# Patient Record
Sex: FEMALE | Race: BLACK OR AFRICAN AMERICAN | NOT HISPANIC OR LATINO | Employment: OTHER | URBAN - METROPOLITAN AREA
[De-identification: names, ages, dates, MRNs, and addresses within clinical notes are randomized per-mention and may not be internally consistent; named-entity substitution may affect disease eponyms.]

---

## 2020-11-27 ENCOUNTER — HOSPITAL ENCOUNTER (EMERGENCY)
Facility: HOSPITAL | Age: 60
Discharge: HOME | End: 2020-11-27
Attending: EMERGENCY MEDICINE
Payer: COMMERCIAL

## 2020-11-27 ENCOUNTER — APPOINTMENT (EMERGENCY)
Dept: RADIOLOGY | Facility: HOSPITAL | Age: 60
End: 2020-11-27
Attending: EMERGENCY MEDICINE
Payer: COMMERCIAL

## 2020-11-27 VITALS
BODY MASS INDEX: 22.5 KG/M2 | SYSTOLIC BLOOD PRESSURE: 143 MMHG | HEART RATE: 89 BPM | TEMPERATURE: 98.1 F | OXYGEN SATURATION: 98 % | RESPIRATION RATE: 18 BRPM | HEIGHT: 66 IN | DIASTOLIC BLOOD PRESSURE: 65 MMHG | WEIGHT: 140 LBS

## 2020-11-27 DIAGNOSIS — A04.72 CLOSTRIDIUM DIFFICILE COLITIS: ICD-10-CM

## 2020-11-27 DIAGNOSIS — R19.7 DIARRHEA, UNSPECIFIED TYPE: Primary | ICD-10-CM

## 2020-11-27 DIAGNOSIS — R10.9 ABDOMINAL PAIN, UNSPECIFIED ABDOMINAL LOCATION: ICD-10-CM

## 2020-11-27 LAB
ALBUMIN SERPL-MCNC: 3.3 G/DL (ref 3.4–5)
ALP SERPL-CCNC: 85 IU/L (ref 35–126)
ALT SERPL-CCNC: 8 IU/L (ref 11–54)
ANION GAP SERPL CALC-SCNC: 10 MEQ/L (ref 3–15)
AST SERPL-CCNC: 15 IU/L (ref 15–41)
BACTERIA #/AREA URNS HPF: ABNORMAL /HPF
BASOPHILS # BLD: 0.07 K/UL (ref 0.01–0.1)
BASOPHILS NFR BLD: 0.7 %
BILIRUB SERPL-MCNC: 0.5 MG/DL (ref 0.3–1.2)
BILIRUB UR QL STRIP.AUTO: NEGATIVE MG/DL
BUN SERPL-MCNC: 10 MG/DL (ref 8–20)
C DIFF STL QL: POSITIVE
CALCIUM SERPL-MCNC: 9.6 MG/DL (ref 8.9–10.3)
CHLORIDE SERPL-SCNC: 106 MEQ/L (ref 98–109)
CLARITY UR REFRACT.AUTO: CLEAR
CO2 SERPL-SCNC: 24 MEQ/L (ref 22–32)
COLOR UR AUTO: YELLOW
CREAT SERPL-MCNC: 1.1 MG/DL (ref 0.6–1.1)
DIFFERENTIAL METHOD BLD: ABNORMAL
EOSINOPHIL # BLD: 0.12 K/UL (ref 0.04–0.36)
EOSINOPHIL NFR BLD: 1.2 %
ERYTHROCYTE [DISTWIDTH] IN BLOOD BY AUTOMATED COUNT: 15 % (ref 11.7–14.4)
GFR SERPL CREATININE-BSD FRML MDRD: >60 ML/MIN/1.73M*2
GLUCOSE SERPL-MCNC: 85 MG/DL (ref 70–99)
GLUCOSE UR STRIP.AUTO-MCNC: NEGATIVE MG/DL
HCT VFR BLDCO AUTO: 39 % (ref 35–45)
HGB BLD-MCNC: 12.1 G/DL (ref 11.8–15.7)
HGB UR QL STRIP.AUTO: NEGATIVE
HYALINE CASTS #/AREA URNS LPF: ABNORMAL /LPF
IMM GRANULOCYTES # BLD AUTO: 0.03 K/UL (ref 0–0.08)
IMM GRANULOCYTES NFR BLD AUTO: 0.3 %
KETONES UR STRIP.AUTO-MCNC: 1 MG/DL
LACTATE SERPL-SCNC: 1.3 MMOL/L (ref 0.4–2)
LEUKOCYTE ESTERASE UR QL STRIP.AUTO: 1
LIPASE SERPL-CCNC: 28 U/L (ref 20–51)
LYMPHOCYTES # BLD: 2.02 K/UL (ref 1.2–3.5)
LYMPHOCYTES NFR BLD: 20.4 %
MCH RBC QN AUTO: 27.9 PG (ref 28–33.2)
MCHC RBC AUTO-ENTMCNC: 31 G/DL (ref 32.2–35.5)
MCV RBC AUTO: 90.1 FL (ref 83–98)
MONOCYTES # BLD: 0.65 K/UL (ref 0.28–0.8)
MONOCYTES NFR BLD: 6.6 %
NEUTROPHILS # BLD: 7.02 K/UL (ref 1.7–7)
NEUTS SEG NFR BLD: 70.8 %
NITRITE UR QL STRIP.AUTO: NEGATIVE
NRBC BLD-RTO: 0 %
PDW BLD AUTO: 11.8 FL (ref 9.4–12.3)
PH UR STRIP.AUTO: 6 [PH]
PLATELET # BLD AUTO: 282 K/UL (ref 150–369)
POTASSIUM SERPL-SCNC: 3.5 MEQ/L (ref 3.6–5.1)
PROT SERPL-MCNC: 8.3 G/DL (ref 6–8.2)
PROT UR QL STRIP.AUTO: 2
RBC # BLD AUTO: 4.33 M/UL (ref 3.93–5.22)
RBC #/AREA URNS HPF: ABNORMAL /HPF
SODIUM SERPL-SCNC: 140 MEQ/L (ref 136–144)
SP GR UR REFRACT.AUTO: 1.01
SQUAMOUS #/AREA URNS HPF: 1 /HPF
UROBILINOGEN UR STRIP-ACNC: 0.2 EU/DL
WBC # BLD AUTO: 9.91 K/UL (ref 3.8–10.5)
WBC #/AREA URNS HPF: ABNORMAL /HPF

## 2020-11-27 PROCEDURE — 96360 HYDRATION IV INFUSION INIT: CPT

## 2020-11-27 PROCEDURE — 63700000 HC SELF-ADMINISTRABLE DRUG

## 2020-11-27 PROCEDURE — 3E0337Z INTRODUCTION OF ELECTROLYTIC AND WATER BALANCE SUBSTANCE INTO PERIPHERAL VEIN, PERCUTANEOUS APPROACH: ICD-10-PCS | Performed by: EMERGENCY MEDICINE

## 2020-11-27 PROCEDURE — 87324 CLOSTRIDIUM AG IA: CPT | Performed by: PHYSICIAN ASSISTANT

## 2020-11-27 PROCEDURE — 25800000 HC PHARMACY IV SOLUTIONS: Performed by: PHYSICIAN ASSISTANT

## 2020-11-27 PROCEDURE — 83605 ASSAY OF LACTIC ACID: CPT | Performed by: PHYSICIAN ASSISTANT

## 2020-11-27 PROCEDURE — 81003 URINALYSIS AUTO W/O SCOPE: CPT | Performed by: EMERGENCY MEDICINE

## 2020-11-27 PROCEDURE — 87046 STOOL CULTR AEROBIC BACT EA: CPT | Performed by: PHYSICIAN ASSISTANT

## 2020-11-27 PROCEDURE — 85025 COMPLETE CBC W/AUTO DIFF WBC: CPT | Performed by: EMERGENCY MEDICINE

## 2020-11-27 PROCEDURE — 36415 COLL VENOUS BLD VENIPUNCTURE: CPT | Performed by: EMERGENCY MEDICINE

## 2020-11-27 PROCEDURE — G1004 CDSM NDSC: HCPCS

## 2020-11-27 PROCEDURE — 87086 URINE CULTURE/COLONY COUNT: CPT | Performed by: EMERGENCY MEDICINE

## 2020-11-27 PROCEDURE — 80053 COMPREHEN METABOLIC PANEL: CPT | Performed by: EMERGENCY MEDICINE

## 2020-11-27 PROCEDURE — 99284 EMERGENCY DEPT VISIT MOD MDM: CPT | Mod: 25

## 2020-11-27 PROCEDURE — 83690 ASSAY OF LIPASE: CPT | Performed by: PHYSICIAN ASSISTANT

## 2020-11-27 RX ORDER — ACETAMINOPHEN 325 MG/1
TABLET ORAL
Status: COMPLETED
Start: 2020-11-27 | End: 2020-11-27

## 2020-11-27 RX ORDER — CHOLECALCIFEROL (VITAMIN D3) 25 MCG
1000 TABLET ORAL DAILY
COMMUNITY
End: 2024-11-30 | Stop reason: HOSPADM

## 2020-11-27 RX ORDER — PANTOPRAZOLE SODIUM 40 MG/1
40 TABLET, DELAYED RELEASE ORAL DAILY
Status: ON HOLD | COMMUNITY
Start: 2020-07-06 | End: 2023-04-14

## 2020-11-27 RX ORDER — AMLODIPINE BESYLATE 5 MG/1
10 TABLET ORAL DAILY
Status: ON HOLD | COMMUNITY
Start: 2020-09-22 | End: 2023-04-14 | Stop reason: SDUPTHER

## 2020-11-27 RX ORDER — ACETAMINOPHEN 325 MG/1
650 TABLET ORAL ONCE
Status: COMPLETED | OUTPATIENT
Start: 2020-11-27 | End: 2020-11-27

## 2020-11-27 RX ORDER — BIOTIN 1 MG
1000 TABLET ORAL DAILY
COMMUNITY
End: 2024-11-30 | Stop reason: HOSPADM

## 2020-11-27 RX ORDER — CHOLECALCIFEROL (VITAMIN D3) 25 MCG
1000 TABLET,CHEWABLE ORAL DAILY
COMMUNITY
Start: 2020-02-14 | End: 2024-11-30 | Stop reason: HOSPADM

## 2020-11-27 RX ORDER — DOCUSATE SODIUM 100 MG/1
100 CAPSULE, LIQUID FILLED ORAL DAILY PRN
COMMUNITY
End: 2024-11-30 | Stop reason: HOSPADM

## 2020-11-27 RX ORDER — MORPHINE SULFATE 2 MG/ML
4 INJECTION, SOLUTION INTRAMUSCULAR; INTRAVENOUS ONCE
Status: DISCONTINUED | OUTPATIENT
Start: 2020-11-27 | End: 2020-11-27 | Stop reason: HOSPADM

## 2020-11-27 RX ORDER — METRONIDAZOLE 500 MG/1
500 TABLET ORAL 2 TIMES DAILY
Qty: 14 TABLET | Refills: 0 | Status: SHIPPED | OUTPATIENT
Start: 2020-11-27 | End: 2020-12-04

## 2020-11-27 RX ORDER — LEVOTHYROXINE SODIUM 100 UG/1
100 TABLET ORAL DAILY
COMMUNITY
Start: 2020-07-06

## 2020-11-27 RX ADMIN — ACETAMINOPHEN 650 MG: 325 TABLET ORAL at 15:10

## 2020-11-27 RX ADMIN — SODIUM CHLORIDE 1000 ML: 900 INJECTION, SOLUTION INTRAVENOUS at 12:30

## 2020-11-27 SDOH — HEALTH STABILITY: MENTAL HEALTH: HOW OFTEN DO YOU HAVE A DRINK CONTAINING ALCOHOL?: NEVER

## 2020-11-27 ASSESSMENT — ENCOUNTER SYMPTOMS
SPEECH DIFFICULTY: 0
FEVER: 0
SEIZURES: 0
DIAPHORESIS: 0
COLOR CHANGE: 0
COUGH: 0
ACTIVITY CHANGE: 0
FACIAL SWELLING: 0
NAUSEA: 0
HEADACHES: 0
DIFFICULTY URINATING: 0
DIARRHEA: 1
SORE THROAT: 0
VOMITING: 0
ABDOMINAL PAIN: 1
NECK PAIN: 0
WHEEZING: 0
AGITATION: 0
HEMATURIA: 0
BACK PAIN: 0
WEAKNESS: 0
SHORTNESS OF BREATH: 0

## 2020-11-27 NOTE — ED PROVIDER NOTES
HPI     Chief Complaint   Patient presents with   • Diarrhea       This is a 60-year-old  woman with a history of hypertension and scleroderma and C. difficile colitis presenting to the emergency department with 4 days of gradually worsening left lower quadrant abdominal pain and copious amounts of watery diarrhea.  Symptoms were gradual in onset and have been progressively worsening.  She describes the pain is a intermittent cramp that waxes and wanes in intensity without any obvious modifying factors.  She has been comfortably tolerating oral food with a normal appetite.  She denies any fevers or chills.  She has not seen any blood in her diarrhea.  She has no other complaints or concerns today.  She denies any recent travel or recent antibiotic use is, but notes that the symptoms seem to be similar to when she was diagnosed with C. difficile in the past.           Patient History     Past Medical History:   Diagnosis Date   • Hypertension    • Scleroderma (CMS/HCC)        Past Surgical History:   Procedure Laterality Date   • HERNIA REPAIR         History reviewed. No pertinent family history.    Social History     Tobacco Use   • Smoking status: Former Smoker   • Smokeless tobacco: Never Used   Substance Use Topics   • Alcohol use: Never     Frequency: Never   • Drug use: Never       Systems Reviewed from Nursing Triage:          Review of Systems     Review of Systems   Constitutional: Negative for activity change, diaphoresis and fever.   HENT: Negative for facial swelling and sore throat.    Eyes: Negative for visual disturbance.   Respiratory: Negative for cough, shortness of breath and wheezing.    Cardiovascular: Negative for chest pain and leg swelling.   Gastrointestinal: Positive for abdominal pain and diarrhea. Negative for nausea and vomiting.   Genitourinary: Negative for difficulty urinating and hematuria.   Musculoskeletal: Negative for back pain and neck pain.   Skin: Negative for color change and  pallor.   Neurological: Negative for seizures, syncope, speech difficulty, weakness and headaches.   Psychiatric/Behavioral: Negative for agitation and behavioral problems.   All other systems reviewed and are negative.       Physical Exam     ED Vitals    Date/Time Temp Pulse Resp BP SpO2 [facility]   11/27/20 1600 -- 89 18 143/65 98 % AM   11/27/20 1500 -- 90 22 146/67 100 % AM   11/27/20 1345 -- 93 17 152/65 98 % AM   11/27/20 1245 -- 88 17 142/72 98 % AM   11/27/20 1105 36.7 °C (98.1 °F) 113 20 194/84 100 % JLR          Pulse Ox %: 98 % (11/27/20 1630)  Pulse Ox Interpretation: Normal (11/27/20 1630)  Heart Rate: 80 (11/27/20 1630)  Rhythm Strip Interpretation: Normal Sinus Rhythm (11/27/20 1630)                                       Physical Exam  Vitals signs and nursing note reviewed.   Constitutional:       Appearance: She is well-developed.   HENT:      Head: Normocephalic and atraumatic.   Eyes:      Conjunctiva/sclera: Conjunctivae normal.   Neck:      Musculoskeletal: Normal range of motion.   Cardiovascular:      Rate and Rhythm: Normal rate and regular rhythm.   Pulmonary:      Effort: Pulmonary effort is normal.      Breath sounds: Normal breath sounds.   Abdominal:      General: There is no distension.      Palpations: Abdomen is soft. There is no mass.      Tenderness: There is abdominal tenderness. There is guarding.      Hernia: No hernia is present.   Musculoskeletal: Normal range of motion.         General: No tenderness or deformity.   Skin:     General: Skin is warm and dry.   Neurological:      General: No focal deficit present.      Mental Status: She is alert. Mental status is at baseline.   Psychiatric:         Behavior: Behavior normal.              Procedures    Results     Procedure Component Value Units Date/Time    Clostridium difficile tox screen Stool [[id number]]  (Abnormal) Collected: 11/27/20 1615    Specimen: Stool Updated: 11/27/20 1838     C difficile Toxin Screen Positive    Stool  culture Stool [[id number]] Collected: 11/27/20 1615    Specimen: Stool Updated: 11/27/20 1615    Urinalysis with Reflex Culture [[id number]]  (Abnormal) Collected: 11/27/20 1444    Specimen: Urine, Clean Catch Updated: 11/27/20 1549    Narrative:      The following orders were created for panel order Urinalysis with Reflex Culture.  Procedure                               Abnormality         Status                     ---------                               -----------         ------                     UA Reflex to Culture (Ma...[[id number]]  Abnormal            Final result               UA Microscopic[[id number]]               Abnormal            Final result                 Please view results for these tests on the individual orders.    UA Microscopic [[id number]]  (Abnormal) Collected: 11/27/20 1444    Specimen: Urine, Clean Catch Updated: 11/27/20 1549     RBC, Urine 0 TO 4 /HPF      WBC, Urine 10 TO 20 /HPF      Squamous Epithelial +1 /hpf      Hyaline Casts 0 TO 2 /lpf      Bacteria, Urine None Seen /HPF     UA Reflex to Culture (Macroscopic) [[id number]]  (Abnormal) Collected: 11/27/20 1444    Specimen: Urine, Clean Catch Updated: 11/27/20 1525     Color, Urine Yellow     Clarity, Urine Clear     Specific Gravity, Urine 1.012     pH, Urine 6.0     Leukocyte Esterase +1     Nitrite, Urine Negative     Protein, Urine +2     Glucose, Urine Negative mg/dL      Ketones, Urine +1 mg/dL      Comment: Free sulfhydryl drugs such as Mesna, Capoten, and Acetylcysteine (Mucomyst) may cause false positive ketonuria.        Urobilinogen, Urine 0.2 EU/dL      Bilirubin, Urine Negative mg/dL      Blood, Urine Negative     Comment: The sensitivity of the occult blood test is equivalent to approximately 4 intact RBC/HPF.       Lactate, w/ reflex repeat if > 2.0 [[id number]]  (Normal) Collected: 11/27/20 1443    Specimen: Blood, Venous Updated: 11/27/20 1445     Lactate 1.3 mmol/L     Comprehensive metabolic panel [[id number]]   (Abnormal) Collected: 11/27/20 1344    Specimen: Blood, Venous Updated: 11/27/20 1418     Sodium 140 mEQ/L      Potassium 3.5 mEQ/L      Comment: Results obtained on plasma. Plasma Potassium values may be up to 0.4 mEQ/L less than serum values. The differences may be greater for patients with high platelet or white cell counts.        Chloride 106 mEQ/L      CO2 24 mEQ/L      BUN 10 mg/dL      Creatinine 1.1 mg/dL      Glucose 85 mg/dL      Calcium 9.6 mg/dL      AST (SGOT) 15 IU/L      ALT (SGPT) 8 IU/L      Alkaline Phosphatase 85 IU/L      Total Protein 8.3 g/dL      Comment: Test performed on plasma which typically contains approximately 0.4 g/dL more protein than serum.        Albumin 3.3 g/dL      Bilirubin, Total 0.5 mg/dL      eGFR >60.0 mL/min/1.73m*2      Anion Gap 10 mEQ/L     Lipase [[id number]]  (Normal) Collected: 11/27/20 1344    Specimen: Blood, Venous Updated: 11/27/20 1418     Lipase 28 U/L     CBC and differential [[id number]]  (Abnormal) Collected: 11/27/20 1344    Specimen: Blood, Venous Updated: 11/27/20 1356     WBC 9.91 K/uL      RBC 4.33 M/uL      Hemoglobin 12.1 g/dL      Hematocrit 39.0 %      MCV 90.1 fL      MCH 27.9 pg      MCHC 31.0 g/dL      RDW 15.0 %      Platelets 282 K/uL      MPV 11.8 fL      Differential Type Auto     nRBC 0.0 %      Immature Granulocytes 0.3 %      Neutrophils 70.8 %      Lymphocytes 20.4 %      Monocytes 6.6 %      Eosinophils 1.2 %      Basophils 0.7 %      Immature Granulocytes, Absolute 0.03 K/uL      Neutrophils, Absolute 7.02 K/uL      Lymphocytes, Absolute 2.02 K/uL      Monocytes, Absolute 0.65 K/uL      Eosinophils, Absolute 0.12 K/uL      Basophils, Absolute 0.07 K/uL           Results     Procedure Component Value Units Date/Time    CT ABDOMEN PELVIS WITHOUT IV CONTRAST [[id number]] Collected: 11/27/20 1423     Updated: 11/27/20 1440    Narrative:      CLINICAL HISTORY: Left lower quadrant pain.  Diverticulosis.  Diarrhea.  History  of C. Difficile  colitis.    COMMENT: CT of abdomen and pelvis without contrast.  Reformatted images in  sagittal and coronal images.  3 mm sections.    CT DOSE:  One or more dose reduction techniques (e.g. automated exposure  control, adjustment of the mA and/or kV according to patient size, use of  iterative reconstruction technique) utilized for this examination.    The  image shows a nonspecific bowel gas pattern.  The lung volumes are  good.  There is cardiomegaly.  The spleen is not enlarged.    The lung bases show chronic appearing interstitial changes.  The heart is  unremarkable.  There is a small hiatal hernia.  The stomach is moderate in size.  Duodenum is unremarkable.  Small bowel loops are normal in position and  caliber.    The pancreas is unremarkable.  The gallbladder is unremarkable.  The liver is  hyperdense compared to the spleen.  This suggests hemosiderosis.  The spleen is  homogeneous.  There is a calcification of the upper pole right kidney.  There is  no hydronephrosis.  There is a hyperdense lesion off the lower pole left kidney.  It measures 4.6 x 2.7 x 2.9 cm.    There is moderate calcification of the abdominal aorta.  There are no enlarged  retroperitoneal nodes.  There are nonspecific mesenteric nodes.  Urinary bladder  wall is mildly thickened.  This suggests chronic inflammation.  There are  scattered groin lymph nodes.    There is no significant free fluid in the pelvis.  Uterus is small.  It is  midline.  Adnexal regions are unremarkable.  The colon shows a prominent wall.  This may be due to previous inflammation.  There is no obstruction.  There is no  inflammation around the cecum.  There is colonic diverticulosis.      Impression:      IMPRESSION: Colonic diverticulosis.  Prominent wall of the colon may be due to  previous infectious /inflammatory process.  Oral contrast would provide  additional information.    Hyperdense lesion lower pole left kidney.  It measures up to 4.6 cm.   Recommend  ultrasound.    Wall thickening of urinary bladder suggests chronic inflammation.            No orders to display               ED Course & MDM     MDM         ED Course as of Nov 27 1936 Fri Nov 27, 2020   1315 PERIPHERAL IV PLACEMENT :   Using dynamic US guidance,  I placed a 18ga IV into left forearm on first attempt with no complications.  using standard aseptic technique. I drew labs from this line before securing with tegaderm.       [DB]   1838 Micro lab called with positive C.diff results.    [BF]   1838 C difficile Toxin Screen(!): Positive [BF]      ED Course User Index  [BF] Vira Myers PA C  [DB] Luis Martinez PA C         Clinical Impressions as of Nov 27 1936   Diarrhea, unspecified type   Abdominal pain, unspecified abdominal location   Clostridium difficile colitis        Luis Martinez PA C  11/27/20 1936

## 2020-11-27 NOTE — DISCHARGE INSTRUCTIONS
Call the ER and ask for DJ or any of the other Pas at 6pm tonight for the results of your C-diff test.  If the test is positive, we will call you in antibiotics to treat it. If the test is negative, we will call you in anti-diarrhea medications to help with your symptoms.  Return to the ER with any new or worsening problems.

## 2020-11-28 LAB
BACTERIA UR CULT: NORMAL
BACTERIA UR CULT: NORMAL

## 2020-11-28 NOTE — ED ATTESTATION NOTE
The patient was evaluated and managed by the physician assistant / nurse practitioner.       Brady Miller MD  11/28/20 1014

## 2020-11-30 LAB — BACTERIA STL CULT: NORMAL

## 2022-06-18 ENCOUNTER — APPOINTMENT (EMERGENCY)
Dept: RADIOLOGY | Facility: HOSPITAL | Age: 62
End: 2022-06-18
Attending: STUDENT IN AN ORGANIZED HEALTH CARE EDUCATION/TRAINING PROGRAM
Payer: COMMERCIAL

## 2022-06-18 ENCOUNTER — HOSPITAL ENCOUNTER (EMERGENCY)
Facility: HOSPITAL | Age: 62
Discharge: HOME | End: 2022-06-18
Attending: STUDENT IN AN ORGANIZED HEALTH CARE EDUCATION/TRAINING PROGRAM
Payer: COMMERCIAL

## 2022-06-18 VITALS
OXYGEN SATURATION: 96 % | RESPIRATION RATE: 18 BRPM | WEIGHT: 125 LBS | HEIGHT: 66 IN | BODY MASS INDEX: 20.09 KG/M2 | SYSTOLIC BLOOD PRESSURE: 164 MMHG | TEMPERATURE: 98.6 F | HEART RATE: 97 BPM | DIASTOLIC BLOOD PRESSURE: 75 MMHG

## 2022-06-18 DIAGNOSIS — L98.499 SKIN ULCER, UNSPECIFIED ULCER STAGE (CMS/HCC): Primary | ICD-10-CM

## 2022-06-18 PROCEDURE — 99283 EMERGENCY DEPT VISIT LOW MDM: CPT | Mod: 25

## 2022-06-18 PROCEDURE — 63700000 HC SELF-ADMINISTRABLE DRUG: Performed by: STUDENT IN AN ORGANIZED HEALTH CARE EDUCATION/TRAINING PROGRAM

## 2022-06-18 PROCEDURE — 73610 X-RAY EXAM OF ANKLE: CPT | Mod: RT

## 2022-06-18 RX ORDER — MUPIROCIN 20 MG/G
1 OINTMENT TOPICAL 3 TIMES DAILY
Qty: 22 G | Refills: 0 | Status: SHIPPED | OUTPATIENT
Start: 2022-06-18 | End: 2022-06-25

## 2022-06-18 RX ORDER — MUPIROCIN 20 MG/G
1 OINTMENT TOPICAL ONCE
Status: COMPLETED | OUTPATIENT
Start: 2022-06-18 | End: 2022-06-18

## 2022-06-18 RX ORDER — MUPIROCIN 20 MG/G
1 OINTMENT TOPICAL 3 TIMES DAILY
Status: DISCONTINUED | OUTPATIENT
Start: 2022-06-18 | End: 2022-06-18

## 2022-06-18 RX ORDER — HEATING PAD
1 EACH MISCELLANEOUS DAILY
Qty: 210 ML | Refills: 0 | Status: ON HOLD | OUTPATIENT
Start: 2022-06-18 | End: 2023-04-14 | Stop reason: ALTCHOICE

## 2022-06-18 RX ADMIN — MUPIROCIN 1 APPLICATION.: 20 OINTMENT TOPICAL at 22:40

## 2022-06-18 RX ADMIN — COLLAGENASE SANTYL 1 APPLICATION.: 250 OINTMENT TOPICAL at 22:40

## 2022-06-19 ASSESSMENT — ENCOUNTER SYMPTOMS
FLANK PAIN: 0
ABDOMINAL PAIN: 0
SORE THROAT: 0
COLOR CHANGE: 0
DIARRHEA: 0
CONFUSION: 0
BACK PAIN: 0
VOMITING: 0
HEADACHES: 0
WOUND: 1
SHORTNESS OF BREATH: 0
FEVER: 0
EYE PAIN: 0

## 2022-06-19 NOTE — ED PROVIDER NOTES
Emergency Medicine Note  HPI   HISTORY OF PRESENT ILLNESS     Patient is a 60 yo female with a history of HTN, SLE, and scleroderma here for ulcers to bilateral ankles and hands. Due to her underlying conditions she frequently has ulcerations on multiple extremities however came to ED tonight when R medial ankle ulcer started weeping fluid. No fevers and is otherwise feeling well. Reports she is soaking her ulcers in peroxide and dial soap daily.            Patient History   PAST HISTORY     Reviewed from Nursing Triage:         Past Medical History:   Diagnosis Date   • Hypertension    • Lupus (CMS/HCC)    • Scleroderma (CMS/HCC)        Past Surgical History:   Procedure Laterality Date   • HERNIA REPAIR         History reviewed. No pertinent family history.    Social History     Tobacco Use   • Smoking status: Former Smoker   • Smokeless tobacco: Never Used   Substance Use Topics   • Alcohol use: Never   • Drug use: Never         Review of Systems   REVIEW OF SYSTEMS     Review of Systems   Constitutional: Negative for fever.   HENT: Negative for sore throat.    Eyes: Negative for pain.   Respiratory: Negative for shortness of breath.    Cardiovascular: Negative for chest pain.   Gastrointestinal: Negative for abdominal pain, diarrhea and vomiting.   Genitourinary: Negative for flank pain.   Musculoskeletal: Negative for back pain.   Skin: Positive for wound. Negative for color change.   Neurological: Negative for headaches.   Psychiatric/Behavioral: Negative for confusion.         VITALS     ED Vitals    Date/Time Temp Pulse Resp BP SpO2 [facility]   06/18/22 2246 -- -- 18 164/75 96 % MD   06/18/22 2200 -- 97 -- -- 96 % MD   06/18/22 2145 -- 92 20 -- 97 % MD   06/18/22 2057 -- 98 18 176/86 99 % MD   06/18/22 1948 37 °C (98.6 °F) 132 16 162/89 99 % ESR                       Physical Exam   PHYSICAL EXAM     Physical Exam  Vitals and nursing note reviewed.   Constitutional:       General: She is not in acute distress.      Appearance: She is well-developed. She is not toxic-appearing.   HENT:      Head: Atraumatic.   Eyes:      Conjunctiva/sclera: Conjunctivae normal.   Pulmonary:      Effort: Pulmonary effort is normal.   Abdominal:      General: Abdomen is flat.   Musculoskeletal:         General: No deformity.      Cervical back: Normal range of motion.   Skin:     General: Skin is warm and dry.      Comments: Ulceration to medial R ankle 4cm with serosanguinous weeping and honey crusting. Ulcer to lateral L ankle 2cm with small amount of weeping as well. Small ulcers to bilateral toes and dorsal hands that are dry and superficial. No erythema or warmth whatsoever   Neurological:      General: No focal deficit present.      Mental Status: She is alert.   Psychiatric:         Behavior: Behavior normal.           PROCEDURES     Procedures     DATA     Results     None          Imaging Results          X-RAY ANKLE RIGHT 3+ VIEWS (Final result)  Result time 06/19/22 09:33:13   Procedure changed from X-RAY ANKLE RIGHT 2 VIEWS     Final result                 Impression:    IMPRESSION:  No fracture is seen in the right ankle.               Narrative:    CLINICAL HISTORY:    Open wound    COMMENT:    Four views of the right ankle show no evidence of fracture or  dislocation.  No degenerative or destruction changes are seen.  The ankle  mortise appears intact.  The visualized bones are osteopenic.  There is a small  plantar calcaneal spur.                    ED Interpretation    No acute fracture or dislocation.                                No orders to display       Scoring tools                                 ED Course & MDM   MDM / ED COURSE / CLINICAL IMPRESSIONS / DISPO     MDM  Number of Diagnoses or Management Options  Skin ulcer, unspecified ulcer stage (CMS/[facility])  Diagnosis management comments: Patient is a 62 yo female with a history of HTN, SLE, scleroderma here for skin ulcers. Superficial skin ulcerations are without signs  of cellulitis. XR of largest ulcer without obvious signs of osteomyelitis however honey crusting concerning for impetigo - therefore rx for mupirocin given in addition to santyl. Educated patient at length regarding wound care and to avoid hydrogen peroxide use. Dc'ed with plan to follow up with wound care.       Amount and/or Complexity of Data Reviewed  Independent visualization of images, tracings, or specimens: yes        ED Course as of 06/19/22 1949   Sat Jun 18, 2022   2223 Heart Rate(!): 132  Per patient, this was documented at triage after she walked in from the parking lot and was winded. Since being in the room in the ED she has had no tachycardia and no systemic symptoms. [KK]      ED Course User Index  [KK] Fiona Montoya MD         Clinical Impressions as of 06/19/22 1949   Skin ulcer, unspecified ulcer stage (CMS/HCC)     Discharge         Fiona Montoya MD  06/19/22 2004

## 2022-06-27 ENCOUNTER — APPOINTMENT (EMERGENCY)
Dept: RADIOLOGY | Facility: HOSPITAL | Age: 62
End: 2022-06-27
Payer: COMMERCIAL

## 2022-06-27 ENCOUNTER — HOSPITAL ENCOUNTER (EMERGENCY)
Facility: HOSPITAL | Age: 62
Discharge: HOME | End: 2022-06-27
Attending: EMERGENCY MEDICINE
Payer: COMMERCIAL

## 2022-06-27 VITALS
RESPIRATION RATE: 18 BRPM | TEMPERATURE: 99 F | HEIGHT: 66 IN | DIASTOLIC BLOOD PRESSURE: 69 MMHG | SYSTOLIC BLOOD PRESSURE: 145 MMHG | WEIGHT: 127 LBS | OXYGEN SATURATION: 96 % | BODY MASS INDEX: 20.41 KG/M2 | HEART RATE: 76 BPM

## 2022-06-27 DIAGNOSIS — R07.9 CHEST PAIN, UNSPECIFIED TYPE: Primary | ICD-10-CM

## 2022-06-27 DIAGNOSIS — L98.499 SKIN ULCER, UNSPECIFIED ULCER STAGE (CMS/HCC): ICD-10-CM

## 2022-06-27 DIAGNOSIS — M79.673 PAIN OF FOOT, UNSPECIFIED LATERALITY: ICD-10-CM

## 2022-06-27 LAB
ALBUMIN SERPL-MCNC: 2.8 G/DL (ref 3.4–5)
ALP SERPL-CCNC: 71 IU/L (ref 35–126)
ALT SERPL-CCNC: 13 IU/L (ref 11–54)
ANION GAP SERPL CALC-SCNC: 8 MEQ/L (ref 3–15)
AST SERPL-CCNC: 30 IU/L (ref 15–41)
ATRIAL RATE: 92
BASOPHILS # BLD: 0.08 K/UL (ref 0.01–0.1)
BASOPHILS NFR BLD: 1 %
BILIRUB SERPL-MCNC: 0.7 MG/DL (ref 0.3–1.2)
BNP SERPL-MCNC: 111 PG/ML
BUN SERPL-MCNC: 14 MG/DL (ref 8–20)
CALCIUM SERPL-MCNC: 9.1 MG/DL (ref 8.9–10.3)
CHLORIDE SERPL-SCNC: 107 MEQ/L (ref 98–109)
CO2 SERPL-SCNC: 23 MEQ/L (ref 22–32)
CREAT SERPL-MCNC: 1 MG/DL (ref 0.6–1.1)
D DIMER PPP IA.FEU-MCNC: 0.56 UG/ML FEU (ref 0–0.5)
DIFFERENTIAL METHOD BLD: ABNORMAL
EOSINOPHIL # BLD: 0.24 K/UL (ref 0.04–0.36)
EOSINOPHIL NFR BLD: 3 %
ERYTHROCYTE [DISTWIDTH] IN BLOOD BY AUTOMATED COUNT: 15.6 % (ref 11.7–14.4)
GFR SERPL CREATININE-BSD FRML MDRD: >60 ML/MIN/1.73M*2
GLUCOSE SERPL-MCNC: 86 MG/DL (ref 70–99)
HCT VFR BLDCO AUTO: 35.8 % (ref 35–45)
HGB BLD-MCNC: 11.2 G/DL (ref 11.8–15.7)
IMM GRANULOCYTES # BLD AUTO: 0.03 K/UL (ref 0–0.08)
IMM GRANULOCYTES NFR BLD AUTO: 0.4 %
LYMPHOCYTES # BLD: 2.06 K/UL (ref 1.2–3.5)
LYMPHOCYTES NFR BLD: 25.8 %
MCH RBC QN AUTO: 27.7 PG (ref 28–33.2)
MCHC RBC AUTO-ENTMCNC: 31.3 G/DL (ref 32.2–35.5)
MCV RBC AUTO: 88.4 FL (ref 83–98)
MONOCYTES # BLD: 0.6 K/UL (ref 0.28–0.8)
MONOCYTES NFR BLD: 7.5 %
NEUTROPHILS # BLD: 4.98 K/UL (ref 1.7–7)
NEUTS SEG NFR BLD: 62.3 %
NRBC BLD-RTO: 0 %
P AXIS: 65
PDW BLD AUTO: 10.9 FL (ref 9.4–12.3)
PLATELET # BLD AUTO: 243 K/UL (ref 150–369)
POTASSIUM SERPL-SCNC: 4.8 MEQ/L (ref 3.6–5.1)
PR INTERVAL: 130
PROT SERPL-MCNC: 8.3 G/DL (ref 6–8.2)
QRS DURATION: 80
QT INTERVAL: 358
QTC CALCULATION(BAZETT): 442
R AXIS: 53
RBC # BLD AUTO: 4.05 M/UL (ref 3.93–5.22)
SODIUM SERPL-SCNC: 138 MEQ/L (ref 136–144)
T WAVE AXIS: 92
TROPONIN I SERPL HS-MCNC: 12 PG/ML
TROPONIN I SERPL HS-MCNC: 16 PG/ML
VENTRICULAR RATE: 92
WBC # BLD AUTO: 7.99 K/UL (ref 3.8–10.5)

## 2022-06-27 PROCEDURE — 83880 ASSAY OF NATRIURETIC PEPTIDE: CPT | Performed by: EMERGENCY MEDICINE

## 2022-06-27 PROCEDURE — 84484 ASSAY OF TROPONIN QUANT: CPT

## 2022-06-27 PROCEDURE — 99283 EMERGENCY DEPT VISIT LOW MDM: CPT | Mod: 25

## 2022-06-27 PROCEDURE — 85025 COMPLETE CBC W/AUTO DIFF WBC: CPT

## 2022-06-27 PROCEDURE — 93010 ELECTROCARDIOGRAM REPORT: CPT | Performed by: INTERNAL MEDICINE

## 2022-06-27 PROCEDURE — 36415 COLL VENOUS BLD VENIPUNCTURE: CPT

## 2022-06-27 PROCEDURE — 80053 COMPREHEN METABOLIC PANEL: CPT | Performed by: EMERGENCY MEDICINE

## 2022-06-27 PROCEDURE — 80053 COMPREHEN METABOLIC PANEL: CPT

## 2022-06-27 PROCEDURE — 73620 X-RAY EXAM OF FOOT: CPT | Mod: 50

## 2022-06-27 PROCEDURE — 63700000 HC SELF-ADMINISTRABLE DRUG: Performed by: PHYSICIAN ASSISTANT

## 2022-06-27 PROCEDURE — 93005 ELECTROCARDIOGRAM TRACING: CPT | Performed by: EMERGENCY MEDICINE

## 2022-06-27 PROCEDURE — 71045 X-RAY EXAM CHEST 1 VIEW: CPT

## 2022-06-27 PROCEDURE — 84484 ASSAY OF TROPONIN QUANT: CPT | Mod: 91 | Performed by: EMERGENCY MEDICINE

## 2022-06-27 PROCEDURE — 84484 ASSAY OF TROPONIN QUANT: CPT | Performed by: EMERGENCY MEDICINE

## 2022-06-27 PROCEDURE — 85025 COMPLETE CBC W/AUTO DIFF WBC: CPT | Performed by: EMERGENCY MEDICINE

## 2022-06-27 PROCEDURE — 85379 FIBRIN DEGRADATION QUANT: CPT | Performed by: EMERGENCY MEDICINE

## 2022-06-27 PROCEDURE — 93005 ELECTROCARDIOGRAM TRACING: CPT

## 2022-06-27 RX ORDER — ACETAMINOPHEN AND CODEINE PHOSPHATE 300; 30 MG/1; MG/1
1-2 TABLET ORAL EVERY 6 HOURS PRN
Qty: 12 TABLET | Refills: 0 | Status: SHIPPED | OUTPATIENT
Start: 2022-06-27 | End: 2022-07-02

## 2022-06-27 RX ORDER — ACETAMINOPHEN 325 MG/1
975 TABLET ORAL ONCE
Status: COMPLETED | OUTPATIENT
Start: 2022-06-27 | End: 2022-06-27

## 2022-06-27 RX ADMIN — ACETAMINOPHEN 975 MG: 325 TABLET, FILM COATED ORAL at 17:01

## 2022-06-27 ASSESSMENT — ENCOUNTER SYMPTOMS
FEVER: 0
COUGH: 0
MYALGIAS: 0
COLOR CHANGE: 0
CHILLS: 0
ABDOMINAL PAIN: 0
NAUSEA: 0
HEADACHES: 0
WOUND: 1
BACK PAIN: 0
PALPITATIONS: 0
LIGHT-HEADEDNESS: 0
VOMITING: 0
DIZZINESS: 0
WEAKNESS: 0
SHORTNESS OF BREATH: 0

## 2022-06-27 NOTE — ED PROVIDER NOTES
Emergency Medicine Note  HPI   HISTORY OF PRESENT ILLNESS     HPI   Patient is a 60 yo female with a history of HTN, SLE, ILD, and scleroderma who presents to ED with chest pain since last night.  She describes it as a tightness on the left side of her chest that is worse when walking especially going up stairs.  She also says it hurts when she presses on the area.  In addition,  she feels more short of breath than normal in general. She also has painful ulcers on both feet and ankles with new ulcers having appeared since she was last evaluated in the ED on 6/18. The pain is causing her difficulty walking.  At that time she was discharged home on Keflex and instructed to follow-up with wound care for which she has an appointment on 7/11.  Patient is also reporting feeling painful 'knots' and ulcers on her scalp but states they are not itchy.  She denies any fever/chills, nausea or vomiting, abdominal pain, cough, syncope, lightheadedness.          Patient History   PAST HISTORY     Reviewed from Nursing Triage:         Past Medical History:   Diagnosis Date   • Hypertension    • Lupus (CMS/HCC)    • Scleroderma (CMS/HCC)        Past Surgical History:   Procedure Laterality Date   • HERNIA REPAIR         No family history on file.    Social History     Tobacco Use   • Smoking status: Former Smoker   • Smokeless tobacco: Never Used   Substance Use Topics   • Alcohol use: Never   • Drug use: Never         Review of Systems   REVIEW OF SYSTEMS     Review of Systems   Constitutional: Negative for chills and fever.   Respiratory: Negative for cough and shortness of breath.    Cardiovascular: Positive for chest pain. Negative for palpitations and leg swelling.   Gastrointestinal: Negative for abdominal pain, nausea and vomiting.   Musculoskeletal: Positive for gait problem. Negative for back pain and myalgias.   Skin: Positive for wound. Negative for color change and rash.   Neurological: Negative for dizziness, syncope,  weakness, light-headedness and headaches.   All other systems reviewed and are negative.        VITALS     ED Vitals    Date/Time Temp Pulse Resp BP SpO2 [facility]   06/27/22 1900 -- 76 18 145/69 96 % DA   06/27/22 1800 -- 81 20 146/74 96 % DA   06/27/22 1700 -- 81 20 160/77 99 % DA   06/27/22 1601 -- 81 -- -- -- DA   06/27/22 1558 -- 83 20 179/80 100 % DA   06/27/22 1413 37.2 °C (99 °F) 93 18 158/72 100 % CRISTINA                       Physical Exam   PHYSICAL EXAM     Physical Exam  Vitals and nursing note reviewed.   Constitutional:       General: She is not in acute distress.     Appearance: She is well-developed. She is not toxic-appearing or diaphoretic.      Comments: Pt is chronically ill appearing   HENT:      Head: Normocephalic.      Comments: Left parietal scalp there is an ulcerated lesion without erythema, edema or discharge.   Eyes:      Conjunctiva/sclera: Conjunctivae normal.   Neck:      Vascular: No JVD.   Cardiovascular:      Rate and Rhythm: Normal rate and regular rhythm.      Heart sounds: Normal heart sounds.   Pulmonary:      Effort: Pulmonary effort is normal. No tachypnea, accessory muscle usage or respiratory distress.      Breath sounds: Normal breath sounds. No decreased breath sounds, wheezing, rhonchi or rales.   Chest:      Chest wall: Tenderness present.      Comments: Left sided chest wall point tenderness with palpation  Abdominal:      Palpations: Abdomen is soft.      Tenderness: There is no abdominal tenderness.   Musculoskeletal:      Cervical back: Normal range of motion and neck supple.      Right lower leg: No tenderness. No edema.      Left lower leg: No tenderness. No edema.   Skin:     General: Skin is warm and dry.      Comments: Multiple superficial ulcers on both ankles/feet and inter-web spaces, honey crusted leakage present. Multiple deformities on digits UE/LE B/L (chronic)/   Neurological:      Mental Status: She is alert and oriented to person, place, and time.    Psychiatric:         Behavior: Behavior normal.      Comments: dysphoric           PROCEDURES     Procedures     DATA     Results     Procedure Component Value Units Date/Time    [place] Draw Panel [[id number]] Collected: 06/27/22 1557    Specimen: Blood, Venous Updated: 06/28/22 0000    Narrative:      The following orders were created for panel order [place] Draw Panel.  Procedure                               Abnormality         Status                     ---------                               -----------         ------                     RAINBOW LT BLUE[[id number]]                                  Final result                 Please view results for these tests on the individual orders.    RAINBOW LT BLUE [[id number]] Collected: 06/27/22 1557    Specimen: Blood, Venous Updated: 06/28/22 0000    B-type natriuretic peptide [[id number]]  (Abnormal) Collected: 06/27/22 1557    Specimen: Blood, Venous Updated: 06/27/22 1825      pg/mL     D-dimer, quantitative [[id number]]  (Abnormal) Collected: 06/27/22 1557    Specimen: Blood, Venous Updated: 06/27/22 1819     D-Dimer, Quant 0.56 ug/mL FEU      Comment: Suggested Age Related Reference Range: 0.00 - 0.61  The D-Dimer assay can be used as an aid in the diagnosis of DVT or PE. The test can not be used by itself to exclude DVT or PE. When used as a diagnostic aid, the cutoff value is the same as the reference range: <0.5 ug/ml FEU.       HS Troponin I (with 2 hour reflex) [[id number]]  (Normal) Collected: 06/27/22 1557    Specimen: Blood, Venous Updated: 06/27/22 1640     High Sens Troponin I 12 pg/mL     Comprehensive metabolic panel [[id number]]  (Abnormal) Collected: 06/27/22 1557    Specimen: Blood, Venous Updated: 06/27/22 1636     Sodium 138 mEQ/L      Potassium 4.8 mEQ/L      Comment: Results obtained on plasma. Plasma Potassium values may be up to 0.4 mEQ/L less than serum values. The differences may be greater for patients with high platelet or white cell  counts.  MODERATE HEMOLYSIS, RESULT MAY BE INCREASED.        Chloride 107 mEQ/L      CO2 23 mEQ/L      BUN 14 mg/dL      Creatinine 1.0 mg/dL      Glucose 86 mg/dL      Calcium 9.1 mg/dL      AST (SGOT) 30 IU/L      Comment: MODERATE HEMOLYSIS, RESULT MAY BE INCREASED.        ALT (SGPT) 13 IU/L      Comment: MODERATE HEMOLYSIS, RESULT MAY BE INCREASED.        Alkaline Phosphatase 71 IU/L      Total Protein 8.3 g/dL      Comment: Test performed on plasma which typically contains approximately 0.4 g/dL more protein than serum.        Albumin 2.8 g/dL      Bilirubin, Total 0.7 mg/dL      Comment: MODERATE HEMOLYSIS, RESULT MAY BE INCREASED.        eGFR >60.0 mL/min/1.73m*2      Anion Gap 8 mEQ/L     CBC and differential [[id number]]  (Abnormal) Collected: 06/27/22 155    Specimen: Blood, Venous Updated: 06/27/22 1607     WBC 7.99 K/uL      RBC 4.05 M/uL      Hemoglobin 11.2 g/dL      Hematocrit 35.8 %      MCV 88.4 fL      MCH 27.7 pg      MCHC 31.3 g/dL      RDW 15.6 %      Platelets 243 K/uL      MPV 10.9 fL      Differential Type Auto     nRBC 0.0 %      Immature Granulocytes 0.4 %      Neutrophils 62.3 %      Lymphocytes 25.8 %      Monocytes 7.5 %      Eosinophils 3.0 %      Basophils 1.0 %      Immature Granulocytes, Absolute 0.03 K/uL      Neutrophils, Absolute 4.98 K/uL      Lymphocytes, Absolute 2.06 K/uL      Monocytes, Absolute 0.60 K/uL      Eosinophils, Absolute 0.24 K/uL      Basophils, Absolute 0.08 K/uL           Imaging Results          X-RAY FOOT BILATERAL 2 VW (Final result)  Result time 06/27/22 17:38:38    Final result                 Impression:    IMPRESSION:  bones are demineralized    degenerative changes seen about the first MTP joints bilaterally.    No periosteal reaction or cortical destruction is noted. No large soft tissue  defects are apparent.                 Narrative:      CLINICAL HISTORY: B/L ulcers  COMMENT:  2 view examination of the right foot and 2 view examination of the left  foot  shows no fracture or dislocation of bone structures. Bones are demineralized.  Degenerative changes seen about the first MTP joints bilaterally. No periosteal  reaction or cortical destruction is noted. No large soft tissue defects are  apparent. Vascular calcification present. Mild hindfoot degenerative changes are  noted bilaterally right greater than left                               X-RAY CHEST 1 VIEW (Final result)  Result time 06/27/22 16:52:03    Final result                 Impression:    IMPRESSION:  Cardiomegaly and diffuse interstitial prominence.             Narrative:      CLINICAL HISTORY: Chest pain, shortness of breath.    COMMENT:  A portable upright AP view of the chest was performed.    Comparison: 7/8/2009    Cardiac monitoring leads obscure portions of the underlying lungs. There are  diffusely prominent interstitial markings. No definite focal consolidation or  large pleural effusion is appreciated. The cardiac silhouette is enlarged.  Aortic atherosclerosis is present. Degenerative changes are noted in the  regional osseous structures.                                ECG 12 lead   Final Result          Scoring tools                                 ED Course & MDM   MDM / ED COURSE / CLINICAL IMPRESSIONS / DISPO     MDM  Patient is a 60 yo female with a history of HTN, SLE, ILD, scleroderma who presents to ED with L sided chest pain worse with exertion and worsening SOB, also c/o pain from ulcers on both feet (wound care appt in place and on keflex), pain causing trouble ambulating. Also has new lesion on left scalp. She is not currently in acute distress but appears chronically ill. She has low grade temp but otherwise stable VS. Considered ACS, PE, pna, pulm edema, wound infection, acute exac of chronic illnesses.   Cardiac work-up in process. EKG nsr without ischemia. Rpt trop in process to r/o ACS.   X-rays of feet to r/o OM. Since she cannot tolerate NSAIDs, given tylenol for foot  pain, pending reassessment.       ED Course as of 06/29/22 0820   Mon Jun 27, 2022   1655 CXR: 'Cardiomegaly and diffuse interstitial prominence' - pt has known ILD, not hypoxic, not in resp distress [JL]   1656 Pt has appt with wound care on 7/11 and currently on keflex [JL]   1712 Pending repeat troponin, B/L foot x-rays and reassessment for gait and pain control. Pending ED attending cece, signed out to nightshift.  [JL]   1800 Sign out with repeat trop pending.   Dr. Prasad evaluated patient personally and added on a ddimer.   Will reassess. [MO]   1827 Ddimer reviewed given age adjusted and low suspicion no indication for CT chest at this time. Second trop pending. [MO]   1834 Repeat trop only 16 with delta of 4. Okay for discharge home with follow up to pcp, rheum and cards [MO]   1908 Plain films show no evidence of osteo. Chronic issue - recommending follow up with wound care and rheum. Will dress wounds prior to dc. Instructed to complete abx course as directed.     Recommending follow up with cards as well.     Case d.w Dr. Prasad, he evaluated patient personally and agrees with plan and dispo [MO]   1918 Patient asking for pain control. Tylenol not helping as much with foot ulcer. Discussed taking T#3 and she would like to try it. We discussed risks and concerns. She expressed understanding . [MO]      ED Course User Index  [JL] Kina Keating PA C  [MO] Mona Suazo PA C         Clinical Impressions as of 06/29/22 0820   Chest pain, unspecified type   Pain of foot, unspecified laterality   Skin ulcer, unspecified ulcer stage (CMS/HCC)              Kina Keating PA C  06/27/22 1743       Kina Keating PA C  06/29/22 0820

## 2022-06-27 NOTE — ED ATTESTATION NOTE
I have personally seen and examined the patient.  I reviewed the note above and agree with the findings and plan of care, with an addendum documented below.    The patient complains of left-sided chest discomfort.  It is worse with palpation and with inspiration.  She had a negative age-adjusted D-dimer here.  Her chest x-ray was unchanged from baseline.  She also reported some ulcers related to her scleroderma.  She was advised to follow-up with her primary doctor and cardiology and return to the ER if worse in any way before then.    Naun (Ed) MD Osmar Freeman Henry (Ed) MD Ramirez  06/27/22 1185

## 2022-06-27 NOTE — DISCHARGE INSTRUCTIONS
Please follow up as directed to obtain your results and review your plan of care. CALL your primary doctor's office today to schedule a follow up appointment within the next 48 hours.     REVIEW AND DISCUSS ALL OF YOUR MEDICATIONS WITH YOUR PRIMARY CARE TEAM WITHIN THE NEXT 2 DAYS, even ones that you may have been on for a long time.    Radiology review of your studies (XRAYs, CT Scans, Ultrasounds, MRI scans) may still be pending. Preliminary results may be available today but final written reports may not be available until tomorrow. Important findings may be included in the final radiology review.     Please CALL the Emergency Department at [phone] to obtain the final results within the next 24 hours. Review the final results of all of your tests with your primary care doctor within the next 2 days.     Cultures and uncommon labs may take 2 days or more before results are available. Please ask about all pending tests until the final results are known. You may not be notified of important test results unless you ask for these when you call or when you follow up.    Please call or visit your primary doctor or return to this Emergency Department (or the closest Emergency Department) if you have new concerns, if you are not getting better, or if  you feel that you are getting worse.    Thank you and good luck in your recovery!

## 2022-07-06 ENCOUNTER — TELEPHONE (OUTPATIENT)
Dept: SCHEDULING | Facility: CLINIC | Age: 62
End: 2022-07-06
Payer: COMMERCIAL

## 2022-07-06 NOTE — TELEPHONE ENCOUNTER
Called and Spoke with Patient // No sooner appointments avil at the [place] office at this time // Patient add to the providers wait list for any cancellations.

## 2022-07-06 NOTE — TELEPHONE ENCOUNTER
New Pt Hospital Follow Up 8/12/2022 would like sooner if possible    Name of patient: Arielle GUAJARDO Isidro    Relationship to patient: self    New or Established: new     Reason for visit: chest pain ,sob    Previous Cardiologist: Mir Green    PCP: bridget Simmons    Insurance name and ID: [employer] [id number]    Doctor requested: Robert Nguyễn    Name of hospital pt was admitted:[facility]    Timeframe instructed to follow-up within: 1 week     Best contact number: [phone]

## 2022-07-11 ENCOUNTER — OFFICE VISIT (OUTPATIENT)
Dept: WOUND CARE | Facility: HOSPITAL | Age: 62
End: 2022-07-11
Attending: SURGERY
Payer: COMMERCIAL

## 2022-07-11 VITALS — HEART RATE: 78 BPM | RESPIRATION RATE: 20 BRPM | TEMPERATURE: 98.8 F

## 2022-07-11 DIAGNOSIS — S91.301A OPEN WOUND OF RIGHT FOOT, INITIAL ENCOUNTER: Primary | ICD-10-CM

## 2022-07-11 DIAGNOSIS — E88.09 HYPOALBUMINEMIA: ICD-10-CM

## 2022-07-11 DIAGNOSIS — D84.9 IMMUNOCOMPROMISED STATE (CMS/HCC): ICD-10-CM

## 2022-07-11 PROCEDURE — G0463 HOSPITAL OUTPT CLINIC VISIT: HCPCS | Performed by: SURGERY

## 2022-07-11 PROCEDURE — 99204 OFFICE O/P NEW MOD 45 MIN: CPT | Performed by: SURGERY

## 2022-07-11 PROCEDURE — G0463 HOSPITAL OUTPT CLINIC VISIT: HCPCS

## 2022-07-11 RX ORDER — TACROLIMUS 1 MG/G
OINTMENT TOPICAL 2 TIMES DAILY
Qty: 100 G | Refills: 2 | Status: SHIPPED | OUTPATIENT
Start: 2022-07-11 | End: 2022-08-08

## 2022-07-11 ASSESSMENT — ENCOUNTER SYMPTOMS
ABDOMINAL DISTENTION: 0
FLANK PAIN: 0
FREQUENCY: 0
FEVER: 0
HEMATURIA: 0
JOINT SWELLING: 0
WOUND: 1
NUMBNESS: 0
COUGH: 0
COLOR CHANGE: 0
FATIGUE: 1
EYE DISCHARGE: 0
APPETITE CHANGE: 0
BRUISES/BLEEDS EASILY: 0
RHINORRHEA: 0
EYE REDNESS: 0
CONFUSION: 0
NERVOUS/ANXIOUS: 0
POLYDIPSIA: 0
SHORTNESS OF BREATH: 0
WEAKNESS: 0

## 2022-07-11 NOTE — PATIENT INSTRUCTIONS
Wound Healing Center Instructions    MEDICATIONS     Medication Note  Continue present medications as prescribed by the Wound Healing Center or other physicians you see. To avoid any problems keep the Wound Healing Center informed each visit of any medications changes that occur.     WOUND CARE     Clean Wound with: Soap and Water and Showering   Treatment 1   Location: OPEN WOUNDS  Dressing: Apply tacrolimus ointment to any open areas ans cover DRAINING sites with a dry dressing.  Dressing Care Frequency:  2 times a day   Care Provider: Self      PLEASE SEE DR. TRACY  AT [place] SEE FORM        Basic Principles      Wash your hands thoroughly with soap and water and after each dressing change. If someone other than the patient changes the dressing, it’s best to wear disposable gloves.   Do not get the wound or dressing wet.   To shower: remove the dressing, shower with soap and water (including washing the wound - do not use a washcloth), air dry, then redress the wound.  Do not take a tub bath  Keep all your dressings in a clean, covered container at home to avoid dust and contamination.  Discard used dressings in a plastic bag or covered trash container.  Check your wound and the surrounding skin at each dressing change for redness, warmth, swelling, increased pain, foul odor, fever, pus or abnormal drainage or discharge.  Notify Wound Healing Center if any of these changes occur - [phone].        Nutrition  Eat a well, balanced diet with adequate protein to support wound healing.   Take a multivitamin every day. Adequate nutrition supports healing and new tissue growth.  All diabetic patients should strive to keep blood sugars within a normal, practical range.   Elevated blood sugars can delay your wound healing.        ACTIVITY     Normal activity then rest and elevation  May shower  May walk    MOBILITY     independent

## 2022-07-11 NOTE — ASSESSMENT & PLAN NOTE
Patient has scleroderma and lupus.  She is not currently on any immunosuppressive medications    Patient known to have an autoimmune condition being treated with an immune system compromising medication, and it is recognized these factors will have a deleterious effect on wound healing speed and potential.  Additionally, with any open wound patient might be at increased risk for soft tissue or systemic infection.

## 2022-07-11 NOTE — ASSESSMENT & PLAN NOTE
Serum albumin level 2.8 on 6/7/2022    PLAN:  The patient, and/or any family member in attendance, is reminded that adequate and sufficient nutrition is essential to support good wound healing and the immune system.  In addition to a proper and balanced diet, I suggest the patient consume a minimum of two cans of Ensure or Boost daily.  In those patients with hepatic or renal dysfunction, they are directed to follow the advise of their gastroenterologist or nephrologist.

## 2022-07-11 NOTE — ASSESSMENT & PLAN NOTE
Rx: Tacrolimus ointment 0.1%, 100 g, apply to ulcerations 2-3 times daily- 2 refills    Plan:  Patient suggested to apply tacrolimus ointment to ulcerations 2-3 times daily.  Leave open to air.    She asked for a referral to a new rheumatologist.  I suggested she make an appointment at [facility] division

## 2022-07-11 NOTE — PROGRESS NOTES
Patient ID: Arielle Felix                            : 1960  MRN: [id number]                                            Visit Date: 2022  Encounter Provider: ZENOBIA Mensah  Referring Provider: No ref. provider found    Subjective      HPI  Arielle GUAJARDO is a 61 y.o. old female with a chief compliant of Skin Ulcer (Right medial ankle)   presenting today for evaluation and management of chronic wound of the lower extremity.  The patient was seen in [facility] ED on 2022 for evaluation of an ulceration of his right medial ankle.  The patient has a history of systemic lupus erythematosus and scleroderma and frequently has ulcers on his ankles and hands.  His respiratory rate at the time was 132.  An x-ray was performed at the site and there was no evidence of underlying osteomyelitis.  Patient was placed on mupirocin in addition to Santyl collagenase.  He has been cleaning the wound with hydrogen peroxide.  Patient then presented back to the emergency room on 2022 with chest pain he was evaluated by cardiology and subsequently discharged home with instructions for follow-up.    The following have been reviewed and updated as appropriate in this visit:   Tobacco  Allergies  Meds  Problems  Med Hx  Surg Hx  Fam Hx         Past Medical History:  has a past medical history of Hypertension, Lupus (CMS/HCC), and Scleroderma (CMS/HCC).  Past Surgical History:  has a past surgical history that includes Hernia repair.  Social History:   Social History     Tobacco Use   • Smoking status: Former Smoker   • Smokeless tobacco: Never Used   Substance Use Topics   • Alcohol use: Never   • Drug use: Never     Family History: family history is not on file.  Medications:   Current Outpatient Medications:   •  amLODIPine (NORVASC) 5 mg tablet, Take 10 mg by mouth daily., Disp: , Rfl:   •  biotin 1 mg tablet, Take 1,000 mcg by mouth., Disp: , Rfl:   •  cholecalciferol, vitamin D3, 1,000 unit (25  mcg) tablet, Take 1,000 Units by mouth daily., Disp: , Rfl:   •  collagenase (SantyL) ointment, Apply 1 application topically daily. Apply to your skin ulcers daily after cleansing with mild soap and water., Disp: 90 g, Rfl: 0  •  cyanocobalamin, vitamin B-12, 1,000 mcg capsule, Take 1,000 mcg by mouth daily., Disp: , Rfl:   •  docusate sodium (COLACE) 100 mg capsule, Take 100 mg by mouth daily., Disp: , Rfl:   •  levothyroxine (SYNTHROID) 100 mcg tablet, Take 100 mcg by mouth daily., Disp: , Rfl:   •  pantoprazole (PROTONIX) 40 mg EC tablet, Take 40 mg by mouth daily., Disp: , Rfl:   •  sodium chloride (SALINE WOUND WASH) 0.9 % solution, 1 application daily. Use to cleanse your wounds daily, Disp: 210 mL, Rfl: 0  •  tacrolimus (PROTOPIC) 0.1 % ointment, Apply topically 2 (two) times a day., Disp: 100 g, Rfl: 2    Allergies: is allergic to aspirin, ibuprofen, iodine, iodine and iodide containing products, penicillins, sulfa (sulfonamide antibiotics), clindamycin, hydrochlorothiazide, oxycodone, sulfamethoxazole-trimethoprim, and latex.     Review of Systems   Constitutional: Positive for fatigue. Negative for appetite change and fever.   HENT: Negative for congestion and rhinorrhea.    Eyes: Negative for discharge and redness.   Respiratory: Negative for cough and shortness of breath.    Cardiovascular: Negative for leg swelling.   Gastrointestinal: Negative for abdominal distention.   Endocrine: Negative for cold intolerance, heat intolerance and polydipsia.   Genitourinary: Negative for flank pain, frequency and hematuria.   Musculoskeletal: Negative for gait problem and joint swelling.   Skin: Positive for wound. Negative for color change.   Allergic/Immunologic: Negative for immunocompromised state.   Neurological: Negative for weakness and numbness.   Hematological: Does not bruise/bleed easily.   Psychiatric/Behavioral: Negative for confusion. The patient is not nervous/anxious.      Glucose   Date Value Ref  Range Status   06/27/2022 86 70 - 99 mg/dL Final     Hemoglobin   Date Value Ref Range Status   06/27/2022 11.2 (L) 11.8 - 15.7 g/dL Final     Albumin   Date Value Ref Range Status   06/27/2022 2.8 (L) 3.4 - 5.0 g/dL Final     Creatinine   Date Value Ref Range Status   06/27/2022 1.0 0.6 - 1.1 mg/dL Final   ]    Objective   Visit Vitals  Pulse 78   Temp 37.1 °C (98.8 °F)   Resp 20       Physical Exam  Vitals and nursing note reviewed.   Constitutional:       General: She is not in acute distress.     Appearance: Normal appearance.   HENT:      Head: Normocephalic and atraumatic.   Eyes:      Conjunctiva/sclera: Conjunctivae normal.      Pupils: Pupils are equal, round, and reactive to light.   Neck:      Trachea: Trachea normal.   Cardiovascular:      Rate and Rhythm: Regular rhythm.   Pulmonary:      Effort: Pulmonary effort is normal. No respiratory distress.      Breath sounds: No wheezing.   Abdominal:      Palpations: Abdomen is soft.      Tenderness: There is no guarding.   Musculoskeletal:         General: Swelling present. No deformity.      Cervical back: Normal range of motion. No edema or erythema.        Feet:    Skin:     General: Skin is warm.      Capillary Refill: Capillary refill takes less than 2 seconds.   Neurological:      Mental Status: She is alert and oriented to person, place, and time.      Motor: Weakness present.   Psychiatric:         Attention and Perception: She is attentive.         Mood and Affect: Affect is not inappropriate.         Speech: Speech normal.         Behavior: Behavior normal. Behavior is cooperative.       Scattered ulcerations medial right ankle      Assessment/Plan    Diagnosis Plan   1. Open wound of right foot, initial encounter     2. Immunocompromised state (CMS/HCC)     3. Hypoalbuminemia       Problem List Items Addressed This Visit     Immunocompromised state (CMS/HCC)     Patient has scleroderma and lupus.  She is not currently on any immunosuppressive  medications    Patient known to have an autoimmune condition being treated with an immune system compromising medication, and it is recognized these factors will have a deleterious effect on wound healing speed and potential.  Additionally, with any open wound patient might be at increased risk for soft tissue or systemic infection.           Hypoalbuminemia     Serum albumin level 2.8 on 6/7/2022    PLAN:  The patient, and/or any family member in attendance, is reminded that adequate and sufficient nutrition is essential to support good wound healing and the immune system.  In addition to a proper and balanced diet, I suggest the patient consume a minimum of two cans of Ensure or Boost daily.  In those patients with hepatic or renal dysfunction, they are directed to follow the advise of their gastroenterologist or nephrologist.           Open wound of right foot - Primary     Rx: Tacrolimus ointment 0.1%, 100 g, apply to ulcerations 2-3 times daily- 2 refills    Plan:  Patient suggested to apply tacrolimus ointment to ulcerations 2-3 times daily.  Leave open to air.    She asked for a referral to a new rheumatologist.  I suggested she make an appointment at [facility] division             This document was generated using speech recognition software. Please excuse any typographical errors.    Return in about 4 weeks (around 8/8/2022).     ZENOBIA Mensah MD

## 2022-07-18 RX ORDER — BALSAM PERU/CASTOR OIL
OINTMENT (GRAM) TOPICAL 2 TIMES DAILY
Qty: 56.7 G | Refills: 1 | Status: SHIPPED | OUTPATIENT
Start: 2022-07-18 | End: 2022-08-08

## 2022-07-18 NOTE — TELEPHONE ENCOUNTER
Insurance company denied  Tacrolimus    Rx: Venelex ointment, 60 g, apply to sites twice daily, 1 refill

## 2022-07-20 ENCOUNTER — TELEPHONE (OUTPATIENT)
Dept: WOUND CARE | Facility: HOSPITAL | Age: 62
End: 2022-07-20
Payer: COMMERCIAL

## 2022-07-20 ENCOUNTER — DOCUMENTATION (OUTPATIENT)
Dept: WOUND CARE | Facility: HOSPITAL | Age: 62
End: 2022-07-20
Payer: COMMERCIAL

## 2022-07-20 RX ORDER — GENTAMICIN SULFATE 1 MG/G
OINTMENT TOPICAL DAILY
Qty: 60 G | Refills: 1 | Status: SHIPPED | OUTPATIENT
Start: 2022-07-20 | End: 2022-08-08 | Stop reason: SDUPTHER

## 2022-07-20 NOTE — TELEPHONE ENCOUNTER
Patient called to ask what to use on wounds.  Patient aware that both Tacrolimus and Venelx were denied by her insurance.  Per Dr. Mensah, continue using Santyl and Mupiricon as before.  Pt requested new prescriptions to her [facility]'s pharmacy, [facility] Dr. Mensah aware.

## 2022-07-20 NOTE — TELEPHONE ENCOUNTER
Prescriptions for Santyl collagenase and gentamicin sent to patient's preferred [facility] pharmacy

## 2022-07-20 NOTE — PROGRESS NOTES
Patient's insurance denied feeling tacrolimus and Venelex.  Prior to coming to the [facility] she was applying Santyl collagenase compounded with mupirocin.  I have used tacrolimus and Venelex in previous cases where wounds are related to autoimmune condition, with good success.  It is unfortunate that the insurance company is putting up a fight.    I really have no other specialty medications to suggest to the patient.  Unfortunately, in this particular case insurance has pretty much dictated care.  I would suggest she return to using Santyl collagenase compounded with mupirocin with the expectation that over time some success will be achieved.

## 2022-08-02 NOTE — PROGRESS NOTES
Cardiology  Office Consult Note         Reason for visit:   Chief Complaint   Patient presents with   • Follow-up       HPI     Arielle Felix is a 61 y.o. female who presents to the office for cardiovascular evaluation. PMH: HTN, severe Raynaud's disease, scleroderma, pulmonary HTN, and interstitial lung disease.     She was previously followed by Dr. Jos Valdez at [facility] for cardiology. Stress test 8/6/2013 was negative for ischemia. Echo 5/30/2017 and 3/13/2020. Most recent Echo 4/21/2022 which showed LVEF: 55-60%, mild aortic valve thickening, pulmonary artery systolic pressure: 52 mmHg, and trivial pericardial effusion present. No changes from previous echo on 3/13/2020. LHC and RHC (separate occasions) over the last 5-10 years which showed normal hemodynamics and normal coronaries.  Last OV 9/3/2020.  RHC 9/2/2020.    She followed up with Dr. Judd Flesch at [facility] for pulmonary. PFTs 3/18/2020. Last OV 3/23/22. CT chest and echo ordered. She was to follow up with Dr. Giraldo of Rheumatology. Last OV with Dr. Giraldo 5/18/22.    On 6/27/2022, she was in [facility] ER for chest pain. EKG: NSR without ischemia. HST: 12, 16. D-Dimer: 0.56. CXR showed cardiomegaly and diffuse interstitial prominence. She was discharged to home and advise to follow up with cardiology.     Today, her daughter has accompanied her to the visit. Patient reports chest pain with taking a deep breath. She will have shortness of breath at rest and also with exertion. She notes B/L pedal and ankle edema. She has dizziness and lightheadedness related to her vertigo.She reports 2 nosebleeds recently.  Also, she reports coughing frequently and when she coughs she brings up large amounts of blood, and this happens at least once a week.  BP initially elevated and BP recheck: 138/84. She is currently seeing wound care. She is tolerating her medications without any adverse reactions.     Past Medical History:   Diagnosis Date   • De Quervain's  tenosynovitis    • Essential (primary) hypertension    • Follicular carcinoma of thyroid (CMS/HCC)    • Gastro-esophageal reflux    • Hand ulceration (CMS/HCC)    • Hyperlipidemia, unspecified    • Interstitial lung disease (CMS/HCC)    • Leg ulcer (CMS/HCC)    • Lung nodule    • Lupus (CMS/HCC)    • Osteomyelitis of hand (CMS/HCC)    • Osteoporosis    • Pulmonary hypertension (CMS/HCC)    • Raynaud's disease     Severe   • Reflux esophagitis    • Scleroderma (CMS/HCC)        Past Surgical History:   Procedure Laterality Date   • CARDIAC CATHETERIZATION     • HERNIA REPAIR          Social History     Tobacco Use   Smoking Status Former Smoker   • Quit date: 9/15/2005   • Years since quittin.9   Smokeless Tobacco Never Used   Tobacco Comment    Would smoke 1/2 of 1/2 PPD, quit in      Social History     Tobacco Use   • Smoking status: Former Smoker     Quit date: 9/15/2005     Years since quittin.9   • Smokeless tobacco: Never Used   • Tobacco comment: Would smoke 1/2 of 1/2 PPD, quit in    Substance Use Topics   • Alcohol use: Never   • Drug use: Never       Family History   Problem Relation Age of Onset   • Heart disease Biological Brother         stent       Allergies:  Aspirin, Ibuprofen, Iodine, Iodine and iodide containing products, Penicillins, Sulfa (sulfonamide antibiotics), Clindamycin, Hydrochlorothiazide, Oxycodone, Sulfamethoxazole-trimethoprim, and Latex    Current Outpatient Medications   Medication Sig Dispense Refill   • amLODIPine (NORVASC) 5 mg tablet Take 10 mg by mouth daily. Two 5 mg tablets in the AM and 2 tablet (5 mg) at night PRN.     • biotin 1 mg tablet Take 1,000 mcg by mouth.     • cholecalciferol, vitamin D3, 1,000 unit (25 mcg) tablet Take 1,000 Units by mouth daily.     • clobetasoL (TEMOVATE) 0.05 % ointment Apply topically 2 (two) times a day for 15 days. 45 g 3   • cyanocobalamin, vitamin B-12, 1,000 mcg capsule Take 1,000 mcg by mouth daily.     • docusate sodium  (COLACE) 100 mg capsule Take 100 mg by mouth daily.     • gentamicin (GARAMYCIN) 0.1 % ointment Apply topically daily. 60 g 1   • levothyroxine (SYNTHROID) 100 mcg tablet Take 100 mcg by mouth daily.     • pantoprazole (PROTONIX) 40 mg EC tablet Take 40 mg by mouth daily.     • sodium chloride (SALINE WOUND WASH) 0.9 % solution 1 application daily. Use to cleanse your wounds daily 210 mL 0     No current facility-administered medications for this visit.       Review of Systems   HENT: Positive for nosebleeds.    Cardiovascular: Positive for chest pain, dyspnea on exertion, irregular heartbeat, leg swelling and palpitations. Negative for near-syncope and syncope.        B/L pedal and ankle edema.    Respiratory: Positive for shortness of breath.    Neurological: Positive for dizziness, light-headedness and vertigo.       Objective     Vitals:    08/12/22 1122   BP: 138/84   Pulse: 95   SpO2: 98%       Wt Readings from Last 1 Encounters:   08/12/22 57.2 kg (126 lb)       Body surface area is 1.63 meters squared.    Body mass index is 20.34 kg/m².    Physical Exam  Constitutional:       Appearance: Normal appearance.   Neck:      Vascular: No carotid bruit or JVD.   Cardiovascular:      Rate and Rhythm: Normal rate and regular rhythm.      Pulses: Normal pulses.      Heart sounds: Murmur (1/6 CANDIS) heard.   Pulmonary:      Effort: Pulmonary effort is normal. No respiratory distress.      Breath sounds: Normal breath sounds.   Musculoskeletal:         General: No swelling.      Comments: Bilateral tip of the fingers erosion   Skin:     Comments: Gauze wrapped around ankle.   Thick facial and limb skin.   Neurological:      General: No focal deficit present.      Mental Status: She is alert and oriented to person, place, and time.           ECG   Sinus  Rhythm   Voltage criteria for LVH     -Old anterior infarct.    -  Nonspecific T-abnormality.     ABNORMAL     Hematology  Lab Results   Component Value Date    WBC 7.99  06/27/2022    HGB 11.2 (L) 06/27/2022    HCT 35.8 06/27/2022     06/27/2022       Chemistries  Lab Results   Component Value Date     06/27/2022    K 4.8 06/27/2022     06/27/2022    CREATININE 1.0 06/27/2022    BUN 14 06/27/2022    CO2 23 06/27/2022    GLUCOSE 86 06/27/2022    CALCIUM 9.1 06/27/2022    ALT 13 06/27/2022    AST 30 06/27/2022       Cholesterol  No results found for: CHOL, TRIG, HDL, LDLCALC, NONHDLCALC    Endocrine  No results found for: TSH, FREET4, HGBA1C    Assessment/Plan     Pulmonary hypertension (CMS/[facility])  Patient has been seen by pulmonary hypertension specialist at [facility], office visit from 9/2/2020 reviewed by me.  At that time a 6-minute walk test was consistent with NYHA class IV.  It appears that her condition progressed since then.  Now she has shortness of breath with walking shorter distances, associated also with chest pressure, and she reports at least once a week coughing up large amounts of blood.  I discussed with patient about taking her to the emergency department today but she declined.  I advised her to discuss with her pulmonologist about her coughing up blood.  She tells me that she did not follow-up with the pulmonary hypertension clinic in a while, I discussed with her the importance of following up with the pulmonary hypertension specialist at [facility] and the poor prognosis in patients with worsening pulmonary hypertension.  Patient asked to be referred to [facility] for her pulmonary hypertension.  We will get an echocardiogram, but she would likely need a right heart catheterization at some point.    Shortness of breath  She had a cardiac catheterization done at [facility] that showed patent coronary arteries.  ER visit from June of this year rule out an acute coronary syndrome.  I discussed with patient availability of cardiac work-up with stress test but we decided not to do it at this time and focus on her pulmonary hypertension.  We could also consider a CT  coronary angiogram but she reports significant allergy to IV dye in the past, she tells me she stopped breathing because of it.  We will get an echocardiogram.        Orders Placed This Encounter   Procedures   • ECG 12 lead     Scheduling Instructions:      PLEASE USE THIS ORDER FOR ECG'S PERFORMED IN PHYSICIAN OFFICES     Order Specific Question:   Release to patient     Answer:   Immediate   • Transthoracic echo (TTE) complete     With next visit     Standing Status:   Future     Standing Expiration Date:   8/12/2024     Order Specific Question:   Is Definity and/or agitated saline (bubbles) indicated for the study?     Answer:   No     Order Specific Question:   Release to patient     Answer:   Immediate              This note was completed in part utilizing a voice recognition software. Grammatical errors, random word insertion, spelling mistakes, and incomplete sentences may be an occasional consequence of the system secondary to software limitations, ambient noise and hardware issues.   Please read the chart carefully and recognize, using context, where substitutions have occurred. If you have any questions or concerns about the context, text or information contained within the body of this dictation, please contact myself, the provider, for further clarification.     I, Bautista Ribeiro, am scribing for, and in the presence of, Rboert Nguyễn MD.     IRobert MD, personally performed the services described in this documentation as scribed by Bautista Ribeiro in my presence, and it is both accurate and complete.     Robert Nguyễn MD  8/12/2022

## 2022-08-08 ENCOUNTER — OFFICE VISIT (OUTPATIENT)
Dept: WOUND CARE | Facility: HOSPITAL | Age: 62
End: 2022-08-08
Attending: SURGERY
Payer: COMMERCIAL

## 2022-08-08 VITALS — RESPIRATION RATE: 18 BRPM | TEMPERATURE: 97.5 F | HEART RATE: 102 BPM

## 2022-08-08 DIAGNOSIS — E88.09 HYPOALBUMINEMIA: ICD-10-CM

## 2022-08-08 DIAGNOSIS — D84.9 IMMUNOCOMPROMISED STATE (CMS/HCC): ICD-10-CM

## 2022-08-08 DIAGNOSIS — S91.301D OPEN WOUND OF RIGHT FOOT, SUBSEQUENT ENCOUNTER: Primary | ICD-10-CM

## 2022-08-08 PROCEDURE — G0463 HOSPITAL OUTPT CLINIC VISIT: HCPCS

## 2022-08-08 PROCEDURE — G0463 HOSPITAL OUTPT CLINIC VISIT: HCPCS | Performed by: SURGERY

## 2022-08-08 PROCEDURE — 99214 OFFICE O/P EST MOD 30 MIN: CPT | Performed by: SURGERY

## 2022-08-08 RX ORDER — CLOBETASOL PROPIONATE 0.5 MG/G
OINTMENT TOPICAL 2 TIMES DAILY
Qty: 45 G | Refills: 3 | Status: ON HOLD | OUTPATIENT
Start: 2022-08-08 | End: 2023-04-14 | Stop reason: ALTCHOICE

## 2022-08-08 RX ORDER — GENTAMICIN SULFATE 1 MG/G
OINTMENT TOPICAL DAILY
Qty: 60 G | Refills: 1 | Status: ON HOLD | OUTPATIENT
Start: 2022-08-08 | End: 2023-04-14 | Stop reason: ALTCHOICE

## 2022-08-08 ASSESSMENT — ENCOUNTER SYMPTOMS
EYE DISCHARGE: 0
COUGH: 0
JOINT SWELLING: 0
ABDOMINAL DISTENTION: 0
EYE REDNESS: 0
WOUND: 1
FATIGUE: 1
BRUISES/BLEEDS EASILY: 0
NERVOUS/ANXIOUS: 0
SHORTNESS OF BREATH: 0
WEAKNESS: 0
FEVER: 0
POLYDIPSIA: 0
CONFUSION: 0
NUMBNESS: 0
FREQUENCY: 0
COLOR CHANGE: 0
FLANK PAIN: 0
RHINORRHEA: 0
APPETITE CHANGE: 0
HEMATURIA: 0

## 2022-08-08 NOTE — PATIENT INSTRUCTIONS
Wound Healing Center Instructions    MEDICATIONS     Medication Note  Continue present medications as prescribed by the Wound Healing Center or other physicians you see. To avoid any problems keep the W[facility] Healing Center informed each visit of any medications changes that occur.     WOUND CARE     Clean Wound with: Soap and Water and Showering   Treatment 1   Location: OPEN WOUNDS  Dressing: Mix clobetasol with gentamicin, apply dry dressing   Dressing Care Frequency:  2 times a day   Care Provider: Self      PLEASE SEE DR. TRACY  AT [facility] SEE [facility]M        Basic Principles      Wash your hands thoroughly with soap and water and after each dressing change. If someone other than the patient changes the dressing, it’s best to wear disposable gloves.   Do not get the wound or dressing wet.   To shower: remove the dressing, shower with soap and water (including washing the wound - do not use a washcloth), air dry, then redress the wound.  Do not take a tub bath  Keep all your dressings in a clean, covered container at home to avoid dust and contamination.  Discard used dressings in a plastic bag or covered trash container.  Check your wound and the surrounding skin at each dressing change for redness, warmth, swelling, increased pain, foul odor, fever, pus or abnormal drainage or discharge.  Notify Wound Healing Center if any of these changes occur - [phone].        Nutrition  Eat a well, balanced diet with adequate protein to support wound healing.   Take a multivitamin every day. Adequate nutrition supports healing and new tissue growth.  All diabetic patients should strive to keep blood sugars within a normal, practical range.   Elevated blood sugars can delay your wound healing.        ACTIVITY     Normal activity then rest and elevation  May shower  May walk    MOBILITY     independent

## 2022-08-08 NOTE — ASSESSMENT & PLAN NOTE
Plan:  Rx: Clobetasol 0.05% ointment, 45 g, apply to wounds twice daily, 3 refills    Rx: Gentamicin 0.1% ointment, 60 g, apply to wounds twice daily, 3 refills    Patient will start compounding clobetasol with gentamicin ointment and apply to wounds twice daily.

## 2022-08-08 NOTE — PROGRESS NOTES
Patient ID: Arielle Felix                            : 1960  MRN: [id number]                                            Visit Date: 2022  Encounter Provider: ZENOBIA Mensah  Referring Provider: No ref. provider found    Subjective      HPI  Arielle GUAJARDO is a 61 y.o. old female with a chief compliant of Chronic Non-healing Wound (Autoimmune wounds at the hands and feet)   presenting today for evaluation and management of chronic wound of the lower extremity.  Unfortunately, patient's insurance company denied coverage for tacrolimus or Venelex.  She has autoimmune wounds of the feet and hands secondary to lupus and scleroderma.    The following have been reviewed and updated as appropriate in this visit:   Tobacco  Allergies  Meds  Problems  Med Hx  Surg Hx  Fam Hx         Past Medical History:  has a past medical history of Hypertension, Lupus (CMS/HCC), and Scleroderma (CMS/HCC).  Past Surgical History:  has a past surgical history that includes Hernia repair.  Social History:   Social History     Tobacco Use   • Smoking status: Former Smoker   • Smokeless tobacco: Never Used   Substance Use Topics   • Alcohol use: Never   • Drug use: Never     Family History: family history is not on file.  Medications:   Current Outpatient Medications:   •  clobetasoL (TEMOVATE) 0.05 % ointment, Apply topically 2 (two) times a day for 15 days., Disp: 45 g, Rfl: 3  •  gentamicin (GARAMYCIN) 0.1 % ointment, Apply topically daily., Disp: 60 g, Rfl: 1  •  amLODIPine (NORVASC) 5 mg tablet, Take 10 mg by mouth daily., Disp: , Rfl:   •  biotin 1 mg tablet, Take 1,000 mcg by mouth., Disp: , Rfl:   •  cholecalciferol, vitamin D3, 1,000 unit (25 mcg) tablet, Take 1,000 Units by mouth daily., Disp: , Rfl:   •  cyanocobalamin, vitamin B-12, 1,000 mcg capsule, Take 1,000 mcg by mouth daily., Disp: , Rfl:   •  docusate sodium (COLACE) 100 mg capsule, Take 100 mg by mouth daily., Disp: , Rfl:   •  levothyroxine  (SYNTHROID) 100 mcg tablet, Take 100 mcg by mouth daily., Disp: , Rfl:   •  pantoprazole (PROTONIX) 40 mg EC tablet, Take 40 mg by mouth daily., Disp: , Rfl:   •  sodium chloride (SALINE WOUND WASH) 0.9 % solution, 1 application daily. Use to cleanse your wounds daily, Disp: 210 mL, Rfl: 0    Allergies: is allergic to aspirin, ibuprofen, iodine, iodine and iodide containing products, penicillins, sulfa (sulfonamide antibiotics), clindamycin, hydrochlorothiazide, oxycodone, sulfamethoxazole-trimethoprim, and latex.     Review of Systems   Constitutional: Positive for fatigue. Negative for appetite change and fever.   HENT: Negative for congestion and rhinorrhea.    Eyes: Negative for discharge and redness.   Respiratory: Negative for cough and shortness of breath.    Cardiovascular: Negative for leg swelling.   Gastrointestinal: Negative for abdominal distention.   Endocrine: Negative for cold intolerance, heat intolerance and polydipsia.   Genitourinary: Negative for flank pain, frequency and hematuria.   Musculoskeletal: Negative for gait problem and joint swelling.   Skin: Positive for wound. Negative for color change.   Allergic/Immunologic: Negative for immunocompromised state.   Neurological: Negative for weakness and numbness.   Hematological: Does not bruise/bleed easily.   Psychiatric/Behavioral: Negative for confusion. The patient is not nervous/anxious.      Glucose   Date Value Ref Range Status   06/27/2022 86 70 - 99 mg/dL Final     Hemoglobin   Date Value Ref Range Status   06/27/2022 11.2 (L) 11.8 - 15.7 g/dL Final     Albumin   Date Value Ref Range Status   06/27/2022 2.8 (L) 3.4 - 5.0 g/dL Final     Creatinine   Date Value Ref Range Status   06/27/2022 1.0 0.6 - 1.1 mg/dL Final   ]    Objective   Visit Vitals  Pulse (!) 102   Temp 36.4 °C (97.5 °F)   Resp 18       Physical Exam  Vitals and nursing note reviewed.   Constitutional:       General: She is not in acute distress.     Appearance: Normal  appearance.   HENT:      Head: Normocephalic and atraumatic.   Eyes:      Conjunctiva/sclera: Conjunctivae normal.      Pupils: Pupils are equal, round, and reactive to light.   Neck:      Trachea: Trachea normal.   Cardiovascular:      Rate and Rhythm: Regular rhythm.   Pulmonary:      Effort: Pulmonary effort is normal. No respiratory distress.      Breath sounds: No wheezing.   Abdominal:      Palpations: Abdomen is soft.      Tenderness: There is no guarding.   Musculoskeletal:         General: No deformity.      Cervical back: Normal range of motion. No edema or erythema.        Feet:    Skin:     General: Skin is warm.      Capillary Refill: Capillary refill takes less than 2 seconds.   Neurological:      Mental Status: She is alert and oriented to person, place, and time. Mental status is at baseline.   Psychiatric:         Attention and Perception: She is attentive.         Mood and Affect: Affect is not inappropriate.         Speech: Speech normal.         Behavior: Behavior normal. Behavior is cooperative.       Scattered ulcerations medial right ankle      Assessment/Plan    Diagnosis Plan   1. Open wound of right foot, subsequent encounter     2. Hypoalbuminemia     3. Immunocompromised state (CMS/HCC)       Problem List Items Addressed This Visit     Immunocompromised state (CMS/HCC)     Patient has scleroderma and lupus.  She is not currently on any immunosuppressive medications    Patient known to have an autoimmune condition being treated with an immune system compromising medication, and it is recognized these factors will have a deleterious effect on wound healing speed and potential.  Additionally, with any open wound patient might be at increased risk for soft tissue or systemic infection.           Hypoalbuminemia     Serum albumin level 2.8 on 6/7/2022    PLAN:  The patient, and/or any family member in attendance, is reminded that adequate and sufficient nutrition is essential to support good  wound healing and the immune system.  In addition to a proper and balanced diet, I suggest the patient consume a minimum of two cans of Ensure or Boost daily.  In those patients with hepatic or renal dysfunction, they are directed to follow the advise of their gastroenterologist or nephrologist.           Open wound of right foot - Primary     Plan:  Rx: Clobetasol 0.05% ointment, 45 g, apply to wounds twice daily, 3 refills    Rx: Gentamicin 0.1% ointment, 60 g, apply to wounds twice daily, 3 refills    Patient will start compounding clobetasol with gentamicin ointment and apply to wounds twice daily.             This document was generated using speech recognition software. Please excuse any typographical errors.    Return to wound center in 2 to 3 weeks     ZENOBIA Mensah MD

## 2022-08-09 PROBLEM — I10 ESSENTIAL (PRIMARY) HYPERTENSION: Status: ACTIVE | Noted: 2022-08-09

## 2022-08-09 PROBLEM — I27.20 PULMONARY HYPERTENSION (CMS/HCC): Status: ACTIVE | Noted: 2022-08-09

## 2022-08-09 PROBLEM — I73.00 RAYNAUD'S DISEASE: Status: ACTIVE | Noted: 2022-08-09

## 2022-08-09 PROBLEM — E78.5 HYPERLIPIDEMIA, UNSPECIFIED: Status: ACTIVE | Noted: 2022-08-09

## 2022-08-12 ENCOUNTER — OFFICE VISIT (OUTPATIENT)
Dept: CARDIOLOGY | Facility: CLINIC | Age: 62
End: 2022-08-12
Payer: COMMERCIAL

## 2022-08-12 ENCOUNTER — TELEPHONE (OUTPATIENT)
Dept: CARDIOLOGY | Facility: CLINIC | Age: 62
End: 2022-08-12

## 2022-08-12 VITALS
HEART RATE: 95 BPM | HEIGHT: 66 IN | SYSTOLIC BLOOD PRESSURE: 138 MMHG | WEIGHT: 126 LBS | OXYGEN SATURATION: 98 % | BODY MASS INDEX: 20.25 KG/M2 | DIASTOLIC BLOOD PRESSURE: 84 MMHG

## 2022-08-12 DIAGNOSIS — E78.49 OTHER HYPERLIPIDEMIA: Primary | ICD-10-CM

## 2022-08-12 DIAGNOSIS — R06.02 SHORTNESS OF BREATH: ICD-10-CM

## 2022-08-12 DIAGNOSIS — I27.20 PULMONARY HYPERTENSION (CMS/HCC): ICD-10-CM

## 2022-08-12 DIAGNOSIS — I10 ESSENTIAL (PRIMARY) HYPERTENSION: ICD-10-CM

## 2022-08-12 PROCEDURE — 99203 OFFICE O/P NEW LOW 30 MIN: CPT | Performed by: INTERNAL MEDICINE

## 2022-08-12 PROCEDURE — 3008F BODY MASS INDEX DOCD: CPT | Performed by: INTERNAL MEDICINE

## 2022-08-12 PROCEDURE — 93000 ELECTROCARDIOGRAM COMPLETE: CPT | Performed by: INTERNAL MEDICINE

## 2022-08-12 ASSESSMENT — ENCOUNTER SYMPTOMS
NEAR-SYNCOPE: 0
DIZZINESS: 1
PALPITATIONS: 1
VERTIGO: 1
DYSPNEA ON EXERTION: 1
SYNCOPE: 0
SHORTNESS OF BREATH: 1
LIGHT-HEADEDNESS: 1
IRREGULAR HEARTBEAT: 1

## 2022-08-12 NOTE — ASSESSMENT & PLAN NOTE
She had a cardiac catheterization done at [facility] that showed patent coronary arteries.  ER visit from June of this year rule out an acute coronary syndrome.  I discussed with patient availability of cardiac work-up with stress test but we decided not to do it at this time and focus on her pulmonary hypertension.  We could also consider a CT coronary angiogram but she reports significant allergy to IV dye in the past, she tells me she stopped breathing because of it.  We will get an echocardiogram.

## 2022-08-12 NOTE — ASSESSMENT & PLAN NOTE
Patient has been seen by pulmonary hypertension specialist at [facility], office visit from 9/2/2020 reviewed by me.  At that time a 6-minute walk test was consistent with NYHA class IV.  It appears that her condition progressed since then.  Now she has shortness of breath with walking shorter distances, associated also with chest pressure, and she reports at least once a week coughing up large amounts of blood.  I discussed with patient about taking her to the emergency department today but she declined.  I advised her to discuss with her pulmonologist about her coughing up blood.  She tells me that she did not follow-up with the pulmonary hypertension clinic in a while, I discussed with her the importance of following up with the pulmonary hypertension specialist at [facility] and the poor prognosis in patients with worsening pulmonary hypertension.  Patient asked to be referred to [facility] for her pulmonary hypertension.  We will get an echocardiogram, but she would likely need a right heart catheterization at some point.

## 2022-08-22 ENCOUNTER — HOSPITAL ENCOUNTER (OUTPATIENT)
Dept: CARDIOLOGY | Facility: CLINIC | Age: 62
Discharge: HOME | End: 2022-08-22
Attending: INTERNAL MEDICINE
Payer: COMMERCIAL

## 2022-08-22 VITALS
DIASTOLIC BLOOD PRESSURE: 84 MMHG | SYSTOLIC BLOOD PRESSURE: 138 MMHG | HEIGHT: 66 IN | BODY MASS INDEX: 20.25 KG/M2 | WEIGHT: 126 LBS

## 2022-08-22 DIAGNOSIS — I27.20 PULMONARY HYPERTENSION (CMS/HCC): ICD-10-CM

## 2022-08-22 LAB
AORTIC ROOT ANNULUS: 2.8 CM
AORTIC VALVE MEAN VELOCITY: 1.23 M/S
AORTIC VALVE VELOCITY TIME INTEGRAL: 34.2 CM
ASCENDING AORTA: 3.29 CM
AV MEAN GRADIENT: 7 MMHG
AV PEAK GRADIENT: 14 MMHG
AV PEAK VELOCITY-S: 1.88 M/S
AV REG PEAK VEL: 4.58 M/S
AV REGURGITATION PRESSURE HALF TIME: 518 MS
AV VALVE AREA: 1.69 CM2
BSA FOR ECHO PROCEDURE: 1.63 M2
CUSP SEPARATION: 1.2 CM
DOP CALC LVOT STROKE VOLUME: 60.48 ML
E WAVE DECELERATION TIME: 211 MS
E/A RATIO: 0.8
E/E' RATIO: 16.6
E/LAT E' RATIO: 10.6
EDV (BP): 57.7 CM3
EF (A4C): 56.3 %
EF A2C: 55.3 %
EJECTION FRACTION: 56 %
EST RIGHT VENT SYSTOLIC PRESSURE BY TRICUSPID REGURGITATION JET: 53 MMHG
ESV (BP): 25.4 CM3
FRACTIONAL SHORTENING: 30.7 %
INTERVENTRICULAR SEPTUM: 1.1 CM
LA ESV (BP): 55.1 CM3
LA ESV INDEX (A2C): 33.62 CM3/M2
LA ESV INDEX (BP): 33.8 CM3/M2
LA/AORTA RATIO: 1.2
LAAS-AP2: 19 CM2
LAAS-AP4: 19.2 CM2
LAD 2D: 3.36 CM
LALD A4C: 5.23 CM
LALD A4C: 6.14 CM
LAV-S: 54.8 CM3
LEFT ATRIUM VOLUME INDEX: 28.96 CM3/M2
LEFT ATRIUM VOLUME: 47.2 CM3
LEFT INTERNAL DIMENSION IN SYSTOLE: 2.87 CM (ref 2.33–3.52)
LEFT VENTRICLE DIASTOLIC VOLUME INDEX: 38.71 CM3/M2
LEFT VENTRICLE DIASTOLIC VOLUME: 63.1 CM3
LEFT VENTRICLE SYSTOLIC VOLUME INDEX: 16.87 CM3/M2
LEFT VENTRICLE SYSTOLIC VOLUME: 27.5 CM3
LEFT VENTRICULAR INTERNAL DIMENSION IN DIASTOLE: 4.14 CM (ref 3.92–5.44)
LEFT VENTRICULAR POSTERIOR WALL IN END DIASTOLE: 0.91 CM (ref 0.51–0.95)
LV DIASTOLIC VOLUME: 52.6 CM3
LV ESV (APICAL 2 CHAMBER): 23.5 CM3
LVAD-AP2: 22.5 CM2
LVAD-AP4: 24.5 CM2
LVAS-AP2: 14.1 CM2
LVAS-AP4: 15.2 CM2
LVEDVI(A2C): 32.27 CM3/M2
LVEDVI(BP): 35.4 CM3/M2
LVESVI(A2C): 14.42 CM3/M2
LVESVI(BP): 15.58 CM3/M2
LVLD-AP2: 8.12 CM
LVLD-AP4: 8.08 CM
LVLS-AP2: 7.2 CM
LVLS-AP4: 7.25 CM
LVOT 2D: 1.88 CM
LVOT A: 2.78 CM2
LVOT MG: 3 MMHG
LVOT MV: 0.71 M/S
LVOT PEAK VELOCITY: 1.14 M/S
LVOT VTI: 21.8 CM
MV E'TISSUE VEL-LAT: 0.08 M/S
MV E'TISSUE VEL-MED: 0.05 M/S
MV PEAK A VEL: 1.03 M/S
MV PEAK E VEL: 0.8 M/S
MV VALVE AREA P 1/2 METHOD: 3.55 CM2
POSTERIOR WALL: 1.1 CM
RVOT VMAX: 0.59 M/S
SEPTAL TISSUE DOPPLER FREE WALL LATE DIA VELOCITY (APICAL 4 CHAMBER VIEW): 0.12 M/S
TR MAX PG: 50 MMHG
TRICUSPID VALVE PEAK REGURGITATION VELOCITY: 3.54 M/S
Z-SCORE OF LEFT VENTRICULAR DIMENSION IN END DIASTOLE: -1.09
Z-SCORE OF LEFT VENTRICULAR DIMENSION IN END SYSTOLE: 0.02
Z-SCORE OF LEFT VENTRICULAR POSTERIOR WALL IN END DIASTOLE: 1.42

## 2022-08-22 PROCEDURE — 93306 TTE W/DOPPLER COMPLETE: CPT | Performed by: INTERNAL MEDICINE

## 2022-08-22 NOTE — RESULT ENCOUNTER NOTE
Can you please let the patient know that I reviewed her echocardiogram and she has elevated pulmonary pressures as expected, but otherwise no major abnormalities.

## 2022-08-30 ENCOUNTER — OFFICE VISIT (OUTPATIENT)
Dept: WOUND CARE | Facility: HOSPITAL | Age: 62
End: 2022-08-30
Attending: SURGERY
Payer: COMMERCIAL

## 2022-08-30 VITALS — HEART RATE: 82 BPM | TEMPERATURE: 98.6 F | RESPIRATION RATE: 18 BRPM

## 2022-08-30 DIAGNOSIS — E88.09 HYPOALBUMINEMIA: ICD-10-CM

## 2022-08-30 DIAGNOSIS — S91.301D OPEN WOUND OF RIGHT FOOT, SUBSEQUENT ENCOUNTER: Primary | ICD-10-CM

## 2022-08-30 DIAGNOSIS — D84.9 IMMUNOCOMPROMISED STATE (CMS/HCC): ICD-10-CM

## 2022-08-30 PROCEDURE — G0463 HOSPITAL OUTPT CLINIC VISIT: HCPCS | Performed by: SURGERY

## 2022-08-30 PROCEDURE — A6212 FOAM DRG <=16 SQ IN W/BORDER: HCPCS

## 2022-08-30 PROCEDURE — G0463 HOSPITAL OUTPT CLINIC VISIT: HCPCS

## 2022-08-30 PROCEDURE — 99213 OFFICE O/P EST LOW 20 MIN: CPT | Performed by: SURGERY

## 2022-08-30 ASSESSMENT — ENCOUNTER SYMPTOMS
EYE DISCHARGE: 0
ABDOMINAL DISTENTION: 0
JOINT SWELLING: 0
POLYDIPSIA: 0
FATIGUE: 1
NUMBNESS: 0
BRUISES/BLEEDS EASILY: 0
FLANK PAIN: 0
NERVOUS/ANXIOUS: 0
SHORTNESS OF BREATH: 0
HEMATURIA: 0
FREQUENCY: 0
APPETITE CHANGE: 0
WEAKNESS: 0
CONFUSION: 0
COUGH: 0
RHINORRHEA: 0
EYE REDNESS: 0
FEVER: 0
COLOR CHANGE: 0
WOUND: 1

## 2022-08-30 NOTE — PATIENT INSTRUCTIONS
W[facility] Healing Center Instructions    MEDICATIONS     Medication Note  Continue present medications as prescribed by the Wound Healing Center or other physicians you see. To avoid any problems keep the Wound Healing Center informed each visit of any medications changes that occur.     WOUND CARE     Clean Wound with: Soap and Water and Showering   Treatment 1   Location: OPEN WOUNDS  Dressing: Mix clobetasol with gentamicin, apply dry dressing   Dressing Care Frequency:  2 times a day   Care Provider: Self      PLEASE SEE DR. TRACY  AT [facility]M        Basic Principles      Wash your hands thoroughly with soap and water and after each dressing change. If someone other than the patient changes the dressing, it’s best to wear disposable gloves.   Do not get the wound or dressing wet.   To shower: remove the dressing, shower with soap and water (including washing the wound - do not use a washcloth), air dry, then redress the wound.  Do not take a tub bath  Keep all your dressings in a clean, covered container at home to avoid dust and contamination.  Discard used dressings in a plastic bag or covered trash container.  Check your wound and the surrounding skin at each dressing change for redness, warmth, swelling, increased pain, foul odor, fever, pus or abnormal drainage or discharge.  Notify Wound Healing Center if any of these changes occur - [phone].        Nutrition  Eat a well, balanced diet with adequate protein to support wound healing.   Take a multivitamin every day. Adequate nutrition supports healing and new tissue growth.  All diabetic patients should strive to keep blood sugars within a normal, practical range.   Elevated blood sugars can delay your wound healing.        ACTIVITY     Normal activity then rest and elevation  May shower  May walk    MOBILITY     independent

## 2022-08-30 NOTE — PROGRESS NOTES
Patient ID: Arielle Felix                            : 1960  MRN: [id number]                                            Visit Date: 2022  Encounter Provider: ZENOBIA Mensah  Referring Provider: No ref. provider found    Subjective      HPI  Arielle GUAJARDO is a 61 y.o. old female with a chief compliant of Chronic Non-healing Wound (Multiple wounds feet and hands, secondary to lupus)   presenting today for evaluation and management of chronic wound of the lower extremity.  Patient has been mixing equal portions of clobetasol ointment with gentamicin ointment as a substitute for Xenaderm or tacrolimus.  Wounds are extremely painful.    The following have been reviewed and updated as appropriate in this visit:   Tobacco  Allergies  Meds  Problems  Med Hx  Surg Hx  Fam Hx         Past Medical History:  has a past medical history of De Quervain's tenosynovitis, Essential (primary) hypertension, Follicular carcinoma of thyroid (CMS/HCC), Gastro-esophageal reflux, Hand ulceration (CMS/HCC), Hyperlipidemia, unspecified, Interstitial lung disease (CMS/HCC), Leg ulcer (CMS/HCC), Lung nodule, Lupus (CMS/HCC), Osteomyelitis of hand (CMS/HCC), Osteoporosis, Pulmonary hypertension (CMS/HCC), Raynaud's disease, Reflux esophagitis, and Scleroderma (CMS/HCC).  Past Surgical History:  has a past surgical history that includes Hernia repair and Cardiac catheterization.  Social History:   Social History     Tobacco Use   • Smoking status: Former Smoker     Quit date: 9/15/2005     Years since quittin.9   • Smokeless tobacco: Never Used   • Tobacco comment: Would smoke 1/2 of 1/2 PPD, quit in    Substance Use Topics   • Alcohol use: Never   • Drug use: Never     Family History: family history includes Heart disease in her biological brother.  Medications:   Current Outpatient Medications:   •  amLODIPine (NORVASC) 5 mg tablet, Take 10 mg by mouth daily. Two 5 mg tablets in the AM and 2 tablet (5  mg) at night PRN., Disp: , Rfl:   •  biotin 1 mg tablet, Take 1,000 mcg by mouth., Disp: , Rfl:   •  cholecalciferol, vitamin D3, 1,000 unit (25 mcg) tablet, Take 1,000 Units by mouth daily., Disp: , Rfl:   •  clobetasoL (TEMOVATE) 0.05 % ointment, Apply topically 2 (two) times a day for 15 days., Disp: 45 g, Rfl: 3  •  cyanocobalamin, vitamin B-12, 1,000 mcg capsule, Take 1,000 mcg by mouth daily., Disp: , Rfl:   •  docusate sodium (COLACE) 100 mg capsule, Take 100 mg by mouth daily., Disp: , Rfl:   •  gentamicin (GARAMYCIN) 0.1 % ointment, Apply topically daily., Disp: 60 g, Rfl: 1  •  levothyroxine (SYNTHROID) 100 mcg tablet, Take 100 mcg by mouth daily., Disp: , Rfl:   •  pantoprazole (PROTONIX) 40 mg EC tablet, Take 40 mg by mouth daily., Disp: , Rfl:   •  sodium chloride (SALINE WOUND WASH) 0.9 % solution, 1 application daily. Use to cleanse your wounds daily, Disp: 210 mL, Rfl: 0    Allergies: is allergic to aspirin, ibuprofen, iodine, iodine and iodide containing products, penicillins, sulfa (sulfonamide antibiotics), clindamycin, hydrochlorothiazide, oxycodone, sulfamethoxazole-trimethoprim, and latex.     Review of Systems   Constitutional: Positive for fatigue. Negative for appetite change and fever.   HENT: Negative for congestion and rhinorrhea.    Eyes: Negative for discharge and redness.   Respiratory: Negative for cough and shortness of breath.    Cardiovascular: Negative for leg swelling.   Gastrointestinal: Negative for abdominal distention.   Endocrine: Negative for cold intolerance, heat intolerance and polydipsia.   Genitourinary: Negative for flank pain, frequency and hematuria.   Musculoskeletal: Negative for gait problem and joint swelling.   Skin: Positive for wound. Negative for color change.   Allergic/Immunologic: Negative for immunocompromised state.   Neurological: Negative for weakness and numbness.   Hematological: Does not bruise/bleed easily.   Psychiatric/Behavioral: Negative for  confusion. The patient is not nervous/anxious.      Glucose   Date Value Ref Range Status   06/27/2022 86 70 - 99 mg/dL Final     Hemoglobin   Date Value Ref Range Status   06/27/2022 11.2 (L) 11.8 - 15.7 g/dL Final     Albumin   Date Value Ref Range Status   06/27/2022 2.8 (L) 3.4 - 5.0 g/dL Final     Creatinine   Date Value Ref Range Status   06/27/2022 1.0 0.6 - 1.1 mg/dL Final   ]    Objective   Visit Vitals  Pulse 82   Temp 37 °C (98.6 °F)   Resp 18       Physical Exam  Vitals and nursing note reviewed.   Constitutional:       General: She is not in acute distress.     Appearance: Normal appearance.   HENT:      Head: Normocephalic and atraumatic.   Eyes:      Conjunctiva/sclera: Conjunctivae normal.      Pupils: Pupils are equal, round, and reactive to light.   Neck:      Trachea: Trachea normal.   Cardiovascular:      Rate and Rhythm: Regular rhythm.   Pulmonary:      Effort: Pulmonary effort is normal. No respiratory distress.      Breath sounds: No wheezing.   Abdominal:      Palpations: Abdomen is soft.      Tenderness: There is no guarding.   Musculoskeletal:         General: Swelling present. No deformity.      Cervical back: Normal range of motion. No edema or erythema.      Right lower leg: Edema present.      Left lower leg: Edema present.        Legs:    Skin:     General: Skin is warm.      Capillary Refill: Capillary refill takes less than 2 seconds.   Neurological:      Mental Status: She is alert and oriented to person, place, and time.      Motor: Weakness present.      Gait: Gait abnormal.   Psychiatric:         Attention and Perception: She is attentive.         Mood and Affect: Affect is not inappropriate.         Speech: Speech normal.         Behavior: Behavior normal. Behavior is cooperative.       Scattered ulcerations medial right ankle      Assessment/Plan    Diagnosis Plan   1. Open wound of right foot, subsequent encounter     2. Hypoalbuminemia     3. Immunocompromised state  (CMS/[facility])       Problem List Items Addressed This Visit     Immunocompromised state (CMS/[facility])     Patient has scleroderma and lupus.  She is not currently on any immunosuppressive medications    Patient known to have an autoimmune condition being treated with an immune system compromising medication, and it is recognized these factors will have a deleterious effect on wound healing speed and potential.  Additionally, with any open wound patient might be at increased risk for soft tissue or systemic infection.           Hypoalbuminemia     Serum albumin level 2.8 on 6/7/2022    PLAN:  The patient, and/or any family member in attendance, is reminded that adequate and sufficient nutrition is essential to support good wound healing and the immune system.  In addition to a proper and balanced diet, I suggest the patient consume a minimum of two cans of Ensure or Boost daily.  In those patients with hepatic or renal dysfunction, they are directed to follow the advise of their gastroenterologist or nephrologist.           Open wound of right foot - Primary     Plan:  Patient will continue compounding equal portions of clobetasol with gentamicin ointment and apply to symptomatic areas of her hands and feet.  Application can be up to 3 times daily.    I gave the patient some samples of Venelex to try.  Suggest applying twice daily and try to keep dressings off if possible.  If Venelex seems to be helpful we can see if the drug rep in the area can get more samples.  If the Venelex is of no benefit, then she can go back to applying clobetasol and gentamicin ointment simultaneously.             This document was generated using speech recognition software. Please excuse any typographical errors.    Return to wound center in 3-4 weeks     ZENOBIA Mensah MD

## 2022-08-30 NOTE — ASSESSMENT & PLAN NOTE
Plan:  Patient will continue compounding equal portions of clobetasol with gentamicin ointment and apply to symptomatic areas of her hands and feet.  Application can be up to 3 times daily.    I gave the patient some samples of Venelex to try.  Suggest applying twice daily and try to keep dressings off if possible.  If Venelex seems to be helpful we can see if the drug rep in the area can get more samples.  If the Venelex is of no benefit, then she can go back to applying clobetasol and gentamicin ointment simultaneously.

## 2022-10-05 NOTE — PROGRESS NOTES
I had the pleasure of meeting and evaluating Arielle Felix (: 1960) in the [facility] Heart Failure and Pulmonary Hypertension clinic on 10/7/2022. She presents today with her daughter, Stephanie, who also provided history.     Ms. Felix is a 61 y.o. female with a past medical history of Pulmonary HTN, HTN, Raynaud's Disease, Cutaneous Systemic Scleroderma (dx ), ILD. She is a patient of Dr. Nguyễn. She was previously followed by Dr. Jos Valdez at [facility]. She had stress test 2013 was negative for ischemia. Echocardiogram from 2022 showed LVEF: 55-60%, mild aortic valve thickening, pulmonary artery systolic pressure: 52 mmHg and trivial pericardial effusion. LHC and RHC (separate occasions) over the last 5-10 years which showed normal hemodynamics and normal coronaries. She had a RHC 2020 showing top-normal right sided filling pressures with mild pulmonary hypertension, top-normal PVR and normal pulmonary capillary wedge pressure. The cardiac index was normal, seen below.     She followed up with Dr. Judd Flesch at [facility] Pulmonology. PFTs 3/18/2020. Last OV 3/23/22. CT chest and echo ordered. She was to follow up with Dr. Giraldo of Rheumatology. (Last telemedicine visit with Dr. Giraldo 22.)    On 2022, she was in [facility] for chest pain. EKG: NSR without ischemic changes. hsTrop: 12->16, d-dimer: 0.56, CXR showed cardiomegaly and diffuse interstitial prominence. She was discharged home and advised to follow up with cardiology.     Dr. Nguyễn ordered echocardiogram, which was completed on 2022 and showed estimated RVSP = 50-55 mmHg, normal-sized LV with normal LV systolic function. Estimated EF 55- 60%. Wall motion grossly normal, mild concentric left ventricular hypertrophy, grade I LV diastolic dysfunction, probably normal RV size and function.    She was referred here for further evaluation and management. She reports that she feels very short of breath  "with minimal activity most of the time, which started about a year ago and has progressively gotten worse. She reports that she has some good days and some bad days. One year ago, she was able to tolerate activities without limitations. She describes PND and bendopnea. She reports that she experiences ankle swelling, worsening over the past year and usually worse towards the end of the night. She does also reports some swelling in her abdomen. She complains of daily palpations. She repots she has been losing weight (as much as 60 lbs over the past year) but states that her appetite is \"good\".     She reports history of Vertigo and takes Meclizine as needed.     She denies any history of VTE     Rheumatology: En[facility] (\[facility]")  Pulmonology: Flesch    I reviewed available past medical records and have summarized here as appropriate.     Past Medical / Surgical History:  Pulmonary HTN, HTN, Raynaud's Disease, Cutaneous Systemic Scleroderma (dx 1998), ILD, Follicular Carcinoma of Thyroid, Tenosynovitis, de Quervain, h/o Hernia Repair, h/o Appendicitis, h/o Digit Amputation, H/o Total Thyroidectomy (Dr. Pacheco at \[facility]"; 4/2013)    Family & Social History: She is , she has two children. She works as an .     Tobacco: former 2005  EtOH: never   Illicits: never     Her brother has CAD with stent  1st cousin with SLE  Both parents had \"heart problems\"    Allergies:   Allergies   Allergen Reactions    Aspirin Shortness of breath     Other reaction(s): Unknown  \"stop breathing\"      Ibuprofen Shortness of breath     Stop breathing    Iodine Shortness of breath     Other reaction(s): Unknown    Iodine And Iodide Containing Products Shortness of breath     ? rash    Penicillins Shortness of breath     Other reaction(s): Unknown    Sulfa (Sulfonamide Antibiotics) Angioedema    Clindamycin     Hydrochlorothiazide      Other reaction(s): Abdominal Pain   Slow heart rate    Oxycodone      Other reaction(s): " Vomiting    Sulfamethoxazole-Trimethoprim      Other reaction(s): Vomiting, Weakness     Latex Rash       Medications:   Current Outpatient Medications   Medication Sig Dispense Refill    amLODIPine (NORVASC) 5 mg tablet Take 10 mg by mouth daily. Two 5 mg tablets in the AM and 2 tablet (5 mg) at night PRN.      balsam peru-castor oiL (VENELEX) ointment Apply topically 2 (two) times a day. 60 g 5    biotin 1 mg tablet Take 1,000 mcg by mouth.      cholecalciferol, vitamin D3, 1,000 unit (25 mcg) tablet Take 1,000 Units by mouth daily.      cyanocobalamin, vitamin B-12, 1,000 mcg capsule Take 1,000 mcg by mouth daily.      docusate sodium (COLACE) 100 mg capsule Take 100 mg by mouth daily.      gentamicin (GARAMYCIN) 0.1 % ointment Apply topically daily. 60 g 1    levothyroxine (SYNTHROID) 100 mcg tablet Take 100 mcg by mouth daily.      meclizine HCl (MECLIZINE ORAL) Take by mouth.      pantoprazole (PROTONIX) 40 mg EC tablet Take 40 mg by mouth daily.      sodium chloride (SALINE WOUND WASH) 0.9 % solution 1 application daily. Use to cleanse your wounds daily 210 mL 0    clobetasoL (TEMOVATE) 0.05 % ointment Apply topically 2 (two) times a day for 15 days. 45 g 3     No current facility-administered medications for this visit.       Review of Systems:   All other systems reviewed and are negative except as per HPI.    Physical Exam:     Wt Readings from Last 10 Encounters:   10/07/22 56.2 kg (124 lb)   08/22/22 57.2 kg (126 lb)   08/12/22 57.2 kg (126 lb)   06/27/22 57.6 kg (127 lb)   06/18/22 56.7 kg (125 lb)   11/27/20 63.5 kg (140 lb)       BP Readings from Last 5 Encounters:   10/07/22 140/81   08/22/22 138/84   08/12/22 138/84   06/27/22 (!) 145/69   06/18/22 (!) 164/75       Vitals:    10/07/22 1133   BP: 140/81   Pulse: (!) 107       Body mass index is 20.01 kg/m².  Body surface area is 1.62 meters squared.    Constitutional: NAD, AAOx3  HENT: Normocephalic, atraumatic head, sclerae anicteric,  no cervical lymphadenopathy, trachea midline  Cardiovascular: RRR, no murmurs, rubs or gallops, JVP: 9-10 mmHg   cm H2O, no carotid bruits.  Respiratory: CTA bilateral lung fields, no wheezes, rales or rhonchi  GI: soft, non-tender/non-distended  Musculoskeletal / Extremities: no peripheral edema, +2 pulses bilateral radial, DP/PT arteries  Skin: no rashes  Neuro / Psych: no focal deficits, CNII-XII grossly intact, appropriate, cooperative    Diagnostic Data:     Component      Latest Ref Rng & Units 6/27/2022 6/27/2022           3:57 PM  5:53 PM   High Sens Troponin I      <15 pg/mL 12 16 (H)      Component      Latest Ref Rng & Units 6/27/2022   BNP      <=100 pg/mL 111 (H)      Component      Latest Ref Rng & Units 6/27/2022   WBC      3.80 - 10.50 K/uL 7.99   RBC      3.93 - 5.22 M/uL 4.05   Hemoglobin      11.8 - 15.7 g/dL 11.2 (L)   Hematocrit      35.0 - 45.0 % 35.8   MCV      83.0 - 98.0 fL 88.4   MCH      28.0 - 33.2 pg 27.7 (L)   MCHC      32.2 - 35.5 g/dL 31.3 (L)   RDW      11.7 - 14.4 % 15.6 (H)   Platelets      150 - 369 K/uL 243      Component      Latest Ref Rng & Units 6/27/2022   Sodium      136 - 144 mEQ/L 138   Potassium      3.6 - 5.1 mEQ/L 4.8   Chloride      98 - 109 mEQ/L 107   CO2      22 - 32 mEQ/L 23   BUN      8 - 20 mg/dL 14   Creatinine      0.6 - 1.1 mg/dL 1.0   Glucose      70 - 99 mg/dL 86   Calcium      8.9 - 10.3 mg/dL 9.1   AST (SGOT)      15 - 41 IU/L 30   ALT (SGPT)      11 - 54 IU/L 13   Alkaline Phosphatase      35 - 126 IU/L 71   Total Protein      6.0 - 8.2 g/dL 8.3 (H)   Albumin      3.4 - 5.0 g/dL 2.8 (L)   Bilirubin, Total      0.3 - 1.2 mg/dL 0.7   eGFR      >=60.0 mL/min/1.73m*2 >60.0   Anion Gap      3 - 15 mEQ/L 8                                          ECG (10/7/2022, personally reviewed): Sinus Tachycardia (HR: 107 bpm), ANA, old anterior infarct pattern, non-specific T-wave abnormality     TTE (8/22/2022, personally reviewed):  · Normal-sized LV. Normal LV  systolic function. Estimated EF 55- 60%. Wall motion appears grossly normal. Mild concentric left ventricular hypertrophy. Grade I LV diastolic dysfunction.  · Pulmonic valve not well visualized. Grossly normal pulmonic valve structure. Trace pulmonic valve regurgitation. No pulmonic valve stenosis.  · Aortic valve not well visualized. Aortic valve not well seen but likely trileaflet. Focal aortic valve calcification present. Sclerotic aortic valve leaflets. Mild aortic valve regurgitation. No aortic valve stenosis.  · RV not well visualized. Normal-sized RV. Normal RV systolic function.  · Normal-sized RA.  · There is a small loculated pericardial effusion anterior to the right atrium. No cardiac tamponade.  · Sclerotic mitral valve. Mitral valve calcification of the anterior leaflet present.  · Trace mitral valve regurgitation.  · No significant mitral valve stenosis.  · Normal-sized IVC. IVC demonstrates normal respiratory collapse.  · Tricuspid valve structure is grossly normal. Mild to moderate tricuspid valve regurgitation.  · Estimated RVSP = 50-55 mmHg.  · Mildly dilated LA.  · No previous echocardiogram available for comparison.     TTE (4/21/2022, outside study):  This result has an attachment that is not available.     A complete transthoracic echocardiogram (including 2D, color flow   Doppler, spectral Doppler and M-mode imaging) was performed using the   standard protocol. The study quality was adequate.     The left ventricle is normal in size. There is moderately increased   wall thickness consistent with moderate concentric hypertrophy.   Endocardium is incompletely visualized, but no regional wall motion   abnormalities are noted in the visualized segments. Normal left   ventricular ejection fraction. The left ventricular ejection fraction by   visual estimate is 55% to 60%.     The right ventricle is normal in size. There is normal function of the   right ventricle.     The left atrium is  normal in size. Left atrial volume is 58 mL.     The right atrial volume measures 52 mL. The right atrial volume index   measures 34 mL/m2.     There is trace mitral regurgitation. There is no mitral stenosis.     The aortic valve is trileaflet. There is mild aortic valve thickening.   There is no aortic stenosis. There is trace aortic regurgitation.     There is mild to moderate tricuspid regurgitation. Pulmonary artery   systolic pressure measures 52 mmHg. The pulmonary artery systolic pressure   is moderately elevated.     The aorta measures 3.2 cm at the sinus of Valsalva. The proximal   segment of the ascending aorta is mildly enlarged. The proximal segment of   the ascending aorta measures 3.4 cm. The proximal segment of the ascending   aorta measures 2.2 cm/m2, indexed for body surface area.     Trivial pericardial effusion present. There is no echocardiographic   evidence of cardiac tamponade.     The inferior vena cava is normal in size (diameter <21 mm) and   decreases >50% in size with inspiration, suggesting a normal right atrial   pressure of 3 mmHg (range 0-5 mm Hg).     Compared to the prior study of 3/13/2020, there are no significant   changes.        PFT (3/23/2022):      PFT (7/14/2021):      CT Chest (5/2/2021, outside study):  CLINICAL INFORMATION: Systemic sclerosis. Interstitial lung disease. Groundglass nodule     PROCEDURE: Unenhanced CT of the chest was performed using thin section reconstructions. Images were obtained on inspiration and expiration.     COMPARISON: June and August 2020     FINDINGS:     LUNGS, PLEURA:     Groundglass opacities with reticulation with subpleural sparing in the lower lobes, without honeycombing or architectural distortion, unchanged.     Right upper lobe groundglass nodule, image 114 of series 3, 8 x 11 mm, previously 6 mm.     Expiratory images are of diagnostic quality and do not demonstrate evidence of clinically significant air trapping.      CARDIOVASCULAR, MEDIASTINUM, THYROID: Coronary calcifications. Trace pericardial effusion.     LYMPH NODES: Subcentimeter mediastinal lymph nodes, unchanged. Unchanged axillary lymph nodes.     SKELETON, CHEST WALL: No destructive bone lesion     UPPER ABDOMEN: Patulous esophagus. 3 mm right renal stone.       RHC (9/02/2020):  RA   8 mmHg   RV   40/5 mmHg   PA (mean)  44/16 (25) mmHg   PCWP   12 mmHg   CO   4.65 lpm (Thermodilution)   CI   2.65 lpm/m-squared   SVI    33 ml/beat/m-squared (lower limit normal 29)   PVR   2.8 mmHg/l/min (Wood units)   SVR   2064 dyne-sec-cm-5         PFT (3/18/2020):      TTE (3/13/2020, outside study):  · The study quality was good.   · The left ventricle is normal in size. Top normal left ventricular wall   thickness. There are no segmental wall motion abnormalities. The left   ventricular ejection fraction is 55-60% by visual estimate and 3D   echocardiography. Normal left ventricular ejection fraction.   · There is grade I (mild) LV diastolic dysfunction.   · The right ventricle is normal in size. There is normal function of the   right ventricle.   · The right atrium is mildly dilated.   · There is mild mitral valve anterior leaflet thickening. There is mild   mitral valve leaflet calcification. There is flattening of the mitral   leaflets without raphael prolapse. There is trace mitral regurgitation.   · The aortic valve is trileaflet. There is mild thickening of the aortic   valve. There is mild calcification of the aortic valve. There is no aortic   /stenosis. There is trace aortic regurgitation.   · There is mild tricuspid valve leaflet thickening. There is mild to   moderate tricuspid regurgitation. The pulmonary artery systolic pressure   is moderately elevated. Pulmonary artery systolic pressure measures 50   mmHg. There is mid-systolic notching of the RVOT VTI consistent with   elevated pulmonary vascular resistance.   · The inferior vena cava is normal in size  (diameter <21 mm) and decreases   >50% in size with inspiration, suggesting a normal right atrial pressure   of 3 mmHg (range 0-5 mm Hg).   · Trivial loculated pericardial effusion present adjacent to the right   ventricle and adjacent to the right atrium.          Assessment / Plan:     1. Pulmonary Hypertension (WHO Group I, NYHA/WHO FC II/III): Occurring in the background of Systemic Scleroderma with ILD. August 2022 TTE showed normal RV size and function, but progressive exertional decline, worsened DLCO and resting tachycardia suggest there may be progression of her pulmonary vascular disease and limitation of cardiac output. I would like to repeat a right heart catheterization to have an updated characterization of her PH as well as learn candidacy for pulmonary vasodilators. I have discussed with her the possibility of starting pulmonary vasodilators, if appropriate, after RHC is completed.     2. HTN: Mildly elevated. She is continued on Amlodipine.     3. Systemic Scleroderma / Raynaud's Disease: Per Rheumatology (Dr. Giraldo).      4. ILD: Per Pulmonology (Dr. Flesch) and Rheumatology (Dr. Giraldo).      Thank you for allowing me to participate in the care of your patient. I would like to see her in the office in 3 months.     Please do not hesitate to contact me with any questions or issues.     Sincerely,     Timothy Arvizu, DO  Cardiology / Heart Failure / Pulmonary Hypertension    [email or web address]    [phone]    [facility]  [facility]  [address]   [place] [address]      Counseling and coordination of care consisted of more than 50% of this complex medical evaluation; numerous diagnostic and management options were considered and testing results were reviewed and discussed with the patient as best as able, during this encounter; the patient has multiple medical problems and is on numerous medications; the patient has a high risk of complications due to their cardiac  "disease, and all these resulted in the assigned \"complex medical decision-making\" characterization and coding of this patient encounter.  I spent 62 minutes on this date of service performing the following activities: obtaining history, performing examination, entering orders, documenting, preparing for visit, obtaining / reviewing records, providing counseling and education and independently reviewing study/studies.  "

## 2022-10-07 ENCOUNTER — TELEPHONE (OUTPATIENT)
Dept: CARDIOLOGY | Facility: CLINIC | Age: 62
End: 2022-10-07

## 2022-10-07 ENCOUNTER — OFFICE VISIT (OUTPATIENT)
Dept: WOUND CARE | Facility: HOSPITAL | Age: 62
End: 2022-10-07
Attending: SURGERY
Payer: COMMERCIAL

## 2022-10-07 ENCOUNTER — OFFICE VISIT (OUTPATIENT)
Dept: CARDIOLOGY | Facility: CLINIC | Age: 62
End: 2022-10-07
Payer: COMMERCIAL

## 2022-10-07 VITALS
DIASTOLIC BLOOD PRESSURE: 81 MMHG | SYSTOLIC BLOOD PRESSURE: 140 MMHG | HEART RATE: 107 BPM | HEIGHT: 66 IN | WEIGHT: 124 LBS | BODY MASS INDEX: 19.93 KG/M2

## 2022-10-07 VITALS — RESPIRATION RATE: 18 BRPM | TEMPERATURE: 98.6 F | HEART RATE: 68 BPM

## 2022-10-07 DIAGNOSIS — E78.49 OTHER HYPERLIPIDEMIA: Primary | ICD-10-CM

## 2022-10-07 DIAGNOSIS — S91.301D OPEN WOUND OF RIGHT FOOT, SUBSEQUENT ENCOUNTER: Primary | ICD-10-CM

## 2022-10-07 DIAGNOSIS — I27.20 PULMONARY HYPERTENSION (CMS/HCC): ICD-10-CM

## 2022-10-07 DIAGNOSIS — D84.9 IMMUNOCOMPROMISED STATE (CMS/HCC): ICD-10-CM

## 2022-10-07 DIAGNOSIS — I73.00 RAYNAUD'S DISEASE WITHOUT GANGRENE: ICD-10-CM

## 2022-10-07 DIAGNOSIS — E88.09 HYPOALBUMINEMIA: ICD-10-CM

## 2022-10-07 PROCEDURE — 3008F BODY MASS INDEX DOCD: CPT | Performed by: INTERNAL MEDICINE

## 2022-10-07 PROCEDURE — 93000 ELECTROCARDIOGRAM COMPLETE: CPT | Performed by: INTERNAL MEDICINE

## 2022-10-07 PROCEDURE — G0463 HOSPITAL OUTPT CLINIC VISIT: HCPCS

## 2022-10-07 PROCEDURE — 99215 OFFICE O/P EST HI 40 MIN: CPT | Performed by: INTERNAL MEDICINE

## 2022-10-07 PROCEDURE — 99213 OFFICE O/P EST LOW 20 MIN: CPT | Performed by: SURGERY

## 2022-10-07 RX ORDER — BALSAM PERU/CASTOR OIL
OINTMENT (GRAM) TOPICAL 2 TIMES DAILY
Qty: 60 G | Refills: 5 | Status: ON HOLD | OUTPATIENT
Start: 2022-10-07 | End: 2023-04-14 | Stop reason: ALTCHOICE

## 2022-10-07 RX ORDER — MECLIZINE HYDROCHLORIDE 25 MG/1
25 TABLET ORAL DAILY PRN
COMMUNITY

## 2022-10-07 ASSESSMENT — ENCOUNTER SYMPTOMS
FEVER: 0
APPETITE CHANGE: 0
WEAKNESS: 0
FLANK PAIN: 0
FATIGUE: 1
NUMBNESS: 0
NERVOUS/ANXIOUS: 0
CONFUSION: 0
EYE REDNESS: 0
EYE DISCHARGE: 0
POLYDIPSIA: 0
SHORTNESS OF BREATH: 0
WOUND: 1
ABDOMINAL DISTENTION: 0
JOINT SWELLING: 0
HEMATURIA: 0
COUGH: 0
RHINORRHEA: 0
FREQUENCY: 0
COLOR CHANGE: 0
BRUISES/BLEEDS EASILY: 0

## 2022-10-07 NOTE — PATIENT INSTRUCTIONS
Plan for Right heart catheterization - obtain labs (ordered) prior to procedure.   Follow up in 3 months  Call with any questions or issues: [phone]

## 2022-10-07 NOTE — PROGRESS NOTES
Patient ID: Arielle Felix                            : 1960  MRN: [id number]                                            Visit Date: 10/7/2022  Encounter Provider: ZENOBIA Mensah  Referring Provider: No ref. provider found    Subjective      HPI  Arielle GUAJARDO is a 61 y.o. old female with a chief compliant of No chief complaint on file.   presenting today for evaluation and management of chronic wound of the lower extremity.  Patient has been mixing equal portions of clobetasol ointment with gentamicin ointment as a substitute for Xenaderm or tacrolimus.  Wounds are extremely painful.    The following have been reviewed and updated as appropriate in this visit:   Tobacco  Allergies  Meds  Problems  Med Hx  Surg Hx  Fam Hx         Past Medical History:  has a past medical history of De Quervain's tenosynovitis, Essential (primary) hypertension, Follicular carcinoma of thyroid (CMS/HCC), Gastro-esophageal reflux, Hand ulceration (CMS/HCC), Hyperlipidemia, unspecified, Interstitial lung disease (CMS/HCC), Leg ulcer (CMS/HCC), Lung nodule, Lupus (CMS/HCC), Osteomyelitis of hand (CMS/HCC), Osteoporosis, Pulmonary hypertension (CMS/HCC), Raynaud's disease, Reflux esophagitis, and Scleroderma (CMS/HCC).  Past Surgical History:  has a past surgical history that includes Hernia repair and Cardiac catheterization.  Social History:   Social History     Tobacco Use    Smoking status: Former Smoker     Quit date: 9/15/2005     Years since quittin.0    Smokeless tobacco: Never Used    Tobacco comment: Would smoke 1/2 of 1/2 PPD, quit in    Substance Use Topics    Alcohol use: Never    Drug use: Never     Family History: family history includes Heart disease in her biological brother.  Medications:   Current Outpatient Medications:     balsam peru-castor oiL (VENELEX) ointment, Apply topically 2 (two) times a day., Disp: 60 g, Rfl: 5    amLODIPine (NORVASC) 5 mg tablet, Take 10 mg by mouth  daily. Two 5 mg tablets in the AM and 2 tablet (5 mg) at night PRN., Disp: , Rfl:     biotin 1 mg tablet, Take 1,000 mcg by mouth., Disp: , Rfl:     cholecalciferol, vitamin D3, 1,000 unit (25 mcg) tablet, Take 1,000 Units by mouth daily., Disp: , Rfl:     clobetasoL (TEMOVATE) 0.05 % ointment, Apply topically 2 (two) times a day for 15 days., Disp: 45 g, Rfl: 3    cyanocobalamin, vitamin B-12, 1,000 mcg capsule, Take 1,000 mcg by mouth daily., Disp: , Rfl:     docusate sodium (COLACE) 100 mg capsule, Take 100 mg by mouth daily., Disp: , Rfl:     gentamicin (GARAMYCIN) 0.1 % ointment, Apply topically daily., Disp: 60 g, Rfl: 1    levothyroxine (SYNTHROID) 100 mcg tablet, Take 100 mcg by mouth daily., Disp: , Rfl:     pantoprazole (PROTONIX) 40 mg EC tablet, Take 40 mg by mouth daily., Disp: , Rfl:     sodium chloride (SALINE WOUND WASH) 0.9 % solution, 1 application daily. Use to cleanse your wounds daily, Disp: 210 mL, Rfl: 0    Allergies: is allergic to aspirin, ibuprofen, iodine, iodine and iodide containing products, penicillins, sulfa (sulfonamide antibiotics), clindamycin, hydrochlorothiazide, oxycodone, sulfamethoxazole-trimethoprim, and latex.     Review of Systems   Constitutional: Positive for fatigue. Negative for appetite change and fever.   HENT: Negative for congestion and rhinorrhea.    Eyes: Negative for discharge and redness.   Respiratory: Negative for cough and shortness of breath.    Cardiovascular: Negative for leg swelling.   Gastrointestinal: Negative for abdominal distention.   Endocrine: Negative for cold intolerance, heat intolerance and polydipsia.   Genitourinary: Negative for flank pain, frequency and hematuria.   Musculoskeletal: Negative for gait problem and joint swelling.   Skin: Positive for wound. Negative for color change.   Allergic/Immunologic: Negative for immunocompromised state.   Neurological: Negative for weakness and numbness.   Hematological: Does not  bruise/bleed easily.   Psychiatric/Behavioral: Negative for confusion. The patient is not nervous/anxious.      Glucose   Date Value Ref Range Status   06/27/2022 86 70 - 99 mg/dL Final     Hemoglobin   Date Value Ref Range Status   06/27/2022 11.2 (L) 11.8 - 15.7 g/dL Final     Albumin   Date Value Ref Range Status   06/27/2022 2.8 (L) 3.4 - 5.0 g/dL Final     Creatinine   Date Value Ref Range Status   06/27/2022 1.0 0.6 - 1.1 mg/dL Final   ]    Objective   Visit Vitals  Pulse 68   Temp 37 °C (98.6 °F)   Resp 18       Physical Exam  Vitals and nursing note reviewed.   Constitutional:       General: She is not in acute distress.     Appearance: Normal appearance.   HENT:      Head: Normocephalic and atraumatic.   Eyes:      Conjunctiva/sclera: Conjunctivae normal.      Pupils: Pupils are equal, round, and reactive to light.   Neck:      Trachea: Trachea normal.   Cardiovascular:      Rate and Rhythm: Regular rhythm.   Pulmonary:      Effort: Pulmonary effort is normal. No respiratory distress.      Breath sounds: No wheezing.   Abdominal:      Palpations: Abdomen is soft.      Tenderness: There is no guarding.   Musculoskeletal:         General: No deformity.      Cervical back: Normal range of motion. No edema or erythema.        Legs:    Skin:     General: Skin is warm.      Capillary Refill: Capillary refill takes less than 2 seconds.   Neurological:      Mental Status: She is alert and oriented to person, place, and time. Mental status is at baseline.   Psychiatric:         Attention and Perception: She is attentive.         Mood and Affect: Affect is not inappropriate.         Speech: Speech normal.         Behavior: Behavior normal. Behavior is cooperative.       Scattered ulcerations medial right ankle                             medial left ankle      Assessment/Plan    Diagnosis Plan   1. Open wound of right foot, subsequent encounter     2. Hypoalbuminemia     3. Immunocompromised state (CMS/HCC)        Problem List Items Addressed This Visit     Immunocompromised state (CMS/HCC)     Patient has scleroderma and lupus.  She is not currently on any immunosuppressive medications    Patient known to have an autoimmune condition being treated with an immune system compromising medication, and it is recognized these factors will have a deleterious effect on wound healing speed and potential.  Additionally, with any open wound patient might be at increased risk for soft tissue or systemic infection.           Hypoalbuminemia     Serum albumin level 2.8 on 6/7/2022    PLAN:  The patient, and/or any family member in attendance, is reminded that adequate and sufficient nutrition is essential to support good wound healing and the immune system.  In addition to a proper and balanced diet, I suggest the patient consume a minimum of two cans of Ensure or Boost daily.  In those patients with hepatic or renal dysfunction, they are directed to follow the advise of their gastroenterologist or nephrologist.           Open wound of right foot - Primary     Plan:  Patient will continue compounding equal portions of clobetasol with gentamicin ointment and apply to symptomatic areas of her hands and feet.  Application can be up to 3 times daily.    I gave the patient some samples of Venelex to try.  Suggest applying twice daily and try to keep dressings off if possible.  If Venelex seems to be helpful we can see if the drug rep in the area can get more samples.  If the Venelex is of no benefit, then she can go back to applying clobetasol and gentamicin ointment simultaneously.             This document was generated using speech recognition software. Please excuse any typographical errors.    Return to wound center as needed     ZENOBIA Mensah MD

## 2022-11-14 ENCOUNTER — TELEPHONE (OUTPATIENT)
Dept: CARDIOLOGY | Facility: HOSPITAL | Age: 62
End: 2022-11-14
Payer: COMMERCIAL

## 2022-11-14 DIAGNOSIS — Z01.810 PRE-PROCEDURAL CARDIOVASCULAR EXAMINATION: Primary | ICD-10-CM

## 2022-11-25 ENCOUNTER — APPOINTMENT (OUTPATIENT)
Dept: LAB | Facility: HOSPITAL | Age: 62
End: 2022-11-25
Attending: PHYSICIAN ASSISTANT
Payer: COMMERCIAL

## 2022-11-25 DIAGNOSIS — I27.20 PULMONARY HYPERTENSION (CMS/HCC): ICD-10-CM

## 2022-11-25 DIAGNOSIS — Z01.810 PRE-PROCEDURAL CARDIOVASCULAR EXAMINATION: ICD-10-CM

## 2022-11-25 LAB
ANION GAP SERPL CALC-SCNC: 11 MEQ/L (ref 3–15)
BUN SERPL-MCNC: 17 MG/DL (ref 8–20)
CALCIUM SERPL-MCNC: 9.7 MG/DL (ref 8.9–10.3)
CHLORIDE SERPL-SCNC: 107 MEQ/L (ref 98–109)
CO2 SERPL-SCNC: 21 MEQ/L (ref 22–32)
CREAT SERPL-MCNC: 1 MG/DL (ref 0.6–1.1)
ERYTHROCYTE [DISTWIDTH] IN BLOOD BY AUTOMATED COUNT: 15.8 % (ref 11.7–14.4)
GFR SERPL CREATININE-BSD FRML MDRD: >60 ML/MIN/1.73M*2
GLUCOSE SERPL-MCNC: 92 MG/DL (ref 70–99)
HCT VFR BLDCO AUTO: 35.9 % (ref 35–45)
HGB BLD-MCNC: 11.2 G/DL (ref 11.8–15.7)
MCH RBC QN AUTO: 27.4 PG (ref 28–33.2)
MCHC RBC AUTO-ENTMCNC: 31.2 G/DL (ref 32.2–35.5)
MCV RBC AUTO: 87.8 FL (ref 83–98)
PDW BLD AUTO: 11.4 FL (ref 9.4–12.3)
PLATELET # BLD AUTO: 252 K/UL (ref 150–369)
POTASSIUM SERPL-SCNC: 4.3 MEQ/L (ref 3.6–5.1)
RBC # BLD AUTO: 4.09 M/UL (ref 3.93–5.22)
SARS-COV-2 RNA RESP QL NAA+PROBE: NEGATIVE
SODIUM SERPL-SCNC: 139 MEQ/L (ref 136–144)
WBC # BLD AUTO: 8.56 K/UL (ref 3.8–10.5)

## 2022-11-25 PROCEDURE — 85027 COMPLETE CBC AUTOMATED: CPT

## 2022-11-25 PROCEDURE — U0003 INFECTIOUS AGENT DETECTION BY NUCLEIC ACID (DNA OR RNA); SEVERE ACUTE RESPIRATORY SYNDROME CORONAVIRUS 2 (SARS-COV-2) (CORONAVIRUS DISEASE [COVID-19]), AMPLIFIED PROBE TECHNIQUE, MAKING USE OF HIGH THROUGHPUT TECHNOLOGIES AS DESCRIBED BY CMS-2020-01-R: HCPCS

## 2022-11-25 PROCEDURE — 80048 BASIC METABOLIC PNL TOTAL CA: CPT

## 2022-11-25 PROCEDURE — 36415 COLL VENOUS BLD VENIPUNCTURE: CPT

## 2022-11-25 PROCEDURE — C9803 HOPD COVID-19 SPEC COLLECT: HCPCS

## 2022-11-28 ENCOUNTER — TELEPHONE (OUTPATIENT)
Dept: CARDIOLOGY | Facility: CLINIC | Age: 62
End: 2022-11-28
Payer: COMMERCIAL

## 2022-11-28 ENCOUNTER — HOSPITAL ENCOUNTER (OUTPATIENT)
Facility: HOSPITAL | Age: 62
Setting detail: HOSPITAL OUTPATIENT SURGERY
Discharge: HOME | End: 2022-11-28
Attending: INTERNAL MEDICINE | Admitting: INTERNAL MEDICINE
Payer: COMMERCIAL

## 2022-11-28 VITALS
DIASTOLIC BLOOD PRESSURE: 70 MMHG | HEIGHT: 66 IN | SYSTOLIC BLOOD PRESSURE: 149 MMHG | RESPIRATION RATE: 23 BRPM | HEART RATE: 96 BPM | BODY MASS INDEX: 19.61 KG/M2 | WEIGHT: 122 LBS | OXYGEN SATURATION: 96 %

## 2022-11-28 DIAGNOSIS — I27.20 PULMONARY HYPERTENSION (CMS/HCC): ICD-10-CM

## 2022-11-28 PROBLEM — J84.9 INTERSTITIAL LUNG DISEASE (CMS/HCC): Status: ACTIVE | Noted: 2022-11-28

## 2022-11-28 LAB
ATRIAL RATE: 102
P AXIS: 51
POCT OXYHGB: 68 % (ref 93–98)
POCT TEST: ABNORMAL
PR INTERVAL: 168
QRS DURATION: 78
QT INTERVAL: 332
QTC CALCULATION(BAZETT): 432
R AXIS: 31
T WAVE AXIS: 91
VENTRICULAR RATE: 102

## 2022-11-28 PROCEDURE — 93010 ELECTROCARDIOGRAM REPORT: CPT | Performed by: INTERNAL MEDICINE

## 2022-11-28 PROCEDURE — 4A023N6 MEASUREMENT OF CARDIAC SAMPLING AND PRESSURE, RIGHT HEART, PERCUTANEOUS APPROACH: ICD-10-PCS | Performed by: INTERNAL MEDICINE

## 2022-11-28 PROCEDURE — 93451 RIGHT HEART CATH: CPT | Performed by: INTERNAL MEDICINE

## 2022-11-28 PROCEDURE — 93005 ELECTROCARDIOGRAM TRACING: CPT | Performed by: INTERNAL MEDICINE

## 2022-11-28 PROCEDURE — C1769 GUIDE WIRE: HCPCS | Performed by: INTERNAL MEDICINE

## 2022-11-28 PROCEDURE — 93451 RIGHT HEART CATH: CPT | Mod: 26 | Performed by: INTERNAL MEDICINE

## 2022-11-28 PROCEDURE — 71000011 HC PACU PHASE 1 EA ADDL MIN: Performed by: INTERNAL MEDICINE

## 2022-11-28 PROCEDURE — 71000001 HC PACU PHASE 1 INITIAL 30MIN: Performed by: INTERNAL MEDICINE

## 2022-11-28 PROCEDURE — C1894 INTRO/SHEATH, NON-LASER: HCPCS | Performed by: INTERNAL MEDICINE

## 2022-11-28 PROCEDURE — 25000000 HC PHARMACY GENERAL: Performed by: INTERNAL MEDICINE

## 2022-11-28 PROCEDURE — C1751 CATH, INF, PER/CENT/MIDLINE: HCPCS | Performed by: INTERNAL MEDICINE

## 2022-11-28 RX ORDER — SILDENAFIL CITRATE 20 MG/1
20 TABLET ORAL 3 TIMES DAILY
Qty: 90 TABLET | Refills: 0 | Status: SHIPPED | OUTPATIENT
Start: 2022-11-28 | End: 2022-12-22 | Stop reason: SDUPTHER

## 2022-11-28 RX ORDER — LIDOCAINE HYDROCHLORIDE 10 MG/ML
INJECTION, SOLUTION INFILTRATION; PERINEURAL
Status: DISCONTINUED | OUTPATIENT
Start: 2022-11-28 | End: 2022-11-28 | Stop reason: HOSPADM

## 2022-11-28 NOTE — H&P
Cardiology History and Physical     Admitting Diagnosis   Pulmonary hypertension (CMS/HCC) [I27.20]   HPI    Arielle Felix is a 62 y.o. female with PMH of Pulmonary HTN, HTN, Raynaud's Disease, Cutaneous Systemic Scleroderma (dx 1998), ILDwho presents to [facility] for cardiac catheterization.   On 6/27/2022, she was in [facility] ER for chest pain. EKG: NSR without ischemic changes. hsTrop: 12->16, d-dimer: 0.56, CXR showed cardiomegaly and diffuse interstitial prominence. She was discharged home and advised to follow up with cardiology. Pt follows with Dr. Nguyễn who ordered echocardiogram, which was completed on 8/22/2022 and showed estimated RVSP = 50-55 mmHg.     Patient admits to shortness of breath with minimal exertion and sometimes at rest. Walking across her bedroom to her bathroom causes her to be short of breath. She continues to have episodes of chest pain associated with exertion. She gets edema in her legs.       Medical History   Medical History:   Past Medical History:   Diagnosis Date   • De Quervain's tenosynovitis    • Essential (primary) hypertension    • Follicular carcinoma of thyroid (CMS/HCC)    • Gastro-esophageal reflux    • Hand ulceration (CMS/HCC)    • Hyperlipidemia, unspecified    • Interstitial lung disease (CMS/HCC)    • Leg ulcer (CMS/HCC)    • Lung nodule    • Lupus (CMS/HCC)    • Osteomyelitis of hand (CMS/HCC)    • Osteoporosis    • Pulmonary hypertension (CMS/HCC)    • Raynaud's disease     Severe   • Reflux esophagitis    • Scleroderma (CMS/HCC)        Surgical History:   Past Surgical History:   Procedure Laterality Date   • CARDIAC CATHETERIZATION     • HERNIA REPAIR         Allergies: Aspirin, Ibuprofen, Iodine, Iodine and iodide containing products, Penicillins, Sulfa (sulfonamide antibiotics), Clindamycin, Hydrochlorothiazide, Oxycodone, Sulfamethoxazole-trimethoprim, and Latex    No current facility-administered medications for this encounter.       Social History:    Social History     Socioeconomic History   • Marital status:    Tobacco Use   • Smoking status: Former     Types: Cigarettes     Quit date: 9/15/2005     Years since quittin.2   • Smokeless tobacco: Never   • Tobacco comments:     Would smoke 1/2 of 1/2 PPD, quit in    Substance and Sexual Activity   • Alcohol use: Never   • Drug use: Never     Social Determinants of Health     Food Insecurity: No Food Insecurity   • Worried About Running Out of Food in the Last Year: Never true   • Ran Out of Food in the Last Year: Never true       Family History:   Family History   Problem Relation Age of Onset   • Heart disease Biological Brother         stent         Review of Systems   All other systems reviewed and negative except as noted in the HPI.   Objective    Vital Signs for the last 24 hours           Physical Exam   There were no vitals taken for this visit.    General Appearance:  Alert, no distress   Head:  Normocephalic, without obvious abnormality, atraumatic   Eyes:  Conjunctiva/corneas clear, EOM's intact   Endocrine: No thyroid enlargement    Lungs:   Clear to auscultation bilaterally, respirations unlabored, no rales, no wheezing   Heart:  Regular rhythm, S1 and S2 normal, no murmur, rub or gallop   Abdomen:   Soft, non-tender, no masses, no organomegaly   Vascular: Pulses 2+ and symmetric all extremities, no carotid bruit or jugular vein distention   Musculoskeletal:  Skin: No injury or deformity  Skin color, texture, turgor normal, no rashes or lesions, no cyanosis or edema   Extremities: Extremities normal, atraumatic   Behavior/Emotional: Appropriate, cooperative         Labs   Lab Results   Component Value Date    WBC 8.56 2022    HGB 11.2 (L) 2022    HCT 35.9 2022     2022    ALT 13 2022    AST 30 2022     2022    K 4.3 2022     2022    CREATININE 1.0 2022    BUN 17 2022    CO2 21 (L) 2022      Troponin I Results       06/27/22 06/27/22     1753 1557    HS Troponin I 16 12            Imaging   I have independently reviewed the pertinent imaging from the last 24 hrs.     Cardiac Imaging    TRANSTHORACIC ECHO (TTE) COMPLETE 08/22/2022    Interpretation Summary  · Normal-sized LV. Normal LV systolic function. Estimated EF 55- 60%. Wall motion appears grossly normal. Mild concentric left ventricular hypertrophy. Grade I LV diastolic dysfunction.  · Pulmonic valve not well visualized. Grossly normal pulmonic valve structure. Trace pulmonic valve regurgitation. No pulmonic valve stenosis.  · Aortic valve not well visualized. Aortic valve not well seen but likely trileaflet. Focal aortic valve calcification present. Sclerotic aortic valve leaflets. Mild aortic valve regurgitation. No aortic valve stenosis.  · RV not well visualized. Normal-sized RV. Normal RV systolic function.  · Normal-sized RA.  · There is a small loculated pericardial effusion anterior to the right atrium. No cardiac tamponade.  · Sclerotic mitral valve. Mitral valve calcification of the anterior leaflet present.  · Trace mitral valve regurgitation.  · No significant mitral valve stenosis.  · Normal-sized IVC. IVC demonstrates normal respiratory collapse.  · Tricuspid valve structure is grossly normal. Mild to moderate tricuspid valve regurgitation.  · Estimated RVSP = 50-55 mmHg.  · Mildly dilated LA.  · No previous echocardiogram available for comparison.         ECG     sinus rhythm   Telemetry   sinus rhythm      Assessment/Plan      Pulmonary hypertension (CMS/HCC)  1. Pulmonary Hypertension (WHO Group I, NYHA/WHO FC II/III)  Occurring in the background of Systemic Scleroderma with ILD.   August 2022 TTE showed normal RV size and function, but progressive exertional decline, worsened DLCO and resting tachycardia  Concern for progression of her pulmonary vascular disease and limitation of cardiac output.   Plan to repeat a right heart  catheterization today for an updated characterization of her PH as well as learn candidacy for pulmonary vasodilators.     Essential (primary) hypertension  She is continued on Amlodipine.    Raynaud's disease  Hx Systemic Scleroderma / Raynaud's disease per Rheumatology (Dr. Giraldo)    Interstitial lung disease (CMS/HCC)  Per Pulmonology (Dr. Flesch) and Rheumatology (Dr. Giraldo).       KARYNA Warren  11/28/2022  11:48 AM

## 2022-11-28 NOTE — ASSESSMENT & PLAN NOTE
1. Pulmonary Hypertension (WHO Group I, NYHA/WHO FC II/III)  Occurring in the background of Systemic Scleroderma with ILD.   August 2022 TTE showed normal RV size and function, but progressive exertional decline, worsened DLCO and resting tachycardia  Concern for progression of her pulmonary vascular disease and limitation of cardiac output.   Plan to repeat a right heart catheterization today for an updated characterization of her PH as well as learn candidacy for pulmonary vasodilators.

## 2022-11-28 NOTE — Clinical Note
Patient placed on procedure table in supine position with right arm extended. Positioning devices: arm board under arms.

## 2022-11-28 NOTE — TELEPHONE ENCOUNTER
Wilman Moon -     Can you please see if any prior authorization or cost assistance is needed for Sildenafil 20 mg TID? RHC from today. Group I PH.     Thanks,.   Timothy

## 2022-11-28 NOTE — NURSING NOTE
Patient c/o chest tightness .  Vital signs remains stable.  Dr. Arvizu at bed side to assess.  EKG performed.  EKG given to Dr. Arvizu, no further action taken at present time. Will continue to assess and monitor patient.

## 2022-11-28 NOTE — NURSING NOTE
Patient denies chest pain or SOB.  Vital signs stable.  Patient okay for discharge per Dr. Farley and Lakeisha TRONCOSO.

## 2022-11-28 NOTE — Clinical Note
Closure device placed for the right brachial vein. Closure pressure manually applied. Hemostasis achieved.

## 2022-11-28 NOTE — DISCHARGE INSTRUCTIONS
Post cardiac catheterization instructions:  On day of discharge, limit activities.  Remove dressing next day. Keep site clean and dry.  May shower next day of procedure. Do not submerge catherization site in pool or tub baths for 5 days  Call if  swelling, bleeding, fever or drainage from catheterization site        Medications: Take medications as directed.   New medication - Sildenafil 20 mg three times a day.     Follow up with Dr. Arvizu, call  with questions or concerns.

## 2022-11-28 NOTE — NURSING NOTE
Patient c/o chest pressure rates a 7 from 0-10 scale.  VSS.  Made KARYNA Patino aware. At bedside to assess patient and will inform Dr. Arvizu.

## 2022-11-28 NOTE — POST-PROCEDURE NOTE
Same Day Discharge        Discharge instructions given with the emphasis on importance of follow-up visit with physician, evaluation of access site until healed and recognition of signs and symptoms of ACS.         Patient verbalized understanding of all d/c instructions.  Denies chest pain or SOB at present time. VSS.  Patient's daughter to escort patient home via private vehicle.

## 2022-11-28 NOTE — PRE-PROCEDURE NOTE
Cardiac Cath Lab Pre-procedure Note    - Patient was seen and examined at bedside.  - The patient's chart and all data was reviewed.  - The procedure, treatment alternatives, risks and benefits were explained with specific risks discussed.  - Patient was consented for cardiac cath procedure and possible PCI.        Patient is at risk for stroke due to extensive atherosclerotic vascular disease; acute myocardial infarction; vascular complications due to the presence of severe peripheral arterial disease in the iliac/femoral vessels increasing the risk of hematoma, retroperitoneal bleeding, pseudo-aneurysms and arteriovenous fistula formation and bleeding.                         Planned for right heart catheterization.

## 2022-11-28 NOTE — Clinical Note
The right brachial was clipped, marked and prepped with ChloraPrep. The patient was draped in a sterile fashion after allowing for the recommended dry time.

## 2022-11-28 NOTE — POST-PROCEDURE NOTE
Cardiovascular Post Procedure Note:    Arielle Felix  11/28/2022    Pre-op Diagnosis     * Pulmonary hypertension (CMS/HCC) [I27.20]   Post-op Diagnosis     * Pulmonary hypertension (CMS/HCC) [I27.20]     Procedure(s):  RIGHT HEART CATH   Surgeon(s):  Timothy Arvizu DO Sturzoiu, Tudor, MD    Findings:  · Normal right sided filling pressures  · Mildly elevated left sided filling pressures  · Precapillary pulmonary hypertension with mean PA 36 mmHg     Anesthesia:  Local    Staff:   Circulator: Jos Ramírez RN; Rosy Fung RN  Documenter: Nikolay Farmer RN     Estimated Blood Loss:   10 cc    Specimens:   No specimens collected during this procedure.     Implants:   Nothing was implanted during the procedure     Complications:  None    Date: 11/28/2022 Time: 2:29 PM

## 2022-11-29 NOTE — TELEPHONE ENCOUNTER
Sildenafil Prior authorization started-- sent to plan along with supporting documentation.    aseId:[id number];Status:Approved;Review Type:Prior Auth;Coverage Start Date:10/30/2022;Coverage End Date:11/29/2023;

## 2022-12-22 ENCOUNTER — TELEPHONE (OUTPATIENT)
Dept: SCHEDULING | Facility: CLINIC | Age: 62
End: 2022-12-22
Payer: COMMERCIAL

## 2022-12-22 DIAGNOSIS — I27.20 PULMONARY HYPERTENSION (CMS/HCC): Primary | ICD-10-CM

## 2022-12-22 RX ORDER — SILDENAFIL CITRATE 20 MG/1
20 TABLET ORAL 3 TIMES DAILY
Qty: 90 TABLET | Refills: 11 | Status: SHIPPED | OUTPATIENT
Start: 2022-12-22 | End: 2023-04-06 | Stop reason: SDUPTHER

## 2022-12-22 NOTE — TELEPHONE ENCOUNTER
"I called and s/w the pt. She reports ever since starting the Sildenafil 20 mg TID after her RHC at the end of November she has had diarrhea. She reports it is yellow/orange in color and foul smelling. She denies any recent abx use. She reports she feels well otherwise but only has formed stools on occasion now and mostly has liquid or very soft stools, to the point she has to protect her clothing as it \"just comes out\" sometimes. She denies any increase in dizziness (she has vertigo).     BLAS/SM: Pls advise. SETH Hopper: Pls assist the pt to schedule a f/u visit w/ Dr. Arvizu. TY  "

## 2022-12-22 NOTE — TELEPHONE ENCOUNTER
SETH Hopper.     I discussed w/ Dr. Arvizu and he would like me to send the expected SE of pulmonary HTN meds to the pt. I called and s/w the pt and she reports she doesn't have an email so I read her the below information of things she can try. She wrote them down. I also notified Dr. Arvizu reports she can try Immodium for bad episodes. She reports she did try the Immodium one time and it helped for that day but then the diarrhea occurred again later.       I also requested she get blood work done next week per Dr. Arvizu. BMP and Mg added to ML labs.

## 2022-12-22 NOTE — TELEPHONE ENCOUNTER
Medicine Refill Request  Pt called requests a refill of sildenafiL, pulm.hypertension, (REVATIO) 20 mg 90#, pt has enough for another week         Last Office: 10/7/2022   Last Consult Visit: Visit date not found  Last Telemedicine Visit: Visit date not found    Next Appointment: Visit date not found      Current Outpatient Medications:     amLODIPine (NORVASC) 5 mg tablet, Take 10 mg by mouth daily. Two 5 mg tablets in the AM and 2 tablet (5 mg) at night PRN., Disp: , Rfl:     balsam peru-castor oiL (VENELEX) ointment, Apply topically 2 (two) times a day., Disp: 60 g, Rfl: 5    biotin 1 mg tablet, Take 1,000 mcg by mouth., Disp: , Rfl:     cholecalciferol, vitamin D3, 1,000 unit (25 mcg) tablet, Take 1,000 Units by mouth daily., Disp: , Rfl:     clobetasoL (TEMOVATE) 0.05 % ointment, Apply topically 2 (two) times a day for 15 days., Disp: 45 g, Rfl: 3    cyanocobalamin, vitamin B-12, 1,000 mcg capsule, Take 1,000 mcg by mouth daily., Disp: , Rfl:     docusate sodium (COLACE) 100 mg capsule, Take 100 mg by mouth daily., Disp: , Rfl:     gentamicin (GARAMYCIN) 0.1 % ointment, Apply topically daily., Disp: 60 g, Rfl: 1    levothyroxine (SYNTHROID) 100 mcg tablet, Take 100 mcg by mouth daily., Disp: , Rfl:     meclizine HCl (MECLIZINE ORAL), Take by mouth., Disp: , Rfl:     pantoprazole (PROTONIX) 40 mg EC tablet, Take 40 mg by mouth daily., Disp: , Rfl:     sildenafiL, pulm.hypertension, (REVATIO) 20 mg tablet, Take 1 tablet (20 mg total) by mouth 3 (three) times a day., Disp: 90 tablet, Rfl: 0    sodium chloride (SALINE WOUND WASH) 0.9 % solution, 1 application daily. Use to cleanse your wounds daily, Disp: 210 mL, Rfl: 0      BP Readings from Last 3 Encounters:   11/28/22 (!) 149/70   10/07/22 140/81   08/22/22 138/84       Recent Lab results:  No results found for: CHOL, No results found for: HDL, No results found for: LDLCALC, No results found for: TRIG     Lab Results   Component Value Date     GLUCOSE 92 11/25/2022   , No results found for: HGBA1C      Lab Results   Component Value Date    CREATININE 1.0 11/25/2022       No results found for: TSH

## 2022-12-22 NOTE — TELEPHONE ENCOUNTER
Pt called states she is having diarrhea with taking sildenafiL pt requests a call, pt unsure if this normal. Pt is still taking the medication.     Pt requests a call back to schedule an OV.     Pt can be reached at [phone]

## 2022-12-29 ENCOUNTER — TELEPHONE (OUTPATIENT)
Dept: CARDIOLOGY | Facility: CLINIC | Age: 62
End: 2022-12-29
Payer: COMMERCIAL

## 2022-12-29 NOTE — TELEPHONE ENCOUNTER
Lab Order Slip Needed     Name of caller: Arielle      Relationship to patient: self       Name of patient: Braydencarlitos GUAJARDO Isidro    Name of physician: Timothy Arvizu,     Type of test: Labs for Magnesium and BMP    Indicate whether patient would like order slip mailed to them or faxed/electronically sent to facility: Faxed to [facility]     Name and fax number of facility: [facility] ([facility]) fax:[phone] Phn:[phone]    Best contact number: [phone]    Patient would like to get her labs done at this location. Please call when faxed.

## 2023-01-18 ENCOUNTER — OFFICE VISIT (OUTPATIENT)
Dept: OBSTETRICS AND GYNECOLOGY | Facility: CLINIC | Age: 63
End: 2023-01-18
Payer: COMMERCIAL

## 2023-01-18 VITALS
BODY MASS INDEX: 19.29 KG/M2 | WEIGHT: 120 LBS | SYSTOLIC BLOOD PRESSURE: 140 MMHG | DIASTOLIC BLOOD PRESSURE: 70 MMHG | HEIGHT: 66 IN

## 2023-01-18 DIAGNOSIS — Z12.4 PAP SMEAR FOR CERVICAL CANCER SCREENING: Primary | ICD-10-CM

## 2023-01-18 DIAGNOSIS — Z12.31 ENCOUNTER FOR SCREENING MAMMOGRAM FOR MALIGNANT NEOPLASM OF BREAST: ICD-10-CM

## 2023-01-18 DIAGNOSIS — Z11.3 SCREENING EXAMINATION FOR SEXUALLY TRANSMITTED DISEASE: ICD-10-CM

## 2023-01-18 DIAGNOSIS — Z01.419 ENCOUNTER FOR GYNECOLOGICAL EXAMINATION WITHOUT ABNORMAL FINDING: ICD-10-CM

## 2023-01-18 PROCEDURE — G0101 CA SCREEN;PELVIC/BREAST EXAM: HCPCS | Performed by: OBSTETRICS & GYNECOLOGY

## 2023-01-18 PROCEDURE — 3008F BODY MASS INDEX DOCD: CPT | Performed by: OBSTETRICS & GYNECOLOGY

## 2023-01-18 NOTE — PROGRESS NOTES
Visit Date: 01/18/2023  Arielle Felix is 62 y.o. female presenting today for Annual GYN Exam      HPI:  No LMP recorded (lmp unknown). Patient is postmenopausal.  Menstrual Cycle: Patient's last menstrual period was approximately 20 years ago. She is not experiencing any vaginal bleeding, spotting, or pink discharge.  Sexually Activity: Patient not sexually active.  Bowel and Bladder function: She feels like she can't hold her bowels. She sometimes feel like she has to pass gas and she has incontinence of orange or clear fluid. She is also having some urinary urgency/incotinence.   Pap Smears: does not have a history of abnormal pap smears, Last pap was unknown.   Patient is experiencing hot flashes.  Patient is experiencing night sweats.  Patient is not experiencing vaginal dryness.  Patient  does not report any breast issues.  Patient's Last Mammogram was in 5/21 and was  Bi-rads 1  Patient does have a family history of breast or gyn cancers.  Patient  does desire screening for STIs today.   Patient does not have a history of Domestic Violence/Verbal Abuse/Sexual Assault. She does feel safe in her current environment.     Past Medical History:  has a past medical history of De Quervain's tenosynovitis, Essential (primary) hypertension, Follicular carcinoma of thyroid (CMS/HCC), Gastro-esophageal reflux, Hand ulceration (CMS/HCC), Hyperlipidemia, unspecified, Interstitial lung disease (CMS/HCC), Leg ulcer (CMS/HCC), Lung nodule, Lupus (CMS/HCC), Osteomyelitis of hand (CMS/HCC), Osteoporosis, Pulmonary hypertension (CMS/HCC), Raynaud's disease, Reflux esophagitis, Scleroderma (CMS/HCC), and Screening mammogram for breast cancer (05/04/2021).    She has no past medical history of Abnormal Pap smear of cervix.    Past Surgical History:  has a past surgical history that includes Hernia repair and Cardiac catheterization.    Medications:   Current Outpatient Medications:   •  amLODIPine (NORVASC) 5 mg tablet,  Take 10 mg by mouth daily. Two 5 mg tablets in the AM and 2 tablet (5 mg) at night PRN., Disp: , Rfl:   •  balsam peru-castor oiL (VENELEX) ointment, Apply topically 2 (two) times a day., Disp: 60 g, Rfl: 5  •  biotin 1 mg tablet, Take 1,000 mcg by mouth., Disp: , Rfl:   •  cholecalciferol, vitamin D3, 1,000 unit (25 mcg) tablet, Take 1,000 Units by mouth daily., Disp: , Rfl:   •  cyanocobalamin, vitamin B-12, 1,000 mcg capsule, Take 1,000 mcg by mouth daily., Disp: , Rfl:   •  docusate sodium (COLACE) 100 mg capsule, Take 100 mg by mouth daily., Disp: , Rfl:   •  gentamicin (GARAMYCIN) 0.1 % ointment, Apply topically daily., Disp: 60 g, Rfl: 1  •  levothyroxine (SYNTHROID) 100 mcg tablet, Take 100 mcg by mouth daily., Disp: , Rfl:   •  meclizine HCl (MECLIZINE ORAL), Take by mouth., Disp: , Rfl:   •  pantoprazole (PROTONIX) 40 mg EC tablet, Take 40 mg by mouth daily., Disp: , Rfl:   •  sildenafiL, pulm.hypertension, (REVATIO) 20 mg tablet, Take 1 tablet (20 mg total) by mouth 3 (three) times a day., Disp: 90 tablet, Rfl: 11  •  sodium chloride (SALINE WOUND WASH) 0.9 % solution, 1 application daily. Use to cleanse your wounds daily, Disp: 210 mL, Rfl: 0  •  clobetasoL (TEMOVATE) 0.05 % ointment, Apply topically 2 (two) times a day for 15 days., Disp: 45 g, Rfl: 3      Allergies: is allergic to aspirin, ibuprofen, iodine, iodine and iodide containing products, penicillins, sulfa (sulfonamide antibiotics), clindamycin, hydrochlorothiazide, oxycodone, sulfamethoxazole-trimethoprim, and latex.     Family History: family history includes Breast cancer in her niece; Heart disease in her biological brother.    Social History:   Social History     Tobacco Use   • Smoking status: Former     Types: Cigarettes     Quit date: 9/15/2005     Years since quittin.3   • Smokeless tobacco: Never   • Tobacco comments:     Would smoke 1/2 of 1/2 PPD, quit in    Vaping Use   • Vaping Use: Never used   Substance Use Topics   •  "Alcohol use: Never   • Drug use: Never       Review of Systems  Constitutional:   No hot flashes.   No night sweats.   No unexpected weight changes.  HENT:   No oral ulcers.   No headaches related to menstrual cycle.   Breasts:   No changes to skin of the breast or nipple.   No nipple discharge.   No breast lumps/cysts.   No breast pain.   Patient has not noticed any changes to breasts.   Respiratory:   No shortness of breath.  Cardiovascular:   No chest pain  Gastrointestinal:   No changes in bowel habits.  Genitourinary:   No pain with urination.   No urgency with urination.   No increased frequency of urination.   No urinary incontinence.   No vaginal dryness.  No vaginal discharge.   No painful intercourse.   No genital sores.   No irregular menstrual cycles.   No heavy menstrual cycles.   No painful menstrual periods.   No bleeding between menstrual cycles.   No bleeding after intercourse.  Yes absence of menstrual periods.  Integument:   No changes to any existing genital skin lesions or moles.   No new vaginal skin lesions or moles.   No genital rashes.   Endocrine:   No increased hair growth   Psychiatric:   No anxiety.   No depression.   No suicidal ideation.  Patient does not have concerns for her safety.   Heme-Lymph:   No easy bruising.   No unexplained lumps.           Visit Vitals  /70 (BP Location: Right upper arm, Patient Position: Sitting)   Ht 1.676 m (5' 6\")   Wt 54.4 kg (120 lb)   LMP  (LMP Unknown)   BMI 19.37 kg/m²       OBGyn Exam  General Appearance: Alert, cooperative, no acute distress  Head: Normocephalic, without obvious abnormality  Breast: Right breast normal without mass, skin or nipple changes or axillary nodes. Left breast normal without mass, skin or nipple changes or axillary nodes.  Abdomen: Soft, nontender, nondistended,no masses, no organomegaly  Pelvic exam: VULVA: normal appearing vulva with no masses, tenderness or lesions. VAGINA: normal appearing vagina with normal " color and discharge, no lesions. CERVIX: normal appearing cervix without discharge or lesions. UTERUS: uterus is normal size, shape, consistency and non-tender. ADNEXA: normal adnexa in size, nontender and no masses.  Extremities: no edema    Office Labs/Tests:       Assessment and Plan:  62 y.o. yo presents for an annual exam.   1. Pap  with HPV collected today.  2. STI Testing: GC/CT collected. HIV/SA/Hep B lab slip provided.  3. Mammogram slip  provided today.  4. Dexa slip  not indicated today.   6. Return to office 1 year or prn  7. Patient to call for results in 7-10 days.     Terese Fitzgerald MD

## 2023-01-19 LAB
HIV 1+2 AB+HIV1 P24 AG SERPL QL IA: NON REACTIVE
TREPONEMA PALLIDUM IGG+IGM AB [PRESENCE] IN SERUM OR PLASMA BY IMMUNOASSAY: NON REACTIVE

## 2023-01-20 LAB
C TRACH RRNA SPEC QL NAA+PROBE: NEGATIVE
N GONORRHOEA RRNA SPEC QL NAA+PROBE: NEGATIVE
T VAGINALIS RRNA SPEC QL NAA+PROBE: NEGATIVE

## 2023-01-24 LAB
CYTOLOGIST CVX/VAG CYTO: NORMAL
CYTOLOGY CVX/VAG DOC CYTO: NORMAL
CYTOLOGY CVX/VAG DOC THIN PREP: NORMAL
DX ICD CODE: NORMAL
LAB CORP .: NORMAL
LAB CORP NOTE:: NORMAL
OTHER STN SPEC: NORMAL
STAT OF ADQ CVX/VAG CYTO-IMP: NORMAL

## 2023-04-04 NOTE — PROGRESS NOTES
I had the pleasure of seeing Arielle Felix (: 1960) in the [facility] Heart Failure and Pulmonary Hypertension clinic on 2023.     Ms. Felix is a 62 y.o. female with a past medical history of Pulmonary HTN, HTN, Raynaud's Disease, Cutaneous Systemic Scleroderma (dx ), ILD, PE (3/2023). She is a patient of Dr. Nguyễn. She was previously followed by Dr. Jos Valdez at \[facility]\"". She had stress test 2013 was negative for ischemia. Echocardiogram from 2022 showed LVEF: 55-60%, mild aortic valve thickening, pulmonary artery systolic pressure: 52 mmHg and trivial pericardial effusion. LHC and RHC (separate occasions) over the last 5-10 years which showed normal hemodynamics and normal coronaries. She had a RHC 2020 showing top-normal right sided filling pressures with mild pulmonary hypertension, top-normal PVR and normal pulmonary capillary wedge pressure. The cardiac index was normal, seen below.     She has previously followed up with Dr. Judd Flesch at \[facility]\"" Pulmonology. PFTs 3/18/2020. Last OV 3/23/22. CT chest and echo ordered. She reports that she will be initiating care with Dr. Pratibha Abbott. She was to follow up with Dr. Giraldo of Rheumatology.     On 2022, she was in [place] ER for chest pain. EKG: NSR without ischemic changes. hsTrop: 12->16, d-dimer: 0.56, CXR showed cardiomegaly and diffuse interstitial prominence. She was discharged home and advised to follow up with cardiology.     Dr. Nguyễn ordered echocardiogram, which was completed on 2022 and showed estimated RVSP = 50-55 mmHg, normal-sized LV with normal LV systolic function. Estimated EF 55- 60%. Wall motion grossly normal, mild concentric left ventricular hypertrophy, grade I LV diastolic dysfunction, probably normal RV size and function.    At her initial visit on 10/11/2022, she reported that she felt very short of breath with minimal activity most of the time. She reports that this  "started about 1 year prior and had progressively gotten worse. She described PND and bendopnea. She reported that she experienced ankle swelling, worsened over the past year and usually worse towards the end of the night. She also reported some swelling in her abdomen. She reported daily palpations.    At her initial visit I recommended a RHC which was done 11/28/2022 and showed: Normal right sided filling pressures  Mildly elevated left sided filling pressures. Precapillary pulmonary hypertension with mean PA 36 mmHg    She reports that she was hospitalized on 3/19/2023 at [facility] for PE diagnosed on V/Q scan. She reports that she had presented with left arm pain and heaviness. She states that she was started on Apixaban. She reports on-going fatigue, shortness of breath and chest pain, which are about the same as her baseline prior to her hospitalization. She notes that she coughs up blood with clots every, and this has been going on for about a year. She reports that the amount is \"up and down\". She states that it is largely unchanged since she started on Apixaban. She does note that last Friday was particularly bad. She reports on-going Vertigo, which improves with Meclizine. She denies light-headedness, syncope or near syncope. She reports that she fell and hit her head about 4-5 months ago (before she was on blood thinner). She reports a constant pressure at her left sternum. She reports that it is a pressure (\"not burning or sharp\"). She denies any radiation or change with exertion. She cannot identify any aggravating or relieving factors, but states that the sensation is constant. She reports intermittent shortness of breath with rest. She reports needing to take a break after 4-5 steps of exertion. She describes occasional swelling of her feet but not ankles or legs. She denies abdominal distension. She reports sleeping on 2-3 pillows at night. She reports that she can wake up short of " "breath and coughing at times.     She reports medication adherence. She reports that she has not been weighing herself at home.     Rheumatology: Enzo ([facility])  Pulmonology: Flesch -> Pratibha Abbott ([facility] - upcoming appointment)     Past Medical / Surgical History:  Pulmonary HTN, HTN, Raynaud's Disease, Cutaneous Systemic Scleroderma (dx 1998), ILD, PE (3/2023), Follicular Carcinoma of Thyroid, Tenosynovitis, de Quervain, h/o Hernia Repair, h/o Appendicitis, h/o Digit Amputation, H/o Total Thyroidectomy (Dr. Pacheco at [facility]; 4/2013)    Family & Social History: She is , she has two children. She works as an .     Tobacco: former 2005  EtOH: never   Illicits: never     Her brother has CAD with stent  1st cousin with SLE  Both parents had \"heart problems\"    Allergies:   Allergies   Allergen Reactions   • Aspirin Shortness of breath     Other reaction(s): Unknown  \"stop breathing\"     • Ibuprofen Shortness of breath     Stop breathing   • Iodine Shortness of breath     Other reaction(s): Unknown   • Iodine And Iodide Containing Products Shortness of breath     ? rash   • Penicillins Shortness of breath     Other reaction(s): Unknown   • Sulfa (Sulfonamide Antibiotics) Angioedema   • Clindamycin    • Hydrochlorothiazide      Other reaction(s): Abdominal Pain   Slow heart rate   • Oxycodone      Other reaction(s): Vomiting   • Sulfamethoxazole-Trimethoprim      Other reaction(s): Vomiting, Weakness    • Latex Rash       Medications:   Current Outpatient Medications   Medication Sig Dispense Refill   • amLODIPine (NORVASC) 5 mg tablet Take 10 mg by mouth daily. Two 5 mg tablets in the AM and 2 tablet (5 mg) at night PRN.     • balsam peru-castor oiL (VENELEX) ointment Apply topically 2 (two) times a day. 60 g 5   • biotin 1 mg tablet Take 1,000 mcg by mouth.     • cholecalciferol, vitamin D3, 1,000 unit (25 mcg) tablet Take 1,000 Units by mouth daily.     • cyanocobalamin, vitamin B-12, 1,000 " mcg capsule Take 1,000 mcg by mouth daily.     • docusate sodium (COLACE) 100 mg capsule Take 100 mg by mouth daily.     • gentamicin (GARAMYCIN) 0.1 % ointment Apply topically daily. 60 g 1   • levothyroxine (SYNTHROID) 100 mcg tablet Take 100 mcg by mouth daily.     • meclizine HCl (MECLIZINE ORAL) Take by mouth.     • sildenafiL, pulm.hypertension, (REVATIO) 20 mg tablet Take 1 tablet (20 mg total) by mouth 3 (three) times a day. 90 tablet 3   • sodium chloride (SALINE WOUND WASH) 0.9 % solution 1 application daily. Use to cleanse your wounds daily 210 mL 0   • clobetasoL (TEMOVATE) 0.05 % ointment Apply topically 2 (two) times a day for 15 days. 45 g 3   • pantoprazole (PROTONIX) 40 mg EC tablet Take 40 mg by mouth daily.       No current facility-administered medications for this visit.       Review of Systems:   All other systems reviewed and are negative except as per HPI.    Physical Exam:     Wt Readings from Last 10 Encounters:   04/06/23 52.6 kg (116 lb)   01/18/23 54.4 kg (120 lb)   11/28/22 55.3 kg (122 lb)   10/07/22 56.2 kg (124 lb)   08/22/22 57.2 kg (126 lb)   08/12/22 57.2 kg (126 lb)   06/27/22 57.6 kg (127 lb)   06/18/22 56.7 kg (125 lb)   11/27/20 63.5 kg (140 lb)       BP Readings from Last 5 Encounters:   04/06/23 130/74   01/18/23 140/70   11/28/22 (!) 149/70   10/07/22 140/81   08/22/22 138/84       Vitals:    04/06/23 1046   BP: 130/74   Pulse: (!) 117       Body mass index is 18.72 kg/m².  Body surface area is 1.57 meters squared.    Constitutional: NAD, AAOx3  HENT: Normocephalic, atraumatic head, sclerae anicteric, no cervical lymphadenopathy, trachea midline  Cardiovascular: RRR, no murmurs, rubs or gallops, JVP: 8 mmHg   cm H2O, no carotid bruits.  Respiratory: CTA bilateral lung fields, no wheezes, rales or rhonchi  GI: soft, non-tender/non-distended  Musculoskeletal / Extremities: no peripheral edema, +2 pulses bilateral radial, DP/PT arteries, skin tightening on face and  fingertips  Skin: no rashes  Neuro / Psych: no focal deficits, CNII-XII grossly intact, appropriate, cooperative    Diagnostic Data:     Component      Latest Ref Rng & Units 6/27/2022 6/27/2022           3:57 PM  5:53 PM   High Sens Troponin I      <15 pg/mL 12 16 (H)      Component      Latest Ref Rng & Units 6/27/2022   BNP      <=100 pg/mL 111 (H)      Component      Latest Ref Rng & Units 6/27/2022   WBC      3.80 - 10.50 K/uL 7.99   RBC      3.93 - 5.22 M/uL 4.05   Hemoglobin      11.8 - 15.7 g/dL 11.2 (L)   Hematocrit      35.0 - 45.0 % 35.8   MCV      83.0 - 98.0 fL 88.4   MCH      28.0 - 33.2 pg 27.7 (L)   MCHC      32.2 - 35.5 g/dL 31.3 (L)   RDW      11.7 - 14.4 % 15.6 (H)   Platelets      150 - 369 K/uL 243      Component      Latest Ref Rng & Units 6/27/2022   Sodium      136 - 144 mEQ/L 138   Potassium      3.6 - 5.1 mEQ/L 4.8   Chloride      98 - 109 mEQ/L 107   CO2      22 - 32 mEQ/L 23   BUN      8 - 20 mg/dL 14   Creatinine      0.6 - 1.1 mg/dL 1.0   Glucose      70 - 99 mg/dL 86   Calcium      8.9 - 10.3 mg/dL 9.1   AST (SGOT)      15 - 41 IU/L 30   ALT (SGPT)      11 - 54 IU/L 13   Alkaline Phosphatase      35 - 126 IU/L 71   Total Protein      6.0 - 8.2 g/dL 8.3 (H)   Albumin      3.4 - 5.0 g/dL 2.8 (L)   Bilirubin, Total      0.3 - 1.2 mg/dL 0.7   eGFR      >=60.0 mL/min/1.73m*2 >60.0   Anion Gap      3 - 15 mEQ/L 8                                          ECG (4/6/2023, personally reviewed): Sinus Tachycardia (HR: 107 bpm), ANA, old anterior infarct pattern, non-specific T-wave abnormality    TTE (11/28/2022, personally reviewed):   • Normal right- and mildly elevated left-sided filling pressures (RA: 3 mmHg, PCWP: 13 mmHg)  • Elevated pulmonary artery pressures (60/22(35 mmHg)) in the setting of PCWP: 13 mmHg.   • Normal cardiac output and index (CO: 5.1 L/min, CI: 3.2 L/min/m2)  • PVR: 4.3 Wood units. SVR: 1840 dynes/sec/cm5      TTE (8/22/2022, personally reviewed):  · Normal-sized LV.  Normal LV systolic function. Estimated EF 55- 60%. Wall motion appears grossly normal. Mild concentric left ventricular hypertrophy. Grade I LV diastolic dysfunction.  · Pulmonic valve not well visualized. Grossly normal pulmonic valve structure. Trace pulmonic valve regurgitation. No pulmonic valve stenosis.  · Aortic valve not well visualized. Aortic valve not well seen but likely trileaflet. Focal aortic valve calcification present. Sclerotic aortic valve leaflets. Mild aortic valve regurgitation. No aortic valve stenosis.  · RV not well visualized. Normal-sized RV. Normal RV systolic function.  · Normal-sized RA.  · There is a small loculated pericardial effusion anterior to the right atrium. No cardiac tamponade.  · Sclerotic mitral valve. Mitral valve calcification of the anterior leaflet present.  · Trace mitral valve regurgitation.  · No significant mitral valve stenosis.  · Normal-sized IVC. IVC demonstrates normal respiratory collapse.  · Tricuspid valve structure is grossly normal. Mild to moderate tricuspid valve regurgitation.  · Estimated RVSP = 50-55 mmHg.  · Mildly dilated LA.  · No previous echocardiogram available for comparison.     TTE (4/21/2022, outside study):  This result has an attachment that is not available.   •  A complete transthoracic echocardiogram (including 2D, color flow   Doppler, spectral Doppler and M-mode imaging) was performed using the   standard protocol. The study quality was adequate.   •  The left ventricle is normal in size. There is moderately increased   wall thickness consistent with moderate concentric hypertrophy.   Endocardium is incompletely visualized, but no regional wall motion   abnormalities are noted in the visualized segments. Normal left   ventricular ejection fraction. The left ventricular ejection fraction by   visual estimate is 55% to 60%.   •  The right ventricle is normal in size. There is normal function of the   right ventricle.   •  The left  atrium is normal in size. Left atrial volume is 58 mL.   •  The right atrial volume measures 52 mL. The right atrial volume index   measures 34 mL/m2.   •  There is trace mitral regurgitation. There is no mitral stenosis.   •  The aortic valve is trileaflet. There is mild aortic valve thickening.   There is no aortic stenosis. There is trace aortic regurgitation.   •  There is mild to moderate tricuspid regurgitation. Pulmonary artery   systolic pressure measures 52 mmHg. The pulmonary artery systolic pressure   is moderately elevated.   •  The aorta measures 3.2 cm at the sinus of Valsalva. The proximal   segment of the ascending aorta is mildly enlarged. The proximal segment of   the ascending aorta measures 3.4 cm. The proximal segment of the ascending   aorta measures 2.2 cm/m2, indexed for body surface area.   •  Trivial pericardial effusion present. There is no echocardiographic   evidence of cardiac tamponade.   •  The inferior vena cava is normal in size (diameter <21 mm) and   decreases >50% in size with inspiration, suggesting a normal right atrial   pressure of 3 mmHg (range 0-5 mm Hg).   •  Compared to the prior study of 3/13/2020, there are no significant   changes.        PFT (3/23/2022):      PFT (7/14/2021):      CT Chest (5/2/2021, outside study):  CLINICAL INFORMATION: Systemic sclerosis. Interstitial lung disease. Groundglass nodule     PROCEDURE: Unenhanced CT of the chest was performed using thin section reconstructions. Images were obtained on inspiration and expiration.     COMPARISON: June and August 2020     FINDINGS:     LUNGS, PLEURA:     Groundglass opacities with reticulation with subpleural sparing in the lower lobes, without honeycombing or architectural distortion, unchanged.     Right upper lobe groundglass nodule, image 114 of series 3, 8 x 11 mm, previously 6 mm.     Expiratory images are of diagnostic quality and do not demonstrate evidence of clinically significant air trapping.      CARDIOVASCULAR, MEDIASTINUM, THYROID: Coronary calcifications. Trace pericardial effusion.     LYMPH NODES: Subcentimeter mediastinal lymph nodes, unchanged. Unchanged axillary lymph nodes.     SKELETON, CHEST WALL: No destructive bone lesion     UPPER ABDOMEN: Patulous esophagus. 3 mm right renal stone.       RHC (9/02/2020):  RA   8 mmHg   RV   40/5 mmHg   PA (mean)  44/16 (25) mmHg   PCWP   12 mmHg   CO   4.65 lpm (Thermodilution)   CI   2.65 lpm/m-squared   SVI    33 ml/beat/m-squared (lower limit normal 29)   PVR   2.8 mmHg/l/min (Wood units)   SVR   2064 dyne-sec-cm-5         PFT (3/18/2020):      TTE (3/13/2020, outside study):  · The study quality was good.   · The left ventricle is normal in size. Top normal left ventricular wall   thickness. There are no segmental wall motion abnormalities. The left   ventricular ejection fraction is 55-60% by visual estimate and 3D   echocardiography. Normal left ventricular ejection fraction.   · There is grade I (mild) LV diastolic dysfunction.   · The right ventricle is normal in size. There is normal function of the   right ventricle.   · The right atrium is mildly dilated.   · There is mild mitral valve anterior leaflet thickening. There is mild   mitral valve leaflet calcification. There is flattening of the mitral   leaflets without raphael prolapse. There is trace mitral regurgitation.   · The aortic valve is trileaflet. There is mild thickening of the aortic   valve. There is mild calcification of the aortic valve. There is no aortic   /stenosis. There is trace aortic regurgitation.   · There is mild tricuspid valve leaflet thickening. There is mild to   moderate tricuspid regurgitation. The pulmonary artery systolic pressure   is moderately elevated. Pulmonary artery systolic pressure measures 50   mmHg. There is mid-systolic notching of the RVOT VTI consistent with   elevated pulmonary vascular resistance.   · The inferior vena cava is normal in size  (diameter <21 mm) and decreases   >50% in size with inspiration, suggesting a normal right atrial pressure   of 3 mmHg (range 0-5 mm Hg).   · Trivial loculated pericardial effusion present adjacent to the right   ventricle and adjacent to the right atrium.          Assessment / Plan:     1. Pulmonary Hypertension (WHO Group I, NYHA/WHO FC II/III): Occurring in the background of Systemic Scleroderma with ILD. August 2022 TTE showed normal RV size and function, but progressive exertional decline, worsened DLCO and resting tachycardia suggest there may be progression of her pulmonary vascular disease and limitation of cardiac output. This was proven with 11/2022 RHC showing mean PA 35 mmHg (with PCWP 13 mmHg) and PVR 4.3 Wood units.     I asked her to initiate Sildenafil 20 mg TID at that time, which she has tolerated well. I would like her to start Macitentan 10 mg daily for combination therapy. We have started prior authorization and cost assistance for this today.     I have asked her to get V/Q scan checked in September 2023 and repeat echocardiogram in 3-4 months. She will get 6-minute walk test at her next office visit.      2. HTN: Borderline controlled. She is continued on Amlodipine.     3. Systemic Scleroderma / Raynaud's Disease: Per Rheumatology (Dr. Giraldo).      4. ILD: Per Pulmonology (Dr. Flesch) and Rheumatology (Dr. Giraldo).    5. PE: Diagnosed at outside hospital by V/Q scan. I do not have these images available for review. She is currently on Apixaban but reports intermittent nosebleeds and worsened coughing up blood. I will continue Apixaban at this time, but will have low threshold for CTA Chest if she is showing that she is not tolerating anticoagulation.       Thank you for allowing me to participate in the care of your patient. I would like to see her in the office in 3 months.     Please do not hesitate to contact me with any questions or issues.     Sincerely,     Timothy Arvizu, DO  Cardiology /  "Heart Failure / Pulmonary Hypertension    [email or web address]    [phone]    [facility]  [facility]  [address] [place] [address]      Counseling and coordination of care consisted of more than 50% of this complex medical evaluation; numerous diagnostic and management options were considered and testing results were reviewed and discussed with the patient as best as able, during this encounter; the patient has multiple medical problems and is on numerous medications; the patient has a high risk of complications due to their cardiac disease, and all these resulted in the assigned \"complex medical decision-making\" characterization and coding of this patient encounter.  I spent 40 minutes on this date of service performing the following activities: obtaining history, performing examination, entering orders, documenting, preparing for visit, obtaining / reviewing records, providing counseling and education and independently reviewing study/studies.  "

## 2023-04-06 ENCOUNTER — OFFICE VISIT (OUTPATIENT)
Dept: CARDIOLOGY | Facility: CLINIC | Age: 63
End: 2023-04-06
Payer: COMMERCIAL

## 2023-04-06 ENCOUNTER — TELEPHONE (OUTPATIENT)
Dept: CARDIOLOGY | Facility: CLINIC | Age: 63
End: 2023-04-06

## 2023-04-06 VITALS
SYSTOLIC BLOOD PRESSURE: 130 MMHG | HEART RATE: 117 BPM | HEIGHT: 66 IN | BODY MASS INDEX: 18.64 KG/M2 | DIASTOLIC BLOOD PRESSURE: 74 MMHG | WEIGHT: 116 LBS

## 2023-04-06 DIAGNOSIS — J84.9 INTERSTITIAL LUNG DISEASE (CMS/HCC): ICD-10-CM

## 2023-04-06 DIAGNOSIS — E78.49 OTHER HYPERLIPIDEMIA: Primary | ICD-10-CM

## 2023-04-06 DIAGNOSIS — I10 ESSENTIAL (PRIMARY) HYPERTENSION: ICD-10-CM

## 2023-04-06 DIAGNOSIS — I73.00 RAYNAUD'S DISEASE WITHOUT GANGRENE: ICD-10-CM

## 2023-04-06 DIAGNOSIS — I51.9 RIGHT VENTRICULAR DYSFUNCTION: ICD-10-CM

## 2023-04-06 DIAGNOSIS — M34.9 SCLERODERMA (CMS/HCC): ICD-10-CM

## 2023-04-06 DIAGNOSIS — Z86.711 HISTORY OF PULMONARY EMBOLISM: ICD-10-CM

## 2023-04-06 DIAGNOSIS — I27.20 PULMONARY HYPERTENSION (CMS/HCC): ICD-10-CM

## 2023-04-06 PROCEDURE — 3008F BODY MASS INDEX DOCD: CPT | Performed by: INTERNAL MEDICINE

## 2023-04-06 PROCEDURE — 3075F SYST BP GE 130 - 139MM HG: CPT | Performed by: INTERNAL MEDICINE

## 2023-04-06 PROCEDURE — 3078F DIAST BP <80 MM HG: CPT | Performed by: INTERNAL MEDICINE

## 2023-04-06 PROCEDURE — 93000 ELECTROCARDIOGRAM COMPLETE: CPT | Performed by: INTERNAL MEDICINE

## 2023-04-06 PROCEDURE — 99215 OFFICE O/P EST HI 40 MIN: CPT | Performed by: INTERNAL MEDICINE

## 2023-04-06 RX ORDER — SILDENAFIL CITRATE 20 MG/1
20 TABLET ORAL 3 TIMES DAILY
Qty: 90 TABLET | Refills: 3 | Status: SHIPPED | OUTPATIENT
Start: 2023-04-06 | End: 2024-01-04

## 2023-04-06 NOTE — TELEPHONE ENCOUNTER
[employer] Prior authorization started-- sent to plan along with supporting documentation.    CaseId:[id number];Status:Approved;Review Type:Prior Auth;Coverage Start Date:03/07/2023;Coverage End Date:04/05/2026;    Enrollment paperwork prepared and mailed to PT will submit when returned

## 2023-04-06 NOTE — TELEPHONE ENCOUNTER
Please pre cert pt for echo @[facility] 6.21.23 NPI [id number]     Also lung perfusion test scheduled 9.12.23 @[facility] NPI [id number] Ty

## 2023-04-06 NOTE — TELEPHONE ENCOUNTER
Wilman Moon -     Can you please start prior authorization / cost assistance for Macitentan 10 mg daily?    ThanksTimothy

## 2023-04-06 NOTE — LETTER
2023     Sara Simmons MD  [address]  [facility] [address]    Patient: Arielle Felix  YOB: 1960  Date of Visit: 2023      Dear Dr. Simmons:    Thank you for referring Arielle Felix to me for evaluation. Below are my notes for this consultation.    If you have questions, please do not hesitate to call me. I look forward to following your patient along with you.         Sincerely,        Timothy Arvizu,         CC: MD Robert Tao MD Clark, John, DO  2023  7:32 PM  Signed  I had the pleasure of seeing Arielle Felix (: 1960) in the [facility] Heart Failure and Pulmonary Hypertension clinic on 2023.     Ms. Felix is a 62 y.o. female with a past medical history of Pulmonary HTN, HTN, Raynaud's Disease, Cutaneous Systemic Scleroderma (dx ), ILD, PE (3/2023). She is a patient of Dr. Nguyễn. She was previously followed by Dr. Jos Valdez at [facility]. She had stress test 2013 was negative for ischemia. Echocardiogram from 2022 showed LVEF: 55-60%, mild aortic valve thickening, pulmonary artery systolic pressure: 52 mmHg and trivial pericardial effusion. LHC and RHC (separate occasions) over the last 5-10 years which showed normal hemodynamics and normal coronaries. She had a RHC 2020 showing top-normal right sided filling pressures with mild pulmonary hypertension, top-normal PVR and normal pulmonary capillary wedge pressure. The cardiac index was normal, seen below.     She has previously followed up with Dr. Judd Flesch at [facility] Pulmonology. PFTs 3/18/2020. Last OV 3/23/22. CT chest and echo ordered. She reports that she will be initiating care with Dr. Pratibha Abbott. She was to follow up with Dr. Giraldo of Rheumatology.     On 2022, she was in [facility] [place] chest pain. EKG: NSR without ischemic changes. hsTrop: 12->16, d-dimer: 0.56, CXR showed cardiomegaly and diffuse interstitial  "prominence. She was discharged home and advised to follow up with cardiology.     Dr. Nguyễn ordered echocardiogram, which was completed on 8/22/2022 and showed estimated RVSP = 50-55 mmHg, normal-sized LV with normal LV systolic function. Estimated EF 55- 60%. Wall motion grossly normal, mild concentric left ventricular hypertrophy, grade I LV diastolic dysfunction, probably normal RV size and function.    At her initial visit on 10/11/2022, she reported that she felt very short of breath with minimal activity most of the time. She reports that this started about 1 year prior and had progressively gotten worse. She described PND and bendopnea. She reported that she experienced ankle swelling, worsened over the past year and usually worse towards the end of the night. She also reported some swelling in her abdomen. She reported daily palpations.    At her initial visit I recommended a RHC which was done 11/28/2022 and showed: Normal right sided filling pressures  Mildly elevated left sided filling pressures. Precapillary pulmonary hypertension with mean PA 36 mmHg    She reports that she was hospitalized on 3/19/2023 at [facility] for PE diagnosed on V/Q scan. She reports that she had presented with left arm pain and heaviness. She states that she was started on Apixaban. She reports on-going fatigue, shortness of breath and chest pain, which are about the same as her baseline prior to her hospitalization. She notes that she coughs up blood with clots every, and this has been going on for about a year. She reports that the amount is \"up and down\". She states that it is largely unchanged since she started on Apixaban. She does note that last Friday was particularly bad. She reports on-going Vertigo, which improves with Meclizine. She denies light-headedness, syncope or near syncope. She reports that she fell and hit her head about 4-5 months ago (before she was on blood thinner). She reports a " "constant pressure at her left sternum. She reports that it is a pressure (\"not burning or sharp\"). She denies any radiation or change with exertion. She cannot identify any aggravating or relieving factors, but states that the sensation is constant. She reports intermittent shortness of breath with rest. She reports needing to take a break after 4-5 steps of exertion. She describes occasional swelling of her feet but not ankles or legs. She denies abdominal distension. She reports sleeping on 2-3 pillows at night. She reports that she can wake up short of breath and coughing at times.     She reports medication adherence. She reports that she has not been weighing herself at home.     Rheumatology: Enzo ([facility])  Pulmonology: Flesch -> Pratibha Abbott ([facility] - upcoming appointment)     Past Medical / Surgical History:  Pulmonary HTN, HTN, Raynaud's Disease, Cutaneous Systemic Scleroderma (dx 1998), ILD, PE (3/2023), Follicular Carcinoma of Thyroid, Tenosynovitis, de Quervain, h/o Hernia Repair, h/o Appendicitis, h/o Digit Amputation, H/o Total Thyroidectomy (Dr. Pacheco at [facility]; 4/2013)    Family & Social History: She is , she has two children. She works as an .     Tobacco: former 2005  EtOH: never   Illicits: never     Her brother has CAD with stent  1st cousin with SLE  Both parents had \"heart problems\"    Allergies:   Allergies   Allergen Reactions   • Aspirin Shortness of breath     Other reaction(s): Unknown  \"stop breathing\"     • Ibuprofen Shortness of breath     Stop breathing   • Iodine Shortness of breath     Other reaction(s): Unknown   • Iodine And Iodide Containing Products Shortness of breath     ? rash   • Penicillins Shortness of breath     Other reaction(s): Unknown   • Sulfa (Sulfonamide Antibiotics) Angioedema   • Clindamycin    • Hydrochlorothiazide      Other reaction(s): Abdominal Pain   Slow heart rate   • Oxycodone      Other reaction(s): Vomiting   • " Sulfamethoxazole-Trimethoprim      Other reaction(s): Vomiting, Weakness    • Latex Rash       Medications:   Current Outpatient Medications   Medication Sig Dispense Refill   • amLODIPine (NORVASC) 5 mg tablet Take 10 mg by mouth daily. Two 5 mg tablets in the AM and 2 tablet (5 mg) at night PRN.     • balsam peru-castor oiL (VENELEX) ointment Apply topically 2 (two) times a day. 60 g 5   • biotin 1 mg tablet Take 1,000 mcg by mouth.     • cholecalciferol, vitamin D3, 1,000 unit (25 mcg) tablet Take 1,000 Units by mouth daily.     • cyanocobalamin, vitamin B-12, 1,000 mcg capsule Take 1,000 mcg by mouth daily.     • docusate sodium (COLACE) 100 mg capsule Take 100 mg by mouth daily.     • gentamicin (GARAMYCIN) 0.1 % ointment Apply topically daily. 60 g 1   • levothyroxine (SYNTHROID) 100 mcg tablet Take 100 mcg by mouth daily.     • meclizine HCl (MECLIZINE ORAL) Take by mouth.     • sildenafiL, pulm.hypertension, (REVATIO) 20 mg tablet Take 1 tablet (20 mg total) by mouth 3 (three) times a day. 90 tablet 3   • sodium chloride (SALINE WOUND WASH) 0.9 % solution 1 application daily. Use to cleanse your wounds daily 210 mL 0   • clobetasoL (TEMOVATE) 0.05 % ointment Apply topically 2 (two) times a day for 15 days. 45 g 3   • pantoprazole (PROTONIX) 40 mg EC tablet Take 40 mg by mouth daily.       No current facility-administered medications for this visit.       Review of Systems:   All other systems reviewed and are negative except as per HPI.    Physical Exam:     Wt Readings from Last 10 Encounters:   04/06/23 52.6 kg (116 lb)   01/18/23 54.4 kg (120 lb)   11/28/22 55.3 kg (122 lb)   10/07/22 56.2 kg (124 lb)   08/22/22 57.2 kg (126 lb)   08/12/22 57.2 kg (126 lb)   06/27/22 57.6 kg (127 lb)   06/18/22 56.7 kg (125 lb)   11/27/20 63.5 kg (140 lb)       BP Readings from Last 5 Encounters:   04/06/23 130/74   01/18/23 140/70   11/28/22 (!) 149/70   10/07/22 140/81   08/22/22 138/84       Vitals:    04/06/23 1046    BP: 130/74   Pulse: (!) 117       Body mass index is 18.72 kg/m².  Body surface area is 1.57 meters squared.    Constitutional: NAD, AAOx3  HENT: Normocephalic, atraumatic head, sclerae anicteric, no cervical lymphadenopathy, trachea midline  Cardiovascular: RRR, no murmurs, rubs or gallops, JVP: 8 mmHg   cm H2O, no carotid bruits.  Respiratory: CTA bilateral lung fields, no wheezes, rales or rhonchi  GI: soft, non-tender/non-distended  Musculoskeletal / Extremities: no peripheral edema, +2 pulses bilateral radial, DP/PT arteries, skin tightening on face and fingertips  Skin: no rashes  Neuro / Psych: no focal deficits, CNII-XII grossly intact, appropriate, cooperative    Diagnostic Data:     Component      Latest Ref Rng & Units 6/27/2022 6/27/2022           3:57 PM  5:53 PM   High Sens Troponin I      <15 pg/mL 12 16 (H)      Component      Latest Ref Rng & Units 6/27/2022   BNP      <=100 pg/mL 111 (H)      Component      Latest Ref Rng & Units 6/27/2022   WBC      3.80 - 10.50 K/uL 7.99   RBC      3.93 - 5.22 M/uL 4.05   Hemoglobin      11.8 - 15.7 g/dL 11.2 (L)   Hematocrit      35.0 - 45.0 % 35.8   MCV      83.0 - 98.0 fL 88.4   MCH      28.0 - 33.2 pg 27.7 (L)   MCHC      32.2 - 35.5 g/dL 31.3 (L)   RDW      11.7 - 14.4 % 15.6 (H)   Platelets      150 - 369 K/uL 243      Component      Latest Ref Rng & Units 6/27/2022   Sodium      136 - 144 mEQ/L 138   Potassium      3.6 - 5.1 mEQ/L 4.8   Chloride      98 - 109 mEQ/L 107   CO2      22 - 32 mEQ/L 23   BUN      8 - 20 mg/dL 14   Creatinine      0.6 - 1.1 mg/dL 1.0   Glucose      70 - 99 mg/dL 86   Calcium      8.9 - 10.3 mg/dL 9.1   AST (SGOT)      15 - 41 IU/L 30   ALT (SGPT)      11 - 54 IU/L 13   Alkaline Phosphatase      35 - 126 IU/L 71   Total Protein      6.0 - 8.2 g/dL 8.3 (H)   Albumin      3.4 - 5.0 g/dL 2.8 (L)   Bilirubin, Total      0.3 - 1.2 mg/dL 0.7   eGFR      >=60.0 mL/min/1.73m*2 >60.0   Anion Gap      3 - 15 mEQ/L 8                                           ECG (4/6/2023, personally reviewed): Sinus Tachycardia (HR: 107 bpm), ANA, old anterior infarct pattern, non-specific T-wave abnormality    TTE (11/28/2022, personally reviewed):   • Normal right- and mildly elevated left-sided filling pressures (RA: 3 mmHg, PCWP: 13 mmHg)  • Elevated pulmonary artery pressures (60/22(35 mmHg)) in the setting of PCWP: 13 mmHg.   • Normal cardiac output and index (CO: 5.1 L/min, CI: 3.2 L/min/m2)  • PVR: 4.3 Wood units. SVR: 1840 dynes/sec/cm5      TTE (8/22/2022, personally reviewed):  · Normal-sized LV. Normal LV systolic function. Estimated EF 55- 60%. Wall motion appears grossly normal. Mild concentric left ventricular hypertrophy. Grade I LV diastolic dysfunction.  · Pulmonic valve not well visualized. Grossly normal pulmonic valve structure. Trace pulmonic valve regurgitation. No pulmonic valve stenosis.  · Aortic valve not well visualized. Aortic valve not well seen but likely trileaflet. Focal aortic valve calcification present. Sclerotic aortic valve leaflets. Mild aortic valve regurgitation. No aortic valve stenosis.  · RV not well visualized. Normal-sized RV. Normal RV systolic function.  · Normal-sized RA.  · There is a small loculated pericardial effusion anterior to the right atrium. No cardiac tamponade.  · Sclerotic mitral valve. Mitral valve calcification of the anterior leaflet present.  · Trace mitral valve regurgitation.  · No significant mitral valve stenosis.  · Normal-sized IVC. IVC demonstrates normal respiratory collapse.  · Tricuspid valve structure is grossly normal. Mild to moderate tricuspid valve regurgitation.  · Estimated RVSP = 50-55 mmHg.  · Mildly dilated LA.  · No previous echocardiogram available for comparison.     TTE (4/21/2022, outside study):  This result has an attachment that is not available.   •  A complete transthoracic echocardiogram (including 2D, color flow   Doppler, spectral Doppler and M-mode imaging) was  performed using the   standard protocol. The study quality was adequate.   •  The left ventricle is normal in size. There is moderately increased   wall thickness consistent with moderate concentric hypertrophy.   Endocardium is incompletely visualized, but no regional wall motion   abnormalities are noted in the visualized segments. Normal left   ventricular ejection fraction. The left ventricular ejection fraction by   visual estimate is 55% to 60%.   •  The right ventricle is normal in size. There is normal function of the   right ventricle.   •  The left atrium is normal in size. Left atrial volume is 58 mL.   •  The right atrial volume measures 52 mL. The right atrial volume index   measures 34 mL/m2.   •  There is trace mitral regurgitation. There is no mitral stenosis.   •  The aortic valve is trileaflet. There is mild aortic valve thickening.   There is no aortic stenosis. There is trace aortic regurgitation.   •  There is mild to moderate tricuspid regurgitation. Pulmonary artery   systolic pressure measures 52 mmHg. The pulmonary artery systolic pressure   is moderately elevated.   •  The aorta measures 3.2 cm at the sinus of Valsalva. The proximal   segment of the ascending aorta is mildly enlarged. The proximal segment of   the ascending aorta measures 3.4 cm. The proximal segment of the ascending   aorta measures 2.2 cm/m2, indexed for body surface area.   •  Trivial pericardial effusion present. There is no echocardiographic   evidence of cardiac tamponade.   •  The inferior vena cava is normal in size (diameter <21 mm) and   decreases >50% in size with inspiration, suggesting a normal right atrial   pressure of 3 mmHg (range 0-5 mm Hg).   •  Compared to the prior study of 3/13/2020, there are no significant   changes.        PFT (3/23/2022):      PFT (7/14/2021):      CT Chest (5/2/2021, outside study):  CLINICAL INFORMATION: Systemic sclerosis. Interstitial lung disease. Groundglass nodule      PROCEDURE: Unenhanced CT of the chest was performed using thin section reconstructions. Images were obtained on inspiration and expiration.     COMPARISON: June and August 2020     FINDINGS:     LUNGS, PLEURA:     Groundglass opacities with reticulation with subpleural sparing in the lower lobes, without honeycombing or architectural distortion, unchanged.     Right upper lobe groundglass nodule, image 114 of series 3, 8 x 11 mm, previously 6 mm.     Expiratory images are of diagnostic quality and do not demonstrate evidence of clinically significant air trapping.     CARDIOVASCULAR, MEDIASTINUM, THYROID: Coronary calcifications. Trace pericardial effusion.     LYMPH NODES: Subcentimeter mediastinal lymph nodes, unchanged. Unchanged axillary lymph nodes.     SKELETON, CHEST WALL: No destructive bone lesion     UPPER ABDOMEN: Patulous esophagus. 3 mm right renal stone.       RHC (9/02/2020):  RA   8 mmHg   RV   40/5 mmHg   PA (mean)  44/16 (25) mmHg   PCWP   12 mmHg   CO   4.65 lpm (Thermodilution)   CI   2.65 lpm/m-squared   SVI    33 ml/beat/m-squared (lower limit normal 29)   PVR   2.8 mmHg/l/min (Wood units)   SVR   2064 dyne-sec-cm-5         PFT (3/18/2020):      TTE (3/13/2020, outside study):  · The study quality was good.   · The left ventricle is normal in size. Top normal left ventricular wall   thickness. There are no segmental wall motion abnormalities. The left   ventricular ejection fraction is 55-60% by visual estimate and 3D   echocardiography. Normal left ventricular ejection fraction.   · There is grade I (mild) LV diastolic dysfunction.   · The right ventricle is normal in size. There is normal function of the   right ventricle.   · The right atrium is mildly dilated.   · There is mild mitral valve anterior leaflet thickening. There is mild   mitral valve leaflet calcification. There is flattening of the mitral   leaflets without raphael prolapse. There is trace mitral regurgitation.   · The  aortic valve is trileaflet. There is mild thickening of the aortic   valve. There is mild calcification of the aortic valve. There is no aortic   /stenosis. There is trace aortic regurgitation.   · There is mild tricuspid valve leaflet thickening. There is mild to   moderate tricuspid regurgitation. The pulmonary artery systolic pressure   is moderately elevated. Pulmonary artery systolic pressure measures 50   mmHg. There is mid-systolic notching of the RVOT VTI consistent with   elevated pulmonary vascular resistance.   · The inferior vena cava is normal in size (diameter <21 mm) and decreases   >50% in size with inspiration, suggesting a normal right atrial pressure   of 3 mmHg (range 0-5 mm Hg).   · Trivial loculated pericardial effusion present adjacent to the right   ventricle and adjacent to the right atrium.          Assessment / Plan:     1. Pulmonary Hypertension (WHO Group I, NYHA/WHO FC II/III): Occurring in the background of Systemic Scleroderma with ILD. August 2022 TTE showed normal RV size and function, but progressive exertional decline, worsened DLCO and resting tachycardia suggest there may be progression of her pulmonary vascular disease and limitation of cardiac output. This was proven with 11/2022 RHC showing mean PA 35 mmHg (with PCWP 13 mmHg) and PVR 4.3 Wood units.     I asked her to initiate Sildenafil 20 mg TID at that time, which she has tolerated well. I would like her to start Macitentan 10 mg daily for combination therapy. We have started prior authorization and cost assistance for this today.     I have asked her to get V/Q scan checked in September 2023 and repeat echocardiogram in 3-4 months. She will get 6-minute walk test at her next office visit.      2. HTN: Borderline controlled. She is continued on Amlodipine.     3. Systemic Scleroderma / Raynaud's Disease: Per Rheumatology (Dr. Giraldo).      4. ILD: Per Pulmonology (Dr. Flesch) and Rheumatology (Dr. Giraldo).    5. PE: Diagnosed  "at outside hospital by V/Q scan. I do not have these images available for review. She is currently on Apixaban but reports intermittent nosebleeds and worsened coughing up blood. I will continue Apixaban at this time, but will have low threshold for CTA Chest if she is showing that she is not tolerating anticoagulation.       Thank you for allowing me to participate in the care of your patient. I would like to see her in the office in 3 months.     Please do not hesitate to contact me with any questions or issues.     Sincerely,     Timothy Arvizu, DO  Cardiology / Heart Failure / Pulmonary Hypertension    [email or web address]    [phone]    [facility]  [facility]  [address]   [place] [id number]      Counseling and coordination of care consisted of more than 50% of this complex medical evaluation; numerous diagnostic and management options were considered and testing results were reviewed and discussed with the patient as best as able, during this encounter; the patient has multiple medical problems and is on numerous medications; the patient has a high risk of complications due to their cardiac disease, and all these resulted in the assigned \"complex medical decision-making\" characterization and coding of this patient encounter.  I spent 40 minutes on this date of service performing the following activities: obtaining history, performing examination, entering orders, documenting, preparing for visit, obtaining / reviewing records, providing counseling and education and independently reviewing study/studies.    "

## 2023-04-06 NOTE — PATIENT INSTRUCTIONS
We are starting approval process for new medication, Macitentan (Opsumit).   Schedule V/Q scan for 6 months (mid-September 2023)  Schedule Echocardiogram for 3-4 months (before coming back to the office)  Follow up in the office with 6-minute walk test in 3-4 months.     Call with any questions or issues: [phone]

## 2023-04-13 ENCOUNTER — APPOINTMENT (EMERGENCY)
Dept: RADIOLOGY | Facility: HOSPITAL | Age: 63
DRG: 196 | End: 2023-04-13
Payer: COMMERCIAL

## 2023-04-13 ENCOUNTER — HOSPITAL ENCOUNTER (OUTPATIENT)
Facility: HOSPITAL | Age: 63
Setting detail: OBSERVATION
Discharge: HOME | DRG: 196 | End: 2023-04-19
Attending: EMERGENCY MEDICINE | Admitting: HOSPITALIST
Payer: COMMERCIAL

## 2023-04-13 ENCOUNTER — APPOINTMENT (EMERGENCY)
Dept: RADIOLOGY | Facility: HOSPITAL | Age: 63
DRG: 196 | End: 2023-04-13
Attending: EMERGENCY MEDICINE
Payer: COMMERCIAL

## 2023-04-13 DIAGNOSIS — M34.9 SCLERODERMA (CMS/HCC): ICD-10-CM

## 2023-04-13 DIAGNOSIS — J84.10 PULMONARY FIBROSIS (CMS/HCC): ICD-10-CM

## 2023-04-13 DIAGNOSIS — I51.7 CARDIOMEGALY: ICD-10-CM

## 2023-04-13 DIAGNOSIS — I31.39 PERICARDIAL EFFUSION: Primary | ICD-10-CM

## 2023-04-13 DIAGNOSIS — R06.00 DYSPNEA, UNSPECIFIED TYPE: ICD-10-CM

## 2023-04-13 DIAGNOSIS — Z86.711 HISTORY OF PULMONARY EMBOLISM: ICD-10-CM

## 2023-04-13 LAB
ALBUMIN SERPL-MCNC: 3 G/DL (ref 3.4–5)
ALP SERPL-CCNC: 78 IU/L (ref 35–126)
ALT SERPL-CCNC: 9 IU/L (ref 11–54)
ANION GAP SERPL CALC-SCNC: 8 MEQ/L (ref 3–15)
AST SERPL-CCNC: 16 IU/L (ref 15–41)
ATRIAL RATE: 136
BACTERIA URNS QL MICRO: NORMAL /HPF
BASE EXCESS BLDV CALC-SCNC: 1 MEQ/L
BASOPHILS # BLD: 0.06 K/UL (ref 0.01–0.1)
BASOPHILS NFR BLD: 0.8 %
BILIRUB SERPL-MCNC: 0.2 MG/DL (ref 0.3–1.2)
BILIRUB UR QL STRIP.AUTO: NEGATIVE MG/DL
BNP SERPL-MCNC: 105 PG/ML
BUN SERPL-MCNC: 15 MG/DL (ref 8–20)
CALCIUM SERPL-MCNC: 9.4 MG/DL (ref 8.9–10.3)
CHLORIDE SERPL-SCNC: 107 MEQ/L (ref 98–109)
CLARITY UR REFRACT.AUTO: CLEAR
CO2 BLDV-SCNC: 27.3 MEQ/L (ref 22–32)
CO2 SERPL-SCNC: 25 MEQ/L (ref 22–32)
COLOR UR AUTO: COLORLESS
CREAT SERPL-MCNC: 1 MG/DL (ref 0.6–1.1)
DIFFERENTIAL METHOD BLD: ABNORMAL
EOSINOPHIL # BLD: 0.18 K/UL (ref 0.04–0.36)
EOSINOPHIL NFR BLD: 2.5 %
ERYTHROCYTE [DISTWIDTH] IN BLOOD BY AUTOMATED COUNT: 16.3 % (ref 11.7–14.4)
FIO2 ON VENT: ABNORMAL %
FLUAV RNA SPEC QL NAA+PROBE: NEGATIVE
FLUBV RNA SPEC QL NAA+PROBE: NEGATIVE
GFR SERPL CREATININE-BSD FRML MDRD: >60 ML/MIN/1.73M*2
GLUCOSE SERPL-MCNC: 96 MG/DL (ref 70–99)
GLUCOSE UR STRIP.AUTO-MCNC: NEGATIVE MG/DL
HCO3 BLDV-SCNC: 25.4 MEQ/L (ref 21–28)
HCT VFR BLDCO AUTO: 34.3 % (ref 35–45)
HGB BLD-MCNC: 10.6 G/DL (ref 11.8–15.7)
HGB UR QL STRIP.AUTO: NEGATIVE
HYALINE CASTS #/AREA URNS LPF: NORMAL /LPF
IMM GRANULOCYTES # BLD AUTO: 0.02 K/UL (ref 0–0.08)
IMM GRANULOCYTES NFR BLD AUTO: 0.3 %
INHALED O2 CONCENTRATION: ABNORMAL %
KETONES UR STRIP.AUTO-MCNC: NEGATIVE MG/DL
LACTATE SERPL-SCNC: 1 MMOL/L (ref 0.4–2)
LEUKOCYTE ESTERASE UR QL STRIP.AUTO: NEGATIVE
LYMPHOCYTES # BLD: 1.51 K/UL (ref 1.2–3.5)
LYMPHOCYTES NFR BLD: 21.2 %
MCH RBC QN AUTO: 27.1 PG (ref 28–33.2)
MCHC RBC AUTO-ENTMCNC: 30.9 G/DL (ref 32.2–35.5)
MCV RBC AUTO: 87.7 FL (ref 83–98)
MONOCYTES # BLD: 0.33 K/UL (ref 0.28–0.8)
MONOCYTES NFR BLD: 4.6 %
NEUTROPHILS # BLD: 5.02 K/UL (ref 1.7–7)
NEUTS SEG NFR BLD: 70.6 %
NITRITE UR QL STRIP.AUTO: NEGATIVE
NRBC BLD-RTO: 0 %
P AXIS: 71
PCO2 BLDV: 42 MM HG (ref 41–51)
PDW BLD AUTO: 11.6 FL (ref 9.4–12.3)
PH BLDV: 7.4 [PH] (ref 7.32–7.42)
PH UR STRIP.AUTO: 7 [PH]
PLATELET # BLD AUTO: 267 K/UL (ref 150–369)
PO2 BLDV: 53 MM HG (ref 25–40)
POTASSIUM SERPL-SCNC: 4.6 MEQ/L (ref 3.6–5.1)
PR INTERVAL: 138
PROT SERPL-MCNC: 8.3 G/DL (ref 6–8.2)
PROT UR QL STRIP.AUTO: 2
QRS DURATION: 70
QT INTERVAL: 292
QTC CALCULATION(BAZETT): 439
R AXIS: -9
RBC # BLD AUTO: 3.91 M/UL (ref 3.93–5.22)
RBC #/AREA URNS HPF: NORMAL /HPF
RSV RNA SPEC QL NAA+PROBE: NEGATIVE
SARS-COV-2 RNA RESP QL NAA+PROBE: NEGATIVE
SODIUM SERPL-SCNC: 140 MEQ/L (ref 136–144)
SP GR UR REFRACT.AUTO: 1.01
SQUAMOUS URNS QL MICRO: NORMAL /HPF
T WAVE AXIS: 87
TROPONIN I SERPL HS-MCNC: 16 PG/ML
TROPONIN I SERPL HS-MCNC: 23 PG/ML
UROBILINOGEN UR STRIP-ACNC: 0.2 EU/DL
VENTRICULAR RATE: 136
WBC # BLD AUTO: 7.12 K/UL (ref 3.8–10.5)
WBC #/AREA URNS HPF: NORMAL /HPF

## 2023-04-13 PROCEDURE — 84443 ASSAY THYROID STIM HORMONE: CPT | Performed by: STUDENT IN AN ORGANIZED HEALTH CARE EDUCATION/TRAINING PROGRAM

## 2023-04-13 PROCEDURE — 93005 ELECTROCARDIOGRAM TRACING: CPT

## 2023-04-13 PROCEDURE — 71045 X-RAY EXAM CHEST 1 VIEW: CPT

## 2023-04-13 PROCEDURE — 85025 COMPLETE CBC W/AUTO DIFF WBC: CPT | Performed by: EMERGENCY MEDICINE

## 2023-04-13 PROCEDURE — G1004 CDSM NDSC: HCPCS

## 2023-04-13 PROCEDURE — 83036 HEMOGLOBIN GLYCOSYLATED A1C: CPT | Performed by: STUDENT IN AN ORGANIZED HEALTH CARE EDUCATION/TRAINING PROGRAM

## 2023-04-13 PROCEDURE — 25000000 HC PHARMACY GENERAL: Performed by: EMERGENCY MEDICINE

## 2023-04-13 PROCEDURE — 84484 ASSAY OF TROPONIN QUANT: CPT | Mod: 91 | Performed by: EMERGENCY MEDICINE

## 2023-04-13 PROCEDURE — 81001 URINALYSIS AUTO W/SCOPE: CPT

## 2023-04-13 PROCEDURE — 93010 ELECTROCARDIOGRAM REPORT: CPT | Performed by: INTERNAL MEDICINE

## 2023-04-13 PROCEDURE — 71275 CT ANGIOGRAPHY CHEST: CPT | Mod: ME

## 2023-04-13 PROCEDURE — 12000000 HC ROOM AND CARE MED/SURG

## 2023-04-13 PROCEDURE — 80053 COMPREHEN METABOLIC PANEL: CPT | Performed by: EMERGENCY MEDICINE

## 2023-04-13 PROCEDURE — 83880 ASSAY OF NATRIURETIC PEPTIDE: CPT

## 2023-04-13 PROCEDURE — 93005 ELECTROCARDIOGRAM TRACING: CPT | Mod: 59 | Performed by: HOSPITALIST

## 2023-04-13 PROCEDURE — 84484 ASSAY OF TROPONIN QUANT: CPT | Performed by: EMERGENCY MEDICINE

## 2023-04-13 PROCEDURE — 87637 SARSCOV2&INF A&B&RSV AMP PRB: CPT

## 2023-04-13 PROCEDURE — 63600000 HC DRUGS/DETAIL CODE: Performed by: EMERGENCY MEDICINE

## 2023-04-13 PROCEDURE — 99285 EMERGENCY DEPT VISIT HI MDM: CPT | Mod: 25

## 2023-04-13 PROCEDURE — 96374 THER/PROPH/DIAG INJ IV PUSH: CPT | Mod: 59

## 2023-04-13 PROCEDURE — 93005 ELECTROCARDIOGRAM TRACING: CPT | Performed by: EMERGENCY MEDICINE

## 2023-04-13 PROCEDURE — 96375 TX/PRO/DX INJ NEW DRUG ADDON: CPT | Mod: 59

## 2023-04-13 PROCEDURE — 36415 COLL VENOUS BLD VENIPUNCTURE: CPT

## 2023-04-13 PROCEDURE — 63600105 HC IODINE BASED CONTRAST: Performed by: EMERGENCY MEDICINE

## 2023-04-13 PROCEDURE — 83605 ASSAY OF LACTIC ACID: CPT

## 2023-04-13 RX ORDER — FAMOTIDINE 10 MG/ML
20 INJECTION INTRAVENOUS ONCE
Status: COMPLETED | OUTPATIENT
Start: 2023-04-13 | End: 2023-04-13

## 2023-04-13 RX ORDER — FUROSEMIDE 10 MG/ML
20 INJECTION INTRAMUSCULAR; INTRAVENOUS ONCE
Status: COMPLETED | OUTPATIENT
Start: 2023-04-13 | End: 2023-04-14

## 2023-04-13 RX ORDER — DIPHENHYDRAMINE HCL 50 MG/ML
25 VIAL (ML) INJECTION ONCE
Status: COMPLETED | OUTPATIENT
Start: 2023-04-13 | End: 2023-04-13

## 2023-04-13 RX ADMIN — DIPHENHYDRAMINE HYDROCHLORIDE 25 MG: 50 INJECTION INTRAMUSCULAR; INTRAVENOUS at 21:31

## 2023-04-13 RX ADMIN — METHYLPREDNISOLONE SODIUM SUCCINATE 125 MG: 125 INJECTION, POWDER, FOR SOLUTION INTRAMUSCULAR; INTRAVENOUS at 21:32

## 2023-04-13 RX ADMIN — IOHEXOL 80 ML: 350 INJECTION, SOLUTION INTRAVENOUS at 22:47

## 2023-04-13 RX ADMIN — FAMOTIDINE 20 MG: 10 INJECTION, SOLUTION INTRAVENOUS at 21:32

## 2023-04-13 ASSESSMENT — ENCOUNTER SYMPTOMS
NAUSEA: 0
FEVER: 0
BACK PAIN: 0
SPEECH DIFFICULTY: 0
WEAKNESS: 0
SHORTNESS OF BREATH: 1
COUGH: 1
VOMITING: 0
NECK PAIN: 0
ABDOMINAL PAIN: 0
DIFFICULTY URINATING: 0
HEADACHES: 0
ACTIVITY CHANGE: 0
AGITATION: 0
DIARRHEA: 0
SEIZURES: 0
COLOR CHANGE: 0

## 2023-04-13 NOTE — ED ATTESTATION NOTE
"I have personally seen and examined Arielle Felix.  I was involved in the care and medical decision making for this patient.    I reviewed and agree with physician assistant / nurse practitioners assessment and plan of care; any exceptions are documented below.      My focused history, examination, assessment and plan of care of Arielle Felix is as follows:    Brief History:  HPI    Pt is a 61 yo female with hx of HTN, ILD, scleroderma and PE diagnosed 3/24, and pulm htn who states she still feels short of breath since her hospitalization at the outside hospital.  She has been coughing and today she coughed up \"more blood\" than usual.    Focused Physical Exam:  Physical Exam    Chronically ill-appearing  No JVD  tachycardic  Diminished breath sounds  No edema  Clear speech    ECG  Sinus tachycardia 136 bpm  nml intervals  No STEMI    Assessment / Plan / MDM:  MDM    Patient presents with shortness of breath.  She has a history of ILD, pulmonary hypertension, and recently diagnosed PE.  She was tachycardic but no respiratory distress.  Differentials broad including pneumonia, PE, heart failure, ILD exacerbation and others.  CTA was done and there is no pulmonary embolism.  She does have evidence of heart failure and/or pericardial effusion.  Steroids had been ordered.  Will give Lasix and admit.      I was physically present for the key/critical portions of the following procedures:  Procedures           Maninder Warren MD  04/13/23 [id number]    "

## 2023-04-13 NOTE — ED PROVIDER NOTES
Emergency Medicine Note  HPI   HISTORY OF PRESENT ILLNESS     62-year-old female history of interstitial lung disease, pulmonary hypertension, scleroderma, RV dysfunction, hypertension hyperlipidemia, PE on Eliquis, presents emergency department for continued shortness of breath, chest pressure, and hemoptysis.  On 3/19/2023 she was seen at [facility] where she had a VQ scan, and she was diagnosed with PE and placed on Eliquis.  She has not felt improved since then.            Patient History   PAST HISTORY     Reviewed from Nursing Triage:  Meds       Past Medical History:   Diagnosis Date    De Quervain's tenosynovitis     Essential (primary) hypertension     Follicular carcinoma of thyroid (CMS/HCC)     Gastro-esophageal reflux     Hand ulceration (CMS/HCC)     Hyperlipidemia, unspecified     Interstitial lung disease (CMS/HCC)     Leg ulcer (CMS/HCC)     Lung nodule     Lupus (CMS/HCC)     Osteomyelitis of hand (CMS/HCC)     Osteoporosis     Pulmonary hypertension (CMS/HCC)     Raynaud's disease     Severe    Reflux esophagitis     Scleroderma (CMS/HCC)     Screening mammogram for breast cancer 2021    BI-RADS category: 1 - Negative (care everywhere @ [facility])       Past Surgical History:   Procedure Laterality Date    CARDIAC CATHETERIZATION      COLONOSCOPY      HERNIA REPAIR         Family History   Problem Relation Age of Onset    Heart disease Biological Brother         stent    Breast cancer Niece     Cervical cancer Neg Hx     Uterine cancer Neg Hx     Colon cancer Neg Hx     Ovarian cancer Neg Hx        Social History     Tobacco Use    Smoking status: Former     Types: Cigarettes     Quit date: 9/15/2005     Years since quittin.5    Smokeless tobacco: Never    Tobacco comments:     Would smoke 1/2 of 1/2 PPD, quit in    Vaping Use    Vaping status: Never Used   Substance Use Topics    Alcohol use: Never    Drug use: Never         Review of Systems   REVIEW OF  SYSTEMS     Review of Systems   Constitutional: Negative for activity change and fever.   Respiratory: Positive for cough and shortness of breath.    Cardiovascular: Positive for chest pain. Negative for leg swelling.   Gastrointestinal: Negative for abdominal pain, diarrhea, nausea and vomiting.   Genitourinary: Negative for difficulty urinating.   Musculoskeletal: Negative for back pain and neck pain.   Skin: Negative for color change.   Neurological: Negative for seizures, syncope, speech difficulty, weakness and headaches.   Psychiatric/Behavioral: Negative for agitation and behavioral problems.         VITALS     ED Vitals    Date/Time Temp Pulse Resp BP SpO2 [facility]   04/14/23 0400 36.4 °C (97.5 °F) 94 19 149/68 94 % CK   04/14/23 0200 -- 90 19 140/71 96 % DML   04/14/23 0007 -- 94 -- 140/75 96 % BC   04/13/23 2140 -- 100 20 146/69 99 % CBC   04/13/23 1804 36.5 °C (97.7 °F) 140 24 156/80 100 % FERMIN        Pulse Ox %: 100 % (04/13/23 1928)  Pulse Ox Interpretation: Normal (04/13/23 1928)  Heart Rate: 140 (04/13/23 1928)  Rhythm Strip Interpretation: Sinus Tachycardia (04/13/23 1928)     Physical Exam   PHYSICAL EXAM     Physical Exam  Vitals and nursing note reviewed.   Constitutional:       Appearance: She is well-developed.   HENT:      Head: Normocephalic and atraumatic.   Eyes:      Conjunctiva/sclera: Conjunctivae normal.   Cardiovascular:      Rate and Rhythm: Normal rate and regular rhythm.      Heart sounds: Normal heart sounds.   Pulmonary:      Effort: Pulmonary effort is normal.      Breath sounds: Examination of the right-upper field reveals decreased breath sounds. Examination of the left-upper field reveals decreased breath sounds. Examination of the right-middle field reveals decreased breath sounds. Examination of the left-middle field reveals decreased breath sounds. Examination of the right-lower field reveals decreased breath sounds. Examination of the left-lower field reveals decreased breath  sounds. Decreased breath sounds present.   Chest:      Chest wall: No crepitus. There is no dullness to percussion.   Abdominal:      General: There is no distension.      Palpations: Abdomen is soft. There is no mass.      Tenderness: There is no abdominal tenderness.   Musculoskeletal:         General: No tenderness or deformity. Normal range of motion.      Cervical back: Normal range of motion.   Skin:     General: Skin is warm and dry.   Neurological:      General: No focal deficit present.      Mental Status: She is alert. Mental status is at baseline.   Psychiatric:         Behavior: Behavior normal.           PROCEDURES     Procedures     DATA     Results     Procedure Component Value Units Date/Time    [place] Draw Panel [[id number]] Collected: 04/13/23 1949    Specimen: Blood, Venous Updated: 04/14/23 0401    Narrative:      The following orders were created for panel order [place] Draw Panel.  Procedure                               Abnormality         Status                     ---------                               -----------         ------                     RAINBOW PINK[[id number]]                                     Final result               [place] Blood Culture[[id number]]                            Final result               [place] Blood Culture[[id number]]                            Final result                 Please view results for these tests on the individual orders.    [place] Blood Culture [[id number]] Collected: 04/13/23 1949    Specimen: Blood, Venous Updated: 04/14/23 0401    [place] Blood Culture [[id number]] Collected: 04/13/23 1949    Specimen: Blood, Venous Updated: 04/14/23 0401    [place] Draw Panel [[id number]] Collected: 04/13/23 1949    Specimen: Blood, Venous Updated: 04/14/23 0401    Narrative:      The following orders were created for panel order [place] Draw Panel.  Procedure                               Abnormality         Status                     ---------                                -----------         ------                     RAINBOW LT BLUE[[id number]]                                  Final result                 Please view results for these tests on the individual orders.    RAINBOW LT BLUE [[id number]] Collected: 04/13/23 1949    Specimen: Blood, Venous Updated: 04/14/23 0401    RAINBOW PINK [[id number]] Collected: 04/13/23 1949    Specimen: Blood, Venous Updated: 04/14/23 0401    SARS-CoV-2 (COVID-19), PCR Nasopharynx [[id number]]  (Normal) Collected: 04/13/23 2142    Specimen: Nasopharyngeal Swab from Nasopharynx Updated: 04/13/23 2304    Narrative:      The following orders were created for panel order SARS-CoV-2 (COVID-19), PCR Nasopharynx.  Procedure                               Abnormality         Status                     ---------                               -----------         ------                     SARS-COV-2 (COVID-19)/ F...[[id number]]  Normal              Final result                 Please view results for these tests on the individual orders.    SARS-COV-2 (COVID-19)/ FLU A/B, AND RSV, PCR Nasopharynx [[id number]]  (Normal) Collected: 04/13/23 2142    Specimen: Nasopharyngeal Swab from Nasopharynx Updated: 04/13/23 2304     SARS-CoV-2 (COVID-19) Negative     Influenza A Negative     Influenza B Negative     Respiratory Syncytial Virus Negative    Narrative:      Testing performed using real-time PCR for detection of COVID-19. EUA approved validation studies performed on site.     UA with reflex culture [[id number]]  (Abnormal) Collected: 04/13/23 2142    Specimen: Urine, Clean Catch Updated: 04/13/23 2222    Narrative:      The following orders were created for panel order UA with reflex culture.  Procedure                               Abnormality         Status                     ---------                               -----------         ------                     UA Reflex to Culture (Ma...[[id number]]  Abnormal            Final result               UA  Microscopic[[id number]]               Normal              Final result                 Please view results for these tests on the individual orders.    UA Microscopic [[id number]]  (Normal) Collected: 04/13/23 2142    Specimen: Urine, Clean Catch Updated: 04/13/23 2222     RBC, Urine 0 TO 4 /HPF      WBC, Urine 0 TO 3 /HPF      Squamous Epithelial None Seen /hpf      Hyaline Cast None Seen /lpf      Bacteria, Urine None Seen /HPF     UA Reflex to Culture (Macroscopic) [[id number]]  (Abnormal) Collected: 04/13/23 2142    Specimen: Urine, Clean Catch Updated: 04/13/23 2212     Color, Urine Colorless     Clarity, Urine Clear     Specific Gravity, Urine 1.013     pH, Urine 7.0     Leukocyte Esterase Negative     Comment: Results can be falsely negative due to high specific gravity, some antibiotics, glucose >3 g/dl, or WBC other than neutrophils.        Nitrite, Urine Negative     Protein, Urine +2     Glucose, Urine Negative mg/dL      Ketones, Urine Negative mg/dL      Urobilinogen, Urine 0.2 EU/dL      Bilirubin, Urine Negative mg/dL      Blood, Urine Negative     Comment: The sensitivity of the occult blood test is equivalent to approximately 4 intact RBC/HPF.       HS Troponin I (with 2 hour reflex) [[id number]]  (Abnormal) Collected: 04/13/23 1949    Specimen: Blood, Venous Updated: 04/13/23 2045     High Sens Troponin I 16.0 pg/mL     Comprehensive metabolic panel [[id number]]  (Abnormal) Collected: 04/13/23 1949    Specimen: Blood, Venous Updated: 04/13/23 2037     Sodium 140 mEQ/L      Potassium 4.6 mEQ/L      Comment: Results obtained on plasma. Plasma Potassium values may be up to 0.4 mEQ/L less than serum values. The differences may be greater for patients with high platelet or white cell counts.        Chloride 107 mEQ/L      CO2 25 mEQ/L      BUN 15 mg/dL      Creatinine 1.0 mg/dL      Glucose 96 mg/dL      Calcium 9.4 mg/dL      AST (SGOT) 16 IU/L      ALT (SGPT) 9 IU/L      Alkaline Phosphatase 78 IU/L       Total Protein 8.3 g/dL      Comment: Test performed on plasma which typically contains approximately 0.4 g/dL more protein than serum.        Albumin 3.0 g/dL      Bilirubin, Total 0.2 mg/dL      eGFR >60.0 mL/min/1.73m*2      Anion Gap 8 mEQ/L     B-type natriuretic peptide [[id number]]  (Abnormal) Collected: 04/13/23 1949    Specimen: Blood, Venous Updated: 04/13/23 2035      pg/mL     CBC and differential [[id number]]  (Abnormal) Collected: 04/13/23 1949    Specimen: Blood, Venous Updated: 04/13/23 2014     WBC 7.12 K/uL      RBC 3.91 M/uL      Hemoglobin 10.6 g/dL      Hematocrit 34.3 %      MCV 87.7 fL      MCH 27.1 pg      MCHC 30.9 g/dL      RDW 16.3 %      Platelets 267 K/uL      MPV 11.6 fL      Differential Type Auto     nRBC 0.0 %      Immature Granulocytes 0.3 %      Neutrophils 70.6 %      Lymphocytes 21.2 %      Monocytes 4.6 %      Eosinophils 2.5 %      Basophils 0.8 %      Immature Granulocytes, Absolute 0.02 K/uL      Neutrophils, Absolute 5.02 K/uL      Lymphocytes, Absolute 1.51 K/uL      Monocytes, Absolute 0.33 K/uL      Eosinophils, Absolute 0.18 K/uL      Basophils, Absolute 0.06 K/uL     VBG with Lactate [[id number]]  (Abnormal) Collected: 04/13/23 1949    Specimen: Blood, Venous Updated: 04/13/23 2010     pH, Venous 7.40     pCO2, Venous 42 mm Hg      pO2, Venous 53 mm Hg      HCO3, Venous 25.4 mEQ/L      Base Excess, Venous 1.0 mEQ/L      TCO2, Venous 27.3 mEQ/L      Lactate 1.0 mmol/L      Source Of Oxygen nc     FIO2 2l nc          Imaging Results          CT ANGIOGRAPHY CHEST PULMONARY EMBOLISM WITH IV CONTRAST (Final result)  Result time 04/14/23 08:15:30    Final result                 Impression:    IMPRESSION:  1. No CT evidence of pulmonary embolism.  2. Moderate cardiomegaly.  3. A small to moderate pericardial effusion is new from 2020.  4. Centrilobular emphysematous changes and bronchiectatic changes are present  throughout the lungs.  5. There are patchy parenchymal  infiltrates within the dependent portion of  bilateral lower lobes which spares the periphery. Findings raise suspicion for  superimposed pneumonia or inflammatory process. There is additional patchy  parenchymal infiltrate within the right upper lobe.  6. A 9 mm groundglass opacity is present within the right upper lobe best  visualized on axial image 48 of series 3. Follow-up as per Fleischner Society  guidelines is advised.  7. Right axillary and mediastinal adenopathy as described above is nonspecific  and may be related to the patient's known sarcoidosis. Other causes of  lymphadenopathy such as malignancy is not excluded.  8. A new  8.2 mm solid nodule is present within the medial aspect of the left  lower lobe. There are additional small nodules throughout both lungs. The  possibility of malignancy or metastatic disease is not excluded. If the patient  has previous chest CTs from outside institutions, direct comparison would be  useful to assess for stability versus interval change.    Fleischner Society 2017 Guidelines for Management of Incidentally Detected  Pulmonary Nodules in Adults. (Solid Nodules)    Nodule size >8 mm (single)  Low-Risk Patient - Consider CT, PET/CT or tissue sampling at 3 months  High-Risk Patient - Consider CT, PET/CT or tissue sampling at 3 months    Nodule size >8 mm (multiple)  Low-Risk Patient - CT at 3-6 months then consider CT at 18-24 months  High-Risk Patient - CT at 3-6 months, then at 18-24 months               Narrative:    CLINICAL HISTORY:Shortness of breath, chest pressure and hemoptysis. The patient  reports she was diagnosed on March 19, 2023 at [facility] with a  pulmonary embolism and was placed on the lymph node place.. History of  scleroderma.    COMMENT:Helical CT angiography of the chest was performed on April 13, 2023  during the administration of 80 cc of Omnipaque 350 contrast intravenously. Thin  section axial images were acquired during the  pulmonary arterial phase contrast  bolus administration. Postprocessing was performed and maximum intensity  projection reformations were created and reviewed. There are no previous chest  CTs for comparison. Comparison is made to an abdominal CT from November 27, 2020.    CT DOSE:  One or more dose reduction techniques (e.g. automated exposure  control, adjustment of the mA and/or kV according to patient size, use of  iterative reconstruction technique) utilized for this examination.    No filling defects are demonstrated throughout the pulmonary arterial tree and  the study is negative for pulmonary embolism. There is moderate cardiomegaly. A  small to moderate pericardial effusion is new from 2020. The pulmonary arteries  are enlarged consistent with underlying pulmonary hypertension. The thoracic  aorta is patent, normal in caliber and without dissection.    There are centrilobular emphysematous changes present throughout the lungs.  There are mild bronchiectatic changes noted bilaterally probably within the  lower lobes. Patchy parenchymal consolidation within bilateral lower lobes which  spares the periphery is suspicious for underlying pneumonia and/or inflammatory  process. Underlying pneumonia is not entirely excluded. There is additional  patchy parenchymal density within the right upper lobe which is nonspecific and  may be infectious or inflammatory in nature. A 9 mm groundglass opacity is  present within the right upper lobe on image 48 of series 3. This is entirely  nonspecific. Follow-up as per Fleischner Society guidelines is advised. Patchy  parenchymal density within the periphery of the right upper lobe on axial images  28-31. This probably reflects scarring. Follow-up imaging to be obtained to  assess for stability.    There is a nonspecific 8.2 mm solid nodule within the subpleural region of the  medial aspect of the left lower lobe best visualized on axial image 111 of  series 3. This was is  not present on previous abdominal CT performed November 27, 2020. There are additional small (less than 5 mm) nodules scattered  throughout the lungs which are nonspecific as well. The possibility of  malignancy is not excluded. Follow-up as per Fleischner Society guidelines is  advised.    The visualized lower neck is unremarkable. There are prominent right axillary  lymph nodes measuring up to 19 mm. There are additional prominent prevascular,  pretracheal and bilateral hilar lymph nodes which are nonspecific. These may be  related to the patient's known scleroderma. Malignant adenopathy is not  excluded.    The esophagus is distended and fluid-filled which is likely related to the  patient's scleroderma. A distal esophageal stricture or lesion is not excluded  on the basis exam.    Scans through the upper abdomen demonstrate the visualized upper abdominal  organs to be unremarkable.    The visualized skeleton is unremarkable.    A concordant preliminary report was rendered by Dr. Ramos on April 13, 2023  at 11:16 PM.    Actionable Finding: Follow up should be considered.                               X-RAY CHEST 1 VIEW (Final result)  Result time 04/13/23 20:43:18   Procedure changed from X-RAY CHEST 2 VIEWS     Final result                 Impression:    IMPRESSION:  1. Mild pulmonary vascular congestion suspected.  2. Cardiomegaly.  3. Stable diffuse interstitial prominence, likely chronic.             Narrative:    CLINICAL HISTORY: Shortness of breath.    COMMENT: Portable AP erect chest radiograph was obtained. Comparison is made  with prior studies including most recent chest radiograph dated 6/27/2022 and a  CT of the abdomen and pelvis dated 11/27/2020.    Cardiac silhouette is enlarged and unchanged. Hilar and mediastinal contours are  stable. Mild pulmonary vascular congestion is suspected when compared with to  the most recent prior study. There is unchanged diffuse interstitial  prominence  particularly at the lung bases which has been described previously and is  probably chronic. No evidence of pneumothorax or appreciable pleural effusion.  Regional osseous structures are grossly intact.                                ECG 12 lead   Final Result      ECG 12 lead   Final Result          Scoring tools                                  ED Course & MDM   MDM / ED COURSE / CLINICAL IMPRESSION / DISPO     Medical Decision Making  Patient arrives short of breath and tachycardic.  She had been placed on Eliquis after being found to have a PE viewed VQ scan about a month ago.  Patient was ordered a CT scan with prep did not have apparent PE.  She was however found to have pericardial effusion worsening pulmonary fibrosis and cardiomegaly.  She was admitted for further evaluation of her shortness of breath    Cardiomegaly: chronic illness or injury  Pericardial effusion: acute illness or injury  Pulmonary fibrosis (CMS/HCC): acute illness or injury  Amount and/or Complexity of Data Reviewed  Labs: ordered.  Radiology: ordered.  ECG/medicine tests: ordered.      Risk  Prescription drug management.  Decision regarding hospitalization.          ED Course as of 04/15/23 0149   u Apr 13, 2023   2155 Heart Rate: 100 [TYLER]   2330   History: PE SUSPECTED    Preliminary Impression:    No evidence of pulmonary embolism up to the proximal segmental branches.    Diffuse coarse interstitial reticulation with patchy areas of groundglass opacity, sparing the subpleural region, suggestive of acute exacerbation of interstitial pulmonary fibrosis, airway inflammation versus underlying pulmonary edema.    Significant cardiomegaly with moderate pericardial effusion. Enlarged pulmonary artery, suggestive of pulmonary hypertension.    Multiple bilateral pulmonary nodules. Short-term three-month follow-up with CT chest recommended.    Multiple prominent mediastinal and bilateral perihilar lymph nodes. [TYLER]   2339 [facility] [place]   [TYLER]      ED Course User Index  [TYLER] Maximino Contreras CRNP     Clinical Impression      Pericardial effusion   Pulmonary fibrosis (CMS/HCC)   Cardiomegaly     _________________     ED Disposition   Admit / Observation                   Maximino Contreras CRNP  04/15/23 0149

## 2023-04-14 ENCOUNTER — APPOINTMENT (INPATIENT)
Dept: RADIOLOGY | Facility: HOSPITAL | Age: 63
DRG: 196 | End: 2023-04-14
Payer: COMMERCIAL

## 2023-04-14 ENCOUNTER — APPOINTMENT (INPATIENT)
Dept: CARDIOLOGY | Facility: HOSPITAL | Age: 63
DRG: 196 | End: 2023-04-14
Attending: STUDENT IN AN ORGANIZED HEALTH CARE EDUCATION/TRAINING PROGRAM
Payer: COMMERCIAL

## 2023-04-14 PROBLEM — R06.02 SHORTNESS OF BREATH: Status: RESOLVED | Noted: 2022-08-12 | Resolved: 2023-04-14

## 2023-04-14 PROBLEM — R06.00 DYSPNEA: Status: ACTIVE | Noted: 2023-04-14

## 2023-04-14 PROBLEM — I50.33 ACUTE ON CHRONIC HEART FAILURE WITH PRESERVED EJECTION FRACTION (CMS/HCC): Status: ACTIVE | Noted: 2023-04-14

## 2023-04-14 PROBLEM — E88.09 HYPOALBUMINEMIA: Status: RESOLVED | Noted: 2022-07-11 | Resolved: 2023-04-14

## 2023-04-14 PROBLEM — Z86.711 HISTORY OF PULMONARY EMBOLISM: Status: ACTIVE | Noted: 2023-04-14

## 2023-04-14 PROBLEM — R04.2 HEMOPTYSIS: Status: ACTIVE | Noted: 2023-04-14

## 2023-04-14 PROBLEM — R63.4 WEIGHT LOSS: Status: ACTIVE | Noted: 2023-04-14

## 2023-04-14 PROBLEM — D64.9 ANEMIA: Status: ACTIVE | Noted: 2023-04-14

## 2023-04-14 PROBLEM — Z51.5 PALLIATIVE CARE BY SPECIALIST: Status: ACTIVE | Noted: 2023-04-14

## 2023-04-14 PROBLEM — R53.81 DEBILITY: Status: ACTIVE | Noted: 2023-04-14

## 2023-04-14 PROBLEM — R42 VERTIGO: Status: ACTIVE | Noted: 2023-04-14

## 2023-04-14 LAB
ANION GAP SERPL CALC-SCNC: 11 MEQ/L (ref 3–15)
ANION GAP SERPL CALC-SCNC: 9 MEQ/L (ref 3–15)
AORTIC ROOT ANNULUS - M-MODE: 3.2 CM
AORTIC ROOT ANNULUS - M-MODE: 3.2 CM
AORTIC VALVE MEAN VELOCITY: 1.15 M/S
AORTIC VALVE VELOCITY TIME INTEGRAL: 36.2 CM
ASCENDING AORTA: 3.6 CM
ATRIAL RATE: 135
AV MEAN GRADIENT: 6 MMHG
AV PEAK GRADIENT: 13 MMHG
AV PEAK VELOCITY-S: 1.77 M/S
AV VALVE AREA INDEX: 1.03
AV VALVE AREA: 1.46 CM2
AV VELOCITY RATIO: 0.5
AVA (VTI): 1.58 CM2
BSA FOR ECHO PROCEDURE: 1.53 M2
BUN SERPL-MCNC: 15 MG/DL (ref 8–20)
BUN SERPL-MCNC: 21 MG/DL (ref 8–20)
CALCIUM SERPL-MCNC: 9.5 MG/DL (ref 8.9–10.3)
CALCIUM SERPL-MCNC: 9.7 MG/DL (ref 8.9–10.3)
CHLORIDE SERPL-SCNC: 105 MEQ/L (ref 98–109)
CHLORIDE SERPL-SCNC: 106 MEQ/L (ref 98–109)
CHOLEST SERPL-MCNC: 194 MG/DL
CO2 SERPL-SCNC: 23 MEQ/L (ref 22–32)
CO2 SERPL-SCNC: 24 MEQ/L (ref 22–32)
CREAT SERPL-MCNC: 1.1 MG/DL (ref 0.6–1.1)
CREAT SERPL-MCNC: 1.1 MG/DL (ref 0.6–1.1)
CRP SERPL-MCNC: 8.09 MG/L
CUSP SEPARATION: 1.6 CM
DOP CALC LVOT STROKE VOLUME: 57.15 CM3
E WAVE DECELERATION TIME: 204 MS
E/A RATIO: 0.8
E/E' RATIO: 15.4
E/LAT E' RATIO: 14.5
EDV (BP): 57.3 CM3
EF (A4C): 56.5 %
EF A2C: 55.3 %
EJECTION FRACTION: 54.8 %
ERYTHROCYTE [SEDIMENTATION RATE] IN BLOOD BY WESTERGREN METHOD: >119 MM/HR
EST. AVERAGE GLUCOSE BLD GHB EST-MCNC: 80 MG/DL
ESV (BP): 25.9 CM3
FERRITIN SERPL-MCNC: 75 NG/ML (ref 11–250)
FOLATE SERPL-MCNC: 15.4 NG/ML
FRACTIONAL SHORTENING: 28.28 %
GFR SERPL CREATININE-BSD FRML MDRD: >60 ML/MIN/1.73M*2
GFR SERPL CREATININE-BSD FRML MDRD: >60 ML/MIN/1.73M*2
GLUCOSE SERPL-MCNC: 131 MG/DL (ref 70–99)
GLUCOSE SERPL-MCNC: 146 MG/DL (ref 70–99)
HBA1C MFR BLD HPLC: 4.4 %
HDLC SERPL-MCNC: 49 MG/DL
HDLC SERPL: 4 {RATIO}
INTERVENTRICULAR SEPTUM: 1.3 CM
IRON SATN MFR SERPL: 12 % (ref 15–45)
IRON SERPL-MCNC: 29 UG/DL (ref 35–150)
LA ESV (BP): 85.2 CM3
LA ESV INDEX (A2C): 54.9 CM3/M2
LA ESV INDEX (BP): 55.69 CM3/M2
LAAS-AP2: 25.5 CM2
LAAS-AP4: 26.2 CM2
LAD 2D: 2.8 CM
LALD A4C: 6.31 CM
LALD A4C: 6.46 CM
LAV-S: 84 CM3
LDLC SERPL CALC-MCNC: 136 MG/DL
LEFT ATRIUM VOLUME INDEX: 55.29 CM3/M2
LEFT ATRIUM VOLUME: 84.6 CM3
LEFT INTERNAL DIMENSION IN SYSTOLE: 2.79 CM (ref 2.26–3.43)
LEFT VENTRICLE DIASTOLIC VOLUME INDEX: 42.03 CM3/M2
LEFT VENTRICLE DIASTOLIC VOLUME: 64.3 CM3
LEFT VENTRICLE SYSTOLIC VOLUME INDEX: 18.3 CM3/M2
LEFT VENTRICLE SYSTOLIC VOLUME: 28 CM3
LEFT VENTRICULAR INTERNAL DIMENSION IN DIASTOLE: 3.89 CM (ref 3.81–5.29)
LEFT VENTRICULAR POSTERIOR WALL IN END DIASTOLE: 1.29 CM (ref 0.49–0.91)
LV DIASTOLIC VOLUME: 50.7 CM3
LV ESV (APICAL 2 CHAMBER): 22.6 CM3
LVAD-AP2: 22.1 CM2
LVAD-AP4: 24.6 CM2
LVAS-AP2: 13.1 CM2
LVAS-AP4: 14.5 CM2
LVCI: 1.7 L/MIN/M2
LVCO: 2.7 L/MIN
LVEDVI(A2C): 33.14 CM3/M2
LVEDVI(BP): 37.45 CM3/M2
LVESVI(A2C): 14.77 CM3/M2
LVESVI(BP): 16.93 CM3/M2
LVLD-AP2: 8.24 CM
LVLD-AP4: 8.09 CM
LVLS-AP2: 7.2 CM
LVLS-AP4: 6.63 CM
LVOT 2D: 2 CM
LVOT A: 3.14 CM2
LVOT MG: 2 MMHG
LVOT MV: 0.7 M/S
LVOT PEAK VELOCITY: 1.05 M/S
LVOT PG: 4 MMHG
LVOT STROKE VOLUME INDEX: 37.35 ML/M2
LVOT VTI: 18.2 CM
MAGNESIUM SERPL-MCNC: 1.9 MG/DL (ref 1.8–2.5)
MAGNESIUM SERPL-MCNC: 2 MG/DL (ref 1.8–2.5)
MLH CV ECHO AVA INDEX VELOCITY RATIO: 1
MV E'TISSUE VEL-LAT: 0.06 M/S
MV E'TISSUE VEL-MED: 0.06 M/S
MV PEAK A VEL: 1.01 M/S
MV PEAK E VEL: 0.85 M/S
NONHDLC SERPL-MCNC: 145 MG/DL
P AXIS: 65
POSTERIOR WALL: 1.29 CM
POTASSIUM SERPL-SCNC: 4.2 MEQ/L (ref 3.6–5.1)
POTASSIUM SERPL-SCNC: 5 MEQ/L (ref 3.6–5.1)
PR INTERVAL: 142
PV PEAK GRADIENT: 4 MMHG
PV PV: 1.02 M/S
QRS DURATION: 72
QT INTERVAL: 300
QTC CALCULATION(BAZETT): 450
R AXIS: -4
RVOT VMAX: 0.73 M/S
RVOT VTI: 12.1 CM
SODIUM SERPL-SCNC: 139 MEQ/L (ref 136–144)
SODIUM SERPL-SCNC: 139 MEQ/L (ref 136–144)
T WAVE AXIS: 94
TAPSE: 2.3 CM
TIBC SERPL-MCNC: 245 UG/DL (ref 270–460)
TR MAX PG: 30.69 MMHG
TRICUSPID VALVE PEAK REGURGITATION VELOCITY: 2.77 M/S
TRIGL SERPL-MCNC: 44 MG/DL (ref 30–149)
TSH SERPL DL<=0.05 MIU/L-ACNC: 4.71 MIU/L (ref 0.34–5.6)
UIBC SERPL-MCNC: 216 UG/DL (ref 180–360)
VENTRICULAR RATE: 135
VIT B12 SERPL-MCNC: 362 PG/ML (ref 180–914)
Z-SCORE OF LEFT VENTRICULAR DIMENSION IN END DIASTOLE: -1.43
Z-SCORE OF LEFT VENTRICULAR DIMENSION IN END SYSTOLE: 0.02
Z-SCORE OF LEFT VENTRICULAR POSTERIOR WALL IN END DIASTOLE: 3.45

## 2023-04-14 PROCEDURE — 83550 IRON BINDING TEST: CPT | Performed by: STUDENT IN AN ORGANIZED HEALTH CARE EDUCATION/TRAINING PROGRAM

## 2023-04-14 PROCEDURE — 85652 RBC SED RATE AUTOMATED: CPT

## 2023-04-14 PROCEDURE — 93306 TTE W/DOPPLER COMPLETE: CPT

## 2023-04-14 PROCEDURE — G0378 HOSPITAL OBSERVATION PER HR: HCPCS

## 2023-04-14 PROCEDURE — 99497 ADVNCD CARE PLAN 30 MIN: CPT | Mod: 25 | Performed by: NURSE PRACTITIONER

## 2023-04-14 PROCEDURE — 82746 ASSAY OF FOLIC ACID SERUM: CPT | Performed by: STUDENT IN AN ORGANIZED HEALTH CARE EDUCATION/TRAINING PROGRAM

## 2023-04-14 PROCEDURE — 93010 ELECTROCARDIOGRAM REPORT: CPT | Performed by: INTERNAL MEDICINE

## 2023-04-14 PROCEDURE — 63700000 HC SELF-ADMINISTRABLE DRUG

## 2023-04-14 PROCEDURE — 82607 VITAMIN B-12: CPT | Performed by: STUDENT IN AN ORGANIZED HEALTH CARE EDUCATION/TRAINING PROGRAM

## 2023-04-14 PROCEDURE — 83540 ASSAY OF IRON: CPT | Performed by: STUDENT IN AN ORGANIZED HEALTH CARE EDUCATION/TRAINING PROGRAM

## 2023-04-14 PROCEDURE — 21400000 HC ROOM AND CARE CCU/INTERMEDIATE

## 2023-04-14 PROCEDURE — 63600000 HC DRUGS/DETAIL CODE: Mod: JZ | Performed by: STUDENT IN AN ORGANIZED HEALTH CARE EDUCATION/TRAINING PROGRAM

## 2023-04-14 PROCEDURE — 83735 ASSAY OF MAGNESIUM: CPT | Performed by: STUDENT IN AN ORGANIZED HEALTH CARE EDUCATION/TRAINING PROGRAM

## 2023-04-14 PROCEDURE — 99223 1ST HOSP IP/OBS HIGH 75: CPT | Performed by: INTERNAL MEDICINE

## 2023-04-14 PROCEDURE — 99223 1ST HOSP IP/OBS HIGH 75: CPT | Mod: 25 | Performed by: NURSE PRACTITIONER

## 2023-04-14 PROCEDURE — 99223 1ST HOSP IP/OBS HIGH 75: CPT | Performed by: HOSPITALIST

## 2023-04-14 PROCEDURE — 80048 BASIC METABOLIC PNL TOTAL CA: CPT | Performed by: STUDENT IN AN ORGANIZED HEALTH CARE EDUCATION/TRAINING PROGRAM

## 2023-04-14 PROCEDURE — 97162 PT EVAL MOD COMPLEX 30 MIN: CPT | Mod: GP

## 2023-04-14 PROCEDURE — 200200 PR NO CHARGE: Performed by: HOSPITALIST

## 2023-04-14 PROCEDURE — 63600000 HC DRUGS/DETAIL CODE

## 2023-04-14 PROCEDURE — 93970 EXTREMITY STUDY: CPT

## 2023-04-14 PROCEDURE — 80061 LIPID PANEL: CPT | Performed by: STUDENT IN AN ORGANIZED HEALTH CARE EDUCATION/TRAINING PROGRAM

## 2023-04-14 PROCEDURE — 82728 ASSAY OF FERRITIN: CPT | Performed by: STUDENT IN AN ORGANIZED HEALTH CARE EDUCATION/TRAINING PROGRAM

## 2023-04-14 PROCEDURE — 36415 COLL VENOUS BLD VENIPUNCTURE: CPT | Performed by: STUDENT IN AN ORGANIZED HEALTH CARE EDUCATION/TRAINING PROGRAM

## 2023-04-14 PROCEDURE — 86140 C-REACTIVE PROTEIN: CPT

## 2023-04-14 PROCEDURE — 63700000 HC SELF-ADMINISTRABLE DRUG: Performed by: STUDENT IN AN ORGANIZED HEALTH CARE EDUCATION/TRAINING PROGRAM

## 2023-04-14 PROCEDURE — 93306 TTE W/DOPPLER COMPLETE: CPT | Mod: 26 | Performed by: INTERNAL MEDICINE

## 2023-04-14 RX ORDER — ACETAMINOPHEN 325 MG/1
650 TABLET ORAL EVERY 6 HOURS PRN
Status: DISCONTINUED | OUTPATIENT
Start: 2023-04-14 | End: 2023-04-19 | Stop reason: HOSPADM

## 2023-04-14 RX ORDER — IBUPROFEN 200 MG
16-32 TABLET ORAL AS NEEDED
Status: DISCONTINUED | OUTPATIENT
Start: 2023-04-14 | End: 2023-04-19 | Stop reason: HOSPADM

## 2023-04-14 RX ORDER — SILDENAFIL CITRATE 20 MG/1
20 TABLET ORAL 3 TIMES DAILY
Status: DISCONTINUED | OUTPATIENT
Start: 2023-04-14 | End: 2023-04-19 | Stop reason: HOSPADM

## 2023-04-14 RX ORDER — AMLODIPINE BESYLATE 5 MG/1
5-10 TABLET ORAL NIGHTLY PRN
COMMUNITY
End: 2024-04-16 | Stop reason: ALTCHOICE

## 2023-04-14 RX ORDER — DEXTROSE 40 %
15-30 GEL (GRAM) ORAL AS NEEDED
Status: DISCONTINUED | OUTPATIENT
Start: 2023-04-14 | End: 2023-04-19 | Stop reason: HOSPADM

## 2023-04-14 RX ORDER — PANTOPRAZOLE SODIUM 40 MG/1
40 TABLET, DELAYED RELEASE ORAL DAILY
Status: DISCONTINUED | OUTPATIENT
Start: 2023-04-14 | End: 2023-04-19 | Stop reason: HOSPADM

## 2023-04-14 RX ORDER — DICLOFENAC SODIUM 10 MG/G
GEL TOPICAL 2 TIMES DAILY PRN
COMMUNITY
End: 2023-04-19 | Stop reason: HOSPADM

## 2023-04-14 RX ORDER — FUROSEMIDE 10 MG/ML
20 INJECTION INTRAMUSCULAR; INTRAVENOUS DAILY
Status: DISCONTINUED | OUTPATIENT
Start: 2023-04-14 | End: 2023-04-19 | Stop reason: HOSPADM

## 2023-04-14 RX ORDER — LANOLIN ALCOHOL/MO/W.PET/CERES
1000 CREAM (GRAM) TOPICAL DAILY
Status: DISCONTINUED | OUTPATIENT
Start: 2023-04-14 | End: 2023-04-19 | Stop reason: HOSPADM

## 2023-04-14 RX ORDER — AMLODIPINE BESYLATE 5 MG/1
10 TABLET ORAL EVERY MORNING
COMMUNITY
End: 2024-11-30 | Stop reason: HOSPADM

## 2023-04-14 RX ORDER — MUPIROCIN 20 MG/G
1 OINTMENT TOPICAL DAILY
COMMUNITY

## 2023-04-14 RX ORDER — LEVOTHYROXINE SODIUM 100 UG/1
100 TABLET ORAL DAILY
Status: DISCONTINUED | OUTPATIENT
Start: 2023-04-14 | End: 2023-04-15

## 2023-04-14 RX ORDER — MECLIZINE HCL 12.5 MG 12.5 MG/1
12.5 TABLET ORAL 3 TIMES DAILY PRN
Status: DISCONTINUED | OUTPATIENT
Start: 2023-04-14 | End: 2023-04-19 | Stop reason: HOSPADM

## 2023-04-14 RX ORDER — DEXTROSE 50 % IN WATER (D50W) INTRAVENOUS SYRINGE
25 AS NEEDED
Status: DISCONTINUED | OUTPATIENT
Start: 2023-04-14 | End: 2023-04-19 | Stop reason: HOSPADM

## 2023-04-14 RX ORDER — ENOXAPARIN SODIUM 100 MG/ML
40 INJECTION SUBCUTANEOUS
Status: DISCONTINUED | OUTPATIENT
Start: 2023-04-14 | End: 2023-04-19 | Stop reason: HOSPADM

## 2023-04-14 RX ORDER — AMLODIPINE BESYLATE 10 MG/1
10 TABLET ORAL DAILY
Status: DISCONTINUED | OUTPATIENT
Start: 2023-04-14 | End: 2023-04-19 | Stop reason: HOSPADM

## 2023-04-14 RX ADMIN — CYANOCOBALAMIN TAB 1000 MCG 1000 MCG: 1000 TAB at 08:37

## 2023-04-14 RX ADMIN — LEVOTHYROXINE SODIUM 100 MCG: 0.1 TABLET ORAL at 08:37

## 2023-04-14 RX ADMIN — SILDENAFIL 20 MG: 20 TABLET, FILM COATED ORAL at 15:09

## 2023-04-14 RX ADMIN — ACETAMINOPHEN 650 MG: 325 TABLET ORAL at 17:23

## 2023-04-14 RX ADMIN — SILDENAFIL 20 MG: 20 TABLET, FILM COATED ORAL at 19:52

## 2023-04-14 RX ADMIN — SILDENAFIL 20 MG: 20 TABLET, FILM COATED ORAL at 08:37

## 2023-04-14 RX ADMIN — MECLIZINE 12.5 MG: 12.5 TABLET ORAL at 10:15

## 2023-04-14 RX ADMIN — ENOXAPARIN SODIUM 40 MG: 40 INJECTION SUBCUTANEOUS at 16:44

## 2023-04-14 RX ADMIN — PANTOPRAZOLE SODIUM 40 MG: 40 TABLET, DELAYED RELEASE ORAL at 08:37

## 2023-04-14 RX ADMIN — AMLODIPINE BESYLATE 10 MG: 10 TABLET ORAL at 08:37

## 2023-04-14 RX ADMIN — FUROSEMIDE 20 MG: 10 INJECTION, SOLUTION INTRAMUSCULAR; INTRAVENOUS at 00:49

## 2023-04-14 RX ADMIN — FUROSEMIDE 20 MG: 10 INJECTION, SOLUTION INTRAMUSCULAR; INTRAVENOUS at 08:37

## 2023-04-14 ASSESSMENT — COGNITIVE AND FUNCTIONAL STATUS - GENERAL
CLIMB 3 TO 5 STEPS WITH RAILING: 3 - A LITTLE
STANDING UP FROM CHAIR USING ARMS: 3 - A LITTLE
MOVING TO AND FROM BED TO CHAIR: 3 - A LITTLE
WALKING IN HOSPITAL ROOM: 3 - A LITTLE
AFFECT: WFL
MOVING TO AND FROM BED TO CHAIR: 3 - A LITTLE
STANDING UP FROM CHAIR USING ARMS: 3 - A LITTLE
CLIMB 3 TO 5 STEPS WITH RAILING: 3 - A LITTLE
WALKING IN HOSPITAL ROOM: 3 - A LITTLE

## 2023-04-14 ASSESSMENT — ENCOUNTER SYMPTOMS
BLOOD IN STOOL: 0
ANAL BLEEDING: 0
UNEXPECTED WEIGHT CHANGE: 1
CONSTIPATION: 0
NEUROLOGICAL NEGATIVE: 1
PSYCHIATRIC NEGATIVE: 1
DIARRHEA: 0
EYES NEGATIVE: 1
ENDOCRINE NEGATIVE: 1
SHORTNESS OF BREATH: 1
MUSCULOSKELETAL NEGATIVE: 1
HEMATOLOGIC/LYMPHATIC NEGATIVE: 1
APPETITE CHANGE: 1
ABDOMINAL DISTENTION: 0
PALPITATIONS: 0
FATIGUE: 1
COUGH: 1
ABDOMINAL PAIN: 1
CHEST TIGHTNESS: 1

## 2023-04-14 NOTE — CONSULTS
"Palliative Care Consult      Patient Name: Arielle GUAJARDO Isidro                                                                                        Patient MRN: [id number]  Date / Time Note Created: 4/14/2023 12:05 PM  Attending Physician: Shamir Leary MD  Referring Physician: No ref. provider found  Primary Care Physician: Sara Simmons MD   This is Hospital Day: 2    Conversation/Goals of Care:  Goals are restorative. I counseled patient regarding code status and the benefits and burdens of CPR and mechanical ventilation in the setting of current clinical condition and co-morbidities.  Discussed code status options and limitations of resuscitation, including likelihood of significant debility if successful. She would want full code but she would not want to be kept alive long term or on a vent long term. After a few weeks she would want to be \"let go\". She would never want to be in a nursing home long term. She also tells me she has declined medications for anxiety in the past. She is not interested in any psych meds and or pain medications.She has strong support through her family including 2 daughters, grandchildren and her godmother.     Assessment/Plan  Palliative care by specialist  Assessment & Plan  - Patient would like her 2 daughters to make her medical decisions if she is unable.   - She has never done a LW/AD  - Code status: Full Code  - Goals are restorative. Counseled patient on progressive nature of disease and implications of intubation/full code status. She would want resuscitation, at this time, however she would not want to be kept alive on a vent or in a tremendously debilitated state long term. She would not want to be in a NH long term.  - Patient with a strong mary in God.  consult placed  - Offered support with [facility] program. Patient has had home care through [place] and would like to continue with them.     Debility  Assessment & Plan  - Debility likely " multifactorial in the setting of multiple chronic med conditions including scleroderma and CHF  - Resides at home alone. Baseline functional status is independent with ADLs.  - Frailty in the context of muscle atrophy with functional decline, hypoalbuminemia and cachexia (BMI 17.79)  - Recently exacerbated by multiple recent admissions   - Pt remains high risk for further deterioration and functional decline.     Plan:  - Coninue supportive and restorative directed medical therapies per primary team  - PT/OT/SLT as appropriate  - Pt and family would benefit from ongoing information surrounding clinical status and prognostic awareness as available  - See also palliative care  - Pal care will continue to follow while inpatient for support and assistance with complex medical decision making around goals of care      Shortness of breath  Assessment & Plan  Patient with shortness of breath likely multifactorial etiology related to slight volume overload in the setting of ILD and deconditioning/debility. She reports she is short of breath with exertion at baseline but it becamse worse over the last few days. Recent admission for PE. She has associated cough. She reports it is worse with exertion and improved while at rest.    - Pulm following with possible plan to start cellcept for immunosuppression  - Patient is not interested in opiods for symptom management for shortness of breath.        Reason for Consult:  Assistance with clarification of goals of care    HPI       Source: the patient    Arielle Felix is an 62 y.o. female Cutaneous Systemic Scleroderma, Pulmonary HTN (RVSP 50-55 on TTE 8/22), HFpEF (G1DD), HTN, Raynaud's Disease, , ILD, PE (3/2023), vertigo (on meclizine), protein calorie malnutrition who presents with chest pressure, hemoptysis and SOB    Imaging of the chest shows mild pulmonary vascular congestion suspected, cardiomegaly and stable diffuse interstitial prominence, likely  chronic.    Palliative care is consulted for goals of care.     Patient denies pain, denies N/V, no rash, no itch.       Chart Review:  The following portions of the patients history were reviewed and updated as appropriate:    Palliative Assessment    FAST Score: Not Relevant  Current Palliative Performance Status: 50%  Baseline Palliative Performance Status: 50%   Estimated Palliative Prognosis: Variable  Capacity for Medical Decision Making: intact    Past Medical History  Past Medical History:   Diagnosis Date    De Quervain's tenosynovitis     Essential (primary) hypertension     Follicular carcinoma of thyroid (CMS/HCC)     Gastro-esophageal reflux     Hand ulceration (CMS/HCC)     Hyperlipidemia, unspecified     Interstitial lung disease (CMS/HCC)     Leg ulcer (CMS/HCC)     Lung nodule     Lupus (CMS/HCC)     Osteomyelitis of hand (CMS/HCC)     Osteoporosis     Pulmonary hypertension (CMS/HCC)     Raynaud's disease     Severe    Reflux esophagitis     Scleroderma (CMS/HCC)     Screening mammogram for breast cancer 05/04/2021    BI-RADS category: 1 - Negative (care everywhere @ [facility])       Past Surgical History  Past Surgical History:   Procedure Laterality Date    CARDIAC CATHETERIZATION      COLONOSCOPY      HERNIA REPAIR         Episcopal:  Voodoo  Zoroastrian / Spiritual:   No spiritual concerns identified    Review of Systems:  All other systems reviewed and negative except as noted in the HPI.    [facility]  [facility] (Outside records) query reviewed with no concerns.    Current Medications:    amLODIPine, 10 mg, oral, Daily    cyanocobalamin, 1,000 mcg, oral, Daily    glucose, 16-32 g of dextrose, oral, PRN **OR** dextrose, 15-30 g of dextrose, oral, PRN **OR** glucagon, 1 mg, intramuscular, PRN **OR** dextrose 50 % in water (D50), 25 mL, intravenous, PRN    enoxaparin, 40 mg, subcutaneous, Daily (6p)    furosemide, 20 mg, intravenous, Daily    levothyroxine,  "100 mcg, oral, Daily    meclizine, 12.5 mg, oral, 3x daily PRN    pantoprazole, 40 mg, oral, Daily    sildenafiL (pulm.hypertension), 20 mg, oral, TID    Allergies:  Allergies   Allergen Reactions    Aspirin Shortness of breath     Other reaction(s): Unknown  \"stop breathing\"      Ibuprofen Shortness of breath     Stop breathing    Iodine Shortness of breath     Other reaction(s): Unknown    Iodine And Iodide Containing Products Shortness of breath     ? rash    Penicillins Shortness of breath     Other reaction(s): Unknown    Sulfa (Sulfonamide Antibiotics) Angioedema    Clindamycin     Hydrochlorothiazide      Other reaction(s): Abdominal Pain   Slow heart rate    Oxycodone      Other reaction(s): Vomiting    Sulfamethoxazole-Trimethoprim      Other reaction(s): Vomiting, Weakness     Latex Rash       Objective    Physical Exam:   General: alert, no acute distress  HENT: Dry mucous membranes  Eyes: Anicteric sclera  Heart: S1,S2 RRR  Respiratory: Breathing unlabored  GI: No distension  Musculoskeletal: No deformities, no edema  Neuro: AAOx3  Skin: skin tightening on face and b/l fingers    Vitals:  Vitals:    04/14/23 0800   BP: (!) 152/70   Pulse: 84   Resp:    Temp: 36.4 °C (97.6 °F)   SpO2: 96%       Laboratory Studies:  CBC Results       04/13/23 11/25/22 06/27/22 1949 1211 1557    WBC 7.12 8.56 7.99    RBC 3.91 4.09 4.05    HGB 10.6 11.2 11.2    HCT 34.3 35.9 35.8    MCV 87.7 87.8 88.4    MCH 27.1 27.4 27.7    MCHC 30.9 31.2 31.3     252 243        CMP Results       04/14/23 04/13/23 11/25/22     0544 1949 1211     140 139    K 5.0 4.6 4.3    Cl 106 107 107    CO2 24 25 21    Glucose 131 96 92    BUN 15 15 17    Creatinine 1.1 1.0 1.0    Calcium 9.7 9.4 9.7    Anion Gap 9 8 11    AST -- 16 --    ALT -- 9 --    Albumin -- 3.0 --    EGFR >60.0 >60.0 >60.0         Comment for K at 1949 on 04/13/23: Results obtained on plasma. Plasma Potassium values may be up to 0.4 mEQ/L less than " serum values. The differences may be greater for patients with high platelet or white cell counts.        Troponin I Results       04/13/23 04/13/23 06/27/22     2141 1949 1753    HS Troponin I 23.0 16.0 16        ABG Results       04/13/23 1949    Source Of Oxygen nc          I personally reviewed labs. Abnormal labs include:  Albumin 3.0    PO2 53    Imaging and Other Studies:     CT ANGIOGRAPHY CHEST PULMONARY EMBOLISM WITH IV CONTRAST    Result Date: 4/14/2023  IMPRESSION: 1. No CT evidence of pulmonary embolism. 2. Moderate cardiomegaly. 3. A small to moderate pericardial effusion is new from 2020. 4. Centrilobular emphysematous changes and bronchiectatic changes are present throughout the lungs. 5. There are patchy parenchymal infiltrates within the dependent portion of bilateral lower lobes which spares the periphery. Findings raise suspicion for superimposed pneumonia or inflammatory process. There is additional patchy parenchymal infiltrate within the right upper lobe. 6. A 9 mm groundglass opacity is present within the right upper lobe best visualized on axial image 48 of series 3. Follow-up as per Fleischner Society guidelines is advised. 7. Right axillary and mediastinal adenopathy as described above is nonspecific and may be related to the patient's known sarcoidosis. Other causes of lymphadenopathy such as malignancy is not excluded. 8. A new  8.2 mm solid nodule is present within the medial aspect of the left lower lobe. There are additional small nodules throughout both lungs. The possibility of malignancy or metastatic disease is not excluded. If the patient has previous chest CTs from outside institutions, direct comparison would be useful to assess for stability versus interval change. Fleischner Society 2017 Guidelines for Management of Incidentally Detected Pulmonary Nodules in Adults. (Solid Nodules) Nodule size >8 mm (single) Low-Risk Patient - Consider CT, PET/CT or tissue sampling  at 3 months High-Risk Patient - Consider CT, PET/CT or tissue sampling at 3 months Nodule size >8 mm (multiple) Low-Risk Patient - CT at 3-6 months then consider CT at 18-24 months High-Risk Patient - CT at 3-6 months, then at 18-24 months     X-RAY CHEST 1 VIEW    Result Date: 4/13/2023  IMPRESSION: 1. Mild pulmonary vascular congestion suspected. 2. Cardiomegaly. 3. Stable diffuse interstitial prominence, likely chronic.                                                                   Cardiac Imaging    TRANSTHORACIC ECHO (TTE) COMPLETE 08/22/2022    Interpretation Summary  · Normal-sized LV. Normal LV systolic function. Estimated EF 55- 60%. Wall motion appears grossly normal. Mild concentric left ventricular hypertrophy. Grade I LV diastolic dysfunction.  · Pulmonic valve not well visualized. Grossly normal pulmonic valve structure. Trace pulmonic valve regurgitation. No pulmonic valve stenosis.  · Aortic valve not well visualized. Aortic valve not well seen but likely trileaflet. Focal aortic valve calcification present. Sclerotic aortic valve leaflets. Mild aortic valve regurgitation. No aortic valve stenosis.  · RV not well visualized. Normal-sized RV. Normal RV systolic function.  · Normal-sized RA.  · There is a small loculated pericardial effusion anterior to the right atrium. No cardiac tamponade.  · Sclerotic mitral valve. Mitral valve calcification of the anterior leaflet present.  · Trace mitral valve regurgitation.  · No significant mitral valve stenosis.  · Normal-sized IVC. IVC demonstrates normal respiratory collapse.  · Tricuspid valve structure is grossly normal. Mild to moderate tricuspid valve regurgitation.  · Estimated RVSP = 50-55 mmHg.  · Mildly dilated LA.  · No previous echocardiogram available for comparison.       I have personally visualized patient's chest xray dated 4/13/23, no PPM or AICD visualized.     I have independently reviewed the pertinent cardiac studies to the time of  note and agree with reported results    Thank you for the opportunity to participate in this patient's hospital plan of care.     Mireya Hurtado, KARYNA  Office [phone]

## 2023-04-14 NOTE — ASSESSMENT & PLAN NOTE
Hx Systemic Scleroderma / Raynaud's disease per Rheumatology (Dr. Giraldo).  Autoamputation of multiple fingers.     -continue CCB, outpatient follow up

## 2023-04-14 NOTE — ASSESSMENT & PLAN NOTE
- Debility likely multifactorial in the setting of multiple chronic med conditions including scleroderma and CHF  - Resides at home alone. Baseline functional status is independent with ADLs.  - Frailty in the context of muscle atrophy with functional decline, hypoalbuminemia and cachexia (BMI 17.79)  - Recently exacerbated by multiple recent admissions   - Pt remains high risk for further deterioration and functional decline.     Plan:  - Coninue supportive and restorative directed medical therapies per primary team  - PT/OT/SLT as appropriate  - Pt and family would benefit from ongoing information surrounding clinical status and prognostic awareness as available  - See also palliative care  - Pal care will continue to follow while inpatient for support and assistance with complex medical decision making around goals of care

## 2023-04-14 NOTE — ASSESSMENT & PLAN NOTE
Pulmonary Hypertension (WHO Group I)  Occurring in the background of Systemic Scleroderma with ILD.   August 2022 TTE showed normal RV size and function, but progressive exertional decline, worsened DLCO and resting tachycardia  Concern for progression of her pulmonary vascular disease and limitation of cardiac output.   Follows with Dr Arvizu.    - Continue Sildenafil 20 mg TID  - Macitentan prior approval in progress  - She is euvolemic on exam and by echocardiography. Would hold further diuresis  - Dr. Arvizu following
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
## 2023-04-14 NOTE — CONSULTS
"Nutrition Assessment    Recommendations  1) Regular diet, FR per team   2) Boost Plus TID   3) Monitor BMP with Mag/ Phos     Pt meets 2+ criteria for severe malnutrition in the context of chronic illness as evidenced weight loss and and physical exam (moderate muscle loss, mild fat loss)        Clinical Course: Patient is a 62 y.o. female who was admitted on 4/13/2023 with a diagnosis of Cardiomegaly [I51.7]  Pulmonary fibrosis (CMS/HCC) [J84.10]  Pericardial effusion [I31.39].     Past Medical History:   Diagnosis Date    De Quervain's tenosynovitis     Essential (primary) hypertension     Follicular carcinoma of thyroid (CMS/HCC)     Gastro-esophageal reflux     Hand ulceration (CMS/HCC)     Hyperlipidemia, unspecified     Interstitial lung disease (CMS/HCC)     Leg ulcer (CMS/HCC)     Lung nodule     Lupus (CMS/HCC)     Osteomyelitis of hand (CMS/HCC)     Osteoporosis     Pulmonary hypertension (CMS/HCC)     Raynaud's disease     Severe    Reflux esophagitis     Scleroderma (CMS/HCC)     Screening mammogram for breast cancer 05/04/2021    BI-RADS category: 1 - Negative (care everywhere @ [facility])     Past Surgical History:   Procedure Laterality Date    CARDIAC CATHETERIZATION      COLONOSCOPY      HERNIA REPAIR         Reason for Assessment  Reason For Assessment: identified at risk by screening criteria, physician consult     [facility] Nutrition Screen Tool  Has patient lost weight without trying?: 3-->Yes, 24-33 lbs  Has patient been eating poorly due to decreased appetite?: 1-->Yes  [facility] Nutrition Screen Score: 4     Nutrition/Diet History  Intake (%): 100%     Physical Findings  Skin: intact     [id number] Physical Appearance  Skin: intact     Nutrition Order  Nutrition Order: meets nutritional requirements  Nutrition Order Comments: Regular diet     Anthropometrics  Height: 167.6 cm (5' 6\")           Current Weight  Weight Method: Standing scale  Weight: 50 kg (110 lb 3.2 oz)     Ideal Body Weight " (IBW)  Ideal Body Weight (IBW) (kg): 59.58  % Ideal Body Weight: 83.9            Body Mass Index (BMI)  BMI (Calculated): 17.8  Nutritional Status/Malnutrition: Chronic illness - Severe Malnutrition                       Labs/Procedures/Meds  Lab Results Reviewed: reviewed, pertinent     [id number] Lab Results  Lab Results Reviewed: reviewed, pertinent   BMP Results       04/14/23 04/13/23 11/25/22     0544 1949 1211     140 139    K 5.0 4.6 4.3    Cl 106 107 107    CO2 24 25 21    Glucose 131 96 92    BUN 15 15 17    Creatinine 1.1 1.0 1.0    Calcium 9.7 9.4 9.7    Anion Gap 9 8 11    EGFR >60.0 >60.0 >60.0         Comment for K at 1949 on 04/13/23: Results obtained on plasma. Plasma Potassium values may be up to 0.4 mEQ/L less than serum values. The differences may be greater for patients with high platelet or white cell counts.               Medications  Pertinent Medications Reviewed: reviewed, pertinent   Scheduled Meds:   amLODIPine  10 mg oral Daily    cyanocobalamin  1,000 mcg oral Daily    enoxaparin  40 mg subcutaneous Daily (6p)    furosemide  20 mg intravenous Daily    levothyroxine  100 mcg oral Daily    pantoprazole  40 mg oral Daily    sildenafiL (pulm.hypertension)  20 mg oral TID     Continuous Infusions:  PRN Meds:.  glucose **OR** dextrose **OR** glucagon **OR** dextrose 50 % in water (D50)    meclizine      Estimated/Assessed Needs  Additional Documentation: Calorie Requirements (Group), Protein Requirements (Group)     Calorie Requirements  Estimated kCal Needs: Actual Body Weight  Estimated Calorie Need Method: kcal/kg  Calorie/kg Recommended: 30-35  Calorie Recommendations: 8913-6409 kcAL                   Protein Requirements  Recommended Dosing Weight (Estimated Protein Needs): Actual Body Weight  Est Protein Requirement Amount (gms/kg):  (1.5-2)  Protein Recommendations: 75-100g           Fluid requirements: FR per team     Dosing weight: CBW 50 kg             Nutritional  Status/Malnutrition: Chronic illness - Severe Malnutrition  - 10% of weight loss in 6 months   -intake decreased < 50% of meals for months   -severe muscle loss, mild fat loss via NFPE (temporal lobe, quad, shoulder).       Clinical comments: Visited patient for consult/ MST. Patient has lost a significant amount of weight. She was 160lb a few years ago. Per EMR, she has lost 10% of her body weight in the last 6 months. Drinks 3 Ensure / day at home. Likes strawberry and chocolate, ordered. Liberalized diet to assist patient in meeting needs. Takes Vitamin D and B12 at home. No n/v/c, some diarrhea. NKFA.     Goals: intake > 75% of meals, 1-2 ons  Monitor: intake, weight, labs    Recommendations: See above     PES  Statement: PES Statement  Nutrition Diagnosis: Unintended Weight Loss  Related To:: Decreased ability to consume sufficient energy, Increased energy requirements  As Evidenced By:: 10% weight loss, decreased intake                                   Date: 04/14/23  Signature: BECCA Polanco

## 2023-04-14 NOTE — PROGRESS NOTES
Spoke with patient and confirmed with medication list from [facility] and/or patient's own pharmacy to complete the medication reconciliation.     Prior to admission medication list:    Medications Prior to Admission:     amLODIPine (NORVASC) 5 mg tablet, Take 10 mg by mouth every morning.    amLODIPine (NORVASC) 5 mg tablet, Take 5-10 mg by mouth nightly as needed (for high BP or Raynauds phenomenon of the fingers).    diclofenac sodium (VOLTAREN) 1 % topical gel, Apply topically 2 (two) times a day as needed for pain.    mupirocin (BACTROBAN) 2 % ointment, Apply 1 application. topically daily. Behind ears    biotin 1 mg tablet, Take 1,000 mcg by mouth daily.    cholecalciferol, vitamin D3, 1,000 unit (25 mcg) tablet, Take 1,000 Units by mouth daily.    cyanocobalamin, vitamin B-12, 1,000 mcg capsule, Take 1,000 mcg by mouth daily.    docusate sodium (COLACE) 100 mg capsule, Take 100 mg by mouth daily.    levothyroxine (SYNTHROID) 100 mcg tablet, Take 100 mcg by mouth daily.    meclizine (ANTIVERT) 25 mg tablet, Take 25 mg by mouth daily as needed.    sildenafiL, pulm.hypertension, (REVATIO) 20 mg tablet, Take 1 tablet (20 mg total) by mouth 3 (three) times a day.    Comments about home medications:  -Patient states she was taking Eliquis prior to admission, but it was discontinued upon admission.  -Patient states she stopped taking pantoprazole.    Compliance:   -Recent fills on all medications, questionable compliance based on patient interview.

## 2023-04-14 NOTE — PROGRESS NOTES
Patient: Arielle GUAJARDO Isidro  Location:  [address] [address]  MRN:  [id number]  Today's date:  4/14/2023    Arielle is a 62 y.o. female admitted on 4/13/2023 with Cardiomegaly [I51.7]  Pulmonary fibrosis (CMS/HCC) [J84.10]  Pericardial effusion [I31.39]. Principal problem is Dyspnea.    Past Medical History  Arielle has a past medical history of De Quervain's tenosynovitis, Essential (primary) hypertension, Follicular carcinoma of thyroid (CMS/HCC), Gastro-esophageal reflux, Hand ulceration (CMS/HCC), Hyperlipidemia, unspecified, Interstitial lung disease (CMS/HCC), Leg ulcer (CMS/HCC), Lung nodule, Lupus (CMS/HCC), Osteomyelitis of hand (CMS/HCC), Osteoporosis, Pulmonary hypertension (CMS/HCC), Raynaud's disease, Reflux esophagitis, Scleroderma (CMS/HCC), and Screening mammogram for breast cancer (05/04/2021).    History of Present Illness   Adm with chest pain --> demand ischemia; r/o PE; + pleural effusion and cardiomyopathy      PT Vitals    Date/Time Pulse HR Source SpO2 Pt Activity O2 Therapy BP BP Location BP Method Pt Position [facility]   04/14/23 1132 81 Monitor 97 % At rest None (Room air) 144/65 Right upper arm Automatic Lying [facility]   04/14/23 1140 105 Monitor 95 % Other (Comment) with bed mobility None (Room air) -- -- -- -- [facility]      PT Pain    Date/Time Pain Type Location Rating: Rest Rating: Activity Interventions [facility]   04/14/23 1132 Pain Assessment chest 10 10 position adjusted [facility]          Prior Living Environment    Flowsheet Row Most Recent Value   Living Environment Comment lives alone in 2SH (has HHA),  4STE with rail,  stair glide to bed and tub shower on 2nd floor        Prior Level of Function    Flowsheet Row Most Recent Value   Ambulation independent   Transferring assistive person   Toileting assistive equipment   Bathing assistive equipment and person   Dressing assistive person   Driving/Transportation friends/family   Prior Level of Function Comment pt amb without AD,  has  HHA 18 hrs/day 7 days/week --> occ assists with transfers, assist with dressing and bathing, prepares food   Assistive Device/Animal Currently Used at Home stair glide, raised toilet, grab bar, shower chair           PT Evaluation and Treatment - 04/14/23 1131        PT Time Calculation    Start Time 1131     Stop Time 1143     Time Calculation (min) 12 min        General Information    Document Type Initial Evaluation     Mode of Treatment physical therapy     Position at Start of Session supine;in bed     Status at Start of Session agreeable to therapy;no issues or concerns identified by nurse prior to session        Precautions/Limitations/Impairments    Existing Precautions/Restrictions fall        Prior Level of Function    Driving/Transportation friends/family        Cognition/Psychosocial    Affect/Mental Status WFL     Orientation Status oriented x 4     Follows Commands WFL        Sensory    Hearing Status WFL        Vision Assessment/Intervention    Vision Assessment corrective lenses for reading        Sensory Assessment    Sensory Assessment --   pt reports bilat feet neuropathy       Range of Motion (ROM)    Range of Motion ROM is WFL;bilateral lower extremities        Strength Comprehensive (MMT)    Comment BLE strength grossly 4/5 throughout        Bed Mobility    Bed Mobility Activities supine to sit;sit to supine     [place] supervision     Assistive Device bed rails     Comment pt amenable to sitting up in bed - able to complete on own; HR up to 105 with minimal bed mobility; pt politely declining OOB as pt wishes to get washed up - RN made aware        Mobility Belt    Mobility Belt Used for All Out of Bed Activity no     Reason Mobility Belt Not Used --   pt not amenable to OOB at time of PT eval       Bed to Chair Transfer    [place], Bed to Chair not tested        Sit to Stand Transfer    [place], Sit to Stand Transfer not tested        Stand to Sit Transfer    [place],  Stand to Sit Transfer not tested        Gait Training    [place], Gait not tested        Safety Issues    Impairments Affecting Function endurance/activity tolerance   HR up with min activity       Balance    Static Sitting Balance WFL        AM-PAC - Mobility (Current Function)    Turning from your back to your side while in a flat bed without using bedrails? 4 - None     Moving from lying on your back to sitting on the side of a flat bed without using bedrails? 3 - A Little     Moving to and from a bed to a chair? 3 - A Little     Standing up from a chair using your arms? 3 - A Little     To walk in a hospital room? 3 - A Little     Climbing 3-5 steps with a railing? 3 - A Little     AM-PAC Mobility Score 19        Session Outcome    Daily Outcome Statement pt at supervision level for bed mobility; pt politely declining OOB at time of PT eval; HR elevated with minimal bed mobility. pt will benefit from PT while in-house to maximize function, endurance and safety. pt reports she is trying to increase HHA from 18 to 24 hrs/day. Rec home with HHA and home PT upon d/c.     Position at End of Session supine;in bed     Status at End of Session bed alarm on;all needs met;call light in reach;personal items in reach     Nursing Notified change in vital signs;patient's performance;patient's position;patient's response to therapy/activity        Plan    Rehab Potential good, to achieve stated therapy goals     Therapy Frequency 3-5 times/wk     Planned Therapy Interventions balance training;bed mobility training;gait training;patient/family education;stair training;strengthening;transfer training               PT Discharge Recommendations    Flowsheet Row Most Recent Value   PT Recommended Discharge Disposition home with assistance, home with home health at 04/14/2023 1131   Anticipated Equipment Needs at Discharge (PT) none at 04/14/2023 1131   Patient/Family Therapy Goals Statement to return to PLOF at 04/14/2023  1131                  Education Documentation  Joint Mobility/Strength, taught by Xiao Huber, PT at 4/14/2023  2:42 PM.  Learner: Patient  Readiness: Acceptance  Method: Explanation  Response: Verbalizes Understanding  Comment: PT POC, safety with mobility, activity progression, use of call bell for assist          PT Goals    Flowsheet Row Most Recent Value   Bed Mobility Goal 1    Activity/Assistive Device rolling to left, rolling to right, sit to supine, supine to sit at 04/14/2023 1131   [place] independent at 04/14/2023 1131   Time Frame by discharge at 04/14/2023 1131   Progress/Outcome goal ongoing at 04/14/2023 1131   Transfer Goal 1    Activity/Assistive Device sit-to-stand/stand-to-sit, bed-to-chair/chair-to-bed at 04/14/2023 1131   [place] supervision required at 04/14/2023 1131   Time Frame by discharge at 04/14/2023 1131   Progress/Outcome goal ongoing at 04/14/2023 1131   Gait Training Goal 1    Activity/Assistive Device gait (walking locomotion) at 04/14/2023 1131   [place] supervision required at 04/14/2023 1131   Distance 100 at 04/14/2023 1131   Time Frame by discharge at 04/14/2023 1131   Progress/Outcome goal ongoing at 04/14/2023 1131   Stairs Goal 1    Activity/Assistive Device ascending stairs, descending stairs, using handrail, left at 04/14/2023 1131   [place] supervision required at 04/14/2023 1131   Number of Stairs 4 at 04/14/2023 1131   Time Frame by discharge at 04/14/2023 1131   Progress/Outcome goal ongoing at 04/14/2023 1131

## 2023-04-14 NOTE — PROGRESS NOTES
[facility] based on current clinical information.  HC to f/u Monday for dc needs.  No hc referral initiated at this time. If patient dc'd over the w/e pls call [facility] to initiate referral.  Angela Hamm RN [facility] [id number]

## 2023-04-14 NOTE — CONSULTS
Pulmonary Consult Note     Indication for Consultation:    H/o Scleroderma and ILD  Recent PE  Here with worsening hemoptysis and SOB    Requesting Physician:    Dr. Long    Primary Care Physician:  Sara Simmons MD     HISTORY OF PRESENT ILLNESS        This is a 62 year old female with pmhx of  Follicular carcinoma of the thyroid s/p thyroidectomy, PE (3/2023, diagnosed by VQ scan w/ right apical wedge shaped perfusion defect), cutaneous systemic scleroderma, pulmonary hypertension (on sildenafil, Group 1 vs 3), HFpEF, ILD and protein calorie malnutrition (BMI 17.79) who presented late 4/13/2023 with chest pressure, hemoptysis and shortness of breath.     She complained of constant pressure described as someone sitting on her chest, feeling like 10/10 in intensity and not associated with exertion. It was worse when she was deep breathing or laying on her right side. She has shortness of breath at rest, and she is at most able to walk about 20 feet. She has had hemoptysis for years but has been having nose bleeds since her last hospitalization in May during which she was started on eliquis. The quantity of hemoptysis has also increased and she reported expectorating blood clots 3-4 times a day.     When I talked to her she confirmed the story above. She stated she has been frustrated with her pulmonologist so she is waiting to see a new one, this is because he was unable to offer her anything. She states steroids do nothing for her and that she was on them for a year at one point. She also states she does not recall discussing going on an immunosuppressant like cellcept. Her shortness of breath is no different since admission. She continues to have hemoptysis. She did appear comfortable in bed while we were talking.     She is seen at [facility]. She has systemic scleroderma with associated ILD diagnosed in 1999. She was last seen in 3/23/2022. She has been reluctant to start immunosuppressive  medication and from records review it seems she has never been on any agents.     Last PFTs date 3/23/2021  FVC 1.63L (56%), FEV1 1.35L (58%), FEV1/FVC 83%, DLCO 20%    RHC 11/28/2022  RA 3, PCWP 13, PA 60/22/35, CI 3.2, PVR 4.3    Ex-smoker, quit in 2005, approximately 5 PY    Afebrile, HR 1410>85, -140, SpO2 % on RA  Na 139, K 5.0, , Bicarb 24, BUN 15, Cr 1.1  WBC 7.12, Hgb 10.6, Plts 267    hsTrop 16->23  VBG 7.40/42/53  CTA- no large PE, diffuse bilateral hazy ground glass throughout the lung which is more dense in the bilateral lower lobes. NSIP pattern with central consolidation and subpleural sparing. No tracheal debris or bronchial plugging apparent on imaging.     Venous dopplers ([id number])-  Bilateral LE without DVTs    PAST MEDICAL AND SURGICAL HISTORY        Past Medical History:   Diagnosis Date    De Quervain's tenosynovitis     Essential (primary) hypertension     Follicular carcinoma of thyroid (CMS/HCC)     Gastro-esophageal reflux     Hand ulceration (CMS/HCC)     Hyperlipidemia, unspecified     Interstitial lung disease (CMS/HCC)     Leg ulcer (CMS/HCC)     Lung nodule     Lupus (CMS/HCC)     Osteomyelitis of hand (CMS/HCC)     Osteoporosis     Pulmonary hypertension (CMS/HCC)     Raynaud's disease     Severe    Reflux esophagitis     Scleroderma (CMS/HCC)     Screening mammogram for breast cancer 05/04/2021    BI-RADS category: 1 - Negative (care everywhere @ [place])     Past Surgical History:   Procedure Laterality Date    CARDIAC CATHETERIZATION      COLONOSCOPY      HERNIA REPAIR          MEDICATIONS        Home Medications:    amLODIPine, Take 10 mg by mouth daily. Two 5 mg tablets in the AM and 2 tablet (5 mg) at night PRN.    balsam peru-castor oiL, Apply topically 2 (two) times a day.    biotin, Take 1,000 mcg by mouth.    cholecalciferol (vitamin D3), Take 1,000 Units by mouth daily.    clobetasoL, Apply topically 2 (two) times a day for 15  days.    cyanocobalamin (vitamin B-12), Take 1,000 mcg by mouth daily.    docusate sodium, Take 100 mg by mouth daily.    gentamicin, Apply topically daily.    levothyroxine, Take 100 mcg by mouth daily.    meclizine HCl (MECLIZINE ORAL), Take by mouth.    pantoprazole, Take 40 mg by mouth daily.    sildenafiL (pulm.hypertension), Take 1 tablet (20 mg total) by mouth 3 (three) times a day.    SALINE WOUND WASH, 1 application daily. Use to cleanse your wounds daily    Current Medications:   amLODIPine  10 mg oral Daily    cyanocobalamin  1,000 mcg oral Daily    enoxaparin  40 mg subcutaneous Daily (6p)    furosemide  20 mg intravenous Daily    levothyroxine  100 mcg oral Daily    pantoprazole  40 mg oral Daily    sildenafiL (pulm.hypertension)  20 mg oral TID         ALLERGIES        Aspirin, Ibuprofen, Iodine, Iodine and iodide containing products, Penicillins, Sulfa (sulfonamide antibiotics), Clindamycin, Hydrochlorothiazide, Oxycodone, Sulfamethoxazole-trimethoprim, and Latex    FAMILY HISTORY        Family History   Problem Relation Age of Onset    Heart disease Biological Brother         stent    Breast cancer Niece     Cervical cancer Neg Hx     Uterine cancer Neg Hx     Colon cancer Neg Hx     Ovarian cancer Neg Hx        SOCIAL HISTORY        Social History     Socioeconomic History    Marital status:    Tobacco Use    Smoking status: Former     Types: Cigarettes     Quit date: 9/15/2005     Years since quittin.5    Smokeless tobacco: Never    Tobacco comments:     Would smoke 1/2 of 1/2 PPD, quit in    Vaping Use    Vaping status: Never Used   Substance and Sexual Activity    Alcohol use: Never    Drug use: Never    Sexual activity: Not Currently     Birth control/protection: Post-menopausal     Social Determinants of Health     Food Insecurity: No Food Insecurity (2023)    Hunger Vital Sign     Worried About Running Out of Food in the Last Year: Never true  "    Ran Out of Food in the Last Year: Never true       REVIEW OF SYSTEMS        A Full 10 point review of systems was obtained and is negative then otherwise stated in the HPI    PHYSICAL EXAMINATION      Vital Signs:   Visit Vitals  BP (!) 149/68 (BP Location: Left upper arm, Patient Position: Lying)   Pulse 85   Temp 36.4 °C (97.5 °F) (Oral)   Resp 19   Ht 1.676 m (5' 6\")   Wt 50 kg (110 lb 3.2 oz)   LMP  (LMP Unknown)   SpO2 94%   BMI 17.79 kg/m²       I/O's:    Intake/Output Summary (Last 24 hours) at 4/14/2023 0734  Last data filed at 4/14/2023 0400  Gross per 24 hour   Intake 118 ml   Output 200 ml   Net -82 ml       General: The patient is in no acute distress.  Resting comfortably in bed. Vitiligo across forehead,   HEENT: Mucous membranes are moist.  Sclera are anicteric.  Neck: Supple.  No cervical lymphadenopathy  Cardiovascular: S1 and S2 are present.  There are no murmurs.  Lungs: soft bibasilar crackles, no respiratory distress  Abdomen: Soft, nontender and nondistended  Extremities: Digits are atrophied, hardened, loss of flexibility at DIP and PIP on almost all the fingers, skin very hardened, nail beds are atrophied.   Neuro: Awake and alert.  Spontaneously moving all extremities    Diagnostic Data      Labs:    I have personally reviewed all pertinent patient laboratory results. Labs of note discussed below:    ABG Results       04/13/23 1949    Source Of Oxygen nc        BMP Results       04/13/23 11/25/22 06/27/22 1949 1211 1557     139 138    K 4.6 4.3 4.8    Cl 107 107 107    CO2 25 21 23    Glucose 96 92 86    BUN 15 17 14    Creatinine 1.0 1.0 1.0    Calcium 9.4 9.7 9.1    Anion Gap 8 11 8    EGFR >60.0 >60.0 >60.0         Comment for K at 1949 on 04/13/23: Results obtained on plasma. Plasma Potassium values may be up to 0.4 mEQ/L less than serum values. The differences may be greater for patients with high platelet or white cell counts.    Comment for K at 1557 on 06/27/22: " Results obtained on plasma. Plasma Potassium values may be up to 0.4 mEQ/L less than serum values. The differences may be greater for patients with high platelet or white cell counts.  MODERATE HEMOLYSIS, RESULT MAY BE INCREASED.        CBC Results       04/13/23 11/25/22 06/27/22     1949 1211 1557    WBC 7.12 8.56 7.99    RBC 3.91 4.09 4.05    HGB 10.6 11.2 11.2    HCT 34.3 35.9 35.8    MCV 87.7 87.8 88.4    MCH 27.1 27.4 27.7    MCHC 30.9 31.2 31.3     252 243            Micro:   Microbiology Results     Procedure Component Value Units Date/Time    SARS-CoV-2 (COVID-19), PCR Nasopharynx [[id number]]  (Normal) Collected: 04/13/23 2142    Specimen: Nasopharyngeal Swab from Nasopharynx Updated: 04/13/23 2304    Narrative:      The following orders were created for panel order SARS-CoV-2 (COVID-19), PCR Nasopharynx.  Procedure                               Abnormality         Status                     ---------                               -----------         ------                     SARS-COV-2 (COVID-19)/ F...[[id number]]  Normal              Final result                 Please view results for these tests on the individual orders.    SARS-COV-2 (COVID-19)/ FLU A/B, AND RSV, PCR Nasopharynx [[id number]]  (Normal) Collected: 04/13/23 2142    Specimen: Nasopharyngeal Swab from Nasopharynx Updated: 04/13/23 2304     SARS-CoV-2 (COVID-19) Negative     Influenza A Negative     Influenza B Negative     Respiratory Syncytial Virus Negative    Narrative:      Testing performed using real-time PCR for detection of COVID-19. EUA approved validation studies performed on site.             Imaging:    I have reviewed all pertinent imaging which is discussed below:    See hpi        ASSESSMENT AND RECOMMENDATIONS           This is a 62 year old female with pmhx of  Follicular carcinoma of the thyroid s/p thyroidectomy, PE (3/2023, diagnosed by VQ scan w/ right apical wedge shaped perfusion defect), cutaneous systemic  scleroderma, pulmonary hypertension (on sildenafil, Group 1 vs 3), HFpEF, ILD and protein calorie malnutrition (BMI 17.79) who presented late 4/13/2023 with chest pressure, hemoptysis and shortness of breath.     1. Scleroderma associated ILD  - NSIP pattern on CT, not on chronic immunosuppression.   - w/ associated moderately severe restrictive lung disease on last PFts  2. Hemoptysis  - chronic but more increased in severity with initiation of AC after PE in 03/2023  3. Recent PE  - subsegmental, without evidence of right heart strain, negative bilateral LE dopplers  4. Pulmonary Hypertension  - in setting of Ssc-ILD, non group 2. On Sildenafil    Recommendations:    - agree with prophylactic dose anticoagulation for now. Would repeat bilateral lower extremity dopplers. Would not do an IVC filter presently.   - Monitoring off abx presently  - She may benefit from initiation of immunosuppression while in patient for scleroderma ILD which may be the main disease process at play here. She is against starting on steroids. Her outpatient pulmonologist seems to have strongly recommended cellcept and the patient seems to be open to this but I would be reluctant to start it inpatient since we do not expect to see it to take effect while she is here and she will be followed by someone else.   - Please check ESR/CRP  - diuresis per primary team and cardiology- does not appear to be the driving factor behind decompensation  - chest pain evaluation by primary team and cardiology    Thank you for the consultation. We will continue to follow along closely with you.    Please page [phone] with any questions/concerns.       Marci Goldberg MD  Pulmonary/Critical Care Medicine Fellow  PGY-5  4/14/2023  7:34 AM

## 2023-04-14 NOTE — ACP (ADVANCE CARE PLANNING)
"Palliative Care Advance Care Planning Note      Patient Name: Arielle GUAJARDO Isidro                                                                                        Patient MRN: [id number]     Discussion Date: 04/14/23   Discussion Participants: Patient   Start time: 1115  End time: 1145    Today participants wished to discuss Advance Care Planning. The following summarizes our discussion.    During our visit today, we discussed:     Code Status  Full Code      I counseled patient regarding code status and the benefits and burdens of CPR and mechanical ventilation in the setting of current clinical condition and co-morbidities.  Discussed code status options and limitations of resuscitation, including likelihood of significant debility if successful. She would want full code but she would not want to be kept alive long term or on a vent long term. After a few weeks she would want to be \"let go\". She would never want to be in a nursing home long term      Health Care Agent/Surrogate Decision Maker   Patient's 2 daughters: Tiffanie and Rubens      Medical Status/Prognosis  Variable Dependent on Goals of Care       Goals of Care    Restorative    I spoke with patient regarding her goals of care. Clinical condition reviewed. Patient has general insight into her clinical status. She expressed some frustration that her medical team often attributes her problems to her Lupus/scerloderma, which she perceives as due to uncertainty of the etiology. Counseled patient about the progressive nature of her condition and that there are multiple causes for her medical problems as her case is complex. She was very appreciative of the counseling given to her by Dr. Arvizu from Cardiology. She remains independent and her goals are restorative, including full code.   She sees it as being in Gods hands. She does tell me she would not want a prolonged course on a ventilator or  in the ICU. She would want to be \"let go\". She shared " that a very dear friend recently  and was reflective on her friendship with him and cites her mary as helping her through. She also prides herself as providing a matriarch role in her family for her children and grandchildren.         Patient Capacity  Patient has capacity to make their own medical decisions.    Plan  Patient has had home care through [facility] and said she's hoping to continue.      Attestation Statement:  I have spent a total of 30 minutes in face-to-face discussion of patient advance care planning with the patient and/or surrogate decision makers.  No active management of the patients problem(s) was undertaken during the time period reported      KARYNA Preston  2023 11:51 AM

## 2023-04-14 NOTE — ASSESSMENT & PLAN NOTE
hospitalized on 3/19/2023 at [facility] for shortness of breath and was diagnosed with a PE on VQ scan showing Right apical wedge shaped perfusion defect. She had negative upper and lower extremity US.  No evidence of PE on CTA.     - hold apixban  - pulm consulted, aj recs

## 2023-04-14 NOTE — ASSESSMENT & PLAN NOTE
Reports up to 30 pounds weight loss over the past 6 months.  Severe Protein-Calorie Malnutrition    -Consult to nutrition  -Need age-appropriate cancer screening with discharge

## 2023-04-14 NOTE — ASSESSMENT & PLAN NOTE
Hemoglobin of 10.6 from baseline of 11-12.  In the setting of worsening baseline hemoptysis.  MCV of 88.  Iron and iron sat low (29 and 12 respectively). B12 normal     - Start iron supp  -Hold apixaban

## 2023-04-14 NOTE — PLAN OF CARE
Problem: Adult Inpatient Plan of Care  Goal: Plan of Care Review  Outcome: Progressing    RD recs in note

## 2023-04-14 NOTE — PROGRESS NOTES
Internal Medicine  Daily Progress Note       SUBJECTIVE   This is a 62 y.o. year-old female admitted on 4/13/2023 with Cardiomegaly [I51.7]  Pulmonary fibrosis (CMS/HCC) [J84.10]  Pericardial effusion [I31.39].    Interval History: Patient continued to have chest pressure overnight, no shortness of breath or new concerns. Patient seen and examined at bedside this morning. She states that she continues to have chest pressure (unchanged in nature from chronic chest pressure). Denies any further hemoptysis.     OBJECTIVE   Vital signs in last 24 hours:  Temp:  [36.4 °C (97.5 °F)-36.5 °C (97.7 °F)] 36.4 °C (97.6 °F)  Heart Rate:  [] 84  Resp:  [19-24] 19  BP: (140-156)/(68-80) 152/70  SpO2:  [94 %-100 %] 96 %  Oxygen Therapy: None (Room air)    Weight & I/Os:  Weights (last 5 days)     Date/Time Weight    04/14/23 0447 50 kg (110 lb 3.2 oz)    04/14/23 0300 50 kg (110 lb 3.2 oz)    04/13/23 18:04:29 51.3 kg (113 lb)          Intake/Output Summary (Last 24 hours) at 4/14/2023 0659  Last data filed at 4/14/2023 0400  24 Hour Net Input/Output from 7AM Yesterday   Intake 118 ml   Output 200 ml   Net -82 ml        PHYSICAL EXAMINATION     Physical Exam  Vitals reviewed.   Constitutional:       Appearance: Normal appearance. She is ill-appearing (chronically).  HENT:      Head: Normocephalic and atraumatic.   Neck:      Vascular: JVD present.   Cardiovascular:      Rate and Rhythm: Normal rate and regular rhythm.      Pulses: Normal pulses.      Heart sounds: Normal heart sounds.      Comments: Trace b/l LE edema, up to ankles   Pulmonary:      Effort: Pulmonary effort is normal.      Breath sounds: Rales present.   Abdominal:      General: Abdomen is flat. Bowel sounds are normal. There is no distension.      Palpations: Abdomen is soft.      Tenderness: There is no guarding or rebound.   Musculoskeletal:      Comments: Multiple amputated fingers    Skin:     General: Skin is warm.      Comments: Vitiligo noted on  forehead    Neurological:      General: No focal deficit present.      Mental Status: She is alert and oriented to person, place, and time. Mental status is at baseline.      Cranial Nerves: No cranial nerve deficit.      Sensory: No sensory deficit.      Motor: No weakness.   Psychiatric:         Mood and Affect: Mood normal.         Behavior: Behavior normal.         Thought Content: Thought content normal.      LINES, CATHETERS, DRAINS, AIRWAYS, & WOUNDS   Lines, drains, airways, & wounds:  Peripheral IV (Adult) 04/13/23 Anterior;Left;Proximal Forearm (Active)   Number of days: 1        LABS, IMAGING, & TELE   Labs:  I have reviewed the patient's labs to the time of note. No new clinical concern.    Results from last 7 days   Lab Units 04/13/23  1949   WBC K/uL 7.12   HEMOGLOBIN g/dL 10.6*   HEMATOCRIT % 34.3*   PLATELETS K/uL 267       Results from last 7 days   Lab Units 04/14/23  0544 04/13/23  1949   SODIUM mEQ/L 139 140   POTASSIUM mEQ/L 5.0 4.6   CHLORIDE mEQ/L 106 107   CO2 mEQ/L 24 25   BUN mg/dL 15 15   CREATININE mg/dL 1.1 1.0   CALCIUM mg/dL 9.7 9.4   ALBUMIN g/dL  --  3.0*   BILIRUBIN TOTAL mg/dL  --  0.2*   ALK PHOS IU/L  --  78   ALT IU/L  --  9*   AST IU/L  --  16   GLUCOSE mg/dL 131* 96     Imaging personally reviewed (does not include unread studies):  CT ANGIOGRAPHY CHEST PULMONARY EMBOLISM WITH IV CONTRAST    Result Date: 4/14/2023  CLINICAL HISTORY:Shortness of breath, chest pressure and hemoptysis. The patient reports she was diagnosed on March 19, 2023 at [facility] with a pulmonary embolism and was placed on the lymph node place.. History of scleroderma. COMMENT:Helical CT angiography of the chest was performed on April 13, 2023 during the administration of 80 cc of Omnipaque 350 contrast intravenously. Thin section axial images were acquired during the pulmonary arterial phase contrast bolus administration. Postprocessing was performed and maximum intensity projection  reformations were created and reviewed. There are no previous chest CTs for comparison. Comparison is made to an abdominal CT from November 27, 2020. CT DOSE:  One or more dose reduction techniques (e.g. automated exposure control, adjustment of the mA and/or kV according to patient size, use of iterative reconstruction technique) utilized for this examination. No filling defects are demonstrated throughout the pulmonary arterial tree and the study is negative for pulmonary embolism. There is moderate cardiomegaly. A small to moderate pericardial effusion is new from 2020. The pulmonary arteries are enlarged consistent with underlying pulmonary hypertension. The thoracic aorta is patent, normal in caliber and without dissection. There are centrilobular emphysematous changes present throughout the lungs. There are mild bronchiectatic changes noted bilaterally probably within the lower lobes. Patchy parenchymal consolidation within bilateral lower lobes which spares the periphery is suspicious for underlying pneumonia and/or inflammatory process. Underlying pneumonia is not entirely excluded. There is additional patchy parenchymal density within the right upper lobe which is nonspecific and may be infectious or inflammatory in nature. A 9 mm groundglass opacity is present within the right upper lobe on image 48 of series 3. This is entirely nonspecific. Follow-up as per Fleischner Society guidelines is advised. Patchy parenchymal density within the periphery of the right upper lobe on axial images 28-31. This probably reflects scarring. Follow-up imaging to be obtained to assess for stability. There is a nonspecific 8.2 mm solid nodule within the subpleural region of the medial aspect of the left lower lobe best visualized on axial image 111 of series 3. This was is not present on previous abdominal CT performed November 27, 2020. There are additional small (less than 5 mm) nodules scattered throughout the lungs which  are nonspecific as well. The possibility of malignancy is not excluded. Follow-up as per Fleischner Society guidelines is advised. The visualized lower neck is unremarkable. There are prominent right axillary lymph nodes measuring up to 19 mm. There are additional prominent prevascular, pretracheal and bilateral hilar lymph nodes which are nonspecific. These may be related to the patient's known scleroderma. Malignant adenopathy is not excluded. The esophagus is distended and fluid-filled which is likely related to the patient's scleroderma. A distal esophageal stricture or lesion is not excluded on the basis exam. Scans through the upper abdomen demonstrate the visualized upper abdominal organs to be unremarkable. The visualized skeleton is unremarkable. A concordant preliminary report was rendered by Dr. Ramos on April 13, 2023 at 11:16 PM. Actionable Finding: Follow up should be considered.     IMPRESSION: 1. No CT evidence of pulmonary embolism. 2. Moderate cardiomegaly. 3. A small to moderate pericardial effusion is new from 2020. 4. Centrilobular emphysematous changes and bronchiectatic changes are present throughout the lungs. 5. There are patchy parenchymal infiltrates within the dependent portion of bilateral lower lobes which spares the periphery. Findings raise suspicion for superimposed pneumonia or inflammatory process. There is additional patchy parenchymal infiltrate within the right upper lobe. 6. A 9 mm groundglass opacity is present within the right upper lobe best visualized on axial image 48 of series 3. Follow-up as per Fleischner Society guidelines is advised. 7. Right axillary and mediastinal adenopathy as described above is nonspecific and may be related to the patient's known sarcoidosis. Other causes of lymphadenopathy such as malignancy is not excluded. 8. A new  8.2 mm solid nodule is present within the medial aspect of the left lower lobe. There are additional small nodules throughout  both lungs. The possibility of malignancy or metastatic disease is not excluded. If the patient has previous chest CTs from outside institutions, direct comparison would be useful to assess for stability versus interval change. Fleischner Society 2017 Guidelines for Management of Incidentally Detected Pulmonary Nodules in Adults. (Solid Nodules) Nodule size >8 mm (single) Low-Risk Patient - Consider CT, PET/CT or tissue sampling at 3 months High-Risk Patient - Consider CT, PET/CT or tissue sampling at 3 months Nodule size >8 mm (multiple) Low-Risk Patient - CT at 3-6 months then consider CT at 18-24 months High-Risk Patient - CT at 3-6 months, then at 18-24 months     X-RAY CHEST 1 VIEW    Result Date: 4/13/2023  CLINICAL HISTORY: Shortness of breath. COMMENT: Portable AP erect chest radiograph was obtained. Comparison is made with prior studies including most recent chest radiograph dated 6/27/2022 and a CT of the abdomen and pelvis dated 11/27/2020. Cardiac silhouette is enlarged and unchanged. Hilar and mediastinal contours are stable. Mild pulmonary vascular congestion is suspected when compared with to the most recent prior study. There is unchanged diffuse interstitial prominence particularly at the lung bases which has been described previously and is probably chronic. No evidence of pneumothorax or appreciable pleural effusion. Regional osseous structures are grossly intact.     IMPRESSION: 1. Mild pulmonary vascular congestion suspected. 2. Cardiomegaly. 3. Stable diffuse interstitial prominence, likely chronic.    Telemetry/EKG:  I have independently reviewed the telemetry. No events for the last 24 hours.     ASSESSMENT & PLAN   Hemoptysis  Assessment & Plan  Pt with history of hemoptysis at baseline but appears to be somewhat increased now on AC for PE    -hold apixaban, might not need to restart given VQ scan iso of ILD  -Trend Hgb and transfuse as needed  -pulm consulted, aj recs    *  Dyspnea  Assessment & Plan  Has history of advanced scleroderma complicated by ILD and pulmonary hypertension.  Has a chronic baseline nonproductive cough, chronic hemoptysis and shortness of breath.  Presenting with acute on chronic hemoptysis, worsening shortness of breath and chest pain.  No evidence of ACS.  Recent hospitalization at [facility] for similar symptoms, VQ scan was positive for PE (in the setting of her lung disease), she was sent home on apixaban.  Had no improvement in symptoms.  CTA at admission with no evidence of PE, moderate pericardial effusion, groundglass opacities mainly in the bases.  Resting tremor slightly worse.    Likely multifactorial etiology related to slight volume overload in the setting of chronic lung disease and CVA deconditioning/debility.  -Continue diuresis with IV furosemide 20 mg daily  -CHF order set  -Recheck TTE, evaluate pericardial effusion and pulmonary hypertension, cards consulted, appreciate recs  -Hold apixaban given worsening hemoptysis and nosebleeds, questionable PE on the previous VQ scan done at [facility], pulmonary consulted, appreciate recs, will discuss the need of apixaban moving forward  -Received on 25 of IV Solu-Medrol for contrast prep in the ED, hold off further steroids for now  -PT/OT consulted, palliative team consulted    Anemia  Assessment & Plan  Hemoglobin of 10.6 from baseline of 11-12.  In the setting of worsening baseline hemoptysis.  MCV of 88.  Iron and iron sat low (29 and 12 respectively). B12 normal     - Start iron supp  -Hold apixaban      Acute on chronic heart failure with preserved ejection fraction (CMS/HCC)  Assessment & Plan  Continue diuresis.  CHF order set.  Consult cardiology.    Weight loss  Assessment & Plan  Reports up to 30 pounds weight loss over the past 6 months.    -Consult to nutrition  -Need age-appropriate cancer screening with discharge    Debility  Assessment & Plan  Pt/ot consulted.     Vertigo  Assessment &  Plan  Continue meclizine as needed     History of pulmonary embolism  Assessment & Plan  hospitalized on 3/19/2023 at [facility] for shortness of breath and was diagnosed with a PE on VQ scan showing Right apical wedge shaped perfusion defect. She had negative upper and lower extremity US.  No evidence of PE on CTA.     - hold apixban  - pulm consulted, aj recs    Pericardial effusion  Assessment & Plan  New compared to prior imaging  Unclear etiology    -Assess further with Echocardiogram    Pulmonary hypertension (CMS/HCC)  Assessment & Plan  Pulmonary Hypertension (WHO Group I)  Occurring in the background of Systemic Scleroderma with ILD.   August 2022 TTE showed normal RV size and function, but progressive exertional decline, worsened DLCO and resting tachycardia  Concern for progression of her pulmonary vascular disease and limitation of cardiac output.   Follows with Dr Arvizu.    -Continue with sildenafil  -cards consulted, consider cardiac cath       Raynaud's disease  Assessment & Plan  Hx Systemic Scleroderma / Raynaud's disease per Rheumatology (Dr. Giraldo).  Autoamputation of multiple fingers.     -continue CCB, outpatient follow up    Essential (primary) hypertension  Assessment & Plan  Continue Amlodipine.       VTE Assessment: Padua    VTE Prophylaxis: Current anticoagulants:  enoxaparin (LOVENOX) syringe 40 mg, subcutaneous, Daily (6p)      Code Status: Full Code  Estimated discharge date: 4/16/2023     ATTENDING DOCUMENTATION  ALSO SEE ATTENDING ATTESTATION SECTION OF NOTE

## 2023-04-14 NOTE — PLAN OF CARE
Problem: Adult Inpatient Plan of Care  Goal: Plan of Care Review  Outcome: Progressing  Flowsheets (Taken 4/14/2023 1609)  Progress: no change  Plan of Care Reviewed With: patient  Outcome Summary: Pt admitted to  for chest pain. CTA negative for PE and revealed pleural effusions and cardiomyopathy. All VSS. Pt remains on RA satting 95%. Pt rates chest pain 10/10, no sob reported. Hourly rounding maintained  Goal: Patient-Specific Goal (Individualized)  Outcome: Progressing

## 2023-04-14 NOTE — ASSESSMENT & PLAN NOTE
Pt with history of hemoptysis at baseline but appears to be somewhat increased now on AC for PE    -hold apixaban, no need to restart given VQ scan iso of ILD  -Trend Hgb and transfuse as needed  -pulm following

## 2023-04-14 NOTE — HOSPITAL COURSE
Arielle is a 62 y.o. female admitted on 4/13/2023 with Cardiomegaly [I51.7]  Pulmonary fibrosis (CMS/HCC) [J84.10]  Pericardial effusion [I31.39]. Principal problem is Dyspnea.    Past Medical History  Arielle has a past medical history of De Quervain's tenosynovitis, Essential (primary) hypertension, Follicular carcinoma of thyroid (CMS/HCC), Gastro-esophageal reflux, Hand ulceration (CMS/HCC), Hyperlipidemia, unspecified, Interstitial lung disease (CMS/HCC), Leg ulcer (CMS/HCC), Lung nodule, Lupus (CMS/HCC), Osteomyelitis of hand (CMS/HCC), Osteoporosis, Pulmonary hypertension (CMS/HCC), Raynaud's disease, Reflux esophagitis, Scleroderma (CMS/HCC), and Screening mammogram for breast cancer (05/04/2021).    History of Present Illness   Adm with chest pain --> demand ischemia, most likely related to scleroderma associated ILD, PAH, hemoptysis; + pleural effusion and cardiomyopathy  -US neg DVT, CT chest without PE.

## 2023-04-14 NOTE — PLAN OF CARE
Care Coordination Admission Assessment Note  Date: 4/14/2023   Time: 3:21 PM    Patient Name: Arielle Felix  Medical Record Number: [id number]  YOB: 1960  Room/BED: 0348D    General Information  Patient-Specific Goals (Include Timeframe)     PCP: Sara Simmons MD   Insurance Coverage: [employer]  Readmission Within the last 30 days       Advance Directives (for Healthcare)?  Does patient have advance directive?: No  Patient does not have Advance Directive: Patient/Family declines further information  Does patient have current OOH DNR form?: No  Patient does not have current OOH DNR form: Patient/Family declines further information  Does patient have current POLST?: No  Patient does not have current POLST: Patient/Family declines further information  Does patient have mental health advance directive?: No  Patient does not have Mental Health Advance Directive: Patient/Family declines further information        Information       Living Arrangements  Arrived From: home  Current Living Arrangements: home  People in Home: alone  Home Accessibility: stairs to enter home (Group), stairs within home (Group)  Living Arrangement Comments: pt lives alone in 2 story home, 4 LARRY, stairglide inside, bed/bathroom upstairs.  Able to Return to Prior Arrangements: yes    Housing Stability and Financial Resources (SDOH)  In the last 12 months, was there a time when you were not able to pay the mortgage or rent on time?: No  In the last 12 months, was there a time when you did not have a steady place to sleep or slept in a shelter (including now)?: No    Current Outpatient/Agency/Support Group       Type of Current Home Care Services  home health aide    Assistive Device/Animal Currently Used at Home  none    Functional Status Comments       IADL Comments       Employment/ Status       Concerns to be Addressed  discharge planning, care coordination/care conferences    Current Discharge  Risk       Anticipated Changes Related to Illness  none    Transportation Concerns (SDOH)  Transportation Anticipated: family or friend will provide  In the past 12 months, has lack of transportation kept you from medical appointments or from getting medications?: No  In the past 12 months, has lack of transportation kept you from meetings, work, or from getting things needed for daily living?: No    Concerns Comments  s/w dtr to complete A, pt has aide 18 hour a day through [employer], requested 24/7. vac +, has scale, agreeable with [facility], pt/ot pending    Anticipated Clinical Needs       Anticipated Discharge Plan   Anticipated Discharge Disposition: home with home health  Type of Home Care Services: home health aide, nursing, home PT, home OT  Patient/Family Anticipates Transition to: home with help/services  Met with patient. Provided education and contact information for Care Coordination services.: yes  Screening complete: yes

## 2023-04-14 NOTE — CONSULTS
Heart Failure / Pulmonary Hypertension Cardiology Consult Note    Ms. Felix is a 62 y.o. female with a past medical history of Pulmonary HTN, HTN, Raynaud's Disease, Cutaneous Systemic Scleroderma (dx 1998), ILD, PE (3/2023). She is a patient of Dr. Nguyễn. She was previously followed by Dr. Jos Valdez at [facility]. She had stress test 8/06/2013 was negative for ischemia. Echocardiogram from 4/21/2022 showed LVEF: 55-60%, mild aortic valve thickening, pulmonary artery systolic pressure: 52 mmHg and trivial pericardial effusion. LHC and RHC (separate occasions) over the last 5-10 years which showed normal hemodynamics and normal coronaries. She had a RHC 9/02/2020 showing top-normal right sided filling pressures with mild pulmonary hypertension, top-normal PVR and normal pulmonary capillary wedge pressure. The cardiac index was normal, seen below.     She has previously followed up with Dr. Judd Flesch at [facility]. PFTs 3/18/2020. Last OV 3/23/22. CT chest and echo ordered. She reports that she will be initiating care with Dr. Pratibha Abbott. She was to follow up with Dr. Giraldo of Rheumatology.     On 6/27/2022, she was in [facility] ER for chest pain. EKG: NSR without ischemic changes. hsTrop: 12->16, d-dimer: 0.56, CXR showed cardiomegaly and diffuse interstitial prominence. She was discharged home and advised to follow up with cardiology.     Dr. Nguyễn ordered echocardiogram, which was completed on 8/22/2022 and showed estimated RVSP = 50-55 mmHg, normal-sized LV with normal LV systolic function. Estimated EF 55- 60%. Wall motion grossly normal, mild concentric left ventricular hypertrophy, grade I LV diastolic dysfunction, probably normal RV size and function.    At her initial visit on 10/11/2022, she reported that she felt very short of breath with minimal activity most of the time. She reported that this started about 1 year prior and had progressively gotten worse. She described PND and bendopnea.  "She reported that she experienced ankle swelling, worsened over the prior year and usually worse towards the end of the night. She also reported some swelling in her abdomen. She reported daily palpations. At her initial visit I recommended a RHC which was done 11/28/2022 and showed: Normal right sided filling pressures. Mildly elevated left sided filling pressures. Precapillary pulmonary hypertension with mean PA 36 mmHg. PVR: 4.3 Wood units. She was initiated on Sildenafil 20 mg TID.     At her office visit earlier in April, she reported that she was hospitalized on 3/19/2023 at [facility] for PE diagnosed on V/Q scan. She reported that she had presented with left arm pain and heaviness. She stated that she was started on Apixaban. She described on-going fatigue, shortness of breath and chest pain, which were about the same as her baseline prior to her hospitalization. She noted that she coughed up blood with clots every day. She stated that this has been going on for about a year, but she had worsened episodes since starting on Apixaban. She reports on-going Vertigo, which improved with Meclizine. She denied light-headedness, syncope or near syncope. She reportd that she fell and hit her head about 4-5 months ago (before she was on blood thinner). She reported a constant pressure at her left sternum. She reports that was a pressure (\"not burning or sharp\"). She denied any radiation or change with exertion. She could not identify any aggravating or relieving factors, but stated that the sensation was constant. She reported intermittent shortness of breath with rest. She reported needing to take a break after 4-5 steps of exertion. She described occasional swelling of her feet but not ankles or legs. She denied abdominal distension. She reported sleeping on 2-3 pillows at night. She reported that she could wake up short of breath with cough at times.     She now presents back with worsened chest " "pains and worsened nose bleeds / coughing up blood. She reports that both are worsened from her recent office visit. She denies changes in breathing. She denies light-headedness.     ---  Rheumatology: Enzo ([facility])  Pulmonology: Flesch -> Pratibha Abbott ([facility] - upcoming appointment)     Past Medical / Surgical History:  Pulmonary HTN, HTN, Raynaud's Disease, Cutaneous Systemic Scleroderma (dx 1998), ILD, PE (3/2023), Follicular Carcinoma of Thyroid, Tenosynovitis, de Quervain, h/o Hernia Repair, h/o Appendicitis, h/o Digit Amputation, H/o Total Thyroidectomy (Dr. Pacheco at [facility]; 4/2013)    Family & Social History: She is , she has two children. She works as an .     Tobacco: former 2005  EtOH: never   Illicits: never     Her brother has CAD with stent  1st cousin with SLE  Both parents had \"heart problems\"    Allergies:   Allergies   Allergen Reactions    Aspirin Shortness of breath     Other reaction(s): Unknown  \"stop breathing\"      Ibuprofen Shortness of breath     Stop breathing    Iodine Shortness of breath     Other reaction(s): Unknown    Iodine And Iodide Containing Products Shortness of breath     ? rash    Penicillins Shortness of breath     Other reaction(s): Unknown    Sulfa (Sulfonamide Antibiotics) Angioedema    Clindamycin     Hydrochlorothiazide      Other reaction(s): Abdominal Pain   Slow heart rate    Oxycodone      Other reaction(s): Vomiting    Sulfamethoxazole-Trimethoprim      Other reaction(s): Vomiting, Weakness     Latex Rash       Medications:   Current Facility-Administered Medications   Medication Dose Route Frequency Provider Last Rate Last Admin    acetaminophen (TYLENOL) tablet 650 mg  650 mg oral q6h PRN Gonzalo Wise MD   650 mg at 04/14/23 1723    amLODIPine (NORVASC) tablet 10 mg  10 mg oral Daily Rayray Martinez MD   10 mg at 04/14/23 0837    cyanocobalamin (VITAMIN B12) tablet 1,000 mcg  1,000 mcg oral Daily Rayray Martinez MD   " 1,000 mcg at 04/14/23 0837    glucose chewable tablet 16-32 g of dextrose  16-32 g of dextrose oral PRN Rayray Martinez MD        Or    dextrose 40 % oral gel 15-30 g of dextrose  15-30 g of dextrose oral PRN Rayray Martinez MD        Or    glucagon (GLUCAGEN) injection 1 mg  1 mg intramuscular PRN Rayray Martinez MD        Or    dextrose 50 % in water (D50) injection 12.5 g  25 mL intravenous PRN Rayray Martinez MD        enoxaparin (LOVENOX) syringe 40 mg  40 mg subcutaneous Daily (6p) Rayray Martinez MD   40 mg at 04/14/23 1644    furosemide (LASIX) injection 20 mg  20 mg intravenous Daily Rayray Martinez MD   20 mg at 04/14/23 0837    levothyroxine (SYNTHROID) tablet 100 mcg  100 mcg oral Daily Rayray Martinez MD   100 mcg at 04/14/23 0837    meclizine (ANTIVERT) tablet 12.5 mg  12.5 mg oral 3x daily PRN Rayray Martinez MD   12.5 mg at 04/14/23 1015    pantoprazole (PROTONIX) tablet,delayed release (DR/EC) 40 mg  40 mg oral Daily Rayray Martinez MD   40 mg at 04/14/23 0837    sildenafiL (pulm.hypertension) (REVATIO) tablet 20 mg  20 mg oral TID Rayray Martinez MD   20 mg at 04/14/23 1509       Review of Systems:   All other systems reviewed and are negative except as per HPI.    Physical Exam:     Wt Readings from Last 10 Encounters:   04/14/23 50 kg (110 lb 3.7 oz)   04/06/23 52.6 kg (116 lb)   01/18/23 54.4 kg (120 lb)   11/28/22 55.3 kg (122 lb)   10/07/22 56.2 kg (124 lb)   08/22/22 57.2 kg (126 lb)   08/12/22 57.2 kg (126 lb)   06/27/22 57.6 kg (127 lb)   06/18/22 56.7 kg (125 lb)   11/27/20 63.5 kg (140 lb)       BP Readings from Last 5 Encounters:   04/14/23 (!) 149/70   04/06/23 130/74   01/18/23 140/70   11/28/22 (!) 149/70   10/07/22 140/81       Vitals:    04/14/23 1600   BP: (!) 149/70   Pulse: 81   Resp: 16   Temp:    SpO2: 98%       Body mass index is 17.79 kg/m².  Body surface area is 1.53 meters squared.    Constitutional: NAD, AAOx3  HENT: Normocephalic, atraumatic  head, sclerae anicteric, no cervical lymphadenopathy, trachea midline  Cardiovascular: RRR, no murmurs, rubs or gallops, JVP: 7 mmHg   cm H2O, no carotid bruits.  Respiratory: CTA bilateral lung fields, no wheezes, rales or rhonchi  GI: soft, non-tender/non-distended  Musculoskeletal / Extremities: no peripheral edema, +2 pulses bilateral radial, DP/PT arteries, skin tightening on face and fingertips, marked tenderness to palpation at left lower costochondral junction  Skin: no rashes  Neuro / Psych: no focal deficits, CNII-XII grossly intact, appropriate, cooperative    Diagnostic Data:     Results from last 7 days   Lab Units 04/14/23  0544 04/13/23 1949   SODIUM mEQ/L 139 140   POTASSIUM mEQ/L 5.0 4.6   CHLORIDE mEQ/L 106 107   CO2 mEQ/L 24 25   BUN mg/dL 15 15   CREATININE mg/dL 1.1 1.0   CALCIUM mg/dL 9.7 9.4   ALBUMIN g/dL  --  3.0*   BILIRUBIN TOTAL mg/dL  --  0.2*   ALK PHOS IU/L  --  78   ALT IU/L  --  9*   AST IU/L  --  16   GLUCOSE mg/dL 131* 96   MAGNESIUM mg/dL 2.0  --      Results from last 7 days   Lab Units 04/13/23 1949   WBC K/uL 7.12   HEMOGLOBIN g/dL 10.6*   HEMATOCRIT % 34.3*   MCV fL 87.7   PLATELETS K/uL 267     Component      Latest Ref Rn 4/14/2023   Triglycerides      30 - 149 mg/dL 44    Cholesterol      <=200 mg/dL 194    HDL      >=55 mg/dL 49 (L)    LDL Calculated      <=100 mg/dL 136 (H)    Non-HDL, Calculated      mg/dL 145       Component      Latest Ref Rn 4/13/2023 4/14/2023   Iron      35 - 150 ug/dL  29 (L)    TIBC      270 - 460 ug/dL  245 (L)    UIBC      180 - 360 ug/dL  216    Iron Saturation      15 - 45 %  12 (L)    Hemoglobin A1C      <5.7 % 4.4     Estimated Ave Glucose      mg/dL 80     High Sens Troponin I      <15.0 pg/mL 23.0 (H)     High Sens Troponin I       16.0 (H)     TSH      0.34 - 5.60 mIU/L 4.71     Ferritin      11 - 250 ng/mL  75    Vitamin B-12      180 - 914 pg/mL  362    Folate      >=5.8 ng/mL  15.4      Component      Latest Ref Rng 6/27/2022  4/13/2023   BNP      <=100 pg/mL 111 (H)  105 (H)      Component      Latest Ref Rng & Units 6/27/2022 6/27/2022           3:57 PM  5:53 PM   High Sens Troponin I      <15 pg/mL 12 16 (H)      Component      Latest Ref Rng & Units 6/27/2022   BNP      <=100 pg/mL 111 (H)      Component      Latest Ref Rng & Units 6/27/2022   WBC      3.80 - 10.50 K/uL 7.99   RBC      3.93 - 5.22 M/uL 4.05   Hemoglobin      11.8 - 15.7 g/dL 11.2 (L)   Hematocrit      35.0 - 45.0 % 35.8   MCV      83.0 - 98.0 fL 88.4   MCH      28.0 - 33.2 pg 27.7 (L)   MCHC      32.2 - 35.5 g/dL 31.3 (L)   RDW      11.7 - 14.4 % 15.6 (H)   Platelets      150 - 369 K/uL 243                                        ECG (4/13/2023, personally reviewed):       TTE (4/14/2023, personally reviewed):    The left ventricular cavity size is normal with mild left ventricular hypertrophy and preserved systolic function. Estimated EF 65%. No regional wall motion abnormalities. Grade I diastolic dysfunction.      The aortic valve is tricuspid. Aortic valve sclerosis. Trace aortic regurgitation.       The visualized portions of the aortic root and ascending aorta are normal in size.      The mitral valve is mildly thickened. Mild mitral annular calcification. Trace mitral regurgitation.        The left atrium is severely dilated.       The right ventricle is normal in size with preserved systolic function      The pulmonic valve is not well seen.      The tricuspid valve is normal in appearance. mild tricuspid regurgitation with an estimated RVSP of 34 mmHg.        Right atrium is normal in size.      The IVC is small and collapses > 50% with inspiration consistent with normal right atrial pressures.      Small pericardial effusion, mostly posterior.        Compared to previous echocardiogram from 8/22/2022, there is no significant change       U/S Venous LE B/L (4/14/2023):   No evidence of DVT in either lower extremity.     CTA Chest (4/13/2023,  personally reviewed):   IMPRESSION:  1. No CT evidence of pulmonary embolism.  2. Moderate cardiomegaly.  3. A small to moderate pericardial effusion is new from 2020.  4. Centrilobular emphysematous changes and bronchiectatic changes are present  throughout the lungs.  5. There are patchy parenchymal infiltrates within the dependent portion of  bilateral lower lobes which spares the periphery. Findings raise suspicion for  superimposed pneumonia or inflammatory process. There is additional patchy  parenchymal infiltrate within the right upper lobe.  6. A 9 mm groundglass opacity is present within the right upper lobe best  visualized on axial image 48 of series 3. Follow-up as per Fleischner Society  guidelines is advised.  7. Right axillary and mediastinal adenopathy as described above is nonspecific  and may be related to the patient's known sarcoidosis. Other causes of  lymphadenopathy such as malignancy is not excluded.  8. A new  8.2 mm solid nodule is present within the medial aspect of the left  lower lobe. There are additional small nodules throughout both lungs. The  possibility of malignancy or metastatic disease is not excluded. If the patient  has previous chest CTs from outside institutions, direct comparison would be  useful to assess for stability versus interval change.     TTE (11/28/2022, personally reviewed):    Normal right- and mildly elevated left-sided filling pressures (RA: 3 mmHg, PCWP: 13 mmHg)   Elevated pulmonary artery pressures (60/22(35 mmHg)) in the setting of PCWP: 13 mmHg.    Normal cardiac output and index (CO: 5.1 L/min, CI: 3.2 L/min/m2)   PVR: 4.3 Wood units. SVR: 1840 dynes/sec/cm5      TTE (8/22/2022, personally reviewed):  · Normal-sized LV. Normal LV systolic function. Estimated EF 55- 60%. Wall motion appears grossly normal. Mild concentric left ventricular hypertrophy. Grade I LV diastolic dysfunction.  · Pulmonic valve not well visualized. Grossly normal pulmonic  valve structure. Trace pulmonic valve regurgitation. No pulmonic valve stenosis.  · Aortic valve not well visualized. Aortic valve not well seen but likely trileaflet. Focal aortic valve calcification present. Sclerotic aortic valve leaflets. Mild aortic valve regurgitation. No aortic valve stenosis.  · RV not well visualized. Normal-sized RV. Normal RV systolic function.  · Normal-sized RA.  · There is a small loculated pericardial effusion anterior to the right atrium. No cardiac tamponade.  · Sclerotic mitral valve. Mitral valve calcification of the anterior leaflet present.  · Trace mitral valve regurgitation.  · No significant mitral valve stenosis.  · Normal-sized IVC. IVC demonstrates normal respiratory collapse.  · Tricuspid valve structure is grossly normal. Mild to moderate tricuspid valve regurgitation.  · Estimated RVSP = 50-55 mmHg.  · Mildly dilated LA.  · No previous echocardiogram available for comparison.     TTE (4/21/2022, outside study):  This result has an attachment that is not available.     A complete transthoracic echocardiogram (including 2D, color flow   Doppler, spectral Doppler and M-mode imaging) was performed using the   standard protocol. The study quality was adequate.     The left ventricle is normal in size. There is moderately increased   wall thickness consistent with moderate concentric hypertrophy.   Endocardium is incompletely visualized, but no regional wall motion   abnormalities are noted in the visualized segments. Normal left   ventricular ejection fraction. The left ventricular ejection fraction by   visual estimate is 55% to 60%.     The right ventricle is normal in size. There is normal function of the   right ventricle.     The left atrium is normal in size. Left atrial volume is 58 mL.     The right atrial volume measures 52 mL. The right atrial volume index   measures 34 mL/m2.     There is trace mitral regurgitation. There is no mitral stenosis.     The  aortic valve is trileaflet. There is mild aortic valve thickening.   There is no aortic stenosis. There is trace aortic regurgitation.     There is mild to moderate tricuspid regurgitation. Pulmonary artery   systolic pressure measures 52 mmHg. The pulmonary artery systolic pressure   is moderately elevated.     The aorta measures 3.2 cm at the sinus of Valsalva. The proximal   segment of the ascending aorta is mildly enlarged. The proximal segment of   the ascending aorta measures 3.4 cm. The proximal segment of the ascending   aorta measures 2.2 cm/m2, indexed for body surface area.     Trivial pericardial effusion present. There is no echocardiographic   evidence of cardiac tamponade.     The inferior vena cava is normal in size (diameter <21 mm) and   decreases >50% in size with inspiration, suggesting a normal right atrial   pressure of 3 mmHg (range 0-5 mm Hg).     Compared to the prior study of 3/13/2020, there are no significant   changes.        PFT (3/23/2022):      PFT (7/14/2021):      CT Chest (5/2/2021, outside study):  CLINICAL INFORMATION: Systemic sclerosis. Interstitial lung disease. Groundglass nodule     PROCEDURE: Unenhanced CT of the chest was performed using thin section reconstructions. Images were obtained on inspiration and expiration.     COMPARISON: June and August 2020     FINDINGS:     LUNGS, PLEURA:     Groundglass opacities with reticulation with subpleural sparing in the lower lobes, without honeycombing or architectural distortion, unchanged.     Right upper lobe groundglass nodule, image 114 of series 3, 8 x 11 mm, previously 6 mm.     Expiratory images are of diagnostic quality and do not demonstrate evidence of clinically significant air trapping.     CARDIOVASCULAR, MEDIASTINUM, THYROID: Coronary calcifications. Trace pericardial effusion.     LYMPH NODES: Subcentimeter mediastinal lymph nodes, unchanged. Unchanged axillary lymph nodes.     SKELETON, CHEST WALL: No  destructive bone lesion     UPPER ABDOMEN: Patulous esophagus. 3 mm right renal stone.       RHC (9/02/2020):  RA   8 mmHg   RV   40/5 mmHg   PA (mean)  44/16 (25) mmHg   PCWP   12 mmHg   CO   4.65 lpm (Thermodilution)   CI   2.65 lpm/m-squared   SVI    33 ml/beat/m-squared (lower limit normal 29)   PVR   2.8 mmHg/l/min (Wood units)   SVR   2064 dyne-sec-cm-5         PFT (3/18/2020):      TTE (3/13/2020, outside study):  · The study quality was good.   · The left ventricle is normal in size. Top normal left ventricular wall   thickness. There are no segmental wall motion abnormalities. The left   ventricular ejection fraction is 55-60% by visual estimate and 3D   echocardiography. Normal left ventricular ejection fraction.   · There is grade I (mild) LV diastolic dysfunction.   · The right ventricle is normal in size. There is normal function of the   right ventricle.   · The right atrium is mildly dilated.   · There is mild mitral valve anterior leaflet thickening. There is mild   mitral valve leaflet calcification. There is flattening of the mitral   leaflets without raphael prolapse. There is trace mitral regurgitation.   · The aortic valve is trileaflet. There is mild thickening of the aortic   valve. There is mild calcification of the aortic valve. There is no aortic   /stenosis. There is trace aortic regurgitation.   · There is mild tricuspid valve leaflet thickening. There is mild to   moderate tricuspid regurgitation. The pulmonary artery systolic pressure   is moderately elevated. Pulmonary artery systolic pressure measures 50   mmHg. There is mid-systolic notching of the RVOT VTI consistent with   elevated pulmonary vascular resistance.   · The inferior vena cava is normal in size (diameter <21 mm) and decreases   >50% in size with inspiration, suggesting a normal right atrial pressure   of 3 mmHg (range 0-5 mm Hg).   · Trivial loculated pericardial effusion present adjacent to the right   ventricle and  adjacent to the right atrium.          Assessment / Plan:     1. Hemoptysis   - With negative CTA Chest and negative U/S B/L LE, it would be reasonable to stop therapeutic anticoagulation  - Possible bronchoscopy per Pulmonology    2. Atypical Chest Pain  - Not ACS  - This appears to be musculoskeletal by history and exquisite tenderness on palpation at left lower costochondral junction    3. Pulmonary Hypertension (WHO Group I, NYHA/WHO FC II/III):   - No evidence for decompensated heart failure  - Occurring in the background of Systemic Scleroderma with ILD. August 2022 TTE showed normal RV size and function, but progressive exertional decline, worsened DLCO and resting tachycardia suggest there may be progression of her pulmonary vascular disease and limitation of cardiac output. This was proven with 11/2022 RHC showing mean PA 35 mmHg (with PCWP 13 mmHg) and PVR 4.3 Wood units.   - Continue Sildenafil 20 mg TID  - Macitentan prior approval in progress  - She is euvolemic on exam and by echocardiography. Would hold further diuresis    4. HTN:   - Elevated.  - Continue Amlodipine.     5. Systemic Scleroderma / Raynaud's Disease:   - Per outpatient Rheumatology (Dr. Giraldo).      6. ILD:   - Per outpatient Pulmonology (Dr. Abbott - due to establish care) and Rheumatology (Dr. Giraldo).      Timothy Arvizu,

## 2023-04-14 NOTE — ASSESSMENT & PLAN NOTE
- Patient would like her 2 daughters to make her medical decisions if she is unable.   - She has never done a LW/AD  - Code status: Full Code  - Goals are restorative. Counseled patient on progressive nature of disease and implications of intubation/full code status. She would want resuscitation, at this time, however she would not want to be kept alive on a vent or in a tremendously debilitated state long term. She would not want to be in a NH long term.  - Patient with a strong mary in [place].  consult placed  - Offered support with [facility] program. Patient has had home care through [facility] and would like to continue with them.

## 2023-04-14 NOTE — ASSESSMENT & PLAN NOTE
Has history of advanced scleroderma complicated by ILD and pulmonary hypertension.  Has a chronic baseline nonproductive cough, chronic hemoptysis and shortness of breath.  Presenting with acute on chronic hemoptysis, worsening shortness of breath and chest pain.  No evidence of ACS.  Recent hospitalization at [facility] for similar symptoms, VQ scan was positive for PE (in the setting of her lung disease), she was sent home on apixaban.  Had no improvement in symptoms.  CTA at admission with no evidence of PE, moderate pericardial effusion, groundglass opacities mainly in the bases.  Resting tremor slightly worse.    Likely multifactorial etiology related to slight volume overload in the setting of chronic lung disease (pHTN) and CVA deconditioning/debility.    -Holding diuresis given patient does not examine volume overloaded and TTE unchanged from prior   -Hold apixaban given worsening hemoptysis and nosebleeds  - PT/OT consulted- Rec home with home health and home PT  - palliative team consulted-- pt opting for restorative care at this time

## 2023-04-14 NOTE — PLAN OF CARE
Problem: Adult Inpatient Plan of Care  Goal: Plan of Care Review  Outcome: Progressing  Flowsheets (Taken 4/14/2023 [id number])  Plan of Care Reviewed With: patient  Outcome Summary:   PT cece completed   pt supervision with bed mobility   pt declining OOB   pt reports she is trying to increase her HHA from 18 to 24 hrs/day   rec home with assist and home PT upon d/c

## 2023-04-14 NOTE — H&P
"     Internal Medicine  History & Physical        CHIEF COMPLAINT   Chest pressure, hemoptysis and SOB     HISTORY OF PRESENT ILLNESS      This is a 62 y.o. female with a past medical history of Cutaneous Systemic Scleroderma, Pulmonary HTN (RVSP 50-55 on TTE 8/22), HFpEF (G1DD), HTN, Raynaud's Disease, , ILD, PE (3/2023), vertigo (on meclizine), protein calorie malnutrition who presents with chest pressure, hemoptysis and SOB.    Patient has a long history of scleroderma that was initially diagnosed in 1998 and has been progressing over the years. 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 She is not on oxygen at home. She was diagnosed with pulmonary HTN and follows with Dr Arvizu, maintained on sildenafil and currently in the process of initiating macitentan.     To note, patient was hospitalized on 3/19/2023 at [facility] for shortness of breath and was diagnosed with a PE on VQ scan showing Right apical wedge shaped perfusion defect. She had negative upper and lower extremity US. She was started on apixaban. She was later seen by her cardiologist on 4/6 when she reported ongoing dyspnea, chest pain and fatigue that were similar to her baseline from her prior hospitalization.    She is presenting today with ongoing constant chest pressure, described as \"someone sitting on her chest\", 10/10 in intensity, no radiation, nonexertional, worse with deep breathing and lying on the right side. She also endorses intermittent shortness of breath at rest and on exertion (can walk about 20 ft).  Does describe occasional swelling of her feet, sleeps on 2-3 pillows at night, endorses PND. She has a history of hemoptysis dating back for years. After her discharge from [facility], she has been experiencing intermittent nose bleeds, up to twice daily and worsening baseline hemoptysis, blood clots, up " to 3-4 times a day. She denies any sore throat, fever, chills, or sick contacts. No palpitations.     Patient reports weight loss of almost 30 lb over the past 6 months. She reports that her appetite varies but does experience early satiety at times. She has recently started nutrition supplements with ensure 3x daily.    She is a light ex-smoker, aj 5 py, stopped in . Denies any alcohol or drug.      Code status: Full code    ED Course:  ED Vitals:   Vitals:    23 0200   BP: 140/71   Pulse: 90   Resp: 19   Temp:    SpO2: 96%     Pertinent labs: Na 140, Cr 1, CO2 25, Cr 1, WBC 7, Hgb 10.6, plt 267 COVID neg., trop 16 > 23.      UA: Clear  VB.4/42  EKG: sinus tachycardia  CXR: mild pulmonary vascular congestion, diffuse interstitial prominence  CT C: pending     S/p furosemide 20 mg, 125 mg IV solumedrol in ED    PAST MEDICAL AND SURGICAL HISTORY      PMHx:  Past Medical History:   Diagnosis Date    De Quervain's tenosynovitis     Essential (primary) hypertension     Follicular carcinoma of thyroid (CMS/HCC)     Gastro-esophageal reflux     Hand ulceration (CMS/HCC)     Hyperlipidemia, unspecified     Interstitial lung disease (CMS/HCC)     Leg ulcer (CMS/HCC)     Lung nodule     Lupus (CMS/HCC)     Osteomyelitis of hand (CMS/HCC)     Osteoporosis     Pulmonary hypertension (CMS/HCC)     Raynaud's disease     Severe    Reflux esophagitis     Scleroderma (CMS/HCC)     Screening mammogram for breast cancer 2021    BI-RADS category: 1 - Negative (care everywhere @ [place])       PSHx:  Past Surgical History:   Procedure Laterality Date    CARDIAC CATHETERIZATION      COLONOSCOPY      HERNIA REPAIR         PCP:   Sara Simmons MD    MEDICATIONS      Prior to Admission medications    Medication Sig Start Date End Date Taking? Authorizing Provider   amLODIPine (NORVASC) 5 mg tablet Take 10 mg by mouth daily. Two 5 mg tablets in the AM and 2 tablet (5 mg) at night PRN. 20    Provider, Historical, MD   balsam peru-castor oiL (VENELEX) ointment Apply topically 2 (two) times a day. 10/7/22   MAGGIE Mensah MD   biotin 1 mg tablet Take 1,000 mcg by mouth.    Romel Hidalgo MD   cholecalciferol, vitamin D3, 1,000 unit (25 mcg) tablet Take 1,000 Units by mouth daily.    Romel Hidalgo MD   clobetasoL (TEMOVATE) 0.05 % ointment Apply topically 2 (two) times a day for 15 days. 8/8/22 8/23/22  MAGGIE Mensah MD   cyanocobalamin, vitamin B-12, 1,000 mcg capsule Take 1,000 mcg by mouth daily. 2/14/20   Romel Hidalgo MD   docusate sodium (COLACE) 100 mg capsule Take 100 mg by mouth daily.    Romel Hidalgo MD   gentamicin (GARAMYCIN) 0.1 % ointment Apply topically daily. 8/8/22   MAGGIE Mensah MD   levothyroxine (SYNTHROID) 100 mcg tablet Take 100 mcg by mouth daily. 7/6/20   Romel Hidalgo MD   meclizine HCl (MECLIZINE ORAL) Take by mouth.    Romel Hidalgo MD   pantoprazole (PROTONIX) 40 mg EC tablet Take 40 mg by mouth daily. 7/6/20   Romel Hidalgo MD   sildenafiL, pulm.hypertension, (REVATIO) 20 mg tablet Take 1 tablet (20 mg total) by mouth 3 (three) times a day. 4/6/23   Timothy Arvizu,    sodium chloride (SALINE WOUND WASH) 0.9 % solution 1 application daily. Use to cleanse your wounds daily 6/18/22   Fiona Montoya MD       Home medications were personally reviewed.    ALLERGIES      Aspirin, Ibuprofen, Iodine, Iodine and iodide containing products, Penicillins, Sulfa (sulfonamide antibiotics), Clindamycin, Hydrochlorothiazide, Oxycodone, Sulfamethoxazole-trimethoprim, and Latex    FAMILY HISTORY      Family History   Problem Relation Age of Onset    Heart disease Biological Brother         stent    Breast cancer Niece     Cervical cancer Neg Hx     Uterine cancer Neg Hx     Colon cancer Neg Hx     Ovarian cancer Neg Hx        SOCIAL HISTORY      Social History     Socioeconomic History    Marital status:     Tobacco Use    Smoking status: Former     Types: Cigarettes     Quit date: 9/15/2005     Years since quittin.5    Smokeless tobacco: Never    Tobacco comments:     Would smoke 1/2 of 1/2 PPD, quit in    Vaping Use    Vaping status: Never Used   Substance and Sexual Activity    Alcohol use: Never    Drug use: Never    Sexual activity: Not Currently     Birth control/protection: Post-menopausal     Social Determinants of Health     Food Insecurity: No Food Insecurity (2023)    Hunger Vital Sign     Worried About Running Out of Food in the Last Year: Never true     Ran Out of Food in the Last Year: Never true       REVIEW OF SYSTEMS      Review of Systems   Constitutional: Positive for appetite change, fatigue and unexpected weight change.   HENT: Positive for nosebleeds.    Eyes: Negative.    Respiratory: Positive for cough, chest tightness and shortness of breath.    Cardiovascular: Positive for chest pain and leg swelling. Negative for palpitations.   Gastrointestinal: Positive for abdominal pain. Negative for abdominal distention, anal bleeding, blood in stool, constipation and diarrhea.   Endocrine: Negative.    Genitourinary: Negative.    Musculoskeletal: Negative.    Neurological: Negative.    Hematological: Negative.    Psychiatric/Behavioral: Negative.    All other systems reviewed and are negative.      PHYSICAL EXAMINATION      Temp:  [36.5 °C (97.7 °F)] 36.5 °C (97.7 °F)  Heart Rate:  [] 90  Resp:  [19-24] 19  BP: (140-156)/(69-80) 140/71  Body mass index is 18.24 kg/m².    Physical Exam  Vitals reviewed.   Constitutional:       Appearance: Normal appearance. She is ill-appearing.   HENT:      Head: Normocephalic and atraumatic.   Neck:      Vascular: JVD present.   Cardiovascular:      Rate and Rhythm: Normal rate and regular rhythm.      Pulses: Normal pulses.      Heart sounds: Normal heart sounds.      Comments: Trace b/l LE edema, up to ankles   Pulmonary:      Effort:  Pulmonary effort is normal.      Breath sounds: Rales present.   Abdominal:      General: Abdomen is flat. Bowel sounds are normal. There is no distension.      Palpations: Abdomen is soft.      Tenderness: There is no guarding or rebound.   Musculoskeletal:      Comments: Multiple amputated fingers    Skin:     General: Skin is warm.      Comments: Vitiligo noted on forehead    Neurological:      General: No focal deficit present.      Mental Status: She is alert and oriented to person, place, and time. Mental status is at baseline.      Cranial Nerves: No cranial nerve deficit.      Sensory: No sensory deficit.      Motor: No weakness.   Psychiatric:         Mood and Affect: Mood normal.         Behavior: Behavior normal.         Thought Content: Thought content normal.       LABS / IMAGING / EKG        Labs:  Results from last 7 days   Lab Units 04/13/23 1949   WBC K/uL 7.12   HEMOGLOBIN g/dL 10.6*   HEMATOCRIT % 34.3*   PLATELETS K/uL 267     Results from last 7 days   Lab Units 04/13/23 1949   SODIUM mEQ/L 140   POTASSIUM mEQ/L 4.6   CHLORIDE mEQ/L 107   CO2 mEQ/L 25   BUN mg/dL 15   CREATININE mg/dL 1.0   CALCIUM mg/dL 9.4   ALBUMIN g/dL 3.0*   BILIRUBIN TOTAL mg/dL 0.2*   ALK PHOS IU/L 78   ALT IU/L 9*   AST IU/L 16   GLUCOSE mg/dL 96     Microbiology Data personally reviewed:  Microbiology Results     Procedure Component Value Units Date/Time    SARS-CoV-2 (COVID-19), PCR Nasopharynx [[id number]]  (Normal) Collected: 04/13/23 2142    Specimen: Nasopharyngeal Swab from Nasopharynx Updated: 04/13/23 2304    Narrative:      The following orders were created for panel order SARS-CoV-2 (COVID-19), PCR Nasopharynx.  Procedure                               Abnormality         Status                     ---------                               -----------         ------                     SARS-COV-2 (COVID-19)/ F...[[id number]]  Normal              Final result                 Please view results for these tests on  the individual orders.    SARS-COV-2 (COVID-19)/ FLU A/B, AND RSV, PCR Nasopharynx [[id number]]  (Normal) Collected: 04/13/23 2142    Specimen: Nasopharyngeal Swab from Nasopharynx Updated: 04/13/23 2304     SARS-CoV-2 (COVID-19) Negative     Influenza A Negative     Influenza B Negative     Respiratory Syncytial Virus Negative    Narrative:      Testing performed using real-time PCR for detection of COVID-19. EUA approved validation studies performed on site.           Imaging personally reviewed(does not include unread studies):  X-RAY CHEST 1 VIEW    Result Date: 4/13/2023  IMPRESSION: 1. Mild pulmonary vascular congestion suspected. 2. Cardiomegaly. 3. Stable diffuse interstitial prominence, likely chronic.      ECG/Telemetry  I have independently reviewed the ECG. No significant findings.    ASSESSMENT AND PLAN           * Dyspnea  Assessment & Plan  Has history of advanced scleroderma complicated by ILD and pulmonary hypertension.  Has a chronic baseline nonproductive cough, chronic hemoptysis and shortness of breath.  Presenting with acute on chronic hemoptysis, worsening shortness of breath and chest pain.  No evidence of ACS.  Recent hospitalization at [facility] for similar symptoms, VQ scan was positive for PE (in the setting of her lung disease), she was sent home on apixaban.  Had no improvement in symptoms.  CTA at admission with no evidence of PE, moderate pericardial effusion, groundglass opacities mainly in the bases.  Resting tremor slightly worse.    Likely multifactorial etiology related to slight volume overload in the setting of chronic lung disease and CVA deconditioning/debility.  -Continue diuresis with IV furosemide 20 mg daily  -CHF order set  -Recheck TTE, evaluate pericardial effusion and pulmonary hypertension, cards consulted, appreciate recs  -Hold apixaban given worsening hemoptysis and nosebleeds, questionable PE on the previous VQ scan done at [facility], pulmonary consulted, appreciate recs,  will discuss the need of apixaban moving forward  -Received on 25 of IV Solu-Medrol for contrast prep in the ED, hold off further steroids for now  -PT/OT consulted, palliative team consulted    History of pulmonary embolism  Assessment & Plan  hospitalized on 3/19/2023 at [facility] for shortness of breath and was diagnosed with a PE on VQ scan showing Right apical wedge shaped perfusion defect. She had negative upper and lower extremity US.  No evidence of PE on CTA.     - hold apixban  - pulm consulted, aj recs    Pulmonary hypertension (CMS/HCC)  Assessment & Plan  Pulmonary Hypertension (WHO Group I)  Occurring in the background of Systemic Scleroderma with ILD.   August 2022 TTE showed normal RV size and function, but progressive exertional decline, worsened DLCO and resting tachycardia  Concern for progression of her pulmonary vascular disease and limitation of cardiac output.   Follows with Dr Arvizu.    -Continue with sildenafil  -cards consulted, consider cardiac cath       Anemia  Assessment & Plan  Hemoglobin of 10.6 from baseline of 11-12.  In the setting of worsening baseline hemoptysis.  MCV of 88.    -Hold apixaban  -Check iron panel, B12 and folate    Acute on chronic heart failure with preserved ejection fraction (CMS/HCC)  Assessment & Plan  Continue diuresis.  CHF order set.  Consult cardiology.    Weight loss  Assessment & Plan  Reports up to 30 pounds weight loss over the past 6 months.    -Consult to nutrition  -Need age-appropriate cancer screening with discharge    Debility  Assessment & Plan  Pt/ot consulted.     Vertigo  Assessment & Plan  Continue meclizine as needed     Hemoptysis  Assessment & Plan  Pt with history of hemoptysis at baseline but appears to be somewhat increased now on AC for PE    -hold apixaban, might not need to restart given VQ scan iso of ILD  -Trend Hgb and transfuse as needed  -pulm consulted, aj recs    Pericardial effusion  Assessment & Plan  New compared  to prior imaging  Unclear etiology    -Assess further with Echocardiogram    Raynaud's disease  Assessment & Plan  Hx Systemic Scleroderma / Raynaud's disease per Rheumatology (Dr. Giraldo).  Autoamputation of multiple fingers.     -continue CCB, outpatient follow up    Essential (primary) hypertension  Assessment & Plan  Continue Amlodipine.       VTE Assessment: Padua    VTE Prophylaxis: Current anticoagulants:  enoxaparin (LOVENOX) syringe 40 mg, subcutaneous, Daily (6p)      Palliative Care Screen:    Code Status: Full Code  Estimated discharge date: 4/16/2023     ATTENDING DOCUMENTATION  ALSO SEE ATTENDING ATTESTATION SECTION OF NOTE

## 2023-04-14 NOTE — ASSESSMENT & PLAN NOTE
Patient with shortness of breath likely multifactorial etiology related to slight volume overload in the setting of ILD and deconditioning/debility. She reports she is short of breath with exertion at baseline but it becamse worse over the last few days. Recent admission for PE. She has associated cough. She reports it is worse with exertion and improved while at rest.    - Pulm following with possible plan to start cellcept for immunosuppression  - Patient is not interested in opiods for symptom management for shortness of breath.

## 2023-04-15 LAB
ANION GAP SERPL CALC-SCNC: 12 MEQ/L (ref 3–15)
ANION GAP SERPL CALC-SCNC: 8 MEQ/L (ref 3–15)
BUN SERPL-MCNC: 25 MG/DL (ref 8–20)
BUN SERPL-MCNC: 29 MG/DL (ref 8–20)
CALCIUM SERPL-MCNC: 9.1 MG/DL (ref 8.9–10.3)
CALCIUM SERPL-MCNC: 9.5 MG/DL (ref 8.9–10.3)
CHLORIDE SERPL-SCNC: 104 MEQ/L (ref 98–109)
CHLORIDE SERPL-SCNC: 105 MEQ/L (ref 98–109)
CO2 SERPL-SCNC: 22 MEQ/L (ref 22–32)
CO2 SERPL-SCNC: 26 MEQ/L (ref 22–32)
CREAT SERPL-MCNC: 1 MG/DL (ref 0.6–1.1)
CREAT SERPL-MCNC: 1.1 MG/DL (ref 0.6–1.1)
ERYTHROCYTE [DISTWIDTH] IN BLOOD BY AUTOMATED COUNT: 16.2 % (ref 11.7–14.4)
GFR SERPL CREATININE-BSD FRML MDRD: >60 ML/MIN/1.73M*2
GFR SERPL CREATININE-BSD FRML MDRD: >60 ML/MIN/1.73M*2
GLUCOSE SERPL-MCNC: 79 MG/DL (ref 70–99)
GLUCOSE SERPL-MCNC: 85 MG/DL (ref 70–99)
HCT VFR BLDCO AUTO: 34.4 % (ref 35–45)
HGB BLD-MCNC: 10.6 G/DL (ref 11.8–15.7)
MAGNESIUM SERPL-MCNC: 2 MG/DL (ref 1.8–2.5)
MAGNESIUM SERPL-MCNC: 2.1 MG/DL (ref 1.8–2.5)
MCH RBC QN AUTO: 27.3 PG (ref 28–33.2)
MCHC RBC AUTO-ENTMCNC: 30.8 G/DL (ref 32.2–35.5)
MCV RBC AUTO: 88.7 FL (ref 83–98)
PDW BLD AUTO: 11.7 FL (ref 9.4–12.3)
PLATELET # BLD AUTO: 230 K/UL (ref 150–369)
POTASSIUM SERPL-SCNC: 4 MEQ/L (ref 3.6–5.1)
POTASSIUM SERPL-SCNC: 4.4 MEQ/L (ref 3.6–5.1)
RBC # BLD AUTO: 3.88 M/UL (ref 3.93–5.22)
SODIUM SERPL-SCNC: 138 MEQ/L (ref 136–144)
SODIUM SERPL-SCNC: 139 MEQ/L (ref 136–144)
WBC # BLD AUTO: 10.12 K/UL (ref 3.8–10.5)

## 2023-04-15 PROCEDURE — 63700000 HC SELF-ADMINISTRABLE DRUG

## 2023-04-15 PROCEDURE — 80048 BASIC METABOLIC PNL TOTAL CA: CPT | Performed by: STUDENT IN AN ORGANIZED HEALTH CARE EDUCATION/TRAINING PROGRAM

## 2023-04-15 PROCEDURE — 21400000 HC ROOM AND CARE CCU/INTERMEDIATE

## 2023-04-15 PROCEDURE — 83735 ASSAY OF MAGNESIUM: CPT | Mod: 91 | Performed by: STUDENT IN AN ORGANIZED HEALTH CARE EDUCATION/TRAINING PROGRAM

## 2023-04-15 PROCEDURE — 63600000 HC DRUGS/DETAIL CODE: Mod: JZ | Performed by: STUDENT IN AN ORGANIZED HEALTH CARE EDUCATION/TRAINING PROGRAM

## 2023-04-15 PROCEDURE — 63700000 HC SELF-ADMINISTRABLE DRUG: Performed by: STUDENT IN AN ORGANIZED HEALTH CARE EDUCATION/TRAINING PROGRAM

## 2023-04-15 PROCEDURE — 85027 COMPLETE CBC AUTOMATED: CPT | Performed by: STUDENT IN AN ORGANIZED HEALTH CARE EDUCATION/TRAINING PROGRAM

## 2023-04-15 PROCEDURE — 99233 SBSQ HOSP IP/OBS HIGH 50: CPT | Performed by: HOSPITALIST

## 2023-04-15 PROCEDURE — 36415 COLL VENOUS BLD VENIPUNCTURE: CPT | Performed by: STUDENT IN AN ORGANIZED HEALTH CARE EDUCATION/TRAINING PROGRAM

## 2023-04-15 PROCEDURE — G0378 HOSPITAL OBSERVATION PER HR: HCPCS

## 2023-04-15 PROCEDURE — 93005 ELECTROCARDIOGRAM TRACING: CPT | Performed by: HOSPITALIST

## 2023-04-15 RX ORDER — LEVOTHYROXINE SODIUM 100 UG/1
100 TABLET ORAL DAILY
Status: DISCONTINUED | OUTPATIENT
Start: 2023-04-15 | End: 2023-04-19 | Stop reason: HOSPADM

## 2023-04-15 RX ORDER — LIDOCAINE 560 MG/1
1 PATCH PERCUTANEOUS; TOPICAL; TRANSDERMAL DAILY PRN
Status: DISCONTINUED | OUTPATIENT
Start: 2023-04-15 | End: 2023-04-19 | Stop reason: HOSPADM

## 2023-04-15 RX ADMIN — ACETAMINOPHEN 650 MG: 325 TABLET ORAL at 04:29

## 2023-04-15 RX ADMIN — SILDENAFIL 20 MG: 20 TABLET, FILM COATED ORAL at 13:23

## 2023-04-15 RX ADMIN — LEVOTHYROXINE SODIUM 100 MCG: 0.1 TABLET ORAL at 07:59

## 2023-04-15 RX ADMIN — ENOXAPARIN SODIUM 40 MG: 40 INJECTION SUBCUTANEOUS at 17:09

## 2023-04-15 RX ADMIN — AMLODIPINE BESYLATE 10 MG: 10 TABLET ORAL at 08:00

## 2023-04-15 RX ADMIN — SILDENAFIL 20 MG: 20 TABLET, FILM COATED ORAL at 22:40

## 2023-04-15 RX ADMIN — CYANOCOBALAMIN TAB 1000 MCG 1000 MCG: 1000 TAB at 08:00

## 2023-04-15 RX ADMIN — LIDOCAINE 1 PATCH: 4 PATCH TOPICAL at 06:45

## 2023-04-15 RX ADMIN — MECLIZINE 12.5 MG: 12.5 TABLET ORAL at 09:11

## 2023-04-15 RX ADMIN — SILDENAFIL 20 MG: 20 TABLET, FILM COATED ORAL at 08:00

## 2023-04-15 RX ADMIN — ACETAMINOPHEN 650 MG: 325 TABLET ORAL at 18:28

## 2023-04-15 ASSESSMENT — COGNITIVE AND FUNCTIONAL STATUS - GENERAL
CLIMB 3 TO 5 STEPS WITH RAILING: 3 - A LITTLE
MOVING TO AND FROM BED TO CHAIR: 3 - A LITTLE
WALKING IN HOSPITAL ROOM: 3 - A LITTLE
STANDING UP FROM CHAIR USING ARMS: 3 - A LITTLE

## 2023-04-15 NOTE — PROGRESS NOTES
Was messaged by nursing that patient was having chest pain.    At bedside, patient is resting comfortably when I entered the room.  Patient states that the chest pain is intense and 9/10 in severity.  She states that this pain is similar to the chest pain she has been having since admission.  EKG without acute changes.    Per chart review patient was evaluated by cardiology earlier on 4/14.  Chest pain thought to be musculoskeletal in nature.    Patient's exam is consistent with musculoskeletal chest pain, as she is extremely tender to palpation of the left chest.  As I was palpating, she pushed my hand away, stating that makes the pain way worse.  Will administer Tylenol and order lidocaine patch.  We will continue to monitor.    All Moore MD

## 2023-04-15 NOTE — NURSING NOTE
"Important note for attending physician:    STOPBang completed as per COPD or HF Pathway  (Snoring, Tiredness, Observed red flags, BP Medication, BMI, Age, Neck Circumference, Gender)     The STOPBang is a \"patient-completed\" questionnaire that can quantify the risk of Obstructive Sleep Apnea (VIDA)    STOP-Bang Total Score: 3    0-2 []  Low Risk 3-4 [x]   Medium Risk 5-8  []  High Risk     If your patient scores >4 (medium to high risk for VIDA) please highly recommend a discussion with their primary care provider regarding an outpatient sleep study at one of our [facility].     On discharge you can facilitate outpatient follow-up by:    o In either the discharge instructions section or the AVS enter \"SLEEP STUDY\" into the smartext box and click enter, select the smarttext \"[facility] IP RECOMMEND AMB SLEEP STUDY\" which will place documentation into the discharge instructions/AVS recommending the sleep study to the patient, then complete the discharge process.      Or    o Place After Visit Procedure order \"Polysomnogram (sleep study)\" and select preferred site: [facility], [facility], [facility] or      A [facility] representative will reach out to patient and primary care provider to arrange follow-up.    Thank you.  Respiratory Care Department  "

## 2023-04-15 NOTE — NURSING NOTE
Pt had episode of emesis. She believes it is from the lidocaine patch on her chest. Patch removed. Pt c/o dizziness, PRN antivert given per MAR. Provider updated.

## 2023-04-15 NOTE — PLAN OF CARE
Problem: Adult Inpatient Plan of Care  Goal: Plan of Care Review  Outcome: Progressing  Flowsheets (Taken 4/15/2023 1408)  Progress: no change  Plan of Care Reviewed With: patient  Outcome Summary: PT AAOx4, denies pain. C/o dizziness, PRN meds given per MAR. VSS on RA. See jennifer for full nursing assessment. FSP maintained. Purewick in place. Call bell and all needs within reach.

## 2023-04-15 NOTE — ASSESSMENT & PLAN NOTE
Not currently on any therapy   Cellcept has been suggested and refused in the past.    -Needs outpatient follow up

## 2023-04-15 NOTE — PROGRESS NOTES
Internal Medicine  Daily Progress Note       SUBJECTIVE   This is a 62 y.o. year-old female admitted on 4/13/2023 with Cardiomegaly [I51.7]  Pulmonary fibrosis (CMS/HCC) [J84.10]  Pericardial effusion [I31.39].    Interval History: Overnight patient had chest pain that has been consistent with her musculoskeletal chest pain she has been having chronically, reproducible on exam and improved with Tylenol and lidocaine patch.  Patient seen and examined at bedside this morning.  She states that the lidocaine patch significantly helped her chest discomfort.  States that she has had no further episodes of hemoptysis.  Denies any fevers chills or shortness of breath.  Offers no complaints at this time.      OBJECTIVE   Vital signs in last 24 hours:  Temp:  [36.3 °C (97.3 °F)-36.4 °C (97.6 °F)] 36.4 °C (97.6 °F)  Heart Rate:  [] 82  Resp:  [16-17] 17  BP: (117-157)/(58-89) 134/73  SpO2:  [93 %-100 %] 100 %  Oxygen Therapy: None (Room air)    Weight & I/Os:  Weights (last 5 days)     Date/Time Weight    04/15/23 0600 51.8 kg (114 lb 3.2 oz)    04/14/23 1523 50 kg (110 lb 3.7 oz)    04/14/23 0447 50 kg (110 lb 3.2 oz)    04/14/23 0300 50 kg (110 lb 3.2 oz)    04/13/23 18:04:29 51.3 kg (113 lb)          Intake/Output Summary (Last 24 hours) at 4/15/2023 0659  Last data filed at 4/15/2023 0600  24 Hour Net Input/Output from 7AM Yesterday   Intake 1120 ml   Output 1300 ml   Net -180 ml        PHYSICAL EXAMINATION   Physical Exam  Vitals and nursing note reviewed.   Constitutional:       General: She is not in acute distress.     Appearance: She is not toxic-appearing.      Comments: sarcopenic   HENT:      Head: Normocephalic and atraumatic.      Nose: No congestion or rhinorrhea.      Mouth/Throat:      Mouth: Mucous membranes are moist.      Pharynx: Oropharynx is clear. No oropharyngeal exudate.   Eyes:      General: No scleral icterus.     Conjunctiva/sclera: Conjunctivae normal.   Cardiovascular:      Rate and  Rhythm: Normal rate and regular rhythm.      Pulses: Normal pulses.      Heart sounds: Normal heart sounds. No murmur heard.  Pulmonary:      Effort: Pulmonary effort is normal. No respiratory distress.      Breath sounds: Normal breath sounds. No wheezing.   Abdominal:      General: Abdomen is flat. Bowel sounds are normal. There is no distension.      Palpations: Abdomen is soft.      Tenderness: There is no abdominal tenderness.   Musculoskeletal:         General: Deformity present.      Right lower leg: No edema.      Left lower leg: No edema.      Comments: Multiple amputated fingers   Skin:     General: Skin is warm and dry.      Capillary Refill: Capillary refill takes less than 2 seconds.      Findings: No lesion or rash.      Comments: vitiligo   Neurological:      General: No focal deficit present.      Mental Status: She is alert and oriented to person, place, and time.   Psychiatric:         Mood and Affect: Mood normal.          LINES, CATHETERS, DRAINS, AIRWAYS, & WOUNDS   Lines, drains, airways, & wounds:  Peripheral IV (Adult) 04/13/23 Anterior;Left;Proximal Forearm (Active)   Number of days: 2        LABS, IMAGING, & TELE   Labs:  I have reviewed the patient's labs to the time of note. No new clinical concern.    Results from last 7 days   Lab Units 04/15/23  0834 04/13/23  1949   WBC K/uL 10.12 7.12   HEMOGLOBIN g/dL 10.6* 10.6*   HEMATOCRIT % 34.4* 34.3*   PLATELETS K/uL 230 267       Results from last 7 days   Lab Units 04/15/23  0834 04/14/23  2028 04/14/23  0544 04/13/23  1949   SODIUM mEQ/L 138 139 139 140   POTASSIUM mEQ/L 4.4 4.2 5.0 4.6   CHLORIDE mEQ/L 104 105 106 107   CO2 mEQ/L 22 23 24 25   BUN mg/dL 25* 21* 15 15   CREATININE mg/dL 1.1 1.1 1.1 1.0   CALCIUM mg/dL 9.5 9.5 9.7 9.4   ALBUMIN g/dL  --   --   --  3.0*   BILIRUBIN TOTAL mg/dL  --   --   --  0.2*   ALK PHOS IU/L  --   --   --  78   ALT IU/L  --   --   --  9*   AST IU/L  --   --   --  16   GLUCOSE mg/dL 79 146* 131* 96      Imaging personally reviewed (does not include unread studies):  Ultrasound venous leg bilateral    Result Date: 4/14/2023  CLINICAL HISTORY: Swelling. R06.00: Dyspnea, unspecified COMPARISON: none TECHNIQUE: Gray scale, color Doppler and spectral Doppler ultrasound of the deep veins both lower extremities with dynamic compression. FINDINGS: There is normal response to compression within the bilateral common femoral, superficial femoral, and popliteal veins. Normal color-flow and venous waveforms of the deep veins. Included calf veins patent with normal compression.     IMPRESSION: No evidence of DVT in either lower extremity.    Transthoracic Echo (TTE) Complete    Result Date: 4/14/2023  The left ventricular cavity size is normal with mild left ventricular hypertrophy and preserved systolic function. Estimated EF 65%. No regional wall motion abnormalities. Grade I diastolic dysfunction. The aortic valve is tricuspid. Aortic valve sclerosis. Trace aortic regurgitation. The visualized portions of the aortic root and ascending aorta are normal in size. The mitral valve is mildly thickened. Mild mitral annular calcification. Trace mitral regurgitation.  The left atrium is severely dilated. The right ventricle is normal in size with preserved systolic function The pulmonic valve is not well seen. The tricuspid valve is normal in appearance. mild tricuspid regurgitation with an estimated RVSP of 34 mmHg.  Right atrium is normal in size. The IVC is small and collapses > 50% with inspiration consistent with normal right atrial pressures. Small pericardial effusion, mostly posterior.  Compared to previous echocardiogram from 8/22/2022, there is no significant change    Telemetry/EKG:  I have independently reviewed the telemetry. No events for the last 24 hours.     ASSESSMENT & PLAN   Hemoptysis  Assessment & Plan  Pt with history of hemoptysis at baseline but appears to be somewhat increased now on AC for PE    -hold  apixaban, might not need to restart given VQ scan iso of ILD  -Trend Hgb and transfuse as needed  -pulm consulted, aj recs    * Dyspnea  Assessment & Plan  Has history of advanced scleroderma complicated by ILD and pulmonary hypertension.  Has a chronic baseline nonproductive cough, chronic hemoptysis and shortness of breath.  Presenting with acute on chronic hemoptysis, worsening shortness of breath and chest pain.  No evidence of ACS.  Recent hospitalization at [facility] for similar symptoms, VQ scan was positive for PE (in the setting of her lung disease), she was sent home on apixaban.  Had no improvement in symptoms.  CTA at admission with no evidence of PE, moderate pericardial effusion, groundglass opacities mainly in the bases.  Resting tremor slightly worse.    Likely multifactorial etiology related to slight volume overload in the setting of chronic lung disease and CVA deconditioning/debility.    -Holding diuresis given patient does not examine volume overloaded and TTE unchanged from prior   -Hold apixaban given worsening hemoptysis and nosebleeds, questionable PE on the previous VQ scan done at [facility], pulmonary consulted, appreciate recs, will discuss the need of apixaban moving forward  -Received on 25 of IV Solu-Medrol for contrast prep in the ED, hold off further steroids for now  -PT/OT consulted  - palliative team consulted    Anemia  Assessment & Plan  Hemoglobin of 10.6 from baseline of 11-12.  In the setting of worsening baseline hemoptysis.  MCV of 88.  Iron and iron sat low (29 and 12 respectively). B12 normal     - Start iron supp  -Hold apixaban      Acute on chronic heart failure with preserved ejection fraction (CMS/HCC)  Assessment & Plan  Not overloaded on exam or by Echo    - Hold further diuresis    Weight loss  Assessment & Plan  Reports up to 30 pounds weight loss over the past 6 months.    -Consult to nutrition  -Need age-appropriate cancer screening with  discharge    Debility  Assessment & Plan  Pt/ot consulted.     Vertigo  Assessment & Plan  Continue meclizine as needed     History of pulmonary embolism  Assessment & Plan  hospitalized on 3/19/2023 at [facility] for shortness of breath and was diagnosed with a PE on VQ scan showing Right apical wedge shaped perfusion defect. She had negative upper and lower extremity US.  No evidence of PE on CTA.     - hold apixban  - pulm consulted, aj recs    Pericardial effusion  Assessment & Plan  New compared to prior imaging  Unclear etiology    -Assess further with Echocardiogram    Scleroderma (CMS/HCC)  Assessment & Plan    -Consider immunotherapy (Cellcept has been suggested and refused in the past)    Pulmonary hypertension (CMS/HCC)  Assessment & Plan  Pulmonary Hypertension (WHO Group I)  Occurring in the background of Systemic Scleroderma with ILD.   August 2022 TTE showed normal RV size and function, but progressive exertional decline, worsened DLCO and resting tachycardia  Concern for progression of her pulmonary vascular disease and limitation of cardiac output.   Follows with Dr Arvizu.    - Continue Sildenafil 20 mg TID  - Macitentan prior approval in progress  - She is euvolemic on exam and by echocardiography. Would hold further diuresis  - Dr. Arvizu following      Raynaud's disease  Assessment & Plan  Hx Systemic Scleroderma / Raynaud's disease per Rheumatology (Dr. Giraldo).  Autoamputation of multiple fingers.     -continue CCB, outpatient follow up    Essential (primary) hypertension  Assessment & Plan  Continue Amlodipine.       VTE Assessment: Padua    VTE Prophylaxis: Current anticoagulants:  enoxaparin (LOVENOX) syringe 40 mg, subcutaneous, Daily (6p)      Code Status: Full Code  Estimated discharge date: 4/17/2023     ATTENDING DOCUMENTATION  ALSO SEE ATTENDING ATTESTATION SECTION OF NOTE

## 2023-04-16 LAB
ANION GAP SERPL CALC-SCNC: 8 MEQ/L (ref 3–15)
ATRIAL RATE: 82
BUN SERPL-MCNC: 27 MG/DL (ref 8–20)
CALCIUM SERPL-MCNC: 9.1 MG/DL (ref 8.9–10.3)
CHLORIDE SERPL-SCNC: 105 MEQ/L (ref 98–109)
CO2 SERPL-SCNC: 25 MEQ/L (ref 22–32)
CREAT SERPL-MCNC: 0.9 MG/DL (ref 0.6–1.1)
ERYTHROCYTE [DISTWIDTH] IN BLOOD BY AUTOMATED COUNT: 16.2 % (ref 11.7–14.4)
GFR SERPL CREATININE-BSD FRML MDRD: >60 ML/MIN/1.73M*2
GLUCOSE SERPL-MCNC: 84 MG/DL (ref 70–99)
HCT VFR BLDCO AUTO: 33.4 % (ref 35–45)
HGB BLD-MCNC: 10 G/DL (ref 11.8–15.7)
MAGNESIUM SERPL-MCNC: 1.9 MG/DL (ref 1.8–2.5)
MCH RBC QN AUTO: 26.9 PG (ref 28–33.2)
MCHC RBC AUTO-ENTMCNC: 29.9 G/DL (ref 32.2–35.5)
MCV RBC AUTO: 89.8 FL (ref 83–98)
P AXIS: 65
PDW BLD AUTO: 11.7 FL (ref 9.4–12.3)
PLATELET # BLD AUTO: 262 K/UL (ref 150–369)
POTASSIUM SERPL-SCNC: 4.1 MEQ/L (ref 3.6–5.1)
PR INTERVAL: 138
QRS DURATION: 92
QT INTERVAL: 416
QTC CALCULATION(BAZETT): 486
R AXIS: 40
RBC # BLD AUTO: 3.72 M/UL (ref 3.93–5.22)
SODIUM SERPL-SCNC: 138 MEQ/L (ref 136–144)
T WAVE AXIS: 83
VENTRICULAR RATE: 82
WBC # BLD AUTO: 9.39 K/UL (ref 3.8–10.5)

## 2023-04-16 PROCEDURE — 63700000 HC SELF-ADMINISTRABLE DRUG

## 2023-04-16 PROCEDURE — 93010 ELECTROCARDIOGRAM REPORT: CPT | Performed by: INTERNAL MEDICINE

## 2023-04-16 PROCEDURE — 99233 SBSQ HOSP IP/OBS HIGH 50: CPT | Performed by: HOSPITALIST

## 2023-04-16 PROCEDURE — 25000000 HC PHARMACY GENERAL

## 2023-04-16 PROCEDURE — 21400000 HC ROOM AND CARE CCU/INTERMEDIATE

## 2023-04-16 PROCEDURE — 85027 COMPLETE CBC AUTOMATED: CPT | Performed by: STUDENT IN AN ORGANIZED HEALTH CARE EDUCATION/TRAINING PROGRAM

## 2023-04-16 PROCEDURE — 83735 ASSAY OF MAGNESIUM: CPT

## 2023-04-16 PROCEDURE — 63700000 HC SELF-ADMINISTRABLE DRUG: Performed by: STUDENT IN AN ORGANIZED HEALTH CARE EDUCATION/TRAINING PROGRAM

## 2023-04-16 PROCEDURE — 63600000 HC DRUGS/DETAIL CODE: Mod: JZ | Performed by: STUDENT IN AN ORGANIZED HEALTH CARE EDUCATION/TRAINING PROGRAM

## 2023-04-16 PROCEDURE — 93005 ELECTROCARDIOGRAM TRACING: CPT

## 2023-04-16 PROCEDURE — 36415 COLL VENOUS BLD VENIPUNCTURE: CPT | Performed by: STUDENT IN AN ORGANIZED HEALTH CARE EDUCATION/TRAINING PROGRAM

## 2023-04-16 PROCEDURE — 80048 BASIC METABOLIC PNL TOTAL CA: CPT

## 2023-04-16 PROCEDURE — G0378 HOSPITAL OBSERVATION PER HR: HCPCS

## 2023-04-16 RX ORDER — IBUPROFEN/PSEUDOEPHEDRINE HCL 200MG-30MG
3 TABLET ORAL NIGHTLY
Status: DISCONTINUED | OUTPATIENT
Start: 2023-04-16 | End: 2023-04-19 | Stop reason: HOSPADM

## 2023-04-16 RX ORDER — LIDOCAINE HYDROCHLORIDE 20 MG/ML
2 SOLUTION OROPHARYNGEAL 3 TIMES DAILY PRN
Status: DISCONTINUED | OUTPATIENT
Start: 2023-04-16 | End: 2023-04-19 | Stop reason: HOSPADM

## 2023-04-16 RX ADMIN — AMLODIPINE BESYLATE 10 MG: 10 TABLET ORAL at 08:03

## 2023-04-16 RX ADMIN — CYANOCOBALAMIN TAB 1000 MCG 1000 MCG: 1000 TAB at 08:04

## 2023-04-16 RX ADMIN — LIDOCAINE HYDROCHLORIDE 2 ML: 20 SOLUTION ORAL; TOPICAL at 15:13

## 2023-04-16 RX ADMIN — SILDENAFIL 20 MG: 20 TABLET, FILM COATED ORAL at 20:53

## 2023-04-16 RX ADMIN — LEVOTHYROXINE SODIUM 100 MCG: 0.1 TABLET ORAL at 08:04

## 2023-04-16 RX ADMIN — ENOXAPARIN SODIUM 40 MG: 40 INJECTION SUBCUTANEOUS at 17:01

## 2023-04-16 RX ADMIN — SILDENAFIL 20 MG: 20 TABLET, FILM COATED ORAL at 08:03

## 2023-04-16 RX ADMIN — Medication 3 MG: at 23:04

## 2023-04-16 RX ADMIN — SILDENAFIL 20 MG: 20 TABLET, FILM COATED ORAL at 13:00

## 2023-04-16 RX ADMIN — MECLIZINE 12.5 MG: 12.5 TABLET ORAL at 12:56

## 2023-04-16 ASSESSMENT — COGNITIVE AND FUNCTIONAL STATUS - GENERAL
WALKING IN HOSPITAL ROOM: 3 - A LITTLE
MOVING TO AND FROM BED TO CHAIR: 3 - A LITTLE
CLIMB 3 TO 5 STEPS WITH RAILING: 2 - A LOT
STANDING UP FROM CHAIR USING ARMS: 3 - A LITTLE

## 2023-04-16 NOTE — PROGRESS NOTES
Internal Medicine  Daily Progress Note       SUBJECTIVE   This is a 62 y.o. year-old female admitted on 4/13/2023 with Cardiomegaly [I51.7]  Pulmonary fibrosis (CMS/HCC) [J84.10]  Pericardial effusion [I31.39].    Interval History: No acute events overnight.  Patient seen and examined at bedside this morning.  States that she continues to have chest discomfort although no worse than prior days.  Denies any further episodes of hemoptysis since admission.  Denies any shortness of breath.  Offers no other complaints at this time.     OBJECTIVE   Vital signs in last 24 hours:  Temp:  [36.3 °C (97.4 °F)-36.6 °C (97.9 °F)] 36.6 °C (97.8 °F)  Heart Rate:  [] 95  Resp:  [16-20] 20  BP: (121-153)/(59-66) 121/59  SpO2:  [94 %-96 %] 95 %  Oxygen Therapy: None (Room air)    Weight & I/Os:  Weights (last 5 days)     Date/Time Weight    04/16/23 0800 51.8 kg (114 lb 2.1 oz)    04/15/23 0600 51.8 kg (114 lb 3.2 oz)    04/14/23 1523 50 kg (110 lb 3.7 oz)    04/14/23 0447 50 kg (110 lb 3.2 oz)    04/14/23 0300 50 kg (110 lb 3.2 oz)    04/13/23 18:04:29 51.3 kg (113 lb)          Intake/Output Summary (Last 24 hours) at 4/16/2023 0659  Last data filed at 4/16/2023 0600  24 Hour Net Input/Output from 7AM Yesterday   Intake 300 ml   Output 700 ml   Net -400 ml        PHYSICAL EXAMINATION   Physical Exam  Vitals and nursing note reviewed.   Constitutional:       General: She is not in acute distress.     Appearance: She is normal weight. She is not ill-appearing, toxic-appearing or diaphoretic.   HENT:      Nose: No congestion or rhinorrhea.      Mouth/Throat:      Mouth: Mucous membranes are moist.      Pharynx: Oropharynx is clear. No oropharyngeal exudate.   Eyes:      General: No scleral icterus.     Conjunctiva/sclera: Conjunctivae normal.   Cardiovascular:      Rate and Rhythm: Normal rate and regular rhythm.      Pulses: Normal pulses.      Heart sounds: Normal heart sounds. No murmur heard.  Pulmonary:      Effort:  Pulmonary effort is normal. No respiratory distress.      Breath sounds: Normal breath sounds. No wheezing or rales.   Abdominal:      General: Abdomen is flat. Bowel sounds are normal. There is no distension.      Palpations: Abdomen is soft.   Musculoskeletal:         General: Deformity (multiple finger amputations) present.      Right lower leg: No edema.      Left lower leg: No edema.   Skin:     General: Skin is warm and dry.      Capillary Refill: Capillary refill takes less than 2 seconds.      Findings: No lesion or rash.   Neurological:      General: No focal deficit present.      Mental Status: She is alert and oriented to person, place, and time.   Psychiatric:         Mood and Affect: Mood normal.          LINES, CATHETERS, DRAINS, AIRWAYS, & WOUNDS   Lines, drains, airways, & wounds:  Peripheral IV (Adult) 04/13/23 Anterior;Left;Proximal Forearm (Active)   Number of days: 3        LABS, IMAGING, & TELE   Labs:  I have reviewed the patient's labs to the time of note. No new clinical concern.    Results from last 7 days   Lab Units 04/16/23  0427 04/15/23  0834 04/13/23  1949   WBC K/uL 9.39 10.12 7.12   HEMOGLOBIN g/dL 10.0* 10.6* 10.6*   HEMATOCRIT % 33.4* 34.4* 34.3*   PLATELETS K/uL 262 230 267       Results from last 7 days   Lab Units 04/15/23  2155 04/15/23  0834 04/14/23 2028 04/14/23  0544 04/13/23 1949   SODIUM mEQ/L 139 138 139   < > 140   POTASSIUM mEQ/L 4.0 4.4 4.2   < > 4.6   CHLORIDE mEQ/L 105 104 105   < > 107   CO2 mEQ/L 26 22 23   < > 25   BUN mg/dL 29* 25* 21*   < > 15   CREATININE mg/dL 1.0 1.1 1.1   < > 1.0   CALCIUM mg/dL 9.1 9.5 9.5   < > 9.4   ALBUMIN g/dL  --   --   --   --  3.0*   BILIRUBIN TOTAL mg/dL  --   --   --   --  0.2*   ALK PHOS IU/L  --   --   --   --  78   ALT IU/L  --   --   --   --  9*   AST IU/L  --   --   --   --  16   GLUCOSE mg/dL 85 79 146*   < > 96    < > = values in this interval not displayed.     Imaging personally reviewed (does not include unread  studies):  No results found.  Telemetry/EKG:  I have independently reviewed the telemetry. No events for the last 24 hours.     ASSESSMENT & PLAN   Pulmonary hypertension (CMS/[facility])  Assessment & Plan  Pulmonary Hypertension (WHO Group I)  Occurring in the background of Systemic Scleroderma with ILD.   August 2022 TTE showed normal RV size and function, but progressive exertional decline, worsened DLCO and resting tachycardia  Concern for progression of her pulmonary vascular disease and limitation of cardiac output.   Follows with Dr Arvizu.    - Continue Sildenafil 20 mg TID  - Macitentan prior approval in progress  - She is euvolemic on exam and by echocardiography. Would hold further diuresis  - Dr. Arvizu following      Hemoptysis  Assessment & Plan  Pt with history of hemoptysis at baseline but appears to be somewhat increased now on AC for PE    -hold apixaban, no need to restart given VQ scan iso of ILD  -Trend Hgb and transfuse as needed  -pulm following     Scleroderma (CMS/[facility])  Assessment & Plan  Not currently on any therapy   Cellcept has been suggested and refused in the past  I had extensive conversation with patient regarding the progressive nature of her scleroderma which is likely the driving factor in her pulmonary hypertension and resultant symptoms.  I expressed that without taking medication to slow the progression of her scleroderma her symptoms are likely to worsen.  I explained to her that her pulmonary hypertension medications may improve her symptoms somewhat however likely will worsen over time in the setting of her underlying disease.  We discussed risks and benefits of immunotherapy, patient declines immunotherapy at this point time citing increased risk of infection being in an reasonable risk.    * Dyspnea  Assessment & Plan  Has history of advanced scleroderma complicated by ILD and pulmonary hypertension.  Has a chronic baseline nonproductive cough, chronic hemoptysis and shortness of  breath.  Presenting with acute on chronic hemoptysis, worsening shortness of breath and chest pain.  No evidence of ACS.  Recent hospitalization at [facility] for similar symptoms, VQ scan was positive for PE (in the setting of her lung disease), she was sent home on apixaban.  Had no improvement in symptoms.  CTA at admission with no evidence of PE, moderate pericardial effusion, groundglass opacities mainly in the bases.  Resting tremor slightly worse.    Likely multifactorial etiology related to slight volume overload in the setting of chronic lung disease (pHTN) and CVA deconditioning/debility.    -Holding diuresis given patient does not examine volume overloaded and TTE unchanged from prior   -Hold apixaban given worsening hemoptysis and nosebleeds  - PT/OT consulted- Rec home with home health and home PT  - palliative team consulted-- pt opting for restorative care at this time     Anemia  Assessment & Plan  Hemoglobin of 10.6 from baseline of 11-12.  In the setting of worsening baseline hemoptysis.  MCV of 88.  Iron and iron sat low (29 and 12 respectively). B12 normal     - Start iron supp  -Hold apixaban      Acute on chronic heart failure with preserved ejection fraction (CMS/HCC)  Assessment & Plan  Not overloaded on exam or by Echo    - Hold further diuresis    Weight loss  Assessment & Plan  Reports up to 30 pounds weight loss over the past 6 months.    -Consult to nutrition  -Need age-appropriate cancer screening with discharge    Debility  Assessment & Plan  Pt/ot consulted- Rec home with home health care and home PT      Vertigo  Assessment & Plan  Continue meclizine as needed     History of pulmonary embolism  Assessment & Plan  hospitalized on 3/19/2023 at [facility] for shortness of breath and was diagnosed with a PE on VQ scan showing Right apical wedge shaped perfusion defect. She had negative upper and lower extremity US.  No evidence of PE on CTA.     - hold apixban  - pulm consulted, aj  recs    Pericardial effusion  Assessment & Plan  New compared to prior imaging  Unclear etiology    -Assess further with Echocardiogram    Raynaud's disease  Assessment & Plan  Hx Systemic Scleroderma / Raynaud's disease per Rheumatology (Dr. Giraldo).  Autoamputation of multiple fingers.     -continue CCB, outpatient follow up    Essential (primary) hypertension  Assessment & Plan  Continue Amlodipine.       VTE Assessment: Padua    VTE Prophylaxis: Current anticoagulants:  enoxaparin (LOVENOX) syringe 40 mg, subcutaneous, Daily (6p)      Code Status: Full Code  Estimated discharge date: 4/17/2023     ATTENDING DOCUMENTATION  ALSO SEE ATTENDING ATTESTATION SECTION OF NOTE

## 2023-04-16 NOTE — PLAN OF CARE
Problem: Fall Injury Risk  Goal: Absence of Fall and Fall-Related Injury  Outcome: Progressing     Problem: Skin Injury Risk Increased  Goal: Skin Health and Integrity  Outcome: Progressing     Problem: Mobility Impairment  Goal: Optimal Mobility  Outcome: Progressing     Problem: Heart Failure Comorbidity  Goal: Maintenance of Heart Failure Symptom Control  Outcome: Progressing     Problem: Pain Chronic (Persistent) (Comorbidity Management)  Goal: Acceptable Pain Control and Functional Ability  Outcome: Progressing

## 2023-04-16 NOTE — PLAN OF CARE
Problem: Adult Inpatient Plan of Care  Goal: Plan of Care Review  Outcome: Progressing  Flowsheets (Taken 4/16/2023 4090)  Progress: improving  Plan of Care Reviewed With:   patient   friend  Outcome Summary: VSS. NSR to Sinus Tachy on monitor. Room air. Complaints of generalized pain, received standing/PRN medication w/ some relief. Pt. also has K-pad under B/L LE. AAOx3. Assistx1 OOB. Weak DP pulses. No edema. Pt. skin taunt due to patient condition. Calm and pleasant. Bed alarm set. Callbell w/in reach.  Goal: Patient-Specific Goal (Individualized)  Outcome: Progressing  Goal: Absence of Hospital-Acquired Illness or Injury  Outcome: Progressing  Goal: Optimal Comfort and Wellbeing  Outcome: Progressing  Goal: Readiness for Transition of Care  Outcome: Progressing     Problem: Fall Injury Risk  Goal: Absence of Fall and Fall-Related Injury  Outcome: Progressing     Problem: Skin Injury Risk Increased  Goal: Skin Health and Integrity  Outcome: Progressing     Problem: Mobility Impairment  Goal: Optimal Mobility  Outcome: Progressing     Problem: Heart Failure Comorbidity  Goal: Maintenance of Heart Failure Symptom Control  Outcome: Progressing     Problem: Pain Chronic (Persistent) (Comorbidity Management)  Goal: Acceptable Pain Control and Functional Ability  Outcome: Progressing

## 2023-04-17 ENCOUNTER — APPOINTMENT (INPATIENT)
Dept: RADIOLOGY | Facility: HOSPITAL | Age: 63
DRG: 196 | End: 2023-04-17
Attending: STUDENT IN AN ORGANIZED HEALTH CARE EDUCATION/TRAINING PROGRAM
Payer: COMMERCIAL

## 2023-04-17 ENCOUNTER — APPOINTMENT (INPATIENT)
Dept: RADIOLOGY | Facility: HOSPITAL | Age: 63
DRG: 196 | End: 2023-04-17
Payer: COMMERCIAL

## 2023-04-17 LAB
ANION GAP SERPL CALC-SCNC: 8 MEQ/L (ref 3–15)
ATRIAL RATE: 86
BUN SERPL-MCNC: 25 MG/DL (ref 8–20)
CALCIUM SERPL-MCNC: 9.1 MG/DL (ref 8.9–10.3)
CHLORIDE SERPL-SCNC: 107 MEQ/L (ref 98–109)
CO2 SERPL-SCNC: 25 MEQ/L (ref 22–32)
CREAT SERPL-MCNC: 1 MG/DL (ref 0.6–1.1)
ERYTHROCYTE [DISTWIDTH] IN BLOOD BY AUTOMATED COUNT: 16.1 % (ref 11.7–14.4)
GFR SERPL CREATININE-BSD FRML MDRD: >60 ML/MIN/1.73M*2
GLUCOSE SERPL-MCNC: 78 MG/DL (ref 70–99)
HCT VFR BLDCO AUTO: 32.5 % (ref 35–45)
HGB BLD-MCNC: 9.7 G/DL (ref 11.8–15.7)
MAGNESIUM SERPL-MCNC: 2 MG/DL (ref 1.8–2.5)
MCH RBC QN AUTO: 26.9 PG (ref 28–33.2)
MCHC RBC AUTO-ENTMCNC: 29.8 G/DL (ref 32.2–35.5)
MCV RBC AUTO: 90.3 FL (ref 83–98)
P AXIS: 54
PDW BLD AUTO: 12.2 FL (ref 9.4–12.3)
PLATELET # BLD AUTO: 264 K/UL (ref 150–369)
POTASSIUM SERPL-SCNC: 4.5 MEQ/L (ref 3.6–5.1)
PR INTERVAL: 136
QRS DURATION: 100
QT INTERVAL: 392
QTC CALCULATION(BAZETT): 469
R AXIS: 26
RBC # BLD AUTO: 3.6 M/UL (ref 3.93–5.22)
SODIUM SERPL-SCNC: 140 MEQ/L (ref 136–144)
T WAVE AXIS: 80
VENTRICULAR RATE: 86
WBC # BLD AUTO: 8.42 K/UL (ref 3.8–10.5)

## 2023-04-17 PROCEDURE — 63700000 HC SELF-ADMINISTRABLE DRUG: Performed by: STUDENT IN AN ORGANIZED HEALTH CARE EDUCATION/TRAINING PROGRAM

## 2023-04-17 PROCEDURE — 71045 X-RAY EXAM CHEST 1 VIEW: CPT | Mod: 59

## 2023-04-17 PROCEDURE — 85027 COMPLETE CBC AUTOMATED: CPT | Performed by: STUDENT IN AN ORGANIZED HEALTH CARE EDUCATION/TRAINING PROGRAM

## 2023-04-17 PROCEDURE — G0378 HOSPITAL OBSERVATION PER HR: HCPCS

## 2023-04-17 PROCEDURE — 97530 THERAPEUTIC ACTIVITIES: CPT | Mod: GP,CQ

## 2023-04-17 PROCEDURE — 99233 SBSQ HOSP IP/OBS HIGH 50: CPT | Performed by: HOSPITALIST

## 2023-04-17 PROCEDURE — 93010 ELECTROCARDIOGRAM REPORT: CPT | Performed by: INTERNAL MEDICINE

## 2023-04-17 PROCEDURE — 93971 EXTREMITY STUDY: CPT | Mod: LT

## 2023-04-17 PROCEDURE — 80048 BASIC METABOLIC PNL TOTAL CA: CPT

## 2023-04-17 PROCEDURE — 63700000 HC SELF-ADMINISTRABLE DRUG

## 2023-04-17 PROCEDURE — 21400000 HC ROOM AND CARE CCU/INTERMEDIATE

## 2023-04-17 PROCEDURE — 97166 OT EVAL MOD COMPLEX 45 MIN: CPT | Mod: GO

## 2023-04-17 PROCEDURE — 99232 SBSQ HOSP IP/OBS MODERATE 35: CPT | Performed by: INTERNAL MEDICINE

## 2023-04-17 PROCEDURE — 36415 COLL VENOUS BLD VENIPUNCTURE: CPT | Performed by: STUDENT IN AN ORGANIZED HEALTH CARE EDUCATION/TRAINING PROGRAM

## 2023-04-17 PROCEDURE — 25000000 HC PHARMACY GENERAL

## 2023-04-17 PROCEDURE — 71046 X-RAY EXAM CHEST 2 VIEWS: CPT

## 2023-04-17 PROCEDURE — 83735 ASSAY OF MAGNESIUM: CPT

## 2023-04-17 PROCEDURE — 63600000 HC DRUGS/DETAIL CODE: Mod: JZ | Performed by: STUDENT IN AN ORGANIZED HEALTH CARE EDUCATION/TRAINING PROGRAM

## 2023-04-17 PROCEDURE — 63600000 HC DRUGS/DETAIL CODE: Mod: JZ

## 2023-04-17 RX ORDER — ONDANSETRON 4 MG/1
4 TABLET, ORALLY DISINTEGRATING ORAL ONCE
Status: ACTIVE | OUTPATIENT
Start: 2023-04-18 | End: 2023-04-18

## 2023-04-17 RX ORDER — HYDROCORTISONE 25 MG/G
CREAM TOPICAL 3 TIMES DAILY PRN
Status: DISCONTINUED | OUTPATIENT
Start: 2023-04-17 | End: 2023-04-19 | Stop reason: HOSPADM

## 2023-04-17 RX ORDER — DIPHENHYDRAMINE HCL 25 MG
25 CAPSULE ORAL ONCE
Status: DISPENSED | OUTPATIENT
Start: 2023-04-17 | End: 2023-04-17

## 2023-04-17 RX ORDER — FUROSEMIDE 10 MG/ML
20 INJECTION INTRAMUSCULAR; INTRAVENOUS ONCE
Status: COMPLETED | OUTPATIENT
Start: 2023-04-17 | End: 2023-04-17

## 2023-04-17 RX ADMIN — FUROSEMIDE 20 MG: 10 INJECTION, SOLUTION INTRAMUSCULAR; INTRAVENOUS at 16:51

## 2023-04-17 RX ADMIN — SILDENAFIL 20 MG: 20 TABLET, FILM COATED ORAL at 23:16

## 2023-04-17 RX ADMIN — ACETAMINOPHEN 650 MG: 325 TABLET ORAL at 08:20

## 2023-04-17 RX ADMIN — ACETAMINOPHEN 650 MG: 325 TABLET ORAL at 02:57

## 2023-04-17 RX ADMIN — LEVOTHYROXINE SODIUM 100 MCG: 0.1 TABLET ORAL at 06:28

## 2023-04-17 RX ADMIN — MECLIZINE 12.5 MG: 12.5 TABLET ORAL at 12:53

## 2023-04-17 RX ADMIN — SILDENAFIL 20 MG: 20 TABLET, FILM COATED ORAL at 13:03

## 2023-04-17 RX ADMIN — LIDOCAINE HYDROCHLORIDE 2 ML: 20 SOLUTION ORAL; TOPICAL at 17:03

## 2023-04-17 RX ADMIN — Medication 3 MG: at 23:16

## 2023-04-17 RX ADMIN — CYANOCOBALAMIN TAB 1000 MCG 1000 MCG: 1000 TAB at 08:20

## 2023-04-17 RX ADMIN — SILDENAFIL 20 MG: 20 TABLET, FILM COATED ORAL at 08:20

## 2023-04-17 RX ADMIN — ENOXAPARIN SODIUM 40 MG: 40 INJECTION SUBCUTANEOUS at 17:03

## 2023-04-17 RX ADMIN — AMLODIPINE BESYLATE 10 MG: 10 TABLET ORAL at 08:20

## 2023-04-17 ASSESSMENT — COGNITIVE AND FUNCTIONAL STATUS - GENERAL
STANDING UP FROM CHAIR USING ARMS: 3 - A LITTLE
CLIMB 3 TO 5 STEPS WITH RAILING: 2 - A LOT
WALKING IN HOSPITAL ROOM: 3 - A LITTLE
MOVING TO AND FROM BED TO CHAIR: 3 - A LITTLE
HELP NEEDED FOR BATHING: 2 - A LOT
STANDING UP FROM CHAIR USING ARMS: 3 - A LITTLE
TOILETING: 2 - A LOT
AFFECT: WFL;SAD/DEPRESSED AFFECT
MOVING TO AND FROM BED TO CHAIR: 3 - A LITTLE
WALKING IN HOSPITAL ROOM: 3 - A LITTLE
CLIMB 3 TO 5 STEPS WITH RAILING: 2 - A LOT
AFFECT: WFL;SAD/DEPRESSED AFFECT
EATING MEALS: 2 - A LOT
HELP NEEDED FOR PERSONAL GROOMING: 2 - A LOT
DRESSING REGULAR LOWER BODY CLOTHING: 2 - A LOT
DRESSING REGULAR UPPER BODY CLOTHING: 2 - A LOT

## 2023-04-17 NOTE — PROGRESS NOTES
[facility]: Chart reviewed. Patient is current with [facility] per [facility], last seen 4/11/23.  EZEKIEL/Garrick Sarmiento notified. No further intervention by [facility].  Angela Hamm RN [facility] [id number]

## 2023-04-17 NOTE — PROGRESS NOTES
Notified by nursing that patient was reporting chest pain. Patient originally presented with chest pain that has been attributed to scleroderma associated ILD and PAH. Saw and assessed patient at bedside. Patient reports that her chest pain is the chest discomfort that she has had throughout her stay however is is no worse than prior days. She also notes some shortness of breath. Patient's symptoms are likely the same symptoms she has been experiencing daily from her scleroderma. CXR and EKG ordered for completeness.

## 2023-04-17 NOTE — PROGRESS NOTES
Patient: Arielle Felix  Location: [place] [address]  MRN:  [id number]  Today's date:  4/17/2023    Attempted to see patient for therapy. Unable due to patient at test or procedure (Pt off unit at US. Unable to see for OT eval at this time.).

## 2023-04-17 NOTE — PROGRESS NOTES
Internal Medicine  Daily Progress Note       SUBJECTIVE   This is a 62 y.o. year-old female admitted on 4/13/2023 with Cardiomegaly [I51.7]  Pulmonary fibrosis (CMS/HCC) [J84.10]  Pericardial effusion [I31.39].    Interval History: Seen and examined at bedside. No acute events overnight. Ongoing dyspnea at rest. Hemoptysis has resolved. ROS otherwise is negative.      OBJECTIVE   Vital signs in last 24 hours:  Temp:  [36.3 °C (97.4 °F)-36.8 °C (98.2 °F)] 36.6 °C (97.9 °F)  Heart Rate:  [] 92  Resp:  [20] 20  BP: (117-131)/(58-69) 117/60  SpO2:  [94 %-100 %] 100 %  Oxygen Therapy: None (Room air)    Weight & I/Os:  Weights (last 5 days)     Date/Time Weight    04/17/23 0635 51.7 kg (113 lb 14.4 oz)    04/16/23 0800 51.8 kg (114 lb 2.1 oz)    04/15/23 0600 51.8 kg (114 lb 3.2 oz)    04/14/23 1523 50 kg (110 lb 3.7 oz)    04/14/23 0447 50 kg (110 lb 3.2 oz)    04/14/23 0300 50 kg (110 lb 3.2 oz)    04/13/23 18:04:29 51.3 kg (113 lb)          Intake/Output Summary (Last 24 hours) at 4/17/2023 0659  Last data filed at 4/16/2023 1600  24 Hour Net Input/Output from 7AM Yesterday   Intake 670 ml   Output 400 ml   Net 270 ml        PHYSICAL EXAMINATION   Physical Exam  Vitals reviewed.   Constitutional:       Appearance: Normal appearance.   HENT:      Head: Normocephalic and atraumatic.   Cardiovascular:      Rate and Rhythm: Normal rate and regular rhythm.      Pulses: Normal pulses.      Heart sounds: Normal heart sounds.   Pulmonary:      Effort: Pulmonary effort is normal.      Breath sounds: Normal breath sounds.   Abdominal:      General: Abdomen is flat. Bowel sounds are normal. There is no distension.      Palpations: Abdomen is soft.      Tenderness: There is no guarding or rebound.   Musculoskeletal:      Right lower leg: No edema.      Left lower leg: No edema.      Comments: Multiple amputated fingers   Skin:     General: Skin is warm.      Comments:  Vitiligo noted on forehead     Neurological:       General: No focal deficit present.      Mental Status: She is alert and oriented to person, place, and time. Mental status is at baseline.      Cranial Nerves: No cranial nerve deficit.      Sensory: No sensory deficit.      Motor: No weakness.   Psychiatric:         Mood and Affect: Mood normal.         Behavior: Behavior normal.         Thought Content: Thought content normal.         Judgment: Judgment normal.          LINES, CATHETERS, DRAINS, AIRWAYS, & WOUNDS   Lines, drains, airways, & wounds:  Peripheral IV (Adult) 04/13/23 Anterior;Left;Proximal Forearm (Active)   Number of days: 4       External Urinary Catheter (Active)   Number of days: 2        LABS, IMAGING, & TELE   Labs:    Results from last 7 days   Lab Units 04/17/23  0539 04/16/23  0427 04/15/23  0834   WBC K/uL 8.42 9.39 10.12   HEMOGLOBIN g/dL 9.7* 10.0* 10.6*   HEMATOCRIT % 32.5* 33.4* 34.4*   PLATELETS K/uL 264 262 230       Results from last 7 days   Lab Units 04/17/23  0539 04/16/23  1201 04/15/23  2155 04/14/23  0544 04/13/23  1949   SODIUM mEQ/L 140 138 139   < > 140   POTASSIUM mEQ/L 4.5 4.1 4.0   < > 4.6   CHLORIDE mEQ/L 107 105 105   < > 107   CO2 mEQ/L 25 25 26   < > 25   BUN mg/dL 25* 27* 29*   < > 15   CREATININE mg/dL 1.0 0.9 1.0   < > 1.0   CALCIUM mg/dL 9.1 9.1 9.1   < > 9.4   ALBUMIN g/dL  --   --   --   --  3.0*   BILIRUBIN TOTAL mg/dL  --   --   --   --  0.2*   ALK PHOS IU/L  --   --   --   --  78   ALT IU/L  --   --   --   --  9*   AST IU/L  --   --   --   --  16   GLUCOSE mg/dL 78 84 85   < > 96    < > = values in this interval not displayed.     Imaging personally reviewed (does not include unread studies):  No results found.  Telemetry/EKG:  I have independently reviewed the telemetry. Significant findings include sinus tachycardia.     ASSESSMENT & PLAN   * Dyspnea  Assessment & Plan  Has history of advanced scleroderma complicated by ILD and pulmonary hypertension.  Has a chronic baseline nonproductive cough, chronic  hemoptysis and shortness of breath.  Presenting with acute on chronic hemoptysis, worsening shortness of breath and chest pain.  No evidence of ACS.  Recent hospitalization at [facility] for similar symptoms, VQ scan was positive for PE (in the setting of her lung disease), she was sent home on apixaban.  Had no improvement in symptoms.  CTA at admission with no evidence of PE, moderate pericardial effusion, groundglass opacities mainly in the bases.  Resting tremor slightly worse.    Likely multifactorial etiology related to slight volume overload in the setting of chronic lung disease (pHTN) and CVA deconditioning/debility.    -Holding diuresis given patient does not examine volume overloaded and TTE unchanged from prior   -Hold apixaban given worsening hemoptysis and nosebleeds  - PT/OT consulted- Rec home with home health and home PT  - palliative team consulted-- pt opting for restorative care at this time     History of pulmonary embolism  Assessment & Plan  hospitalized on 3/19/2023 at [facility] [place] for shortness of breath and was diagnosed with a PE on VQ scan showing Right apical wedge shaped perfusion defect. She had negative upper and lower extremity US.  No evidence of PE on CTA.     - hold apixban  - pulm consulted, aj recs    Pulmonary hypertension (CMS/HCC)  Assessment & Plan  Pulmonary Hypertension (WHO Group I)  Occurring in the background of Systemic Scleroderma with ILD.   August 2022 TTE showed normal RV size and function, but progressive exertional decline, worsened DLCO and resting tachycardia  Concern for progression of her pulmonary vascular disease and limitation of cardiac output.   Follows with Dr Arvizu.    - Continue Sildenafil 20 mg TID  - Macitentan prior approval in progress  - She is euvolemic on exam and by echocardiography. Would hold further diuresis  - Dr. Arvizu following      Anemia  Assessment & Plan  Hemoglobin of 10.6 from baseline of 11-12.  In the setting of worsening  baseline hemoptysis.  MCV of 88.  Iron and iron sat low (29 and 12 respectively). B12 normal     - Start iron supp  -Hold apixaban      Acute on chronic heart failure with preserved ejection fraction (CMS/HCC)  Assessment & Plan  Not overloaded on exam or by Echo    - Hold further diuresis    Weight loss  Assessment & Plan  Reports up to 30 pounds weight loss over the past 6 months.    -Consult to nutrition  -Need age-appropriate cancer screening with discharge    Debility  Assessment & Plan  Pt/ot consulted- Rec home with home health care and home PT      Vertigo  Assessment & Plan  Continue meclizine as needed     Hemoptysis  Assessment & Plan  Pt with history of hemoptysis at baseline but appears to be somewhat increased now on AC for PE    -hold apixaban, no need to restart given VQ scan iso of ILD  -Trend Hgb and transfuse as needed  -pulm following     Pericardial effusion  Assessment & Plan  New compared to prior imaging  Unclear etiology    -Assess further with Echocardiogram    Scleroderma (CMS/HCC)  Assessment & Plan  Not currently on any therapy   Cellcept has been suggested and refused in the past.    -Needs outpatient follow up    Raynaud's disease  Assessment & Plan  Hx Systemic Scleroderma / Raynaud's disease per Rheumatology (Dr. Giraldo).  Autoamputation of multiple fingers.     -continue CCB, outpatient follow up    Essential (primary) hypertension  Assessment & Plan  Continue Amlodipine.       VTE Assessment: Pad[place]    VTE Prophylaxis: Current anticoagulants:  enoxaparin (LOVENOX) syringe 40 mg, subcutaneous, Daily (6p)      Code Status: Full Code  Estimated discharge date: 4/17/2023     ATTENDING DOCUMENTATION  ALSO SEE ATTENDING ATTESTATION SECTION OF NOTE

## 2023-04-17 NOTE — PROGRESS NOTES
Occupational Therapy -  Initial Evaluation     Patient: Arielle GUAJARDO Isidro  Location: [address] [address]  MRN: [id number]  Today's date: 4/17/2023    HISTORY OF PRESENT ILLNESS     Arielle is a 62 y.o. female admitted on 4/13/2023 with Cardiomegaly [I51.7]  Pulmonary fibrosis (CMS/HCC) [J84.10]  Pericardial effusion [I31.39]. Principal problem is Dyspnea.    Past Medical History  Arielle has a past medical history of De Quervain's tenosynovitis, Essential (primary) hypertension, Follicular carcinoma of thyroid (CMS/HCC), Gastro-esophageal reflux, Hand ulceration (CMS/HCC), Hyperlipidemia, unspecified, Interstitial lung disease (CMS/HCC), Leg ulcer (CMS/HCC), Lung nodule, Lupus (CMS/HCC), Osteomyelitis of hand (CMS/HCC), Osteoporosis, Pulmonary hypertension (CMS/HCC), Raynaud's disease, Reflux esophagitis, Scleroderma (CMS/HCC), and Screening mammogram for breast cancer (05/04/2021).    History of Present Illness   Adm with chest pain --> demand ischemia, most likely related to scleroderma associated ILD, PAH, hemoptysis; + pleural effusion and cardiomyopathy  -US neg DVT, CT chest without PE.     PRIOR LEVEL OF FUNCTION AND LIVING ENVIRONMENT     Prior Level of Function    Flowsheet Row Most Recent Value   Ambulation independent   Transferring assistive person   Toileting assistive equipment   Bathing assistive equipment and person   Dressing assistive person   Driving/Transportation friends/family   Prior Level of Function Comment pt amb without AD,  has HHA 18 hrs/day 7 days/week --> occ assists with transfers, assist with dressing and bathing, prepares food   Assistive Device/Animal Currently Used at Home none        Prior Living Environment    Flowsheet Row Most Recent Value   People in Home alone   Current Living Arrangements home   Home Accessibility stairs to enter home (Group), stairs within home (Group)   Living Environment Comment lives alone in 2SH (has HHA),  4STE with rail,   stair glide to bed and tub shower on 2nd floor          VITALS AND PAIN     OT Vitals    Date/Time Pulse HR Source SpO2 Pt Activity O2 Therapy BP MAP BP Location BP Method Pt Position [facility]   04/17/23 1458 98 Monitor 97 % At rest None (Room air) 125/59 85 mmHg Right upper arm Automatic Lying CF      OT Pain    Date/Time Pain Type Acceptable Pain Level Side/Orientation Rating: Rest Rating: Activity Interventions [facility]   04/17/23 1458 Pain Assessment 0 generalized 8 8 position adjusted CF        Objective   OBJECTIVE     Start time:  1455  End time:  1510  Session Length: 15 min  Mode of Treatment: co-treatment, occupational therapy    General Observations  Patient received supine, in bed. She was agreeable to therapy, no issues or concerns identified by nurse prior to session. RN cleared. PTA present for cotx.    Precautions: fall       Limitations/Impairments:  (FMC deficits d/t auto amputation of 10/10 digits)      OT Eval and Treat - 04/17/23 1455        Cognition    Orientation Status oriented x 4     Affect/Mental Status WFL;sad/depressed affect     Follows Commands WFL     Cognitive Function WFL     Comment, Cognition Cog appears to be grossly at baseline. Pt emotional while sitting EOB, sad/depressed affect. Encouragement and active listening provided.        Vision Assessment/Intervention    Vision Assessment WFL;corrective lenses for reading        Hearing Assessment    Hearing Status WFL        Sensory Assessment    Sensory Assessment sensation intact except     Sensation Impaired Location(s) left UE;right UE   +reports BLE neuropathy    Sensory Subjective Reports burning   in b/l hands, all digits       Upper Extremity Assessment    UE Assessment WFL except;Left Hand;Right Hand        Left Hand    Hand, Left (ROM) Auto amputation of all digits. 4th and 5th digits with flexion contracture. ROM <1/2 full range.        Right Hand    Hand, Right (ROM) Auto amputation of all digits. ROM <1/2 full range        Motor  Skills    Coordination fine motor deficit;bilateral;severe impairment        Bed Mobility    Bed Mobility Activities left;supine to sit;sit to supine     [place] --   supine>sit, CLS. sit>supine, Francois.    Safety/Cues increased time to complete     Assistive Device bed rails        Mobility Belt    Mobility Belt Used for All Out of Bed Activity no     Reason Mobility Belt Not Used other (see comments)     Reason Mobility Belt Not Used deferred        Sit/Stand Transfer    Surface edge of bed     [place] minimum assist (75% or more patient effort);1 person assist     Safety/Cues increased time to complete     Assistive Device none     Transfer Comments Unable to use AD d/t hand/digit deficits        Functional Mobility    Distance few steps at bedside   3ft towards HOB only. Pt declining further mobility. MinA1 to perform with HHA. Encouraged Ax1 at home via HHA to maintain safety.    Functional Mobility [place] minimum assist (75% or more patient effort);1 person assist     Safety/Cues increased time to complete     Assistive Device none        Toileting    [place] dependent (less than 25% patient effort)     Position sitting up in bed     Comment +purewick        Self-Feeding    Tasks liquids to mouth     [place] minimum assist (75% or more patient effort)     Safety/Cues increased time to complete     Position edge of bed sitting     Setup Assistance prepare tray/open items   needs assist to open items/manage prep of items, d/t FMC deficits.    Comment Able to hold cup using b/l hands grossly (no functional grasp) to bring to mouth.        Balance    Static Sitting Balance WFL;sitting, edge of bed     Dynamic Sitting Balance WFL;sitting, edge of bed     Sit to Stand Dynamic Balance mild impairment     Static Standing Balance mild impairment;supported        Safety Issues    Impairments Affecting Function balance;coordination;endurance/activity tolerance;grasp;range of motion  (ROM);sensation/sensory awareness;strength;shortness of breath                               Education Documentation  Safety, taught by Zohra Mccarthy OT at 4/17/2023  3:26 PM.  Learner: Patient  Readiness: Acceptance  Method: Explanation  Response: Verbalizes Understanding  Comment: OT POC, OTs role, ADLs, safety at home, HHA for all txfers and mobility        Session Outcome  Patient in bed at end of session, bed alarm on, all needs met, call light in reach, personal items in reach. Nursing notified about patient's performance, patient's position, and patient's response to therapy/activity.    AM-PAC - ADL (Current Function)     Putting on/taking off regular lower body clothing 2 - A Lot   Bathing 2 - A Lot   Toileting 2 - A Lot   Putting on/taking off regular upper body clothing 2 - A Lot   Help for taking care of personal grooming 2 - A Lot   Eating meals 2 - A Lot   AM-PAC ADL Score 12      ASSESSMENT AND PLAN     Progress Summary  OT eval completed, Christyl is limited by baseline FMC deficits and dec'd strength/endurance/balance. FMC limit bimanual skills and ADL performance. Francois for self-feeding task, TD for toileting task with use of purewick. Able to perform bed mobility at Francois, able to perform STS and few feet of mobility at Francois as well. 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 Pt has HHA at home who assists with all ADLs and IADLs. Cont OT in acute setting, rec home with cont'd assist and home therapy.    Patient/Family Therapy Goal Statement: To go home    OT Plan    Flowsheet Row Most Recent Value   Rehab Potential good, to achieve stated therapy goals at 04/17/2023 1455   Therapy Frequency 3-5 times/wk at 04/17/2023 1455   Planned Therapy Interventions activity tolerance training, BADL retraining, functional balance retraining, occupation/activity based interventions, ROM/therapeutic exercise, strengthening exercise, transfer/mobility retraining at 04/17/2023 1455          OT  Discharge Recommendations    Flowsheet Row Most Recent Value   OT Recommended Discharge Disposition home with assistance, home with home health at 04/17/2023 1455   Anticipated Equipment Needs At Discharge (OT) other (see comments)  [owns all needed DME] at 04/17/2023 1455               OT Goals    Flowsheet Row Most Recent Value   Bed Mobility Goal 1    Activity/Assistive Device bed mobility activities, all at 04/17/2023 1455   [place] supervision required at 04/17/2023 1455   Time Frame short-term goal (STG) at 04/17/2023 1455   Progress/Outcome goal ongoing at 04/17/2023 1455   Transfer Goal 1    Activity/Assistive Device sit-to-stand/stand-to-sit, bed-to-chair/chair-to-bed at 04/17/2023 1455   [place] supervision required at 04/17/2023 1455   Time Frame short-term goal (STG) at 04/17/2023 1455   Progress/Outcome goal ongoing at 04/17/2023 1455   Toileting Goal 1    Activity/Assistive Device toileting skills, all at 04/17/2023 1455   [place] minimum assist (75% or more patient effort) at 04/17/2023 1455   Time Frame short-term goal (STG) at 04/17/2023 1455   Progress/Outcome goal ongoing at 04/17/2023 1455   Self-Feeding Goal 1    Activity/Adaptive Equipment self-feeding skills, all at 04/17/2023 1455   [place] minimum assist (75% or more patient effort) at 04/17/2023 1455   Time Frame short-term goal (STG) at 04/17/2023 1455   Progress/Outcome goal ongoing at 04/17/2023 1455

## 2023-04-17 NOTE — PROGRESS NOTES
Patient: Arielle GUAJARDO Isidro  Location:  [facility] [address]  MRN:  [id number]  Today's date:  4/17/2023    Arielle is a 62 y.o. female admitted on 4/13/2023 with Cardiomegaly [I51.7]  Pulmonary fibrosis (CMS/HCC) [J84.10]  Pericardial effusion [I31.39]. Principal problem is Dyspnea.    Past Medical History  Arielle has a past medical history of De Quervain's tenosynovitis, Essential (primary) hypertension, Follicular carcinoma of thyroid (CMS/HCC), Gastro-esophageal reflux, Hand ulceration (CMS/HCC), Hyperlipidemia, unspecified, Interstitial lung disease (CMS/HCC), Leg ulcer (CMS/HCC), Lung nodule, Lupus (CMS/HCC), Osteomyelitis of hand (CMS/HCC), Osteoporosis, Pulmonary hypertension (CMS/HCC), Raynaud's disease, Reflux esophagitis, Scleroderma (CMS/HCC), and Screening mammogram for breast cancer (05/04/2021).    History of Present Illness   Adm with chest pain --> demand ischemia, most likely related to scleroderma associated ILD, PAH, hemoptysis; + pleural effusion and cardiomyopathy  -US neg DVT, CT chest without PE.       PT Vitals    Date/Time Pulse HR Source SpO2 Pt Activity O2 Therapy BP MAP BP Location BP Method Pt Position [facility]   04/17/23 1458 98 Monitor 97 % At rest None (Room air) 125/59 85 mmHg Right upper arm Automatic Lying CF      PT Pain    Date/Time Pain Type Acceptable Pain Level Side/Orientation Rating: Rest Rating: Activity Interventions [facility]   04/17/23 1458 Pain Assessment 0 generalized 8 8 position adjusted CF          Prior Living Environment    Flowsheet Row Most Recent Value   People in Home alone   Current Living Arrangements home   Home Accessibility stairs to enter home (Group), stairs within home (Group)   Living Environment Comment lives alone in 2SH (has HHA),  4STE with rail,  stair glide to bed and tub shower on 2nd floor        Prior Level of Function    Flowsheet Row Most Recent Value   Ambulation independent   Transferring assistive person   Toileting  assistive equipment   Bathing assistive equipment and person   Dressing assistive person   Driving/Transportation friends/family   Prior Level of Function Comment pt amb without AD,  has HHA 18 hrs/day 7 days/week --> occ assists with transfers, assist with dressing and bathing, prepares food   Assistive Device/Animal Currently Used at Home none           PT Evaluation and Treatment - 04/17/23 1456        PT Time Calculation    Start Time 1456     Stop Time 1510     Time Calculation (min) 14 min        General Information    Document Type Daily Treatment/Progress Note     Mode of Treatment physical therapy;co-treatment   Patient seen with skilled PT/OT overlap to maximize patient's participation.    Position at Start of Session supine;in bed     Status at Start of Session agreeable to therapy;no issues or concerns identified by nurse prior to session     General Observations of Patient RN cleared for therapy. Received in supine; +purwick. Gave verbal consent for treatment. Reports 8/10 generalized pain.        Precautions/Limitations/Impairments    Existing Precautions/Restrictions fall        Cognition/Psychosocial    Affect/Mental Status WFL;sad/depressed affect     Orientation Status oriented x 4     Follows Commands WFL     Cognitive Function WFL     Comment, Cognition Patient is pleasant and cooperative for therapy. Initially agreeable for ambulation, however becomes overwhelmed with PLOF questions and requests to return to supine.        Bed Mobility    Bed Mobility Activities supine to sit;sit to supine     [place] minimum assist (75% or more patient effort)     Assistive Device head of bed elevated     Comment OOB to L; reports dizziness/vertigo upon sitting. Patient able to exit bed with CSV. Requires Kari for BLE to return to supine.        Mobility Belt    Mobility Belt Used for All Out of Bed Activity no     Reason Mobility Belt Not Used patient refused     Reason Mobility Belt Not Used Patient  refused belt and ambulation; side by side assist provided.        Transfers    Comment Kari for transfers with HHA.        Bed to Chair Transfer    Comment Patient declined transfer.        Sit to Stand Transfer    [place], Sit to Stand Transfer minimum assist (75% or more patient effort)     Assistive Device other (see comments)   HHA    Comment From EOB x1; patient requires Kari for upward forced production and initial balance.        Stand to Sit Transfer    [place], Stand to Sit Transfer minimum assist (75% or more patient effort)     Assistive Device other (see comments)   HHA    Comment To EOB x1. Fair (+) eccentric control.        Gait Training    [place], Gait minimum assist (75% or more patient effort)     Safety/Cues minimal;verbal cues;sequencing;technique     Assistive Device other (see comments)   HHA    Distance in Feet 2 feet     Pattern step-to     Comment (Gait/Stairs) Patient able to side step to L toward HOB with Kari. Deferred further ambulation.        Safety Issues    Impairments Affecting Function balance;endurance/activity tolerance        Balance    Static Sitting Balance WFL;sitting, edge of bed     Dynamic Sitting Balance mild impairment;sitting, edge of bed     Sit to Stand Dynamic Balance mild impairment;supported     Static Standing Balance mild impairment;supported     Dynamic Standing Balance mild impairment;supported     Comment, Balance Patient benefits from assist with standing and side stepping at EOB. No overt LOB during session.        AM-PAC - Mobility (Current Function)    Turning from your back to your side while in a flat bed without using bedrails? 3 - A Little     Moving from lying on your back to sitting on the side of a flat bed without using bedrails? 3 - A Little     Moving to and from a bed to a chair? 3 - A Little     Standing up from a chair using your arms? 3 - A Little     To walk in a hospital room? 3 - A Little     Climbing 3-5 steps with a  railing? 2 - A Lot     AM-PAC Mobility Score 17        Session Outcome    Daily Outcome Statement [facility] 17/24: Patient requires Kari for bed mobility, transfers, and side stepping at EOB. Limited by mild balance impairments. Can benefit from continued skilled therapy intervention to address deficits and maximize functional independence.     Position at End of Session supine;in bed     Status at End of Session all needs met;call light in reach;personal items in reach     Nursing Notified patient's performance;patient's position;patient's response to therapy/activity        Plan    Rehab Potential good, to achieve stated therapy goals     Therapy Frequency 3-5 times/wk     Planned Therapy Interventions balance training;bed mobility training;gait training;home exercise program;patient/family education;postural re-education;stair training;strengthening;transfer training               PT Discharge Recommendations    Flowsheet Row Most Recent Value   PT Recommended Discharge Disposition home with assistance, home with home health at 04/17/2023 1456   Anticipated Equipment Needs at Discharge (PT) none at 04/17/2023 1456   Patient/Family Therapy Goals Statement to return to PLOF at 04/14/2023 1131                       PT Goals    Flowsheet Row Most Recent Value   Bed Mobility Goal 1    Activity/Assistive Device rolling to left, rolling to right, sit to supine, supine to sit at 04/14/2023 1131   [place] independent at 04/14/2023 1131   Time Frame by discharge at 04/14/2023 1131   Progress/Outcome goal ongoing at 04/14/2023 1131   Transfer Goal 1    Activity/Assistive Device sit-to-stand/stand-to-sit, bed-to-chair/chair-to-bed at 04/14/2023 1131   [place] supervision required at 04/14/2023 1131   Time Frame by discharge at 04/14/2023 1131   Progress/Outcome goal ongoing at 04/14/2023 1131   Gait Training Goal 1    Activity/Assistive Device gait (walking locomotion) at 04/14/2023 1131   [place] supervision  required at 04/14/2023 1131   Distance 100 at 04/14/2023 1131   Time Frame by discharge at 04/14/2023 1131   Progress/Outcome goal ongoing at 04/14/2023 1131   Stairs Goal 1    Activity/Assistive Device ascending stairs, descending stairs, using handrail, left at 04/14/2023 1131   [place] supervision required at 04/14/2023 1131   Number of Stairs 4 at 04/14/2023 1131   Time Frame by discharge at 04/14/2023 1131   Progress/Outcome goal ongoing at 04/14/2023 1131

## 2023-04-17 NOTE — PLAN OF CARE
Problem: Adult Inpatient Plan of Care  Goal: Plan of Care Review  Outcome: Progressing  Flowsheets (Taken 4/17/2023 1526)  Progress: improving  Plan of Care Reviewed With: patient  Outcome Summary: OT mertal completed, Arielle is limited by baseline FMC deficits and dec'd strength/endurance/balance. FMC limit bimanual skills and ADL performance. [place] for self-feeding task, TD for toileting task with use of purewick. Able to perform bed mobility at Franc[place], able to perform STS and few feet of mobility at [place] as well. 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 Pt has HHA at home who assists with all ADLs and IADLs. Cont OT in acute setting, rec home with cont'd assist and home therapy.

## 2023-04-17 NOTE — NURSING NOTE
Pt initially c/o chest pain.  MD notified.  EKG ordered and done. Chest Xray also ordered.  Warm compressor applied to left AC following to c/o of pain in the area.  MD currently at bedside due to pt c/o persistent chest pain and entire left sided pain. Tylenol administered.  Will continue to monitor.

## 2023-04-17 NOTE — PROGRESS NOTES
Heart Failure / Pulmonary Hypertension Cardiology Consult Note    Interval History: examined laying in bed, reports she's more short of breath this morning compared to this weekend. Reports still having constant lightheadedness.     Weight 113 lbs       Consult background from 04/14  Ms. Felix is a 62 y.o. female with a past medical history of Pulmonary HTN, HTN, Raynaud's Disease, Cutaneous Systemic Scleroderma (dx 1998), ILD, PE (3/2023). She is a patient of Dr. Nguyễn. She was previously followed by Dr. Jos Valdez at [facility]. She had stress test 8/06/2013 was negative for ischemia. Echocardiogram from 4/21/2022 showed LVEF: 55-60%, mild aortic valve thickening, pulmonary artery systolic pressure: 52 mmHg and trivial pericardial effusion. LHC and RHC (separate occasions) over the last 5-10 years which showed normal hemodynamics and normal coronaries. She had a RHC 9/02/2020 showing top-normal right sided filling pressures with mild pulmonary hypertension, top-normal PVR and normal pulmonary capillary wedge pressure. The cardiac index was normal, seen below.     She has previously followed up with Dr. Judd Flesch at [facility] Pulmonology. PFTs 3/18/2020. Last OV 3/23/22. CT chest and echo ordered. She reports that she will be initiating care with Dr. Pratibha Abbott. She was to follow up with Dr. Giraldo of Rheumatology.     On 6/27/2022, she was in [facility] [place] chest pain. EKG: NSR without ischemic changes. hsTrop: 12->16, d-dimer: 0.56, CXR showed cardiomegaly and diffuse interstitial prominence. She was discharged home and advised to follow up with cardiology.     Dr. Nguyễn ordered echocardiogram, which was completed on 8/22/2022 and showed estimated RVSP = 50-55 mmHg, normal-sized LV with normal LV systolic function. Estimated EF 55- 60%. Wall motion grossly normal, mild concentric left ventricular hypertrophy, grade I LV diastolic dysfunction, probably normal RV size and function.    At her initial visit on  "10/11/2022, she reported that she felt very short of breath with minimal activity most of the time. She reported that this started about 1 year prior and had progressively gotten worse. She described PND and bendopnea. She reported that she experienced ankle swelling, worsened over the prior year and usually worse towards the end of the night. She also reported some swelling in her abdomen. She reported daily palpations. At her initial visit I recommended a RHC which was done 11/28/2022 and showed: Normal right sided filling pressures. Mildly elevated left sided filling pressures. Precapillary pulmonary hypertension with mean PA 36 mmHg. PVR: 4.3 Wood units. She was initiated on Sildenafil 20 mg TID.     At her office visit earlier in April, she reported that she was hospitalized on 3/19/2023 at [facility] for PE diagnosed on V/Q scan. She reported that she had presented with left arm pain and heaviness. She stated that she was started on Apixaban. She described on-going fatigue, shortness of breath and chest pain, which were about the same as her baseline prior to her hospitalization. She noted that she coughed up blood with clots every day. She stated that this has been going on for about a year, but she had worsened episodes since starting on Apixaban. She reports on-going Vertigo, which improved with Meclizine. She denied light-headedness, syncope or near syncope. She reportd that she fell and hit her head about 4-5 months ago (before she was on blood thinner). She reported a constant pressure at her left sternum. She reports that was a pressure (\"not burning or sharp\"). She denied any radiation or change with exertion. She could not identify any aggravating or relieving factors, but stated that the sensation was constant. She reported intermittent shortness of breath with rest. She reported needing to take a break after 4-5 steps of exertion. She described occasional swelling of her feet but not " "ankles or legs. She denied abdominal distension. She reported sleeping on 2-3 pillows at night. She reported that she could wake up short of breath with cough at times.     She now presents back with worsened chest pains and worsened nose bleeds / coughing up blood. She reports that both are worsened from her recent office visit. She denies changes in breathing. She denies light-headedness.              ---  Rheumatology: Enzo ([facility])  Pulmonology: Flesch -> Pratibha Abbott ([facility] - upcoming appointment)     Past Medical / Surgical History:  Pulmonary HTN, HTN, Raynaud's Disease, Cutaneous Systemic Scleroderma (dx 1998), ILD, PE (3/2023), Follicular Carcinoma of Thyroid, Tenosynovitis, de Quervain, h/o Hernia Repair, h/o Appendicitis, h/o Digit Amputation, H/o Total Thyroidectomy (Dr. Pacheco at [facility]; 4/2013)    Family & Social History: She is , she has two children. She works as an .     Tobacco: former 2005  EtOH: never   Illicits: never     Her brother has CAD with stent  1st cousin with SLE  Both parents had \"heart problems\"    Allergies:   Allergies   Allergen Reactions    Aspirin Shortness of breath     Other reaction(s): Unknown  \"stop breathing\"      Ibuprofen Shortness of breath     Stop breathing    Iodine Shortness of breath     Other reaction(s): Unknown    Iodine And Iodide Containing Products Shortness of breath     ? rash    Penicillins Shortness of breath     Other reaction(s): Unknown    Sulfa (Sulfonamide Antibiotics) Angioedema    Clindamycin     Hydrochlorothiazide      Other reaction(s): Abdominal Pain   Slow heart rate    Oxycodone      Other reaction(s): Vomiting    Sulfamethoxazole-Trimethoprim      Other reaction(s): Vomiting, Weakness     Latex Rash       Medications:   Current Facility-Administered Medications   Medication Dose Route Frequency Provider Last Rate Last Admin    acetaminophen (TYLENOL) tablet 650 mg  650 mg oral q6h PRN Gonzalo Wise MD   " 650 mg at 04/17/23 0820    amLODIPine (NORVASC) tablet 10 mg  10 mg oral Daily Rayray Martinez MD   10 mg at 04/17/23 0820    cyanocobalamin (VITAMIN B12) tablet 1,000 mcg  1,000 mcg oral Daily Rayray Martinez MD   1,000 mcg at 04/17/23 0820    glucose chewable tablet 16-32 g of dextrose  16-32 g of dextrose oral PRN Rayray Martinez MD        Or    dextrose 40 % oral gel 15-30 g of dextrose  15-30 g of dextrose oral PRN Rayray Martinez MD        Or    glucagon (GLUCAGEN) injection 1 mg  1 mg intramuscular PRN Rayray Martinez MD        Or    dextrose 50 % in water (D50) injection 12.5 g  25 mL intravenous PRN Rayray Martinez MD        diphenhydrAMINE (BENADRYL) capsule 25 mg  25 mg oral Once Charlene Almonte MD        enoxaparin (LOVENOX) syringe 40 mg  40 mg subcutaneous Daily (6p) Rayray Martinez MD   40 mg at 04/16/23 1701    [Provider Managed Hold] furosemide (LASIX) injection 20 mg  20 mg intravenous Daily Rayray Martinez MD   20 mg at 04/14/23 0837    hydrocortisone 2.5 % cream   Topical 3x daily PRN Charlene Almonte MD        levothyroxine (SYNTHROID) tablet 100 mcg  100 mcg oral Daily All Moore MD   100 mcg at 04/17/23 0628    lidocaine (ASPERCREME) 4 % topical patch 1 patch  1 patch Topical Daily PRN All Moore MD   1 patch at 04/15/23 0645    lidocaine HCL (XYLOCAINE) 2 % viscous mucosal solution 2 mL  2 mL Mouth/Throat 3x daily PRN La Nena Long MD   2 mL at 04/16/23 1513    meclizine (ANTIVERT) tablet 12.5 mg  12.5 mg oral 3x daily PRN Rayray Martinez MD   12.5 mg at 04/16/23 1256    melatonin ODT 3 mg  3 mg oral Nightly Charlene Almonte MD   3 mg at 04/16/23 2304    pantoprazole (PROTONIX) tablet,delayed release (DR/EC) 40 mg  40 mg oral Daily Rayray Martinez MD   40 mg at 04/14/23 0837    sildenafiL (pulm.hypertension) (REVATIO) tablet 20 mg  20 mg oral TID Rayray Martinez MD   20 mg at 04/17/23 0820       Review of Systems:   All  other systems reviewed and are negative except as per HPI.    Physical Exam:     Wt Readings from Last 10 Encounters:   04/17/23 51.7 kg (113 lb 14.4 oz)   04/06/23 52.6 kg (116 lb)   01/18/23 54.4 kg (120 lb)   11/28/22 55.3 kg (122 lb)   10/07/22 56.2 kg (124 lb)   08/22/22 57.2 kg (126 lb)   08/12/22 57.2 kg (126 lb)   06/27/22 57.6 kg (127 lb)   06/18/22 56.7 kg (125 lb)   11/27/20 63.5 kg (140 lb)       BP Readings from Last 5 Encounters:   04/17/23 131/61   04/06/23 130/74   01/18/23 140/70   11/28/22 (!) 149/70   10/07/22 140/81       Vitals:    04/17/23 0710   BP:    Pulse: 66   Resp:    Temp:    SpO2:        Body mass index is 18.38 kg/m².  Body surface area is 1.55 meters squared.    Constitutional: NAD, AAOx3  HENT: Normocephalic, atraumatic head, sclerae anicteric, no cervical lymphadenopathy, trachea midline  Cardiovascular: RRR, no murmurs, rubs or gallops, JVP: 11 mmHg   cm H2O, no carotid bruits.  Respiratory: CTA bilateral lung fields, no wheezes, rales or rhonchi  GI: soft, non-tender/non-distended  Musculoskeletal / Extremities: trace peripheral edema, +2 pulses bilateral radial, DP/PT arteries, skin tightening on face and fingertips, marked tenderness to palpation at left lower costochondral junction  Skin: no rashes  Neuro / Psych: no focal deficits, CNII-XII grossly intact, appropriate, cooperative    Diagnostic Data:     Results from last 7 days   Lab Units 04/17/23  0539 04/16/23  1201 04/15/23  2155 04/14/23  0544 04/13/23  1949   SODIUM mEQ/L 140 138 139   < > 140   POTASSIUM mEQ/L 4.5 4.1 4.0   < > 4.6   CHLORIDE mEQ/L 107 105 105   < > 107   CO2 mEQ/L 25 25 26   < > 25   BUN mg/dL 25* 27* 29*   < > 15   CREATININE mg/dL 1.0 0.9 1.0   < > 1.0   CALCIUM mg/dL 9.1 9.1 9.1   < > 9.4   ALBUMIN g/dL  --   --   --   --  3.0*   BILIRUBIN TOTAL mg/dL  --   --   --   --  0.2*   ALK PHOS IU/L  --   --   --   --  78   ALT IU/L  --   --   --   --  9*   AST IU/L  --   --   --   --  16   GLUCOSE mg/dL  78 84 85   < > 96   MAGNESIUM mg/dL 2.0 1.9 2.0   < >  --     < > = values in this interval not displayed.     Results from last 7 days   Lab Units 04/17/23  0539 04/16/23  0427 04/15/23  0834   WBC K/uL 8.42 9.39 10.12   HEMOGLOBIN g/dL 9.7* 10.0* 10.6*   HEMATOCRIT % 32.5* 33.4* 34.4*   MCV fL 90.3 89.8 88.7   PLATELETS K/uL 264 262 230     Component      Latest Ref Rng 4/14/2023   Triglycerides      30 - 149 mg/dL 44    Cholesterol      <=200 mg/dL 194    HDL      >=55 mg/dL 49 (L)    LDL Calculated      <=100 mg/dL 136 (H)    Non-HDL, Calculated      mg/dL 145       Component      Latest Ref Rng 4/13/2023 4/14/2023   Iron      35 - 150 ug/dL  29 (L)    TIBC      270 - 460 ug/dL  245 (L)    UIBC      180 - 360 ug/dL  216    Iron Saturation      15 - 45 %  12 (L)    Hemoglobin A1C      <5.7 % 4.4     Estimated Ave Glucose      mg/dL 80     High Sens Troponin I      <15.0 pg/mL 23.0 (H)     High Sens Troponin I       16.0 (H)     TSH      0.34 - 5.60 mIU/L 4.71     Ferritin      11 - 250 ng/mL  75    Vitamin B-12      180 - 914 pg/mL  362    Folate      >=5.8 ng/mL  15.4      Component      Latest Ref Rng 6/27/2022 4/13/2023   BNP      <=100 pg/mL 111 (H)  105 (H)      Component      Latest Ref Rng & Units 6/27/2022 6/27/2022           3:57 PM  5:53 PM   High Sens Troponin I      <15 pg/mL 12 16 (H)      Component      Latest Ref Rng & Units 6/27/2022   BNP      <=100 pg/mL 111 (H)      Component      Latest Ref Rng & Units 6/27/2022   WBC      3.80 - 10.50 K/uL 7.99   RBC      3.93 - 5.22 M/uL 4.05   Hemoglobin      11.8 - 15.7 g/dL 11.2 (L)   Hematocrit      35.0 - 45.0 % 35.8   MCV      83.0 - 98.0 fL 88.4   MCH      28.0 - 33.2 pg 27.7 (L)   MCHC      32.2 - 35.5 g/dL 31.3 (L)   RDW      11.7 - 14.4 % 15.6 (H)   Platelets      150 - 369 K/uL 243                                        ECG (4/13/2023, personally reviewed):       TTE (4/14/2023, personally reviewed):    The left ventricular cavity size is normal  with mild left ventricular hypertrophy and preserved systolic function. Estimated EF 65%. No regional wall motion abnormalities. Grade I diastolic dysfunction.      The aortic valve is tricuspid. Aortic valve sclerosis. Trace aortic regurgitation.       The visualized portions of the aortic root and ascending aorta are normal in size.      The mitral valve is mildly thickened. Mild mitral annular calcification. Trace mitral regurgitation.        The left atrium is severely dilated.       The right ventricle is normal in size with preserved systolic function      The pulmonic valve is not well seen.      The tricuspid valve is normal in appearance. mild tricuspid regurgitation with an estimated RVSP of 34 mmHg.        Right atrium is normal in size.      The IVC is small and collapses > 50% with inspiration consistent with normal right atrial pressures.      Small pericardial effusion, mostly posterior.        Compared to previous echocardiogram from 8/22/2022, there is no significant change       U/S Venous LE B/L (4/14/2023):   No evidence of DVT in either lower extremity.     CTA Chest (4/13/2023, personally reviewed):   IMPRESSION:  1. No CT evidence of pulmonary embolism.  2. Moderate cardiomegaly.  3. A small to moderate pericardial effusion is new from 2020.  4. Centrilobular emphysematous changes and bronchiectatic changes are present  throughout the lungs.  5. There are patchy parenchymal infiltrates within the dependent portion of  bilateral lower lobes which spares the periphery. Findings raise suspicion for  superimposed pneumonia or inflammatory process. There is additional patchy  parenchymal infiltrate within the right upper lobe.  6. A 9 mm groundglass opacity is present within the right upper lobe best  visualized on axial image 48 of series 3. Follow-up as per Fleischner Society  guidelines is advised.  7. Right axillary and mediastinal adenopathy as described above is nonspecific  and  may be related to the patient's known sarcoidosis. Other causes of  lymphadenopathy such as malignancy is not excluded.  8. A new  8.2 mm solid nodule is present within the medial aspect of the left  lower lobe. There are additional small nodules throughout both lungs. The  possibility of malignancy or metastatic disease is not excluded. If the patient  has previous chest CTs from outside institutions, direct comparison would be  useful to assess for stability versus interval change.     TTE (11/28/2022, personally reviewed):    Normal right- and mildly elevated left-sided filling pressures (RA: 3 mmHg, PCWP: 13 mmHg)   Elevated pulmonary artery pressures (60/22(35 mmHg)) in the setting of PCWP: 13 mmHg.    Normal cardiac output and index (CO: 5.1 L/min, CI: 3.2 L/min/m2)   PVR: 4.3 Wood units. SVR: 1840 dynes/sec/cm5      TTE (8/22/2022, personally reviewed):  · Normal-sized LV. Normal LV systolic function. Estimated EF 55- 60%. Wall motion appears grossly normal. Mild concentric left ventricular hypertrophy. Grade I LV diastolic dysfunction.  · Pulmonic valve not well visualized. Grossly normal pulmonic valve structure. Trace pulmonic valve regurgitation. No pulmonic valve stenosis.  · Aortic valve not well visualized. Aortic valve not well seen but likely trileaflet. Focal aortic valve calcification present. Sclerotic aortic valve leaflets. Mild aortic valve regurgitation. No aortic valve stenosis.  · RV not well visualized. Normal-sized RV. Normal RV systolic function.  · Normal-sized RA.  · There is a small loculated pericardial effusion anterior to the right atrium. No cardiac tamponade.  · Sclerotic mitral valve. Mitral valve calcification of the anterior leaflet present.  · Trace mitral valve regurgitation.  · No significant mitral valve stenosis.  · Normal-sized IVC. IVC demonstrates normal respiratory collapse.  · Tricuspid valve structure is grossly normal. Mild to moderate tricuspid valve  regurgitation.  · Estimated RVSP = 50-55 mmHg.  · Mildly dilated LA.  · No previous echocardiogram available for comparison.     TTE (4/21/2022, outside study):  This result has an attachment that is not available.     A complete transthoracic echocardiogram (including 2D, color flow   Doppler, spectral Doppler and M-mode imaging) was performed using the   standard protocol. The study quality was adequate.     The left ventricle is normal in size. There is moderately increased   wall thickness consistent with moderate concentric hypertrophy.   Endocardium is incompletely visualized, but no regional wall motion   abnormalities are noted in the visualized segments. Normal left   ventricular ejection fraction. The left ventricular ejection fraction by   visual estimate is 55% to 60%.     The right ventricle is normal in size. There is normal function of the   right ventricle.     The left atrium is normal in size. Left atrial volume is 58 mL.     The right atrial volume measures 52 mL. The right atrial volume index   measures 34 mL/m2.     There is trace mitral regurgitation. There is no mitral stenosis.     The aortic valve is trileaflet. There is mild aortic valve thickening.   There is no aortic stenosis. There is trace aortic regurgitation.     There is mild to moderate tricuspid regurgitation. Pulmonary artery   systolic pressure measures 52 mmHg. The pulmonary artery systolic pressure   is moderately elevated.     The aorta measures 3.2 cm at the sinus of Valsalva. The proximal   segment of the ascending aorta is mildly enlarged. The proximal segment of   the ascending aorta measures 3.4 cm. The proximal segment of the ascending   aorta measures 2.2 cm/m2, indexed for body surface area.     Trivial pericardial effusion present. There is no echocardiographic   evidence of cardiac tamponade.     The inferior vena cava is normal in size (diameter <21 mm) and   decreases >50% in size with inspiration,  suggesting a normal right atrial   pressure of 3 mmHg (range 0-5 mm Hg).     Compared to the prior study of 3/13/2020, there are no significant   changes.        PFT (3/23/2022):      PFT (7/14/2021):      CT Chest (5/2/2021, outside study):  CLINICAL INFORMATION: Systemic sclerosis. Interstitial lung disease. Groundglass nodule     PROCEDURE: Unenhanced CT of the chest was performed using thin section reconstructions. Images were obtained on inspiration and expiration.     COMPARISON: June and August 2020     FINDINGS:     LUNGS, PLEURA:     Groundglass opacities with reticulation with subpleural sparing in the lower lobes, without honeycombing or architectural distortion, unchanged.     Right upper lobe groundglass nodule, image 114 of series 3, 8 x 11 mm, previously 6 mm.     Expiratory images are of diagnostic quality and do not demonstrate evidence of clinically significant air trapping.     CARDIOVASCULAR, MEDIASTINUM, THYROID: Coronary calcifications. Trace pericardial effusion.     LYMPH NODES: Subcentimeter mediastinal lymph nodes, unchanged. Unchanged axillary lymph nodes.     SKELETON, CHEST WALL: No destructive bone lesion     UPPER ABDOMEN: Patulous esophagus. 3 mm right renal stone.       RHC (9/02/2020):  RA   8 mmHg   RV   40/5 mmHg   PA (mean)  44/16 (25) mmHg   PCWP   12 mmHg   CO   4.65 lpm (Thermodilution)   CI   2.65 lpm/m-squared   SVI    33 ml/beat/m-squared (lower limit normal 29)   PVR   2.8 mmHg/l/min (Wood units)   SVR   2064 dyne-sec-cm-5         PFT (3/18/2020):      TTE (3/13/2020, outside study):  · The study quality was good.   · The left ventricle is normal in size. Top normal left ventricular wall   thickness. There are no segmental wall motion abnormalities. The left   ventricular ejection fraction is 55-60% by visual estimate and 3D   echocardiography. Normal left ventricular ejection fraction.   · There is grade I (mild) LV diastolic dysfunction.   · The right ventricle is  normal in size. There is normal function of the   right ventricle.   · The right atrium is mildly dilated.   · There is mild mitral valve anterior leaflet thickening. There is mild   mitral valve leaflet calcification. There is flattening of the mitral   leaflets without raphael prolapse. There is trace mitral regurgitation.   · The aortic valve is trileaflet. There is mild thickening of the aortic   valve. There is mild calcification of the aortic valve. There is no aortic   /stenosis. There is trace aortic regurgitation.   · There is mild tricuspid valve leaflet thickening. There is mild to   moderate tricuspid regurgitation. The pulmonary artery systolic pressure   is moderately elevated. Pulmonary artery systolic pressure measures 50   mmHg. There is mid-systolic notching of the RVOT VTI consistent with   elevated pulmonary vascular resistance.   · The inferior vena cava is normal in size (diameter <21 mm) and decreases   >50% in size with inspiration, suggesting a normal right atrial pressure   of 3 mmHg (range 0-5 mm Hg).   · Trivial loculated pericardial effusion present adjacent to the right   ventricle and adjacent to the right atrium.          Assessment / Plan:     1. Hemoptysis   - With negative CTA Chest and negative U/S B/L LE, it would be reasonable to stop therapeutic anticoagulation  - Possible bronchoscopy per Pulmonology    2. Atypical Chest Pain  - Not ACS  - This appears to be musculoskeletal by history and exquisite tenderness on palpation at left lower costochondral junction    3. Pulmonary Hypertension (WHO Group I, NYHA/WHO FC II/III):   -Standing weight increased since admission from 110 ->114 lbs, came down one lb with IV diuresis on Friday. mildly volume overloaded today, reports increased dyspnea today. Chest xray today showed mild vascular and interstitial prominence. Would give one dose of Furosemide 20 mg IV today.   - Occurring in the background of Systemic Scleroderma with ILD. August  2022 TTE showed normal RV size and function, but progressive exertional decline, worsened DLCO and resting tachycardia suggest there may be progression of her pulmonary vascular disease and limitation of cardiac output. This was proven with 11/2022 RHC showing mean PA 35 mmHg (with PCWP 13 mmHg) and PVR 4.3 Wood units.   - Continue Sildenafil 20 mg TID  - Macitentan prior approval in progress    4. HTN:   - Elevated.  - Continue Amlodipine.     5. Systemic Scleroderma / Raynaud's Disease:   - Per outpatient Rheumatology (Dr. Giraldo).      6. ILD:   - Per outpatient Pulmonology (Dr. Abbott - due to establish care) and Rheumatology (Dr. Giraldo).      RAS Marcos C

## 2023-04-17 NOTE — PROGRESS NOTES
"     Pulmonary  Progress Note       SUBJECTIVE     NAEON.    Afebrile. Saturating at 96% on RA with no respiratory distress. She told me she \"I do not feel good\" but cannot elaborate on why.     OBJECTIVE     Vital Signs:     Temp (24hrs), Av.6 °C (97.8 °F), Min:36.3 °C (97.4 °F), Max:36.8 °C (98.2 °F)      Visit Vitals  /61 (BP Location: Right upper arm, Patient Position: Lying)   Pulse 66   Temp 36.6 °C (97.9 °F) (Oral)   Resp 20   Ht 1.676 m (5' 6\")   Wt 51.7 kg (113 lb 14.4 oz)   LMP  (LMP Unknown)   SpO2 96%   BMI 18.38 kg/m²     Vitals:    23 0300 23 0301 23 0635 23 0710   BP: 131/61      BP Location: Right upper arm      Patient Position: Lying      Pulse: 82 78  66   Resp: 20      Temp: 36.6 °C (97.9 °F)      TempSrc: Oral      SpO2: 96%      Weight:   51.7 kg (113 lb 14.4 oz)    Height:             I/O's:    Intake/Output Summary (Last 24 hours) at 2023 0944  Last data filed at 2023 0900  Gross per 24 hour   Intake 720 ml   Output 300 ml   Net 420 ml         Medications:     amLODIPine  10 mg oral Daily    cyanocobalamin  1,000 mcg oral Daily    diphenhydrAMINE  25 mg oral Once    enoxaparin  40 mg subcutaneous Daily (6p)    [Provider Managed Hold] furosemide  20 mg intravenous Daily    levothyroxine  100 mcg oral Daily    melatonin  3 mg oral Nightly    pantoprazole  40 mg oral Daily    sildenafiL (pulm.hypertension)  20 mg oral TID              Physical Exam     -Constitutional: alert, elderly female, chronically ill appearing, non-diaphoretic, no apparent distress  -HENT: Normocephalic, atraumatic without tenderness. Vitiligo on her face. Significant skin tightening, around mouth as well   -Eyes: conjunctiva clear, anicteric sclera   -Neck: supple, trachea midline, no JVP  -Cardiovascular: normal rate, regular rhythm, no murmurs; no rubs/gallops  -Pulmonary/Chest: very poor inspiratory effort, no obvious crackles and/or wheezing  -Abdomen: soft, " non-distended, non-tender to palpation, +BS; no rebound or guarding   -Extremities: multiple shortened digits, skin tightening, no lower extremity edema  -Skin: dry and warm   -Neurological: awake and alert     Diagnostic Data     Labs:  I have personally reviewed all pertinent patient laboratory results. Labs of note discussed below:    ABG Results       04/13/23 1949    Source Of Oxygen nc        CMP Results       04/17/23 04/16/23 04/15/23     0539 1201 2155     138 139    K 4.5 4.1 4.0    Cl 107 105 105    CO2 25 25 26    Glucose 78 84 85    BUN 25 27 29    Creatinine 1.0 0.9 1.0    Calcium 9.1 9.1 9.1    Anion Gap 8 8 8    EGFR >60.0 >60.0 >60.0        CBC Results       04/17/23 04/16/23 04/15/23     0539 0427 0834    WBC 8.42 9.39 10.12    RBC 3.60 3.72 3.88    HGB 9.7 10.0 10.6    HCT 32.5 33.4 34.4    MCV 90.3 89.8 88.7    MCH 26.9 26.9 27.3    MCHC 29.8 29.9 30.8     262 230        Microbiology Results     Procedure Component Value Units Date/Time    SARS-CoV-2 (COVID-19), PCR Nasopharynx [[id number]]  (Normal) Collected: 04/13/23 2142    Specimen: Nasopharyngeal Swab from Nasopharynx Updated: 04/13/23 2304    Narrative:      The following orders were created for panel order SARS-CoV-2 (COVID-19), PCR Nasopharynx.  Procedure                               Abnormality         Status                     ---------                               -----------         ------                     SARS-COV-2 (COVID-19)/ F...[[id number]]  Normal              Final result                 Please view results for these tests on the individual orders.    SARS-COV-2 (COVID-19)/ FLU A/B, AND RSV, PCR Nasopharynx [[id number]]  (Normal) Collected: 04/13/23 2142    Specimen: Nasopharyngeal Swab from Nasopharynx Updated: 04/13/23 2304     SARS-CoV-2 (COVID-19) Negative     Influenza A Negative     Influenza B Negative     Respiratory Syncytial Virus Negative    Narrative:      Testing performed using real-time PCR  for detection of COVID-19. EUA approved validation studies performed on site.             Imaging:    I have reviewed all pertinent imaging which is discussed below.     ASSESSMENT AND PLAN     Arielle Felix is a 62 y.o. female with a past medical history of Follicular carcinoma of the thyroid s/p thyroidectomy, PE (3/2023, diagnosed by VQ scan w/ right apical wedge shaped perfusion defect), cutaneous systemic scleroderma, pulmonary hypertension (on sildenafil, Group 1 vs 3), HFpEF, ILD and protein calorie malnutrition (BMI 17.79) who presented on 4/13/2023 with chest pressure, hemoptysis and shortness of breath.     Impression:  1. Scleroderma associated ILD  - NSIP pattern on CT, not on chronic immunosuppression.   - w/ associated moderately severe restrictive lung disease on last PFts - Spirometry in 2021 showed FEV1 of 1.63 and 20% decreased DLCO  - She had been recommended to start MMF but she had concerns about this and was never started on treatment.  - CTA this admission with no large PE, diffuse bilateral hazy ground glass throughout  but mostly lower lobe predominant the lung which is more dense in the bilateral lower lobes. NSIP pattern with central consolidation and subpleural sparing. No tracheal debris or bronchial plugging apparent on imaging. She does have centrilobular emphysema findings as well. Venous dopplers (3/104233)  - Bilateral LE without DVTs    2. Hemoptysis  - chronic but more increased in severity with initiation of AC after PE in 03/2023 based on V/Q Scan. Likely 2/2 bronchiectasis.     3. Recent PE  - 3/20/2023 VQ scan: Right apical wedge shaped perfusion defect. Additionally, there is heterogenous perfusion in the right mid and lower lungs, likely a combination of smaller emboli and matched  defects due to known lung disease.  - without evidence of right heart strain, negative bilateral LE dopplers  - Repeat TTE without evidence of RHS - no significant change from  "8/22/22    4. Pulmonary Hypertension  - in setting of Ssc-ILD, non group 2. On Sildenafil  - RHC 11/28/2022 - RA 3, PCWP 13, PA 60/22/35, CI 3.2, PVR 4.3    5. History of Tobacco use  - Ex-smoker, quit in 2005, approximately 5 ppday    Recommendations:  - Cardiology consulted, appreciate recommendations  - Continue sildenafil 20 mg TID, pending approval for Macitentan   - Would continue with DVT prophylaxis for now, however would hold off on full dose of AC given her hemoptysis (fortunatly no further hemoptysis while off AC)  - CTA here with no evidence of PE (despite VQ scan in March stating \"right apical wedge shapred perfusion defect\" - favor not treating with full dose AC given hemoptysis  - Agree with monitoring off abx presently  - Patient with progressive exertional decline and worsening DLCO - she will need discussion about starting immune suppressive medications at this point with her pulmonologist, as well as possible anti-fibrotic agent if there is evidence of progressive fibrotic disease   - Rest per primary team    Patient interesting in establishing care in our office, but feels [place] is too far. She will think about it. Number provided to patient - [phone]         Haley Knight MD  Pulmonary & Critical Care Medicine Fellow  PGY-4  Pager: [phone]  4/17/2023       "

## 2023-04-17 NOTE — PROGRESS NOTES
Patient: Arielle Felix  Location: [place] [address]  MRN:  [id number]  Today's date:  4/17/2023    Attempted to see patient for therapy. Unable due to patient at test or procedure (Patient at Ultrasound. PT will continue to follow as appropriate.).

## 2023-04-17 NOTE — PLAN OF CARE
Care Coordination Discharge Plan Summary   Date: 4/17/2023   Time: 1:50 PM    Patient Name: Arielle Felix  Medical Record Number: [id number]  YOB: 1960  Room/BED: 0348D    General Information  Patient-Specific Goals (Include Timeframe)     PCP: Sara Simmons MD   Insurance Coverage: [employer]  Readmission Within the last 30 days       Advance Directives (for Healthcare)?  Does patient have advance directive?: No  Patient does not have Advance Directive: Patient/Family declines further information  Does patient have current OOH DNR form?: No  Patient does not have current OOH DNR form: Patient/Family declines further information  Does patient have current POLST?: No  Patient does not have current POLST: Patient/Family declines further information  Does patient have mental health advance directive?: No  Patient does not have Mental Health Advance Directive: Patient/Family declines further information      Living Arrangements  Arrived From: home  Current Living Arrangements: home  People in Home: alone  Home Accessibility: stairs to enter home (Group), stairs within home (Group)  Living Arrangement Comments: pt lives alone in 2 story home, 4 LARRY, stairglide inside, bed/bathroom upstairs.  Able to Return to Prior Arrangements: yes    Housing Stability and Financial Resources (SDOH)  In the last 12 months, was there a time when you were not able to pay the mortgage or rent on time?: No  In the last 12 months, was there a time when you did not have a steady place to sleep or slept in a shelter (including now)?: No      Type of Current Home Care Services  home health aide    Assistive Device/Animal Currently Used at Home  none      Concerns to be Addressed  discharge planning, care coordination/care conferences    Current Discharge Risk  chronically ill    Anticipated Changes Related to Illness  none    Transportation Concerns (SDOH)  Transportation Anticipated: family or friend will provide  In the  past 12 months, has lack of transportation kept you from medical appointments or from getting medications?: No  In the past 12 months, has lack of transportation kept you from meetings, work, or from getting things needed for daily living?: No    Concerns Comments  Per DPR, ENRIQUE today vs tomorrow, pending repeat x-ray. Family will  at discharge. PT/OT rec'd home with . Patient currently open with [facility], referraal placed. CC will continue to follow for transition of care needs until discharge is complete.          Anticipated Discharge Plan   Anticipated Discharge Disposition: home with home health  Type of Home Care Services: home health aide, nursing, home PT, home OT  Patient/Family Anticipates Transition to: home with help/services  Patient/Family Anticipated Services at Transition: home health care  Met with patient. Provided education and contact information for Care Coordination services.: yes  Screening complete: yes    Discharge Assessment  Current Discharge Risk: chronically ill  Concerns to be Addressed: discharge planning, care coordination/care conferences  Anticipated Changes Related to Illness: none    Patient Choice  Offered/Gave Vendor List: yes  Patient's Choice of Community Agency(s): [facility]    Patient and/or patient guardian/advocate was made aware of their right to choose a provider. A list of eligible providers was presented and reviewed with the patient and/or patient guardian/advocate in written and/or verbal form. The list delineates providers in the patients desired geographic area who are participating in the Medicare program and/or providers contracted with the patients primary insurance. The Medicare list and quality ratings were obtained from the Medicare.gov [medicare.gov] website.    Discharge Transportation   Does the patient need discharge transport arranged?: No (family)        Discharge Barriers   Barriers to Discharge: Test pending    Continued Care and Services -  Admitted Since 4/13/2023    Coordination has not been started for this encounter.

## 2023-04-17 NOTE — PLAN OF CARE
Problem: Adult Inpatient Plan of Care  Goal: Plan of Care Review  Outcome: Progressing  Flowsheets (Taken 4/17/2023 0358)  Progress: improving  Plan of Care Reviewed With: patient  Outcome Summary:   VSS. NSR on monitor. Room air. Complaints of generalized pain, refused PRN medication   K-pad under B/L LE. AAOx4. Assistx1 OOB, but does endorse feeling dizzy when up. PRN medication for vertigo administered w/ some relief. Weak DP pulses. No edema, okay UO. Calm and pleasant. Bed alarm set. Callbell w/in reach.  Goal: Patient-Specific Goal (Individualized)  Outcome: Progressing  Goal: Absence of Hospital-Acquired Illness or Injury  Outcome: Progressing  Goal: Optimal Comfort and Wellbeing  Outcome: Progressing  Goal: Readiness for Transition of Care  Outcome: Progressing     Problem: Fall Injury Risk  Goal: Absence of Fall and Fall-Related Injury  Outcome: Progressing     Problem: Skin Injury Risk Increased  Goal: Skin Health and Integrity  Outcome: Progressing     Problem: Mobility Impairment  Goal: Optimal Mobility  Outcome: Progressing     Problem: Heart Failure Comorbidity  Goal: Maintenance of Heart Failure Symptom Control  Outcome: Progressing     Problem: Pain Chronic (Persistent) (Comorbidity Management)  Goal: Acceptable Pain Control and Functional Ability  Outcome: Progressing     Problem: Self-Care Deficit  Goal: Improved Ability to Complete Activities of Daily Living  Outcome: Progressing

## 2023-04-18 LAB
ANION GAP SERPL CALC-SCNC: 8 MEQ/L (ref 3–15)
BUN SERPL-MCNC: 26 MG/DL (ref 8–20)
CALCIUM SERPL-MCNC: 9.1 MG/DL (ref 8.9–10.3)
CHLORIDE SERPL-SCNC: 108 MEQ/L (ref 98–109)
CO2 SERPL-SCNC: 23 MEQ/L (ref 22–32)
CREAT SERPL-MCNC: 1.1 MG/DL (ref 0.6–1.1)
ERYTHROCYTE [DISTWIDTH] IN BLOOD BY AUTOMATED COUNT: 16.3 % (ref 11.7–14.4)
GFR SERPL CREATININE-BSD FRML MDRD: >60 ML/MIN/1.73M*2
GLUCOSE SERPL-MCNC: 82 MG/DL (ref 70–99)
HCT VFR BLDCO AUTO: 33 % (ref 35–45)
HGB BLD-MCNC: 10.1 G/DL (ref 11.8–15.7)
MAGNESIUM SERPL-MCNC: 2 MG/DL (ref 1.8–2.5)
MCH RBC QN AUTO: 27.7 PG (ref 28–33.2)
MCHC RBC AUTO-ENTMCNC: 30.6 G/DL (ref 32.2–35.5)
MCV RBC AUTO: 90.4 FL (ref 83–98)
PDW BLD AUTO: 11.8 FL (ref 9.4–12.3)
PLATELET # BLD AUTO: 256 K/UL (ref 150–369)
POTASSIUM SERPL-SCNC: 4.4 MEQ/L (ref 3.6–5.1)
RBC # BLD AUTO: 3.65 M/UL (ref 3.93–5.22)
SODIUM SERPL-SCNC: 139 MEQ/L (ref 136–144)
WBC # BLD AUTO: 6.95 K/UL (ref 3.8–10.5)

## 2023-04-18 PROCEDURE — 36415 COLL VENOUS BLD VENIPUNCTURE: CPT | Performed by: STUDENT IN AN ORGANIZED HEALTH CARE EDUCATION/TRAINING PROGRAM

## 2023-04-18 PROCEDURE — 63700000 HC SELF-ADMINISTRABLE DRUG

## 2023-04-18 PROCEDURE — 63700000 HC SELF-ADMINISTRABLE DRUG: Performed by: STUDENT IN AN ORGANIZED HEALTH CARE EDUCATION/TRAINING PROGRAM

## 2023-04-18 PROCEDURE — 63600000 HC DRUGS/DETAIL CODE: Mod: JZ | Performed by: STUDENT IN AN ORGANIZED HEALTH CARE EDUCATION/TRAINING PROGRAM

## 2023-04-18 PROCEDURE — 21400000 HC ROOM AND CARE CCU/INTERMEDIATE

## 2023-04-18 PROCEDURE — 83735 ASSAY OF MAGNESIUM: CPT

## 2023-04-18 PROCEDURE — G0378 HOSPITAL OBSERVATION PER HR: HCPCS

## 2023-04-18 PROCEDURE — 80048 BASIC METABOLIC PNL TOTAL CA: CPT

## 2023-04-18 PROCEDURE — 99233 SBSQ HOSP IP/OBS HIGH 50: CPT | Performed by: HOSPITALIST

## 2023-04-18 PROCEDURE — 85027 COMPLETE CBC AUTOMATED: CPT | Performed by: STUDENT IN AN ORGANIZED HEALTH CARE EDUCATION/TRAINING PROGRAM

## 2023-04-18 RX ORDER — DICLOFENAC SODIUM 10 MG/G
GEL TOPICAL 4 TIMES DAILY
Status: DISCONTINUED | OUTPATIENT
Start: 2023-04-18 | End: 2023-04-18

## 2023-04-18 RX ADMIN — ACETAMINOPHEN 650 MG: 325 TABLET ORAL at 00:31

## 2023-04-18 RX ADMIN — LEVOTHYROXINE SODIUM 100 MCG: 0.1 TABLET ORAL at 06:52

## 2023-04-18 RX ADMIN — LIDOCAINE 1 PATCH: 4 PATCH TOPICAL at 09:37

## 2023-04-18 RX ADMIN — Medication 3 MG: at 21:47

## 2023-04-18 RX ADMIN — ENOXAPARIN SODIUM 40 MG: 40 INJECTION SUBCUTANEOUS at 18:16

## 2023-04-18 RX ADMIN — AMLODIPINE BESYLATE 10 MG: 10 TABLET ORAL at 09:37

## 2023-04-18 RX ADMIN — ACETAMINOPHEN 650 MG: 325 TABLET ORAL at 09:36

## 2023-04-18 RX ADMIN — SILDENAFIL 20 MG: 20 TABLET, FILM COATED ORAL at 21:47

## 2023-04-18 RX ADMIN — ACETAMINOPHEN 650 MG: 325 TABLET ORAL at 21:48

## 2023-04-18 RX ADMIN — SILDENAFIL 20 MG: 20 TABLET, FILM COATED ORAL at 09:37

## 2023-04-18 RX ADMIN — SILDENAFIL 20 MG: 20 TABLET, FILM COATED ORAL at 15:00

## 2023-04-18 RX ADMIN — CYANOCOBALAMIN TAB 1000 MCG 1000 MCG: 1000 TAB at 09:36

## 2023-04-18 ASSESSMENT — COGNITIVE AND FUNCTIONAL STATUS - GENERAL
CLIMB 3 TO 5 STEPS WITH RAILING: 2 - A LOT
MOVING TO AND FROM BED TO CHAIR: 3 - A LITTLE
STANDING UP FROM CHAIR USING ARMS: 3 - A LITTLE
WALKING IN HOSPITAL ROOM: 3 - A LITTLE

## 2023-04-18 NOTE — PROGRESS NOTES
Pt said she was not feeling well today, so I asked if I could read to her briefly, and have a prayer. She was grateful for both.    SC will continue to follow.    Rosy Cardozo  Spiritual Care [phone]

## 2023-04-18 NOTE — PROGRESS NOTES
"     Pulmonary  Progress Note       SUBJECTIVE     NAEON. Patient eager to go home but feels her breathing has not improved.      OBJECTIVE     Vital Signs:     Temp (24hrs), Av.7 °C (98.1 °F), Min:36.6 °C (97.8 °F), Max:37 °C (98.6 °F)      Visit Vitals  BP (!) 125/58 (BP Location: Right upper arm, Patient Position: Lying)   Pulse 79   Temp 36.7 °C (98.1 °F) (Oral)   Resp 18   Ht 1.676 m (5' 6\")   Wt 52 kg (114 lb 9.6 oz)   LMP  (LMP Unknown)   SpO2 100%   BMI 18.50 kg/m²     Vitals:    23 2300 23 0300 23 0607 23 0700   BP: 131/63 121/63  (!) 125/58   BP Location: Right upper arm Right upper arm  Right upper arm   Patient Position: Lying Lying  Lying   Pulse: 99 77  79   Resp: 18 16  18   Temp: 37 °C (98.6 °F) 36.7 °C (98.1 °F)  36.7 °C (98.1 °F)   TempSrc: Oral Oral  Oral   SpO2: 98% 100%  100%   Weight:   52 kg (114 lb 9.6 oz)    Height:             I/O's:    Intake/Output Summary (Last 24 hours) at 2023 0924  Last data filed at 2023 0800  Gross per 24 hour   Intake 880 ml   Output 1400 ml   Net -520 ml         Medications:     amLODIPine  10 mg oral Daily    cyanocobalamin  1,000 mcg oral Daily    enoxaparin  40 mg subcutaneous Daily (6p)    [Provider Managed Hold] furosemide  20 mg intravenous Daily    levothyroxine  100 mcg oral Daily    melatonin  3 mg oral Nightly    ondansetron ODT  4 mg oral Once    pantoprazole  40 mg oral Daily    sildenafiL (pulm.hypertension)  20 mg oral TID              Physical Exam     -Constitutional: alert, elderly female, chronically ill appearing, non-diaphoretic, no apparent distress  -HENT: Normocephalic, atraumatic without tenderness. Vitiligo on her face. Significant skin tightening, around mouth as well   -Eyes: conjunctiva clear, anicteric sclera   -Neck: supple, trachea midline, no JVP  -Cardiovascular: normal rate, regular rhythm, no murmurs; no rubs/gallops  -Pulmonary/Chest: very poor inspiratory effort, no obvious " crackles and/or wheezing  -Abdomen: soft, non-distended, non-tender to palpation, +BS; no rebound or guarding   -Extremities: multiple shortened digits, skin tightening, no lower extremity edema  -Skin: dry and warm   -Neurological: awake and alert     Diagnostic Data     Labs:  I have personally reviewed all pertinent patient laboratory results. Labs of note discussed below:    ABG Results       04/13/23 1949    Source Of Oxygen nc        CMP Results       04/17/23 04/16/23 04/15/23     0539 1201 2155     138 139    K 4.5 4.1 4.0    Cl 107 105 105    CO2 25 25 26    Glucose 78 84 85    BUN 25 27 29    Creatinine 1.0 0.9 1.0    Calcium 9.1 9.1 9.1    Anion Gap 8 8 8    EGFR >60.0 >60.0 >60.0        CBC Results       04/17/23 04/16/23 04/15/23     0539 0427 0834    WBC 8.42 9.39 10.12    RBC 3.60 3.72 3.88    HGB 9.7 10.0 10.6    HCT 32.5 33.4 34.4    MCV 90.3 89.8 88.7    MCH 26.9 26.9 27.3    MCHC 29.8 29.9 30.8     262 230        Microbiology Results     Procedure Component Value Units Date/Time    SARS-CoV-2 (COVID-19), PCR Nasopharynx [[id number]]  (Normal) Collected: 04/13/23 2142    Specimen: Nasopharyngeal Swab from Nasopharynx Updated: 04/13/23 2304    Narrative:      The following orders were created for panel order SARS-CoV-2 (COVID-19), PCR Nasopharynx.  Procedure                               Abnormality         Status                     ---------                               -----------         ------                     SARS-COV-2 (COVID-19)/ F...[[id number]]  Normal              Final result                 Please view results for these tests on the individual orders.    SARS-COV-2 (COVID-19)/ FLU A/B, AND RSV, PCR Nasopharynx [[id number]]  (Normal) Collected: 04/13/23 2142    Specimen: Nasopharyngeal Swab from Nasopharynx Updated: 04/13/23 2304     SARS-CoV-2 (COVID-19) Negative     Influenza A Negative     Influenza B Negative     Respiratory Syncytial Virus Negative    Narrative:       Testing performed using real-time PCR for detection of COVID-19. EUA approved validation studies performed on site.             Imaging:    I have reviewed all pertinent imaging which is discussed below.    X-RAY CHEST 2 VIEWS    Result Date: 4/17/2023  CLINICAL HISTORY: Rule out pthx COMPARISON: Comparison is made to an exam earlier on the same day. COMMENT: TECHNIQUE: PA and lateral views of the chest were performed. LINES/TUBES/HARDWARE: Monitoring leads/wires overlie the patient. LUNGS AND PLEURA: See impression CARDIOMEDIASTINUM: Unchanged cardiomegaly and aortic atherosclerosis. SKELETON/OTHER: No acute osseous abnormality.     IMPRESSION: 1.  Linear interface in the right lung seen previously is no longer identified suggesting that it was likely a skinfold artifact. No definite pneumothorax otherwise seen. 2.  Cardiomegaly with mild vascular and interstitial prominence on a background of emphysema and underlying interstitial lung disease.    Ultrasound venous arm left    Result Date: 4/17/2023  CLINICAL HISTORY:  There is a reported history of pulmonary embolism with left upper extremity edema, evaluate for deep venous thrombosis. COMMENT: Ultrasound examination of the left upper extremity was performed.  There are no prior films available for comparison.  Grayscale, color and spectral Doppler scans demonstrate a normal grayscale appearance with color flow identified in the left internal jugular, subclavian, axillary, brachial, cephalic and basilic veins.  There are no findings to suggest acute deep venous thrombosis.     IMPRESSION: No evidence of left upper extremity deep venous thrombosis.    X-RAY CHEST 1 VIEW    Result Date: 4/17/2023  CLINICAL HISTORY: chest pain and SOB COMPARISON: 4/13/2023 COMMENT: TECHNIQUE: Single frontal view of the chest was performed. LINES/TUBES/HARDWARE: Monitoring leads/wires overlie the patient. LUNGS AND PLEURA: See impression CARDIOMEDIASTINUM: The cardiomediastinal  silhouette is stable SKELETON/OTHER: Degenerative changes of the spine and shoulders.     IMPRESSION: 1.  Cardiomegaly with mild vascular and interstitial prominence similar to prior, and the setting of known scleroderma associated ILD. 2.  Linear interface of the right apex may be related to a skinfold artifact but suggest follow-up to confirm absence of pneumothorax. Findings called to the patient's nurse at 11:14 am 4/17/23.      ASSESSMENT AND PLAN     Arielle Felix is a 62 y.o. female with a past medical history of Follicular carcinoma of the thyroid s/p thyroidectomy, PE (3/2023, diagnosed by VQ scan w/ right apical wedge shaped perfusion defect), cutaneous systemic scleroderma, pulmonary hypertension (on sildenafil, Group 1 vs 3), HFpEF, ILD and protein calorie malnutrition (BMI 17.79) who presented on 4/13/2023 with chest pressure, hemoptysis and shortness of breath.     Impression:  1. Scleroderma associated ILD  - NSIP pattern on CT, not on chronic immunosuppression.   - w/ associated moderately severe restrictive lung disease on last PFts - Spirometry in 2021 showed FEV1 of 1.63 and 20% decreased DLCO  - She had been recommended to start MMF but she had concerns about this and was never started on treatment.  - CTA this admission with no large PE, diffuse bilateral hazy ground glass throughout  but mostly lower lobe predominant the lung which is more dense in the bilateral lower lobes. NSIP pattern with central consolidation and subpleural sparing. No tracheal debris or bronchial plugging apparent on imaging. She does have centrilobular emphysema findings as well. Venous dopplers (3/930582)  - Bilateral LE without DVTs    2. Hemoptysis  - chronic but more increased in severity with initiation of AC after PE in 03/2023 based on V/Q Scan. Likely 2/2 bronchiectasis.     3. Recent PE  - 3/20/2023 VQ scan: Right apical wedge shaped perfusion defect. Additionally, there is heterogenous perfusion in the  "right mid and lower lungs, likely a combination of smaller emboli and matched  defects due to known lung disease.  - without evidence of right heart strain, negative bilateral LE dopplers  - Repeat TTE without evidence of RHS - no significant change from 8/22/22    4. Pulmonary Hypertension  - in setting of Ssc-ILD, non group 2. On Sildenafil  - RHC 11/28/2022 - RA 3, PCWP 13, PA 60/22/35, CI 3.2, PVR 4.3    5. History of Tobacco use  - Ex-smoker, quit in 2005, approximately 5 ppday    Recommendations:  - Cardiology consulted, appreciate recommendations  - Continue sildenafil 20 mg TID, pending approval for Macitentan   - Would continue with DVT prophylaxis for now, however would hold off on full dose of AC given her hemoptysis (fortunatly no further hemoptysis while off AC)  - CTA here with no evidence of PE (despite VQ scan in March stating \"right apical wedge shapred perfusion defect\" - favor not treating with full dose AC given hemoptysis  - Agree with monitoring off abx presently  - Patient with progressive exertional decline and worsening DLCO - she will need discussion about starting immune suppressive medications at this point with her pulmonologist, as well as possible anti-fibrotic agent if there is evidence of progressive fibrotic disease   - Rest per primary team  - Offered patient to f/u with our office but she refused, stating she has an appointment at [facility] with a new pulmonologist         Haley Knight MD  Pulmonary & Critical Care Medicine Fellow  PGY-4  Pager: [phone]  4/18/2023       "

## 2023-04-18 NOTE — PROGRESS NOTES
Internal Medicine  Daily Progress Note       SUBJECTIVE   This is a 62 y.o. year-old female admitted on 4/13/2023 with Cardiomegaly [I51.7]  Pulmonary fibrosis (CMS/HCC) [J84.10]  Pericardial effusion [I31.39].    Interval History: Seen and examined at bedside, no acute events overnight.  Continues to complain of intermittent dyspnea and left-sided chest pain.  Hemoptysis has resolved.  Otherwise ROS is negative.     OBJECTIVE   Vital signs in last 24 hours:  Temp:  [36.6 °C (97.8 °F)-37 °C (98.6 °F)] 36.7 °C (98.1 °F)  Heart Rate:  [] 79  Resp:  [16-18] 18  BP: (107-132)/(58-63) 125/58  SpO2:  [95 %-100 %] 100 %  Oxygen Therapy: None (Room air)    Weight & I/Os:  Weights (last 5 days)     Date/Time Weight    04/18/23 0607 52 kg (114 lb 9.6 oz)    04/17/23 0635 51.7 kg (113 lb 14.4 oz)    04/16/23 0800 51.8 kg (114 lb 2.1 oz)    04/15/23 0600 51.8 kg (114 lb 3.2 oz)    04/14/23 1523 50 kg (110 lb 3.7 oz)    04/14/23 0447 50 kg (110 lb 3.2 oz)    04/14/23 0300 50 kg (110 lb 3.2 oz)    04/13/23 18:04:29 51.3 kg (113 lb)          Intake/Output Summary (Last 24 hours) at 4/18/2023 0659  Last data filed at 4/18/2023 0600  24 Hour Net Input/Output from 7AM Yesterday   Intake 770 ml   Output 1500 ml   Net -730 ml        PHYSICAL EXAMINATION   Physical Exam  Vitals reviewed.   Constitutional:       Appearance: Normal appearance. She is ill-appearing.   HENT:      Head: Normocephalic and atraumatic.   Cardiovascular:      Rate and Rhythm: Normal rate and regular rhythm.      Pulses: Normal pulses.   Pulmonary:      Effort: Pulmonary effort is normal.      Breath sounds: Normal breath sounds.   Chest:      Chest wall: Tenderness present.   Abdominal:      General: Abdomen is flat. Bowel sounds are normal.      Palpations: Abdomen is soft.   Musculoskeletal:      Comments: Multiple amputated fingers    Skin:     General: Skin is warm.   Neurological:      General: No focal deficit present.      Mental Status: She is  alert.   Psychiatric:         Mood and Affect: Mood normal.          LINES, CATHETERS, DRAINS, AIRWAYS, & WOUNDS   Lines, drains, airways, & wounds:  Peripheral IV (Adult) 04/13/23 Anterior;Left;Proximal Forearm (Active)   Number of days: 5       External Urinary Catheter (Active)   Number of days: 3        LABS, IMAGING, & TELE   Labs:    Results from last 7 days   Lab Units 04/17/23  0539 04/16/23  0427 04/15/23  0834   WBC K/uL 8.42 9.39 10.12   HEMOGLOBIN g/dL 9.7* 10.0* 10.6*   HEMATOCRIT % 32.5* 33.4* 34.4*   PLATELETS K/uL 264 262 230       Results from last 7 days   Lab Units 04/17/23  0539 04/16/23  1201 04/15/23  2155 04/14/23  0544 04/13/23  1949   SODIUM mEQ/L 140 138 139   < > 140   POTASSIUM mEQ/L 4.5 4.1 4.0   < > 4.6   CHLORIDE mEQ/L 107 105 105   < > 107   CO2 mEQ/L 25 25 26   < > 25   BUN mg/dL 25* 27* 29*   < > 15   CREATININE mg/dL 1.0 0.9 1.0   < > 1.0   CALCIUM mg/dL 9.1 9.1 9.1   < > 9.4   ALBUMIN g/dL  --   --   --   --  3.0*   BILIRUBIN TOTAL mg/dL  --   --   --   --  0.2*   ALK PHOS IU/L  --   --   --   --  78   ALT IU/L  --   --   --   --  9*   AST IU/L  --   --   --   --  16   GLUCOSE mg/dL 78 84 85   < > 96    < > = values in this interval not displayed.     Imaging personally reviewed (does not include unread studies):  X-RAY CHEST 2 VIEWS    Result Date: 4/17/2023  CLINICAL HISTORY: Rule out pthx COMPARISON: Comparison is made to an exam earlier on the same day. COMMENT: TECHNIQUE: PA and lateral views of the chest were performed. LINES/TUBES/HARDWARE: Monitoring leads/wires overlie the patient. LUNGS AND PLEURA: See impression CARDIOMEDIASTINUM: Unchanged cardiomegaly and aortic atherosclerosis. SKELETON/OTHER: No acute osseous abnormality.     IMPRESSION: 1.  Linear interface in the right lung seen previously is no longer identified suggesting that it was likely a skinfold artifact. No definite pneumothorax otherwise seen. 2.  Cardiomegaly with mild vascular and interstitial  prominence on a background of emphysema and underlying interstitial lung disease.    Ultrasound venous arm left    Result Date: 4/17/2023  CLINICAL HISTORY:  There is a reported history of pulmonary embolism with left upper extremity edema, evaluate for deep venous thrombosis. COMMENT: Ultrasound examination of the left upper extremity was performed.  There are no prior films available for comparison.  Grayscale, color and spectral Doppler scans demonstrate a normal grayscale appearance with color flow identified in the left internal jugular, subclavian, axillary, brachial, cephalic and basilic veins.  There are no findings to suggest acute deep venous thrombosis.     IMPRESSION: No evidence of left upper extremity deep venous thrombosis.    X-RAY CHEST 1 VIEW    Result Date: 4/17/2023  CLINICAL HISTORY: chest pain and SOB COMPARISON: 4/13/2023 COMMENT: TECHNIQUE: Single frontal view of the chest was performed. LINES/TUBES/HARDWARE: Monitoring leads/wires overlie the patient. LUNGS AND PLEURA: See impression CARDIOMEDIASTINUM: The cardiomediastinal silhouette is stable SKELETON/OTHER: Degenerative changes of the spine and shoulders.     IMPRESSION: 1.  Cardiomegaly with mild vascular and interstitial prominence similar to prior, and the setting of known scleroderma associated ILD. 2.  Linear interface of the right apex may be related to a skinfold artifact but suggest follow-up to confirm absence of pneumothorax. Findings called to the patient's nurse at 11:14 am 4/17/23.     Telemetry/EKG:  I have independently reviewed the telemetry. No events for the last 24 hours.     ASSESSMENT & PLAN   * Dyspnea  Assessment & Plan  Has history of advanced scleroderma complicated by ILD and pulmonary hypertension.  Has a chronic baseline nonproductive cough, chronic hemoptysis and shortness of breath.  Presenting with acute on chronic hemoptysis, worsening shortness of breath and chest pain.  No evidence of ACS.  Recent  hospitalization at [facility] for similar symptoms, VQ scan was positive for PE (in the setting of her lung disease), she was sent home on apixaban.  Had no improvement in symptoms.  CTA at admission with no evidence of PE, moderate pericardial effusion, groundglass opacities mainly in the bases.  Resting tremor slightly worse.    Likely multifactorial etiology related to slight volume overload in the setting of chronic lung disease (pHTN) and CVA deconditioning/debility.    -Holding diuresis given patient does not examine volume overloaded and TTE unchanged from prior   -Hold apixaban given worsening hemoptysis and nosebleeds  - PT/OT consulted- Rec home with home health and home PT  - palliative team consulted-- pt opting for restorative care at this time     History of pulmonary embolism  Assessment & Plan  hospitalized on 3/19/2023 at [facility] for shortness of breath and was diagnosed with a PE on VQ scan showing Right apical wedge shaped perfusion defect. She had negative upper and lower extremity US.  No evidence of PE on CTA.     - hold apixban  - pulm consulted, aj recs    Pulmonary hypertension (CMS/HCC)  Assessment & Plan  Pulmonary Hypertension (WHO Group I)  Occurring in the background of Systemic Scleroderma with ILD.   August 2022 TTE showed normal RV size and function, but progressive exertional decline, worsened DLCO and resting tachycardia  Concern for progression of her pulmonary vascular disease and limitation of cardiac output.   Follows with Dr Arvizu.    - Continue Sildenafil 20 mg TID  - Macitentan prior approval in progress  - She is euvolemic on exam and by echocardiography. Would hold further diuresis  - Dr. Arvizu following      Anemia  Assessment & Plan  Hemoglobin of 10.6 from baseline of 11-12.  In the setting of worsening baseline hemoptysis.  MCV of 88.  Iron and iron sat low (29 and 12 respectively). B12 normal     - Start iron supp  -Hold apixaban      Acute on chronic heart  failure with preserved ejection fraction (CMS/HCC)  Assessment & Plan  Not overloaded on exam or by Echo  Chronic diastolic CHF    - Hold further diuresis    Weight loss  Assessment & Plan  Reports up to 30 pounds weight loss over the past 6 months.  Severe Protein-Calorie Malnutrition    -Consult to nutrition  -Need age-appropriate cancer screening with discharge    Debility  Assessment & Plan  Pt/ot consulted- Rec home with home health care and home PT      Vertigo  Assessment & Plan  Continue meclizine as needed     Hemoptysis  Assessment & Plan  Pt with history of hemoptysis at baseline but appears to be somewhat increased now on AC for PE    -hold apixaban, no need to restart given VQ scan iso of ILD  -Trend Hgb and transfuse as needed  -pulm following     Pericardial effusion  Assessment & Plan  New compared to prior imaging  Unclear etiology    -Assess further with Echocardiogram    Scleroderma (CMS/HCC)  Assessment & Plan  Not currently on any therapy   Cellcept has been suggested and refused in the past.    -Needs outpatient follow up    Raynaud's disease  Assessment & Plan  Hx Systemic Scleroderma / Raynaud's disease per Rheumatology (Dr. Giraldo).  Autoamputation of multiple fingers.     -continue CCB, outpatient follow up    Essential (primary) hypertension  Assessment & Plan  Continue Amlodipine.         VTE Assessment: Padua    VTE Prophylaxis: Current anticoagulants:  enoxaparin (LOVENOX) syringe 40 mg, subcutaneous, Daily (6p)      Code Status: Full Code  Estimated discharge date: 4/17/2023     ATTENDING DOCUMENTATION  ALSO SEE ATTENDING ATTESTATION SECTION OF NOTE

## 2023-04-18 NOTE — PLAN OF CARE
Problem: Fall Injury Risk  Goal: Absence of Fall and Fall-Related Injury  Outcome: Progressing     Problem: Skin Injury Risk Increased  Goal: Skin Health and Integrity  Outcome: Progressing     Problem: Heart Failure Comorbidity  Goal: Maintenance of Heart Failure Symptom Control  Outcome: Progressing     Problem: Pain Chronic (Persistent) (Comorbidity Management)  Goal: Acceptable Pain Control and Functional Ability  Outcome: Progressing

## 2023-04-19 VITALS
DIASTOLIC BLOOD PRESSURE: 57 MMHG | SYSTOLIC BLOOD PRESSURE: 130 MMHG | BODY MASS INDEX: 18.3 KG/M2 | HEIGHT: 66 IN | RESPIRATION RATE: 17 BRPM | HEART RATE: 82 BPM | OXYGEN SATURATION: 97 % | WEIGHT: 113.9 LBS | TEMPERATURE: 98.2 F

## 2023-04-19 PROCEDURE — 63700000 HC SELF-ADMINISTRABLE DRUG: Performed by: STUDENT IN AN ORGANIZED HEALTH CARE EDUCATION/TRAINING PROGRAM

## 2023-04-19 PROCEDURE — 99238 HOSP IP/OBS DSCHRG MGMT 30/<: CPT | Performed by: HOSPITALIST

## 2023-04-19 PROCEDURE — G0378 HOSPITAL OBSERVATION PER HR: HCPCS

## 2023-04-19 PROCEDURE — 63700000 HC SELF-ADMINISTRABLE DRUG

## 2023-04-19 RX ORDER — LIDOCAINE 560 MG/1
1 PATCH PERCUTANEOUS; TOPICAL; TRANSDERMAL DAILY PRN
Qty: 7 PATCH | Refills: 0 | Status: SHIPPED | OUTPATIENT
Start: 2023-04-19 | End: 2023-04-19 | Stop reason: HOSPADM

## 2023-04-19 RX ADMIN — SILDENAFIL 20 MG: 20 TABLET, FILM COATED ORAL at 10:51

## 2023-04-19 RX ADMIN — SILDENAFIL 20 MG: 20 TABLET, FILM COATED ORAL at 15:31

## 2023-04-19 RX ADMIN — LIDOCAINE 1 PATCH: 4 PATCH TOPICAL at 11:01

## 2023-04-19 RX ADMIN — LEVOTHYROXINE SODIUM 100 MCG: 0.1 TABLET ORAL at 05:55

## 2023-04-19 RX ADMIN — AMLODIPINE BESYLATE 10 MG: 10 TABLET ORAL at 10:51

## 2023-04-19 RX ADMIN — CYANOCOBALAMIN TAB 1000 MCG 1000 MCG: 1000 TAB at 10:51

## 2023-04-19 RX ADMIN — ACETAMINOPHEN 650 MG: 325 TABLET ORAL at 05:59

## 2023-04-19 NOTE — PROGRESS NOTES
"Brief Nutrition Note    Recommendations     -Continue current diet Regular diet + FR per clinical team-and encourage PO intake >75% of each meal, as tolerated.   -Continue Boost Plus TID for additional nourishment.   -antiemetics as needed to optimize PO intake.   -Monitor BMO with Mg/Po4.     Clinical Course: Patient is a 62 y.o. female who was admitted on 4/13/2023 with a diagnosis of Cardiomegaly [I51.7]  Pulmonary fibrosis (CMS/HCC) [J84.10]  Pericardial effusion [I31.39].     Past Medical History:   Diagnosis Date    De Quervain's tenosynovitis     Essential (primary) hypertension     Follicular carcinoma of thyroid (CMS/HCC)     Gastro-esophageal reflux     Hand ulceration (CMS/HCC)     Hyperlipidemia, unspecified     Interstitial lung disease (CMS/HCC)     Leg ulcer (CMS/HCC)     Lung nodule     Lupus (CMS/HCC)     Osteomyelitis of hand (CMS/HCC)     Osteoporosis     Pulmonary hypertension (CMS/HCC)     Raynaud's disease     Severe    Reflux esophagitis     Scleroderma (CMS/HCC)     Screening mammogram for breast cancer 05/04/2021    BI-RADS category: 1 - Negative (care everywhere @ [place])     Past Surgical History:   Procedure Laterality Date    CARDIAC CATHETERIZATION      COLONOSCOPY      HERNIA REPAIR         Reason for Assessment  Reason For Assessment: identified at risk by screening criteria, physician consult     [facility] Nutrition Screen Tool  Has patient lost weight without trying?: 3-->Yes, 24-33 lbs  Has patient been eating poorly due to decreased appetite?: 1-->Yes  MST Nutrition Screen Score: 4     Nutrition/Diet History  Intake (%): 75%    Physical Findings  Last Bowel Movement: 04/16/23  Skin: intact     [id number] Physical Appearance  Last Bowel Movement: 04/16/23  Skin: intact     Nutrition Order  Nutrition Order: meets nutritional requirements  Nutrition Order Comments: regular + 2000mL FR     Anthropometrics  Height: 167.6 cm (5' 6\")           Current Weight  Weight Method: " Standing scale  Weight: 51.7 kg (113 lb 14.4 oz)     Ideal Body Weight (IBW)  Ideal Body Weight (IBW) (kg): 59.58  % Ideal Body Weight: 83.93     Body Mass Index (BMI)  BMI (Calculated): 18.4  Nutritional Status/Malnutrition: Chronic illness - Severe Malnutrition     Labs/Procedures/Meds  Lab Results Reviewed: reviewed, pertinent   Lab Results   Component Value Date    GLUCOSE 82 04/18/2023    CALCIUM 9.1 04/18/2023     04/18/2023    K 4.4 04/18/2023    CO2 23 04/18/2023     04/18/2023    BUN 26 (H) 04/18/2023    CREATININE 1.1 04/18/2023     Medications  Pertinent Medications Reviewed: reviewed, pertinent    amLODIPine  10 mg oral Daily    cyanocobalamin  1,000 mcg oral Daily    enoxaparin  40 mg subcutaneous Daily (6p)    [Provider Managed Hold] furosemide  20 mg intravenous Daily    levothyroxine  100 mcg oral Daily    melatonin  3 mg oral Nightly    pantoprazole  40 mg oral Daily    sildenafiL (pulm.hypertension)  20 mg oral TID       Estimated/Assessed Needs  Additional Documentation: Calorie Requirements (Group), Protein Requirements (Group)     Skin: free from pressure injuries  No edema noted    Clinical comments:  Pt seen for follow up.   In bed a time of visit-appeared upset about discharge planning. Pt reports fair appetite and PO intake. Snacks at bedside but pt states that she is not much of a sweets person. Receiving ONS boost TID-has been drinking these. Pt has ensure delivered to home but reports liking boost flavor more. Offered suggestion to add ensure to smoothies to help conceal taste. Pt is amenable to trying this. Per nursing documentation, pt with 75% PO intake. -optimal.   Pt does endorse some nausea; unsure if she has received any antiemetics.     No additional questions/concerns regarding nutrition at this time.     Will continue to monitor and reassess per protocol unless otherwise consulted.     Goals:PO intake >75% of nutrient needs.  Monitor:Diet order, appetite, PO  intake, labs, I/Os, skin integrity, wt status, GI function.     Recommendations: See above     Date: 04/19/23  Signature: BECCA Matthews

## 2023-04-19 NOTE — DISCHARGE INSTRUCTIONS
Dear Mrs Felix,    You came to the Emergency Department because you were having shortness of breath, chest pain and blood in your sputum. We admitted you to the hospital and we gave you diuretic medication with an IV.      We stopped your eliquis and your hemoptysis resolved. You were evaluated by our pulmonary team, your shortness of breath is likely related to progression of your scleroderma. We gave you IV diuretics. Your oxygen level was okay throughout your hospitalization. We strongly encourage you to follow-up with pulmonary and rheumatology outpatient as you need to discuss possible treatment options for scleroderma including mycophenolate (CellCept) etc. Scleroderma is a chronic disease and most treatments options only limited to progression. You will also need to follow-up with the rheumatologist for further evaluation and management.    Also seen by your cardiologist during your hospitalization. We continued your home sildenafil. You need to follow-up with Dr. Arvizu as an outpatient.    Concerning your chest pain, it is likely related to a muscle sprain.  We tried a lidocaine patch however this did have some side effects.  We do encourage you to use warm and cold compresses and ice packs as needed.     Please bring this discharge paperwork to all of your follow up appointments. If you experience recurrence of your symptoms, please do not hesitate to call your primary care doctor or present to the emergency department for evaluation. It was a pleasure taking care of you at [facility]! We wish you the best of health.     TO DO:  [ ] Please follow up with your PCP.  [ ] Please follow up with your rheumatologist, pulmonologist and cardiologist.  [ ] Please discuss starting for scleroderma with your rheumatologist.  [ ] Please stop taking Eliquis.  [ ] If you experience worsening shortness of breath, severe chest pain, dizziness, loss of consciousness, more blood in your sputum, or any new  concerning symptoms: Please seek immediate medical attention.

## 2023-04-19 NOTE — PLAN OF CARE
Care Coordination Discharge Plan Summary   Date: 4/19/2023   Time: 1:21 PM    Patient Name: Arielle Felix  Medical Record Number: [id number]  YOB: 1960  Room/BED: 0348D    General Information  Patient-Specific Goals (Include Timeframe)     PCP: Sara Simmons MD   Insurance Coverage: [employer]  Readmission Within the last 30 days       Advance Directives (for Healthcare)?  Does patient have advance directive?: No  Patient does not have Advance Directive: Patient/Family declines further information  Does patient have current OOH DNR form?: No  Patient does not have current OOH DNR form: Patient/Family declines further information  Does patient have current POLST?: No  Patient does not have current POLST: Patient/Family declines further information  Does patient have mental health advance directive?: No  Patient does not have Mental Health Advance Directive: Patient/Family declines further information     Living Arrangements  Arrived From: home  Current Living Arrangements: home  People in Home: alone  Home Accessibility: stairs to enter home (Group), stairs within home (Group)  Living Arrangement Comments: pt lives alone in 2 story home, 4 LARRY, stairglide inside, bed/bathroom upstairs.  Able to Return to Prior Arrangements: yes    Housing Stability and Financial Resources (SDOH)  In the last 12 months, was there a time when you were not able to pay the mortgage or rent on time?: No  In the last 12 months, was there a time when you did not have a steady place to sleep or slept in a shelter (including now)?: No    Type of Current Home Care Services  home health aide    Assistive Device/Animal Currently Used at Home  none    Concerns to be Addressed  discharge planning, care coordination/care conferences    Current Discharge Risk  chronically ill    Anticipated Changes Related to Illness  none    Transportation Concerns (SDOH)  Transportation Anticipated: family or friend will provide  In the past  12 months, has lack of transportation kept you from medical appointments or from getting medications?: No  In the past 12 months, has lack of transportation kept you from meetings, work, or from getting things needed for daily living?: No    Concerns Comments  Per rounds patient stable for discharge today. Family to provide transportation home. Update sent via care port to [facility] regarding discharge.    Anticipated Discharge Plan   Anticipated Discharge Disposition: home with home health  Type of Home Care Services: home health aide, nursing, home PT, home OT  Patient/Family Anticipates Transition to: home with help/services  Patient/Family Anticipated Services at Transition: home health care  Met with patient. Provided education and contact information for Care Coordination services.: yes  Screening complete: yes    Discharge Assessment  Current Discharge Risk: chronically ill  Concerns to be Addressed: discharge planning, care coordination/care conferences  Anticipated Changes Related to Illness: none    Concerns Comments: Per rounds patient stable for discharge today. Family to provide transportation home. Update sent via care port to [facility] regarding discharge.    Patient Choice  Offered/Gave Vendor List: yes  Patient's Choice of Community Agency(s): [facility]    Patient and/or patient guardian/advocate was made aware of their right to choose a provider. A list of eligible providers was presented and reviewed with the patient and/or patient guardian/advocate in written and/or verbal form. The list delineates providers in the patients desired geographic area who are participating in the Medicare program and/or providers contracted with the patients primary insurance. The Medicare list and quality ratings were obtained from the Medicare.gov [medicare.gov] website.    Discharge Transportation   Does the patient need discharge transport arranged?: No        Discharge Barriers   Barriers to Discharge: None

## 2023-04-19 NOTE — DISCHARGE SUMMARY
Internal Medicine  Inpatient Discharge Summary        BRIEF OVERVIEW   Admitting Provider: Teri Castañeda MD  Attending Provider: Shamir Leary MD Attending phys phone: [phone]    PCP: Sara Simmons [phone]    Admission Date: 4/13/2023  Discharge Date: 4/19/2023     DISCHARGE DIAGNOSES      Primary Discharge Diagnosis  Dyspnea    Secondary Discharge Diagnoses  Active Hospital Problems    Diagnosis Date Noted    Dyspnea 04/14/2023     Priority: High    Interstitial lung disease (CMS/HCC) 11/28/2022     Priority: High    History of pulmonary embolism 04/14/2023     Priority: Medium    Pulmonary hypertension (CMS/HCC) 08/09/2022     Priority: Medium    Hemoptysis 04/14/2023    Vertigo 04/14/2023    Debility 04/14/2023    Weight loss 04/14/2023    Acute on chronic heart failure with preserved ejection fraction (CMS/HCC) 04/14/2023    Anemia 04/14/2023    Palliative care by specialist 04/14/2023    Pericardial effusion 04/13/2023    Scleroderma (CMS/HCC) 04/06/2023    Shortness of breath 08/12/2022    Essential (primary) hypertension 08/09/2022    Raynaud's disease 08/09/2022      Resolved Hospital Problems   No resolved problems to display.       Active Problem List on Day of Discharge  Patient Active Problem List   Diagnosis    Immunocompromised state (CMS/HCC)    Open wound of right foot    Essential (primary) hypertension    Hyperlipidemia, unspecified    Raynaud's disease    Pulmonary hypertension (CMS/HCC)    Shortness of breath    Interstitial lung disease (CMS/HCC)    Scleroderma (CMS/HCC)    Right ventricular dysfunction    Pericardial effusion    Hemoptysis    History of pulmonary embolism    Vertigo    Debility    Dyspnea    Weight loss    Acute on chronic heart failure with preserved ejection fraction (CMS/HCC)    Anemia    Palliative care by specialist     SUMMARY OF HOSPITALIZATION      Presenting Problem/History of Present Illness  62 y.o. female  with a past medical history of Pulmonary HTN, HFpEF, HTN, Raynaud's Disease, Cutaneous Systemic Scleroderma (dx 1998), ILD, recently diagnosed PE (3/2023, on VQ scan at [facility]), protein calorie malnutrition,chronic vertigo on meclizine, who presents with chest pressure, shortness of breath, worsening hemoptysis since starting apixaban, and palpitations on exertion x 1 month.  Seen at [facility] last month and had unmatched perfusion defect RUL on VQ scan there and started on apixaban; has not felt better since then and has had worsening of her epistaxis and hemoptysis. Below is a summary of problems addressed during this hospitalization.    Hospital Course  #Dyspnea  #Hemoptysis  #Chest pain  #Scleroderma associated ILD, PAH  #RV dysfunction  Presenting with acute on chronic dyspnea, exertional and nonexertional.  She had good oxygen saturation and did not need oxygen supplementation throughout her hospitalization.  TTE with EF 65%, G1 DD, RVSP of 34.  She did examine slightly overloaded on admission and received gentle IV diuresis.  Evaluated by cardiology and pulmonary inpatient.  Her symptoms was felt currently related to progression of her scleroderma with associated ILD and PAH.  She had a recent hospitalization at [facility] for similar symptoms.  Had an elevated D-dimer, VQ scan was done showing Right apical wedge shaped perfusion defect.  Ultrasound lower extremities were negative at [facility] and the ER.  CT PE on admission was negative for PE.  Given her ongoing hemoptysis that is worse from baseline, Eliquis was discontinued.  Hemoptysis resolved.  She had chronic left-sided chest pain, reproducible on examination, chest CT scan with no evidence of rib fractures, troponin negative, no regional wall abnormality on TTE, EKG not ischemic.  Likely musculoskeletal chest pain and possible costochondritis.  -Needs follow-up for further evaluation and treatment of her scleroderma with her rheumatologist and pulmonologist  and plan  -Discontinue Eliquis  -Continue sildenafil 20 mg 3 times daily, follow-up with cardiology  -Consider initiation of CellCept or antifibrotic    #Hemoptysis suspect likely due to chronic bronchiectasis//demonstrated ILD, resolved with discontinuing anticoagulation as above.  -stop Eliquis, follow-up with pulmonary    #Systemic scleroderma with NSIP pattern on CT scan  Moderately severe sickle lung disease, follows with pulmonary and rheumatology at [place].  Outpatient follow-up, consider antifibrotic's    #Hypertension,  Continue with amlodipine    #Chronic vertigo  Continue with as needed meclizine    #Debility  PT/OT evaluated, palliative evaluated.    Exam on Day of Discharge  Physical Exam  Vitals reviewed.   Constitutional:       Appearance: Normal appearance. She is ill-appearing.   HENT:      Head: Normocephalic and atraumatic.   Cardiovascular:      Rate and Rhythm: Normal rate and regular rhythm.      Pulses: Normal pulses.   Pulmonary:      Effort: Pulmonary effort is normal.      Breath sounds: Normal breath sounds.   Chest:      Chest wall: Tenderness present.   Abdominal:      General: Abdomen is flat. Bowel sounds are normal.      Palpations: Abdomen is soft.   Musculoskeletal:      Comments: Multiple amputated fingers    Skin:     General: Skin is warm.   Neurological:      General: No focal deficit present.      Mental Status: She is alert.   Psychiatric:         Mood and Affect: Mood normal.    Consults During Admission  IP CONSULT TO CARDIOLOGY  IP CONSULT TO PAIN/PALLIATIVE CARE  IP CONSULT TO NUTRITION SERVICES  IP CONSULT TO PULMONOLOGY/SLEEP MEDICINE  IP CONSULT TO SPIRITUAL HEALTH AND EDUCATION    DISCHARGE MEDICATIONS   New, changed, or stopped medications from this admission:       Medication List      ASK your doctor about these medications    * amLODIPine 5 mg tablet  Commonly known as: NORVASC  Take 10 mg by mouth every morning.  Dose: 10 mg  Ask about: Which instructions should I  use?     * amLODIPine 5 mg tablet  Commonly known as: NORVASC  Take 5-10 mg by mouth nightly as needed (for high BP or Raynauds phenomenon of the fingers).  Dose: 5-10 mg  Ask about: Which instructions should I use?     biotin 1 mg tablet  Take 1,000 mcg by mouth daily.  Dose: 1,000 mcg     cholecalciferol (vitamin D3) 1,000 unit (25 mcg) tablet  Take 1,000 Units by mouth daily.  Dose: 1,000 Units     cyanocobalamin (vitamin B-12) 1,000 mcg capsule  Take 1,000 mcg by mouth daily.  Dose: 1,000 mcg     diclofenac sodium 1 % topical gel  Commonly known as: VOLTAREN  Apply topically 2 (two) times a day as needed for pain.     docusate sodium 100 mg capsule  Commonly known as: COLACE  Take 100 mg by mouth daily.  Dose: 100 mg     levothyroxine 100 mcg tablet  Commonly known as: SYNTHROID  Take 100 mcg by mouth daily.  Dose: 100 mcg     meclizine 25 mg tablet  Commonly known as: ANTIVERT  Take 25 mg by mouth daily as needed.  Dose: 25 mg     mupirocin 2 % ointment  Commonly known as: BACTROBAN  Apply 1 application. topically daily. Behind ears  Dose: 1 application.     sildenafiL (pulm.hypertension) 20 mg tablet  Commonly known as: REVATIO  Take 1 tablet (20 mg total) by mouth 3 (three) times a day.  Dose: 20 mg         * This list has 2 medication(s) that are the same as other medications prescribed for you. Read the directions carefully, and ask your doctor or other care provider to review them with you.                Non-Medication orders at discharge:   Instructions for after discharge     Call provider for:  difficulty breathing, headache or visual disturbances      Call provider for:  extreme fatigue      Call provider for:  persistent dizziness or light-headedness      Call provider for:  persistent nausea or vomiting      Call provider for:  rash      Call provider for:  severe uncontrolled pain      Call provider for:  temperature >100.4      Discharge diet      Diet Type / Texture:  Regular  Cardiac (Low  Sodium, Low Fat)       Fluid restriction dietary / 24h: 1500 mL Fluid    Follow-up with Provider:      Phani Giraldo MD   [phone]    RHEUMATOLOGY [facility]  [address] [place] [address], [facility] [address]       Follow-up with Provider:      Timothy Arvizu DO   [phone]    [address]  [facility], [address]  [facility] [address]       Follow-up with primary physician (PCP)      Follow-up with provider      Please follow up with pulmonary    Flesch, Judd D, MD   [phone]    [address] [address]       Post-Discharge Activity: Normal activity as tolerated.      Normal activity as tolerated.                    PROCEDURES / LABS / IMAGING      Operative Procedures  None    Other Procedures  none    Pertinent Labs  Results from last 7 days   Lab Units 04/18/23  1159 04/17/23  0539 04/16/23  0427   WBC K/uL 6.95 8.42 9.39   HEMOGLOBIN g/dL 10.1* 9.7* 10.0*   HEMATOCRIT % 33.0* 32.5* 33.4*   PLATELETS K/uL 256 264 262     Results from last 7 days   Lab Units 04/18/23  1159 04/17/23  0539 04/16/23  1201 04/14/23  0544 04/13/23  1949   SODIUM mEQ/L 139 140 138   < > 140   POTASSIUM mEQ/L 4.4 4.5 4.1   < > 4.6   CHLORIDE mEQ/L 108 107 105   < > 107   CO2 mEQ/L 23 25 25   < > 25   BUN mg/dL 26* 25* 27*   < > 15   CREATININE mg/dL 1.1 1.0 0.9   < > 1.0   CALCIUM mg/dL 9.1 9.1 9.1   < > 9.4   ALBUMIN g/dL  --   --   --   --  3.0*   BILIRUBIN TOTAL mg/dL  --   --   --   --  0.2*   ALK PHOS IU/L  --   --   --   --  78   ALT IU/L  --   --   --   --  9*   AST IU/L  --   --   --   --  16   GLUCOSE mg/dL 82 78 84   < > 96    < > = values in this interval not displayed.     Pertinent Imaging  X-RAY CHEST 2 VIEWS    Result Date: 4/17/2023  IMPRESSION: 1.  Linear interface in the right lung seen previously is no longer identified suggesting that it was likely a skinfold artifact. No definite pneumothorax otherwise seen. 2.  Cardiomegaly with mild vascular and interstitial  prominence on a background of emphysema and underlying interstitial lung disease.    Ultrasound venous arm left    Result Date: 4/17/2023  IMPRESSION: No evidence of left upper extremity deep venous thrombosis.    X-RAY CHEST 1 VIEW    Result Date: 4/17/2023  IMPRESSION: 1.  Cardiomegaly with mild vascular and interstitial prominence similar to prior, and the setting of known scleroderma associated ILD. 2.  Linear interface of the right apex may be related to a skinfold artifact but suggest follow-up to confirm absence of pneumothorax. Findings called to the patient's nurse at 11:14 am 4/17/23.     Ultrasound venous leg bilateral    Result Date: 4/14/2023  IMPRESSION: No evidence of DVT in either lower extremity.    CT ANGIOGRAPHY CHEST PULMONARY EMBOLISM WITH IV CONTRAST    Result Date: 4/14/2023  IMPRESSION: 1. No CT evidence of pulmonary embolism. 2. Moderate cardiomegaly. 3. A small to moderate pericardial effusion is new from 2020. 4. Centrilobular emphysematous changes and bronchiectatic changes are present throughout the lungs. 5. There are patchy parenchymal infiltrates within the dependent portion of bilateral lower lobes which spares the periphery. Findings raise suspicion for superimposed pneumonia or inflammatory process. There is additional patchy parenchymal infiltrate within the right upper lobe. 6. A 9 mm groundglass opacity is present within the right upper lobe best visualized on axial image 48 of series 3. Follow-up as per Fleischner Society guidelines is advised. 7. Right axillary and mediastinal adenopathy as described above is nonspecific and may be related to the patient's known sarcoidosis. Other causes of lymphadenopathy such as malignancy is not excluded. 8. A new  8.2 mm solid nodule is present within the medial aspect of the left lower lobe. There are additional small nodules throughout both lungs. The possibility of malignancy or metastatic disease is not excluded. If the patient has  previous chest CTs from outside institutions, direct comparison would be useful to assess for stability versus interval change. Fleischner Society 2017 Guidelines for Management of Incidentally Detected Pulmonary Nodules in Adults. (Solid Nodules) Nodule size >8 mm (single) Low-Risk Patient - Consider CT, PET/CT or tissue sampling at 3 months High-Risk Patient - Consider CT, PET/CT or tissue sampling at 3 months Nodule size >8 mm (multiple) Low-Risk Patient - CT at 3-6 months then consider CT at 18-24 months High-Risk Patient - CT at 3-6 months, then at 18-24 months     X-RAY CHEST 1 VIEW    Result Date: 4/13/2023  IMPRESSION: 1. Mild pulmonary vascular congestion suspected. 2. Cardiomegaly. 3. Stable diffuse interstitial prominence, likely chronic.      OUTPATIENT  FOLLOW-UP / REFERRALS / PENDING TESTS      Outpatient Follow-Up Appointments            In 2 months [facility]    In 3 months [facility] Cardiology at [facility]    In 3 months Timothy Arvizu DO [facility] Cardiology at [facility]    In 4 months [place] [address] GENERAL [facility] - Radiology - Nuclear Medicine          Referrals  No orders of the defined types were placed in this encounter.    Important Issues to Address in Follow-Up  -follow up with pulmonary at [place]  -follow up with rheumatology   -consider starting cell-cept   -Stop eliquis   -Follow up with Cardiology    DISCHARGE DISPOSITION      Disposition: Home Health Care - Other    Code Status At Discharge: Full Code    Physician Order for Life-Sustaining Treatment Document Status      No documents found                 ATTENDING DOCUMENTATION  ALSO SEE ATTENDING ATTESTATION SECTION OF NOTE

## 2023-04-19 NOTE — PROGRESS NOTES
Medicine Attending:     I personally evaluated and examined the patient. I reviewed the chart, laboratory data and radiological examinations, and determined the diagnosis and plan. I discussed the case with the Resident/KARYNA/ENRIQUE/sub-I medical student and agree with the findings and plan as documented in the note except for my comments below or within the additional notes today.     Patient reports her ongoing symptoms persist with no change but says she would like to go home today.  Says she will follow-up with her outpatient physicians including pulmonologist and rheumatologist at [place].  Says she has an appointment with her [place] pulmonologist next week.    Afeb/vss  Exam General: AAOX3, frail appearing, chronically ill.  Hypopigmentation of face noted.   HEENT: dry mm, AT/NC EOMI/PERRLA  Lungs grossly ctab  CV RR, tachy   Abd soft,. BS x 4  Extrem trace edema  Skin no rash/ecchymosis   no reed  Neuro no focal deficits, CN intact  Psych normal affect  MSK no injury  Labs reviewed     Plan:  # Chest pain, dyspnea - most likely related to scleroderma associated ILD, PAH, hemoptysis.   - Pulm consulted.  Cont sildenafil. OP f/u to discuss other therapies.   - Follows with Dr. Arvizu, waiting on prior auth for Macitentan.  TTE with EF 65%, grade 1 diastolic, RVSP 34, small IVC.  Received some diuresis here.  No further inpatient cardiac management required per Dr. Arvizu.  - satting well on RA at rest and with ambulation   - cxr 4/17 noted possible R apical ptx, repeat cxr showed no ptx     # Hemoptysis - suspected 2/2 bronchiectasis, exacerbated by full AC. resolved, hg stable. Holding anticoagulation now.  If further changes, consider bronch.       # PE - US neg DVT, CT chest without PE.    - Stopped anticoagulation, cardiology and pulm agrees.       # Systemic scleroderma - outpt rheum Dr. Giraldo f/u to discuss cellcept, had declined in past.  Seen by palliative care, wishes to pursue continued treatment.      # ILD  from sarcoidosis - NSIP pattern on CT.  Moderately severe restrictive lung disease.  follows with Dr. Abbott, newly establishing care, has appointment next week per patient.     # HTN - on norvasc, BP stable  # Debility - PT/OT eval, palliative care.  Full code.    # Vertigo - meclizine prn ordered.   #Very minimal troponin elevation on admission felt to be acute myocardial injury- from CHF/pulm htn     Discharge home today with close outpatient follow-up.    Shamir Leary MD  Pager #[phone]     NOTE:   Some or all of the note above was created with the use of dictation software, please note this dictation software can have abnormalities in its ability to transcribe. Please contact me directly for anything that seems abnormal, for clarification.

## 2023-04-19 NOTE — PLAN OF CARE
Problem: Fall Injury Risk  Goal: Absence of Fall and Fall-Related Injury  Outcome: Progressing     Problem: Heart Failure Comorbidity  Goal: Maintenance of Heart Failure Symptom Control  Outcome: Progressing     Problem: Pain Chronic (Persistent) (Comorbidity Management)  Goal: Acceptable Pain Control and Functional Ability  Outcome: Progressing

## 2023-04-20 NOTE — UM PHYSICIAN REVIEW NOTE
Patient Name: Arielle Felix      MRN: [id number]    A phone call will be placed to payor to request Peer to Peer phone appeal.    Kina RIOS Rui, DO  4/20/2023 4/14 - [birth year]61 yo pulm htn, hfpef, scleroderma, PE in march p/w chest pain, hemoptysis since started apixaban, sob. Multiple symptoms, epistaxis, weigh tgain.   Exam: crackles, +JVD, cxr: vasc congestion cta without PE, diffuse GGO, sugesting of acute exacerbation of interstitial pulm fibrosis  Iv lasix x 2. pulm HTN consult.   Pulm: hold doac with hemoptysis, dvt proph only. Check TTE - appar euvolemic.   Us neg.  4/16 - still w/ chest pain.   4/17 - more sob as from before, lightheaded, iv lasix given.   4/18 - d/c

## 2023-05-25 NOTE — TELEPHONE ENCOUNTER
PALMA reports [facility] reportedly wishes a medication list before shipping OPSUMIMICHELLE    [facility]

## 2023-06-09 ENCOUNTER — APPOINTMENT (RX ONLY)
Dept: URBAN - METROPOLITAN AREA CLINIC 28 | Facility: CLINIC | Age: 63
Setting detail: DERMATOLOGY
End: 2023-06-09

## 2023-06-09 DIAGNOSIS — I73.00 RAYNAUD'S SYNDROME WITHOUT GANGRENE: ICD-10-CM

## 2023-06-09 DIAGNOSIS — M34.83 SYSTEMIC SCLEROSIS WITH POLYNEUROPATHY: ICD-10-CM | Status: INADEQUATELY CONTROLLED

## 2023-06-09 PROCEDURE — ? COUNSELING

## 2023-06-09 PROCEDURE — ? PRESCRIPTION

## 2023-06-09 PROCEDURE — ? PHOTO-DOCUMENTATION

## 2023-06-09 PROCEDURE — 99204 OFFICE O/P NEW MOD 45 MIN: CPT

## 2023-06-09 PROCEDURE — ? PRESCRIPTION MEDICATION MANAGEMENT

## 2023-06-09 RX ORDER — PENTOXIFYLLINE 400 MG/1
1 TABLET, EXTENDED RELEASE ORAL TID
Qty: 90 | Refills: 3 | Status: ERX | COMMUNITY
Start: 2023-06-09

## 2023-06-09 RX ORDER — FLUOCINOLONE ACETONIDE 0.11 MG/ML
1 OIL TOPICAL
Qty: 118.28 | Refills: 3 | Status: ERX | COMMUNITY
Start: 2023-06-09

## 2023-06-09 RX ORDER — TRIAMCINOLONE ACETONIDE 1 MG/G
1 OINTMENT TOPICAL
Qty: 454 | Refills: 1 | Status: ERX | COMMUNITY
Start: 2023-06-09

## 2023-06-09 RX ADMIN — PENTOXIFYLLINE 1: 400 TABLET, EXTENDED RELEASE ORAL at 00:00

## 2023-06-09 RX ADMIN — FLUOCINOLONE ACETONIDE 1: 0.11 OIL TOPICAL at 00:00

## 2023-06-09 RX ADMIN — TRIAMCINOLONE ACETONIDE 1: 1 OINTMENT TOPICAL at 00:00

## 2023-06-09 ASSESSMENT — LOCATION DETAILED DESCRIPTION DERM
LOCATION DETAILED: LEFT PROXIMAL DORSAL MIDDLE FINGER
LOCATION DETAILED: RIGHT DORSAL FOOT
LOCATION DETAILED: RIGHT MIDDLE FINGERNAIL
LOCATION DETAILED: RIGHT MEDIAL FRONTAL SCALP
LOCATION DETAILED: RIGHT PROXIMAL DORSAL FOREARM
LOCATION DETAILED: LEFT DISTAL DORSAL FOREARM
LOCATION DETAILED: SUPERIOR MID FOREHEAD
LOCATION DETAILED: LEFT DORSAL FOOT

## 2023-06-09 ASSESSMENT — LOCATION SIMPLE DESCRIPTION DERM
LOCATION SIMPLE: RIGHT SCALP
LOCATION SIMPLE: RIGHT FOREARM
LOCATION SIMPLE: RIGHT MIDDLE FINGER
LOCATION SIMPLE: LEFT FOREARM
LOCATION SIMPLE: LEFT MIDDLE FINGER
LOCATION SIMPLE: RIGHT FOOT
LOCATION SIMPLE: SUPERIOR FOREHEAD
LOCATION SIMPLE: LEFT FOOT

## 2023-06-09 ASSESSMENT — LOCATION ZONE DERM
LOCATION ZONE: FINGER
LOCATION ZONE: ARM
LOCATION ZONE: SCALP
LOCATION ZONE: FEET
LOCATION ZONE: FACE

## 2023-06-09 NOTE — PROCEDURE: MIPS QUALITY
Quality 130: Documentation Of Current Medications In The Medical Record: Current Medications Documented
Detail Level: Detailed
Quality 431: Preventive Care And Screening: Unhealthy Alcohol Use - Screening: Patient not screened for unhealthy alcohol use using a systematic screening method
Quality 226: Preventive Care And Screening: Tobacco Use: Screening And Cessation Intervention: Tobacco Screening not Performed

## 2023-06-09 NOTE — PROCEDURE: PRESCRIPTION MEDICATION MANAGEMENT
Initiate Treatment: pentoxifylline  mg tablet,extended release: Take one pill PO TID
Detail Level: Zone
Plan: Will add pentoxifylline in addition to amlodipine - though to check with Dr. Perez knott.
Render In Strict Bullet Format?: No
Initiate Treatment: Derma-Smoothe/FS Scalp Oil 0.01 %: Apply a few scattered drops to the scalp QHS\\ntriamcinolone acetonide 0.1 % topical ointment: Apply a thin layer to the AA QDAY, be careful applying to feet and hands
Plan: All skin changes are attributable to scleroderma including salt and pepper patches.  Treat itch with topical steroids (fluocinolone oil to scalp, triamcinolone ointment to skin)\\n\\nPt declines medication for itch - would like to try mirtazapine, though pt adamant she doesn't want anything sedating.\\n\\nApparently pulm has concern for lung cancer - pt should continue f/u with PCP/pulm.

## 2023-06-21 ENCOUNTER — HOSPITAL ENCOUNTER (OUTPATIENT)
Dept: CARDIOLOGY | Facility: HOSPITAL | Age: 63
Discharge: HOME | End: 2023-06-21
Attending: INTERNAL MEDICINE
Payer: COMMERCIAL

## 2023-06-21 VITALS
HEART RATE: 98 BPM | WEIGHT: 113 LBS | SYSTOLIC BLOOD PRESSURE: 132 MMHG | BODY MASS INDEX: 18.16 KG/M2 | HEIGHT: 66 IN | DIASTOLIC BLOOD PRESSURE: 85 MMHG

## 2023-06-21 DIAGNOSIS — I27.20 PULMONARY HYPERTENSION (CMS/HCC): ICD-10-CM

## 2023-06-21 PROCEDURE — 93306 TTE W/DOPPLER COMPLETE: CPT

## 2023-06-21 PROCEDURE — 93306 TTE W/DOPPLER COMPLETE: CPT | Mod: 26 | Performed by: INTERNAL MEDICINE

## 2023-06-21 NOTE — TELEPHONE ENCOUNTER
Maria Antonia Bowen called states they received an email from [facility] regarding pt's medication list. Maria Antonia states [facility] states they have not received pt's list. Maria Antonia requests that list be faxed over    Fax     Maria Antonia can be reached at [phone]

## 2023-06-23 LAB
AORTIC ROOT ANNULUS - M-MODE: 3 CM
AORTIC VALVE MEAN VELOCITY: 1.2 M/S
AORTIC VALVE VELOCITY TIME INTEGRAL: 27.5 CM
ASCENDING AORTA: 3.5 CM
AV MEAN GRADIENT: 7 MMHG
AV PEAK GRADIENT: 13 MMHG
AV PEAK VELOCITY-S: 1.82 M/S
AV VALVE AREA INDEX: 1.52
AV VALVE AREA: 1.78 CM2
AV VELOCITY RATIO: 0.68
AVA (VTI): 2.34 CM2
BSA FOR ECHO PROCEDURE: 1.54 M2
CUSP SEPARATION: 1.7 CM
DOP CALC LVOT STROKE VOLUME: 64.39 CM3
E WAVE DECELERATION TIME: 190 MS
E/A RATIO: 0.7
E/E' RATIO: 13.3
E/LAT E' RATIO: 10.8
EDV (BP): 53.2 CM3
EF (A4C): 60.1 %
EF A2C: 61 %
EJECTION FRACTION: 59.8 %
ESV (BP): 21.4 CM3
FRACTIONAL SHORTENING: 34.11 %
HEART RATE: 98 BPM
INTERVENTRICULAR SEPTUM: 1.05 CM
LA ESV (BP): 49.7 CM3
LA ESV INDEX (A2C): 23.51 CM3/M2
LA ESV INDEX (BP): 32.27 CM3/M2
LAAS-AP2: 15.6 CM2
LAAS-AP4: 21.3 CM2
LAD 2D: 3.7 CM
LALD A4C: 5.53 CM
LALD A4C: 6.39 CM
LAV-S: 36.2 CM3
LEFT ATRIUM VOLUME INDEX: 38.51 CM3/M2
LEFT ATRIUM VOLUME: 59.3 CM3
LEFT INTERNAL DIMENSION IN SYSTOLE: 2.55 CM (ref 2.28–3.44)
LEFT VENTRICLE DIASTOLIC VOLUME INDEX: 34.61 CM3/M2
LEFT VENTRICLE DIASTOLIC VOLUME: 53.3 CM3
LEFT VENTRICLE SYSTOLIC VOLUME INDEX: 13.83 CM3/M2
LEFT VENTRICLE SYSTOLIC VOLUME: 21.3 CM3
LEFT VENTRICULAR INTERNAL DIMENSION IN DIASTOLE: 3.87 CM (ref 3.83–5.32)
LEFT VENTRICULAR POSTERIOR WALL IN END DIASTOLE: 1.19 CM (ref 0.49–0.92)
LV DIASTOLIC VOLUME: 52.6 CM3
LV ESV (APICAL 2 CHAMBER): 20.5 CM3
LVAD-AP2: 21.3 CM2
LVAD-AP4: 22.4 CM2
LVAS-AP2: 12.5 CM2
LVAS-AP4: 12.8 CM2
LVEDVI(A2C): 34.16 CM3/M2
LVEDVI(BP): 34.55 CM3/M2
LVESVI(A2C): 13.31 CM3/M2
LVESVI(BP): 13.9 CM3/M2
LVLD-AP2: 7.65 CM
LVLD-AP4: 7.85 CM
LVLS-AP2: 6.55 CM
LVLS-AP4: 6.89 CM
LVOT 2D: 2.1 CM
LVOT A: 3.46 CM2
LVOT MG: 3 MMHG
LVOT MV: 0.79 M/S
LVOT PEAK VELOCITY: 1.19 M/S
LVOT PG: 6 MMHG
LVOT STROKE VOLUME INDEX: 41.81 ML/M2
LVOT VTI: 18.6 CM
MLH CV ECHO AVA INDEX VELOCITY RATIO: 1.2
MV E'TISSUE VEL-LAT: 0.06 M/S
MV E'TISSUE VEL-MED: 0.05 M/S
MV PEAK A VEL: 0.93 M/S
MV PEAK E VEL: 0.69 M/S
POSTERIOR WALL: 1.19 CM
PULM VEIN S/D RATIO: 1.3
PV PEAK D VEL: 0.28 M/S
PV PEAK S VEL: 0.36 M/S
RVOT VMAX: 0.89 M/S
RVOT VTI: 14.3 CM
SEPTAL TISSUE DOPPLER FREE WALL LATE DIA VELOCITY (APICAL 4 CHAMBER VIEW): 0.08 M/S
TAPSE: 1.42 CM
TR MAX PG: 50.69 MMHG
TRICUSPID VALVE PEAK REGURGITATION VELOCITY: 3.56 M/S
Z-SCORE OF LEFT VENTRICULAR DIMENSION IN END DIASTOLE: -1.53
Z-SCORE OF LEFT VENTRICULAR DIMENSION IN END SYSTOLE: -0.74
Z-SCORE OF LEFT VENTRICULAR POSTERIOR WALL IN END DIASTOLE: 2.98

## 2023-07-20 NOTE — PROGRESS NOTES
I had the pleasure of seeing Arielle Felix (: 1960) in the [facility] Heart Failure and Pulmonary Hypertension clinic on 2023. She presents today with her care giver, Sara.     Ms. Felix is a 62 y.o. female with a past medical history of Pulmonary HTN, HTN, Raynaud's Disease, Cutaneous Systemic Scleroderma (dx ), ILD, PE (3/2023). She is a patient of Dr. Nguyễn. She was previously followed by Dr. Jos Valdez at [facility]. She had stress test 2013 was negative for ischemia. Echocardiogram from 2022 showed LVEF: 55-60%, mild aortic valve thickening, pulmonary artery systolic pressure: 52 mmHg and trivial pericardial effusion. LHC and RHC (separate occasions) over the last 5-10 years which showed normal hemodynamics and normal coronaries. She had a RHC 2020 showing top-normal right sided filling pressures with mild pulmonary hypertension, top-normal PVR and normal pulmonary capillary wedge pressure. The cardiac index was normal, seen below.     On 2022, [place] [facility] [place]. EKG: NSR without ischemic changes. hsTrop: 12->16, d-dimer: 0.56, CXR showed cardiomegaly and diffuse interstitial prominence. She was discharged home and advised to follow up with cardiology.     Dr. Nguyễn ordered echocardiogram, which was completed on 2022 and showed estimated RVSP = 50-55 mmHg, normal-sized LV with normal LV systolic function. Estimated EF 55- 60%. Wall motion grossly normal, mild concentric left ventricular hypertrophy, grade I LV diastolic dysfunction, probably normal RV size and function.    At her initial visit on 10/11/2022, she reported that she felt very short of breath with minimal activity most of the time. She reports that this started about 1 year prior and had progressively gotten worse. She described PND and bendopnea. She reported that she experienced ankle swelling, worsened over the past year and usually worse towards the end of the  "night. She also reported some swelling in her abdomen. She reported daily palpations.    At her initial visit I recommended a RHC which was done 11/28/2022 and showed: Normal right sided filling pressures  Mildly elevated left sided filling pressures. Precapillary pulmonary hypertension with mean PA 36 mmHg    She was hospitalized on 3/19/2023 at [facility] for PE diagnosed on V/Q scan. She reports that she had presented with left arm pain and heaviness. She states that she was started on Apixaban.     One week after her last office visit (4/6/2023), she presented to [facility] with chest pain, epistaxis and hemoptysis. Echocardiogram showed: The left ventricular cavity size is normal with mild left ventricular hypertrophy and preserved systolic function. Estimated EF 65%. Chest CTA showed no evidence of PE; Apixaban was discontinued.     She had a repeat echocardiogram 6/2023 which showed: Normal left ventricular cavity size and mild concentric left ventricular hypertrophy. Normal systolic function. EF: 60%. Normal right ventricular structure and function. Mild tricuspid valve regurgitation with estimated RVSP: 55 mmHg. Compared to previous study from 4/14/2023, estimated right ventricular systolic pressure was higher.    I had initiated the process for her to obtain Macitentan at her last visit. She reports she is not sure if she received/started the Macitentan. She states at the time we initiated it, her Pulmonologist also started her on a new medication (Mycophenolate), and she had significant GI intolerance with this. It was trialed at various doses but ultimately stopped.     Overall she reports on-going fatigue and shortness of breath that is unchanged from last visit. She reports today was \"not a good day\". She states that she was rushing and, as a result, felt more short of breath.     She reports light-headedness and dizziness, for which she takes Meclizine. She denies syncope or near syncope. She " "denies any recent chest pain/pressure. She reports palpitations. She states she is limited with ADLs. She reports on-going headaches. She describes occasional swelling of her ankles. She reports sleeping on 2-3 pillows at night. She reports that she can wake up short of breath and coughing at times.     She states that her O2 saturations go into the [facility] system daily. She reports they have been around 93-96% and one recently dropped to 88% while she was at rest but went up to 93% shortly after.     She states her weight is between 110s-112 lbs.     ---    Rheumatology: Enzo ([facility])  Pulmonology: [facility] -> Pratibha Abbott ([facility], (f) [phone])     Past Medical / Surgical History:  Pulmonary HTN, HTN, Raynaud's Disease, Cutaneous Systemic Scleroderma (dx 1998), ILD, PE (3/2023), Follicular Carcinoma of Thyroid, Tenosynovitis, de Quervain, h/o Hernia Repair, h/o Appendicitis, h/o Digit Amputation, H/o Total Thyroidectomy (Dr. Pacheco at [facility]; 4/2013)    Family & Social History: She is , she has two children. She works as an .     Tobacco: former 2005  EtOH: never   Illicits: never     Her brother has CAD with stent  1st cousin with SLE  Both parents had \"heart problems\"    Allergies:   Allergies   Allergen Reactions   • Aspirin Shortness of breath     Other reaction(s): Unknown  \"stop breathing\"     • Ibuprofen Shortness of breath     Stop breathing   • Iodine Shortness of breath     Other reaction(s): Unknown   • Iodine And Iodide Containing Products Shortness of breath     ? rash   • Penicillins Shortness of breath     Other reaction(s): Unknown   • Sulfa (Sulfonamide Antibiotics) Angioedema   • Clindamycin    • Hydrochlorothiazide      Other reaction(s): Abdominal Pain   Slow heart rate   • Oxycodone      Other reaction(s): Vomiting   • Sulfamethoxazole-Trimethoprim      Other reaction(s): Vomiting, Weakness    • Latex Rash       Medications:   Current Outpatient Medications   Medication " Sig Dispense Refill   • amLODIPine (NORVASC) 5 mg tablet Take 10 mg by mouth every morning.     • amLODIPine (NORVASC) 5 mg tablet Take 5-10 mg by mouth nightly as needed (for high BP or Raynauds phenomenon of the fingers).     • biotin 1 mg tablet Take 1,000 mcg by mouth daily.     • cholecalciferol, vitamin D3, 1,000 unit (25 mcg) tablet Take 1,000 Units by mouth daily.     • cyanocobalamin, vitamin B-12, 1,000 mcg capsule Take 1,000 mcg by mouth daily.     • docusate sodium (COLACE) 100 mg capsule Take 100 mg by mouth daily.     • meclizine (ANTIVERT) 25 mg tablet Take 25 mg by mouth daily as needed.     • sildenafiL, pulm.hypertension, (REVATIO) 20 mg tablet Take 1 tablet (20 mg total) by mouth 3 (three) times a day. 90 tablet 3   • levothyroxine (SYNTHROID) 100 mcg tablet Take 100 mcg by mouth daily.     • mupirocin (BACTROBAN) 2 % ointment Apply 1 application. topically daily. Behind ears       No current facility-administered medications for this visit.       Review of Systems:   All other systems reviewed and are negative except as per HPI.    Physical Exam:     Wt Readings from Last 10 Encounters:   07/25/23 50.8 kg (112 lb)   07/25/23 50.8 kg (112 lb)   06/21/23 51.3 kg (113 lb)   04/19/23 51.7 kg (113 lb 14.4 oz)   04/06/23 52.6 kg (116 lb)   01/18/23 54.4 kg (120 lb)   11/28/22 55.3 kg (122 lb)   10/07/22 56.2 kg (124 lb)   08/22/22 57.2 kg (126 lb)   08/12/22 57.2 kg (126 lb)       BP Readings from Last 5 Encounters:   07/25/23 130/80   07/25/23 134/80   06/21/23 132/85   04/19/23 (!) 130/57   04/06/23 130/74       Vitals:    07/25/23 1027   BP: 130/80   Pulse: (!) 111   SpO2: (!) 91%       Body mass index is 18.08 kg/m².  Body surface area is 1.54 meters squared.    Constitutional: NAD, AAOx3  HENT: Normocephalic, atraumatic head, sclerae anicteric, no cervical lymphadenopathy, trachea midline  Cardiovascular: RRR, no murmurs, rubs or gallops, JVP: 8 cm H2O   cm H2O, no carotid bruits.  Respiratory:  CTA bilateral lung fields, no wheezes, rales or rhonchi  GI: soft, non-tender/non-distended  Musculoskeletal / Extremities: no peripheral edema, +2 pulses bilateral radial, DP/PT arteries, skin tightening on face and fingertips  Skin: no rashes  Neuro / Psych: no focal deficits, CNII-XII grossly intact, appropriate, cooperative    Diagnostic Data:     Component      Latest Ref [facility] 4/13/2023   Hemoglobin A1C      <5.7 % 4.4      Component      Latest Ref [facility] 4/13/2023 4/15/2023 4/16/2023 4/17/2023 4/18/2023   WBC      3.80 - 10.50 K/uL 7.12  10.12  9.39  8.42  6.95    Hemoglobin      11.8 - 15.7 g/dL 10.6 (L)  10.6 (L)  10.0 (L)  9.7 (L)  10.1 (L)    Hematocrit      35.0 - 45.0 % 34.3 (L)  34.4 (L)  33.4 (L)  32.5 (L)  33.0 (L)    MCV      83.0 - 98.0 fL 87.7  88.7  89.8  90.3  90.4    Platelets      150 - 369 K/uL 267  230  262  264  256       Component      Latest Ref [facility] 4/16/2023 4/17/2023   Sodium      136 - 144 mEQ/L 138  140    Potassium      3.6 - 5.1 mEQ/L 4.1  4.5    Chloride      98 - 109 mEQ/L 105  107    CO2      22 - 32 mEQ/L 25  25    BUN      8 - 20 mg/dL 27 (H)  25 (H)    Creatinine      0.6 - 1.1 mg/dL 0.9  1.0    Glucose      70 - 99 mg/dL 84  78    Calcium      8.9 - 10.3 mg/dL 9.1  9.1    eGFR      >=60.0 mL/min/1.73m*2 >60.0  >60.0    Anion Gap      3 - 15 mEQ/L 8  8       Component      Latest Ref [facility] 4/14/2023   Triglycerides      30 - 149 mg/dL 44    Cholesterol      <=200 mg/dL 194    HDL      >=55 mg/dL 49 (L)    LDL Calculated      <=100 mg/dL 136 (H)    Non-HDL, Calculated      mg/dL 145    RISK      <=5.0  4.0       Component      Latest Ref [facility] 4/13/2023   High Sens Troponin I      <15.0 pg/mL 23.0 (H)       Component      Latest Ref Rng 4/14/2023   Ferritin      11 - 250 ng/mL 75      Component      Latest Ref Rng 4/13/2023   TSH      0.34 - 5.60 mIU/L 4.71      Component      Latest Ref Rng 4/14/2023   Iron      35 - 150 ug/dL 29 (L)    TIBC      270 - 460 ug/dL 245 (L)     UIBC      180 - 360 ug/dL 216    Iron Saturation      15 - 45 % 12 (L)        Component      Latest Ref Rng 6/27/2022 4/13/2023   BNP      <=100 pg/mL 111 (H)  105 (H)                                     ---    ECG (7/25/2023, personally reviewed): Sinus Tachycardia (HR: 111 bpm), old anterior infarct pattern, non-specific ST/T-wave changes    ---    6MWT (7/25/2023, on Sildenafil 20 mg TID):        TTE (6/21/2023, personally reviewed):  1. Normal left ventricular cavity size and mild concentric left ventricular hypertrophy. Normal systolic function. EF: 60%. No regional wall motion abnormalities. Grade I diastolic dysfunction.   2. Aortic valve cusps not well visualized. Trace aortic regurgitation. Aortic valve and root sclerosis.  3. Thickened mitral valve leaflets. Mild mitral annular calcification. Trace mitral regurgitation. Mildly dilated left atrial size.   4. Normal right ventricular structure and function. Mild tricuspid valve regurgitation with estimated RVSP: 55 mmHg (assuming right atrial pressure of 3 mmHg). Normal right atrial size. Pulmonic valve not well visualized. Trace pulmonic valve regurgitation. Pulsed wave Doppler of the RVOT systolic profile show decreased acceleration times ( msec) and late systolic notching consistent with elevated PVR.   5. IVC size is normal with > 50% inspiratory collapse consistent with normal right atrial pressure. Small pericardial effusion without echocardiographic evidence of tamponade.  6. Compared to previous study from 4/14/2023, estimated right ventricular systolic pressure is higher.        TTE (4/14/2023, personally reviewed):   • The left ventricular cavity size is normal with mild left ventricular hypertrophy and preserved systolic function. Estimated EF 65%. No regional wall motion abnormalities. Grade I diastolic dysfunction.     • The aortic valve is tricuspid. Aortic valve sclerosis. Trace aortic regurgitation.      • The visualized portions of  the aortic root and ascending aorta are normal in size.     • The mitral valve is mildly thickened. Mild mitral annular calcification. Trace mitral regurgitation.       • The left atrium is severely dilated.      • The right ventricle is normal in size with preserved systolic function     • The pulmonic valve is not well seen.     • The tricuspid valve is normal in appearance. mild tricuspid regurgitation with an estimated RVSP of 34 mmHg.       • Right atrium is normal in size.     • The IVC is small and collapses > 50% with inspiration consistent with normal right atrial pressures.     • Small pericardial effusion, mostly posterior.       • Compared to previous echocardiogram from 8/22/2022, there is no significant change        TTE (11/28/2022, personally reviewed):   • Normal right- and mildly elevated left-sided filling pressures (RA: 3 mmHg, PCWP: 13 mmHg)  • Elevated pulmonary artery pressures (60/22(35 mmHg)) in the setting of PCWP: 13 mmHg.   • Normal cardiac output and index (CO: 5.1 L/min, CI: 3.2 L/min/m2)  • PVR: 4.3 Wood units. SVR: 1840 dynes/sec/cm5      TTE (8/22/2022, personally reviewed):  · Normal-sized LV. Normal LV systolic function. Estimated EF 55- 60%. Wall motion appears grossly normal. Mild concentric left ventricular hypertrophy. Grade I LV diastolic dysfunction.  · Pulmonic valve not well visualized. Grossly normal pulmonic valve structure. Trace pulmonic valve regurgitation. No pulmonic valve stenosis.  · Aortic valve not well visualized. Aortic valve not well seen but likely trileaflet. Focal aortic valve calcification present. Sclerotic aortic valve leaflets. Mild aortic valve regurgitation. No aortic valve stenosis.  · RV not well visualized. Normal-sized RV. Normal RV systolic function.  · Normal-sized RA.  · There is a small loculated pericardial effusion anterior to the right atrium. No cardiac tamponade.  · Sclerotic mitral valve. Mitral valve calcification of the anterior  leaflet present.  · Trace mitral valve regurgitation.  · No significant mitral valve stenosis.  · Normal-sized IVC. IVC demonstrates normal respiratory collapse.  · Tricuspid valve structure is grossly normal. Mild to moderate tricuspid valve regurgitation.  · Estimated RVSP = 50-55 mmHg.  · Mildly dilated LA.  · No previous echocardiogram available for comparison.     TTE (4/21/2022, outside study):  This result has an attachment that is not available.   •  A complete transthoracic echocardiogram (including 2D, color flow   Doppler, spectral Doppler and M-mode imaging) was performed using the   standard protocol. The study quality was adequate.   •  The left ventricle is normal in size. There is moderately increased   wall thickness consistent with moderate concentric hypertrophy.   Endocardium is incompletely visualized, but no regional wall motion   abnormalities are noted in the visualized segments. Normal left   ventricular ejection fraction. The left ventricular ejection fraction by   visual estimate is 55% to 60%.   •  The right ventricle is normal in size. There is normal function of the   right ventricle.   •  The left atrium is normal in size. Left atrial volume is 58 mL.   •  The right atrial volume measures 52 mL. The right atrial volume index   measures 34 mL/m2.   •  There is trace mitral regurgitation. There is no mitral stenosis.   •  The aortic valve is trileaflet. There is mild aortic valve thickening.   There is no aortic stenosis. There is trace aortic regurgitation.   •  There is mild to moderate tricuspid regurgitation. Pulmonary artery   systolic pressure measures 52 mmHg. The pulmonary artery systolic pressure   is moderately elevated.   •  The aorta measures 3.2 cm at the sinus of Valsalva. The proximal   segment of the ascending aorta is mildly enlarged. The proximal segment of   the ascending aorta measures 3.4 cm. The proximal segment of the ascending   aorta measures 2.2 cm/m2, indexed  for body surface area.   •  Trivial pericardial effusion present. There is no echocardiographic   evidence of cardiac tamponade.   •  The inferior vena cava is normal in size (diameter <21 mm) and   decreases >50% in size with inspiration, suggesting a normal right atrial   pressure of 3 mmHg (range 0-5 mm Hg).   •  Compared to the prior study of 3/13/2020, there are no significant   changes.        PFT (3/23/2022):      PFT (7/14/2021):      CT Chest (5/2/2021, outside study):  CLINICAL INFORMATION: Systemic sclerosis. Interstitial lung disease. Groundglass nodule     PROCEDURE: Unenhanced CT of the chest was performed using thin section reconstructions. Images were obtained on inspiration and expiration.     COMPARISON: June and August 2020     FINDINGS:     LUNGS, PLEURA:     Groundglass opacities with reticulation with subpleural sparing in the lower lobes, without honeycombing or architectural distortion, unchanged.     Right upper lobe groundglass nodule, image 114 of series 3, 8 x 11 mm, previously 6 mm.     Expiratory images are of diagnostic quality and do not demonstrate evidence of clinically significant air trapping.     CARDIOVASCULAR, MEDIASTINUM, THYROID: Coronary calcifications. Trace pericardial effusion.     LYMPH NODES: Subcentimeter mediastinal lymph nodes, unchanged. Unchanged axillary lymph nodes.     SKELETON, CHEST WALL: No destructive bone lesion     UPPER ABDOMEN: Patulous esophagus. 3 mm right renal stone.       RHC (9/02/2020):  RA   8 mmHg   RV   40/5 mmHg   PA (mean)  44/16 (25) mmHg   PCWP   12 mmHg   CO   4.65 lpm (Thermodilution)   CI   2.65 lpm/m-squared   SVI    33 ml/beat/m-squared (lower limit normal 29)   PVR   2.8 mmHg/l/min (Wood units)   SVR   2064 dyne-sec-cm-5         PFT (3/18/2020):      TTE (3/13/2020, outside study):  · The study quality was good.   · The left ventricle is normal in size. Top normal left ventricular wall   thickness. There are no segmental wall motion  abnormalities. The left   ventricular ejection fraction is 55-60% by visual estimate and 3D   echocardiography. Normal left ventricular ejection fraction.   · There is grade I (mild) LV diastolic dysfunction.   · The right ventricle is normal in size. There is normal function of the   right ventricle.   · The right atrium is mildly dilated.   · There is mild mitral valve anterior leaflet thickening. There is mild   mitral valve leaflet calcification. There is flattening of the mitral   leaflets without raphael prolapse. There is trace mitral regurgitation.   · The aortic valve is trileaflet. There is mild thickening of the aortic   valve. There is mild calcification of the aortic valve. There is no aortic   /stenosis. There is trace aortic regurgitation.   · There is mild tricuspid valve leaflet thickening. There is mild to   moderate tricuspid regurgitation. The pulmonary artery systolic pressure   is moderately elevated. Pulmonary artery systolic pressure measures 50   mmHg. There is mid-systolic notching of the RVOT VTI consistent with   elevated pulmonary vascular resistance.   · The inferior vena cava is normal in size (diameter <21 mm) and decreases   >50% in size with inspiration, suggesting a normal right atrial pressure   of 3 mmHg (range 0-5 mm Hg).   · Trivial loculated pericardial effusion present adjacent to the right   ventricle and adjacent to the right atrium.          Assessment / Plan:     1. Pulmonary Hypertension (WHO Group I, NYHA/WHO FC III-IV): Occurring in the background of Systemic Scleroderma with ILD. August 2022 TTE showed normal RV size and function, but progressive exertional decline, worsened DLCO and resting tachycardia suggest there may be progression of her pulmonary vascular disease and limitation of cardiac output. This was proven with 11/2022 RHC showing mean PA 35 mmHg (with PCWP 13 mmHg) and PVR 4.3 Wood units. June 2023 TTE showed increase in RVSP.     She is euvolemic on exam  "today. She will continue Sildenafil 20 mg TID, which she has tolerated well. At her last office visit, I initiated the approval process for Macitentan 10 mg daily for combination therapy. She is unsure if she received or started this as Pulmonology was also making medication changes. I have asked her to check if she has this medication at home and to let us know if she does not. If she does have it, I have asked her to initiate this and call if there is any decrease in O2 saturation or appearance of leg swelling.     6-minute walk test today showed significant functional impairment.     2. HTN: Borderline controlled. She is continued on Amlodipine.     3. Systemic Scleroderma / Raynaud's Disease: Per Rheumatology (Dr. Giraldo).      4. ILD: Per Pulmonology (Dr. Pratibha Abbott) and Rheumatology (Dr. Giraldo). She has not tolerated trials of Mycophenolate.    5. PE: Diagnosed at outside hospital by V/Q scan. CTA Chest did not show evidence for PE. Apixaban has been discontinued.     Thank you for allowing me to participate in the care of your patient. I would like to see her in the office in 3 months.     Please do not hesitate to contact me with any questions or issues.     Sincerely,     Timothy Arvizu, DO  Cardiology / Heart Failure / Pulmonary Hypertension    [email or web address]    [phone]    [facility]  [facility]  [address]   [place] [id number]      Counseling and coordination of care consisted of more than 50% of this complex medical evaluation; numerous diagnostic and management options were considered and testing results were reviewed and discussed with the patient as best as able, during this encounter; the patient has multiple medical problems and is on numerous medications; the patient has a high risk of complications due to their cardiac disease, and all these resulted in the assigned \"complex medical decision-making\" characterization and coding of this patient encounter. "  I spent 42 minutes on this date of service performing the following activities: obtaining history, performing examination, entering orders, documenting, preparing for visit, obtaining / reviewing records, providing counseling and education and independently reviewing study/studies.

## 2023-07-25 ENCOUNTER — OFFICE VISIT (OUTPATIENT)
Dept: CARDIOLOGY | Facility: CLINIC | Age: 63
End: 2023-07-25
Attending: INTERNAL MEDICINE
Payer: COMMERCIAL

## 2023-07-25 ENCOUNTER — OFFICE VISIT (OUTPATIENT)
Dept: CARDIOLOGY | Facility: CLINIC | Age: 63
End: 2023-07-25
Payer: COMMERCIAL

## 2023-07-25 VITALS
BODY MASS INDEX: 18 KG/M2 | OXYGEN SATURATION: 91 % | HEIGHT: 66 IN | HEART RATE: 111 BPM | SYSTOLIC BLOOD PRESSURE: 130 MMHG | WEIGHT: 112 LBS | DIASTOLIC BLOOD PRESSURE: 80 MMHG

## 2023-07-25 VITALS
SYSTOLIC BLOOD PRESSURE: 134 MMHG | DIASTOLIC BLOOD PRESSURE: 80 MMHG | WEIGHT: 112 LBS | OXYGEN SATURATION: 96 % | BODY MASS INDEX: 18 KG/M2 | HEART RATE: 107 BPM | HEIGHT: 66 IN

## 2023-07-25 DIAGNOSIS — E78.49 OTHER HYPERLIPIDEMIA: Primary | ICD-10-CM

## 2023-07-25 DIAGNOSIS — I10 ESSENTIAL (PRIMARY) HYPERTENSION: ICD-10-CM

## 2023-07-25 DIAGNOSIS — I51.9 RIGHT VENTRICULAR DYSFUNCTION: ICD-10-CM

## 2023-07-25 DIAGNOSIS — J84.9 INTERSTITIAL LUNG DISEASE (CMS/HCC): ICD-10-CM

## 2023-07-25 DIAGNOSIS — I27.20 PULMONARY HYPERTENSION (CMS/HCC): ICD-10-CM

## 2023-07-25 DIAGNOSIS — I73.01 RAYNAUD'S DISEASE WITH GANGRENE (CMS/HCC): ICD-10-CM

## 2023-07-25 DIAGNOSIS — M34.9 SCLERODERMA (CMS/HCC): ICD-10-CM

## 2023-07-25 PROCEDURE — 99215 OFFICE O/P EST HI 40 MIN: CPT | Performed by: INTERNAL MEDICINE

## 2023-07-25 PROCEDURE — 3075F SYST BP GE 130 - 139MM HG: CPT | Performed by: INTERNAL MEDICINE

## 2023-07-25 PROCEDURE — 93000 ELECTROCARDIOGRAM COMPLETE: CPT | Performed by: INTERNAL MEDICINE

## 2023-07-25 PROCEDURE — 3008F BODY MASS INDEX DOCD: CPT | Performed by: INTERNAL MEDICINE

## 2023-07-25 PROCEDURE — 99999 PR OFFICE/OUTPT VISIT,PROCEDURE ONLY: CPT | Performed by: INTERNAL MEDICINE

## 2023-07-25 PROCEDURE — 3079F DIAST BP 80-89 MM HG: CPT | Performed by: INTERNAL MEDICINE

## 2023-07-25 NOTE — PATIENT INSTRUCTIONS
-Check at home if you have the medication Macitentan (Opsumit) 10 mg daily, call us.   -Call if you have increased swelling, increased shortness of breath and quick weight gain 2 lbs overnight or 5 lbs in one week.   -Limit dietary sodium to 2000 mg daily and fluid intake to 48-64 ounces.   -Call with any questions or issues: [phone]  -Follow up in 3 months

## 2023-07-25 NOTE — LETTER
2023     Sara Simmons MD  [address] [facility] [address] [place] [id number]    Patient: Arielle Felix  YOB: 1960  Date of Visit: 2023      Dear Dr. Simmons:    Thank you for referring Arielle Felix to me for evaluation. Below are my notes for this consultation.    If you have questions, please do not hesitate to call me. I look forward to following your patient along with you.         Sincerely,        Timothy Arvizu,         CC: MD Dr. Pratibha Tao John, DO  2023  1:38 PM  Addendum  I had the pleasure of seeing Arielle Felix (: 1960) in the [facility] Heart Failure and Pulmonary Hypertension clinic on 2023. She presents today with her care giver, Sara.     Ms. Felix is a 62 y.o. female with a past medical history of Pulmonary HTN, HTN, Raynaud's Disease, Cutaneous Systemic Scleroderma (dx ), ILD, PE (3/2023). She is a patient of Dr. Nguyễn. She was previously followed by Dr. Jos Valdez at [facility]. She had stress test 2013 was negative for ischemia. Echocardiogram from 2022 showed LVEF: 55-60%, mild aortic valve thickening, pulmonary artery systolic pressure: 52 mmHg and trivial pericardial effusion. LHC and RHC (separate occasions) over the last 5-10 years which showed normal hemodynamics and normal coronaries. She had a RHC 2020 showing top-normal right sided filling pressures with mild pulmonary hypertension, top-normal PVR and normal pulmonary capillary wedge pressure. The cardiac index was normal, seen below.     On 2022, [place] [facility] [place]. EKG: NSR without ischemic changes. hsTrop: 12->16, d-dimer: 0.56, CXR showed cardiomegaly and diffuse interstitial prominence. She was discharged home and advised to follow up with cardiology.     Dr. Nguyễn ordered echocardiogram, which was completed on 2022 and showed estimated RVSP = 50-55 mmHg, normal-sized LV  with normal LV systolic function. Estimated EF 55- 60%. Wall motion grossly normal, mild concentric left ventricular hypertrophy, grade I LV diastolic dysfunction, probably normal RV size and function.    At her initial visit on 10/11/2022, she reported that she felt very short of breath with minimal activity most of the time. She reports that this started about 1 year prior and had progressively gotten worse. She described PND and bendopnea. She reported that she experienced ankle swelling, worsened over the past year and usually worse towards the end of the night. She also reported some swelling in her abdomen. She reported daily palpations.    At her initial visit I recommended a RHC which was done 11/28/2022 and showed: Normal right sided filling pressures  Mildly elevated left sided filling pressures. Precapillary pulmonary hypertension with mean PA 36 mmHg    She was hospitalized on 3/19/2023 at [facility] for PE diagnosed on V/Q scan. She reports that she had presented with left arm pain and heaviness. She states that she was started on Apixaban.     One week after her last office visit (4/6/2023), [place] [facility] [place], epistaxis and hemoptysis. Echocardiogram showed: The left ventricular cavity size is normal with mild left ventricular hypertrophy and preserved systolic function. Estimated EF 65%. Chest CTA showed no evidence of PE; Apixaban was discontinued.     She had a repeat echocardiogram 6/2023 which showed: Normal left ventricular cavity size and mild concentric left ventricular hypertrophy. Normal systolic function. EF: 60%. Normal right ventricular structure and function. Mild tricuspid valve regurgitation with estimated RVSP: 55 mmHg. Compared to previous study from 4/14/2023, estimated right ventricular systolic pressure was higher.    I had initiated the process for her to obtain Macitentan at her last visit. She reports she is not sure if she received/started the  "Macitentan. She states at the time we initiated it, her Pulmonologist also started her on a new medication (Mycophenolate), and she had significant GI intolerance with this. It was trialed at various doses but ultimately stopped.     Overall she reports on-going fatigue and shortness of breath that is unchanged from last visit. She reports today was \"not a good day\". She states that she was rushing and, as a result, felt more short of breath.     She reports light-headedness and dizziness, for which she takes Meclizine. She denies syncope or near syncope. She denies any recent chest pain/pressure. She reports palpitations. She states she is limited with ADLs. She reports on-going headaches. She describes occasional swelling of her ankles. She reports sleeping on 2-3 pillows at night. She reports that she can wake up short of breath and coughing at times.     She states that her O2 saturations go into the [facility] system daily. She reports they have been around 93-96% and one recently dropped to 88% while she was at rest but went up to 93% shortly after.     She states her weight is between 110s-112 lbs.     ---    Rheumatology: Enzo ([facility])  Pulmonology: [facility] -> Pratibha Abbott ([facility], (f) [phone])     Past Medical / Surgical History:  Pulmonary HTN, HTN, Raynaud's Disease, Cutaneous Systemic Scleroderma (dx 1998), ILD, PE (3/2023), Follicular Carcinoma of Thyroid, Tenosynovitis, de Quervain, h/o Hernia Repair, h/o Appendicitis, h/o Digit Amputation, H/o Total Thyroidectomy (Dr. Pacheco at [facility]; 4/2013)    Family & Social History: She is , she has two children. She works as an .     Tobacco: former 2005  EtOH: never   Illicits: never     Her brother has CAD with stent  1st cousin with SLE  Both parents had \"heart problems\"    Allergies:   Allergies   Allergen Reactions   • Aspirin Shortness of breath     Other reaction(s): Unknown  \"stop breathing\"     • Ibuprofen Shortness of breath     " Stop breathing   • Iodine Shortness of breath     Other reaction(s): Unknown   • Iodine And Iodide Containing Products Shortness of breath     ? rash   • Penicillins Shortness of breath     Other reaction(s): Unknown   • Sulfa (Sulfonamide Antibiotics) Angioedema   • Clindamycin    • Hydrochlorothiazide      Other reaction(s): Abdominal Pain   Slow heart rate   • Oxycodone      Other reaction(s): Vomiting   • Sulfamethoxazole-Trimethoprim      Other reaction(s): Vomiting, Weakness    • Latex Rash       Medications:   Current Outpatient Medications   Medication Sig Dispense Refill   • amLODIPine (NORVASC) 5 mg tablet Take 10 mg by mouth every morning.     • amLODIPine (NORVASC) 5 mg tablet Take 5-10 mg by mouth nightly as needed (for high BP or Raynauds phenomenon of the fingers).     • biotin 1 mg tablet Take 1,000 mcg by mouth daily.     • cholecalciferol, vitamin D3, 1,000 unit (25 mcg) tablet Take 1,000 Units by mouth daily.     • cyanocobalamin, vitamin B-12, 1,000 mcg capsule Take 1,000 mcg by mouth daily.     • docusate sodium (COLACE) 100 mg capsule Take 100 mg by mouth daily.     • meclizine (ANTIVERT) 25 mg tablet Take 25 mg by mouth daily as needed.     • sildenafiL, pulm.hypertension, (REVATIO) 20 mg tablet Take 1 tablet (20 mg total) by mouth 3 (three) times a day. 90 tablet 3   • levothyroxine (SYNTHROID) 100 mcg tablet Take 100 mcg by mouth daily.     • mupirocin (BACTROBAN) 2 % ointment Apply 1 application. topically daily. Behind ears       No current facility-administered medications for this visit.       Review of Systems:   All other systems reviewed and are negative except as per HPI.    Physical Exam:     Wt Readings from Last 10 Encounters:   07/25/23 50.8 kg (112 lb)   07/25/23 50.8 kg (112 lb)   06/21/23 51.3 kg (113 lb)   04/19/23 51.7 kg (113 lb 14.4 oz)   04/06/23 52.6 kg (116 lb)   01/18/23 54.4 kg (120 lb)   11/28/22 55.3 kg (122 lb)   10/07/22 56.2 kg (124 lb)   08/22/22 57.2 kg (126  lb)   08/12/22 57.2 kg (126 lb)       BP Readings from Last 5 Encounters:   07/25/23 130/80   07/25/23 134/80   06/21/23 132/85   04/19/23 (!) 130/57   04/06/23 130/74       Vitals:    07/25/23 1027   BP: 130/80   Pulse: (!) 111   SpO2: (!) 91%       Body mass index is 18.08 kg/m².  Body surface area is 1.54 meters squared.    Constitutional: NAD, AAOx3  HENT: Normocephalic, atraumatic head, sclerae anicteric, no cervical lymphadenopathy, trachea midline  Cardiovascular: RRR, no murmurs, rubs or gallops, JVP: 8 cm H2O   cm H2O, no carotid bruits.  Respiratory: CTA bilateral lung fields, no wheezes, rales or rhonchi  GI: soft, non-tender/non-distended  Musculoskeletal / Extremities: no peripheral edema, +2 pulses bilateral radial, DP/PT arteries, skin tightening on face and fingertips  Skin: no rashes  Neuro / Psych: no focal deficits, CNII-XII grossly intact, appropriate, cooperative    Diagnostic Data:     Component      Latest Ref Rng 4/13/2023   Hemoglobin A1C      <5.7 % 4.4      Component      Latest Ref Rng 4/13/2023 4/15/2023 4/16/2023 4/17/2023 4/18/2023   WBC      3.80 - 10.50 K/uL 7.12  10.12  9.39  8.42  6.95    Hemoglobin      11.8 - 15.7 g/dL 10.6 (L)  10.6 (L)  10.0 (L)  9.7 (L)  10.1 (L)    Hematocrit      35.0 - 45.0 % 34.3 (L)  34.4 (L)  33.4 (L)  32.5 (L)  33.0 (L)    MCV      83.0 - 98.0 fL 87.7  88.7  89.8  90.3  90.4    Platelets      150 - 369 K/uL 267  230  262  264  256       Component      Latest Ref Rng 4/16/2023 4/17/2023   Sodium      136 - 144 mEQ/L 138  140    Potassium      3.6 - 5.1 mEQ/L 4.1  4.5    Chloride      98 - 109 mEQ/L 105  107    CO2      22 - 32 mEQ/L 25  25    BUN      8 - 20 mg/dL 27 (H)  25 (H)    Creatinine      0.6 - 1.1 mg/dL 0.9  1.0    Glucose      70 - 99 mg/dL 84  78    Calcium      8.9 - 10.3 mg/dL 9.1  9.1    eGFR      >=60.0 mL/min/1.73m*2 >60.0  >60.0    Anion Gap      3 - 15 mEQ/L 8  8       Component      Latest Ref Rng 4/14/2023   Triglycerides      30 -  149 mg/dL 44    Cholesterol      <=200 mg/dL 194    HDL      >=55 mg/dL 49 (L)    LDL Calculated      <=100 mg/dL 136 (H)    Non-HDL, Calculated      mg/dL 145    RISK      <=5.0  4.0       Component      Latest Ref Rng 4/13/2023   High Sens Troponin I      <15.0 pg/mL 23.0 (H)       Component      Latest Ref Rng 4/14/2023   Ferritin      11 - 250 ng/mL 75      Component      Latest Ref Rng 4/13/2023   TSH      0.34 - 5.60 mIU/L 4.71      Component      Latest Ref Rng 4/14/2023   Iron      35 - 150 ug/dL 29 (L)    TIBC      270 - 460 ug/dL 245 (L)    UIBC      180 - 360 ug/dL 216    Iron Saturation      15 - 45 % 12 (L)        Component      Latest Ref Rng 6/27/2022 4/13/2023   BNP      <=100 pg/mL 111 (H)  105 (H)                                     ---    ECG (7/25/2023, personally reviewed): Sinus Tachycardia (HR: 111 bpm), old anterior infarct pattern, non-specific ST/T-wave changes    ---    6MWT (7/25/2023, on Sildenafil 20 mg TID):        TTE (6/21/2023, personally reviewed):  1. Normal left ventricular cavity size and mild concentric left ventricular hypertrophy. Normal systolic function. EF: 60%. No regional wall motion abnormalities. Grade I diastolic dysfunction.   2. Aortic valve cusps not well visualized. Trace aortic regurgitation. Aortic valve and root sclerosis.  3. Thickened mitral valve leaflets. Mild mitral annular calcification. Trace mitral regurgitation. Mildly dilated left atrial size.   4. Normal right ventricular structure and function. Mild tricuspid valve regurgitation with estimated RVSP: 55 mmHg (assuming right atrial pressure of 3 mmHg). Normal right atrial size. Pulmonic valve not well visualized. Trace pulmonic valve regurgitation. Pulsed wave Doppler of the RVOT systolic profile show decreased acceleration times ( msec) and late systolic notching consistent with elevated PVR.   5. IVC size is normal with > 50% inspiratory collapse consistent with normal right atrial pressure.  Small pericardial effusion without echocardiographic evidence of tamponade.  6. Compared to previous study from 4/14/2023, estimated right ventricular systolic pressure is higher.        TTE (4/14/2023, personally reviewed):   • The left ventricular cavity size is normal with mild left ventricular hypertrophy and preserved systolic function. Estimated EF 65%. No regional wall motion abnormalities. Grade I diastolic dysfunction.     • The aortic valve is tricuspid. Aortic valve sclerosis. Trace aortic regurgitation.      • The visualized portions of the aortic root and ascending aorta are normal in size.     • The mitral valve is mildly thickened. Mild mitral annular calcification. Trace mitral regurgitation.       • The left atrium is severely dilated.      • The right ventricle is normal in size with preserved systolic function     • The pulmonic valve is not well seen.     • The tricuspid valve is normal in appearance. mild tricuspid regurgitation with an estimated RVSP of 34 mmHg.       • Right atrium is normal in size.     • The IVC is small and collapses > 50% with inspiration consistent with normal right atrial pressures.     • Small pericardial effusion, mostly posterior.       • Compared to previous echocardiogram from 8/22/2022, there is no significant change        TTE (11/28/2022, personally reviewed):   • Normal right- and mildly elevated left-sided filling pressures (RA: 3 mmHg, PCWP: 13 mmHg)  • Elevated pulmonary artery pressures (60/22(35 mmHg)) in the setting of PCWP: 13 mmHg.   • Normal cardiac output and index (CO: 5.1 L/min, CI: 3.2 L/min/m2)  • PVR: 4.3 Wood units. SVR: 1840 dynes/sec/cm5      TTE (8/22/2022, personally reviewed):  · Normal-sized LV. Normal LV systolic function. Estimated EF 55- 60%. Wall motion appears grossly normal. Mild concentric left ventricular hypertrophy. Grade I LV diastolic dysfunction.  · Pulmonic valve not well visualized. Grossly normal pulmonic valve structure.  Trace pulmonic valve regurgitation. No pulmonic valve stenosis.  · Aortic valve not well visualized. Aortic valve not well seen but likely trileaflet. Focal aortic valve calcification present. Sclerotic aortic valve leaflets. Mild aortic valve regurgitation. No aortic valve stenosis.  · RV not well visualized. Normal-sized RV. Normal RV systolic function.  · Normal-sized RA.  · There is a small loculated pericardial effusion anterior to the right atrium. No cardiac tamponade.  · Sclerotic mitral valve. Mitral valve calcification of the anterior leaflet present.  · Trace mitral valve regurgitation.  · No significant mitral valve stenosis.  · Normal-sized IVC. IVC demonstrates normal respiratory collapse.  · Tricuspid valve structure is grossly normal. Mild to moderate tricuspid valve regurgitation.  · Estimated RVSP = 50-55 mmHg.  · Mildly dilated LA.  · No previous echocardiogram available for comparison.     TTE (4/21/2022, outside study):  This result has an attachment that is not available.   •  A complete transthoracic echocardiogram (including 2D, color flow   Doppler, spectral Doppler and M-mode imaging) was performed using the   standard protocol. The study quality was adequate.   •  The left ventricle is normal in size. There is moderately increased   wall thickness consistent with moderate concentric hypertrophy.   Endocardium is incompletely visualized, but no regional wall motion   abnormalities are noted in the visualized segments. Normal left   ventricular ejection fraction. The left ventricular ejection fraction by   visual estimate is 55% to 60%.   •  The right ventricle is normal in size. There is normal function of the   right ventricle.   •  The left atrium is normal in size. Left atrial volume is 58 mL.   •  The right atrial volume measures 52 mL. The right atrial volume index   measures 34 mL/m2.   •  There is trace mitral regurgitation. There is no mitral stenosis.   •  The aortic valve is  trileaflet. There is mild aortic valve thickening.   There is no aortic stenosis. There is trace aortic regurgitation.   •  There is mild to moderate tricuspid regurgitation. Pulmonary artery   systolic pressure measures 52 mmHg. The pulmonary artery systolic pressure   is moderately elevated.   •  The aorta measures 3.2 cm at the sinus of Valsalva. The proximal   segment of the ascending aorta is mildly enlarged. The proximal segment of   the ascending aorta measures 3.4 cm. The proximal segment of the ascending   aorta measures 2.2 cm/m2, indexed for body surface area.   •  Trivial pericardial effusion present. There is no echocardiographic   evidence of cardiac tamponade.   •  The inferior vena cava is normal in size (diameter <21 mm) and   decreases >50% in size with inspiration, suggesting a normal right atrial   pressure of 3 mmHg (range 0-5 mm Hg).   •  Compared to the prior study of 3/13/2020, there are no significant   changes.        PFT (3/23/2022):      PFT (7/14/2021):      CT Chest (5/2/2021, outside study):  CLINICAL INFORMATION: Systemic sclerosis. Interstitial lung disease. Groundglass nodule     PROCEDURE: Unenhanced CT of the chest was performed using thin section reconstructions. Images were obtained on inspiration and expiration.     COMPARISON: June and August 2020     FINDINGS:     LUNGS, PLEURA:     Groundglass opacities with reticulation with subpleural sparing in the lower lobes, without honeycombing or architectural distortion, unchanged.     Right upper lobe groundglass nodule, image 114 of series 3, 8 x 11 mm, previously 6 mm.     Expiratory images are of diagnostic quality and do not demonstrate evidence of clinically significant air trapping.     CARDIOVASCULAR, MEDIASTINUM, THYROID: Coronary calcifications. Trace pericardial effusion.     LYMPH NODES: Subcentimeter mediastinal lymph nodes, unchanged. Unchanged axillary lymph nodes.     SKELETON, CHEST WALL: No destructive bone lesion      UPPER ABDOMEN: Patulous esophagus. 3 mm right renal stone.       RHC (9/02/2020):  RA   8 mmHg   RV   40/5 mmHg   PA (mean)  44/16 (25) mmHg   PCWP   12 mmHg   CO   4.65 lpm (Thermodilution)   CI   2.65 lpm/m-squared   SVI    33 ml/beat/m-squared (lower limit normal 29)   PVR   2.8 mmHg/l/min (Wood units)   SVR   2064 dyne-sec-cm-5         PFT (3/18/2020):      TTE (3/13/2020, outside study):  · The study quality was good.   · The left ventricle is normal in size. Top normal left ventricular wall   thickness. There are no segmental wall motion abnormalities. The left   ventricular ejection fraction is 55-60% by visual estimate and 3D   echocardiography. Normal left ventricular ejection fraction.   · There is grade I (mild) LV diastolic dysfunction.   · The right ventricle is normal in size. There is normal function of the   right ventricle.   · The right atrium is mildly dilated.   · There is mild mitral valve anterior leaflet thickening. There is mild   mitral valve leaflet calcification. There is flattening of the mitral   leaflets without raphael prolapse. There is trace mitral regurgitation.   · The aortic valve is trileaflet. There is mild thickening of the aortic   valve. There is mild calcification of the aortic valve. There is no aortic   /stenosis. There is trace aortic regurgitation.   · There is mild tricuspid valve leaflet thickening. There is mild to   moderate tricuspid regurgitation. The pulmonary artery systolic pressure   is moderately elevated. Pulmonary artery systolic pressure measures 50   mmHg. There is mid-systolic notching of the RVOT VTI consistent with   elevated pulmonary vascular resistance.   · The inferior vena cava is normal in size (diameter <21 mm) and decreases   >50% in size with inspiration, suggesting a normal right atrial pressure   of 3 mmHg (range 0-5 mm Hg).   · Trivial loculated pericardial effusion present adjacent to the right   ventricle and adjacent to the right  atrium.          Assessment / Plan:     1. Pulmonary Hypertension (WHO Group I, NYHA/WHO FC III-IV): Occurring in the background of Systemic Scleroderma with ILD. August 2022 TTE showed normal RV size and function, but progressive exertional decline, worsened DLCO and resting tachycardia suggest there may be progression of her pulmonary vascular disease and limitation of cardiac output. This was proven with 11/2022 RHC showing mean PA 35 mmHg (with PCWP 13 mmHg) and PVR 4.3 Wood units. June 2023 TTE showed increase in RVSP.     She is euvolemic on exam today. She will continue Sildenafil 20 mg TID, which she has tolerated well. At her last office visit, I initiated the approval process for Macitentan 10 mg daily for combination therapy. She is unsure if she received or started this as Pulmonology was also making medication changes. I have asked her to check if she has this medication at home and to let us know if she does not. If she does have it, I have asked her to initiate this and call if there is any decrease in O2 saturation or appearance of leg swelling.     6-minute walk test today showed significant functional impairment.     2. HTN: Borderline controlled. She is continued on Amlodipine.     3. Systemic Scleroderma / Raynaud's Disease: Per Rheumatology (Dr. Giraldo).      4. ILD: Per Pulmonology (Dr. Pratibha Abbott) and Rheumatology (Dr. Giraldo). She has not tolerated trials of Mycophenolate.    5. PE: Diagnosed at outside hospital by V/Q scan. CTA Chest did not show evidence for PE. Apixaban has been discontinued.     Thank you for allowing me to participate in the care of your patient. I would like to see her in the office in 3 months.     Please do not hesitate to contact me with any questions or issues.     Sincerely,     Timothy Arvizu, DO  Cardiology / Heart Failure / Pulmonary Hypertension    [email or web address]    [phone]    [facility]  [facility]  [address]  "  [place] [address]      Counseling and coordination of care consisted of more than 50% of this complex medical evaluation; numerous diagnostic and management options were considered and testing results were reviewed and discussed with the patient as best as able, during this encounter; the patient has multiple medical problems and is on numerous medications; the patient has a high risk of complications due to their cardiac disease, and all these resulted in the assigned \"complex medical decision-making\" characterization and coding of this patient encounter.  I spent 42 minutes on this date of service performing the following activities: obtaining history, performing examination, entering orders, documenting, preparing for visit, obtaining / reviewing records, providing counseling and education and independently reviewing study/studies.    "

## 2023-07-25 NOTE — PROGRESS NOTES
6 minute walk    Please see flowsheet    Patient was unable to finish. Had one minute left. Completed one full lap and had to stop for one minute and sit. When the patient resumed, she had to stop completely  senior care through the second lap due to becoming dizzy/weak/sob.

## 2023-07-26 ENCOUNTER — TELEPHONE (OUTPATIENT)
Dept: SCHEDULING | Facility: CLINIC | Age: 63
End: 2023-07-26
Payer: COMMERCIAL

## 2023-07-26 NOTE — TELEPHONE ENCOUNTER
Patient called stated she spoke with Dr Arvizu yesterday and was told to go home and call back to confirm she has medication Opsumit at home patient stated she found medication and will start medication on Sun. And take medication 1x daily    Patient can be reach at:[phone]

## 2023-08-11 NOTE — TELEPHONE ENCOUNTER
Patient called would like call back states Opsumit is going well she has been feeling fine no new swelling from medication now needs a script sent to [facility] #[id number]     Patient states she would like to speak to someone about and available discounts for Opsumit    Patient can be reached: [phone]

## 2023-08-16 NOTE — TELEPHONE ENCOUNTER
Get Go to send to her overnight versus her pick it up, she will run out before the mailman hits her door

## 2023-08-16 NOTE — TELEPHONE ENCOUNTER
Pt requesting status update in regards to opsumit, pt wants to know if she should continue taking    Pt states she has one week left    Pt [phone]

## 2023-08-16 NOTE — TELEPHONE ENCOUNTER
Pt called back to inform Red that a script for macitentan (OPSUMIT) 10 mg tablet needs to be sent to [facility]    Pt asked for a call back when this is completed     Pt can be reached at [phone]    [facility] can be reached at [phone]

## 2023-08-16 NOTE — TELEPHONE ENCOUNTER
Called and advised pt of forms send for [employer] assistance forms.    She will fill out and send.     Marleni WALDROP reports that she was starting voucher end of July, should send to [facility] new prescription.    They sent it through the hub but [facility] discontinued.    She recommended we send again to [facility] I escribed.    Will advise if I hear anything.

## 2023-08-16 NOTE — TELEPHONE ENCOUNTER
PALMA called sulma PT to call hub at [phone]    She reports she just talked to them 2 minutes prior to my call

## 2023-08-16 NOTE — TELEPHONE ENCOUNTER
This is the wrong number for hub it is [phone]    [facility] reports that they are sending the patientt the forms and they will call in two days to see if she got any foundation assistance from the foundations they also gave her.    [facility] has a prescription as well so [facility] is working on this.

## 2023-08-23 NOTE — TELEPHONE ENCOUNTER
"Called reba they are calling me back.  1240 hrs 8/23/2023    \"Nimisha R\" reba called back  I provided Pulmonary Hypertension ICD 10 code I27.20    They report PT will also need this information and requested I provide it for her.    I called PT   and provided the diagnosis and code.  "

## 2023-08-23 NOTE — TELEPHONE ENCOUNTER
I received a call from Ms. Felix.    She is upset that she can't finalize things with Opsumit prescription/assistance.    She spoke with someone at [facility] and was told our office needs to reach out to them at [phone] to supply additional information-ICD 10 code(s) and clinical indication for use.    She only has enough medication through Saturday.    Routing as high priority since time sensitive.    Pt asking for a return call with an update at [phone]-she's worried she's going to run out of medication.

## 2023-08-24 NOTE — TELEPHONE ENCOUNTER
Medicine Refill Request [facility]    Name of Patient: Arielle Felix    Caller's name/Relationship: Arielle Felix      Callback number: [phone]      Medication Name, Dosage, Supply: macitentan (OPSUMIT) 10 mg tablet tablet         Quantity left: None    Pharmacy[facility]    Last Office Visit: 07/25/23  Last Consult Visit: Visit date not found  Last Telemedicine Visit: Visit date not found    Next Appointment: Visit date not found      Current Outpatient Medications:   •  amLODIPine (NORVASC) 5 mg tablet, Take 10 mg by mouth every morning., Disp: , Rfl:   •  amLODIPine (NORVASC) 5 mg tablet, Take 5-10 mg by mouth nightly as needed (for high BP or Raynauds phenomenon of the fingers)., Disp: , Rfl:   •  biotin 1 mg tablet, Take 1,000 mcg by mouth daily., Disp: , Rfl:   •  cholecalciferol, vitamin D3, 1,000 unit (25 mcg) tablet, Take 1,000 Units by mouth daily., Disp: , Rfl:   •  cyanocobalamin, vitamin B-12, 1,000 mcg capsule, Take 1,000 mcg by mouth daily., Disp: , Rfl:   •  docusate sodium (COLACE) 100 mg capsule, Take 100 mg by mouth daily., Disp: , Rfl:   •  levothyroxine (SYNTHROID) 100 mcg tablet, Take 100 mcg by mouth daily., Disp: , Rfl:   •  macitentan (OPSUMIT) 10 mg tablet tablet, Take 1 tablet (10 mg total) by mouth daily., Disp: 30 tablet, Rfl: 11  •  meclizine (ANTIVERT) 25 mg tablet, Take 25 mg by mouth daily as needed., Disp: , Rfl:   •  mupirocin (BACTROBAN) 2 % ointment, Apply 1 application. topically daily. Behind ears, Disp: , Rfl:   •  sildenafiL, pulm.hypertension, (REVATIO) 20 mg tablet, Take 1 tablet (20 mg total) by mouth 3 (three) times a day., Disp: 90 tablet, Rfl: 3      BP Readings from Last 3 Encounters:   07/25/23 130/80   07/25/23 134/80   06/21/23 132/85       Recent Lab results:  Lab Results   Component Value Date    CHOL 194 04/14/2023   ,   Lab Results   Component Value Date    HDL 49 (L) 04/14/2023   ,   Lab Results   Component Value Date    LDLCALC 136 (H) 04/14/2023    ,   Lab Results   Component Value Date    TRIG 44 04/14/2023        Lab Results   Component Value Date    GLUCOSE 82 04/18/2023   ,   Lab Results   Component Value Date    HGBA1C 4.4 04/13/2023         Lab Results   Component Value Date    CREATININE 1.1 04/18/2023       Lab Results   Component Value Date    TSH 4.71 04/13/2023

## 2023-08-28 ENCOUNTER — TELEPHONE (OUTPATIENT)
Dept: SCHEDULING | Facility: CLINIC | Age: 63
End: 2023-08-28
Payer: COMMERCIAL

## 2023-08-28 NOTE — TELEPHONE ENCOUNTER
No answer Marleni Snyder    Called [facility] and advised she is still on it  They will follow up with the patient

## 2023-08-28 NOTE — TELEPHONE ENCOUNTER
Called Ms. Isidro and informed her we can bridge her with some until she gets it. Unfortunately she does not have a ride.     She's confused who to call, she called [facility] and they told her to call us for the Rx.

## 2023-08-28 NOTE — TELEPHONE ENCOUNTER
See other encounter.  [facility] and is working with them.

## 2023-08-28 NOTE — TELEPHONE ENCOUNTER
Maria Antonia - [facility] called and wants to verify if the patient still uses macitentan (OPSUMIT) 10 mg tablet tablet     Maria Antonia stated she will call back on Wednesday and check back for an answer if she doesn't receive a call back prior     Maria Antonia can be reached at [phone] ext. [phone]

## 2023-08-28 NOTE — TELEPHONE ENCOUNTER
Red can you call Marleni Snyder I want to say she might even be out of this medication so they need to overnight ship

## 2023-08-29 ENCOUNTER — TELEPHONE (OUTPATIENT)
Dept: CARDIOLOGY | Facility: CLINIC | Age: 63
End: 2023-08-29
Payer: COMMERCIAL

## 2023-08-29 NOTE — TELEPHONE ENCOUNTER
Approved via [email or web address].     I sent to both [facility] and [facility]    I will email Elias from [facility], thanks

## 2023-08-31 NOTE — TELEPHONE ENCOUNTER
Patient looking to speak to someone regarding this issue with Medication.    Patient can be reached at [phone]

## 2023-08-31 NOTE — TELEPHONE ENCOUNTER
[facility] was supposed to ship today, now another hold up. I emailed higher ups on [facility] to get an answer ASAP, last dose Saturday. Working on this....

## 2023-09-12 ENCOUNTER — TELEPHONE (OUTPATIENT)
Dept: CARDIOLOGY | Facility: CLINIC | Age: 63
End: 2023-09-12
Payer: COMMERCIAL

## 2023-09-12 ENCOUNTER — HOSPITAL ENCOUNTER (OUTPATIENT)
Dept: RADIOLOGY | Facility: HOSPITAL | Age: 63
Setting detail: NUCLEAR MEDICINE
Discharge: HOME | End: 2023-09-12
Attending: INTERNAL MEDICINE
Payer: COMMERCIAL

## 2023-09-12 ENCOUNTER — HOSPITAL ENCOUNTER (OUTPATIENT)
Dept: RADIOLOGY | Facility: HOSPITAL | Age: 63
Discharge: HOME | End: 2023-09-12
Attending: INTERNAL MEDICINE
Payer: COMMERCIAL

## 2023-09-12 DIAGNOSIS — Z01.812 PRE-PROCEDURE LAB EXAM: Primary | ICD-10-CM

## 2023-09-12 DIAGNOSIS — I27.20 PULMONARY HYPERTENSION (CMS/HCC): ICD-10-CM

## 2023-09-12 DIAGNOSIS — I27.20 PULMONARY HYPERTENSION (CMS/HCC): Primary | ICD-10-CM

## 2023-09-12 PROCEDURE — G1004 CDSM NDSC: HCPCS

## 2023-09-12 PROCEDURE — A9540 TC99M MAA: HCPCS | Performed by: INTERNAL MEDICINE

## 2023-09-12 PROCEDURE — A9567 TECHNETIUM TC-99M AEROSOL: HCPCS | Performed by: INTERNAL MEDICINE

## 2023-09-12 PROCEDURE — 71046 X-RAY EXAM CHEST 2 VIEWS: CPT

## 2023-09-12 RX ADMIN — KIT FOR THE PREPARATION OF TECHNETIUM TC 99M ALBUMIN AGGREGATED 5.5 MILLICURIE: 2.5 INJECTION, POWDER, FOR SOLUTION INTRAVENOUS at 10:30

## 2023-09-12 RX ADMIN — KIT FOR THE PREPARATION OF TECHNETIUM TC 99M PENTETATE 43.8 MILLICURIE: 20 INJECTION, POWDER, LYOPHILIZED, FOR SOLUTION INTRAVENOUS; RESPIRATORY (INHALATION) at 10:08

## 2023-09-12 NOTE — TELEPHONE ENCOUNTER
Called Ms. Felix but unfortunately no answer, I did leave VM. I explained that VQ scan was abnormal and showed area of mismatch, possiby old, unresolved blood clot. I noted that we want to do further testing but would want to talk to her about it via phone.   I left my call back number, as well as asked her to call main number to speak with Bridget or Camryn.

## 2023-09-12 NOTE — TELEPHONE ENCOUNTER
Returned her call. Informed her about results of VQ scan and we would like to order CTA chest but will need BMP about a week before.     Will order both tests. She was agreeable and thankful.     Ruchi can you please call her and help assist with scheduling it?    Thank you!

## 2023-09-13 NOTE — TELEPHONE ENCOUNTER
I spoke with Arielle to inform her I needed to get a PA before she can schedule her test. I will obtain the auth for [place], and then call her with the information so she can schedule. I also mailed out the lab slip and reminded her that she is to get that done a week before her test. She confirmed she understood and looks forward to my call.

## 2023-09-19 ENCOUNTER — TELEPHONE (OUTPATIENT)
Dept: SCHEDULING | Facility: CLINIC | Age: 63
End: 2023-09-19
Payer: COMMERCIAL

## 2023-09-19 NOTE — TELEPHONE ENCOUNTER
Dr. Arvizu advised me that he s/w the PCP Resident and was advised that the CTA Chest was denied by insurance. He reports our office will work on getting it approved.

## 2023-09-19 NOTE — TELEPHONE ENCOUNTER
Doctor to Doctor     Name of caller: Dr Asuncion Bruce     Is this an emergency: no but needs addressing today    Doctors full name (be sure to get correct spelling): Dr Asuncion Bruce     Doctors call back number: [phone]    Name of patient: Arielle [facility]UA[facility]ARDO [facility]    Patients : 1960    Nature of the call: recent VQ    Was doctor paged: no

## 2023-09-19 NOTE — TELEPHONE ENCOUNTER
Hi   Please let us know if theres any further updates after resubmitted.     Thank you for your help.

## 2023-09-19 NOTE — TELEPHONE ENCOUNTER
Thank you. Spoke to Ruchi earlier, she had sent for PA the day it was ordered but did not hear back. Will investigate further if it was denied prior to PA or what.

## 2023-09-22 NOTE — TELEPHONE ENCOUNTER
Unfortunately for this test we do not have any control over the booking process. The only thing I can do is call Arielle and see if they offered her any sooner appts, but she took this one.

## 2023-09-22 NOTE — TELEPHONE ENCOUNTER
I left a vm with the information for her CTA. When I went to put the notes in it looks like it had already been scheduled for 10/4.

## 2023-09-22 NOTE — TELEPHONE ENCOUNTER
Came in this morning and I have the auth information. I will call Ms Felix at a more appropriate time to relay the information.

## 2023-09-28 ENCOUNTER — TELEPHONE (OUTPATIENT)
Dept: SCHEDULING | Facility: CLINIC | Age: 63
End: 2023-09-28
Payer: COMMERCIAL

## 2023-09-28 LAB
BUN SERPL-MCNC: 18 MG/DL (ref 8–27)
BUN/CREAT SERPL: 19 (ref 12–28)
CALCIUM SERPL-MCNC: 9.5 MG/DL (ref 8.7–10.3)
CHLORIDE SERPL-SCNC: 107 MMOL/L (ref 96–106)
CO2 SERPL-SCNC: 20 MMOL/L (ref 20–29)
CREAT SERPL-MCNC: 0.96 MG/DL (ref 0.57–1)
EGFRCR SERPLBLD CKD-EPI 2021: 67 ML/MIN/1.73
GLUCOSE SERPL-MCNC: 83 MG/DL (ref 70–99)
POTASSIUM SERPL-SCNC: 4.1 MMOL/L (ref 3.5–5.2)
SODIUM SERPL-SCNC: 140 MMOL/L (ref 134–144)

## 2023-09-28 RX ORDER — PREDNISONE 50 MG/1
50 TABLET ORAL EVERY 6 HOURS
Qty: 3 TABLET | Refills: 0 | Status: SHIPPED | OUTPATIENT
Start: 2023-09-28 | End: 2024-03-16 | Stop reason: HOSPADM

## 2023-09-28 RX ORDER — DIPHENHYDRAMINE HCL 50 MG
50 CAPSULE ORAL ONCE
Qty: 1 CAPSULE | Refills: 0 | Status: CANCELLED | OUTPATIENT
Start: 2023-09-28 | End: 2023-09-28

## 2023-09-28 RX ORDER — PREDNISONE 50 MG/1
50 TABLET ORAL EVERY 6 HOURS
Qty: 3 TABLET | Refills: 0 | Status: CANCELLED | OUTPATIENT
Start: 2023-09-28 | End: 2023-09-29

## 2023-09-28 RX ORDER — DIPHENHYDRAMINE HCL 50 MG
50 CAPSULE ORAL ONCE
Qty: 1 CAPSULE | Refills: 0 | Status: SHIPPED | OUTPATIENT
Start: 2023-09-28 | End: 2023-09-28

## 2023-09-28 NOTE — TELEPHONE ENCOUNTER
Bridget - I just ordered the contrast prep for Ms. Felix, can you please let her know? There are also instructions about how to take them/when to start them. Thank you!

## 2023-09-28 NOTE — TELEPHONE ENCOUNTER
I called Ms. Felxi and informed her we ordered prednisone and benadryl for her to take for her IV contrast allergy.  I told her she is going to take 50 mg every 6 hours for 3 doses before the test.  I told her the last dose should be 1 hour before the test with the benadryl 50 mg.  She verbally understood.

## 2023-09-28 NOTE — TELEPHONE ENCOUNTER
Aefili From [facility] called regarding     Pt scheduled to have CTwwo. Pt is allergic to contrast die. Please prescribe   Prednisone 50mg   Ph:[phone]

## 2023-10-04 ENCOUNTER — HOSPITAL ENCOUNTER (OUTPATIENT)
Dept: RADIOLOGY | Facility: HOSPITAL | Age: 63
Discharge: HOME | End: 2023-10-04
Attending: PHYSICIAN ASSISTANT
Payer: COMMERCIAL

## 2023-10-04 DIAGNOSIS — I27.20 PULMONARY HYPERTENSION (CMS/HCC): ICD-10-CM

## 2023-10-04 PROCEDURE — 71275 CT ANGIOGRAPHY CHEST: CPT | Mod: MG

## 2023-10-04 PROCEDURE — 63600105 HC IODINE BASED CONTRAST: Mod: JZ | Performed by: PHYSICIAN ASSISTANT

## 2023-10-04 RX ADMIN — IOHEXOL 100 ML: 350 INJECTION, SOLUTION INTRAVENOUS at 13:28

## 2023-10-06 NOTE — TELEPHONE ENCOUNTER
Nolberto pt - pt calling to discuss results of CT Angiogram done 10/4    Pt continues with on and off nosebleed which causes her to spit blood in sputum which is happening again today.  Instructed if continues with nosebleeds she should be follow up with ENT.     No BP today.  HA continues uses ice packs as needed.    Pt states that a medication was sent to pharmacy that she was not aware of but does not know the name of the medication.  She said she was told it was a transplant medication and she did not take it home.    She did pick of prednisone and benadryl prior to CT.    Pt can be reached [phone]

## 2023-10-06 NOTE — TELEPHONE ENCOUNTER
Called and left  for Ms. Felix, infromed her that her CTA was negative for PE.   I also told her we didn't send any medications form out office, but if she knows the name of the medication, she can give us a call back and let us know what it is.     Left my call back number.

## 2023-10-16 ENCOUNTER — TELEPHONE (OUTPATIENT)
Dept: SCHEDULING | Facility: CLINIC | Age: 63
End: 2023-10-16
Payer: COMMERCIAL

## 2023-10-16 NOTE — TELEPHONE ENCOUNTER
Patient calling in to reschedule appt that she has on 10/23.    Patient was offered next available which was 4/2/24 @ 3pm.  Patient declined.  She wants a sooner appt around 11am    Patient can be reached at [phone]

## 2023-11-24 ENCOUNTER — TELEPHONE (OUTPATIENT)
Dept: CARDIOLOGY | Facility: CLINIC | Age: 63
End: 2023-11-24
Payer: COMMERCIAL

## 2023-11-24 NOTE — TELEPHONE ENCOUNTER
Sildenafil renewal Prior authorization started-- sent to plan along with supporting documentation.    Outcome  Approved today  CaseId:[id number];Status:Approved;Review Type:Prior Auth;Coverage Start Date:10/25/2023;Coverage End Date:11/23/2024;  Authorization Expiration Date: 11/22/2024

## 2024-01-04 RX ORDER — SILDENAFIL CITRATE 20 MG/1
20 TABLET ORAL 3 TIMES DAILY
Qty: 270 TABLET | Refills: 3 | Status: SHIPPED | OUTPATIENT
Start: 2024-01-04 | End: 2025-03-01

## 2024-01-04 NOTE — TELEPHONE ENCOUNTER
[facility].     Last OV w/ Dr. Arvizu was on 7/25/23.   Upcoming appt 2/1/24.     Refill sent after chart review.

## 2024-01-31 NOTE — PROGRESS NOTES
I had the pleasure of seeing Arielle Felix (: 1960) in the [facility] Heart Failure and Pulmonary Hypertension clinic on 2024. She presents today with her care giver, Sara.     Ms. Felix is a 63 y.o. female with a past medical history of Pulmonary HTN, HTN, Raynaud's Disease, Cutaneous Systemic Scleroderma (dx ), ILD, PE (3/2023). She is a patient of Dr. Nguyễn. She was previously followed by Dr. Jos Valdez at [facility]. Echocardiogram from 2022 showed LVEF: 55-60%, mild aortic valve thickening, pulmonary artery systolic pressure: 52 mmHg and trivial pericardial effusion. LHC and RHC (separate occasions) over the last 5-10 years which showed normal hemodynamics and normal coronaries. She had a RHC 2020 showing top-normal right sided filling pressures with mild pulmonary hypertension, top-normal PVR and normal pulmonary capillary wedge pressure. The cardiac index was normal, seen below.     On 2022, she was in [facility] [place] chest pain. EKG: NSR without ischemic changes. hsTrop: 12->16, d-dimer: 0.56, CXR showed cardiomegaly and diffuse interstitial prominence.     Dr. Nguyễn ordered echocardiogram, which was completed on 2022 and showed estimated RVSP = 50-55 mmHg, normal-sized LV with normal LV systolic function. Estimated EF 55- 60%. Wall motion grossly normal, mild concentric left ventricular hypertrophy, grade I LV diastolic dysfunction, probably normal RV size and function.    At her initial visit on 10/11/2022, she reported that she felt very short of breath with minimal activity most of the time. She reports that this started about 1 year prior and had progressively gotten worse. She described PND and bendopnea. She reported that she experienced ankle swelling, worsened over the past year and usually worse towards the end of the night. She also reported some swelling in her abdomen. She reported daily palpations. I recommended a RHC which was done  11/28/2022 and showed: Normal right sided filling pressures. Mildly elevated left sided filling pressures. Precapillary pulmonary hypertension with mean PA 36 mmHg.    She was hospitalized on 3/19/2023 at [facility] for PE diagnosed on V/Q scan. She reports that she had presented with left arm pain and heaviness. She states that she was started on Apixaban. One week after her last office visit (4/6/2023), she presented to [facility] with chest pain, epistaxis and hemoptysis. Echocardiogram showed: left ventricular cavity size normal with mild left ventricular hypertrophy and preserved systolic function. Estimated EF 65%. Chest CTA showed no evidence of PE; Apixaban was discontinued.     She had a repeat echocardiogram (6/2023) which showed: Normal left ventricular cavity size and mild concentric left ventricular hypertrophy. Normal systolic function. EF: 60%. Normal right ventricular structure and function. Mild tricuspid valve regurgitation with estimated RVSP: 55 mmHg. Compared to previous study from 4/14/2023, estimated right ventricular systolic pressure were higher.    At her last office visit on 7/25/2023, she reported that she was not sure if she received/started the Macitentan. She stated at the time we initiated it, her Pulmonologist also started her on a new medication (Mycophenolate), and she had significant GI intolerance with this. It was trialed at various doses but ultimately stopped. At that visit, I asked her to check if she had Macitentan and if she did to start it and monitor her SpO2.    She underwent V/Q scan in September 2023, which showed intermediate-to-high probability of pulmonary embolic disease, as a result she completed CTA Chest PE which showed: no evidence for filling defect or vessel cutoff to suggest pulmonary embolism. Enlargement of the pulmonary arteries compatible with pulmonary arterial hypertension was seen.    She reports she started Macitentan around August or  "September 2023. She states that she has not been checking her SpO2. She reports she did not notice any difference since starting it and sometimes thinks she may feel a little worse. She reports she has been feeling more shortness of breath at times with ADLs. She reports orthopnea and palpitations at night. She states that she is unsure if she has leg swelling, but reports her toes are cold and \"burning\". She reports unchanged chest pain, dizziness and lightheadedness.     She reports has been having heart burn.     She reports she is no longer seeing Dr. Flesch and would like a new referral to a new Pulmonologist.   ---    Rheumatology: Enzo ([facility])  Pulmonology: Flesch -> Pratibha Abbott ([facility], (f) [phone])     Past Medical / Surgical History:  Pulmonary HTN, HTN, Raynaud's Disease, Cutaneous Systemic Scleroderma (dx 1998), ILD, PE (3/2023), Follicular Carcinoma of Thyroid, Tenosynovitis, de Quervain, h/o Hernia Repair, h/o Appendicitis, h/o Digit Amputation, H/o Total Thyroidectomy (Dr. Pacheco at [facility]; 4/2013)    Family & Social History: She is , she has two children. She works as an .     Tobacco: former 2005  EtOH: never   Illicits: never     Her brother has CAD with stent  1st cousin with SLE  Both parents had \"heart problems\"    Allergies:   Allergies   Allergen Reactions   • Aspirin Shortness of breath     Other reaction(s): Unknown  \"stop breathing\"     • Ibuprofen Shortness of breath     Stop breathing   • Iodine Shortness of breath     Other reaction(s): Unknown   • Iodine And Iodide Containing Products Shortness of breath     ? rash   • Penicillins Shortness of breath     Other reaction(s): Unknown   • Sulfa (Sulfonamide Antibiotics) Angioedema   • Clindamycin    • Hydrochlorothiazide      Other reaction(s): Abdominal Pain   Slow heart rate   • Oxycodone      Other reaction(s): Vomiting   • Sulfamethoxazole-Trimethoprim      Other reaction(s): Vomiting, Weakness    • Latex " Rash       Medications:   Current Outpatient Medications   Medication Sig Dispense Refill   • amLODIPine (NORVASC) 5 mg tablet Take 10 mg by mouth every morning.     • amLODIPine (NORVASC) 5 mg tablet Take 5-10 mg by mouth nightly as needed (for high BP or Raynauds phenomenon of the fingers).     • biotin 1 mg tablet Take 1,000 mcg by mouth daily.     • cholecalciferol, vitamin D3, 1,000 unit (25 mcg) tablet Take 1,000 Units by mouth daily.     • cyanocobalamin, vitamin B-12, 1,000 mcg capsule Take 1,000 mcg by mouth daily.     • docusate sodium (COLACE) 100 mg capsule Take 100 mg by mouth daily.     • levothyroxine (SYNTHROID) 100 mcg tablet Take 100 mcg by mouth daily.     • macitentan (OPSUMIT) 10 mg tablet tablet Take 1 tablet (10 mg total) by mouth daily. 30 tablet 11   • meclizine (ANTIVERT) 25 mg tablet Take 25 mg by mouth daily as needed.     • mupirocin (BACTROBAN) 2 % ointment Apply 1 application. topically daily. Behind ears     • sildenafiL, pulm.hypertension, (REVATIO) 20 mg tablet Take 1 tablet (20 mg total) by mouth 3 (three) times a day. 270 tablet 3   • predniSONE (DELTASONE) 50 mg tablet Take 1 tablet (50 mg total) by mouth every 6 (six) hours for 3 doses. Take 1 tab 13 hrs, 1 tab 7 hrs, and 1 tab 1 hr before exam. 3 tablet 0     No current facility-administered medications for this visit.       Review of Systems:   All other systems reviewed and are negative except as per HPI.    Physical Exam:     Wt Readings from Last 10 Encounters:   02/01/24 47.6 kg (105 lb)   07/25/23 50.8 kg (112 lb)   07/25/23 50.8 kg (112 lb)   06/21/23 51.3 kg (113 lb)   04/19/23 51.7 kg (113 lb 14.4 oz)   04/06/23 52.6 kg (116 lb)   01/18/23 54.4 kg (120 lb)   11/28/22 55.3 kg (122 lb)   10/07/22 56.2 kg (124 lb)   08/22/22 57.2 kg (126 lb)       BP Readings from Last 5 Encounters:   02/01/24 120/70   07/25/23 130/80   07/25/23 134/80   06/21/23 132/85   04/19/23 (!) 130/57       Vitals:    02/01/24 1354   BP: 120/70    Pulse: (!) 104       Body mass index is 16.95 kg/m².  Body surface area is 1.49 meters squared.    Constitutional: NAD, AAOx3  HENT: Normocephalic, atraumatic head, sclerae anicteric, no cervical lymphadenopathy, trachea midline  Cardiovascular: RRR, no murmurs, rubs or gallops, JVP: 8 cm H2O   cm H2O, no carotid bruits.  Respiratory: CTA bilateral lung fields, no wheezes, rales or rhonchi  GI: soft, non-tender/non-distended  Musculoskeletal / Extremities: no peripheral edema, +2 pulses bilateral radial, DP/PT arteries, skin tightening on face and fingertips  Skin: no rashes  Neuro / Psych: no focal deficits, CNII-XII grossly intact, appropriate, cooperative    Diagnostic Data:     Component      Latest Ref Rn 4/18/2023 9/27/2023   Sodium      134 - 144 mmol/L 139  140    Potassium, Bld      3.5 - 5.2 mmol/L 4.4  4.1    Chloride      96 - 106 mmol/L 108  107 (H)    CO2      20 - 29 mmol/L 23  20    BUN      8 - 27 mg/dL 26 (H)  18    Creatinine      0.57 - 1.00 mg/dL 1.1  0.96    Glucose      70 - 99 mg/dL 82  83    Calcium      8.7 - 10.3 mg/dL 9.1  9.5    eGFR      >59 mL/min/1.73 >60.0  67      Component      Latest Ref [facility] 4/13/2023   Hemoglobin A1C      <5.7 % 4.4      Component      Latest Ref Rn 4/13/2023 4/18/2023   WBC      3.80 - 10.50 K/uL 7.12  6.95    Hemoglobin      11.8 - 15.7 g/dL 10.6 (L)  10.1 (L)    Hematocrit      35.0 - 45.0 % 34.3 (L)  33.0 (L)    MCV      83.0 - 98.0 fL 87.7  90.4    Platelets      150 - 369 K/uL 267  256        Component      Latest Ref Rn 4/14/2023   Triglycerides      30 - 149 mg/dL 44    Cholesterol      <=200 mg/dL 194    HDL      >=55 mg/dL 49 (L)    LDL Calculated      <=100 mg/dL 136 (H)    Non-HDL, Calculated      mg/dL 145    RISK      <=5.0  4.0       Component      Latest Ref Rn 4/13/2023   High Sens Troponin I      <15.0 pg/mL 23.0 (H)       Component      Latest Ref Rng 4/14/2023   Ferritin      11 - 250 ng/mL 75      Component      Latest Ref Rng 4/13/2023    TSH      0.34 - 5.60 mIU/L 4.71      Component      Latest Ref Rng 4/14/2023   Iron      35 - 150 ug/dL 29 (L)    TIBC      270 - 460 ug/dL 245 (L)    UIBC      180 - 360 ug/dL 216    Iron Saturation      15 - 45 % 12 (L)        Component      Latest Ref Rng 6/27/2022 4/13/2023   BNP      <=100 pg/mL 111 (H)  105 (H)                                     ---    ECG (2/1/2024, personally reviewed): Sinus Tachycardia (HR: 104 bpm), ANA, poor R-wave progression, non-specific T-wave abnormality, low voltage  ---    CTA Chest PE (10/4/2023):  IMPRESSION:  1. No evidence for filling defect or vessel cutoff to suggest pulmonary  embolism.  Enlargement of the pulmonary arteries compatible with pulmonary  arterial hypertension.  2. Changes throughout the lungs as described above which can be seen with  systemic sclerosis/scleroderma.  Underlying pneumonia the lung bases cannot be  entirely excluded in the proper clinical setting.  3.  Additional stable findings as described above.        VQ (9/12/2023):  IMPRESSION:  Intermediate to high probability of pulmonary embolic disease.     6MWT (7/25/2023, on Sildenafil 20 mg TID):        TTE (6/21/2023, personally reviewed):  1. Normal left ventricular cavity size and mild concentric left ventricular hypertrophy. Normal systolic function. EF: 60%. No regional wall motion abnormalities. Grade I diastolic dysfunction.   2. Aortic valve cusps not well visualized. Trace aortic regurgitation. Aortic valve and root sclerosis.  3. Thickened mitral valve leaflets. Mild mitral annular calcification. Trace mitral regurgitation. Mildly dilated left atrial size.   4. Normal right ventricular structure and function. Mild tricuspid valve regurgitation with estimated RVSP: 55 mmHg (assuming right atrial pressure of 3 mmHg). Normal right atrial size. Pulmonic valve not well visualized. Trace pulmonic valve regurgitation. Pulsed wave Doppler of the RVOT systolic profile show decreased acceleration  times ( msec) and late systolic notching consistent with elevated PVR.   5. IVC size is normal with > 50% inspiratory collapse consistent with normal right atrial pressure. Small pericardial effusion without echocardiographic evidence of tamponade.  6. Compared to previous study from 4/14/2023, estimated right ventricular systolic pressure is higher.        TTE (4/14/2023, personally reviewed):   • The left ventricular cavity size is normal with mild left ventricular hypertrophy and preserved systolic function. Estimated EF 65%. No regional wall motion abnormalities. Grade I diastolic dysfunction.     • The aortic valve is tricuspid. Aortic valve sclerosis. Trace aortic regurgitation.      • The visualized portions of the aortic root and ascending aorta are normal in size.     • The mitral valve is mildly thickened. Mild mitral annular calcification. Trace mitral regurgitation.       • The left atrium is severely dilated.      • The right ventricle is normal in size with preserved systolic function     • The pulmonic valve is not well seen.     • The tricuspid valve is normal in appearance. mild tricuspid regurgitation with an estimated RVSP of 34 mmHg.       • Right atrium is normal in size.     • The IVC is small and collapses > 50% with inspiration consistent with normal right atrial pressures.     • Small pericardial effusion, mostly posterior.       • Compared to previous echocardiogram from 8/22/2022, there is no significant change        TTE (11/28/2022, personally reviewed):   • Normal right- and mildly elevated left-sided filling pressures (RA: 3 mmHg, PCWP: 13 mmHg)  • Elevated pulmonary artery pressures (60/22(35 mmHg)) in the setting of PCWP: 13 mmHg.   • Normal cardiac output and index (CO: 5.1 L/min, CI: 3.2 L/min/m2)  • PVR: 4.3 Wood units. SVR: 1840 dynes/sec/cm5      TTE (8/22/2022, personally reviewed):  · Normal-sized LV. Normal LV systolic function. Estimated EF 55- 60%. Wall motion appears  grossly normal. Mild concentric left ventricular hypertrophy. Grade I LV diastolic dysfunction.  · Pulmonic valve not well visualized. Grossly normal pulmonic valve structure. Trace pulmonic valve regurgitation. No pulmonic valve stenosis.  · Aortic valve not well visualized. Aortic valve not well seen but likely trileaflet. Focal aortic valve calcification present. Sclerotic aortic valve leaflets. Mild aortic valve regurgitation. No aortic valve stenosis.  · RV not well visualized. Normal-sized RV. Normal RV systolic function.  · Normal-sized RA.  · There is a small loculated pericardial effusion anterior to the right atrium. No cardiac tamponade.  · Sclerotic mitral valve. Mitral valve calcification of the anterior leaflet present.  · Trace mitral valve regurgitation.  · No significant mitral valve stenosis.  · Normal-sized IVC. IVC demonstrates normal respiratory collapse.  · Tricuspid valve structure is grossly normal. Mild to moderate tricuspid valve regurgitation.  · Estimated RVSP = 50-55 mmHg.  · Mildly dilated LA.  · No previous echocardiogram available for comparison.     TTE (4/21/2022, outside study):  This result has an attachment that is not available.   •  A complete transthoracic echocardiogram (including 2D, color flow   Doppler, spectral Doppler and M-mode imaging) was performed using the   standard protocol. The study quality was adequate.   •  The left ventricle is normal in size. There is moderately increased   wall thickness consistent with moderate concentric hypertrophy.   Endocardium is incompletely visualized, but no regional wall motion   abnormalities are noted in the visualized segments. Normal left   ventricular ejection fraction. The left ventricular ejection fraction by   visual estimate is 55% to 60%.   •  The right ventricle is normal in size. There is normal function of the   right ventricle.   •  The left atrium is normal in size. Left atrial volume is 58 mL.   •  The right atrial  volume measures 52 mL. The right atrial volume index   measures 34 mL/m2.   •  There is trace mitral regurgitation. There is no mitral stenosis.   •  The aortic valve is trileaflet. There is mild aortic valve thickening.   There is no aortic stenosis. There is trace aortic regurgitation.   •  There is mild to moderate tricuspid regurgitation. Pulmonary artery   systolic pressure measures 52 mmHg. The pulmonary artery systolic pressure   is moderately elevated.   •  The aorta measures 3.2 cm at the sinus of Valsalva. The proximal   segment of the ascending aorta is mildly enlarged. The proximal segment of   the ascending aorta measures 3.4 cm. The proximal segment of the ascending   aorta measures 2.2 cm/m2, indexed for body surface area.   •  Trivial pericardial effusion present. There is no echocardiographic   evidence of cardiac tamponade.   •  The inferior vena cava is normal in size (diameter <21 mm) and   decreases >50% in size with inspiration, suggesting a normal right atrial   pressure of 3 mmHg (range 0-5 mm Hg).   •  Compared to the prior study of 3/13/2020, there are no significant   changes.        PFT (3/23/2022):      PFT (7/14/2021):      CT Chest (5/2/2021, outside study):  CLINICAL INFORMATION: Systemic sclerosis. Interstitial lung disease. Groundglass nodule     PROCEDURE: Unenhanced CT of the chest was performed using thin section reconstructions. Images were obtained on inspiration and expiration.     COMPARISON: June and August 2020     FINDINGS:     LUNGS, PLEURA:     Groundglass opacities with reticulation with subpleural sparing in the lower lobes, without honeycombing or architectural distortion, unchanged.     Right upper lobe groundglass nodule, image 114 of series 3, 8 x 11 mm, previously 6 mm.     Expiratory images are of diagnostic quality and do not demonstrate evidence of clinically significant air trapping.     CARDIOVASCULAR, MEDIASTINUM, THYROID: Coronary calcifications. Trace  pericardial effusion.     LYMPH NODES: Subcentimeter mediastinal lymph nodes, unchanged. Unchanged axillary lymph nodes.     SKELETON, CHEST WALL: No destructive bone lesion     UPPER ABDOMEN: Patulous esophagus. 3 mm right renal stone.       RHC (9/02/2020):  RA   8 mmHg   RV   40/5 mmHg   PA (mean)  44/16 (25) mmHg   PCWP   12 mmHg   CO   4.65 lpm (Thermodilution)   CI   2.65 lpm/m-squared   SVI    33 ml/beat/m-squared (lower limit normal 29)   PVR   2.8 mmHg/l/min (Wood units)   SVR   2064 dyne-sec-cm-5         PFT (3/18/2020):      TTE (3/13/2020, outside study):  · The study quality was good.   · The left ventricle is normal in size. Top normal left ventricular wall   thickness. There are no segmental wall motion abnormalities. The left   ventricular ejection fraction is 55-60% by visual estimate and 3D   echocardiography. Normal left ventricular ejection fraction.   · There is grade I (mild) LV diastolic dysfunction.   · The right ventricle is normal in size. There is normal function of the   right ventricle.   · The right atrium is mildly dilated.   · There is mild mitral valve anterior leaflet thickening. There is mild   mitral valve leaflet calcification. There is flattening of the mitral   leaflets without raphael prolapse. There is trace mitral regurgitation.   · The aortic valve is trileaflet. There is mild thickening of the aortic   valve. There is mild calcification of the aortic valve. There is no aortic   /stenosis. There is trace aortic regurgitation.   · There is mild tricuspid valve leaflet thickening. There is mild to   moderate tricuspid regurgitation. The pulmonary artery systolic pressure   is moderately elevated. Pulmonary artery systolic pressure measures 50   mmHg. There is mid-systolic notching of the RVOT VTI consistent with   elevated pulmonary vascular resistance.   · The inferior vena cava is normal in size (diameter <21 mm) and decreases   >50% in size with inspiration, suggesting a  normal right atrial pressure   of 3 mmHg (range 0-5 mm Hg).   · Trivial loculated pericardial effusion present adjacent to the right   ventricle and adjacent to the right atrium.          Assessment / Plan:     1. Pulmonary Hypertension (WHO Group I, NYHA/WHO FC III): Occurring in the background of Systemic Scleroderma with ILD. August 2022 TTE showed normal RV size and function, but progressive exertional decline, worsened DLCO and resting tachycardia suggest there may be progression of her pulmonary vascular disease and limitation of cardiac output. This was proven with 11/2022 RHC showing mean PA 35 mmHg (with PCWP 13 mmHg) and PVR 4.3 Wood units. June 2023 TTE showed increase in RVSP.     She is euvolemic on exam today. She will continue Sildenafil 20 mg TID, and Macitentan 10 mg daily for combination therapy.     I have asked her to wear a ZIO patch due to palpitations which are occurring daily. I have asked her to repeat an echocardiogram and   6-minute walk test with her next visit.    2. HTN: Controlled. She is continued on Amlodipine.     3. Systemic Scleroderma / Raynaud's Disease: Per Rheumatology (Dr. Giraldo).      4. ILD: Per Pulmonology and Rheumatology (Dr. Giraldo). She has not tolerated trials of Mycophenolate. She has asked for Pulmonology recommendation, and I have given her a referral to Dr. Wasserman.     5. PE: She has had elevated probability on V/Q scans, but CTA Chest has consistently not shown evidence for acute or chronic thromboembolism. Apixaban has been discontinued.     Thank you for allowing me to participate in the care of your patient. I would like to see her in the office in 3 months with new Echocardiogram and 6MWT. I have asked her to labs in 1-2 months.     Please do not hesitate to contact me with any questions or issues.     Sincerely,     Timothy Arvizu, DO  Cardiology / Heart Failure / Pulmonary Hypertension    [email or web address]    [phone]    [facility]  [facility]  "[address]   [place] [id number]      Counseling and coordination of care consisted of more than 50% of this complex medical evaluation; numerous diagnostic and management options were considered and testing results were reviewed and discussed with the patient as best as able, during this encounter; the patient has multiple medical problems and is on numerous medications; the patient has a high risk of complications due to their cardiac disease, and all these resulted in the assigned \"complex medical decision-making\" characterization and coding of this patient encounter.  I spent 40 minutes on this date of service performing the following activities: obtaining history, performing examination, entering orders, documenting, preparing for visit, obtaining / reviewing records, providing counseling and education and independently reviewing study/studies.  "

## 2024-02-01 ENCOUNTER — OFFICE VISIT (OUTPATIENT)
Dept: CARDIOLOGY | Facility: CLINIC | Age: 64
End: 2024-02-01
Payer: COMMERCIAL

## 2024-02-01 VITALS
DIASTOLIC BLOOD PRESSURE: 70 MMHG | WEIGHT: 105 LBS | SYSTOLIC BLOOD PRESSURE: 120 MMHG | HEIGHT: 66 IN | BODY MASS INDEX: 16.88 KG/M2 | HEART RATE: 104 BPM

## 2024-02-01 DIAGNOSIS — I51.9 RIGHT VENTRICULAR DYSFUNCTION: ICD-10-CM

## 2024-02-01 DIAGNOSIS — R00.2 PALPITATIONS: ICD-10-CM

## 2024-02-01 DIAGNOSIS — J84.9 INTERSTITIAL LUNG DISEASE (CMS/HCC): ICD-10-CM

## 2024-02-01 DIAGNOSIS — I27.20 PULMONARY HYPERTENSION (CMS/HCC): Primary | ICD-10-CM

## 2024-02-01 DIAGNOSIS — I10 ESSENTIAL (PRIMARY) HYPERTENSION: ICD-10-CM

## 2024-02-01 DIAGNOSIS — I73.01 RAYNAUD'S DISEASE WITH GANGRENE (CMS/HCC): ICD-10-CM

## 2024-02-01 PROCEDURE — 3078F DIAST BP <80 MM HG: CPT | Performed by: INTERNAL MEDICINE

## 2024-02-01 PROCEDURE — 99215 OFFICE O/P EST HI 40 MIN: CPT | Mod: 25 | Performed by: INTERNAL MEDICINE

## 2024-02-01 PROCEDURE — 3074F SYST BP LT 130 MM HG: CPT | Performed by: INTERNAL MEDICINE

## 2024-02-01 PROCEDURE — 3008F BODY MASS INDEX DOCD: CPT | Performed by: INTERNAL MEDICINE

## 2024-02-01 PROCEDURE — 93000 ELECTROCARDIOGRAM COMPLETE: CPT | Mod: XU | Performed by: INTERNAL MEDICINE

## 2024-02-01 NOTE — LETTER
2024     Sara Simmons MD  [address] [facility] [address] [place] [id number]    Patient: Arielle Felix  YOB: 1960  Date of Visit: 2024      Dear Dr. Simmons:    Thank you for referring Arielle Felix to me for evaluation. Below are my notes for this consultation.    If you have questions, please do not hesitate to call me. I look forward to following your patient along with you.         Sincerely,        Timothy Arvizu,         CC: MD Kai Tao MD Clark, John, DO  2024 11:53 AM  Signed  I had the pleasure of seeing Arielle Felix (: 1960) in the [facility] Heart Failure and Pulmonary Hypertension clinic on 2024. She presents today with her care giver, Sara.     Ms. Felix is a 63 y.o. female with a past medical history of Pulmonary HTN, HTN, Raynaud's Disease, Cutaneous Systemic Scleroderma (dx ), ILD, PE (3/2023). She is a patient of Dr. Nguyễn. She was previously followed by Dr. Jos Valdez at [facility]. Echocardiogram from 2022 showed LVEF: 55-60%, mild aortic valve thickening, pulmonary artery systolic pressure: 52 mmHg and trivial pericardial effusion. LHC and RHC (separate occasions) over the last 5-10 years which showed normal hemodynamics and normal coronaries. She had a RHC 2020 showing top-normal right sided filling pressures with mild pulmonary hypertension, top-normal PVR and normal pulmonary capillary wedge pressure. The cardiac index was normal, seen below.     On 2022, she was in [facility] ER for chest pain. EKG: NSR without ischemic changes. hsTrop: 12->16, d-dimer: 0.56, CXR showed cardiomegaly and diffuse interstitial prominence.     Dr. Nguyễn ordered echocardiogram, which was completed on 2022 and showed estimated RVSP = 50-55 mmHg, normal-sized LV with normal LV systolic function. Estimated EF 55- 60%. Wall motion grossly normal, mild concentric left ventricular  hypertrophy, grade I LV diastolic dysfunction, probably normal RV size and function.    At her initial visit on 10/11/2022, she reported that she felt very short of breath with minimal activity most of the time. She reports that this started about 1 year prior and had progressively gotten worse. She described PND and bendopnea. She reported that she experienced ankle swelling, worsened over the past year and usually worse towards the end of the night. She also reported some swelling in her abdomen. She reported daily palpations. I recommended a RHC which was done 11/28/2022 and showed: Normal right sided filling pressures. Mildly elevated left sided filling pressures. Precapillary pulmonary hypertension with mean PA 36 mmHg.    She was hospitalized on 3/19/2023 at [facility] for PE diagnosed on V/Q scan. She reports that she had presented with left arm pain and heaviness. She states that she was started on Apixaban. One week after her last office visit (4/6/2023), [place] [facility] [place] pain, epistaxis and hemoptysis. Echocardiogram showed: left ventricular cavity size normal with mild left ventricular hypertrophy and preserved systolic function. Estimated EF 65%. Chest CTA showed no evidence of PE; Apixaban was discontinued.     She had a repeat echocardiogram (6/2023) which showed: Normal left ventricular cavity size and mild concentric left ventricular hypertrophy. Normal systolic function. EF: 60%. Normal right ventricular structure and function. Mild tricuspid valve regurgitation with estimated RVSP: 55 mmHg. Compared to previous study from 4/14/2023, estimated right ventricular systolic pressure were higher.    At her last office visit on 7/25/2023, she reported that she was not sure if she received/started the Macitentan. She stated at the time we initiated it, her Pulmonologist also started her on a new medication (Mycophenolate), and she had significant GI intolerance with this. It  "was trialed at various doses but ultimately stopped. At that visit, I asked her to check if she had Macitentan and if she did to start it and monitor her SpO2.    She underwent V/Q scan in September 2023, which showed intermediate-to-high probability of pulmonary embolic disease, as a result she completed CTA Chest PE which showed: no evidence for filling defect or vessel cutoff to suggest pulmonary embolism. Enlargement of the pulmonary arteries compatible with pulmonary arterial hypertension was seen.    She reports she started Macitentan around August or September 2023. She states that she has not been checking her SpO2. She reports she did not notice any difference since starting it and sometimes thinks she may feel a little worse. She reports she has been feeling more shortness of breath at times with ADLs. She reports orthopnea and palpitations at night. She states that she is unsure if she has leg swelling, but reports her toes are cold and \"burning\". She reports unchanged chest pain, dizziness and lightheadedness.     She reports has been having heart burn.     She reports she is no longer seeing Dr. Flesch and would like a new referral to a new Pulmonologist.   ---    Rheumatology: Enzo ([facility])  Pulmonology: Flesch -> Pratibha Abbott ([facility], (f) [phone])     Past Medical / Surgical History:  Pulmonary HTN, HTN, Raynaud's Disease, Cutaneous Systemic Scleroderma (dx 1998), ILD, PE (3/2023), Follicular Carcinoma of Thyroid, Tenosynovitis, de Quervain, h/o Hernia Repair, h/o Appendicitis, h/o Digit Amputation, H/o Total Thyroidectomy (Dr. Pacheco at [facility]; 4/2013)    Family & Social History: She is , she has two children. She works as an .     Tobacco: former 2005  EtOH: never   Illicits: never     Her brother has CAD with stent  1st cousin with SLE  Both parents had \"heart problems\"    Allergies:   Allergies   Allergen Reactions   • Aspirin Shortness of breath     Other " "reaction(s): Unknown  \"stop breathing\"     • Ibuprofen Shortness of breath     Stop breathing   • Iodine Shortness of breath     Other reaction(s): Unknown   • Iodine And Iodide Containing Products Shortness of breath     ? rash   • Penicillins Shortness of breath     Other reaction(s): Unknown   • Sulfa (Sulfonamide Antibiotics) Angioedema   • Clindamycin    • Hydrochlorothiazide      Other reaction(s): Abdominal Pain   Slow heart rate   • Oxycodone      Other reaction(s): Vomiting   • Sulfamethoxazole-Trimethoprim      Other reaction(s): Vomiting, Weakness    • Latex Rash       Medications:   Current Outpatient Medications   Medication Sig Dispense Refill   • amLODIPine (NORVASC) 5 mg tablet Take 10 mg by mouth every morning.     • amLODIPine (NORVASC) 5 mg tablet Take 5-10 mg by mouth nightly as needed (for high BP or Raynauds phenomenon of the fingers).     • biotin 1 mg tablet Take 1,000 mcg by mouth daily.     • cholecalciferol, vitamin D3, 1,000 unit (25 mcg) tablet Take 1,000 Units by mouth daily.     • cyanocobalamin, vitamin B-12, 1,000 mcg capsule Take 1,000 mcg by mouth daily.     • docusate sodium (COLACE) 100 mg capsule Take 100 mg by mouth daily.     • levothyroxine (SYNTHROID) 100 mcg tablet Take 100 mcg by mouth daily.     • macitentan (OPSUMIT) 10 mg tablet tablet Take 1 tablet (10 mg total) by mouth daily. 30 tablet 11   • meclizine (ANTIVERT) 25 mg tablet Take 25 mg by mouth daily as needed.     • mupirocin (BACTROBAN) 2 % ointment Apply 1 application. topically daily. Behind ears     • sildenafiL, pulm.hypertension, (REVATIO) 20 mg tablet Take 1 tablet (20 mg total) by mouth 3 (three) times a day. 270 tablet 3   • predniSONE (DELTASONE) 50 mg tablet Take 1 tablet (50 mg total) by mouth every 6 (six) hours for 3 doses. Take 1 tab 13 hrs, 1 tab 7 hrs, and 1 tab 1 hr before exam. 3 tablet 0     No current facility-administered medications for this visit.       Review of Systems:   All other systems " reviewed and are negative except as per HPI.    Physical Exam:     Wt Readings from Last 10 Encounters:   02/01/24 47.6 kg (105 lb)   07/25/23 50.8 kg (112 lb)   07/25/23 50.8 kg (112 lb)   06/21/23 51.3 kg (113 lb)   04/19/23 51.7 kg (113 lb 14.4 oz)   04/06/23 52.6 kg (116 lb)   01/18/23 54.4 kg (120 lb)   11/28/22 55.3 kg (122 lb)   10/07/22 56.2 kg (124 lb)   08/22/22 57.2 kg (126 lb)       BP Readings from Last 5 Encounters:   02/01/24 120/70   07/25/23 130/80   07/25/23 134/80   06/21/23 132/85   04/19/23 (!) 130/57       Vitals:    02/01/24 1354   BP: 120/70   Pulse: (!) 104       Body mass index is 16.95 kg/m².  Body surface area is 1.49 meters squared.    Constitutional: NAD, AAOx3  HENT: Normocephalic, atraumatic head, sclerae anicteric, no cervical lymphadenopathy, trachea midline  Cardiovascular: RRR, no murmurs, rubs or gallops, JVP: 8 cm H2O   cm H2O, no carotid bruits.  Respiratory: CTA bilateral lung fields, no wheezes, rales or rhonchi  GI: soft, non-tender/non-distended  Musculoskeletal / Extremities: no peripheral edema, +2 pulses bilateral radial, DP/PT arteries, skin tightening on face and fingertips  Skin: no rashes  Neuro / Psych: no focal deficits, CNII-XII grossly intact, appropriate, cooperative    Diagnostic Data:     Component      Latest Ref Rng 4/18/2023 9/27/2023   Sodium      134 - 144 mmol/L 139  140    Potassium, Bld      3.5 - 5.2 mmol/L 4.4  4.1    Chloride      96 - 106 mmol/L 108  107 (H)    CO2      20 - 29 mmol/L 23  20    BUN      8 - 27 mg/dL 26 (H)  18    Creatinine      0.57 - 1.00 mg/dL 1.1  0.96    Glucose      70 - 99 mg/dL 82  83    Calcium      8.7 - 10.3 mg/dL 9.1  9.5    eGFR      >59 mL/min/1.73 >60.0  67      Component      Latest Ref Rng 4/13/2023   Hemoglobin A1C      <5.7 % 4.4      Component      Latest Ref Rng 4/13/2023 4/18/2023   WBC      3.80 - 10.50 K/uL 7.12  6.95    Hemoglobin      11.8 - 15.7 g/dL 10.6 (L)  10.1 (L)    Hematocrit      35.0 - 45.0 %  34.3 (L)  33.0 (L)    MCV      83.0 - 98.0 fL 87.7  90.4    Platelets      150 - 369 K/uL 267  256        Component      Latest Ref Rn 4/14/2023   Triglycerides      30 - 149 mg/dL 44    Cholesterol      <=200 mg/dL 194    HDL      >=55 mg/dL 49 (L)    LDL Calculated      <=100 mg/dL 136 (H)    Non-HDL, Calculated      mg/dL 145    RISK      <=5.0  4.0       Component      Latest Ref Rn 4/13/2023   High Sens Troponin I      <15.0 pg/mL 23.0 (H)       Component      Latest Ref Rn 4/14/2023   Ferritin      11 - 250 ng/mL 75      Component      Latest Ref Rn 4/13/2023   TSH      0.34 - 5.60 mIU/L 4.71      Component      Latest Ref [facility] 4/14/2023   Iron      35 - 150 ug/dL 29 (L)    TIBC      270 - 460 ug/dL 245 (L)    UIBC      180 - 360 ug/dL 216    Iron Saturation      15 - 45 % 12 (L)        Component      Latest Ref Rn 6/27/2022 4/13/2023   BNP      <=100 pg/mL 111 (H)  105 (H)                                     ---    ECG (2/1/2024, personally reviewed): Sinus Tachycardia (HR: 104 bpm), ANA, poor R-wave progression, non-specific T-wave abnormality, low voltage  ---    CTA Chest PE (10/4/2023):  IMPRESSION:  1. No evidence for filling defect or vessel cutoff to suggest pulmonary  embolism.  Enlargement of the pulmonary arteries compatible with pulmonary  arterial hypertension.  2. Changes throughout the lungs as described above which can be seen with  systemic sclerosis/scleroderma.  Underlying pneumonia the lung bases cannot be  entirely excluded in the proper clinical setting.  3.  Additional stable findings as described above.        VQ (9/12/2023):  IMPRESSION:  Intermediate to high probability of pulmonary embolic disease.     6MWT (7/25/2023, on Sildenafil 20 mg TID):        TTE (6/21/2023, personally reviewed):  1. Normal left ventricular cavity size and mild concentric left ventricular hypertrophy. Normal systolic function. EF: 60%. No regional wall motion abnormalities. Grade I diastolic dysfunction.    2. Aortic valve cusps not well visualized. Trace aortic regurgitation. Aortic valve and root sclerosis.  3. Thickened mitral valve leaflets. Mild mitral annular calcification. Trace mitral regurgitation. Mildly dilated left atrial size.   4. Normal right ventricular structure and function. Mild tricuspid valve regurgitation with estimated RVSP: 55 mmHg (assuming right atrial pressure of 3 mmHg). Normal right atrial size. Pulmonic valve not well visualized. Trace pulmonic valve regurgitation. Pulsed wave Doppler of the RVOT systolic profile show decreased acceleration times ( msec) and late systolic notching consistent with elevated PVR.   5. IVC size is normal with > 50% inspiratory collapse consistent with normal right atrial pressure. Small pericardial effusion without echocardiographic evidence of tamponade.  6. Compared to previous study from 4/14/2023, estimated right ventricular systolic pressure is higher.        TTE (4/14/2023, personally reviewed):   • The left ventricular cavity size is normal with mild left ventricular hypertrophy and preserved systolic function. Estimated EF 65%. No regional wall motion abnormalities. Grade I diastolic dysfunction.     • The aortic valve is tricuspid. Aortic valve sclerosis. Trace aortic regurgitation.      • The visualized portions of the aortic root and ascending aorta are normal in size.     • The mitral valve is mildly thickened. Mild mitral annular calcification. Trace mitral regurgitation.       • The left atrium is severely dilated.      • The right ventricle is normal in size with preserved systolic function     • The pulmonic valve is not well seen.     • The tricuspid valve is normal in appearance. mild tricuspid regurgitation with an estimated RVSP of 34 mmHg.       • Right atrium is normal in size.     • The IVC is small and collapses > 50% with inspiration consistent with normal right atrial pressures.     • Small pericardial effusion, mostly  posterior.       • Compared to previous echocardiogram from 8/22/2022, there is no significant change        TTE (11/28/2022, personally reviewed):   • Normal right- and mildly elevated left-sided filling pressures (RA: 3 mmHg, PCWP: 13 mmHg)  • Elevated pulmonary artery pressures (60/22(35 mmHg)) in the setting of PCWP: 13 mmHg.   • Normal cardiac output and index (CO: 5.1 L/min, CI: 3.2 L/min/m2)  • PVR: 4.3 Wood units. SVR: 1840 dynes/sec/cm5      TTE (8/22/2022, personally reviewed):  · Normal-sized LV. Normal LV systolic function. Estimated EF 55- 60%. Wall motion appears grossly normal. Mild concentric left ventricular hypertrophy. Grade I LV diastolic dysfunction.  · Pulmonic valve not well visualized. Grossly normal pulmonic valve structure. Trace pulmonic valve regurgitation. No pulmonic valve stenosis.  · Aortic valve not well visualized. Aortic valve not well seen but likely trileaflet. Focal aortic valve calcification present. Sclerotic aortic valve leaflets. Mild aortic valve regurgitation. No aortic valve stenosis.  · RV not well visualized. Normal-sized RV. Normal RV systolic function.  · Normal-sized RA.  · There is a small loculated pericardial effusion anterior to the right atrium. No cardiac tamponade.  · Sclerotic mitral valve. Mitral valve calcification of the anterior leaflet present.  · Trace mitral valve regurgitation.  · No significant mitral valve stenosis.  · Normal-sized IVC. IVC demonstrates normal respiratory collapse.  · Tricuspid valve structure is grossly normal. Mild to moderate tricuspid valve regurgitation.  · Estimated RVSP = 50-55 mmHg.  · Mildly dilated LA.  · No previous echocardiogram available for comparison.     TTE (4/21/2022, outside study):  This result has an attachment that is not available.   •  A complete transthoracic echocardiogram (including 2D, color flow   Doppler, spectral Doppler and M-mode imaging) was performed using the   standard protocol. The study  quality was adequate.   •  The left ventricle is normal in size. There is moderately increased   wall thickness consistent with moderate concentric hypertrophy.   Endocardium is incompletely visualized, but no regional wall motion   abnormalities are noted in the visualized segments. Normal left   ventricular ejection fraction. The left ventricular ejection fraction by   visual estimate is 55% to 60%.   •  The right ventricle is normal in size. There is normal function of the   right ventricle.   •  The left atrium is normal in size. Left atrial volume is 58 mL.   •  The right atrial volume measures 52 mL. The right atrial volume index   measures 34 mL/m2.   •  There is trace mitral regurgitation. There is no mitral stenosis.   •  The aortic valve is trileaflet. There is mild aortic valve thickening.   There is no aortic stenosis. There is trace aortic regurgitation.   •  There is mild to moderate tricuspid regurgitation. Pulmonary artery   systolic pressure measures 52 mmHg. The pulmonary artery systolic pressure   is moderately elevated.   •  The aorta measures 3.2 cm at the sinus of Valsalva. The proximal   segment of the ascending aorta is mildly enlarged. The proximal segment of   the ascending aorta measures 3.4 cm. The proximal segment of the ascending   aorta measures 2.2 cm/m2, indexed for body surface area.   •  Trivial pericardial effusion present. There is no echocardiographic   evidence of cardiac tamponade.   •  The inferior vena cava is normal in size (diameter <21 mm) and   decreases >50% in size with inspiration, suggesting a normal right atrial   pressure of 3 mmHg (range 0-5 mm Hg).   •  Compared to the prior study of 3/13/2020, there are no significant   changes.        PFT (3/23/2022):      PFT (7/14/2021):      CT Chest (5/2/2021, outside study):  CLINICAL INFORMATION: Systemic sclerosis. Interstitial lung disease. Groundglass nodule     PROCEDURE: Unenhanced CT of the chest was performed using  thin section reconstructions. Images were obtained on inspiration and expiration.     COMPARISON: June and August 2020     FINDINGS:     LUNGS, PLEURA:     Groundglass opacities with reticulation with subpleural sparing in the lower lobes, without honeycombing or architectural distortion, unchanged.     Right upper lobe groundglass nodule, image 114 of series 3, 8 x 11 mm, previously 6 mm.     Expiratory images are of diagnostic quality and do not demonstrate evidence of clinically significant air trapping.     CARDIOVASCULAR, MEDIASTINUM, THYROID: Coronary calcifications. Trace pericardial effusion.     LYMPH NODES: Subcentimeter mediastinal lymph nodes, unchanged. Unchanged axillary lymph nodes.     SKELETON, CHEST WALL: No destructive bone lesion     UPPER ABDOMEN: Patulous esophagus. 3 mm right renal stone.       RHC (9/02/2020):  RA   8 mmHg   RV   40/5 mmHg   PA (mean)  44/16 (25) mmHg   PCWP   12 mmHg   CO   4.65 lpm (Thermodilution)   CI   2.65 lpm/m-squared   SVI    33 ml/beat/m-squared (lower limit normal 29)   PVR   2.8 mmHg/l/min (Wood units)   SVR   2064 dyne-sec-cm-5         PFT (3/18/2020):      TTE (3/13/2020, outside study):  · The study quality was good.   · The left ventricle is normal in size. Top normal left ventricular wall   thickness. There are no segmental wall motion abnormalities. The left   ventricular ejection fraction is 55-60% by visual estimate and 3D   echocardiography. Normal left ventricular ejection fraction.   · There is grade I (mild) LV diastolic dysfunction.   · The right ventricle is normal in size. There is normal function of the   right ventricle.   · The right atrium is mildly dilated.   · There is mild mitral valve anterior leaflet thickening. There is mild   mitral valve leaflet calcification. There is flattening of the mitral   leaflets without raphael prolapse. There is trace mitral regurgitation.   · The aortic valve is trileaflet. There is mild thickening of the aortic    valve. There is mild calcification of the aortic valve. There is no aortic   /stenosis. There is trace aortic regurgitation.   · There is mild tricuspid valve leaflet thickening. There is mild to   moderate tricuspid regurgitation. The pulmonary artery systolic pressure   is moderately elevated. Pulmonary artery systolic pressure measures 50   mmHg. There is mid-systolic notching of the RVOT VTI consistent with   elevated pulmonary vascular resistance.   · The inferior vena cava is normal in size (diameter <21 mm) and decreases   >50% in size with inspiration, suggesting a normal right atrial pressure   of 3 mmHg (range 0-5 mm Hg).   · Trivial loculated pericardial effusion present adjacent to the right   ventricle and adjacent to the right atrium.          Assessment / Plan:     1. Pulmonary Hypertension (WHO Group I, NYHA/WHO FC III): Occurring in the background of Systemic Scleroderma with ILD. August 2022 TTE showed normal RV size and function, but progressive exertional decline, worsened DLCO and resting tachycardia suggest there may be progression of her pulmonary vascular disease and limitation of cardiac output. This was proven with 11/2022 RHC showing mean PA 35 mmHg (with PCWP 13 mmHg) and PVR 4.3 Wood units. June 2023 TTE showed increase in RVSP.     She is euvolemic on exam today. She will continue Sildenafil 20 mg TID, and Macitentan 10 mg daily for combination therapy.     I have asked her to wear a ZIO patch due to palpitations which are occurring daily. I have asked her to repeat an echocardiogram and   6-minute walk test with her next visit.    2. HTN: Controlled. She is continued on Amlodipine.     3. Systemic Scleroderma / Raynaud's Disease: Per Rheumatology (Dr. Giraldo).      4. ILD: Per Pulmonology and Rheumatology (Dr. Giraldo). She has not tolerated trials of Mycophenolate. She has asked for Pulmonology recommendation, and I have given her a referral to Dr. Wasserman.     5. PE: She has had  "elevated probability on V/Q scans, but CTA Chest has consistently not shown evidence for acute or chronic thromboembolism. Apixaban has been discontinued.     Thank you for allowing me to participate in the care of your patient. I would like to see her in the office in 3 months with new Echocardiogram and 6MWT. I have asked her to labs in 1-2 months.     Please do not hesitate to contact me with any questions or issues.     Sincerely,     Timothy Arvizu, DO  Cardiology / Heart Failure / Pulmonary Hypertension    [email or web address]    [phone]    [facility]  [facility]  [address]   [place] [id number]      Counseling and coordination of care consisted of more than 50% of this complex medical evaluation; numerous diagnostic and management options were considered and testing results were reviewed and discussed with the patient as best as able, during this encounter; the patient has multiple medical problems and is on numerous medications; the patient has a high risk of complications due to their cardiac disease, and all these resulted in the assigned \"complex medical decision-making\" characterization and coding of this patient encounter.  I spent 40 minutes on this date of service performing the following activities: obtaining history, performing examination, entering orders, documenting, preparing for visit, obtaining / reviewing records, providing counseling and education and independently reviewing study/studies.    "

## 2024-02-01 NOTE — PATIENT INSTRUCTIONS
-Wear a 8 day heart monitor   -Continue current medications  -Referral to Dr. Wasserman (pulmonary)  -Continue current medications  -Limit dietary sodium to 2000 mg daily and fluid intake to 48-64 ounces.   -Get echocardiogram and 6 minute walk test at your next visit.   -Get blood work in the next month or two  -Follow up in 3 months  -Call with any questions or issues: [phone]

## 2024-02-19 ENCOUNTER — TELEPHONE (OUTPATIENT)
Dept: CARDIOLOGY | Facility: CLINIC | Age: 64
End: 2024-02-19
Payer: COMMERCIAL

## 2024-02-19 ENCOUNTER — TELEPHONE (OUTPATIENT)
Dept: SCHEDULING | Facility: CLINIC | Age: 64
End: 2024-02-19
Payer: COMMERCIAL

## 2024-02-19 NOTE — TELEPHONE ENCOUNTER
Test Results     Name of caller: Arielle Felix      Relationship to patient: self     Name of patient: Arielle Felix    Name of physician: Timothy Arvizu DO    Type of test: Holter monitor     Best contact number: [phone]

## 2024-02-21 ENCOUNTER — TELEPHONE (OUTPATIENT)
Dept: CARDIOLOGY | Facility: CLINIC | Age: 64
End: 2024-02-21
Payer: COMMERCIAL

## 2024-02-21 NOTE — TELEPHONE ENCOUNTER
Called and spoke to Arielle, informed her that her ZIO patch results were normal. Average HR in the 90s, she did have some extra beats from top chamber of the heart but this is not an abnormal finding. Nothing to do at this time.    She was thankful for the call.

## 2024-02-27 ENCOUNTER — TRANSCRIBE ORDERS (OUTPATIENT)
Dept: LAB | Facility: HOSPITAL | Age: 64
End: 2024-02-27

## 2024-02-27 ENCOUNTER — APPOINTMENT (OUTPATIENT)
Dept: LAB | Facility: HOSPITAL | Age: 64
End: 2024-02-27
Attending: PHYSICIAN ASSISTANT
Payer: COMMERCIAL

## 2024-02-27 DIAGNOSIS — J84.9 INTERSTITIAL PULMONARY DISEASE, UNSPECIFIED: ICD-10-CM

## 2024-02-27 DIAGNOSIS — J84.9 INTERSTITIAL PULMONARY DISEASE, UNSPECIFIED: Primary | ICD-10-CM

## 2024-02-27 DIAGNOSIS — I27.20 PULMONARY HYPERTENSION (CMS/HCC): ICD-10-CM

## 2024-02-27 LAB
ALBUMIN SERPL-MCNC: 3.9 G/DL (ref 3.5–5.7)
ALP SERPL-CCNC: 69 IU/L (ref 34–125)
ALT SERPL-CCNC: 6 IU/L (ref 7–52)
ANION GAP SERPL CALC-SCNC: 12 MEQ/L (ref 3–15)
AST SERPL-CCNC: 15 IU/L (ref 13–39)
BILIRUB SERPL-MCNC: 0.3 MG/DL (ref 0.3–1.2)
BNP SERPL-MCNC: 82 PG/ML
BUN SERPL-MCNC: 17 MG/DL (ref 7–25)
CALCIUM SERPL-MCNC: 9.9 MG/DL (ref 8.6–10.3)
CHLORIDE SERPL-SCNC: 106 MEQ/L (ref 98–107)
CO2 SERPL-SCNC: 21 MEQ/L (ref 21–31)
CREAT SERPL-MCNC: 1 MG/DL (ref 0.6–1.2)
EGFRCR SERPLBLD CKD-EPI 2021: >60 ML/MIN/1.73M*2
ERYTHROCYTE [DISTWIDTH] IN BLOOD BY AUTOMATED COUNT: 18.6 % (ref 11.7–14.4)
GLUCOSE SERPL-MCNC: 85 MG/DL (ref 70–99)
HCT VFR BLD AUTO: 35.1 % (ref 35–45)
HGB BLD-MCNC: 10.9 G/DL (ref 11.8–15.7)
MAGNESIUM SERPL-MCNC: 2.1 MG/DL (ref 1.8–2.5)
MCH RBC QN AUTO: 26.4 PG (ref 28–33.2)
MCHC RBC AUTO-ENTMCNC: 31.1 G/DL (ref 32.2–35.5)
MCV RBC AUTO: 85 FL (ref 83–98)
PDW BLD AUTO: 10.5 FL (ref 9.4–12.3)
PLATELET # BLD AUTO: 266 K/UL (ref 150–369)
POTASSIUM SERPL-SCNC: 4 MEQ/L (ref 3.5–5.1)
PROT SERPL-MCNC: 9.2 G/DL (ref 6–8.2)
RBC # BLD AUTO: 4.13 M/UL (ref 3.93–5.22)
SODIUM SERPL-SCNC: 139 MEQ/L (ref 136–145)
WBC # BLD AUTO: 8.16 K/UL (ref 3.8–10.5)

## 2024-02-27 PROCEDURE — 83880 ASSAY OF NATRIURETIC PEPTIDE: CPT

## 2024-02-27 PROCEDURE — 85027 COMPLETE CBC AUTOMATED: CPT

## 2024-02-27 PROCEDURE — 86480 TB TEST CELL IMMUN MEASURE: CPT

## 2024-02-27 PROCEDURE — 80053 COMPREHEN METABOLIC PANEL: CPT

## 2024-02-27 PROCEDURE — 83735 ASSAY OF MAGNESIUM: CPT

## 2024-02-27 PROCEDURE — 36415 COLL VENOUS BLD VENIPUNCTURE: CPT

## 2024-02-27 NOTE — PROGRESS NOTES
Trelegy added to med list (see result note). Actemra added to med list as well, no dose. Pt reported.

## 2024-02-28 LAB
M TB IFN-G BLD-IMP: NEGATIVE
MITOGEN-NIL: 4.79 IU/ML
NIL: 0.02 IU/ML
TB AG-NIL: 0 IU/ML
TB2 AG - NIL: 0 IU/ML

## 2024-03-03 ENCOUNTER — HOSPITAL ENCOUNTER (INPATIENT)
Facility: HOSPITAL | Age: 64
LOS: 13 days | Discharge: HOME HEALTH | DRG: 315 | End: 2024-03-16
Attending: EMERGENCY MEDICINE | Admitting: INTERNAL MEDICINE
Payer: COMMERCIAL

## 2024-03-03 ENCOUNTER — APPOINTMENT (EMERGENCY)
Dept: RADIOLOGY | Facility: HOSPITAL | Age: 64
DRG: 315 | End: 2024-03-03
Attending: EMERGENCY MEDICINE
Payer: COMMERCIAL

## 2024-03-03 DIAGNOSIS — I31.39 PERICARDIAL EFFUSION: Primary | ICD-10-CM

## 2024-03-03 DIAGNOSIS — R09.02 HYPOXIA: ICD-10-CM

## 2024-03-03 DIAGNOSIS — J84.9 INTERSTITIAL LUNG DISEASE (CMS/HCC): ICD-10-CM

## 2024-03-03 LAB
ALBUMIN SERPL-MCNC: 3.2 G/DL (ref 3.5–5.7)
ALBUMIN SERPL-MCNC: 3.8 G/DL (ref 3.5–5.7)
ALP SERPL-CCNC: 57 IU/L (ref 34–125)
ALP SERPL-CCNC: 71 IU/L (ref 34–125)
ALT SERPL-CCNC: 5 IU/L (ref 7–52)
ALT SERPL-CCNC: 7 IU/L (ref 7–52)
ANION GAP SERPL CALC-SCNC: 8 MEQ/L (ref 3–15)
ANION GAP SERPL CALC-SCNC: 9 MEQ/L (ref 3–15)
AST SERPL-CCNC: 11 IU/L (ref 13–39)
AST SERPL-CCNC: 15 IU/L (ref 13–39)
BACTERIA URNS QL MICRO: ABNORMAL /HPF
BASOPHILS # BLD: 0.05 K/UL (ref 0.01–0.1)
BASOPHILS NFR BLD: 0.7 %
BILIRUB SERPL-MCNC: 0.2 MG/DL (ref 0.3–1.2)
BILIRUB SERPL-MCNC: 0.3 MG/DL (ref 0.3–1.2)
BILIRUB UR QL STRIP.AUTO: NEGATIVE MG/DL
BNP SERPL-MCNC: 119 PG/ML
BUN SERPL-MCNC: 18 MG/DL (ref 7–25)
BUN SERPL-MCNC: 19 MG/DL (ref 7–25)
CALCIUM SERPL-MCNC: 9 MG/DL (ref 8.6–10.3)
CALCIUM SERPL-MCNC: 9.8 MG/DL (ref 8.6–10.3)
CHLORIDE SERPL-SCNC: 104 MEQ/L (ref 98–107)
CHLORIDE SERPL-SCNC: 104 MEQ/L (ref 98–107)
CLARITY UR REFRACT.AUTO: CLEAR
CO2 SERPL-SCNC: 23 MEQ/L (ref 21–31)
CO2 SERPL-SCNC: 24 MEQ/L (ref 21–31)
COLOR UR AUTO: COLORLESS
CREAT SERPL-MCNC: 0.9 MG/DL (ref 0.6–1.2)
CREAT SERPL-MCNC: 1 MG/DL (ref 0.6–1.2)
D DIMER PPP IA.FEU-MCNC: 0.68 UG/ML FEU (ref 0–0.5)
DIFFERENTIAL METHOD BLD: ABNORMAL
EGFRCR SERPLBLD CKD-EPI 2021: >60 ML/MIN/1.73M*2
EGFRCR SERPLBLD CKD-EPI 2021: >60 ML/MIN/1.73M*2
EOSINOPHIL # BLD: 0.13 K/UL (ref 0.04–0.36)
EOSINOPHIL NFR BLD: 1.7 %
ERYTHROCYTE [DISTWIDTH] IN BLOOD BY AUTOMATED COUNT: 18.8 % (ref 11.7–14.4)
ERYTHROCYTE [DISTWIDTH] IN BLOOD BY AUTOMATED COUNT: 18.8 % (ref 11.7–14.4)
FLUAV RNA SPEC QL NAA+PROBE: NEGATIVE
FLUBV RNA SPEC QL NAA+PROBE: NEGATIVE
GLUCOSE BLD-MCNC: 57 MG/DL (ref 70–99)
GLUCOSE BLD-MCNC: 62 MG/DL (ref 70–99)
GLUCOSE SERPL-MCNC: 219 MG/DL (ref 70–99)
GLUCOSE SERPL-MCNC: 94 MG/DL (ref 70–99)
GLUCOSE UR STRIP.AUTO-MCNC: NEGATIVE MG/DL
HCT VFR BLD AUTO: 29.4 % (ref 35–45)
HCT VFR BLD AUTO: 36.3 % (ref 35–45)
HGB BLD-MCNC: 11.1 G/DL (ref 11.8–15.7)
HGB BLD-MCNC: 9 G/DL (ref 11.8–15.7)
HGB UR QL STRIP.AUTO: NEGATIVE
HYALINE CASTS #/AREA URNS LPF: ABNORMAL /LPF
IMM GRANULOCYTES # BLD AUTO: 0.02 K/UL (ref 0–0.08)
IMM GRANULOCYTES NFR BLD AUTO: 0.3 %
KETONES UR STRIP.AUTO-MCNC: NEGATIVE MG/DL
LACTATE SERPL-SCNC: 1.1 MMOL/L (ref 0.4–2)
LEUKOCYTE ESTERASE UR QL STRIP.AUTO: ABNORMAL
LYMPHOCYTES # BLD: 1.41 K/UL (ref 1.2–3.5)
LYMPHOCYTES NFR BLD: 18.7 %
MCH RBC QN AUTO: 25.9 PG (ref 28–33.2)
MCH RBC QN AUTO: 26 PG (ref 28–33.2)
MCHC RBC AUTO-ENTMCNC: 30.6 G/DL (ref 32.2–35.5)
MCHC RBC AUTO-ENTMCNC: 30.6 G/DL (ref 32.2–35.5)
MCV RBC AUTO: 84.8 FL (ref 83–98)
MCV RBC AUTO: 85 FL (ref 83–98)
MONOCYTES # BLD: 0.37 K/UL (ref 0.28–0.8)
MONOCYTES NFR BLD: 4.9 %
NEUTROPHILS # BLD: 5.56 K/UL (ref 1.7–7)
NEUTS SEG NFR BLD: 73.7 %
NITRITE UR QL STRIP.AUTO: NEGATIVE
NRBC BLD-RTO: 0 %
PDW BLD AUTO: 10.5 FL (ref 9.4–12.3)
PDW BLD AUTO: 11 FL (ref 9.4–12.3)
PH UR STRIP.AUTO: 7.5 [PH]
PLATELET # BLD AUTO: 217 K/UL (ref 150–369)
PLATELET # BLD AUTO: 246 K/UL (ref 150–369)
POCT TEST: ABNORMAL
POCT TEST: ABNORMAL
POTASSIUM SERPL-SCNC: 4.3 MEQ/L (ref 3.5–5.1)
POTASSIUM SERPL-SCNC: 4.4 MEQ/L (ref 3.5–5.1)
PROT SERPL-MCNC: 8.1 G/DL (ref 6–8.2)
PROT SERPL-MCNC: 9.3 G/DL (ref 6–8.2)
PROT UR QL STRIP.AUTO: 1
RBC # BLD AUTO: 3.46 M/UL (ref 3.93–5.22)
RBC # BLD AUTO: 4.28 M/UL (ref 3.93–5.22)
RBC #/AREA URNS HPF: ABNORMAL /HPF
RSV RNA SPEC QL NAA+PROBE: NEGATIVE
SARS-COV-2 RNA RESP QL NAA+PROBE: NEGATIVE
SODIUM SERPL-SCNC: 135 MEQ/L (ref 136–145)
SODIUM SERPL-SCNC: 137 MEQ/L (ref 136–145)
SP GR UR REFRACT.AUTO: >1.035
SQUAMOUS URNS QL MICRO: ABNORMAL /HPF
TROPONIN I SERPL HS-MCNC: 12.2 PG/ML
TROPONIN I SERPL HS-MCNC: 12.6 PG/ML
UROBILINOGEN UR STRIP-ACNC: 0.2 EU/DL
WBC # BLD AUTO: 7.54 K/UL (ref 3.8–10.5)
WBC # BLD AUTO: 8.06 K/UL (ref 3.8–10.5)
WBC #/AREA URNS HPF: ABNORMAL /HPF

## 2024-03-03 PROCEDURE — 85025 COMPLETE CBC W/AUTO DIFF WBC: CPT | Performed by: EMERGENCY MEDICINE

## 2024-03-03 PROCEDURE — 84484 ASSAY OF TROPONIN QUANT: CPT | Performed by: EMERGENCY MEDICINE

## 2024-03-03 PROCEDURE — 63600105 HC IODINE BASED CONTRAST: Performed by: EMERGENCY MEDICINE

## 2024-03-03 PROCEDURE — 80053 COMPREHEN METABOLIC PANEL: CPT | Performed by: EMERGENCY MEDICINE

## 2024-03-03 PROCEDURE — 71046 X-RAY EXAM CHEST 2 VIEWS: CPT

## 2024-03-03 PROCEDURE — 73630 X-RAY EXAM OF FOOT: CPT | Mod: 50

## 2024-03-03 PROCEDURE — 84484 ASSAY OF TROPONIN QUANT: CPT | Mod: 91 | Performed by: EMERGENCY MEDICINE

## 2024-03-03 PROCEDURE — 83880 ASSAY OF NATRIURETIC PEPTIDE: CPT

## 2024-03-03 PROCEDURE — 87186 SC STD MICRODIL/AGAR DIL: CPT | Performed by: EMERGENCY MEDICINE

## 2024-03-03 PROCEDURE — G1004 CDSM NDSC: HCPCS

## 2024-03-03 PROCEDURE — 36415 COLL VENOUS BLD VENIPUNCTURE: CPT | Performed by: EMERGENCY MEDICINE

## 2024-03-03 PROCEDURE — 96375 TX/PRO/DX INJ NEW DRUG ADDON: CPT | Mod: 59

## 2024-03-03 PROCEDURE — 96374 THER/PROPH/DIAG INJ IV PUSH: CPT | Mod: 25

## 2024-03-03 PROCEDURE — 93005 ELECTROCARDIOGRAM TRACING: CPT | Performed by: EMERGENCY MEDICINE

## 2024-03-03 PROCEDURE — 81003 URINALYSIS AUTO W/O SCOPE: CPT | Performed by: EMERGENCY MEDICINE

## 2024-03-03 PROCEDURE — 99285 EMERGENCY DEPT VISIT HI MDM: CPT | Mod: 25

## 2024-03-03 PROCEDURE — 99223 1ST HOSP IP/OBS HIGH 75: CPT | Performed by: INTERNAL MEDICINE

## 2024-03-03 PROCEDURE — 63600000 HC DRUGS/DETAIL CODE: Mod: JZ | Performed by: EMERGENCY MEDICINE

## 2024-03-03 PROCEDURE — 83605 ASSAY OF LACTIC ACID: CPT | Performed by: EMERGENCY MEDICINE

## 2024-03-03 PROCEDURE — 80053 COMPREHEN METABOLIC PANEL: CPT

## 2024-03-03 PROCEDURE — 85027 COMPLETE CBC AUTOMATED: CPT

## 2024-03-03 PROCEDURE — 87637 SARSCOV2&INF A&B&RSV AMP PRB: CPT | Performed by: EMERGENCY MEDICINE

## 2024-03-03 PROCEDURE — 85379 FIBRIN DEGRADATION QUANT: CPT | Performed by: EMERGENCY MEDICINE

## 2024-03-03 PROCEDURE — 12000000 HC ROOM AND CARE MED/SURG

## 2024-03-03 PROCEDURE — 87086 URINE CULTURE/COLONY COUNT: CPT | Performed by: EMERGENCY MEDICINE

## 2024-03-03 PROCEDURE — 87040 BLOOD CULTURE FOR BACTERIA: CPT

## 2024-03-03 RX ORDER — AMLODIPINE BESYLATE 10 MG/1
10 TABLET ORAL EVERY MORNING
Status: DISCONTINUED | OUTPATIENT
Start: 2024-03-04 | End: 2024-03-04

## 2024-03-03 RX ORDER — DEXTROSE 40 %
15-30 GEL (GRAM) ORAL AS NEEDED
Status: DISCONTINUED | OUTPATIENT
Start: 2024-03-03 | End: 2024-03-16 | Stop reason: HOSPADM

## 2024-03-03 RX ORDER — IBUPROFEN 200 MG
16-32 TABLET ORAL AS NEEDED
Status: DISCONTINUED | OUTPATIENT
Start: 2024-03-03 | End: 2024-03-16 | Stop reason: HOSPADM

## 2024-03-03 RX ORDER — ENOXAPARIN SODIUM 100 MG/ML
40 INJECTION SUBCUTANEOUS
Status: DISCONTINUED | OUTPATIENT
Start: 2024-03-04 | End: 2024-03-03

## 2024-03-03 RX ORDER — IOPAMIDOL 755 MG/ML
70 INJECTION, SOLUTION INTRAVASCULAR
Status: COMPLETED | OUTPATIENT
Start: 2024-03-03 | End: 2024-03-03

## 2024-03-03 RX ORDER — LEVOTHYROXINE SODIUM 100 UG/1
100 TABLET ORAL DAILY
Status: DISCONTINUED | OUTPATIENT
Start: 2024-03-04 | End: 2024-03-16 | Stop reason: HOSPADM

## 2024-03-03 RX ORDER — DEXTROSE 50 % IN WATER (D50W) INTRAVENOUS SYRINGE
25 AS NEEDED
Status: DISCONTINUED | OUTPATIENT
Start: 2024-03-03 | End: 2024-03-16 | Stop reason: HOSPADM

## 2024-03-03 RX ORDER — MUPIROCIN 20 MG/G
1 OINTMENT TOPICAL DAILY
Status: DISCONTINUED | OUTPATIENT
Start: 2024-03-04 | End: 2024-03-16 | Stop reason: HOSPADM

## 2024-03-03 RX ORDER — AMLODIPINE BESYLATE 5 MG/1
5-10 TABLET ORAL NIGHTLY PRN
Status: DISCONTINUED | OUTPATIENT
Start: 2024-03-03 | End: 2024-03-16 | Stop reason: HOSPADM

## 2024-03-03 RX ORDER — DIPHENHYDRAMINE HCL 50 MG/ML
50 VIAL (ML) INJECTION ONCE
Status: COMPLETED | OUTPATIENT
Start: 2024-03-03 | End: 2024-03-03

## 2024-03-03 RX ORDER — SILDENAFIL CITRATE 20 MG/1
20 TABLET ORAL 3 TIMES DAILY
Status: DISCONTINUED | OUTPATIENT
Start: 2024-03-03 | End: 2024-03-16 | Stop reason: HOSPADM

## 2024-03-03 RX ADMIN — METHYLPREDNISOLONE SODIUM SUCCINATE 40 MG: 40 INJECTION, POWDER, FOR SOLUTION INTRAMUSCULAR; INTRAVENOUS at 17:52

## 2024-03-03 RX ADMIN — DIPHENHYDRAMINE HYDROCHLORIDE 50 MG: 50 INJECTION INTRAMUSCULAR; INTRAVENOUS at 17:49

## 2024-03-03 RX ADMIN — IOPAMIDOL 70 ML: 755 INJECTION, SOLUTION INTRAVENOUS at 19:24

## 2024-03-04 ENCOUNTER — APPOINTMENT (INPATIENT)
Dept: CARDIOLOGY | Facility: HOSPITAL | Age: 64
DRG: 315 | End: 2024-03-04
Payer: COMMERCIAL

## 2024-03-04 PROBLEM — E03.9 HYPOTHYROID: Status: ACTIVE | Noted: 2024-03-04

## 2024-03-04 LAB
ALBUMIN SERPL-MCNC: 3.2 G/DL (ref 3.5–5.7)
ALP SERPL-CCNC: 56 IU/L (ref 34–125)
ALT SERPL-CCNC: 6 IU/L (ref 7–52)
ANION GAP SERPL CALC-SCNC: 6 MEQ/L (ref 3–15)
AORTIC ROOT ANNULUS - M-MODE: 2.9 CM
AORTIC ROOT ANNULUS: 3.1 CM
AORTIC VALVE MEAN VELOCITY: 1.63 M/S
AORTIC VALVE VELOCITY TIME INTEGRAL: 50.5 CM
ASCENDING AORTA: 3.4 CM
AST SERPL-CCNC: 20 IU/L (ref 13–39)
ATRIAL RATE: 129
AV MEAN GRADIENT: 12 MMHG
AV PEAK GRADIENT: 21 MMHG
AV PEAK VELOCITY-S: 2.28 M/S
AV VALVE AREA INDEX: 0.89
AV VALVE AREA: 1.09 CM2
AV VELOCITY RATIO: 0.42
AVA (VTI): 1.33 CM2
BILIRUB SERPL-MCNC: 0.3 MG/DL (ref 0.3–1.2)
BSA FOR ECHO PROCEDURE: 1.49 M2
BUN SERPL-MCNC: 22 MG/DL (ref 7–25)
CALCIUM SERPL-MCNC: 8.7 MG/DL (ref 8.6–10.3)
CHLORIDE SERPL-SCNC: 107 MEQ/L (ref 98–107)
CO2 SERPL-SCNC: 22 MEQ/L (ref 21–31)
CREAT SERPL-MCNC: 0.8 MG/DL (ref 0.6–1.2)
CRP SERPL-MCNC: 8.9 MG/L
CUSP SEPARATION: 1.4 CM
DOP CALC LVOT STROKE VOLUME: 66.88 CM3
E WAVE DECELERATION TIME: 262 MS
E/A RATIO: 1.1
E/E' RATIO: 21.2
E/LAT E' RATIO: 16
EDV (BP): 63.9 CM3
EF (A4C): 51.1 %
EF A2C: 58.6 %
EGFRCR SERPLBLD CKD-EPI 2021: >60 ML/MIN/1.73M*2
EJECTION FRACTION: 55.6 %
ERYTHROCYTE [DISTWIDTH] IN BLOOD BY AUTOMATED COUNT: 18.9 % (ref 11.7–14.4)
ERYTHROCYTE [SEDIMENTATION RATE] IN BLOOD BY WESTERGREN METHOD: 119 MM/HR
ESV (BP): 28.4 CM3
FRACTIONAL SHORTENING: 37.84 %
GLUCOSE SERPL-MCNC: 118 MG/DL (ref 70–99)
HCT VFR BLD AUTO: 27.6 % (ref 35–45)
HEART RATE: 70 BPM
HGB BLD-MCNC: 8.5 G/DL (ref 11.8–15.7)
INTERVENTRICULAR SEPTUM: 1.26 CM
LA ESV (BP): 59.8 CM3
LA ESV INDEX (A2C): 40.34 CM3/M2
LA ESV INDEX (BP): 40.13 CM3/M2
LA/AORTA RATIO: 1.03
LAAS-AP2: 20.7 CM2
LAAS-AP4: 21.2 CM2
LAD 2D: 3.2 CM
LALD A4C: 5.81 CM
LALD A4C: 6.41 CM
LAV-S: 60.1 CM3
LEFT ATRIUM VOLUME INDEX: 37.92 CM3/M2
LEFT ATRIUM VOLUME: 56.5 CM3
LEFT INTERNAL DIMENSION IN SYSTOLE: 2.71 CM (ref 2.25–3.4)
LEFT VENTRICLE DIASTOLIC VOLUME INDEX: 41.41 CM3/M2
LEFT VENTRICLE DIASTOLIC VOLUME: 61.7 CM3
LEFT VENTRICLE SYSTOLIC VOLUME INDEX: 20.27 CM3/M2
LEFT VENTRICLE SYSTOLIC VOLUME: 30.2 CM3
LEFT VENTRICULAR INTERNAL DIMENSION IN DIASTOLE: 4.36 CM (ref 3.78–5.25)
LEFT VENTRICULAR POSTERIOR WALL IN END DIASTOLE: 1.15 CM (ref 0.48–0.9)
LV DIASTOLIC VOLUME: 62.6 CM3
LV ESV (APICAL 2 CHAMBER): 25.9 CM3
LVAD-AP2: 24.7 CM2
LVAD-AP4: 23.9 CM2
LVAS-AP2: 14.2 CM2
LVAS-AP4: 15.1 CM2
LVEDVI(A2C): 42.01 CM3/M2
LVEDVI(BP): 42.89 CM3/M2
LVESVI(A2C): 17.38 CM3/M2
LVESVI(BP): 19.06 CM3/M2
LVLD-AP2: 8.11 CM
LVLD-AP4: 7.63 CM
LVLS-AP2: 6.77 CM
LVLS-AP4: 6.5 CM
LVOT 2D: 2 CM
LVOT A: 3.14 CM2
LVOT MG: 3 MMHG
LVOT MV: 0.73 M/S
LVOT PEAK VELOCITY: 1.01 M/S
LVOT PG: 4 MMHG
LVOT STROKE VOLUME INDEX: 44.89 ML/M2
LVOT VTI: 21.3 CM
MCH RBC QN AUTO: 26.2 PG (ref 28–33.2)
MCHC RBC AUTO-ENTMCNC: 30.8 G/DL (ref 32.2–35.5)
MCV RBC AUTO: 84.9 FL (ref 83–98)
MLH CV ECHO AVA INDEX VELOCITY RATIO: 0.7
MRSA DNA SPEC QL NAA+PROBE: NEGATIVE
MV E'TISSUE VEL-LAT: 0.07 M/S
MV E'TISSUE VEL-MED: 0.05 M/S
MV PEAK A VEL: 0.99 M/S
MV PEAK E VEL: 1.08 M/S
MV STENOSIS PRESSURE HALF TIME: 77 MS
MV VALVE AREA P 1/2 METHOD: 2.86 CM2
P AXIS: 68
PDW BLD AUTO: 11.4 FL (ref 9.4–12.3)
PLATELET # BLD AUTO: 231 K/UL (ref 150–369)
POSTERIOR WALL: 1.15 CM
POTASSIUM SERPL-SCNC: 5.4 MEQ/L (ref 3.5–5.1)
PR INTERVAL: 140
PROT SERPL-MCNC: 7.7 G/DL (ref 6–8.2)
QRS DURATION: 78
QT INTERVAL: 316
QTC CALCULATION(BAZETT): 462
R AXIS: 46
RBC # BLD AUTO: 3.25 M/UL (ref 3.93–5.22)
RVOT VMAX: 0.67 M/S
RVOT VTI: 13.2 CM
SODIUM SERPL-SCNC: 135 MEQ/L (ref 136–145)
T WAVE AXIS: 83
TAPSE: 2.43 CM
TR MAX PG: 30.69 MMHG
TRICUSPID VALVE PEAK REGURGITATION VELOCITY: 2.77 M/S
VENTRICULAR RATE: 129
WBC # BLD AUTO: 4.26 K/UL (ref 3.8–10.5)
Z-SCORE OF LEFT VENTRICULAR DIMENSION IN END DIASTOLE: -0.21
Z-SCORE OF LEFT VENTRICULAR DIMENSION IN END SYSTOLE: -0.16
Z-SCORE OF LEFT VENTRICULAR POSTERIOR WALL IN END DIASTOLE: 2.92

## 2024-03-04 PROCEDURE — 87496 CYTOMEG DNA AMP PROBE: CPT

## 2024-03-04 PROCEDURE — 80053 COMPREHEN METABOLIC PANEL: CPT

## 2024-03-04 PROCEDURE — 93306 TTE W/DOPPLER COMPLETE: CPT | Mod: 26 | Performed by: INTERNAL MEDICINE

## 2024-03-04 PROCEDURE — 63700000 HC SELF-ADMINISTRABLE DRUG: Performed by: INTERNAL MEDICINE

## 2024-03-04 PROCEDURE — 36415 COLL VENOUS BLD VENIPUNCTURE: CPT

## 2024-03-04 PROCEDURE — 94640 AIRWAY INHALATION TREATMENT: CPT

## 2024-03-04 PROCEDURE — 200200 PR NO CHARGE: Performed by: INTERNAL MEDICINE

## 2024-03-04 PROCEDURE — 25000000 HC PHARMACY GENERAL: Performed by: STUDENT IN AN ORGANIZED HEALTH CARE EDUCATION/TRAINING PROGRAM

## 2024-03-04 PROCEDURE — 25000000 HC PHARMACY GENERAL

## 2024-03-04 PROCEDURE — 20000000 HC ROOM AND CARE ICU

## 2024-03-04 PROCEDURE — 93356 MYOCRD STRAIN IMG SPCKL TRCK: CPT

## 2024-03-04 PROCEDURE — 87498 ENTEROVIRUS PROBE&REVRS TRNS: CPT

## 2024-03-04 PROCEDURE — 85652 RBC SED RATE AUTOMATED: CPT | Performed by: STUDENT IN AN ORGANIZED HEALTH CARE EDUCATION/TRAINING PROGRAM

## 2024-03-04 PROCEDURE — 63700000 HC SELF-ADMINISTRABLE DRUG: Performed by: STUDENT IN AN ORGANIZED HEALTH CARE EDUCATION/TRAINING PROGRAM

## 2024-03-04 PROCEDURE — 86140 C-REACTIVE PROTEIN: CPT | Performed by: STUDENT IN AN ORGANIZED HEALTH CARE EDUCATION/TRAINING PROGRAM

## 2024-03-04 PROCEDURE — 63700000 HC SELF-ADMINISTRABLE DRUG

## 2024-03-04 PROCEDURE — 93010 ELECTROCARDIOGRAM REPORT: CPT | Performed by: INTERNAL MEDICINE

## 2024-03-04 PROCEDURE — 85027 COMPLETE CBC AUTOMATED: CPT

## 2024-03-04 PROCEDURE — 99291 CRITICAL CARE FIRST HOUR: CPT | Performed by: INTERNAL MEDICINE

## 2024-03-04 PROCEDURE — 87641 MR-STAPH DNA AMP PROBE: CPT

## 2024-03-04 RX ORDER — HYDROMORPHONE HYDROCHLORIDE 1 MG/ML
0.25 INJECTION, SOLUTION INTRAMUSCULAR; INTRAVENOUS; SUBCUTANEOUS EVERY 6 HOURS PRN
Status: DISCONTINUED | OUTPATIENT
Start: 2024-03-04 | End: 2024-03-04

## 2024-03-04 RX ORDER — COLCHICINE 0.6 MG/1
0.6 TABLET ORAL 2 TIMES DAILY
Status: DISCONTINUED | OUTPATIENT
Start: 2024-03-04 | End: 2024-03-08

## 2024-03-04 RX ORDER — LIDOCAINE 560 MG/1
1 PATCH PERCUTANEOUS; TOPICAL; TRANSDERMAL DAILY
Status: DISCONTINUED | OUTPATIENT
Start: 2024-03-05 | End: 2024-03-16 | Stop reason: HOSPADM

## 2024-03-04 RX ORDER — AMLODIPINE BESYLATE 10 MG/1
10 TABLET ORAL EVERY MORNING
Status: DISCONTINUED | OUTPATIENT
Start: 2024-03-04 | End: 2024-03-16 | Stop reason: HOSPADM

## 2024-03-04 RX ORDER — TRAMADOL HYDROCHLORIDE 50 MG/1
12.5 TABLET ORAL ONCE
Status: COMPLETED | OUTPATIENT
Start: 2024-03-04 | End: 2024-03-04

## 2024-03-04 RX ORDER — ACETAMINOPHEN 325 MG/1
650 TABLET ORAL EVERY 4 HOURS
Status: DISCONTINUED | OUTPATIENT
Start: 2024-03-04 | End: 2024-03-04

## 2024-03-04 RX ORDER — MECLIZINE HCL 25MG 25 MG/1
25 TABLET, CHEWABLE ORAL 3 TIMES DAILY PRN
Status: DISCONTINUED | OUTPATIENT
Start: 2024-03-04 | End: 2024-03-16 | Stop reason: HOSPADM

## 2024-03-04 RX ORDER — ACETAMINOPHEN 325 MG/1
650 TABLET ORAL EVERY 6 HOURS PRN
Status: DISCONTINUED | OUTPATIENT
Start: 2024-03-04 | End: 2024-03-09

## 2024-03-04 RX ADMIN — LEVOTHYROXINE SODIUM 100 MCG: 0.1 TABLET ORAL at 05:56

## 2024-03-04 RX ADMIN — SODIUM ZIRCONIUM CYCLOSILICATE 10 G: 10 POWDER, FOR SUSPENSION ORAL at 08:56

## 2024-03-04 RX ADMIN — ACETAMINOPHEN 650 MG: 325 TABLET ORAL at 18:34

## 2024-03-04 RX ADMIN — MECLIZINE HYDROCHLORIDE 25 MG: 25 TABLET, CHEWABLE ORAL at 13:24

## 2024-03-04 RX ADMIN — TRAMADOL HYDROCHLORIDE 12.5 MG: 50 TABLET ORAL at 22:51

## 2024-03-04 RX ADMIN — COLCHICINE 0.6 MG: 0.6 TABLET ORAL at 09:02

## 2024-03-04 RX ADMIN — MOMETASONE FUROATE AND FORMOTEROL FUMARATE DIHYDRATE 2 PUFF: 100; 5 AEROSOL RESPIRATORY (INHALATION) at 20:54

## 2024-03-04 RX ADMIN — MUPIROCIN 1 APPLICATION: 20 OINTMENT TOPICAL at 08:56

## 2024-03-04 RX ADMIN — SILDENAFIL 20 MG: 20 TABLET, FILM COATED ORAL at 20:00

## 2024-03-04 RX ADMIN — AMLODIPINE BESYLATE 10 MG: 10 TABLET ORAL at 14:04

## 2024-03-04 RX ADMIN — TIOTROPIUM BROMIDE INHALATION SPRAY 2 PUFF: 3.12 SPRAY, METERED RESPIRATORY (INHALATION) at 07:33

## 2024-03-04 RX ADMIN — SILDENAFIL 20 MG: 20 TABLET, FILM COATED ORAL at 13:24

## 2024-03-04 RX ADMIN — ACETAMINOPHEN 650 MG: 325 TABLET ORAL at 13:23

## 2024-03-04 RX ADMIN — SILDENAFIL 20 MG: 20 TABLET, FILM COATED ORAL at 00:44

## 2024-03-04 RX ADMIN — COLCHICINE 0.6 MG: 0.6 TABLET ORAL at 20:00

## 2024-03-04 RX ADMIN — ACETAMINOPHEN 650 MG: 325 TABLET ORAL at 03:27

## 2024-03-04 RX ADMIN — SILDENAFIL 20 MG: 20 TABLET, FILM COATED ORAL at 08:56

## 2024-03-04 RX ADMIN — ACETAMINOPHEN 650 MG: 325 TABLET ORAL at 08:56

## 2024-03-04 RX ADMIN — MOMETASONE FUROATE AND FORMOTEROL FUMARATE DIHYDRATE 2 PUFF: 100; 5 AEROSOL RESPIRATORY (INHALATION) at 07:31

## 2024-03-04 ASSESSMENT — COGNITIVE AND FUNCTIONAL STATUS - GENERAL
WALKING IN HOSPITAL ROOM: 2 - A LOT
STANDING UP FROM CHAIR USING ARMS: 2 - A LOT
DO YOU HAVE SERIOUS DIFFICULTY WALKING OR CLIMBING STAIRS: NO
MOVING TO AND FROM BED TO CHAIR: 2 - A LOT
CLIMB 3 TO 5 STEPS WITH RAILING: 2 - A LOT

## 2024-03-04 NOTE — PROGRESS NOTES
On 03/04/24 at 3:27 PM I personally called the patient's emergency contact:    Extended Emergency Contact Information  Primary Emergency Contact: mela frenchenedeliarashawn  Mobile Phone: [phone]  Relation: Daughter  Secondary Emergency Contact: Wagner Khan  Mobile Phone: [phone]  Relation: Daughter    Updates were given regarding the patient's status, and all questions were answered.     Patient's family understands that we will be updating them daily, but they are always welcome to call the hospital or the patient's room for more information.    Eldon Issa,

## 2024-03-04 NOTE — H&P
Internal Medicine  History & Physical        CHIEF COMPLAINT   Chest pain      HISTORY OF PRESENT ILLNESS      This is a 63 y.o. female with a past medical history of scleroderma, GERD, PE not on therapy, epistaxis and hemoptysis, pulmonary htn on mecitentan and sildenafil who presents with worsening SHOB and chest pain for the past 24hrs. The patient states she's been having worsening shob and CP for the last year but it acutely worsened in the last 24 hours. She states its pressure like, over her sternum, without radiation into her arms, and she does endorse periodic palpitations and night sweats with nause, as well as continued epistaxis and hemoptysis however these are not acutely associated with her pain today. She denies any time course, states the pain is worse with exertion, and denies any fever, chills, vomiting, diarrhea, ufd.     Per her recent notes, she has a PMH of scleroderma, ILD and PE with associated Pulmonary HTN, HTN, Raynaud's Disease, Cutaneous Systemic Scleroderma (dx 1998), ILD, PE (3/2023).      Echocardiogram from 4/21/2022: LVEF: 55-60%, mild aortic valve thickening, pulmonary artery systolic pressure: 52 mmHg and trivial pericardial effusion.     RHC 9/02/2020 showing top-normal right sided filling pressures with mild pulmonary hypertension, top-normal PVR and normal pulmonary capillary wedge pressure.      On 6/27/2022, [place] [facility] [place]. EKG: NSR without ischemic changes. hsTrop: 12->16, d-dimer: 0.56, CXR showed cardiomegaly and diffuse interstitial prominence.      echo, 8/22/2022: estimated RVSP = 50-55 mmHg, normal-sized LV with normal LV systolic function. Estimated EF 55- 60%. Wall motion grossly normal, mild concentric left ventricular hypertrophy, grade I LV diastolic dysfunction, normal RV size and function.     At her initial visit with [facility] on 10/11/2022, she reported shortness of breath with minimal activity most of the time,  starting 1 year prior w/  progressive worsening, PND and bendopnea, assoc ankle swelling, worsened over the past year and usually worse towards the end of the night, and swelling in her abdomen, daily palpations.    RHC done 11/28/2022: Normal right sided filling pressures. Mildly elevated left sided filling pressures. Precapillary pulmonary hypertension with mean PA 36 mmHg.     Hospitalized on 3/19/2023 at [facility] for PE diagnosed on V/Q scan, started on Apixaban. One week after her last office visit (4/6/2023), she presented to [facility] with chest pain, epistaxis and hemoptysis. Echocardiogram showed: left ventricular cavity size normal with mild left ventricular hypertrophy and preserved systolic function. Estimated EF 65%. Chest CTA showed no evidence of PE; Apixaban was discontinued.      Echocardiogram (6/2023): Normal left ventricular cavity size and mild concentric left ventricular hypertrophy. Normal systolic function. EF: 60%. Normal right ventricular structure and function. Mild tricuspid valve regurgitation with estimated RVSP: 55 mmHg. Compared to previous study from 4/14/2023, estimated right ventricular systolic pressure were higher.     At her last office visit on 7/25/2023, her Pulmonologist also started her on a new medication (Mycophenolate), and she had significant GI intolerance with this. It was trialed at various doses but ultimately stopped. At that visit, Dr. Arvizu asked her to check if she had Macitentan and if she did to start it and monitor her SpO2.     She underwent V/Q scan in September 2023, which showed intermediate-to-high probability of pulmonary embolic disease, as a result she completed CTA Chest PE which showed: no evidence for filling defect or vessel cutoff to suggest pulmonary embolism. Enlargement of the pulmonary arteries compatible with pulmonary arterial hypertension was seen.     she started Macitentan around August or September 2023. She states that she has not been checking her  "SpO2. She reports she did not notice any difference since starting it and sometimes thinks she may feel a little worse. She reports she has been feeling more shortness of breath at times with ADLs. She reports orthopnea and palpitations at night. She states that she is unsure if she has leg swelling, but reports her toes are cold and \"burning\". She reports unchanged chest pain, dizziness and lightheadedness.     Surghx; hernia repair, cardiac cath, colonoscopy     Fmhx; breast cancer in niece, heart disease unspecified in brother     sochx;  EtoH; no   Tobacco: no   Drugs: no     Meds: med rec in     All: asa, ibuprofen, iodine, pcn, sulfa, clindamycin, hctz, oxycodone, bactrim, latex     Code: Full code     In the ED:   VS; temperature 97.9, heart rate 132, respirations 24, blood pressure 138/67 satting 90% on room air with improvement to 96% on 1 L nasal cannula  Labs: Sodium 135, glucose 219, AST 11, ALT 5, albumin 3.2, bilirubin 0.2, lactate 1.1, troponin 12.6, white count 8.06, hemoglobin 9, platelets 217, D-dimer 0.68, UA trace leukocyte esterase, 1+ protein, 4-10 WBCs, rare squamous epithelial cells  Imaging:  Chest x-ray: Stable cardiomegaly with diffuse chronic interstitial processes revisualized in the lungs lower lung field prominence no definite superimposed infiltrate identified  X-ray foot bilateral: Pending official read  CTA PE with IV contrast: No evidence of PE, moderate to large pericardial effusion, lower lobe lung field predominant interstitial thickening with relative subpleural sparing consistent with chronic ILD unchanged from prior study, continued bilateral axillary mediastinal and bilateral hilar lymphadenopathy not definitely changed from prior, emphysema  Theraputics: CTA PE, 40 of Methylpred, 50 of Benadryl    PAST MEDICAL AND SURGICAL HISTORY      PMHx:  Past Medical History:   Diagnosis Date   • De Quervain's tenosynovitis    • Essential (primary) hypertension    • Follicular " carcinoma of thyroid (CMS/HCC)    • Gastro-esophageal reflux    • Hand ulceration (CMS/HCC)    • Hyperlipidemia, unspecified    • Interstitial lung disease (CMS/HCC)    • Leg ulcer (CMS/HCC)    • Lung nodule    • Lupus (CMS/HCC)    • Osteomyelitis of hand (CMS/HCC)    • Osteoporosis    • Pulmonary hypertension (CMS/HCC)    • Raynaud's disease     Severe   • Reflux esophagitis    • Scleroderma (CMS/HCC)    • Screening mammogram for breast cancer 05/04/2021    BI-RADS category: 1 - Negative (care everywhere @ [place])       PSHx:  Past Surgical History:   Procedure Laterality Date   • CARDIAC CATHETERIZATION     • COLONOSCOPY     • HERNIA REPAIR         PCP:   Sara Simmons MD    MEDICATIONS      Prior to Admission medications    Medication Sig Start Date End Date Taking? Authorizing Provider   amLODIPine (NORVASC) 5 mg tablet Take 10 mg by mouth every morning.    ProviderKera MD   amLODIPine (NORVASC) 5 mg tablet Take 5-10 mg by mouth nightly as needed (for high BP or Raynauds phenomenon of the fingers).    ProviderKera MD   biotin 1 mg tablet Take 1,000 mcg by mouth daily.    Romel Hidalgo MD   cholecalciferol, vitamin D3, 1,000 unit (25 mcg) tablet Take 1,000 Units by mouth daily.    Romel Hidalgo MD   cyanocobalamin, vitamin B-12, 1,000 mcg capsule Take 1,000 mcg by mouth daily. 2/14/20   Romel Hidalgo MD   docusate sodium (COLACE) 100 mg capsule Take 100 mg by mouth daily.    Romel Hidalgo MD   fluticasone-umeclidinium-vilanterol (TRELEGY ELLIPTA) 100-62.5-25 mcg blister with device powder for inhalation Inhale 1 puff daily.    Kera Hidalgo MD   levothyroxine (SYNTHROID) 100 mcg tablet Take 100 mcg by mouth daily. 7/6/20   Romel Hidalgo MD   macitentan (OPSUMIT) 10 mg tablet tablet Take 1 tablet (10 mg total) by mouth daily. 8/29/23   Radha Lal CRNP   meclizine (ANTIVERT) 25 mg tablet Take 25 mg by mouth daily as needed.     Provider, MD Romel   mupirocin (BACTROBAN) 2 % ointment Apply 1 application. topically daily. Behind ears    ProviderKera MD   predniSONE (DELTASONE) 50 mg tablet Take 1 tablet (50 mg total) by mouth every 6 (six) hours for 3 doses. Take 1 tab 13 hrs, 1 tab 7 hrs, and 1 tab 1 hr before exam. 23  Urmila Rasmussen PA C   sildenafiL, pulm.hypertension, (REVATIO) 20 mg tablet Take 1 tablet (20 mg total) by mouth 3 (three) times a day. 24   Timothy Arvizu,    tocilizumab (ACTEMRA IV) Infuse into a venous catheter.    Provider, Kera Urena MD       Home medications were personally reviewed.    ALLERGIES      Aspirin, Ibuprofen, Iodine, Iodine and iodide containing products, Penicillins, Sulfa (sulfonamide antibiotics), Clindamycin, Hydrochlorothiazide, Oxycodone, Sulfamethoxazole-trimethoprim, and Latex    FAMILY HISTORY      Family History   Problem Relation Age of Onset   • Heart disease Biological Brother         stent   • Breast cancer Niece    • Cervical cancer Neg Hx    • Uterine cancer Neg Hx    • Colon cancer Neg Hx    • Ovarian cancer Neg Hx        SOCIAL HISTORY      Social History     Socioeconomic History   • Marital status:    Tobacco Use   • Smoking status: Former     Types: Cigarettes     Quit date: 9/15/2005     Years since quittin.4   • Smokeless tobacco: Never   • Tobacco comments:     Would smoke 1/2 of 1/2 PPD, quit in    Vaping Use   • Vaping Use: Never used   Substance and Sexual Activity   • Alcohol use: Never   • Drug use: Never   • Sexual activity: Not Currently     Birth control/protection: Post-menopausal     Social Determinants of Health     Food Insecurity: No Food Insecurity (3/3/2024)    Hunger Vital Sign    • Worried About Running Out of Food in the Last Year: Never true    • Ran Out of Food in the Last Year: Never true   Transportation Needs: No Transportation Needs (2023)    PRAPARE - Transportation    • Lack of Transportation  (Medical): No    • Lack of Transportation (Non-Medical): No   Housing Stability: Unknown (4/14/2023)    Housing Stability Vital Sign    • Unable to Pay for Housing in the Last Year: No    • Unstable Housing in the Last Year: No       REVIEW OF SYSTEMS      Review of Systems  See above   PHYSICAL EXAMINATION      Temp:  [36.6 °C (97.9 °F)] 36.6 °C (97.9 °F)  Heart Rate:  [] 85  Resp:  [20-31] 31  BP: (135-163)/(60-74) 147/68  Body mass index is 17.08 kg/m².    Physical Exam  Vitals and nursing note reviewed.   Constitutional:       General: She is not in acute distress.     Appearance: She is ill-appearing. She is not toxic-appearing or diaphoretic.   HENT:      Head: Normocephalic and atraumatic.   Eyes:      General: No scleral icterus.        Right eye: No discharge.         Left eye: No discharge.      Extraocular Movements: Extraocular movements intact.      Conjunctiva/sclera: Conjunctivae normal.   Cardiovascular:      Rate and Rhythm: Normal rate and regular rhythm.      Heart sounds: Normal heart sounds. No murmur heard.     No friction rub. No gallop.   Pulmonary:      Effort: No respiratory distress.      Breath sounds: No stridor. No wheezing, rhonchi or rales.      Comments: Dry crackles in bilateral bases   Chest:      Chest wall: No tenderness.   Abdominal:      General: Bowel sounds are normal. There is no distension.      Palpations: Abdomen is soft. There is no mass.      Tenderness: There is no abdominal tenderness. There is no guarding or rebound.      Hernia: No hernia is present.   Musculoskeletal:         General: Deformity present.      Right lower leg: No edema.      Left lower leg: No edema.   Skin:     General: Skin is warm and dry.      Findings: Lesion present.      Comments: Multiple places of vitiligo noted    Tight, shiny skin on hands with associated nodules and amputations     Chronic LE nonhealing ulcers    Neurological:      General: No focal deficit present.      Mental  Status: She is alert and oriented to person, place, and time. Mental status is at baseline.         LABS / IMAGING / EKG        Labs:  I have reviewed the patient's pertinent labs.  Significant abnormals are listed above .    Microbiology Data personally reviewed:  Microbiology Results     Procedure Component Value Units Date/Time    SARS-COV-2 (COVID-19)/ FLU A/B, AND RSV, PCR Nasopharynx [[id number]]  (Normal) Collected: 03/03/24 1515    Specimen: Nasopharyngeal Swab from Nasopharynx Updated: 03/03/24 1708     SARS-CoV-2 (COVID-19) Negative     Influenza A Negative     Influenza B Negative     Respiratory Syncytial Virus Negative    Narrative:      Testing performed using real-time PCR for detection of COVID-19. EUA approved validation studies performed on site.           Imaging personally reviewed(does not include unread studies):  CT ANGIOGRAPHY CHEST PULMONARY EMBOLISM WITH IV CONTRAST    Result Date: 3/3/2024  IMPRESSION: 1.  No evidence of acute pulmonary embolism to the level of the segmental branches. 2.  Moderate to large pericardial effusion measuring higher than simple fluid density. 3.  Lower lobe lung field predominant interstitial thickening with some relative subpleural sparing, consistent with a chronic interstitial lung disease, unchanged from the prior study. 4.  Continued bilateral axillary, mediastinal, and bilateral hilar lymphadenopathy, not definitely changed from the prior study, possibly related to the patient's sarcoid. 5.  Emphysema.     X-RAY CHEST 2 VIEWS    Result Date: 3/3/2024  IMPRESSION: 1.  Stable cardiomegaly. 2.  Diffuse chronic interstitial process revisualized in the lungs with a lower lung field predominance, not definitely changed. No definite superimposed infiltrate identified.       ECG/Telemetry  I have independently reviewed the ECG. Significant findings include sinus tach .    ASSESSMENT AND PLAN           Pulmonary hypertension (CMS/HCC)  Assessment & Plan  Patient has  a pMH of pulmonary htn, scleroderma, CTEPh and precapillary pHTN   Sees Dr. Arvizu   Multiple echos and RHC and LHC   Endorses continued worsening SHOB since 10/2022 with BOWSER, palpitations, bendopnea   Most recent echo 6/2023 showed EF 60%, mild TR, RVSP 55, increased RV systolic pressures compared to April   Had a PE in march 2023, was on elequis however had hemoptysis and it was discontinued   VQ in sept 2023 showed mod-high prob of PE disease and CTA PE showed no acute filling defect but enlargmeent of PA cw PHTN    recently started mecitentan aug/sept 2023 wo much subjective change     Ddx: multifactorial phtn scleroderma ILD vs. CTEPH vs. Pericardial effusion vs. Combo thereof     Impression: 64yo lady with PMH of raynauds, phtn, cutaneous scleroderma, ILD, CTEPh pw worsening SHOB x 24 hours with new pericardial effusion seen on CT. Prior echos have shown increasing RVSP and worsening phtn, for which she is on sildenafil, mecitentan.     - continue sildenafil   - continue mecitentan   - FU cards cs   - Fu TTE   - closely monitor BP in ICU   - SCDs for DVT given hemoptysis            * Pericardial effusion  Assessment & Plan  Patient has a PMH of phtn, scleroderma, CTEPH   pw increasing chest pressure x 1 day, without radiation into arms  Endorses continued GERD sxs however no new NVD   Admits to some diaphoresis at night and difficulty breathing worsening x 1d   No other assoc sxs   CT shows pericardial effusion   US pending   BP has been stable 130s sbp     Trop 12.9   TB panel negative 2/27     Ddx: autoimmune vs. Myocardial injury vs. Bacterial infection vs. Viral vs. Malignant     Impression: 64yo lady with multifactorial phtn and scerloderma pw worsening shob and cp has new pericardial effusion on imaging. She denies any other sxs including, fever, sweats, chills, vomiting, diarrhea, however endorses continued SHOB, BOWSER, hemoptysis and epistaxis, GERD sxs, night sweats, weight loss     - admit to ICU  for close monitoring  - CMV qual   - holding BP meds for now; slowly restart as able   - TB panel 2/27 negative   - coxsackie FU qual   - TTE   - CBC daily  - CMP daily   - scds for dvt ppx given hemooptysis   - plan for pericardial effusion if needed wit cytology, viral, afb, cultures, and autoimmune eval   - FU blood cultures   - FU cards   - VS frequently   - NPO for now; give diet when able     History of pulmonary embolism  Assessment & Plan  Patient has a PMH of phtn and PE tx w/ apixiban however cb epistaxis and hemoptysis   Stopped elequis in June 2023   pw worsening shob and new pericardial effusion   Continues to endorse hemoptysis and epistaxis worsening over last few days     - scds for now for dvt ppx   - FU tte     Interstitial lung disease (CMS/HCC)  Assessment & Plan  Patient has a PMH of ILD, CTEPH and phtn on sildenafil, mecetentan, and scleroderma   Has been discussing Tocilizumab with rheumatology but hasn't started it yet   Continue pain meds as able     Impression: confirmed ILD and precap phtn 2/2 scleroderma, pw worsening SHOB, cp, and new pericardial effusion     - continue home meds   - currently holding amlodipine iso new pericardial effusion   - FU TTE   - restart as able   - continue phtn meds as able   - asked family to bring in mecitentan for patient; otherwise may evaluate cards office for samples       Open wound of right foot  Assessment & Plan  Patient has multiple wounds noted on her feet bilaterally   Wound images from admission in the media tab   Is on CCB at home   States wounds have difficulty healing  Also now with poor nutritional status iso scleroderma and ILD     Ddx: nonhealing ulcers 2/2 autoimmune disease vs. 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 Has chronic nonhealing ulcers to her feet, most likely secondary to her  scleroderma and poor nutritional status     - holding CCB for now iso pericardial effusion   - will slowly restart as able   - wound ostomy consult   - consider pain management   - nutrition consult     Hypothyroid  Assessment & Plan  Patient has a PMH of hypothyroidism 2/2 thyroidectomy for follicular carcinoma of the thyroid       - continue home thyroid medications     Acute on chronic heart failure with preserved ejection fraction (CMS/HCC)  Assessment & Plan  Patient has a PMH of HFPEF with EF 65%   Endorsing worsening HSOB, denies any ANGEL LUIS, UFD or medication complaince difficulty   CT shows pericardial effusion without pleural effusions or pulmonary edema   Exam negative for ANGEL LUIS, crackles     Impression: 64 yo lady with PMH of scleroderma and PE, phtn on mecitentan and sildenafil pw worseing BOWSER and shob with new pericardial effusion on ct imaging, negative for pulmonary edema or other s/sxs of CHF exacerbation. Unlikely this is a CHF exacerbation.     - continue to monitor   - continue home medications        VTE Assessment: [place]    VTE Prophylaxis: Current anticoagulants:    •None      Palliative Care Screen:    Code Status: Full Code  Estimated discharge date: 3/7/2024       ATTENDING DOCUMENTATION  ALSO SEE ATTENDING ATTESTATION SECTION OF NOTE

## 2024-03-04 NOTE — PROGRESS NOTES
"     Internal Medicine  ICU/CCU Daily Progress Note        SUBJECTIVE   This is a 63 y.o. year-old female admitted on 3/3/2024 with Pericardial effusion [I31.39]  Interstitial lung disease (CMS/HCC) [J84.9]  Hypoxia [R09.02].    63 y.o female PMHx of Scleraderma, ILD, hx of PE 3/2023 and pulmonary htn on Macitentan and sildenafil  Presented with worsening SOB and chest pain.     RHC 1/2022:  Top-normal right sided filling pressure with mild pulmonary HTN, top- normal PVR and normal PCWP.     ED: Tachycardiac (132), blood pressure 138/67, Satt'ing 90% on ra, improving to 96% on 1L. D.Dimer 0.68. Urine analysis: trace leukocyte esteras, 1+proteins. CTA PE with IV contrast showed moderate to large pericardial effusionno evidence of PE.     Interval History: No acute events overnight. Pt reports continued sensation of  pressure \"elephant sitting on my chest\" with SOB, which is the same as when she was admitted. Pt able to form full sentences without increased work of breathing.  Pt also reports lightheadedness, nausea.      OBJECTIVE   Vital signs in last 24 hours:  Temp:  [36.3 °C (97.3 °F)-37.2 °C (98.9 °F)] 37.2 °C (98.9 °F)  Heart Rate:  [] 94  Resp:  [19-37] 21  BP: (124-163)/(58-74) 142/73  SpO2:  [90 %-100 %] 96 %  Oxygen Therapy: None (Room air)  O2 Delivery Method: Nasal cannula  O2 Flow Rate (L/min):  [1 L/min-3 L/min] 1 L/min  MAP: 84      Weight & I/Os:  Weights (last 5 days)     Date/Time Weight Drug Calculation Weight (Dosing Weight)    03/04/24 0733 47.6 kg (105 lb) --    03/04/24 0000 48 kg (105 lb 13.1 oz) 48 kg (105 lb 13.1 oz)    03/03/24 1506 45.4 kg (100 lb) --        No intake or output data in the 24 hours ending 03/04/24 0659        Respiratory:           Telemetry/EKG:  I have independently reviewed the telemetry. No events for the last 24 hours.     PHYSICAL EXAMINATION   Physical Exam     LINES, CATHETERS, DRAINS, AIRWAYS, & WOUNDS   Lines, drains, airways, & wounds:  Peripheral IV " "(Adult) 03/03/24 Left Antecubital (Active)   Number of days: 1        LABS & IMAGING   Labs:  I have reviewed the patient's labs to the time of note. No new clinical concern.    Results from last 7 days   Lab Units 03/04/24  0543 03/03/24 2245 03/03/24  1625   WBC K/uL 4.26 8.06 7.54   HEMOGLOBIN g/dL 8.5* 9.0* 11.1*   HEMATOCRIT % 27.6* 29.4* 36.3   PLATELETS K/uL 231 217 246       Results from last 7 days   Lab Units 03/04/24  0556 03/03/24 2245 03/03/24  1625   SODIUM mEQ/L 135* 135* 137   POTASSIUM mEQ/L 5.4* 4.3 4.4   CHLORIDE mEQ/L 107 104 104   CO2 mEQ/L 22 23 24   BUN mg/dL 22 18 19   CREATININE mg/dL 0.8 1.0 0.9   CALCIUM mg/dL 8.7 9.0 9.8   ALBUMIN g/dL 3.2* 3.2* 3.8   BILIRUBIN TOTAL mg/dL 0.3 0.2* 0.3   ALK PHOS IU/L 56 57 71   ALT IU/L 6* 5* 7   AST IU/L 20 11* 15   GLUCOSE mg/dL 118* 219* 94     Lab Results   Component Value Date    MG 2.1 02/27/2024    CA 9.5 09/27/2023     No results found for: \"PT\", \"INR\", \"PTT\"    Microbiology Results (last 3 days)     Procedure Component Value - Date/Time    MRSA Screen, Nares Only Nose [[id number]]  (Normal) Collected: 03/04/24 0027    Lab Status: Final result Specimen: Nasal Swab from Nose Updated: 03/04/24 0216     MRSA DNA, Nares Negative     Comment: No methicillin resistant Staphylococcus aureus (MRSA) detected.       SARS-COV-2 (COVID-19)/ FLU A/B, AND RSV, PCR Nasopharynx [[id number]]  (Normal) Collected: 03/03/24 1515    Lab Status: Final result Specimen: Nasopharyngeal Swab from Nasopharynx Updated: 03/03/24 1708     SARS-CoV-2 (COVID-19) Negative     Influenza A Negative     Influenza B Negative     Respiratory Syncytial Virus Negative    Narrative:      Testing performed using real-time PCR for detection of COVID-19. EUA approved validation studies performed on site.           Imaging personally reviewed (does not include unread studies):  X-RAY FOOT BILATERAL 3+VW    Result Date: 3/4/2024  CLINICAL HISTORY: Foot swelling and pain. COMMENT: 6 views of " the left foot were obtained. Comparison with a similar study from 6/27/2022. There is no fracture, dislocation or osseous destruction. No abnormal soft tissue calcifications are seen. There is normal alignment.     IMPRESSION: No osseous destruction to suggest osteomyelitis.     CT ANGIOGRAPHY CHEST PULMONARY EMBOLISM WITH IV CONTRAST    Result Date: 3/3/2024  CLINICAL HISTORY: Pulmonary embolism (PE) suspected, positive D-dimer Chest pain and shortness of breath, history of sarcoidosis TECHNIQUE:   Enhanced CT Angiogram examination of the pulmonary arteries/chest was performed from the lung apices through the adrenal glands including thin slices according to acute pulmonary embolism protocol. 3D MIP and/or volume rendered image reconstruction performed and reviewed. ADMINISTERED CONTRAST AND DOSE: 70mL of iopamidoL (ISOVUE-370) 370 mg iodine /mL (76 %) injection 70 mL No reported contrast reaction. CT DOSE:  One or more dose reduction techniques (e.g. automated exposure control, adjustment of the mA and/or kV according to patient size, use of iterative reconstruction technique) utilized for this examination. COMPARISON:  10/4/2023, 4/13/2023 COMMENT: VESSELS: There is no evidence of acute pulmonary embolism to the level of the segmental branches. The main pulmonary artery is normal in caliber. The thoracic aorta is normal in caliber, with atherosclerotic calcification. AIRWAYS: The central airways are patent. LUNGS AND PLEURA: There is paraseptal and centrilobular emphysematous changes, upper lobe predominant. There is lower lobe lung field predominant interstitial thickening with some relative subpleural sparing, consistent with a chronic interstitial lung disease, unchanged from the prior study. No definite superimposed acute process is identified. No pleural effusion. There is no pneumothorax. LOWER NECK: Visualized thyroid is unremarkable. ESOPHAGUS: Fluid filled. HEART AND MEDIASTINUM: There is stable  cardiomegaly. Coronary artery calcification is suggested. There is a moderate to large pericardial effusion measuring higher than simple fluid density. LYMPH NODES: Again noted is bilateral axillary, mediastinal, and bilateral hilar lymphadenopathy, not definitely changed from the prior study, possibly related to the patient's sarcoid. UPPER ABDOMEN: Limited visualization is within normal limits. CHEST WALL: Within normal limits. BONES: Mild degenerative changes.     IMPRESSION: 1.  No evidence of acute pulmonary embolism to the level of the segmental branches. 2.  Moderate to large pericardial effusion measuring higher than simple fluid density. 3.  Lower lobe lung field predominant interstitial thickening with some relative subpleural sparing, consistent with a chronic interstitial lung disease, unchanged from the prior study. 4.  Continued bilateral axillary, mediastinal, and bilateral hilar lymphadenopathy, not definitely changed from the prior study, possibly related to the patient's sarcoid. 5.  Emphysema.     X-RAY CHEST 2 VIEWS    Result Date: 3/3/2024  CLINICAL HISTORY:      Chest Pain/Arrhythmia COMMENT:  Frontal and lateral views of the chest are compared to 9/12/2023. Diffuse chronic interstitial process revisualized in the lungs with a lower lung field predominance, not definitely changed. No definite superimposed infiltrate identified. There is no pleural effusion. There is no pneumothorax. The cardiomediastinal silhouette appears stable , noting aortic calcification and cardiomegaly.    No acute osseous abnormality is seen.     IMPRESSION: 1.  Stable cardiomegaly. 2.  Diffuse chronic interstitial process revisualized in the lungs with a lower lung field predominance, not definitely changed. No definite superimposed infiltrate identified.      MEDS/DRIPS   Scheduled Meds:  • amLODIPine  10 mg oral q AM   • colchicine  0.6 mg oral BID   • levothyroxine  100 mcg oral Daily   • mometasone-formoterol  2  puff inhalation BID (8a, 8p)    And   • tiotropium bromide  2 puff inhalation Daily (8a)   • mupirocin  1 Application Topical Daily   • macitentan  10 mg oral Daily   • sildenafiL (pulm.hypertension)  20 mg oral TID     Continuous Infusions:  PRN Meds:.•  acetaminophen  •  [Provider Managed Hold] amLODIPine  •  glucose **OR** dextrose **OR** glucagon **OR** dextrose 50 % in water (D50)  •  meclizine     ASSESSMENT & PLAN   Interstitial lung disease (CMS/HCC)  Assessment & Plan  Patient has a PMH of ILD, CTEPH and phtn on sildenafil, mecetentan, and scleroderma   Has been discussing Tocilizumab with rheumatology but hasn't started it yet   Echo: EF 60%, No regional wall abnormalities, Grade II diastolic dysfunction  Continue pain meds as able     Impression: confirmed ILD and precap phtn 2/2 scleroderma, pw worsening SHOB, cp, and new pericardial effusion     - continue home meds   - restarting PTA amlodipine   - continue phtn meds as able   - asked family to bring in mecitentan for patient; otherwise may evaluate cards office for samples       Hypothyroid  Assessment & Plan  Patient has a PMH of hypothyroidism 2/2 thyroidectomy for follicular carcinoma of the thyroid       - continue home thyroid medications     Acute on chronic heart failure with preserved ejection fraction (CMS/HCC)  Assessment & Plan  Patient has a PMH of HFPEF with EF 65%   Endorsing worsening HSOB, denies any ANGEL LUIS, UFD or medication complaince difficulty   CT shows pericardial effusion without pleural effusions or pulmonary edema   Exam negative for ANGEL LUIS, crackles     Impression: 64 yo lady with PMH of scleroderma and PE, phtn on mecitentan and sildenafil pw worseing BOWSER and shob with new pericardial effusion on ct imaging, negative for pulmonary edema or other s/sxs of CHF exacerbation. Unlikely this is a CHF exacerbation.     - continue to monitor   - continue home medications     History of pulmonary embolism  Assessment & Plan  Patient has a  PMH of phtn and PE tx w/ apixiban however cb epistaxis and hemoptysis   Stopped elequis in June 2023   pw worsening shob and new pericardial effusion   Continues to endorse hemoptysis and epistaxis worsening over last few days     - scds for now for dvt ppx   - FU tte     Pulmonary hypertension (CMS/HCC)  Assessment & Plan  Patient has a pMH of pulmonary htn, scleroderma, CTEPh and precapillary pHTN   Sees Dr. Arvizu   Multiple echos and RHC and LHC   Endorses continued worsening SHOB since 10/2022 with BOWSER, palpitations, bendopnea   3/4: Echo: EF 60%, No regional wall abnormalities, Grade II diastolic dysfunction  Most recent echo 6/2023 showed EF 60%, mild TR, RVSP 55, increased RV systolic pressures compared to April   Had a PE in march 2023, was on elequis however had hemoptysis and it was discontinued   VQ in sept 2023 showed mod-high prob of PE disease and CTA PE showed no acute filling defect but enlargmeent of PA cw PHTN    recently started mecitentan aug/sept 2023 wo much subjective change     Ddx: multifactorial phtn scleroderma ILD vs. CTEPH vs. Pericardial effusion vs. Combo thereof     Impression: 62yo lady with PMH of raynauds, phtn, cutaneous scleroderma, ILD, CTEPh pw worsening SHOB x 24 hours with new pericardial effusion seen on CT. Prior echos have shown increasing RVSP and worsening phtn, for which she is on sildenafil, mecitentan.     - continue sildenafil   - continue mecitentan   - FU cards cs   - closely monitor BP in ICU   - SCDs for DVT given hemoptysis            Open wound of right foot  Assessment & Plan  Patient has multiple wounds noted on her feet bilaterally   Wound images from admission in the media tab   Is on CCB at home   States wounds have difficulty healing  Also now with poor nutritional status iso scleroderma and ILD     Ddx: nonhealing ulcers 2/2 autoimmune disease vs. Poor nutritional status     Impression: 62yo lady with scleroderma causing phtn, ild, chronic extremity  nonhealing ulcers sp mutliple amputations, raynauds pw shob and new pericardial effusion. Has chronic nonhealing ulcers to her feet, most likely secondary to her scleroderma and poor nutritional status     - holding CCB for now iso pericardial effusion   - will slowly restart as able   - wound ostomy consult   - consider pain management   - nutrition consult     * Pericardial effusion  Assessment & Plan  Ddx: autoimmune vs. Myocardial injury vs. Bacterial infection vs. Viral vs. Malignant   Patient has a PMH of phtn, scleroderma, CTEPH   pw increasing chest pressure x 1 day, without radiation into arms  Endorses continued GERD sxs however no new NVD  S/p Methylprednisolone x1 in ED   CT shows pericardial effusion   Echo: EF 60%, No regional wall abnormalities, Grade II diastolic dysfunction  ; Trop 12.9   YGH112 and CRP 8.9   MRSA Nares: Negative  TB panel negative 2/27       - Start Colchicine 0.6 mg BID  - consider low dose prednisone 0.2 to 0.5 mg daily as per Pharmacy (as indomethacin alternative, pt reports allerges to ASA and Ibuprofen)   - Cardiac Cath, if patient can tolerate  - CMV qual   - holding BP meds for now; slowly restart as able   - TB panel 2/27 negative   - coxsackie FU qual   - CBC daily  - CMP daily   - scds for dvt ppx given hemooptysis   - FU blood cultures   - FU cards   - VS frequently          VTE Assessment: Padua    VTE Prophylaxis: Current anticoagulants:    •None      Code Status: Full Code  Estimated discharge date: 3/8/2024       ICU CHECKLIST   Lines:   Peripheral IV (Adult) 03/03/24 Left Antecubital (Active)   Number of days: 1     Byrd: No     Sedation Vacation: No sedation    Physical Therapy: No     Nutrition: Regular diet    DVT Prophylaxis: SCD in place    GI Prophylaxis: No        ATTENDING DOCUMENTATION  ALSO SEE ATTENDING ATTESTATION SECTION OF NOTE

## 2024-03-04 NOTE — ASSESSMENT & PLAN NOTE
Patient has a PMH of phtn and PE tx w/ apixiban however cb epistaxis and hemoptysis   Stopped elequis in June 2023   pw worsening shob and new pericardial effusion   Continues to endorse hemoptysis and epistaxis worsening over last few days   Echo EF 60%, no regional wall motion abnormalities, grade II diastolic dysfunction    - scds for now for dvt ppx

## 2024-03-04 NOTE — CONSULTS
ALLERGY, ASTHMA, &  IMMUNOLOGY  Consult        REASON FOR CONSULTATION   Drug allergy     HISTORY OF PRESENT ILLNESS      Arielle Felix is a 63 y.o. female with hypertension, acid reflux, scleroderma, interstitial lung disease, pulmonary hypertension, history of pulmonary embolism, latex allergy, contrast allergy, and drug allergy who was admitted for pericardial effusion.  Allergy consulted for drug allergy.    She initially presented on March 3 with a 1 day history of acute onset shortness of breath and chest pain with associated pressure over her sternum and palpitations.  On presentation to the emergency room she was noted to be tachycardic and tachypneic.  CT scan showed a pericardial effusion.  She was admitted to the cardiothoracic ICU for further evaluation.  In the ICU she has been started on colchicine 0.6 mg twice daily.  Allergy has been consulted to evaluate for the possibility of using indomethacin given her history of NSAID allergies.  The patient is somewhat limited historian about her drug allergy history but she does note that the NSAID reaction seem to occur when she was in the hospital and she recalls being advised never to take any of these medications again.  She is not able to recall the history surrounding her other drug allergies, her contrast dye allergy, or her latex allergy.    PAST MEDICAL AND SURGICAL HISTORY      Past Medical History:   Diagnosis Date   • De Quervain's tenosynovitis    • Essential (primary) hypertension    • Follicular carcinoma of thyroid (CMS/HCC)    • Gastro-esophageal reflux    • Hand ulceration (CMS/HCC)    • Hyperlipidemia, unspecified    • Interstitial lung disease (CMS/HCC)    • Leg ulcer (CMS/HCC)    • Lung nodule    • Lupus (CMS/HCC)    • Osteomyelitis of hand (CMS/HCC)    • Osteoporosis    • Pulmonary hypertension (CMS/HCC)    • Raynaud's disease     Severe   • Reflux esophagitis    • Scleroderma (CMS/HCC)    • Screening mammogram for breast cancer  05/04/2021    BI-RADS category: 1 - Negative (care everywhere @ [place])       Past Surgical History:   Procedure Laterality Date   • CARDIAC CATHETERIZATION     • COLONOSCOPY     • HERNIA REPAIR         MEDICATIONS      Prior to Admission medications    Medication Sig Start Date End Date Taking? Authorizing Provider   amLODIPine (NORVASC) 5 mg tablet Take 10 mg by mouth every morning.    Kera Hidalgo MD   amLODIPine (NORVASC) 5 mg tablet Take 5-10 mg by mouth nightly as needed (for high BP or Raynauds phenomenon of the fingers).    Kera Hidalgo MD   biotin 1 mg tablet Take 1,000 mcg by mouth daily.    Romel Hidalgo MD   cholecalciferol, vitamin D3, 1,000 unit (25 mcg) tablet Take 1,000 Units by mouth daily.    Romel Hidalgo MD   cyanocobalamin, vitamin B-12, 1,000 mcg capsule Take 1,000 mcg by mouth daily. 2/14/20   Romel Hidalgo MD   docusate sodium (COLACE) 100 mg capsule Take 100 mg by mouth daily.    Romel Hidalgo MD   fluticasone-umeclidinium-vilanterol (TRELEGY ELLIPTA) 100-62.5-25 mcg blister with device powder for inhalation Inhale 1 puff daily.    Kera Hidalgo MD   levothyroxine (SYNTHROID) 100 mcg tablet Take 100 mcg by mouth daily. 7/6/20   Romel Hidalgo MD   macitentan (OPSUMIT) 10 mg tablet tablet Take 1 tablet (10 mg total) by mouth daily. 8/29/23   Radha Lal CRNP   meclizine (ANTIVERT) 25 mg tablet Take 25 mg by mouth daily as needed.    Romel Hidalgo MD   mupirocin (BACTROBAN) 2 % ointment Apply 1 application. topically daily. Behind ears    Kera Hidalgo MD   predniSONE (DELTASONE) 50 mg tablet Take 1 tablet (50 mg total) by mouth every 6 (six) hours for 3 doses. Take 1 tab 13 hrs, 1 tab 7 hrs, and 1 tab 1 hr before exam. 9/28/23 9/29/23  Urmila Rasmussen PA C   sildenafiL, pulm.hypertension, (REVATIO) 20 mg tablet Take 1 tablet (20 mg total) by mouth 3 (three) times a day. 1/4/24   Timothy Arvizu DO    tocilizumab (ACTEMRA IV) Infuse into a venous catheter.    Provider, Kera Urena MD       ALLERGIES      Aspirin, Ibuprofen, Iodine, Iodine and iodide containing products, Penicillins, Sulfa (sulfonamide antibiotics), Clindamycin, Hydrochlorothiazide, Oxycodone, Sulfamethoxazole-trimethoprim, and Latex    FAMILY HISTORY      Family History   Problem Relation Age of Onset   • Heart disease Biological Brother         stent   • Breast cancer Niece    • Cervical cancer Neg Hx    • Uterine cancer Neg Hx    • Colon cancer Neg Hx    • Ovarian cancer Neg Hx        SOCIAL HISTORY      Social History     Socioeconomic History   • Marital status:    Tobacco Use   • Smoking status: Former     Types: Cigarettes     Quit date: 9/15/2005     Years since quittin.4   • Smokeless tobacco: Never   • Tobacco comments:     Would smoke 1/2 of 1/2 PPD, quit in    Vaping Use   • Vaping Use: Never used   Substance and Sexual Activity   • Alcohol use: Never   • Drug use: Never   • Sexual activity: Not Currently     Birth control/protection: Post-menopausal     Social Determinants of Health     Food Insecurity: No Food Insecurity (3/3/2024)    Hunger Vital Sign    • Worried About Running Out of Food in the Last Year: Never true    • Ran Out of Food in the Last Year: Never true   Transportation Needs: No Transportation Needs (3/4/2024)    PRAPARE - Transportation    • Lack of Transportation (Medical): No    • Lack of Transportation (Non-Medical): No   Housing Stability: Low Risk  (3/4/2024)    Housing Stability Vital Sign    • Unable to Pay for Housing in the Last Year: No    • Number of Places Lived in the Last Year: 1    • Unstable Housing in the Last Year: No       REVIEW OF SYSTEMS      All other systems reviewed and negative except as noted in HPI    PHYSICAL EXAMINATION      Temp:  [36.3 °C (97.3 °F)-37.7 °C (99.9 °F)] 37.7 °C (99.9 °F)  Heart Rate:  [69-98] 93  Resp:  [19-37] 29  BP: (124-147)/(58-73) 124/60  Body  mass index is 16.95 kg/m².    GENERAL: Awake, Alert, No acute distress, Frail.  HENT: Normocephalic, Atraumatic, Normal hearing.  EYES: PERRL, Conjunctiva normal.  CARDIAC: S1, S2, Regular Rhythm.  PULMONARY: Clear to auscultation bilaterally, No wheezing.  GASTROINTESTINAL: Soft, No tenderness, Normal bowel sounds.  LYMPHADENOPATHY: No lymphadenopathy.  MUSCULOSKELETAL: No tenderness, No deformity.  INTEGUMENTARY: Warm, Intact, Vitiligo.  NEUROLOGIC: Awake, Alert, No gross deficit.  PSYCHIATRIC: Cooperative, Appropriate mood and affect.    LABS / IMAGING / EKG      Results from last 7 days   Lab Units 03/04/24  0556   SODIUM mEQ/L 135*   POTASSIUM mEQ/L 5.4*   CHLORIDE mEQ/L 107   CO2 mEQ/L 22   BUN mg/dL 22   CREATININE mg/dL 0.8   CALCIUM mg/dL 8.7   ALBUMIN g/dL 3.2*   BILIRUBIN TOTAL mg/dL 0.3   ALK PHOS IU/L 56   ALT IU/L 6*   AST IU/L 20   GLUCOSE mg/dL 118*       X-RAY FOOT BILATERAL 3+VW    Result Date: 3/4/2024  IMPRESSION: No osseous destruction to suggest osteomyelitis.     CT ANGIOGRAPHY CHEST PULMONARY EMBOLISM WITH IV CONTRAST    Result Date: 3/3/2024  IMPRESSION: 1.  No evidence of acute pulmonary embolism to the level of the segmental branches. 2.  Moderate to large pericardial effusion measuring higher than simple fluid density. 3.  Lower lobe lung field predominant interstitial thickening with some relative subpleural sparing, consistent with a chronic interstitial lung disease, unchanged from the prior study. 4.  Continued bilateral axillary, mediastinal, and bilateral hilar lymphadenopathy, not definitely changed from the prior study, possibly related to the patient's sarcoid. 5.  Emphysema.     X-RAY CHEST 2 VIEWS    Result Date: 3/3/2024  IMPRESSION: 1.  Stable cardiomegaly. 2.  Diffuse chronic interstitial process revisualized in the lungs with a lower lung field predominance, not definitely changed. No definite superimposed infiltrate identified.       ASSESSMENT AND PLAN           Arielle  A Isidro is a 63 y.o. female with hypertension, acid reflux, scleroderma, interstitial lung disease, pulmonary hypertension, history of pulmonary embolism, latex allergy, contrast allergy, and drug allergy who was admitted for pericardial effusion.  Allergy consulted for drug allergy.    Pericardial effusion  She presented with a 1 day history of shortness of breath and chest pain and was found to have a pericardial effusion on her CT scan.  She was admitted to the cardiothoracic ICU.  Allergy is being consulted about the possibility of using indomethacin.  Unfortunately, given the patient has had reactions to multiple nonsteroidal anti-inflammatory drugs the risk of cross-reactivity would be high and it is likely not safe to directly administer indomethacin.  If this medication were to be required may have to consider attempting a desensitization however there is limited literature about such a protocol.  Using alternative Olvera 2 inhibitor such as celecoxib may not work either given that the patient has an allergy to sulfonamide medications.  As a result, would recommend trying to treat the patient with colchicine and corticosteroids and if these do not work adequately the benefits may outweigh the risks of attempting a desensitization to indomethacin at that time.  Will continue to be available to the primary team should this be required in the future.    Latex allergy  The use of latex should be minimized given that causes a rash.    Contrast allergy  She should receive a prep prior to any contrast administration.    Drug allergy  She does not recall the circumstances around the labeling of these agents which need to be avoided:  Aspirin  Ibuprofen  Penicillin  Sulfonamide antibiotics  Clindamycin  Hydrochlorothiazide  Oxycodone          Thank you for allowing us to partake in the care of this patient, please do not hesitate to call with any questions.    Dago Lloyd MD  Pager #[phone]  [facility]  Dr. Daniel Damon (#[id number]) & Dr. Dago Lloyd (#[phone])

## 2024-03-04 NOTE — PROGRESS NOTES
"Arielle GUAJARDO [facility]   [id number]    Your doctor has referred you for a CT examination that requires the injection of an iodinated contrast material into your bloodstream. This iodinated contrast material, sometimes referred to as \"x-ray dye\" allows for better interpretation of the x-ray films or CT images and results in a more accurate interpretation of the examination.     Without the use of iodinated contrast (x-ray dye), the examination may be less informative and result in a suboptimal examination, and possibly a delay in diagnosis and, if needed, treatment.     The iodinated contrast material is given through a small needle or catheter placed into a vein, usually on the inside of the elbow, on the back of hand, or in a vein in the foot or lower leg.    The most common, though still rare, potential reaction to an intravenous contrast injection is an allergic-like reaction. Most allergic-like reactions are mild, though a small subset of people can have more severe reactions. Mild reactions include mild / scattered hives, itching, scratchy throat, sneezing and nasal congestion. More severe reactions include facial swelling, severe difficulty breathing, wheezing and anaphylactic shock. Those with a prior history allergic-like reaction to the same class of contrast media (such as CT contrast or MRI contrast) are at the highest risk for an allergic reaction.     If you believe you had an allergic reaction to contrast in the past, please let our staff know. We can determine if this increases your risk for a future reaction and provide steps to decrease the risk. This may delay your examination, but it decreases the risk of having a new and possibly more severe reaction to the contrast injection.    People with a history of prior allergic reactions to other substances (such as unrelated medications and food) and patients with a history of asthma have slightly increased risk for an allergic reaction from contrast " material when compared with the general population, but do not require to be pretreated prior to a contrast injection.    You should notify the physician, nurse or technologist if you have ever had any of these conditions or if you have any questions.

## 2024-03-04 NOTE — ASSESSMENT & PLAN NOTE
Ddx: autoimmune vs. Myocardial injury vs. Bacterial infection vs. Viral vs. Malignant   Patient has a PMH of phtn, scleroderma, CTEPH   pw increasing chest pressure x 1 day, without radiation into arms  Endorses continued GERD sxs however no new NVD  S/p Methylprednisolone x1 in ED   CT shows pericardial effusion   Echo: EF 60%, No regional wall abnormalities, Grade II diastolic dysfunction  ; Trop 12.9   [id number] and CRP 8.9   MRSA Nares: Negative  TB panel negative 2/27   BCx: NGTD      - started colchicine but dropping dose due to diarrhea to colchicine 0.3.   - on low dose prednisone 20mg daily (as indomethacin alternative, pt reports allerges to ASA and Ibuprofen)   - coronary CTA showed moderate 2 vessel CAD. Recommend plavix and statin but patient refusing medications.   - TB panel 2/27 negative   - CBC daily  - CMP daily   - scds for dvt ppx given hemooptysis   - Allergist recommendations appreciated  - VS frequently

## 2024-03-04 NOTE — PLAN OF CARE
Care Coordination Admission Assessment Note    General Information:  Readmission Within the last 30 days: no previous admission in last 30 days  Does patient have a : No  Patient-Specific Goals (include timeframe): D/C plan home; to provide emotional support to pt/family    Living Arrangements:  Arrived From: home  Current Living Arrangements: home  People in Home: alone  Home Accessibility: stairs within home (Group), stairs to enter home (Group)  Living Arrangement Comments: pt lives alone/2 story home    Housing Stability and Utility Access (SDOH):  In the last 12 months, was there a time when you were not able to pay the mortgage or rent on time?: No  In the last 12 months, how many places have you lived?: 1  In the last 12 months, was there a time when you did not have a steady place to sleep or slept in a shelter (including now)?: No  In the past 12 months has the electric, gas, oil, or water company threatened to shut off services in your home?: No    Functional Status Prior to Admission:   Assistive Device/Animal Currently Used at Home: stair glide  Functional Status Comments: Pt is I PTA  IADL Comments: pt is I PTA     Supports and Services:  Current Outpatient/Agency/Support Group: none  Type of Current Home Care Services: home health aide (24/7 HHAs from [facility] [phone])  History of home care episode or rehab stay: pt is known to [facility] in the past    Discharge Needs Assessment:   Concerns to be Addressed: discharge planning  Current Discharge Risk: physical impairment, lives alone, chronically ill  Anticipated Changes Related to Illness: inability to care for self    Patient/Family Anticipated Discharge Plan:  Patient/Family Anticipates Transition To: home, home with help/services  Patient/Family Anticipated Services at Transition: home health care    Connection to Community  Not applicable    Patient Choice:   Offered/Gave Vendor List: no  Patient's Choice  of Community Agency(s): TBD     Anticipated Discharge Plan:  Met with patient. Provided education and contact information for Care Coordination services.: yes  Anticipated Discharge Disposition: home with assistance, home with home health  Type of Home Care Services: nursing, home PT, home OT    Transportation Needs (SDOH):  Transportation Concerns: none  Transportation Anticipated: family or friend will provide  Is Out of Hospital DNR needed at discharge?: no    In the past 12 months, has lack of transportation kept you from medical appointments or from getting medications?: No  In the past 12 months, has lack of transportation kept you from meetings, work, or from getting things needed for daily living?: No    Concerns - comments: Tentative D/C plan home. Discussed D/C plan with pt. Pt does not have an AD or POA.

## 2024-03-04 NOTE — ASSESSMENT & PLAN NOTE
Patient has a PMH of hypothyroidism 2/2 thyroidectomy for follicular carcinoma of the thyroid     Note H/o Total Thyroidectomy (Dr. Pacheco at [facility]; 4/2013)  - continue home thyroid medications Synthroid 100mcg qd

## 2024-03-04 NOTE — ASSESSMENT & PLAN NOTE
Patient has a pMH of pulmonary htn, scleroderma, CTEPh and precapillary pHTN   Sees Dr. Arvizu   Multiple echos and RHC and LHC   Endorses continued worsening SHOB since 10/2022 with BOWSER, palpitations, bendopnea   3/4: Echo: EF 60%, No regional wall abnormalities, Grade II diastolic dysfunction  Most recent echo 6/2023 showed EF 60%, mild TR, RVSP 55, increased RV systolic pressures compared to April   Had a PE in march 2023, was on elequis however had hemoptysis and it was discontinued   VQ in sept 2023 showed mod-high prob of PE disease and CTA PE showed no acute filling defect but enlargmeent of PA cw PHTN    recently started mecitentan aug/sept 2023 wo much subjective change     Ddx: multifactorial phtn scleroderma ILD vs. CTEPH vs. Pericardial effusion vs. Combo thereof     Impression: 62yo lady with PMH of raynauds, phtn, cutaneous scleroderma, ILD, CTEPh pw worsening SHOB x 24 hours with new pericardial effusion seen on CT. Prior echos have shown increasing RVSP and worsening phtn, for which she is on sildenafil, mecitentan.     - continue sildenafil   - continue mecitentan   - cards on board   - closely monitor BP in ICU   - SCDs for DVT given hemoptysis

## 2024-03-04 NOTE — ASSESSMENT & PLAN NOTE
Patient has a PMH of HFPEF with EF 65%   Endorsing worsening HSOB, denies any ANGEL LUIS, UFD or medication complaince difficulty   CT shows pericardial effusion without pleural effusions or pulmonary edema   Exam negative for ANGEL LUIS, crackles     Impression: 64 yo lady with PMH of scleroderma and PE, phtn on mecitentan and sildenafil pw worseing BOWSER and shob with new pericardial effusion on ct imaging, negative for pulmonary edema or other s/sxs of CHF exacerbation. Unlikely this is a CHF exacerbation.     - continue to monitor   - continue home medications

## 2024-03-04 NOTE — ASSESSMENT & PLAN NOTE
Patient has multiple wounds noted on her feet bilaterally   Wound images from admission in the media tab   Is on CCB at home   States wounds have difficulty healing  Also now with poor nutritional status iso scleroderma and ILD     Ddx: nonhealing ulcers 2/2 autoimmune disease vs. 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 Has chronic nonhealing ulcers to her feet, most likely secondary to her scleroderma and poor nutritional status     - cont CCB  - wound ostomy on board   - consider pain management   - nutrition consult

## 2024-03-04 NOTE — PLAN OF CARE
RD will add oral supplement  Problem: Adult Inpatient Plan of Care  Goal: Plan of Care Review  Outcome: Progressing

## 2024-03-04 NOTE — ASSESSMENT & PLAN NOTE
Patient has a PMH of ILD, CTEPH and phtn on sildenafil, mecetentan, and scleroderma   Has been discussing Tocilizumab with rheumatology but hasn't started it yet   Echo: EF 60%, No regional wall abnormalities, Grade II diastolic dysfunction  Continue pain meds as able     Impression: confirmed ILD and precap phtn 2/2 scleroderma, pw worsening SHOB, cp, and new pericardial effusion     - continue home meds   - cont PTA amlodipine   - continue phtn meds as able   - macitanetan and sildenafil

## 2024-03-04 NOTE — CONSULTS
Wound Ostomy Continence Note    Subjective    HPI Patient is a 63 y.o. female who was admitted on 3/3/2024 with a diagnosis of Pericardial effusion [I31.39]  Interstitial lung disease (CMS/HCC) [J84.9]  Hypoxia [R09.02].    Problem list Principal Problem:    Pericardial effusion  Active Problems:    Interstitial lung disease (CMS/HCC)    Open wound of right foot    Pulmonary hypertension (CMS/HCC)    History of pulmonary embolism    Acute on chronic heart failure with preserved ejection fraction (CMS/HCC)    Hypothyroid     PMH/PSH Past Medical History:   Diagnosis Date   • De Quervain's tenosynovitis    • Essential (primary) hypertension    • Follicular carcinoma of thyroid (CMS/HCC)    • Gastro-esophageal reflux    • Hand ulceration (CMS/HCC)    • Hyperlipidemia, unspecified    • Interstitial lung disease (CMS/HCC)    • Leg ulcer (CMS/HCC)    • Lung nodule    • Lupus (CMS/HCC)    • Osteomyelitis of hand (CMS/HCC)    • Osteoporosis    • Pulmonary hypertension (CMS/HCC)    • Raynaud's disease     Severe   • Reflux esophagitis    • Scleroderma (CMS/HCC)    • Screening mammogram for breast cancer 05/04/2021    BI-RADS category: 1 - Negative (care everywhere @ [place])     Past Surgical History:   Procedure Laterality Date   • CARDIAC CATHETERIZATION     • COLONOSCOPY     • HERNIA REPAIR        Assessment and Recommendation Consult received for chronic foot wounds.  Patient with dry black/brown/yellow necrotic tissue to the right (1cm x 1cm)  and left (1cm x 1cm)  medial ankles and left medial great toe (2cm x 1cm). Ulcers are most likely vascular or neuropathic.   Per patient, she has Raynauds and poor circulation.  She has been going to [facility] the past 3 weeks, but is not specific on her treatment. The ulcers are tender to touch.    Recommend: Apply no sting skin barrier daily. Apply waffle boots. Follow up with [facility] upon discharge as was instructed.    No further topical recommendations.  Signing  off.             Date: 03/04/24  Signature: Pablito Nino RN

## 2024-03-04 NOTE — PLAN OF CARE
Plan of Care Review  Plan of Care Reviewed With: patient  Progress: no change  Outcome Evaluation: Admitted to ICU overnight for observation. Patient AAOx4. Vital signs stable throughout shift. Patient complaining of 10/10 chest pain that was not relieved by Tylenol. Patient repositioned with some relief. Labs pending and cardiology consulted for pericardial effusion.

## 2024-03-04 NOTE — CONSULTS
"Nutrition Assessment    Recommendations  1. Regular diet  - 2gK restriction prn    2. Boost Plus all meals    3. Daily MVI with minerals, folic acid, thiamine           Clinical Course: Patient is a 63 y.o. female who was admitted on 3/3/2024 with a diagnosis of Pericardial effusion [I31.39]  Interstitial lung disease (CMS/HCC) [J84.9]  Hypoxia [R09.02].     Past Medical History:   Diagnosis Date   • De Quervain's tenosynovitis    • Essential (primary) hypertension    • Follicular carcinoma of thyroid (CMS/HCC)    • Gastro-esophageal reflux    • Hand ulceration (CMS/HCC)    • Hyperlipidemia, unspecified    • Interstitial lung disease (CMS/HCC)    • Leg ulcer (CMS/HCC)    • Lung nodule    • Lupus (CMS/HCC)    • Osteomyelitis of hand (CMS/HCC)    • Osteoporosis    • Pulmonary hypertension (CMS/HCC)    • Raynaud's disease     Severe   • Reflux esophagitis    • Scleroderma (CMS/HCC)    • Screening mammogram for breast cancer 05/04/2021    BI-RADS category: 1 - Negative (care everywhere @ [place])     Past Surgical History:   Procedure Laterality Date   • CARDIAC CATHETERIZATION     • COLONOSCOPY     • HERNIA REPAIR               [facility] Nutrition Screen Tool  Has patient lost weight without trying?: 2-->Yes, 14-23 lbs  Has patient been eating poorly due to decreased appetite?: 1-->Yes  MST Nutrition Screen Score: 3           Physical Findings  Last Bowel Movement: 03/04/24  Skin: other (see comments) (open R foot wound)     [id number] Physical Appearance  Last Bowel Movement: 03/04/24  Skin: other (see comments) (open R foot wound)     Nutrition Order  Nutrition Order: does not meet nutritional requirements  Nutrition Order Comments: NPO     Anthropometrics  Height: 167.6 cm (5' 6\")           Current Weight  Weight Method: Bed scale  Weight: 48 kg (105 lb 13.1 oz)     Ideal Body Weight (IBW)  Ideal Body Weight (IBW) (kg): 59.58  % Ideal Body Weight: 80.57            Body Mass Index (BMI)  BMI (Calculated): 17.1 "       Labs/Procedures/Meds  Lab Results Reviewed: reviewed, pertinent   BMP Results       03/04/24 03/03/24 03/03/24     0556 2245 1625     135 137    K 5.4 4.3 4.4    Cl 107 104 104    CO2 22 23 24    Glucose 118 219 94    BUN 22 18 19    Creatinine 0.8 1.0 0.9    Calcium 8.7 9.0 9.8    Anion Gap 6 8 9    EGFR >60.0 >60.0 >60.0         Comment for K at 2245 on 03/03/24: Results obtained on plasma. Plasma Potassium values may be up to 0.4 mEQ/L less than serum values. The differences may be greater for patients with high platelet or white cell counts.    Comment for K at 1625 on 03/03/24: Results obtained on plasma. Plasma Potassium values may be up to 0.4 mEQ/L less than serum values. The differences may be greater for patients with high platelet or white cell counts.    Comment for EGFR at 0556 on 03/04/24: Calculation based on the Chronic Kidney Disease Epidemiology Collaboration (CKD-EPI) equation refit without adjustment for race.    Comment for EGFR at 2245 on 03/03/24: Calculation based on the Chronic Kidney Disease Epidemiology Collaboration (CKD-EPI) equation refit without adjustment for race.    Comment for EGFR at 1625 on 03/03/24: Calculation based on the Chronic Kidney Disease Epidemiology Collaboration (CKD-EPI) equation refit without adjustment for race.          Medications  Pertinent Medications Reviewed: reviewed, pertinent   • acetaminophen  650 mg oral q4h SPENCER   • [Provider Managed Hold] amLODIPine  10 mg oral q AM   • levothyroxine  100 mcg oral Daily   • macitentan  10 mg oral Daily   • mometasone-formoterol  2 puff inhalation BID (8a, 8p)    And   • tiotropium bromide  2 puff inhalation Daily (8a)   • mupirocin  1 Application Topical Daily   • sildenafiL (pulm.hypertension)  20 mg oral TID   • sodium zirconium cyclosilicate  10 g oral Once       Estimated/Assessed Needs  Additional Documentation: Calorie Requirements (Group), Protein Requirements (Group)     Calorie  Requirements  Estimated kCal Needs: Actual Body Weight (48kg)  Estimated Calorie Need Method: kcal/kg  Calorie/kg Recommended: 30-35  Calorie Recommendations: [id number]           Protein Requirements  Recommended Dosing Weight (Estimated Protein Needs): Actual Body Weight  Est Protein Requirement Amount (gms/kg): 1.5-->1.5 gm protein  Protein Recommendations: 72         Fluid requirements 25ml/kg ABW 1200ml    Dosing weight 48kg    NFPE: moderate - severe interosseous and pectoralis wasting    Clinical comments: CICU and MST c/s. Reports eats 3x/day, takes Ensure Plus at home but smaller meals d/t reflux. Has 24h aides to assist with shopping and meal prep. Per EMR, weight loss of 7#/6% since July, not significant for timeframe but has had ongoing weight loss since 2022 (reports she was 160# last year however EMR does not reflect this). +hungry, asking for Ensure or Boost Plus while here. Some difficulty chewing/swallowing but sounds like mainly with meds. Noted K elevated, monitor.     Likely malnutrition but unable to classify at this time.     When feasible, advance diet to regular with Boost Plus all meals.     Goals: meet >75% needs via oral intake  Monitor: po intake    Recommendations: See above     PES  Statement: PES Statement  Nutrition Diagnosis: Inadequate Protein-Energy Intake  Related To:: Decreased ability to consume sufficient energy  As Evidenced By:: NPO  Nutritional Needs Met?: No                                   Date: 03/04/24  Signature: BECCA Kate

## 2024-03-05 LAB
ALBUMIN SERPL-MCNC: 3.1 G/DL (ref 3.5–5.7)
ALP SERPL-CCNC: 52 IU/L (ref 34–125)
ALT SERPL-CCNC: 5 IU/L (ref 7–52)
ANION GAP SERPL CALC-SCNC: 7 MEQ/L (ref 3–15)
AST SERPL-CCNC: 14 IU/L (ref 13–39)
BILIRUB SERPL-MCNC: 0.3 MG/DL (ref 0.3–1.2)
BUN SERPL-MCNC: 27 MG/DL (ref 7–25)
CALCIUM SERPL-MCNC: 8.5 MG/DL (ref 8.6–10.3)
CHLORIDE SERPL-SCNC: 107 MEQ/L (ref 98–107)
CO2 SERPL-SCNC: 23 MEQ/L (ref 21–31)
CREAT SERPL-MCNC: 0.9 MG/DL (ref 0.6–1.2)
EGFRCR SERPLBLD CKD-EPI 2021: >60 ML/MIN/1.73M*2
ERYTHROCYTE [DISTWIDTH] IN BLOOD BY AUTOMATED COUNT: 18.8 % (ref 11.7–14.4)
GLUCOSE SERPL-MCNC: 76 MG/DL (ref 70–99)
HCT VFR BLD AUTO: 28.2 % (ref 35–45)
HGB BLD-MCNC: 8.4 G/DL (ref 11.8–15.7)
MCH RBC QN AUTO: 25.8 PG (ref 28–33.2)
MCHC RBC AUTO-ENTMCNC: 29.8 G/DL (ref 32.2–35.5)
MCV RBC AUTO: 86.8 FL (ref 83–98)
PDW BLD AUTO: 11.1 FL (ref 9.4–12.3)
PLATELET # BLD AUTO: 225 K/UL (ref 150–369)
POTASSIUM SERPL-SCNC: 4.4 MEQ/L (ref 3.5–5.1)
PROT SERPL-MCNC: 7.2 G/DL (ref 6–8.2)
RBC # BLD AUTO: 3.25 M/UL (ref 3.93–5.22)
SODIUM SERPL-SCNC: 137 MEQ/L (ref 136–145)
WBC # BLD AUTO: 7.58 K/UL (ref 3.8–10.5)

## 2024-03-05 PROCEDURE — 80053 COMPREHEN METABOLIC PANEL: CPT

## 2024-03-05 PROCEDURE — 36415 COLL VENOUS BLD VENIPUNCTURE: CPT

## 2024-03-05 PROCEDURE — 63700000 HC SELF-ADMINISTRABLE DRUG

## 2024-03-05 PROCEDURE — 63700000 HC SELF-ADMINISTRABLE DRUG: Performed by: INTERNAL MEDICINE

## 2024-03-05 PROCEDURE — 63700000 HC SELF-ADMINISTRABLE DRUG: Performed by: STUDENT IN AN ORGANIZED HEALTH CARE EDUCATION/TRAINING PROGRAM

## 2024-03-05 PROCEDURE — 85027 COMPLETE CBC AUTOMATED: CPT

## 2024-03-05 PROCEDURE — 94640 AIRWAY INHALATION TREATMENT: CPT

## 2024-03-05 PROCEDURE — 20000000 HC ROOM AND CARE ICU

## 2024-03-05 PROCEDURE — 63600000 HC DRUGS/DETAIL CODE: Mod: JZ

## 2024-03-05 PROCEDURE — 99233 SBSQ HOSP IP/OBS HIGH 50: CPT | Performed by: INTERNAL MEDICINE

## 2024-03-05 RX ORDER — ONDANSETRON 4 MG/1
4 TABLET, ORALLY DISINTEGRATING ORAL EVERY 8 HOURS PRN
Status: DISCONTINUED | OUTPATIENT
Start: 2024-03-05 | End: 2024-03-16 | Stop reason: HOSPADM

## 2024-03-05 RX ORDER — PNV NO.95/FERROUS FUM/FOLIC AC 28MG-0.8MG
100 TABLET ORAL DAILY
Status: DISCONTINUED | OUTPATIENT
Start: 2024-03-05 | End: 2024-03-16 | Stop reason: HOSPADM

## 2024-03-05 RX ORDER — CHOLECALCIFEROL (VITAMIN D3) 25 MCG
1000 TABLET ORAL DAILY
Status: DISCONTINUED | OUTPATIENT
Start: 2024-03-05 | End: 2024-03-16 | Stop reason: HOSPADM

## 2024-03-05 RX ORDER — PREDNISONE 20 MG/1
20 TABLET ORAL DAILY
Status: DISCONTINUED | OUTPATIENT
Start: 2024-03-05 | End: 2024-03-16 | Stop reason: HOSPADM

## 2024-03-05 RX ORDER — ONDANSETRON HYDROCHLORIDE 2 MG/ML
4 INJECTION, SOLUTION INTRAVENOUS EVERY 8 HOURS PRN
Status: DISCONTINUED | OUTPATIENT
Start: 2024-03-05 | End: 2024-03-16 | Stop reason: HOSPADM

## 2024-03-05 RX ADMIN — MOMETASONE FUROATE AND FORMOTEROL FUMARATE DIHYDRATE 2 PUFF: 100; 5 AEROSOL RESPIRATORY (INHALATION) at 07:46

## 2024-03-05 RX ADMIN — ACETAMINOPHEN 650 MG: 325 TABLET ORAL at 16:23

## 2024-03-05 RX ADMIN — Medication 1000 UNITS: at 18:12

## 2024-03-05 RX ADMIN — COLCHICINE 0.6 MG: 0.6 TABLET ORAL at 08:14

## 2024-03-05 RX ADMIN — MECLIZINE HYDROCHLORIDE 25 MG: 25 TABLET, CHEWABLE ORAL at 18:17

## 2024-03-05 RX ADMIN — MOMETASONE FUROATE AND FORMOTEROL FUMARATE DIHYDRATE 2 PUFF: 100; 5 AEROSOL RESPIRATORY (INHALATION) at 20:37

## 2024-03-05 RX ADMIN — COLCHICINE 0.6 MG: 0.6 TABLET ORAL at 20:49

## 2024-03-05 RX ADMIN — TIOTROPIUM BROMIDE INHALATION SPRAY 2 PUFF: 3.12 SPRAY, METERED RESPIRATORY (INHALATION) at 07:46

## 2024-03-05 RX ADMIN — PREDNISONE 20 MG: 20 TABLET ORAL at 10:01

## 2024-03-05 RX ADMIN — ACETAMINOPHEN 650 MG: 325 TABLET ORAL at 10:02

## 2024-03-05 RX ADMIN — ACETAMINOPHEN 650 MG: 325 TABLET ORAL at 22:03

## 2024-03-05 RX ADMIN — Medication 100 MCG: at 18:12

## 2024-03-05 RX ADMIN — ONDANSETRON 4 MG: 2 INJECTION INTRAMUSCULAR; INTRAVENOUS at 18:12

## 2024-03-05 RX ADMIN — AMLODIPINE BESYLATE 10 MG: 10 TABLET ORAL at 08:14

## 2024-03-05 RX ADMIN — LIDOCAINE 1 PATCH: 4 PATCH TOPICAL at 08:14

## 2024-03-05 RX ADMIN — SILDENAFIL 20 MG: 20 TABLET, FILM COATED ORAL at 20:49

## 2024-03-05 RX ADMIN — SILDENAFIL 20 MG: 20 TABLET, FILM COATED ORAL at 13:16

## 2024-03-05 RX ADMIN — ACETAMINOPHEN 650 MG: 325 TABLET ORAL at 02:33

## 2024-03-05 RX ADMIN — LEVOTHYROXINE SODIUM 100 MCG: 0.1 TABLET ORAL at 06:06

## 2024-03-05 RX ADMIN — MUPIROCIN 1 APPLICATION: 20 OINTMENT TOPICAL at 08:14

## 2024-03-05 RX ADMIN — MECLIZINE HYDROCHLORIDE 25 MG: 25 TABLET, CHEWABLE ORAL at 08:14

## 2024-03-05 RX ADMIN — SILDENAFIL 20 MG: 20 TABLET, FILM COATED ORAL at 08:14

## 2024-03-05 ASSESSMENT — COGNITIVE AND FUNCTIONAL STATUS - GENERAL
WALKING IN HOSPITAL ROOM: 2 - A LOT
STANDING UP FROM CHAIR USING ARMS: 2 - A LOT
WALKING IN HOSPITAL ROOM: 2 - A LOT
WALKING IN HOSPITAL ROOM: 2 - A LOT
MOVING TO AND FROM BED TO CHAIR: 2 - A LOT
CLIMB 3 TO 5 STEPS WITH RAILING: 2 - A LOT
STANDING UP FROM CHAIR USING ARMS: 2 - A LOT
MOVING TO AND FROM BED TO CHAIR: 2 - A LOT
CLIMB 3 TO 5 STEPS WITH RAILING: 2 - A LOT
CLIMB 3 TO 5 STEPS WITH RAILING: 2 - A LOT
MOVING TO AND FROM BED TO CHAIR: 2 - A LOT
STANDING UP FROM CHAIR USING ARMS: 2 - A LOT

## 2024-03-05 NOTE — PROGRESS NOTES
Internal Medicine  Transfer To/From Critical Care       SUMMARY OF HOSPITAL COURSE   This is a 63 y.o. year-old female who was admitted on 3/3/2024 with Pericardial effusion [I31.39]  Interstitial lung disease (CMS/HCC) [J84.9]  Hypoxia [R09.02] and is currently being treated for Pericardial effusion.   This is a 63 y.o. female with a past medical history of scleroderma, GERD, PE not on therapy, epistaxis and hemoptysis, pulmonary htn on mecitentan and sildenafil who presents with worsening SHOB and chest pain for the past 24hrs. The patient states she's been having worsening shob and CP for the last year but it acutely worsened in the last 24 hours. She states its pressure like, over her sternum, without radiation into her arms, and she does endorse periodic palpitations and night sweats with nause, as well as continued epistaxis and hemoptysis however these are not acutely associated with her pain.  Patient presented with increasing chest pressure x 1 day, without radiation into arms Endorses continued GERD sxs however no new NVD.BP has been stable 130s sbp   Labs: ; Trop 12.9 ; TB panel negative 2/27 D-dimer 0.68  Blood cultures no growth to date.   Patient required allergy premedications. CTA PE with IV contrast shows moderate pericardial effusion. Patient placed on 2L Oxygen  3/4 Echo showed normal sized Left ventricles. EF: 60%. No regional wall motion abnormalities. Grade II diastolic dysfunction.       # This is a ICU to Floors Transfer note#       # Chest pain   #Left shoulder pain   Patient presented with increasing chest pressure x 1 day, without radiation into arms Endorses continued GERD sxs however no new NVD.Vital signs were WNL and remained hemodynamically stable.  EKG and HS troponins unremarkable. Patient reported associated headache, lightheadedness, and  new left shoulder pain during hospital stay. Coronary CTA order for further evaluation to rule out coronary artery involvement.   Multimodal pain modalities including tylenol 650mg three time a day as needed, lidocaine patch tramadol 2.5mg x 2, hot packs used throughout stay to address symptoms.     [ ] Planned Coronary CTA for 3/7/2024 at 9 am ,   [ ] Will require 21 hrs steroid/benadryl prep prior to  CCTA due to contrast dye allergies       #Pericardial effusion   #Interstitial Lung disease  #Pulmonary HTN    Patient presented with increasing chest pressure x 1 day, without radiation into arms Endorses continued GERD sxs however no new NVD.BP has been stable 130s sbp   Labs: ; Trop 12.9 ; TB panel negative 2/27  Blood cultures no growth to date.   CT shows moderate pericardial effusion   3/4 Echo showed normal sized Left ventricles. EF: 60%. No regional wall motion abnormalities. Grade II diastolic dysfunction.   TB panel negative   Cardiology consulted.Allergist consulted and recommended against using indomethacin given history of shortness of breath with asiprin and ibuprofen.    [ ] continue colchicine 0.6mg BID started 3/4  [ ] continue Prednisone  20 mg started 3/5 (a total two week course) Will taper with Dr. Arvizu, cardiology, as an outpatient.   [ ] Pericardial effusion work up : Coxsackie , CMV qual    #Acute on chronic Heart failure with preserved EF  3/4 Echo showed normal sized Left ventricles. EF: 60%. No regional wall motion    [ ] continue home medications    #Hx of Pulmonary embolism    continue sildenafil   continue mecitentan   closely monitor BP in ICU   SCDs for DVT given hemoptysis    #Hypothyroid    continue PTA synthroid     #Foot Ulcers/ Open wound of Right foot  Known history of bilateral LE ulcers and raynauds. Foot ulcers present on arrival.  Medial right and Left medial ankles and left medial great toe and tender to touch. Plain film of bilateral feet show no evidence of acute infection. Wound care consulted. Waffle boots and no sting skin barrier applied daily.          CODE STATUS      Their Code Status  is Full Code      CURRENT MEDICATIONS     Current Facility-Administered Medications   Medication Dose Route Frequency   • acetaminophen  650 mg oral q6h PRN   • amLODIPine  10 mg oral q AM   • [Provider Managed Hold] amLODIPine  5-10 mg oral Nightly PRN   • cholecalciferol (vitamin D3)  1,000 Units oral Daily   • colchicine  0.6 mg oral BID   • cyanocobalamin  100 mcg oral Daily   • glucose  16-32 g of dextrose oral PRN    Or   • dextrose  15-30 g of dextrose oral PRN    Or   • glucagon  1 mg intramuscular PRN    Or   • dextrose 50 % in water (D50)  25 mL intravenous PRN   • levothyroxine  100 mcg oral Daily   • lidocaine  1 patch Topical Daily   • meclizine  25 mg oral 3x daily PRN   • mometasone-formoterol  2 puff inhalation BID (8a, 8p)    And   • tiotropium bromide  2 puff inhalation Daily (8a)   • mupirocin  1 Application Topical Daily   • ondansetron ODT  4 mg oral q8h PRN    Or   • ondansetron  4 mg intravenous q8h PRN   • predniSONE  20 mg oral Daily   • macitentan  10 mg oral Daily   • sildenafiL (pulm.hypertension)  20 mg oral TID       All inpatient medications were personally reviewed.     PERTINENT PHYSICAL EXAMINATION      Vital signs in last 24 hours:  Temp:  [36.8 °C (98.2 °F)-37.5 °C (99.5 °F)] 36.8 °C (98.3 °F)  Heart Rate:  [] 81  Resp:  [18-32] 25  BP: (112-144)/(55-72) 122/61  Temp (24hrs), Av.1 °C (98.7 °F), Min:36.8 °C (98.2 °F), Max:37.5 °C (99.5 °F)      Intake/Output Summary (Last 24 hours) at 3/6/2024 1305  Last data filed at 3/6/2024 0600  Gross per 24 hour   Intake 230 ml   Output 100 ml   Net 130 ml       Physical Exam  Vitals and nursing note reviewed.   Constitutional:       General: She is not in acute distress.     Appearance: She is not ill-appearing.   HENT:      Head: Normocephalic.      Right Ear: External ear normal.      Left Ear: External ear normal.   Eyes:      General: No scleral icterus.        Right eye: No discharge.         Left eye: No discharge.    Cardiovascular:      Rate and Rhythm: Normal rate and regular rhythm.      Heart sounds: No murmur heard.     No gallop.   Pulmonary:      Effort: Pulmonary effort is normal. No respiratory distress.      Breath sounds: Normal breath sounds. No wheezing or rales.   Abdominal:      Palpations: Abdomen is soft.   Musculoskeletal:         General: Normal range of motion.      Right lower leg: No edema.      Left lower leg: No edema.   Skin:     General: Skin is warm and dry.      Coloration: Skin is not jaundiced.   Neurological:      Mental Status: She is alert and oriented to person, place, and time.   Psychiatric:         Mood and Affect: Mood normal.          SIGNIFICANT LABS / IMAGING      Labs       Results from last 7 days   Lab Units 03/06/24  0616 03/05/24  0403 03/04/24  0543   WBC K/uL 8.38 7.58 4.26   HEMOGLOBIN g/dL 8.8* 8.4* 8.5*   HEMATOCRIT % 29.1* 28.2* 27.6*   PLATELETS K/uL 247 225 231     Results from last 7 days   Lab Units 03/06/24  0616 03/05/24  0403 03/04/24  0556   SODIUM mEQ/L 138 137 135*   POTASSIUM mEQ/L 4.5 4.4 5.4*   CHLORIDE mEQ/L 107 107 107   CO2 mEQ/L 24 23 22   BUN mg/dL 24 27* 22   CREATININE mg/dL 0.9 0.9 0.8   CALCIUM mg/dL 8.8 8.5* 8.7   ALBUMIN g/dL 3.2* 3.1* 3.2*   BILIRUBIN TOTAL mg/dL 0.3 0.3 0.3   ALK PHOS IU/L 52 52 56   ALT IU/L 7 5* 6*   AST IU/L 15 14 20   GLUCOSE mg/dL 73 76 118*           Imaging  Echocardiogram  • Normal-sized left ventricle. Mild left ventricular hypertrophy. Preserved systolic function. LVEF 60%. No regional wall motion abnormalities. Grade II diastolic dysfunction.  Normal global longitudinal strain (-20%).  • The aortic valve is not well seen.  Cannot determine the number of cusps.  Sclerotic leaflets.  No aortic stenosis.  Trace aortic insufficiency.  • Normal sized aortic root.  The visualized portion of the ascending aorta is normal in size.  • The mitral valve leaflets are thickened. Mild mitral annular calcification. Trace mitral  regurgitation.   • Mildly dilated left atrium.  • Normal-sized right ventricle. Normal right ventricular systolic function.  • Thickened tricuspid valve. There is mild tricuspid regurgitation with estimated RVSP of 46 mmHg.   • Pulmonic valve is not well visualized. Trace pulmonic regurgitation.   • Mildly dilated right atrium.  • IVC is enlarged with <50% respiratory variation consistent with elevated right atrial filling pressure (at least 15 mmHg).   • Moderate, circumferential, circumferential pericardial effusion.  The largest pocket is located inferolaterally.  Elevated right atrial pressure.  No other clear echocardiographic features of increased pericardial pressure.  Correlate clinically.   • Compared to previous TTE from 6/21/2023, pericardial effusion now moderate in size.  Right atrial pressure now elevated.         ECG/Telemetry    LAST ASSESSMENT AND PLAN      Interstitial lung disease (CMS/HCC)  Assessment & Plan  Patient has a PMH of ILD, CTEPH and phtn on sildenafil, mecetentan, and scleroderma   Has been discussing Tocilizumab with rheumatology but hasn't started it yet   Echo: EF 60%, No regional wall abnormalities, Grade II diastolic dysfunction  Continue pain meds as able     Impression: confirmed ILD and precap phtn 2/2 scleroderma, pw worsening SHOB, cp, and new pericardial effusion     - continue home meds   - cont PTA amlodipine   - continue phtn meds as able   - asked family to bring in mecitentan for patient; otherwise may evaluate cards office for samples       Hypothyroid  Assessment & Plan  Patient has a PMH of hypothyroidism 2/2 thyroidectomy for follicular carcinoma of the thyroid       - continue home thyroid medications     Acute on chronic heart failure with preserved ejection fraction (CMS/HCC)  Assessment & Plan  Patient has a PMH of HFPEF with EF 65%   Endorsing worsening HSOB, denies any ANGEL LUIS, UFD or medication complaince difficulty   CT shows pericardial effusion without  pleural effusions or pulmonary edema   Exam negative for ANGEL LUIS, crackles     Impression: 62 yo lady with PMH of scleroderma and PE, phtn on mecitentan and sildenafil pw worseing BOWSER and shob with new pericardial effusion on ct imaging, negative for pulmonary edema or other s/sxs of CHF exacerbation. Unlikely this is a CHF exacerbation.     - continue to monitor   - continue home medications     History of pulmonary embolism  Assessment & Plan  Patient has a PMH of phtn and PE tx w/ apixiban however cb epistaxis and hemoptysis   Stopped elequis in June 2023   pw worsening shob and new pericardial effusion   Continues to endorse hemoptysis and epistaxis worsening over last few days   Echo EF 60%, no regional wall motion abnormalities, grade II diastolic dysfunction    - scds for now for dvt ppx       Pulmonary hypertension (CMS/HCC)  Assessment & Plan  Patient has a pMH of pulmonary htn, scleroderma, CTEPh and precapillary pHTN   Sees Dr. Arvizu   Multiple echos and RHC and LHC   Endorses continued worsening SHOB since 10/2022 with BOWSER, palpitations, bendopnea   3/4: Echo: EF 60%, No regional wall abnormalities, Grade II diastolic dysfunction  Most recent echo 6/2023 showed EF 60%, mild TR, RVSP 55, increased RV systolic pressures compared to April   Had a PE in march 2023, was on elequis however had hemoptysis and it was discontinued   VQ in sept 2023 showed mod-high prob of PE disease and CTA PE showed no acute filling defect but enlargmeent of PA cw PHTN    recently started mecitentan aug/sept 2023 wo much subjective change     Ddx: multifactorial phtn scleroderma ILD vs. CTEPH vs. Pericardial effusion vs. Combo thereof     Impression: 64yo lady with PMH of raynauds, phtn, cutaneous scleroderma, ILD, CTEPh pw worsening SHOB x 24 hours with new pericardial effusion seen on CT. Prior echos have shown increasing RVSP and worsening phtn, for which she is on sildenafil, mecitentan.     - continue sildenafil   - continue  mecitentan   - FU cards cs   - closely monitor BP in ICU   - SCDs for DVT given hemoptysis            Open wound of right foot  Assessment & Plan  Patient has multiple wounds noted on her feet bilaterally   Wound images from admission in the media tab   Is on CCB at home   States wounds have difficulty healing  Also now with poor nutritional status iso scleroderma and ILD     Ddx: nonhealing ulcers 2/2 autoimmune disease vs. 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 Has chronic nonhealing ulcers to her feet, most likely secondary to her scleroderma and poor nutritional status     - cont CCB  - wound ostomy consult   - consider pain management   - nutrition consult     * Pericardial effusion  Assessment & Plan  Ddx: autoimmune vs. Myocardial injury vs. Bacterial infection vs. Viral vs. Malignant   Patient has a PMH of phtn, scleroderma, CTEPH   pw increasing chest pressure x 1 day, without radiation into arms  Endorses continued GERD sxs however no new NVD  S/p Methylprednisolone x1 in ED   CT shows pericardial effusion   Echo: EF 60%, No regional wall abnormalities, Grade II diastolic dysfunction  ; Trop 12.9   [id number] and CRP 8.9   MRSA Nares: Negative  TB panel negative 2/27   BCx: NGTD      - cont Colchicine 0.6 mg BID  - start low dose prednisone 20mg daily (as indomethacin alternative, pt reports allerges to ASA and Ibuprofen)   - Cardiac Cath, if patient can tolerate  - CMV qual   - TB panel 2/27 negative   - coxsackie FU qual   - CBC daily  - CMP daily   - scds for dvt ppx given hemooptysis   - Allergist recommendations appreciated  - VS frequently          PCP/KEY FAMILY/EMERGENCY CONTACTS      PCP:  Sara Simmons MD    Emergency Contact:  Extended Emergency Contact Information  Primary Emergency Contact: mauricio french  Mobile Phone:  [phone]  Relation: Daughter  Secondary Emergency Contact: Wagner Khan  Mobile Phone: [phone]  Relation: Daughter               VTE Assessment: [place]    Estimated discharge date: 3/8/2024     ATTENDING DOCUMENTATION  ALSO SEE ATTENDING ATTESTATION SECTION OF NOTE

## 2024-03-05 NOTE — PROGRESS NOTES
"     Internal Medicine  ICU/CCU Daily Progress Note        SUBJECTIVE   This is a 63 y.o. year-old female admitted on 3/3/2024 with Pericardial effusion [I31.39]  Interstitial lung disease (CMS/HCC) [J84.9]  Hypoxia [R09.02].    63 y.o female PMHx of Scleraderma, ILD, hx of PE 3/2023 and pulmonary htn on Macitentan and sildenafil  Presented with worsening SOB and chest pain.     RHC 1/2022:  Top-normal right sided filling pressure with mild pulmonary HTN, top- normal PVR and normal PCWP.     ED: Tachycardiac (132), blood pressure 138/67, Satt'ing 90% on ra, improving to 96% on 1L. D.Dimer 0.68. Urine analysis: trace leukocyte esteras, 1+proteins. CTA PE with IV contrast showed moderate to large pericardial effusionno evidence of PE.     Interval History: No acute events overnight. Pt has continued c/o chest pressure and SOB without change in intensity. Pt reports difficulty overnight and feeling uncomfortable. Pt reports feeling \" indents in her head\" Pt reports having nose bleeds, however pt has a history.   Associated lightheadedness.    OBJECTIVE   Vital signs in last 24 hours:  Temp:  [36.7 °C (98.1 °F)-37.7 °C (99.9 °F)] 37.2 °C (99 °F)  Heart Rate:  [] 85  Resp:  [17-35] 26  BP: (109-148)/(53-73) 128/61  SpO2:  [93 %-100 %] 100 %  Oxygen Therapy: None (Room air)  O2 Delivery Method: Nasal cannula  O2 Flow Rate (L/min):  [1 L/min] 1 L/min  MAP: 84      Weight & I/Os:  Weights (last 5 days)     Date/Time Weight Drug Calculation Weight (Dosing Weight)    03/05/24 0615 45.9 kg (101 lb 3.1 oz) --    03/04/24 0733 47.6 kg (105 lb) --    03/04/24 0000 48 kg (105 lb 13.1 oz) 48 kg (105 lb 13.1 oz)    03/03/24 1506 45.4 kg (100 lb) --          Intake/Output Summary (Last 24 hours) at 3/5/2024 0659  Last data filed at 3/5/2024 0530  24 Hour Net Input/Output from 7AM Yesterday   Intake 440 ml   Output 850 ml   Net -410 ml           Respiratory:           Telemetry/EKG:  I have independently reviewed the telemetry. " No events for the last 24 hours.     PHYSICAL EXAMINATION   Physical Exam  Vitals and nursing note reviewed.   Constitutional:       General: She is not in acute distress.     Appearance: She is not ill-appearing.   HENT:      Head: Normocephalic.      Right Ear: External ear normal.      Left Ear: External ear normal.   Eyes:      General: No scleral icterus.        Right eye: No discharge.         Left eye: No discharge.   Cardiovascular:      Rate and Rhythm: Normal rate and regular rhythm.      Heart sounds: No murmur heard.     No gallop.   Pulmonary:      Effort: Pulmonary effort is normal. No respiratory distress.      Breath sounds: Normal breath sounds. No wheezing or rales.   Abdominal:      Palpations: Abdomen is soft.   Musculoskeletal:      Right lower leg: No edema.      Left lower leg: No edema.   Skin:     General: Skin is warm and dry.      Coloration: Skin is not jaundiced.      Comments: TTP along (L) scalp and subocciptial area.    Neurological:      Mental Status: She is alert and oriented to person, place, and time.   Psychiatric:         Mood and Affect: Mood normal.          LINES, CATHETERS, DRAINS, AIRWAYS, & WOUNDS   Lines, drains, airways, & wounds:  Peripheral IV (Adult) 03/03/24 Left Antecubital (Active)   Number of days: 1        LABS & IMAGING   Labs:  I have reviewed the patient's labs to the time of note. No new clinical concern.    Results from last 7 days   Lab Units 03/05/24  0403 03/04/24  0543 03/03/24  2245   WBC K/uL 7.58 4.26 8.06   HEMOGLOBIN g/dL 8.4* 8.5* 9.0*   HEMATOCRIT % 28.2* 27.6* 29.4*   PLATELETS K/uL 225 231 217       Results from last 7 days   Lab Units 03/05/24  0403 03/04/24  0556 03/03/24  2245   SODIUM mEQ/L 137 135* 135*   POTASSIUM mEQ/L 4.4 5.4* 4.3   CHLORIDE mEQ/L 107 107 104   CO2 mEQ/L 23 22 23   BUN mg/dL 27* 22 18   CREATININE mg/dL 0.9 0.8 1.0   CALCIUM mg/dL 8.5* 8.7 9.0   ALBUMIN g/dL 3.1* 3.2* 3.2*   BILIRUBIN TOTAL mg/dL 0.3 0.3 0.2*   ALK PHOS  "IU/L 52 56 57   ALT IU/L 5* 6* 5*   AST IU/L 14 20 11*   GLUCOSE mg/dL 76 118* 219*     Lab Results   Component Value Date    MG 2.1 02/27/2024    CA 9.5 09/27/2023     No results found for: \"PT\", \"INR\", \"PTT\"    Microbiology Results (last 3 days)     Procedure Component Value - Date/Time    MRSA Screen, Nares Only Nose [[id number]]  (Normal) Collected: 03/04/24 0027    Lab Status: Final result Specimen: Nasal Swab from Nose Updated: 03/04/24 0216     MRSA DNA, Nares Negative     Comment: No methicillin resistant Staphylococcus aureus (MRSA) detected.       SARS-COV-2 (COVID-19)/ FLU A/B, AND RSV, PCR Nasopharynx [[id number]]  (Normal) Collected: 03/03/24 1515    Lab Status: Final result Specimen: Nasopharyngeal Swab from Nasopharynx Updated: 03/03/24 1708     SARS-CoV-2 (COVID-19) Negative     Influenza A Negative     Influenza B Negative     Respiratory Syncytial Virus Negative    Narrative:      Testing performed using real-time PCR for detection of COVID-19. EUA approved validation studies performed on site.           Imaging personally reviewed (does not include unread studies):  Transthoracic Echo (TTE) Complete    Result Date: 3/4/2024  Normal-sized left ventricle. Mild left ventricular hypertrophy. Preserved systolic function. LVEF 60%. No regional wall motion abnormalities. Grade II diastolic dysfunction.  Normal global longitudinal strain (-20%). The aortic valve is not well seen.  Cannot determine the number of cusps.  Sclerotic leaflets.  No aortic stenosis.  Trace aortic insufficiency. Normal sized aortic root.  The visualized portion of the ascending aorta is normal in size. The mitral valve leaflets are thickened. Mild mitral annular calcification. Trace mitral regurgitation. Mildly dilated left atrium. Normal-sized right ventricle. Normal right ventricular systolic function. Thickened tricuspid valve. There is mild tricuspid regurgitation with estimated RVSP of 46 mmHg. Pulmonic valve is not well " visualized. Trace pulmonic regurgitation. Mildly dilated right atrium. IVC is enlarged with <50% respiratory variation consistent with elevated right atrial filling pressure (at least 15 mmHg). Moderate, circumferential, circumferential pericardial effusion.  The largest pocket is located inferolaterally.  Elevated right atrial pressure.  No other clear echocardiographic features of increased pericardial pressure.  Correlate clinically. Compared to previous TTE from 6/21/2023, pericardial effusion now moderate in size.  Right atrial pressure now elevated.      MEDS/DRIPS   Scheduled Meds:  • amLODIPine  10 mg oral q AM   • colchicine  0.6 mg oral BID   • levothyroxine  100 mcg oral Daily   • lidocaine  1 patch Topical Daily   • mometasone-formoterol  2 puff inhalation BID (8a, 8p)    And   • tiotropium bromide  2 puff inhalation Daily (8a)   • mupirocin  1 Application Topical Daily   • predniSONE  20 mg oral Daily   • macitentan  10 mg oral Daily   • sildenafiL (pulm.hypertension)  20 mg oral TID     Continuous Infusions:  PRN Meds:.•  acetaminophen  •  [Provider Managed Hold] amLODIPine  •  glucose **OR** dextrose **OR** glucagon **OR** dextrose 50 % in water (D50)  •  meclizine     ASSESSMENT & PLAN   Interstitial lung disease (CMS/HCC)  Assessment & Plan  Patient has a PMH of ILD, CTEPH and phtn on sildenafil, mecetentan, and scleroderma   Has been discussing Tocilizumab with rheumatology but hasn't started it yet   Echo: EF 60%, No regional wall abnormalities, Grade II diastolic dysfunction  Continue pain meds as able     Impression: confirmed ILD and precap phtn 2/2 scleroderma, pw worsening SHOB, cp, and new pericardial effusion     - continue home meds   - cont PTA amlodipine   - continue phtn meds as able   - asked family to bring in mecitentan for patient; otherwise may evaluate cards office for samples       Hypothyroid  Assessment & Plan  Patient has a PMH of hypothyroidism 2/2 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       - continue home thyroid medications     Acute on chronic heart failure with preserved ejection fraction (CMS/HCC)  Assessment & Plan  Patient has a PMH of HFPEF with EF 65%   Endorsing worsening HSOB, denies any ANGEL LUIS, UFD or medication complaince difficulty   CT shows pericardial effusion without pleural effusions or pulmonary edema   Exam negative for ANGEL LUIS, crackles     Impression: 64 yo lady with PMH of scleroderma and PE, phtn on mecitentan and sildenafil pw worseing BOWSER and shob with new pericardial effusion on ct imaging, negative for pulmonary edema or other s/sxs of CHF exacerbation. Unlikely this is a CHF exacerbation.     - continue to monitor   - continue home medications     History of pulmonary embolism  Assessment & Plan  Patient has a PMH of phtn and PE tx w/ apixiban however cb epistaxis and hemoptysis   Stopped elequis in June 2023   pw worsening shob and new pericardial effusion   Continues to endorse hemoptysis and epistaxis worsening over last few days   Echo EF 60%, no regional wall motion abnormalities, grade II diastolic dysfunction    - scds for now for dvt ppx       Pulmonary hypertension (CMS/[facility])  Assessment & Plan  Patient has a pMH of pulmonary htn, scleroderma, CTEPh and precapillary pHTN   Sees Dr. Arvizu   Multiple echos and RHC and LHC   Endorses continued worsening SHOB since 10/2022 with BOWSER, palpitations, bendopnea   3/4: Echo: EF 60%, No regional wall abnormalities, Grade II diastolic dysfunction  Most recent echo 6/2023 showed EF 60%, mild TR, RVSP 55, increased RV systolic pressures compared to April   Had a PE in march 2023, was on elequis however had hemoptysis and it was discontinued   VQ in sept 2023 showed mod-high prob of PE disease and CTA PE showed no acute filling defect but enlargmeent of PA cw PHTN    recently started mecitentan aug/sept 2023 wo much subjective change     Ddx: multifactorial phtn scleroderma ILD vs. CTEPH vs.  Pericardial effusion vs. Combo thereof     Impression: 62yo lady with PMH of raynauds, phtn, cutaneous scleroderma, ILD, CTEPh pw worsening SHOB x 24 hours with new pericardial effusion seen on CT. Prior echos have shown increasing RVSP and worsening phtn, for which she is on sildenafil, mecitentan.     - continue sildenafil   - continue mecitentan   - FU cards cs   - closely monitor BP in ICU   - SCDs for DVT given hemoptysis            Open wound of right foot  Assessment & Plan  Patient has multiple wounds noted on her feet bilaterally   Wound images from admission in the media tab   Is on CCB at home   States wounds have difficulty healing  Also now with poor nutritional status iso scleroderma and ILD     Ddx: nonhealing ulcers 2/2 autoimmune disease vs. 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 Has chronic nonhealing ulcers to her feet, most likely secondary to her scleroderma and poor nutritional status     - cont CCB  - wound ostomy consult   - consider pain management   - nutrition consult     * Pericardial effusion  Assessment & Plan  Ddx: autoimmune vs. Myocardial injury vs. Bacterial infection vs. Viral vs. Malignant   Patient has a PMH of phtn, scleroderma, CTEPH   pw increasing chest pressure x 1 day, without radiation into arms  Endorses continued GERD sxs however no new NVD  S/p Methylprednisolone x1 in ED   CT shows pericardial effusion   Echo: EF 60%, No regional wall abnormalities, Grade II diastolic dysfunction  ; Trop 12.9   [id number] and CRP 8.9   MRSA Nares: Negative  TB panel negative 2/27   BCx: NGTD      - cont Colchicine 0.6 mg BID  - start low dose prednisone 20mg daily (as indomethacin alternative, pt reports allerges to ASA and Ibuprofen)   - Cardiac Cath, if patient can tolerate  - CMV qual   - TB panel 2/27 negative   - coxsackie FU  qual   - CBC daily  - CMP daily   - scds for dvt ppx given hemooptysis   - Allergist recommendations appreciated  - VS frequently          VTE Assessment: Padua    VTE Prophylaxis: Current anticoagulants:    •None      Code Status: Full Code  Estimated discharge date: 3/8/2024       ICU CHECKLIST   Lines:   Peripheral IV (Adult) 03/03/24 Left Antecubital (Active)   Number of days: 1     Byrd: No     Sedation Vacation: No sedation    Physical Therapy: No     Nutrition: Regular diet    DVT Prophylaxis: SCD in place    GI Prophylaxis: No        ATTENDING DOCUMENTATION  ALSO SEE ATTENDING ATTESTATION SECTION OF NOTE

## 2024-03-06 LAB
ALBUMIN SERPL-MCNC: 3.2 G/DL (ref 3.5–5.7)
ALP SERPL-CCNC: 52 IU/L (ref 34–125)
ALT SERPL-CCNC: 7 IU/L (ref 7–52)
ANION GAP SERPL CALC-SCNC: 7 MEQ/L (ref 3–15)
AST SERPL-CCNC: 15 IU/L (ref 13–39)
BILIRUB SERPL-MCNC: 0.3 MG/DL (ref 0.3–1.2)
BUN SERPL-MCNC: 24 MG/DL (ref 7–25)
CALCIUM SERPL-MCNC: 8.8 MG/DL (ref 8.6–10.3)
CHLORIDE SERPL-SCNC: 107 MEQ/L (ref 98–107)
CO2 SERPL-SCNC: 24 MEQ/L (ref 21–31)
CREAT SERPL-MCNC: 0.9 MG/DL (ref 0.6–1.2)
EGFRCR SERPLBLD CKD-EPI 2021: >60 ML/MIN/1.73M*2
ERYTHROCYTE [DISTWIDTH] IN BLOOD BY AUTOMATED COUNT: 18.7 % (ref 11.7–14.4)
EV RNA SPEC QL NAA+PROBE: NOT DETECTED
GLUCOSE BLD-MCNC: 114 MG/DL (ref 70–99)
GLUCOSE BLD-MCNC: 121 MG/DL (ref 70–99)
GLUCOSE SERPL-MCNC: 73 MG/DL (ref 70–99)
HCT VFR BLD AUTO: 29.1 % (ref 35–45)
HGB BLD-MCNC: 8.8 G/DL (ref 11.8–15.7)
MCH RBC QN AUTO: 26 PG (ref 28–33.2)
MCHC RBC AUTO-ENTMCNC: 30.2 G/DL (ref 32.2–35.5)
MCV RBC AUTO: 85.8 FL (ref 83–98)
PDW BLD AUTO: 11.4 FL (ref 9.4–12.3)
PLATELET # BLD AUTO: 247 K/UL (ref 150–369)
POCT TEST: ABNORMAL
POCT TEST: ABNORMAL
POTASSIUM SERPL-SCNC: 4.5 MEQ/L (ref 3.5–5.1)
PROT SERPL-MCNC: 7.6 G/DL (ref 6–8.2)
RBC # BLD AUTO: 3.39 M/UL (ref 3.93–5.22)
SODIUM SERPL-SCNC: 138 MEQ/L (ref 136–145)
SPECIMEN SOURCE: NORMAL
WBC # BLD AUTO: 8.38 K/UL (ref 3.8–10.5)

## 2024-03-06 PROCEDURE — 63700000 HC SELF-ADMINISTRABLE DRUG

## 2024-03-06 PROCEDURE — 99233 SBSQ HOSP IP/OBS HIGH 50: CPT | Performed by: HOSPITALIST

## 2024-03-06 PROCEDURE — 63700000 HC SELF-ADMINISTRABLE DRUG: Performed by: STUDENT IN AN ORGANIZED HEALTH CARE EDUCATION/TRAINING PROGRAM

## 2024-03-06 PROCEDURE — 99233 SBSQ HOSP IP/OBS HIGH 50: CPT | Performed by: INTERNAL MEDICINE

## 2024-03-06 PROCEDURE — 21400000 HC ROOM AND CARE CCU/INTERMEDIATE

## 2024-03-06 PROCEDURE — 63600000 HC DRUGS/DETAIL CODE: Performed by: STUDENT IN AN ORGANIZED HEALTH CARE EDUCATION/TRAINING PROGRAM

## 2024-03-06 PROCEDURE — 63600000 HC DRUGS/DETAIL CODE: Mod: JZ

## 2024-03-06 PROCEDURE — 97166 OT EVAL MOD COMPLEX 45 MIN: CPT | Mod: GO

## 2024-03-06 PROCEDURE — 97530 THERAPEUTIC ACTIVITIES: CPT | Mod: GP

## 2024-03-06 PROCEDURE — 25000000 HC PHARMACY GENERAL

## 2024-03-06 PROCEDURE — 36415 COLL VENOUS BLD VENIPUNCTURE: CPT

## 2024-03-06 PROCEDURE — 97530 THERAPEUTIC ACTIVITIES: CPT | Mod: GO

## 2024-03-06 PROCEDURE — 85027 COMPLETE CBC AUTOMATED: CPT

## 2024-03-06 PROCEDURE — 97162 PT EVAL MOD COMPLEX 30 MIN: CPT | Mod: GP

## 2024-03-06 PROCEDURE — 80053 COMPREHEN METABOLIC PANEL: CPT

## 2024-03-06 PROCEDURE — 94640 AIRWAY INHALATION TREATMENT: CPT

## 2024-03-06 PROCEDURE — 63700000 HC SELF-ADMINISTRABLE DRUG: Performed by: PHYSICAL THERAPIST

## 2024-03-06 RX ORDER — INSULIN LISPRO 100 [IU]/ML
3-5 INJECTION, SOLUTION INTRAVENOUS; SUBCUTANEOUS
Status: DISCONTINUED | OUTPATIENT
Start: 2024-03-06 | End: 2024-03-11

## 2024-03-06 RX ORDER — TRAMADOL HYDROCHLORIDE 50 MG/1
12.5 TABLET ORAL ONCE
Status: COMPLETED | OUTPATIENT
Start: 2024-03-06 | End: 2024-03-06

## 2024-03-06 RX ORDER — METOPROLOL TARTRATE 50 MG/1
50 TABLET ORAL ONCE
Status: DISCONTINUED | OUTPATIENT
Start: 2024-03-06 | End: 2024-03-06

## 2024-03-06 RX ORDER — DIPHENHYDRAMINE HCL 50 MG
50 CAPSULE ORAL ONCE
Status: COMPLETED | OUTPATIENT
Start: 2024-03-07 | End: 2024-03-07

## 2024-03-06 RX ORDER — TRAMADOL HYDROCHLORIDE 50 MG/1
50 TABLET ORAL ONCE
Status: DISCONTINUED | OUTPATIENT
Start: 2024-03-06 | End: 2024-03-06

## 2024-03-06 RX ORDER — METHYLPREDNISOLONE 16 MG/1
32 TABLET ORAL
Status: DISCONTINUED | OUTPATIENT
Start: 2024-03-06 | End: 2024-03-06

## 2024-03-06 RX ORDER — PREDNISONE 50 MG/1
50 TABLET ORAL
Status: DISCONTINUED | OUTPATIENT
Start: 2024-03-06 | End: 2024-03-07

## 2024-03-06 RX ORDER — DIPHENHYDRAMINE HCL 25 MG
50 CAPSULE ORAL ONCE
Status: DISCONTINUED | OUTPATIENT
Start: 2024-03-06 | End: 2024-03-06

## 2024-03-06 RX ADMIN — MOMETASONE FUROATE AND FORMOTEROL FUMARATE DIHYDRATE 2 PUFF: 100; 5 AEROSOL RESPIRATORY (INHALATION) at 08:07

## 2024-03-06 RX ADMIN — COLCHICINE 0.6 MG: 0.6 TABLET ORAL at 20:13

## 2024-03-06 RX ADMIN — TRAMADOL HYDROCHLORIDE 12.5 MG: 50 TABLET ORAL at 12:42

## 2024-03-06 RX ADMIN — COLCHICINE 0.6 MG: 0.6 TABLET ORAL at 08:49

## 2024-03-06 RX ADMIN — LEVOTHYROXINE SODIUM 100 MCG: 0.1 TABLET ORAL at 06:09

## 2024-03-06 RX ADMIN — ACETAMINOPHEN 650 MG: 325 TABLET ORAL at 08:46

## 2024-03-06 RX ADMIN — Medication 100 MCG: at 08:46

## 2024-03-06 RX ADMIN — PREDNISONE 50 MG: 50 TABLET ORAL at 16:00

## 2024-03-06 RX ADMIN — SILDENAFIL 20 MG: 20 TABLET, FILM COATED ORAL at 15:43

## 2024-03-06 RX ADMIN — PREDNISONE 50 MG: 50 TABLET ORAL at 22:28

## 2024-03-06 RX ADMIN — Medication 1000 UNITS: at 08:45

## 2024-03-06 RX ADMIN — PREDNISONE 20 MG: 20 TABLET ORAL at 08:45

## 2024-03-06 RX ADMIN — AMLODIPINE BESYLATE 10 MG: 10 TABLET ORAL at 08:45

## 2024-03-06 RX ADMIN — SILDENAFIL 20 MG: 20 TABLET, FILM COATED ORAL at 08:46

## 2024-03-06 RX ADMIN — ACETAMINOPHEN 650 MG: 325 TABLET ORAL at 22:28

## 2024-03-06 RX ADMIN — SILDENAFIL 20 MG: 20 TABLET, FILM COATED ORAL at 20:12

## 2024-03-06 RX ADMIN — ONDANSETRON 4 MG: 2 INJECTION INTRAMUSCULAR; INTRAVENOUS at 12:35

## 2024-03-06 RX ADMIN — MUPIROCIN 1 APPLICATION: 20 OINTMENT TOPICAL at 08:47

## 2024-03-06 RX ADMIN — LIDOCAINE 1 PATCH: 4 PATCH TOPICAL at 09:08

## 2024-03-06 RX ADMIN — TIOTROPIUM BROMIDE INHALATION SPRAY 2 PUFF: 3.12 SPRAY, METERED RESPIRATORY (INHALATION) at 08:07

## 2024-03-06 RX ADMIN — MOMETASONE FUROATE AND FORMOTEROL FUMARATE DIHYDRATE 2 PUFF: 100; 5 AEROSOL RESPIRATORY (INHALATION) at 22:28

## 2024-03-06 RX ADMIN — TRAMADOL HYDROCHLORIDE 12.5 MG: 50 TABLET ORAL at 02:21

## 2024-03-06 ASSESSMENT — COGNITIVE AND FUNCTIONAL STATUS - GENERAL
HELP NEEDED FOR PERSONAL GROOMING: 3 - A LITTLE
HELP NEEDED FOR BATHING: 2 - A LOT
WALKING IN HOSPITAL ROOM: 3 - A LITTLE
DRESSING REGULAR LOWER BODY CLOTHING: 1 - TOTAL
CLIMB 3 TO 5 STEPS WITH RAILING: 1 - TOTAL
MOVING TO AND FROM BED TO CHAIR: 2 - A LOT
CLIMB 3 TO 5 STEPS WITH RAILING: 1 - TOTAL
WALKING IN HOSPITAL ROOM: 2 - A LOT
EATING MEALS: 3 - A LITTLE
STANDING UP FROM CHAIR USING ARMS: 2 - A LOT
AFFECT: WFL;ANXIOUS
CLIMB 3 TO 5 STEPS WITH RAILING: 2 - A LOT
AFFECT: WFL;ANXIOUS
WALKING IN HOSPITAL ROOM: 2 - A LOT
MOVING TO AND FROM BED TO CHAIR: 2 - A LOT
TOILETING: 1 - TOTAL
STANDING UP FROM CHAIR USING ARMS: 2 - A LOT
STANDING UP FROM CHAIR USING ARMS: 3 - A LITTLE
MOVING TO AND FROM BED TO CHAIR: 3 - A LITTLE
DRESSING REGULAR UPPER BODY CLOTHING: 2 - A LOT

## 2024-03-06 NOTE — PLAN OF CARE
Care Coordination Discharge Plan Note     Discharge Needs Assessment  Concerns to be Addressed: discharge planning  Current Discharge Risk: physical impairment, chronically ill    Anticipated Discharge Plan  Anticipated Discharge Disposition: home with assistance, home with home health  Type of Home Care Services: nursing, home PT, home OT    Patient Choice  Offered/Gave Vendor List: yes  Patient's Choice of Community Agency(s): TBD    Patient and/or patient guardian/advocate was made aware of their right to choose a provider. A list of eligible providers was presented and reviewed with the patient and/or patient guardian/advocate in written and/or verbal form. The list delineates providers in the patient’s desired geographic area who are participating in the Medicare program and/or providers contracted with the patient’s primary insurance. The Medicare list and quality ratings were obtained from the Medicare.gov [medicare.gov] website.    ---------------------------------------------------------------------------------------------------------------------    Interdisciplinary Discharge Plan Review:  Participants:nursing, physical therapy, , occupational therapy, physician, dietitian/nutrition services, patient, social work/services    Concerns Comments: Tentative D/C plan home with home care services. Offered pt a choice of home care providers. Pt wants to consider home care services. RN Care Coordinator will follow pt for D/C planning and support. Will monitor for home care, DME, home infusion, transportation, community resources, pharmacy pricing, and assist with transitions of care. D/C planning barriers: N/A.    Discharge Plan:   Disposition/Destination: Home  /      Discharge Transportation:  Is Out of Hospital DNR needed at Discharge: no  Does patient need discharge transport? No

## 2024-03-06 NOTE — PLAN OF CARE
Problem: Adult Inpatient Plan of Care  Goal: Plan of Care Review  Outcome: Progressing  Flowsheets (Taken 3/6/2024 9210)  Progress: improving  Outcome Evaluation: PT eval complete. Rec home w/ 24/7 assist and home PT  Plan of Care Reviewed With: patient  Goal: Patient-Specific Goal (Individualized)  Outcome: Progressing     Problem: Mobility Impairment  Goal: Optimal Mobility  Outcome: Progressing

## 2024-03-06 NOTE — HOSPITAL COURSE
Pta antihypertensves Amlodipine 10 + 5-10prn   .  metop tart, ?hCTZ      Arielle Felix is a 63 year old female with a PMH of cutaneous systemic scleroderma (dx 1998), pulmonary hypertension (WHO Group 1, on mecitentan and sildenafil), ILD, Raynaud's disease, PE (3/2023, not on AC due to epistaxis and hemoptysis), GERD and HTN who presented with worsening shortness of breath and chest pain for 24 hours on 3/3/24 and was admitted for treatment of pericardial effusion, interstitial lung disease, and hypoxia.     ED Summary:   Vitals: T 97.9, , RR 24, /67, SpO2 90% on RA with improvement to 96% on 1 L nasal cannula  Labs: Na 135, , AST 11, ALT 5, albumin 3.2, Tbili 0.2, lactate 1.1, troponin 12.6, WBC 8.06, Hgb 9,Plt 217, D-dimer 0.68,   UA: trace leukocyte esterase, 1+ protein, 4-10 WBCs, rare squamous epithelial cells  Imaging:  CXR: Stable cardiomegaly with diffuse chronic interstitial processes revisualized in the lungs lower lung field prominence no definite superimposed infiltrate identified  X-ray foot bilateral:  No osseous destruction to suggest osteomyelitis  CTA PE: No evidence of PE, moderate to large pericardial effusion, lower lobe lung field predominant interstitial thickening with relative subpleural sparing consistent with chronic ILD unchanged from prior study, continued bilateral axillary mediastinal and bilateral hilar lymphadenopathy not definitely changed from prior, emphysema  Interventions: 40 mg of Methylpred, 50 mg IVBenadryl  Patient was admitted to the ICU under the cardiology service.     #Moderate to large pericardial effusion   #Pericarditis  Patient presented with increasing sub-sternal chest pressure of 1 days duration. Pain was initially non-radiating. EKG and troponin not concerning for ACS. Endorsed GERD symptoms however no new nausea/vomiting/diarrhea. CT PE negative for PE but did show moderate to large pericardial effusion. TTE on 3/4 showed moderate  circumferential pericardial effusion, mild LVH, EF 60%, grade II diastolic dysfunction, and no regional wall motion abnormalities or evidence of tamponade. BP remained stable ~130's systolic. TB panel negative. Blood cultures no growth to date. Given extensive list of allergies, allergist was consulted and recommended against using indomethacin to treat her pericarditis given history of shortness of breath with asiprin and ibuprofen. She was started on colchicine 0.6 mg BID on 3/4, and when she did not improve sympomatically prednisone 20 mg daily was added on 3/5. Patient reported new left shoulder pain during hospital stay. Multimodal pain regimen including tylenol, lidocaine patch, tramadol, hot packs used throughout stay. Colchicine was decreased to 0.6 mg once daily due to diarrhea.    - Continue Prednisone 20 mg for a total two week course (ending on 3/19/24). Will taper with Dr. Arvizu as an outpatient.      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 Also noted continued bilateral axillary, mediastinal, and bilateral hilar LAD unchaned from prior study and possibly related to the patient's sarcoid. Patient was continued on home sildenafil and  Macitentin. She was continued on home amlodipine for Raynaud's.    #CAD  Coronary CTA showed 2 vessel moderate CAD. With a coronary calcium score of 313 at the 96th percentile. She was begun on 20 mg atorvastatin and 75 mg plavix since she is allergic to aspirin.      #Acute on chronic diastolic heart failure   TTE on 3/4 showed LVH, LVEF 60%, grade II diastolic dysfunction, and no regional wall motion abnormalities.     #History of Pulmonary embolism  Not on AC due to epistaxis and hemoptysis. SCDs for DVT ppx given hemoptysis.     #Hypothyroidism  Continued home  synthroid. TSH was 0.28, free T4 1.07. No changes to dosing was made.     #Foot Ulcers/ Open wound of Right foot  Known history of bilateral LE ulcers and raynauds. Foot ulcers present on arrival.  Medial right and Left medial ankles and left medial great toe and tender to touch. Plain film of bilateral feet show no evidence of acute infection. Wound care consulted. Waffle boots and no sting skin barrier applied daily.      #Asymptomatic bacteruria  Urine culture grew proteus mirabilis. Patient without urinary symptoms.    Important issues to address in follow-up  - PCP for antihypertensive regimen  - Cardiology for CAD and anticoagulation  - Rheumatology for scleroderma  - Wound care for foot ulcers  - Pulmonology for pulmonary hypertension    Exam on day of discharge  ***    Discharge instructions  Dear Ms. Felix,    You came to [facility] on 3/3/24 with shortness of breath and chest pain.     You were found to have pericarditis, which is inflammation of the tissue surrounding your heart. You were treated with colchicine and prednisone. You will continue taking colchicine 0.3 mg once per day. You will continue taking prednisone 20 mg daily until 3/19/24. Your cardiologist, Dr. Arvizu, will instruct you on how to taper off of the prednisone.     You were also found to have coronary artery disease. To treat this, we started you on atorvastatin, a medication that helps your cholesterol and prevents cardiovascular disease and stroke. We also wanted to start you on clopidogrel, a medication that prevents blood clots. Since you have had bleeding with blood thinners in the past, you wanted to discuss this medication further with your cardiologist, Dr. Arvizu.     Please bring this discharge paperwork to all of your follow up appointments. If you experience recurrence of your symptoms, please do not hesitate to call your primary care doctor or present to the emergency department for evaluation. It was a  pleasure taking care of you at [facility]! We wish you the best of health.     Medications:  Please START taking atorvastatin 20 mg daily.  Please START taking colchicine 0.6 mg daily.  Please START taking prednisone 20 mg daily until 3/19/24. Your cardiologist, Dr. Arvizu, will instruct you how to taper the medication.     Follow-up:  Please follow-up with your Primary Care Provider, Dr. Sara Simmons. Make an appointment by calling [phone].    Please follow-up with your cardiologist, Dr. Arvizu. Make an appointment by calling [phone].    Please follow-up with your pulmonologist, Dr. Kai Wasserman. Make an appointment by calling [phone].    Please follow-up with [facility]. Make an appointment by calling [phone].

## 2024-03-06 NOTE — NURSING NOTE
Pt admitted to unit w/o incident.   Pt belongings include clothing cell and . Rx placed in pt's bin post pharm verification. Pt states that he has advanced directive and that it is on file. Confirmed pt wants to be full code.   Oriented to room and safety procedure.   Ordered dinner.

## 2024-03-06 NOTE — PROGRESS NOTES
Occupational Therapy -  Initial Evaluation     Patient: Arielle GUAJARDO Isidro  Location: [facility] 4007  MRN: [id number]  Today's date: 3/6/2024    HISTORY OF PRESENT ILLNESS     Arielle is a 63 y.o. female admitted on 3/3/2024 with Pericardial effusion [I31.39]  Interstitial lung disease (CMS/HCC) [J84.9]  Hypoxia [R09.02]. Principal problem is Pericardial effusion.    Past Medical History  Arielle has a past medical history of De Quervain's tenosynovitis, Essential (primary) hypertension, Follicular carcinoma of thyroid (CMS/HCC), Gastro-esophageal reflux, Hand ulceration (CMS/HCC), Hyperlipidemia, unspecified, Interstitial lung disease (CMS/HCC), Leg ulcer (CMS/HCC), Lung nodule, Lupus (CMS/HCC), Osteomyelitis of hand (CMS/HCC), Osteoporosis, Pulmonary hypertension (CMS/HCC), Raynaud's disease, Reflux esophagitis, Scleroderma (CMS/HCC), and Screening mammogram for breast cancer (05/04/2021).    History of Present Illness  Patient presented with SOB and CP, found to have pericardial effusion on imaging. Of note, patient has chronic b/l foot wounds and digital amputations. S/p TTE 3/4.     XR foot 3/4: negative for OM      PRIOR LEVEL OF FUNCTION AND LIVING ENVIRONMENT     Prior Level of Function    Flowsheet Row Most Recent Value   Dominant Hand right   Ambulation independent   Transferring independent   Toileting independent   Bathing assistive equipment and person   Dressing assistive person   Eating independent   IADLs assistive person   Driving/Transportation friends/family   Prior Level of Function Comment Caregivers through an agency 24/7 days a week, assist with all BADLs and IADLs   Assistive Device Currently Used at Home stair glide, shower chair        Prior Living Environment    Flowsheet Row Most Recent Value   People in Home alone   Current Living Arrangements home   Home Accessibility stairs within home (Group), stairs to enter home (Group)   Living  Environment Comment 2SH with 4 LARRY + rail, stair glide to bed/bath (tub shower). No DC.          VITALS AND PAIN     OT Vitals    Date/Time Pulse HR Source Resp SpO2 Pt Activity O2 Therapy BP MAP BP Location BP Method Pt Position [facility]   03/06/24 1000 -- -- 28 -- -- -- -- -- -- -- -- AD   03/06/24 1000 78 -- -- 94 % At rest None (Room air) 129/60 87 mmHg Right upper arm Automatic Lying DW   03/06/24 1014 88 Monitor -- 94 % At rest None (Room air) 144/66 -- Right upper arm Automatic Sitting DW           Objective   OBJECTIVE     Start time:  0956  End time:  1023  Session Length: 27 min  Mode of Treatment: occupational therapy, co-treatment    General Observations  Patient received upright, in bed. She was agreeable to therapy, no issues or concerns identified by nurse prior to session.      Precautions: fall       Limitations/Impairments: sensory (hypersensitive to touch, hx Raynaud's with prior digital amputations)      OT Eval and Treat - 03/06/24 0956        Cognition    Orientation Status oriented x 4     Affect/Mental Status WFL;anxious     Behavioral Issues difficulty managing stress     Follows Commands WFL     Cognitive Function WFL     Comment, Cognition Pleasant and cooperative. Anxious in relation to upright mobility 2/2 pain and hypersensitivity of skin. Aware of medical and functional status. Expresses needs appropriately.        Vision Assessment/Intervention    Vision Assessment WFL        Hearing Assessment    Hearing Status WFL        Sensory Assessment    Sensation Impaired Location(s) left UE;right UE;left LE;right LE   hx Raynaud's. Hypersensitive to feet and hands       Upper Extremity Assessment    General Observations L shoulder AROM limited by pain. R shoulder AROM WFL. B hands w/ digital amputations on all digits and skin hardening 2/2 scleroderma which severely restricts ROM        Lower Extremity Assessment    General Observations AROM grossly WFL. Able to weight bear. MMT deferred 2/2 pain  in B feet (chronic wounds)        Motor Skills    Coordination fine motor deficit;bilateral;upper extremity;severe impairment;opposition     Muscle Tone left;upper extremity(s);spasticity   in biceps       Bed Mobility    Bed Mobility Activities right;supine to sit     [place] minimum assist (75% or more patient effort)     Safety/Cues verbal cues;technique;sequencing     Assistive Device bed rails;draw sheet;head of bed elevated     Comment OOB to R side with increased time to achieve upright position. Denied dizziness at EOB        Sit/Stand Transfer    Surface edge of bed     [place] minimum assist (75% or more patient effort)     Safety/Cues verbal cues;technique     Assistive Device none     Transfer Comments patient stood from EOB with forearm support (2/2 pain in feet). Patient deferred standing from chair again 2/2 pain. Patient would benefit from RW with forearm attachments (2/2 inability to grasp walker with hands)        Surface-to-Surface Transfers    Transfer Location bed to chair     Transfer Technique stand step     [place] minimum assist (75% or more patient effort)     Safety/Cues verbal cues;hand placement;technique     Assistive Device none     Transfer Comments patient took 4-5 steps during transfer with Francois and forearm support. Patient limited by pain in B feet        Toilet Transfer    Transfer Technique --   patient declined C trial when offered       Functional Mobility    Distance few steps at bedside     Functional Mobility [place] minimum assist (75% or more patient effort)     Safety/Cues verbal cues;hand placement;technique     Assistive Device none     Functional Mobility Comments few steps at bedside. Benefits from forearm support.        Lower Body Dressing    Tasks don;socks     [place] dependent (less than 25% patient effort)     Safety/Cues verbal cues;sequencing     Position supine     Adaptive Equipment none        Grooming    Tasks washes, rinses and  dries face;washes, rinses and dries hands     [place] supervision     Safety/Cues increased time to complete     Position supported sitting     Setup Assistance obtain supplies;open containers     Adaptive Equipment none     Comment patient used warm wipes to wash face and hands in chair        Toileting    Tasks --   dependent via purewick, declined use of commode when offered       Balance    Static Sitting Balance WFL;sitting, edge of bed     Dynamic Sitting Balance WFL;sitting, edge of bed     Sit to Stand Dynamic Balance mild impairment;supported;standing     Static Standing Balance mild impairment;supported;standing     Dynamic Standing Balance mild impairment;supported;standing     Comment, Balance Francois to stand and take steps at bedside        Impairments/Safety Issues    Impairments Affecting Function balance;coordination;endurance/activity tolerance;pain;sensation/sensory awareness     Functional Endurance Limited by pain in feet. Tolerated bed-->chair only                                Education Documentation  Self-Care, taught by Tegan Doan OT at 3/6/2024 12:56 PM.  Learner: Patient  Readiness: Acceptance  Method: Explanation  Response: Verbalizes Understanding, Needs Reinforcement  Comment: Role of OT, OT POC, ADL recommendations, positioning recommendations, d/c planning        Session Outcome  Patient upright, in chair, leg(s) elevated at end of session, chair alarm on, all needs met, call light in reach, personal items in reach. Nursing notified about patient's performance, patient's position, and patient's response to therapy/activity.    AM-PAC™ - ADL (Current Function)     Putting on/taking off regular lower body clothing 1 - Total   Bathing 2 - A Lot   Toileting 1 - Total   Putting on/taking off regular upper body clothing 2 - A Lot   Help for taking care of personal grooming 3 - A Little   Eating meals 3 - A Little   AM-PAC™ ADL Score 12      ASSESSMENT AND PLAN     Progress  Summary  OT eval complete. Patient required Francois to transfer OOB to chair. Patient required totalA for LBD and [place] for grooming tasks while sitting up. Patient limited by severe pain in B feet, pain in L shoulder, impaired FMC/strength in B hands and generalized deconditioning. Anticipate d/c home with caregiver assist and home OT once pain improves.    Patient/Family Therapy Goal Statement: decrease pain    OT Plan    Flowsheet Row Most Recent Value   Rehab Potential good, to achieve stated therapy goals at 03/06/2024 0956   Therapy Frequency 4 times/wk at 03/06/2024 0956   Planned Therapy Interventions activity tolerance training, adaptive equipment training, BADL retraining, functional balance retraining, occupation/activity based interventions, patient/caregiver education/training, strengthening exercise, transfer/mobility retraining at 03/06/2024 0956          OT Discharge Recommendations    Flowsheet Row Most Recent Value   OT Recommended Discharge Disposition home with assistance, home with home health at 03/06/2024 0956   Anticipated Equipment Needs if Discharged Home (OT) walker, front-wheeled  [with platform attachments] at 03/06/2024 0956               OT Goals    Flowsheet Row Most Recent Value   Transfer Goal 1    Activity/Assistive Device sit-to-stand/stand-to-sit, toilet, stand pivot at 03/06/2024 0956   [place] supervision required at 03/06/2024 0956   Time Frame by discharge at 03/06/2024 0956   Dressing Goal 1    Activity/Adaptive Equipment dressing skills, all at 03/06/2024 0956   [place] supervision required at 03/06/2024 0956   Time Frame by discharge at 03/06/2024 0956   Toileting Goal 1    Activity/Assistive Device toileting skills, all at 03/06/2024 0956   [place] supervision required at 03/06/2024 0956   Time Frame by discharge at 03/06/2024 0956   Grooming Goal 1    Activity/Assistive Device grooming skills, all at 03/06/2024 0956   [place] set-up required at 03/06/2024  0956   Time Frame by discharge at 03/06/2024 0956

## 2024-03-06 NOTE — PROGRESS NOTES
Physical Therapy -  Initial Evaluation     Patient: Arielle GUAJARDO Isidro  Location: [facility] 4007  MRN: [id number]  Today's date: 3/6/2024    HISTORY OF PRESENT ILLNESS     Arilele is a 63 y.o. female admitted on 3/3/2024 with Pericardial effusion [I31.39]  Interstitial lung disease (CMS/HCC) [J84.9]  Hypoxia [R09.02]. Principal problem is Pericardial effusion.    Past Medical History  Arielle has a past medical history of De Quervain's tenosynovitis, Essential (primary) hypertension, Follicular carcinoma of thyroid (CMS/HCC), Gastro-esophageal reflux, Hand ulceration (CMS/HCC), Hyperlipidemia, unspecified, Interstitial lung disease (CMS/HCC), Leg ulcer (CMS/HCC), Lung nodule, Lupus (CMS/HCC), Osteomyelitis of hand (CMS/HCC), Osteoporosis, Pulmonary hypertension (CMS/HCC), Raynaud's disease, Reflux esophagitis, Scleroderma (CMS/HCC), and Screening mammogram for breast cancer (05/04/2021).    History of Present Illness  Patient presented with SOB and CP, found to have pericardial effusion on imaging. Of note, patient has chronic b/l foot wounds and digital amputations. S/p TTE 3/4.     XR foot 3/4: negative for OM      PRIOR LEVEL OF FUNCTION AND LIVING ENVIRONMENT     Prior Level of Function    Flowsheet Row Most Recent Value   Dominant Hand right   Ambulation independent   Transferring independent   Toileting independent   Bathing assistive equipment and person   Dressing assistive person   Eating independent   IADLs assistive person   Driving/Transportation friends/family   Prior Level of Function Comment Caregivers through an agency 24/7 days a week, assist with all BADLs and IADLs   Assistive Device Currently Used at Home stair glide, shower chair        Prior Living Environment    Flowsheet Row Most Recent Value   People in Home alone   Current Living Arrangements home   Home Accessibility stairs within home (Group), stairs to enter home (Group)   Living  Environment Comment 2SH with 4 LARRY + rail, stair glide to bed/bath (tub shower). No IL.        VITALS AND PAIN     PT Vitals    Date/Time Pulse HR Source Resp SpO2 Pt Activity O2 Therapy BP MAP BP Location BP Method Pt Position [facility]   03/06/24 1000 -- -- 28 -- -- -- -- -- -- -- -- AD   03/06/24 1000 78 -- -- 94 % At rest None (Room air) 129/60 87 mmHg Right upper arm Automatic Lying DW   03/06/24 1014 88 Monitor -- 94 % At rest None (Room air) 144/66 -- Right upper arm Automatic Sitting DW           Objective   OBJECTIVE     Start time:  0958  End time:  1025  Session Length: 27 min  Mode of Treatment: co-treatment, physical therapy    General Observations  Patient received upright, in bed. She was agreeable to therapy, no issues or concerns identified by nurse prior to session.      Precautions: fall       Limitations/Impairments: sensory   Services  Do You Speak a Language Other Than English at Home?: no      PT Eval and Treat - 03/06/24 1000        Cognition    Orientation Status oriented x 4     Affect/Mental Status WFL;anxious     Behavioral Issues difficulty managing stress     Follows Commands WFL     Cognitive Function WFL     Comment, Cognition Pleasant and cooperative. Anxious with mobiliy 2/2 pain and sensitivity of skin. Follows commands appropriately and makes needs known        Vision Assessment/Intervention    Vision Assessment WFL        Hearing Assessment    Hearing Status WFL        Sensory Assessment    Sensory Assessment sensation intact except     Sensation Impaired Location(s) left LE;right LE     Sensory Subjective Reports hypersensitivity        Lower Extremity Assessment    LE Assessment ROM and Strength WFL     General Observations MMT deferred by pt 2/2 hypersensitivty in feet. AROM WFL. Functional strength WFL        Bed Mobility    Bed Mobility Activities right;supine to sit     [place] minimum assist (75% or more patient effort);1 person assist     Safety/Cues increased  time to complete;minimal;hand placement;technique     Assistive Device bed rails;draw sheet;head of bed elevated     Comment OOB to R. Assist for trunk to upright, good LE initiation.        Mobility Belt    Mobility Belt Used for All Out of Bed Activity no     Reason Mobility Belt Not Used other (see comments)     Reason Mobility Belt Not Used Minimal OOB        Sit/Stand Transfer    Surface edge of bed     [place] minimum assist (75% or more patient effort);1 person assist     Safety/Cues increased time to complete;minimal;verbal cues;sequencing;technique;initiation     Assistive Device other (see comments)   HHA    Transfer Comments From EOB. Mildly unsteady related to pain in feet w/ WB. Slight posterior lean and trunk sway.        Surface-to-Surface Transfers    Transfer Location bed to chair     Transfer Technique stand step     [place] minimum assist (75% or more patient effort);1 person assist     Safety/Cues increased time to complete;minimal;verbal cues;initiation;sequencing     Assistive Device other (see comments)   hha    Transfer Comments 4-5 steps to chair. Mildly unsteady w/ support from therapist. cues for direction and sequencing. Limited diistance 2/2 pain in feet        Gait Training    [place], Gait not tested     Comment (Gait/Stairs) limited by pain in feet        Balance    Static Sitting Balance WFL;sitting, edge of bed     Dynamic Sitting Balance WFL;sitting, edge of bed     Sit to Stand Dynamic Balance mild impairment;supported     Static Standing Balance mild impairment;supported     Dynamic Standing Balance mild impairment;supported        Lower Extremity (Therapeutic Exercise)    Exercise Position/Type seated;AROM (active range of motion)     General Exercise bilateral;ankle pumps;LAQ (long arc quad);marching while seated     Range of Motion Exercises bilateral     Comment educated to perform while seated OOB. Verbalizes understanding        Impairments/Safety Issues     Impairments Affecting Function balance;coordination;endurance/activity tolerance;pain;sensation/sensory awareness     Functional Endurance Limited by pain in feet. VSS                                Education Documentation  Joint Mobility/Strength, taught by Andre Yancey PT at 3/6/2024  1:56 PM.  Learner: Patient  Readiness: Acceptance  Method: Explanation  Response: Verbalizes Understanding  Comment: progress mobility as tolerable, POC    Home Safety, taught by Andre Yancey PT at 3/6/2024  1:56 PM.  Learner: Patient  Readiness: Acceptance  Method: Explanation  Response: Verbalizes Understanding  Comment: progress mobility as tolerable, POC    Assistive/Adaptive Devices, taught by Andre Yancey PT at 3/6/2024  1:56 PM.  Learner: Patient  Readiness: Acceptance  Method: Explanation  Response: Verbalizes Understanding  Comment: progress mobility as tolerable, POC        Session Outcome  Patient reclined, in chair at end of session, chair alarm on, all needs met, call light in reach, personal items in reach. Nursing notified about patient's performance, patient's position, and patient's response to therapy/activity.    AM-PAC™ - Mobility (Current Function)     Turning form your back to your side while in flat bed without using bedrails 3 - A Little   Moving from lying on your back to sitting on the side of a flat bed without using bedrails 3 - A Little   Moving to and from a bed to a chair 3 - A Little   Standing up from a chair using your arms 3 - A Little   To walk in a hospital room 3 - A Little   Climbing 3-5 steps with a railing 2 - A Lot   AM-PAC™ Mobility Score 17      ASSESSMENT AND PLAN     Progress Summary  Min A x 1 for bed mobility and transfers. Limited by pain in feet especially when WB. Reports hypersensitivty/pain even at rest. Mildly unsteady with movement but demo's good strength. Pt reports having 24 HHA that can assist as needed. Rec home w/ assist and home PT once medically  stable for d/c. Will follow as needed    Patient/Family Therapy Goals Statement: To move better    PT Plan    Flowsheet Row Most Recent Value   Rehab Potential good, to achieve stated therapy goals at 03/06/2024 1000   Therapy Frequency 4 times/wk at 03/06/2024 1000   Planned Therapy Interventions balance training, bed mobility training, gait training, home exercise program, strengthening, stretching, transfer training at 03/06/2024 1000          PT Discharge Recommendations    Flowsheet Row Most Recent Value   PT Recommended Discharge Disposition home with home health, home with assistance at 03/06/2024 1000   Anticipated Equipment Needs if Discharged Home (PT) other (see comments), none  [bilateral platform RW?] at 03/06/2024 1000               PT Goals    Flowsheet Row Most Recent Value   Bed Mobility Goal 1    Activity/Assistive Device bed mobility activities, all at 03/06/2024 1000   [place] supervision required at 03/06/2024 1000   Time Frame by discharge at 03/06/2024 1000   Progress/Outcome goal ongoing at 03/06/2024 1000   Transfer Goal 1    Activity/Assistive Device sit-to-stand/stand-to-sit, bed-to-chair/chair-to-bed at 03/06/2024 1000   [place] supervision required at 03/06/2024 1000   Time Frame by discharge at 03/06/2024 1000   Progress/Outcome goal ongoing at 03/06/2024 1000   Gait Training Goal 1    Activity/Assistive Device gait (walking locomotion), decrease asymmetrical patterns, decrease fall risk, diminish gait deviation, improve balance and speed, increase endurance/gait distance, increase energy conservation at 03/06/2024 1000   [place] supervision required at 03/06/2024 1000   Distance 100 at 03/06/2024 1000   Time Frame by discharge at 03/06/2024 1000   Progress/Outcome goal ongoing at 03/06/2024 1000

## 2024-03-06 NOTE — HOSPITAL COURSE
Arielle is a 63 y.o. female admitted on 3/3/2024 with Pericardial effusion [I31.39]  Interstitial lung disease (CMS/HCC) [J84.9]  Hypoxia [R09.02]. Principal problem is Pericardial effusion.    Past Medical History  Arielle has a past medical history of De Quervain's tenosynovitis, Essential (primary) hypertension, Follicular carcinoma of thyroid (CMS/HCC), Gastro-esophageal reflux, Hand ulceration (CMS/HCC), Hyperlipidemia, unspecified, Interstitial lung disease (CMS/HCC), Leg ulcer (CMS/HCC), Lung nodule, Lupus (CMS/HCC), Osteomyelitis of hand (CMS/HCC), Osteoporosis, Pulmonary hypertension (CMS/HCC), Raynaud's disease, Reflux esophagitis, Scleroderma (CMS/HCC), and Screening mammogram for breast cancer (05/04/2021).    History of Present Illness  Patient presented with SOB and CP, found to have pericardial effusion on imaging. Of note, patient has chronic b/l foot wounds and digital amputations. S/p TTE 3/4.     XR foot 3/4: negative for OM

## 2024-03-06 NOTE — PLAN OF CARE
Problem: Adult Inpatient Plan of Care  Goal: Plan of Care Review  Outcome: Progressing  Goal: Patient-Specific Goal (Individualized)  Outcome: Progressing  Goal: Absence of Hospital-Acquired Illness or Injury  Outcome: Progressing  Goal: Optimal Comfort and Wellbeing  Outcome: Progressing  Goal: Readiness for Transition of Care  Outcome: Progressing     Problem: Infection  Goal: Absence of Infection Signs and Symptoms  Outcome: Progressing     Problem: Skin Injury Risk Increased  Goal: Skin Health and Integrity  Outcome: Progressing     Problem: Fall Injury Risk  Goal: Absence of Fall and Fall-Related Injury  Outcome: Progressing     Problem: Mobility Impairment  Goal: Optimal Mobility  Outcome: Progressing

## 2024-03-06 NOTE — PROGRESS NOTES
Internal Medicine  Transfer To/From Critical Care       SUMMARY OF HOSPITAL COURSE   This is a 63 y.o. year-old female who was admitted on 3/3/2024 with Pericardial effusion [I31.39]  Interstitial lung disease (CMS/HCC) [J84.9]  Hypoxia [R09.02] and is currently being treated for Pericardial effusion.      **THIS IS AN ICU TO FLOORS ACCEPTANCE NOTE**    This is a 63 y.o. female with a past medical history of cutaneous systemic scleroderma (dx 1998), pulmonary hypertension (WHO Group 1, on mecitentan and sildenafil), ILD, Raynaud's disease, PE (3/2023, not on AC due to epistaxis and hemoptysis), GERD and HTN who presents with worsening SOB and chest pain for 24hrs. The patient stated she's been having worsening SOB and CP for the last year but it acutely worsened in the last 24 hours. She states its pressure like, over her sternum, without radiation into her arms, and she does endorse periodic palpitations and night sweats with nausea, as well as continued epistaxis and hemoptysis however these are not associated with her pain. She stated the pain is worse with exertion, and denies any fever, chills, vomiting, or diarrhea    In the ED:  Vitals: T 97.9, , RR 24, /67, SpO2 90% on RA with improvement to 96% on 1 L nasal cannula  Labs: Na 135, , AST 11, ALT 5, albumin 3.2, Tbili 0.2, lactate 1.1, troponin 12.6, WBC 8.06, Hgb 9,Plt 217, D-dimer 0.68,   UA: trace leukocyte esterase, 1+ protein, 4-10 WBCs, rare squamous epithelial cells  Imaging:  CXR: Stable cardiomegaly with diffuse chronic interstitial processes revisualized in the lungs lower lung field prominence no definite superimposed infiltrate identified  X-ray foot bilateral:  No osseous destruction to suggest osteomyelitis  CTA PE: No evidence of PE, moderate to large pericardial effusion, lower lobe lung field predominant interstitial thickening with relative subpleural sparing consistent with chronic ILD unchanged from prior study,  continued bilateral axillary mediastinal and bilateral hilar lymphadenopathy not definitely changed from prior, emphysema  Interventions: 40 mg of Methylpred, 50 mg IVBenadryl  Patient was admitted to the ICU under the cardiology service.     #Moderate to large pericardial effusion   #Pericarditis  Patient presented with increasing sub-sternal chest pressure of 1 days duration. Pain was initially non-radiating. EKG and troponin not concerning for ACS. Endorsed GERD symptoms however no new nausea/vomiting/diarrhea. CT PE negative for PE but did show moderate to large pericardial effusion. TTE on 3/4 showed moderate circumferential pericardial effusion, mild LVH, EF 60%, grade II diastolic dysfunction, and no regional wall motion abnormalities or evidence of tamponade. BP remained stable ~130's systolic. TB panel negative. Blood cultures no growth to date. Given extensive list of allergies, allergist was consulted and recommended against using indomethacin to treat her pericarditis given history of shortness of breath with asiprin and ibuprofen. She was started on colchicine 0.6 mg BID on 3/4, and when she did not improve sympomatically prednisone 20 mg daily was added on 3/5. Patient reported new left shoulder pain during hospital stay. Multimodal pain regimen including tylenol, lidocaine patch, tramadol, hot packs used throughout stay.    - Continue colchicine 0.6 mg BID   - Continue Prednisone 20 mg for a total two week course. Will taper with Dr. Arvizu as an outpatient.   - F/u remaining pericardial effusion work up: Coxsackie, CMV qual  - Planned for coronary CTA on 3/6, will require steroid/benadryl prep due to contrast dye allergy  - Continue multimodal   - Cards to follow on floors     #Scleroderma w/ Raynaud's disease  #Interstitial lung disease secondary to sarcoidosis  #Pulmonary HTN  CT PE showed LLL predominant interstitial thickening with relative subpleural sparing, consistent with chronic ILD and  unchanged from prior study. Also noted continued bilateral axillary, mediastinal, and bilateral hilar LAD unchaned from prior study and possibly related to the patient's sarcoid. Patient was continued on home sildenafil and  Macitentin. She was continued on home amlodipine for Raynaud's.    - Continue pHTN medications   - Continue amlodipine     #Acute on chronic diastolic heart failure   TTE on 3/4 showed LVH, LVEF 60%, grade II diastolic dysfunction, and no regional wall motion abnormalities.     - Continue home medications     #History of Pulmonary embolism  Not on AC due to epistaxis and hemoptysis. SCDs for DVT ppx given hemoptysis.     #Hypothyroidism  Continued home synthroid      #Foot Ulcers/ Open wound of Right foot  Known history of bilateral LE ulcers and raynauds. Foot ulcers present on arrival.  Medial right and Left medial ankles and left medial great toe and tender to touch. Plain film of bilateral feet show no evidence of acute infection. Wound care consulted. Waffle boots and no sting skin barrier applied daily.     -Continue wound care, monitor for signs of infection    #Asymptomatic bacteruria  Urine culture grew proteus mirabilis. Patient without urinary symptoms.     -Monitor off abx        CODE STATUS      Their Code Status is Full Code      CURRENT MEDICATIONS     Current Facility-Administered Medications   Medication Dose Route Frequency   • acetaminophen  650 mg oral q6h PRN   • amLODIPine  10 mg oral q AM   • [Provider Managed Hold] amLODIPine  5-10 mg oral Nightly PRN   • cholecalciferol (vitamin D3)  1,000 Units oral Daily   • colchicine  0.6 mg oral BID   • cyanocobalamin  100 mcg oral Daily   • glucose  16-32 g of dextrose oral PRN    Or   • dextrose  15-30 g of dextrose oral PRN    Or   • glucagon  1 mg intramuscular PRN    Or   • dextrose 50 % in water (D50)  25 mL intravenous PRN   • levothyroxine  100 mcg oral Daily   • lidocaine  1 patch Topical Daily   • meclizine  25 mg oral 3x  "daily PRN   • mometasone-formoterol  2 puff inhalation BID (8a, 8p)    And   • tiotropium bromide  2 puff inhalation Daily (8a)   • mupirocin  1 Application Topical Daily   • ondansetron ODT  4 mg oral q8h PRN    Or   • ondansetron  4 mg intravenous q8h PRN   • predniSONE  20 mg oral Daily   • macitentan  10 mg oral Daily   • sildenafiL (pulm.hypertension)  20 mg oral TID       All inpatient medications were personally reviewed.     PERTINENT PHYSICAL EXAMINATION        PHYSICAL EXAM  Vitals: Visit Vitals  BP (!) 117/56   Pulse 78   Temp 36.8 °C (98.3 °F)   Resp (!) 24   Ht 1.676 m (5' 6\")   Wt 52.2 kg (115 lb 1.3 oz)   LMP  (LMP Unknown)   SpO2 96%   BMI 18.57 kg/m²      General: No acute distress, chronically ill-appearing   HENT: Normocephalic, moist mucous membranes, no lymphadenopathy   Eyes: Conjunctiva clear, no scleral icterus   Cardiac: Rate regular, rhythm regular, normal heart sounds, no JVD   Pulmonary: No respiratory distress, clear to auscultation bilaterally   Abdomen/: Soft, no distension, mild generalized TTP, normal bowel sounds    Skin:  Warm, dry. Bilateral foot wounds    Extremities: No peripheral edema. 2+ radial and DP pulses.   MSK: Normal bulk and tone   Neuro: Alert, oriented x3, at baseline, no focal deficits   Psych: Normal mood, normal affect, cooperative        SIGNIFICANT LABS / IMAGING      Labs  I have reviewed the patient's labs to the time of note. No new clinical concern.    Imaging  Reviewed    ECG/Telemetry  I have independently reviewed the telemetry. No events for the last 24 hours.    LAST ASSESSMENT AND PLAN      Interstitial lung disease (CMS/HCC)  Assessment & Plan  Patient has a PMH of ILD, CTEPH and phtn on sildenafil, mecetentan, and scleroderma   Has been discussing Tocilizumab with rheumatology but hasn't started it yet   Echo: EF 60%, No regional wall abnormalities, Grade II diastolic dysfunction  Continue pain meds as able     Impression: confirmed ILD and precap " phtn 2/2 scleroderma, pw worsening SHOB, cp, and new pericardial effusion     - continue home meds   - cont PTA amlodipine   - continue phtn meds as able   - asked family to bring in mecitentan for patient; otherwise may evaluate cards office for samples       Pulmonary hypertension (CMS/HCC)  Assessment & Plan  Patient has a pMH of pulmonary htn, scleroderma, CTEPh and precapillary pHTN   Sees Dr. Arvizu   Multiple echos and RHC and LHC   Endorses continued worsening SHOB since 10/2022 with BOWSER, palpitations, bendopnea   3/4: Echo: EF 60%, No regional wall abnormalities, Grade II diastolic dysfunction  Most recent echo 6/2023 showed EF 60%, mild TR, RVSP 55, increased RV systolic pressures compared to April   Had a PE in march 2023, was on elequis however had hemoptysis and it was discontinued   VQ in sept 2023 showed mod-high prob of PE disease and CTA PE showed no acute filling defect but enlargmeent of PA cw PHTN    recently started mecitentan aug/sept 2023 wo much subjective change     Ddx: multifactorial phtn scleroderma ILD vs. CTEPH vs. Pericardial effusion vs. Combo thereof     Impression: 62yo lady with PMH of raynauds, phtn, cutaneous scleroderma, ILD, CTEPh pw worsening SHOB x 24 hours with new pericardial effusion seen on CT. Prior echos have shown increasing RVSP and worsening phtn, for which she is on sildenafil, mecitentan.     - continue sildenafil   - continue mecitentan   - FU cards cs   - closely monitor BP in ICU   - SCDs for DVT given hemoptysis            Hypothyroid  Assessment & Plan  Patient has a PMH of hypothyroidism 2/2 thyroidectomy for follicular carcinoma of the thyroid       - continue home thyroid medications     Acute on chronic heart failure with preserved ejection fraction (CMS/HCC)  Assessment & Plan  Patient has a PMH of HFPEF with EF 65%   Endorsing worsening HSOB, denies any ANGEL LUIS, UFD or medication complaince difficulty   CT shows pericardial effusion without pleural effusions  or pulmonary edema   Exam negative for ANGEL LUIS, crackles     Impression: 64 yo lady with PMH of scleroderma and PE, phtn on mecitentan and sildenafil pw worseing BOWSER and shob with new pericardial effusion on ct imaging, negative for pulmonary edema or other s/sxs of CHF exacerbation. Unlikely this is a CHF exacerbation.     - continue to monitor   - continue home medications     History of pulmonary embolism  Assessment & Plan  Patient has a PMH of phtn and PE tx w/ apixiban however cb epistaxis and hemoptysis   Stopped elequis in June 2023   pw worsening shob and new pericardial effusion   Continues to endorse hemoptysis and epistaxis worsening over last few days   Echo EF 60%, no regional wall motion abnormalities, grade II diastolic dysfunction    - scds for now for dvt ppx       Open wound of right foot  Assessment & Plan  Patient has multiple wounds noted on her feet bilaterally   Wound images from admission in the media tab   Is on CCB at home   States wounds have difficulty healing  Also now with poor nutritional status iso scleroderma and ILD     Ddx: nonhealing ulcers 2/2 autoimmune disease vs. 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 Has chronic nonhealing ulcers to her feet, most likely secondary to her scleroderma and poor nutritional status     - cont CCB  - wound ostomy consult   - consider pain management   - nutrition consult     * Pericardial effusion  Assessment & Plan  Ddx: autoimmune vs. Myocardial injury vs. Bacterial infection vs. Viral vs. Malignant   Patient has a PMH of phtn, scleroderma, CTEPH   pw increasing chest pressure x 1 day, without radiation into arms  Endorses continued GERD sxs however no new NVD  S/p Methylprednisolone x1 in ED   CT shows pericardial effusion   Echo: EF 60%, No regional wall abnormalities, Grade II diastolic  dysfunction  ; Trop 12.9   [id number] and CRP 8.9   MRSA Nares: Negative  TB panel negative 2/27   BCx: NGTD      - cont Colchicine 0.6 mg BID  - start low dose prednisone 20mg daily (as indomethacin alternative, pt reports allerges to ASA and Ibuprofen)   - Cardiac Cath, if patient can tolerate  - CMV qual   - TB panel 2/27 negative   - coxsackie FU qual   - CBC daily  - CMP daily   - scds for dvt ppx given hemooptysis   - Allergist recommendations appreciated  - VS frequently            PCP/KEY FAMILY/EMERGENCY CONTACTS      PCP:  Sara Simmons MD    Emergency Contact:  Extended Emergency Contact Information  Primary Emergency Contact: mela frenchveronica  Mobile Phone: [phone]  Relation: Daughter  Secondary Emergency Contact: Wagner Khan  Mobile Phone: [phone]  Relation: Daughter       IMMEDIATE NEEDS FOR FIRST DAY POST-TRANSFER      See above       VTE Assessment: Padua    Estimated discharge date: 3/8/2024     ATTENDING DOCUMENTATION  ALSO SEE ATTENDING ATTESTATION SECTION OF NOTE

## 2024-03-07 ENCOUNTER — APPOINTMENT (INPATIENT)
Dept: RADIOLOGY | Facility: HOSPITAL | Age: 64
DRG: 315 | End: 2024-03-07
Attending: INTERNAL MEDICINE
Payer: COMMERCIAL

## 2024-03-07 LAB
ALBUMIN SERPL-MCNC: 3.3 G/DL (ref 3.5–5.7)
ALP SERPL-CCNC: 55 IU/L (ref 34–125)
ALT SERPL-CCNC: 7 IU/L (ref 7–52)
ANION GAP SERPL CALC-SCNC: 10 MEQ/L (ref 3–15)
AST SERPL-CCNC: 13 IU/L (ref 13–39)
BACTERIA UR CULT: ABNORMAL
BILIRUB SERPL-MCNC: 0.2 MG/DL (ref 0.3–1.2)
BUN SERPL-MCNC: 25 MG/DL (ref 7–25)
CALCIUM SERPL-MCNC: 8.9 MG/DL (ref 8.6–10.3)
CHLORIDE SERPL-SCNC: 107 MEQ/L (ref 98–107)
CMV DNA SPEC QL NAA+PROBE: NOT DETECTED
CO2 SERPL-SCNC: 20 MEQ/L (ref 21–31)
CREAT SERPL-MCNC: 0.9 MG/DL (ref 0.6–1.2)
EGFRCR SERPLBLD CKD-EPI 2021: >60 ML/MIN/1.73M*2
ERYTHROCYTE [DISTWIDTH] IN BLOOD BY AUTOMATED COUNT: 18.6 % (ref 11.7–14.4)
GLUCOSE BLD-MCNC: 110 MG/DL (ref 70–99)
GLUCOSE BLD-MCNC: 123 MG/DL (ref 70–99)
GLUCOSE BLD-MCNC: 129 MG/DL (ref 70–99)
GLUCOSE BLD-MCNC: 148 MG/DL (ref 70–99)
GLUCOSE SERPL-MCNC: 105 MG/DL (ref 70–99)
HCT VFR BLD AUTO: 29.9 % (ref 35–45)
HGB BLD-MCNC: 9 G/DL (ref 11.8–15.7)
MCH RBC QN AUTO: 26.1 PG (ref 28–33.2)
MCHC RBC AUTO-ENTMCNC: 30.1 G/DL (ref 32.2–35.5)
MCV RBC AUTO: 86.7 FL (ref 83–98)
PDW BLD AUTO: 11.5 FL (ref 9.4–12.3)
PLATELET # BLD AUTO: 277 K/UL (ref 150–369)
POCT TEST: ABNORMAL
POTASSIUM SERPL-SCNC: 5 MEQ/L (ref 3.5–5.1)
PROT SERPL-MCNC: 7.7 G/DL (ref 6–8.2)
RBC # BLD AUTO: 3.45 M/UL (ref 3.93–5.22)
SODIUM SERPL-SCNC: 137 MEQ/L (ref 136–145)
SPECIMEN SOURCE: NORMAL
WBC # BLD AUTO: 8.14 K/UL (ref 3.8–10.5)

## 2024-03-07 PROCEDURE — 84439 ASSAY OF FREE THYROXINE: CPT | Performed by: STUDENT IN AN ORGANIZED HEALTH CARE EDUCATION/TRAINING PROGRAM

## 2024-03-07 PROCEDURE — 63700000 HC SELF-ADMINISTRABLE DRUG

## 2024-03-07 PROCEDURE — 80053 COMPREHEN METABOLIC PANEL: CPT

## 2024-03-07 PROCEDURE — 25000000 HC PHARMACY GENERAL

## 2024-03-07 PROCEDURE — 63700000 HC SELF-ADMINISTRABLE DRUG: Performed by: INTERNAL MEDICINE

## 2024-03-07 PROCEDURE — 75574 CT ANGIO HRT W/3D IMAGE: CPT

## 2024-03-07 PROCEDURE — 21400000 HC ROOM AND CARE CCU/INTERMEDIATE

## 2024-03-07 PROCEDURE — 99233 SBSQ HOSP IP/OBS HIGH 50: CPT | Performed by: HOSPITALIST

## 2024-03-07 PROCEDURE — 84443 ASSAY THYROID STIM HORMONE: CPT | Performed by: STUDENT IN AN ORGANIZED HEALTH CARE EDUCATION/TRAINING PROGRAM

## 2024-03-07 PROCEDURE — 85027 COMPLETE CBC AUTOMATED: CPT

## 2024-03-07 PROCEDURE — 80061 LIPID PANEL: CPT

## 2024-03-07 PROCEDURE — 63700000 HC SELF-ADMINISTRABLE DRUG: Performed by: STUDENT IN AN ORGANIZED HEALTH CARE EDUCATION/TRAINING PROGRAM

## 2024-03-07 PROCEDURE — 99232 SBSQ HOSP IP/OBS MODERATE 35: CPT | Performed by: INTERNAL MEDICINE

## 2024-03-07 PROCEDURE — 63600105 HC IODINE BASED CONTRAST: Mod: JZ | Performed by: INTERNAL MEDICINE

## 2024-03-07 PROCEDURE — 36415 COLL VENOUS BLD VENIPUNCTURE: CPT

## 2024-03-07 PROCEDURE — 25000000 HC PHARMACY GENERAL: Performed by: INTERNAL MEDICINE

## 2024-03-07 PROCEDURE — 97530 THERAPEUTIC ACTIVITIES: CPT | Mod: GP,CQ

## 2024-03-07 RX ORDER — IOPAMIDOL 755 MG/ML
100 INJECTION, SOLUTION INTRAVASCULAR
Status: COMPLETED | OUTPATIENT
Start: 2024-03-07 | End: 2024-03-07

## 2024-03-07 RX ORDER — IVABRADINE 7.5 MG/1
15 TABLET, FILM COATED ORAL ONCE
Status: COMPLETED | OUTPATIENT
Start: 2024-03-07 | End: 2024-03-07

## 2024-03-07 RX ORDER — METOPROLOL TARTRATE 1 MG/ML
5 INJECTION, SOLUTION INTRAVENOUS AS NEEDED
Status: DISCONTINUED | OUTPATIENT
Start: 2024-03-07 | End: 2024-03-16 | Stop reason: HOSPADM

## 2024-03-07 RX ORDER — METOPROLOL TARTRATE 50 MG/1
50 TABLET ORAL EVERY 30 MIN PRN
Status: DISCONTINUED | OUTPATIENT
Start: 2024-03-07 | End: 2024-03-16 | Stop reason: HOSPADM

## 2024-03-07 RX ORDER — NITROGLYCERIN 0.4 MG/1
0.4 TABLET SUBLINGUAL ONCE
Status: COMPLETED | OUTPATIENT
Start: 2024-03-07 | End: 2024-03-07

## 2024-03-07 RX ORDER — METOPROLOL TARTRATE 50 MG/1
50 TABLET ORAL ONCE
Status: COMPLETED | OUTPATIENT
Start: 2024-03-07 | End: 2024-03-07

## 2024-03-07 RX ADMIN — AMLODIPINE BESYLATE 10 MG: 10 TABLET ORAL at 08:17

## 2024-03-07 RX ADMIN — METOPROLOL TARTRATE 50 MG: 50 TABLET, FILM COATED ORAL at 08:37

## 2024-03-07 RX ADMIN — SILDENAFIL 20 MG: 20 TABLET, FILM COATED ORAL at 16:26

## 2024-03-07 RX ADMIN — MOMETASONE FUROATE AND FORMOTEROL FUMARATE DIHYDRATE 2 PUFF: 100; 5 AEROSOL RESPIRATORY (INHALATION) at 08:20

## 2024-03-07 RX ADMIN — SILDENAFIL 20 MG: 20 TABLET, FILM COATED ORAL at 21:22

## 2024-03-07 RX ADMIN — LEVOTHYROXINE SODIUM 100 MCG: 0.1 TABLET ORAL at 06:33

## 2024-03-07 RX ADMIN — DIPHENHYDRAMINE HYDROCHLORIDE 50 MG: 50 CAPSULE ORAL at 11:09

## 2024-03-07 RX ADMIN — METOPROLOL TARTRATE 50 MG: 50 TABLET, FILM COATED ORAL at 10:00

## 2024-03-07 RX ADMIN — METOPROLOL TARTRATE 5 MG: 1 INJECTION, SOLUTION INTRAVENOUS at 12:15

## 2024-03-07 RX ADMIN — TIOTROPIUM BROMIDE INHALATION SPRAY 2 PUFF: 3.12 SPRAY, METERED RESPIRATORY (INHALATION) at 16:28

## 2024-03-07 RX ADMIN — Medication 1000 UNITS: at 08:18

## 2024-03-07 RX ADMIN — NITROGLYCERIN 0.4 MG: 0.4 TABLET SUBLINGUAL at 12:17

## 2024-03-07 RX ADMIN — IOPAMIDOL 100 ML: 755 INJECTION, SOLUTION INTRAVENOUS at 12:32

## 2024-03-07 RX ADMIN — MUPIROCIN 1 APPLICATION: 20 OINTMENT TOPICAL at 16:28

## 2024-03-07 RX ADMIN — IVABRADINE 15 MG: 7.5 TABLET, FILM COATED ORAL at 08:35

## 2024-03-07 RX ADMIN — COLCHICINE 0.6 MG: 0.6 TABLET ORAL at 21:22

## 2024-03-07 RX ADMIN — Medication 100 MCG: at 08:18

## 2024-03-07 RX ADMIN — MOMETASONE FUROATE AND FORMOTEROL FUMARATE DIHYDRATE 2 PUFF: 100; 5 AEROSOL RESPIRATORY (INHALATION) at 21:22

## 2024-03-07 RX ADMIN — COLCHICINE 0.6 MG: 0.6 TABLET ORAL at 08:19

## 2024-03-07 RX ADMIN — LIDOCAINE 1 PATCH: 4 PATCH TOPICAL at 08:21

## 2024-03-07 RX ADMIN — PREDNISONE 20 MG: 20 TABLET ORAL at 08:17

## 2024-03-07 ASSESSMENT — COGNITIVE AND FUNCTIONAL STATUS - GENERAL
AFFECT: ANXIOUS
CLIMB 3 TO 5 STEPS WITH RAILING: 2 - A LOT
MOVING TO AND FROM BED TO CHAIR: 2 - A LOT
STANDING UP FROM CHAIR USING ARMS: 2 - A LOT
MOVING TO AND FROM BED TO CHAIR: 2 - A LOT
CLIMB 3 TO 5 STEPS WITH RAILING: 2 - A LOT
WALKING IN HOSPITAL ROOM: 2 - A LOT
STANDING UP FROM CHAIR USING ARMS: 2 - A LOT
WALKING IN HOSPITAL ROOM: 2 - A LOT

## 2024-03-07 NOTE — STUDENT
Medical Student Note: For educational purposes only.  Do not use for coding or billing.     Medical Student Daily Progress Note    Subjective     Interval History: NAEON. Complaining of left shoulder pain.      Objective     Vital signs in last 24 hours:  Temp:  [36.3 °C (97.4 °F)-36.9 °C (98.4 °F)] 36.6 °C (97.9 °F)  Heart Rate:  [67-80] 72  Resp:  [16-24] 16  BP: (117-168)/(56-79) 168/79      Intake/Output Summary (Last 24 hours) at 3/7/2024 1342  Last data filed at 3/7/2024 1253  Gross per 24 hour   Intake 235 ml   Output --   Net 235 ml     Intake/Output this shift:  I/O this shift:  In: 230 [P.O.:220; I.V.:10]  Out: -     Physical Exam:  General appearance: alert, appears stated age, cachectic and cooperative  Head: normocephalic, without obvious abnormality, atraumatic  Lungs: CTAB  Heart: regular rate and rhythm, S1 normal, prominent S2  Abdomen: soft, non-tender; bowel sounds normal; no masses, no organomegaly  Extremities: both hands with thickened skin and amputated digits  Skin: thickened and tightened skin    Imaging  Coronary CTA today    VTE Assessment: TBD      Assessment/Plan     Expected Discharge Date:   3/8/2024    #Dispo: pending coronary CTA read. PT rec'd home with assistance    #Pericarditis with pericardial effusion  - continue colchicine 0.6 mg BID and prednisone 20 mg daily. Allergic to NSAIDs  - plan for prednisone taper outpatient with cardiology  - Coronary CTA read     #Scleroderma  #Raynauds phenomenon  #Pulmonary HTN  #Interstitial lung disease  - continue sildenafil, macitentan, amlodipine    #HFpEF  - continue amlodipine as above    #Hypothyroidism  - continue home levothyroxine    #Foot ulcers  - Wound care consult    #BMI 16.4  - nutrition consult    #Hx PE not on anticoagulation  #DVT ppx  - SCDs

## 2024-03-07 NOTE — PROGRESS NOTES
Internal Medicine  Daily Progress Note       SUBJECTIVE   This is a 63 y.o. year-old female admitted on 3/3/2024 with Pericardial effusion [I31.39]  Interstitial lung disease (CMS/HCC) [J84.9]  Hypoxia [R09.02].    Interval History: Patient reports pain all over most notable in there left shoulder, stomach and left arm. Complaining that her heating pad is not working. Not complaining of CP, SOB.      OBJECTIVE   Vital signs in last 24 hours:  Temp:  [36.3 °C (97.4 °F)-36.9 °C (98.4 °F)] 36.3 °C (97.4 °F)  Heart Rate:  [67-87] 72  Resp:  [16-31] 16  BP: (113-163)/(55-79) 163/71  SpO2:  [92 %-99 %] 98 %  Oxygen Therapy: Supplemental oxygen  O2 Delivery Method: Nasal cannula  O2 Flow Rate (L/min):  [2 L/min] 2 L/min    Weight & I/Os:  Weights (last 5 days)     Date/Time Weight Drug Calculation Weight (Dosing Weight)    03/07/24 0700 46.1 kg (101 lb 11.2 oz) --    03/06/24 0611 52.2 kg (115 lb 1.3 oz) --    03/05/24 0615 45.9 kg (101 lb 3.1 oz) --    03/04/24 0733 47.6 kg (105 lb) --    03/04/24 0000 48 kg (105 lb 13.1 oz) 48 kg (105 lb 13.1 oz)    03/03/24 1506 45.4 kg (100 lb) --          Intake/Output Summary (Last 24 hours) at 3/7/2024 0659  Last data filed at 3/6/2024 2000  24 Hour Net Input/Output from 7AM Yesterday   Intake 5 ml   Output 300 ml   Net -295 ml        PHYSICAL EXAMINATION   Physical Exam     LINES, CATHETERS, DRAINS, AIRWAYS, & WOUNDS   Lines, drains, airways, & wounds:  Peripheral IV (Adult) 03/03/24 Left Antecubital (Active)   Number of days: 4       Wound Other (comment) Anterior;Left Toe (Great) (Active)   Number of days: 3       Wound Pressure Injury Anterior;Left Ankle (Active)   Number of days: 3       Wound Pressure Injury Anterior;Right Ankle (Active)   Number of days: 3       Wound Other (comment) Anterior;Right Toe (2nd) (Active)   Number of days: 3        LABS, IMAGING, & TELE   Labs:  I have reviewed the patient's labs to the time of note. No new clinical concern.    Results from  last 7 days   Lab Units 03/07/24  0525 03/06/24  0616 03/05/24  0403   WBC K/uL 8.14 8.38 7.58   HEMOGLOBIN g/dL 9.0* 8.8* 8.4*   HEMATOCRIT % 29.9* 29.1* 28.2*   PLATELETS K/uL 277 247 225       Results from last 7 days   Lab Units 03/07/24  0525 03/06/24  0616 03/05/24  0403   SODIUM mEQ/L 137 138 137   POTASSIUM mEQ/L 5.0 4.5 4.4   CHLORIDE mEQ/L 107 107 107   CO2 mEQ/L 20* 24 23   BUN mg/dL 25 24 27*   CREATININE mg/dL 0.9 0.9 0.9   CALCIUM mg/dL 8.9 8.8 8.5*   ALBUMIN g/dL 3.3* 3.2* 3.1*   BILIRUBIN TOTAL mg/dL 0.2* 0.3 0.3   ALK PHOS IU/L 55 52 52   ALT IU/L 7 7 5*   AST IU/L 13 15 14   GLUCOSE mg/dL 105* 73 76     Imaging personally reviewed (does not include unread studies):  No results found.  Telemetry/EKG:  I have independently reviewed the telemetry. No events for the last 24 hours.     ASSESSMENT & PLAN   * Pericardial effusion  Assessment & Plan  Ddx: autoimmune vs. Myocardial injury vs. Bacterial infection vs. Viral vs. Malignant   Patient has a PMH of phtn, scleroderma, CTEPH   pw increasing chest pressure x 1 day, without radiation into arms  Endorses continued GERD sxs however no new NVD  S/p Methylprednisolone x1 in ED   CT shows pericardial effusion   Echo: EF 60%, No regional wall abnormalities, Grade II diastolic dysfunction  ; Trop 12.9   [id number] and CRP 8.9   MRSA Nares: Negative  TB panel negative 2/27   BCx: NGTD      - cont Colchicine 0.6 mg BID  - start low dose prednisone 20mg daily (as indomethacin alternative, pt reports allerges to ASA and Ibuprofen)   - Coronary CTA pending ability to control Pt HR.   - Cardiac Cath, if patient can tolerate  - CMV qual   - TB panel 2/27 negative   - coxsackie FU qual   - CBC daily  - CMP daily   - scds for dvt ppx given hemooptysis   - Allergist recommendations appreciated  - VS frequently     Hypothyroid  Assessment & Plan  Patient has a PMH of hypothyroidism 2/2 thyroidectomy for follicular carcinoma of the thyroid       - continue home  thyroid medications     Acute on chronic heart failure with preserved ejection fraction (CMS/[facility])  Assessment & Plan  Patient has a PMH of HFPEF with EF 65%   Endorsing worsening HSOB, denies any ANGEL LUIS, UFD or medication complaince difficulty   CT shows pericardial effusion without pleural effusions or pulmonary edema   Exam negative for ANGEL LUIS, crackles     Impression: 62 yo lady with PMH of scleroderma and PE, phtn on mecitentan and sildenafil pw worseing BOWSER and shob with new pericardial effusion on ct imaging, negative for pulmonary edema or other s/sxs of CHF exacerbation. Unlikely this is a CHF exacerbation.     - continue to monitor   - continue home medications     History of pulmonary embolism  Assessment & Plan  Patient has a PMH of phtn and PE tx w/ apixiban however cb epistaxis and hemoptysis   Stopped elequis in June 2023   pw worsening shob and new pericardial effusion   Continues to endorse hemoptysis and epistaxis worsening over last few days   Echo EF 60%, no regional wall motion abnormalities, grade II diastolic dysfunction    - scds for now for dvt ppx       Interstitial lung disease (CMS/[facility])  Assessment & Plan  Patient has a PMH of ILD, CTEPH and phtn on sildenafil, mecetentan, and scleroderma   Has been discussing Tocilizumab with rheumatology but hasn't started it yet   Echo: EF 60%, No regional wall abnormalities, Grade II diastolic dysfunction  Continue pain meds as able     Impression: confirmed ILD and precap phtn 2/2 scleroderma, pw worsening SHOB, cp, and new pericardial effusion     - continue home meds   - cont PTA amlodipine   - continue phtn meds as able   - asked family to bring in mecitentan for patient; otherwise may evaluate cards office for samples       Pulmonary hypertension (CMS/[facility])  Assessment & Plan  Patient has a pMH of pulmonary htn, scleroderma, CTEPh and precapillary pHTN   Sees Dr. Arvizu   Multiple echos and RHC and LHC   Endorses continued worsening SHOB since 10/2022  with BOWSER, palpitations, bendopnea   3/4: Echo: EF 60%, No regional wall abnormalities, Grade II diastolic dysfunction  Most recent echo 6/2023 showed EF 60%, mild TR, RVSP 55, increased RV systolic pressures compared to April   Had a PE in march 2023, was on elequis however had hemoptysis and it was discontinued   VQ in sept 2023 showed mod-high prob of PE disease and CTA PE showed no acute filling defect but enlargmeent of PA cw PHTN    recently started mecitentan aug/sept 2023 wo much subjective change     Ddx: multifactorial phtn scleroderma ILD vs. CTEPH vs. Pericardial effusion vs. Combo thereof     Impression: 64yo lady with PMH of raynauds, phtn, cutaneous scleroderma, ILD, CTEPh pw worsening SHOB x 24 hours with new pericardial effusion seen on CT. Prior echos have shown increasing RVSP and worsening phtn, for which she is on sildenafil, mecitentan.     - continue sildenafil today  - continue mecitentan   - FU cards cs   - closely monitor BP  - SCDs for DVT given hemoptysis           Open wound of right foot  Assessment & Plan  Patient has multiple wounds noted on her feet bilaterally   Wound images from admission in the media tab   Is on CCB at home   States wounds have difficulty healing  Also now with poor nutritional status iso scleroderma and ILD     Ddx: nonhealing ulcers 2/2 autoimmune disease vs. 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 Has chronic nonhealing ulcers to her feet, most likely secondary to her scleroderma and poor nutritional status     - cont CCB  - wound ostomy consult   - consider pain management   - nutrition consult                VTE Assessment: Padua    VTE Prophylaxis: Current anticoagulants:    •None      Code Status: Full Code  Estimated discharge date: 3/8/2024       ATTENDING DOCUMENTATION  ALSO SEE ATTENDING ATTESTATION  SECTION OF NOTE

## 2024-03-07 NOTE — PROGRESS NOTES
Heart Failure / Pulmonary Hypertension Progress Note    Interval History: Seen and examined sitting up in bed. She reports on-going chest, left shoulder, head, stomach and b/l foot pain. At rest, she denies shortness of breath or light-headedness.     Consult Background:   Ms. Felix is a 63 y.o. female with a past medical history of Pulmonary HTN, HTN, Raynaud's Disease, Cutaneous Systemic Scleroderma (dx 1998), ILD, PE (3/2023). She is a patient of Dr. Nguyễn. She was previously followed by Dr. Jos Valdez at [facility]". Echocardiogram from 4/21/2022 showed LVEF: 55-60%, mild aortic valve thickening, pulmonary artery systolic pressure: 52 mmHg and trivial pericardial effusion. LHC and RHC (separate occasions) over the last 5-10 years which showed normal hemodynamics and normal coronaries. She had a RHC 9/02/2020 showing top-normal right sided filling pressures with mild pulmonary hypertension, top-normal PVR and normal pulmonary capillary wedge pressure. The cardiac index was normal, seen below.     On 6/27/2022, she [place] in [facility] [place]. EKG: NSR without ischemic changes. hsTrop: 12->16, d-dimer: 0.56, CXR showed cardiomegaly and diffuse interstitial prominence.     Dr. Nguyễn ordered echocardiogram, which was completed on 8/22/2022 and showed estimated RVSP = 50-55 mmHg, normal-sized LV with normal LV systolic function. Estimated EF 55- 60%. Wall motion grossly normal, mild concentric left ventricular hypertrophy, grade I LV diastolic dysfunction, probably normal RV size and function.    At her initial visit on 10/11/2022, she reported that she felt very short of breath with minimal activity most of the time. She reports that this started about 1 year prior and had progressively gotten worse. She described PND and bendopnea. She reported that she experienced ankle swelling, worsened over the past year and usually worse towards the end of the night. She also reported some swelling in her abdomen. She  reported daily palpations. I recommended a RHC which was done 11/28/2022 and showed: Normal right sided filling pressures. Mildly elevated left sided filling pressures. Precapillary pulmonary hypertension with mean PA 36 mmHg.    She was hospitalized on 3/19/2023 at [facility] for PE diagnosed on V/Q scan. She reports that she had presented with left arm pain and heaviness. She states that she was started on Apixaban. One week after her last office visit (4/6/2023), [place] [facility] [place], epistaxis and hemoptysis. Echocardiogram showed: left ventricular cavity size normal with mild left ventricular hypertrophy and preserved systolic function. Estimated EF 65%. Chest CTA showed no evidence of PE; Apixaban was discontinued.     She had a repeat echocardiogram (6/2023) which showed: Normal left ventricular cavity size and mild concentric left ventricular hypertrophy. Normal systolic function. EF: 60%. Normal right ventricular structure and function. Mild tricuspid valve regurgitation with estimated RVSP: 55 mmHg. Compared to previous study from 4/14/2023, estimated right ventricular systolic pressure were higher.    At her last office visit on 7/25/2023, she reported that she was not sure if she received/started the Macitentan. She stated at the time we initiated it, her Pulmonologist also started her on a new medication (Mycophenolate), and she had significant GI intolerance with this. It was trialed at various doses but ultimately stopped. At that visit, I asked her to check if she had Macitentan and if she did to start it and monitor her SpO2.    She underwent V/Q scan in September 2023, which showed intermediate-to-high probability of pulmonary embolic disease, as a result she completed CTA Chest PE which showed: no evidence for filling defect or vessel cutoff to suggest pulmonary embolism. Enlargement of the pulmonary arteries compatible with pulmonary arterial hypertension was  "seen.    She started Macitentan around August or September 2023.    She was last seen in the office in February 2024.     [place] [facility] [place] chest pressure, palpitations, nausea. She was noted to have hypoxia and tachypnea at presentation. Given moderate to large pericardial effusion, she was monitored in CCU and transferred out on 3/6. hsTroponin was not elevated. She remained hemodynamically stable while in CCU. She has been started on Colchicine and steroids without relief of multi-system pain. She is planned for CCTA this AM. Hypoxia and tachypnea have resolved.     ---    Rheumatology: [facility] (\[facility]")     Past Medical / Surgical History:  Pulmonary HTN, HTN, Raynaud's Disease, Cutaneous Systemic Scleroderma (dx 1998), ILD, PE (3/2023), Follicular Carcinoma of Thyroid, Tenosynovitis, de Quervain, h/o Hernia Repair, h/o Appendicitis, h/o Digit Amputation, H/o Total Thyroidectomy (Dr. Pacheco at \[facility]"; 4/2013)    Family & Social History: She is , she has two children. She works as an .     Tobacco: former 2005  EtOH: never   Illicits: never     Her brother has CAD with stent  1st cousin with SLE  Both parents had \"heart problems\"    Allergies:   Allergies   Allergen Reactions   • Aspirin Shortness of breath     Other reaction(s): Unknown  \"stop breathing\"     • Ibuprofen Shortness of breath     Stop breathing   • Iodine Shortness of breath     Other reaction(s): Unknown   • Iodine And Iodide Containing Products Shortness of breath     ? rash   • Penicillins Shortness of breath     Other reaction(s): Unknown   • Sulfa (Sulfonamide Antibiotics) Angioedema   • Clindamycin    • Hydrochlorothiazide      Other reaction(s): Abdominal Pain   Slow heart rate   • Oxycodone      Other reaction(s): Vomiting   • Sulfamethoxazole-Trimethoprim      Other reaction(s): Vomiting, Weakness    • Latex Rash       Medications:   Current Facility-Administered Medications   Medication Dose Route Frequency Provider " Last Rate Last Admin   • acetaminophen (TYLENOL) tablet 650 mg  650 mg oral q6h PRN Patricia Haley DO   650 mg at 03/06/24 2228   • amLODIPine (NORVASC) tablet 10 mg  10 mg oral q AM Patricia Haley DO   10 mg at 03/07/24 0817   • [Provider Managed Hold] amLODIPine (NORVASC) tablet 5-10 mg  5-10 mg oral Nightly PRN Dee Arellano DO       • cholecalciferol (vitamin D3) tablet 1,000 Units  1,000 Units oral Daily Dee Arellano DO   1,000 Units at 03/07/24 0818   • colchicine (COLCRYS) tablet 0.6 mg  0.6 mg oral BID Patricia Haley DO   0.6 mg at 03/07/24 0819   • cyanocobalamin (VITAMIN B12) tablet 100 mcg  100 mcg oral Daily Dee Arellano DO   100 mcg at 03/07/24 0818   • glucose chewable tablet 16-32 g of dextrose  16-32 g of dextrose oral PRN Dee Arellano, DO        Or   • dextrose 40 % oral gel 15-30 g of dextrose  15-30 g of dextrose oral PRN Dee Arellano DO        Or   • glucagon (GLUCAGEN) injection 1 mg  1 mg intramuscular PRN Dee Arellano DO        Or   • dextrose 50 % in water (D50) injection 12.5 g  25 mL intravenous PRN Dee Arellano DO       • diphenhydrAMINE (BENADRYL) capsule 50 mg  50 mg oral Once Wolf Bernard MD       • insulin lispro U-100 (HumaLOG) pen 3-5 Units  3-5 Units subcutaneous With meals & nightly Wolf Bernard MD       • levothyroxine (SYNTHROID) tablet 100 mcg  100 mcg oral Daily Dee Arellano DO   100 mcg at 03/07/24 0633   • lidocaine (ASPERCREME) 4 % topical patch 1 patch  1 patch Topical Daily Sherita Panchal MD   1 patch at 03/07/24 0821   • meclizine (ANTIVERT) chewable tablet 25 mg  25 mg oral 3x daily PRN Wagner Chen MD   25 mg at 03/05/24 1817   • metoprolol (LOPRESSOR) injection 5 mg  5 mg intravenous PRN Timothy Arvizu DO       • metoprolol tartrate (LOPRESSOR) tablet 50 mg  50 mg oral q30 min PRN  Timothy Arvizu, DO       • mometasone-formoterol (DULERA 100) 100-5 mcg/actuation inhaler 2 puff  2 puff inhalation BID (8a, 8p) Dee Arellano DO   2 puff at 03/07/24 0820    And   • tiotropium bromide (SPIRIVA RESPIMAT) 2.5 mcg/actuation inhaler 2 puff  2 puff inhalation Daily (8a) Dee Arellano DO   2 puff at 03/06/24 0807   • mupirocin (BACTROBAN) 2 % ointment 1 Application  1 Application Topical Daily Dee Arellano DO   1 Application at 03/06/24 0847   • nitroglycerin (NITROSTAT) SL tablet 0.4 mg  0.4 mg sublingual Once Timothy Arvizu DO       • ondansetron ODT (ZOFRAN-ODT) disintegrating tablet 4 mg  4 mg oral q8h PRN Dee Arellano DO        Or   • ondansetron (ZOFRAN) injection 4 mg  4 mg intravenous q8h PRN Dee Arellano DO   4 mg at 03/06/24 1235   • predniSONE (DELTASONE) tablet 20 mg  20 mg oral Daily Patricia Haley DO   20 mg at 03/07/24 0817   • predniSONE (DELTASONE) tablet 50 mg  50 mg oral q6h INT Wolf Bernard MD   50 mg at 03/06/24 2228   • Pt Own- Macitentan (OPSUMIT) tablet 10mg  10 mg oral Daily Dee Arellano DO   10 mg at 03/07/24 0822   • [Provider Managed Hold] sildenafiL (pulm.hypertension) (REVATIO) tablet 20 mg  20 mg oral TID Dee Arellano DO   20 mg at 03/06/24 2012       Review of Systems:   All other systems reviewed and are negative except as per HPI.    Physical Exam:     Wt Readings from Last 10 Encounters:   03/07/24 46.1 kg (101 lb 11.2 oz)   02/01/24 47.6 kg (105 lb)   07/25/23 50.8 kg (112 lb)   07/25/23 50.8 kg (112 lb)   06/21/23 51.3 kg (113 lb)   04/19/23 51.7 kg (113 lb 14.4 oz)   04/06/23 52.6 kg (116 lb)   01/18/23 54.4 kg (120 lb)   11/28/22 55.3 kg (122 lb)   10/07/22 56.2 kg (124 lb)       BP Readings from Last 5 Encounters:   03/07/24 (!) 144/67   02/01/24 120/70   07/25/23 130/80   07/25/23 134/80   06/21/23 132/85       Vitals:    03/07/24 0818   BP:    Pulse:  80   Resp: 18   Temp: 36.3 °C (97.4 °F)   SpO2: 98%       Body mass index is 16.41 kg/m².  Body surface area is 1.47 meters squared.    Constitutional: NAD, AAOx3  HENT: Normocephalic, atraumatic head, sclerae anicteric, no cervical lymphadenopathy, trachea midline  Cardiovascular: RRR, no murmurs, rubs or gallops, JVP: 8 cm H2O   cm H2O, no carotid bruits.  Respiratory: CTA bilateral lung fields, no wheezes, rales or rhonchi  GI: soft, non-tender/non-distended  Musculoskeletal / Extremities: no peripheral edema, +2 pulses bilateral radial, DP/PT arteries, skin tightening on face and fingertips  Skin: no rashes  Neuro / Psych: no focal deficits, CNII-XII grossly intact, appropriate, cooperative    Diagnostic Data:     Results from last 7 days   Lab Units 03/07/24  0525 03/06/24  0616 03/05/24  0403   SODIUM mEQ/L 137 138 137   POTASSIUM mEQ/L 5.0 4.5 4.4   CHLORIDE mEQ/L 107 107 107   CO2 mEQ/L 20* 24 23   BUN mg/dL 25 24 27*   CREATININE mg/dL 0.9 0.9 0.9   CALCIUM mg/dL 8.9 8.8 8.5*   ALBUMIN g/dL 3.3* 3.2* 3.1*   BILIRUBIN TOTAL mg/dL 0.2* 0.3 0.3   ALK PHOS IU/L 55 52 52   ALT IU/L 7 7 5*   AST IU/L 13 15 14   GLUCOSE mg/dL 105* 73 76     Results from last 7 days   Lab Units 03/07/24  0525 03/06/24  0616 03/05/24  0403   WBC K/uL 8.14 8.38 7.58   HEMOGLOBIN g/dL 9.0* 8.8* 8.4*   HEMATOCRIT % 29.9* 29.1* 28.2*   MCV fL 86.7 85.8 86.8   PLATELETS K/uL 277 247 225     Component      Latest Ref Rng 4/13/2023 2/27/2024 3/3/2024   BNP      <=100 pg/mL 105 (H)  82  119 (H)       Component      Latest Ref Rng 3/3/2024   High Sens Troponin I      <15.0 pg/mL 12.6    High Sens Troponin I       12.2      Component      Latest Ref Rng 3/4/2024   CRP      <7.00 mg/L 8.90 (H)       Component      Latest Ref Rng 3/4/2024   Sed Rate      0 - 30 mm/hr 119 (H)       Component      Latest Ref Rng 3/3/2024   D-Dimer, Quant      0.00 - 0.50 ug/mL FEU 0.68 (H)       Component      Latest Ref Rng 4/18/2023 9/27/2023   Sodium      134  - 144 mmol/L 139  140    Potassium, Bld      3.5 - 5.2 mmol/L 4.4  4.1    Chloride      96 - 106 mmol/L 108  107 (H)    CO2      20 - 29 mmol/L 23  20    BUN      8 - 27 mg/dL 26 (H)  18    Creatinine      0.57 - 1.00 mg/dL 1.1  0.96    Glucose      70 - 99 mg/dL 82  83    Calcium      8.7 - 10.3 mg/dL 9.1  9.5    eGFR      >59 mL/min/1.73 >60.0  67      Component      Latest [facility] 4/13/2023   Hemoglobin A1C      <5.7 % 4.4      Component      Latest [facility] 4/13/2023 4/18/2023   WBC      3.80 - 10.50 K/uL 7.12  6.95    Hemoglobin      11.8 - 15.7 g/dL 10.6 (L)  10.1 (L)    Hematocrit      35.0 - 45.0 % 34.3 (L)  33.0 (L)    MCV      83.0 - 98.0 fL 87.7  90.4    Platelets      150 - 369 K/uL 267  256        Component      Latest [facility] 4/14/2023   Triglycerides      30 - 149 mg/dL 44    Cholesterol      <=200 mg/dL 194    HDL      >=55 mg/dL 49 (L)    LDL Calculated      <=100 mg/dL 136 (H)    Non-HDL, Calculated      mg/dL 145    RISK      <=5.0  4.0       Component      Latest [facility] 4/13/2023   High Sens Troponin I      <15.0 pg/mL 23.0 (H)       Component      Latest [facility] 4/14/2023   Ferritin      11 - 250 ng/mL 75      Component      Latest [facility] 4/13/2023   TSH      0.34 - 5.60 mIU/L 4.71      Component      Latest [facility] 4/14/2023   Iron      35 - 150 ug/dL 29 (L)    TIBC      270 - 460 ug/dL 245 (L)    UIBC      180 - 360 ug/dL 216    Iron Saturation      15 - 45 % 12 (L)        Component      Latest [facility] 6/27/2022 4/13/2023   BNP      <=100 pg/mL 111 (H)  105 (H)                                     ---    ECG (3/3/2024, personally reviewed):   Sinus tachycardia   Nonspecific ST and T wave abnormality   HR: 129 bpm     ---    TTE (3/4/2024, personally reviewed):   • Normal-sized left ventricle. Mild left ventricular hypertrophy. Preserved systolic function. LVEF 60%. No regional wall motion abnormalities. Grade II diastolic dysfunction.  Normal global longitudinal strain (-20%).  • The aortic  valve is not well seen.  Cannot determine the number of cusps.  Sclerotic leaflets.  No aortic stenosis.  Trace aortic insufficiency.  • Normal sized aortic root.  The visualized portion of the ascending aorta is normal in size.  • The mitral valve leaflets are thickened. Mild mitral annular calcification. Trace mitral regurgitation.   • Mildly dilated left atrium.  • Normal-sized right ventricle. Normal right ventricular systolic function.  • Thickened tricuspid valve. There is mild tricuspid regurgitation with estimated RVSP of 46 mmHg.   • Pulmonic valve is not well visualized. Trace pulmonic regurgitation.   • Mildly dilated right atrium.  • IVC is enlarged with <50% respiratory variation consistent with elevated right atrial filling pressure (at least 15 mmHg).   • Moderate, circumferential, circumferential pericardial effusion.  The largest pocket is located inferolaterally.  Elevated right atrial pressure.  No other clear echocardiographic features of increased pericardial pressure.  Correlate clinically.   • Compared to previous TTE from 6/21/2023, pericardial effusion now moderate in size.  Right atrial pressure now elevated.       CTA Chest (3/3/2024):   IMPRESSION:  1.  No evidence of acute pulmonary embolism to the level of the segmental  branches.  2.  Moderate to large pericardial effusion measuring higher than simple fluid  density.  3.  Lower lobe lung field predominant interstitial thickening with some relative  subpleural sparing, consistent with a chronic interstitial lung disease,  unchanged from the prior study.  4.  Continued bilateral axillary, mediastinal, and bilateral hilar  lymphadenopathy, not definitely changed from the prior study, possibly related  to the patient's sarcoid.  5.  Emphysema.     CTA Chest PE (10/4/2023):  IMPRESSION:  1. No evidence for filling defect or vessel cutoff to suggest pulmonary  embolism.  Enlargement of the pulmonary arteries compatible with  pulmonary  arterial hypertension.  2. Changes throughout the lungs as described above which can be seen with  systemic sclerosis/scleroderma.  Underlying pneumonia the lung bases cannot be  entirely excluded in the proper clinical setting.  3.  Additional stable findings as described above.        VQ (9/12/2023):  IMPRESSION:  Intermediate to high probability of pulmonary embolic disease.     6MWT (7/25/2023, on Sildenafil 20 mg TID):        TTE (6/21/2023, personally reviewed):  1. Normal left ventricular cavity size and mild concentric left ventricular hypertrophy. Normal systolic function. EF: 60%. No regional wall motion abnormalities. Grade I diastolic dysfunction.   2. Aortic valve cusps not well visualized. Trace aortic regurgitation. Aortic valve and root sclerosis.  3. Thickened mitral valve leaflets. Mild mitral annular calcification. Trace mitral regurgitation. Mildly dilated left atrial size.   4. Normal right ventricular structure and function. Mild tricuspid valve regurgitation with estimated RVSP: 55 mmHg (assuming right atrial pressure of 3 mmHg). Normal right atrial size. Pulmonic valve not well visualized. Trace pulmonic valve regurgitation. Pulsed wave Doppler of the RVOT systolic profile show decreased acceleration times ( msec) and late systolic notching consistent with elevated PVR.   5. IVC size is normal with > 50% inspiratory collapse consistent with normal right atrial pressure. Small pericardial effusion without echocardiographic evidence of tamponade.  6. Compared to previous study from 4/14/2023, estimated right ventricular systolic pressure is higher.        TTE (4/14/2023, personally reviewed):   • The left ventricular cavity size is normal with mild left ventricular hypertrophy and preserved systolic function. Estimated EF 65%. No regional wall motion abnormalities. Grade I diastolic dysfunction.     • The aortic valve is tricuspid. Aortic valve sclerosis. Trace aortic  regurgitation.      • The visualized portions of the aortic root and ascending aorta are normal in size.     • The mitral valve is mildly thickened. Mild mitral annular calcification. Trace mitral regurgitation.       • The left atrium is severely dilated.      • The right ventricle is normal in size with preserved systolic function     • The pulmonic valve is not well seen.     • The tricuspid valve is normal in appearance. mild tricuspid regurgitation with an estimated RVSP of 34 mmHg.       • Right atrium is normal in size.     • The IVC is small and collapses > 50% with inspiration consistent with normal right atrial pressures.     • Small pericardial effusion, mostly posterior.       • Compared to previous echocardiogram from 8/22/2022, there is no significant change        TTE (11/28/2022, personally reviewed):   • Normal right- and mildly elevated left-sided filling pressures (RA: 3 mmHg, PCWP: 13 mmHg)  • Elevated pulmonary artery pressures (60/22(35 mmHg)) in the setting of PCWP: 13 mmHg.   • Normal cardiac output and index (CO: 5.1 L/min, CI: 3.2 L/min/m2)  • PVR: 4.3 Wood units. SVR: 1840 dynes/sec/cm5      TTE (8/22/2022, personally reviewed):  · Normal-sized LV. Normal LV systolic function. Estimated EF 55- 60%. Wall motion appears grossly normal. Mild concentric left ventricular hypertrophy. Grade I LV diastolic dysfunction.  · Pulmonic valve not well visualized. Grossly normal pulmonic valve structure. Trace pulmonic valve regurgitation. No pulmonic valve stenosis.  · Aortic valve not well visualized. Aortic valve not well seen but likely trileaflet. Focal aortic valve calcification present. Sclerotic aortic valve leaflets. Mild aortic valve regurgitation. No aortic valve stenosis.  · RV not well visualized. Normal-sized RV. Normal RV systolic function.  · Normal-sized RA.  · There is a small loculated pericardial effusion anterior to the right atrium. No cardiac tamponade.  · Sclerotic mitral valve.  Mitral valve calcification of the anterior leaflet present.  · Trace mitral valve regurgitation.  · No significant mitral valve stenosis.  · Normal-sized IVC. IVC demonstrates normal respiratory collapse.  · Tricuspid valve structure is grossly normal. Mild to moderate tricuspid valve regurgitation.  · Estimated RVSP = 50-55 mmHg.  · Mildly dilated LA.  · No previous echocardiogram available for comparison.     TTE (4/21/2022, outside study):  This result has an attachment that is not available.   •  A complete transthoracic echocardiogram (including 2D, color flow   Doppler, spectral Doppler and M-mode imaging) was performed using the   standard protocol. The study quality was adequate.   •  The left ventricle is normal in size. There is moderately increased   wall thickness consistent with moderate concentric hypertrophy.   Endocardium is incompletely visualized, but no regional wall motion   abnormalities are noted in the visualized segments. Normal left   ventricular ejection fraction. The left ventricular ejection fraction by   visual estimate is 55% to 60%.   •  The right ventricle is normal in size. There is normal function of the   right ventricle.   •  The left atrium is normal in size. Left atrial volume is 58 mL.   •  The right atrial volume measures 52 mL. The right atrial volume index   measures 34 mL/m2.   •  There is trace mitral regurgitation. There is no mitral stenosis.   •  The aortic valve is trileaflet. There is mild aortic valve thickening.   There is no aortic stenosis. There is trace aortic regurgitation.   •  There is mild to moderate tricuspid regurgitation. Pulmonary artery   systolic pressure measures 52 mmHg. The pulmonary artery systolic pressure   is moderately elevated.   •  The aorta measures 3.2 cm at the sinus of Valsalva. The proximal   segment of the ascending aorta is mildly enlarged. The proximal segment of   the ascending aorta measures 3.4 cm. The proximal segment of the  ascending   aorta measures 2.2 cm/m2, indexed for body surface area.   •  Trivial pericardial effusion present. There is no echocardiographic   evidence of cardiac tamponade.   •  The inferior vena cava is normal in size (diameter <21 mm) and   decreases >50% in size with inspiration, suggesting a normal right atrial   pressure of 3 mmHg (range 0-5 mm Hg).   •  Compared to the prior study of 3/13/2020, there are no significant   changes.        PFT (3/23/2022):      PFT (7/14/2021):      CT Chest (5/2/2021, outside study):  CLINICAL INFORMATION: Systemic sclerosis. Interstitial lung disease. Groundglass nodule     PROCEDURE: Unenhanced CT of the chest was performed using thin section reconstructions. Images were obtained on inspiration and expiration.     COMPARISON: June and August 2020     FINDINGS:     LUNGS, PLEURA:     Groundglass opacities with reticulation with subpleural sparing in the lower lobes, without honeycombing or architectural distortion, unchanged.     Right upper lobe groundglass nodule, image 114 of series 3, 8 x 11 mm, previously 6 mm.     Expiratory images are of diagnostic quality and do not demonstrate evidence of clinically significant air trapping.     CARDIOVASCULAR, MEDIASTINUM, THYROID: Coronary calcifications. Trace pericardial effusion.     LYMPH NODES: Subcentimeter mediastinal lymph nodes, unchanged. Unchanged axillary lymph nodes.     SKELETON, CHEST WALL: No destructive bone lesion     UPPER ABDOMEN: Patulous esophagus. 3 mm right renal stone.       RHC (9/02/2020):  RA   8 mmHg   RV   40/5 mmHg   PA (mean)  44/16 (25) mmHg   PCWP   12 mmHg   CO   4.65 lpm (Thermodilution)   CI   2.65 lpm/m-squared   SVI    33 ml/beat/m-squared (lower limit normal 29)   PVR   2.8 mmHg/l/min (Wood units)   SVR   2064 dyne-sec-cm-5         PFT (3/18/2020):      TTE (3/13/2020, outside study):  · The study quality was good.   · The left ventricle is normal in size. Top normal left ventricular wall    thickness. There are no segmental wall motion abnormalities. The left   ventricular ejection fraction is 55-60% by visual estimate and 3D   echocardiography. Normal left ventricular ejection fraction.   · There is grade I (mild) LV diastolic dysfunction.   · The right ventricle is normal in size. There is normal function of the   right ventricle.   · The right atrium is mildly dilated.   · There is mild mitral valve anterior leaflet thickening. There is mild   mitral valve leaflet calcification. There is flattening of the mitral   leaflets without raphael prolapse. There is trace mitral regurgitation.   · The aortic valve is trileaflet. There is mild thickening of the aortic   valve. There is mild calcification of the aortic valve. There is no aortic   /stenosis. There is trace aortic regurgitation.   · There is mild tricuspid valve leaflet thickening. There is mild to   moderate tricuspid regurgitation. The pulmonary artery systolic pressure   is moderately elevated. Pulmonary artery systolic pressure measures 50   mmHg. There is mid-systolic notching of the RVOT VTI consistent with   elevated pulmonary vascular resistance.   · The inferior vena cava is normal in size (diameter <21 mm) and decreases   >50% in size with inspiration, suggesting a normal right atrial pressure   of 3 mmHg (range 0-5 mm Hg).   · Trivial loculated pericardial effusion present adjacent to the right   ventricle and adjacent to the right atrium.          Assessment / Plan:     1. Musculoskeletal Pain Including Chest Pain  - She reports chest, left shoulder, head, stomach and foot pain.   - Suspect this is auto-immune disease related inflammation; although, there has not been demonstrable improvement with Colchicine or steroids to date. Agree with steroid course.   - Consideration for Rheumatology consultation. As an outpatient, revisiting immune system suppression through biologics (or non-biologics) is reasonable.   - CCTA reasonable but  likely low yield    2. Pericardial Effusion  - Moderate in size without clinical tamponade  - Will continue to monitor    3. Pulmonary Hypertension (WHO Group I, NYHA/WHO FC III):   - Occurring in the background of Systemic Scleroderma with ILD. August 2022 TTE showed normal RV size and function, but progressive exertional decline, worsened DLCO and resting tachycardia suggest there may be progression of her pulmonary vascular disease and limitation of cardiac output. This was proven with 11/2022 RHC showing mean PA 35 mmHg (with PCWP 13 mmHg) and PVR 4.3 Wood units. June 2023 TTE showed increase in RVSP. New TTE with RVSP decreased to 40s (even with higher estimated RAP).  - Continue uninterrupted Sildenafil and Macitentan. As discussed with team, Sildenafil doses should be re-timed today so that she gets all three.     4. HTN:   - Borderline elevated.   - Continued Amlodipine.     5. Systemic Scleroderma / Raynaud's Disease:   - Per Rheumatology (Outpatient: Dr. Giraldo).      6. ILD:   - Per Pulmonology and Rheumatology (Dr. Giraldo) outpatient.   - She has not tolerated trials of Mycophenolate.     7. PE:   - She has had elevated D-dimer and elevated probability on V/Q scans, but CTA Chest has consistently not shown evidence for acute or chronic thromboembolism. Apixaban has been discontinued.         Timothy Arvizu,

## 2024-03-07 NOTE — PROGRESS NOTES
Brief Nutrition Note    Recommendations     1. Regular diet  - change supplements to Ensure Plus Vanilla BID, Boost Plus vanilla once daily    2. Add MVI daily for dermal defense       Clinical Course: Patient is a 63 y.o. female who was admitted on 3/3/2024 with a diagnosis of Pericardial effusion [I31.39]  Interstitial lung disease (CMS/HCC) [J84.9]  Hypoxia [R09.02].     Past Medical History:   Diagnosis Date   • De Quervain's tenosynovitis    • Essential (primary) hypertension    • Follicular carcinoma of thyroid (CMS/HCC)    • Gastro-esophageal reflux    • Hand ulceration (CMS/HCC)    • Hyperlipidemia, unspecified    • Interstitial lung disease (CMS/HCC)    • Leg ulcer (CMS/HCC)    • Lung nodule    • Lupus (CMS/HCC)    • Osteomyelitis of hand (CMS/HCC)    • Osteoporosis    • Pulmonary hypertension (CMS/HCC)    • Raynaud's disease     Severe   • Reflux esophagitis    • Scleroderma (CMS/HCC)    • Screening mammogram for breast cancer 05/04/2021    BI-RADS category: 1 - Negative (care everywhere @ [place])     Past Surgical History:   Procedure Laterality Date   • CARDIAC CATHETERIZATION     • COLONOSCOPY     • HERNIA REPAIR              [facility] Nutrition Screen Tool  Has patient lost weight without trying?: 2-->Yes, 14-23 lbs  Has patient been eating poorly due to decreased appetite?: 1-->Yes  MST Nutrition Screen Score: 3     Nutrition/Diet History  Intake (%): 25%    Physical Findings  Overall Physical Appearance: loss of muscle mass, loss of subcutaneous fat, generalized wasting  Last Bowel Movement: 03/07/24  Skin: pressure injury (L toe-U, L ankle stage 2, R ankle stage 2)     RETS18 Physical Appearance  Overall Physical Appearance: loss of muscle mass, loss of subcutaneous fat, generalized wasting  Last Bowel Movement: 03/07/24  Skin: pressure injury (L toe-U, L ankle stage 2, R ankle stage 2)     Nutrition Order  Nutrition Order: meets nutritional requirements  Nutrition Order Comments: Regular with Ensure Plus  "vanilla TID     Anthropometrics  Height: 167.6 cm (5' 6\")           Current Weight  Weight Method: Bed scale (1 blanket and 1 pillow left on bed with weight)  Weight: 46.1 kg (101 lb 11.2 oz)     Ideal Body Weight (IBW)  Ideal Body Weight (IBW) (kg): 59.58  % Ideal Body Weight: 79.94            Body Mass Index (BMI)  BMI (Calculated): 16.4     Labs/Procedures/Meds  Lab Results Reviewed: reviewed, pertinent   BMP Results       03/07/24 03/06/24 03/05/24     0525 0616 0403     138 137    K 5.0 4.5 4.4    Cl 107 107 107    CO2 20 24 23    Glucose 105 73 76    BUN 25 24 27    Creatinine 0.9 0.9 0.9    Calcium 8.9 8.8 8.5    Anion Gap 10 7 7    EGFR >60.0 >60.0 >60.0         Comment for EGFR at 0525 on 03/07/24: Calculation based on the Chronic Kidney Disease Epidemiology Collaboration (CKD-EPI) equation refit without adjustment for race.    Comment for EGFR at 0616 on 03/06/24: Calculation based on the Chronic Kidney Disease Epidemiology Collaboration (CKD-EPI) equation refit without adjustment for race.    Comment for EGFR at 0403 on 03/05/24: Calculation based on the Chronic Kidney Disease Epidemiology Collaboration (CKD-EPI) equation refit without adjustment for race.            Medications  Pertinent Medications Reviewed: reviewed, pertinent   • amLODIPine  10 mg oral q AM   • cholecalciferol (vitamin D3)  1,000 Units oral Daily   • colchicine  0.6 mg oral BID   • cyanocobalamin  100 mcg oral Daily   • diphenhydrAMINE  50 mg oral Once   • insulin lispro U-100  3-5 Units subcutaneous With meals & nightly   • levothyroxine  100 mcg oral Daily   • lidocaine  1 patch Topical Daily   • mometasone-formoterol  2 puff inhalation BID (8a, 8p)    And   • tiotropium bromide  2 puff inhalation Daily (8a)   • mupirocin  1 Application Topical Daily   • nitroglycerin  0.4 mg sublingual Once   • predniSONE  20 mg oral Daily   • macitentan  10 mg oral Daily   • [Provider Managed Hold] sildenafiL (pulm.hypertension)  20 mg oral " TID     Estimated/Assessed Needs  Additional Documentation: Calorie Requirements (Group), Protein Requirements (Group)     Dosing weight 48kg    Skin: L toe -U, L ankle stage 2, R ankle stage 2    Clinical comments: Nutrition follow up and consult received for malnutrition. Full RD assessment 3/4.  Pt reports fair appetite. She prefers Ensure Plus over Boost Plus. PIs noted. Add MVI daily. Borderline hyperkalemia. Unlikely she is eating >2g K+ in her diet. Diet is appropriate. Encourage po intake.     Goals: Will eat >75% of meals  Monitor:  Appetite, weight, skin, labs    Recommendations: See above       Date: 03/07/24  Signature: Meena Catalan RD

## 2024-03-07 NOTE — PROGRESS NOTES
Staff present during Radiology Nurse encounter:    Nurse: SU Martin  Technologist: UMU Berry  Cardiologist: Dr. Lombardi  Ordering Physician: Dr. Chen  Clinical Indications: Chest pain/anginal equiv, intermediate CAD risk, not treadmill candidate      Cardiac CTA Worksheet    Chest pain (symptomatic patients):  Intermediate pre-test probability of Coronary Artery Disease    Ivabradine (CORLANOR) 15mg, metoprolol tartrate (LOPRESSOR) 5mg, metoprolol tartrate (LOPRESSOR) 100mg and nitroglycerin (NITROSTAT) .4mg     IV Access: #20 [place] Left AC  Contrast: Isovue-370 100 ML's    Scan: Retrospective  HR Final Run: 68/67/58/61/60/65/68

## 2024-03-07 NOTE — PLAN OF CARE
Problem: Adult Inpatient Plan of Care  Goal: Plan of Care Review  Outcome: Progressing   Regular diet and will adjust ONS for pt preference

## 2024-03-07 NOTE — PROGRESS NOTES
Physical Therapy -  Daily Treatment/Progress Note     Patient: Arielle GUAJARDO Isidro  Location: [place] [address]  MRN: [id number]  Today's date: 3/7/2024    HISTORY OF PRESENT ILLNESS     Arielle is a 63 y.o. female admitted on 3/3/2024 with Pericardial effusion [I31.39]  Interstitial lung disease (CMS/HCC) [J84.9]  Hypoxia [R09.02]. Principal problem is Pericardial effusion.    Past Medical History  Arielle has a past medical history of De Quervain's tenosynovitis, Essential (primary) hypertension, Follicular carcinoma of thyroid (CMS/HCC), Gastro-esophageal reflux, Hand ulceration (CMS/HCC), Hyperlipidemia, unspecified, Interstitial lung disease (CMS/HCC), Leg ulcer (CMS/HCC), Lung nodule, Lupus (CMS/HCC), Osteomyelitis of hand (CMS/HCC), Osteoporosis, Pulmonary hypertension (CMS/HCC), Raynaud's disease, Reflux esophagitis, Scleroderma (CMS/HCC), and Screening mammogram for breast cancer (05/04/2021).    History of Present Illness  Patient presented with SOB and CP, found to have pericardial effusion on imaging. Of note, patient has chronic b/l foot wounds and digital amputations. S/p TTE 3/4.     XR foot 3/4: negative for OM      PRIOR LEVEL OF FUNCTION AND LIVING ENVIRONMENT     Prior Level of Function    Flowsheet Row Most Recent Value   Dominant Hand right   Ambulation independent   Transferring independent   Toileting independent   Bathing assistive equipment and person   Dressing assistive person   Eating independent   IADLs assistive person   Driving/Transportation friends/family   Prior Level of Function Comment Caregivers through an agency 24/7 days a week, assist with all BADLs and IADLs   Assistive Device Currently Used at Home stair glide, shower chair        Prior Living Environment    Flowsheet Row Most Recent Value   People in Home alone   Current Living Arrangements home   Home Accessibility stairs within home (Group), stairs to enter home (Group)   Living Environment  Comment 2SH with 4 LARRY + rail, stair glide to bed/bath (tub shower). No [place].        VITALS AND PAIN        Objective   OBJECTIVE     Start time:  1506  End time:  1517  Session Length: 11 min  Mode of Treatment: physical therapy    General Observations  Patient received supine. She was agreeable to therapy, no issues or concerns identified by nurse prior to session. at first agreeable to therapy then declining OOB due to B foot pain    Precautions: fall       Limitations/Impairments: sensory      PT Eval and Treat - 03/07/24 1616        Cognition    Orientation Status oriented x 4     Affect/Mental Status anxious        Bed Mobility    Comment declined OOB due to foot pain . therapist brought platform walker into room since unable to  regular walker at first agreeable then stated that she could not and would not try today but stated she liked the walker        Lower Extremity (Therapeutic Exercise)    Exercise Position/Type supine     General Exercise bilateral;ankle pumps;knee flexion;quad sets;SLR (straight leg raise);hip aBduction;hip aDduction;heel slides     Reps and Sets x5     Comment reports she will continue to do in bed        Impairments/Safety Issues    Impairments Affecting Function balance;coordination;endurance/activity tolerance;pain;sensation/sensory awareness     Functional Endurance limited by feet                                   Session Outcome  Patient reclined, in chair at end of session, bed alarm on.      AM-PAC™ - Mobility (Current Function)     Turning form your back to your side while in flat bed without using bedrails 3 - A Little   Moving from lying on your back to sitting on the side of a flat bed without using bedrails 3 - A Little   Moving to and from a bed to a chair 2 - A Lot   Standing up from a chair using your arms 2 - A Lot   To walk in a hospital room 2 - A Lot   Climbing 3-5 steps with a railing 2 - A Lot   AM-PAC™ Mobility Score 14      ASSESSMENT AND PLAN     Progress  Summary  pt declining EOB or OOB activity today stating I just cant. therapist suggested platform walker to de weight feet which pt like idea of but still declined OOb. ther ex only. rec home with 24 hr assist    Patient/Family Therapy Goals Statement: To move better    PT Plan    Flowsheet Row Most Recent Value   Rehab Potential good, to achieve stated therapy goals at 03/07/2024 1616   Therapy Frequency 4 times/wk at 03/07/2024 1616   Planned Therapy Interventions balance training, bed mobility training, gait training, home exercise program, strengthening, stretching, transfer training at 03/07/2024 1616          PT Discharge Recommendations    Flowsheet Row Most Recent Value   PT Recommended Discharge Disposition home with home health, home with assistance at 03/07/2024 1616   Anticipated Equipment Needs if Discharged Home (PT) other (see comments)  [platform walker] at 03/07/2024 1616               PT Goals    Flowsheet Row Most Recent Value   Bed Mobility Goal 1    Activity/Assistive Device bed mobility activities, all at 03/06/2024 1000   [place] supervision required at 03/06/2024 1000   Time Frame by discharge at 03/06/2024 1000   Progress/Outcome goal ongoing at 03/06/2024 1000   Transfer Goal 1    Activity/Assistive Device sit-to-stand/stand-to-sit, bed-to-chair/chair-to-bed at 03/06/2024 1000   [place] supervision required at 03/06/2024 1000   Time Frame by discharge at 03/06/2024 1000   Progress/Outcome goal ongoing at 03/06/2024 1000   Gait Training Goal 1    Activity/Assistive Device gait (walking locomotion), decrease asymmetrical patterns, decrease fall risk, diminish gait deviation, improve balance and speed, increase endurance/gait distance, increase energy conservation at 03/06/2024 1000   Mercer supervision required at 03/06/2024 1000   Distance 100 at 03/06/2024 1000   Time Frame by discharge at 03/06/2024 1000   Progress/Outcome goal ongoing at 03/06/2024 1000

## 2024-03-07 NOTE — PLAN OF CARE
Care Coordination Discharge Plan Note     Discharge Needs Assessment  Concerns to be Addressed: discharge planning  Current Discharge Risk: chronically ill, physical impairment    Anticipated Discharge Plan  Anticipated Discharge Disposition: home with assistance, home with home health  Type of Home Care Services: nursing, home PT, home OT        Patient Choice  Offered/Gave Vendor List: yes  Patient's Choice of Community Agency(s): [facility]    Patient and/or patient guardian/advocate was made aware of their right to choose a provider. A list of eligible providers was presented and reviewed with the patient and/or patient guardian/advocate in written and/or verbal form. The list delineates providers in the patient’s desired geographic area who are participating in the Medicare program and/or providers contracted with the patient’s primary insurance. The Medicare list and quality ratings were obtained from the Medicare.gov [medicare.gov] website.    ---------------------------------------------------------------------------------------------------------------------    Interdisciplinary Discharge Plan Review:  Participants:nursing, physical therapy, , occupational therapy, physician, dietitian/nutrition services, patient, social work/services    Concerns Comments: Per DPR, the patient is not medically stable for discharge. Patient still requiring IV diuresis, will need to transition po. Brijesh , patient is agreeable to [facility]. Referral placed.  ENRIQUE Friday vs Saturday. CC will continue to follow for transition of care needs until discharge is complete.    Discharge Plan:   Disposition/Destination: Home Health Care - Other / Home  Discharge Facility:    Community Resources:      Discharge Transportation:  Is Out of Hospital DNR needed at Discharge: no  Does patient need discharge transport? No

## 2024-03-07 NOTE — PLAN OF CARE
Plan of Care Review  Plan of Care Reviewed With: patient  Progress: improving  Outcome Evaluation: Pt AAOx4, VSS t/o shift, assist x1 in bed. C/o generalized pain, PRN tylenol administered and heating pad applied as ordered. Pt refuses stronger pain meds. FSP maintained, bed alarm on, call bell and all other needs are within reach.

## 2024-03-08 LAB
ALBUMIN SERPL-MCNC: 3.2 G/DL (ref 3.5–5.7)
ALP SERPL-CCNC: 52 IU/L (ref 34–125)
ALT SERPL-CCNC: 10 IU/L (ref 7–52)
ANION GAP SERPL CALC-SCNC: 7 MEQ/L (ref 3–15)
AST SERPL-CCNC: 15 IU/L (ref 13–39)
BACTERIA BLD CULT: NORMAL
BACTERIA BLD CULT: NORMAL
BILIRUB SERPL-MCNC: 0.2 MG/DL (ref 0.3–1.2)
BUN SERPL-MCNC: 30 MG/DL (ref 7–25)
CALCIUM SERPL-MCNC: 9 MG/DL (ref 8.6–10.3)
CHLORIDE SERPL-SCNC: 108 MEQ/L (ref 98–107)
CHOLEST SERPL-MCNC: 148 MG/DL
CO2 SERPL-SCNC: 23 MEQ/L (ref 21–31)
CREAT SERPL-MCNC: 0.9 MG/DL (ref 0.6–1.2)
EGFRCR SERPLBLD CKD-EPI 2021: >60 ML/MIN/1.73M*2
ERYTHROCYTE [DISTWIDTH] IN BLOOD BY AUTOMATED COUNT: 19 % (ref 11.7–14.4)
GLUCOSE BLD-MCNC: 106 MG/DL (ref 70–99)
GLUCOSE BLD-MCNC: 147 MG/DL (ref 70–99)
GLUCOSE SERPL-MCNC: 90 MG/DL (ref 70–99)
HCT VFR BLD AUTO: 32.2 % (ref 35–45)
HDLC SERPL-MCNC: 49 MG/DL
HDLC SERPL: 3 {RATIO}
HGB BLD-MCNC: 9.9 G/DL (ref 11.8–15.7)
LDLC SERPL CALC-MCNC: 85 MG/DL
MCH RBC QN AUTO: 26.3 PG (ref 28–33.2)
MCHC RBC AUTO-ENTMCNC: 30.7 G/DL (ref 32.2–35.5)
MCV RBC AUTO: 85.6 FL (ref 83–98)
NONHDLC SERPL-MCNC: 99 MG/DL
PDW BLD AUTO: 10.7 FL (ref 9.4–12.3)
PLATELET # BLD AUTO: 314 K/UL (ref 150–369)
POCT TEST: ABNORMAL
POCT TEST: ABNORMAL
POTASSIUM SERPL-SCNC: 4.1 MEQ/L (ref 3.5–5.1)
PROT SERPL-MCNC: 7.7 G/DL (ref 6–8.2)
RBC # BLD AUTO: 3.76 M/UL (ref 3.93–5.22)
SODIUM SERPL-SCNC: 138 MEQ/L (ref 136–145)
T4 FREE SERPL-MCNC: 1.07 NG/DL (ref 0.58–1.64)
T4 FREE SERPL-MCNC: 1.23 NG/DL (ref 0.58–1.64)
TRIGL SERPL-MCNC: 70 MG/DL
TSH SERPL DL<=0.05 MIU/L-ACNC: 0.28 MIU/L (ref 0.34–5.6)
WBC # BLD AUTO: 11.52 K/UL (ref 3.8–10.5)

## 2024-03-08 PROCEDURE — 63700000 HC SELF-ADMINISTRABLE DRUG: Performed by: STUDENT IN AN ORGANIZED HEALTH CARE EDUCATION/TRAINING PROGRAM

## 2024-03-08 PROCEDURE — 36415 COLL VENOUS BLD VENIPUNCTURE: CPT | Performed by: STUDENT IN AN ORGANIZED HEALTH CARE EDUCATION/TRAINING PROGRAM

## 2024-03-08 PROCEDURE — 97535 SELF CARE MNGMENT TRAINING: CPT | Mod: GO

## 2024-03-08 PROCEDURE — 80053 COMPREHEN METABOLIC PANEL: CPT

## 2024-03-08 PROCEDURE — 97530 THERAPEUTIC ACTIVITIES: CPT | Mod: GP,CQ

## 2024-03-08 PROCEDURE — 85027 COMPLETE CBC AUTOMATED: CPT

## 2024-03-08 PROCEDURE — 63700000 HC SELF-ADMINISTRABLE DRUG

## 2024-03-08 PROCEDURE — 21400000 HC ROOM AND CARE CCU/INTERMEDIATE

## 2024-03-08 PROCEDURE — 99233 SBSQ HOSP IP/OBS HIGH 50: CPT | Performed by: HOSPITALIST

## 2024-03-08 PROCEDURE — 99232 SBSQ HOSP IP/OBS MODERATE 35: CPT | Performed by: INTERNAL MEDICINE

## 2024-03-08 PROCEDURE — 84439 ASSAY OF FREE THYROXINE: CPT | Performed by: STUDENT IN AN ORGANIZED HEALTH CARE EDUCATION/TRAINING PROGRAM

## 2024-03-08 RX ORDER — COLCHICINE 0.6 MG/1
0.6 TABLET ORAL 2 TIMES DAILY
Qty: 60 TABLET | Refills: 0 | Status: CANCELLED | OUTPATIENT
Start: 2024-03-08 | End: 2024-04-07

## 2024-03-08 RX ORDER — COLCHICINE 0.6 MG/1
0.6 TABLET ORAL DAILY
Status: DISCONTINUED | OUTPATIENT
Start: 2024-03-09 | End: 2024-03-09

## 2024-03-08 RX ORDER — LOPERAMIDE HYDROCHLORIDE 2 MG/1
2 CAPSULE ORAL ONCE
Status: COMPLETED | OUTPATIENT
Start: 2024-03-08 | End: 2024-03-08

## 2024-03-08 RX ORDER — ATORVASTATIN CALCIUM 20 MG/1
20 TABLET, FILM COATED ORAL
Status: DISCONTINUED | OUTPATIENT
Start: 2024-03-08 | End: 2024-03-16 | Stop reason: HOSPADM

## 2024-03-08 RX ORDER — COLCHICINE 0.6 MG/1
0.6 TABLET ORAL DAILY
Qty: 30 TABLET | Refills: 0 | Status: CANCELLED | OUTPATIENT
Start: 2024-03-09 | End: 2024-04-08

## 2024-03-08 RX ORDER — LOPERAMIDE HYDROCHLORIDE 2 MG/1
2 CAPSULE ORAL 3 TIMES DAILY PRN
Status: DISCONTINUED | OUTPATIENT
Start: 2024-03-08 | End: 2024-03-16 | Stop reason: HOSPADM

## 2024-03-08 RX ORDER — CLOPIDOGREL BISULFATE 75 MG/1
75 TABLET ORAL DAILY
Status: DISCONTINUED | OUTPATIENT
Start: 2024-03-08 | End: 2024-03-16 | Stop reason: HOSPADM

## 2024-03-08 RX ADMIN — SILDENAFIL 20 MG: 20 TABLET, FILM COATED ORAL at 13:36

## 2024-03-08 RX ADMIN — SILDENAFIL 20 MG: 20 TABLET, FILM COATED ORAL at 20:52

## 2024-03-08 RX ADMIN — LIDOCAINE 1 PATCH: 4 PATCH TOPICAL at 08:36

## 2024-03-08 RX ADMIN — LOPERAMIDE HYDROCHLORIDE 2 MG: 2 CAPSULE ORAL at 20:51

## 2024-03-08 RX ADMIN — COLCHICINE 0.6 MG: 0.6 TABLET ORAL at 08:36

## 2024-03-08 RX ADMIN — MOMETASONE FUROATE AND FORMOTEROL FUMARATE DIHYDRATE 2 PUFF: 100; 5 AEROSOL RESPIRATORY (INHALATION) at 08:36

## 2024-03-08 RX ADMIN — Medication 100 MCG: at 08:36

## 2024-03-08 RX ADMIN — TIOTROPIUM BROMIDE INHALATION SPRAY 2 PUFF: 3.12 SPRAY, METERED RESPIRATORY (INHALATION) at 08:36

## 2024-03-08 RX ADMIN — ACETAMINOPHEN 650 MG: 325 TABLET ORAL at 22:44

## 2024-03-08 RX ADMIN — MUPIROCIN 1 APPLICATION: 20 OINTMENT TOPICAL at 08:35

## 2024-03-08 RX ADMIN — SILDENAFIL 20 MG: 20 TABLET, FILM COATED ORAL at 08:36

## 2024-03-08 RX ADMIN — LEVOTHYROXINE SODIUM 100 MCG: 0.1 TABLET ORAL at 05:31

## 2024-03-08 RX ADMIN — AMLODIPINE BESYLATE 10 MG: 10 TABLET ORAL at 08:36

## 2024-03-08 RX ADMIN — MOMETASONE FUROATE AND FORMOTEROL FUMARATE DIHYDRATE 2 PUFF: 100; 5 AEROSOL RESPIRATORY (INHALATION) at 20:52

## 2024-03-08 RX ADMIN — PREDNISONE 20 MG: 20 TABLET ORAL at 08:36

## 2024-03-08 RX ADMIN — LOPERAMIDE HYDROCHLORIDE 2 MG: 2 CAPSULE ORAL at 00:44

## 2024-03-08 RX ADMIN — Medication 1000 UNITS: at 08:36

## 2024-03-08 ASSESSMENT — COGNITIVE AND FUNCTIONAL STATUS - GENERAL
HELP NEEDED FOR PERSONAL GROOMING: 3 - A LITTLE
WALKING IN HOSPITAL ROOM: 1 - TOTAL
STANDING UP FROM CHAIR USING ARMS: 2 - A LOT
STANDING UP FROM CHAIR USING ARMS: 2 - A LOT
AFFECT: WFL
EATING MEALS: 3 - A LITTLE
MOVING TO AND FROM BED TO CHAIR: 2 - A LOT
TOILETING: 1 - TOTAL
DRESSING REGULAR LOWER BODY CLOTHING: 1 - TOTAL
AFFECT: WFL
MOVING TO AND FROM BED TO CHAIR: 2 - A LOT
DRESSING REGULAR UPPER BODY CLOTHING: 3 - A LITTLE
HELP NEEDED FOR BATHING: 2 - A LOT
CLIMB 3 TO 5 STEPS WITH RAILING: 2 - A LOT
CLIMB 3 TO 5 STEPS WITH RAILING: 1 - TOTAL
WALKING IN HOSPITAL ROOM: 2 - A LOT

## 2024-03-08 NOTE — PROGRESS NOTES
Heart Failure / Pulmonary Hypertension Progress Note    Interval History: Seen and examined sitting up in bed. She reports loose stools overnight. She reports on-going chest, head, stomach and b/l foot pain. She states that left shoulder pain may be a bit better. At rest, she denies shortness of breath or light-headedness.     Consult Background:   Ms. Felix is a 63 y.o. female with a past medical history of Pulmonary HTN, HTN, Raynaud's Disease, Cutaneous Systemic Scleroderma (dx 1998), ILD, PE (3/2023). She is a patient of Dr. Nguyễn. She was previously followed by Dr. Jos Valdez at [facility]. Echocardiogram from 4/21/2022 showed LVEF: 55-60%, mild aortic valve thickening, pulmonary artery systolic pressure: 52 mmHg and trivial pericardial effusion. LHC and RHC (separate occasions) over the last 5-10 years which showed normal hemodynamics and normal coronaries. She had a RHC 9/02/2020 showing top-normal right sided filling pressures with mild pulmonary hypertension, top-normal PVR and normal pulmonary capillary wedge pressure. The cardiac index was normal, seen below.     [place] 6[place]27[place]2022, [place] [facility] [place]. EKG: NSR without ischemic changes. hsTrop: 12->16, d-dimer: 0.56, CXR showed cardiomegaly and diffuse interstitial prominence.     Dr. Nguyễn ordered echocardiogram, which was completed on 8/22/2022 and showed estimated RVSP = 50-55 mmHg, normal-sized LV with normal LV systolic function. Estimated EF 55- 60%. Wall motion grossly normal, mild concentric left ventricular hypertrophy, grade I LV diastolic dysfunction, probably normal RV size and function.    At her initial visit on 10/11/2022, she reported that she felt very short of breath with minimal activity most of the time. She reports that this started about 1 year prior and had progressively gotten worse. She described PND and bendopnea. She reported that she experienced ankle swelling, worsened over the past year and usually worse  towards the end of the night. She also reported some swelling in her abdomen. She reported daily palpations. I recommended a RHC which was done 11/28/2022 and showed: Normal right sided filling pressures. Mildly elevated left sided filling pressures. Precapillary pulmonary hypertension with mean PA 36 mmHg.    She was hospitalized on 3/19/2023 at [facility] for PE diagnosed on V/Q scan. She reports that she had presented with left arm pain and heaviness. She states that she was started on Apixaban. One week after her last office visit (4/6/2023), she presented to [facility] [place] with chest pain, epistaxis and hemoptysis. Echocardiogram showed: left ventricular cavity size normal with mild left ventricular hypertrophy and preserved systolic function. Estimated EF 65%. Chest CTA showed no evidence of PE; Apixaban was discontinued.     She had a repeat echocardiogram (6/2023) which showed: Normal left ventricular cavity size and mild concentric left ventricular hypertrophy. Normal systolic function. EF: 60%. Normal right ventricular structure and function. Mild tricuspid valve regurgitation with estimated RVSP: 55 mmHg. Compared to previous study from 4/14/2023, estimated right ventricular systolic pressure were higher.    At her last office visit on 7/25/2023, she reported that she was not sure if she received/started the Macitentan. She stated at the time we initiated it, her Pulmonologist also started her on a new medication (Mycophenolate), and she had significant GI intolerance with this. It was trialed at various doses but ultimately stopped. At that visit, I asked her to check if she had Macitentan and if she did to start it and monitor her SpO2.    She underwent V/Q scan in September 2023, which showed intermediate-to-high probability of pulmonary embolic disease, as a result she completed CTA Chest PE which showed: no evidence for filling defect or vessel cutoff to suggest pulmonary embolism. Enlargement of  "the pulmonary arteries compatible with pulmonary arterial hypertension was seen.    She started Macitentan around August or September 2023.    She was last seen in the office in February 2024.     She presented to [facility] [place] [place] on 3/3 with chest pressure, palpitations, nausea. She was noted to have hypoxia and tachypnea at presentation. Given moderate to large pericardial effusion, she was monitored in CCU and transferred out on 3/6. hsTroponin was not elevated. She remained hemodynamically stable while in CCU. She has been started on Colchicine and steroids without relief of multi-system pain. She is planned for CCTA this AM. Hypoxia and tachypnea have resolved.     ---    Rheumatology: Enzo ([facility])     Past Medical / Surgical History:  Pulmonary HTN, HTN, Raynaud's Disease, Cutaneous Systemic Scleroderma (dx 1998), ILD, PE (3/2023), Follicular Carcinoma of Thyroid, Tenosynovitis, de Quervain, h/o Hernia Repair, h/o Appendicitis, h/o Digit Amputation, H/o Total Thyroidectomy (Dr. Pacheco at [facility]; 4/2013)    Family & Social History: She is , she has two children. She works as an .     Tobacco: former 2005  EtOH: never   Illicits: never     Her brother has CAD with stent  1st cousin with SLE  Both parents had \"heart problems\"    Allergies:   Allergies   Allergen Reactions   • Aspirin Shortness of breath     Other reaction(s): Unknown  \"stop breathing\"     • Ibuprofen Shortness of breath     Stop breathing   • Iodine Shortness of breath     Other reaction(s): Unknown   • Iodine And Iodide Containing Products Shortness of breath     ? rash   • Penicillins Shortness of breath     Other reaction(s): Unknown   • Sulfa (Sulfonamide Antibiotics) Angioedema   • Clindamycin    • Hydrochlorothiazide      Other reaction(s): Abdominal Pain   Slow heart rate   • Oxycodone      Other reaction(s): Vomiting   • Sulfamethoxazole-Trimethoprim      Other reaction(s): Vomiting, Weakness    • Latex Rash       Medications:   Current " Facility-Administered Medications   Medication Dose Route Frequency Provider Last Rate Last Admin   • acetaminophen (TYLENOL) tablet 650 mg  650 mg oral q6h PRN Patricia Haley DO   650 mg at 03/06/24 2228   • amLODIPine (NORVASC) tablet 10 mg  10 mg oral q AM Patricia Haley DO   10 mg at 03/08/24 0836   • [Provider Managed Hold] amLODIPine (NORVASC) tablet 5-10 mg  5-10 mg oral Nightly PRN Dee Arellano DO       • cholecalciferol (vitamin D3) tablet 1,000 Units  1,000 Units oral Daily Dee Arellano DO   1,000 Units at 03/08/24 0836   • [START ON 3/9/2024] colchicine (COLCRYS) tablet 0.6 mg  0.6 mg oral Daily Wolf Bernard MD       • cyanocobalamin (VITAMIN B12) tablet 100 mcg  100 mcg oral Daily Dee Arellano DO   100 mcg at 03/08/24 0836   • glucose chewable tablet 16-32 g of dextrose  16-32 g of dextrose oral PRN Dee Arellano DO        Or   • dextrose 40 % oral gel 15-30 g of dextrose  15-30 g of dextrose oral PRN Dee Arellano DO        Or   • glucagon (GLUCAGEN) injection 1 mg  1 mg intramuscular PRN Dee Arellano DO        Or   • dextrose 50 % in water (D50) injection 12.5 g  25 mL intravenous PRN Dee Arellano DO       • insulin lispro U-100 (HumaLOG) pen 3-5 Units  3-5 Units subcutaneous With meals & nightly Wolf Bernard MD       • levothyroxine (SYNTHROID) tablet 100 mcg  100 mcg oral Daily Dee Arellano DO   100 mcg at 03/08/24 0531   • lidocaine (ASPERCREME) 4 % topical patch 1 patch  1 patch Topical Daily Sherita Panchal MD   1 patch at 03/08/24 0836   • loperamide (IMODIUM) capsule 2 mg  2 mg oral 3x daily PRN Wolf Bernard MD       • meclizine (ANTIVERT) chewable tablet 25 mg  25 mg oral 3x daily PRN Wagner Chen MD   25 mg at 03/05/24 1817   • metoprolol (LOPRESSOR) injection 5 mg  5 mg intravenous PRN Timothy Arvizu,     5 mg at 03/07/24 1215   • metoprolol tartrate (LOPRESSOR) tablet 50 mg  50 mg oral q30 min PRN Timothy Arvizu DO   50 mg at 03/07/24 1000   • mometasone-formoterol (DULERA 100) 100-5 mcg/actuation inhaler 2 puff  2 puff inhalation BID (8a, 8p) Dee Arellano, DO   2 puff at 03/08/24 0836    And   • tiotropium bromide (SPIRIVA RESPIMAT) 2.5 mcg/actuation inhaler 2 puff  2 puff inhalation Daily (8a) Dee Arellano, DO   2 puff at 03/08/24 0836   • mupirocin (BACTROBAN) 2 % ointment 1 Application  1 Application Topical Daily Dee Arellano DO   1 Application at 03/08/24 0835   • ondansetron ODT (ZOFRAN-ODT) disintegrating tablet 4 mg  4 mg oral q8h PRN Dee Arellano DO        Or   • ondansetron (ZOFRAN) injection 4 mg  4 mg intravenous q8h PRN Dee Arellano DO   4 mg at 03/06/24 1235   • predniSONE (DELTASONE) tablet 20 mg  20 mg oral Daily Patricia Haley,    20 mg at 03/08/24 0836   • Pt Own- Macitentan (OPSUMIT) tablet 10mg  10 mg oral Daily Dee Arellano, DO   10 mg at 03/08/24 0836   • sildenafiL (pulm.hypertension) (REVATIO) tablet 20 mg  20 mg oral TID Octavio Giordano MD   20 mg at 03/08/24 0836       Review of Systems:   All other systems reviewed and are negative except as per HPI.    Physical Exam:     Wt Readings from Last 10 Encounters:   03/08/24 45.4 kg (100 lb 1.6 oz)   02/01/24 47.6 kg (105 lb)   07/25/23 50.8 kg (112 lb)   07/25/23 50.8 kg (112 lb)   06/21/23 51.3 kg (113 lb)   04/19/23 51.7 kg (113 lb 14.4 oz)   04/06/23 52.6 kg (116 lb)   01/18/23 54.4 kg (120 lb)   11/28/22 55.3 kg (122 lb)   10/07/22 56.2 kg (124 lb)       BP Readings from Last 5 Encounters:   03/08/24 (!) 150/69   02/01/24 120/70   07/25/23 130/80   07/25/23 134/80   06/21/23 132/85       Vitals:    03/08/24 0830   BP:    Pulse: (!) 56   Resp:    Temp:    SpO2:        Body mass index is 16.16 kg/m².  Body surface area is 1.45 meters  squared.    Constitutional: NAD, AAOx3  HENT: Normocephalic, atraumatic head, sclerae anicteric, no cervical lymphadenopathy, trachea midline  Cardiovascular: RRR, no murmurs, rubs or gallops, JVP: 7-8 cm H2O   cm H2O, no carotid bruits.  Respiratory: CTA bilateral lung fields, no wheezes, rales or rhonchi  GI: soft, non-tender/non-distended  Musculoskeletal / Extremities: no peripheral edema, +2 pulses bilateral radial, DP/PT arteries, skin tightening on face and fingertips  Skin: no rashes  Neuro / Psych: no focal deficits, CNII-XII grossly intact, appropriate, cooperative    Diagnostic Data:     Results from last 7 days   Lab Units 03/07/24  0525 03/06/24  0616 03/05/24  0403   SODIUM mEQ/L 137 138 137   POTASSIUM mEQ/L 5.0 4.5 4.4   CHLORIDE mEQ/L 107 107 107   CO2 mEQ/L 20* 24 23   BUN mg/dL 25 24 27*   CREATININE mg/dL 0.9 0.9 0.9   CALCIUM mg/dL 8.9 8.8 8.5*   ALBUMIN g/dL 3.3* 3.2* 3.1*   BILIRUBIN TOTAL mg/dL 0.2* 0.3 0.3   ALK PHOS IU/L 55 52 52   ALT IU/L 7 7 5*   AST IU/L 13 15 14   GLUCOSE mg/dL 105* 73 76     Results from last 7 days   Lab Units 03/07/24  0525 03/06/24  0616 03/05/24  0403   WBC K/uL 8.14 8.38 7.58   HEMOGLOBIN g/dL 9.0* 8.8* 8.4*   HEMATOCRIT % 29.9* 29.1* 28.2*   MCV fL 86.7 85.8 86.8   PLATELETS K/uL 277 247 225     Component      Latest Ref Rng 4/13/2023 2/27/2024 3/3/2024   BNP      <=100 pg/mL 105 (H)  82  119 (H)       Component      Latest Ref Rng 3/3/2024   High Sens Troponin I      <15.0 pg/mL 12.6    High Sens Troponin I       12.2      Component      Latest Ref Rng 3/4/2024   CRP      <7.00 mg/L 8.90 (H)       Component      Latest Ref Rng 3/4/2024   Sed Rate      0 - 30 mm/hr 119 (H)       Component      Latest Ref Rng 3/3/2024   D-Dimer, Quant      0.00 - 0.50 ug/mL FEU 0.68 (H)       Component      Latest Ref Rng 4/18/2023 9/27/2023   Sodium      134 - 144 mmol/L 139  140    Potassium, Bld      3.5 - 5.2 mmol/L 4.4  4.1    Chloride      96 - 106 mmol/L 108  107 (H)     CO2      20 - 29 mmol/L 23  20    BUN      8 - 27 mg/dL 26 (H)  18    Creatinine      0.57 - 1.00 mg/dL 1.1  0.96    Glucose      70 - 99 mg/dL 82  83    Calcium      8.7 - 10.3 mg/dL 9.1  9.5    eGFR      >59 mL/min/1.73 >60.0  67      Component      Latest [facility] 4/13/2023   Hemoglobin A1C      <5.7 % 4.4      Component      Latest [facility] 4/13/2023 4/18/2023   WBC      3.80 - 10.50 K/uL 7.12  6.95    Hemoglobin      11.8 - 15.7 g/dL 10.6 (L)  10.1 (L)    Hematocrit      35.0 - 45.0 % 34.3 (L)  33.0 (L)    MCV      83.0 - 98.0 fL 87.7  90.4    Platelets      150 - 369 K/uL 267  256        Component      Latest [facility] 4/14/2023   Triglycerides      30 - 149 mg/dL 44    Cholesterol      <=200 mg/dL 194    HDL      >=55 mg/dL 49 (L)    LDL Calculated      <=100 mg/dL 136 (H)    Non-HDL, Calculated      mg/dL 145    RISK      <=5.0  4.0       Component      Latest [facility] 4/13/2023   High Sens Troponin I      <15.0 pg/mL 23.0 (H)       Component      Latest [facility] 4/14/2023   Ferritin      11 - 250 ng/mL 75      Component      Latest [facility] 4/13/2023   TSH      0.34 - 5.60 mIU/L 4.71      Component      Latest [facility] 4/14/2023   Iron      35 - 150 ug/dL 29 (L)    TIBC      270 - 460 ug/dL 245 (L)    UIBC      180 - 360 ug/dL 216    Iron Saturation      15 - 45 % 12 (L)        Component      Latest [facility] 6/27/2022 4/13/2023   BNP      <=100 pg/mL 111 (H)  105 (H)                                     ---    ECG (3/3/2024, personally reviewed):   Sinus tachycardia   Nonspecific ST and T wave abnormality   HR: 129 bpm     ---    TTE (3/4/2024, personally reviewed):   • Normal-sized left ventricle. Mild left ventricular hypertrophy. Preserved systolic function. LVEF 60%. No regional wall motion abnormalities. Grade II diastolic dysfunction.  Normal global longitudinal strain (-20%).  • The aortic valve is not well seen.  Cannot determine the number of cusps.  Sclerotic leaflets.  No aortic stenosis.  Trace aortic  insufficiency.  • Normal sized aortic root.  The visualized portion of the ascending aorta is normal in size.  • The mitral valve leaflets are thickened. Mild mitral annular calcification. Trace mitral regurgitation.   • Mildly dilated left atrium.  • Normal-sized right ventricle. Normal right ventricular systolic function.  • Thickened tricuspid valve. There is mild tricuspid regurgitation with estimated RVSP of 46 mmHg.   • Pulmonic valve is not well visualized. Trace pulmonic regurgitation.   • Mildly dilated right atrium.  • IVC is enlarged with <50% respiratory variation consistent with elevated right atrial filling pressure (at least 15 mmHg).   • Moderate, circumferential, circumferential pericardial effusion.  The largest pocket is located inferolaterally.  Elevated right atrial pressure.  No other clear echocardiographic features of increased pericardial pressure.  Correlate clinically.   • Compared to previous TTE from 6/21/2023, pericardial effusion now moderate in size.  Right atrial pressure now elevated.       CTA Chest (3/3/2024):   IMPRESSION:  1.  No evidence of acute pulmonary embolism to the level of the segmental  branches.  2.  Moderate to large pericardial effusion measuring higher than simple fluid  density.  3.  Lower lobe lung field predominant interstitial thickening with some relative  subpleural sparing, consistent with a chronic interstitial lung disease,  unchanged from the prior study.  4.  Continued bilateral axillary, mediastinal, and bilateral hilar  lymphadenopathy, not definitely changed from the prior study, possibly related  to the patient's sarcoid.  5.  Emphysema.     CTA Chest PE (10/4/2023):  IMPRESSION:  1. No evidence for filling defect or vessel cutoff to suggest pulmonary  embolism.  Enlargement of the pulmonary arteries compatible with pulmonary  arterial hypertension.  2. Changes throughout the lungs as described above which can be seen with  systemic  sclerosis/scleroderma.  Underlying pneumonia the lung bases cannot be  entirely excluded in the proper clinical setting.  3.  Additional stable findings as described above.        VQ (9/12/2023):  IMPRESSION:  Intermediate to high probability of pulmonary embolic disease.     6MWT (7/25/2023, on Sildenafil 20 mg TID):        TTE (6/21/2023, personally reviewed):  1. Normal left ventricular cavity size and mild concentric left ventricular hypertrophy. Normal systolic function. EF: 60%. No regional wall motion abnormalities. Grade I diastolic dysfunction.   2. Aortic valve cusps not well visualized. Trace aortic regurgitation. Aortic valve and root sclerosis.  3. Thickened mitral valve leaflets. Mild mitral annular calcification. Trace mitral regurgitation. Mildly dilated left atrial size.   4. Normal right ventricular structure and function. Mild tricuspid valve regurgitation with estimated RVSP: 55 mmHg (assuming right atrial pressure of 3 mmHg). Normal right atrial size. Pulmonic valve not well visualized. Trace pulmonic valve regurgitation. Pulsed wave Doppler of the RVOT systolic profile show decreased acceleration times ( msec) and late systolic notching consistent with elevated PVR.   5. IVC size is normal with > 50% inspiratory collapse consistent with normal right atrial pressure. Small pericardial effusion without echocardiographic evidence of tamponade.  6. Compared to previous study from 4/14/2023, estimated right ventricular systolic pressure is higher.        TTE (4/14/2023, personally reviewed):   • The left ventricular cavity size is normal with mild left ventricular hypertrophy and preserved systolic function. Estimated EF 65%. No regional wall motion abnormalities. Grade I diastolic dysfunction.     • The aortic valve is tricuspid. Aortic valve sclerosis. Trace aortic regurgitation.      • The visualized portions of the aortic root and ascending aorta are normal in size.     • The mitral valve  is mildly thickened. Mild mitral annular calcification. Trace mitral regurgitation.       • The left atrium is severely dilated.      • The right ventricle is normal in size with preserved systolic function     • The pulmonic valve is not well seen.     • The tricuspid valve is normal in appearance. mild tricuspid regurgitation with an estimated RVSP of 34 mmHg.       • Right atrium is normal in size.     • The IVC is small and collapses > 50% with inspiration consistent with normal right atrial pressures.     • Small pericardial effusion, mostly posterior.       • Compared to previous echocardiogram from 8/22/2022, there is no significant change        TTE (11/28/2022, personally reviewed):   • Normal right- and mildly elevated left-sided filling pressures (RA: 3 mmHg, PCWP: 13 mmHg)  • Elevated pulmonary artery pressures (60/22(35 mmHg)) in the setting of PCWP: 13 mmHg.   • Normal cardiac output and index (CO: 5.1 L/min, CI: 3.2 L/min/m2)  • PVR: 4.3 Wood units. SVR: 1840 dynes/sec/cm5      TTE (8/22/2022, personally reviewed):  · Normal-sized LV. Normal LV systolic function. Estimated EF 55- 60%. Wall motion appears grossly normal. Mild concentric left ventricular hypertrophy. Grade I LV diastolic dysfunction.  · Pulmonic valve not well visualized. Grossly normal pulmonic valve structure. Trace pulmonic valve regurgitation. No pulmonic valve stenosis.  · Aortic valve not well visualized. Aortic valve not well seen but likely trileaflet. Focal aortic valve calcification present. Sclerotic aortic valve leaflets. Mild aortic valve regurgitation. No aortic valve stenosis.  · RV not well visualized. Normal-sized RV. Normal RV systolic function.  · Normal-sized RA.  · There is a small loculated pericardial effusion anterior to the right atrium. No cardiac tamponade.  · Sclerotic mitral valve. Mitral valve calcification of the anterior leaflet present.  · Trace mitral valve regurgitation.  · No significant mitral valve  stenosis.  · Normal-sized IVC. IVC demonstrates normal respiratory collapse.  · Tricuspid valve structure is grossly normal. Mild to moderate tricuspid valve regurgitation.  · Estimated RVSP = 50-55 mmHg.  · Mildly dilated LA.  · No previous echocardiogram available for comparison.     TTE (4/21/2022, outside study):  This result has an attachment that is not available.   •  A complete transthoracic echocardiogram (including 2D, color flow   Doppler, spectral Doppler and M-mode imaging) was performed using the   standard protocol. The study quality was adequate.   •  The left ventricle is normal in size. There is moderately increased   wall thickness consistent with moderate concentric hypertrophy.   Endocardium is incompletely visualized, but no regional wall motion   abnormalities are noted in the visualized segments. Normal left   ventricular ejection fraction. The left ventricular ejection fraction by   visual estimate is 55% to 60%.   •  The right ventricle is normal in size. There is normal function of the   right ventricle.   •  The left atrium is normal in size. Left atrial volume is 58 mL.   •  The right atrial volume measures 52 mL. The right atrial volume index   measures 34 mL/m2.   •  There is trace mitral regurgitation. There is no mitral stenosis.   •  The aortic valve is trileaflet. There is mild aortic valve thickening.   There is no aortic stenosis. There is trace aortic regurgitation.   •  There is mild to moderate tricuspid regurgitation. Pulmonary artery   systolic pressure measures 52 mmHg. The pulmonary artery systolic pressure   is moderately elevated.   •  The aorta measures 3.2 cm at the sinus of Valsalva. The proximal   segment of the ascending aorta is mildly enlarged. The proximal segment of   the ascending aorta measures 3.4 cm. The proximal segment of the ascending   aorta measures 2.2 cm/m2, indexed for body surface area.   •  Trivial pericardial effusion present. There is no  echocardiographic   evidence of cardiac tamponade.   •  The inferior vena cava is normal in size (diameter <21 mm) and   decreases >50% in size with inspiration, suggesting a normal right atrial   pressure of 3 mmHg (range 0-5 mm Hg).   •  Compared to the prior study of 3/13/2020, there are no significant   changes.        PFT (3/23/2022):      PFT (7/14/2021):      CT Chest (5/2/2021, outside study):  CLINICAL INFORMATION: Systemic sclerosis. Interstitial lung disease. Groundglass nodule     PROCEDURE: Unenhanced CT of the chest was performed using thin section reconstructions. Images were obtained on inspiration and expiration.     COMPARISON: June and August 2020     FINDINGS:     LUNGS, PLEURA:     Groundglass opacities with reticulation with subpleural sparing in the lower lobes, without honeycombing or architectural distortion, unchanged.     Right upper lobe groundglass nodule, image 114 of series 3, 8 x 11 mm, previously 6 mm.     Expiratory images are of diagnostic quality and do not demonstrate evidence of clinically significant air trapping.     CARDIOVASCULAR, MEDIASTINUM, THYROID: Coronary calcifications. Trace pericardial effusion.     LYMPH NODES: Subcentimeter mediastinal lymph nodes, unchanged. Unchanged axillary lymph nodes.     SKELETON, CHEST WALL: No destructive bone lesion     UPPER ABDOMEN: Patulous esophagus. 3 mm right renal stone.       RHC (9/02/2020):  RA   8 mmHg   RV   40/5 mmHg   PA (mean)  44/16 (25) mmHg   PCWP   12 mmHg   CO   4.65 lpm (Thermodilution)   CI   2.65 lpm/m-squared   SVI    33 ml/beat/m-squared (lower limit normal 29)   PVR   2.8 mmHg/l/min (Wood units)   SVR   2064 dyne-sec-cm-5         PFT (3/18/2020):      CCTA (3/7/2024, personally reviewed):      LEFT MAIN: Patent.     LAD: Proximal: Calcified plaque with 30 to 50% stenosis.  Mid: Mixed plaque with  50 to 70% stenosis, CT FFR 0.89.  Distal: Patent.  D1: Patent  D2: Patent, CT FFR 0.88     LCX: The circumflex and 1  marginal branch are patent with normal CT FFR.     RCA: Proximal: Calcified plaque with minimal stenosis.  Otherwise the RCA, PDA,  and PLB are patent with normal CT FFR.     CORONARY CALCIUM COMPOSITE AGATSTON SCORE: 313  LM: LAD: 312    LCX: RCA: 1     This places the patient in the HICKS 96th risk percentile for age and gender  matched individuals.     VENTRICULAR FUNCTION AND WALL MOTION     LV EJECTION FRACTION: 66%  LV WALL MOTION: Normal    SUMMARY:  1. Two-vessel coronary artery disease as above that is moderate in severity.  CT  FFR is normal..  2. There is mitral annular calcification and aortic sclerosis.  The right atrium  is enlarged.  There is left ventricular hypertrophy.  There is a moderate to  large circumferential pericardial effusion.  3. Normal left ventricular wall motion and systolic function.  4. Coronary calcium score 313, 96th percentile         TTE (3/13/2020, outside study):  · The study quality was good.   · The left ventricle is normal in size. Top normal left ventricular wall   thickness. There are no segmental wall motion abnormalities. The left   ventricular ejection fraction is 55-60% by visual estimate and 3D   echocardiography. Normal left ventricular ejection fraction.   · There is grade I (mild) LV diastolic dysfunction.   · The right ventricle is normal in size. There is normal function of the   right ventricle.   · The right atrium is mildly dilated.   · There is mild mitral valve anterior leaflet thickening. There is mild   mitral valve leaflet calcification. There is flattening of the mitral   leaflets without raphael prolapse. There is trace mitral regurgitation.   · The aortic valve is trileaflet. There is mild thickening of the aortic   valve. There is mild calcification of the aortic valve. There is no aortic   /stenosis. There is trace aortic regurgitation.   · There is mild tricuspid valve leaflet thickening. There is mild to   moderate tricuspid regurgitation. The  pulmonary artery systolic pressure   is moderately elevated. Pulmonary artery systolic pressure measures 50   mmHg. There is mid-systolic notching of the RVOT VTI consistent with   elevated pulmonary vascular resistance.   · The inferior vena cava is normal in size (diameter <21 mm) and decreases   >50% in size with inspiration, suggesting a normal right atrial pressure   of 3 mmHg (range 0-5 mm Hg).   · Trivial loculated pericardial effusion present adjacent to the right   ventricle and adjacent to the right atrium.          Assessment / Plan:     1. Musculoskeletal Pain Including Chest Pain  - She reports chest, left shoulder, head, stomach and foot pain.   - Suspect this is auto-immune disease related inflammation; although, there has not been demonstrable improvement with Colchicine or steroids to date. Agree with steroid course.   - Consideration for Rheumatology consultation. As an outpatient, revisiting immune system suppression through biologics (or non-biologics) is reasonable.   - CCTA reasonable but likely low yield  - Reasonable to lower Colchicine dose to daily. If loose stools persist, can decrease dose to 0.3 mg daily    2. Pericardial Effusion  - Moderate in size without clinical tamponade  - Will continue to monitor  - Will recheck TTE at one month if no interim clinical change  - Reasonable to lower Colchicine dose to daily. If loose stools persist, can decrease dose to 0.3 mg daily    3. Pulmonary Hypertension (WHO Group I, NYHA/WHO FC III):   - Occurring in the background of Systemic Scleroderma with ILD. August 2022 TTE showed normal RV size and function, but progressive exertional decline, worsened DLCO and resting tachycardia suggest there may be progression of her pulmonary vascular disease and limitation of cardiac output. This was proven with 11/2022 RHC showing mean PA 35 mmHg (with PCWP 13 mmHg) and PVR 4.3 Wood units. June 2023 TTE showed increase in RVSP. New TTE with RVSP decreased to  40s (even with higher estimated RAP).  - Continue uninterrupted Sildenafil and Macitentan. As discussed with team, Sildenafil doses should be re-timed today so that she gets all three.     4. HTN:   - Borderline elevated.   - Continued Amlodipine.   - Would start HCTZ 25 mg daily - this will help with BP and will also contribute some Potassium wasting as she normally runs high normal    5. Systemic Scleroderma / Raynaud's Disease:   - Per Rheumatology (Outpatient: Dr. Giraldo).      6. ILD:   - Per Pulmonology and Rheumatology (Dr. Giraldo) outpatient.   - She has not tolerated trials of Mycophenolate.     7. PE:   - She has had elevated D-dimer and elevated probability on V/Q scans, but CTA Chest has consistently not shown evidence for acute or chronic thromboembolism. Apixaban has been discontinued.     8. CAD  - LAD and RCA non-obstructive disease on 3/7 CCTA  - LDL goal < 70  - Would start Aspirin 81 mg daily  - Would start Rosuvastatin 10 mg daily or Atorvastatin 20 mg daily    Timothy Arvizu, DO

## 2024-03-08 NOTE — DISCHARGE SUMMARY
Internal Medicine  Inpatient Discharge Summary        BRIEF OVERVIEW   Admitting Provider: Zachariah Hoff MD  Attending Provider: Shamir Leary MD Attending phys phone: [phone]    PCP: Sara Simmons [phone]    Admission Date: 3/3/2024  Discharge Date: 3/11/2024     DISCHARGE DIAGNOSES      Primary Discharge Diagnosis  Pericardial effusion    Secondary Discharge Diagnoses  Active Hospital Problems    Diagnosis Date Noted   • Interstitial lung disease (CMS/HCC) 11/28/2022     Priority: High   • Hypothyroid 03/04/2024   • History of pulmonary embolism 04/14/2023   • Acute on chronic heart failure with preserved ejection fraction (CMS/HCC) 04/14/2023   • Pericardial effusion 04/13/2023   • Pulmonary hypertension (CMS/HCC) 08/09/2022   • Open wound of right foot 07/11/2022      Resolved Hospital Problems   No resolved problems to display.       Active Problem List on Day of Discharge  Patient Active Problem List   Diagnosis   • Immunocompromised state (CMS/HCC)   • Open wound of right foot   • Essential (primary) hypertension   • Hyperlipidemia, unspecified   • Raynaud's disease   • Pulmonary hypertension (CMS/HCC)   • Shortness of breath   • Interstitial lung disease (CMS/HCC)   • Scleroderma (CMS/HCC)   • Right ventricular dysfunction   • Pericardial effusion   • Hemoptysis   • History of pulmonary embolism   • Vertigo   • Debility   • Dyspnea   • Weight loss   • Acute on chronic heart failure with preserved ejection fraction (CMS/HCC)   • Anemia   • Palliative care by specialist   • Hypothyroid     SUMMARY OF HOSPITALIZATION      Presenting Problem/History of Present Illness  This is a 63 y.o. year-old female admitted on 3/3/2024 with Pericardial effusion [I31.39]  Interstitial lung disease (CMS/HCC) [J84.9]  Hypoxia [R09.02].    This is a 63 y.o. female with a past medical history of cutaneous systemic scleroderma (dx 1998), pulmonary hypertension (WHO Group 1, on mecitentan and sildenafil),  ILD, Raynaud's disease, PE (3/2023, not on AC due to epistaxis and hemoptysis), GERD and HTN who presents with worsening SOB and chest pain for 24hrs. The patient stated she's been having worsening SOB and CP for the last year but it acutely worsened in the last 24 hours. She states its pressure like, over her sternum, without radiation into her arms, and she does endorse periodic palpitations and night sweats with nausea, as well as continued epistaxis and hemoptysis however these are not associated with her pain. She stated the pain is worse with exertion, and denies any fever, chills, vomiting, or diarrhea     In the ED:  Vitals: T 97.9, , RR 24, /67, SpO2 90% on RA with improvement to 96% on 1 L nasal cannula  Labs: Na 135, , AST 11, ALT 5, albumin 3.2, Tbili 0.2, lactate 1.1, troponin 12.6, WBC 8.06, Hgb 9,Plt 217, D-dimer 0.68,   UA: trace leukocyte esterase, 1+ protein, 4-10 WBCs, rare squamous epithelial cells  Imaging:  CXR: Stable cardiomegaly with diffuse chronic interstitial processes revisualized in the lungs lower lung field prominence no definite superimposed infiltrate identified  X-ray foot bilateral:  No osseous destruction to suggest osteomyelitis  CTA PE: No evidence of PE, moderate to large pericardial effusion, lower lobe lung field predominant interstitial thickening with relative subpleural sparing consistent with chronic ILD unchanged from prior study, continued bilateral axillary mediastinal and bilateral hilar lymphadenopathy not definitely changed from prior, emphysema  Interventions: 40 mg of Methylpred, 50 mg IV Benadryl  Patient was admitted to the ICU under the cardiology service for observation and was then transferred to regular floors.     Hospital course:  #Moderate to large pericardial effusion   #Pericarditis  Patient presented with increasing sub-sternal chest pressure of 1 days duration. Pain was initially non-radiating. EKG and troponin not concerning for  ACS. Endorsed GERD symptoms however no new nausea/vomiting/diarrhea. CT PE negative for PE but did show moderate to large pericardial effusion. TTE on 3/4 showed moderate circumferential pericardial effusion, mild LVH, EF 60%, grade II diastolic dysfunction, and no regional wall motion abnormalities or evidence of tamponade. BP remained stable ~130's systolic. TB panel negative. Blood cultures no growth to date. Given extensive list of allergies, allergist was consulted and recommended against using indomethacin to treat her pericarditis given history of shortness of breath with asiprin and ibuprofen. She was started on colchicine 0.6 mg BID on 3/4, and when she did not improve sympomatically prednisone 20 mg daily was added on 3/5. Patient reported new left shoulder pain during hospital stay. Multimodal pain regimen including tylenol, lidocaine patch, tramadol, hot packs used throughout stay. Patient on 3/7, developed multiple episodes of diarrhea and colchicine dose decreased to 0.3 mg. Coronary CTA done as part of the ischemic workup showed moderate 2 vessel CAD and patient recommended to begin atorvastatin 20 mg daily and plavix 75 mg daily, however, patient refused those medications.      - Continue colchicine 0.3 mg, will need prolonged course of at least 3 months  - Continue Prednisone 20 mg for a total two week course EOT 3/19. Will need follow up with Dr. Arvizu as outpatient for follow up.      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 Also noted continued bilateral axillary, mediastinal, and bilateral hilar LAD unchaned from prior study and possibly related to the patient's sarcoid. Patient was continued on home sildenafil and  Macitentin. She was continued on home amlodipine for Raynaud's. She will  be discharged on her home pHTN medications: macitentan and sildenafil      #Acute on chronic diastolic heart failure   TTE on 3/4 showed LVH, LVEF 60%, grade II diastolic dysfunction, and no regional wall motion abnormalities.      #History of Pulmonary embolism  Not on AC due to epistaxis and hemoptysis. SCDs for DVT ppx given hemoptysis.     #Hypothyroidism  Continued home synthroid      #Foot Ulcers/ Open wound of Right foot  Known history of bilateral LE ulcers and raynauds. Foot ulcers present on arrival.  Medial right and Left medial ankles and left medial great toe and tender to touch. Plain film of bilateral feet show no evidence of acute infection. Wound care consulted. Waffle boots and no sting skin barrier applied daily. No signs of infection during the admission.      #Asymptomatic bacteruria  Urine culture grew proteus mirabilis. Patient without urinary symptoms. She was monitored off antibiotics.     Exam on Day of Discharge  Physical Exam  HENT:      Head:      Comments: JVD   Cardiovascular:      Rate and Rhythm: Normal rate and regular rhythm.      Pulses: Normal pulses.      Heart sounds: Murmur heard.   Pulmonary:      Effort: Pulmonary effort is normal.      Breath sounds: Normal breath sounds.   Abdominal:      Palpations: Abdomen is soft.   Musculoskeletal:      Right lower leg: No edema.      Left lower leg: No edema.   Neurological:      Mental Status: She is alert. Mental status is at baseline.         Consults During Admission  IP CONSULT TO NUTRITION SERVICES  IP CONSULT TO WOUND OSTOMY CONTINENCE  IP CONSULT TO ALLERGY  IP CONSULT TO NUTRITION SERVICES    DISCHARGE MEDICATIONS   New, changed, or stopped medications from this admission:       Medication List      START taking these medications    colchicine 0.6 mg tablet  Commonly known as: COLCRYS  Take 0.5 tablets (0.3 mg total) by mouth daily.  Dose: 0.3 mg     loperamide 2 mg capsule  Commonly known as: IMODIUM  Take 1 capsule (2 mg  total) by mouth 3 (three) times a day as needed for diarrhea for up to 10 days.  Dose: 2 mg     pantoprazole 20 mg EC tablet  Commonly known as: PROTONIX  Take 1 tablet (20 mg total) by mouth daily.  Dose: 20 mg        CHANGE how you take these medications    predniSONE 20 mg tablet  Commonly known as: DELTASONE  Start taking on: March 12, 2024  Take 1 tablet (20 mg total) by mouth daily for 8 days.  Dose: 20 mg  What changed:   · medication strength  · how much to take  · when to take this  · additional instructions        CONTINUE taking these medications    * amLODIPine 5 mg tablet  Commonly known as: NORVASC  Take 10 mg by mouth every morning.  Dose: 10 mg     * amLODIPine 5 mg tablet  Commonly known as: NORVASC  Take 5-10 mg by mouth nightly as needed (for high BP or Raynauds phenomenon of the fingers).  Dose: 5-10 mg     biotin 1 mg tablet  Take 1,000 mcg by mouth daily.  Dose: 1,000 mcg     cholecalciferol (vitamin D3) 1,000 unit (25 mcg) tablet  Take 1,000 Units by mouth daily.  Dose: 1,000 Units     cyanocobalamin (vitamin B-12) 1,000 mcg capsule  Take 1,000 mcg by mouth daily.  Dose: 1,000 mcg     docusate sodium 100 mg capsule  Commonly known as: COLACE  Take 100 mg by mouth daily.  Dose: 100 mg     fluticasone-umeclidinium-vilanterol 100-62.5-25 mcg blister with device powder for inhalation  Commonly known as: TRELEGY ELLIPTA  Inhale 1 puff daily.  Dose: 1 puff     levothyroxine 100 mcg tablet  Commonly known as: SYNTHROID  Take 100 mcg by mouth daily.  Dose: 100 mcg     macitentan 10 mg tablet tablet  Commonly known as: OPSUMIT  Take 1 tablet (10 mg total) by mouth daily.  Dose: 10 mg     meclizine 25 mg tablet  Commonly known as: ANTIVERT  Take 25 mg by mouth daily as needed.  Dose: 25 mg     mupirocin 2 % ointment  Commonly known as: BACTROBAN  Apply 1 application. topically daily. Behind ears  Dose: 1 application.     sildenafiL (pulm.hypertension) 20 mg tablet  Commonly known as: REVATIO  Take 1  tablet (20 mg total) by mouth 3 (three) times a day.  Dose: 20 mg         * This list has 2 medication(s) that are the same as other medications prescribed for you. Read the directions carefully, and ask your doctor or other care provider to review them with you.            STOP taking these medications    ACTEMRA IV            Non-Medication orders at discharge:   Instructions for after discharge     Call provider for:  difficulty breathing, headache or visual disturbances      Call provider for:  extreme fatigue      Call provider for:  persistent dizziness or light-headedness      Call provider for:  persistent nausea or vomiting      Call provider for:  rash      Call provider for:  redness, tenderness, or signs of infection (pain, swelling, redness, odor or green/yellow discharge around incision site)      Call provider for:  severe uncontrolled pain      Call provider for:  temperature >100.4      Discharge diet      Diet Type / Texture: Regular    Follow-up with Provider:      Timothy Arvizu DO   [phone]    [address]  [facility]  [place] [address]       Follow-up with Provider:      Phani Giraldo MD   [phone]    RHEUMATOLOGY [facility]  [address] [place] [address], [facility] [address]       Follow-up with Provider:      Floridalma Trevizo DO   [phone]    [address]  [address]  [place] [address]       Follow-up with Provider:      Kai Wasserman MD   [phone]    [address]  [place]  [place] [address]       Follow-up with primary physician (PCP)      Post-Discharge Activity: Normal activity as tolerated.      Normal activity as tolerated.                    PROCEDURES / LABS / IMAGING      Operative Procedures  n/a    Other Procedures  n/a        Pertinent Imaging  X-RAY HAND LEFT 2 VIEWS    Result Date: 3/10/2024  IMPRESSION: No fracture, limited.    CTA CORONARY ARTERY WITH IV CONTRAST    Result Date: 3/8/2024  IMPRESSION: 1. See  above cardiologist summary. 2. Noncardiac portions of the examination were significant for bilateral emphysematous changes and bronchiectasis. Enhancing focus in the spleen likely representing a hemangioma. Significant pericardial effusion.. CT chest interpretation by Marco Antonio Butler M.D. on 3/7/2024.     X-RAY FOOT BILATERAL 3+VW    Result Date: 3/4/2024  IMPRESSION: No osseous destruction to suggest osteomyelitis.     CT ANGIOGRAPHY CHEST PULMONARY EMBOLISM WITH IV CONTRAST    Result Date: 3/3/2024  IMPRESSION: 1.  No evidence of acute pulmonary embolism to the level of the segmental branches. 2.  Moderate to large pericardial effusion measuring higher than simple fluid density. 3.  Lower lobe lung field predominant interstitial thickening with some relative subpleural sparing, consistent with a chronic interstitial lung disease, unchanged from the prior study. 4.  Continued bilateral axillary, mediastinal, and bilateral hilar lymphadenopathy, not definitely changed from the prior study, possibly related to the patient's sarcoid. 5.  Emphysema.     X-RAY CHEST 2 VIEWS    Result Date: 3/3/2024  IMPRESSION: 1.  Stable cardiomegaly. 2.  Diffuse chronic interstitial process revisualized in the lungs with a lower lung field predominance, not definitely changed. No definite superimposed infiltrate identified.       OUTPATIENT  FOLLOW-UP / REFERRALS / PENDING TESTS      Outpatient Follow-Up Appointments            In 2 weeks Dee Frank MD [facility] Medicine    In 3 months [facility]    In 3 months [facility]    In 3 months Timothy Arvizu DO [facility]          Referrals  No orders of the defined types were placed in this encounter.      Test Results Pending at Discharge  Unresulted Labs (From admission, onward)      Start     Ordered    03/08/24 0840  T4, free  Once        Question:  Release to patient  Answer:  Immediate    03/08/24 0839    03/04/24 0600  Comprehensive metabolic panel  Daily      Question:  Release to patient  Answer:  Immediate   Order ID Start Status   [id number] 03/08/24 0600 Needs to be Collected    03/09/24 0600 Scheduled    03/10/24 0600 Scheduled    03/11/24 0600 Scheduled    03/12/24 0600 Scheduled    03/13/24 0600 Scheduled       03/04/24 0542    03/03/24 2240  CBC  Daily      Question:  Release to patient  Answer:  Immediate   Order ID Start Status   [id number] 03/08/24 0600 Needs to be Collected    03/09/24 0600 Scheduled    03/10/24 0600 Scheduled    03/11/24 0600 Scheduled    03/12/24 0600 Scheduled       03/03/24 2239                Important Issues to Address in Follow-Up  Follow up with PCP in 1-2 weeks   Follow up with Dr. Arvizu in 1 week   Follow up with Rheumatology in the outpatient setting     DISCHARGE DISPOSITION      Disposition: Home     Code Status At Discharge: Full Code    Physician Order for Life-Sustaining Treatment Document Status      No documents found                 ATTENDING DOCUMENTATION  ALSO SEE ATTENDING ATTESTATION SECTION OF NOTE     Medicine Attending:     I personally evaluated and examined the patient. I reviewed the chart, laboratory data and radiological examinations, and determined the diagnosis and plan. I discussed the case with the Resident/KARYNA/ENRIQUE/sub-I medical student and agree with the findings and plan as documented in the note except for my comments below or within the additional notes today.     Patient reports had LH yesterday she thinks related to mirtazipine, so she was kept overnight. She is asymptomatic today and feels ready to go home. She will need close f/u with Dr Arvizu.   Afeb/vss  Exam No Acute Distress/nontoxic appearance, but chronically ill appearing, Very thin appearing, comfortable,  + JVD, RRR, e/o digital amputations, +vitiligo, skin  tightening appreciated  Labs cr 0.9, bg 71, wbc 10.6, hg 10.6     Plan:   Plan, in brief:   # Left Shoulder pain, improved  # Pericarditis, Pericardial effusion, multiple ddx, see below  -appreciate cardiology input, Colchicine (dose reduced in diarrhea) + Prednisone   -Quantiferon neg, coxsackie to f/u, CMV Negative, additional infectious workup as further described herein.  -CTA coronaries with nonoperative medical mgmt needed, statin and plavix recommended in aspirin allergy however patient refusing these.   - continue prednisone (20mg daily x2 wks total) and colchicine (lower dose per cardiology x12 weeks), needs close f/u with cardiology and repeat TTE.       # Diarrhea overnight   -Reduced dose of colchicine to minimum  due to possible side effect, stool bulking agents  - improved now     # ILD   # pHTN grp 1 iso Scleroderma  -imaging reviewed including ILD findings  -Cont PTA meds; Macitentan (started summer '23) brought from home, continue 10mg qd and sildenafil  -failed mycophenolate in the past seemingly. ILD ? 2/2 sarcoid per MAR     # Multiple Allergies  Appreciate allergy input, will not administer NSAIDs.      # HFpEF without suspicion of acute exacerbation  -Continue chronic regimen, outpatient f/u with Dr Arvizu     # Malnourished, Body mass index is 16.16 kg/m².  -nutrition consult appreciated, failed trial of Mirtazapine       Other-  # CAD - LAD and RCA non-obstructive disease on 3/7 CCTA, LDL goal < 70, recommended to START statin and plavix (no ASA due to allergy), however patient refusing despite education about importance. F/u cardiology to continue to discuss.      # Raynauds - cont PTA Amlodipine, was also on Prn amlodipine at home  # Hx PE -off eliquis since Jun '23, had epistaxis, hemoptysis  # Bilateral foot wounds, POA-Wound/ostomy consult   # Asymptomatic Bacteruria - no treatment indicated.   # Hypothyroid on home synthroid.   # DVT ppx -SCDs     DISPO - anticipate discharge  today     Shamir Leary MD  Pager #[phone]     NOTE:   Some or all of the note above was created with the use of dictation software, please note this dictation software can have abnormalities in its ability to transcribe. Please contact me directly for anything that seems abnormal, for clarification.

## 2024-03-08 NOTE — PROGRESS NOTES
Internal Medicine  Daily Progress Note       SUBJECTIVE   This is a 63 y.o. year-old female admitted on 3/3/2024 with Pericardial effusion [I31.39]  Interstitial lung disease (CMS/HCC) [J84.9]  Hypoxia [R09.02].    Interval History: Patient reports pain all over most notable in there left shoulder, stomach and left arm. Complaining that her heating pad is not working. Not complaining of CP, SOB. Reports About 6 episodes of loose soft stools last night and overnight.      OBJECTIVE   Vital signs in last 24 hours:  Temp:  [36.6 °C (97.8 °F)-36.7 °C (98.1 °F)] 36.7 °C (98.1 °F)  Heart Rate:  [58-76] 58  Resp:  [16-18] 18  BP: (143-168)/(67-79) 150/69  SpO2:  [92 %-100 %] 94 %  Oxygen Therapy: None (Room air)  O2 Delivery Method: Nasal cannula  O2 Flow Rate (L/min):  [2 L/min] 2 L/min    Weight & I/Os:  Weights (last 5 days)     Date/Time Weight Drug Calculation Weight (Dosing Weight)    03/08/24 0533 45.4 kg (100 lb 1.6 oz) --    03/07/24 0700 46.1 kg (101 lb 11.2 oz) --    03/06/24 0611 52.2 kg (115 lb 1.3 oz) --    03/05/24 0615 45.9 kg (101 lb 3.1 oz) --    03/04/24 0733 47.6 kg (105 lb) --    03/04/24 0000 48 kg (105 lb 13.1 oz) 48 kg (105 lb 13.1 oz)    03/03/24 1506 45.4 kg (100 lb) --          Intake/Output Summary (Last 24 hours) at 3/8/2024 0659  Last data filed at 3/7/2024 1253  24 Hour Net Input/Output from 7AM Yesterday   Intake 230 ml   Output --   Net 230 ml        PHYSICAL EXAMINATION   Physical Exam     LINES, CATHETERS, DRAINS, AIRWAYS, & WOUNDS   Lines, drains, airways, & wounds:  Peripheral IV (Adult) 03/03/24 Left Antecubital (Active)   Number of days: 4       Wound Other (comment) Anterior;Left Toe (Great) (Active)   Number of days: 3       Wound Pressure Injury Anterior;Left Ankle (Active)   Number of days: 3       Wound Pressure Injury Anterior;Right Ankle (Active)   Number of days: 3       Wound Other (comment) Anterior;Right Toe (2nd) (Active)   Number of days: 3        LABS, IMAGING, & TELE    Labs:  I have reviewed the patient's labs to the time of note. No new clinical concern.    Results from last 7 days   Lab Units 03/07/24  0525 03/06/24  0616 03/05/24  0403   WBC K/uL 8.14 8.38 7.58   HEMOGLOBIN g/dL 9.0* 8.8* 8.4*   HEMATOCRIT % 29.9* 29.1* 28.2*   PLATELETS K/uL 277 247 225       Results from last 7 days   Lab Units 03/07/24  0525 03/06/24  0616 03/05/24  0403   SODIUM mEQ/L 137 138 137   POTASSIUM mEQ/L 5.0 4.5 4.4   CHLORIDE mEQ/L 107 107 107   CO2 mEQ/L 20* 24 23   BUN mg/dL 25 24 27*   CREATININE mg/dL 0.9 0.9 0.9   CALCIUM mg/dL 8.9 8.8 8.5*   ALBUMIN g/dL 3.3* 3.2* 3.1*   BILIRUBIN TOTAL mg/dL 0.2* 0.3 0.3   ALK PHOS IU/L 55 52 52   ALT IU/L 7 7 5*   AST IU/L 13 15 14   GLUCOSE mg/dL 105* 73 76     Imaging personally reviewed (does not include unread studies):  No results found.  Telemetry/EKG:  I have independently reviewed the telemetry. No events for the last 24 hours.     ASSESSMENT & PLAN   * Pericardial effusion  Assessment & Plan  Ddx: autoimmune vs. Myocardial injury vs. Bacterial infection vs. Viral vs. Malignant   Patient has a PMH of phtn, scleroderma, CTEPH   pw increasing chest pressure x 1 day, without radiation into arms  Endorses continued GERD sxs however no new NVD  S/p Methylprednisolone x1 in ED   CT shows pericardial effusion   Echo: EF 60%, No regional wall abnormalities, Grade II diastolic dysfunction  ; Trop 12.9   [id number] and CRP 8.9   MRSA Nares: Negative  TB panel negative 2/27   BCx: NGTD      - cont Colchicine 0.6 mg BID - considering holding/ changing to daily from BID iso of new loose stools.   - start low dose prednisone 20mg daily (as indomethacin alternative, pt reports allerges to ASA and Ibuprofen)   - Coronary CTA with Ca score of 313 - medical management per cardiology.    - CMV qual   - TB panel 2/27 negative   - coxsackie FU qual   - CBC daily  - CMP daily   - scds for dvt ppx given hemooptysis   - Allergist recommendations appreciated  - VS  frequently     Subclinical hyperthyroidism  Assessment & Plan  Patient has a PMH of hypothyroidism 2/2 thyroidectomy for follicular carcinoma of the thyroid, on synthroid      - continue home thyroid medications   -  TSH 0.28, T4 wnl  - will monitor    Acute on chronic heart failure with preserved ejection fraction (CMS/HCC)  Assessment & Plan  Patient has a PMH of HFPEF with EF 65% , G2 diastolic dysfxn  Endorsing worsening SOB, denies any ANGEL LUIS, UFD or medication complaince difficulty   CT shows pericardial effusion without pleural effusions or pulmonary edema   Exam negative for ANGEL LUIS, crackles     Impression: 62 yo lady with PMH of scleroderma and PE, phtn on mecitentan and sildenafil pw worseing BOWSER and shob with new pericardial effusion on ct imaging, negative for pulmonary edema or other s/sxs of CHF exacerbation. Unlikely this is a CHF exacerbation.     - continue to monitor   - continue home medications     History of pulmonary embolism  Assessment & Plan  Patient has a PMH of phtn and PE tx w/ apixiban however cb epistaxis and hemoptysis   Stopped elequis in June 2023   pw worsening shob and new pericardial effusion   Continues to endorse hemoptysis and epistaxis worsening over last few days   Echo EF 60%, no regional wall motion abnormalities, grade II diastolic dysfunction    - scds for now for dvt ppx       Interstitial lung disease (CMS/[facility])  Assessment & Plan  Patient has a PMH of ILD, CTEPH and phtn on sildenafil, mecetentan, and scleroderma   Has been discussing Tocilizumab with rheumatology but hasn't started it yet   Echo: EF 60%, No regional wall abnormalities, Grade II diastolic dysfunction  Continue pain meds as able     Impression: confirmed ILD and precap phtn 2/2 scleroderma, pw worsening SHOB, cp, and new pericardial effusion     - continue home meds   - cont PTA amlodipine   - continue phtn meds as able   - asked family to bring in mecitentan for patient; otherwise may evaluate cards office  for samples       Pulmonary hypertension (CMS/HCC)  Assessment & Plan  Patient has a pMH of pulmonary htn, scleroderma, CTEPh and precapillary pHTN   Sees Dr. Arvizu   Multiple echos and RHC and C   Endorses continued worsening SHOB since 10/2022 with BOWSER, palpitations, bendopnea   3/4: Echo: EF 60%, No regional wall abnormalities, Grade II diastolic dysfunction  Most recent echo 6/2023 showed EF 60%, mild TR, RVSP 55, increased RV systolic pressures compared to April   Had a PE in march 2023, was on elequis however had hemoptysis and it was discontinued   VQ in sept 2023 showed mod-high prob of PE disease and CTA PE showed no acute filling defect but enlargmeent of PA cw PHTN    recently started mecitentan aug/sept 2023 wo much subjective change     Ddx: multifactorial phtn scleroderma ILD vs. CTEPH vs. Pericardial effusion vs. Combo thereof     Impression: 62yo lady with PMH of raynauds, phtn, cutaneous scleroderma, ILD, CTEPh pw worsening SHOB x 24 hours with new pericardial effusion seen on CT. Prior echos have shown increasing RVSP and worsening phtn, for which she is on sildenafil, mecitentan.     - continue sildenafil today  - continue mecitentan   - FU cards cs   - closely monitor BP  - SCDs for DVT given hemoptysis           Open wound of right foot  Assessment & Plan  Patient has multiple wounds noted on her feet bilaterally   Wound images from admission in the media tab   Is on CCB at home   States wounds have difficulty healing  Also now with poor nutritional status iso scleroderma and ILD     Ddx: nonhealing ulcers 2/2 autoimmune disease vs. 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 Has chronic nonhealing ulcers to her feet, most likely secondary to her scleroderma and poor nutritional status     - cont CCB  - wound ostomy consult   - consider pain  management   - nutrition consult                VTE Assessment: Padua    VTE Prophylaxis: Current anticoagulants:    •None    SCDs    Code Status: Full Code  Estimated discharge date: 3/9/2024       ATTENDING DOCUMENTATION  ALSO SEE ATTENDING ATTESTATION SECTION OF NOTE

## 2024-03-08 NOTE — PROGRESS NOTES
Transitions of Care Heart Failure GDMT Review  Pharmacy Note      SUMMARY   [place] [facility] [place]icardial effusion. Patient has a history of CHF with an EF of 65%.     Upon chart review, patient is on the following Goal Directed Medication Therapy (GDMT):     SGLT2i: N/A; indicated    To optimize patient's medication therapy, please consider adding the following medication(s):  - SGLT2-inhibitor pending clinical stability, cardiology recommendations, and if there are no contraindications    In regards to new medication(s), please reach out to CM/SW for price checking/affordability for patient.        MEDICATIONS     Medication List    Current Facility-Administered Medications:     acetaminophen (TYLENOL) tablet 650 mg, 650 mg, oral, q6h PRN, Patricia Haley DO, 650 mg at 03/06/24 2228    amLODIPine (NORVASC) tablet 10 mg, 10 mg, oral, q AM, Patricia Haley DO, 10 mg at 03/08/24 0836    [Provider Managed Hold] amLODIPine (NORVASC) tablet 5-10 mg, 5-10 mg, oral, Nightly PRN, Dee Arellano DO    cholecalciferol (vitamin D3) tablet 1,000 Units, 1,000 Units, oral, Daily, Dee Arellano DO, 1,000 Units at 03/08/24 0836    [START ON 3/9/2024] colchicine (COLCRYS) tablet 0.6 mg, 0.6 mg, oral, Daily, Wolf Bernard MD    cyanocobalamin (VITAMIN B12) tablet 100 mcg, 100 mcg, oral, Daily, Dee Arellano DO, 100 mcg at 03/08/24 0836    glucose chewable tablet 16-32 g of dextrose, 16-32 g of dextrose, oral, PRN **OR** dextrose 40 % oral gel 15-30 g of dextrose, 15-30 g of dextrose, oral, PRN **OR** glucagon (GLUCAGEN) injection 1 mg, 1 mg, intramuscular, PRN **OR** dextrose 50 % in water (D50) injection 12.5 g, 25 mL, intravenous, PRN, Dee Arellano, DO    insulin lispro U-100 (HumaLOG) pen 3-5 Units, 3-5 Units, subcutaneous, With meals & nightly, Wolf Bernard MD    levothyroxine (SYNTHROID) tablet 100 mcg, 100 mcg, oral,  Daily, Dee Arellano DO, 100 mcg at 03/08/24 0531    lidocaine (ASPERCREME) 4 % topical patch 1 patch, 1 patch, Topical, Daily, Sherita Panchal MD, 1 patch at 03/08/24 0836    loperamide (IMODIUM) capsule 2 mg, 2 mg, oral, 3x daily PRN, Wolf Bernard MD    meclizine (ANTIVERT) chewable tablet 25 mg, 25 mg, oral, 3x daily PRN, Wagner Chen MD, 25 mg at 03/05/24 1817    metoprolol (LOPRESSOR) injection 5 mg, 5 mg, intravenous, PRN, Timothy Arvizu DO, 5 mg at 03/07/24 1215    metoprolol tartrate (LOPRESSOR) tablet 50 mg, 50 mg, oral, q30 min PRN, Timothy Arvizu DO, 50 mg at 03/07/24 1000    mometasone-formoterol (DULERA 100) 100-5 mcg/actuation inhaler 2 puff, 2 puff, inhalation, BID (8a, 8p), 2 puff at 03/08/24 0836 **AND** tiotropium bromide (SPIRIVA RESPIMAT) 2.5 mcg/actuation inhaler 2 puff, 2 puff, inhalation, Daily (8a), Dee Arellano DO, 2 puff at 03/08/24 0836    mupirocin (BACTROBAN) 2 % ointment 1 Application, 1 Application, Topical, Daily, Dee Arellano DO, 1 Application at 03/08/24 0835    ondansetron ODT (ZOFRAN-ODT) disintegrating tablet 4 mg, 4 mg, oral, q8h PRN **OR** ondansetron (ZOFRAN) injection 4 mg, 4 mg, intravenous, q8h PRN, Dee Arellano DO, 4 mg at 03/06/24 1235    predniSONE (DELTASONE) tablet 20 mg, 20 mg, oral, Daily, Patricia Haley DO, 20 mg at 03/08/24 0836    Pt Own- Macitentan (OPSUMIT) tablet 10mg, 10 mg, oral, Daily, Dee Arellano DO, 10 mg at 03/08/24 0836    sildenafiL (pulm.hypertension) (REVATIO) tablet 20 mg, 20 mg, oral, TID, Octavio Giordano MD, 20 mg at 03/08/24 0836        Evita Hunt, PharmD, Transitions of Care Clinical Pharmacist    [place]: [phone]  Office: [phone]  (Time Spent: 10 minutes)  3/8/2024

## 2024-03-08 NOTE — STUDENT
Spoke to patient's daughters at bedside and gave a clinical update on the patient. I answered all of their questions and they were appreciative of the conversation.     Panchito Bains, MS3

## 2024-03-08 NOTE — DISCHARGE INSTRUCTIONS
Instructions:   You came to [facility] on 3/3/24 with shortness of breath and chest pain.     You were found to have pericarditis, which is inflammation of the tissue surrounding your heart. You were treated with colchicine and prednisone. You will continue taking colchicine 0.3 mg once per day. You will continue taking prednisone 20 mg daily until 3/19/24, then decrease the dose to 10 mg daily for 2 weeks until 4/2/2024,  then decrease dose to 5 mg daily for 2 weeks until 4/16. You will then follow up with Dr. Arvizu in the office on 4/18. You will also need an echocardiogram in one month in order to follow up on the fluid around your heart.     You were also found to have coronary artery disease. To treat this, we recommend you start a medication called atorvastatin. We also recommend you start a blood thinner medication clopidogrel. You refused these medications during admission. Please follow up promptly with your Cardiologist Dr Arvizu after discharge to further discuss these medications. You are at increased risk of having a heart attack in the future without these medications.    Please bring this discharge paperwork to all of your follow up appointments. If you experience recurrence of your symptoms, please do not hesitate to call your primary care doctor or present to the emergency department for evaluation. It was a pleasure taking care of you at [facility]! We wish you the best of health.     Medications:  Please START taking colchicine 0.3 mg daily.  Please START taking prednisone 20 mg daily until 3/19/24, then decrease the dose to 10 mg daily for 2 weeks until 4/2/2024,  then decrease dose to 5 mg daily for 2 weeks until 4/16     Please contact your cardiologist in case of any recurrence or worsening of your symptoms     Follow-up:  Please follow-up with your new Primary Care Provider, Dr. Dee Frank on 3/25/2024.     Please follow-up with your cardiologist, Dr. Arvizu on  4/18.     Please follow-up with your pulmonologist, Dr. Kai Wasserman. Make an appointment by calling [phone].    Please follow-up with [facility]. Make an appointment by calling [phone].

## 2024-03-08 NOTE — PROGRESS NOTES
Occupational Therapy -  Daily Treatment/Progress Note     Patient: Arielle GUAJARDO Isidro  Location: [place] [address]  MRN: [id number]  Today's date: 3/8/2024    HISTORY OF PRESENT ILLNESS     Arielle is a 63 y.o. female admitted on 3/3/2024 with Pericardial effusion [I31.39]  Interstitial lung disease (CMS/HCC) [J84.9]  Hypoxia [R09.02]. Principal problem is Pericardial effusion.    Past Medical History  Arielle has a past medical history of De Quervain's tenosynovitis, Essential (primary) hypertension, Follicular carcinoma of thyroid (CMS/HCC), Gastro-esophageal reflux, Hand ulceration (CMS/HCC), Hyperlipidemia, unspecified, Interstitial lung disease (CMS/HCC), Leg ulcer (CMS/HCC), Lung nodule, Lupus (CMS/HCC), Osteomyelitis of hand (CMS/HCC), Osteoporosis, Pulmonary hypertension (CMS/HCC), Raynaud's disease, Reflux esophagitis, Scleroderma (CMS/HCC), and Screening mammogram for breast cancer (05/04/2021).    History of Present Illness  Patient presented with SOB and CP, found to have pericardial effusion on imaging. Of note, patient has chronic b/l foot wounds and digital amputations. S/p TTE 3/4.     XR foot 3/4: negative for OM      PRIOR LEVEL OF FUNCTION AND LIVING ENVIRONMENT     Prior Level of Function    Flowsheet Row Most Recent Value   Dominant Hand right   Ambulation independent   Transferring independent   Toileting independent   Bathing assistive equipment and person   Dressing assistive person   Eating independent   IADLs assistive person   Driving/Transportation friends/family   Prior Level of Function Comment Caregivers through an agency 24/7 days a week, assist with all BADLs and IADLs   Assistive Device Currently Used at Home stair glide, shower chair        Prior Living Environment    Flowsheet Row Most Recent Value   People in Home alone   Current Living Arrangements home   Home Accessibility stairs within home (Group), stairs to enter home (Group)   Living Environment  Comment 2SH with 4 LARRY + rail, stair glide to bed/bath (tub shower). No KS.          VITALS AND PAIN        Objective   OBJECTIVE     Start time:  1102  End time:  1123  Session Length: 21 min  Mode of Treatment: co-treatment, occupational therapy (skilled A x 2)    General Observations  Patient received supine, in bed. She was agreeable to therapy, no issues or concerns identified by nurse prior to session.      Precautions: fall       Limitations/Impairments: sensory      OT Eval and Treat - 03/08/24 1102        Cognition    Orientation Status oriented x 4     Affect/Mental Status WFL     Follows Commands WFL     Cognitive Function WFL     Comment, Cognition Good insight, able to make needs known        Bed Mobility    Bed Mobility Activities rolling;right;left;scooting/bridging     [place] supervision     Assistive Device bed rails     Comment Pt able to roll and bridge to A with removal of bed pain, hygiene and repositioning in bed        Sit/Stand Transfer    Transfer Comments Pt deferred this date due to frequent diarrhea        Upper Body Dressing    Tasks don;doff;hospital gown     [place] minimum assist (75% or more patient effort)     Position long sitting     Adaptive Equipment none        Toileting    Tasks perform bladder hygiene     [place] dependent (less than 25% patient effort)     Position supine     Adaptive Equipment none        Self-Feeding    Comment Pt issued red foam to place onto  utensils to improve  during feeding ( RN educated on use of foam  and to A pt at meal time to place utensils onto foam)        Balance    Static Sitting Balance WFL     Dynamic Sitting Balance WFL     Sit to Stand Dynamic Balance not tested     Static Standing Balance not tested     Dynamic Standing Balance not tested     Comment, Balance Pt able to scoot up in bed,  and able to sit in long sitting with S, Pt performed dynamic activities in bed while in long sitting.  Pt deferred sitting EOB  or OOB today due to frequent diarrhea.                                Education Documentation  Self-Care, taught by Jie Swartz OT at 3/8/2024  2:23 PM.  Learner: Patient  Readiness: Eager  Method: Explanation  Response: Verbalizes Understanding  Comment: Role of OT, use of call bell, use of built up foam for feeding        Session Outcome  Patient supine, in bed at end of session, bed alarm on, all needs met, call light in reach, personal items in reach. Nursing notified about patient's performance and patient's position.    AM-PAC™ - ADL (Current Function)     Putting on/taking off regular lower body clothing 1 - Total   Bathing 2 - A Lot   Toileting 1 - Total   Putting on/taking off regular upper body clothing 3 - A Little   Help for taking care of personal grooming 3 - A Little   Eating meals 3 - A Little   AM-PAC™ ADL Score 13      ASSESSMENT AND PLAN     Progress Summary  [facility].  Pt deferred OOB or EOB activities this date due to frequent diarrhea.  Pt on bed pain upon arrival, assisted pt off of bed pan.  Pt S to roll, bridge and scoot toward HOB.  Dep toileting, Min A UB dressing.  Pt issued red foam built up  to place onto utensils during feeding.  RN educated on use and how to A pt to place foam onto utensils.  Pt is limited by pain, decreased balance, endurance and strength.  OT will continue to follow.    Patient/Family Therapy Goal Statement: decrease pain    OT Plan    Flowsheet Row Most Recent Value   Rehab Potential good, to achieve stated therapy goals at 03/08/2024 1102   Therapy Frequency 4 times/wk at 03/08/2024 1102   Planned Therapy Interventions activity tolerance training, adaptive equipment training, BADL retraining, functional balance retraining, occupation/activity based interventions, patient/caregiver education/training, strengthening exercise, transfer/mobility retraining at 03/08/2024 1102          OT Discharge Recommendations    Flowsheet Row Most Recent Value   OT  Recommended Discharge Disposition home with home health, home, home with assistance at 03/08/2024 1102   Anticipated Equipment Needs if Discharged Home (OT) other (see comments)  [platform walker] at 03/08/2024 1102               OT Goals    Flowsheet Row Most Recent Value   Transfer Goal 1    Activity/Assistive Device sit-to-stand/stand-to-sit, toilet, stand pivot at 03/06/2024 0956   [place] supervision required at 03/06/2024 0956   Time Frame by discharge at 03/06/2024 0956   Dressing Goal 1    Activity/Adaptive Equipment dressing skills, all at 03/06/2024 0956   [place] supervision required at 03/06/2024 0956   Time Frame by discharge at 03/06/2024 0956   Toileting Goal 1    Activity/Assistive Device toileting skills, all at 03/06/2024 0956   [place] supervision required at 03/06/2024 0956   Time Frame by discharge at 03/06/2024 0956   Grooming Goal 1    Activity/Assistive Device grooming skills, all at 03/06/2024 0956   [place] set-up required at 03/06/2024 0956   Time Frame by discharge at 03/06/2024 0956

## 2024-03-08 NOTE — NURSING NOTE
Patient with 6 episodes of loose soft creamy stool since beginning of shift 1900, MD made aware, new order 1x dose of  imodium.

## 2024-03-08 NOTE — STUDENT
Medical Student Note: For educational purposes only.  Do not use for coding or billing.     Medical Student Daily Progress Note    Subjective     Interval History: 10+ episodes of loose stool. Asking to see rheumatology and to get ensures.       Objective     Vital signs in last 24 hours:  Temp:  [36.6 °C (97.8 °F)-36.7 °C (98.1 °F)] 36.7 °C (98 °F)  Heart Rate:  [56-76] 63  Resp:  [18] 18  BP: (136-157)/(62-74) 136/62      Intake/Output Summary (Last 24 hours) at 3/8/2024 1239  Last data filed at 3/8/2024 1000  Gross per 24 hour   Intake 490 ml   Output --   Net 490 ml     Intake/Output this shift:  I/O this shift:  In: 480 [P.O.:480]  Out: -     Physical Exam:  General appearance: alert, appears stated age, cachectic and cooperative  Head: normocephalic, without obvious abnormality, atraumatic  Lungs: CTAB  Heart: regular rate and rhythm, S1 normal, prominent S2  Abdomen: soft, non-tender; bowel sounds normal; no masses, no organomegaly  Extremities: both hands with thickened skin and amputated digits. L trapezius 4/5, L shoulder adduction and abduction 4/5 strength  Skin: thickened and tightened skin, areas of hypopigmentation consistent with vitiligo    Imaging  Coronary CTA 3/7/24  1. Two-vessel coronary artery disease as above that is moderate in severity.  CT  FFR is normal..  2. There is mitral annular calcification and aortic sclerosis.  The right atrium  is enlarged.  There is left ventricular hypertrophy.  There is a moderate to  large circumferential pericardial effusion.  3. Normal left ventricular wall motion and systolic function.  4. Coronary calcium score 313, 96th percentile    VTE Assessment: TBD      Assessment/Plan     Expected Discharge Date:   3/9/2024    #Dispo: tomorrow. PT rec'd home with assistance    #Pericarditis with pericardial effusion  - continue prednisone 20 mg daily. Allergic to NSAIDs  - decreased colchicine to 0.6 mg once daily  - plan for prednisone taper outpatient with  cardiology    #Diarrhea  - decrease colchicine as above  - TSH 0.28, free T4 1.07. Overreplacement of thyroid hormone likely not a factor in diarrhea given normal free T4.  - add psyllium to bulk stools and decrease diarrhea    #CAD  - Coronary CTA: 2 vessel CAD, calcium score 96%ile  - ASCVD risk 10.4% (total cholesterol 48, HDL 49)  - begin medium intensity statin with atorvastatin 20 mg daily  - begin anti-platelet therapy with plavix 75 mg daily (allergic to aspirin)    #Scleroderma  #Raynauds phenomenon  #Pulmonary HTN  #Interstitial lung disease  - continue sildenafil, macitentan, amlodipine    #HFpEF  - continue amlodipine as above    #Hypothyroidism  #Subclinical hyperthyroidism 2/2 overreplacement  - continue home levothyroxine  - TSH 0.28  - free T4 1.07  - monitor TSH outpatient, no changes to regimen for now    #HTN  - cardiology: add HCTZ for HTN and K-wasting properties  - holding off on HCTZ for now given allergy to HCTZ  - continue amlodipine as above    #Foot ulcers  - Wound care: Apply no sting skin barrier daily. Apply waffle boots. Follow up with [facility] upon discharge as was instructed.    #BMI 16.4  - nutrition: Regular diet, ensure BID, boost once a day, MVI    #Hx PE not on anticoagulation  #DVT ppx  - SCDs

## 2024-03-08 NOTE — PROGRESS NOTES
Physical Therapy -  Daily Treatment/Progress Note     Patient: Arielle GUAJARDO Isidro  Location: [place] [address]  MRN: [id number]  Today's date: 3/8/2024    HISTORY OF PRESENT ILLNESS     Arielle is a 63 y.o. female admitted on 3/3/2024 with Pericardial effusion [I31.39]  Interstitial lung disease (CMS/HCC) [J84.9]  Hypoxia [R09.02]. Principal problem is Pericardial effusion.    Past Medical History  Arielle has a past medical history of De Quervain's tenosynovitis, Essential (primary) hypertension, Follicular carcinoma of thyroid (CMS/HCC), Gastro-esophageal reflux, Hand ulceration (CMS/HCC), Hyperlipidemia, unspecified, Interstitial lung disease (CMS/HCC), Leg ulcer (CMS/HCC), Lung nodule, Lupus (CMS/HCC), Osteomyelitis of hand (CMS/HCC), Osteoporosis, Pulmonary hypertension (CMS/HCC), Raynaud's disease, Reflux esophagitis, Scleroderma (CMS/HCC), and Screening mammogram for breast cancer (05/04/2021).    History of Present Illness  Patient presented with SOB and CP, found to have pericardial effusion on imaging. Of note, patient has chronic b/l foot wounds and digital amputations. S/p TTE 3/4.     XR foot 3/4: negative for OM      PRIOR LEVEL OF FUNCTION AND LIVING ENVIRONMENT     Prior Level of Function    Flowsheet Row Most Recent Value   Dominant Hand right   Ambulation independent   Transferring independent   Toileting independent   Bathing assistive equipment and person   Dressing assistive person   Eating independent   IADLs assistive person   Driving/Transportation friends/family   Prior Level of Function Comment Caregivers through an agency 24/7 days a week, assist with all BADLs and IADLs   Assistive Device Currently Used at Home stair glide, shower chair        Prior Living Environment    Flowsheet Row Most Recent Value   People in Home alone   Current Living Arrangements home   Home Accessibility stairs within home (Group), stairs to enter home (Group)   Living Environment  Comment 2SH with 4 LARRY + rail, stair glide to bed/bath (tub shower). No LA.        VITALS AND PAIN     PT Pain    Date/Time Pain Type Side/Orientation Location Rating: Rest Interventions [facility]   03/08/24 1101 Pain Assessment bilateral foot feet, arms chest 6 pillow support provided;position adjusted TER           Objective   OBJECTIVE     Start time:  1101  End time:  1121  Session Length: 20 min  Mode of Treatment: co-treatment, physical therapy (pt activity tolerance/pain tolerance)    General Observations  Patient received supine, in bed. She was agreeable to therapy, no issues or concerns identified by nurse prior to session. On bed pain upon arrival. Declining all OOB activity. Reports has been doing her Dong.    Precautions: fall       Limitations/Impairments: sensory      PT Eval and Treat - 03/08/24 1101        Cognition    Orientation Status oriented x 4     Affect/Mental Status WFL        Bed Mobility    Bed Mobility Activities right;rolling;scooting/bridging     [place] supervision     Safety/Cues increased time to complete;technique     Assistive Device bed rails     Comment Rolling for incontience clean up. Able to scoot and bridge to top of bed with trendelenburg. Education on positioning due to foot wounds.        Mobility Belt    Mobility Belt Used for All Out of Bed Activity no     Reason Mobility Belt Not Used other (see comments)     Reason Mobility Belt Not Used No OOB activity        Balance    Static Sitting Balance not tested     Dynamic Sitting Balance not tested     Sit to Stand Dynamic Balance not tested     Static Standing Balance not tested     Dynamic Standing Balance not tested     Comment, Balance Pt refusing sitting at EOB or OOB activity        Lower Extremity (Therapeutic Exercise)    Comment Declined Dong reports has been performing supine bed Dong on her own        Impairments/Safety Issues    Impairments Affecting Function balance;coordination;endurance/activity  tolerance;pain;sensation/sensory awareness     Functional Endurance Limited by pain and fear of pain                                Education Documentation  Joint Mobility/Strength, taught by Alyssa Freed PTA at 3/8/2024  2:00 PM.  Learner: Patient  Readiness: Acceptance  Method: Explanation  Response: Verbalizes Understanding  Comment: Role of PT, goals, concerns about returning home, possible equipment needs.    Assistive/Adaptive Devices, taught by Alyssa Freed PTA at 3/8/2024  2:00 PM.  Learner: Patient  Readiness: Acceptance  Method: Explanation  Response: Verbalizes Understanding  Comment: Role of PT, goals, concerns about returning home, possible equipment needs.        Session Outcome  Patient supine, in bed at end of session, bed alarm on, all needs met, call light in reach, personal items in reach. Nursing notified about patient's performance, patient's position, and patient's response to therapy/activity.    AM-PAC™ - Mobility (Current Function)     Turning form your back to your side while in flat bed without using bedrails 3 - A Little   Moving from lying on your back to sitting on the side of a flat bed without using bedrails 3 - A Little   Moving to and from a bed to a chair 2 - A Lot   Standing up from a chair using your arms 2 - A Lot   To walk in a hospital room 1 - Total   Climbing 3-5 steps with a railing 1 - Total   AM-PAC™ Mobility Score 12      ASSESSMENT AND PLAN     Progress Summary  Limited session due again declining OOB activity due to foot pain. Pt reports she has been completing supine Dong to maintain her strength. Only participation with bed mobility which pt was able to perform at a supervision level. Pt may need increased support or adaptive equipment if continues to declined OOB activity. Pt reports she has been dragging her feet at home and addressed there is a high concern for worsening of her B/L foot wounds. She may be indicated for wheelchair level activity  and transfer board if and when she is agreeable. Will continue to follow for recommendations.    Patient/Family Therapy Goals Statement: To move better    PT Plan    Flowsheet Row Most Recent Value   Rehab Potential fair, will monitor progress closely at 03/08/2024 1101   Therapy Frequency 4 times/wk at 03/08/2024 1101   Planned Therapy Interventions balance training, bed mobility training, gait training, home exercise program, strengthening, stretching, transfer training at 03/08/2024 1101          PT Discharge Recommendations    Flowsheet Row Most Recent Value   PT Recommended Discharge Disposition home with assistance, home with home health at 03/08/2024 1101   Anticipated Equipment Needs if Discharged Home (PT) other (see comments), transfer board, wheelchair, wheelchair cushion  [platform RW] at 03/08/2024 1101               PT Goals    Flowsheet Row Most Recent Value   Bed Mobility Goal 1    Activity/Assistive Device bed mobility activities, all at 03/06/2024 1000   [place] supervision required at 03/06/2024 1000   Time Frame by discharge at 03/06/2024 1000   Progress/Outcome goal ongoing at 03/06/2024 1000   Transfer Goal 1    Activity/Assistive Device sit-to-stand/stand-to-sit, bed-to-chair/chair-to-bed at 03/06/2024 1000   [place] supervision required at 03/06/2024 1000   Time Frame by discharge at 03/06/2024 1000   Progress/Outcome goal ongoing at 03/06/2024 1000   Gait Training Goal 1    Activity/Assistive Device gait (walking locomotion), decrease asymmetrical patterns, decrease fall risk, diminish gait deviation, improve balance and speed, increase endurance/gait distance, increase energy conservation at 03/06/2024 1000   [place] supervision required at 03/06/2024 1000   Distance 100 at 03/06/2024 1000   Time Frame by discharge at 03/06/2024 1000   Progress/Outcome goal ongoing at 03/06/2024 1000

## 2024-03-09 ENCOUNTER — APPOINTMENT (INPATIENT)
Dept: RADIOLOGY | Facility: HOSPITAL | Age: 64
DRG: 315 | End: 2024-03-09
Payer: COMMERCIAL

## 2024-03-09 LAB
ALBUMIN SERPL-MCNC: 3.4 G/DL (ref 3.5–5.7)
ALP SERPL-CCNC: 57 IU/L (ref 34–125)
ALT SERPL-CCNC: 11 IU/L (ref 7–52)
ANION GAP SERPL CALC-SCNC: 10 MEQ/L (ref 3–15)
AST SERPL-CCNC: 16 IU/L (ref 13–39)
BILIRUB SERPL-MCNC: 0.3 MG/DL (ref 0.3–1.2)
BUN SERPL-MCNC: 27 MG/DL (ref 7–25)
C DIFF TOX GENS STL QL NAA+PROBE: NEGATIVE
CALCIUM SERPL-MCNC: 8.9 MG/DL (ref 8.6–10.3)
CHLORIDE SERPL-SCNC: 105 MEQ/L (ref 98–107)
CO2 SERPL-SCNC: 22 MEQ/L (ref 21–31)
CREAT SERPL-MCNC: 0.9 MG/DL (ref 0.6–1.2)
EGFRCR SERPLBLD CKD-EPI 2021: >60 ML/MIN/1.73M*2
ERYTHROCYTE [DISTWIDTH] IN BLOOD BY AUTOMATED COUNT: 18.8 % (ref 11.7–14.4)
GLUCOSE BLD-MCNC: 122 MG/DL (ref 70–99)
GLUCOSE BLD-MCNC: 95 MG/DL (ref 70–99)
GLUCOSE SERPL-MCNC: 58 MG/DL (ref 70–99)
HCT VFR BLD AUTO: 33.7 % (ref 35–45)
HGB BLD-MCNC: 10.2 G/DL (ref 11.8–15.7)
LABORATORY COMMENT REPORT: NORMAL
MCH RBC QN AUTO: 26 PG (ref 28–33.2)
MCHC RBC AUTO-ENTMCNC: 30.3 G/DL (ref 32.2–35.5)
MCV RBC AUTO: 86 FL (ref 83–98)
PDW BLD AUTO: 11.7 FL (ref 9.4–12.3)
PLATELET # BLD AUTO: 308 K/UL (ref 150–369)
POCT TEST: ABNORMAL
POCT TEST: NORMAL
POTASSIUM SERPL-SCNC: 3.7 MEQ/L (ref 3.5–5.1)
PROT SERPL-MCNC: 7.8 G/DL (ref 6–8.2)
RBC # BLD AUTO: 3.92 M/UL (ref 3.93–5.22)
SODIUM SERPL-SCNC: 137 MEQ/L (ref 136–145)
WBC # BLD AUTO: 13.17 K/UL (ref 3.8–10.5)

## 2024-03-09 PROCEDURE — 63700000 HC SELF-ADMINISTRABLE DRUG

## 2024-03-09 PROCEDURE — 21400000 HC ROOM AND CARE CCU/INTERMEDIATE

## 2024-03-09 PROCEDURE — 99233 SBSQ HOSP IP/OBS HIGH 50: CPT | Performed by: HOSPITALIST

## 2024-03-09 PROCEDURE — 63700000 HC SELF-ADMINISTRABLE DRUG: Performed by: STUDENT IN AN ORGANIZED HEALTH CARE EDUCATION/TRAINING PROGRAM

## 2024-03-09 PROCEDURE — 85027 COMPLETE CBC AUTOMATED: CPT

## 2024-03-09 PROCEDURE — 73120 X-RAY EXAM OF HAND: CPT | Mod: LT

## 2024-03-09 PROCEDURE — 87493 C DIFF AMPLIFIED PROBE: CPT

## 2024-03-09 PROCEDURE — 80053 COMPREHEN METABOLIC PANEL: CPT

## 2024-03-09 PROCEDURE — 36415 COLL VENOUS BLD VENIPUNCTURE: CPT

## 2024-03-09 RX ORDER — ACETAMINOPHEN 325 MG/1
650 TABLET ORAL EVERY 4 HOURS PRN
Status: DISCONTINUED | OUTPATIENT
Start: 2024-03-09 | End: 2024-03-12

## 2024-03-09 RX ORDER — ATORVASTATIN CALCIUM 20 MG/1
20 TABLET, FILM COATED ORAL
Qty: 30 TABLET | Refills: 0 | Status: CANCELLED | OUTPATIENT
Start: 2024-03-09 | End: 2024-04-08

## 2024-03-09 RX ORDER — CLOPIDOGREL BISULFATE 75 MG/1
75 TABLET ORAL DAILY
Qty: 30 TABLET | Refills: 0 | Status: CANCELLED | OUTPATIENT
Start: 2024-03-09 | End: 2024-04-08

## 2024-03-09 RX ORDER — TRAMADOL HYDROCHLORIDE 50 MG/1
25 TABLET ORAL ONCE
Status: COMPLETED | OUTPATIENT
Start: 2024-03-09 | End: 2024-03-09

## 2024-03-09 RX ORDER — COLCHICINE 0.6 MG/1
0.3 TABLET ORAL DAILY
Status: DISCONTINUED | OUTPATIENT
Start: 2024-03-09 | End: 2024-03-16 | Stop reason: HOSPADM

## 2024-03-09 RX ADMIN — MOMETASONE FUROATE AND FORMOTEROL FUMARATE DIHYDRATE 2 PUFF: 100; 5 AEROSOL RESPIRATORY (INHALATION) at 20:02

## 2024-03-09 RX ADMIN — TRAMADOL HYDROCHLORIDE 25 MG: 50 TABLET ORAL at 00:29

## 2024-03-09 RX ADMIN — MUPIROCIN 1 APPLICATION: 20 OINTMENT TOPICAL at 10:35

## 2024-03-09 RX ADMIN — COLCHICINE 0.3 MG: 0.6 TABLET ORAL at 10:31

## 2024-03-09 RX ADMIN — SILDENAFIL 20 MG: 20 TABLET, FILM COATED ORAL at 10:30

## 2024-03-09 RX ADMIN — PREDNISONE 20 MG: 20 TABLET ORAL at 10:31

## 2024-03-09 RX ADMIN — MOMETASONE FUROATE AND FORMOTEROL FUMARATE DIHYDRATE 2 PUFF: 100; 5 AEROSOL RESPIRATORY (INHALATION) at 10:34

## 2024-03-09 RX ADMIN — LEVOTHYROXINE SODIUM 100 MCG: 0.1 TABLET ORAL at 06:08

## 2024-03-09 RX ADMIN — AMLODIPINE BESYLATE 10 MG: 10 TABLET ORAL at 10:29

## 2024-03-09 RX ADMIN — ACETAMINOPHEN 650 MG: 325 TABLET ORAL at 20:01

## 2024-03-09 RX ADMIN — LIDOCAINE 1 PATCH: 4 PATCH TOPICAL at 10:30

## 2024-03-09 RX ADMIN — SILDENAFIL 20 MG: 20 TABLET, FILM COATED ORAL at 14:11

## 2024-03-09 RX ADMIN — TRAMADOL HYDROCHLORIDE 25 MG: 50 TABLET, COATED ORAL at 04:12

## 2024-03-09 RX ADMIN — SILDENAFIL 20 MG: 20 TABLET, FILM COATED ORAL at 20:01

## 2024-03-09 RX ADMIN — Medication 100 MCG: at 10:31

## 2024-03-09 RX ADMIN — ACETAMINOPHEN 650 MG: 325 TABLET ORAL at 04:11

## 2024-03-09 RX ADMIN — TIOTROPIUM BROMIDE INHALATION SPRAY 2 PUFF: 3.12 SPRAY, METERED RESPIRATORY (INHALATION) at 10:34

## 2024-03-09 RX ADMIN — Medication 1000 UNITS: at 10:30

## 2024-03-09 RX ADMIN — ACETAMINOPHEN 650 MG: 325 TABLET ORAL at 10:28

## 2024-03-09 ASSESSMENT — COGNITIVE AND FUNCTIONAL STATUS - GENERAL
MOVING TO AND FROM BED TO CHAIR: 2 - A LOT
WALKING IN HOSPITAL ROOM: 2 - A LOT
CLIMB 3 TO 5 STEPS WITH RAILING: 2 - A LOT
STANDING UP FROM CHAIR USING ARMS: 2 - A LOT
MOVING TO AND FROM BED TO CHAIR: 2 - A LOT
WALKING IN HOSPITAL ROOM: 2 - A LOT
STANDING UP FROM CHAIR USING ARMS: 2 - A LOT
CLIMB 3 TO 5 STEPS WITH RAILING: 2 - A LOT

## 2024-03-09 NOTE — NURSING NOTE
VSS t/o shift. Wound care performed. Multiple loose BM t/o shift. No acute events t/o shift, fsp maintained, call bell within reach

## 2024-03-09 NOTE — NURSING NOTE
"Important note for attending physician:    STOPBang completed as per COPD or HF Pathway  (Snoring, Tiredness, Observed red flags, BP Medication, BMI, Age, Neck Circumference, Gender)     The STOPBang is a \"patient-completed\" questionnaire that can quantify the risk of Obstructive Sleep Apnea (VIDA)    STOP-Bang Total Score: 3    0-2 []  Low Risk 3-4 [x]   Medium Risk 5-8  []  High Risk     If your patient scores >4 (medium to high risk for VIDA) please highly recommend a discussion with their primary care provider regarding an outpatient sleep study at one of our [facility].    • On discharge you can facilitate outpatient follow-up by:    o In either the discharge instructions section or the AVS enter \"SLEEP STUDY\" into the smartext box and click enter, select the smarttext \"[facility] IP RECOMMEND AMB SLEEP STUDY\" which will place documentation into the discharge instructions/AVS recommending the sleep study to the patient, then complete the discharge process.      Or    o Place After Visit Procedure order \"Polysomnogram (sleep study)\" and select preferred site: BM/NSQ, [facility],  or     • A [facility] representative will reach out to patient and primary care provider to arrange follow-up.    Thank you.  Respiratory Care Department  "

## 2024-03-09 NOTE — PROGRESS NOTES
Internal Medicine  Daily Progress Note       SUBJECTIVE   This is a 63 y.o. year-old female admitted on 3/3/2024 with Pericardial effusion [I31.39]  Interstitial lung disease (CMS/HCC) [J84.9]  Hypoxia [R09.02].    Interval History:   Patient overnight reports that she had around 8 episodes of watery diarrhea. She also reports that she is having left wrist pain. She is tolerating p/o intake.      OBJECTIVE   Vital signs in last 24 hours:  Temp:  [36.6 °C (97.8 °F)-37.2 °C (99 °F)] 37.2 °C (99 °F)  Heart Rate:  [63-75] 63  Resp:  [14-18] 18  BP: (125-157)/(60-69) 136/65  SpO2:  [93 %-98 %] 97 %  Oxygen Therapy: None (Room air)    Weight & I/Os:  Weights (last 5 days)     Date/Time Weight Drug Calculation Weight (Dosing Weight)    03/09/24 0608 42 kg (92 lb 9.5 oz) --    03/08/24 0533 45.4 kg (100 lb 1.6 oz) --    03/07/24 0700 46.1 kg (101 lb 11.2 oz) --    03/06/24 0611 52.2 kg (115 lb 1.3 oz) --    03/05/24 0615 45.9 kg (101 lb 3.1 oz) --    03/04/24 0733 47.6 kg (105 lb) --    03/04/24 0000 48 kg (105 lb 13.1 oz) 48 kg (105 lb 13.1 oz)          Intake/Output Summary (Last 24 hours) at 3/9/2024 0659  Last data filed at 3/8/2024 1000  24 Hour Net Input/Output from 7AM Yesterday   Intake 480 ml   Output --   Net 480 ml        PHYSICAL EXAMINATION   Physical Exam  Neck:      Comments: JVD  Cardiovascular:      Rate and Rhythm: Normal rate and regular rhythm.      Pulses: Normal pulses.      Heart sounds: Normal heart sounds.   Pulmonary:      Effort: Pulmonary effort is normal.      Breath sounds: Normal breath sounds.   Abdominal:      Palpations: Abdomen is soft.   Musculoskeletal:      Right lower leg: No edema.      Left lower leg: No edema.   Neurological:      Mental Status: She is alert. Mental status is at baseline.          LINES, CATHETERS, DRAINS, AIRWAYS, & WOUNDS   Lines, drains, airways, & wounds:  Peripheral IV (Adult) 03/03/24 Left Antecubital (Active)   Number of days: 6       Wound Other (comment)  Anterior;Left Toe (Great) (Active)   Number of days: 5       Wound Pressure Injury Anterior;Left Ankle (Active)   Number of days: 5       Wound Pressure Injury Anterior;Right Ankle (Active)   Number of days: 5       Wound Other (comment) Anterior;Right Toe (2nd) (Active)   Number of days: 5        LABS, IMAGING, & TELE   Labs:  I have reviewed the patient's pertinent labs. Pertinent labs are within normal limits.    Results from last 7 days   Lab Units 03/09/24  0406 03/08/24  1056 03/07/24  0525   WBC K/uL 13.17* 11.52* 8.14   HEMOGLOBIN g/dL 10.2* 9.9* 9.0*   HEMATOCRIT % 33.7* 32.2* 29.9*   PLATELETS K/uL 308 314 277       Results from last 7 days   Lab Units 03/09/24  0406 03/08/24  1056 03/07/24  0525   SODIUM mEQ/L 137 138 137   POTASSIUM mEQ/L 3.7 4.1 5.0   CHLORIDE mEQ/L 105 108* 107   CO2 mEQ/L 22 23 20*   BUN mg/dL 27* 30* 25   CREATININE mg/dL 0.9 0.9 0.9   CALCIUM mg/dL 8.9 9.0 8.9   ALBUMIN g/dL 3.4* 3.2* 3.3*   BILIRUBIN TOTAL mg/dL 0.3 0.2* 0.2*   ALK PHOS IU/L 57 52 55   ALT IU/L 11 10 7   AST IU/L 16 15 13   GLUCOSE mg/dL 58* 90 105*     Imaging personally reviewed (does not include unread studies):  No results found.     ASSESSMENT & PLAN   Interstitial lung disease (CMS/HCC)  Assessment & Plan  Patient has a PMH of ILD, CTEPH and phtn on sildenafil, mecetentan, and scleroderma   Has been discussing Tocilizumab with rheumatology but hasn't started it yet   Echo: EF 60%, No regional wall abnormalities, Grade II diastolic dysfunction  Continue pain meds as able     Impression: confirmed ILD and precap phtn 2/2 scleroderma, pw worsening SHOB, cp, and new pericardial effusion     - continue home meds   - cont PTA amlodipine   - continue phtn meds as able   - macitanetan and sildenafil       * Pericardial effusion  Assessment & Plan  Ddx: autoimmune vs. Myocardial injury vs. Bacterial infection vs. Viral vs. Malignant   Patient has a PMH of phtn, scleroderma, CTEPH   pw increasing chest pressure x 1  day, without radiation into arms  Endorses continued GERD sxs however no new NVD  S/p Methylprednisolone x1 in ED   CT shows pericardial effusion   Echo: EF 60%, No regional wall abnormalities, Grade II diastolic dysfunction  ; Trop 12.9   NND676 and CRP 8.9   MRSA Nares: Negative  TB panel negative 2/27   BCx: NGTD      - stated colchicine but dropping dose due to diarrhea to colchicine 0.3. cdiff pending  - start low dose prednisone 20mg daily (as indomethacin alternative, pt reports allerges to ASA and Ibuprofen)   - coronary CTA showed moderate 2 vessel CAD. Recommend plavix and statin but patient refusing medications.   - CMV qual   - TB panel 2/27 negative   - coxsackie FU qual   - CBC daily  - CMP daily   - scds for dvt ppx given hemooptysis   - Allergist recommendations appreciated  - VS frequently       Hypothyroid  Assessment & Plan  Patient has a PMH of hypothyroidism 2/2 thyroidectomy for follicular carcinoma of the thyroid     Note H/o Total Thyroidectomy (Dr. Pacheco at [facility]; 4/2013)  - continue home thyroid medications Synthroid 100mcg qd    Acute on chronic heart failure with preserved ejection fraction (CMS/HCC)  Assessment & Plan  Patient has a PMH of HFPEF with EF 65%   Endorsing worsening HSOB, denies any ANGEL LUIS, UFD or medication complaince difficulty   CT shows pericardial effusion without pleural effusions or pulmonary edema   Exam negative for ANGEL LUIS, crackles     Impression: 62 yo lady with PMH of scleroderma and PE, phtn on mecitentan and sildenafil pw worseing BOWSER and shob with new pericardial effusion on ct imaging, negative for pulmonary edema or other s/sxs of CHF exacerbation. Unlikely this is a CHF exacerbation.     - continue to monitor   - continue home medications     History of pulmonary embolism  Assessment & Plan  Patient has a PMH of phtn and PE tx w/ apixiban however cb epistaxis and hemoptysis   Stopped elequis in June 2023   pw worsening shob and new pericardial effusion    Continues to endorse hemoptysis and epistaxis worsening over last few days   Echo EF 60%, no regional wall motion abnormalities, grade II diastolic dysfunction    - scds for now for dvt ppx       Pulmonary hypertension (CMS/HCC)  Assessment & Plan  Patient has a pMH of pulmonary htn, scleroderma, CTEPh and precapillary pHTN   Sees Dr. Arvizu   Multiple echos and RHC and LHC   Endorses continued worsening SHOB since 10/2022 with BOWSER, palpitations, bendopnea   3/4: Echo: EF 60%, No regional wall abnormalities, Grade II diastolic dysfunction  Most recent echo 6/2023 showed EF 60%, mild TR, RVSP 55, increased RV systolic pressures compared to April   Had a PE in march 2023, was on elequis however had hemoptysis and it was discontinued   VQ in sept 2023 showed mod-high prob of PE disease and CTA PE showed no acute filling defect but enlargmeent of PA cw PHTN    recently started mecitentan aug/sept 2023 wo much subjective change     Ddx: multifactorial phtn scleroderma ILD vs. CTEPH vs. Pericardial effusion vs. Combo thereof     Impression: 62yo lady with PMH of raynauds, phtn, cutaneous scleroderma, ILD, CTEPh pw worsening SHOB x 24 hours with new pericardial effusion seen on CT. Prior echos have shown increasing RVSP and worsening phtn, for which she is on sildenafil, mecitentan.     - continue sildenafil   - continue mecitentan   - cards on board   - closely monitor BP in ICU   - SCDs for DVT given hemoptysis            Open wound of right foot  Assessment & Plan  Patient has multiple wounds noted on her feet bilaterally   Wound images from admission in the media tab   Is on CCB at home   States wounds have difficulty healing  Also now with poor nutritional status iso scleroderma and ILD     Ddx: nonhealing ulcers 2/2 autoimmune disease vs. Poor nutritional status     Impression: 62yo lady with scleroderma causing phtn, ild, chronic extremity nonhealing ulcers sp mutliple amputations, raynauds pw shob and new  pericardial effusion. Has chronic nonhealing ulcers to her feet, most likely secondary to her scleroderma and poor nutritional status     - cont CCB  - wound ostomy on board   - consider pain management   - nutrition consult        VTE Assessment: [place]    VTE Prophylaxis: Current anticoagulants:    •None      Code Status: Full Code  Estimated discharge date: 3/9/2024         ATTENDING DOCUMENTATION  ALSO SEE ATTENDING ATTESTATION SECTION OF NOTE

## 2024-03-09 NOTE — PROGRESS NOTES
Signed out as weekend task - per dr Ortez at [facility], pt is not medically cleared for discharge this weekend. ENRIQUE TBD pend progress.

## 2024-03-10 LAB
ALBUMIN SERPL-MCNC: 3 G/DL (ref 3.5–5.7)
ALP SERPL-CCNC: 55 IU/L (ref 34–125)
ALT SERPL-CCNC: 9 IU/L (ref 7–52)
ANION GAP SERPL CALC-SCNC: 8 MEQ/L (ref 3–15)
AST SERPL-CCNC: 11 IU/L (ref 13–39)
BILIRUB SERPL-MCNC: 0.2 MG/DL (ref 0.3–1.2)
BUN SERPL-MCNC: 21 MG/DL (ref 7–25)
CALCIUM SERPL-MCNC: 8.6 MG/DL (ref 8.6–10.3)
CHLORIDE SERPL-SCNC: 106 MEQ/L (ref 98–107)
CO2 SERPL-SCNC: 22 MEQ/L (ref 21–31)
CREAT SERPL-MCNC: 0.8 MG/DL (ref 0.6–1.2)
EGFRCR SERPLBLD CKD-EPI 2021: >60 ML/MIN/1.73M*2
ERYTHROCYTE [DISTWIDTH] IN BLOOD BY AUTOMATED COUNT: 18.4 % (ref 11.7–14.4)
GLUCOSE BLD-MCNC: 101 MG/DL (ref 70–99)
GLUCOSE BLD-MCNC: 154 MG/DL (ref 70–99)
GLUCOSE SERPL-MCNC: 74 MG/DL (ref 70–99)
HCT VFR BLD AUTO: 30.9 % (ref 35–45)
HGB BLD-MCNC: 9.7 G/DL (ref 11.8–15.7)
MCH RBC QN AUTO: 26.5 PG (ref 28–33.2)
MCHC RBC AUTO-ENTMCNC: 31.4 G/DL (ref 32.2–35.5)
MCV RBC AUTO: 84.4 FL (ref 83–98)
PDW BLD AUTO: 11.1 FL (ref 9.4–12.3)
PLATELET # BLD AUTO: 285 K/UL (ref 150–369)
POCT TEST: ABNORMAL
POCT TEST: ABNORMAL
POTASSIUM SERPL-SCNC: 3.7 MEQ/L (ref 3.5–5.1)
PROT SERPL-MCNC: 6.9 G/DL (ref 6–8.2)
RBC # BLD AUTO: 3.66 M/UL (ref 3.93–5.22)
SODIUM SERPL-SCNC: 136 MEQ/L (ref 136–145)
WBC # BLD AUTO: 11.15 K/UL (ref 3.8–10.5)

## 2024-03-10 PROCEDURE — 21400000 HC ROOM AND CARE CCU/INTERMEDIATE

## 2024-03-10 PROCEDURE — 63700000 HC SELF-ADMINISTRABLE DRUG

## 2024-03-10 PROCEDURE — 99233 SBSQ HOSP IP/OBS HIGH 50: CPT | Performed by: HOSPITALIST

## 2024-03-10 PROCEDURE — 63700000 HC SELF-ADMINISTRABLE DRUG: Performed by: STUDENT IN AN ORGANIZED HEALTH CARE EDUCATION/TRAINING PROGRAM

## 2024-03-10 PROCEDURE — 36415 COLL VENOUS BLD VENIPUNCTURE: CPT

## 2024-03-10 PROCEDURE — 80053 COMPREHEN METABOLIC PANEL: CPT

## 2024-03-10 PROCEDURE — 85027 COMPLETE CBC AUTOMATED: CPT

## 2024-03-10 RX ORDER — MIRTAZAPINE 7.5 MG/1
7.5 TABLET, FILM COATED ORAL NIGHTLY
Status: DISCONTINUED | OUTPATIENT
Start: 2024-03-10 | End: 2024-03-10

## 2024-03-10 RX ORDER — PREDNISONE 20 MG/1
20 TABLET ORAL DAILY
Qty: 8 TABLET | Refills: 0 | Status: SHIPPED | OUTPATIENT
Start: 2024-03-11 | End: 2024-03-11

## 2024-03-10 RX ORDER — MIRTAZAPINE 7.5 MG/1
7.5 TABLET, FILM COATED ORAL NIGHTLY
Status: DISCONTINUED | OUTPATIENT
Start: 2024-03-10 | End: 2024-03-16 | Stop reason: HOSPADM

## 2024-03-10 RX ORDER — COLCHICINE 0.6 MG/1
0.3 TABLET ORAL DAILY
Qty: 45 TABLET | Refills: 0 | Status: SHIPPED | OUTPATIENT
Start: 2024-03-10 | End: 2024-06-20

## 2024-03-10 RX ORDER — MIRTAZAPINE 7.5 MG/1
7.5 TABLET, FILM COATED ORAL NIGHTLY
Qty: 30 TABLET | Refills: 0 | Status: CANCELLED | OUTPATIENT
Start: 2024-03-10 | End: 2024-04-09

## 2024-03-10 RX ORDER — POTASSIUM CHLORIDE 750 MG/1
40 TABLET, EXTENDED RELEASE ORAL ONCE
Status: COMPLETED | OUTPATIENT
Start: 2024-03-10 | End: 2024-03-10

## 2024-03-10 RX ORDER — LOPERAMIDE HYDROCHLORIDE 2 MG/1
2 CAPSULE ORAL 3 TIMES DAILY PRN
Qty: 30 CAPSULE | Refills: 0 | Status: SHIPPED | OUTPATIENT
Start: 2024-03-10 | End: 2024-03-16 | Stop reason: HOSPADM

## 2024-03-10 RX ADMIN — COLCHICINE 0.3 MG: 0.6 TABLET ORAL at 09:57

## 2024-03-10 RX ADMIN — AMLODIPINE BESYLATE 10 MG: 10 TABLET ORAL at 09:57

## 2024-03-10 RX ADMIN — MUPIROCIN 1 APPLICATION: 20 OINTMENT TOPICAL at 10:00

## 2024-03-10 RX ADMIN — LIDOCAINE 1 PATCH: 4 PATCH TOPICAL at 09:54

## 2024-03-10 RX ADMIN — Medication 1000 UNITS: at 09:56

## 2024-03-10 RX ADMIN — MIRTAZAPINE 7.5 MG: 15 TABLET, FILM COATED ORAL at 15:15

## 2024-03-10 RX ADMIN — ACETAMINOPHEN 650 MG: 325 TABLET ORAL at 00:47

## 2024-03-10 RX ADMIN — ACETAMINOPHEN 650 MG: 325 TABLET ORAL at 09:55

## 2024-03-10 RX ADMIN — SILDENAFIL 20 MG: 20 TABLET, FILM COATED ORAL at 09:58

## 2024-03-10 RX ADMIN — Medication 100 MCG: at 09:56

## 2024-03-10 RX ADMIN — TIOTROPIUM BROMIDE INHALATION SPRAY 2 PUFF: 3.12 SPRAY, METERED RESPIRATORY (INHALATION) at 10:00

## 2024-03-10 RX ADMIN — MOMETASONE FUROATE AND FORMOTEROL FUMARATE DIHYDRATE 2 PUFF: 100; 5 AEROSOL RESPIRATORY (INHALATION) at 10:00

## 2024-03-10 RX ADMIN — LEVOTHYROXINE SODIUM 100 MCG: 0.1 TABLET ORAL at 05:40

## 2024-03-10 RX ADMIN — SILDENAFIL 20 MG: 20 TABLET, FILM COATED ORAL at 21:01

## 2024-03-10 RX ADMIN — POTASSIUM CHLORIDE 40 MEQ: 750 TABLET, EXTENDED RELEASE ORAL at 14:05

## 2024-03-10 RX ADMIN — ATORVASTATIN CALCIUM 20 MG: 20 TABLET, FILM COATED ORAL at 18:05

## 2024-03-10 RX ADMIN — MOMETASONE FUROATE AND FORMOTEROL FUMARATE DIHYDRATE 2 PUFF: 100; 5 AEROSOL RESPIRATORY (INHALATION) at 21:02

## 2024-03-10 RX ADMIN — SILDENAFIL 20 MG: 20 TABLET, FILM COATED ORAL at 14:05

## 2024-03-10 RX ADMIN — PREDNISONE 20 MG: 20 TABLET ORAL at 09:58

## 2024-03-10 ASSESSMENT — COGNITIVE AND FUNCTIONAL STATUS - GENERAL
CLIMB 3 TO 5 STEPS WITH RAILING: 3 - A LITTLE
STANDING UP FROM CHAIR USING ARMS: 3 - A LITTLE
CLIMB 3 TO 5 STEPS WITH RAILING: 3 - A LITTLE
STANDING UP FROM CHAIR USING ARMS: 3 - A LITTLE
WALKING IN HOSPITAL ROOM: 3 - A LITTLE
MOVING TO AND FROM BED TO CHAIR: 3 - A LITTLE
WALKING IN HOSPITAL ROOM: 3 - A LITTLE
MOVING TO AND FROM BED TO CHAIR: 3 - A LITTLE

## 2024-03-10 NOTE — PROGRESS NOTES
Internal Medicine  Daily Progress Note       SUBJECTIVE   This is a 63 y.o. year-old female admitted on 3/3/2024 with Pericardial effusion [I31.39]  Interstitial lung disease (CMS/HCC) [J84.9]  Hypoxia [R09.02].    Interval History: No acute events overnight.  Saw the patient at bedside this morning.  Endorses continued loose stools that are mildly improved from yesterday.  C. difficile testing was negative.  Persistent mid epigastric pain stable from yesterday.  No ongoing chest pain or palpitations.  Some discomfort in her left wrist feels like range of motion is reduced, x-ray pending.     OBJECTIVE   Vital signs in last 24 hours:  Temp:  [36.3 °C (97.3 °F)-36.6 °C (97.8 °F)] 36.4 °C (97.5 °F)  Heart Rate:  [60-80] 64  Resp:  [17-18] 18  BP: (114-147)/(57-71) 143/66  SpO2:  [95 %-98 %] 96 %  Oxygen Therapy: None (Room air)    Weight & I/Os:  Weights (last 5 days)     Date/Time Weight    03/10/24 0555 44.8 kg (98 lb 12.8 oz)    03/09/24 0608 42 kg (92 lb 9.5 oz)    03/08/24 0533 45.4 kg (100 lb 1.6 oz)    03/07/24 0700 46.1 kg (101 lb 11.2 oz)    03/06/24 0611 52.2 kg (115 lb 1.3 oz)    03/05/24 0615 45.9 kg (101 lb 3.1 oz)          Intake/Output Summary (Last 24 hours) at 3/10/2024 0659  Last data filed at 3/9/2024 2127  24 Hour Net Input/Output from 7AM Yesterday   Intake 130 ml   Output --   Net 130 ml        PHYSICAL EXAMINATION   Physical Exam     Neck:      Comments: JVD  Cardiovascular:      Rate and Rhythm: Normal rate and regular rhythm.      Pulses: Normal pulses.      Heart sounds: Normal heart sounds.   Pulmonary:      Effort: Pulmonary effort is normal.      Breath sounds: Normal breath sounds.   Abdominal:      Palpations: Abdomen is soft.   Musculoskeletal:      Right lower leg: No edema.      Left lower leg: No edema.   Neurological:      Mental Status: She is alert. Mental status is at baseline.          LINES, CATHETERS, DRAINS, AIRWAYS, & WOUNDS   Lines, drains, airways, & wounds:  Peripheral  IV (Adult) 03/03/24 Left Antecubital (Active)   Number of days: 7       Wound Other (comment) Anterior;Left Toe (Great) (Active)   Number of days: 6       Wound Pressure Injury Anterior;Left Ankle (Active)   Number of days: 6       Wound Pressure Injury Anterior;Right Ankle (Active)   Number of days: 6       Wound Other (comment) Anterior;Right Toe (2nd) (Active)   Number of days: 6        LABS, IMAGING, & TELE   Labs:  No new labs.    Results from last 7 days   Lab Units 03/10/24  0541 03/09/24  0406 03/08/24  1056   WBC K/uL 11.15* 13.17* 11.52*   HEMOGLOBIN g/dL 9.7* 10.2* 9.9*   HEMATOCRIT % 30.9* 33.7* 32.2*   PLATELETS K/uL 285 308 314       Results from last 7 days   Lab Units 03/10/24  0541 03/09/24  0406 03/08/24  1056   SODIUM mEQ/L 136 137 138   POTASSIUM mEQ/L 3.7 3.7 4.1   CHLORIDE mEQ/L 106 105 108*   CO2 mEQ/L 22 22 23   BUN mg/dL 21 27* 30*   CREATININE mg/dL 0.8 0.9 0.9   CALCIUM mg/dL 8.6 8.9 9.0   ALBUMIN g/dL 3.0* 3.4* 3.2*   BILIRUBIN TOTAL mg/dL 0.2* 0.3 0.2*   ALK PHOS IU/L 55 57 52   ALT IU/L 9 11 10   AST IU/L 11* 16 15   GLUCOSE mg/dL 74 58* 90     Imaging personally reviewed (does not include unread studies):  No results found.  Telemetry/EKG:  I have independently reviewed the telemetry. No events for the last 24 hours.     ASSESSMENT & PLAN   Hypothyroid  Assessment & Plan  Patient has a PMH of hypothyroidism 2/2 thyroidectomy for follicular carcinoma of the thyroid     Note H/o Total Thyroidectomy (Dr. Pacheco at [facility]; 4/2013)  - continue home thyroid medications Synthroid 100mcg qd    Acute on chronic heart failure with preserved ejection fraction (CMS/HCC)  Assessment & Plan  Patient has a PMH of HFPEF with EF 65%   Endorsing worsening HSOB, denies any ANGEL LUIS, UFD or medication complaince difficulty   CT shows pericardial effusion without pleural effusions or pulmonary edema   Exam negative for ANGEL LUIS, crackles     Impression: 62 yo lady with PMH of scleroderma and PE, phtn on mecitentan and  sildenafil pw worseing BOWSER and shob with new pericardial effusion on ct imaging, negative for pulmonary edema or other s/sxs of CHF exacerbation. Unlikely this is a CHF exacerbation.     - continue to monitor   - continue home medications     History of pulmonary embolism  Assessment & Plan  Patient has a PMH of phtn and PE tx w/ apixiban however cb epistaxis and hemoptysis   Stopped elequis in June 2023   pw worsening shob and new pericardial effusion   Continues to endorse hemoptysis and epistaxis worsening over last few days   Echo EF 60%, no regional wall motion abnormalities, grade II diastolic dysfunction    - scds for now for dvt ppx       Interstitial lung disease (CMS/HCC)  Assessment & Plan  Patient has a PMH of ILD, CTEPH and phtn on sildenafil, mecetentan, and scleroderma   Has been discussing Tocilizumab with rheumatology but hasn't started it yet   Echo: EF 60%, No regional wall abnormalities, Grade II diastolic dysfunction  Continue pain meds as able     Impression: confirmed ILD and precap phtn 2/2 scleroderma, pw worsening SHOB, cp, and new pericardial effusion     - continue home meds   - cont PTA amlodipine   - continue phtn meds as able   - macitanetan and sildenafil       Pulmonary hypertension (CMS/HCC)  Assessment & Plan  Patient has a pMH of pulmonary htn, scleroderma, CTEPh and precapillary pHTN   Sees Dr. Arvizu   Multiple echos and RHC and LHC   Endorses continued worsening SHOB since 10/2022 with BOWSER, palpitations, bendopnea   3/4: Echo: EF 60%, No regional wall abnormalities, Grade II diastolic dysfunction  Most recent echo 6/2023 showed EF 60%, mild TR, RVSP 55, increased RV systolic pressures compared to April   Had a PE in march 2023, was on elequis however had hemoptysis and it was discontinued   VQ in sept 2023 showed mod-high prob of PE disease and CTA PE showed no acute filling defect but enlargmeent of PA cw PHTN    recently started mecitentan aug/sept 2023 wo much subjective  change     Ddx: multifactorial phtn scleroderma ILD vs. CTEPH vs. Pericardial effusion vs. Combo thereof     Impression: 62yo lady with PMH of raynauds, phtn, cutaneous scleroderma, ILD, CTEPh pw worsening SHOB x 24 hours with new pericardial effusion seen on CT. Prior echos have shown increasing RVSP and worsening phtn, for which she is on sildenafil, mecitentan.     - continue sildenafil   - continue mecitentan   - cards on board   - closely monitor BP in ICU   - SCDs for DVT given hemoptysis            Open wound of right foot  Assessment & Plan  Patient has multiple wounds noted on her feet bilaterally   Wound images from admission in the media tab   Is on CCB at home   States wounds have difficulty healing  Also now with poor nutritional status iso scleroderma and ILD     Ddx: nonhealing ulcers 2/2 autoimmune disease vs. 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 Has chronic nonhealing ulcers to her feet, most likely secondary to her scleroderma and poor nutritional status     - cont CCB  - wound ostomy on board   - consider pain management   - nutrition consult     * Pericardial effusion  Assessment & Plan  Ddx: autoimmune vs. Myocardial injury vs. Bacterial infection vs. Viral vs. Malignant   Patient has a PMH of phtn, scleroderma, CTEPH   pw increasing chest pressure x 1 day, without radiation into arms  Endorses continued GERD sxs however no new NVD  S/p Methylprednisolone x1 in ED   CT shows pericardial effusion   Echo: EF 60%, No regional wall abnormalities, Grade II diastolic dysfunction  ; Trop 12.9   [id number] and CRP 8.9   MRSA Nares: Negative  TB panel negative 2/27   BCx: NGTD      - stated colchicine but dropping dose due to diarrhea to colchicine 0.3. cdiff pending  - start low dose prednisone 20mg daily (as indomethacin alternative, pt reports  allerges to ASA and Ibuprofen)   - coronary CTA showed moderate 2 vessel CAD. Recommend plavix and statin but patient refusing medications.   - CMV qual   - TB panel 2/27 negative   - coxsackie FU qual   - CBC daily  - CMP daily   - scds for dvt ppx given hemooptysis   - Allergist recommendations appreciated  - VS frequently          VTE Assessment: Padua    VTE Prophylaxis: Current anticoagulants:    •None      Code Status: Full Code  Estimated discharge date:      ATTENDING DOCUMENTATION  ALSO SEE ATTENDING ATTESTATION SECTION OF NOTE

## 2024-03-10 NOTE — PROGRESS NOTES
Spoke on the phone with patient's daughter Tiffanie. Provided medical updates and answered all questions. Patient is stable for discharge home this afternoon.     Wolf Bernard  PGY3 Internal Medicine  Pager [phone]

## 2024-03-10 NOTE — PROGRESS NOTES
Per dr Ortez at [facility], pt is medically cleared for discharge to home today with C - pt is current with [facility] and accepted via ecin- I updated [facility] in [place] [facility] [place].po plan discussed with pt's daughter Tiffanie [phone] - agreeable, no questions or concern. I informed ot PT's rec for DME including heelchair +cushion and transfer board -Tiffanie is agreeable to have them ordered and delivered to home; order placed via parachute - dr ortez aware pend signature. Address and contact # confirmed with Tiffanie. Dr Bernard requested for ambulance transport. I informed Tiffanie and she stated she will be in to transport pt. Pt has 24/7 HHA arrange by family; Tiffanie will stay and assist pt today and she will arrange HHA tomorrow. Tiffanie appreciated my update but has question about home meds - dr Cabral and dr Ortez aware to call her for medical questions.  No additional dc needs at this time.    addedum DDC summary faxed to [facility] at

## 2024-03-10 NOTE — PLAN OF CARE
Plan of Care Review  Plan of Care Reviewed With: patient  Progress: improving  Outcome Evaluation: Patient verbalized knowledge of discharge planning.  Problem: Adult Inpatient Plan of Care  Goal: Plan of Care Review  Outcome: Met  Flowsheets (Taken 3/10/2024 1521)  Progress: improving  Outcome Evaluation: Patient verbalized knowledge of discharge planning.  Plan of Care Reviewed With: patient

## 2024-03-11 ENCOUNTER — APPOINTMENT (INPATIENT)
Dept: RADIOLOGY | Facility: HOSPITAL | Age: 64
DRG: 315 | End: 2024-03-11
Payer: COMMERCIAL

## 2024-03-11 ENCOUNTER — TELEPHONE (OUTPATIENT)
Dept: CARDIOLOGY | Facility: CLINIC | Age: 64
End: 2024-03-11
Payer: COMMERCIAL

## 2024-03-11 LAB
ALBUMIN SERPL-MCNC: 3.3 G/DL (ref 3.5–5.7)
ALBUMIN SERPL-MCNC: 3.5 G/DL (ref 3.5–5.7)
ALP SERPL-CCNC: 55 IU/L (ref 34–125)
ALP SERPL-CCNC: 58 IU/L (ref 34–125)
ALT SERPL-CCNC: 10 IU/L (ref 7–52)
ALT SERPL-CCNC: 17 IU/L (ref 7–52)
ANION GAP SERPL CALC-SCNC: 8 MEQ/L (ref 3–15)
ANION GAP SERPL CALC-SCNC: 9 MEQ/L (ref 3–15)
APTT PPP: 26 SEC (ref 23–35)
AST SERPL-CCNC: 12 IU/L (ref 13–39)
AST SERPL-CCNC: 25 IU/L (ref 13–39)
BASOPHILS # BLD: 0.02 K/UL (ref 0.01–0.1)
BASOPHILS NFR BLD: 0.1 %
BILIRUB SERPL-MCNC: 0.3 MG/DL (ref 0.3–1.2)
BILIRUB SERPL-MCNC: 0.3 MG/DL (ref 0.3–1.2)
BUN SERPL-MCNC: 24 MG/DL (ref 7–25)
BUN SERPL-MCNC: 24 MG/DL (ref 7–25)
CALCIUM SERPL-MCNC: 9 MG/DL (ref 8.6–10.3)
CALCIUM SERPL-MCNC: 9.1 MG/DL (ref 8.6–10.3)
CHLORIDE SERPL-SCNC: 105 MEQ/L (ref 98–107)
CHLORIDE SERPL-SCNC: 109 MEQ/L (ref 98–107)
CO2 SERPL-SCNC: 21 MEQ/L (ref 21–31)
CO2 SERPL-SCNC: 23 MEQ/L (ref 21–31)
CREAT SERPL-MCNC: 0.9 MG/DL (ref 0.6–1.2)
CREAT SERPL-MCNC: 0.9 MG/DL (ref 0.6–1.2)
DIFFERENTIAL METHOD BLD: ABNORMAL
EGFRCR SERPLBLD CKD-EPI 2021: >60 ML/MIN/1.73M*2
EGFRCR SERPLBLD CKD-EPI 2021: >60 ML/MIN/1.73M*2
EOSINOPHIL # BLD: 0.03 K/UL (ref 0.04–0.36)
EOSINOPHIL NFR BLD: 0.2 %
ERYTHROCYTE [DISTWIDTH] IN BLOOD BY AUTOMATED COUNT: 18.8 % (ref 11.7–14.4)
ERYTHROCYTE [DISTWIDTH] IN BLOOD BY AUTOMATED COUNT: 19.4 % (ref 11.7–14.4)
GLUCOSE BLD-MCNC: 103 MG/DL (ref 70–99)
GLUCOSE BLD-MCNC: 118 MG/DL (ref 70–99)
GLUCOSE BLD-MCNC: 71 MG/DL (ref 70–99)
GLUCOSE SERPL-MCNC: 128 MG/DL (ref 70–99)
GLUCOSE SERPL-MCNC: 67 MG/DL (ref 70–99)
HCT VFR BLD AUTO: 35.4 % (ref 35–45)
HCT VFR BLD AUTO: 35.4 % (ref 35–45)
HGB BLD-MCNC: 10.6 G/DL (ref 11.8–15.7)
HGB BLD-MCNC: 10.9 G/DL (ref 11.8–15.7)
IMM GRANULOCYTES # BLD AUTO: 0.13 K/UL (ref 0–0.08)
IMM GRANULOCYTES NFR BLD AUTO: 0.8 %
INR PPP: 1
LYMPHOCYTES # BLD: 0.96 K/UL (ref 1.2–3.5)
LYMPHOCYTES NFR BLD: 6.1 %
MCH RBC QN AUTO: 26.1 PG (ref 28–33.2)
MCH RBC QN AUTO: 26.4 PG (ref 28–33.2)
MCHC RBC AUTO-ENTMCNC: 29.9 G/DL (ref 32.2–35.5)
MCHC RBC AUTO-ENTMCNC: 30.8 G/DL (ref 32.2–35.5)
MCV RBC AUTO: 85.7 FL (ref 83–98)
MCV RBC AUTO: 87.2 FL (ref 83–98)
MONOCYTES # BLD: 0.21 K/UL (ref 0.28–0.8)
MONOCYTES NFR BLD: 1.3 %
NEUTROPHILS # BLD: 14.29 K/UL (ref 1.7–7)
NEUTS SEG NFR BLD: 91.5 %
NRBC BLD-RTO: 0 %
PDW BLD AUTO: 11.4 FL (ref 9.4–12.3)
PDW BLD AUTO: 12.1 FL (ref 9.4–12.3)
PLATELET # BLD AUTO: 311 K/UL (ref 150–369)
PLATELET # BLD AUTO: 327 K/UL (ref 150–369)
POCT TEST: ABNORMAL
POCT TEST: ABNORMAL
POCT TEST: NORMAL
POTASSIUM SERPL-SCNC: 4.5 MEQ/L (ref 3.5–5.1)
POTASSIUM SERPL-SCNC: 5.4 MEQ/L (ref 3.5–5.1)
PROT SERPL-MCNC: 7.4 G/DL (ref 6–8.2)
PROT SERPL-MCNC: 8 G/DL (ref 6–8.2)
PROTHROMBIN TIME: 13.3 SEC (ref 12.2–14.5)
RBC # BLD AUTO: 4.06 M/UL (ref 3.93–5.22)
RBC # BLD AUTO: 4.13 M/UL (ref 3.93–5.22)
SODIUM SERPL-SCNC: 136 MEQ/L (ref 136–145)
SODIUM SERPL-SCNC: 139 MEQ/L (ref 136–145)
WBC # BLD AUTO: 10.69 K/UL (ref 3.8–10.5)
WBC # BLD AUTO: 15.64 K/UL (ref 3.8–10.5)

## 2024-03-11 PROCEDURE — 80053 COMPREHEN METABOLIC PANEL: CPT

## 2024-03-11 PROCEDURE — 25000000 HC PHARMACY GENERAL

## 2024-03-11 PROCEDURE — 63700000 HC SELF-ADMINISTRABLE DRUG

## 2024-03-11 PROCEDURE — 63700000 HC SELF-ADMINISTRABLE DRUG: Performed by: STUDENT IN AN ORGANIZED HEALTH CARE EDUCATION/TRAINING PROGRAM

## 2024-03-11 PROCEDURE — 36415 COLL VENOUS BLD VENIPUNCTURE: CPT

## 2024-03-11 PROCEDURE — 85610 PROTHROMBIN TIME: CPT

## 2024-03-11 PROCEDURE — 71250 CT THORAX DX C-: CPT

## 2024-03-11 PROCEDURE — 99233 SBSQ HOSP IP/OBS HIGH 50: CPT | Performed by: HOSPITALIST

## 2024-03-11 PROCEDURE — 21400000 HC ROOM AND CARE CCU/INTERMEDIATE

## 2024-03-11 PROCEDURE — 63600000 HC DRUGS/DETAIL CODE: Mod: JZ

## 2024-03-11 PROCEDURE — 99232 SBSQ HOSP IP/OBS MODERATE 35: CPT | Performed by: INTERNAL MEDICINE

## 2024-03-11 PROCEDURE — 85730 THROMBOPLASTIN TIME PARTIAL: CPT

## 2024-03-11 PROCEDURE — 74176 CT ABD & PELVIS W/O CONTRAST: CPT

## 2024-03-11 PROCEDURE — 71045 X-RAY EXAM CHEST 1 VIEW: CPT

## 2024-03-11 PROCEDURE — 85027 COMPLETE CBC AUTOMATED: CPT

## 2024-03-11 PROCEDURE — 85025 COMPLETE CBC W/AUTO DIFF WBC: CPT

## 2024-03-11 RX ORDER — PANTOPRAZOLE SODIUM 40 MG/10ML
40 INJECTION, POWDER, LYOPHILIZED, FOR SOLUTION INTRAVENOUS 2 TIMES DAILY
Status: DISCONTINUED | OUTPATIENT
Start: 2024-03-11 | End: 2024-03-13

## 2024-03-11 RX ORDER — PANTOPRAZOLE SODIUM 20 MG/1
20 TABLET, DELAYED RELEASE ORAL DAILY
Qty: 30 TABLET | Refills: 0 | Status: SHIPPED | OUTPATIENT
Start: 2024-03-11 | End: 2024-04-16 | Stop reason: ALTCHOICE

## 2024-03-11 RX ORDER — PREDNISONE 20 MG/1
20 TABLET ORAL DAILY
Qty: 8 TABLET | Refills: 0 | Status: SHIPPED | OUTPATIENT
Start: 2024-03-12 | End: 2024-03-16

## 2024-03-11 RX ADMIN — COLCHICINE 0.3 MG: 0.6 TABLET ORAL at 09:42

## 2024-03-11 RX ADMIN — MOMETASONE FUROATE AND FORMOTEROL FUMARATE DIHYDRATE 2 PUFF: 100; 5 AEROSOL RESPIRATORY (INHALATION) at 20:15

## 2024-03-11 RX ADMIN — MOMETASONE FUROATE AND FORMOTEROL FUMARATE DIHYDRATE 2 PUFF: 100; 5 AEROSOL RESPIRATORY (INHALATION) at 09:43

## 2024-03-11 RX ADMIN — LEVOTHYROXINE SODIUM 100 MCG: 0.1 TABLET ORAL at 05:39

## 2024-03-11 RX ADMIN — SILDENAFIL 20 MG: 20 TABLET, FILM COATED ORAL at 09:42

## 2024-03-11 RX ADMIN — MUPIROCIN 1 APPLICATION: 20 OINTMENT TOPICAL at 09:49

## 2024-03-11 RX ADMIN — AMLODIPINE BESYLATE 10 MG: 10 TABLET ORAL at 09:41

## 2024-03-11 RX ADMIN — Medication 100 MCG: at 09:41

## 2024-03-11 RX ADMIN — PANTOPRAZOLE SODIUM 40 MG: 40 INJECTION, POWDER, FOR SOLUTION INTRAVENOUS at 21:54

## 2024-03-11 RX ADMIN — ONDANSETRON 4 MG: 4 TABLET, ORALLY DISINTEGRATING ORAL at 16:21

## 2024-03-11 RX ADMIN — Medication 1000 UNITS: at 09:42

## 2024-03-11 RX ADMIN — PREDNISONE 20 MG: 20 TABLET ORAL at 09:42

## 2024-03-11 RX ADMIN — SILDENAFIL 20 MG: 20 TABLET, FILM COATED ORAL at 20:07

## 2024-03-11 RX ADMIN — SILDENAFIL 20 MG: 20 TABLET, FILM COATED ORAL at 13:23

## 2024-03-11 RX ADMIN — TIOTROPIUM BROMIDE INHALATION SPRAY 2 PUFF: 3.12 SPRAY, METERED RESPIRATORY (INHALATION) at 09:43

## 2024-03-11 RX ADMIN — ONDANSETRON 4 MG: 2 INJECTION INTRAMUSCULAR; INTRAVENOUS at 17:37

## 2024-03-11 RX ADMIN — ATORVASTATIN CALCIUM 20 MG: 20 TABLET, FILM COATED ORAL at 17:39

## 2024-03-11 RX ADMIN — MIRTAZAPINE 7.5 MG: 15 TABLET, FILM COATED ORAL at 21:54

## 2024-03-11 ASSESSMENT — COGNITIVE AND FUNCTIONAL STATUS - GENERAL
STANDING UP FROM CHAIR USING ARMS: 3 - A LITTLE
CLIMB 3 TO 5 STEPS WITH RAILING: 3 - A LITTLE
WALKING IN HOSPITAL ROOM: 3 - A LITTLE
MOVING TO AND FROM BED TO CHAIR: 3 - A LITTLE

## 2024-03-11 NOTE — PROGRESS NOTES
Internal Medicine  Daily Progress Note       SUBJECTIVE   This is a 63 y.o. year-old female admitted on 3/3/2024 with Pericardial effusion [I31.39]  Interstitial lung disease (CMS/HCC) [J84.9]  Hypoxia [R09.02].    Interval History:   Patient was comfortable overnight. She reports her diarrhea has improved and her pains have improved.   At around 6 pm on 3/11 patient developed nausea and vomiting along with abdominal pain. CT abdomen showed obstruction vs ileus and therefore patient was not discharged and remained in the hospital      OBJECTIVE   Vital signs in last 24 hours:  Temp:  [36.7 °C (98 °F)-36.8 °C (98.3 °F)] 36.7 °C (98 °F)  Heart Rate:  [] 70  Resp:  [17-18] 18  BP: (132-155)/(61-72) 134/61  SpO2:  [92 %-96 %] 94 %  Oxygen Therapy: None (Room air)    Weight & I/Os:  Weights (last 5 days)     Date/Time Weight    03/12/24 0611 46.4 kg (102 lb 4.8 oz)    03/11/24 0539 46.5 kg (102 lb 8.2 oz)    03/10/24 0555 44.8 kg (98 lb 12.8 oz)    03/09/24 0608 42 kg (92 lb 9.5 oz)    03/08/24 0533 45.4 kg (100 lb 1.6 oz)    03/07/24 0700 46.1 kg (101 lb 11.2 oz)          Intake/Output Summary (Last 24 hours) at 3/11/2024 0659  Last data filed at 3/10/2024 1700  24 Hour Net Input/Output from 7AM Yesterday   Intake 180 ml   Output 200 ml   Net -20 ml        PHYSICAL EXAMINATION   Physical Exam  Neck:      Comments: JVD  Cardiovascular:      Rate and Rhythm: Normal rate and regular rhythm.      Pulses: Normal pulses.      Heart sounds: Normal heart sounds.   Pulmonary:      Effort: Pulmonary effort is normal.      Breath sounds: Normal breath sounds.   Abdominal:      Comments: Abdominal tenderness    Musculoskeletal:      Right lower leg: No edema.      Left lower leg: No edema.   Neurological:      Mental Status: She is alert. Mental status is at baseline.          LINES, CATHETERS, DRAINS, AIRWAYS, & WOUNDS   Lines, drains, airways, & wounds:  Wound Other (comment) Anterior;Left Toe (Great) (Active)   Number  of days: 7       Wound Pressure Injury Anterior;Left Ankle (Active)   Number of days: 7       Wound Pressure Injury Anterior;Right Ankle (Active)   Number of days: 7       Wound Other (comment) Anterior;Right Toe (2nd) (Active)   Number of days: 7        LABS, IMAGING, & TELE   Labs:  No new labs.    Results from last 7 days   Lab Units 03/10/24  0541 03/09/24  0406 03/08/24  1056   WBC K/uL 11.15* 13.17* 11.52*   HEMOGLOBIN g/dL 9.7* 10.2* 9.9*   HEMATOCRIT % 30.9* 33.7* 32.2*   PLATELETS K/uL 285 308 314       Results from last 7 days   Lab Units 03/10/24  0541 03/09/24  0406 03/08/24  1056   SODIUM mEQ/L 136 137 138   POTASSIUM mEQ/L 3.7 3.7 4.1   CHLORIDE mEQ/L 106 105 108*   CO2 mEQ/L 22 22 23   BUN mg/dL 21 27* 30*   CREATININE mg/dL 0.8 0.9 0.9   CALCIUM mg/dL 8.6 8.9 9.0   ALBUMIN g/dL 3.0* 3.4* 3.2*   BILIRUBIN TOTAL mg/dL 0.2* 0.3 0.2*   ALK PHOS IU/L 55 57 52   ALT IU/L 9 11 10   AST IU/L 11* 16 15   GLUCOSE mg/dL 74 58* 90     Imaging personally reviewed (does not include unread studies):  No results found.       ASSESSMENT & PLAN   Nausea and vomiting  Assessment & Plan  Patient developed nausea and dark vomiting on 3/11 afternoon   CT abdomen showed dilated loops of bowel that could represent SBO vs ileus   Surgery team consulted and will assess     Interstitial lung disease (CMS/HCC)  Assessment & Plan  Patient has a PMH of ILD, CTEPH and phtn on sildenafil, mecetentan, and scleroderma   Has been discussing Tocilizumab with rheumatology but hasn't started it yet   Echo: EF 60%, No regional wall abnormalities, Grade II diastolic dysfunction  Continue pain meds as able     Impression: confirmed ILD and precap phtn 2/2 scleroderma, pw worsening SHOB, cp, and new pericardial effusion     - continue home meds   - cont PTA amlodipine   - continue phtn meds as able   - macitanetan and sildenafil       Pulmonary hypertension (CMS/HCC)  Assessment & Plan  Patient has a pMH of pulmonary htn, scleroderma, CTEPh  and precapillary pHTN   Sees Dr. Arvizu   Multiple echos and RHC and LHC   Endorses continued worsening SHOB since 10/2022 with BOWSER, palpitations, bendopnea   3/4: Echo: EF 60%, No regional wall abnormalities, Grade II diastolic dysfunction  Most recent echo 6/2023 showed EF 60%, mild TR, RVSP 55, increased RV systolic pressures compared to April   Had a PE in march 2023, was on elequis however had hemoptysis and it was discontinued   VQ in sept 2023 showed mod-high prob of PE disease and CTA PE showed no acute filling defect but enlargmeent of PA cw PHTN    recently started mecitentan aug/sept 2023 wo much subjective change     Ddx: multifactorial phtn scleroderma ILD vs. CTEPH vs. Pericardial effusion vs. Combo thereof     Impression: 64yo lady with PMH of raynauds, phtn, cutaneous scleroderma, ILD, CTEPh pw worsening SHOB x 24 hours with new pericardial effusion seen on CT. Prior echos have shown increasing RVSP and worsening phtn, for which she is on sildenafil, mecitentan.     - continue sildenafil   - continue mecitentan   - cards on board   - closely monitor BP in ICU   - SCDs for DVT given hemoptysis            * Pericardial effusion  Assessment & Plan  Ddx: autoimmune vs. Myocardial injury vs. Bacterial infection vs. Viral vs. Malignant   Patient has a PMH of phtn, scleroderma, CTEPH   pw increasing chest pressure x 1 day, without radiation into arms  Endorses continued GERD sxs however no new NVD  S/p Methylprednisolone x1 in ED   CT shows pericardial effusion   Echo: EF 60%, No regional wall abnormalities, Grade II diastolic dysfunction  ; Trop 12.9   [id number] and CRP 8.9   MRSA Nares: Negative  TB panel negative 2/27   BCx: NGTD      - stated colchicine but dropping dose due to diarrhea to colchicine 0.3. cdiff pending  - start low dose prednisone 20mg daily (as indomethacin alternative, pt reports allerges to ASA and Ibuprofen)   - coronary CTA showed moderate 2 vessel CAD. Recommend plavix and  statin but patient refusing medications.   - CMV qual   - TB panel 2/27 negative   - coxsackie FU qual   - CBC daily  - CMP daily   - scds for dvt ppx given hemooptysis   - Allergist recommendations appreciated  - VS frequently       Hypothyroid  Assessment & Plan  Patient has a PMH of hypothyroidism 2/2 thyroidectomy for follicular carcinoma of the thyroid     Note H/o [facility]roidectomy ([facility]; 4/2013)  - continue home thyroid medications Synthroid 100mcg qd    Acute on chronic heart failure with preserved ejection fraction (CMS/HCC)  Assessment & Plan  Patient has a PMH of HFPEF with EF 65%   Endorsing worsening HSOB, denies any ANGEL LUIS, UFD or medication complaince difficulty   CT shows pericardial effusion without pleural effusions or pulmonary edema   Exam negative for ANGEL LUIS, crackles     Impression: 62 yo lady with PMH of scleroderma and PE, phtn on mecitentan and sildenafil pw worseing BOWSER and shob with new pericardial effusion on ct imaging, negative for pulmonary edema or other s/sxs of CHF exacerbation. Unlikely this is a CHF exacerbation.     - continue to monitor   - continue home medications     History of pulmonary embolism  Assessment & Plan  Patient has a PMH of phtn and PE tx w/ apixiban however cb epistaxis and hemoptysis   Stopped elequis in June 2023   pw worsening shob and new pericardial effusion   Continues to endorse hemoptysis and epistaxis worsening over last few days   Echo EF 60%, no regional wall motion abnormalities, grade II diastolic dysfunction    - scds for now for dvt ppx       Open wound of right foot  Assessment & Plan  Patient has multiple wounds noted on her feet bilaterally   Wound images from admission in the media tab   Is on CCB at home   States wounds have difficulty healing  Also now with poor nutritional status iso scleroderma and ILD     Ddx: nonhealing ulcers 2/2 autoimmune disease vs. Poor nutritional status     Impression: 62yo lady with scleroderma causing  phtn, ild, chronic extremity nonhealing ulcers sp mutliple amputations, raynauds pw shob and new pericardial effusion. Has chronic nonhealing ulcers to her feet, most likely secondary to her scleroderma and poor nutritional status     - cont CCB  - wound ostomy on board   - consider pain management   - nutrition consult        VTE Assessment: Padua    VTE Prophylaxis: Current anticoagulants:    •None      Code Status: Full Code  Estimated discharge date: 3/10/2024         ATTENDING DOCUMENTATION  ALSO SEE ATTENDING ATTESTATION SECTION OF NOTE            '

## 2024-03-11 NOTE — TELEPHONE ENCOUNTER
Hi -     Can you please schedule hospital follow-up appointment? Discharge planned for today.     Thanks

## 2024-03-11 NOTE — PROGRESS NOTES
Heart Failure / Pulmonary Hypertension Progress Note    Interval History: Seen and examined sitting up in bed. She reports resolved diarrhea but now constipated. She reports on-going chest pain, palpitations, lightheadedness, dyspnea head and stomach pains.     Consult Background:   Ms. Felix is a 63 y.o. female with a past medical history of Pulmonary HTN, HTN, Raynaud's Disease, Cutaneous Systemic Scleroderma (dx 1998), ILD, PE (3/2023). She is a patient of Dr. Nguyễn. She was previously followed by Dr. Jos Valdez at [facility]. Echocardiogram from 4/21/2022 showed LVEF: 55-60%, mild aortic valve thickening, pulmonary artery systolic pressure: 52 mmHg and trivial pericardial effusion. LHC and RHC (separate occasions) over the last 5-10 years which showed normal hemodynamics and normal coronaries. She had a RHC 9/02/2020 showing top-normal right sided filling pressures with mild pulmonary hypertension, top-normal PVR and normal pulmonary capillary wedge pressure. The cardiac index was normal, seen below.     On 6/27/2022, [place] [facility] [place] EKG: NSR without ischemic changes. hsTrop: 12->16, d-dimer: 0.56, CXR showed cardiomegaly and diffuse interstitial prominence.     Dr. Nguyễn ordered echocardiogram, which was completed on 8/22/2022 and showed estimated RVSP = 50-55 mmHg, normal-sized LV with normal LV systolic function. Estimated EF 55- 60%. Wall motion grossly normal, mild concentric left ventricular hypertrophy, grade I LV diastolic dysfunction, probably normal RV size and function.    At her initial visit on 10/11/2022, she reported that she felt very short of breath with minimal activity most of the time. She reports that this started about 1 year prior and had progressively gotten worse. She described PND and bendopnea. She reported that she experienced ankle swelling, worsened over the past year and usually worse towards the end of the night. She also reported some swelling in her  abdomen. She reported daily palpations. I recommended a RHC which was done 11/28/2022 and showed: Normal right sided filling pressures. Mildly elevated left sided filling pressures. Precapillary pulmonary hypertension with mean PA 36 mmHg.    She was hospitalized on 3/19/2023 at [facility] for PE diagnosed on V/Q scan. She reports that she had presented with left arm pain and heaviness. She states that she was started on Apixaban. One week after her last office visit (4/6/2023), she presented to [facility] with chest pain, epistaxis and hemoptysis. Echocardiogram showed: left ventricular cavity size normal with mild left ventricular hypertrophy and preserved systolic function. Estimated EF 65%. Chest CTA showed no evidence of PE; Apixaban was discontinued.     She had a repeat echocardiogram (6/2023) which showed: Normal left ventricular cavity size and mild concentric left ventricular hypertrophy. Normal systolic function. EF: 60%. Normal right ventricular structure and function. Mild tricuspid valve regurgitation with estimated RVSP: 55 mmHg. Compared to previous study from 4/14/2023, estimated right ventricular systolic pressure were higher.    At her last office visit on 7/25/2023, she reported that she was not sure if she received/started the Macitentan. She stated at the time we initiated it, her Pulmonologist also started her on a new medication (Mycophenolate), and she had significant GI intolerance with this. It was trialed at various doses but ultimately stopped. At that visit, I asked her to check if she had Macitentan and if she did to start it and monitor her SpO2.    She underwent V/Q scan in September 2023, which showed intermediate-to-high probability of pulmonary embolic disease, as a result she completed CTA Chest PE which showed: no evidence for filling defect or vessel cutoff to suggest pulmonary embolism. Enlargement of the pulmonary arteries compatible with pulmonary arterial  "hypertension was seen.    She started Macitentan around August or September 2023.    She was last seen in the office in February 2024.     [place] [facility] [place] with chest pressure, palpitations, nausea. She was noted to have hypoxia and tachypnea at presentation. Given moderate to large pericardial effusion, she was monitored in CCU and transferred out on 3/6. hsTroponin was not elevated. She remained hemodynamically stable while in CCU. She has been started on Colchicine and steroids without relief of multi-system pain. She is planned for CCTA this AM. Hypoxia and tachypnea have resolved.     ---    Rheumatology: [facility] ([facility])     Past Medical / Surgical History:  Pulmonary HTN, HTN, Raynaud's Disease, Cutaneous Systemic Scleroderma (dx 1998), ILD, PE (3/2023), Follicular Carcinoma of Thyroid, Tenosynovitis, de Quervain, h/o Hernia Repair, h/o Appendicitis, h/o Digit Amputation, H/o Total Thyroidectomy (Dr. Pacheco at [facility]; 4/2013)    Family & Social History: She is , she has two children. She works as an .     Tobacco: former 2005  EtOH: never   Illicits: never     Her brother has CAD with stent  1st cousin with SLE  Both parents had \"heart problems\"    Allergies:   Allergies   Allergen Reactions   • Aspirin Shortness of breath     Other reaction(s): Unknown  \"stop breathing\"     • Ibuprofen Shortness of breath     Stop breathing   • Iodine Shortness of breath     Other reaction(s): Unknown   • Iodine And Iodide Containing Products Shortness of breath     ? rash   • Penicillins Shortness of breath     Other reaction(s): Unknown   • Sulfa (Sulfonamide Antibiotics) Angioedema   • Clindamycin    • Hydrochlorothiazide      Other reaction(s): Abdominal Pain   Slow heart rate   • Oxycodone      Other reaction(s): Vomiting   • Sulfamethoxazole-Trimethoprim      Other reaction(s): Vomiting, Weakness    • Latex Rash       Medications:   Current Facility-Administered Medications   Medication Dose Route " Frequency Provider Last Rate Last Admin   • acetaminophen (TYLENOL) tablet 650 mg  650 mg oral q4h PRN Brady Kim MD   650 mg at 03/10/24 0955   • amLODIPine (NORVASC) tablet 10 mg  10 mg oral q AM Patricia Haley DO   10 mg at 03/10/24 0957   • [Provider Managed Hold] amLODIPine (NORVASC) tablet 5-10 mg  5-10 mg oral Nightly PRN Dee Arellano DO       • atorvastatin (LIPITOR) tablet 20 mg  20 mg oral Daily (6p) Wolf Bernard MD   20 mg at 03/10/24 1805   • cholecalciferol (vitamin D3) tablet 1,000 Units  1,000 Units oral Daily Dee Arellano DO   1,000 Units at 03/10/24 0956   • clopidogreL (PLAVIX) tablet 75 mg  75 mg oral Daily Wolf Bernard MD       • colchicine (COLCRYS) tablet 0.3 mg  0.3 mg oral Daily Octavio Giordano MD   0.3 mg at 03/10/24 0957   • cyanocobalamin (VITAMIN B12) tablet 100 mcg  100 mcg oral Daily Dee Arellano DO   100 mcg at 03/10/24 0956   • glucose chewable tablet 16-32 g of dextrose  16-32 g of dextrose oral PRN Dee Arellano DO        Or   • dextrose 40 % oral gel 15-30 g of dextrose  15-30 g of dextrose oral PRN Dee Arellano DO        Or   • glucagon (GLUCAGEN) injection 1 mg  1 mg intramuscular PRN Dee Arellano DO        Or   • dextrose 50 % in water (D50) injection 12.5 g  25 mL intravenous PRN Dee Arellano DO       • insulin lispro U-100 (HumaLOG) pen 3-5 Units  3-5 Units subcutaneous With meals & nightly Wolf Bernard MD       • levothyroxine (SYNTHROID) tablet 100 mcg  100 mcg oral Daily Dee Arellano DO   100 mcg at 03/11/24 0539   • lidocaine (ASPERCREME) 4 % topical patch 1 patch  1 patch Topical Daily Sherita Panchal MD   1 patch at 03/10/24 0954   • loperamide (IMODIUM) capsule 2 mg  2 mg oral 3x daily PRN Wolf Bernard MD   2 mg at 03/08/24 2051   • meclizine (ANTIVERT) chewable tablet 25 mg   25 mg oral 3x daily PRN Wagner Chen MD   25 mg at 03/05/24 1817   • metoprolol (LOPRESSOR) injection 5 mg  5 mg intravenous PRN Timothy Arvizu DO   5 mg at 03/07/24 1215   • metoprolol tartrate (LOPRESSOR) tablet 50 mg  50 mg oral q30 min PRN Timothy Arvizu DO   50 mg at 03/07/24 1000   • mirtazapine (REMERON) tablet 7.5 mg  7.5 mg oral Nightly Wolf Bernard MD   7.5 mg at 03/10/24 1515   • mometasone-formoterol (DULERA 100) 100-5 mcg/actuation inhaler 2 puff  2 puff inhalation BID (8a, 8p) Dee Arellano DO   2 puff at 03/10/24 2102    And   • tiotropium bromide (SPIRIVA RESPIMAT) 2.5 mcg/actuation inhaler 2 puff  2 puff inhalation Daily (8a) Dee Arellano DO   2 puff at 03/10/24 1000   • mupirocin (BACTROBAN) 2 % ointment 1 Application  1 Application Topical Daily Dee Arellano DO   1 Application at 03/10/24 1000   • ondansetron ODT (ZOFRAN-ODT) disintegrating tablet 4 mg  4 mg oral q8h PRN Dee Arellano DO        Or   • ondansetron (ZOFRAN) injection 4 mg  4 mg intravenous q8h PRN Dee Arellano DO   4 mg at 03/06/24 1235   • predniSONE (DELTASONE) tablet 20 mg  20 mg oral Daily Patricia Haley DO   20 mg at 03/10/24 0958   • Pt Own- Macitentan (OPSUMIT) tablet 10mg  10 mg oral Daily Dee Arellano DO   10 mg at 03/10/24 0959   • sildenafiL (pulm.hypertension) (REVATIO) tablet 20 mg  20 mg oral TID Octavio Giordano MD   20 mg at 03/10/24 2101       Review of Systems:   All other systems reviewed and are negative except as per HPI.    Physical Exam:     Wt Readings from Last 10 Encounters:   03/11/24 46.5 kg (102 lb 8.2 oz)   02/01/24 47.6 kg (105 lb)   07/25/23 50.8 kg (112 lb)   07/25/23 50.8 kg (112 lb)   06/21/23 51.3 kg (113 lb)   04/19/23 51.7 kg (113 lb 14.4 oz)   04/06/23 52.6 kg (116 lb)   01/18/23 54.4 kg (120 lb)   11/28/22 55.3 kg (122 lb)   10/07/22 56.2 kg (124 lb)       BP Readings from Last 5  Encounters:   03/11/24 (!) 154/66   02/01/24 120/70   07/25/23 130/80   07/25/23 134/80   06/21/23 132/85       Vitals:    03/11/24 0803   BP: (!) 154/66   Pulse: 71   Resp: 18   Temp: 36.3 °C (97.3 °F)   SpO2: 99%       Body mass index is 16.55 kg/m².  Body surface area is 1.47 meters squared.    Constitutional: NAD, AAOx3  HENT: Normocephalic, atraumatic head, sclerae anicteric, no cervical lymphadenopathy, trachea midline  Cardiovascular: RRR, no murmurs, rubs or gallops, JVP: 7-8 cm H2O   cm H2O, no carotid bruits.  Respiratory: CTA bilateral lung fields, no wheezes, rales or rhonchi  GI: soft, non-tender/non-distended  Musculoskeletal / Extremities: no peripheral edema, +2 pulses bilateral radial, DP/PT arteries, skin tightening on face and fingertips  Skin: no rashes  Neuro / Psych: no focal deficits, CNII-XII grossly intact, appropriate, cooperative    Diagnostic Data:     Results from last 7 days   Lab Units 03/11/24  0628 03/10/24  0541 03/09/24  0406   SODIUM mEQ/L 139 136 137   POTASSIUM mEQ/L 4.5 3.7 3.7   CHLORIDE mEQ/L 109* 106 105   CO2 mEQ/L 21 22 22   BUN mg/dL 24 21 27*   CREATININE mg/dL 0.9 0.8 0.9   CALCIUM mg/dL 9.0 8.6 8.9   ALBUMIN g/dL 3.3* 3.0* 3.4*   BILIRUBIN TOTAL mg/dL 0.3 0.2* 0.3   ALK PHOS IU/L 55 55 57   ALT IU/L 10 9 11   AST IU/L 12* 11* 16   GLUCOSE mg/dL 67* 74 58*     Results from last 7 days   Lab Units 03/11/24  0629 03/10/24  0541 03/09/24  0406   WBC K/uL 10.69* 11.15* 13.17*   HEMOGLOBIN g/dL 10.6* 9.7* 10.2*   HEMATOCRIT % 35.4 30.9* 33.7*   MCV fL 87.2 84.4 86.0   PLATELETS K/uL 311 285 308     Component      Latest Ref Rng 4/13/2023 2/27/2024 3/3/2024   BNP      <=100 pg/mL 105 (H)  82  119 (H)       Component      Latest Ref Rng 3/3/2024   High Sens Troponin I      <15.0 pg/mL 12.6    High Sens Troponin I       12.2      Component      Latest Ref Rng 3/4/2024   CRP      <7.00 mg/L 8.90 (H)       Component      Latest Ref Rng 3/4/2024   Sed Rate      0 - 30 mm/hr 119  (H)       Component      Latest Ref [facility] 3/3/2024   D-Dimer, Quant      0.00 - 0.50 ug/mL FEU 0.68 (H)       Component      Latest Ref [facility] 4/18/2023 9/27/2023   Sodium      134 - 144 mmol/L 139  140    Potassium, Bld      3.5 - 5.2 mmol/L 4.4  4.1    Chloride      96 - 106 mmol/L 108  107 (H)    CO2      20 - 29 mmol/L 23  20    BUN      8 - 27 mg/dL 26 (H)  18    Creatinine      0.57 - 1.00 mg/dL 1.1  0.96    Glucose      70 - 99 mg/dL 82  83    Calcium      8.7 - 10.3 mg/dL 9.1  9.5    eGFR      >59 mL/min/1.73 >60.0  67      Component      Latest [facility] 4/13/2023   Hemoglobin A1C      <5.7 % 4.4      Component      Latest [facility] 4/13/2023 4/18/2023   WBC      3.80 - 10.50 K/uL 7.12  6.95    Hemoglobin      11.8 - 15.7 g/dL 10.6 (L)  10.1 (L)    Hematocrit      35.0 - 45.0 % 34.3 (L)  33.0 (L)    MCV      83.0 - 98.0 fL 87.7  90.4    Platelets      150 - 369 K/uL 267  256        Component      Latest [facility] 4/14/2023   Triglycerides      30 - 149 mg/dL 44    Cholesterol      <=200 mg/dL 194    HDL      >=55 mg/dL 49 (L)    LDL Calculated      <=100 mg/dL 136 (H)    Non-HDL, Calculated      mg/dL 145    RISK      <=5.0  4.0       Component      Latest [facility] 4/13/2023   High Sens Troponin I      <15.0 pg/mL 23.0 (H)       Component      Latest Ref [facility] 4/14/2023   Ferritin      11 - 250 ng/mL 75      Component      Latest Ref [facility] 4/13/2023   TSH      0.34 - 5.60 mIU/L 4.71      Component      Latest [facility] 4/14/2023   Iron      35 - 150 ug/dL 29 (L)    TIBC      270 - 460 ug/dL 245 (L)    UIBC      180 - 360 ug/dL 216    Iron Saturation      15 - 45 % 12 (L)        Component      Latest Ref [facility] 6/27/2022 4/13/2023   BNP      <=100 pg/mL 111 (H)  105 (H)                                     ---    ECG (3/3/2024, personally reviewed):   Sinus tachycardia   Nonspecific ST and T wave abnormality   HR: 129 bpm     ---    TTE (3/4/2024, personally reviewed):   • Normal-sized left ventricle. Mild left ventricular  hypertrophy. Preserved systolic function. LVEF 60%. No regional wall motion abnormalities. Grade II diastolic dysfunction.  Normal global longitudinal strain (-20%).  • The aortic valve is not well seen.  Cannot determine the number of cusps.  Sclerotic leaflets.  No aortic stenosis.  Trace aortic insufficiency.  • Normal sized aortic root.  The visualized portion of the ascending aorta is normal in size.  • The mitral valve leaflets are thickened. Mild mitral annular calcification. Trace mitral regurgitation.   • Mildly dilated left atrium.  • Normal-sized right ventricle. Normal right ventricular systolic function.  • Thickened tricuspid valve. There is mild tricuspid regurgitation with estimated RVSP of 46 mmHg.   • Pulmonic valve is not well visualized. Trace pulmonic regurgitation.   • Mildly dilated right atrium.  • IVC is enlarged with <50% respiratory variation consistent with elevated right atrial filling pressure (at least 15 mmHg).   • Moderate, circumferential, circumferential pericardial effusion.  The largest pocket is located inferolaterally.  Elevated right atrial pressure.  No other clear echocardiographic features of increased pericardial pressure.  Correlate clinically.   • Compared to previous TTE from 6/21/2023, pericardial effusion now moderate in size.  Right atrial pressure now elevated.       CTA Chest (3/3/2024):   IMPRESSION:  1.  No evidence of acute pulmonary embolism to the level of the segmental  branches.  2.  Moderate to large pericardial effusion measuring higher than simple fluid  density.  3.  Lower lobe lung field predominant interstitial thickening with some relative  subpleural sparing, consistent with a chronic interstitial lung disease,  unchanged from the prior study.  4.  Continued bilateral axillary, mediastinal, and bilateral hilar  lymphadenopathy, not definitely changed from the prior study, possibly related  to the patient's sarcoid.  5.  Emphysema.     CTA Chest PE  (10/4/2023):  IMPRESSION:  1. No evidence for filling defect or vessel cutoff to suggest pulmonary  embolism.  Enlargement of the pulmonary arteries compatible with pulmonary  arterial hypertension.  2. Changes throughout the lungs as described above which can be seen with  systemic sclerosis/scleroderma.  Underlying pneumonia the lung bases cannot be  entirely excluded in the proper clinical setting.  3.  Additional stable findings as described above.        VQ (9/12/2023):  IMPRESSION:  Intermediate to high probability of pulmonary embolic disease.     6MWT (7/25/2023, on Sildenafil 20 mg TID):        TTE (6/21/2023, personally reviewed):  1. Normal left ventricular cavity size and mild concentric left ventricular hypertrophy. Normal systolic function. EF: 60%. No regional wall motion abnormalities. Grade I diastolic dysfunction.   2. Aortic valve cusps not well visualized. Trace aortic regurgitation. Aortic valve and root sclerosis.  3. Thickened mitral valve leaflets. Mild mitral annular calcification. Trace mitral regurgitation. Mildly dilated left atrial size.   4. Normal right ventricular structure and function. Mild tricuspid valve regurgitation with estimated RVSP: 55 mmHg (assuming right atrial pressure of 3 mmHg). Normal right atrial size. Pulmonic valve not well visualized. Trace pulmonic valve regurgitation. Pulsed wave Doppler of the RVOT systolic profile show decreased acceleration times ( msec) and late systolic notching consistent with elevated PVR.   5. IVC size is normal with > 50% inspiratory collapse consistent with normal right atrial pressure. Small pericardial effusion without echocardiographic evidence of tamponade.  6. Compared to previous study from 4/14/2023, estimated right ventricular systolic pressure is higher.        TTE (4/14/2023, personally reviewed):   • The left ventricular cavity size is normal with mild left ventricular hypertrophy and preserved systolic function.  Estimated EF 65%. No regional wall motion abnormalities. Grade I diastolic dysfunction.     • The aortic valve is tricuspid. Aortic valve sclerosis. Trace aortic regurgitation.      • The visualized portions of the aortic root and ascending aorta are normal in size.     • The mitral valve is mildly thickened. Mild mitral annular calcification. Trace mitral regurgitation.       • The left atrium is severely dilated.      • The right ventricle is normal in size with preserved systolic function     • The pulmonic valve is not well seen.     • The tricuspid valve is normal in appearance. mild tricuspid regurgitation with an estimated RVSP of 34 mmHg.       • Right atrium is normal in size.     • The IVC is small and collapses > 50% with inspiration consistent with normal right atrial pressures.     • Small pericardial effusion, mostly posterior.       • Compared to previous echocardiogram from 8/22/2022, there is no significant change        TTE (11/28/2022, personally reviewed):   • Normal right- and mildly elevated left-sided filling pressures (RA: 3 mmHg, PCWP: 13 mmHg)  • Elevated pulmonary artery pressures (60/22(35 mmHg)) in the setting of PCWP: 13 mmHg.   • Normal cardiac output and index (CO: 5.1 L/min, CI: 3.2 L/min/m2)  • PVR: 4.3 Wood units. SVR: 1840 dynes/sec/cm5      TTE (8/22/2022, personally reviewed):  · Normal-sized LV. Normal LV systolic function. Estimated EF 55- 60%. Wall motion appears grossly normal. Mild concentric left ventricular hypertrophy. Grade I LV diastolic dysfunction.  · Pulmonic valve not well visualized. Grossly normal pulmonic valve structure. Trace pulmonic valve regurgitation. No pulmonic valve stenosis.  · Aortic valve not well visualized. Aortic valve not well seen but likely trileaflet. Focal aortic valve calcification present. Sclerotic aortic valve leaflets. Mild aortic valve regurgitation. No aortic valve stenosis.  · RV not well visualized. Normal-sized RV. Normal RV  systolic function.  · Normal-sized RA.  · There is a small loculated pericardial effusion anterior to the right atrium. No cardiac tamponade.  · Sclerotic mitral valve. Mitral valve calcification of the anterior leaflet present.  · Trace mitral valve regurgitation.  · No significant mitral valve stenosis.  · Normal-sized IVC. IVC demonstrates normal respiratory collapse.  · Tricuspid valve structure is grossly normal. Mild to moderate tricuspid valve regurgitation.  · Estimated RVSP = 50-55 mmHg.  · Mildly dilated LA.  · No previous echocardiogram available for comparison.     TTE (4/21/2022, outside study):  This result has an attachment that is not available.   •  A complete transthoracic echocardiogram (including 2D, color flow   Doppler, spectral Doppler and M-mode imaging) was performed using the   standard protocol. The study quality was adequate.   •  The left ventricle is normal in size. There is moderately increased   wall thickness consistent with moderate concentric hypertrophy.   Endocardium is incompletely visualized, but no regional wall motion   abnormalities are noted in the visualized segments. Normal left   ventricular ejection fraction. The left ventricular ejection fraction by   visual estimate is 55% to 60%.   •  The right ventricle is normal in size. There is normal function of the   right ventricle.   •  The left atrium is normal in size. Left atrial volume is 58 mL.   •  The right atrial volume measures 52 mL. The right atrial volume index   measures 34 mL/m2.   •  There is trace mitral regurgitation. There is no mitral stenosis.   •  The aortic valve is trileaflet. There is mild aortic valve thickening.   There is no aortic stenosis. There is trace aortic regurgitation.   •  There is mild to moderate tricuspid regurgitation. Pulmonary artery   systolic pressure measures 52 mmHg. The pulmonary artery systolic pressure   is moderately elevated.   •  The aorta measures 3.2 cm at the sinus of  Valsalva. The proximal   segment of the ascending aorta is mildly enlarged. The proximal segment of   the ascending aorta measures 3.4 cm. The proximal segment of the ascending   aorta measures 2.2 cm/m2, indexed for body surface area.   •  Trivial pericardial effusion present. There is no echocardiographic   evidence of cardiac tamponade.   •  The inferior vena cava is normal in size (diameter <21 mm) and   decreases >50% in size with inspiration, suggesting a normal right atrial   pressure of 3 mmHg (range 0-5 mm Hg).   •  Compared to the prior study of 3/13/2020, there are no significant   changes.        PFT (3/23/2022):      PFT (7/14/2021):      CT Chest (5/2/2021, outside study):  CLINICAL INFORMATION: Systemic sclerosis. Interstitial lung disease. Groundglass nodule     PROCEDURE: Unenhanced CT of the chest was performed using thin section reconstructions. Images were obtained on inspiration and expiration.     COMPARISON: June and August 2020     FINDINGS:     LUNGS, PLEURA:     Groundglass opacities with reticulation with subpleural sparing in the lower lobes, without honeycombing or architectural distortion, unchanged.     Right upper lobe groundglass nodule, image 114 of series 3, 8 x 11 mm, previously 6 mm.     Expiratory images are of diagnostic quality and do not demonstrate evidence of clinically significant air trapping.     CARDIOVASCULAR, MEDIASTINUM, THYROID: Coronary calcifications. Trace pericardial effusion.     LYMPH NODES: Subcentimeter mediastinal lymph nodes, unchanged. Unchanged axillary lymph nodes.     SKELETON, CHEST WALL: No destructive bone lesion     UPPER ABDOMEN: Patulous esophagus. 3 mm right renal stone.       RHC (9/02/2020):  RA   8 mmHg   RV   40/5 mmHg   PA (mean)  44/16 (25) mmHg   PCWP   12 mmHg   CO   4.65 lpm (Thermodilution)   CI   2.65 lpm/m-squared   SVI    33 ml/beat/m-squared (lower limit normal 29)   PVR   2.8 mmHg/l/min (Wood units)   SVR   2064 dyne-sec-cm-5          PFT (3/18/2020):      CCTA (3/7/2024, personally reviewed):      LEFT MAIN: Patent.     LAD: Proximal: Calcified plaque with 30 to 50% stenosis.  Mid: Mixed plaque with  50 to 70% stenosis, CT FFR 0.89.  Distal: Patent.  D1: Patent  D2: Patent, CT FFR 0.88     LCX: The circumflex and 1 marginal branch are patent with normal CT FFR.     RCA: Proximal: Calcified plaque with minimal stenosis.  Otherwise the RCA, PDA,  and PLB are patent with normal CT FFR.     CORONARY CALCIUM COMPOSITE AGATSTON SCORE: 313  LM: LAD: 312    LCX: RCA: 1     This places the patient in the HICKS 96th risk percentile for age and gender  matched individuals.     VENTRICULAR FUNCTION AND WALL MOTION     LV EJECTION FRACTION: 66%  LV WALL MOTION: Normal    SUMMARY:  1. Two-vessel coronary artery disease as above that is moderate in severity.  CT  FFR is normal..  2. There is mitral annular calcification and aortic sclerosis.  The right atrium  is enlarged.  There is left ventricular hypertrophy.  There is a moderate to  large circumferential pericardial effusion.  3. Normal left ventricular wall motion and systolic function.  4. Coronary calcium score 313, 96th percentile         TTE (3/13/2020, outside study):  · The study quality was good.   · The left ventricle is normal in size. Top normal left ventricular wall   thickness. There are no segmental wall motion abnormalities. The left   ventricular ejection fraction is 55-60% by visual estimate and 3D   echocardiography. Normal left ventricular ejection fraction.   · There is grade I (mild) LV diastolic dysfunction.   · The right ventricle is normal in size. There is normal function of the   right ventricle.   · The right atrium is mildly dilated.   · There is mild mitral valve anterior leaflet thickening. There is mild   mitral valve leaflet calcification. There is flattening of the mitral   leaflets without raphael prolapse. There is trace mitral regurgitation.   · The aortic valve is  trileaflet. There is mild thickening of the aortic   valve. There is mild calcification of the aortic valve. There is no aortic   /stenosis. There is trace aortic regurgitation.   · There is mild tricuspid valve leaflet thickening. There is mild to   moderate tricuspid regurgitation. The pulmonary artery systolic pressure   is moderately elevated. Pulmonary artery systolic pressure measures 50   mmHg. There is mid-systolic notching of the RVOT VTI consistent with   elevated pulmonary vascular resistance.   · The inferior vena cava is normal in size (diameter <21 mm) and decreases   >50% in size with inspiration, suggesting a normal right atrial pressure   of 3 mmHg (range 0-5 mm Hg).   · Trivial loculated pericardial effusion present adjacent to the right   ventricle and adjacent to the right atrium.          Assessment / Plan:     1. Musculoskeletal Pain Including Chest Pain  - She reports chest, left shoulder, head, stomach and foot pain.   - Suspect this is auto-immune disease related inflammation; although, there has not been demonstrable improvement with Colchicine or steroids to date. Agree with steroid course.   - Consideration for Rheumatology consultation. As an outpatient, revisiting immune system suppression through biologics (or non-biologics) is reasonable.   - CCTA revealed moderate non-obstructive CAD, calcium score of 313  -Reports resolved diarrhea with lower Colchicine 0.3 mg daily.     2. Pericardial Effusion  - Moderate in size without clinical tamponade  - Will recheck TTE at one month if no interim clinical change - hemodynamically stable  - Continue Colchicine 0.3 mg daily    3. Pulmonary Hypertension (WHO Group I, NYHA/WHO FC III):   - Occurring in the background of Systemic Scleroderma with ILD. August 2022 TTE showed normal RV size and function, but progressive exertional decline, worsened DLCO and resting tachycardia suggest there may be progression of her pulmonary vascular disease and  limitation of cardiac output. This was proven with 11/2022 RHC showing mean PA 35 mmHg (with PCWP 13 mmHg) and PVR 4.3 Wood units. June 2023 TTE showed increase in RVSP. New TTE with RVSP decreased to 40s (even with higher estimated RAP).  - Continue uninterrupted Sildenafil and Macitentan. As discussed with team, Sildenafil doses should be re-timed today so that she gets all three.     4. HTN:   - Borderline elevated.   - Continued Amlodipine.   - Would start HCTZ 25 mg daily - this will help with BP and will also contribute some Potassium wasting as she normally runs high normal    5. Systemic Scleroderma / Raynaud's Disease:   - Per Rheumatology (Outpatient: Dr. Giraldo).      6. ILD:   - Per Pulmonology and Rheumatology (Dr. Giraldo) outpatient.   - She has not tolerated trials of Mycophenolate.     7. PE:   - She has had elevated D-dimer and elevated probability on V/Q scans, but CTA Chest has consistently not shown evidence for acute or chronic thromboembolism. Apixaban has been discontinued.     8. CAD  - LAD and RCA non-obstructive disease on 3/7 CCTA  - LDL goal < 70  -Continue Clopidogrel, no ASA due to allergy.   - Continue Atorvastatin 20 mg daily      RAS Marcos

## 2024-03-11 NOTE — NURSING NOTE
Pt. was ready for discharge; instructions had been given, but right before pt. Was going to leave she started vomiting dark brown liquid x 2. Notified MD. MD came to see pt; discharge cancelled. STAT labs ordered; IV team placed new IV and william labs. Pt. placed back on tele monitor. IV Zofran given. Portable CXR ordered and done at bedside. Pt.made NPO. VSS.

## 2024-03-11 NOTE — PLAN OF CARE
Care Coordination Discharge Plan Summary    Admission Assessment Summary    General Information  Readmission Within the last 30 days: no previous admission in last 30 days  Does patient have a : No  Patient-Specific Goals (include timeframe): D/C plan home; to provide emotional support to pt/family    Living Arrangements  Arrived From: home  Current Living Arrangements: home  People in Home: alone  Home Accessibility: stairs within home (Group), stairs to enter home (Group)  Living Arrangement Comments: pt lives alone/2 story home    Social Determinants of Health - Screenings  Housing Stability  In the last 12 months, was there a time when you were not able to pay the mortgage or rent on time?: No  In the last 12 months, how many places have you lived?: 1  In the last 12 months, was there a time when you did not have a steady place to sleep or slept in a shelter (including now)?: No  Utility Access  In the past 12 months has the electric, gas, oil, or water company threatened to shut off services in your home?: No  Transportation Needs  In the past 12 months, has lack of transportation kept you from medical appointments or from getting medications?: No  In the past 12 months, has lack of transportation kept you from meetings, work, or from getting things needed for daily living?: No    Functional Status Prior to Admission  Assistive Device/Animal Currently Used at Home: stair glide, shower chair  Functional Status Comments: Pt is I PTA  IADL Comments: pt is I PTA    Discharge Needs Assessment    Concerns to be Addressed: discharge planning  Current Discharge Risk: chronically ill, physical impairment  Anticipated Changes Related to Illness: none    Discharge Plan Summary    Patient Choice  Offered/Gave Vendor List: yes  Patient's Choice of Community Agency(s): [facility]   Patient and/or patient guardian/advocate was made aware of their right to choose a provider. A list of eligible providers was  presented and reviewed with the patient and/or patient guardian/advocate in written and/or verbal form. The list delineates providers in the patient’s desired geographic area who are participating in the Medicare program and/or providers contracted with the patient’s primary insurance. The Medicare list and quality ratings were obtained from the Medicare.gov [medicare.gov] website.    Concerns / Comments: Per DPR, patient is planned for discharge today, home with [facility], the Liaison was updated. Patient has transportation arranged, daughter will provide. CC RN explained to patient that since she just had a WC ordered this admission that she would not be elligible for a walker until 5 years was completed. Patient is agreeable to the dispo plan. CC will continue to follow for transition of care needs until discharge is complete.    Discharge Plan:  Disposition/Destination: Home Health Care - Other / Home       Connection to Community  Not applicable    Community Resources:      Discharge Transportation:  Is Out of Hospital DNR needed at Discharge: no  Does patient need discharge transport? No

## 2024-03-12 PROBLEM — R11.2 NAUSEA AND VOMITING: Status: ACTIVE | Noted: 2024-03-12

## 2024-03-12 LAB
ALBUMIN SERPL-MCNC: 3.1 G/DL (ref 3.5–5.7)
ALP SERPL-CCNC: 50 IU/L (ref 34–125)
ALT SERPL-CCNC: 16 IU/L (ref 7–52)
ANION GAP SERPL CALC-SCNC: 8 MEQ/L (ref 3–15)
AST SERPL-CCNC: 15 IU/L (ref 13–39)
BILIRUB SERPL-MCNC: 0.3 MG/DL (ref 0.3–1.2)
BUN SERPL-MCNC: 23 MG/DL (ref 7–25)
CALCIUM SERPL-MCNC: 8.9 MG/DL (ref 8.6–10.3)
CHLORIDE SERPL-SCNC: 107 MEQ/L (ref 98–107)
CO2 SERPL-SCNC: 22 MEQ/L (ref 21–31)
CREAT SERPL-MCNC: 0.9 MG/DL (ref 0.6–1.2)
EGFRCR SERPLBLD CKD-EPI 2021: >60 ML/MIN/1.73M*2
ERYTHROCYTE [DISTWIDTH] IN BLOOD BY AUTOMATED COUNT: 19.3 % (ref 11.7–14.4)
GLUCOSE SERPL-MCNC: 77 MG/DL (ref 70–99)
HCT VFR BLD AUTO: 31.8 % (ref 35–45)
HGB BLD-MCNC: 9.8 G/DL (ref 11.8–15.7)
LACTATE SERPL-SCNC: 0.9 MMOL/L (ref 0.4–2)
MCH RBC QN AUTO: 26.7 PG (ref 28–33.2)
MCHC RBC AUTO-ENTMCNC: 30.8 G/DL (ref 32.2–35.5)
MCV RBC AUTO: 86.6 FL (ref 83–98)
PDW BLD AUTO: 11.1 FL (ref 9.4–12.3)
PLATELET # BLD AUTO: 287 K/UL (ref 150–369)
POTASSIUM SERPL-SCNC: 4.7 MEQ/L (ref 3.5–5.1)
PROT SERPL-MCNC: 7 G/DL (ref 6–8.2)
RBC # BLD AUTO: 3.67 M/UL (ref 3.93–5.22)
SODIUM SERPL-SCNC: 137 MEQ/L (ref 136–145)
WBC # BLD AUTO: 12.67 K/UL (ref 3.8–10.5)

## 2024-03-12 PROCEDURE — 63700000 HC SELF-ADMINISTRABLE DRUG

## 2024-03-12 PROCEDURE — 63700000 HC SELF-ADMINISTRABLE DRUG: Performed by: STUDENT IN AN ORGANIZED HEALTH CARE EDUCATION/TRAINING PROGRAM

## 2024-03-12 PROCEDURE — 99223 1ST HOSP IP/OBS HIGH 75: CPT | Performed by: SURGERY

## 2024-03-12 PROCEDURE — 83605 ASSAY OF LACTIC ACID: CPT | Performed by: STUDENT IN AN ORGANIZED HEALTH CARE EDUCATION/TRAINING PROGRAM

## 2024-03-12 PROCEDURE — 25000000 HC PHARMACY GENERAL

## 2024-03-12 PROCEDURE — 85027 COMPLETE CBC AUTOMATED: CPT

## 2024-03-12 PROCEDURE — 36415 COLL VENOUS BLD VENIPUNCTURE: CPT

## 2024-03-12 PROCEDURE — 99233 SBSQ HOSP IP/OBS HIGH 50: CPT | Performed by: HOSPITALIST

## 2024-03-12 PROCEDURE — 63600000 HC DRUGS/DETAIL CODE: Mod: JZ

## 2024-03-12 PROCEDURE — 99232 SBSQ HOSP IP/OBS MODERATE 35: CPT | Performed by: INTERNAL MEDICINE

## 2024-03-12 PROCEDURE — 80053 COMPREHEN METABOLIC PANEL: CPT

## 2024-03-12 PROCEDURE — 63600000 HC DRUGS/DETAIL CODE: Mod: JZ | Performed by: STUDENT IN AN ORGANIZED HEALTH CARE EDUCATION/TRAINING PROGRAM

## 2024-03-12 PROCEDURE — 21400000 HC ROOM AND CARE CCU/INTERMEDIATE

## 2024-03-12 RX ORDER — BISACODYL 10 MG/1
10 SUPPOSITORY RECTAL DAILY
Status: DISCONTINUED | OUTPATIENT
Start: 2024-03-12 | End: 2024-03-16 | Stop reason: HOSPADM

## 2024-03-12 RX ORDER — PREDNISONE 50 MG/1
50 TABLET ORAL
Status: COMPLETED | OUTPATIENT
Start: 2024-03-12 | End: 2024-03-12

## 2024-03-12 RX ORDER — ACETAMINOPHEN 325 MG/1
975 TABLET ORAL EVERY 6 HOURS PRN
Status: DISCONTINUED | OUTPATIENT
Start: 2024-03-12 | End: 2024-03-16 | Stop reason: HOSPADM

## 2024-03-12 RX ORDER — DIPHENHYDRAMINE HCL 25 MG
50 CAPSULE ORAL ONCE
Status: COMPLETED | OUTPATIENT
Start: 2024-03-12 | End: 2024-03-12

## 2024-03-12 RX ORDER — SODIUM CHLORIDE, SODIUM LACTATE, POTASSIUM CHLORIDE, CALCIUM CHLORIDE 600; 310; 30; 20 MG/100ML; MG/100ML; MG/100ML; MG/100ML
INJECTION, SOLUTION INTRAVENOUS CONTINUOUS
Status: ACTIVE | OUTPATIENT
Start: 2024-03-12 | End: 2024-03-13

## 2024-03-12 RX ORDER — SENNOSIDES 8.6 MG/1
2 TABLET ORAL NIGHTLY
Status: DISCONTINUED | OUTPATIENT
Start: 2024-03-12 | End: 2024-03-16 | Stop reason: HOSPADM

## 2024-03-12 RX ADMIN — MOMETASONE FUROATE AND FORMOTEROL FUMARATE DIHYDRATE 2 PUFF: 100; 5 AEROSOL RESPIRATORY (INHALATION) at 20:23

## 2024-03-12 RX ADMIN — SILDENAFIL 20 MG: 20 TABLET, FILM COATED ORAL at 20:22

## 2024-03-12 RX ADMIN — ACETAMINOPHEN 650 MG: 325 TABLET ORAL at 00:39

## 2024-03-12 RX ADMIN — AMLODIPINE BESYLATE 10 MG: 10 TABLET ORAL at 08:36

## 2024-03-12 RX ADMIN — MOMETASONE FUROATE AND FORMOTEROL FUMARATE DIHYDRATE 2 PUFF: 100; 5 AEROSOL RESPIRATORY (INHALATION) at 08:38

## 2024-03-12 RX ADMIN — PANTOPRAZOLE SODIUM 40 MG: 40 INJECTION, POWDER, FOR SOLUTION INTRAVENOUS at 20:22

## 2024-03-12 RX ADMIN — LEVOTHYROXINE SODIUM 100 MCG: 0.1 TABLET ORAL at 06:12

## 2024-03-12 RX ADMIN — TIOTROPIUM BROMIDE INHALATION SPRAY 2 PUFF: 3.12 SPRAY, METERED RESPIRATORY (INHALATION) at 08:39

## 2024-03-12 RX ADMIN — SILDENAFIL 20 MG: 20 TABLET, FILM COATED ORAL at 08:34

## 2024-03-12 RX ADMIN — MUPIROCIN 1 APPLICATION: 20 OINTMENT TOPICAL at 08:39

## 2024-03-12 RX ADMIN — PREDNISONE 50 MG: 50 TABLET ORAL at 17:15

## 2024-03-12 RX ADMIN — MIRTAZAPINE 7.5 MG: 15 TABLET, FILM COATED ORAL at 20:23

## 2024-03-12 RX ADMIN — BISACODYL 10 MG: 10 SUPPOSITORY RECTAL at 04:21

## 2024-03-12 RX ADMIN — PREDNISONE 50 MG: 50 TABLET ORAL at 23:10

## 2024-03-12 RX ADMIN — DIPHENHYDRAMINE HYDROCHLORIDE 50 MG: 25 CAPSULE ORAL at 23:10

## 2024-03-12 RX ADMIN — PREDNISONE 50 MG: 50 TABLET ORAL at 11:05

## 2024-03-12 RX ADMIN — ACETAMINOPHEN 975 MG: 325 TABLET ORAL at 20:22

## 2024-03-12 RX ADMIN — PANTOPRAZOLE SODIUM 40 MG: 40 INJECTION, POWDER, FOR SOLUTION INTRAVENOUS at 08:42

## 2024-03-12 RX ADMIN — ONDANSETRON 4 MG: 2 INJECTION INTRAMUSCULAR; INTRAVENOUS at 01:43

## 2024-03-12 RX ADMIN — Medication 1000 UNITS: at 08:35

## 2024-03-12 RX ADMIN — Medication 100 MCG: at 08:34

## 2024-03-12 RX ADMIN — ACETAMINOPHEN 975 MG: 325 TABLET ORAL at 08:34

## 2024-03-12 RX ADMIN — ATORVASTATIN CALCIUM 20 MG: 20 TABLET, FILM COATED ORAL at 17:15

## 2024-03-12 RX ADMIN — SODIUM CHLORIDE, POTASSIUM CHLORIDE, SODIUM LACTATE AND CALCIUM CHLORIDE: 600; 310; 30; 20 INJECTION, SOLUTION INTRAVENOUS at 15:04

## 2024-03-12 RX ADMIN — COLCHICINE 0.3 MG: 0.6 TABLET ORAL at 08:34

## 2024-03-12 RX ADMIN — SENNOSIDES 2 TABLET: 8.6 TABLET, FILM COATED ORAL at 04:20

## 2024-03-12 RX ADMIN — SILDENAFIL 20 MG: 20 TABLET, FILM COATED ORAL at 15:04

## 2024-03-12 ASSESSMENT — COGNITIVE AND FUNCTIONAL STATUS - GENERAL
WALKING IN HOSPITAL ROOM: 3 - A LITTLE
CLIMB 3 TO 5 STEPS WITH RAILING: 3 - A LITTLE
STANDING UP FROM CHAIR USING ARMS: 2 - A LOT
MOVING TO AND FROM BED TO CHAIR: 3 - A LITTLE
STANDING UP FROM CHAIR USING ARMS: 3 - A LITTLE
MOVING TO AND FROM BED TO CHAIR: 3 - A LITTLE
CLIMB 3 TO 5 STEPS WITH RAILING: 2 - A LOT
WALKING IN HOSPITAL ROOM: 2 - A LOT

## 2024-03-12 NOTE — CONSULTS
General Surgery Consult  ACS [id number]    Subjective     Arielle Felix is a 63 y.o. female who was admitted for Pericardial effusion [I31.39]  Interstitial lung disease (CMS/HCC) [J84.9]  Hypoxia [R09.02].     Patient is a 63-year-old female with past medical history of scleroderma, ILD, GERD, PE (not on therapy 2/2 epistaxis and hemoptysis), pulmonary HTN on mecitentan/sildenafil, ventral hernia repair, appendectomy, tubal ligation who presented to the hospital on 3/3 with worsening SOB, chest pain, pressure over her sternum and palpitations. Patient was admitted to cardiothoracic ICU for further evaluation of moderate to large pericardial effusion.    At that time, VS; temperature 97.9, heart rate 132, respirations 24, blood pressure 138/67 satting 90% on room air with improvement to 96% on 1 L nasal cannula  Labs: Sodium 135, glucose 219, AST 11, ALT 5, albumin 3.2, bilirubin 0.2, lactate 1.1, troponin 12.6, white count 8.06, hemoglobin 9, platelets 217, D-dimer 0.68, UA trace leukocyte esterase, 1+ protein, 4-10 WBCs, rare squamous epithelial cells.     Patient was transferred out of the ICU and experienced diarrhea on 3/8 attributed to colchicine dosing. She was given PRN imodium and colchicine dose was decreased. C. difficile negative. Patient's diarrhea began to improve and suddenly stopped on 3/11. She was deemed stable for discharge, however in the afternoon she developed nausea and reportedly vomited dark brown/black material seven times. Repeat lab work showed increased leukocytosis and patient had worsening abdominal pain, so a CT abdomen pelvis was performed and demonstrated mildly dilated loops of small bowel, without definite discrete transition point. Possible early obstruction versus ileus. Thus general surgery was consulted.     Patient seen and examined at bedside. States that she began to have diarrhea earlier in the week after she was started on colchicine. Reports that two days ago she had  "36 episodes of diarrhea. Last dose of imodium was on 3/8. Patient states that she has had abdominal pain since admission that has not improved. Describes it as a \"pulling\" sensation near where she had \"surgical clamps\" placed. She last ate yesterday for lunch and is concerned about losing weight. Patient had a small bowel movement and flatus early this morning after dulcolax suppository administration. Currently she denies nausea or vomiting, stating that the zofran is helping    Afebrile, VSS, WBC 12.67 (15.65), Hgb 9.8 (10.9). CMP and lactate pending.      Medical History:   Past Medical History:   Diagnosis Date   • De Quervain's tenosynovitis    • Essential (primary) hypertension    • Follicular carcinoma of thyroid (CMS/HCC)    • Gastro-esophageal reflux    • Hand ulceration (CMS/HCC)    • Hyperlipidemia, unspecified    • Interstitial lung disease (CMS/HCC)    • Leg ulcer (CMS/HCC)    • Lung nodule    • Lupus (CMS/HCC)    • Osteomyelitis of hand (CMS/HCC)    • Osteoporosis    • Pulmonary hypertension (CMS/HCC)    • Raynaud's disease     Severe   • Reflux esophagitis    • Scleroderma (CMS/HCC)    • Screening mammogram for breast cancer 05/04/2021    BI-RADS category: 1 - Negative (care everywhere @ [place])       Surgical History:   Past Surgical History:   Procedure Laterality Date   • CARDIAC CATHETERIZATION     • COLONOSCOPY     • HERNIA REPAIR         Social History:   Social History     Social History Narrative   • Not on file       Family History:   Family History   Problem Relation Age of Onset   • Heart disease Biological Brother         stent   • Breast cancer Niece    • Cervical cancer Neg Hx    • Uterine cancer Neg Hx    • Colon cancer Neg Hx    • Ovarian cancer Neg Hx        Allergies: Aspirin, Ibuprofen, Iodine, Iodine and iodide containing products, Penicillins, Sulfa (sulfonamide antibiotics), Clindamycin, Hydrochlorothiazide, Oxycodone, Sulfamethoxazole-trimethoprim, and Latex    Home " Medications:  •  amLODIPine, Take 10 mg by mouth every morning.  •  amLODIPine, Take 5-10 mg by mouth nightly as needed (for high BP or Raynauds phenomenon of the fingers).  •  biotin, Take 1,000 mcg by mouth daily.  •  cholecalciferol (vitamin D3), Take 1,000 Units by mouth daily.  •  cyanocobalamin (vitamin B-12), Take 1,000 mcg by mouth daily.  •  docusate sodium, Take 100 mg by mouth daily.  •  fluticasone-umeclidinium-vilanterol, Inhale 1 puff daily.  •  levothyroxine, Take 100 mcg by mouth daily.  •  macitentan, Take 1 tablet (10 mg total) by mouth daily.  •  meclizine, Take 25 mg by mouth daily as needed.  •  mupirocin, Apply 1 application. topically daily. Behind ears  •  predniSONE, Take 1 tablet (50 mg total) by mouth every 6 (six) hours for 3 doses. Take 1 tab 13 hrs, 1 tab 7 hrs, and 1 tab 1 hr before exam.  •  sildenafiL (pulm.hypertension), Take 1 tablet (20 mg total) by mouth 3 (three) times a day.  •  tocilizumab (ACTEMRA IV), Infuse into a venous catheter.    Current Medications:  •  acetaminophen, 975 mg, oral, q6h PRN  •  amLODIPine, 10 mg, oral, q AM  •  [Provider Managed Hold] amLODIPine, 5-10 mg, oral, Nightly PRN  •  atorvastatin, 20 mg, oral, Daily (6p)  •  bisacodyL, 10 mg, rectal, Daily  •  cholecalciferol (vitamin D3), 1,000 Units, oral, Daily  •  clopidogreL, 75 mg, oral, Daily  •  colchicine, 0.3 mg, oral, Daily  •  cyanocobalamin, 100 mcg, oral, Daily  •  glucose, 16-32 g of dextrose, oral, PRN **OR** dextrose, 15-30 g of dextrose, oral, PRN **OR** glucagon, 1 mg, intramuscular, PRN **OR** dextrose 50 % in water (D50), 25 mL, intravenous, PRN  •  levothyroxine, 100 mcg, oral, Daily  •  lidocaine, 1 patch, Topical, Daily  •  loperamide, 2 mg, oral, 3x daily PRN  •  meclizine, 25 mg, oral, 3x daily PRN  •  metoprolol, 5 mg, intravenous, PRN  •  metoprolol tartrate, 50 mg, oral, q30 min PRN  •  mirtazapine, 7.5 mg, oral, Nightly  •  mometasone-formoterol, 2 puff, inhalation, BID (8a, 8p)  "**AND** tiotropium bromide, 2 puff, inhalation, Daily (8a)  •  mupirocin, 1 Application, Topical, Daily  •  ondansetron ODT, 4 mg, oral, q8h PRN **OR** ondansetron, 4 mg, intravenous, q8h PRN  •  pantoprazole, 40 mg, intravenous, BID  •  [Provider Managed Hold] predniSONE, 20 mg, oral, Daily  •  macitentan, 10 mg, oral, Daily  •  senna, 2 tablet, oral, Nightly  •  sildenafiL (pulm.hypertension), 20 mg, oral, TID  •  colchicine  •  loperamide  •  pantoprazole  •  predniSONE    Review of Systems  Review of Systems noted in HPI    Objective     Physicial Exam  Visit Vitals  BP (!) 149/70 (BP Location: Right upper arm, Patient Position: Lying)   Pulse 72   Temp 36.7 °C (98 °F) (Oral)   Resp 18   Ht 1.676 m (5' 6\")   Wt 46.4 kg (102 lb 4.8 oz)   LMP  (LMP Unknown)   SpO2 92%   BMI 16.51 kg/m²       Physical Exam  Constitutional:       Appearance: She is ill-appearing.   HENT:      Head: Normocephalic and atraumatic.   Cardiovascular:      Rate and Rhythm: Normal rate and regular rhythm.   Pulmonary:      Effort: Pulmonary effort is normal.   Abdominal:      General: Abdomen is flat.      Palpations: Abdomen is soft.      Tenderness: There is abdominal tenderness. There is no guarding or rebound.      Comments: Soft, minimal distention, diffusely tender to palpation (worse in lower quadrants)   Neurological:      General: No focal deficit present.      Mental Status: She is alert.       Labs  CBC Results       03/12/24 03/11/24 03/11/24     0723 1730 0629    WBC 12.67 15.64 10.69    RBC 3.67 4.13 4.06    HGB 9.8 10.9 10.6    HCT 31.8 35.4 35.4    MCV 86.6 85.7 87.2    MCH 26.7 26.4 26.1    MCHC 30.8 30.8 29.9     327 311        CMP Results       03/12/24 03/11/24 03/11/24     0723 1730 0628     136 139    K 4.7 5.4 4.5    Cl 107 105 109    CO2 22 23 21    Glucose 77 128 67    BUN 23 24 24    Creatinine 0.9 0.9 0.9    Calcium 8.9 9.1 9.0    Anion Gap 8 8 9    AST 15 25 12    ALT 16 17 10    Albumin 3.1 3.5 3.3 "    EGFR >60.0 >60.0 >60.0         Comment for EGFR at 0723 on 03/12/24: Calculation based on the Chronic Kidney Disease Epidemiology Collaboration (CKD-EPI) equation refit without adjustment for race.    Comment for EGFR at 1730 on 03/11/24: Calculation based on the Chronic Kidney Disease Epidemiology Collaboration (CKD-EPI) equation refit without adjustment for race.    Comment for EGFR at 0628 on 03/11/24: Calculation based on the Chronic Kidney Disease Epidemiology Collaboration (CKD-EPI) equation refit without adjustment for race.            Imaging  I have reviewed the Imaging from the last 24 hrs.  CT ABDOMEN PELVIS WITHOUT IV CONTRAST    Result Date: 3/11/2024  CLINICAL HISTORY: leukocytosis with new vomiting TECHNIQUE: CT chest: Helical acquisition with axial reconstructions without intravenous contrast. Coronal and sagittal reformats created. CT abdomen and pelvis: Helical acquisition without intravenous or oral contrast. Coronal and sagittal reconstructions also performed. CT DOSE:  One or more dose reduction techniques (e.g. automated exposure control, adjustment of the mA and/or kV according to patient size, use of iterative reconstruction technique) utilized for this examination COMPARISON: Chest radiograph earlier same date CT chest 3/3/2024 CT abdomen and pelvis 11/27/2020 FINDINGS: Evaluation compromised without intravenous contrast. CHEST: -Lungs And Large Airways:Emphysema.  Patchy opacities lower lungs, slightly improved. -Miriam/Mediastinum/Axilla: Mildly enlarged nodes, slightly improved compared to prior. -Pleura: No pleural effusion or nodule. No pneumothorax. -Vascular: No aortic aneurysm or any significant vascular finding. -Chest Wall And Lower Neck: Unremarkable. -Bones: No acute findings. -Heart: Enlarged. Moderate to large pericardial effusion, similar to prior. ABDOMEN: -Liver: No mass or any significant abnormality. -Gallbladder: Cholelithiasis. -Bile Ducts: No significant biliary ductal  dilatation. -Spleen:  No splenomegaly or focal lesion. -Pancreas: No mass, ductal dilatation, or inflammatory changes. -Kidneys: No solid mass or hydronephrosis, or any definite calculi. -Adrenals:  No nodules. -Lymph Nodes: No adenopathy. -Vascular: No aortic aneurysm or any significant vascular abnormalities. -Abdominal Wall: No hernia, fluid collection, or any significant findings. -Bones: No acute findings BOWEL/MESENTERY: Mildly dilated loops of small bowel, measures up to approximately 3 cm.  No discrete transition point appreciated. Large bowel normal in caliber. Appendix normal. No ascites.  No collection.  No free air. PELVIS: -Bladder: Unremarkable -Reproductive Organs:No gross mass -Lymph Nodes: No mass or lymphadenopathy. -Miscellaneous: No free fluid     IMPRESSION: Patchy opacities in the lower lungs, partially improved compared to prior CT 3/3/2024.  Recommend continued follow-up. Moderate to large pericardial effusion, similar to prior. Mildly dilated loops of small bowel, without definite discrete transition point. Possible early obstruction versus ileus. If symptoms persist, recommend repeat CT abdomen and pelvis with intravenous and oral contrast.     CT CHEST WITHOUT IV CONTRAST    Result Date: 3/11/2024  CLINICAL HISTORY: leukocytosis with new vomiting TECHNIQUE: CT chest: Helical acquisition with axial reconstructions without intravenous contrast. Coronal and sagittal reformats created. CT abdomen and pelvis: Helical acquisition without intravenous or oral contrast. Coronal and sagittal reconstructions also performed. CT DOSE:  One or more dose reduction techniques (e.g. automated exposure control, adjustment of the mA and/or kV according to patient size, use of iterative reconstruction technique) utilized for this examination COMPARISON: Chest radiograph earlier same date CT chest 3/3/2024 CT abdomen and pelvis 11/27/2020 FINDINGS: Evaluation compromised without intravenous contrast. CHEST:  -Lungs And Large Airways:Emphysema.  Patchy opacities lower lungs, slightly improved. -Miriam/Mediastinum/Axilla: Mildly enlarged nodes, slightly improved compared to prior. -Pleura: No pleural effusion or nodule. No pneumothorax. -Vascular: No aortic aneurysm or any significant vascular finding. -Chest Wall And Lower Neck: Unremarkable. -Bones: No acute findings. -Heart: Enlarged. Moderate to large pericardial effusion, similar to prior. ABDOMEN: -Liver: No mass or any significant abnormality. -Gallbladder: Cholelithiasis. -Bile Ducts: No significant biliary ductal dilatation. -Spleen:  No splenomegaly or focal lesion. -Pancreas: No mass, ductal dilatation, or inflammatory changes. -Kidneys: No solid mass or hydronephrosis, or any definite calculi. -Adrenals:  No nodules. -Lymph Nodes: No adenopathy. -Vascular: No aortic aneurysm or any significant vascular abnormalities. -Abdominal Wall: No hernia, fluid collection, or any significant findings. -Bones: No acute findings BOWEL/MESENTERY: Mildly dilated loops of small bowel, measures up to approximately 3 cm.  No discrete transition point appreciated. Large bowel normal in caliber. Appendix normal. No ascites.  No collection.  No free air. PELVIS: -Bladder: Unremarkable -Reproductive Organs:No gross mass -Lymph Nodes: No mass or lymphadenopathy. -Miscellaneous: No free fluid     IMPRESSION: Patchy opacities in the lower lungs, partially improved compared to prior CT 3/3/2024.  Recommend continued follow-up. Moderate to large pericardial effusion, similar to prior. Mildly dilated loops of small bowel, without definite discrete transition point. Possible early obstruction versus ileus. If symptoms persist, recommend repeat CT abdomen and pelvis with intravenous and oral contrast.     X-RAY CHEST 1 VIEW    Result Date: 3/11/2024  CLINICAL HISTORY: rule out esophageal perf     IMPRESSION: Esophageal perforation not excluded on a radiograph examination. CT chest with  luminal contrast, esophagram protocol, can be considered. COMMENT:Frontal portable semierect view of the chest performed. Cardiomegaly. Atheromatous and atherosclerotic changes of the imaged portions of the thoracic aorta. Lungs are clear. Comparison made with March 3, 2024 examination. No evidence of a pneumomediastinum within the limitations of this semierect examination.        Assessment   Patient is a 63-year-old female with past medical history of scleroderma, ILD, GERD, PE (not on therapy 2/2 epistaxis and hemoptysis), pulmonary HTN on mecitentan/sildenafil, ventral hernia repair, appendectomy, tubal ligation who presented to the hospital on 3/3 with worsening SOB, chest pain, pressure over her sternum and palpitations. Patient was admitted to cardiothoracic ICU for further evaluation of moderate to large pericardial effusion. Patient experienced diarrhea on 3/8 attributed to colchicine dosing. She was given PRN imodium and colchicine dose was decreased. C. difficile negative. On 3/11 she developed nausea and vomiting, reportedly vomiting dark brown/black material seven times. Repeat lab work showed increased leukocytosis and patient had worsening abdominal pain. CT abdomen pelvis demonstrated mildly dilated loops of small bowel, without definite discrete transition point. Possible early obstruction versus ileus. General surgery was consulted.         Plan     - Recommend CTA A/P to assess bowel perfusion, will need 13hr steroid prep first   - No acute surgical intervention at this time   - Continue NPO with supportive IVF, AROBF  - Recommend discontinuing PRN imodium and holding all bowel regimen agents (dulcolax & senna)  - F/u lactate   - NGT if emesis persists   - Patient was previously on a regular diet from 3/4 until 3/11, no current role for TPN     Joana Bermudez MD  General Surgery PGY-1   [facility]     Please page the ACS Service [phone] with any questions or concerns.

## 2024-03-12 NOTE — PROGRESS NOTES
Heart Failure / Pulmonary Hypertension Progress Note    Interval History: Seen and examined sitting up in bed. She reports worsened abdominal pain with continued chest and b/l foot pain. She denies light-headedness or shortness of breath. She expresses concerns about nutrition while NPO.     Consult Background:   Ms. Felix is a 63 y.o. female with a past medical history of Pulmonary HTN, HTN, Raynaud's Disease, Cutaneous Systemic Scleroderma (dx 1998), ILD, PE (3/2023). She is a patient of Dr. Nguyễn. She was previously followed by Dr. Jos Valdez at [facility]. Echocardiogram from 4/21/2022 showed LVEF: 55-60%, mild aortic valve thickening, pulmonary artery systolic pressure: 52 mmHg and trivial pericardial effusion. LHC and RHC (separate occasions) over the last 5-10 years which showed normal hemodynamics and normal coronaries. She had a RHC 9/02/2020 showing top-normal right sided filling pressures with mild pulmonary hypertension, top-normal PVR and normal pulmonary capillary wedge pressure. The cardiac index was normal, seen below.     On 6/27/2022, she was in [facility] [place] chest pain. EKG: NSR without ischemic changes. hsTrop: 12->16, d-dimer: 0.56, CXR showed cardiomegaly and diffuse interstitial prominence.     Dr. Nguyễn ordered echocardiogram, which was completed on 8/22/2022 and showed estimated RVSP = 50-55 mmHg, normal-sized LV with normal LV systolic function. Estimated EF 55- 60%. Wall motion grossly normal, mild concentric left ventricular hypertrophy, grade I LV diastolic dysfunction, probably normal RV size and function.    At her initial visit on 10/11/2022, she reported that she felt very short of breath with minimal activity most of the time. She reports that this started about 1 year prior and had progressively gotten worse. She described PND and bendopnea. She reported that she experienced ankle swelling, worsened over the past year and usually worse towards the end of the night. She also  reported some swelling in her abdomen. She reported daily palpations. I recommended a RHC which was done 11/28/2022 and showed: Normal right sided filling pressures. Mildly elevated left sided filling pressures. Precapillary pulmonary hypertension with mean PA 36 mmHg.    She was hospitalized on 3/19/2023 at [facility] for PE diagnosed on V/Q scan. She reports that she had presented with left arm pain and heaviness. She states that she was started on Apixaban. One week after her last office visit (4/6/2023), she presented to [facility] with chest pain, epistaxis and hemoptysis. Echocardiogram showed: left ventricular cavity size normal with mild left ventricular hypertrophy and preserved systolic function. Estimated EF 65%. Chest CTA showed no evidence of PE; Apixaban was discontinued.     She had a repeat echocardiogram (6/2023) which showed: Normal left ventricular cavity size and mild concentric left ventricular hypertrophy. Normal systolic function. EF: 60%. Normal right ventricular structure and function. Mild tricuspid valve regurgitation with estimated RVSP: 55 mmHg. Compared to previous study from 4/14/2023, estimated right ventricular systolic pressure were higher.    At her last office visit on 7/25/2023, she reported that she was not sure if she received/started the Macitentan. She stated at the time we initiated it, her Pulmonologist also started her on a new medication (Mycophenolate), and she had significant GI intolerance with this. It was trialed at various doses but ultimately stopped. At that visit, I asked her to check if she had Macitentan and if she did to start it and monitor her SpO2.    She underwent V/Q scan in September 2023, which showed intermediate-to-high probability of pulmonary embolic disease, as a result she completed CTA Chest PE which showed: no evidence for filling defect or vessel cutoff to suggest pulmonary embolism. Enlargement of the pulmonary arteries compatible with  "pulmonary arterial hypertension was seen.    She started Macitentan around August or September 2023.    She was last seen in the office in February 2024.     [place] [facility] [place] with chest pressure, palpitations, nausea. She was noted to have hypoxia and tachypnea at presentation. Given moderate to large pericardial effusion, she was monitored in CCU and transferred out on 3/6. hsTroponin was not elevated. She remained hemodynamically stable while in CCU. She has been started on Colchicine and steroids without relief of multi-system pain. She is planned for CCTA this AM. Hypoxia and tachypnea have resolved.     ---    Rheumatology: [facility] ([facility])     Past Medical / Surgical History:  Pulmonary HTN, HTN, Raynaud's Disease, Cutaneous Systemic Scleroderma (dx 1998), ILD, PE (3/2023), Follicular Carcinoma of Thyroid, Tenosynovitis, de Quervain, h/o Hernia Repair, h/o Appendicitis, h/o Digit Amputation, H/o Total Thyroidectomy (Dr. Pacheco at [facility]; 4/2013)    Family & Social History: She is , she has two children. She works as an .     Tobacco: former 2005  EtOH: never   Illicits: never     Her brother has CAD with stent  1st cousin with SLE  Both parents had \"heart problems\"    Allergies:   Allergies   Allergen Reactions   • Aspirin Shortness of breath     Other reaction(s): Unknown  \"stop breathing\"     • Ibuprofen Shortness of breath     Stop breathing   • Iodine Shortness of breath     Other reaction(s): Unknown   • Iodine And Iodide Containing Products Shortness of breath     ? rash   • Penicillins Shortness of breath     Other reaction(s): Unknown   • Sulfa (Sulfonamide Antibiotics) Angioedema   • Clindamycin    • Hydrochlorothiazide      Other reaction(s): Abdominal Pain   Slow heart rate   • Oxycodone      Other reaction(s): Vomiting   • Sulfamethoxazole-Trimethoprim      Other reaction(s): Vomiting, Weakness    • Latex Rash       Medications:   Current Facility-Administered Medications "   Medication Dose Route Frequency Provider Last Rate Last Admin   • acetaminophen (TYLENOL) tablet 975 mg  975 mg oral q6h PRN Brady Kim MD   975 mg at 03/12/24 0834   • amLODIPine (NORVASC) tablet 10 mg  10 mg oral q AM Patricia Haley DO   10 mg at 03/12/24 0836   • [Provider Managed Hold] amLODIPine (NORVASC) tablet 5-10 mg  5-10 mg oral Nightly PRN Dee Arellano DO       • atorvastatin (LIPITOR) tablet 20 mg  20 mg oral Daily (6p) Wolf Bernard MD   20 mg at 03/11/24 1739   • bisacodyL (DULCOLAX) 10 mg suppository 10 mg  10 mg rectal Daily Brady Kim MD   10 mg at 03/12/24 0421   • cholecalciferol (vitamin D3) tablet 1,000 Units  1,000 Units oral Daily Dee Arellano DO   1,000 Units at 03/12/24 0835   • clopidogreL (PLAVIX) tablet 75 mg  75 mg oral Daily Wolf Bernard MD       • colchicine (COLCRYS) tablet 0.3 mg  0.3 mg oral Daily Octavio Giordano MD   0.3 mg at 03/12/24 0834   • cyanocobalamin (VITAMIN B12) tablet 100 mcg  100 mcg oral Daily Dee Arellano DO   100 mcg at 03/12/24 0834   • glucose chewable tablet 16-32 g of dextrose  16-32 g of dextrose oral PRN Dee Arellano DO        Or   • dextrose 40 % oral gel 15-30 g of dextrose  15-30 g of dextrose oral PRN Dee Arellano DO        Or   • glucagon (GLUCAGEN) injection 1 mg  1 mg intramuscular PRN Dee Arellano DO        Or   • dextrose 50 % in water (D50) injection 12.5 g  25 mL intravenous PRN Dee Arellano DO       • levothyroxine (SYNTHROID) tablet 100 mcg  100 mcg oral Daily Dee Arellano DO   100 mcg at 03/12/24 0612   • lidocaine (ASPERCREME) 4 % topical patch 1 patch  1 patch Topical Daily Sherita Panchal MD   1 patch at 03/10/24 0954   • [Provider Managed Hold] loperamide (IMODIUM) capsule 2 mg  2 mg oral 3x daily PRN Wolf Bernard MD   2 mg at 03/08/24 2051   •  meclizine (ANTIVERT) chewable tablet 25 mg  25 mg oral 3x daily PRN Wganer Chen MD   25 mg at 03/05/24 1817   • metoprolol (LOPRESSOR) injection 5 mg  5 mg intravenous PRN Timothy Arvizu DO   5 mg at 03/07/24 1215   • metoprolol tartrate (LOPRESSOR) tablet 50 mg  50 mg oral q30 min PRN Timothy Arvizu DO   50 mg at 03/07/24 1000   • mirtazapine (REMERON) tablet 7.5 mg  7.5 mg oral Nightly Wolf Bernard MD   7.5 mg at 03/11/24 2154   • mometasone-formoterol (DULERA 100) 100-5 mcg/actuation inhaler 2 puff  2 puff inhalation BID (8a, 8p) Dee Arellano DO   2 puff at 03/12/24 0838    And   • tiotropium bromide (SPIRIVA RESPIMAT) 2.5 mcg/actuation inhaler 2 puff  2 puff inhalation Daily (8a) Dee Arellano DO   2 puff at 03/12/24 0839   • mupirocin (BACTROBAN) 2 % ointment 1 Application  1 Application Topical Daily Dee Arellano DO   1 Application at 03/12/24 0839   • ondansetron ODT (ZOFRAN-ODT) disintegrating tablet 4 mg  4 mg oral q8h PRN Dee Arellano DO   4 mg at 03/11/24 1621    Or   • ondansetron (ZOFRAN) injection 4 mg  4 mg intravenous q8h PRN Dee Arellano DO   4 mg at 03/12/24 0143   • pantoprazole (PROTONIX) injection 40 mg  40 mg intravenous BID Brady Kim MD   40 mg at 03/12/24 0842   • [Provider Managed Hold] predniSONE (DELTASONE) tablet 20 mg  20 mg oral Daily Patricia Haley, DO   20 mg at 03/11/24 0942   • Pt Own- Macitentan (OPSUMIT) tablet 10mg  10 mg oral Daily Dee Arellano DO   10 mg at 03/12/24 0838   • senna (SENOKOT) tablet 2 tablet  2 tablet oral Nightly Brady Kim MD   2 tablet at 03/12/24 0420   • sildenafiL (pulm.hypertension) (REVATIO) tablet 20 mg  20 mg oral TID Octavio Giordano MD   20 mg at 03/12/24 0834       Review of Systems:   All other systems reviewed and are negative except as per HPI.    Physical Exam:     Wt Readings from Last 10 Encounters:   03/12/24 46.4  kg (102 lb 4.8 oz)   02/01/24 47.6 kg (105 lb)   07/25/23 50.8 kg (112 lb)   07/25/23 50.8 kg (112 lb)   06/21/23 51.3 kg (113 lb)   04/19/23 51.7 kg (113 lb 14.4 oz)   04/06/23 52.6 kg (116 lb)   01/18/23 54.4 kg (120 lb)   11/28/22 55.3 kg (122 lb)   10/07/22 56.2 kg (124 lb)       BP Readings from Last 5 Encounters:   03/12/24 (!) 148/69   02/01/24 120/70   07/25/23 130/80   07/25/23 134/80   06/21/23 132/85       Vitals:    03/12/24 0812   BP: (!) 148/69   Pulse: 71   Resp: 18   Temp: 36.7 °C (98.1 °F)   SpO2: 96%       Body mass index is 16.51 kg/m².  Body surface area is 1.47 meters squared.    Constitutional: NAD, AAOx3  HENT: Normocephalic, atraumatic head, sclerae anicteric, no cervical lymphadenopathy, trachea midline  Cardiovascular: RRR, no murmurs, rubs or gallops, JVP: 7 cm H2O   cm H2O, no carotid bruits.  Respiratory: CTA bilateral lung fields, no wheezes, rales or rhonchi  GI: soft, non-tender/non-distended  Musculoskeletal / Extremities: no peripheral edema, +2 pulses bilateral radial, DP/PT arteries, skin tightening on face and fingertips  Skin: no rashes  Neuro / Psych: no focal deficits, CNII-XII grossly intact, appropriate, cooperative    Diagnostic Data:     Results from last 7 days   Lab Units 03/12/24  0723 03/11/24  1730 03/11/24  0628   SODIUM mEQ/L 137 136 139   POTASSIUM mEQ/L 4.7 5.4* 4.5   CHLORIDE mEQ/L 107 105 109*   CO2 mEQ/L 22 23 21   BUN mg/dL 23 24 24   CREATININE mg/dL 0.9 0.9 0.9   CALCIUM mg/dL 8.9 9.1 9.0   ALBUMIN g/dL 3.1* 3.5 3.3*   BILIRUBIN TOTAL mg/dL 0.3 0.3 0.3   ALK PHOS IU/L 50 58 55   ALT IU/L 16 17 10   AST IU/L 15 25 12*   GLUCOSE mg/dL 77 128* 67*     Results from last 7 days   Lab Units 03/12/24  0723 03/11/24  1730 03/11/24  0629   WBC K/uL 12.67* 15.64* 10.69*   HEMOGLOBIN g/dL 9.8* 10.9* 10.6*   HEMATOCRIT % 31.8* 35.4 35.4   MCV fL 86.6 85.7 87.2   PLATELETS K/uL 287 327 311     Component      Latest Ref Rng 4/13/2023 2/27/2024 3/3/2024   BNP      <=100  pg/mL 105 (H)  82  119 (H)       Component      Latest [facility] [place] 3/3/2024   High Sens Troponin I      <15.0 pg/mL 12.6    High Sens Troponin I       12.2      Component      Latest [facility] [place] 3/4/2024   CRP      <7.00 mg/L 8.90 (H)       Component      Latest [facility] [place] 3/4/2024   Sed Rate      0 - 30 mm/hr 119 (H)       Component      Latest [facility] [place] 3/3/2024   D-Dimer, Quant      0.00 - 0.50 ug/mL FEU 0.68 (H)       Component      Latest [facility] [place] 4/18/2023 9/27/2023   Sodium      134 - 144 mmol/L 139  140    Potassium, Bld      3.5 - 5.2 mmol/L 4.4  4.1    Chloride      96 - 106 mmol/L 108  107 (H)    CO2      20 - 29 mmol/L 23  20    BUN      8 - 27 mg/dL 26 (H)  18    Creatinine      0.57 - 1.00 mg/dL 1.1  0.96    Glucose      70 - 99 mg/dL 82  83    Calcium      8.7 - 10.3 mg/dL 9.1  9.5    eGFR      >59 mL/min/1.73 >60.0  67      Component      Latest [facility] [place] 4/13/2023   Hemoglobin A1C      <5.7 % 4.4      Component      Latest [facility] [place] 4/13/2023 4/18/2023   WBC      3.80 - 10.50 K/uL 7.12  6.95    Hemoglobin      11.8 - 15.7 g/dL 10.6 (L)  10.1 (L)    Hematocrit      35.0 - 45.0 % 34.3 (L)  33.0 (L)    MCV      83.0 - 98.0 fL 87.7  90.4    Platelets      150 - 369 K/uL 267  256        Component      Latest [facility] [place] 4/14/2023   Triglycerides      30 - 149 mg/dL 44    Cholesterol      <=200 mg/dL 194    HDL      >=55 mg/dL 49 (L)    LDL Calculated      <=100 mg/dL 136 (H)    Non-HDL, Calculated      mg/dL 145    RISK      <=5.0  4.0       Component      Latest [facility] [place] 4/13/2023   High Sens Troponin I      <15.0 pg/mL 23.0 (H)       Component      Latest [facility] [place] 4/14/2023   Ferritin      11 - 250 ng/mL 75      Component      Latest [facility] [place] 4/13/2023   TSH      0.34 - 5.60 mIU/L 4.71      Component      Latest Ref Rng 4/14/2023   Iron      35 - 150 ug/dL 29 (L)    TIBC      270 - 460 ug/dL 245 (L)    UIBC      180 - 360 ug/dL 216    Iron Saturation      15 - 45 % 12 (L)        Component      Latest Ref Rng 6/27/2022  4/13/2023   BNP      <=100 pg/mL 111 (H)  105 (H)                                     ---    ECG (3/3/2024, personally reviewed):   Sinus tachycardia   Nonspecific ST and T wave abnormality   HR: 129 bpm     ---    TTE (3/4/2024, personally reviewed):   • Normal-sized left ventricle. Mild left ventricular hypertrophy. Preserved systolic function. LVEF 60%. No regional wall motion abnormalities. Grade II diastolic dysfunction.  Normal global longitudinal strain (-20%).  • The aortic valve is not well seen.  Cannot determine the number of cusps.  Sclerotic leaflets.  No aortic stenosis.  Trace aortic insufficiency.  • Normal sized aortic root.  The visualized portion of the ascending aorta is normal in size.  • The mitral valve leaflets are thickened. Mild mitral annular calcification. Trace mitral regurgitation.   • Mildly dilated left atrium.  • Normal-sized right ventricle. Normal right ventricular systolic function.  • Thickened tricuspid valve. There is mild tricuspid regurgitation with estimated RVSP of 46 mmHg.   • Pulmonic valve is not well visualized. Trace pulmonic regurgitation.   • Mildly dilated right atrium.  • IVC is enlarged with <50% respiratory variation consistent with elevated right atrial filling pressure (at least 15 mmHg).   • Moderate, circumferential, circumferential pericardial effusion.  The largest pocket is located inferolaterally.  Elevated right atrial pressure.  No other clear echocardiographic features of increased pericardial pressure.  Correlate clinically.   • Compared to previous TTE from 6/21/2023, pericardial effusion now moderate in size.  Right atrial pressure now elevated.       CTA Chest (3/3/2024):   IMPRESSION:  1.  No evidence of acute pulmonary embolism to the level of the segmental  branches.  2.  Moderate to large pericardial effusion measuring higher than simple fluid  density.  3.  Lower lobe lung field predominant interstitial thickening with some  relative  subpleural sparing, consistent with a chronic interstitial lung disease,  unchanged from the prior study.  4.  Continued bilateral axillary, mediastinal, and bilateral hilar  lymphadenopathy, not definitely changed from the prior study, possibly related  to the patient's sarcoid.  5.  Emphysema.     CTA Chest PE (10/4/2023):  IMPRESSION:  1. No evidence for filling defect or vessel cutoff to suggest pulmonary  embolism.  Enlargement of the pulmonary arteries compatible with pulmonary  arterial hypertension.  2. Changes throughout the lungs as described above which can be seen with  systemic sclerosis/scleroderma.  Underlying pneumonia the lung bases cannot be  entirely excluded in the proper clinical setting.  3.  Additional stable findings as described above.        VQ (9/12/2023):  IMPRESSION:  Intermediate to high probability of pulmonary embolic disease.     6MWT (7/25/2023, on Sildenafil 20 mg TID):        TTE (6/21/2023, personally reviewed):  1. Normal left ventricular cavity size and mild concentric left ventricular hypertrophy. Normal systolic function. EF: 60%. No regional wall motion abnormalities. Grade I diastolic dysfunction.   2. Aortic valve cusps not well visualized. Trace aortic regurgitation. Aortic valve and root sclerosis.  3. Thickened mitral valve leaflets. Mild mitral annular calcification. Trace mitral regurgitation. Mildly dilated left atrial size.   4. Normal right ventricular structure and function. Mild tricuspid valve regurgitation with estimated RVSP: 55 mmHg (assuming right atrial pressure of 3 mmHg). Normal right atrial size. Pulmonic valve not well visualized. Trace pulmonic valve regurgitation. Pulsed wave Doppler of the RVOT systolic profile show decreased acceleration times ( msec) and late systolic notching consistent with elevated PVR.   5. IVC size is normal with > 50% inspiratory collapse consistent with normal right atrial pressure. Small pericardial effusion  without echocardiographic evidence of tamponade.  6. Compared to previous study from 4/14/2023, estimated right ventricular systolic pressure is higher.        TTE (4/14/2023, personally reviewed):   • The left ventricular cavity size is normal with mild left ventricular hypertrophy and preserved systolic function. Estimated EF 65%. No regional wall motion abnormalities. Grade I diastolic dysfunction.     • The aortic valve is tricuspid. Aortic valve sclerosis. Trace aortic regurgitation.      • The visualized portions of the aortic root and ascending aorta are normal in size.     • The mitral valve is mildly thickened. Mild mitral annular calcification. Trace mitral regurgitation.       • The left atrium is severely dilated.      • The right ventricle is normal in size with preserved systolic function     • The pulmonic valve is not well seen.     • The tricuspid valve is normal in appearance. mild tricuspid regurgitation with an estimated RVSP of 34 mmHg.       • Right atrium is normal in size.     • The IVC is small and collapses > 50% with inspiration consistent with normal right atrial pressures.     • Small pericardial effusion, mostly posterior.       • Compared to previous echocardiogram from 8/22/2022, there is no significant change        TTE (11/28/2022, personally reviewed):   • Normal right- and mildly elevated left-sided filling pressures (RA: 3 mmHg, PCWP: 13 mmHg)  • Elevated pulmonary artery pressures (60/22(35 mmHg)) in the setting of PCWP: 13 mmHg.   • Normal cardiac output and index (CO: 5.1 L/min, CI: 3.2 L/min/m2)  • PVR: 4.3 Wood units. SVR: 1840 dynes/sec/cm5      TTE (8/22/2022, personally reviewed):  · Normal-sized LV. Normal LV systolic function. Estimated EF 55- 60%. Wall motion appears grossly normal. Mild concentric left ventricular hypertrophy. Grade I LV diastolic dysfunction.  · Pulmonic valve not well visualized. Grossly normal pulmonic valve structure. Trace pulmonic valve  regurgitation. No pulmonic valve stenosis.  · Aortic valve not well visualized. Aortic valve not well seen but likely trileaflet. Focal aortic valve calcification present. Sclerotic aortic valve leaflets. Mild aortic valve regurgitation. No aortic valve stenosis.  · RV not well visualized. Normal-sized RV. Normal RV systolic function.  · Normal-sized RA.  · There is a small loculated pericardial effusion anterior to the right atrium. No cardiac tamponade.  · Sclerotic mitral valve. Mitral valve calcification of the anterior leaflet present.  · Trace mitral valve regurgitation.  · No significant mitral valve stenosis.  · Normal-sized IVC. IVC demonstrates normal respiratory collapse.  · Tricuspid valve structure is grossly normal. Mild to moderate tricuspid valve regurgitation.  · Estimated RVSP = 50-55 mmHg.  · Mildly dilated LA.  · No previous echocardiogram available for comparison.     TTE (4/21/2022, outside study):  This result has an attachment that is not available.   •  A complete transthoracic echocardiogram (including 2D, color flow   Doppler, spectral Doppler and M-mode imaging) was performed using the   standard protocol. The study quality was adequate.   •  The left ventricle is normal in size. There is moderately increased   wall thickness consistent with moderate concentric hypertrophy.   Endocardium is incompletely visualized, but no regional wall motion   abnormalities are noted in the visualized segments. Normal left   ventricular ejection fraction. The left ventricular ejection fraction by   visual estimate is 55% to 60%.   •  The right ventricle is normal in size. There is normal function of the   right ventricle.   •  The left atrium is normal in size. Left atrial volume is 58 mL.   •  The right atrial volume measures 52 mL. The right atrial volume index   measures 34 mL/m2.   •  There is trace mitral regurgitation. There is no mitral stenosis.   •  The aortic valve is trileaflet. There is mild  aortic valve thickening.   There is no aortic stenosis. There is trace aortic regurgitation.   •  There is mild to moderate tricuspid regurgitation. Pulmonary artery   systolic pressure measures 52 mmHg. The pulmonary artery systolic pressure   is moderately elevated.   •  The aorta measures 3.2 cm at the sinus of Valsalva. The proximal   segment of the ascending aorta is mildly enlarged. The proximal segment of   the ascending aorta measures 3.4 cm. The proximal segment of the ascending   aorta measures 2.2 cm/m2, indexed for body surface area.   •  Trivial pericardial effusion present. There is no echocardiographic   evidence of cardiac tamponade.   •  The inferior vena cava is normal in size (diameter <21 mm) and   decreases >50% in size with inspiration, suggesting a normal right atrial   pressure of 3 mmHg (range 0-5 mm Hg).   •  Compared to the prior study of 3/13/2020, there are no significant   changes.        PFT (3/23/2022):      PFT (7/14/2021):      CT Chest (5/2/2021, outside study):  CLINICAL INFORMATION: Systemic sclerosis. Interstitial lung disease. Groundglass nodule     PROCEDURE: Unenhanced CT of the chest was performed using thin section reconstructions. Images were obtained on inspiration and expiration.     COMPARISON: June and August 2020     FINDINGS:     LUNGS, PLEURA:     Groundglass opacities with reticulation with subpleural sparing in the lower lobes, without honeycombing or architectural distortion, unchanged.     Right upper lobe groundglass nodule, image 114 of series 3, 8 x 11 mm, previously 6 mm.     Expiratory images are of diagnostic quality and do not demonstrate evidence of clinically significant air trapping.     CARDIOVASCULAR, MEDIASTINUM, THYROID: Coronary calcifications. Trace pericardial effusion.     LYMPH NODES: Subcentimeter mediastinal lymph nodes, unchanged. Unchanged axillary lymph nodes.     SKELETON, CHEST WALL: No destructive bone lesion     UPPER ABDOMEN:  Patulous esophagus. 3 mm right renal stone.       RHC (9/02/2020):  RA   8 mmHg   RV   40/5 mmHg   PA (mean)  44/16 (25) mmHg   PCWP   12 mmHg   CO   4.65 lpm (Thermodilution)   CI   2.65 lpm/m-squared   SVI    33 ml/beat/m-squared (lower limit normal 29)   PVR   2.8 mmHg/l/min (Wood units)   SVR   2064 dyne-sec-cm-5         PFT (3/18/2020):      CCTA (3/7/2024, personally reviewed):      LEFT MAIN: Patent.     LAD: Proximal: Calcified plaque with 30 to 50% stenosis.  Mid: Mixed plaque with  50 to 70% stenosis, CT FFR 0.89.  Distal: Patent.  D1: Patent  D2: Patent, CT FFR 0.88     LCX: The circumflex and 1 marginal branch are patent with normal CT FFR.     RCA: Proximal: Calcified plaque with minimal stenosis.  Otherwise the RCA, PDA,  and PLB are patent with normal CT FFR.     CORONARY CALCIUM COMPOSITE AGATSTON SCORE: 313  LM: LAD: 312    LCX: RCA: 1     This places the patient in the HICKS 96th risk percentile for age and gender  matched individuals.     VENTRICULAR FUNCTION AND WALL MOTION     LV EJECTION FRACTION: 66%  LV WALL MOTION: Normal    SUMMARY:  1. Two-vessel coronary artery disease as above that is moderate in severity.  CT  FFR is normal..  2. There is mitral annular calcification and aortic sclerosis.  The right atrium  is enlarged.  There is left ventricular hypertrophy.  There is a moderate to  large circumferential pericardial effusion.  3. Normal left ventricular wall motion and systolic function.  4. Coronary calcium score 313, 96th percentile         TTE (3/13/2020, outside study):  · The study quality was good.   · The left ventricle is normal in size. Top normal left ventricular wall   thickness. There are no segmental wall motion abnormalities. The left   ventricular ejection fraction is 55-60% by visual estimate and 3D   echocardiography. Normal left ventricular ejection fraction.   · There is grade I (mild) LV diastolic dysfunction.   · The right ventricle is normal in size. There is  normal function of the   right ventricle.   · The right atrium is mildly dilated.   · There is mild mitral valve anterior leaflet thickening. There is mild   mitral valve leaflet calcification. There is flattening of the mitral   leaflets without raphael prolapse. There is trace mitral regurgitation.   · The aortic valve is trileaflet. There is mild thickening of the aortic   valve. There is mild calcification of the aortic valve. There is no aortic   /stenosis. There is trace aortic regurgitation.   · There is mild tricuspid valve leaflet thickening. There is mild to   moderate tricuspid regurgitation. The pulmonary artery systolic pressure   is moderately elevated. Pulmonary artery systolic pressure measures 50   mmHg. There is mid-systolic notching of the RVOT VTI consistent with   elevated pulmonary vascular resistance.   · The inferior vena cava is normal in size (diameter <21 mm) and decreases   >50% in size with inspiration, suggesting a normal right atrial pressure   of 3 mmHg (range 0-5 mm Hg).   · Trivial loculated pericardial effusion present adjacent to the right   ventricle and adjacent to the right atrium.          Assessment / Plan:     1. Musculoskeletal Pain Including Chest Pain  - Suspect this is auto-immune disease related inflammation; although, there has not been demonstrable improvement with Colchicine or steroids to date. Agree with steroid course.   - Consideration for Rheumatology consultation. As an outpatient, revisiting immune system suppression through biologics (or non-biologics) is reasonable.   - CCTA revealed moderate non-obstructive CAD, calcium score of 313  -Reports resolved diarrhea with lower Colchicine 0.3 mg daily.     2. Pericardial Effusion  - Moderate in size without clinical tamponade  - Will recheck TTE at one month if no interim clinical change - hemodynamically stable  - Continue Colchicine 0.3 mg daily    3. Pulmonary Hypertension (WHO Group I, NYHA/WHO FC III):   -  Occurring in the background of Systemic Scleroderma with ILD. August 2022 TTE showed normal RV size and function, but progressive exertional decline, worsened DLCO and resting tachycardia suggest there may be progression of her pulmonary vascular disease and limitation of cardiac output. This was proven with 11/2022 RHC showing mean PA 35 mmHg (with PCWP 13 mmHg) and PVR 4.3 Wood units. June 2023 TTE showed increase in RVSP. New TTE with RVSP decreased to 40s (even with higher estimated RAP).  - Continue uninterrupted Sildenafil and Macitentan.     4. HTN:   - Borderline elevated.   - Continued Amlodipine.   - Would start HCTZ 25 mg daily - this will help with BP and will also contribute some Potassium wasting as she normally runs high normal    5. Systemic Scleroderma / Raynaud's Disease:   - Per Rheumatology (Outpatient: Dr. Giraldo).      6. ILD:   - Per Pulmonology and Rheumatology (Dr. Giraldo) outpatient.   - She has not tolerated trials of Mycophenolate.     7. PE:   - She has had elevated D-dimer and elevated probability on V/Q scans, but CTA Chest has consistently not shown evidence for acute or chronic thromboembolism. Apixaban has been discontinued.     8. CAD  - LAD and RCA non-obstructive disease on 3/7 CCTA  - LDL goal < 70  -Continue Clopidogrel, no ASA due to allergy.   - Continue Atorvastatin 20 mg daily      Timothy Arvizu DO

## 2024-03-12 NOTE — ASSESSMENT & PLAN NOTE
Patient developed nausea and dark vomiting on 3/11 afternoon   CT abdomen showed dilated loops of bowel that could represent SBO vs ileus   Surgery team consulted and recommended CT abdomen with IV contrast  Ct abdomen possible obstruction    SBF negative   Resumed feeding and tolerating   Bowel movements some diarrhea but improving

## 2024-03-12 NOTE — PROGRESS NOTES
Patient: Arielle GUAJARDO Isidro  Location: [place] [address]  MRN:  [id number]  Today's date:  3/12/2024    Attempted to see patient for therapy. Unable due to patient refused. Due to nausea and vomiting. Concerning as pt lives alone but does have CG

## 2024-03-12 NOTE — NURSING NOTE
Pt. has not had had any nausea or vomiting since IV Zofran given at 1739. Abdomen slightly distended with positive bowel sounds. Tender to palpation.CT scan of abdomen and pelvis ordered and completed.

## 2024-03-12 NOTE — DISCHARGE SUMMARY
Internal Medicine  Inpatient Discharge Summary        BRIEF OVERVIEW   Admitting Provider: Zachariah Hoff MD  Attending Provider: Shamir Leary MD Attending phys phone: [phone]    PCP: Sara Simmons [phone]    Admission Date: 3/3/2024  Discharge Date: 3/16/2024     DISCHARGE DIAGNOSES      Primary Discharge Diagnosis  Pericardial effusion    Secondary Discharge Diagnoses  Active Hospital Problems    Diagnosis Date Noted   • Interstitial lung disease (CMS/HCC) 11/28/2022     Priority: High   • Nausea and vomiting 03/12/2024   • Hypothyroid 03/04/2024   • History of pulmonary embolism 04/14/2023   • Acute on chronic heart failure with preserved ejection fraction (CMS/HCC) 04/14/2023   • Pericardial effusion 04/13/2023   • Pulmonary hypertension (CMS/HCC) 08/09/2022   • Open wound of right foot 07/11/2022      Resolved Hospital Problems   No resolved problems to display.       Active Problem List on Day of Discharge  Patient Active Problem List   Diagnosis   • Immunocompromised state (CMS/HCC)   • Open wound of right foot   • Essential (primary) hypertension   • Hyperlipidemia, unspecified   • Raynaud's disease   • Pulmonary hypertension (CMS/HCC)   • Shortness of breath   • Interstitial lung disease (CMS/HCC)   • Scleroderma (CMS/HCC)   • Right ventricular dysfunction   • Pericardial effusion   • Hemoptysis   • History of pulmonary embolism   • Vertigo   • Debility   • Dyspnea   • Weight loss   • Acute on chronic heart failure with preserved ejection fraction (CMS/HCC)   • Anemia   • Palliative care by specialist   • Hypothyroid   • Nausea and vomiting     SUMMARY OF HOSPITALIZATION      Presenting Problem/History of Present Illness  This is a 63 y.o. year-old female admitted on 3/3/2024 with Pericardial effusion [I31.39]  Interstitial lung disease (CMS/HCC) [J84.9]  Hypoxia [R09.02].    This is a 63 y.o. female with a past medical history of cutaneous systemic scleroderma (dx 1998), pulmonary  hypertension (WHO Group 1, on mecitentan and sildenafil), ILD, Raynaud's disease, PE (3/2023, not on AC due to epistaxis and hemoptysis), GERD and HTN who presents with worsening SOB and chest pain for 24hrs. The patient stated she's been having worsening SOB and CP for the last year but it acutely worsened in the last 24 hours. She states its pressure like, over her sternum, without radiation into her arms, and she does endorse periodic palpitations and night sweats with nausea, as well as continued epistaxis and hemoptysis however these are not associated with her pain. She stated the pain is worse with exertion, and denies any fever, chills, vomiting, or diarrhea     In the ED:  Vitals: T 97.9, , RR 24, BP 138/67, SpO2 90% on RA with improvement to 96% on 1 L nasal cannula  Labs: Na 135, BG 219, AST 11, ALT 5, albumin 3.2, Tbili 0.2, lactate 1.1, troponin 12.6, WBC 8.06, Hgb 9,Plt 217, D-dimer 0.68,   UA: trace leukocyte esterase, 1+ protein, 4-10 WBCs, rare squamous epithelial cells  Imaging:  CXR: Stable cardiomegaly with diffuse chronic interstitial processes revisualized in the lungs lower lung field prominence no definite superimposed infiltrate identified  X-ray foot bilateral:  No osseous destruction to suggest osteomyelitis  CTA PE: No evidence of PE, moderate to large pericardial effusion, lower lobe lung field predominant interstitial thickening with relative subpleural sparing consistent with chronic ILD unchanged from prior study, continued bilateral axillary mediastinal and bilateral hilar lymphadenopathy not definitely changed from prior, emphysema  Interventions: 40 mg of Methylpred, 50 mg IV Benadryl  Patient was admitted to the ICU under the cardiology service for observation and was then transferred to regular floors.      Hospital course:  #Moderate to large pericardial effusion   #Pericarditis  Patient presented with increasing sub-sternal chest pressure of 1 days duration. Pain was  initially non-radiating. EKG and troponin not concerning for ACS. Endorsed GERD symptoms however no new nausea/vomiting/diarrhea. CT PE negative for PE but did show moderate to large pericardial effusion. TTE on 3/4 showed moderate circumferential pericardial effusion, mild LVH, EF 60%, grade II diastolic dysfunction, and no regional wall motion abnormalities or evidence of tamponade. BP remained stable ~130's systolic. TB panel negative. Blood cultures no growth to date. Given extensive list of allergies, allergist was consulted and recommended against using indomethacin to treat her pericarditis given history of shortness of breath with asiprin and ibuprofen. She was started on colchicine 0.6 mg BID on 3/4, and when she did not improve sympomatically prednisone 20 mg daily was added on 3/5. Patient reported new left shoulder pain during hospital stay. Multimodal pain regimen including tylenol, lidocaine patch, tramadol, hot packs used throughout stay. Patient on 3/7, developed multiple episodes of diarrhea and colchicine dose decreased to 0.3 mg. Coronary CTA done as part of the ischemic workup showed moderate 2 vessel CAD and patient recommended to begin atorvastatin 20 mg daily and plavix 75 mg daily, however, patient refused those medications.       - Continue colchicine 0.3 mg, will need prolonged course of at least 3 months  - Continue Prednisone 20 mg for a total two week course until 3/19, then decrease the dose to 10 mg daily for 2 weeks until 4/2/2024,  then decrease dose to 5 mg daily for 2 weeks until 4/16.  Will need follow up with Dr. Arvizu as outpatient for follow up in one month along with a repeat TTE in one month.      #Scleroderma w/ Raynaud's disease  #Interstitial lung disease secondary to sarcoidosis  #Pulmonary HTN  CT PE showed LLL predominant interstitial thickening with relative subpleural sparing, consistent with chronic ILD and unchanged from prior study. Also noted continued bilateral  axillary, mediastinal, and bilateral hilar LAD unchaned from prior study and possibly related to the patient's sarcoid. Patient was continued on home sildenafil and  Macitentin. She was continued on home amlodipine for Raynaud's. She will be discharged on her home pHTN medications: macitentan and sildenafil      #Acute on chronic diastolic heart failure   TTE on 3/4 showed LVH, LVEF 60%, grade II diastolic dysfunction, and no regional wall motion abnormalities.      #History of Pulmonary embolism  Not on AC due to epistaxis and hemoptysis. SCDs for DVT ppx given hemoptysis.     #Hypothyroidism  Continued home synthroid      #Foot Ulcers/ Open wound of Right foot  Known history of bilateral LE ulcers and raynauds. Foot ulcers present on arrival. Medial right and Left medial ankles and left medial great toe and tender to touch. Plain film of bilateral feet show no evidence of acute infection. Wound care consulted. Waffle boots and no sting skin barrier applied daily. No signs of infection during the admission. Patient will need to follow up with her outpatient podiatrist.      #Asymptomatic bacteruria  Urine culture grew proteus mirabilis. Patient without urinary symptoms. She was monitored off antibiotics.     #Nausea and Vomiting  Patient developed nausea and dark vomiting on 3/11 afternoon, a CT abdomen showed dilated loops of bowel that could represent SBO vs ileus, the Surgery team consulted and recommended CT abdomen with IV contrast which demonstrated possible obstruction. Patient was then made NPO and given IV fluids. Surgery then recommended a fluoroscopic small bowel follow through, which demonstrated contrast in the colon after 6 hours which ruled out obstruction. She was then started on a soft diet which she tolerated well. She had 1 episode of vomiting and diarrhea. She was advanced to a regular diet with low fiber and low residue which she tolerated well. She reports a few episodes of diarrhea and  reports her abdominal pain has improved.     Exam on Day of Discharge  Physical Exam  Neck:      Comments: JVD   Cardiovascular:      Rate and Rhythm: Normal rate and regular rhythm.      Pulses: Normal pulses.      Heart sounds: Normal heart sounds.   Pulmonary:      Effort: Pulmonary effort is normal.      Breath sounds: Normal breath sounds.   Abdominal:      Comments: Tender abdomen    Musculoskeletal:      Right lower leg: No edema.      Left lower leg: No edema.   Neurological:      Mental Status: She is alert. Mental status is at baseline.         Consults During Admission  IP CONSULT TO NUTRITION SERVICES  IP CONSULT TO WOUND OSTOMY CONTINENCE  IP CONSULT TO ALLERGY  IP CONSULT TO NUTRITION SERVICES  IP CONSULT TO GENERAL SURGERY    DISCHARGE MEDICATIONS   New, changed, or stopped medications from this admission:       Medication List      START taking these medications    colchicine 0.6 mg tablet  Commonly known as: COLCRYS  Take 0.5 tablets (0.3 mg total) by mouth daily.  Dose: 0.3 mg     loperamide 2 mg capsule  Commonly known as: IMODIUM  Take 1 capsule (2 mg total) by mouth 3 (three) times a day as needed for diarrhea for up to 10 days.  Dose: 2 mg     pantoprazole 20 mg EC tablet  Commonly known as: PROTONIX  Take 1 tablet (20 mg total) by mouth daily.  Dose: 20 mg        CHANGE how you take these medications    predniSONE 20 mg tablet  Commonly known as: DELTASONE  Take 1 tablet (20 mg total) by mouth daily for 8 days.  Dose: 20 mg  What changed:   · medication strength  · how much to take  · when to take this  · additional instructions        CONTINUE taking these medications    * amLODIPine 5 mg tablet  Commonly known as: NORVASC  Take 10 mg by mouth every morning.  Dose: 10 mg     * amLODIPine 5 mg tablet  Commonly known as: NORVASC  Take 5-10 mg by mouth nightly as needed (for high BP or Raynauds phenomenon of the fingers).  Dose: 5-10 mg     biotin 1 mg tablet  Take 1,000 mcg by mouth  daily.  Dose: 1,000 mcg     cholecalciferol (vitamin D3) 1,000 unit (25 mcg) tablet  Take 1,000 Units by mouth daily.  Dose: 1,000 Units     cyanocobalamin (vitamin B-12) 1,000 mcg capsule  Take 1,000 mcg by mouth daily.  Dose: 1,000 mcg     docusate sodium 100 mg capsule  Commonly known as: COLACE  Take 100 mg by mouth daily.  Dose: 100 mg     fluticasone-umeclidinium-vilanterol 100-62.5-25 mcg blister with device powder for inhalation  Commonly known as: TRELEGY ELLIPTA  Inhale 1 puff daily.  Dose: 1 puff     levothyroxine 100 mcg tablet  Commonly known as: SYNTHROID  Take 100 mcg by mouth daily.  Dose: 100 mcg     macitentan 10 mg tablet tablet  Commonly known as: OPSUMIT  Take 1 tablet (10 mg total) by mouth daily.  Dose: 10 mg     meclizine 25 mg tablet  Commonly known as: ANTIVERT  Take 25 mg by mouth daily as needed.  Dose: 25 mg     mupirocin 2 % ointment  Commonly known as: BACTROBAN  Apply 1 application. topically daily. Behind ears  Dose: 1 application.     sildenafiL (pulm.hypertension) 20 mg tablet  Commonly known as: REVATIO  Take 1 tablet (20 mg total) by mouth 3 (three) times a day.  Dose: 20 mg         * This list has 2 medication(s) that are the same as other medications prescribed for you. Read the directions carefully, and ask your doctor or other care provider to review them with you.            STOP taking these medications    ACTEMRA IV            Non-Medication orders at discharge:   Instructions for after discharge     Call provider for:  difficulty breathing, headache or visual disturbances      Call provider for:  extreme fatigue      Call provider for:  persistent dizziness or light-headedness      Call provider for:  persistent nausea or vomiting      Call provider for:  rash      Call provider for:  redness, tenderness, or signs of infection (pain, swelling, redness, odor or green/yellow discharge around incision site)      Call provider for:  severe uncontrolled pain      Call provider  for:  temperature >100.4      Discharge diet      Diet Type / Texture: Regular    Follow-up with Provider:      Timothy Arvizu,    [phone]    [address]  [facility] [place]  [place] [address]       Follow-up with Provider:      Phani Giraldo MD   [phone]    RHEUMATOLOGY [facility]  [facility] [address], [place]  [place] [address]       Follow-up with Provider:      Floridalma Trevizo DO   [phone]    [address]  [address]  [place] [address]       Follow-up with Provider:      Kai Wasserman MD   [phone]    [address]  [place]  [place] [address]       Follow-up with primary physician (PCP)      Post-Discharge Activity: Normal activity as tolerated.      Normal activity as tolerated.                    PROCEDURES / LABS / IMAGING          Pertinent Imaging  CT ABDOMEN PELVIS WITHOUT IV CONTRAST    Result Date: 3/11/2024  IMPRESSION: Patchy opacities in the lower lungs, partially improved compared to prior CT 3/3/2024.  Recommend continued follow-up. Moderate to large pericardial effusion, similar to prior. Mildly dilated loops of small bowel, without definite discrete transition point. Possible early obstruction versus ileus. If symptoms persist, recommend repeat CT abdomen and pelvis with intravenous and oral contrast.     CT CHEST WITHOUT IV CONTRAST    Result Date: 3/11/2024  IMPRESSION: Patchy opacities in the lower lungs, partially improved compared to prior CT 3/3/2024.  Recommend continued follow-up. Moderate to large pericardial effusion, similar to prior. Mildly dilated loops of small bowel, without definite discrete transition point. Possible early obstruction versus ileus. If symptoms persist, recommend repeat CT abdomen and pelvis with intravenous and oral contrast.     X-RAY CHEST 1 VIEW    Result Date: 3/11/2024  IMPRESSION: Esophageal perforation not excluded on a radiograph examination. CT chest with luminal contrast, esophagram  protocol, can be considered. COMMENT:Frontal portable semierect view of the chest performed. Cardiomegaly. Atheromatous and atherosclerotic changes of the imaged portions of the thoracic aorta. Lungs are clear. Comparison made with March 3, 2024 examination. No evidence of a pneumomediastinum within the limitations of this semierect examination.    X-RAY HAND LEFT 2 VIEWS    Result Date: 3/10/2024  IMPRESSION: No fracture, limited.    CTA CORONARY ARTERY WITH IV CONTRAST    Result Date: 3/8/2024  IMPRESSION: 1. See above cardiologist summary. 2. Noncardiac portions of the examination were significant for bilateral emphysematous changes and bronchiectasis. Enhancing focus in the spleen likely representing a hemangioma. Significant pericardial effusion.. CT chest interpretation by Marco Antonio Butler M.D. on 3/7/2024.     X-RAY FOOT BILATERAL 3+VW    Result Date: 3/4/2024  IMPRESSION: No osseous destruction to suggest osteomyelitis.     CT ANGIOGRAPHY CHEST PULMONARY EMBOLISM WITH IV CONTRAST    Result Date: 3/3/2024  IMPRESSION: 1.  No evidence of acute pulmonary embolism to the level of the segmental branches. 2.  Moderate to large pericardial effusion measuring higher than simple fluid density. 3.  Lower lobe lung field predominant interstitial thickening with some relative subpleural sparing, consistent with a chronic interstitial lung disease, unchanged from the prior study. 4.  Continued bilateral axillary, mediastinal, and bilateral hilar lymphadenopathy, not definitely changed from the prior study, possibly related to the patient's sarcoid. 5.  Emphysema.     X-RAY CHEST 2 VIEWS    Result Date: 3/3/2024  IMPRESSION: 1.  Stable cardiomegaly. 2.  Diffuse chronic interstitial process revisualized in the lungs with a lower lung field predominance, not definitely changed. No definite superimposed infiltrate identified.       OUTPATIENT  FOLLOW-UP / REFERRALS / PENDING TESTS      Outpatient Follow-Up Appointments             In 1 week Dee Frank MD [facility] Internal Medicine    In 3 months [facility] Card[facility]    In 3 months [facility]    In 3 months Timothy Arvizu DO [facility] at [facility]          Referrals  No orders of the defined types were placed in this encounter.      Test Results Pending at Discharge  Follow up with new PCP Dr. Frank on 3/25/24    Follow up with with Dr Arvizu on 4/18 and TTE in one month   Follow up with rheumatology in one month   Follow up with pulmonology     DISCHARGE DISPOSITION      Disposition: Home Health Care - Other    Code Status At Discharge: Full Code    Physician Order for Life-Sustaining Treatment Document Status      No documents found                 ATTENDING DOCUMENTATION  ALSO SEE ATTENDING ATTESTATION SECTION OF NOTE   I saw and evaluated the patient.  I discussed the case with the Resident and agree with the findings and plan as documented in the note except for my comments below or within the additional notes today.     Admitted for pericarditis likely 2/2 autoimmune disease, started on prednisone and colchicine, course c/b diarrhea from colchicine and then possible SBO, subsequent SBFT negative for obstruction, resumed on diet which she is tolerating, colchicine dose reduced with improvement in diarrhea, able to maintain adequate hydration PO, stable for dc home on slow prednisone taper, colchicine, and close f/u cardiology, rheumatology.     Luis Gomez MD  3/16/2024 2:10 PM

## 2024-03-12 NOTE — PLAN OF CARE
Care Coordination Discharge Plan Note     Discharge Needs Assessment  Concerns to be Addressed: discharge planning  Current Discharge Risk: chronically ill, physical impairment    Anticipated Discharge Plan  Anticipated Discharge Disposition: home with home health  Type of Home Care Services: nursing, home PT, home OT        Patient Choice  Offered/Gave Vendor List: yes  Patient's Choice of Community Agency(s): [facility]     Patient and/or patient guardian/advocate was made aware of their right to choose a provider. A list of eligible providers was presented and reviewed with the patient and/or patient guardian/advocate in written and/or verbal form. The list delineates providers in the patient’s desired geographic area who are participating in the Medicare program and/or providers contracted with the patient’s primary insurance. The Medicare list and quality ratings were obtained from the Medicare.gov [medicare.gov] website.    ---------------------------------------------------------------------------------------------------------------------    Interdisciplinary Discharge Plan Review:  Participants:nursing, physical therapy, , occupational therapy, physician, dietitian/nutrition services, patient, social work/services    Concerns Comments: Per DPR, patient was ready for discharge yesterday, but then had nausea and vomitting and was kept overnight. Patient was sent to CT of the abd, radiologist requesting contrast. Patient will have have  a dye prep prior. Patient is agreeable to the dispo plan. CC will continue to follow for transition of care needs until discharge is complete.    Discharge Plan:   Disposition/Destination: Home Health Care - Other / Home  Discharge Facility:    Community Resources:      Discharge Transportation:  Is Out of Hospital DNR needed at Discharge: no  Does patient need discharge transport? No

## 2024-03-12 NOTE — NURSING NOTE
Pt VSS. Pt AAOx4. Pt c/o abdominal pain. PO tylenol given. PO Senokot and suppository given. x2 small Bowel movements overnight. Iv zofran given once for nausea. FSP in place. Call bell in reach and bed alarm activated. Surgery consulted.

## 2024-03-12 NOTE — PROGRESS NOTES
"Nutrition Note    Recommendations:      -ADAT -no therapeutic diet and encourage PO intake >75% of each meal, as tolerated.   -As able. Please order Ensure Plus 1/day, Boost Chocolate 1/day, Boost Vanilla 1/day  -MVI with minerals.     Clinical Course: Patient is a 63 y.o. female who was admitted on 3/3/2024 with a diagnosis of Pericardial effusion [I31.39]  Interstitial lung disease (CMS/HCC) [J84.9]  Hypoxia [R09.02].     Past Medical History:   Diagnosis Date   • De Quervain's tenosynovitis    • Essential (primary) hypertension    • Follicular carcinoma of thyroid (CMS/HCC)    • Gastro-esophageal reflux    • Hand ulceration (CMS/HCC)    • Hyperlipidemia, unspecified    • Interstitial lung disease (CMS/HCC)    • Leg ulcer (CMS/HCC)    • Lung nodule    • Lupus (CMS/HCC)    • Osteomyelitis of hand (CMS/HCC)    • Osteoporosis    • Pulmonary hypertension (CMS/HCC)    • Raynaud's disease     Severe   • Reflux esophagitis    • Scleroderma (CMS/HCC)    • Screening mammogram for breast cancer 05/04/2021    BI-RADS category: 1 - Negative (care everywhere @ [facility])     Past Surgical History:   Procedure Laterality Date   • CARDIAC CATHETERIZATION     • COLONOSCOPY     • HERNIA REPAIR       MST Nutrition Screen Tool  Has patient lost weight without trying?: 2-->Yes, 14-23 lbs  Has patient been eating poorly due to decreased appetite?: 1-->Yes  MST Nutrition Screen Score: 3    Nutrition/Diet History  Intake (%): 75%    RETS18 Physical Appearance  Overall Physical Appearance: loss of muscle mass, loss of subcutaneous fat, generalized wasting  Last Bowel Movement: 03/12/24  Skin: pressure injury (L toe-U, L ankle stage 2, R ankle stage 2)    Nutrition Order  Nutrition Order: does not meet nutritional requirements  Nutrition Order Comments: NPO    Anthropometrics  Height: 167.6 cm (5' 6\")    Current Weight  Weight Method: Bed scale  Weight: 46.4 kg (102 lb 4.8 oz)    Ideal Body Weight (IBW)  Ideal Body Weight (IBW) (kg): 59.58  % " Ideal Body Weight: 79.94    Body Mass Index (BMI)  BMI (Calculated): 16.5    Labs/Procedures/Meds  Lab Results Reviewed: reviewed, pertinent  Lab Results   Component Value Date    GLUCOSE 77 03/12/2024    CALCIUM 8.9 03/12/2024     03/12/2024    K 4.7 03/12/2024    CO2 22 03/12/2024     03/12/2024    BUN 23 03/12/2024    CREATININE 0.9 03/12/2024       Medications  Pertinent Medications Reviewed: reviewed, pertinent  • amLODIPine  10 mg oral q AM   • atorvastatin  20 mg oral Daily (6p)   • [Provider Managed Hold] bisacodyL  10 mg rectal Daily   • cholecalciferol (vitamin D3)  1,000 Units oral Daily   • clopidogreL  75 mg oral Daily   • colchicine  0.3 mg oral Daily   • cyanocobalamin  100 mcg oral Daily   • levothyroxine  100 mcg oral Daily   • lidocaine  1 patch Topical Daily   • mirtazapine  7.5 mg oral Nightly   • mometasone-formoterol  2 puff inhalation BID (8a, 8p)    And   • tiotropium bromide  2 puff inhalation Daily (8a)   • mupirocin  1 Application Topical Daily   • pantoprazole  40 mg intravenous BID   • [Provider Managed Hold] predniSONE  20 mg oral Daily   • macitentan  10 mg oral Daily   • [Provider Managed Hold] senna  2 tablet oral Nightly   • sildenafiL (pulm.hypertension)  20 mg oral TID       Estimated/Assessed Needs  Additional Documentation: Calorie Requirements (Group), Protein Requirements (Group)    Calorie Requirements  Estimated kCal Needs: Actual Body Weight (48kg)  Estimated Calorie Need Method: kcal/kg  Calorie/kg Recommended: 30-35  Calorie Recommendations: 6990-2789    [place]-St. Jeor Equation  RMR ([place]-St. Jeor Equation): 1035.78    Protein Requirements  Recommended Dosing Weight (Estimated Protein Needs): Actual Body Weight  Est Protein Requirement Amount (gms/kg): 1.5-->1.5 gm protein  Protein Recommendations: 72    Fluid Requirements  Jacy-Segar Method (over 20 kg): 2428.06    Clinical Comments:   Pt seen for nutrition follow up. Was eating well with 75% PO  intake on regular diet + ONS. Pt noted to have been ready for d/c and then began vomiting dark brown liquid. Now NPO and c/f possible obstruction vs ileus -surgery consulted.   Pt reports she is hungry and concerned about NPO status 2/2 not wanting to lose more weight. Prefers ONS TID as diet order is placed. Pt has no additional questions/concerns regarding nutrition at this time.   Will continue to monitor and reassess per protocol unless otherwise consulted.     PES  Statement: PES Statement  Nutrition Diagnosis: Inadequate Protein-Energy Intake  Related To:: Decreased ability to consume sufficient energy  As Evidenced By:: NPO  Nutritional Needs Met?: No, Stays the same    Goal: PO intake >75% of nutrient needs.  Monitor: sternocleidomastoid muscle      Date: 03/12/24  Signature: BECCA Matthews

## 2024-03-12 NOTE — PROGRESS NOTES
Internal Medicine  Daily Progress Note       SUBJECTIVE   This is a 63 y.o. year-old female admitted on 3/3/2024 with Pericardial effusion [I31.39]  Interstitial lung disease (CMS/HCC) [J84.9]  Hypoxia [R09.02].    Interval History:   Patient yesterday had an episode of nausea followed by several episodes of dark vomiting. She then reported abdominal pain throughout the night. She had one small bowel movement overnight. CT abdomen showed possible obstruction vs Ileus and surgery will assess patient this morning.      OBJECTIVE   Vital signs in last 24 hours:  Temp:  [36.7 °C (98 °F)-36.8 °C (98.3 °F)] 36.7 °C (98.1 °F)  Heart Rate:  [] 71  Resp:  [17-18] 18  BP: (132-155)/(66-72) 148/69  SpO2:  [92 %-96 %] 96 %  Oxygen Therapy: None (Room air)    Weight & I/Os:  Weights (last 5 days)     Date/Time Weight    03/12/24 0611 46.4 kg (102 lb 4.8 oz)    03/11/24 0539 46.5 kg (102 lb 8.2 oz)    03/10/24 0555 44.8 kg (98 lb 12.8 oz)    03/09/24 0608 42 kg (92 lb 9.5 oz)    03/08/24 0533 45.4 kg (100 lb 1.6 oz)    03/07/24 0700 46.1 kg (101 lb 11.2 oz)          Intake/Output Summary (Last 24 hours) at 3/12/2024 0659  Last data filed at 3/12/2024 0430  24 Hour Net Input/Output from 7AM Yesterday   Intake 0 ml   Output 525 ml   Net -525 ml        PHYSICAL EXAMINATION   Physical Exam  Neck:      Comments: JVD   Cardiovascular:      Rate and Rhythm: Normal rate and regular rhythm.      Pulses: Normal pulses.      Heart sounds: Normal heart sounds.   Pulmonary:      Effort: Pulmonary effort is normal.      Breath sounds: Normal breath sounds.   Abdominal:      Comments: Distended and diffusely tender abdomen    Musculoskeletal:      Right lower leg: No edema.      Left lower leg: No edema.   Neurological:      Mental Status: She is alert and oriented to person, place, and time. Mental status is at baseline.          LINES, CATHETERS, DRAINS, AIRWAYS, & WOUNDS   Lines, drains, airways, & wounds:  Peripheral IV (Adult)  03/11/24 Left;Posterior Forearm (Active)   Number of days: 1       Wound Other (comment) Anterior;Left Toe (Great) (Active)   Number of days: 8       Wound Pressure Injury Anterior;Left Ankle (Active)   Number of days: 8       Wound Pressure Injury Anterior;Right Ankle (Active)   Number of days: 8       Wound Other (comment) Anterior;Right Toe (2nd) (Active)   Number of days: 8        LABS, IMAGING, & TELE   Labs:  I have reviewed the patient's pertinent labs. Pertinent labs are within normal limits.    Results from last 7 days   Lab Units 03/12/24  0723 03/11/24  1730 03/11/24  0629   WBC K/uL 12.67* 15.64* 10.69*   HEMOGLOBIN g/dL 9.8* 10.9* 10.6*   HEMATOCRIT % 31.8* 35.4 35.4   PLATELETS K/uL 287 327 311       Results from last 7 days   Lab Units 03/11/24  1730 03/11/24  0628 03/10/24  0541   SODIUM mEQ/L 136 139 136   POTASSIUM mEQ/L 5.4* 4.5 3.7   CHLORIDE mEQ/L 105 109* 106   CO2 mEQ/L 23 21 22   BUN mg/dL 24 24 21   CREATININE mg/dL 0.9 0.9 0.8   CALCIUM mg/dL 9.1 9.0 8.6   ALBUMIN g/dL 3.5 3.3* 3.0*   BILIRUBIN TOTAL mg/dL 0.3 0.3 0.2*   ALK PHOS IU/L 58 55 55   ALT IU/L 17 10 9   AST IU/L 25 12* 11*   GLUCOSE mg/dL 128* 67* 74     Imaging personally reviewed (does not include unread studies):  CT ABDOMEN PELVIS WITHOUT IV CONTRAST    Result Date: 3/11/2024  CLINICAL HISTORY: leukocytosis with new vomiting TECHNIQUE: CT chest: Helical acquisition with axial reconstructions without intravenous contrast. Coronal and sagittal reformats created. CT abdomen and pelvis: Helical acquisition without intravenous or oral contrast. Coronal and sagittal reconstructions also performed. CT DOSE:  One or more dose reduction techniques (e.g. automated exposure control, adjustment of the mA and/or kV according to patient size, use of iterative reconstruction technique) utilized for this examination COMPARISON: Chest radiograph earlier same date CT chest 3/3/2024 CT abdomen and pelvis 11/27/2020 FINDINGS: Evaluation  compromised without intravenous contrast. CHEST: -Lungs And Large Airways:Emphysema.  Patchy opacities lower lungs, slightly improved. -Miriam/Mediastinum/Axilla: Mildly enlarged nodes, slightly improved compared to prior. -Pleura: No pleural effusion or nodule. No pneumothorax. -Vascular: No aortic aneurysm or any significant vascular finding. -Chest Wall And Lower Neck: Unremarkable. -Bones: No acute findings. -Heart: Enlarged. Moderate to large pericardial effusion, similar to prior. ABDOMEN: -Liver: No mass or any significant abnormality. -Gallbladder: Cholelithiasis. -Bile Ducts: No significant biliary ductal dilatation. -Spleen:  No splenomegaly or focal lesion. -Pancreas: No mass, ductal dilatation, or inflammatory changes. -Kidneys: No solid mass or hydronephrosis, or any definite calculi. -Adrenals:  No nodules. -Lymph Nodes: No adenopathy. -Vascular: No aortic aneurysm or any significant vascular abnormalities. -Abdominal Wall: No hernia, fluid collection, or any significant findings. -Bones: No acute findings BOWEL/MESENTERY: Mildly dilated loops of small bowel, measures up to approximately 3 cm.  No discrete transition point appreciated. Large bowel normal in caliber. Appendix normal. No ascites.  No collection.  No free air. PELVIS: -Bladder: Unremarkable -Reproductive Organs:No gross mass -Lymph Nodes: No mass or lymphadenopathy. -Miscellaneous: No free fluid     IMPRESSION: Patchy opacities in the lower lungs, partially improved compared to prior CT 3/3/2024.  Recommend continued follow-up. Moderate to large pericardial effusion, similar to prior. Mildly dilated loops of small bowel, without definite discrete transition point. Possible early obstruction versus ileus. If symptoms persist, recommend repeat CT abdomen and pelvis with intravenous and oral contrast.     CT CHEST WITHOUT IV CONTRAST    Result Date: 3/11/2024  CLINICAL HISTORY: leukocytosis with new vomiting TECHNIQUE: CT chest: Helical  acquisition with axial reconstructions without intravenous contrast. Coronal and sagittal reformats created. CT abdomen and pelvis: Helical acquisition without intravenous or oral contrast. Coronal and sagittal reconstructions also performed. CT DOSE:  One or more dose reduction techniques (e.g. automated exposure control, adjustment of the mA and/or kV according to patient size, use of iterative reconstruction technique) utilized for this examination COMPARISON: Chest radiograph earlier same date CT chest 3/3/2024 CT abdomen and pelvis 11/27/2020 FINDINGS: Evaluation compromised without intravenous contrast. CHEST: -Lungs And Large Airways:Emphysema.  Patchy opacities lower lungs, slightly improved. -Miriam/Mediastinum/Axilla: Mildly enlarged nodes, slightly improved compared to prior. -Pleura: No pleural effusion or nodule. No pneumothorax. -Vascular: No aortic aneurysm or any significant vascular finding. -Chest Wall And Lower Neck: Unremarkable. -Bones: No acute findings. -Heart: Enlarged. Moderate to large pericardial effusion, similar to prior. ABDOMEN: -Liver: No mass or any significant abnormality. -Gallbladder: Cholelithiasis. -Bile Ducts: No significant biliary ductal dilatation. -Spleen:  No splenomegaly or focal lesion. -Pancreas: No mass, ductal dilatation, or inflammatory changes. -Kidneys: No solid mass or hydronephrosis, or any definite calculi. -Adrenals:  No nodules. -Lymph Nodes: No adenopathy. -Vascular: No aortic aneurysm or any significant vascular abnormalities. -Abdominal Wall: No hernia, fluid collection, or any significant findings. -Bones: No acute findings BOWEL/MESENTERY: Mildly dilated loops of small bowel, measures up to approximately 3 cm.  No discrete transition point appreciated. Large bowel normal in caliber. Appendix normal. No ascites.  No collection.  No free air. PELVIS: -Bladder: Unremarkable -Reproductive Organs:No gross mass -Lymph Nodes: No mass or lymphadenopathy.  -Miscellaneous: No free fluid     IMPRESSION: Patchy opacities in the lower lungs, partially improved compared to prior CT 3/3/2024.  Recommend continued follow-up. Moderate to large pericardial effusion, similar to prior. Mildly dilated loops of small bowel, without definite discrete transition point. Possible early obstruction versus ileus. If symptoms persist, recommend repeat CT abdomen and pelvis with intravenous and oral contrast.     X-RAY CHEST 1 VIEW    Result Date: 3/11/2024  CLINICAL HISTORY: rule out esophageal perf     IMPRESSION: Esophageal perforation not excluded on a radiograph examination. CT chest with luminal contrast, esophagram protocol, can be considered. COMMENT:Frontal portable semierect view of the chest performed. Cardiomegaly. Atheromatous and atherosclerotic changes of the imaged portions of the thoracic aorta. Lungs are clear. Comparison made with March 3, 2024 examination. No evidence of a pneumomediastinum within the limitations of this semierect examination.     ASSESSMENT & PLAN   Nausea and vomiting  Assessment & Plan  Patient developed nausea and dark vomiting on 3/11 afternoon   CT abdomen showed dilated loops of bowel that could represent SBO vs ileus   Surgery team consulted and will assess     Interstitial lung disease (CMS/HCC)  Assessment & Plan  Patient has a PMH of ILD, CTEPH and phtn on sildenafil, mecetentan, and scleroderma   Has been discussing Tocilizumab with rheumatology but hasn't started it yet   Echo: EF 60%, No regional wall abnormalities, Grade II diastolic dysfunction  Continue pain meds as able     Impression: confirmed ILD and precap phtn 2/2 scleroderma, pw worsening SHOB, cp, and new pericardial effusion     - continue home meds   - cont PTA amlodipine   - continue phtn meds as able   - macitanetan and sildenafil       Pulmonary hypertension (CMS/HCC)  Assessment & Plan  Patient has a pMH of pulmonary htn, scleroderma, CTEPh and precapillary pHTN   Sees  Dr. Arvizu   Multiple echos and RHC and LHC   Endorses continued worsening SHOB since 10/2022 with BOWSER, palpitations, bendopnea   3/4: Echo: EF 60%, No regional wall abnormalities, Grade II diastolic dysfunction  Most recent echo 6/2023 showed EF 60%, mild TR, RVSP 55, increased RV systolic pressures compared to April   Had a PE in march 2023, was on elequis however had hemoptysis and it was discontinued   VQ in sept 2023 showed mod-high prob of PE disease and CTA PE showed no acute filling defect but enlargmeent of PA cw PHTN    recently started mecitentan aug/sept 2023 wo much subjective change     Ddx: multifactorial phtn scleroderma ILD vs. CTEPH vs. Pericardial effusion vs. Combo thereof     Impression: 62yo lady with PMH of raynauds, phtn, cutaneous scleroderma, ILD, CTEPh pw worsening SHOB x 24 hours with new pericardial effusion seen on CT. Prior echos have shown increasing RVSP and worsening phtn, for which she is on sildenafil, mecitentan.     - continue sildenafil   - continue mecitentan   - cards on board   - closely monitor BP in ICU   - SCDs for DVT given hemoptysis            * Pericardial effusion  Assessment & Plan  Ddx: autoimmune vs. Myocardial injury vs. Bacterial infection vs. Viral vs. Malignant   Patient has a PMH of phtn, scleroderma, CTEPH   pw increasing chest pressure x 1 day, without radiation into arms  Endorses continued GERD sxs however no new NVD  S/p Methylprednisolone x1 in ED   CT shows pericardial effusion   Echo: EF 60%, No regional wall abnormalities, Grade II diastolic dysfunction  ; Trop 12.9   [id number] and CRP 8.9   MRSA Nares: Negative  TB panel negative 2/27   BCx: NGTD      - stated colchicine but dropping dose due to diarrhea to colchicine 0.3. cdiff pending  - start low dose prednisone 20mg daily (as indomethacin alternative, pt reports allerges to ASA and Ibuprofen)   - coronary CTA showed moderate 2 vessel CAD. Recommend plavix and statin but patient refusing  medications.   - CMV qual   - TB panel 2/27 negative   - coxsackie FU qual   - CBC daily  - CMP daily   - scds for dvt ppx given hemooptysis   - Allergist recommendations appreciated  - VS frequently       Hypothyroid  Assessment & Plan  Patient has a PMH of hypothyroidism 2/2 thyroidectomy for follicular carcinoma of the thyroid     Note H/o Total Thyroidectomy (Dr. Pacheco at [facility]; 4/2013)  - continue home thyroid medications Synthroid 100mcg qd    Acute on chronic heart failure with preserved ejection fraction (CMS/HCC)  Assessment & Plan  Patient has a PMH of HFPEF with EF 65%   Endorsing worsening HSOB, denies any ANGEL LUIS, UFD or medication complaince difficulty   CT shows pericardial effusion without pleural effusions or pulmonary edema   Exam negative for ANGEL LUIS, crackles     Impression: 64 yo lady with PMH of scleroderma and PE, phtn on mecitentan and sildenafil pw worseing BOWSER and shob with new pericardial effusion on ct imaging, negative for pulmonary edema or other s/sxs of CHF exacerbation. Unlikely this is a CHF exacerbation.     - continue to monitor   - continue home medications     History of pulmonary embolism  Assessment & Plan  Patient has a PMH of phtn and PE tx w/ apixiban however cb epistaxis and hemoptysis   Stopped elequis in June 2023   pw worsening shob and new pericardial effusion   Continues to endorse hemoptysis and epistaxis worsening over last few days   Echo EF 60%, no regional wall motion abnormalities, grade II diastolic dysfunction    - scds for now for dvt ppx       Open wound of right foot  Assessment & Plan  Patient has multiple wounds noted on her feet bilaterally   Wound images from admission in the media tab   Is on CCB at home   States wounds have difficulty healing  Also now with poor nutritional status iso scleroderma and ILD     Ddx: nonhealing ulcers 2/2 autoimmune disease vs. Poor nutritional status     Impression: 64yo lady with scleroderma causing phtn, ild, chronic extremity  nonhealing ulcers sp mutliple amputations, raynauds pw shob and new pericardial effusion. Has chronic nonhealing ulcers to her feet, most likely secondary to her scleroderma and poor nutritional status     - cont CCB  - wound ostomy on board   - consider pain management   - nutrition consult        VTE Assessment: Padua    VTE Prophylaxis: Current anticoagulants:    •None      Code Status: Full Code  Estimated discharge date: 3/11/2024         ATTENDING DOCUMENTATION  ALSO SEE ATTENDING ATTESTATION SECTION OF NOTE

## 2024-03-13 ENCOUNTER — APPOINTMENT (INPATIENT)
Dept: RADIOLOGY | Facility: HOSPITAL | Age: 64
DRG: 315 | End: 2024-03-13
Attending: STUDENT IN AN ORGANIZED HEALTH CARE EDUCATION/TRAINING PROGRAM
Payer: COMMERCIAL

## 2024-03-13 LAB
ALBUMIN SERPL-MCNC: 3 G/DL (ref 3.5–5.7)
ALP SERPL-CCNC: 49 IU/L (ref 34–125)
ALT SERPL-CCNC: 15 IU/L (ref 7–52)
ANION GAP SERPL CALC-SCNC: 9 MEQ/L (ref 3–15)
AST SERPL-CCNC: 12 IU/L (ref 13–39)
BILIRUB SERPL-MCNC: 0.3 MG/DL (ref 0.3–1.2)
BUN SERPL-MCNC: 24 MG/DL (ref 7–25)
CALCIUM SERPL-MCNC: 9 MG/DL (ref 8.6–10.3)
CHLORIDE SERPL-SCNC: 107 MEQ/L (ref 98–107)
CO2 SERPL-SCNC: 22 MEQ/L (ref 21–31)
CREAT SERPL-MCNC: 0.9 MG/DL (ref 0.6–1.2)
EGFRCR SERPLBLD CKD-EPI 2021: >60 ML/MIN/1.73M*2
ERYTHROCYTE [DISTWIDTH] IN BLOOD BY AUTOMATED COUNT: 19.5 % (ref 11.7–14.4)
GLUCOSE SERPL-MCNC: 95 MG/DL (ref 70–99)
HCT VFR BLD AUTO: 29.6 % (ref 35–45)
HGB BLD-MCNC: 9.1 G/DL (ref 11.8–15.7)
MCH RBC QN AUTO: 26.5 PG (ref 28–33.2)
MCHC RBC AUTO-ENTMCNC: 30.7 G/DL (ref 32.2–35.5)
MCV RBC AUTO: 86 FL (ref 83–98)
PDW BLD AUTO: 10.9 FL (ref 9.4–12.3)
PLATELET # BLD AUTO: 290 K/UL (ref 150–369)
POTASSIUM SERPL-SCNC: 4.6 MEQ/L (ref 3.5–5.1)
PROT SERPL-MCNC: 6.5 G/DL (ref 6–8.2)
RBC # BLD AUTO: 3.44 M/UL (ref 3.93–5.22)
SODIUM SERPL-SCNC: 138 MEQ/L (ref 136–145)
WBC # BLD AUTO: 9.3 K/UL (ref 3.8–10.5)

## 2024-03-13 PROCEDURE — 99232 SBSQ HOSP IP/OBS MODERATE 35: CPT | Performed by: INTERNAL MEDICINE

## 2024-03-13 PROCEDURE — 63600105 HC IODINE BASED CONTRAST: Mod: JW | Performed by: STUDENT IN AN ORGANIZED HEALTH CARE EDUCATION/TRAINING PROGRAM

## 2024-03-13 PROCEDURE — 63700000 HC SELF-ADMINISTRABLE DRUG

## 2024-03-13 PROCEDURE — 25000000 HC PHARMACY GENERAL

## 2024-03-13 PROCEDURE — 21400000 HC ROOM AND CARE CCU/INTERMEDIATE

## 2024-03-13 PROCEDURE — 36415 COLL VENOUS BLD VENIPUNCTURE: CPT

## 2024-03-13 PROCEDURE — 99231 SBSQ HOSP IP/OBS SF/LOW 25: CPT | Performed by: SURGERY

## 2024-03-13 PROCEDURE — 63700000 HC SELF-ADMINISTRABLE DRUG: Performed by: STUDENT IN AN ORGANIZED HEALTH CARE EDUCATION/TRAINING PROGRAM

## 2024-03-13 PROCEDURE — 85027 COMPLETE CBC AUTOMATED: CPT

## 2024-03-13 PROCEDURE — 63600000 HC DRUGS/DETAIL CODE: Mod: JZ

## 2024-03-13 PROCEDURE — 99233 SBSQ HOSP IP/OBS HIGH 50: CPT | Performed by: HOSPITALIST

## 2024-03-13 PROCEDURE — 80053 COMPREHEN METABOLIC PANEL: CPT

## 2024-03-13 PROCEDURE — 74174 CTA ABD&PLVS W/CONTRAST: CPT

## 2024-03-13 RX ORDER — PANTOPRAZOLE SODIUM 40 MG/10ML
40 INJECTION, POWDER, LYOPHILIZED, FOR SOLUTION INTRAVENOUS EVERY 24 HOURS
Status: DISCONTINUED | OUTPATIENT
Start: 2024-03-14 | End: 2024-03-16 | Stop reason: HOSPADM

## 2024-03-13 RX ORDER — PREDNISONE 50 MG/1
50 TABLET ORAL
Status: COMPLETED | OUTPATIENT
Start: 2024-03-13 | End: 2024-03-14

## 2024-03-13 RX ORDER — IOPAMIDOL 755 MG/ML
80 INJECTION, SOLUTION INTRAVASCULAR
Status: COMPLETED | OUTPATIENT
Start: 2024-03-13 | End: 2024-03-13

## 2024-03-13 RX ORDER — DIPHENHYDRAMINE HCL 25 MG
50 CAPSULE ORAL ONCE
Status: COMPLETED | OUTPATIENT
Start: 2024-03-14 | End: 2024-03-14

## 2024-03-13 RX ORDER — SODIUM CHLORIDE, SODIUM LACTATE, POTASSIUM CHLORIDE, CALCIUM CHLORIDE 600; 310; 30; 20 MG/100ML; MG/100ML; MG/100ML; MG/100ML
INJECTION, SOLUTION INTRAVENOUS CONTINUOUS
Status: ACTIVE | OUTPATIENT
Start: 2024-03-13 | End: 2024-03-14

## 2024-03-13 RX ADMIN — ATORVASTATIN CALCIUM 20 MG: 20 TABLET, FILM COATED ORAL at 17:27

## 2024-03-13 RX ADMIN — SILDENAFIL 20 MG: 20 TABLET, FILM COATED ORAL at 08:07

## 2024-03-13 RX ADMIN — IOPAMIDOL 80 ML: 755 INJECTION, SOLUTION INTRAVENOUS at 00:40

## 2024-03-13 RX ADMIN — SODIUM CHLORIDE, POTASSIUM CHLORIDE, SODIUM LACTATE AND CALCIUM CHLORIDE: 600; 310; 30; 20 INJECTION, SOLUTION INTRAVENOUS at 14:50

## 2024-03-13 RX ADMIN — COLCHICINE 0.3 MG: 0.6 TABLET ORAL at 08:06

## 2024-03-13 RX ADMIN — LEVOTHYROXINE SODIUM 100 MCG: 0.1 TABLET ORAL at 05:45

## 2024-03-13 RX ADMIN — AMLODIPINE BESYLATE 10 MG: 10 TABLET ORAL at 08:07

## 2024-03-13 RX ADMIN — Medication 1000 UNITS: at 08:06

## 2024-03-13 RX ADMIN — SILDENAFIL 20 MG: 20 TABLET, FILM COATED ORAL at 20:02

## 2024-03-13 RX ADMIN — MOMETASONE FUROATE AND FORMOTEROL FUMARATE DIHYDRATE 2 PUFF: 100; 5 AEROSOL RESPIRATORY (INHALATION) at 20:02

## 2024-03-13 RX ADMIN — Medication 100 MCG: at 08:06

## 2024-03-13 RX ADMIN — MUPIROCIN 1 APPLICATION: 20 OINTMENT TOPICAL at 08:13

## 2024-03-13 RX ADMIN — PANTOPRAZOLE SODIUM 40 MG: 40 INJECTION, POWDER, FOR SOLUTION INTRAVENOUS at 08:06

## 2024-03-13 RX ADMIN — SILDENAFIL 20 MG: 20 TABLET, FILM COATED ORAL at 14:50

## 2024-03-13 RX ADMIN — MIRTAZAPINE 7.5 MG: 15 TABLET, FILM COATED ORAL at 20:02

## 2024-03-13 RX ADMIN — ACETAMINOPHEN 975 MG: 325 TABLET ORAL at 08:06

## 2024-03-13 RX ADMIN — PREDNISONE 50 MG: 50 TABLET ORAL at 18:46

## 2024-03-13 RX ADMIN — MOMETASONE FUROATE AND FORMOTEROL FUMARATE DIHYDRATE 2 PUFF: 100; 5 AEROSOL RESPIRATORY (INHALATION) at 08:11

## 2024-03-13 ASSESSMENT — COGNITIVE AND FUNCTIONAL STATUS - GENERAL
WALKING IN HOSPITAL ROOM: 1 - TOTAL
MOVING TO AND FROM BED TO CHAIR: 2 - A LOT
CLIMB 3 TO 5 STEPS WITH RAILING: 1 - TOTAL
STANDING UP FROM CHAIR USING ARMS: 2 - A LOT

## 2024-03-13 NOTE — PROGRESS NOTES
"[facility]   [phone]    Your doctor has referred you for a CT examination that requires the injection of an iodinated contrast material into your bloodstream. This iodinated contrast material, sometimes referred to as \"x-ray dye\" allows for better interpretation of the x-ray films or CT images and results in a more accurate interpretation of the examination.     Without the use of iodinated contrast (x-ray dye), the examination may be less informative and result in a suboptimal examination, and possibly a delay in diagnosis and, if needed, treatment.     The iodinated contrast material is given through a small needle or catheter placed into a vein, usually on the inside of the elbow, on the back of hand, or in a vein in the foot or lower leg.    The most common, though still rare, potential reaction to an intravenous contrast injection is an allergic-like reaction. Most allergic-like reactions are mild, though a small subset of people can have more severe reactions. Mild reactions include mild / scattered hives, itching, scratchy throat, sneezing and nasal congestion. More severe reactions include facial swelling, severe difficulty breathing, wheezing and anaphylactic shock. Those with a prior history allergic-like reaction to the same class of contrast media (such as CT contrast or MRI contrast) are at the highest risk for an allergic reaction.     If you believe you had an allergic reaction to contrast in the past, please let our staff know. We can determine if this increases your risk for a future reaction and provide steps to decrease the risk. This may delay your examination, but it decreases the risk of having a new and possibly more severe reaction to the contrast injection.    People with a history of prior allergic reactions to other substances (such as unrelated medications and food) and patients with a history of asthma have slightly increased risk for an allergic reaction from contrast " material when compared with the general population, but do not require to be pretreated prior to a contrast injection.    You should notify the physician, nurse or technologist if you have ever had any of these conditions or if you have any questions.

## 2024-03-13 NOTE — PROGRESS NOTES
General Surgery Daily Progress Note  ACS [id number].    Subjective   NAEO. Was seen resting comfortably in bed.  Endorsing mild abdominal pain, without nausea, vomiting.  States she is hungry and would like to eat food to avoid losing more weight.  CTA of the abdomen pelvis was performed yesterday.      Objective     Vital signs in last 24 hours:  Temp:  [36.7 °C (98 °F)-37.1 °C (98.7 °F)] 36.8 °C (98.3 °F)  Heart Rate:  [58-71] 61  Resp:  [18] 18  BP: (113-148)/(57-70) 129/62      Intake/Output Summary (Last 24 hours) at 3/13/2024 0724  Last data filed at 3/12/2024 2107  Gross per 24 hour   Intake 10 ml   Output 600 ml   Net -590 ml     Intake/Output this shift:  No intake/output data recorded.    Physical Exam  General: awake and alert, well appearing, no acute distress  HEENT: normocephalic, atraumatic, EOM intact, conjunctiva clear, sclera nonicteric  CV: normal rate, normotensive  Pulm: normal work of breathing, no acute distress  Abd: Soft, minimal distention, diffusely tender to palpation, worse in lower quadrants  Skin: incisions CDI, no surrounding erythema  Extr: no obvious abnormality, moving extremities spontaneously  Neuro: Grossly normal              VTE Assessment: I have reassessed and the patient's VTE risk and treatment plan is appropriate.    Labs:  BMP Results       03/12/24 03/11/24 03/11/24     0723 1730 0628     136 139    K 4.7 5.4 4.5    Cl 107 105 109    CO2 22 23 21    Glucose 77 128 67    BUN 23 24 24    Creatinine 0.9 0.9 0.9    Calcium 8.9 9.1 9.0    Anion Gap 8 8 9    EGFR >60.0 >60.0 >60.0         Comment for EGFR at 0723 on 03/12/24: Calculation based on the Chronic Kidney Disease Epidemiology Collaboration (CKD-EPI) equation refit without adjustment for race.    Comment for EGFR at 1730 on 03/11/24: Calculation based on the Chronic Kidney Disease Epidemiology Collaboration (CKD-EPI) equation refit without adjustment for race.    Comment for EGFR at 0628 on 03/11/24: Calculation  based on the Chronic Kidney Disease Epidemiology Collaboration (CKD-EPI) equation refit without adjustment for race.         CBC Results       03/12/24 03/11/24 03/11/24     0723 1730 0629    WBC 12.67 15.64 10.69    RBC 3.67 4.13 4.06    HGB 9.8 10.9 10.6    HCT 31.8 35.4 35.4    MCV 86.6 85.7 87.2    MCH 26.7 26.4 26.1    MCHC 30.8 30.8 29.9     327 311          Imaging  I have reviewed the Imaging from the last 24 hrs.      Assessment/Plan   Patient is a 63-year-old female with past medical history of scleroderma, ILD, GERD, PE (not on therapy 2/2 epistaxis and hemoptysis), pulmonary HTN on mecitentan/sildenafil, ventral hernia repair, appendectomy, tubal ligation who presented to the hospital on 3/3 with worsening SOB, chest pain, pressure over her sternum and palpitations. Patient was admitted for moderate to large pericardial effusion.CT abdomen pelvis demonstrated mildly dilated loops of small bowel, without definite discrete transition point. Possible early obstruction versus ileus.     - No acute surgical intervention indicated at this time  - CTA abdomen pelvis performed yesterday pending official read  - To remain NPO pending official read  - Multi-modal pain control  - IV-fluids  - f/u morning labs  - IS/OOB  -Rest of care per primary team      Nano Sidhu DO   PGY-1 Resident   General Surgery Service  Please page *ACS [id number] with any questions or concerns.

## 2024-03-13 NOTE — PROGRESS NOTES
Heart Failure / Pulmonary Hypertension Progress Note    Interval History: Seen and examined sitting up in bed. She reports continued abdominal pain today, which may be slightly improved. She reports passing gas once yesterday and once today. She denies light-headedness or shortness of breath. She again expresses concerns about nutrition while NPO.     Consult Background:   Ms. Felix is a 63 y.o. female with a past medical history of Pulmonary HTN, HTN, Raynaud's Disease, Cutaneous Systemic Scleroderma (dx 1998), ILD, PE (3/2023). She is a patient of Dr. Nguyễn. She was previously followed by Dr. Jos Valdez at [facility]. Echocardiogram from 4/21/2022 showed LVEF: 55-60%, mild aortic valve thickening, pulmonary artery systolic pressure: 52 mmHg and trivial pericardial effusion. LHC and RHC (separate occasions) over the last 5-10 years which showed normal hemodynamics and normal coronaries. She had a RHC 9/02/2020 showing top-normal right sided filling pressures with mild pulmonary hypertension, top-normal PVR and normal pulmonary capillary wedge pressure. The cardiac index was normal, seen below.     On 6/27/2022, she was in [facility] ER for chest pain. EKG: NSR without ischemic changes. hsTrop: 12->16, d-dimer: 0.56, CXR showed cardiomegaly and diffuse interstitial prominence.     Dr. Nguyễn ordered echocardiogram, which was completed on 8/22/2022 and showed estimated RVSP = 50-55 mmHg, normal-sized LV with normal LV systolic function. Estimated EF 55- 60%. Wall motion grossly normal, mild concentric left ventricular hypertrophy, grade I LV diastolic dysfunction, probably normal RV size and function.    At her initial visit on 10/11/2022, she reported that she felt very short of breath with minimal activity most of the time. She reports that this started about 1 year prior and had progressively gotten worse. She described PND and bendopnea. She reported that she experienced ankle swelling, worsened over the past year  and usually worse towards the end of the night. She also reported some swelling in her abdomen. She reported daily palpations. I recommended a RHC which was done 11/28/2022 and showed: Normal right sided filling pressures. Mildly elevated left sided filling pressures. Precapillary pulmonary hypertension with mean PA 36 mmHg.    She was hospitalized on 3/19/2023 at [facility] for PE diagnosed on V/Q scan. She reports that she had presented with left arm pain and heaviness. She states that she was started on Apixaban. One week after her last office visit (4/6/2023), she presented to [facility] with chest pain, epistaxis and hemoptysis. Echocardiogram showed: left ventricular cavity size normal with mild left ventricular hypertrophy and preserved systolic function. Estimated EF 65%. Chest CTA showed no evidence of PE; Apixaban was discontinued.     She had a repeat echocardiogram (6/2023) which showed: Normal left ventricular cavity size and mild concentric left ventricular hypertrophy. Normal systolic function. EF: 60%. Normal right ventricular structure and function. Mild tricuspid valve regurgitation with estimated RVSP: 55 mmHg. Compared to previous study from 4/14/2023, estimated right ventricular systolic pressure were higher.    At her last office visit on 7/25/2023, she reported that she was not sure if she received/started the Macitentan. She stated at the time we initiated it, her Pulmonologist also started her on a new medication (Mycophenolate), and she had significant GI intolerance with this. It was trialed at various doses but ultimately stopped. At that visit, I asked her to check if she had Macitentan and if she did to start it and monitor her SpO2.    She underwent V/Q scan in September 2023, which showed intermediate-to-high probability of pulmonary embolic disease, as a result she completed CTA Chest PE which showed: no evidence for filling defect or vessel cutoff to suggest pulmonary  "embolism. Enlargement of the pulmonary arteries compatible with pulmonary arterial hypertension was seen.    She started Macitentan around August or September 2023.    She was last seen in the office in February 2024.     [place] [facility] [place] with chest pressure, palpitations, nausea. She was noted to have hypoxia and tachypnea at presentation. Given moderate to large pericardial effusion, she was monitored in CCU and transferred out on 3/6. hsTroponin was not elevated. She remained hemodynamically stable while in CCU. She has been started on Colchicine and steroids without relief of multi-system pain. She is planned for CCTA this AM. Hypoxia and tachypnea have resolved.     ---    Rheumatology: [facility] ([facility])     Past Medical / Surgical History:  Pulmonary HTN, HTN, Raynaud's Disease, Cutaneous Systemic Scleroderma (dx 1998), ILD, PE (3/2023), Follicular Carcinoma of Thyroid, Tenosynovitis, de Quervain, h/o Hernia Repair, h/o Appendicitis, h/o Digit Amputation, H/o Total Thyroidectomy (Dr. Pacheco at [facility]; 4/2013)    Family & Social History: She is , she has two children. She works as an .     Tobacco: former 2005  EtOH: never   Illicits: never     Her brother has CAD with stent  1st cousin with SLE  Both parents had \"heart problems\"    Allergies:   Allergies   Allergen Reactions   • Aspirin Shortness of breath     Other reaction(s): Unknown  \"stop breathing\"     • Ibuprofen Shortness of breath     Stop breathing   • Iodine Shortness of breath     Other reaction(s): Unknown   • Iodine And Iodide Containing Products Shortness of breath     ? rash   • Penicillins Shortness of breath     Other reaction(s): Unknown   • Sulfa (Sulfonamide Antibiotics) Angioedema   • Clindamycin    • Hydrochlorothiazide      Other reaction(s): Abdominal Pain   Slow heart rate   • Oxycodone      Other reaction(s): Vomiting   • Sulfamethoxazole-Trimethoprim      Other reaction(s): Vomiting, Weakness    • Latex Rash "       Medications:   Current Facility-Administered Medications   Medication Dose Route Frequency Provider Last Rate Last Admin   • acetaminophen (TYLENOL) tablet 975 mg  975 mg oral q6h PRN Brady Kim MD   975 mg at 03/13/24 0806   • amLODIPine (NORVASC) tablet 10 mg  10 mg oral q AM Patricia Haley DO   10 mg at 03/13/24 0807   • [Provider Managed Hold] amLODIPine (NORVASC) tablet 5-10 mg  5-10 mg oral Nightly PRN Dee Arellano DO       • atorvastatin (LIPITOR) tablet 20 mg  20 mg oral Daily (6p) Wolf Bernard MD   20 mg at 03/13/24 1727   • [Provider Managed Hold] bisacodyL (DULCOLAX) 10 mg suppository 10 mg  10 mg rectal Daily Brady Kim MD   10 mg at 03/12/24 0421   • cholecalciferol (vitamin D3) tablet 1,000 Units  1,000 Units oral Daily Dee Arellano DO   1,000 Units at 03/13/24 0806   • clopidogreL (PLAVIX) tablet 75 mg  75 mg oral Daily Wolf Bernard MD       • colchicine (COLCRYS) tablet 0.3 mg  0.3 mg oral Daily Octavio Giordano MD   0.3 mg at 03/13/24 0806   • cyanocobalamin (VITAMIN B12) tablet 100 mcg  100 mcg oral Daily Dee Arellano DO   100 mcg at 03/13/24 0806   • glucose chewable tablet 16-32 g of dextrose  16-32 g of dextrose oral PRN Dee Arellano DO        Or   • dextrose 40 % oral gel 15-30 g of dextrose  15-30 g of dextrose oral PRN Dee Arellano DO        Or   • glucagon (GLUCAGEN) injection 1 mg  1 mg intramuscular PRN Dee Arellano DO        Or   • dextrose 50 % in water (D50) injection 12.5 g  25 mL intravenous PRN Dee Arellano DO       • lactated ringer's infusion   intravenous Continuous Octavio Giordano MD 40 mL/hr at 03/13/24 1450 New Bag at 03/13/24 1450   • levothyroxine (SYNTHROID) tablet 100 mcg  100 mcg oral Daily Dee Arellano, DO   100 mcg at 03/13/24 0545   • lidocaine (ASPERCREME) 4 % topical patch 1 patch  1 patch Topical  Daily Sherita Panchal MD   1 patch at 03/10/24 0954   • [Provider Managed Hold] loperamide (IMODIUM) capsule 2 mg  2 mg oral 3x daily PRN Wolf Bernard MD   2 mg at 03/08/24 2051   • meclizine (ANTIVERT) chewable tablet 25 mg  25 mg oral 3x daily PRN Wagner Chen MD   25 mg at 03/05/24 1817   • metoprolol (LOPRESSOR) injection 5 mg  5 mg intravenous PRN Timothy Arvizu DO   5 mg at 03/07/24 1215   • metoprolol tartrate (LOPRESSOR) tablet 50 mg  50 mg oral q30 min PRN Timothy Arvizu DO   50 mg at 03/07/24 1000   • mirtazapine (REMERON) tablet 7.5 mg  7.5 mg oral Nightly Wolf Bernard MD   7.5 mg at 03/12/24 2023   • mometasone-formoterol (DULERA 100) 100-5 mcg/actuation inhaler 2 puff  2 puff inhalation BID (8a, 8p) Dee Arellano DO   2 puff at 03/13/24 0811    And   • tiotropium bromide (SPIRIVA RESPIMAT) 2.5 mcg/actuation inhaler 2 puff  2 puff inhalation Daily (8a) Dee Arellano DO   2 puff at 03/12/24 0839   • mupirocin (BACTROBAN) 2 % ointment 1 Application  1 Application Topical Daily Dee Arellano DO   1 Application at 03/13/24 0813   • ondansetron ODT (ZOFRAN-ODT) disintegrating tablet 4 mg  4 mg oral q8h PRN Dee Arellano DO   4 mg at 03/11/24 1621    Or   • ondansetron (ZOFRAN) injection 4 mg  4 mg intravenous q8h PRN Dee Arellano DO   4 mg at 03/12/24 0143   • [START ON 3/14/2024] pantoprazole (PROTONIX) injection 40 mg  40 mg intravenous q24h Wolf Bernard MD       • predniSONE (DELTASONE) tablet 20 mg  20 mg oral Daily Wolf Bernard MD   20 mg at 03/11/24 0942   • Pt Own- Macitentan (OPSUMIT) tablet 10mg  10 mg oral Daily Dee Arellano DO   10 mg at 03/13/24 0807   • [Provider Managed Hold] senna (SENOKOT) tablet 2 tablet  2 tablet oral Nightly Brady Kim MD   2 tablet at 03/12/24 0420   • sildenafiL (pulm.hypertension) (REVATIO) tablet 20 mg  20 mg  oral TID Octavio Giordano MD   20 mg at 03/13/24 1450       Review of Systems:   All other systems reviewed and are negative except as per HPI.    Physical Exam:     Wt Readings from Last 10 Encounters:   03/13/24 43.5 kg (96 lb)   02/01/24 47.6 kg (105 lb)   07/25/23 50.8 kg (112 lb)   07/25/23 50.8 kg (112 lb)   06/21/23 51.3 kg (113 lb)   04/19/23 51.7 kg (113 lb 14.4 oz)   04/06/23 52.6 kg (116 lb)   01/18/23 54.4 kg (120 lb)   11/28/22 55.3 kg (122 lb)   10/07/22 56.2 kg (124 lb)       BP Readings from Last 5 Encounters:   03/13/24 (!) 122/59   02/01/24 120/70   07/25/23 130/80   07/25/23 134/80   06/21/23 132/85       Vitals:    03/13/24 1457   BP: (!) 122/59   Pulse: 63   Resp: 18   Temp: 36.6 °C (97.8 °F)   SpO2: 96%       Body mass index is 15.49 kg/m².  Body surface area is 1.42 meters squared.    Constitutional: NAD, AAOx3  HENT: Normocephalic, atraumatic head, sclerae anicteric, no cervical lymphadenopathy, trachea midline  Cardiovascular: RRR, no murmurs, rubs or gallops, JVP: 7 cm H2O   cm H2O, no carotid bruits.  Respiratory: CTA bilateral lung fields, no wheezes, rales or rhonchi  GI: soft, non-tender/non-distended  Musculoskeletal / Extremities: no peripheral edema, +2 pulses bilateral radial, DP/PT arteries, skin tightening on face and fingertips  Skin: no rashes  Neuro / Psych: no focal deficits, CNII-XII grossly intact, appropriate, cooperative    Diagnostic Data:     Results from last 7 days   Lab Units 03/13/24  0846 03/12/24  0723 03/11/24  1730   SODIUM mEQ/L 138 137 136   POTASSIUM mEQ/L 4.6 4.7 5.4*   CHLORIDE mEQ/L 107 107 105   CO2 mEQ/L 22 22 23   BUN mg/dL 24 23 24   CREATININE mg/dL 0.9 0.9 0.9   CALCIUM mg/dL 9.0 8.9 9.1   ALBUMIN g/dL 3.0* 3.1* 3.5   BILIRUBIN TOTAL mg/dL 0.3 0.3 0.3   ALK PHOS IU/L 49 50 58   ALT IU/L 15 16 17   AST IU/L 12* 15 25   GLUCOSE mg/dL 95 77 128*     Results from last 7 days   Lab Units 03/13/24  0846 03/12/24  0723 03/11/24  1730   WBC K/uL 9.30 12.67*  15.64*   HEMOGLOBIN g/dL 9.1* 9.8* 10.9*   HEMATOCRIT % 29.6* 31.8* 35.4   MCV fL 86.0 86.6 85.7   PLATELETS K/uL 290 287 327     Component      Latest Ref [facility] 4/13/2023 3/3/2024 3/12/2024   Lactate      0.4 - 2.0 mmol/L 1.0  1.1  0.9      Component      Latest Ref [facility] 4/13/2023 2/27/2024 3/3/2024   BNP      <=100 pg/mL 105 (H)  82  119 (H)       Component      Latest Ref [facility] 3/3/2024   High Sens Troponin I      <15.0 pg/mL 12.6    High Sens Troponin I       12.2      Component      Latest [facility] 3/4/2024   CRP      <7.00 mg/L 8.90 (H)       Component      Latest [facility] 3/4/2024   Sed Rate      0 - 30 mm/hr 119 (H)       Component      Latest [facility] 3/3/2024   D-Dimer, Quant      0.00 - 0.50 ug/mL FEU 0.68 (H)       Component      Latest [facility] 4/18/2023 9/27/2023   Sodium      134 - 144 mmol/L 139  140    Potassium, Bld      3.5 - 5.2 mmol/L 4.4  4.1    Chloride      96 - 106 mmol/L 108  107 (H)    CO2      20 - 29 mmol/L 23  20    BUN      8 - 27 mg/dL 26 (H)  18    Creatinine      0.57 - 1.00 mg/dL 1.1  0.96    Glucose      70 - 99 mg/dL 82  83    Calcium      8.7 - 10.3 mg/dL 9.1  9.5    eGFR      >59 mL/min/1.73 >60.0  67      Component      Latest Ref [facility] 4/13/2023   Hemoglobin A1C      <5.7 % 4.4      Component      Latest Ref [facility] 4/13/2023 4/18/2023   WBC      3.80 - 10.50 K/uL 7.12  6.95    Hemoglobin      11.8 - 15.7 g/dL 10.6 (L)  10.1 (L)    Hematocrit      35.0 - 45.0 % 34.3 (L)  33.0 (L)    MCV      83.0 - 98.0 fL 87.7  90.4    Platelets      150 - 369 K/uL 267  256        Component      Latest Ref Rng 4/14/2023   Triglycerides      30 - 149 mg/dL 44    Cholesterol      <=200 mg/dL 194    HDL      >=55 mg/dL 49 (L)    LDL Calculated      <=100 mg/dL 136 (H)    Non-HDL, Calculated      mg/dL 145    RISK      <=5.0  4.0       Component      Latest Ref Rng 4/13/2023   High Sens Troponin I      <15.0 pg/mL 23.0 (H)       Component      Latest Ref Rng 4/14/2023   Ferritin      11 - 250 ng/mL 75       Component      Latest Ref Rng 4/13/2023   TSH      0.34 - 5.60 mIU/L 4.71      Component      Latest Ref Rng 4/14/2023   Iron      35 - 150 ug/dL 29 (L)    TIBC      270 - 460 ug/dL 245 (L)    UIBC      180 - 360 ug/dL 216    Iron Saturation      15 - 45 % 12 (L)        Component      Latest Ref Rng 6/27/2022 4/13/2023   BNP      <=100 pg/mL 111 (H)  105 (H)                                     ---    ECG (3/3/2024, personally reviewed):   Sinus tachycardia   Nonspecific ST and T wave abnormality   HR: 129 bpm     ---    CTA Abd / Pelvis (3/13/2024, personally reviewed):   There is dilatation of the of proximal/mid small bowel with relatively abrupt  transition left hemiabdomen, likely obstruction. Questionable lesion at the  transition point. This could be better evaluated with CT or MRI enterography.     Aorta and major branches patent. Superior mesenteric artery patent without  significant stenosis.  Suspect moderate stenosis of the origins of the of renal arteries bilaterally.     Large pericardial effusion, no change.     Patchy opacities lower lungs, similar to prior.     TTE (3/4/2024, personally reviewed):   • Normal-sized left ventricle. Mild left ventricular hypertrophy. Preserved systolic function. LVEF 60%. No regional wall motion abnormalities. Grade II diastolic dysfunction.  Normal global longitudinal strain (-20%).  • The aortic valve is not well seen.  Cannot determine the number of cusps.  Sclerotic leaflets.  No aortic stenosis.  Trace aortic insufficiency.  • Normal sized aortic root.  The visualized portion of the ascending aorta is normal in size.  • The mitral valve leaflets are thickened. Mild mitral annular calcification. Trace mitral regurgitation.   • Mildly dilated left atrium.  • Normal-sized right ventricle. Normal right ventricular systolic function.  • Thickened tricuspid valve. There is mild tricuspid regurgitation with estimated RVSP of 46 mmHg.   • Pulmonic valve is not well  visualized. Trace pulmonic regurgitation.   • Mildly dilated right atrium.  • IVC is enlarged with <50% respiratory variation consistent with elevated right atrial filling pressure (at least 15 mmHg).   • Moderate, circumferential, circumferential pericardial effusion.  The largest pocket is located inferolaterally.  Elevated right atrial pressure.  No other clear echocardiographic features of increased pericardial pressure.  Correlate clinically.   • Compared to previous TTE from 6/21/2023, pericardial effusion now moderate in size.  Right atrial pressure now elevated.       CTA Chest (3/3/2024):   IMPRESSION:  1.  No evidence of acute pulmonary embolism to the level of the segmental  branches.  2.  Moderate to large pericardial effusion measuring higher than simple fluid  density.  3.  Lower lobe lung field predominant interstitial thickening with some relative  subpleural sparing, consistent with a chronic interstitial lung disease,  unchanged from the prior study.  4.  Continued bilateral axillary, mediastinal, and bilateral hilar  lymphadenopathy, not definitely changed from the prior study, possibly related  to the patient's sarcoid.  5.  Emphysema.     CTA Chest PE (10/4/2023):  IMPRESSION:  1. No evidence for filling defect or vessel cutoff to suggest pulmonary  embolism.  Enlargement of the pulmonary arteries compatible with pulmonary  arterial hypertension.  2. Changes throughout the lungs as described above which can be seen with  systemic sclerosis/scleroderma.  Underlying pneumonia the lung bases cannot be  entirely excluded in the proper clinical setting.  3.  Additional stable findings as described above.        VQ (9/12/2023):  IMPRESSION:  Intermediate to high probability of pulmonary embolic disease.     6MWT (7/25/2023, on Sildenafil 20 mg TID):        TTE (6/21/2023, personally reviewed):  1. Normal left ventricular cavity size and mild concentric left ventricular hypertrophy. Normal systolic  function. EF: 60%. No regional wall motion abnormalities. Grade I diastolic dysfunction.   2. Aortic valve cusps not well visualized. Trace aortic regurgitation. Aortic valve and root sclerosis.  3. Thickened mitral valve leaflets. Mild mitral annular calcification. Trace mitral regurgitation. Mildly dilated left atrial size.   4. Normal right ventricular structure and function. Mild tricuspid valve regurgitation with estimated RVSP: 55 mmHg (assuming right atrial pressure of 3 mmHg). Normal right atrial size. Pulmonic valve not well visualized. Trace pulmonic valve regurgitation. Pulsed wave Doppler of the RVOT systolic profile show decreased acceleration times ( msec) and late systolic notching consistent with elevated PVR.   5. IVC size is normal with > 50% inspiratory collapse consistent with normal right atrial pressure. Small pericardial effusion without echocardiographic evidence of tamponade.  6. Compared to previous study from 4/14/2023, estimated right ventricular systolic pressure is higher.        TTE (4/14/2023, personally reviewed):   • The left ventricular cavity size is normal with mild left ventricular hypertrophy and preserved systolic function. Estimated EF 65%. No regional wall motion abnormalities. Grade I diastolic dysfunction.     • The aortic valve is tricuspid. Aortic valve sclerosis. Trace aortic regurgitation.      • The visualized portions of the aortic root and ascending aorta are normal in size.     • The mitral valve is mildly thickened. Mild mitral annular calcification. Trace mitral regurgitation.       • The left atrium is severely dilated.      • The right ventricle is normal in size with preserved systolic function     • The pulmonic valve is not well seen.     • The tricuspid valve is normal in appearance. mild tricuspid regurgitation with an estimated RVSP of 34 mmHg.       • Right atrium is normal in size.     • The IVC is small and collapses > 50% with inspiration  consistent with normal right atrial pressures.     • Small pericardial effusion, mostly posterior.       • Compared to previous echocardiogram from 8/22/2022, there is no significant change        TTE (11/28/2022, personally reviewed):   • Normal right- and mildly elevated left-sided filling pressures (RA: 3 mmHg, PCWP: 13 mmHg)  • Elevated pulmonary artery pressures (60/22(35 mmHg)) in the setting of PCWP: 13 mmHg.   • Normal cardiac output and index (CO: 5.1 L/min, CI: 3.2 L/min/m2)  • PVR: 4.3 Wood units. SVR: 1840 dynes/sec/cm5      TTE (8/22/2022, personally reviewed):  · Normal-sized LV. Normal LV systolic function. Estimated EF 55- 60%. Wall motion appears grossly normal. Mild concentric left ventricular hypertrophy. Grade I LV diastolic dysfunction.  · Pulmonic valve not well visualized. Grossly normal pulmonic valve structure. Trace pulmonic valve regurgitation. No pulmonic valve stenosis.  · Aortic valve not well visualized. Aortic valve not well seen but likely trileaflet. Focal aortic valve calcification present. Sclerotic aortic valve leaflets. Mild aortic valve regurgitation. No aortic valve stenosis.  · RV not well visualized. Normal-sized RV. Normal RV systolic function.  · Normal-sized RA.  · There is a small loculated pericardial effusion anterior to the right atrium. No cardiac tamponade.  · Sclerotic mitral valve. Mitral valve calcification of the anterior leaflet present.  · Trace mitral valve regurgitation.  · No significant mitral valve stenosis.  · Normal-sized IVC. IVC demonstrates normal respiratory collapse.  · Tricuspid valve structure is grossly normal. Mild to moderate tricuspid valve regurgitation.  · Estimated RVSP = 50-55 mmHg.  · Mildly dilated LA.  · No previous echocardiogram available for comparison.     TTE (4/21/2022, outside study):  This result has an attachment that is not available.   •  A complete transthoracic echocardiogram (including 2D, color flow   Doppler, spectral  Doppler and M-mode imaging) was performed using the   standard protocol. The study quality was adequate.   •  The left ventricle is normal in size. There is moderately increased   wall thickness consistent with moderate concentric hypertrophy.   Endocardium is incompletely visualized, but no regional wall motion   abnormalities are noted in the visualized segments. Normal left   ventricular ejection fraction. The left ventricular ejection fraction by   visual estimate is 55% to 60%.   •  The right ventricle is normal in size. There is normal function of the   right ventricle.   •  The left atrium is normal in size. Left atrial volume is 58 mL.   •  The right atrial volume measures 52 mL. The right atrial volume index   measures 34 mL/m2.   •  There is trace mitral regurgitation. There is no mitral stenosis.   •  The aortic valve is trileaflet. There is mild aortic valve thickening.   There is no aortic stenosis. There is trace aortic regurgitation.   •  There is mild to moderate tricuspid regurgitation. Pulmonary artery   systolic pressure measures 52 mmHg. The pulmonary artery systolic pressure   is moderately elevated.   •  The aorta measures 3.2 cm at the sinus of Valsalva. The proximal   segment of the ascending aorta is mildly enlarged. The proximal segment of   the ascending aorta measures 3.4 cm. The proximal segment of the ascending   aorta measures 2.2 cm/m2, indexed for body surface area.   •  Trivial pericardial effusion present. There is no echocardiographic   evidence of cardiac tamponade.   •  The inferior vena cava is normal in size (diameter <21 mm) and   decreases >50% in size with inspiration, suggesting a normal right atrial   pressure of 3 mmHg (range 0-5 mm Hg).   •  Compared to the prior study of 3/13/2020, there are no significant   changes.        PFT (3/23/2022):      PFT (7/14/2021):      CT Chest (5/2/2021, outside study):  CLINICAL INFORMATION: Systemic sclerosis. Interstitial lung  disease. Groundglass nodule     PROCEDURE: Unenhanced CT of the chest was performed using thin section reconstructions. Images were obtained on inspiration and expiration.     COMPARISON: June and August 2020     FINDINGS:     LUNGS, PLEURA:     Groundglass opacities with reticulation with subpleural sparing in the lower lobes, without honeycombing or architectural distortion, unchanged.     Right upper lobe groundglass nodule, image 114 of series 3, 8 x 11 mm, previously 6 mm.     Expiratory images are of diagnostic quality and do not demonstrate evidence of clinically significant air trapping.     CARDIOVASCULAR, MEDIASTINUM, THYROID: Coronary calcifications. Trace pericardial effusion.     LYMPH NODES: Subcentimeter mediastinal lymph nodes, unchanged. Unchanged axillary lymph nodes.     SKELETON, CHEST WALL: No destructive bone lesion     UPPER ABDOMEN: Patulous esophagus. 3 mm right renal stone.       RHC (9/02/2020):  RA   8 mmHg   RV   40/5 mmHg   PA (mean)  44/16 (25) mmHg   PCWP   12 mmHg   CO   4.65 lpm (Thermodilution)   CI   2.65 lpm/m-squared   SVI    33 ml/beat/m-squared (lower limit normal 29)   PVR   2.8 mmHg/l/min (Wood units)   SVR   2064 dyne-sec-cm-5         PFT (3/18/2020):      CCTA (3/7/2024, personally reviewed):      LEFT MAIN: Patent.     LAD: Proximal: Calcified plaque with 30 to 50% stenosis.  Mid: Mixed plaque with  50 to 70% stenosis, CT FFR 0.89.  Distal: Patent.  D1: Patent  D2: Patent, CT FFR 0.88     LCX: The circumflex and 1 marginal branch are patent with normal CT FFR.     RCA: Proximal: Calcified plaque with minimal stenosis.  Otherwise the RCA, PDA,  and PLB are patent with normal CT FFR.     CORONARY CALCIUM COMPOSITE AGATSTON SCORE: 313  LM: LAD: 312    LCX: RCA: 1     This places the patient in the HICKS 96th risk percentile for age and gender  matched individuals.     VENTRICULAR FUNCTION AND WALL MOTION     LV EJECTION FRACTION: 66%  LV WALL MOTION: Normal    SUMMARY:  1.  Two-vessel coronary artery disease as above that is moderate in severity.  CT  FFR is normal..  2. There is mitral annular calcification and aortic sclerosis.  The right atrium  is enlarged.  There is left ventricular hypertrophy.  There is a moderate to  large circumferential pericardial effusion.  3. Normal left ventricular wall motion and systolic function.  4. Coronary calcium score 313, 96th percentile         TTE (3/13/2020, outside study):  · The study quality was good.   · The left ventricle is normal in size. Top normal left ventricular wall   thickness. There are no segmental wall motion abnormalities. The left   ventricular ejection fraction is 55-60% by visual estimate and 3D   echocardiography. Normal left ventricular ejection fraction.   · There is grade I (mild) LV diastolic dysfunction.   · The right ventricle is normal in size. There is normal function of the   right ventricle.   · The right atrium is mildly dilated.   · There is mild mitral valve anterior leaflet thickening. There is mild   mitral valve leaflet calcification. There is flattening of the mitral   leaflets without raphael prolapse. There is trace mitral regurgitation.   · The aortic valve is trileaflet. There is mild thickening of the aortic   valve. There is mild calcification of the aortic valve. There is no aortic   /stenosis. There is trace aortic regurgitation.   · There is mild tricuspid valve leaflet thickening. There is mild to   moderate tricuspid regurgitation. The pulmonary artery systolic pressure   is moderately elevated. Pulmonary artery systolic pressure measures 50   mmHg. There is mid-systolic notching of the RVOT VTI consistent with   elevated pulmonary vascular resistance.   · The inferior vena cava is normal in size (diameter <21 mm) and decreases   >50% in size with inspiration, suggesting a normal right atrial pressure   of 3 mmHg (range 0-5 mm Hg).   · Trivial loculated pericardial effusion present adjacent to the  right   ventricle and adjacent to the right atrium.          Assessment / Plan:     1. Musculoskeletal Pain Including Chest Pain  - Suspect this is auto-immune disease related inflammation; although, there has not been demonstrable improvement with Colchicine or steroids to date. Agree with steroid course.   - Consideration for Rheumatology consultation. As an outpatient, revisiting immune system suppression through biologics (or non-biologics) is reasonable.   - CCTA revealed moderate non-obstructive CAD, calcium score of 313  -Reports resolved diarrhea with lower Colchicine 0.3 mg daily.     2. Pericardial Effusion  - Moderate in size without clinical tamponade  - Will recheck TTE at one month if no interim clinical change - hemodynamically stable  - Continue Colchicine 0.3 mg daily    3. Pulmonary Hypertension (WHO Group I, NYHA/WHO FC III):   - Occurring in the background of Systemic Scleroderma with ILD. August 2022 TTE showed normal RV size and function, but progressive exertional decline, worsened DLCO and resting tachycardia suggest there may be progression of her pulmonary vascular disease and limitation of cardiac output. This was proven with 11/2022 RHC showing mean PA 35 mmHg (with PCWP 13 mmHg) and PVR 4.3 Wood units. June 2023 TTE showed increase in RVSP. New TTE with RVSP decreased to 40s (even with higher estimated RAP).  - Continue uninterrupted Sildenafil and Macitentan.     4. HTN:   - Improved elevated.   - Continued Amlodipine.     5. Systemic Scleroderma / Raynaud's Disease:   - Per Rheumatology (Outpatient: Dr. Giraldo).      6. ILD:   - Per Pulmonology and Rheumatology (Dr. Giraldo) outpatient.   - She has not tolerated trials of Mycophenolate.     7. PE:   - She has had elevated D-dimer and elevated probability on V/Q scans, but CTA Chest has consistently not shown evidence for acute or chronic thromboembolism. Apixaban has been discontinued.     8. CAD  - LAD and RCA non-obstructive disease on  3/7 CCTA  - LDL goal < 70  - Continue Clopidogrel, no ASA due to allergy.   - Continue Atorvastatin 20 mg daily      Timothy Arvizu DO

## 2024-03-13 NOTE — PROGRESS NOTES
Internal Medicine  Daily Progress Note       SUBJECTIVE   This is a 63 y.o. year-old female admitted on 3/3/2024 with Pericardial effusion [I31.39]  Interstitial lung disease (CMS/HCC) [J84.9]  Hypoxia [R09.02].    Interval History:   Patient was NPO overnight. She had a repeat Ct abdomen pending final read. She reports abdominal pain is still there. She denies any nausea or vomiting. She reports that she has an appetite and would like to start a diet.      OBJECTIVE   Vital signs in last 24 hours:  Temp:  [36.7 °C (98 °F)-37.1 °C (98.7 °F)] 36.8 °C (98.3 °F)  Heart Rate:  [58-71] 61  Resp:  [18] 18  BP: (113-148)/(57-70) 129/62  SpO2:  [92 %-96 %] 92 %  Oxygen Therapy: None (Room air)    Weight & I/Os:  Weights (last 5 days)     Date/Time Weight    03/13/24 0555 43.5 kg (96 lb)    03/12/24 0611 46.4 kg (102 lb 4.8 oz)    03/11/24 0539 46.5 kg (102 lb 8.2 oz)    03/10/24 0555 44.8 kg (98 lb 12.8 oz)    03/09/24 0608 42 kg (92 lb 9.5 oz)    03/08/24 0533 45.4 kg (100 lb 1.6 oz)          Intake/Output Summary (Last 24 hours) at 3/13/2024 0659  Last data filed at 3/12/2024 2107  24 Hour Net Input/Output from 7AM Yesterday   Intake 10 ml   Output 600 ml   Net -590 ml        PHYSICAL EXAMINATION   Physical Exam  Neck:      Comments: JVD  Cardiovascular:      Rate and Rhythm: Normal rate and regular rhythm.      Pulses: Normal pulses.      Heart sounds: Normal heart sounds.   Pulmonary:      Effort: Pulmonary effort is normal.      Breath sounds: Normal breath sounds.   Abdominal:      Comments: Diffusely tender abdomen    Musculoskeletal:      Right lower leg: No edema.      Left lower leg: No edema.   Neurological:      Mental Status: She is alert. Mental status is at baseline.          LINES, CATHETERS, DRAINS, AIRWAYS, & WOUNDS   Lines, drains, airways, & wounds:  Peripheral IV (Adult) 03/11/24 Left;Posterior Forearm (Active)   Number of days: 2       Peripheral IV (Adult) 03/12/24 Anterior;Proximal;Right Forearm  (Active)   Number of days: 1       Wound Other (comment) Anterior;Left Toe (Great) (Active)   Number of days: 9       Wound Pressure Injury Anterior;Left Ankle (Active)   Number of days: 9       Wound Pressure Injury Anterior;Right Ankle (Active)   Number of days: 9       Wound Other (comment) Anterior;Right Toe (2nd) (Active)   Number of days: 9        LABS, IMAGING, & TELE   Labs:  No new labs.    Results from last 7 days   Lab Units 03/12/24  0723 03/11/24  1730 03/11/24  0629   WBC K/uL 12.67* 15.64* 10.69*   HEMOGLOBIN g/dL 9.8* 10.9* 10.6*   HEMATOCRIT % 31.8* 35.4 35.4   PLATELETS K/uL 287 327 311       Results from last 7 days   Lab Units 03/12/24  0723 03/11/24  1730 03/11/24  0628   SODIUM mEQ/L 137 136 139   POTASSIUM mEQ/L 4.7 5.4* 4.5   CHLORIDE mEQ/L 107 105 109*   CO2 mEQ/L 22 23 21   BUN mg/dL 23 24 24   CREATININE mg/dL 0.9 0.9 0.9   CALCIUM mg/dL 8.9 9.1 9.0   ALBUMIN g/dL 3.1* 3.5 3.3*   BILIRUBIN TOTAL mg/dL 0.3 0.3 0.3   ALK PHOS IU/L 50 58 55   ALT IU/L 16 17 10   AST IU/L 15 25 12*   GLUCOSE mg/dL 77 128* 67*     Imaging personally reviewed (does not include unread studies):  No results found.       ASSESSMENT & PLAN   Nausea and vomiting  Assessment & Plan  Patient developed nausea and dark vomiting on 3/11 afternoon   CT abdomen showed dilated loops of bowel that could represent SBO vs ileus   Surgery team consulted and recommended CT abdomen with IV contrast  Ct abdomen pending final read   Patient NPO and given IV fluids     Interstitial lung disease (CMS/HCC)  Assessment & Plan  Patient has a PMH of ILD, CTEPH and phtn on sildenafil, mecetentan, and scleroderma   Has been discussing Tocilizumab with rheumatology but hasn't started it yet   Echo: EF 60%, No regional wall abnormalities, Grade II diastolic dysfunction  Continue pain meds as able     Impression: confirmed ILD and precap phtn 2/2 scleroderma, pw worsening SHOB, cp, and new pericardial effusion     - continue home meds   - cont  PTA amlodipine   - continue phtn meds as able   - macitanetan and sildenafil       Pulmonary hypertension (CMS/HCC)  Assessment & Plan  Patient has a pMH of pulmonary htn, scleroderma, CTEPh and precapillary pHTN   Sees Dr. Arvizu   Multiple echos and RHC and LHC   Endorses continued worsening SHOB since 10/2022 with BOWSER, palpitations, bendopnea   3/4: Echo: EF 60%, No regional wall abnormalities, Grade II diastolic dysfunction  Most recent echo 6/2023 showed EF 60%, mild TR, RVSP 55, increased RV systolic pressures compared to April   Had a PE in march 2023, was on elequis however had hemoptysis and it was discontinued   VQ in sept 2023 showed mod-high prob of PE disease and CTA PE showed no acute filling defect but enlargmeent of PA cw PHTN    recently started mecitentan aug/sept 2023 wo much subjective change     Ddx: multifactorial phtn scleroderma ILD vs. CTEPH vs. Pericardial effusion vs. Combo thereof     Impression: 62yo lady with PMH of raynauds, phtn, cutaneous scleroderma, ILD, CTEPh pw worsening SHOB x 24 hours with new pericardial effusion seen on CT. Prior echos have shown increasing RVSP and worsening phtn, for which she is on sildenafil, mecitentan.     - continue sildenafil   - continue mecitentan   - cards on board   - closely monitor BP in ICU   - SCDs for DVT given hemoptysis            * Pericardial effusion  Assessment & Plan  Ddx: autoimmune vs. Myocardial injury vs. Bacterial infection vs. Viral vs. Malignant   Patient has a PMH of phtn, scleroderma, CTEPH   pw increasing chest pressure x 1 day, without radiation into arms  Endorses continued GERD sxs however no new NVD  S/p Methylprednisolone x1 in ED   CT shows pericardial effusion   Echo: EF 60%, No regional wall abnormalities, Grade II diastolic dysfunction  ; Trop 12.9   [id number] and CRP 8.9   MRSA Nares: Negative  TB panel negative 2/27   BCx: NGTD      - started colchicine but dropping dose due to diarrhea to colchicine 0.3.   - on  low dose prednisone 20mg daily (as indomethacin alternative, pt reports allerges to ASA and Ibuprofen)   - coronary CTA showed moderate 2 vessel CAD. Recommend plavix and statin but patient refusing medications.   - TB panel 2/27 negative   - CBC daily  - CMP daily   - scds for dvt ppx given hemooptysis   - Allergist recommendations appreciated  - VS frequently       Hypothyroid  Assessment & Plan  Patient has a PMH of hypothyroidism 2/2 thyroidectomy for follicular carcinoma of the thyroid     Note H/o Total Thyroidectomy (Dr. Pacheco at [facility]; 4/2013)  - continue home thyroid medications Synthroid 100mcg qd    Acute on chronic heart failure with preserved ejection fraction (CMS/HCC)  Assessment & Plan  Patient has a PMH of HFPEF with EF 65%   Endorsing worsening HSOB, denies any ANGEL LUIS, UFD or medication complaince difficulty   CT shows pericardial effusion without pleural effusions or pulmonary edema   Exam negative for ANGEL LUIS, crackles     Impression: 64 yo lady with PMH of scleroderma and PE, phtn on mecitentan and sildenafil pw worseing BOWSER and shob with new pericardial effusion on ct imaging, negative for pulmonary edema or other s/sxs of CHF exacerbation. Unlikely this is a CHF exacerbation.     - continue to monitor   - continue home medications     History of pulmonary embolism  Assessment & Plan  Patient has a PMH of phtn and PE tx w/ apixiban however cb epistaxis and hemoptysis   Stopped elequis in June 2023   pw worsening shob and new pericardial effusion   Continues to endorse hemoptysis and epistaxis worsening over last few days   Echo EF 60%, no regional wall motion abnormalities, grade II diastolic dysfunction    - scds for now for dvt ppx       Open wound of right foot  Assessment & Plan  Patient has multiple wounds noted on her feet bilaterally   Wound images from admission in the media tab   Is on CCB at home   States wounds have difficulty healing  Also now with poor nutritional status iso scleroderma  and ILD     Ddx: nonhealing ulcers 2/2 autoimmune disease vs. 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 Has chronic nonhealing ulcers to her feet, most likely secondary to her scleroderma and poor nutritional status     - cont CCB  - wound ostomy on board   - consider pain management   - nutrition consult        VTE Assessment: Padua    VTE Prophylaxis: Current anticoagulants:    •None      Code Status: Full Code  Estimated discharge date: 3/14/2024       ATTENDING DOCUMENTATION  ALSO SEE ATTENDING ATTESTATION SECTION OF NOTE

## 2024-03-14 ENCOUNTER — APPOINTMENT (INPATIENT)
Dept: RADIOLOGY | Facility: HOSPITAL | Age: 64
DRG: 315 | End: 2024-03-14
Payer: COMMERCIAL

## 2024-03-14 LAB
ALBUMIN SERPL-MCNC: 3.1 G/DL (ref 3.5–5.7)
ALP SERPL-CCNC: 49 IU/L (ref 34–125)
ALT SERPL-CCNC: 14 IU/L (ref 7–52)
ANION GAP SERPL CALC-SCNC: 8 MEQ/L (ref 3–15)
AST SERPL-CCNC: 12 IU/L (ref 13–39)
BILIRUB SERPL-MCNC: 0.3 MG/DL (ref 0.3–1.2)
BUN SERPL-MCNC: 23 MG/DL (ref 7–25)
CALCIUM SERPL-MCNC: 9 MG/DL (ref 8.6–10.3)
CHLORIDE SERPL-SCNC: 106 MEQ/L (ref 98–107)
CO2 SERPL-SCNC: 24 MEQ/L (ref 21–31)
CREAT SERPL-MCNC: 0.9 MG/DL (ref 0.6–1.2)
EGFRCR SERPLBLD CKD-EPI 2021: >60 ML/MIN/1.73M*2
ERYTHROCYTE [DISTWIDTH] IN BLOOD BY AUTOMATED COUNT: 19.9 % (ref 11.7–14.4)
GLUCOSE BLD-MCNC: 130 MG/DL (ref 70–99)
GLUCOSE BLD-MCNC: 97 MG/DL (ref 70–99)
GLUCOSE SERPL-MCNC: 81 MG/DL (ref 70–99)
HCT VFR BLD AUTO: 30.7 % (ref 35–45)
HGB BLD-MCNC: 9.3 G/DL (ref 11.8–15.7)
MCH RBC QN AUTO: 26.6 PG (ref 28–33.2)
MCHC RBC AUTO-ENTMCNC: 30.3 G/DL (ref 32.2–35.5)
MCV RBC AUTO: 87.7 FL (ref 83–98)
PDW BLD AUTO: 11.8 FL (ref 9.4–12.3)
PLATELET # BLD AUTO: 287 K/UL (ref 150–369)
POCT TEST: ABNORMAL
POCT TEST: NORMAL
POTASSIUM SERPL-SCNC: 4.5 MEQ/L (ref 3.5–5.1)
PROT SERPL-MCNC: 6.5 G/DL (ref 6–8.2)
RBC # BLD AUTO: 3.5 M/UL (ref 3.93–5.22)
SODIUM SERPL-SCNC: 138 MEQ/L (ref 136–145)
WBC # BLD AUTO: 12.71 K/UL (ref 3.8–10.5)

## 2024-03-14 PROCEDURE — 63700000 HC SELF-ADMINISTRABLE DRUG: Performed by: STUDENT IN AN ORGANIZED HEALTH CARE EDUCATION/TRAINING PROGRAM

## 2024-03-14 PROCEDURE — 63600000 HC DRUGS/DETAIL CODE: Mod: JZ

## 2024-03-14 PROCEDURE — 63700000 HC SELF-ADMINISTRABLE DRUG

## 2024-03-14 PROCEDURE — 63600105 HC IODINE BASED CONTRAST

## 2024-03-14 PROCEDURE — 80053 COMPREHEN METABOLIC PANEL: CPT

## 2024-03-14 PROCEDURE — 36415 COLL VENOUS BLD VENIPUNCTURE: CPT

## 2024-03-14 PROCEDURE — 99232 SBSQ HOSP IP/OBS MODERATE 35: CPT | Performed by: INTERNAL MEDICINE

## 2024-03-14 PROCEDURE — 25000000 HC PHARMACY GENERAL

## 2024-03-14 PROCEDURE — 21400000 HC ROOM AND CARE CCU/INTERMEDIATE

## 2024-03-14 PROCEDURE — 99233 SBSQ HOSP IP/OBS HIGH 50: CPT | Performed by: HOSPITALIST

## 2024-03-14 PROCEDURE — 74250 X-RAY XM SM INT 1CNTRST STD: CPT

## 2024-03-14 PROCEDURE — 85027 COMPLETE CBC AUTOMATED: CPT

## 2024-03-14 PROCEDURE — 99231 SBSQ HOSP IP/OBS SF/LOW 25: CPT | Performed by: SURGERY

## 2024-03-14 PROCEDURE — 25000000 HC PHARMACY GENERAL: Performed by: STUDENT IN AN ORGANIZED HEALTH CARE EDUCATION/TRAINING PROGRAM

## 2024-03-14 RX ORDER — SODIUM CHLORIDE, SODIUM LACTATE, POTASSIUM CHLORIDE, CALCIUM CHLORIDE 600; 310; 30; 20 MG/100ML; MG/100ML; MG/100ML; MG/100ML
INJECTION, SOLUTION INTRAVENOUS CONTINUOUS
Status: ACTIVE | OUTPATIENT
Start: 2024-03-14 | End: 2024-03-15

## 2024-03-14 RX ADMIN — MUPIROCIN 1 APPLICATION: 20 OINTMENT TOPICAL at 14:42

## 2024-03-14 RX ADMIN — COLCHICINE 0.3 MG: 0.6 TABLET ORAL at 14:41

## 2024-03-14 RX ADMIN — SILDENAFIL 20 MG: 20 TABLET, FILM COATED ORAL at 20:05

## 2024-03-14 RX ADMIN — ATORVASTATIN CALCIUM 20 MG: 20 TABLET, FILM COATED ORAL at 20:05

## 2024-03-14 RX ADMIN — LEVOTHYROXINE SODIUM 100 MCG: 0.1 TABLET ORAL at 06:09

## 2024-03-14 RX ADMIN — PANTOPRAZOLE SODIUM 40 MG: 40 INJECTION, POWDER, FOR SOLUTION INTRAVENOUS at 14:41

## 2024-03-14 RX ADMIN — DIPHENHYDRAMINE HYDROCHLORIDE 50 MG: 25 CAPSULE ORAL at 06:26

## 2024-03-14 RX ADMIN — ACETAMINOPHEN 975 MG: 325 TABLET ORAL at 20:09

## 2024-03-14 RX ADMIN — PREDNISONE 50 MG: 50 TABLET ORAL at 00:23

## 2024-03-14 RX ADMIN — SILDENAFIL 20 MG: 20 TABLET, FILM COATED ORAL at 14:41

## 2024-03-14 RX ADMIN — MIRTAZAPINE 7.5 MG: 15 TABLET, FILM COATED ORAL at 20:05

## 2024-03-14 RX ADMIN — MOMETASONE FUROATE AND FORMOTEROL FUMARATE DIHYDRATE 2 PUFF: 100; 5 AEROSOL RESPIRATORY (INHALATION) at 20:06

## 2024-03-14 RX ADMIN — TIOTROPIUM BROMIDE INHALATION SPRAY 2 PUFF: 3.12 SPRAY, METERED RESPIRATORY (INHALATION) at 14:43

## 2024-03-14 RX ADMIN — IOHEXOL 210 ML: 350 INJECTION, SOLUTION INTRAVENOUS at 10:15

## 2024-03-14 RX ADMIN — PREDNISONE 50 MG: 50 TABLET ORAL at 06:26

## 2024-03-14 RX ADMIN — AMLODIPINE BESYLATE 10 MG: 10 TABLET ORAL at 14:40

## 2024-03-14 RX ADMIN — Medication 1000 UNITS: at 14:41

## 2024-03-14 RX ADMIN — MOMETASONE FUROATE AND FORMOTEROL FUMARATE DIHYDRATE 2 PUFF: 100; 5 AEROSOL RESPIRATORY (INHALATION) at 14:42

## 2024-03-14 RX ADMIN — SODIUM CHLORIDE, POTASSIUM CHLORIDE, SODIUM LACTATE AND CALCIUM CHLORIDE: 600; 310; 30; 20 INJECTION, SOLUTION INTRAVENOUS at 17:47

## 2024-03-14 RX ADMIN — PREDNISONE 20 MG: 20 TABLET ORAL at 08:42

## 2024-03-14 RX ADMIN — Medication 100 MCG: at 14:41

## 2024-03-14 ASSESSMENT — COGNITIVE AND FUNCTIONAL STATUS - GENERAL
WALKING IN HOSPITAL ROOM: 1 - TOTAL
CLIMB 3 TO 5 STEPS WITH RAILING: 1 - TOTAL
STANDING UP FROM CHAIR USING ARMS: 2 - A LOT
MOVING TO AND FROM BED TO CHAIR: 2 - A LOT

## 2024-03-14 NOTE — PLAN OF CARE
Care Coordination Discharge Plan Note     Discharge Needs Assessment  Concerns to be Addressed: discharge planning, care coordination/care conferences  Current Discharge Risk: chronically ill, physical impairment    Anticipated Discharge Plan  Anticipated Discharge Disposition: home with home health  Type of Home Care Services: nursing, home PT, home OT        Patient Choice  Offered/Gave Vendor List: yes  Patient's Choice of Community Agency(s): [facility]    Patient and/or patient guardian/advocate was made aware of their right to choose a provider. A list of eligible providers was presented and reviewed with the patient and/or patient guardian/advocate in written and/or verbal form. The list delineates providers in the patient’s desired geographic area who are participating in the Medicare program and/or providers contracted with the patient’s primary insurance. The Medicare list and quality ratings were obtained from the Medicare.gov [medicare.gov] website.    ---------------------------------------------------------------------------------------------------------------------    Interdisciplinary Discharge Plan Review:  Participants:nursing, physical therapy, , occupational therapy, physician, dietitian/nutrition services, patient, social work/services    Concerns Comments: Per rounds, pt is likely stable for dc tomorrow home with [facility]. Agency updated. [id number]    Discharge Plan:   Disposition/Destination: Home Health Care - Other / Home  Discharge Facility:    Community Resources:      Discharge Transportation:  Is Out of Hospital DNR needed at Discharge: no  Does patient need discharge transport? No

## 2024-03-14 NOTE — PROGRESS NOTES
Patient: Arielle GUAJARDO Isidro  Location: [place] [address]  MRN:  [id number]  Today's date:  3/14/2024    Attempted to see patient for therapy. Unable due to patient at test or procedure (off floor at OT time of attempt. Pt for small bowel follow through today. F/u with OT Intervention as appropriate.).

## 2024-03-14 NOTE — PROGRESS NOTES
Heart Failure / Pulmonary Hypertension Progress Note    Interval History: Seen and examined lying in bed. She reports continued abdominal pain today, which is improved from yesterday. She reports continued passing of gas without BM yet. She denies light-headedness or shortness of breath.     Consult Background:   Ms. Felix is a 63 y.o. female with a past medical history of Pulmonary HTN, HTN, Raynaud's Disease, Cutaneous Systemic Scleroderma (dx 1998), ILD, PE (3/2023). She is a patient of Dr. Nguyễn. She was previously followed by Dr. Jos Valdez at [facility]. Echocardiogram from 4/21/2022 showed LVEF: 55-60%, mild aortic valve thickening, pulmonary artery systolic pressure: 52 mmHg and trivial pericardial effusion. LHC and RHC (separate occasions) over the last 5-10 years which showed normal hemodynamics and normal coronaries. She had a RHC 9/02/2020 showing top-normal right sided filling pressures with mild pulmonary hypertension, top-normal PVR and normal pulmonary capillary wedge pressure. The cardiac index was normal, seen below.     On 6/27/2022, she was in [facility] ER for chest pain. EKG: NSR without ischemic changes. hsTrop: 12->16, d-dimer: 0.56, CXR showed cardiomegaly and diffuse interstitial prominence.     Dr. Nguyễn ordered echocardiogram, which was completed on 8/22/2022 and showed estimated RVSP = 50-55 mmHg, normal-sized LV with normal LV systolic function. Estimated EF 55- 60%. Wall motion grossly normal, mild concentric left ventricular hypertrophy, grade I LV diastolic dysfunction, probably normal RV size and function.    At her initial visit on 10/11/2022, she reported that she felt very short of breath with minimal activity most of the time. She reports that this started about 1 year prior and had progressively gotten worse. She described PND and bendopnea. She reported that she experienced ankle swelling, worsened over the past year and usually worse towards the end of the night. She also  reported some swelling in her abdomen. She reported daily palpations. I recommended a RHC which was done 11/28/2022 and showed: Normal right sided filling pressures. Mildly elevated left sided filling pressures. Precapillary pulmonary hypertension with mean PA 36 mmHg.    She was hospitalized on 3/19/2023 at [facility] for PE diagnosed on V/Q scan. She reports that she had presented with left arm pain and heaviness. She states that she was started on Apixaban. One week after her last office visit (4/6/2023), she presented to [facility] [place] chest pain, epistaxis and hemoptysis. Echocardiogram showed: left ventricular cavity size normal with mild left ventricular hypertrophy and preserved systolic function. Estimated EF 65%. Chest CTA showed no evidence of PE; Apixaban was discontinued.     She had a repeat echocardiogram (6/2023) which showed: Normal left ventricular cavity size and mild concentric left ventricular hypertrophy. Normal systolic function. EF: 60%. Normal right ventricular structure and function. Mild tricuspid valve regurgitation with estimated RVSP: 55 mmHg. Compared to previous study from 4/14/2023, estimated right ventricular systolic pressure were higher.    At her last office visit on 7/25/2023, she reported that she was not sure if she received/started the Macitentan. She stated at the time we initiated it, her Pulmonologist also started her on a new medication (Mycophenolate), and she had significant GI intolerance with this. It was trialed at various doses but ultimately stopped. At that visit, I asked her to check if she had Macitentan and if she did to start it and monitor her SpO2.    She underwent V/Q scan in September 2023, which showed intermediate-to-high probability of pulmonary embolic disease, as a result she completed CTA Chest PE which showed: no evidence for filling defect or vessel cutoff to suggest pulmonary embolism. Enlargement of the pulmonary arteries compatible with  "pulmonary arterial hypertension was seen.    She started Macitentan around August or September 2023.    She was last seen in the office in February 2024.     She presented to [facility] on 3/3 with chest pressure, palpitations, nausea. She was noted to have hypoxia and tachypnea at presentation. Given moderate to large pericardial effusion, she was monitored in CCU and transferred out on 3/6. hsTroponin was not elevated. She remained hemodynamically stable while in CCU. She has been started on Colchicine and steroids without relief of multi-system pain. She is planned for CCTA this AM. Hypoxia and tachypnea have resolved.     ---    Rheumatology: [facility] ([facility])     Past Medical / Surgical History:  Pulmonary HTN, HTN, Raynaud's Disease, Cutaneous Systemic Scleroderma (dx 1998), ILD, PE (3/2023), Follicular Carcinoma of Thyroid, Tenosynovitis, de Quervain, h/o Hernia Repair, h/o Appendicitis, h/o Digit Amputation, H/o Total Thyroidectomy (Dr. Pacheco at [facility]; 4/2013)    Family & Social History: She is , she has two children. She works as an .     Tobacco: former 2005  EtOH: never   Illicits: never     Her brother has CAD with stent  1st cousin with SLE  Both parents had \"heart problems\"    Allergies:   Allergies   Allergen Reactions   • Aspirin Shortness of breath     Other reaction(s): Unknown  \"stop breathing\"     • Ibuprofen Shortness of breath     Stop breathing   • Iodine Shortness of breath     Other reaction(s): Unknown   • Iodine And Iodide Containing Products Shortness of breath     ? rash   • Penicillins Shortness of breath     Other reaction(s): Unknown   • Sulfa (Sulfonamide Antibiotics) Angioedema   • Clindamycin    • Hydrochlorothiazide      Other reaction(s): Abdominal Pain   Slow heart rate   • Oxycodone      Other reaction(s): Vomiting   • Sulfamethoxazole-Trimethoprim      Other reaction(s): Vomiting, Weakness    • Latex Rash       Medications:   Current Facility-Administered Medications "   Medication Dose Route Frequency Provider Last Rate Last Admin   • acetaminophen (TYLENOL) tablet 975 mg  975 mg oral q6h PRN Brady Kim MD   975 mg at 03/13/24 0806   • amLODIPine (NORVASC) tablet 10 mg  10 mg oral q AM Patricia Haley DO   10 mg at 03/13/24 0807   • [Provider Managed Hold] amLODIPine (NORVASC) tablet 5-10 mg  5-10 mg oral Nightly PRN Dee Arellano DO       • atorvastatin (LIPITOR) tablet 20 mg  20 mg oral Daily (6p) Wolf Bernard MD   20 mg at 03/13/24 1727   • [Provider Managed Hold] bisacodyL (DULCOLAX) 10 mg suppository 10 mg  10 mg rectal Daily Brady Kim MD   10 mg at 03/12/24 0421   • cholecalciferol (vitamin D3) tablet 1,000 Units  1,000 Units oral Daily Dee Arellano DO   1,000 Units at 03/13/24 0806   • clopidogreL (PLAVIX) tablet 75 mg  75 mg oral Daily Wolf Bernard MD       • colchicine (COLCRYS) tablet 0.3 mg  0.3 mg oral Daily Octavio Giordano MD   0.3 mg at 03/13/24 0806   • cyanocobalamin (VITAMIN B12) tablet 100 mcg  100 mcg oral Daily Dee Arellano DO   100 mcg at 03/13/24 0806   • glucose chewable tablet 16-32 g of dextrose  16-32 g of dextrose oral PRN Dee Arellano DO        Or   • dextrose 40 % oral gel 15-30 g of dextrose  15-30 g of dextrose oral PRN Dee Arellano DO        Or   • glucagon (GLUCAGEN) injection 1 mg  1 mg intramuscular PRN Dee Arellano DO        Or   • dextrose 50 % in water (D50) injection 12.5 g  25 mL intravenous PRN Dee Arellano DO       • levothyroxine (SYNTHROID) tablet 100 mcg  100 mcg oral Daily Dee Arellano DO   100 mcg at 03/14/24 0609   • lidocaine (ASPERCREME) 4 % topical patch 1 patch  1 patch Topical Daily Sherita Panchal MD   1 patch at 03/10/24 0954   • [Provider Managed Hold] loperamide (IMODIUM) capsule 2 mg  2 mg oral 3x daily PRN Wolf Bernard MD   2  mg at 03/08/24 2051   • meclizine (ANTIVERT) chewable tablet 25 mg  25 mg oral 3x daily PRN Wagner Chen MD   25 mg at 03/05/24 1817   • metoprolol (LOPRESSOR) injection 5 mg  5 mg intravenous PRN Timothy Arvizu DO   5 mg at 03/07/24 1215   • metoprolol tartrate (LOPRESSOR) tablet 50 mg  50 mg oral q30 min PRN Timothy Arvizu DO   50 mg at 03/07/24 1000   • mirtazapine (REMERON) tablet 7.5 mg  7.5 mg oral Nightly Wolf Bernard MD   7.5 mg at 03/13/24 2002   • mometasone-formoterol (DULERA 100) 100-5 mcg/actuation inhaler 2 puff  2 puff inhalation BID (8a, 8p) Dee Arellano, DO   2 puff at 03/13/24 2002    And   • tiotropium bromide (SPIRIVA RESPIMAT) 2.5 mcg/actuation inhaler 2 puff  2 puff inhalation Daily (8a) Dee Arellano DO   2 puff at 03/12/24 0839   • mupirocin (BACTROBAN) 2 % ointment 1 Application  1 Application Topical Daily Dee Arellano DO   1 Application at 03/13/24 0813   • ondansetron ODT (ZOFRAN-ODT) disintegrating tablet 4 mg  4 mg oral q8h PRN Dee Arellano, DO   4 mg at 03/11/24 1621    Or   • ondansetron (ZOFRAN) injection 4 mg  4 mg intravenous q8h PRN Dee Arellano, DO   4 mg at 03/12/24 0143   • pantoprazole (PROTONIX) injection 40 mg  40 mg intravenous q24h Wolf Bernard MD       • predniSONE (DELTASONE) tablet 20 mg  20 mg oral Daily Wolf Bernard MD   20 mg at 03/14/24 0842   • Pt Own- Macitentan (OPSUMIT) tablet 10mg  10 mg oral Daily Dee Arellano, DO   10 mg at 03/13/24 0807   • [Provider Managed Hold] senna (SENOKOT) tablet 2 tablet  2 tablet oral Nightly Brady Kim MD   2 tablet at 03/12/24 0420   • sildenafiL (pulm.hypertension) (REVATIO) tablet 20 mg  20 mg oral TID Octavio Giordano MD   20 mg at 03/13/24 2002       Review of Systems:   All other systems reviewed and are negative except as per HPI.    Physical Exam:     Wt Readings from Last 10 Encounters:    03/14/24 43 kg (94 lb 12.8 oz)   02/01/24 47.6 kg (105 lb)   07/25/23 50.8 kg (112 lb)   07/25/23 50.8 kg (112 lb)   06/21/23 51.3 kg (113 lb)   04/19/23 51.7 kg (113 lb 14.4 oz)   04/06/23 52.6 kg (116 lb)   01/18/23 54.4 kg (120 lb)   11/28/22 55.3 kg (122 lb)   10/07/22 56.2 kg (124 lb)       BP Readings from Last 5 Encounters:   03/14/24 (!) 141/65   02/01/24 120/70   07/25/23 130/80   07/25/23 134/80   06/21/23 132/85       Vitals:    03/14/24 0743   BP: (!) 141/65   Pulse: (!) 58   Resp:    Temp: 36.8 °C (98.3 °F)   SpO2: 96%       Body mass index is 15.3 kg/m².  Body surface area is 1.42 meters squared.    Constitutional: NAD, AAOx3  HENT: Normocephalic, atraumatic head, sclerae anicteric, no cervical lymphadenopathy, trachea midline  Cardiovascular: RRR, no murmurs, rubs or gallops, JVP: 7-8 cm H2O   cm H2O, no carotid bruits.  Respiratory: CTA bilateral lung fields, no wheezes, rales or rhonchi  GI: soft, non-tender/non-distended  Musculoskeletal / Extremities: no peripheral edema, +2 pulses bilateral radial, DP/PT arteries, skin tightening on face and fingertips  Skin: no rashes  Neuro / Psych: no focal deficits, CNII-XII grossly intact, appropriate, cooperative    Diagnostic Data:     Results from last 7 days   Lab Units 03/14/24  0410 03/13/24  0846 03/12/24  0723   SODIUM mEQ/L 138 138 137   POTASSIUM mEQ/L 4.5 4.6 4.7   CHLORIDE mEQ/L 106 107 107   CO2 mEQ/L 24 22 22   BUN mg/dL 23 24 23   CREATININE mg/dL 0.9 0.9 0.9   CALCIUM mg/dL 9.0 9.0 8.9   ALBUMIN g/dL 3.1* 3.0* 3.1*   BILIRUBIN TOTAL mg/dL 0.3 0.3 0.3   ALK PHOS IU/L 49 49 50   ALT IU/L 14 15 16   AST IU/L 12* 12* 15   GLUCOSE mg/dL 81 95 77     Results from last 7 days   Lab Units 03/14/24  0410 03/13/24  0846 03/12/24  0723   WBC K/uL 12.71* 9.30 12.67*   HEMOGLOBIN g/dL 9.3* 9.1* 9.8*   HEMATOCRIT % 30.7* 29.6* 31.8*   MCV fL 87.7 86.0 86.6   PLATELETS K/uL 287 290 287     Component      Latest Ref Rng 4/13/2023 3/3/2024 3/12/2024    Lactate      0.4 - 2.0 mmol/L 1.0  1.1  0.9      Component      Latest [facility] 4/13/2023 2/27/2024 3/3/2024   BNP      <=100 pg/mL 105 (H)  82  119 (H)       Component      Latest [facility] 3/3/2024   High Sens Troponin I      <15.0 pg/mL 12.6    High Sens Troponin I       12.2      Component      Latest [facility] 3/4/2024   CRP      <7.00 mg/L 8.90 (H)       Component      Latest [facility] 3/4/2024   Sed Rate      0 - 30 mm/hr 119 (H)       Component      Latest [facility] 3/3/2024   D-Dimer, Quant      0.00 - 0.50 ug/mL FEU 0.68 (H)       Component      Latest [facility] 4/18/2023 9/27/2023   Sodium      134 - 144 mmol/L 139  140    Potassium, Bld      3.5 - 5.2 mmol/L 4.4  4.1    Chloride      96 - 106 mmol/L 108  107 (H)    CO2      20 - 29 mmol/L 23  20    BUN      8 - 27 mg/dL 26 (H)  18    Creatinine      0.57 - 1.00 mg/dL 1.1  0.96    Glucose      70 - 99 mg/dL 82  83    Calcium      8.7 - 10.3 mg/dL 9.1  9.5    eGFR      >59 mL/min/1.73 >60.0  67      Component      Latest [facility] 4/13/2023   Hemoglobin A1C      <5.7 % 4.4      Component      Latest [facility] 4/13/2023 4/18/2023   WBC      3.80 - 10.50 K/uL 7.12  6.95    Hemoglobin      11.8 - 15.7 g/dL 10.6 (L)  10.1 (L)    Hematocrit      35.0 - 45.0 % 34.3 (L)  33.0 (L)    MCV      83.0 - 98.0 fL 87.7  90.4    Platelets      150 - 369 K/uL 267  256        Component      Latest [facility] 4/14/2023   Triglycerides      30 - 149 mg/dL 44    Cholesterol      <=200 mg/dL 194    HDL      >=55 mg/dL 49 (L)    LDL Calculated      <=100 mg/dL 136 (H)    Non-HDL, Calculated      mg/dL 145    RISK      <=5.0  4.0       Component      Latest [facility] 4/13/2023   High Sens Troponin I      <15.0 pg/mL 23.0 (H)       Component      Latest Ref Rng 4/14/2023   Ferritin      11 - 250 ng/mL 75      Component      Latest Ref Rng 4/13/2023   TSH      0.34 - 5.60 mIU/L 4.71      Component      Latest Ref Rng 4/14/2023   Iron      35 - 150 ug/dL 29 (L)    TIBC      270 - 460 ug/dL 245 (L)     UIBC      180 - 360 ug/dL 216    Iron Saturation      15 - 45 % 12 (L)        Component      Latest Ref Rng 6/27/2022 4/13/2023   BNP      <=100 pg/mL 111 (H)  105 (H)                                     ---    ECG (3/3/2024, personally reviewed):   Sinus tachycardia   Nonspecific ST and T wave abnormality   HR: 129 bpm     ---    CTA Abd / Pelvis (3/13/2024, personally reviewed):   There is dilatation of the of proximal/mid small bowel with relatively abrupt  transition left hemiabdomen, likely obstruction. Questionable lesion at the  transition point. This could be better evaluated with CT or MRI enterography.     Aorta and major branches patent. Superior mesenteric artery patent without  significant stenosis.  Suspect moderate stenosis of the origins of the of renal arteries bilaterally.     Large pericardial effusion, no change.     Patchy opacities lower lungs, similar to prior.     TTE (3/4/2024, personally reviewed):   • Normal-sized left ventricle. Mild left ventricular hypertrophy. Preserved systolic function. LVEF 60%. No regional wall motion abnormalities. Grade II diastolic dysfunction.  Normal global longitudinal strain (-20%).  • The aortic valve is not well seen.  Cannot determine the number of cusps.  Sclerotic leaflets.  No aortic stenosis.  Trace aortic insufficiency.  • Normal sized aortic root.  The visualized portion of the ascending aorta is normal in size.  • The mitral valve leaflets are thickened. Mild mitral annular calcification. Trace mitral regurgitation.   • Mildly dilated left atrium.  • Normal-sized right ventricle. Normal right ventricular systolic function.  • Thickened tricuspid valve. There is mild tricuspid regurgitation with estimated RVSP of 46 mmHg.   • Pulmonic valve is not well visualized. Trace pulmonic regurgitation.   • Mildly dilated right atrium.  • IVC is enlarged with <50% respiratory variation consistent with elevated right atrial filling pressure (at least 15  mmHg).   • Moderate, circumferential, circumferential pericardial effusion.  The largest pocket is located inferolaterally.  Elevated right atrial pressure.  No other clear echocardiographic features of increased pericardial pressure.  Correlate clinically.   • Compared to previous TTE from 6/21/2023, pericardial effusion now moderate in size.  Right atrial pressure now elevated.       CTA Chest (3/3/2024):   IMPRESSION:  1.  No evidence of acute pulmonary embolism to the level of the segmental  branches.  2.  Moderate to large pericardial effusion measuring higher than simple fluid  density.  3.  Lower lobe lung field predominant interstitial thickening with some relative  subpleural sparing, consistent with a chronic interstitial lung disease,  unchanged from the prior study.  4.  Continued bilateral axillary, mediastinal, and bilateral hilar  lymphadenopathy, not definitely changed from the prior study, possibly related  to the patient's sarcoid.  5.  Emphysema.     CTA Chest PE (10/4/2023):  IMPRESSION:  1. No evidence for filling defect or vessel cutoff to suggest pulmonary  embolism.  Enlargement of the pulmonary arteries compatible with pulmonary  arterial hypertension.  2. Changes throughout the lungs as described above which can be seen with  systemic sclerosis/scleroderma.  Underlying pneumonia the lung bases cannot be  entirely excluded in the proper clinical setting.  3.  Additional stable findings as described above.        VQ (9/12/2023):  IMPRESSION:  Intermediate to high probability of pulmonary embolic disease.     6MWT (7/25/2023, on Sildenafil 20 mg TID):        TTE (6/21/2023, personally reviewed):  1. Normal left ventricular cavity size and mild concentric left ventricular hypertrophy. Normal systolic function. EF: 60%. No regional wall motion abnormalities. Grade I diastolic dysfunction.   2. Aortic valve cusps not well visualized. Trace aortic regurgitation. Aortic valve and root  sclerosis.  3. Thickened mitral valve leaflets. Mild mitral annular calcification. Trace mitral regurgitation. Mildly dilated left atrial size.   4. Normal right ventricular structure and function. Mild tricuspid valve regurgitation with estimated RVSP: 55 mmHg (assuming right atrial pressure of 3 mmHg). Normal right atrial size. Pulmonic valve not well visualized. Trace pulmonic valve regurgitation. Pulsed wave Doppler of the RVOT systolic profile show decreased acceleration times ( msec) and late systolic notching consistent with elevated PVR.   5. IVC size is normal with > 50% inspiratory collapse consistent with normal right atrial pressure. Small pericardial effusion without echocardiographic evidence of tamponade.  6. Compared to previous study from 4/14/2023, estimated right ventricular systolic pressure is higher.        TTE (4/14/2023, personally reviewed):   • The left ventricular cavity size is normal with mild left ventricular hypertrophy and preserved systolic function. Estimated EF 65%. No regional wall motion abnormalities. Grade I diastolic dysfunction.     • The aortic valve is tricuspid. Aortic valve sclerosis. Trace aortic regurgitation.      • The visualized portions of the aortic root and ascending aorta are normal in size.     • The mitral valve is mildly thickened. Mild mitral annular calcification. Trace mitral regurgitation.       • The left atrium is severely dilated.      • The right ventricle is normal in size with preserved systolic function     • The pulmonic valve is not well seen.     • The tricuspid valve is normal in appearance. mild tricuspid regurgitation with an estimated RVSP of 34 mmHg.       • Right atrium is normal in size.     • The IVC is small and collapses > 50% with inspiration consistent with normal right atrial pressures.     • Small pericardial effusion, mostly posterior.       • Compared to previous echocardiogram from 8/22/2022, there is no significant  change        TTE (11/28/2022, personally reviewed):   • Normal right- and mildly elevated left-sided filling pressures (RA: 3 mmHg, PCWP: 13 mmHg)  • Elevated pulmonary artery pressures (60/22(35 mmHg)) in the setting of PCWP: 13 mmHg.   • Normal cardiac output and index (CO: 5.1 L/min, CI: 3.2 L/min/m2)  • PVR: 4.3 Wood units. SVR: 1840 dynes/sec/cm5      TTE (8/22/2022, personally reviewed):  · Normal-sized LV. Normal LV systolic function. Estimated EF 55- 60%. Wall motion appears grossly normal. Mild concentric left ventricular hypertrophy. Grade I LV diastolic dysfunction.  · Pulmonic valve not well visualized. Grossly normal pulmonic valve structure. Trace pulmonic valve regurgitation. No pulmonic valve stenosis.  · Aortic valve not well visualized. Aortic valve not well seen but likely trileaflet. Focal aortic valve calcification present. Sclerotic aortic valve leaflets. Mild aortic valve regurgitation. No aortic valve stenosis.  · RV not well visualized. Normal-sized RV. Normal RV systolic function.  · Normal-sized RA.  · There is a small loculated pericardial effusion anterior to the right atrium. No cardiac tamponade.  · Sclerotic mitral valve. Mitral valve calcification of the anterior leaflet present.  · Trace mitral valve regurgitation.  · No significant mitral valve stenosis.  · Normal-sized IVC. IVC demonstrates normal respiratory collapse.  · Tricuspid valve structure is grossly normal. Mild to moderate tricuspid valve regurgitation.  · Estimated RVSP = 50-55 mmHg.  · Mildly dilated LA.  · No previous echocardiogram available for comparison.     TTE (4/21/2022, outside study):  This result has an attachment that is not available.   •  A complete transthoracic echocardiogram (including 2D, color flow   Doppler, spectral Doppler and M-mode imaging) was performed using the   standard protocol. The study quality was adequate.   •  The left ventricle is normal in size. There is moderately increased   wall  thickness consistent with moderate concentric hypertrophy.   Endocardium is incompletely visualized, but no regional wall motion   abnormalities are noted in the visualized segments. Normal left   ventricular ejection fraction. The left ventricular ejection fraction by   visual estimate is 55% to 60%.   •  The right ventricle is normal in size. There is normal function of the   right ventricle.   •  The left atrium is normal in size. Left atrial volume is 58 mL.   •  The right atrial volume measures 52 mL. The right atrial volume index   measures 34 mL/m2.   •  There is trace mitral regurgitation. There is no mitral stenosis.   •  The aortic valve is trileaflet. There is mild aortic valve thickening.   There is no aortic stenosis. There is trace aortic regurgitation.   •  There is mild to moderate tricuspid regurgitation. Pulmonary artery   systolic pressure measures 52 mmHg. The pulmonary artery systolic pressure   is moderately elevated.   •  The aorta measures 3.2 cm at the sinus of Valsalva. The proximal   segment of the ascending aorta is mildly enlarged. The proximal segment of   the ascending aorta measures 3.4 cm. The proximal segment of the ascending   aorta measures 2.2 cm/m2, indexed for body surface area.   •  Trivial pericardial effusion present. There is no echocardiographic   evidence of cardiac tamponade.   •  The inferior vena cava is normal in size (diameter <21 mm) and   decreases >50% in size with inspiration, suggesting a normal right atrial   pressure of 3 mmHg (range 0-5 mm Hg).   •  Compared to the prior study of 3/13/2020, there are no significant   changes.        PFT (3/23/2022):      PFT (7/14/2021):      CT Chest (5/2/2021, outside study):  CLINICAL INFORMATION: Systemic sclerosis. Interstitial lung disease. Groundglass nodule     PROCEDURE: Unenhanced CT of the chest was performed using thin section reconstructions. Images were obtained on inspiration and expiration.     COMPARISON:  June and August 2020     FINDINGS:     LUNGS, PLEURA:     Groundglass opacities with reticulation with subpleural sparing in the lower lobes, without honeycombing or architectural distortion, unchanged.     Right upper lobe groundglass nodule, image 114 of series 3, 8 x 11 mm, previously 6 mm.     Expiratory images are of diagnostic quality and do not demonstrate evidence of clinically significant air trapping.     CARDIOVASCULAR, MEDIASTINUM, THYROID: Coronary calcifications. Trace pericardial effusion.     LYMPH NODES: Subcentimeter mediastinal lymph nodes, unchanged. Unchanged axillary lymph nodes.     SKELETON, CHEST WALL: No destructive bone lesion     UPPER ABDOMEN: Patulous esophagus. 3 mm right renal stone.       RHC (9/02/2020):  RA   8 mmHg   RV   40/5 mmHg   PA (mean)  44/16 (25) mmHg   PCWP   12 mmHg   CO   4.65 lpm (Thermodilution)   CI   2.65 lpm/m-squared   SVI    33 ml/beat/m-squared (lower limit normal 29)   PVR   2.8 mmHg/l/min (Wood units)   SVR   2064 dyne-sec-cm-5         PFT (3/18/2020):      CCTA (3/7/2024, personally reviewed):      LEFT MAIN: Patent.     LAD: Proximal: Calcified plaque with 30 to 50% stenosis.  Mid: Mixed plaque with  50 to 70% stenosis, CT FFR 0.89.  Distal: Patent.  D1: Patent  D2: Patent, CT FFR 0.88     LCX: The circumflex and 1 marginal branch are patent with normal CT FFR.     RCA: Proximal: Calcified plaque with minimal stenosis.  Otherwise the RCA, PDA,  and PLB are patent with normal CT FFR.     CORONARY CALCIUM COMPOSITE AGATSTON SCORE: 313  LM: LAD: 312    LCX: RCA: 1     This places the patient in the HICKS 96th risk percentile for age and gender  matched individuals.     VENTRICULAR FUNCTION AND WALL MOTION     LV EJECTION FRACTION: 66%  LV WALL MOTION: Normal    SUMMARY:  1. Two-vessel coronary artery disease as above that is moderate in severity.  CT  FFR is normal..  2. There is mitral annular calcification and aortic sclerosis.  The right atrium  is enlarged.   There is left ventricular hypertrophy.  There is a moderate to  large circumferential pericardial effusion.  3. Normal left ventricular wall motion and systolic function.  4. Coronary calcium score 313, 96th percentile         TTE (3/13/2020, outside study):  · The study quality was good.   · The left ventricle is normal in size. Top normal left ventricular wall   thickness. There are no segmental wall motion abnormalities. The left   ventricular ejection fraction is 55-60% by visual estimate and 3D   echocardiography. Normal left ventricular ejection fraction.   · There is grade I (mild) LV diastolic dysfunction.   · The right ventricle is normal in size. There is normal function of the   right ventricle.   · The right atrium is mildly dilated.   · There is mild mitral valve anterior leaflet thickening. There is mild   mitral valve leaflet calcification. There is flattening of the mitral   leaflets without raphael prolapse. There is trace mitral regurgitation.   · The aortic valve is trileaflet. There is mild thickening of the aortic   valve. There is mild calcification of the aortic valve. There is no aortic   /stenosis. There is trace aortic regurgitation.   · There is mild tricuspid valve leaflet thickening. There is mild to   moderate tricuspid regurgitation. The pulmonary artery systolic pressure   is moderately elevated. Pulmonary artery systolic pressure measures 50   mmHg. There is mid-systolic notching of the RVOT VTI consistent with   elevated pulmonary vascular resistance.   · The inferior vena cava is normal in size (diameter <21 mm) and decreases   >50% in size with inspiration, suggesting a normal right atrial pressure   of 3 mmHg (range 0-5 mm Hg).   · Trivial loculated pericardial effusion present adjacent to the right   ventricle and adjacent to the right atrium.          Assessment / Plan:     1. Musculoskeletal Pain Including Chest Pain  - Suspect this is auto-immune disease related inflammation;  although, there has not been demonstrable improvement with Colchicine or steroids to date. Agree with steroid course.   - Consideration for Rheumatology consultation. As an outpatient, revisiting immune system suppression through biologics (or non-biologics) is reasonable.   - CCTA revealed moderate non-obstructive CAD, calcium score of 313  - Reports resolved diarrhea with lower Colchicine 0.3 mg daily.     2. Pericardial Effusion  - Moderate in size without clinical tamponade  - Will recheck TTE at one month if no interim clinical change - hemodynamically stable  - Continue Colchicine 0.3 mg daily    3. Pulmonary Hypertension (WHO Group I, NYHA/WHO FC III):   - Occurring in the background of Systemic Scleroderma with ILD. August 2022 TTE showed normal RV size and function, but progressive exertional decline, worsened DLCO and resting tachycardia suggest there may be progression of her pulmonary vascular disease and limitation of cardiac output. This was proven with 11/2022 RHC showing mean PA 35 mmHg (with PCWP 13 mmHg) and PVR 4.3 Wood units. June 2023 TTE showed increase in RVSP. New TTE with RVSP decreased to 40s (even with higher estimated RAP).  - Continue uninterrupted Sildenafil and Macitentan.     4. HTN:   - Mostly controlled  - Continued Amlodipine.     5. Systemic Scleroderma / Raynaud's Disease:   - Per Rheumatology (Outpatient: Dr. Giraldo).      6. ILD:   - Per Pulmonology and Rheumatology (Dr. Giraldo) outpatient.   - She has not tolerated trials of Mycophenolate.     7. PE:   - She has had elevated D-dimer and elevated probability on V/Q scans, but CTA Chest has consistently not shown evidence for acute or chronic thromboembolism. Apixaban has been discontinued.     8. CAD  - LAD and RCA non-obstructive disease on 3/7 CCTA  - LDL goal < 70  - Continue Clopidogrel, no ASA due to allergy.   - Continue Atorvastatin 20 mg daily      Timothy Arvizu DO

## 2024-03-14 NOTE — PROGRESS NOTES
Internal Medicine  Daily Progress Note       SUBJECTIVE   This is a 63 y.o. year-old female admitted on 3/3/2024 with Pericardial effusion [I31.39]  Interstitial lung disease (CMS/HCC) [J84.9]  Hypoxia [R09.02].    Interval History:   Patient was hemodynamically stable overnight. She is still NPO and she received her prep prior to her small bowel follow through today. She is passing flatus but no bowel movements. No nausea or vomiting.      OBJECTIVE   Vital signs in last 24 hours:  Temp:  [36.5 °C (97.7 °F)-36.9 °C (98.5 °F)] 36.8 °C (98.3 °F)  Heart Rate:  [54-68] 58  Resp:  [18] 18  BP: (122-141)/(59-65) 141/65  SpO2:  [94 %-99 %] 96 %  Oxygen Therapy: None (Room air)    Weight & I/Os:  Weights (last 5 days)     Date/Time Weight    03/14/24 0630 43 kg (94 lb 12.8 oz)    03/13/24 0555 43.5 kg (96 lb)    03/12/24 0611 46.4 kg (102 lb 4.8 oz)    03/11/24 0539 46.5 kg (102 lb 8.2 oz)    03/10/24 0555 44.8 kg (98 lb 12.8 oz)    03/09/24 0608 42 kg (92 lb 9.5 oz)          Intake/Output Summary (Last 24 hours) at 3/14/2024 0659  Last data filed at 3/13/2024 1003  24 Hour Net Input/Output from 7AM Yesterday   Intake 10 ml   Output --   Net 10 ml        PHYSICAL EXAMINATION   Physical Exam  Cardiovascular:      Rate and Rhythm: Normal rate and regular rhythm.      Pulses: Normal pulses.      Heart sounds: Normal heart sounds.   Pulmonary:      Effort: Pulmonary effort is normal.      Breath sounds: Normal breath sounds.   Abdominal:      Comments: Diffusely tender abdomen    Musculoskeletal:      Right lower leg: No edema.      Left lower leg: No edema.   Neurological:      Mental Status: She is alert. Mental status is at baseline.          LINES, CATHETERS, DRAINS, AIRWAYS, & WOUNDS   Lines, drains, airways, & wounds:  Peripheral IV (Adult) 03/11/24 Left;Posterior Forearm (Active)   Number of days: 3       Peripheral IV (Adult) 03/12/24 Anterior;Proximal;Right Forearm (Active)   Number of days: 2       Wound Other  (comment) Anterior;Left Toe (Great) (Active)   Number of days: 10       Wound Pressure Injury Anterior;Left Ankle (Active)   Number of days: 10       Wound Pressure Injury Anterior;Right Ankle (Active)   Number of days: 10       Wound Other (comment) Anterior;Right Toe (2nd) (Active)   Number of days: 10        LABS, IMAGING, & TELE   Labs:  I have reviewed the patient's pertinent labs. Pertinent labs are within normal limits.    Results from last 7 days   Lab Units 03/14/24  0410 03/13/24  0846 03/12/24  0723   WBC K/uL 12.71* 9.30 12.67*   HEMOGLOBIN g/dL 9.3* 9.1* 9.8*   HEMATOCRIT % 30.7* 29.6* 31.8*   PLATELETS K/uL 287 290 287       Results from last 7 days   Lab Units 03/13/24  0846 03/12/24  0723 03/11/24  1730   SODIUM mEQ/L 138 137 136   POTASSIUM mEQ/L 4.6 4.7 5.4*   CHLORIDE mEQ/L 107 107 105   CO2 mEQ/L 22 22 23   BUN mg/dL 24 23 24   CREATININE mg/dL 0.9 0.9 0.9   CALCIUM mg/dL 9.0 8.9 9.1   ALBUMIN g/dL 3.0* 3.1* 3.5   BILIRUBIN TOTAL mg/dL 0.3 0.3 0.3   ALK PHOS IU/L 49 50 58   ALT IU/L 15 16 17   AST IU/L 12* 15 25   GLUCOSE mg/dL 95 77 128*     Imaging personally reviewed (does not include unread studies):  CT ANGIOGRAPHY ABDOMEN PELVIS WITH AND WITHOUT IV CONTRAST    Addendum Date: 3/13/2024    COMMUNICATION: Findings discussed with Dr. Cunningham  by Dr. Rossi 3/13/2024 11:44 AM.    Result Date: 3/13/2024  CLINICAL HISTORY: Mesenteric ischemia. Pain. TECHNIQUE: CTA abdomen and pelvis: Helical acquisition in the arterial phase after administration of intravenous contrast. 80mL of iopamidoL ([id number]) 370 mg iodine /mL (76 %) injection 80 mL Delayed CT of the abdomen also performed. No oral contrast. Coronal and sagittal reconstructions also performed. MIP reforms also created separate workstation. MIP reconstructions with 2-D and/ or 3-D reformatted imaging was performed to better evaluate the vasculature CT DOSE:  One or more dose reduction techniques (e.g. automated exposure control, adjustment  of the mA and/or kV according to patient size, use of iterative reconstruction technique) utilized for this examination COMPARISON: CT abdomen and pelvis 3/11/2024 FINDINGS: ABDOMEN: - Lower chest: Large pericardial effusion, no change. Patchy opacities lower lungs, not significantly changed. -Liver: No mass or any significant abnormality. -Gallbladder: No calcified gallstones. -Bile Ducts: No significant biliary ductal dilatation. -Spleen:  No splenomegaly or focal lesion. -Pancreas: No mass, ductal dilatation, or inflammatory changes. -Kidneys: No solid mass or hydronephrosis, or any definite calculi. -Adrenals:  No nodules. -Lymph Nodes: No adenopathy. -Vascular: Atherosclerotic changes. Aorta normal in caliber. Common and external iliac arteries patent without significant stenosis. The celiac and superior mesenteric arteries are patent without significant stenosis. Inferior mesenteric artery patent. Renal arteries patent. Likely moderate stenosis of the origins of the renal arteries bilaterally. Jag enhancement of the kidneys. -Abdominal Wall: No hernia, fluid collection, or any significant findings. -Bones: No acute findings BOWEL/MESENTERY: Moderately dilated small bowel left hemiabdomen, measuring up to approximately 3.5 cm. Relatively abrupt transition left hemiabdomen (image 60 series 1). Large bowel normal in caliber. No ascites.  No collection.  No free air. PELVIS: -Bladder: Unremarkable -Reproductive Organs:No gross mass. -Lymph Nodes: within normal limits. -Miscellaneous: Small volume free fluid.     IMPRESSION: There is dilatation of the of proximal/mid small bowel with relatively abrupt transition left hemiabdomen, likely obstruction. Questionable lesion at the transition point. This could be better evaluated with CT or MRI enterography. Aorta and major branches patent. Superior mesenteric artery patent without significant stenosis. Suspect moderate stenosis of the origins of the of renal  arteries bilaterally. Large pericardial effusion, no change. Patchy opacities lower lungs, similar to prior. Note to emergency department Actionable Finding: Follow up should be considered.      ASSESSMENT & PLAN   Nausea and vomiting  Assessment & Plan  Patient developed nausea and dark vomiting on 3/11 afternoon   CT abdomen showed dilated loops of bowel that could represent SBO vs ileus   Surgery team consulted and recommended CT abdomen with IV contrast  Ct abdomen possible obstruction    Patient NPO and given IV fluids   Planned for small bowel follow through today     Interstitial lung disease (CMS/HCC)  Assessment & Plan  Patient has a PMH of ILD, CTEPH and phtn on sildenafil, mecetentan, and scleroderma   Has been discussing Tocilizumab with rheumatology but hasn't started it yet   Echo: EF 60%, No regional wall abnormalities, Grade II diastolic dysfunction  Continue pain meds as able     Impression: confirmed ILD and precap phtn 2/2 scleroderma, pw worsening SHOB, cp, and new pericardial effusion     - continue home meds   - cont PTA amlodipine   - continue phtn meds as able   - macitanetan and sildenafil       Pulmonary hypertension (CMS/HCC)  Assessment & Plan  Patient has a pMH of pulmonary htn, scleroderma, CTEPh and precapillary pHTN   Sees Dr. Arvizu   Multiple echos and RHC and LHC   Endorses continued worsening SHOB since 10/2022 with BOWSER, palpitations, bendopnea   3/4: Echo: EF 60%, No regional wall abnormalities, Grade II diastolic dysfunction  Most recent echo 6/2023 showed EF 60%, mild TR, RVSP 55, increased RV systolic pressures compared to April   Had a PE in march 2023, was on elequis however had hemoptysis and it was discontinued   VQ in sept 2023 showed mod-high prob of PE disease and CTA PE showed no acute filling defect but enlargmeent of PA cw PHTN    recently started mecitentan aug/sept 2023 wo much subjective change     Ddx: multifactorial phtn scleroderma ILD vs. CTEPH vs. Pericardial  effusion vs. Combo thereof     Impression: 62yo lady with PMH of raynauds, phtn, cutaneous scleroderma, ILD, CTEPh pw worsening SHOB x 24 hours with new pericardial effusion seen on CT. Prior echos have shown increasing RVSP and worsening phtn, for which she is on sildenafil, mecitentan.     - continue sildenafil   - continue mecitentan   - cards on board   - closely monitor BP in ICU   - SCDs for DVT given hemoptysis            * Pericardial effusion  Assessment & Plan  Ddx: autoimmune vs. Myocardial injury vs. Bacterial infection vs. Viral vs. Malignant   Patient has a PMH of phtn, scleroderma, CTEPH   pw increasing chest pressure x 1 day, without radiation into arms  Endorses continued GERD sxs however no new NVD  S/p Methylprednisolone x1 in ED   CT shows pericardial effusion   Echo: EF 60%, No regional wall abnormalities, Grade II diastolic dysfunction  ; Trop 12.9   [id number] and CRP 8.9   MRSA Nares: Negative  TB panel negative 2/27   BCx: NGTD      - started colchicine but dropping dose due to diarrhea to colchicine 0.3.   - on low dose prednisone 20mg daily (as indomethacin alternative, pt reports allerges to ASA and Ibuprofen)   - coronary CTA showed moderate 2 vessel CAD. Recommend plavix and statin but patient refusing medications.   - TB panel 2/27 negative   - CBC daily  - CMP daily   - scds for dvt ppx given hemooptysis   - Allergist recommendations appreciated  - VS frequently       Hypothyroid  Assessment & Plan  Patient has a PMH of hypothyroidism 2/2 thyroidectomy for follicular carcinoma of the thyroid     Note H/o Total Thyroidectomy (Dr. Pacheco at [facility]; 4/2013)  - continue home thyroid medications Synthroid 100mcg qd    Acute on chronic heart failure with preserved ejection fraction (CMS/HCC)  Assessment & Plan  Patient has a PMH of HFPEF with EF 65%   Endorsing worsening HSOB, denies any ANGEL LUIS, UFD or medication complaince difficulty   CT shows pericardial effusion without pleural effusions  or pulmonary edema   Exam negative for ANGEL LUIS, crackles     Impression: 62 yo lady with PMH of scleroderma and PE, phtn on mecitentan and sildenafil pw worseing BOWSER and shob with new pericardial effusion on ct imaging, negative for pulmonary edema or other s/sxs of CHF exacerbation. Unlikely this is a CHF exacerbation.     - continue to monitor   - continue home medications     History of pulmonary embolism  Assessment & Plan  Patient has a PMH of phtn and PE tx w/ apixiban however cb epistaxis and hemoptysis   Stopped elequis in June 2023   pw worsening shob and new pericardial effusion   Continues to endorse hemoptysis and epistaxis worsening over last few days   Echo EF 60%, no regional wall motion abnormalities, grade II diastolic dysfunction    - scds for now for dvt ppx       Open wound of right foot  Assessment & Plan  Patient has multiple wounds noted on her feet bilaterally   Wound images from admission in the media tab   Is on CCB at home   States wounds have difficulty healing  Also now with poor nutritional status iso scleroderma and ILD     Ddx: nonhealing ulcers 2/2 autoimmune disease vs. 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 Has chronic nonhealing ulcers to her feet, most likely secondary to her scleroderma and poor nutritional status     - cont CCB  - wound ostomy on board   - consider pain management   - nutrition consult        VTE Assessment: Padua    VTE Prophylaxis: Current anticoagulants:    •None      Code Status: Full Code  Estimated discharge date: 3/14/2024         ATTENDING DOCUMENTATION  ALSO SEE ATTENDING ATTESTATION SECTION OF NOTE

## 2024-03-14 NOTE — PROGRESS NOTES
Patient: Arielle Hammondstower  Location: [place] [address]  MRN:  [id number]  Today's date:  3/14/2024    Attempted to see patient for therapy. Unable due to patient at test or procedure (pt at radiology, will follow up as able for PT).

## 2024-03-14 NOTE — PROGRESS NOTES
General Surgery Daily Progress Note  ACS [id number].    Subjective   NAEO. Was seen resting comfortably in bed.  Endorsing mild abdominal pain, without nausea, vomiting.  Passing flatus.  Received steroid prep for small bowel follow-through overnight.  Denies fever, chills.      Objective     Vital signs in last 24 hours:  Temp:  [36.5 °C (97.7 °F)-36.9 °C (98.5 °F)] 36.9 °C (98.5 °F)  Heart Rate:  [54-68] 61  Resp:  [18] 18  BP: (122-143)/(59-64) 134/64      Intake/Output Summary (Last 24 hours) at 3/14/2024 0620  Last data filed at 3/13/2024 2130  Gross per 24 hour   Intake 10 ml   Output 500 ml   Net -490 ml     Intake/Output this shift:  I/O this shift:  In: -   Out: 500 [Urine:500]    Physical Exam  General: awake and alert, well appearing, no acute distress  HEENT: normocephalic, atraumatic, EOM intact, conjunctiva clear, sclera nonicteric  CV: normal rate, normotensive  Pulm: normal work of breathing, no acute distress  Abd: Soft, minimal distention, diffusely tender to palpation, worse in lower quadrants  Skin: incisions CDI, no surrounding erythema  Extr: no obvious abnormality, moving extremities spontaneously  Neuro: Grossly normal              VTE Assessment: I have reassessed and the patient's VTE risk and treatment plan is appropriate.    Labs:  BMP Results       03/12/24 03/11/24 03/11/24     0723 1730 0628     136 139    K 4.7 5.4 4.5    Cl 107 105 109    CO2 22 23 21    Glucose 77 128 67    BUN 23 24 24    Creatinine 0.9 0.9 0.9    Calcium 8.9 9.1 9.0    Anion Gap 8 8 9    EGFR >60.0 >60.0 >60.0         Comment for EGFR at 0723 on 03/12/24: Calculation based on the Chronic Kidney Disease Epidemiology Collaboration (CKD-EPI) equation refit without adjustment for race.    Comment for EGFR at 1730 on 03/11/24: Calculation based on the Chronic Kidney Disease Epidemiology Collaboration (CKD-EPI) equation refit without adjustment for race.    Comment for EGFR at 0628 on 03/11/24: Calculation based on the  Chronic Kidney Disease Epidemiology Collaboration (CKD-EPI) equation refit without adjustment for race.         CBC Results       03/12/24 03/11/24 03/11/24     0723 1730 0629    WBC 12.67 15.64 10.69    RBC 3.67 4.13 4.06    HGB 9.8 10.9 10.6    HCT 31.8 35.4 35.4    MCV 86.6 85.7 87.2    MCH 26.7 26.4 26.1    MCHC 30.8 30.8 29.9     327 311          Imaging  I have reviewed the Imaging from the last 24 hrs.      Assessment/Plan   Patient is a 63-year-old female with past medical history of scleroderma, ILD, GERD, PE (not on therapy 2/2 epistaxis and hemoptysis), pulmonary HTN on mecitentan/sildenafil, ventral hernia repair, appendectomy, tubal ligation who presented to the hospital on 3/3 with worsening SOB, chest pain, pressure over her sternum and palpitations. Patient was admitted for moderate to large pericardial effusion.CT abdomen pelvis demonstrated mildly dilated loops of small bowel, without definite discrete transition point. Possible early obstruction versus ileus.     - No acute surgical intervention indicated at this time  - CTA abdomen pelvis performed yesterday; SBFT this morning  - To remain NPO   - Multi-modal pain control  - IV-fluids  - f/u morning labs  - IS/OOB  -Rest of care per primary team      Nano Sidhu DO   PGY-1 Resident   General Surgery Service  Please page *ACS [phone] with any questions or concerns.

## 2024-03-15 ENCOUNTER — TELEPHONE (OUTPATIENT)
Dept: CARDIOLOGY | Facility: CLINIC | Age: 64
End: 2024-03-15
Payer: COMMERCIAL

## 2024-03-15 DIAGNOSIS — I31.39 PERICARDIAL EFFUSION: Primary | ICD-10-CM

## 2024-03-15 LAB
ALBUMIN SERPL-MCNC: 2.9 G/DL (ref 3.5–5.7)
ALP SERPL-CCNC: 44 IU/L (ref 34–125)
ALT SERPL-CCNC: 12 IU/L (ref 7–52)
ANION GAP SERPL CALC-SCNC: 8 MEQ/L (ref 3–15)
AST SERPL-CCNC: 10 IU/L (ref 13–39)
BILIRUB SERPL-MCNC: 0.3 MG/DL (ref 0.3–1.2)
BUN SERPL-MCNC: 29 MG/DL (ref 7–25)
CALCIUM SERPL-MCNC: 8.6 MG/DL (ref 8.6–10.3)
CHLORIDE SERPL-SCNC: 104 MEQ/L (ref 98–107)
CO2 SERPL-SCNC: 25 MEQ/L (ref 21–31)
CREAT SERPL-MCNC: 1 MG/DL (ref 0.6–1.2)
EGFRCR SERPLBLD CKD-EPI 2021: >60 ML/MIN/1.73M*2
ERYTHROCYTE [DISTWIDTH] IN BLOOD BY AUTOMATED COUNT: 20.2 % (ref 11.7–14.4)
GLUCOSE BLD-MCNC: 149 MG/DL (ref 70–99)
GLUCOSE BLD-MCNC: 178 MG/DL (ref 70–99)
GLUCOSE BLD-MCNC: 208 MG/DL (ref 70–99)
GLUCOSE BLD-MCNC: 93 MG/DL (ref 70–99)
GLUCOSE SERPL-MCNC: 73 MG/DL (ref 70–99)
HCT VFR BLD AUTO: 30.8 % (ref 35–45)
HGB BLD-MCNC: 9.5 G/DL (ref 11.8–15.7)
MCH RBC QN AUTO: 27 PG (ref 28–33.2)
MCHC RBC AUTO-ENTMCNC: 30.8 G/DL (ref 32.2–35.5)
MCV RBC AUTO: 87.5 FL (ref 83–98)
PDW BLD AUTO: 12.4 FL (ref 9.4–12.3)
PLATELET # BLD AUTO: 256 K/UL (ref 150–369)
POCT TEST: ABNORMAL
POCT TEST: NORMAL
POTASSIUM SERPL-SCNC: 4.1 MEQ/L (ref 3.5–5.1)
PROT SERPL-MCNC: 6.1 G/DL (ref 6–8.2)
RBC # BLD AUTO: 3.52 M/UL (ref 3.93–5.22)
SODIUM SERPL-SCNC: 137 MEQ/L (ref 136–145)
WBC # BLD AUTO: 14.2 K/UL (ref 3.8–10.5)

## 2024-03-15 PROCEDURE — 63700000 HC SELF-ADMINISTRABLE DRUG

## 2024-03-15 PROCEDURE — 85027 COMPLETE CBC AUTOMATED: CPT

## 2024-03-15 PROCEDURE — 63700000 HC SELF-ADMINISTRABLE DRUG: Performed by: STUDENT IN AN ORGANIZED HEALTH CARE EDUCATION/TRAINING PROGRAM

## 2024-03-15 PROCEDURE — 21400000 HC ROOM AND CARE CCU/INTERMEDIATE

## 2024-03-15 PROCEDURE — 99233 SBSQ HOSP IP/OBS HIGH 50: CPT | Performed by: HOSPITALIST

## 2024-03-15 PROCEDURE — 99231 SBSQ HOSP IP/OBS SF/LOW 25: CPT | Performed by: SURGERY

## 2024-03-15 PROCEDURE — 63600000 HC DRUGS/DETAIL CODE: Mod: JZ

## 2024-03-15 PROCEDURE — 99232 SBSQ HOSP IP/OBS MODERATE 35: CPT | Performed by: INTERNAL MEDICINE

## 2024-03-15 PROCEDURE — 80053 COMPREHEN METABOLIC PANEL: CPT

## 2024-03-15 PROCEDURE — 36415 COLL VENOUS BLD VENIPUNCTURE: CPT

## 2024-03-15 PROCEDURE — 25000000 HC PHARMACY GENERAL: Performed by: STUDENT IN AN ORGANIZED HEALTH CARE EDUCATION/TRAINING PROGRAM

## 2024-03-15 RX ADMIN — AMLODIPINE BESYLATE 10 MG: 10 TABLET ORAL at 09:38

## 2024-03-15 RX ADMIN — PANTOPRAZOLE SODIUM 40 MG: 40 INJECTION, POWDER, FOR SOLUTION INTRAVENOUS at 09:39

## 2024-03-15 RX ADMIN — MOMETASONE FUROATE AND FORMOTEROL FUMARATE DIHYDRATE 2 PUFF: 100; 5 AEROSOL RESPIRATORY (INHALATION) at 20:43

## 2024-03-15 RX ADMIN — MOMETASONE FUROATE AND FORMOTEROL FUMARATE DIHYDRATE 2 PUFF: 100; 5 AEROSOL RESPIRATORY (INHALATION) at 09:36

## 2024-03-15 RX ADMIN — MIRTAZAPINE 7.5 MG: 15 TABLET, FILM COATED ORAL at 20:43

## 2024-03-15 RX ADMIN — SILDENAFIL 20 MG: 20 TABLET, FILM COATED ORAL at 09:37

## 2024-03-15 RX ADMIN — Medication 1000 UNITS: at 09:37

## 2024-03-15 RX ADMIN — SILDENAFIL 20 MG: 20 TABLET, FILM COATED ORAL at 20:43

## 2024-03-15 RX ADMIN — ONDANSETRON 4 MG: 2 INJECTION INTRAMUSCULAR; INTRAVENOUS at 00:59

## 2024-03-15 RX ADMIN — MUPIROCIN 1 APPLICATION: 20 OINTMENT TOPICAL at 09:40

## 2024-03-15 RX ADMIN — ACETAMINOPHEN 975 MG: 325 TABLET ORAL at 20:45

## 2024-03-15 RX ADMIN — PREDNISONE 20 MG: 20 TABLET ORAL at 09:38

## 2024-03-15 RX ADMIN — Medication 100 MCG: at 09:38

## 2024-03-15 RX ADMIN — SILDENAFIL 20 MG: 20 TABLET, FILM COATED ORAL at 13:40

## 2024-03-15 RX ADMIN — ATORVASTATIN CALCIUM 20 MG: 20 TABLET, FILM COATED ORAL at 17:14

## 2024-03-15 RX ADMIN — LEVOTHYROXINE SODIUM 100 MCG: 0.1 TABLET ORAL at 05:07

## 2024-03-15 RX ADMIN — TIOTROPIUM BROMIDE INHALATION SPRAY 2 PUFF: 3.12 SPRAY, METERED RESPIRATORY (INHALATION) at 09:36

## 2024-03-15 RX ADMIN — COLCHICINE 0.3 MG: 0.6 TABLET ORAL at 09:38

## 2024-03-15 ASSESSMENT — COGNITIVE AND FUNCTIONAL STATUS - GENERAL
CLIMB 3 TO 5 STEPS WITH RAILING: 1 - TOTAL
STANDING UP FROM CHAIR USING ARMS: 2 - A LOT
WALKING IN HOSPITAL ROOM: 1 - TOTAL
MOVING TO AND FROM BED TO CHAIR: 2 - A LOT

## 2024-03-15 NOTE — PROGRESS NOTES
Internal Medicine  Daily Progress Note       SUBJECTIVE   This is a 63 y.o. year-old female admitted on 3/3/2024 with Pericardial effusion [I31.39]  Interstitial lung disease (CMS/HCC) [J84.9]  Hypoxia [R09.02].    Interval History:   Patient's SBF was negative for any obstruction yesterday. She had around 4-5 episodes of diarrhea. She also reports an episode of vomiting.      OBJECTIVE   Vital signs in last 24 hours:  Temp:  [36.3 °C (97.4 °F)-37.1 °C (98.7 °F)] 36.7 °C (98.1 °F)  Heart Rate:  [58-93] 67  Resp:  [17-19] 18  BP: (116-158)/(57-75) 138/65  SpO2:  [95 %-100 %] 99 %  Oxygen Therapy: None (Room air)    Weight & I/Os:  Weights (last 5 days)     Date/Time Weight    03/15/24 0315 44.8 kg (98 lb 12.3 oz)    03/14/24 0630 43 kg (94 lb 12.8 oz)    03/13/24 0555 43.5 kg (96 lb)    03/12/24 0611 46.4 kg (102 lb 4.8 oz)    03/11/24 0539 46.5 kg (102 lb 8.2 oz)    03/10/24 0555 44.8 kg (98 lb 12.8 oz)        No intake or output data in the 24 hours ending 03/15/24 0659     PHYSICAL EXAMINATION   Physical Exam  Cardiovascular:      Rate and Rhythm: Normal rate and regular rhythm.      Pulses: Normal pulses.      Heart sounds: Normal heart sounds.   Pulmonary:      Effort: Pulmonary effort is normal.      Breath sounds: Normal breath sounds.   Abdominal:      Comments: Diffusely tender abdomen    Musculoskeletal:      Right lower leg: No edema.      Left lower leg: No edema.   Neurological:      Mental Status: She is alert. Mental status is at baseline.          LINES, CATHETERS, DRAINS, AIRWAYS, & WOUNDS   Lines, drains, airways, & wounds:  Peripheral IV (Adult) 03/11/24 Left;Posterior Forearm (Active)   Number of days: 4       Peripheral IV (Adult) 03/12/24 Anterior;Proximal;Right Forearm (Active)   Number of days: 3       Wound Other (comment) Anterior;Left Toe (Great) (Active)   Number of days: 11       Wound Pressure Injury Anterior;Left Ankle (Active)   Number of days: 11       Wound Pressure Injury  Anterior;Right Ankle (Active)   Number of days: 11       Wound Other (comment) Anterior;Right Toe (2nd) (Active)   Number of days: 11        LABS, IMAGING, & TELE   Labs:  I have reviewed the patient's pertinent labs. Pertinent labs are within normal limits.    Results from last 7 days   Lab Units 03/15/24  0514 03/14/24  0410 03/13/24  0846   WBC K/uL 14.20* 12.71* 9.30   HEMOGLOBIN g/dL 9.5* 9.3* 9.1*   HEMATOCRIT % 30.8* 30.7* 29.6*   PLATELETS K/uL 256 287 290       Results from last 7 days   Lab Units 03/15/24  0514 03/14/24  0410 03/13/24  0846   SODIUM mEQ/L 137 138 138   POTASSIUM mEQ/L 4.1 4.5 4.6   CHLORIDE mEQ/L 104 106 107   CO2 mEQ/L 25 24 22   BUN mg/dL 29* 23 24   CREATININE mg/dL 1.0 0.9 0.9   CALCIUM mg/dL 8.6 9.0 9.0   ALBUMIN g/dL 2.9* 3.1* 3.0*   BILIRUBIN TOTAL mg/dL 0.3 0.3 0.3   ALK PHOS IU/L 44 49 49   ALT IU/L 12 14 15   AST IU/L 10* 12* 12*   GLUCOSE mg/dL 73 81 95     Imaging personally reviewed (does not include unread studies):  FLUOROSCOPY SMALL BOWEL STUDY    Result Date: 3/14/2024  CLINICAL HISTORY: assess for SBO, needs to follow steroid pre-treatment protocol, will drink gastrograffin COMMENT: Therapeutic small bowel study performed and compared with 3/13/2024. The patient drank water-soluble Omnipaque contrast material. Contrast material is seen within colon at 6 hours, excluding high-grade small bowel obstruction.     IMPRESSION: Please see comment.     ASSESSMENT & PLAN   Nausea and vomiting  Assessment & Plan  Patient developed nausea and dark vomiting on 3/11 afternoon   CT abdomen showed dilated loops of bowel that could represent SBO vs ileus   Surgery team consulted and recommended CT abdomen with IV contrast  Ct abdomen possible obstruction    Patient NPO and given IV fluids   SBF negative   Will consider feeding       Interstitial lung disease (CMS/HCC)  Assessment & Plan  Patient has a PMH of ILD, CTEPH and phtn on sildenafil, mecetentan, and scleroderma   Has been  discussing Tocilizumab with rheumatology but hasn't started it yet   Echo: EF 60%, No regional wall abnormalities, Grade II diastolic dysfunction  Continue pain meds as able     Impression: confirmed ILD and precap phtn 2/2 scleroderma, pw worsening SHOB, cp, and new pericardial effusion     - continue home meds   - cont PTA amlodipine   - continue phtn meds as able   - macitanetan and sildenafil       Pulmonary hypertension (CMS/HCC)  Assessment & Plan  Patient has a pMH of pulmonary htn, scleroderma, CTEPh and precapillary pHTN   Sees Dr. Arvizu   Multiple echos and RHC and LHC   Endorses continued worsening SHOB since 10/2022 with BOWSER, palpitations, bendopnea   3/4: Echo: EF 60%, No regional wall abnormalities, Grade II diastolic dysfunction  Most recent echo 6/2023 showed EF 60%, mild TR, RVSP 55, increased RV systolic pressures compared to April   Had a PE in march 2023, was on elequis however had hemoptysis and it was discontinued   VQ in sept 2023 showed mod-high prob of PE disease and CTA PE showed no acute filling defect but enlargmeent of PA cw PHTN    recently started mecitentan aug/sept 2023 wo much subjective change     Ddx: multifactorial phtn scleroderma ILD vs. CTEPH vs. Pericardial effusion vs. Combo thereof     Impression: 62yo lady with PMH of raynauds, phtn, cutaneous scleroderma, ILD, CTEPh pw worsening SHOB x 24 hours with new pericardial effusion seen on CT. Prior echos have shown increasing RVSP and worsening phtn, for which she is on sildenafil, mecitentan.     - continue sildenafil   - continue mecitentan   - cards on board   - closely monitor BP in ICU   - SCDs for DVT given hemoptysis            * Pericardial effusion  Assessment & Plan  Ddx: autoimmune vs. Myocardial injury vs. Bacterial infection vs. Viral vs. Malignant   Patient has a PMH of phtn, scleroderma, CTEPH   pw increasing chest pressure x 1 day, without radiation into arms  Endorses continued GERD sxs however no new NVD  S/p  Methylprednisolone x1 in ED   CT shows pericardial effusion   Echo: EF 60%, No regional wall abnormalities, Grade II diastolic dysfunction  ; Trop 12.9   OIL355 and CRP 8.9   MRSA Nares: Negative  TB panel negative 2/27   BCx: NGTD      - started colchicine but dropping dose due to diarrhea to colchicine 0.3.   - on low dose prednisone 20mg daily (as indomethacin alternative, pt reports allerges to ASA and Ibuprofen)   - coronary CTA showed moderate 2 vessel CAD. Recommend plavix and statin but patient refusing medications.   - TB panel 2/27 negative   - CBC daily  - CMP daily   - scds for dvt ppx given hemooptysis   - Allergist recommendations appreciated  - VS frequently       Hypothyroid  Assessment & Plan  Patient has a PMH of hypothyroidism 2/2 thyroidectomy for follicular carcinoma of the thyroid     Note H/o Total Thyroidectomy (Dr. Pacheco at [facility]; 4/2013)  - continue home thyroid medications Synthroid 100mcg qd    Acute on chronic heart failure with preserved ejection fraction (CMS/HCC)  Assessment & Plan  Patient has a PMH of HFPEF with EF 65%   Endorsing worsening HSOB, denies any ANGEL LUIS, UFD or medication complaince difficulty   CT shows pericardial effusion without pleural effusions or pulmonary edema   Exam negative for ANGEL LUIS, crackles     Impression: 62 yo lady with PMH of scleroderma and PE, phtn on mecitentan and sildenafil pw worseing BOWSER and shob with new pericardial effusion on ct imaging, negative for pulmonary edema or other s/sxs of CHF exacerbation. Unlikely this is a CHF exacerbation.     - continue to monitor   - continue home medications     History of pulmonary embolism  Assessment & Plan  Patient has a PMH of phtn and PE tx w/ apixiban however cb epistaxis and hemoptysis   Stopped elequis in June 2023   pw worsening shob and new pericardial effusion   Continues to endorse hemoptysis and epistaxis worsening over last few days   Echo EF 60%, no regional wall motion abnormalities, grade II  diastolic dysfunction    - scds for now for dvt ppx       Open wound of right foot  Assessment & Plan  Patient has multiple wounds noted on her feet bilaterally   Wound images from admission in the media tab   Is on CCB at home   States wounds have difficulty healing  Also now with poor nutritional status iso scleroderma and ILD     Ddx: nonhealing ulcers 2/2 autoimmune disease vs. 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 Has chronic nonhealing ulcers to her feet, most likely secondary to her scleroderma and poor nutritional status     - cont CCB  - wound ostomy on board   - consider pain management   - nutrition consult        VTE Assessment: Padua    VTE Prophylaxis: Current anticoagulants:    •None      Code Status: Full Code  Estimated discharge date: 3/15/2024         ATTENDING DOCUMENTATION  ALSO SEE ATTENDING ATTESTATION SECTION OF NOTE

## 2024-03-15 NOTE — PROGRESS NOTES
Patient: Arielle GUAJARDO Isidro  Location: [place] [address]  MRN:  [id number]  Today's date:  3/15/2024    Attempted to see patient for therapy. Unable due to patient refused (Pt refusing all bed mobility or EOB activity due to just having a BM on the bed pan.  Pt reported feeling week and unable to participate at this.  [employer] education and benefits on participating, she cont to refuse.).

## 2024-03-15 NOTE — TELEPHONE ENCOUNTER
Patient had an echo, 6min walk, and ov all scheduled for June. Should all 3 be r/s to this earlier time?

## 2024-03-15 NOTE — TELEPHONE ENCOUNTER
Hi -     Can you please schedule TTE in 1 month (ordered) and hospital follow-up appointment after TTE? Discharge planned for the next 24-72 hours.     ThanksTimothy

## 2024-03-15 NOTE — PROGRESS NOTES
"Brief Nutrition Note    Recommendations     -Low fiber/residue, SHAHEEN -and encourage PO intake >75% of each meal, as tolerated.   -Trial Boost Plus BID for additional nourishment.     Clinical Course: Patient is a 63 y.o. female who was admitted on 3/3/2024 with a diagnosis of Pericardial effusion [I31.39]  Interstitial lung disease (CMS/HCC) [J84.9]  Hypoxia [R09.02].     Past Medical History:   Diagnosis Date   • De Quervain's tenosynovitis    • Essential (primary) hypertension    • Follicular carcinoma of thyroid (CMS/HCC)    • Gastro-esophageal reflux    • Hand ulceration (CMS/HCC)    • Hyperlipidemia, unspecified    • Interstitial lung disease (CMS/HCC)    • Leg ulcer (CMS/HCC)    • Lung nodule    • Lupus (CMS/HCC)    • Osteomyelitis of hand (CMS/HCC)    • Osteoporosis    • Pulmonary hypertension (CMS/HCC)    • Raynaud's disease     Severe   • Reflux esophagitis    • Scleroderma (CMS/HCC)    • Screening mammogram for breast cancer 05/04/2021    BI-RADS category: 1 - Negative (care everywhere @ [place])     Past Surgical History:   Procedure Laterality Date   • CARDIAC CATHETERIZATION     • COLONOSCOPY     • HERNIA REPAIR         Reason for Assessment  Reason For Assessment: per organizational policy    MST Nutrition Screen Tool  Has patient lost weight without trying?: 2-->Yes, 14-23 lbs  Has patient been eating poorly due to decreased appetite?: 1-->Yes  MST Nutrition Screen Score: 3     Nutrition/Diet History  Intake (%): 75%    RETS18 Physical Appearance  Overall Physical Appearance: loss of muscle mass, loss of subcutaneous fat, generalized wasting  Last Bowel Movement: 03/15/24  Skin: pressure injury (L toe-U, L ankle stage 2, R ankle stage 2)     Nutrition Order  Nutrition Order: meets nutritional requirements  Nutrition Order Comments: low fiber/residue; cardiac     Anthropometrics  Height: 167.6 cm (5' 6\")     Current Weight  Weight Method: Bed scale  Weight: 44.8 kg (98 lb 12.3 oz)     Ideal Body Weight " (IBW)  Ideal Body Weight (IBW) (kg): 59.58  % Ideal Body Weight: 79.94     Body Mass Index (BMI)  BMI (Calculated): 15.9     Labs/Procedures/Meds  Lab Results Reviewed: reviewed, pertinent   Lab Results   Component Value Date    GLUCOSE 73 03/15/2024    CALCIUM 8.6 03/15/2024     03/15/2024    K 4.1 03/15/2024    CO2 25 03/15/2024     03/15/2024    BUN 29 (H) 03/15/2024    CREATININE 1.0 03/15/2024       Medications  Pertinent Medications Reviewed: reviewed, pertinent   • amLODIPine  10 mg oral q AM   • atorvastatin  20 mg oral Daily (6p)   • [Provider Managed Hold] bisacodyL  10 mg rectal Daily   • cholecalciferol (vitamin D3)  1,000 Units oral Daily   • clopidogreL  75 mg oral Daily   • colchicine  0.3 mg oral Daily   • cyanocobalamin  100 mcg oral Daily   • levothyroxine  100 mcg oral Daily   • lidocaine  1 patch Topical Daily   • mirtazapine  7.5 mg oral Nightly   • mometasone-formoterol  2 puff inhalation BID (8a, 8p)    And   • tiotropium bromide  2 puff inhalation Daily (8a)   • mupirocin  1 Application Topical Daily   • pantoprazole  40 mg intravenous q24h   • predniSONE  20 mg oral Daily   • macitentan  10 mg oral Daily   • [Provider Managed Hold] senna  2 tablet oral Nightly   • sildenafiL (pulm.hypertension)  20 mg oral TID       Estimated/Assessed Needs  Additional Documentation: Calorie Requirements (Group), Protein Requirements (Group)     Skin: multiple impairments    Clinical comments:  Pt seen for nutrition follow up. Advanced diet and then pt had experiences nausea, vomiting, diarrhea. SBFT conducted which excluded obstruction. Was planned to return NPO/CLD. Now on low fiber/residue. ONS ordered for additional nourishment.   Note that pt on CLD/NPO for several days during admission. Pt does show wt loss per wts taken via bedscale-will monitor trends as bedscale weights may add constraints regarding consistency/accuracy.   Will continue to monitor and reassess per protocol unless  otherwise consulted.     Goals:PO intake >75% of nutrient needs.  Monitor:Diet order, appetite, PO intake, labs, I/Os, skin integrity, wt status, GI function.     Recommendations: See above       Date: 03/15/24  Signature: BECCA Matthews

## 2024-03-15 NOTE — PROGRESS NOTES
Heart Failure / Pulmonary Hypertension Progress Note    Interval History: Seen and examined sitting up in bed eating breakfast. She reports vomiting and diarrhea last night. She continues to deny chest pain/pressure, shortness of breath or light-headedness.     Consult Background:   Ms. Felix is a 63 y.o. female with a past medical history of Pulmonary HTN, HTN, Raynaud's Disease, Cutaneous Systemic Scleroderma (dx 1998), ILD, PE (3/2023). She is a patient of Dr. Nguyễn. She was previously followed by Dr. Jos Valdez at [facility]. Echocardiogram from 4/21/2022 showed LVEF: 55-60%, mild aortic valve thickening, pulmonary artery systolic pressure: 52 mmHg and trivial pericardial effusion. LHC and RHC (separate occasions) over the last 5-10 years which showed normal hemodynamics and normal coronaries. She had a RHC 9/02/2020 showing top-normal right sided filling pressures with mild pulmonary hypertension, top-normal PVR and normal pulmonary capillary wedge pressure. The cardiac index was normal, seen below.     On 6/27/2022, she was in [facility] ER for chest pain. EKG: NSR without ischemic changes. hsTrop: 12->16, d-dimer: 0.56, CXR showed cardiomegaly and diffuse interstitial prominence.     Dr. Nguyễn ordered echocardiogram, which was completed on 8/22/2022 and showed estimated RVSP = 50-55 mmHg, normal-sized LV with normal LV systolic function. Estimated EF 55- 60%. Wall motion grossly normal, mild concentric left ventricular hypertrophy, grade I LV diastolic dysfunction, probably normal RV size and function.    At her initial visit on 10/11/2022, she reported that she felt very short of breath with minimal activity most of the time. She reports that this started about 1 year prior and had progressively gotten worse. She described PND and bendopnea. She reported that she experienced ankle swelling, worsened over the past year and usually worse towards the end of the night. She also reported some swelling in her  abdomen. She reported daily palpations. I recommended a RHC which was done 11/28/2022 and showed: Normal right sided filling pressures. Mildly elevated left sided filling pressures. Precapillary pulmonary hypertension with mean PA 36 mmHg.    She was hospitalized on 3/19/2023 at [facility] for PE diagnosed on V/Q scan. She reports that she had presented with left arm pain and heaviness. She states that she was started on Apixaban. One week after her last office visit (4/6/2023), she presented to [facility] with chest pain, epistaxis and hemoptysis. Echocardiogram showed: left ventricular cavity size normal with mild left ventricular hypertrophy and preserved systolic function. Estimated EF 65%. Chest CTA showed no evidence of PE; Apixaban was discontinued.     She had a repeat echocardiogram (6/2023) which showed: Normal left ventricular cavity size and mild concentric left ventricular hypertrophy. Normal systolic function. EF: 60%. Normal right ventricular structure and function. Mild tricuspid valve regurgitation with estimated RVSP: 55 mmHg. Compared to previous study from 4/14/2023, estimated right ventricular systolic pressure were higher.    At her last office visit on 7/25/2023, she reported that she was not sure if she received/started the Macitentan. She stated at the time we initiated it, her Pulmonologist also started her on a new medication (Mycophenolate), and she had significant GI intolerance with this. It was trialed at various doses but ultimately stopped. At that visit, I asked her to check if she had Macitentan and if she did to start it and monitor her SpO2.    She underwent V/Q scan in September 2023, which showed intermediate-to-high probability of pulmonary embolic disease, as a result she completed CTA Chest PE which showed: no evidence for filling defect or vessel cutoff to suggest pulmonary embolism. Enlargement of the pulmonary arteries compatible with pulmonary arterial  "hypertension was seen.    She started Macitentan around August or September 2023.    She was last seen in the office in February 2024.     She presented to [facility] on 3/3 with chest pressure, palpitations, nausea. She was noted to have hypoxia and tachypnea at presentation. Given moderate to large pericardial effusion, she was monitored in CCU and transferred out on 3/6. hsTroponin was not elevated. She remained hemodynamically stable while in CCU. She has been started on Colchicine and steroids without relief of multi-system pain. She is planned for CCTA this AM. Hypoxia and tachypnea have resolved.     ---    Rheumatology: Enzo ([facility])     Past Medical / Surgical History:  Pulmonary HTN, HTN, Raynaud's Disease, Cutaneous Systemic Scleroderma (dx 1998), ILD, PE (3/2023), Follicular Carcinoma of Thyroid, Tenosynovitis, de Quervain, h/o Hernia Repair, h/o Appendicitis, h/o Digit Amputation, H/o Total Thyroidectomy (Dr. Pacheco at [facility]; 4/2013)    Family & Social History: She is , she has two children. She works as an .     Tobacco: former 2005  EtOH: never   Illicits: never     Her brother has CAD with stent  1st cousin with SLE  Both parents had \"heart problems\"    Allergies:   Allergies   Allergen Reactions   • Aspirin Shortness of breath     Other reaction(s): Unknown  \"stop breathing\"     • Ibuprofen Shortness of breath     Stop breathing   • Iodine Shortness of breath     Other reaction(s): Unknown   • Iodine And Iodide Containing Products Shortness of breath     ? rash   • Penicillins Shortness of breath     Other reaction(s): Unknown   • Sulfa (Sulfonamide Antibiotics) Angioedema   • Clindamycin    • Hydrochlorothiazide      Other reaction(s): Abdominal Pain   Slow heart rate   • Oxycodone      Other reaction(s): Vomiting   • Sulfamethoxazole-Trimethoprim      Other reaction(s): Vomiting, Weakness    • Latex Rash       Medications:   Current Facility-Administered Medications   Medication Dose Route " Frequency Provider Last Rate Last Admin   • acetaminophen (TYLENOL) tablet 975 mg  975 mg oral q6h PRN Brady Kim MD   975 mg at 03/14/24 2009   • amLODIPine (NORVASC) tablet 10 mg  10 mg oral q AM Patricia Haley DO   10 mg at 03/14/24 1440   • [Provider Managed Hold] amLODIPine (NORVASC) tablet 5-10 mg  5-10 mg oral Nightly PRN Dee Arellano DO       • atorvastatin (LIPITOR) tablet 20 mg  20 mg oral Daily (6p) Wolf Bernard MD   20 mg at 03/14/24 2005   • [Provider Managed Hold] bisacodyL (DULCOLAX) 10 mg suppository 10 mg  10 mg rectal Daily Brady Kim MD   10 mg at 03/12/24 0421   • cholecalciferol (vitamin D3) tablet 1,000 Units  1,000 Units oral Daily Dee Arellano DO   1,000 Units at 03/14/24 1441   • clopidogreL (PLAVIX) tablet 75 mg  75 mg oral Daily Wolf Bernard MD       • colchicine (COLCRYS) tablet 0.3 mg  0.3 mg oral Daily Octavio Giordano MD   0.3 mg at 03/14/24 1441   • cyanocobalamin (VITAMIN B12) tablet 100 mcg  100 mcg oral Daily Dee Arellano DO   100 mcg at 03/14/24 1441   • glucose chewable tablet 16-32 g of dextrose  16-32 g of dextrose oral PRN Dee Arellano DO        Or   • dextrose 40 % oral gel 15-30 g of dextrose  15-30 g of dextrose oral PRN Dee Arellano DO        Or   • glucagon (GLUCAGEN) injection 1 mg  1 mg intramuscular PRN Dee Arellano DO        Or   • dextrose 50 % in water (D50) injection 12.5 g  25 mL intravenous PRN Dee Arellano DO       • lactated ringer's infusion   intravenous Continuous Octavio Giordano MD 40 mL/hr at 03/14/24 1747 New Bag at 03/14/24 1747   • levothyroxine (SYNTHROID) tablet 100 mcg  100 mcg oral Daily Dee Arellano DO   100 mcg at 03/15/24 0507   • lidocaine (ASPERCREME) 4 % topical patch 1 patch  1 patch Topical Daily Sherita Panchal MD   1 patch at 03/10/24 0953   • [Provider  Managed Hold] loperamide (IMODIUM) capsule 2 mg  2 mg oral 3x daily PRN Wolf Bernard MD   2 mg at 03/08/24 2051   • meclizine (ANTIVERT) chewable tablet 25 mg  25 mg oral 3x daily PRN Wagner Chen MD   25 mg at 03/05/24 1817   • metoprolol (LOPRESSOR) injection 5 mg  5 mg intravenous PRN Timothy Arvizu DO   5 mg at 03/07/24 1215   • metoprolol tartrate (LOPRESSOR) tablet 50 mg  50 mg oral q30 min PRN Timothy Arvizu DO   50 mg at 03/07/24 1000   • mirtazapine (REMERON) tablet 7.5 mg  7.5 mg oral Nightly Wolf Bernard MD   7.5 mg at 03/14/24 2005   • mometasone-formoterol (DULERA 100) 100-5 mcg/actuation inhaler 2 puff  2 puff inhalation BID (8a, 8p) Dee Arellnao DO   2 puff at 03/14/24 2006    And   • tiotropium bromide (SPIRIVA RESPIMAT) 2.5 mcg/actuation inhaler 2 puff  2 puff inhalation Daily (8a) Dee Arellano DO   2 puff at 03/14/24 1443   • mupirocin (BACTROBAN) 2 % ointment 1 Application  1 Application Topical Daily Dee Arellano DO   1 Application at 03/14/24 1442   • ondansetron ODT (ZOFRAN-ODT) disintegrating tablet 4 mg  4 mg oral q8h PRN Dee Arellano DO   4 mg at 03/11/24 1621    Or   • ondansetron (ZOFRAN) injection 4 mg  4 mg intravenous q8h PRN Dee Arellano DO   4 mg at 03/15/24 0059   • pantoprazole (PROTONIX) injection 40 mg  40 mg intravenous q24h Wolf Bernard MD   40 mg at 03/14/24 1441   • predniSONE (DELTASONE) tablet 20 mg  20 mg oral Daily Wolf Bernard MD   20 mg at 03/14/24 0842   • Pt Own- Macitentan (OPSUMIT) tablet 10mg  10 mg oral Daily Dee Arellano DO   10 mg at 03/14/24 1440   • [Provider Managed Hold] senna (SENOKOT) tablet 2 tablet  2 tablet oral Nightly Bardy Kim MD   2 tablet at 03/12/24 0420   • sildenafiL (pulm.hypertension) (REVATIO) tablet 20 mg  20 mg oral TID Octavio Giordano MD   20 mg at 03/14/24 2005       Review of Systems:   All  other systems reviewed and are negative except as per HPI.    Physical Exam:     Wt Readings from Last 10 Encounters:   03/15/24 44.8 kg (98 lb 12.3 oz)   02/01/24 47.6 kg (105 lb)   07/25/23 50.8 kg (112 lb)   07/25/23 50.8 kg (112 lb)   06/21/23 51.3 kg (113 lb)   04/19/23 51.7 kg (113 lb 14.4 oz)   04/06/23 52.6 kg (116 lb)   01/18/23 54.4 kg (120 lb)   11/28/22 55.3 kg (122 lb)   10/07/22 56.2 kg (124 lb)       BP Readings from Last 5 Encounters:   03/15/24 118/63   02/01/24 120/70   07/25/23 130/80   07/25/23 134/80   06/21/23 132/85       Vitals:    03/15/24 0315   BP: 118/63   Pulse: 61   Resp: 17   Temp: 37.1 °C (98.7 °F)   SpO2:        Body mass index is 15.94 kg/m².  Body surface area is 1.44 meters squared.    Constitutional: NAD, AAOx3  HENT: Normocephalic, atraumatic head, sclerae anicteric, no cervical lymphadenopathy, trachea midline  Cardiovascular: RRR, no murmurs, rubs or gallops, JVP: 7 cm H2O   cm H2O, no carotid bruits.  Respiratory: CTA bilateral lung fields, no wheezes, rales or rhonchi  GI: soft, non-tender/non-distended  Musculoskeletal / Extremities: no peripheral edema, +2 pulses bilateral radial, DP/PT arteries, skin tightening on face and fingertips  Skin: no rashes  Neuro / Psych: no focal deficits, CNII-XII grossly intact, appropriate, cooperative    Diagnostic Data:     Results from last 7 days   Lab Units 03/15/24  0514 03/14/24  0410 03/13/24  0846   SODIUM mEQ/L 137 138 138   POTASSIUM mEQ/L 4.1 4.5 4.6   CHLORIDE mEQ/L 104 106 107   CO2 mEQ/L 25 24 22   BUN mg/dL 29* 23 24   CREATININE mg/dL 1.0 0.9 0.9   CALCIUM mg/dL 8.6 9.0 9.0   ALBUMIN g/dL 2.9* 3.1* 3.0*   BILIRUBIN TOTAL mg/dL 0.3 0.3 0.3   ALK PHOS IU/L 44 49 49   ALT IU/L 12 14 15   AST IU/L 10* 12* 12*   GLUCOSE mg/dL 73 81 95     Results from last 7 days   Lab Units 03/15/24  0514 03/14/24  0410 03/13/24  0846   WBC K/uL 14.20* 12.71* 9.30   HEMOGLOBIN g/dL 9.5* 9.3* 9.1*   HEMATOCRIT % 30.8* 30.7* 29.6*   MCV fL 87.5  87.7 86.0   PLATELETS K/uL 256 287 290     Component      Latest Ref [facility] 4/13/2023 3/3/2024 3/12/2024   Lactate      0.4 - 2.0 mmol/L 1.0  1.1  0.9      Component      Latest [facility] 4/13/2023 2/27/2024 3/3/2024   BNP      <=100 pg/mL 105 (H)  82  119 (H)       Component      Latest [facility] 3/3/2024   High Sens Troponin I      <15.0 pg/mL 12.6    High Sens Troponin I       12.2      Component      Latest [facility] 3/4/2024   CRP      <7.00 mg/L 8.90 (H)       Component      Latest [facility] 3/4/2024   Sed Rate      0 - 30 mm/hr 119 (H)       Component      Latest [facility] 3/3/2024   D-Dimer, Quant      0.00 - 0.50 ug/mL FEU 0.68 (H)       Component      Latest [facility] 4/18/2023 9/27/2023   Sodium      134 - 144 mmol/L 139  140    Potassium, Bld      3.5 - 5.2 mmol/L 4.4  4.1    Chloride      96 - 106 mmol/L 108  107 (H)    CO2      20 - 29 mmol/L 23  20    BUN      8 - 27 mg/dL 26 (H)  18    Creatinine      0.57 - 1.00 mg/dL 1.1  0.96    Glucose      70 - 99 mg/dL 82  83    Calcium      8.7 - 10.3 mg/dL 9.1  9.5    eGFR      >59 mL/min/1.73 >60.0  67      Component      Latest [facility] 4/13/2023   Hemoglobin A1C      <5.7 % 4.4      Component      Latest [facility] 4/13/2023 4/18/2023   WBC      3.80 - 10.50 K/uL 7.12  6.95    Hemoglobin      11.8 - 15.7 g/dL 10.6 (L)  10.1 (L)    Hematocrit      35.0 - 45.0 % 34.3 (L)  33.0 (L)    MCV      83.0 - 98.0 fL 87.7  90.4    Platelets      150 - 369 K/uL 267  256        Component      Latest [facility] 4/14/2023   Triglycerides      30 - 149 mg/dL 44    Cholesterol      <=200 mg/dL 194    HDL      >=55 mg/dL 49 (L)    LDL Calculated      <=100 mg/dL 136 (H)    Non-HDL, Calculated      mg/dL 145    RISK      <=5.0  4.0       Component      Latest Ref Rn 4/13/2023   High Sens Troponin I      <15.0 pg/mL 23.0 (H)       Component      Latest Ref Rng 4/14/2023   Ferritin      11 - 250 ng/mL 75      Component      Latest Ref Rn 4/13/2023   TSH      0.34 - 5.60 mIU/L 4.71       Component      Latest Ref Rng 4/14/2023   Iron      35 - 150 ug/dL 29 (L)    TIBC      270 - 460 ug/dL 245 (L)    UIBC      180 - 360 ug/dL 216    Iron Saturation      15 - 45 % 12 (L)        Component      Latest Ref Rng 6/27/2022 4/13/2023   BNP      <=100 pg/mL 111 (H)  105 (H)                                     ---    ECG (3/3/2024, personally reviewed):   Sinus tachycardia   Nonspecific ST and T wave abnormality   HR: 129 bpm     ---    CTA Abd / Pelvis (3/13/2024, personally reviewed):   There is dilatation of the of proximal/mid small bowel with relatively abrupt  transition left hemiabdomen, likely obstruction. Questionable lesion at the  transition point. This could be better evaluated with CT or MRI enterography.     Aorta and major branches patent. Superior mesenteric artery patent without  significant stenosis.  Suspect moderate stenosis of the origins of the of renal arteries bilaterally.     Large pericardial effusion, no change.     Patchy opacities lower lungs, similar to prior.     TTE (3/4/2024, personally reviewed):   • Normal-sized left ventricle. Mild left ventricular hypertrophy. Preserved systolic function. LVEF 60%. No regional wall motion abnormalities. Grade II diastolic dysfunction.  Normal global longitudinal strain (-20%).  • The aortic valve is not well seen.  Cannot determine the number of cusps.  Sclerotic leaflets.  No aortic stenosis.  Trace aortic insufficiency.  • Normal sized aortic root.  The visualized portion of the ascending aorta is normal in size.  • The mitral valve leaflets are thickened. Mild mitral annular calcification. Trace mitral regurgitation.   • Mildly dilated left atrium.  • Normal-sized right ventricle. Normal right ventricular systolic function.  • Thickened tricuspid valve. There is mild tricuspid regurgitation with estimated RVSP of 46 mmHg.   • Pulmonic valve is not well visualized. Trace pulmonic regurgitation.   • Mildly dilated right atrium.  • IVC  is enlarged with <50% respiratory variation consistent with elevated right atrial filling pressure (at least 15 mmHg).   • Moderate, circumferential, circumferential pericardial effusion.  The largest pocket is located inferolaterally.  Elevated right atrial pressure.  No other clear echocardiographic features of increased pericardial pressure.  Correlate clinically.   • Compared to previous TTE from 6/21/2023, pericardial effusion now moderate in size.  Right atrial pressure now elevated.       CTA Chest (3/3/2024):   IMPRESSION:  1.  No evidence of acute pulmonary embolism to the level of the segmental  branches.  2.  Moderate to large pericardial effusion measuring higher than simple fluid  density.  3.  Lower lobe lung field predominant interstitial thickening with some relative  subpleural sparing, consistent with a chronic interstitial lung disease,  unchanged from the prior study.  4.  Continued bilateral axillary, mediastinal, and bilateral hilar  lymphadenopathy, not definitely changed from the prior study, possibly related  to the patient's sarcoid.  5.  Emphysema.     CTA Chest PE (10/4/2023):  IMPRESSION:  1. No evidence for filling defect or vessel cutoff to suggest pulmonary  embolism.  Enlargement of the pulmonary arteries compatible with pulmonary  arterial hypertension.  2. Changes throughout the lungs as described above which can be seen with  systemic sclerosis/scleroderma.  Underlying pneumonia the lung bases cannot be  entirely excluded in the proper clinical setting.  3.  Additional stable findings as described above.        VQ (9/12/2023):  IMPRESSION:  Intermediate to high probability of pulmonary embolic disease.     6MWT (7/25/2023, on Sildenafil 20 mg TID):        TTE (6/21/2023, personally reviewed):  1. Normal left ventricular cavity size and mild concentric left ventricular hypertrophy. Normal systolic function. EF: 60%. No regional wall motion abnormalities. Grade I diastolic  dysfunction.   2. Aortic valve cusps not well visualized. Trace aortic regurgitation. Aortic valve and root sclerosis.  3. Thickened mitral valve leaflets. Mild mitral annular calcification. Trace mitral regurgitation. Mildly dilated left atrial size.   4. Normal right ventricular structure and function. Mild tricuspid valve regurgitation with estimated RVSP: 55 mmHg (assuming right atrial pressure of 3 mmHg). Normal right atrial size. Pulmonic valve not well visualized. Trace pulmonic valve regurgitation. Pulsed wave Doppler of the RVOT systolic profile show decreased acceleration times ( msec) and late systolic notching consistent with elevated PVR.   5. IVC size is normal with > 50% inspiratory collapse consistent with normal right atrial pressure. Small pericardial effusion without echocardiographic evidence of tamponade.  6. Compared to previous study from 4/14/2023, estimated right ventricular systolic pressure is higher.        TTE (4/14/2023, personally reviewed):   • The left ventricular cavity size is normal with mild left ventricular hypertrophy and preserved systolic function. Estimated EF 65%. No regional wall motion abnormalities. Grade I diastolic dysfunction.     • The aortic valve is tricuspid. Aortic valve sclerosis. Trace aortic regurgitation.      • The visualized portions of the aortic root and ascending aorta are normal in size.     • The mitral valve is mildly thickened. Mild mitral annular calcification. Trace mitral regurgitation.       • The left atrium is severely dilated.      • The right ventricle is normal in size with preserved systolic function     • The pulmonic valve is not well seen.     • The tricuspid valve is normal in appearance. mild tricuspid regurgitation with an estimated RVSP of 34 mmHg.       • Right atrium is normal in size.     • The IVC is small and collapses > 50% with inspiration consistent with normal right atrial pressures.     • Small pericardial effusion,  mostly posterior.       • Compared to previous echocardiogram from 8/22/2022, there is no significant change        TTE (11/28/2022, personally reviewed):   • Normal right- and mildly elevated left-sided filling pressures (RA: 3 mmHg, PCWP: 13 mmHg)  • Elevated pulmonary artery pressures (60/22(35 mmHg)) in the setting of PCWP: 13 mmHg.   • Normal cardiac output and index (CO: 5.1 L/min, CI: 3.2 L/min/m2)  • PVR: 4.3 Wood units. SVR: 1840 dynes/sec/cm5      TTE (8/22/2022, personally reviewed):  · Normal-sized LV. Normal LV systolic function. Estimated EF 55- 60%. Wall motion appears grossly normal. Mild concentric left ventricular hypertrophy. Grade I LV diastolic dysfunction.  · Pulmonic valve not well visualized. Grossly normal pulmonic valve structure. Trace pulmonic valve regurgitation. No pulmonic valve stenosis.  · Aortic valve not well visualized. Aortic valve not well seen but likely trileaflet. Focal aortic valve calcification present. Sclerotic aortic valve leaflets. Mild aortic valve regurgitation. No aortic valve stenosis.  · RV not well visualized. Normal-sized RV. Normal RV systolic function.  · Normal-sized RA.  · There is a small loculated pericardial effusion anterior to the right atrium. No cardiac tamponade.  · Sclerotic mitral valve. Mitral valve calcification of the anterior leaflet present.  · Trace mitral valve regurgitation.  · No significant mitral valve stenosis.  · Normal-sized IVC. IVC demonstrates normal respiratory collapse.  · Tricuspid valve structure is grossly normal. Mild to moderate tricuspid valve regurgitation.  · Estimated RVSP = 50-55 mmHg.  · Mildly dilated LA.  · No previous echocardiogram available for comparison.     TTE (4/21/2022, outside study):  This result has an attachment that is not available.   •  A complete transthoracic echocardiogram (including 2D, color flow   Doppler, spectral Doppler and M-mode imaging) was performed using the   standard protocol. The study  quality was adequate.   •  The left ventricle is normal in size. There is moderately increased   wall thickness consistent with moderate concentric hypertrophy.   Endocardium is incompletely visualized, but no regional wall motion   abnormalities are noted in the visualized segments. Normal left   ventricular ejection fraction. The left ventricular ejection fraction by   visual estimate is 55% to 60%.   •  The right ventricle is normal in size. There is normal function of the   right ventricle.   •  The left atrium is normal in size. Left atrial volume is 58 mL.   •  The right atrial volume measures 52 mL. The right atrial volume index   measures 34 mL/m2.   •  There is trace mitral regurgitation. There is no mitral stenosis.   •  The aortic valve is trileaflet. There is mild aortic valve thickening.   There is no aortic stenosis. There is trace aortic regurgitation.   •  There is mild to moderate tricuspid regurgitation. Pulmonary artery   systolic pressure measures 52 mmHg. The pulmonary artery systolic pressure   is moderately elevated.   •  The aorta measures 3.2 cm at the sinus of Valsalva. The proximal   segment of the ascending aorta is mildly enlarged. The proximal segment of   the ascending aorta measures 3.4 cm. The proximal segment of the ascending   aorta measures 2.2 cm/m2, indexed for body surface area.   •  Trivial pericardial effusion present. There is no echocardiographic   evidence of cardiac tamponade.   •  The inferior vena cava is normal in size (diameter <21 mm) and   decreases >50% in size with inspiration, suggesting a normal right atrial   pressure of 3 mmHg (range 0-5 mm Hg).   •  Compared to the prior study of 3/13/2020, there are no significant   changes.        PFT (3/23/2022):      PFT (7/14/2021):      CT Chest (5/2/2021, outside study):  CLINICAL INFORMATION: Systemic sclerosis. Interstitial lung disease. Groundglass nodule     PROCEDURE: Unenhanced CT of the chest was performed using  thin section reconstructions. Images were obtained on inspiration and expiration.     COMPARISON: June and August 2020     FINDINGS:     LUNGS, PLEURA:     Groundglass opacities with reticulation with subpleural sparing in the lower lobes, without honeycombing or architectural distortion, unchanged.     Right upper lobe groundglass nodule, image 114 of series 3, 8 x 11 mm, previously 6 mm.     Expiratory images are of diagnostic quality and do not demonstrate evidence of clinically significant air trapping.     CARDIOVASCULAR, MEDIASTINUM, THYROID: Coronary calcifications. Trace pericardial effusion.     LYMPH NODES: Subcentimeter mediastinal lymph nodes, unchanged. Unchanged axillary lymph nodes.     SKELETON, CHEST WALL: No destructive bone lesion     UPPER ABDOMEN: Patulous esophagus. 3 mm right renal stone.       RHC (9/02/2020):  RA   8 mmHg   RV   40/5 mmHg   PA (mean)  44/16 (25) mmHg   PCWP   12 mmHg   CO   4.65 lpm (Thermodilution)   CI   2.65 lpm/m-squared   SVI    33 ml/beat/m-squared (lower limit normal 29)   PVR   2.8 mmHg/l/min (Wood units)   SVR   2064 dyne-sec-cm-5         PFT (3/18/2020):      CCTA (3/7/2024, personally reviewed):      LEFT MAIN: Patent.     LAD: Proximal: Calcified plaque with 30 to 50% stenosis.  Mid: Mixed plaque with  50 to 70% stenosis, CT FFR 0.89.  Distal: Patent.  D1: Patent  D2: Patent, CT FFR 0.88     LCX: The circumflex and 1 marginal branch are patent with normal CT FFR.     RCA: Proximal: Calcified plaque with minimal stenosis.  Otherwise the RCA, PDA,  and PLB are patent with normal CT FFR.     CORONARY CALCIUM COMPOSITE AGATSTON SCORE: 313  LM: LAD: 312    LCX: RCA: 1     This places the patient in the HICKS 96th risk percentile for age and gender  matched individuals.     VENTRICULAR FUNCTION AND WALL MOTION     LV EJECTION FRACTION: 66%  LV WALL MOTION: Normal    SUMMARY:  1. Two-vessel coronary artery disease as above that is moderate in severity.  CT  FFR is  normal..  2. There is mitral annular calcification and aortic sclerosis.  The right atrium  is enlarged.  There is left ventricular hypertrophy.  There is a moderate to  large circumferential pericardial effusion.  3. Normal left ventricular wall motion and systolic function.  4. Coronary calcium score 313, 96th percentile     TTE (3/13/2020, outside study):  · The study quality was good.   · The left ventricle is normal in size. Top normal left ventricular wall   thickness. There are no segmental wall motion abnormalities. The left   ventricular ejection fraction is 55-60% by visual estimate and 3D   echocardiography. Normal left ventricular ejection fraction.   · There is grade I (mild) LV diastolic dysfunction.   · The right ventricle is normal in size. There is normal function of the   right ventricle.   · The right atrium is mildly dilated.   · There is mild mitral valve anterior leaflet thickening. There is mild   mitral valve leaflet calcification. There is flattening of the mitral   leaflets without raphael prolapse. There is trace mitral regurgitation.   · The aortic valve is trileaflet. There is mild thickening of the aortic   valve. There is mild calcification of the aortic valve. There is no aortic   /stenosis. There is trace aortic regurgitation.   · There is mild tricuspid valve leaflet thickening. There is mild to   moderate tricuspid regurgitation. The pulmonary artery systolic pressure   is moderately elevated. Pulmonary artery systolic pressure measures 50   mmHg. There is mid-systolic notching of the RVOT VTI consistent with   elevated pulmonary vascular resistance.   · The inferior vena cava is normal in size (diameter <21 mm) and decreases   >50% in size with inspiration, suggesting a normal right atrial pressure   of 3 mmHg (range 0-5 mm Hg).   · Trivial loculated pericardial effusion present adjacent to the right   ventricle and adjacent to the right atrium.          Assessment / Plan:     1.  Musculoskeletal Pain Including Chest Pain  - Suspect this is auto-immune disease related inflammation; although, there has not been demonstrable improvement with Colchicine or steroids to date. Agree with steroid course.   - Consideration for Rheumatology consultation. As an outpatient, revisiting immune system suppression through biologics (or non-biologics) is reasonable.   - CCTA revealed moderate non-obstructive CAD, calcium score of 313  - Reports resolved diarrhea with lower Colchicine 0.3 mg daily.     2. Pericardial Effusion  - Moderate in size without clinical tamponade  - Will recheck TTE at one month if no interim clinical change - hemodynamically stable  - Continue Colchicine 0.3 mg daily    3. Pulmonary Hypertension (WHO Group I, NYHA/WHO FC III):   - Occurring in the background of Systemic Scleroderma with ILD. August 2022 TTE showed normal RV size and function, but progressive exertional decline, worsened DLCO and resting tachycardia suggest there may be progression of her pulmonary vascular disease and limitation of cardiac output. This was proven with 11/2022 RHC showing mean PA 35 mmHg (with PCWP 13 mmHg) and PVR 4.3 Wood units. June 2023 TTE showed increase in RVSP. New TTE with RVSP decreased to 40s (even with higher estimated RAP).  - Continue uninterrupted Sildenafil and Macitentan.     4. HTN:   - Mostly controlled  - Continued Amlodipine.   - Would start HCTZ as second agent, if needed    5. Systemic Scleroderma / Raynaud's Disease:   - Per Rheumatology (Outpatient: Dr. Giraldo).      6. ILD:   - Per Pulmonology and Rheumatology (Dr. Giraldo) outpatient.   - She has not tolerated trials of Mycophenolate.     7. PE:   - She has had elevated D-dimer and elevated probability on V/Q scans, but CTA Chest has consistently not shown evidence for acute or chronic thromboembolism. Apixaban has been discontinued.     8. CAD  - LAD and RCA non-obstructive disease on 3/7 CCTA  - LDL goal < 70  - Continue  Clopidogrel, no ASA due to allergy.   - Continue Atorvastatin 20 mg daily      Timothy Arvizu,

## 2024-03-15 NOTE — PLAN OF CARE
Care Coordination Discharge Plan Note     Discharge Needs Assessment  Concerns to be Addressed: discharge planning, care coordination/care conferences  Current Discharge Risk: chronically ill, physical impairment    Anticipated Discharge Plan  Anticipated Discharge Disposition: home with home health  Type of Home Care Services: nursing, home PT, home OT        Patient Choice  Offered/Gave Vendor List: yes  Patient's Choice of Community Agency(s): [facility]    Patient and/or patient guardian/advocate was made aware of their right to choose a provider. A list of eligible providers was presented and reviewed with the patient and/or patient guardian/advocate in written and/or verbal form. The list delineates providers in the patient’s desired geographic area who are participating in the Medicare program and/or providers contracted with the patient’s primary insurance. The Medicare list and quality ratings were obtained from the Medicare.gov [medicare.gov] website.    ---------------------------------------------------------------------------------------------------------------------    Interdisciplinary Discharge Plan Review:  Participants:, patient, social work/services, pharmacy, physician, nursing    Concerns Comments: per discharge planning rounds ENRIQUE is tomorrow to home with [facility], agency updated. Spoke with patient and she is in agreement with dispo plan, she will have a ride available, she will be in touch with her home health aides. Will continue to follow until DC complete.    Discharge Plan:   Disposition/Destination: Home Health Care - Other / Home  Discharge Facility:    Community Resources:      Discharge Transportation:  Is Out of Hospital DNR needed at Discharge: no  Does patient need discharge transport? No

## 2024-03-15 NOTE — PROGRESS NOTES
Acute Care Surgery Service (Pager: [phone])     Progress Note    Subjective   64 yo F a/f large pericardial effusion, c/f possible ileus vs obstruction seen on CT scan     Interval History: Patient seen resting comfortably in bed. States she had 3x episodes of vomiting and 5x episodes of diarrhea overnight after eating soft foods. Mild abdominal pain. Not presently nauseous. SBFT yesterday showed contrast to the colon in 6 hours, excluding high grade obstruction.      Objective     Vitals:    03/15/24 0315   BP: 118/63   Pulse: 61   Resp: 17   Temp: 37.1 °C (98.7 °F)   SpO2:        No intake or output data in the 24 hours ending 03/15/24 0706    No intake/output data recorded.     Physical Exam  General: awake and alert, well appearing, no acute distress  HEENT: normocephalic, atraumatic, EOM intact, conjunctiva clear, sclera nonicteric  CV: normal rate, normotensive  Pulm: normal work of breathing, no acute distress  Abd: Soft, minimal distention, diffusely mildly tender to palpation, worse in lower quadrants  Skin: incisions CDI, no surrounding erythema  Extr: no obvious abnormality, moving extremities spontaneously  Neuro: Grossly normal    Labs:  Results from last 7 days   Lab Units 03/14/24  0410 03/13/24  0846 03/12/24  0723   WBC K/uL 12.71* 9.30 12.67*   HEMOGLOBIN g/dL 9.3* 9.1* 9.8*   HEMATOCRIT % 30.7* 29.6* 31.8*   PLATELETS K/uL 287 290 287     Results from last 7 days   Lab Units 03/14/24  0410 03/13/24  0846 03/12/24  0723   SODIUM mEQ/L 138 138 137   POTASSIUM mEQ/L 4.5 4.6 4.7   CHLORIDE mEQ/L 106 107 107   CO2 mEQ/L 24 22 22   BUN mg/dL 23 24 23   CREATININE mg/dL 0.9 0.9 0.9   CALCIUM mg/dL 9.0 9.0 8.9   ALBUMIN g/dL 3.1* 3.0* 3.1*   BILIRUBIN TOTAL mg/dL 0.3 0.3 0.3   ALK PHOS IU/L 49 49 50   ALT IU/L 14 15 16   AST IU/L 12* 12* 15   GLUCOSE mg/dL 81 95 77     Recent Labs   Lab 03/11/24  1731   INR 1.0   PTT 26           Imaging:  I have reviewed the Imaging from the last 24 hrs.  FLUOROSCOPY  SMALL BOWEL STUDY    Result Date: 3/14/2024  CLINICAL HISTORY: assess for SBO, needs to follow steroid pre-treatment protocol, will drink gastrograffin COMMENT: Therapeutic small bowel study performed and compared with 3/13/2024. The patient drank water-soluble Omnipaque contrast material. Contrast material is seen within colon at 6 hours, excluding high-grade small bowel obstruction.     IMPRESSION: Please see comment.      Assessment:  62 yo F a/f large pericardial effusion, c/f possible ileus vs obstruction seen on CT scan     Plan:  - No acute surgical intervention indicated at this time  - SBFT showing transit of contrast through the colon   - Advance diet as tolerated, back down to clears/NPO for vomiting. Rec low residue/low fiber   - Surgery will sign off at this time. Please page [phone] with any further concerns     Joana Bermudez MD  General Surgery Resident PGY-1  [facility]  [facility]  Please page [phone] with any questions or concerns.

## 2024-03-15 NOTE — TELEPHONE ENCOUNTER
Please keep the 6MWT and June appointment. Reschedule TTE and HFU as above.     ThanksTimothy 2022 17:39

## 2024-03-15 NOTE — PROGRESS NOTES
Toma visited with patient. Offered supportive presence and explained spiritual care services. Will remain available as needed or desired. -Charted by Rev. Pavan Childs [phone]

## 2024-03-16 VITALS
WEIGHT: 98.33 LBS | OXYGEN SATURATION: 95 % | DIASTOLIC BLOOD PRESSURE: 6 MMHG | HEIGHT: 66 IN | TEMPERATURE: 99.2 F | HEART RATE: 86 BPM | RESPIRATION RATE: 18 BRPM | BODY MASS INDEX: 15.8 KG/M2 | SYSTOLIC BLOOD PRESSURE: 136 MMHG

## 2024-03-16 LAB
ALBUMIN SERPL-MCNC: 3 G/DL (ref 3.5–5.7)
ALP SERPL-CCNC: 45 IU/L (ref 34–125)
ALT SERPL-CCNC: 10 IU/L (ref 7–52)
ANION GAP SERPL CALC-SCNC: 7 MEQ/L (ref 3–15)
AST SERPL-CCNC: 9 IU/L (ref 13–39)
BILIRUB SERPL-MCNC: 0.3 MG/DL (ref 0.3–1.2)
BUN SERPL-MCNC: 25 MG/DL (ref 7–25)
CALCIUM SERPL-MCNC: 8.5 MG/DL (ref 8.6–10.3)
CHLORIDE SERPL-SCNC: 105 MEQ/L (ref 98–107)
CO2 SERPL-SCNC: 24 MEQ/L (ref 21–31)
CREAT SERPL-MCNC: 0.8 MG/DL (ref 0.6–1.2)
EGFRCR SERPLBLD CKD-EPI 2021: >60 ML/MIN/1.73M*2
ERYTHROCYTE [DISTWIDTH] IN BLOOD BY AUTOMATED COUNT: 19.6 % (ref 11.7–14.4)
GLUCOSE BLD-MCNC: 99 MG/DL (ref 70–99)
GLUCOSE SERPL-MCNC: 79 MG/DL (ref 70–99)
HCT VFR BLD AUTO: 32.8 % (ref 35–45)
HGB BLD-MCNC: 10.1 G/DL (ref 11.8–15.7)
MCH RBC QN AUTO: 26.9 PG (ref 28–33.2)
MCHC RBC AUTO-ENTMCNC: 30.8 G/DL (ref 32.2–35.5)
MCV RBC AUTO: 87.5 FL (ref 83–98)
PDW BLD AUTO: 11 FL (ref 9.4–12.3)
PLATELET # BLD AUTO: 241 K/UL (ref 150–369)
POCT TEST: NORMAL
POTASSIUM SERPL-SCNC: 4.2 MEQ/L (ref 3.5–5.1)
PROT SERPL-MCNC: 6.2 G/DL (ref 6–8.2)
RBC # BLD AUTO: 3.75 M/UL (ref 3.93–5.22)
SODIUM SERPL-SCNC: 136 MEQ/L (ref 136–145)
WBC # BLD AUTO: 13.91 K/UL (ref 3.8–10.5)

## 2024-03-16 PROCEDURE — 63700000 HC SELF-ADMINISTRABLE DRUG: Performed by: STUDENT IN AN ORGANIZED HEALTH CARE EDUCATION/TRAINING PROGRAM

## 2024-03-16 PROCEDURE — 36415 COLL VENOUS BLD VENIPUNCTURE: CPT

## 2024-03-16 PROCEDURE — 63700000 HC SELF-ADMINISTRABLE DRUG

## 2024-03-16 PROCEDURE — 25000000 HC PHARMACY GENERAL: Performed by: STUDENT IN AN ORGANIZED HEALTH CARE EDUCATION/TRAINING PROGRAM

## 2024-03-16 PROCEDURE — 80053 COMPREHEN METABOLIC PANEL: CPT

## 2024-03-16 PROCEDURE — 85027 COMPLETE CBC AUTOMATED: CPT

## 2024-03-16 PROCEDURE — 99238 HOSP IP/OBS DSCHRG MGMT 30/<: CPT | Performed by: INTERNAL MEDICINE

## 2024-03-16 RX ORDER — GUAIFENESIN 600 MG/1
600 TABLET, EXTENDED RELEASE ORAL 2 TIMES DAILY PRN
Status: DISCONTINUED | OUTPATIENT
Start: 2024-03-16 | End: 2024-03-16 | Stop reason: HOSPADM

## 2024-03-16 RX ORDER — PREDNISONE 20 MG/1
20 TABLET ORAL DAILY
Qty: 3 TABLET | Refills: 0 | Status: SHIPPED | OUTPATIENT
Start: 2024-03-16 | End: 2024-03-16 | Stop reason: HOSPADM

## 2024-03-16 RX ORDER — PREDNISONE 5 MG/1
TABLET ORAL
Qty: 58 TABLET | Refills: 0 | Status: SHIPPED | OUTPATIENT
Start: 2024-03-16 | End: 2024-04-16 | Stop reason: ALTCHOICE

## 2024-03-16 RX ADMIN — SILDENAFIL 20 MG: 20 TABLET, FILM COATED ORAL at 09:19

## 2024-03-16 RX ADMIN — AMLODIPINE BESYLATE 10 MG: 10 TABLET ORAL at 09:18

## 2024-03-16 RX ADMIN — MUPIROCIN 1 APPLICATION: 20 OINTMENT TOPICAL at 11:38

## 2024-03-16 RX ADMIN — GUAIFENESIN 600 MG: 600 TABLET ORAL at 04:58

## 2024-03-16 RX ADMIN — LEVOTHYROXINE SODIUM 100 MCG: 0.1 TABLET ORAL at 04:58

## 2024-03-16 RX ADMIN — Medication 1000 UNITS: at 09:18

## 2024-03-16 RX ADMIN — PANTOPRAZOLE SODIUM 40 MG: 40 INJECTION, POWDER, FOR SOLUTION INTRAVENOUS at 09:19

## 2024-03-16 RX ADMIN — SILDENAFIL 20 MG: 20 TABLET, FILM COATED ORAL at 13:42

## 2024-03-16 RX ADMIN — PREDNISONE 20 MG: 20 TABLET ORAL at 09:18

## 2024-03-16 RX ADMIN — Medication 100 MCG: at 09:18

## 2024-03-16 RX ADMIN — COLCHICINE 0.3 MG: 0.6 TABLET ORAL at 09:17

## 2024-03-16 ASSESSMENT — COGNITIVE AND FUNCTIONAL STATUS - GENERAL
CLIMB 3 TO 5 STEPS WITH RAILING: 1 - TOTAL
STANDING UP FROM CHAIR USING ARMS: 2 - A LOT
MOVING TO AND FROM BED TO CHAIR: 2 - A LOT
WALKING IN HOSPITAL ROOM: 1 - TOTAL

## 2024-03-16 NOTE — NURSING NOTE
Pt stable for discharge, all belongings returned, IV access removed, Discharge summary reviewed with Pt all questions answered

## 2024-03-16 NOTE — PROGRESS NOTES
Internal Medicine  Daily Progress Note       SUBJECTIVE   This is a 63 y.o. year-old female admitted on 3/3/2024 with Pericardial effusion [I31.39]  Interstitial lung disease (CMS/HCC) [J84.9]  Hypoxia [R09.02].    Interval History:   Patient hemodynamically stable overnight. She reports her pain is still there but her nausea improved. She is still     OBJECTIVE   Vital signs in last 24 hours:  Temp:  [36.6 °C (97.9 °F)-36.8 °C (98.2 °F)] 36.6 °C (97.9 °F)  Heart Rate:  [60-77] 60  Resp:  [18] 18  BP: (124-138)/(58-63) 126/58  SpO2:  [96 %-100 %] 100 %  Oxygen Therapy: None (Room air)    Weight & I/Os:  Weights (last 5 days)     Date/Time Weight    03/16/24 0500 44.6 kg (98 lb 5.2 oz)    03/15/24 0315 44.8 kg (98 lb 12.3 oz)    03/14/24 0630 43 kg (94 lb 12.8 oz)    03/13/24 0555 43.5 kg (96 lb)    03/12/24 0611 46.4 kg (102 lb 4.8 oz)    03/11/24 0539 46.5 kg (102 lb 8.2 oz)          Intake/Output Summary (Last 24 hours) at 3/16/2024 0659  Last data filed at 3/16/2024 0230  24 Hour Net Input/Output from 7AM Yesterday   Intake 360 ml   Output 400 ml   Net -40 ml        PHYSICAL EXAMINATION   Physical Exam  Neck:      Comments: JVD  Cardiovascular:      Rate and Rhythm: Normal rate and regular rhythm.      Pulses: Normal pulses.      Heart sounds: Normal heart sounds.   Pulmonary:      Effort: Pulmonary effort is normal.      Breath sounds: Normal breath sounds.   Abdominal:      Comments: Diffusely tender abdomen    Musculoskeletal:      Right lower leg: No edema.      Left lower leg: No edema.   Neurological:      Mental Status: She is alert. Mental status is at baseline.          LINES, CATHETERS, DRAINS, AIRWAYS, & WOUNDS   Lines, drains, airways, & wounds:  Peripheral IV (Adult) 03/11/24 Left;Posterior Forearm (Active)   Number of days: 5       Wound Other (comment) Anterior;Left Toe (Great) (Active)   Number of days: 12       Wound Pressure Injury Anterior;Left Ankle (Active)   Number of days: 12       Wound  Pressure Injury Anterior;Right Ankle (Active)   Number of days: 12       Wound Other (comment) Anterior;Right Toe (2nd) (Active)   Number of days: 12        LABS, IMAGING, & TELE   Labs:  I have reviewed the patient's pertinent labs. Pertinent labs are within normal limits.    Results from last 7 days   Lab Units 03/16/24  0708 03/15/24  0514 03/14/24  0410   WBC K/uL 13.91* 14.20* 12.71*   HEMOGLOBIN g/dL 10.1* 9.5* 9.3*   HEMATOCRIT % 32.8* 30.8* 30.7*   PLATELETS K/uL 241 256 287       Results from last 7 days   Lab Units 03/16/24  0708 03/15/24  0514 03/14/24  0410   SODIUM mEQ/L 136 137 138   POTASSIUM mEQ/L 4.2 4.1 4.5   CHLORIDE mEQ/L 105 104 106   CO2 mEQ/L 24 25 24   BUN mg/dL 25 29* 23   CREATININE mg/dL 0.8 1.0 0.9   CALCIUM mg/dL 8.5* 8.6 9.0   ALBUMIN g/dL 3.0* 2.9* 3.1*   BILIRUBIN TOTAL mg/dL 0.3 0.3 0.3   ALK PHOS IU/L 45 44 49   ALT IU/L 10 12 14   AST IU/L 9* 10* 12*   GLUCOSE mg/dL 79 73 81     Imaging personally reviewed (does not include unread studies):  No results found.   ASSESSMENT & PLAN   Nausea and vomiting  Assessment & Plan  Patient developed nausea and dark vomiting on 3/11 afternoon   CT abdomen showed dilated loops of bowel that could represent SBO vs ileus   Surgery team consulted and recommended CT abdomen with IV contrast  Ct abdomen possible obstruction    SBF negative   Resumed feeding and tolerating   Bowel movements some diarrhea but improving       Interstitial lung disease (CMS/HCC)  Assessment & Plan  Patient has a PMH of ILD, CTEPH and phtn on sildenafil, mecetentan, and scleroderma   Has been discussing Tocilizumab with rheumatology but hasn't started it yet   Echo: EF 60%, No regional wall abnormalities, Grade II diastolic dysfunction  Continue pain meds as able     Impression: confirmed ILD and precap phtn 2/2 scleroderma, pw worsening SHOB, cp, and new pericardial effusion     - continue home meds   - cont PTA amlodipine   - continue phtn meds as able   - zane  and sildenafil       Pulmonary hypertension (CMS/HCC)  Assessment & Plan  Patient has a pMH of pulmonary htn, scleroderma, CTEPh and precapillary pHTN   Sees Dr. Arvizu   Multiple echos and RHC and LHC   Endorses continued worsening SHOB since 10/2022 with BOWSER, palpitations, bendopnea   3/4: Echo: EF 60%, No regional wall abnormalities, Grade II diastolic dysfunction  Most recent echo 6/2023 showed EF 60%, mild TR, RVSP 55, increased RV systolic pressures compared to April   Had a PE in march 2023, was on elequis however had hemoptysis and it was discontinued   VQ in sept 2023 showed mod-high prob of PE disease and CTA PE showed no acute filling defect but enlargmeent of PA cw PHTN    recently started mecitentan aug/sept 2023 wo much subjective change     Ddx: multifactorial phtn scleroderma ILD vs. CTEPH vs. Pericardial effusion vs. Combo thereof     Impression: 62yo lady with PMH of raynauds, phtn, cutaneous scleroderma, ILD, CTEPh pw worsening SHOB x 24 hours with new pericardial effusion seen on CT. Prior echos have shown increasing RVSP and worsening phtn, for which she is on sildenafil, mecitentan.     - continue sildenafil   - continue mecitentan   - cards on board   - closely monitor BP in ICU   - SCDs for DVT given hemoptysis            * Pericardial effusion  Assessment & Plan  Ddx: autoimmune vs. Myocardial injury vs. Bacterial infection vs. Viral vs. Malignant   Patient has a PMH of phtn, scleroderma, CTEPH   pw increasing chest pressure x 1 day, without radiation into arms  Endorses continued GERD sxs however no new NVD  S/p Methylprednisolone x1 in ED   CT shows pericardial effusion   Echo: EF 60%, No regional wall abnormalities, Grade II diastolic dysfunction  ; Trop 12.9   [id number] and CRP 8.9   MRSA Nares: Negative  TB panel negative 2/27   BCx: NGTD      - started colchicine but dropping dose due to diarrhea to colchicine 0.3.   - on low dose prednisone 20mg daily (as indomethacin alternative,  pt reports allerges to ASA and Ibuprofen)   - coronary CTA showed moderate 2 vessel CAD. Recommend plavix and statin but patient refusing medications.   - TB panel 2/27 negative   - CBC daily  - CMP daily   - scds for dvt ppx given hemooptysis   - Allergist recommendations appreciated  - VS frequently       Hypothyroid  Assessment & Plan  Patient has a PMH of hypothyroidism 2/2 thyroidectomy for follicular carcinoma of the thyroid     Note H/o Total Thyroidectomy (Dr. Pacheco at [facility]; 4/2013)  - continue home thyroid medications Synthroid 100mcg qd    Acute on chronic heart failure with preserved ejection fraction (CMS/HCC)  Assessment & Plan  Patient has a PMH of HFPEF with EF 65%   Endorsing worsening HSOB, denies any ANGEL LUIS, UFD or medication complaince difficulty   CT shows pericardial effusion without pleural effusions or pulmonary edema   Exam negative for ANGEL LUIS, crackles     Impression: 62 yo lady with PMH of scleroderma and PE, phtn on mecitentan and sildenafil pw worseing BOWSER and shob with new pericardial effusion on ct imaging, negative for pulmonary edema or other s/sxs of CHF exacerbation. Unlikely this is a CHF exacerbation.     - continue to monitor   - continue home medications     History of pulmonary embolism  Assessment & Plan  Patient has a PMH of phtn and PE tx w/ apixiban however cb epistaxis and hemoptysis   Stopped elequis in June 2023   pw worsening shob and new pericardial effusion   Continues to endorse hemoptysis and epistaxis worsening over last few days   Echo EF 60%, no regional wall motion abnormalities, grade II diastolic dysfunction    - scds for now for dvt ppx       Open wound of right foot  Assessment & Plan  Patient has multiple wounds noted on her feet bilaterally   Wound images from admission in the media tab   Is on CCB at home   States wounds have difficulty healing  Also now with poor nutritional status iso scleroderma and ILD     Ddx: nonhealing ulcers 2/2 autoimmune disease vs.  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 Has chronic nonhealing ulcers to her feet, most likely secondary to her scleroderma and poor nutritional status     - cont CCB  - wound ostomy on board   - consider pain management   - nutrition consult        VTE Assessment: Padua    VTE Prophylaxis: Current anticoagulants:    •None      Code Status: Full Code  Estimated discharge date: 3/16/2024         ATTENDING DOCUMENTATION  ALSO SEE ATTENDING ATTESTATION SECTION OF NOTE

## 2024-03-16 NOTE — PROGRESS NOTES
Per dr Gomez at [facility], pt is medically cleared for discharge to home with [facility]. [facility] accepted pt and aware of ENRIQUE in [facility]. I called and spoke with pt's daughter Tiffanie [phone]  And discussed dispo plan - agreeable, no questions or concerns. Tiffanie confirmed family will arrange transport and [facility] is avail and arranged. No additional dc needs at this time,

## 2024-04-08 DIAGNOSIS — M34.9 SCLERODERMA (CMS/HCC): Primary | ICD-10-CM

## 2024-04-08 DIAGNOSIS — Z11.59 ENCOUNTER FOR SCREENING FOR OTHER VIRAL DISEASES: ICD-10-CM

## 2024-04-09 ENCOUNTER — TRANSCRIBE ORDERS (OUTPATIENT)
Dept: RADIOLOGY | Facility: HOSPITAL | Age: 64
End: 2024-04-09

## 2024-04-09 ENCOUNTER — HOSPITAL ENCOUNTER (OUTPATIENT)
Dept: RADIOLOGY | Facility: HOSPITAL | Age: 64
Discharge: HOME | End: 2024-04-09
Attending: STUDENT IN AN ORGANIZED HEALTH CARE EDUCATION/TRAINING PROGRAM
Payer: COMMERCIAL

## 2024-04-09 ENCOUNTER — HOSPITAL ENCOUNTER (OUTPATIENT)
Dept: RADIOLOGY | Facility: HOSPITAL | Age: 64
Discharge: HOME | End: 2024-04-09
Attending: INTERNAL MEDICINE
Payer: COMMERCIAL

## 2024-04-09 ENCOUNTER — APPOINTMENT (OUTPATIENT)
Dept: LAB | Facility: HOSPITAL | Age: 64
End: 2024-04-09
Attending: INTERNAL MEDICINE
Payer: COMMERCIAL

## 2024-04-09 DIAGNOSIS — S93.402A SPRAIN OF LEFT ANKLE, UNSPECIFIED LIGAMENT, INITIAL ENCOUNTER: Primary | ICD-10-CM

## 2024-04-09 DIAGNOSIS — R06.3 PERIODIC BREATHING: Primary | ICD-10-CM

## 2024-04-09 DIAGNOSIS — S93.402A SPRAIN OF LEFT ANKLE, UNSPECIFIED LIGAMENT, INITIAL ENCOUNTER: ICD-10-CM

## 2024-04-09 DIAGNOSIS — M34.9 SCLERODERMA (CMS/HCC): ICD-10-CM

## 2024-04-09 DIAGNOSIS — R06.3 PERIODIC BREATHING: ICD-10-CM

## 2024-04-09 DIAGNOSIS — Z11.59 ENCOUNTER FOR SCREENING FOR OTHER VIRAL DISEASES: ICD-10-CM

## 2024-04-09 LAB
ALBUMIN SERPL-MCNC: 4.1 G/DL (ref 3.5–5.7)
ALP SERPL-CCNC: 67 IU/L (ref 34–125)
ALT SERPL-CCNC: 12 IU/L (ref 7–52)
ANION GAP SERPL CALC-SCNC: 11 MEQ/L (ref 3–15)
AST SERPL-CCNC: 14 IU/L (ref 13–39)
BACTERIA URNS QL MICRO: ABNORMAL /HPF
BASOPHILS # BLD: 0.06 K/UL (ref 0.01–0.1)
BASOPHILS NFR BLD: 0.5 %
BILIRUB SERPL-MCNC: 0.4 MG/DL (ref 0.3–1.2)
BILIRUB UR QL STRIP.AUTO: NEGATIVE MG/DL
BUN SERPL-MCNC: 19 MG/DL (ref 7–25)
CALCIUM SERPL-MCNC: 9.9 MG/DL (ref 8.6–10.3)
CHLORIDE SERPL-SCNC: 108 MEQ/L (ref 98–107)
CLARITY UR REFRACT.AUTO: CLEAR
CO2 SERPL-SCNC: 22 MEQ/L (ref 21–31)
COLOR UR AUTO: YELLOW
CREAT SERPL-MCNC: 0.8 MG/DL (ref 0.6–1.2)
CREAT UR-MCNC: 59.4 MG/DL
DIFFERENTIAL METHOD BLD: ABNORMAL
EGFRCR SERPLBLD CKD-EPI 2021: >60 ML/MIN/1.73M*2
EOSINOPHIL # BLD: 0.02 K/UL (ref 0.04–0.36)
EOSINOPHIL NFR BLD: 0.2 %
ERYTHROCYTE [DISTWIDTH] IN BLOOD BY AUTOMATED COUNT: 20 % (ref 11.7–14.4)
GLUCOSE SERPL-MCNC: 82 MG/DL (ref 70–99)
GLUCOSE UR STRIP.AUTO-MCNC: NEGATIVE MG/DL
HBV CORE AB SER QL: NONREACTIVE
HBV SURFACE AB SER QL: NONREACTIVE
HBV SURFACE AG SER QL: NONREACTIVE
HCT VFR BLD AUTO: 39.7 % (ref 35–45)
HCV AB SER QL: NONREACTIVE
HGB BLD-MCNC: 12.1 G/DL (ref 11.8–15.7)
HGB UR QL STRIP.AUTO: NEGATIVE
HYALINE CASTS #/AREA URNS LPF: ABNORMAL /LPF
IMM GRANULOCYTES # BLD AUTO: 0.05 K/UL (ref 0–0.08)
IMM GRANULOCYTES NFR BLD AUTO: 0.4 %
KETONES UR STRIP.AUTO-MCNC: NEGATIVE MG/DL
LEUKOCYTE ESTERASE UR QL STRIP.AUTO: NEGATIVE
LYMPHOCYTES # BLD: 1.44 K/UL (ref 1.2–3.5)
LYMPHOCYTES NFR BLD: 12.7 %
MCH RBC QN AUTO: 28.2 PG (ref 28–33.2)
MCHC RBC AUTO-ENTMCNC: 30.5 G/DL (ref 32.2–35.5)
MCV RBC AUTO: 92.5 FL (ref 83–98)
MONOCYTES # BLD: 0.28 K/UL (ref 0.28–0.8)
MONOCYTES NFR BLD: 2.5 %
MUCOUS THREADS URNS QL MICRO: ABNORMAL /LPF
NEUTROPHILS # BLD: 9.47 K/UL (ref 1.7–7)
NEUTS SEG NFR BLD: 83.7 %
NITRITE UR QL STRIP.AUTO: NEGATIVE
NRBC BLD-RTO: 0 %
PDW BLD AUTO: 11.7 FL (ref 9.4–12.3)
PH UR STRIP.AUTO: 6 [PH]
PLATELET # BLD AUTO: 301 K/UL (ref 150–369)
POTASSIUM SERPL-SCNC: 4.4 MEQ/L (ref 3.5–5.1)
PROT SERPL-MCNC: 8.3 G/DL (ref 6–8.2)
PROT UR QL STRIP.AUTO: 2
PROT UR-MCNC: 149 MG/DL
PROT/CREAT UR: 2.51 MG/G CREAT
RBC # BLD AUTO: 4.29 M/UL (ref 3.93–5.22)
RBC #/AREA URNS HPF: ABNORMAL /HPF
SODIUM SERPL-SCNC: 141 MEQ/L (ref 136–145)
SP GR UR REFRACT.AUTO: 1.02
SQUAMOUS URNS QL MICRO: ABNORMAL /HPF
UROBILINOGEN UR STRIP-ACNC: 0.2 EU/DL
WBC # BLD AUTO: 11.32 K/UL (ref 3.8–10.5)
WBC #/AREA URNS HPF: ABNORMAL /HPF

## 2024-04-09 PROCEDURE — 87340 HEPATITIS B SURFACE AG IA: CPT

## 2024-04-09 PROCEDURE — 86038 ANTINUCLEAR ANTIBODIES: CPT

## 2024-04-09 PROCEDURE — 85025 COMPLETE CBC W/AUTO DIFF WBC: CPT

## 2024-04-09 PROCEDURE — 30000001 MISCELLANEOUS LAB TEST (NOT TO BE USED FOR COVID19)

## 2024-04-09 PROCEDURE — 85612 VIPER VENOM PROTHROMBIN TIME: CPT

## 2024-04-09 PROCEDURE — 86160 COMPLEMENT ANTIGEN: CPT

## 2024-04-09 PROCEDURE — 86235 NUCLEAR ANTIGEN ANTIBODY: CPT

## 2024-04-09 PROCEDURE — 80053 COMPREHEN METABOLIC PANEL: CPT

## 2024-04-09 PROCEDURE — 86146 BETA-2 GLYCOPROTEIN ANTIBODY: CPT

## 2024-04-09 PROCEDURE — 30000001 HC MISCELLANEOUS TEST

## 2024-04-09 PROCEDURE — 86704 HEP B CORE ANTIBODY TOTAL: CPT

## 2024-04-09 PROCEDURE — 86225 DNA ANTIBODY NATIVE: CPT

## 2024-04-09 PROCEDURE — 73630 X-RAY EXAM OF FOOT: CPT | Mod: LT

## 2024-04-09 PROCEDURE — 86706 HEP B SURFACE ANTIBODY: CPT

## 2024-04-09 PROCEDURE — 86147 CARDIOLIPIN ANTIBODY EA IG: CPT

## 2024-04-09 PROCEDURE — 86803 HEPATITIS C AB TEST: CPT

## 2024-04-09 PROCEDURE — 81003 URINALYSIS AUTO W/O SCOPE: CPT

## 2024-04-09 PROCEDURE — 36415 COLL VENOUS BLD VENIPUNCTURE: CPT

## 2024-04-09 PROCEDURE — 82570 ASSAY OF URINE CREATININE: CPT

## 2024-04-09 PROCEDURE — 71046 X-RAY EXAM CHEST 2 VIEWS: CPT

## 2024-04-10 ENCOUNTER — HOSPITAL ENCOUNTER (OUTPATIENT)
Dept: CARDIOLOGY | Facility: HOSPITAL | Age: 64
Discharge: HOME | End: 2024-04-10
Attending: PHYSICIAN ASSISTANT
Payer: COMMERCIAL

## 2024-04-10 VITALS
WEIGHT: 98 LBS | HEIGHT: 66 IN | SYSTOLIC BLOOD PRESSURE: 128 MMHG | DIASTOLIC BLOOD PRESSURE: 72 MMHG | BODY MASS INDEX: 15.75 KG/M2

## 2024-04-10 DIAGNOSIS — I27.20 PULMONARY HYPERTENSION (CMS/HCC): ICD-10-CM

## 2024-04-10 LAB
AORTIC VALVE MEAN VELOCITY: 1.56 M/S
AORTIC VALVE VELOCITY TIME INTEGRAL: 39.4 CM
ASCENDING AORTA: 3.4 CM
AV MEAN GRADIENT: 11 MMHG
AV PEAK GRADIENT: 22 MMHG
AV PEAK VELOCITY-S: 2.35 M/S
AV VALVE AREA INDEX: 1.49
AV VALVE AREA: 1.5 CM2
AV VELOCITY RATIO: 0.52
AVA (VTI): 2.15 CM2
BSA FOR ECHO PROCEDURE: 1.44 M2
C3 SERPL-MCNC: 142 MG/DL (ref 78–175)
C4 SERPL-MCNC: 44 MG/DL (ref 12.9–39.2)
DOP CALC LVOT STROKE VOLUME: 84.71 CM3
EDV (BP): 58.5 CM3
EF (A4C): 59.1 %
EF A2C: 60.2 %
EJECTION FRACTION: 60.2 %
ESV (BP): 23.3 CM3
FRACTIONAL SHORTENING: 35.07 %
INTERVENTRICULAR SEPTUM: 1.27 CM
LA ESV (BP): 43.6 CM3
LA ESV INDEX (A2C): 26.67 CM3/M2
LA ESV INDEX (BP): 30.28 CM3/M2
LA PPP-IMP: NORMAL
LAAS-AP2: 15.3 CM2
LAAS-AP4: 17.8 CM2
LAD 2D: 3.2 CM
LALD A4C: 4.95 CM
LALD A4C: 6.09 CM
LAV-S: 38.4 CM3
LEFT ATRIUM VOLUME INDEX: 28.47 CM3/M2
LEFT ATRIUM VOLUME: 41 CM3
LEFT INTERNAL DIMENSION IN SYSTOLE: 2.61 CM (ref 2.23–3.37)
LEFT VENTRICLE DIASTOLIC VOLUME INDEX: 44.17 CM3/M2
LEFT VENTRICLE DIASTOLIC VOLUME: 63.6 CM3
LEFT VENTRICLE SYSTOLIC VOLUME INDEX: 18.06 CM3/M2
LEFT VENTRICLE SYSTOLIC VOLUME: 26 CM3
LEFT VENTRICULAR INTERNAL DIMENSION IN DIASTOLE: 4.02 CM (ref 3.75–5.2)
LEFT VENTRICULAR POSTERIOR WALL IN END DIASTOLE: 1.27 CM (ref 0.48–0.88)
LV DIASTOLIC VOLUME: 52.2 CM3
LV ESV (APICAL 2 CHAMBER): 20.8 CM3
LVAD-AP2: 23.1 CM2
LVAD-AP4: 25.1 CM2
LVAS-AP2: 13.3 CM2
LVAS-AP4: 14.5 CM2
LVEDVI(A2C): 36.25 CM3/M2
LVEDVI(BP): 40.63 CM3/M2
LVESVI(A2C): 14.44 CM3/M2
LVESVI(BP): 16.18 CM3/M2
LVLD-AP2: 8.59 CM
LVLD-AP4: 8.28 CM
LVLS-AP2: 7.27 CM
LVLS-AP4: 7.2 CM
LVOT 2D: 2.3 CM
LVOT A: 4.15 CM2
LVOT MG: 3 MMHG
LVOT MV: 0.76 M/S
LVOT PEAK VELOCITY: 1.08 M/S
LVOT PG: 5 MMHG
LVOT STROKE VOLUME INDEX: 58.83 ML/M2
LVOT VTI: 20.4 CM
MLH CV ECHO AVA INDEX VELOCITY RATIO: 1
MV E'TISSUE VEL-LAT: 0.06 M/S
MV E'TISSUE VEL-MED: 0.05 M/S
POSTERIOR WALL: 1.27 CM
PROTHROMBIN TIME: 12.9 SEC (ref 12.2–14.5)
RVOT VMAX: 0.76 M/S
RVOT VTI: 12.7 CM
SCREEN APTT: 38 SEC (ref 31–44)
SCREEN DRVVT: 32 SEC (ref 31–44)
SEPTAL TISSUE DOPPLER FREE WALL LATE DIA VELOCITY (APICAL 4 CHAMBER VIEW): 0.11 M/S
TAPSE: 1.67 CM
TR MAX PG: 33.41 MMHG
TRICUSPID VALVE PEAK REGURGITATION VELOCITY: 2.89 M/S
Z-SCORE OF LEFT VENTRICULAR DIMENSION IN END DIASTOLE: -0.93
Z-SCORE OF LEFT VENTRICULAR DIMENSION IN END SYSTOLE: -0.38
Z-SCORE OF LEFT VENTRICULAR POSTERIOR WALL IN END DIASTOLE: 3.51

## 2024-04-10 PROCEDURE — 93306 TTE W/DOPPLER COMPLETE: CPT | Mod: 26 | Performed by: INTERNAL MEDICINE

## 2024-04-10 PROCEDURE — 93306 TTE W/DOPPLER COMPLETE: CPT

## 2024-04-11 LAB
B2 GLYCOPROT1 IGA SERPL IA-ACNC: <9.4 SAU
B2 GLYCOPROT1 IGG SERPL IA-ACNC: <9.4 SGU
B2 GLYCOPROT1 IGM SERPL IA-ACNC: <9.4 SMU
CARDIOLIPIN IGA SER IA-ACNC: <9.4 APL U/ML
CARDIOLIPIN IGG SER IA-ACNC: <9.4 GPL U/ML
CARDIOLIPIN IGM SER IA-ACNC: <9.4 MPL U/ML

## 2024-04-13 RX ORDER — PANTOPRAZOLE SODIUM 20 MG/1
20 TABLET, DELAYED RELEASE ORAL DAILY
Qty: 90 TABLET | Status: CANCELLED | OUTPATIENT
Start: 2024-04-13

## 2024-04-15 LAB — LABORATORY REPORT: NORMAL

## 2024-04-16 ENCOUNTER — APPOINTMENT (EMERGENCY)
Dept: RADIOLOGY | Facility: HOSPITAL | Age: 64
DRG: 315 | End: 2024-04-16
Attending: EMERGENCY MEDICINE
Payer: COMMERCIAL

## 2024-04-16 ENCOUNTER — HOSPITAL ENCOUNTER (OUTPATIENT)
Facility: HOSPITAL | Age: 64
Setting detail: OBSERVATION
Discharge: HOME | DRG: 315 | End: 2024-04-19
Attending: EMERGENCY MEDICINE | Admitting: STUDENT IN AN ORGANIZED HEALTH CARE EDUCATION/TRAINING PROGRAM
Payer: COMMERCIAL

## 2024-04-16 DIAGNOSIS — R07.9 ACUTE CHEST PAIN: Primary | ICD-10-CM

## 2024-04-16 DIAGNOSIS — I31.39 PERICARDIAL EFFUSION: ICD-10-CM

## 2024-04-16 DIAGNOSIS — I31.9 PERICARDITIS, UNSPECIFIED CHRONICITY, UNSPECIFIED TYPE: ICD-10-CM

## 2024-04-16 DIAGNOSIS — M34.9 SCLERODERMA (CMS/HCC): ICD-10-CM

## 2024-04-16 DIAGNOSIS — R91.8 LUNG MASS: ICD-10-CM

## 2024-04-16 PROBLEM — K21.9 GERD (GASTROESOPHAGEAL REFLUX DISEASE): Status: ACTIVE | Noted: 2024-04-16

## 2024-04-16 LAB
ALBUMIN SERPL-MCNC: 4 G/DL (ref 3.5–5.7)
ALP SERPL-CCNC: 68 IU/L (ref 34–125)
ALT SERPL-CCNC: 8 IU/L (ref 7–52)
ANION GAP SERPL CALC-SCNC: 8 MEQ/L (ref 3–15)
AST SERPL-CCNC: 16 IU/L (ref 13–39)
ATRIAL RATE: 138
BASOPHILS # BLD: 0.04 K/UL (ref 0.01–0.1)
BASOPHILS NFR BLD: 0.4 %
BILIRUB SERPL-MCNC: 0.3 MG/DL (ref 0.3–1.2)
BNP SERPL-MCNC: 165 PG/ML
BUN SERPL-MCNC: 23 MG/DL (ref 7–25)
CALCIUM SERPL-MCNC: 9.8 MG/DL (ref 8.6–10.3)
CHLORIDE SERPL-SCNC: 110 MEQ/L (ref 98–107)
CO2 SERPL-SCNC: 24 MEQ/L (ref 21–31)
CREAT SERPL-MCNC: 0.8 MG/DL (ref 0.6–1.2)
CRP SERPL-MCNC: 3.5 MG/L
DIFFERENTIAL METHOD BLD: ABNORMAL
EGFRCR SERPLBLD CKD-EPI 2021: >60 ML/MIN/1.73M*2
EOSINOPHIL # BLD: 0.03 K/UL (ref 0.04–0.36)
EOSINOPHIL NFR BLD: 0.3 %
ERYTHROCYTE [DISTWIDTH] IN BLOOD BY AUTOMATED COUNT: 18.8 % (ref 11.7–14.4)
ERYTHROCYTE [SEDIMENTATION RATE] IN BLOOD BY WESTERGREN METHOD: 101 MM/HR
GLUCOSE SERPL-MCNC: 86 MG/DL (ref 70–99)
HCT VFR BLD AUTO: 37.8 % (ref 35–45)
HGB BLD-MCNC: 11.5 G/DL (ref 11.8–15.7)
IMM GRANULOCYTES # BLD AUTO: 0.03 K/UL (ref 0–0.08)
IMM GRANULOCYTES NFR BLD AUTO: 0.3 %
LYMPHOCYTES # BLD: 1.29 K/UL (ref 1.2–3.5)
LYMPHOCYTES NFR BLD: 12.1 %
MCH RBC QN AUTO: 28.5 PG (ref 28–33.2)
MCHC RBC AUTO-ENTMCNC: 30.4 G/DL (ref 32.2–35.5)
MCV RBC AUTO: 93.6 FL (ref 83–98)
MONOCYTES # BLD: 0.33 K/UL (ref 0.28–0.8)
MONOCYTES NFR BLD: 3.1 %
NEUTROPHILS # BLD: 8.97 K/UL (ref 1.7–7)
NEUTS SEG NFR BLD: 83.8 %
NRBC BLD-RTO: 0 %
P AXIS: 56
PDW BLD AUTO: 12.1 FL (ref 9.4–12.3)
PLATELET # BLD AUTO: 270 K/UL (ref 150–369)
POTASSIUM SERPL-SCNC: 4.3 MEQ/L (ref 3.5–5.1)
PR INTERVAL: 128
PROT SERPL-MCNC: 8.4 G/DL (ref 6–8.2)
QRS DURATION: 70
QT INTERVAL: 290
QTC CALCULATION(BAZETT): 439
R AXIS: 31
RBC # BLD AUTO: 4.04 M/UL (ref 3.93–5.22)
SODIUM SERPL-SCNC: 142 MEQ/L (ref 136–145)
T WAVE AXIS: 113
TROPONIN I SERPL HS-MCNC: 28.8 PG/ML
TROPONIN I SERPL HS-MCNC: 30.5 PG/ML
VENTRICULAR RATE: 138
WBC # BLD AUTO: 10.69 K/UL (ref 3.8–10.5)

## 2024-04-16 PROCEDURE — 63700000 HC SELF-ADMINISTRABLE DRUG: Performed by: STUDENT IN AN ORGANIZED HEALTH CARE EDUCATION/TRAINING PROGRAM

## 2024-04-16 PROCEDURE — 80053 COMPREHEN METABOLIC PANEL: CPT | Performed by: EMERGENCY MEDICINE

## 2024-04-16 PROCEDURE — 99285 EMERGENCY DEPT VISIT HI MDM: CPT | Mod: 25

## 2024-04-16 PROCEDURE — 71045 X-RAY EXAM CHEST 1 VIEW: CPT

## 2024-04-16 PROCEDURE — 84484 ASSAY OF TROPONIN QUANT: CPT | Performed by: EMERGENCY MEDICINE

## 2024-04-16 PROCEDURE — G0378 HOSPITAL OBSERVATION PER HR: HCPCS

## 2024-04-16 PROCEDURE — 85025 COMPLETE CBC W/AUTO DIFF WBC: CPT | Performed by: EMERGENCY MEDICINE

## 2024-04-16 PROCEDURE — 93005 ELECTROCARDIOGRAM TRACING: CPT | Performed by: EMERGENCY MEDICINE

## 2024-04-16 PROCEDURE — 36415 COLL VENOUS BLD VENIPUNCTURE: CPT | Performed by: EMERGENCY MEDICINE

## 2024-04-16 PROCEDURE — 93010 ELECTROCARDIOGRAM REPORT: CPT | Performed by: INTERNAL MEDICINE

## 2024-04-16 PROCEDURE — 84484 ASSAY OF TROPONIN QUANT: CPT | Mod: 91 | Performed by: EMERGENCY MEDICINE

## 2024-04-16 PROCEDURE — 63600000 HC DRUGS/DETAIL CODE: Mod: JZ | Performed by: STUDENT IN AN ORGANIZED HEALTH CARE EDUCATION/TRAINING PROGRAM

## 2024-04-16 PROCEDURE — 63600105 HC IODINE BASED CONTRAST: Mod: JZ | Performed by: EMERGENCY MEDICINE

## 2024-04-16 PROCEDURE — 93005 ELECTROCARDIOGRAM TRACING: CPT | Performed by: NURSE PRACTITIONER

## 2024-04-16 PROCEDURE — 71275 CT ANGIOGRAPHY CHEST: CPT

## 2024-04-16 PROCEDURE — 83880 ASSAY OF NATRIURETIC PEPTIDE: CPT | Performed by: EMERGENCY MEDICINE

## 2024-04-16 PROCEDURE — 86140 C-REACTIVE PROTEIN: CPT | Performed by: EMERGENCY MEDICINE

## 2024-04-16 PROCEDURE — 99223 1ST HOSP IP/OBS HIGH 75: CPT | Performed by: STUDENT IN AN ORGANIZED HEALTH CARE EDUCATION/TRAINING PROGRAM

## 2024-04-16 PROCEDURE — 21400000 HC ROOM AND CARE CCU/INTERMEDIATE

## 2024-04-16 PROCEDURE — 93005 ELECTROCARDIOGRAM TRACING: CPT

## 2024-04-16 PROCEDURE — 85652 RBC SED RATE AUTOMATED: CPT | Performed by: EMERGENCY MEDICINE

## 2024-04-16 RX ORDER — IOPAMIDOL 755 MG/ML
100 INJECTION, SOLUTION INTRAVASCULAR
Status: COMPLETED | OUTPATIENT
Start: 2024-04-16 | End: 2024-04-16

## 2024-04-16 RX ORDER — BENZONATATE 100 MG/1
100 CAPSULE ORAL 3 TIMES DAILY PRN
COMMUNITY
End: 2024-04-19 | Stop reason: HOSPADM

## 2024-04-16 RX ORDER — SILDENAFIL CITRATE 20 MG/1
20 TABLET ORAL 3 TIMES DAILY
Status: DISCONTINUED | OUTPATIENT
Start: 2024-04-16 | End: 2024-04-19 | Stop reason: HOSPADM

## 2024-04-16 RX ORDER — DEXTROSE 40 %
15-30 GEL (GRAM) ORAL AS NEEDED
Status: DISCONTINUED | OUTPATIENT
Start: 2024-04-16 | End: 2024-04-19 | Stop reason: HOSPADM

## 2024-04-16 RX ORDER — ACETAMINOPHEN 325 MG/1
650 TABLET ORAL EVERY 6 HOURS PRN
Status: DISCONTINUED | OUTPATIENT
Start: 2024-04-16 | End: 2024-04-19 | Stop reason: HOSPADM

## 2024-04-16 RX ORDER — IBUPROFEN 200 MG
16-32 TABLET ORAL AS NEEDED
Status: DISCONTINUED | OUTPATIENT
Start: 2024-04-16 | End: 2024-04-19 | Stop reason: HOSPADM

## 2024-04-16 RX ORDER — DEXTROSE 50 % IN WATER (D50W) INTRAVENOUS SYRINGE
25 AS NEEDED
Status: DISCONTINUED | OUTPATIENT
Start: 2024-04-16 | End: 2024-04-19 | Stop reason: HOSPADM

## 2024-04-16 RX ORDER — MECLIZINE HYDROCHLORIDE 25 MG/1
25 TABLET ORAL DAILY PRN
Status: DISCONTINUED | OUTPATIENT
Start: 2024-04-17 | End: 2024-04-19 | Stop reason: HOSPADM

## 2024-04-16 RX ORDER — AMLODIPINE BESYLATE 10 MG/1
10 TABLET ORAL EVERY MORNING
Status: DISCONTINUED | OUTPATIENT
Start: 2024-04-17 | End: 2024-04-19 | Stop reason: HOSPADM

## 2024-04-16 RX ORDER — NYSTATIN 100000 [USP'U]/ML
5 SUSPENSION ORAL 4 TIMES DAILY
COMMUNITY
End: 2024-04-19 | Stop reason: HOSPADM

## 2024-04-16 RX ORDER — FUROSEMIDE 10 MG/ML
20 INJECTION INTRAMUSCULAR; INTRAVENOUS ONCE
Status: COMPLETED | OUTPATIENT
Start: 2024-04-16 | End: 2024-04-16

## 2024-04-16 RX ORDER — LEVOTHYROXINE SODIUM 100 UG/1
100 TABLET ORAL
Status: DISCONTINUED | OUTPATIENT
Start: 2024-04-17 | End: 2024-04-19 | Stop reason: HOSPADM

## 2024-04-16 RX ORDER — TOCILIZUMAB 20 MG/ML
8 INJECTION, SOLUTION, CONCENTRATE INTRAVENOUS
COMMUNITY

## 2024-04-16 RX ORDER — DOCUSATE SODIUM 100 MG/1
100 CAPSULE, LIQUID FILLED ORAL DAILY PRN
Status: DISCONTINUED | OUTPATIENT
Start: 2024-04-16 | End: 2024-04-19 | Stop reason: HOSPADM

## 2024-04-16 RX ORDER — COLCHICINE 0.6 MG/1
0.3 TABLET ORAL DAILY
Status: DISCONTINUED | OUTPATIENT
Start: 2024-04-16 | End: 2024-04-19 | Stop reason: HOSPADM

## 2024-04-16 RX ADMIN — ACETAMINOPHEN 650 MG: 325 TABLET ORAL at 23:08

## 2024-04-16 RX ADMIN — SILDENAFIL 20 MG: 20 TABLET, FILM COATED ORAL at 19:31

## 2024-04-16 RX ADMIN — IOPAMIDOL 100 ML: 755 INJECTION, SOLUTION INTRAVENOUS at 17:18

## 2024-04-16 RX ADMIN — FUROSEMIDE 20 MG: 10 INJECTION, SOLUTION INTRAMUSCULAR; INTRAVENOUS at 21:48

## 2024-04-16 ASSESSMENT — ENCOUNTER SYMPTOMS
TROUBLE SWALLOWING: 0
NAUSEA: 0
WOUND: 0
SHORTNESS OF BREATH: 0
FEVER: 0
WEAKNESS: 0
VOMITING: 0

## 2024-04-16 ASSESSMENT — COGNITIVE AND FUNCTIONAL STATUS - GENERAL
WALKING IN HOSPITAL ROOM: 3 - A LITTLE
DRESSING REGULAR UPPER BODY CLOTHING: 3 - A LITTLE
MOVING TO AND FROM BED TO CHAIR: 4 - NONE
DRESSING REGULAR LOWER BODY CLOTHING: 3 - A LITTLE
HELP NEEDED FOR PERSONAL GROOMING: 3 - A LITTLE
EATING MEALS: 4 - NONE
STANDING UP FROM CHAIR USING ARMS: 3 - A LITTLE
TOILETING: 3 - A LITTLE
CLIMB 3 TO 5 STEPS WITH RAILING: 3 - A LITTLE
HELP NEEDED FOR BATHING: 3 - A LITTLE

## 2024-04-16 NOTE — ED PROVIDER NOTES
Emergency Medicine Note  HPI   HISTORY OF PRESENT ILLNESS     Pt is a 63 y.o. female with a past medical history of cutaneous systemic scleroderma (dx 1998), pulmonary hypertension (WHO Group 1, on mecitentan and sildenafil), ILD, Raynaud's disease, PE (3/2023, not on AC due to epistaxis and hemoptysis), GERD and HTN who was admitted in early to middle March for chest pain.  PE workup was negative and ultimately treated for pericarditis and pericardial effusion.  She reports finishing a course of prednisone and colchicine.  States 3 to 4 days ago her symptoms returned with steady substernal chest pain and shortness of breath.  No fever or cough.  No exacerbating factors.      History provided by:  Patient and caregiver  Chest Pain  Pain location:  Substernal area  Associated symptoms: no dysphagia, no fever, no nausea, no shortness of breath, no vomiting and no weakness          Patient History   PAST HISTORY     Reviewed from Nursing Triage:       Past Medical History:   Diagnosis Date    De Quervain's tenosynovitis     Essential (primary) hypertension     Follicular carcinoma of thyroid (CMS/HCC)     Gastro-esophageal reflux     Hand ulceration (CMS/HCC)     Hyperlipidemia, unspecified     Interstitial lung disease (CMS/HCC)     Leg ulcer (CMS/HCC)     Lung nodule     Lupus (CMS/HCC)     Osteomyelitis of hand (CMS/HCC)     Osteoporosis     Pulmonary hypertension (CMS/HCC)     Raynaud's disease     Severe    Reflux esophagitis     Scleroderma (CMS/HCC)     Screening mammogram for breast cancer 05/04/2021    BI-RADS category: 1 - Negative (care everywhere @ [place])       Past Surgical History:   Procedure Laterality Date    CARDIAC CATHETERIZATION      COLONOSCOPY      HERNIA REPAIR         Family History   Problem Relation Age of Onset    Heart disease Biological Brother         stent    Breast cancer Niece     Cervical cancer Neg Hx     Uterine cancer Neg Hx     Colon cancer Neg Hx     Ovarian cancer Neg Hx         Social History     Tobacco Use    Smoking status: Former     Types: Cigarettes     Quit date: 9/15/2005     Years since quittin.5    Smokeless tobacco: Never    Tobacco comments:     Would smoke 1/2 of 1/2 PPD, quit in    Vaping Use    Vaping Use: Never used   Substance Use Topics    Alcohol use: Never    Drug use: Never         Review of Systems   REVIEW OF SYSTEMS     Review of Systems   Constitutional:  Negative for fever.   HENT:  Negative for trouble swallowing.    Respiratory:  Negative for shortness of breath.    Cardiovascular:  Positive for chest pain. Negative for leg swelling.   Gastrointestinal:  Negative for nausea and vomiting.   Skin:  Negative for wound.   Neurological:  Negative for weakness.   Psychiatric/Behavioral:  Negative for suicidal ideas.          VITALS     ED Vitals      Date/Time Temp Pulse Resp BP SpO2 [facility]   24 1346 36.6 °C (97.8 °F) 149 25 162/91 96 % JCT                         Physical Exam   PHYSICAL EXAM     Physical Exam  Constitutional:       Comments: Chronically ill-appearing   HENT:      Head: Normocephalic.      Mouth/Throat:      Pharynx: Oropharynx is clear.   Cardiovascular:      Rate and Rhythm: Tachycardia present.      Pulses: Normal pulses.   Pulmonary:      Effort: No respiratory distress.   Abdominal:      Tenderness: There is no abdominal tenderness.   Musculoskeletal:      Cervical back: Rigidity present.      Right lower leg: No edema.      Left lower leg: No edema.   Skin:     General: Skin is warm and dry.   Neurological:      General: No focal deficit present.      Mental Status: She is alert and oriented to person, place, and time. Mental status is at baseline.   Psychiatric:         Behavior: Behavior normal.           PROCEDURES     Procedures     DATA     Results       None            Imaging Results              X-RAY CHEST 1 VIEW (In process)                     ECG 12 lead          Scoring tools                                  ED  Course & MDM   MDM / ED COURSE / CLINICAL IMPRESSION / DISPO     Medical Decision Making  Patient presents with chest pain shortness of breath.  She is history of ILD, pericarditis, pulmonary hypertension and pericarditis.  She is tachycardic but no respiratory distress.  Differential is broad includes recurrent pericarditis, PE, pneumonia, heart failure, exacerbation of ILD, and others  She is at the end of her steroid taper but still states she is taking the colchicine.  Discussed with cardiology.  Will check CTA.    Problems Addressed:  Acute chest pain: acute illness or injury  Pericardial effusion: acute illness or injury    Amount and/or Complexity of Data Reviewed  Independent Historian: caregiver  External Data Reviewed: notes.     Details: Discharge summary March 16  Outpatient chest x-ray and echocardiogram revealing small to moderate pericardial effusion from 4/9  Labs: ordered. Decision-making details documented in ED Course.  Radiology: ordered and independent interpretation performed. Decision-making details documented in ED Course.  ECG/medicine tests: ordered and independent interpretation performed. Decision-making details documented in ED Course.  Discussion of management or test interpretation with external provider(s): Cardiology  hospitalst    Risk  Prescription drug management.  Decision regarding hospitalization.        ED Course as of 04/16/24 1739 Tue Apr 16, 2024   1429 ECG  Sinus tachycardia 138 bpm  Normal intervals  Nonspecific ST changes  No STEMI  Possible electrical aleterans   [DP]   1645 Sed Rate(!): 101  Elevated sed rate [DP]   1650 X-RAY CHEST 1 VIEW  Cardiomegaly [DP]   1659 Dw Dr Arvizu [DP]   1738 DW hospitalist [DP]      ED Course User Index  [DP] Maninder Warren MD     Clinical Impression      None                 Maninder Warren MD  04/16/24 1741

## 2024-04-16 NOTE — ASSESSMENT & PLAN NOTE
-63-year-old female with above-mentioned medical problems who presented initially for evaluation of worsening chest pain, likely in the setting of large pericardial effusion  -Telemetry without arrhythmias, HST 28->30, CRP WNL,    -Per chart review, had coronary CT last month (03/2024) which revealed two-vessel coronary artery disease as above that is moderate in severity. ECHO earlier this month revealing EF: 60%. No regional wall motion abnormalities .   -Cardiology evaluated, believe etiology of her pain is inflammatory in nature 2/2 to her auto-immune disease, recommending resuming steroids   -Currently patient is refusing plavix/statin, will discuss with patient further   -Started prednisone 20mg qD on 4/17 with plans for slow taper  -Rheumatology consulted, plan for dose of IV tocilizumab today, appreciate assistance with this case

## 2024-04-16 NOTE — PROGRESS NOTES
"Arielle GUAJARDO [facility]   [id number]    Your doctor has referred you for a CT examination that requires the injection of an iodinated contrast material into your bloodstream. This iodinated contrast material, sometimes referred to as \"x-ray dye\" allows for better interpretation of the x-ray films or CT images and results in a more accurate interpretation of the examination.     Without the use of iodinated contrast (x-ray dye), the examination may be less informative and result in a suboptimal examination, and possibly a delay in diagnosis and, if needed, treatment.     The iodinated contrast material is given through a small needle or catheter placed into a vein, usually on the inside of the elbow, on the back of hand, or in a vein in the foot or lower leg.    The most common, though still rare, potential reaction to an intravenous contrast injection is an allergic-like reaction. Most allergic-like reactions are mild, though a small subset of people can have more severe reactions. Mild reactions include mild / scattered hives, itching, scratchy throat, sneezing and nasal congestion. More severe reactions include facial swelling, severe difficulty breathing, wheezing and anaphylactic shock. Those with a prior history allergic-like reaction to the same class of contrast media (such as CT contrast or MRI contrast) are at the highest risk for an allergic reaction.     If you believe you had an allergic reaction to contrast in the past, please let our staff know. We can determine if this increases your risk for a future reaction and provide steps to decrease the risk. This may delay your examination, but it decreases the risk of having a new and possibly more severe reaction to the contrast injection.    People with a history of prior allergic reactions to other substances (such as unrelated medications and food) and patients with a history of asthma have slightly increased risk for an allergic reaction from contrast " material when compared with the general population, but do not require to be pretreated prior to a contrast injection.    You should notify the physician, nurse or technologist if you have ever had any of these conditions or if you have any questions.

## 2024-04-16 NOTE — PROGRESS NOTES
Spoke with patient and confirmed with medication list from [facility] and/or patient's own pharmacy to complete the medication reconciliation.     Prior to admission medication list:    Current Outpatient Medications:     amLODIPine (NORVASC) 5 mg tablet, Take 10 mg by mouth every morning.    benzonatate (TESSALON) 100 mg capsule, Take 100 mg by mouth 3 (three) times a day as needed for cough.    biotin 1 mg tablet, Take 1,000 mcg by mouth daily.    cholecalciferol, vitamin D3, 1,000 unit (25 mcg) tablet, Take 1,000 Units by mouth daily.    colchicine (COLCRYS) 0.6 mg tablet, Take 0.5 tablets (0.3 mg total) by mouth daily.    cyanocobalamin, vitamin B-12, 1,000 mcg capsule, Take 1,000 mcg by mouth daily.    docusate sodium (COLACE) 100 mg capsule, Take 100 mg by mouth daily as needed for constipation.    levothyroxine (SYNTHROID) 100 mcg tablet, Take 100 mcg by mouth daily.    macitentan (OPSUMIT) 10 mg tablet tablet, Take 1 tablet (10 mg total) by mouth daily.    meclizine (ANTIVERT) 25 mg tablet, Take 25 mg by mouth daily as needed.    mupirocin (BACTROBAN) 2 % ointment, Apply 1 application. topically daily. Behind ears    nystatin (MYCOSTATIN) 100,000 unit/mL suspension, Swish and swallow 5 mL 4 (four) times a day.    sildenafiL, pulm.hypertension, (REVATIO) 20 mg tablet, Take 1 tablet (20 mg total) by mouth 3 (three) times a day.    tocilizumab (ACTEMRA) 200 mg/10 mL (20 mg/mL) solution, Infuse 8 mg/kg into a venous catheter every 28 (twentyeight) days.    Comments about home medications:  -Patient is taking Synthroid BRAND ONLY.  -Patient is not taking pantoprazole.  -Patient finished course of prednisone 5mg, took last dose this morning.  -Patient states she is due to start Actemra 200mg/10ml on 4/29/24, and every 4 weeks after.    Compliance:   -amlodipine 5mg last filled 12/21/23 30 day supply.  -Recent fills on all other medications.

## 2024-04-16 NOTE — ASSESSMENT & PLAN NOTE
-Chronic history of Raynaud's phenomenon/scleroderma  -Continue tocilizumab on discharge  -Rheumatology consulted as noted above

## 2024-04-16 NOTE — ASSESSMENT & PLAN NOTE
-CT scan showed lung mass 16 mm in size in the left lower lobe, she does have history of tobacco abuse in the past  -Pulmonology consulted, will need outpatient PET CT, will order at discharge

## 2024-04-16 NOTE — H&P
Hospital Medicine Service -  History & Physical        CHIEF COMPLAINT   Chest pain/pericardial effusion  Lung mass    Chronic problem  Pulm hypertension  Interstitial lung disease  Raynaud's phenomena/scleroderma  Hypertension  Hypothyroidism     HISTORY OF PRESENT ILLNESS      Arielle Felix is a 63 female with past medical history of hypertension, hypothyroidism, pulmonary hypertension, scleroderma, interstitial lung disease, Raynaud's phenomena who presented initially for evaluation of worsening chest pain    She was recently admitted and discharged in March 2024 after she was found to have pericardial effusion, was sent on a course of steroids/colchicine, she reports being compliant with the medication    During patient encounter she stated, that she has been having off-and-on pain, but today was really worse to the point that she was feeling lightheaded, and could feel her heart racing, so decided to come to the ER for further evaluation, currently reporting some dyspnea on exertion, otherwise symptoms better controlled, review of system negative    On presentation to the ER she was noted to be hypertensive, tachycardic  Lab/imaging finding/EKG personally reviewed by me  Labs showed mild elevated troponin, elevated BNP, leukocytosis  CT angio of the chest showed no evidence of PE, did show a large pericardial effusion, emphysema, pulmonary hypertension, 16 mm left lobe lung mass  Chest x-ray showed cardiomegaly/pericardial effusion, vascular prominence  EKG showed sinus tachycardia, normal axis, nonspecific ST changes    PAST MEDICAL AND SURGICAL HISTORY      Past Medical History:   Diagnosis Date    De Quervain's tenosynovitis     Essential (primary) hypertension     Follicular carcinoma of thyroid (CMS/HCC)     Gastro-esophageal reflux     Hand ulceration (CMS/HCC)     Hyperlipidemia, unspecified     Interstitial lung disease (CMS/HCC)     Leg ulcer (CMS/HCC)     Lung nodule     Lupus (CMS/HCC)      Osteomyelitis of hand (CMS/[facility])     Osteoporosis     Pulmonary hypertension (CMS/[facility])     Raynaud's disease     Severe    Reflux esophagitis     Scleroderma (CMS/[facility])     Screening mammogram for breast cancer 05/04/2021    BI-RADS category: 1 - Negative (care everywhere @ [place])       Past Surgical History:   Procedure Laterality Date    CARDIAC CATHETERIZATION      COLONOSCOPY      HERNIA REPAIR         PCP: Sara Simmons MD    MEDICATIONS      Prior to Admission medications    Medication Sig Start Date End Date Taking? Authorizing Provider   amLODIPine (NORVASC) 5 mg tablet Take 10 mg by mouth every morning.   Yes Kera Hidalgo MD   benzonatate (TESSALON) 100 mg capsule Take 100 mg by mouth 3 (three) times a day as needed for cough.   Yes Kera Hidalgo MD   biotin 1 mg tablet Take 1,000 mcg by mouth daily.   Yes Romel Hidalgo MD   cholecalciferol, vitamin D3, 1,000 unit (25 mcg) tablet Take 1,000 Units by mouth daily.   Yes Romel Hidalgo MD   colchicine (COLCRYS) 0.6 mg tablet Take 0.5 tablets (0.3 mg total) by mouth daily. 3/10/24 6/8/24 Yes Corinne Ortez DO   cyanocobalamin, vitamin B-12, 1,000 mcg capsule Take 1,000 mcg by mouth daily. 2/14/20  Yes Romel Hidalgo MD   docusate sodium (COLACE) 100 mg capsule Take 100 mg by mouth daily as needed for constipation.   Yes Romel Hidalgo MD   levothyroxine (SYNTHROID) 100 mcg tablet Take 100 mcg by mouth daily. 7/6/20  Yes Romel Hidalgo MD   macitentan (OPSUMIT) 10 mg tablet tablet Take 1 tablet (10 mg total) by mouth daily. 8/29/23  Yes Radha Lal CRNP   meclizine (ANTIVERT) 25 mg tablet Take 25 mg by mouth daily as needed.   Yes Romel Hidalgo MD   mupirocin (BACTROBAN) 2 % ointment Apply 1 application. topically daily. Behind ears   Yes Kera Hidalgo MD   nystatin (MYCOSTATIN) 100,000 unit/mL suspension Swish and swallow 5 mL 4 (four) times a day.   Yes Kera Hidalgo  MD Romel   sildenafiL, pulm.hypertension, (REVATIO) 20 mg tablet Take 1 tablet (20 mg total) by mouth 3 (three) times a day. 24  Yes Timothy Arvizu,    tocilizumab (ACTEMRA) 200 mg/10 mL (20 mg/mL) solution Infuse 8 mg/kg into a venous catheter every 28 (twentyeight) days.    Provider, Kera Urena MD   amLODIPine (NORVASC) 5 mg tablet Take 5-10 mg by mouth nightly as needed (for high BP or Raynauds phenomenon of the fingers).  24  ProviderKera MD   fluticasone-umeclidinium-vilanterol (TRELEGY ELLIPTA) 100-62.5-25 mcg blister with device powder for inhalation Inhale 1 puff daily.  24  ProviderKera MD   pantoprazole (PROTONIX) 20 mg EC tablet Take 1 tablet (20 mg total) by mouth daily. 3/11/24 4/16/24  Octavio Giordano MD   predniSONE (DELTASONE) 5 mg tablet Take 4 tablets (20 mg total) by mouth daily for 4 days, THEN 2 tablets (10 mg total) daily for 14 days, THEN 1 tablet (5 mg total) daily for 14 days. 3/16/24 4/16/24  Octavio Giordano MD       ALLERGIES      Aspirin, Ibuprofen, Iodine, Iodine and iodide containing products, Penicillins, Sulfa (sulfonamide antibiotics), Clindamycin, Hydrochlorothiazide, Oxycodone, Sulfamethoxazole-trimethoprim, and Latex    FAMILY HISTORY      Family History   Problem Relation Age of Onset    Heart disease Biological Brother         stent    Breast cancer Niece     Cervical cancer Neg Hx     Uterine cancer Neg Hx     Colon cancer Neg Hx     Ovarian cancer Neg Hx        SOCIAL HISTORY      Social History     Socioeconomic History    Marital status:    Tobacco Use    Smoking status: Former     Types: Cigarettes     Quit date: 9/15/2005     Years since quittin.5    Smokeless tobacco: Never    Tobacco comments:     Would smoke 1/2 of 1/2 PPD, quit in    Vaping Use    Vaping Use: Never used   Substance and Sexual Activity    Alcohol use: Never    Drug use: Never    Sexual activity: Not Currently     Birth control/protection:  Post-menopausal     Social Determinants of Health     Food Insecurity: No Food Insecurity (4/16/2024)    Hunger Vital Sign     Worried About Running Out of Food in the Last Year: Never true     Ran Out of Food in the Last Year: Never true   Transportation Needs: No Transportation Needs (3/4/2024)    PRAPARE - Transportation     Lack of Transportation (Medical): No     Lack of Transportation (Non-Medical): No   Housing Stability: Low Risk  (3/4/2024)    Housing Stability Vital Sign     Unable to Pay for Housing in the Last Year: No     Number of Places Lived in the Last Year: 1     Unstable Housing in the Last Year: No       REVIEW OF SYSTEMS      All other systems reviewed and negative except as noted in HPI    PHYSICAL EXAMINATION      Temp:  [36.6 °C (97.8 °F)] 36.6 °C (97.8 °F)  Heart Rate:  [] 96  Resp:  [20-25] 20  BP: (162-182)/(76-91) 165/77  Body mass index is 16.95 kg/m².    Physical Exam  General: no apparent distress, appears stated age  HNT: moist mucous membranes, normocephalic  Eyes: sclera anicteric, EOMI  Thorax: clear to auscultation bilaterally, equal expansion  Cardiovascular: no jugular venous distension, no murmurs, pulse regular rate and rhythm  Abdomen: soft, non-tender, non-distended, bowel sounds noted  Extremities: no cyanosis, no pedal edema bilaterally  Neurologic: alert/awake/oriented, grossly no abnormalities    LABS / IMAGING / EKG        See HPI    ASSESSMENT AND PLAN           * Acute chest pain  Assessment & Plan  63-year-old female with above-mentioned medical problems who presented initially for evaluation of worsening chest pain, likely in the setting of large pericardial effusion, ER reach out to cardiology, recommended monitoring for now, will reevaluate in a.m., patient was counseled in detail to reach out to me regardingwarning signs including worsening chest pain/shortness of breath, so cardiology can be informed right away, will closely monitor hemodynamics, will do a  trial of Lasix dose, pain control, n.p.o. after midnight    Lung mass  Assessment & Plan  CT scan showed lung mass 16 mm in size in the left lower lobe, she does have history of tobacco abuse in the past, will consult pulmonology for further recommendations, will eventually likely require bronchoscopy    Hypothyroid  Assessment & Plan  Chronic history of hypothyroidism, continue levothyroxine    Scleroderma (CMS/HCC)  Assessment & Plan  Chronic history of Raynaud's phenomenon/scleroderma, continue tocilizumab on discharge    Interstitial lung disease (CMS/HCC)  Assessment & Plan  As above    Pulmonary hypertension (CMS/HCC)  Assessment & Plan  Chronic history of pulmonary hypertension, resume sildenafil/macitentan, would consult pulm given lung mass    Essential (primary) hypertension  Assessment & Plan  Chronic history of hypertension, resume home medication, adjust as needed         VTE Assessment: Padua    VTE Prophylaxis: SCDs    Code Status: Full Code    Estimated Discharge Date: 4/19/2024  Disposition Planning: Cardio eval in am     Capo Estevez MD  4/16/2024

## 2024-04-17 LAB
ANION GAP SERPL CALC-SCNC: 12 MEQ/L (ref 3–15)
ATRIAL RATE: 89
BASOPHILS # BLD: 0.05 K/UL (ref 0.01–0.1)
BASOPHILS NFR BLD: 0.6 %
BUN SERPL-MCNC: 24 MG/DL (ref 7–25)
CALCIUM SERPL-MCNC: 9.3 MG/DL (ref 8.6–10.3)
CHLORIDE SERPL-SCNC: 109 MEQ/L (ref 98–107)
CO2 SERPL-SCNC: 20 MEQ/L (ref 21–31)
CREAT SERPL-MCNC: 1 MG/DL (ref 0.6–1.2)
DIFFERENTIAL METHOD BLD: ABNORMAL
EGFRCR SERPLBLD CKD-EPI 2021: >60 ML/MIN/1.73M*2
EOSINOPHIL # BLD: 0.13 K/UL (ref 0.04–0.36)
EOSINOPHIL NFR BLD: 1.5 %
ERYTHROCYTE [DISTWIDTH] IN BLOOD BY AUTOMATED COUNT: 18.9 % (ref 11.7–14.4)
FERRITIN SERPL-MCNC: 9 NG/ML (ref 11–250)
FOLATE SERPL-MCNC: 15.6 NG/ML
GLUCOSE SERPL-MCNC: 72 MG/DL (ref 70–99)
HCT VFR BLD AUTO: 34.3 % (ref 35–45)
HGB BLD-MCNC: 10.3 G/DL (ref 11.8–15.7)
IMM GRANULOCYTES # BLD AUTO: 0.03 K/UL (ref 0–0.08)
IMM GRANULOCYTES NFR BLD AUTO: 0.3 %
IRON SATN MFR SERPL: 10 % (ref 15–45)
IRON SERPL-MCNC: 34 UG/DL (ref 35–150)
LYMPHOCYTES # BLD: 2.14 K/UL (ref 1.2–3.5)
LYMPHOCYTES NFR BLD: 24.5 %
MAGNESIUM SERPL-MCNC: 1.8 MG/DL (ref 1.8–2.5)
MCH RBC QN AUTO: 27.6 PG (ref 28–33.2)
MCHC RBC AUTO-ENTMCNC: 30 G/DL (ref 32.2–35.5)
MCV RBC AUTO: 92 FL (ref 83–98)
MONOCYTES # BLD: 0.61 K/UL (ref 0.28–0.8)
MONOCYTES NFR BLD: 7 %
NEUTROPHILS # BLD: 5.77 K/UL (ref 1.7–7)
NEUTS SEG NFR BLD: 66.1 %
NRBC BLD-RTO: 0 %
P AXIS: 71
PDW BLD AUTO: 12.3 FL (ref 9.4–12.3)
PHOSPHATE SERPL-MCNC: 3.8 MG/DL (ref 2.4–4.7)
PLATELET # BLD AUTO: 235 K/UL (ref 150–369)
POTASSIUM SERPL-SCNC: 4.1 MEQ/L (ref 3.5–5.1)
PR INTERVAL: 130
QRS DURATION: 74
QT INTERVAL: 374
QTC CALCULATION(BAZETT): 455
R AXIS: 63
RBC # BLD AUTO: 3.73 M/UL (ref 3.93–5.22)
SODIUM SERPL-SCNC: 141 MEQ/L (ref 136–145)
T WAVE AXIS: 95
TIBC SERPL-MCNC: 326 UG/DL (ref 270–460)
UIBC SERPL-MCNC: 292 UG/DL (ref 180–360)
VENTRICULAR RATE: 89
VIT B12 SERPL-MCNC: 829 PG/ML (ref 180–914)
WBC # BLD AUTO: 8.73 K/UL (ref 3.8–10.5)

## 2024-04-17 PROCEDURE — 93010 ELECTROCARDIOGRAM REPORT: CPT | Performed by: INTERNAL MEDICINE

## 2024-04-17 PROCEDURE — 84100 ASSAY OF PHOSPHORUS: CPT | Performed by: STUDENT IN AN ORGANIZED HEALTH CARE EDUCATION/TRAINING PROGRAM

## 2024-04-17 PROCEDURE — 63700000 HC SELF-ADMINISTRABLE DRUG: Performed by: INTERNAL MEDICINE

## 2024-04-17 PROCEDURE — 83735 ASSAY OF MAGNESIUM: CPT | Performed by: STUDENT IN AN ORGANIZED HEALTH CARE EDUCATION/TRAINING PROGRAM

## 2024-04-17 PROCEDURE — 63600000 HC DRUGS/DETAIL CODE: Mod: JZ | Performed by: HOSPITALIST

## 2024-04-17 PROCEDURE — 97535 SELF CARE MNGMENT TRAINING: CPT | Mod: GO

## 2024-04-17 PROCEDURE — 83540 ASSAY OF IRON: CPT | Performed by: INTERNAL MEDICINE

## 2024-04-17 PROCEDURE — 21400000 HC ROOM AND CARE CCU/INTERMEDIATE

## 2024-04-17 PROCEDURE — 63700000 HC SELF-ADMINISTRABLE DRUG: Performed by: STUDENT IN AN ORGANIZED HEALTH CARE EDUCATION/TRAINING PROGRAM

## 2024-04-17 PROCEDURE — G0378 HOSPITAL OBSERVATION PER HR: HCPCS

## 2024-04-17 PROCEDURE — 82728 ASSAY OF FERRITIN: CPT | Performed by: INTERNAL MEDICINE

## 2024-04-17 PROCEDURE — 82746 ASSAY OF FOLIC ACID SERUM: CPT | Performed by: INTERNAL MEDICINE

## 2024-04-17 PROCEDURE — 97166 OT EVAL MOD COMPLEX 45 MIN: CPT | Mod: GO

## 2024-04-17 PROCEDURE — 80048 BASIC METABOLIC PNL TOTAL CA: CPT | Performed by: STUDENT IN AN ORGANIZED HEALTH CARE EDUCATION/TRAINING PROGRAM

## 2024-04-17 PROCEDURE — 83550 IRON BINDING TEST: CPT | Performed by: INTERNAL MEDICINE

## 2024-04-17 PROCEDURE — 99233 SBSQ HOSP IP/OBS HIGH 50: CPT | Performed by: INTERNAL MEDICINE

## 2024-04-17 PROCEDURE — 99223 1ST HOSP IP/OBS HIGH 75: CPT | Performed by: INTERNAL MEDICINE

## 2024-04-17 PROCEDURE — 85025 COMPLETE CBC W/AUTO DIFF WBC: CPT | Performed by: STUDENT IN AN ORGANIZED HEALTH CARE EDUCATION/TRAINING PROGRAM

## 2024-04-17 PROCEDURE — 36415 COLL VENOUS BLD VENIPUNCTURE: CPT | Performed by: STUDENT IN AN ORGANIZED HEALTH CARE EDUCATION/TRAINING PROGRAM

## 2024-04-17 PROCEDURE — 82607 VITAMIN B-12: CPT | Performed by: INTERNAL MEDICINE

## 2024-04-17 RX ORDER — PANTOPRAZOLE SODIUM 40 MG/1
40 TABLET, DELAYED RELEASE ORAL DAILY
Status: DISCONTINUED | OUTPATIENT
Start: 2024-04-17 | End: 2024-04-19 | Stop reason: HOSPADM

## 2024-04-17 RX ORDER — ONDANSETRON 4 MG/1
4 TABLET, ORALLY DISINTEGRATING ORAL EVERY 8 HOURS PRN
Status: DISCONTINUED | OUTPATIENT
Start: 2024-04-17 | End: 2024-04-19 | Stop reason: HOSPADM

## 2024-04-17 RX ORDER — MORPHINE SULFATE 2 MG/ML
2 INJECTION, SOLUTION INTRAMUSCULAR; INTRAVENOUS ONCE
Status: COMPLETED | OUTPATIENT
Start: 2024-04-17 | End: 2024-04-17

## 2024-04-17 RX ORDER — ONDANSETRON HYDROCHLORIDE 2 MG/ML
4 INJECTION, SOLUTION INTRAVENOUS EVERY 8 HOURS PRN
Status: DISCONTINUED | OUTPATIENT
Start: 2024-04-17 | End: 2024-04-19 | Stop reason: HOSPADM

## 2024-04-17 RX ORDER — PREDNISONE 20 MG/1
20 TABLET ORAL DAILY
Status: DISCONTINUED | OUTPATIENT
Start: 2024-04-17 | End: 2024-04-19 | Stop reason: HOSPADM

## 2024-04-17 RX ADMIN — LEVOTHYROXINE SODIUM 100 MCG: 0.1 TABLET ORAL at 05:56

## 2024-04-17 RX ADMIN — MORPHINE SULFATE 2 MG: 2 INJECTION, SOLUTION INTRAMUSCULAR; INTRAVENOUS at 00:52

## 2024-04-17 RX ADMIN — PANTOPRAZOLE SODIUM 40 MG: 40 TABLET, DELAYED RELEASE ORAL at 14:25

## 2024-04-17 RX ADMIN — SILDENAFIL 20 MG: 20 TABLET, FILM COATED ORAL at 08:02

## 2024-04-17 RX ADMIN — SILDENAFIL 20 MG: 20 TABLET, FILM COATED ORAL at 20:19

## 2024-04-17 RX ADMIN — COLCHICINE 0.3 MG: 0.6 TABLET ORAL at 08:02

## 2024-04-17 RX ADMIN — PREDNISONE 20 MG: 20 TABLET ORAL at 14:25

## 2024-04-17 RX ADMIN — SILDENAFIL 20 MG: 20 TABLET, FILM COATED ORAL at 14:25

## 2024-04-17 RX ADMIN — ACETAMINOPHEN 650 MG: 325 TABLET ORAL at 05:56

## 2024-04-17 RX ADMIN — AMLODIPINE BESYLATE 10 MG: 10 TABLET ORAL at 08:02

## 2024-04-17 RX ADMIN — ACETAMINOPHEN 650 MG: 325 TABLET ORAL at 20:23

## 2024-04-17 ASSESSMENT — COGNITIVE AND FUNCTIONAL STATUS - GENERAL
MOVING TO AND FROM BED TO CHAIR: 3 - A LITTLE
AFFECT: WFL
DRESSING REGULAR LOWER BODY CLOTHING: 2 - A LOT
WALKING IN HOSPITAL ROOM: 3 - A LITTLE
HELP NEEDED FOR PERSONAL GROOMING: 3 - A LITTLE
CLIMB 3 TO 5 STEPS WITH RAILING: 2 - A LOT
EATING MEALS: 4 - NONE
DRESSING REGULAR UPPER BODY CLOTHING: 3 - A LITTLE
HELP NEEDED FOR BATHING: 2 - A LOT
TOILETING: 3 - A LITTLE
STANDING UP FROM CHAIR USING ARMS: 3 - A LITTLE

## 2024-04-17 NOTE — PLAN OF CARE
Care Coordination Admission Assessment Note    General Information:  Readmission Within the last 30 days: no previous admission in last 30 days  Does patient have a :    Patient-Specific Goals (include timeframe): Return home    Living Arrangements:  Arrived From: home  Current Living Arrangements: home  People in Home: alone  Home Accessibility: stairs to enter home (Group), stairs within home (Group)  Living Arrangement Comments: Pt resides alone in a 2  with 5 LARRY.    Functional Status Prior to Admission:   Assistive Device/Animal Currently Used at Home: walker, standard, stair glide  Functional Status Comments: Pt states to require assistance at home for ADLs. Pt has home health aides for assistance. Pt states she ambulates slowly with a walker.  IADL Comments:       Supports and Services:  Current Outpatient/Agency/Support Group: homecare agency (24/7 home health aides through [facility])  Type of Current Home Care Services: home health aide  History of home care episode or rehab stay: Pt reports use of [facility] in the past. Denies SNF history.    Discharge Needs Assessment:   Concerns to be Addressed: discharge planning, care coordination/care conferences  Current Discharge Risk: lives alone  Anticipated Changes Related to Illness: none    Patient/Family Anticipated Discharge Plan:  Patient/Family Anticipates Transition To: home with help/services  Patient/Family Anticipated Services at Transition: home health care    Connection to Community  Patient declined offered resources.    Patient Choice:   Offered/Gave Vendor List: no  Patient's Choice of Community Agency(s):         Anticipated Discharge Plan:  Met with patient. Provided education and contact information for Care Coordination services.: yes  Anticipated Discharge Disposition: home with assistance, home with home health  Type of Home Care Services: home health aide, home PT, home OT, nursing    Transportation Needs  (SDOH):  Transportation Concerns:    Transportation Anticipated: family or friend will provide  Is Out of Hospital DNR needed at discharge?: no    Concerns - comments: None at this time

## 2024-04-17 NOTE — CONSULTS
"Nutrition Assessment    Recommendations  1: Recommend regular diet. No pork per pt preference  2: Ensure Plus TID (vary)  3: Daily MVI + minerals per wound care protocol   4: Please obtain updated to confirm CBW -- standing scale preferred          Clinical Course: Patient is a 63 y.o. female who was admitted on 4/16/2024 with a diagnosis of Pericardial effusion [I31.39]  Acute chest pain [R07.9]  Pericarditis, unspecified chronicity, unspecified type [I31.9].     Past Medical History:   Diagnosis Date    De Quervain's tenosynovitis     Essential (primary) hypertension     Follicular carcinoma of thyroid (CMS/HCC)     Gastro-esophageal reflux     Hand ulceration (CMS/HCC)     Hyperlipidemia, unspecified     Interstitial lung disease (CMS/HCC)     Leg ulcer (CMS/HCC)     Lung nodule     Lupus (CMS/HCC)     Osteomyelitis of hand (CMS/HCC)     Osteoporosis     Pulmonary hypertension (CMS/HCC)     Raynaud's disease     Severe    Reflux esophagitis     Scleroderma (CMS/HCC)     Screening mammogram for breast cancer 05/04/2021    BI-RADS category: 1 - Negative (care everywhere @ [place])     Past Surgical History:   Procedure Laterality Date    CARDIAC CATHETERIZATION      COLONOSCOPY      HERNIA REPAIR         Reason for Assessment  Reason For Assessment: identified at risk by screening criteria     [facility] Nutrition Screen Tool  Has patient lost weight without trying?: 3-->Yes, 24-33 lbs  Has patient been eating poorly due to decreased appetite?: 0-->No  [facility] Nutrition Screen Score: 3     Nutrition/Diet History  Food Allergies: no known food allergies     Physical Findings  Last Bowel Movement: 04/16/24  Skin: pressure injury (L ankle stage II, L toe unstageable, R ankle PI stage II, R toe wound)       Nutrition Order  Nutrition Order: meets nutritional requirements  Nutrition Order Comments: Regular  Current TF/TPN Regimen: No     Anthropometrics  Height: 167.6 cm (5' 6\")           Current Weight  Weight Method: Bed " scale  Weight: 53.5 kg (118 lb)     Ideal Body Weight (IBW)  Ideal Body Weight (IBW) (kg): 59.58  % Ideal Body Weight: 82.23            Body Mass Index (BMI)  BMI (Calculated): 19.1                       Labs/Procedures/Meds  Lab Results Reviewed: reviewed, pertinent     Lab Results   Component Value Date    GLUCOSE 72 04/17/2024    CALCIUM 9.3 04/17/2024     04/17/2024    K 4.1 04/17/2024    CO2 20 (L) 04/17/2024     (H) 04/17/2024    BUN 24 04/17/2024    CREATININE 1.0 04/17/2024             Medications  Pertinent Medications Reviewed: reviewed, pertinent   Scheduled Meds:   amLODIPine  10 mg oral q AM    colchicine  0.3 mg oral Daily    levothyroxine  100 mcg oral Daily (6:30a)    macitentan  10 mg oral Daily    sildenafiL (pulm.hypertension)  20 mg oral TID       Estimated/Assessed Needs  Additional Documentation: Calorie Requirements (Group), Protein Requirements (Group)     Calorie Requirements  Estimated kCal Needs: Actual Body Weight  Estimated Calorie Need Method: kcal/kg  Calorie/kg Recommended: 30-35  Calorie Recommendations: 9846-2400 kcal                   Protein Requirements  Recommended Dosing Weight (Estimated Protein Needs): Actual Body Weight  Est Protein Requirement Amount (gms/kg): 1.5-->1.5 gm protein  Protein Recommendations: 80 g                         Fluid requirements: 1600 mL (30 ml/kg)     Dosing weight: 118# (CBW, bed scale)                                                            NFPE: appears frail, thin. Exam limited- resting in bed under covers.     Clinical comments:    Nutrition risk screening referral acknowledged, pt seen for MST score 3. Known to [facility] from prior admissions, last seen 3/15. Pt reports good appetite PTA. Typically consumes 3 meals/day, + Ensure Plus TID in an effort to gain weight. Has aides at home that assist w/ meal preparation. NPO at time of visit- endorsing hunger. Seen by MD and diet advanced shortly after RD visit. Weights reviewed,  difficult to assess trends. Weights generally trending down since 2022, recent weights fluctuating ?accuracy. Pt measured 98# (4/10/24), stated weight 108# (4/16/24), CBW measured 118# via bed scale. Need updated measurement to confirm. Suspected malnutrition, however insufficient data to determine at this time. Denies N/V at present. Will continue to monitor.     Goals: PO intake >70% meals through f/u  Monitor: PO intake, diet order, lab values, weight trends, skin integrity, plan of care     Recommendations: See above     PES  Statement: PES Statement  Nutrition Diagnosis: Inadequate Energy Intake  Related To:: Decreased ability to consume sufficient energy  As Evidenced By:: weight loss  Nutritional Needs Met?: No                                   Date: 04/17/24  Signature: BECCA Quinn

## 2024-04-17 NOTE — NURSING NOTE
Pt /co chest discomfort 10/10. Notified Dr. Estevez made aware. EKG ordered and PRN tylenol to administer. Pt's VSS except for elevated BP. Will continue to monitor.

## 2024-04-17 NOTE — PROGRESS NOTES
Occupational Therapy -  Initial Evaluation     Patient: Arielle GUAJARDO Isidro  [facility] [place] [address] [facility]  MRN: [id number]  Today's date: 4/17/2024    HISTORY OF PRESENT ILLNESS     Arielle is a 63 y.o. female admitted on 4/16/2024 with Pericardial effusion [I31.39]  Acute chest pain [R07.9]  Pericarditis, unspecified chronicity, unspecified type [I31.9]. Principal problem is Acute chest pain.    Past Medical History  Arielle has a past medical history of De Quervain's tenosynovitis, Essential (primary) hypertension, Follicular carcinoma of thyroid (CMS/HCC), Gastro-esophageal reflux, Hand ulceration (CMS/HCC), Hyperlipidemia, unspecified, Interstitial lung disease (CMS/HCC), Leg ulcer (CMS/HCC), Lung nodule, Lupus (CMS/HCC), Osteomyelitis of hand (CMS/HCC), Osteoporosis, Pulmonary hypertension (CMS/HCC), Raynaud's disease, Reflux esophagitis, Scleroderma (CMS/HCC), and Screening mammogram for breast cancer (05/04/2021).    History of Present Illness  Pt presented initially for evaluation of worsening chest pain, likely in the setting of large pericardial effusion, CTA Chest upon admission revealing stable cardiomegaly and large pericardial effusion    PRIOR LEVEL OF FUNCTION AND LIVING ENVIRONMENT     Prior Level of Function      Flowsheet Row Most Recent Value   Dominant Hand right   Ambulation independent   Transferring independent   Toileting independent   Bathing assistive equipment and person   Dressing assistive person   Eating independent   IADLs assistive person   Driving/Transportation friends/family   Prior Level of Function Comment [facility] 24/7, receives assist for all BADLs and IADLs   Assistive Device Currently Used at Home shower chair, stair glide, walker, front-wheeled          Prior Living Environment      Flowsheet Row Most Recent Value   People in Home alone   Current Living Arrangements home   Living Environment Comment pt lives alone in [facility] with 4STE, 24/7 caregiver             VITALS AND PAIN     OT Vitals      Date/Time Pulse SpO2 Pt Activity O2 Therapy O2 Del Method O2 Flow Rate BP BP Location BP Method [facility]   04/17/24 0955 105 97 % At rest Supplemental oxygen Nasal cannula 2 L/min 130/61 Right upper arm Automatic KB          OT Pain      Date/Time Pain Type Location Rating: Rest Rating: Activity Interventions [facility]   04/17/24 0955 Pain Assessment chest 8 10 care clustered KB             Objective   OBJECTIVE     Start time:  0951  End time:  1018  Session Length: 27 min  Mode of Treatment: individual therapy, occupational therapy    General Observations  Patient received in bed, supine. She was agreeable to therapy, no issues or concerns identified by nurse prior to session. no acute distress    Precautions: fall              OT Eval and Treat - 04/17/24 0951          Cognition    Orientation Status oriented x 3     Affect/Mental Status WFL     Follows Commands WFL     Cognitive Function executive function deficit     Executive Function Deficit minimal deficit;insight/awareness of deficits;planning/decision-making     Comment, Cognition pt pleasant and cooperative, receptive to cues and education provided, benefits from min cues for insight into deficits        Upper Extremity Assessment    UE Assessment ROM and Strength WFL except     General Observations R UE>LUE        Upper Extremity Range of Motion    Shoulder, Left (ROM) 100     Hand, Left (ROM) impaired with digit amputations and contractures     Shoulder, Right (ROM) 120     Hand, Right (ROM) impaired with digit amputations and contractures        Motor Skills    Coordination bilateral;fine motor deficit;severe impairment;opposition     Muscle Tone upper extremity(s);WNL        Bed Mobility    Bed Mobility Activities scooting/bridging     [place] minimum assist (75% or more patient effort);1 person assist     Safety/Cues increased time to complete;hand placement;minimal;verbal cues     Assistive Device bed  rails;draw sheet     Comment pt deferred OOB in setting of pain this session        Grooming    Tasks washes, rinses and dries face;oral care (brushing teeth, cleaning dentures)     [place] supervision;set up;1 person assist     Safety/Cues increased time to complete;verbal cues;minimal;compensatory techniques     Position long sitting;sitting up in bed     Setup Assistance adaptive equipment set-up;open containers;obtain supplies     Comment assist due to impaired fine motor coordination, pt has established compensatory strategies for impaired ROM        Balance    Static Sitting Balance WFL;long sitting     Dynamic Sitting Balance WFL        Impairments/Safety Issues    Impairments Affecting Function balance;endurance/activity tolerance;shortness of breath;range of motion (ROM);coordination;pain     Cognitive Impairments, Safety/Performance insight into deficits/self-awareness     Functional Endurance pt decreased from PLOF     Safety Issues Affecting Function insight into deficits/self-awareness     Comment, Safety Issues/Impairments min cues, pt receptive        Upper Extremity (Therapeutic Exercise)    Exercise Position/Type seated;AROM (active range of motion)     General Exercise bicep curl;wrist curls flexion;bilateral;scapular stabilization     Range of Motion Exercises shoulder flexion/extension                                    Education Documentation  Self-Care, taught by Evita Jade OT at 4/17/2024  3:55 PM.  Learner: Patient  Readiness: Acceptance  Method: Explanation  Response: Verbalizes Understanding  Comment: pt educated on role of OT, dc planning, activity recommendations, safety recs, pt receptive    Activity, taught by Evita Jade OT at 4/17/2024  3:55 PM.  Learner: Patient  Readiness: Acceptance  Method: Explanation  Response: Verbalizes Understanding  Comment: pt educated on role of OT, dc planning, activity recommendations, safety recs, pt receptive    Safety, taught by  Evita Jade OT at 4/17/2024  3:55 PM.  Learner: Patient  Readiness: Acceptance  Method: Explanation  Response: Verbalizes Understanding  Comment: pt educated on role of OT, dc planning, activity recommendations, safety recs, pt receptive    Medication Management, taught by Evita Jade OT at 4/17/2024  3:55 PM.  Learner: Patient  Readiness: Acceptance  Method: Explanation  Response: Verbalizes Understanding  Comment: pt educated on role of OT, dc planning, activity recommendations, safety recs, pt receptive    Home Safety, taught by Evita Jade OT at 4/17/2024  3:55 PM.  Learner: Patient  Readiness: Acceptance  Method: Explanation  Response: Verbalizes Understanding  Comment: pt educated on role of OT, dc planning, activity recommendations, safety recs, pt receptive        Session Outcome  Patient supine, in bed at end of session, call light in reach, personal items in reach, all needs met. Nursing notified about change in vital signs, patient's performance, patient's position, and patient's response to therapy/activity.    AM-PAC™ - ADL (Current Function)     Putting on/taking off regular lower body clothing 2 - A Lot   Bathing 2 - A Lot   Toileting 3 - A Little   Putting on/taking off regular upper body clothing 3 - A Little   Help for taking care of personal grooming 3 - A Little   Eating meals 4 - None   AM-PAC™ ADL Score 17      ASSESSMENT AND PLAN     Progress Summary  OT evaluation complete, pt presents with functional deficits which impact ADL performance including pain, and decreased activity tolerance, balance, ROM, strength, and fine motor coordination. Pt required Min A for bed mobility with cues for technique. Pt with reports of significant pain, deferring OOB this session. Pt completed grooming ADLs while seated upright in bed with supervision/setup with assist for opening containers due to pt impaired FMC. pt pleasant and cooperative, benefits from min cues for insight into  "deficits. pt would benefit from continued OT services to maximize safety and funcitonal independence. Recommend discharge to home with resumption of 24/7 assist and homecare OT services when medically appropriate.    Patient/Family Therapy Goal Statement: \"go home\"    OT Plan      Flowsheet Row Most Recent Value   Rehab Potential good, to achieve stated therapy goals at 04/17/2024 0951   Therapy Frequency 4 times/wk at 04/17/2024 0951   Planned Therapy Interventions activity tolerance training, occupation/activity based interventions, IADL retraining, patient/caregiver education/training, strengthening exercise, adaptive equipment training, BADL retraining, functional balance retraining, ROM/therapeutic exercise at 04/17/2024 0951            OT Discharge Recommendations      Flowsheet Row Most Recent Value   OT Recommended Discharge Disposition home with assistance, home with home health at 04/17/2024 0951   Anticipated Equipment Needs if Discharged Home (OT) other (see comments)  [TBD] at 04/17/2024 0951                 OT Goals      Flowsheet Row Most Recent Value   Transfer Goal 1    Activity/Assistive Device toilet at 04/17/2024 0951   [place] minimum assist (75% or more patient effort) at 04/17/2024 0951   Time Frame short-term goal (STG) at 04/17/2024 0951   Bathing Goal 1    Activity/Assistive Device bathing skills, all at 04/17/2024 0951   [place] minimum assist (75% or more patient effort) at 04/17/2024 0951   Time Frame short-term goal (STG) at 04/17/2024 0951   Dressing Goal 1    Activity/Adaptive Equipment upper body dressing at 04/17/2024 0951   [place] modified independence at 04/17/2024 0951   Time Frame short-term goal (STG) at 04/17/2024 0951   Dressing Goal 2    Activity/Adaptive Equipment lower body dressing at 04/17/2024 0951   [place] minimum assist (75% or more patient effort) at 04/17/2024 0951   Time Frame short-term goal (STG) at 04/17/2024 0951   Toileting Goal 1  "   Activity/Assistive Device toileting skills, all at 04/17/2024 0951   [place] minimum assist (75% or more patient effort) at 04/17/2024 0951   Time Frame short-term goal (STG) at 04/17/2024 0951

## 2024-04-17 NOTE — NURSING NOTE
Pt chest discomfort unchange after given tylenol. Notified  Dr. Castañeda ordered Morphine 2mg IV X1. Will continue to monitor.

## 2024-04-17 NOTE — NURSING NOTE
Pt reported that chest discomfort has not subsided despite received opioid and unable to take deep breath w/o discomfort. Dr. Castañeda made aware, recommended to encourage pt to sit-up in bed and administer PRN medication.

## 2024-04-17 NOTE — PLAN OF CARE
Problem: Self-Care Deficit  Goal: Improved Ability to Complete Activities of Daily Living  Intervention: Promote Activity and Functional [place]  Flowsheets (Taken 4/17/2024 0169)  Activity Assistance Provided: assistance, 1 person  Adaptive Equipment Use: use encouraged  Self-Care Promotion: BADL personal objects within reach  Cognitive Support Measures: physical activity promoted   OT evaluation complete, recommend discharge home with resumption of 24/7 assist and homecare services when medically appropriate

## 2024-04-17 NOTE — PROGRESS NOTES
Hospital Medicine Service -  Daily Progress Note       SUBJECTIVE   Interval History: Patient was seen and examined at the bedside. Was resting comfortably in bed. Endorses constant L sided chest pressure unchanged from admission. Denied any other complaints.      OBJECTIVE      Vital signs in last 24 hours:  Temp:  [36.3 °C (97.4 °F)-36.7 °C (98 °F)] 36.3 °C (97.4 °F)  Heart Rate:  [] 101  Resp:  [16-25] 18  BP: (111-189)/(57-91) 111/57    Intake/Output Summary (Last 24 hours) at 4/17/2024 1335  Last data filed at 4/17/2024 0230  Gross per 24 hour   Intake --   Output 400 ml   Net -400 ml       PHYSICAL EXAMINATION      Physical Exam  General: Chronically Ill Appearing, NAD, Conversant, AAOx3  HEENT: PERRL, EOMI, MMM  Cardiovascular: Decreased heart sounds, S1/S2, No murmur appreciated  Respiratory: CTA B/L  Abdomen: Soft and non-tender, normoactive BS's  Extremities: No LE edema noted  Skin: Chronic scarring noted on face/extremities/hands  Psych: Appropriate mood and affect      LINES, CATHETERS, DRAINS, AIRWAYS, AND WOUNDS   Lines, Drains, and Airways:  Wounds (agree with documentation and present on admission):  Peripheral IV (Adult) 04/16/24 Anterior;Right Forearm (Active)   Number of days: 1       Wound Other (comment) Anterior;Left Toe (Great) (Active)   Number of days: 44       Wound Pressure Injury Anterior;Left Ankle (Active)   Number of days: 44       Wound Pressure Injury Anterior;Right Ankle (Active)   Number of days: 44       Wound Other (comment) Anterior;Right Toe (2nd) (Active)   Number of days: 44         Comments:      LABS / IMAGING / TELE      Labs  Reviewed, Hgb 10.3       Imaging  I have independently reviewed the pertinent imaging from the last 24 hrs.    Telemetry  NSR     ASSESSMENT AND PLAN      * Acute chest pain  Assessment & Plan  -63-year-old female with above-mentioned medical problems who presented initially for evaluation of worsening chest pain, likely in the setting of  large pericardial effusion  -Telemetry without arrhythmias, HST 28->30, CRP WNL,    -Per chart review, had coronary CT last month (03/2024) which revealed two-vessel coronary artery disease as above that is moderate in severity. ECHO earlier this month revealing EF: 60%. No regional wall motion abnormalities .   -Cardiology evaluated, believe etiology of her pain is inflammatory in nature 2/2 to her auto-immune disease, recommending resuming steroids   -Currently patient is refusing plavix/statin, will discuss with patient further   -Have restarted prednisone 20mg qD today and have further consulted Rheumatology, appreciate assistance with this case     Lung mass  Assessment & Plan  -CT scan showed lung mass 16 mm in size in the left lower lobe, she does have history of tobacco abuse in the past  -Have consulted pulmonology for further recommendations, will eventually likely require bronchoscopy    Pericardial effusion  Assessment & Plan  -Known history  -CTA Chest upon admission revealing stable cardiomegaly and large pericardial effusion   -Cardiology consulted and following, recommending repeat ECHO in 1 month, continuing colchicine, and resuming steroids  -Have started prednisone 20mg qD today, further added PPI for GI ppx     Hypothyroid  Assessment & Plan  Chronic history of hypothyroidism, continue levothyroxine    Scleroderma (CMS/HCC)  Assessment & Plan  -Chronic history of Raynaud's phenomenon/scleroderma  -Continue tocilizumab on discharge  -Rheumatology consulted as noted above     Interstitial lung disease (CMS/HCC)  Assessment & Plan  As above    Pulmonary hypertension (CMS/HCC)  Assessment & Plan  -Chronic history of pulmonary hypertension  -Continuing home sildenafil/macitentan    Essential (primary) hypertension  Assessment & Plan  -Chronic history of hypertension  -Normotensive currently  -Continuing home amlodipine 10mg qD          VTE Assessment: [place]    VTE Prophylaxis:  SCD's    Code  Status: Full Code      Estimated Discharge Date: 4/19/2024   Disposition Planning: Pending Pulm/Rheumatology Evaluation     Gerard Olsen DO  4/17/2024

## 2024-04-17 NOTE — ASSESSMENT & PLAN NOTE
-Known history  -CTA Chest upon admission revealing stable cardiomegaly and large pericardial effusion   -Cardiology consulted and following, recommending repeat ECHO in 1 month, continuing colchicine, and resuming steroids  -Have started prednisone 20mg qD as above, further added PPI for GI ppx

## 2024-04-17 NOTE — PLAN OF CARE
Plan of Care Review  Plan of Care Reviewed With: patient  Progress: improving  Outcome Evaluation: Pt arrived from Ed to unit with c/o chest discomfot 10/10, pain decreased after receiving IV morphone.

## 2024-04-17 NOTE — CONSULTS
Cardiology Pulmonary Hypertension  Consult Note          History of Present Illness:    Ms. Felix is a 63 y.o. female with a past medical history of Pulmonary HTN, HTN, Raynaud's Disease, Cutaneous Systemic Scleroderma (dx 1998), ILD, PE (3/2023). She is a patient of Dr. Nguyễn. She was previously followed by Dr. Jos Valdez [facility]. Echocardiogram from 4/21/2022 showed LVEF: 55-60%, mild aortic valve thickening, pulmonary artery systolic pressure: 52 mmHg and trivial pericardial effusion. LHC and RHC (separate occasions) over the last 5-10 years which showed normal hemodynamics and normal coronaries. She had a RHC 9/02/2020 showing top-normal right sided filling pressures with mild pulmonary hypertension, top-normal PVR and normal pulmonary capillary wedge pressure. The cardiac index was normal, seen below.     On 6/27/2022, she was in [facility] [place] [facility] [place] chest pain. EKG: NSR without ischemic changes. hsTrop: 12->16, d-dimer: 0.56, CXR showed cardiomegaly and diffuse interstitial prominence.     Dr. Nguyễn ordered echocardiogram, which was completed on 8/22/2022 and showed estimated RVSP = 50-55 mmHg, normal-sized LV with normal LV systolic function. Estimated EF 55- 60%. Wall motion grossly normal, mild concentric left ventricular hypertrophy, grade I LV diastolic dysfunction, probably normal RV size and function.    At her initial visit on 10/11/2022, she reported that she felt very short of breath with minimal activity most of the time. She reports that this started about 1 year prior and had progressively gotten worse. She described PND and bendopnea. She reported that she experienced ankle swelling, worsened over the past year and usually worse towards the end of the night. She also reported some swelling in her abdomen. She reported daily palpations. I recommended a RHC which was done 11/28/2022 and showed: Normal right sided filling pressures. Mildly elevated left sided filling pressures. Precapillary  pulmonary hypertension with mean PA 36 mmHg.    She was hospitalized on 3/19/2023 at [facility] for PE diagnosed on V/Q scan. She reports that she had presented with left arm pain and heaviness. She states that she was started on Apixaban. One week after her last office visit (4/6/2023), she [place] [facility] [place], epistaxis and hemoptysis. Echocardiogram showed: left ventricular cavity size normal with mild left ventricular hypertrophy and preserved systolic function. Estimated EF 65%. Chest CTA showed no evidence of PE; Apixaban was discontinued.     She had a repeat echocardiogram (6/2023) which showed: Normal left ventricular cavity size and mild concentric left ventricular hypertrophy. Normal systolic function. EF: 60%. Normal right ventricular structure and function. Mild tricuspid valve regurgitation with estimated RVSP: 55 mmHg. Compared to previous study from 4/14/2023, estimated right ventricular systolic pressure were higher.    At her last office visit on 7/25/2023, she reported that she was not sure if she received/started the Macitentan. She stated at the time we initiated it, her Pulmonologist also started her on a new medication (Mycophenolate), and she had significant GI intolerance with this. It was trialed at various doses but ultimately stopped. At that visit, I asked her to check if she had Macitentan and if she did to start it and monitor her SpO2.    She underwent V/Q scan in September 2023, which showed intermediate-to-high probability of pulmonary embolic disease, as a result she completed CTA Chest PE which showed: no evidence for filling defect or vessel cutoff to suggest pulmonary embolism. Enlargement of the pulmonary arteries compatible with pulmonary arterial hypertension was seen.    She started Macitentan around August or September 2023.    She [place] [facility] [place] with chest pressure, palpitations, nausea. She was noted to have hypoxia and tachypnea  at presentation. Found to have moderate to large pericardial effusion without tamponade.   She was started on colchicine 0.6 mg BID on 3/4, and when she did not improve sympomatically prednisone 20 mg daily was added on 3/5. on 3/7, developed multiple episodes of diarrhea and colchicine dose decreased to 0.3 mg. Coronary CTA done as part of the ischemic workup showed moderate 2 vessel CAD and patient recommended to begin atorvastatin 20 mg daily and clopidogrel 75 mg daily but she refused those two medications.     She was discharged on Colchicine 0.3 mg for at least 3 months and Prednisone 20 mg for a total two week course until 3/19, then decrease the dose to 10 mg daily for 2 weeks until 4/2/2024,  then decrease dose to 5 mg daily for 2 weeks until 4/16.     She reports she tapered off prednosione as instructed but has continued to take Colchicine as prescribed.   She completed an echocardiogram 04/10 which revealed Small-to-moderate sized, circumferential pericardial effusion. No echocardiographic evidence for cardiac tamponade.   Compared to previous study from 3/4/2024, no significant change. Pericardial effusion size is similar, estimated right atrial pressure lower.     [place] [facility] [place] increased pleuritic chest pain. On presentation to the ER she was noted to be hypertensive and tachycardic. Labs showed mild elevated troponin, elevated BNP, leukocytosis CT angio of the chest showed no evidence of PE, did show a large pericardial effusion, emphysema, pulmonary hypertension, 16 mm left lobe lung mass.  EKG showed sinus tachycardia, normal axis, nonspecific ST changes or MI changes.     She reports she will be starting a new injectable medication prescribed by her Rheumatologist on 04/29.     ---    Rheumatology: [facility] ([facility])      Past Medical / Surgical History:  Pulmonary HTN, HTN, Raynaud's Disease, Cutaneous Systemic Scleroderma (dx 1998), ILD, PE (3/2023), Follicular Carcinoma of Thyroid,  "Tenosynovitis, de Quervain, h/o Hernia Repair, h/o Appendicitis, h/o Digit Amputation, H/o Total Thyroidectomy (Dr. Pacheco at [facility]; 4/2013)    Family & Social History: She is , she has two children. She works as an .     Tobacco: former 2005  EtOH: never   Illicits: never     Her brother has CAD with stent  1st cousin with SLE  Both parents had \"heart problems\"    Allergies:   Allergies   Allergen Reactions   • Aspirin Shortness of breath     Other reaction(s): Unknown  \"stop breathing\"     • Ibuprofen Shortness of breath     Stop breathing   • Iodine Shortness of breath     Other reaction(s): Unknown   • Iodine And Iodide Containing Products Shortness of breath     ? rash   • Penicillins Shortness of breath     Other reaction(s): Unknown   • Sulfa (Sulfonamide Antibiotics) Angioedema   • Clindamycin    • Hydrochlorothiazide      Other reaction(s): Abdominal Pain   Slow heart rate   • Oxycodone      Other reaction(s): Vomiting   • Sulfamethoxazole-Trimethoprim      Other reaction(s): Vomiting, Weakness    • Latex Rash       Medications:   Current Facility-Administered Medications   Medication Dose Route Frequency Provider Last Rate Last Admin   • acetaminophen (TYLENOL) tablet 650 mg  650 mg oral q6h PRN Capo Estevez MD   650 mg at 04/17/24 0556   • amLODIPine (NORVASC) tablet 10 mg  10 mg oral q AM Capo Estevez MD   10 mg at 04/17/24 0802   • colchicine (COLCRYS) tablet 0.3 mg  0.3 mg oral Daily Capo Estevez MD   0.3 mg at 04/17/24 0802   • glucose chewable tablet 16-32 g of dextrose  16-32 g of dextrose oral PRN Capo Estevez MD        Or   • dextrose 40 % oral gel 15-30 g of dextrose  15-30 g of dextrose oral PRN Capo Estevez MD        Or   • glucagon (GLUCAGEN) injection 1 mg  1 mg intramuscular PRN Capo Estevez MD        Or   • dextrose 50 % in water (D50) injection 12.5 g  25 mL intravenous PRN Capo Estevez MD       • docusate sodium (COLACE) capsule 100 mg  100 mg oral Daily PRN Capo Estevez MD     "   • levothyroxine (SYNTHROID) tablet 100 mcg  100 mcg oral Daily (6:30a) Capo Estevez MD   100 mcg at 04/17/24 0556   • macitentan (OPSUMIT) tablet 10 mg  10 mg oral Daily Teri Castañeda MD   10 mg at 04/17/24 0802   • meclizine (ANTIVERT) tablet 25 mg  25 mg oral Daily PRN Capo Estevez MD       • ondansetron ODT (ZOFRAN-ODT) disintegrating tablet 4 mg  4 mg oral q8h PRN Teri Castañeda MD        Or   • ondansetron (ZOFRAN) injection 4 mg  4 mg intravenous q8h PRN Teri Castañeda MD       • sildenafiL (pulm.hypertension) (REVATIO) tablet 20 mg  20 mg oral TID Capo Estevez MD   20 mg at 04/17/24 0802       Review of Systems:   All other systems reviewed and are negative except as per HPI.    Physical Exam:     Wt Readings from Last 10 Encounters:   04/17/24 53.5 kg (118 lb)   04/10/24 44.5 kg (98 lb)   03/16/24 44.6 kg (98 lb 5.2 oz)   02/01/24 47.6 kg (105 lb)   07/25/23 50.8 kg (112 lb)   07/25/23 50.8 kg (112 lb)   06/21/23 51.3 kg (113 lb)   04/19/23 51.7 kg (113 lb 14.4 oz)   04/06/23 52.6 kg (116 lb)   01/18/23 54.4 kg (120 lb)       BP Readings from Last 5 Encounters:   04/17/24 129/64   04/10/24 128/72   03/16/24 (!) 136/6   02/01/24 120/70   07/25/23 130/80       Vitals:    04/17/24 0808   BP: 129/64   Pulse: 74   Resp: 18   Temp: 36.6 °C (97.8 °F)   SpO2: 95%       Body mass index is 19.05 kg/m².  Body surface area is 1.58 meters squared.    Constitutional: NAD, AAOx3  HENT: Normocephalic, atraumatic head, sclerae anicteric, no cervical lymphadenopathy, trachea midline  Cardiovascular: RRR, no murmurs, rubs or gallops, JVP: 7 cm H2O   cm H2O, no carotid bruits.  Respiratory: CTA bilateral lung fields, no wheezes, rales or rhonchi  GI: soft, non-tender/non-distended  Musculoskeletal / Extremities: no peripheral edema, +2 pulses bilateral radial, DP/PT arteries, skin tightening on face and fingertips  Skin: no rashes  Neuro / Psych: no focal deficits, CNII-XII grossly intact, appropriate, cooperative    Diagnostic  Data:     Results from last 7 days   Lab Units 04/17/24  0352 04/16/24  1527   SODIUM mEQ/L 141 142   POTASSIUM mEQ/L 4.1 4.3   CHLORIDE mEQ/L 109* 110*   CO2 mEQ/L 20* 24   BUN mg/dL 24 23   CREATININE mg/dL 1.0 0.8   CALCIUM mg/dL 9.3 9.8   ALBUMIN g/dL  --  4.0   BILIRUBIN TOTAL mg/dL  --  0.3   ALK PHOS IU/L  --  68   ALT IU/L  --  8   AST IU/L  --  16   GLUCOSE mg/dL 72 86   MAGNESIUM mg/dL 1.8  --      Results from last 7 days   Lab Units 04/17/24  0352 04/16/24  1527   WBC K/uL 8.73 10.69*   HEMOGLOBIN g/dL 10.3* 11.5*   HEMATOCRIT % 34.3* 37.8   MCV fL 92.0 93.6   PLATELETS K/uL 235 270     Component      Latest [facility] 4/13/2023 3/3/2024 3/12/2024   Lactate      0.4 - 2.0 mmol/L 1.0  1.1  0.9      Component      Latest [facility] 4/13/2023 2/27/2024 3/3/2024   BNP      <=100 pg/mL 105 (H)  82  119 (H)       Component      Latest [facility] 3/3/2024   High Sens Troponin I      <15.0 pg/mL 12.6    High Sens Troponin I       12.2      Component      Latest [facility] 3/4/2024   CRP      <7.00 mg/L 8.90 (H)       Component      Latest [facility] 3/4/2024   Sed Rate      0 - 30 mm/hr 119 (H)       Component      Latest [facility] 3/3/2024   D-Dimer, Quant      0.00 - 0.50 ug/mL FEU 0.68 (H)       Component      Latest Ref [facility] 4/18/2023 9/27/2023   Sodium      134 - 144 mmol/L 139  140    Potassium, Bld      3.5 - 5.2 mmol/L 4.4  4.1    Chloride      96 - 106 mmol/L 108  107 (H)    CO2      20 - 29 mmol/L 23  20    BUN      8 - 27 mg/dL 26 (H)  18    Creatinine      0.57 - 1.00 mg/dL 1.1  0.96    Glucose      70 - 99 mg/dL 82  83    Calcium      8.7 - 10.3 mg/dL 9.1  9.5    eGFR      >59 mL/min/1.73 >60.0  67      Component      Latest Ref Rng 4/13/2023   Hemoglobin A1C      <5.7 % 4.4      Component      Latest Ref Rng 4/13/2023 4/18/2023   WBC      3.80 - 10.50 K/uL 7.12  6.95    Hemoglobin      11.8 - 15.7 g/dL 10.6 (L)  10.1 (L)    Hematocrit      35.0 - 45.0 % 34.3 (L)  33.0 (L)    MCV      83.0 - 98.0 fL 87.7  90.4     Platelets      150 - 369 K/uL 267  256        Component      Latest Ref Rng 4/14/2023   Triglycerides      30 - 149 mg/dL 44    Cholesterol      <=200 mg/dL 194    HDL      >=55 mg/dL 49 (L)    LDL Calculated      <=100 mg/dL 136 (H)    Non-HDL, Calculated      mg/dL 145    RISK      <=5.0  4.0       Component      Latest Ref Rng 4/13/2023   High Sens Troponin I      <15.0 pg/mL 23.0 (H)       Component      Latest Ref Rng 4/14/2023   Ferritin      11 - 250 ng/mL 75      Component      Latest Ref Rng 4/13/2023   TSH      0.34 - 5.60 mIU/L 4.71      Component      Latest Ref Rng 4/14/2023   Iron      35 - 150 ug/dL 29 (L)    TIBC      270 - 460 ug/dL 245 (L)    UIBC      180 - 360 ug/dL 216    Iron Saturation      15 - 45 % 12 (L)        Component      Latest Ref Rng 6/27/2022 4/13/2023   BNP      <=100 pg/mL 111 (H)  105 (H)                                     ---    ECG (4/16/2024, personally reviewed):   Sinus tachycardia   Nonspecific ST and T wave abnormality   HR: 129 bpm     ---    TTE (04/10/2024):  Interpretation Summary    1. Normal left ventricular cavity size with mild concentric left ventricular hypertrophy. Normal systolic function. EF: 60%. No regional wall motion abnormalities. Cannot determine diastolic function. Global longitudinal strain not reported due to technical limitations of study.   2. Aortic valve has three cusps. Trace aortic regurgitation. Aortic valve and root sclerosis.  3. Thickened mitral valve leaflets. Trace mitral valve regurgitation. Normal left atrial size.   4. Normal right ventricular structure and function. Trace tricuspid valve regurgitation with estimated RVSP: 51 mmHg (assuming right atrial pressure of 3 mmHg). Normal right atrial size. Pulmonic valve structurally normal. Trace pulmonic valve regurgitation. Pulsed wave Doppler of the RVOT systolic profile shows decreased acceleration times and geometry consistent with mildly elevated PVR.  5. IVC size is normal with >  50% inspiratory collapse consistent with normal right atrial pressure. Small-to-moderate sized, circumferential pericardial effusion. No echocardiographic evidence for cardiac tamponade.   6. Compared to previous study from 3/4/2024, no significant change. Pericardial effusion size is similar, estimated right atrial pressure lower.         CTA Abd / Pelvis (3/13/2024, personally reviewed):   There is dilatation of the of proximal/mid small bowel with relatively abrupt  transition left hemiabdomen, likely obstruction. Questionable lesion at the  transition point. This could be better evaluated with CT or MRI enterography.     Aorta and major branches patent. Superior mesenteric artery patent without  significant stenosis.  Suspect moderate stenosis of the origins of the of renal arteries bilaterally.     Large pericardial effusion, no change.     Patchy opacities lower lungs, similar to prior.     TTE (3/4/2024, personally reviewed):   • Normal-sized left ventricle. Mild left ventricular hypertrophy. Preserved systolic function. LVEF 60%. No regional wall motion abnormalities. Grade II diastolic dysfunction.  Normal global longitudinal strain (-20%).  • The aortic valve is not well seen.  Cannot determine the number of cusps.  Sclerotic leaflets.  No aortic stenosis.  Trace aortic insufficiency.  • Normal sized aortic root.  The visualized portion of the ascending aorta is normal in size.  • The mitral valve leaflets are thickened. Mild mitral annular calcification. Trace mitral regurgitation.   • Mildly dilated left atrium.  • Normal-sized right ventricle. Normal right ventricular systolic function.  • Thickened tricuspid valve. There is mild tricuspid regurgitation with estimated RVSP of 46 mmHg.   • Pulmonic valve is not well visualized. Trace pulmonic regurgitation.   • Mildly dilated right atrium.  • IVC is enlarged with <50% respiratory variation consistent with elevated right atrial filling pressure (at least  15 mmHg).   • Moderate, circumferential, circumferential pericardial effusion.  The largest pocket is located inferolaterally.  Elevated right atrial pressure.  No other clear echocardiographic features of increased pericardial pressure.  Correlate clinically.   • Compared to previous TTE from 6/21/2023, pericardial effusion now moderate in size.  Right atrial pressure now elevated.       CTA Chest (3/3/2024):   IMPRESSION:  1.  No evidence of acute pulmonary embolism to the level of the segmental  branches.  2.  Moderate to large pericardial effusion measuring higher than simple fluid  density.  3.  Lower lobe lung field predominant interstitial thickening with some relative  subpleural sparing, consistent with a chronic interstitial lung disease,  unchanged from the prior study.  4.  Continued bilateral axillary, mediastinal, and bilateral hilar  lymphadenopathy, not definitely changed from the prior study, possibly related  to the patient's sarcoid.  5.  Emphysema.     CTA Chest PE (10/4/2023):  IMPRESSION:  1. No evidence for filling defect or vessel cutoff to suggest pulmonary  embolism.  Enlargement of the pulmonary arteries compatible with pulmonary  arterial hypertension.  2. Changes throughout the lungs as described above which can be seen with  systemic sclerosis/scleroderma.  Underlying pneumonia the lung bases cannot be  entirely excluded in the proper clinical setting.  3.  Additional stable findings as described above.        VQ (9/12/2023):  IMPRESSION:  Intermediate to high probability of pulmonary embolic disease.     6MWT (7/25/2023, on Sildenafil 20 mg TID):        TTE (6/21/2023, personally reviewed):  1. Normal left ventricular cavity size and mild concentric left ventricular hypertrophy. Normal systolic function. EF: 60%. No regional wall motion abnormalities. Grade I diastolic dysfunction.   2. Aortic valve cusps not well visualized. Trace aortic regurgitation. Aortic valve and root  sclerosis.  3. Thickened mitral valve leaflets. Mild mitral annular calcification. Trace mitral regurgitation. Mildly dilated left atrial size.   4. Normal right ventricular structure and function. Mild tricuspid valve regurgitation with estimated RVSP: 55 mmHg (assuming right atrial pressure of 3 mmHg). Normal right atrial size. Pulmonic valve not well visualized. Trace pulmonic valve regurgitation. Pulsed wave Doppler of the RVOT systolic profile show decreased acceleration times ( msec) and late systolic notching consistent with elevated PVR.   5. IVC size is normal with > 50% inspiratory collapse consistent with normal right atrial pressure. Small pericardial effusion without echocardiographic evidence of tamponade.  6. Compared to previous study from 4/14/2023, estimated right ventricular systolic pressure is higher.        TTE (4/14/2023, personally reviewed):   • The left ventricular cavity size is normal with mild left ventricular hypertrophy and preserved systolic function. Estimated EF 65%. No regional wall motion abnormalities. Grade I diastolic dysfunction.     • The aortic valve is tricuspid. Aortic valve sclerosis. Trace aortic regurgitation.      • The visualized portions of the aortic root and ascending aorta are normal in size.     • The mitral valve is mildly thickened. Mild mitral annular calcification. Trace mitral regurgitation.       • The left atrium is severely dilated.      • The right ventricle is normal in size with preserved systolic function     • The pulmonic valve is not well seen.     • The tricuspid valve is normal in appearance. mild tricuspid regurgitation with an estimated RVSP of 34 mmHg.       • Right atrium is normal in size.     • The IVC is small and collapses > 50% with inspiration consistent with normal right atrial pressures.     • Small pericardial effusion, mostly posterior.       • Compared to previous echocardiogram from 8/22/2022, there is no significant  change        TTE (11/28/2022, personally reviewed):   • Normal right- and mildly elevated left-sided filling pressures (RA: 3 mmHg, PCWP: 13 mmHg)  • Elevated pulmonary artery pressures (60/22(35 mmHg)) in the setting of PCWP: 13 mmHg.   • Normal cardiac output and index (CO: 5.1 L/min, CI: 3.2 L/min/m2)  • PVR: 4.3 Wood units. SVR: 1840 dynes/sec/cm5      TTE (8/22/2022, personally reviewed):  · Normal-sized LV. Normal LV systolic function. Estimated EF 55- 60%. Wall motion appears grossly normal. Mild concentric left ventricular hypertrophy. Grade I LV diastolic dysfunction.  · Pulmonic valve not well visualized. Grossly normal pulmonic valve structure. Trace pulmonic valve regurgitation. No pulmonic valve stenosis.  · Aortic valve not well visualized. Aortic valve not well seen but likely trileaflet. Focal aortic valve calcification present. Sclerotic aortic valve leaflets. Mild aortic valve regurgitation. No aortic valve stenosis.  · RV not well visualized. Normal-sized RV. Normal RV systolic function.  · Normal-sized RA.  · There is a small loculated pericardial effusion anterior to the right atrium. No cardiac tamponade.  · Sclerotic mitral valve. Mitral valve calcification of the anterior leaflet present.  · Trace mitral valve regurgitation.  · No significant mitral valve stenosis.  · Normal-sized IVC. IVC demonstrates normal respiratory collapse.  · Tricuspid valve structure is grossly normal. Mild to moderate tricuspid valve regurgitation.  · Estimated RVSP = 50-55 mmHg.  · Mildly dilated LA.  · No previous echocardiogram available for comparison.     TTE (4/21/2022, outside study):  This result has an attachment that is not available.   •  A complete transthoracic echocardiogram (including 2D, color flow   Doppler, spectral Doppler and M-mode imaging) was performed using the   standard protocol. The study quality was adequate.   •  The left ventricle is normal in size. There is moderately increased   wall  thickness consistent with moderate concentric hypertrophy.   Endocardium is incompletely visualized, but no regional wall motion   abnormalities are noted in the visualized segments. Normal left   ventricular ejection fraction. The left ventricular ejection fraction by   visual estimate is 55% to 60%.   •  The right ventricle is normal in size. There is normal function of the   right ventricle.   •  The left atrium is normal in size. Left atrial volume is 58 mL.   •  The right atrial volume measures 52 mL. The right atrial volume index   measures 34 mL/m2.   •  There is trace mitral regurgitation. There is no mitral stenosis.   •  The aortic valve is trileaflet. There is mild aortic valve thickening.   There is no aortic stenosis. There is trace aortic regurgitation.   •  There is mild to moderate tricuspid regurgitation. Pulmonary artery   systolic pressure measures 52 mmHg. The pulmonary artery systolic pressure   is moderately elevated.   •  The aorta measures 3.2 cm at the sinus of Valsalva. The proximal   segment of the ascending aorta is mildly enlarged. The proximal segment of   the ascending aorta measures 3.4 cm. The proximal segment of the ascending   aorta measures 2.2 cm/m2, indexed for body surface area.   •  Trivial pericardial effusion present. There is no echocardiographic   evidence of cardiac tamponade.   •  The inferior vena cava is normal in size (diameter <21 mm) and   decreases >50% in size with inspiration, suggesting a normal right atrial   pressure of 3 mmHg (range 0-5 mm Hg).   •  Compared to the prior study of 3/13/2020, there are no significant   changes.        PFT (3/23/2022):      PFT (7/14/2021):      CT Chest (5/2/2021, outside study):  CLINICAL INFORMATION: Systemic sclerosis. Interstitial lung disease. Groundglass nodule     PROCEDURE: Unenhanced CT of the chest was performed using thin section reconstructions. Images were obtained on inspiration and expiration.     COMPARISON:  June and August 2020     FINDINGS:     LUNGS, PLEURA:     Groundglass opacities with reticulation with subpleural sparing in the lower lobes, without honeycombing or architectural distortion, unchanged.     Right upper lobe groundglass nodule, image 114 of series 3, 8 x 11 mm, previously 6 mm.     Expiratory images are of diagnostic quality and do not demonstrate evidence of clinically significant air trapping.     CARDIOVASCULAR, MEDIASTINUM, THYROID: Coronary calcifications. Trace pericardial effusion.     LYMPH NODES: Subcentimeter mediastinal lymph nodes, unchanged. Unchanged axillary lymph nodes.     SKELETON, CHEST WALL: No destructive bone lesion     UPPER ABDOMEN: Patulous esophagus. 3 mm right renal stone.       RHC (9/02/2020):  RA   8 mmHg   RV   40/5 mmHg   PA (mean)  44/16 (25) mmHg   PCWP   12 mmHg   CO   4.65 lpm (Thermodilution)   CI   2.65 lpm/m-squared   SVI    33 ml/beat/m-squared (lower limit normal 29)   PVR   2.8 mmHg/l/min (Wood units)   SVR   2064 dyne-sec-cm-5         PFT (3/18/2020):      CCTA (3/7/2024, personally reviewed):      LEFT MAIN: Patent.     LAD: Proximal: Calcified plaque with 30 to 50% stenosis.  Mid: Mixed plaque with  50 to 70% stenosis, CT FFR 0.89.  Distal: Patent.  D1: Patent  D2: Patent, CT FFR 0.88     LCX: The circumflex and 1 marginal branch are patent with normal CT FFR.     RCA: Proximal: Calcified plaque with minimal stenosis.  Otherwise the RCA, PDA,  and PLB are patent with normal CT FFR.     CORONARY CALCIUM COMPOSITE AGATSTON SCORE: 313  LM: LAD: 312    LCX: RCA: 1     This places the patient in the HICKS 96th risk percentile for age and gender  matched individuals.     VENTRICULAR FUNCTION AND WALL MOTION     LV EJECTION FRACTION: 66%  LV WALL MOTION: Normal    SUMMARY:  1. Two-vessel coronary artery disease as above that is moderate in severity.  CT  FFR is normal..  2. There is mitral annular calcification and aortic sclerosis.  The right atrium  is enlarged.   There is left ventricular hypertrophy.  There is a moderate to  large circumferential pericardial effusion.  3. Normal left ventricular wall motion and systolic function.  4. Coronary calcium score 313, 96th percentile     TTE (3/13/2020, outside study):  · The study quality was good.   · The left ventricle is normal in size. Top normal left ventricular wall   thickness. There are no segmental wall motion abnormalities. The left   ventricular ejection fraction is 55-60% by visual estimate and 3D   echocardiography. Normal left ventricular ejection fraction.   · There is grade I (mild) LV diastolic dysfunction.   · The right ventricle is normal in size. There is normal function of the   right ventricle.   · The right atrium is mildly dilated.   · There is mild mitral valve anterior leaflet thickening. There is mild   mitral valve leaflet calcification. There is flattening of the mitral   leaflets without raphael prolapse. There is trace mitral regurgitation.   · The aortic valve is trileaflet. There is mild thickening of the aortic   valve. There is mild calcification of the aortic valve. There is no aortic   /stenosis. There is trace aortic regurgitation.   · There is mild tricuspid valve leaflet thickening. There is mild to   moderate tricuspid regurgitation. The pulmonary artery systolic pressure   is moderately elevated. Pulmonary artery systolic pressure measures 50   mmHg. There is mid-systolic notching of the RVOT VTI consistent with   elevated pulmonary vascular resistance.   · The inferior vena cava is normal in size (diameter <21 mm) and decreases   >50% in size with inspiration, suggesting a normal right atrial pressure   of 3 mmHg (range 0-5 mm Hg).   · Trivial loculated pericardial effusion present adjacent to the right   ventricle and adjacent to the right atrium.          Assessment / Plan:     1. Musculoskeletal Pain Including Chest Pain   -Echocardiogram 04/10 revealed Small-to-moderate sized,  circumferential pericardial effusion  - Suspect this is auto-immune disease related inflammation; Recently admitted (03/2024) and treated for pericardial effusion treated with Colchicine and steroid taper.   -She completed taper last week, she continued on Colchicine. Suspect recurrent due to fast steroid taper  - CCTA revealed moderate non-obstructive CAD, calcium score of 313. Refused statin and clopidogrel.     2. Pericardial Effusion  - Small to Moderate in size on echo 04/10 without clinical tamponade  - Will recheck TTE at one month if no interim clinical change   - Continue Colchicine 0.3 mg daily. Could not tolerate 0.6 mg.   -Would restart steroid with plan for very slow taper    3. Pulmonary Hypertension (WHO Group I, NYHA/WHO FC III):   - Occurring in the background of Systemic Scleroderma with ILD. August 2022 TTE showed normal RV size and function, but progressive exertional decline, worsened DLCO and resting tachycardia suggest there may be progression of her pulmonary vascular disease and limitation of cardiac output. This was proven with 11/2022 RHC showing mean PA 35 mmHg (with PCWP 13 mmHg) and PVR 4.3 Wood units. June 2023 TTE showed increase in RVSP. New TTE with RVSP decreased to 40s (even with higher estimated RAP).  - Continue uninterrupted Sildenafil and Macitentan.     4. HTN:   - Mostly controlled  - Continued Amlodipine.   - Would start HCTZ as second agent, if needed    5. Systemic Scleroderma / Raynaud's Disease:   - Per Rheumatology (Outpatient: Dr. Giraldo).      6. ILD:   - Per Pulmonology and Rheumatology (Dr. Giraldo) outpatient.   - She has not tolerated trials of Mycophenolate.     7. PE:   - She has had elevated D-dimer and elevated probability on V/Q scans, but CTA Chest has consistently not shown evidence for acute or chronic thromboembolism. Apixaban has been discontinued.     8. CAD  - LAD and RCA non-obstructive disease on 3/7 CCTA  - LDL goal < 70  -She is allergic to ASA  -At  last admission she refused Clopidogrel and Atorvastatin. Would attempt to re-start this admission.     9. Lung nodule - CTA chest revealed 16 mm left lower lobe masslike density which may represent atelectasis, lung cancer or lymphoma  Pulmonary on consult       RAS Marcos C

## 2024-04-17 NOTE — CONSULTS
Pulmonary Consult Note     Indication for Consultation:  LLL nodule     Consulting Physician:  Dr Wasserman     Primary Care Physician:  Sara Simmons MD     HISTORY OF PRESENT ILLNESS        62 y.o. 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          Of note, patient is known to our service, she has a history of group 1 PH occurring in the background of Systemic Scleroderma with ILD and a history of restrictive lung disease and ILD and used to follow at [facility]. She failed MMF and myfortic secondary to GI distress. She is adamant not to try that again. She had a LLL pulmonary nodule that had an SUV of 4.2 that was thought to be inflammatory back in july of 2023. She was hospitalized in march 2024  for acute dyspnea and was found to have pericarditis and subsequent pericardial effusion. She was started on colcichin and prednione taper which she finished a week ago. She was getting evaluated for initiation of tocilizumab.    She presented to [facility] 04/16 with increased pleuritic chest pain. On presentation to the ER she was noted to be hypertensive and tachycardic. Labs showed mild elevated troponin, elevated BNP, leukocytosis CT angio of the chest showed no evidence of PE, did show a large pericardial effusion, emphysema, pulmonary hypertension, 16 mm left lobe lung mass. EKG showed sinus tachycardia, normal axis, nonspecific ST changes or MI changes.     PAST MEDICAL AND SURGICAL HISTORY        Past Medical History:   Diagnosis Date    De Quervain's tenosynovitis     Essential (primary) hypertension     Follicular carcinoma of thyroid (CMS/HCC)     Gastro-esophageal reflux     Hand ulceration (CMS/HCC)     Hyperlipidemia, unspecified      Interstitial lung disease (CMS/HCC)     Leg ulcer (CMS/HCC)     Lung nodule     Lupus (CMS/HCC)     Osteomyelitis of hand (CMS/HCC)     Osteoporosis     Pulmonary hypertension (CMS/HCC)     Raynaud's disease     Severe    Reflux esophagitis     Scleroderma (CMS/HCC)     Screening mammogram for breast cancer 05/04/2021    BI-RADS category: 1 - Negative (care everywhere @ [place])     Past Surgical History:   Procedure Laterality Date    CARDIAC CATHETERIZATION      COLONOSCOPY      HERNIA REPAIR       MEDICATIONS        Home Medications:    amLODIPine, Take 10 mg by mouth every morning.    benzonatate, Take 100 mg by mouth 3 (three) times a day as needed for cough.    biotin, Take 1,000 mcg by mouth daily.    cholecalciferol (vitamin D3), Take 1,000 Units by mouth daily.    colchicine, Take 0.5 tablets (0.3 mg total) by mouth daily.    cyanocobalamin (vitamin B-12), Take 1,000 mcg by mouth daily.    docusate sodium, Take 100 mg by mouth daily as needed for constipation.    levothyroxine, Take 100 mcg by mouth daily.    macitentan, Take 1 tablet (10 mg total) by mouth daily.    meclizine, Take 25 mg by mouth daily as needed.    mupirocin, Apply 1 application. topically daily. Behind ears    nystatin, Swish and swallow 5 mL 4 (four) times a day.    sildenafiL (pulm.hypertension), Take 1 tablet (20 mg total) by mouth 3 (three) times a day.    ACTEMRA, Infuse 8 mg/kg into a venous catheter every 28 (twentyeight) days.    Current Medications:   amLODIPine  10 mg oral q AM    colchicine  0.3 mg oral Daily    levothyroxine  100 mcg oral Daily (6:30a)    macitentan  10 mg oral Daily    pantoprazole  40 mg oral Daily    predniSONE  20 mg oral Daily    sildenafiL (pulm.hypertension)  20 mg oral TID         ALLERGIES        Aspirin, Ibuprofen, Iodine, Iodine and iodide containing products, Penicillins, Sulfa (sulfonamide antibiotics), Clindamycin, Hydrochlorothiazide, Oxycodone, Sulfamethoxazole-trimethoprim, and  Latex    FAMILY HISTORY        Family History   Problem Relation Age of Onset    Heart disease Biological Brother         stent    Breast cancer Niece     Cervical cancer Neg Hx     Uterine cancer Neg Hx     Colon cancer Neg Hx     Ovarian cancer Neg Hx      SOCIAL HISTORY        Social Determinants of Health     Tobacco Use: Medium Risk (2/1/2024)    Patient History     Smoking Tobacco Use: Former     Smokeless Tobacco Use: Never     Passive Exposure: Not on file   Alcohol Use: Not At Risk (4/16/2024)    AUDIT-C     Frequency of Alcohol Consumption: Never     Average Number of Drinks: Patient does not drink     Frequency of Binge Drinking: Never   Financial Resource Strain: Not on file   Food Insecurity: No Food Insecurity (4/16/2024)    Hunger Vital Sign     Worried About Running Out of Food in the Last Year: Never true     Ran Out of Food in the Last Year: Never true   Transportation Needs: No Transportation Needs (3/4/2024)    PRAPARE - Transportation     Lack of Transportation (Medical): No     Lack of Transportation (Non-Medical): No   Physical Activity: Not on file   Stress: Not on file   Social Connections: Not on file   Intimate Partner Violence: Not on file   Depression: Not at risk (4/16/2024)    Depression     Last PHQ-9: Not on file     Last PHQ-2: Flowsheet Data: 0   Housing Stability: Low Risk  (3/4/2024)    Housing Stability Vital Sign     Unable to Pay for Housing in the Last Year: No     Number of Places Lived in the Last Year: 1     Unstable Housing in the Last Year: No   Utilities: Not At Risk (3/4/2024)    [facility] Utilities     Threatened with loss of utilities: No   Interpersonal Safety: Not At Risk (4/16/2024)    Interpersonal Safety     SDOH IPS: Feels Safe at Home or Work/School: Not on file     SDOH IPS: Feels Threatened by Someone: Not on file     SDOH IPS: Does Anyone Try to Keep You From Having contact with Others or Doing Things Outside Your Home?: Not on file     SDOH IPS: Physical Signs  "of Abuse Present: Not on file     REVIEW OF SYSTEMS        A Full 10 point review of systems was obtained and is negative then otherwise stated in the HPI    PHYSICAL EXAMINATION      Vital Signs:   Visit Vitals  BP (!) 131/58 (BP Location: Right upper arm, Patient Position: Lying)   Pulse (!) 101   Temp 36.5 °C (97.7 °F) (Oral)   Resp 18   Ht 1.676 m (5' 6\")   Wt 53.5 kg (118 lb)   LMP  (LMP Unknown)   SpO2 100%   Breastfeeding No   BMI 19.05 kg/m²       I/O's:    Intake/Output Summary (Last 24 hours) at 4/17/2024 1648  Last data filed at 4/17/2024 0230  Gross per 24 hour   Intake --   Output 400 ml   Net -400 ml       General: The patient is in no acute distress.  Resting comfortably in bed.  HEENT: Mucous membranes are moist.  Sclera are anicteric.  Neck: Supple.  No cervical lymphadenopathy  Cardiovascular: S1 and S2 are present.  There are no murmurs.  Lungs: Decreased air entry at the bases   Abdomen: Soft, nontender and nondistended  Extremities: Cool and dry without edema  Neuro: Awake and alert.  Spontaneously moving all extremities    Diagnostic Data      Labs:    I have personally reviewed all pertinent patient laboratory results. Labs of note discussed below:    ABG Results         04/13/23     1949    Source Of Oxygen nc          BMP Results         04/17/24 04/16/24 04/09/24     0352 1527 1414     142 141    K 4.1 4.3 4.4    Cl 109 110 108    CO2 20 24 22    Glucose 72 86 82    BUN 24 23 19    Creatinine 1.0 0.8 0.8    Calcium 9.3 9.8 9.9    Anion Gap 12 8 11    EGFR >60.0 >60.0 >60.0           Comment for K at 1527 on 04/16/24: Results obtained on plasma. Plasma Potassium values may be up to 0.4 mEQ/L less than serum values. The differences may be greater for patients with high platelet or white cell counts.    Comment for EGFR at 0352 on 04/17/24: Calculation based on the Chronic Kidney Disease Epidemiology Collaboration (CKD-EPI) equation refit without adjustment for race.    Comment for EGFR " at 1527 on 04/16/24: Calculation based on the Chronic Kidney Disease Epidemiology Collaboration (CKD-EPI) equation refit without adjustment for race.    Comment for EGFR at 1414 on 04/09/24: Calculation based on the Chronic Kidney Disease Epidemiology Collaboration (CKD-EPI) equation refit without adjustment for race.          CBC Results         04/17/24 04/16/24 04/09/24     0352 1527 1414    WBC 8.73 10.69 11.32    RBC 3.73 4.04 4.29    HGB 10.3 11.5 12.1    HCT 34.3 37.8 39.7    MCV 92.0 93.6 92.5    MCH 27.6 28.5 28.2    MCHC 30.0 30.4 30.5     270 301           Comment for PLT at 1527 on 04/16/24: SPECIMEN QUALITY CHECKEDPLT CLUMPING SUSPECTED. PLT COUNT MAY BE SLIGHTLY HIGHER THAN REPORTED. IF CLINICALLY WARRANTED, SUGGEST COLLECTING SODIUM CITRATE TUBE (BLUE TOP) IN ADDITION TO EDTA TUBE FOR FOLLOW UP CBC TESTING.          Microbiology Results       ** No results found for the last 720 hours. **            Imaging:    I have reviewed all pertinent imaging which is discussed below:  1. No evidence of pulmonary embolism.   2. Stable cardiomegaly and large pericardial effusion. Cardiology consultation   suggested.   3. Emphysema with bilateral lower lobe groundglass opacity and bronchiectasis in   an NSIP pattern, which can be seen with scleroderma.  4. Stable 16 mm left lower lobe masslike density which may represent   atelectasis, lung cancer or lymphoma. When clinically appropriate PET/CT   suggested.   5. Stable prominence of the main pulmonary artery which can be seen with   pulmonary hypertension     ASSESSMENT AND RECOMMENDATIONS         62 y.o. 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          Cutaneous systemic  scleroderma with subsequent ILD and Group 1 pulmonary hypertension  Recent pericarditis with concern for recurrence  Left lower lobe nodule, stable in size however enlarged over the past year.  Previous PET scan with an SUV of 4.1.    -Needs an outpatient repeat PET CT scan for evaluation of left lower lobe nodule  -Reasonable to restart prednisone colchicine, would consider repeating a TTE, will defer to cardiology  -ILD and other lung findings remain stable from prior  -Check ambulatory pulse ox    Please page [phone] with any questions/concerns        Rayray Martinez MD  PGY-4  Pulmonary/Critical Care Fellow  4/17/2024

## 2024-04-17 NOTE — HOSPITAL COURSE
Arielle is a 63 y.o. female admitted on 4/16/2024 with Pericardial effusion [I31.39]  Acute chest pain [R07.9]  Pericarditis, unspecified chronicity, unspecified type [I31.9]. Principal problem is Acute chest pain.    Past Medical History  Arielle has a past medical history of De Quervain's tenosynovitis, Essential (primary) hypertension, Follicular carcinoma of thyroid (CMS/HCC), Gastro-esophageal reflux, Hand ulceration (CMS/HCC), Hyperlipidemia, unspecified, Interstitial lung disease (CMS/HCC), Leg ulcer (CMS/HCC), Lung nodule, Lupus (CMS/HCC), Osteomyelitis of hand (CMS/HCC), Osteoporosis, Pulmonary hypertension (CMS/HCC), Raynaud's disease, Reflux esophagitis, Scleroderma (CMS/HCC), and Screening mammogram for breast cancer (05/04/2021).    History of Present Illness  Pt presented initially for evaluation of worsening chest pain, likely in the setting of large pericardial effusion, CTA Chest upon admission revealing stable cardiomegaly and large pericardial effusion

## 2024-04-17 NOTE — PLAN OF CARE
Seen for [facility]ST, see RD note for recs  Problem: Adult Inpatient Plan of Care  Goal: Plan of Care Review  Outcome: Progressing

## 2024-04-18 PROBLEM — I25.10 CAD (CORONARY ARTERY DISEASE): Status: ACTIVE | Noted: 2024-04-18

## 2024-04-18 LAB
ANION GAP SERPL CALC-SCNC: 9 MEQ/L (ref 3–15)
BUN SERPL-MCNC: 29 MG/DL (ref 7–25)
CALCIUM SERPL-MCNC: 9.3 MG/DL (ref 8.6–10.3)
CHLORIDE SERPL-SCNC: 110 MEQ/L (ref 98–107)
CO2 SERPL-SCNC: 22 MEQ/L (ref 21–31)
CREAT SERPL-MCNC: 0.9 MG/DL (ref 0.6–1.2)
EGFRCR SERPLBLD CKD-EPI 2021: >60 ML/MIN/1.73M*2
ERYTHROCYTE [DISTWIDTH] IN BLOOD BY AUTOMATED COUNT: 18.4 % (ref 11.7–14.4)
GLUCOSE SERPL-MCNC: 96 MG/DL (ref 70–99)
HCT VFR BLD AUTO: 32.8 % (ref 35–45)
HGB BLD-MCNC: 9.8 G/DL (ref 11.8–15.7)
MCH RBC QN AUTO: 27.7 PG (ref 28–33.2)
MCHC RBC AUTO-ENTMCNC: 29.9 G/DL (ref 32.2–35.5)
MCV RBC AUTO: 92.7 FL (ref 83–98)
PDW BLD AUTO: 11.7 FL (ref 9.4–12.3)
PLATELET # BLD AUTO: 254 K/UL (ref 150–369)
POTASSIUM SERPL-SCNC: 4.6 MEQ/L (ref 3.5–5.1)
RBC # BLD AUTO: 3.54 M/UL (ref 3.93–5.22)
SODIUM SERPL-SCNC: 141 MEQ/L (ref 136–145)
WBC # BLD AUTO: 8.4 K/UL (ref 3.8–10.5)

## 2024-04-18 PROCEDURE — 85027 COMPLETE CBC AUTOMATED: CPT | Performed by: INTERNAL MEDICINE

## 2024-04-18 PROCEDURE — 97535 SELF CARE MNGMENT TRAINING: CPT | Mod: GO

## 2024-04-18 PROCEDURE — 36415 COLL VENOUS BLD VENIPUNCTURE: CPT | Performed by: INTERNAL MEDICINE

## 2024-04-18 PROCEDURE — 21400000 HC ROOM AND CARE CCU/INTERMEDIATE

## 2024-04-18 PROCEDURE — 97162 PT EVAL MOD COMPLEX 30 MIN: CPT | Mod: GP

## 2024-04-18 PROCEDURE — G0378 HOSPITAL OBSERVATION PER HR: HCPCS

## 2024-04-18 PROCEDURE — 99232 SBSQ HOSP IP/OBS MODERATE 35: CPT | Performed by: INTERNAL MEDICINE

## 2024-04-18 PROCEDURE — 97530 THERAPEUTIC ACTIVITIES: CPT | Mod: GO

## 2024-04-18 PROCEDURE — 63700000 HC SELF-ADMINISTRABLE DRUG: Performed by: STUDENT IN AN ORGANIZED HEALTH CARE EDUCATION/TRAINING PROGRAM

## 2024-04-18 PROCEDURE — 99233 SBSQ HOSP IP/OBS HIGH 50: CPT | Performed by: INTERNAL MEDICINE

## 2024-04-18 PROCEDURE — 63700000 HC SELF-ADMINISTRABLE DRUG: Performed by: INTERNAL MEDICINE

## 2024-04-18 PROCEDURE — 80048 BASIC METABOLIC PNL TOTAL CA: CPT | Performed by: INTERNAL MEDICINE

## 2024-04-18 RX ORDER — CHOLECALCIFEROL (VITAMIN D3) 25 MCG
1000 TABLET ORAL DAILY
Status: DISCONTINUED | OUTPATIENT
Start: 2024-04-18 | End: 2024-04-19 | Stop reason: HOSPADM

## 2024-04-18 RX ORDER — ATORVASTATIN CALCIUM 40 MG/1
40 TABLET, FILM COATED ORAL
Status: DISCONTINUED | OUTPATIENT
Start: 2024-04-18 | End: 2024-04-19 | Stop reason: HOSPADM

## 2024-04-18 RX ORDER — LANOLIN ALCOHOL/MO/W.PET/CERES
1000 CREAM (GRAM) TOPICAL DAILY
Status: DISCONTINUED | OUTPATIENT
Start: 2024-04-18 | End: 2024-04-19 | Stop reason: HOSPADM

## 2024-04-18 RX ORDER — ASCORBIC ACID 500 MG
250 TABLET ORAL DAILY
Status: DISCONTINUED | OUTPATIENT
Start: 2024-04-18 | End: 2024-04-19 | Stop reason: HOSPADM

## 2024-04-18 RX ADMIN — OXYCODONE HYDROCHLORIDE AND ACETAMINOPHEN 250 MG: 500 TABLET ORAL at 16:09

## 2024-04-18 RX ADMIN — SILDENAFIL 20 MG: 20 TABLET, FILM COATED ORAL at 13:30

## 2024-04-18 RX ADMIN — PANTOPRAZOLE SODIUM 40 MG: 40 TABLET, DELAYED RELEASE ORAL at 09:15

## 2024-04-18 RX ADMIN — PREDNISONE 20 MG: 20 TABLET ORAL at 09:15

## 2024-04-18 RX ADMIN — COLCHICINE 0.3 MG: 0.6 TABLET ORAL at 09:15

## 2024-04-18 RX ADMIN — ACETAMINOPHEN 650 MG: 325 TABLET ORAL at 13:30

## 2024-04-18 RX ADMIN — LEVOTHYROXINE SODIUM 100 MCG: 0.1 TABLET ORAL at 06:16

## 2024-04-18 RX ADMIN — ACETAMINOPHEN 650 MG: 325 TABLET ORAL at 03:58

## 2024-04-18 RX ADMIN — Medication 1000 UNITS: at 16:09

## 2024-04-18 RX ADMIN — SILDENAFIL 20 MG: 20 TABLET, FILM COATED ORAL at 09:15

## 2024-04-18 RX ADMIN — CYANOCOBALAMIN TAB 1000 MCG 1000 MCG: 1000 TAB at 16:09

## 2024-04-18 RX ADMIN — SILDENAFIL 20 MG: 20 TABLET, FILM COATED ORAL at 19:52

## 2024-04-18 RX ADMIN — ACETAMINOPHEN 650 MG: 325 TABLET ORAL at 19:55

## 2024-04-18 RX ADMIN — AMLODIPINE BESYLATE 10 MG: 10 TABLET ORAL at 09:15

## 2024-04-18 ASSESSMENT — COGNITIVE AND FUNCTIONAL STATUS - GENERAL
HELP NEEDED FOR BATHING: 2 - A LOT
EATING MEALS: 4 - NONE
MOVING TO AND FROM BED TO CHAIR: 3 - A LITTLE
AFFECT: WNL
WALKING IN HOSPITAL ROOM: 3 - A LITTLE
TOILETING: 3 - A LITTLE
MOVING TO AND FROM BED TO CHAIR: 3 - A LITTLE
CLIMB 3 TO 5 STEPS WITH RAILING: 3 - A LITTLE
AFFECT: WFL
DRESSING REGULAR UPPER BODY CLOTHING: 3 - A LITTLE
HELP NEEDED FOR PERSONAL GROOMING: 3 - A LITTLE
STANDING UP FROM CHAIR USING ARMS: 3 - A LITTLE
STANDING UP FROM CHAIR USING ARMS: 3 - A LITTLE
DRESSING REGULAR LOWER BODY CLOTHING: 2 - A LOT
WALKING IN HOSPITAL ROOM: 3 - A LITTLE
CLIMB 3 TO 5 STEPS WITH RAILING: 2 - A LOT

## 2024-04-18 ASSESSMENT — ENCOUNTER SYMPTOMS
FATIGUE: 1
FEVER: 0
PSYCHIATRIC NEGATIVE: 1
NECK PAIN: 1
GASTROINTESTINAL NEGATIVE: 1
SHORTNESS OF BREATH: 1
CHILLS: 0
NEUROLOGICAL NEGATIVE: 1
ARTHRALGIAS: 1

## 2024-04-18 NOTE — PROGRESS NOTES
"   Pulmonary Progress Note       SUBJECTIVE   Patient seen and examined at bedside. No acute events overnight.     Feels a little better today. Ongoing left sided chest pressure, worse with deep inspiration.      OBJECTIVE        Vital Signs:   Visit Vitals  /61 (Patient Position: Lying)   Pulse 84   Temp 36.8 °C (98.2 °F)   Resp 18   Ht 1.676 m (5' 6\")   Wt 53.5 kg (118 lb)   LMP  (LMP Unknown)   SpO2 100%   Breastfeeding No   BMI 19.05 kg/m²       I/O's:  No intake or output data in the 24 hours ending 04/18/24 1318    Medications:    Reviewed. Pertinent medications as below.     amLODIPine  10 mg oral q AM    atorvastatin  40 mg oral Daily (6p)    colchicine  0.3 mg oral Daily    levothyroxine  100 mcg oral Daily (6:30a)    macitentan  10 mg oral Daily    NON FORMULARY MEDICATION REQUEST  4 mg/kg contin. intravenous infusion Once    pantoprazole  40 mg oral Daily    predniSONE  20 mg oral Daily    sildenafiL (pulm.hypertension)  20 mg oral TID    tocilizumab (ACTEMRA) 200 mg in sodium chloride 0.9 % 100 mL IVPB  200 mg intravenous Once           PHYSICAL EXAMINATION        General: The patient is in no acute distress.  Resting comfortably in bed.  HEENT: Mucous membranes are moist.  Sclera are anicteric.  Neck: Supple.  No cervical lymphadenopathy  Cardiovascular: S1 and S2 are present.  There are no murmurs.  Lungs: Decreased air entry at the bases   Abdomen: Soft, nontender and nondistended  Extremities: Cool and dry without edema  Neuro: Awake and alert.  Spontaneously moving all extremities       Diagnostic Data      Labs:    I have personally reviewed all pertinent patient laboratory results. Labs of note discussed below:    ABG Results         04/13/23     1949    Source Of Oxygen nc          CMP Results         04/18/24 04/17/24 04/16/24     0420 0352 1527     141 142    K 4.6 4.1 4.3    Cl 110 109 110    CO2 22 20 24    Glucose 96 72 86    BUN 29 24 23    Creatinine 0.9 1.0 0.8    Calcium 9.3 " 9.3 9.8    Anion Gap 9 12 8    AST -- -- 16    ALT -- -- 8    Albumin -- -- 4.0    EGFR >60.0 >60.0 >60.0           Comment for K at 1527 on 04/16/24: Results obtained on plasma. Plasma Potassium values may be up to 0.4 mEQ/L less than serum values. The differences may be greater for patients with high platelet or white cell counts.    Comment for EGFR at 0420 on 04/18/24: Calculation based on the Chronic Kidney Disease Epidemiology Collaboration (CKD-EPI) equation refit without adjustment for race.    Comment for EGFR at 0352 on 04/17/24: Calculation based on the Chronic Kidney Disease Epidemiology Collaboration (CKD-EPI) equation refit without adjustment for race.    Comment for EGFR at 1527 on 04/16/24: Calculation based on the Chronic Kidney Disease Epidemiology Collaboration (CKD-EPI) equation refit without adjustment for race.          CBC Results         04/18/24 04/17/24 04/16/24     0420 0352 1527    WBC 8.40 8.73 10.69    RBC 3.54 3.73 4.04    HGB 9.8 10.3 11.5    HCT 32.8 34.3 37.8    MCV 92.7 92.0 93.6    MCH 27.7 27.6 28.5    MCHC 29.9 30.0 30.4     235 270           Comment for PLT at 1527 on 04/16/24: SPECIMEN QUALITY CHECKEDPLT CLUMPING SUSPECTED. PLT COUNT MAY BE SLIGHTLY HIGHER THAN REPORTED. IF CLINICALLY WARRANTED, SUGGEST COLLECTING SODIUM CITRATE TUBE (BLUE TOP) IN ADDITION TO EDTA TUBE FOR FOLLOW UP CBC TESTING.          Microbiology Results       ** No results found for the last 720 hours. **          Imaging:    I have reviewed all pertinent imaging.      ASSESSMENT AND PLAN      62 y.o. 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           Cutaneous systemic scleroderma with subsequent ILD and Group 1  pulmonary hypertension  Recent pericarditis with concern for recurrence  Left lower lobe nodule, stable in size however enlarged over the past year.  Previous PET scan with an SUV of 4.1.     -c/w colchicine and prednisone   -planned to start Tocilizumab inpatient per rheumatology, would likely benefit from a limited TTE prior to start, will defer to cardiology    -Needs an outpatient repeat PET CT scan for evaluation of left lower lobe nodule  -ILD and other lung findings remain stable from prior  -Check ambulatory pulse ox     Please page [phone] with any questions/concerns        Rayray Martinez MD  PGY-4  Pulmonary/Critical Care Fellow  4/18/2024

## 2024-04-18 NOTE — NURSING NOTE
Patient complained of pleuritic pain as well as neck pain. PRN tylenol given and heating pad provided. Started on actemra; infusion administered by oncology nurse. Stand pivot to bedside commode. Refused Lipitor this evening.

## 2024-04-18 NOTE — PROGRESS NOTES
Occupational Therapy -  Daily Treatment/Progress Note     Patient: Arielle GUAJARDO Isidro  [facility] [place] [address] [facility]  MRN: [id number]  Today's date: 4/18/2024    HISTORY OF PRESENT ILLNESS     Arielle is a 63 y.o. female admitted on 4/16/2024 with Pericardial effusion [I31.39]  Acute chest pain [R07.9]  Pericarditis, unspecified chronicity, unspecified type [I31.9]. Principal problem is Acute chest pain.    Past Medical History  Arielle has a past medical history of De Quervain's tenosynovitis, Essential (primary) hypertension, Follicular carcinoma of thyroid (CMS/HCC), Gastro-esophageal reflux, Hand ulceration (CMS/HCC), Hyperlipidemia, unspecified, Interstitial lung disease (CMS/HCC), Leg ulcer (CMS/HCC), Lung nodule, Lupus (CMS/HCC), Osteomyelitis of hand (CMS/HCC), Osteoporosis, Pulmonary hypertension (CMS/HCC), Raynaud's disease, Reflux esophagitis, Scleroderma (CMS/HCC), and Screening mammogram for breast cancer (05/04/2021).    History of Present Illness  Pt presented initially for evaluation of worsening chest pain, likely in the setting of large pericardial effusion, CTA Chest upon admission revealing stable cardiomegaly and large pericardial effusion    PRIOR LEVEL OF FUNCTION AND LIVING ENVIRONMENT     Prior Level of Function      Flowsheet Row Most Recent Value   Dominant Hand right   Ambulation assistive equipment   Transferring assistive equipment   Toileting independent   Bathing assistive equipment and person   Dressing assistive person   Eating independent   IADLs assistive person   Driving/Transportation friends/family   Prior Level of Function Comment A 24/7, receives assist for all BADLs and IADLs   Assistive Device Currently Used at Home walker, front-wheeled, stair glide          Prior Living Environment      Flowsheet Row Most Recent Value   People in Home alone   Current Living Arrangements home   Home Accessibility stairs to enter home (Group), stairs within  home (Group)   Living Environment Comment pt lives alone in 2STH with 4STE, 24/7 caregiver            VITALS AND PAIN     OT Vitals      Date/Time Pulse SpO2 Pt Activity BP BP Method Pt Position [facility]   04/18/24 1200 85 100 % At rest 132/69 Automatic Sitting KB          OT Pain      Date/Time Pain Type Side/Orientation Location Rating: Rest Rating: Activity Interventions [facility]   04/18/24 1200 Pain Assessment left shoulder 4 - moderate pain 6 - moderate-severe pain care clustered KB             Objective   OBJECTIVE     Start time:  1145  End time:  1235  Session Length: 50 min  Mode of Treatment: individual therapy, occupational therapy    General Observations  Patient received upright, in bed. She was agreeable to therapy, no issues or concerns identified by nurse prior to session. no acute distress    Precautions: fall              OT Eval and Treat - 04/18/24 1145          Cognition    Orientation Status oriented x 3     Affect/Mental Status WFL     Follows Commands WFL     Cognitive Function WFL     Comment, Cognition pt pleasant and cooperative, recpetive to cues and education provided throughout session, pt with improved insight noted this session        Bed Mobility    Bed Mobility Activities supine to sit;sit to supine     [place] supervision     Safety/Cues increased time to complete     Assistive Device bed rails;head of bed elevated        Mobility Belt    Mobility Belt Used for All Out of Bed Activity no     Reason Mobility Belt Not Used other (see comments)     Reason Mobility Belt Not Used skin integirty        Sit/Stand Transfer    Surface edge of bed     [place] 1 person assist;minimum assist (75% or more patient effort)     Safety/Cues verbal cues;increased time to complete;tactile cues     Assistive Device walker, front-wheeled        Toilet Transfer    Transfer Technique sit/stand     [place] close supervision     Safety/Cues increased time to complete;verbal cues;tactile cues;hand  placement     Assistive Device grab bars/safety frame;walker, front-wheeled        Functional Mobility    Distance in room/bathroom     Functional Mobility [place] supervision;1 person assist     Safety/Cues minimal;tactile cues;proper use of assistive device;verbal cues;increased time to complete     Assistive Device walker, front-wheeled     Functional Mobility Comments fair ability to grasp and control RW        Bathing    Tasks chest;left upper leg;right upper leg;left lower leg, including foot;right lower leg, including foot;abdomen;front perineal area;buttocks;right arm;left arm     [place] moderate assist (50-74% patient effort);1 person assist     Safety/Cues increased time to complete;compensatory techniques;energy conservation;tactile cues;verbal cues     Position unsupported sitting     Setup Assistance adaptive equipment setup;adjust water temperature/flow;open containers;obtain supplies     Adaptive Equipment none     Comment pt seated on toilet for bathing, motivated to complete as much indepedently as possible, benefits from cues for energy conservation        Upper Body Dressing    Tasks don;hospital gown     [place] minimum assist (75% or more patient effort);1 person assist     Safety/Cues increased time to complete     Position unsupported sitting     Adaptive Equipment none        Lower Body Dressing    Tasks don;underwear;doff     [place] minimum assist (75% or more patient effort)     Safety/Cues increased time to complete;minimal;compensatory techniques;verbal cues     Position unsupported sitting     Adaptive Equipment none     Comment pt requried asist for sock donning, but able to doff and don underwear at setup with increased time        Grooming    Tasks washes, rinses and dries face;oral care (brushing teeth, cleaning dentures)     [place] set up     Position unsupported sitting     Setup Assistance adaptive equipment set-up;obtain supplies;open containers      Adaptive Equipment none        Toileting    Tasks adjust/manage clothing;perform bladder hygiene;perform bowel hygiene     [place] supervision;1 person assist     Safety/Cues energy conservation     Position unsupported sitting;unsupported standing     Setup Assistance adaptive equipment setup;obtain supplies     Adaptive Equipment grab bar/safety frame        Balance    Static Sitting Balance WFL     Dynamic Sitting Balance WFL     Sit to Stand Dynamic Balance WFL     Static Standing Balance WFL     Dynamic Standing Balance mild impairment;supported        Impairments/Safety Issues    Impairments Affecting Function balance;endurance/activity tolerance;shortness of breath;range of motion (ROM);coordination;pain     Cognitive Impairments, Safety/Performance insight into deficits/self-awareness     Functional Endurance decreased from PLOF, pt ambulating throughout home and doctors appointments     Safety Issues Affecting Function insight into deficits/self-awareness     Comment, Safety Issues/Impairments evolving insights, improved from previous session                                       Session Outcome  Patient supine, in bed, upright at end of session, all needs met, call light in reach, personal items in reach. Nursing notified about change in vital signs, patient's performance, patient's position, and patient's response to therapy/activity.    AM-PAC™ - ADL (Current Function)     Putting on/taking off regular lower body clothing 2 - A Lot   Bathing 2 - A Lot   Toileting 3 - A Little   Putting on/taking off regular upper body clothing 3 - A Little   Help for taking care of personal grooming 3 - A Little   Eating meals 4 - None   AM-PAC™ ADL Score 17      ASSESSMENT AND PLAN     Progress Summary  OT treatment session completed. Pt presents with functional deficits which impact ADL performance including pain, and decreased activity tolerance, balance, strenght, and coordination. Pt completed all functional  "mobility and transfers at supervision to Min A with RW and increased time. Pt completed various bathing and dressing ADLs while seated on toilet, unsupported, with good balance noted. Pt required setup to Mod A for bathing and dressing ADLs with assist for opening all containers in setting of impaired FMC. Pt demonstrates improved insight into deficits and current functional status. pt would beneift from continued OT services to maximize safety and functional independence. Recommend discharge to home with resumption of 24/7 assist and homecare OT services when medically appropriate.    Patient/Family Therapy Goal Statement: \"go home\"    OT Plan      Flowsheet Row Most Recent Value   Rehab Potential good, to achieve stated therapy goals at 04/18/2024 1145   Therapy Frequency 4 times/wk at 04/18/2024 1145   Planned Therapy Interventions activity tolerance training, occupation/activity based interventions, IADL retraining, patient/caregiver education/training, strengthening exercise, adaptive equipment training, BADL retraining, functional balance retraining, ROM/therapeutic exercise at 04/18/2024 1145            OT Discharge Recommendations      Flowsheet Row Most Recent Value   OT Recommended Discharge Disposition home with assistance, home with home health at 04/18/2024 1145   Anticipated Equipment Needs if Discharged Home (OT) other (see comments) at 04/18/2024 1145                 OT Goals      Flowsheet Row Most Recent Value   Transfer Goal 1    Activity/Assistive Device toilet at 04/17/2024 0951   [place] minimum assist (75% or more patient effort) at 04/17/2024 0951   Time Frame short-term goal (STG) at 04/17/2024 0951   Bathing Goal 1    Activity/Assistive Device bathing skills, all at 04/17/2024 0951   [place] minimum assist (75% or more patient effort) at 04/17/2024 0951   Time Frame short-term goal (STG) at 04/17/2024 0951   Dressing Goal 1    Activity/Adaptive Equipment upper body dressing at " 04/17/2024 0951   [place] modified independence at 04/17/2024 0951   Time Frame short-term goal (STG) at 04/17/2024 0951   Dressing Goal 2    Activity/Adaptive Equipment lower body dressing at 04/17/2024 0951   [place] minimum assist (75% or more patient effort) at 04/17/2024 0951   Time Frame short-term goal (STG) at 04/17/2024 0951   Toileting Goal 1    Activity/Assistive Device toileting skills, all at 04/17/2024 0951   [place] minimum assist (75% or more patient effort) at 04/17/2024 0951   Time Frame short-term goal (STG) at 04/17/2024 0951           No

## 2024-04-18 NOTE — PROGRESS NOTES
Physical Therapy -  Initial Evaluation     Patient: Arielle GUAJARDO Isidro  [facility] [place] [address] [facility]  MRN: [id number]  Today's date: 4/18/2024    HISTORY OF PRESENT ILLNESS     Arielle is a 63 y.o. female admitted on 4/16/2024 with Pericardial effusion [I31.39]  Acute chest pain [R07.9]  Pericarditis, unspecified chronicity, unspecified type [I31.9]. Principal problem is Acute chest pain.    Past Medical History  Arielle has a past medical history of De Quervain's tenosynovitis, Essential (primary) hypertension, Follicular carcinoma of thyroid (CMS/HCC), Gastro-esophageal reflux, Hand ulceration (CMS/HCC), Hyperlipidemia, unspecified, Interstitial lung disease (CMS/HCC), Leg ulcer (CMS/HCC), Lung nodule, Lupus (CMS/HCC), Osteomyelitis of hand (CMS/HCC), Osteoporosis, Pulmonary hypertension (CMS/HCC), Raynaud's disease, Reflux esophagitis, Scleroderma (CMS/HCC), and Screening mammogram for breast cancer (05/04/2021).    History of Present Illness  Pt presented initially for evaluation of worsening chest pain, likely in the setting of large pericardial effusion, CTA Chest upon admission revealing stable cardiomegaly and large pericardial effusion    PRIOR LEVEL OF FUNCTION AND LIVING ENVIRONMENT     Prior Level of Function      Flowsheet Row Most Recent Value   Dominant Hand right   Ambulation assistive equipment   Transferring assistive equipment   Toileting independent   Bathing assistive equipment and person   Dressing assistive person   Eating independent   IADLs assistive person   Driving/Transportation friends/family   Prior Level of Function Comment [facility] 24/7, receives assist for all BADLs and IADLs   Assistive Device Currently Used at Home walker, front-wheeled, stair glide          Prior Living Environment      Flowsheet Row Most Recent Value   People in Home alone   Current Living Arrangements home   Home Accessibility stairs to enter home (Group), stairs within home (Group)    Living Environment Comment pt lives alone in 2STH with 4STE, 24/7 caregiver          VITALS AND PAIN     PT Vitals      Date/Time Pulse SpO2 Pt Activity O2 Therapy BP Who   04/18/24 1010 70 99 % At rest None (Room air) 141/86 CECY          PT Pain      Date/Time Pain Type Rating: Rest Rating: Activity [facility]   04/18/24 1010 Pain Assessment 0 0 CECY             Objective   OBJECTIVE     Start time:  1009  End time:  1025  Session Length: 16 min  Mode of Treatment: individual therapy, physical therapy    General Observations  Patient received supine. She was agreeable to therapy, no issues or concerns identified by nurse prior to session.      Precautions:                PT Eval and Treat - 04/18/24 1009          Cognition    Orientation Status oriented x 3     Affect/Mental Status WNL        Vision Assessment/Intervention    Vision Assessment WNL        Hearing Assessment    Hearing Status WNL        Sensory Assessment    Sensory Assessment sensation intact, lower extremities        Lower Extremity Assessment    LE Assessment ROM and Strength WFL        Bed Mobility    Bed Mobility Activities supine to sit;sit to supine     [place] supervision     Safety/Cues increased time to complete;hand placement     Assistive Device bed rails;head of bed elevated        Mobility Belt    Mobility Belt Used for All Out of Bed Activity no        Sit/Stand Transfer    Surface edge of bed     [place] minimum assist (75% or more patient effort);1 person assist     Safety/Cues verbal cues;hand placement     Assistive Device walker, front-wheeled        Gait Training    [place], Gait minimum assist (75% or more patient effort);1 person assist     Safety/Cues increased time to complete     Assistive Device walker, front-wheeled     Distance in Feet 15 feet     Pattern step-to     Deviations/Abnormal Patterns ashly decreased;gait speed decreased        Balance    Static Sitting Balance WFL     Dynamic Sitting Balance WFL      Sit to Stand Dynamic Balance WFL     Static Standing Balance mild impairment     Dynamic Standing Balance mild impairment        Impairments/Safety Issues    Impairments Affecting Function balance;endurance/activity tolerance;shortness of breath;range of motion (ROM);coordination;pain     Cognitive Impairments, Safety/Performance insight into deficits/self-awareness                                    Education Documentation  Call Light Use, taught by Scar Nguyen PT at 4/18/2024  2:23 PM.  Learner: Patient  Readiness: Eager  Method: Explanation  Response: Verbalizes Understanding  Comment: instructed pt to use the RN remote for A.        Session Outcome  Patient supine at end of session, bed alarm on, all needs met, call light in reach, personal items in reach. Nursing notified about patient's performance, patient's position, and patient's response to therapy/activity.    AM-PAC™ - Mobility (Current Function)     Turning form your back to your side while in flat bed without using bedrails 3 - A Little   Moving from lying on your back to sitting on the side of a flat bed without using bedrails 3 - A Little   Moving to and from a bed to a chair 3 - A Little   Standing up from a chair using your arms 3 - A Little   To walk in a hospital room 3 - A Little   Climbing 3-5 steps with a railing 3 - A Little   AM-PAC™ Mobility Score 18      ASSESSMENT AND PLAN     Progress Summary  Pt managing supine <> sit without PT A, but requires A for sit to stand and ambulation. Ambulated a short distance without any episodes of LOB or unsteadiness. Pt has 24/7 care and appears safe to return home.    Patient/Family Therapy Goals Statement: return home    PT Plan      Flowsheet Row Most Recent Value   Rehab Potential good, to achieve stated therapy goals at 04/18/2024 1009   Therapy Frequency 4 times/wk at 04/18/2024 1009   Planned Therapy Interventions balance training, gait training, patient/family education, transfer training,  stair training at 04/18/2024 1009            PT Discharge Recommendations      Flowsheet Row Most Recent Value   PT Recommended Discharge Disposition home with home health at 04/18/2024 1009   Anticipated Equipment Needs if Discharged Home (PT) none at 04/18/2024 1009                 PT Goals      Flowsheet Row Most Recent Value   Transfer Goal 1    Activity/Assistive Device all transfers at 04/18/2024 1009   [place] supervision required at 04/18/2024 1009   Time Frame 3-5 days at 04/18/2024 1009   Progress/Outcome goal ongoing at 04/18/2024 1009   Gait Training Goal 1    Activity/Assistive Device gait (walking locomotion) at 04/18/2024 1009   [place] supervision required at 04/18/2024 1009   Distance 50 at 04/18/2024 1009   Time Frame 3-5 days at 04/18/2024 1009   Progress/Outcome goal ongoing at 04/18/2024 1009   Stairs Goal 1    Activity/Assistive Device stairs, all skills at 04/18/2024 1009   [place] supervision required at 04/18/2024 1009   Number of Stairs 4 at 04/18/2024 1009   Time Frame 3-5 days at 04/18/2024 1009   Progress/Outcome goal ongoing at 04/18/2024 1009

## 2024-04-18 NOTE — PLAN OF CARE
Care Coordination Discharge Plan Note     Discharge Needs Assessment  Concerns to be Addressed: discharge planning, care coordination/care conferences  Current Discharge Risk: lives alone    Anticipated Discharge Plan  Anticipated Discharge Disposition: home with assistance, home with home health  Type of Home Care Services: home health aide, home PT, home OT, nursing      Concerns Comments: Per rounds possible dc tomorrow. Rheum following, patient to start IV actemra infusion. Patient with home health rec from PT and is open with [facility], return referral will be sent via [employer]. Patient requesting rollatorEZEKIEL advised it may not be covered by insurance but order submitted via [facility]. Monitoring O2 needs.    Discharge Plan:   Disposition/Destination: Home Health Care - Other / Home  Discharge Facility:    Community Resources:      Discharge Transportation:  Is Out of Hospital DNR needed at Discharge: no  Does patient need discharge transport?

## 2024-04-18 NOTE — ASSESSMENT & PLAN NOTE
-Has known history  -Cardiology consulted above, recommending initiation of plavix/statin (patient is allergic to ASA)  -I discussed extensively with patient, at this time she declines starting plavix as she is concerned about bleeding. Understands risk of MI/CVA and PVD. Is agreeable to starting statin, have started atorvastatin 40mg qD

## 2024-04-18 NOTE — CONSULTS
Rheumatology Consult Note    Subjective     Arielle Felix is a 63 y.o. female who was admitted for Pericardial effusion [I31.39]  Acute chest pain [R07.9]  Pericarditis, unspecified chronicity, unspecified type [I31.9].      63 year old female with with hx of cutaneous systemic scleroderma (dx 1998), pulmonary hypertension (WHO Group 1, on mecitentan and sildenafil), ILD, Raynaud's disease, PE (3/2023, not on AC due to epistaxis and hemoptysis), GERD and HTN hospitalized for pleuritic chest pain. Rheumatology consulted for scleroderma management.    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   - She was on MMF and myfortic in the past - developed severe GI intolerance. She was discussing use of Actemra but was not on it yet.   - She is currently on amlodipine 10mg in the morning and 10mg in the evening if she needs it. Also on sildenafil.     Patient admitted for pleuritic chest pain - labs with elevated troponin, leukocytosis and elevated BNP. Imaging with large pericardial effusion. She was scheduled for infusion of IV actemra for 4/29.    Past Medical History:   Diagnosis Date   • De Quervain's tenosynovitis    • Essential (primary) hypertension    • Follicular carcinoma of thyroid (CMS/HCC)    • Gastro-esophageal reflux    • Hand ulceration (CMS/HCC)    • Hyperlipidemia, unspecified    • Interstitial lung disease (CMS/HCC)    • Leg ulcer (CMS/HCC)    • Lung nodule    • Lupus (CMS/HCC)    • Osteomyelitis of hand (CMS/HCC)    • Osteoporosis    • Pulmonary hypertension (CMS/HCC)    • Raynaud's disease     Severe   • Reflux esophagitis    • Scleroderma (CMS/HCC)    • Screening mammogram for breast cancer 05/04/2021    BI-RADS category: 1 - Negative (care everywhere @ [facility])       Past Surgical History:   Procedure Laterality Date   • CARDIAC  CATHETERIZATION     • COLONOSCOPY     • HERNIA REPAIR         Aspirin, Ibuprofen, Iodine, Iodine and iodide containing products, Penicillins, Sulfa (sulfonamide antibiotics), Clindamycin, Hydrochlorothiazide, Oxycodone, Sulfamethoxazole-trimethoprim, and Latex    Current Facility-Administered Medications   Medication Dose Route Frequency Provider Last Rate Last Admin   • acetaminophen (TYLENOL) tablet 650 mg  650 mg oral q6h PRN Capo Estevez MD   650 mg at 04/18/24 0358   • amLODIPine (NORVASC) tablet 10 mg  10 mg oral q AM Capo Estevez MD   10 mg at 04/17/24 0802   • colchicine (COLCRYS) tablet 0.3 mg  0.3 mg oral Daily Capo Estevez MD   0.3 mg at 04/17/24 0802   • glucose chewable tablet 16-32 g of dextrose  16-32 g of dextrose oral PRN Capo Estevez MD        Or   • dextrose 40 % oral gel 15-30 g of dextrose  15-30 g of dextrose oral PRN Capo Estevez MD        Or   • glucagon (GLUCAGEN) injection 1 mg  1 mg intramuscular PRN Capo Estevez MD        Or   • dextrose 50 % in water (D50) injection 12.5 g  25 mL intravenous PRN Capo Estevez MD       • docusate sodium (COLACE) capsule 100 mg  100 mg oral Daily PRN Capo Estevez MD       • levothyroxine (SYNTHROID) tablet 100 mcg  100 mcg oral Daily (6:30a) Capo Estevez MD   100 mcg at 04/18/24 0616   • macitentan (OPSUMIT) tablet 10 mg  10 mg oral Daily Teri Castañeda MD   10 mg at 04/17/24 0802   • meclizine (ANTIVERT) tablet 25 mg  25 mg oral Daily PRN Capo Estevez MD       • ondansetron ODT (ZOFRAN-ODT) disintegrating tablet 4 mg  4 mg oral q8h PRN Teri Castañeda MD        Or   • ondansetron (ZOFRAN) injection 4 mg  4 mg intravenous q8h PRN Teri Castañeda MD       • pantoprazole (PROTONIX) tablet,delayed release (DR/EC) 40 mg  40 mg oral Daily Gerard Quach DO   40 mg at 04/17/24 1425   • predniSONE (DELTASONE) tablet 20 mg  20 mg oral Daily Gerard Quach DO   20 mg at 04/17/24 1425   • sildenafiL (pulm.hypertension) (REVATIO) tablet 20 mg  20 mg oral TID Capo Estevez MD   20  mg at 24 2019       Social History     Socioeconomic History   • Marital status:    Tobacco Use   • Smoking status: Former     Types: Cigarettes     Quit date: 9/15/2005     Years since quittin.6   • Smokeless tobacco: Never   • Tobacco comments:     Would smoke 1/2 of 1/2 PPD, quit in    Vaping Use   • Vaping Use: Never used   Substance and Sexual Activity   • Alcohol use: Never   • Drug use: Never   • Sexual activity: Not Currently     Birth control/protection: Post-menopausal     Social Determinants of Health     Food Insecurity: No Food Insecurity (2024)    Hunger Vital Sign    • Worried About Running Out of Food in the Last Year: Never true    • Ran Out of Food in the Last Year: Never true   Transportation Needs: No Transportation Needs (3/4/2024)    PRAPARE - Transportation    • Lack of Transportation (Medical): No    • Lack of Transportation (Non-Medical): No   Housing Stability: Low Risk  (3/4/2024)    Housing Stability Vital Sign    • Unable to Pay for Housing in the Last Year: No    • Number of Places Lived in the Last Year: 1    • Unstable Housing in the Last Year: No       Family History   Problem Relation Age of Onset   • Heart disease Biological Brother         stent   • Breast cancer Niece    • Cervical cancer Neg Hx    • Uterine cancer Neg Hx    • Colon cancer Neg Hx    • Ovarian cancer Neg Hx        Review of Systems   Constitutional: Positive for fatigue. Negative for chills and fever.   Respiratory: Positive for shortness of breath.    Cardiovascular: Positive for chest pain.   Gastrointestinal: Negative.    Genitourinary: Negative.    Musculoskeletal: Positive for arthralgias and neck pain.   Neurological: Negative.    Psychiatric/Behavioral: Negative.        Vital signs in last 24 hours:  Temp:  [36.3 °C (97.4 °F)-36.7 °C (98 °F)] 36.7 °C (98 °F)  Heart Rate:  [] 70  Resp:  [18] 18  BP: (111-146)/(57-73) 141/68    Objective     Physical Exam  Physical  Exam  HENT:      Nose: Nose normal.      Mouth/Throat:      Mouth: Mucous membranes are dry.   Cardiovascular:      Rate and Rhythm: Tachycardia present.   Pulmonary:      Effort: Pulmonary effort is normal.   Abdominal:      Palpations: Abdomen is soft.   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   Skin:     Findings: Lesion and rash present.   Neurological:      Mental Status: She is alert. Mental status is at baseline.       Labs  I have reviewed the patient's pertinent labs.  CBC Results       04/18/24 04/17/24 04/16/24     0420 0352 1527    WBC 8.40 8.73 10.69    RBC 3.54 3.73 4.04    HGB 9.8 10.3 11.5    HCT 32.8 34.3 37.8    MCV 92.7 92.0 93.6    MCH 27.7 27.6 28.5    MCHC 29.9 30.0 30.4     235 270         Comment for PLT at 1527 on 04/16/24: SPECIMEN QUALITY CHECKEDPLT CLUMPING SUSPECTED. PLT COUNT MAY BE SLIGHTLY HIGHER THAN REPORTED. IF CLINICALLY WARRANTED, SUGGEST COLLECTING SODIUM CITRATE TUBE (BLUE TOP) IN ADDITION TO EDTA TUBE FOR FOLLOW UP CBC TESTING.        BMP Results       04/18/24 04/17/24 04/16/24     0420 0352 1527     141 142    K 4.6 4.1 4.3    Cl 110 109 110    CO2 22 20 24    Glucose 96 72 86    BUN 29 24 23    Creatinine 0.9 1.0 0.8    Calcium 9.3 9.3 9.8    Anion Gap 9 12 8    EGFR >60.0 >60.0 >60.0         Comment for K at 1527 on 04/16/24: Results obtained on plasma. Plasma Potassium values may be up to 0.4 mEQ/L less than serum values. The differences may be greater for patients with high platelet or white cell counts.    Comment for EGFR at 0420 on 04/18/24: Calculation based on the Chronic Kidney Disease Epidemiology Collaboration (CKD-EPI) equation refit without adjustment for race.    Comment for EGFR at 0352 on 04/17/24: Calculation based on the Chronic Kidney Disease Epidemiology Collaboration (CKD-EPI) equation refit without adjustment for race.    Comment for EGFR at 1527 on  04/16/24: Calculation based on the Chronic Kidney Disease Epidemiology Collaboration (CKD-EPI) equation refit without adjustment for race.          A/P:  Patient with long-standing history of diffuse systemic sclerosis complicated by pulmonary hypertension and interstitial lung disease.  She unfortunately has progressive skin involvement with thickening leading to contractures of the digits as well as acroosteolysis and partial amputation due to her Raynaud's phenomenon.  I discussed with the patient that given the systemic involvement of the heart and lungs as well as progressive skin disease, would favor use of immunosuppression.  Patient was previously tried on both CellCept and Myfortic, both stopped due to significant GI side effects. Was planning to start IV actemra as outpatient and will plan to get first infusion here in hospital.     - Start IV actemra - will plan for first infusion here in the hospital   - prednisone and colchicine per cardiology   - agree with need for outpatient PET CT - patient believes she had PET CT several months ago at [facility],   4/18/2024

## 2024-04-18 NOTE — PROGRESS NOTES
Hospital Medicine Service -  Daily Progress Note       SUBJECTIVE   Interval History: Patient was seen and examined at the bedside. Doing well this morning. Endorses continued chest pressure that is unchanged. Understands current plan of care.      OBJECTIVE      Vital signs in last 24 hours:  Temp:  [36.4 °C (97.5 °F)-36.8 °C (98.2 °F)] 36.8 °C (98.2 °F)  Heart Rate:  [] 85  Resp:  [18] 18  BP: (121-146)/(58-86) 132/69  No intake or output data in the 24 hours ending 04/18/24 1502    PHYSICAL EXAMINATION      Physical Exam  General: Chronically Ill Appearing, NAD, Conversant, AAOx3  HEENT: PERRL, EOMI, MMM  Cardiovascular: Decreased heart sounds, S1/S2, No murmur appreciated  Respiratory: CTA B/L  Abdomen: Soft and non-tender, normoactive BS's  Extremities: No LE edema noted  Skin: Chronic scarring noted on face/extremities/hands  Psych: Appropriate mood and affect    LINES, CATHETERS, DRAINS, AIRWAYS, AND WOUNDS   Lines, Drains, and Airways:  Wounds (agree with documentation and present on admission):  Peripheral IV (Adult) 04/16/24 Anterior;Right Forearm (Active)   Number of days: 2       Wound Other (comment) Anterior;Left Toe (Great) (Active)   Number of days: 45       Wound Pressure Injury Anterior;Left Ankle (Active)   Number of days: 45       Wound Pressure Injury Anterior;Right Ankle (Active)   Number of days: 45       Wound Other (comment) Anterior;Right Toe (2nd) (Active)   Number of days: 45         Comments:      LABS / IMAGING / TELE      Labs  Reviewed, Hgb 9.8, Cr 0.9    Imaging  I have independently reviewed the pertinent imaging from the last 24 hrs.    Telemetry  NSR    ASSESSMENT AND PLAN      * Acute chest pain  Assessment & Plan  -63-year-old female with above-mentioned medical problems who presented initially for evaluation of worsening chest pain, likely in the setting of large pericardial effusion  -Telemetry without arrhythmias, HST 28->30, CRP WNL,    -Per chart review,  had coronary CT last month (03/2024) which revealed two-vessel coronary artery disease as above that is moderate in severity. ECHO earlier this month revealing EF: 60%. No regional wall motion abnormalities .   -Cardiology evaluated, believe etiology of her pain is inflammatory in nature 2/2 to her auto-immune disease, recommending resuming steroids   -Currently patient is refusing plavix/statin, will discuss with patient further   -Started prednisone 20mg qD on 4/17 with plans for slow taper  -Rheumatology consulted, plan for dose of IV tocilizumab today, appreciate assistance with this case     Lung mass  Assessment & Plan  -CT scan showed lung mass 16 mm in size in the left lower lobe, she does have history of tobacco abuse in the past  -Pulmonology consulted, will need outpatient PET CT, will order at discharge     Pericardial effusion  Assessment & Plan  -Known history  -CTA Chest upon admission revealing stable cardiomegaly and large pericardial effusion   -Cardiology consulted and following, recommending repeat ECHO in 1 month, continuing colchicine, and resuming steroids  -Have started prednisone 20mg qD as above, further added PPI for GI ppx     CAD (coronary artery disease)  Assessment & Plan  -Has known history  -Cardiology consulted above, recommending initiation of plavix/statin (patient is allergic to ASA)  -I discussed extensively with patient, at this time she declines starting plavix as she is concerned about bleeding. Understands risk of MI/CVA and PVD. Is agreeable to starting statin, have started atorvastatin 40mg qD     Hypothyroid  Assessment & Plan  Chronic history of hypothyroidism, continue levothyroxine    Scleroderma (CMS/HCC)  Assessment & Plan  -Chronic history of Raynaud's phenomenon/scleroderma  -Continue tocilizumab on discharge  -Rheumatology consulted as noted above     Interstitial lung disease (CMS/HCC)  Assessment & Plan  As above    Pulmonary hypertension (CMS/HCC)  Assessment &  Plan  -Chronic history of pulmonary hypertension  -Continuing home sildenafil/macitentan    Essential (primary) hypertension  Assessment & Plan  -Chronic history of hypertension  -Normotensive currently  -Continuing home amlodipine 10mg qD          VTE Assessment: [place]    VTE Prophylaxis:  SCD's    Code Status: Full Code      Estimated Discharge Date: 4/19/2024   Disposition Planning: Likely discharge to home tomorrow 4/19     Gerard Olsen,   4/18/2024

## 2024-04-18 NOTE — PLAN OF CARE
Problem: Adult Inpatient Plan of Care  Goal: Plan of Care Review  Outcome: Progressing  Flowsheets (Taken 4/18/2024 1422)  Progress: improving  Outcome Evaluation: mobility limited by decreased strength, endurance, and balance.  Plan of Care Reviewed With: patient     Problem: Adult Inpatient Plan of Care  Goal: Patient-Specific Goal (Individualized)  Outcome: Progressing  Flowsheets (Taken 4/18/2024 1422)  Patient/Family-Specific Goals (Include Timeframe): return home     Problem: Mobility Impairment  Goal: Optimal Mobility [place] and Safety  Intervention: Support Mobility [place] and Safety  Flowsheets (Taken 4/18/2024 1422)  Mobility Safety Promotion: physical assistance provided

## 2024-04-19 VITALS
WEIGHT: 118 LBS | HEART RATE: 99 BPM | TEMPERATURE: 97.9 F | OXYGEN SATURATION: 100 % | DIASTOLIC BLOOD PRESSURE: 71 MMHG | RESPIRATION RATE: 18 BRPM | BODY MASS INDEX: 18.96 KG/M2 | HEIGHT: 66 IN | SYSTOLIC BLOOD PRESSURE: 156 MMHG

## 2024-04-19 PROCEDURE — 99233 SBSQ HOSP IP/OBS HIGH 50: CPT | Performed by: INTERNAL MEDICINE

## 2024-04-19 PROCEDURE — 63700000 HC SELF-ADMINISTRABLE DRUG: Performed by: STUDENT IN AN ORGANIZED HEALTH CARE EDUCATION/TRAINING PROGRAM

## 2024-04-19 PROCEDURE — 63700000 HC SELF-ADMINISTRABLE DRUG: Performed by: INTERNAL MEDICINE

## 2024-04-19 PROCEDURE — G0378 HOSPITAL OBSERVATION PER HR: HCPCS

## 2024-04-19 RX ORDER — PREDNISONE 5 MG/1
TABLET ORAL
Qty: 126 TABLET | Refills: 0 | Status: SHIPPED | OUTPATIENT
Start: 2024-04-19 | End: 2024-04-19

## 2024-04-19 RX ORDER — PREDNISONE 5 MG/1
TABLET ORAL
Qty: 126 TABLET | Refills: 0 | Status: SHIPPED | OUTPATIENT
Start: 2024-04-19 | End: 2024-05-24 | Stop reason: HOSPADM

## 2024-04-19 RX ORDER — PANTOPRAZOLE SODIUM 40 MG/1
40 TABLET, DELAYED RELEASE ORAL DAILY
Qty: 30 TABLET | Refills: 1 | Status: SHIPPED | OUTPATIENT
Start: 2024-04-20 | End: 2024-07-19 | Stop reason: SDUPTHER

## 2024-04-19 RX ORDER — ATORVASTATIN CALCIUM 40 MG/1
40 TABLET, FILM COATED ORAL
Qty: 30 TABLET | Refills: 2 | Status: SHIPPED | OUTPATIENT
Start: 2024-04-19 | End: 2024-06-20 | Stop reason: SDUPTHER

## 2024-04-19 RX ADMIN — ACETAMINOPHEN 650 MG: 325 TABLET ORAL at 17:09

## 2024-04-19 RX ADMIN — ATORVASTATIN CALCIUM 40 MG: 40 TABLET, FILM COATED ORAL at 17:09

## 2024-04-19 RX ADMIN — COLCHICINE 0.3 MG: 0.6 TABLET ORAL at 09:32

## 2024-04-19 RX ADMIN — SILDENAFIL 20 MG: 20 TABLET, FILM COATED ORAL at 09:33

## 2024-04-19 RX ADMIN — SILDENAFIL 20 MG: 20 TABLET, FILM COATED ORAL at 13:31

## 2024-04-19 RX ADMIN — Medication 1000 UNITS: at 09:33

## 2024-04-19 RX ADMIN — ACETAMINOPHEN 650 MG: 325 TABLET ORAL at 03:46

## 2024-04-19 RX ADMIN — PREDNISONE 20 MG: 20 TABLET ORAL at 09:32

## 2024-04-19 RX ADMIN — ACETAMINOPHEN 650 MG: 325 TABLET ORAL at 09:38

## 2024-04-19 RX ADMIN — AMLODIPINE BESYLATE 10 MG: 10 TABLET ORAL at 09:33

## 2024-04-19 RX ADMIN — CYANOCOBALAMIN TAB 1000 MCG 1000 MCG: 1000 TAB at 09:32

## 2024-04-19 RX ADMIN — LEVOTHYROXINE SODIUM 100 MCG: 0.1 TABLET ORAL at 05:35

## 2024-04-19 RX ADMIN — PANTOPRAZOLE SODIUM 40 MG: 40 TABLET, DELAYED RELEASE ORAL at 09:32

## 2024-04-19 RX ADMIN — OXYCODONE HYDROCHLORIDE AND ACETAMINOPHEN 250 MG: 500 TABLET ORAL at 09:32

## 2024-04-19 ASSESSMENT — COGNITIVE AND FUNCTIONAL STATUS - GENERAL
STANDING UP FROM CHAIR USING ARMS: 3 - A LITTLE
WALKING IN HOSPITAL ROOM: 3 - A LITTLE
MOVING TO AND FROM BED TO CHAIR: 3 - A LITTLE
CLIMB 3 TO 5 STEPS WITH RAILING: 3 - A LITTLE

## 2024-04-19 NOTE — PLAN OF CARE
Problem: Adult Inpatient Plan of Care  Goal: Plan of Care Review  4/19/2024 1245 by Augustina Casiano RN  Outcome: Progressing  Flowsheets (Taken 4/19/2024 1245)  Progress: improving  Plan of Care Reviewed With: patient  4/19/2024 1243 by Augustina Casiano RN  Outcome: Progressing  Flowsheets (Taken 4/19/2024 1243)  Progress: improving  Outcome Evaluation: No significant events. Pt very fatigued w activity. Poor performance on 2 minute walking test.  Plan of Care Reviewed With: patient  4/19/2024 1243 by Augustina Casiano RN  Outcome: Progressing  Goal: Patient-Specific Goal (Individualized)  4/19/2024 1245 by Augustina Casiano RN  Outcome: Progressing  4/19/2024 1243 by Augustina Casiano RN  Outcome: Progressing  4/19/2024 1243 by Augustina Casiano RN  Outcome: Progressing  Goal: Absence of Hospital-Acquired Illness or Injury  4/19/2024 1245 by Augustina Casiano RN  Outcome: Progressing  4/19/2024 1243 by Augustina Casiano RN  Outcome: Progressing  4/19/2024 1243 by Augustina Casiano RN  Outcome: Progressing  Goal: Optimal Comfort and Wellbeing  4/19/2024 1245 by Augustina Casiano RN  Outcome: Progressing  4/19/2024 1243 by Augustina Casiano RN  Outcome: Progressing  4/19/2024 1243 by Augustina Casiano RN  Outcome: Progressing  Goal: Readiness for Transition of Care  4/19/2024 1245 by Augustina Casiano RN  Outcome: Progressing  4/19/2024 1243 by Augustina Casiano RN  Outcome: Progressing  4/19/2024 1243 by Augustina Casiano RN  Outcome: Progressing     Problem: Skin Injury Risk Increased  Goal: Skin Health and Integrity  4/19/2024 1245 by Augustina Casiano RN  Outcome: Progressing  4/19/2024 1243 by Augustina Casiano RN  Outcome: Progressing  4/19/2024 1243 by Augustina Casiano RN  Outcome: Progressing     Problem: Fall Injury Risk  Goal: Absence of Fall and Fall-Related Injury  4/19/2024 1245 by Augustina Casiano RN  Outcome: Progressing  4/19/2024 1243 by Augustina Casiano RN  Outcome: Progressing  4/19/2024 1243 by Augustina Casiano, RN  Outcome: Progressing     Problem: Chest Pain  Goal: Resolution of Chest Pain Symptoms  4/19/2024  1245 by Kreps, Augustina, RN  Outcome: Progressing  4/19/2024 1243 by Augustina Casiano RN  Outcome: Progressing  4/19/2024 1243 by Augustina Casiano RN  Outcome: Progressing     Problem: Self-Care Deficit  Goal: Improved Ability to Complete Activities of Daily Living  4/19/2024 1245 by Augustina Casiano RN  Outcome: Progressing  4/19/2024 1243 by Augustina Casiano RN  Outcome: Progressing  4/19/2024 1243 by Augustina Casiano RN  Outcome: Progressing     Problem: Mobility Impairment  Goal: Optimal Mobility [place] and Safety  4/19/2024 1245 by Augustina Casiano RN  Outcome: Progressing  4/19/2024 1243 by Augustina Casiano RN  Outcome: Progressing  4/19/2024 1243 by Augustina Casiano RN  Outcome: Progressing

## 2024-04-19 NOTE — DISCHARGE SUMMARY
Hospital Medicine Service -  Inpatient Discharge Summary        BRIEF OVERVIEW   Patient: Arielle Felix (1960)    Admitting Provider: Capo Estevez MD  Attending Provider: Gerard Quach DO Attending phys phone: [phone]    PCP: Sara Simmons [phone]    Admission Date: 4/16/2024  Discharge Date: 4/19/2024     DISCHARGE DIAGNOSES      Primary Discharge Diagnosis  Acute chest pain    Secondary Discharge Diagnoses  Active Hospital Problems    Diagnosis Date Noted    Acute chest pain 04/16/2024     Priority: High    Lung mass 04/16/2024     Priority: Medium    Pericardial effusion 04/13/2023     Priority: Medium    CAD (coronary artery disease) 04/18/2024    GERD (gastroesophageal reflux disease) 04/16/2024    Hypothyroid 03/04/2024    Scleroderma (CMS/Formerly [facility]) 04/06/2023    Interstitial lung disease (CMS/Formerly [facility]) 11/28/2022    Essential (primary) hypertension 08/09/2022    Pulmonary hypertension (CMS/Formerly [facility]) 08/09/2022      Resolved Hospital Problems   No resolved problems to display.       Problem List on Day of Discharge  * Acute chest pain  Assessment & Plan  -63-year-old female with above-mentioned medical problems who presented initially for evaluation of worsening chest pain, likely in the setting of large pericardial effusion  -Telemetry without arrhythmias, HST 28->30, CRP WNL,    -Per chart review, had coronary CT last month (03/2024) which revealed two-vessel coronary artery disease as above that is moderate in severity. ECHO earlier this month revealing EF: 60%. No regional wall motion abnormalities .   -Cardiology evaluated, believe etiology of her pain is inflammatory in nature 2/2 to her auto-immune disease, recommending resuming steroids   -Patient is refusing plavix, was amenable to starting statin   -Per Cards recs, discharged on prednisone 20mg qD for 2 weeks, followed by 15mg for 2 weeks. Will keep on prednisone 10mg qD until she follows up with Rheumatology as outpatient.    -Rheumatology consulted, patient received dose of IV tocilizumab on 4/18/2024    Lung mass  Assessment & Plan  -CT scan showed lung mass 16 mm in size in the left lower lobe, she does have history of tobacco abuse in the past  -Pulmonology consulted, will need outpatient PET CT, ordered at discharge     Pericardial effusion  Assessment & Plan  -Known history  -CTA Chest upon admission revealing stable cardiomegaly and large pericardial effusion   -Cardiology consulted and following, recommending repeat ECHO in 1 month, continuing colchicine, and resuming steroids  -Have started prednisone 20mg qD as above, further added PPI for GI ppx     CAD (coronary artery disease)  Assessment & Plan  -Has known history  -Cardiology consulted above, recommending initiation of plavix/statin (patient is allergic to ASA)  -I discussed extensively with patient, at this time she declines starting plavix as she is concerned about bleeding. Understands risk of MI/CVA and PVD. Is agreeable to starting statin, have started atorvastatin 40mg qD     Hypothyroid  Assessment & Plan  Chronic history of hypothyroidism, continue levothyroxine    Scleroderma (CMS/HCC)  Assessment & Plan  -Chronic history of Raynaud's phenomenon/scleroderma  -Continue tocilizumab on discharge  -Rheumatology consulted as noted above     Interstitial lung disease (CMS/HCC)  Assessment & Plan  As above    Pulmonary hypertension (CMS/HCC)  Assessment & Plan  -Chronic history of pulmonary hypertension  -Continuing home sildenafil/macitentan    Essential (primary) hypertension  Assessment & Plan  -Chronic history of hypertension  -Normotensive currently  -Continuing home amlodipine 10mg qD     Hypoxia  Assessment & Plan  -Requiring between 1-2L NC at times  -Ambulatory pulse ox of 88%, qualified for home oxygen  -Patient was set up with home oxygen at time of discharge       SUMMARY OF HOSPITALIZATION      Presenting Problem/History of Present Illness  Arielle GUAJARDO  Isidro is a 63 female with past medical history of hypertension, hypothyroidism, pulmonary hypertension, scleroderma, interstitial lung disease, Raynaud's phenomena who presented initially for evaluation of worsening chest pain     She was recently admitted and discharged in March 2024 after she was found to have pericardial effusion, was sent on a course of steroids/colchicine, she reports being compliant with the medication     During patient encounter she stated, that she has been having off-and-on pain, but today was really worse to the point that she was feeling lightheaded, and could feel her heart racing, so decided to come to the ER for further evaluation, currently reporting some dyspnea on exertion, otherwise symptoms better controlled, review of system negative     On presentation to the ER she was noted to be hypertensive, tachycardic  Lab/imaging finding/EKG personally reviewed by me  Labs showed mild elevated troponin, elevated BNP, leukocytosis  CT angio of the chest showed no evidence of PE, did show a large pericardial effusion, emphysema, pulmonary hypertension, 16 mm left lobe lung mass  Chest x-ray showed cardiomegaly/pericardial effusion, vascular prominence  EKG showed sinus tachycardia, normal axis, nonspecific ST changes    Hospital Course    Patient was admitted to [facility] from 4/16/2024 to 4/19/2024.     Please see above for hospital course by problem list.     Exam on Day of Discharge  Physical Exam  General: Chronically Ill Appearing, NAD, Conversant, AAOx3  HEENT: PERRL, EOMI, MMM  Cardiovascular: Decreased heart sounds, S1/S2, No murmur appreciated  Respiratory: CTA B/L  Abdomen: Soft and non-tender, normoactive BS's  Extremities: No LE edema noted  Skin: Chronic scarring noted on face/extremities/hands  Psych: Appropriate mood and affect       Consults During Admission  IP CONSULT TO CARDIOLOGY  IP CONSULT TO PULMONOLOGY/SLEEP MEDICINE  IP CONSULT TO  RHEUMATOLOGY    DISCHARGE MEDICATIONS               Medication List        START taking these medications      atorvastatin 40 mg tablet  Commonly known as: LIPITOR  Take 1 tablet (40 mg total) by mouth daily.  Dose: 40 mg     pantoprazole 40 mg EC tablet  Commonly known as: PROTONIX  Start taking on: April 20, 2024  Take 1 tablet (40 mg total) by mouth daily Indications: stress ulcer prevention.  Dose: 40 mg     predniSONE 5 mg tablet  Commonly known as: DELTASONE  Take 4 tabs (20 mg) by mouth for 2 weeks, then 3 tabs (15 mg) by mouth for 2 weeks, then 2 tabs (10mg) thereafter. Continue 10mg daily until you follow up with Rheumatology.            CONTINUE taking these medications      ACTEMRA 200 mg/10 mL (20 mg/mL) solution  Infuse 8 mg/kg into a venous catheter every 28 (twentyeight) days.  Dose: 8 mg/kg  Generic drug: tocilizumab     amLODIPine 5 mg tablet  Commonly known as: NORVASC  Take 10 mg by mouth every morning.  Dose: 10 mg     biotin 1 mg tablet  Take 1,000 mcg by mouth daily.  Dose: 1,000 mcg     cholecalciferol (vitamin D3) 1,000 unit (25 mcg) tablet  Take 1,000 Units by mouth daily.  Dose: 1,000 Units     colchicine 0.6 mg tablet  Commonly known as: COLCRYS  Take 0.5 tablets (0.3 mg total) by mouth daily.  Dose: 0.3 mg     cyanocobalamin (vitamin B-12) 1,000 mcg capsule  Take 1,000 mcg by mouth daily.  Dose: 1,000 mcg     docusate sodium 100 mg capsule  Commonly known as: COLACE  Take 100 mg by mouth daily as needed for constipation.  Dose: 100 mg     levothyroxine 100 mcg tablet  Commonly known as: SYNTHROID  Take 100 mcg by mouth daily.  Dose: 100 mcg     macitentan 10 mg tablet tablet  Commonly known as: OPSUMIT  Take 1 tablet (10 mg total) by mouth daily.  Dose: 10 mg     meclizine 25 mg tablet  Commonly known as: ANTIVERT  Take 25 mg by mouth daily as needed.  Dose: 25 mg     mupirocin 2 % ointment  Commonly known as: BACTROBAN  Apply 1 application. topically daily. Behind ears  Dose: 1  application.     sildenafiL (pulm.hypertension) 20 mg tablet  Commonly known as: REVATIO  Take 1 tablet (20 mg total) by mouth 3 (three) times a day.  Dose: 20 mg            STOP taking these medications      benzonatate 100 mg capsule  Commonly known as: TESSALON     nystatin 100,000 unit/mL suspension  Commonly known as: MYCOSTATIN                      PROCEDURES / LABS / IMAGING      Operative Procedures  None    Pertinent Imaging  CT ANGIOGRAPHY CHEST PULMONARY EMBOLISM WITH IV CONTRAST    Result Date: 4/16/2024  IMPRESSION: 1. No evidence of pulmonary embolism. 2. Stable cardiomegaly and large pericardial effusion. Cardiology consultation suggested. 3. Emphysema with bilateral lower lobe groundglass opacity and bronchiectasis in an NSIP pattern, which can be seen with scleroderma. Pulmonary consultation suggested. 4. Stable 16 mm left lower lobe masslike density which may represent atelectasis, lung cancer or lymphoma. When clinically appropriate PET/CT suggested. 5. Stable prominence of the main pulmonary artery which can be seen with pulmonary hypertension COMMENT: Technique: CT angiography of the chest was performed from the thoracic inlet through the upper abdomen using contiguous 1.25 mm transaxial sections utilizing a pulmonary embolism protocol. Images were acquired utilizing 100 cc Isovue-370 . 3D MIP and/or volume rendered image reconstruction performed and reviewed. CT DOSE:  One or more dose reduction techniques (e.g. automated exposure control, adjustment of the mA and/or kV according to patient size, use of iterative reconstruction technique) utilized for this examination. Comparisons studies: CT chest dated March 11, 2024. Lung parenchyma: Stable bilateral emphysema and lower lobe increased interstitial markings and bronchiectasis. Stable 16 mm left lower lobe masslike opacity Mediastinum: Stable subcentimeter lymph nodes.. Miriam: Stable mild adenopathy.. Heart and pericardium: Stable cardiomegaly  with large pericardial effusion measuring up to 2.1 cm. Pulmonary artery: Stable mild prominence of the main pulmonary artery up to 3.1 cm. No obvious pulmonary embolus. Aorta: Normal Chest wall and pleura: Normal. Axilla: Normal. Liver: Visualized portion is within normal limits. Spleen: Normal. Adrenals: Normal. Bones: Mild thoracolumbar degenerative change. Thyroid: Normal    X-RAY CHEST 1 VIEW    Result Date: 4/16/2024  IMPRESSION: 1. Enlarged cardiac silhouette corresponding to cardiomegaly and pericardial effusion noted on recent CT scan. Cardiac silhouette appears similar to slightly increased since the prior chest x-ray from one week ago. 2. Diffuse interstitial/vascular prominence suggesting interstitial disease and/or edema, similar to the recent prior chest x-ray.    X-RAY CHEST 2 VIEWS    Result Date: 4/9/2024  IMPRESSION: See above.     X-RAY FOOT LEFT 3+ VIEWS    Result Date: 4/9/2024  IMPRESSION: See above.      OUTPATIENT  FOLLOW-UP / REFERRALS / PENDING TESTS        Outpatient Follow-Up Appointments            In 2 months [facility] General Cardiology at [facility]    In 2 months Timothy Arvizu DO [facility] General Cardiology at [facility]            Referrals  Orders Placed This Encounter   Procedures    Ambulatory referral to Pulmonology     Standing Status:   Future     Standing Expiration Date:   10/19/2024     Referral Priority:   Routine     Referral Type:   Consultation     Referral Reason:   Follow up and Treat     Referred to Provider:   Kai Wasserman MD     Requested Specialty:   Pulmonary Disease     Number of Visits Requested:   10    Ambulatory referral to Rheumatology     Standing Status:   Future     Standing Expiration Date:   10/19/2024     Referral Priority:   Routine     Referral Type:   Consultation     Referral Reason:   Evaluate and Treat     Referred to Provider:   Gerson Sanders MD     Requested  Specialty:   Rheumatology     Number of Visits Requested:   10       DISCHARGE DISPOSITION AND DESTINATION      Disposition: Home   Destination: Home                            Code Status At Discharge: Full Code    Physician Order for Life-Sustaining Treatment Document Status        No documents found                     Dr Gerard Olsen

## 2024-04-19 NOTE — PROGRESS NOTES
"   Pulmonary Progress Note       SUBJECTIVE   Patient seen and examined at bedside. No acute events overnight.     Received tocilizumab dose yesterday.  Chest pain improving overall.   Remains on 2L O2.      OBJECTIVE        Vital Signs:   Visit Vitals  /65 (Patient Position: Lying)   Pulse 96   Temp 36.7 °C (98 °F)   Resp 18   Ht 1.676 m (5' 6\")   Wt 53.5 kg (118 lb)   LMP  (LMP Unknown)   SpO2 96%   Breastfeeding No   BMI 19.05 kg/m²       I/O's:  No intake or output data in the 24 hours ending 04/19/24 1335    Medications:    Reviewed. Pertinent medications as below.     amLODIPine  10 mg oral q AM    ascorbic acid  250 mg oral Daily    atorvastatin  40 mg oral Daily (6p)    cholecalciferol (vitamin D3)  1,000 Units oral Daily    colchicine  0.3 mg oral Daily    cyanocobalamin  1,000 mcg oral Daily    levothyroxine  100 mcg oral Daily (6:30a)    macitentan  10 mg oral Daily    pantoprazole  40 mg oral Daily    predniSONE  20 mg oral Daily    sildenafiL (pulm.hypertension)  20 mg oral TID           PHYSICAL EXAMINATION        General: The patient is in no acute distress.  Resting comfortably in bed.  HEENT: Mucous membranes are moist.  Sclera are anicteric.  Neck: Supple.  No cervical lymphadenopathy  Cardiovascular: S1 and S2 are present.  There are no murmurs.  Lungs: Decreased air entry at the bases   Abdomen: Soft, nontender and nondistended  Extremities: Cool and dry without edema  Neuro: Awake and alert.  Spontaneously moving all extremities       Diagnostic Data      Labs:    I have personally reviewed all pertinent patient laboratory results. Labs of note discussed below:    ABG Results         04/13/23     1949    Source Of Oxygen nc          CMP Results         04/18/24 04/17/24 04/16/24     0420 0352 1527     141 142    K 4.6 4.1 4.3    Cl 110 109 110    CO2 22 20 24    Glucose 96 72 86    BUN 29 24 23    Creatinine 0.9 1.0 0.8    Calcium 9.3 9.3 9.8    Anion Gap 9 12 8    AST -- -- 16    " ALT -- -- 8    Albumin -- -- 4.0    EGFR >60.0 >60.0 >60.0           Comment for K at 1527 on 04/16/24: Results obtained on plasma. Plasma Potassium values may be up to 0.4 mEQ/L less than serum values. The differences may be greater for patients with high platelet or white cell counts.    Comment for EGFR at 0420 on 04/18/24: Calculation based on the Chronic Kidney Disease Epidemiology Collaboration (CKD-EPI) equation refit without adjustment for race.    Comment for EGFR at 0352 on 04/17/24: Calculation based on the Chronic Kidney Disease Epidemiology Collaboration (CKD-EPI) equation refit without adjustment for race.    Comment for EGFR at 1527 on 04/16/24: Calculation based on the Chronic Kidney Disease Epidemiology Collaboration (CKD-EPI) equation refit without adjustment for race.          CBC Results         04/18/24 04/17/24 04/16/24     0420 0352 1527    WBC 8.40 8.73 10.69    RBC 3.54 3.73 4.04    HGB 9.8 10.3 11.5    HCT 32.8 34.3 37.8    MCV 92.7 92.0 93.6    MCH 27.7 27.6 28.5    MCHC 29.9 30.0 30.4     235 270           Comment for PLT at 1527 on 04/16/24: SPECIMEN QUALITY CHECKEDPLT CLUMPING SUSPECTED. PLT COUNT MAY BE SLIGHTLY HIGHER THAN REPORTED. IF CLINICALLY WARRANTED, SUGGEST COLLECTING SODIUM CITRATE TUBE (BLUE TOP) IN ADDITION TO EDTA TUBE FOR FOLLOW UP CBC TESTING.          Microbiology Results       ** No results found for the last 720 hours. **          Imaging:    I have reviewed all pertinent imaging.      ASSESSMENT AND PLAN      62 y.o. 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           Cutaneous systemic scleroderma with subsequent ILD and Group 1 pulmonary hypertension  Recent pericarditis with  concern for recurrence  Left lower lobe nodule, stable in size however enlarged over the past year.  Previous PET scan with an SUV of 4.1.     -c/w colchicine and prednisone   -s/p tocilizumab on 4/18/2024   -Needs an outpatient repeat PET CT scan for evaluation of left lower lobe nodule  -outpatient follow up with Dr Wasserman   -ILD and other lung findings remain stable from prior  -Check ambulatory pulse ox, wean o2     Please page [phone] with any questions/concerns        Rayray Martinez MD  PGY-4  Pulmonary/Critical Care Fellow  4/19/2024

## 2024-04-19 NOTE — UM PHYSICIAN REVIEW NOTE
Patient Name: Arielle Felix      MRN: [id number]    A phone call will be placed to payor to request Peer to Peer phone appeal.      4/16-19 4/16- 62 yo F with HTN, Hypothyroidism, Pulm htn , cutaneous systemic scleroderma with subsequent ILD and Grp 1 Pulm HTN , with recent admission for pericardial effusion (3/24) dc on steroids and colchicine admitted for chest pain  - Cards consult    4/17 - PN = CP unchanged    cards- suspect pericarditis/serositis- start prednisone 20 mg daily x 2 wks with gradual taper. Cont colchicine   Pulm- consider tocilizumab if pericardial effusion recurs  - rheum consult placed    4/18 - Rheum consulted - suspect scleroderma with systemic involvement of heart and lung as well as skin --give tocilizumb in house. (1 infusion)     4/19- dc home     Taryn Lee DO  4/19/2024

## 2024-04-19 NOTE — PLAN OF CARE
Care Coordination Discharge Plan Summary    Admission Assessment Summary    General Information  Readmission Within the last 30 days: no previous admission in last 30 days  Does patient have a :    Patient-Specific Goals (include timeframe): Return home    Living Arrangements  Arrived From: home  Current Living Arrangements: home  People in Home: alone  Home Accessibility: stairs to enter home (Group), stairs within home (Group)  Living Arrangement Comments: Pt resides alone in a 2  with 5 LARRY.    Social Determinants of Health - Screenings  Housing Stability     Utility Access     Transportation Needs       Functional Status Prior to Admission  Assistive Device/Animal Currently Used at Home: walker, front-wheeled, stair glide  Functional Status Comments: Pt states to require assistance at home for ADLs. Pt has home health aides for assistance. Pt states she ambulates slowly with a walker.  IADL Comments:      Discharge Needs Assessment    Concerns to be Addressed: discharge planning, care coordination/care conferences  Current Discharge Risk: lives alone  Anticipated Changes Related to Illness: none    Discharge Plan Summary    Patient Choice  Patient is currently a patient with [facility]. Referral made through [employer] with response that she has been accepted for continued home care services.       Concerns / Comments: Per rounds possible dc tomorrow. Rheum following, patient to start IV actemra infusion. Patient with home health rec from PT and is open with [facility], return referral will be sent via [employer]. Patient requesting EZEKIEL priest advised it may not be covered by insurance but order submitted via [facility]. Monitoring O2 needs.  4/19/24-Per care team rounds, patient to be evaluated for oxygen levels determine if he will need home oxygen.   Patient qualifies for home oxygen with pulse ox of 94% room air at rest and 88% pulse ox when ambulating. With oxygen, pulse  ox is 96%. Referral made through [employer]. Spoke with daughter Tiffanie regarding patient's home oxygen need and who will provide home oxygen tanks and concentrator.    Discharge Plan:  Disposition/Destination: Home Health Care - Other / Home   Home with oxygen through [facility] [facility]    Connection to Community  Patient declined offered resources. Patient currently a patient with [facility]. Will obtain oxygen through [facility] [facility].  Community Resources:      Discharge Transportation:  Is Out of Hospital DNR needed at Discharge: no  Does patient need discharge transport?  No Daughter will transport   Spoke with patient's daughter, Tiffanie. She understands that [facility] will contact her to schedule delivery of concentrator. She will transport patient home portable oxygen.

## 2024-04-19 NOTE — DISCHARGE INSTRUCTIONS
To Ms. Arielle Felix,    You were admitted to [facility] from 4/16/2024 to 4/19/2024. During this admission you underwent an extensive work up for your chief complaint of chest pain. You were evaluated by our Cardiology, Pulmonology, and Rheumatology specialists. It is believed that the cause of your chest pain is due to inflammation from your autoimmune disease. During this admission you were started on prednisone 20mg daily. You will need to continue to take prednisone after discharge as prescribed below. After a 4 week taper you will continue to take prednisone 10mg daily until you follow up with Rheumatology. Furthermore, you received your first dose of tocilizumab while you were hospitalized. Lung imaging further revealed a potential lung mass. You will need an outpatient PET CT Scan (which has been ordered) and will need to follow up with Pulmonology after discharge. Given your history of coronary artery disease you should be taking Plavix and Atorvastatin to prevent heart attacks in the future. You have declined plavix. You have been prescribed atorvastatin to take daily. You will need to follow up with Dr. Arvizu after discharge. It was a pleasure taking care of you!

## 2024-04-19 NOTE — CONSULTS
Cardiology Pulmonary Hypertension  Progress Note        Interval History: Seen and examined lying in bed. She reports persistent, unchanged chest pain/pressure, which does not have exertional/rest component. She denies shortness of breath. She denies light-headedness or dizziness.     Consult Background from 4/17:   Ms. Felix is a 63 y.o. female with a past medical history of Pulmonary HTN, HTN, Raynaud's Disease, Cutaneous Systemic Scleroderma (dx 1998), ILD, PE (3/2023). She is a patient of Dr. Nguyễn. She was previously followed by Dr. Jos Valdez at [facility]. Echocardiogram from 4/21/2022 showed LVEF: 55-60%, mild aortic valve thickening, pulmonary artery systolic pressure: 52 mmHg and trivial pericardial effusion. LHC and RHC (separate occasions) over the last 5-10 years which showed normal hemodynamics and normal coronaries. She had a RHC 9/02/2020 showing top-normal right sided filling pressures with mild pulmonary hypertension, top-normal PVR and normal pulmonary capillary wedge pressure. The cardiac index was normal, seen below.     On 6/27/2022, she was in [facility] ER for chest pain. EKG: NSR without ischemic changes. hsTrop: 12->16, d-dimer: 0.56, CXR showed cardiomegaly and diffuse interstitial prominence.     Dr. Nguyễn ordered echocardiogram, which was completed on 8/22/2022 and showed estimated RVSP = 50-55 mmHg, normal-sized LV with normal LV systolic function. Estimated EF 55- 60%. Wall motion grossly normal, mild concentric left ventricular hypertrophy, grade I LV diastolic dysfunction, probably normal RV size and function.    At her initial visit on 10/11/2022, she reported that she felt very short of breath with minimal activity most of the time. She reports that this started about 1 year prior and had progressively gotten worse. She described PND and bendopnea. She reported that she experienced ankle swelling, worsened over the past year and usually worse towards the end of the night. She also  reported some swelling in her abdomen. She reported daily palpations. I recommended a RHC which was done 11/28/2022 and showed: Normal right sided filling pressures. Mildly elevated left sided filling pressures. Precapillary pulmonary hypertension with mean PA 36 mmHg.    She was hospitalized on 3/19/2023 at [facility] for PE diagnosed on V/Q scan. She reports that she had presented with left arm pain and heaviness. She states that she was started on Apixaban. One week after her last office visit (4/6/2023), she presented to [facility] with chest pain, epistaxis and hemoptysis. Echocardiogram showed: left ventricular cavity size normal with mild left ventricular hypertrophy and preserved systolic function. Estimated EF 65%. Chest CTA showed no evidence of PE; Apixaban was discontinued.     She had a repeat echocardiogram (6/2023) which showed: Normal left ventricular cavity size and mild concentric left ventricular hypertrophy. Normal systolic function. EF: 60%. Normal right ventricular structure and function. Mild tricuspid valve regurgitation with estimated RVSP: 55 mmHg. Compared to previous study from 4/14/2023, estimated right ventricular systolic pressure were higher.    At her last office visit on 7/25/2023, she reported that she was not sure if she received/started the Macitentan. She stated at the time we initiated it, her Pulmonologist also started her on a new medication (Mycophenolate), and she had significant GI intolerance with this. It was trialed at various doses but ultimately stopped. At that visit, I asked her to check if she had Macitentan and if she did to start it and monitor her SpO2.    She underwent V/Q scan in September 2023, which showed intermediate-to-high probability of pulmonary embolic disease, as a result she completed CTA Chest PE which showed: no evidence for filling defect or vessel cutoff to suggest pulmonary embolism. Enlargement of the pulmonary arteries compatible with  pulmonary arterial hypertension was seen.    She started Macitentan around August or September 2023.    [place] [facility] [place]2024 with chest pressure, palpitations, nausea. She was noted to have hypoxia and tachypnea at presentation. Found to have moderate to large pericardial effusion without tamponade.   She was started on colchicine 0.6 mg BID on 3/4, and when she did not improve sympomatically prednisone 20 mg daily was added on 3/5. on 3/7, developed multiple episodes of diarrhea and colchicine dose decreased to 0.3 mg. Coronary CTA done as part of the ischemic workup showed moderate 2 vessel CAD and patient recommended to begin atorvastatin 20 mg daily and clopidogrel 75 mg daily but she refused those two medications.     She was discharged on Colchicine 0.3 mg for at least 3 months and Prednisone 20 mg for a total two week course until 3/19, then decrease the dose to 10 mg daily for 2 weeks until 4/2/2024,  then decrease dose to 5 mg daily for 2 weeks until 4/16.     She reports she tapered off prednosione as instructed but has continued to take Colchicine as prescribed.   She completed an echocardiogram 04/10 which revealed Small-to-moderate sized, circumferential pericardial effusion. No echocardiographic evidence for cardiac tamponade.   Compared to previous study from 3/4/2024, no significant change. Pericardial effusion size is similar, estimated right atrial pressure lower.     She [place] [facility] [place] with increased pleuritic chest pain. On presentation to the ER she was noted to be hypertensive and tachycardic. Labs showed mild elevated troponin, elevated BNP, leukocytosis CT angio of the chest showed no evidence of PE, did show a large pericardial effusion, emphysema, pulmonary hypertension, 16 mm left lobe lung mass.  EKG showed sinus tachycardia, normal axis, nonspecific ST changes or W[place] changes.     She reports she will be starting a new injectable medication prescribed by her  "Rheumatologist on 04/29.      Past Medical / Surgical History:  Pulmonary HTN, HTN, Raynaud's Disease, Cutaneous Systemic Scleroderma (dx 1998), ILD, PE (3/2023), Follicular Carcinoma of Thyroid, Tenosynovitis, de Quervain, h/o Hernia Repair, h/o Appendicitis, h/o Digit Amputation, H/o Total Thyroidectomy (Dr. Pacheco at [facility]; 4/2013)    Family & Social History: She is , she has two children. She works as an .     Tobacco: former 2005  EtOH: never   Illicits: never     Her brother has CAD with stent  1st cousin with SLE  Both parents had \"heart problems\"    Allergies:   Allergies   Allergen Reactions    Aspirin Shortness of breath     Other reaction(s): Unknown  \"stop breathing\"      Ibuprofen Shortness of breath     Stop breathing    Iodine Shortness of breath     Other reaction(s): Unknown    Iodine And Iodide Containing Products Shortness of breath     ? rash    Penicillins Shortness of breath     Other reaction(s): Unknown    Sulfa (Sulfonamide Antibiotics) Angioedema    Clindamycin     Hydrochlorothiazide      Other reaction(s): Abdominal Pain   Slow heart rate    Oxycodone      Other reaction(s): Vomiting    Sulfamethoxazole-Trimethoprim      Other reaction(s): Vomiting, Weakness     Latex Rash       Medications:   Current Facility-Administered Medications   Medication Dose Route Frequency Provider Last Rate Last Admin    acetaminophen (TYLENOL) tablet 650 mg  650 mg oral q6h PRN Capo Estevez MD   650 mg at 04/18/24 1955    amLODIPine (NORVASC) tablet 10 mg  10 mg oral q AM Capo Estevez MD   10 mg at 04/18/24 0915    ascorbic acid (VITAMIN C) tablet 250 mg  250 mg oral Daily Gerard Quach DO   250 mg at 04/18/24 1609    atorvastatin (LIPITOR) tablet 40 mg  40 mg oral Daily (6p) Gerard Quach DO        cholecalciferol (vitamin D3) tablet 1,000 Units  1,000 Units oral Daily Gerard Quach DO   1,000 Units at 04/18/24 1609    colchicine (COLCRYS) tablet 0.3 mg  0.3 mg oral Daily Capo Estevez MD  "  0.3 mg at 04/18/24 0915    cyanocobalamin (VITAMIN B12) tablet 1,000 mcg  1,000 mcg oral Daily Gerard Quach, DO   1,000 mcg at 04/18/24 1609    glucose chewable tablet 16-32 g of dextrose  16-32 g of dextrose oral PRN Capo Estevez MD        Or    dextrose 40 % oral gel 15-30 g of dextrose  15-30 g of dextrose oral PRN Capo Estevez MD        Or    glucagon (GLUCAGEN) injection 1 mg  1 mg intramuscular PRN Capo Estevez MD        Or    dextrose 50 % in water (D50) injection 12.5 g  25 mL intravenous PRN Capo Estevez MD        docusate sodium (COLACE) capsule 100 mg  100 mg oral Daily PRN Capo Estevez MD        levothyroxine (SYNTHROID) tablet 100 mcg  100 mcg oral Daily (6:30a) Capo Estevez MD   100 mcg at 04/18/24 0616    macitentan (OPSUMIT) tablet 10 mg  10 mg oral Daily Teri Castañeda MD   10 mg at 04/18/24 0915    meclizine (ANTIVERT) tablet 25 mg  25 mg oral Daily PRN Capo Estevez MD        ondansetron ODT (ZOFRAN-ODT) disintegrating tablet 4 mg  4 mg oral q8h PRN Teri Castañeda MD        Or    ondansetron (ZOFRAN) injection 4 mg  4 mg intravenous q8h PRN Teri Castañeda MD        pantoprazole (PROTONIX) tablet,delayed release (DR/EC) 40 mg  40 mg oral Daily Gerard Quach DO   40 mg at 04/18/24 0915    predniSONE (DELTASONE) tablet 20 mg  20 mg oral Daily Gerard Quach DO   20 mg at 04/18/24 0915    sildenafiL (pulm.hypertension) (REVATIO) tablet 20 mg  20 mg oral TID Capo Estevez MD   20 mg at 04/18/24 1952       Review of Systems:   All other systems reviewed and are negative except as per HPI.    Physical Exam:     Wt Readings from Last 10 Encounters:   04/17/24 53.5 kg (118 lb)   04/10/24 44.5 kg (98 lb)   03/16/24 44.6 kg (98 lb 5.2 oz)   02/01/24 47.6 kg (105 lb)   07/25/23 50.8 kg (112 lb)   07/25/23 50.8 kg (112 lb)   06/21/23 51.3 kg (113 lb)   04/19/23 51.7 kg (113 lb 14.4 oz)   04/06/23 52.6 kg (116 lb)   01/18/23 54.4 kg (120 lb)       BP Readings from Last 5 Encounters:   04/18/24 136/60   04/10/24  128/72   03/16/24 (!) 136/6   02/01/24 120/70   07/25/23 130/80       Vitals:    04/18/24 2000   BP:    Pulse: 74   Resp:    Temp:    SpO2:        Body mass index is 19.05 kg/m².  Body surface area is 1.58 meters squared.    Constitutional: NAD, AAOx3  HENT: Normocephalic, atraumatic head, sclerae anicteric, no cervical lymphadenopathy, trachea midline  Cardiovascular: RRR, no murmurs, rubs or gallops, JVP: 7 cm H2O   cm H2O, no carotid bruits.  Respiratory: CTA bilateral lung fields, no wheezes, rales or rhonchi  GI: soft, non-tender/non-distended  Musculoskeletal / Extremities: no peripheral edema, +2 pulses bilateral radial, DP/PT arteries, skin tightening on face and fingertips  Skin: no rashes  Neuro / Psych: no focal deficits, CNII-XII grossly intact, appropriate, cooperative    Diagnostic Data:     Results from last 7 days   Lab Units 04/18/24 0420 04/17/24  0352 04/16/24  1527   SODIUM mEQ/L 141 141 142   POTASSIUM mEQ/L 4.6 4.1 4.3   CHLORIDE mEQ/L 110* 109* 110*   CO2 mEQ/L 22 20* 24   BUN mg/dL 29* 24 23   CREATININE mg/dL 0.9 1.0 0.8   CALCIUM mg/dL 9.3 9.3 9.8   ALBUMIN g/dL  --   --  4.0   BILIRUBIN TOTAL mg/dL  --   --  0.3   ALK PHOS IU/L  --   --  68   ALT IU/L  --   --  8   AST IU/L  --   --  16   GLUCOSE mg/dL 96 72 86   MAGNESIUM mg/dL  --  1.8  --      Results from last 7 days   Lab Units 04/18/24  0420 04/17/24  0352 04/16/24  1527   WBC K/uL 8.40 8.73 10.69*   HEMOGLOBIN g/dL 9.8* 10.3* 11.5*   HEMATOCRIT % 32.8* 34.3* 37.8   MCV fL 92.7 92.0 93.6   PLATELETS K/uL 254 235 270     Component      Latest Ref Rng 4/13/2023 3/3/2024 3/12/2024   Lactate      0.4 - 2.0 mmol/L 1.0  1.1  0.9      Component      Latest Ref Rng 4/13/2023 2/27/2024 3/3/2024   BNP      <=100 pg/mL 105 (H)  82  119 (H)       Component      Latest Ref Rng 3/3/2024   High Sens Troponin I      <15.0 pg/mL 12.6    High Sens Troponin I       12.2      Component      Latest Ref Rng 3/4/2024   CRP      <7.00 mg/L 8.90 (H)        Component      Latest Ref Rn 3/4/2024   Sed Rate      0 - 30 mm/hr 119 (H)       Component      Latest Ref Rn 3/3/2024   D-Dimer, Quant      0.00 - 0.50 ug/mL FEU 0.68 (H)       Component      Latest Ref Rn 4/13/2023   Hemoglobin A1C      <5.7 % 4.4        Component      Latest Ref [facility] 4/14/2023   Triglycerides      30 - 149 mg/dL 44    Cholesterol      <=200 mg/dL 194    HDL      >=55 mg/dL 49 (L)    LDL Calculated      <=100 mg/dL 136 (H)    Non-HDL, Calculated      mg/dL 145    RISK      <=5.0  4.0       Component      Latest Ref [facility] 4/13/2023   High Sens Troponin I      <15.0 pg/mL 23.0 (H)       Component      Latest Ref [facility] 4/14/2023   Ferritin      11 - 250 ng/mL 75      Component      Latest Ref [facility] 4/13/2023   TSH      0.34 - 5.60 mIU/L 4.71      Component      Latest Ref [facility] 4/14/2023   Iron      35 - 150 ug/dL 29 (L)    TIBC      270 - 460 ug/dL 245 (L)    UIBC      180 - 360 ug/dL 216    Iron Saturation      15 - 45 % 12 (L)        Component      Latest Ref [facility] 6/27/2022 4/13/2023   BNP      <=100 pg/mL 111 (H)  105 (H)                                     ---    ECG (4/16/2024, personally reviewed):   Sinus tachycardia   Nonspecific ST and T wave abnormality   HR: 129 bpm     ---    TTE (4/10/2024, personally reviewed):  1. Normal left ventricular cavity size with mild concentric left ventricular hypertrophy. Normal systolic function. EF: 60%. No regional wall motion abnormalities. Cannot determine diastolic function. Global longitudinal strain not reported due to technical limitations of study.   2. Aortic valve has three cusps. Trace aortic regurgitation. Aortic valve and root sclerosis.  3. Thickened mitral valve leaflets. Trace mitral valve regurgitation. Normal left atrial size.   4. Normal right ventricular structure and function. Trace tricuspid valve regurgitation with estimated RVSP: 51 mmHg (assuming right atrial pressure of 3 mmHg). Normal right atrial size. Pulmonic valve structurally  normal. Trace pulmonic valve regurgitation. Pulsed wave Doppler of the RVOT systolic profile shows decreased acceleration times and geometry consistent with mildly elevated PVR.  5. IVC size is normal with > 50% inspiratory collapse consistent with normal right atrial pressure. Small-to-moderate sized, circumferential pericardial effusion. No echocardiographic evidence for cardiac tamponade.   6. Compared to previous study from 3/4/2024, no significant change. Pericardial effusion size is similar, estimated right atrial pressure lower.         CTA Abd / Pelvis (3/13/2024, personally reviewed):   There is dilatation of the of proximal/mid small bowel with relatively abrupt  transition left hemiabdomen, likely obstruction. Questionable lesion at the  transition point. This could be better evaluated with CT or MRI enterography.     Aorta and major branches patent. Superior mesenteric artery patent without  significant stenosis.  Suspect moderate stenosis of the origins of the of renal arteries bilaterally.     Large pericardial effusion, no change.     Patchy opacities lower lungs, similar to prior.     TTE (3/4/2024, personally reviewed):   Normal-sized left ventricle. Mild left ventricular hypertrophy. Preserved systolic function. LVEF 60%. No regional wall motion abnormalities. Grade II diastolic dysfunction.  Normal global longitudinal strain (-20%).  The aortic valve is not well seen.  Cannot determine the number of cusps.  Sclerotic leaflets.  No aortic stenosis.  Trace aortic insufficiency.  Normal sized aortic root.  The visualized portion of the ascending aorta is normal in size.  The mitral valve leaflets are thickened. Mild mitral annular calcification. Trace mitral regurgitation.   Mildly dilated left atrium.  Normal-sized right ventricle. Normal right ventricular systolic function.  Thickened tricuspid valve. There is mild tricuspid regurgitation with estimated RVSP of 46 mmHg.   Pulmonic valve is not well  visualized. Trace pulmonic regurgitation.   Mildly dilated right atrium.  IVC is enlarged with <50% respiratory variation consistent with elevated right atrial filling pressure (at least 15 mmHg).   Moderate, circumferential, circumferential pericardial effusion.  The largest pocket is located inferolaterally.  Elevated right atrial pressure.  No other clear echocardiographic features of increased pericardial pressure.  Correlate clinically.   Compared to previous TTE from 6/21/2023, pericardial effusion now moderate in size.  Right atrial pressure now elevated.       CTA Chest (3/3/2024):   IMPRESSION:  1.  No evidence of acute pulmonary embolism to the level of the segmental  branches.  2.  Moderate to large pericardial effusion measuring higher than simple fluid  density.  3.  Lower lobe lung field predominant interstitial thickening with some relative  subpleural sparing, consistent with a chronic interstitial lung disease,  unchanged from the prior study.  4.  Continued bilateral axillary, mediastinal, and bilateral hilar  lymphadenopathy, not definitely changed from the prior study, possibly related  to the patient's sarcoid.  5.  Emphysema.     CTA Chest PE (10/4/2023):  IMPRESSION:  1. No evidence for filling defect or vessel cutoff to suggest pulmonary  embolism.  Enlargement of the pulmonary arteries compatible with pulmonary  arterial hypertension.  2. Changes throughout the lungs as described above which can be seen with  systemic sclerosis/scleroderma.  Underlying pneumonia the lung bases cannot be  entirely excluded in the proper clinical setting.  3.  Additional stable findings as described above.        VQ (9/12/2023):  IMPRESSION:  Intermediate to high probability of pulmonary embolic disease.     6MWT (7/25/2023, on Sildenafil 20 mg TID):        TTE (6/21/2023, personally reviewed):  1. Normal left ventricular cavity size and mild concentric left ventricular hypertrophy. Normal systolic function.  EF: 60%. No regional wall motion abnormalities. Grade I diastolic dysfunction.   2. Aortic valve cusps not well visualized. Trace aortic regurgitation. Aortic valve and root sclerosis.  3. Thickened mitral valve leaflets. Mild mitral annular calcification. Trace mitral regurgitation. Mildly dilated left atrial size.   4. Normal right ventricular structure and function. Mild tricuspid valve regurgitation with estimated RVSP: 55 mmHg (assuming right atrial pressure of 3 mmHg). Normal right atrial size. Pulmonic valve not well visualized. Trace pulmonic valve regurgitation. Pulsed wave Doppler of the RVOT systolic profile show decreased acceleration times ( msec) and late systolic notching consistent with elevated PVR.   5. IVC size is normal with > 50% inspiratory collapse consistent with normal right atrial pressure. Small pericardial effusion without echocardiographic evidence of tamponade.  6. Compared to previous study from 4/14/2023, estimated right ventricular systolic pressure is higher.        TTE (4/14/2023, personally reviewed):   The left ventricular cavity size is normal with mild left ventricular hypertrophy and preserved systolic function. Estimated EF 65%. No regional wall motion abnormalities. Grade I diastolic dysfunction.     The aortic valve is tricuspid. Aortic valve sclerosis. Trace aortic regurgitation.      The visualized portions of the aortic root and ascending aorta are normal in size.     The mitral valve is mildly thickened. Mild mitral annular calcification. Trace mitral regurgitation.       The left atrium is severely dilated.      The right ventricle is normal in size with preserved systolic function     The pulmonic valve is not well seen.     The tricuspid valve is normal in appearance. mild tricuspid regurgitation with an estimated RVSP of 34 mmHg.       Right atrium is normal in size.     The IVC is small and collapses > 50% with inspiration consistent with normal right atrial  pressures.     Small pericardial effusion, mostly posterior.       Compared to previous echocardiogram from 8/22/2022, there is no significant change        TTE (11/28/2022, personally reviewed):   Normal right- and mildly elevated left-sided filling pressures (RA: 3 mmHg, PCWP: 13 mmHg)  Elevated pulmonary artery pressures (60/22(35 mmHg)) in the setting of PCWP: 13 mmHg.   Normal cardiac output and index (CO: 5.1 L/min, CI: 3.2 L/min/m2)  PVR: 4.3 Wood units. SVR: 1840 dynes/sec/cm5      TTE (8/22/2022, personally reviewed):  Normal-sized LV. Normal LV systolic function. Estimated EF 55- 60%. Wall motion appears grossly normal. Mild concentric left ventricular hypertrophy. Grade I LV diastolic dysfunction.  Pulmonic valve not well visualized. Grossly normal pulmonic valve structure. Trace pulmonic valve regurgitation. No pulmonic valve stenosis.  Aortic valve not well visualized. Aortic valve not well seen but likely trileaflet. Focal aortic valve calcification present. Sclerotic aortic valve leaflets. Mild aortic valve regurgitation. No aortic valve stenosis.  RV not well visualized. Normal-sized RV. Normal RV systolic function.  Normal-sized RA.  There is a small loculated pericardial effusion anterior to the right atrium. No cardiac tamponade.  Sclerotic mitral valve. Mitral valve calcification of the anterior leaflet present.  Trace mitral valve regurgitation.  No significant mitral valve stenosis.  Normal-sized IVC. IVC demonstrates normal respiratory collapse.  Tricuspid valve structure is grossly normal. Mild to moderate tricuspid valve regurgitation.  Estimated RVSP = 50-55 mmHg.  Mildly dilated LA.  No previous echocardiogram available for comparison.     TTE (4/21/2022, outside study):  This result has an attachment that is not available.     A complete transthoracic echocardiogram (including 2D, color flow   Doppler, spectral Doppler and M-mode imaging) was performed using the   standard protocol. The  study quality was adequate.     The left ventricle is normal in size. There is moderately increased   wall thickness consistent with moderate concentric hypertrophy.   Endocardium is incompletely visualized, but no regional wall motion   abnormalities are noted in the visualized segments. Normal left   ventricular ejection fraction. The left ventricular ejection fraction by   visual estimate is 55% to 60%.     The right ventricle is normal in size. There is normal function of the   right ventricle.     The left atrium is normal in size. Left atrial volume is 58 mL.     The right atrial volume measures 52 mL. The right atrial volume index   measures 34 mL/m2.     There is trace mitral regurgitation. There is no mitral stenosis.     The aortic valve is trileaflet. There is mild aortic valve thickening.   There is no aortic stenosis. There is trace aortic regurgitation.     There is mild to moderate tricuspid regurgitation. Pulmonary artery   systolic pressure measures 52 mmHg. The pulmonary artery systolic pressure   is moderately elevated.     The aorta measures 3.2 cm at the sinus of Valsalva. The proximal   segment of the ascending aorta is mildly enlarged. The proximal segment of   the ascending aorta measures 3.4 cm. The proximal segment of the ascending   aorta measures 2.2 cm/m2, indexed for body surface area.     Trivial pericardial effusion present. There is no echocardiographic   evidence of cardiac tamponade.     The inferior vena cava is normal in size (diameter <21 mm) and   decreases >50% in size with inspiration, suggesting a normal right atrial   pressure of 3 mmHg (range 0-5 mm Hg).     Compared to the prior study of 3/13/2020, there are no significant   changes.        PFT (3/23/2022):      PFT (7/14/2021):      CT Chest (5/2/2021, outside study):  CLINICAL INFORMATION: Systemic sclerosis. Interstitial lung disease. Groundglass nodule     PROCEDURE: Unenhanced CT of the chest was performed using thin  section reconstructions. Images were obtained on inspiration and expiration.     COMPARISON: June and August 2020     FINDINGS:     LUNGS, PLEURA:     Groundglass opacities with reticulation with subpleural sparing in the lower lobes, without honeycombing or architectural distortion, unchanged.     Right upper lobe groundglass nodule, image 114 of series 3, 8 x 11 mm, previously 6 mm.     Expiratory images are of diagnostic quality and do not demonstrate evidence of clinically significant air trapping.     CARDIOVASCULAR, MEDIASTINUM, THYROID: Coronary calcifications. Trace pericardial effusion.     LYMPH NODES: Subcentimeter mediastinal lymph nodes, unchanged. Unchanged axillary lymph nodes.     SKELETON, CHEST WALL: No destructive bone lesion     UPPER ABDOMEN: Patulous esophagus. 3 mm right renal stone.       RHC (9/02/2020):  RA   8 mmHg   RV   40/5 mmHg   PA (mean)  44/16 (25) mmHg   PCWP   12 mmHg   CO   4.65 lpm (Thermodilution)   CI   2.65 lpm/m-squared   SVI    33 ml/beat/m-squared (lower limit normal 29)   PVR   2.8 mmHg/l/min (Wood units)   SVR   2064 dyne-sec-cm-5         PFT (3/18/2020):      CCTA (3/7/2024, personally reviewed):      LEFT MAIN: Patent.     LAD: Proximal: Calcified plaque with 30 to 50% stenosis.  Mid: Mixed plaque with  50 to 70% stenosis, CT FFR 0.89.  Distal: Patent.  D1: Patent  D2: Patent, CT FFR 0.88     LCX: The circumflex and 1 marginal branch are patent with normal CT FFR.     RCA: Proximal: Calcified plaque with minimal stenosis.  Otherwise the RCA, PDA,  and PLB are patent with normal CT FFR.     CORONARY CALCIUM COMPOSITE AGATSTON SCORE: 313  LM: LAD: 312    LCX: RCA: 1     This places the patient in the HICKS 96th risk percentile for age and gender  matched individuals.     VENTRICULAR FUNCTION AND WALL MOTION     LV EJECTION FRACTION: 66%  LV WALL MOTION: Normal    SUMMARY:  1. Two-vessel coronary artery disease as above that is moderate in severity.  CT  FFR is  normal..  2. There is mitral annular calcification and aortic sclerosis.  The right atrium  is enlarged.  There is left ventricular hypertrophy.  There is a moderate to  large circumferential pericardial effusion.  3. Normal left ventricular wall motion and systolic function.  4. Coronary calcium score 313, 96th percentile     TTE (3/13/2020, outside study):  · The study quality was good.   · The left ventricle is normal in size. Top normal left ventricular wall   thickness. There are no segmental wall motion abnormalities. The left   ventricular ejection fraction is 55-60% by visual estimate and 3D   echocardiography. Normal left ventricular ejection fraction.   · There is grade I (mild) LV diastolic dysfunction.   · The right ventricle is normal in size. There is normal function of the   right ventricle.   · The right atrium is mildly dilated.   · There is mild mitral valve anterior leaflet thickening. There is mild   mitral valve leaflet calcification. There is flattening of the mitral   leaflets without raphael prolapse. There is trace mitral regurgitation.   · The aortic valve is trileaflet. There is mild thickening of the aortic   valve. There is mild calcification of the aortic valve. There is no aortic   /stenosis. There is trace aortic regurgitation.   · There is mild tricuspid valve leaflet thickening. There is mild to   moderate tricuspid regurgitation. The pulmonary artery systolic pressure   is moderately elevated. Pulmonary artery systolic pressure measures 50   mmHg. There is mid-systolic notching of the RVOT VTI consistent with   elevated pulmonary vascular resistance.   · The inferior vena cava is normal in size (diameter <21 mm) and decreases   >50% in size with inspiration, suggesting a normal right atrial pressure   of 3 mmHg (range 0-5 mm Hg).   · Trivial loculated pericardial effusion present adjacent to the right   ventricle and adjacent to the right atrium.          Assessment / Plan:     1.  Musculoskeletal Pain Including Chest Pain   -Echocardiogram 4/10 showed overall small-to-moderate sized, circumferential pericardial effusion similar to 3/4/2024 study  - Suspect this is auto-immune disease related inflammation; Recently admitted (3/2024) and treated for pericardial effusion treated with Colchicine and steroid taper.   -Continue Colchicine 0.3 mg daily and Prednisone with slow taper (20 mg x2 weeks, then decrease to 15 mg x2 weeks, 10 mg x2 weeks, then decrease by 1 mg every 2 weeks)  - CCTA revealed moderate non-obstructive CAD, calcium score of 313.     2. Pericardial Effusion  - Stable small-to-moderate in size on echo from 4/10 without clinical tamponade  - Will recheck TTE at one month if no interim clinical change   -Continue Colchicine 0.3 mg daily and Prednisone with slow taper (20 mg x2 weeks, then decrease to 15 mg x2 weeks, 10 mg x2 weeks, then decrease by 1 mg every 2 weeks)    3. Pulmonary Hypertension (WHO Group I, NYHA/WHO FC III):   - Occurring with the background of Systemic Scleroderma with ILD. August 2022 TTE showed normal RV size and function, but progressive exertional decline, worsened DLCO and resting tachycardia suggest there may be progression of her pulmonary vascular disease and limitation of cardiac output. This was proven with 11/2022 RHC showing mean PA 35 mmHg (with PCWP 13 mmHg) and PVR 4.3 Wood units. June 2023 TTE showed increase in RVSP. New TTE with RVSP decreased to 40s (even with higher estimated RAP).  - Continue uninterrupted Sildenafil and Macitentan.     4. HTN:   - Mostly controlled  - Continued Amlodipine.   - Would start HCTZ as second agent, if needed    5. Systemic Scleroderma / Raynaud's Disease:   - Per Rheumatology (Outpatient: Dr. Trevizo).   - She has not tolerated trials of Mycophenolate.   - She received first dose of Tocilizumab today     6. ILD:   - Per Pulmonology and Rheumatology (Dr. Trevizo) outpatient.   - She has not tolerated trials of  Mycophenolate.   - She received first dose of Tocilizumab today    7. PE:   - She has had elevated D-dimer and elevated probability on V/Q scans, but CTA Chest has consistently not shown evidence for acute or chronic thromboembolism. Apixaban has been discontinued.     8. CAD  - LAD and RCA non-obstructive disease on 3/7 CCTA  - LDL goal < 70  -She is allergic to ASA  -At last admission she refused Clopidogrel and Atorvastatin. I spoke to her at length about these agents and indication today.     9. Lung nodule - CTA chest revealed 16 mm left lower lobe masslike density which may represent atelectasis, lung cancer or lymphoma  Pulmonology consulted      Timothy Arvizu, DO

## 2024-04-20 ENCOUNTER — TELEPHONE (OUTPATIENT)
Dept: CARDIOLOGY | Facility: CLINIC | Age: 64
End: 2024-04-20
Payer: COMMERCIAL

## 2024-04-20 NOTE — TELEPHONE ENCOUNTER
Hi -     Can you please schedule hospital follow-up appointment? Discharged 4/19.     Thanks,     Timothy

## 2024-05-21 ENCOUNTER — APPOINTMENT (EMERGENCY)
Dept: RADIOLOGY | Facility: HOSPITAL | Age: 64
DRG: 291 | End: 2024-05-21
Attending: EMERGENCY MEDICINE
Payer: COMMERCIAL

## 2024-05-21 ENCOUNTER — HOSPITAL ENCOUNTER (INPATIENT)
Facility: HOSPITAL | Age: 64
LOS: 2 days | Discharge: HOME HEALTH | DRG: 291 | End: 2024-05-24
Attending: EMERGENCY MEDICINE | Admitting: STUDENT IN AN ORGANIZED HEALTH CARE EDUCATION/TRAINING PROGRAM
Payer: COMMERCIAL

## 2024-05-21 DIAGNOSIS — R00.2 PALPITATIONS: ICD-10-CM

## 2024-05-21 DIAGNOSIS — R53.1 WEAKNESS: ICD-10-CM

## 2024-05-21 DIAGNOSIS — R04.2 HEMOPTYSIS: ICD-10-CM

## 2024-05-21 DIAGNOSIS — I31.39 PERICARDIAL EFFUSION: ICD-10-CM

## 2024-05-21 DIAGNOSIS — I47.10 SVT (SUPRAVENTRICULAR TACHYCARDIA) (CMS/HCC): ICD-10-CM

## 2024-05-21 DIAGNOSIS — R07.9 CHEST PAIN, UNSPECIFIED TYPE: Primary | ICD-10-CM

## 2024-05-21 DIAGNOSIS — M34.9 SCLERODERMA (CMS/HCC): Primary | ICD-10-CM

## 2024-05-21 DIAGNOSIS — D50.9 IRON DEFICIENCY ANEMIA, UNSPECIFIED IRON DEFICIENCY ANEMIA TYPE: ICD-10-CM

## 2024-05-21 LAB
ANION GAP SERPL CALC-SCNC: 10 MEQ/L (ref 3–15)
ATRIAL RATE: 125
BACTERIA URNS QL MICRO: ABNORMAL /HPF
BASOPHILS # BLD: 0.01 K/UL (ref 0.01–0.1)
BASOPHILS NFR BLD: 0.1 %
BILIRUB UR QL STRIP.AUTO: NEGATIVE MG/DL
BUN SERPL-MCNC: 20 MG/DL (ref 7–25)
CALCIUM SERPL-MCNC: 9.5 MG/DL (ref 8.6–10.3)
CHLORIDE SERPL-SCNC: 105 MEQ/L (ref 98–107)
CLARITY UR REFRACT.AUTO: CLEAR
CO2 SERPL-SCNC: 23 MEQ/L (ref 21–31)
COLOR UR AUTO: YELLOW
CREAT SERPL-MCNC: 1 MG/DL (ref 0.6–1.2)
DIFFERENTIAL METHOD BLD: ABNORMAL
EGFRCR SERPLBLD CKD-EPI 2021: >60 ML/MIN/1.73M*2
EOSINOPHIL # BLD: 0.02 K/UL (ref 0.04–0.36)
EOSINOPHIL NFR BLD: 0.2 %
ERYTHROCYTE [DISTWIDTH] IN BLOOD BY AUTOMATED COUNT: 16.6 % (ref 11.7–14.4)
GLUCOSE SERPL-MCNC: 135 MG/DL (ref 70–99)
GLUCOSE UR STRIP.AUTO-MCNC: NEGATIVE MG/DL
HCT VFR BLD AUTO: 40.2 % (ref 35–45)
HGB BLD-MCNC: 12 G/DL (ref 11.8–15.7)
HGB UR QL STRIP.AUTO: NEGATIVE
HYALINE CASTS #/AREA URNS LPF: ABNORMAL /LPF
IMM GRANULOCYTES # BLD AUTO: 0.05 K/UL (ref 0–0.08)
IMM GRANULOCYTES NFR BLD AUTO: 0.4 %
KETONES UR STRIP.AUTO-MCNC: NEGATIVE MG/DL
LEUKOCYTE ESTERASE UR QL STRIP.AUTO: ABNORMAL
LYMPHOCYTES # BLD: 0.58 K/UL (ref 1.2–3.5)
LYMPHOCYTES NFR BLD: 4.9 %
MAGNESIUM SERPL-MCNC: 1.7 MG/DL (ref 1.8–2.5)
MCH RBC QN AUTO: 27.1 PG (ref 28–33.2)
MCHC RBC AUTO-ENTMCNC: 29.9 G/DL (ref 32.2–35.5)
MCV RBC AUTO: 90.7 FL (ref 83–98)
MONOCYTES # BLD: 0.18 K/UL (ref 0.28–0.8)
MONOCYTES NFR BLD: 1.5 %
MUCOUS THREADS URNS QL MICRO: ABNORMAL /LPF
NEUTROPHILS # BLD: 10.92 K/UL (ref 1.7–7)
NEUTS SEG NFR BLD: 92.9 %
NITRITE UR QL STRIP.AUTO: NEGATIVE
NRBC BLD-RTO: 0 %
P AXIS: 70
PDW BLD AUTO: 12 FL (ref 9.4–12.3)
PH UR STRIP.AUTO: 6.5 [PH]
PLATELET # BLD AUTO: 289 K/UL (ref 150–369)
POTASSIUM SERPL-SCNC: 5.3 MEQ/L (ref 3.5–5.1)
PR INTERVAL: 120
PROT UR QL STRIP.AUTO: 2
QRS DURATION: 74
QT INTERVAL: 320
QTC CALCULATION(BAZETT): 461
R AXIS: 33
RBC # BLD AUTO: 4.43 M/UL (ref 3.93–5.22)
RBC #/AREA URNS HPF: ABNORMAL /HPF
SODIUM SERPL-SCNC: 138 MEQ/L (ref 136–145)
SP GR UR REFRACT.AUTO: 1.02
SQUAMOUS URNS QL MICRO: ABNORMAL /HPF
T WAVE AXIS: 116
TROPONIN I SERPL HS-MCNC: 24.1 PG/ML
TROPONIN I SERPL HS-MCNC: 32.2 PG/ML
UROBILINOGEN UR STRIP-ACNC: 0.2 EU/DL
VENTRICULAR RATE: 125
WBC # BLD AUTO: 11.76 K/UL (ref 3.8–10.5)
WBC #/AREA URNS HPF: ABNORMAL /HPF

## 2024-05-21 PROCEDURE — 36415 COLL VENOUS BLD VENIPUNCTURE: CPT

## 2024-05-21 PROCEDURE — 63700000 HC SELF-ADMINISTRABLE DRUG: Performed by: EMERGENCY MEDICINE

## 2024-05-21 PROCEDURE — 71275 CT ANGIOGRAPHY CHEST: CPT

## 2024-05-21 PROCEDURE — 84484 ASSAY OF TROPONIN QUANT: CPT | Performed by: EMERGENCY MEDICINE

## 2024-05-21 PROCEDURE — 81001 URINALYSIS AUTO W/SCOPE: CPT | Performed by: EMERGENCY MEDICINE

## 2024-05-21 PROCEDURE — 99285 EMERGENCY DEPT VISIT HI MDM: CPT | Mod: 25

## 2024-05-21 PROCEDURE — 80048 BASIC METABOLIC PNL TOTAL CA: CPT | Performed by: EMERGENCY MEDICINE

## 2024-05-21 PROCEDURE — 85025 COMPLETE CBC W/AUTO DIFF WBC: CPT | Performed by: EMERGENCY MEDICINE

## 2024-05-21 PROCEDURE — 96375 TX/PRO/DX INJ NEW DRUG ADDON: CPT

## 2024-05-21 PROCEDURE — 93005 ELECTROCARDIOGRAM TRACING: CPT

## 2024-05-21 PROCEDURE — 93010 ELECTROCARDIOGRAM REPORT: CPT | Performed by: INTERNAL MEDICINE

## 2024-05-21 PROCEDURE — 85025 COMPLETE CBC W/AUTO DIFF WBC: CPT

## 2024-05-21 PROCEDURE — 96374 THER/PROPH/DIAG INJ IV PUSH: CPT

## 2024-05-21 PROCEDURE — 84484 ASSAY OF TROPONIN QUANT: CPT | Mod: 91 | Performed by: EMERGENCY MEDICINE

## 2024-05-21 PROCEDURE — 87086 URINE CULTURE/COLONY COUNT: CPT | Performed by: EMERGENCY MEDICINE

## 2024-05-21 PROCEDURE — 71045 X-RAY EXAM CHEST 1 VIEW: CPT

## 2024-05-21 PROCEDURE — 63600000 HC DRUGS/DETAIL CODE: Mod: JZ | Performed by: EMERGENCY MEDICINE

## 2024-05-21 PROCEDURE — 83735 ASSAY OF MAGNESIUM: CPT | Performed by: EMERGENCY MEDICINE

## 2024-05-21 PROCEDURE — 63600105 HC IODINE BASED CONTRAST: Performed by: EMERGENCY MEDICINE

## 2024-05-21 PROCEDURE — 93005 ELECTROCARDIOGRAM TRACING: CPT | Performed by: EMERGENCY MEDICINE

## 2024-05-21 RX ORDER — SILDENAFIL CITRATE 20 MG/1
20 TABLET ORAL ONCE
Status: COMPLETED | OUTPATIENT
Start: 2024-05-21 | End: 2024-05-21

## 2024-05-21 RX ORDER — IOPAMIDOL 755 MG/ML
80 INJECTION, SOLUTION INTRAVASCULAR
Status: COMPLETED | OUTPATIENT
Start: 2024-05-21 | End: 2024-05-21

## 2024-05-21 RX ORDER — LIDOCAINE 560 MG/1
1 PATCH PERCUTANEOUS; TOPICAL; TRANSDERMAL ONCE
Status: COMPLETED | OUTPATIENT
Start: 2024-05-21 | End: 2024-05-22

## 2024-05-21 RX ORDER — DIPHENHYDRAMINE HCL 50 MG/ML
25 VIAL (ML) INJECTION ONCE
Status: COMPLETED | OUTPATIENT
Start: 2024-05-21 | End: 2024-05-21

## 2024-05-21 RX ORDER — ACETAMINOPHEN 325 MG/1
975 TABLET ORAL ONCE
Status: COMPLETED | OUTPATIENT
Start: 2024-05-21 | End: 2024-05-21

## 2024-05-21 RX ADMIN — DIPHENHYDRAMINE HYDROCHLORIDE 25 MG: 50 INJECTION INTRAMUSCULAR; INTRAVENOUS at 20:46

## 2024-05-21 RX ADMIN — SILDENAFIL 20 MG: 20 TABLET, FILM COATED ORAL at 21:22

## 2024-05-21 RX ADMIN — LIDOCAINE 4% 1 PATCH: 40 PATCH TOPICAL at 19:53

## 2024-05-21 RX ADMIN — METHYLPREDNISOLONE SODIUM SUCCINATE 125 MG: 125 INJECTION, POWDER, FOR SOLUTION INTRAMUSCULAR; INTRAVENOUS at 20:47

## 2024-05-21 RX ADMIN — IOPAMIDOL 80 ML: 755 INJECTION, SOLUTION INTRAVENOUS at 22:22

## 2024-05-21 RX ADMIN — ACETAMINOPHEN 975 MG: 325 TABLET ORAL at 19:54

## 2024-05-21 NOTE — ED PROVIDER NOTES
Emergency Medicine Note  HPI   HISTORY OF PRESENT ILLNESS     Dictation errors and typographical errors may be present in the document below.  Yueal in 30  RN hx/EHR reviewed  Hx obtained from PT    Patient sent to the ED by her rheumatologist due to fast heart rate and hypertension.  Patient notes that she was at a routine rheumatology appointment and then she felt her heart was racing fast and she felt very lightheaded.  She notes similar episodes in the past when she was hospitalized last 2 admissions.  She is also been having left-sided chest pain, localized to a small area in the left lower anterior chest.  Pain is worse with coughing and with breathing in.  She has had a cough for many weeks to months.  She reports 3 hemoptysis (largely clear sputum with a streak of blood) yesterday and reports has been having intermittent hemoptysis for many weeks, at times much more significant than what was yesterday.  No fever.    Past medical history is extensive including ILD, scleroderma/lupus with chronic prednisone use.  Pericardial effusion among other pathology          Patient History   PAST HISTORY     Reviewed from Nursing Triage:       Past Medical History:   Diagnosis Date   • De Quervain's tenosynovitis    • Essential (primary) hypertension    • Follicular carcinoma of thyroid (CMS/HCC)    • Gastro-esophageal reflux    • Hand ulceration (CMS/HCC)    • Hyperlipidemia, unspecified    • Interstitial lung disease (CMS/HCC)    • Leg ulcer (CMS/HCC)    • Lung nodule    • Lupus (CMS/HCC)    • Osteomyelitis of hand (CMS/HCC)    • Osteoporosis    • Pulmonary hypertension (CMS/HCC)    • Raynaud's disease     Severe   • Reflux esophagitis    • Scleroderma (CMS/HCC)    • Screening mammogram for breast cancer 05/04/2021    BI-RADS category: 1 - Negative (care everywhere @ [place])       Past Surgical History:   Procedure Laterality Date   • CARDIAC CATHETERIZATION     • COLONOSCOPY     • HERNIA REPAIR         Family History    Problem Relation Age of Onset   • Heart disease Biological Brother         stent   • Breast cancer Niece    • Cervical cancer Neg Hx    • Uterine cancer Neg Hx    • Colon cancer Neg Hx    • Ovarian cancer Neg Hx        Social History     Tobacco Use   • Smoking status: Former     Types: Cigarettes     Quit date: 9/15/2005     Years since quittin.6   • Smokeless tobacco: Never   • Tobacco comments:     Would smoke 1/2 of 1/2 PPD, quit in    Vaping Use   • Vaping Use: Never used   Substance Use Topics   • Alcohol use: Never   • Drug use: Never         Review of Systems   REVIEW OF SYSTEMS     Review of Systems      VITALS     ED Vitals      Date/Time Temp Pulse Resp BP SpO2 [facility]   24 1803 -- 105 20 162/74 93 % DNG   24 1700 -- 101 25 145/68 95 % DNG   24 1600 -- 94 29 163/71 93 % DNG   24 1500 -- 109 22 149/70 98 % DNG   24 1348 -- 113 21 162/75 99 % DNG   24 1308 36.6 °C (97.9 °F) 135 22 211/94 97 % CBC                         Physical Exam   PHYSICAL EXAM     Physical Exam  Vitals and nursing note reviewed.   Constitutional:       Appearance: She is well-developed.   HENT:      Head: Normocephalic.      Right Ear: External ear normal.      Left Ear: External ear normal.   Eyes:      General: No scleral icterus.     Conjunctiva/sclera: Conjunctivae normal.   Neck:      Trachea: No tracheal deviation.   Cardiovascular:      Rate and Rhythm: Normal rate and regular rhythm.      Comments: 2+radial  b/l  Pulmonary:      Effort: Pulmonary effort is normal. No respiratory distress.      Breath sounds: Normal breath sounds.   Abdominal:      Palpations: Abdomen is soft. There is no mass.      Tenderness: There is no abdominal tenderness. There is no guarding or rebound.   Musculoskeletal:         General: Tenderness (Focal left lower chest wall tenderness was reproduces complaint of chest pain) present. Normal range of motion.      Cervical back: Neck supple.      Comments: No  LE assymetry/cords/tenderness   Skin:     General: Skin is warm and dry.   Neurological:      Mental Status: She is alert.      Comments: Moves all extremities  Communicates clearly           PROCEDURES     Critical Care    Performed by: Brady Miller MD  Authorized by: Brady Miller MD    Critical care provider statement:     Critical care time (minutes):  20    Critical care time was exclusive of:  Separately billable procedures and treating other patients    Critical care was necessary to treat or prevent imminent or life-threatening deterioration of the following conditions: Initial concern for pericardial tamponade.    Critical care was time spent personally by me on the following activities:  Ordering and performing treatments and interventions, ordering and review of laboratory studies, ordering and review of radiographic studies, pulse oximetry, re-evaluation of patient's condition, review of old charts, obtaining history from patient or surrogate, examination of patient, evaluation of patient's response to treatment and development of treatment plan with patient or surrogate       DATA     Results       Procedure Component Value Units Date/Time    UA w/ reflex culture (ED Only) [[id number]]  (Abnormal) Collected: 05/21/24 1506    Specimen: Urine, Clean Catch Updated: 05/21/24 1528    Narrative:      The following orders were created for panel order UA w/ reflex culture (ED Only).  Procedure                               Abnormality         Status                     ---------                               -----------         ------                     UA Reflex to Culture (Ma...[[id number]]  Abnormal            Final result               UA Microscopic[[id number]]               Abnormal            Final result                 Please view results for these tests on the individual orders.    UA Microscopic [[id number]]  (Abnormal) Collected: 05/21/24 1506    Specimen: Urine, Clean Catch Updated:  05/21/24 1528     RBC, Urine 0 TO 4 /HPF      WBC, Urine 4 TO 10 /HPF      Squamous Epithelial Rare /hpf      Hyaline Cast None Seen /lpf      Bacteria, Urine None Seen /HPF      Mucus Rare /LPF     UA Reflex to Culture (Macroscopic) [[id number]]  (Abnormal) Collected: 05/21/24 1506    Specimen: Urine, Clean Catch Updated: 05/21/24 1519     Color, Urine Yellow     Clarity, Urine Clear     Specific Gravity, Urine 1.016     pH, Urine 6.5     Leukocyte Esterase Trace     Nitrite, Urine Negative     Protein, Urine +2     Glucose, Urine Negative mg/dL      Ketones, Urine Negative mg/dL      Urobilinogen, Urine 0.2 EU/dL      Bilirubin, Urine Negative mg/dL      Blood, Urine Negative     Comment: The sensitivity of the occult blood test is equivalent to approximately 4 intact RBC/HPF.       [place] Draw Panel [[id number]] Collected: 05/21/24 1335    Specimen: Blood, Venous Updated: 05/21/24 1501    Narrative:      The following orders were created for panel order [place] Draw Panel.  Procedure                               Abnormality         Status                     ---------                               -----------         ------                     RAINBOW LAVENDER[[id number]]                                 Final result               RAINBOW LT BLUE[[id number]]                                  In process                 RAINBOW GOLD[[id number]]                                     In process                   Please view results for these tests on the individual orders.    RAINBOW LAVENDER [[id number]] Collected: 05/21/24 1335    Specimen: Blood, Venous Updated: 05/21/24 1501    Basic metabolic panel [[id number]]  (Abnormal) Collected: 05/21/24 1335    Specimen: Blood, Venous Updated: 05/21/24 1451     Sodium 138 mEQ/L      Comment: Moderate hemolysis, results may be affected.         Potassium 5.3 mEQ/L      Comment: Results obtained on plasma. Plasma Potassium values may be up to 0.4 mEQ/L less than serum values. The  differences may be greater for patients with high platelet or white cell counts.  Moderate hemolysis, results may be affected.         Chloride 105 mEQ/L      CO2 23 mEQ/L      BUN 20 mg/dL      Creatinine 1.0 mg/dL      Glucose 135 mg/dL      Calcium 9.5 mg/dL      eGFR >60.0 mL/min/1.73m*2      Comment: Calculation based on the Chronic Kidney Disease Epidemiology Collaboration (CKD-EPI) equation refit without adjustment for race.        Anion Gap 10 mEQ/L     HS Troponin (with 2 hour reflex) [[id number]]  (Abnormal) Collected: 05/21/24 1335    Specimen: Blood, Venous Updated: 05/21/24 1442     High Sens Troponin I 32.2 pg/mL     CBC and differential [[id number]]  (Abnormal) Collected: 05/21/24 1335    Specimen: Blood, Venous Updated: 05/21/24 1350     WBC 11.76 K/uL      RBC 4.43 M/uL      Hemoglobin 12.0 g/dL      Hematocrit 40.2 %      MCV 90.7 fL      MCH 27.1 pg      MCHC 29.9 g/dL      RDW 16.6 %      Platelets 289 K/uL      MPV 12.0 fL      Differential Type Auto     nRBC 0.0 %      Immature Granulocytes 0.4 %      Neutrophils 92.9 %      Lymphocytes 4.9 %      Monocytes 1.5 %      Eosinophils 0.2 %      Basophils 0.1 %      Immature Granulocytes, Absolute 0.05 K/uL      Neutrophils, Absolute 10.92 K/uL      Lymphocytes, Absolute 0.58 K/uL      Monocytes, Absolute 0.18 K/uL      Eosinophils, Absolute 0.02 K/uL      Basophils, Absolute 0.01 K/uL     RAINBOW GOLD [[id number]] Collected: 05/21/24 1335    Specimen: Blood, Venous Updated: 05/21/24 1345    MING DIOP [[id number]] Collected: 05/21/24 1335    Specimen: Blood, Venous Updated: 05/21/24 1344            Imaging Results              X-RAY CHEST 1 VIEW (In process)                     ECG 12 lead      ECG 12 lead    (Results Pending)       Scoring tools                                  ED Course & MDM   MDM / ED COURSE / CLINICAL IMPRESSION / DISPO     Medical Decision Making  Reviewed telemetry shortly after patient arrived revealed a narrow complex  regular tachycardia, I suspect SVT.  Patient reports she felt lightheaded during this episode.    Suspect that she had a similar episode at her rheumatologist office.  Labs noted.  Will check CT chest rule out PE given hemoptysis, tachycardia, etc   regarding chest pain, it is mostly muscle skeletal features but rule out PE as etiology.  Pericarditis a consideration as well. Doubt acs;      Doubt pericardial tamponade;    Problems Addressed:  Chest pain, unspecified type: acute illness or injury  Hemoptysis: acute illness or injury  Palpitations: acute illness or injury with systemic symptoms  Pericardial effusion: chronic illness or injury  Weakness: acute illness or injury with systemic symptoms    Amount and/or Complexity of Data Reviewed  External Data Reviewed: notes.     Details: 04/2024 DC SUMMARY  Acute chest pain     Secondary Discharge Diagnoses  Active Hospital Problems    Diagnosis Date Noted  · Acute chest pain 04/16/2024      Priority: High  · Lung mass 04/16/2024      Priority: Medium  · Pericardial effusion 04/13/2023      Priority: Medium  · CAD (coronary artery disease) 04/18/2024  · GERD (gastroesophageal reflux disease) 04/16/2024  · Hypothyroid 03/04/2024  · Scleroderma (CMS/HCC) 04/06/2023  · Interstitial lung disease (CMS/HCC) 11/28/2022  · Essential (primary) hypertension 08/09/2022  · Pulmonary hypertension (CMS/HCC) 08/09/      Labs: ordered.  Radiology: ordered and independent interpretation performed.  ECG/medicine tests: ordered.    Risk  OTC drugs.  Prescription drug management.        ED Course as of 05/22/24 0014   Tue May 21, 2024   2014 Focused Echo performed by me using phased array transducer  Indication: eval for pericardial teamponade  Views/windows: subcostal, psla/pssa, a4c  Findings:  Visualization was adequate to evaluate the following  EF- nl  Pericardial effusion: circumferential effusion; max thickness appx. 6mm and seen I region posterior to LV  No RV diastolic  collapse;  RV:LV end-diastolic diameter ratio- <1  IVC max 10 w/ appx. 75%  inspiratory collapse visualized  Impression-circumferential pericardial effusion.  Lack of plethoric IVC make the diagnosis of pericardial tamponade highly unlikely.  There is no RV diastolic collapse [JF]   2030 Review of tele from 16:35 - narrow complex regular tachycardia, suspect svt [JF]   2148 S/o to Dr Barclay w/ plan:  -f/u CTA chest to r/o PE; f/u Mg 2+ level; determine disposition [JF]   2329 While walking nurse reports patient heart rate went up to 130 bpm and desated down to 80% sat. [MS]   2331 CT scan with no PE.  There are findings of pulmonary hypertension, lots of reticular markings at the base which radiologist feels is more c/w fibrosis.  Will discuss withe [facility] for admit. [MS]   Wed May 22, 2024   0014 Discussed with medicine attending and admitting resident. [MS]      ED Course User Index  [JF] Brady Miller MD  [MS] Arturo Bazzi MD     Clinical Impression      None                 Brady Miller MD  05/21/24 2215       Brady Miller MD  05/26/24 1707

## 2024-05-22 PROBLEM — J96.01 ACUTE HYPOXIC RESPIRATORY FAILURE (CMS/HCC): Status: ACTIVE | Noted: 2024-05-22

## 2024-05-22 PROBLEM — S81.809A MULTIPLE OPEN WOUNDS OF LOWER EXTREMITY: Status: ACTIVE | Noted: 2024-05-22

## 2024-05-22 PROBLEM — R07.9 CHEST PAIN, UNSPECIFIED TYPE: Status: ACTIVE | Noted: 2024-05-22

## 2024-05-22 LAB
ANION GAP SERPL CALC-SCNC: 12 MEQ/L (ref 3–15)
ATRIAL RATE: 93
BACTERIA UR CULT: ABNORMAL
BACTERIA UR CULT: ABNORMAL
BASOPHILS # BLD: 0 K/UL (ref 0.01–0.1)
BASOPHILS NFR BLD: 0 %
BNP SERPL-MCNC: 352 PG/ML
BUN SERPL-MCNC: 22 MG/DL (ref 7–25)
CALCIUM SERPL-MCNC: 8.8 MG/DL (ref 8.6–10.3)
CHLORIDE SERPL-SCNC: 107 MEQ/L (ref 98–107)
CO2 SERPL-SCNC: 21 MEQ/L (ref 21–31)
CREAT SERPL-MCNC: 1 MG/DL (ref 0.6–1.2)
DIFFERENTIAL METHOD BLD: ABNORMAL
EGFRCR SERPLBLD CKD-EPI 2021: >60 ML/MIN/1.73M*2
EOSINOPHIL # BLD: 0 K/UL (ref 0.04–0.36)
EOSINOPHIL NFR BLD: 0 %
ERYTHROCYTE [DISTWIDTH] IN BLOOD BY AUTOMATED COUNT: 16.4 % (ref 11.7–14.4)
GLUCOSE SERPL-MCNC: 129 MG/DL (ref 70–99)
GRAM STN SPEC: NORMAL
HCT VFR BLD AUTO: 32.8 % (ref 35–45)
HGB BLD-MCNC: 10 G/DL (ref 11.8–15.7)
IMM GRANULOCYTES # BLD AUTO: 0.03 K/UL (ref 0–0.08)
IMM GRANULOCYTES NFR BLD AUTO: 0.4 %
LYMPHOCYTES # BLD: 0.42 K/UL (ref 1.2–3.5)
LYMPHOCYTES NFR BLD: 5.6 %
MAGNESIUM SERPL-MCNC: 1.8 MG/DL (ref 1.8–2.5)
MCH RBC QN AUTO: 27.1 PG (ref 28–33.2)
MCHC RBC AUTO-ENTMCNC: 30.5 G/DL (ref 32.2–35.5)
MCV RBC AUTO: 88.9 FL (ref 83–98)
MONOCYTES # BLD: 0.03 K/UL (ref 0.28–0.8)
MONOCYTES NFR BLD: 0.4 %
MRSA DNA SPEC QL NAA+PROBE: NEGATIVE
NEUTROPHILS # BLD: 7.02 K/UL (ref 1.7–7)
NEUTS SEG NFR BLD: 93.6 %
NRBC BLD-RTO: 0 %
P AXIS: 46
PDW BLD AUTO: 12 FL (ref 9.4–12.3)
PLATELET # BLD AUTO: 253 K/UL (ref 150–369)
POTASSIUM SERPL-SCNC: 4.6 MEQ/L (ref 3.5–5.1)
PR INTERVAL: 138
QRS DURATION: 74
QT INTERVAL: 358
QTC CALCULATION(BAZETT): 445
R AXIS: 14
RBC # BLD AUTO: 3.69 M/UL (ref 3.93–5.22)
SODIUM SERPL-SCNC: 140 MEQ/L (ref 136–145)
T WAVE AXIS: 110
TSH SERPL DL<=0.05 MIU/L-ACNC: 1.03 MIU/L (ref 0.34–5.6)
VENTRICULAR RATE: 93
VIT B12 SERPL-MCNC: 611 PG/ML (ref 180–914)
WBC # BLD AUTO: 7.5 K/UL (ref 3.8–10.5)

## 2024-05-22 PROCEDURE — 93010 ELECTROCARDIOGRAM REPORT: CPT | Performed by: INTERNAL MEDICINE

## 2024-05-22 PROCEDURE — 82607 VITAMIN B-12: CPT

## 2024-05-22 PROCEDURE — 25000000 HC PHARMACY GENERAL

## 2024-05-22 PROCEDURE — 36415 COLL VENOUS BLD VENIPUNCTURE: CPT

## 2024-05-22 PROCEDURE — 83880 ASSAY OF NATRIURETIC PEPTIDE: CPT

## 2024-05-22 PROCEDURE — 99223 1ST HOSP IP/OBS HIGH 75: CPT | Performed by: INTERNAL MEDICINE

## 2024-05-22 PROCEDURE — 63700000 HC SELF-ADMINISTRABLE DRUG: Performed by: STUDENT IN AN ORGANIZED HEALTH CARE EDUCATION/TRAINING PROGRAM

## 2024-05-22 PROCEDURE — 97166 OT EVAL MOD COMPLEX 45 MIN: CPT | Mod: GO

## 2024-05-22 PROCEDURE — 99999 PR OFFICE/OUTPT VISIT,PROCEDURE ONLY: CPT | Performed by: HOSPITALIST

## 2024-05-22 PROCEDURE — 1123F ACP DISCUSS/DSCN MKR DOCD: CPT | Performed by: STUDENT IN AN ORGANIZED HEALTH CARE EDUCATION/TRAINING PROGRAM

## 2024-05-22 PROCEDURE — 25800000 HC PHARMACY IV SOLUTIONS

## 2024-05-22 PROCEDURE — 63700000 HC SELF-ADMINISTRABLE DRUG

## 2024-05-22 PROCEDURE — 80048 BASIC METABOLIC PNL TOTAL CA: CPT

## 2024-05-22 PROCEDURE — 87641 MR-STAPH DNA AMP PROBE: CPT

## 2024-05-22 PROCEDURE — 21400000 HC ROOM AND CARE CCU/INTERMEDIATE

## 2024-05-22 PROCEDURE — 87040 BLOOD CULTURE FOR BACTERIA: CPT

## 2024-05-22 PROCEDURE — 99223 1ST HOSP IP/OBS HIGH 75: CPT | Performed by: STUDENT IN AN ORGANIZED HEALTH CARE EDUCATION/TRAINING PROGRAM

## 2024-05-22 PROCEDURE — 85025 COMPLETE CBC W/AUTO DIFF WBC: CPT

## 2024-05-22 PROCEDURE — 83735 ASSAY OF MAGNESIUM: CPT

## 2024-05-22 PROCEDURE — 87205 SMEAR GRAM STAIN: CPT

## 2024-05-22 PROCEDURE — 84443 ASSAY THYROID STIM HORMONE: CPT

## 2024-05-22 PROCEDURE — 63600000 HC DRUGS/DETAIL CODE: Mod: JZ

## 2024-05-22 RX ORDER — PREDNISONE 20 MG/1
40 TABLET ORAL DAILY
Status: DISCONTINUED | OUTPATIENT
Start: 2024-05-23 | End: 2024-05-22

## 2024-05-22 RX ORDER — SILDENAFIL CITRATE 20 MG/1
20 TABLET ORAL 3 TIMES DAILY
Status: DISCONTINUED | OUTPATIENT
Start: 2024-05-22 | End: 2024-05-24 | Stop reason: HOSPADM

## 2024-05-22 RX ORDER — AMLODIPINE BESYLATE 10 MG/1
10 TABLET ORAL EVERY MORNING
Status: DISCONTINUED | OUTPATIENT
Start: 2024-05-22 | End: 2024-05-24 | Stop reason: HOSPADM

## 2024-05-22 RX ORDER — COLCHICINE 0.6 MG/1
0.3 TABLET ORAL DAILY
Status: DISCONTINUED | OUTPATIENT
Start: 2024-05-22 | End: 2024-05-24 | Stop reason: HOSPADM

## 2024-05-22 RX ORDER — ACETAMINOPHEN 325 MG/1
650 TABLET ORAL EVERY 6 HOURS PRN
Status: DISCONTINUED | OUTPATIENT
Start: 2024-05-22 | End: 2024-05-24 | Stop reason: HOSPADM

## 2024-05-22 RX ORDER — DEXTROSE 40 %
15-30 GEL (GRAM) ORAL AS NEEDED
Status: DISCONTINUED | OUTPATIENT
Start: 2024-05-22 | End: 2024-05-24 | Stop reason: HOSPADM

## 2024-05-22 RX ORDER — PANTOPRAZOLE SODIUM 40 MG/1
40 TABLET, DELAYED RELEASE ORAL DAILY
Status: DISCONTINUED | OUTPATIENT
Start: 2024-05-22 | End: 2024-05-24 | Stop reason: HOSPADM

## 2024-05-22 RX ORDER — CHOLECALCIFEROL (VITAMIN D3) 25 MCG
1000 TABLET ORAL DAILY
Status: DISCONTINUED | OUTPATIENT
Start: 2024-05-22 | End: 2024-05-24 | Stop reason: HOSPADM

## 2024-05-22 RX ORDER — DOCUSATE SODIUM 100 MG/1
100 CAPSULE, LIQUID FILLED ORAL DAILY PRN
Status: DISCONTINUED | OUTPATIENT
Start: 2024-05-22 | End: 2024-05-24 | Stop reason: HOSPADM

## 2024-05-22 RX ORDER — LANOLIN ALCOHOL/MO/W.PET/CERES
1000 CREAM (GRAM) TOPICAL DAILY
Status: DISCONTINUED | OUTPATIENT
Start: 2024-05-22 | End: 2024-05-24 | Stop reason: HOSPADM

## 2024-05-22 RX ORDER — IBUPROFEN 200 MG
16-32 TABLET ORAL AS NEEDED
Status: DISCONTINUED | OUTPATIENT
Start: 2024-05-22 | End: 2024-05-24 | Stop reason: HOSPADM

## 2024-05-22 RX ORDER — FUROSEMIDE 10 MG/ML
20 INJECTION INTRAMUSCULAR; INTRAVENOUS ONCE
Status: COMPLETED | OUTPATIENT
Start: 2024-05-22 | End: 2024-05-22

## 2024-05-22 RX ORDER — DEXTROSE 50 % IN WATER (D50W) INTRAVENOUS SYRINGE
25 AS NEEDED
Status: DISCONTINUED | OUTPATIENT
Start: 2024-05-22 | End: 2024-05-24 | Stop reason: HOSPADM

## 2024-05-22 RX ORDER — BENZONATATE 100 MG/1
200 CAPSULE ORAL 3 TIMES DAILY PRN
Status: DISCONTINUED | OUTPATIENT
Start: 2024-05-22 | End: 2024-05-24 | Stop reason: HOSPADM

## 2024-05-22 RX ORDER — IPRATROPIUM BROMIDE AND ALBUTEROL SULFATE 2.5; .5 MG/3ML; MG/3ML
3 SOLUTION RESPIRATORY (INHALATION) EVERY 6 HOURS
Status: DISCONTINUED | OUTPATIENT
Start: 2024-05-22 | End: 2024-05-24 | Stop reason: HOSPADM

## 2024-05-22 RX ORDER — LEVOTHYROXINE SODIUM 100 UG/1
100 TABLET ORAL
Status: DISCONTINUED | OUTPATIENT
Start: 2024-05-22 | End: 2024-05-24 | Stop reason: HOSPADM

## 2024-05-22 RX ORDER — MECLIZINE HCL 12.5 MG 12.5 MG/1
25 TABLET ORAL DAILY PRN
Status: DISCONTINUED | OUTPATIENT
Start: 2024-05-22 | End: 2024-05-24 | Stop reason: HOSPADM

## 2024-05-22 RX ORDER — ENOXAPARIN SODIUM 100 MG/ML
40 INJECTION SUBCUTANEOUS
Status: DISCONTINUED | OUTPATIENT
Start: 2024-05-22 | End: 2024-05-24 | Stop reason: HOSPADM

## 2024-05-22 RX ORDER — ATORVASTATIN CALCIUM 40 MG/1
40 TABLET, FILM COATED ORAL
Status: DISCONTINUED | OUTPATIENT
Start: 2024-05-22 | End: 2024-05-24 | Stop reason: HOSPADM

## 2024-05-22 RX ADMIN — AMLODIPINE BESYLATE 10 MG: 10 TABLET ORAL at 08:44

## 2024-05-22 RX ADMIN — SILDENAFIL 20 MG: 20 TABLET, FILM COATED ORAL at 14:53

## 2024-05-22 RX ADMIN — COLCHICINE 0.3 MG: 0.6 TABLET ORAL at 08:44

## 2024-05-22 RX ADMIN — LEVOTHYROXINE SODIUM 100 MCG: 0.1 TABLET ORAL at 05:05

## 2024-05-22 RX ADMIN — PANTOPRAZOLE SODIUM 40 MG: 40 TABLET, DELAYED RELEASE ORAL at 08:44

## 2024-05-22 RX ADMIN — Medication 1000 UNITS: at 08:44

## 2024-05-22 RX ADMIN — FUROSEMIDE 20 MG: 10 INJECTION, SOLUTION INTRAMUSCULAR; INTRAVENOUS at 20:05

## 2024-05-22 RX ADMIN — ENOXAPARIN SODIUM 40 MG: 40 INJECTION SUBCUTANEOUS at 17:57

## 2024-05-22 RX ADMIN — ATORVASTATIN CALCIUM 40 MG: 40 TABLET, FILM COATED ORAL at 17:57

## 2024-05-22 RX ADMIN — SILDENAFIL 20 MG: 20 TABLET, FILM COATED ORAL at 08:44

## 2024-05-22 RX ADMIN — BENZONATATE 200 MG: 100 CAPSULE ORAL at 12:15

## 2024-05-22 RX ADMIN — ACETAMINOPHEN 650 MG: 325 TABLET ORAL at 15:40

## 2024-05-22 RX ADMIN — CYANOCOBALAMIN TAB 1000 MCG 1000 MCG: 1000 TAB at 08:44

## 2024-05-22 RX ADMIN — VANCOMYCIN HYDROCHLORIDE 1000 MG: 1 INJECTION, POWDER, LYOPHILIZED, FOR SOLUTION INTRAVENOUS at 06:46

## 2024-05-22 RX ADMIN — IPRATROPIUM BROMIDE AND ALBUTEROL SULFATE 3 ML: .5; 3 SOLUTION RESPIRATORY (INHALATION) at 02:13

## 2024-05-22 RX ADMIN — MAGNESIUM SULFATE HEPTAHYDRATE 2 G: 40 INJECTION, SOLUTION INTRAVENOUS at 08:43

## 2024-05-22 RX ADMIN — SILDENAFIL 20 MG: 20 TABLET, FILM COATED ORAL at 20:05

## 2024-05-22 RX ADMIN — CEFEPIME 2 G: 2 INJECTION, POWDER, FOR SOLUTION INTRAVENOUS at 06:06

## 2024-05-22 RX ADMIN — CEFEPIME 2 G: 2 INJECTION, POWDER, FOR SOLUTION INTRAVENOUS at 17:56

## 2024-05-22 RX ADMIN — METHYLPREDNISOLONE SODIUM SUCCINATE 40 MG: 40 INJECTION, POWDER, FOR SOLUTION INTRAMUSCULAR; INTRAVENOUS at 05:05

## 2024-05-22 ASSESSMENT — COGNITIVE AND FUNCTIONAL STATUS - GENERAL
AFFECT: WNL
EATING MEALS: 4 - NONE
MOVING TO AND FROM BED TO CHAIR: 4 - NONE
TOILETING: 3 - A LITTLE
WALKING IN HOSPITAL ROOM: 3 - A LITTLE
STANDING UP FROM CHAIR USING ARMS: 3 - A LITTLE
MOVING TO AND FROM BED TO CHAIR: 4 - NONE
DRESSING REGULAR LOWER BODY CLOTHING: 4 - NONE
STANDING UP FROM CHAIR USING ARMS: 4 - NONE
CLIMB 3 TO 5 STEPS WITH RAILING: 3 - A LITTLE
WALKING IN HOSPITAL ROOM: 3 - A LITTLE
DRESSING REGULAR UPPER BODY CLOTHING: 3 - A LITTLE
MOVING TO AND FROM BED TO CHAIR: 4 - NONE
HELP NEEDED FOR BATHING: 3 - A LITTLE
CLIMB 3 TO 5 STEPS WITH RAILING: 3 - A LITTLE
CLIMB 3 TO 5 STEPS WITH RAILING: 3 - A LITTLE
STANDING UP FROM CHAIR USING ARMS: 3 - A LITTLE
HELP NEEDED FOR PERSONAL GROOMING: 4 - NONE
WALKING IN HOSPITAL ROOM: 3 - A LITTLE

## 2024-05-22 ASSESSMENT — ENCOUNTER SYMPTOMS
FATIGUE: 1
DIZZINESS: 1
SHORTNESS OF BREATH: 1
ACTIVITY CHANGE: 1
PALPITATIONS: 0

## 2024-05-22 NOTE — PROGRESS NOTES
"Vancomycin Dosing by Pharmacy Consult Initiated    Arielle Felix is a 63 y.o. female who has been consulted for vancomycin dosing for pneumonia prescribed by Sheryl Li MD.    Reviewed relevant clinical data including weight, renal function, previous vancomycin doses, and vancomycin levels:  Creatinine   Date/Time Value Ref Range Status   05/21/2024 1335 1.0 0.6 - 1.2 mg/dL Final     No results found for: \"VANCORANDOM\", \"VANCOTROUGH\", \"VANCOPEAK\"      Vancomycin Administrations (last 96 hours)       None            Assessment/Plan  The patient is ordered vancomycin dosing by pharmacy.    The dose will be based on:         Wt Readings from Last 1 Encounters:   05/22/24 52.2 kg (115 lb 1.6 oz)         Estimated Creatinine Clearance: 47.5 mL/min by (C-G formula)      Will initiate a loading dose 1000 mg IV x1 and maintenance dose of 500 mg IV every 12 hours.    Target a trough of 15-20 ug/mL per vancomycin dosing per pharmacy order.  Pharmacy will continue to follow the patient's vancomycin dosing daily.      Please call vancomycin levels to the pharmacy.  La Nena Bhakta, PharmD    "

## 2024-05-22 NOTE — CONSULTS
"Brief Nutrition Note    Recommendations     Continue current diet  -and encourage PO intake >75% of each meal, as tolerated.   Ensure Plus TID for additional nourishment.     Clinical Course: Patient is a 63 y.o. female who was admitted on 5/21/2024 with a diagnosis of Palpitations [R00.2]  SVT (supraventricular tachycardia) (CMS/HCC) [I47.10]  Pericardial effusion [I31.39]  Weakness [R53.1]  Hemoptysis [R04.2]  Chest pain, unspecified type [R07.9].     Past Medical History:   Diagnosis Date    De Quervain's tenosynovitis     Essential (primary) hypertension     Follicular carcinoma of thyroid (CMS/HCC)     Gastro-esophageal reflux     Hand ulceration (CMS/HCC)     Hyperlipidemia, unspecified     Interstitial lung disease (CMS/HCC)     Leg ulcer (CMS/HCC)     Lung nodule     Lupus (CMS/HCC)     Osteomyelitis of hand (CMS/HCC)     Osteoporosis     Pulmonary hypertension (CMS/HCC)     Raynaud's disease     Severe    Reflux esophagitis     Scleroderma (CMS/HCC)     Screening mammogram for breast cancer 05/04/2021    BI-RADS category: 1 - Negative (care everywhere @ [facility])     Past Surgical History:   Procedure Laterality Date    CARDIAC CATHETERIZATION      COLONOSCOPY      HERNIA REPAIR         Reason for Assessment  Reason For Assessment: physician consult    MST Nutrition Screen Tool  Has patient lost weight without trying?: 0-->No  Has patient been eating poorly due to decreased appetite?: 0-->No  MST Nutrition Screen Score: 0     [id number] Physical Appearance  Last Bowel Movement: 05/22/24     Nutrition Order  Nutrition Order: meets nutritional requirements  Nutrition Order Comments: regular     Anthropometrics  Height: 167.6 cm (5' 6\")     Current Weight  Weight Method: Standing scale  Weight: 52.2 kg (115 lb 1.6 oz)     Ideal Body Weight (IBW)  Ideal Body Weight (IBW) (kg): 59.58  % Ideal Body Weight: 88.32     Body Mass Index (BMI)  BMI (Calculated): 18.6     Labs/Procedures/Meds  Lab Results Reviewed: reviewed, " pertinent   Lab Results   Component Value Date    GLUCOSE 129 (H) 05/22/2024    CALCIUM 8.8 05/22/2024     05/22/2024    K 4.6 05/22/2024    CO2 21 05/22/2024     05/22/2024    BUN 22 05/22/2024    CREATININE 1.0 05/22/2024       Medications  Pertinent Medications Reviewed: reviewed, pertinent    amLODIPine  10 mg oral q AM    atorvastatin  40 mg oral Daily (6p)    cefepime  2 g intravenous q12h INT    cholecalciferol (vitamin D3)  1,000 Units oral Daily    colchicine  0.3 mg oral Daily    cyanocobalamin  1,000 mcg oral Daily    enoxaparin  40 mg subcutaneous Daily (6p)    ipratropium-albuteroL  3 mL nebulization q6h SPENCER    levothyroxine  100 mcg oral Daily (6:30a)    macitentan  10 mg oral Daily    magnesium sulfate  2 g intravenous Once    methylPREDNISolone sodium succinate  40 mg intravenous q6h INT    pantoprazole  40 mg oral Daily    sildenafiL (pulm.hypertension)  20 mg oral TID    vancomycin  500 mg intravenous q12h INT       Estimated/Assessed Needs  Additional Documentation: Calorie Requirements (Group), Protein Requirements (Group)     Skin:intact    Clinical comments:  Pt seen for c/s.   Known to nutrition from previous admissions. Pt with CAD, chronic ulcers, ILD, lung mass, HTN, debility,   115# (5/22/24)   98# (3/15/24)   118# (4/17/24)   113# (4/19/23)   Pt now on steroids which she reports has helped her appetite and subsequent wt gain.   Pt receiving care from other providers during multiple visit attempts. Defer hx for follow up as IP status allows. Will order ONS TID for additional nourishment as pt has been amenable in the past.   Will continue to monitor and reassess per protocol unless otherwise consulted.     Goals:PO intake >75% of nutrient needs.  Monitor:Diet order, appetite, PO intake, labs, I/Os, skin integrity, wt status, GI function.     Recommendations: See above     Date: 05/22/24  Signature: BECCA Matthews

## 2024-05-22 NOTE — ASSESSMENT & PLAN NOTE
Ddx: autoimmune vs. Malignant versus other  Patient does have a history of scleroderma.  Pericardial effusion was seen on prior admissions.    With tachycardia and worsening shortness of breath.  POCUS performed by the emergency staff showed no evidence of tamponade physiology   S/p Methylprednisolone 125 mg in ED   CT shows mild pericardial effusion. When compared to previous CT PA dated April, pericaldial effusion looks slightly smaller on the study done in this visit.   TTE (5/23) shows small pericardial effusion with no evidence of hemodynamic compromise with normal respiratory variation and no chamber collapse. Tiny IVC with >50% respiratory variation consistent with low-normal right sided filling pressure.   Lab Results   Component Value Date     (H) 05/22/2024    HSTROPONINI 24.1 (H) 05/21/2024    HSTROPONINI 32.2 (H) 05/21/2024     QuantiFERON dated 2/28/2024 was negative    Plan:  -Continue colchicine at 0.3 mg once daily  -PRN furosemide 20 mg

## 2024-05-22 NOTE — ASSESSMENT & PLAN NOTE
Patient has multiple ulcers in her lower most notably on the second toe of right foot as well as the hallux of her left foot  There is also an ulcer on the medial aspect of her right ankle  None of these have any surrounding erythema, oozing or tenderness to palpation      Plan:  -Wound care  -f/u podiatry consult

## 2024-05-22 NOTE — HOSPITAL COURSE
Arielle is a 63 y.o. female admitted on 5/21/2024 with Palpitations [R00.2]  SVT (supraventricular tachycardia) (CMS/HCC) [I47.10]  Pericardial effusion [I31.39]  Weakness [R53.1]  Hemoptysis [R04.2]  Chest pain, unspecified type [R07.9]. Principal problem is Chest pain, unspecified type.    Past Medical History  Arielle has a past medical history of De Quervain's tenosynovitis, Essential (primary) hypertension, Follicular carcinoma of thyroid (CMS/HCC), Gastro-esophageal reflux, Hand ulceration (CMS/HCC), Hyperlipidemia, unspecified, Interstitial lung disease (CMS/HCC), Leg ulcer (CMS/HCC), Lung nodule, Lupus (CMS/HCC), Osteomyelitis of hand (CMS/HCC), Osteoporosis, Pulmonary hypertension (CMS/HCC), Raynaud's disease, Reflux esophagitis, Scleroderma (CMS/HCC), and Screening mammogram for breast cancer (05/04/2021).    History of Present Illness   DX admitted w/ tachycardia/ SOB/ fatigue/ cough/ dizziness/ chest pain> acute hypoxic respiratory failure    Chest CT with no PE    Recent dc April 2024 on supplemental 02

## 2024-05-22 NOTE — ASSESSMENT & PLAN NOTE
CT scan showed lung mass 16 mm in size in the left lower lobe in her admission in April.  This was also seen again in her CT pulmonary angiogram and this admission.    Patient does have a history of tobacco use disorder    Plan:  -Patient would benefit from outpatient PET/CT for further evaluation.  Patient is aware of same and will make arrangements for this upon her discharge

## 2024-05-22 NOTE — ASSESSMENT & PLAN NOTE
Known history of scleroderma with associated interstitial lung disease.  Known to Dr. Trevizo (rheumatology)  On home regimen tocilizumab with next dose scheduled for Thursday    Plan:  -Continue tocilizumab  -f/u CRP/ESR

## 2024-05-22 NOTE — CONSULTS
"Infectious Disease Consult Note    Patient Name: Arielle Felix  MR#: [id number]  : 1960  Admission Date: 2024  Consult Date: 24 1:49 PM   Consultant: Dusty Davey MD    Reason for Consult: worsening shortness of breath and tachycardia. Likely multifactorial including possible PNA. Also considering PJP in differential  Referring Provider: Kush Yanes MD      History of Present Illness     Arielle Felix is a 63 y.o. female past medical history of cutaneous systemic scleroderma (dx ), pulmonary hypertension, ILD, Raynaud's disease, PE (3/2023, did not receive AC due to epistaxis and hemoptysis), GERD and HTN with multiple recent admissions for progressive shortness of breath in setting of pericarditis c/b moderate to large pericardial effusion [s/p x 1 dose tocilizumab on , currently on prednisone taper] who was referred from OP rheumatology clinic for evaluation of tachycardia progressive shortness of breath.  ID consulted for \"worsening shortness of breath and tachycardia. Likely multifactorial including possible PNA. Also considering PJP in differential\".    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 Has been on long-term colchicine and prednisone [received three 2-week courses of prednisone 20 mg p.o. daily since early 2024, currently on maintenance dose at 10 mg p.o. daily].  During her last admission from  to , she received her first dose of tocilizumab on  per rheumatology recommendations.  Patient was discharged on  to continue long-term colchicine and prednisone taper.  No evidence of lobar consolidation on chest imaging during recurrent admissions.  On , she was seen for outpatient follow-up with rheumatology for consideration of ongoing tocilizumab " therapy, however, she was noted to be tachycardic with worsening shortness of breath for which she was referred to the ED for ongoing management.    On arrival to the ED, she was noted to be afebrile, hypertensive, tachycardic and hypoxemic requiring oxygen at 2 L/min via nasal cannula to maintain saturations over 95%.  Labs notable for WBC 11.76, hemoglobin 12.0, creatinine 1.0, BUN 20, potassium 5.3, sodium 138.  Chest x-ray:  1. Stable enlargement of the cardiac silhouette, in keeping with cardiomegaly   and pericardial effusion noted on recent CT scan.   2. Diffuse hazy airspace opacity and interstitial prominence, suspicious for   pulmonary edema. Other interstitial and airspace diseases would also have to be   considered in the appropriate clinical situation.   3. Questioned vague 1.3 cm left upper lobe nodular opacity.     CTPE:  1.  No evidence of acute pulmonary embolism.   2.  Multiple new perifissural left lower lobe nodules, suspicious for a   neoplastic process. PET/CT and or tissue sampling is recommended.   3.  Chronic interstitial lung disease in an NSIP pattern, with overall slightly   progressed appearance since March 2023. Pulmonary consultation is recommended.   4.  Stable cardiomegaly and large pericardial effusion.   5.  Mediastinal and supraclavicular adenopathy, similar to prior study, also   indeterminate, which could be further evaluated at time of PET/CT.     She received methylprednisone 40 mg IV and was started on empiric vancomycin and cefepime.    At the time of evaluation, patient remains afebrile, normotensive but tachycardic.  Reports progressive shortness of breath that never quite improved following her last admission, however, pleuritic chest pain has improved.  Has noted intermittent chills and night sweats at home, however, this has been ongoing for months without recent worsening. Denied associated fever, worsening cough from baseline or purulent sputum.  Has a longstanding  "history of intermittent hemoptysis which has not recently worsened.  No associated abdominal pain, nausea, vomiting, diarrhea, dysuria.  No known recent sick contacts or recent travel.    Outside records were reviewed.    Epidemiology:    Allergies:   Allergies   Allergen Reactions    Aspirin Shortness of breath     Other reaction(s): Unknown  \"stop breathing\"      Ibuprofen Shortness of breath     Stop breathing    Iodine Shortness of breath     Other reaction(s): Unknown    Iodine And Iodide Containing Products Shortness of breath     ? rash    Penicillins Shortness of breath     Other reaction(s): Unknown    Sulfa (Sulfonamide Antibiotics) Angioedema    Clindamycin     Hydrochlorothiazide      Other reaction(s): Abdominal Pain   Slow heart rate    Oxycodone      Other reaction(s): Vomiting    Sulfamethoxazole-Trimethoprim      Other reaction(s): Vomiting, Weakness     Latex Rash       Medical History:  has a past medical history of De Quervain's tenosynovitis, Essential (primary) hypertension, Follicular carcinoma of thyroid (CMS/HCC), Gastro-esophageal reflux, Hand ulceration (CMS/HCC), Hyperlipidemia, unspecified, Interstitial lung disease (CMS/HCC), Leg ulcer (CMS/HCC), Lung nodule, Lupus (CMS/HCC), Osteomyelitis of hand (CMS/HCC), Osteoporosis, Pulmonary hypertension (CMS/HCC), Raynaud's disease, Reflux esophagitis, Scleroderma (CMS/HCC), and Screening mammogram for breast cancer (2021).    Surgical History:   Past Surgical History:   Procedure Laterality Date    CARDIAC CATHETERIZATION      COLONOSCOPY      HERNIA REPAIR           Social History:   Social History     Tobacco Use    Smoking status: Former     Types: Cigarettes     Quit date: 9/15/2005     Years since quittin.6    Smokeless tobacco: Never    Tobacco comments:     Would smoke 1/2 of 1/2 PPD, quit in    Vaping Use    Vaping Use: Never used   Substance Use Topics    Alcohol use: Never    Drug use: Never            Family History: " "family history includes Breast cancer in her niece; Heart disease in her biological brother.    Review of Systems  Review of systems unremarkable except as noted in HPI    Medications:       Current Facility-Administered Medications   Medication Dose Route Frequency    amLODIPine  10 mg oral q AM    atorvastatin  40 mg oral Daily (6p)    benzonatate  200 mg oral 3x daily PRN    cefepime  2 g intravenous q12h INT    cholecalciferol (vitamin D3)  1,000 Units oral Daily    colchicine  0.3 mg oral Daily    cyanocobalamin  1,000 mcg oral Daily    glucose  16-32 g of dextrose oral PRN    Or    dextrose  15-30 g of dextrose oral PRN    Or    glucagon  1 mg intramuscular PRN    Or    dextrose 50 % in water (D50)  25 mL intravenous PRN    docusate sodium  100 mg oral Daily PRN    enoxaparin  40 mg subcutaneous Daily (6p)    ipratropium-albuteroL  3 mL nebulization q6h SPENCER    levothyroxine  100 mcg oral Daily (6:30a)    macitentan  10 mg oral Daily    meclizine  25 mg oral Daily PRN    [START ON 2024] methylPREDNISolone sodium succinate  50 mg intravenous Daily    pantoprazole  40 mg oral Daily    sildenafiL (pulm.hypertension)  20 mg oral TID           Objective     Vital Signs:    Visit Vitals  BP (!) 151/69 (BP Location: Left upper arm, Patient Position: Sitting)   Pulse (!) 118   Temp 36.4 °C (97.6 °F) (Oral)   Resp 18   Ht 1.676 m (5' 6\")   Wt 52.2 kg (115 lb 1.6 oz)   LMP  (LMP Unknown)   SpO2 95%   BMI 18.58 kg/m²     Temp (72hrs), Av.6 °C (97.9 °F), Min:36.4 °C (97.6 °F), Max:36.8 °C (98.2 °F)      Physical exam:   General: Chronically ill-appearing, cachectic.    HEENT: NC/AT/ anicteric sclera, pharynx clear.  Skin thickening  Neck: supple  Cardiovascular: regular S1/S2    Respiratory: Mildly dyspneic when speaking full sentences.  Globally decreased chest expansion, diffuse crackles   GI/Abdomen: soft, NT/ND,  no peritoneal signs  Extremities: no  edema.  Bilateral hands with multiple finger " autoamputations, significant sclerodactyly and joint deformity.  Multiple, small, dry superficial ulcerations involving toes and fingertips noted  MSK/JOINTS:  No swelling, erythema, or pain  Neurology and psych: no focal deficits, cooperative with exam  Skin: no rashes, lines clean dry intact        Lines, Drains, Airways, Wounds:  Peripheral IV (Adult) 05/21/24 Anterior;Left Hand (Active)   Number of days: 1       Labs:  CBC Results         05/22/24 05/21/24 04/18/24     0440 1335 0420    WBC 7.50 11.76 8.40    RBC 3.69 4.43 3.54    HGB 10.0 12.0 9.8    HCT 32.8 40.2 32.8    MCV 88.9 90.7 92.7    MCH 27.1 27.1 27.7    MCHC 30.5 29.9 29.9     289 254            CMP Results         05/22/24 05/21/24 04/18/24     0440 1335 0420     138 141    K 4.6 5.3 4.6    Cl 107 105 110    CO2 21 23 22    Glucose 129 135 96    BUN 22 20 29    Creatinine 1.0 1.0 0.9    Calcium 8.8 9.5 9.3    Anion Gap 12 10 9    EGFR >60.0 >60.0 >60.0           Comment for NA at 1335 on 05/21/24: Moderate hemolysis, results may be affected.     Comment for K at 1335 on 05/21/24: Results obtained on plasma. Plasma Potassium values may be up to 0.4 mEQ/L less than serum values. The differences may be greater for patients with high platelet or white cell counts.  Moderate hemolysis, results may be affected.     Comment for EGFR at 0440 on 05/22/24: Calculation based on the Chronic Kidney Disease Epidemiology Collaboration (CKD-EPI) equation refit without adjustment for race.    Comment for EGFR at 1335 on 05/21/24: Calculation based on the Chronic Kidney Disease Epidemiology Collaboration (CKD-EPI) equation refit without adjustment for race.    Comment for EGFR at 0420 on 04/18/24: Calculation based on the Chronic Kidney Disease Epidemiology Collaboration (CKD-EPI) equation refit without adjustment for race.            Microbiology Results       Procedure Component Value Units Date/Time    MRSA Screen, Nares Only Nose [[id number]]   (Normal) Collected: 05/22/24 0510    Specimen: Nasal Swab from Nose Updated: 05/22/24 0909     MRSA DNA, Nares Negative    Urine culture Urine, Clean Catch [[id number]]  (Abnormal) Collected: 05/21/24 1506    Specimen: Urine, Clean Catch Updated: 05/22/24 1303     Urine Culture **Positive Culture**      1,000-4,000 cfu/mL Gram Negative Rods    Narrative:      Please call Microbiology if clinical considerations warrant further testing. Isolate will be held for 7 days.          Microbiology Results (last 3 days)       Procedure Component Value - Date/Time    MRSA Screen, Nares Only Nose [[id number]]  (Normal) Collected: 05/22/24 0510    Lab Status: Final result Specimen: Nasal Swab from Nose Updated: 05/22/24 0909     MRSA DNA, Nares Negative     Comment: No methicillin resistant Staphylococcus aureus (MRSA) detected.       Urine culture Urine, Clean Catch [[id number]]  (Abnormal) Collected: 05/21/24 1506    Lab Status: Final result Specimen: Urine, Clean Catch Updated: 05/22/24 1303     Urine Culture **Positive Culture**      1,000-4,000 cfu/mL Gram Negative Rods    Narrative:      Please call Microbiology if clinical considerations warrant further testing. Isolate will be held for 7 days.               Imaging:  CT ANGIOGRAPHY CHEST PULMONARY EMBOLISM WITH IV CONTRAST [[id number]] Collected: 05/22/24 0805   Impression:     IMPRESSION:  1.  No evidence of acute pulmonary embolism.  2.  Multiple new perifissural left lower lobe nodules, suspicious for a  neoplastic process. PET/CT and or tissue sampling is recommended.  3.  Chronic interstitial lung disease in an NSIP pattern, with overall slightly  progressed appearance since March 2023. Pulmonary consultation is recommended.  4.  Stable cardiomegaly and large pericardial effusion.  5.  Mediastinal and supraclavicular adenopathy, similar to prior study, also  indeterminate, which could be further evaluated at time of PET/CT.    A preliminary report these findings  "provided by Agatha Ramos DO, May 21,  2024 10:45 PM    In accordance with [place] Act 112,  the patient will receive a letter notifying them  to follow up with their physician.   X-RAY CHEST 1 VIEW [[id number]] Collected: 05/21/24 2132   Impression:     IMPRESSION:  1. Stable enlargement of the cardiac silhouette, in keeping with cardiomegaly  and pericardial effusion noted on recent CT scan.  2. Diffuse hazy airspace opacity and interstitial prominence, suspicious for  pulmonary edema. Other interstitial and airspace diseases would also have to be  considered in the appropriate clinical situation.  3. Questioned vague 1.3 cm left upper lobe nodular opacity.         Assessment   Arielle Felix is a 63 y.o. female past medical history of cutaneous systemic scleroderma (dx 1998), pulmonary hypertension, ILD, Raynaud's disease, PE (3/2023, did not receive AC due to epistaxis and hemoptysis), GERD and HTN with multiple recent admissions for progressive shortness of breath in setting of pericarditis c/b moderate to large pericardial effusion [s/p x 1 dose tocilizumab on 4/18, currently on prednisone taper] who was referred from OP rheumatology clinic for evaluation of tachycardia progressive shortness of breath.  ID consulted for \"worsening shortness of breath and tachycardia. Likely multifactorial including possible PNA. Also considering PJP in differential\".    Progressive shortness of breath-suspect her presentation is in keeping with progression of underlying pulmonary hypertension and ILD.  Given no associated fever, purulent sputum and no noted consolidation on imaging, low suspicion for bacterial pneumonia.  Patient has received multiple short courses of prednisone 20 mg p.o. daily, however, would expect risk of PJP or other opportunistic infections to increase with prolonged use of high-dose steroids, typically the equivalent of over 20 mg prednisone daily for over 1 month.              Plan   Recommend " discontinuing antibiotics and close monitoring  Will follow blood and sputum cultures.  Please send sputum for PJP PCR, fungal staining and fungal culture  Recommend RPP  Appreciate pulmonology and rheumatology input  ID will follow    Final recommendations pending attending attestation

## 2024-05-22 NOTE — PROGRESS NOTES
Met w/pt for sc support in response to a consult. Alert, and a wake in bed. Shared about the challenges of her illness and hospitalization. Mentioned feeling a little overwhelmed she's been hospitalized twice the last two month but continue to hold on hope. Pt is well supported by family and depend on prayer as her coping mechanism.  introduced self, role and services.  offered empathetic reflection, compassionate presence, active listening,and prayer as requested. Pt was able to ventilate her thoughts/feeligs to this . Sc will continue to follow for spiritual, and emotional support.- Rev.Dr.Peter Luna, Spiritual Care Specialist. [phone]

## 2024-05-22 NOTE — PROGRESS NOTES
"   Occupational Therapy -  Initial Evaluation     Patient: Arielle GUAJARDO Isidro  Location: [facility] [address]  MRN: [id number]  Today's date: 5/22/2024  THERAPIST: Liz Summers OTR/L/  Hanh Summers OTR/L    HISTORY OF PRESENT ILLNESS     Arielle is a 63 y.o. female admitted on 5/21/2024 with Palpitations [R00.2]  SVT (supraventricular tachycardia) (CMS/HCC) [I47.10]  Pericardial effusion [I31.39]  Weakness [R53.1]  Hemoptysis [R04.2]  Chest pain, unspecified type [R07.9]. Principal problem is Chest pain, unspecified type.    Past Medical History  Arielle has a past medical history of De Quervain's tenosynovitis, Essential (primary) hypertension, Follicular carcinoma of thyroid (CMS/HCC), Gastro-esophageal reflux, Hand ulceration (CMS/HCC), Hyperlipidemia, unspecified, Interstitial lung disease (CMS/HCC), Leg ulcer (CMS/HCC), Lung nodule, Lupus (CMS/HCC), Osteomyelitis of hand (CMS/HCC), Osteoporosis, Pulmonary hypertension (CMS/HCC), Raynaud's disease, Reflux esophagitis, Scleroderma (CMS/HCC), and Screening mammogram for breast cancer (05/04/2021).    History of Present Illness   DX admitted w/ tachycardia/ SOB/ fatigue/ cough/ dizziness/ chest pain> acute hypoxic respiratory failure  PRIOR LEVEL OF FUNCTION AND LIVING ENVIRONMENT     Prior Level of Function      Flowsheet Row Most Recent Value   Dominant Hand right   Ambulation assistive equipment   Transferring assistive equipment   Toileting independent   Bathing assistive equipment and person   Dressing independent   Eating independent   IADLs assistive person   Driving/Transportation friends/family   Assistive Device Currently Used at Home PTA, reports A as needed for ADls/ A all IADLs, amb w/ no AD w/ + occ furniture support vs use of RW ( which she sttes doesnt work for her because it is too tall\") Pt states she needs a rollator   Assistive Device/Animal Currently Used at Home walker, standard, walker, front-wheeled, scale, oxygen "          Prior Living Environment      Flowsheet Row Most Recent Value   People in Home other (see comments)   Current Living Arrangements home   Home Accessibility stairs to enter home (Group), stairs within home (Group)   Living Environment Comment 2SH, 5 LARRY/ R or L rail avail, has BSC avail 1st flr but prefers using stairglide to get access to 2nd flr bed/ bath, tub combo equipped w/ grab bar and shower chr,  endorses has been A w/ spongebathing          Occupational Profile      Flowsheet Row Most Recent Value   Successful Occupations retired: accounting          VITALS AND PAIN     OT Vitals      Date/Time Pulse HR Source Resp SpO2 Pt Activity O2 Therapy O2 Del Method O2 Flow Rate BP BP Location BP Method Pt Position [facility]   05/22/24 1129 115 Monitor -- 99 % At rest Supplemental oxygen Nasal cannula 2 L/min 151/69 Left upper arm Automatic Sitting [facility]   05/22/24 1139 115 Apical 18 96 % At rest Supplemental oxygen Nasal cannula 2 L/min 151/69 Left upper arm Automatic Sitting [facility]   05/22/24 1145 128 Monitor -- 85 % Walking None (Room air) -- -- -- -- -- -- [facility]   05/22/24 1152 118 Monitor -- 95 % At rest Supplemental oxygen Nasal cannula 2 L/min -- no read/ cuff too tight and pt removed it -- -- -- GDS          OT Pain      Date/Time Rating: Rest Rating: Activity Interventions [facility]   05/22/24 1129 0 - no pain 0 - no pain care clustered GDS             Objective   OBJECTIVE     Start time:  1128  End time:  1152  Session Length: 24 min  Mode of Treatment: occupational therapy, individual therapy    General Observations  Patient received upright, in chair. She was agreeable to therapy, no issues or concerns identified by nurse prior to session.      Precautions: fall, oxygen therapy device and L/min        Services  Do You Speak a Language Other Than English at Home?: no      OT Eval and Treat - 05/22/24 1206          Cognition    Orientation Status oriented x 4     Affect/Mental Status WNL     Follows  "Commands WNL     Cognitive Function WNL     Comment, Cognition awake, alert, oriented, follows commands, able to indicate her needs/ actively participated in session        Vision Assessment/Intervention    Vision Assessment corrective lenses for reading        Hearing Assessment    Hearing Status WNL        Sensory Assessment    Sensory Assessment sensation intact, upper extremities   grossly intact to LT       Upper Extremity Assessment    UE Assessment ROM and Strength WFL except     General Observations demos full AROM B sh/elb; B hands have schlerotic digit changes w/ varied malformations at MP/ PIPs> 4th digit B hands w/ trigger contractures, index on L w/ PIP/ DIP amp; + alvarez and web wasting noted . Functionally, demonstrates use of elb/ forearms for push off armrests of low chr surface/ uses heel of hands and use of  modified lateral pinch or Bilateral heels of hands for grasp tasks        Upper Extremity Strength    Shoulder, Left (Strength) at least 4-     Elbow, Left (Strength) at least 4-     Shoulder, Right (Strength) at least 4-     Elbow, Right (Strength) at least 4-        Bed Mobility    Comment received pt OOB in chr        Mobility Belt    Mobility Belt Used for All Out of Bed Activity yes        Sit/Stand Transfer    Surface chair with arm rests     [place] minimum assist (75% or more patient effort);1 person assist     Safety/Cues increased time to complete;verbal cues;tactile cues;preparatory posture;technique     Assistive Device none     Transfer Comments transitions to stand w/ use of B forearms for push off/ effortful/ low surface> min to complete. In static stance, appears stable. Amb <> BR w/ CS/ no AD/ + occ reach for brief external support. Reports amb at home w/o AD because her \"RW is too high\". Discussed that the height can be adjusted pt adamently states \"it doesnt work for her and that she needs a rollator because she gets out of breath\". Relayed info to PT. + desat on RA/ " recovered well w/ 2L replaced        Toilet Transfer    Transfer Technique sit/stand     [place] close supervision     Safety/Cues verbal cues;preparatory posture;proper use of assistive device;technique     Assistive Device grab bars/safety frame     Comment pt utilized R grab bar to lower onto toilet w/ CS/cues; arose w/ CS/cues w/ heavy reliance on grab bar. pt reports she doesnt have this difficulty at home because her toilet is handicapped height        Functional Mobility    Distance few steps at bedside;in room/bathroom     Functional Mobility [place] close supervision     Assistive Device none     Functional Mobility Comments amb <> BR w/ CS/ no AD/ no LOB; + intermittent brief reach for external support; + light desat/ returned to 2L        Bathing    Comment not observed/ A via nursing        Upper Body Dressing    Comment A to adjust tie of gown        Lower Body Dressing    Tasks doff;don;socks     [place] supervision     Safety/Cues increased time to complete;verbal cues;compensatory techniques     Position supported sitting     Comment doffed/ donned socks seated using legs cross tech; performed w/ SUP/ light effort due to digit        Grooming    Tasks washes, rinses and dries face;washes, rinses and dries hands     [place] modified independence;set up        Toileting    Tasks adjust/manage clothing;perform bladder hygiene     [place] close supervision;modified independence     Comment CS for CM/ mod ind hyg/seated        Self-Feeding    [place] modified independence        Balance    Static Sitting Balance WNL;supported;sitting in chair     Dynamic Sitting Balance WNL;supported;sitting in chair     Sit to Stand Dynamic Balance mild impairment     Static Standing Balance mild impairment;supported;standing     Dynamic Standing Balance mild impairment;supported;standing     Comment, Balance stable seated in chr; standing static stable; amb w/ CS <> BR/ no AD; + reach for  brief intermittent extenrnal suppport        Impairments/Safety Issues    Impairments Affecting Function endurance/activity tolerance;strength;shortness of breath     Functional Endurance demos desat on RA/ decreased act lalitha/ end; at light decline from baseline                                    Education Documentation  Treatment Plan, taught by Liz Summers OT at 5/22/2024  1:19 PM.  Learner: Patient  Readiness: Acceptance  Method: Explanation, Demonstration  Response: Verbalizes Understanding, Demonstrated Understanding, Needs Reinforcement  Comment: educ role of therapy safe transfer/ ADL tech        Session Outcome  Patient upright, in chair at end of session, chair alarm on, all needs met, call light in reach, personal items in reach. Nursing notified about patient's performance, patient's position, and patient's response to therapy/activity.    AM-PAC™ - ADL (Current Function)     Putting on/taking off regular lower body clothing 4 - None   Bathing 3 - A Little   Toileting 3 - A Little   Putting on/taking off regular upper body clothing 3 - A Little   Help for taking care of personal grooming 4 - None   Eating meals 4 - None   AM-PAC™ ADL Score 21      ASSESSMENT AND PLAN     Progress Summary  OT ev completed/ transitions to stand w/ forearm push off armrests/ + effort/ min A x1; amb w/ CS/ no AD:/ + occ brief external support. ADL SUP/ mod ind> min. At light decline from baseline. REC home w/ A/ HC PT    Patient/Family Therapy Goal Statement: get a rollator    OT Plan      Flowsheet Row Most Recent Value   Rehab Potential good, to achieve stated therapy goals at 05/22/2024 1206   Therapy Frequency 4 times/wk at 05/22/2024 1206   Planned Therapy Interventions activity tolerance training, occupation/activity based interventions, patient/caregiver education/training, strengthening exercise, functional balance retraining, BADL retraining, transfer/mobility retraining at 05/22/2024 1206            OT Discharge  Recommendations      Flowsheet Row Most Recent Value   OT Recommended Discharge Disposition home with assistance, home with home health at 05/22/2024 1206   Anticipated Equipment Needs if Discharged Home (OT) none at 05/22/2024 1206                 OT Goals      Flowsheet Row Most Recent Value   Bed Mobility Goal 1    Activity/Assistive Device bed mobility activities, all at 05/22/2024 1206   [place] modified independence at 05/22/2024 1206   Time Frame by discharge at 05/22/2024 1206   Transfer Goal 1    Activity/Assistive Device all transfers at 05/22/2024 1206   [place] modified independence at 05/22/2024 1206   Time Frame by discharge at 05/22/2024 1206   Bathing Goal 1    Activity/Assistive Device bathing skills, all at 05/22/2024 1206   [place] minimum assist (75% or more patient effort) at 05/22/2024 1206   Time Frame by discharge at 05/22/2024 1206   Dressing Goal 1    Activity/Adaptive Equipment upper body dressing at 05/22/2024 1206   [place] supervision required, modified independence, set-up required at 05/22/2024 1206   Time Frame by discharge at 05/22/2024 1206   Dressing Goal 2    Activity/Adaptive Equipment lower body dressing at 05/22/2024 1206   [place] modified independence at 05/22/2024 1206   Time Frame by discharge at 05/22/2024 1206   Toileting Goal 1    Activity/Assistive Device toileting skills, all at 05/22/2024 1206   [place] modified independence at 05/22/2024 1206   Time Frame by discharge at 05/22/2024 1206

## 2024-05-22 NOTE — PROGRESS NOTES
Vancomycin Dosing by Pharmacy Consult Discontinued    IV Vancomycin Dosing by Pharmacy Protocol was discontinued.  Pharmacy will sign off and no longer dose vancomycin for this patient.    Nando Renteria, PharmD

## 2024-05-22 NOTE — PLAN OF CARE
Plan of Care Review  Plan of Care Reviewed With: patient  Progress: no change  Outcome Evaluation: Pt AAOx4, calm and cooperative w/ care. VSS. Meds given as prescribed. Call bell within reach, pt sleeping in between care. Bed alarm on. pt standby asst to bathroom.

## 2024-05-22 NOTE — PLAN OF CARE
Problem: Adult Inpatient Plan of Care  Goal: Plan of Care Review  Flowsheets (Taken 5/22/2024 6192)  Progress: no change   See RD note for recs

## 2024-05-22 NOTE — DISCHARGE INSTRUCTIONS
Dear  MsGia Isidro,     On 5/22/24 you were admitted to [facility] for after having worsening shortness of breath and a fast heart rate during your appointment with Dr. Trevizo. Our workup included imaging of your lungs, ultrasound of your heart (echocardiogram), blood cultures, sputum culture, and evaluation by specialists including cardiology, pulmonology, rheumatology, and infectious disease. Your infectious work up was negative and you do not need antibiotics.     The echocardiogram of your heart showed that you still have a small amount of fluid around the heart called a pericardial effusion, however this was smaller than previously seen. The CT scan of your chest showed fluid overload in your lung and a possible flare of your interstitial  lung disease. We treated you with IV Lasix (water pill) and steroids and your symptoms improved. We discussed continuing to use Lasix as needed for symptoms of weight gain or shortness of breath, please see instructions below. We have continued you on oral steroids (Prednisone), please see instructions below. You have follow up with Dr. Wasserman on 6/4 who will decide on your need for steroids at that time.     Additionally, we were able to administer the Tocilizumab infusion during your hospital course ordered by Dr. Trevizo.     The CT of your chest showed multiple lung nodules, with an increase in size in one of the modules from previous imaging. You will need to have a PET scan ordered by Dr. Wasserman for further evaluation.     You were evaluated by podiatry for your foot wounds. We have provided a referral to the wound care center with Dr. Mensah for follow up.     Review the 'After Visit Summary' for the most up to date list of your medications.    You were discharged on 05/24/24.    Medications    Take Prednisone 40 mg daily for two more days (last day 5/26). Then take Prednisone 30 mg daily for three days. Then take Prednisone 20 mg daily for three  days. Then take Prednisone 10 mg daily for 3 days (last day 6/4). You will have follow up with Dr. Wasserman to discuss further need for steroids at that time.     Take Lasix 20 mg as needed for rapid weight gain or shortness of breath. Weight yourself every morning. If you gain 3lbs in one day or 5 lbs in one week, this is a sign of fluid retention and you should take one tablet of Lasix and call Dr. Arvizu's office for further instructions.     Things that require your attention for follow up:     -  your medications from your pharmacy  - Take all your medications every day, even if you start to feel better  - Schedule an appointment with PCP as soon as possible (1-2 weeks) so they can review all laboratory, radiology and other testing.  - Follow up with Dr. Wasserman  - Follow up with Dr. Arvizu  - Follow up with Dr. Trevizo  - Follow up with Dr. Mensah at the wound care center at [facility]      Please review all discharge instructions as discussed. Should your symptoms worsen or not improve, call your doctor or return to emergency room immediately.

## 2024-05-22 NOTE — CONSULTS
"Palliative Care Consult      This is Hospital Day: 2    Assessment/Plan  Palliative care by specialist  Assessment & Plan  Ms. Felix is a 63 year old woman with PMH scleroderma with ILD, pulmonary HTN who presented with tachycardia and dyspnea.    -code status: remains full code  -goals: life prolonging, she wants to continue all treatments to try to live longer and get better, but feels strongly about not wanting to be kept alive if she is to be bedbound and unable to be at least somewhat functional. I discussed with her my concern that if she ended up intubated this would be the situation she would be in, she will consider it but at this time wants to \"at least try\" all life prolonging measures.  -she does not have ACP - she requested I bring living will paperwork for her  -2 adult children would share decision making in the absence of HCPOA documentation  -has significant home support but would benefit from palliative bridge and/or palliative NP support - did not discuss with patient today   -will continue to follow     Debility  Assessment & Plan  -at baseline lives at home with family and/or aides 24 hours a day   -here with tachycardia and dyspnea   -high risk for further decline in the setting of frailty and medical conditions     Shortness of breath  Assessment & Plan  -in the setting if ILD and pulmonary HTN 2/2 scleroderma.   -she has tried morphine in the past which helps her breathe but makes her nauseous. She does not want to take any opioid medications unless things were really dire - I let her know we could have them available as needed but she did not need to take them unless she really needed them. Can order SL morphine 3 mg q4hr PRN dyspnea   -she requests Tessalon pearls to help with cough          Reason for Consult:  Assistance with clarification of goals of care    HPI      Source: chart review and the patient    Arielle Felix is an 63 y.o. female PMH scleroderma with associated ILD " and pulmonary HTN who presented with tachycardia and dyspnea.     On my visit, she is awake and alert. She is tachypneic, endorses shortness of breath and cough. See above for GOC conversations.       Chart Review:  The following portions of the patient’s history were reviewed and updated as appropriate:    Past Medical History  Past Medical History:   Diagnosis Date    De Quervain's tenosynovitis     Essential (primary) hypertension     Follicular carcinoma of thyroid (CMS/HCC)     Gastro-esophageal reflux     Hand ulceration (CMS/HCC)     Hyperlipidemia, unspecified     Interstitial lung disease (CMS/HCC)     Leg ulcer (CMS/HCC)     Lung nodule     Lupus (CMS/HCC)     Osteomyelitis of hand (CMS/HCC)     Osteoporosis     Pulmonary hypertension (CMS/HCC)     Raynaud's disease     Severe    Reflux esophagitis     Scleroderma (CMS/HCC)     Screening mammogram for breast cancer 05/04/2021    BI-RADS category: 1 - Negative (care everywhere @ [facility])       Past Surgical History  Past Surgical History:   Procedure Laterality Date    CARDIAC CATHETERIZATION      COLONOSCOPY      HERNIA REPAIR         Yarsanism:  Oriental orthodox  Nondenominational / Spiritual:    will ask spiritual care to follow    Review of Systems:  All other systems reviewed and negative except as noted in the HPI.    [place], [place]  [employer] (Outside records) query reviewed with no concerns.    Current Medications:    amLODIPine, 10 mg, oral, q AM    atorvastatin, 40 mg, oral, Daily (6p)    benzonatate, 200 mg, oral, 3x daily PRN    cefepime, 2 g, intravenous, q12h INT **AND** Inpatient consult to Infectious Diseases, , , Once    cholecalciferol (vitamin D3), 1,000 Units, oral, Daily    colchicine, 0.3 mg, oral, Daily    cyanocobalamin, 1,000 mcg, oral, Daily    glucose, 16-32 g of dextrose, oral, PRN **OR** dextrose, 15-30 g of dextrose, oral, PRN **OR** glucagon, 1 mg, intramuscular, PRN **OR** dextrose 50 % in water (D50), 25 mL, intravenous, PRN     "docusate sodium, 100 mg, oral, Daily PRN    enoxaparin, 40 mg, subcutaneous, Daily (6p)    ipratropium-albuteroL, 3 mL, nebulization, q6h SPENCER    levothyroxine, 100 mcg, oral, Daily (6:30a)    macitentan, 10 mg, oral, Daily    meclizine, 25 mg, oral, Daily PRN    pantoprazole, 40 mg, oral, Daily    [START ON 2024] predniSONE, 40 mg, oral, Daily    sildenafiL (pulm.hypertension), 20 mg, oral, TID    vancomycin, 500 mg, intravenous, q12h INT **AND** [] IV Vancomycin Therapy by Pharmacy Protocol, , , Once    Allergies:  Allergies   Allergen Reactions    Aspirin Shortness of breath     Other reaction(s): Unknown  \"stop breathing\"      Ibuprofen Shortness of breath     Stop breathing    Iodine Shortness of breath     Other reaction(s): Unknown    Iodine And Iodide Containing Products Shortness of breath     ? rash    Penicillins Shortness of breath     Other reaction(s): Unknown    Sulfa (Sulfonamide Antibiotics) Angioedema    Clindamycin     Hydrochlorothiazide      Other reaction(s): Abdominal Pain   Slow heart rate    Oxycodone      Other reaction(s): Vomiting    Sulfamethoxazole-Trimethoprim      Other reaction(s): Vomiting, Weakness     Latex Rash         Objective    Physical Exam:   Physical Exam  Vitals reviewed.   Constitutional:       General: She is not in acute distress.     Appearance: She is ill-appearing.   HENT:      Head: Normocephalic.      Mouth/Throat:      Mouth: Mucous membranes are dry.   Eyes:      General: No scleral icterus.  Cardiovascular:      Rate and Rhythm: Tachycardia present.   Pulmonary:      Comments: Tachypneic with increased respiratory effort   Musculoskeletal:      Comments: Multiple finger amputations at various levels; wounds as pictured in media tab    Skin:     Comments: Skin tight and consistent with scleroderma    Neurological:      General: No focal deficit present.      Mental Status: She is alert and oriented to person, place, and time.   Psychiatric:         " Mood and Affect: Mood normal.         Behavior: Behavior normal.         Vitals:  Vitals:    05/22/24 1148   BP:    Pulse:    Resp:    Temp: 36.4 °C (97.6 °F)   SpO2:        Laboratory Studies:  CBC Results         05/22/24 05/21/24 04/18/24     0440 1335 0420    WBC 7.50 11.76 8.40    RBC 3.69 4.43 3.54    HGB 10.0 12.0 9.8    HCT 32.8 40.2 32.8    MCV 88.9 90.7 92.7    MCH 27.1 27.1 27.7    MCHC 30.5 29.9 29.9     289 254          CMP Results         05/22/24 05/21/24 04/18/24     0440 1335 0420     138 141    K 4.6 5.3 4.6    Cl 107 105 110    CO2 21 23 22    Glucose 129 135 96    BUN 22 20 29    Creatinine 1.0 1.0 0.9    Calcium 8.8 9.5 9.3    Anion Gap 12 10 9    EGFR >60.0 >60.0 >60.0           Comment for NA at 1335 on 05/21/24: Moderate hemolysis, results may be affected.     Comment for K at 1335 on 05/21/24: Results obtained on plasma. Plasma Potassium values may be up to 0.4 mEQ/L less than serum values. The differences may be greater for patients with high platelet or white cell counts.  Moderate hemolysis, results may be affected.     Comment for EGFR at 0440 on 05/22/24: Calculation based on the Chronic Kidney Disease Epidemiology Collaboration (CKD-EPI) equation refit without adjustment for race.    Comment for EGFR at 1335 on 05/21/24: Calculation based on the Chronic Kidney Disease Epidemiology Collaboration (CKD-EPI) equation refit without adjustment for race.    Comment for EGFR at 0420 on 04/18/24: Calculation based on the Chronic Kidney Disease Epidemiology Collaboration (CKD-EPI) equation refit without adjustment for race.          Troponin I Results         05/21/24 05/21/24 04/16/24     1506 1335 1802    HS Troponin I 24.1 32.2 30.5          ABG Results         04/13/23     1949    Source Of Oxygen nc            I have reviewed the patient's pertinent labs to the time of note.  Significant abnormals are anemia, elevated BNP.    Imaging and Other Studies:     CT ANGIOGRAPHY CHEST  PULMONARY EMBOLISM WITH IV CONTRAST    Result Date: 5/22/2024  IMPRESSION: 1.  No evidence of acute pulmonary embolism. 2.  Multiple new perifissural left lower lobe nodules, suspicious for a neoplastic process. PET/CT and or tissue sampling is recommended. 3.  Chronic interstitial lung disease in an NSIP pattern, with overall slightly progressed appearance since March 2023. Pulmonary consultation is recommended. 4.  Stable cardiomegaly and large pericardial effusion. 5.  Mediastinal and supraclavicular adenopathy, similar to prior study, also indeterminate, which could be further evaluated at time of PET/CT. A preliminary report these findings provided by Agatha Ramos DO, May 21, 2024 10:45 PM In accordance with [place] Act 112,  the patient will receive a letter notifying them to follow up with their physician.    X-RAY CHEST 1 VIEW    Result Date: 5/21/2024  IMPRESSION: 1. Stable enlargement of the cardiac silhouette, in keeping with cardiomegaly and pericardial effusion noted on recent CT scan. 2. Diffuse hazy airspace opacity and interstitial prominence, suspicious for pulmonary edema. Other interstitial and airspace diseases would also have to be considered in the appropriate clinical situation. 3. Questioned vague 1.3 cm left upper lobe nodular opacity.                                                                  Cardiac Imaging    TRANSTHORACIC ECHO (TTE) COMPLETE 04/10/2024    Interpretation Summary  1. Normal left ventricular cavity size with mild concentric left ventricular hypertrophy. Normal systolic function. EF: 60%. No regional wall motion abnormalities. Cannot determine diastolic function. Global longitudinal strain not reported due to technical limitations of study.  2. Aortic valve has three cusps. Trace aortic regurgitation. Aortic valve and root sclerosis.  3. Thickened mitral valve leaflets. Trace mitral valve regurgitation. Normal left atrial size.  4. Normal right ventricular structure  and function. Trace tricuspid valve regurgitation with estimated RVSP: 51 mmHg (assuming right atrial pressure of 3 mmHg). Normal right atrial size. Pulmonic valve structurally normal. Trace pulmonic valve regurgitation. Pulsed wave Doppler of the RVOT systolic profile shows decreased acceleration times and geometry consistent with mildly elevated PVR.  5. IVC size is normal with > 50% inspiratory collapse consistent with normal right atrial pressure. Small-to-moderate sized, circumferential pericardial effusion. No echocardiographic evidence for cardiac tamponade.  6. Compared to previous study from 3/4/2024, no significant change. Pericardial effusion size is similar, estimated right atrial pressure lower.       I have independently reviewed the pertinent imaging to the time of note and agree with reported results. Significant findings include: chronic ILD and multiple nodules c/f malignancy. No PE     I have independently reviewed the pertinent cardiac studies to the time of note and agree with reported results. Significant findings include: Qtc 445         Becca Reyes MD  Office [phone]

## 2024-05-22 NOTE — ASSESSMENT & PLAN NOTE
Patient is very cachectic on clinical examination  Has 24 hours/day supportive care at home.  Is able to walk following with difficulty.  Uses assisting device for ambulation.  Wt Readings from Last 5 Encounters:   05/21/24 52.6 kg (116 lb)   04/17/24 53.5 kg (118 lb)   04/10/24 44.5 kg (98 lb)   03/16/24 44.6 kg (98 lb 5.2 oz)   02/01/24 47.6 kg (105 lb)     Weight was down to approximately 90 pounds a few months ago however was recently started on steroids and stated that her appetite has since increased.  Currently weighs 116 pounds however I believe that this may also be attributed at least in part to some    Impression: Likely severe debility secondary to chronic underlying disease affecting the patient's breathing and functional status overall.  Possibly underlying malignancy in the context of the patient's history of tobacco use disorder as well as lung nodule seen on CT scan.    Plan:  -Dietitian  -PT/OT   -f/u outpatient PET/CT to further characterize lung nodule  -palliative c/s

## 2024-05-22 NOTE — ASSESSMENT & PLAN NOTE
Presented with progressively worsening shortness of breath over the last 3 days.  Patient was recently discharged from [facility] on home oxygen.  Currently on 2 L in the emergency department and is saturating appropriately.  Known history of pericardial effusion last seen on transthoracic echo dated 4/10/2024.  POCUS in the emergency department showed evidence of pericardial effusion with no tamponade physiology  Patient also has a history of interstitial lung disease in the context of scleroderma  CT pulm angiogram shows no evidence of pulm embolism however there is evidence of bilateral changes concerning for volume overload  Patient did have rigors but no fevers.  Also has a cough that is productive of some sputum that is on occasion blood-tinged.    impression: Shortness of breath is most likely multifactorial including interstitial lung disease flare, pericardial effusion (which looks smaller on the study dated 5/21/2024), possibly some pulmonary edema secondary to volume overload in the context of pulmonary hypertension/hypertensive emergency as her BP was raised at 211/94 upon arrival.  Her cough may also be suggestive of a viral/bacterial etiology however this is less likely per the clinical picture and imaging. Patient is also at risk for PJP in light of immunocompromised state zoila with recent steroid course. Dyspnea related to tocilizumab is rare and unlikely in this case as the last dose was several weeks ago.     Plan:  -Continue home regimen macitentan and sildenafil  -IV methylprednisolone 40 mg daily (decrease by 10 mg every 3 days until down to 10 mg daily)  -PRN furosemide 20 mg  -Discontinued antibiotics  -f/u sputum for PJP PCR, fungal staining/culture, RPP  -determine if candidate for PJP prophylaxis, per ID

## 2024-05-22 NOTE — ASSESSMENT & PLAN NOTE
Patient has a history of interstitial lung disease in the context of scleroderma  Recently started on tocilizumab with Dr. Trevizo her rheumatologist  Patient stated that her next dose will be due on Thursday, 5/23/2024    Impression: It is unclear if the patient is currently experiencing shortness of breath secondary to a flare of her interstitial lung disease.  Shortness of breath is likely multifactorial including interstitial lung disease, pericardial effusion, and pulmonary edema (volume overload)    Plan:  -Continue tocilizumab   -IV methylprednisolone 40 mg daily (decrease by 10 mg every 3 days until down to 10 mg daily)  -outpatient follow up with Dr. Martinez and Dr. Wasserman

## 2024-05-22 NOTE — ASSESSMENT & PLAN NOTE
Patient has evidence of coronary artery calcification on CT pulm angiogram  Patient was suggested to start on Plavix however was not agreeable to same  Otherwise, patient is on statin therapy    Plan:  -Continue home regimen atorvastatin 40 mg once daily

## 2024-05-22 NOTE — PROGRESS NOTES
Internal Medicine  Daily Progress Note       SUBJECTIVE   This is a 63 y.o. year-old female admitted on 2024 with Palpitations [R00.2]  SVT (supraventricular tachycardia) (CMS/HCC) [I47.10]  Pericardial effusion [I31.39]  Weakness [R53.1]  Hemoptysis [R04.2]  Chest pain, unspecified type [R07.9].    Interval History: No acute overnight events. Patient was seen and examined by team this morning. She states that her symptoms have improved. She no longer feels short of breath and she is coughing less frequently. She endorsed having a mild headache in the occipital area of her head. She denies fever/chills, chest pain, abdominal pain, nausea and vomiting.     OBJECTIVE      Vital signs in last 24 hours:  Temp:  [36.4 °C (97.6 °F)-36.8 °C (98.2 °F)] 36.7 °C (98 °F)  Heart Rate:  [] 105  Resp:  [15-32] 18  BP: (132-172)/(60-92) 132/63  Temp (24hrs), Av.6 °C (97.9 °F), Min:36.4 °C (97.6 °F), Max:36.8 °C (98.2 °F)    Intake/Output       Intake Night 24 - 24 06 Day 24 - 24 1459 Evening 24 1500 - 24 Output Night 24 - 24 0659 Day 24 - 24 145 Evening 24 - 24    IV Piggyback 100 50 --                    Total 100 50 -- Total -- -- --   Last 3 shifts --  Intake: 150       Output: 0       Net: 150         PHYSICAL EXAMINATION      Physical Exam  Constitutional:       General: She is not in acute distress.     Appearance: She is ill-appearing. She is not toxic-appearing.   HENT:      Head: Atraumatic.   Eyes:      General: No scleral icterus.     Conjunctiva/sclera: Conjunctivae normal.   Cardiovascular:      Rate and Rhythm: Normal rate and regular rhythm.      Pulses: Normal pulses.      Heart sounds: Normal heart sounds.   Pulmonary:      Effort: Pulmonary effort is normal.      Breath sounds: Rales present.      Comments: Bilateral inspiratory rales in mid-lung zones  Abdominal:      General: Abdomen  is flat. Bowel sounds are normal. There is no distension.      Palpations: Abdomen is soft.      Tenderness: There is no abdominal tenderness. There is no guarding.   Musculoskeletal:      Cervical back: Normal range of motion and neck supple.      Right lower leg: No edema.      Left lower leg: No edema.   Skin:     General: Skin is warm and dry.      Capillary Refill: Capillary refill takes less than 2 seconds.      Comments: Diffuse skin tightening on both hands with autoamputation of multiple distal phalanxes  Ulcerations most notable in the right second toe and the left hallux (please see media)     Neurological:      Mental Status: She is alert and oriented to person, place, and time.          LINES, CATHETERS, DRAINS, AIRWAYS, AND WOUNDS   Lines, Drains, Airways, Wounds:  Peripheral IV (Adult) 05/21/24 Anterior;Left Hand (Active)   Number of days: 1       Comments:      LABS / IMAGING / TELE      Labs  I have reviewed the patient's labs to the time of note. No new clinical concern.      Results from last 7 days   Lab Units 05/22/24  0440 05/21/24  1335   SODIUM mEQ/L 140 138   POTASSIUM mEQ/L 4.6 5.3*   CHLORIDE mEQ/L 107 105   CO2 mEQ/L 21 23   BUN mg/dL 22 20   CREATININE mg/dL 1.0 1.0   CALCIUM mg/dL 8.8 9.5   GLUCOSE mg/dL 129* 135*      Results from last 7 days   Lab Units 05/22/24  0440 05/21/24  1335   WBC K/uL 7.50 11.76*   HEMOGLOBIN g/dL 10.0* 12.0   HEMATOCRIT % 32.8* 40.2   PLATELETS K/uL 253 289      Imaging personally reviewed(does not include unread studies):  CT ANGIOGRAPHY CHEST PULMONARY EMBOLISM WITH IV CONTRAST    Result Date: 5/22/2024  IMPRESSION: 1.  No evidence of acute pulmonary embolism. 2.  Multiple new perifissural left lower lobe nodules, suspicious for a neoplastic process. PET/CT and or tissue sampling is recommended. 3.  Chronic interstitial lung disease in an NSIP pattern, with overall slightly progressed appearance since March 2023. Pulmonary consultation is recommended. 4.   Stable cardiomegaly and large pericardial effusion. 5.  Mediastinal and supraclavicular adenopathy, similar to prior study, also indeterminate, which could be further evaluated at time of PET/CT. A preliminary report these findings provided by Agatha Ramos DO, May 21, 2024 10:45 PM In accordance with [place] Act 112,  the patient will receive a letter notifying them to follow up with their physician.    X-RAY CHEST 1 VIEW    Result Date: 5/21/2024  IMPRESSION: 1. Stable enlargement of the cardiac silhouette, in keeping with cardiomegaly and pericardial effusion noted on recent CT scan. 2. Diffuse hazy airspace opacity and interstitial prominence, suspicious for pulmonary edema. Other interstitial and airspace diseases would also have to be considered in the appropriate clinical situation. 3. Questioned vague 1.3 cm left upper lobe nodular opacity.     ECG/Telemetry  I have independently reviewed the telemetry. No events for the last 24 hours.    ASSESSMENT AND PLAN      Interstitial lung disease (CMS/[facility], located within [facility])  Assessment & Plan  Patient has a history of interstitial lung disease in the context of scleroderma  Recently started on tocilizumab with Dr. Trevizo her rheumatologist  Patient stated that her next dose will be due on Thursday, 5/23/2024    Impression: It is unclear if the patient is currently experiencing shortness of breath secondary to a flare of her interstitial lung disease.  Shortness of breath is likely multifactorial including interstitial lung disease, pericardial effusion, and pulmonary edema (volume overload)    Plan:  -Can continue tocilizumab   -Continue macitentan and sildenafil  -tapered off prednisone, on IV methylprednisolone 50 mg daily   -Will also consult pulmonology as the patient is known to Dr. Pratibha Abbott at [facility]    Acute hypoxic respiratory failure (CMS/[facility], located within [facility])  Assessment & Plan  Presented with progressively worsening shortness of breath over the last 3 days.  Patient was recently  discharged from [facility] on home oxygen.  Currently on 2 L in the emergency department and is saturating appropriately.  Known history of pericardial effusion last seen on transthoracic echo dated 4/10/2024.  POCUS in the emergency department showed evidence of pericardial effusion with no tamponade physiology  Patient also has a history of interstitial lung disease in the context of scleroderma  CT pulm angiogram shows no evidence of pulm embolism however there is evidence of bilateral changes concerning for volume overload  Patient did have rigors but no fevers.  Also has a cough that is productive of some sputum that is on occasion blood-tinged.    impression: Shortness of breath is most likely multifactorial including interstitial lung disease flare, pericardial effusion (which looks smaller on the study dated 5/21/2024), possibly some pulmonary edema secondary to volume overload in the context of pulmonary hypertension/hypertensive emergency as her BP was raised at 211/94 upon arrival.  Her cough may also be suggestive of a viral/bacterial etiology however this is less likely per the clinical picture and imaging. Patient is also at risk for PJP in light of immunocompromised state zoila with recent steroid course. Dyspnea related to tocilizumab is rare and unlikely in this case as the last dose was several weeks ago.     Plan:  -Continue home regimen macitentan and sildenafil  -tapered off prednisone, on IV methylprednisolone 50 mg daily   -f/u repeat TTE, consider one time dose IV diuretic per cardiology   -Discontinued vancomycin and cefepime, per ID  -f/u sputum for PJP PCR, fungal staining/culture, RPP    Multiple open wounds of lower extremity  Assessment & Plan  Patient has multiple ulcers in her lower most notably on the second toe of right foot as well as the hallux of her left foot  There is also an ulcer on the medial aspect of her right ankle  None of these have any surrounding erythema, oozing or  tenderness to palpation      Plan:  -Wound care    CAD (coronary artery disease)  Assessment & Plan  Patient has evidence of coronary artery calcification on CT pulm angiogram  Patient was suggested to start on Plavix however was not agreeable to same  Otherwise, patient is on statin therapy    Plan:  -Continue home regimen atorvastatin 40 mg once daily    Lung mass  Assessment & Plan  CT scan showed lung mass 16 mm in size in the left lower lobe in her admission in April.  This was also seen again in her CT pulmonary angiogram and this admission.    Patient does have a history of tobacco use disorder    Plan:  -Patient would benefit from outpatient PET/CT for further evaluation.  Patient is aware of same and will make arrangements for this upon her discharge    Debility  Assessment & Plan  Patient is very cachectic on clinical examination  Has 24 hours/day supportive care at home.  Is able to walk following with difficulty.  Uses assisting device for ambulation.  Wt Readings from Last 5 Encounters:   05/21/24 52.6 kg (116 lb)   04/17/24 53.5 kg (118 lb)   04/10/24 44.5 kg (98 lb)   03/16/24 44.6 kg (98 lb 5.2 oz)   02/01/24 47.6 kg (105 lb)     Weight was down to approximately 90 pounds a few months ago however was recently started on steroids and stated that her appetite has since increased.  Currently weighs 116 pounds however I believe that this may also be attributed at least in part to some    Impression: Likely severe debility secondary to chronic underlying disease affecting the patient's breathing and functional status overall.  Possibly underlying malignancy in the context of the patient's history of tobacco use disorder as well as lung nodule seen on CT scan.    Plan:  -Will consult palliative care  -Dietitian  -PT/OT   -f/u outpatient PET/CT to further characterize lung nodule    Pericardial effusion  Assessment & Plan  Ddx: autoimmune vs. Malignant versus other  Patient does have a history of  scleroderma.  Pericardial effusion was seen on prior admissions.    With tachycardia and worsening shortness of breath.  POCUS performed by the emergency staff showed no evidence of tamponade physiology   S/p Methylprednisolone 125 mg in ED   CT shows mild pericardial effusion. When compared to previous CT PA dated April, pericaldial effusion looks slightly smaller on the study done in this visit.   Lab Results   Component Value Date     (H) 05/22/2024    HSTROPONINI 24.1 (H) 05/21/2024    HSTROPONINI 32.2 (H) 05/21/2024     QuantiFERON dated 2/28/2024 was negative    Plan:  -Continue colchicine at 0.3 mg once daily  -tapered off prednisone, on IV methylprednisolone 50 mg daily   -f/u repeat TTE, consider one time dose IV diuretic per cardiology    Scleroderma (CMS/[facility])  Assessment & Plan  Known history of scleroderma with associated interstitial lung disease.  Known to Dr. Trevizo (rheumatology)  On home regimen tocilizumab with next dose scheduled for Thursday    Plan:  -Continue tocilizumab  -f/u CRP/ESR    Pulmonary hypertension (CMS/[facility])  Assessment & Plan  Patient with history of WHO group 1 pulm hypertension  On home regimen macitentan and sildenafil  Known to Dr. Timothy Arvizu from last admission at [facility]  Last echo dated 4/10/2024 showed LVEF 60%.  Estimated RVSP 51 mm Hg    Physical examination consistent with minimal amounts of volume overload with abdominal distention.  Lab Results   Component Value Date     (H) 05/22/2024     Plan:  -Continue home regimen sildenafil and macitentan  -Will hold off on IV lasix for now in light of pericardial effusion  -appreciate pulmonology and cardiology rec's         VTE Assessment: Padua    VTE Prophylaxis: Current anticoagulants:  enoxaparin (LOVENOX) syringe 40 mg, subcutaneous, Daily (6p)      Code Status: Full Code  Estimated discharge date: 5/24/2024     ATTENDING DOCUMENTATION  ALSO SEE ATTENDING ATTESTATION SECTION OF NOTE

## 2024-05-22 NOTE — ASSESSMENT & PLAN NOTE
Patient with history of WHO group 1 pulm hypertension  On home regimen macitentan and sildenafil  Known to Dr. Timothy Arvizu from last admission at [facility]  Last echo dated 4/10/2024 showed LVEF 60%.  Estimated RVSP 51 mm Hg    Physical examination consistent with minimal amounts of volume overload with abdominal distention.  Lab Results   Component Value Date     (H) 05/22/2024     Plan:  -Continue home regimen sildenafil and macitentan  -appreciate pulmonology and cardiology rec's

## 2024-05-22 NOTE — PLAN OF CARE
Problem: Adult Inpatient Plan of Care  Goal: Patient-Specific Goal (Individualized)  Outcome: Progressing  Flowsheets (Taken 5/22/2024 1318)  Patient/Family-Specific Goals (Include Timeframe): would like a rollator     Problem: Adult Inpatient Plan of Care  Goal: Plan of Care Review  Flowsheets (Taken 5/22/2024 1318)  Outcome Evaluation: OT ev completed/ REC home w/ A/ HC PT  Plan of Care Reviewed With: patient

## 2024-05-22 NOTE — ASSESSMENT & PLAN NOTE
Patient has multiple wounds noted on her feet bilaterally   Wound images from admission in the media tab   Is on CCB at home   States wounds have difficulty healing  Also now with poor nutritional status iso scleroderma and ILD     Ddx: nonhealing ulcers 2/2 autoimmune disease vs. 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 Has chronic nonhealing ulcers to her feet, most likely secondary to her scleroderma and poor nutritional status     - cont CCB  - wound ostomy on board   - consider pain management   - nutrition consult

## 2024-05-22 NOTE — ASSESSMENT & PLAN NOTE
-in the setting if ILD and pulmonary HTN 2/2 scleroderma.   -she has tried morphine in the past which helps her breathe but makes her nauseous. She does not want to take any opioid medications unless things were really dire - I let her know we could have them available as needed but she did not need to take them unless she really needed them. Can order SL morphine 3 mg q4hr PRN dyspnea   -she requests Tessalon pearls to help with cough

## 2024-05-22 NOTE — CONSULTS
Cardiology Pulmonary Hypertension  Consult Note        History of Present Illness:    Ms. Felix is a 63 y.o. female with a past medical history of Pulmonary HTN, HTN, Raynaud's Disease, Cutaneous Systemic Scleroderma (dx 1998), ILD, PE (3/2023). She is a patient of Dr. Nguyễn. She was previously followed by Dr. Jos Valdez at [facility]. Echocardiogram from 4/21/2022 showed LVEF: 55-60%, mild aortic valve thickening, pulmonary artery systolic pressure: 52 mmHg and trivial pericardial effusion. LHC and RHC (separate occasions) over the last 5-10 years which showed normal hemodynamics and normal coronaries. She had a RHC 9/02/2020 showing top-normal right sided filling pressures with mild pulmonary hypertension, top-normal PVR and normal pulmonary capillary wedge pressure. The cardiac index was normal, seen below.     On 6/27/2022, she was in [facility] [place] ER for chest pain. EKG: NSR without ischemic changes. hsTrop: 12->16, d-dimer: 0.56, CXR showed cardiomegaly and diffuse interstitial prominence.     Dr. Nguyễn ordered echocardiogram, which was completed on 8/22/2022 and showed estimated RVSP = 50-55 mmHg, normal-sized LV with normal LV systolic function. Estimated EF 55- 60%. Wall motion grossly normal, mild concentric left ventricular hypertrophy, grade I LV diastolic dysfunction, probably normal RV size and function.    At her initial visit on 10/11/2022, she reported that she felt very short of breath with minimal activity most of the time. She reports that this started about 1 year prior and had progressively gotten worse. She described PND and bendopnea. She reported that she experienced ankle swelling, worsened over the past year and usually worse towards the end of the night. She also reported some swelling in her abdomen. She reported daily palpations. I recommended a RHC which was done 11/28/2022 and showed: Normal right sided filling pressures. Mildly elevated left sided filling pressures. Precapillary  pulmonary hypertension with mean PA 36 mmHg.    She was hospitalized on 3/19/2023 at [facility] for PE diagnosed on V/Q scan. She reports that she had presented with left arm pain and heaviness. She states that she was started on Apixaban. One week after her last office visit (4/6/2023), she presented [place] [facility] [place] pain, epistaxis and hemoptysis. Echocardiogram showed: left ventricular cavity size normal with mild left ventricular hypertrophy and preserved systolic function. Estimated EF 65%. Chest CTA showed no evidence of PE; Apixaban was discontinued.     She had a repeat echocardiogram (6/2023) which showed: Normal left ventricular cavity size and mild concentric left ventricular hypertrophy. Normal systolic function. EF: 60%. Normal right ventricular structure and function. Mild tricuspid valve regurgitation with estimated RVSP: 55 mmHg. Compared to previous study from 4/14/2023, estimated right ventricular systolic pressure were higher.    At her last office visit on 7/25/2023, she reported that she was not sure if she received/started the Macitentan. She stated at the time we initiated it, her Pulmonologist also started her on a new medication (Mycophenolate), and she had significant GI intolerance with this. It was trialed at various doses but ultimately stopped. At that visit, I asked her to check if she had Macitentan and if she did to start it and monitor her SpO2.    She underwent V/Q scan in September 2023, which showed intermediate-to-high probability of pulmonary embolic disease, as a result she completed CTA Chest PE which showed: no evidence for filling defect or vessel cutoff to suggest pulmonary embolism. Enlargement of the pulmonary arteries compatible with pulmonary arterial hypertension was seen.    She started Macitentan around August or September 2023.    She presented to [facility] [place] 3/3/2024 with chest pressure, palpitations, nausea. She was noted to have hypoxia and tachypnea  at presentation. Found to have moderate to large pericardial effusion without tamponade.   She was started on colchicine 0.6 mg BID on 3/4, and when she did not improve sympomatically prednisone 20 mg daily was added on 3/5. on 3/7, developed multiple episodes of diarrhea and colchicine dose decreased to 0.3 mg. Coronary CTA done as part of the ischemic workup showed moderate 2 vessel CAD and patient recommended to begin atorvastatin 20 mg daily and clopidogrel 75 mg daily but she refused those two medications.     She was discharged on Colchicine 0.3 mg for at least 3 months and Prednisone 20 mg for a total two week course until 3/19, then decrease the dose to 10 mg daily for 2 weeks until 4/2/2024,  then decrease dose to 5 mg daily for 2 weeks until 4/16.     She reportedly tapered off prednosione as instructed but had continued to take Colchicine as prescribed.   She completed an echocardiogram 04/10 which revealed Small-to-moderate sized, circumferential pericardial effusion. No echocardiographic evidence for cardiac tamponade.   Compared to previous study from 3/4/2024, no significant change. Pericardial effusion size is similar, estimated right atrial pressure lower.     [place] [facility] [place] increased pleuritic chest pain. On presentation to the ER she was noted to be hypertensive and tachycardic. Labs showed mild elevated troponin, elevated BNP, leukocytosis CT angio of the chest showed no evidence of PE, did show a large pericardial effusion, emphysema, pulmonary hypertension, 16 mm left lobe lung mass. EKG showed sinus tachycardia, normal axis, nonspecific ST changes or NH changes. She was again started on Prednisone taper. She refused starting Clopidogrel.   She was discharged on 2L of O2.     She reports she started new injectable medication Tocilizumab prescribed by her Rheumatologist on 04/29.     [place] [facility] [place] at the request of her rheumatologist, during her visit there  "she was noted to be tachycardic and with some dyspnea, chest pain but improved from last admission.   On arrival satting appropriately, EKG sinus tachycardia  bpm. BNP >300, hsTrop 32 ->24.   POCUS in the emergency department showed evidence of pericardial effusion with no tamponade physiology. CT pulm angiogram shows no evidence of pulm embolism however there is evidence of bilateral changes concerning for volume overload.   She received Methylpredinsolone 125 mg in ED. Of note she was on Prednisone 10 mg daily at home.     Upon interview, she reported compliance with medication, however has been having a lot of palpitations, in the last few weeks, intermittent throughout the day but daily. Admits to some dyspnea but not worse than usual, but more shallow today, improved chest pain overall.   She reports improved appetite. Denies any PND or orthopnea, denies any LE swelling, admits to some abdominal swelling.       Past Medical / Surgical History:  Pulmonary HTN, HTN, Raynaud's Disease, Cutaneous Systemic Scleroderma (dx 1998), ILD, PE (3/2023), Follicular Carcinoma of Thyroid, Tenosynovitis, de Quervain, h/o Hernia Repair, h/o Appendicitis, h/o Digit Amputation, H/o Total Thyroidectomy (Dr. Pacheco at [facility]; 4/2013)    Family & Social History: She is , she has two children. She works as an .     Tobacco: former 2005  EtOH: never   Illicits: never     Her brother has CAD with stent  1st cousin with SLE  Both parents had \"heart problems\"    Allergies:   Allergies   Allergen Reactions   • Aspirin Shortness of breath     Other reaction(s): Unknown  \"stop breathing\"     • Ibuprofen Shortness of breath     Stop breathing   • Iodine Shortness of breath     Other reaction(s): Unknown   • Iodine And Iodide Containing Products Shortness of breath     ? rash   • Penicillins Shortness of breath     Other reaction(s): Unknown   • Sulfa (Sulfonamide Antibiotics) Angioedema   • Clindamycin    • " Hydrochlorothiazide      Other reaction(s): Abdominal Pain   Slow heart rate   • Oxycodone      Other reaction(s): Vomiting   • Sulfamethoxazole-Trimethoprim      Other reaction(s): Vomiting, Weakness    • Latex Rash       Medications:   Current Facility-Administered Medications   Medication Dose Route Frequency Provider Last Rate Last Admin   • amLODIPine (NORVASC) tablet 10 mg  10 mg oral q AM Sheryl Li MD   10 mg at 05/22/24 0844   • atorvastatin (LIPITOR) tablet 40 mg  40 mg oral Daily (6p) Sheryl Li MD       • ceFEPIme (MAXIPIME) 2 g in 100 mL NSS vial in bag  2 g intravenous q12h INT Sheryl Li MD   Stopped at 05/22/24 0639   • cholecalciferol (vitamin D3) tablet 1,000 Units  1,000 Units oral Daily Sheryl Li MD   1,000 Units at 05/22/24 0844   • colchicine (COLCRYS) tablet 0.3 mg  0.3 mg oral Daily Sheryl Li MD   0.3 mg at 05/22/24 0844   • cyanocobalamin (VITAMIN B12) tablet 1,000 mcg  1,000 mcg oral Daily Sheryl Li MD   1,000 mcg at 05/22/24 0844   • glucose chewable tablet 16-32 g of dextrose  16-32 g of dextrose oral PRN Sheryl Li MD        Or   • dextrose 40 % oral gel 15-30 g of dextrose  15-30 g of dextrose oral PRN Sheryl Li MD        Or   • glucagon (GLUCAGEN) injection 1 mg  1 mg intramuscular PRN Sheryl Li MD        Or   • dextrose 50 % in water (D50) injection 12.5 g  25 mL intravenous PRN Sheryl Li MD       • docusate sodium (COLACE) capsule 100 mg  100 mg oral Daily PRN Sheryl Li MD       • enoxaparin (LOVENOX) syringe 40 mg  40 mg subcutaneous Daily (6p) Sheryl Li MD       • ipratropium-albuteroL (DUO-NEB) 0.5-2.5 mg/3 mL nebulizer solution 3 mL  3 mL nebulization q6h SPENCER Sheryl Li MD   3 mL at 05/22/24 0213   • levothyroxine (SYNTHROID) tablet 100 mcg  100 mcg oral Daily (6:30a) Sheryl Li MD   100 mcg at 05/22/24 0505   • macitentan (OPSUMIT) tablet 10 mg  10 mg oral Daily Sheryl Li MD       • magnesium sulfate IVPB 2g in 50 mL  NSS/D5W/SWFI  2 g intravenous Once Sheryl Li MD 25 mL/hr at 05/22/24 0843 2 g at 05/22/24 0843   • meclizine (ANTIVERT) tablet 25 mg  25 mg oral Daily PRN Sheryl Li MD       • methylPREDNISolone sod suc(PF) (SOLU-Medrol) injection 40 mg  40 mg intravenous q6h INT Sheryl Li MD   40 mg at 05/22/24 0505   • pantoprazole (PROTONIX) tablet,delayed release (DR/EC) 40 mg  40 mg oral Daily Sheryl Li MD   40 mg at 05/22/24 0844   • sildenafiL (pulm.hypertension) (REVATIO) tablet 20 mg  20 mg oral TID Sheryl Li MD   20 mg at 05/22/24 0844   • vancomycin 500 mg/100 mL IVPB in NSS  500 mg intravenous q12h INT Sheryl Li MD           Review of Systems:   All other systems reviewed and are negative except as per HPI.    Physical Exam:     Wt Readings from Last 10 Encounters:   05/22/24 52.2 kg (115 lb 1.6 oz)   04/17/24 53.5 kg (118 lb)   04/10/24 44.5 kg (98 lb)   03/16/24 44.6 kg (98 lb 5.2 oz)   02/01/24 47.6 kg (105 lb)   07/25/23 50.8 kg (112 lb)   07/25/23 50.8 kg (112 lb)   06/21/23 51.3 kg (113 lb)   04/19/23 51.7 kg (113 lb 14.4 oz)   04/06/23 52.6 kg (116 lb)       BP Readings from Last 5 Encounters:   05/22/24 (!) 162/74   04/19/24 (!) 156/71   04/10/24 128/72   03/16/24 (!) 136/6   02/01/24 120/70       Vitals:    05/22/24 0715   BP: (!) 162/74   Pulse: 100   Resp: 16   Temp: 36.8 °C (98.2 °F)   SpO2: 96%       Body mass index is 18.58 kg/m².  Body surface area is 1.56 meters squared.    Constitutional: NAD, AAOx3  HENT: Normocephalic, atraumatic head, sclerae anicteric, no cervical lymphadenopathy, trachea midline  Cardiovascular: regular rhythm, fast rate, no murmurs, rubs or gallops, JVP: 11 cm H2O   cm H2O, no carotid bruits.  Respiratory: CTA bilateral lung fields, no wheezes, rales or rhonchi  GI: soft, non-tender/non-distended  Musculoskeletal / Extremities: no peripheral edema, +2 pulses bilateral radial, DP/PT arteries, skin tightening on face and fingertips  Skin: no rashes  Neuro /  Psych: no focal deficits, CNII-XII grossly intact, appropriate, cooperative    Diagnostic Data:     Results from last 7 days   Lab Units 05/22/24  0440 05/21/24  1506 05/21/24  1335   SODIUM mEQ/L 140  --  138   POTASSIUM mEQ/L 4.6  --  5.3*   CHLORIDE mEQ/L 107  --  105   CO2 mEQ/L 21  --  23   BUN mg/dL 22  --  20   CREATININE mg/dL 1.0  --  1.0   CALCIUM mg/dL 8.8  --  9.5   GLUCOSE mg/dL 129*  --  135*   MAGNESIUM mg/dL 1.8 1.7*  --      Results from last 7 days   Lab Units 05/22/24  0440 05/21/24  1335   WBC K/uL 7.50 11.76*   HEMOGLOBIN g/dL 10.0* 12.0   HEMATOCRIT % 32.8* 40.2   MCV fL 88.9 90.7   PLATELETS K/uL 253 289     Component      Latest [facility] 4/13/2023 3/3/2024 3/12/2024   Lactate      0.4 - 2.0 mmol/L 1.0  1.1  0.9      Component      Latest [facility] 4/13/2023 2/27/2024 3/3/2024   BNP      <=100 pg/mL 105 (H)  82  119 (H)       Component      Latest [facility] 3/3/2024   High Sens Troponin I      <15.0 pg/mL 12.6    High Sens Troponin I       12.2      Component      Latest [facility] 3/4/2024   CRP      <7.00 mg/L 8.90 (H)       Component      Latest [facility] 3/4/2024   Sed Rate      0 - 30 mm/hr 119 (H)       Component      Latest [facility] 3/3/2024   D-Dimer, Quant      0.00 - 0.50 ug/mL FEU 0.68 (H)       Component      Latest [facility] 4/13/2023   Hemoglobin A1C      <5.7 % 4.4        Component      Latest [facility] 4/14/2023   Triglycerides      30 - 149 mg/dL 44    Cholesterol      <=200 mg/dL 194    HDL      >=55 mg/dL 49 (L)    LDL Calculated      <=100 mg/dL 136 (H)    Non-HDL, Calculated      mg/dL 145    RISK      <=5.0  4.0       Component      Latest [facility] 4/13/2023   High Sens Troponin I      <15.0 pg/mL 23.0 (H)       Component      Latest Ref Rng 4/14/2023   Ferritin      11 - 250 ng/mL 75      Component      Latest Ref Rng 4/13/2023   TSH      0.34 - 5.60 mIU/L 4.71      Component      Latest Ref Rng 4/14/2023   Iron      35 - 150 ug/dL 29 (L)    TIBC      270 - 460 ug/dL 245 (L)    UIBC       180 - 360 ug/dL 216    Iron Saturation      15 - 45 % 12 (L)        Component      Latest Ref Rng 6/27/2022 4/13/2023   BNP      <=100 pg/mL 111 (H)  105 (H)                                     ---    ECG (5/21/2024, personally reviewed):   Sinus tachycardia   Nonspecific ST and T wave abnormality   HR: 129 bpm     ---    TTE (4/10/2024, personally reviewed):  1. Normal left ventricular cavity size with mild concentric left ventricular hypertrophy. Normal systolic function. EF: 60%. No regional wall motion abnormalities. Cannot determine diastolic function. Global longitudinal strain not reported due to technical limitations of study.   2. Aortic valve has three cusps. Trace aortic regurgitation. Aortic valve and root sclerosis.  3. Thickened mitral valve leaflets. Trace mitral valve regurgitation. Normal left atrial size.   4. Normal right ventricular structure and function. Trace tricuspid valve regurgitation with estimated RVSP: 51 mmHg (assuming right atrial pressure of 3 mmHg). Normal right atrial size. Pulmonic valve structurally normal. Trace pulmonic valve regurgitation. Pulsed wave Doppler of the RVOT systolic profile shows decreased acceleration times and geometry consistent with mildly elevated PVR.  5. IVC size is normal with > 50% inspiratory collapse consistent with normal right atrial pressure. Small-to-moderate sized, circumferential pericardial effusion. No echocardiographic evidence for cardiac tamponade.   6. Compared to previous study from 3/4/2024, no significant change. Pericardial effusion size is similar, estimated right atrial pressure lower.         CTA Abd / Pelvis (3/13/2024, personally reviewed):   There is dilatation of the of proximal/mid small bowel with relatively abrupt  transition left hemiabdomen, likely obstruction. Questionable lesion at the  transition point. This could be better evaluated with CT or MRI enterography.     Aorta and major branches patent. Superior mesenteric  artery patent without  significant stenosis.  Suspect moderate stenosis of the origins of the of renal arteries bilaterally.     Large pericardial effusion, no change.     Patchy opacities lower lungs, similar to prior.     TTE (3/4/2024, personally reviewed):   ? Normal-sized left ventricle. Mild left ventricular hypertrophy. Preserved systolic function. LVEF 60%. No regional wall motion abnormalities. Grade II diastolic dysfunction.  Normal global longitudinal strain (-20%).  ? The aortic valve is not well seen.  Cannot determine the number of cusps.  Sclerotic leaflets.  No aortic stenosis.  Trace aortic insufficiency.  ? Normal sized aortic root.  The visualized portion of the ascending aorta is normal in size.  ? The mitral valve leaflets are thickened. Mild mitral annular calcification. Trace mitral regurgitation.   ? Mildly dilated left atrium.  ? Normal-sized right ventricle. Normal right ventricular systolic function.  ? Thickened tricuspid valve. There is mild tricuspid regurgitation with estimated RVSP of 46 mmHg.   ? Pulmonic valve is not well visualized. Trace pulmonic regurgitation.   ? Mildly dilated right atrium.  ? IVC is enlarged with <50% respiratory variation consistent with elevated right atrial filling pressure (at least 15 mmHg).   ? Moderate, circumferential, circumferential pericardial effusion.  The largest pocket is located inferolaterally.  Elevated right atrial pressure.  No other clear echocardiographic features of increased pericardial pressure.  Correlate clinically.   ? Compared to previous TTE from 6/21/2023, pericardial effusion now moderate in size.  Right atrial pressure now elevated.       CTA Chest (3/3/2024):   IMPRESSION:  1.  No evidence of acute pulmonary embolism to the level of the segmental  branches.  2.  Moderate to large pericardial effusion measuring higher than simple fluid  density.  3.  Lower lobe lung field predominant interstitial thickening with some  relative  subpleural sparing, consistent with a chronic interstitial lung disease,  unchanged from the prior study.  4.  Continued bilateral axillary, mediastinal, and bilateral hilar  lymphadenopathy, not definitely changed from the prior study, possibly related  to the patient's sarcoid.  5.  Emphysema.     CTA Chest PE (10/4/2023):  IMPRESSION:  1. No evidence for filling defect or vessel cutoff to suggest pulmonary  embolism.  Enlargement of the pulmonary arteries compatible with pulmonary  arterial hypertension.  2. Changes throughout the lungs as described above which can be seen with  systemic sclerosis/scleroderma.  Underlying pneumonia the lung bases cannot be  entirely excluded in the proper clinical setting.  3.  Additional stable findings as described above.        VQ (9/12/2023):  IMPRESSION:  Intermediate to high probability of pulmonary embolic disease.     6MWT (7/25/2023, on Sildenafil 20 mg TID):        TTE (6/21/2023, personally reviewed):  1. Normal left ventricular cavity size and mild concentric left ventricular hypertrophy. Normal systolic function. EF: 60%. No regional wall motion abnormalities. Grade I diastolic dysfunction.   2. Aortic valve cusps not well visualized. Trace aortic regurgitation. Aortic valve and root sclerosis.  3. Thickened mitral valve leaflets. Mild mitral annular calcification. Trace mitral regurgitation. Mildly dilated left atrial size.   4. Normal right ventricular structure and function. Mild tricuspid valve regurgitation with estimated RVSP: 55 mmHg (assuming right atrial pressure of 3 mmHg). Normal right atrial size. Pulmonic valve not well visualized. Trace pulmonic valve regurgitation. Pulsed wave Doppler of the RVOT systolic profile show decreased acceleration times ( msec) and late systolic notching consistent with elevated PVR.   5. IVC size is normal with > 50% inspiratory collapse consistent with normal right atrial pressure. Small pericardial effusion  without echocardiographic evidence of tamponade.  6. Compared to previous study from 4/14/2023, estimated right ventricular systolic pressure is higher.        TTE (4/14/2023, personally reviewed):   ? The left ventricular cavity size is normal with mild left ventricular hypertrophy and preserved systolic function. Estimated EF 65%. No regional wall motion abnormalities. Grade I diastolic dysfunction.     ? The aortic valve is tricuspid. Aortic valve sclerosis. Trace aortic regurgitation.      ? The visualized portions of the aortic root and ascending aorta are normal in size.     ? The mitral valve is mildly thickened. Mild mitral annular calcification. Trace mitral regurgitation.       ? The left atrium is severely dilated.      ? The right ventricle is normal in size with preserved systolic function     ? The pulmonic valve is not well seen.     ? The tricuspid valve is normal in appearance. mild tricuspid regurgitation with an estimated RVSP of 34 mmHg.       ? Right atrium is normal in size.     ? The IVC is small and collapses > 50% with inspiration consistent with normal right atrial pressures.     ? Small pericardial effusion, mostly posterior.       ? Compared to previous echocardiogram from 8/22/2022, there is no significant change        TTE (11/28/2022, personally reviewed):   ? Normal right- and mildly elevated left-sided filling pressures (RA: 3 mmHg, PCWP: 13 mmHg)  ? Elevated pulmonary artery pressures (60/22(35 mmHg)) in the setting of PCWP: 13 mmHg.   ? Normal cardiac output and index (CO: 5.1 L/min, CI: 3.2 L/min/m2)  ? PVR: 4.3 Wood units. SVR: 1840 dynes/sec/cm5      TTE (8/22/2022, personally reviewed):  · Normal-sized LV. Normal LV systolic function. Estimated EF 55- 60%. Wall motion appears grossly normal. Mild concentric left ventricular hypertrophy. Grade I LV diastolic dysfunction.  · Pulmonic valve not well visualized. Grossly normal pulmonic valve structure. Trace pulmonic valve  regurgitation. No pulmonic valve stenosis.  · Aortic valve not well visualized. Aortic valve not well seen but likely trileaflet. Focal aortic valve calcification present. Sclerotic aortic valve leaflets. Mild aortic valve regurgitation. No aortic valve stenosis.  · RV not well visualized. Normal-sized RV. Normal RV systolic function.  · Normal-sized RA.  · There is a small loculated pericardial effusion anterior to the right atrium. No cardiac tamponade.  · Sclerotic mitral valve. Mitral valve calcification of the anterior leaflet present.  · Trace mitral valve regurgitation.  · No significant mitral valve stenosis.  · Normal-sized IVC. IVC demonstrates normal respiratory collapse.  · Tricuspid valve structure is grossly normal. Mild to moderate tricuspid valve regurgitation.  · Estimated RVSP = 50-55 mmHg.  · Mildly dilated LA.  · No previous echocardiogram available for comparison.     TTE (4/21/2022, outside study):  This result has an attachment that is not available.   •  A complete transthoracic echocardiogram (including 2D, color flow   Doppler, spectral Doppler and M-mode imaging) was performed using the   standard protocol. The study quality was adequate.   •  The left ventricle is normal in size. There is moderately increased   wall thickness consistent with moderate concentric hypertrophy.   Endocardium is incompletely visualized, but no regional wall motion   abnormalities are noted in the visualized segments. Normal left   ventricular ejection fraction. The left ventricular ejection fraction by   visual estimate is 55% to 60%.   •  The right ventricle is normal in size. There is normal function of the   right ventricle.   •  The left atrium is normal in size. Left atrial volume is 58 mL.   •  The right atrial volume measures 52 mL. The right atrial volume index   measures 34 mL/m2.   •  There is trace mitral regurgitation. There is no mitral stenosis.   •  The aortic valve is trileaflet. There is mild  aortic valve thickening.   There is no aortic stenosis. There is trace aortic regurgitation.   •  There is mild to moderate tricuspid regurgitation. Pulmonary artery   systolic pressure measures 52 mmHg. The pulmonary artery systolic pressure   is moderately elevated.   •  The aorta measures 3.2 cm at the sinus of Valsalva. The proximal   segment of the ascending aorta is mildly enlarged. The proximal segment of   the ascending aorta measures 3.4 cm. The proximal segment of the ascending   aorta measures 2.2 cm/m2, indexed for body surface area.   •  Trivial pericardial effusion present. There is no echocardiographic   evidence of cardiac tamponade.   •  The inferior vena cava is normal in size (diameter <21 mm) and   decreases >50% in size with inspiration, suggesting a normal right atrial   pressure of 3 mmHg (range 0-5 mm Hg).   •  Compared to the prior study of 3/13/2020, there are no significant   changes.        PFT (3/23/2022):      PFT (7/14/2021):      CT Chest (5/2/2021, outside study):  CLINICAL INFORMATION: Systemic sclerosis. Interstitial lung disease. Groundglass nodule     PROCEDURE: Unenhanced CT of the chest was performed using thin section reconstructions. Images were obtained on inspiration and expiration.     COMPARISON: June and August 2020     FINDINGS:     LUNGS, PLEURA:     Groundglass opacities with reticulation with subpleural sparing in the lower lobes, without honeycombing or architectural distortion, unchanged.     Right upper lobe groundglass nodule, image 114 of series 3, 8 x 11 mm, previously 6 mm.     Expiratory images are of diagnostic quality and do not demonstrate evidence of clinically significant air trapping.     CARDIOVASCULAR, MEDIASTINUM, THYROID: Coronary calcifications. Trace pericardial effusion.     LYMPH NODES: Subcentimeter mediastinal lymph nodes, unchanged. Unchanged axillary lymph nodes.     SKELETON, CHEST WALL: No destructive bone lesion     UPPER ABDOMEN:  Patulous esophagus. 3 mm right renal stone.       RHC (9/02/2020):  RA   8 mmHg   RV   40/5 mmHg   PA (mean)  44/16 (25) mmHg   PCWP   12 mmHg   CO   4.65 lpm (Thermodilution)   CI   2.65 lpm/m-squared   SVI    33 ml/beat/m-squared (lower limit normal 29)   PVR   2.8 mmHg/l/min (Wood units)   SVR   2064 dyne-sec-cm-5         PFT (3/18/2020):      CCTA (3/7/2024, personally reviewed):      LEFT MAIN: Patent.     LAD: Proximal: Calcified plaque with 30 to 50% stenosis.  Mid: Mixed plaque with  50 to 70% stenosis, CT FFR 0.89.  Distal: Patent.  D1: Patent  D2: Patent, CT FFR 0.88     LCX: The circumflex and 1 marginal branch are patent with normal CT FFR.     RCA: Proximal: Calcified plaque with minimal stenosis.  Otherwise the RCA, PDA,  and PLB are patent with normal CT FFR.     CORONARY CALCIUM COMPOSITE AGATSTON SCORE: 313  LM: LAD: 312    LCX: RCA: 1     This places the patient in the HICKS 96th risk percentile for age and gender  matched individuals.     VENTRICULAR FUNCTION AND WALL MOTION     LV EJECTION FRACTION: 66%  LV WALL MOTION: Normal    SUMMARY:  1. Two-vessel coronary artery disease as above that is moderate in severity.  CT  FFR is normal..  2. There is mitral annular calcification and aortic sclerosis.  The right atrium  is enlarged.  There is left ventricular hypertrophy.  There is a moderate to  large circumferential pericardial effusion.  3. Normal left ventricular wall motion and systolic function.  4. Coronary calcium score 313, 96th percentile     TTE (3/13/2020, outside study):  · The study quality was good.   · The left ventricle is normal in size. Top normal left ventricular wall   thickness. There are no segmental wall motion abnormalities. The left   ventricular ejection fraction is 55-60% by visual estimate and 3D   echocardiography. Normal left ventricular ejection fraction.   · There is grade I (mild) LV diastolic dysfunction.   · The right ventricle is normal in size. There is normal  function of the   right ventricle.   · The right atrium is mildly dilated.   · There is mild mitral valve anterior leaflet thickening. There is mild   mitral valve leaflet calcification. There is flattening of the mitral   leaflets without raphael prolapse. There is trace mitral regurgitation.   · The aortic valve is trileaflet. There is mild thickening of the aortic   valve. There is mild calcification of the aortic valve. There is no aortic   /stenosis. There is trace aortic regurgitation.   · There is mild tricuspid valve leaflet thickening. There is mild to   moderate tricuspid regurgitation. The pulmonary artery systolic pressure   is moderately elevated. Pulmonary artery systolic pressure measures 50   mmHg. There is mid-systolic notching of the RVOT VTI consistent with   elevated pulmonary vascular resistance.   · The inferior vena cava is normal in size (diameter <21 mm) and decreases   >50% in size with inspiration, suggesting a normal right atrial pressure   of 3 mmHg (range 0-5 mm Hg).   · Trivial loculated pericardial effusion present adjacent to the right   ventricle and adjacent to the right atrium.          Assessment / Plan:     1.Tachycardia - EKG sinus tachycardia HR 120s on admission. PE ruled out on CTA chest.     -POCUS revealed evidence of pericardial effusion with no tamponade physiology  -Get complete TTE  -Get TSH/t4 and Thiamine B1 level    2. Musculoskeletal Pain Including Chest Pain   -Echocardiogram 4/10 showed overall small-to-moderate sized, circumferential pericardial effusion similar to 3/4/2024 study.  -Repeat echocardiogram  - Suspect this is auto-immune disease related inflammation; Recently admitted (3/2024 and 4/2024) and treated for pericardial effusion treated with Colchicine and steroid taper.   -Continue Colchicine 0.3 mg. She completed prednisone taper x 2. Currently on Prednisone 10 mg daily at home. Received IV Methylprednisolone here.   - CCTA revealed moderate  non-obstructive CAD, calcium score of 313.     3. Pericardial Effusion  - Stable small-to-moderate in size on echo from 4/10 without clinical tamponade.   - Will recheck TTE this admission  -Continue Colchicine 0.3 mg daily. As above.     4. Pulmonary Hypertension (WHO Group I, NYHA/WHO FC III):   - Occurring with the background of Systemic Scleroderma with ILD. August 2022 TTE showed normal RV size and function, but progressive exertional decline, worsened DLCO and resting tachycardia suggest there may be progression of her pulmonary vascular disease and limitation of cardiac output. This was proven with 11/2022 RHC showing mean PA 35 mmHg (with PCWP 13 mmHg) and PVR 4.3 Wood units.  TTE from 04/2024 Normal right ventricular structure and function. Trace tricuspid valve regurgitation with estimated RVSP: 51 mmHg       ---    -She is very mildly intervascularly overlaoded, however she is satting well. BNP highest we've seen for her in a while.   -Get echocardiogram to further evaluate IVC and help with decision for one time dose if IV diuretic.   - Continue uninterrupted Sildenafil and Macitentan.     4. HTN:   - Elevated on arrival  - Continued Amlodipine.   - Would start HCTZ as second agent, if needed    5. Systemic Scleroderma / Raynaud's Disease:   - Per Rheumatology (Outpatient: Dr. Trevizo).   - She has not tolerated trials of Mycophenolate.   - She received first dose of Tocilizumab today     6. ILD:   - Per Pulmonology and Rheumatology (Dr. Trevizo) outpatient.   - She has not tolerated trials of Mycophenolate.   - She was started onTocilizumab.     7. CAD  - LAD and RCA non-obstructive disease on 3/7 CCTA  - LDL goal < 70  -She is allergic to ASA  -At last admission she refused Clopidogrel  -she is continued on Atorvastatin     9. Lung nodule - CTA chest revealed 16 mm left lower lobe masslike density which may represent atelectasis, lung cancer or lymphoma  She saw pulmonary last admission and recommended  outpatient PET CT        RAS Marcos

## 2024-05-22 NOTE — CONSULTS
Wound Ostomy Continence Note    Subjective    HPI Patient is a 63 y.o. female who was admitted on 5/21/2024 with a diagnosis of Palpitations [R00.2]  SVT (supraventricular tachycardia) (CMS/HCC) [I47.10]  Pericardial effusion [I31.39]  Weakness [R53.1]  Hemoptysis [R04.2]  Chest pain, unspecified type [R07.9].    Problem list Principal Problem:    Chest pain, unspecified type  Active Problems:    Interstitial lung disease (CMS/HCC)    Immunocompromised state (CMS/HCC)    Raynaud's disease    Pulmonary hypertension (CMS/HCC)    Shortness of breath    Scleroderma (CMS/HCC)    Pericardial effusion    Debility    Palliative care by specialist    Lung mass    CAD (coronary artery disease)    Multiple open wounds of lower extremity    Acute hypoxic respiratory failure (CMS/HCC)     PMH/PSH Past Medical History:   Diagnosis Date    De Quervain's tenosynovitis     Essential (primary) hypertension     Follicular carcinoma of thyroid (CMS/HCC)     Gastro-esophageal reflux     Hand ulceration (CMS/HCC)     Hyperlipidemia, unspecified     Interstitial lung disease (CMS/HCC)     Leg ulcer (CMS/HCC)     Lung nodule     Lupus (CMS/HCC)     Osteomyelitis of hand (CMS/HCC)     Osteoporosis     Pulmonary hypertension (CMS/HCC)     Raynaud's disease     Severe    Reflux esophagitis     Scleroderma (CMS/HCC)     Screening mammogram for breast cancer 05/04/2021    BI-RADS category: 1 - Negative (care everywhere @ [facility])     Past Surgical History:   Procedure Laterality Date    CARDIAC CATHETERIZATION      COLONOSCOPY      HERNIA REPAIR        Assessment and Recommendation Consult received for ulcers of bilateral lower extremities.  Patient with dry scab formation noted to multiple toes and right and left ankle. No drainage.  Can apply no sting skin barrier daily.     No further topical recommendations.  Signing off.             Date: 05/22/24  Signature: Pablito Nino RN

## 2024-05-22 NOTE — H&P
Internal Medicine  History & Physical        CHIEF COMPLAINT   63-year-old female advised to present to the emergency department by her primary rheumatologist for tachycardia, shortness of breath and feeling generally unwell.     HISTORY OF PRESENT ILLNESS      This is a 63 y.o. female with a past medical history of Pulm hypertension, Raynaud's disease, scleroderma diagnosed in 1998, interstitial lung disease, lung nodule, pulmonary embolism 2023, hypothyroidism, coronary artery disease, pericardial effusion, chronic lower extremity ulcers, hypertension, and hyperlipidemia who presented to the emergency department per advice from her rheumatologist with symptoms of shortness of breath, fatigue, and evidence of tachycardia during office visit.    She was recently discharged on 4/19/2024 where she was admitted for acute chest pain.  At that point she underwent a CT pulm angiogram which showed no evidence of pulm embolism however there was evidence of stable cardiomegaly and large pericardial effusion.  It was suspected that her symptoms were related to her underlying autoimmune disease.  She was advised to continue her steroids and was also recommended to start Plavix however was not amenable to this.  She was also advised to get repeat transthoracic echocardiogram in 1 month time.    In today's visit, the patient was seeing Dr. Trevizo (primary rheumatologist) at her office where she was found to be tachycardic, quite fatigued and short of breath after which she was advised to present to the emergency department for further assessment and management.  The patient herself reports that she has been feeling quite poor for last 3 days.  She reports persistence of her chest pain however to a lesser extent than when she came in last month.  Her shortness of breath is at baseline, however she reports a cough that is productive of some sputum that is occasionally blood-tinged.  Additionally, patient reports some right  during however no fevers.  She was discharged on oxygen and has been using it mostly at night.  She does report some decreased exercise tolerance and worsening dyspnea on exertion particularly noticed when she is walking back from the washroom.  Finally, the patient also reports some dizziness which is been ongoing for several weeks.    Otherwise, the patient denies any palpitations, nausea or vomiting, fevers or changes to her bowel or urinary habits.      Upon arrival to ED:  Vitals:  Temperature   36.6 °C (97.9 °F)  BP  (!) 211/94  HR  (!) 135  RR  (!) 22  SpO2  97 % on nasal cannula 2 L    LABS:  BMP    Lab Results   Component Value Date     05/21/2024    K 5.3 (H) 05/21/2024    BUN 20 05/21/2024    CREATININE 1.0 05/21/2024    CO2 23 05/21/2024    ANIONGAP 10 05/21/2024     CBC    Lab Results   Component Value Date    WBC 11.76 (H) 05/21/2024    HGB 12.0 05/21/2024    HCT 40.2 05/21/2024    MCV 90.7 05/21/2024     05/21/2024     Cardiac   Lab Results   Component Value Date    HSTROPONINI 24.1 (H) 05/21/2024    HSTROPONINI 32.2 (H) 05/21/2024     (H) 05/22/2024       Lab Results   Component Value Date    WBCU 4 TO 10 (A) 05/21/2024    RBCU 0 TO 4 05/21/2024    CLARITYU Clear 05/21/2024    GLUCOSEU Negative 05/21/2024    KETONESU Negative 05/21/2024    LEUKOCYTESU Trace (A) 05/21/2024    NITRITEU Negative 05/21/2024    BLOODU Negative 05/21/2024       IMAGING:   X-RAY CHEST 1 VIEW  IMPRESSION:  1. Stable enlargement of the cardiac silhouette, in keeping with cardiomegaly  and pericardial effusion noted on recent CT scan.  2. Diffuse hazy airspace opacity and interstitial prominence, suspicious for  pulmonary edema. Other interstitial and airspace diseases would also have to be  considered in the appropriate clinical situation.  3. Questioned vague 1.3 cm left upper lobe nodular opacity.     CT pulm angiogram ([facility] read)  CT pulm angiogram showed enlarged main pulmonary artery suggestive  of pulmonary hypertension.  Otherwise no filling defects suggestive of pulm embolism.  There was mild interlobular septal thickening suggestive of pulmonary edema/congestion, moderate emphysema and airway inflammation.  There was also bilateral lower lobe predominant subpleural interstitial reticulation suggestive of pulmonary fibrosis.    Left lower lobe nodular densities each measuring 1.6 cm with few other smaller new dominant nodules with PET/CT recommended.  There is evidence of cardiomegaly, mild pericardial effusion and severe coronary artery calcification.  Multiple prominent mediastinal lymph nodes    ECG:  ECG shows evidence of sinus tachycardia at 125 bpm.  Otherwise no acute ischemic changes.  QTc 461 ms    They were treated with IV methylprednisolone 125 mg IV, Benadryl 25 mg IV, Tylenol 975 mg and sildenafil 20 mg in the ED and subsequently admitted into our care on the general medicine floors.    The patient has confirmed that they would like to be FULL CODE.  DVT prophylaxis: enoxaparin.      PAST MEDICAL AND SURGICAL HISTORY      PMHx:  Past Medical History:   Diagnosis Date    De Quervain's tenosynovitis     Essential (primary) hypertension     Follicular carcinoma of thyroid (CMS/HCC)     Gastro-esophageal reflux     Hand ulceration (CMS/HCC)     Hyperlipidemia, unspecified     Interstitial lung disease (CMS/HCC)     Leg ulcer (CMS/HCC)     Lung nodule     Lupus (CMS/HCC)     Osteomyelitis of hand (CMS/HCC)     Osteoporosis     Pulmonary hypertension (CMS/HCC)     Raynaud's disease     Severe    Reflux esophagitis     Scleroderma (CMS/HCC)     Screening mammogram for breast cancer 05/04/2021    BI-RADS category: 1 - Negative (care everywhere @ [facility])       PSHx:  Past Surgical History:   Procedure Laterality Date    CARDIAC CATHETERIZATION      COLONOSCOPY      HERNIA REPAIR         PCP:   Sara Simmons MD    MEDICATIONS      Prior to Admission medications    Medication Sig Start Date End Date  Taking? Authorizing Provider   amLODIPine (NORVASC) 5 mg tablet Take 10 mg by mouth every morning.    ProviderKera MD   atorvastatin (LIPITOR) 40 mg tablet Take 1 tablet (40 mg total) by mouth daily. 4/19/24 7/18/24  Gerard Quach DO   biotin 1 mg tablet Take 1,000 mcg by mouth daily.    Romel Hidalgo MD   cholecalciferol, vitamin D3, 1,000 unit (25 mcg) tablet Take 1,000 Units by mouth daily.    Romel Hidalgo MD   colchicine (COLCRYS) 0.6 mg tablet Take 0.5 tablets (0.3 mg total) by mouth daily. 3/10/24 6/8/24  Corinne Ortez DO   cyanocobalamin, vitamin B-12, 1,000 mcg capsule Take 1,000 mcg by mouth daily. 2/14/20   Romel Hidalgo MD   docusate sodium (COLACE) 100 mg capsule Take 100 mg by mouth daily as needed for constipation.    Romel Hidalgo MD   levothyroxine (SYNTHROID) 100 mcg tablet Take 100 mcg by mouth daily. 7/6/20   Romel Hidalgo MD   macitentan (OPSUMIT) 10 mg tablet tablet Take 1 tablet (10 mg total) by mouth daily. 8/29/23   Radha Lal CRNP   meclizine (ANTIVERT) 25 mg tablet Take 25 mg by mouth daily as needed.    Romel Hidalgo MD   mupirocin (BACTROBAN) 2 % ointment Apply 1 application. topically daily. Behind ears    Kera Hidalgo MD   pantoprazole (PROTONIX) 40 mg EC tablet Take 1 tablet (40 mg total) by mouth daily Indications: stress ulcer prevention. 4/20/24 6/19/24  Gerard Quach DO   predniSONE (DELTASONE) 5 mg tablet Take 4 tabs (20 mg) by mouth for 2 weeks, then 3 tabs (15 mg) by mouth for 2 weeks, then 2 tabs (10mg) thereafter. Continue 10mg daily until you follow up with Rheumatology. 4/19/24 5/1/24  Gerard Quach DO   sildenafiL, pulm.hypertension, (REVATIO) 20 mg tablet Take 1 tablet (20 mg total) by mouth 3 (three) times a day. 1/4/24   Timothy Arvizu DO   tocilizumab (ACTEMRA) 200 mg/10 mL (20 mg/mL) solution Infuse 8 mg/kg into a venous catheter every 28 (twentyeight) days.    Provider, [facility]  Historical, MD       Home medications were personally reviewed.    ALLERGIES      Aspirin, Ibuprofen, Iodine, Iodine and iodide containing products, Penicillins, Sulfa (sulfonamide antibiotics), Clindamycin, Hydrochlorothiazide, Oxycodone, Sulfamethoxazole-trimethoprim, and Latex    FAMILY HISTORY      Family History   Problem Relation Age of Onset    Heart disease Biological Brother         stent    Breast cancer Niece     Cervical cancer Neg Hx     Uterine cancer Neg Hx     Colon cancer Neg Hx     Ovarian cancer Neg Hx        SOCIAL HISTORY      Social History     Socioeconomic History    Marital status:      Spouse name: None    Number of children: None    Years of education: None    Highest education level: None   Tobacco Use    Smoking status: Former     Types: Cigarettes     Quit date: 9/15/2005     Years since quittin.6    Smokeless tobacco: Never    Tobacco comments:     Would smoke 1/2 of 1/2 PPD, quit in    Vaping Use    Vaping Use: Never used   Substance and Sexual Activity    Alcohol use: Never    Drug use: Never    Sexual activity: Not Currently     Birth control/protection: Post-menopausal     Social Determinants of Health     Food Insecurity: No Food Insecurity (2024)    Hunger Vital Sign     Worried About Running Out of Food in the Last Year: Never true     Ran Out of Food in the Last Year: Never true   Transportation Needs: No Transportation Needs (3/4/2024)    PRAPARE - Transportation     Lack of Transportation (Medical): No     Lack of Transportation (Non-Medical): No   Housing Stability: Low Risk  (3/4/2024)    Housing Stability Vital Sign     Unable to Pay for Housing in the Last Year: No     Number of Places Lived in the Last Year: 1     Unstable Housing in the Last Year: No       REVIEW OF SYSTEMS      Review of Systems   Constitutional:  Positive for activity change and fatigue.   Respiratory:  Positive for shortness of breath.    Cardiovascular:  Positive for chest  pain. Negative for palpitations and leg swelling.   Neurological:  Positive for dizziness. Negative for syncope.       PHYSICAL EXAMINATION      Temp:  [36.5 °C (97.7 °F)-36.8 °C (98.2 °F)] 36.8 °C (98.2 °F)  Heart Rate:  [] 107  Resp:  [15-32] 15  BP: (132-211)/(60-94) 136/60  Body mass index is 18.58 kg/m².    Physical Exam  Constitutional:       Appearance: She is ill-appearing.      Comments: Very cachectic   HENT:      Mouth/Throat:      Mouth: Mucous membranes are moist.   Eyes:      Pupils: Pupils are equal, round, and reactive to light.   Cardiovascular:      Rate and Rhythm: Normal rate and regular rhythm.      Pulses: Normal pulses.      Heart sounds: Normal heart sounds.   Pulmonary:      Effort: Respiratory distress (In mild respiratory distress) present.      Breath sounds: Rales (Rales on inspiration  up to the midlung zones) present.   Abdominal:      General: Abdomen is flat. Bowel sounds are normal.      Palpations: Abdomen is soft.   Skin:     General: Skin is warm.      Capillary Refill: Capillary refill takes less than 2 seconds.      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      Mental Status: She is alert and oriented to person, place, and time. Mental status is at baseline.   Psychiatric:         Mood and Affect: Mood normal.       LABS / IMAGING / EKG        Labs:  Results from last 7 days   Lab Units 05/21/24  1335   SODIUM mEQ/L 138   POTASSIUM mEQ/L 5.3*   CHLORIDE mEQ/L 105   CO2 mEQ/L 23   BUN mg/dL 20   CREATININE mg/dL 1.0   CALCIUM mg/dL 9.5   GLUCOSE mg/dL 135*     Results from last 7 days   Lab Units 05/21/24  1335   WBC K/uL 11.76*   HEMOGLOBIN g/dL 12.0   HEMATOCRIT % 40.2    PLATELETS K/uL 289     Microbiology Data personally reviewed:  Microbiology Results       ** No results found for the last 720 hours. **            Imaging personally reviewed(does not include unread studies):  X-RAY CHEST 1 VIEW    Result Date: 5/21/2024  IMPRESSION: 1. Stable enlargement of the cardiac silhouette, in keeping with cardiomegaly and pericardial effusion noted on recent CT scan. 2. Diffuse hazy airspace opacity and interstitial prominence, suspicious for pulmonary edema. Other interstitial and airspace diseases would also have to be considered in the appropriate clinical situation. 3. Questioned vague 1.3 cm left upper lobe nodular opacity.     ECG/Telemetry  ECG shows evidence of sinus tachycardia at 125 bpm.  Otherwise no acute ischemic changes.  QTc 461 ms    ASSESSMENT AND PLAN           Acute hypoxic respiratory failure (CMS/HCC)  Assessment & Plan  Presented with progressively worsening shortness of breath over the last 3 days.  Patient was recently discharged from [facility] on home oxygen.  Currently on 2 L in the emergency department and is saturating appropriately.  Known history of pericardial effusion last seen on transthoracic echo dated 4/10/2024.  POCUS in the emergency department showed evidence of pericardial effusion with no tamponade physiology  Patient also has a history of interstitial lung disease in the context of scleroderma  CT pulm angiogram shows no evidence of pulm embolism however there is evidence of bilateral changes concerning for volume overload  Patient did have rigors but no fevers.  Also has a cough that is productive of some sputum that is on occasion blood-tinged.    impression: Shortness of breath is most likely multifactorial including interstitial lung disease flare, pericardial effusion (which looks smaller on the study dated 5/21/2024), possibly some pulmonary edema secondary to volume overload in the context of pulmonary hypertension/hypertensive emergency  as her BP was raised at 211/94 upon arrival.  Her cough may also be suggestive of a viral/bacterial etiology however this is less likely per the clinical picture and imaging. Patient is also at risk for PJP in light of immunocompromised state zoila with recent steroid course. Dyspnea related to tocilizumab is rare and unlikely in this case as the last dose was several weeks ago.     Plan:  -Continue home regimen macitentan (requested from pharmacy as its non-formulary) and sildenafil  -Will treat with steroids for now as this may be at least in part due to a flare of her interstitial lung disease  -Would benefit from repeat transthoracic echocardiogram  -Will give antibiotics as its difficult to rule out pneumonia. Will cover broadly with vancomycin and cefepime (recent admission). Will also consider PJP coverage. ID consulted  -Will consult cardiology for advice with regards to pericardial effusion  -Will consult rheumatology as the patient is on tocilizumab and is due for that on Thursday  -Will consult pulmonology for advice with regards to interstitial lung disease    Pericardial effusion  Assessment & Plan  Ddx: autoimmune vs. Malignant versus other  Patient does have a history of scleroderma.  Pericardial effusion was seen on prior admissions.    With tachycardia and worsening shortness of breath.  POCUS performed by the emergency staff showed no evidence of tamponade physiology   S/p Methylprednisolone 125 mg in ED   CT shows mild pericardial effusion. When compared to previous CT PA dated April, pericaldial effusion looks slightly smaller on the study done in this visit.   Lab Results   Component Value Date     (H) 05/22/2024    HSTROPONINI 24.1 (H) 05/21/2024    HSTROPONINI 32.2 (H) 05/21/2024     QuantiFERON dated 2/28/2024 was negative    Plan:  -Continue colchicine at 0.3 mg once daily  -Patient currently tapered off prednisone  -Will consult cardiology for further advice.  Would benefit from repeat  transthoracic echocardiogram as the patient is 1 month past her last admission.  This was recommended and cardiology is note from last admission.    Interstitial lung disease (CMS/HCC)  Assessment & Plan  Patient has a history of interstitial lung disease in the context of scleroderma  Recently started on tocilizumab with Dr. Trevizo her rheumatologist  Patient stated that her next dose will be due on Thursday, 5/23/2024    Impression: It is unclear if the patient is currently experiencing shortness of breath secondary to a flare of her interstitial lung disease.  Shortness of breath is likely multifactorial including interstitial lung disease, pericardial effusion, and pulmonary edema (volume overload)    Plan:  -Can continue tocilizumab.  Will consult rheumatology  -Will also consult pulmonology as the patient is known to Dr. Pratibha Abbott at [place]  -Continue macitentan and sildenafil  -Would continue steroids for now as it is difficult to discern if her shortness of breath is at least in part due to interstitial lung disease.    Pulmonary hypertension (CMS/HCC)  Assessment & Plan  Patient with history of WHO group 1 pulm hypertension  On home regimen macitentan and sildenafil  Known to Dr. Timothy Arvizu from last admission at [facility]  Last echo dated 4/10/2024 showed LVEF 60%.  Estimated RVSP 51 mm Hg    Physical examination consistent with minimal amounts of volume overload with abdominal distention.  Lab Results   Component Value Date     (H) 05/22/2024     Plan:  -Will consult cardiology  -Will also consult pulmonology  -Continue home regimen sildenafil and macitentan  -Will hold off on IV lasix for now in light of pericardial effusion    Debility  Assessment & Plan  Patient is very cachectic on clinical examination  Has 24 hours/day supportive care at home.  Is able to walk following with difficulty.  Uses assisting device for ambulation.  Wt Readings from Last 5 Encounters:   05/21/24 52.6 kg (116  lb)   04/17/24 53.5 kg (118 lb)   04/10/24 44.5 kg (98 lb)   03/16/24 44.6 kg (98 lb 5.2 oz)   02/01/24 47.6 kg (105 lb)     Weight was down to approximately 90 pounds a few months ago however was recently started on steroids and stated that her appetite has since increased.  Currently weighs 116 pounds however I believe that this may also be attributed at least in part to some    Impression: Likely severe debility secondary to chronic underlying disease affecting the patient's breathing and functional status overall.  Possibly underlying malignancy in the context of the patient's history of tobacco use disorder as well as lung nodule seen on CT scan.    Plan:  -Will consult palliative care  -Dietitian  -PT/OT   -patient will undergo PET/CT in the outpatient setting to further characterize lung logical    Multiple open wounds of lower extremity  Assessment & Plan  Patient has multiple ulcers in her lower most notably on the second toe of right foot as well as the hallux of her left foot  There is also an ulcer on the medial aspect of her right ankle  None of these have any surrounding erythema, oozing or tenderness to palpation      Plan:  -Wound care    CAD (coronary artery disease)  Assessment & Plan  Patient has evidence of coronary artery calcification on CT pulm angiogram  Patient was suggested to start on Plavix however was not agreeable to same  Otherwise, patient is on statin therapy    Plan:  -Continue home regimen atorvastatin 40 mg once daily    Lung mass  Assessment & Plan  CT scan showed lung mass 16 mm in size in the left lower lobe in her admission in April.  This was also seen again in her CT pulmonary angiogram and this admission.    Patient does have a history of tobacco use disorder    Plan:  -Patient would benefit from outpatient PET/CT for further evaluation.  Patient is aware of same and will make arrangements for this upon her discharge    Scleroderma (CMS/[facility])  Assessment & Plan  Known  history of scleroderma with associated interstitial lung disease.  Known to Dr. Trevizo (rheumatology)  On home regimen tocilizumab with next dose scheduled for Thursday    Plan:  -Continue tocilizumab  -Will consult rheumatology         VTE Assessment: Padua    VTE Prophylaxis: Current anticoagulants:  enoxaparin (LOVENOX) syringe 40 mg, subcutaneous, Daily (6p)      Palliative Care Screen:    Code Status: Full Code  Estimated discharge date: 5/26/2024  Discussed advanced care planning. Arielle has an ACP and Arielle's surrogate decision maker is Tiffanie Ko her Daughter.     ATTENDING DOCUMENTATION  ALSO SEE ATTENDING ATTESTATION SECTION OF NOTE

## 2024-05-22 NOTE — CONSULTS
Consult Note    Subjective     Arielle Felix is a 63 y.o. female with PMHx of scleroderma c/b pulm htn, Raynaud's, ILD, and PE.  Her course has been very complicated but most recently, she was started on tocilizumab (one month ago) and was seen for follow up in rheumatology clinic yesterday when she reported not feeling well and as found to be tachycardic.  She was sent to the ER, where intially she was diagnosed with SVT thought to be due a pneumonia and on ER ultrasound, had a significant pericardial effusion.  CT angio did not show PE but new LLL perifissural lesions.      Medical History:   Past Medical History:   Diagnosis Date    De Quervain's tenosynovitis     Essential (primary) hypertension     Follicular carcinoma of thyroid (CMS/HCC)     Gastro-esophageal reflux     Hand ulceration (CMS/HCC)     Hyperlipidemia, unspecified     Interstitial lung disease (CMS/HCC)     Leg ulcer (CMS/HCC)     Lung nodule     Lupus (CMS/HCC)     Osteomyelitis of hand (CMS/HCC)     Osteoporosis     Pulmonary hypertension (CMS/HCC)     Raynaud's disease     Severe    Reflux esophagitis     Scleroderma (CMS/HCC)     Screening mammogram for breast cancer 05/04/2021    BI-RADS category: 1 - Negative (care everywhere @ [facility])       Surgical History:   Past Surgical History:   Procedure Laterality Date    CARDIAC CATHETERIZATION      COLONOSCOPY      HERNIA REPAIR         Allergies: Aspirin, Ibuprofen, Iodine, Iodine and iodide containing products, Penicillins, Sulfa (sulfonamide antibiotics), Clindamycin, Hydrochlorothiazide, Oxycodone, Sulfamethoxazole-trimethoprim, and Latex    Current Inpatient Medications   Medication Dose Route Frequency Provider Last Rate Last Admin    amLODIPine (NORVASC) tablet 10 mg  10 mg oral q AM Sheryl Li MD   10 mg at 05/22/24 0844    atorvastatin (LIPITOR) tablet 40 mg  40 mg oral Daily (6p) Sheryl Li MD        benzonatate (TESSALON) capsule 200 mg  200 mg oral 3x daily PRN Tu  Bhargavi BAR MD   200 mg at 05/22/24 1215    ceFEPIme (MAXIPIME) 2 g in 100 mL NSS vial in bag  2 g intravenous q12h INT Sheryl Li MD   Stopped at 05/22/24 0639    cholecalciferol (vitamin D3) tablet 1,000 Units  1,000 Units oral Daily Sheryl Li MD   1,000 Units at 05/22/24 0844    colchicine (COLCRYS) tablet 0.3 mg  0.3 mg oral Daily Sheryl Li MD   0.3 mg at 05/22/24 0844    cyanocobalamin (VITAMIN B12) tablet 1,000 mcg  1,000 mcg oral Daily Sheryl Li MD   1,000 mcg at 05/22/24 0844    glucose chewable tablet 16-32 g of dextrose  16-32 g of dextrose oral PRN Sheryl Li MD        Or    dextrose 40 % oral gel 15-30 g of dextrose  15-30 g of dextrose oral PRN Sheryl Li MD        Or    glucagon (GLUCAGEN) injection 1 mg  1 mg intramuscular PRN Sheryl Li MD        Or    dextrose 50 % in water (D50) injection 12.5 g  25 mL intravenous PRN Sheryl Li MD        docusate sodium (COLACE) capsule 100 mg  100 mg oral Daily PRN Sheryl Li MD        enoxaparin (LOVENOX) syringe 40 mg  40 mg subcutaneous Daily (6p) Sheryl Li MD        ipratropium-albuteroL (DUO-NEB) 0.5-2.5 mg/3 mL nebulizer solution 3 mL  3 mL nebulization q6h SPENCER Sheryl Li MD   3 mL at 05/22/24 0213    levothyroxine (SYNTHROID) tablet 100 mcg  100 mcg oral Daily (6:30a) Sheryl Li MD   100 mcg at 05/22/24 0505    macitentan (OPSUMIT) tablet 10 mg  10 mg oral Daily Timothy Arvizu DO   10 mg at 05/22/24 1202    meclizine (ANTIVERT) tablet 25 mg  25 mg oral Daily PRN Sheryl Li MD        pantoprazole (PROTONIX) tablet,delayed release (DR/EC) 40 mg  40 mg oral Daily Sheryl Li MD   40 mg at 05/22/24 0844    [START ON 5/23/2024] predniSONE (DELTASONE) tablet 40 mg  40 mg oral Daily Clarisa Lopez,         sildenafiL (pulm.hypertension) (REVATIO) tablet 20 mg  20 mg oral TID Sheryl Li MD   20 mg at 05/22/24 0844    vancomycin 500 mg/100 mL IVPB in NSS  500 mg intravenous q12h INT Sheryl Li MD             Social History:   Social History     Socioeconomic History    Marital status:      Spouse name: None    Number of children: None    Years of education: None    Highest education level: None   Tobacco Use    Smoking status: Former     Types: Cigarettes     Quit date: 9/15/2005     Years since quittin.6    Smokeless tobacco: Never    Tobacco comments:     Would smoke 1/2 of 1/2 PPD, quit in    Vaping Use    Vaping Use: Never used   Substance and Sexual Activity    Alcohol use: Never    Drug use: Never    Sexual activity: Not Currently     Birth control/protection: Post-menopausal     Social Determinants of Health     Food Insecurity: No Food Insecurity (2024)    Hunger Vital Sign     Worried About Running Out of Food in the Last Year: Never true     Ran Out of Food in the Last Year: Never true   Transportation Needs: No Transportation Needs (3/4/2024)    PRAPARE - Transportation     Lack of Transportation (Medical): No     Lack of Transportation (Non-Medical): No   Housing Stability: Low Risk  (3/4/2024)    Housing Stability Vital Sign     Unable to Pay for Housing in the Last Year: No     Number of Places Lived in the Last Year: 1     Unstable Housing in the Last Year: No       Family History:   Family History   Problem Relation Age of Onset    Heart disease Biological Brother         stent    Breast cancer Niece     Cervical cancer Neg Hx     Uterine cancer Neg Hx     Colon cancer Neg Hx     Ovarian cancer Neg Hx        Vital signs in last 24 hours:  Temp:  [36.4 °C (97.6 °F)-36.8 °C (98.2 °F)] 36.4 °C (97.6 °F)  Heart Rate:  [] 115  Resp:  [15-32] 18  BP: (132-211)/(60-94) 151/69    Objective     Physical Exam  General: nad, seems mildy dyspneic  Msk:  multiple autoamputations, significant sclerodactyly    Labs  Results from last 7 days   Lab Units 24  0440   SODIUM mEQ/L 140   POTASSIUM mEQ/L 4.6   CHLORIDE mEQ/L 107   CO2 mEQ/L 21   BUN mg/dL 22   CREATININE mg/dL 1.0    CALCIUM mg/dL 8.8   GLUCOSE mg/dL 129*     Results from last 7 days   Lab Units 05/22/24  0440   WBC K/uL 7.50   HEMOGLOBIN g/dL 10.0*   HEMATOCRIT % 32.8*   PLATELETS K/uL 253       Imaging  CLINICAL HISTORY: Hemoptysis, shortness of breath, tachycardia. History of  scleroderma.     COMPARISON: Chest CT 4/16/2024     TECHNIQUE: CT angiography of the chest was performed prior to and following the  administration of IV contrast.  KV and/or mA were adjusted according to the  patient's size and body part for CT dose reduction. 3D MIP and/or volume  rendered image reconstruction performed and reviewed.     ADMINISTERED CONTRAST AND DOSE: 80mL of iopamidoL (ISOVUE-370) 370 mg iodine /mL  (76 %) injection 80 mL     COMMENT:     Lungs/Airways/Pleura:  There is diffuse interstitial abnormality involving all lung fields, most  severely in the bilateral lower lobes with traction bronchiectasis, interstitial  fibrosis, and subpleural sparing, compatible with chronic interstitial lung  disease in an NSIP pattern which can be seen with scleroderma. The overall  appearance is slightly progressed compared to prior CTs dating back to April 2023.     Central airways are patent.     There is a left perihilar masslike opacity measuring 18 mm (series 4 image 148)  and more inferiorly measuring 11 mm (series 4 image 160) which are stable dating  back to April 2023. There are multiple new left lower lobe perifissural  pulmonary nodules, for example as follows on series 4:  6 mm superior left lower lobe (image 89)  14 mm mid left lower lobe (image 112)  4 mm lower left lower lobe (image 162)  These findings are suspicious for a neoplastic process. Tissue sampling and/or  PET/CT is recommended previously are again recommended.     Scattered paraseptal and centrilobular emphysematous changes.     Biapical pleural-parenchymal scarring.     Lower Neck: within normal limits.  Axilla: No enlarged nodes.  Mediastinum and Miriam: There are  multiple abnormal mediastinal lymph nodes, for  example in the AP window measuring 12 x 8 mm (series 1 image 113, unchanged  compared to prior study from 4/16/2024. There is an enlarged left  supraclavicular lymph node which measures 11 x 7 mm (series 1 image 85) also  unchanged.  Heart and Pericardium: Cardiomegaly. Large pericardial effusion, unchanged.  Aorta:  Normal caliber.  Pulmonary arteries:  There is no pulmonary embolism. Prominence of the main  pulmonary artery which measures up to 27 mm, which may reflect pulmonary  arterial hypertension.  Esophagus: Normal caliber.  Upper abdomen:  There again is a enlarged prominent left renal vein superiorly  (series 1 images 268-302), partially imaged.  Chest Wall: Within normal limits.  Bones:  Within normal limits.     --  IMPRESSION:  1.  No evidence of acute pulmonary embolism.  2.  Multiple new perifissural left lower lobe nodules, suspicious for a  neoplastic process. PET/CT and or tissue sampling is recommended.  3.  Chronic interstitial lung disease in an NSIP pattern, with overall slightly  progressed appearance since March 2023. Pulmonary consultation is recommended.  4.  Stable cardiomegaly and large pericardial effusion.  5.  Mediastinal and supraclavicular adenopathy, similar to prior study, also  indeterminate, which could be further evaluated at time of PET/CT.      Assessment   63 y.o. female being consulted for scleroderma in setting of hospitalization for new onset tachycardia.    Patient needs pulmonary consult for new perifissural left lower lobe lesions.  Would hold on actemra until suspicion for neoplastic and infectious processes are low.  Dr. Trevizo will continue to follow.

## 2024-05-22 NOTE — CONSULTS
Pulmonary Consult Note     Indication for Consultation:  Dyspnea    Consulting Physician:  Dr. Ewing    Primary Care Physician:  Sara Simmons MD    Pulmonologist:  Dr. Wasserman     HISTORY OF PRESENT ILLNESS        62 y.o. female, well-known to our service, with a past medical history of Cutaneous Systemic Scleroderma (dx 1998) c/b ILD and Group 1 Pulmonary HTN, HFpEF, HTN, Raynaud's Disease, protein calorie malnutrition, hx OF PE , recent episode of pericarditis and pericardial effusion, chronic vertigo who presents who presents from her rheumatology office with SOB and tachycardia.    Of note, patient is known to our service, she has a history of group 1 PH occurring in the background of Systemic Scleroderma with ILD and a history of restrictive lung disease and ILD and used to follow at [facility]. She failed MMF and myfortic secondary to GI distress. She is adamant not to try that again. She had a LLL pulmonary nodule that had an SUV of 4.2 that was thought to be inflammatory back in july of 2023. She was hospitalized in march 2024  for acute dyspnea and was found to have pericarditis and subsequent pericardial effusion. She was started on colcichin and prednione taper.     She was again hospitalized on 4/16/2024 to 4/19/2024 after presenting with chest pain, felt to be inflammatory in the setting of headaches and pericarditis from the [facility].  She was evaluated by rheumatology and received first dose of IV tocilizumab on 4/18/2024.  She was also started on prednisone taper (20 mg daily, taper down by 5 mg every 2 weeks -stop tapering by 10 mg.    She presented to the ED following referral from rheumatology office when she presented for 15 follow-up post initiation of tocilizumab.  She was found to be tachycardic, dyspneic and quite fatigued.  Patient understands need for few days history of increasing fatigue and dyspnea.  She reports ongoing chest pain since her last hospitalization she has a chronic  cough, occasionally productive of blood-tinged sputum with chronic exertional dyspnea that is at baseline.  Denies any fever, chills or night sweats.    In the ED, she was hypertensive (211/94) and tachycardic to the 130s satting 95% on room air however desatted to 80 with ambulation, placed on 2 L nasal cannula with improvement in SpO2.  BMP  unremarkable, CBC with WBC 11.7, hemoglobin 12, platelets 289. .    PAST MEDICAL AND SURGICAL HISTORY        Past Medical History:   Diagnosis Date    De Quervain's tenosynovitis     Essential (primary) hypertension     Follicular carcinoma of thyroid (CMS/HCC)     Gastro-esophageal reflux     Hand ulceration (CMS/HCC)     Hyperlipidemia, unspecified     Interstitial lung disease (CMS/HCC)     Leg ulcer (CMS/HCC)     Lung nodule     Lupus (CMS/HCC)     Osteomyelitis of hand (CMS/HCC)     Osteoporosis     Pulmonary hypertension (CMS/HCC)     Raynaud's disease     Severe    Reflux esophagitis     Scleroderma (CMS/HCC)     Screening mammogram for breast cancer 05/04/2021    BI-RADS category: 1 - Negative (care everywhere @ [place])     Past Surgical History:   Procedure Laterality Date    CARDIAC CATHETERIZATION      COLONOSCOPY      HERNIA REPAIR       MEDICATIONS        Home Medications:    amLODIPine, Take 10 mg by mouth every morning.    atorvastatin, Take 1 tablet (40 mg total) by mouth daily.    biotin, Take 1,000 mcg by mouth daily.    cholecalciferol (vitamin D3), Take 1,000 Units by mouth daily.    colchicine, Take 0.5 tablets (0.3 mg total) by mouth daily.    cyanocobalamin (vitamin B-12), Take 1,000 mcg by mouth daily.    docusate sodium, Take 100 mg by mouth daily as needed for constipation.    levothyroxine, Take 100 mcg by mouth daily.    macitentan, Take 1 tablet (10 mg total) by mouth daily.    meclizine, Take 25 mg by mouth daily as needed.    mupirocin, Apply 1 application. topically daily. Behind ears    pantoprazole, Take 1 tablet (40 mg total) by mouth  daily Indications: stress ulcer prevention.    predniSONE, Take 4 tabs (20 mg) by mouth for 2 weeks, then 3 tabs (15 mg) by mouth for 2 weeks, then 2 tabs (10mg) thereafter. Continue 10mg daily until you follow up with Rheumatology.    sildenafiL (pulm.hypertension), Take 1 tablet (20 mg total) by mouth 3 (three) times a day.    ACTEMRA, Infuse 8 mg/kg into a venous catheter every 28 (twentyeight) days.    Current Medications:   amLODIPine  10 mg oral q AM    atorvastatin  40 mg oral Daily (6p)    cefepime  2 g intravenous q12h INT    cholecalciferol (vitamin D3)  1,000 Units oral Daily    colchicine  0.3 mg oral Daily    cyanocobalamin  1,000 mcg oral Daily    enoxaparin  40 mg subcutaneous Daily (6p)    ipratropium-albuteroL  3 mL nebulization q6h SPENCER    levothyroxine  100 mcg oral Daily (6:30a)    macitentan  10 mg oral Daily    magnesium sulfate  2 g intravenous Once    methylPREDNISolone sodium succinate  40 mg intravenous q6h INT    pantoprazole  40 mg oral Daily    sildenafiL (pulm.hypertension)  20 mg oral TID    vancomycin  500 mg intravenous q12h INT         ALLERGIES        Aspirin, Ibuprofen, Iodine, Iodine and iodide containing products, Penicillins, Sulfa (sulfonamide antibiotics), Clindamycin, Hydrochlorothiazide, Oxycodone, Sulfamethoxazole-trimethoprim, and Latex    FAMILY HISTORY        Family History   Problem Relation Age of Onset    Heart disease Biological Brother         stent    Breast cancer Niece     Cervical cancer Neg Hx     Uterine cancer Neg Hx     Colon cancer Neg Hx     Ovarian cancer Neg Hx      SOCIAL HISTORY        Social Determinants of Health     Tobacco Use: Medium Risk (5/21/2024)    Patient History     Smoking Tobacco Use: Former     Smokeless Tobacco Use: Never     Passive Exposure: Not on file   Alcohol Use: Not At Risk (5/22/2024)    AUDIT-C     Frequency of Alcohol Consumption: Never     Average Number of Drinks: Patient does not drink     Frequency of Binge Drinking:  "Never   Financial Resource Strain: Not on file   Food Insecurity: No Food Insecurity (5/21/2024)    Hunger Vital Sign     Worried About Running Out of Food in the Last Year: Never true     Ran Out of Food in the Last Year: Never true   Transportation Needs: No Transportation Needs (3/4/2024)    PRAPARE - Transportation     Lack of Transportation (Medical): No     Lack of Transportation (Non-Medical): No   Physical Activity: Not on file   Stress: Not on file   Social Connections: Not on file   Intimate Partner Violence: Not on file   Depression: Not at risk (5/22/2024)    Depression     Last PHQ-9: Not on file     Last PHQ-2: Flowsheet Data: 0   Housing Stability: Low Risk  (3/4/2024)    Housing Stability Vital Sign     Unable to Pay for Housing in the Last Year: No     Number of Places Lived in the Last Year: 1     Unstable Housing in the Last Year: No   Utilities: Not At Risk (3/4/2024)    [facility] Utilities     Threatened with loss of utilities: No   Interpersonal Safety: Not At Risk (5/22/2024)    Interpersonal Safety     SDOH IPS: Feels Safe at Home or Work/School: Not on file     SDOH IPS: Feels Threatened by Someone: Not on file     SDOH IPS: Does Anyone Try to Keep You From Having contact with Others or Doing Things Outside Your Home?: Not on file     SDOH IPS: Physical Signs of Abuse Present: Not on file     REVIEW OF SYSTEMS        A Full 10 point review of systems was obtained and is negative then otherwise stated in the HPI    PHYSICAL EXAMINATION      Vital Signs:   Visit Vitals  BP (!) 162/74 (BP Location: Left upper arm, Patient Position: Sitting)   Pulse 100   Temp 36.8 °C (98.2 °F) (Oral)   Resp 16   Ht 1.676 m (5' 6\")   Wt 52.2 kg (115 lb 1.6 oz)   LMP  (LMP Unknown)   SpO2 96%   BMI 18.58 kg/m²       I/O's:    Intake/Output Summary (Last 24 hours) at 5/22/2024 0906  Last data filed at 5/22/2024 0639  Gross per 24 hour   Intake 100 ml   Output --   Net 100 ml       General: The patient is in no acute " distress.  Resting comfortably in bed.  HEENT: Mucous membranes are moist.  Sclera are anicteric.  Neck: Supple.  No cervical lymphadenopathy  Cardiovascular: S1 and S2 are present.  There are no murmurs.  Lungs: Mildly decreased air entry at the bases, otherwise clear to auscultation bilaterally without wheezes or rhonchi  Abdomen: Soft, nontender and nondistended  Extremities: Cool and dry without edema  Neuro: Awake and alert.  Spontaneously moving all extremities    Diagnostic Data      Labs:    I have personally reviewed all pertinent patient laboratory results. Labs of note discussed below:    ABG Results         04/13/23     1949    Source Of Oxygen nc          BMP Results         05/22/24 05/21/24 04/18/24     0440 1335 0420     138 141    K 4.6 5.3 4.6    Cl 107 105 110    CO2 21 23 22    Glucose 129 135 96    BUN 22 20 29    Creatinine 1.0 1.0 0.9    Calcium 8.8 9.5 9.3    Anion Gap 12 10 9    EGFR >60.0 >60.0 >60.0           Comment for NA at 1335 on 05/21/24: Moderate hemolysis, results may be affected.     Comment for K at 1335 on 05/21/24: Results obtained on plasma. Plasma Potassium values may be up to 0.4 mEQ/L less than serum values. The differences may be greater for patients with high platelet or white cell counts.  Moderate hemolysis, results may be affected.     Comment for EGFR at 0440 on 05/22/24: Calculation based on the Chronic Kidney Disease Epidemiology Collaboration (CKD-EPI) equation refit without adjustment for race.    Comment for EGFR at 1335 on 05/21/24: Calculation based on the Chronic Kidney Disease Epidemiology Collaboration (CKD-EPI) equation refit without adjustment for race.    Comment for EGFR at 0420 on 04/18/24: Calculation based on the Chronic Kidney Disease Epidemiology Collaboration (CKD-EPI) equation refit without adjustment for race.          CBC Results         05/22/24 05/21/24 04/18/24     0440 1335 0420    WBC 7.50 11.76 8.40    RBC 3.69 4.43 3.54    HGB 10.0  12.0 9.8    HCT 32.8 40.2 32.8    MCV 88.9 90.7 92.7    MCH 27.1 27.1 27.7    MCHC 30.5 29.9 29.9     289 254          Microbiology Results       ** No results found for the last 720 hours. **          Imaging:    I have reviewed all pertinent imaging which is discussed below:  No PE, chronic DPLD with most with traction bronchiectasis, interstitial  Fibrosis with worsening b/l lower lobe GGO, and subpleural sparing with multiple lung nodules, some new from before.     ASSESSMENT AND RECOMMENDATIONS         62 y.o. female, well-known to our service, with a past medical history of Cutaneous Systemic Scleroderma (dx 1998) c/b ILD and Group 1 Pulmonary HTN, HFpEF, HTN, Raynaud's Disease, protein calorie malnutrition, hx OF PE , recent episode of pericarditis and pericardial effusion, chronic vertigo who presents who presents from her rheumatology office with SOB and tachycardia.    Chronic hypoxemic respiratory failure.  Cutaneous systemic scleroderma with subsequent ILD and Group 1 pulmonary hypertension  Acute on chronic HFpEF   Recent pericarditis with subsequent pericardial effusion   Left lower lobe nodule, enlarged over the past year.  Previous PET scan with an SUV of 4.1.  Multiple lung nodules, new perifissural left lower lobe nodules.      Dyspnea is likely multifactorial. She is mildly volume overloaded in the setting of known ILD, pulmonary HTN, pericardial effusion and severe deconditioning.    -Recommend gentle diuresis and rechecking a TTE to evaluate effusion   -She was started on IV methylprednisolone, would decrease to 1 mg/kg daily (50 mg daily for now)   -Needs an outpatient repeat PET CT scan for evaluation of left lower lobe nodules  -please give second dose of tocilizumab (scheduled for tomorrow 5/23) if possible  -please check repeat CRP/ESR   -Check ambulatory pulse ox     Please page [phone] with any questions/concerns        Rayray Martinez MD  PGY-4  Pulmonary/Critical Care  Fellow  5/22/2024

## 2024-05-22 NOTE — ASSESSMENT & PLAN NOTE
Ms. Felix is a 63 year old woman with PMH scleroderma with ILD, pulmonary HTN who presented with tachycardia and dyspnea.    -code status: remains full code, goals life prolonging - see initial consult note for details of conversation but I had recommended considering DNR code status  -I provided her with advance directive paperwork   -2 adult children would share decision making in the absence of HCPOA documentation  -has significant home support but would benefit from palliative bridge and/or palliative NP support  -will continue to follow

## 2024-05-22 NOTE — ASSESSMENT & PLAN NOTE
-at baseline lives at home with family and/or aides 24 hours a day   -here with tachycardia and dyspnea   -high risk for further decline in the setting of frailty and medical conditions

## 2024-05-22 NOTE — PLAN OF CARE
Care Coordination Admission Assessment Note    General Information:  Readmission Within the last 30 days: no previous admission in last 30 days  Does patient have a : No  Patient-Specific Goals (include timeframe):      Living Arrangements:  Arrived From: home  Current Living Arrangements: home  People in Home: other (see comments)  Home Accessibility: stairs to enter home (Group), stairs within home (Group)  Living Arrangement Comments: pt lives in 2 story home with 6 LARRY F/F steps inside with bed/bathroom upstairs, pt has a 24/7 HHA through [employer]    Housing Stability and Utility Access (SDOH):  In the last 12 months, was there a time when you were not able to pay the mortgage or rent on time?: No  In the last 12 months, how many places have you lived?:    In the last 12 months, was there a time when you did not have a steady place to sleep or slept in a shelter (including now)?: No  In the past 12 months has the electric, gas, oil, or water company threatened to shut off services in your home?:      Functional Status Prior to Admission:   Assistive Device/Animal Currently Used at Home: walker, standard, walker, front-wheeled, scale, oxygen  Functional Status Comments: pt confirms she has a portable O2 tank  IADL Comments:       Supports and Services:  Current Outpatient/Agency/Support Group:    Type of Current Home Care Services: home health aide  History of home care episode or rehab stay:      Discharge Needs Assessment:   Concerns to be Addressed: discharge planning, care coordination/care conferences  Current Discharge Risk:    Anticipated Changes Related to Illness:      Patient/Family Anticipated Discharge Plan:  Patient/Family Anticipates Transition To: home with help/services  Patient/Family Anticipated Services at Transition: home health care    Connection to Community       Patient Choice:   Offered/Gave Vendor List: yes  Patient's Choice of Community Agency(s): [facility]  Patient  and/or patient guardian/advocate was made aware of their right to choose a provider. A list of eligible providers was presented and reviewed with the patient and/or patient guardian/advocate in written and/or verbal form. The list delineates providers in the patient’s desired geographic area who are participating in the Medicare program and/or providers contracted with the patient’s primary insurance. The Medicare list and quality ratings were obtained from the Medicare.gov [medicare.gov] website.    Anticipated Discharge Plan:  Met with patient. Provided education and contact information for Care Coordination services.: yes  Anticipated Discharge Disposition: home with home health  Type of Home Care Services: home OT, home PT, nursing    Transportation Needs (SDOH):  Transportation Concerns:    Transportation Anticipated: family or friend will provide  Is Out of Hospital DNR needed at discharge?: no    In the past 12 months, has lack of transportation kept you from medical appointments or from getting medications?: No  In the past 12 months, has lack of transportation kept you from meetings, work, or from getting things needed for daily living?: No    Concerns - comments: s/w pt to complete A, pt will have ride home with dtrs, open with [facility]

## 2024-05-23 ENCOUNTER — APPOINTMENT (INPATIENT)
Dept: CARDIOLOGY | Facility: HOSPITAL | Age: 64
DRG: 291 | End: 2024-05-23
Payer: COMMERCIAL

## 2024-05-23 LAB
ANION GAP SERPL CALC-SCNC: 10 MEQ/L (ref 3–15)
ATRIAL RATE: 100
BASOPHILS # BLD: 0.06 K/UL (ref 0.01–0.1)
BASOPHILS NFR BLD: 0.4 %
BSA FOR ECHO PROCEDURE: 1.56 M2
BUN SERPL-MCNC: 34 MG/DL (ref 7–25)
CALCIUM SERPL-MCNC: 8.7 MG/DL (ref 8.6–10.3)
CHLORIDE SERPL-SCNC: 105 MEQ/L (ref 98–107)
CO2 SERPL-SCNC: 23 MEQ/L (ref 21–31)
CREAT SERPL-MCNC: 1.1 MG/DL (ref 0.6–1.2)
CRP SERPL-MCNC: 6.5 MG/L
DIFFERENTIAL METHOD BLD: ABNORMAL
E WAVE DECELERATION TIME: 250 MS
E/A RATIO: 0.7
E/E' RATIO: 14.8
E/LAT E' RATIO: 13.7
EDV (BP): 67.6 CM3
EF (A4C): 68.6 %
EF A2C: 59.6 %
EGFRCR SERPLBLD CKD-EPI 2021: 56.6 ML/MIN/1.73M*2
EJECTION FRACTION: 65.4 %
EOSINOPHIL # BLD: 0.09 K/UL (ref 0.04–0.36)
EOSINOPHIL NFR BLD: 0.6 %
ERYTHROCYTE [DISTWIDTH] IN BLOOD BY AUTOMATED COUNT: 16.1 % (ref 11.7–14.4)
ERYTHROCYTE [DISTWIDTH] IN BLOOD BY AUTOMATED COUNT: 16.7 % (ref 11.7–14.4)
ERYTHROCYTE [SEDIMENTATION RATE] IN BLOOD BY WESTERGREN METHOD: 79 MM/HR
ESV (BP): 23.4 CM3
GLUCOSE SERPL-MCNC: 67 MG/DL (ref 70–99)
HCT VFR BLD AUTO: 31.8 % (ref 35–45)
HCT VFR BLD AUTO: 31.9 % (ref 35–45)
HGB BLD-MCNC: 9.6 G/DL (ref 11.8–15.7)
HGB BLD-MCNC: 9.9 G/DL (ref 11.8–15.7)
IMM GRANULOCYTES # BLD AUTO: 0.08 K/UL (ref 0–0.08)
IMM GRANULOCYTES NFR BLD AUTO: 0.5 %
LAAS-AP4: 17.1 CM2
LALD A4C: 5.32 CM
LEFT ATRIUM VOLUME INDEX: 28.01 CM3/M2
LEFT ATRIUM VOLUME: 43.7 CM3
LEFT VENTRICLE DIASTOLIC VOLUME INDEX: 43.46 CM3/M2
LEFT VENTRICLE DIASTOLIC VOLUME: 67.8 CM3
LEFT VENTRICLE SYSTOLIC VOLUME INDEX: 13.65 CM3/M2
LEFT VENTRICLE SYSTOLIC VOLUME: 21.3 CM3
LV DIASTOLIC VOLUME: 62 CM3
LV ESV (APICAL 2 CHAMBER): 25.1 CM3
LVAD-AP2: 24.4 CM2
LVAD-AP4: 24.6 CM2
LVAS-AP2: 14.3 CM2
LVAS-AP4: 12.9 CM2
LVEDVI(A2C): 39.74 CM3/M2
LVEDVI(BP): 43.33 CM3/M2
LVESVI(A2C): 16.09 CM3/M2
LVESVI(BP): 15 CM3/M2
LVLD-AP2: 8.04 CM
LVLD-AP4: 7.38 CM
LVLS-AP2: 6.98 CM
LVLS-AP4: 6.79 CM
LYMPHOCYTES # BLD: 2.09 K/UL (ref 1.2–3.5)
LYMPHOCYTES NFR BLD: 13.9 %
MAGNESIUM SERPL-MCNC: 2.2 MG/DL (ref 1.8–2.5)
MCH RBC QN AUTO: 27 PG (ref 28–33.2)
MCH RBC QN AUTO: 27.2 PG (ref 28–33.2)
MCHC RBC AUTO-ENTMCNC: 30.1 G/DL (ref 32.2–35.5)
MCHC RBC AUTO-ENTMCNC: 31.1 G/DL (ref 32.2–35.5)
MCV RBC AUTO: 86.9 FL (ref 83–98)
MCV RBC AUTO: 90.4 FL (ref 83–98)
MONOCYTES # BLD: 0.95 K/UL (ref 0.28–0.8)
MONOCYTES NFR BLD: 6.3 %
MV E'TISSUE VEL-LAT: 0.06 M/S
MV E'TISSUE VEL-MED: 0.06 M/S
MV PEAK A VEL: 1.2 M/S
MV PEAK E VEL: 0.84 M/S
NEUTROPHILS # BLD: 11.82 K/UL (ref 1.7–7)
NEUTS SEG NFR BLD: 78.3 %
NRBC BLD-RTO: 0 %
P AXIS: 59
PDW BLD AUTO: 11.8 FL (ref 9.4–12.3)
PDW BLD AUTO: 12 FL (ref 9.4–12.3)
PLATELET # BLD AUTO: 271 K/UL (ref 150–369)
PLATELET # BLD AUTO: 283 K/UL (ref 150–369)
POTASSIUM SERPL-SCNC: 4.3 MEQ/L (ref 3.5–5.1)
PR INTERVAL: 188
QRS DURATION: 84
QT INTERVAL: 358
QTC CALCULATION(BAZETT): 461
R AXIS: 13
RBC # BLD AUTO: 3.53 M/UL (ref 3.93–5.22)
RBC # BLD AUTO: 3.66 M/UL (ref 3.93–5.22)
SEPTAL TISSUE DOPPLER FREE WALL LATE DIA VELOCITY (APICAL 4 CHAMBER VIEW): 0.13 M/S
SODIUM SERPL-SCNC: 138 MEQ/L (ref 136–145)
T WAVE AXIS: 105
VENTRICULAR RATE: 100
WBC # BLD AUTO: 15.09 K/UL (ref 3.8–10.5)
WBC # BLD AUTO: 16.11 K/UL (ref 3.8–10.5)

## 2024-05-23 PROCEDURE — 83735 ASSAY OF MAGNESIUM: CPT

## 2024-05-23 PROCEDURE — 93325 DOPPLER ECHO COLOR FLOW MAPG: CPT | Mod: 26 | Performed by: INTERNAL MEDICINE

## 2024-05-23 PROCEDURE — 21400000 HC ROOM AND CARE CCU/INTERMEDIATE

## 2024-05-23 PROCEDURE — 97530 THERAPEUTIC ACTIVITIES: CPT | Mod: GP

## 2024-05-23 PROCEDURE — 93308 TTE F-UP OR LMTD: CPT

## 2024-05-23 PROCEDURE — 85025 COMPLETE CBC W/AUTO DIFF WBC: CPT

## 2024-05-23 PROCEDURE — 63700000 HC SELF-ADMINISTRABLE DRUG: Performed by: STUDENT IN AN ORGANIZED HEALTH CARE EDUCATION/TRAINING PROGRAM

## 2024-05-23 PROCEDURE — 99232 SBSQ HOSP IP/OBS MODERATE 35: CPT | Performed by: INTERNAL MEDICINE

## 2024-05-23 PROCEDURE — 36415 COLL VENOUS BLD VENIPUNCTURE: CPT

## 2024-05-23 PROCEDURE — 93308 TTE F-UP OR LMTD: CPT | Mod: 26 | Performed by: INTERNAL MEDICINE

## 2024-05-23 PROCEDURE — 97162 PT EVAL MOD COMPLEX 30 MIN: CPT | Mod: GP

## 2024-05-23 PROCEDURE — 93005 ELECTROCARDIOGRAM TRACING: CPT

## 2024-05-23 PROCEDURE — 63600000 HC DRUGS/DETAIL CODE: Mod: JZ

## 2024-05-23 PROCEDURE — 63700000 HC SELF-ADMINISTRABLE DRUG

## 2024-05-23 PROCEDURE — 85652 RBC SED RATE AUTOMATED: CPT | Performed by: STUDENT IN AN ORGANIZED HEALTH CARE EDUCATION/TRAINING PROGRAM

## 2024-05-23 PROCEDURE — 80048 BASIC METABOLIC PNL TOTAL CA: CPT

## 2024-05-23 PROCEDURE — 93321 DOPPLER ECHO F-UP/LMTD STD: CPT | Mod: 26 | Performed by: INTERNAL MEDICINE

## 2024-05-23 PROCEDURE — 25000000 HC PHARMACY GENERAL

## 2024-05-23 PROCEDURE — 63600000 HC DRUGS/DETAIL CODE: Mod: JW

## 2024-05-23 PROCEDURE — 93005 ELECTROCARDIOGRAM TRACING: CPT | Performed by: HOSPITALIST

## 2024-05-23 PROCEDURE — 99233 SBSQ HOSP IP/OBS HIGH 50: CPT | Performed by: HOSPITALIST

## 2024-05-23 PROCEDURE — 86140 C-REACTIVE PROTEIN: CPT | Performed by: STUDENT IN AN ORGANIZED HEALTH CARE EDUCATION/TRAINING PROGRAM

## 2024-05-23 PROCEDURE — 84484 ASSAY OF TROPONIN QUANT: CPT

## 2024-05-23 PROCEDURE — 93010 ELECTROCARDIOGRAM REPORT: CPT | Performed by: INTERNAL MEDICINE

## 2024-05-23 PROCEDURE — 85027 COMPLETE CBC AUTOMATED: CPT

## 2024-05-23 PROCEDURE — 25800000 HC PHARMACY IV SOLUTIONS

## 2024-05-23 PROCEDURE — 63600000 HC DRUGS/DETAIL CODE: Mod: JZ | Performed by: STUDENT IN AN ORGANIZED HEALTH CARE EDUCATION/TRAINING PROGRAM

## 2024-05-23 RX ORDER — HYDROMORPHONE HYDROCHLORIDE 1 MG/ML
0.25 INJECTION, SOLUTION INTRAMUSCULAR; INTRAVENOUS; SUBCUTANEOUS EVERY 6 HOURS PRN
Status: DISCONTINUED | OUTPATIENT
Start: 2024-05-23 | End: 2024-05-24 | Stop reason: HOSPADM

## 2024-05-23 RX ORDER — PREDNISONE 20 MG/1
40 TABLET ORAL DAILY
Status: DISCONTINUED | OUTPATIENT
Start: 2024-05-24 | End: 2024-05-24

## 2024-05-23 RX ORDER — FUROSEMIDE 10 MG/ML
20 INJECTION INTRAMUSCULAR; INTRAVENOUS ONCE
Status: COMPLETED | OUTPATIENT
Start: 2024-05-23 | End: 2024-05-23

## 2024-05-23 RX ORDER — PREDNISONE 20 MG/1
20 TABLET ORAL DAILY
Status: DISCONTINUED | OUTPATIENT
Start: 2024-05-30 | End: 2024-05-24

## 2024-05-23 RX ADMIN — SILDENAFIL 20 MG: 20 TABLET, FILM COATED ORAL at 14:31

## 2024-05-23 RX ADMIN — CYANOCOBALAMIN TAB 1000 MCG 1000 MCG: 1000 TAB at 10:39

## 2024-05-23 RX ADMIN — LEVOTHYROXINE SODIUM 100 MCG: 0.1 TABLET ORAL at 05:32

## 2024-05-23 RX ADMIN — IPRATROPIUM BROMIDE AND ALBUTEROL SULFATE 3 ML: .5; 3 SOLUTION RESPIRATORY (INHALATION) at 18:04

## 2024-05-23 RX ADMIN — SILDENAFIL 20 MG: 20 TABLET, FILM COATED ORAL at 20:39

## 2024-05-23 RX ADMIN — FUROSEMIDE 20 MG: 10 INJECTION, SOLUTION INTRAMUSCULAR; INTRAVENOUS at 10:38

## 2024-05-23 RX ADMIN — Medication 1000 UNITS: at 10:39

## 2024-05-23 RX ADMIN — SILDENAFIL 20 MG: 20 TABLET, FILM COATED ORAL at 10:39

## 2024-05-23 RX ADMIN — PANTOPRAZOLE SODIUM 40 MG: 40 TABLET, DELAYED RELEASE ORAL at 10:39

## 2024-05-23 RX ADMIN — ENOXAPARIN SODIUM 40 MG: 40 INJECTION SUBCUTANEOUS at 18:05

## 2024-05-23 RX ADMIN — ACETAMINOPHEN 650 MG: 325 TABLET ORAL at 03:05

## 2024-05-23 RX ADMIN — ATORVASTATIN CALCIUM 40 MG: 40 TABLET, FILM COATED ORAL at 14:36

## 2024-05-23 RX ADMIN — COLCHICINE 0.3 MG: 0.6 TABLET ORAL at 10:38

## 2024-05-23 RX ADMIN — CEFEPIME 2 G: 2 INJECTION, POWDER, FOR SOLUTION INTRAVENOUS at 05:32

## 2024-05-23 RX ADMIN — METHYLPREDNISOLONE SODIUM SUCCINATE 50 MG: 125 INJECTION, POWDER, FOR SOLUTION INTRAMUSCULAR; INTRAVENOUS at 10:38

## 2024-05-23 RX ADMIN — SALINE NASAL SPRAY 2 SPRAY: 1.5 SOLUTION NASAL at 20:40

## 2024-05-23 RX ADMIN — AMLODIPINE BESYLATE 10 MG: 10 TABLET ORAL at 10:38

## 2024-05-23 RX ADMIN — BENZONATATE 200 MG: 100 CAPSULE ORAL at 20:45

## 2024-05-23 RX ADMIN — ACETAMINOPHEN 650 MG: 325 TABLET ORAL at 23:24

## 2024-05-23 RX ADMIN — ACETAMINOPHEN 650 MG: 325 TABLET ORAL at 10:40

## 2024-05-23 RX ADMIN — ACETAMINOPHEN 650 MG: 325 TABLET ORAL at 16:10

## 2024-05-23 ASSESSMENT — COGNITIVE AND FUNCTIONAL STATUS - GENERAL
CLIMB 3 TO 5 STEPS WITH RAILING: 3 - A LITTLE
MOVING TO AND FROM BED TO CHAIR: 4 - NONE
AFFECT: WFL
MOVING TO AND FROM BED TO CHAIR: 3 - A LITTLE
CLIMB 3 TO 5 STEPS WITH RAILING: 3 - A LITTLE
STANDING UP FROM CHAIR USING ARMS: 3 - A LITTLE
WALKING IN HOSPITAL ROOM: 3 - A LITTLE
STANDING UP FROM CHAIR USING ARMS: 3 - A LITTLE
WALKING IN HOSPITAL ROOM: 3 - A LITTLE
WALKING IN HOSPITAL ROOM: 4 - NONE
STANDING UP FROM CHAIR USING ARMS: 4 - NONE
CLIMB 3 TO 5 STEPS WITH RAILING: 3 - A LITTLE
MOVING TO AND FROM BED TO CHAIR: 4 - NONE

## 2024-05-23 NOTE — PROGRESS NOTES
Physical Therapy -  Initial Evaluation     Patient: Arielle GUAJARDO Isidro  Location: [facility]  MRN: [id number]  Today's date: 5/23/2024    HISTORY OF PRESENT ILLNESS     Arielle is a 63 y.o. female admitted on 5/21/2024 with Palpitations [R00.2]  SVT (supraventricular tachycardia) (CMS/HCC) [I47.10]  Pericardial effusion [I31.39]  Weakness [R53.1]  Hemoptysis [R04.2]  Chest pain, unspecified type [R07.9]. Principal problem is Chest pain, unspecified type.    Past Medical History  Arielle has a past medical history of De Quervain's tenosynovitis, Essential (primary) hypertension, Follicular carcinoma of thyroid (CMS/HCC), Gastro-esophageal reflux, Hand ulceration (CMS/HCC), Hyperlipidemia, unspecified, Interstitial lung disease (CMS/HCC), Leg ulcer (CMS/HCC), Lung nodule, Lupus (CMS/HCC), Osteomyelitis of hand (CMS/HCC), Osteoporosis, Pulmonary hypertension (CMS/HCC), Raynaud's disease, Reflux esophagitis, Scleroderma (CMS/HCC), and Screening mammogram for breast cancer (05/04/2021).    History of Present Illness   DX admitted w/ tachycardia/ SOB/ fatigue/ cough/ dizziness/ chest pain> acute hypoxic respiratory failure    Chest CT with no PE    Recent dc April 2024 on supplemental 02  PRIOR LEVEL OF FUNCTION AND LIVING ENVIRONMENT     Prior Level of Function      Flowsheet Row Most Recent Value   Dominant Hand right   Ambulation assistive equipment  [reports mostly furniture surfing]   Transferring independent   Toileting independent   Bathing assistive equipment and person   Dressing independent   Eating independent   IADLs assistive person   Driving/Transportation friends/family   Communication understands/communicates without difficulty   Assistive Device Currently Used at Home Reorts ambulatory without device (occasional furniture surfing), A as needed for ADLs. Wears 02 at night, reports wearing occasionally during the day or when she goes out. Has 24 hr caregivers.   Assistive  Device/Animal Currently Used at Home walker, front-wheeled, oxygen          Prior Living Environment      Flowsheet Row Most Recent Value   People in Home other (see comments)  [24 hr care]   Current Living Arrangements home   Home Accessibility stairs to enter home (Group), stairs within home (Group)   Living Environment Comment 2SH, 5 LARRY/ R or L rail avail, has BSC avail 1st flr but prefers using stairglide to get access to 2nd flr bed/ bath, tub combo equipped w/ grab bar and shower chr,  endorses has been A w/ spongebathing          VITALS AND PAIN     PT Vitals      Date/Time Pulse HR Source SpO2 Pt Activity O2 Therapy BP BP Location BP Method Pt Position Observations [facility]   05/23/24 0802 94 Monitor 94 % At rest None (Room air) 152/71 Left upper arm Automatic Lying --    05/23/24 0825 87 -- 91 % At rest None (Room air) 169/77 Left upper arm Automatic Sitting post mobility RM          PT Pain      Date/Time Pain Type Rating: Rest Rating: Activity [facility]   05/23/24 0802 Pain Assessment 0 - no pain 0 - no pain RM             Objective   OBJECTIVE     Start time:  0802  End time:  0828  Session Length: 26 min  Mode of Treatment: individual therapy, physical therapy    General Observations  Patient received reclined, in bed. She was no issues or concerns identified by nurse prior to session, agreeable to therapy. awake in bed    Precautions: fall, oxygen therapy device and L/min (received on RA)              PT Eval and Treat - 05/23/24 0802          Cognition    Orientation Status oriented x 4   off on date by 1    Affect/Mental Status WFL     Follows Commands WFL     Comment, Cognition pleasant and cooperative, able to express needs. Fair insight to deficits, requiring cues for rest breaks due to dyspnea.        Vision Assessment/Intervention    Vision Assessment corrective lenses for reading        Hearing Assessment    Hearing Status WFL        Sensory Assessment    Sensory Assessment sensation intact except      Sensation Impaired Location(s) right LE;left LE     Sensory Subjective Reports numbness   B feet, chronic       Lower Extremity Assessment    LE Assessment ROM and Strength WFL     General Observations multiple UE digit amputations        Bed Mobility    Bed Mobility Activities supine to sit;left     [place] supervision     Safety/Cues verbal cues     Assistive Device bed rails;head of bed elevated     Comment Transition to pt's L with good pace of movement/ease. Denies dizziness        Mobility Belt    Mobility Belt Used for All Out of Bed Activity yes        Sit/Stand Transfer    Surface edge of bed   toilet, standard height chair    [place] supervision;1 person assist     Safety/Cues verbal cues     Assistive Device none     Transfer Comments stands from EOB and toilet with reliance on UE support to rise, demos good eccentric control.        Gait Training    [place], Gait close supervision;1 person assist     Assistive Device none     Distance in Feet 30 feet   x2    Pattern step-through     Deviations/Abnormal Patterns base of support, narrow     Comment (Gait/Stairs) Pt ambulates to/from bathroom with reciprocal gait pattern and very occasional light touch on surroundings.  Good upright posture and stability.        Stairs Training    Comment pt defers this session due to fatigue, demonstrates good functional strength and discussed that pt will have assist from caregiver for home entry, uses stairglide in home        Balance    Static Sitting Balance WFL;sitting in chair     Dynamic Sitting Balance WFL;sitting in chair     Static Standing Balance WFL;supported;standing     Dynamic Standing Balance mild impairment;unsupported;standing     Comment, Balance no LOB without device. pt completes several minutes standing hygiene care with supervision becoming increasingly short of breath. Pt wants to stand to don underwear and educated on sitting for increased safety.        Impairments/Safety Issues     Impairments Affecting Function endurance/activity tolerance;shortness of breath;balance     Functional Endurance Mod dypsnea with activity on RA, satting 89% when spot checked after several minutes standing activity in bathroom. Benefits from seated rest breaks between tasks.     Safety Issues Affecting Function judgment;insight into deficits/self-awareness     Comment, Safety Issues/Impairments benefits from prompting for seated rest break                                    Education Documentation  Joint Mobility/Strength, taught by Catherine Munoz, PT at 5/23/2024  8:09 AM.  Learner: Patient  Readiness: Acceptance  Method: Explanation  Response: Verbalizes Understanding  Comment: role of IPPT, safe mobility techniques, POC        Session Outcome  Patient upright, in chair at end of session, all needs met, personal items in reach, call light in reach, chair alarm on. Nursing notified about change in vital signs, patient's performance, patient's position, and patient's response to therapy/activity.    AM-PAC™ - Mobility (Current Function)     Turning form your back to your side while in flat bed without using bedrails 3 - A Little   Moving from lying on your back to sitting on the side of a flat bed without using bedrails 3 - A Little   Moving to and from a bed to a chair 3 - A Little   Standing up from a chair using your arms 3 - A Little   To walk in a hospital room 3 - A Little   Climbing 3-5 steps with a railing 3 - A Little   AM-PAC™ Mobility Score 18      ASSESSMENT AND PLAN     Progress Summary  Pt seen for PT eval and ambulates without device with supervision. Limited by moderate dyspnea with activity on RA; pt reports typically wearing 2L 02 at night. Rec PT to follow to maximize activity tolerance, balance, and complete stair training for safe home entry. Rec home with resumption of 24 hr caregivers and home PT in order to achieve max LOF.         PT Plan      Flowsheet Row Most Recent Value   Rehab  Potential good, to achieve stated therapy goals at 05/23/2024 0802   Therapy Frequency 5 times/wk at 05/23/2024 0802   Planned Therapy Interventions balance training, bed mobility training, home exercise program, gait training, patient/family education, stair training, transfer training, strengthening at 05/23/2024 0802            PT Discharge Recommendations      Flowsheet Row Most Recent Value   PT Recommended Discharge Disposition home with assistance, home with home health at 05/23/2024 0802   Anticipated Equipment Needs if Discharged Home (PT) none  [has RW] at 05/23/2024 0802                 PT Goals      Flowsheet Row Most Recent Value   Bed Mobility Goal 1    Activity/Assistive Device bed mobility activities, all at 05/23/2024 0802   [place] modified independence at 05/23/2024 0802   Time Frame short-term goal (STG) at 05/23/2024 0802   Progress/Outcome goal ongoing at 05/23/2024 0802   Transfer Goal 1    Activity/Assistive Device all transfers at 05/23/2024 0802   [place] modified independence at 05/23/2024 0802   Time Frame short-term goal (STG) at 05/23/2024 0802   Progress/Outcome goal ongoing at 05/23/2024 0802   Gait Training Goal 1    Activity/Assistive Device gait (walking locomotion) at 05/23/2024 0802   [place] modified independence at 05/23/2024 0802   Distance 200 at 05/23/2024 0802   Time Frame short-term goal (STG) at 05/23/2024 0802   Progress/Outcome good progress toward goal at 05/23/2024 0802   Stairs Goal 1    Activity/Assistive Device stairs, all skills at 05/23/2024 0802   [place] supervision required at 05/23/2024 0802   Number of Stairs 6 at 05/23/2024 0802   Time Frame short-term goal (STG) at 05/23/2024 0802   Progress/Outcome goal ongoing at 05/23/2024 0802

## 2024-05-23 NOTE — PLAN OF CARE
Problem: Adult Inpatient Plan of Care  Goal: Plan of Care Review  Outcome: Progressing  Flowsheets (Taken 5/23/2024 7809)  Progress: improving  Outcome Evaluation:   PT eval completed   rec home with support and home PT  Plan of Care Reviewed With: patient     Problem: Mobility Impairment  Goal: Optimal Mobility  Outcome: Progressing

## 2024-05-23 NOTE — PROGRESS NOTES
"   Pulmonary Progress Note       SUBJECTIVE   Patient seen and examined at bedside. No acute events overnight.     Feels slightly improved today from a respiratory standpoint.  91-93% on RA.      OBJECTIVE        Vital Signs:   Visit Vitals  /65 (BP Location: Left upper arm, Patient Position: Sitting)   Pulse 86   Temp 36.4 °C (97.6 °F) (Oral)   Resp 16   Ht 1.676 m (5' 5.98\")   Wt 52.4 kg (115 lb 9.6 oz)   LMP  (LMP Unknown)   SpO2 93%   BMI 18.67 kg/m²       I/O's:    Intake/Output Summary (Last 24 hours) at 5/23/2024 1418  Last data filed at 5/23/2024 0635  Gross per 24 hour   Intake 210 ml   Output --   Net 210 ml       Medications:    Reviewed. Pertinent medications as below.     amLODIPine  10 mg oral q AM    atorvastatin  40 mg oral Daily (6p)    cholecalciferol (vitamin D3)  1,000 Units oral Daily    colchicine  0.3 mg oral Daily    cyanocobalamin  1,000 mcg oral Daily    enoxaparin  40 mg subcutaneous Daily (6p)    ipratropium-albuteroL  3 mL nebulization q6h SPENCER    levothyroxine  100 mcg oral Daily (6:30a)    macitentan  10 mg oral Daily    methylPREDNISolone sodium succinate  50 mg intravenous Daily    pantoprazole  40 mg oral Daily    sildenafiL (pulm.hypertension)  20 mg oral TID           PHYSICAL EXAMINATION        General: The patient is in no acute distress.    HEENT: Mucous membranes are moist.  Sclera are anicteric.Skin thickening around face and mouth  Neck: Supple.  No cervical lymphadenopathy  Cardiovascular: S1 and S2 are present.  There are no murmurs.  Lungs: Clear to auscultation bilaterally without wheezes or rhonchi  Abdomen: Soft, nontender and nondistended  Extremities: Cool and dry without edema, missing digits/changes consistent with scleroderma   Neuro: Awake and alert.    Diagnostic Data      Labs:    I have personally reviewed all pertinent patient laboratory results. Labs of note discussed below:    ABG Results         04/13/23 1949    Source Of Oxygen nc          CMP " Results         05/23/24 05/22/24 05/21/24     0448 0440 1335     140 138    K 4.3 4.6 5.3    Cl 105 107 105    CO2 23 21 23    Glucose 67 129 135    BUN 34 22 20    Creatinine 1.1 1.0 1.0    Calcium 8.7 8.8 9.5    Anion Gap 10 12 10    EGFR 56.6 >60.0 >60.0           Comment for NA at 1335 on 05/21/24: Moderate hemolysis, results may be affected.     Comment for K at 1335 on 05/21/24: Results obtained on plasma. Plasma Potassium values may be up to 0.4 mEQ/L less than serum values. The differences may be greater for patients with high platelet or white cell counts.  Moderate hemolysis, results may be affected.     Comment for EGFR at 0448 on 05/23/24: Calculation based on the Chronic Kidney Disease Epidemiology Collaboration (CKD-EPI) equation refit without adjustment for race.    Comment for EGFR at 0440 on 05/22/24: Calculation based on the Chronic Kidney Disease Epidemiology Collaboration (CKD-EPI) equation refit without adjustment for race.    Comment for EGFR at 1335 on 05/21/24: Calculation based on the Chronic Kidney Disease Epidemiology Collaboration (CKD-EPI) equation refit without adjustment for race.          CBC Results         05/23/24 05/22/24 05/21/24     0448 0440 1335    WBC 15.09 7.50 11.76    RBC 3.53 3.69 4.43    HGB 9.6 10.0 12.0    HCT 31.9 32.8 40.2    MCV 90.4 88.9 90.7    MCH 27.2 27.1 27.1    MCHC 30.1 30.5 29.9     253 289          Microbiology Results       Procedure Component Value Units Date/Time    Sputum culture / smear Expectorated Sputum [[id number]] Collected: 05/22/24 1251    Specimen: Expectorated Sputum Updated: 05/22/24 1130    Narrative:      The following orders were created for panel order Sputum culture / smear Expectorated Sputum.  Procedure                               Abnormality         Status                     ---------                               -----------         ------                     Sputum Gram Stain (Lab O...[[id number]]                       Final result                 Please view results for these tests on the individual orders.    Sputum Gram Stain (Lab Only) Expectorated Sputum [[id number]] Collected: 05/22/24 1251    Specimen: Expectorated Sputum Updated: 05/22/24 1758     Gram Stain Result No WBC Seen      1+ Epithelial cells      1+ Gram positive cocci in pairs and chains      1+ Budding yeast      Oropharyngeal Contamination    MRSA Screen, Nares Only Nose [[id number]]  (Normal) Collected: 05/22/24 0510    Specimen: Nasal Swab from Nose Updated: 05/22/24 0909     MRSA DNA, Nares Negative    Blood Culture Blood, Venous [[id number]]  (Normal) Collected: 05/22/24 0440    Specimen: Blood, Venous Updated: 05/23/24 0601     Culture No growth at 18-24 hours    Blood Culture Blood, Venous [[id number]]  (Normal) Collected: 05/22/24 0440    Specimen: Blood, Venous Updated: 05/23/24 0601     Culture No growth at 18-24 hours    Urine culture Urine, Clean Catch [[id number]]  (Abnormal) Collected: 05/21/24 1506    Specimen: Urine, Clean Catch Updated: 05/22/24 1303     Urine Culture **Positive Culture**      1,000-4,000 cfu/mL Gram Negative Rods    Narrative:      Please call Microbiology if clinical considerations warrant further testing. Isolate will be held for 7 days.            Imaging:    I have reviewed all pertinent imaging which is discussed below:    No results found.      ASSESSMENT AND PLAN        62 y.o. 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    Chronic hypoxemic respiratory failure.  Cutaneous systemic scleroderma with subsequent ILD and Group 1 pulmonary hypertension  Acute on chronic HFpEF  Recent pericarditis with  subsequent pericardial effusion   Left lower lobe nodule, enlarged over the past year.  Previous PET scan with an SUV of 4.1.  Multiple lung nodules, new perifissural left lower lobe nodules.      Dyspnea is likely multifactorial. She is mildly volume overloaded in the setting of known ILD, pulmonary HTN, pericardial effusion and severe deconditioning. Less likely ILD flare or infectious etiology. CRP 6.50. ESR 79 (from 101)    TTE 5/23 with mild increased wall thickness with normal left ventricular cavity and preserved systolic function. EF 65%. No wall motion abnormalities. Normal right ventricular size with preserved systolic function. Persistent small pericardial effusion    Plan:  -Recommend gentle diuresis as BP and Cr tolerate  -Can decrease to oral prednisone 40 mg tomorrow and decrease by 10 mg every 3 days until down to 10 mg daily with close outpatient follow up with Dr. Martinez and Dr. Wasserman  -Needs an outpatient repeat PET CT scan for evaluation of left lower lobe nodules  -please give second dose of tocilizumab (scheduled for today 5/23) if possible  -Check ambulatory pulse ox  -Rest per primary team    Scheduled to see Dr. Wasserman and Dr. Martinez on Tuesday, June 4th at 9:40 am    Please page [phone] with any questions/concerns.         Haley Knight MD  Pulmonary & Critical Care Medicine Fellow  PGY-5  5/23/2024

## 2024-05-23 NOTE — PLAN OF CARE
Care Coordination Discharge Plan Note     Discharge Needs Assessment  Concerns to be Addressed: discharge planning, care coordination/care conferences  Current Discharge Risk:      Anticipated Discharge Plan  Anticipated Discharge Disposition: home with home health  Type of Home Care Services: home OT, home PT, nursing      Patient Choice  Offered/Gave Vendor List: yes  Patient's Choice of Community Agency(s): [facility]     Patient and/or patient guardian/advocate was made aware of their right to choose a provider. A list of eligible providers was presented and reviewed with the patient and/or patient guardian/advocate in written and/or verbal form. The list delineates providers in the patient’s desired geographic area who are participating in the Medicare program and/or providers contracted with the patient’s primary insurance. The Medicare list and quality ratings were obtained from the Medicare.gov [medicare.gov] website.    ---------------------------------------------------------------------------------------------------------------------    Interdisciplinary Discharge Plan Review:  Participants:     Concerns Comments: Per rounds, pt is likely stable for dc tomorrow vs Saturday. [facility]  is following, updated on [facility]. [id number]    Discharge Plan:   Disposition/Destination:   /    Discharge Facility:    Community Resources:      Discharge Transportation:  Is Out of Hospital DNR needed at Discharge: no  Does patient need discharge transport?

## 2024-05-23 NOTE — PROGRESS NOTES
Patient: Arielle GUAJARDO Isidro  Location: [address]  MRN:  [id number]  Today's date:  5/23/2024    Attempted to see patient for therapy. Unable due to medical hold (Per discussion with RN, pt c/o chest pain; team to evaluate. will hold OT at this time.).

## 2024-05-23 NOTE — PROGRESS NOTES
Internal Medicine  Daily Progress Note       SUBJECTIVE   This is a 63 y.o. year-old female admitted on 2024 with Palpitations [R00.2]  SVT (supraventricular tachycardia) (CMS/HCC) [I47.10]  Pericardial effusion [I31.39]  Weakness [R53.1]  Hemoptysis [R04.2]  Chest pain, unspecified type [R07.9].    Interval History: No acute overnight events.Patient was seen and examined this morning with the team. Just prior to entering the room, she began to experience acute left sided chest pain that was tender to palpation. She was not short of breath, dizzy, diaphoretic or nauseous. EKG normal sinus and without new abnormalities.    She is otherwise doing well. Her breathing continues to improve daily and she denies coughing. She shares that she still has not found anyone to collect her macitentan delivery from her front door. We are continuing to search for contacts that can help. She denies headache, fever/chills, abdominal pain, NVD.     OBJECTIVE      Vital signs in last 24 hours:  Temp:  [36.4 °C (97.6 °F)-36.8 °C (98.2 °F)] 36.4 °C (97.6 °F)  Heart Rate:  [] 99  Resp:  [16-18] 16  BP: (130-169)/(57-77) 139/65  Temp (24hrs), Av.6 °C (97.8 °F), Min:36.4 °C (97.6 °F), Max:36.8 °C (98.2 °F)    Intake/Output       Intake Evening 24 - 24 Night 24 - 24 0659 Day 24 - 24 1459 Output Evening 24 - 24 Night 24 - 24 Day 24 - 24 1459    I.V. 10 -- --                    IV Piggyback 100 100 --                    Total 110 100 -- Total -- -- --   Last 3 shifts --  Intake: 210       Output: 0       Net: 210         PHYSICAL EXAMINATION      Physical Exam  Constitutional:       General: She is in acute distress.      Appearance: She is ill-appearing.   HENT:      Head: Normocephalic and atraumatic.   Eyes:      General: No scleral icterus.     Conjunctiva/sclera: Conjunctivae normal.   Cardiovascular:       Rate and Rhythm: Normal rate and regular rhythm.      Pulses: Normal pulses.      Heart sounds: Normal heart sounds.   Pulmonary:      Effort: Pulmonary effort is normal.      Breath sounds: Normal breath sounds.   Abdominal:      General: Abdomen is flat. Bowel sounds are normal. There is no distension.      Palpations: Abdomen is soft.      Tenderness: There is no abdominal tenderness. There is no guarding.   Musculoskeletal:         General: No swelling.      Cervical back: Normal range of motion and neck supple.      Right lower leg: No edema.      Left lower leg: No edema.   Skin:     General: Skin is warm and dry.      Capillary Refill: Capillary refill takes less than 2 seconds.      Coloration: Skin is not jaundiced.      Comments: Diffuse skin tightening on both hands with autoamputation of multiple distal phalanxes  Ulcerations most notable in the right second toe and the left hallux (please see media)     Neurological:      Mental Status: She is alert and oriented to person, place, and time.          LINES, CATHETERS, DRAINS, AIRWAYS, AND WOUNDS   Lines, Drains, Airways, Wounds:  Peripheral IV (Adult) 05/23/24 Anterior;Left Forearm (Active)   Number of days: 0       Comments:      LABS / IMAGING / TELE      Labs  I have reviewed the patient's labs to the time of note. No new clinical concern.    Results from last 7 days   Lab Units 05/23/24  0448 05/22/24  0440 05/21/24  1335   SODIUM mEQ/L 138 140 138   POTASSIUM mEQ/L 4.3 4.6 5.3*   CHLORIDE mEQ/L 105 107 105   CO2 mEQ/L 23 21 23   BUN mg/dL 34* 22 20   CREATININE mg/dL 1.1 1.0 1.0   CALCIUM mg/dL 8.7 8.8 9.5   GLUCOSE mg/dL 67* 129* 135*      Results from last 7 days   Lab Units 05/23/24  0448 05/22/24  0440 05/21/24  1335   WBC K/uL 15.09* 7.50 11.76*   HEMOGLOBIN g/dL 9.6* 10.0* 12.0   HEMATOCRIT % 31.9* 32.8* 40.2   PLATELETS K/uL 271 285 783      Imaging personally reviewed(does not include unread studies):  CT ANGIOGRAPHY CHEST PULMONARY  EMBOLISM WITH IV CONTRAST    Result Date: 5/22/2024  IMPRESSION: 1.  No evidence of acute pulmonary embolism. 2.  Multiple new perifissural left lower lobe nodules, suspicious for a neoplastic process. PET/CT and or tissue sampling is recommended. 3.  Chronic interstitial lung disease in an NSIP pattern, with overall slightly progressed appearance since March 2023. Pulmonary consultation is recommended. 4.  Stable cardiomegaly and large pericardial effusion. 5.  Mediastinal and supraclavicular adenopathy, similar to prior study, also indeterminate, which could be further evaluated at time of PET/CT. A preliminary report these findings provided by Agatha Ramos DO, May 21, 2024 10:45 PM In accordance with [place] Act 112,  the patient will receive a letter notifying them to follow up with their physician.    X-RAY CHEST 1 VIEW    Result Date: 5/21/2024  IMPRESSION: 1. Stable enlargement of the cardiac silhouette, in keeping with cardiomegaly and pericardial effusion noted on recent CT scan. 2. Diffuse hazy airspace opacity and interstitial prominence, suspicious for pulmonary edema. Other interstitial and airspace diseases would also have to be considered in the appropriate clinical situation. 3. Questioned vague 1.3 cm left upper lobe nodular opacity.     ECG/Telemetry  I have independently reviewed the telemetry. No events for the last 24 hours., I have independently reviewed the ECG. No significant findings.    ASSESSMENT AND PLAN      Acute hypoxic respiratory failure (CMS/HCC)  Assessment & Plan  Presented with progressively worsening shortness of breath over the last 3 days.  Patient was recently discharged from [facility] on home oxygen.  Currently on 2 L in the emergency department and is saturating appropriately.  Known history of pericardial effusion last seen on transthoracic echo dated 4/10/2024.  POCUS in the emergency department showed evidence of pericardial effusion with no tamponade  physiology  Patient also has a history of interstitial lung disease in the context of scleroderma  CT pulm angiogram shows no evidence of pulm embolism however there is evidence of bilateral changes concerning for volume overload  Patient did have rigors but no fevers.  Also has a cough that is productive of some sputum that is on occasion blood-tinged.    impression: Shortness of breath is most likely multifactorial including interstitial lung disease flare, pericardial effusion (which looks smaller on the study dated 5/21/2024), possibly some pulmonary edema secondary to volume overload in the context of pulmonary hypertension/hypertensive emergency as her BP was raised at 211/94 upon arrival.  Her cough may also be suggestive of a viral/bacterial etiology however this is less likely per the clinical picture and imaging. Patient is also at risk for PJP in light of immunocompromised state zoila with recent steroid course. Dyspnea related to tocilizumab is rare and unlikely in this case as the last dose was several weeks ago.     Plan:  -Continue home regimen macitentan and sildenafil  -IV methylprednisolone 40 mg daily (decrease by 10 mg every 3 days until down to 10 mg daily)  -PRN furosemide 20 mg  -Discontinued antibiotics  -f/u sputum for PJP PCR, fungal staining/culture, RPP  -determine if candidate for PJP prophylaxis, per ID    Interstitial lung disease (CMS/HCC)  Assessment & Plan  Patient has a history of interstitial lung disease in the context of scleroderma  Recently started on tocilizumab with Dr. Trevizo her rheumatologist  Patient stated that her next dose will be due on Thursday, 5/23/2024    Impression: It is unclear if the patient is currently experiencing shortness of breath secondary to a flare of her interstitial lung disease.  Shortness of breath is likely multifactorial including interstitial lung disease, pericardial effusion, and pulmonary edema (volume overload)    Plan:  -Continue  tocilizumab   -IV methylprednisolone 40 mg daily (decrease by 10 mg every 3 days until down to 10 mg daily)  -outpatient follow up with Dr. Martinez and Dr. Wasserman     Pericardial effusion  Assessment & Plan  Ddx: autoimmune vs. Malignant versus other  Patient does have a history of scleroderma.  Pericardial effusion was seen on prior admissions.    With tachycardia and worsening shortness of breath.  POCUS performed by the emergency staff showed no evidence of tamponade physiology   S/p Methylprednisolone 125 mg in ED   CT shows mild pericardial effusion. When compared to previous CT PA dated April, pericaldial effusion looks slightly smaller on the study done in this visit.   TTE (5/23) shows small pericardial effusion with no evidence of hemodynamic compromise with normal respiratory variation and no chamber collapse. Tiny IVC with >50% respiratory variation consistent with low-normal right sided filling pressure.   Lab Results   Component Value Date     (H) 05/22/2024    HSTROPONINI 24.1 (H) 05/21/2024    HSTROPONINI 32.2 (H) 05/21/2024     QuantiFERON dated 2/28/2024 was negative    Plan:  -Continue colchicine at 0.3 mg once daily  -PRN furosemide 20 mg    Multiple open wounds of lower extremity  Assessment & Plan  Patient has multiple ulcers in her lower most notably on the second toe of right foot as well as the hallux of her left foot  There is also an ulcer on the medial aspect of her right ankle  None of these have any surrounding erythema, oozing or tenderness to palpation      Plan:  -Wound care  -f/u podiatry consult    CAD (coronary artery disease)  Assessment & Plan  Patient has evidence of coronary artery calcification on CT pulm angiogram  Patient was suggested to start on Plavix however was not agreeable to same  Otherwise, patient is on statin therapy    Plan:  -Continue home regimen atorvastatin 40 mg once daily    Lung mass  Assessment & Plan  CT scan showed lung mass 16 mm in size in  the left lower lobe in her admission in April.  This was also seen again in her CT pulmonary angiogram and this admission.    Patient does have a history of tobacco use disorder    Plan:  -Patient would benefit from outpatient PET/CT for further evaluation.  Patient is aware of same and will make arrangements for this upon her discharge    Debility  Assessment & Plan  Patient is very cachectic on clinical examination  Has 24 hours/day supportive care at home.  Is able to walk following with difficulty.  Uses assisting device for ambulation.  Wt Readings from Last 5 Encounters:   05/21/24 52.6 kg (116 lb)   04/17/24 53.5 kg (118 lb)   04/10/24 44.5 kg (98 lb)   03/16/24 44.6 kg (98 lb 5.2 oz)   02/01/24 47.6 kg (105 lb)     Weight was down to approximately 90 pounds a few months ago however was recently started on steroids and stated that her appetite has since increased.  Currently weighs 116 pounds however I believe that this may also be attributed at least in part to some    Impression: Likely severe debility secondary to chronic underlying disease affecting the patient's breathing and functional status overall.  Possibly underlying malignancy in the context of the patient's history of tobacco use disorder as well as lung nodule seen on CT scan.    Plan:  -Dietitian  -PT/OT   -f/u outpatient PET/CT to further characterize lung nodule  -palliative c/s    Scleroderma (CMS/HCC)  Assessment & Plan  Known history of scleroderma with associated interstitial lung disease.  Known to Dr. Trevizo (rheumatology)  On home regimen tocilizumab with next dose scheduled for Thursday    Plan:  -Continue tocilizumab  -f/u CRP/ESR    Pulmonary hypertension (CMS/HCC)  Assessment & Plan  Patient with history of WHO group 1 pulm hypertension  On home regimen macitentan and sildenafil  Known to Dr. Timothy Arvizu from last admission at [facility]  Last echo dated 4/10/2024 showed LVEF 60%.  Estimated RVSP 51 mm Hg    Physical examination  consistent with minimal amounts of volume overload with abdominal distention.  Lab Results   Component Value Date     (H) 05/22/2024     Plan:  -Continue home regimen sildenafil and macitentan  -appreciate pulmonology and cardiology rec's         VTE Assessment: Padua    VTE Prophylaxis: Current anticoagulants:  enoxaparin (LOVENOX) syringe 40 mg, subcutaneous, Daily (6p)      Code Status: Full Code  Estimated discharge date: 5/25/2024     ATTENDING DOCUMENTATION  ALSO SEE ATTENDING ATTESTATION SECTION OF NOTE

## 2024-05-23 NOTE — NURSING NOTE
Pt transferred to 3SW via wheelchair for infusion. All belongings went with pt, including personal oxygen tank.

## 2024-05-23 NOTE — PLAN OF CARE
Plan of Care Review  Plan of Care Reviewed With: patient  Progress: no change  Outcome Evaluation: Pt AAox4, calm and cooperative w/ care. VSS. Meds given as prescribed, prn tylenol given x1. pt standby asst to toilet. Call bell within reach, pt sleeping in between care. Bed alarm on

## 2024-05-23 NOTE — NURSING NOTE
Pt ordered to receive Actemra. Pt states she received her last dose approx 3-4 weeks ago and did not have any adverse side effects during the infusion. Pt's VSS. LFA IV site asymptomatic and flushed with ease. Actemra given as ordered. Pt monitored for s/sx of hypersensitivity reaction, VSS throughout infusion and pt without any complaints.

## 2024-05-23 NOTE — CONSULTS
Podiatry Consult Note    Subjective     Arielle Felix is a 63 y.o. female who was admitted for Palpitations [R00.2]  SVT (supraventricular tachycardia) (CMS/HCC) [I47.10]  Pericardial effusion [I31.39]  Weakness [R53.1]  Hemoptysis [R04.2]  Chest pain, unspecified type [R07.9]. Patient was referred by primary team for management recommendations. Patient with PMH of scleroderma, Raynaud's disease, ILD, CAD who presents with BLE wounds.  Patient states she has had the wounds to her ankles for about half a year now, but the wounds to her digits developed more recently.  She states she was seeing a podiatrist affiliated with [facility] who instructed her to apply cream to the wounds.  Patient is unsure with the cream was called, but patient stopped going to that particular podiatrist as she felt it was not helpful.  She also states she has seen a wound care doctor here at [facility] in the past, believed to be Dr. Mensah, and is interested in potentially following with them at [facility].  Additionally, she is hoping to have her nails debrided during this admission.  She denies any pain to any of the wounds at this time.    Outside records reviewed..    Medical History:   Past Medical History:   Diagnosis Date    De Quervain's tenosynovitis     Essential (primary) hypertension     Follicular carcinoma of thyroid (CMS/HCC)     Gastro-esophageal reflux     Hand ulceration (CMS/HCC)     Hyperlipidemia, unspecified     Interstitial lung disease (CMS/HCC)     Leg ulcer (CMS/HCC)     Lung nodule     Lupus (CMS/HCC)     Osteomyelitis of hand (CMS/HCC)     Osteoporosis     Pulmonary hypertension (CMS/HCC)     Raynaud's disease     Severe    Reflux esophagitis     Scleroderma (CMS/HCC)     Screening mammogram for breast cancer 05/04/2021    BI-RADS category: 1 - Negative (care everywhere @ [place])       Surgical History:   Past Surgical History:   Procedure Laterality Date    CARDIAC CATHETERIZATION      COLONOSCOPY      HERNIA  REPAIR         Social History:   Social History     Social History Narrative    Not on file       Family History:   Family History   Problem Relation Age of Onset    Heart disease Biological Brother         stent    Breast cancer Niece     Cervical cancer Neg Hx     Uterine cancer Neg Hx     Colon cancer Neg Hx     Ovarian cancer Neg Hx        Allergies: Aspirin, Ibuprofen, Iodine, Iodine and iodide containing products, Penicillins, Sulfa (sulfonamide antibiotics), Clindamycin, Hydrochlorothiazide, Oxycodone, Sulfamethoxazole-trimethoprim, and Latex    Home Medications:    amLODIPine, Take 10 mg by mouth every morning.    atorvastatin, Take 1 tablet (40 mg total) by mouth daily.    biotin, Take 1,000 mcg by mouth daily.    cholecalciferol (vitamin D3), Take 1,000 Units by mouth daily.    colchicine, Take 0.5 tablets (0.3 mg total) by mouth daily.    cyanocobalamin (vitamin B-12), Take 1,000 mcg by mouth daily.    docusate sodium, Take 100 mg by mouth daily as needed for constipation.    levothyroxine, Take 100 mcg by mouth daily.    macitentan, Take 1 tablet (10 mg total) by mouth daily.    meclizine, Take 25 mg by mouth daily as needed.    mupirocin, Apply 1 application. topically daily. Behind ears    pantoprazole, Take 1 tablet (40 mg total) by mouth daily Indications: stress ulcer prevention.    predniSONE, Take 4 tabs (20 mg) by mouth for 2 weeks, then 3 tabs (15 mg) by mouth for 2 weeks, then 2 tabs (10mg) thereafter. Continue 10mg daily until you follow up with Rheumatology.    sildenafiL (pulm.hypertension), Take 1 tablet (20 mg total) by mouth 3 (three) times a day.    ACTEMRA, Infuse 8 mg/kg into a venous catheter every 28 (twentyeight) days.    Current Medications:    acetaminophen, 650 mg, oral, q6h PRN    amLODIPine, 10 mg, oral, q AM    atorvastatin, 40 mg, oral, Daily (6p)    benzonatate, 200 mg, oral, 3x daily PRN    cholecalciferol (vitamin D3), 1,000 Units, oral, Daily    colchicine, 0.3 mg, oral,  Daily    cyanocobalamin, 1,000 mcg, oral, Daily    glucose, 16-32 g of dextrose, oral, PRN **OR** dextrose, 15-30 g of dextrose, oral, PRN **OR** glucagon, 1 mg, intramuscular, PRN **OR** dextrose 50 % in water (D50), 25 mL, intravenous, PRN    docusate sodium, 100 mg, oral, Daily PRN    enoxaparin, 40 mg, subcutaneous, Daily (6p)    honey, , Topical, Daily    ipratropium-albuteroL, 3 mL, nebulization, q6h SPENCER    levothyroxine, 100 mcg, oral, Daily (6:30a)    macitentan, 10 mg, oral, Daily    meclizine, 25 mg, oral, Daily PRN    pantoprazole, 40 mg, oral, Daily    [START ON 5/30/2024] predniSONE, 20 mg, oral, Daily    [START ON 5/27/2024] predniSONE, 30 mg, oral, Daily    [START ON 5/24/2024] predniSONE, 40 mg, oral, Daily    sildenafiL (pulm.hypertension), 20 mg, oral, TID    tocilizumab (ACTEMRA) 200 mg in sodium chloride 0.9 % 100 mL IVPB, 200 mg, intravenous, Once    sodium chloride 0.9 % parenteral solution 79 mL with tocilizumab 400 mg/20 mL (20 mg/mL) solution 420 mg    Labs  CBC Results         05/23/24 05/22/24 05/21/24     0448 0440 1335    WBC 15.09 7.50 11.76    RBC 3.53 3.69 4.43    HGB 9.6 10.0 12.0    HCT 31.9 32.8 40.2    MCV 90.4 88.9 90.7    MCH 27.2 27.1 27.1    MCHC 30.1 30.5 29.9     253 289          BMP Results         05/23/24 05/22/24 05/21/24     0448 0440 1335     140 138    K 4.3 4.6 5.3    Cl 105 107 105    CO2 23 21 23    Glucose 67 129 135    BUN 34 22 20    Creatinine 1.1 1.0 1.0    Calcium 8.7 8.8 9.5    Anion Gap 10 12 10    EGFR 56.6 >60.0 >60.0           Comment for NA at 1335 on 05/21/24: Moderate hemolysis, results may be affected.     Comment for K at 1335 on 05/21/24: Results obtained on plasma. Plasma Potassium values may be up to 0.4 mEQ/L less than serum values. The differences may be greater for patients with high platelet or white cell counts.  Moderate hemolysis, results may be affected.     Comment for EGFR at 0448 on 05/23/24: Calculation based on the  "Chronic Kidney Disease Epidemiology Collaboration (CKD-EPI) equation refit without adjustment for race.    Comment for EGFR at 0440 on 05/22/24: Calculation based on the Chronic Kidney Disease Epidemiology Collaboration (CKD-EPI) equation refit without adjustment for race.    Comment for EGFR at 1335 on 05/21/24: Calculation based on the Chronic Kidney Disease Epidemiology Collaboration (CKD-EPI) equation refit without adjustment for race.              Imaging  Not applicable    Review of Systems  Pertinent items are noted in HPI.    Objective     Physicial Exam  Visit Vitals  BP (!) 142/67 (BP Location: Right upper arm, Patient Position: Lying)   Pulse 91   Temp 36.3 °C (97.4 °F) (Oral)   Resp 17   Ht 1.676 m (5' 5.98\")   Wt 52.4 kg (115 lb 9.6 oz)   LMP  (LMP Unknown)   SpO2 96%   BMI 18.67 kg/m²         General appearance: alert, appears stated age, cooperative, and ill appearing  Head: normocephalic, without obvious abnormality, atraumatic  Eyes: conjunctivae/corneas clear. PERRL, EOM's intact. Fundi benign.  Ears: normal TM's and external ear canals both ears  Nose: Nares normal. Septum midline. Mucosa normal. No drainage or sinus tenderness.    Physical Exam:  -Vascular: DP/PT pulses are faintly palpable bilaterally.  CRT to all digits is less than 3 seconds bilaterally. Temperature from leg to digits is cool and even bilaterally.  -Orthopedic: Gross motor function to all digits is intact. Range of motion to bilateral ankle joint is WNL.  Multiple digital contractures noted to lesser digits  -Neurologic: light touch and epicritic sensation is intact  -Dermatologic: Skin to BLE is thin, scaly, xerotic   RLE: Well adhered eschar noted to distal aspect of second digit.  No drainage noted.  No fluctuance or crepitus appreciated.  Small fibrotic wound noted medial aspect of ankle measuring approximate 1.0 cm x 1.0 cm.  Wound showing some signs of epithelialization.  Hyperpigmented, well-circumscribed lesion noted " to lateral aspect of plantar foot.  LLE: Fibrous lesion noted to medial aspect of hallux measuring approximately 2.0 cm x 2.0 cm.  No drainage noted.  No fluctuance or crepitus appreciated.  No malodor noted.  Hyperpigmented, well-circumscribed lesion noted to plantar aspect of fourth digit.      Assessment   Patient is a 63-year-old female with PMH of scleroderma, Raynaud's disease, ILD, CAD who presents with BLE wounds with low concern for infection        Plan   -Plan discussed with attending  -Patient was examined, evaluated, treated at bedside all questions and concerns addressed  -Medihoney ordered for future dressing changes  -Patient requesting that her wounds be open to air until medication is bedside  -Labs and radiographs reviewed.  -WBAT to BLE  -Rest of care per primary team  -Plan to return tomorrow to apply medication and debride nails  -Recommend that she follow-up with Dr. Null or Dr. Mensah at the [facility] wound center  -Thank you for this consult, please contact on call podiatry resident with any questions or concerns      Makayla Martínez PGY1  #[phone]

## 2024-05-23 NOTE — PROGRESS NOTES
Brief Progress Note     Called to update patient daughter, provided medical update regarding hospital course. Answered all questions. I asked her if someone would be able to  the Macitentan that was recently delivered to patient's house. Daughter states that she believes a neighbor picked up the medicine. I explained the importance of obtaining this medicine both from the standpoint of her health as they will be unable to get more medication and from a financial standpoint as this medication costs an exorbitant amount of money.     Patient's daughter informed me that she will check in with the neighbor and get back to use regarding the update on her macitentan.     Clarisa Lopez, DO

## 2024-05-23 NOTE — PROGRESS NOTES
"Infectious Disease Progress Note    Patient Name: Arielle Felix  MR#: [id number]  : 1960  Admission Date: 2024  Date: 24   Time: 11:15 AM   Author: Sebastián Haines MD        Antibiotics:        Subjective     Less short of breath.  Positive dry cough, no hemoptysis    Objective     Vital Signs:    Visit Vitals  BP (!) 153/74   Pulse 97   Temp 36.6 °C (97.9 °F) (Oral)   Resp 16   Ht 1.676 m (5' 5.98\")   Wt 52.4 kg (115 lb 9.6 oz)   LMP  (LMP Unknown)   SpO2 (!) 91%   BMI 18.67 kg/m²       Temp (24hrs), Av.6 °C (97.9 °F), Min:36.4 °C (97.6 °F), Max:36.8 °C (98.2 °F)    Physical Exam:  General:  Nontoxic  Skin: Numerous scattered telangiectasias.  Scalp with salt-and-pepper hypopigmentation  HEENT: NC/AT, anicteric sclera, pharynx clear, diminished oral aperture  Neck: Decreased range of motion  Cardiovascular: regular S1/S2  No murmur, rub , or gallop  Respiratory: Decreased breath sounds  GI/Abdomen: soft, NT/ND,  no peritoneal signs  Extremities: no clubbing, cyanosis, or edema  MSK: Sclerodactyly with autoamputations is dry ulcerations unchanged  Lymphatics: no lymphadenopathy  Neurology:  Alert and oriented x 3  Psych: cooperative with exam  : no Byrd          Lines, Drains, Airways, Wounds:  Peripheral IV (Adult) 24 Anterior;Left Forearm (Active)   Number of days: 0       Labs:    CBC Results         24     0448 0440 1335    WBC 15.09 7.50 11.76    RBC 3.53 3.69 4.43    HGB 9.6 10.0 12.0    HCT 31.9 32.8 40.2    MCV 90.4 88.9 90.7    MCH 27.2 27.1 27.1    MCHC 30.1 30.5 29.9     253 289          CMP Results         24     0448 0440 1335     140 138    K 4.3 4.6 5.3    Cl 105 107 105    CO2 23 21 23    Glucose 67 129 135    BUN 34 22 20    Creatinine 1.1 1.0 1.0    Calcium 8.7 8.8 9.5    Anion Gap 10 12 10    EGFR 56.6 >60.0 >60.0           Comment for NA at 1335 on 24: Moderate hemolysis, results " may be affected.     Comment for K at 1335 on 05/21/24: Results obtained on plasma. Plasma Potassium values may be up to 0.4 mEQ/L less than serum values. The differences may be greater for patients with high platelet or white cell counts.  Moderate hemolysis, results may be affected.     Comment for EGFR at 0448 on 05/23/24: Calculation based on the Chronic Kidney Disease Epidemiology Collaboration (CKD-EPI) equation refit without adjustment for race.    Comment for EGFR at 0440 on 05/22/24: Calculation based on the Chronic Kidney Disease Epidemiology Collaboration (CKD-EPI) equation refit without adjustment for race.    Comment for EGFR at 1335 on 05/21/24: Calculation based on the Chronic Kidney Disease Epidemiology Collaboration (CKD-EPI) equation refit without adjustment for race.              Estimated Creatinine Clearance: 43.3 mL/min (by C-G formula based on SCr of 1.1 mg/dL).      Assessment     1.  Interstitial lung disease  2.  Scleroderma  3.  Pulmonary hypertension  4.  HFpEF  5.  Shortness of breath secondary to her underlying pulmonary disease.  Low suspicion of infection   Sputum specimen consistent with saliva       Plan     1.  Antibiotics to be discontinued  2.  No infectious disease contraindications to Tocilizumab or steroids  3.  Once steroid regimen is established we will need to decide whether the patient is a candidate for PJP prophylaxis    Sebastián Haines MD

## 2024-05-24 VITALS
TEMPERATURE: 98 F | HEIGHT: 66 IN | HEART RATE: 83 BPM | DIASTOLIC BLOOD PRESSURE: 73 MMHG | OXYGEN SATURATION: 95 % | RESPIRATION RATE: 16 BRPM | WEIGHT: 115.6 LBS | SYSTOLIC BLOOD PRESSURE: 164 MMHG | BODY MASS INDEX: 18.58 KG/M2

## 2024-05-24 LAB
ANION GAP SERPL CALC-SCNC: 10 MEQ/L (ref 3–15)
ANION GAP SERPL CALC-SCNC: 8 MEQ/L (ref 3–15)
ATRIAL RATE: 87
BASOPHILS # BLD: 0.03 K/UL (ref 0.01–0.1)
BASOPHILS NFR BLD: 0.2 %
BUN SERPL-MCNC: 39 MG/DL (ref 7–25)
BUN SERPL-MCNC: 40 MG/DL (ref 7–25)
CALCIUM SERPL-MCNC: 9.2 MG/DL (ref 8.6–10.3)
CALCIUM SERPL-MCNC: 9.5 MG/DL (ref 8.6–10.3)
CHLORIDE SERPL-SCNC: 104 MEQ/L (ref 98–107)
CHLORIDE SERPL-SCNC: 105 MEQ/L (ref 98–107)
CO2 SERPL-SCNC: 24 MEQ/L (ref 21–31)
CO2 SERPL-SCNC: 24 MEQ/L (ref 21–31)
CREAT SERPL-MCNC: 1.1 MG/DL (ref 0.6–1.2)
CREAT SERPL-MCNC: 1.1 MG/DL (ref 0.6–1.2)
DIFFERENTIAL METHOD BLD: ABNORMAL
EGFRCR SERPLBLD CKD-EPI 2021: 56.6 ML/MIN/1.73M*2
EGFRCR SERPLBLD CKD-EPI 2021: 56.6 ML/MIN/1.73M*2
EOSINOPHIL # BLD: 0 K/UL (ref 0.04–0.36)
EOSINOPHIL NFR BLD: 0 %
ERYTHROCYTE [DISTWIDTH] IN BLOOD BY AUTOMATED COUNT: 16.3 % (ref 11.7–14.4)
GLUCOSE SERPL-MCNC: 86 MG/DL (ref 70–99)
GLUCOSE SERPL-MCNC: 87 MG/DL (ref 70–99)
HCT VFR BLD AUTO: 33.9 % (ref 35–45)
HGB BLD-MCNC: 10.3 G/DL (ref 11.8–15.7)
IMM GRANULOCYTES # BLD AUTO: 0.1 K/UL (ref 0–0.08)
IMM GRANULOCYTES NFR BLD AUTO: 0.6 %
LYMPHOCYTES # BLD: 1.62 K/UL (ref 1.2–3.5)
LYMPHOCYTES NFR BLD: 10 %
MAGNESIUM SERPL-MCNC: 2.1 MG/DL (ref 1.8–2.5)
MCH RBC QN AUTO: 27 PG (ref 28–33.2)
MCHC RBC AUTO-ENTMCNC: 30.4 G/DL (ref 32.2–35.5)
MCV RBC AUTO: 88.7 FL (ref 83–98)
MONOCYTES # BLD: 0.66 K/UL (ref 0.28–0.8)
MONOCYTES NFR BLD: 4.1 %
NEUTROPHILS # BLD: 13.81 K/UL (ref 1.7–7)
NEUTS SEG NFR BLD: 85.1 %
NRBC BLD-RTO: 0 %
P AXIS: 52
PDW BLD AUTO: 12 FL (ref 9.4–12.3)
PLATELET # BLD AUTO: 289 K/UL (ref 150–369)
POTASSIUM SERPL-SCNC: 4.4 MEQ/L (ref 3.5–5.1)
POTASSIUM SERPL-SCNC: 4.5 MEQ/L (ref 3.5–5.1)
PR INTERVAL: 128
QRS DURATION: 78
QT INTERVAL: 384
QTC CALCULATION(BAZETT): 462
R AXIS: 13
RBC # BLD AUTO: 3.82 M/UL (ref 3.93–5.22)
SODIUM SERPL-SCNC: 137 MEQ/L (ref 136–145)
SODIUM SERPL-SCNC: 138 MEQ/L (ref 136–145)
T WAVE AXIS: 103
TROPONIN I SERPL HS-MCNC: 23.7 PG/ML
TROPONIN I SERPL HS-MCNC: 24.2 PG/ML
VENTRICULAR RATE: 87
WBC # BLD AUTO: 16.22 K/UL (ref 3.8–10.5)

## 2024-05-24 PROCEDURE — 99233 SBSQ HOSP IP/OBS HIGH 50: CPT | Performed by: INTERNAL MEDICINE

## 2024-05-24 PROCEDURE — 99232 SBSQ HOSP IP/OBS MODERATE 35: CPT | Performed by: INTERNAL MEDICINE

## 2024-05-24 PROCEDURE — 63700000 HC SELF-ADMINISTRABLE DRUG

## 2024-05-24 PROCEDURE — 83735 ASSAY OF MAGNESIUM: CPT

## 2024-05-24 PROCEDURE — 80048 BASIC METABOLIC PNL TOTAL CA: CPT

## 2024-05-24 PROCEDURE — 63600000 HC DRUGS/DETAIL CODE

## 2024-05-24 PROCEDURE — 36415 COLL VENOUS BLD VENIPUNCTURE: CPT

## 2024-05-24 PROCEDURE — 93010 ELECTROCARDIOGRAM REPORT: CPT | Performed by: INTERNAL MEDICINE

## 2024-05-24 PROCEDURE — 85025 COMPLETE CBC W/AUTO DIFF WBC: CPT

## 2024-05-24 PROCEDURE — 99238 HOSP IP/OBS DSCHRG MGMT 30/<: CPT | Performed by: HOSPITALIST

## 2024-05-24 PROCEDURE — 84484 ASSAY OF TROPONIN QUANT: CPT

## 2024-05-24 RX ORDER — PREDNISONE 10 MG/1
10 TABLET ORAL DAILY
Status: DISCONTINUED | OUTPATIENT
Start: 2024-06-02 | End: 2024-05-24 | Stop reason: HOSPADM

## 2024-05-24 RX ORDER — BENZONATATE 100 MG/1
200 CAPSULE ORAL 3 TIMES DAILY PRN
Qty: 30 CAPSULE | Refills: 0 | Status: SHIPPED | OUTPATIENT
Start: 2024-05-24 | End: 2024-06-03

## 2024-05-24 RX ORDER — PREDNISONE 20 MG/1
20 TABLET ORAL DAILY
Status: DISCONTINUED | OUTPATIENT
Start: 2024-05-30 | End: 2024-05-24 | Stop reason: HOSPADM

## 2024-05-24 RX ORDER — LIDOCAINE 560 MG/1
1 PATCH PERCUTANEOUS; TOPICAL; TRANSDERMAL DAILY
Status: DISCONTINUED | OUTPATIENT
Start: 2024-05-24 | End: 2024-05-24 | Stop reason: HOSPADM

## 2024-05-24 RX ORDER — LIDOCAINE 560 MG/1
1 PATCH PERCUTANEOUS; TOPICAL; TRANSDERMAL DAILY PRN
Qty: 15 PATCH | Refills: 0 | Status: SHIPPED | OUTPATIENT
Start: 2024-05-24 | End: 2024-06-20 | Stop reason: SDUPTHER

## 2024-05-24 RX ORDER — FUROSEMIDE 20 MG/1
20 TABLET ORAL DAILY PRN
Qty: 30 TABLET | Refills: 0 | Status: SHIPPED | OUTPATIENT
Start: 2024-05-24 | End: 2024-06-20

## 2024-05-24 RX ORDER — PREDNISONE 20 MG/1
40 TABLET ORAL DAILY
Status: DISCONTINUED | OUTPATIENT
Start: 2024-05-25 | End: 2024-05-24 | Stop reason: HOSPADM

## 2024-05-24 RX ORDER — PREDNISONE 10 MG/1
TABLET ORAL
Qty: 26 TABLET | Refills: 0 | Status: SHIPPED | OUTPATIENT
Start: 2024-05-25 | End: 2024-06-20

## 2024-05-24 RX ORDER — ACETAMINOPHEN 325 MG/1
650 TABLET ORAL EVERY 6 HOURS PRN
Start: 2024-05-24 | End: 2024-06-23

## 2024-05-24 RX ADMIN — SILDENAFIL 20 MG: 20 TABLET, FILM COATED ORAL at 09:51

## 2024-05-24 RX ADMIN — CYANOCOBALAMIN TAB 1000 MCG 1000 MCG: 1000 TAB at 09:52

## 2024-05-24 RX ADMIN — HYDROMORPHONE HYDROCHLORIDE 0.25 MG: 1 INJECTION, SOLUTION INTRAMUSCULAR; INTRAVENOUS; SUBCUTANEOUS at 00:04

## 2024-05-24 RX ADMIN — Medication 1000 UNITS: at 09:52

## 2024-05-24 RX ADMIN — ACETAMINOPHEN 650 MG: 325 TABLET ORAL at 05:29

## 2024-05-24 RX ADMIN — PANTOPRAZOLE SODIUM 40 MG: 40 TABLET, DELAYED RELEASE ORAL at 09:52

## 2024-05-24 RX ADMIN — LIDOCAINE 4% 1 PATCH: 40 PATCH TOPICAL at 04:24

## 2024-05-24 RX ADMIN — PREDNISONE 40 MG: 20 TABLET ORAL at 09:51

## 2024-05-24 RX ADMIN — Medication: at 09:53

## 2024-05-24 RX ADMIN — LEVOTHYROXINE SODIUM 100 MCG: 0.1 TABLET ORAL at 05:28

## 2024-05-24 RX ADMIN — AMLODIPINE BESYLATE 10 MG: 10 TABLET ORAL at 09:51

## 2024-05-24 RX ADMIN — COLCHICINE 0.3 MG: 0.6 TABLET ORAL at 09:52

## 2024-05-24 RX ADMIN — SALINE NASAL SPRAY 2 SPRAY: 1.5 SOLUTION NASAL at 09:52

## 2024-05-24 RX ADMIN — SILDENAFIL 20 MG: 20 TABLET, FILM COATED ORAL at 14:35

## 2024-05-24 ASSESSMENT — COGNITIVE AND FUNCTIONAL STATUS - GENERAL
WALKING IN HOSPITAL ROOM: 4 - NONE
MOVING TO AND FROM BED TO CHAIR: 4 - NONE
CLIMB 3 TO 5 STEPS WITH RAILING: 3 - A LITTLE
STANDING UP FROM CHAIR USING ARMS: 4 - NONE

## 2024-05-24 NOTE — PROGRESS NOTES
Subjective:   Patient seen at bedside for continued management of BLE wounds. NAD. No overnight events.  Patient states she had chest pain overnight, but feeling better today.      ROS:   Past Medical/Surgical history, Allergies, Meds reviewed in detail as charted  FH/SH reviewed in detail as charted  Review of Systems:  Head and Neck: No complaints  Chest : No complaints  Abdomen: No Complaints  Constitutional: Unremarkable     Vitals: I have reviewed the patient's current vital signs as documented in the patient's EMR.    Radiology: No new Radiology.    Labs:   CBC Results         05/24/24 05/23/24 05/23/24     0436 2323 0448    WBC 16.22 16.11 15.09    RBC 3.82 3.66 3.53    HGB 10.3 9.9 9.6    HCT 33.9 31.8 31.9    MCV 88.7 86.9 90.4    MCH 27.0 27.0 27.2    MCHC 30.4 31.1 30.1     283 271             Objective:   -Vascular: DP/PT pulses are faintly palpable bilaterally.  CRT to all digits is less than 3 seconds bilaterally. Temperature from leg to digits is cool and even bilaterally.  -Orthopedic: Gross motor function to all digits is intact. Range of motion to bilateral ankle joint is WNL.  Multiple digital contractures noted to lesser digits  -Neurologic: light touch and epicritic sensation is intact  -Dermatologic: Skin to BLE is thin, scaly, xerotic.  Nails x 10 are elongated, crumbly, and discolored  RLE: Well adhered eschar noted to distal aspect of second digit.  No drainage noted.  No fluctuance or crepitus appreciated.  Small fibrotic wound noted medial aspect of ankle measuring approximate 1.0 cm x 1.0 cm.  Wound showing some signs of epithelialization.  Hyperpigmented, well-circumscribed lesion noted to lateral aspect of plantar foot.  LLE: Fibrous lesion noted to medial aspect of hallux measuring approximately 2.0 cm x 2.0 cm.  No drainage noted.  No fluctuance or crepitus appreciated.  No malodor noted.  Hyperpigmented, well-circumscribed lesion noted to plantar aspect of fourth  digit.    Assessment:  Arielle Felix is a 63 y.o. female presents with PMH of scleroderma, Raynaud's disease, ILD, CAD who presents with BLE wounds with low concern for infection and onychomycosis       Plan:  -Plan discussed with attending  -Patient was examined, evaluated, treated at bedside all questions and concerns addressed  -Medihoney and Band-Aids applied to all open wounds  -Nails x 10 were sharply debrided using a nail nipper without incident  -Labs and radiographs reviewed.  -WBAT to BLE  -Rest of care per primary team  -Stable for discharge from podiatry standpoint s  -Recommend that she follow-up with Dr. Null or Dr. Mensah at the [facility] wound center  -Please contact on call podiatry resident with any questions or concerns    Makayla Martínez DPM  PGY-1 #[id number]

## 2024-05-24 NOTE — PLAN OF CARE
Care Coordination Discharge Plan Note     Discharge Needs Assessment  Concerns to be Addressed: discharge planning, care coordination/care conferences  Current Discharge Risk:      Anticipated Discharge Plan  Anticipated Discharge Disposition: home with home health  Type of Home Care Services: home OT, home PT, nursing      Patient Choice  Offered/Gave Vendor List: yes  Patient's Choice of Community Agency(s): [facility]    Patient and/or patient guardian/advocate was made aware of their right to choose a provider. A list of eligible providers was presented and reviewed with the patient and/or patient guardian/advocate in written and/or verbal form. The list delineates providers in the patient’s desired geographic area who are participating in the Medicare program and/or providers contracted with the patient’s primary insurance. The Medicare list and quality ratings were obtained from the Medicare.gov [medicare.gov] website.    ---------------------------------------------------------------------------------------------------------------------    Interdisciplinary Discharge Plan Review:  Participants:     Concerns Comments: Mary was informed in rounds today pt is stable for d/c home. Doctor feels she is at baseline. Mray called pt's dtr to confirm transport and she told sw she will p/u pt around 3 pm today. Pt's portable tank is at bedside. Mary will send referral to [facility] to resume services. No f/u needed from mary @ this time.    Discharge Plan:   Disposition/Destination: Home Health Care - Other / Home  Discharge Facility:    Community Resources:      Discharge Transportation:  Is Out of Hospital DNR needed at Discharge: no  Does patient need discharge transport?

## 2024-05-24 NOTE — PLAN OF CARE
Plan of Care Review  Plan of Care Reviewed With: patient  Progress: no change  Outcome Evaluation: AOx4, VSS- tachy @ times. Pt c/o 10/10 crushing chest pain radiating down her L side; MD notified and @ bedside. EKG and labs completed. Tylenol administered w/o relief. Dilaudid administered w/ some relief- 8/10 pain. Pt requested lidocaine patch later in shift for continued chest pain- 5/10. Bed alarm on, call bell within reach. Care ongoing.

## 2024-05-24 NOTE — NURSING NOTE
Patient discharged home, left with daughter. Patient AAOX4, VSS. Patient adequate for discharge. Discharge summary given and reviewed. Medications reviewed. Patient and daughter verbalized an understanding of discharge instructions.

## 2024-05-24 NOTE — PROGRESS NOTES
Patient reported sudden onset chest pain that woke her up from sleep and radiates to her left leg. She reports that the pain is 8/10 and she reports tinglling in her left leg. Patient was hemodynamically stable during the episode but heart rate in the low 100s. Patient reported that this type of chest pain is new for her.     Physical Exam  Neck:      Comments: JVD   Cardiovascular:      Rate and Rhythm: Regular rhythm. Tachycardia present.      Pulses: Normal pulses.      Heart sounds: Normal heart sounds.   Pulmonary:      Effort: Pulmonary effort is normal.      Breath sounds: Normal breath sounds.   Abdominal:      Palpations: Abdomen is soft.   Musculoskeletal:      Right lower leg: No edema.      Left lower leg: No edema.   Neurological:      Mental Status: She is alert.         EKG done showed normal sinus rhythm unchanged from prior EKGs. Echo done earlier today showed normal EF and a small pericardial effusion.   Ordered CBC, BMP and troponins. Ordered tylenol but did not improve her pain. Patient reports opioids typically cause her to be nauseous when given in high doses but agreeable to trying low dose dilaudid 0.25 to help with the pain.   Will follow up labs and continue to monitor patient.      Octavio Giordano   PGY-1

## 2024-05-24 NOTE — PROGRESS NOTES
Cardiology Pulmonary Hypertension  Progress Note          Interval History: Examined laying in bed. reported last night while sleeping she suddenly felt left foot swelling pain that shot up her left leg, associated with chest pressure and dyspnea. She reported the pressure is her usual but its the 1st time she experiences all three today.   She repots feeling more short of breath today. However she is not requiring supplemental O2 and satting >90s    HsTrop mildly elevated, no EKG changes consistent with ACS        Consult Background from 05/22  Ms. Felix is a 63 y.o. female with a past medical history of Pulmonary HTN, HTN, Raynaud's Disease, Cutaneous Systemic Scleroderma (dx 1998), ILD, PE (3/2023). She is a patient of Dr. Nguyễn. She was previously followed by Dr. Jos Valdez at \[facility]\"". Echocardiogram from 4/21/2022 showed LVEF: 55-60%, mild aortic valve thickening, pulmonary artery systolic pressure: 52 mmHg and trivial pericardial effusion. LHC and RHC (separate occasions) over the last 5-10 years which showed normal hemodynamics and normal coronaries. She had a RHC 9/02/2020 showing top-normal right sided filling pressures with mild pulmonary hypertension, top-normal PVR and normal pulmonary capillary wedge pressure. The cardiac index was normal, seen below.     On 6/27/2022, she was in [facility] ER for chest pain. EKG: NSR without ischemic changes. hsTrop: 12->16, d-dimer: 0.56, CXR showed cardiomegaly and diffuse interstitial prominence.     Dr. Nguyễn ordered echocardiogram, which was completed on 8/22/2022 and showed estimated RVSP = 50-55 mmHg, normal-sized LV with normal LV systolic function. Estimated EF 55- 60%. Wall motion grossly normal, mild concentric left ventricular hypertrophy, grade I LV diastolic dysfunction, probably normal RV size and function.    At her initial visit on 10/11/2022, she reported that she felt very short of breath with minimal activity most of the time. She reports that  this started about 1 year prior and had progressively gotten worse. She described PND and bendopnea. She reported that she experienced ankle swelling, worsened over the past year and usually worse towards the end of the night. She also reported some swelling in her abdomen. She reported daily palpations. I recommended a RHC which was done 11/28/2022 and showed: Normal right sided filling pressures. Mildly elevated left sided filling pressures. Precapillary pulmonary hypertension with mean PA 36 mmHg.    She was hospitalized on 3/19/2023 at [facility] for PE diagnosed on V/Q scan. She reports that she had presented with left arm pain and heaviness. She states that she was started on Apixaban. One week after her last office visit (4/6/2023), she presented to [facility] [place] with chest pain, epistaxis and hemoptysis. Echocardiogram showed: left ventricular cavity size normal with mild left ventricular hypertrophy and preserved systolic function. Estimated EF 65%. Chest CTA showed no evidence of PE; Apixaban was discontinued.     She had a repeat echocardiogram (6/2023) which showed: Normal left ventricular cavity size and mild concentric left ventricular hypertrophy. Normal systolic function. EF: 60%. Normal right ventricular structure and function. Mild tricuspid valve regurgitation with estimated RVSP: 55 mmHg. Compared to previous study from 4/14/2023, estimated right ventricular systolic pressure were higher.    At her last office visit on 7/25/2023, she reported that she was not sure if she received/started the Macitentan. She stated at the time we initiated it, her Pulmonologist also started her on a new medication (Mycophenolate), and she had significant GI intolerance with this. It was trialed at various doses but ultimately stopped. At that visit, I asked her to check if she had Macitentan and if she did to start it and monitor her SpO2.    She underwent V/Q scan in September 2023, which showed  intermediate-to-high probability of pulmonary embolic disease, as a result she completed CTA Chest PE which showed: no evidence for filling defect or vessel cutoff to suggest pulmonary embolism. Enlargement of the pulmonary arteries compatible with pulmonary arterial hypertension was seen.    She started Macitentan around August or September 2023.    She presented to [facility] on 3/3/2024 with chest pressure, palpitations, nausea. She was noted to have hypoxia and tachypnea at presentation. Found to have moderate to large pericardial effusion without tamponade.   She was started on colchicine 0.6 mg BID on 3/4, and when she did not improve sympomatically prednisone 20 mg daily was added on 3/5. on 3/7, developed multiple episodes of diarrhea and colchicine dose decreased to 0.3 mg. Coronary CTA done as part of the ischemic workup showed moderate 2 vessel CAD and patient recommended to begin atorvastatin 20 mg daily and clopidogrel 75 mg daily but she refused those two medications.     She was discharged on Colchicine 0.3 mg for at least 3 months and Prednisone 20 mg for a total two week course until 3/19, then decrease the dose to 10 mg daily for 2 weeks until 4/2/2024,  then decrease dose to 5 mg daily for 2 weeks until 4/16.     She reportedly tapered off prednosione as instructed but had continued to take Colchicine as prescribed.   She completed an echocardiogram 04/10 which revealed Small-to-moderate sized, circumferential pericardial effusion. No echocardiographic evidence for cardiac tamponade.   Compared to previous study from 3/4/2024, no significant change. Pericardial effusion size is similar, estimated right atrial pressure lower.     She presented to [facility] 04/16 with increased pleuritic chest pain. On presentation to the ER she was noted to be hypertensive and tachycardic. Labs showed mild elevated troponin, elevated BNP, leukocytosis CT angio of the chest showed no evidence of PE, did show a large pericardial  effusion, emphysema, pulmonary hypertension, 16 mm left lobe lung mass. EKG showed sinus tachycardia, normal axis, nonspecific ST changes or [place] changes. She was again started on Prednisone taper. She refused starting Clopidogrel.   She was discharged on 2L of O2.     She reports she started new injectable medication Tocilizumab prescribed by her Rheumatologist on 04/29.     She presented again to [facility] on 05/21 at the request of her rheumatologist, during her visit there she was noted to be tachycardic and with some dyspnea, chest pain but improved from last admission.   On arrival satting appropriately, EKG sinus tachycardia  bpm. BNP >300, hsTrop 32 ->24.   POCUS in the emergency department showed evidence of pericardial effusion with no tamponade physiology. CT pulm angiogram shows no evidence of pulm embolism however there is evidence of bilateral changes concerning for volume overload.   She received Methylpredinsolone 125 mg in ED. Of note she was on Prednisone 10 mg daily at home.     Upon interview, she reported compliance with medication, however has been having a lot of palpitations, in the last few weeks, intermittent throughout the day but daily. Admits to some dyspnea but not worse than usual, but more shallow today, improved chest pain overall.   She reports improved appetite. Denies any PND or orthopnea, denies any LE swelling, admits to some abdominal swelling.            Past Medical / Surgical History:  Pulmonary HTN, HTN, Raynaud's Disease, Cutaneous Systemic Scleroderma (dx 1998), ILD, PE (3/2023), Follicular Carcinoma of Thyroid, Tenosynovitis, de Quervain, h/o Hernia Repair, h/o Appendicitis, h/o Digit Amputation, H/o Total Thyroidectomy (Dr. Pacheco at [facility]; 4/2013)    Family & Social History: She is , she has two children. She works as an .     Tobacco: former 2005  EtOH: never   Illicits: never     Her brother has CAD with stent  1st cousin with SLE  Both parents had  "\"heart problems\"    Allergies:   Allergies   Allergen Reactions    Aspirin Shortness of breath     Other reaction(s): Unknown  \"stop breathing\"      Ibuprofen Shortness of breath     Stop breathing    Iodine Shortness of breath     Other reaction(s): Unknown    Iodine And Iodide Containing Products Shortness of breath     ? rash    Penicillins Shortness of breath     Other reaction(s): Unknown    Sulfa (Sulfonamide Antibiotics) Angioedema    Clindamycin     Hydrochlorothiazide      Other reaction(s): Abdominal Pain   Slow heart rate    Oxycodone      Other reaction(s): Vomiting    Sulfamethoxazole-Trimethoprim      Other reaction(s): Vomiting, Weakness     Latex Rash       Medications:   Current Facility-Administered Medications   Medication Dose Route Frequency Provider Last Rate Last Admin    acetaminophen (TYLENOL) tablet 650 mg  650 mg oral q6h PRN Clarisa Lopez DO   650 mg at 05/24/24 0529    amLODIPine (NORVASC) tablet 10 mg  10 mg oral q AM Sheryl Li MD   10 mg at 05/23/24 1038    atorvastatin (LIPITOR) tablet 40 mg  40 mg oral Daily (6p) Sheryl Li MD   40 mg at 05/23/24 1436    benzonatate (TESSALON) capsule 200 mg  200 mg oral 3x daily PRN Bhargavi Mae MD   200 mg at 05/23/24 2045    cholecalciferol (vitamin D3) tablet 1,000 Units  1,000 Units oral Daily Sheryl Li MD   1,000 Units at 05/23/24 1039    colchicine (COLCRYS) tablet 0.3 mg  0.3 mg oral Daily Sheryl Li MD   0.3 mg at 05/23/24 1038    cyanocobalamin (VITAMIN B12) tablet 1,000 mcg  1,000 mcg oral Daily Sheryl Li MD   1,000 mcg at 05/23/24 1039    glucose chewable tablet 16-32 g of dextrose  16-32 g of dextrose oral PRN Sheryl Li MD        Or    dextrose 40 % oral gel 15-30 g of dextrose  15-30 g of dextrose oral PRN Sheryl Li MD        Or    glucagon (GLUCAGEN) injection 1 mg  1 mg intramuscular PRN Sheryl Li MD        Or    dextrose 50 % in water (D50) injection 12.5 g  25 mL intravenous PRN Jen" MD Sheryl        docusate sodium (COLACE) capsule 100 mg  100 mg oral Daily PRN Sheryl Li MD        enoxaparin (LOVENOX) syringe 40 mg  40 mg subcutaneous Daily (6p) Sheryl Li MD   40 mg at 05/23/24 1805    honey (MEDIHONEY) 100 % topical paste   Topical Daily Makayla Martínez, DPM        HYDROmorphone (DILAUDID) injection 0.25 mg  0.25 mg intravenous q6h PRN Octavio Giordano MD   0.25 mg at 05/24/24 0004    ipratropium-albuteroL (DUO-NEB) 0.5-2.5 mg/3 mL nebulizer solution 3 mL  3 mL nebulization q6h SPENCER Sheryl Li MD   3 mL at 05/23/24 1804    levothyroxine (SYNTHROID) tablet 100 mcg  100 mcg oral Daily (6:30a) Sheryl Li MD   100 mcg at 05/24/24 0528    lidocaine (ASPERCREME) 4 % topical patch 1 patch  1 patch Topical Daily Octavio Giordano MD   1 patch at 05/24/24 0424    macitentan (OPSUMIT) tablet 10 mg  10 mg oral Daily Timothy Arvizu DO   10 mg at 05/23/24 1040    meclizine (ANTIVERT) tablet 25 mg  25 mg oral Daily PRN Sheryl Li MD        pantoprazole (PROTONIX) tablet,delayed release (DR/EC) 40 mg  40 mg oral Daily Sheryl Li MD   40 mg at 05/23/24 1039    [START ON 5/30/2024] predniSONE (DELTASONE) tablet 20 mg  20 mg oral Daily Clarisa Lopez DO        [START ON 5/27/2024] predniSONE (DELTASONE) tablet 30 mg  30 mg oral Daily Clarisa Lopez DO        predniSONE (DELTASONE) tablet 40 mg  40 mg oral Daily Clarisa Lopez DO        sildenafiL (pulm.hypertension) (REVATIO) tablet 20 mg  20 mg oral TID Sheryl Li MD   20 mg at 05/23/24 2039    sodium chloride (OCEAN) 0.65 % nasal spray 2 spray  2 spray Each Nostril 2x daily PRN Clarisa Lopez DO   2 spray at 05/23/24 2040       Review of Systems:   All other systems reviewed and are negative except as per HPI.    Physical Exam:     Wt Readings from Last 10 Encounters:   05/23/24 52.4 kg (115 lb 9.6 oz)   04/17/24 53.5 kg (118 lb)   04/10/24 44.5 kg (98 lb)   03/16/24 44.6 kg (98 lb 5.2 oz)    02/01/24 47.6 kg (105 lb)   07/25/23 50.8 kg (112 lb)   07/25/23 50.8 kg (112 lb)   06/21/23 51.3 kg (113 lb)   04/19/23 51.7 kg (113 lb 14.4 oz)   04/06/23 52.6 kg (116 lb)       BP Readings from Last 5 Encounters:   05/24/24 (!) 175/79   04/19/24 (!) 156/71   04/10/24 128/72   03/16/24 (!) 136/6   02/01/24 120/70       Vitals:    05/24/24 0801   BP: (!) 175/79   Pulse: 87   Resp: 16   Temp: 36.8 °C (98.2 °F)   SpO2: 94%       Body mass index is 18.67 kg/m².  Body surface area is 1.56 meters squared.    Constitutional: NAD, AAOx3  HENT: Normocephalic, atraumatic head, sclerae anicteric, no cervical lymphadenopathy, trachea midline  Cardiovascular: regular rhythm and rate, no murmurs, rubs or gallops, JVP: 8-9 cm H2O   cm H2O, no carotid bruits.  Respiratory: CTA bilateral lung fields, no wheezes, rales or rhonchi  GI: soft, non-tender/non-distended  Musculoskeletal / Extremities: no peripheral edema, +2 pulses bilateral radial, DP/PT arteries, skin tightening on face and fingertips  Skin: no rashes  Neuro / Psych: no focal deficits, CNII-XII grossly intact, appropriate, cooperative    Diagnostic Data:     Results from last 7 days   Lab Units 05/24/24  0436 05/23/24  2323 05/23/24  0448 05/22/24  0440   SODIUM mEQ/L 138 137 138 140   POTASSIUM mEQ/L 4.4 4.5 4.3 4.6   CHLORIDE mEQ/L 104 105 105 107   CO2 mEQ/L 24 24 23 21   BUN mg/dL 40* 39* 34* 22   CREATININE mg/dL 1.1 1.1 1.1 1.0   CALCIUM mg/dL 9.5 9.2 8.7 8.8   GLUCOSE mg/dL 87 86 67* 129*   MAGNESIUM mg/dL 2.1  --  2.2 1.8     Results from last 7 days   Lab Units 05/24/24  0436 05/23/24  2323 05/23/24  0448   WBC K/uL 16.22* 16.11* 15.09*   HEMOGLOBIN g/dL 10.3* 9.9* 9.6*   HEMATOCRIT % 33.9* 31.8* 31.9*   MCV fL 88.7 86.9 90.4   PLATELETS K/uL 289 283 271     Component      Latest Ref Rng 4/13/2023 3/3/2024 3/12/2024   Lactate      0.4 - 2.0 mmol/L 1.0  1.1  0.9      Component      Latest Ref Rng 4/13/2023 2/27/2024 3/3/2024   BNP      <=100 pg/mL 105 (H)   82  119 (H)       Component      Latest [facility] 3/3/2024   High Sens Troponin I      <15.0 pg/mL 12.6    High Sens Troponin I       12.2      Component      Latest [facility] 3/4/2024   CRP      <7.00 mg/L 8.90 (H)       Component      Latest [facility] 3/4/2024   Sed Rate      0 - 30 mm/hr 119 (H)       Component      Latest [facility] 3/3/2024   D-Dimer, Quant      0.00 - 0.50 ug/mL FEU 0.68 (H)       Component      Latest [facility] 4/13/2023   Hemoglobin A1C      <5.7 % 4.4        Component      Latest [facility] 4/14/2023   Triglycerides      30 - 149 mg/dL 44    Cholesterol      <=200 mg/dL 194    HDL      >=55 mg/dL 49 (L)    LDL Calculated      <=100 mg/dL 136 (H)    Non-HDL, Calculated      mg/dL 145    RISK      <=5.0  4.0       Component      Latest [facility] 4/13/2023   High Sens Troponin I      <15.0 pg/mL 23.0 (H)       Component      Latest [facility] 4/14/2023   Ferritin      11 - 250 ng/mL 75      Component      Latest [facility] 4/13/2023   TSH      0.34 - 5.60 mIU/L 4.71      Component      Latest [facility] 4/14/2023   Iron      35 - 150 ug/dL 29 (L)    TIBC      270 - 460 ug/dL 245 (L)    UIBC      180 - 360 ug/dL 216    Iron Saturation      15 - 45 % 12 (L)        Component      Latest [facility] 6/27/2022 4/13/2023   BNP      <=100 pg/mL 111 (H)  105 (H)                                     ---    ECG (5/21/2024, personally reviewed):   Sinus tachycardia   Nonspecific ST and T wave abnormality   HR: 129 bpm     ---    TTE (4/10/2024, personally reviewed):  1. Normal left ventricular cavity size with mild concentric left ventricular hypertrophy. Normal systolic function. EF: 60%. No regional wall motion abnormalities. Cannot determine diastolic function. Global longitudinal strain not reported due to technical limitations of study.   2. Aortic valve has three cusps. Trace aortic regurgitation. Aortic valve and root sclerosis.  3. Thickened mitral valve leaflets. Trace mitral valve regurgitation. Normal left atrial size.    4. Normal right ventricular structure and function. Trace tricuspid valve regurgitation with estimated RVSP: 51 mmHg (assuming right atrial pressure of 3 mmHg). Normal right atrial size. Pulmonic valve structurally normal. Trace pulmonic valve regurgitation. Pulsed wave Doppler of the RVOT systolic profile shows decreased acceleration times and geometry consistent with mildly elevated PVR.  5. IVC size is normal with > 50% inspiratory collapse consistent with normal right atrial pressure. Small-to-moderate sized, circumferential pericardial effusion. No echocardiographic evidence for cardiac tamponade.   6. Compared to previous study from 3/4/2024, no significant change. Pericardial effusion size is similar, estimated right atrial pressure lower.         CTA Abd / Pelvis (3/13/2024, personally reviewed):   There is dilatation of the of proximal/mid small bowel with relatively abrupt  transition left hemiabdomen, likely obstruction. Questionable lesion at the  transition point. This could be better evaluated with CT or MRI enterography.     Aorta and major branches patent. Superior mesenteric artery patent without  significant stenosis.  Suspect moderate stenosis of the origins of the of renal arteries bilaterally.     Large pericardial effusion, no change.     Patchy opacities lower lungs, similar to prior.     TTE (3/4/2024, personally reviewed):   Normal-sized left ventricle. Mild left ventricular hypertrophy. Preserved systolic function. LVEF 60%. No regional wall motion abnormalities. Grade II diastolic dysfunction.  Normal global longitudinal strain (-20%).  The aortic valve is not well seen.  Cannot determine the number of cusps.  Sclerotic leaflets.  No aortic stenosis.  Trace aortic insufficiency.  Normal sized aortic root.  The visualized portion of the ascending aorta is normal in size.  The mitral valve leaflets are thickened. Mild mitral annular calcification. Trace mitral regurgitation.   Mildly  dilated left atrium.  Normal-sized right ventricle. Normal right ventricular systolic function.  Thickened tricuspid valve. There is mild tricuspid regurgitation with estimated RVSP of 46 mmHg.   Pulmonic valve is not well visualized. Trace pulmonic regurgitation.   Mildly dilated right atrium.  IVC is enlarged with <50% respiratory variation consistent with elevated right atrial filling pressure (at least 15 mmHg).   Moderate, circumferential, circumferential pericardial effusion.  The largest pocket is located inferolaterally.  Elevated right atrial pressure.  No other clear echocardiographic features of increased pericardial pressure.  Correlate clinically.   Compared to previous TTE from 6/21/2023, pericardial effusion now moderate in size.  Right atrial pressure now elevated.       CTA Chest (3/3/2024):   IMPRESSION:  1.  No evidence of acute pulmonary embolism to the level of the segmental  branches.  2.  Moderate to large pericardial effusion measuring higher than simple fluid  density.  3.  Lower lobe lung field predominant interstitial thickening with some relative  subpleural sparing, consistent with a chronic interstitial lung disease,  unchanged from the prior study.  4.  Continued bilateral axillary, mediastinal, and bilateral hilar  lymphadenopathy, not definitely changed from the prior study, possibly related  to the patient's sarcoid.  5.  Emphysema.     CTA Chest PE (10/4/2023):  IMPRESSION:  1. No evidence for filling defect or vessel cutoff to suggest pulmonary  embolism.  Enlargement of the pulmonary arteries compatible with pulmonary  arterial hypertension.  2. Changes throughout the lungs as described above which can be seen with  systemic sclerosis/scleroderma.  Underlying pneumonia the lung bases cannot be  entirely excluded in the proper clinical setting.  3.  Additional stable findings as described above.        VQ (9/12/2023):  IMPRESSION:  Intermediate to high probability of pulmonary  embolic disease.     6MWT (7/25/2023, on Sildenafil 20 mg TID):        TTE (6/21/2023, personally reviewed):  1. Normal left ventricular cavity size and mild concentric left ventricular hypertrophy. Normal systolic function. EF: 60%. No regional wall motion abnormalities. Grade I diastolic dysfunction.   2. Aortic valve cusps not well visualized. Trace aortic regurgitation. Aortic valve and root sclerosis.  3. Thickened mitral valve leaflets. Mild mitral annular calcification. Trace mitral regurgitation. Mildly dilated left atrial size.   4. Normal right ventricular structure and function. Mild tricuspid valve regurgitation with estimated RVSP: 55 mmHg (assuming right atrial pressure of 3 mmHg). Normal right atrial size. Pulmonic valve not well visualized. Trace pulmonic valve regurgitation. Pulsed wave Doppler of the RVOT systolic profile show decreased acceleration times ( msec) and late systolic notching consistent with elevated PVR.   5. IVC size is normal with > 50% inspiratory collapse consistent with normal right atrial pressure. Small pericardial effusion without echocardiographic evidence of tamponade.  6. Compared to previous study from 4/14/2023, estimated right ventricular systolic pressure is higher.        TTE (4/14/2023, personally reviewed):   The left ventricular cavity size is normal with mild left ventricular hypertrophy and preserved systolic function. Estimated EF 65%. No regional wall motion abnormalities. Grade I diastolic dysfunction.     The aortic valve is tricuspid. Aortic valve sclerosis. Trace aortic regurgitation.      The visualized portions of the aortic root and ascending aorta are normal in size.     The mitral valve is mildly thickened. Mild mitral annular calcification. Trace mitral regurgitation.       The left atrium is severely dilated.      The right ventricle is normal in size with preserved systolic function     The pulmonic valve is not well seen.     The tricuspid  valve is normal in appearance. mild tricuspid regurgitation with an estimated RVSP of 34 mmHg.       Right atrium is normal in size.     The IVC is small and collapses > 50% with inspiration consistent with normal right atrial pressures.     Small pericardial effusion, mostly posterior.       Compared to previous echocardiogram from 8/22/2022, there is no significant change        TTE (11/28/2022, personally reviewed):   Normal right- and mildly elevated left-sided filling pressures (RA: 3 mmHg, PCWP: 13 mmHg)  Elevated pulmonary artery pressures (60/22(35 mmHg)) in the setting of PCWP: 13 mmHg.   Normal cardiac output and index (CO: 5.1 L/min, CI: 3.2 L/min/m2)  PVR: 4.3 Wood units. SVR: 1840 dynes/sec/cm5      TTE (8/22/2022, personally reviewed):  Normal-sized LV. Normal LV systolic function. Estimated EF 55- 60%. Wall motion appears grossly normal. Mild concentric left ventricular hypertrophy. Grade I LV diastolic dysfunction.  Pulmonic valve not well visualized. Grossly normal pulmonic valve structure. Trace pulmonic valve regurgitation. No pulmonic valve stenosis.  Aortic valve not well visualized. Aortic valve not well seen but likely trileaflet. Focal aortic valve calcification present. Sclerotic aortic valve leaflets. Mild aortic valve regurgitation. No aortic valve stenosis.  RV not well visualized. Normal-sized RV. Normal RV systolic function.  Normal-sized RA.  There is a small loculated pericardial effusion anterior to the right atrium. No cardiac tamponade.  Sclerotic mitral valve. Mitral valve calcification of the anterior leaflet present.  Trace mitral valve regurgitation.  No significant mitral valve stenosis.  Normal-sized IVC. IVC demonstrates normal respiratory collapse.  Tricuspid valve structure is grossly normal. Mild to moderate tricuspid valve regurgitation.  Estimated RVSP = 50-55 mmHg.  Mildly dilated LA.  No previous echocardiogram available for comparison.     TTE (4/21/2022, outside  study):  This result has an attachment that is not available.     A complete transthoracic echocardiogram (including 2D, color flow   Doppler, spectral Doppler and M-mode imaging) was performed using the   standard protocol. The study quality was adequate.     The left ventricle is normal in size. There is moderately increased   wall thickness consistent with moderate concentric hypertrophy.   Endocardium is incompletely visualized, but no regional wall motion   abnormalities are noted in the visualized segments. Normal left   ventricular ejection fraction. The left ventricular ejection fraction by   visual estimate is 55% to 60%.     The right ventricle is normal in size. There is normal function of the   right ventricle.     The left atrium is normal in size. Left atrial volume is 58 mL.     The right atrial volume measures 52 mL. The right atrial volume index   measures 34 mL/m2.     There is trace mitral regurgitation. There is no mitral stenosis.     The aortic valve is trileaflet. There is mild aortic valve thickening.   There is no aortic stenosis. There is trace aortic regurgitation.     There is mild to moderate tricuspid regurgitation. Pulmonary artery   systolic pressure measures 52 mmHg. The pulmonary artery systolic pressure   is moderately elevated.     The aorta measures 3.2 cm at the sinus of Valsalva. The proximal   segment of the ascending aorta is mildly enlarged. The proximal segment of   the ascending aorta measures 3.4 cm. The proximal segment of the ascending   aorta measures 2.2 cm/m2, indexed for body surface area.     Trivial pericardial effusion present. There is no echocardiographic   evidence of cardiac tamponade.     The inferior vena cava is normal in size (diameter <21 mm) and   decreases >50% in size with inspiration, suggesting a normal right atrial   pressure of 3 mmHg (range 0-5 mm Hg).     Compared to the prior study of 3/13/2020, there are no significant   changes.        PFT  (3/23/2022):      PFT (7/14/2021):      CT Chest (5/2/2021, outside study):  CLINICAL INFORMATION: Systemic sclerosis. Interstitial lung disease. Groundglass nodule     PROCEDURE: Unenhanced CT of the chest was performed using thin section reconstructions. Images were obtained on inspiration and expiration.     COMPARISON: June and August 2020     FINDINGS:     LUNGS, PLEURA:     Groundglass opacities with reticulation with subpleural sparing in the lower lobes, without honeycombing or architectural distortion, unchanged.     Right upper lobe groundglass nodule, image 114 of series 3, 8 x 11 mm, previously 6 mm.     Expiratory images are of diagnostic quality and do not demonstrate evidence of clinically significant air trapping.     CARDIOVASCULAR, MEDIASTINUM, THYROID: Coronary calcifications. Trace pericardial effusion.     LYMPH NODES: Subcentimeter mediastinal lymph nodes, unchanged. Unchanged axillary lymph nodes.     SKELETON, CHEST WALL: No destructive bone lesion     UPPER ABDOMEN: Patulous esophagus. 3 mm right renal stone.       RHC (9/02/2020):  RA   8 mmHg   RV   40/5 mmHg   PA (mean)  44/16 (25) mmHg   PCWP   12 mmHg   CO   4.65 lpm (Thermodilution)   CI   2.65 lpm/m-squared   SVI    33 ml/beat/m-squared (lower limit normal 29)   PVR   2.8 mmHg/l/min (Wood units)   SVR   2064 dyne-sec-cm-5         PFT (3/18/2020):      CCTA (3/7/2024, personally reviewed):      LEFT MAIN: Patent.     LAD: Proximal: Calcified plaque with 30 to 50% stenosis.  Mid: Mixed plaque with  50 to 70% stenosis, CT FFR 0.89.  Distal: Patent.  D1: Patent  D2: Patent, CT FFR 0.88     LCX: The circumflex and 1 marginal branch are patent with normal CT FFR.     RCA: Proximal: Calcified plaque with minimal stenosis.  Otherwise the RCA, PDA,  and PLB are patent with normal CT FFR.     CORONARY CALCIUM COMPOSITE AGATSTON SCORE: 313  LM: LAD: 312    LCX: RCA: 1     This places the patient in the HICKS 96th risk percentile for age and  gender  matched individuals.     VENTRICULAR FUNCTION AND WALL MOTION     LV EJECTION FRACTION: 66%  LV WALL MOTION: Normal    SUMMARY:  1. Two-vessel coronary artery disease as above that is moderate in severity.  CT  FFR is normal..  2. There is mitral annular calcification and aortic sclerosis.  The right atrium  is enlarged.  There is left ventricular hypertrophy.  There is a moderate to  large circumferential pericardial effusion.  3. Normal left ventricular wall motion and systolic function.  4. Coronary calcium score 313, 96th percentile     TTE (3/13/2020, outside study):  · The study quality was good.   · The left ventricle is normal in size. Top normal left ventricular wall   thickness. There are no segmental wall motion abnormalities. The left   ventricular ejection fraction is 55-60% by visual estimate and 3D   echocardiography. Normal left ventricular ejection fraction.   · There is grade I (mild) LV diastolic dysfunction.   · The right ventricle is normal in size. There is normal function of the   right ventricle.   · The right atrium is mildly dilated.   · There is mild mitral valve anterior leaflet thickening. There is mild   mitral valve leaflet calcification. There is flattening of the mitral   leaflets without raphael prolapse. There is trace mitral regurgitation.   · The aortic valve is trileaflet. There is mild thickening of the aortic   valve. There is mild calcification of the aortic valve. There is no aortic   /stenosis. There is trace aortic regurgitation.   · There is mild tricuspid valve leaflet thickening. There is mild to   moderate tricuspid regurgitation. The pulmonary artery systolic pressure   is moderately elevated. Pulmonary artery systolic pressure measures 50   mmHg. There is mid-systolic notching of the RVOT VTI consistent with   elevated pulmonary vascular resistance.   · The inferior vena cava is normal in size (diameter <21 mm) and decreases   >50% in size with inspiration,  suggesting a normal right atrial pressure   of 3 mmHg (range 0-5 mm Hg).   · Trivial loculated pericardial effusion present adjacent to the right   ventricle and adjacent to the right atrium.          Assessment / Plan:     1.Tachycardia - EKG sinus tachycardia HR 120s on admission. PE ruled out on CTA chest. Resolved, HR 80s    -Normal TSH/T4    2. Musculoskeletal Pain Including Chest Pain   -Echocardiogram 4/10 showed overall small-to-moderate sized, circumferential pericardial effusion similar to 3/4/2024 study.  -Repeat echocardiogram  - Suspect this is auto-immune disease related inflammation; Recently admitted (3/2024 and 4/2024) and treated for pericardial effusion treated with Colchicine and steroid taper.   -Continue Colchicine 0.3 mg. She completed prednisone taper x 2. Currently on Prednisone 10 mg daily at home. Received IV Methylprednisolone here.   - CCTA revealed moderate non-obstructive CAD, calcium score of 313.     3. Pericardial Effusion  - Stable small-to-moderate in size on echo from 4/10 without clinical tamponade.   - TTE revealed Small pericardial effusion, predominantly adjacent to right atrium. No evidence of hemodynamic compromise with normal respiratory variation and no chamber collapse. 05/2024  -Continue Colchicine 0.3 mg daily. As above.     4. Pulmonary Hypertension (WHO Group I, NYHA/WHO FC III):   - Occurring with the background of Systemic Scleroderma with ILD. August 2022 TTE showed normal RV size and function, but progressive exertional decline, worsened DLCO and resting tachycardia suggest there may be progression of her pulmonary vascular disease and limitation of cardiac output. This was proven with 11/2022 RHC showing mean PA 35 mmHg (with PCWP 13 mmHg) and PVR 4.3 Wood units.  TTE from 04/2024 Normal right ventricular structure and function. Trace tricuspid valve regurgitation with estimated RVSP: 51 mmHg   TTE 5/2024 Normal right ventricular size with preserved systolic  function. Tiny IVC with >50% respiratory variation consistent with low-normal right sided filling pressures    ---    -S/P IVP Furosemide 20 mg yesterday and day prior with reported good urinary output. However no I&O.   -PRN Furosemide 20 mg for weight gain + symptoms.   -Record I&O and get standing weights.   - Continue uninterrupted Sildenafil and Macitentan (she was given 3 tablets from out office)    4. HTN:   - Elevated on arrival  - Continued Amlodipine.     5. Systemic Scleroderma / Raynaud's Disease:   - Per Rheumatology (Outpatient: Dr. Trevizo).   - She has not tolerated trials of Mycophenolate.   - On Tocilizumab      6. ILD:   - Per Pulmonology and Rheumatology (Dr. Trevizo) outpatient.   - She has not tolerated trials of Mycophenolate.   - She was started onTocilizumab.     7. CAD  - LAD and RCA non-obstructive disease on 3/7 CCTA  - LDL goal < 70  -She is allergic to ASA  -At last admission she refused Clopidogrel  -she is continued on Atorvastatin     9. Lung nodule - CTA chest revealed 16 mm left lower lobe masslike density which may represent atelectasis, lung cancer or lymphoma  She saw pulmonary last admission and recommended outpatient PET CT        RAS Marcos

## 2024-05-24 NOTE — PROGRESS NOTES
"Infectious Disease Progress Note    Patient Name: Arielle Felix  MR#: [id number]  : 1960  Admission Date: 2024  Date: 24   Time: 8:51 AM   Author: Sebastián Haines MD        Antibiotics:        Subjective     No complaints offered.  Intermittent dry cough had episode of left leg pain last night, she was evaluated by the resident and offered Dilaudid.  Currently pain-free    Objective     Vital Signs:    Visit Vitals  /80 (BP Location: Right upper arm, Patient Position: Lying)   Pulse 88   Temp 36.7 °C (98.1 °F) (Oral)   Resp 18   Ht 1.676 m (5' 5.98\")   Wt 52.4 kg (115 lb 9.6 oz)   LMP  (LMP Unknown)   SpO2 93%   BMI 18.67 kg/m²       Temp (24hrs), Av.5 °C (97.7 °F), Min:36.3 °C (97.4 °F), Max:36.7 °C (98.1 °F)    Physical Exam:  General:  Nontoxic  Skin: Scattered telangiectasias  HEENT: NC/AT, anicteric sclera, pharynx clear, small oral aperture no oral lesions  Cardiovascular: regular S1/S2  No murmur, rub , or gallop  Respiratory: clear to auscultation  GI/Abdomen: soft, NT/ND,  no peritoneal signs  Extremities: Sclerodactyly.  Hand status post several amputations.  Right fourth toe with dry ulceration at tip  Lymphatics: no lymphadenopathy  Neurology:  Alert and oriented x 3  Psych: cooperative with exam  : no Byrd          Lines, Drains, Airways, Wounds:  Peripheral IV (Adult) 24 Anterior;Left Forearm (Active)   Number of days: 1       Labs:    CBC Results         24     0436 [id number] 0448    WBC 16.22 16.11 15.09    RBC 3.82 3.66 3.53    HGB 10.3 9.9 9.6    HCT 33.9 31.8 31.9    MCV 88.7 86.9 90.4    MCH 27.0 27.0 27.2    MCHC 30.4 31.1 30.1     283 271          CMP Results         24     0436 2323 0448     137 138    K 4.4 4.5 4.3    Cl 104 105 105    CO2 24 24 23    Glucose 87 86 67    BUN 40 39 34    Creatinine 1.1 1.1 1.1    Calcium 9.5 9.2 8.7    Anion Gap 10 8 10    EGFR 56.6 56.6 56.6        "    Comment for EGFR at 0436 on 05/24/24: Calculation based on the Chronic Kidney Disease Epidemiology Collaboration (CKD-EPI) equation refit without adjustment for race.    Comment for EGFR at 2323 on 05/23/24: Calculation based on the Chronic Kidney Disease Epidemiology Collaboration (CKD-EPI) equation refit without adjustment for race.    Comment for EGFR at 0448 on 05/23/24: Calculation based on the Chronic Kidney Disease Epidemiology Collaboration (CKD-EPI) equation refit without adjustment for race.              Estimated Creatinine Clearance: 43.3 mL/min (by C-G formula based on SCr of 1.1 mg/dL).      Assessment     1.  Interstitial lung disease   Received a dose of Tocilizumab last night  2.  Scleroderma  3.  Pulmonary hypertension  4.  HFpEF  5.  Shortness of breath secondary to her underlying pulmonary disease.  Low suspicion of infection       Plan     1.  No antibiotics  2.  Currently on 40 mg of prednisone daily, depending on how far her prednisone taper goes she may be candidate for PJP prophylaxis.  This question should be addressed at the time of discharge when her treatment plan has been finalized  3.  Discussed with Dr. Trevizo  4.  Will follow PRN    Sebastián Haines MD

## 2024-05-24 NOTE — DISCHARGE SUMMARY
Internal Medicine  Inpatient Discharge Summary        BRIEF OVERVIEW   Admitting Provider: Ritu Peterson DO  Attending Provider: Kush Yanes MD Attending phys phone: [phone]    PCP: Sara Simmons [phone]    Admission Date: 5/21/2024  Discharge Date: 5/24/2024     DISCHARGE DIAGNOSES      Primary Discharge Diagnosis  Chest pain, unspecified type    Secondary Discharge Diagnoses  Active Hospital Problems    Diagnosis Date Noted    Interstitial lung disease (CMS/HCC) 11/28/2022     Priority: High    Chest pain, unspecified type 05/22/2024    Multiple open wounds of lower extremity 05/22/2024    Acute hypoxic respiratory failure (CMS/HCC) 05/22/2024    CAD (coronary artery disease) 04/18/2024    Lung mass 04/16/2024    Palliative care by specialist 04/14/2023    Debility 04/14/2023    Pericardial effusion 04/13/2023    Scleroderma (CMS/HCC) 04/06/2023    Shortness of breath 08/12/2022    Raynaud's disease 08/09/2022    Pulmonary hypertension (CMS/HCC) 08/09/2022    Immunocompromised state (CMS/[facility]) 07/11/2022      Resolved Hospital Problems   No resolved problems to display.       Active Problem List on Day of Discharge  Patient Active Problem List   Diagnosis    Immunocompromised state (CMS/HCC)    Open wound of right foot    Essential (primary) hypertension    Hyperlipidemia, unspecified    Raynaud's disease    Pulmonary hypertension (CMS/HCC)    Shortness of breath    Interstitial lung disease (CMS/HCC)    Scleroderma (CMS/HCC)    Right ventricular dysfunction    Pericardial effusion    Hemoptysis    History of pulmonary embolism    Vertigo    Debility    Dyspnea    Acute on chronic heart failure with preserved ejection fraction (CMS/HCC)    Anemia    Palliative care by specialist    Hypothyroid    Nausea and vomiting    Acute chest pain    GERD (gastroesophageal reflux disease)    Lung mass    CAD (coronary artery disease)    Chest pain, unspecified type    Multiple open wounds of lower  extremity    Acute hypoxic respiratory failure (CMS/HCC)     SUMMARY OF HOSPITALIZATION      Presenting Problem/History of Present Illness  This is a 63 y.o. year-old female admitted on 5/21/2024 with Palpitations [R00.2]  SVT (supraventricular tachycardia) (CMS/HCC) [I47.10]  Pericardial effusion [I31.39]  Weakness [R53.1]  Hemoptysis [R04.2]  Chest pain, unspecified type [R07.9].     63 y.o. female with a past medical history of Pulm hypertension, Raynaud's disease, scleroderma diagnosed in 1998, interstitial lung disease, lung nodule, pulmonary embolism 2023, hypothyroidism, coronary artery disease, pericardial effusion, chronic lower extremity ulcers, hypertension, and hyperlipidemia who presented to the emergency department per advice from her rheumatologist with symptoms of shortness of breath, fatigue, and evidence of tachycardia during office visit     Patient was at her rheumatologist office and she was found to be tachycardic, fatigued, short of breath and was advised to present to the emergency department for further assessment.  During her previous hospitalization, she was discharged with home oxygen which she has been using as needed but was at night.  Upon arrival to the ED, patient was found to be afebrile, hypertensive 211/94, tachycardic to 135, RR 22  Labs notable for WBC 11.76, HS 24.1 --> 32.2, .     She was initiated on IV steroids admitted to medicine for management of medical conditions:    Hospital Course  #Acute Hypoxic Respiratory Failure   #Interstitial Lung Disease   #Pulmonary Hypertension  #Tachycardia     Patient presented with progressively worsening shortness of breath likely multifactorial secondary to interstitial lung disease, pulmonary hypertension.  While patient was initially started on antibiotics, it was determined that this was likely noninfectious etiology was discussed with ID and antibiotics were discontinued.  Pulmonology and cardiology evaluated patient while in  the hospital.  She was initiated on IV steroids with taper of oral prednisone.  It was suspected that patient's presentation likely secondary to volume overload and she was treated with IV diuresis.  Tachycardia resolved and shortness of breath improved prior to discharge.  Repeat echocardiogram performed due to concern for pericardial effusion during last hospitalization.  Noted small pericardial effusion with prominent epicardial fat pad.  While in the hospital, patient's macitentan was delivered to her house and no one was able to pick this up.  After discussion with patient and daughter, family members were able to obtain the medicine from her house and bring him to the hospital.  She was continued on muscle tension and sildenafil while in the hospital. Patient received her tocilizumab infusion as an inpatient on 5/23/2024.  After discussion with patient's primary cardiologist, she should be discharged with as needed furosemide 20 mg for weight gain and symptoms of shortness of breath.  She is to continue oral prednisone taper on discharge.     - Continue uninterrupted sildenafil and macitentan  - Continue prednisone taper prior to follow-up appointment with pulmonologist Dr. Wasserman  - Pul appointment scheduled for June 4, 2024   - Continue scheduled tocilizumab infusions  - Starting as needed furosemide 20 mg for weight gain 3 pounds in 1 day or 5 pounds in 1 week, symptoms of volume overload    #Chest Pain   Patient had multiple episodes of chest discomfort throughout hospitalization, similar to prior events during last hospitalization.  EKG with reassuring findings, not consistent with coronary artery disease.  Subsequent EKG is unchanged.  Troponin not suggestive of cardiac etiology.  Likely suspect chest pain secondary to musculoskeletal potentially costochondritis.  Patient should continue to treat symptomatically.  Return precautions for concerning chest pain with radiating symptoms, nausea, vomiting,  diaphoresis, etc     #B/l lower extremity wound, chronic   Patient evaluated by podiatry for chronic bilateral lower extremity ulcers with eschar.  Low evidence of infection.  Medihoney applied and dressing changes performed.  Recommend the patient follow-up with Dr. Mensah at [facility] wound center    #Incidental Findings   Vague 1.3cm left upper lobe nodular opacity on CXR   CT PE: Multiple new perifissural left lower lobe nodule suspicious for neoplastic.  PET/CT and/or tissue sampling recommended    Exam on Day of Discharge  Constitutional:       General: She is in acute distress.      Appearance: She is ill-appearing.   HENT:      Head: Normocephalic and atraumatic.   Eyes:      General: No scleral icterus.     Conjunctiva/sclera: Conjunctivae normal.   Cardiovascular:      Rate and Rhythm: Normal rate and regular rhythm.      Pulses: Normal pulses.      Heart sounds: Normal heart sounds.   Pulmonary:      Effort: Pulmonary effort is normal.      Breath sounds: Normal breath sounds.   Abdominal:      General: Abdomen is flat. Bowel sounds are normal. There is no distension.      Palpations: Abdomen is soft.      Tenderness: There is no abdominal tenderness. There is no guarding.   Musculoskeletal:         General: No swelling.      Cervical back: Normal range of motion and neck supple.      Right lower leg: No edema.      Left lower leg: No edema.   Skin:     General: Skin is warm and dry.      Capillary Refill: Capillary refill takes less than 2 seconds.      Coloration: Skin is not jaundiced.      Comments: Diffuse skin tightening on both hands with autoamputation of multiple distal phalanxes  Ulcerations most notable in the right second toe and the left hallux (please see media)     Neurological:      Mental Status: She is alert and oriented to person, place, and time.     Consults During Admission  IP CONSULT TO WOUND OSTOMY CONTINENCE  IP CONSULT TO RHEUMATOLOGY  IP CONSULT TO PAIN/PALLIATIVE  CARE  IP CONSULT TO NUTRITION SERVICES  IP CONSULT TO CARDIOLOGY  IP CONSULT TO PULMONOLOGY/SLEEP MEDICINE  IP CONSULT TO INFECTIOUS DISEASE  IP CONSULT TO SPIRITUAL HEALTH AND EDUCATION  IP CONSULT TO PODIATRY    DISCHARGE MEDICATIONS      Medication List        START taking these medications      acetaminophen 325 mg tablet  Commonly known as: TYLENOL  Take 2 tablets (650 mg total) by mouth every 6 (six) hours as needed for pain.  Dose: 650 mg     benzonatate 100 mg capsule  Commonly known as: TESSALON  Take 2 capsules (200 mg total) by mouth 3 (three) times a day as needed for cough for up to 10 days.  Dose: 200 mg     furosemide 20 mg tablet  Commonly known as: LASIX  Take 1 tablet (20 mg total) by mouth daily as needed (weight gain, shortness of breath).  Dose: 20 mg     lidocaine 4 % adhesive patch,medicated topical patch  Commonly known as: ASPERCREME  Apply 1 patch topically daily as needed (pain). Remove & discard patch within 12 hours or as directed by prescriber.  Dose: 1 patch     sodium chloride 0.65 % nasal spray  Commonly known as: OCEAN  Administer 2 sprays into each nostril 2 (two) times a day as needed for congestion.  Dose: 2 spray            CHANGE how you take these medications      predniSONE 10 mg tablet  Commonly known as: DELTASONE  Start taking on: May 25, 2024  Take 4 tablets (40 mg total) by mouth daily for 2 days, THEN 3 tablets (30 mg total) daily for 3 days, THEN 2 tablets (20 mg total) daily for 3 days, THEN 1 tablet (10 mg total) daily for 3 days.  What changed:   medication strength  See the new instructions.            CONTINUE taking these medications      ACTEMRA 200 mg/10 mL (20 mg/mL) solution  Infuse 8 mg/kg into a venous catheter every 28 (twentyeight) days.  Dose: 8 mg/kg  Generic drug: tocilizumab     amLODIPine 5 mg tablet  Commonly known as: NORVASC  Take 10 mg by mouth every morning.  Dose: 10 mg     atorvastatin 40 mg tablet  Commonly known as: LIPITOR  Take 1 tablet  (40 mg total) by mouth daily.  Dose: 40 mg     biotin 1 mg tablet  Take 1,000 mcg by mouth daily.  Dose: 1,000 mcg     cholecalciferol (vitamin D3) 1,000 unit (25 mcg) tablet  Take 1,000 Units by mouth daily.  Dose: 1,000 Units     colchicine 0.6 mg tablet  Commonly known as: COLCRYS  Take 0.5 tablets (0.3 mg total) by mouth daily.  Dose: 0.3 mg     cyanocobalamin (vitamin B-12) 1,000 mcg capsule  Take 1,000 mcg by mouth daily.  Dose: 1,000 mcg     docusate sodium 100 mg capsule  Commonly known as: COLACE  Take 100 mg by mouth daily as needed for constipation.  Dose: 100 mg     levothyroxine 100 mcg tablet  Commonly known as: SYNTHROID  Take 100 mcg by mouth daily.  Dose: 100 mcg     macitentan 10 mg tablet tablet  Commonly known as: OPSUMIT  Take 1 tablet (10 mg total) by mouth daily.  Dose: 10 mg     meclizine 25 mg tablet  Commonly known as: ANTIVERT  Take 25 mg by mouth daily as needed.  Dose: 25 mg     mupirocin 2 % ointment  Commonly known as: BACTROBAN  Apply 1 application. topically daily. Behind ears  Dose: 1 application.     pantoprazole 40 mg EC tablet  Commonly known as: PROTONIX  Take 1 tablet (40 mg total) by mouth daily Indications: stress ulcer prevention.  Dose: 40 mg     sildenafiL (pulm.hypertension) 20 mg tablet  Commonly known as: REVATIO  Take 1 tablet (20 mg total) by mouth 3 (three) times a day.  Dose: 20 mg               New, changed, or stopped medications from this admission:       Medication List        START taking these medications      acetaminophen 325 mg tablet  Commonly known as: TYLENOL  Take 2 tablets (650 mg total) by mouth every 6 (six) hours as needed for pain.  Dose: 650 mg     benzonatate 100 mg capsule  Commonly known as: TESSALON  Take 2 capsules (200 mg total) by mouth 3 (three) times a day as needed for cough for up to 10 days.  Dose: 200 mg     furosemide 20 mg tablet  Commonly known as: LASIX  Take 1 tablet (20 mg total) by mouth daily as needed (weight gain, shortness  of breath).  Dose: 20 mg     lidocaine 4 % adhesive patch,medicated topical patch  Commonly known as: ASPERCREME  Apply 1 patch topically daily as needed (pain). Remove & discard patch within 12 hours or as directed by prescriber.  Dose: 1 patch     sodium chloride 0.65 % nasal spray  Commonly known as: OCEAN  Administer 2 sprays into each nostril 2 (two) times a day as needed for congestion.  Dose: 2 spray            CHANGE how you take these medications      predniSONE 10 mg tablet  Commonly known as: DELTASONE  Start taking on: May 25, 2024  Take 4 tablets (40 mg total) by mouth daily for 2 days, THEN 3 tablets (30 mg total) daily for 3 days, THEN 2 tablets (20 mg total) daily for 3 days, THEN 1 tablet (10 mg total) daily for 3 days.  What changed:   medication strength  See the new instructions.            CONTINUE taking these medications      ACTEMRA 200 mg/10 mL (20 mg/mL) solution  Infuse 8 mg/kg into a venous catheter every 28 (twentyeight) days.  Dose: 8 mg/kg  Generic drug: tocilizumab     amLODIPine 5 mg tablet  Commonly known as: NORVASC  Take 10 mg by mouth every morning.  Dose: 10 mg     atorvastatin 40 mg tablet  Commonly known as: LIPITOR  Take 1 tablet (40 mg total) by mouth daily.  Dose: 40 mg     biotin 1 mg tablet  Take 1,000 mcg by mouth daily.  Dose: 1,000 mcg     cholecalciferol (vitamin D3) 1,000 unit (25 mcg) tablet  Take 1,000 Units by mouth daily.  Dose: 1,000 Units     colchicine 0.6 mg tablet  Commonly known as: COLCRYS  Take 0.5 tablets (0.3 mg total) by mouth daily.  Dose: 0.3 mg     cyanocobalamin (vitamin B-12) 1,000 mcg capsule  Take 1,000 mcg by mouth daily.  Dose: 1,000 mcg     docusate sodium 100 mg capsule  Commonly known as: COLACE  Take 100 mg by mouth daily as needed for constipation.  Dose: 100 mg     levothyroxine 100 mcg tablet  Commonly known as: SYNTHROID  Take 100 mcg by mouth daily.  Dose: 100 mcg     macitentan 10 mg tablet tablet  Commonly known as: OPSUMIT  Take 1  tablet (10 mg total) by mouth daily.  Dose: 10 mg     meclizine 25 mg tablet  Commonly known as: ANTIVERT  Take 25 mg by mouth daily as needed.  Dose: 25 mg     mupirocin 2 % ointment  Commonly known as: BACTROBAN  Apply 1 application. topically daily. Behind ears  Dose: 1 application.     pantoprazole 40 mg EC tablet  Commonly known as: PROTONIX  Take 1 tablet (40 mg total) by mouth daily Indications: stress ulcer prevention.  Dose: 40 mg     sildenafiL (pulm.hypertension) 20 mg tablet  Commonly known as: REVATIO  Take 1 tablet (20 mg total) by mouth 3 (three) times a day.  Dose: 20 mg              Non-Medication orders at discharge:   Instructions for after discharge       Follow-up with Provider:      MAGGIE Mensah MD   [phone]    [address]  [facility] [id number]       Follow-up with Provider:      Kai Wasserman MD   [phone]    [address]  [address]  [place] [id number]       Follow-up with Provider:      Floridalma Trevizo DO   [phone]    [address]  [address]  [place] [id number]       Follow-up with primary physician (PCP)      Follow-up with provider      Timothy Arvizu DO   [phone]    [address]  [facility]  [place] [address]       Post-Discharge Activity: Normal activity as tolerated.      Normal activity as tolerated.                      PROCEDURES / LABS / IMAGING      Operative Procedures  N/A    Other Procedures  N/A    Pertinent Labs  Lab Results   Component Value Date    WBC 16.22 (H) 05/24/2024    HGB 10.3 (L) 05/24/2024    HCT 33.9 (L) 05/24/2024    MCV 88.7 05/24/2024     05/24/2024     Lab Results   Component Value Date    GLUCOSE 87 05/24/2024    CALCIUM 9.5 05/24/2024     05/24/2024    K 4.4 05/24/2024    CO2 24 05/24/2024     05/24/2024    BUN 40 (H) 05/24/2024    CREATININE 1.1 05/24/2024         Pertinent Imaging  CT ANGIOGRAPHY CHEST PULMONARY EMBOLISM WITH IV  CONTRAST    Result Date: 5/22/2024  IMPRESSION: 1.  No evidence of acute pulmonary embolism. 2.  Multiple new perifissural left lower lobe nodules, suspicious for a neoplastic process. PET/CT and or tissue sampling is recommended. 3.  Chronic interstitial lung disease in an NSIP pattern, with overall slightly progressed appearance since March 2023. Pulmonary consultation is recommended. 4.  Stable cardiomegaly and large pericardial effusion. 5.  Mediastinal and supraclavicular adenopathy, similar to prior study, also indeterminate, which could be further evaluated at time of PET/CT. A preliminary report these findings provided by Agatha Ramos DO, May 21, 2024 10:45 PM In accordance with [place] Act 112,  the patient will receive a letter notifying them to follow up with their physician.    X-RAY CHEST 1 VIEW    Result Date: 5/21/2024  IMPRESSION: 1. Stable enlargement of the cardiac silhouette, in keeping with cardiomegaly and pericardial effusion noted on recent CT scan. 2. Diffuse hazy airspace opacity and interstitial prominence, suspicious for pulmonary edema. Other interstitial and airspace diseases would also have to be considered in the appropriate clinical situation. 3. Questioned vague 1.3 cm left upper lobe nodular opacity.     OUTPATIENT  FOLLOW-UP / REFERRALS / PENDING TESTS      Outpatient Follow-Up Appointments            In 3 weeks [facility]iology at [facility]    In 3 weeks Timothy Arvizu DO [facility] Cardiology at [facility]            Referrals  No orders of the defined types were placed in this encounter.      Test Results Pending at Discharge  Pending Inpatient Labs       Order Current Status    Blood Culture Blood, Venous Preliminary result    Blood Culture Blood, Venous Preliminary result            Important Issues to Address in Follow-Up  As above     DISCHARGE DISPOSITION      Disposition: Home  Health Care - Other    Code Status At Discharge: Full Code    Physician Order for Life-Sustaining Treatment Document Status        No documents found                     ATTENDING DOCUMENTATION  ALSO SEE ATTENDING ATTESTATION SECTION OF NOTE

## 2024-05-24 NOTE — PROGRESS NOTES
Followed up w/pt and daughter by the bedside for sc support. Appear calm, and pleasant. Expressed feeling better than when she came in. Expressed gratitude for support and requested for prayer. Offered support conversation, listening ear, reassurance and prayer. Will continue to follow for support.   Rev.Dr.Peter Luna, Spiritual Care Specialist [phone]

## 2024-05-24 NOTE — PROGRESS NOTES
Brief Update Note     Called patient daughter to provide brief medical update. Daughter confirmed that she has picked up patients Macitentan and actually brought the medication into the hospital for her mother. Will continue to provide medical updates.     Clarisa Lopez, DO

## 2024-05-24 NOTE — PROGRESS NOTES
Palliative Care Progress Note    This is Hospital Day: 4        Palliative care by specialist  Assessment & Plan  Ms. Felix is a 63 year old woman with PMH scleroderma with ILD, pulmonary HTN who presented with tachycardia and dyspnea.    -code status: remains full code, goals life prolonging - see initial consult note for details of conversation but I had recommended considering DNR code status  -I provided her with advance directive paperwork   -2 adult children would share decision making in the absence of HCPOA documentation  -has significant home support but would benefit from palliative bridge and/or palliative NP support  -will continue to follow     Debility  Assessment & Plan  -at baseline lives at home with family and/or aides 24 hours a day   -here with tachycardia and dyspnea   -high risk for further decline in the setting of frailty and medical conditions     Shortness of breath  Assessment & Plan  -in the setting if ILD and pulmonary HTN 2/2 scleroderma.   -she has tried morphine in the past which helps her breathe but makes her nauseous. She does not want to take any opioid medications unless things were really dire - I let her know we could have them available as needed but she did not need to take them unless she really needed them. Can order SL morphine 3 mg q4hr PRN dyspnea   -she requests Tessalon pearls to help with cough        Interval History (Subjective)    Source: chart review and the patient    Overnight she had an episode of chest pain and trouble breathing, this is resolved now. She is feeling ok this morning.    Current Medications:    acetaminophen, 650 mg, oral, q6h PRN    amLODIPine, 10 mg, oral, q AM    atorvastatin, 40 mg, oral, Daily (6p)    benzonatate, 200 mg, oral, 3x daily PRN    cholecalciferol (vitamin D3), 1,000 Units, oral, Daily    colchicine, 0.3 mg, oral, Daily    cyanocobalamin, 1,000 mcg, oral, Daily    glucose, 16-32 g of dextrose, oral, PRN **OR** dextrose,  15-30 g of dextrose, oral, PRN **OR** glucagon, 1 mg, intramuscular, PRN **OR** dextrose 50 % in water (D50), 25 mL, intravenous, PRN    docusate sodium, 100 mg, oral, Daily PRN    enoxaparin, 40 mg, subcutaneous, Daily (6p)    honey, , Topical, Daily    HYDROmorphone, 0.25 mg, intravenous, q6h PRN    ipratropium-albuteroL, 3 mL, nebulization, q6h SPENCER    levothyroxine, 100 mcg, oral, Daily (6:30a)    lidocaine, 1 patch, Topical, Daily    macitentan, 10 mg, oral, Daily    meclizine, 25 mg, oral, Daily PRN    pantoprazole, 40 mg, oral, Daily    [START ON 5/30/2024] predniSONE, 20 mg, oral, Daily    [START ON 5/27/2024] predniSONE, 30 mg, oral, Daily    predniSONE, 40 mg, oral, Daily    sildenafiL (pulm.hypertension), 20 mg, oral, TID    sodium chloride, 2 spray, Each Nostril, 2x daily PRN    Objective    Physical Exam:  Physical Exam  Vitals reviewed.   Constitutional:       General: She is not in acute distress.     Appearance: She is ill-appearing.   HENT:      Mouth/Throat:      Mouth: Mucous membranes are dry.   Eyes:      General: No scleral icterus.  Cardiovascular:      Rate and Rhythm: Normal rate.   Pulmonary:      Effort: Pulmonary effort is normal.   Skin:     General: Skin is dry.      Comments: Skin tightness c/w scleroderma    Neurological:      General: No focal deficit present.      Mental Status: She is alert and oriented to person, place, and time.   Psychiatric:         Mood and Affect: Mood normal.         Behavior: Behavior normal.         Vitals:  Vitals:    05/24/24 1115   BP: (!) 164/73   Pulse: 83   Resp: 16   Temp: 36.7 °C (98 °F)   SpO2: 95%       Laboratory Studies:    CBC Results         05/24/24 05/23/24 05/23/24     0436 2323 0448    WBC 16.22 16.11 15.09    RBC 3.82 3.66 3.53    HGB 10.3 9.9 9.6    HCT 33.9 31.8 31.9    MCV 88.7 86.9 90.4    MCH 27.0 27.0 27.2    MCHC 30.4 31.1 30.1     283 271          CMP Results         05/24/24 05/23/24 05/23/24     0436 2323 4616      137 138    K 4.4 4.5 4.3    Cl 104 105 105    CO2 24 24 23    Glucose 87 86 67    BUN 40 39 34    Creatinine 1.1 1.1 1.1    Calcium 9.5 9.2 8.7    Anion Gap 10 8 10    EGFR 56.6 56.6 56.6           Comment for EGFR at 0436 on 05/24/24: Calculation based on the Chronic Kidney Disease Epidemiology Collaboration (CKD-EPI) equation refit without adjustment for race.    Comment for EGFR at 2323 on 05/23/24: Calculation based on the Chronic Kidney Disease Epidemiology Collaboration (CKD-EPI) equation refit without adjustment for race.    Comment for EGFR at 0448 on 05/23/24: Calculation based on the Chronic Kidney Disease Epidemiology Collaboration (CKD-EPI) equation refit without adjustment for race.          Troponin I Results         05/24/24 05/23/24 05/21/24     0436 2323 1506    HS Troponin I 24.2 23.7 24.1          ABG Results         04/13/23     1949    Source Of Oxygen nc            I have reviewed the patient's pertinent labs to the time of note.  Significant abnormals are leukocytosis, anemia, BUN elevated .    Imaging and Other Studies:     Radiology Imaging    XR CHEST 1 VW    Narrative  CLINICAL HISTORY: Chest pain    COMMENT: A single portable frontal view of the chest is obtained.    Comparison    Impression  4/16/2024 chest x-ray and chest CT    Redemonstration of enlargement of the cardiac silhouette, consistent with  cardiomegaly and pericardial effusion noted on recent CT, and grossly similar to  prior. Diffuse hazy lung opacity and interstitial prominence which may reflect  pulmonary edema. Underlying airspace disease not excluded. Questioned faint  ill-defined 1.3 cm left upper lobe nodular opacity, possibly related to  overlapping structures; subtle pleural-parenchymal disease not excluded.    No pleural effusion or pneumothorax. Hilar and mediastinal contours appear  grossly stable. Generalized osseous demineralization.    --  IMPRESSION:  1. Stable enlargement of the cardiac silhouette, in  keeping with cardiomegaly  and pericardial effusion noted on recent CT scan.  2. Diffuse hazy airspace opacity and interstitial prominence, suspicious for  pulmonary edema. Other interstitial and airspace diseases would also have to be  considered in the appropriate clinical situation.  3. Questioned vague 1.3 cm left upper lobe nodular opacity.                                                                      Cardiac Imaging    TRANSTHORACIC ECHO (TTE) LIMITED 05/23/2024    Interpretation Summary  Mild increased wall thickness with normal left ventricular cavity and preserved systolic function. EF 65%. No wall motion abnormalities.  Normal right ventricular size with preserved systolic function.  Tiny IVC with >50% respiratory variation consistent with low-normal right sided filling pressures.  Small pericardial effusion, predominantly adjacent to right atrium. No evidence of hemodynamic compromise with normal respiratory variation and no chamber collapse. Prominent epicardial fat.  Compared with previous study of 4/10/24, small pericardial effusion persists.       I have independently reviewed the pertinent imaging to the time of note and agree with reported results.    I have independently reviewed the pertinent cardiac studies to the time of note and agree with reported results.    Becca Reyes MD  Office [phone]

## 2024-05-26 LAB
BACTERIA BLD CULT: NORMAL
BACTERIA BLD CULT: NORMAL

## 2024-06-14 ENCOUNTER — HOSPITAL ENCOUNTER (OUTPATIENT)
Dept: RADIOLOGY | Facility: CLINIC | Age: 64
Discharge: HOME | End: 2024-06-14
Attending: INTERNAL MEDICINE
Payer: COMMERCIAL

## 2024-06-14 VITALS — WEIGHT: 116 LBS | BODY MASS INDEX: 18.64 KG/M2 | HEIGHT: 66 IN

## 2024-06-14 DIAGNOSIS — R91.8 OTHER NONSPECIFIC ABNORMAL FINDING OF LUNG FIELD: ICD-10-CM

## 2024-06-14 DIAGNOSIS — R59.0 LOCALIZED ENLARGED LYMPH NODES: ICD-10-CM

## 2024-06-14 RX ORDER — FLUDEOXYGLUCOSE F18 300 MCI/ML
8.94 INJECTION INTRAVENOUS ONCE
Status: COMPLETED | OUTPATIENT
Start: 2024-06-14 | End: 2024-06-14

## 2024-06-14 RX ADMIN — FLUDEOXYGLUCOSE F18 8.94 MILLICURIE: 300 INJECTION INTRAVENOUS at 11:30

## 2024-06-15 NOTE — PROGRESS NOTES
I had the pleasure of seeing Arielle Felix (: 1960) in the [facility] Heart Failure and Pulmonary Hypertension clinic on 2024. She presents today with her care giver, Sara.     Ms. Felix is a 63 y.o. female with a past medical history of Pulmonary HTN, HTN, Raynaud's Disease, Cutaneous Systemic Scleroderma (dx ), ILD, PE (3/2023). She is a patient of Dr. Nguyễn. She was previously followed by Dr. Jos Valdez at [facility]. Echocardiogram from 2022 showed LVEF: 55-60%, mild aortic valve thickening, pulmonary artery systolic pressure: 52 mmHg and trivial pericardial effusion. LHC and RHC (separate occasions) over the last 5-10 years which showed normal hemodynamics and normal coronaries. She had a RHC 2020 showing top-normal right sided filling pressures with mild pulmonary hypertension, top-normal PVR and normal pulmonary capillary wedge pressure. The cardiac index was normal, seen below.     On 2022, she was in [facility] ER for chest pain. EKG: NSR without ischemic changes. hsTrop: 12->16, d-dimer: 0.56, CXR showed cardiomegaly and diffuse interstitial prominence.     Dr. Nguyễn ordered echocardiogram, which was completed on 2022 and showed estimated RVSP = 50-55 mmHg, normal-sized LV with normal LV systolic function. Estimated EF 55- 60%. Wall motion grossly normal, mild concentric left ventricular hypertrophy, grade I LV diastolic dysfunction, probably normal RV size and function.    At her initial visit on 10/11/2022, she reported that she felt very short of breath with minimal activity most of the time. She reports that this started about 1 year prior and had progressively gotten worse. She described PND and bendopnea. She reported that she experienced ankle swelling, worsened over the past year and usually worse towards the end of the night. She also reported some swelling in her abdomen. She reported daily palpations. I recommended a RHC which was done  11/28/2022 and showed: Normal right sided filling pressures. Mildly elevated left sided filling pressures. Precapillary pulmonary hypertension with mean PA 36 mmHg.    She was hospitalized on 3/19/2023 at [facility] for PE diagnosed on V/Q scan. She reports that she had presented with left arm pain and heaviness. She states that she was started on Apixaban. One week after her last office visit (4/6/2023), [place] [facility] [place], epistaxis and hemoptysis. Echocardiogram showed: left ventricular cavity size normal with mild left ventricular hypertrophy and preserved systolic function. Estimated EF 65%. Chest CTA showed no evidence of PE; Apixaban was discontinued.     She had a repeat echocardiogram (6/2023) which showed: Normal left ventricular cavity size and mild concentric left ventricular hypertrophy. Normal systolic function. EF: 60%. Normal right ventricular structure and function. Mild tricuspid valve regurgitation with estimated RVSP: 55 mmHg. Compared to previous study from 4/14/2023, estimated right ventricular systolic pressure were higher.    At her last office visit on 7/25/2023, she reported that she was not sure if she received/started the Macitentan. She stated at the time we initiated it, her Pulmonologist also started her on a new medication (Mycophenolate), and she had significant GI intolerance with this. It was trialed at various doses but ultimately stopped. At that visit, I asked her to check if she had Macitentan and if she did to start it and monitor her SpO2.    She underwent V/Q scan in September 2023, which showed intermediate-to-high probability of pulmonary embolic disease, as a result she completed CTA Chest PE which showed: no evidence for filling defect or vessel cutoff to suggest pulmonary embolism. Enlargement of the pulmonary arteries compatible with pulmonary arterial hypertension was seen.    She reports she started Macitentan around August or  September 2023. She stated that she had not been checking her SpO2. She reports she did not notice any difference since starting it. She reported she had been feeling more shortness of breath at times with ADLs.     She was last seen in the office in February 2024.    She presented to [facility] on 3/3/2024 with chest pressure, palpitations and nausea. She was noted to have hypoxia and tachypnea at presentation. She was found to have moderate-to-large pericardial effusion without tamponade. She was started on Colchicine 0.6 mg BID on 3/4/2024 and when she did not improve symptomatically, Prednisone 20 mg daily was added on 3/5/2024. On 3/7/2024, she developed multiple episodes of diarrhea and Colchicine dose was decreased to 0.3 mg. Coronary CTA, done as part of the ischemic workup, showed moderate 2 vessel CAD. She was recommended to begin Atorvastatin 20 mg daily and Clopidogrel 75 mg daily. She initially refused those two medications, but eventually agreed to take Atorvastatin.      She was discharged on Colchicine 0.3 mg for at least 3 months and Prednisone 20 mg for a total two week course until 3/19/2024, then decrease the dose to 10 mg daily for 2 weeks until 4/2/2024, then decrease dose to 5 mg daily for 2 weeks until 4/16/2024. She reports she tapered off Prednisone as instructed and has continued to take Colchicine as prescribed. She completed an echocardiogram (4/10/2024) which showed: Small-to-moderate sized, circumferential pericardial effusion. No echocardiographic evidence for cardiac tamponade. Compared to previous study from 3/4/2024, no significant change. Pericardial effusion size was similar, estimated right atrial pressure lower.      She presented to [facility] (4/16/2024) with increased pleuritic chest pain. On presentation to the ER, she was noted to be hypertensive and tachycardic. Labs showed mildly elevated troponin, elevated BNP and leukocytosis. CT angiography of the chest did not show evidence of PE.  "It was read as large pericardial effusion, evidence of emphysema, evidence of pulmonary hypertension, 16 mm left lobe lung mass.       She presented again to [facility] on 5/21/2024 at the request of her Rheumatologist. During her visit there, she was noted to be tachycardic and with some shortness of breath. Her chest pain was improved from her prior admission. On arrival, O2 saturations were normal. ECG showed: Sinus Tachycardia (HR: 125 bpm). BNP >300, hsTrop 32 ->24. POCUS in the emergency department showed evidence of pericardial effusion with no tamponade physiology. CTA Chest did not show evidence of pulmonary embolism. There were bilateral changes radiographically concerning for volume overload. She received Methylpredinsolone 125 mg in ED. Of note, she was on Prednisone 10 mg daily at home. She was given IV diuresis. She was discharged on Furosemide 20 mg PO PRN daily for fluid weight gain / swelling.       She reports that she feels out-of-breath, and that it can be \"anything that leads to it\". She states that it can even occur at rest without provocation. She reports significant back pain and that her persistent chest pain has been worse. She notes some swelling in her feet and stomach. She describes usual vertigo symptoms. She describes one fall that she had at home.     She states that she had PET scan for her lung nodule, which had concerning findings. She is planned for biopsy by Dr. Rodriguez as coordinated by Dr. Wasserman.     ---    Rheumatology: Dr. Trevizo  Pulmonology: Dr. Wasserman    Past Medical / Surgical History:  Pulmonary HTN, HTN, Raynaud's Disease, Cutaneous Systemic Scleroderma (dx 1998), ILD, PE (3/2023), Follicular Carcinoma of Thyroid, Tenosynovitis, de Quervain, h/o Hernia Repair, h/o Appendicitis, h/o Digit Amputation, H/o Total Thyroidectomy (Dr. Pacheco at [facility]; 4/2013)    Family & Social History: She is , she has two children. She has worked as an .     Tobacco: former " "2005  EtOH: never   Illicits: never     Her brother has CAD with stent  1st cousin with SLE  Both parents had \"heart problems\"    Allergies:   Allergies   Allergen Reactions    Aspirin Shortness of breath     Other reaction(s): Unknown  \"stop breathing\"      Ibuprofen Shortness of breath     Stop breathing    Iodine Shortness of breath     Other reaction(s): Unknown    Iodine And Iodide Containing Products Shortness of breath     ? rash    Penicillins Shortness of breath     Other reaction(s): Unknown    Sulfa (Sulfonamide Antibiotics) Angioedema    Clindamycin     Hydrochlorothiazide      Other reaction(s): Abdominal Pain   Slow heart rate    Oxycodone      Other reaction(s): Vomiting    Sulfamethoxazole-Trimethoprim      Other reaction(s): Vomiting, Weakness     Latex Rash       Medications:   Current Outpatient Medications   Medication Sig Dispense Refill    acetaminophen (TYLENOL) 325 mg tablet Take 2 tablets (650 mg total) by mouth every 6 (six) hours as needed for pain.      amLODIPine (NORVASC) 5 mg tablet Take 10 mg by mouth every morning.      atorvastatin (LIPITOR) 40 mg tablet Take 1 tablet (40 mg total) by mouth daily. 30 tablet 2    biotin 1 mg tablet Take 1,000 mcg by mouth daily.      cholecalciferol, vitamin D3, 1,000 unit (25 mcg) tablet Take 1,000 Units by mouth daily.      cyanocobalamin, vitamin B-12, 1,000 mcg capsule Take 1,000 mcg by mouth daily.      docusate sodium (COLACE) 100 mg capsule Take 100 mg by mouth daily as needed for constipation.      levothyroxine (SYNTHROID) 100 mcg tablet Take 100 mcg by mouth daily.      lidocaine (ASPERCREME) 4 % adhesive patch,medicated topical patch Apply 1 patch topically daily as needed (pain). Remove & discard patch within 12 hours or as directed by prescriber. 15 patch 0    macitentan (OPSUMIT) 10 mg tablet tablet Take 1 tablet (10 mg total) by mouth daily. 30 tablet 11    meclizine (ANTIVERT) 25 mg tablet Take 25 mg by mouth daily as needed.      " mupirocin (BACTROBAN) 2 % ointment Apply 1 application. topically daily. Behind ears      pantoprazole (PROTONIX) 40 mg EC tablet Take 1 tablet (40 mg total) by mouth daily Indications: stress ulcer prevention. 30 tablet 1    predniSONE (DELTASONE) 5 mg tablet Take 5 mg by mouth daily. Once a day      sildenafiL, pulm.hypertension, (REVATIO) 20 mg tablet Take 1 tablet (20 mg total) by mouth 3 (three) times a day. 270 tablet 3    sodium chloride (OCEAN) 0.65 % nasal spray Administer 2 sprays into each nostril 2 (two) times a day as needed for congestion. 15 mL 0    tocilizumab (ACTEMRA) 200 mg/10 mL (20 mg/mL) solution Infuse 8 mg/kg into a venous catheter every 28 (twentyeight) days.       No current facility-administered medications for this visit.       Review of Systems:   All other systems reviewed and are negative except as per HPI.    Physical Exam:     Wt Readings from Last 10 Encounters:   06/20/24 51.7 kg (114 lb)   06/14/24 52.6 kg (116 lb)   05/23/24 52.4 kg (115 lb 9.6 oz)   04/17/24 53.5 kg (118 lb)   04/10/24 44.5 kg (98 lb)   03/16/24 44.6 kg (98 lb 5.2 oz)   02/01/24 47.6 kg (105 lb)   07/25/23 50.8 kg (112 lb)   07/25/23 50.8 kg (112 lb)   06/21/23 51.3 kg (113 lb)       BP Readings from Last 5 Encounters:   06/20/24 130/72   05/24/24 (!) 164/73   04/19/24 (!) 156/71   04/10/24 128/72   03/16/24 (!) 136/6       Vitals:    06/20/24 1111   BP: 130/72   Pulse: (!) 114   SpO2: 97%         Body mass index is 18.4 kg/m².  Body surface area is 1.55 meters squared.    Constitutional: NAD, AAOx3  HENT: Normocephalic, atraumatic head, sclerae anicteric, no cervical lymphadenopathy, trachea midline  Cardiovascular: RRR, no murmurs, rubs or gallops, JVP: 7-8 cm H2O   cm H2O, no carotid bruits.  Respiratory: CTA bilateral lung fields, no wheezes, rales or rhonchi  GI: soft, non-tender/non-distended  Musculoskeletal / Extremities: trace peripheral edema, +2 pulses bilateral radial, DP/PT arteries, skin tightening  on face and fingertips  Skin: no rashes  Neuro / Psych: no focal deficits, CNII-XII grossly intact, appropriate, cooperative    Diagnostic Data:     Component      Latest Ref [facility] 4/18/2024 5/24/2024   WBC      3.80 - 10.50 K/uL 8.40  16.22 (H)    RBC      3.93 - 5.22 M/uL 3.54 (L)  3.82 (L)    Hemoglobin      11.8 - 15.7 g/dL 9.8 (L)  10.3 (L)    Hematocrit      35.0 - 45.0 % 32.8 (L)  33.9 (L)    Platelets      150 - 369 K/uL 254  289       Component      Latest Ref [facility] 5/24/2024   Magnesium      1.8 - 2.5 mg/dL 2.1      Component      Latest [facility] 4/13/2023 3/7/2024 5/22/2024   TSH      0.34 - 5.60 mIU/L 4.71  0.28 (L)  1.03      Component      Latest [facility] 5/24/2024   Sodium      136 - 145 mEQ/L 138    Potassium, Bld      3.5 - 5.1 mEQ/L 4.4    Chloride      98 - 107 mEQ/L 104    CO2      21 - 31 mEQ/L 24    BUN      7 - 25 mg/dL 40 (H)    Creatinine      0.6 - 1.2 mg/dL 1.1    Glucose      70 - 99 mg/dL 87    Calcium      8.6 - 10.3 mg/dL 9.5    eGFR      >=60.0 mL/min/1.73m*2 56.6 (L)    Anion Gap      3 - 15 mEQ/L 10      Component      Latest Ref [facility] 4/13/2023   Hemoglobin A1C      <5.7 % 4.4        Component      Latest [facility] 4/14/2023   Triglycerides      30 - 149 mg/dL 44    Cholesterol      <=200 mg/dL 194    HDL      >=55 mg/dL 49 (L)    LDL Calculated      <=100 mg/dL 136 (H)    Non-HDL, Calculated      mg/dL 145    RISK      <=5.0  4.0       Component      Latest Ref [facility] 4/13/2023   High Sens Troponin I      <15.0 pg/mL 23.0 (H)       Component      Latest Ref [facility] 4/14/2023   Ferritin      11 - 250 ng/mL 75      Component      Latest [facility] 4/14/2023   Iron      35 - 150 ug/dL 29 (L)    TIBC      270 - 460 ug/dL 245 (L)    UIBC      180 - 360 ug/dL 216    Iron Saturation      15 - 45 % 12 (L)        Component      Latest Ref Rng 6/27/2022 4/13/2023   BNP      <=100 pg/mL 111 (H)  105 (H)                          ---    ECG (6/20/2024, personally reviewed): Sinus Tachycardia (HR: 114 bpm), ANA,  old anterior infarct pattern, non-specific ST/T-wave abnormalities  ---    PET/CT Skull-to-Thigh (6/4/2024):   An approximately 1.6 cm pulmonary nodule at the medial left lung base is  significantly FDG avid at max SUV 8.5.  This is nonspecific and can be seen in  the setting of benign pathology however malignancy is a concern.     There is more mild to moderate nonspecific uptake localizing to lita-fissural  nodules particularly on the left.     There is  moderate nonspecific left hilar and mediastinal dawson uptake.     No additional suspicious FDG avid findings appreciated.     CTA Chest (5/21/2024):   IMPRESSION:  1.  No evidence of acute pulmonary embolism.  2.  Multiple new perifissural left lower lobe nodules, suspicious for a  neoplastic process. PET/CT and or tissue sampling is recommended.  3.  Chronic interstitial lung disease in an NSIP pattern, with overall slightly  progressed appearance since March 2023. Pulmonary consultation is recommended.  4.  Stable cardiomegaly and large pericardial effusion.  5.  Mediastinal and supraclavicular adenopathy, similar to prior study, also  indeterminate, which could be further evaluated at time of PET/CT.     CTA Chest (4/16/2024):   IMPRESSION:  1. No evidence of pulmonary embolism.  2. Stable cardiomegaly and large pericardial effusion. Cardiology consultation  suggested.  3. Emphysema with bilateral lower lobe groundglass opacity and bronchiectasis in  an NSIP pattern, which can be seen with scleroderma. Pulmonary consultation  suggested.  4. Stable 16 mm left lower lobe masslike density which may represent  atelectasis, lung cancer or lymphoma. When clinically appropriate PET/CT  suggested.  5. Stable prominence of the main pulmonary artery which can be seen with  pulmonary hypertension     TTE (5/23/2024, personally reviewed):  Mild increased wall thickness with normal left ventricular cavity and preserved systolic function. EF 65%. No wall motion  abnormalities.   Normal right ventricular size with preserved systolic function.   Tiny IVC with >50% respiratory variation consistent with low-normal right sided filling pressures.   Small pericardial effusion, predominantly adjacent to right atrium. No evidence of hemodynamic compromise with normal respiratory variation and no chamber collapse. Prominent epicardial fat.   Compared with previous study of 4/10/24, small pericardial effusion persists.      TTE (4/10/2024, personally reviewed):  1. Normal left ventricular cavity size with mild concentric left ventricular hypertrophy. Normal systolic function. EF: 60%. No regional wall motion abnormalities. Cannot determine diastolic function. Global longitudinal strain not reported due to technical limitations of study.   2. Aortic valve has three cusps. Trace aortic regurgitation. Aortic valve and root sclerosis.  3. Thickened mitral valve leaflets. Trace mitral valve regurgitation. Normal left atrial size.   4. Normal right ventricular structure and function. Trace tricuspid valve regurgitation with estimated RVSP: 51 mmHg (assuming right atrial pressure of 3 mmHg). Normal right atrial size. Pulmonic valve structurally normal. Trace pulmonic valve regurgitation. Pulsed wave Doppler of the RVOT systolic profile shows decreased acceleration times and geometry consistent with mildly elevated PVR.  5. IVC size is normal with > 50% inspiratory collapse consistent with normal right atrial pressure. Small-to-moderate sized, circumferential pericardial effusion. No echocardiographic evidence for cardiac tamponade.   6. Compared to previous study from 3/4/2024, no significant change. Pericardial effusion size is similar, estimated right atrial pressure lower.           CTA Abd / Pelvis (3/13/2024, personally reviewed):   There is dilatation of the of proximal/mid small bowel with relatively abrupt  transition left hemiabdomen, likely obstruction. Questionable lesion at  the  transition point. This could be better evaluated with CT or MRI enterography.     Aorta and major branches patent. Superior mesenteric artery patent without  significant stenosis.  Suspect moderate stenosis of the origins of the of renal arteries bilaterally.     Large pericardial effusion, no change.     Patchy opacities lower lungs, similar to prior.      Coronary CTA (3/11/2024):   1. Two-vessel coronary artery disease as above that is moderate in severity.  CT  FFR is normal..  2. There is mitral annular calcification and aortic sclerosis.  The right atrium  is enlarged.  There is left ventricular hypertrophy.  There is a moderate to  large circumferential pericardial effusion.  3. Normal left ventricular wall motion and systolic function.  4. Coronary calcium score 313, 96th percentile     TTE (3/4/2024, personally reviewed):   Normal-sized left ventricle. Mild left ventricular hypertrophy. Preserved systolic function. LVEF 60%. No regional wall motion abnormalities. Grade II diastolic dysfunction.  Normal global longitudinal strain (-20%).  The aortic valve is not well seen.  Cannot determine the number of cusps.  Sclerotic leaflets.  No aortic stenosis.  Trace aortic insufficiency.  Normal sized aortic root.  The visualized portion of the ascending aorta is normal in size.  The mitral valve leaflets are thickened. Mild mitral annular calcification. Trace mitral regurgitation.   Mildly dilated left atrium.  Normal-sized right ventricle. Normal right ventricular systolic function.  Thickened tricuspid valve. There is mild tricuspid regurgitation with estimated RVSP of 46 mmHg.   Pulmonic valve is not well visualized. Trace pulmonic regurgitation.   Mildly dilated right atrium.  IVC is enlarged with <50% respiratory variation consistent with elevated right atrial filling pressure (at least 15 mmHg).   Moderate, circumferential, circumferential pericardial effusion.  The largest pocket is located  inferolaterally.  Elevated right atrial pressure.  No other clear echocardiographic features of increased pericardial pressure.  Correlate clinically.   Compared to previous TTE from 6/21/2023, pericardial effusion now moderate in size.  Right atrial pressure now elevated.         CTA Chest (3/3/2024):   IMPRESSION:  1.  No evidence of acute pulmonary embolism to the level of the segmental  branches.  2.  Moderate to large pericardial effusion measuring higher than simple fluid  density.  3.  Lower lobe lung field predominant interstitial thickening with some relative  subpleural sparing, consistent with a chronic interstitial lung disease,  unchanged from the prior study.  4.  Continued bilateral axillary, mediastinal, and bilateral hilar  lymphadenopathy, not definitely changed from the prior study, possibly related  to the patient's sarcoid.  5.  Emphysema.      CTA Chest PE (10/4/2023):  IMPRESSION:  1. No evidence for filling defect or vessel cutoff to suggest pulmonary  embolism.  Enlargement of the pulmonary arteries compatible with pulmonary  arterial hypertension.  2. Changes throughout the lungs as described above which can be seen with  systemic sclerosis/scleroderma.  Underlying pneumonia the lung bases cannot be  entirely excluded in the proper clinical setting.  3.  Additional stable findings as described above.        VQ (9/12/2023):  IMPRESSION:  Intermediate to high probability of pulmonary embolic disease.     6MWT (7/25/2023, on Sildenafil 20 mg TID):        TTE (6/21/2023, personally reviewed):  1. Normal left ventricular cavity size and mild concentric left ventricular hypertrophy. Normal systolic function. EF: 60%. No regional wall motion abnormalities. Grade I diastolic dysfunction.   2. Aortic valve cusps not well visualized. Trace aortic regurgitation. Aortic valve and root sclerosis.  3. Thickened mitral valve leaflets. Mild mitral annular calcification. Trace mitral regurgitation. Mildly  dilated left atrial size.   4. Normal right ventricular structure and function. Mild tricuspid valve regurgitation with estimated RVSP: 55 mmHg (assuming right atrial pressure of 3 mmHg). Normal right atrial size. Pulmonic valve not well visualized. Trace pulmonic valve regurgitation. Pulsed wave Doppler of the RVOT systolic profile show decreased acceleration times ( msec) and late systolic notching consistent with elevated PVR.   5. IVC size is normal with > 50% inspiratory collapse consistent with normal right atrial pressure. Small pericardial effusion without echocardiographic evidence of tamponade.  6. Compared to previous study from 4/14/2023, estimated right ventricular systolic pressure is higher.        TTE (4/14/2023, personally reviewed):   The left ventricular cavity size is normal with mild left ventricular hypertrophy and preserved systolic function. Estimated EF 65%. No regional wall motion abnormalities. Grade I diastolic dysfunction.     The aortic valve is tricuspid. Aortic valve sclerosis. Trace aortic regurgitation.      The visualized portions of the aortic root and ascending aorta are normal in size.     The mitral valve is mildly thickened. Mild mitral annular calcification. Trace mitral regurgitation.       The left atrium is severely dilated.      The right ventricle is normal in size with preserved systolic function     The pulmonic valve is not well seen.     The tricuspid valve is normal in appearance. mild tricuspid regurgitation with an estimated RVSP of 34 mmHg.       Right atrium is normal in size.     The IVC is small and collapses > 50% with inspiration consistent with normal right atrial pressures.     Small pericardial effusion, mostly posterior.       Compared to previous echocardiogram from 8/22/2022, there is no significant change        TTE (11/28/2022, personally reviewed):   Normal right- and mildly elevated left-sided filling pressures (RA: 3 mmHg, PCWP: 13  mmHg)  Elevated pulmonary artery pressures (60/22(35 mmHg)) in the setting of PCWP: 13 mmHg.   Normal cardiac output and index (CO: 5.1 L/min, CI: 3.2 L/min/m2)  PVR: 4.3 Wood units. SVR: 1840 dynes/sec/cm5      TTE (8/22/2022, personally reviewed):  Normal-sized LV. Normal LV systolic function. Estimated EF 55- 60%. Wall motion appears grossly normal. Mild concentric left ventricular hypertrophy. Grade I LV diastolic dysfunction.  Pulmonic valve not well visualized. Grossly normal pulmonic valve structure. Trace pulmonic valve regurgitation. No pulmonic valve stenosis.  Aortic valve not well visualized. Aortic valve not well seen but likely trileaflet. Focal aortic valve calcification present. Sclerotic aortic valve leaflets. Mild aortic valve regurgitation. No aortic valve stenosis.  RV not well visualized. Normal-sized RV. Normal RV systolic function.  Normal-sized RA.  There is a small loculated pericardial effusion anterior to the right atrium. No cardiac tamponade.  Sclerotic mitral valve. Mitral valve calcification of the anterior leaflet present.  Trace mitral valve regurgitation.  No significant mitral valve stenosis.  Normal-sized IVC. IVC demonstrates normal respiratory collapse.  Tricuspid valve structure is grossly normal. Mild to moderate tricuspid valve regurgitation.  Estimated RVSP = 50-55 mmHg.  Mildly dilated LA.  No previous echocardiogram available for comparison.     TTE (4/21/2022, outside study):  This result has an attachment that is not available.     A complete transthoracic echocardiogram (including 2D, color flow   Doppler, spectral Doppler and M-mode imaging) was performed using the   standard protocol. The study quality was adequate.     The left ventricle is normal in size. There is moderately increased   wall thickness consistent with moderate concentric hypertrophy.   Endocardium is incompletely visualized, but no regional wall motion   abnormalities are noted in the visualized  segments. Normal left   ventricular ejection fraction. The left ventricular ejection fraction by   visual estimate is 55% to 60%.     The right ventricle is normal in size. There is normal function of the   right ventricle.     The left atrium is normal in size. Left atrial volume is 58 mL.     The right atrial volume measures 52 mL. The right atrial volume index   measures 34 mL/m2.     There is trace mitral regurgitation. There is no mitral stenosis.     The aortic valve is trileaflet. There is mild aortic valve thickening.   There is no aortic stenosis. There is trace aortic regurgitation.     There is mild to moderate tricuspid regurgitation. Pulmonary artery   systolic pressure measures 52 mmHg. The pulmonary artery systolic pressure   is moderately elevated.     The aorta measures 3.2 cm at the sinus of Valsalva. The proximal   segment of the ascending aorta is mildly enlarged. The proximal segment of   the ascending aorta measures 3.4 cm. The proximal segment of the ascending   aorta measures 2.2 cm/m2, indexed for body surface area.     Trivial pericardial effusion present. There is no echocardiographic   evidence of cardiac tamponade.     The inferior vena cava is normal in size (diameter <21 mm) and   decreases >50% in size with inspiration, suggesting a normal right atrial   pressure of 3 mmHg (range 0-5 mm Hg).     Compared to the prior study of 3/13/2020, there are no significant   changes.        PFT (3/23/2022):      PFT (7/14/2021):      CT Chest (5/2/2021, outside study):  CLINICAL INFORMATION: Systemic sclerosis. Interstitial lung disease. Groundglass nodule     PROCEDURE: Unenhanced CT of the chest was performed using thin section reconstructions. Images were obtained on inspiration and expiration.     COMPARISON: June and August 2020     FINDINGS:     LUNGS, PLEURA:     Groundglass opacities with reticulation with subpleural sparing in the lower lobes, without honeycombing or architectural  distortion, unchanged.     Right upper lobe groundglass nodule, image 114 of series 3, 8 x 11 mm, previously 6 mm.     Expiratory images are of diagnostic quality and do not demonstrate evidence of clinically significant air trapping.     CARDIOVASCULAR, MEDIASTINUM, THYROID: Coronary calcifications. Trace pericardial effusion.     LYMPH NODES: Subcentimeter mediastinal lymph nodes, unchanged. Unchanged axillary lymph nodes.     SKELETON, CHEST WALL: No destructive bone lesion     UPPER ABDOMEN: Patulous esophagus. 3 mm right renal stone.       RHC (9/02/2020):  RA   8 mmHg   RV   40/5 mmHg   PA (mean)  44/16 (25) mmHg   PCWP   12 mmHg   CO   4.65 lpm (Thermodilution)   CI   2.65 lpm/m-squared   SVI    33 ml/beat/m-squared (lower limit normal 29)   PVR   2.8 mmHg/l/min (Wood units)   SVR   2064 dyne-sec-cm-5         PFT (3/18/2020):      TTE (3/13/2020, outside study):  · The study quality was good.   · The left ventricle is normal in size. Top normal left ventricular wall   thickness. There are no segmental wall motion abnormalities. The left   ventricular ejection fraction is 55-60% by visual estimate and 3D   echocardiography. Normal left ventricular ejection fraction.   · There is grade I (mild) LV diastolic dysfunction.   · The right ventricle is normal in size. There is normal function of the   right ventricle.   · The right atrium is mildly dilated.   · There is mild mitral valve anterior leaflet thickening. There is mild   mitral valve leaflet calcification. There is flattening of the mitral   leaflets without raphael prolapse. There is trace mitral regurgitation.   · The aortic valve is trileaflet. There is mild thickening of the aortic   valve. There is mild calcification of the aortic valve. There is no aortic   /stenosis. There is trace aortic regurgitation.   · There is mild tricuspid valve leaflet thickening. There is mild to   moderate tricuspid regurgitation. The pulmonary artery systolic pressure   is  moderately elevated. Pulmonary artery systolic pressure measures 50   mmHg. There is mid-systolic notching of the RVOT VTI consistent with   elevated pulmonary vascular resistance.   · The inferior vena cava is normal in size (diameter <21 mm) and decreases   >50% in size with inspiration, suggesting a normal right atrial pressure   of 3 mmHg (range 0-5 mm Hg).   · Trivial loculated pericardial effusion present adjacent to the right   ventricle and adjacent to the right atrium.          Assessment / Plan:     1. Pulmonary Hypertension (WHO Group I, NYHA/WHO FC III): Occurring in the background of Systemic Scleroderma with ILD. August 2022 TTE showed normal RV size and function, but progressive exertional decline, worsened DLCO and resting tachycardia suggest there may be progression of her pulmonary vascular disease and limitation of cardiac output. This was proven with 11/2022 RHC showing mean PA 35 mmHg (with PCWP 13 mmHg) and PVR 4.3 Wood units. Recent TTEs have shown appropriate RV:PA coupling with normal RV size and systolic function.     Intravascularly, she is euvolemic on exam today. She will continue Sildenafil 20 mg TID, and Macitentan 10 mg daily for combination therapy.     She was unable to complete 6-minute walk test today due to instability.     2. HTN: Controlled. She is continued on Amlodipine.     3. Systemic Scleroderma / Raynaud's Disease: Per Rheumatology (Dr. Trevizo).      4. ILD: Per Pulmonology and Rheumatology (Dr. Trevizo). She has not tolerated trials of Mycophenolate.     5. PE: She has had elevated probability on V/Q scans, but CTA Chest has consistently not shown evidence for acute or chronic thromboembolism. Apixaban has been discontinued.     6. CAD: Two-vessel non-obstructive disease on March 2024 CCTA. She is continued on Atorvastatin 40 mg daily.     7. Pre-procedural Cardiovascular Evaluation and Risk Stratification: She is proposed for biopsy of FDG-avid LLL lung mass with   "Michael. There is recent CCTA showing that she does not have obstructive CAD. Her symptoms are at baseline, and she is compensated on exam. She can proceed without further cardiovascular testing at elevated, but acceptable risk. She should continue uninterrupted Sildenafil and Macitentan lita-procedurally. Would plan to administer mid-day Sildenafil dose when she is due for it.     Thank you for allowing me to participate in the care of your patient. I would like to see her in the office in 3 months.     Please do not hesitate to contact me with any questions or issues.     Sincerely,     Timothy Arvizu, DO  Cardiology / Heart Failure / Pulmonary Hypertension    [email or web address]    [phone]    [facility]  [facility]  [address]   [place] [id number]      Counseling and coordination of care consisted of more than 50% of this complex medical evaluation; numerous diagnostic and management options were considered and testing results were reviewed and discussed with the patient as best as able, during this encounter; the patient has multiple medical problems and is on numerous medications; the patient has a high risk of complications due to their cardiac disease, and all these resulted in the assigned \"complex medical decision-making\" characterization and coding of this patient encounter.  I spent  41 minutes on this date of service performing the following activities: obtaining history, performing examination, entering orders, documenting, preparing for visit, obtaining / reviewing records, providing counseling and education and independently reviewing study/studies.  "

## 2024-06-20 ENCOUNTER — OFFICE VISIT (OUTPATIENT)
Dept: CARDIOLOGY | Facility: CLINIC | Age: 64
End: 2024-06-20
Payer: COMMERCIAL

## 2024-06-20 VITALS
SYSTOLIC BLOOD PRESSURE: 130 MMHG | DIASTOLIC BLOOD PRESSURE: 72 MMHG | BODY MASS INDEX: 18.32 KG/M2 | HEART RATE: 114 BPM | WEIGHT: 114 LBS | OXYGEN SATURATION: 97 % | HEIGHT: 66 IN

## 2024-06-20 DIAGNOSIS — I10 ESSENTIAL (PRIMARY) HYPERTENSION: ICD-10-CM

## 2024-06-20 DIAGNOSIS — I25.10 CORONARY ARTERY DISEASE INVOLVING NATIVE CORONARY ARTERY OF NATIVE HEART WITHOUT ANGINA PECTORIS: ICD-10-CM

## 2024-06-20 DIAGNOSIS — I51.9 RIGHT VENTRICULAR DYSFUNCTION: ICD-10-CM

## 2024-06-20 DIAGNOSIS — J84.9 INTERSTITIAL LUNG DISEASE (CMS/HCC): ICD-10-CM

## 2024-06-20 DIAGNOSIS — I27.20 PULMONARY HYPERTENSION (CMS/HCC): Primary | ICD-10-CM

## 2024-06-20 DIAGNOSIS — R91.8 LUNG MASS: ICD-10-CM

## 2024-06-20 PROCEDURE — 93000 ELECTROCARDIOGRAM COMPLETE: CPT | Performed by: INTERNAL MEDICINE

## 2024-06-20 PROCEDURE — 3075F SYST BP GE 130 - 139MM HG: CPT | Performed by: INTERNAL MEDICINE

## 2024-06-20 PROCEDURE — 3078F DIAST BP <80 MM HG: CPT | Performed by: INTERNAL MEDICINE

## 2024-06-20 PROCEDURE — 99215 OFFICE O/P EST HI 40 MIN: CPT | Performed by: INTERNAL MEDICINE

## 2024-06-20 PROCEDURE — 3008F BODY MASS INDEX DOCD: CPT | Performed by: INTERNAL MEDICINE

## 2024-06-20 RX ORDER — PREDNISONE 5 MG/1
5 TABLET ORAL DAILY
COMMUNITY
End: 2024-07-19 | Stop reason: ALTCHOICE

## 2024-06-20 RX ORDER — LIDOCAINE 560 MG/1
1 PATCH PERCUTANEOUS; TOPICAL; TRANSDERMAL DAILY PRN
Qty: 15 PATCH | Refills: 0 | Status: SHIPPED | OUTPATIENT
Start: 2024-06-20 | End: 2024-08-05 | Stop reason: SDUPTHER

## 2024-06-20 RX ORDER — ATORVASTATIN CALCIUM 40 MG/1
40 TABLET, FILM COATED ORAL
Qty: 30 TABLET | Refills: 2 | Status: SHIPPED | OUTPATIENT
Start: 2024-06-20 | End: 2024-10-01 | Stop reason: SDUPTHER

## 2024-06-20 NOTE — LETTER
2024     Sara Simmons MD  [id number] [facility] [address] [place] [id number]    Patient: Arielle Felix  YOB: 1960  Date of Visit: 2024      Dear Dr. Simmons:    Thank you for referring Arielle Felix to me for evaluation. Below are my notes for this consultation.    If you have questions, please do not hesitate to call me. I look forward to following your patient along with you.         Sincerely,        Timothy Arvizu,         CC: DO Kai Roque MD Thomas J Meyer, MD Clark, John, DO  2024  1:59 PM  Signed  I had the pleasure of seeing Arielle Fleix (: 1960) in the [facility] Heart Failure and Pulmonary Hypertension clinic on 2024. She presents today with her care giver, Sara.     Ms. Felix is a 63 y.o. female with a past medical history of Pulmonary HTN, HTN, Raynaud's Disease, Cutaneous Systemic Scleroderma (dx ), ILD, PE (3/2023). She is a patient of Dr. Nguyễn. She was previously followed by Dr. Jos Valdez at [facility]. Echocardiogram from 2022 showed LVEF: 55-60%, mild aortic valve thickening, pulmonary artery systolic pressure: 52 mmHg and trivial pericardial effusion. LHC and RHC (separate occasions) over the last 5-10 years which showed normal hemodynamics and normal coronaries. She had a RHC 2020 showing top-normal right sided filling pressures with mild pulmonary hypertension, top-normal PVR and normal pulmonary capillary wedge pressure. The cardiac index was normal, seen below.     On 2022, she was in [facility] ER for chest pain. EKG: NSR without ischemic changes. hsTrop: 12->16, d-dimer: 0.56, CXR showed cardiomegaly and diffuse interstitial prominence.     Dr. Nguyễn ordered echocardiogram, which was completed on 2022 and showed estimated RVSP = 50-55 mmHg, normal-sized LV with normal LV systolic function. Estimated EF 55- 60%. Wall motion grossly normal, mild concentric left  ventricular hypertrophy, grade I LV diastolic dysfunction, probably normal RV size and function.    At her initial visit on 10/11/2022, she reported that she felt very short of breath with minimal activity most of the time. She reports that this started about 1 year prior and had progressively gotten worse. She described PND and bendopnea. She reported that she experienced ankle swelling, worsened over the past year and usually worse towards the end of the night. She also reported some swelling in her abdomen. She reported daily palpations. I recommended a RHC which was done 11/28/2022 and showed: Normal right sided filling pressures. Mildly elevated left sided filling pressures. Precapillary pulmonary hypertension with mean PA 36 mmHg.    She was hospitalized on 3/19/2023 at [facility] for PE diagnosed on V/Q scan. She reports that she had presented with left arm pain and heaviness. She states that she was started on Apixaban. One week after her last office visit (4/6/2023), she presented to [facility] with chest pain, epistaxis and hemoptysis. Echocardiogram showed: left ventricular cavity size normal with mild left ventricular hypertrophy and preserved systolic function. Estimated EF 65%. Chest CTA showed no evidence of PE; Apixaban was discontinued.     She had a repeat echocardiogram (6/2023) which showed: Normal left ventricular cavity size and mild concentric left ventricular hypertrophy. Normal systolic function. EF: 60%. Normal right ventricular structure and function. Mild tricuspid valve regurgitation with estimated RVSP: 55 mmHg. Compared to previous study from 4/14/2023, estimated right ventricular systolic pressure were higher.    At her last office visit on 7/25/2023, she reported that she was not sure if she received/started the Macitentan. She stated at the time we initiated it, her Pulmonologist also started her on a new medication (Mycophenolate), and she had significant GI intolerance  with this. It was trialed at various doses but ultimately stopped. At that visit, I asked her to check if she had Macitentan and if she did to start it and monitor her SpO2.    She underwent V/Q scan in September 2023, which showed intermediate-to-high probability of pulmonary embolic disease, as a result she completed CTA Chest PE which showed: no evidence for filling defect or vessel cutoff to suggest pulmonary embolism. Enlargement of the pulmonary arteries compatible with pulmonary arterial hypertension was seen.    She reports she started Macitentan around August or September 2023. She stated that she had not been checking her SpO2. She reports she did not notice any difference since starting it. She reported she had been feeling more shortness of breath at times with ADLs.     She was last seen in the office in February 2024.    She presented to [facility] on 3/3/2024 with chest pressure, palpitations and nausea. She was noted to have hypoxia and tachypnea at presentation. She was found to have moderate-to-large pericardial effusion without tamponade. She was started on Colchicine 0.6 mg BID on 3/4/2024 and when she did not improve symptomatically, Prednisone 20 mg daily was added on 3/5/2024. On 3/7/2024, she developed multiple episodes of diarrhea and Colchicine dose was decreased to 0.3 mg. Coronary CTA, done as part of the ischemic workup, showed moderate 2 vessel CAD. She was recommended to begin Atorvastatin 20 mg daily and Clopidogrel 75 mg daily. She initially refused those two medications, but eventually agreed to take Atorvastatin.      She was discharged on Colchicine 0.3 mg for at least 3 months and Prednisone 20 mg for a total two week course until 3/19/2024, then decrease the dose to 10 mg daily for 2 weeks until 4/2/2024, then decrease dose to 5 mg daily for 2 weeks until 4/16/2024. She reports she tapered off Prednisone as instructed and has continued to take Colchicine as prescribed. She completed an  "echocardiogram (4/10/2024) which showed: Small-to-moderate sized, circumferential pericardial effusion. No echocardiographic evidence for cardiac tamponade. Compared to previous study from 3/4/2024, no significant change. Pericardial effusion size was similar, estimated right atrial pressure lower.      She presented to [facility] (4/16/2024) with increased pleuritic chest pain. On presentation to the ER, she was noted to be hypertensive and tachycardic. Labs showed mildly elevated troponin, elevated BNP and leukocytosis. CT angiography of the chest did not show evidence of PE. It was read as large pericardial effusion, evidence of emphysema, evidence of pulmonary hypertension, 16 mm left lobe lung mass.       She presented again to [facility] on 5/21/2024 at the request of her Rheumatologist. During her visit there, she was noted to be tachycardic and with some shortness of breath. Her chest pain was improved from her prior admission. On arrival, O2 saturations were normal. ECG showed: Sinus Tachycardia (HR: 125 bpm). BNP >300, hsTrop 32 ->24. POCUS in the emergency department showed evidence of pericardial effusion with no tamponade physiology. CTA Chest did not show evidence of pulmonary embolism. There were bilateral changes radiographically concerning for volume overload. She received Methylpredinsolone 125 mg in ED. Of note, she was on Prednisone 10 mg daily at home. She was given IV diuresis. She was discharged on Furosemide 20 mg PO PRN daily for fluid weight gain / swelling.       She reports that she feels out-of-breath, and that it can be \"anything that leads to it\". She states that it can even occur at rest without provocation. She reports significant back pain and that her persistent chest pain has been worse. She notes some swelling in her feet and stomach. She describes usual vertigo symptoms. She describes one fall that she had at home.     She states that she had PET scan for her lung nodule, which had concerning " "findings. She is planned for biopsy by Dr. Rodriguez as coordinated by Dr. Wasserman.     ---    Rheumatology: Dr. Trevizo  Pulmonology: Dr. Wasserman    Past Medical / Surgical History:  Pulmonary HTN, HTN, Raynaud's Disease, Cutaneous Systemic Scleroderma (dx 1998), ILD, PE (3/2023), Follicular Carcinoma of Thyroid, Tenosynovitis, de Quervain, h/o Hernia Repair, h/o Appendicitis, h/o Digit Amputation, H/o Total Thyroidectomy (Dr. Pacheco at [facility]; 4/2013)    Family & Social History: She is , she has two children. She has worked as an .     Tobacco: former 2005  EtOH: never   Illicits: never     Her brother has CAD with stent  1st cousin with SLE  Both parents had \"heart problems\"    Allergies:   Allergies   Allergen Reactions   • Aspirin Shortness of breath     Other reaction(s): Unknown  \"stop breathing\"     • Ibuprofen Shortness of breath     Stop breathing   • Iodine Shortness of breath     Other reaction(s): Unknown   • Iodine And Iodide Containing Products Shortness of breath     ? rash   • Penicillins Shortness of breath     Other reaction(s): Unknown   • Sulfa (Sulfonamide Antibiotics) Angioedema   • Clindamycin    • Hydrochlorothiazide      Other reaction(s): Abdominal Pain   Slow heart rate   • Oxycodone      Other reaction(s): Vomiting   • Sulfamethoxazole-Trimethoprim      Other reaction(s): Vomiting, Weakness    • Latex Rash       Medications:   Current Outpatient Medications   Medication Sig Dispense Refill   • acetaminophen (TYLENOL) 325 mg tablet Take 2 tablets (650 mg total) by mouth every 6 (six) hours as needed for pain.     • amLODIPine (NORVASC) 5 mg tablet Take 10 mg by mouth every morning.     • atorvastatin (LIPITOR) 40 mg tablet Take 1 tablet (40 mg total) by mouth daily. 30 tablet 2   • biotin 1 mg tablet Take 1,000 mcg by mouth daily.     • cholecalciferol, vitamin D3, 1,000 unit (25 mcg) tablet Take 1,000 Units by mouth daily.     • cyanocobalamin, vitamin B-12, 1,000 mcg capsule " Take 1,000 mcg by mouth daily.     • docusate sodium (COLACE) 100 mg capsule Take 100 mg by mouth daily as needed for constipation.     • levothyroxine (SYNTHROID) 100 mcg tablet Take 100 mcg by mouth daily.     • lidocaine (ASPERCREME) 4 % adhesive patch,medicated topical patch Apply 1 patch topically daily as needed (pain). Remove & discard patch within 12 hours or as directed by prescriber. 15 patch 0   • macitentan (OPSUMIT) 10 mg tablet tablet Take 1 tablet (10 mg total) by mouth daily. 30 tablet 11   • meclizine (ANTIVERT) 25 mg tablet Take 25 mg by mouth daily as needed.     • mupirocin (BACTROBAN) 2 % ointment Apply 1 application. topically daily. Behind ears     • pantoprazole (PROTONIX) 40 mg EC tablet Take 1 tablet (40 mg total) by mouth daily Indications: stress ulcer prevention. 30 tablet 1   • predniSONE (DELTASONE) 5 mg tablet Take 5 mg by mouth daily. Once a day     • sildenafiL, pulm.hypertension, (REVATIO) 20 mg tablet Take 1 tablet (20 mg total) by mouth 3 (three) times a day. 270 tablet 3   • sodium chloride (OCEAN) 0.65 % nasal spray Administer 2 sprays into each nostril 2 (two) times a day as needed for congestion. 15 mL 0   • tocilizumab (ACTEMRA) 200 mg/10 mL (20 mg/mL) solution Infuse 8 mg/kg into a venous catheter every 28 (twentyeight) days.       No current facility-administered medications for this visit.       Review of Systems:   All other systems reviewed and are negative except as per HPI.    Physical Exam:     Wt Readings from Last 10 Encounters:   06/20/24 51.7 kg (114 lb)   06/14/24 52.6 kg (116 lb)   05/23/24 52.4 kg (115 lb 9.6 oz)   04/17/24 53.5 kg (118 lb)   04/10/24 44.5 kg (98 lb)   03/16/24 44.6 kg (98 lb 5.2 oz)   02/01/24 47.6 kg (105 lb)   07/25/23 50.8 kg (112 lb)   07/25/23 50.8 kg (112 lb)   06/21/23 51.3 kg (113 lb)       BP Readings from Last 5 Encounters:   06/20/24 130/72   05/24/24 (!) 164/73   04/19/24 (!) 156/71   04/10/24 128/72   03/16/24 (!) 136/6        Vitals:    06/20/24 1111   BP: 130/72   Pulse: (!) 114   SpO2: 97%         Body mass index is 18.4 kg/m².  Body surface area is 1.55 meters squared.    Constitutional: NAD, AAOx3  HENT: Normocephalic, atraumatic head, sclerae anicteric, no cervical lymphadenopathy, trachea midline  Cardiovascular: RRR, no murmurs, rubs or gallops, JVP: 7-8 cm H2O   cm H2O, no carotid bruits.  Respiratory: CTA bilateral lung fields, no wheezes, rales or rhonchi  GI: soft, non-tender/non-distended  Musculoskeletal / Extremities: trace peripheral edema, +2 pulses bilateral radial, DP/PT arteries, skin tightening on face and fingertips  Skin: no rashes  Neuro / Psych: no focal deficits, CNII-XII grossly intact, appropriate, cooperative    Diagnostic Data:     Component      Latest Ref Rng 4/18/2024 5/24/2024   WBC      3.80 - 10.50 K/uL 8.40  16.22 (H)    RBC      3.93 - 5.22 M/uL 3.54 (L)  3.82 (L)    Hemoglobin      11.8 - 15.7 g/dL 9.8 (L)  10.3 (L)    Hematocrit      35.0 - 45.0 % 32.8 (L)  33.9 (L)    Platelets      150 - 369 K/uL 254  289       Component      Latest Ref Rn 5/24/2024   Magnesium      1.8 - 2.5 mg/dL 2.1      Component      Latest Ref Rng 4/13/2023 3/7/2024 5/22/2024   TSH      0.34 - 5.60 mIU/L 4.71  0.28 (L)  1.03      Component      Latest Ref Rng 5/24/2024   Sodium      136 - 145 mEQ/L 138    Potassium, Bld      3.5 - 5.1 mEQ/L 4.4    Chloride      98 - 107 mEQ/L 104    CO2      21 - 31 mEQ/L 24    BUN      7 - 25 mg/dL 40 (H)    Creatinine      0.6 - 1.2 mg/dL 1.1    Glucose      70 - 99 mg/dL 87    Calcium      8.6 - 10.3 mg/dL 9.5    eGFR      >=60.0 mL/min/1.73m*2 56.6 (L)    Anion Gap      3 - 15 mEQ/L 10      Component      Latest Ref Rng 4/13/2023   Hemoglobin A1C      <5.7 % 4.4        Component      Latest Ref Rng 4/14/2023   Triglycerides      30 - 149 mg/dL 44    Cholesterol      <=200 mg/dL 194    HDL      >=55 mg/dL 49 (L)    LDL Calculated      <=100 mg/dL 136 (H)    Non-HDL, Calculated       mg/dL 145    RISK      <=5.0  4.0       Component      Latest Ref Rng 4/13/2023   High Sens Troponin I      <15.0 pg/mL 23.0 (H)       Component      Latest Ref Rng 4/14/2023   Ferritin      11 - 250 ng/mL 75      Component      Latest Ref Rng 4/14/2023   Iron      35 - 150 ug/dL 29 (L)    TIBC      270 - 460 ug/dL 245 (L)    UIBC      180 - 360 ug/dL 216    Iron Saturation      15 - 45 % 12 (L)        Component      Latest Ref Rng 6/27/2022 4/13/2023   BNP      <=100 pg/mL 111 (H)  105 (H)                          ---    ECG (6/20/2024, personally reviewed): Sinus Tachycardia (HR: 114 bpm), ANA, old anterior infarct pattern, non-specific ST/T-wave abnormalities  ---    PET/CT Skull-to-Thigh (6/4/2024):   An approximately 1.6 cm pulmonary nodule at the medial left lung base is  significantly FDG avid at max SUV 8.5.  This is nonspecific and can be seen in  the setting of benign pathology however malignancy is a concern.     There is more mild to moderate nonspecific uptake localizing to lita-fissural  nodules particularly on the left.     There is  moderate nonspecific left hilar and mediastinal dawson uptake.     No additional suspicious FDG avid findings appreciated.     CTA Chest (5/21/2024):   IMPRESSION:  1.  No evidence of acute pulmonary embolism.  2.  Multiple new perifissural left lower lobe nodules, suspicious for a  neoplastic process. PET/CT and or tissue sampling is recommended.  3.  Chronic interstitial lung disease in an NSIP pattern, with overall slightly  progressed appearance since March 2023. Pulmonary consultation is recommended.  4.  Stable cardiomegaly and large pericardial effusion.  5.  Mediastinal and supraclavicular adenopathy, similar to prior study, also  indeterminate, which could be further evaluated at time of PET/CT.     CTA Chest (4/16/2024):   IMPRESSION:  1. No evidence of pulmonary embolism.  2. Stable cardiomegaly and large pericardial effusion. Cardiology  consultation  suggested.  3. Emphysema with bilateral lower lobe groundglass opacity and bronchiectasis in  an NSIP pattern, which can be seen with scleroderma. Pulmonary consultation  suggested.  4. Stable 16 mm left lower lobe masslike density which may represent  atelectasis, lung cancer or lymphoma. When clinically appropriate PET/CT  suggested.  5. Stable prominence of the main pulmonary artery which can be seen with  pulmonary hypertension     TTE (5/23/2024, personally reviewed):  Mild increased wall thickness with normal left ventricular cavity and preserved systolic function. EF 65%. No wall motion abnormalities.   Normal right ventricular size with preserved systolic function.   Tiny IVC with >50% respiratory variation consistent with low-normal right sided filling pressures.   Small pericardial effusion, predominantly adjacent to right atrium. No evidence of hemodynamic compromise with normal respiratory variation and no chamber collapse. Prominent epicardial fat.   Compared with previous study of 4/10/24, small pericardial effusion persists.      TTE (4/10/2024, personally reviewed):  1. Normal left ventricular cavity size with mild concentric left ventricular hypertrophy. Normal systolic function. EF: 60%. No regional wall motion abnormalities. Cannot determine diastolic function. Global longitudinal strain not reported due to technical limitations of study.   2. Aortic valve has three cusps. Trace aortic regurgitation. Aortic valve and root sclerosis.  3. Thickened mitral valve leaflets. Trace mitral valve regurgitation. Normal left atrial size.   4. Normal right ventricular structure and function. Trace tricuspid valve regurgitation with estimated RVSP: 51 mmHg (assuming right atrial pressure of 3 mmHg). Normal right atrial size. Pulmonic valve structurally normal. Trace pulmonic valve regurgitation. Pulsed wave Doppler of the RVOT systolic profile shows decreased acceleration times and geometry  consistent with mildly elevated PVR.  5. IVC size is normal with > 50% inspiratory collapse consistent with normal right atrial pressure. Small-to-moderate sized, circumferential pericardial effusion. No echocardiographic evidence for cardiac tamponade.   6. Compared to previous study from 3/4/2024, no significant change. Pericardial effusion size is similar, estimated right atrial pressure lower.           CTA Abd / Pelvis (3/13/2024, personally reviewed):   There is dilatation of the of proximal/mid small bowel with relatively abrupt  transition left hemiabdomen, likely obstruction. Questionable lesion at the  transition point. This could be better evaluated with CT or MRI enterography.     Aorta and major branches patent. Superior mesenteric artery patent without  significant stenosis.  Suspect moderate stenosis of the origins of the of renal arteries bilaterally.     Large pericardial effusion, no change.     Patchy opacities lower lungs, similar to prior.      Coronary CTA (3/11/2024):   1. Two-vessel coronary artery disease as above that is moderate in severity.  CT  FFR is normal..  2. There is mitral annular calcification and aortic sclerosis.  The right atrium  is enlarged.  There is left ventricular hypertrophy.  There is a moderate to  large circumferential pericardial effusion.  3. Normal left ventricular wall motion and systolic function.  4. Coronary calcium score 313, 96th percentile     TTE (3/4/2024, personally reviewed):   Normal-sized left ventricle. Mild left ventricular hypertrophy. Preserved systolic function. LVEF 60%. No regional wall motion abnormalities. Grade II diastolic dysfunction.  Normal global longitudinal strain (-20%).  The aortic valve is not well seen.  Cannot determine the number of cusps.  Sclerotic leaflets.  No aortic stenosis.  Trace aortic insufficiency.  Normal sized aortic root.  The visualized portion of the ascending aorta is normal in size.  The mitral valve leaflets  are thickened. Mild mitral annular calcification. Trace mitral regurgitation.   Mildly dilated left atrium.  Normal-sized right ventricle. Normal right ventricular systolic function.  Thickened tricuspid valve. There is mild tricuspid regurgitation with estimated RVSP of 46 mmHg.   Pulmonic valve is not well visualized. Trace pulmonic regurgitation.   Mildly dilated right atrium.  IVC is enlarged with <50% respiratory variation consistent with elevated right atrial filling pressure (at least 15 mmHg).   Moderate, circumferential, circumferential pericardial effusion.  The largest pocket is located inferolaterally.  Elevated right atrial pressure.  No other clear echocardiographic features of increased pericardial pressure.  Correlate clinically.   Compared to previous TTE from 6/21/2023, pericardial effusion now moderate in size.  Right atrial pressure now elevated.         CTA Chest (3/3/2024):   IMPRESSION:  1.  No evidence of acute pulmonary embolism to the level of the segmental  branches.  2.  Moderate to large pericardial effusion measuring higher than simple fluid  density.  3.  Lower lobe lung field predominant interstitial thickening with some relative  subpleural sparing, consistent with a chronic interstitial lung disease,  unchanged from the prior study.  4.  Continued bilateral axillary, mediastinal, and bilateral hilar  lymphadenopathy, not definitely changed from the prior study, possibly related  to the patient's sarcoid.  5.  Emphysema.      CTA Chest PE (10/4/2023):  IMPRESSION:  1. No evidence for filling defect or vessel cutoff to suggest pulmonary  embolism.  Enlargement of the pulmonary arteries compatible with pulmonary  arterial hypertension.  2. Changes throughout the lungs as described above which can be seen with  systemic sclerosis/scleroderma.  Underlying pneumonia the lung bases cannot be  entirely excluded in the proper clinical setting.  3.  Additional stable findings as described  above.        VQ (9/12/2023):  IMPRESSION:  Intermediate to high probability of pulmonary embolic disease.     6MWT (7/25/2023, on Sildenafil 20 mg TID):        TTE (6/21/2023, personally reviewed):  1. Normal left ventricular cavity size and mild concentric left ventricular hypertrophy. Normal systolic function. EF: 60%. No regional wall motion abnormalities. Grade I diastolic dysfunction.   2. Aortic valve cusps not well visualized. Trace aortic regurgitation. Aortic valve and root sclerosis.  3. Thickened mitral valve leaflets. Mild mitral annular calcification. Trace mitral regurgitation. Mildly dilated left atrial size.   4. Normal right ventricular structure and function. Mild tricuspid valve regurgitation with estimated RVSP: 55 mmHg (assuming right atrial pressure of 3 mmHg). Normal right atrial size. Pulmonic valve not well visualized. Trace pulmonic valve regurgitation. Pulsed wave Doppler of the RVOT systolic profile show decreased acceleration times ( msec) and late systolic notching consistent with elevated PVR.   5. IVC size is normal with > 50% inspiratory collapse consistent with normal right atrial pressure. Small pericardial effusion without echocardiographic evidence of tamponade.  6. Compared to previous study from 4/14/2023, estimated right ventricular systolic pressure is higher.        TTE (4/14/2023, personally reviewed):   The left ventricular cavity size is normal with mild left ventricular hypertrophy and preserved systolic function. Estimated EF 65%. No regional wall motion abnormalities. Grade I diastolic dysfunction.     The aortic valve is tricuspid. Aortic valve sclerosis. Trace aortic regurgitation.      The visualized portions of the aortic root and ascending aorta are normal in size.     The mitral valve is mildly thickened. Mild mitral annular calcification. Trace mitral regurgitation.       The left atrium is severely dilated.      The right ventricle is normal in size with  preserved systolic function     The pulmonic valve is not well seen.     The tricuspid valve is normal in appearance. mild tricuspid regurgitation with an estimated RVSP of 34 mmHg.       Right atrium is normal in size.     The IVC is small and collapses > 50% with inspiration consistent with normal right atrial pressures.     Small pericardial effusion, mostly posterior.       Compared to previous echocardiogram from 8/22/2022, there is no significant change        TTE (11/28/2022, personally reviewed):   Normal right- and mildly elevated left-sided filling pressures (RA: 3 mmHg, PCWP: 13 mmHg)  Elevated pulmonary artery pressures (60/22(35 mmHg)) in the setting of PCWP: 13 mmHg.   Normal cardiac output and index (CO: 5.1 L/min, CI: 3.2 L/min/m2)  PVR: 4.3 Wood units. SVR: 1840 dynes/sec/cm5      TTE (8/22/2022, personally reviewed):  Normal-sized LV. Normal LV systolic function. Estimated EF 55- 60%. Wall motion appears grossly normal. Mild concentric left ventricular hypertrophy. Grade I LV diastolic dysfunction.  Pulmonic valve not well visualized. Grossly normal pulmonic valve structure. Trace pulmonic valve regurgitation. No pulmonic valve stenosis.  Aortic valve not well visualized. Aortic valve not well seen but likely trileaflet. Focal aortic valve calcification present. Sclerotic aortic valve leaflets. Mild aortic valve regurgitation. No aortic valve stenosis.  RV not well visualized. Normal-sized RV. Normal RV systolic function.  Normal-sized RA.  There is a small loculated pericardial effusion anterior to the right atrium. No cardiac tamponade.  Sclerotic mitral valve. Mitral valve calcification of the anterior leaflet present.  Trace mitral valve regurgitation.  No significant mitral valve stenosis.  Normal-sized IVC. IVC demonstrates normal respiratory collapse.  Tricuspid valve structure is grossly normal. Mild to moderate tricuspid valve regurgitation.  Estimated RVSP = 50-55 mmHg.  Mildly dilated  LA.  No previous echocardiogram available for comparison.     TTE (4/21/2022, outside study):  This result has an attachment that is not available.   •  A complete transthoracic echocardiogram (including 2D, color flow   Doppler, spectral Doppler and M-mode imaging) was performed using the   standard protocol. The study quality was adequate.   •  The left ventricle is normal in size. There is moderately increased   wall thickness consistent with moderate concentric hypertrophy.   Endocardium is incompletely visualized, but no regional wall motion   abnormalities are noted in the visualized segments. Normal left   ventricular ejection fraction. The left ventricular ejection fraction by   visual estimate is 55% to 60%.   •  The right ventricle is normal in size. There is normal function of the   right ventricle.   •  The left atrium is normal in size. Left atrial volume is 58 mL.   •  The right atrial volume measures 52 mL. The right atrial volume index   measures 34 mL/m2.   •  There is trace mitral regurgitation. There is no mitral stenosis.   •  The aortic valve is trileaflet. There is mild aortic valve thickening.   There is no aortic stenosis. There is trace aortic regurgitation.   •  There is mild to moderate tricuspid regurgitation. Pulmonary artery   systolic pressure measures 52 mmHg. The pulmonary artery systolic pressure   is moderately elevated.   •  The aorta measures 3.2 cm at the sinus of Valsalva. The proximal   segment of the ascending aorta is mildly enlarged. The proximal segment of   the ascending aorta measures 3.4 cm. The proximal segment of the ascending   aorta measures 2.2 cm/m2, indexed for body surface area.   •  Trivial pericardial effusion present. There is no echocardiographic   evidence of cardiac tamponade.   •  The inferior vena cava is normal in size (diameter <21 mm) and   decreases >50% in size with inspiration, suggesting a normal right atrial   pressure of 3 mmHg (range 0-5 mm  Hg).   •  Compared to the prior study of 3/13/2020, there are no significant   changes.        PFT (3/23/2022):      PFT (7/14/2021):      CT Chest (5/2/2021, outside study):  CLINICAL INFORMATION: Systemic sclerosis. Interstitial lung disease. Groundglass nodule     PROCEDURE: Unenhanced CT of the chest was performed using thin section reconstructions. Images were obtained on inspiration and expiration.     COMPARISON: June and August 2020     FINDINGS:     LUNGS, PLEURA:     Groundglass opacities with reticulation with subpleural sparing in the lower lobes, without honeycombing or architectural distortion, unchanged.     Right upper lobe groundglass nodule, image 114 of series 3, 8 x 11 mm, previously 6 mm.     Expiratory images are of diagnostic quality and do not demonstrate evidence of clinically significant air trapping.     CARDIOVASCULAR, MEDIASTINUM, THYROID: Coronary calcifications. Trace pericardial effusion.     LYMPH NODES: Subcentimeter mediastinal lymph nodes, unchanged. Unchanged axillary lymph nodes.     SKELETON, CHEST WALL: No destructive bone lesion     UPPER ABDOMEN: Patulous esophagus. 3 mm right renal stone.       RHC (9/02/2020):  RA   8 mmHg   RV   40/5 mmHg   PA (mean)  44/16 (25) mmHg   PCWP   12 mmHg   CO   4.65 lpm (Thermodilution)   CI   2.65 lpm/m-squared   SVI    33 ml/beat/m-squared (lower limit normal 29)   PVR   2.8 mmHg/l/min (Wood units)   SVR   2064 dyne-sec-cm-5         PFT (3/18/2020):      TTE (3/13/2020, outside study):  · The study quality was good.   · The left ventricle is normal in size. Top normal left ventricular wall   thickness. There are no segmental wall motion abnormalities. The left   ventricular ejection fraction is 55-60% by visual estimate and 3D   echocardiography. Normal left ventricular ejection fraction.   · There is grade I (mild) LV diastolic dysfunction.   · The right ventricle is normal in size. There is normal function of the   right ventricle.   ·  The right atrium is mildly dilated.   · There is mild mitral valve anterior leaflet thickening. There is mild   mitral valve leaflet calcification. There is flattening of the mitral   leaflets without raphael prolapse. There is trace mitral regurgitation.   · The aortic valve is trileaflet. There is mild thickening of the aortic   valve. There is mild calcification of the aortic valve. There is no aortic   /stenosis. There is trace aortic regurgitation.   · There is mild tricuspid valve leaflet thickening. There is mild to   moderate tricuspid regurgitation. The pulmonary artery systolic pressure   is moderately elevated. Pulmonary artery systolic pressure measures 50   mmHg. There is mid-systolic notching of the RVOT VTI consistent with   elevated pulmonary vascular resistance.   · The inferior vena cava is normal in size (diameter <21 mm) and decreases   >50% in size with inspiration, suggesting a normal right atrial pressure   of 3 mmHg (range 0-5 mm Hg).   · Trivial loculated pericardial effusion present adjacent to the right   ventricle and adjacent to the right atrium.          Assessment / Plan:     1. Pulmonary Hypertension (WHO Group I, NYHA/WHO FC III): Occurring in the background of Systemic Scleroderma with ILD. August 2022 TTE showed normal RV size and function, but progressive exertional decline, worsened DLCO and resting tachycardia suggest there may be progression of her pulmonary vascular disease and limitation of cardiac output. This was proven with 11/2022 RHC showing mean PA 35 mmHg (with PCWP 13 mmHg) and PVR 4.3 Wood units. Recent TTEs have shown appropriate RV:PA coupling with normal RV size and systolic function.     Intravascularly, she is euvolemic on exam today. She will continue Sildenafil 20 mg TID, and Macitentan 10 mg daily for combination therapy.     She was unable to complete 6-minute walk test today due to instability.     2. HTN: Controlled. She is continued on Amlodipine.     3.  "Systemic Scleroderma / Raynaud's Disease: Per Rheumatology (Dr. Trevizo).      4. ILD: Per Pulmonology and Rheumatology (Dr. Trevizo). She has not tolerated trials of Mycophenolate.     5. PE: She has had elevated probability on V/Q scans, but CTA Chest has consistently not shown evidence for acute or chronic thromboembolism. Apixaban has been discontinued.     6. CAD: Two-vessel non-obstructive disease on March 2024 CCTA. She is continued on Atorvastatin 40 mg daily.     7. Pre-procedural Cardiovascular Evaluation and Risk Stratification: She is proposed for biopsy of FDG-avid LLL lung mass with Dr. Rodriguez. There is recent CCTA showing that she does not have obstructive CAD. Her symptoms are at baseline, and she is compensated on exam. She can proceed without further cardiovascular testing at elevated, but acceptable risk. She should continue uninterrupted Sildenafil and Macitentan lita-procedurally. Would plan to administer mid-day Sildenafil dose when she is due for it.     Thank you for allowing me to participate in the care of your patient. I would like to see her in the office in 3 months.     Please do not hesitate to contact me with any questions or issues.     Sincerely,     Timothy Arvizu, DO  Cardiology / Heart Failure / Pulmonary Hypertension    [email or web address]    [phone]    [facility]  [facility]  [address]   [place] [id number]      Counseling and coordination of care consisted of more than 50% of this complex medical evaluation; numerous diagnostic and management options were considered and testing results were reviewed and discussed with the patient as best as able, during this encounter; the patient has multiple medical problems and is on numerous medications; the patient has a high risk of complications due to their cardiac disease, and all these resulted in the assigned \"complex medical decision-making\" characterization and coding of this patient encounter.  I " spent  41 minutes on this date of service performing the following activities: obtaining history, performing examination, entering orders, documenting, preparing for visit, obtaining / reviewing records, providing counseling and education and independently reviewing study/studies.

## 2024-06-20 NOTE — LETTER
"July 15, 2024     Dr. Yancy Barba    Patient: Arielle Felix  YOB: 1960  Date of Visit: 2024      Dear Dr. Barba:    Thank you for referring Arielle Felix to me for evaluation. Below are my notes for this consultation.    If you have questions, please do not hesitate to call me. I look forward to following your patient along with you.         Sincerely,        Timothy Arvizu DO        CC: No Urmila Murphy PA C  2024 12:46 PM  Addendum  ADDENDUM: Pre- procedural Cardiovascular Assessment and Stratification Risk  Attention: Dr. Yancy Barba    Ms. Felix is proposed for ION Navigational Bronchoscopy with EBUS on 2024 by Dr. Barba. She denies anginal symptoms. There is no evidence of heart failure decompensation or unstable arrhythmia.     There is a recent CCTA showing that she does not have obstructive CAD. Her symptoms are at baseline, and she is compensated on exam. She can proceed without further cardiovascular testing at elevated, but acceptable risk. She should continue uninterrupted Sildenafil and Macitentan lita-procedurally. Would plan to administer mid-day Sildenafil dose when she is due for it.     If vasopressors are needed, recommend \"RV-friendly\" vasopressor strategy to preferentially raise SVR more than PVR (e.g., Vasopressin > Norepinephrine = Epinephrine > Dopamine >>> Phenylephrine, Ephedrine).  Judicious use of IV fluids, if needed.    Would avoid negative inotropic agents such as Propofol, if this can be accomplished.   Avoid bradycardia, especially lita-intubation, if this is needed         Sincerely,   RAS Marcos          ----------------------------------------------------------------------------------------------------------------        Timothy Arvizu DO  2024  1:59 PM  Signed  I had the pleasure of seeing Arielle Hammondstower (: 1960) in the [facility] Heart Failure and Pulmonary " Hypertension clinic on 6/20/2024. She presents today with her care giver, Sara.     Ms. Felix is a 63 y.o. female with a past medical history of Pulmonary HTN, HTN, Raynaud's Disease, Cutaneous Systemic Scleroderma (dx 1998), ILD, PE (3/2023). She is a patient of Dr. Nguyễn. She was previously followed by Dr. Jos Valdez at \[facility]". Echocardiogram from 4/21/2022 showed LVEF: 55-60%, mild aortic valve thickening, pulmonary artery systolic pressure: 52 mmHg and trivial pericardial effusion. LHC and RHC (separate occasions) over the last 5-10 years which showed normal hemodynamics and normal coronaries. She had a RHC 9/02/2020 showing top-normal right sided filling pressures with mild pulmonary hypertension, top-normal PVR and normal pulmonary capillary wedge pressure. The cardiac index was normal, seen below.     [place], [place] [facility] [place]. EKG: NSR without ischemic changes. hsTrop: 12->16, d-dimer: 0.56, CXR showed cardiomegaly and diffuse interstitial prominence.     Dr. Nguyễn ordered echocardiogram, which was completed on 8/22/2022 and showed estimated RVSP = 50-55 mmHg, normal-sized LV with normal LV systolic function. Estimated EF 55- 60%. Wall motion grossly normal, mild concentric left ventricular hypertrophy, grade I LV diastolic dysfunction, probably normal RV size and function.    At her initial visit on 10/11/2022, she reported that she felt very short of breath with minimal activity most of the time. She reports that this started about 1 year prior and had progressively gotten worse. She described PND and bendopnea. She reported that she experienced ankle swelling, worsened over the past year and usually worse towards the end of the night. She also reported some swelling in her abdomen. She reported daily palpations. I recommended a RHC which was done 11/28/2022 and showed: Normal right sided filling pressures. Mildly elevated left sided filling pressures. Precapillary  pulmonary hypertension with mean PA 36 mmHg.    She was hospitalized on 3/19/2023 at [facility] for PE diagnosed on V/Q scan. She reports that she had presented with left arm pain and heaviness. She states that she was started on Apixaban. One week after her last office visit (4/6/2023), she presented to [facility] with chest pain, epistaxis and hemoptysis. Echocardiogram showed: left ventricular cavity size normal with mild left ventricular hypertrophy and preserved systolic function. Estimated EF 65%. Chest CTA showed no evidence of PE; Apixaban was discontinued.     She had a repeat echocardiogram (6/2023) which showed: Normal left ventricular cavity size and mild concentric left ventricular hypertrophy. Normal systolic function. EF: 60%. Normal right ventricular structure and function. Mild tricuspid valve regurgitation with estimated RVSP: 55 mmHg. Compared to previous study from 4/14/2023, estimated right ventricular systolic pressure were higher.    At her last office visit on 7/25/2023, she reported that she was not sure if she received/started the Macitentan. She stated at the time we initiated it, her Pulmonologist also started her on a new medication (Mycophenolate), and she had significant GI intolerance with this. It was trialed at various doses but ultimately stopped. At that visit, I asked her to check if she had Macitentan and if she did to start it and monitor her SpO2.    She underwent V/Q scan in September 2023, which showed intermediate-to-high probability of pulmonary embolic disease, as a result she completed CTA Chest PE which showed: no evidence for filling defect or vessel cutoff to suggest pulmonary embolism. Enlargement of the pulmonary arteries compatible with pulmonary arterial hypertension was seen.    She reports she started Macitentan around August or September 2023. She stated that she had not been checking her SpO2. She reports she did not notice any difference since  starting it. She reported she had been feeling more shortness of breath at times with ADLs.     She was last seen in the office in February 2024.    [place] [facility] [place] with chest pressure, palpitations and nausea. She was noted to have hypoxia and tachypnea at presentation. She was found to have moderate-to-large pericardial effusion without tamponade. She was started on Colchicine 0.6 mg BID on 3/4/2024 and when she did not improve symptomatically, Prednisone 20 mg daily was added on 3/5/2024. On 3/7/2024, she developed multiple episodes of diarrhea and Colchicine dose was decreased to 0.3 mg. Coronary CTA, done as part of the ischemic workup, showed moderate 2 vessel CAD. She was recommended to begin Atorvastatin 20 mg daily and Clopidogrel 75 mg daily. She initially refused those two medications, but eventually agreed to take Atorvastatin.      She was discharged on Colchicine 0.3 mg for at least 3 months and Prednisone 20 mg for a total two week course until 3/19/2024, then decrease the dose to 10 mg daily for 2 weeks until 4/2/2024, then decrease dose to 5 mg daily for 2 weeks until 4/16/2024. She reports she tapered off Prednisone as instructed and has continued to take Colchicine as prescribed. She completed an echocardiogram (4/10/2024) which showed: Small-to-moderate sized, circumferential pericardial effusion. No echocardiographic evidence for cardiac tamponade. Compared to previous study from 3/4/2024, no significant change. Pericardial effusion size was similar, estimated right atrial pressure lower.      [place] [facility] [place] with increased pleuritic chest pain. On presentation to the ER, she was noted to be hypertensive and tachycardic. Labs showed mildly elevated troponin, elevated BNP and leukocytosis. CT angiography of the chest did not show evidence of PE. It was read as large pericardial effusion, evidence of emphysema, evidence of pulmonary hypertension, 16 mm left lobe  "lung mass.       [place] [facility] [place] at the request of her Rheumatologist. During her visit there, she was noted to be tachycardic and with some shortness of breath. Her chest pain was improved from her prior admission. On arrival, O2 saturations were normal. ECG showed: Sinus Tachycardia (HR: 125 bpm). BNP >300, hsTrop 32 ->24. POCUS in the emergency department showed evidence of pericardial effusion with no tamponade physiology. CTA Chest did not show evidence of pulmonary embolism. There were bilateral changes radiographically concerning for volume overload. She received Methylpredinsolone 125 mg in ED. Of note, she was on Prednisone 10 mg daily at home. She was given IV diuresis. She was discharged on Furosemide 20 mg PO PRN daily for fluid weight gain / swelling.       She reports that she feels out-of-breath, and that it can be \"anything that leads to it\". She states that it can even occur at rest without provocation. She reports significant back pain and that her persistent chest pain has been worse. She notes some swelling in her feet and stomach. She describes usual vertigo symptoms. She describes one fall that she had at home.     She states that she had PET scan for her lung nodule, which had concerning findings. She is planned for biopsy by Dr. Rodriguez as coordinated by Dr. Wasserman.     ---    Rheumatology: Dr. Trevizo  Pulmonology: Dr. Wasserman    Past Medical / Surgical History:  Pulmonary HTN, HTN, Raynaud's Disease, Cutaneous Systemic Scleroderma (dx 1998), ILD, PE (3/2023), Follicular Carcinoma of Thyroid, Tenosynovitis, de Quervain, h/o Hernia Repair, h/o Appendicitis, h/o Digit Amputation, H/o Total Thyroidectomy (Dr. Pacheco at [facility]; 4/2013)    Family & Social History: She is , she has two children. She has worked as an .     Tobacco: former 2005  EtOH: never   Illicits: never     Her brother has CAD with stent  1st cousin with SLE  Both parents had \"heart " "problems\"    Allergies:   Allergies   Allergen Reactions   • Aspirin Shortness of breath     Other reaction(s): Unknown  \"stop breathing\"     • Ibuprofen Shortness of breath     Stop breathing   • Iodine Shortness of breath     Other reaction(s): Unknown   • Iodine And Iodide Containing Products Shortness of breath     ? rash   • Penicillins Shortness of breath     Other reaction(s): Unknown   • Sulfa (Sulfonamide Antibiotics) Angioedema   • Clindamycin    • Hydrochlorothiazide      Other reaction(s): Abdominal Pain   Slow heart rate   • Oxycodone      Other reaction(s): Vomiting   • Sulfamethoxazole-Trimethoprim      Other reaction(s): Vomiting, Weakness    • Latex Rash       Medications:   Current Outpatient Medications   Medication Sig Dispense Refill   • acetaminophen (TYLENOL) 325 mg tablet Take 2 tablets (650 mg total) by mouth every 6 (six) hours as needed for pain.     • amLODIPine (NORVASC) 5 mg tablet Take 10 mg by mouth every morning.     • atorvastatin (LIPITOR) 40 mg tablet Take 1 tablet (40 mg total) by mouth daily. 30 tablet 2   • biotin 1 mg tablet Take 1,000 mcg by mouth daily.     • cholecalciferol, vitamin D3, 1,000 unit (25 mcg) tablet Take 1,000 Units by mouth daily.     • cyanocobalamin, vitamin B-12, 1,000 mcg capsule Take 1,000 mcg by mouth daily.     • docusate sodium (COLACE) 100 mg capsule Take 100 mg by mouth daily as needed for constipation.     • levothyroxine (SYNTHROID) 100 mcg tablet Take 100 mcg by mouth daily.     • lidocaine (ASPERCREME) 4 % adhesive patch,medicated topical patch Apply 1 patch topically daily as needed (pain). Remove & discard patch within 12 hours or as directed by prescriber. 15 patch 0   • macitentan (OPSUMIT) 10 mg tablet tablet Take 1 tablet (10 mg total) by mouth daily. 30 tablet 11   • meclizine (ANTIVERT) 25 mg tablet Take 25 mg by mouth daily as needed.     • mupirocin (BACTROBAN) 2 % ointment Apply 1 application. topically daily. Behind ears     • " pantoprazole (PROTONIX) 40 mg EC tablet Take 1 tablet (40 mg total) by mouth daily Indications: stress ulcer prevention. 30 tablet 1   • predniSONE (DELTASONE) 5 mg tablet Take 5 mg by mouth daily. Once a day     • sildenafiL, pulm.hypertension, (REVATIO) 20 mg tablet Take 1 tablet (20 mg total) by mouth 3 (three) times a day. 270 tablet 3   • sodium chloride (OCEAN) 0.65 % nasal spray Administer 2 sprays into each nostril 2 (two) times a day as needed for congestion. 15 mL 0   • tocilizumab (ACTEMRA) 200 mg/10 mL (20 mg/mL) solution Infuse 8 mg/kg into a venous catheter every 28 (twentyeight) days.       No current facility-administered medications for this visit.       Review of Systems:   All other systems reviewed and are negative except as per HPI.    Physical Exam:     Wt Readings from Last 10 Encounters:   06/20/24 51.7 kg (114 lb)   06/14/24 52.6 kg (116 lb)   05/23/24 52.4 kg (115 lb 9.6 oz)   04/17/24 53.5 kg (118 lb)   04/10/24 44.5 kg (98 lb)   03/16/24 44.6 kg (98 lb 5.2 oz)   02/01/24 47.6 kg (105 lb)   07/25/23 50.8 kg (112 lb)   07/25/23 50.8 kg (112 lb)   06/21/23 51.3 kg (113 lb)       BP Readings from Last 5 Encounters:   06/20/24 130/72   05/24/24 (!) 164/73   04/19/24 (!) 156/71   04/10/24 128/72   03/16/24 (!) 136/6       Vitals:    06/20/24 1111   BP: 130/72   Pulse: (!) 114   SpO2: 97%         Body mass index is 18.4 kg/m².  Body surface area is 1.55 meters squared.    Constitutional: NAD, AAOx3  HENT: Normocephalic, atraumatic head, sclerae anicteric, no cervical lymphadenopathy, trachea midline  Cardiovascular: RRR, no murmurs, rubs or gallops, JVP: 7-8 cm H2O   cm H2O, no carotid bruits.  Respiratory: CTA bilateral lung fields, no wheezes, rales or rhonchi  GI: soft, non-tender/non-distended  Musculoskeletal / Extremities: trace peripheral edema, +2 pulses bilateral radial, DP/PT arteries, skin tightening on face and fingertips  Skin: no rashes  Neuro / Psych: no focal deficits, CNII-XII  grossly intact, appropriate, cooperative    Diagnostic Data:     Component      Latest Ref [facility] 4/18/2024 5/24/2024   WBC      3.80 - 10.50 K/uL 8.40  16.22 (H)    RBC      3.93 - 5.22 M/uL 3.54 (L)  3.82 (L)    Hemoglobin      11.8 - 15.7 g/dL 9.8 (L)  10.3 (L)    Hematocrit      35.0 - 45.0 % 32.8 (L)  33.9 (L)    Platelets      150 - 369 K/uL 254  289       Component      Latest Ref [facility] 5/24/2024   Magnesium      1.8 - 2.5 mg/dL 2.1      Component      Latest Ref [facility] 4/13/2023 3/7/2024 5/22/2024   TSH      0.34 - 5.60 mIU/L 4.71  0.28 (L)  1.03      Component      Latest Ref [facility] 5/24/2024   Sodium      136 - 145 mEQ/L 138    Potassium, Bld      3.5 - 5.1 mEQ/L 4.4    Chloride      98 - 107 mEQ/L 104    CO2      21 - 31 mEQ/L 24    BUN      7 - 25 mg/dL 40 (H)    Creatinine      0.6 - 1.2 mg/dL 1.1    Glucose      70 - 99 mg/dL 87    Calcium      8.6 - 10.3 mg/dL 9.5    eGFR      >=60.0 mL/min/1.73m*2 56.6 (L)    Anion Gap      3 - 15 mEQ/L 10      Component      Latest Ref [facility] 4/13/2023   Hemoglobin A1C      <5.7 % 4.4        Component      Latest [facility] 4/14/2023   Triglycerides      30 - 149 mg/dL 44    Cholesterol      <=200 mg/dL 194    HDL      >=55 mg/dL 49 (L)    LDL Calculated      <=100 mg/dL 136 (H)    Non-HDL, Calculated      mg/dL 145    RISK      <=5.0  4.0       Component      Latest Ref [facility] 4/13/2023   High Sens Troponin I      <15.0 pg/mL 23.0 (H)       Component      Latest Ref [facility] 4/14/2023   Ferritin      11 - 250 ng/mL 75      Component      Latest Ref [facility] 4/14/2023   Iron      35 - 150 ug/dL 29 (L)    TIBC      270 - 460 ug/dL 245 (L)    UIBC      180 - 360 ug/dL 216    Iron Saturation      15 - 45 % 12 (L)        Component      Latest Ref [facility] 6/27/2022 4/13/2023   BNP      <=100 pg/mL 111 (H)  105 (H)                          ---    ECG (6/20/2024, personally reviewed): Sinus Tachycardia (HR: 114 bpm), ANA, old anterior infarct pattern, non-specific ST/T-wave abnormalities  ---    PET/CT  Skull-to-Thigh (6/4/2024):   An approximately 1.6 cm pulmonary nodule at the medial left lung base is  significantly FDG avid at max SUV 8.5.  This is nonspecific and can be seen in  the setting of benign pathology however malignancy is a concern.     There is more mild to moderate nonspecific uptake localizing to lita-fissural  nodules particularly on the left.     There is  moderate nonspecific left hilar and mediastinal dawson uptake.     No additional suspicious FDG avid findings appreciated.     CTA Chest (5/21/2024):   IMPRESSION:  1.  No evidence of acute pulmonary embolism.  2.  Multiple new perifissural left lower lobe nodules, suspicious for a  neoplastic process. PET/CT and or tissue sampling is recommended.  3.  Chronic interstitial lung disease in an NSIP pattern, with overall slightly  progressed appearance since March 2023. Pulmonary consultation is recommended.  4.  Stable cardiomegaly and large pericardial effusion.  5.  Mediastinal and supraclavicular adenopathy, similar to prior study, also  indeterminate, which could be further evaluated at time of PET/CT.     CTA Chest (4/16/2024):   IMPRESSION:  1. No evidence of pulmonary embolism.  2. Stable cardiomegaly and large pericardial effusion. Cardiology consultation  suggested.  3. Emphysema with bilateral lower lobe groundglass opacity and bronchiectasis in  an NSIP pattern, which can be seen with scleroderma. Pulmonary consultation  suggested.  4. Stable 16 mm left lower lobe masslike density which may represent  atelectasis, lung cancer or lymphoma. When clinically appropriate PET/CT  suggested.  5. Stable prominence of the main pulmonary artery which can be seen with  pulmonary hypertension     TTE (5/23/2024, personally reviewed):  Mild increased wall thickness with normal left ventricular cavity and preserved systolic function. EF 65%. No wall motion abnormalities.   Normal right ventricular size with preserved systolic function.   Tiny IVC  with >50% respiratory variation consistent with low-normal right sided filling pressures.   Small pericardial effusion, predominantly adjacent to right atrium. No evidence of hemodynamic compromise with normal respiratory variation and no chamber collapse. Prominent epicardial fat.   Compared with previous study of 4/10/24, small pericardial effusion persists.      TTE (4/10/2024, personally reviewed):  1. Normal left ventricular cavity size with mild concentric left ventricular hypertrophy. Normal systolic function. EF: 60%. No regional wall motion abnormalities. Cannot determine diastolic function. Global longitudinal strain not reported due to technical limitations of study.   2. Aortic valve has three cusps. Trace aortic regurgitation. Aortic valve and root sclerosis.  3. Thickened mitral valve leaflets. Trace mitral valve regurgitation. Normal left atrial size.   4. Normal right ventricular structure and function. Trace tricuspid valve regurgitation with estimated RVSP: 51 mmHg (assuming right atrial pressure of 3 mmHg). Normal right atrial size. Pulmonic valve structurally normal. Trace pulmonic valve regurgitation. Pulsed wave Doppler of the RVOT systolic profile shows decreased acceleration times and geometry consistent with mildly elevated PVR.  5. IVC size is normal with > 50% inspiratory collapse consistent with normal right atrial pressure. Small-to-moderate sized, circumferential pericardial effusion. No echocardiographic evidence for cardiac tamponade.   6. Compared to previous study from 3/4/2024, no significant change. Pericardial effusion size is similar, estimated right atrial pressure lower.           CTA Abd / Pelvis (3/13/2024, personally reviewed):   There is dilatation of the of proximal/mid small bowel with relatively abrupt  transition left hemiabdomen, likely obstruction. Questionable lesion at the  transition point. This could be better evaluated with CT or MRI enterography.     Aorta and  major branches patent. Superior mesenteric artery patent without  significant stenosis.  Suspect moderate stenosis of the origins of the of renal arteries bilaterally.     Large pericardial effusion, no change.     Patchy opacities lower lungs, similar to prior.      Coronary CTA (3/11/2024):   1. Two-vessel coronary artery disease as above that is moderate in severity.  CT  FFR is normal..  2. There is mitral annular calcification and aortic sclerosis.  The right atrium  is enlarged.  There is left ventricular hypertrophy.  There is a moderate to  large circumferential pericardial effusion.  3. Normal left ventricular wall motion and systolic function.  4. Coronary calcium score 313, 96th percentile     TTE (3/4/2024, personally reviewed):   Normal-sized left ventricle. Mild left ventricular hypertrophy. Preserved systolic function. LVEF 60%. No regional wall motion abnormalities. Grade II diastolic dysfunction.  Normal global longitudinal strain (-20%).  The aortic valve is not well seen.  Cannot determine the number of cusps.  Sclerotic leaflets.  No aortic stenosis.  Trace aortic insufficiency.  Normal sized aortic root.  The visualized portion of the ascending aorta is normal in size.  The mitral valve leaflets are thickened. Mild mitral annular calcification. Trace mitral regurgitation.   Mildly dilated left atrium.  Normal-sized right ventricle. Normal right ventricular systolic function.  Thickened tricuspid valve. There is mild tricuspid regurgitation with estimated RVSP of 46 mmHg.   Pulmonic valve is not well visualized. Trace pulmonic regurgitation.   Mildly dilated right atrium.  IVC is enlarged with <50% respiratory variation consistent with elevated right atrial filling pressure (at least 15 mmHg).   Moderate, circumferential, circumferential pericardial effusion.  The largest pocket is located inferolaterally.  Elevated right atrial pressure.  No other clear echocardiographic features of increased  pericardial pressure.  Correlate clinically.   Compared to previous TTE from 6/21/2023, pericardial effusion now moderate in size.  Right atrial pressure now elevated.         CTA Chest (3/3/2024):   IMPRESSION:  1.  No evidence of acute pulmonary embolism to the level of the segmental  branches.  2.  Moderate to large pericardial effusion measuring higher than simple fluid  density.  3.  Lower lobe lung field predominant interstitial thickening with some relative  subpleural sparing, consistent with a chronic interstitial lung disease,  unchanged from the prior study.  4.  Continued bilateral axillary, mediastinal, and bilateral hilar  lymphadenopathy, not definitely changed from the prior study, possibly related  to the patient's sarcoid.  5.  Emphysema.      CTA Chest PE (10/4/2023):  IMPRESSION:  1. No evidence for filling defect or vessel cutoff to suggest pulmonary  embolism.  Enlargement of the pulmonary arteries compatible with pulmonary  arterial hypertension.  2. Changes throughout the lungs as described above which can be seen with  systemic sclerosis/scleroderma.  Underlying pneumonia the lung bases cannot be  entirely excluded in the proper clinical setting.  3.  Additional stable findings as described above.        VQ (9/12/2023):  IMPRESSION:  Intermediate to high probability of pulmonary embolic disease.     6MWT (7/25/2023, on Sildenafil 20 mg TID):        TTE (6/21/2023, personally reviewed):  1. Normal left ventricular cavity size and mild concentric left ventricular hypertrophy. Normal systolic function. EF: 60%. No regional wall motion abnormalities. Grade I diastolic dysfunction.   2. Aortic valve cusps not well visualized. Trace aortic regurgitation. Aortic valve and root sclerosis.  3. Thickened mitral valve leaflets. Mild mitral annular calcification. Trace mitral regurgitation. Mildly dilated left atrial size.   4. Normal right ventricular structure and function. Mild tricuspid valve  regurgitation with estimated RVSP: 55 mmHg (assuming right atrial pressure of 3 mmHg). Normal right atrial size. Pulmonic valve not well visualized. Trace pulmonic valve regurgitation. Pulsed wave Doppler of the RVOT systolic profile show decreased acceleration times ( msec) and late systolic notching consistent with elevated PVR.   5. IVC size is normal with > 50% inspiratory collapse consistent with normal right atrial pressure. Small pericardial effusion without echocardiographic evidence of tamponade.  6. Compared to previous study from 4/14/2023, estimated right ventricular systolic pressure is higher.        TTE (4/14/2023, personally reviewed):   The left ventricular cavity size is normal with mild left ventricular hypertrophy and preserved systolic function. Estimated EF 65%. No regional wall motion abnormalities. Grade I diastolic dysfunction.     The aortic valve is tricuspid. Aortic valve sclerosis. Trace aortic regurgitation.      The visualized portions of the aortic root and ascending aorta are normal in size.     The mitral valve is mildly thickened. Mild mitral annular calcification. Trace mitral regurgitation.       The left atrium is severely dilated.      The right ventricle is normal in size with preserved systolic function     The pulmonic valve is not well seen.     The tricuspid valve is normal in appearance. mild tricuspid regurgitation with an estimated RVSP of 34 mmHg.       Right atrium is normal in size.     The IVC is small and collapses > 50% with inspiration consistent with normal right atrial pressures.     Small pericardial effusion, mostly posterior.       Compared to previous echocardiogram from 8/22/2022, there is no significant change        TTE (11/28/2022, personally reviewed):   Normal right- and mildly elevated left-sided filling pressures (RA: 3 mmHg, PCWP: 13 mmHg)  Elevated pulmonary artery pressures (60/22(35 mmHg)) in the setting of PCWP: 13 mmHg.   Normal cardiac  output and index (CO: 5.1 L/min, CI: 3.2 L/min/m2)  PVR: 4.3 Wood units. SVR: 1840 dynes/sec/cm5      TTE (8/22/2022, personally reviewed):  Normal-sized LV. Normal LV systolic function. Estimated EF 55- 60%. Wall motion appears grossly normal. Mild concentric left ventricular hypertrophy. Grade I LV diastolic dysfunction.  Pulmonic valve not well visualized. Grossly normal pulmonic valve structure. Trace pulmonic valve regurgitation. No pulmonic valve stenosis.  Aortic valve not well visualized. Aortic valve not well seen but likely trileaflet. Focal aortic valve calcification present. Sclerotic aortic valve leaflets. Mild aortic valve regurgitation. No aortic valve stenosis.  RV not well visualized. Normal-sized RV. Normal RV systolic function.  Normal-sized RA.  There is a small loculated pericardial effusion anterior to the right atrium. No cardiac tamponade.  Sclerotic mitral valve. Mitral valve calcification of the anterior leaflet present.  Trace mitral valve regurgitation.  No significant mitral valve stenosis.  Normal-sized IVC. IVC demonstrates normal respiratory collapse.  Tricuspid valve structure is grossly normal. Mild to moderate tricuspid valve regurgitation.  Estimated RVSP = 50-55 mmHg.  Mildly dilated LA.  No previous echocardiogram available for comparison.     TTE (4/21/2022, outside study):  This result has an attachment that is not available.   •  A complete transthoracic echocardiogram (including 2D, color flow   Doppler, spectral Doppler and M-mode imaging) was performed using the   standard protocol. The study quality was adequate.   •  The left ventricle is normal in size. There is moderately increased   wall thickness consistent with moderate concentric hypertrophy.   Endocardium is incompletely visualized, but no regional wall motion   abnormalities are noted in the visualized segments. Normal left   ventricular ejection fraction. The left ventricular ejection fraction by   visual  estimate is 55% to 60%.   •  The right ventricle is normal in size. There is normal function of the   right ventricle.   •  The left atrium is normal in size. Left atrial volume is 58 mL.   •  The right atrial volume measures 52 mL. The right atrial volume index   measures 34 mL/m2.   •  There is trace mitral regurgitation. There is no mitral stenosis.   •  The aortic valve is trileaflet. There is mild aortic valve thickening.   There is no aortic stenosis. There is trace aortic regurgitation.   •  There is mild to moderate tricuspid regurgitation. Pulmonary artery   systolic pressure measures 52 mmHg. The pulmonary artery systolic pressure   is moderately elevated.   •  The aorta measures 3.2 cm at the sinus of Valsalva. The proximal   segment of the ascending aorta is mildly enlarged. The proximal segment of   the ascending aorta measures 3.4 cm. The proximal segment of the ascending   aorta measures 2.2 cm/m2, indexed for body surface area.   •  Trivial pericardial effusion present. There is no echocardiographic   evidence of cardiac tamponade.   •  The inferior vena cava is normal in size (diameter <21 mm) and   decreases >50% in size with inspiration, suggesting a normal right atrial   pressure of 3 mmHg (range 0-5 mm Hg).   •  Compared to the prior study of 3/13/2020, there are no significant   changes.        PFT (3/23/2022):      PFT (7/14/2021):      CT Chest (5/2/2021, outside study):  CLINICAL INFORMATION: Systemic sclerosis. Interstitial lung disease. Groundglass nodule     PROCEDURE: Unenhanced CT of the chest was performed using thin section reconstructions. Images were obtained on inspiration and expiration.     COMPARISON: June and August 2020     FINDINGS:     LUNGS, PLEURA:     Groundglass opacities with reticulation with subpleural sparing in the lower lobes, without honeycombing or architectural distortion, unchanged.     Right upper lobe groundglass nodule, image 114 of series 3, 8 x 11 mm,  previously 6 mm.     Expiratory images are of diagnostic quality and do not demonstrate evidence of clinically significant air trapping.     CARDIOVASCULAR, MEDIASTINUM, THYROID: Coronary calcifications. Trace pericardial effusion.     LYMPH NODES: Subcentimeter mediastinal lymph nodes, unchanged. Unchanged axillary lymph nodes.     SKELETON, CHEST WALL: No destructive bone lesion     UPPER ABDOMEN: Patulous esophagus. 3 mm right renal stone.       RHC (9/02/2020):  RA   8 mmHg   RV   40/5 mmHg   PA (mean)  44/16 (25) mmHg   PCWP   12 mmHg   CO   4.65 lpm (Thermodilution)   CI   2.65 lpm/m-squared   SVI    33 ml/beat/m-squared (lower limit normal 29)   PVR   2.8 mmHg/l/min (Wood units)   SVR   2064 dyne-sec-cm-5         PFT (3/18/2020):      TTE (3/13/2020, outside study):  · The study quality was good.   · The left ventricle is normal in size. Top normal left ventricular wall   thickness. There are no segmental wall motion abnormalities. The left   ventricular ejection fraction is 55-60% by visual estimate and 3D   echocardiography. Normal left ventricular ejection fraction.   · There is grade I (mild) LV diastolic dysfunction.   · The right ventricle is normal in size. There is normal function of the   right ventricle.   · The right atrium is mildly dilated.   · There is mild mitral valve anterior leaflet thickening. There is mild   mitral valve leaflet calcification. There is flattening of the mitral   leaflets without raphael prolapse. There is trace mitral regurgitation.   · The aortic valve is trileaflet. There is mild thickening of the aortic   valve. There is mild calcification of the aortic valve. There is no aortic   /stenosis. There is trace aortic regurgitation.   · There is mild tricuspid valve leaflet thickening. There is mild to   moderate tricuspid regurgitation. The pulmonary artery systolic pressure   is moderately elevated. Pulmonary artery systolic pressure measures 50   mmHg. There is mid-systolic  notching of the RVOT VTI consistent with   elevated pulmonary vascular resistance.   · The inferior vena cava is normal in size (diameter <21 mm) and decreases   >50% in size with inspiration, suggesting a normal right atrial pressure   of 3 mmHg (range 0-5 mm Hg).   · Trivial loculated pericardial effusion present adjacent to the right   ventricle and adjacent to the right atrium.          Assessment / Plan:     1. Pulmonary Hypertension (WHO Group I, NYHA/WHO FC III): Occurring in the background of Systemic Scleroderma with ILD. August 2022 TTE showed normal RV size and function, but progressive exertional decline, worsened DLCO and resting tachycardia suggest there may be progression of her pulmonary vascular disease and limitation of cardiac output. This was proven with 11/2022 RHC showing mean PA 35 mmHg (with PCWP 13 mmHg) and PVR 4.3 Wood units. Recent TTEs have shown appropriate RV:PA coupling with normal RV size and systolic function.     Intravascularly, she is euvolemic on exam today. She will continue Sildenafil 20 mg TID, and Macitentan 10 mg daily for combination therapy.     She was unable to complete 6-minute walk test today due to instability.     2. HTN: Controlled. She is continued on Amlodipine.     3. Systemic Scleroderma / Raynaud's Disease: Per Rheumatology (Dr. Trevizo).      4. ILD: Per Pulmonology and Rheumatology (Dr. Trevizo). She has not tolerated trials of Mycophenolate.     5. PE: She has had elevated probability on V/Q scans, but CTA Chest has consistently not shown evidence for acute or chronic thromboembolism. Apixaban has been discontinued.     6. CAD: Two-vessel non-obstructive disease on March 2024 CCTA. She is continued on Atorvastatin 40 mg daily.     7. Pre-procedural Cardiovascular Evaluation and Risk Stratification: She is proposed for biopsy of FDG-avid LLL lung mass with Dr. Rodriguez. There is recent CCTA showing that she does not have obstructive CAD. Her symptoms are at  "baseline, and she is compensated on exam. She can proceed without further cardiovascular testing at elevated, but acceptable risk. She should continue uninterrupted Sildenafil and Macitentan lita-procedurally. Would plan to administer mid-day Sildenafil dose when she is due for it.     Thank you for allowing me to participate in the care of your patient. I would like to see her in the office in 3 months.     Please do not hesitate to contact me with any questions or issues.     Sincerely,     Timothy Arvizu, DO  Cardiology / Heart Failure / Pulmonary Hypertension    [email or web address]    [phone]    [facility]  [facility]  [address]   [place] [id number]      Counseling and coordination of care consisted of more than 50% of this complex medical evaluation; numerous diagnostic and management options were considered and testing results were reviewed and discussed with the patient as best as able, during this encounter; the patient has multiple medical problems and is on numerous medications; the patient has a high risk of complications due to their cardiac disease, and all these resulted in the assigned \"complex medical decision-making\" characterization and coding of this patient encounter.  I spent  41 minutes on this date of service performing the following activities: obtaining history, performing examination, entering orders, documenting, preparing for visit, obtaining / reviewing records, providing counseling and education and independently reviewing study/studies.  "

## 2024-06-20 NOTE — LETTER
"October 3, 2024     Arielle Felix  [address]  [place] [address]    Patient: Arielle Felix  YOB: 1960  Date of Visit: 2024      Dear Dr. Ring:    Thank you for referring Arielle Felix to me for evaluation. Below are my notes for this consultation.    If you have questions, please do not hesitate to call me. I look forward to following your patient along with you.         Sincerely,        Timothy Arvizu DO        CC: No Recipients    Urmila Rasmussen PA C  10/3/2024  9:18 AM  Addendum  ADDENDUM: Pre- procedural Cardiovascular Assessment and Stratification Risk  Attention: Dr. Jose Ring     Ms. Felix is proposed for Biopsy 10/8/2024 by Dr. Bell. She denies anginal symptoms. There is no evidence of heart failure decompensation or unstable arrhythmia.     There is a recent CCTA showing that she does not have obstructive CAD. Her symptoms are at baseline, and she is compensated on exam. She can proceed without further cardiovascular testing at elevated, but acceptable risk. She should continue uninterrupted Sildenafil and Macitentan lita-procedurally. Would plan to administer mid-day Sildenafil dose when she is due for it.     If vasopressors are needed, recommend \"RV-friendly\" vasopressor strategy to preferentially raise SVR more than PVR (e.g., Vasopressin > Norepinephrine = Epinephrine > Dopamine >>> Phenylephrine, Ephedrine).  Judicious use of IV fluids, if needed.    Would avoid negative inotropic agents such as Propofol, if this can be accomplished.   Avoid bradycardia, especially lita-intubation, if this is needed  Okay to hold Clopidogrel up to 5 days prior.          Sincerely,   [place]          ----------------------------------------------------------------------------------------------------------------        Timothy Arvizu DO  2024  1:59 PM  Signed  I had the pleasure of seeing Arielle GUAJARDO Isidro (: 1960) in the " [facility] Heart Failure and Pulmonary Hypertension clinic on 6/20/2024. She presents today with her care giver, Sara.     Ms. Felix is a 63 y.o. female with a past medical history of Pulmonary HTN, HTN, Raynaud's Disease, Cutaneous Systemic Scleroderma (dx 1998), ILD, PE (3/2023). She is a patient of Dr. Nguyễn. She was previously followed by Dr. Jos Valdez at [facility]. Echocardiogram from 4/21/2022 showed LVEF: 55-60%, mild aortic valve thickening, pulmonary artery systolic pressure: 52 mmHg and trivial pericardial effusion. LHC and RHC (separate occasions) over the last 5-10 years which showed normal hemodynamics and normal coronaries. She had a RHC 9/02/2020 showing top-normal right sided filling pressures with mild pulmonary hypertension, top-normal PVR and normal pulmonary capillary wedge pressure. The cardiac index was normal, seen below.     [place]2022, [place] [facility] [place]. EKG: NSR without ischemic changes. hsTrop: 12->16, d-dimer: 0.56, CXR showed cardiomegaly and diffuse interstitial prominence.     Dr. Nguyễn ordered echocardiogram, which was completed on 8/22/2022 and showed estimated RVSP = 50-55 mmHg, normal-sized LV with normal LV systolic function. Estimated EF 55- 60%. Wall motion grossly normal, mild concentric left ventricular hypertrophy, grade I LV diastolic dysfunction, probably normal RV size and function.    At her initial visit on 10/11/2022, she reported that she felt very short of breath with minimal activity most of the time. She reports that this started about 1 year prior and had progressively gotten worse. She described PND and bendopnea. She reported that she experienced ankle swelling, worsened over the past year and usually worse towards the end of the night. She also reported some swelling in her abdomen. She reported daily palpations. I recommended a RHC which was done 11/28/2022 and showed: Normal right sided filling pressures. Mildly elevated  left sided filling pressures. Precapillary pulmonary hypertension with mean PA 36 mmHg.    She was hospitalized on 3/19/2023 at [facility] for PE diagnosed on V/Q scan. She reports that she had presented with left arm pain and heaviness. She states that she was started on Apixaban. One week after her last office visit (4/6/2023), [place] [facility] [place], epistaxis and hemoptysis. Echocardiogram showed: left ventricular cavity size normal with mild left ventricular hypertrophy and preserved systolic function. Estimated EF 65%. Chest CTA showed no evidence of PE; Apixaban was discontinued.     She had a repeat echocardiogram (6/2023) which showed: Normal left ventricular cavity size and mild concentric left ventricular hypertrophy. Normal systolic function. EF: 60%. Normal right ventricular structure and function. Mild tricuspid valve regurgitation with estimated RVSP: 55 mmHg. Compared to previous study from 4/14/2023, estimated right ventricular systolic pressure were higher.    At her last office visit on 7/25/2023, she reported that she was not sure if she received/started the Macitentan. She stated at the time we initiated it, her Pulmonologist also started her on a new medication (Mycophenolate), and she had significant GI intolerance with this. It was trialed at various doses but ultimately stopped. At that visit, I asked her to check if she had Macitentan and if she did to start it and monitor her SpO2.    She underwent V/Q scan in September 2023, which showed intermediate-to-high probability of pulmonary embolic disease, as a result she completed CTA Chest PE which showed: no evidence for filling defect or vessel cutoff to suggest pulmonary embolism. Enlargement of the pulmonary arteries compatible with pulmonary arterial hypertension was seen.    She reports she started Macitentan around August or September 2023. She stated that she had not been checking her SpO2. She reports she  did not notice any difference since starting it. She reported she had been feeling more shortness of breath at times with ADLs.     She was last seen in the office in February 2024.    She presented to [facility] on 3/3/2024 with chest pressure, palpitations and nausea. She was noted to have hypoxia and tachypnea at presentation. She was found to have moderate-to-large pericardial effusion without tamponade. She was started on Colchicine 0.6 mg BID on 3/4/2024 and when she did not improve symptomatically, Prednisone 20 mg daily was added on 3/5/2024. On 3/7/2024, she developed multiple episodes of diarrhea and Colchicine dose was decreased to 0.3 mg. Coronary CTA, done as part of the ischemic workup, showed moderate 2 vessel CAD. She was recommended to begin Atorvastatin 20 mg daily and Clopidogrel 75 mg daily. She initially refused those two medications, but eventually agreed to take Atorvastatin.      She was discharged on Colchicine 0.3 mg for at least 3 months and Prednisone 20 mg for a total two week course until 3/19/2024, then decrease the dose to 10 mg daily for 2 weeks until 4/2/2024, then decrease dose to 5 mg daily for 2 weeks until 4/16/2024. She reports she tapered off Prednisone as instructed and has continued to take Colchicine as prescribed. She completed an echocardiogram (4/10/2024) which showed: Small-to-moderate sized, circumferential pericardial effusion. No echocardiographic evidence for cardiac tamponade. Compared to previous study from 3/4/2024, no significant change. Pericardial effusion size was similar, estimated right atrial pressure lower.      She presented to [facility] (4/16/2024) with increased pleuritic chest pain. On presentation to the ER, she was noted to be hypertensive and tachycardic. Labs showed mildly elevated troponin, elevated BNP and leukocytosis. CT angiography of the chest did not show evidence of PE. It was read as large pericardial effusion, evidence of emphysema, evidence of  pulmonary hypertension, 16 mm left lobe lung mass.       [place] [facility] [place] at the request of her Rheumatologist. During her visit there, she

## 2024-06-22 ENCOUNTER — APPOINTMENT (EMERGENCY)
Dept: RADIOLOGY | Facility: HOSPITAL | Age: 64
End: 2024-06-22
Payer: COMMERCIAL

## 2024-06-22 ENCOUNTER — HOSPITAL ENCOUNTER (EMERGENCY)
Facility: HOSPITAL | Age: 64
Discharge: HOME | End: 2024-06-22
Attending: EMERGENCY MEDICINE
Payer: COMMERCIAL

## 2024-06-22 VITALS
TEMPERATURE: 98.2 F | SYSTOLIC BLOOD PRESSURE: 155 MMHG | RESPIRATION RATE: 19 BRPM | HEART RATE: 98 BPM | OXYGEN SATURATION: 94 % | DIASTOLIC BLOOD PRESSURE: 74 MMHG

## 2024-06-22 DIAGNOSIS — R00.0 SINUS TACHYCARDIA: ICD-10-CM

## 2024-06-22 DIAGNOSIS — R07.9 CHEST PAIN, UNSPECIFIED TYPE: Primary | ICD-10-CM

## 2024-06-22 LAB
ALBUMIN SERPL-MCNC: 3.7 G/DL (ref 3.5–5.7)
ALP SERPL-CCNC: 75 IU/L (ref 34–125)
ALT SERPL-CCNC: 7 IU/L (ref 7–52)
ANION GAP SERPL CALC-SCNC: 12 MEQ/L (ref 3–15)
AST SERPL-CCNC: 17 IU/L (ref 13–39)
BASOPHILS # BLD: 0.06 K/UL (ref 0.01–0.1)
BASOPHILS NFR BLD: 0.6 %
BILIRUB SERPL-MCNC: 0.3 MG/DL (ref 0.3–1.2)
BNP SERPL-MCNC: 150 PG/ML
BUN SERPL-MCNC: 14 MG/DL (ref 7–25)
CALCIUM SERPL-MCNC: 9.5 MG/DL (ref 8.6–10.3)
CHLORIDE SERPL-SCNC: 109 MEQ/L (ref 98–107)
CO2 SERPL-SCNC: 20 MEQ/L (ref 21–31)
CREAT SERPL-MCNC: 1 MG/DL (ref 0.6–1.2)
D DIMER PPP IA.FEU-MCNC: 0.61 UG/ML FEU (ref 0–0.5)
DIFFERENTIAL METHOD BLD: ABNORMAL
EGFRCR SERPLBLD CKD-EPI 2021: >60 ML/MIN/1.73M*2
EOSINOPHIL # BLD: 0.03 K/UL (ref 0.04–0.36)
EOSINOPHIL NFR BLD: 0.3 %
ERYTHROCYTE [DISTWIDTH] IN BLOOD BY AUTOMATED COUNT: 16.3 % (ref 11.7–14.4)
GLUCOSE SERPL-MCNC: 140 MG/DL (ref 70–99)
HCT VFR BLD AUTO: 34 % (ref 35–45)
HGB BLD-MCNC: 9.9 G/DL (ref 11.8–15.7)
IMM GRANULOCYTES # BLD AUTO: 0.04 K/UL (ref 0–0.08)
IMM GRANULOCYTES NFR BLD AUTO: 0.4 %
LIPASE SERPL-CCNC: 56 U/L (ref 11–82)
LYMPHOCYTES # BLD: 1.21 K/UL (ref 1.2–3.5)
LYMPHOCYTES NFR BLD: 12.3 %
MAGNESIUM SERPL-MCNC: 1.8 MG/DL (ref 1.8–2.5)
MCH RBC QN AUTO: 25.1 PG (ref 28–33.2)
MCHC RBC AUTO-ENTMCNC: 29.1 G/DL (ref 32.2–35.5)
MCV RBC AUTO: 86.1 FL (ref 83–98)
MONOCYTES # BLD: 0.3 K/UL (ref 0.28–0.8)
MONOCYTES NFR BLD: 3.1 %
NEUTROPHILS # BLD: 8.19 K/UL (ref 1.7–7)
NEUTS SEG NFR BLD: 83.3 %
NRBC BLD-RTO: 0 %
PDW BLD AUTO: 11.8 FL (ref 9.4–12.3)
PLATELET # BLD AUTO: 359 K/UL (ref 150–369)
POTASSIUM SERPL-SCNC: 3.8 MEQ/L (ref 3.5–5.1)
PROT SERPL-MCNC: 7.6 G/DL (ref 6–8.2)
RBC # BLD AUTO: 3.95 M/UL (ref 3.93–5.22)
SODIUM SERPL-SCNC: 141 MEQ/L (ref 136–145)
TROPONIN I SERPL HS-MCNC: 16.3 PG/ML
TROPONIN I SERPL HS-MCNC: 16.6 PG/ML
TSH SERPL DL<=0.05 MIU/L-ACNC: 3.25 MIU/L (ref 0.34–5.6)
WBC # BLD AUTO: 9.83 K/UL (ref 3.8–10.5)

## 2024-06-22 PROCEDURE — 85379 FIBRIN DEGRADATION QUANT: CPT | Performed by: PHYSICIAN ASSISTANT

## 2024-06-22 PROCEDURE — 93005 ELECTROCARDIOGRAM TRACING: CPT | Performed by: EMERGENCY MEDICINE

## 2024-06-22 PROCEDURE — 84484 ASSAY OF TROPONIN QUANT: CPT | Mod: 91 | Performed by: EMERGENCY MEDICINE

## 2024-06-22 PROCEDURE — 83690 ASSAY OF LIPASE: CPT | Performed by: PHYSICIAN ASSISTANT

## 2024-06-22 PROCEDURE — 71045 X-RAY EXAM CHEST 1 VIEW: CPT

## 2024-06-22 PROCEDURE — 36415 COLL VENOUS BLD VENIPUNCTURE: CPT

## 2024-06-22 PROCEDURE — 83880 ASSAY OF NATRIURETIC PEPTIDE: CPT | Performed by: PHYSICIAN ASSISTANT

## 2024-06-22 PROCEDURE — 85025 COMPLETE CBC W/AUTO DIFF WBC: CPT

## 2024-06-22 PROCEDURE — 80053 COMPREHEN METABOLIC PANEL: CPT

## 2024-06-22 PROCEDURE — 84484 ASSAY OF TROPONIN QUANT: CPT

## 2024-06-22 PROCEDURE — 84484 ASSAY OF TROPONIN QUANT: CPT | Mod: 91

## 2024-06-22 PROCEDURE — 85025 COMPLETE CBC W/AUTO DIFF WBC: CPT | Performed by: EMERGENCY MEDICINE

## 2024-06-22 PROCEDURE — 80053 COMPREHEN METABOLIC PANEL: CPT | Performed by: EMERGENCY MEDICINE

## 2024-06-22 PROCEDURE — 99283 EMERGENCY DEPT VISIT LOW MDM: CPT | Mod: 25

## 2024-06-22 PROCEDURE — 83735 ASSAY OF MAGNESIUM: CPT | Performed by: PHYSICIAN ASSISTANT

## 2024-06-22 PROCEDURE — 84443 ASSAY THYROID STIM HORMONE: CPT | Performed by: PHYSICIAN ASSISTANT

## 2024-06-22 PROCEDURE — 93005 ELECTROCARDIOGRAM TRACING: CPT

## 2024-06-22 ASSESSMENT — ENCOUNTER SYMPTOMS
SHORTNESS OF BREATH: 1
PALPITATIONS: 1
DYSURIA: 0
ABDOMINAL PAIN: 0
FEVER: 0
VOMITING: 0
COUGH: 0
DIARRHEA: 0

## 2024-06-22 NOTE — ED PROVIDER NOTES
Emergency Medicine Note  HPI   HISTORY OF PRESENT ILLNESS     62 y/o F with PMH interstitial lung disease, pulmonary hypertension, scleroderma, lupus, raynaud's disease presents today for evaluation of chest pain. Pt states at 0200 awoke from sleep with chest pain described as object sitting on her chest with difficulty breathing. Symptoms constant since and concerned.       History provided by:  Patient  Chest Pain  Associated symptoms: palpitations and shortness of breath    Associated symptoms: no abdominal pain, no cough, no fever and no vomiting          Patient History   PAST HISTORY     Reviewed from Nursing Triage:       Past Medical History:   Diagnosis Date    De Quervain's tenosynovitis     Essential (primary) hypertension     Follicular carcinoma of thyroid (CMS/HCC)     Gastro-esophageal reflux     Hand ulceration (CMS/HCC)     Hyperlipidemia, unspecified     Interstitial lung disease (CMS/HCC)     Leg ulcer (CMS/HCC)     Lung nodule     Lupus (CMS/HCC)     Osteomyelitis of hand (CMS/HCC)     Osteoporosis     Pulmonary hypertension (CMS/HCC)     Raynaud's disease     Severe    Reflux esophagitis     Scleroderma (CMS/HCC)     Screening mammogram for breast cancer 2021    BI-RADS category: 1 - Negative (care everywhere @ [facility])       Past Surgical History:   Procedure Laterality Date    CARDIAC CATHETERIZATION      COLONOSCOPY      HERNIA REPAIR         Family History   Problem Relation Age of Onset    Heart disease Biological Brother         stent    Breast cancer Niece     Cervical cancer Neg Hx     Uterine cancer Neg Hx     Colon cancer Neg Hx     Ovarian cancer Neg Hx        Social History     Tobacco Use    Smoking status: Former     Types: Cigarettes     Quit date: 9/15/2005     Years since quittin.7    Smokeless tobacco: Never    Tobacco comments:     Would smoke 1/2 of 1/2 PPD, quit in    Vaping Use    Vaping Use: Never used   Substance Use Topics    Alcohol use: Never    Drug use:  Never         Review of Systems   REVIEW OF SYSTEMS     Review of Systems   Constitutional:  Negative for fever.   Respiratory:  Positive for shortness of breath. Negative for cough.    Cardiovascular:  Positive for chest pain and palpitations.   Gastrointestinal:  Negative for abdominal pain, diarrhea and vomiting.   Genitourinary:  Negative for dysuria.   Neurological:  Negative for syncope.         VITALS     ED Vitals      Date/Time Temp Pulse Resp BP SpO2 [facility]   06/22/24 1701 -- 98 19 155/74 94 % SOT   06/22/24 1609 -- 100 20 151/77 95 % SOT   06/22/24 1401 -- 115 21 -- 96 % SOT   06/22/24 1324 36.8 °C (98.2 °F) -- -- 176/77 98 % MB   06/22/24 1324 -- 111 21 -- -- SOT          Pulse Ox %: 98 % (06/22/24 1334)  Pulse Ox Interpretation: Normal (06/22/24 1334)           Physical Exam   PHYSICAL EXAM     Physical Exam  Vitals and nursing note reviewed.   Constitutional:       General: She is not in acute distress.     Appearance: She is well-developed.   HENT:      Head: Normocephalic.   Cardiovascular:      Rate and Rhythm: Regular rhythm. Tachycardia present.      Pulses: Normal pulses.   Pulmonary:      Effort: Pulmonary effort is normal.      Breath sounds: Normal breath sounds.   Abdominal:      Palpations: Abdomen is soft.      Tenderness: There is no abdominal tenderness. There is no guarding.   Musculoskeletal:      Cervical back: Normal range of motion and neck supple.      Right lower leg: No edema.      Left lower leg: No edema.   Skin:     General: Skin is warm and dry.   Neurological:      General: No focal deficit present.      Mental Status: She is alert and oriented to person, place, and time.           PROCEDURES     Procedures     DATA     Results       Procedure Component Value Units Date/Time    B-type natriuretic peptide [[id number]]  (Abnormal) Collected: 06/22/24 1346    Specimen: Blood, Venous Updated: 06/22/24 1648      pg/mL     D-dimer, quantitative [[id number]]  (Abnormal) Collected:  06/22/24 1530    Specimen: Blood, Venous Updated: 06/22/24 1646     D-Dimer, Quant 0.61 ug/mL FEU      Comment: Suggested Age Related Reference Range: 0.00 - 0.63  The D-Dimer assay can be used as an aid in the diagnosis of DVT or PE. The test can not be used by itself to exclude DVT or PE. When used as a diagnostic aid, the cutoff value is the same as the reference range: <0.5 ug/ml FEU.       Lipase [[id number]]  (Normal) Collected: 06/22/24 1346    Specimen: Blood, Venous Updated: 06/22/24 1603     Lipase 56 U/L     TSH w reflex FT4 [[id number]]  (Normal) Collected: 06/22/24 1346    Specimen: Blood, Venous Updated: 06/22/24 1449     TSH 3.25 mIU/L     HS Troponin I (with 2 hour reflex) [[id number]]  (Abnormal) Collected: 06/22/24 1346    Specimen: Blood, Venous Updated: 06/22/24 1439     High Sens Troponin I 16.6 pg/mL     Comprehensive metabolic panel [[id number]]  (Abnormal) Collected: 06/22/24 1346    Specimen: Blood, Venous Updated: 06/22/24 1436     Sodium 141 mEQ/L      Potassium 3.8 mEQ/L      Comment: Results obtained on plasma. Plasma Potassium values may be up to 0.4 mEQ/L less than serum values. The differences may be greater for patients with high platelet or white cell counts.        Chloride 109 mEQ/L      CO2 20 mEQ/L      BUN 14 mg/dL      Creatinine 1.0 mg/dL      Glucose 140 mg/dL      Calcium 9.5 mg/dL      AST (SGOT) 17 IU/L      ALT (SGPT) 7 IU/L      Alkaline Phosphatase 75 IU/L      Total Protein 7.6 g/dL      Comment: Test performed on plasma which typically contains approximately 0.4 g/dL more protein than serum.        Albumin 3.7 g/dL      Bilirubin, Total 0.3 mg/dL      eGFR >60.0 mL/min/1.73m*2      Comment: Calculation based on the Chronic Kidney Disease Epidemiology Collaboration (CKD-EPI) equation refit without adjustment for race.        Anion Gap 12 mEQ/L     Magnesium [[id number]]  (Normal) Collected: 06/22/24 1346    Specimen: Blood, Venous Updated: 06/22/24 1436     Magnesium 1.8  mg/dL     CBC and differential [[id number]]  (Abnormal) Collected: 06/22/24 1346    Specimen: Blood, Venous Updated: 06/22/24 1414     WBC 9.83 K/uL      RBC 3.95 M/uL      Hemoglobin 9.9 g/dL      Hematocrit 34.0 %      MCV 86.1 fL      MCH 25.1 pg      MCHC 29.1 g/dL      RDW 16.3 %      Platelets 359 K/uL      MPV 11.8 fL      Differential Type Auto     nRBC 0.0 %      Immature Granulocytes 0.4 %      Neutrophils 83.3 %      Lymphocytes 12.3 %      Monocytes 3.1 %      Eosinophils 0.3 %      Basophils 0.6 %      Immature Granulocytes, Absolute 0.04 K/uL      Neutrophils, Absolute 8.19 K/uL      Lymphocytes, Absolute 1.21 K/uL      Monocytes, Absolute 0.30 K/uL      Eosinophils, Absolute 0.03 K/uL      Basophils, Absolute 0.06 K/uL             Imaging Results              X-RAY CHEST 1 VIEW (Final result)  Result time 06/22/24 14:44:05      Final result                   Impression:    IMPRESSION: Diffuse prominence of the vascular and interstitial markings again  may reflect an ongoing component of vascular congestion and interstitial edema.  No overt airspace edema is demonstrated.. There may be some minimal left basilar  atelectasis. There is stable enlargement of the cardiac silhouette. Clinical  correlation advised. See comment.               Narrative:    CLINICAL HISTORY: Chest pain    COMMENT: AP portable erect frontal view chest was obtained. There are previous  radiographs with the most recent of May 2024.    Lungs remain well-expanded with diffuse coarseness of the vascular and  interstitial markings. This may reflect a component of ongoing vascular  congestion and edema. There is no new overt airspace edema The central diaphragm  is slightly elevated with some suggested subtle retrocardiac opacity which may  reflect atelectasis. There is no pleural air. There is some biapical pleural  thickening, left again greater than right. There is ongoing large cardiac  silhouette. There is slight tortuosity of the  aorta as before with some  calcification at the arch. There is no acute overlying osseous or soft tissue  abnormality. Overlying monitoring leads and wires are present.                                      ECG 12 lead    (Results Pending)       Scoring tools                                  ED Course & MDM   MDM / ED COURSE / CLINICAL IMPRESSION / DISPO     Medical Decision Making  Amount and/or Complexity of Data Reviewed  Labs: ordered. Decision-making details documented in ED Course.  Radiology: ordered.  ECG/medicine tests: ordered.        ED Course as of 06/22/24 1802   Sat Jun 22, 2024   1346 Pt presents today for evaluation of chest pain. Pt sinus tachycardic on monitor. EKG with ST/T changes. Grossly unchanged from priors   O2 sat 98% on RA  Plan for labs, CXR [NH]   1528 High Sens Troponin I(!): 16.6  Repeat pending [NH]   1528 CXR grossly unchanged from priors  Will add bnp as cannot r/u pulmonary edema but suspect interstitial findings related to chronic lung disease [NH]   1658 D-Dimer, Quant(!): 0.61  Age adjusted d dimer normal  Doubt PE [NH]   1658 High Sens Troponin I(!): 16.3  Delta flat  [NH]   1658 BNP(!): 150  Essentially normal  No LE edema  Doubt CHF [NH]   1716 Pt now in NSR  Pt with 2 delta flat troponin's. Doubt ACS  Will d/c with PCP and cardiology f/u [NH]      ED Course User Index  [NH] Mandy Ramos PA C     Clinical Impression      Chest pain, unspecified type   Sinus tachycardia     _________________       ED Disposition   Discharge                       Mandy Ramos PA C  06/22/24 1802

## 2024-06-22 NOTE — ED ATTESTATION NOTE
The patient was evaluated and managed by the physician assistant.    DO Olvin Chatman Steven K, DO  06/22/24 1709

## 2024-06-22 NOTE — ED ATTESTATION NOTE
I have personally seen and examined Arielle Felix.  I was involved in the care and medical decision making for this patient.    I reviewed and agree with physician assistant / nurse practitioner’s assessment and plan of care; any exceptions are documented below.      My focused history, examination, assessment and plan of care of Arielle Felix is as follows:    Brief History:  HPI  64 yo F with SOB/ CP in middle of night.  Has had this before.       Focused Physical Exam:  Physical Exam      Lungs clear bl    Assessment / Plan:    Acute CP/ SOB  Doubt MI/ PE.  Probably anxiety.     Medical Decision Making  Amount and/or Complexity of Data Reviewed  Labs: ordered. Decision-making details documented in ED Course.  Radiology: ordered.  ECG/medicine tests: ordered.        I was physically present for the key/critical portions of the following procedures:  Procedures           Aneesh Bah DO  06/22/24 0954

## 2024-06-24 LAB
ATRIAL RATE: 125
P AXIS: 60
PR INTERVAL: 132
QRS DURATION: 76
QT INTERVAL: 320
QTC CALCULATION(BAZETT): 461
R AXIS: 47
T WAVE AXIS: 132
VENTRICULAR RATE: 125

## 2024-06-24 PROCEDURE — 93010 ELECTROCARDIOGRAM REPORT: CPT | Performed by: INTERNAL MEDICINE

## 2024-07-09 ENCOUNTER — HOSPITAL ENCOUNTER (OUTPATIENT)
Facility: HOSPITAL | Age: 64
Setting detail: HOSPITAL OUTPATIENT SURGERY
End: 2024-07-09
Attending: INTERNAL MEDICINE | Admitting: INTERNAL MEDICINE
Payer: COMMERCIAL

## 2024-07-10 ENCOUNTER — TELEPHONE (OUTPATIENT)
Dept: SCHEDULING | Facility: CLINIC | Age: 64
End: 2024-07-10
Payer: COMMERCIAL

## 2024-07-10 NOTE — TELEPHONE ENCOUNTER
Urgent Cardiac Clearance     Name of caller: Idalmis    Relationship to patient:  office staff    Name of patient: Arielle GUAJARDO Ingeny    Insurance Name: [employer]    Name of physician: Timothy Arvizu DO    Date of Procedure/Surgery: 7/19/24    Type of Procedure/Surgery: ION Navigational Bronchoscopy with Radial Probe and Fluoroscopy + EBUS    Name of surgeon: Yancy Barba DO.     Office contact number: [phone]    Office fax number: [phone]    Addendums  Is patient able to be cleared based on last appt on 6/20/24  If unable to clear patient, please contact patient at [phone]      Additional notes:

## 2024-07-11 NOTE — PROGRESS NOTES
"ADDENDUM: Pre- procedural Cardiovascular Assessment and Stratification Risk  Attention: Dr. Jose Ring     Ms. Felix is proposed for Biopsy 10/8/2024 by Dr. Bell. She denies anginal symptoms. There is no evidence of heart failure decompensation or unstable arrhythmia.     There is a recent CCTA showing that she does not have obstructive CAD. Her symptoms are at baseline, and she is compensated on exam. She can proceed without further cardiovascular testing at elevated, but acceptable risk. She should continue uninterrupted Sildenafil and Macitentan lita-procedurally. Would plan to administer mid-day Sildenafil dose when she is due for it.     If vasopressors are needed, recommend \"RV-friendly\" vasopressor strategy to preferentially raise SVR more than PVR (e.g., Vasopressin > Norepinephrine = Epinephrine > Dopamine >>> Phenylephrine, Ephedrine).  Judicious use of IV fluids, if needed.    Would avoid negative inotropic agents such as Propofol, if this can be accomplished.   Avoid bradycardia, especially lita-intubation, if this is needed  Okay to hold Clopidogrel up to 5 days prior.          Sincerely,   RAS Marcos          ----------------------------------------------------------------------------------------------------------------      "

## 2024-07-12 ENCOUNTER — TELEPHONE (OUTPATIENT)
Dept: SCHEDULING | Facility: CLINIC | Age: 64
End: 2024-07-12
Payer: COMMERCIAL

## 2024-07-12 DIAGNOSIS — I31.39 PERICARDIAL EFFUSION: Primary | ICD-10-CM

## 2024-07-12 NOTE — TELEPHONE ENCOUNTER
SM: TY     She doesn't have a BP cuff. She denies lightheadedness. I advised that RAS Rasmussen would like her to get a limited echo and we will call her once she is able to schedule it. She also said she has a biopsy next Friday. She will try to find her pulse ox and check a resting reading.     PSRs: Does the pt's insurance require a PA for the limited echo just ordered? TY

## 2024-07-12 NOTE — TELEPHONE ENCOUNTER
Rex calling from [facility] pharmacy calling to report a side effect to may be a medication.     Pt having a high heart rate on a daily basis and lasting longer period of time     Rex reached can be ph# [phone] then opt 1

## 2024-07-12 NOTE — TELEPHONE ENCOUNTER
How is her blood pressure? Any dizziness/lightheadedness?     Lets get a limited echocardiogram. Will order.

## 2024-07-12 NOTE — TELEPHONE ENCOUNTER
Could you please find out which medication they are calling in reference too ? Frustrating they didn’t say

## 2024-07-12 NOTE — TELEPHONE ENCOUNTER
I called and s/w the pt. She reports her heart has been beating very fast since prior to her 6/22 ER visit. She said it came and went but is now continuous. She also notes skipped beats.     She doesn't have a BP cuff but does have a pulseox and will try to find it to determine her HR.     She denies CP, LE edema, abdominal bloating. She reports chronic unchanged BOWSER and uses supplemental O2 most of the time. She reports she is eating and drinking normally for her, though she does at times have a poor appetite. She takes Ensure at least 3 times per day. She does have central air.     No scale available.     She thinks she may have run out of Prednisone yesterday. It was prescribed 5 mg daily by Dr. Wasserman. She is unsure if it should continue or not.     She denies cough medicine, NSAID use, allergy meds or inhalers.     She confirmed use of Opsumit and Sildenafil.     She is no longer on colchicine for a pericardial effusion.     Pulse Readings from Last 10 Encounters:   06/22/24 98   06/20/24 (!) 114   05/24/24 83   04/19/24 99   03/16/24 86   02/01/24 (!) 104   07/25/23 (!) 111   07/25/23 (!) 107   06/21/23 98   04/19/23 82     BLAS/SM: Pls advise. TY

## 2024-07-17 ENCOUNTER — TELEPHONE (OUTPATIENT)
Dept: SCHEDULING | Facility: CLINIC | Age: 64
End: 2024-07-17
Payer: COMMERCIAL

## 2024-07-17 NOTE — TELEPHONE ENCOUNTER
I called and s/w the pt and advised that she can schedule her echo now. I provided her w/ the [facility] imaging center's number.      It appears her biopsy for the 19th has been canceled.

## 2024-07-17 NOTE — TELEPHONE ENCOUNTER
Pt called to make appt for echo    Scheduled 7/19    Pt was also crying at time of called, I asked what was wrong and she stated that she has been in pain, specifically chest pain    Offered triage and transferred     Can be reached at [phone]

## 2024-07-17 NOTE — TELEPHONE ENCOUNTER
Nolberto pt - Last OV 6/20/24    Hx:  Pulm HTN Interstitial Lung  disease Lung mass HTN  Right vent Dysfunction CAD     Pt calling crying very upset about still having chest pain which is in center of chest around to back.        Increases with deep breath.  Pt SOB while talking and upset/crying.      Pt upset that she has to wait until Friday for Echo to be done to see what is going on with this chest pain.     RN has the pt take some slow deep breaths to calm down which she did.  P.O. 88-97% .   No daily weights or BP done in home.    She states that over the last two days the chest pain has increase and she is not feeling good.  Instructed pt that if the chest pain is worst or different need to proceed to ED to be evaluated.      Pt will have family bring her over to [facility].      Pt will have dgtr cell with her [phone]

## 2024-07-19 ENCOUNTER — APPOINTMENT (EMERGENCY)
Dept: RADIOLOGY | Facility: HOSPITAL | Age: 64
DRG: 291 | End: 2024-07-19
Attending: EMERGENCY MEDICINE
Payer: COMMERCIAL

## 2024-07-19 ENCOUNTER — HOSPITAL ENCOUNTER (INPATIENT)
Facility: HOSPITAL | Age: 64
LOS: 3 days | Discharge: HOME HEALTH | DRG: 291 | End: 2024-07-23
Attending: EMERGENCY MEDICINE | Admitting: STUDENT IN AN ORGANIZED HEALTH CARE EDUCATION/TRAINING PROGRAM
Payer: COMMERCIAL

## 2024-07-19 ENCOUNTER — TELEPHONE (OUTPATIENT)
Dept: CARDIOLOGY | Facility: CLINIC | Age: 64
End: 2024-07-19
Payer: COMMERCIAL

## 2024-07-19 ENCOUNTER — HOSPITAL ENCOUNTER (OUTPATIENT)
Dept: CARDIOLOGY | Facility: CLINIC | Age: 64
Discharge: HOME | End: 2024-07-19
Attending: PHYSICIAN ASSISTANT
Payer: COMMERCIAL

## 2024-07-19 VITALS
HEIGHT: 66 IN | SYSTOLIC BLOOD PRESSURE: 154 MMHG | WEIGHT: 113 LBS | DIASTOLIC BLOOD PRESSURE: 80 MMHG | BODY MASS INDEX: 18.16 KG/M2

## 2024-07-19 DIAGNOSIS — R07.9 CHEST PAIN, UNSPECIFIED TYPE: Primary | ICD-10-CM

## 2024-07-19 DIAGNOSIS — I31.39 PERICARDIAL EFFUSION: ICD-10-CM

## 2024-07-19 DIAGNOSIS — J90 PLEURAL EFFUSION: ICD-10-CM

## 2024-07-19 DIAGNOSIS — R06.02 SHORTNESS OF BREATH: ICD-10-CM

## 2024-07-19 PROBLEM — J81.0 PULMONARY EDEMA, ACUTE (CMS/HCC): Status: ACTIVE | Noted: 2024-07-19

## 2024-07-19 LAB
ALBUMIN SERPL-MCNC: 4.1 G/DL (ref 3.5–5.7)
ALP SERPL-CCNC: 69 IU/L (ref 34–125)
ALT SERPL-CCNC: 7 IU/L (ref 7–52)
ANION GAP SERPL CALC-SCNC: 10 MEQ/L (ref 3–15)
AST SERPL-CCNC: 14 IU/L (ref 13–39)
ATRIAL RATE: 136
BASOPHILS # BLD: 0.05 K/UL (ref 0.01–0.1)
BASOPHILS NFR BLD: 0.5 %
BILIRUB SERPL-MCNC: 0.4 MG/DL (ref 0.3–1.2)
BNP SERPL-MCNC: 151 PG/ML
BUN SERPL-MCNC: 15 MG/DL (ref 7–25)
CALCIUM SERPL-MCNC: 9.7 MG/DL (ref 8.6–10.3)
CHLORIDE SERPL-SCNC: 110 MEQ/L (ref 98–107)
CO2 SERPL-SCNC: 23 MEQ/L (ref 21–31)
CREAT SERPL-MCNC: 1 MG/DL (ref 0.6–1.2)
DIFFERENTIAL METHOD BLD: ABNORMAL
EGFRCR SERPLBLD CKD-EPI 2021: >60 ML/MIN/1.73M*2
EOSINOPHIL # BLD: 0.08 K/UL (ref 0.04–0.36)
EOSINOPHIL NFR BLD: 0.8 %
ERYTHROCYTE [DISTWIDTH] IN BLOOD BY AUTOMATED COUNT: 18.3 % (ref 11.7–14.4)
GLUCOSE SERPL-MCNC: 93 MG/DL (ref 70–99)
HCT VFR BLD AUTO: 35.9 % (ref 35–45)
HGB BLD-MCNC: 10.9 G/DL (ref 11.8–15.7)
IMM GRANULOCYTES # BLD AUTO: 0.03 K/UL (ref 0–0.08)
IMM GRANULOCYTES NFR BLD AUTO: 0.3 %
LYMPHOCYTES # BLD: 1.55 K/UL (ref 1.2–3.5)
LYMPHOCYTES NFR BLD: 15.8 %
MCH RBC QN AUTO: 24.4 PG (ref 28–33.2)
MCHC RBC AUTO-ENTMCNC: 30.4 G/DL (ref 32.2–35.5)
MCV RBC AUTO: 80.5 FL (ref 83–98)
MONOCYTES # BLD: 0.62 K/UL (ref 0.28–0.8)
MONOCYTES NFR BLD: 6.3 %
NEUTROPHILS # BLD: 7.45 K/UL (ref 1.7–7)
NEUTS SEG NFR BLD: 76.3 %
NRBC BLD-RTO: 0 %
P AXIS: 59
PDW BLD AUTO: 11.3 FL (ref 9.4–12.3)
PLATELET # BLD AUTO: 212 K/UL (ref 150–369)
POTASSIUM SERPL-SCNC: 4 MEQ/L (ref 3.5–5.1)
PR INTERVAL: 132
PROT SERPL-MCNC: 8.2 G/DL (ref 6–8.2)
QRS DURATION: 74
QT INTERVAL: 296
QTC CALCULATION(BAZETT): 445
R AXIS: 23
RBC # BLD AUTO: 4.46 M/UL (ref 3.93–5.22)
SODIUM SERPL-SCNC: 143 MEQ/L (ref 136–145)
T WAVE AXIS: 101
T4 FREE SERPL-MCNC: 1.84 NG/DL (ref 0.58–1.64)
TROPONIN I SERPL HS-MCNC: 89.2 PG/ML
TROPONIN I SERPL HS-MCNC: 90.3 PG/ML
TSH SERPL DL<=0.05 MIU/L-ACNC: 0.33 MIU/L (ref 0.34–5.6)
VENTRICULAR RATE: 136
WBC # BLD AUTO: 9.78 K/UL (ref 3.8–10.5)

## 2024-07-19 PROCEDURE — 93325 DOPPLER ECHO COLOR FLOW MAPG: CPT | Performed by: INTERNAL MEDICINE

## 2024-07-19 PROCEDURE — 93010 ELECTROCARDIOGRAM REPORT: CPT | Performed by: INTERNAL MEDICINE

## 2024-07-19 PROCEDURE — 83880 ASSAY OF NATRIURETIC PEPTIDE: CPT | Performed by: EMERGENCY MEDICINE

## 2024-07-19 PROCEDURE — 96375 TX/PRO/DX INJ NEW DRUG ADDON: CPT | Mod: 59

## 2024-07-19 PROCEDURE — G0378 HOSPITAL OBSERVATION PER HR: HCPCS

## 2024-07-19 PROCEDURE — 96374 THER/PROPH/DIAG INJ IV PUSH: CPT | Mod: 59

## 2024-07-19 PROCEDURE — 71045 X-RAY EXAM CHEST 1 VIEW: CPT

## 2024-07-19 PROCEDURE — 71275 CT ANGIOGRAPHY CHEST: CPT

## 2024-07-19 PROCEDURE — 84439 ASSAY OF FREE THYROXINE: CPT | Performed by: EMERGENCY MEDICINE

## 2024-07-19 PROCEDURE — 63600105 HC IODINE BASED CONTRAST: Mod: JW | Performed by: EMERGENCY MEDICINE

## 2024-07-19 PROCEDURE — 99223 1ST HOSP IP/OBS HIGH 75: CPT | Performed by: STUDENT IN AN ORGANIZED HEALTH CARE EDUCATION/TRAINING PROGRAM

## 2024-07-19 PROCEDURE — 63600000 HC DRUGS/DETAIL CODE: Performed by: EMERGENCY MEDICINE

## 2024-07-19 PROCEDURE — 85025 COMPLETE CBC W/AUTO DIFF WBC: CPT | Performed by: EMERGENCY MEDICINE

## 2024-07-19 PROCEDURE — 80053 COMPREHEN METABOLIC PANEL: CPT | Performed by: EMERGENCY MEDICINE

## 2024-07-19 PROCEDURE — 99285 EMERGENCY DEPT VISIT HI MDM: CPT | Mod: 25

## 2024-07-19 PROCEDURE — 36415 COLL VENOUS BLD VENIPUNCTURE: CPT | Performed by: EMERGENCY MEDICINE

## 2024-07-19 PROCEDURE — 93321 DOPPLER ECHO F-UP/LMTD STD: CPT | Performed by: INTERNAL MEDICINE

## 2024-07-19 PROCEDURE — 84443 ASSAY THYROID STIM HORMONE: CPT | Performed by: EMERGENCY MEDICINE

## 2024-07-19 PROCEDURE — 1124F ACP DISCUSS-NO DSCNMKR DOCD: CPT | Performed by: STUDENT IN AN ORGANIZED HEALTH CARE EDUCATION/TRAINING PROGRAM

## 2024-07-19 PROCEDURE — 84484 ASSAY OF TROPONIN QUANT: CPT | Mod: 91 | Performed by: EMERGENCY MEDICINE

## 2024-07-19 PROCEDURE — 93005 ELECTROCARDIOGRAM TRACING: CPT | Performed by: EMERGENCY MEDICINE

## 2024-07-19 PROCEDURE — 84484 ASSAY OF TROPONIN QUANT: CPT | Performed by: EMERGENCY MEDICINE

## 2024-07-19 PROCEDURE — 93308 TTE F-UP OR LMTD: CPT | Performed by: INTERNAL MEDICINE

## 2024-07-19 RX ORDER — FUROSEMIDE 10 MG/ML
20 INJECTION INTRAMUSCULAR; INTRAVENOUS ONCE
Status: COMPLETED | OUTPATIENT
Start: 2024-07-19 | End: 2024-07-19

## 2024-07-19 RX ORDER — CHOLECALCIFEROL (VITAMIN D3) 25 MCG
1000 TABLET ORAL DAILY
Status: DISCONTINUED | OUTPATIENT
Start: 2024-07-20 | End: 2024-07-24 | Stop reason: HOSPADM

## 2024-07-19 RX ORDER — DIPHENHYDRAMINE HCL 50 MG/ML
VIAL (ML) INJECTION
Status: DISPENSED
Start: 2024-07-19 | End: 2024-07-20

## 2024-07-19 RX ORDER — DIPHENHYDRAMINE HCL 50 MG/ML
50 VIAL (ML) INJECTION ONCE
Status: COMPLETED | OUTPATIENT
Start: 2024-07-19 | End: 2024-07-19

## 2024-07-19 RX ORDER — PANTOPRAZOLE SODIUM 20 MG/1
20 TABLET, DELAYED RELEASE ORAL DAILY
COMMUNITY
End: 2024-09-03 | Stop reason: HOSPADM

## 2024-07-19 RX ORDER — ATORVASTATIN CALCIUM 40 MG/1
40 TABLET, FILM COATED ORAL
Status: DISCONTINUED | OUTPATIENT
Start: 2024-07-20 | End: 2024-07-24 | Stop reason: HOSPADM

## 2024-07-19 RX ORDER — SILDENAFIL CITRATE 20 MG/1
20 TABLET ORAL 3 TIMES DAILY
Status: DISCONTINUED | OUTPATIENT
Start: 2024-07-19 | End: 2024-07-24 | Stop reason: HOSPADM

## 2024-07-19 RX ORDER — LEVOFLOXACIN 5 MG/ML
750 INJECTION, SOLUTION INTRAVENOUS ONCE
Status: DISCONTINUED | OUTPATIENT
Start: 2024-07-19 | End: 2024-07-19

## 2024-07-19 RX ORDER — LORAZEPAM 2 MG/ML
0.5 INJECTION INTRAMUSCULAR ONCE
Status: COMPLETED | OUTPATIENT
Start: 2024-07-19 | End: 2024-07-19

## 2024-07-19 RX ORDER — DOCUSATE SODIUM 100 MG/1
100 CAPSULE, LIQUID FILLED ORAL DAILY PRN
Status: DISCONTINUED | OUTPATIENT
Start: 2024-07-19 | End: 2024-07-24 | Stop reason: HOSPADM

## 2024-07-19 RX ORDER — MUPIROCIN 20 MG/G
1 OINTMENT TOPICAL DAILY
Status: DISCONTINUED | OUTPATIENT
Start: 2024-07-20 | End: 2024-07-24 | Stop reason: HOSPADM

## 2024-07-19 RX ORDER — LIDOCAINE 560 MG/1
1 PATCH PERCUTANEOUS; TOPICAL; TRANSDERMAL DAILY PRN
Status: DISCONTINUED | OUTPATIENT
Start: 2024-07-19 | End: 2024-07-20

## 2024-07-19 RX ORDER — PANTOPRAZOLE SODIUM 20 MG/1
20 TABLET, DELAYED RELEASE ORAL DAILY
Status: DISCONTINUED | OUTPATIENT
Start: 2024-07-20 | End: 2024-07-24 | Stop reason: HOSPADM

## 2024-07-19 RX ORDER — LOPERAMIDE HYDROCHLORIDE 2 MG/1
2 CAPSULE ORAL EVERY 8 HOURS PRN
COMMUNITY

## 2024-07-19 RX ORDER — IOPAMIDOL 755 MG/ML
100 INJECTION, SOLUTION INTRAVASCULAR
Status: COMPLETED | OUTPATIENT
Start: 2024-07-19 | End: 2024-07-19

## 2024-07-19 RX ORDER — BENZONATATE 100 MG/1
100 CAPSULE ORAL 3 TIMES DAILY PRN
COMMUNITY
End: 2024-11-30 | Stop reason: HOSPADM

## 2024-07-19 RX ORDER — BENZONATATE 100 MG/1
100 CAPSULE ORAL 3 TIMES DAILY PRN
Status: DISCONTINUED | OUTPATIENT
Start: 2024-07-19 | End: 2024-07-24 | Stop reason: HOSPADM

## 2024-07-19 RX ORDER — LORAZEPAM 2 MG/ML
INJECTION INTRAMUSCULAR
Status: COMPLETED
Start: 2024-07-19 | End: 2024-07-19

## 2024-07-19 RX ORDER — AMLODIPINE BESYLATE 10 MG/1
10 TABLET ORAL EVERY MORNING
Status: DISCONTINUED | OUTPATIENT
Start: 2024-07-20 | End: 2024-07-24 | Stop reason: HOSPADM

## 2024-07-19 RX ORDER — LEVOTHYROXINE SODIUM 100 UG/1
100 TABLET ORAL DAILY
Status: DISCONTINUED | OUTPATIENT
Start: 2024-07-20 | End: 2024-07-20

## 2024-07-19 RX ADMIN — DIPHENHYDRAMINE HYDROCHLORIDE 50 MG: 50 INJECTION INTRAMUSCULAR; INTRAVENOUS at 17:37

## 2024-07-19 RX ADMIN — IOPAMIDOL 80 ML: 755 INJECTION, SOLUTION INTRAVENOUS at 19:03

## 2024-07-19 RX ADMIN — LORAZEPAM 0.5 MG: 2 INJECTION INTRAMUSCULAR; INTRAVENOUS at 14:55

## 2024-07-19 RX ADMIN — FUROSEMIDE 20 MG: 10 INJECTION, SOLUTION INTRAMUSCULAR; INTRAVENOUS at 20:58

## 2024-07-19 NOTE — ED PROVIDER NOTES
Emergency Medicine Note  HPI   HISTORY OF PRESENT ILLNESS     The patient was sent from an outpatient Echo in [place] because she complained of chest pain and shortness of breath.  She is also supposed to have a biopsy today as she is suspected of having lung cancer.  She mostly complains of a feeling that her heart is racing.  She also has a heavy feeling on her chest and pain with inspiration.  She has chills but no fever.  No URI symptoms.  She seems very anxious but denies feeling that way.          Patient History   PAST HISTORY     Reviewed from Nursing Triage:       Past Medical History:   Diagnosis Date    De Quervain's tenosynovitis     Essential (primary) hypertension     Follicular carcinoma of thyroid (CMS/HCC)     Gastro-esophageal reflux     Hand ulceration (CMS/HCC)     Hyperlipidemia, unspecified     Interstitial lung disease (CMS/HCC)     Leg ulcer (CMS/HCC)     Lung nodule     Lupus (CMS/HCC)     Osteomyelitis of hand (CMS/HCC)     Osteoporosis     Pulmonary hypertension (CMS/HCC)     Raynaud's disease     Severe    Reflux esophagitis     Scleroderma (CMS/HCC)     Screening mammogram for breast cancer 2021    BI-RADS category: 1 - Negative (care everywhere @ [facility])       Past Surgical History:   Procedure Laterality Date    CARDIAC CATHETERIZATION      COLONOSCOPY      HERNIA REPAIR         Family History   Problem Relation Age of Onset    Heart disease Biological Brother         stent    Breast cancer Niece     Cervical cancer Neg Hx     Uterine cancer Neg Hx     Colon cancer Neg Hx     Ovarian cancer Neg Hx        Social History     Tobacco Use    Smoking status: Former     Types: Cigarettes     Quit date: 9/15/2005     Years since quittin.8    Smokeless tobacco: Never    Tobacco comments:     Would smoke 1/2 of 1/2 PPD, quit in    Vaping Use    Vaping Use: Never used   Substance Use Topics    Alcohol use: Never    Drug use: Never         Review of Systems   REVIEW OF SYSTEMS      Review of Systems      VITALS     ED Vitals      Date/Time Temp Pulse Resp BP SpO2 [facility]   07/19/24 2002 -- 105 20 169/76 96 % AS   07/19/24 1926 -- 111 18 156/72 95 % AS   07/19/24 1800 -- 129 24 152/75 92 % AML   07/19/24 1700 -- 102 29 160/74 99 % AML   07/19/24 1649 -- 110 -- -- -- AML   07/19/24 1500 -- 114 27 168/75 100 % AML   07/19/24 1406 36.6 °C (97.9 °F) -- -- -- -- [place]   07/19/24 1402 -- 138 26 174/79 94 % TNC                         Physical Exam   PHYSICAL EXAM     Physical Exam  Vitals and nursing note reviewed.   Constitutional:       Appearance: She is well-developed.   HENT:      Head: Normocephalic and atraumatic.   Eyes:      Conjunctiva/sclera: Conjunctivae normal.   Neck:      Vascular: No JVD.   Cardiovascular:      Rate and Rhythm: Regular rhythm. Tachycardia present.   Pulmonary:      Effort: Pulmonary effort is normal. No respiratory distress.      Breath sounds: Normal breath sounds.   Abdominal:      General: Bowel sounds are normal.      Palpations: Abdomen is soft.      Tenderness: There is no abdominal tenderness.   Musculoskeletal:         General: No deformity.      Cervical back: Neck supple.      Right lower leg: No edema.      Left lower leg: No edema.   Skin:     General: Skin is warm and dry.   Neurological:      General: No focal deficit present.      Mental Status: She is alert.      Cranial Nerves: No cranial nerve deficit.   Psychiatric:      Comments: Anxious and tearful           PROCEDURES     Critical Care    Performed by: Naun Prasad (Ed) MD Ramirez  Authorized by: Naun Prasad (Ed) MD Ramirez    Critical care provider statement:     Critical care time (minutes):  35    Critical care time was exclusive of:  Separately billable procedures and treating other patients and teaching time    I assumed direction of critical care for this patient from another provider in my specialty: no      Care discussed with: admitting provider         DATA     Results        Procedure Component Value Units Date/Time    TSH w reflex FT4 [[id number]] Collected: 07/19/24 1432    Specimen: Blood, Venous Updated: 07/19/24 1710    HS Troponin I (with 2 hour reflex) [[id number]]  (Abnormal) Collected: 07/19/24 1432    Specimen: Blood, Venous Updated: 07/19/24 1559     High Sens Troponin I 90.3 pg/mL      Comment: Result rechecked  Filtered to eliminate fibrin         B-type natriuretic peptide [[id number]]  (Abnormal) Collected: 07/19/24 1432    Specimen: Blood, Venous Updated: 07/19/24 1515      pg/mL     Comprehensive metabolic panel [[id number]]  (Abnormal) Collected: 07/19/24 1432    Specimen: Blood, Venous Updated: 07/19/24 1512     Sodium 143 mEQ/L      Potassium 4.0 mEQ/L      Comment: Results obtained on plasma. Plasma Potassium values may be up to 0.4 mEQ/L less than serum values. The differences may be greater for patients with high platelet or white cell counts.        Chloride 110 mEQ/L      CO2 23 mEQ/L      BUN 15 mg/dL      Creatinine 1.0 mg/dL      Glucose 93 mg/dL      Calcium 9.7 mg/dL      AST (SGOT) 14 IU/L      ALT (SGPT) 7 IU/L      Alkaline Phosphatase 69 IU/L      Total Protein 8.2 g/dL      Comment: Test performed on plasma which typically contains approximately 0.4 g/dL more protein than serum.        Albumin 4.1 g/dL      Bilirubin, Total 0.4 mg/dL      eGFR >60.0 mL/min/1.73m*2      Comment: Calculation based on the Chronic Kidney Disease Epidemiology Collaboration (CKD-EPI) equation refit without adjustment for race.        Anion Gap 10 mEQ/L     CBC and differential [[id number]]  (Abnormal) Collected: 07/19/24 1432    Specimen: Blood, Venous Updated: 07/19/24 1446     WBC 9.78 K/uL      RBC 4.46 M/uL      Hemoglobin 10.9 g/dL      Hematocrit 35.9 %      MCV 80.5 fL      MCH 24.4 pg      MCHC 30.4 g/dL      RDW 18.3 %      Platelets 212 K/uL      MPV 11.3 fL      Differential Type Auto     nRBC 0.0 %      Immature Granulocytes 0.3 %      Neutrophils 76.3 %       Lymphocytes 15.8 %      Monocytes 6.3 %      Eosinophils 0.8 %      Basophils 0.5 %      Immature Granulocytes, Absolute 0.03 K/uL      Neutrophils, Absolute 7.45 K/uL      Lymphocytes, Absolute 1.55 K/uL      Monocytes, Absolute 0.62 K/uL      Eosinophils, Absolute 0.08 K/uL      Basophils, Absolute 0.05 K/uL             Imaging Results              CT ANGIOGRAPHY CHEST PULMONARY EMBOLISM WITH IV CONTRAST (Final result)  Result time 07/19/24 19:51:33      Final result                   Impression:    IMPRESSION:  1.  Negative evaluation for pulmonary embolism.  2.  New small left pleural effusion. Increased irregular left basilar airspace  disease and ongoing 16mm nodule, which was hypermetabolic on recent PET/CT.  Malignancy with pleural metastatic disease is not excluded. Consider diagnostic  thoracentesis.  3.  New/increased airspace disease also in the dependent and basilar right lung  suggesting pneumonia or pneumonitis.  4.  Suspected mild pulmonary edema.  5.  Moderate pericardial effusion, mildly increased.  6.  New small partially loculated left pleural effusion.  7.  Mildly enlarged mediastinal and hilar lymph nodes, which may be inflammatory  or metastatic in nature.    In accordance with [place] Act 112,  the patient will receive a letter notifying them  to follow up with their physician.               Narrative:    CLINICAL HISTORY: Chest pain and shortness of breath for one week. Concern for  pulmonary embolism. Chest pain, nonspecific.    COMMENT: CT angiography of the chest, pulmonary embolism protocol, was  performed. Sagittal and coronal reconstructions were generated and reviewed.    ADMINISTERED CONTRAST:  80mL of iopamidoL (ISOVUE-370) 370 mg iodine /mL (76 %) injection 100 mL  intravenously    CT DOSE:  One or more dose reduction techniques (e.g. automated exposure  control, adjustment of the mA and/or kV according to patient size, use of  iterative reconstruction technique) utilized for this  examination.    3D MIP and/or volume rendered image reconstruction performed and reviewed.    COMPARISON: Comparison is made with PET/CT dated 6/14/2024 and chest CT from  5/21/2024.    There is satisfactory opacification of the pulmonary arteries. No pulmonary  embolism is demonstrated. The lung bases are not optimally evaluated due to  motion artifact.    There is a new small partially loculated left pleural effusion. There is nodular  left pleural thickening and irregular airspace disease at the left lung base.  There is also airspace disease in the dependent and basilar right lower lobe.  Groundglass opacity and mild interstitial prominence is noted elsewhere in both  lungs. A masslike opacity is again seen in the medial basilar left lower lobe  measuring grossly 16 mm (series 4 image 70). There is a 6 mm nodule in the  lateral apical left upper lobe (series A5 image 63), mildly increased from 4 to  5 mm on 5/21/2024.    There is a moderate cardiomegaly and a moderate pericardial effusion. There are  probably moderate coronary artery calcifications. There are mildly enlarged  mediastinal and hilar lymph nodes, for example 13 x 8 mm left prevascular and 15  x 13 mm right hilar.    No suspicious lytic or blastic skeletal lesion is seen.                                       X-RAY CHEST 1 VIEW (Final result)  Result time 07/19/24 15:20:43      Preliminary Interpretation    No change compared to prior                      Final result                   Impression:    IMPRESSION:  Unchanged marked cardiomegaly (with known pericardial effusion on prior CT) and  vascular congestion with diffuse interstitial opacity likely reflecting mild  edema.    Coarse basilar interstitial opacities related to known fibrotic lung disease is  less well appreciated radiographically.    Left basilar pleural-parenchymal opacity probably related to a concomitant small  pleural effusion and associated retrocardiac atelectasis or  consolidation.                             Narrative:    CLINICAL HISTORY: Chest Pain/Arrhythmia    COMPARISON: June 22, 2024    COMMENT:    TECHNIQUE: Single frontal view of the chest was performed.    LINES/TUBES/HARDWARE: Monitoring leads/wires overlie the patient.    LUNGS AND PLEURA: Vascular congestion with diffuse interstitial airspace  opacity.    Basilar pleural-parenchymal opacity with obscuration of the left hemidiaphragm,  likely related to a small effusion with underlying atelectasis.    CARDIOMEDIASTINUM: Unchanged marked cardiomegaly. Aortic atherosclerosis.    SKELETON/OTHER: No acute osseous abnormality.                                      ECG 12 lead          Scoring tools                                  ED Course & MDM   MDM / ED COURSE / CLINICAL IMPRESSION / DISPO     Medical Decision Making  Problems Addressed:  Chest pain, unspecified type: acute illness or injury that poses a threat to life or bodily functions  Shortness of breath: acute illness or injury that poses a threat to life or bodily functions    Amount and/or Complexity of Data Reviewed  Labs: ordered.  Radiology: ordered and independent interpretation performed.  ECG/medicine tests: ordered and independent interpretation performed.     Details: Sinus tachycardia with a rate of 136    Risk  Prescription drug management.  Decision regarding hospitalization.        ED Course as of 07/19/24 2057 Fri Jul 19, 2024   1427 I paged cardiology as the Echo that she was sent from is not resulted. [HS]   1433 I sent a message to Dr. Arvizu and spoke with the fellow who will try to track down the result or get the Echo read. [HS]   1443 I spoke with Dr. Arvizu.  He thinks her CP is likely noncardiac.  She had a cardiac CTA earlier that showed nonobstructive disease. He looked at the Echo and there is a small pericardial effusion without any evidence of tamponade.  The wall motion is normal.  HR is fast.  She has a normal EF.  He will enter an  official read in a couple of hours. [HS]   2019 I paged the hospitalist for admission. [HS]   2033 CT does not show evidence of PE, but she has a new left pleural effusion and possible pulmonary edema.  She also has new airspace disease concerning for pneumonia.  I asked her about her penicillin allergy and she states that it caused her to stop breathing.  Therefore, I ordered Levaquin rather than Rocephin. [HS]   2050 I spoke with the hospitalist and we are in agreement to withhold antibiotics for now.  Her clinical picture is not consistent with pneumonia.  We are in agreement to start Lasix 20 mg IV. [HS]      ED Course User Index  [HS] Naun Prasad (Ed) MD Ramirez     Clinical Impression      Chest pain, unspecified type   Shortness of breath   Pericardial effusion   Pleural effusion     _________________       ED Disposition   Admit / Observation                       Naun Prasad (Ed) MD Ramirez  07/19/24 2057

## 2024-07-19 NOTE — TELEPHONE ENCOUNTER
Pt was in [place] office for an Echo. Patient advised the the technician she was not feeling well and had chest pain and sob. Pt appeared very upset  Pts heart rate 135.    Dr Arvizu was contacted and advised us that she needs go to the the ER.    Pt was advised and is having  take her to the ER at [facility].

## 2024-07-20 LAB
ANION GAP SERPL CALC-SCNC: 10 MEQ/L (ref 3–15)
AORTIC ROOT ANNULUS: 3.18 CM
AORTIC VALVE MEAN VELOCITY: 1.56 M/S
AORTIC VALVE VELOCITY TIME INTEGRAL: 37.4 CM
ASCENDING AORTA: 3.36 CM
AV MEAN GRADIENT: 11 MMHG
AV MEAN GRADIENT: 11 MMHG
AV PEAK GRADIENT: 21 MMHG
AV PEAK VELOCITY-S: 2.28 M/S
AV VALVE AREA INDEX: 0.91
AV VALVE AREA: 1.3 CM2
AV VELOCITY RATIO: 0.45
AVA (VTI): 1.4 CM2
BSA FOR ECHO PROCEDURE: 1.54 M2
BUN SERPL-MCNC: 17 MG/DL (ref 7–25)
CALCIUM SERPL-MCNC: 9.3 MG/DL (ref 8.6–10.3)
CHLORIDE SERPL-SCNC: 107 MEQ/L (ref 98–107)
CO2 SERPL-SCNC: 25 MEQ/L (ref 21–31)
CREAT SERPL-MCNC: 1.2 MG/DL (ref 0.6–1.2)
DOP CALC LVOT STROKE VOLUME: 52.44 CM3
E WAVE DECELERATION TIME: 140 MS
E/A RATIO: 0.7
E/E' RATIO: 41.7
E/LAT E' RATIO: 35.7
EDV (BP): 61.2 CM3
EF (A4C): 71.5 %
EF A2C: 65.5 %
EGFRCR SERPLBLD CKD-EPI 2021: 51 ML/MIN/1.73M*2
EJECTION FRACTION: 67.8 %
ERYTHROCYTE [DISTWIDTH] IN BLOOD BY AUTOMATED COUNT: 18.1 % (ref 11.7–14.4)
EST RIGHT VENT SYSTOLIC PRESSURE BY TRICUSPID REGURGITATION JET: 50 MMHG
ESV (BP): 19.7 CM3
FRACTIONAL SHORTENING: 36.53 %
GLUCOSE SERPL-MCNC: 85 MG/DL (ref 70–99)
HCT VFR BLD AUTO: 32 % (ref 35–45)
HGB BLD-MCNC: 9.8 G/DL (ref 11.8–15.7)
INTERVENTRICULAR SEPTUM: 1.52 CM
LA ESV (BP): 64.5 CM3
LA ESV INDEX (A2C): 33.57 CM3/M2
LA ESV INDEX (BP): 41.88 CM3/M2
LA/AORTA RATIO: 1.21
LAAS-AP2: 18.4 CM2
LAAS-AP4: 23.2 CM2
LAD 2D: 3.86 CM
LALD A4C: 5.43 CM
LALD A4C: 6.14 CM
LAV-S: 51.7 CM3
LEFT ATRIUM VOLUME INDEX: 46.69 CM3/M2
LEFT ATRIUM VOLUME: 71.9 CM3
LEFT INTERNAL DIMENSION IN SYSTOLE: 2.38 CM (ref 2.28–3.44)
LEFT VENTRICLE DIASTOLIC VOLUME INDEX: 36.62 CM3/M2
LEFT VENTRICLE DIASTOLIC VOLUME: 56.4 CM3
LEFT VENTRICLE SYSTOLIC VOLUME INDEX: 10.45 CM3/M2
LEFT VENTRICLE SYSTOLIC VOLUME: 16.1 CM3
LEFT VENTRICULAR INTERNAL DIMENSION IN DIASTOLE: 3.75 CM (ref 3.83–5.32)
LEFT VENTRICULAR POSTERIOR WALL IN END DIASTOLE: 1.39 CM (ref 0.49–0.92)
LV DIASTOLIC VOLUME: 67 CM3
LV ESV (APICAL 2 CHAMBER): 23.1 CM3
LVAD-AP2: 23.3 CM2
LVAD-AP4: 22.6 CM2
LVAS-AP2: 12.8 CM2
LVAS-AP4: 10.5 CM2
LVEDVI(A2C): 43.51 CM3/M2
LVEDVI(BP): 39.74 CM3/M2
LVESVI(A2C): 15 CM3/M2
LVESVI(BP): 12.79 CM3/M2
LVLD-AP2: 7.16 CM
LVLD-AP4: 7.28 CM
LVLS-AP2: 6.28 CM
LVLS-AP4: 5.97 CM
LVOT 2D: 2 CM
LVOT A: 3.14 CM2
LVOT MG: 2 MMHG
LVOT MV: 0.6 M/S
LVOT PEAK VELOCITY: 1.2 M/S
LVOT PG: 6 MMHG
LVOT STROKE VOLUME INDEX: 34.05 ML/M2
LVOT VTI: 16.7 CM
MCH RBC QN AUTO: 24.6 PG (ref 28–33.2)
MCHC RBC AUTO-ENTMCNC: 30.6 G/DL (ref 32.2–35.5)
MCV RBC AUTO: 80.4 FL (ref 83–98)
MLH CV ECHO AVA INDEX VELOCITY RATIO: 0.8
MV E'TISSUE VEL-LAT: 0.03 M/S
MV E'TISSUE VEL-MED: 0.02 M/S
MV PEAK A VEL: 1.39 M/S
MV PEAK E VEL: 1 M/S
MV STENOSIS PRESSURE HALF TIME: 41 MS
MV VALVE AREA P 1/2 METHOD: 5.37 CM2
PDW BLD AUTO: 11.7 FL (ref 9.4–12.3)
PLATELET # BLD AUTO: 206 K/UL (ref 150–369)
POSTERIOR WALL: 1.39 CM
POTASSIUM SERPL-SCNC: 4.2 MEQ/L (ref 3.5–5.1)
PV PEAK GRADIENT: 7 MMHG
PV PV: 1.35 M/S
RAP: 3 MMHG
RBC # BLD AUTO: 3.98 M/UL (ref 3.93–5.22)
RVOT VMAX: 0.93 M/S
SEPTAL TISSUE DOPPLER FREE WALL LATE DIA VELOCITY (APICAL 4 CHAMBER VIEW): 0.08 M/S
SODIUM SERPL-SCNC: 142 MEQ/L (ref 136–145)
TR MAX PG: 46.79 MMHG
TRICUSPID VALVE PEAK REGURGITATION VELOCITY: 3.42 M/S
WBC # BLD AUTO: 7.27 K/UL (ref 3.8–10.5)
Z-SCORE OF LEFT VENTRICULAR DIMENSION IN END DIASTOLE: -1.85
Z-SCORE OF LEFT VENTRICULAR DIMENSION IN END SYSTOLE: -1.28
Z-SCORE OF LEFT VENTRICULAR POSTERIOR WALL IN END DIASTOLE: 3.79

## 2024-07-20 PROCEDURE — 63700000 HC SELF-ADMINISTRABLE DRUG: Performed by: STUDENT IN AN ORGANIZED HEALTH CARE EDUCATION/TRAINING PROGRAM

## 2024-07-20 PROCEDURE — 99233 SBSQ HOSP IP/OBS HIGH 50: CPT | Performed by: INTERNAL MEDICINE

## 2024-07-20 PROCEDURE — 85027 COMPLETE CBC AUTOMATED: CPT | Performed by: STUDENT IN AN ORGANIZED HEALTH CARE EDUCATION/TRAINING PROGRAM

## 2024-07-20 PROCEDURE — 21400000 HC ROOM AND CARE CCU/INTERMEDIATE

## 2024-07-20 PROCEDURE — 63700000 HC SELF-ADMINISTRABLE DRUG: Performed by: INTERNAL MEDICINE

## 2024-07-20 PROCEDURE — G0378 HOSPITAL OBSERVATION PER HR: HCPCS

## 2024-07-20 PROCEDURE — 36415 COLL VENOUS BLD VENIPUNCTURE: CPT | Performed by: STUDENT IN AN ORGANIZED HEALTH CARE EDUCATION/TRAINING PROGRAM

## 2024-07-20 PROCEDURE — 80048 BASIC METABOLIC PNL TOTAL CA: CPT | Performed by: STUDENT IN AN ORGANIZED HEALTH CARE EDUCATION/TRAINING PROGRAM

## 2024-07-20 RX ORDER — LEVOTHYROXINE SODIUM 100 UG/1
100 TABLET ORAL
Status: DISCONTINUED | OUTPATIENT
Start: 2024-07-21 | End: 2024-07-24 | Stop reason: HOSPADM

## 2024-07-20 RX ORDER — ACETAMINOPHEN 325 MG/1
325 TABLET ORAL EVERY 4 HOURS PRN
Status: DISCONTINUED | OUTPATIENT
Start: 2024-07-20 | End: 2024-07-20

## 2024-07-20 RX ORDER — LIDOCAINE AND PRILOCAINE 25; 25 MG/G; MG/G
CREAM TOPICAL ONCE
Status: COMPLETED | OUTPATIENT
Start: 2024-07-20 | End: 2024-07-20

## 2024-07-20 RX ORDER — ACETAMINOPHEN 325 MG/1
975 TABLET ORAL EVERY 8 HOURS PRN
Status: DISCONTINUED | OUTPATIENT
Start: 2024-07-20 | End: 2024-07-23

## 2024-07-20 RX ADMIN — SILDENAFIL 20 MG: 20 TABLET, FILM COATED ORAL at 19:56

## 2024-07-20 RX ADMIN — SALINE NASAL SPRAY 2 SPRAY: 1.5 SOLUTION NASAL at 12:18

## 2024-07-20 RX ADMIN — SILDENAFIL 20 MG: 20 TABLET, FILM COATED ORAL at 00:18

## 2024-07-20 RX ADMIN — LIDOCAINE AND PRILOCAINE: 25; 25 CREAM TOPICAL at 16:32

## 2024-07-20 RX ADMIN — Medication 1000 UNITS: at 08:38

## 2024-07-20 RX ADMIN — MUPIROCIN 1 APPLICATION: 20 OINTMENT TOPICAL at 08:39

## 2024-07-20 RX ADMIN — SILDENAFIL 20 MG: 20 TABLET, FILM COATED ORAL at 08:37

## 2024-07-20 RX ADMIN — PANTOPRAZOLE SODIUM 20 MG: 20 TABLET, DELAYED RELEASE ORAL at 08:37

## 2024-07-20 RX ADMIN — ACETAMINOPHEN 325 MG: 325 TABLET ORAL at 11:46

## 2024-07-20 RX ADMIN — LEVOTHYROXINE SODIUM 100 MCG: 0.1 TABLET ORAL at 08:37

## 2024-07-20 RX ADMIN — AMLODIPINE BESYLATE 10 MG: 10 TABLET ORAL at 08:37

## 2024-07-20 RX ADMIN — LIDOCAINE 4% 1 PATCH: 40 PATCH TOPICAL at 14:26

## 2024-07-20 RX ADMIN — SILDENAFIL 20 MG: 20 TABLET, FILM COATED ORAL at 14:26

## 2024-07-20 RX ADMIN — ACETAMINOPHEN 325 MG: 325 TABLET ORAL at 01:50

## 2024-07-20 RX ADMIN — ATORVASTATIN CALCIUM 40 MG: 40 TABLET, FILM COATED ORAL at 18:07

## 2024-07-20 RX ADMIN — ACETAMINOPHEN 975 MG: 325 TABLET ORAL at 16:34

## 2024-07-20 ASSESSMENT — COGNITIVE AND FUNCTIONAL STATUS - GENERAL
CLIMB 3 TO 5 STEPS WITH RAILING: 3 - A LITTLE
WALKING IN HOSPITAL ROOM: 3 - A LITTLE
WALKING IN HOSPITAL ROOM: 3 - A LITTLE
STANDING UP FROM CHAIR USING ARMS: 3 - A LITTLE
CLIMB 3 TO 5 STEPS WITH RAILING: 3 - A LITTLE
STANDING UP FROM CHAIR USING ARMS: 3 - A LITTLE
CLIMB 3 TO 5 STEPS WITH RAILING: 2 - A LOT
WALKING IN HOSPITAL ROOM: 3 - A LITTLE
MOVING TO AND FROM BED TO CHAIR: 3 - A LITTLE
STANDING UP FROM CHAIR USING ARMS: 3 - A LITTLE
MOVING TO AND FROM BED TO CHAIR: 3 - A LITTLE
MOVING TO AND FROM BED TO CHAIR: 3 - A LITTLE
CLIMB 3 TO 5 STEPS WITH RAILING: 3 - A LITTLE
STANDING UP FROM CHAIR USING ARMS: 3 - A LITTLE
WALKING IN HOSPITAL ROOM: 2 - A LOT

## 2024-07-20 NOTE — PLAN OF CARE
Plan of Care Review  Plan of Care Reviewed With: patient  Progress: no change  Outcome Evaluation: AOX4. Standby assist to bathroom. Pt complain of pain prn tylenol given with no relief. Order for tylenol dosage up and pt found some relief and lidocaine patch in place on left side of abdomen. 2 lits of O2. Pt able to make needs known.

## 2024-07-20 NOTE — ASSESSMENT & PLAN NOTE
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 Currently on amlodipine 10mg in the morning and 10mg in the evening if she needs it. Also on sildenafil  - Continue home regimen as prescribed

## 2024-07-20 NOTE — CONSULTS
Pulmonary  Consult Note     CONSULT DETAILS     Indication for Consultation:  ILD, pHTN, unclear if she has need for toci and/or steroids     Requesting Physician:  Dr. Figueroa    Consulting Physician:  Dr. Woods    Primary Care Physician:  Dr. Simmons    Pulmonologist:  Dr. Wasserman       HISTORY OF PRESENT ILLNESS     Arielle Felix is a 63 y.o. female with Cutaneous Systemic Scleroderma (dx 1998) c/b ILD and Group 1 Pulmonary HTN, HFpEF, HTN, Raynaud's Disease, protein calorie malnutrition, hx OF PE  presenting from outpt TTE for palpitations.    Of note, patient is known to our service, she has a history of group 1 PH occurring in the background of Systemic Scleroderma with ILD and a history of restrictive lung disease and ILD and used to follow at [facility]. She failed MMF and myfortic secondary to GI distress. She is adamant not to try that again. She had a LLL pulmonary nodule that had an SUV of 4.2 that was thought to be inflammatory back in july of 2023. She was hospitalized in march 2024  for acute dyspnea and was found to have pericarditis and subsequent pericardial effusion. She was started on colcichin and prednione taper. She then was hospitalized again in April with chest pain, which was thought to be secondary to her pericarditis. During that admission, she was evaluated rheumatology and received her first dose of IV tocilizumab and started on a prednisone taper. During this hospitalization, she was discharged on 2 L nasal cannula. She was started on tocilizumab every 28 days by rheumatology. She then admitted again on May 22nd after being referred by rheumatology office for hypertension and tachycardia. CT during the hospital stay showed volume overload vs. ILD flare, so she was diuresised, given steroids 1mg/kd and tocilizumab. She was discharged on prolonged steroid taper that she finished about 1.5 weeks ago.    She complains of left sided pleuritic chest pain for the past multiple months with  no improvement with steroids.     In the ER, she was afebrile tachycardic to 110s-130s, and hypertensive that improved. She was placed on 2LNC. Her labs were notable for trop 90--> 89, .       PAST MEDICAL/SURGICAL HISTORY     Medical History:   Past Medical History:   Diagnosis Date    De Quervain's tenosynovitis     Essential (primary) hypertension     Follicular carcinoma of thyroid (CMS/HCC)     Gastro-esophageal reflux     Hand ulceration (CMS/HCC)     Hyperlipidemia, unspecified     Interstitial lung disease (CMS/HCC)     Leg ulcer (CMS/HCC)     Lung nodule     Lupus (CMS/HCC)     Osteomyelitis of hand (CMS/HCC)     Osteoporosis     Pulmonary hypertension (CMS/HCC)     Raynaud's disease     Severe    Reflux esophagitis     Scleroderma (CMS/HCC)     Screening mammogram for breast cancer 05/04/2021    BI-RADS category: 1 - Negative (care everywhere @ [place])       Surgical History:   Past Surgical History:   Procedure Laterality Date    CARDIAC CATHETERIZATION      COLONOSCOPY      HERNIA REPAIR            MEDICATIONS     Home Medications:    amLODIPine, Take 10 mg by mouth every morning.    atorvastatin, Take 1 tablet (40 mg total) by mouth daily.    benzonatate, Take 100 mg by mouth 3 (three) times a day as needed for cough.    biotin, Take 1,000 mcg by mouth daily.    cholecalciferol (vitamin D3), Take 1,000 Units by mouth daily.    collagenase, Apply 1 Application topically daily.    cyanocobalamin (vitamin B-12), Take 1,000 mcg by mouth daily.    docusate sodium, Take 100 mg by mouth daily as needed for constipation.    levothyroxine, Take 100 mcg by mouth daily. BRAND ONLY    lidocaine, Apply 1 patch topically daily as needed (pain). Remove & discard patch within 12 hours or as directed by prescriber.    loperamide, Take 2 mg by mouth every 8 (eight) hours as needed for diarrhea.    macitentan, Take 1 tablet (10 mg total) by mouth daily.    meclizine, Take 25 mg by mouth daily as needed.    MEDIHONEY,  HONEY, TOP, Apply topically daily.    mupirocin, Apply 1 application. topically daily. Behind ears    pantoprazole, Take 20 mg by mouth daily.    sildenafiL (pulm.hypertension), Take 1 tablet (20 mg total) by mouth 3 (three) times a day.    sodium chloride, Administer 2 sprays into each nostril daily.    ACTEMRA, Infuse 8 mg/kg into a venous catheter every 28 (twentyeight) days.    Current Medications:   amLODIPine  10 mg oral q AM    atorvastatin  40 mg oral Daily (6p)    cholecalciferol (vitamin D3)  1,000 Units oral Daily    [START ON 2024] levothyroxine  100 mcg oral Daily (6:30a)    lidocaine-prilocaine   Topical Once    macitentan  10 mg oral Daily    mupirocin  1 Application Topical Daily    pantoprazole  20 mg oral Daily    sildenafiL (pulm.hypertension)  20 mg oral TID    sodium chloride  2 spray Each Nostril Daily          ALLERGIES     Aspirin, Ibuprofen, Iodine, Iodine and iodide containing products, Penicillins, Sulfa (sulfonamide antibiotics), Clindamycin, Hydrochlorothiazide, Oxycodone, Sulfamethoxazole-trimethoprim, and Latex       FAMILY HISTORY     Family History   Problem Relation Age of Onset    Heart disease Biological Brother         stent    Breast cancer Niece     Cervical cancer Neg Hx     Uterine cancer Neg Hx     Colon cancer Neg Hx     Ovarian cancer Neg Hx           SOCIAL HISTORY     Social History     Socioeconomic History    Marital status:    Tobacco Use    Smoking status: Former     Types: Cigarettes     Quit date: 9/15/2005     Years since quittin.8    Smokeless tobacco: Never    Tobacco comments:     Would smoke 1/2 of 1/2 PPD, quit in    Vaping Use    Vaping Use: Never used   Substance and Sexual Activity    Alcohol use: Never    Drug use: Never    Sexual activity: Not Currently     Birth control/protection: Post-menopausal     Social Determinants of Health     Food Insecurity: No Food Insecurity (2024)    Hunger Vital Sign     Worried About Running Out  "of Food in the Last Year: Never true     Ran Out of Food in the Last Year: Never true   Transportation Needs: No Transportation Needs (5/22/2024)    PRAPARE - Transportation     Lack of Transportation (Medical): No     Lack of Transportation (Non-Medical): No   Housing Stability: Low Risk  (5/22/2024)    Housing Stability Vital Sign     Unable to Pay for Housing in the Last Year: No     Number of Places Lived in the Last Year: 1     Unstable Housing in the Last Year: No          REVIEW OF SYSTEMS     A full review of systems was obtained and is negative then otherwise stated in the HPI.       PHYSICAL EXAM     Vital Signs:   Visit Vitals  BP (!) 119/57 (BP Location: Right upper arm, Patient Position: Lying)   Pulse 94   Temp 36.8 °C (98.3 °F) (Oral)   Resp 18   Ht 1.676 m (5' 6\")   Wt 49.5 kg (109 lb 2 oz)   LMP  (LMP Unknown)   SpO2 97%   BMI 17.61 kg/m²     Vitals:    07/20/24 1200 07/20/24 1222 07/20/24 1506 07/20/24 1511   BP:  (!) 125/59  (!) 119/57   BP Location:  Right upper arm  Right upper arm   Patient Position:  Sitting  Lying   Pulse:  90 88 94   Resp:  18  18   Temp:  36.4 °C (97.6 °F)  36.8 °C (98.3 °F)   TempSrc:  Oral  Oral   SpO2: 97% 97%  97%   Weight:       Height:           Physical Exam:  General: NAD, resting comfortably in bed, chronically ill appearing  Cardiac: tachycardic, no murmurs, rubs and gallops  Respiratory: crackles at left lung bases, normal effort  Abdominal: soft, NT  Neuro: AAOx3      I/O's:    Intake/Output Summary (Last 24 hours) at 7/20/2024 1609  Last data filed at 7/20/2024 0800  Gross per 24 hour   Intake 725 ml   Output 975 ml   Net -250 ml          DIAGNOSTIC DATA     Labs:  I have personally reviewed all pertinent patient laboratory results. Labs of note discussed below:    CBC Results         07/20/24 07/19/24 06/22/24     0414 1432 1346    WBC 7.27 9.78 9.83    RBC 3.98 4.46 3.95    HGB 9.8 10.9 9.9    HCT 32.0 35.9 34.0    MCV 80.4 80.5 86.1    MCH 24.6 24.4 25.1    " MCHC 30.6 30.4 29.1     212 359          BMP Results         07/20/24 07/19/24 06/22/24     0415 1432 1346     143 141    K 4.2 4.0 3.8    Cl 107 110 109    CO2 25 23 20    Glucose 85 93 140    BUN 17 15 14    Creatinine 1.2 1.0 1.0    Calcium 9.3 9.7 9.5    Anion Gap 10 10 12    EGFR 51.0 >60.0 >60.0           Comment for K at 1432 on 07/19/24: Results obtained on plasma. Plasma Potassium values may be up to 0.4 mEQ/L less than serum values. The differences may be greater for patients with high platelet or white cell counts.    Comment for K at 1346 on 06/22/24: Results obtained on plasma. Plasma Potassium values may be up to 0.4 mEQ/L less than serum values. The differences may be greater for patients with high platelet or white cell counts.    Comment for EGFR at 0415 on 07/20/24: Calculation based on the Chronic Kidney Disease Epidemiology Collaboration (CKD-EPI) equation refit without adjustment for race.    Comment for EGFR at 1432 on 07/19/24: Calculation based on the Chronic Kidney Disease Epidemiology Collaboration (CKD-EPI) equation refit without adjustment for race.    Comment for EGFR at 1346 on 06/22/24: Calculation based on the Chronic Kidney Disease Epidemiology Collaboration (CKD-EPI) equation refit without adjustment for race.          Microbiology Results       ** No results found for the last 720 hours. **          ABG Results         04/13/23     1949    Source Of Oxygen nc            Imaging:  I have reviewed all pertinent imaging which is discussed below:    Transthoracic echo (TTE) limited    Result Date: 7/20/2024  Rhythm is sinus tachycardia. Mild increased wall thickness with normal left ventricular cavity and preserved systolic function. EF 65%. No wall motion abnormalities. Normal right ventricular size with preserved systolic function. IVC normal in size with >50% respiratory variation consistent with normal right sided filling pressures. Small pericardial effusion. No  evidence of hemodynamic compromise with normal respiratory variation and no chamber collapse. Prominent epicardial fat. Compared with previous study of 5/23/2024, small pericardial effusion persists.      CT ANGIOGRAPHY CHEST PULMONARY EMBOLISM WITH IV CONTRAST    Result Date: 7/19/2024  CLINICAL HISTORY: Chest pain and shortness of breath for one week. Concern for pulmonary embolism. Chest pain, nonspecific. COMMENT: CT angiography of the chest, pulmonary embolism protocol, was performed. Sagittal and coronal reconstructions were generated and reviewed. ADMINISTERED CONTRAST: 80mL of iopamidoL (ISOVUE-370) 370 mg iodine /mL (76 %) injection 100 mL intravenously CT DOSE:  One or more dose reduction techniques (e.g. automated exposure control, adjustment of the mA and/or kV according to patient size, use of iterative reconstruction technique) utilized for this examination. 3D MIP and/or volume rendered image reconstruction performed and reviewed. COMPARISON: Comparison is made with PET/CT dated 6/14/2024 and chest CT from 5/21/2024. There is satisfactory opacification of the pulmonary arteries. No pulmonary embolism is demonstrated. The lung bases are not optimally evaluated due to motion artifact. There is a new small partially loculated left pleural effusion. There is nodular left pleural thickening and irregular airspace disease at the left lung base. There is also airspace disease in the dependent and basilar right lower lobe. Groundglass opacity and mild interstitial prominence is noted elsewhere in both lungs. A masslike opacity is again seen in the medial basilar left lower lobe measuring grossly 16 mm (series 4 image 70). There is a 6 mm nodule in the lateral apical left upper lobe (series A5 image 63), mildly increased from 4 to 5 mm on 5/21/2024. There is a moderate cardiomegaly and a moderate pericardial effusion. There are probably moderate coronary artery calcifications. There are mildly enlarged  mediastinal and hilar lymph nodes, for example 13 x 8 mm left prevascular and 15 x 13 mm right hilar. No suspicious lytic or blastic skeletal lesion is seen.     IMPRESSION: 1.  Negative evaluation for pulmonary embolism. 2.  New small left pleural effusion. Increased irregular left basilar airspace disease and ongoing 16mm nodule, which was hypermetabolic on recent PET/CT. Malignancy with pleural metastatic disease is not excluded. Consider diagnostic thoracentesis. 3.  New/increased airspace disease also in the dependent and basilar right lung suggesting pneumonia or pneumonitis. 4.  Suspected mild pulmonary edema. 5.  Moderate pericardial effusion, mildly increased. 6.  New small partially loculated left pleural effusion. 7.  Mildly enlarged mediastinal and hilar lymph nodes, which may be inflammatory or metastatic in nature. In accordance with [place] Act 112,  the patient will receive a letter notifying them to follow up with their physician.    X-RAY CHEST 1 VIEW    Result Date: 7/19/2024  CLINICAL HISTORY: Chest Pain/Arrhythmia COMPARISON: June 22, 2024 COMMENT: TECHNIQUE: Single frontal view of the chest was performed. LINES/TUBES/HARDWARE: Monitoring leads/wires overlie the patient. LUNGS AND PLEURA: Vascular congestion with diffuse interstitial airspace opacity. Basilar pleural-parenchymal opacity with obscuration of the left hemidiaphragm, likely related to a small effusion with underlying atelectasis. CARDIOMEDIASTINUM: Unchanged marked cardiomegaly. Aortic atherosclerosis. SKELETON/OTHER: No acute osseous abnormality.     IMPRESSION: Unchanged marked cardiomegaly (with known pericardial effusion on prior CT) and vascular congestion with diffuse interstitial opacity likely reflecting mild edema. Coarse basilar interstitial opacities related to known fibrotic lung disease is less well appreciated radiographically. Left basilar pleural-parenchymal opacity probably related to a concomitant small pleural effusion  and associated retrocardiac atelectasis or consolidation.             ASSESSMENT AND PLAN     In brief, Arielle Felix is a 63 y.o. female Cutaneous Systemic Scleroderma (dx 1998) c/b ILD and Group 1 Pulmonary HTN, HFpEF, HTN, Raynaud's Disease, protein calorie malnutrition, hx OF PE  presenting from outpt TTE for palpitations.    Impression:  Left pleural effusion  Left pleuritic chest pain  ILD from systemic sclerosis  Sinus tachycardia  HFpEF  Pulmonary Hypertension, group 1  Pericardial Effusion  LLL nodule - hypermetabolic on PET    Initial concern for chest pain 2/2 pleuritis, but on further discussion with patient, pleuritic chest pain has been chronic and has not responded to steroids. CT chest similar to previous diffuse groundglass opacity and fibrosis at left lung base expect for new left pleural effusion, so unlikely ILD flare.      Recommendations:  - No indication for additional dose of toci, continue with regular schedule  - Will hold off on steroids at this time  - Consult IR for thoracentesis of L pleural effusion to evaluate for malignancy, send fluid for protein, LDH, pH, glucose, cell count, culture, and cytology  - Diuresis prn  - C/w sildenafil and macitentan for pHTN  - Hold off on EBUS for now pending results of thora  - Incentive spirometer/mobilization/OOB to chair as tolerated    Thank you for the consult and allowing us to be part of this patient's care. Note is not complete until attending attestation is complete.       [place] ([facility] [place] [facility] [place] DO  Pulmonary & Critical Care Fellow, PGY-4  7/20/2024  4:09 PM  Pager: [phone]

## 2024-07-20 NOTE — H&P
"   Hospital Medicine Service -  History & Physical        CHIEF COMPLAINT   Heart Racing     HISTORY OF PRESENT ILLNESS      Arielle Felix is a 63 y.o. female with a past medical history of pHTN, HTN, Raynaud's Disease, Cutaneous Systemic Scleroderma (dx 1998), ILD, PE (3/2023).  who presents with concern for palpitaions. Patient notes that she was planned for lung biopsy today but they decided to abort in setting of persistent tachycardia. She reportedly had a repeat TTE which was read by her primary cardiologist (Dr Arvizu); results were discussed with ED physician who noted.     \"I spoke with Dr. Arvizu.  He thinks her CP is likely noncardiac.  She had a cardiac CTA earlier that showed nonobstructive disease. He looked at the Echo and there is a small pericardial effusion without any evidence of tamponade.  The wall motion is normal.  HR is fast.  She has a normal EF.\"    Vitals in the ED are tachycardic to 110s. She remains on her home supplemental O2 requirements of 2L NC. Labs are notable for an elevated but flat hs-trop (90, 89) and elevated BNP. Started on IV diuresis in ED. Will admit for further monitoring and management. Per my discussion with patient she wishes to remain full code.     PAST MEDICAL AND SURGICAL HISTORY      Past Medical History:   Diagnosis Date    De Quervain's tenosynovitis     Essential (primary) hypertension     Follicular carcinoma of thyroid (CMS/HCC)     Gastro-esophageal reflux     Hand ulceration (CMS/HCC)     Hyperlipidemia, unspecified     Interstitial lung disease (CMS/HCC)     Leg ulcer (CMS/HCC)     Lung nodule     Lupus (CMS/HCC)     Osteomyelitis of hand (CMS/HCC)     Osteoporosis     Pulmonary hypertension (CMS/HCC)     Raynaud's disease     Severe    Reflux esophagitis     Scleroderma (CMS/HCC)     Screening mammogram for breast cancer 05/04/2021    BI-RADS category: 1 - Negative (care everywhere @ [facility])       Past Surgical History:   Procedure Laterality Date    " CARDIAC CATHETERIZATION      COLONOSCOPY      HERNIA REPAIR       PCP: Sara Simmons MD    MEDICATIONS      Prior to Admission medications    Medication Sig Start Date End Date Taking? Authorizing Provider   amLODIPine (NORVASC) 5 mg tablet Take 10 mg by mouth every morning.    Kera Hidalgo MD   atorvastatin (LIPITOR) 40 mg tablet Take 1 tablet (40 mg total) by mouth daily. 6/20/24 9/18/24  Timothy Arvizu DO   biotin 1 mg tablet Take 1,000 mcg by mouth daily.    Romel Hidalgo MD   cholecalciferol, vitamin D3, 1,000 unit (25 mcg) tablet Take 1,000 Units by mouth daily.    Romel Hidalgo MD   cyanocobalamin, vitamin B-12, 1,000 mcg capsule Take 1,000 mcg by mouth daily. 2/14/20   Romel Hidalgo MD   docusate sodium (COLACE) 100 mg capsule Take 100 mg by mouth daily as needed for constipation.    Romel Hidalgo MD   levothyroxine (SYNTHROID) 100 mcg tablet Take 100 mcg by mouth daily. 7/6/20   Romel Hidalgo MD   lidocaine (ASPERCREME) 4 % adhesive patch,medicated topical patch Apply 1 patch topically daily as needed (pain). Remove & discard patch within 12 hours or as directed by prescriber. 6/20/24 7/20/24  Timothy Arvizu DO   macitentan (OPSUMIT) 10 mg tablet tablet Take 1 tablet (10 mg total) by mouth daily. 8/29/23   Radha Lal CRNP   meclizine (ANTIVERT) 25 mg tablet Take 25 mg by mouth daily as needed.    Romel Hidalgo MD   mupirocin (BACTROBAN) 2 % ointment Apply 1 application. topically daily. Behind ears    Kera Hidalgo MD   pantoprazole (PROTONIX) 40 mg EC tablet Take 1 tablet (40 mg total) by mouth daily Indications: stress ulcer prevention. 4/20/24 6/20/24  Gerard Quach DO   predniSONE (DELTASONE) 5 mg tablet Take 5 mg by mouth daily. Once a day    ProviderKera MD   sildenafiL, pulm.hypertension, (REVATIO) 20 mg tablet Take 1 tablet (20 mg total) by mouth 3 (three) times a day. 1/4/24   Timothy Arvizu DO   sodium  chloride (OCEAN) 0.65 % nasal spray Administer 2 sprays into each nostril 2 (two) times a day as needed for congestion. 24  Bhargavi Mae MD   tocilizumab (ACTEMRA) 200 mg/10 mL (20 mg/mL) solution Infuse 8 mg/kg into a venous catheter every 28 (twentyeight) days.    Provider, Kera Urena MD       ALLERGIES      Aspirin, Ibuprofen, Iodine, Iodine and iodide containing products, Penicillins, Sulfa (sulfonamide antibiotics), Clindamycin, Hydrochlorothiazide, Oxycodone, Sulfamethoxazole-trimethoprim, and Latex    FAMILY HISTORY      Family History   Problem Relation Age of Onset    Heart disease Biological Brother         stent    Breast cancer Niece     Cervical cancer Neg Hx     Uterine cancer Neg Hx     Colon cancer Neg Hx     Ovarian cancer Neg Hx      SOCIAL HISTORY      Social History     Socioeconomic History    Marital status:    Tobacco Use    Smoking status: Former     Types: Cigarettes     Quit date: 9/15/2005     Years since quittin.8    Smokeless tobacco: Never    Tobacco comments:     Would smoke 1/2 of 1/2 PPD, quit in    Vaping Use    Vaping Use: Never used   Substance and Sexual Activity    Alcohol use: Never    Drug use: Never    Sexual activity: Not Currently     Birth control/protection: Post-menopausal     Social Determinants of Health     Food Insecurity: No Food Insecurity (2024)    Hunger Vital Sign     Worried About Running Out of Food in the Last Year: Never true     Ran Out of Food in the Last Year: Never true   Transportation Needs: No Transportation Needs (2024)    PRAPARE - Transportation     Lack of Transportation (Medical): No     Lack of Transportation (Non-Medical): No   Housing Stability: Low Risk  (2024)    Housing Stability Vital Sign     Unable to Pay for Housing in the Last Year: No     Number of Places Lived in the Last Year: 1     Unstable Housing in the Last Year: No       REVIEW OF SYSTEMS      All other systems reviewed and  negative except as noted in HPI    PHYSICAL EXAMINATION      Temp:  [36.6 °C (97.9 °F)] 36.6 °C (97.9 °F)  Heart Rate:  [] 111  Resp:  [18-29] 20  BP: (147-174)/(72-80) 159/76  There is no height or weight on file to calculate BMI.    General Appearance: chronically ill appearing, NAD  HEENT: NCAT, EOMI, dry MM  Lungs: normal expansion, decreased air movement, mild crackles at bases  Chest: tachycardic heart rate with regular rhythm, TTP across chest b/l  Abdomen: soft, periumbilical tenderness  Skin: scarring hyperpigmentation on the scalp, forehead and cheeks; diffuse skin thickening involving the fingers, hands, forearms, lower extremities, face and abdomen  Neuro: AAOx3, grossly intact    LABS / IMAGING / EKG        Imaging  Significant findings include:   1.  Negative evaluation for pulmonary embolism.  2.  New small left pleural effusion. Increased irregular left basilar airspace  disease and ongoing 16mm nodule, which was hypermetabolic on recent PET/CT.  Malignancy with pleural metastatic disease is not excluded. Consider diagnostic  thoracentesis.  3.  New/increased airspace disease also in the dependent and basilar right lung  suggesting pneumonia or pneumonitis.  4.  Suspected mild pulmonary edema.  5.  Moderate pericardial effusion, mildly increased.  6.  New small partially loculated left pleural effusion.  7.  Mildly enlarged mediastinal and hilar lymph nodes, which may be inflammatory  or metastatic in nature.    ASSESSMENT AND PLAN           Interstitial lung disease (CMS/HCC)  Assessment & Plan  She has ongoing bouts of SOB and require 2L NC at baseline. Follows with pulmonary (Dr. Yovani Wasserman) and Cardiology (Dr. Arvizu). Recently completed course of colchicine and prednisone for pericarditis. Current reigmen includes sildenafil and macitentan.   - Continue sildenafil and macitentan.   - She previously required treatment with steroids and tocilizumab, appreciate pulmonology input regarding  need during this hospitalization     * Pulmonary edema, acute (CMS/HCC)  Assessment & Plan  Known history of chronic hypoxemic respiratory failure in setting of ILD from systemic sclerosis. CT scan concerning for new small left pleural effusion and suspected mild pulmonary edema. BNP elevated compared to prior. Favor volume oveload on the background of her known ILD as contributing to her cardiopulmonary complaints. Low concern for active infection despite air space opacities noted on CT. Given trial of IV diuresis in ED.  - Monitor clinical response to IV diuresis and provide further treatments accordingly  - Follow ESR and CRP to further elucidate inflammatory etiology   - Defer ABX for now    Multiple open wounds of lower extremity  Assessment & Plan  Continue local wound care as per home regimen     Acute chest pain  Assessment & Plan  Notes intermittent episodes of chest pain which has been ongoing, worse in last few days. History and workup thus far less suggestive of ACS, suspect MSK etiology.   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 Currently on amlodipine 10mg in the morning and 10mg in the evening if she needs it. Also on sildenafil  - Continue home reigmen as prescribed           VTE Assessment: Padua    VTE Prophylaxis: Current anticoagulants:    None    Code Status: Full Code      Elects to have her daughter Tiffnaie ([phone]) be her surrogate decision maker.     Eladio Patel MD  7/19/2024

## 2024-07-20 NOTE — ASSESSMENT & PLAN NOTE
Notes intermittent episodes of chest pain which has been ongoing, worse in last few days. History and workup thus far less suggestive of ACS, suspect MSK etiology as pain is reproducible on palpation.  - Maintain on supportive treatment  -endsores left side discomfort without worsening today  Appreciate pulm recs:  IR thoracentesis recommended with cyto (however, not enough fluid, could not do - US completed); will need eventual ION with EBUS for biopsy  Hold off on further steroids (did not respond in the past)  cont sildenafil and macitentan for pHTN per Dr Arvizu--No indication for additional dose of toci, continue with regular schedule  Diuresis prn, IS/Mobilize/OOB chair

## 2024-07-20 NOTE — CONSULTS
"Nutrition Assessment    Recommendations      Regular diet  Boost Plus BID      Clinical Course: Patient is a 63 y.o. female who was admitted on 7/19/2024 with a diagnosis of Shortness of breath [R06.02]  Pericardial effusion [I31.39]  Pleural effusion [J90]  Pulmonary edema, acute (CMS/HCC) [J81.0]  Chest pain, unspecified type [R07.9].     Past Medical History:   Diagnosis Date    De Quervain's tenosynovitis     Essential (primary) hypertension     Follicular carcinoma of thyroid (CMS/HCC)     Gastro-esophageal reflux     Hand ulceration (CMS/HCC)     Hyperlipidemia, unspecified     Interstitial lung disease (CMS/HCC)     Leg ulcer (CMS/HCC)     Lung nodule     Lupus (CMS/HCC)     Osteomyelitis of hand (CMS/HCC)     Osteoporosis     Pulmonary hypertension (CMS/HCC)     Raynaud's disease     Severe    Reflux esophagitis     Scleroderma (CMS/HCC)     Screening mammogram for breast cancer 05/04/2021    BI-RADS category: 1 - Negative (care everywhere @ [facility])     Past Surgical History:   Procedure Laterality Date    CARDIAC CATHETERIZATION      COLONOSCOPY      HERNIA REPAIR         Reason for Assessment  Reason For Assessment: identified at risk by screening criteria ([facility])     [facility] Nutrition Screen Tool  Has patient lost weight without trying?: 1-->Yes, 2-13 lbs  Has patient been eating poorly due to decreased appetite?: 1-->Yes  [facility] Nutrition Screen Score: 2     Nutrition/Diet History  Appetite Prior to Admission: Fair-25-50%  Intake (%): 100%     Physical Findings  Overall Physical Appearance: generalized wasting  Skin: other (see comments) (L foot open wound)     RETS18 Physical Appearance  Overall Physical Appearance: generalized wasting  Skin: other (see comments) (L foot open wound)     Nutrition Order  Nutrition Order: meets nutritional requirements  Nutrition Order Comments: regular diet     Anthropometrics  Height: 167.6 cm (5' 6\")     Current Weight  Weight Method: Bed scale  Weight: 49.5 kg (109 lb 2 oz) "     Ideal Body Weight (IBW)  Ideal Body Weight (IBW) (kg): 59.58  % Ideal Body Weight: 83.09     Usual Body Weight (UBW)  Usual Body Weight: 50.8 kg (112 lb)      Body Mass Index (BMI)  BMI (Calculated): 17.6     [email or web address] Lab Results  Lab Results Reviewed: reviewed   BMP Results         07/20/24 07/19/24 06/22/24     0415 1432 1346     143 141    K 4.2 4.0 3.8    Cl 107 110 109    CO2 25 23 20    Glucose 85 93 140    BUN 17 15 14    Creatinine 1.2 1.0 1.0    Calcium 9.3 9.7 9.5    Anion Gap 10 10 12    EGFR 51.0 >60.0 >60.0           Comment for K at 1432 on 07/19/24: Results obtained on plasma. Plasma Potassium values may be up to 0.4 mEQ/L less than serum values. The differences may be greater for patients with high platelet or white cell counts.    Comment for K at 1346 on 06/22/24: Results obtained on plasma. Plasma Potassium values may be up to 0.4 mEQ/L less than serum values. The differences may be greater for patients with high platelet or white cell counts.    Comment for EGFR at 0415 on 07/20/24: Calculation based on the Chronic Kidney Disease Epidemiology Collaboration (CKD-EPI) equation refit without adjustment for race.    Comment for EGFR at 1432 on 07/19/24: Calculation based on the Chronic Kidney Disease Epidemiology Collaboration (CKD-EPI) equation refit without adjustment for race.    Comment for EGFR at 1346 on 06/22/24: Calculation based on the Chronic Kidney Disease Epidemiology Collaboration (CKD-EPI) equation refit without adjustment for race.                Medications  Pertinent Medications Reviewed: reviewed   Scheduled Meds:   amLODIPine  10 mg oral q AM    atorvastatin  40 mg oral Daily (6p)    cholecalciferol (vitamin D3)  1,000 Units oral Daily    [START ON 7/21/2024] levothyroxine  100 mcg oral Daily (6:30a)    macitentan  10 mg oral Daily    mupirocin  1 Application Topical Daily    pantoprazole  20 mg oral Daily    sildenafiL (pulm.hypertension)  20 mg oral TID    sodium chloride   2 spray Each Nostril Daily         Calorie Requirements  Estimated kCal Needs: Actual Body Weight  Estimated Calorie Need Method: kcal/kg  Calorie/kg Recommended: 30-35  Calorie Recommendations: 0051-0379     Protein Requirements  Recommended Dosing Weight (Estimated Protein Needs): Actual Body Weight  Est Protein Requirement Amount (gms/kg): 1.5-->1.5 gm protein  Protein Recommendations: 74     Fluid requirements: 1200-1400ml,(25-30ml/kg )     Dosing weight: 49.5 kg, ABW      Skin: L foot wound     NFPE: generalized wasting     Clinical comments:  Nutrition eval for MST score- 2-13 lb wt loss. Pt states decreased appetite, tries to eat  well. Has 24 hour care, meals provided. NKFA. Noted progressive wt loss, 3 lb over the past year, not severe but concerning for continued wt loss.   Pt willing to try Boost- will send BID.   Regular diet to offer more food choices, pt does not add salt to food.       Goals:> 75% meals   Monitor: Labs, skin, po diet lalitha, wts     Recommendations: See above     PES  Statement: PES Statement  Nutrition Diagnosis: Inadequate Energy Intake  Related To:: Decreased ability to consume sufficient energy  As Evidenced By:: unintentional wt loss  Nutritional Needs Met?: No                                   Date: 07/20/24  Signature: Kimberlee Ch RD

## 2024-07-20 NOTE — PROGRESS NOTES
Hospital Medicine     Daily Progress Note       SUBJECTIVE   Interval History: Patient seen and examined at bedside, she has chest pain which is reproducible to touch, and is tachycardic.   OBJECTIVE      Vital signs in last 24 hours:  Temp:  [36.3 °C (97.4 °F)-36.9 °C (98.4 °F)] 36.4 °C (97.6 °F)  Heart Rate:  [] 90  Resp:  [18-29] 18  BP: (113-174)/(55-79) 125/59    Intake/Output Summary (Last 24 hours) at 7/20/2024 1349  Last data filed at 7/20/2024 0800  Gross per 24 hour   Intake 725 ml   Output 975 ml   Net -250 ml       PHYSICAL EXAMINATION      Physical Exam  General:chronically ill looking  HEENT: Symmetric, PERRL/EOMI, moist membranes, NCAT  Cardiovascular: Regular rate and rhythm, normal S1/S2, no murmurs, rubs, gallops, no JVD  Pulmonary: Clear to auscultation bilaterally, no wheezing, rhonchi  GI: Soft, nontender, nondistended, bowel sounds normal, no organomegaly  Musculoskeletal: Normal bulk and tone, normal ROM  SKIN: scarring, hyper/hypopigmentation on face, scalp, upper and lower extremities,  Extremities: Distal pulses intact, No LE edema bilaterally  Neuro: CN2-12 intact, sensation intact, with no motor deficits  Psych: Normal mood and affect        LINES, CATHETERS, DRAINS, AIRWAYS, AND WOUNDS   Lines, Drains, and Airways:  Wounds (agree with documentation and present on admission):  Peripheral IV (Adult) 07/19/24 Anterior;Left Forearm (Active)   Number of days: 1       External Urinary Catheter Female (one size) (Active)   Number of days: 1         Comments:    LABS / IMAGING / TELE      Labs  Results from last 7 days   Lab Units 07/20/24  0414   WBC K/uL 7.27   HEMOGLOBIN g/dL 9.8*   HEMATOCRIT % 32.0*   PLATELETS K/uL 206          Imaging  Results from last 7 days   Lab Units 07/20/24  0414   WBC K/uL 7.27   HEMOGLOBIN g/dL 9.8*   HEMATOCRIT % 32.0*   PLATELETS K/uL 206      Results from last 7 days   Lab Units 07/20/24  0415   SODIUM mEQ/L 142   POTASSIUM mEQ/L 4.2   CHLORIDE mEQ/L  107   CO2 mEQ/L 25   BUN mg/dL 17   CREATININE mg/dL 1.2   GLUCOSE mg/dL 85   CALCIUM mg/dL 9.3        ASSESSMENT AND PLAN      Interstitial lung disease (CMS/HCC)  Assessment & Plan  -She has ongoing bouts of SOB and require 2L NC at baseline.  - Follows with pulmonary (Dr. Yovani Wasserman) and Cardiology (Dr. Arvizu). Recently completed course of colchicine and prednisone for pericarditis. -Current reigmen includes sildenafil and macitentan.   - Continue sildenafil and macitentan.   - She previously required treatment with steroids and tocilizumab, appreciate pulmonology input regarding need during this hospitalization     Multiple open wounds of lower extremity  Assessment & Plan  Continue local wound care as per home regimen     Acute chest pain  Assessment & Plan  Notes intermittent episodes of chest pain which has been ongoing, worse in last few days. History and workup thus far less suggestive of ACS, suspect MSK etiology as pain is reproducible on palpation.  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 Currently on amlodipine 10mg in the morning and 10mg in the evening if she needs it. Also on sildenafil  - Continue home reigmen as prescribed      * Pulmonary edema, acute (CMS/HCC)  Assessment & Plan  Known history of chronic hypoxemic respiratory failure in setting of ILD from systemic sclerosis. CT scan concerning for new small left pleural effusion and suspected mild pulmonary edema. BNP elevated compared to prior. Favor volume oveload on the background of her known ILD as contributing to her cardiopulmonary complaints. Low concern for active infection despite air space opacities noted on CT. Given trial of IV diuresis in ED.  - Monitor clinical  response to IV diuresis and provide further treatments accordingly  - Follow ESR and CRP to further elucidate inflammatory etiology   - Defer ABX for now         VTE Assessment: Padua    VTE Prophylaxis:  Current anticoagulants:    None      Code Status: Full Code      Estimated Discharge Date: 7/21/2024   Disposition Planning:Pending freddy Figueroa MD  7/20/2024

## 2024-07-20 NOTE — ASSESSMENT & PLAN NOTE
-She has ongoing bouts of SOB and require 2L NC at baseline.  - Follows with pulmonary (Dr. Yovani Wasserman) and Cardiology (Dr. Arvizu). Recently completed course of colchicine and prednisone for pericarditis. -Current reigmen includes sildenafil and macitentan.   - Continue sildenafil and macitentan.   - She previously required treatment with steroids and tocilizumab, appreciate pulmonology input regarding need during this hospitalization

## 2024-07-20 NOTE — PLAN OF CARE
Nutrition eval completed  Reg diet  Boost plus   Problem: Adult Inpatient Plan of Care  Goal: Plan of Care Review  Outcome: Progressing

## 2024-07-20 NOTE — PROGRESS NOTES
Spoke with patient and confirmed with medication list from [facility] and/or patient's own pharmacy to complete the medication reconciliation.     Prior to admission medication list:    Current Outpatient Medications:     amLODIPine (NORVASC) 5 mg tablet, Take 10 mg by mouth every morning.    atorvastatin (LIPITOR) 40 mg tablet, Take 1 tablet (40 mg total) by mouth daily.    benzonatate (TESSALON) 100 mg capsule, Take 100 mg by mouth 3 (three) times a day as needed for cough.    biotin 1 mg tablet, Take 1,000 mcg by mouth daily.    cholecalciferol, vitamin D3, 1,000 unit (25 mcg) tablet, Take 1,000 Units by mouth daily.    collagenase (SantyL) ointment, Apply 1 Application topically daily.    cyanocobalamin, vitamin B-12, 1,000 mcg capsule, Take 1,000 mcg by mouth daily.    docusate sodium (COLACE) 100 mg capsule, Take 100 mg by mouth daily as needed for constipation.    levothyroxine (SYNTHROID) 100 mcg tablet, Take 100 mcg by mouth daily. BRAND ONLY    lidocaine (ASPERCREME) 4 % adhesive patch,medicated topical patch, Apply 1 patch topically daily as needed (pain). Remove & discard patch within 12 hours or as directed by prescriber.    loperamide (IMODIUM) 2 mg capsule, Take 2 mg by mouth every 8 (eight) hours as needed for diarrhea.    macitentan (OPSUMIT) 10 mg tablet tablet, Take 1 tablet (10 mg total) by mouth daily.    meclizine (ANTIVERT) 25 mg tablet, Take 25 mg by mouth daily as needed.    MEDIHONEY, HONEY, TOP, Apply topically daily.    mupirocin (BACTROBAN) 2 % ointment, Apply 1 application. topically daily. Behind ears    pantoprazole (PROTONIX) 20 mg EC tablet, Take 20 mg by mouth daily.    sildenafiL, pulm.hypertension, (REVATIO) 20 mg tablet, Take 1 tablet (20 mg total) by mouth 3 (three) times a day.    sodium chloride (OCEAN) 0.65 % nasal spray, Administer 2 sprays into each nostril daily.    tocilizumab (ACTEMRA) 200 mg/10 mL (20 mg/mL) solution, Infuse 8 mg/kg into a venous catheter every 28  (twentyeight) days.    Comments about home medications:  -Patient is taking amlodipine 5mg 2 tablets (10mg) daily.  -Per telephone encounter from 7/12/24 patient is not taking colchicine for a pericardial effusion.  -Patient states she is prescribed Synthroid 100mcg BRAND ONLY.  -Patient finished course of prednisone.  -Patient is due for Actemra infusion on July 30.      Compliance:   -Consistent fills, no compliance concerns based on interview

## 2024-07-21 ENCOUNTER — APPOINTMENT (INPATIENT)
Dept: RADIOLOGY | Facility: HOSPITAL | Age: 64
DRG: 291 | End: 2024-07-21
Payer: COMMERCIAL

## 2024-07-21 PROBLEM — J90 PLEURAL EFFUSION: Status: ACTIVE | Noted: 2024-07-21

## 2024-07-21 LAB — ERYTHROCYTE [SEDIMENTATION RATE] IN BLOOD BY WESTERGREN METHOD: 105 MM/HR

## 2024-07-21 PROCEDURE — 70450 CT HEAD/BRAIN W/O DYE: CPT

## 2024-07-21 PROCEDURE — 21400000 HC ROOM AND CARE CCU/INTERMEDIATE

## 2024-07-21 PROCEDURE — 63600000 HC DRUGS/DETAIL CODE: Mod: JZ

## 2024-07-21 PROCEDURE — 63700000 HC SELF-ADMINISTRABLE DRUG: Performed by: STUDENT IN AN ORGANIZED HEALTH CARE EDUCATION/TRAINING PROGRAM

## 2024-07-21 PROCEDURE — 200200 PR NO CHARGE: Performed by: HOSPITALIST

## 2024-07-21 PROCEDURE — 63700000 HC SELF-ADMINISTRABLE DRUG: Performed by: INTERNAL MEDICINE

## 2024-07-21 PROCEDURE — 36415 COLL VENOUS BLD VENIPUNCTURE: CPT | Performed by: STUDENT IN AN ORGANIZED HEALTH CARE EDUCATION/TRAINING PROGRAM

## 2024-07-21 PROCEDURE — 99233 SBSQ HOSP IP/OBS HIGH 50: CPT | Performed by: INTERNAL MEDICINE

## 2024-07-21 PROCEDURE — 63600000 HC DRUGS/DETAIL CODE: Mod: JZ | Performed by: INTERNAL MEDICINE

## 2024-07-21 PROCEDURE — 85652 RBC SED RATE AUTOMATED: CPT | Performed by: STUDENT IN AN ORGANIZED HEALTH CARE EDUCATION/TRAINING PROGRAM

## 2024-07-21 RX ORDER — LIDOCAINE AND PRILOCAINE 25; 25 MG/G; MG/G
CREAM TOPICAL
Status: DISCONTINUED | OUTPATIENT
Start: 2024-07-21 | End: 2024-07-24 | Stop reason: HOSPADM

## 2024-07-21 RX ORDER — DIPHENHYDRAMINE HCL 25 MG
25 CAPSULE ORAL ONCE
Status: DISCONTINUED | OUTPATIENT
Start: 2024-07-22 | End: 2024-07-21

## 2024-07-21 RX ORDER — ONDANSETRON HYDROCHLORIDE 2 MG/ML
4 INJECTION, SOLUTION INTRAVENOUS ONCE
Status: COMPLETED | OUTPATIENT
Start: 2024-07-21 | End: 2024-07-21

## 2024-07-21 RX ORDER — DIPHENHYDRAMINE HCL 50 MG/ML
25 VIAL (ML) INJECTION ONCE
Status: COMPLETED | OUTPATIENT
Start: 2024-07-21 | End: 2024-07-21

## 2024-07-21 RX ORDER — ONDANSETRON 4 MG/1
4 TABLET, ORALLY DISINTEGRATING ORAL ONCE
Status: COMPLETED | OUTPATIENT
Start: 2024-07-21 | End: 2024-07-21

## 2024-07-21 RX ORDER — LIDOCAINE 560 MG/1
1 PATCH PERCUTANEOUS; TOPICAL; TRANSDERMAL DAILY
Status: DISCONTINUED | OUTPATIENT
Start: 2024-07-21 | End: 2024-07-24 | Stop reason: HOSPADM

## 2024-07-21 RX ORDER — ONDANSETRON HYDROCHLORIDE 2 MG/ML
INJECTION, SOLUTION INTRAVENOUS
Status: COMPLETED
Start: 2024-07-21 | End: 2024-07-21

## 2024-07-21 RX ORDER — MORPHINE SULFATE 2 MG/ML
2 INJECTION, SOLUTION INTRAMUSCULAR; INTRAVENOUS ONCE
Status: COMPLETED | OUTPATIENT
Start: 2024-07-21 | End: 2024-07-21

## 2024-07-21 RX ADMIN — LEVOTHYROXINE SODIUM 100 MCG: 0.1 TABLET ORAL at 06:11

## 2024-07-21 RX ADMIN — SALINE NASAL SPRAY 2 SPRAY: 1.5 SOLUTION NASAL at 08:40

## 2024-07-21 RX ADMIN — MUPIROCIN 1 APPLICATION: 20 OINTMENT TOPICAL at 08:40

## 2024-07-21 RX ADMIN — Medication 1000 UNITS: at 08:39

## 2024-07-21 RX ADMIN — ATORVASTATIN CALCIUM 40 MG: 40 TABLET, FILM COATED ORAL at 17:19

## 2024-07-21 RX ADMIN — METHYLPREDNISOLONE SODIUM SUCCINATE 40 MG: 40 INJECTION, POWDER, FOR SOLUTION INTRAMUSCULAR; INTRAVENOUS at 21:32

## 2024-07-21 RX ADMIN — SILDENAFIL 20 MG: 20 TABLET, FILM COATED ORAL at 08:38

## 2024-07-21 RX ADMIN — ONDANSETRON 4 MG: 2 INJECTION INTRAMUSCULAR; INTRAVENOUS at 21:31

## 2024-07-21 RX ADMIN — LIDOCAINE AND PRILOCAINE: 25; 25 CREAM TOPICAL at 12:36

## 2024-07-21 RX ADMIN — ACETAMINOPHEN 975 MG: 325 TABLET ORAL at 00:08

## 2024-07-21 RX ADMIN — ONDANSETRON HYDROCHLORIDE 4 MG: 2 INJECTION, SOLUTION INTRAVENOUS at 21:31

## 2024-07-21 RX ADMIN — DIPHENHYDRAMINE HYDROCHLORIDE 25 MG: 50 INJECTION INTRAMUSCULAR; INTRAVENOUS at 21:32

## 2024-07-21 RX ADMIN — SILDENAFIL 20 MG: 20 TABLET, FILM COATED ORAL at 13:35

## 2024-07-21 RX ADMIN — AMLODIPINE BESYLATE 10 MG: 10 TABLET ORAL at 08:39

## 2024-07-21 RX ADMIN — LIDOCAINE 4% 1 PATCH: 40 PATCH TOPICAL at 18:06

## 2024-07-21 RX ADMIN — MORPHINE SULFATE 2 MG: 2 INJECTION, SOLUTION INTRAMUSCULAR; INTRAVENOUS at 18:05

## 2024-07-21 RX ADMIN — PANTOPRAZOLE SODIUM 20 MG: 20 TABLET, DELAYED RELEASE ORAL at 08:39

## 2024-07-21 RX ADMIN — ACETAMINOPHEN 975 MG: 325 TABLET ORAL at 17:18

## 2024-07-21 RX ADMIN — ACETAMINOPHEN 975 MG: 325 TABLET ORAL at 08:38

## 2024-07-21 ASSESSMENT — COGNITIVE AND FUNCTIONAL STATUS - GENERAL
WALKING IN HOSPITAL ROOM: 3 - A LITTLE
MOVING TO AND FROM BED TO CHAIR: 3 - A LITTLE
MOVING TO AND FROM BED TO CHAIR: 3 - A LITTLE
STANDING UP FROM CHAIR USING ARMS: 3 - A LITTLE
CLIMB 3 TO 5 STEPS WITH RAILING: 3 - A LITTLE
WALKING IN HOSPITAL ROOM: 3 - A LITTLE
STANDING UP FROM CHAIR USING ARMS: 3 - A LITTLE
CLIMB 3 TO 5 STEPS WITH RAILING: 3 - A LITTLE

## 2024-07-21 NOTE — PROGRESS NOTES
"     Pulmonary  Progress Note       SUBJECTIVE     Chest pain improved with lidocaine and tylenol overnight. Pt seen and examined at night. Pt states that pleuritic chest pain has worsened.      OBJECTIVE     Vital Signs:     Temp (24hrs), Av.7 °C (98 °F), Min:36.4 °C (97.6 °F), Max:36.9 °C (98.4 °F)      Visit Vitals  BP (!) 123/59 (BP Location: Left upper arm, Patient Position: Lying)   Pulse 85   Temp 36.6 °C (97.9 °F) (Oral)   Resp 18   Ht 1.676 m (5' 6\")   Wt 49.5 kg (109 lb 2 oz)   LMP  (LMP Unknown)   SpO2 97%   BMI 17.61 kg/m²     Vitals:    24 0008 24 0411 24 0446 24 0730   BP: 104/68  118/64 (!) 123/59   BP Location: Right upper arm  Left upper arm Left upper arm   Patient Position: Lying  Lying Lying   Pulse: 78 96 81 85   Resp: (!) 22  18 18   Temp: 36.6 °C (97.9 °F)  36.9 °C (98.4 °F) 36.6 °C (97.9 °F)   TempSrc: Oral  Oral Oral   SpO2: 98%  99% 97%   Weight:       Height:             I/O's:  No intake or output data in the 24 hours ending 24 0803      Medications:     amLODIPine  10 mg oral q AM    atorvastatin  40 mg oral Daily (6p)    cholecalciferol (vitamin D3)  1,000 Units oral Daily    levothyroxine  100 mcg oral Daily (6:30a)    macitentan  10 mg oral Daily    mupirocin  1 Application Topical Daily    pantoprazole  20 mg oral Daily    sildenafiL (pulm.hypertension)  20 mg oral TID    sodium chloride  2 spray Each Nostril Daily       Anti-infectives (From admission, onward)      Start     Dose/Rate Route Frequency Ordered Stop    24 0900  mupirocin (BACTROBAN) 2 % ointment 1 Application         1 Application Topical Daily 24 5834                 Physical Exam     Physical Exam:   General: NAD, resting comfortably in bed  Cardiac: RRR, no murmurs, rubs and gallops  Respiratory: Crackles at LL base, normal effort  Abdominal: soft, NT  MSK: No LLE erythema, swelling or edema  Neuro: AAOx3       Diagnostic Data     Labs:  I have personally reviewed all " pertinent patient laboratory results. Labs of note discussed below:    CBC Results         07/20/24 07/19/24 06/22/24     0414 1432 1346    WBC 7.27 9.78 9.83    RBC 3.98 4.46 3.95    HGB 9.8 10.9 9.9    HCT 32.0 35.9 34.0    MCV 80.4 80.5 86.1    MCH 24.6 24.4 25.1    MCHC 30.6 30.4 29.1     212 359          BMP Results         07/20/24 07/19/24 06/22/24     0415 1432 1346     143 141    K 4.2 4.0 3.8    Cl 107 110 109    CO2 25 23 20    Glucose 85 93 140    BUN 17 15 14    Creatinine 1.2 1.0 1.0    Calcium 9.3 9.7 9.5    Anion Gap 10 10 12    EGFR 51.0 >60.0 >60.0           Comment for K at 1432 on 07/19/24: Results obtained on plasma. Plasma Potassium values may be up to 0.4 mEQ/L less than serum values. The differences may be greater for patients with high platelet or white cell counts.    Comment for K at 1346 on 06/22/24: Results obtained on plasma. Plasma Potassium values may be up to 0.4 mEQ/L less than serum values. The differences may be greater for patients with high platelet or white cell counts.    Comment for EGFR at 0415 on 07/20/24: Calculation based on the Chronic Kidney Disease Epidemiology Collaboration (CKD-EPI) equation refit without adjustment for race.    Comment for EGFR at 1432 on 07/19/24: Calculation based on the Chronic Kidney Disease Epidemiology Collaboration (CKD-EPI) equation refit without adjustment for race.    Comment for EGFR at 1346 on 06/22/24: Calculation based on the Chronic Kidney Disease Epidemiology Collaboration (CKD-EPI) equation refit without adjustment for race.          Microbiology Results       ** No results found for the last 720 hours. **          ABG Results         04/13/23 1949    Source Of Oxygen nc              Imaging:    I have reviewed all pertinent imaging.    No results found.       ASSESSMENT AND PLAN     In brief, Arielle Felix is a 63 y.o. 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     Impression:  Left pleural effusion  SCL-ILD - radiographically stable, on monthly tocilizumab  HFpEF  Pulmonary Hypertension, group 1 due to SCL  Pericardial Effusion  ARDEN and LLL nodule - PET positive  Pleuritic chest pain - present for several months that was unresponsive to steroids and colchicine     Initial concern for chest pain 2/2 pleuritis, but on further discussion with patient, pleuritic chest pain has been chronic and has not responded to steroids. CT chest similar to previous diffuse groundglass opacity and fibrosis at left lung base expect for new left pleural effusion, so unlikely ILD flare.        Recommendations:  - Consult IR for thoracentesis of L pleural effusion to evaluate for malignancy, send fluid for protein, LDH, pH, glucose, cell count, culture, and cytology  - No indication for additional dose of toci, continue with regular schedule  - Will hold off on steroids at this time  - Diuresis prn  - C/w sildenafil and macitentan for pHTN  - Hold off on EBUS for now pending results of thora  - Incentive spirometer/mobilization/OOB to chair as tolerated  - Rest of management per primary team. Note is not complete until attending attestation is complete.         [place] ([facility] Lindsay[facility] Evans DO  Pulmonary & Critical Care Fellow, PGY-4  7/21/2024  8:03 AM  Pager: [phone]

## 2024-07-21 NOTE — PLAN OF CARE
Plan of Care Review  Plan of Care Reviewed With: patient  Progress: no change  Outcome Evaluation: AOx4. Stdby assist to bathroom. Pt states she in a 10/10 pain with no relief from PRN tylenol. MD notified. 2 lits of O2. Pt able to make all needs known. Call bell in reach

## 2024-07-21 NOTE — PROGRESS NOTES
Hospital Medicine     Daily Progress Note       SUBJECTIVE   Interval History: Patient seen and examined at bedside, she continues to report chest pain.   OBJECTIVE      Vital signs in last 24 hours:  Temp:  [36.5 °C (97.7 °F)-36.9 °C (98.4 °F)] 36.5 °C (97.7 °F)  Heart Rate:  [] 94  Resp:  [18-24] 18  BP: (104-127)/(59-68) 127/61    Intake/Output Summary (Last 24 hours) at 7/21/2024 1518  Last data filed at 7/21/2024 0800  Gross per 24 hour   Intake 200 ml   Output --   Net 200 ml       PHYSICAL EXAMINATION      Physical Exam  General:chronically ill looking  HEENT: Symmetric, PERRL/EOMI, moist membranes, NCAT  Cardiovascular: Regular rate and rhythm, normal S1/S2, no murmurs, rubs, gallops, no JVD  Pulmonary: Clear to auscultation bilaterally, no wheezing, rhonchi  GI: Soft, nontender, nondistended, bowel sounds normal, no organomegaly  Musculoskeletal: Normal bulk and tone, normal ROM  SKIN: scarring, hyper/hypopigmentation on face, scalp, upper and lower extremities, has missing digits in in bilateral hand fingers  Extremities: Distal pulses intact, No LE edema bilaterally  Neuro: CN2-12 intact, sensation intact, with no motor deficits  Psych: Normal mood and affect        LINES, CATHETERS, DRAINS, AIRWAYS, AND WOUNDS   Lines, Drains, and Airways:  Wounds (agree with documentation and present on admission):  Peripheral IV (Adult) 07/19/24 Anterior;Left Forearm (Active)   Number of days: 1       External Urinary Catheter Female (one size) (Active)   Number of days: 1         Comments:    LABS / IMAGING / TELE      Labs  Results from last 7 days   Lab Units 07/20/24  0414   WBC K/uL 7.27   HEMOGLOBIN g/dL 9.8*   HEMATOCRIT % 32.0*   PLATELETS K/uL 206          Imaging  Results from last 7 days   Lab Units 07/20/24  0414   WBC K/uL 7.27   HEMOGLOBIN g/dL 9.8*   HEMATOCRIT % 32.0*   PLATELETS K/uL 206      Results from last 7 days   Lab Units 07/20/24  0415   SODIUM mEQ/L 142   POTASSIUM mEQ/L 4.2    CHLORIDE mEQ/L 107   CO2 mEQ/L 25   BUN mg/dL 17   CREATININE mg/dL 1.2   GLUCOSE mg/dL 85   CALCIUM mg/dL 9.3        ASSESSMENT AND PLAN      Interstitial lung disease (CMS/HCC)  Assessment & Plan  -She has ongoing bouts of SOB and require 2L NC at baseline.  - Follows with pulmonary (Dr. Yovani Wasserman) and Cardiology (Dr. Arvizu). Recently completed course of colchicine and prednisone for pericarditis. -Current reigmen includes sildenafil and macitentan.   - Continue sildenafil and macitentan.   - She previously required treatment with steroids and tocilizumab, appreciate pulmonology input regarding need during this hospitalization     * Pulmonary edema, acute (CMS/HCC)  Assessment & Plan  Known history of chronic hypoxemic respiratory failure in setting of ILD from systemic sclerosis. CT scan concerning for new small left pleural effusion and suspected mild pulmonary edema. BNP elevated compared to prior. Favor volume oveload on the background of her known ILD as contributing to her cardiopulmonary complaints. Low concern for active infection despite air space opacities noted on CT. Given trial of IV diuresis in ED.  - Monitor clinical response to IV diuresis and provide further treatments accordingly  - Follow ESR and CRP to further elucidate inflammatory etiology   - Defer ABX for now  - IR for pleural effusion tap and studies    Pleural effusion  Assessment & Plan  -New small pleural effusion noted  - IR for thoracentensis  - Pls send for cytology as well as cell count to r/o malignant effusion  - If negative will need further work up of her pleural effusion       Multiple open wounds of lower extremity  Assessment & Plan  Continue local wound care as per home regimen     Acute chest pain  Assessment & Plan  Notes intermittent episodes of chest pain which has been ongoing, worse in last few days. History and workup thus far less suggestive of ACS, suspect MSK etiology as pain is reproducible on  palpation.  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 Currently on amlodipine 10mg in the morning and 10mg in the evening if she needs it. Also on sildenafil  - Continue home reigmen as prescribed           VTE Assessment: Padua    VTE Prophylaxis:  Current anticoagulants:    None      Code Status: Full Code      Estimated Discharge Date: 7/21/2024     Disposition Planning:Pending freddy Figueroa MD  7/21/2024

## 2024-07-21 NOTE — NURSING NOTE
Pt having pain with no relief from tylenol. One time dose of Morphine given per MD orders for 10/10 pain. Will monitor pt for reaction. Lidocaine patch placed on left flank. Call bell in reach. Bed alarm on. Pt able to make all needs known.

## 2024-07-22 ENCOUNTER — TELEPHONE (OUTPATIENT)
Dept: RADIOLOGY | Facility: HOSPITAL | Age: 64
End: 2024-07-22
Payer: COMMERCIAL

## 2024-07-22 ENCOUNTER — APPOINTMENT (INPATIENT)
Dept: RADIOLOGY | Facility: HOSPITAL | Age: 64
DRG: 291 | End: 2024-07-22
Attending: INTERNAL MEDICINE
Payer: COMMERCIAL

## 2024-07-22 ENCOUNTER — TELEPHONE (OUTPATIENT)
Dept: CARDIOLOGY | Facility: CLINIC | Age: 64
End: 2024-07-22
Payer: COMMERCIAL

## 2024-07-22 LAB
ERYTHROCYTE [SEDIMENTATION RATE] IN BLOOD BY WESTERGREN METHOD: >119 MM/HR
INR PPP: 1
PROTHROMBIN TIME: 13.1 SEC (ref 12.2–14.5)

## 2024-07-22 PROCEDURE — 76604 US EXAM CHEST: CPT

## 2024-07-22 PROCEDURE — 36415 COLL VENOUS BLD VENIPUNCTURE: CPT | Performed by: INTERNAL MEDICINE

## 2024-07-22 PROCEDURE — 63700000 HC SELF-ADMINISTRABLE DRUG: Performed by: STUDENT IN AN ORGANIZED HEALTH CARE EDUCATION/TRAINING PROGRAM

## 2024-07-22 PROCEDURE — 99233 SBSQ HOSP IP/OBS HIGH 50: CPT | Performed by: HOSPITALIST

## 2024-07-22 PROCEDURE — 85652 RBC SED RATE AUTOMATED: CPT | Performed by: INTERNAL MEDICINE

## 2024-07-22 PROCEDURE — 63700000 HC SELF-ADMINISTRABLE DRUG: Performed by: INTERNAL MEDICINE

## 2024-07-22 PROCEDURE — 63700000 HC SELF-ADMINISTRABLE DRUG: Performed by: HOSPITALIST

## 2024-07-22 PROCEDURE — 0W9B3ZZ DRAINAGE OF LEFT PLEURAL CAVITY, PERCUTANEOUS APPROACH: ICD-10-PCS | Performed by: STUDENT IN AN ORGANIZED HEALTH CARE EDUCATION/TRAINING PROGRAM

## 2024-07-22 PROCEDURE — 85610 PROTHROMBIN TIME: CPT | Performed by: PHYSICIAN ASSISTANT

## 2024-07-22 PROCEDURE — 21400000 HC ROOM AND CARE CCU/INTERMEDIATE

## 2024-07-22 RX ORDER — DIPHENHYDRAMINE HCL 25 MG
25 CAPSULE ORAL EVERY 4 HOURS PRN
Status: DISCONTINUED | OUTPATIENT
Start: 2024-07-22 | End: 2024-07-24 | Stop reason: HOSPADM

## 2024-07-22 RX ORDER — METOCLOPRAMIDE 10 MG/1
10 TABLET ORAL ONCE
Status: COMPLETED | OUTPATIENT
Start: 2024-07-22 | End: 2024-07-22

## 2024-07-22 RX ORDER — ONDANSETRON HYDROCHLORIDE 2 MG/ML
4 INJECTION, SOLUTION INTRAVENOUS ONCE
Status: COMPLETED | OUTPATIENT
Start: 2024-07-22 | End: 2024-07-22

## 2024-07-22 RX ORDER — ONDANSETRON 4 MG/1
4 TABLET, ORALLY DISINTEGRATING ORAL ONCE
Status: COMPLETED | OUTPATIENT
Start: 2024-07-22 | End: 2024-07-22

## 2024-07-22 RX ADMIN — LIDOCAINE 4% 1 PATCH: 40 PATCH TOPICAL at 08:52

## 2024-07-22 RX ADMIN — METOCLOPRAMIDE 10 MG: 10 TABLET ORAL at 16:49

## 2024-07-22 RX ADMIN — LIDOCAINE AND PRILOCAINE: 25; 25 CREAM TOPICAL at 10:15

## 2024-07-22 RX ADMIN — SILDENAFIL 20 MG: 20 TABLET, FILM COATED ORAL at 13:20

## 2024-07-22 RX ADMIN — SILDENAFIL 20 MG: 20 TABLET, FILM COATED ORAL at 08:53

## 2024-07-22 RX ADMIN — SILDENAFIL 20 MG: 20 TABLET, FILM COATED ORAL at 19:45

## 2024-07-22 RX ADMIN — AMLODIPINE BESYLATE 10 MG: 10 TABLET ORAL at 08:52

## 2024-07-22 RX ADMIN — PANTOPRAZOLE SODIUM 20 MG: 20 TABLET, DELAYED RELEASE ORAL at 08:53

## 2024-07-22 RX ADMIN — MUPIROCIN 1 APPLICATION: 20 OINTMENT TOPICAL at 10:16

## 2024-07-22 RX ADMIN — DIPHENHYDRAMINE HYDROCHLORIDE 25 MG: 25 CAPSULE ORAL at 16:49

## 2024-07-22 RX ADMIN — Medication 1000 UNITS: at 08:52

## 2024-07-22 RX ADMIN — ATORVASTATIN CALCIUM 40 MG: 40 TABLET, FILM COATED ORAL at 16:49

## 2024-07-22 RX ADMIN — SILDENAFIL 20 MG: 20 TABLET, FILM COATED ORAL at 01:26

## 2024-07-22 RX ADMIN — DIPHENHYDRAMINE HYDROCHLORIDE 25 MG: 25 CAPSULE ORAL at 10:22

## 2024-07-22 RX ADMIN — SALINE NASAL SPRAY 2 SPRAY: 1.5 SOLUTION NASAL at 10:14

## 2024-07-22 RX ADMIN — ACETAMINOPHEN 975 MG: 325 TABLET ORAL at 12:38

## 2024-07-22 RX ADMIN — ONDANSETRON 4 MG: 4 TABLET, ORALLY DISINTEGRATING ORAL at 15:53

## 2024-07-22 RX ADMIN — LEVOTHYROXINE SODIUM 100 MCG: 0.1 TABLET ORAL at 06:12

## 2024-07-22 ASSESSMENT — COGNITIVE AND FUNCTIONAL STATUS - GENERAL
STANDING UP FROM CHAIR USING ARMS: 3 - A LITTLE
CLIMB 3 TO 5 STEPS WITH RAILING: 3 - A LITTLE
MOVING TO AND FROM BED TO CHAIR: 3 - A LITTLE
WALKING IN HOSPITAL ROOM: 3 - A LITTLE
STANDING UP FROM CHAIR USING ARMS: 3 - A LITTLE
WALKING IN HOSPITAL ROOM: 3 - A LITTLE
CLIMB 3 TO 5 STEPS WITH RAILING: 3 - A LITTLE
MOVING TO AND FROM BED TO CHAIR: 3 - A LITTLE

## 2024-07-22 NOTE — PLAN OF CARE
Per medical rounds, Pt is medically stable for d/c Thursday. SW attempted to speak with Pt to complete admission assessment, but she requested SW follow up as she was not feeling well. SW to follow up at a later time.

## 2024-07-22 NOTE — OR SURGEON
Pre-Procedure patient identification:  I am the primary operating surgeon/proceduralist and I have reviewed the applicable pathology reports and radiology studies for this procedure. I have identified the patient on 07/22/24 at 1:32 PM RAS Linda

## 2024-07-22 NOTE — POST-PROCEDURE NOTE
Interventional Radiology Brief Postprocedure Note    Arielle GUAJARDO Isidro     Attending: RAS Linda / Russ Rodriguez MD    Assistant: Jack    Diagnosis: pleural effusion    Description of procedure: US guided L thora    Contrast: none     Anesthesia:  None    Volume of Lidocaine Utilized (ml): 0     Medications: none     Complications: None      Estimated Blood Loss: Estimated Blood Loss: None    Anticoagulation: n/a    Specimens: n/a    Findings: Limited bedside L chest US shows tiny pleural effusion with no safe window to allow for thoracentesis. Procedure deferred. Team updated.     7/22/2024 2:01 PM

## 2024-07-22 NOTE — TELEPHONE ENCOUNTER
Called to speak to Ms. Felix regarding her echocardiogram however there was no answer and no option to leave VM. Will attempt later.

## 2024-07-22 NOTE — PROGRESS NOTES
Hospital Medicine     Daily Progress Note       SUBJECTIVE   Interval History: Patient seen and examined at bedside    Discusses events of last night  - RRT with concern for reaction to morphine - emesis primary symptom   Pt reports syncope subjectively  - not documented per RN/physician overnight documentation    Benadryl given, though unclear allergy per se, CT H checked (no remarkable acute findings)  Now reporting warm, swelling of face  -endsores left side discomfort without worsening today    Reports ongoing palpitations as well (observed mild tachycardia on tele, regular)    RN in room, discussed cooling cloth on face for comfort PRN, benadryl trial again     OBJECTIVE      Vital signs in last 24 hours:  Temp:  [36.4 °C (97.6 °F)-36.9 °C (98.5 °F)] 36.9 °C (98.5 °F)  Heart Rate:  [] 94  Resp:  [14-18] 14  BP: (107-146)/(53-71) 146/70    Intake/Output Summary (Last 24 hours) at 7/22/2024 1442  Last data filed at 7/22/2024 0800  Gross per 24 hour   Intake 242 ml   Output --   Net 242 ml       PHYSICAL EXAMINATION      Physical Exam  General:chronically ill looking, skin tightening, some warmth and tight skin around face  HEENT: Symmetric, PERRL/EOMI, moist membranes, NCAT  Cardiovascular: Regular rate and rhythm, normal S1/S2, no murmurs, rubs, gallops, no JVD  Pulmonary: Clear to auscultation bilaterally, no wheezing, rhonchi  GI: Soft, nontender, nondistended, bowel sounds normal, no organomegaly  Musculoskeletal: Normal bulk and tone, normal ROM  SKIN: scarring, hyper/hypopigmentation on face, scalp, upper and lower extremities, has missing digits in in bilateral hand fingers  Extremities: Distal pulses intact, No LE edema bilaterally  Neuro: CN2-12 intact, sensation intact, with no motor deficits  Psych: Normal mood and affect        LINES, CATHETERS, DRAINS, AIRWAYS, AND WOUNDS   Lines, Drains, and Airways:  Wounds (agree with documentation and present on admission):  Peripheral IV (Adult)  07/19/24 Anterior;Left Forearm (Active)   Number of days: 1       External Urinary Catheter Female (one size) (Active)   Number of days: 1         LABS / IMAGING / TELE      Labs reviewed    ASSESSMENT AND PLAN   Arielle Felix is a 63 y.o. female with a past medical history of pHTN, HTN, Raynaud's Disease, Cutaneous Systemic Scleroderma (dx 1998), ILD, PE (3/2023) here with concern for palpitations.   Pulmonlogy consulting.    She had an episode of ?reaction to morphine overnight 7/21-22, primarily characterized by emesis/ dyspnea noted and ?warmth of face/skin tightening, morphine as allergy vs contraindication is in MAR.   Pleural effusion  Assessment & Plan  -New small pleural effusion noted  - IR for thoracentensis  - Pls send for cytology as well as cell count to r/o malignant effusion  - If negative will need further work up of her pleural effusion       Multiple open wounds of lower extremity  Assessment & Plan  Continue local wound care as per home regimen     Chest pain, unspecified type  Assessment & Plan  Without worsening, supportive care     Acute chest pain  Assessment & Plan  Notes intermittent episodes of chest pain which has been ongoing, worse in last few days. History and workup thus far less suggestive of ACS, suspect MSK etiology as pain is reproducible on palpation.  - Maintain on supportive treatment  -endsores left side discomfort without worsening today  Appreciate pulm recs:  IR thoracentesis recommended with cyto (however, not enough fluid, could not do - US completed); will need eventual ION with EBUS for biopsy  Hold off on further steroids (did not respond in the past)  cont sildenafil and macitentan for pHTN per Dr Arvizu--No indication for additional dose of toci, continue with regular schedule  Diuresis prn, IS/Mobilize/OOB chair     Interstitial lung disease (CMS/HCC)  Assessment & Plan  -She has ongoing bouts of SOB and require 2L NC at baseline.  - Follows with pulmonary  (Dr. Yovani Wasserman) and Cardiology (Dr. Arvizu). Recently completed course of colchicine and prednisone for pericarditis. -Current reigmen includes sildenafil and macitentan.   - Continue sildenafil and macitentan.   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 Currently on amlodipine 10mg in the morning and 10mg in the evening if she needs it. Also on sildenafil  - Continue home reigmen as prescribed      * Pulmonary edema, acute (CMS/HCC)  Assessment & Plan  Known history of chronic hypoxemic respiratory failure in setting of ILD from systemic sclerosis. CT scan concerning for new small left pleural effusion and suspected mild pulmonary edema. BNP elevated compared to prior. Favor volume oveload on the background of her known ILD as contributing to her cardiopulmonary complaints. Low concern for active infection despite air space opacities noted on CT. Given trial of IV diuresis in ED.  - Monitor clinical response to IV diuresis and provide further treatments accordingly  - Follow ESR and CRP to further elucidate inflammatory etiology   - Defer ABX for now  - IR for pleural effusion tap and studies - COULD NOT GET ENOUGH FLUID, further w/u per Pulm consideration         VTE Assessment: Padua    VTE Prophylaxis:  Current anticoagulants:    None      Code Status: Full Code      Estimated Discharge Date: 7/21/2024       Disposition Planning:Pending pulm cece Ortez, DO  7/22/2024     50 minutes were spent with patient and or family members collecting an (interval) history, performing a pertinent physical exam and coordinating care and decision making with other healthcare providers and specialists in this  complex case.     *Addendum - moderate to severe malnutrition, cannot state, Body mass index is 17.61 kg/m².

## 2024-07-22 NOTE — PLAN OF CARE
Plan of Care Review  Plan of Care Reviewed With: patient  Progress: no change  Outcome Evaluation: RR was called on pt today. Pt had allergic reaction to morphine. Pt had stat CT done. pt was given bendrayl and zofran. Pt VSS no c/o of pain

## 2024-07-22 NOTE — SIGNIFICANT EVENT
Internal Medicine  RRT or Code Blue Note       SUMMARY OF EVENT   This is a 63 y.o. year-old female who was admitted on 7/19/2024 with  Shortness of breath [R06.02]  Pericardial effusion [I31.39]  Pleural effusion [J90]  Pulmonary edema, acute (CMS/HCC) [J81.0]  Chest pain, unspecified type [R07.9] and is currently being treated for Pulmonary edema, acute (CMS/HCC).      We were called to the room by overhead page for concerns of an allergic reaction.  Patient was complaining of 10/10 headache earlier this evening and she received morphine for pain. Upon arrival to the room, patient reported not feeling well. She complained of her severe headache and shortness of breath. She was nauseated and vomiting once (non bloody, non bilious). She was also reporting dizziness. No new chest pain.  She was satting 89% on RA; started on 2L/min NC and SpO2 picked up to 96%. No audible wheezing or allergic rash. No dysphonia or reported dysphagia.  Patient is emotional distress due to her medical condition.    Plan:  - Give Benadryl 25mg IV, Solumedrol 40mg IV and Zofran 4mg IV  - CT head without contrast in the setting of worsening/severe headache     CODE STATUS      Their Code Status at the start of the event was Full Code      CODE LEADER   Marci Flanagan MD       PERTINENT PHYSICAL EXAMINATION      Pre-Event Vital Signs: Temp:  [36.4 °C (97.6 °F)-36.9 °C (98.4 °F)] 36.4 °C (97.6 °F)  Heart Rate:  [] 99  Resp:  [18-22] 18  BP: (104-130)/(53-68) 130/60     Physical Exam    General Appearance: AAOx3, flushed face, no new rash  Cardiovascular: Tachycardic and regular rhythm. Normal S1S2, no murmurs  Pulmonary:  Clear to ausculation bilaterally, No wheezing  Abdominal:  BS present. Soft, Non tender to palpation.   Extremities:  No lower extremity edema. Pulses present. Multiple amputated fingers     SIGNIFICANT LABS / IMAGING      Labs  No new labs.    Imaging  Not applicable    ECG/Telemetry  I have independently  reviewed the telemetry. Significant findings include Sinus tachycardia.    POST-EVENT      Diagnosis: Allergic reaction; headache    Patient condition at end of event: Stable    Disposition: No change; 2Ros       KEY COMMUNICATION      Discussed with Attending (Corinne Ortez DO) or Covering Attending (Dr. Castañeda): Yes/No: yes    Discussed with Emergency Contact: Extended Emergency Contact Information  Primary Emergency Contact: mauricio french  Mobile Phone: [phone]  Relation: Daughter  Secondary Emergency Contact: Wagner Khan  Mobile Phone: [phone]  Relation: Daughter: Yes/No: yes    Follow-Up/Handoff:  - F/u CT head  - Can trial dilaudid if persistent headache       ATTENDING DOCUMENTATION  ALSO SEE ATTENDING ATTESTATION SECTION OF NOTE

## 2024-07-22 NOTE — PROGRESS NOTES
"     Pulmonary  Progress Note       SUBJECTIVE     RRT called overnight for concern for allergic reaction to morphine. She was given benadryl, methylpred and zofran. CT head was obtained and negative for acute processes. Pt continues to have pleuritic chest pain, but otherwise feeling well.     OBJECTIVE     Vital Signs:     Temp (24hrs), Av.6 °C (97.9 °F), Min:36.4 °C (97.6 °F), Max:36.9 °C (98.5 °F)      Visit Vitals  BP (!) 146/70   Pulse 94   Temp 36.9 °C (98.5 °F) (Oral)   Resp 14   Ht 1.676 m (5' 6\")   Wt 49.5 kg (109 lb 2 oz)   LMP  (LMP Unknown)   SpO2 98%   BMI 17.61 kg/m²     Vitals:    24 0547 24 0803 24 1103 24 1341   BP:  137/63 131/60 (!) 146/70   BP Location:  Right upper arm Right upper arm    Patient Position:  Lying Lying    Pulse: 79 89 94    Resp:   14    Temp:  36.4 °C (97.6 °F) 36.9 °C (98.5 °F)    TempSrc:  Oral Oral    SpO2:  97% 98%    Weight:       Height:             I/O's:    Intake/Output Summary (Last 24 hours) at 2024 1356  Last data filed at 2024 0800  Gross per 24 hour   Intake 242 ml   Output --   Net 242 ml         Medications:     amLODIPine  10 mg oral q AM    atorvastatin  40 mg oral Daily (6p)    cholecalciferol (vitamin D3)  1,000 Units oral Daily    levothyroxine  100 mcg oral Daily (6:30a)    lidocaine  1 patch Topical Daily    lidocaine-prilocaine   Topical BID (8a, 8p)    macitentan  10 mg oral Daily    mupirocin  1 Application Topical Daily    pantoprazole  20 mg oral Daily    sildenafiL (pulm.hypertension)  20 mg oral TID    sodium chloride  2 spray Each Nostril Daily       Anti-infectives (From admission, onward)      Start     Dose/Rate Route Frequency Ordered Stop    24 0900  mupirocin (BACTROBAN) 2 % ointment 1 Application         1 Application Topical Daily 24 8574                 Physical Exam     Physical Exam:   General: NAD, resting comfortably in bed  Cardiac: RRR, no murmurs, rubs and gallops  Respiratory: " Crackles at LL base, normal effort  Abdominal: soft, NT  MSK: No LLE erythema, swelling or edema, sclerodactyly   Neuro: AAOx3       Diagnostic Data     Labs:  I have personally reviewed all pertinent patient laboratory results. Labs of note discussed below:    CBC Results         07/20/24 07/19/24 06/22/24     0414 1432 1346    WBC 7.27 9.78 9.83    RBC 3.98 4.46 3.95    HGB 9.8 10.9 9.9    HCT 32.0 35.9 34.0    MCV 80.4 80.5 86.1    MCH 24.6 24.4 25.1    MCHC 30.6 30.4 29.1     212 359          BMP Results         07/20/24 07/19/24 06/22/24     0415 1432 1346     143 141    K 4.2 4.0 3.8    Cl 107 110 109    CO2 25 23 20    Glucose 85 93 140    BUN 17 15 14    Creatinine 1.2 1.0 1.0    Calcium 9.3 9.7 9.5    Anion Gap 10 10 12    EGFR 51.0 >60.0 >60.0           Comment for K at 1432 on 07/19/24: Results obtained on plasma. Plasma Potassium values may be up to 0.4 mEQ/L less than serum values. The differences may be greater for patients with high platelet or white cell counts.    Comment for K at 1346 on 06/22/24: Results obtained on plasma. Plasma Potassium values may be up to 0.4 mEQ/L less than serum values. The differences may be greater for patients with high platelet or white cell counts.    Comment for EGFR at 0415 on 07/20/24: Calculation based on the Chronic Kidney Disease Epidemiology Collaboration (CKD-EPI) equation refit without adjustment for race.    Comment for EGFR at 1432 on 07/19/24: Calculation based on the Chronic Kidney Disease Epidemiology Collaboration (CKD-EPI) equation refit without adjustment for race.    Comment for EGFR at 1346 on 06/22/24: Calculation based on the Chronic Kidney Disease Epidemiology Collaboration (CKD-EPI) equation refit without adjustment for race.          Microbiology Results       ** No results found for the last 720 hours. **          ABG Results         04/13/23     1949    Source Of Oxygen nc              Imaging:    I have reviewed all pertinent  imaging.    CT HEAD WITHOUT IV CONTRAST    Result Date: 7/21/2024  CLINICAL HISTORY:   Headache, sudden, severe COMPARISON: Brain MRI from 2009 TECHNIQUE:  Contiguous axial images are acquired through the brain from the base to vertex without administration of intravenous contrast.  The kV and/or mA were adjusted according to the patient's size and body part for CT dose reduction. COMMENT: Brain: Mild microangiopathic changes. Ventricles: There is mild diffuse cerebral volume loss with proportionate prominence of the ventricular system.  No hydrocephalus. Hemorrhage: There is no evidence of acute intracranial hemorrhage. Mass/Edema: No mass effect or midline shift. Gray/White: Gray white differentiation is grossly preserved. Bones: No acute displaced fracture. Atherosclerosis: Intracranial vascular calcifications are present. Sinuses: Partial opacification of the paranasal sinuses Mastoids: No significant opacification. Soft Tissues: within normal limits     IMPRESSION: 1.  No evidence of acute territorial infarction, acute intracranial hemorrhage, or mass effect. 2.  Partial opacification of the paranasal sinuses which may be secondary to sinusitis.        ASSESSMENT AND PLAN     In brief, Arielle Felix is a 63 y.o. female with Cutaneous Systemic Scleroderma (dx 1998) c/b ILD and Group 1 Pulmonary HTN, HFpEF, HTN, Raynaud's Disease, protein calorie malnutrition, hx OF PE  presenting from outpt TTE for palpitations.     Impression:  Left pleural effusion  SCL-ILD - radiographically stable, on monthly tocilizumab  HFpEF  Pulmonary Hypertension, group 1 due to SCL  Pericardial Effusion  ARDEN and LLL nodule - PET positive  Pleuritic chest pain - present for several months that was unresponsive to steroids and colchicine     Initial concern for chest pain 2/2 pleuritis, but on further discussion with patient, pleuritic chest pain has been chronic and has not responded to steroids. CT chest similar to previous  diffuse groundglass opacity and fibrosis at left lung base except for new left pleural effusion, so unlikely ILD flare. Unfortunately, not enough fluid to safely drain for thora.        Recommendations:  - No indication for additional dose of toci, continue with regular schedule  - Will hold off on steroids at this time  - Diuresis prn  - C/w sildenafil and macitentan for pHTN  - Since unable to perform thora, will need eventual ION with EBUS for biopsy  - Incentive spirometer/mobilization/OOB to chair as tolerated  - Rest of management per primary team. Note is not complete until attending attestation is complete.         [place] [place]  Pulmonary & Critical Care Fellow, PGY-4  7/22/2024  1:56 PM  Pager: [phone]

## 2024-07-22 NOTE — HOSPITAL COURSE
Dear Arielle Felix,      You presented to the hospital with complaints of   Chief Complaint   Patient presents with    Chest Pain   . You were found to have [unfilled] . This means that ***.     Important Medications adjustments, if made, are listed above.     Please do the following for the management of your heart failure:  You had heart failure during your admission so it is important to follow the following instructions  --Weigh yourself everyday in the morning after you urinate and before you eat breakfast. Keep a log. If you gain more than 2-3 lbs in 1 day or 5lbs in 1 week, call your cardiologist as your diuretic may need to be revised  -Follow a heart healthy low sodium diet (<2000 mg sodium/day), low fluid (< 2L in a day). Avoid/limit canned, packaged, processed and fast foods. Read food labels and choose lower sodium options. Don't add salt to your food.   --Take your medications exactly as prescribed  --Follow up with Cardiology within 1-2 weeks, call the number found on your instructions to schedule an appointment.   --Your new dry weight is *** lbs.  Weigh yourself every day. A sudden weight gain can mean your heart failure is getting worse. If your weight goes up by more than 2 pounds in 1 day, 5 pounds in 1 week please contact your PCP or cardiologist. This is a sign that you are retaining more fluid than you should be. Clues to weight gain include increased shortness of breath, more swelling in your legs, ankles, feet or belly, or shortness of breath lying down (needing to sleep with extra pillows or up in a chair).    --Take your medication exactly as prescribed to keep your heart working at its best! If you have any questions or concerns regarding your medication, contact your doctor.  --Keep active. Ask your health care provider if Outpatient Cardiac Rehabilitation or the Cardiac Wellness Program is right for you.       ---Please follow up with PCP within 1-2 weeks to discuss this  hospitalization and for continued care, call to schedule an appointment.  ---Also Follow up with Cardiologist  ***     [place] [facility] [place] you the best of health moving forward.            Impression:  Left pleural effusion  SCL-ILD - radiographically stable, on monthly tocilizumab  HFpEF  Pulmonary Hypertension, group 1 due to SCL  Pericardial Effusion  ARDEN and LLL nodule - PET positive  Pleuritic chest pain - present for several months that was unresponsive to steroids and colchicine     Initial concern for chest pain 2/2 pleuritis, but on further discussion with patient, pleuritic chest pain has been chronic and has not responded to steroids. CT chest similar to previous diffuse groundglass opacity and fibrosis at left lung base except for new left pleural effusion, so unlikely ILD flare. Unfortunately, not enough fluid to safely drain for thora.        Recommendations:  - Outpt toci for ILD  -consideration for rilonacept for recurrent pericarditis, would defer to Dr. Trevizo  - Will hold off on steroids at this time  - Diuresis prn  - C/w sildenafil and macitentan for pHTN  - Since unable to perform thora, will need reschedule of ION. I explained to her that this will require rescheduling and will not be done during hospitalization.  - Incentive spirometer/mobilization/OOB to chair as tolerated

## 2024-07-23 VITALS
WEIGHT: 109.13 LBS | HEIGHT: 66 IN | TEMPERATURE: 98 F | SYSTOLIC BLOOD PRESSURE: 101 MMHG | HEART RATE: 88 BPM | RESPIRATION RATE: 18 BRPM | DIASTOLIC BLOOD PRESSURE: 57 MMHG | OXYGEN SATURATION: 99 % | BODY MASS INDEX: 17.54 KG/M2

## 2024-07-23 PROCEDURE — 63700000 HC SELF-ADMINISTRABLE DRUG: Performed by: INTERNAL MEDICINE

## 2024-07-23 PROCEDURE — 99239 HOSP IP/OBS DSCHRG MGMT >30: CPT | Performed by: HOSPITALIST

## 2024-07-23 PROCEDURE — 63700000 HC SELF-ADMINISTRABLE DRUG: Performed by: STUDENT IN AN ORGANIZED HEALTH CARE EDUCATION/TRAINING PROGRAM

## 2024-07-23 PROCEDURE — 63700000 HC SELF-ADMINISTRABLE DRUG: Performed by: HOSPITALIST

## 2024-07-23 RX ORDER — ACETAMINOPHEN 325 MG/1
975 TABLET ORAL EVERY 6 HOURS
Status: DISCONTINUED | OUTPATIENT
Start: 2024-07-23 | End: 2024-07-24 | Stop reason: HOSPADM

## 2024-07-23 RX ORDER — FUROSEMIDE 20 MG/1
20 TABLET ORAL DAILY PRN
Qty: 20 TABLET | Refills: 0 | Status: SHIPPED | OUTPATIENT
Start: 2024-07-23 | End: 2024-07-24

## 2024-07-23 RX ORDER — MACITENTAN 10 MG/1
10 TABLET, FILM COATED ORAL DAILY
Qty: 30 TABLET | Refills: 11 | Status: SHIPPED | OUTPATIENT
Start: 2024-07-23 | End: 2024-07-23

## 2024-07-23 RX ORDER — MACITENTAN 10 MG/1
10 TABLET, FILM COATED ORAL DAILY
Qty: 30 TABLET | Refills: 0 | Status: SHIPPED | OUTPATIENT
Start: 2024-07-23 | End: 2024-11-19

## 2024-07-23 RX ADMIN — DIPHENHYDRAMINE HYDROCHLORIDE 25 MG: 25 CAPSULE ORAL at 13:12

## 2024-07-23 RX ADMIN — LEVOTHYROXINE SODIUM 100 MCG: 0.1 TABLET ORAL at 05:06

## 2024-07-23 RX ADMIN — LIDOCAINE 4% 1 PATCH: 40 PATCH TOPICAL at 10:10

## 2024-07-23 RX ADMIN — SALINE NASAL SPRAY 2 SPRAY: 1.5 SOLUTION NASAL at 10:10

## 2024-07-23 RX ADMIN — SILDENAFIL 20 MG: 20 TABLET, FILM COATED ORAL at 10:10

## 2024-07-23 RX ADMIN — AMLODIPINE BESYLATE 10 MG: 10 TABLET ORAL at 10:10

## 2024-07-23 RX ADMIN — PANTOPRAZOLE SODIUM 20 MG: 20 TABLET, DELAYED RELEASE ORAL at 10:10

## 2024-07-23 RX ADMIN — LIDOCAINE AND PRILOCAINE: 25; 25 CREAM TOPICAL at 10:10

## 2024-07-23 RX ADMIN — ACETAMINOPHEN 975 MG: 325 TABLET ORAL at 13:12

## 2024-07-23 RX ADMIN — ACETAMINOPHEN 975 MG: 325 TABLET ORAL at 02:27

## 2024-07-23 RX ADMIN — Medication 1000 UNITS: at 10:14

## 2024-07-23 RX ADMIN — SILDENAFIL 20 MG: 20 TABLET, FILM COATED ORAL at 13:12

## 2024-07-23 RX ADMIN — MUPIROCIN 1 APPLICATION: 20 OINTMENT TOPICAL at 10:10

## 2024-07-23 ASSESSMENT — COGNITIVE AND FUNCTIONAL STATUS - GENERAL
STANDING UP FROM CHAIR USING ARMS: 4 - NONE
WALKING IN HOSPITAL ROOM: 4 - NONE
MOVING TO AND FROM BED TO CHAIR: 4 - NONE
CLIMB 3 TO 5 STEPS WITH RAILING: 4 - NONE

## 2024-07-23 NOTE — TELEPHONE ENCOUNTER
Refill request received from     Last appt w/ Dr. Arvizu 6/20/24    Upcoming appt on 10/10/24    Refill approved after chart review.

## 2024-07-23 NOTE — PROGRESS NOTES
"     Pulmonary  Progress Note       SUBJECTIVE     Still with pain. Wants her bronch, I explained this will need to be rescheduled.     OBJECTIVE     Vital Signs:     Temp (24hrs), Av.8 °C (98.2 °F), Min:36.4 °C (97.5 °F), Max:37.1 °C (98.8 °F)      Visit Vitals  BP (!) 128/59 (BP Location: Right upper arm, Patient Position: Lying)   Pulse 99   Temp 36.5 °C (97.7 °F) (Oral)   Resp 18   Ht 1.676 m (5' 6\")   Wt 49.5 kg (109 lb 2 oz)   LMP  (LMP Unknown)   SpO2 98%   BMI 17.61 kg/m²     Vitals:    24 0315 24 0753 24 1010 24 1129   BP:  130/61 (!) 120/56 (!) 128/59   BP Location:  Left upper arm  Right upper arm   Patient Position:  Lying  Lying   Pulse: 84 72 82 99   Resp:  18  18   Temp:  36.4 °C (97.5 °F)  36.5 °C (97.7 °F)   TempSrc:  Oral  Oral   SpO2:  98%  98%   Weight:       Height:             I/O's:    Intake/Output Summary (Last 24 hours) at 2024 1259  Last data filed at 2024 0800  Gross per 24 hour   Intake 242 ml   Output --   Net 242 ml         Medications:     acetaminophen  975 mg oral q6h SPENCER    amLODIPine  10 mg oral q AM    atorvastatin  40 mg oral Daily (6p)    cholecalciferol (vitamin D3)  1,000 Units oral Daily    levothyroxine  100 mcg oral Daily (6:30a)    lidocaine  1 patch Topical Daily    lidocaine-prilocaine   Topical BID (8a, 8p)    macitentan  10 mg oral Daily    mupirocin  1 Application Topical Daily    pantoprazole  20 mg oral Daily    sildenafiL (pulm.hypertension)  20 mg oral TID    sodium chloride  2 spray Each Nostril Daily       Anti-infectives (From admission, onward)      Start     Dose/Rate Route Frequency Ordered Stop    24 0900  mupirocin (BACTROBAN) 2 % ointment 1 Application         1 Application Topical Daily 24 5284                 Physical Exam     Physical Exam:   General: NAD, resting comfortably in bed  Cardiac: RRR, no murmurs, rubs and gallops  Respiratory: Crackles at bases  Abdominal: soft, NT  MSK: No LLE erythema, " Patient Specific Counseling (Will Not Stick From Patient To Patient): X\\n\\nExcised spot on right shin, BG advises that path report may be indicative of a need for further action(despite clear margins). Path report reads perineurial involvement, which could mean a higher likelihood for SCC to recur or spread, even though margins were clear on path report. BG counsels with SD regarding pathology, SD and BG agree that monitoring is the best course of action. BG notes no radiation necessary. Pt requests to come back in a year, BG strongly encourages a 2 month fu. Pt agrees to 2 month fu. Tx plan is to monitor at 2 month fu. Picture was taken today. Detail Level: Detailed swelling or edema, sclerodactyly with autoamp fingers  Neuro: AAOx3  Skin tightening across her face       Diagnostic Data     Labs:  I have personally reviewed all pertinent patient laboratory results. Labs of note discussed below:    CBC Results         07/20/24 07/19/24 06/22/24     0414 1432 1346    WBC 7.27 9.78 9.83    RBC 3.98 4.46 3.95    HGB 9.8 10.9 9.9    HCT 32.0 35.9 34.0    MCV 80.4 80.5 86.1    MCH 24.6 24.4 25.1    MCHC 30.6 30.4 29.1     212 359          BMP Results         07/20/24 07/19/24 06/22/24     0415 1432 1346     143 141    K 4.2 4.0 3.8    Cl 107 110 109    CO2 25 23 20    Glucose 85 93 140    BUN 17 15 14    Creatinine 1.2 1.0 1.0    Calcium 9.3 9.7 9.5    Anion Gap 10 10 12    EGFR 51.0 >60.0 >60.0           Comment for K at 1432 on 07/19/24: Results obtained on plasma. Plasma Potassium values may be up to 0.4 mEQ/L less than serum values. The differences may be greater for patients with high platelet or white cell counts.    Comment for K at 1346 on 06/22/24: Results obtained on plasma. Plasma Potassium values may be up to 0.4 mEQ/L less than serum values. The differences may be greater for patients with high platelet or white cell counts.    Comment for EGFR at 0415 on 07/20/24: Calculation based on the Chronic Kidney Disease Epidemiology Collaboration (CKD-EPI) equation refit without adjustment for race.    Comment for EGFR at 1432 on 07/19/24: Calculation based on the Chronic Kidney Disease Epidemiology Collaboration (CKD-EPI) equation refit without adjustment for race.    Comment for EGFR at 1346 on 06/22/24: Calculation based on the Chronic Kidney Disease Epidemiology Collaboration (CKD-EPI) equation refit without adjustment for race.          Microbiology Results       ** No results found for the last 720 hours. **          ABG Results         04/13/23 1949    Source Of Oxygen nc              Imaging:    I have reviewed all pertinent imaging.    No results  found.       ASSESSMENT AND PLAN     In brief, Arielle Felix is a 63 y.o. female with Cutaneous Systemic Scleroderma (dx 1998) c/b ILD and Group 1 Pulmonary HTN, HFpEF, HTN, Raynaud's Disease, protein calorie malnutrition, hx OF PE  presenting from outpt TTE for palpitations.     Impression:  Left pleural effusion  SCL-ILD - radiographically stable, on monthly tocilizumab  HFpEF  Pulmonary Hypertension, group 1 due to SCL  Pericardial Effusion  ARDEN and LLL nodule - PET positive  Pleuritic chest pain - present for several months that was unresponsive to steroids and colchicine     Initial concern for chest pain 2/2 pleuritis, but on further discussion with patient, pleuritic chest pain has been chronic and has not responded to steroids. CT chest similar to previous diffuse groundglass opacity and fibrosis at left lung base except for new left pleural effusion, so unlikely ILD flare. Unfortunately, not enough fluid to safely drain for thora.        Recommendations:  - Outpt toci for ILD  -consideration for rilonacept for recurrent pericarditis, would defer to Dr. Trevizo  - Will hold off on steroids at this time  - Diuresis prn  - C/w sildenafil and macitentan for pHTN  - Since unable to perform thora, will need reschedule of ION. I explained to her that this will require rescheduling and will not be done during hospitalization.  - Incentive spirometer/mobilization/OOB to chair as tolerated    Kat Alvarado MD

## 2024-07-23 NOTE — PLAN OF CARE
Plan of Care Review  Plan of Care Reviewed With: patient  Progress: improving  Outcome Evaluation: Pt VSS. Pt /o of nausea and chest

## 2024-07-23 NOTE — DISCHARGE INSTRUCTIONS
Dear Arielle Felix,      You presented to the hospital with complaints of   Chief Complaint   Patient presents with    Chest Pain       You were found to have Pleuritic chest pain which has been chronic and not responding to steroids without changes on imaging to raise concern for a flare of your Interstitial Lung disease. You also had some fluid around base of your left lung but not enough to drain and collect for analysis - we discussed this with interventional radiologist team. You will need to get ION biopsy scheduling on an outpatient basis - this cannot be done in the hospital .    You will need to continue tocilizumab outpatient. You can discuss rilonacept for recurrent pericarditis, deferring to Dr Trevizo on this question.     You will need diuresis (fluid removal) on an as needed basis. Lasix pill is sent to your pharmacy. Please see instructions for when to take this below.     You will need to continue sildenafil and macitentan for pulmonary hypertension.      Dear Arielle Felix,      You presented to the hospital with complaints of   Chief Complaint   Patient presents with    Chest Pain     Please do the following for the management of your heart failure:  You had heart failure during your admission so it is important to follow the following instructions  --Weigh yourself everyday in the morning after you urinate and before you eat breakfast. Keep a log. If you gain more than 2-3 lbs in 1 day or 5lbs in 1 week, call your cardiologist as your diuretic may need to be revised  -Follow a heart healthy low sodium diet (<2000 mg sodium/day), low fluid (< 2L in a day). Avoid/limit canned, packaged, processed and fast foods. Read food labels and choose lower sodium options. Don't add salt to your food.   --Take your medications exactly as prescribed  --Follow up with Cardiology within 1-2 weeks, call the number found on your instructions to schedule an appointment.   --Your new dry weight is 109  "lbs.  Weigh yourself every day. A sudden weight gain can mean your heart failure is getting worse. If your weight goes up by more than 2 pounds in 1 day, 5 pounds in 1 week please contact your PCP or cardiologist. You will also need to take lasix \"as needed\" pill as prescribed if this happens because this is a sign that you are retaining more fluid than you should be. Clues to weight gain include increased shortness of breath, more swelling in your legs, ankles, feet or belly, or shortness of breath lying down (needing to sleep with extra pillows or up in a chair).    --Take your medication exactly as prescribed to keep your heart working at its best! If you have any questions or concerns regarding your medication, contact your doctor.  --Keep active. Ask your health care provider if Outpatient Cardiac Rehabilitation or the Cardiac Wellness Program is right for you.           Please follow up with PCP within 1-2 weeks to discuss this hospitalization and for continued care, call to schedule an appointment.  Also please followup with your rheumatologist Dr Trevizo and with pulmonologist.      It has been a pleasure caring for you at [facility] [place], we wish you the best of health moving forward.    "

## 2024-07-23 NOTE — NURSING NOTE
Pt discharge instructions reviewed with pt. Pt lasix prn dose had contraindications per her allergy. Pt was told to follow up with pcp. Pt aware of risks vs benefits of lasix dose. Provider aware.

## 2024-07-23 NOTE — DISCHARGE SUMMARY
Hospital Medicine Service -  Inpatient Discharge Summary        Reason patient was admitted to the hospital:   Palpitations     Found to have:   Acute pulmonary edema 2/2 ac/chr HF  Pleural effusion - left but too small to collect  SCL-ILD - radiographically stable  pHTN group 1 due to SCL  Pericardial effusion    Important Issues to Address in Follow-Up  F/u with Pulm and also Rheum - ? Consideration for Rilonacept for recurrent pericarditis?  Close f/u in chronic illness   needs rescheduling of ION outpatient.        BRIEF OVERVIEW   Admitting Provider: Eladio Patel MD  Attending Provider: Corinne Ortez DO Attending phys phone: [phone]    PCP: Sara Simmons [phone]    Admission Date: 7/19/2024  Discharge Date: 7/23/2024     DISCHARGE DIAGNOSES      Primary Discharge Diagnosis  Pulmonary edema, acute (CMS/HCC)    Secondary Discharge Diagnoses  Active Hospital Problems    Diagnosis Date Noted    Interstitial lung disease (CMS/HCC) 11/28/2022     Priority: High    Pleural effusion 07/21/2024    Pulmonary edema, acute (CMS/HCC) 07/19/2024    Multiple open wounds of lower extremity 05/22/2024    Chest pain, unspecified type 05/22/2024    Acute chest pain 04/16/2024    GERD (gastroesophageal reflux disease) 04/16/2024    Hypothyroid 03/04/2024    Raynaud's disease 08/09/2022    Essential (primary) hypertension 08/09/2022      Resolved Hospital Problems   No resolved problems to display.       Problem List on Day of Discharge  Interstitial lung disease (CMS/HCC)  Assessment & Plan  -She has ongoing bouts of SOB and require 2L NC at baseline.  - Follows with pulmonary (Dr. Yovani Wasserman) and Cardiology (Dr. Arvizu). Recently completed course of colchicine and prednisone for pericarditis. -Current reigmen includes sildenafil and macitentan.   - Continue sildenafil and macitentan.   - She previously required treatment with steroids and tocilizumab, appreciate pulmonology input regarding need during  this hospitalization     Pleural effusion  Assessment & Plan  -New small pleural effusion noted but not enough fluid for IR to tap.     Multiple open wounds of lower extremity  Assessment & Plan  Continue local wound care as per home regimen     Chest pain, unspecified type  Assessment & Plan  Without worsening, supportive care     GERD (gastroesophageal reflux disease)  Assessment & Plan  Pantoprazole 20    Acute chest pain  Assessment & Plan  Notes intermittent episodes of chest pain which has been ongoing, worse in last few days. History and workup thus far less suggestive of ACS, suspect MSK etiology as pain is reproducible on palpation.  - Maintain on supportive treatment  -endsores left side discomfort without worsening today  Appreciate pulm recs:  IR thoracentesis recommended with cyto (however, not enough fluid, could not do - US completed); will need eventual ION with EBUS for biopsy  Hold off on further steroids (did not respond in the past)  cont sildenafil and macitentan for pHTN per Dr Arvizu--No indication for additional dose of toci, continue with regular schedule  Diuresis prn, IS/Mobilize/OOB chair     Hypothyroid  Assessment & Plan  Hx Follicular Thyroid Cancer s/p Thyroidectomy (2013)  - Continued on  home levothyroxine 100mcg daily       Raynaud's disease  Assessment & Plan  She was diagnosed with systemic sclerosis in the 1990s - sclerodactyly, +RP with digital tip ulcers c/b L 2nd digit amputation, +MANINDER, +scl-70, telangiectasias. Currently on amlodipine 10mg in the morning and 10mg in the evening if she needs it. Also on sildenafil  - Continue home regimen as prescribed      Essential (primary) hypertension  Assessment & Plan  Cont PTA amlodipine 10mg qd.     * Pulmonary edema, acute (CMS/HCC)  Assessment & Plan  Known history of chronic hypoxemic respiratory failure in setting of ILD from systemic sclerosis. CT scan concerning for new small left pleural effusion and suspected mild pulmonary  edema. BNP elevated compared to prior. Favor volume oveload on the background of her known ILD as contributing to her cardiopulmonary complaints. Low concern for active infection despite air space opacities noted on CT. Given trial of IV diuresis in ED.  - Monitor clinical response to IV diuresis and provide further treatments accordingly  - Follow ESR and CRP to further elucidate inflammatory etiology   - Defer ABX for now  - IR for pleural effusion tap and studies - COULD NOT GET ENOUGH FLUID, further w/u per Pulm consideration      SUMMARY OF HOSPITALIZATION      Presenting Problem/History of Present Illness  This is a 63 y.o. year-old female admitted on 7/19/2024 with Shortness of breath [R06.02]  Pericardial effusion [I31.39]  Pleural effusion [J90]  Pulmonary edema, acute (CMS/HCC) [J81.0]  Chest pain, unspecified type [R07.9].    Hospital Course/Summary- see Problem list above for details:    Arielle Felix is a 63 y.o. female with Cutaneous Systemic Scleroderma/diffuse systemic scherlsosis comblicated by secondary Raynaud's (dx 1998) c/b ILD and Group 1 Pulmonary HTN, HFpEF, HTN, HLD, GERD, protein calorie malnutrition,  systemic lupus erythematosus, hx PE  presenting from outpt TTE for palpitations.    Pulmonlogy consulted and recommended consideration for rilonacept for recurrent pericarditis outpatient, defer to Dr Trevizo. Also holding off on steroids given minimal improvement in the past. She can continue diuresis on as needed basis and sildenafil and macitentna for pHTN .     IR was unable to perform thora due to nature of small effusion collection this admission. She needs rescheduling of ION outpatient.      She had an episode of ?reaction to morphine overnight 7/21-22, primarily characterized by emesis/ dyspnea noted and ?warmth of face/skin tightening, morphine as allergy vs contraindication is in MAR. She continued to have some warmth improved with benadryl and cool compresses to face without  s/s hives or anaphylaxis or angioedema per se.   Morphine is currently listed in MAR as contraindicated medication.     Exam on Day of Discharge  Vitals and nursing note reviewed.   General:chronically ill looking, skin tightening, some warmth and tight skin around face  HEENT: Symmetric, PERRL/EOMI, moist membranes, NCAT  Cardiovascular: Regular rate and rhythm, normal S1/S2, no murmurs, rubs, gallops, no JVD  Pulmonary: Clear to auscultation bilaterally, no wheezing, rhonchi  GI: Soft, nontender, nondistended, bowel sounds normal, no organomegaly  Musculoskeletal: Normal bulk and tone, normal ROM  SKIN: scarring, hyper/hypopigmentation on face, scalp, upper and lower extremities, has missing digits in in bilateral hand fingers  Extremities: Distal pulses intact, No LE edema bilaterally  Neuro: CN2-12 intact, sensation intact, with no motor deficits  Psych: Normal mood and affect        Consults During Admission  IP CONSULT TO PULMONOLOGY/SLEEP MEDICINE  IP CONSULT TO WOUND OSTOMY CONTINENCE    DISCHARGE MEDICATIONS               Medication List        START taking these medications      furosemide 20 mg tablet  Commonly known as: LASIX  Take 1 tablet (20 mg total) by mouth daily as needed (If Take a lasix pill if you gain more than 2-3 lbs in 1 day or 5lbs in 1 week & call your doctor).  Dose: 20 mg            CONTINUE taking these medications      ACTEMRA 200 mg/10 mL (20 mg/mL) solution  Infuse 8 mg/kg into a venous catheter every 28 (twentyeight) days.  Dose: 8 mg/kg  Generic drug: tocilizumab     amLODIPine 5 mg tablet  Commonly known as: NORVASC  Take 10 mg by mouth every morning.  Dose: 10 mg     atorvastatin 40 mg tablet  Commonly known as: LIPITOR  Take 1 tablet (40 mg total) by mouth daily.  Dose: 40 mg     benzonatate 100 mg capsule  Commonly known as: TESSALON  Take 100 mg by mouth 3 (three) times a day as needed for cough.  Dose: 100 mg     biotin 1 mg tablet  Take 1,000 mcg by mouth daily.  Dose:  1,000 mcg     cholecalciferol (vitamin D3) 1,000 unit (25 mcg) tablet  Take 1,000 Units by mouth daily.  Dose: 1,000 Units     cyanocobalamin (vitamin B-12) 1,000 mcg capsule  Take 1,000 mcg by mouth daily.  Dose: 1,000 mcg     docusate sodium 100 mg capsule  Commonly known as: COLACE  Take 100 mg by mouth daily as needed for constipation.  Dose: 100 mg     levothyroxine 100 mcg tablet  Commonly known as: SYNTHROID  Take 100 mcg by mouth daily. BRAND ONLY  Dose: 100 mcg     lidocaine 4 % adhesive patch,medicated topical patch  Commonly known as: ASPERCREME  Apply 1 patch topically daily as needed (pain). Remove & discard patch within 12 hours or as directed by prescriber.  Dose: 1 patch     loperamide 2 mg capsule  Commonly known as: IMODIUM  Take 2 mg by mouth every 8 (eight) hours as needed for diarrhea.  Dose: 2 mg     macitentan 10 mg tablet tablet  Commonly known as: OPSUMIT  Take 1 tablet (10 mg total) by mouth daily.  Dose: 10 mg     meclizine 25 mg tablet  Commonly known as: ANTIVERT  Take 25 mg by mouth daily as needed.  Dose: 25 mg     MEDIHONEY (HONEY) TOP  Apply topically daily.     mupirocin 2 % ointment  Commonly known as: BACTROBAN  Apply 1 application. topically daily. Behind ears  Dose: 1 application.     pantoprazole 20 mg EC tablet  Commonly known as: PROTONIX  Take 20 mg by mouth daily.  Dose: 20 mg     SantyL ointment  Apply 1 Application topically daily.  Dose: 1 Application  Generic drug: collagenase     sildenafiL (pulm.hypertension) 20 mg tablet  Commonly known as: REVATIO  Take 1 tablet (20 mg total) by mouth 3 (three) times a day.  Dose: 20 mg     sodium chloride 0.65 % nasal spray  Commonly known as: OCEAN  Administer 2 sprays into each nostril daily.  Dose: 2 spray               Instructions for after discharge       Discharge diet      Diet Type / Texture: Regular    Follow-up with department      Floridalma Trevizo DO   [phone]    [address]  [address]  [place] [id number]        Follow-up with primary physician (PCP)      It is very important that you followup with your primary care physician within one to 2 weeks of discharge. Your doctor should see you after your hospital stay and review a discharge summary which highlights what was done and changed during your hospital stay.    Follow-up with provider      Celio Kapoor MD   [phone]    [address] [place] [address]  [address]  [place] [id number]       Post-Discharge Activity: Normal activity as tolerated.      Normal activity as tolerated.               PROCEDURES / LABS / IMAGING      Operative Procedures  N/a (not enough fluid to collect on pleural effusion US only done 7/22 (Trace pleural fluid noted on limited left chest ultrasound exam. Too  small to allow for safe thoracentesis. The procedure was deferred.)    Other Procedures  none    Pertinent Labs  CBC Results         07/20/24 07/19/24 06/22/24     0414 1432 1346    WBC 7.27 9.78 9.83    RBC 3.98 4.46 3.95    HGB 9.8 10.9 9.9    HCT 32.0 35.9 34.0    MCV 80.4 80.5 86.1    MCH 24.6 24.4 25.1    MCHC 30.6 30.4 29.1     212 359          CMP Results         07/20/24 07/19/24 06/22/24     0415 1432 1346     143 141    K 4.2 4.0 3.8    Cl 107 110 109    CO2 25 23 20    Glucose 85 93 140    BUN 17 15 14    Creatinine 1.2 1.0 1.0    Calcium 9.3 9.7 9.5    Anion Gap 10 10 12    AST -- 14 17    ALT -- 7 7    Albumin -- 4.1 3.7    EGFR 51.0 >60.0 >60.0           Comment for K at 1432 on 07/19/24: Results obtained on plasma. Plasma Potassium values may be up to 0.4 mEQ/L less than serum values. The differences may be greater for patients with high platelet or white cell counts.    Comment for K at 1346 on 06/22/24: Results obtained on plasma. Plasma Potassium values may be up to 0.4 mEQ/L less than serum values. The differences may be greater for patients with high platelet or white cell counts.    Comment for EGFR at 0415 on 07/20/24: Calculation based on the Chronic  Kidney Disease Epidemiology Collaboration (CKD-EPI) equation refit without adjustment for race.    Comment for EGFR at 1432 on 07/19/24: Calculation based on the Chronic Kidney Disease Epidemiology Collaboration (CKD-EPI) equation refit without adjustment for race.    Comment for EGFR at 1346 on 06/22/24: Calculation based on the Chronic Kidney Disease Epidemiology Collaboration (CKD-EPI) equation refit without adjustment for race.          Troponin I Results         07/19/24 07/19/24 06/22/24     1640 1432 1608    HS Troponin I 89.2 90.3 16.3           Comment for HS Troponin I at 1640 on 07/19/24: Consistent with previous results      Comment for HS Troponin I at 1432 on 07/19/24: Result rechecked  Filtered to eliminate fibrin              SARS-CoV-2 (COVID-19) (no units)   Date/Time Value   03/03/2024 1515 Negative       Pertinent Imaging  ULTRASOUND CHEST   Preliminary Result   IMPRESSION: Thoracentesis deferred.      This service rendered by Zohra Vidal PA-C      I certify that I have personally reviewed this examination and agree with Zohra Vidal's report.   Russ Rodriguez M.D.         CT HEAD WITHOUT IV CONTRAST   Final Result   IMPRESSION:      1.  No evidence of acute territorial infarction, acute intracranial hemorrhage,   or mass effect.   2.  Partial opacification of the paranasal sinuses which may be secondary to   sinusitis.      CT ANGIOGRAPHY CHEST PULMONARY EMBOLISM WITH IV CONTRAST   Final Result   IMPRESSION:   1.  Negative evaluation for pulmonary embolism.   2.  New small left pleural effusion. Increased irregular left basilar airspace   disease and ongoing 16mm nodule, which was hypermetabolic on recent PET/CT.   Malignancy with pleural metastatic disease is not excluded. Consider diagnostic   thoracentesis.   3.  New/increased airspace disease also in the dependent and basilar right lung   suggesting pneumonia or pneumonitis.   4.  Suspected mild pulmonary edema.   5.  Moderate  pericardial effusion, mildly increased.   6.  New small partially loculated left pleural effusion.   7.  Mildly enlarged mediastinal and hilar lymph nodes, which may be inflammatory   or metastatic in nature.      In accordance with [place] Act 112,  the patient will receive a letter notifying them   to follow up with their physician.      X-RAY CHEST 1 VIEW   ED Interpretation   No change compared to prior      Final Result   IMPRESSION:   Unchanged marked cardiomegaly (with known pericardial effusion on prior CT) and   vascular congestion with diffuse interstitial opacity likely reflecting mild   edema.      Coarse basilar interstitial opacities related to known fibrotic lung disease is   less well appreciated radiographically.      Left basilar pleural-parenchymal opacity probably related to a concomitant small   pleural effusion and associated retrocardiac atelectasis or consolidation.                           ECG 12 lead   Final Result          OUTPATIENT  FOLLOW-UP / REFERRALS / PENDING TESTS        Outpatient Follow-Up Appointments            In 2 months Timothy Arvizu DO [facility] General Cardiology at [facility]            Referrals  No orders of the defined types were placed in this encounter.      Test Results Pending at Discharge  Unresulted Labs (From admission, onward)       Start     Ordered    07/21/24 1518  Fungal culture / smear Pleural Fluid, Left  (Thoracentesis Left Diagnostic with labs in IR)  Once        Question Answer Comment   Collect in IR Yes    Release to patient Immediate        07/21/24 1517    07/21/24 1518  Body Fluid Cell Count (w/ Diff) Pleural Fluid, Left  (Thoracentesis Left Diagnostic with labs in IR)  Once        Question Answer Comment   Collect in IR Yes    Release to patient Immediate        07/21/24 1517    07/21/24 1518  AFB culture Pleural Fluid, Left  (Thoracentesis Left Diagnostic with labs in IR)  Once        Question Answer Comment   Collect in IR  Yes    Release to patient Immediate        07/21/24 1517    07/21/24 1518  Lactate dehydrogenase, body fluid Pleural Fluid, Left  (Thoracentesis Left Diagnostic with labs in IR)  Once        Question Answer Comment   Collect in IR Yes    Release to patient Immediate        07/21/24 1517    07/21/24 1518  Cytology, Fluids Pleural Fluid, Left  (Thoracentesis Left Diagnostic with labs in IR)  Once        Question Answer Comment   Specimen Source Pleural Fluid, Left    Collect in IR Yes    Release to patient Immediate        07/21/24 1517    07/21/24 1518  Protein, body fluid Pleural Fluid, Left  (Thoracentesis Left Diagnostic with labs in IR)  Once        Question Answer Comment   Collect in IR Yes    Release to patient Immediate        07/21/24 1517    07/21/24 1518  Glucose, body fluid Pleural Fluid, Left  (Thoracentesis Left Diagnostic with labs in IR)  Once        Question Answer Comment   Collect in IR Yes    Release to patient Immediate        07/21/24 1517    07/21/24 1518  pH, body fluid Pleural Fluid, Left  (Thoracentesis Left Diagnostic with labs in IR)  Once        Question Answer Comment   Collect in IR Yes    Release to patient Immediate        07/21/24 1517    07/21/24 1518  Fungal Stain Pleural Fluid, Left  PROCEDURE ONCE        Question:  Release to patient  Answer:  Immediate    07/21/24 1518    07/21/24 1518  Fungal Culture Pleural Fluid, Left  PROCEDURE ONCE        Question:  Release to patient  Answer:  Immediate    07/21/24 1518    07/21/24 1518  Body Fluid Cell Count Pleural Fluid, Left  PROCEDURE ONCE        Question:  Release to patient  Answer:  Immediate    07/21/24 1518    07/21/24 1518  AFB stain Pleural Fluid, Left  PROCEDURE ONCE        Question:  Release to patient  Answer:  Immediate    07/21/24 1518    07/21/24 1518  AFB Culture Pleural Fluid, Left  PROCEDURE ONCE        Question:  Release to patient  Answer:  Immediate    07/21/24 1518    07/21/24 1517  Body Fluid Culture/Smear Pleural  Fluid, Left  (Thoracentesis Left Diagnostic with labs in IR)  Once        Question Answer Comment   Collect in IR Yes    Release to patient Immediate        07/21/24 1517                        DISCHARGE DISPOSITION AND DESTINATION      Disposition: Home   Destination: Home                            Discharge Diet:        Dietary Orders   (From admission, onward)                 Start     Ordered    07/20/24 1716  Dietary nutrition supplements  Once        Question Answer Comment   Select Supplement ([facility]): Boost Plus Chocolate    Meal period Breakfast        07/20/24 1715    07/20/24 1716  Dietary nutrition supplements  Once        Question Answer Comment   Select Supplement ([facility]): Ensure Plus Strawberry    Meal period Dinner        07/20/24 1715    07/20/24 0933  Adult Diet Regular; RD/LDN may adjust order  Diet effective now        References:    IDDSI Diet reference   Question Answer Comment   Diet Texture Regular    Delegation of Authority. Diet orders written by PA/Rosenda may not be adjusted by RD/LDNs. RD/LDN may adjust order        07/20/24 0933                     Code Status At Discharge: Full Code    Physician Order for Life-Sustaining Treatment Document Status        No documents found                    38 minutes were spent with patient and or family members collecting history and coordinating discharge plan and materials.

## 2024-07-23 NOTE — NURSING NOTE
Per provider, pt is adequate for discharge. All belongings packed. All discharge instructions reviewed with pt who understands and agrees with all discharge instructions. Pt IV removed. Pt removed from tele. Pt to discharge home with family in car.

## 2024-07-23 NOTE — TELEPHONE ENCOUNTER
[facility] at [place]  Medicine Refill Request      MA Notes:      Nurse Notes: pt of Dr Timothy Arvizu.      Last Office Visit: 6/20/2024  Last Telemedicine Visit: Visit date not found    Next Office Visit: 10/10/2024  Next Telemedicine Visit: Visit date not found     Most Recent BP Readings:  BP Readings from Last 3 Encounters:   07/23/24 (!) 120/57   07/19/24 (!) 154/80   06/22/24 (!) 155/74       Most recent Lab results:  Lab Results   Component Value Date    CHOL 148 03/07/2024    HDL 49 (L) 03/07/2024    TRIG 70 03/07/2024    NONHDLCALC 99 03/07/2024       Lab Results   Component Value Date    AST 14 07/19/2024    ALT 7 07/19/2024       Lab Results   Component Value Date     07/20/2024    K 4.2 07/20/2024    BUN 17 07/20/2024    CREATININE 1.2 07/20/2024       Current Medications:  No current facility-administered medications for this visit.  No current outpatient medications on file.    Facility-Administered Medications Ordered in Other Visits:     acetaminophen (TYLENOL) tablet 975 mg, 975 mg, oral, q8h PRN, Ba Fgiueroa MD, 975 mg at 07/23/24 0227    amLODIPine (NORVASC) tablet 10 mg, 10 mg, oral, q AM, Eladio Patel MD, 10 mg at 07/22/24 0852    atorvastatin (LIPITOR) tablet 40 mg, 40 mg, oral, Daily (6p), Eladio Patel MD, 40 mg at 07/22/24 1649    benzonatate (TESSALON) capsule 100 mg, 100 mg, oral, 3x daily PRN, Eladio Patel MD    cholecalciferol (vitamin D3) tablet 1,000 Units, 1,000 Units, oral, Daily, Eladio Patel MD, 1,000 Units at 07/22/24 0852    diphenhydrAMINE (BENADRYL) capsule 25 mg, 25 mg, oral, q4h PRN, Corinne Ortez DO, 25 mg at 07/22/24 1649    docusate sodium (COLACE) capsule 100 mg, 100 mg, oral, Daily PRN, Eladio Patel MD    honey (MEDIHONEY) 100 % topical paste, , Topical, Daily PRN, Eladio Patel MD    levothyroxine (SYNTHROID) tablet 100 mcg, 100 mcg, oral, Daily (6:30a), Ba Figueroa MD, 100 mcg at 07/23/24 0506    lidocaine (ASPERCREME) 4 % topical patch 1  patch, 1 patch, Topical, Daily, Simsovithony, DO Amanda, 1 patch at 07/22/24 0852    lidocaine-prilocaine (EMLA) 2.5-2.5 % cream, , Topical, BID (8a, 8p), Ba Figueroa MD, Given at 07/22/24 1015    macitentan (OPSUMIT) tablet 10 mg, 10 mg, oral, Daily, Eladio Patel MD, 10 mg at 07/22/24 0910    mupirocin (BACTROBAN) 2 % ointment 1 Application, 1 Application, Topical, Daily, Eladio Patel MD, 1 Application at 07/22/24 1016    pantoprazole (PROTONIX) tablet,delayed release (DR/EC) 20 mg, 20 mg, oral, Daily, Eladio Patel MD, 20 mg at 07/22/24 0853    sildenafiL (pulm.hypertension) (REVATIO) tablet 20 mg, 20 mg, oral, TID, Eladio Patel MD, 20 mg at 07/22/24 1945    sodium chloride (OCEAN) 0.65 % nasal spray 2 spray, 2 spray, Each Nostril, Daily, Eladio Patel MD, 2 spray at 07/22/24 1014

## 2024-07-23 NOTE — ASSESSMENT & PLAN NOTE
Hx Follicular Thyroid Cancer s/p Thyroidectomy (2013)  - Continued on  home levothyroxine 100mcg daily

## 2024-07-23 NOTE — UM PHYSICIAN REVIEW NOTE
Medicare    7/19 - still inpatient 7/23 7/19 - 63F with PMH of htn, pulm htn, raynauds, scleroderma, ild, pe presented with palpitations. 110 HR. Started on IV diuresis in Ed, pulm edema with ct showing pleural effusion. Pulm consulted.    7/20  - pulm recommends IR consult for tap. Could be malignant effusion with pulm nodules and LAD on imaging.     7/21 - IR consulted. Having 10/10 pain headache. Received morphine. Started vomiting, dizzy. RRT for possible reaction. Given cocktail.     7/22 - IR attempted thora. Not enough fluid. Continue supportive care    7/23 - still inpatient    Will send for p2p.

## 2024-07-23 NOTE — PLAN OF CARE
I provided cross-coverage for this pt this evening. Received epic secure chat from RN that pt reports she is allergic to Lasix. The primary medical team discharged her home on PRN lasix. I noted that she does NOT have lasix listed in her allergy list and that she received IV lasix on the 19th per the MAR. The pt reports that she developed a rash afterward. Pt reports the allergy to lasix is because it has sulfa in it and she's allergic to sulfa. She was advised to discuss this further with her PCP. RN provided education regarding risks of not taking the lasix as prescribed.

## 2024-07-23 NOTE — PLAN OF CARE
Care Coordination Admission Assessment Note    General Information:  Readmission Within the last 30 days:    Does patient have a :    Patient-Specific Goals (include timeframe): Return home    Living Arrangements:  Arrived From: home  Current Living Arrangements: home  People in Home: alone  Home Accessibility: stairs to enter home (Group), stairs within home (Group), stair glide  Living Arrangement Comments: Pt resides alone in a 2  with 6 LARRY. Stair glide between 1st and 2nd floors.    Functional Status Prior to Admission:   Assistive Device/Animal Currently Used at Home: walker, standard, wheelchair, oxygen, stair glide (2L O2 nasal cannula; Pt unsure of company)  Functional Status Comments: Pt states to be somewhat independent at home with ADLs. Pt with home health aides that assist as needed around the house. They assist with bathing, dressing, etc.  IADL Comments:       Supports and Services:  Current Outpatient/Agency/Support Group: homecare agency (24/[facility])  Type of Current Home Care Services: home health aide  History of home care episode or rehab stay: Pt reports use of [facility]  in the past. Denies SNF history.    Discharge Needs Assessment:   Concerns to be Addressed: discharge planning, care coordination/care conferences  Current Discharge Risk: lives alone, dependent with mobility/activities of daily living  Anticipated Changes Related to Illness: none    Patient/Family Anticipated Discharge Plan:  Patient/Family Anticipates Transition To: home with help/services  Patient/Family Anticipated Services at Transition: home health care    Connection to Community  Patient declined offered resources.    Patient Choice:   Offered/Gave Vendor List: yes  Patient's Choice of Community Agency(s): [facility]   Patient and/or patient guardian/advocate was made aware of their right to choose a provider. A list of eligible providers was presented and reviewed with the patient and/or patient  guardian/advocate in written and/or verbal form. The list delineates providers in the patient’s desired geographic area who are participating in the Medicare program and/or providers contracted with the patient’s primary insurance. The Medicare list and quality ratings were obtained from the Medicare.gov [medicare.gov] website.    Anticipated Discharge Plan:  Met with patient. Provided education and contact information for Care Coordination services.: yes  Anticipated Discharge Disposition: home with assistance, home with home health  Type of Home Care Services: home OT, home health aide, home PT, nursing    Transportation Needs (SDOH):  Transportation Concerns:    Transportation Anticipated: family or friend will provide (Daughter to transport and bring portable O2 tank)  Is Out of Hospital DNR needed at discharge?: no    Concerns - comments: None at this time

## 2024-07-23 NOTE — PLAN OF CARE
Problem: Adult Inpatient Plan of Care  Goal: Plan of Care Review  7/23/2024 1822 by Lucy Banda RN  Outcome: Progressing  Flowsheets (Taken 7/23/2024 1758)  Progress: improving  Outcome Evaluation: Pt AAOx4, c/o pain in chest relieved with tylenol and lidocaine patch. Pt tolerated all medications well. OOB today. FSP in place. Call light within reach. Per provider, pt is adequate for discharge.  Plan of Care Reviewed With: patient  7/23/2024 1754 by Lucy Banda RN  Outcome: Progressing  Goal: Patient-Specific Goal (Individualized)  7/23/2024 1822 by Lucy Banda RN  Outcome: Progressing  7/23/2024 1754 by Lucy Banda RN  Outcome: Progressing  Goal: Absence of Hospital-Acquired Illness or Injury  7/23/2024 1822 by Lucy Banda RN  Outcome: Progressing  7/23/2024 1754 by Lucy Banda RN  Outcome: Progressing  Goal: Optimal Comfort and Wellbeing  7/23/2024 1822 by Lucy Banda RN  Outcome: Progressing  7/23/2024 1754 by Lucy Banda RN  Outcome: Progressing  Goal: Readiness for Transition of Care  7/23/2024 1822 by Lucy Banda RN  Outcome: Progressing  7/23/2024 1754 by Lucy Banda RN  Outcome: Progressing     Problem: Fall Injury Risk  Goal: Absence of Fall and Fall-Related Injury  7/23/2024 1822 by Lucy Banda RN  Outcome: Progressing  7/23/2024 1754 by Lucy Banda RN  Outcome: Progressing

## 2024-07-23 NOTE — PLAN OF CARE
Care Coordination Discharge Plan Note     Discharge Needs Assessment  Concerns to be Addressed: discharge planning, care coordination/care conferences  Current Discharge Risk: lives alone, dependent with mobility/activities of daily living    Anticipated Discharge Plan  Anticipated Discharge Disposition: home with assistance, home with home health  Type of Home Care Services: home OT, home health aide, home PT, nursing      Patient Choice  Offered/Gave Vendor List: yes  Patient's Choice of Community Agency(s): [facility]    Patient and/or patient guardian/advocate was made aware of their right to choose a provider. A list of eligible providers was presented and reviewed with the patient and/or patient guardian/advocate in written and/or verbal form. The list delineates providers in the patient’s desired geographic area who are participating in the Medicare program and/or providers contracted with the patient’s primary insurance. The Medicare list and quality ratings were obtained from the Medicare.gov [medicare.gov] website.    -----------------------------------------------------------    Concerns Comments: Per VÍCTOR reinoso from [facility] they can accept  referral, [facility] to follow.    Discharge Plan:   Disposition/Destination: Home Health Care - Other / Home  Discharge Facility:    Community Resources:      Discharge Transportation:  Is Out of Hospital DNR needed at Discharge: no  Does patient need discharge transport?

## 2024-07-24 ENCOUNTER — TELEPHONE (OUTPATIENT)
Dept: SCHEDULING | Facility: CLINIC | Age: 64
End: 2024-07-24
Payer: COMMERCIAL

## 2024-07-24 RX ORDER — ETHACRYNIC ACID 25 MG/1
25 TABLET ORAL DAILY
Qty: 90 TABLET | Refills: 1 | Status: ON HOLD | OUTPATIENT
Start: 2024-07-24 | End: 2024-08-08 | Stop reason: SDUPTHER

## 2024-07-24 NOTE — TELEPHONE ENCOUNTER
"Called and spoke to her. She was discharged yesterday and reports feeling \"terrible\", she states she is still having a lot chest pain and shortness of breath despite being on O2.   She said they gave her IV lasix and IV morphine and she is allergic to both and broke out in rash and her face swelled. She states her weight at discharge was 109lbs she does not have a scale at home to weigh herself but states her abdomen feels full but denies any LE swelling.   Admits to PND. She is very upset about her chest pain and why they couldn't remove the fluid.     I explained to her that the pericardial effusion was very small and cannot be removed.     She reports she was told she may have lung CA based on CT and was told she may need a biopsy. She is wondering if we can help connect her to complete it.   I told her once I clarify with Dr. Arvizu I will let her know.    In the meantime we will start her on Ethacrynic Acid 25 mg daily.   "

## 2024-07-24 NOTE — TELEPHONE ENCOUNTER
Called Ms. Felix back, informed her that Dr. Arvizu and Dr. Wasserman discussed and agreed that she should undergo bronchoscopy and biopsy of her lung.   I told her someone from Dr. Wasserman's office will contact her.     She was thankful for the call.

## 2024-07-24 NOTE — TELEPHONE ENCOUNTER
Call received from  [facility] to report to the pt contacting their office for direction re lasix and Sulfa allergy    Advised Vicente that the pt has been in contact with our office and the staff is currently working on resolution on the pt's concerns/sx    Vicente can be reached at [phone] if needed

## 2024-07-24 NOTE — TELEPHONE ENCOUNTER
"I think they gave her IV Lasix in the hospital and tolerated it without any reactions.     Eventhough she has a sulfa allergy and Lasix is \"sulfa based\" the warning is a just a pre-caution and not a contraindication especially that she will take it as needed.   "

## 2024-07-24 NOTE — TELEPHONE ENCOUNTER
"BLAS/SM- Patient asking for a call back directly from either of you. Thank you.     I called the patient-     The patient immediately complained of left side & chest pain. This is not new. The patient reported she has this pain with & without exercise. She also has a pain in her \"left side\". I asked multiple times where that was located, but she was unable to describe exactly where on the body. I told her if she is having unrelieved active chest pain, she should seek emergency care. She voiced that they have no been doing anything and does not want to sit there. She is declining at this time.     She reports that they did an echo while on the hospital, but they \"couldn't get the fluid out\".     The patient is also very upset,  breathless, & crying while on the phone with me.     The patient reports  \"They keep giving me lasix & it keeps making me break out\". The patient reports that she is unable tolerate and the pharmacy will not fill if she attempts to get it.     She is not understanding why her biopsy cannot be done & does not want to go to the hospital.     She is asking specifically for Dr. Arvizu or Urmila to call her back regarding this call. I told her I will communicate this.   "

## 2024-07-25 ENCOUNTER — HOSPITAL ENCOUNTER (EMERGENCY)
Facility: HOSPITAL | Age: 64
Discharge: HOME | End: 2024-07-25
Attending: EMERGENCY MEDICINE
Payer: COMMERCIAL

## 2024-07-25 ENCOUNTER — APPOINTMENT (EMERGENCY)
Dept: RADIOLOGY | Facility: HOSPITAL | Age: 64
End: 2024-07-25
Attending: EMERGENCY MEDICINE
Payer: COMMERCIAL

## 2024-07-25 VITALS
TEMPERATURE: 97.8 F | HEART RATE: 132 BPM | OXYGEN SATURATION: 95 % | SYSTOLIC BLOOD PRESSURE: 155 MMHG | RESPIRATION RATE: 18 BRPM | DIASTOLIC BLOOD PRESSURE: 77 MMHG

## 2024-07-25 DIAGNOSIS — I50.9 ACUTE CONGESTIVE HEART FAILURE, UNSPECIFIED HEART FAILURE TYPE (CMS/HCC): ICD-10-CM

## 2024-07-25 DIAGNOSIS — R06.02 SHORTNESS OF BREATH: Primary | ICD-10-CM

## 2024-07-25 DIAGNOSIS — J84.9 ILD (INTERSTITIAL LUNG DISEASE) (CMS/HCC): ICD-10-CM

## 2024-07-25 LAB
ALBUMIN SERPL-MCNC: 4.3 G/DL (ref 3.5–5.7)
ALP SERPL-CCNC: 82 IU/L (ref 34–125)
ALT SERPL-CCNC: 8 IU/L (ref 7–52)
ANION GAP SERPL CALC-SCNC: 9 MEQ/L (ref 3–15)
AST SERPL-CCNC: 15 IU/L (ref 13–39)
BASOPHILS # BLD: 0.04 K/UL (ref 0.01–0.1)
BASOPHILS NFR BLD: 0.4 %
BILIRUB SERPL-MCNC: 0.4 MG/DL (ref 0.3–1.2)
BNP SERPL-MCNC: 220 PG/ML
BUN SERPL-MCNC: 17 MG/DL (ref 7–25)
CALCIUM SERPL-MCNC: 10.6 MG/DL (ref 8.6–10.3)
CHLORIDE SERPL-SCNC: 103 MEQ/L (ref 98–107)
CO2 SERPL-SCNC: 27 MEQ/L (ref 21–31)
CREAT SERPL-MCNC: 1.1 MG/DL (ref 0.6–1.2)
DIFFERENTIAL METHOD BLD: ABNORMAL
EGFRCR SERPLBLD CKD-EPI 2021: 56.6 ML/MIN/1.73M*2
EOSINOPHIL # BLD: 0.14 K/UL (ref 0.04–0.36)
EOSINOPHIL NFR BLD: 1.4 %
ERYTHROCYTE [DISTWIDTH] IN BLOOD BY AUTOMATED COUNT: 18.6 % (ref 11.7–14.4)
GLUCOSE SERPL-MCNC: 86 MG/DL (ref 70–99)
HCT VFR BLD AUTO: 37.3 % (ref 35–45)
HGB BLD-MCNC: 11.2 G/DL (ref 11.8–15.7)
IMM GRANULOCYTES # BLD AUTO: 0.04 K/UL (ref 0–0.08)
IMM GRANULOCYTES NFR BLD AUTO: 0.4 %
LYMPHOCYTES # BLD: 1.33 K/UL (ref 1.2–3.5)
LYMPHOCYTES NFR BLD: 12.9 %
MCH RBC QN AUTO: 24.1 PG (ref 28–33.2)
MCHC RBC AUTO-ENTMCNC: 30 G/DL (ref 32.2–35.5)
MCV RBC AUTO: 80.4 FL (ref 83–98)
MONOCYTES # BLD: 0.53 K/UL (ref 0.28–0.8)
MONOCYTES NFR BLD: 5.2 %
NEUTROPHILS # BLD: 8.21 K/UL (ref 1.7–7)
NEUTS SEG NFR BLD: 79.7 %
NRBC BLD-RTO: 0 %
PDW BLD AUTO: 11.6 FL (ref 9.4–12.3)
PLATELET # BLD AUTO: 331 K/UL (ref 150–369)
POTASSIUM SERPL-SCNC: 4.3 MEQ/L (ref 3.5–5.1)
PROT SERPL-MCNC: 8.8 G/DL (ref 6–8.2)
RBC # BLD AUTO: 4.64 M/UL (ref 3.93–5.22)
SODIUM SERPL-SCNC: 139 MEQ/L (ref 136–145)
TROPONIN I SERPL HS-MCNC: 66.8 PG/ML
TROPONIN I SERPL HS-MCNC: 83.4 PG/ML
WBC # BLD AUTO: 10.29 K/UL (ref 3.8–10.5)

## 2024-07-25 PROCEDURE — 96375 TX/PRO/DX INJ NEW DRUG ADDON: CPT | Mod: 59

## 2024-07-25 PROCEDURE — 93005 ELECTROCARDIOGRAM TRACING: CPT | Performed by: EMERGENCY MEDICINE

## 2024-07-25 PROCEDURE — 74177 CT ABD & PELVIS W/CONTRAST: CPT

## 2024-07-25 PROCEDURE — 93005 ELECTROCARDIOGRAM TRACING: CPT

## 2024-07-25 PROCEDURE — 83880 ASSAY OF NATRIURETIC PEPTIDE: CPT | Performed by: EMERGENCY MEDICINE

## 2024-07-25 PROCEDURE — 99284 EMERGENCY DEPT VISIT MOD MDM: CPT | Mod: 25

## 2024-07-25 PROCEDURE — 96374 THER/PROPH/DIAG INJ IV PUSH: CPT | Mod: 59

## 2024-07-25 PROCEDURE — 63700000 HC SELF-ADMINISTRABLE DRUG: Performed by: EMERGENCY MEDICINE

## 2024-07-25 PROCEDURE — 63600105 HC IODINE BASED CONTRAST: Mod: JZ | Performed by: EMERGENCY MEDICINE

## 2024-07-25 PROCEDURE — 84484 ASSAY OF TROPONIN QUANT: CPT | Performed by: EMERGENCY MEDICINE

## 2024-07-25 PROCEDURE — 80053 COMPREHEN METABOLIC PANEL: CPT | Performed by: EMERGENCY MEDICINE

## 2024-07-25 PROCEDURE — 85025 COMPLETE CBC W/AUTO DIFF WBC: CPT | Performed by: EMERGENCY MEDICINE

## 2024-07-25 PROCEDURE — 99223 1ST HOSP IP/OBS HIGH 75: CPT | Performed by: INTERNAL MEDICINE

## 2024-07-25 PROCEDURE — 84484 ASSAY OF TROPONIN QUANT: CPT | Mod: 91 | Performed by: EMERGENCY MEDICINE

## 2024-07-25 PROCEDURE — 71275 CT ANGIOGRAPHY CHEST: CPT

## 2024-07-25 PROCEDURE — 71045 X-RAY EXAM CHEST 1 VIEW: CPT

## 2024-07-25 PROCEDURE — 3E033GC INTRODUCTION OF OTHER THERAPEUTIC SUBSTANCE INTO PERIPHERAL VEIN, PERCUTANEOUS APPROACH: ICD-10-PCS | Performed by: EMERGENCY MEDICINE

## 2024-07-25 PROCEDURE — 63600000 HC DRUGS/DETAIL CODE: Mod: JZ | Performed by: EMERGENCY MEDICINE

## 2024-07-25 PROCEDURE — 36415 COLL VENOUS BLD VENIPUNCTURE: CPT | Performed by: EMERGENCY MEDICINE

## 2024-07-25 PROCEDURE — 3E0333Z INTRODUCTION OF ANTI-INFLAMMATORY INTO PERIPHERAL VEIN, PERCUTANEOUS APPROACH: ICD-10-PCS | Performed by: EMERGENCY MEDICINE

## 2024-07-25 RX ORDER — ETHACRYNIC ACID 25 MG/1
50 TABLET ORAL ONCE
Status: COMPLETED | OUTPATIENT
Start: 2024-07-25 | End: 2024-07-25

## 2024-07-25 RX ORDER — MORPHINE SULFATE 2 MG/ML
4 INJECTION, SOLUTION INTRAMUSCULAR; INTRAVENOUS ONCE
Status: DISCONTINUED | OUTPATIENT
Start: 2024-07-25 | End: 2024-07-25 | Stop reason: HOSPADM

## 2024-07-25 RX ORDER — LIDOCAINE 560 MG/1
1 PATCH PERCUTANEOUS; TOPICAL; TRANSDERMAL ONCE
Status: DISCONTINUED | OUTPATIENT
Start: 2024-07-25 | End: 2024-07-25 | Stop reason: HOSPADM

## 2024-07-25 RX ORDER — IOPAMIDOL 755 MG/ML
100 INJECTION, SOLUTION INTRAVASCULAR
Status: COMPLETED | OUTPATIENT
Start: 2024-07-25 | End: 2024-07-25

## 2024-07-25 RX ORDER — DIPHENHYDRAMINE HCL 50 MG/ML
50 VIAL (ML) INJECTION ONCE
Status: COMPLETED | OUTPATIENT
Start: 2024-07-25 | End: 2024-07-25

## 2024-07-25 RX ADMIN — IOPAMIDOL 100 ML: 755 INJECTION, SOLUTION INTRAVENOUS at 16:16

## 2024-07-25 RX ADMIN — DIPHENHYDRAMINE HYDROCHLORIDE 50 MG: 50 INJECTION INTRAMUSCULAR; INTRAVENOUS at 14:47

## 2024-07-25 RX ADMIN — ETHACRYNIC ACID 50 MG: 25 TABLET ORAL at 18:21

## 2024-07-25 RX ADMIN — LIDOCAINE 4% 1 PATCH: 40 PATCH TOPICAL at 17:32

## 2024-07-25 RX ADMIN — METHYLPREDNISOLONE SODIUM SUCCINATE 40 MG: 40 INJECTION, POWDER, FOR SOLUTION INTRAMUSCULAR; INTRAVENOUS at 14:47

## 2024-07-25 ASSESSMENT — ENCOUNTER SYMPTOMS
ABDOMINAL PAIN: 0
FEVER: 0
SHORTNESS OF BREATH: 1
COUGH: 1
DIAPHORESIS: 0
FATIGUE: 0

## 2024-07-25 NOTE — ED NOTES
Asked pt to stay so I can talk to provider about the fact that she is still tachycardic. Pt stated that she didn't want to wait and its on them for not helping my HR before I left.      Trey Jalloh, RN  07/25/24 2001

## 2024-07-25 NOTE — ED PROVIDER NOTES
Emergency Medicine Note  HPI   HISTORY OF PRESENT ILLNESS     Seen and Examined in ED 27    This is a 63-year-old female with underlying history of per chart review that includes scleroderma, pulmonary hypertension, lupus, hyperlipidemia, interstitial lung disease, hypertension presenting to the emergency department for evaluation of acute chest pain stating that it is midsternal in nature, additionally abdominal pain to left lower abdomen, states that she was coughing up blood earlier today on 2 L nasal cannula oxygen at home.       Recent admission for fluid overload, with questionable consolidation, recurrent pericarditis discharged 2024            Patient History   PAST HISTORY     Reviewed from Nursing Triage:       Past Medical History:   Diagnosis Date    De Quervain's tenosynovitis     Essential (primary) hypertension     Follicular carcinoma of thyroid (CMS/HCC)     Gastro-esophageal reflux     Hand ulceration (CMS/HCC)     Hyperlipidemia, unspecified     Interstitial lung disease (CMS/HCC)     Leg ulcer (CMS/HCC)     Lung nodule     Lupus (CMS/HCC)     Osteomyelitis of hand (CMS/HCC)     Osteoporosis     Pulmonary hypertension (CMS/HCC)     Raynaud's disease     Severe    Reflux esophagitis     Scleroderma (CMS/HCC)     Screening mammogram for breast cancer 2021    BI-RADS category: 1 - Negative (care everywhere @ [facility])       Past Surgical History:   Procedure Laterality Date    CARDIAC CATHETERIZATION      COLONOSCOPY      HERNIA REPAIR         Family History   Problem Relation Age of Onset    Heart disease Biological Brother         stent    Breast cancer Niece     Cervical cancer Neg Hx     Uterine cancer Neg Hx     Colon cancer Neg Hx     Ovarian cancer Neg Hx        Social History     Tobacco Use    Smoking status: Former     Types: Cigarettes     Quit date: 9/15/2005     Years since quittin.8    Smokeless tobacco: Never    Tobacco comments:     Would smoke 1/2 of 1/2 PPD, quit in     Vaping Use    Vaping Use: Never used   Substance Use Topics    Alcohol use: Never    Drug use: Never         Review of Systems   REVIEW OF SYSTEMS     Review of Systems   Constitutional:  Negative for diaphoresis, fatigue and fever.   Respiratory:  Positive for cough and shortness of breath.    Cardiovascular:  Positive for chest pain.   Gastrointestinal:  Negative for abdominal pain.         VITALS     ED Vitals      Date/Time Temp Pulse Resp BP SpO2 [facility]   07/25/24 1337 36.9 °C (98.5 °F) 131 -- 166/78 95 % AKT                         Physical Exam   PHYSICAL EXAM     Physical Exam  Constitutional:       General: She is not in acute distress.     Appearance: She is well-developed. She is not ill-appearing, toxic-appearing or diaphoretic.   HENT:      Head: Normocephalic.      Right Ear: External ear normal.      Left Ear: External ear normal.      Nose: Nose normal.      Mouth/Throat:      Mouth: Mucous membranes are dry.   Eyes:      Extraocular Movements: Extraocular movements intact.   Cardiovascular:      Rate and Rhythm: Tachycardia present.      Pulses: Normal pulses.      Heart sounds: Normal heart sounds.   Pulmonary:      Effort: Pulmonary effort is normal.      Breath sounds: Rales present. No wheezing.   Chest:      Chest wall: Tenderness (left chest wall) present.   Abdominal:      General: Abdomen is flat.      Tenderness: There is abdominal tenderness (llq pain.).   Musculoskeletal:         General: Normal range of motion.      Cervical back: Normal range of motion.      Right lower leg: No edema.      Left lower leg: No edema.   Neurological:      Mental Status: She is alert.         PROCEDURES     Procedures     DATA     Results       Procedure Component Value Units Date/Time    HS Troponin I (with 2 hour reflex) [[id number]]  (Abnormal) Collected: 07/25/24 1420    Specimen: Blood, Venous Updated: 07/25/24 1539     High Sens Troponin I 83.4 pg/mL      Comment: Result rechecked  Filtered to eliminate  fibrin         B-type natriuretic peptide [[id number]]  (Abnormal) Collected: 07/25/24 1420    Specimen: Blood, Venous Updated: 07/25/24 1517      pg/mL     Comprehensive metabolic panel [[id number]]  (Abnormal) Collected: 07/25/24 1420    Specimen: Blood, Venous Updated: 07/25/24 1506     Sodium 139 mEQ/L      Potassium 4.3 mEQ/L      Comment: Results obtained on plasma. Plasma Potassium values may be up to 0.4 mEQ/L less than serum values. The differences may be greater for patients with high platelet or white cell counts.        Chloride 103 mEQ/L      CO2 27 mEQ/L      BUN 17 mg/dL      Creatinine 1.1 mg/dL      Glucose 86 mg/dL      Calcium 10.6 mg/dL      AST (SGOT) 15 IU/L      ALT (SGPT) 8 IU/L      Alkaline Phosphatase 82 IU/L      Total Protein 8.8 g/dL      Comment: Test performed on plasma which typically contains approximately 0.4 g/dL more protein than serum.        Albumin 4.3 g/dL      Bilirubin, Total 0.4 mg/dL      eGFR 56.6 mL/min/1.73m*2      Comment: Calculation based on the Chronic Kidney Disease Epidemiology Collaboration (CKD-EPI) equation refit without adjustment for race.        Anion Gap 9 mEQ/L     CBC and differential [[id number]]  (Abnormal) Collected: 07/25/24 1420    Specimen: Blood, Venous Updated: 07/25/24 1440     WBC 10.29 K/uL      RBC 4.64 M/uL      Hemoglobin 11.2 g/dL      Hematocrit 37.3 %      MCV 80.4 fL      MCH 24.1 pg      MCHC 30.0 g/dL      RDW 18.6 %      Platelets 331 K/uL      MPV 11.6 fL      Differential Type Auto     nRBC 0.0 %      Immature Granulocytes 0.4 %      Neutrophils 79.7 %      Lymphocytes 12.9 %      Monocytes 5.2 %      Eosinophils 1.4 %      Basophils 0.4 %      Immature Granulocytes, Absolute 0.04 K/uL      Neutrophils, Absolute 8.21 K/uL      Lymphocytes, Absolute 1.33 K/uL      Monocytes, Absolute 0.53 K/uL      Eosinophils, Absolute 0.14 K/uL      Basophils, Absolute 0.04 K/uL                   ECG 12 lead   Independent Interpretation by ED  Provider   Procedure: EKG Analysis    Emergency Medicine Physician EKG Analysis  Rate: Sinus Tachycardia with a ventricular response rate of 135 beats per minute.  Rhythm: Sinus rhythm.  Axis: Normal.  Intervals: CT interval 128 ms, QRS duration 74 ms, QTc interval 462 ms.  ST-T wave abnormalities: There are no acute ischemic-appearing ST-T wave abnormalities visualized.  Comparison: Similar to prior EKGs on file          Scoring tools                                  ED Course & MDM   MDM / ED COURSE / CLINICAL IMPRESSION / DISPO     Medical Decision Making  Given the patient's new hemoptysis from prior will obtain CT PE scan to eval for new PE.  The patient's pain has been continuous, has not subsided.  This is not new, and has been similar to her prior.  Additionally patient has had episodes of bloody sputum in the past, however not recently.  Additionally patient has noted lower quadrant pain.  Will obtain CT abdomen pelvis to assess for diverticulitis, obstructive process.    Discuss with [place], Pulm.     Pain Control with morphine, lidocaine patch.     Problems Addressed:  ILD (interstitial lung disease) (CMS/HCC): acute illness or injury  Shortness of breath: acute illness or injury    Amount and/or Complexity of Data Reviewed  Independent Historian: caregiver     Details: Daughter at bedside helps to contribute to history  Labs: ordered. Decision-making details documented in ED Course.     Details: CBC reviewed, no acute abnormalities noted.  With patient's troponin improved from prior visit  Radiology: ordered. Decision-making details documented in ED Course.  ECG/medicine tests: ordered and independent interpretation performed.    Risk  OTC drugs.  Prescription drug management.        ED Course as of 07/25/24 1648   Thu Jul 25, 2024   7629 Discussed with pulm. They agree with CT PE. From they're standpoint. Rec adding steoids if patient in agreement. Outpat biopsy still rec in terms of lung nodule   [TL]    1505 Spoke with cards rec placing consult for cards. Dr. Arvizu will see [TL]   1543 High Sens Troponin I(!!): 83.4  Improved   [TL]   1647 CT ANGIOGRAPHY CHEST PULMONARY EMBOLISM WITH IV CONTRAST  No PE noted [TL]   1648 CT ABDOMEN PELVIS WITH IV CONTRAST  No obstructive process noted [TL]      ED Course User Index  [TL] Timo Mccarthy DO     Clinical Impression      None                Timo Mccarthy DO  07/25/24 5414

## 2024-07-26 ENCOUNTER — TELEPHONE (OUTPATIENT)
Dept: SCHEDULING | Facility: CLINIC | Age: 64
End: 2024-07-26
Payer: COMMERCIAL

## 2024-07-26 LAB
ATRIAL RATE: 135
P AXIS: 63
PR INTERVAL: 128
QRS DURATION: 74
QT INTERVAL: 308
QTC CALCULATION(BAZETT): 462
R AXIS: 11
T WAVE AXIS: 99
VENTRICULAR RATE: 135

## 2024-07-26 PROCEDURE — 93010 ELECTROCARDIOGRAM REPORT: CPT | Performed by: INTERNAL MEDICINE

## 2024-07-26 NOTE — CONSULTS
I had the pleasure of seeing Arielle Felix (: 1960) in the [facility] Heart Failure and Pulmonary Hypertension clinic on 2024. She presents today with her care giver, Sara.     Ms. Felix is a 63 y.o. female with a past medical history of Pulmonary HTN, HTN, Raynaud's Disease, Cutaneous Systemic Scleroderma (dx ), ILD, PE (3/2023). She is a patient of Dr. Nguyễn. She was previously followed by Dr. Jos Valdez at [facility]. Echocardiogram from 2022 showed LVEF: 55-60%, mild aortic valve thickening, pulmonary artery systolic pressure: 52 mmHg and trivial pericardial effusion. LHC and RHC (separate occasions) over the last 5-10 years which showed normal hemodynamics and normal coronaries. She had a RHC 2020 showing top-normal right sided filling pressures with mild pulmonary hypertension, top-normal PVR and normal pulmonary capillary wedge pressure. The cardiac index was normal, seen below.     On 2022, [place] [facility] [place]. EKG: NSR without ischemic changes. hsTrop: 12->16, d-dimer: 0.56, CXR showed cardiomegaly and diffuse interstitial prominence.     Dr. Nguyễn ordered echocardiogram, which was completed on 2022 and showed estimated RVSP = 50-55 mmHg, normal-sized LV with normal LV systolic function. Estimated EF 55- 60%. Wall motion grossly normal, mild concentric left ventricular hypertrophy, grade I LV diastolic dysfunction, probably normal RV size and function.    At her initial visit on 10/11/2022, she reported that she felt very short of breath with minimal activity most of the time. She reports that this started about 1 year prior and had progressively gotten worse. She described PND and bendopnea. She reported that she experienced ankle swelling, worsened over the past year and usually worse towards the end of the night. She also reported some swelling in her abdomen. She reported daily palpations. I recommended a RHC which was done  11/28/2022 and showed: Normal right sided filling pressures. Mildly elevated left sided filling pressures. Precapillary pulmonary hypertension with mean PA 36 mmHg.    She was hospitalized on 3/19/2023 at [facility] for PE diagnosed on V/Q scan. She reports that she had presented with left arm pain and heaviness. She states that she was started on Apixaban. One week after her last office visit (4/6/2023), [place] [facility] [place], epistaxis and hemoptysis. Echocardiogram showed: left ventricular cavity size normal with mild left ventricular hypertrophy and preserved systolic function. Estimated EF 65%. Chest CTA showed no evidence of PE; Apixaban was discontinued.     She had a repeat echocardiogram (6/2023) which showed: Normal left ventricular cavity size and mild concentric left ventricular hypertrophy. Normal systolic function. EF: 60%. Normal right ventricular structure and function. Mild tricuspid valve regurgitation with estimated RVSP: 55 mmHg. Compared to previous study from 4/14/2023, estimated right ventricular systolic pressure were higher.    She underwent V/Q scan in September 2023, which showed intermediate-to-high probability of pulmonary embolic disease, as a result she completed CTA Chest PE which showed: no evidence for filling defect or vessel cutoff to suggest pulmonary embolism. Enlargement of the pulmonary arteries compatible with pulmonary arterial hypertension was seen.    She reports she started Macitentan around August or September 2023. She stated that she had not been checking her SpO2. She reports she did not notice any difference since starting it. She reported she had been feeling more shortness of breath at times with ADLs.     [place] [facility] [place] with chest pressure, palpitations and nausea. She was noted to have hypoxia and tachypnea at presentation. She was found to have moderate-to-large pericardial effusion without tamponade. She was  started on Colchicine 0.6 mg BID on 3/4/2024 and when she did not improve symptomatically, Prednisone 20 mg daily was added on 3/5/2024. On 3/7/2024, she developed multiple episodes of diarrhea and Colchicine dose was decreased to 0.3 mg. Coronary CTA, done as part of the ischemic workup, showed moderate 2 vessel CAD. She was recommended to begin Atorvastatin 20 mg daily and Clopidogrel 75 mg daily. She initially refused those two medications, but eventually agreed to take Atorvastatin.      She was discharged on Colchicine 0.3 mg for at least 3 months and Prednisone 20 mg for a total two week course until 3/19/2024, then decrease the dose to 10 mg daily for 2 weeks until 4/2/2024, then decrease dose to 5 mg daily for 2 weeks until 4/16/2024. She reports she tapered off Prednisone as instructed and has continued to take Colchicine as prescribed. She completed an echocardiogram (4/10/2024) which showed: Small-to-moderate sized, circumferential pericardial effusion. No echocardiographic evidence for cardiac tamponade. Compared to previous study from 3/4/2024, no significant change. Pericardial effusion size was similar, estimated right atrial pressure lower.      She presented to [facility] (4/16/2024) with increased pleuritic chest pain. On presentation to the ER, she was noted to be hypertensive and tachycardic. Labs showed mildly elevated troponin, elevated BNP and leukocytosis. CT angiography of the chest did not show evidence of PE. It was read as large pericardial effusion, evidence of emphysema, evidence of pulmonary hypertension, 16 mm left lobe lung mass.       She presented again to [facility] on 5/21/2024 at the request of her Rheumatologist. During her visit there, she was noted to be tachycardic and with some shortness of breath. Her chest pain was improved from her prior admission. On arrival, O2 saturations were normal. ECG showed: Sinus Tachycardia (HR: 125 bpm). BNP >300, hsTrop 32 ->24. POCUS in the emergency  "department showed evidence of pericardial effusion with no tamponade physiology. CTA Chest did not show evidence of pulmonary embolism. There were bilateral changes radiographically concerning for volume overload. She received Methylpredinsolone 125 mg in ED. Of note, she was on Prednisone 10 mg daily at home. She was given IV diuresis. She was discharged on Furosemide 20 mg PO PRN daily for fluid weight gain / swelling.     She had brief hospitalization and was discharged on 7/23 for chest pain.       She now presents back to the [facility] ED with worsened left flank and left chest pain. She is seen and examined lying in ED stretcher with daughter at bedside. She states that chest pain does not change with rest or exertion. She reports that inspiration and some positions can make it worse. She reports that she also has mildly increased shortness of breath above her baseline. She denies light-headedness. She describes intermittent palpitations.     ---    Rheumatology: Dr. Trevizo  Pulmonology: Dr. Wasserman    Past Medical / Surgical History:  Pulmonary HTN, HTN, Raynaud's Disease, Cutaneous Systemic Scleroderma (dx 1998), ILD, PE (3/2023), Follicular Carcinoma of Thyroid, Tenosynovitis, de Quervain, h/o Hernia Repair, h/o Appendicitis, h/o Digit Amputation, H/o Total Thyroidectomy (Dr. Pacheco at [facility]; 4/2013)    Family & Social History: She is , she has two children. She has worked as an .     Tobacco: former 2005  EtOH: never   Illicits: never     Her brother has CAD with stent  1st cousin with SLE  Both parents had \"heart problems\"    Allergies:   Allergies   Allergen Reactions    Aspirin Shortness of breath     Other reaction(s): Unknown  \"stop breathing\"      Ibuprofen Shortness of breath     Stop breathing    Iodine Shortness of breath     Other reaction(s): Unknown    Iodine And Iodide Containing Products Shortness of breath     ? rash    Morphine Shortness of breath      Reported wamrth of face, " improved with benadryl and cold compresses after getting morphine as well as episode of (subjective ) dyspnea, and thought she passed out - did have wamrth and erythema of face with mild edema R>L cheek notably on exam.     Sulfa (Sulfonamide Antibiotics) Angioedema    Clindamycin     Hydrochlorothiazide      Other reaction(s): Abdominal Pain   Slow heart rate    Oxycodone      Other reaction(s): Vomiting    Sulfamethoxazole-Trimethoprim      Other reaction(s): Vomiting, Weakness     Latex Rash       Medications:   No current facility-administered medications for this encounter.     Current Outpatient Medications   Medication Sig Dispense Refill    amLODIPine (NORVASC) 5 mg tablet Take 10 mg by mouth every morning.      atorvastatin (LIPITOR) 40 mg tablet Take 1 tablet (40 mg total) by mouth daily. 30 tablet 2    benzonatate (TESSALON) 100 mg capsule Take 100 mg by mouth 3 (three) times a day as needed for cough.      biotin 1 mg tablet Take 1,000 mcg by mouth daily.      cholecalciferol, vitamin D3, 1,000 unit (25 mcg) tablet Take 1,000 Units by mouth daily.      collagenase (SantyL) ointment Apply 1 Application topically daily.      cyanocobalamin, vitamin B-12, 1,000 mcg capsule Take 1,000 mcg by mouth daily.      docusate sodium (COLACE) 100 mg capsule Take 100 mg by mouth daily as needed for constipation.      ethacrynic acid (EDECRIN) 25 mg tablet Take 1 tablet (25 mg total) by mouth daily. 90 tablet 1    levothyroxine (SYNTHROID) 100 mcg tablet Take 100 mcg by mouth daily. BRAND ONLY      lidocaine (ASPERCREME) 4 % adhesive patch,medicated topical patch Apply 1 patch topically daily as needed (pain). Remove & discard patch within 12 hours or as directed by prescriber. 15 patch 0    loperamide (IMODIUM) 2 mg capsule Take 2 mg by mouth every 8 (eight) hours as needed for diarrhea.      meclizine (ANTIVERT) 25 mg tablet Take 25 mg by mouth daily as needed.      MEDIHONEY, HONEY, TOP Apply topically daily.       mupirocin (BACTROBAN) 2 % ointment Apply 1 application. topically daily. Behind ears      OPSUMIT 10 mg tablet tablet Take 1 tablet (10 mg total) by mouth daily. 30 tablet 0    pantoprazole (PROTONIX) 20 mg EC tablet Take 20 mg by mouth daily.      sildenafiL, pulm.hypertension, (REVATIO) 20 mg tablet Take 1 tablet (20 mg total) by mouth 3 (three) times a day. 270 tablet 3    sodium chloride (OCEAN) 0.65 % nasal spray Administer 2 sprays into each nostril daily.      tocilizumab (ACTEMRA) 200 mg/10 mL (20 mg/mL) solution Infuse 8 mg/kg into a venous catheter every 28 (twentyeight) days.         Review of Systems:   All other systems reviewed and are negative except as per HPI.    Physical Exam:     Wt Readings from Last 10 Encounters:   07/20/24 49.5 kg (109 lb 2 oz)   07/19/24 51.3 kg (113 lb)   06/20/24 51.7 kg (114 lb)   06/14/24 52.6 kg (116 lb)   05/23/24 52.4 kg (115 lb 9.6 oz)   04/17/24 53.5 kg (118 lb)   04/10/24 44.5 kg (98 lb)   03/16/24 44.6 kg (98 lb 5.2 oz)   02/01/24 47.6 kg (105 lb)   07/25/23 50.8 kg (112 lb)       BP Readings from Last 5 Encounters:   07/25/24 (!) 155/77   07/23/24 (!) 101/57   07/19/24 (!) 154/80   06/22/24 (!) 155/74   06/20/24 130/72       Vitals:    07/25/24 1953   BP: (!) 155/77   Pulse: (!) 132   Resp: 18   Temp: 36.6 °C (97.8 °F)   SpO2: 95%       Constitutional: NAD, AAOx3  HENT: Normocephalic, atraumatic head, sclerae anicteric, no cervical lymphadenopathy, trachea midline  Cardiovascular: RRR, no murmurs, rubs or gallops, JVP: 7-8 cm H2O   cm H2O, no carotid bruits.  Respiratory: CTA bilateral lung fields, no wheezes, rales or rhonchi  GI: soft, non-tender/non-distended  Musculoskeletal / Extremities: trace peripheral edema, +2 pulses bilateral radial, DP/PT arteries, skin tightening on face and fingertips  Skin: no rashes  Neuro / Psych: no focal deficits, CNII-XII grossly intact, appropriate, cooperative    Diagnostic Data:     Results from last 7 days   Lab Units  07/25/24  1420 07/20/24  0415 07/19/24  1432   SODIUM mEQ/L 139 142 143   POTASSIUM mEQ/L 4.3 4.2 4.0   CHLORIDE mEQ/L 103 107 110*   CO2 mEQ/L 27 25 23   BUN mg/dL 17 17 15   CREATININE mg/dL 1.1 1.2 1.0   CALCIUM mg/dL 10.6* 9.3 9.7   ALBUMIN g/dL 4.3  --  4.1   BILIRUBIN TOTAL mg/dL 0.4  --  0.4   ALK PHOS IU/L 82  --  69   ALT IU/L 8  --  7   AST IU/L 15  --  14   GLUCOSE mg/dL 86 85 93     Results from last 7 days   Lab Units 07/25/24  1420 07/20/24  0414 07/19/24  1432   WBC K/uL 10.29 7.27 9.78   HEMOGLOBIN g/dL 11.2* 9.8* 10.9*   HEMATOCRIT % 37.3 32.0* 35.9   MCV fL 80.4* 80.4* 80.5*   PLATELETS K/uL 331 206 212     Component      Latest Ref [facility] 7/19/2024 7/25/2024   High Sens Troponin I      <15.0 pg/mL 89.2 (HH)  66.8 (HH)    High Sens Troponin I       90.3 (HH)  83.4 (HH)       Component      Latest Ref [facility] 5/22/2024 6/22/2024 7/19/2024 7/25/2024   BNP      <=100 pg/mL 352 (H)  150 (H)  151 (H)  220 (H)       Component      Latest Ref [facility] 4/16/2024 5/23/2024 7/21/2024 7/22/2024   Sed Rate      0 - 30 mm/hr 101 (H)  79 (H)  105 (H)  >119 (H)       Component      Latest Ref [facility] 4/18/2024 5/24/2024   WBC      3.80 - 10.50 K/uL 8.40  16.22 (H)    RBC      3.93 - 5.22 M/uL 3.54 (L)  3.82 (L)    Hemoglobin      11.8 - 15.7 g/dL 9.8 (L)  10.3 (L)    Hematocrit      35.0 - 45.0 % 32.8 (L)  33.9 (L)    Platelets      150 - 369 K/uL 254  289       Component      Latest Ref Rng 5/24/2024   Magnesium      1.8 - 2.5 mg/dL 2.1      Component      Latest Ref Rng 4/13/2023 3/7/2024 5/22/2024   TSH      0.34 - 5.60 mIU/L 4.71  0.28 (L)  1.03      Component      Latest Ref Rng 5/24/2024   Sodium      136 - 145 mEQ/L 138    Potassium, Bld      3.5 - 5.1 mEQ/L 4.4    Chloride      98 - 107 mEQ/L 104    CO2      21 - 31 mEQ/L 24    BUN      7 - 25 mg/dL 40 (H)    Creatinine      0.6 - 1.2 mg/dL 1.1    Glucose      70 - 99 mg/dL 87    Calcium      8.6 - 10.3 mg/dL 9.5    eGFR      >=60.0 mL/min/1.73m*2 56.6 (L)    Anion Gap       3 - 15 mEQ/L 10      Component      Latest Ref Rng 4/13/2023   Hemoglobin A1C      <5.7 % 4.4        Component      Latest Ref Rng 4/14/2023   Triglycerides      30 - 149 mg/dL 44    Cholesterol      <=200 mg/dL 194    HDL      >=55 mg/dL 49 (L)    LDL Calculated      <=100 mg/dL 136 (H)    Non-HDL, Calculated      mg/dL 145    RISK      <=5.0  4.0       Component      Latest Ref Rng 4/13/2023   High Sens Troponin I      <15.0 pg/mL 23.0 (H)       Component      Latest Ref Rng 4/14/2023   Ferritin      11 - 250 ng/mL 75      Component      Latest Ref Rng 4/14/2023   Iron      35 - 150 ug/dL 29 (L)    TIBC      270 - 460 ug/dL 245 (L)    UIBC      180 - 360 ug/dL 216    Iron Saturation      15 - 45 % 12 (L)        Component      Latest Ref Rng 6/27/2022 4/13/2023   BNP      <=100 pg/mL 111 (H)  105 (H)                          ---    ECG (7/25/2024, personally reviewed):   Sinus tachycardia  LVH  Lateral T wave abnormality   HR: 135 bpm     ---    CT Chest / Abdomen / Pelvis w/ Contrast (7/25/2024, personally reviewed):     Lung bases: Left-sided pleural effusion, left parenchymal consolidation, and  findings of interstitial lung disease.  More nodular density in the medial  aspect of the left lower lobe measuring 1.6 x 1.9 cm.  Cardiomegaly with a  pericardial effusion.     Liver: The liver is normal in size.  There is no focal mass.  Hepatic veins and  portal veins are patent without focal filling defects to suggest thrombosis..     Gallbladder:  Small dependent gallstones.  No gallbladder wall thickening..     Spleen: Spleen is normal in size without focal mass lesion..     Pancreas: The pancreas is normal without focal mass, parenchymal atrophy, or  pancreatic duct dilation..     Adrenals: The adrenals are normal bilaterally without focal mass..     Kidneys, ureters and bladder:  Symmetric enhancement and excretion..  Left lower  pole cystic lesion measures 3.2 cm in maximal dimension.  This measures  greater  than simple fluid density.  This measured 60 Hounsfield units on the noncontrast  CT from 2020.  It measures 36 Hounsfield units on today's study.  Therefore it  is most in keeping with a hemorrhagic/proteinaceous cyst.  Additional  hypodensities too small to characterize.     Retroperitoneal structures: There is no abdominal aortic aneurysm.  Atherosclerotic calcification.  There is no retroperitoneal adenopathy or  retroperitoneal mass..     Bowel and mesentery: No evidence of a focal inflammatory or obstructive process.  Moderate fecal retention throughout the colon.  There are a few fluid-filled  small bowel loops in the pelvis, nonspecific.     Lymph nodes: No pathologic lymphadenopathy..     Pelvis: The uterus is normal in size.  The ovaries are normal.  There is no  adnexal mass or pelvic free fluid.     Bones: Within normal limits.    No evidence for pulmonary embolism.  Persistent left-sided pleural effusion and airspace disease at the left lung  base.  Underlying interstitial lung disease.     TTE (7/19/2024, personally reviewed):   Mild increased wall thickness with normal left ventricular cavity and preserved systolic function. EF 65%. No wall motion abnormalities.   Normal right ventricular size with preserved systolic function.   IVC normal in size with >50% respiratory variation consistent with normal right sided filling pressures.   Small pericardial effusion. No evidence of hemodynamic compromise with normal respiratory variation and no chamber collapse. Prominent epicardial fat.   Compared with previous study of 5/23/2024, small pericardial effusion persists.      PET/CT Skull-to-Thigh (6/4/2024):   An approximately 1.6 cm pulmonary nodule at the medial left lung base is  significantly FDG avid at max SUV 8.5.  This is nonspecific and can be seen in  the setting of benign pathology however malignancy is a concern.     There is more mild to moderate nonspecific uptake localizing to  lita-fissural  nodules particularly on the left.     There is  moderate nonspecific left hilar and mediastinal dawson uptake.     No additional suspicious FDG avid findings appreciated.     CTA Chest (5/21/2024):   IMPRESSION:  1.  No evidence of acute pulmonary embolism.  2.  Multiple new perifissural left lower lobe nodules, suspicious for a  neoplastic process. PET/CT and or tissue sampling is recommended.  3.  Chronic interstitial lung disease in an NSIP pattern, with overall slightly  progressed appearance since March 2023. Pulmonary consultation is recommended.  4.  Stable cardiomegaly and large pericardial effusion.  5.  Mediastinal and supraclavicular adenopathy, similar to prior study, also  indeterminate, which could be further evaluated at time of PET/CT.     CTA Chest (4/16/2024):   IMPRESSION:  1. No evidence of pulmonary embolism.  2. Stable cardiomegaly and large pericardial effusion. Cardiology consultation  suggested.  3. Emphysema with bilateral lower lobe groundglass opacity and bronchiectasis in  an NSIP pattern, which can be seen with scleroderma. Pulmonary consultation  suggested.  4. Stable 16 mm left lower lobe masslike density which may represent  atelectasis, lung cancer or lymphoma. When clinically appropriate PET/CT  suggested.  5. Stable prominence of the main pulmonary artery which can be seen with  pulmonary hypertension     TTE (5/23/2024, personally reviewed):  Mild increased wall thickness with normal left ventricular cavity and preserved systolic function. EF 65%. No wall motion abnormalities.   Normal right ventricular size with preserved systolic function.   Tiny IVC with >50% respiratory variation consistent with low-normal right sided filling pressures.   Small pericardial effusion, predominantly adjacent to right atrium. No evidence of hemodynamic compromise with normal respiratory variation and no chamber collapse. Prominent epicardial fat.   Compared with previous study of  4/10/24, small pericardial effusion persists.      TTE (4/10/2024, personally reviewed):  1. Normal left ventricular cavity size with mild concentric left ventricular hypertrophy. Normal systolic function. EF: 60%. No regional wall motion abnormalities. Cannot determine diastolic function. Global longitudinal strain not reported due to technical limitations of study.   2. Aortic valve has three cusps. Trace aortic regurgitation. Aortic valve and root sclerosis.  3. Thickened mitral valve leaflets. Trace mitral valve regurgitation. Normal left atrial size.   4. Normal right ventricular structure and function. Trace tricuspid valve regurgitation with estimated RVSP: 51 mmHg (assuming right atrial pressure of 3 mmHg). Normal right atrial size. Pulmonic valve structurally normal. Trace pulmonic valve regurgitation. Pulsed wave Doppler of the RVOT systolic profile shows decreased acceleration times and geometry consistent with mildly elevated PVR.  5. IVC size is normal with > 50% inspiratory collapse consistent with normal right atrial pressure. Small-to-moderate sized, circumferential pericardial effusion. No echocardiographic evidence for cardiac tamponade.   6. Compared to previous study from 3/4/2024, no significant change. Pericardial effusion size is similar, estimated right atrial pressure lower.           CTA Abd / Pelvis (3/13/2024, personally reviewed):   There is dilatation of the of proximal/mid small bowel with relatively abrupt  transition left hemiabdomen, likely obstruction. Questionable lesion at the  transition point. This could be better evaluated with CT or MRI enterography.     Aorta and major branches patent. Superior mesenteric artery patent without  significant stenosis.  Suspect moderate stenosis of the origins of the of renal arteries bilaterally.     Large pericardial effusion, no change.     Patchy opacities lower lungs, similar to prior.      Coronary CTA (3/11/2024):   1. Two-vessel  coronary artery disease as above that is moderate in severity.  CT  FFR is normal..  2. There is mitral annular calcification and aortic sclerosis.  The right atrium  is enlarged.  There is left ventricular hypertrophy.  There is a moderate to  large circumferential pericardial effusion.  3. Normal left ventricular wall motion and systolic function.  4. Coronary calcium score 313, 96th percentile     TTE (3/4/2024, personally reviewed):   Normal-sized left ventricle. Mild left ventricular hypertrophy. Preserved systolic function. LVEF 60%. No regional wall motion abnormalities. Grade II diastolic dysfunction.  Normal global longitudinal strain (-20%).  The aortic valve is not well seen.  Cannot determine the number of cusps.  Sclerotic leaflets.  No aortic stenosis.  Trace aortic insufficiency.  Normal sized aortic root.  The visualized portion of the ascending aorta is normal in size.  The mitral valve leaflets are thickened. Mild mitral annular calcification. Trace mitral regurgitation.   Mildly dilated left atrium.  Normal-sized right ventricle. Normal right ventricular systolic function.  Thickened tricuspid valve. There is mild tricuspid regurgitation with estimated RVSP of 46 mmHg.   Pulmonic valve is not well visualized. Trace pulmonic regurgitation.   Mildly dilated right atrium.  IVC is enlarged with <50% respiratory variation consistent with elevated right atrial filling pressure (at least 15 mmHg).   Moderate, circumferential, circumferential pericardial effusion.  The largest pocket is located inferolaterally.  Elevated right atrial pressure.  No other clear echocardiographic features of increased pericardial pressure.  Correlate clinically.   Compared to previous TTE from 6/21/2023, pericardial effusion now moderate in size.  Right atrial pressure now elevated.         CTA Chest (3/3/2024):   IMPRESSION:  1.  No evidence of acute pulmonary embolism to the level of the segmental  branches.  2.  Moderate  to large pericardial effusion measuring higher than simple fluid  density.  3.  Lower lobe lung field predominant interstitial thickening with some relative  subpleural sparing, consistent with a chronic interstitial lung disease,  unchanged from the prior study.  4.  Continued bilateral axillary, mediastinal, and bilateral hilar  lymphadenopathy, not definitely changed from the prior study, possibly related  to the patient's sarcoid.  5.  Emphysema.      CTA Chest PE (10/4/2023):  IMPRESSION:  1. No evidence for filling defect or vessel cutoff to suggest pulmonary  embolism.  Enlargement of the pulmonary arteries compatible with pulmonary  arterial hypertension.  2. Changes throughout the lungs as described above which can be seen with  systemic sclerosis/scleroderma.  Underlying pneumonia the lung bases cannot be  entirely excluded in the proper clinical setting.  3.  Additional stable findings as described above.        VQ (9/12/2023):  IMPRESSION:  Intermediate to high probability of pulmonary embolic disease.     6MWT (7/25/2023, on Sildenafil 20 mg TID):        TTE (6/21/2023, personally reviewed):  1. Normal left ventricular cavity size and mild concentric left ventricular hypertrophy. Normal systolic function. EF: 60%. No regional wall motion abnormalities. Grade I diastolic dysfunction.   2. Aortic valve cusps not well visualized. Trace aortic regurgitation. Aortic valve and root sclerosis.  3. Thickened mitral valve leaflets. Mild mitral annular calcification. Trace mitral regurgitation. Mildly dilated left atrial size.   4. Normal right ventricular structure and function. Mild tricuspid valve regurgitation with estimated RVSP: 55 mmHg (assuming right atrial pressure of 3 mmHg). Normal right atrial size. Pulmonic valve not well visualized. Trace pulmonic valve regurgitation. Pulsed wave Doppler of the RVOT systolic profile show decreased acceleration times ( msec) and late systolic notching  consistent with elevated PVR.   5. IVC size is normal with > 50% inspiratory collapse consistent with normal right atrial pressure. Small pericardial effusion without echocardiographic evidence of tamponade.  6. Compared to previous study from 4/14/2023, estimated right ventricular systolic pressure is higher.        TTE (4/14/2023, personally reviewed):   The left ventricular cavity size is normal with mild left ventricular hypertrophy and preserved systolic function. Estimated EF 65%. No regional wall motion abnormalities. Grade I diastolic dysfunction.     The aortic valve is tricuspid. Aortic valve sclerosis. Trace aortic regurgitation.      The visualized portions of the aortic root and ascending aorta are normal in size.     The mitral valve is mildly thickened. Mild mitral annular calcification. Trace mitral regurgitation.       The left atrium is severely dilated.      The right ventricle is normal in size with preserved systolic function     The pulmonic valve is not well seen.     The tricuspid valve is normal in appearance. mild tricuspid regurgitation with an estimated RVSP of 34 mmHg.       Right atrium is normal in size.     The IVC is small and collapses > 50% with inspiration consistent with normal right atrial pressures.     Small pericardial effusion, mostly posterior.       Compared to previous echocardiogram from 8/22/2022, there is no significant change        TTE (11/28/2022, personally reviewed):   Normal right- and mildly elevated left-sided filling pressures (RA: 3 mmHg, PCWP: 13 mmHg)  Elevated pulmonary artery pressures (60/22(35 mmHg)) in the setting of PCWP: 13 mmHg.   Normal cardiac output and index (CO: 5.1 L/min, CI: 3.2 L/min/m2)  PVR: 4.3 Wood units. SVR: 1840 dynes/sec/cm5      TTE (8/22/2022, personally reviewed):  Normal-sized LV. Normal LV systolic function. Estimated EF 55- 60%. Wall motion appears grossly normal. Mild concentric left ventricular hypertrophy. Grade I LV  diastolic dysfunction.  Pulmonic valve not well visualized. Grossly normal pulmonic valve structure. Trace pulmonic valve regurgitation. No pulmonic valve stenosis.  Aortic valve not well visualized. Aortic valve not well seen but likely trileaflet. Focal aortic valve calcification present. Sclerotic aortic valve leaflets. Mild aortic valve regurgitation. No aortic valve stenosis.  RV not well visualized. Normal-sized RV. Normal RV systolic function.  Normal-sized RA.  There is a small loculated pericardial effusion anterior to the right atrium. No cardiac tamponade.  Sclerotic mitral valve. Mitral valve calcification of the anterior leaflet present.  Trace mitral valve regurgitation.  No significant mitral valve stenosis.  Normal-sized IVC. IVC demonstrates normal respiratory collapse.  Tricuspid valve structure is grossly normal. Mild to moderate tricuspid valve regurgitation.  Estimated RVSP = 50-55 mmHg.  Mildly dilated LA.  No previous echocardiogram available for comparison.     TTE (4/21/2022, outside study):  This result has an attachment that is not available.     A complete transthoracic echocardiogram (including 2D, color flow   Doppler, spectral Doppler and M-mode imaging) was performed using the   standard protocol. The study quality was adequate.     The left ventricle is normal in size. There is moderately increased   wall thickness consistent with moderate concentric hypertrophy.   Endocardium is incompletely visualized, but no regional wall motion   abnormalities are noted in the visualized segments. Normal left   ventricular ejection fraction. The left ventricular ejection fraction by   visual estimate is 55% to 60%.     The right ventricle is normal in size. There is normal function of the   right ventricle.     The left atrium is normal in size. Left atrial volume is 58 mL.     The right atrial volume measures 52 mL. The right atrial volume index   measures 34 mL/m2.     There is trace mitral  regurgitation. There is no mitral stenosis.     The aortic valve is trileaflet. There is mild aortic valve thickening.   There is no aortic stenosis. There is trace aortic regurgitation.     There is mild to moderate tricuspid regurgitation. Pulmonary artery   systolic pressure measures 52 mmHg. The pulmonary artery systolic pressure   is moderately elevated.     The aorta measures 3.2 cm at the sinus of Valsalva. The proximal   segment of the ascending aorta is mildly enlarged. The proximal segment of   the ascending aorta measures 3.4 cm. The proximal segment of the ascending   aorta measures 2.2 cm/m2, indexed for body surface area.     Trivial pericardial effusion present. There is no echocardiographic   evidence of cardiac tamponade.     The inferior vena cava is normal in size (diameter <21 mm) and   decreases >50% in size with inspiration, suggesting a normal right atrial   pressure of 3 mmHg (range 0-5 mm Hg).     Compared to the prior study of 3/13/2020, there are no significant   changes.        PFT (3/23/2022):      PFT (7/14/2021):      CT Chest (5/2/2021, outside study):  CLINICAL INFORMATION: Systemic sclerosis. Interstitial lung disease. Groundglass nodule     PROCEDURE: Unenhanced CT of the chest was performed using thin section reconstructions. Images were obtained on inspiration and expiration.     COMPARISON: June and August 2020     FINDINGS:     LUNGS, PLEURA:     Groundglass opacities with reticulation with subpleural sparing in the lower lobes, without honeycombing or architectural distortion, unchanged.     Right upper lobe groundglass nodule, image 114 of series 3, 8 x 11 mm, previously 6 mm.     Expiratory images are of diagnostic quality and do not demonstrate evidence of clinically significant air trapping.     CARDIOVASCULAR, MEDIASTINUM, THYROID: Coronary calcifications. Trace pericardial effusion.     LYMPH NODES: Subcentimeter mediastinal lymph nodes, unchanged. Unchanged axillary  lymph nodes.     SKELETON, CHEST WALL: No destructive bone lesion     UPPER ABDOMEN: Patulous esophagus. 3 mm right renal stone.       RHC (9/02/2020):  RA   8 mmHg   RV   40/5 mmHg   PA (mean)  44/16 (25) mmHg   PCWP   12 mmHg   CO   4.65 lpm (Thermodilution)   CI   2.65 lpm/m-squared   SVI    33 ml/beat/m-squared (lower limit normal 29)   PVR   2.8 mmHg/l/min (Wood units)   SVR   2064 dyne-sec-cm-5         PFT (3/18/2020):      TTE (3/13/2020, outside study):  · The study quality was good.   · The left ventricle is normal in size. Top normal left ventricular wall   thickness. There are no segmental wall motion abnormalities. The left   ventricular ejection fraction is 55-60% by visual estimate and 3D   echocardiography. Normal left ventricular ejection fraction.   · There is grade I (mild) LV diastolic dysfunction.   · The right ventricle is normal in size. There is normal function of the   right ventricle.   · The right atrium is mildly dilated.   · There is mild mitral valve anterior leaflet thickening. There is mild   mitral valve leaflet calcification. There is flattening of the mitral   leaflets without raphael prolapse. There is trace mitral regurgitation.   · The aortic valve is trileaflet. There is mild thickening of the aortic   valve. There is mild calcification of the aortic valve. There is no aortic   /stenosis. There is trace aortic regurgitation.   · There is mild tricuspid valve leaflet thickening. There is mild to   moderate tricuspid regurgitation. The pulmonary artery systolic pressure   is moderately elevated. Pulmonary artery systolic pressure measures 50   mmHg. There is mid-systolic notching of the RVOT VTI consistent with   elevated pulmonary vascular resistance.   · The inferior vena cava is normal in size (diameter <21 mm) and decreases   >50% in size with inspiration, suggesting a normal right atrial pressure   of 3 mmHg (range 0-5 mm Hg).   · Trivial loculated pericardial effusion present  adjacent to the right   ventricle and adjacent to the right atrium.          Assessment / Plan:     1. Chest Pain:  - Given pattern and character, I believe this is musculoskeletal / serous pain from her autoimmune disease. There may be an element of myopericarditis.  - She reports that pain has not responded to increased in steroids in the past   - Non-ACS chest pain and known non-obstructive CAD as recently as March 2024 CCTA.     2. Pulmonary Hypertension (WHO Group I, NYHA/WHO FC III):   - Occurring in the background of Systemic Scleroderma with ILD. August 2022 TTE showed normal RV size and function, but progressive exertional decline, worsened DLCO and resting tachycardia suggest there may be progression of her pulmonary vascular disease and limitation of cardiac output. This was proven with 11/2022 RHC showing mean PA 35 mmHg (with PCWP 13 mmHg) and PVR 4.3 Wood units. Recent TTEs have shown appropriate RV:PA coupling with normal RV size and systolic function.   - She is mildly hypervolemic on exam  - Would give Ethacrynic Acid IV 25 mg x1 here and continue PO as outpatient when this can be picked up from pharmacy  - Continue Sildenafil 20 mg TID and Macitentan 10 mg daily for combination PH therapy.     3. HTN:   - Elevated in the setting of pain  - Continue Amlodipine.     4. Systemic Scleroderma / Raynaud's Disease:   - Per Rheumatology (Dr. Trevizo).      5. ILD:   - Per Pulmonology and Rheumatology (Dr. Trevizo). She has not tolerated trials of Mycophenolate. She briefly received Tocilizumab.     6. PE:   - She has had elevated probability on V/Q scans, but CTA Chest has consistently not shown evidence for acute or chronic thromboembolism. Apixaban has been discontinued.     7. CAD:   - Two-vessel non-obstructive disease on March 2024 CCTA. She is continued on Atorvastatin 40 mg daily.     8. Pre-procedural Cardiovascular Evaluation and Risk Stratification: She is proposed for biopsy of FDG-avid LLL lung  "mass with Dr. Rodriguez. There is recent CCTA showing that she does not have obstructive CAD. Her symptoms are at baseline, and she is compensated on exam. She can proceed without further cardiovascular testing at elevated, but acceptable risk. She should continue uninterrupted Sildenafil and Macitentan liat-procedurally. Would plan to administer mid-day Sildenafil dose when she is due for it.     Lita-procedurally:     If vasopressors are needed, recommend \"RV-friendly\" vasopressor strategy to preferentially raise SVR more than PVR (e.g., Vasopressin > Norepinephrine = Epinephrine > Dopamine >>> Phenylephrine, Ephedrine).  Judicious use of IV fluids, if needed.    Would avoid negative inotropic agents such as Propofol, if this can be accomplished.   Avoid bradycardia, especially lita-intubation, if this is needed      Timothy Arvizu, DO  "

## 2024-07-26 NOTE — TELEPHONE ENCOUNTER
Prior Auth Request - Medication     Name of caller: Arielle    Relationship to patient: self     Name of patient: Arielle Hammondstower    Name of physician: Timothy Arvizu DO    Medication name: ethacrynic acid (EDECRIN) 25 mg tablet [[id number]]     Rx BIN number: [id number]    Rx PCN number: [id number]    Rx Group number: [id number]    Pharmacy information:   [id number] - [place] - [address] AT [facility] [address]  [address] [address]  Phone: [phone]  Fax: [phone]       Name of insurance and phone number: [facility]    Insurance ID#: [id number]

## 2024-07-26 NOTE — TELEPHONE ENCOUNTER
Ta Prior authorization started-- sent to plan along with supporting documentation.    CaseId:[id number];Status:Approved;Review Type:Prior Auth;Coverage Start Date:06/26/2024;Coverage End Date:07/26/2025;

## 2024-08-05 ENCOUNTER — HOSPITAL ENCOUNTER (EMERGENCY)
Facility: HOSPITAL | Age: 64
Discharge: HOME | End: 2024-08-05
Attending: EMERGENCY MEDICINE
Payer: COMMERCIAL

## 2024-08-05 ENCOUNTER — APPOINTMENT (EMERGENCY)
Dept: RADIOLOGY | Facility: HOSPITAL | Age: 64
End: 2024-08-05
Payer: COMMERCIAL

## 2024-08-05 ENCOUNTER — PRE-ADMISSION TESTING (OUTPATIENT)
Dept: PREADMISSION TESTING | Age: 64
End: 2024-08-05
Payer: COMMERCIAL

## 2024-08-05 VITALS
WEIGHT: 109 LBS | BODY MASS INDEX: 17.52 KG/M2 | OXYGEN SATURATION: 99 % | DIASTOLIC BLOOD PRESSURE: 75 MMHG | HEIGHT: 66 IN | HEART RATE: 108 BPM | SYSTOLIC BLOOD PRESSURE: 148 MMHG | TEMPERATURE: 97.8 F | RESPIRATION RATE: 20 BRPM

## 2024-08-05 VITALS — BODY MASS INDEX: 17.52 KG/M2 | HEIGHT: 66 IN | WEIGHT: 109 LBS

## 2024-08-05 DIAGNOSIS — R07.9 CHEST PAIN, UNSPECIFIED TYPE: ICD-10-CM

## 2024-08-05 DIAGNOSIS — R22.0 FACIAL SWELLING: Primary | ICD-10-CM

## 2024-08-05 PROCEDURE — 93005 ELECTROCARDIOGRAM TRACING: CPT | Performed by: EMERGENCY MEDICINE

## 2024-08-05 PROCEDURE — 63700000 HC SELF-ADMINISTRABLE DRUG: Performed by: EMERGENCY MEDICINE

## 2024-08-05 PROCEDURE — 93005 ELECTROCARDIOGRAM TRACING: CPT

## 2024-08-05 PROCEDURE — 99283 EMERGENCY DEPT VISIT LOW MDM: CPT | Mod: 25

## 2024-08-05 PROCEDURE — 71045 X-RAY EXAM CHEST 1 VIEW: CPT

## 2024-08-05 RX ORDER — LIDOCAINE 560 MG/1
1 PATCH PERCUTANEOUS; TOPICAL; TRANSDERMAL DAILY PRN
Qty: 30 PATCH | Refills: 0 | Status: SHIPPED | OUTPATIENT
Start: 2024-08-05 | End: 2024-11-30 | Stop reason: HOSPADM

## 2024-08-05 RX ORDER — LIDOCAINE 560 MG/1
1 PATCH PERCUTANEOUS; TOPICAL; TRANSDERMAL ONCE
Status: DISCONTINUED | OUTPATIENT
Start: 2024-08-05 | End: 2024-08-05 | Stop reason: HOSPADM

## 2024-08-05 RX ORDER — ACETAMINOPHEN 325 MG/1
975 TABLET ORAL ONCE
Status: COMPLETED | OUTPATIENT
Start: 2024-08-05 | End: 2024-08-05

## 2024-08-05 RX ORDER — HYDROCORTISONE 1 %
CREAM (GRAM) TOPICAL
Qty: 15 G | Refills: 0 | Status: SHIPPED | OUTPATIENT
Start: 2024-08-05 | End: 2024-11-30 | Stop reason: HOSPADM

## 2024-08-05 RX ORDER — DIPHENHYDRAMINE HCL 25 MG
25 CAPSULE ORAL ONCE
Status: COMPLETED | OUTPATIENT
Start: 2024-08-05 | End: 2024-08-05

## 2024-08-05 RX ADMIN — ACETAMINOPHEN 975 MG: 325 TABLET ORAL at 18:53

## 2024-08-05 RX ADMIN — DIPHENHYDRAMINE HYDROCHLORIDE 25 MG: 25 CAPSULE ORAL at 18:53

## 2024-08-05 RX ADMIN — LIDOCAINE 4% 1 PATCH: 40 PATCH TOPICAL at 19:28

## 2024-08-05 NOTE — PRE-PROCEDURE INSTRUCTIONS
[address] [address]    1. We will call you between 3 pm and 7 pm on August 7, 2024 to determine that arrival time for your procedure. If you do not hear by 6PM. Please call [phone] for arrival time.     2. Please report to Main Entrance near Parking lot A, walk into main lobby and report to the admission desk on the first floor on the day of your procedure.    3. Please follow the following fasting guidelines:     No solid food EIGHT HOURS prior to arrival time on day of surgery.  6 ounces of clear liquids, meaning water or PLAIN black coffee WITHOUT any milk, cream, sugar, or sweetener are permitted up to TWO HOURS prior to arrival at the hospital.    4. Please take ONLY the following medications with a sip of water on the morning of your procedure: (populate names and/or NONE)    Atorvastatin  Amlodipine  Protonix  Opsumit  Synthroid  Revatio  STOp Vitamins/supplements /NSAIDs    5. Other Instructions: You may brush your teeth the morning of the procedure. Rinse and spit, do not swallow.  Bring a list of your medications with dosages.  Use surgical wash as directed.     6. If you develop a cold, cough, fever, rash, or other symptom prior to the day of the procedure, please report it to your physician immediately.    7. If you need to cancel the procedure for any reason, please contact your physician.    8. Make arrangements to have safe transportation home accompanied by a responsible adult. If you have not arranged safe transportation home, your surgery will be cancelled. Safe transportation may include private vehicle, ride-share service, taxi and public transportation when accompanied by a responsible adult who will assist you home. A responsible adult is someone known to you and does not include the taxi, ride-share or public transit drive transporting you.    9.  If it is medically necessary for you to have a longer stay, you will be informed as soon as  the decision is made.    10. Only bring essential items to the hospital.  Do not wear or bring anything of value to the hospital including jewelry of any kind, money, or wallet. Do not wear make-up or contact lenses.  DO NOT BRING MEDICATIONS FROM HOME unless instructed to do so. DO bring your hearing aids, glasses, and a case    11. No lotion, creams, powders, or oils on skin the morning of procedure     12. Dress in comfortable clothes.    13.  If instructed, please bring a copy of your Advanced Directive (Living Will/Durable Power of ) on the day of your procedure.     14. Ensuring your safety at all times is a very important part of our [facility] Culture of Safety. After having surgery and sedation, you are at risk for falling and balance issues. Although you may feel awake, the effects of the medication can last up to 24 hours after anesthesia. If you need to use the bathroom during your recovery period, nursing staff will escort you there and stay with you to ensure your safety.    15. Refrain from drinking alcohol and smoking cigarettes for 24 hours prior to surgery.    16. Shower with antibacterial soap (Dial) the night before and morning of your procedure.  If your procedure indicates the need for CHG antiseptic wash (Bactoshield or Hibiclens), please use this instead and follow instructions as discussed at the time of your Pre-Admission Testing phone interview or visit.    Above instructions reviewed with patient and patient acknowledges understanding.    Form explained by: Liyah Najera RN

## 2024-08-05 NOTE — ED PROVIDER NOTES
Emergency Medicine Note  HPI   HISTORY OF PRESENT ILLNESS     The patient's chief complaint is facial swelling which she has had since July.  She has a history of scleroderma.  She feels that the facial swelling was due to IV morphine that she was given in the hospital.  She denies any fevers or chills.  She does report chest pain.  It is left-sided and worse with inspiration.  She was recently here with the same chest pain and had a negative CT for PE.  She has a history of interstitial lung disease and is on home oxygen.  She is not more short of breath than baseline.          Patient History   PAST HISTORY     Reviewed from Nursing Triage:       Past Medical History:   Diagnosis Date    At risk for falls     De Quervain's tenosynovitis     Essential (primary) hypertension     Follicular carcinoma of thyroid (CMS/HCC)     Gastro-esophageal reflux     Hand ulceration (CMS/HCC)     Hyperlipidemia, unspecified     Interstitial lung disease (CMS/HCC)     Leg ulcer (CMS/HCC)     Lung nodule     Lupus (CMS/HCC)     O2 dependent     On 2L    Osteomyelitis of hand (CMS/HCC)     Osteoporosis     Pulmonary hypertension (CMS/HCC)     PVD (peripheral vascular disease) (CMS/HCC)     Raynaud's disease     Severe    Reflux esophagitis     Scleroderma (CMS/HCC)     Screening mammogram for breast cancer 05/04/2021    BI-RADS category: 1 - Negative (care everywhere @ [facility])    Vertigo     Wound, open, foot     bilateral,dressing change with medihoney and mupirocin ointment by homecare nurse       Past Surgical History:   Procedure Laterality Date    CARDIAC CATHETERIZATION      COLONOSCOPY      HERNIA REPAIR      THYROIDECTOMY         Family History   Problem Relation Age of Onset    Heart disease Biological Brother         stent    Breast cancer Niece     Cervical cancer Neg Hx     Uterine cancer Neg Hx     Colon cancer Neg Hx     Ovarian cancer Neg Hx        Social History     Tobacco Use    Smoking status: Former     Types:  Cigarettes     Quit date: 9/15/2005     Years since quittin.9    Smokeless tobacco: Never    Tobacco comments:     Would smoke 1/2 of 1/2 PPD, quit in    Vaping Use    Vaping Use: Never used   Substance Use Topics    Alcohol use: Never    Drug use: Never         Review of Systems   REVIEW OF SYSTEMS     Review of Systems      VITALS     ED Vitals      Date/Time Temp Pulse Resp BP SpO2 [facility]   24 1857 -- 108 20 148/75 99 % DD   24 1741 36.6 °C (97.8 °F) 132 22 177/107 97 % AML                         Physical Exam   PHYSICAL EXAM     Physical Exam  Vitals and nursing note reviewed.   Constitutional:       Appearance: She is well-developed.   HENT:      Head: Normocephalic and atraumatic.   Eyes:      Conjunctiva/sclera: Conjunctivae normal.   Neck:      Vascular: No JVD.   Cardiovascular:      Rate and Rhythm: Regular rhythm. Tachycardia present.   Pulmonary:      Effort: Pulmonary effort is normal. No respiratory distress.      Comments: Faint crackles at the lung bases  Abdominal:      General: Bowel sounds are normal.      Palpations: Abdomen is soft.      Tenderness: There is no abdominal tenderness.   Musculoskeletal:         General: No deformity.      Cervical back: Neck supple.   Skin:     General: Skin is warm and dry.   Neurological:      Mental Status: She is alert.      Cranial Nerves: No cranial nerve deficit.   Psychiatric:      Comments: Anxious           PROCEDURES     Procedures     DATA     Results       None            Imaging Results              X-RAY CHEST 1 VIEW (Final result)  Result time 24 18:34:25      Final result                   Impression:    IMPRESSION: Cardiomegaly with continued left basilar airspace opacity and  interstitial opacities.               Narrative:    CLINICAL HISTORY: Chest pain    COMMENT: A single AP view of the chest was obtained and compared to prior study  from 2024.    The heart is enlarged.  There is mild interstitial prominence.   There is  airspace opacity at the left lung base.  There is no pneumothorax.                                      ECG 12 lead    (Results Pending)       Scoring tools                                  ED Course & MDM   MDM / ED COURSE / CLINICAL IMPRESSION / DISPO     Medical Decision Making  Problems Addressed:  Chest pain, unspecified type: chronic illness or injury with exacerbation, progression, or side effects of treatment that poses a threat to life or bodily functions  Facial swelling: chronic illness or injury with exacerbation, progression, or side effects of treatment    Amount and/or Complexity of Data Reviewed  Labs: ordered.  Radiology: ordered and independent interpretation performed.     Details: No change compared to prior  ECG/medicine tests: ordered and independent interpretation performed.     Details: No change compared to prior    Risk  OTC drugs.  Prescription drug management.  Decision regarding hospitalization.        ED Course as of 08/05/24 1912   Mon Aug 05, 2024   1852 The patient declined blood work.  Her symptoms are identical to the ones that I saw her for on July 19.  She has a biopsy scheduled for Thursday.  She has a rheumatologist that she can follow-up with.  She does not want to be on steroids.  Her facial swelling is almost certainly due to her scleroderma but she feels that it is an allergic reaction to morphine that she was given in the hospital.  She would like Benadryl for it.  Her chest pain is also identical to the chest pain that she had in July and was admitted for.  Her EKG is unchanged from baseline and her chest x-ray looks the same or improved compared to prior.  I explained to her that I would not want to give her opiates if she feels that she is allergic to morphine.  She cannot take NSAIDs.  The only option therefore is Tylenol, which she was okay with and would like a dose of here.  She has not taken any Tylenol today.  I am comfortable omitting the labs today for  "these reasons.  She understands the importance of follow-up and will return to the ER if worse in any way. [HS]   1910 The patient requested a refill of lidocaine patches which I ordered.  She also asked if she could use hydrocortisone cream on her face.  I explained that this is not typically done due to concerns for change in pigmentation that became cost.  However, given her concern about the current rash, it might be worth it to her.  She understands the risk.  I also included the following wording in her discharge instructions: \"We do not typically recommend hydrocortisone cream for the face as it can cause loss of skin pigmentation and irregular skin pigmentation.  However, at this point, it might be a risk worth taking given the persistence and severity of the rash on your face\". [HS]      ED Course User Index  [HS] Naun Prasad (Ed) MD Ramirez     Clinical Impression      Facial swelling   Chest pain, unspecified type     _________________       ED Disposition   Discharge                       Naun Prasad (Ed) MD Ramirez  08/05/24 1912    "

## 2024-08-05 NOTE — DISCHARGE INSTRUCTIONS
We do not typically recommend hydrocortisone cream for the face as it can cause loss of skin pigmentation and irregular skin pigmentation.  However, at this point, it might be a risk worth taking given the persistence and severity of the rash on your face.

## 2024-08-06 LAB
ATRIAL RATE: 117
P AXIS: 68
PR INTERVAL: 136
QRS DURATION: 78
QT INTERVAL: 314
QTC CALCULATION(BAZETT): 438
R AXIS: 35
T WAVE AXIS: 131
VENTRICULAR RATE: 117

## 2024-08-06 PROCEDURE — 93010 ELECTROCARDIOGRAM REPORT: CPT | Performed by: INTERNAL MEDICINE

## 2024-08-08 ENCOUNTER — HOSPITAL ENCOUNTER (INPATIENT)
Facility: HOSPITAL | Age: 64
LOS: 26 days | Discharge: HOME HEALTH | DRG: 515 | End: 2024-09-03
Attending: INTERNAL MEDICINE | Admitting: STUDENT IN AN ORGANIZED HEALTH CARE EDUCATION/TRAINING PROGRAM
Payer: COMMERCIAL

## 2024-08-08 ENCOUNTER — APPOINTMENT (OUTPATIENT)
Dept: RADIOLOGY | Facility: HOSPITAL | Age: 64
Setting detail: HOSPITAL OUTPATIENT SURGERY
DRG: 515 | End: 2024-08-08
Attending: INTERNAL MEDICINE
Payer: COMMERCIAL

## 2024-08-08 ENCOUNTER — ANESTHESIA (OUTPATIENT)
Dept: OPERATING ROOM | Facility: HOSPITAL | Age: 64
Setting detail: HOSPITAL OUTPATIENT SURGERY
DRG: 515 | End: 2024-08-08
Payer: COMMERCIAL

## 2024-08-08 ENCOUNTER — TELEPHONE (OUTPATIENT)
Dept: CARDIOLOGY | Facility: CLINIC | Age: 64
End: 2024-08-08
Payer: COMMERCIAL

## 2024-08-08 DIAGNOSIS — R11.2 NAUSEA AND VOMITING, UNSPECIFIED VOMITING TYPE: ICD-10-CM

## 2024-08-08 DIAGNOSIS — S81.809S MULTIPLE OPENS WOUND OF LOWER EXTREMITY, UNSPECIFIED LATERALITY, SEQUELA: ICD-10-CM

## 2024-08-08 DIAGNOSIS — R59.0 THORACIC LYMPHADENOPATHY: ICD-10-CM

## 2024-08-08 DIAGNOSIS — J81.0: Primary | ICD-10-CM

## 2024-08-08 DIAGNOSIS — R07.9 CHEST PAIN, UNSPECIFIED TYPE: ICD-10-CM

## 2024-08-08 DIAGNOSIS — R07.9 ACUTE CHEST PAIN: ICD-10-CM

## 2024-08-08 DIAGNOSIS — D64.9 ANEMIA, UNSPECIFIED TYPE: ICD-10-CM

## 2024-08-08 DIAGNOSIS — I73.9 PERIPHERAL ARTERIAL DISEASE (CMS/HCC): ICD-10-CM

## 2024-08-08 DIAGNOSIS — I25.10 CORONARY ARTERY DISEASE INVOLVING NATIVE CORONARY ARTERY OF NATIVE HEART WITHOUT ANGINA PECTORIS: ICD-10-CM

## 2024-08-08 DIAGNOSIS — H57.13 PAIN OF BOTH EYES: ICD-10-CM

## 2024-08-08 DIAGNOSIS — I50.33 ACUTE ON CHRONIC HEART FAILURE WITH PRESERVED EJECTION FRACTION (CMS/HCC): ICD-10-CM

## 2024-08-08 DIAGNOSIS — R91.1 LUNG NODULE: ICD-10-CM

## 2024-08-08 DIAGNOSIS — M34.9 SCLERODERMA (CMS/HCC): ICD-10-CM

## 2024-08-08 LAB
GRAM STN SPEC: NORMAL

## 2024-08-08 PROCEDURE — 63600000 HC DRUGS/DETAIL CODE: Performed by: NURSE ANESTHETIST, CERTIFIED REGISTERED

## 2024-08-08 PROCEDURE — 63600000 HC DRUGS/DETAIL CODE: Mod: JZ | Performed by: STUDENT IN AN ORGANIZED HEALTH CARE EDUCATION/TRAINING PROGRAM

## 2024-08-08 PROCEDURE — 87206 SMEAR FLUORESCENT/ACID STAI: CPT | Performed by: INTERNAL MEDICINE

## 2024-08-08 PROCEDURE — 0B9J8ZX DRAINAGE OF LEFT LOWER LUNG LOBE, VIA NATURAL OR ARTIFICIAL OPENING ENDOSCOPIC, DIAGNOSTIC: ICD-10-PCS | Performed by: INTERNAL MEDICINE

## 2024-08-08 PROCEDURE — C1729 CATH, DRAINAGE: HCPCS | Performed by: INTERNAL MEDICINE

## 2024-08-08 PROCEDURE — 31641 BRONCHOSCOPY TREAT BLOCKAGE: CPT

## 2024-08-08 PROCEDURE — 07D78ZX EXTRACTION OF THORAX LYMPHATIC, VIA NATURAL OR ARTIFICIAL OPENING ENDOSCOPIC, DIAGNOSTIC: ICD-10-PCS | Performed by: INTERNAL MEDICINE

## 2024-08-08 PROCEDURE — 31645 BRNCHSC W/THER ASPIR 1ST: CPT

## 2024-08-08 PROCEDURE — 87070 CULTURE OTHR SPECIMN AEROBIC: CPT | Performed by: INTERNAL MEDICINE

## 2024-08-08 PROCEDURE — 63700000 HC SELF-ADMINISTRABLE DRUG: Performed by: INTERNAL MEDICINE

## 2024-08-08 PROCEDURE — 71045 X-RAY EXAM CHEST 1 VIEW: CPT

## 2024-08-08 PROCEDURE — 37000001 HC ANESTHESIA GENERAL: Performed by: INTERNAL MEDICINE

## 2024-08-08 PROCEDURE — 36000012 HC OR LEVEL 2 EA ADDL MIN: Performed by: INTERNAL MEDICINE

## 2024-08-08 PROCEDURE — 99223 1ST HOSP IP/OBS HIGH 75: CPT | Performed by: STUDENT IN AN ORGANIZED HEALTH CARE EDUCATION/TRAINING PROGRAM

## 2024-08-08 PROCEDURE — 88305 TISSUE EXAM BY PATHOLOGIST: CPT | Performed by: INTERNAL MEDICINE

## 2024-08-08 PROCEDURE — 21400000 HC ROOM AND CARE CCU/INTERMEDIATE

## 2024-08-08 PROCEDURE — 0BBJ8ZX EXCISION OF LEFT LOWER LUNG LOBE, VIA NATURAL OR ARTIFICIAL OPENING ENDOSCOPIC, DIAGNOSTIC: ICD-10-PCS | Performed by: INTERNAL MEDICINE

## 2024-08-08 PROCEDURE — 88172 CYTP DX EVAL FNA 1ST EA SITE: CPT | Performed by: INTERNAL MEDICINE

## 2024-08-08 PROCEDURE — 27200000 HC STERILE SUPPLY

## 2024-08-08 PROCEDURE — 31652 BRONCH EBUS SAMPLNG 1/2 NODE: CPT

## 2024-08-08 PROCEDURE — 87205 SMEAR GRAM STAIN: CPT | Performed by: INTERNAL MEDICINE

## 2024-08-08 PROCEDURE — 88331 PATH CONSLTJ SURG 1 BLK 1SPC: CPT | Performed by: STUDENT IN AN ORGANIZED HEALTH CARE EDUCATION/TRAINING PROGRAM

## 2024-08-08 PROCEDURE — 8E0W8CZ ROBOTIC ASSISTED PROCEDURE OF TRUNK REGION, VIA NATURAL OR ARTIFICIAL OPENING ENDOSCOPIC: ICD-10-PCS | Performed by: INTERNAL MEDICINE

## 2024-08-08 PROCEDURE — 36000002 HC OR LEVEL 2 INITIAL 30MIN: Performed by: INTERNAL MEDICINE

## 2024-08-08 PROCEDURE — 25800000 HC PHARMACY IV SOLUTIONS: Performed by: INTERNAL MEDICINE

## 2024-08-08 PROCEDURE — 87015 SPECIMEN INFECT AGNT CONCNTJ: CPT | Performed by: INTERNAL MEDICINE

## 2024-08-08 PROCEDURE — 71000001 HC PACU PHASE 1 INITIAL 30MIN: Performed by: INTERNAL MEDICINE

## 2024-08-08 PROCEDURE — 88173 CYTOPATH EVAL FNA REPORT: CPT | Performed by: INTERNAL MEDICINE

## 2024-08-08 PROCEDURE — 71000011 HC PACU PHASE 1 EA ADDL MIN: Performed by: INTERNAL MEDICINE

## 2024-08-08 PROCEDURE — 25000000 HC PHARMACY GENERAL: Performed by: NURSE ANESTHETIST, CERTIFIED REGISTERED

## 2024-08-08 PROCEDURE — 87102 FUNGUS ISOLATION CULTURE: CPT | Performed by: INTERNAL MEDICINE

## 2024-08-08 PROCEDURE — 27200000 HC STERILE SUPPLY: Performed by: INTERNAL MEDICINE

## 2024-08-08 RX ORDER — DEXTROSE 40 %
15-30 GEL (GRAM) ORAL AS NEEDED
Status: DISCONTINUED | OUTPATIENT
Start: 2024-08-08 | End: 2024-09-03 | Stop reason: HOSPADM

## 2024-08-08 RX ORDER — ROCURONIUM BROMIDE 10 MG/ML
INJECTION, SOLUTION INTRAVENOUS AS NEEDED
Status: DISCONTINUED | OUTPATIENT
Start: 2024-08-08 | End: 2024-08-08 | Stop reason: SURG

## 2024-08-08 RX ORDER — FENTANYL CITRATE 50 UG/ML
INJECTION, SOLUTION INTRAMUSCULAR; INTRAVENOUS AS NEEDED
Status: DISCONTINUED | OUTPATIENT
Start: 2024-08-08 | End: 2024-08-08 | Stop reason: SURG

## 2024-08-08 RX ORDER — DEXTROSE 50 % IN WATER (D50W) INTRAVENOUS SYRINGE
25 AS NEEDED
Status: DISCONTINUED | OUTPATIENT
Start: 2024-08-08 | End: 2024-09-03 | Stop reason: HOSPADM

## 2024-08-08 RX ORDER — SODIUM CHLORIDE 9 MG/ML
40 INJECTION, SOLUTION INTRAVENOUS CONTINUOUS
Status: DISCONTINUED | OUTPATIENT
Start: 2024-08-08 | End: 2024-08-09

## 2024-08-08 RX ORDER — PROPOFOL 10 MG/ML
INJECTION, EMULSION INTRAVENOUS AS NEEDED
Status: DISCONTINUED | OUTPATIENT
Start: 2024-08-08 | End: 2024-08-08 | Stop reason: SURG

## 2024-08-08 RX ORDER — LIDOCAINE 560 MG/1
1 PATCH PERCUTANEOUS; TOPICAL; TRANSDERMAL DAILY
Status: DISCONTINUED | OUTPATIENT
Start: 2024-08-08 | End: 2024-09-03 | Stop reason: HOSPADM

## 2024-08-08 RX ORDER — PANTOPRAZOLE SODIUM 20 MG/1
20 TABLET, DELAYED RELEASE ORAL DAILY
Status: DISCONTINUED | OUTPATIENT
Start: 2024-08-09 | End: 2024-08-18

## 2024-08-08 RX ORDER — FENTANYL CITRATE 50 UG/ML
50 INJECTION, SOLUTION INTRAMUSCULAR; INTRAVENOUS EVERY 5 MIN PRN
Status: DISCONTINUED | OUTPATIENT
Start: 2024-08-08 | End: 2024-08-08 | Stop reason: HOSPADM

## 2024-08-08 RX ORDER — AMLODIPINE BESYLATE 10 MG/1
10 TABLET ORAL EVERY MORNING
Status: DISCONTINUED | OUTPATIENT
Start: 2024-08-09 | End: 2024-09-03 | Stop reason: HOSPADM

## 2024-08-08 RX ORDER — DEXAMETHASONE SODIUM PHOSPHATE 4 MG/ML
INJECTION, SOLUTION INTRA-ARTICULAR; INTRALESIONAL; INTRAMUSCULAR; INTRAVENOUS; SOFT TISSUE AS NEEDED
Status: DISCONTINUED | OUTPATIENT
Start: 2024-08-08 | End: 2024-08-08 | Stop reason: SURG

## 2024-08-08 RX ORDER — ATORVASTATIN CALCIUM 40 MG/1
40 TABLET, FILM COATED ORAL
Status: DISCONTINUED | OUTPATIENT
Start: 2024-08-08 | End: 2024-09-03 | Stop reason: HOSPADM

## 2024-08-08 RX ORDER — ACETAMINOPHEN 325 MG/1
650 TABLET ORAL EVERY 4 HOURS PRN
Status: DISCONTINUED | OUTPATIENT
Start: 2024-08-08 | End: 2024-08-08 | Stop reason: HOSPADM

## 2024-08-08 RX ORDER — LIDOCAINE HYDROCHLORIDE 10 MG/ML
INJECTION, SOLUTION INFILTRATION; PERINEURAL AS NEEDED
Status: DISCONTINUED | OUTPATIENT
Start: 2024-08-08 | End: 2024-08-08 | Stop reason: SURG

## 2024-08-08 RX ORDER — BENZONATATE 100 MG/1
100 CAPSULE ORAL 3 TIMES DAILY PRN
Status: DISCONTINUED | OUTPATIENT
Start: 2024-08-08 | End: 2024-09-03 | Stop reason: HOSPADM

## 2024-08-08 RX ORDER — ONDANSETRON HYDROCHLORIDE 2 MG/ML
INJECTION, SOLUTION INTRAVENOUS AS NEEDED
Status: DISCONTINUED | OUTPATIENT
Start: 2024-08-08 | End: 2024-08-08 | Stop reason: SURG

## 2024-08-08 RX ORDER — DOCUSATE SODIUM 100 MG/1
100 CAPSULE, LIQUID FILLED ORAL DAILY PRN
Status: DISCONTINUED | OUTPATIENT
Start: 2024-08-08 | End: 2024-08-22

## 2024-08-08 RX ORDER — DEXTROSE 50 % IN WATER (D50W) INTRAVENOUS SYRINGE
25 AS NEEDED
Status: DISCONTINUED | OUTPATIENT
Start: 2024-08-08 | End: 2024-08-08 | Stop reason: SDUPTHER

## 2024-08-08 RX ORDER — ETHACRYNIC ACID 25 MG/1
50 TABLET ORAL ONCE
Status: COMPLETED | OUTPATIENT
Start: 2024-08-08 | End: 2024-08-08

## 2024-08-08 RX ORDER — IBUPROFEN 200 MG
16-32 TABLET ORAL AS NEEDED
Status: DISCONTINUED | OUTPATIENT
Start: 2024-08-08 | End: 2024-09-03 | Stop reason: HOSPADM

## 2024-08-08 RX ORDER — LEVOTHYROXINE SODIUM 100 UG/1
100 TABLET ORAL DAILY
Status: DISCONTINUED | OUTPATIENT
Start: 2024-08-09 | End: 2024-08-14

## 2024-08-08 RX ORDER — DEXTROSE 40 %
15-30 GEL (GRAM) ORAL AS NEEDED
Status: DISCONTINUED | OUTPATIENT
Start: 2024-08-08 | End: 2024-08-08 | Stop reason: SDUPTHER

## 2024-08-08 RX ORDER — PHENYLEPHRINE HYDROCHLORIDE 10 MG/ML
INJECTION INTRAVENOUS AS NEEDED
Status: DISCONTINUED | OUTPATIENT
Start: 2024-08-08 | End: 2024-08-08 | Stop reason: SURG

## 2024-08-08 RX ORDER — LIDOCAINE 560 MG/1
1 PATCH PERCUTANEOUS; TOPICAL; TRANSDERMAL DAILY
Status: DISCONTINUED | OUTPATIENT
Start: 2024-08-08 | End: 2024-08-09

## 2024-08-08 RX ORDER — SILDENAFIL CITRATE 20 MG/1
20 TABLET ORAL 3 TIMES DAILY
Status: DISCONTINUED | OUTPATIENT
Start: 2024-08-08 | End: 2024-09-03 | Stop reason: HOSPADM

## 2024-08-08 RX ORDER — IBUPROFEN 200 MG
16-32 TABLET ORAL AS NEEDED
Status: DISCONTINUED | OUTPATIENT
Start: 2024-08-08 | End: 2024-08-08 | Stop reason: SDUPTHER

## 2024-08-08 RX ORDER — ETHACRYNIC ACID 25 MG/1
50 TABLET ORAL DAILY
Qty: 180 TABLET | Refills: 1 | OUTPATIENT
Start: 2024-08-08 | End: 2024-09-03 | Stop reason: HOSPADM

## 2024-08-08 RX ORDER — LANOLIN ALCOHOL/MO/W.PET/CERES
1000 CREAM (GRAM) TOPICAL DAILY
Status: DISCONTINUED | OUTPATIENT
Start: 2024-08-09 | End: 2024-09-03 | Stop reason: HOSPADM

## 2024-08-08 RX ORDER — ONDANSETRON HYDROCHLORIDE 2 MG/ML
4 INJECTION, SOLUTION INTRAVENOUS
Status: DISCONTINUED | OUTPATIENT
Start: 2024-08-08 | End: 2024-08-08 | Stop reason: HOSPADM

## 2024-08-08 RX ORDER — HYDROMORPHONE HYDROCHLORIDE 1 MG/ML
0.5 INJECTION, SOLUTION INTRAMUSCULAR; INTRAVENOUS; SUBCUTANEOUS
Status: DISCONTINUED | OUTPATIENT
Start: 2024-08-08 | End: 2024-08-08 | Stop reason: HOSPADM

## 2024-08-08 RX ADMIN — ONDANSETRON HYDROCHLORIDE 4 MG: 2 SOLUTION INTRAMUSCULAR; INTRAVENOUS at 13:22

## 2024-08-08 RX ADMIN — FENTANYL CITRATE 50 MCG: 50 INJECTION, SOLUTION INTRAMUSCULAR; INTRAVENOUS at 12:10

## 2024-08-08 RX ADMIN — SILDENAFIL 20 MG: 20 TABLET, FILM COATED ORAL at 22:23

## 2024-08-08 RX ADMIN — SODIUM CHLORIDE: 9 INJECTION, SOLUTION INTRAVENOUS at 11:51

## 2024-08-08 RX ADMIN — PHENYLEPHRINE HYDROCHLORIDE 70 MCG: 10 INJECTION INTRAVENOUS at 12:04

## 2024-08-08 RX ADMIN — FENTANYL CITRATE 50 MCG: 50 INJECTION, SOLUTION INTRAMUSCULAR; INTRAVENOUS at 12:00

## 2024-08-08 RX ADMIN — ETHACRYNIC ACID 50 MG: 25 TABLET ORAL at 15:53

## 2024-08-08 RX ADMIN — FENTANYL CITRATE 50 MCG: 50 INJECTION, SOLUTION INTRAMUSCULAR; INTRAVENOUS at 16:05

## 2024-08-08 RX ADMIN — LIDOCAINE 4% 1 PATCH: 40 PATCH TOPICAL at 22:22

## 2024-08-08 RX ADMIN — ACETAMINOPHEN 650 MG: 325 TABLET ORAL at 17:22

## 2024-08-08 RX ADMIN — ROCURONIUM BROMIDE 100 MG: 10 INJECTION INTRAVENOUS at 12:05

## 2024-08-08 RX ADMIN — PROPOFOL 150 MCG/KG/MIN: 10 INJECTION, EMULSION INTRAVENOUS at 12:08

## 2024-08-08 RX ADMIN — PROPOFOL 30 MG: 10 INJECTION, EMULSION INTRAVENOUS at 12:09

## 2024-08-08 RX ADMIN — LIDOCAINE HYDROCHLORIDE 5 ML: 10 INJECTION, SOLUTION INFILTRATION; PERINEURAL at 12:00

## 2024-08-08 RX ADMIN — SUGAMMADEX 200 MG: 100 INJECTION, SOLUTION INTRAVENOUS at 13:25

## 2024-08-08 RX ADMIN — PROPOFOL 70 MG: 10 INJECTION, EMULSION INTRAVENOUS at 12:05

## 2024-08-08 RX ADMIN — DEXAMETHASONE SODIUM PHOSPHATE 8 MG: 4 INJECTION, SOLUTION INTRAMUSCULAR; INTRAVENOUS at 12:13

## 2024-08-08 ASSESSMENT — COGNITIVE AND FUNCTIONAL STATUS - GENERAL
HELP NEEDED FOR PERSONAL GROOMING: 3 - A LITTLE
EATING MEALS: 4 - NONE
DRESSING REGULAR LOWER BODY CLOTHING: 3 - A LITTLE
MOVING TO AND FROM BED TO CHAIR: 3 - A LITTLE
TOILETING: 3 - A LITTLE
HELP NEEDED FOR BATHING: 3 - A LITTLE
DRESSING REGULAR UPPER BODY CLOTHING: 3 - A LITTLE
WALKING IN HOSPITAL ROOM: 3 - A LITTLE
CLIMB 3 TO 5 STEPS WITH RAILING: 2 - A LOT
STANDING UP FROM CHAIR USING ARMS: 3 - A LITTLE

## 2024-08-08 ASSESSMENT — PAIN SCALES - GENERAL: PAIN_LEVEL: 0

## 2024-08-08 NOTE — ANESTHESIA PREPROCEDURE EVALUATION
Relevant Problems   CARDIOVASCULAR   (+) Acute on chronic heart failure with preserved ejection fraction (CMS/HCC)   (+) CAD (coronary artery disease)   (+) Essential (primary) hypertension   (+) Pulmonary hypertension (CMS/HCC)      GASTROINTESTINAL   (+) GERD (gastroesophageal reflux disease)      HEMATOLOGY   (+) Anemia      NEUROLOGY   (+) Vertigo      RESPIRATORY SYSTEM   (+) Dyspnea   (+) Shortness of breath      Other   (+) Hypothyroid     Past Medical History:   Diagnosis Date    At risk for falls     De Quervain's tenosynovitis     Essential (primary) hypertension     Follicular carcinoma of thyroid (CMS/HCC)     Gastro-esophageal reflux     Hand ulceration (CMS/HCC)     Hyperlipidemia, unspecified     Interstitial lung disease (CMS/HCC)     Leg ulcer (CMS/HCC)     Lung nodule     Lupus (CMS/HCC)     O2 dependent     On 2L    Osteomyelitis of hand (CMS/HCC)     Osteoporosis     Pulmonary hypertension (CMS/HCC)     PVD (peripheral vascular disease) (CMS/HCC)     Raynaud's disease     Severe    Reflux esophagitis     Scleroderma (CMS/HCC)     Screening mammogram for breast cancer 2021    BI-RADS category: 1 - Negative (care everywhere @ [facility])    Vertigo     Wound, open, foot     bilateral,dressing change with medihoney and mupirocin ointment by homecare nurse       Past Surgical History:   Procedure Laterality Date    CARDIAC CATHETERIZATION      COLONOSCOPY      HERNIA REPAIR      THYROIDECTOMY         Social History     Socioeconomic History    Marital status:      Spouse name: Not on file    Number of children: Not on file    Years of education: Not on file    Highest education level: Not on file   Occupational History    Not on file   Tobacco Use    Smoking status: Former     Types: Cigarettes     Quit date: 9/15/2005     Years since quittin.9    Smokeless tobacco: Never    Tobacco comments:     Would smoke 1/2 of 1/2 PPD, quit in    Vaping Use    Vaping Use: Never used   Substance  "and Sexual Activity    Alcohol use: Never    Drug use: Never    Sexual activity: Not Currently     Birth control/protection: Post-menopausal   Other Topics Concern    Not on file   Social History Narrative    Not on file     Social Determinants of Health     Financial Resource Strain: Not on file   Food Insecurity: No Food Insecurity (7/25/2024)    Hunger Vital Sign     Worried About Running Out of Food in the Last Year: Never true     Ran Out of Food in the Last Year: Never true   Transportation Needs: No Transportation Needs (5/22/2024)    PRAPARE - Transportation     Lack of Transportation (Medical): No     Lack of Transportation (Non-Medical): No   Physical Activity: Not on file   Stress: Not on file   Social Connections: Not on file   Intimate Partner Violence: Not on file   Housing Stability: Low Risk  (5/22/2024)    Housing Stability Vital Sign     Unable to Pay for Housing in the Last Year: No     Number of Places Lived in the Last Year: 1     Unstable Housing in the Last Year: No       Allergies   Allergen Reactions    Aspirin Shortness of breath     Other reaction(s): Unknown  \"stop breathing\"      Ibuprofen Shortness of breath     Stop breathing    Iodine Shortness of breath     Other reaction(s): Unknown    Iodine And Iodide Containing Products Shortness of breath     ? rash    Morphine Shortness of breath      Reported wamrth of face, improved with benadryl and cold compresses after getting morphine as well as episode of (subjective ) dyspnea, and thought she passed out - did have wamrth and erythema of face with mild edema R>L cheek notably on exam.     Penicillins Shortness of breath     Other reaction(s): Unknown    Sulfa (Sulfonamide Antibiotics) Angioedema    Clindamycin     Hydrochlorothiazide      Other reaction(s): Abdominal Pain   Slow heart rate    Oxycodone      Other reaction(s): Vomiting    Sulfamethoxazole-Trimethoprim      Other reaction(s): Vomiting, Weakness     Latex Rash "         Current Facility-Administered Medications:     sodium chloride 0.9 % infusion, 40 mL/hr, intravenous, Continuous, Joel Rodriguez MD    There were no vitals filed for this visit.    Cardiac Imaging    TRANSTHORACIC ECHO (TTE) LIMITED 07/19/2024    Interpretation Summary  Rhythm is sinus tachycardia.    Mild increased wall thickness with normal left ventricular cavity and preserved systolic function. EF 65%. No wall motion abnormalities.  Normal right ventricular size with preserved systolic function.  IVC normal in size with >50% respiratory variation consistent with normal right sided filling pressures.  Small pericardial effusion. No evidence of hemodynamic compromise with normal respiratory variation and no chamber collapse. Prominent epicardial fat.  Compared with previous study of 5/23/2024, small pericardial effusion persists.      CBC Results         07/25/24 07/20/24 07/19/24     1420 0414 1432    WBC 10.29 7.27 9.78    RBC 4.64 3.98 4.46    HGB 11.2 9.8 10.9    HCT 37.3 32.0 35.9    MCV 80.4 80.4 80.5    MCH 24.1 24.6 24.4    MCHC 30.0 30.6 30.4     206 212          BMP Results         07/25/24 07/20/24 07/19/24     1420 0415 1432     142 143    K 4.3 4.2 4.0    Cl 103 107 110    CO2 27 25 23    Glucose 86 85 93    BUN 17 17 15    Creatinine 1.1 1.2 1.0    Calcium 10.6 9.3 9.7    Anion Gap 9 10 10    EGFR 56.6 51.0 >60.0           Comment for K at 1420 on 07/25/24: Results obtained on plasma. Plasma Potassium values may be up to 0.4 mEQ/L less than serum values. The differences may be greater for patients with high platelet or white cell counts.    Comment for K at 1432 on 07/19/24: Results obtained on plasma. Plasma Potassium values may be up to 0.4 mEQ/L less than serum values. The differences may be greater for patients with high platelet or white cell counts.    Comment for EGFR at 1420 on 07/25/24: Calculation based on the Chronic Kidney Disease Epidemiology Collaboration  (CKD-EPI) equation refit without adjustment for race.    Comment for EGFR at 0415 on 07/20/24: Calculation based on the Chronic Kidney Disease Epidemiology Collaboration (CKD-EPI) equation refit without adjustment for race.    Comment for EGFR at 1432 on 07/19/24: Calculation based on the Chronic Kidney Disease Epidemiology Collaboration (CKD-EPI) equation refit without adjustment for race.          PT/PTT Results         07/22/24 04/09/24 03/11/24     0938 1414 1731    PT 13.1 12.9 13.3    INR 1.0 -- 1.0    PTT -- -- 26           Comment for INR at 0938 on 07/22/24: INR has no defined significance when PT is within Reference Range.    Comment for INR at 1731 on 03/11/24: INR has no defined significance when PT is within Reference Range.          The patient does not have an active anticoagulation episode.        ROS/Med Hx     Past Surgical History:   Procedure Laterality Date    CARDIAC CATHETERIZATION      COLONOSCOPY      HERNIA REPAIR      THYROIDECTOMY         Physical Exam    Anesthesia Plan    Plan: general    Technique: general endotracheal     Lines and Monitors: additional IV     Airway: direct visual laryngoscopy    3 ASA  Anesthetic plan and risks discussed with: patient  Induction:    intravenous   Postop Plan:   Patient Disposition: inpatient floor planned admission   Pain Management: IV analgesics

## 2024-08-08 NOTE — ANESTHESIA PROCEDURE NOTES
Airway  Urgency: elective    Start Time: 8/8/2024 12:08 PM  Difficult airway    General Information and Staff    Patient location during procedure: OR  Anesthesiologist: Ayo Burt MD  Resident/CRNA: Joel Bryant CRNA  Performed: anesthesiologist   Performed by: Joel Bryant CRNA  Authorized by: Ayo Burt MD      Indications and Patient Condition  Indications for airway management: anesthesia  Sedation level: deep  Preoxygenated: yes  Patient position: sniffing  MILS maintained throughout  Mask difficulty assessment: 3 - difficult mask (inadequate, unstable or two providers) +/- NMBA    Final Airway Details  Final airway type: endotracheal airway      Successful airway: ETT     Successful intubation technique: video laryngoscopy  Facilitating devices/methods: intubating stylet  Endotracheal tube insertion site: oral  Blade: Celina  Blade size: #4  ETT size (mm): 8.0  Cormack-Lehane Classification: grade I - full view of glottis  Placement verified by: chest auscultation and capnometry   Measured from: lips  ETT to lips (cm): 23  Number of attempts at approach: 1  Atraumatic airway insertion      Additional Comments  DL by Annabelle RENDON Glidescope intubation for assumed difficult airway. Gr1 view, atraumatic.

## 2024-08-08 NOTE — NURSING NOTE
Received pt in stretcher  5:45pm pt AAOX4 on oxygen 3 liters N/C pt resting sinus rhythm on monitor.pt waiting for a bed in [address] [place] [address].

## 2024-08-08 NOTE — ANESTHESIA POSTPROCEDURE EVALUATION
Patient: Arielle Felix    Procedure Summary       Date: 08/08/24 Room / Location: [facility] OR [facility] / [facility] OR    Anesthesia Start: 1151 Anesthesia Stop: 1346    Procedure: ION Navigational Bronchoscopy with Radial Probe and Fluoroscopy + EBUS Diagnosis:       Lung nodule      Thoracic lymphadenopathy      (R91.1 Lung Nodule R59.0 KAM)    Surgeons: Joel Rodriguez MD Responsible Provider: Ayo Burt MD    Anesthesia Type: general ASA Status: 3            Anesthesia Type: general  PACU Vitals  8/8/2024 1334 - 8/8/2024 1434        8/8/2024  1341 8/8/2024  1345 8/8/2024  1400 8/8/2024  1415    BP: 149/65 148/71 149/68 153/77    Temp: 36.5 °C (97.7 °F) -- -- 36.4 °C (97.6 °F)    Pulse: 90 80 80 78    Resp: 16 14 12 18    SpO2: 98 % 96 % 98 % --                8/8/2024  1430             BP: --       Temp: --       Pulse: --       Resp: --       SpO2: 93 %                 Anesthesia Post Evaluation    Pain score: 0  Pain management: adequate  Mode of pain management: IV medication  Patient location during evaluation: PACU  Patient participation: complete - patient participated  Level of consciousness: awake and alert  Cardiovascular status: acceptable  Airway Patency: adequate  Respiratory status: acceptable  Hydration status: acceptable  Anesthetic complications: no

## 2024-08-08 NOTE — ANESTHESIOLOGIST PRE-PROCEDURE ATTESTATION
Pre-Procedure Patient Identification:  I am the Primary Anesthesiologist and have identified the patient on 08/08/24 at 11:24 AM.   I have confirmed the procedure(s) will be performed by the following surgeon/proceduralist Joel Rodriguez MD.

## 2024-08-08 NOTE — PERIOPERATIVE NURSING NOTE
Patient reports pain is not going away. Attempting to get EKG but  floor EKG not working.Patient denies  nausea, diaphoresis or radiation /back pain.Dr. Galvin notified.

## 2024-08-08 NOTE — PERIOPERATIVE NURSING NOTE
Dr. Conti in to see patient. Will admit patient. EKG and chest xray done. Consult to cardiology called.

## 2024-08-08 NOTE — PERIOPERATIVE NURSING NOTE
"Patient reports pain in apical area of chest, \"Bone on bone feeling) 10/10 but only wants Tylenol.( Has the pain at home but does nothing about it). Suggested IV pain medicine and if doesn't go away  will get an order for Tylenol.. Patient agrees.  "

## 2024-08-08 NOTE — TELEPHONE ENCOUNTER
Hi -     Ms. Felix had bronch biopsy today. Post-procedure CXR shows worsening congestion. I've asked them to increase her Ethacrynic Acid to 50 mg daily. Could you please call her tomorrow (and possibly next week) for symptoms and weights?    ThanksTimothy

## 2024-08-08 NOTE — PROGRESS NOTES
Appreciate all help    Tenuous patient overall    Repeat CXR - no PTX    But will admit   -CHF on CXR worse  -acute on chronic hypoxemic RF  -atypical chest pain    Thank you

## 2024-08-08 NOTE — H&P
Hospital Medicine Service -  History & Physical        CHIEF COMPLAINT   Chest Pain      HISTORY OF PRESENT ILLNESS      Ms Felix is a 63 y.o. 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 She had a successful EBUS to 11L with on-site cytology negative for malignancy or granuloma. Interval post-procedure CXR revealed diffuse interstitial infiltrates with alveolar infiltrate throughout the majority of the left lung, and moderate size layering left pleural effusion which is concerning for volume overload. Additionally, post-procedure she was found to have intractably bony chest pain which has not been responsive to tylenol. She is currently on 3L NC (2L at baseline) but was saturating well when titrated back to 2L. Remaining vital are within acceptable limits. She reportedly did not take her daily edecrin today and this was ordered by the pulmonary team in the PACU.    PAST MEDICAL AND SURGICAL HISTORY      Past Medical History:   Diagnosis Date    At risk for falls     De Quervain's tenosynovitis     Essential (primary) hypertension     Follicular carcinoma of thyroid (CMS/HCC)     Gastro-esophageal reflux     Hand ulceration (CMS/HCC)     Hyperlipidemia, unspecified     Interstitial lung disease (CMS/HCC)     Leg ulcer (CMS/HCC)     Lung nodule     Lupus (CMS/HCC)     O2 dependent     On 2L    Osteomyelitis of hand (CMS/HCC)     Osteoporosis     Pulmonary hypertension (CMS/HCC)     PVD (peripheral vascular disease) (CMS/HCC)     Raynaud's disease     Severe    Reflux esophagitis     Scleroderma (CMS/HCC)     Screening mammogram for breast cancer 05/04/2021    BI-RADS category: 1 - Negative (care everywhere @ [facility])    Vertigo     Wound, open, foot     bilateral,dressing change with medihoney and mupirocin ointment  by homecare nurse       Past Surgical History:   Procedure Laterality Date    CARDIAC CATHETERIZATION      COLONOSCOPY      HERNIA REPAIR      THYROIDECTOMY         PCP: Sara Simmons MD    MEDICATIONS      Prior to Admission medications    Medication Sig Start Date End Date Taking? Authorizing Provider   amLODIPine (NORVASC) 5 mg tablet Take 10 mg by mouth every morning.   Yes ProviderKera MD   atorvastatin (LIPITOR) 40 mg tablet Take 1 tablet (40 mg total) by mouth daily. 6/20/24 9/18/24 Yes Timothy Arvizu DO   benzonatate (TESSALON) 100 mg capsule Take 100 mg by mouth 3 (three) times a day as needed for cough.   Yes ProviderKera MD   biotin 1 mg tablet Take 1,000 mcg by mouth daily.   Yes Romel Hidalgo MD   cholecalciferol, vitamin D3, 1,000 unit (25 mcg) tablet Take 1,000 Units by mouth daily.   Yes Romel Hidalgo MD   collagenase (SantyL) ointment Apply 1 Application topically daily.   Yes ProviderKera MD   cyanocobalamin, vitamin B-12, 1,000 mcg capsule Take 1,000 mcg by mouth daily. 2/14/20  Yes Romel Hidalgo MD   levothyroxine (SYNTHROID) 100 mcg tablet Take 100 mcg by mouth daily. BRAND ONLY 7/6/20  Yes Romel Hidalgo MD   MEDIHONEY, HONEY, TOP Apply topically daily.   Yes ProviderKera MD   mupirocin (BACTROBAN) 2 % ointment Apply 1 application. topically daily. Behind ears   Yes Kera Hidalgo MD   OPSUMIT 10 mg tablet tablet Take 1 tablet (10 mg total) by mouth daily. 7/23/24 8/22/24 Yes Molly Hidalgo CRNP   sildenafiL, pulm.hypertension, (REVATIO) 20 mg tablet Take 1 tablet (20 mg total) by mouth 3 (three) times a day. 1/4/24  Yes Timothy Arvizu DO   ethacrynic acid (EDECRIN) 25 mg tablet Take 1 tablet (25 mg total) by mouth daily.  Patient taking differently: Take 25 mg by mouth nightly. 7/24/24 8/8/24 Yes Urmila Rasmussen PA C   docusate sodium (COLACE) 100 mg capsule Take 100 mg by mouth daily as needed for  constipation.    ProviderRomel MD   ethacrynic acid (EDECRIN) 25 mg tablet Take 2 tablets (50 mg total) by mouth daily. 24   Timothy Arvizu DO   hydrocortisone 1 % cream Apply to affected area 2 times daily 24  Naun Prasad (Ed) MD Ramirez   lidocaine (ASPERCREME) 4 % adhesive patch,medicated topical patch Apply 1 patch topically daily as needed (pain). Remove & discard patch within 12 hours or as directed by prescriber. 24  Naun Prasad (Ed) MD Ramirez   loperamide (IMODIUM) 2 mg capsule Take 2 mg by mouth every 8 (eight) hours as needed for diarrhea.    ProviderKera MD   meclizine (ANTIVERT) 25 mg tablet Take 25 mg by mouth daily as needed.    Romel Hidalog MD   pantoprazole (PROTONIX) 20 mg EC tablet Take 20 mg by mouth daily.    ProviderKera MD   sodium chloride (OCEAN) 0.65 % nasal spray Administer 2 sprays into each nostril daily.    Kera Hidalgo MD   tocilizumab (ACTEMRA) 200 mg/10 mL (20 mg/mL) solution Infuse 8 mg/kg into a venous catheter every 28 (twentyeight) days.    ProviderKera MD       ALLERGIES      Aspirin, Ibuprofen, Iodine, Iodine and iodide containing products, Morphine, Penicillins, Sulfa (sulfonamide antibiotics), Clindamycin, Hydrochlorothiazide, Oxycodone, Sulfamethoxazole-trimethoprim, and Latex    FAMILY HISTORY      Family History   Problem Relation Age of Onset    Heart disease Biological Brother         stent    Breast cancer Niece     Cervical cancer Neg Hx     Uterine cancer Neg Hx     Colon cancer Neg Hx     Ovarian cancer Neg Hx        SOCIAL HISTORY      Social History     Socioeconomic History    Marital status:      Spouse name: None    Number of children: None    Years of education: None    Highest education level: None   Tobacco Use    Smoking status: Former     Types: Cigarettes     Quit date: 9/15/2005     Years since quittin.9    Smokeless tobacco: Never    Tobacco  comments:     Would smoke 1/2 of 1/2 PPD, quit in 2005   Vaping Use    Vaping Use: Never used   Substance and Sexual Activity    Alcohol use: Never    Drug use: Never    Sexual activity: Not Currently     Birth control/protection: Post-menopausal     Social Determinants of Health     Food Insecurity: No Food Insecurity (7/25/2024)    Hunger Vital Sign     Worried About Running Out of Food in the Last Year: Never true     Ran Out of Food in the Last Year: Never true   Transportation Needs: No Transportation Needs (5/22/2024)    PRAPARE - Transportation     Lack of Transportation (Medical): No     Lack of Transportation (Non-Medical): No   Housing Stability: Low Risk  (5/22/2024)    Housing Stability Vital Sign     Unable to Pay for Housing in the Last Year: No     Number of Places Lived in the Last Year: 1     Unstable Housing in the Last Year: No       REVIEW OF SYSTEMS      All other systems reviewed and negative except as noted in HPI    PHYSICAL EXAMINATION      Temp:  [36.2 °C (97.1 °F)-37.6 °C (99.7 °F)] 36.2 °C (97.1 °F)  Heart Rate:  [] 88  Resp:  [12-20] 20  BP: (126-153)/(60-86) 143/62  There is no height or weight on file to calculate BMI.    General Appearance: chronically ill appearing, NAD  HEENT: NCAT, EOMI, dry MM  Lungs: normal expansion, decreased air movement, mild crackles at bases  Chest: RRR, TTP over left pectoral region  Abdomen: soft, non-TTP  Skin: scarring hyperpigmentation on the scalp, forehead and cheeks; diffuse skin thickening involving the fingers, hands, forearms, lower extremities, face and abdomen  Neuro: AAOx3, grossly intact  LABS / IMAGING / EKG        Labs  I have reviewed the patient's labs to the time of note. No new clinical concern.    Imaging  I have independently reviewed the pertinent imaging from the last 24 hrs.      ECG/Telemetry  I have independently reviewed the ECG. No significant findings.    ASSESSMENT AND PLAN           #Atypical Chest Pain  Notes  intermittent episodes of chest pain which has been ongoing, worse after her EBUS. Her clinical exam is suggestive of an MSK etiology.  - Acetaminophen PRN for pain  - Lidocaine patch for localized treatment  - If she develops anginal symptoms can consider cardiac workup with EKG and troponin  - She is known to our cards team and they have been consulted for further input    #Pulmonary edema  Known history of chronic hypoxemic respiratory failure in setting of ILD from systemic sclerosis. CXR concerning for pleural effusion. Given her history of intolerance to multiple medications that would help with her effusion, was recently started on edecrin by her cardiolgost. While she didn't take it this AM I doubt this was the only culprit and she may need to have her regimen further optimized.  -Continue home regimen of edecrin   -Close follow up with her cards / pulm team as outpatient    #Interstitial lung disease  She has ongoing bouts of SOB and require 2L NC at baseline. Follows with pulmonary (Dr. Yovani Wasserman) and Cardiology (Dr. Arvizu). Current reigmen includes sildenafil and macitentan.   - Continue sildenafil and macitentan.   - Further recs as per pulmonology    #Raynaud's disease  She was diagnosed with systemic sclerosis in the 1990s - sclerodactyly, +RP with digital tip ulcers c/b L 2nd digit amputation, +MANINDER, +scl-70, telangiectasias. Currently on amlodipine 10mg in the morning and 10mg in the evening if she needs it. Also on sildenafil  - Continue home reigmen as prescribed     VTE Assessment: Padua    VTE Prophylaxis: Current anticoagulants:    None      Code Status: Full Code      Estimated Discharge Date: 8/9/2024  Disposition Planning: Monitor overnight and DC home if stable.      Eladio Patel MD  8/8/2024

## 2024-08-08 NOTE — OP NOTE
Date: 8/8/2024    Pre-Procedure Diagnosis: PET avid LLL nodule, mediastinal LN    Post-Procedure Diagnosis: LLL nodule atypical gland concerning for adenocarcinoma     Attending Physician: Dr. Rodriguez    Nodule Size - 16mm  Nodule Character  - Solid  Location - LLL    Anesthesia: General    Summary of Procedure:  All risks and benefits of the procedure were explained in detail to the patient and consent was obtained and can be found on the chart. ION robot assisted bronchoscopy was performed in the OR with general anesthesia. A time out was performed.    The patient was intubated by the anesthesia team with a #8 ETT.     EBUS then performed, the balloon was not utilized due to patient's latex allergy:  11L- 1.2 cm in largest dimension, transbronchial needle aspirations were performed to this area with 3 passes.   No other pathologically enlarged mediastinal/hilar lymph nodes >5mm on full EBUS survey     On site cytology was negative for malignancy or granuloma.    The ETT was adjusted to at least 5 cm above the main margy.    The ION robotic catheter was then introduced via the ETT and successfully registered. The catheter was then passed with guidance to the LLL lesion and confirmed in place using rEBUS and 3D fluoroscopy.    REBUS - concentric    Cryobiopsies were sent to cytology and pathology.  Bilateral bronchial wash was sent for sputum, fungal and AFB.    On site evaluation frozen sections were consistent atypical glands concerning for adenocarcinoma.    The ION was then removed. Standard bronchoscopy was performed to clear blood and secretions from the airway.    The patient was extubated and sent to the recovery room.    Impression:  Successful navigational bronchoscopy to LLL nodule with on-site frozen sections consistent with atypical gland     Successful EBUS to 11L with on-site cytology negative for malignancy or granuloma.    Estimated Blood Loss: <5cc    Ginna BragaKavyaDenys Evans DO  Pulmonary & Critical  Care Fellow, PGY-4  8/8/2024  1:43 PM

## 2024-08-08 NOTE — OR SURGEON
I am the physician performing the procedure today and I identified the patient at 1130 on 8/8/24.    Joel Rodriguez MD  [phone]

## 2024-08-08 NOTE — PROGRESS NOTES
Post op CXR with increased CHF  -she did not take her edecrin today  -will dose 50 mg now  -to increase her dose to 50 mg daily at home

## 2024-08-08 NOTE — NURSING NOTE
Pt aaox4  denies any pain/discomfort still waiting for bed placement,Report given to Kathleen Morrow RN.

## 2024-08-08 NOTE — DISCHARGE INSTRUCTIONS
Dear Ms. Isidro,    You were admitted to the hospital for chest pain after your bronchoscopy procedure.    You were evaluated by pulmonology, cardiology, Vascular surgery, pain management while you were here.    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 You underwent a left leg angiogram and balloon angioplasty of multiple arteries with Dr. Richards on 8/14/24. You will now be on a medication called plavix, this is an anti-platelet, which will thin your blood.    For your pain, we have started you on gabapentin 100mg three times daily, flexeril 5mg every 8hrs as needed for muscle spasms, Dilaudid 2mg every 4hrs AS NEEDED for SEVERE pain. To prevent opioid constipation, please take a bowel regimen such as Miralax when taking dilaudid. For nausea, you can take zofran 4mg every 6hrs AS NEEDED. We recommend taking your medications with food and not taking medications on an empty stomach.    Continue local wound care to your left toe with medihoney and mepilex daily and as needed.    Important Issues to Address in Follow-Up    []  Follow up with PCP within 1-2 weeks    []  Follow up with Pain management for scheduled appointment    []  Follow up with Cardiology Dr. Arvizu within 2-4 weeks    []  Follow up with Pulmonology to discuss biopsy results    It has been a pleasure caring for you at [facility], we wish you the best of health moving forward.      ---------------------------------------------------    Bronchoscopy Discharge Instructions    1. You had a bronchoscopy. Since IV sedation/anestesia was used, you may feel drowsy. You are not permitted to drive today, nor have any alcoholic beverages. Rest for the remainder of the day. You may resumed these activities tomorrow.    Occasionally, the arm gets sore from the intravenous site. Use cold compresses today, followed by warm compresses tomorrow and as along as needed. If a red streak develops from the site, please notify your physician.    2. You may eat at 1430. Begin by taking a small  sip of water. If you do not cough, you may eat and drink. Please chew your food well and eat slowly.    3. You may see streaks of blood in your sputum for 1-2 days; this is normal. DANGER SIGNALS: If this increases or you develop chest pain, shortness of breath, a temperature greater than 102, or any other symptoms, call your doctor.    4. Medications: See Medication Reconciliation List    5. If a problem occurs after discharge notify Dr. Rodriguez at [phone].    6. Call Dr. Rodriguez for the results of your bronchoscopy in 48-72 hours at [phone].    7. Follow up appointment: Dr. Wasserman    8. INCREASE EDECRIN TO 50 MG DAILY    9. These discharge instructions have been explained to the patient/family. I have received and understand the above instructions.    Wound Care Discharge Instructions   [facility]   Activity:   · No strenuous exercise or heavy lifting (over 10 pounds) until your follow up with doctor  · Keep your affected foot elevated on two pillows while in bed and sitting in a chair to decrease swelling  Nutrition:   · Eating more protein will help with wound healing, If possible, add protein to your diet to help increase healing factors.  Wound Care Instructions:   · Your wound care dressing may be removed by a home care nurse or healthcare trained family member.  · Remove the dressing, cleanse the wounds with wound cleanser or normal saline. Dress wounds with betadine and mepilex, but dress the left great toe wound with medihoney and mepilex. Dressings should be changed every 2-3 days, but left 2nd toe should be monitored daily for further necrosis.  · No soaking your foot until wound is completely healed.  Medications:   · Resume all home medications unless otherwise directed by doctor or nurse practitioner  Reasons to Call:   · Severe and persistent shortness of breath not relieved with rest  · Fever above 101.5 oF  · Redness, swelling or drainage from wounds  Follow Up Appointment:   ·  Call to schedule an appointment with your physician/surgeon after discharge  Dr. Nikolay Gilman, Dr. Jad Edwards, and Dr. Qamar Null  [phone]   [address], [place] [address]        [facility] Vascular Surgery Discharge Instructions    You underwent a left leg angiogram and balloon angioplasty of multiple arteries with Dr. Richards on 8/14/24    Activity:  No strenuous exercise or heavy lifting (over 5 pounds) until follow up with doctor   No driving while on prescription pain medications     Wound Care Instructions:  Shower daily (remove dressing before shower and re-apply afterwards if needed)  Clean incision with soap and water. No lotions, creams, perfumes, etc. directly on incision  No tub baths until incision completely healed in about 4-6 weeks     Medications:  Resume all home medications unless otherwise directed by a provider  Please continue to take your home atorvastatin  During the hospital you were started on clopidogrel (plavix). It is important that you continue taking this medication daily at home   Take tylenol 650mg every 4 hours as needed for mild pain. Take oxycodone 1 tablet every 4-6 hours as needed for severe pain.    Reasons to Call the Surgeon:  Severe and persistent pain, nausea, vomiting, diarrhea, constipation   Fever above 101.5 oF   Redness, swelling or drainage from incision     Follow Up Appointment:   Please call the vascular surgery office at [phone] to schedule an appointment after discharge    Dr. Rashel Richards    [facility]  Medical Office Building, [address]  [address] [place] [address] [phone]

## 2024-08-08 NOTE — H&P
Pulmonary  H and P         Attending Physician: Dr. Rodriguez       HISTORY OF PRESENT ILLNESS     63 y.o. female with Cutaneous Systemic Scleroderma (dx 1998) c/b ILD and Group 1 Pulmonary HTN, HFpEF, Hx of PE presenting for robotic navigational and EBUS bronchoscopies. She was found to have LLL nodules in May 2023 that was 0.9cm in size that had SUV of 4.3 that was felt to be inflammatory. She also had non-FDG avid RUL nodule and mildly increased FDG avid nodules in chest. On repeat PET/CT from 6/4/2024, the LLL nodule had grown to 1.6cm and SUV of 8.5 and mild to moderate nonspecific uptake localizing to lita-fissural nodules on L. She also noted to have moderate uptake in left hilar nodes and mediastinal nodes.    She was last seen in our office on 6/19/2024 and findings of CT/PET were discussed and decision was made to move forward with robotic navigational and EBUS bronchoscopies.       REVIEW OF SYSTEMS   A full review of systems was obtained and is negative then otherwise stated in the HPI.     PHYSICAL EXAM      Physical Exam  General: NAD, resting comfortably in bed, chronically ill appearing  Cardiac: no murmurs, rubs and gallops  Respiratory: crackles at left lung bases, normal effort  Abdominal: soft, NT  Neuro: AAOx3     Past Medical/Surgical History/Allergies/Family history/Social history    Medical History:   Past Medical History:   Diagnosis Date    At risk for falls     De Quervain's tenosynovitis     Essential (primary) hypertension     Follicular carcinoma of thyroid (CMS/HCC)     Gastro-esophageal reflux     Hand ulceration (CMS/HCC)     Hyperlipidemia, unspecified     Interstitial lung disease (CMS/HCC)     Leg ulcer (CMS/HCC)     Lung nodule     Lupus (CMS/HCC)     O2 dependent     On 2L    Osteomyelitis of hand (CMS/HCC)     Osteoporosis     Pulmonary hypertension (CMS/HCC)     PVD (peripheral vascular disease) (CMS/HCC)     Raynaud's disease     Severe    Reflux esophagitis     Scleroderma  (CMS/[facility])     Screening mammogram for breast cancer 2021    BI-RADS category: 1 - Negative (care everywhere @ [facility])    Vertigo     Wound, open, foot     bilateral,dressing change with medihoney and mupirocin ointment by homecare nurse     Surgical History:   Past Surgical History:   Procedure Laterality Date    CARDIAC CATHETERIZATION      COLONOSCOPY      HERNIA REPAIR      THYROIDECTOMY       Aspirin, Ibuprofen, Iodine, Iodine and iodide containing products, Morphine, Penicillins, Sulfa (sulfonamide antibiotics), Clindamycin, Hydrochlorothiazide, Oxycodone, Sulfamethoxazole-trimethoprim, and Latex  Family History   Problem Relation Age of Onset    Heart disease Biological Brother         stent    Breast cancer Niece     Cervical cancer Neg Hx     Uterine cancer Neg Hx     Colon cancer Neg Hx     Ovarian cancer Neg Hx      Social History     Socioeconomic History    Marital status:    Tobacco Use    Smoking status: Former     Types: Cigarettes     Quit date: 9/15/2005     Years since quittin.9    Smokeless tobacco: Never    Tobacco comments:     Would smoke 1/2 of 1/2 PPD, quit in    Vaping Use    Vaping Use: Never used   Substance and Sexual Activity    Alcohol use: Never    Drug use: Never    Sexual activity: Not Currently     Birth control/protection: Post-menopausal     Social Determinants of Health     Food Insecurity: No Food Insecurity (2024)    Hunger Vital Sign     Worried About Running Out of Food in the Last Year: Never true     Ran Out of Food in the Last Year: Never true   Transportation Needs: No Transportation Needs (2024)    PRAPARE - Transportation     Lack of Transportation (Medical): No     Lack of Transportation (Non-Medical): No   Housing Stability: Low Risk  (2024)    Housing Stability Vital Sign     Unable to Pay for Housing in the Last Year: No     Number of Places Lived in the Last Year: 1     Unstable Housing in the Last Year: No        MEDICATIONS    Home Medications:    Current Medications:  No current facility-administered medications for this encounter.        DIAGNOSTIC DATA   Lab Results   Component Value Date    WBC 10.29 07/25/2024    HGB 11.2 (L) 07/25/2024     07/25/2024     07/25/2024    K 4.3 07/25/2024    CO2 27 07/25/2024    BUN 17 07/25/2024    CREATININE 1.1 07/25/2024    ANIONGAP 9 07/25/2024        ASSESSMENT AND PLAN     63 y.o. female with Cutaneous Systemic Scleroderma (dx 1998) c/b ILD and Group 1 Pulmonary HTN, HFpEF, Hx of PE presenting for robotic navigational and EBUS bronchoscopies.     IMPRESSION:  PET avid LLL nodule  Mediastinal LN  Pulm HTN  ILD    Plan:  - Proceed with robotic navigational bronchoscopy    Please page [phone] for any questions/concerns.        Ginna Evans DO  Pulmonary & Critical Care Fellow, PGY-4

## 2024-08-09 ENCOUNTER — APPOINTMENT (INPATIENT)
Dept: RADIOLOGY | Facility: HOSPITAL | Age: 64
DRG: 515 | End: 2024-08-09
Attending: INTERNAL MEDICINE
Payer: COMMERCIAL

## 2024-08-09 ENCOUNTER — APPOINTMENT (INPATIENT)
Dept: RADIOLOGY | Facility: HOSPITAL | Age: 64
DRG: 515 | End: 2024-08-09
Payer: COMMERCIAL

## 2024-08-09 LAB
ANION GAP SERPL CALC-SCNC: 10 MEQ/L (ref 3–15)
BUN SERPL-MCNC: 18 MG/DL (ref 7–25)
CALCIUM SERPL-MCNC: 9.5 MG/DL (ref 8.6–10.3)
CHLORIDE SERPL-SCNC: 106 MEQ/L (ref 98–107)
CO2 SERPL-SCNC: 24 MEQ/L (ref 21–31)
CREAT SERPL-MCNC: 1.3 MG/DL (ref 0.6–1.2)
EGFRCR SERPLBLD CKD-EPI 2021: 46.3 ML/MIN/1.73M*2
ERYTHROCYTE [DISTWIDTH] IN BLOOD BY AUTOMATED COUNT: 18.7 % (ref 11.7–14.4)
FUNGUS STAIN: NORMAL
GLUCOSE SERPL-MCNC: 97 MG/DL (ref 70–99)
HCT VFR BLD AUTO: 36.7 % (ref 35–45)
HGB BLD-MCNC: 10.7 G/DL (ref 11.8–15.7)
LACTATE SERPL-SCNC: 1.7 MMOL/L (ref 0.4–2)
MCH RBC QN AUTO: 23.8 PG (ref 28–33.2)
MCHC RBC AUTO-ENTMCNC: 29.2 G/DL (ref 32.2–35.5)
MCV RBC AUTO: 81.7 FL (ref 83–98)
PDW BLD AUTO: 11 FL (ref 9.4–12.3)
PLATELET # BLD AUTO: 285 K/UL (ref 150–369)
POTASSIUM SERPL-SCNC: 3.6 MEQ/L (ref 3.5–5.1)
RBC # BLD AUTO: 4.49 M/UL (ref 3.93–5.22)
RHODAMINE-AURAMINE STN SPEC: NORMAL
SODIUM SERPL-SCNC: 140 MEQ/L (ref 136–145)
TROPONIN I SERPL HS-MCNC: 92.5 PG/ML
TROPONIN I SERPL HS-MCNC: 93.8 PG/ML
WBC # BLD AUTO: 9.38 K/UL (ref 3.8–10.5)

## 2024-08-09 PROCEDURE — 74176 CT ABD & PELVIS W/O CONTRAST: CPT

## 2024-08-09 PROCEDURE — 83605 ASSAY OF LACTIC ACID: CPT

## 2024-08-09 PROCEDURE — 71045 X-RAY EXAM CHEST 1 VIEW: CPT

## 2024-08-09 PROCEDURE — 80048 BASIC METABOLIC PNL TOTAL CA: CPT

## 2024-08-09 PROCEDURE — 93005 ELECTROCARDIOGRAM TRACING: CPT | Performed by: NURSE PRACTITIONER

## 2024-08-09 PROCEDURE — 84484 ASSAY OF TROPONIN QUANT: CPT | Performed by: NURSE PRACTITIONER

## 2024-08-09 PROCEDURE — 63700000 HC SELF-ADMINISTRABLE DRUG: Performed by: INTERNAL MEDICINE

## 2024-08-09 PROCEDURE — 25000000 HC PHARMACY GENERAL: Performed by: NURSE PRACTITIONER

## 2024-08-09 PROCEDURE — 63700000 HC SELF-ADMINISTRABLE DRUG: Performed by: HOSPITALIST

## 2024-08-09 PROCEDURE — 99223 1ST HOSP IP/OBS HIGH 75: CPT | Performed by: INTERNAL MEDICINE

## 2024-08-09 PROCEDURE — 99222 1ST HOSP IP/OBS MODERATE 55: CPT | Performed by: NURSE PRACTITIONER

## 2024-08-09 PROCEDURE — 25000000 HC PHARMACY GENERAL: Performed by: STUDENT IN AN ORGANIZED HEALTH CARE EDUCATION/TRAINING PROGRAM

## 2024-08-09 PROCEDURE — 99233 SBSQ HOSP IP/OBS HIGH 50: CPT | Performed by: STUDENT IN AN ORGANIZED HEALTH CARE EDUCATION/TRAINING PROGRAM

## 2024-08-09 PROCEDURE — 21400000 HC ROOM AND CARE CCU/INTERMEDIATE

## 2024-08-09 PROCEDURE — 36415 COLL VENOUS BLD VENIPUNCTURE: CPT | Performed by: NURSE PRACTITIONER

## 2024-08-09 PROCEDURE — 63700000 HC SELF-ADMINISTRABLE DRUG: Performed by: NURSE PRACTITIONER

## 2024-08-09 PROCEDURE — 85027 COMPLETE CBC AUTOMATED: CPT

## 2024-08-09 PROCEDURE — 25800000 HC PHARMACY IV SOLUTIONS: Performed by: NURSE PRACTITIONER

## 2024-08-09 PROCEDURE — 25800000 HC PHARMACY IV SOLUTIONS: Performed by: STUDENT IN AN ORGANIZED HEALTH CARE EDUCATION/TRAINING PROGRAM

## 2024-08-09 PROCEDURE — 63700000 HC SELF-ADMINISTRABLE DRUG: Performed by: STUDENT IN AN ORGANIZED HEALTH CARE EDUCATION/TRAINING PROGRAM

## 2024-08-09 RX ORDER — ACETAMINOPHEN 325 MG/1
975 TABLET ORAL EVERY 6 HOURS
Status: DISCONTINUED | OUTPATIENT
Start: 2024-08-09 | End: 2024-09-03 | Stop reason: HOSPADM

## 2024-08-09 RX ORDER — POLYETHYLENE GLYCOL 3350 17 G/17G
17 POWDER, FOR SOLUTION ORAL 2 TIMES DAILY PRN
Status: DISCONTINUED | OUTPATIENT
Start: 2024-08-09 | End: 2024-08-18

## 2024-08-09 RX ORDER — POLYETHYLENE GLYCOL 3350 17 G/17G
17 POWDER, FOR SOLUTION ORAL DAILY
Status: DISCONTINUED | OUTPATIENT
Start: 2024-08-09 | End: 2024-08-09

## 2024-08-09 RX ORDER — PREGABALIN 25 MG/1
50 CAPSULE ORAL NIGHTLY
Status: DISCONTINUED | OUTPATIENT
Start: 2024-08-09 | End: 2024-08-11

## 2024-08-09 RX ORDER — LIDOCAINE 560 MG/1
1 PATCH PERCUTANEOUS; TOPICAL; TRANSDERMAL DAILY
Status: DISCONTINUED | OUTPATIENT
Start: 2024-08-10 | End: 2024-09-03 | Stop reason: HOSPADM

## 2024-08-09 RX ORDER — ACETAMINOPHEN 325 MG/1
975 TABLET ORAL EVERY 6 HOURS PRN
Status: DISCONTINUED | OUTPATIENT
Start: 2024-08-09 | End: 2024-08-09

## 2024-08-09 RX ORDER — SENNOSIDES 8.6 MG/1
2 TABLET ORAL 2 TIMES DAILY
Status: DISCONTINUED | OUTPATIENT
Start: 2024-08-10 | End: 2024-08-19

## 2024-08-09 RX ORDER — POLYETHYLENE GLYCOL 3350 17 G/17G
17 POWDER, FOR SOLUTION ORAL 2 TIMES DAILY
Status: DISCONTINUED | OUTPATIENT
Start: 2024-08-10 | End: 2024-08-19

## 2024-08-09 RX ADMIN — AMLODIPINE BESYLATE 10 MG: 10 TABLET ORAL at 09:05

## 2024-08-09 RX ADMIN — LIDOCAINE 4% 1 PATCH: 40 PATCH TOPICAL at 09:06

## 2024-08-09 RX ADMIN — SALINE NASAL SPRAY 2 SPRAY: 1.5 SOLUTION NASAL at 10:15

## 2024-08-09 RX ADMIN — LEVOTHYROXINE SODIUM 100 MCG: 0.1 TABLET ORAL at 09:05

## 2024-08-09 RX ADMIN — ACETAMINOPHEN 975 MG: 325 TABLET ORAL at 23:07

## 2024-08-09 RX ADMIN — ACETAMINOPHEN 975 MG: 325 TABLET ORAL at 03:06

## 2024-08-09 RX ADMIN — LIDOCAINE 4% 1 PATCH: 40 PATCH TOPICAL at 23:54

## 2024-08-09 RX ADMIN — WHITE PETROLATUM: 1.75 OINTMENT TOPICAL at 12:24

## 2024-08-09 RX ADMIN — ETHACRYNATE SODIUM 50 MG: 50 INJECTION, POWDER, LYOPHILIZED, FOR SOLUTION INTRAVENOUS at 17:47

## 2024-08-09 RX ADMIN — SILDENAFIL 20 MG: 20 TABLET, FILM COATED ORAL at 13:47

## 2024-08-09 RX ADMIN — ACETAMINOPHEN 975 MG: 325 TABLET ORAL at 17:47

## 2024-08-09 RX ADMIN — ETHACRYNATE SODIUM 50 MG: 50 INJECTION, POWDER, LYOPHILIZED, FOR SOLUTION INTRAVENOUS at 12:24

## 2024-08-09 RX ADMIN — CYANOCOBALAMIN TAB 1000 MCG 1000 MCG: 1000 TAB at 09:06

## 2024-08-09 RX ADMIN — SILDENAFIL 20 MG: 20 TABLET, FILM COATED ORAL at 21:27

## 2024-08-09 RX ADMIN — PANTOPRAZOLE SODIUM 20 MG: 20 TABLET, DELAYED RELEASE ORAL at 09:05

## 2024-08-09 RX ADMIN — SENNOSIDES 2 TABLET: 8.6 TABLET, FILM COATED ORAL at 23:55

## 2024-08-09 RX ADMIN — SILDENAFIL 20 MG: 20 TABLET, FILM COATED ORAL at 09:05

## 2024-08-09 RX ADMIN — ACETAMINOPHEN 975 MG: 325 TABLET ORAL at 11:57

## 2024-08-09 ASSESSMENT — COGNITIVE AND FUNCTIONAL STATUS - GENERAL
CLIMB 3 TO 5 STEPS WITH RAILING: 2 - A LOT
STANDING UP FROM CHAIR USING ARMS: 3 - A LITTLE
MOVING TO AND FROM BED TO CHAIR: 3 - A LITTLE
WALKING IN HOSPITAL ROOM: 3 - A LITTLE
MOVING TO AND FROM BED TO CHAIR: 3 - A LITTLE
STANDING UP FROM CHAIR USING ARMS: 3 - A LITTLE
CLIMB 3 TO 5 STEPS WITH RAILING: 2 - A LOT
WALKING IN HOSPITAL ROOM: 3 - A LITTLE

## 2024-08-09 NOTE — PROGRESS NOTES
Hospital Medicine Service -  Daily Progress Note       SUBJECTIVE   Patient still with bony pain as described by her this morning.  Cardiology in the room interviewing along with us.  Per cardiology, she has had this pain for several months has been an ongoing issue trying to control it.    She also has numerous allergies to various medications including NSAIDs as well as a bad reaction to morphine.  She is also at risk of scleroderma renal injury from previous course of steroids and as such we are avoiding those also.    Patient's breathing seems to be at baseline.   OBJECTIVE      Vital signs in last 24 hours:  Temp:  [36.2 °C (97.1 °F)-37.6 °C (99.7 °F)] 36.8 °C (98.2 °F)  Heart Rate:  [] 69  Resp:  [12-20] 18  BP: (111-153)/(58-86) 139/63    Intake/Output Summary (Last 24 hours) at 8/9/2024 1028  Last data filed at 8/8/2024 2345  Gross per 24 hour   Intake 940 ml   Output 201 ml   Net 739 ml       PHYSICAL EXAMINATION      NAD. Head NCAT. Heart RRR. No murmurs.  Tenderness to palpation of rib head area lungs CTAB. No wheezes or crackles. Abdomen soft, non tender. LE no edema. Skin with no lesions, mood affect appropriate.     Scleroderma evident face, arms, lower extremities.  Hypoplasia of the right fifth digit, not sure if the other digits are also hypoplastic or had been amputated at some point    Also has dark necrosis of the left third digit of the foot   LINES, CATHETERS, DRAINS, AIRWAYS, AND WOUNDS   Lines, Drains, and Airways:  Wounds (agree with documentation and present on admission):  Peripheral IV (Adult) 08/08/24 Anterior;Left;Proximal Forearm (Active)   Number of days: 1       Wound Anterior;Right Toe (2nd) (Active)   Number of days: 1       Wound Anterior;Left Toe (Great) (Active)   Number of days: 1       Wound Anterior;Left Toe (2nd) (Active)   Number of days: 1         Comments:      LABS / IMAGING / TELE          ASSESSMENT AND PLAN      Chest pain  Assessment & Plan  Chest pain has  been ongoing  Exam and history suggest MSK  Pain control is difficult as patient has various allergies, including morphine, oxycodone, NSAIDs.    She has a lidocaine patch, she does not think it is helping much  Consulted pain  - She does not seem to have allergies to gabapentin/Flexeril, Lyrica, could try to start with these    Cardiology consulted    Interstitial lung disease (CMS/[facility])  Assessment & Plan  Does not appear to be in exacerbation for this  Continue supportive care    Acute on chronic heart failure with preserved ejection fraction (CMS/[facility])  Assessment & Plan  Unclear if this is completely due to heart.  Regardless she is volume overloaded    Cardiology on  Advised IVF to chronic acid  Follow intake and output  Check x-ray tomorrow morning    Scleroderma (CMS/[facility])  Assessment & Plan  Again, chronic  Tocilizumab every 28 days    Pulmonary hypertension (CMS/[facility])  Assessment & Plan  Chronic, extensive history    Having her daughter bring in her macitentan  On sildenafil  Also offloading with ethacrynic acid    Essential (primary) hypertension  Assessment & Plan  Pressure stable here, continue home medications      L foot wound  Necrosis L tip of phallanx  ENRRIQUE/ arterial US per cards  Pods to eval toe       VTE Assessment: Padua    VTE Prophylaxis:  Current anticoagulants:    None      Code Status: Full Code      Estimated Discharge Date: 8/10/2024   Disposition Planning: Hopeful DC tomorrow     Grant Watts, DO  8/9/2024

## 2024-08-09 NOTE — ASSESSMENT & PLAN NOTE
EF 65%  Known to Cardiology Dr. Arvizu, in-house consult placed  S/p IV ethacrynic acid, now back on PO  Continue pHTN meds; opsumit, revatio  CHF order set, daily weights, salt/fluid restriction, strict Is&Os

## 2024-08-09 NOTE — PROGRESS NOTES
Critical Care Progress Note     SUBJECTIVE  Interval History:     Feels tired   Musculoskeletal pain   No cough or dyspnea    Allergies: Aspirin, Ibuprofen, Iodine, Iodine and iodide containing products, Morphine, Penicillins, Sulfa (sulfonamide antibiotics), Clindamycin, Hydrochlorothiazide, Oxycodone, Sulfamethoxazole-trimethoprim, and Latex    CURRENT MEDS    acetaminophen, 975 mg, oral, q6h PRN    amLODIPine, 10 mg, oral, q AM    atorvastatin, 40 mg, oral, Daily (6p)    benzonatate, 100 mg, oral, 3x daily PRN    cyanocobalamin, 1,000 mcg, oral, Daily    glucose, 16-32 g of dextrose, oral, PRN **OR** dextrose, 15-30 g of dextrose, oral, PRN **OR** glucagon, 1 mg, intramuscular, PRN **OR** dextrose 50 % in water (D50), 25 mL, intravenous, PRN    docusate sodium, 100 mg, oral, Daily PRN    levothyroxine, 100 mcg, oral, Daily    lidocaine, 1 patch, Topical, Daily    lidocaine, 1 patch, Topical, Daily    pantoprazole, 20 mg, oral, Daily    sildenafiL (pulm.hypertension), 20 mg, oral, TID    sodium chloride, 2 spray, Each Nostril, Daily    sodium chloride 0.9 %, 40 mL/hr, intravenous, Continuous    vasopressin, 0.03 Units/min, intravenous, Continuous    ethacrynic acid    VITAL SIGNS  Vitals:    08/09/24 0029 08/09/24 0243 08/09/24 0416 08/09/24 0845   BP: (!) 141/65 129/62  139/63   BP Location: Right upper arm Right upper arm  Right upper arm   Patient Position: Lying Lying  Lying   Pulse: 72 62 63 69   Resp: 20 16  18   Temp:  36.8 °C (98.2 °F)  36.8 °C (98.2 °F)   TempSrc: Oral Oral  Oral   SpO2: 98% 99%  96%   Weight:       Height:           VENTILATOR SETTINGS       INTAKE/OUTPUT    Intake/Output Summary (Last 24 hours) at 8/9/2024 0951  Last data filed at 8/8/2024 2345  Gross per 24 hour   Intake 940 ml   Output 201 ml   Net 739 ml       Lines, Drains, Airways, Wounds:  Peripheral IV (Adult) 08/08/24 Anterior;Left;Proximal Forearm (Active)   Number of days: 1       Wound Anterior;Right Toe (2nd) (Active)    Number of days: 1       Wound Anterior;Left Toe (Great) (Active)   Number of days: 1       Wound Anterior;Left Toe (2nd) (Active)   Number of days: 1       PHYSICAL EXAM  Severe scleroderma  Lungs with bilateral crackles  Heart RRR   Abd soft  Ext severe scleroderma changes    LAB RESULTS  ABG    CBC    BMP    Coag      IMAGING  No PTX post biopsy  Repeat pending today        ASSESSMENT & PLAN  63-year-old female patient with severe scleroderma complicated by pulmonary hypertension.  Found to have a left lower lobe PET avid lung nodule with associated mediastinal adenopathy.  She underwent Ion navigation bronchoscopy yesterday with endobronchial ultrasound.  Postoperatively she developed chest discomfort with chest x-ray demonstrating increased congestive heart failure.    She was admitted overnight for further evaluation and care.    Chest x-ray has not demonstrated pneumothorax.  It has demonstrated pulmonary edema for which she  has received increased diuretic therapy.    Recommendations  -Repeat chest x-ray pending today  -Cardiology consultation-Dr. Arvizu  -No other changes in medications  -Possible discharge to home today            CODE STATUS  Full Code          Joel Rodriguez MD  8/9/2024

## 2024-08-09 NOTE — PLAN OF CARE
Plan of Care Review  Plan of Care Reviewed With: patient  Progress: no change  AAOX4, PRN tylenol given once for moderate to severe left flank pain. Lidocaine patch applied.  Pt is currently sleeping comfortable. No acute distress reported, Pt denies dizziness, shortness of breath at rest, nausea or vomiting. SPO2 stable on 3L NC. Assist x 1 OOB. Sinus rhythm on the monitor, VSS. Fall preventions maintained and call light within reach.

## 2024-08-09 NOTE — PROGRESS NOTES
Called to the bedside by nursing for evaluation of sudden onset lower abdominal pain which seems to localize to the right lower quadrant.  Vital signs are stable, afebrile (although on ATC Tylenol).  She does not appear to be in distress, she is nontoxic-appearing.  Denies any associated fever/chills/nausea/vomiting/diarrhea.  She had just fully emptied her bladder with no urinary symptoms.  Has had normal bowel movements daily.  Her abdomen is slightly distended and tender to palpation so I ordered abdominal CT without contrast to further evaluate as well as CBC, BMP and lactate since no labs checked the last several days.

## 2024-08-09 NOTE — CONSULTS
Pain Service Consult Note                Chief Complaint: chest pain    Pain Level: (1-10 scale): 9/10    History of Present Illness:  Arielle Felix is a 63 y.o. female with history significant for chronic chest pain, Raynaud's Disease, Pulmonary HTN, HTN, , Cutaneous Systemic Scleroderma, ILD with home O2,  and PE. The patient was admitted to [facility] on 8/8 for pulmonary edema. Chest xray completed on 8/5 showing Cardiomegaly with continued left basilar airspace opacity and interstitial opacities.  8/8 left lung biopsy completed.   The Pain Service has been consulted in hope of pain management.      Pain Characteristics  Pain location: left chest wall  Pain Radiation: left lateral chest  Pain timing: constant  Pain severity: Worst: 10/10, Best: 9/10, Average: 9/10  Pain description: heavy, spasms   Pain relieved by:  Pain exacerbated by: breathing  Numbness/Tingling:  Weakness :  Sleep: + disrupted  Mood: + depressed  Bowel and bladder:      Patient has tried the following treatments:  Medications:   Opioids:   Neuropathics:   Muscle Relaxants:   NSAID's: none/allergy    Tylenol: +   Other:   Procedures:   Pain Procedures:   Surgery:  Physical Therapy:   Psych:  Other:    Functional goals are:  1) Decreased pain at rest  2) Decreased pain with breathing  3) Decreased pain with activity       Past Medical History:  Patient Active Problem List   Diagnosis    Immunocompromised state (CMS/HCC)    Open wound of right foot    Essential (primary) hypertension    Hyperlipidemia, unspecified    Raynaud's disease    Pulmonary hypertension (CMS/HCC)    Shortness of breath    Interstitial lung disease (CMS/HCC)    Scleroderma (CMS/HCC)    Right ventricular dysfunction    Pericardial effusion    Hemoptysis    History of pulmonary embolism    Vertigo    Debility    Dyspnea    Acute on chronic heart failure with preserved ejection fraction (CMS/HCC)    Anemia    Palliative care by specialist    Hypothyroid     Nausea and vomiting    Acute chest pain    GERD (gastroesophageal reflux disease)    Lung mass    CAD (coronary artery disease)    Chest pain    Multiple open wounds of lower extremity    Acute hypoxic respiratory failure (CMS/HCC)    Pulmonary edema, acute (CMS/HCC)    Pleural effusion     Past Medical History:   Diagnosis Date    At risk for falls     De Quervain's tenosynovitis     Essential (primary) hypertension     Follicular carcinoma of thyroid (CMS/HCC)     Gastro-esophageal reflux     Hand ulceration (CMS/HCC)     Hyperlipidemia, unspecified     Interstitial lung disease (CMS/HCC)     Leg ulcer (CMS/HCC)     Lung nodule     Lupus (CMS/HCC)     O2 dependent     On 2L    Osteomyelitis of hand (CMS/HCC)     Osteoporosis     Pulmonary hypertension (CMS/HCC)     PVD (peripheral vascular disease) (CMS/HCC)     Raynaud's disease     Severe    Reflux esophagitis     Scleroderma (CMS/HCC)     Screening mammogram for breast cancer 05/04/2021    BI-RADS category: 1 - Negative (care everywhere @ [place])    Vertigo     Wound, open, foot     bilateral,dressing change with medihoney and mupirocin ointment by homecare nurse     Past Surgical History:   Procedure Laterality Date    CARDIAC CATHETERIZATION      COLONOSCOPY      HERNIA REPAIR      THYROIDECTOMY           Imaging:  X-RAY CHEST 1 VIEW    Result Date: 8/9/2024  IMPRESSION: Stable.     X-RAY CHEST 1 VIEW    Result Date: 8/8/2024  IMPRESSION: 1.  Stable marked cardiomegaly. 2.  Airspace opacities similar to prior.     FL FLUOROSCOPY TECHNICAL ASSISTANCE    Result Date: 8/8/2024  IMPRESSION: Technical assistance was provided for fluoroscopy, reference operative note.  Fluoro time is 3.4 minutes. Dose is 108.40 mGy.    X-RAY CHEST 1 VIEW    Result Date: 8/8/2024  IMPRESSION: 1. No pneumothorax following left lung biopsy. 2. Diffuse interstitial infiltrates with alveolar infiltrate throughout the majority of the left lung, and moderate size layering left pleural  effusion. COMMENT:  Portable chest x-ray is compared with 8/5/2024. There is cardiomegaly with diffuse bilateral interstitial infiltrates. Alveolar infiltrate is present throughout the majority of the left lung, with layering moderate-sized pleural effusion. The right lung is partially obscured due to patient rotation. There is no pneumothorax.    X-RAY CHEST 1 VIEW    Result Date: 8/5/2024  IMPRESSION: Cardiomegaly with continued left basilar airspace opacity and interstitial opacities.    CT ANGIOGRAPHY CHEST PULMONARY EMBOLISM WITH IV CONTRAST    Addendum Date: 7/25/2024    IMPRESSION ADDENDUM: No evidence for pulmonary embolism. Persistent left-sided pleural effusion and airspace disease at the left lung base.  There is a heterogeneous nodule in the medial aspect of the left lower lobe measuring 1.6 x 1.9 cm.  This area demonstrated increased PET uptake on previous study. Underlying interstitial lung disease.     Result Date: 7/25/2024  IMPRESSION: No evidence for pulmonary embolism. Persistent left-sided pleural effusion and airspace disease at the left lung base. Underlying interstitial lung disease.     CT ABDOMEN PELVIS WITH IV CONTRAST    Result Date: 7/25/2024  IMPRESSION: No evidence for acute inflammatory obstructive process in the abdomen and pelvis.  Specifically no evidence for diverticulitis.  There is moderate fecal retention within the colon. Left lower pole renal lesion which likely reflects a hemorrhagic/proteinaceous cyst.  Please see discussion below. COMMENT: Technique: CT examination of the abdomen and pelvis was performed utilizing contiguous 2.5 mm transaxial sections. Coronal and sagittal reformations are obtained. Following medication was administered: 100mL of iopamidoL (ISOVUE-370) 370 mg iodine /mL (76 %) injection 100 mL CT DOSE:  One or more dose reduction techniques (e.g. automated exposure control, adjustment of the mA and/or kV according to patient size, use of iterative  reconstruction technique) utilized for this examination Comparison studies: CT from 11/27/2020.  Recent PET scan from 6/14/2024. Lung bases: Left-sided pleural effusion, left parenchymal consolidation, and findings of interstitial lung disease.  More nodular density in the medial aspect of the left lower lobe measuring 1.6 x 1.9 cm.  Cardiomegaly with a pericardial effusion. Liver: The liver is normal in size.  There is no focal mass.  Hepatic veins and portal veins are patent without focal filling defects to suggest thrombosis.. Gallbladder:  Small dependent gallstones.  No gallbladder wall thickening.. Spleen: Spleen is normal in size without focal mass lesion.. Pancreas: The pancreas is normal without focal mass, parenchymal atrophy, or pancreatic duct dilation.. Adrenals: The adrenals are normal bilaterally without focal mass.. Kidneys, ureters and bladder:  Symmetric enhancement and excretion..  Left lower pole cystic lesion measures 3.2 cm in maximal dimension.  This measures greater than simple fluid density.  This measured 60 Hounsfield units on the noncontrast CT from 2020.  It measures 36 Hounsfield units on today's study.  Therefore it is most in keeping with a hemorrhagic/proteinaceous cyst.  Additional hypodensities too small to characterize. Retroperitoneal structures: There is no abdominal aortic aneurysm. Atherosclerotic calcification.  There is no retroperitoneal adenopathy or retroperitoneal mass.. Bowel and mesentery: No evidence of a focal inflammatory or obstructive process. Moderate fecal retention throughout the colon.  There are a few fluid-filled small bowel loops in the pelvis, nonspecific. Lymph nodes: No pathologic lymphadenopathy.. Pelvis: The uterus is normal in size.  The ovaries are normal.  There is no adnexal mass or pelvic free fluid. Bones: Within normal limits.    X-RAY CHEST 1 VIEW    Result Date: 7/25/2024  IMPRESSION: Decreased left pleural effusion. Unchanged pulmonary edema  in setting of cardiomegaly.     ULTRASOUND CHEST    Result Date: 7/23/2024  IMPRESSION: Thoracentesis deferred. This service rendered by Zohra Vidal PA-C I certify that I have personally reviewed this examination and agree with Zohra Vidal's report. Russ Rodriguez M.D.     CT HEAD WITHOUT IV CONTRAST    Result Date: 7/21/2024  IMPRESSION: 1.  No evidence of acute territorial infarction, acute intracranial hemorrhage, or mass effect. 2.  Partial opacification of the paranasal sinuses which may be secondary to sinusitis.    CT ANGIOGRAPHY CHEST PULMONARY EMBOLISM WITH IV CONTRAST    Result Date: 7/19/2024  IMPRESSION: 1.  Negative evaluation for pulmonary embolism. 2.  New small left pleural effusion. Increased irregular left basilar airspace disease and ongoing 16mm nodule, which was hypermetabolic on recent PET/CT. Malignancy with pleural metastatic disease is not excluded. Consider diagnostic thoracentesis. 3.  New/increased airspace disease also in the dependent and basilar right lung suggesting pneumonia or pneumonitis. 4.  Suspected mild pulmonary edema. 5.  Moderate pericardial effusion, mildly increased. 6.  New small partially loculated left pleural effusion. 7.  Mildly enlarged mediastinal and hilar lymph nodes, which may be inflammatory or metastatic in nature. In accordance with [place] Act 112,  the patient will receive a letter notifying them to follow up with their physician.    X-RAY CHEST 1 VIEW    Result Date: 7/19/2024  IMPRESSION: Unchanged marked cardiomegaly (with known pericardial effusion on prior CT) and vascular congestion with diffuse interstitial opacity likely reflecting mild edema. Coarse basilar interstitial opacities related to known fibrotic lung disease is less well appreciated radiographically. Left basilar pleural-parenchymal opacity probably related to a concomitant small pleural effusion and associated retrocardiac atelectasis or consolidation.        Diagnostic Test Performed and  its Result and the Date Performed:                    Medications:    Current Facility-Administered Medications:     acetaminophen (TYLENOL) tablet 975 mg, 975 mg, oral, q6h SPENCER, Molly Hidalgo, KARYNA, 975 mg at 08/09/24 1157    amLODIPine (NORVASC) tablet 10 mg, 10 mg, oral, q AM, Joel Rodriguez MD, 10 mg at 08/09/24 0905    atorvastatin (LIPITOR) tablet 40 mg, 40 mg, oral, Daily (6p), Joel Rodriguez MD    benzonatate (TESSALON) capsule 100 mg, 100 mg, oral, 3x daily PRN, Joel Rodriguez MD    cyanocobalamin (VITAMIN B12) tablet 1,000 mcg, 1,000 mcg, oral, Daily, Joel Rodriguez MD, 1,000 mcg at 08/09/24 0906    glucose chewable tablet 16-32 g of dextrose, 16-32 g of dextrose, oral, PRN **OR** dextrose 40 % oral gel 15-30 g of dextrose, 15-30 g of dextrose, oral, PRN **OR** glucagon (GLUCAGEN) injection 1 mg, 1 mg, intramuscular, PRN **OR** dextrose 50 % in water (D50) injection 12.5 g, 25 mL, intravenous, PRN, Joel Rodriguez MD    docusate sodium (COLACE) capsule 100 mg, 100 mg, oral, Daily PRN, Joel Rodriguez MD    honey (MEDIHONEY) 100 % topical paste, , Topical, Daily PRN, Nelson Pratt, MERRY    levothyroxine (SYNTHROID) tablet 100 mcg, 100 mcg, oral, Daily, Joel Rodriguez MD, 100 mcg at 08/09/24 0905    lidocaine (ASPERCREME) 4 % topical patch 1 patch, 1 patch, Topical, Daily, Joel Rodriguez MD, 1 patch at 08/09/24 0906    lidocaine (ASPERCREME) 4 % topical patch 1 patch, 1 patch, Topical, Daily, Joel Rodriguez MD, 1 patch at 08/09/24 0906    pantoprazole (PROTONIX) tablet,delayed release (DR/EC) 20 mg, 20 mg, oral, Daily, Joel Rodriguez MD, 20 mg at 08/09/24 0905    sildenafiL (pulm.hypertension) (REVATIO) tablet 20 mg, 20 mg, oral, TID, Joel Rodriguez MD, 20 mg at 08/09/24 1347    sodium chloride (OCEAN) 0.65 % nasal spray 2 spray, 2 spray, Each Nostril, Daily, Joel Rodriguez MD, 2 spray at 08/09/24 1015    white petrolatum (AQUAPHOR) ointment, , Topical, q4h PRN, Molly Hidalgo CRNP,  Given at 24 1224    Current Disability Status:    Social History:  Social History     Socioeconomic History    Marital status:      Spouse name: None    Number of children: None    Years of education: None    Highest education level: None   Tobacco Use    Smoking status: Former     Types: Cigarettes     Quit date: 9/15/2005     Years since quittin.9    Smokeless tobacco: Never    Tobacco comments:     Would smoke 1/2 of 1/2 PPD, quit in    Vaping Use    Vaping Use: Never used   Substance and Sexual Activity    Alcohol use: Never    Drug use: Never    Sexual activity: Not Currently     Birth control/protection: Post-menopausal     Social Determinants of Health     Food Insecurity: No Food Insecurity (2024)    Hunger Vital Sign     Worried About Running Out of Food in the Last Year: Never true     Ran Out of Food in the Last Year: Never true   Transportation Needs: No Transportation Needs (2024)    PRAPARE - Transportation     Lack of Transportation (Medical): No     Lack of Transportation (Non-Medical): No   Housing Stability: Low Risk  (2024)    Housing Stability Vital Sign     Unable to Pay for Housing in the Last Year: No     Number of Places Lived in the Last Year: 1     Unstable Housing in the Last Year: No         Family History:  Family History   Problem Relation Age of Onset    Heart disease Biological Brother         stent    Breast cancer Niece     Cervical cancer Neg Hx     Uterine cancer Neg Hx     Colon cancer Neg Hx     Ovarian cancer Neg Hx          Review of Systems:  Defer to primary team for full ROS as well as HPI    Physical Examination:  Temp:  [36.2 °C (97.1 °F)-36.8 °C (98.3 °F)] 36.4 °C (97.5 °F)  Heart Rate:  [] 81  Resp:  [16-20] 18  BP: (111-153)/(58-88) 147/88    Appearance: Alert and oriented X3, verbally appropriate, frail, appears older than her age  Skin: Face with erythema  Extremities: WWP  Neck: Supple, no adenopathy, Full ROM, trapezius  tenderness -, rhomboid tenderness -, paracervical tenderness -  Back: paraspinal tenderness -, SLR - on left, SLR - on right  Active range of motion: Full  Manual Muscle Power Testin/5 strength in all major muscle groups in upper and lower extremities bilaterally, poor hand grasp   Sensation: decreased to feet/toes  Left foot ulcer     Gait: deferred, patient in bed    Assessment and Plan:  Based on today's, evaluation, I believe this patient is suffering from the following:    Chest pain   2.   Pulmonary Hypertension  3.   Raynaud's Disease  4.   Chronic pain syndrome       I believe this patient will greatly benefit from a comprehensive, interdisciplinary approach to the assessment and management of their underlying chronic pain condition.     Based upon the above diagnoses the following treatment recommendations are made:    Medications:  We will utilize a multi-modal medication plan focused on opioid sparing and improving functional capacity. The components are as follows:  Acetaminophen: 975mg every 6 hours   NSAID: None/allergy   Muscle Relaxant: Flexeril 5mg at bedtime, BID prn   Neuropathic: Lyrica 75mg BID (patient reports problems with gabapentin)  Opioid: Tramadol 50mg QID prn   Other: Lidocaine 5% patch topical daily    Procedures: 2024, with Dr. Niles Rodriguez. Pathology showed atypical glandular cells which may be consistent with adenocarcinoma in situ.     Rehab:  PT    Psych:      Imaging: Chest xray, 2024:     TECHNIQUE: Frontal chest   COMMENT:  There is cardiomegaly and increased pulmonary vascular and  interstitial markings and moderate left effusion. No pneumothorax.   --  IMPRESSION: Stable.      Other:     **Recommendations are not final until co-signed by the supervising physician**

## 2024-08-09 NOTE — NURSING NOTE
New onset abdominal pain, RAS Chang made aware, new order for abd xray     Vital signs: /82 HR 98

## 2024-08-09 NOTE — CONSULTS
I had the pleasure of seeing Arielle Felix (: 1960) in the [facility] Heart Failure and Pulmonary Hypertension clinic on 2024. She presents today with her care giver, Sara.     Ms. Felix is a 63 y.o. female with a past medical history of Pulmonary HTN, HTN, Raynaud's Disease, Cutaneous Systemic Scleroderma (dx ), ILD, PE (3/2023). She is a patient of Dr. Nguyễn. She was previously followed by Dr. Jos Valdez at [facility]. Echocardiogram from 2022 showed LVEF: 55-60%, mild aortic valve thickening, pulmonary artery systolic pressure: 52 mmHg and trivial pericardial effusion. LHC and RHC (separate occasions) over the last 5-10 years which showed normal hemodynamics and normal coronaries. She had a RHC 2020 showing top-normal right sided filling pressures with mild pulmonary hypertension, top-normal PVR and normal pulmonary capillary wedge pressure. The cardiac index was normal, seen below.     On 2022, she was in [facility] ER for chest pain. EKG: NSR without ischemic changes. hsTrop: 12->16, d-dimer: 0.56, CXR showed cardiomegaly and diffuse interstitial prominence.     Dr. Nguyễn ordered echocardiogram, which was completed on 2022 and showed estimated RVSP = 50-55 mmHg, normal-sized LV with normal LV systolic function. Estimated EF 55- 60%. Wall motion grossly normal, mild concentric left ventricular hypertrophy, grade I LV diastolic dysfunction, probably normal RV size and function.    At her initial visit on 10/11/2022, she reported that she felt very short of breath with minimal activity most of the time. She reports that this started about 1 year prior and had progressively gotten worse. She described PND and bendopnea. She reported that she experienced ankle swelling, worsened over the past year and usually worse towards the end of the night. She also reported some swelling in her abdomen. She reported daily palpations. I recommended a RHC which was done  11/28/2022 and showed: Normal right sided filling pressures. Mildly elevated left sided filling pressures. Precapillary pulmonary hypertension with mean PA 36 mmHg.    She was hospitalized on 3/19/2023 at [facility] for PE diagnosed on V/Q scan. She reports that she had presented with left arm pain and heaviness. She states that she was started on Apixaban. One week after her last office visit (4/6/2023), she presented [place] [facility] with chest pain, epistaxis and hemoptysis. Echocardiogram showed: left ventricular cavity size normal with mild left ventricular hypertrophy and preserved systolic function. Estimated EF 65%. Chest CTA showed no evidence of PE; Apixaban was discontinued.     She had a repeat echocardiogram (6/2023) which showed: Normal left ventricular cavity size and mild concentric left ventricular hypertrophy. Normal systolic function. EF: 60%. Normal right ventricular structure and function. Mild tricuspid valve regurgitation with estimated RVSP: 55 mmHg. Compared to previous study from 4/14/2023, estimated right ventricular systolic pressure were higher.    She underwent V/Q scan in September 2023, which showed intermediate-to-high probability of pulmonary embolic disease, as a result she completed CTA Chest PE which showed: no evidence for filling defect or vessel cutoff to suggest pulmonary embolism. Enlargement of the pulmonary arteries compatible with pulmonary arterial hypertension was seen.    She reports she started Macitentan around August or September 2023. She stated that she had not been checking her SpO2. She reports she did not notice any difference since starting it. She reported she had been feeling more shortness of breath at times with ADLs.     She presented to [facility] on 3/3/2024 with chest pressure, palpitations and nausea. She was noted to have hypoxia and tachypnea at presentation. She was found to have moderate-to-large pericardial effusion without tamponade. She was  started on Colchicine 0.6 mg BID on 3/4/2024 and when she did not improve symptomatically, Prednisone 20 mg daily was added on 3/5/2024. On 3/7/2024, she developed multiple episodes of diarrhea and Colchicine dose was decreased to 0.3 mg. Coronary CTA, done as part of the ischemic workup, showed moderate 2 vessel CAD. She was recommended to begin Atorvastatin 20 mg daily and Clopidogrel 75 mg daily. She initially refused those two medications, but eventually agreed to take Atorvastatin.      She was discharged on Colchicine 0.3 mg for at least 3 months and Prednisone 20 mg for a total two week course until 3/19/2024, then decrease the dose to 10 mg daily for 2 weeks until 4/2/2024, then decrease dose to 5 mg daily for 2 weeks until 4/16/2024. She reports she tapered off Prednisone as instructed and has continued to take Colchicine as prescribed. She completed an echocardiogram (4/10/2024) which showed: Small-to-moderate sized, circumferential pericardial effusion. No echocardiographic evidence for cardiac tamponade. Compared to previous study from 3/4/2024, no significant change. Pericardial effusion size was similar, estimated right atrial pressure lower.      [place] [facility] [place] with increased pleuritic chest pain. On presentation to the ER, she was noted to be hypertensive and tachycardic. Labs showed mildly elevated troponin, elevated BNP and leukocytosis. CT angiography of the chest did not show evidence of PE. It was read as large pericardial effusion, evidence of emphysema, evidence of pulmonary hypertension, 16 mm left lobe lung mass.       She presented [place] [facility] [place] at the request of her Rheumatologist. During her visit there, she was noted to be tachycardic and with some shortness of breath. Her chest pain was improved from her prior admission. On arrival, O2 saturations were normal. ECG showed: Sinus Tachycardia (HR: 125 bpm). BNP >300, hsTrop 32 ->24. POCUS in the emergency  "department showed evidence of pericardial effusion with no tamponade physiology. CTA Chest did not show evidence of pulmonary embolism. There were bilateral changes radiographically concerning for volume overload. She received Methylpredinsolone 125 mg in ED. Of note, she was on Prednisone 10 mg daily at home. She was given IV diuresis. She was discharged on Furosemide 20 mg PO PRN daily for fluid weight gain / swelling.     She had brief hospitalization and was discharged on 7/23 for chest pain. She presented to the ED on 7/25/2024 for worsening of her chronic chest pain and 8/5/2024 for facial swelling and chronic chest pain.      She underwent bronchoscopic lung biopsy yesterday, 8/8/2024, with Dr. Niles Rodriguez. Pathology showed atypical glandular cells which may be consistent with adenocarcinoma in situ.     Post-procedure CXR showed increase pulmonary vascular congestion. She was admitted for optimization.     This AM, she reports stable, chronic chest pain. She describes on-going facial swelling. She reports noticing small increase in urination with increased Ethacrynic Acid. She denies shortness of breath at rest. She denies chest pressure. She denies light-headedness. She notes ulceration with discoloration on 2nd toe of each foot.     ---    Rheumatology: Dr. Trevizo  Pulmonology: Dr. Wasserman    Past Medical / Surgical History:  Pulmonary HTN, HTN, Raynaud's Disease, Cutaneous Systemic Scleroderma (dx 1998), ILD, PE (3/2023), Follicular Carcinoma of Thyroid, Tenosynovitis, de Quervain, h/o Hernia Repair, h/o Appendicitis, h/o Digit Amputation, H/o Total Thyroidectomy (Dr. [facility] [place]; 4/2013)    Family & Social History: She is , she has two children. She has worked as an .     Tobacco: former 2005  EtOH: never   Illicits: never     Her brother has CAD with stent  1st cousin with SLE  Both parents had \"heart problems\"    Allergies:   Allergies   Allergen Reactions    Aspirin Shortness of " "breath     Other reaction(s): Unknown  \"stop breathing\"      Ibuprofen Shortness of breath     Stop breathing    Iodine Shortness of breath     Other reaction(s): Unknown    Iodine And Iodide Containing Products Shortness of breath     ? rash    Morphine Shortness of breath      Reported wamrth of face, improved with benadryl and cold compresses after getting morphine as well as episode of (subjective ) dyspnea, and thought she passed out - did have wamrth and erythema of face with mild edema R>L cheek notably on exam.     Penicillins Shortness of breath     Other reaction(s): Unknown    Sulfa (Sulfonamide Antibiotics) Angioedema    Clindamycin     Hydrochlorothiazide      Other reaction(s): Abdominal Pain   Slow heart rate    Oxycodone      Other reaction(s): Vomiting    Sulfamethoxazole-Trimethoprim      Other reaction(s): Vomiting, Weakness     Latex Rash       Medications:   Current Facility-Administered Medications   Medication Dose Route Frequency Provider Last Rate Last Admin    acetaminophen (TYLENOL) tablet 975 mg  975 mg oral q6h [facility] Molly Hidalgo CRNP   975 mg at 08/09/24 1157    amLODIPine (NORVASC) tablet 10 mg  10 mg oral q AM Joel Rodriguez MD   10 mg at 08/09/24 0905    atorvastatin (LIPITOR) tablet 40 mg  40 mg oral Daily (6p) Joel Rodriguez MD        benzonatate (TESSALON) capsule 100 mg  100 mg oral 3x daily PRN Joel Rodriguez MD        cyanocobalamin (VITAMIN B12) tablet 1,000 mcg  1,000 mcg oral Daily Joel Rodriguez MD   1,000 mcg at 08/09/24 0906    glucose chewable tablet 16-32 g of dextrose  16-32 g of dextrose oral PRN Joel Rodriguez MD        Or    dextrose 40 % oral gel 15-30 g of dextrose  15-30 g of dextrose oral PRN Joel Rodriguez MD        Or    glucagon (GLUCAGEN) injection 1 mg  1 mg intramuscular PRN Joel Rodriguez MD        Or    dextrose 50 % in water (D50) injection 12.5 g  25 mL intravenous PRN Joel Rodriguez MD        docusate sodium (COLACE) capsule 100 mg  100 " mg oral Daily PRN Joel Rodriguez MD        honey (MEDIHONEY) 100 % topical paste   Topical Daily PRN Nelson Pratt DPM        levothyroxine (SYNTHROID) tablet 100 mcg  100 mcg oral Daily Joel Rodriguez MD   100 mcg at 08/09/24 0905    lidocaine (ASPERCREME) 4 % topical patch 1 patch  1 patch Topical Daily Joel Rodriguez MD   1 patch at 08/09/24 0906    lidocaine (ASPERCREME) 4 % topical patch 1 patch  1 patch Topical Daily Joel Rodriguez MD   1 patch at 08/09/24 0906    pantoprazole (PROTONIX) tablet,delayed release (DR/EC) 20 mg  20 mg oral Daily Joel Rodriguez MD   20 mg at 08/09/24 0905    sildenafiL (pulm.hypertension) (REVATIO) tablet 20 mg  20 mg oral TID Joel Rodriguez MD   20 mg at 08/09/24 0905    sodium chloride (OCEAN) 0.65 % nasal spray 2 spray  2 spray Each Nostril Daily Joel Rodriguez MD   2 spray at 08/09/24 1015    white petrolatum (AQUAPHOR) ointment   Topical q4h PRN Molly Hidalgo CRNP   Given at 08/09/24 1224       Review of Systems:   All other systems reviewed and are negative except as per HPI.    Physical Exam:     Wt Readings from Last 10 Encounters:   08/08/24 50.8 kg (112 lb)   08/05/24 49.4 kg (109 lb)   08/05/24 49.4 kg (109 lb)   07/20/24 49.5 kg (109 lb 2 oz)   07/19/24 51.3 kg (113 lb)   06/20/24 51.7 kg (114 lb)   06/14/24 52.6 kg (116 lb)   05/23/24 52.4 kg (115 lb 9.6 oz)   04/17/24 53.5 kg (118 lb)   04/10/24 44.5 kg (98 lb)       BP Readings from Last 5 Encounters:   08/09/24 (!) 147/88   08/05/24 (!) 148/75   07/25/24 (!) 155/77   07/23/24 (!) 101/57   07/19/24 (!) 154/80       Vitals:    08/09/24 1245   BP: (!) 147/88   Pulse: 81   Resp: 18   Temp: 36.4 °C (97.5 °F)   SpO2: 96%       Constitutional: NAD, AAOx3  HENT: Normocephalic, atraumatic head, sclerae anicteric, no cervical lymphadenopathy, trachea midline, facial swelling  Cardiovascular: RRR, no murmurs, rubs or gallops, JVP: 12 cm H2O   cm H2O, no carotid bruits.  Respiratory: CTA bilateral lung  fields, no wheezes, rales or rhonchi  GI: soft, non-tender/non-distended  Musculoskeletal / Extremities: trace peripheral edema, +2 pulses bilateral radial, DP/PT arteries, skin tightening on face and fingertips, ulceration and discoloration of 2nd phalange b/l LE.   Skin: no rashes  Neuro / Psych: no focal deficits, CNII-XII grossly intact, appropriate, cooperative    Diagnostic Data:     Component      Latest Ref Rng 7/20/2024 7/25/2024   Sodium      136 - 145 mEQ/L 142  139    Potassium, Bld      3.5 - 5.1 mEQ/L 4.2  4.3    Chloride      98 - 107 mEQ/L 107  103    CO2      21 - 31 mEQ/L 25  27    BUN      7 - 25 mg/dL 17  17    Creatinine      0.6 - 1.2 mg/dL 1.2  1.1    Glucose      70 - 99 mg/dL 85  86    Calcium      8.6 - 10.3 mg/dL 9.3  10.6 (H)    AST (SGOT)      13 - 39 IU/L  15    ALT (SGPT)      7 - 52 IU/L  8    Alkaline Phosphatase      34 - 125 IU/L  82    Total Protein      6.0 - 8.2 g/dL  8.8 (H)    Albumin      3.5 - 5.7 g/dL  4.3    Bilirubin, Total      0.3 - 1.2 mg/dL  0.4    eGFR      >=60.0 mL/min/1.73m*2 51.0 (L)  56.6 (L)    Anion Gap      3 - 15 mEQ/L 10  9       Component      Latest Ref Rng 7/20/2024 7/25/2024   WBC      3.80 - 10.50 K/uL 7.27  10.29    Hemoglobin      11.8 - 15.7 g/dL 9.8 (L)  11.2 (L)    Hematocrit      35.0 - 45.0 % 32.0 (L)  37.3    MCV      83.0 - 98.0 fL 80.4 (L)  80.4 (L)    Platelets      150 - 369 K/uL 206  331       Component      Latest Ref Rng 7/19/2024 7/25/2024 8/9/2024   High Sens Troponin I      <15.0 pg/mL 89.2 (HH)  66.8 (HH)  92.5 (HH)    High Sens Troponin I       90.3 (HH)  83.4 (HH)         Component      Latest Ref Rng 5/22/2024 6/22/2024 7/19/2024 7/25/2024   BNP      <=100 pg/mL 352 (H)  150 (H)  151 (H)  220 (H)       Component      Latest Ref Rng 4/16/2024 5/23/2024 7/21/2024 7/22/2024   Sed Rate      0 - 30 mm/hr 101 (H)  79 (H)  105 (H)  >119 (H)       Component      Latest Ref Rng 4/18/2024 5/24/2024   WBC      3.80 - 10.50 K/uL 8.40  16.22  (H)    RBC      3.93 - 5.22 M/uL 3.54 (L)  3.82 (L)    Hemoglobin      11.8 - 15.7 g/dL 9.8 (L)  10.3 (L)    Hematocrit      35.0 - 45.0 % 32.8 (L)  33.9 (L)    Platelets      150 - 369 K/uL 254  289       Component      Latest Ref [facility] 5/24/2024   Magnesium      1.8 - 2.5 mg/dL 2.1      Component      Latest Ref [facility] 4/13/2023 3/7/2024 5/22/2024   TSH      0.34 - 5.60 mIU/L 4.71  0.28 (L)  1.03      Component      Latest Ref [facility] 5/24/2024   Sodium      136 - 145 mEQ/L 138    Potassium, Bld      3.5 - 5.1 mEQ/L 4.4    Chloride      98 - 107 mEQ/L 104    CO2      21 - 31 mEQ/L 24    BUN      7 - 25 mg/dL 40 (H)    Creatinine      0.6 - 1.2 mg/dL 1.1    Glucose      70 - 99 mg/dL 87    Calcium      8.6 - 10.3 mg/dL 9.5    eGFR      >=60.0 mL/min/1.73m*2 56.6 (L)    Anion Gap      3 - 15 mEQ/L 10      Component      Latest Ref [facility] 4/13/2023   Hemoglobin A1C      <5.7 % 4.4        Component      Latest Ref [facility] 4/14/2023   Triglycerides      30 - 149 mg/dL 44    Cholesterol      <=200 mg/dL 194    HDL      >=55 mg/dL 49 (L)    LDL Calculated      <=100 mg/dL 136 (H)    Non-HDL, Calculated      mg/dL 145    RISK      <=5.0  4.0       Component      Latest Ref [facility] 4/13/2023   High Sens Troponin I      <15.0 pg/mL 23.0 (H)       Component      Latest Ref [facility] 4/14/2023   Ferritin      11 - 250 ng/mL 75      Component      Latest Ref [facility] 4/14/2023   Iron      35 - 150 ug/dL 29 (L)    TIBC      270 - 460 ug/dL 245 (L)    UIBC      180 - 360 ug/dL 216    Iron Saturation      15 - 45 % 12 (L)        Component      Latest Ref [facility] 6/27/2022 4/13/2023   BNP      <=100 pg/mL 111 (H)  105 (H)                          ---    ECG (8/2/2024, personally reviewed):   Sinus tachycardia   Left ventricular hypertrophy with repolarization abnormality    HR: 117 bpm     ---        CT Chest / Abdomen / Pelvis w/ Contrast (7/25/2024, personally reviewed):     Lung bases: Left-sided pleural effusion, left parenchymal consolidation, and  findings of  interstitial lung disease.  More nodular density in the medial  aspect of the left lower lobe measuring 1.6 x 1.9 cm.  Cardiomegaly with a  pericardial effusion.     Liver: The liver is normal in size.  There is no focal mass.  Hepatic veins and  portal veins are patent without focal filling defects to suggest thrombosis..     Gallbladder:  Small dependent gallstones.  No gallbladder wall thickening..     Spleen: Spleen is normal in size without focal mass lesion..     Pancreas: The pancreas is normal without focal mass, parenchymal atrophy, or  pancreatic duct dilation..     Adrenals: The adrenals are normal bilaterally without focal mass..     Kidneys, ureters and bladder:  Symmetric enhancement and excretion..  Left lower  pole cystic lesion measures 3.2 cm in maximal dimension.  This measures greater  than simple fluid density.  This measured 60 Hounsfield units on the noncontrast  CT from 2020.  It measures 36 Hounsfield units on today's study.  Therefore it  is most in keeping with a hemorrhagic/proteinaceous cyst.  Additional  hypodensities too small to characterize.     Retroperitoneal structures: There is no abdominal aortic aneurysm.  Atherosclerotic calcification.  There is no retroperitoneal adenopathy or  retroperitoneal mass..     Bowel and mesentery: No evidence of a focal inflammatory or obstructive process.  Moderate fecal retention throughout the colon.  There are a few fluid-filled  small bowel loops in the pelvis, nonspecific.     Lymph nodes: No pathologic lymphadenopathy..     Pelvis: The uterus is normal in size.  The ovaries are normal.  There is no  adnexal mass or pelvic free fluid.     Bones: Within normal limits.    No evidence for pulmonary embolism.  Persistent left-sided pleural effusion and airspace disease at the left lung  base.  Underlying interstitial lung disease.     TTE (7/19/2024, personally reviewed):   Mild increased wall thickness with normal left ventricular cavity and  preserved systolic function. EF 65%. No wall motion abnormalities.   Normal right ventricular size with preserved systolic function.   IVC normal in size with >50% respiratory variation consistent with normal right sided filling pressures.   Small pericardial effusion. No evidence of hemodynamic compromise with normal respiratory variation and no chamber collapse. Prominent epicardial fat.   Compared with previous study of 5/23/2024, small pericardial effusion persists.      PET/CT Skull-to-Thigh (6/4/2024):   An approximately 1.6 cm pulmonary nodule at the medial left lung base is  significantly FDG avid at max SUV 8.5.  This is nonspecific and can be seen in  the setting of benign pathology however malignancy is a concern.     There is more mild to moderate nonspecific uptake localizing to lita-fissural  nodules particularly on the left.     There is  moderate nonspecific left hilar and mediastinal dawson uptake.     No additional suspicious FDG avid findings appreciated.     CTA Chest (5/21/2024):   IMPRESSION:  1.  No evidence of acute pulmonary embolism.  2.  Multiple new perifissural left lower lobe nodules, suspicious for a  neoplastic process. PET/CT and or tissue sampling is recommended.  3.  Chronic interstitial lung disease in an NSIP pattern, with overall slightly  progressed appearance since March 2023. Pulmonary consultation is recommended.  4.  Stable cardiomegaly and large pericardial effusion.  5.  Mediastinal and supraclavicular adenopathy, similar to prior study, also  indeterminate, which could be further evaluated at time of PET/CT.     CTA Chest (4/16/2024):   IMPRESSION:  1. No evidence of pulmonary embolism.  2. Stable cardiomegaly and large pericardial effusion. Cardiology consultation  suggested.  3. Emphysema with bilateral lower lobe groundglass opacity and bronchiectasis in  an NSIP pattern, which can be seen with scleroderma. Pulmonary consultation  suggested.  4. Stable 16 mm left lower  lobe masslike density which may represent  atelectasis, lung cancer or lymphoma. When clinically appropriate PET/CT  suggested.  5. Stable prominence of the main pulmonary artery which can be seen with  pulmonary hypertension     TTE (5/23/2024, personally reviewed):  Mild increased wall thickness with normal left ventricular cavity and preserved systolic function. EF 65%. No wall motion abnormalities.   Normal right ventricular size with preserved systolic function.   Tiny IVC with >50% respiratory variation consistent with low-normal right sided filling pressures.   Small pericardial effusion, predominantly adjacent to right atrium. No evidence of hemodynamic compromise with normal respiratory variation and no chamber collapse. Prominent epicardial fat.   Compared with previous study of 4/10/24, small pericardial effusion persists.      TTE (4/10/2024, personally reviewed):  1. Normal left ventricular cavity size with mild concentric left ventricular hypertrophy. Normal systolic function. EF: 60%. No regional wall motion abnormalities. Cannot determine diastolic function. Global longitudinal strain not reported due to technical limitations of study.   2. Aortic valve has three cusps. Trace aortic regurgitation. Aortic valve and root sclerosis.  3. Thickened mitral valve leaflets. Trace mitral valve regurgitation. Normal left atrial size.   4. Normal right ventricular structure and function. Trace tricuspid valve regurgitation with estimated RVSP: 51 mmHg (assuming right atrial pressure of 3 mmHg). Normal right atrial size. Pulmonic valve structurally normal. Trace pulmonic valve regurgitation. Pulsed wave Doppler of the RVOT systolic profile shows decreased acceleration times and geometry consistent with mildly elevated PVR.  5. IVC size is normal with > 50% inspiratory collapse consistent with normal right atrial pressure. Small-to-moderate sized, circumferential pericardial effusion. No echocardiographic  evidence for cardiac tamponade.   6. Compared to previous study from 3/4/2024, no significant change. Pericardial effusion size is similar, estimated right atrial pressure lower.           CTA Abd / Pelvis (3/13/2024, personally reviewed):   There is dilatation of the of proximal/mid small bowel with relatively abrupt  transition left hemiabdomen, likely obstruction. Questionable lesion at the  transition point. This could be better evaluated with CT or MRI enterography.     Aorta and major branches patent. Superior mesenteric artery patent without  significant stenosis.  Suspect moderate stenosis of the origins of the of renal arteries bilaterally.     Large pericardial effusion, no change.     Patchy opacities lower lungs, similar to prior.      Coronary CTA (3/11/2024):   1. Two-vessel coronary artery disease as above that is moderate in severity.  CT  FFR is normal..  2. There is mitral annular calcification and aortic sclerosis.  The right atrium  is enlarged.  There is left ventricular hypertrophy.  There is a moderate to  large circumferential pericardial effusion.  3. Normal left ventricular wall motion and systolic function.  4. Coronary calcium score 313, 96th percentile     TTE (3/4/2024, personally reviewed):   Normal-sized left ventricle. Mild left ventricular hypertrophy. Preserved systolic function. LVEF 60%. No regional wall motion abnormalities. Grade II diastolic dysfunction.  Normal global longitudinal strain (-20%).  The aortic valve is not well seen.  Cannot determine the number of cusps.  Sclerotic leaflets.  No aortic stenosis.  Trace aortic insufficiency.  Normal sized aortic root.  The visualized portion of the ascending aorta is normal in size.  The mitral valve leaflets are thickened. Mild mitral annular calcification. Trace mitral regurgitation.   Mildly dilated left atrium.  Normal-sized right ventricle. Normal right ventricular systolic function.  Thickened tricuspid valve. There is  mild tricuspid regurgitation with estimated RVSP of 46 mmHg.   Pulmonic valve is not well visualized. Trace pulmonic regurgitation.   Mildly dilated right atrium.  IVC is enlarged with <50% respiratory variation consistent with elevated right atrial filling pressure (at least 15 mmHg).   Moderate, circumferential, circumferential pericardial effusion.  The largest pocket is located inferolaterally.  Elevated right atrial pressure.  No other clear echocardiographic features of increased pericardial pressure.  Correlate clinically.   Compared to previous TTE from 6/21/2023, pericardial effusion now moderate in size.  Right atrial pressure now elevated.         CTA Chest (3/3/2024):   IMPRESSION:  1.  No evidence of acute pulmonary embolism to the level of the segmental  branches.  2.  Moderate to large pericardial effusion measuring higher than simple fluid  density.  3.  Lower lobe lung field predominant interstitial thickening with some relative  subpleural sparing, consistent with a chronic interstitial lung disease,  unchanged from the prior study.  4.  Continued bilateral axillary, mediastinal, and bilateral hilar  lymphadenopathy, not definitely changed from the prior study, possibly related  to the patient's sarcoid.  5.  Emphysema.      CTA Chest PE (10/4/2023):  IMPRESSION:  1. No evidence for filling defect or vessel cutoff to suggest pulmonary  embolism.  Enlargement of the pulmonary arteries compatible with pulmonary  arterial hypertension.  2. Changes throughout the lungs as described above which can be seen with  systemic sclerosis/scleroderma.  Underlying pneumonia the lung bases cannot be  entirely excluded in the proper clinical setting.  3.  Additional stable findings as described above.        VQ (9/12/2023):  IMPRESSION:  Intermediate to high probability of pulmonary embolic disease.     6MWT (7/25/2023, on Sildenafil 20 mg TID):        TTE (6/21/2023, personally reviewed):  1. Normal left  ventricular cavity size and mild concentric left ventricular hypertrophy. Normal systolic function. EF: 60%. No regional wall motion abnormalities. Grade I diastolic dysfunction.   2. Aortic valve cusps not well visualized. Trace aortic regurgitation. Aortic valve and root sclerosis.  3. Thickened mitral valve leaflets. Mild mitral annular calcification. Trace mitral regurgitation. Mildly dilated left atrial size.   4. Normal right ventricular structure and function. Mild tricuspid valve regurgitation with estimated RVSP: 55 mmHg (assuming right atrial pressure of 3 mmHg). Normal right atrial size. Pulmonic valve not well visualized. Trace pulmonic valve regurgitation. Pulsed wave Doppler of the RVOT systolic profile show decreased acceleration times ( msec) and late systolic notching consistent with elevated PVR.   5. IVC size is normal with > 50% inspiratory collapse consistent with normal right atrial pressure. Small pericardial effusion without echocardiographic evidence of tamponade.  6. Compared to previous study from 4/14/2023, estimated right ventricular systolic pressure is higher.        TTE (4/14/2023, personally reviewed):   The left ventricular cavity size is normal with mild left ventricular hypertrophy and preserved systolic function. Estimated EF 65%. No regional wall motion abnormalities. Grade I diastolic dysfunction.     The aortic valve is tricuspid. Aortic valve sclerosis. Trace aortic regurgitation.      The visualized portions of the aortic root and ascending aorta are normal in size.     The mitral valve is mildly thickened. Mild mitral annular calcification. Trace mitral regurgitation.       The left atrium is severely dilated.      The right ventricle is normal in size with preserved systolic function     The pulmonic valve is not well seen.     The tricuspid valve is normal in appearance. mild tricuspid regurgitation with an estimated RVSP of 34 mmHg.       Right atrium is normal in  size.     The IVC is small and collapses > 50% with inspiration consistent with normal right atrial pressures.     Small pericardial effusion, mostly posterior.       Compared to previous echocardiogram from 8/22/2022, there is no significant change        TTE (11/28/2022, personally reviewed):   Normal right- and mildly elevated left-sided filling pressures (RA: 3 mmHg, PCWP: 13 mmHg)  Elevated pulmonary artery pressures (60/22(35 mmHg)) in the setting of PCWP: 13 mmHg.   Normal cardiac output and index (CO: 5.1 L/min, CI: 3.2 L/min/m2)  PVR: 4.3 Wood units. SVR: 1840 dynes/sec/cm5      TTE (8/22/2022, personally reviewed):  Normal-sized LV. Normal LV systolic function. Estimated EF 55- 60%. Wall motion appears grossly normal. Mild concentric left ventricular hypertrophy. Grade I LV diastolic dysfunction.  Pulmonic valve not well visualized. Grossly normal pulmonic valve structure. Trace pulmonic valve regurgitation. No pulmonic valve stenosis.  Aortic valve not well visualized. Aortic valve not well seen but likely trileaflet. Focal aortic valve calcification present. Sclerotic aortic valve leaflets. Mild aortic valve regurgitation. No aortic valve stenosis.  RV not well visualized. Normal-sized RV. Normal RV systolic function.  Normal-sized RA.  There is a small loculated pericardial effusion anterior to the right atrium. No cardiac tamponade.  Sclerotic mitral valve. Mitral valve calcification of the anterior leaflet present.  Trace mitral valve regurgitation.  No significant mitral valve stenosis.  Normal-sized IVC. IVC demonstrates normal respiratory collapse.  Tricuspid valve structure is grossly normal. Mild to moderate tricuspid valve regurgitation.  Estimated RVSP = 50-55 mmHg.  Mildly dilated LA.  No previous echocardiogram available for comparison.     TTE (4/21/2022, outside study):  This result has an attachment that is not available.     A complete transthoracic echocardiogram (including 2D, color  flow   Doppler, spectral Doppler and M-mode imaging) was performed using the   standard protocol. The study quality was adequate.     The left ventricle is normal in size. There is moderately increased   wall thickness consistent with moderate concentric hypertrophy.   Endocardium is incompletely visualized, but no regional wall motion   abnormalities are noted in the visualized segments. Normal left   ventricular ejection fraction. The left ventricular ejection fraction by   visual estimate is 55% to 60%.     The right ventricle is normal in size. There is normal function of the   right ventricle.     The left atrium is normal in size. Left atrial volume is 58 mL.     The right atrial volume measures 52 mL. The right atrial volume index   measures 34 mL/m2.     There is trace mitral regurgitation. There is no mitral stenosis.     The aortic valve is trileaflet. There is mild aortic valve thickening.   There is no aortic stenosis. There is trace aortic regurgitation.     There is mild to moderate tricuspid regurgitation. Pulmonary artery   systolic pressure measures 52 mmHg. The pulmonary artery systolic pressure   is moderately elevated.     The aorta measures 3.2 cm at the sinus of Valsalva. The proximal   segment of the ascending aorta is mildly enlarged. The proximal segment of   the ascending aorta measures 3.4 cm. The proximal segment of the ascending   aorta measures 2.2 cm/m2, indexed for body surface area.     Trivial pericardial effusion present. There is no echocardiographic   evidence of cardiac tamponade.     The inferior vena cava is normal in size (diameter <21 mm) and   decreases >50% in size with inspiration, suggesting a normal right atrial   pressure of 3 mmHg (range 0-5 mm Hg).     Compared to the prior study of 3/13/2020, there are no significant   changes.        PFT (3/23/2022):      PFT (7/14/2021):      CT Chest (5/2/2021, outside study):  CLINICAL INFORMATION: Systemic sclerosis.  Interstitial lung disease. Groundglass nodule     PROCEDURE: Unenhanced CT of the chest was performed using thin section reconstructions. Images were obtained on inspiration and expiration.     COMPARISON: June and August 2020     FINDINGS:     LUNGS, PLEURA:     Groundglass opacities with reticulation with subpleural sparing in the lower lobes, without honeycombing or architectural distortion, unchanged.     Right upper lobe groundglass nodule, image 114 of series 3, 8 x 11 mm, previously 6 mm.     Expiratory images are of diagnostic quality and do not demonstrate evidence of clinically significant air trapping.     CARDIOVASCULAR, MEDIASTINUM, THYROID: Coronary calcifications. Trace pericardial effusion.     LYMPH NODES: Subcentimeter mediastinal lymph nodes, unchanged. Unchanged axillary lymph nodes.     SKELETON, CHEST WALL: No destructive bone lesion     UPPER ABDOMEN: Patulous esophagus. 3 mm right renal stone.       RHC (9/02/2020):  RA   8 mmHg   RV   40/5 mmHg   PA (mean)  44/16 (25) mmHg   PCWP   12 mmHg   CO   4.65 lpm (Thermodilution)   CI   2.65 lpm/m-squared   SVI    33 ml/beat/m-squared (lower limit normal 29)   PVR   2.8 mmHg/l/min (Wood units)   SVR   2064 dyne-sec-cm-5         PFT (3/18/2020):      TTE (3/13/2020, outside study):  · The study quality was good.   · The left ventricle is normal in size. Top normal left ventricular wall   thickness. There are no segmental wall motion abnormalities. The left   ventricular ejection fraction is 55-60% by visual estimate and 3D   echocardiography. Normal left ventricular ejection fraction.   · There is grade I (mild) LV diastolic dysfunction.   · The right ventricle is normal in size. There is normal function of the   right ventricle.   · The right atrium is mildly dilated.   · There is mild mitral valve anterior leaflet thickening. There is mild   mitral valve leaflet calcification. There is flattening of the mitral   leaflets without raphael prolapse.  There is trace mitral regurgitation.   · The aortic valve is trileaflet. There is mild thickening of the aortic   valve. There is mild calcification of the aortic valve. There is no aortic   /stenosis. There is trace aortic regurgitation.   · There is mild tricuspid valve leaflet thickening. There is mild to   moderate tricuspid regurgitation. The pulmonary artery systolic pressure   is moderately elevated. Pulmonary artery systolic pressure measures 50   mmHg. There is mid-systolic notching of the RVOT VTI consistent with   elevated pulmonary vascular resistance.   · The inferior vena cava is normal in size (diameter <21 mm) and decreases   >50% in size with inspiration, suggesting a normal right atrial pressure   of 3 mmHg (range 0-5 mm Hg).   · Trivial loculated pericardial effusion present adjacent to the right   ventricle and adjacent to the right atrium.          Assessment / Plan:     1. Chest Pain:  - Given pattern and character, I believe this is musculoskeletal / serous pain from her autoimmune disease. There may be an element of myopericarditis.  - She reports that pain has not responded to increased in steroids in the past   - Non-ACS chest pain and known non-obstructive CAD as recently as March 2024 CCTA.   - Discussed with primary team - Pain Management consultation.    2. Pulmonary Hypertension (WHO Group I, NYHA/WHO FC III):   - Occurring in the background of Systemic Scleroderma with ILD. August 2022 TTE showed normal RV size and function, but progressive exertional decline, worsened DLCO and resting tachycardia suggest there may be progression of her pulmonary vascular disease and limitation of cardiac output. This was proven with 11/2022 RHC showing mean PA 35 mmHg (with PCWP 13 mmHg) and PVR 4.3 Wood units. Recent TTEs have shown appropriate RV:PA coupling with normal RV size and systolic function.   - She is hypervolemic on exam  - CXR improved today  - Would give Ethacrynic Acid IV 50 mg BID  today and increase home dose to 50 mg daily  - Continue Sildenafil 20 mg TID   - She should continue uninterrupted Macitentan 10 mg daily - I have asked her to get family to bring this medication in.     3. HTN:   - Continue Amlodipine.     4. Systemic Scleroderma / Raynaud's Disease:   - Per Rheumatology (Dr. Trevizo).      5. ILD:   - Per Pulmonology and Rheumatology (Dr. Trevizo). She has not tolerated trials of Mycophenolate. She briefly received Tocilizumab.     6. PE:   - She has had elevated probability on V/Q scans, but CTA Chest has consistently not shown evidence for acute or chronic thromboembolism. Apixaban has been discontinued.     7. CAD:   - Two-vessel non-obstructive disease on March 2024 CCTA. She is continued on Atorvastatin 40 mg daily.     8. Toe Ulcerations  - Per Rheumatology, unlikely to see LE ulcerations with scleroderma  - Would check ENRRIQUE/PVR b/l LE    9. Lung Mass  - Pathology from 8/8 biopsy with atypical glandular cells, possibly consistent with adenocarcinoma  - Management per pulmonology    Timothy Arvizu, DO

## 2024-08-09 NOTE — NURSING NOTE
Patient tolerated po juice and peanut butter crackers. Patient turned to check for skin breakdown, sheets changed. Pt. With dry skin on sacrum, no mepilex applied. Patient oxygenation 91-92% on 3 LNC, patient reports wearing 2 L NC of oxygen at home. VSS.

## 2024-08-09 NOTE — ASSESSMENT & PLAN NOTE
DPLD  Does not appear to be in exacerbation for this  Has not tolerated trials of Mycophenolate. On Tocilizumab.   On 2L O2-NC chronically

## 2024-08-09 NOTE — PLAN OF CARE
Care Coordination Admission Assessment Note    General Information:  Readmission Within the last 30 days: previous discharge plan unsuccessful  Does patient have a : No  Patient-Specific Goals (include timeframe): to go home    Living Arrangements:  Arrived From: home  Current Living Arrangements: home  People in Home: alone  Home Accessibility:    Living Arrangement Comments:      Housing Stability and Utility Access (SDOH):  In the last 12 months, was there a time when you were not able to pay the mortgage or rent on time?: No  In the last 12 months, how many places have you lived?:    In the last 12 months, was there a time when you did not have a steady place to sleep or slept in a shelter (including now)?: No  In the past 12 months has the electric, gas, oil, or water company threatened to shut off services in your home?: No    Functional Status Prior to Admission:   Assistive Device/Animal Currently Used at Home: walker, standard, wheelchair, oxygen, stair glide  Functional Status Comments:    IADL Comments:       Supports and Services:  Current Outpatient/Agency/Support Group:    Type of Current Home Care Services: home health aide, nursing  History of home care episode or rehab stay:      Discharge Needs Assessment:   Concerns to be Addressed: discharge planning  Current Discharge Risk: chronically ill, lives alone  Anticipated Changes Related to Illness: inability to care for self    Patient/Family Anticipated Discharge Plan:  Patient/Family Anticipates Transition To: home, home with family, home with help/services  Patient/Family Anticipated Services at Transition: home health care    Connection to Community       Patient Choice:   Offered/Gave Vendor List:    Patient's Choice of Community Agency(s):         Anticipated Discharge Plan:  Met with patient. Provided education and contact information for Care Coordination services.: yes  Anticipated Discharge Disposition: home with home  health     Transportation Needs (SDOH):  Transportation Concerns: none  Transportation Anticipated: family or friend will provide  Is Out of Hospital DNR needed at discharge?: no    In the past 12 months, has lack of transportation kept you from medical appointments or from getting medications?: No  In the past 12 months, has lack of transportation kept you from meetings, work, or from getting things needed for daily living?: No    Concerns - comments: assessment completed with pt address/pcp/insurance/pharm verified.  pt has home O2, pt is unsure of company name (as per Chart review - [facility]) pt confirmed O2 equipment is in working condition. pt does not have portable tank at bedside,  pt will try to arrange transport at dc -ncluding  family will come in to get house key to retreive portable O2 at home and then come back to transport pt. pt will reach out if assistance needed with transport at [place]. pt is current with [facility] and would like to resume their service at [place].

## 2024-08-09 NOTE — ASSESSMENT & PLAN NOTE
Chronic, extensive history    Continue home med macitentan  On sildenafil  Ethacrynic acid 50mg resumed 8/17

## 2024-08-09 NOTE — CONSULTS
Podiatric Surgery Consult Note    Subjective      Arielle Felix is a 63 y.o. female who was admitted for Pulmonary edema, acute (CMS/HCC)    Lung nodule    Thoracic lymphadenopathy    Acute chest pain    Coronary artery disease involving native coronary artery of native heart without angina pectoris. Patient was consulted for left second toe necrosis.  Patient states that she has had a history of lower extremity wounds due to her circulation and Raynaud's disease.  Patient notes that she has had the wounds to her ankles, right foot, and the left big toe for several months now.  However, patient notes that within the last week she noticed the tip of her left second toe turning dark in appearance.  She also notes pain to this toe while resting and while walking.  Patient denies any other pedal complaints and denies any N/V/F/C/CP/SOB.      Review of Systems:   Constitutional:  Negative for fevers, chills   Cardiovascular: Negative for chest pain, SOB   Gastrointestinal: Negative for nausea and vomiting.   Musculoskeletal: Negative for tenderness in any ghulam prominences, joints, tendons, myalgias      Medical History:   Past Medical History:   Diagnosis Date    At risk for falls     De Quervain's tenosynovitis     Essential (primary) hypertension     Follicular carcinoma of thyroid (CMS/HCC)     Gastro-esophageal reflux     Hand ulceration (CMS/HCC)     Hyperlipidemia, unspecified     Interstitial lung disease (CMS/HCC)     Leg ulcer (CMS/HCC)     Lung nodule     Lupus (CMS/HCC)     O2 dependent     On 2L    Osteomyelitis of hand (CMS/HCC)     Osteoporosis     Pulmonary hypertension (CMS/HCC)     PVD (peripheral vascular disease) (CMS/HCC)     Raynaud's disease     Severe    Reflux esophagitis     Scleroderma (CMS/HCC)     Screening mammogram for breast cancer 05/04/2021    BI-RADS category: 1 - Negative (care everywhere @ [place])    Vertigo     Wound, open, foot     bilateral,dressing change with medihoney and  "mupirocin ointment by homecare nurse       Surgical History:   Past Surgical History:   Procedure Laterality Date    CARDIAC CATHETERIZATION      COLONOSCOPY      HERNIA REPAIR      THYROIDECTOMY         Social History:   Social History     Socioeconomic History    Marital status:      Spouse name: None    Number of children: None    Years of education: None    Highest education level: None   Tobacco Use    Smoking status: Former     Types: Cigarettes     Quit date: 9/15/2005     Years since quittin.9    Smokeless tobacco: Never    Tobacco comments:     Would smoke 1/2 of 1/2 PPD, quit in    Vaping Use    Vaping Use: Never used   Substance and Sexual Activity    Alcohol use: Never    Drug use: Never    Sexual activity: Not Currently     Birth control/protection: Post-menopausal     Social Determinants of Health     Food Insecurity: No Food Insecurity (2024)    Hunger Vital Sign     Worried About Running Out of Food in the Last Year: Never true     Ran Out of Food in the Last Year: Never true   Transportation Needs: No Transportation Needs (2024)    PRAPARE - Transportation     Lack of Transportation (Medical): No     Lack of Transportation (Non-Medical): No   Housing Stability: Low Risk  (2024)    Housing Stability Vital Sign     Unable to Pay for Housing in the Last Year: No     Number of Places Lived in the Last Year: 1     Unstable Housing in the Last Year: No       Family History:   Family History   Problem Relation Age of Onset    Heart disease Biological Brother         stent    Breast cancer Niece     Cervical cancer Neg Hx     Uterine cancer Neg Hx     Colon cancer Neg Hx     Ovarian cancer Neg Hx        Allergies:   Allergies   Allergen Reactions    Aspirin Shortness of breath     Other reaction(s): Unknown  \"stop breathing\"      Ibuprofen Shortness of breath     Stop breathing    Iodine Shortness of breath     Other reaction(s): Unknown    Iodine And Iodide Containing Products " Shortness of breath     ? rash    Morphine Shortness of breath      Reported wamrth of face, improved with benadryl and cold compresses after getting morphine as well as episode of (subjective ) dyspnea, and thought she passed out - did have wamrth and erythema of face with mild edema R>L cheek notably on exam.     Penicillins Shortness of breath     Other reaction(s): Unknown    Sulfa (Sulfonamide Antibiotics) Angioedema    Clindamycin     Hydrochlorothiazide      Other reaction(s): Abdominal Pain   Slow heart rate    Oxycodone      Other reaction(s): Vomiting    Sulfamethoxazole-Trimethoprim      Other reaction(s): Vomiting, Weakness     Latex Rash       Home Medications:    amLODIPine, Take 10 mg by mouth every morning.    atorvastatin, Take 1 tablet (40 mg total) by mouth daily.    benzonatate, Take 100 mg by mouth 3 (three) times a day as needed for cough.    biotin, Take 1,000 mcg by mouth daily.    cholecalciferol (vitamin D3), Take 1,000 Units by mouth daily.    collagenase, Apply 1 Application topically daily.    cyanocobalamin (vitamin B-12), Take 1,000 mcg by mouth daily.    levothyroxine, Take 100 mcg by mouth daily. BRAND ONLY    MEDIHONEY, HONEY, TOP, Apply topically daily.    mupirocin, Apply 1 application. topically daily. Behind ears    OPSUMIT, Take 1 tablet (10 mg total) by mouth daily.    sildenafiL (pulm.hypertension), Take 1 tablet (20 mg total) by mouth 3 (three) times a day.    docusate sodium, Take 100 mg by mouth daily as needed for constipation.    hydrocortisone, Apply to affected area 2 times daily    lidocaine, Apply 1 patch topically daily as needed (pain). Remove & discard patch within 12 hours or as directed by prescriber.    loperamide, Take 2 mg by mouth every 8 (eight) hours as needed for diarrhea.    meclizine, Take 25 mg by mouth daily as needed.    pantoprazole, Take 20 mg by mouth daily.    sodium chloride, Administer 2 sprays into each nostril daily.    ACTEMRA, Infuse 8  mg/kg into a venous catheter every 28 (twentyeight) days.    Current Medications:    acetaminophen, 975 mg, oral, q6h SPENCER    amLODIPine, 10 mg, oral, q AM    atorvastatin, 40 mg, oral, Daily (6p)    benzonatate, 100 mg, oral, 3x daily PRN    cyanocobalamin, 1,000 mcg, oral, Daily    glucose, 16-32 g of dextrose, oral, PRN **OR** dextrose, 15-30 g of dextrose, oral, PRN **OR** glucagon, 1 mg, intramuscular, PRN **OR** dextrose 50 % in water (D50), 25 mL, intravenous, PRN    docusate sodium, 100 mg, oral, Daily PRN    ethacrynic acid (EDECRIN) 50 mg in sodium chloride 0.9 % 50 mL IVPB, 50 mg, intravenous, Once    honey, , Topical, Daily PRN    levothyroxine, 100 mcg, oral, Daily    lidocaine, 1 patch, Topical, Daily    lidocaine, 1 patch, Topical, Daily    pantoprazole, 20 mg, oral, Daily    sildenafiL (pulm.hypertension), 20 mg, oral, TID    sodium chloride, 2 spray, Each Nostril, Daily    white petrolatum, , Topical, q4h PRN    ethacrynic acid    Last documented Vital Signs:  Vitals    08/09/24 0243 08/09/24 0416 08/09/24 0845 08/09/24 1245   BP: 129/62  139/63 (!) 147/88   Pulse: 62 63 69 81   Resp: 16  18 18   Temp: 36.8 °C (98.2 °F)  36.8 °C (98.2 °F) 36.4 °C (97.5 °F)   SpO2: 99%  96% 96%       Labs:  CBC Results         07/25/24 07/20/24 07/19/24     1420 0414 1432    WBC 10.29 7.27 9.78    RBC 4.64 3.98 4.46    HGB 11.2 9.8 10.9    HCT 37.3 32.0 35.9    MCV 80.4 80.4 80.5    MCH 24.1 24.6 24.4    MCHC 30.0 30.6 30.4     206 212          Microbiology Results       Procedure Component Value Units Date/Time    Fungal Stain Bronchioalveolar Lavage, Left Lower Lobe [[id number]] Collected: 08/08/24 1315    Specimen: Bronch Lavage from Bronchioalveolar Lavage, Left Lower Lobe Updated: 08/09/24 1311     Fungus Stain No fungal elements seen    Sputum Gram Stain (Lab Only) Bronchioalveolar Lavage, Left Lower Lobe [[id number]] Collected: 08/08/24 1315    Specimen: Bronch Lavage from Bronchioalveolar Lavage, Left  Lower Lobe Updated: 08/08/24 1635     Gram Stain Result No WBC Seen      No Epithelial Cells Seen      No organisms seen    AFB stain Bronchioalveolar Lavage, Left Lower Lobe [[id number]]  (Normal) Collected: 08/08/24 1315    Specimen: Bronch Lavage from Bronchioalveolar Lavage, Left Lower Lobe Updated: 08/09/24 1303     AFB Stain No acid fast bacilli seen    Sputum Culture (Lab Only) Bronchioalveolar Lavage, Left Lower Lobe [[id number]]  (Normal) Collected: 08/08/24 1315    Specimen: Bronch Lavage from Bronchioalveolar Lavage, Left Lower Lobe Updated: 08/09/24 0745     Culture No growth to date            Imaging:  I have reviewed the imaging from the last 24 hours    Objective       -Vascular: DP pulses are faintly palpable B/L. PT pulses are faintly palpable B/L.   B/L edema to the foot, ankle and leg. Capillary refill time is less than 3 seconds B/L.   Skin temperature is warm to slightly cool from leg to toes B/L.     -Ortho: + active df/pf of all digits B/L. Ankle ROM normal B/L. MMT 5/5 in all planes tested B/L.  Multiple digital contractures noted B/L  -Neuro: Light touch and protective sensation is intact B/L    -Derm:   RLE:   -Well adhered eschar noted to the distal aspect of the second digit.  No drainage, malodor, deep probing, fluctuance, crepitus noted  -Small fibrotic wound noted to medial malleolus measuring approximately 1 cm x 1 cm x 0.1 cm.  Wound bed is dry.  No malodor, fluctuance, crepitus, drainage, deep probing noted  LLE:  -Full-thickness fibrotic wound noted to medial aspect of hallux measuring approximately 1.5 cm x 1 cm x 0.2 cm.  No drainage, malodor, deep probing, fluctuance, crepitus noted.  Wound edges intact.  -Small fibrotic wound noted to medial malleolus measuring approximately 1 cm x 1 cm x 0.1 cm.  Wound bed is dry.  No malodor, fluctuance, crepitus, drainage, deep probing noted  -Early gangrenous changes noted to distal aspect of left second toe measuring approximately 0.5 cm x  0.5 cm.  There is a small blister noted just plantar to these changes measuring approximately 0.4 cm x 0.4 cm.  Upon deroofing of blister, no deep probing is noted and minimal serous drainage noted.  No further fluctuance, crepitus, malodor, deep probing noted.     See media tab for clinical pictures.       ASSESSMENT:  63 y.o. female being consulted for necrosis of the left second toe along with other chronic BLE wounds    PLAN:  -Patient was evaluated and treated with all questions and concerns addressed  -Patient was dressed with betadine, and Mepilex.  Medihoney was ordered for the left hallux for future changes.  Dressings will be changed while in house freq: Every 2 to 3 days  -Patient may weight bear to BLE.   -Labs reviewed: See above  -We recommend that ENRRIQUE/PVR are ordered to assess circulation  -DVT prophylaxsis and pain control per primary team.  -Rest of care per primary team  -Thank you for this consult  -Please contact on call podiatry resident with any questions or concerns.    Nelson Pratt DPM, PGY-2  Pager #[phone]

## 2024-08-09 NOTE — PLAN OF CARE
Care Coordination Discharge Plan Summary    Admission Assessment Summary    General Information  Readmission Within the last 30 days: previous discharge plan unsuccessful  Does patient have a : No  Patient-Specific Goals (include timeframe): to go home    Living Arrangements  Arrived From: home  Current Living Arrangements: home  People in Home: alone    Social Determinants of Health - Screenings  Housing Stability  In the last 12 months, was there a time when you were not able to pay the mortgage or rent on time?: No  In the last 12 months, was there a time when you did not have a steady place to sleep or slept in a shelter (including now)?: No  Utility Access  In the past 12 months has the electric, gas, oil, or water company threatened to shut off services in your home?: No  Transportation Needs  In the past 12 months, has lack of transportation kept you from medical appointments or from getting medications?: No  In the past 12 months, has lack of transportation kept you from meetings, work, or from getting things needed for daily living?: No    Functional Status Prior to Admission  Assistive Device/Animal Currently Used at Home: walker, standard, wheelchair, oxygen, stair glide  Functional Status Comments:    IADL Comments:      Discharge Needs Assessment    Concerns to be Addressed: discharge planning  Current Discharge Risk: chronically ill, lives alone  Anticipated Changes Related to Illness: inability to care for self    Discharge Plan Summary    Patient Choice      Concerns / Comments: assessment completed with pt address/pcp/insurance/pharm verified.  pt has home O2, pt is unsure of company name (as per Chart review - O2 [facility]) pt confirmed O2 equipment is in working condition. pt does not have portable tank at bedside,  pt will [facility] [facility]  family will come in to get house key to retreive portable O2 at home and then come back to transport pt. pt  will reach out if assistance needed with transport at [place]. pt is current with [facility] and would like to resume their service at [place].    8/9/24 7908 Sent referral to [facility] via [facility] for VN/PT/OT. Please fax D/C instructions sheets to [facility] @ (f) [phone] upon D/C.     Discharge Plan:  Disposition/Destination: Home Health Care - Other / Home     Connection to Community     Community Resources: N/A    Discharge Transportation:  Is Out of Hospital DNR needed at Discharge: no  Does patient need discharge transport? N/A

## 2024-08-09 NOTE — ASSESSMENT & PLAN NOTE
"In regards to the chest pain. Pt has chronic chest pain which is what prompted her work up for lung pathology, however she described this chest pain as \"bony chest pain \".  Exam and history suggest MSK etiology vs serous pain from her autoimmune disease.  No concern for ACS. Pain management.  "

## 2024-08-09 NOTE — PLAN OF CARE
Plan of Care Review  Progress: no change  Outcome Evaluation: pt aaox4, vss, abd pain MD aware, sinus on monitor, POD saw pt for B/L toe wounds, pt on 3L NC, new onset abd pain MD aware, facial edema, trop X2 drawn, tylenol q6, lidocaine patches for flank and chest (bone) pain

## 2024-08-10 ENCOUNTER — APPOINTMENT (INPATIENT)
Dept: RADIOLOGY | Facility: HOSPITAL | Age: 64
DRG: 515 | End: 2024-08-10
Attending: STUDENT IN AN ORGANIZED HEALTH CARE EDUCATION/TRAINING PROGRAM
Payer: COMMERCIAL

## 2024-08-10 ENCOUNTER — APPOINTMENT (INPATIENT)
Dept: RADIOLOGY | Facility: HOSPITAL | Age: 64
DRG: 515 | End: 2024-08-10
Attending: NURSE PRACTITIONER
Payer: COMMERCIAL

## 2024-08-10 PROBLEM — K59.09 OTHER CONSTIPATION: Status: ACTIVE | Noted: 2024-08-10

## 2024-08-10 PROBLEM — R52 PAIN: Status: ACTIVE | Noted: 2024-08-10

## 2024-08-10 LAB
ATRIAL RATE: 72
MICROORGANISM SPEC CULT: NORMAL
P AXIS: 38
PR INTERVAL: 142
QRS DURATION: 92
QT INTERVAL: 428
QTC CALCULATION(BAZETT): 468
R AXIS: 4
T WAVE AXIS: 90
VENTRICULAR RATE: 72

## 2024-08-10 PROCEDURE — 63700000 HC SELF-ADMINISTRABLE DRUG: Performed by: HOSPITALIST

## 2024-08-10 PROCEDURE — 63700000 HC SELF-ADMINISTRABLE DRUG: Performed by: INTERNAL MEDICINE

## 2024-08-10 PROCEDURE — 21400000 HC ROOM AND CARE CCU/INTERMEDIATE

## 2024-08-10 PROCEDURE — 93923 UPR/LXTR ART STDY 3+ LVLS: CPT

## 2024-08-10 PROCEDURE — 71045 X-RAY EXAM CHEST 1 VIEW: CPT

## 2024-08-10 PROCEDURE — 25800000 HC PHARMACY IV SOLUTIONS: Performed by: STUDENT IN AN ORGANIZED HEALTH CARE EDUCATION/TRAINING PROGRAM

## 2024-08-10 PROCEDURE — 63700000 HC SELF-ADMINISTRABLE DRUG: Performed by: NURSE PRACTITIONER

## 2024-08-10 PROCEDURE — 99233 SBSQ HOSP IP/OBS HIGH 50: CPT | Performed by: STUDENT IN AN ORGANIZED HEALTH CARE EDUCATION/TRAINING PROGRAM

## 2024-08-10 PROCEDURE — 63700000 HC SELF-ADMINISTRABLE DRUG: Performed by: STUDENT IN AN ORGANIZED HEALTH CARE EDUCATION/TRAINING PROGRAM

## 2024-08-10 PROCEDURE — 93925 LOWER EXTREMITY STUDY: CPT

## 2024-08-10 PROCEDURE — 99497 ADVNCD CARE PLAN 30 MIN: CPT | Mod: 25 | Performed by: NURSE PRACTITIONER

## 2024-08-10 PROCEDURE — 25000000 HC PHARMACY GENERAL: Performed by: STUDENT IN AN ORGANIZED HEALTH CARE EDUCATION/TRAINING PROGRAM

## 2024-08-10 PROCEDURE — 99223 1ST HOSP IP/OBS HIGH 75: CPT | Mod: 25 | Performed by: NURSE PRACTITIONER

## 2024-08-10 PROCEDURE — 93010 ELECTROCARDIOGRAM REPORT: CPT | Performed by: INTERNAL MEDICINE

## 2024-08-10 RX ORDER — ETHACRYNIC ACID 25 MG/1
50 TABLET ORAL DAILY
Status: DISCONTINUED | OUTPATIENT
Start: 2024-08-11 | End: 2024-08-27

## 2024-08-10 RX ORDER — MELATONIN 5 MG
5 CAPSULE ORAL ONCE
Status: COMPLETED | OUTPATIENT
Start: 2024-08-10 | End: 2024-08-10

## 2024-08-10 RX ORDER — CYCLOBENZAPRINE HCL 5 MG
5 TABLET ORAL 3 TIMES DAILY PRN
Status: DISCONTINUED | OUTPATIENT
Start: 2024-08-10 | End: 2024-09-03 | Stop reason: HOSPADM

## 2024-08-10 RX ORDER — PREGABALIN 75 MG/1
75 CAPSULE ORAL 2 TIMES DAILY
Status: DISCONTINUED | OUTPATIENT
Start: 2024-08-10 | End: 2024-08-11

## 2024-08-10 RX ORDER — TRAMADOL HYDROCHLORIDE 50 MG/1
100 TABLET ORAL EVERY 6 HOURS PRN
Status: DISCONTINUED | OUTPATIENT
Start: 2024-08-10 | End: 2024-08-12

## 2024-08-10 RX ADMIN — POLYETHYLENE GLYCOL 3350 17 G: 17 POWDER, FOR SOLUTION ORAL at 21:18

## 2024-08-10 RX ADMIN — SILDENAFIL 20 MG: 20 TABLET, FILM COATED ORAL at 21:18

## 2024-08-10 RX ADMIN — BENZOCAINE 6 MG-MENTHOL 10 MG LOZENGES 1 LOZENGE: at 17:12

## 2024-08-10 RX ADMIN — SILDENAFIL 20 MG: 20 TABLET, FILM COATED ORAL at 10:24

## 2024-08-10 RX ADMIN — AMLODIPINE BESYLATE 10 MG: 10 TABLET ORAL at 10:24

## 2024-08-10 RX ADMIN — ACETAMINOPHEN 975 MG: 325 TABLET ORAL at 12:30

## 2024-08-10 RX ADMIN — PREGABALIN 75 MG: 75 CAPSULE ORAL at 21:18

## 2024-08-10 RX ADMIN — BENZOCAINE 6 MG-MENTHOL 10 MG LOZENGES 1 LOZENGE: at 12:30

## 2024-08-10 RX ADMIN — LEVOTHYROXINE SODIUM 100 MCG: 0.1 TABLET ORAL at 08:06

## 2024-08-10 RX ADMIN — SALINE NASAL SPRAY 2 SPRAY: 1.5 SOLUTION NASAL at 10:25

## 2024-08-10 RX ADMIN — ETHACRYNATE SODIUM 50 MG: 50 INJECTION, POWDER, LYOPHILIZED, FOR SOLUTION INTRAVENOUS at 13:09

## 2024-08-10 RX ADMIN — Medication 5 MG: at 00:28

## 2024-08-10 RX ADMIN — CYANOCOBALAMIN TAB 1000 MCG 1000 MCG: 1000 TAB at 10:24

## 2024-08-10 RX ADMIN — SENNOSIDES 2 TABLET: 8.6 TABLET, FILM COATED ORAL at 10:24

## 2024-08-10 RX ADMIN — ACETAMINOPHEN 975 MG: 325 TABLET ORAL at 05:05

## 2024-08-10 RX ADMIN — LIDOCAINE 4% 1 PATCH: 40 PATCH TOPICAL at 10:25

## 2024-08-10 RX ADMIN — BENZOCAINE 6 MG-MENTHOL 10 MG LOZENGES 1 LOZENGE: at 21:30

## 2024-08-10 RX ADMIN — PREGABALIN 75 MG: 75 CAPSULE ORAL at 10:42

## 2024-08-10 RX ADMIN — POLYETHYLENE GLYCOL 3350 17 G: 17 POWDER, FOR SOLUTION ORAL at 10:25

## 2024-08-10 RX ADMIN — PANTOPRAZOLE SODIUM 20 MG: 20 TABLET, DELAYED RELEASE ORAL at 10:24

## 2024-08-10 RX ADMIN — SILDENAFIL 20 MG: 20 TABLET, FILM COATED ORAL at 13:09

## 2024-08-10 RX ADMIN — BENZONATATE 100 MG: 100 CAPSULE ORAL at 10:41

## 2024-08-10 RX ADMIN — SENNOSIDES 2 TABLET: 8.6 TABLET, FILM COATED ORAL at 21:18

## 2024-08-10 ASSESSMENT — COGNITIVE AND FUNCTIONAL STATUS - GENERAL
MOVING TO AND FROM BED TO CHAIR: 3 - A LITTLE
CLIMB 3 TO 5 STEPS WITH RAILING: 2 - A LOT
WALKING IN HOSPITAL ROOM: 3 - A LITTLE
STANDING UP FROM CHAIR USING ARMS: 3 - A LITTLE
CLIMB 3 TO 5 STEPS WITH RAILING: 2 - A LOT
WALKING IN HOSPITAL ROOM: 3 - A LITTLE
STANDING UP FROM CHAIR USING ARMS: 3 - A LITTLE
MOVING TO AND FROM BED TO CHAIR: 3 - A LITTLE

## 2024-08-10 NOTE — PROGRESS NOTES
Pulmonary/Critical Care  Progress Note       SUBJECTIVE   Feels ok, still some chest wall pain.    OBJECTIVE   Vital Signs:   Temp:  [36.1 °C (97 °F)-37 °C (98.6 °F)] 36.5 °C (97.7 °F)  Heart Rate:  [62-98] 65  Resp:  [18-19] 18  BP: (131-181)/(63-88) 137/71   Physical Exam  Elderly appearing female  Neck taut  CTA b/l  RRR. Abd soft  No LE edema  Awake alert conversant   Diagnostic Data   Lab Results   Component Value Date    WBC 9.38 08/09/2024    HGB 10.7 (L) 08/09/2024     08/09/2024     08/09/2024    K 3.6 08/09/2024    CO2 24 08/09/2024    BUN 18 08/09/2024    CREATININE 1.3 (H) 08/09/2024    ANIONGAP 10 08/09/2024        ASSESSMENT AND PLAN     Patient is a 64 y/o female w/ hx of scleroderma c/b PH who presented for ION navigational bronchoscopy for LLL PET-avid lung nodule, post-operatively she developed chest pain and was c/f AECHF.    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        Gregory Miranda DO  Pulmonary & Critical Care Fellow, PGY-5

## 2024-08-10 NOTE — PLAN OF CARE
Plan of Care Review  Progress: no change  Outcome Evaluation: pt aaox4, vss, 10/10 pain throughout shift (abd and chest (done) ), PRN throat lozenges given, US legs done, bowel reg given but no bowel movement, 2L NC, continue to monitor pain

## 2024-08-10 NOTE — ASSESSMENT & PLAN NOTE
- Debility likely multifactorial in the setting of multiple chronic medical conditions including scleroderma, CHF, PVD s/p LLE angio c/b retroperitoneal hematoma.   - Living situation prior to hospitalization: Lives at home with 24/7 aides  - Baseline functional status is: Ambulatory short distances  - Current Palliative Performance Status: 40%  - Baseline Palliative Performance Status:  50-60%    - Frailty   - Patient remains high risk for further deterioration and functional decline.    Plan  - Patient open to both palliative bridge and home based palliative NP visits

## 2024-08-10 NOTE — NURSING NOTE
Pt complaining of chest wall pain and abdominal pain throughout night. No relief with scheduled tylenol, heating pad and lidocaine patch applied. BOWSER when walking to bathroom, SpO2 97-99% on 2L O2.

## 2024-08-10 NOTE — CONSULTS
[facility]      Patient Name: Arielle Hammondstower                                                                                        Patient MRN: [id number]  Date / Time Note Created: 8/10/2024 2:24 PM  Attending Physician: Grant Watts DO  Referring Physician: Joel Rodriguez MD  Primary Care Physician: Sara Simmons MD   This is Hospital Day: 3    Conversation/Goals of Care:  Life-prolonging/disease directed    Assessment/Plan  Other constipation  Assessment & Plan  -LBM : Yesterday x 3  -Constipation likely multifactorial r/t current hospitalization, heart failure on diuretic therapy, debility related to multiple medical comorbid conditions  -CT abdomen 8/9 with moderate colonic stool burden       Plan:  - 1 packet Miralax BID  - Senokot x2 tabs PO, BID, Scheduled - hold for diarrhea  - Bisacodyl 10mg SC, qDay, PRN - if no BM in >2 days  - Monitor bowel patterns and adjust bowel regimen PRN.      Pain  Assessment & Plan  - Chronic pain attributed to: chronic chest pain, scleroderma c/b pulmonary HTN/ILD, Raynaud's Disease  - Pain worsened after bronchoscopy  - Patient with numerous allergies to various pain medications (opioids/NSAIDS)  - PDMP queried: no recent scripts  - Home regimen: lidocaine patch  - Medications available inpatient: Acetaminophen 975 mg p.o. every 6 hours, lidocaine patches, pregabalin 75 mg p.o. twice daily and 50 mg p.o. nightly, cyclobenzaprine 5 mg p.o. 3 times daily as needed, tramadol 100 mg p.o. every 6 hours as needed    Plan:  - Per Pain Management rec's  - Would establish care with a pain management provider      Palliative care by specialist  Assessment & Plan  63 y.o. female with a PMH significant for severe scleroderma complicated by pulmonary hypertension, ILD.  She presented to [facility] [place] on 8/8 for planned robotic navigational and EBUS bronchoscopies to evaluate growing LLL nodule c/b chest pain and volume overload.     - Code status: Full Code  -  Prognosis:  High risk for acute deterioration and decline  - Capacity for Medical Decision Making: intact  - Advance Directives: No  - Goals of care: Goals are Life-prolonging/disease directed.  Refer to ACP note dated 8/10.  - Surrogate Decision Maker: Patient identifies her 2 adult children, Tiffanie and Wagner, as joint surrogate decision makers        Debility  Assessment & Plan  - Debility likely multifactorial in the setting of multiple chronic med conditions including scleroderma and CHF with current hospitalization  - Living situation prior to hospitalization: Lives at home with 24/7 aides  - Baseline functional status is: Ambulatory short distances  - Current Palliative Performance Status: 30%  - Baseline Palliative Performance Status:  50-60%    - Frailty   - Patient remains high risk for further deterioration and functional decline.    Plan  - Patient open to both palliative bridge and home based palliative NP visits              Reason for Consult:  Assistance with clarification of goals of care    HPI      Source: Chart review, medical team, patient    Arielle Felix is an 63 y.o. female with a PMH significant for severe scleroderma complicated by pulmonary hypertension, ILD.  [place] [facility] [place] for planned robotic navigational and EBUS bronchoscopies to evaluate growing LLL nodule c/b chest pain and volume overload.     Discussed case with Dr. Watts, Palliative Care consulted for assistance with GOC.    Patient seen at bedside 1435.  Patient was able to provide history and articulate an understanding of her current clinical condition.  She reports that she came to the hospital for a biopsy given the concerns for lung cancer.  After the procedure, she developed severe chest pain with radiation to left side and abdominal pain.  Also reports there may be concerns of gangrene to one of her left toes.    Currently, patient reports severe/constant central chest pain with radiation to L side,  "rating it 12/10.  Patient describes the pain as \"bone pain\" \"someone beat me up\" and \"pressure\".  She was seen by pain management this hospitalization, however, interventions have not been helpful.  She reports having multiple allergies to pain medications making it difficult to manage her pain.  She does not recall following up with a pain management specialist outpatient.  Patient also still with generalized abdominal pain and issues with constipation.  LBM yesterday x 3,.  No other complaints offered.    Review of Systems:  All other systems reviewed and negative except as noted in the HPI.      Chart Review:  The following portions of the patient’s history were reviewed and updated as appropriate:    Code Status History:  Current Code Status: Full Code    Past Family History  Family History   Problem Relation Age of Onset    Heart disease Biological Brother         stent    Breast cancer Niece     Cervical cancer Neg Hx     Uterine cancer Neg Hx     Colon cancer Neg Hx     Ovarian cancer Neg Hx        Past Medical History  Past Medical History:   Diagnosis Date    At risk for falls     De Quervain's tenosynovitis     Essential (primary) hypertension     Follicular carcinoma of thyroid (CMS/HCC)     Gastro-esophageal reflux     Hand ulceration (CMS/HCC)     Hyperlipidemia, unspecified     Interstitial lung disease (CMS/HCC)     Leg ulcer (CMS/HCC)     Lung nodule     Lupus (CMS/HCC)     O2 dependent     On 2L    Osteomyelitis of hand (CMS/HCC)     Osteoporosis     Pulmonary hypertension (CMS/HCC)     PVD (peripheral vascular disease) (CMS/HCC)     Raynaud's disease     Severe    Reflux esophagitis     Scleroderma (CMS/HCC)     Screening mammogram for breast cancer 05/04/2021    BI-RADS category: 1 - Negative (care everywhere @ [facility])    Vertigo     Wound, open, foot     bilateral,dressing change with medihoney and mupirocin ointment by homecare nurse       Past Surgical History  Past Surgical History:   Procedure " Laterality Date    CARDIAC CATHETERIZATION      COLONOSCOPY      HERNIA REPAIR      THYROIDECTOMY         Social History   Social History     Socioeconomic History    Marital status:      Spouse name: None    Number of children: None    Years of education: None    Highest education level: None   Tobacco Use    Smoking status: Former     Types: Cigarettes     Quit date: 9/15/2005     Years since quittin.9    Smokeless tobacco: Never    Tobacco comments:     Would smoke 1/2 of 1/2 PPD, quit in    Vaping Use    Vaping Use: Never used   Substance and Sexual Activity    Alcohol use: Never    Drug use: Never    Sexual activity: Not Currently     Birth control/protection: Post-menopausal     Social Determinants of Health     Food Insecurity: No Food Insecurity (2024)    Hunger Vital Sign     Worried About Running Out of Food in the Last Year: Never true     Ran Out of Food in the Last Year: Never true   Transportation Needs: No Transportation Needs (2024)    PRAPARE - Transportation     Lack of Transportation (Medical): No     Lack of Transportation (Non-Medical): No   Housing Stability: Low Risk  (2024)    Housing Stability Vital Sign     Unable to Pay for Housing in the Last Year: No     Number of Places Lived in the Last Year: 1     Unstable Housing in the Last Year: No       Voodoo:  Pentecostalism      Home Medications:    amLODIPine, Take 10 mg by mouth every morning.    atorvastatin, Take 1 tablet (40 mg total) by mouth daily.    benzonatate, Take 100 mg by mouth 3 (three) times a day as needed for cough.    biotin, Take 1,000 mcg by mouth daily.    cholecalciferol (vitamin D3), Take 1,000 Units by mouth daily.    collagenase, Apply 1 Application topically daily.    cyanocobalamin (vitamin B-12), Take 1,000 mcg by mouth daily.    levothyroxine, Take 100 mcg by mouth daily. BRAND ONLY    MEDIHONEY, HONEY, TOP, Apply topically daily.    mupirocin, Apply 1 application. topically daily. Behind  ears    OPSUMIT, Take 1 tablet (10 mg total) by mouth daily.    sildenafiL (pulm.hypertension), Take 1 tablet (20 mg total) by mouth 3 (three) times a day.    docusate sodium, Take 100 mg by mouth daily as needed for constipation.    hydrocortisone, Apply to affected area 2 times daily    lidocaine, Apply 1 patch topically daily as needed (pain). Remove & discard patch within 12 hours or as directed by prescriber.    loperamide, Take 2 mg by mouth every 8 (eight) hours as needed for diarrhea.    meclizine, Take 25 mg by mouth daily as needed.    pantoprazole, Take 20 mg by mouth daily.    sodium chloride, Administer 2 sprays into each nostril daily.    ACTEMRA, Infuse 8 mg/kg into a venous catheter every 28 (twentyeight) days.  [facility] query reviewed with no concerns.    Current Medications:    acetaminophen, 975 mg, oral, q6h SPENCER    amLODIPine, 10 mg, oral, q AM    atorvastatin, 40 mg, oral, Daily (6p)    benzocaine-menthol, 1 lozenge, Mouth/Throat, q2h PRN    benzonatate, 100 mg, oral, 3x daily PRN    cyanocobalamin, 1,000 mcg, oral, Daily    cyclobenzaprine, 5 mg, oral, 3x daily PRN    glucose, 16-32 g of dextrose, oral, PRN **OR** dextrose, 15-30 g of dextrose, oral, PRN **OR** glucagon, 1 mg, intramuscular, PRN **OR** dextrose 50 % in water (D50), 25 mL, intravenous, PRN    docusate sodium, 100 mg, oral, Daily PRN    honey, , Topical, Daily PRN    levothyroxine, 100 mcg, oral, Daily    lidocaine, 1 patch, Topical, Daily    lidocaine, 1 patch, Topical, Daily    macitentan, 10 mg, oral, Daily    pantoprazole, 20 mg, oral, Daily    polyethylene glycol, 17 g, oral, BID    polyethylene glycol, 17 g, oral, 2x daily PRN    pregabalin, 50 mg, oral, Nightly    pregabalin, 75 mg, oral, BID    senna, 2 tablet, oral, BID    sildenafiL (pulm.hypertension), 20 mg, oral, TID    sodium chloride, 2 spray, Each Nostril, Daily    traMADoL, 100 mg, oral, q6h PRN    white petrolatum, , Topical, q4h  "PRN    ethacrynic acid    Allergies:  Allergies   Allergen Reactions    Aspirin Shortness of breath     Other reaction(s): Unknown  \"stop breathing\"      Ibuprofen Shortness of breath     Stop breathing    Iodine Shortness of breath     Other reaction(s): Unknown    Iodine And Iodide Containing Products Shortness of breath     ? rash    Morphine Shortness of breath      Reported wamrth of face, improved with benadryl and cold compresses after getting morphine as well as episode of (subjective ) dyspnea, and thought she passed out - did have wamrth and erythema of face with mild edema R>L cheek notably on exam.     Penicillins Shortness of breath     Other reaction(s): Unknown    Sulfa (Sulfonamide Antibiotics) Angioedema    Clindamycin     Hydrochlorothiazide      Other reaction(s): Abdominal Pain   Slow heart rate    Oxycodone      Other reaction(s): Vomiting    Sulfamethoxazole-Trimethoprim      Other reaction(s): Vomiting, Weakness     Latex Rash       Objective    Physical Exam:    General:   No Acute Distress, chronically ill appearing, frail  Eyes:  Sclera Anicteric  ENMT:  Mucus Membranes Moist  CV:  RRR  Respiratory:  nonlabored  Psych:  Appropriate and Cooperative  Neuro:  Awake and Alert  Musculoskeletal: 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      Vitals:  Vitals:    08/10/24 1115   BP: 137/71   Pulse: 65   Resp: 18   Temp: 36.5 °C (97.7 °F)   SpO2: 95%       Intake & Output:  I/O last 3 completed shifts:  In: 1383 [P.O.:1383]  Out: 1850 [Urine:1850]      Laboratory Studies:  CBC Results      Results from last 7 days   Lab Units 08/09/24  1938   WBC K/uL 9.38   HEMOGLOBIN g/dL 10.7*   HEMATOCRIT % 36.7   PLATELETS K/uL 285            CMP Results      Results from last 7 days   Lab Units 08/09/24  1938   SODIUM mEQ/L 140   POTASSIUM mEQ/L 3.6   CHLORIDE mEQ/L 106   CO2 mEQ/L 24   BUN mg/dL 18   CREATININE mg/dL 1.3*   CALCIUM mg/dL 9.5   GLUCOSE mg/dL 97          Troponin I Results " "     No results found for: \"TROPONINT\"       ABG Results                     Imaging and Other Studies:     Ultrasound PVR lower extremity    Result Date: 8/10/2024  IMPRESSION: Ankle-Brachial Indices as above.    Ultrasound arterial leg bilateral    Result Date: 8/10/2024  IMPRESSION: Findings concerning for hemodynamically significant stenosis within the bilateral popliteal arteries and distal left femoral artery. There are also findings which can be seen with inflow stenosis at the level left common femoral artery. CTA of the abdomen and pelvis with lower extremity runoff could be performed for further evaluation if clinically indicated.    X-RAY CHEST 1 VIEW    Result Date: 8/10/2024  IMPRESSION: Ongoing interstitial thickening suggestive of pulmonary edema, similar to prior.    CT ABDOMEN PELVIS WITHOUT IV CONTRAST    Result Date: 8/9/2024  IMPRESSION: Bibasilar airspace opacity/chronic areas of fissural thickening are seen in the lower lobes.  There is persistent left pleural effusion appreciated. Cardiomegaly again noted with small moderate pericardial effusion.. Moderate colonic stool burden especially through to the transverse colon.     X-RAY CHEST 1 VIEW    Result Date: 8/9/2024  IMPRESSION: Stable.     X-RAY CHEST 1 VIEW    Result Date: 8/8/2024  IMPRESSION: 1.  Stable marked cardiomegaly. 2.  Airspace opacities similar to prior.     FL FLUOROSCOPY TECHNICAL ASSISTANCE    Result Date: 8/8/2024  IMPRESSION: Technical assistance was provided for fluoroscopy, reference operative note.  Fluoro time is 3.4 minutes. Dose is 108.40 mGy.    X-RAY CHEST 1 VIEW    Result Date: 8/8/2024  IMPRESSION: 1. No pneumothorax following left lung biopsy. 2. Diffuse interstitial infiltrates with alveolar infiltrate throughout the majority of the left lung, and moderate size layering left pleural effusion. COMMENT:  Portable chest x-ray is compared with 8/5/2024. There is cardiomegaly with diffuse bilateral interstitial " infiltrates. Alveolar infiltrate is present throughout the majority of the left lung, with layering moderate-sized pleural effusion. The right lung is partially obscured due to patient rotation. There is no pneumothorax.    X-RAY CHEST 1 VIEW    Result Date: 8/5/2024  IMPRESSION: Cardiomegaly with continued left basilar airspace opacity and interstitial opacities.    CT ANGIOGRAPHY CHEST PULMONARY EMBOLISM WITH IV CONTRAST    Addendum Date: 7/25/2024    IMPRESSION ADDENDUM: No evidence for pulmonary embolism. Persistent left-sided pleural effusion and airspace disease at the left lung base.  There is a heterogeneous nodule in the medial aspect of the left lower lobe measuring 1.6 x 1.9 cm.  This area demonstrated increased PET uptake on previous study. Underlying interstitial lung disease.     Result Date: 7/25/2024  IMPRESSION: No evidence for pulmonary embolism. Persistent left-sided pleural effusion and airspace disease at the left lung base. Underlying interstitial lung disease.     CT ABDOMEN PELVIS WITH IV CONTRAST    Result Date: 7/25/2024  IMPRESSION: No evidence for acute inflammatory obstructive process in the abdomen and pelvis.  Specifically no evidence for diverticulitis.  There is moderate fecal retention within the colon. Left lower pole renal lesion which likely reflects a hemorrhagic/proteinaceous cyst.  Please see discussion below. COMMENT: Technique: CT examination of the abdomen and pelvis was performed utilizing contiguous 2.5 mm transaxial sections. Coronal and sagittal reformations are obtained. Following medication was administered: 100mL of iopamidoL (ISOVUE-370) 370 mg iodine /mL (76 %) injection 100 mL CT DOSE:  One or more dose reduction techniques (e.g. automated exposure control, adjustment of the mA and/or kV according to patient size, use of iterative reconstruction technique) utilized for this examination Comparison studies: CT from 11/27/2020.  Recent PET scan from 6/14/2024. Lung  bases: Left-sided pleural effusion, left parenchymal consolidation, and findings of interstitial lung disease.  More nodular density in the medial aspect of the left lower lobe measuring 1.6 x 1.9 cm.  Cardiomegaly with a pericardial effusion. Liver: The liver is normal in size.  There is no focal mass.  Hepatic veins and portal veins are patent without focal filling defects to suggest thrombosis.. Gallbladder:  Small dependent gallstones.  No gallbladder wall thickening.. Spleen: Spleen is normal in size without focal mass lesion.. Pancreas: The pancreas is normal without focal mass, parenchymal atrophy, or pancreatic duct dilation.. Adrenals: The adrenals are normal bilaterally without focal mass.. Kidneys, ureters and bladder:  Symmetric enhancement and excretion..  Left lower pole cystic lesion measures 3.2 cm in maximal dimension.  This measures greater than simple fluid density.  This measured 60 Hounsfield units on the noncontrast CT from 2020.  It measures 36 Hounsfield units on today's study.  Therefore it is most in keeping with a hemorrhagic/proteinaceous cyst.  Additional hypodensities too small to characterize. Retroperitoneal structures: There is no abdominal aortic aneurysm. Atherosclerotic calcification.  There is no retroperitoneal adenopathy or retroperitoneal mass.. Bowel and mesentery: No evidence of a focal inflammatory or obstructive process. Moderate fecal retention throughout the colon.  There are a few fluid-filled small bowel loops in the pelvis, nonspecific. Lymph nodes: No pathologic lymphadenopathy.. Pelvis: The uterus is normal in size.  The ovaries are normal.  There is no adnexal mass or pelvic free fluid. Bones: Within normal limits.    X-RAY CHEST 1 VIEW    Result Date: 7/25/2024  IMPRESSION: Decreased left pleural effusion. Unchanged pulmonary edema in setting of cardiomegaly.     ULTRASOUND CHEST    Result Date: 7/23/2024  IMPRESSION: Thoracentesis deferred. This service rendered  by Zohra Vidal PA-C I certify that I have personally reviewed this examination and agree with Zohra Vidal's report. Russ Rodriguez M.D.     CT HEAD WITHOUT IV CONTRAST    Result Date: 7/21/2024  IMPRESSION: 1.  No evidence of acute territorial infarction, acute intracranial hemorrhage, or mass effect. 2.  Partial opacification of the paranasal sinuses which may be secondary to sinusitis.    CT ANGIOGRAPHY CHEST PULMONARY EMBOLISM WITH IV CONTRAST    Result Date: 7/19/2024  IMPRESSION: 1.  Negative evaluation for pulmonary embolism. 2.  New small left pleural effusion. Increased irregular left basilar airspace disease and ongoing 16mm nodule, which was hypermetabolic on recent PET/CT. Malignancy with pleural metastatic disease is not excluded. Consider diagnostic thoracentesis. 3.  New/increased airspace disease also in the dependent and basilar right lung suggesting pneumonia or pneumonitis. 4.  Suspected mild pulmonary edema. 5.  Moderate pericardial effusion, mildly increased. 6.  New small partially loculated left pleural effusion. 7.  Mildly enlarged mediastinal and hilar lymph nodes, which may be inflammatory or metastatic in nature. In accordance with [place] Act 112,  the patient will receive a letter notifying them to follow up with their physician.    X-RAY CHEST 1 VIEW    Result Date: 7/19/2024  IMPRESSION: Unchanged marked cardiomegaly (with known pericardial effusion on prior CT) and vascular congestion with diffuse interstitial opacity likely reflecting mild edema. Coarse basilar interstitial opacities related to known fibrotic lung disease is less well appreciated radiographically. Left basilar pleural-parenchymal opacity probably related to a concomitant small pleural effusion and associated retrocardiac atelectasis or consolidation.           I have independently reviewed the pertinent imaging to the time of note and agree with reported results. CXR reviewed- no ICD noted 8/10/24    Labs  reviewed.  Cr 1.3  Hgb 10.7-anemia          Discussed with Medical Team, RN.     Thank you for the opportunity to participate in this patient's hospital plan of care.     KARYNA Arzola  Palliative Care Nurse Practitioner  [facility]  [phone] [facility] [place] office  [phone] [facility] [place] Fax  [address] [place] [address]  Pager [phone]

## 2024-08-10 NOTE — ASSESSMENT & PLAN NOTE
"- Chronic pain attributed to: chronic chest pain, scleroderma c/b pulmonary HTN/ILD, Raynaud's Disease  - New right lower abdominal pain worse after LLE angio on 8/14, extensive stool burden may also be contributory.   -Left upper chest/rib pain improved, left foot and right groin pain is unchanged.   - Patient with numerous allergies to various pain medications (opioids/NSAIDS). She reports no relief with Fentanyl.  She has used dilaudid in the past with some relief.   - PDMP queried: no recent scripts  - Home regimen: lidocaine patch  - Medications available inpatient: Acetaminophen 975 mg p.o. every 6 hours, lidocaine patches, cyclobenzaprine 5 mg p.o. 3 times daily as needed, heating pad  - Tramadol d/c'd 2/2 dizziness  - lyrica d/c'd 2/2 blurred vision  -Continue Gabapentin to 200 mg TID (continue to monitor renal function in the setting of n/v)  -Recommend discontinuing dilaudid at the patient's request as she believes it causes her to be \"delirious\".   -Agree w/ ATC Tylenol 975 mg every 6 hours     Plan:  - Follow up with pain management as outpt      "

## 2024-08-10 NOTE — PLAN OF CARE
Plan of Care Review  Plan of Care Reviewed With: patient  Progress: no change  Outcome Evaluation: Pt AAOx4 complaining of 10/10 abdominal and chest pain. Abd distended and firm, CT Abd completed. Additional lidocaine patch ordered for pain, scheduled tylenol given. Call bell within reach, bed alarmed

## 2024-08-10 NOTE — PROGRESS NOTES
Hospital Medicine Service -  Daily Progress Note       SUBJECTIVE   Still reporting pain essentially diffusely all across her chest.  Reporting osseous pain mostly on the left, seems to be along the entire rib both anteriorly and posteriorly.  Any sort of palpation produces severe pain.    Reports pain is worse than before.   OBJECTIVE      Vital signs in last 24 hours:  Temp:  [36.1 °C (97 °F)-37 °C (98.6 °F)] 36.5 °C (97.7 °F)  Heart Rate:  [62-98] 65  Resp:  [18-19] 18  BP: (131-181)/(63-88) 137/71    Intake/Output Summary (Last 24 hours) at 8/10/2024 1155  Last data filed at 8/10/2024 0600  Gross per 24 hour   Intake 843 ml   Output 1850 ml   Net -1007 ml       PHYSICAL EXAMINATION      NAD. Head NCAT. Heart RRR. No murmurs. Lungs CTAB. No wheezes or crackles. Abdomen soft, non tender. LE no edema. Skin with no lesions, mood affect appropriate.     Tenderness to palpation along entirety of the ribs on the left side anteriorly and posteriorly.    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  Left foot wound unchanged.   LINES, CATHETERS, DRAINS, AIRWAYS, AND WOUNDS   Lines, Drains, and Airways:  Wounds (agree with documentation and present on admission):  Peripheral IV (Adult) 08/08/24 Anterior;Left;Proximal Forearm (Active)   Number of days: 2       Wound Anterior;Right Toe (2nd) (Active)   Number of days: 2       Wound Anterior;Left Toe (Great) (Active)   Number of days: 2       Wound Anterior;Left Toe (2nd) (Active)   Number of days: 2         Comments:      LABS / IMAGING / TELE          ASSESSMENT AND PLAN      Chest pain  Assessment & Plan  Chest pain has been ongoing  Exam and history suggest MSK  Pain control is difficult as patient has various allergies, including morphine, oxycodone, NSAIDs.    She has a lidocaine patch, she does not think it is helping much  Consulted pain  Advised physicians of Lyrica, Flexeril, tramadol    I am not sure how much effects were going to get out of these.    Cardiology  consulted    Interstitial lung disease (CMS/Formerly [facility])  Assessment & Plan  Does not appear to be in exacerbation for this  Continue supportive care    Acute on chronic heart failure with preserved ejection fraction (CMS/Formerly [facility])  Assessment & Plan  Unclear if this is completely due to heart.  Regardless she is volume overloaded    Checked x-ray this a.m., no significant changes    Cardiology on  Advised IV ethacrynic acid  -They had advised 2 doses yesterday, only got 1, as such redosed to this a.m.    Follow intake and output-suboptimal diuresis thus far    Scleroderma (CMS/[facility])  Assessment & Plan  Again, chronic  Tocilizumab every 28 days    Pulmonary hypertension (CMS/Formerly [facility])  Assessment & Plan  Chronic, extensive history    Having her daughter bring in her macitentan  On sildenafil  Also offloading with ethacrynic acid    Essential (primary) hypertension  Assessment & Plan  Pressure stable here, continue home medications         VTE Assessment: Padua    VTE Prophylaxis:  Current anticoagulants:    None      Code Status: Full Code      Estimated Discharge Date: 8/11/2024   Disposition Planning: F/U ENRRIQUE/PVR, Arterial US. Giving ethacrynic acid again this AM as only got one dose yesterday. Added pain measures. Monitor until tomorrow at rafael Watts, DO  8/10/2024

## 2024-08-10 NOTE — PROGRESS NOTES
Per updates from DCP W/E rounds, pt is not stable for DC home today. ENRIQUE is Sunday pending progress. [facility] HC following. Fax AVS to [phone] upon DC. Pt has O2 via MM. Will need port tank brought in at Dc for transport home. Family to provide transport home. [facility] will continue to offer support and follow for needs until DC complete.

## 2024-08-10 NOTE — ACP (ADVANCE CARE PLANNING)
"Palliative Care Advance Care Planning Note      Patient Name: Arielle Forresterwer                                                                                        Patient MRN: [id number]  Discussion Date: 08/10/24   Discussion Participants: Patient and myself  Start time: 1440  End time: 1505    Today participants wished to discuss Advance Care Planning. The following summarizes our discussion.    During our visit today, we discussed:     Code Status  Full Code    Counseled patient regarding the benefits and burdens of CPR and mechanical ventilation in the setting of current clinical condition and co-morbidities.  Discussed code status options and limitations of resuscitation, including possibility of further debility if resuscitation attempt is successful.  Also discussed that resuscitation will not reverse or cure underlying medical issues and that a chance of meaningful recovery after cardiac arrest is incredibly low.      Patient states a full code status is in line with their values/preferences stating \"I want you to try\".  She does clarify that she would not want to be dependent on machines to live with no hope of meaningful recovery.  If that were to occur, she would want her family to opt for comfort directed care at end-of-life.  She would not want to suffer and she knows her daughters would not want her to suffer either.        Health Care Agent/Surrogate Decision Maker   Patient identifies her 2 adult children, Tiffanie and Wagner, as joint surrogate decision makers     Goals of Care  Goals are Life-prolonging/disease directed     Bedside discussion with patient regarding her current hospitalization.  Patient was able to provide history and articulate an understanding of her current clinical condition.  She understands the current workup with concerns for lung cancer.  She had thyroid cancer in the past s/p surgery and treatment so she is familiar with the process.  If the biopsy confirms " cancer, she would want to follow-up with oncology to identify treatment options moving forward.  Her priority is to recover and return home.  Her goal is to live to see her great grandchildren grow up.    Patient counseled, at length, regarding options moving forward: continued life prolonging medical interventions vs a comfort approach. Further discussed the addition of home Palliative support while continuing with disease directed therapies vs Hospice support if goals should transition for comfort directed care at end of life. Patient given ample time for questions. All questions answered.     Given patient's goals are life-prolonging and disease directed, I recommended that she return home with both palliative bridge and home-based palliative NP visits.  Patient is open to home palliative support.      Advance Care Documents  No    Patient Capacity  Patient has capacity to make their own medical decisions.        Plan  -DC home with both palliative bridge and home-based palliative NP visits  -Palliative Care will continue to follow to provide support and assist with complex medical decision making.      Attestation Statement:  I have spent a total of 25 minutes in face-to-face discussion of patient advance care planning with the patient and/or surrogate decision makers.  No active management of the patient’s problem(s) was undertaken during the time period reported      KARYNA Arzola  8/10/2024 3:38 PM

## 2024-08-10 NOTE — PROGRESS NOTES
64 yo with a history of pulmonary hypertension WHO Group I, NYHA/WHO FC III on Sildenafil 20 mg TID and Macitentan, systemic scleroderma, raynaud's, ILD, PE, CAD, lung mass currently being evaluated who was admitted following lung biopsy in the setting of pulmonary edema.     She is being diuresed with ethacrynic acid. JVP yesterday 12 cm H2O w trace LE edema.     Pulmonary edema  Pulmonary hypertension  Scleroderma, Raynauds  ILD  CAD  HTN    Charted as -1 L yesterday. No weights. Labs pending. Cxr maybe slightly better but low quality study.     Exam is certainly difficult       - Would again give Ethacrynic Acid IV 50 mg BID today and increase home dose to 50 mg daily tomorrow.   - Continue Sildenafil 20 mg TID   - Continue uninterrupted Macitentan 10 mg daily  - Continue remainder of cardiac medications including amlodipine 10 mg, atorvastatin.

## 2024-08-10 NOTE — ASSESSMENT & PLAN NOTE
63 y.o. female with a PMH significant for severe scleroderma complicated by pulmonary hypertension, ILD.  She presented to [facility] on 8/8 for planned robotic navigational and EBUS bronchoscopies to evaluate growing LLL nodule c/b chest pain, volume overload, n/v and pain.  Lung biopsy results are c/f adenocarcinoma.     - Code status: Full Code  - Prognosis:  High risk for acute deterioration and decline  - Capacity for Medical Decision Making: intact  - Advance Directives: No  - Goals of care: Goals are Life-prolonging/disease directed.  Refer to ACP note dated 8/10. Plan for GOC discussion 8/30 at 1pm   - Surrogate Decision Maker: Patient identifies her 2 adult children, Tiffanie and Wagner, as joint surrogate decision makers

## 2024-08-10 NOTE — ASSESSMENT & PLAN NOTE
-LBM : 8/29 soft  -improved constipation since Methylnaltrexone was initiated on 8/23  -constipation likely multifactorial r/t current hospitalization, heart failure on diuretic therapy, debility related to multiple medical comorbid conditions, opioids and patient previously declining bowel regimen.   -CT abdomen 8/9 with moderate colonic stool burden   -KUB 8/22 & 8/23  showing extensive colon stool burden      Plan:  - Agree w/ Senna 2 tablets BID

## 2024-08-11 ENCOUNTER — APPOINTMENT (INPATIENT)
Dept: RADIOLOGY | Facility: HOSPITAL | Age: 64
DRG: 515 | End: 2024-08-11
Attending: STUDENT IN AN ORGANIZED HEALTH CARE EDUCATION/TRAINING PROGRAM
Payer: COMMERCIAL

## 2024-08-11 ENCOUNTER — APPOINTMENT (INPATIENT)
Dept: RADIOLOGY | Facility: HOSPITAL | Age: 64
DRG: 515 | End: 2024-08-11
Attending: HOSPITALIST
Payer: COMMERCIAL

## 2024-08-11 PROBLEM — H57.10 EYE PAIN: Status: ACTIVE | Noted: 2024-08-11

## 2024-08-11 PROBLEM — S81.802A WOUND OF LEFT LEG: Status: ACTIVE | Noted: 2024-08-11

## 2024-08-11 PROBLEM — H57.13 PAIN OF BOTH EYES: Status: ACTIVE | Noted: 2024-08-11

## 2024-08-11 PROCEDURE — 63700000 HC SELF-ADMINISTRABLE DRUG: Performed by: INTERNAL MEDICINE

## 2024-08-11 PROCEDURE — 99233 SBSQ HOSP IP/OBS HIGH 50: CPT | Performed by: STUDENT IN AN ORGANIZED HEALTH CARE EDUCATION/TRAINING PROGRAM

## 2024-08-11 PROCEDURE — 63700000 HC SELF-ADMINISTRABLE DRUG: Performed by: HOSPITALIST

## 2024-08-11 PROCEDURE — 21400000 HC ROOM AND CARE CCU/INTERMEDIATE

## 2024-08-11 PROCEDURE — 63700000 HC SELF-ADMINISTRABLE DRUG: Performed by: STUDENT IN AN ORGANIZED HEALTH CARE EDUCATION/TRAINING PROGRAM

## 2024-08-11 PROCEDURE — 25800000 HC PHARMACY IV SOLUTIONS: Performed by: STUDENT IN AN ORGANIZED HEALTH CARE EDUCATION/TRAINING PROGRAM

## 2024-08-11 PROCEDURE — 25000000 HC PHARMACY GENERAL: Performed by: STUDENT IN AN ORGANIZED HEALTH CARE EDUCATION/TRAINING PROGRAM

## 2024-08-11 PROCEDURE — 70480 CT ORBIT/EAR/FOSSA W/O DYE: CPT

## 2024-08-11 PROCEDURE — 200200 PR NO CHARGE: Performed by: HOSPITALIST

## 2024-08-11 PROCEDURE — 99232 SBSQ HOSP IP/OBS MODERATE 35: CPT | Performed by: STUDENT IN AN ORGANIZED HEALTH CARE EDUCATION/TRAINING PROGRAM

## 2024-08-11 PROCEDURE — 63700000 HC SELF-ADMINISTRABLE DRUG: Performed by: NURSE PRACTITIONER

## 2024-08-11 PROCEDURE — 93880 EXTRACRANIAL BILAT STUDY: CPT

## 2024-08-11 RX ORDER — ARTIFICIAL TEARS 1; 2; 3 MG/ML; MG/ML; MG/ML
1 SOLUTION/ DROPS OPHTHALMIC 4 TIMES DAILY
Status: DISCONTINUED | OUTPATIENT
Start: 2024-08-11 | End: 2024-08-25

## 2024-08-11 RX ORDER — LIDOCAINE 560 MG/1
1 PATCH PERCUTANEOUS; TOPICAL; TRANSDERMAL DAILY
Status: DISCONTINUED | OUTPATIENT
Start: 2024-08-11 | End: 2024-09-03 | Stop reason: HOSPADM

## 2024-08-11 RX ORDER — CARBOXYMETHYLCELLULOSE SODIUM 5 MG/ML
1 SOLUTION/ DROPS OPHTHALMIC 3 TIMES DAILY PRN
Status: DISCONTINUED | OUTPATIENT
Start: 2024-08-11 | End: 2024-09-03 | Stop reason: HOSPADM

## 2024-08-11 RX ADMIN — LIDOCAINE 4% 1 PATCH: 40 PATCH TOPICAL at 08:32

## 2024-08-11 RX ADMIN — GLYCERIN 1 DROP: .002; .002; .01 SOLUTION/ DROPS OPHTHALMIC at 17:20

## 2024-08-11 RX ADMIN — BENZOCAINE 6 MG-MENTHOL 10 MG LOZENGES 1 LOZENGE: at 01:45

## 2024-08-11 RX ADMIN — POLYETHYLENE GLYCOL 3350 17 G: 17 POWDER, FOR SOLUTION ORAL at 08:31

## 2024-08-11 RX ADMIN — LIDOCAINE 4% 1 PATCH: 40 PATCH TOPICAL at 02:28

## 2024-08-11 RX ADMIN — DEXTRAN 70, GLYCERIN, HYPROMELLOSE 1 DROP: 1; 2; 3 SOLUTION/ DROPS OPHTHALMIC at 21:26

## 2024-08-11 RX ADMIN — LEVOTHYROXINE SODIUM 100 MCG: 0.1 TABLET ORAL at 08:31

## 2024-08-11 RX ADMIN — POLYETHYLENE GLYCOL 3350 17 G: 17 POWDER, FOR SOLUTION ORAL at 21:26

## 2024-08-11 RX ADMIN — DEXTRAN 70, GLYCERIN, HYPROMELLOSE 1 DROP: 1; 2; 3 SOLUTION/ DROPS OPHTHALMIC at 17:19

## 2024-08-11 RX ADMIN — ACETAMINOPHEN 975 MG: 325 TABLET ORAL at 21:26

## 2024-08-11 RX ADMIN — SENNOSIDES 2 TABLET: 8.6 TABLET, FILM COATED ORAL at 08:31

## 2024-08-11 RX ADMIN — SILDENAFIL 20 MG: 20 TABLET, FILM COATED ORAL at 21:27

## 2024-08-11 RX ADMIN — CYANOCOBALAMIN TAB 1000 MCG 1000 MCG: 1000 TAB at 08:30

## 2024-08-11 RX ADMIN — AMLODIPINE BESYLATE 10 MG: 10 TABLET ORAL at 08:30

## 2024-08-11 RX ADMIN — ACETAMINOPHEN 975 MG: 325 TABLET ORAL at 13:08

## 2024-08-11 RX ADMIN — PANTOPRAZOLE SODIUM 20 MG: 20 TABLET, DELAYED RELEASE ORAL at 08:30

## 2024-08-11 RX ADMIN — BENZOCAINE 6 MG-MENTHOL 10 MG LOZENGES 1 LOZENGE: at 14:37

## 2024-08-11 RX ADMIN — SILDENAFIL 20 MG: 20 TABLET, FILM COATED ORAL at 13:08

## 2024-08-11 RX ADMIN — ETHACRYNATE SODIUM 50 MG: 50 INJECTION, POWDER, LYOPHILIZED, FOR SOLUTION INTRAVENOUS at 01:24

## 2024-08-11 RX ADMIN — ETHACRYNIC ACID 50 MG: 25 TABLET ORAL at 09:46

## 2024-08-11 RX ADMIN — SALINE NASAL SPRAY 2 SPRAY: 1.5 SOLUTION NASAL at 08:32

## 2024-08-11 RX ADMIN — SENNOSIDES 2 TABLET: 8.6 TABLET, FILM COATED ORAL at 21:27

## 2024-08-11 RX ADMIN — BENZOCAINE 6 MG-MENTHOL 10 MG LOZENGES 1 LOZENGE: at 21:38

## 2024-08-11 RX ADMIN — SILDENAFIL 20 MG: 20 TABLET, FILM COATED ORAL at 08:31

## 2024-08-11 RX ADMIN — ACETAMINOPHEN 975 MG: 325 TABLET ORAL at 01:24

## 2024-08-11 RX ADMIN — BENZONATATE 100 MG: 100 CAPSULE ORAL at 17:31

## 2024-08-11 RX ADMIN — GLYCERIN 1 DROP: .002; .002; .01 SOLUTION/ DROPS OPHTHALMIC at 21:32

## 2024-08-11 RX ADMIN — WHITE PETROLATUM: 1.75 OINTMENT TOPICAL at 21:27

## 2024-08-11 ASSESSMENT — COGNITIVE AND FUNCTIONAL STATUS - GENERAL
WALKING IN HOSPITAL ROOM: 3 - A LITTLE
CLIMB 3 TO 5 STEPS WITH RAILING: 2 - A LOT
WALKING IN HOSPITAL ROOM: 3 - A LITTLE
MOVING TO AND FROM BED TO CHAIR: 3 - A LITTLE
STANDING UP FROM CHAIR USING ARMS: 3 - A LITTLE
MOVING TO AND FROM BED TO CHAIR: 3 - A LITTLE
CLIMB 3 TO 5 STEPS WITH RAILING: 2 - A LOT
STANDING UP FROM CHAIR USING ARMS: 3 - A LITTLE

## 2024-08-11 NOTE — CONSULTS
"     Ophthalmology Consultation       CHIEF COMPLAINT   No chief complaint on file.       HISTORY OF PRESENT ILLNESS      This is a 63 y.o. 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      Ophthalmology was consulted for concerns of right eye swelling and  blurry vision.     Patient reports that she began to experience right eye swelling, pain and blurry vision yesterday evening.  She describes the pain to be a deep ache both around and within the eye.  The blurry vision is described as a \"haze\".   She reports at the onset, that vision was near completely black in both eyes, with the left eye vision returning to baseline.  She endorses being able to see figures and outlines, but states this is far below her normal visual baseline.     She feels that all of her symptoms began shortly after receiving a medication yesterday, but is unable to recall which medication was given.  Prior to yesterday evening, she denies any ocular pain, swelling or visual changes.  She denies any recent headaches or jaw claudication. She denies any recent ocular trauma and states that this has not happened in the past.     She denies any flashes or floaters.      She denies any personal or family history of ocular disease.  She reported seeing an eye doctor in the past, but is unable to recall their name.  She was last seen one year ago.  She has worn bifocals in the past for distance and near vision, but had stopped wearing them secondary to discomfort and risk with falls.   PAST MEDICAL AND SURGICAL HISTORY      POHx:  None to report      Past Medical History:   Diagnosis Date    At risk for falls     De Quervain's tenosynovitis     Essential (primary) hypertension     Follicular carcinoma of thyroid (CMS/HCC)     Gastro-esophageal reflux     Hand " ulceration (CMS/HCC)     Hyperlipidemia, unspecified     Interstitial lung disease (CMS/HCC)     Leg ulcer (CMS/HCC)     Lung nodule     Lupus (CMS/HCC)     O2 dependent     On 2L    Osteomyelitis of hand (CMS/HCC)     Osteoporosis     Pulmonary hypertension (CMS/HCC)     PVD (peripheral vascular disease) (CMS/HCC)     Raynaud's disease     Severe    Reflux esophagitis     Scleroderma (CMS/HCC)     Screening mammogram for breast cancer 05/04/2021    BI-RADS category: 1 - Negative (care everywhere @ [facility])    Vertigo     Wound, open, foot     bilateral,dressing change with medihoney and mupirocin ointment by homecare nurse     Past Surgical History:   Procedure Laterality Date    CARDIAC CATHETERIZATION      COLONOSCOPY      HERNIA REPAIR      THYROIDECTOMY         MEDICATIONS        Current Facility-Administered Medications:     acetaminophen (TYLENOL) tablet 975 mg, 975 mg, oral, q6h SPENCER, Molly Hidalgo CRNP, 975 mg at 08/11/24 0124    amLODIPine (NORVASC) tablet 10 mg, 10 mg, oral, q AM, Joel Rodriguez MD, 10 mg at 08/11/24 0830    atorvastatin (LIPITOR) tablet 40 mg, 40 mg, oral, Daily (6p), Joel Rodriguez MD    benzocaine-menthol (CEPACOL/CHLORASEPTIC) lozenge 1 lozenge, 1 lozenge, Mouth/Throat, q2h PRN, Grant Watts DO, 1 lozenge at 08/11/24 0145    benzonatate (TESSALON) capsule 100 mg, 100 mg, oral, 3x daily PRN, Joel Rodriguez MD, 100 mg at 08/10/24 1041    carboxymethylcellulose (REFRESH PLUS) 0.5 % ophthalmic dropperette 1 drop, 1 drop, Right Eye, 3x daily PRN, Neela Bender DO    cyanocobalamin (VITAMIN B12) tablet 1,000 mcg, 1,000 mcg, oral, Daily, Joel Rodriguez MD, 1,000 mcg at 08/11/24 0830    cyclobenzaprine (FLEXERIL) tablet 5 mg, 5 mg, oral, 3x daily PRN, Grant Watts DO    glucose chewable tablet 16-32 g of dextrose, 16-32 g of dextrose, oral, PRN **OR** dextrose 40 % oral gel 15-30 g of dextrose, 15-30 g of dextrose, oral, PRN **OR** glucagon (GLUCAGEN) injection 1 mg, 1 mg,  intramuscular, PRN **OR** dextrose 50 % in water (D50) injection 12.5 g, 25 mL, intravenous, PRN, Joel Rodriguez MD    docusate sodium (COLACE) capsule 100 mg, 100 mg, oral, Daily PRN, Joel Rodriguez MD    ethacrynic acid (EDECRIN) tablet 50 mg, 50 mg, oral, Daily, Grant Watts DO, 50 mg at 08/11/24 0946    honey (MEDIHONEY) 100 % topical paste, , Topical, Daily PRN, Nelson Pratt DPM    levothyroxine (SYNTHROID) tablet 100 mcg, 100 mcg, oral, Daily, Joel Rodriguez MD, 100 mcg at 08/11/24 0831    lidocaine (ASPERCREME) 4 % topical patch 1 patch, 1 patch, Topical, Daily, Joel Rodriguez MD, 1 patch at 08/11/24 0832    lidocaine (ASPERCREME) 4 % topical patch 1 patch, 1 patch, Topical, Daily, Neela Bender DO, 1 patch at 08/11/24 0832    lidocaine (ASPERCREME) 4 % topical patch 1 patch, 1 patch, Topical, Daily, Amira Logan MD, 1 patch at 08/11/24 0228    macitentan (OPSUMIT) tablet 10 mg (Patient Owned), 10 mg, oral, Daily, Grant Watts DO, 10 mg at 08/11/24 0846    pantoprazole (PROTONIX) tablet,delayed release (DR/EC) 20 mg, 20 mg, oral, Daily, Joel Rodriguez MD, 20 mg at 08/11/24 0830    peg 400-hypromellose-glycerin (ARTIFICIAL TEARS) 1-0.2-0.2 % eye drops 1 drop, 1 drop, Both Eyes, q4h while awake, Jerson Summers DO    polyethylene glycol (MIRALAX) 17 gram packet 17 g, 17 g, oral, BID, Corinne Ortez DO, 17 g at 08/11/24 0831    polyethylene glycol (MIRALAX) 17 gram packet 17 g, 17 g, oral, 2x daily PRN, Teri Castañeda MD    pregabalin (LYRICA) capsule 50 mg, 50 mg, oral, Nightly, Sherie Boles, KARYNA    pregabalin (LYRICA) capsule 75 mg, 75 mg, oral, BID, Grant Watts DO, 75 mg at 08/10/24 2118    senna (SENOKOT) tablet 2 tablet, 2 tablet, oral, BID, Teri Castañeda MD, 2 tablet at 08/11/24 0831    sildenafiL (pulm.hypertension) (REVATIO) tablet 20 mg, 20 mg, oral, TID, Joel Rodriguez MD, 20 mg at 08/11/24 0831    sodium chloride (OCEAN) 0.65 % nasal spray 2 spray, 2 spray,  Each Nostril, Daily, Joel Rodriguez MD, 2 spray at 24 0832    traMADoL (ULTRAM) tablet 100 mg, 100 mg, oral, q6h PRN, Grant Watts,     white petrolatum (AQUAPHOR) ointment, , Topical, q4h PRN, Molly Hidalgo CRNP, Given at 24 1224    white petrolatum-mineral oil (ARTIFICIAL TEARS OINT) ophthalmic ointment, , Both Eyes, Nightly, Jerson Summers DO  ALLERGIES      Aspirin, Ibuprofen, Iodine, Iodine and iodide containing products, Morphine, Penicillins, Sulfa (sulfonamide antibiotics), Clindamycin, Hydrochlorothiazide, Oxycodone, Sulfamethoxazole-trimethoprim, and Latex  FAMILY HISTORY      Family History   Problem Relation Age of Onset    Heart disease Biological Brother         stent    Breast cancer Niece     Cervical cancer Neg Hx     Uterine cancer Neg Hx     Colon cancer Neg Hx     Ovarian cancer Neg Hx      SOCIAL HISTORY      Social History     Tobacco Use    Smoking status: Former     Types: Cigarettes     Quit date: 9/15/2005     Years since quittin.9    Smokeless tobacco: Never    Tobacco comments:     Would smoke 1/2 of 1/2 PPD, quit in    Substance Use Topics    Alcohol use: Never     REVIEW OF SYSTEMS      12 pt ROS negative unless listed in HPI  OPHTHALMIC EXAMINATION      Vital signs over last 24 hours  Temp:  [36.4 °C (97.5 °F)-36.9 °C (98.4 °F)] 36.4 °C (97.5 °F)  Heart Rate:  [60-88] 78  Resp:  [18-20] 20  BP: (116-142)/(56-74) 116/64   Right Eye Left Eye   Visual Acuity with readers 20/400. NI with pinhole. 20/40   Pupils Round and Reactive, No RAPD Round and Reactive, No RAPD   Intraocular pressure Tonopen 18mm Hg  17mm Hg   Color Unable to perform.  8/8 fast       Extraocular muscles (EOM): Orthophoric , variable through exam             80     80                                   80     80            \     /                                          \    /                     100 -  OD - 100                        100 -  OS - 100              /    \                                           /     \                 100   100                                100    100    Visual fields: Unable to perform OD, Full Left Eye     Anterior 20D Exam:     Right Eye Left Eye   Orbits/lids/adnexa Lita-orbital skin is taut and firm, without fluctuance, erythema or ecchymosis.  Ptilosis of lid margin. Mild lita-orbital edema noted, RUL>RLL. No evidence of mass or proptosis. Lagophthalmos Lita-orbital skin is taut and firm. No evidence of mass, proptosis, ptosis or ecchymosis. Lagophthalmos   Sclera/conjunctiva White and Quiet White and Quiet   Cornea  Decreased tear film. 1+ Diffuse PEE. (-) Javier  Clear, without epithelial defects, staining, opacity, or infiltrate; (-) Javier    Anterior chamber Well formed, no hypopyon or hyphema Well formed, no hypopyon or hyphema   Iris No rubeosis or defect No rubeosis or defect   Lens  2+ NS with cortical changes  2+ NS with cortical changes       Dilated Fundus Exam  1gtt Phenylephrine/Tropicamide OU @ 12:05 PM     Right Eye Left Eye   Vitreous Medium clear without heme, cells, or opacity Medium clear without heme, cells, or opacity   Nerve Healthy, pink without heme, edema, pallor, or exudate Healthy, pink without heme, edema, pallor, or exudate   Macula Flat with normal contour  Flat with normal contour   Vessels No emboli or sheathing No emboli and sheathing   Periphery Intact 360, no RH/RT/RD Intact 360, no RH/RT/RD     LABS / IMAGING / STUDIES      Labs  Labs reviewed in EMR  Imaging  CT ORBITS AND SELLA WITHOUT IV CONTRAST    Result Date: 8/11/2024  IMPRESSION: No evidence of acute process involving the orbits. Preliminary report was provided by overnight radiologist, Dr. Shelley Carroll, at 4:48 AM on 8/11/2024.    Ultrasound PVR lower extremity    Result Date: 8/10/2024  IMPRESSION: Ankle-Brachial Indices as above.    Ultrasound arterial leg bilateral    Result Date: 8/10/2024  IMPRESSION: Findings concerning for hemodynamically significant stenosis  within the bilateral popliteal arteries and distal left femoral artery. There are also findings which can be seen with inflow stenosis at the level left common femoral artery. CTA of the abdomen and pelvis with lower extremity runoff could be performed for further evaluation if clinically indicated.    X-RAY CHEST 1 VIEW    Result Date: 8/10/2024  IMPRESSION: Ongoing interstitial thickening suggestive of pulmonary edema, similar to prior.    CT ABDOMEN PELVIS WITHOUT IV CONTRAST    Result Date: 8/9/2024  IMPRESSION: Bibasilar airspace opacity/chronic areas of fissural thickening are seen in the lower lobes.  There is persistent left pleural effusion appreciated. Cardiomegaly again noted with small moderate pericardial effusion.. Moderate colonic stool burden especially through to the transverse colon.     X-RAY CHEST 1 VIEW    Result Date: 8/9/2024  IMPRESSION: Stable.     X-RAY CHEST 1 VIEW    Result Date: 8/8/2024  IMPRESSION: 1.  Stable marked cardiomegaly. 2.  Airspace opacities similar to prior.     FL FLUOROSCOPY TECHNICAL ASSISTANCE    Result Date: 8/8/2024  IMPRESSION: Technical assistance was provided for fluoroscopy, reference operative note.  Fluoro time is 3.4 minutes. Dose is 108.40 mGy.    X-RAY CHEST 1 VIEW    Result Date: 8/8/2024  IMPRESSION: 1. No pneumothorax following left lung biopsy. 2. Diffuse interstitial infiltrates with alveolar infiltrate throughout the majority of the left lung, and moderate size layering left pleural effusion. COMMENT:  Portable chest x-ray is compared with 8/5/2024. There is cardiomegaly with diffuse bilateral interstitial infiltrates. Alveolar infiltrate is present throughout the majority of the left lung, with layering moderate-sized pleural effusion. The right lung is partially obscured due to patient rotation. There is no pneumothorax.    X-RAY CHEST 1 VIEW    Result Date: 8/5/2024  IMPRESSION: Cardiomegaly with continued left basilar airspace opacity and  interstitial opacities.    CT ANGIOGRAPHY CHEST PULMONARY EMBOLISM WITH IV CONTRAST    Addendum Date: 7/25/2024    IMPRESSION ADDENDUM: No evidence for pulmonary embolism. Persistent left-sided pleural effusion and airspace disease at the left lung base.  There is a heterogeneous nodule in the medial aspect of the left lower lobe measuring 1.6 x 1.9 cm.  This area demonstrated increased PET uptake on previous study. Underlying interstitial lung disease.     Result Date: 7/25/2024  IMPRESSION: No evidence for pulmonary embolism. Persistent left-sided pleural effusion and airspace disease at the left lung base. Underlying interstitial lung disease.     CT ABDOMEN PELVIS WITH IV CONTRAST    Result Date: 7/25/2024  IMPRESSION: No evidence for acute inflammatory obstructive process in the abdomen and pelvis.  Specifically no evidence for diverticulitis.  There is moderate fecal retention within the colon. Left lower pole renal lesion which likely reflects a hemorrhagic/proteinaceous cyst.  Please see discussion below. COMMENT: Technique: CT examination of the abdomen and pelvis was performed utilizing contiguous 2.5 mm transaxial sections. Coronal and sagittal reformations are obtained. Following medication was administered: 100mL of iopamidoL (ISOVUE-370) 370 mg iodine /mL (76 %) injection 100 mL CT DOSE:  One or more dose reduction techniques (e.g. automated exposure control, adjustment of the mA and/or kV according to patient size, use of iterative reconstruction technique) utilized for this examination Comparison studies: CT from 11/27/2020.  Recent PET scan from 6/14/2024. Lung bases: Left-sided pleural effusion, left parenchymal consolidation, and findings of interstitial lung disease.  More nodular density in the medial aspect of the left lower lobe measuring 1.6 x 1.9 cm.  Cardiomegaly with a pericardial effusion. Liver: The liver is normal in size.  There is no focal mass.  Hepatic veins and portal veins are  patent without focal filling defects to suggest thrombosis.. Gallbladder:  Small dependent gallstones.  No gallbladder wall thickening.. Spleen: Spleen is normal in size without focal mass lesion.. Pancreas: The pancreas is normal without focal mass, parenchymal atrophy, or pancreatic duct dilation.. Adrenals: The adrenals are normal bilaterally without focal mass.. Kidneys, ureters and bladder:  Symmetric enhancement and excretion..  Left lower pole cystic lesion measures 3.2 cm in maximal dimension.  This measures greater than simple fluid density.  This measured 60 Hounsfield units on the noncontrast CT from 2020.  It measures 36 Hounsfield units on today's study.  Therefore it is most in keeping with a hemorrhagic/proteinaceous cyst.  Additional hypodensities too small to characterize. Retroperitoneal structures: There is no abdominal aortic aneurysm. Atherosclerotic calcification.  There is no retroperitoneal adenopathy or retroperitoneal mass.. Bowel and mesentery: No evidence of a focal inflammatory or obstructive process. Moderate fecal retention throughout the colon.  There are a few fluid-filled small bowel loops in the pelvis, nonspecific. Lymph nodes: No pathologic lymphadenopathy.. Pelvis: The uterus is normal in size.  The ovaries are normal.  There is no adnexal mass or pelvic free fluid. Bones: Within normal limits.    X-RAY CHEST 1 VIEW    Result Date: 7/25/2024  IMPRESSION: Decreased left pleural effusion. Unchanged pulmonary edema in setting of cardiomegaly.     ULTRASOUND CHEST    Result Date: 7/23/2024  IMPRESSION: Thoracentesis deferred. This service rendered by Zohra Vidal PA-C I certify that I have personally reviewed this examination and agree with Zohra Vidal's report. Russ Rodriguez M.D.     CT HEAD WITHOUT IV CONTRAST    Result Date: 7/21/2024  IMPRESSION: 1.  No evidence of acute territorial infarction, acute intracranial hemorrhage, or mass effect. 2.  Partial opacification of the  paranasal sinuses which may be secondary to sinusitis.    CT ANGIOGRAPHY CHEST PULMONARY EMBOLISM WITH IV CONTRAST    Result Date: 7/19/2024  IMPRESSION: 1.  Negative evaluation for pulmonary embolism. 2.  New small left pleural effusion. Increased irregular left basilar airspace disease and ongoing 16mm nodule, which was hypermetabolic on recent PET/CT. Malignancy with pleural metastatic disease is not excluded. Consider diagnostic thoracentesis. 3.  New/increased airspace disease also in the dependent and basilar right lung suggesting pneumonia or pneumonitis. 4.  Suspected mild pulmonary edema. 5.  Moderate pericardial effusion, mildly increased. 6.  New small partially loculated left pleural effusion. 7.  Mildly enlarged mediastinal and hilar lymph nodes, which may be inflammatory or metastatic in nature. In accordance with [place] Act 112,  the patient will receive a letter notifying them to follow up with their physician.    X-RAY CHEST 1 VIEW    Result Date: 7/19/2024  IMPRESSION: Unchanged marked cardiomegaly (with known pericardial effusion on prior CT) and vascular congestion with diffuse interstitial opacity likely reflecting mild edema. Coarse basilar interstitial opacities related to known fibrotic lung disease is less well appreciated radiographically. Left basilar pleural-parenchymal opacity probably related to a concomitant small pleural effusion and associated retrocardiac atelectasis or consolidation.       ASSESSMENT AND PLAN     Intra-ocular Pain, Right Eye  Doris-orbital Swelling, Both Eyes OD>OS  Amaurosis, Both Eyes   Blurry Vision, Right Eye   History of Cutaneous Scleroderma   Ophthalmology consulted for concerns for right eye pain, swelling and blurry vision that began yesterday evening after taking Lyrica.   Endorses amaurosis OU, with improvement in vision OS.  Vision remained decreased OD.   Imaging:  CT orbits and sella w/o contrast:   Impression: No evidence of acute process involving the  orbits.   On exam:  Base: VA 20/400 and 20/40, NI with pinhole.  No evidence of RAPD.  Unable to perform CP in right eye, 8/8 left. EOMS restricted primarily in upgaze, OD>OS, variable throughout the encounter. 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  Anterior:  Doris-orbital skin appears taut and firm. Doris-orbital swelling noted, OD>OS without evidence of erythema or fluctuance.   Cornea with 1+ PEE OD.   Nuclear sclerotic cataracts OU  Posterior:  Within normal limits.  No RT, RD, or RH.  No evidence of optic disc pallor or edema.   Plan:  Impression: No evidence of pre-septal or orbital cellulitis.  Doris-orbital swelling, skin tightness and lid thickening consistent with diagnosis of cutaneous scleroderma.  EOM's mildly reduced, primarily in superior gaze are variable throughout exam which is also consistent with scleroderma.  In regards to the diminished Visual acuity,  it is important to address the patients ocular surface dryness due to lagophthalmos and decreased tear production from scleroderma.  Must rule out causes of ocular ischemia given reported episode of amaurosis overnight in both eyes. Lyrica can be associated with blurry vision and disorientation, both of which the patient states she experienced.  Recommend artificial tears six times daily, both eyes.   Recommend artificial tear ointment QHS at bedtime.   Aquaphor to both eyelids BID.   Discontinue Lyrica  In regards to the reported amaurosis with persistent diminished vision OD, would recommend carotid doppler US to rule out causes of ocular ischemia. Patient had recent outpatient echo.  If ocular pain and symptoms are not improved with topical lubrication therapy, can consider MRI orbits WWO gadolinium to assess for retrobulbar neuritis which can be associated with systemic scleroderma.      The patient was counseled and confirmed understanding.  The patient will follow up with Ophthalmic Services in 2 weeks  Location: [facility] Building [address].    Call for appointment [phone]    The above case was discussed with Dr. Davidson who agrees with assessment and plan above.    Thank you for this consultation.  Please call with additional questions or further concerns.

## 2024-08-11 NOTE — PROGRESS NOTES
Hospital Medicine Service -  Daily Progress Note       SUBJECTIVE   Right eye pain, blurry vision, swelling late last night.  They obtained a CT of the orbits which was negative.    They also consulted ophthalmology.    Reports her chest pain is continuing, however does not seem to be as bothered by it today as yesterday    Tells me she had left-sided blurriness of vision, right-sided pain and then improvement of the left-sided blurriness yesterday.    Ultrasound ENRRIQUE with various deficiencies, but seems worse in the left PT at 0.62.  Arterial ultrasound with hemodynamically significant stenosis in the bilateral popliteal arteries and distal FEM.   OBJECTIVE      Vital signs in last 24 hours:  Temp:  [36.4 °C (97.5 °F)-36.9 °C (98.4 °F)] 36.4 °C (97.5 °F)  Heart Rate:  [60-88] 78  Resp:  [18-20] 20  BP: (116-142)/(56-74) 116/64    Intake/Output Summary (Last 24 hours) at 8/11/2024 1515  Last data filed at 8/10/2024 1915  Gross per 24 hour   Intake --   Output 500 ml   Net -500 ml       PHYSICAL EXAMINATION      NAD. Head NCAT. Heart RRR. No murmurs.  Still with tenderness.  Lungs CTAB. No wheezes or crackles. Abdomen soft, non tender. LE no edema. Skin with no lesions, mood affect appropriate.  Right eye patch    There is digit amputation/hypoplasia   LINES, CATHETERS, DRAINS, AIRWAYS, AND WOUNDS   Lines, Drains, and Airways:  Wounds (agree with documentation and present on admission):  Peripheral IV (Adult) 08/08/24 Anterior;Left;Proximal Forearm (Active)   Number of days: 3       Wound Anterior;Right Toe (2nd) (Active)   Number of days: 3       Wound Anterior;Left Toe (Great) (Active)   Number of days: 3       Wound Anterior;Left Toe (2nd) (Active)   Number of days: 3         Comments:      LABS / IMAGING / TELE          ASSESSMENT AND PLAN      Chest pain  Assessment & Plan  Chest pain has been ongoing  Exam and history suggest MSK  Pain control is difficult as patient has various allergies, including  morphine, oxycodone, NSAIDs.    She has a lidocaine patch, she does not think it is helping much  Consulted pain  Advised addition of Lyrica, Flexeril, tramadol    Currently, ophthalmology recommended cessation of Lyrica given concerns of it causing her blurry vision    Cardiology consulted    Acute on chronic heart failure with preserved ejection fraction (CMS/HCC)  Assessment & Plan  Unclear if this is completely due to heart.  Regardless she is volume overloaded    Checked x-ray this a.m., no significant changes    Cardiology on  Advised IV ethacrynic acid  -They had advised 2 doses yesterday, only got 1, as such redosed to this a.m.    Follow intake and output-suboptimal diuresis thus far    Interstitial lung disease (CMS/[facility])  Assessment & Plan  Does not appear to be in exacerbation for this  Continue supportive care    Wound of left leg  Assessment & Plan  Left 3rd toe dry gangrene  Pods on  Advised PVR, arterial US    Ultrasound ENRRIQUE with various deficiencies, but seems worse in the left PT at 0.62.  Arterial ultrasound with hemodynamically significant stenosis in the bilateral popliteal arteries and distal fem    Consult vascular tomorrow    Pain of both eyes  Assessment & Plan  Late 8/10 noted right eye pain,, swelling, blurry vision  Seen by ophthalmology  No concern for preseptal or orbital cellulitis    Advised carotid ultrasound to evaluate for possible amaurosis  Advised various drops, ointments for her eyes  Advised stopping Lyrica    Eye pain  Assessment & Plan  Pain and tearing    F/U Optho  CT orbits  Eye drops    Scleroderma (CMS/[facility])  Assessment & Plan  Again, chronic  Tocilizumab every 28 days    Pulmonary hypertension (CMS/[facility])  Assessment & Plan  Chronic, extensive history    Having her daughter bring in her macitentan  On sildenafil  Also offloading with ethacrynic acid    Essential (primary) hypertension  Assessment & Plan  Pressure stable here, continue home medications         VTE  Assessment: Padua    VTE Prophylaxis:  Current anticoagulants:    None      Code Status: Full Code      Estimated Discharge Date: 8/12/2024   Disposition Planning: F/U Carotid US. F/U pain management as optho advised stopping lyrica, patient reported drowsiness     Grant Watts DO  8/11/2024

## 2024-08-11 NOTE — ASSESSMENT & PLAN NOTE
Late 8/10 noted right eye pain,, swelling, blurry vision  Seen by ophthalmology  No concern for preseptal or orbital cellulitis  CT orbits: No evidence of acute process involving the orbits  Carotid US negative for HD significant stenosis  Advised various drops, ointments for her eyes--> per MAR has been declining eye drops as recommended by ortho  Advised stopping Lyrica  Both blurred vision and eye pain have resolved

## 2024-08-11 NOTE — NURSING NOTE
Pt reports feeling confused and delerious after taking lyrica. Oriented to place, self and year. Chest/abdominal pain unchanged, complaining of lightheadedness VSS. Pt also reporting blurry vision worse in R eye. Unable to fully open eye, some drainage noted. MD notified, at bedside to assess pt. CT completed.

## 2024-08-11 NOTE — PLAN OF CARE
Plan of Care Review  Plan of Care Reviewed With: patient  Progress: no change  Outcome Evaluation: Pt complaining of pain, lidocain patch and scheduled tylenol given, heating pad in place, prn throat lozenges given. Pt reports feeling weaker and more disoriented tonight. Call bell within reach, bed alarmed

## 2024-08-11 NOTE — SIGNIFICANT EVENT
Eval bedside for eye pain, blurry vision which started tonight. Poor historian. Afebrile and vss.  Reports discomfort of right eye and blurry vision b/l. Reports lightheadedness. On exam chronically ill appearing,  tight facies/skin with r max ttp, some periorbital edema b/l, R>L, some conjunctival discharge.no obvious induration or erythema. She keeps eyes closed ovrall. Right side does feel slightly warmer than left.  -ct orbits ordered to r/o orbital cellulitis, may be developing pre- septal cellulitis? Has severe allergy to pcn- reportedly sob in addition to many other meds therefor eabx choice will be limited  -ophtho consult   -warm compress  -refresh eye drops.   -pending resul may need abx

## 2024-08-11 NOTE — ASSESSMENT & PLAN NOTE
Pain and tearing  Ophtho consult appreciated   CT orbits: No evidence of acute process involving the orbits

## 2024-08-11 NOTE — ASSESSMENT & PLAN NOTE
she was found to have bilateral lower extremity wounds and bilateral second digit dry gangrene. Podiatry and Vascular were consulted. She underwent LLE angio, balloon angio of distal SFA, TP trunk, DCB angio of popliteal artery on 8/14. Now on Plavix 75mg daily. She has an allergy to aspirin. Continue local wound care.

## 2024-08-11 NOTE — PROGRESS NOTES
Per updates from DCP W/E rounds, pt is not stable for Dc home today. ENRIQUE Monday pending progress. Rn notes pt to be confused and delirious after taking lyrica overnight. C/o blurry vision, unable to fully open R eye, w/ drainage. CT orbits ordered. Optho c/s pending.     Followed by [facility]--once DC determined, fax AVS to [phone].     Has O2 w/ MM--will need her port tank brought in for DC home. Family to provide transport home at Dc. (Family to retrieve house key from pt to get her port tank for DC per  adm notes).     SW will continue to offer support and follow for needs until DC complete.

## 2024-08-11 NOTE — PROGRESS NOTES
64 yo with a history of pulmonary hypertension WHO Group I, NYHA/WHO FC III on Sildenafil 20 mg TID and Macitentan, systemic scleroderma, raynaud's, ILD, PE, CAD, lung mass currently being evaluated who was admitted following lung biopsy in the setting of pulmonary edema.     She is being diuresed with ethacrynic acid. She appeared nearly euvolemic yesterday but CXR still with some edema.     Pulmonary edema  Pulmonary hypertension  Scleroderma, Raynauds  ILD  CAD  HTN    Charted as 800 cc of urine out.  No weights.  No labs.       - Would start home dose of ethacrynic acid at 50 mg daily.   - Continue Sildenafil 20 mg TID   - Continue uninterrupted Macitentan 10 mg daily  - Continue remainder of cardiac medications including amlodipine 10 mg, atorvastatin.

## 2024-08-12 PROCEDURE — 63700000 HC SELF-ADMINISTRABLE DRUG: Performed by: NURSE PRACTITIONER

## 2024-08-12 PROCEDURE — 99232 SBSQ HOSP IP/OBS MODERATE 35: CPT | Performed by: INTERNAL MEDICINE

## 2024-08-12 PROCEDURE — 63700000 HC SELF-ADMINISTRABLE DRUG: Performed by: INTERNAL MEDICINE

## 2024-08-12 PROCEDURE — 21400000 HC ROOM AND CARE CCU/INTERMEDIATE

## 2024-08-12 PROCEDURE — 99221 1ST HOSP IP/OBS SF/LOW 40: CPT | Performed by: SURGERY

## 2024-08-12 PROCEDURE — 63700000 HC SELF-ADMINISTRABLE DRUG: Performed by: STUDENT IN AN ORGANIZED HEALTH CARE EDUCATION/TRAINING PROGRAM

## 2024-08-12 RX ORDER — CALCIUM CARBONATE 200(500)MG
200 TABLET,CHEWABLE ORAL ONCE
Status: COMPLETED | OUTPATIENT
Start: 2024-08-12 | End: 2024-08-13

## 2024-08-12 RX ORDER — IBUPROFEN/PSEUDOEPHEDRINE HCL 200MG-30MG
3 TABLET ORAL NIGHTLY PRN
Status: DISCONTINUED | OUTPATIENT
Start: 2024-08-12 | End: 2024-09-03 | Stop reason: HOSPADM

## 2024-08-12 RX ORDER — TRAMADOL HYDROCHLORIDE 50 MG/1
50 TABLET ORAL EVERY 6 HOURS PRN
Status: DISCONTINUED | OUTPATIENT
Start: 2024-08-12 | End: 2024-08-13

## 2024-08-12 RX ADMIN — ACETAMINOPHEN 975 MG: 325 TABLET ORAL at 02:31

## 2024-08-12 RX ADMIN — LIDOCAINE 4% 1 PATCH: 40 PATCH TOPICAL at 08:57

## 2024-08-12 RX ADMIN — GLYCERIN 1 DROP: .002; .002; .01 SOLUTION/ DROPS OPHTHALMIC at 13:01

## 2024-08-12 RX ADMIN — ACETAMINOPHEN 975 MG: 325 TABLET ORAL at 22:37

## 2024-08-12 RX ADMIN — WHITE PETROLATUM: 1.75 OINTMENT TOPICAL at 20:32

## 2024-08-12 RX ADMIN — GLYCERIN 1 DROP: .002; .002; .01 SOLUTION/ DROPS OPHTHALMIC at 20:33

## 2024-08-12 RX ADMIN — ETHACRYNIC ACID 50 MG: 25 TABLET ORAL at 09:02

## 2024-08-12 RX ADMIN — DEXTRAN 70, GLYCERIN, HYPROMELLOSE 1 DROP: 1; 2; 3 SOLUTION/ DROPS OPHTHALMIC at 20:32

## 2024-08-12 RX ADMIN — CYCLOBENZAPRINE HYDROCHLORIDE 5 MG: 5 TABLET, FILM COATED ORAL at 15:23

## 2024-08-12 RX ADMIN — SALINE NASAL SPRAY 2 SPRAY: 1.5 SOLUTION NASAL at 09:03

## 2024-08-12 RX ADMIN — SILDENAFIL 20 MG: 20 TABLET, FILM COATED ORAL at 09:01

## 2024-08-12 RX ADMIN — LEVOTHYROXINE SODIUM 100 MCG: 0.1 TABLET ORAL at 09:01

## 2024-08-12 RX ADMIN — AMLODIPINE BESYLATE 10 MG: 10 TABLET ORAL at 09:00

## 2024-08-12 RX ADMIN — BENZOCAINE 6 MG-MENTHOL 10 MG LOZENGES 1 LOZENGE: at 09:20

## 2024-08-12 RX ADMIN — SILDENAFIL 20 MG: 20 TABLET, FILM COATED ORAL at 13:12

## 2024-08-12 RX ADMIN — PANTOPRAZOLE SODIUM 20 MG: 20 TABLET, DELAYED RELEASE ORAL at 09:00

## 2024-08-12 RX ADMIN — DEXTRAN 70, GLYCERIN, HYPROMELLOSE 1 DROP: 1; 2; 3 SOLUTION/ DROPS OPHTHALMIC at 13:12

## 2024-08-12 RX ADMIN — ACETAMINOPHEN 975 MG: 325 TABLET ORAL at 15:20

## 2024-08-12 RX ADMIN — BENZONATATE 100 MG: 100 CAPSULE ORAL at 09:20

## 2024-08-12 RX ADMIN — DEXTRAN 70, GLYCERIN, HYPROMELLOSE 1 DROP: 1; 2; 3 SOLUTION/ DROPS OPHTHALMIC at 17:51

## 2024-08-12 RX ADMIN — SILDENAFIL 20 MG: 20 TABLET, FILM COATED ORAL at 20:28

## 2024-08-12 RX ADMIN — DEXTRAN 70, GLYCERIN, HYPROMELLOSE 1 DROP: 1; 2; 3 SOLUTION/ DROPS OPHTHALMIC at 09:01

## 2024-08-12 RX ADMIN — BENZOCAINE 6 MG-MENTHOL 10 MG LOZENGES 1 LOZENGE: at 22:43

## 2024-08-12 RX ADMIN — ACETAMINOPHEN 975 MG: 325 TABLET ORAL at 09:00

## 2024-08-12 RX ADMIN — WHITE PETROLATUM: 1.75 OINTMENT TOPICAL at 09:03

## 2024-08-12 RX ADMIN — GLYCERIN 1 DROP: .002; .002; .01 SOLUTION/ DROPS OPHTHALMIC at 08:00

## 2024-08-12 RX ADMIN — CYANOCOBALAMIN TAB 1000 MCG 1000 MCG: 1000 TAB at 09:01

## 2024-08-12 ASSESSMENT — COGNITIVE AND FUNCTIONAL STATUS - GENERAL
CLIMB 3 TO 5 STEPS WITH RAILING: 2 - A LOT
WALKING IN HOSPITAL ROOM: 3 - A LITTLE
WALKING IN HOSPITAL ROOM: 3 - A LITTLE
STANDING UP FROM CHAIR USING ARMS: 3 - A LITTLE
CLIMB 3 TO 5 STEPS WITH RAILING: 3 - A LITTLE
MOVING TO AND FROM BED TO CHAIR: 3 - A LITTLE
WALKING IN HOSPITAL ROOM: 3 - A LITTLE
MOVING TO AND FROM BED TO CHAIR: 3 - A LITTLE
STANDING UP FROM CHAIR USING ARMS: 3 - A LITTLE
MOVING TO AND FROM BED TO CHAIR: 3 - A LITTLE
STANDING UP FROM CHAIR USING ARMS: 3 - A LITTLE
CLIMB 3 TO 5 STEPS WITH RAILING: 3 - A LITTLE

## 2024-08-12 NOTE — CONSULTS
"Vascular Surgery Consult  [facility]  [id number]    Subjective     Arielle Felix is a 63 y.o. female  with PMHx scleroderma c/b pulmonary HTN/ILD (on Sildenafil and Macitentan), Raynaud's s/p partial amputation (L index finger ~10yrs ago), GERD, HFpEF (EF 65% per TTE 7/2024), h/o PE (diagnosed 3/2023 started on eliquis but discontinued 4/2023 secondary to hemoptysis), CAD with a progressively enlarging LLL pulmonary nodule. Patient underwent a scheduled EBUS (pulmonary Dr. Rodriguez) on 8/8/24 for further evaluation of her LLL nodule and was subsequently admitted after post-procedure CXR noted diffuse alveolar infiltrates in the L lung as well as a layering L pleural effusion concerning for volume overload. Her home diuretic (ethacrynic acid) has since been increased. She has also been evaluated for intractable bony chest pain as well as visual disturbances with cardiology, ophthalmology and pain management following in consultation.     With regards to her vascular history, patient reports known vascular issues to the lower extremities first diagnosed ~10yrs ago while admitted at [facility] for nonhealing wound on her L index finger. Was told she was a poor candidate for intervention \"because my veins were too small\". Since that time patient reports chronic bilat LE pain along with multiple lower extremity wounds, at times requiring antibiotics, but all slow healing. Several R foot wounds and L malleolar wound remain. Notes progressively worsening L 2nd toe pain and darkened discoloration over the past week. Pain is at rest, not worsened by movement or activity. Unable to find position of comfort. Some dulled sensation along L toes. Remote history of tobacco. Ambulates at home unassisted at baseline. No fever or chills since admission. No drainage, warmth or malodor to LE wounds. Podiatry has been following and providing local wound care. PVR US and arterial US performed and as follows:    PVR (8/10/24):  - R " "ankle ENRRIQUE: DP 0.74 / PT 0.88  - L ankle ENRRIQUE: DP 0.90 / PT 0.62     Bilat LE arterial US (8/10/24)   - L common fem artery velocity 246 cm/s  - Focal velocity elevation distal L fem artery peak systolic velocity 242cm/s and L pop peak systolic 353cm/s   - R pop artery peak systolic velocity 293cm/s  - \"Findings concerning for hemodynamically significant stenosis within bilat popliteal arteries and distal left femoral artery\"    Vascular surgery consulted for further evaluation and management of bilat popliteal and L femoral artery stenosis in the setting of her worsening L 2nd toe dry gangrene.     Medical History:   Past Medical History:   Diagnosis Date    At risk for falls     De Quervain's tenosynovitis     Essential (primary) hypertension     Follicular carcinoma of thyroid (CMS/HCC)     Gastro-esophageal reflux     Hand ulceration (CMS/HCC)     Hyperlipidemia, unspecified     Interstitial lung disease (CMS/HCC)     Leg ulcer (CMS/HCC)     Lung nodule     Lupus (CMS/HCC)     O2 dependent     On 2L    Osteomyelitis of hand (CMS/HCC)     Osteoporosis     Pulmonary hypertension (CMS/HCC)     PVD (peripheral vascular disease) (CMS/HCC)     Raynaud's disease     Severe    Reflux esophagitis     Scleroderma (CMS/HCC)     Screening mammogram for breast cancer 05/04/2021    BI-RADS category: 1 - Negative (care everywhere @ [place])    Vertigo     Wound, open, foot     bilateral,dressing change with medihoney and mupirocin ointment by homecare nurse       Surgical History:   Past Surgical History:   Procedure Laterality Date    CARDIAC CATHETERIZATION      COLONOSCOPY      HERNIA REPAIR      THYROIDECTOMY         Social History:   Social History     Social History Narrative    Not on file       Family History:   Family History   Problem Relation Age of Onset    Heart disease Biological Brother         stent    Breast cancer Niece     Cervical cancer Neg Hx     Uterine cancer Neg Hx     Colon cancer Neg Hx     Ovarian cancer " Neg Hx        Allergies: Aspirin, Ibuprofen, Iodine, Iodine and iodide containing products, Morphine, Penicillins, Sulfa (sulfonamide antibiotics), Clindamycin, Hydrochlorothiazide, Oxycodone, Sulfamethoxazole-trimethoprim, and Latex    Home Medications:    amLODIPine, Take 10 mg by mouth every morning.    atorvastatin, Take 1 tablet (40 mg total) by mouth daily.    benzonatate, Take 100 mg by mouth 3 (three) times a day as needed for cough.    biotin, Take 1,000 mcg by mouth daily.    cholecalciferol (vitamin D3), Take 1,000 Units by mouth daily.    collagenase, Apply 1 Application topically daily.    cyanocobalamin (vitamin B-12), Take 1,000 mcg by mouth daily.    levothyroxine, Take 100 mcg by mouth daily. BRAND ONLY    MEDIHONEY, HONEY, TOP, Apply topically daily.    mupirocin, Apply 1 application. topically daily. Behind ears    OPSUMIT, Take 1 tablet (10 mg total) by mouth daily.    sildenafiL (pulm.hypertension), Take 1 tablet (20 mg total) by mouth 3 (three) times a day.    docusate sodium, Take 100 mg by mouth daily as needed for constipation.    hydrocortisone, Apply to affected area 2 times daily    lidocaine, Apply 1 patch topically daily as needed (pain). Remove & discard patch within 12 hours or as directed by prescriber.    loperamide, Take 2 mg by mouth every 8 (eight) hours as needed for diarrhea.    meclizine, Take 25 mg by mouth daily as needed.    pantoprazole, Take 20 mg by mouth daily.    sodium chloride, Administer 2 sprays into each nostril daily.    ACTEMRA, Infuse 8 mg/kg into a venous catheter every 28 (twentyeight) days.    Current Medications:    acetaminophen, 975 mg, oral, q6h SPENCER    amLODIPine, 10 mg, oral, q AM    artificial tears, 1 drop, Both Eyes, QID    atorvastatin, 40 mg, oral, Daily (6p)    benzocaine-menthol, 1 lozenge, Mouth/Throat, q2h PRN    benzonatate, 100 mg, oral, 3x daily PRN    carboxymethylcellulose, 1 drop, Right Eye, 3x daily PRN    cyanocobalamin, 1,000 mcg,  "oral, Daily    cyclobenzaprine, 5 mg, oral, 3x daily PRN    glucose, 16-32 g of dextrose, oral, PRN **OR** dextrose, 15-30 g of dextrose, oral, PRN **OR** glucagon, 1 mg, intramuscular, PRN **OR** dextrose 50 % in water (D50), 25 mL, intravenous, PRN    docusate sodium, 100 mg, oral, Daily PRN    ethacrynic acid, 50 mg, oral, Daily    honey, , Topical, Daily PRN    levothyroxine, 100 mcg, oral, Daily    lidocaine, 1 patch, Topical, Daily    lidocaine, 1 patch, Topical, Daily    lidocaine, 1 patch, Topical, Daily    macitentan, 10 mg, oral, Daily    melatonin, 3 mg, oral, Nightly PRN    pantoprazole, 20 mg, oral, Daily    artificial tears, 1 drop, Both Eyes, q4h while awake    polyethylene glycol, 17 g, oral, BID    polyethylene glycol, 17 g, oral, 2x daily PRN    senna, 2 tablet, oral, BID    sildenafiL (pulm.hypertension), 20 mg, oral, TID    sodium chloride, 2 spray, Each Nostril, Daily    traMADoL, 50 mg, oral, q6h PRN    white petrolatum, , Topical, q4h PRN    white petrolatum, , Topical, BID    white petrolatum-mineral oil, , Both Eyes, Nightly    ethacrynic acid    Review of Systems  All other systems reviewed and negative except as noted in the HPI.    Objective     Physicial Exam  Visit Vitals  BP (!) 125/56 (BP Location: Left upper arm, Patient Position: Lying)   Pulse 85   Temp 37.1 °C (98.8 °F) (Oral)   Resp 18   Ht 1.676 m (5' 6\")   Wt 50.8 kg (112 lb)   LMP  (LMP Unknown)   SpO2 98%   BMI 18.08 kg/m²       General appearance: alert, appears stated age, and cooperative  Head: normocephalic, without obvious abnormality, atraumatic  Neck: supple, symmetrical, trachea midline  Lungs:  normal respiratory effort, 98% on 3L NC  Chest wall:  + ttp L chest wall. Lidocaine patch in place  Heart:  regular rate, normotensive  Abdomen:  soft, nontender. Heating pad in place  Extremities: Bilat hand deformities including partial L index amputation, multiple fixed contractures bilat fingers. Bilat feet cool, equal and " bilat. Poor cap refill.   Pulses:     RLE: strong palpable femoral pulse, dopplerable PT/DP. Unable to palp popliteal    LLE: strong palpable femoral pulse, dopplerable PT/DP (weaker than R), Unable to palp popliteal  Skin: R malleolar wound, L medial malleolar wound. Both dry without signs of infection. R second toe w/ eschar.  L second toe w/ dry gangrene (see media tab)  Neurologic: AAO x 3. Sensation intact to light touch bilat feet (dullness along L toes), hyperparesthesia L 2nd toe. +Toe wiggle bilat, + ankle dorsi/plantar flexion bilat    Labs  Labs reviewed  Results from last 7 days   Lab Units 08/09/24  1938   WBC K/uL 9.38   HEMOGLOBIN g/dL 10.7*   HEMATOCRIT % 36.7   PLATELETS K/uL 285     Results from last 7 days   Lab Units 08/09/24  1938   SODIUM mEQ/L 140   POTASSIUM mEQ/L 3.6   CHLORIDE mEQ/L 106   CO2 mEQ/L 24   BUN mg/dL 18   CREATININE mg/dL 1.3*   CALCIUM mg/dL 9.5   GLUCOSE mg/dL 97     No wound cultures to-date  LLL BAL - negative fungal, negative AFB, neg organism  Sputum culture [id number]        Imaging  Imaging reviewed    Ultrasound carotid bilateral  Result Date: 8/11/2024  IMPRESSION: 1. No evidence for hemodynamically significant stenosis within the bilateral carotid arteries. 2. Bilateral antegrade flow within the vertebral arteries.      Ultrasound PVR lower extremity  Result Date: 8/10/2024  IMPRESSION: Ankle-Brachial Indices as above.    Ultrasound arterial leg bilateral    Result Date: 8/10/2024  IMPRESSION: Findings concerning for hemodynamically significant stenosis within the bilateral popliteal arteries and distal left femoral artery. There are also findings which can be seen with inflow stenosis at the level left common femoral artery. CTA of the abdomen and pelvis with lower extremity runoff could be performed for further evaluation if clinically indicated.    X-RAY CHEST 1 VIEW    Result Date: 8/10/2024  IMPRESSION: Ongoing interstitial thickening suggestive of pulmonary edema,  similar to prior.    CT ABDOMEN PELVIS WITHOUT IV CONTRAST    Result Date: 8/9/2024  IMPRESSION: Bibasilar airspace opacity/chronic areas of fissural thickening are seen in the lower lobes.  There is persistent left pleural effusion appreciated. Cardiomegaly again noted with small moderate pericardial effusion.. Moderate colonic stool burden especially through to the transverse colon.       Assessment     63 y.o. female  with PMHx scleroderma c/b pulmonary HTN/ILD (on Sildenafil and Macitentan), Raynaud's s/p partial amputation (L index finger ~10yrs ago), GERD, HFpEF (EF 65% per TTE 7/2024), h/o PE (diagnosed 3/2023 started on eliquis but discontinued 4/2023 secondary to hemoptysis), CAD with a progressively enlarging LLL pulmonary nodule admitted 8/8/24 after found to have acute on chronic respiratory failure and pleural effusion s/p EBUS of LLL nodule. History of chronic bilat LE wounds with acute and progressively worsening L 2nd toe dry gangrene. Patient found to have abnormal ABIs along with significant stenosis of bilat popliteal and L distal femoral artery. Vascular surgery consulted for further management and evaluation.         Plan   - no immediate surgical intervention  - will need further evaluation of arterial stenosis with bilat LE angiogram. Tentatively plan for OR 8/14  - given history of contrast allergy, patient will need to be premedicated with 13hr contrast allergy preparation. Will follow up OR timing once made available to correctly time administration of medications  - f/u podiatry, continue local wound care  - f/u cardiology  - remainder of care per primary team  - final plans pending attending attestation. Case discussed with vascular attending RAS Curtis  Vascular Surgery - [facility]   Please page [facility] [phone] with questions/concerns

## 2024-08-12 NOTE — PROGRESS NOTES
Hospital Medicine     Daily Progress Note       SUBJECTIVE   Interval History: Patient reports her chest pain has not improved at all.  She reports a deep bone pain. She reports she has been OOB, walks to the bathroom.     OBJECTIVE      Vital signs in last 24 hours:  Temp:  [36.3 °C (97.3 °F)-37.1 °C (98.8 °F)] 37.1 °C (98.8 °F)  Heart Rate:  [72-96] 85  Resp:  [18] 18  BP: (111-135)/(56-71) 125/56    Intake/Output Summary (Last 24 hours) at 8/12/2024 1358  Last data filed at 8/12/2024 0200  Gross per 24 hour   Intake 318 ml   Output --   Net 318 ml       PHYSICAL EXAMINATION      Physical Exam  Vitals and nursing note reviewed.   Constitutional:       General: She is not in acute distress.     Comments: Chronically ill-appearing, underweight   HENT:      Head: Normocephalic.   Eyes:      Conjunctiva/sclera: Conjunctivae normal.   Cardiovascular:      Rate and Rhythm: Normal rate and regular rhythm.   Pulmonary:      Effort: Pulmonary effort is normal. No respiratory distress.      Breath sounds: Normal breath sounds.   Abdominal:      General: Bowel sounds are normal. There is no distension.      Palpations: Abdomen is soft.      Tenderness: There is no abdominal tenderness.   Musculoskeletal:      Cervical back: Neck supple.      Right lower leg: No edema.      Left lower leg: No edema.   Skin:     General: Skin is warm and dry.   Neurological:      Mental Status: She is alert and oriented to person, place, and time.   Psychiatric:         Behavior: Behavior normal.            LINES, CATHETERS, DRAINS, AIRWAYS, AND WOUNDS   Lines, Drains, and Airways:  Wounds (agree with documentation and present on admission):  Peripheral IV (Adult) 08/08/24 Anterior;Left;Proximal Forearm (Active)   Number of days: 4       Wound Anterior;Right Toe (2nd) (Active)   Number of days: 4       Wound Anterior;Left Toe (Great) (Active)   Number of days: 4       Wound Anterior;Left Toe (2nd) (Active)   Number of days: 4          Comments:    LABS / IMAGING / TELE      Labs  Results from last 7 days   Lab Units 08/09/24  1938   SODIUM mEQ/L 140   POTASSIUM mEQ/L 3.6   CHLORIDE mEQ/L 106   CO2 mEQ/L 24   BUN mg/dL 18   CREATININE mg/dL 1.3*   GLUCOSE mg/dL 97   CALCIUM mg/dL 9.5     Results from last 7 days   Lab Units 08/09/24  1938   WBC K/uL 9.38   HEMOGLOBIN g/dL 10.7*   HEMATOCRIT % 36.7   PLATELETS K/uL 285           Imaging  Ultrasound carotid bilateral    Result Date: 8/11/2024  Narrative: CLINICAL HISTORY: Amaurosis. Pain in both eyes. H57.13: Ocular pain, bilateral COMMENT: Ultrasound examination of the carotid arteries is performed utilizing gray scale, color, and pulsed Doppler sonography. Right: There is mild to moderate plaque noted within the right carotid vessels. The peak systolic velocity within the right internal carotid artery is 84.0 cm/sec with end-diastolic velocity of 27.2 cm/sec and ICA/CCA ratio of 1.3. There is antegrade flow within the right vertebral artery. Left: There is mild to moderate plaque within the left carotid vessels. The peak systolic velocity within the left internal carotid artery is 116.6 cm/sec with end-diastolic velocity of 34.2 cm/sec and ICA/CCA ratio of 1.2. There is antegrade flow within the left vertebral artery. CAROTID DUPLEX CRITERIA:  Measurement of carotid stenosis is based on velocity parameters that correlate the residual internal carotid diameter with [employer] Symptomatic Carotid Endarterectomy Trial (NASCET)-based stenosis levels.     Impression: IMPRESSION: 1. No evidence for hemodynamically significant stenosis within the bilateral carotid arteries. 2. Bilateral antegrade flow within the vertebral arteries.    CT ORBITS AND SELLA WITHOUT IV CONTRAST    Result Date: 8/11/2024  Narrative: CLINICAL HISTORY:  Ocular pain COMMENT: Comparison: CT head 7/24/2024, MRI Orbit 7/9/2009 Technique: CT examination of the orbits was performed without intravenous contrast.  Sagittal  and coronal reconstructions were obtained. CT DOSE:  One or more dose reduction techniques (e.g. automated exposure control, adjustment of the mA and/or kV according to patient size, use of iterative reconstruction technique) was utilized for this examination. Findings: Preseptal soft tissues: No soft tissue swelling. Lacrimal glands: Symmetric. Normal in size. Globes: No gross proptosis. Normal in shape. Lens are in normal location. Extraocular muscles: Symmetric. Normal in size. Retrobulbar fat: No mass or fatty infiltration. Optic nerve - sheath complexes: Symmetric. Normal in size. Orbital walls: No fracture is seen. Visualized paranasal sinuses: Mild-moderate mucosal thickening in the paranasal sinuses with frothy secretions in the right frontal sinus and a mucous retention cysts in the right maxillary sinus. Mild opacification of the mastoid air cells. Visualized brain: Unremarkable.     Impression: IMPRESSION: No evidence of acute process involving the orbits. Preliminary report was provided by overnight radiologist, Dr. Shelley Carroll, at 4:48 AM on 8/11/2024.    Ultrasound PVR lower extremity    Result Date: 8/10/2024  Narrative: CLINICAL HISTORY: R07.9: Chest pain, unspecified   . COMMENT: Noninvasive arterial examination of the lower extremities is performed. Ankle-Brachial Index (ENRRIQUE) measurements were obtained as below: Right ankle ENRRIQUE (DP): 0.74. Right ankle ENRRIQUE (PT): 0.88. Left ankle ENRRIQUE (DP): 0.90. Left ankle ENRRIQUE (PT): 0.62. Pulsed-volume doppler waveforms: Multilevel pulsed volume waveforms were obtained at the level of the thighs, calves and ankles. There is dampening of the pulsed volume waveform recordings at the level of the ankles, mild to moderate on the left and mild on the right There is no significant dampening of the pulsed volume waveform recordings at the thighs or calves.     Impression: IMPRESSION: Ankle-Brachial Indices as above.    Ultrasound arterial leg bilateral    Result Date:  8/10/2024  Narrative: CLINICAL HISTORY: I25.10: Atherosclerotic heart disease of native coronary artery without angina pectoris   . COMMENT: Grayscale, color doppler and pulsed doppler sonographic evaluation of the lower extremity arterial system was performed from the level of the common femoral artery to the popliteal artery bilaterally. There is scattered atherosclerotic plaques noted bilaterally. There is focal velocity elevation within the right popliteal artery with a peak systolic velocity measuring up to 293.7 cm/s with flow turbulence identified in this region in keeping with hemodynamically significant stenosis. There is velocity elevation within the left common femoral artery measuring up to 246.6 cm/s which can be seen with inflow stenosis. Focal velocity elevation within the distal left femoral artery with peak systolic velocity measuring up to 242.8 cm/s as well as within the left popliteal artery with peak systolic velocities measuring up to 353.5 cm/s, are in keeping with hemodynamically significant stenosis. There is no hemodynamically significant stenosis within the common femoral arteries or femoral arteries otherwise noted bilaterally.     Impression: IMPRESSION: Findings concerning for hemodynamically significant stenosis within the bilateral popliteal arteries and distal left femoral artery. There are also findings which can be seen with inflow stenosis at the level left common femoral artery. CTA of the abdomen and pelvis with lower extremity runoff could be performed for further evaluation if clinically indicated.      ECG/Telemetry  Reviewed    ASSESSMENT AND PLAN      Wound of left leg  Assessment & Plan  bilateral lower extremity wounds and bilateral second digit dry gangrene  Podiatry consult appreciated  Advised PVR, arterial US; Ultrasound ENRRIQUE with various deficiencies, but seems worse in the left PT at 0.62.  Arterial ultrasound with hemodynamically significant stenosis in the  "bilateral popliteal arteries and distal fem  Vascular surgery consulted, f/u recs  Local wound care as per pods    Chest pain  Assessment & Plan  Chest pain has been ongoing  Exam and history suggest MSK  Pain control is difficult as patient has various allergies, including morphine, oxycodone, NSAIDs.  She has a lidocaine patch, she does not think it is helping much  Consulted pain management; Advised addition of Lyrica, Flexeril, tramadol  Currently, ophthalmology recommended cessation of Lyrica given concerns of it causing her blurry vision  Pt reports tramadol caused dizziness and then she was \"unable to see\". she also does not want to try the flexeril, she said she is fearful of taking it bc of all her allergies     Interstitial lung disease (CMS/[facility])  Assessment & Plan  Does not appear to be in exacerbation for this  Continue supportive care    Pain of both eyes  Assessment & Plan  Late 8/10 noted right eye pain,, swelling, blurry vision  Seen by ophthalmology  No concern for preseptal or orbital cellulitis  CT orbits: No evidence of acute process involving the orbits  Carotid US negative for HD significant stenosis  Advised various drops, ointments for her eyes  Advised stopping Lyrica    Acute on chronic heart failure with preserved ejection fraction (CMS/[facility])  Assessment & Plan  EF 65%  Known to Cardiology Dr. Arvizu, in-house consult placed  S/p IV ethacrynic acid, now back on PO  Continue pHTN meds; opsumit, revatio  CHF order set, daily weights, salt/fluid restriction, strict Is&Os      Scleroderma (CMS/[facility])  Assessment & Plan  Again, chronic  Tocilizumab every 28 days    Pulmonary hypertension (CMS/[facility])  Assessment & Plan  Chronic, extensive history    Having her daughter bring in her macitentan  On sildenafil  Also offloading with ethacrynic acid    Essential (primary) hypertension  Assessment & Plan  Pressure stable here, continue home medications         VTE Assessment: Padua    VTE Prophylaxis:  " Current anticoagulants:    None      Code Status: Full Code      Estimated Discharge Date: 8/13/2024   Disposition Planning: pending clinical course     KARYNA Bragg  8/12/2024

## 2024-08-12 NOTE — PLAN OF CARE
Plan of Care Review  Plan of Care Reviewed With: patient  Progress: improving  Outcome Evaluation: Pt AAOx4, complains of chest bone pain, reports some improvement in abdominal pain. Heating pad in place, scheduled tylenol given. Ambulated to bathroom assist x1. Call bell within reach, bed alarmed

## 2024-08-12 NOTE — CONSULTS
Nutrition Assessment    Recommendations  1: Recommend regular diet to promote PO intake w/ low BMI. FR per team     2: Ensure Plus BID (strawberry/chocolate)            Clinical Course: Patient is a 63 y.o. female who was admitted on 8/8/2024 with a diagnosis of Lung nodule [R91.1]  Thoracic lymphadenopathy [R59.0]  Pulmonary edema, acute (CMS/HCC) [J81.0].     Past Medical History:   Diagnosis Date    At risk for falls     De Quervain's tenosynovitis     Essential (primary) hypertension     Follicular carcinoma of thyroid (CMS/HCC)     Gastro-esophageal reflux     Hand ulceration (CMS/HCC)     Hyperlipidemia, unspecified     Interstitial lung disease (CMS/HCC)     Leg ulcer (CMS/HCC)     Lung nodule     Lupus (CMS/HCC)     O2 dependent     On 2L    Osteomyelitis of hand (CMS/HCC)     Osteoporosis     Pulmonary hypertension (CMS/HCC)     PVD (peripheral vascular disease) (CMS/HCC)     Raynaud's disease     Severe    Reflux esophagitis     Scleroderma (CMS/HCC)     Screening mammogram for breast cancer 05/04/2021    BI-RADS category: 1 - Negative (care everywhere @ [facility])    Vertigo     Wound, open, foot     bilateral,dressing change with medihoney and mupirocin ointment by homecare nurse     Past Surgical History:   Procedure Laterality Date    CARDIAC CATHETERIZATION      COLONOSCOPY      HERNIA REPAIR      THYROIDECTOMY         Reason for Assessment  Reason For Assessment: physician consult     [facility] Nutrition Screen Tool  Has patient lost weight without trying?: 0-->No  Has patient been eating poorly due to decreased appetite?: 0-->No  MST Nutrition Screen Score: 0     Nutrition/Diet History  Intake (%): 75%     Physical Findings  Last Bowel Movement: 08/12/24  Skin: other (see comments) (b/l toe wounds per LDA)     RETS18 Physical Appearance  Last Bowel Movement: 08/12/24  Skin: other (see comments) (b/l toe wounds per LDA)     Nutrition Order  Nutrition Order: meets nutritional requirements  Nutrition Order  "Comments: 2gm Na, 2000 mL  Current TF/TPN Regimen: No     Anthropometrics  Height: 167.6 cm (5' 6\")           Current Weight  Weight Method: Bed scale  Weight: 50.8 kg (112 lb)     Ideal Body Weight (IBW)  Ideal Body Weight (IBW) (kg): 59.58  % Ideal Body Weight: 85.27            Body Mass Index (BMI)  BMI (Calculated): 18.1                       Labs/Procedures/Meds  Lab Results Reviewed: reviewed, pertinent     Lab Results   Component Value Date    GLUCOSE 97 08/09/2024    CALCIUM 9.5 08/09/2024     08/09/2024    K 3.6 08/09/2024    CO2 24 08/09/2024     08/09/2024    BUN 18 08/09/2024    CREATININE 1.3 (H) 08/09/2024             Medications  Pertinent Medications Reviewed: reviewed, pertinent   Scheduled Meds:   acetaminophen  975 mg oral q6h SPENCER    amLODIPine  10 mg oral q AM    artificial tears  1 drop Both Eyes QID    atorvastatin  40 mg oral Daily (6p)    cyanocobalamin  1,000 mcg oral Daily    ethacrynic acid  50 mg oral Daily    levothyroxine  100 mcg oral Daily    lidocaine  1 patch Topical Daily    lidocaine  1 patch Topical Daily    lidocaine  1 patch Topical Daily    macitentan  10 mg oral Daily    pantoprazole  20 mg oral Daily    artificial tears  1 drop Both Eyes q4h while awake    polyethylene glycol  17 g oral BID    senna  2 tablet oral BID    sildenafiL (pulm.hypertension)  20 mg oral TID    sodium chloride  2 spray Each Nostril Daily    white petrolatum   Topical BID    white petrolatum-mineral oil   Both Eyes Nightly       Estimated/Assessed Needs  Additional Documentation: Calorie Requirements (Group), Protein Requirements (Group)     Calorie Requirements  Estimated kCal Needs: Actual Body Weight  Estimated Calorie Need Method: kcal/kg  Calorie/kg Recommended: 30-35  Calorie Recommendations: 2237-3673 kcal                   Protein Requirements  Recommended Dosing Weight (Estimated Protein Needs): Actual Body Weight  Est Protein Requirement Amount (gms/kg): 1.5-->1.5 gm " protein  Protein Recommendations: 75 g                         Fluid requirements: 1300 mL (25 ml/kg)     Dosing weight  50.8 kg (CBW)                                                           NFPE: generalized wasting. Facial edema     Clinical comments:    Nutrition consult acknowledged, pt seen for underweight status. Pt seen at bedside, visitor present. Pt notes she has aides at home who prepare meals for her. Notes she had lost weight earlier this year and reports she was down to 89#. Noting fluctuations in weights per chart. Weights generally ranging # in March of this year, with more recent weights trending up. Somewhat difficult to assess weight trends with hx of edema- unclear what portion of weight change is attributable to true weight gain vs. fluid. Had been supplementing intakes w/ ONS PTA in an effort to gain weight- agreeable to continue in house. Denies N/V at present. Will continue to monitor.     Goals: PO intake >70% meals through f/u   Monitor: PO intake, diet order, lab values, weight trends, skin integrity, plan of care     Recommendations: See above     PES  Statement: PES Statement  Nutrition Diagnosis: Inadequate Energy Intake  Related To:: Decreased ability to consume sufficient energy  As Evidenced By:: BMI 18.08  Nutritional Needs Met?: No                                   Date: 08/12/24  Signature: BECCA Quinn

## 2024-08-12 NOTE — PROGRESS NOTES
Subjective:   Pt seen at bedside for bilateral lower extremity wounds with dry, stable gangrene of bilateral second digits. Pt denies any significant pedal pain. Dressing clean, dry, and intact. Denies any N/V/F/C/CP/SOB.        Past Medical/Surgical history, Allergies, Meds reviewed in detail as charted  FH/SH reviewed in detail as charted    ROS:  Head and Neck: No complaints  Chest : No complaints  Abdomen: No Complaints  Constitutional: Unremarkable     Vitals: I have reviewed the patient's pertinent vital signs.     Radiology: Reviewed     Labs:   CBC Results         08/09/24 07/25/24 07/20/24 1938 1420 0414    WBC 9.38 10.29 7.27    RBC 4.49 4.64 3.98    HGB 10.7 11.2 9.8    HCT 36.7 37.3 32.0    MCV 81.7 80.4 80.4    MCH 23.8 24.1 24.6    MCHC 29.2 30.0 30.6     331 206          BMP Results         08/09/24 07/25/24 07/20/24 1938 1420 0415     139 142    K 3.6 4.3 4.2    Cl 106 103 107    CO2 24 27 25    Glucose 97 86 85    BUN 18 17 17    Creatinine 1.3 1.1 1.2    Calcium 9.5 10.6 9.3    Anion Gap 10 9 10    EGFR 46.3 56.6 51.0           Comment for K at 1420 on 07/25/24: Results obtained on plasma. Plasma Potassium values may be up to 0.4 mEQ/L less than serum values. The differences may be greater for patients with high platelet or white cell counts.    Comment for EGFR at 1938 on 08/09/24: Calculation based on the Chronic Kidney Disease Epidemiology Collaboration (CKD-EPI) equation refit without adjustment for race.    Comment for EGFR at 1420 on 07/25/24: Calculation based on the Chronic Kidney Disease Epidemiology Collaboration (CKD-EPI) equation refit without adjustment for race.    Comment for EGFR at 0415 on 07/20/24: Calculation based on the Chronic Kidney Disease Epidemiology Collaboration (CKD-EPI) equation refit without adjustment for race.            LE Objective:   -Vascular: DP pulses are faintly palpable B/L. PT pulses are faintly palpable B/L.   B/L edema to the foot,  ankle and leg. Capillary refill time is less than 3 seconds B/L.   Skin temperature is warm to slightly cool from leg to toes B/L.                       -Ortho: + active df/pf of all digits B/L. Ankle ROM normal B/L. MMT 5/5 in all planes tested B/L.  Multiple digital contractures noted B/L  -Neuro: Light touch and protective sensation is intact B/L    -Derm:   RLE:   -Well adhered eschar noted to the distal aspect of the second digit.  No drainage, malodor, deep probing, fluctuance, crepitus noted  -Small fibrotic wound noted to medial malleolus which is epitheliazed.  Wound bed is dry.  No malodor, fluctuance, crepitus, drainage, deep probing noted  LLE:  -Full-thickness fibrotic wound noted to medial aspect of hallux measuring approximately 1.2 cm x 1 cm x 0.2 cm.  No drainage, malodor, deep probing, fluctuance, crepitus noted.  Wound edges intact.  No probe to bone appreciated.  -Small fibrotic wound noted to medial malleolus measuring approximately 1 cm x 1 cm x 0.1 cm.  Wound bed is dry.  No malodor, fluctuance, crepitus, drainage, deep probing noted  -Early gangrenous changes noted to distal aspect of left second toe measuring approximately 0.5 cm x 0.5 cm.                 Scabbing evident over the bilateral medial mounds.  No drainage, malodor, deep probing, fluctuance, crepitus noted.    See media tab for clinical pictures.        Assessment:  63-year-old female with bilateral lower extremity wounds and bilateral second digit dry gangrene    Plan:     -Patient was seen evaluated and treated with all questions and concerns addressed  -Bilateral second digit wounds were dressed with Mepilex.  -Left medial hallux wound was dressed with Medihoney and Mepilex.  -Bilateral medial malleolus had Mepilex padding applied.  -Labs reviewed  -ENRRIQUE/PVIs ordered for further assessment of non-palpable pulses.    Right ankle ENRRIQUE (DP): 0.74.  Right ankle ENRRIQUE (PT): 0.88.     Left ankle ENRRIQUE (DP): 0.90.  Left ankle ENRRIQUE (PT):  0.62.  -Patient may be weightbearing as tolerated to bilateral lower extremity  -dvt ppx and pain management per primary team   -Please contact on call podiatry resident with any questions or concerns.     Tod Ricks IV PGY-1  #[id number]

## 2024-08-12 NOTE — PLAN OF CARE
Problem: Adult Inpatient Plan of Care  Goal: Patient-Specific Goal (Individualized)  Outcome: Progressing  Flowsheets (Taken 8/12/2024 1129)  Patient/Family-Specific Goals (Include Timeframe): dereased pain this shift  Individualized Care Needs: pain management, assist w ADLs, assist OOB, fluid restriction  Anxieties, Fears or Concerns: concerned about pain   Plan of Care Review  Plan of Care Reviewed With: patient  Progress: no change  Outcome Evaluation: pt AAOX4, continues to c/o R eye, L chest and L side pain. pt receiving around the clock Tylenol dosing but refusing prn pain meds. PT Sr on monitor and ST while ambulating. pox stable on 2 liters and heating pad in place and lidoderm patches in use with some relief. facial edema still present and pt still c/o blurry vision b/l and team aware. pt aware of plan of care. will cont to monitor

## 2024-08-12 NOTE — PLAN OF CARE
Care Coordination Discharge Plan Note     Discharge Needs Assessment  Concerns to be Addressed: discharge planning  Current Discharge Risk: chronically ill, physical impairment    Anticipated Discharge Plan  Anticipated Discharge Disposition: home with assistance, home with home health  Type of Home Care Services: home PT, nursing, home OT    Patient Choice  Offered/Gave Vendor List: yes  Patient's Choice of Community Agency(s): [facility]    Patient and/or patient guardian/advocate was made aware of their right to choose a provider. A list of eligible providers was presented and reviewed with the patient and/or patient guardian/advocate in written and/or verbal form. The list delineates providers in the patient’s desired geographic area who are participating in the Medicare program and/or providers contracted with the patient’s primary insurance. The Medicare list and quality ratings were obtained from the Medicare.gov [medicare.gov] website.    ---------------------------------------------------------------------------------------------------------------------    Interdisciplinary Discharge Plan Review:  Participants:occupational therapy, , social work/services, pharmacy, physical therapy, nursing, physician    Concerns Comments: Tentative D/C home with [facility]. Clinical information sent to [facility] via [employer].    Discharge Plan:   Disposition/Destination: Home Health Care - Other / Home    Discharge Transportation:  Is Out of Hospital DNR needed at Discharge: no  Does patient need discharge transport? No

## 2024-08-12 NOTE — PROGRESS NOTES
Cardiology/pulmonary hypertension service progress note    Interval History:  She tells me she does not feel well as the left side of her chest continues to be tender.  She says her breathing is fair but staying in bed.  She does not know her dry weight as her weight has fluctuated dramatically in recent years from as low as 86 pounds and as high as 160 pounds.    Background per Dr. Arvizu:    Ms. Felix is a 63 y.o. female with a past medical history of Pulmonary HTN, HTN, Raynaud's Disease, Cutaneous Systemic Scleroderma (dx 1998), ILD, PE (3/2023). She is a patient of Dr. Nguyễn. She was previously followed by Dr. Jos Valdez at [facility]. Echocardiogram from 4/21/2022 showed LVEF: 55-60%, mild aortic valve thickening, pulmonary artery systolic pressure: 52 mmHg and trivial pericardial effusion. LHC and RHC (separate occasions) over the last 5-10 years which showed normal hemodynamics and normal coronaries. She had a RHC 9/02/2020 showing top-normal right sided filling pressures with mild pulmonary hypertension, top-normal PVR and normal pulmonary capillary wedge pressure. The cardiac index was normal, seen below.     On 6/27/2022, she was in [facility] ER for chest pain. EKG: NSR without ischemic changes. hsTrop: 12->16, d-dimer: 0.56, CXR showed cardiomegaly and diffuse interstitial prominence.     Dr. Nguyễn ordered echocardiogram, which was completed on 8/22/2022 and showed estimated RVSP = 50-55 mmHg, normal-sized LV with normal LV systolic function. Estimated EF 55- 60%. Wall motion grossly normal, mild concentric left ventricular hypertrophy, grade I LV diastolic dysfunction, probably normal RV size and function.    At her initial visit on 10/11/2022, she reported that she felt very short of breath with minimal activity most of the time. She reports that this started about 1 year prior and had progressively gotten worse. She described PND and bendopnea. She reported that she experienced ankle swelling,  worsened over the past year and usually worse towards the end of the night. She also reported some swelling in her abdomen. She reported daily palpations. I recommended a RHC which was done 11/28/2022 and showed: Normal right sided filling pressures. Mildly elevated left sided filling pressures. Precapillary pulmonary hypertension with mean PA 36 mmHg.    She was hospitalized on 3/19/2023 at [facility] for PE diagnosed on V/Q scan. She reports that she had presented with left arm pain and heaviness. She states that she was started on Apixaban. One week after her last office visit (4/6/2023), she presented to [facility] with chest pain, epistaxis and hemoptysis. Echocardiogram showed: left ventricular cavity size normal with mild left ventricular hypertrophy and preserved systolic function. Estimated EF 65%. Chest CTA showed no evidence of PE; Apixaban was discontinued.     She had a repeat echocardiogram (6/2023) which showed: Normal left ventricular cavity size and mild concentric left ventricular hypertrophy. Normal systolic function. EF: 60%. Normal right ventricular structure and function. Mild tricuspid valve regurgitation with estimated RVSP: 55 mmHg. Compared to previous study from 4/14/2023, estimated right ventricular systolic pressure were higher.    She underwent V/Q scan in September 2023, which showed intermediate-to-high probability of pulmonary embolic disease, as a result she completed CTA Chest PE which showed: no evidence for filling defect or vessel cutoff to suggest pulmonary embolism. Enlargement of the pulmonary arteries compatible with pulmonary arterial hypertension was seen.    She reports she started Macitentan around August or September 2023. She stated that she had not been checking her SpO2. She reports she did not notice any difference since starting it. She reported she had been feeling more shortness of breath at times with ADLs.     She presented to [facility] on 3/3/2024 with chest  pressure, palpitations and nausea. She was noted to have hypoxia and tachypnea at presentation. She was found to have moderate-to-large pericardial effusion without tamponade. She was started on Colchicine 0.6 mg BID on 3/4/2024 and when she did not improve symptomatically, Prednisone 20 mg daily was added on 3/5/2024. On 3/7/2024, she developed multiple episodes of diarrhea and Colchicine dose was decreased to 0.3 mg. Coronary CTA, done as part of the ischemic workup, showed moderate 2 vessel CAD. She was recommended to begin Atorvastatin 20 mg daily and Clopidogrel 75 mg daily. She initially refused those two medications, but eventually agreed to take Atorvastatin.      She was discharged on Colchicine 0.3 mg for at least 3 months and Prednisone 20 mg for a total two week course until 3/19/2024, then decrease the dose to 10 mg daily for 2 weeks until 4/2/2024, then decrease dose to 5 mg daily for 2 weeks until 4/16/2024. She reports she tapered off Prednisone as instructed and has continued to take Colchicine as prescribed. She completed an echocardiogram (4/10/2024) which showed: Small-to-moderate sized, circumferential pericardial effusion. No echocardiographic evidence for cardiac tamponade. Compared to previous study from 3/4/2024, no significant change. Pericardial effusion size was similar, estimated right atrial pressure lower.      She presented to [facility] ([place]16/2024) with increased pleuritic chest pain. On presentation to the ER, she was noted to be hypertensive and tachycardic. Labs showed mildly elevated troponin, elevated BNP and leukocytosis. CT angiography of the chest did not show evidence of PE. It was read as large pericardial effusion, evidence of emphysema, evidence of pulmonary hypertension, 16 mm left lobe lung mass.       She presented again to [facility] on 5/21/2024 at the request of her Rheumatologist. During her visit there, she was noted to be tachycardic and with some shortness of breath. Her  "chest pain was improved from her prior admission. On arrival, O2 saturations were normal. ECG showed: Sinus Tachycardia (HR: 125 bpm). BNP >300, hsTrop 32 ->24. POCUS in the emergency department showed evidence of pericardial effusion with no tamponade physiology. CTA Chest did not show evidence of pulmonary embolism. There were bilateral changes radiographically concerning for volume overload. She received Methylpredinsolone 125 mg in ED. Of note, she was on Prednisone 10 mg daily at home. She was given IV diuresis. She was discharged on Furosemide 20 mg PO PRN daily for fluid weight gain / swelling.     She had brief hospitalization and was discharged on 7/23 for chest pain. She presented to the ED on 7/25/2024 for worsening of her chronic chest pain and 8/5/2024 for facial swelling and chronic chest pain.      She underwent bronchoscopic lung biopsy yesterday, 8/8/2024, with Dr. Niles Rodriguez. Pathology showed atypical glandular cells which may be consistent with adenocarcinoma in situ.     Post-procedure CXR showed increase pulmonary vascular congestion. She was admitted for optimization.       ---    Rheumatology: Dr. Trevizo  Pulmonology: Dr. Wasserman    Past Medical / Surgical History:  Pulmonary HTN, HTN, Raynaud's Disease, Cutaneous Systemic Scleroderma (dx 1998), ILD, PE (3/2023), Follicular Carcinoma of Thyroid, Tenosynovitis, de Quervain, h/o Hernia Repair, h/o Appendicitis, h/o Digit Amputation, H/o Total Thyroidectomy (Dr. Pacheco at [facility]"; 4/2013)    Family & Social History: She is , she has two children. She has worked as an .     Tobacco: former 2005  EtOH: never   Illicits: never     Her brother has CAD with stent  1st cousin with SLE  Both parents had \"heart problems\"    Allergies:   Allergies   Allergen Reactions    Aspirin Shortness of breath     Other reaction(s): Unknown  \"stop breathing\"      Ibuprofen Shortness of breath     Stop breathing    Iodine Shortness of breath     Other " reaction(s): Unknown    Iodine And Iodide Containing Products Shortness of breath     ? rash    Morphine Shortness of breath      Reported wamrth of face, improved with benadryl and cold compresses after getting morphine as well as episode of (subjective ) dyspnea, and thought she passed out - did have wamrth and erythema of face with mild edema R>L cheek notably on exam.     Penicillins Shortness of breath     Other reaction(s): Unknown    Sulfa (Sulfonamide Antibiotics) Angioedema    Clindamycin     Hydrochlorothiazide      Other reaction(s): Abdominal Pain   Slow heart rate    Oxycodone      Other reaction(s): Vomiting    Sulfamethoxazole-Trimethoprim      Other reaction(s): Vomiting, Weakness     Latex Rash       Medications:   Current Facility-Administered Medications   Medication Dose Route Frequency Provider Last Rate Last Admin    acetaminophen (TYLENOL) tablet 975 mg  975 mg oral q6h [facility] Molly Hidalgo CRNP   975 mg at 08/12/24 0900    amLODIPine (NORVASC) tablet 10 mg  10 mg oral q AM Joel Rodriguez MD   10 mg at 08/12/24 0900    artificial tears (dextran-hpm-glyc) (GENTEAL TEARS) 0.1-0.3-0.2 % eye drops 1 drop  1 drop Both Eyes QID Grant Watts DO   1 drop at 08/12/24 1312    atorvastatin (LIPITOR) tablet 40 mg  40 mg oral Daily (6p) Joel Rodriguez MD        benzocaine-menthol (CEPACOL/CHLORASEPTIC) lozenge 1 lozenge  1 lozenge Mouth/Throat q2h PRN Grant Watts DO   1 lozenge at 08/12/24 0920    benzonatate (TESSALON) capsule 100 mg  100 mg oral 3x daily PRN Joel Rodriguez MD   100 mg at 08/12/24 0920    carboxymethylcellulose (REFRESH PLUS) 0.5 % ophthalmic dropperette 1 drop  1 drop Right Eye 3x daily PRN Neela Bender DO        cyanocobalamin (VITAMIN B12) tablet 1,000 mcg  1,000 mcg oral Daily Joel Rodriguez MD   1,000 mcg at 08/12/24 0901    cyclobenzaprine (FLEXERIL) tablet 5 mg  5 mg oral 3x daily PRN Grant Watts DO        glucose chewable tablet 16-32 g of dextrose  16-32 g of  dextrose oral PRN Joel Rodriguez MD        Or    dextrose 40 % oral gel 15-30 g of dextrose  15-30 g of dextrose oral PRN Joel Rodriguez MD        Or    glucagon (GLUCAGEN) injection 1 mg  1 mg intramuscular PRN Joel Rodriguez MD        Or    dextrose 50 % in water (D50) injection 12.5 g  25 mL intravenous PRN Joel Rodriguez MD        docusate sodium (COLACE) capsule 100 mg  100 mg oral Daily PRN Joel Rodriguez MD        ethacrynic acid (EDECRIN) tablet 50 mg  50 mg oral Daily Grant Watts DO   50 mg at 08/12/24 0902    honey (MEDIHONEY) 100 % topical paste   Topical Daily PRN Nelson Pratt DPM        levothyroxine (SYNTHROID) tablet 100 mcg  100 mcg oral Daily Joel Rodriguez MD   100 mcg at 08/12/24 0901    lidocaine (ASPERCREME) 4 % topical patch 1 patch  1 patch Topical Daily Joel Rodriguez MD   1 patch at 08/12/24 0857    lidocaine (ASPERCREME) 4 % topical patch 1 patch  1 patch Topical Daily Neela Bender DO   1 patch at 08/11/24 0832    lidocaine (ASPERCREME) 4 % topical patch 1 patch  1 patch Topical Daily Amira Logan MD   1 patch at 08/12/24 0857    macitentan (OPSUMIT) tablet 10 mg (Patient Owned)  10 mg oral Daily Grant Watts DO   10 mg at 08/12/24 0902    melatonin ODT 3 mg  3 mg oral Nightly PRN Arleth Pastor CRNP        pantoprazole (PROTONIX) tablet,delayed release (DR/EC) 20 mg  20 mg oral Daily Joel Rodriguez MD   20 mg at 08/12/24 0900    peg 400-hypromellose-glycerin (ARTIFICIAL TEARS) 1-0.2-0.2 % eye drops 1 drop  1 drop Both Eyes q4h while awake Jerson Summers DO   1 drop at 08/12/24 1301    polyethylene glycol (MIRALAX) 17 gram packet 17 g  17 g oral BID Corinne Ortez DO   17 g at 08/11/24 2126    polyethylene glycol (MIRALAX) 17 gram packet 17 g  17 g oral 2x daily PRN Garry, Teri D, MD        senna (SENOKOT) tablet 2 tablet  2 tablet oral BID Teri Castañeda MD   2 tablet at 08/11/24 2127    sildenafiL (pulm.hypertension) (REVATIO) tablet 20 mg   20 mg oral TID Joel Rodriguez MD   20 mg at 08/12/24 1312    sodium chloride (OCEAN) 0.65 % nasal spray 2 spray  2 spray Each Nostril Daily Joel Rodriguez MD   2 spray at 08/12/24 0903    traMADoL (ULTRAM) tablet 50 mg  50 mg oral q6h PRN Molly Hidalgo CRNP        white petrolatum (AQUAPHOR) ointment   Topical q4h PRN Molly Hidalgo CRNP   Given at 08/09/24 1224    white petrolatum (AQUAPHOR) ointment   Topical BID Grant Watts DO   Given at 08/12/24 0903    white petrolatum-mineral oil (ARTIFICIAL TEARS OINT) ophthalmic ointment   Both Eyes Nightly Jerson Summers DO           Review of Systems:   All other systems reviewed and are negative except as per HPI.    Physical Exam:     Wt Readings from Last 10 Encounters:   08/08/24 50.8 kg (112 lb)   08/05/24 49.4 kg (109 lb)   08/05/24 49.4 kg (109 lb)   07/20/24 49.5 kg (109 lb 2 oz)   07/19/24 51.3 kg (113 lb)   06/20/24 51.7 kg (114 lb)   06/14/24 52.6 kg (116 lb)   05/23/24 52.4 kg (115 lb 9.6 oz)   04/17/24 53.5 kg (118 lb)   04/10/24 44.5 kg (98 lb)       BP Readings from Last 5 Encounters:   08/12/24 (!) 125/56   08/05/24 (!) 148/75   07/25/24 (!) 155/77   07/23/24 (!) 101/57   07/19/24 (!) 154/80       Vitals:    08/12/24 1100   BP: (!) 125/56   Pulse: 85   Resp: 18   Temp: 37.1 °C (98.8 °F)   SpO2: 98%       Constitutional: NAD, AAOx3  HENT: Normocephalic, atraumatic head, sclerae anicteric, no cervical lymphadenopathy, trachea midline, facial swelling  Cardiovascular: RRR, no murmurs, rubs or gallops, JVP: 10 cm H2O   cm H2O, no carotid bruits.  Respiratory: CTA bilateral lung fields, no wheezes, rales or rhonchi  GI: soft, non-tender/non-distended  Musculoskeletal / Extremities: no peripheral edema, +2 pulses bilateral radial, DP/PT arteries, skin tightening on face and fingertips, ulceration and discoloration of 2nd phalange b/l LE.   Skin: no rashes. Facial changes c/w scleroderma  Neuro / Psych: no focal deficits, CNII-XII grossly intact, appropriate,  cooperative    Diagnostic Data:     Component      Latest Ref Rng 7/20/2024 7/25/2024   Sodium      136 - 145 mEQ/L 142  139    Potassium, Bld      3.5 - 5.1 mEQ/L 4.2  4.3    Chloride      98 - 107 mEQ/L 107  103    CO2      21 - 31 mEQ/L 25  27    BUN      7 - 25 mg/dL 17  17    Creatinine      0.6 - 1.2 mg/dL 1.2  1.1    Glucose      70 - 99 mg/dL 85  86    Calcium      8.6 - 10.3 mg/dL 9.3  10.6 (H)    AST (SGOT)      13 - 39 IU/L  15    ALT (SGPT)      7 - 52 IU/L  8    Alkaline Phosphatase      34 - 125 IU/L  82    Total Protein      6.0 - 8.2 g/dL  8.8 (H)    Albumin      3.5 - 5.7 g/dL  4.3    Bilirubin, Total      0.3 - 1.2 mg/dL  0.4    eGFR      >=60.0 mL/min/1.73m*2 51.0 (L)  56.6 (L)    Anion Gap      3 - 15 mEQ/L 10  9       Component      Latest Ref Rn 7/20/2024 7/25/2024   WBC      3.80 - 10.50 K/uL 7.27  10.29    Hemoglobin      11.8 - 15.7 g/dL 9.8 (L)  11.2 (L)    Hematocrit      35.0 - 45.0 % 32.0 (L)  37.3    MCV      83.0 - 98.0 fL 80.4 (L)  80.4 (L)    Platelets      150 - 369 K/uL 206  331       Component      Latest Ref Rn 7/19/2024 7/25/2024 8/9/2024   High Sens Troponin I      <15.0 pg/mL 89.2 (HH)  66.8 (HH)  92.5 (HH)    High Sens Troponin I       90.3 (HH)  83.4 (HH)         Component      Latest Ref Rng 5/22/2024 6/22/2024 7/19/2024 7/25/2024   BNP      <=100 pg/mL 352 (H)  150 (H)  151 (H)  220 (H)       Component      Latest Ref Rn 4/16/2024 5/23/2024 7/21/2024 7/22/2024   Sed Rate      0 - 30 mm/hr 101 (H)  79 (H)  105 (H)  >119 (H)       Component      Latest Ref Rng 4/18/2024 5/24/2024   WBC      3.80 - 10.50 K/uL 8.40  16.22 (H)    RBC      3.93 - 5.22 M/uL 3.54 (L)  3.82 (L)    Hemoglobin      11.8 - 15.7 g/dL 9.8 (L)  10.3 (L)    Hematocrit      35.0 - 45.0 % 32.8 (L)  33.9 (L)    Platelets      150 - 369 K/uL 254  289       Component      Latest Ref Rng 5/24/2024   Magnesium      1.8 - 2.5 mg/dL 2.1      Component      Latest Ref Rng 4/13/2023 3/7/2024 5/22/2024   TSH       0.34 - 5.60 mIU/L 4.71  0.28 (L)  1.03      Component      Latest Ref Rn 5/24/2024   Sodium      136 - 145 mEQ/L 138    Potassium, Bld      3.5 - 5.1 mEQ/L 4.4    Chloride      98 - 107 mEQ/L 104    CO2      21 - 31 mEQ/L 24    BUN      7 - 25 mg/dL 40 (H)    Creatinine      0.6 - 1.2 mg/dL 1.1    Glucose      70 - 99 mg/dL 87    Calcium      8.6 - 10.3 mg/dL 9.5    eGFR      >=60.0 mL/min/1.73m*2 56.6 (L)    Anion Gap      3 - 15 mEQ/L 10      Component      Latest Ref Rn 4/13/2023   Hemoglobin A1C      <5.7 % 4.4        Component      Latest Ref [facility] 4/14/2023   Triglycerides      30 - 149 mg/dL 44    Cholesterol      <=200 mg/dL 194    HDL      >=55 mg/dL 49 (L)    LDL Calculated      <=100 mg/dL 136 (H)    Non-HDL, Calculated      mg/dL 145    RISK      <=5.0  4.0       Component      Latest Ref [facility] 4/13/2023   High Sens Troponin I      <15.0 pg/mL 23.0 (H)       Component      Latest Ref [facility] 4/14/2023   Ferritin      11 - 250 ng/mL 75      Component      Latest Ref [facility] 4/14/2023   Iron      35 - 150 ug/dL 29 (L)    TIBC      270 - 460 ug/dL 245 (L)    UIBC      180 - 360 ug/dL 216    Iron Saturation      15 - 45 % 12 (L)        Component      Latest Ref Rn 6/27/2022 4/13/2023   BNP      <=100 pg/mL 111 (H)  105 (H)                          ---    ECG (8/2/2024, personally reviewed):   Sinus tachycardia   Left ventricular hypertrophy with repolarization abnormality    HR: 117 bpm     ---        Lower extremity arterial duplex and ENRRIQUE August 10, 2024:  Right ankle ENRRIQUE (DP): 0.74.  Right ankle ENRRIQUE (PT): 0.88.  Left ankle ENRRIQUE (DP): 0.90.  Left ankle ENRRIQUE (PT): 0.62.    Findings concerning for hemodynamically significant stenosis within the  bilateral popliteal arteries and distal left femoral artery. There are also  findings which can be seen with inflow stenosis at the level left common femoral  artery. CTA of the abdomen and pelvis with lower extremity runoff could be  performed for further evaluation if  clinically indicated.    CT Chest / Abdomen / Pelvis w/ Contrast (7/25/2024, personally reviewed):     Lung bases: Left-sided pleural effusion, left parenchymal consolidation, and  findings of interstitial lung disease.  More nodular density in the medial  aspect of the left lower lobe measuring 1.6 x 1.9 cm.  Cardiomegaly with a  pericardial effusion.     Liver: The liver is normal in size.  There is no focal mass.  Hepatic veins and  portal veins are patent without focal filling defects to suggest thrombosis..     Gallbladder:  Small dependent gallstones.  No gallbladder wall thickening..     Spleen: Spleen is normal in size without focal mass lesion..     Pancreas: The pancreas is normal without focal mass, parenchymal atrophy, or  pancreatic duct dilation..     Adrenals: The adrenals are normal bilaterally without focal mass..     Kidneys, ureters and bladder:  Symmetric enhancement and excretion..  Left lower  pole cystic lesion measures 3.2 cm in maximal dimension.  This measures greater  than simple fluid density.  This measured 60 Hounsfield units on the noncontrast  CT from 2020.  It measures 36 Hounsfield units on today's study.  Therefore it  is most in keeping with a hemorrhagic/proteinaceous cyst.  Additional  hypodensities too small to characterize.     Retroperitoneal structures: There is no abdominal aortic aneurysm.  Atherosclerotic calcification.  There is no retroperitoneal adenopathy or  retroperitoneal mass..     Bowel and mesentery: No evidence of a focal inflammatory or obstructive process.  Moderate fecal retention throughout the colon.  There are a few fluid-filled  small bowel loops in the pelvis, nonspecific.     Lymph nodes: No pathologic lymphadenopathy..     Pelvis: The uterus is normal in size.  The ovaries are normal.  There is no  adnexal mass or pelvic free fluid.     Bones: Within normal limits.    No evidence for pulmonary embolism.  Persistent left-sided pleural effusion and  airspace disease at the left lung  base.  Underlying interstitial lung disease.     TTE (7/19/2024, personally reviewed):   Mild increased wall thickness with normal left ventricular cavity and preserved systolic function. EF 65%. No wall motion abnormalities.   Normal right ventricular size with preserved systolic function.   IVC normal in size with >50% respiratory variation consistent with normal right sided filling pressures.   Small pericardial effusion. No evidence of hemodynamic compromise with normal respiratory variation and no chamber collapse. Prominent epicardial fat.   Compared with previous study of 5/23/2024, small pericardial effusion persists.      PET/CT Skull-to-Thigh (6/4/2024):   An approximately 1.6 cm pulmonary nodule at the medial left lung base is  significantly FDG avid at max SUV 8.5.  This is nonspecific and can be seen in  the setting of benign pathology however malignancy is a concern.     There is more mild to moderate nonspecific uptake localizing to lita-fissural  nodules particularly on the left.     There is  moderate nonspecific left hilar and mediastinal dawson uptake.     No additional suspicious FDG avid findings appreciated.     CTA Chest (5/21/2024):   IMPRESSION:  1.  No evidence of acute pulmonary embolism.  2.  Multiple new perifissural left lower lobe nodules, suspicious for a  neoplastic process. PET/CT and or tissue sampling is recommended.  3.  Chronic interstitial lung disease in an NSIP pattern, with overall slightly  progressed appearance since March 2023. Pulmonary consultation is recommended.  4.  Stable cardiomegaly and large pericardial effusion.  5.  Mediastinal and supraclavicular adenopathy, similar to prior study, also  indeterminate, which could be further evaluated at time of PET/CT.     CTA Chest (4/16/2024):   IMPRESSION:  1. No evidence of pulmonary embolism.  2. Stable cardiomegaly and large pericardial effusion. Cardiology consultation  suggested.  3.  Emphysema with bilateral lower lobe groundglass opacity and bronchiectasis in  an NSIP pattern, which can be seen with scleroderma. Pulmonary consultation  suggested.  4. Stable 16 mm left lower lobe masslike density which may represent  atelectasis, lung cancer or lymphoma. When clinically appropriate PET/CT  suggested.  5. Stable prominence of the main pulmonary artery which can be seen with  pulmonary hypertension     TTE (5/23/2024, personally reviewed):  Mild increased wall thickness with normal left ventricular cavity and preserved systolic function. EF 65%. No wall motion abnormalities.   Normal right ventricular size with preserved systolic function.   Tiny IVC with >50% respiratory variation consistent with low-normal right sided filling pressures.   Small pericardial effusion, predominantly adjacent to right atrium. No evidence of hemodynamic compromise with normal respiratory variation and no chamber collapse. Prominent epicardial fat.   Compared with previous study of 4/10/24, small pericardial effusion persists.      TTE (4/10/2024, personally reviewed):  1. Normal left ventricular cavity size with mild concentric left ventricular hypertrophy. Normal systolic function. EF: 60%. No regional wall motion abnormalities. Cannot determine diastolic function. Global longitudinal strain not reported due to technical limitations of study.   2. Aortic valve has three cusps. Trace aortic regurgitation. Aortic valve and root sclerosis.  3. Thickened mitral valve leaflets. Trace mitral valve regurgitation. Normal left atrial size.   4. Normal right ventricular structure and function. Trace tricuspid valve regurgitation with estimated RVSP: 51 mmHg (assuming right atrial pressure of 3 mmHg). Normal right atrial size. Pulmonic valve structurally normal. Trace pulmonic valve regurgitation. Pulsed wave Doppler of the RVOT systolic profile shows decreased acceleration times and geometry consistent with mildly elevated  PVR.  5. IVC size is normal with > 50% inspiratory collapse consistent with normal right atrial pressure. Small-to-moderate sized, circumferential pericardial effusion. No echocardiographic evidence for cardiac tamponade.   6. Compared to previous study from 3/4/2024, no significant change. Pericardial effusion size is similar, estimated right atrial pressure lower.           CTA Abd / Pelvis (3/13/2024, personally reviewed):   There is dilatation of the of proximal/mid small bowel with relatively abrupt  transition left hemiabdomen, likely obstruction. Questionable lesion at the  transition point. This could be better evaluated with CT or MRI enterography.     Aorta and major branches patent. Superior mesenteric artery patent without  significant stenosis.  Suspect moderate stenosis of the origins of the of renal arteries bilaterally.     Large pericardial effusion, no change.     Patchy opacities lower lungs, similar to prior.      Coronary CTA (3/11/2024):   1. Two-vessel coronary artery disease as above that is moderate in severity.  CT  FFR is normal..  2. There is mitral annular calcification and aortic sclerosis.  The right atrium  is enlarged.  There is left ventricular hypertrophy.  There is a moderate to  large circumferential pericardial effusion.  3. Normal left ventricular wall motion and systolic function.  4. Coronary calcium score 313, 96th percentile     TTE (3/4/2024, personally reviewed):   Normal-sized left ventricle. Mild left ventricular hypertrophy. Preserved systolic function. LVEF 60%. No regional wall motion abnormalities. Grade II diastolic dysfunction.  Normal global longitudinal strain (-20%).  The aortic valve is not well seen.  Cannot determine the number of cusps.  Sclerotic leaflets.  No aortic stenosis.  Trace aortic insufficiency.  Normal sized aortic root.  The visualized portion of the ascending aorta is normal in size.  The mitral valve leaflets are thickened. Mild mitral  annular calcification. Trace mitral regurgitation.   Mildly dilated left atrium.  Normal-sized right ventricle. Normal right ventricular systolic function.  Thickened tricuspid valve. There is mild tricuspid regurgitation with estimated RVSP of 46 mmHg.   Pulmonic valve is not well visualized. Trace pulmonic regurgitation.   Mildly dilated right atrium.  IVC is enlarged with <50% respiratory variation consistent with elevated right atrial filling pressure (at least 15 mmHg).   Moderate, circumferential, circumferential pericardial effusion.  The largest pocket is located inferolaterally.  Elevated right atrial pressure.  No other clear echocardiographic features of increased pericardial pressure.  Correlate clinically.   Compared to previous TTE from 6/21/2023, pericardial effusion now moderate in size.  Right atrial pressure now elevated.         CTA Chest (3/3/2024):   IMPRESSION:  1.  No evidence of acute pulmonary embolism to the level of the segmental  branches.  2.  Moderate to large pericardial effusion measuring higher than simple fluid  density.  3.  Lower lobe lung field predominant interstitial thickening with some relative  subpleural sparing, consistent with a chronic interstitial lung disease,  unchanged from the prior study.  4.  Continued bilateral axillary, mediastinal, and bilateral hilar  lymphadenopathy, not definitely changed from the prior study, possibly related  to the patient's sarcoid.  5.  Emphysema.      CTA Chest PE (10/4/2023):  IMPRESSION:  1. No evidence for filling defect or vessel cutoff to suggest pulmonary  embolism.  Enlargement of the pulmonary arteries compatible with pulmonary  arterial hypertension.  2. Changes throughout the lungs as described above which can be seen with  systemic sclerosis/scleroderma.  Underlying pneumonia the lung bases cannot be  entirely excluded in the proper clinical setting.  3.  Additional stable findings as described above.        VQ  (9/12/2023):  IMPRESSION:  Intermediate to high probability of pulmonary embolic disease.     6MWT (7/25/2023, on Sildenafil 20 mg TID):        TTE (6/21/2023, personally reviewed):  1. Normal left ventricular cavity size and mild concentric left ventricular hypertrophy. Normal systolic function. EF: 60%. No regional wall motion abnormalities. Grade I diastolic dysfunction.   2. Aortic valve cusps not well visualized. Trace aortic regurgitation. Aortic valve and root sclerosis.  3. Thickened mitral valve leaflets. Mild mitral annular calcification. Trace mitral regurgitation. Mildly dilated left atrial size.   4. Normal right ventricular structure and function. Mild tricuspid valve regurgitation with estimated RVSP: 55 mmHg (assuming right atrial pressure of 3 mmHg). Normal right atrial size. Pulmonic valve not well visualized. Trace pulmonic valve regurgitation. Pulsed wave Doppler of the RVOT systolic profile show decreased acceleration times ( msec) and late systolic notching consistent with elevated PVR.   5. IVC size is normal with > 50% inspiratory collapse consistent with normal right atrial pressure. Small pericardial effusion without echocardiographic evidence of tamponade.  6. Compared to previous study from 4/14/2023, estimated right ventricular systolic pressure is higher.        TTE (4/14/2023, personally reviewed):   The left ventricular cavity size is normal with mild left ventricular hypertrophy and preserved systolic function. Estimated EF 65%. No regional wall motion abnormalities. Grade I diastolic dysfunction.     The aortic valve is tricuspid. Aortic valve sclerosis. Trace aortic regurgitation.      The visualized portions of the aortic root and ascending aorta are normal in size.     The mitral valve is mildly thickened. Mild mitral annular calcification. Trace mitral regurgitation.       The left atrium is severely dilated.      The right ventricle is normal in size with preserved  systolic function     The pulmonic valve is not well seen.     The tricuspid valve is normal in appearance. mild tricuspid regurgitation with an estimated RVSP of 34 mmHg.       Right atrium is normal in size.     The IVC is small and collapses > 50% with inspiration consistent with normal right atrial pressures.     Small pericardial effusion, mostly posterior.       Compared to previous echocardiogram from 8/22/2022, there is no significant change        TTE (11/28/2022, personally reviewed):   Normal right- and mildly elevated left-sided filling pressures (RA: 3 mmHg, PCWP: 13 mmHg)  Elevated pulmonary artery pressures (60/22(35 mmHg)) in the setting of PCWP: 13 mmHg.   Normal cardiac output and index (CO: 5.1 L/min, CI: 3.2 L/min/m2)  PVR: 4.3 Wood units. SVR: 1840 dynes/sec/cm5      TTE (8/22/2022, personally reviewed):  Normal-sized LV. Normal LV systolic function. Estimated EF 55- 60%. Wall motion appears grossly normal. Mild concentric left ventricular hypertrophy. Grade I LV diastolic dysfunction.  Pulmonic valve not well visualized. Grossly normal pulmonic valve structure. Trace pulmonic valve regurgitation. No pulmonic valve stenosis.  Aortic valve not well visualized. Aortic valve not well seen but likely trileaflet. Focal aortic valve calcification present. Sclerotic aortic valve leaflets. Mild aortic valve regurgitation. No aortic valve stenosis.  RV not well visualized. Normal-sized RV. Normal RV systolic function.  Normal-sized RA.  There is a small loculated pericardial effusion anterior to the right atrium. No cardiac tamponade.  Sclerotic mitral valve. Mitral valve calcification of the anterior leaflet present.  Trace mitral valve regurgitation.  No significant mitral valve stenosis.  Normal-sized IVC. IVC demonstrates normal respiratory collapse.  Tricuspid valve structure is grossly normal. Mild to moderate tricuspid valve regurgitation.  Estimated RVSP = 50-55 mmHg.  Mildly dilated LA.  No  previous echocardiogram available for comparison.     TTE (4/21/2022, outside study):  This result has an attachment that is not available.     A complete transthoracic echocardiogram (including 2D, color flow   Doppler, spectral Doppler and M-mode imaging) was performed using the   standard protocol. The study quality was adequate.     The left ventricle is normal in size. There is moderately increased   wall thickness consistent with moderate concentric hypertrophy.   Endocardium is incompletely visualized, but no regional wall motion   abnormalities are noted in the visualized segments. Normal left   ventricular ejection fraction. The left ventricular ejection fraction by   visual estimate is 55% to 60%.     The right ventricle is normal in size. There is normal function of the   right ventricle.     The left atrium is normal in size. Left atrial volume is 58 mL.     The right atrial volume measures 52 mL. The right atrial volume index   measures 34 mL/m2.     There is trace mitral regurgitation. There is no mitral stenosis.     The aortic valve is trileaflet. There is mild aortic valve thickening.   There is no aortic stenosis. There is trace aortic regurgitation.     There is mild to moderate tricuspid regurgitation. Pulmonary artery   systolic pressure measures 52 mmHg. The pulmonary artery systolic pressure   is moderately elevated.     The aorta measures 3.2 cm at the sinus of Valsalva. The proximal   segment of the ascending aorta is mildly enlarged. The proximal segment of   the ascending aorta measures 3.4 cm. The proximal segment of the ascending   aorta measures 2.2 cm/m2, indexed for body surface area.     Trivial pericardial effusion present. There is no echocardiographic   evidence of cardiac tamponade.     The inferior vena cava is normal in size (diameter <21 mm) and   decreases >50% in size with inspiration, suggesting a normal right atrial   pressure of 3 mmHg (range 0-5 mm Hg).     Compared to  the prior study of 3/13/2020, there are no significant   changes.        PFT (3/23/2022):      PFT (7/14/2021):      CT Chest (5/2/2021, outside study):  CLINICAL INFORMATION: Systemic sclerosis. Interstitial lung disease. Groundglass nodule     PROCEDURE: Unenhanced CT of the chest was performed using thin section reconstructions. Images were obtained on inspiration and expiration.     COMPARISON: June and August 2020     FINDINGS:     LUNGS, PLEURA:     Groundglass opacities with reticulation with subpleural sparing in the lower lobes, without honeycombing or architectural distortion, unchanged.     Right upper lobe groundglass nodule, image 114 of series 3, 8 x 11 mm, previously 6 mm.     Expiratory images are of diagnostic quality and do not demonstrate evidence of clinically significant air trapping.     CARDIOVASCULAR, MEDIASTINUM, THYROID: Coronary calcifications. Trace pericardial effusion.     LYMPH NODES: Subcentimeter mediastinal lymph nodes, unchanged. Unchanged axillary lymph nodes.     SKELETON, CHEST WALL: No destructive bone lesion     UPPER ABDOMEN: Patulous esophagus. 3 mm right renal stone.       RHC (9/02/2020):  RA   8 mmHg   RV   40/5 mmHg   PA (mean)  44/16 (25) mmHg   PCWP   12 mmHg   CO   4.65 lpm (Thermodilution)   CI   2.65 lpm/m-squared   SVI    33 ml/beat/m-squared (lower limit normal 29)   PVR   2.8 mmHg/l/min (Wood units)   SVR   2064 dyne-sec-cm-5         PFT (3/18/2020):      TTE (3/13/2020, outside study):  · The study quality was good.   · The left ventricle is normal in size. Top normal left ventricular wall   thickness. There are no segmental wall motion abnormalities. The left   ventricular ejection fraction is 55-60% by visual estimate and 3D   echocardiography. Normal left ventricular ejection fraction.   · There is grade I (mild) LV diastolic dysfunction.   · The right ventricle is normal in size. There is normal function of the   right ventricle.   · The right atrium is  mildly dilated.   · There is mild mitral valve anterior leaflet thickening. There is mild   mitral valve leaflet calcification. There is flattening of the mitral   leaflets without raphael prolapse. There is trace mitral regurgitation.   · The aortic valve is trileaflet. There is mild thickening of the aortic   valve. There is mild calcification of the aortic valve. There is no aortic   /stenosis. There is trace aortic regurgitation.   · There is mild tricuspid valve leaflet thickening. There is mild to   moderate tricuspid regurgitation. The pulmonary artery systolic pressure   is moderately elevated. Pulmonary artery systolic pressure measures 50   mmHg. There is mid-systolic notching of the RVOT VTI consistent with   elevated pulmonary vascular resistance.   · The inferior vena cava is normal in size (diameter <21 mm) and decreases   >50% in size with inspiration, suggesting a normal right atrial pressure   of 3 mmHg (range 0-5 mm Hg).   · Trivial loculated pericardial effusion present adjacent to the right   ventricle and adjacent to the right atrium.          Assessment / Plan:     1. Chest Pain:  - Given pattern and character, I believe this is musculoskeletal / serous pain from her autoimmune disease. There may be an element of myopericarditis.  - She reports that pain has not responded to increased in steroids in the past   - Non-ACS chest pain and known non-obstructive CAD as recently as March 2024 CCTA.   -A lidocaine patch was placed just prior to me entering the room.  Hopefully this will help her pain.    2. Pulmonary Hypertension (WHO Group I, NYHA/WHO FC III):   - Occurring in the background of Systemic Scleroderma with ILD. August 2022 TTE showed normal RV size and function, but progressive exertional decline, worsened DLCO and resting tachycardia suggest there may be progression of her pulmonary vascular disease and limitation of cardiac output. This was proven with 11/2022 RHC showing mean PA 35 mmHg  (with PCWP 13 mmHg) and PVR 4.3 Wood units. Recent TTEs have shown appropriate RV:PA coupling with normal RV size and systolic function.   - She is hypervolemic on exam  - CXR improved today  -She was switched to ethacrynic acid 50 mg p.o. daily yesterday.  Still mildly overloaded but this is higher than home dose so hopefully will achieve diuresis.  Please continue daily standing weights.  Please start recording I's and O's.  - Continue Sildenafil 20 mg TID   - She should continue uninterrupted Macitentan 10 mg daily -her home medication has been brought in and she is receiving it.    3. HTN:   - Continue Amlodipine.     4. Systemic Scleroderma / Raynaud's Disease:   - Per Rheumatology (Dr. Trevizo).      5. ILD:   - Per Pulmonology and Rheumatology (Dr. Trevizo). She has not tolerated trials of Mycophenolate. She briefly received Tocilizumab.     6. PE:   - She has had elevated probability on V/Q scans, but CTA Chest has consistently not shown evidence for acute or chronic thromboembolism. Apixaban has been discontinued.     7. CAD:   - Two-vessel non-obstructive disease on March 2024 CCTA. She is continued on Atorvastatin 40 mg daily.     8. Toe Ulcerations  - Per Rheumatology, unlikely to see LE ulcerations with scleroderma  -Results of ultrasound noted.  Would consult vascular.    9. Lung Mass  - Pathology from 8/8 biopsy with atypical glandular cells, possibly consistent with adenocarcinoma  - Management per pulmonology    Aneesh Villegas MD  8/12/2024

## 2024-08-13 PROBLEM — I73.9 PERIPHERAL ARTERIAL DISEASE (CMS/HCC): Status: ACTIVE | Noted: 2024-08-02

## 2024-08-13 PROBLEM — R91.1 PULMONARY NODULE: Status: ACTIVE | Noted: 2024-04-16

## 2024-08-13 PROBLEM — R45.89 ANXIOUS APPEARANCE: Status: ACTIVE | Noted: 2024-08-13

## 2024-08-13 LAB
ANION GAP SERPL CALC-SCNC: 6 MEQ/L (ref 3–15)
ATRIAL RATE: 115
BUN SERPL-MCNC: 23 MG/DL (ref 7–25)
CALCIUM SERPL-MCNC: 9.4 MG/DL (ref 8.6–10.3)
CHLORIDE SERPL-SCNC: 106 MEQ/L (ref 98–107)
CO2 SERPL-SCNC: 29 MEQ/L (ref 21–31)
CREAT SERPL-MCNC: 1 MG/DL (ref 0.6–1.2)
EGFRCR SERPLBLD CKD-EPI 2021: >60 ML/MIN/1.73M*2
ERYTHROCYTE [DISTWIDTH] IN BLOOD BY AUTOMATED COUNT: 18.6 % (ref 11.7–14.4)
GLUCOSE SERPL-MCNC: 113 MG/DL (ref 70–99)
HCT VFR BLD AUTO: 34.3 % (ref 35–45)
HGB BLD-MCNC: 10.3 G/DL (ref 11.8–15.7)
MAGNESIUM SERPL-MCNC: 1.6 MG/DL (ref 1.8–2.5)
MCH RBC QN AUTO: 24.4 PG (ref 28–33.2)
MCHC RBC AUTO-ENTMCNC: 30 G/DL (ref 32.2–35.5)
MCV RBC AUTO: 81.3 FL (ref 83–98)
P AXIS: 59
PDW BLD AUTO: 11.4 FL (ref 9.4–12.3)
PLATELET # BLD AUTO: 199 K/UL (ref 150–369)
POTASSIUM SERPL-SCNC: 3.8 MEQ/L (ref 3.5–5.1)
PR INTERVAL: 144
QRS DURATION: 88
QT INTERVAL: 342
QTC CALCULATION(BAZETT): 473
R AXIS: 16
RBC # BLD AUTO: 4.22 M/UL (ref 3.93–5.22)
SODIUM SERPL-SCNC: 141 MEQ/L (ref 136–145)
T WAVE AXIS: 101
VENTRICULAR RATE: 115
WBC # BLD AUTO: 5.64 K/UL (ref 3.8–10.5)

## 2024-08-13 PROCEDURE — 63700000 HC SELF-ADMINISTRABLE DRUG: Performed by: NURSE PRACTITIONER

## 2024-08-13 PROCEDURE — 93010 ELECTROCARDIOGRAM REPORT: CPT | Performed by: INTERNAL MEDICINE

## 2024-08-13 PROCEDURE — 63700000 HC SELF-ADMINISTRABLE DRUG: Performed by: HOSPITALIST

## 2024-08-13 PROCEDURE — 99232 SBSQ HOSP IP/OBS MODERATE 35: CPT | Performed by: INTERNAL MEDICINE

## 2024-08-13 PROCEDURE — 85027 COMPLETE CBC AUTOMATED: CPT | Performed by: NURSE PRACTITIONER

## 2024-08-13 PROCEDURE — 36415 COLL VENOUS BLD VENIPUNCTURE: CPT | Performed by: NURSE PRACTITIONER

## 2024-08-13 PROCEDURE — 83735 ASSAY OF MAGNESIUM: CPT | Performed by: NURSE PRACTITIONER

## 2024-08-13 PROCEDURE — 80048 BASIC METABOLIC PNL TOTAL CA: CPT | Performed by: NURSE PRACTITIONER

## 2024-08-13 PROCEDURE — 99233 SBSQ HOSP IP/OBS HIGH 50: CPT | Performed by: INTERNAL MEDICINE

## 2024-08-13 PROCEDURE — 93005 ELECTROCARDIOGRAM TRACING: CPT | Performed by: NURSE PRACTITIONER

## 2024-08-13 PROCEDURE — 63600000 HC DRUGS/DETAIL CODE: Mod: JZ | Performed by: NURSE PRACTITIONER

## 2024-08-13 PROCEDURE — 21400000 HC ROOM AND CARE CCU/INTERMEDIATE

## 2024-08-13 PROCEDURE — 63700000 HC SELF-ADMINISTRABLE DRUG: Performed by: INTERNAL MEDICINE

## 2024-08-13 PROCEDURE — 99233 SBSQ HOSP IP/OBS HIGH 50: CPT | Performed by: NURSE PRACTITIONER

## 2024-08-13 PROCEDURE — 63700000 HC SELF-ADMINISTRABLE DRUG: Performed by: STUDENT IN AN ORGANIZED HEALTH CARE EDUCATION/TRAINING PROGRAM

## 2024-08-13 PROCEDURE — 63700000 HC SELF-ADMINISTRABLE DRUG: Performed by: PHYSICIAN ASSISTANT

## 2024-08-13 RX ORDER — ONDANSETRON HYDROCHLORIDE 2 MG/ML
4 INJECTION, SOLUTION INTRAVENOUS EVERY 8 HOURS PRN
Status: DISCONTINUED | OUTPATIENT
Start: 2024-08-13 | End: 2024-08-20

## 2024-08-13 RX ORDER — PREDNISONE 50 MG/1
50 TABLET ORAL
Status: COMPLETED | OUTPATIENT
Start: 2024-08-14 | End: 2024-08-14

## 2024-08-13 RX ORDER — TRAMADOL HYDROCHLORIDE 50 MG/1
25 TABLET ORAL EVERY 6 HOURS PRN
Status: DISCONTINUED | OUTPATIENT
Start: 2024-08-13 | End: 2024-08-14

## 2024-08-13 RX ORDER — ONDANSETRON 4 MG/1
4 TABLET, ORALLY DISINTEGRATING ORAL EVERY 8 HOURS PRN
Status: DISCONTINUED | OUTPATIENT
Start: 2024-08-13 | End: 2024-08-20

## 2024-08-13 RX ORDER — LORAZEPAM 0.5 MG/1
0.25 TABLET ORAL ONCE
Status: DISCONTINUED | OUTPATIENT
Start: 2024-08-13 | End: 2024-08-14

## 2024-08-13 RX ORDER — DIPHENHYDRAMINE HCL 25 MG
50 CAPSULE ORAL ONCE
Status: COMPLETED | OUTPATIENT
Start: 2024-08-14 | End: 2024-08-14

## 2024-08-13 RX ORDER — DIPHENHYDRAMINE HCL 50 MG/ML
50 VIAL (ML) INJECTION ONCE
Status: DISCONTINUED | OUTPATIENT
Start: 2024-08-14 | End: 2024-08-13

## 2024-08-13 RX ORDER — TRAMADOL HYDROCHLORIDE 50 MG/1
25 TABLET ORAL ONCE
Status: COMPLETED | OUTPATIENT
Start: 2024-08-13 | End: 2024-08-13

## 2024-08-13 RX ORDER — ETHACRYNIC ACID 25 MG/1
50 TABLET ORAL ONCE
Status: COMPLETED | OUTPATIENT
Start: 2024-08-13 | End: 2024-08-13

## 2024-08-13 RX ORDER — DIPHENHYDRAMINE HCL 50 MG/ML
50 VIAL (ML) INJECTION ONCE
Status: DISCONTINUED | OUTPATIENT
Start: 2024-08-13 | End: 2024-08-13

## 2024-08-13 RX ADMIN — LIDOCAINE 4% 1 PATCH: 40 PATCH TOPICAL at 09:06

## 2024-08-13 RX ADMIN — Medication 3 MG: at 00:54

## 2024-08-13 RX ADMIN — DEXTRAN 70, GLYCERIN, HYPROMELLOSE 1 DROP: 1; 2; 3 SOLUTION/ DROPS OPHTHALMIC at 13:52

## 2024-08-13 RX ADMIN — DEXTRAN 70, GLYCERIN, HYPROMELLOSE 1 DROP: 1; 2; 3 SOLUTION/ DROPS OPHTHALMIC at 17:02

## 2024-08-13 RX ADMIN — PANTOPRAZOLE SODIUM 20 MG: 20 TABLET, DELAYED RELEASE ORAL at 09:00

## 2024-08-13 RX ADMIN — MAGNESIUM SULFATE HEPTAHYDRATE 2 G: 40 INJECTION, SOLUTION INTRAVENOUS at 13:57

## 2024-08-13 RX ADMIN — LEVOTHYROXINE SODIUM 100 MCG: 0.1 TABLET ORAL at 09:00

## 2024-08-13 RX ADMIN — ACETAMINOPHEN 975 MG: 325 TABLET ORAL at 09:01

## 2024-08-13 RX ADMIN — ACETAMINOPHEN 975 MG: 325 TABLET ORAL at 23:03

## 2024-08-13 RX ADMIN — MINERAL OIL, WHITE PETROLATUM: .03; .94 OINTMENT OPHTHALMIC at 21:19

## 2024-08-13 RX ADMIN — CYANOCOBALAMIN TAB 1000 MCG 1000 MCG: 1000 TAB at 09:00

## 2024-08-13 RX ADMIN — CYCLOBENZAPRINE HYDROCHLORIDE 5 MG: 5 TABLET, FILM COATED ORAL at 23:03

## 2024-08-13 RX ADMIN — ONDANSETRON 4 MG: 2 INJECTION INTRAMUSCULAR; INTRAVENOUS at 12:17

## 2024-08-13 RX ADMIN — PREDNISONE 50 MG: 50 TABLET ORAL at 23:04

## 2024-08-13 RX ADMIN — ACETAMINOPHEN 975 MG: 325 TABLET ORAL at 03:11

## 2024-08-13 RX ADMIN — AMLODIPINE BESYLATE 10 MG: 10 TABLET ORAL at 09:00

## 2024-08-13 RX ADMIN — TRAMADOL HYDROCHLORIDE 25 MG: 50 TABLET, COATED ORAL at 10:19

## 2024-08-13 RX ADMIN — SILDENAFIL 20 MG: 20 TABLET, FILM COATED ORAL at 17:04

## 2024-08-13 RX ADMIN — ETHACRYNIC ACID 50 MG: 25 TABLET ORAL at 09:25

## 2024-08-13 RX ADMIN — CYCLOBENZAPRINE HYDROCHLORIDE 5 MG: 5 TABLET, FILM COATED ORAL at 00:10

## 2024-08-13 RX ADMIN — GLYCERIN 1 DROP: .002; .002; .01 SOLUTION/ DROPS OPHTHALMIC at 21:18

## 2024-08-13 RX ADMIN — GLYCERIN 1 DROP: .002; .002; .01 SOLUTION/ DROPS OPHTHALMIC at 09:10

## 2024-08-13 RX ADMIN — WHITE PETROLATUM: 1.75 OINTMENT TOPICAL at 21:17

## 2024-08-13 RX ADMIN — DEXTRAN 70, GLYCERIN, HYPROMELLOSE 1 DROP: 1; 2; 3 SOLUTION/ DROPS OPHTHALMIC at 09:08

## 2024-08-13 RX ADMIN — DEXTRAN 70, GLYCERIN, HYPROMELLOSE 1 DROP: 1; 2; 3 SOLUTION/ DROPS OPHTHALMIC at 21:19

## 2024-08-13 RX ADMIN — SILDENAFIL 20 MG: 20 TABLET, FILM COATED ORAL at 09:01

## 2024-08-13 RX ADMIN — Medication 3 MG: at 23:03

## 2024-08-13 RX ADMIN — WHITE PETROLATUM: 1.75 OINTMENT TOPICAL at 09:08

## 2024-08-13 RX ADMIN — ANTACID TABLETS 200 MG OF ELEMENTAL CALCIUM: 500 TABLET, CHEWABLE ORAL at 00:10

## 2024-08-13 RX ADMIN — BENZOCAINE 6 MG-MENTHOL 10 MG LOZENGES 1 LOZENGE: at 21:16

## 2024-08-13 RX ADMIN — GLYCERIN 1 DROP: .002; .002; .01 SOLUTION/ DROPS OPHTHALMIC at 17:04

## 2024-08-13 RX ADMIN — SILDENAFIL 20 MG: 20 TABLET, FILM COATED ORAL at 21:15

## 2024-08-13 RX ADMIN — ACETAMINOPHEN 975 MG: 325 TABLET ORAL at 17:03

## 2024-08-13 RX ADMIN — SALINE NASAL SPRAY 2 SPRAY: 1.5 SOLUTION NASAL at 09:09

## 2024-08-13 RX ADMIN — ETHACRYNIC ACID 50 MG: 25 TABLET ORAL at 17:09

## 2024-08-13 ASSESSMENT — COGNITIVE AND FUNCTIONAL STATUS - GENERAL
WALKING IN HOSPITAL ROOM: 3 - A LITTLE
STANDING UP FROM CHAIR USING ARMS: 3 - A LITTLE
CLIMB 3 TO 5 STEPS WITH RAILING: 3 - A LITTLE
MOVING TO AND FROM BED TO CHAIR: 3 - A LITTLE
CLIMB 3 TO 5 STEPS WITH RAILING: 3 - A LITTLE
WALKING IN HOSPITAL ROOM: 3 - A LITTLE
STANDING UP FROM CHAIR USING ARMS: 3 - A LITTLE
WALKING IN HOSPITAL ROOM: 3 - A LITTLE
STANDING UP FROM CHAIR USING ARMS: 3 - A LITTLE
CLIMB 3 TO 5 STEPS WITH RAILING: 3 - A LITTLE
MOVING TO AND FROM BED TO CHAIR: 3 - A LITTLE
MOVING TO AND FROM BED TO CHAIR: 3 - A LITTLE

## 2024-08-13 NOTE — PLAN OF CARE
Care Coordination Discharge Plan Note     Discharge Needs Assessment  Concerns to be Addressed: care coordination/care conferences, discharge planning  Current Discharge Risk: chronically ill    Anticipated Discharge Plan  Anticipated Discharge Disposition: home with home health  Type of Home Care Services: home PT, nursing, home OT      Patient Choice  Offered/Gave Vendor List: yes  Patient's Choice of Community Agency(s): [facility]    Patient and/or patient guardian/advocate was made aware of their right to choose a provider. A list of eligible providers was presented and reviewed with the patient and/or patient guardian/advocate in written and/or verbal form. The list delineates providers in the patient’s desired geographic area who are participating in the Medicare program and/or providers contracted with the patient’s primary insurance. The Medicare list and quality ratings were obtained from the Medicare.gov [medicare.gov] website.    ---------------------------------------------------------------------------------------------------------------------    Interdisciplinary Discharge Plan Review:  Participants:occupational therapy, , social work/services, pharmacy, physical therapy, nursing, physician    Concerns Comments: Per information obtained at discharge planning rounds, Vascular surgery is following pt and the plan is for a bilateral lower extremity angiogram on  8/14,  Pt is lives alone and is followed by [facility]--once DC determined, fax AVS to [phone].   Pt has O2 w/ MM--will need her port tank brought in for DC home. Family to provide transport home at Dc. (Family to retrieve house key from pt to get her port tank for DC per [facility] notes). Care Coordination will continue to follow until discharge is complete.    Discharge Plan:   Disposition/Destination: Home Health Care - Other / Home  Discharge Facility:    Community Resources:      Discharge Transportation:  Is Out of  Hospital DNR needed at Discharge: no  Does patient need discharge transport? No

## 2024-08-13 NOTE — PRE-PROCEDURE NOTE
"Vascular Surgery Pre-operative Note    Pre-op diagnosis Pre-op Diagnosis     * Lung nodule [R91.1]     * Thoracic lymphadenopathy [R59.0]    Procedure:  ANGIOGRAM/ANGIOPLASTY FEMORAL , POSS. STENT/COIL/FEM-POP    Schedule:  Approx 1230PM 8/14   Code status: Full Code   Labs Results from last 7 days   Lab Units 08/13/24  0958 08/09/24  1938   WBC K/uL 5.64 9.38   HEMOGLOBIN g/dL 10.3* 10.7*   HEMATOCRIT % 34.3* 36.7   PLATELETS K/uL 199 285     Results from last 7 days   Lab Units 08/13/24  0958 08/09/24  1938   SODIUM mEQ/L 141 140   POTASSIUM mEQ/L 3.8 3.6   CHLORIDE mEQ/L 106 106   CO2 mEQ/L 29 24   BUN mg/dL 23 18   CREATININE mg/dL 1.0 1.3*   GLUCOSE mg/dL 113* 97   CALCIUM mg/dL 9.4 9.5   MAGNESIUM mg/dL 1.6*  --      Magnesium replaced today 8/13   Coagulation studies  Anticoagulation  Antiplatelets     none   Blood None indicated   CXR/Imaging:  X-RAY CHEST 1 VIEW 08/10/2024    Narrative  CLINICAL HISTORY: volume   .    COMMENT: AP chest radiograph was obtained. Comparison is made with recent  studies.    There is ongoing diffuse vascular/interstitial prominence with cardiomegaly.  Pleural effusions are not excluded without gross change from prior. There is no  definite pneumothorax. Osseous degenerative changes are noted.    Impression  IMPRESSION: Ongoing interstitial thickening suggestive of pulmonary edema,  similar to prior.     EKG ECG 12 lead      ECG 12 lead         Diet NPO after midnight   Consent In pre-op bin   Allergies IV contrast: prep ordered  Allergies   Allergen Reactions    Aspirin Shortness of breath     Other reaction(s): Unknown  \"stop breathing\"      Ibuprofen Shortness of breath     Stop breathing    Iodine Shortness of breath     Other reaction(s): Unknown    Iodine And Iodide Containing Products Shortness of breath     ? rash    Morphine Shortness of breath      Reported wamrth of face, improved with benadryl and cold compresses after getting morphine as well as episode of " (subjective ) dyspnea, and thought she passed out - did have wamrth and erythema of face with mild edema R>L cheek notably on exam.     Penicillins Shortness of breath     Other reaction(s): Unknown    Sulfa (Sulfonamide Antibiotics) Angioedema    Clindamycin     Hydrochlorothiazide      Other reaction(s): Abdominal Pain   Slow heart rate    Oxycodone      Other reaction(s): Vomiting    Sulfamethoxazole-Trimethoprim      Other reaction(s): Vomiting, Weakness     Latex Rash      Periop abx Vancomycin on call to OR     Please page [phone] with any questions or concerns.    Gaetano Khalil MD

## 2024-08-13 NOTE — PLAN OF CARE
Plan of Care Review  Plan of Care Reviewed With: patient  Progress: no change  Outcome Evaluation: pt aaox4, c/o left sided flank, chest pain and left toe pain. unrelieved with current pain management - Dr Pastor aware. She is allergic to many pain medications. Heating pad and elevation of foot for some pain relief.

## 2024-08-13 NOTE — PROGRESS NOTES
Palliative Care Progress Note    Patient Name: Arielle Hammondstower  Patient MRN: [id number]  Date / Time: 8/13/2024 11:21 AM   Attending Physician: Simon Cano*    This is Hospital Day: 6      Assessment and Plan  Anxious appearance  Assessment & Plan  -anxiety likely related to fear of medication allergies    Plan  -agree with trial of lorazepam 0.25 mg PO x 1, monitor for response  -possible Psych evaluation given medical complexity    Other constipation  Assessment & Plan  -LBM : 8/12  -Constipation likely multifactorial r/t current hospitalization, heart failure on diuretic therapy, debility related to multiple medical comorbid conditions  -CT abdomen 8/9 with moderate colonic stool burden       Plan:  - 1 packet Miralax BID  - Senokot x2 tabs PO, BID, Scheduled - hold for diarrhea  - Bisacodyl 10mg HI, qDay, PRN - if no BM in >2 days  - Monitor bowel patterns and adjust bowel regimen PRN.      Pain  Assessment & Plan  - Chronic pain attributed to: chronic chest pain, scleroderma c/b pulmonary HTN/ILD, Raynaud's Disease  - Pain worsened after bronchoscopy  - Patient with numerous allergies to various pain medications (opioids/NSAIDS)  - PDMP queried: no recent scripts  - Home regimen: lidocaine patch  - Medications available inpatient: Acetaminophen 975 mg p.o. every 6 hours, lidocaine patches, cyclobenzaprine 5 mg p.o. 3 times daily as needed  - Tramadol d/c'd 2/2 dizziness  - lyrica d/c'd 2/2 blurred vision    Plan:  - Per Pain Management rec's  - Would establish care with a pain management provider  - heating pad      Palliative care by specialist  Assessment & Plan  63 y.o. female with a PMH significant for severe scleroderma complicated by pulmonary hypertension, ILD.  She presented to [facility] on 8/8 for planned robotic navigational and EBUS bronchoscopies to evaluate growing LLL nodule c/b chest pain and volume overload.     - Code status: Full Code  - Prognosis:  High risk for acute  "deterioration and decline  - Capacity for Medical Decision Making: intact  - Advance Directives: No  - Goals of care: Goals are Life-prolonging/disease directed.  Refer to ACP note dated 8/10.  - Surrogate Decision Maker: Patient identifies her 2 adult children, Tiffanie and Wagner, as joint surrogate decision makers        Debility  Assessment & Plan  - Debility likely multifactorial in the setting of multiple chronic med conditions including scleroderma and CHF with current hospitalization  - Living situation prior to hospitalization: Lives at home with 24/7 aides  - Baseline functional status is: Ambulatory short distances  - Current Palliative Performance Status: 30%  - Baseline Palliative Performance Status:  50-60%    - Frailty   - Patient remains high risk for further deterioration and functional decline.    Plan  - Patient open to both palliative bridge and home based palliative NP visits            Interval History (Subjective)    Source:  Medical team, chart review, patient    ROS      Patient seen 1123-she reports her heart is \"beating too fast\", attributes it to the recent tramadol administration, concerned she is having a reaction.  Still with significant central chest pain unable to fully elaborate at this time, focused on her tachycardia.    Discussed case with KARYNA Hidalgo and bedside RN.      Current Medications:    acetaminophen, 975 mg, oral, q6h SPENCER    amLODIPine, 10 mg, oral, q AM    artificial tears, 1 drop, Both Eyes, QID    atorvastatin, 40 mg, oral, Daily (6p)    benzocaine-menthol, 1 lozenge, Mouth/Throat, q2h PRN    benzonatate, 100 mg, oral, 3x daily PRN    carboxymethylcellulose, 1 drop, Right Eye, 3x daily PRN    cyanocobalamin, 1,000 mcg, oral, Daily    cyclobenzaprine, 5 mg, oral, 3x daily PRN    glucose, 16-32 g of dextrose, oral, PRN **OR** dextrose, 15-30 g of dextrose, oral, PRN **OR** glucagon, 1 mg, intramuscular, PRN **OR** dextrose 50 % in water (D50), 25 mL, intravenous, PRN   " " diphenhydrAMINE, 50 mg, intravenous, Once    docusate sodium, 100 mg, oral, Daily PRN    ethacrynic acid, 50 mg, oral, Daily    honey, , Topical, Daily PRN    hydrocortisone sodium succinate, 200 mg, intravenous, q6h INT    levothyroxine, 100 mcg, oral, Daily    lidocaine, 1 patch, Topical, Daily    lidocaine, 1 patch, Topical, Daily    lidocaine, 1 patch, Topical, Daily    macitentan, 10 mg, oral, Daily    melatonin, 3 mg, oral, Nightly PRN    pantoprazole, 20 mg, oral, Daily    artificial tears, 1 drop, Both Eyes, q4h while awake    polyethylene glycol, 17 g, oral, BID    polyethylene glycol, 17 g, oral, 2x daily PRN    senna, 2 tablet, oral, BID    sildenafiL (pulm.hypertension), 20 mg, oral, TID    sodium chloride, 2 spray, Each Nostril, Daily    white petrolatum, , Topical, q4h PRN    white petrolatum, , Topical, BID    white petrolatum-mineral oil, , Both Eyes, Nightly    ethacrynic acid    Objective    Physical Exam:  General:   Distressed, chronically ill appearing, frail  Eyes:  Sclera Anicteric  ENMT:  Mucus Membranes Moist  CV:  tachycardic  Resp:  nonlabored  Psych:  anxious   Neuro:  Awake and Alert  Skin:  warm and dry  Musculoskeletal:  no edema      Vitals:  Vitals:    08/13/24 0730   BP: 139/65   Pulse: 76   Resp: 18   Temp: 36.7 °C (98 °F)   SpO2: 99%       Intake & Output:  I/O last 3 completed shifts:  In: 318 [P.O.:318]  Out: -         Laboratory Studies:  CBC Results      Results from last 7 days   Lab Units 08/13/24 0958 08/09/24  1938   WBC K/uL 5.64 9.38   HEMOGLOBIN g/dL 10.3* 10.7*   HEMATOCRIT % 34.3* 36.7   PLATELETS K/uL 199 285            CMP Results      Results from last 7 days   Lab Units 08/13/24 0958 08/09/24  1938   SODIUM mEQ/L 141 140   POTASSIUM mEQ/L 3.8 3.6   CHLORIDE mEQ/L 106 106   CO2 mEQ/L 29 24   BUN mg/dL 23 18   CREATININE mg/dL 1.0 1.3*   CALCIUM mg/dL 9.4 9.5   GLUCOSE mg/dL 113* 97          Troponin I Results      No results found for: \"TROPONINT\"       ABG " Results                 Imaging and Other Studies:     Radiology Imaging    XR CHEST 1 VW    Narrative  CLINICAL HISTORY: volume   .    COMMENT: AP chest radiograph was obtained. Comparison is made with recent  studies.    There is ongoing diffuse vascular/interstitial prominence with cardiomegaly.  Pleural effusions are not excluded without gross change from prior. There is no  definite pneumothorax. Osseous degenerative changes are noted.    Impression  IMPRESSION: Ongoing interstitial thickening suggestive of pulmonary edema,  similar to prior.                                                                      Cardiac Imaging    TRANSTHORACIC ECHO (TTE) LIMITED 07/19/2024    Interpretation Summary  Rhythm is sinus tachycardia.    Mild increased wall thickness with normal left ventricular cavity and preserved systolic function. EF 65%. No wall motion abnormalities.  Normal right ventricular size with preserved systolic function.  IVC normal in size with >50% respiratory variation consistent with normal right sided filling pressures.  Small pericardial effusion. No evidence of hemodynamic compromise with normal respiratory variation and no chamber collapse. Prominent epicardial fat.  Compared with previous study of 5/23/2024, small pericardial effusion persists.         I have independently reviewed the pertinent imaging to the time of note and agree with reported results. CXR reviewed- no ICD noted 8/10/24      I have independently reviewed the pertinent cardiac studies to the time of note and agree with reported results. EKG reviewed, QTC: 473 on 8/13/24    Labs reviewed:  Hgb 10.3-anemia        Discussed with: Medical Team, RN         KARYNA Arzola  Palliative Care Nurse Practitioner  [facility]  [phone] [facility]  [phone] [facility]  [address] [place] [address]  Pager [phone]

## 2024-08-13 NOTE — PROGRESS NOTES
Cardiology/pulmonary hypertension service progress note    Interval History:    Continues to have left-sided chest tenderness, improved somewhat with lidocaine patch.  Some dyspnea.  Noted edema at the level of the hips.  Discussed giving extra dose of diuretic today.  She is in agreement.  There is a bilateral lower extremity angiogram planned for tomorrow with vascular surgery.      Background per Dr. Arvizu:    Ms. Felix is a 63 y.o. female with a past medical history of Pulmonary HTN, HTN, Raynaud's Disease, Cutaneous Systemic Scleroderma (dx 1998), ILD, PE (3/2023). She is a patient of Dr. Nguyễn. She was previously followed by Dr. Jos Valdez at [facility]. Echocardiogram from 4/21/2022 showed LVEF: 55-60%, mild aortic valve thickening, pulmonary artery systolic pressure: 52 mmHg and trivial pericardial effusion. LHC and RHC (separate occasions) over the last 5-10 years which showed normal hemodynamics and normal coronaries. She had a RHC 9/02/2020 showing top-normal right sided filling pressures with mild pulmonary hypertension, top-normal PVR and normal pulmonary capillary wedge pressure. The cardiac index was normal, seen below.     On 6/27/2022, she was in [facility] [place] [facility] for chest pain. EKG: NSR without ischemic changes. hsTrop: 12->16, d-dimer: 0.56, CXR showed cardiomegaly and diffuse interstitial prominence.     Dr. Nguyễn ordered echocardiogram, which was completed on 8/22/2022 and showed estimated RVSP = 50-55 mmHg, normal-sized LV with normal LV systolic function. Estimated EF 55- 60%. Wall motion grossly normal, mild concentric left ventricular hypertrophy, grade I LV diastolic dysfunction, probably normal RV size and function.    At her initial visit on 10/11/2022, she reported that she felt very short of breath with minimal activity most of the time. She reports that this started about 1 year prior and had progressively gotten worse. She described PND and bendopnea. She reported that she experienced  ankle swelling, worsened over the past year and usually worse towards the end of the night. She also reported some swelling in her abdomen. She reported daily palpations. I recommended a RHC which was done 11/28/2022 and showed: Normal right sided filling pressures. Mildly elevated left sided filling pressures. Precapillary pulmonary hypertension with mean PA 36 mmHg.    She was hospitalized on 3/19/2023 at [facility] for PE diagnosed on V/Q scan. She reports that she had presented with left arm pain and heaviness. She states that she was started on Apixaban. One week after her last office visit (4/6/2023), she presented to [facility] with chest pain, epistaxis and hemoptysis. Echocardiogram showed: left ventricular cavity size normal with mild left ventricular hypertrophy and preserved systolic function. Estimated EF 65%. Chest CTA showed no evidence of PE; Apixaban was discontinued.     She had a repeat echocardiogram (6/2023) which showed: Normal left ventricular cavity size and mild concentric left ventricular hypertrophy. Normal systolic function. EF: 60%. Normal right ventricular structure and function. Mild tricuspid valve regurgitation with estimated RVSP: 55 mmHg. Compared to previous study from 4/14/2023, estimated right ventricular systolic pressure were higher.    She underwent V/Q scan in September 2023, which showed intermediate-to-high probability of pulmonary embolic disease, as a result she completed CTA Chest PE which showed: no evidence for filling defect or vessel cutoff to suggest pulmonary embolism. Enlargement of the pulmonary arteries compatible with pulmonary arterial hypertension was seen.    She reports she started Macitentan around August or September 2023. She stated that she had not been checking her SpO2. She reports she did not notice any difference since starting it. She reported she had been feeling more shortness of breath at times with ADLs.     She presented to [facility] on  3/3/2024 with chest pressure, palpitations and nausea. She was noted to have hypoxia and tachypnea at presentation. She was found to have moderate-to-large pericardial effusion without tamponade. She was started on Colchicine 0.6 mg BID on 3/4/2024 and when she did not improve symptomatically, Prednisone 20 mg daily was added on 3/5/2024. On 3/7/2024, she developed multiple episodes of diarrhea and Colchicine dose was decreased to 0.3 mg. Coronary CTA, done as part of the ischemic workup, showed moderate 2 vessel CAD. She was recommended to begin Atorvastatin 20 mg daily and Clopidogrel 75 mg daily. She initially refused those two medications, but eventually agreed to take Atorvastatin.      She was discharged on Colchicine 0.3 mg for at least 3 months and Prednisone 20 mg for a total two week course until 3/19/2024, then decrease the dose to 10 mg daily for 2 weeks until 4/2/2024, then decrease dose to 5 mg daily for 2 weeks until 4/16/2024. She reports she tapered off Prednisone as instructed and has continued to take Colchicine as prescribed. She completed an echocardiogram (4/10/2024) which showed: Small-to-moderate sized, circumferential pericardial effusion. No echocardiographic evidence for cardiac tamponade. Compared to previous study from 3/4/2024, no significant change. Pericardial effusion size was similar, estimated right atrial pressure lower.      She presented to [facility] (4/16/2024) with increased pleuritic chest pain. On presentation to the ER, she was noted to be hypertensive and tachycardic. Labs showed mildly elevated troponin, elevated BNP and leukocytosis. CT angiography of the chest did not show evidence of PE. It was read as large pericardial effusion, evidence of emphysema, evidence of pulmonary hypertension, 16 mm left lobe lung mass.       She presented again to [facility] on 5/21/2024 at the request of her Rheumatologist. During her visit there, she was noted to be tachycardic and with some shortness  "of breath. Her chest pain was improved from her prior admission. On arrival, O2 saturations were normal. ECG showed: Sinus Tachycardia (HR: 125 bpm). BNP >300, hsTrop 32 ->24. POCUS in the emergency department showed evidence of pericardial effusion with no tamponade physiology. CTA Chest did not show evidence of pulmonary embolism. There were bilateral changes radiographically concerning for volume overload. She received Methylpredinsolone 125 mg in ED. Of note, she was on Prednisone 10 mg daily at home. She was given IV diuresis. She was discharged on Furosemide 20 mg PO PRN daily for fluid weight gain / swelling.     She had brief hospitalization and was discharged on 7/23 for chest pain. She presented to the ED on 7/25/2024 for worsening of her chronic chest pain and 8/5/2024 for facial swelling and chronic chest pain.      She underwent bronchoscopic lung biopsy yesterday, 8/8/2024, with Dr. Niles Rodriguez. Pathology showed atypical glandular cells which may be consistent with adenocarcinoma in situ.     Post-procedure CXR showed increase pulmonary vascular congestion. She was admitted for optimization.       ---    Rheumatology: Dr. Trevizo  Pulmonology: Dr. Wasserman    Past Medical / Surgical History:  Pulmonary HTN, HTN, Raynaud's Disease, Cutaneous Systemic Scleroderma (dx 1998), ILD, PE (3/2023), Follicular Carcinoma of Thyroid, Tenosynovitis, de Quervain, h/o Hernia Repair, h/o Appendicitis, h/o Digit Amputation, H/o Total Thyroidectomy (Dr. Pacheco at [facility]; 4/2013)    Family & Social History: She is , she has two children. She has worked as an .     Tobacco: former 2005  EtOH: never   Illicits: never     Her brother has CAD with stent  1st cousin with SLE  Both parents had \"heart problems\"    Allergies:   Allergies   Allergen Reactions    Aspirin Shortness of breath     Other reaction(s): Unknown  \"stop breathing\"      Ibuprofen Shortness of breath     Stop breathing    Iodine Shortness of " breath     Other reaction(s): Unknown    Iodine And Iodide Containing Products Shortness of breath     ? rash    Morphine Shortness of breath      Reported wamrth of face, improved with benadryl and cold compresses after getting morphine as well as episode of (subjective ) dyspnea, and thought she passed out - did have wamrth and erythema of face with mild edema R>L cheek notably on exam.     Penicillins Shortness of breath     Other reaction(s): Unknown    Sulfa (Sulfonamide Antibiotics) Angioedema    Clindamycin     Hydrochlorothiazide      Other reaction(s): Abdominal Pain   Slow heart rate    Oxycodone      Other reaction(s): Vomiting    Sulfamethoxazole-Trimethoprim      Other reaction(s): Vomiting, Weakness     Latex Rash       Medications:   Current Facility-Administered Medications   Medication Dose Route Frequency Provider Last Rate Last Admin    acetaminophen (TYLENOL) tablet 975 mg  975 mg oral q6h [facility] Molly Hidalgo CRNP   975 mg at 08/13/24 0901    amLODIPine (NORVASC) tablet 10 mg  10 mg oral q AM Joel Rodriguez MD   10 mg at 08/13/24 0900    artificial tears (dextran-hpm-glyc) (GENTEAL TEARS) 0.1-0.3-0.2 % eye drops 1 drop  1 drop Both Eyes QID Grant Watts DO   1 drop at 08/13/24 0908    atorvastatin (LIPITOR) tablet 40 mg  40 mg oral Daily (6p) Joel Rodriguez MD        benzocaine-menthol (CEPACOL/CHLORASEPTIC) lozenge 1 lozenge  1 lozenge Mouth/Throat q2h PRN Grant Watts DO   1 lozenge at 08/12/24 2243    benzonatate (TESSALON) capsule 100 mg  100 mg oral 3x daily PRN Joel Rodriguez MD   100 mg at 08/12/24 0920    carboxymethylcellulose (REFRESH PLUS) 0.5 % ophthalmic dropperette 1 drop  1 drop Right Eye 3x daily PRN Neela Bender DO        cyanocobalamin (VITAMIN B12) tablet 1,000 mcg  1,000 mcg oral Daily Joel Rodriguez MD   1,000 mcg at 08/13/24 0900    cyclobenzaprine (FLEXERIL) tablet 5 mg  5 mg oral 3x daily PRN Grant Watts DO   5 mg at 08/13/24 0010    glucose chewable  tablet 16-32 g of dextrose  16-32 g of dextrose oral PRN Joel Rodriguez MD        Or    dextrose 40 % oral gel 15-30 g of dextrose  15-30 g of dextrose oral PRN Joel Rodriguez MD        Or    glucagon (GLUCAGEN) injection 1 mg  1 mg intramuscular PRN Joel Rodriguez MD        Or    dextrose 50 % in water (D50) injection 12.5 g  25 mL intravenous PRN Joel Rodriguez MD        diphenhydrAMINE (BENADRYL) injection 50 mg  50 mg intravenous Once Kush Henao MD        docusate sodium (COLACE) capsule 100 mg  100 mg oral Daily PRN Joel Rodriguez MD        ethacrynic acid (EDECRIN) tablet 50 mg  50 mg oral Daily Grant Watts DO   50 mg at 08/13/24 0925    honey (MEDIHONEY) 100 % topical paste   Topical Daily PRN Nelson Pratt DPM        hydrocortisone sod succ (Solu-CORTEF) injection 200 mg  200 mg intravenous q6h INT Kush Henao MD        levothyroxine (SYNTHROID) tablet 100 mcg  100 mcg oral Daily Joel Rodriguez MD   100 mcg at 08/13/24 0900    lidocaine (ASPERCREME) 4 % topical patch 1 patch  1 patch Topical Daily Joel Rodriguez MD   1 patch at 08/13/24 0906    lidocaine (ASPERCREME) 4 % topical patch 1 patch  1 patch Topical Daily Neela Bender DO   1 patch at 08/13/24 0906    lidocaine (ASPERCREME) 4 % topical patch 1 patch  1 patch Topical Daily Amira Logan MD   1 patch at 08/13/24 0906    macitentan (OPSUMIT) tablet 10 mg (Patient Owned)  10 mg oral Daily Grant Watts DO   10 mg at 08/13/24 0925    melatonin ODT 3 mg  3 mg oral Nightly PRN Arleth Pastor CRNP   3 mg at 08/13/24 0054    pantoprazole (PROTONIX) tablet,delayed release (DR/EC) 20 mg  20 mg oral Daily Joel Rodriguez MD   20 mg at 08/13/24 0900    peg 400-hypromellose-glycerin (ARTIFICIAL TEARS) 1-0.2-0.2 % eye drops 1 drop  1 drop Both Eyes q4h while awake Jerson Summers DO   1 drop at 08/13/24 0910    polyethylene glycol (MIRALAX) 17 gram packet 17 g  17 g oral BID Corinne Ortez DO   17 g at  08/11/24 2126    polyethylene glycol (MIRALAX) 17 gram packet 17 g  17 g oral 2x daily PRN Teri Castañeda MD        senna (SENOKOT) tablet 2 tablet  2 tablet oral BID Teri Castañeda MD   2 tablet at 08/13/24 0901    sildenafiL (pulm.hypertension) (REVATIO) tablet 20 mg  20 mg oral TID Joel Rodriguez MD   20 mg at 08/13/24 0901    sodium chloride (OCEAN) 0.65 % nasal spray 2 spray  2 spray Each Nostril Daily Joel Rodriguez MD   2 spray at 08/13/24 0909    traMADoL (ULTRAM) tablet 25 mg  25 mg oral Once Molly Hidalgo CRNP        white petrolatum (AQUAPHOR) ointment   Topical q4h PRN Molly Hidalgo CRNP   Given at 08/09/24 1224    white petrolatum (AQUAPHOR) ointment   Topical BID Grant Watts DO   Given at 08/13/24 0908    white petrolatum-mineral oil (ARTIFICIAL TEARS OINT) ophthalmic ointment   Both Eyes Nightly Jerson Summers DO           Review of Systems:   All other systems reviewed and are negative except as per HPI.    Physical Exam:     Wt Readings from Last 10 Encounters:   08/08/24 50.8 kg (112 lb)   08/05/24 49.4 kg (109 lb)   08/05/24 49.4 kg (109 lb)   07/20/24 49.5 kg (109 lb 2 oz)   07/19/24 51.3 kg (113 lb)   06/20/24 51.7 kg (114 lb)   06/14/24 52.6 kg (116 lb)   05/23/24 52.4 kg (115 lb 9.6 oz)   04/17/24 53.5 kg (118 lb)   04/10/24 44.5 kg (98 lb)       BP Readings from Last 5 Encounters:   08/13/24 139/65   08/05/24 (!) 148/75   07/25/24 (!) 155/77   07/23/24 (!) 101/57   07/19/24 (!) 154/80       Vitals:    08/13/24 0730   BP: 139/65   Pulse: 76   Resp: 18   Temp: 36.7 °C (98 °F)   SpO2: 99%       Constitutional: NAD, AAOx3  HENT: Normocephalic, atraumatic head, sclerae anicteric, no cervical lymphadenopathy, trachea midline, facial swelling  Cardiovascular: RRR, no murmurs, rubs or gallops, JVP: 10 cm H2O   cm H2O, no carotid bruits.  Respiratory: CTA bilateral lung fields, no wheezes, rales or rhonchi  GI: soft, non-tender/non-distended  Musculoskeletal / Extremities: Bilateral hip edema  noted, +2 pulses bilateral radial, DP/PT arteries, skin tightening on face and fingertips, ulceration and discoloration of 2nd phalange b/l LE.   Skin: no rashes. Facial changes c/w scleroderma  Neuro / Psych: no focal deficits, CNII-XII grossly intact, appropriate, cooperative    Diagnostic Data:     Component      Latest Ref Rng 7/20/2024 7/25/2024   Sodium      136 - 145 mEQ/L 142  139    Potassium, Bld      3.5 - 5.1 mEQ/L 4.2  4.3    Chloride      98 - 107 mEQ/L 107  103    CO2      21 - 31 mEQ/L 25  27    BUN      7 - 25 mg/dL 17  17    Creatinine      0.6 - 1.2 mg/dL 1.2  1.1    Glucose      70 - 99 mg/dL 85  86    Calcium      8.6 - 10.3 mg/dL 9.3  10.6 (H)    AST (SGOT)      13 - 39 IU/L  15    ALT (SGPT)      7 - 52 IU/L  8    Alkaline Phosphatase      34 - 125 IU/L  82    Total Protein      6.0 - 8.2 g/dL  8.8 (H)    Albumin      3.5 - 5.7 g/dL  4.3    Bilirubin, Total      0.3 - 1.2 mg/dL  0.4    eGFR      >=60.0 mL/min/1.73m*2 51.0 (L)  56.6 (L)    Anion Gap      3 - 15 mEQ/L 10  9       Component      Latest Ref Rng 7/20/2024 7/25/2024   WBC      3.80 - 10.50 K/uL 7.27  10.29    Hemoglobin      11.8 - 15.7 g/dL 9.8 (L)  11.2 (L)    Hematocrit      35.0 - 45.0 % 32.0 (L)  37.3    MCV      83.0 - 98.0 fL 80.4 (L)  80.4 (L)    Platelets      150 - 369 K/uL 206  331       Component      Latest Ref Rng 7/19/2024 7/25/2024 8/9/2024   High Sens Troponin I      <15.0 pg/mL 89.2 (HH)  66.8 (HH)  92.5 (HH)    High Sens Troponin I       90.3 (HH)  83.4 (HH)         Component      Latest Ref Rng 5/22/2024 6/22/2024 7/19/2024 7/25/2024   BNP      <=100 pg/mL 352 (H)  150 (H)  151 (H)  220 (H)       Component      Latest Ref Rng 4/16/2024 5/23/2024 7/21/2024 7/22/2024   Sed Rate      0 - 30 mm/hr 101 (H)  79 (H)  105 (H)  >119 (H)       Component      Latest Ref Rng 4/18/2024 5/24/2024   WBC      3.80 - 10.50 K/uL 8.40  16.22 (H)    RBC      3.93 - 5.22 M/uL 3.54 (L)  3.82 (L)    Hemoglobin      11.8 - 15.7 g/dL 9.8  (L)  10.3 (L)    Hematocrit      35.0 - 45.0 % 32.8 (L)  33.9 (L)    Platelets      150 - 369 K/uL 254  289       Component      Latest [facility] 5/24/2024   Magnesium      1.8 - 2.5 mg/dL 2.1      Component      Latest [facility] 4/13/2023 3/7/2024 5/22/2024   TSH      0.34 - 5.60 mIU/L 4.71  0.28 (L)  1.03      Component      Latest [facility] 5/24/2024   Sodium      136 - 145 mEQ/L 138    Potassium, Bld      3.5 - 5.1 mEQ/L 4.4    Chloride      98 - 107 mEQ/L 104    CO2      21 - 31 mEQ/L 24    BUN      7 - 25 mg/dL 40 (H)    Creatinine      0.6 - 1.2 mg/dL 1.1    Glucose      70 - 99 mg/dL 87    Calcium      8.6 - 10.3 mg/dL 9.5    eGFR      >=60.0 mL/min/1.73m*2 56.6 (L)    Anion Gap      3 - 15 mEQ/L 10      Component      Latest [facility] 4/13/2023   Hemoglobin A1C      <5.7 % 4.4        Component      Latest [facility] 4/14/2023   Triglycerides      30 - 149 mg/dL 44    Cholesterol      <=200 mg/dL 194    HDL      >=55 mg/dL 49 (L)    LDL Calculated      <=100 mg/dL 136 (H)    Non-HDL, Calculated      mg/dL 145    RISK      <=5.0  4.0       Component      Latest [facility] 4/13/2023   High Sens Troponin I      <15.0 pg/mL 23.0 (H)       Component      Latest [facility] 4/14/2023   Ferritin      11 - 250 ng/mL 75      Component      Latest [facility] 4/14/2023   Iron      35 - 150 ug/dL 29 (L)    TIBC      270 - 460 ug/dL 245 (L)    UIBC      180 - 360 ug/dL 216    Iron Saturation      15 - 45 % 12 (L)        Component      Latest [facility] 6/27/2022 4/13/2023   BNP      <=100 pg/mL 111 (H)  105 (H)                          ---    ECG (8/2/2024, personally reviewed):   Sinus tachycardia   Left ventricular hypertrophy with repolarization abnormality    HR: 117 bpm     ---        Lower extremity arterial duplex and ENRRIQUE August 10, 2024:  Right ankle ENRRIQUE (DP): 0.74.  Right ankle ENRRIQUE (PT): 0.88.  Left ankle ENRRIQUE (DP): 0.90.  Left ankle ENRRIQUE (PT): 0.62.    Findings concerning for hemodynamically significant stenosis within the  bilateral  popliteal arteries and distal left femoral artery. There are also  findings which can be seen with inflow stenosis at the level left common femoral  artery. CTA of the abdomen and pelvis with lower extremity runoff could be  performed for further evaluation if clinically indicated.    CT Chest / Abdomen / Pelvis w/ Contrast (7/25/2024, personally reviewed):     Lung bases: Left-sided pleural effusion, left parenchymal consolidation, and  findings of interstitial lung disease.  More nodular density in the medial  aspect of the left lower lobe measuring 1.6 x 1.9 cm.  Cardiomegaly with a  pericardial effusion.     Liver: The liver is normal in size.  There is no focal mass.  Hepatic veins and  portal veins are patent without focal filling defects to suggest thrombosis..     Gallbladder:  Small dependent gallstones.  No gallbladder wall thickening..     Spleen: Spleen is normal in size without focal mass lesion..     Pancreas: The pancreas is normal without focal mass, parenchymal atrophy, or  pancreatic duct dilation..     Adrenals: The adrenals are normal bilaterally without focal mass..     Kidneys, ureters and bladder:  Symmetric enhancement and excretion..  Left lower  pole cystic lesion measures 3.2 cm in maximal dimension.  This measures greater  than simple fluid density.  This measured 60 Hounsfield units on the noncontrast  CT from 2020.  It measures 36 Hounsfield units on today's study.  Therefore it  is most in keeping with a hemorrhagic/proteinaceous cyst.  Additional  hypodensities too small to characterize.     Retroperitoneal structures: There is no abdominal aortic aneurysm.  Atherosclerotic calcification.  There is no retroperitoneal adenopathy or  retroperitoneal mass..     Bowel and mesentery: No evidence of a focal inflammatory or obstructive process.  Moderate fecal retention throughout the colon.  There are a few fluid-filled  small bowel loops in the pelvis, nonspecific.     Lymph nodes: No  pathologic lymphadenopathy..     Pelvis: The uterus is normal in size.  The ovaries are normal.  There is no  adnexal mass or pelvic free fluid.     Bones: Within normal limits.    No evidence for pulmonary embolism.  Persistent left-sided pleural effusion and airspace disease at the left lung  base.  Underlying interstitial lung disease.     TTE (7/19/2024, personally reviewed):   Mild increased wall thickness with normal left ventricular cavity and preserved systolic function. EF 65%. No wall motion abnormalities.   Normal right ventricular size with preserved systolic function.   IVC normal in size with >50% respiratory variation consistent with normal right sided filling pressures.   Small pericardial effusion. No evidence of hemodynamic compromise with normal respiratory variation and no chamber collapse. Prominent epicardial fat.   Compared with previous study of 5/23/2024, small pericardial effusion persists.      PET/CT Skull-to-Thigh (6/4/2024):   An approximately 1.6 cm pulmonary nodule at the medial left lung base is  significantly FDG avid at max SUV 8.5.  This is nonspecific and can be seen in  the setting of benign pathology however malignancy is a concern.     There is more mild to moderate nonspecific uptake localizing to lita-fissural  nodules particularly on the left.     There is  moderate nonspecific left hilar and mediastinal dawson uptake.     No additional suspicious FDG avid findings appreciated.     CTA Chest (5/21/2024):   IMPRESSION:  1.  No evidence of acute pulmonary embolism.  2.  Multiple new perifissural left lower lobe nodules, suspicious for a  neoplastic process. PET/CT and or tissue sampling is recommended.  3.  Chronic interstitial lung disease in an NSIP pattern, with overall slightly  progressed appearance since March 2023. Pulmonary consultation is recommended.  4.  Stable cardiomegaly and large pericardial effusion.  5.  Mediastinal and supraclavicular adenopathy, similar to  prior study, also  indeterminate, which could be further evaluated at time of PET/CT.     CTA Chest (4/16/2024):   IMPRESSION:  1. No evidence of pulmonary embolism.  2. Stable cardiomegaly and large pericardial effusion. Cardiology consultation  suggested.  3. Emphysema with bilateral lower lobe groundglass opacity and bronchiectasis in  an NSIP pattern, which can be seen with scleroderma. Pulmonary consultation  suggested.  4. Stable 16 mm left lower lobe masslike density which may represent  atelectasis, lung cancer or lymphoma. When clinically appropriate PET/CT  suggested.  5. Stable prominence of the main pulmonary artery which can be seen with  pulmonary hypertension     TTE (5/23/2024, personally reviewed):  Mild increased wall thickness with normal left ventricular cavity and preserved systolic function. EF 65%. No wall motion abnormalities.   Normal right ventricular size with preserved systolic function.   Tiny IVC with >50% respiratory variation consistent with low-normal right sided filling pressures.   Small pericardial effusion, predominantly adjacent to right atrium. No evidence of hemodynamic compromise with normal respiratory variation and no chamber collapse. Prominent epicardial fat.   Compared with previous study of 4/10/24, small pericardial effusion persists.      TTE (4/10/2024, personally reviewed):  1. Normal left ventricular cavity size with mild concentric left ventricular hypertrophy. Normal systolic function. EF: 60%. No regional wall motion abnormalities. Cannot determine diastolic function. Global longitudinal strain not reported due to technical limitations of study.   2. Aortic valve has three cusps. Trace aortic regurgitation. Aortic valve and root sclerosis.  3. Thickened mitral valve leaflets. Trace mitral valve regurgitation. Normal left atrial size.   4. Normal right ventricular structure and function. Trace tricuspid valve regurgitation with estimated RVSP: 51 mmHg (assuming  right atrial pressure of 3 mmHg). Normal right atrial size. Pulmonic valve structurally normal. Trace pulmonic valve regurgitation. Pulsed wave Doppler of the RVOT systolic profile shows decreased acceleration times and geometry consistent with mildly elevated PVR.  5. IVC size is normal with > 50% inspiratory collapse consistent with normal right atrial pressure. Small-to-moderate sized, circumferential pericardial effusion. No echocardiographic evidence for cardiac tamponade.   6. Compared to previous study from 3/4/2024, no significant change. Pericardial effusion size is similar, estimated right atrial pressure lower.           CTA Abd / Pelvis (3/13/2024, personally reviewed):   There is dilatation of the of proximal/mid small bowel with relatively abrupt  transition left hemiabdomen, likely obstruction. Questionable lesion at the  transition point. This could be better evaluated with CT or MRI enterography.     Aorta and major branches patent. Superior mesenteric artery patent without  significant stenosis.  Suspect moderate stenosis of the origins of the of renal arteries bilaterally.     Large pericardial effusion, no change.     Patchy opacities lower lungs, similar to prior.      Coronary CTA (3/11/2024):   1. Two-vessel coronary artery disease as above that is moderate in severity.  CT  FFR is normal..  2. There is mitral annular calcification and aortic sclerosis.  The right atrium  is enlarged.  There is left ventricular hypertrophy.  There is a moderate to  large circumferential pericardial effusion.  3. Normal left ventricular wall motion and systolic function.  4. Coronary calcium score 313, 96th percentile     TTE (3/4/2024, personally reviewed):   Normal-sized left ventricle. Mild left ventricular hypertrophy. Preserved systolic function. LVEF 60%. No regional wall motion abnormalities. Grade II diastolic dysfunction.  Normal global longitudinal strain (-20%).  The aortic valve is not well seen.   Cannot determine the number of cusps.  Sclerotic leaflets.  No aortic stenosis.  Trace aortic insufficiency.  Normal sized aortic root.  The visualized portion of the ascending aorta is normal in size.  The mitral valve leaflets are thickened. Mild mitral annular calcification. Trace mitral regurgitation.   Mildly dilated left atrium.  Normal-sized right ventricle. Normal right ventricular systolic function.  Thickened tricuspid valve. There is mild tricuspid regurgitation with estimated RVSP of 46 mmHg.   Pulmonic valve is not well visualized. Trace pulmonic regurgitation.   Mildly dilated right atrium.  IVC is enlarged with <50% respiratory variation consistent with elevated right atrial filling pressure (at least 15 mmHg).   Moderate, circumferential, circumferential pericardial effusion.  The largest pocket is located inferolaterally.  Elevated right atrial pressure.  No other clear echocardiographic features of increased pericardial pressure.  Correlate clinically.   Compared to previous TTE from 6/21/2023, pericardial effusion now moderate in size.  Right atrial pressure now elevated.         CTA Chest (3/3/2024):   IMPRESSION:  1.  No evidence of acute pulmonary embolism to the level of the segmental  branches.  2.  Moderate to large pericardial effusion measuring higher than simple fluid  density.  3.  Lower lobe lung field predominant interstitial thickening with some relative  subpleural sparing, consistent with a chronic interstitial lung disease,  unchanged from the prior study.  4.  Continued bilateral axillary, mediastinal, and bilateral hilar  lymphadenopathy, not definitely changed from the prior study, possibly related  to the patient's sarcoid.  5.  Emphysema.      CTA Chest PE (10/4/2023):  IMPRESSION:  1. No evidence for filling defect or vessel cutoff to suggest pulmonary  embolism.  Enlargement of the pulmonary arteries compatible with pulmonary  arterial hypertension.  2. Changes throughout  the lungs as described above which can be seen with  systemic sclerosis/scleroderma.  Underlying pneumonia the lung bases cannot be  entirely excluded in the proper clinical setting.  3.  Additional stable findings as described above.        VQ (9/12/2023):  IMPRESSION:  Intermediate to high probability of pulmonary embolic disease.     6MWT (7/25/2023, on Sildenafil 20 mg TID):        TTE (6/21/2023, personally reviewed):  1. Normal left ventricular cavity size and mild concentric left ventricular hypertrophy. Normal systolic function. EF: 60%. No regional wall motion abnormalities. Grade I diastolic dysfunction.   2. Aortic valve cusps not well visualized. Trace aortic regurgitation. Aortic valve and root sclerosis.  3. Thickened mitral valve leaflets. Mild mitral annular calcification. Trace mitral regurgitation. Mildly dilated left atrial size.   4. Normal right ventricular structure and function. Mild tricuspid valve regurgitation with estimated RVSP: 55 mmHg (assuming right atrial pressure of 3 mmHg). Normal right atrial size. Pulmonic valve not well visualized. Trace pulmonic valve regurgitation. Pulsed wave Doppler of the RVOT systolic profile show decreased acceleration times ( msec) and late systolic notching consistent with elevated PVR.   5. IVC size is normal with > 50% inspiratory collapse consistent with normal right atrial pressure. Small pericardial effusion without echocardiographic evidence of tamponade.  6. Compared to previous study from 4/14/2023, estimated right ventricular systolic pressure is higher.        TTE (4/14/2023, personally reviewed):   The left ventricular cavity size is normal with mild left ventricular hypertrophy and preserved systolic function. Estimated EF 65%. No regional wall motion abnormalities. Grade I diastolic dysfunction.     The aortic valve is tricuspid. Aortic valve sclerosis. Trace aortic regurgitation.      The visualized portions of the aortic root and  ascending aorta are normal in size.     The mitral valve is mildly thickened. Mild mitral annular calcification. Trace mitral regurgitation.       The left atrium is severely dilated.      The right ventricle is normal in size with preserved systolic function     The pulmonic valve is not well seen.     The tricuspid valve is normal in appearance. mild tricuspid regurgitation with an estimated RVSP of 34 mmHg.       Right atrium is normal in size.     The IVC is small and collapses > 50% with inspiration consistent with normal right atrial pressures.     Small pericardial effusion, mostly posterior.       Compared to previous echocardiogram from 8/22/2022, there is no significant change        TTE (11/28/2022, personally reviewed):   Normal right- and mildly elevated left-sided filling pressures (RA: 3 mmHg, PCWP: 13 mmHg)  Elevated pulmonary artery pressures (60/22(35 mmHg)) in the setting of PCWP: 13 mmHg.   Normal cardiac output and index (CO: 5.1 L/min, CI: 3.2 L/min/m2)  PVR: 4.3 Wood units. SVR: 1840 dynes/sec/cm5      TTE (8/22/2022, personally reviewed):  Normal-sized LV. Normal LV systolic function. Estimated EF 55- 60%. Wall motion appears grossly normal. Mild concentric left ventricular hypertrophy. Grade I LV diastolic dysfunction.  Pulmonic valve not well visualized. Grossly normal pulmonic valve structure. Trace pulmonic valve regurgitation. No pulmonic valve stenosis.  Aortic valve not well visualized. Aortic valve not well seen but likely trileaflet. Focal aortic valve calcification present. Sclerotic aortic valve leaflets. Mild aortic valve regurgitation. No aortic valve stenosis.  RV not well visualized. Normal-sized RV. Normal RV systolic function.  Normal-sized RA.  There is a small loculated pericardial effusion anterior to the right atrium. No cardiac tamponade.  Sclerotic mitral valve. Mitral valve calcification of the anterior leaflet present.  Trace mitral valve regurgitation.  No  significant mitral valve stenosis.  Normal-sized IVC. IVC demonstrates normal respiratory collapse.  Tricuspid valve structure is grossly normal. Mild to moderate tricuspid valve regurgitation.  Estimated RVSP = 50-55 mmHg.  Mildly dilated LA.  No previous echocardiogram available for comparison.     TTE (4/21/2022, outside study):  This result has an attachment that is not available.     A complete transthoracic echocardiogram (including 2D, color flow   Doppler, spectral Doppler and M-mode imaging) was performed using the   standard protocol. The study quality was adequate.     The left ventricle is normal in size. There is moderately increased   wall thickness consistent with moderate concentric hypertrophy.   Endocardium is incompletely visualized, but no regional wall motion   abnormalities are noted in the visualized segments. Normal left   ventricular ejection fraction. The left ventricular ejection fraction by   visual estimate is 55% to 60%.     The right ventricle is normal in size. There is normal function of the   right ventricle.     The left atrium is normal in size. Left atrial volume is 58 mL.     The right atrial volume measures 52 mL. The right atrial volume index   measures 34 mL/m2.     There is trace mitral regurgitation. There is no mitral stenosis.     The aortic valve is trileaflet. There is mild aortic valve thickening.   There is no aortic stenosis. There is trace aortic regurgitation.     There is mild to moderate tricuspid regurgitation. Pulmonary artery   systolic pressure measures 52 mmHg. The pulmonary artery systolic pressure   is moderately elevated.     The aorta measures 3.2 cm at the sinus of Valsalva. The proximal   segment of the ascending aorta is mildly enlarged. The proximal segment of   the ascending aorta measures 3.4 cm. The proximal segment of the ascending   aorta measures 2.2 cm/m2, indexed for body surface area.     Trivial pericardial effusion present. There is no  echocardiographic   evidence of cardiac tamponade.     The inferior vena cava is normal in size (diameter <21 mm) and   decreases >50% in size with inspiration, suggesting a normal right atrial   pressure of 3 mmHg (range 0-5 mm Hg).     Compared to the prior study of 3/13/2020, there are no significant   changes.        PFT (3/23/2022):      PFT (7/14/2021):      CT Chest (5/2/2021, outside study):  CLINICAL INFORMATION: Systemic sclerosis. Interstitial lung disease. Groundglass nodule     PROCEDURE: Unenhanced CT of the chest was performed using thin section reconstructions. Images were obtained on inspiration and expiration.     COMPARISON: June and August 2020     FINDINGS:     LUNGS, PLEURA:     Groundglass opacities with reticulation with subpleural sparing in the lower lobes, without honeycombing or architectural distortion, unchanged.     Right upper lobe groundglass nodule, image 114 of series 3, 8 x 11 mm, previously 6 mm.     Expiratory images are of diagnostic quality and do not demonstrate evidence of clinically significant air trapping.     CARDIOVASCULAR, MEDIASTINUM, THYROID: Coronary calcifications. Trace pericardial effusion.     LYMPH NODES: Subcentimeter mediastinal lymph nodes, unchanged. Unchanged axillary lymph nodes.     SKELETON, CHEST WALL: No destructive bone lesion     UPPER ABDOMEN: Patulous esophagus. 3 mm right renal stone.       RHC (9/02/2020):  RA   8 mmHg   RV   40/5 mmHg   PA (mean)  44/16 (25) mmHg   PCWP   12 mmHg   CO   4.65 lpm (Thermodilution)   CI   2.65 lpm/m-squared   SVI    33 ml/beat/m-squared (lower limit normal 29)   PVR   2.8 mmHg/l/min (Wood units)   SVR   2064 dyne-sec-cm-5         PFT (3/18/2020):      TTE (3/13/2020, outside study):  · The study quality was good.   · The left ventricle is normal in size. Top normal left ventricular wall   thickness. There are no segmental wall motion abnormalities. The left   ventricular ejection fraction is 55-60% by visual  estimate and 3D   echocardiography. Normal left ventricular ejection fraction.   · There is grade I (mild) LV diastolic dysfunction.   · The right ventricle is normal in size. There is normal function of the   right ventricle.   · The right atrium is mildly dilated.   · There is mild mitral valve anterior leaflet thickening. There is mild   mitral valve leaflet calcification. There is flattening of the mitral   leaflets without raphael prolapse. There is trace mitral regurgitation.   · The aortic valve is trileaflet. There is mild thickening of the aortic   valve. There is mild calcification of the aortic valve. There is no aortic   /stenosis. There is trace aortic regurgitation.   · There is mild tricuspid valve leaflet thickening. There is mild to   moderate tricuspid regurgitation. The pulmonary artery systolic pressure   is moderately elevated. Pulmonary artery systolic pressure measures 50   mmHg. There is mid-systolic notching of the RVOT VTI consistent with   elevated pulmonary vascular resistance.   · The inferior vena cava is normal in size (diameter <21 mm) and decreases   >50% in size with inspiration, suggesting a normal right atrial pressure   of 3 mmHg (range 0-5 mm Hg).   · Trivial loculated pericardial effusion present adjacent to the right   ventricle and adjacent to the right atrium.          Assessment / Plan:     1. Chest Pain:  - Given pattern and character, I believe this is musculoskeletal / serous pain from her autoimmune disease. There may be an element of myopericarditis.  - She reports that pain has not responded to increased in steroids in the past   - Non-ACS chest pain and known non-obstructive CAD as recently as March 2024 CCTA.   -A lidocaine patch was placed just prior to me entering the room.  Hopefully this will help her pain.    2. Pulmonary Hypertension (WHO Group I, NYHA/WHO FC III):   - Occurring in the background of Systemic Scleroderma with ILD. August 2022 TTE showed normal RV  size and function, but progressive exertional decline, worsened DLCO and resting tachycardia suggest there may be progression of her pulmonary vascular disease and limitation of cardiac output. This was proven with 11/2022 RHC showing mean PA 35 mmHg (with PCWP 13 mmHg) and PVR 4.3 Wood units. Recent TTEs have shown appropriate RV:PA coupling with normal RV size and systolic function.   - She is hypervolemic on exam  - CXR improved today  -She was switched to ethacrynic acid 50 mg p.o. daily yesterday.  Recommend an additional dose of 50 mg p.o. today as she appears to be still mildly overloaded. This is higher than home dose so hopefully will achieve diuresis.  Please start daily standing weights.  Please start recording I's and O's.  - Continue Sildenafil 20 mg TID   - She should continue uninterrupted Macitentan 10 mg daily -her home medication has been brought in and she is receiving it.    3. HTN:   - Continue Amlodipine.     4. Systemic Scleroderma / Raynaud's Disease:   - Per Rheumatology (Dr. Trevizo).      5. ILD:   - Per Pulmonology and Rheumatology (Dr. Trevizo). She has not tolerated trials of Mycophenolate. She briefly received Tocilizumab.     6. PE:   - She has had elevated probability on V/Q scans, but CTA Chest has consistently not shown evidence for acute or chronic thromboembolism. Apixaban has been discontinued.     7. CAD:   - Two-vessel non-obstructive disease on March 2024 CCTA. She is continued on Atorvastatin 40 mg daily.     8. Toe Ulcerations  - Per Rheumatology, unlikely to see LE ulcerations with scleroderma  -Results of ultrasound noted.  Vascular surgery following, plans noted for lower extremity angiogram tomorrow.    9. Lung Mass  - Pathology from 8/8 biopsy with atypical glandular cells, possibly consistent with adenocarcinoma  - Management per pulmonology    KARYNA Carmona  8/13/2024

## 2024-08-13 NOTE — PROGRESS NOTES
"   Pulmonary Progress Note       SUBJECTIVE   Patient seen and examined at bedside. No acute events overnight.     Ongoing left sided chest tenderness, somewhat improved from prior.   Planned for bilateral lower extremity angiogram with vascular surgery.      OBJECTIVE        Vital Signs:   Visit Vitals  /60 (BP Location: Left upper arm, Patient Position: Sitting)   Pulse (!) 112   Temp 36.7 °C (98 °F) (Oral)   Resp 18   Ht 1.676 m (5' 6\")   Wt 50.8 kg (112 lb)   LMP  (LMP Unknown)   SpO2 (!) 90%   BMI 18.08 kg/m²       I/O's:    Intake/Output Summary (Last 24 hours) at 8/13/2024 1253  Last data filed at 8/13/2024 0800  Gross per 24 hour   Intake 125 ml   Output --   Net 125 ml       Medications:    Reviewed. Pertinent medications as below.     acetaminophen  975 mg oral q6h SPENCER    amLODIPine  10 mg oral q AM    artificial tears  1 drop Both Eyes QID    atorvastatin  40 mg oral Daily (6p)    cyanocobalamin  1,000 mcg oral Daily    diphenhydrAMINE  50 mg intravenous Once    ethacrynic acid  50 mg oral Daily    ethacrynic acid  50 mg oral Once    hydrocortisone sodium succinate  200 mg intravenous q6h INT    levothyroxine  100 mcg oral Daily    lidocaine  1 patch Topical Daily    lidocaine  1 patch Topical Daily    lidocaine  1 patch Topical Daily    LORazepam  0.25 mg oral Once    macitentan  10 mg oral Daily    magnesium sulfate  2 g intravenous Once    pantoprazole  20 mg oral Daily    artificial tears  1 drop Both Eyes q4h while awake    polyethylene glycol  17 g oral BID    senna  2 tablet oral BID    sildenafiL (pulm.hypertension)  20 mg oral TID    sodium chloride  2 spray Each Nostril Daily    white petrolatum   Topical BID    white petrolatum-mineral oil   Both Eyes Nightly           PHYSICAL EXAMINATION        General: The patient is in no acute distress. Chronically ill appearing.   HEENT: Mucous membranes are moist.  Sclera are anicteric.  Neck: Supple.  No cervical lymphadenopathy  Cardiovascular: " S1 and S2 are present.  There are no murmurs.  Lungs: Clear to auscultation bilaterally, minimal bibasilar crackles   Abdomen: Soft, nontender and nondistended  Extremities: Cool and dry   Neuro: Awake and alert.  Spontaneously moving all extremities       Diagnostic Data      Labs:    I have personally reviewed all pertinent patient laboratory results. Labs of note discussed below:    ABG Results         04/13/23     1949    Source Of Oxygen nc          CMP Results         08/13/24 08/09/24 07/25/24     0958 1938 1420     140 139    K 3.8 3.6 4.3    Cl 106 106 103    CO2 29 24 27    Glucose 113 97 86    BUN 23 18 17    Creatinine 1.0 1.3 1.1    Calcium 9.4 9.5 10.6    Anion Gap 6 10 9    AST -- -- 15    ALT -- -- 8    Albumin -- -- 4.3    EGFR >60.0 46.3 56.6           Comment for K at 1420 on 07/25/24: Results obtained on plasma. Plasma Potassium values may be up to 0.4 mEQ/L less than serum values. The differences may be greater for patients with high platelet or white cell counts.    Comment for EGFR at 0958 on 08/13/24: Calculation based on the Chronic Kidney Disease Epidemiology Collaboration (CKD-EPI) equation refit without adjustment for race.    Comment for EGFR at 1938 on 08/09/24: Calculation based on the Chronic Kidney Disease Epidemiology Collaboration (CKD-EPI) equation refit without adjustment for race.    Comment for EGFR at 1420 on 07/25/24: Calculation based on the Chronic Kidney Disease Epidemiology Collaboration (CKD-EPI) equation refit without adjustment for race.          CBC Results         08/13/24 08/09/24 07/25/24     0958 1938 1420    WBC 5.64 9.38 10.29    RBC 4.22 4.49 4.64    HGB 10.3 10.7 11.2    HCT 34.3 36.7 37.3    MCV 81.3 81.7 80.4    MCH 24.4 23.8 24.1    MCHC 30.0 29.2 30.0     285 331          Microbiology Results       Procedure Component Value Units Date/Time    Sputum culture / smear Bronchioalveolar Lavage, Left Lower Lobe [[id number]] Collected: 08/08/24 3594     Specimen: Bronch Lavage from Bronchioalveolar Lavage, Left Lower Lobe Updated: 08/10/24 0844    Narrative:      The following orders were created for panel order Sputum culture / smear Bronchioalveolar Lavage, Left Lower Lobe.  Procedure                               Abnormality         Status                     ---------                               -----------         ------                     Sputum Gram Stain (Lab O...[[id number]]                      Final result               Sputum Culture (Lab Only...[[id number]]  Normal              Final result                 Please view results for these tests on the individual orders.    Fungal Stain Bronchioalveolar Lavage, Left Lower Lobe [[id number]] Collected: 08/08/24 1315    Specimen: Bronch Lavage from Bronchioalveolar Lavage, Left Lower Lobe Updated: 08/09/24 1311     Fungus Stain No fungal elements seen    Fungal Culture Bronchioalveolar Lavage, Left Lower Lobe [[id number]]  (Normal) Collected: 08/08/24 1315    Specimen: Bronch Lavage from Bronchioalveolar Lavage, Left Lower Lobe Updated: 08/09/24 1501     Fungal Culture No Fungus Isolated to Date    Sputum Gram Stain (Lab Only) Bronchioalveolar Lavage, Left Lower Lobe [[id number]] Collected: 08/08/24 1315    Specimen: Bronch Lavage from Bronchioalveolar Lavage, Left Lower Lobe Updated: 08/08/24 1635     Gram Stain Result No WBC Seen      No Epithelial Cells Seen      No organisms seen    AFB stain Bronchioalveolar Lavage, Left Lower Lobe [[id number]]  (Normal) Collected: 08/08/24 1315    Specimen: Bronch Lavage from Bronchioalveolar Lavage, Left Lower Lobe Updated: 08/09/24 1303     AFB Stain No acid fast bacilli seen    AFB Culture Bronchioalveolar Lavage, Left Lower Lobe [[id number]]  (Normal) Collected: 08/08/24 1315    Specimen: Bronch Lavage from Bronchioalveolar Lavage, Left Lower Lobe Updated: 08/10/24 1001     AFB Culture No Acid Fast Bacilli Isolated to Date    Sputum Culture (Lab Only)  Bronchioalveolar Lavage, Left Lower Lobe [[id number]]  (Normal) Collected: 08/08/24 1315    Specimen: Bronch Lavage from Bronchioalveolar Lavage, Left Lower Lobe Updated: 08/10/24 0844     Culture No growth at 48 hours          Imaging:    I have reviewed all pertinent imaging.      ASSESSMENT AND PLAN      2 y.o. 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           Chronic hypoxemic respiratory failure   Cutaneous systemic scleroderma with subsequent ILD and Group 1 pulmonary hypertension  Left lower lobe nodule, s/p ION navigational bronchoscopy with biopsy showing a small focus of atypical glands.   DPLD, has not tolerated trials of Mycophenolate. On Tocilizumab.   Toe Ulcerations, severe PAD  Chest pain, low concern for ACS     -Planned for bilateral lower extremity angiogram with vascular surgery.   -pain control per primary   -consider gentle diuresis   -outpatient follow up with pulmonary regarding biopsy findings   -Check ambulatory pulse ox, wean o2 if able      Please page [phone] with any questions/concerns        Rayray Martinez MD  [id number]  Pulmonary/Critical Care Fellow  8/13/2024

## 2024-08-13 NOTE — ASSESSMENT & PLAN NOTE
Left lower lobe nodule, s/p ION navigational bronchoscopy with biopsy showing a small focus of atypical glands.     -Pulm input appreciated  -outpatient follow up with pulmonary regarding biopsy findings

## 2024-08-13 NOTE — PROGRESS NOTES
"    Hospital Medicine     Daily Progress Note       SUBJECTIVE   Interval History: Patient appears more awake and alert to me today.  She did take 2 doses of Flexeril so far, she denies any improvement in her \"bony chest pain \", reports that the Flexeril did help her sleep.  She is tearful, feeling overwhelmed with her pain.  We extensively discussed trying the tramadol at the lowest dose and see how she responds.  She continues to endorse shortness of breath. She wears 2L O2NC at baseline.     OBJECTIVE      Vital signs in last 24 hours:  Temp:  [36.3 °C (97.4 °F)-37.2 °C (98.9 °F)] 36.7 °C (98 °F)  Heart Rate:  [] 112  Resp:  [18-20] 18  BP: (127-155)/(60-71) 134/60    Intake/Output Summary (Last 24 hours) at 8/13/2024 1350  Last data filed at 8/13/2024 0800  Gross per 24 hour   Intake 125 ml   Output --   Net 125 ml       PHYSICAL EXAMINATION      Physical Exam  Vitals and nursing note reviewed.   Constitutional:       General: She is not in acute distress.     Comments: Chronically ill-appearing, underweight   HENT:      Head: Normocephalic.   Eyes:      Conjunctiva/sclera: Conjunctivae normal.   Cardiovascular:      Rate and Rhythm: Normal rate and regular rhythm.   Pulmonary:      Effort: Pulmonary effort is normal. No respiratory distress.      Breath sounds: Normal breath sounds.   Abdominal:      General: Bowel sounds are normal. There is no distension.      Palpations: Abdomen is soft.      Tenderness: There is no abdominal tenderness.   Musculoskeletal:      Cervical back: Neck supple.      Right lower leg: No edema.      Left lower leg: No edema.   Skin:     General: Skin is warm and dry.   Neurological:      Mental Status: She is alert and oriented to person, place, and time.   Psychiatric:         Behavior: Behavior normal.           LINES, CATHETERS, DRAINS, AIRWAYS, AND WOUNDS   Lines, Drains, and Airways:  Wounds (agree with documentation and present on admission):  Peripheral IV (Adult) " 08/08/24 Anterior;Left;Proximal Forearm (Active)   Number of days: 4       Wound Anterior;Right Toe (2nd) (Active)   Number of days: 4       Wound Anterior;Left Toe (Great) (Active)   Number of days: 4       Wound Anterior;Left Toe (2nd) (Active)   Number of days: 4         Comments:    LABS / IMAGING / TELE      Labs  Results from last 7 days   Lab Units 08/13/24  0958   SODIUM mEQ/L 141   POTASSIUM mEQ/L 3.8   CHLORIDE mEQ/L 106   CO2 mEQ/L 29   BUN mg/dL 23   CREATININE mg/dL 1.0   GLUCOSE mg/dL 113*   CALCIUM mg/dL 9.4     Results from last 7 days   Lab Units 08/13/24  0958   WBC K/uL 5.64   HEMOGLOBIN g/dL 10.3*   HEMATOCRIT % 34.3*   PLATELETS K/uL 199           Imaging  No new images today      ECG/Telemetry  Reviewed    ASSESSMENT AND PLAN      Chest pain  Assessment & Plan  Chest pain has been ongoing  Exam and history suggest MSK  Pain control is difficult as patient has various allergies, including morphine, oxycodone, NSAIDs.  She has a lidocaine patch, she does not think it is helping much  Consulted pain management; Advised addition of Lyrica, Flexeril, tramadol  Currently, ophthalmology recommended cessation of Lyrica given concerns of it causing her blurry vision  Patient is very apprehensive to try any new medications though she did try the Flexeril and tramadol today, encouraged pt to continue trying new medications to see what works as she is still having significant ongoing chest pain that has not improved whatsoever since she's been here    Interstitial lung disease (CMS/HCC)  Assessment & Plan  DPLD  Does not appear to be in exacerbation for this  Has not tolerated trials of Mycophenolate. On Tocilizumab.   On 2L O2-NC chronically    Wound of left leg  Assessment & Plan  bilateral lower extremity wounds and bilateral second digit dry gangrene  Podiatry consult appreciated  Advised PVR, arterial US; Ultrasound ENRRIQUE with various deficiencies, but seems worse in the left PT at 0.62.  Arterial  ultrasound with hemodynamically significant stenosis in the bilateral popliteal arteries and distal fem  Vascular surgery consulted, plan for bilateral lower extremity angiogram 8/14, n.p.o. at midnight.  Contrast dye prep ordered by vascular surgery.  Local wound care as per pods    Pain of both eyes  Assessment & Plan  Late 8/10 noted right eye pain,, swelling, blurry vision  Seen by ophthalmology  No concern for preseptal or orbital cellulitis  CT orbits: No evidence of acute process involving the orbits  Carotid US negative for HD significant stenosis  Advised various drops, ointments for her eyes  Advised stopping Lyrica    Pulmonary nodule  Assessment & Plan  Left lower lobe nodule, s/p ION navigational bronchoscopy with biopsy showing a small focus of atypical glands.     -Pulm input appreciated  -outpatient follow up with pulmonary regarding biopsy findings    Acute on chronic heart failure with preserved ejection fraction (CMS/HCC)  Assessment & Plan  EF 65%  Known to Cardiology Dr. Arvizu, in-house consult placed  S/p IV ethacrynic acid, now back on PO  Per cardiology, give an extra dose of ethacrynic acid as she still appears overloaded  Continue pHTN meds; opsumit, revatio  CHF order set, daily weights, salt/fluid restriction, strict Is&Os      Scleroderma (CMS/HCC)  Assessment & Plan  Again, chronic  Tocilizumab every 28 days    Pulmonary hypertension (CMS/HCC)  Assessment & Plan  Chronic, extensive history    Having her daughter bring in her macitentan  On sildenafil  Also offloading with ethacrynic acid    Essential (primary) hypertension  Assessment & Plan  Pressure stable here, continue home medications         VTE Assessment: Padua    VTE Prophylaxis:  Current anticoagulants:    None      Code Status: Full Code      Estimated Discharge Date: 8/13/2024       Disposition Planning: pending clinical course     KARYNA Bragg  8/13/2024

## 2024-08-13 NOTE — PLAN OF CARE
Plan of Care Review  Plan of Care Reviewed With: patient  Progress: no change  Outcome Evaluation: Manage pain-adm pain medication as ordered. encoraged relaxation techniques

## 2024-08-13 NOTE — NURSING NOTE
Updates-no changes from previous assessment. Pt resting in bed, and stated that Zofran helped with nausea, but Tramadol did not he;p with pain. Relaxation and deep breathing techniques encouraged which helped brought HR down from 118 to 95.  Every hr checks done, call bell within reach, will cont to monitor.

## 2024-08-13 NOTE — ASSESSMENT & PLAN NOTE
-anxiety likely related to fear of medication allergies    Patient tearful expressing feeling overwhelmed with the frequency of hospitalizations and complexity of medical conditions.     Plan  - Trial lorazepam 0.25 mg PO x 1, monitor for response  -possible Psych evaluation given medical complexity

## 2024-08-14 ENCOUNTER — ANESTHESIA EVENT (INPATIENT)
Dept: OPERATING ROOM | Facility: HOSPITAL | Age: 64
DRG: 515 | End: 2024-08-14
Payer: COMMERCIAL

## 2024-08-14 ENCOUNTER — ANCILLARY PROCEDURE (OUTPATIENT)
Dept: OPERATING ROOM | Facility: HOSPITAL | Age: 64
End: 2024-08-14
Attending: SURGERY
Payer: COMMERCIAL

## 2024-08-14 ENCOUNTER — APPOINTMENT (INPATIENT)
Dept: RADIOLOGY | Facility: HOSPITAL | Age: 64
DRG: 515 | End: 2024-08-14
Attending: SURGERY
Payer: COMMERCIAL

## 2024-08-14 ENCOUNTER — ANESTHESIA (INPATIENT)
Dept: OPERATING ROOM | Facility: HOSPITAL | Age: 64
DRG: 515 | End: 2024-08-14
Payer: COMMERCIAL

## 2024-08-14 PROBLEM — S81.809A WOUND OF LOWER EXTREMITY: Status: ACTIVE | Noted: 2024-08-11

## 2024-08-14 PROCEDURE — 63600000 HC DRUGS/DETAIL CODE: Mod: JZ

## 2024-08-14 PROCEDURE — 63700000 HC SELF-ADMINISTRABLE DRUG: Performed by: INTERNAL MEDICINE

## 2024-08-14 PROCEDURE — C1894 INTRO/SHEATH, NON-LASER: HCPCS | Performed by: SURGERY

## 2024-08-14 PROCEDURE — 25000000 HC PHARMACY GENERAL: Performed by: STUDENT IN AN ORGANIZED HEALTH CARE EDUCATION/TRAINING PROGRAM

## 2024-08-14 PROCEDURE — 21400000 HC ROOM AND CARE CCU/INTERMEDIATE

## 2024-08-14 PROCEDURE — 63700000 HC SELF-ADMINISTRABLE DRUG: Performed by: PHYSICIAN ASSISTANT

## 2024-08-14 PROCEDURE — B41JYZZ FLUOROSCOPY OF OTHER LOWER ARTERIES USING OTHER CONTRAST: ICD-10-PCS | Performed by: SURGERY

## 2024-08-14 PROCEDURE — 25800000 HC PHARMACY IV SOLUTIONS

## 2024-08-14 PROCEDURE — 27200000 HC STERILE SUPPLY: Performed by: SURGERY

## 2024-08-14 PROCEDURE — 63600000 HC DRUGS/DETAIL CODE: Performed by: SURGERY

## 2024-08-14 PROCEDURE — 37000001 HC ANESTHESIA GENERAL: Performed by: SURGERY

## 2024-08-14 PROCEDURE — 37224 PR REVSC OPN/PRG FEM/POP W/ANGIOPLASTY UNI: CPT | Mod: LT | Performed by: SURGERY

## 2024-08-14 PROCEDURE — 25000000 HC PHARMACY GENERAL: Performed by: SURGERY

## 2024-08-14 PROCEDURE — 047N3Z1 DILATION OF LEFT POPLITEAL ARTERY USING DRUG-COATED BALLOON, PERCUTANEOUS APPROACH: ICD-10-PCS | Performed by: SURGERY

## 2024-08-14 PROCEDURE — 75710 ARTERY X-RAYS ARM/LEG: CPT | Mod: 26,XS,RT | Performed by: SURGERY

## 2024-08-14 PROCEDURE — 25800000 HC PHARMACY IV SOLUTIONS: Performed by: SURGERY

## 2024-08-14 PROCEDURE — 36000003 HC OR LEVEL 3 INITIAL 30MIN: Performed by: SURGERY

## 2024-08-14 PROCEDURE — 71000011 HC PACU PHASE 1 EA ADDL MIN: Performed by: SURGERY

## 2024-08-14 PROCEDURE — 36000013 HC OR LEVEL 3 EA ADDL MIN: Performed by: SURGERY

## 2024-08-14 PROCEDURE — 047L3Z1 DILATION OF LEFT FEMORAL ARTERY USING DRUG-COATED BALLOON, PERCUTANEOUS APPROACH: ICD-10-PCS | Performed by: SURGERY

## 2024-08-14 PROCEDURE — 63700000 HC SELF-ADMINISTRABLE DRUG: Performed by: STUDENT IN AN ORGANIZED HEALTH CARE EDUCATION/TRAINING PROGRAM

## 2024-08-14 PROCEDURE — C1769 GUIDE WIRE: HCPCS | Performed by: SURGERY

## 2024-08-14 PROCEDURE — 99233 SBSQ HOSP IP/OBS HIGH 50: CPT | Performed by: INTERNAL MEDICINE

## 2024-08-14 PROCEDURE — 63700000 HC SELF-ADMINISTRABLE DRUG: Performed by: HOSPITALIST

## 2024-08-14 PROCEDURE — 047Y3ZZ DILATION OF LOWER ARTERY, PERCUTANEOUS APPROACH: ICD-10-PCS | Performed by: SURGERY

## 2024-08-14 PROCEDURE — C1725 CATH, TRANSLUMIN NON-LASER: HCPCS | Performed by: SURGERY

## 2024-08-14 PROCEDURE — 63600105 HC IODINE BASED CONTRAST: Mod: JW | Performed by: SURGERY

## 2024-08-14 PROCEDURE — 71000001 HC PACU PHASE 1 INITIAL 30MIN: Performed by: SURGERY

## 2024-08-14 PROCEDURE — C1887 CATHETER, GUIDING: HCPCS | Performed by: SURGERY

## 2024-08-14 PROCEDURE — 99232 SBSQ HOSP IP/OBS MODERATE 35: CPT | Performed by: INTERNAL MEDICINE

## 2024-08-14 PROCEDURE — 63600000 HC DRUGS/DETAIL CODE: Mod: JW | Performed by: STUDENT IN AN ORGANIZED HEALTH CARE EDUCATION/TRAINING PROGRAM

## 2024-08-14 PROCEDURE — 37228 PR REVSC OPN/PRQ TIB/PERO W/ANGIOPLASTY UNI: CPT | Mod: LT | Performed by: SURGERY

## 2024-08-14 PROCEDURE — 63700000 HC SELF-ADMINISTRABLE DRUG: Performed by: NURSE PRACTITIONER

## 2024-08-14 RX ORDER — FENTANYL CITRATE 50 UG/ML
INJECTION, SOLUTION INTRAMUSCULAR; INTRAVENOUS AS NEEDED
Status: DISCONTINUED | OUTPATIENT
Start: 2024-08-14 | End: 2024-08-14 | Stop reason: SURG

## 2024-08-14 RX ORDER — IBUPROFEN 200 MG
16-32 TABLET ORAL AS NEEDED
Status: DISCONTINUED | OUTPATIENT
Start: 2024-08-14 | End: 2024-08-14 | Stop reason: HOSPADM

## 2024-08-14 RX ORDER — LIDOCAINE HYDROCHLORIDE 10 MG/ML
INJECTION, SOLUTION INFILTRATION; PERINEURAL
Status: DISCONTINUED | OUTPATIENT
Start: 2024-08-14 | End: 2024-08-14 | Stop reason: HOSPADM

## 2024-08-14 RX ORDER — HEPARIN SODIUM 1000 [USP'U]/ML
INJECTION, SOLUTION INTRAVENOUS; SUBCUTANEOUS AS NEEDED
Status: DISCONTINUED | OUTPATIENT
Start: 2024-08-14 | End: 2024-08-14 | Stop reason: SURG

## 2024-08-14 RX ORDER — LEVOTHYROXINE SODIUM 100 UG/1
100 TABLET ORAL
Status: DISCONTINUED | OUTPATIENT
Start: 2024-08-15 | End: 2024-09-03 | Stop reason: HOSPADM

## 2024-08-14 RX ORDER — CLOPIDOGREL BISULFATE 75 MG/1
75 TABLET ORAL DAILY
Status: DISCONTINUED | OUTPATIENT
Start: 2024-08-15 | End: 2024-08-17

## 2024-08-14 RX ORDER — PROPOFOL 10 MG/ML
INJECTION, EMULSION INTRAVENOUS CONTINUOUS PRN
Status: DISCONTINUED | OUTPATIENT
Start: 2024-08-14 | End: 2024-08-14 | Stop reason: SURG

## 2024-08-14 RX ORDER — LIDOCAINE HYDROCHLORIDE 10 MG/ML
INJECTION, SOLUTION INFILTRATION; PERINEURAL AS NEEDED
Status: DISCONTINUED | OUTPATIENT
Start: 2024-08-14 | End: 2024-08-14 | Stop reason: SURG

## 2024-08-14 RX ORDER — HEPARIN SOD,PORCINE/0.9 % NACL 4K/1000 ML
INTRAVENOUS SOLUTION INTRAVENOUS
Status: DISCONTINUED | OUTPATIENT
Start: 2024-08-14 | End: 2024-08-14 | Stop reason: HOSPADM

## 2024-08-14 RX ORDER — FENTANYL CITRATE 50 UG/ML
50 INJECTION, SOLUTION INTRAMUSCULAR; INTRAVENOUS EVERY 5 MIN PRN
Status: DISCONTINUED | OUTPATIENT
Start: 2024-08-14 | End: 2024-08-14 | Stop reason: HOSPADM

## 2024-08-14 RX ORDER — CLOPIDOGREL BISULFATE 75 MG/1
150 TABLET ORAL ONCE
Status: COMPLETED | OUTPATIENT
Start: 2024-08-14 | End: 2024-08-14

## 2024-08-14 RX ORDER — DEXTROSE 40 %
15-30 GEL (GRAM) ORAL AS NEEDED
Status: DISCONTINUED | OUTPATIENT
Start: 2024-08-14 | End: 2024-08-14 | Stop reason: HOSPADM

## 2024-08-14 RX ORDER — DEXTROSE 50 % IN WATER (D50W) INTRAVENOUS SYRINGE
25 AS NEEDED
Status: DISCONTINUED | OUTPATIENT
Start: 2024-08-14 | End: 2024-08-14 | Stop reason: HOSPADM

## 2024-08-14 RX ORDER — ONDANSETRON HYDROCHLORIDE 2 MG/ML
4 INJECTION, SOLUTION INTRAVENOUS
Status: DISCONTINUED | OUTPATIENT
Start: 2024-08-14 | End: 2024-08-14 | Stop reason: HOSPADM

## 2024-08-14 RX ORDER — IOPAMIDOL 755 MG/ML
INJECTION, SOLUTION INTRAVASCULAR
Status: DISCONTINUED | OUTPATIENT
Start: 2024-08-14 | End: 2024-08-14 | Stop reason: HOSPADM

## 2024-08-14 RX ADMIN — LIDOCAINE HYDROCHLORIDE 5 ML: 10 INJECTION, SOLUTION INFILTRATION; PERINEURAL at 13:36

## 2024-08-14 RX ADMIN — PREDNISONE 50 MG: 50 TABLET ORAL at 11:40

## 2024-08-14 RX ADMIN — DEXTRAN 70, GLYCERIN, HYPROMELLOSE 1 DROP: 1; 2; 3 SOLUTION/ DROPS OPHTHALMIC at 22:22

## 2024-08-14 RX ADMIN — PANTOPRAZOLE SODIUM 20 MG: 20 TABLET, DELAYED RELEASE ORAL at 08:46

## 2024-08-14 RX ADMIN — ETHACRYNIC ACID 50 MG: 25 TABLET ORAL at 08:35

## 2024-08-14 RX ADMIN — HEPARIN SODIUM 6000 UNITS: 1000 INJECTION, SOLUTION INTRAVENOUS; SUBCUTANEOUS at 14:10

## 2024-08-14 RX ADMIN — PROPOFOL 50 MCG/KG/MIN: 10 INJECTION, EMULSION INTRAVENOUS at 13:36

## 2024-08-14 RX ADMIN — LEVOTHYROXINE SODIUM 100 MCG: 0.1 TABLET ORAL at 06:01

## 2024-08-14 RX ADMIN — SILDENAFIL 20 MG: 20 TABLET, FILM COATED ORAL at 16:54

## 2024-08-14 RX ADMIN — LIDOCAINE 4% 1 PATCH: 40 PATCH TOPICAL at 08:35

## 2024-08-14 RX ADMIN — ACETAMINOPHEN 975 MG: 325 TABLET ORAL at 22:18

## 2024-08-14 RX ADMIN — CYCLOBENZAPRINE HYDROCHLORIDE 5 MG: 5 TABLET, FILM COATED ORAL at 08:46

## 2024-08-14 RX ADMIN — ACETAMINOPHEN 975 MG: 325 TABLET ORAL at 11:05

## 2024-08-14 RX ADMIN — WHITE PETROLATUM: 1.75 OINTMENT TOPICAL at 08:39

## 2024-08-14 RX ADMIN — VANCOMYCIN HYDROCHLORIDE 1000 MG: 1 INJECTION, POWDER, LYOPHILIZED, FOR SOLUTION INTRAVENOUS at 12:24

## 2024-08-14 RX ADMIN — FENTANYL CITRATE 50 MCG: 50 INJECTION, SOLUTION INTRAMUSCULAR; INTRAVENOUS at 14:00

## 2024-08-14 RX ADMIN — MINERAL OIL, WHITE PETROLATUM: .03; .94 OINTMENT OPHTHALMIC at 22:24

## 2024-08-14 RX ADMIN — CYCLOBENZAPRINE HYDROCHLORIDE 5 MG: 5 TABLET, FILM COATED ORAL at 16:52

## 2024-08-14 RX ADMIN — BENZOCAINE 6 MG-MENTHOL 10 MG LOZENGES 1 LOZENGE: at 05:44

## 2024-08-14 RX ADMIN — BENZOCAINE 6 MG-MENTHOL 10 MG LOZENGES 1 LOZENGE: at 08:47

## 2024-08-14 RX ADMIN — CYANOCOBALAMIN TAB 1000 MCG 1000 MCG: 1000 TAB at 08:34

## 2024-08-14 RX ADMIN — GLYCERIN 1 DROP: .002; .002; .01 SOLUTION/ DROPS OPHTHALMIC at 16:56

## 2024-08-14 RX ADMIN — GLYCERIN 1 DROP: .002; .002; .01 SOLUTION/ DROPS OPHTHALMIC at 20:56

## 2024-08-14 RX ADMIN — SALINE NASAL SPRAY 2 SPRAY: 1.5 SOLUTION NASAL at 08:39

## 2024-08-14 RX ADMIN — ATORVASTATIN CALCIUM 40 MG: 40 TABLET, FILM COATED ORAL at 16:54

## 2024-08-14 RX ADMIN — DIPHENHYDRAMINE HYDROCHLORIDE 50 MG: 25 CAPSULE ORAL at 11:39

## 2024-08-14 RX ADMIN — PREDNISONE 50 MG: 50 TABLET ORAL at 05:44

## 2024-08-14 RX ADMIN — DEXTRAN 70, GLYCERIN, HYPROMELLOSE 1 DROP: 1; 2; 3 SOLUTION/ DROPS OPHTHALMIC at 08:38

## 2024-08-14 RX ADMIN — ACETAMINOPHEN 975 MG: 325 TABLET ORAL at 05:44

## 2024-08-14 RX ADMIN — BENZOCAINE 6 MG-MENTHOL 10 MG LOZENGES 1 LOZENGE: at 16:53

## 2024-08-14 RX ADMIN — CLOPIDOGREL 150 MG: 75 TABLET ORAL at 16:55

## 2024-08-14 RX ADMIN — SILDENAFIL 20 MG: 20 TABLET, FILM COATED ORAL at 08:34

## 2024-08-14 RX ADMIN — DEXTRAN 70, GLYCERIN, HYPROMELLOSE 1 DROP: 1; 2; 3 SOLUTION/ DROPS OPHTHALMIC at 16:55

## 2024-08-14 RX ADMIN — BENZONATATE 100 MG: 100 CAPSULE ORAL at 08:46

## 2024-08-14 RX ADMIN — FENTANYL CITRATE 50 MCG: 50 INJECTION, SOLUTION INTRAMUSCULAR; INTRAVENOUS at 13:36

## 2024-08-14 RX ADMIN — SILDENAFIL 20 MG: 20 TABLET, FILM COATED ORAL at 20:51

## 2024-08-14 RX ADMIN — WHITE PETROLATUM: 1.75 OINTMENT TOPICAL at 20:54

## 2024-08-14 RX ADMIN — AMLODIPINE BESYLATE 10 MG: 10 TABLET ORAL at 08:34

## 2024-08-14 RX ADMIN — GLYCERIN 1 DROP: .002; .002; .01 SOLUTION/ DROPS OPHTHALMIC at 08:38

## 2024-08-14 ASSESSMENT — COGNITIVE AND FUNCTIONAL STATUS - GENERAL
STANDING UP FROM CHAIR USING ARMS: 3 - A LITTLE
WALKING IN HOSPITAL ROOM: 3 - A LITTLE
MOVING TO AND FROM BED TO CHAIR: 3 - A LITTLE
WALKING IN HOSPITAL ROOM: 3 - A LITTLE
CLIMB 3 TO 5 STEPS WITH RAILING: 3 - A LITTLE
STANDING UP FROM CHAIR USING ARMS: 3 - A LITTLE
MOVING TO AND FROM BED TO CHAIR: 3 - A LITTLE
CLIMB 3 TO 5 STEPS WITH RAILING: 3 - A LITTLE

## 2024-08-14 NOTE — ANESTHESIA POSTPROCEDURE EVALUATION
Patient: Arielle Felix    Procedure Summary       Date: 08/14/24 Room / Location: [facility] OR [facility] / [facility]    Anesthesia Start: 1330 Anesthesia Stop: 1458    Procedure: ANGIOGRAM/ANGIOPLASTY FEMORAL , POSS. STENT/COIL/FEM-POP (Bilateral) Diagnosis:       Scleroderma (CMS/HCC)      Multiple opens wound of lower extremity, unspecified laterality, sequela      Peripheral arterial disease (CMS/HCC)      (Scleroderma (CMS/HCC) [M34.9])      (Multiple opens wound of lower extremity, unspecified laterality, sequela [S81.809S])      (Peripheral arterial disease (CMS/HCC) [I73.9])    Surgeons: Rashel Richards MD FACS Responsible Provider: Marilu Schofield MD    Anesthesia Type: general ASA Status: 3            Anesthesia Type: general  PACU Vitals    No data found in the last 10 encounters.           Anesthesia Post Evaluation    Pain management: adequate  Patient participation: complete - patient participated  Level of consciousness: awake and alert  Cardiovascular status: acceptable  Airway Patency: adequate  Respiratory status: acceptable  Hydration status: acceptable  Anesthetic complications: no

## 2024-08-14 NOTE — ANESTHESIOLOGIST PRE-PROCEDURE ATTESTATION
Pre-Procedure Patient Identification:  I am the Primary Anesthesiologist and have identified the patient on 08/14/24 at 12:52 PM.   I have confirmed the procedure(s) will be performed by the following surgeon/proceduralist Rashel Richards MD FACS.

## 2024-08-14 NOTE — NURSING NOTE
Pt picked up by transport and sent to OR. Report given to OR RN about pt receiving prep for IV dye allergy. Pt received dose of PO benadryl and PO prednisone as ordered. RN reported IV Vanco dose to be given pre op.

## 2024-08-14 NOTE — PROGRESS NOTES
Hospital Medicine     Daily Progress Note       SUBJECTIVE   Interval History: Patient feels that she had a reaction to tramadol yesterday, reports she had an elevated heart rate and nausea/vomiting.  Still no improvement in her bony chest pain, does feel that she is tolerating Flexeril well and it helps relax her.  We discussed that there is likely an anxiety component here and she is in agreement. She is having ft pain as well asking for a heat pack.     OBJECTIVE      Vital signs in last 24 hours:  Temp:  [36.3 °C (97.3 °F)-36.9 °C (98.5 °F)] 36.6 °C (97.8 °F)  Heart Rate:  [] 94  Resp:  [16-81] 20  BP: (119-145)/(56-67) 145/57    Intake/Output Summary (Last 24 hours) at 8/14/2024 1300  Last data filed at 8/14/2024 0800  Gross per 24 hour   Intake 0 ml   Output --   Net 0 ml       PHYSICAL EXAMINATION      Physical Exam  Vitals and nursing note reviewed.   Constitutional:       General: She is not in acute distress.     Comments: Chronically ill-appearing, underweight   HENT:      Head: Normocephalic.   Eyes:      Conjunctiva/sclera: Conjunctivae normal.   Cardiovascular:      Rate and Rhythm: Normal rate and regular rhythm.   Pulmonary:      Effort: Pulmonary effort is normal. No respiratory distress.      Breath sounds: Normal breath sounds.   Abdominal:      General: Bowel sounds are normal. There is no distension.      Palpations: Abdomen is soft.      Tenderness: There is no abdominal tenderness.   Musculoskeletal: Hand deformities-chronic     Cervical back: Neck supple.      Right lower leg: No edema.      Left lower leg: No edema.   Skin:     General: Skin is warm and dry.   Neurological:      Mental Status: She is alert and oriented to person, place, and time.   Psychiatric:         Behavior: Behavior normal.           LINES, CATHETERS, DRAINS, AIRWAYS, AND WOUNDS   Lines, Drains, and Airways:  Wounds (agree with documentation and present on admission):  Peripheral IV (Adult) 08/08/24  Anterior;Left;Proximal Forearm (Active)   Number of days: 4       Wound Anterior;Right Toe (2nd) (Active)   Number of days: 4       Wound Anterior;Left Toe (Great) (Active)   Number of days: 4       Wound Anterior;Left Toe (2nd) (Active)   Number of days: 4         Comments:    LABS / IMAGING / TELE      Labs  Results from last 7 days   Lab Units 08/13/24  0958   SODIUM mEQ/L 141   POTASSIUM mEQ/L 3.8   CHLORIDE mEQ/L 106   CO2 mEQ/L 29   BUN mg/dL 23   CREATININE mg/dL 1.0   GLUCOSE mg/dL 113*   CALCIUM mg/dL 9.4     Results from last 7 days   Lab Units 08/13/24  0958   WBC K/uL 5.64   HEMOGLOBIN g/dL 10.3*   HEMATOCRIT % 34.3*   PLATELETS K/uL 199           Imaging  No new images today      ECG/Telemetry  Reviewed    ASSESSMENT AND PLAN      Chest pain  Assessment & Plan  Chest pain has been ongoing  Exam and history suggest MSK  Pain control is difficult as patient has various allergies, including morphine, oxycodone, NSAIDs.  She has a lidocaine patch, she does not think it is helping much  Consulted pain management; Advised addition of Lyrica, Flexeril, tramadol  Currently, ophthalmology recommended cessation of Lyrica given concerns of it causing her blurry vision  Patient is very apprehensive to try any new medications, she could not tolerate tramadol, she is doing well with Flexeril though however    Interstitial lung disease (CMS/HCC)  Assessment & Plan  DPLD  Does not appear to be in exacerbation for this  Has not tolerated trials of Mycophenolate. On Tocilizumab.   On 2L O2-NC chronically    Wound of lower extremity  Assessment & Plan  bilateral lower extremity wounds and bilateral second digit dry gangrene  Podiatry consult appreciated  Advised PVR, arterial US; Ultrasound ENRRIQUE with various deficiencies, but seems worse in the left PT at 0.62.  Arterial ultrasound with hemodynamically significant stenosis in the bilateral popliteal arteries and distal fem  Vascular surgery consulted, plan for bilateral  lower extremity angiogram 8/14, n.p.o. at midnight.  Contrast dye prep ordered by vascular surgery.  Local wound care as per pods    Pain of both eyes  Assessment & Plan  Late 8/10 noted right eye pain,, swelling, blurry vision  Seen by ophthalmology  No concern for preseptal or orbital cellulitis  CT orbits: No evidence of acute process involving the orbits  Carotid US negative for HD significant stenosis  Advised various drops, ointments for her eyes  Advised stopping Lyrica  Both blurred vision and eye pain have resolved    Pulmonary nodule  Assessment & Plan  Left lower lobe nodule, s/p ION navigational bronchoscopy with biopsy showing a small focus of atypical glands.     -Pulm input appreciated  -outpatient follow up with pulmonary regarding biopsy findings    Acute on chronic heart failure with preserved ejection fraction (CMS/HCC)  Assessment & Plan  EF 65%  Known to Cardiology Dr. Arvizu, in-house consult placed  S/p IV ethacrynic acid, now back on PO  Continue pHTN meds; opsumit, revatio  CHF order set, daily weights, salt/fluid restriction, strict Is&Os      Scleroderma (CMS/HCC)  Assessment & Plan  Again, chronic  Tocilizumab every 28 days    Pulmonary hypertension (CMS/HCC)  Assessment & Plan  Chronic, extensive history    Having her daughter bring in her macitentan  On sildenafil  Also offloading with ethacrynic acid    Essential (primary) hypertension  Assessment & Plan  Pressure stable here, continue home medications         VTE Assessment: Padua    VTE Prophylaxis:  Current anticoagulants:    None      Code Status: Full Code      Estimated Discharge Date: 8/16/2024       Disposition Planning: pending clinical course     KARYNA Bragg  8/14/2024

## 2024-08-14 NOTE — PROGRESS NOTES
Cardiology/pulmonary hypertension service progress note    Interval History:    Continues to have left-sided chest tenderness, still using lidocaine patch and heating pad.  Dyspnea on exertion, indicates she ambulates to the bathroom.   There is a bilateral lower extremity angiogram planned for around noon the day with vascular surgery.      Background per Dr. Arvizu:    Ms. Felix is a 63 y.o. female with a past medical history of Pulmonary HTN, HTN, Raynaud's Disease, Cutaneous Systemic Scleroderma (dx 1998), ILD, PE (3/2023). She is a patient of Dr. Nguyễn. She was previously followed by Dr. Jos Valdez at \[facility]\"". Echocardiogram from 4/21/2022 showed LVEF: 55-60%, mild aortic valve thickening, pulmonary artery systolic pressure: 52 mmHg and trivial pericardial effusion. LHC and RHC (separate occasions) over the last 5-10 years which showed normal hemodynamics and normal coronaries. She had a RHC 9/02/2020 showing top-normal right sided filling pressures with mild pulmonary hypertension, top-normal PVR and normal pulmonary capillary wedge pressure. The cardiac index was normal, seen below.     On 6/27/2022, she was in [facility] for chest pain. EKG: NSR without ischemic changes. hsTrop: 12->16, d-dimer: 0.56, CXR showed cardiomegaly and diffuse interstitial prominence.     Dr. Nguyễn ordered echocardiogram, which was completed on 8/22/2022 and showed estimated RVSP = 50-55 mmHg, normal-sized LV with normal LV systolic function. Estimated EF 55- 60%. Wall motion grossly normal, mild concentric left ventricular hypertrophy, grade I LV diastolic dysfunction, probably normal RV size and function.    At her initial visit on 10/11/2022, she reported that she felt very short of breath with minimal activity most of the time. She reports that this started about 1 year prior and had progressively gotten worse. She described PND and bendopnea. She reported that she experienced ankle swelling, worsened over the past year  and usually worse towards the end of the night. She also reported some swelling in her abdomen. She reported daily palpations. I recommended a RHC which was done 11/28/2022 and showed: Normal right sided filling pressures. Mildly elevated left sided filling pressures. Precapillary pulmonary hypertension with mean PA 36 mmHg.    She was hospitalized on 3/19/2023 at [facility] for PE diagnosed on V/Q scan. She reports that she had presented with left arm pain and heaviness. She states that she was started on Apixaban. One week after her last office visit (4/6/2023), she presented to [facility] with chest pain, epistaxis and hemoptysis. Echocardiogram showed: left ventricular cavity size normal with mild left ventricular hypertrophy and preserved systolic function. Estimated EF 65%. Chest CTA showed no evidence of PE; Apixaban was discontinued.     She had a repeat echocardiogram (6/2023) which showed: Normal left ventricular cavity size and mild concentric left ventricular hypertrophy. Normal systolic function. EF: 60%. Normal right ventricular structure and function. Mild tricuspid valve regurgitation with estimated RVSP: 55 mmHg. Compared to previous study from 4/14/2023, estimated right ventricular systolic pressure were higher.    She underwent V/Q scan in September 2023, which showed intermediate-to-high probability of pulmonary embolic disease, as a result she completed CTA Chest PE which showed: no evidence for filling defect or vessel cutoff to suggest pulmonary embolism. Enlargement of the pulmonary arteries compatible with pulmonary arterial hypertension was seen.    She reports she started Macitentan around August or September 2023. She stated that she had not been checking her SpO2. She reports she did not notice any difference since starting it. She reported she had been feeling more shortness of breath at times with ADLs.     She presented to [facility] on 3/3/2024 with chest pressure, palpitations and  nausea. She was noted to have hypoxia and tachypnea at presentation. She was found to have moderate-to-large pericardial effusion without tamponade. She was started on Colchicine 0.6 mg BID on 3/4/2024 and when she did not improve symptomatically, Prednisone 20 mg daily was added on 3/5/2024. On 3/7/2024, she developed multiple episodes of diarrhea and Colchicine dose was decreased to 0.3 mg. Coronary CTA, done as part of the ischemic workup, showed moderate 2 vessel CAD. She was recommended to begin Atorvastatin 20 mg daily and Clopidogrel 75 mg daily. She initially refused those two medications, but eventually agreed to take Atorvastatin.      She was discharged on Colchicine 0.3 mg for at least 3 months and Prednisone 20 mg for a total two week course until 3/19/2024, then decrease the dose to 10 mg daily for 2 weeks until 4/2/2024, then decrease dose to 5 mg daily for 2 weeks until 4/16/2024. She reports she tapered off Prednisone as instructed and has continued to take Colchicine as prescribed. She completed an echocardiogram (4/10/2024) which showed: Small-to-moderate sized, circumferential pericardial effusion. No echocardiographic evidence for cardiac tamponade. Compared to previous study from 3/4/2024, no significant change. Pericardial effusion size was similar, estimated right atrial pressure lower.      She presented to [facility] ([place]16/2024) with increased pleuritic chest pain. On presentation to the ER, she was noted to be hypertensive and tachycardic. Labs showed mildly elevated troponin, elevated BNP and leukocytosis. CT angiography of the chest did not show evidence of PE. It was read as large pericardial effusion, evidence of emphysema, evidence of pulmonary hypertension, 16 mm left lobe lung mass.       She presented again to [facility] on 5/21/2024 at the request of her Rheumatologist. During her visit there, she was noted to be tachycardic and with some shortness of breath. Her chest pain was improved from  "her prior admission. On arrival, O2 saturations were normal. ECG showed: Sinus Tachycardia (HR: 125 bpm). BNP >300, hsTrop 32 ->24. POCUS in the emergency department showed evidence of pericardial effusion with no tamponade physiology. CTA Chest did not show evidence of pulmonary embolism. There were bilateral changes radiographically concerning for volume overload. She received Methylpredinsolone 125 mg in ED. Of note, she was on Prednisone 10 mg daily at home. She was given IV diuresis. She was discharged on Furosemide 20 mg PO PRN daily for fluid weight gain / swelling.     She had brief hospitalization and was discharged on 7/23 for chest pain. She presented to the ED on 7/25/2024 for worsening of her chronic chest pain and 8/5/2024 for facial swelling and chronic chest pain.      She underwent bronchoscopic lung biopsy yesterday, 8/8/2024, with Dr. Niles Rodriguez. Pathology showed atypical glandular cells which may be consistent with adenocarcinoma in situ.     Post-procedure CXR showed increase pulmonary vascular congestion. She was admitted for optimization.       ---    Rheumatology: Dr. Trevizo  Pulmonology: Dr. Wasserman    Past Medical / Surgical History:  Pulmonary HTN, HTN, Raynaud's Disease, Cutaneous Systemic Scleroderma (dx 1998), ILD, PE (3/2023), Follicular Carcinoma of Thyroid, Tenosynovitis, de Quervain, h/o Hernia Repair, h/o Appendicitis, h/o Digit Amputation, H/o Total Thyroidectomy (Dr. Pacheco at [facility]; 4/2013)    Family & Social History: She is , she has two children. She has worked as an .     Tobacco: former 2005  EtOH: never   Illicits: never     Her brother has CAD with stent  1st cousin with SLE  Both parents had \"heart problems\"    Allergies:   Allergies   Allergen Reactions    Aspirin Shortness of breath     Other reaction(s): Unknown  \"stop breathing\"      Ibuprofen Shortness of breath     Stop breathing    Iodine Shortness of breath     Other reaction(s): Unknown    Iodine " And Iodide Containing Products Shortness of breath     ? rash    Morphine Shortness of breath      Reported wamrth of face, improved with benadryl and cold compresses after getting morphine as well as episode of (subjective ) dyspnea, and thought she passed out - did have wamrth and erythema of face with mild edema R>L cheek notably on exam.     Penicillins Shortness of breath     Other reaction(s): Unknown    Sulfa (Sulfonamide Antibiotics) Angioedema    Clindamycin     Hydrochlorothiazide      Other reaction(s): Abdominal Pain   Slow heart rate    Oxycodone      Other reaction(s): Vomiting    Sulfamethoxazole-Trimethoprim      Other reaction(s): Vomiting, Weakness     Latex Rash       Medications:   Current Facility-Administered Medications   Medication Dose Route Frequency Provider Last Rate Last Admin    acetaminophen (TYLENOL) tablet 975 mg  975 mg oral q6h [facility] Molly Hidalgo CRNP   975 mg at 08/14/24 0544    amLODIPine (NORVASC) tablet 10 mg  10 mg oral q AM Joel Rodriguez MD   10 mg at 08/14/24 0834    artificial tears (dextran-hpm-glyc) (GENTEAL TEARS) 0.1-0.3-0.2 % eye drops 1 drop  1 drop Both Eyes QID Grant Watts DO   1 drop at 08/14/24 0838    atorvastatin (LIPITOR) tablet 40 mg  40 mg oral Daily (6p) Joel Rodriguez MD        benzocaine-menthol (CEPACOL/CHLORASEPTIC) lozenge 1 lozenge  1 lozenge Mouth/Throat q2h PRN Grant Watts DO   1 lozenge at 08/14/24 0847    benzonatate (TESSALON) capsule 100 mg  100 mg oral 3x daily PRN Joel Rodriguez MD   100 mg at 08/14/24 0846    carboxymethylcellulose (REFRESH PLUS) 0.5 % ophthalmic dropperette 1 drop  1 drop Right Eye 3x daily PRN Neela Bender O, DO        cyanocobalamin (VITAMIN B12) tablet 1,000 mcg  1,000 mcg oral Daily Joel Rodriguez MD   1,000 mcg at 08/14/24 0834    cyclobenzaprine (FLEXERIL) tablet 5 mg  5 mg oral 3x daily PRN Grant Watts DO   5 mg at 08/14/24 0846    glucose chewable tablet 16-32 g of dextrose  16-32 g of dextrose oral  PRN Joel Rodriguez MD        Or    dextrose 40 % oral gel 15-30 g of dextrose  15-30 g of dextrose oral PRN Joel Rodriguez MD        Or    glucagon (GLUCAGEN) injection 1 mg  1 mg intramuscular PRN Joel Rodriguez MD        Or    dextrose 50 % in water (D50) injection 12.5 g  25 mL intravenous PRN Joel Rodriguez MD        diphenhydrAMINE (BENADRYL) capsule 50 mg  50 mg oral Once Roxy Alan PA C        docusate sodium (COLACE) capsule 100 mg  100 mg oral Daily PRN Joel Rodriguez MD        ethacrynic acid (EDECRIN) tablet 50 mg  50 mg oral Daily Grant Watts DO   50 mg at 08/14/24 0835    honey (MEDIHONEY) 100 % topical paste   Topical Daily PRN Neslon Pratt DPM        [START ON 8/15/2024] levothyroxine (SYNTHROID) tablet 100 mcg  100 mcg oral Daily (6:30a) Simon Cano DO        lidocaine (ASPERCREME) 4 % topical patch 1 patch  1 patch Topical Daily Joel Rodriguez MD   1 patch at 08/13/24 0906    lidocaine (ASPERCREME) 4 % topical patch 1 patch  1 patch Topical Daily Neela Bender DO   1 patch at 08/14/24 0835    lidocaine (ASPERCREME) 4 % topical patch 1 patch  1 patch Topical Daily Amira Logan MD   1 patch at 08/14/24 0835    macitentan (OPSUMIT) tablet 10 mg (Patient Owned)  10 mg oral Daily Grant Watts DO   10 mg at 08/14/24 0836    melatonin ODT 3 mg  3 mg oral Nightly PRN Arleth Pastor CRNP   3 mg at 08/13/24 2303    ondansetron ODT (ZOFRAN-ODT) disintegrating tablet 4 mg  4 mg oral q8h PRN Molly Hidalgo CRNP        Or    ondansetron (ZOFRAN) injection 4 mg  4 mg intravenous q8h PRN Molly Hidalgo CRNP   4 mg at 08/13/24 1217    pantoprazole (PROTONIX) tablet,delayed release (DR/EC) 20 mg  20 mg oral Daily Joel Rodriguez MD   20 mg at 08/14/24 0846    peg 400-hypromellose-glycerin (ARTIFICIAL TEARS) 1-0.2-0.2 % eye drops 1 drop  1 drop Both Eyes q4h while awake Jerson Summers DO   1 drop at 08/14/24 0838    polyethylene glycol (MIRALAX) 17 gram packet  17 g  17 g oral BID Corinne Ortez DO   17 g at 08/11/24 2126    polyethylene glycol (MIRALAX) 17 gram packet 17 g  17 g oral 2x daily PRN Teri Castañeda MD        predniSONE (DELTASONE) tablet 50 mg  50 mg oral q6h INT Roxy Alan PA C   50 mg at 08/14/24 0544    senna (SENOKOT) tablet 2 tablet  2 tablet oral BID Teri Castañeda MD   2 tablet at 08/11/24 2127    sildenafiL (pulm.hypertension) (REVATIO) tablet 20 mg  20 mg oral TID Joel Rodriguez MD   20 mg at 08/14/24 0834    sodium chloride (OCEAN) 0.65 % nasal spray 2 spray  2 spray Each Nostril Daily Joel Rodriguez MD   2 spray at 08/14/24 0839    traMADoL (ULTRAM) tablet 25 mg  25 mg oral q6h PRN Molly Hidalgo CRNP        vancomycin 1 g/250 mL IVPB in NSS  20 mg/kg intravenous Once Gaetano Khalil MD        white petrolatum (AQUAPHOR) ointment   Topical q4h PRN Molly Hidalgo CRNP   Given at 08/13/24 2117    white petrolatum (AQUAPHOR) ointment   Topical BID Grant Watts DO   Given at 08/14/24 0839    white petrolatum-mineral oil (ARTIFICIAL TEARS OINT) ophthalmic ointment   Both Eyes Nightly Jerson Summers DO   Given at 08/13/24 2119       Review of Systems:   All other systems reviewed and are negative except as per HPI.    Physical Exam:     Wt Readings from Last 10 Encounters:   08/14/24 50.8 kg (111 lb 15.9 oz)   08/05/24 49.4 kg (109 lb)   08/05/24 49.4 kg (109 lb)   07/20/24 49.5 kg (109 lb 2 oz)   07/19/24 51.3 kg (113 lb)   06/20/24 51.7 kg (114 lb)   06/14/24 52.6 kg (116 lb)   05/23/24 52.4 kg (115 lb 9.6 oz)   04/17/24 53.5 kg (118 lb)   04/10/24 44.5 kg (98 lb)       BP Readings from Last 5 Encounters:   08/14/24 (!) 119/57   08/05/24 (!) 148/75   07/25/24 (!) 155/77   07/23/24 (!) 101/57   07/19/24 (!) 154/80       Vitals:    08/14/24 0745   BP: (!) 119/57   Pulse: 75   Resp: 18   Temp: 36.4 °C (97.6 °F)   SpO2: 96%       Constitutional: NAD, AAOx3  HENT: Normocephalic, atraumatic head, sclerae anicteric, no cervical lymphadenopathy,  trachea midline, facial swelling  Cardiovascular: RRR, no murmurs, rubs or gallops, JVP: 10 cm H2O   cm H2O, no carotid bruits.  Respiratory: CTA bilateral lung fields, no wheezes, rales or rhonchi  GI: soft, non-tender/non-distended  Musculoskeletal / Extremities: Bilateral hip edema noted, +2 pulses bilateral radial, DP/PT arteries, skin tightening on face and fingertips, ulceration and discoloration of 2nd phalange b/l LE.   Skin: no rashes. Facial changes c/w scleroderma  Neuro / Psych: no focal deficits, CNII-XII grossly intact, appropriate, cooperative    Diagnostic Data:     Component      Latest Ref Rng 7/20/2024 7/25/2024   Sodium      136 - 145 mEQ/L 142  139    Potassium, Bld      3.5 - 5.1 mEQ/L 4.2  4.3    Chloride      98 - 107 mEQ/L 107  103    CO2      21 - 31 mEQ/L 25  27    BUN      7 - 25 mg/dL 17  17    Creatinine      0.6 - 1.2 mg/dL 1.2  1.1    Glucose      70 - 99 mg/dL 85  86    Calcium      8.6 - 10.3 mg/dL 9.3  10.6 (H)    AST (SGOT)      13 - 39 IU/L  15    ALT (SGPT)      7 - 52 IU/L  8    Alkaline Phosphatase      34 - 125 IU/L  82    Total Protein      6.0 - 8.2 g/dL  8.8 (H)    Albumin      3.5 - 5.7 g/dL  4.3    Bilirubin, Total      0.3 - 1.2 mg/dL  0.4    eGFR      >=60.0 mL/min/1.73m*2 51.0 (L)  56.6 (L)    Anion Gap      3 - 15 mEQ/L 10  9       Component      Latest Ref Rng 7/20/2024 7/25/2024   WBC      3.80 - 10.50 K/uL 7.27  10.29    Hemoglobin      11.8 - 15.7 g/dL 9.8 (L)  11.2 (L)    Hematocrit      35.0 - 45.0 % 32.0 (L)  37.3    MCV      83.0 - 98.0 fL 80.4 (L)  80.4 (L)    Platelets      150 - 369 K/uL 206  331       Component      Latest Ref Rng 7/19/2024 7/25/2024 8/9/2024   High Sens Troponin I      <15.0 pg/mL 89.2 (HH)  66.8 (HH)  92.5 (HH)    High Sens Troponin I       90.3 (HH)  83.4 (HH)         Component      Latest Ref Rng 5/22/2024 6/22/2024 7/19/2024 7/25/2024   BNP      <=100 pg/mL 352 (H)  150 (H)  151 (H)  220 (H)       Component      Latest Ref Rng  4/16/2024 5/23/2024 7/21/2024 7/22/2024   Sed Rate      0 - 30 mm/hr 101 (H)  79 (H)  105 (H)  >119 (H)       Component      Latest Ref [facility] 4/18/2024 5/24/2024   WBC      3.80 - 10.50 K/uL 8.40  16.22 (H)    RBC      3.93 - 5.22 M/uL 3.54 (L)  3.82 (L)    Hemoglobin      11.8 - 15.7 g/dL 9.8 (L)  10.3 (L)    Hematocrit      35.0 - 45.0 % 32.8 (L)  33.9 (L)    Platelets      150 - 369 K/uL 254  289       Component      Latest Ref [facility] 5/24/2024   Magnesium      1.8 - 2.5 mg/dL 2.1      Component      Latest Ref [facility] 4/13/2023 3/7/2024 5/22/2024   TSH      0.34 - 5.60 mIU/L 4.71  0.28 (L)  1.03      Component      Latest Ref [facility] 5/24/2024   Sodium      136 - 145 mEQ/L 138    Potassium, Bld      3.5 - 5.1 mEQ/L 4.4    Chloride      98 - 107 mEQ/L 104    CO2      21 - 31 mEQ/L 24    BUN      7 - 25 mg/dL 40 (H)    Creatinine      0.6 - 1.2 mg/dL 1.1    Glucose      70 - 99 mg/dL 87    Calcium      8.6 - 10.3 mg/dL 9.5    eGFR      >=60.0 mL/min/1.73m*2 56.6 (L)    Anion Gap      3 - 15 mEQ/L 10      Component      Latest Ref [facility] 4/13/2023   Hemoglobin A1C      <5.7 % 4.4        Component      Latest Ref [facility] 4/14/2023   Triglycerides      30 - 149 mg/dL 44    Cholesterol      <=200 mg/dL 194    HDL      >=55 mg/dL 49 (L)    LDL Calculated      <=100 mg/dL 136 (H)    Non-HDL, Calculated      mg/dL 145    RISK      <=5.0  4.0       Component      Latest Ref [facility] 4/13/2023   High Sens Troponin I      <15.0 pg/mL 23.0 (H)       Component      Latest Ref [facility] 4/14/2023   Ferritin      11 - 250 ng/mL 75      Component      Latest Ref [facility] 4/14/2023   Iron      35 - 150 ug/dL 29 (L)    TIBC      270 - 460 ug/dL 245 (L)    UIBC      180 - 360 ug/dL 216    Iron Saturation      15 - 45 % 12 (L)        Component      Latest Ref Rng 6/27/2022 4/13/2023   BNP      <=100 pg/mL 111 (H)  105 (H)                          ---    ECG (8/2/2024, personally reviewed):   Sinus tachycardia   Left ventricular hypertrophy with repolarization  abnormality    HR: 117 bpm     ---        Lower extremity arterial duplex and ENRRIQUE August 10, 2024:  Right ankle ENRRIQUE (DP): 0.74.  Right ankle ENRRIQUE (PT): 0.88.  Left ankle ENRRIQUE (DP): 0.90.  Left ankle ENRRIQUE (PT): 0.62.    Findings concerning for hemodynamically significant stenosis within the  bilateral popliteal arteries and distal left femoral artery. There are also  findings which can be seen with inflow stenosis at the level left common femoral  artery. CTA of the abdomen and pelvis with lower extremity runoff could be  performed for further evaluation if clinically indicated.    CT Chest / Abdomen / Pelvis w/ Contrast (7/25/2024, personally reviewed):     Lung bases: Left-sided pleural effusion, left parenchymal consolidation, and  findings of interstitial lung disease.  More nodular density in the medial  aspect of the left lower lobe measuring 1.6 x 1.9 cm.  Cardiomegaly with a  pericardial effusion.     Liver: The liver is normal in size.  There is no focal mass.  Hepatic veins and  portal veins are patent without focal filling defects to suggest thrombosis..     Gallbladder:  Small dependent gallstones.  No gallbladder wall thickening..     Spleen: Spleen is normal in size without focal mass lesion..     Pancreas: The pancreas is normal without focal mass, parenchymal atrophy, or  pancreatic duct dilation..     Adrenals: The adrenals are normal bilaterally without focal mass..     Kidneys, ureters and bladder:  Symmetric enhancement and excretion..  Left lower  pole cystic lesion measures 3.2 cm in maximal dimension.  This measures greater  than simple fluid density.  This measured 60 Hounsfield units on the noncontrast  CT from 2020.  It measures 36 Hounsfield units on today's study.  Therefore it  is most in keeping with a hemorrhagic/proteinaceous cyst.  Additional  hypodensities too small to characterize.     Retroperitoneal structures: There is no abdominal aortic aneurysm.  Atherosclerotic calcification.   There is no retroperitoneal adenopathy or  retroperitoneal mass..     Bowel and mesentery: No evidence of a focal inflammatory or obstructive process.  Moderate fecal retention throughout the colon.  There are a few fluid-filled  small bowel loops in the pelvis, nonspecific.     Lymph nodes: No pathologic lymphadenopathy..     Pelvis: The uterus is normal in size.  The ovaries are normal.  There is no  adnexal mass or pelvic free fluid.     Bones: Within normal limits.    No evidence for pulmonary embolism.  Persistent left-sided pleural effusion and airspace disease at the left lung  base.  Underlying interstitial lung disease.     TTE (7/19/2024, personally reviewed):   Mild increased wall thickness with normal left ventricular cavity and preserved systolic function. EF 65%. No wall motion abnormalities.   Normal right ventricular size with preserved systolic function.   IVC normal in size with >50% respiratory variation consistent with normal right sided filling pressures.   Small pericardial effusion. No evidence of hemodynamic compromise with normal respiratory variation and no chamber collapse. Prominent epicardial fat.   Compared with previous study of 5/23/2024, small pericardial effusion persists.      PET/CT Skull-to-Thigh (6/4/2024):   An approximately 1.6 cm pulmonary nodule at the medial left lung base is  significantly FDG avid at max SUV 8.5.  This is nonspecific and can be seen in  the setting of benign pathology however malignancy is a concern.     There is more mild to moderate nonspecific uptake localizing to lita-fissural  nodules particularly on the left.     There is  moderate nonspecific left hilar and mediastinal dawson uptake.     No additional suspicious FDG avid findings appreciated.     CTA Chest (5/21/2024):   IMPRESSION:  1.  No evidence of acute pulmonary embolism.  2.  Multiple new perifissural left lower lobe nodules, suspicious for a  neoplastic process. PET/CT and or tissue sampling  is recommended.  3.  Chronic interstitial lung disease in an NSIP pattern, with overall slightly  progressed appearance since March 2023. Pulmonary consultation is recommended.  4.  Stable cardiomegaly and large pericardial effusion.  5.  Mediastinal and supraclavicular adenopathy, similar to prior study, also  indeterminate, which could be further evaluated at time of PET/CT.     CTA Chest (4/16/2024):   IMPRESSION:  1. No evidence of pulmonary embolism.  2. Stable cardiomegaly and large pericardial effusion. Cardiology consultation  suggested.  3. Emphysema with bilateral lower lobe groundglass opacity and bronchiectasis in  an NSIP pattern, which can be seen with scleroderma. Pulmonary consultation  suggested.  4. Stable 16 mm left lower lobe masslike density which may represent  atelectasis, lung cancer or lymphoma. When clinically appropriate PET/CT  suggested.  5. Stable prominence of the main pulmonary artery which can be seen with  pulmonary hypertension     TTE (5/23/2024, personally reviewed):  Mild increased wall thickness with normal left ventricular cavity and preserved systolic function. EF 65%. No wall motion abnormalities.   Normal right ventricular size with preserved systolic function.   Tiny IVC with >50% respiratory variation consistent with low-normal right sided filling pressures.   Small pericardial effusion, predominantly adjacent to right atrium. No evidence of hemodynamic compromise with normal respiratory variation and no chamber collapse. Prominent epicardial fat.   Compared with previous study of 4/10/24, small pericardial effusion persists.      TTE (4/10/2024, personally reviewed):  1. Normal left ventricular cavity size with mild concentric left ventricular hypertrophy. Normal systolic function. EF: 60%. No regional wall motion abnormalities. Cannot determine diastolic function. Global longitudinal strain not reported due to technical limitations of study.   2. Aortic valve has three  cusps. Trace aortic regurgitation. Aortic valve and root sclerosis.  3. Thickened mitral valve leaflets. Trace mitral valve regurgitation. Normal left atrial size.   4. Normal right ventricular structure and function. Trace tricuspid valve regurgitation with estimated RVSP: 51 mmHg (assuming right atrial pressure of 3 mmHg). Normal right atrial size. Pulmonic valve structurally normal. Trace pulmonic valve regurgitation. Pulsed wave Doppler of the RVOT systolic profile shows decreased acceleration times and geometry consistent with mildly elevated PVR.  5. IVC size is normal with > 50% inspiratory collapse consistent with normal right atrial pressure. Small-to-moderate sized, circumferential pericardial effusion. No echocardiographic evidence for cardiac tamponade.   6. Compared to previous study from 3/4/2024, no significant change. Pericardial effusion size is similar, estimated right atrial pressure lower.           CTA Abd / Pelvis (3/13/2024, personally reviewed):   There is dilatation of the of proximal/mid small bowel with relatively abrupt  transition left hemiabdomen, likely obstruction. Questionable lesion at the  transition point. This could be better evaluated with CT or MRI enterography.     Aorta and major branches patent. Superior mesenteric artery patent without  significant stenosis.  Suspect moderate stenosis of the origins of the of renal arteries bilaterally.     Large pericardial effusion, no change.     Patchy opacities lower lungs, similar to prior.      Coronary CTA (3/11/2024):   1. Two-vessel coronary artery disease as above that is moderate in severity.  CT  FFR is normal..  2. There is mitral annular calcification and aortic sclerosis.  The right atrium  is enlarged.  There is left ventricular hypertrophy.  There is a moderate to  large circumferential pericardial effusion.  3. Normal left ventricular wall motion and systolic function.  4. Coronary calcium score 313, 96th percentile      TTE (3/4/2024, personally reviewed):   Normal-sized left ventricle. Mild left ventricular hypertrophy. Preserved systolic function. LVEF 60%. No regional wall motion abnormalities. Grade II diastolic dysfunction.  Normal global longitudinal strain (-20%).  The aortic valve is not well seen.  Cannot determine the number of cusps.  Sclerotic leaflets.  No aortic stenosis.  Trace aortic insufficiency.  Normal sized aortic root.  The visualized portion of the ascending aorta is normal in size.  The mitral valve leaflets are thickened. Mild mitral annular calcification. Trace mitral regurgitation.   Mildly dilated left atrium.  Normal-sized right ventricle. Normal right ventricular systolic function.  Thickened tricuspid valve. There is mild tricuspid regurgitation with estimated RVSP of 46 mmHg.   Pulmonic valve is not well visualized. Trace pulmonic regurgitation.   Mildly dilated right atrium.  IVC is enlarged with <50% respiratory variation consistent with elevated right atrial filling pressure (at least 15 mmHg).   Moderate, circumferential, circumferential pericardial effusion.  The largest pocket is located inferolaterally.  Elevated right atrial pressure.  No other clear echocardiographic features of increased pericardial pressure.  Correlate clinically.   Compared to previous TTE from 6/21/2023, pericardial effusion now moderate in size.  Right atrial pressure now elevated.         CTA Chest (3/3/2024):   IMPRESSION:  1.  No evidence of acute pulmonary embolism to the level of the segmental  branches.  2.  Moderate to large pericardial effusion measuring higher than simple fluid  density.  3.  Lower lobe lung field predominant interstitial thickening with some relative  subpleural sparing, consistent with a chronic interstitial lung disease,  unchanged from the prior study.  4.  Continued bilateral axillary, mediastinal, and bilateral hilar  lymphadenopathy, not definitely changed from the prior study,  possibly related  to the patient's sarcoid.  5.  Emphysema.      CTA Chest PE (10/4/2023):  IMPRESSION:  1. No evidence for filling defect or vessel cutoff to suggest pulmonary  embolism.  Enlargement of the pulmonary arteries compatible with pulmonary  arterial hypertension.  2. Changes throughout the lungs as described above which can be seen with  systemic sclerosis/scleroderma.  Underlying pneumonia the lung bases cannot be  entirely excluded in the proper clinical setting.  3.  Additional stable findings as described above.        VQ (9/12/2023):  IMPRESSION:  Intermediate to high probability of pulmonary embolic disease.     6MWT (7/25/2023, on Sildenafil 20 mg TID):        TTE (6/21/2023, personally reviewed):  1. Normal left ventricular cavity size and mild concentric left ventricular hypertrophy. Normal systolic function. EF: 60%. No regional wall motion abnormalities. Grade I diastolic dysfunction.   2. Aortic valve cusps not well visualized. Trace aortic regurgitation. Aortic valve and root sclerosis.  3. Thickened mitral valve leaflets. Mild mitral annular calcification. Trace mitral regurgitation. Mildly dilated left atrial size.   4. Normal right ventricular structure and function. Mild tricuspid valve regurgitation with estimated RVSP: 55 mmHg (assuming right atrial pressure of 3 mmHg). Normal right atrial size. Pulmonic valve not well visualized. Trace pulmonic valve regurgitation. Pulsed wave Doppler of the RVOT systolic profile show decreased acceleration times ( msec) and late systolic notching consistent with elevated PVR.   5. IVC size is normal with > 50% inspiratory collapse consistent with normal right atrial pressure. Small pericardial effusion without echocardiographic evidence of tamponade.  6. Compared to previous study from 4/14/2023, estimated right ventricular systolic pressure is higher.        TTE (4/14/2023, personally reviewed):   The left ventricular cavity size is normal  with mild left ventricular hypertrophy and preserved systolic function. Estimated EF 65%. No regional wall motion abnormalities. Grade I diastolic dysfunction.     The aortic valve is tricuspid. Aortic valve sclerosis. Trace aortic regurgitation.      The visualized portions of the aortic root and ascending aorta are normal in size.     The mitral valve is mildly thickened. Mild mitral annular calcification. Trace mitral regurgitation.       The left atrium is severely dilated.      The right ventricle is normal in size with preserved systolic function     The pulmonic valve is not well seen.     The tricuspid valve is normal in appearance. mild tricuspid regurgitation with an estimated RVSP of 34 mmHg.       Right atrium is normal in size.     The IVC is small and collapses > 50% with inspiration consistent with normal right atrial pressures.     Small pericardial effusion, mostly posterior.       Compared to previous echocardiogram from 8/22/2022, there is no significant change        TTE (11/28/2022, personally reviewed):   Normal right- and mildly elevated left-sided filling pressures (RA: 3 mmHg, PCWP: 13 mmHg)  Elevated pulmonary artery pressures (60/22(35 mmHg)) in the setting of PCWP: 13 mmHg.   Normal cardiac output and index (CO: 5.1 L/min, CI: 3.2 L/min/m2)  PVR: 4.3 Wood units. SVR: 1840 dynes/sec/cm5      TTE (8/22/2022, personally reviewed):  Normal-sized LV. Normal LV systolic function. Estimated EF 55- 60%. Wall motion appears grossly normal. Mild concentric left ventricular hypertrophy. Grade I LV diastolic dysfunction.  Pulmonic valve not well visualized. Grossly normal pulmonic valve structure. Trace pulmonic valve regurgitation. No pulmonic valve stenosis.  Aortic valve not well visualized. Aortic valve not well seen but likely trileaflet. Focal aortic valve calcification present. Sclerotic aortic valve leaflets. Mild aortic valve regurgitation. No aortic valve stenosis.  RV not well visualized.  Normal-sized RV. Normal RV systolic function.  Normal-sized RA.  There is a small loculated pericardial effusion anterior to the right atrium. No cardiac tamponade.  Sclerotic mitral valve. Mitral valve calcification of the anterior leaflet present.  Trace mitral valve regurgitation.  No significant mitral valve stenosis.  Normal-sized IVC. IVC demonstrates normal respiratory collapse.  Tricuspid valve structure is grossly normal. Mild to moderate tricuspid valve regurgitation.  Estimated RVSP = 50-55 mmHg.  Mildly dilated LA.  No previous echocardiogram available for comparison.     TTE (4/21/2022, outside study):  This result has an attachment that is not available.     A complete transthoracic echocardiogram (including 2D, color flow   Doppler, spectral Doppler and M-mode imaging) was performed using the   standard protocol. The study quality was adequate.     The left ventricle is normal in size. There is moderately increased   wall thickness consistent with moderate concentric hypertrophy.   Endocardium is incompletely visualized, but no regional wall motion   abnormalities are noted in the visualized segments. Normal left   ventricular ejection fraction. The left ventricular ejection fraction by   visual estimate is 55% to 60%.     The right ventricle is normal in size. There is normal function of the   right ventricle.     The left atrium is normal in size. Left atrial volume is 58 mL.     The right atrial volume measures 52 mL. The right atrial volume index   measures 34 mL/m2.     There is trace mitral regurgitation. There is no mitral stenosis.     The aortic valve is trileaflet. There is mild aortic valve thickening.   There is no aortic stenosis. There is trace aortic regurgitation.     There is mild to moderate tricuspid regurgitation. Pulmonary artery   systolic pressure measures 52 mmHg. The pulmonary artery systolic pressure   is moderately elevated.     The aorta measures 3.2 cm at the sinus of  Valsalva. The proximal   segment of the ascending aorta is mildly enlarged. The proximal segment of   the ascending aorta measures 3.4 cm. The proximal segment of the ascending   aorta measures 2.2 cm/m2, indexed for body surface area.     Trivial pericardial effusion present. There is no echocardiographic   evidence of cardiac tamponade.     The inferior vena cava is normal in size (diameter <21 mm) and   decreases >50% in size with inspiration, suggesting a normal right atrial   pressure of 3 mmHg (range 0-5 mm Hg).     Compared to the prior study of 3/13/2020, there are no significant   changes.        PFT (3/23/2022):      PFT (7/14/2021):      CT Chest (5/2/2021, outside study):  CLINICAL INFORMATION: Systemic sclerosis. Interstitial lung disease. Groundglass nodule     PROCEDURE: Unenhanced CT of the chest was performed using thin section reconstructions. Images were obtained on inspiration and expiration.     COMPARISON: June and August 2020     FINDINGS:     LUNGS, PLEURA:     Groundglass opacities with reticulation with subpleural sparing in the lower lobes, without honeycombing or architectural distortion, unchanged.     Right upper lobe groundglass nodule, image 114 of series 3, 8 x 11 mm, previously 6 mm.     Expiratory images are of diagnostic quality and do not demonstrate evidence of clinically significant air trapping.     CARDIOVASCULAR, MEDIASTINUM, THYROID: Coronary calcifications. Trace pericardial effusion.     LYMPH NODES: Subcentimeter mediastinal lymph nodes, unchanged. Unchanged axillary lymph nodes.     SKELETON, CHEST WALL: No destructive bone lesion     UPPER ABDOMEN: Patulous esophagus. 3 mm right renal stone.       RHC (9/02/2020):  RA   8 mmHg   RV   40/5 mmHg   PA (mean)  44/16 (25) mmHg   PCWP   12 mmHg   CO   4.65 lpm (Thermodilution)   CI   2.65 lpm/m-squared   SVI    33 ml/beat/m-squared (lower limit normal 29)   PVR   2.8 mmHg/l/min (Wood units)   SVR   2064 dyne-sec-cm-5          PFT (3/18/2020):      TTE (3/13/2020, outside study):  · The study quality was good.   · The left ventricle is normal in size. Top normal left ventricular wall   thickness. There are no segmental wall motion abnormalities. The left   ventricular ejection fraction is 55-60% by visual estimate and 3D   echocardiography. Normal left ventricular ejection fraction.   · There is grade I (mild) LV diastolic dysfunction.   · The right ventricle is normal in size. There is normal function of the   right ventricle.   · The right atrium is mildly dilated.   · There is mild mitral valve anterior leaflet thickening. There is mild   mitral valve leaflet calcification. There is flattening of the mitral   leaflets without raphael prolapse. There is trace mitral regurgitation.   · The aortic valve is trileaflet. There is mild thickening of the aortic   valve. There is mild calcification of the aortic valve. There is no aortic   /stenosis. There is trace aortic regurgitation.   · There is mild tricuspid valve leaflet thickening. There is mild to   moderate tricuspid regurgitation. The pulmonary artery systolic pressure   is moderately elevated. Pulmonary artery systolic pressure measures 50   mmHg. There is mid-systolic notching of the RVOT VTI consistent with   elevated pulmonary vascular resistance.   · The inferior vena cava is normal in size (diameter <21 mm) and decreases   >50% in size with inspiration, suggesting a normal right atrial pressure   of 3 mmHg (range 0-5 mm Hg).   · Trivial loculated pericardial effusion present adjacent to the right   ventricle and adjacent to the right atrium.          Assessment / Plan:     1. Chest Pain:  - Given pattern and character, I believe this is musculoskeletal / serous pain from her autoimmune disease. There may be an element of myopericarditis.  - She reports that pain has not responded to increased in steroids in the past   - Non-ACS chest pain and known non-obstructive CAD as  recently as March 2024 CCTA.   -A lidocaine patch was placed just prior to me entering the room.  Hopefully this will help her pain.    2. Pulmonary Hypertension (WHO Group I, NYHA/WHO FC III):   - Occurring in the background of Systemic Scleroderma with ILD. August 2022 TTE showed normal RV size and function, but progressive exertional decline, worsened DLCO and resting tachycardia suggest there may be progression of her pulmonary vascular disease and limitation of cardiac output. This was proven with 11/2022 RHC showing mean PA 35 mmHg (with PCWP 13 mmHg) and PVR 4.3 Wood units. Recent TTEs have shown appropriate RV:PA coupling with normal RV size and systolic function.   - She is hypervolemic on exam  - CXR improved today  -She was switched to ethacrynic acid 50 mg p.o. daily yesterday.  This is higher than home dose so hopefully will achieve diuresis.  Please start daily standing weights, discussed with RN.  Please start recording I's and O's.  - Continue Sildenafil 20 mg TID   - She should continue uninterrupted Macitentan 10 mg daily -her home medication has been brought in and she is receiving it.    3. HTN:   - Continue Amlodipine.     4. Systemic Scleroderma / Raynaud's Disease:   - Per Rheumatology (Dr. Trevizo).      5. ILD:   - Per Pulmonology and Rheumatology (Dr. Trevizo). She has not tolerated trials of Mycophenolate. She briefly received Tocilizumab.     6. PE:   - She has had elevated probability on V/Q scans, but CTA Chest has consistently not shown evidence for acute or chronic thromboembolism. Apixaban has been discontinued.     7. CAD:   - Two-vessel non-obstructive disease on March 2024 CCTA. She is continued on Atorvastatin 40 mg daily.     8. Toe Ulcerations  - Per Rheumatology, unlikely to see LE ulcerations with scleroderma  -Results of ultrasound noted.  Vascular surgery following, plans noted for lower extremity angiogram today.    9. Lung Mass  - Pathology from 8/8 biopsy with atypical  glandular cells, possibly consistent with adenocarcinoma  - Management per pulmonology    Ana Huang, KARYNA  8/14/2024

## 2024-08-14 NOTE — POST-OP (NON-BILLABLE)
[facility] (Vascular). Pager: [phone]   POST OP NOTE      Subjective   63 y.o. F a/f volume overload + pain control (s/p EBUS LLL lung nodule 8/8) c/f bilat pop and distal fem stenosis. S/p LLE angio, balloon angio of distal SFA, TP trunk, DCB angio of popliteal artery on 8/14.       Interval History: TOYA. VSS. Was seen resting comfortably in bed. Tolerating reg diet without nausea or vomiting. Endorses pain is well controlled on her current medications. Has no other complaints at this time. Admits to some soreness around her right groin site; however, denies chest pain, palpitations, abdominal pain, lower extremity pain, SOB, trouble breathing, nausea, or vomiting,     Objective     Vital signs in last 24 hours:  Temp:  [36.3 °C (97.4 °F)-37.2 °C (99 °F)] 36.4 °C (97.6 °F)  Heart Rate:  [] 68  Resp:  [14-20] 18  BP: (114-145)/(56-74) 136/69     Intake/Output Summary (Last 24 hours) at 8/14/2024 1806  Last data filed at 8/14/2024 0800  Gross per 24 hour   Intake 0 ml   Output --   Net 0 ml      Intake/Output this shift:  No intake/output data recorded.    Labs:  BMP Results         08/13/24 08/09/24 07/25/24     0958 1938 1420     140 139    K 3.8 3.6 4.3    Cl 106 106 103    CO2 29 24 27    Glucose 113 97 86    BUN 23 18 17    Creatinine 1.0 1.3 1.1    Calcium 9.4 9.5 10.6    Anion Gap 6 10 9    EGFR >60.0 46.3 56.6           Comment for K at 1420 on 07/25/24: Results obtained on plasma. Plasma Potassium values may be up to 0.4 mEQ/L less than serum values. The differences may be greater for patients with high platelet or white cell counts.    Comment for EGFR at 0958 on 08/13/24: Calculation based on the Chronic Kidney Disease Epidemiology Collaboration (CKD-EPI) equation refit without adjustment for race.    Comment for EGFR at 1938 on 08/09/24: Calculation based on the Chronic Kidney Disease Epidemiology Collaboration (CKD-EPI) equation refit without adjustment for race.    Comment for EGFR at  1420 on 07/25/24: Calculation based on the Chronic Kidney Disease Epidemiology Collaboration (CKD-EPI) equation refit without adjustment for race.           CBC Results         08/13/24 08/09/24 07/25/24     0958 1938 1420    WBC 5.64 9.38 10.29    RBC 4.22 4.49 4.64    HGB 10.3 10.7 11.2    HCT 34.3 36.7 37.3    MCV 81.3 81.7 80.4    MCH 24.4 23.8 24.1    MCHC 30.0 29.2 30.0     285 331          Imaging:  I have reviewed the Imaging from the last 24 hrs.  No results found.      Physical Exam:  General appearance: alert, appears stated age and cooperative, resting comfortably in bed  HEENT: normocephalic, no JVD, trachea midline, atraumatic  Lungs: No increased work of breathing. 97% 2L NC (on 2L at home)  Chest wall: no tenderness  Heart: HR: 102 BP: 135/63  Abdomen: soft, non-tender, non-distended   Extremities: right groin site without hematoma, covered with dermabond, appropriately tender to palpation; left anterior ankle wound dressing present, right later ankle wound dressing present  Pulses: b/l dopplerable DP, AT, PT; b/l palpable femoral pulses      Assessment/Plan   63 y.o. female s/p LLE angio, balloon angio of distal SFA, TP trunk, DCB angio of popliteal artery on 8/14. Day of Surgery     Diet: reg diet +nutrition supplements   Pain Control: standing tylenol 975mg q6h  Cardiac: c/w amlodipine 10mg PO QD, atorvastatin 40mg PO QD, macitentan 10mg PO QD, sidenafil 20mg PO TID, ethacynic acid 50mg PO QD  Pulm: continue on 2L O2, patient on 2L of O2 at home  GI: c/w bowel reg- colace, miralax, senna; zofran, protonix   Heme: plavix 150mg PO on 8/14, decrease dose to 75mg on 8/15  MSK: flat until 5pm 8/14, then only light walking  Dispo: stable for floor; rest of care per primary team    Tiffanie Kilpatrick DO  General Surgery Resident  Please page *[phone] with any questions or concerns.

## 2024-08-14 NOTE — OR SURGEON
Pre-Procedure patient identification:  I am the primary operating surgeon/proceduralist and I have reviewed the applicable pathology reports and radiology studies for this procedure. I have identified the patient on 08/14/24 at 12:49 PM Rashel Richards MD [facility]  Phone Number: [phone]

## 2024-08-14 NOTE — PLAN OF CARE
Medication Samples    Medication:  apixaban (ELIQUIS)     Dosage of the medication:  5 MG TABS     How are you taking this medication (QD, BID, TID, QID, PRN):  Take 1 tablet by mouth 2 times daily      in the office or Mail to your home?   in office    Brooke callback: [phone]                    Plan of Care Review  Plan of Care Reviewed With: patient  Progress: no change  Outcome Evaluation: pt aaox4, assistx1 oob. refusing tramadol d/t endorsing palpitation s/p med adminstration. Tyelnol and PRN flexiril for pain management. Plan for angiogram/plasty tmw. no further needs at this time.

## 2024-08-14 NOTE — ANESTHESIA PREPROCEDURE EVALUATION
Relevant Problems   CARDIOVASCULAR   (+) Acute on chronic heart failure with preserved ejection fraction (CMS/HCC)   (+) CAD (coronary artery disease)   (+) Essential (primary) hypertension   (+) Pulmonary hypertension (CMS/HCC)      GASTROINTESTINAL   (+) GERD (gastroesophageal reflux disease)      HEMATOLOGY   (+) Anemia      NEUROLOGY   (+) Anxious appearance   (+) Vertigo      RESPIRATORY SYSTEM   (+) Dyspnea   (+) Shortness of breath      Other   (+) Hypothyroid     63 y.o. female with a PMH significant for severe scleroderma complicated by pulmonary hypertension, ILD. She presented to [facility] [place] on 8[place]8 for planned robotic navigational and EBUS bronchoscopies to evaluate growing LLL nodule c/b chest pain and volume overload. Gangrene of L foot.    Now for angio, fem/pop bypass.    Prev airway  Difficult Airway: Yes   Final Airway Type: endotracheal airway   Mask Difficulty Assessment: 3 - difficult mask (inadequate, unstable or two providers) +/- NMBA   Final Endotracheal Airway: ETT   Cormack-Lehane Classification: grade I - full view of glottis   Technique Used For Successful Placement: video laryngoscopy   Devices/Methods Used in Placement: intubating stylet   Insertion Site: oral   Blade Type: Celina   Blade Size: 4   ETT Size (mm): 8.0   Number of Attempts at Approach: 1     ROS/Med Hx     Past Surgical History:   Procedure Laterality Date    CARDIAC CATHETERIZATION      COLONOSCOPY      HERNIA REPAIR      THYROIDECTOMY         Physical Exam    Airway   Mallampati: III  Cardiovascular    Rhythm: regular   Rate: normalPulmonary    Decreased breath sounds  Dental    Teeth Problems: upper dentures        CBC Results         08/13/24 08/09/24 07/25/24 0958 1938 1420    WBC 5.64 9.38 10.29    RBC 4.22 4.49 4.64    HGB 10.3 10.7 11.2    HCT 34.3 36.7 37.3    MCV 81.3 81.7 80.4    MCH 24.4 23.8 24.1    MCHC 30.0 29.2 30.0     285 331          CMP Results         08/13/24 08/09/24 07/25/24 0958 1938  1420     140 139    K 3.8 3.6 4.3    Cl 106 106 103    CO2 29 24 27    Glucose 113 97 86    BUN 23 18 17    Creatinine 1.0 1.3 1.1    Calcium 9.4 9.5 10.6    Anion Gap 6 10 9    AST -- -- 15    ALT -- -- 8    Albumin -- -- 4.3    EGFR >60.0 46.3 56.6           Comment for K at 1420 on 07/25/24: Results obtained on plasma. Plasma Potassium values may be up to 0.4 mEQ/L less than serum values. The differences may be greater for patients with high platelet or white cell counts.    Comment for EGFR at 0958 on 08/13/24: Calculation based on the Chronic Kidney Disease Epidemiology Collaboration (CKD-EPI) equation refit without adjustment for race.    Comment for EGFR at 1938 on 08/09/24: Calculation based on the Chronic Kidney Disease Epidemiology Collaboration (CKD-EPI) equation refit without adjustment for race.    Comment for EGFR at 1420 on 07/25/24: Calculation based on the Chronic Kidney Disease Epidemiology Collaboration (CKD-EPI) equation refit without adjustment for race.          TTE 7/2024  Mild increased wall thickness with normal left ventricular cavity and preserved systolic function. EF 65%. No wall motion abnormalities.   Normal right ventricular size with preserved systolic function.   IVC normal in size with >50% respiratory variation consistent with normal right sided filling pressures.   Small pericardial effusion. No evidence of hemodynamic compromise with normal respiratory variation and no chamber collapse. Prominent epicardial fat.   Compared with previous study of 5/23/2024, small pericardial effusion persists.    Anesthesia Plan    Plan: MAC    Technique: total IV anesthesia and MAC     Lines and Monitors: PIV, arterial line and BIS     Airway: video laryngoscope    3 ASA  Blood Products:     Use of Blood Products Discussed: Yes     Consented to blood products  Anesthetic plan and risks discussed with: patient  Induction:    intravenous   Postop Plan:   Patient Disposition: inpatient floor  planned admission   Pain Management: IV analgesics  Comments:    Plan: Risks and benefits of general anesthesia discussed with patient at beside pre-operatively. Risks include but are not limited to sore throat, pneumonia, risk of injury to lips/teeth/mouth/throat, risk of possible arterial line (risk of infection/bleeding/injury to blood vessel or nerves), risk of injury to any organ in the body including heart/kidneys/liver/lungs/brain/etc. Given existing comorbidities including CHF, CAD, ILD, pHTN, discussed increased risk of cardiaopulmonary events (such as MI, cardiac arrest). All questions answered, and the patient states understanding of anesthesia plan and wishes to proceed.

## 2024-08-14 NOTE — BRIEF OP NOTE
ANGIOGRAM/ANGIOPLASTY FEMORAL , POSS. STENT/COIL/FEM-POP (B) Procedure Note    Procedure:    ANGIOGRAM/ANGIOPLASTY FEMORAL , POSS. STENT/COIL/FEM-POP  CPT(R) Code:  99913 - CHG ANGIO EXTERMITY BILAT      Pre-op Diagnosis     * Scleroderma (CMS/HCC) [M34.9]     * Multiple opens wound of lower extremity, unspecified laterality, sequela [S81.809S]     * Peripheral arterial disease (CMS/HCC) [I73.9]       Post-op Diagnosis     * Scleroderma (CMS/HCC) [M34.9]     * Multiple opens wound of lower extremity, unspecified laterality, sequela [S81.809S]     * Peripheral arterial disease (CMS/HCC) [I73.9]    Surgeon(s) and Role:     * Rashel Richards MD FACS - Primary    Anesthesia: General    Staff:   Circulator: Regla Narayanan RN; Debo Tracy RN  Scrub Person: Gwendolyn Sierra RN; Hakan Ortega RN    Procedure Details   LLE angiogram, balloon angioplasty of distal SFA, TP trunk, DCB angioplasty of popliteal artery    PATOS (Infection Present at Time of Surgery):    No    Estimated Blood Loss: No blood loss documented.    Specimens:                No specimens collected during this procedure.      Drains:   [REMOVED] External Urinary Catheter Female (one size) (Removed)       Implants: * No implants in log *           Complications:  None; patient tolerated the procedure well.           Disposition: PACU - hemodynamically stable.           Condition: stable    Rashel Richards MD FACS  Phone Number: [phone]

## 2024-08-14 NOTE — PLAN OF CARE
Problem: Adult Inpatient Plan of Care  Goal: Patient-Specific Goal (Individualized)  Outcome: Progressing  Flowsheets (Taken 8/14/2024 1245)  Patient/Family-Specific Goals (Include Timeframe): decreased pain this shift  Individualized Care Needs: assist OOB, pain management, tele monitor  Anxieties, Fears or Concerns: concerned about continued pain   Plan of Care Review  Plan of Care Reviewed With: patient  Progress: improving  Outcome Evaluation: pt AAOX4, c/o continued pain L chest. lidoderm patch applied as ordered. pt receiving ATC dosing of tylenol along with prn flexeril. heating pad in place. pt in OR for angioplasty with poss stent. IV dye allergy prep given as ordered. SR on monitor. ST HR up to 120s when OOB. pt OOB with assist x 1. pox stable on 2 liters. facial swelling improved and pt denies blurred vision.

## 2024-08-15 ENCOUNTER — APPOINTMENT (INPATIENT)
Dept: RADIOLOGY | Facility: HOSPITAL | Age: 64
DRG: 515 | End: 2024-08-15
Attending: NURSE PRACTITIONER
Payer: COMMERCIAL

## 2024-08-15 PROBLEM — R10.9 ABDOMINAL PAIN: Status: ACTIVE | Noted: 2024-08-15

## 2024-08-15 LAB
ABO + RH BLD: NORMAL
ALBUMIN SERPL-MCNC: 3.5 G/DL (ref 3.5–5.7)
ALP SERPL-CCNC: 50 IU/L (ref 34–125)
ALT SERPL-CCNC: 14 IU/L (ref 7–52)
ANION GAP SERPL CALC-SCNC: 10 MEQ/L (ref 3–15)
AST SERPL-CCNC: 18 IU/L (ref 13–39)
BASOPHILS # BLD: 0.05 K/UL (ref 0.01–0.1)
BASOPHILS # BLD: 0.07 K/UL (ref 0.01–0.1)
BASOPHILS NFR BLD: 0.5 %
BASOPHILS NFR BLD: 0.6 %
BILIRUB DIRECT SERPL-MCNC: 0.1 MG/DL
BILIRUB SERPL-MCNC: 0.3 MG/DL (ref 0.3–1.2)
BLD GP AB SCN SERPL QL: NEGATIVE
BUN SERPL-MCNC: 47 MG/DL (ref 7–25)
CALCIUM SERPL-MCNC: 9 MG/DL (ref 8.6–10.3)
CHLORIDE SERPL-SCNC: 103 MEQ/L (ref 98–107)
CO2 SERPL-SCNC: 26 MEQ/L (ref 21–31)
CREAT SERPL-MCNC: 1.4 MG/DL (ref 0.6–1.2)
D AG BLD QL: POSITIVE
DIFFERENTIAL METHOD BLD: ABNORMAL
DIFFERENTIAL METHOD BLD: ABNORMAL
EGFRCR SERPLBLD CKD-EPI 2021: 42.4 ML/MIN/1.73M*2
EOSINOPHIL # BLD: 0.02 K/UL (ref 0.04–0.36)
EOSINOPHIL # BLD: 0.1 K/UL (ref 0.04–0.36)
EOSINOPHIL NFR BLD: 0.2 %
EOSINOPHIL NFR BLD: 1 %
ERYTHROCYTE [DISTWIDTH] IN BLOOD BY AUTOMATED COUNT: 18.7 % (ref 11.7–14.4)
ERYTHROCYTE [DISTWIDTH] IN BLOOD BY AUTOMATED COUNT: 18.8 % (ref 11.7–14.4)
GLUCOSE SERPL-MCNC: 99 MG/DL (ref 70–99)
HCT VFR BLD AUTO: 28.7 % (ref 35–45)
HCT VFR BLD AUTO: 29.9 % (ref 35–45)
HGB BLD-MCNC: 8.7 G/DL (ref 11.8–15.7)
HGB BLD-MCNC: 8.7 G/DL (ref 11.8–15.7)
IMM GRANULOCYTES # BLD AUTO: 0.04 K/UL (ref 0–0.08)
IMM GRANULOCYTES # BLD AUTO: 0.04 K/UL (ref 0–0.08)
IMM GRANULOCYTES NFR BLD AUTO: 0.3 %
IMM GRANULOCYTES NFR BLD AUTO: 0.4 %
LABORATORY COMMENT REPORT: NORMAL
LIPASE SERPL-CCNC: 23 U/L (ref 11–82)
LYMPHOCYTES # BLD: 1.39 K/UL (ref 1.2–3.5)
LYMPHOCYTES # BLD: 1.85 K/UL (ref 1.2–3.5)
LYMPHOCYTES NFR BLD: 11.4 %
LYMPHOCYTES NFR BLD: 17.8 %
MAGNESIUM SERPL-MCNC: 2.3 MG/DL (ref 1.8–2.5)
MCH RBC QN AUTO: 24 PG (ref 28–33.2)
MCH RBC QN AUTO: 24.1 PG (ref 28–33.2)
MCHC RBC AUTO-ENTMCNC: 29.1 G/DL (ref 32.2–35.5)
MCHC RBC AUTO-ENTMCNC: 30.3 G/DL (ref 32.2–35.5)
MCV RBC AUTO: 79.5 FL (ref 83–98)
MCV RBC AUTO: 82.4 FL (ref 83–98)
MONOCYTES # BLD: 0.79 K/UL (ref 0.28–0.8)
MONOCYTES # BLD: 0.97 K/UL (ref 0.28–0.8)
MONOCYTES NFR BLD: 7.6 %
MONOCYTES NFR BLD: 7.9 %
NEUTROPHILS # BLD: 7.56 K/UL (ref 1.7–7)
NEUTROPHILS # BLD: 9.73 K/UL (ref 1.7–7)
NEUTS SEG NFR BLD: 72.7 %
NEUTS SEG NFR BLD: 79.6 %
NRBC BLD-RTO: 0 %
NRBC BLD-RTO: 0 %
PDW BLD AUTO: 11.3 FL (ref 9.4–12.3)
PDW BLD AUTO: 11.4 FL (ref 9.4–12.3)
PHOSPHATE SERPL-MCNC: 4.4 MG/DL (ref 2.4–4.7)
PLATELET # BLD AUTO: 203 K/UL (ref 150–369)
PLATELET # BLD AUTO: 205 K/UL (ref 150–369)
POTASSIUM SERPL-SCNC: 4.3 MEQ/L (ref 3.5–5.1)
PROT SERPL-MCNC: 6.2 G/DL (ref 6–8.2)
RBC # BLD AUTO: 3.61 M/UL (ref 3.93–5.22)
RBC # BLD AUTO: 3.63 M/UL (ref 3.93–5.22)
SODIUM SERPL-SCNC: 139 MEQ/L (ref 136–145)
SPECIMEN EXP DATE BLD: NORMAL
WBC # BLD AUTO: 10.39 K/UL (ref 3.8–10.5)
WBC # BLD AUTO: 12.22 K/UL (ref 3.8–10.5)

## 2024-08-15 PROCEDURE — 86850 RBC ANTIBODY SCREEN: CPT

## 2024-08-15 PROCEDURE — 84100 ASSAY OF PHOSPHORUS: CPT | Performed by: INTERNAL MEDICINE

## 2024-08-15 PROCEDURE — 83735 ASSAY OF MAGNESIUM: CPT | Performed by: INTERNAL MEDICINE

## 2024-08-15 PROCEDURE — 74176 CT ABD & PELVIS W/O CONTRAST: CPT

## 2024-08-15 PROCEDURE — 63700000 HC SELF-ADMINISTRABLE DRUG: Performed by: INTERNAL MEDICINE

## 2024-08-15 PROCEDURE — 36415 COLL VENOUS BLD VENIPUNCTURE: CPT | Performed by: INTERNAL MEDICINE

## 2024-08-15 PROCEDURE — 99233 SBSQ HOSP IP/OBS HIGH 50: CPT | Performed by: INTERNAL MEDICINE

## 2024-08-15 PROCEDURE — 63600000 HC DRUGS/DETAIL CODE: Mod: JZ | Performed by: INTERNAL MEDICINE

## 2024-08-15 PROCEDURE — 99232 SBSQ HOSP IP/OBS MODERATE 35: CPT | Performed by: INTERNAL MEDICINE

## 2024-08-15 PROCEDURE — 200200 PR NO CHARGE: Performed by: INTERNAL MEDICINE

## 2024-08-15 PROCEDURE — 21400000 HC ROOM AND CARE CCU/INTERMEDIATE

## 2024-08-15 PROCEDURE — 85025 COMPLETE CBC W/AUTO DIFF WBC: CPT | Performed by: INTERNAL MEDICINE

## 2024-08-15 PROCEDURE — 83690 ASSAY OF LIPASE: CPT | Performed by: NURSE PRACTITIONER

## 2024-08-15 PROCEDURE — 80048 BASIC METABOLIC PNL TOTAL CA: CPT | Performed by: INTERNAL MEDICINE

## 2024-08-15 PROCEDURE — 80076 HEPATIC FUNCTION PANEL: CPT | Performed by: NURSE PRACTITIONER

## 2024-08-15 RX ORDER — ETHACRYNIC ACID 25 MG/1
50 TABLET ORAL ONCE
Status: DISCONTINUED | OUTPATIENT
Start: 2024-08-15 | End: 2024-08-15

## 2024-08-15 RX ORDER — PANTOPRAZOLE SODIUM 40 MG/10ML
40 INJECTION, POWDER, LYOPHILIZED, FOR SOLUTION INTRAVENOUS EVERY 12 HOURS
Status: DISCONTINUED | OUTPATIENT
Start: 2024-08-15 | End: 2024-08-18

## 2024-08-15 RX ADMIN — SALINE NASAL SPRAY 2 SPRAY: 1.5 SOLUTION NASAL at 08:07

## 2024-08-15 RX ADMIN — LIDOCAINE 4% 1 PATCH: 40 PATCH TOPICAL at 08:05

## 2024-08-15 RX ADMIN — SILDENAFIL 20 MG: 20 TABLET, FILM COATED ORAL at 15:32

## 2024-08-15 RX ADMIN — GLYCERIN 1 DROP: .002; .002; .01 SOLUTION/ DROPS OPHTHALMIC at 04:42

## 2024-08-15 RX ADMIN — WHITE PETROLATUM: 1.75 OINTMENT TOPICAL at 21:18

## 2024-08-15 RX ADMIN — CYCLOBENZAPRINE HYDROCHLORIDE 5 MG: 5 TABLET, FILM COATED ORAL at 21:17

## 2024-08-15 RX ADMIN — PANTOPRAZOLE SODIUM 20 MG: 20 TABLET, DELAYED RELEASE ORAL at 08:04

## 2024-08-15 RX ADMIN — MINERAL OIL, WHITE PETROLATUM: .03; .94 OINTMENT OPHTHALMIC at 21:20

## 2024-08-15 RX ADMIN — Medication 3 MG: at 21:16

## 2024-08-15 RX ADMIN — LEVOTHYROXINE SODIUM 100 MCG: 0.1 TABLET ORAL at 06:33

## 2024-08-15 RX ADMIN — ACETAMINOPHEN 975 MG: 325 TABLET ORAL at 23:16

## 2024-08-15 RX ADMIN — GLYCERIN 1 DROP: .002; .002; .01 SOLUTION/ DROPS OPHTHALMIC at 15:33

## 2024-08-15 RX ADMIN — ONDANSETRON 4 MG: 2 INJECTION INTRAMUSCULAR; INTRAVENOUS at 03:33

## 2024-08-15 RX ADMIN — DEXTRAN 70, GLYCERIN, HYPROMELLOSE 1 DROP: 1; 2; 3 SOLUTION/ DROPS OPHTHALMIC at 08:07

## 2024-08-15 RX ADMIN — BENZOCAINE 6 MG-MENTHOL 10 MG LOZENGES 1 LOZENGE: at 21:23

## 2024-08-15 RX ADMIN — ONDANSETRON 4 MG: 4 TABLET, ORALLY DISINTEGRATING ORAL at 21:16

## 2024-08-15 RX ADMIN — CLOPIDOGREL 75 MG: 75 TABLET ORAL at 08:03

## 2024-08-15 RX ADMIN — CYCLOBENZAPRINE HYDROCHLORIDE 5 MG: 5 TABLET, FILM COATED ORAL at 04:40

## 2024-08-15 RX ADMIN — ACETAMINOPHEN 975 MG: 325 TABLET ORAL at 04:40

## 2024-08-15 RX ADMIN — GLYCERIN 1 DROP: .002; .002; .01 SOLUTION/ DROPS OPHTHALMIC at 08:06

## 2024-08-15 RX ADMIN — BENZONATATE 100 MG: 100 CAPSULE ORAL at 08:04

## 2024-08-15 RX ADMIN — SILDENAFIL 20 MG: 20 TABLET, FILM COATED ORAL at 21:16

## 2024-08-15 RX ADMIN — ACETAMINOPHEN 975 MG: 325 TABLET ORAL at 16:57

## 2024-08-15 RX ADMIN — LIDOCAINE 4% 1 PATCH: 40 PATCH TOPICAL at 08:06

## 2024-08-15 RX ADMIN — ACETAMINOPHEN 975 MG: 325 TABLET ORAL at 12:07

## 2024-08-15 RX ADMIN — ETHACRYNIC ACID 50 MG: 25 TABLET ORAL at 08:05

## 2024-08-15 RX ADMIN — CYCLOBENZAPRINE HYDROCHLORIDE 5 MG: 5 TABLET, FILM COATED ORAL at 12:07

## 2024-08-15 RX ADMIN — ONDANSETRON 4 MG: 2 INJECTION INTRAMUSCULAR; INTRAVENOUS at 12:06

## 2024-08-15 RX ADMIN — WHITE PETROLATUM: 1.75 OINTMENT TOPICAL at 08:07

## 2024-08-15 RX ADMIN — AMLODIPINE BESYLATE 10 MG: 10 TABLET ORAL at 08:03

## 2024-08-15 RX ADMIN — BENZOCAINE 6 MG-MENTHOL 10 MG LOZENGES 1 LOZENGE: at 19:48

## 2024-08-15 RX ADMIN — DEXTRAN 70, GLYCERIN, HYPROMELLOSE 1 DROP: 1; 2; 3 SOLUTION/ DROPS OPHTHALMIC at 15:33

## 2024-08-15 RX ADMIN — PANTOPRAZOLE SODIUM 40 MG: 40 INJECTION, POWDER, LYOPHILIZED, FOR SOLUTION INTRAVENOUS at 17:04

## 2024-08-15 RX ADMIN — BENZOCAINE 6 MG-MENTHOL 10 MG LOZENGES 1 LOZENGE: at 06:38

## 2024-08-15 RX ADMIN — CYANOCOBALAMIN TAB 1000 MCG 1000 MCG: 1000 TAB at 08:04

## 2024-08-15 RX ADMIN — SILDENAFIL 20 MG: 20 TABLET, FILM COATED ORAL at 08:03

## 2024-08-15 ASSESSMENT — COGNITIVE AND FUNCTIONAL STATUS - GENERAL
CLIMB 3 TO 5 STEPS WITH RAILING: 2 - A LOT
MOVING TO AND FROM BED TO CHAIR: 3 - A LITTLE
WALKING IN HOSPITAL ROOM: 3 - A LITTLE
STANDING UP FROM CHAIR USING ARMS: 3 - A LITTLE

## 2024-08-15 NOTE — PLAN OF CARE
Problem: Adult Inpatient Plan of Care  Goal: Patient-Specific Goal (Individualized)  Outcome: Progressing  Flowsheets (Taken 8/15/2024 1142)  Patient/Family-Specific Goals (Include Timeframe): pt will get OOB this shift  Individualized Care Needs: pain management, stict I&O, daily weight  Anxieties, Fears or Concerns: concerned about nausea   Plan of Care Review  Plan of Care Reviewed With: patient  Progress: improving  Outcome Evaluation: AAO x4, pt complains of N/V. improved after interventions, pt complains of pain at incision site on L groin managed with pain medication, CHF order in place. SR on monitor and vss.

## 2024-08-15 NOTE — HOSPITAL COURSE
"Arielle is a 63 y.o. female admitted on 8/8/2024 with Lung nodule [R91.1]  Thoracic lymphadenopathy [R59.0]  Pulmonary edema, acute (CMS/HCC) [J81.0]. Principal problem is Pulmonary edema, acute (CMS/HCC).    Past Medical History  Arielle has a past medical history of At risk for falls, De Quervain's tenosynovitis, Essential (primary) hypertension, Follicular carcinoma of thyroid (CMS/HCC), Gastro-esophageal reflux, Hand ulceration (CMS/HCC), Hyperlipidemia, unspecified, Interstitial lung disease (CMS/HCC), Leg ulcer (CMS/HCC), Lung nodule, Lupus (CMS/HCC), O2 dependent, Osteomyelitis of hand (CMS/HCC), Osteoporosis, Pulmonary hypertension (CMS/HCC), PVD (peripheral vascular disease) (CMS/HCC), Raynaud's disease, Reflux esophagitis, Scleroderma (CMS/HCC), Screening mammogram for breast cancer (05/04/2021), Vertigo, and Wound, open, foot.    History of Present Illness   Ms Felix is a 63 y.o. 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 (+) intractable bony chest    Admitted following lung biopsy in the setting of pulmonary edema.         Additionally, post-procedure she was found to have intractably bony chest pain which has not been responsive to tylenol.      8/9 podiatry consulted for  necrosis of the left second toe along with other chronic BLE wounds. WBAT to BLE      Late 8/10 noted right eye pain,, swelling, blurry vision, ophthalmology consulted; CTH neg for acute intracranial abnormalities  -improving with lubricant drops, r eye also improving.        8/10 BLE arterial US: indings concerning for hemodynamically significant stenosis within bilat popliteal arteries and distal left femoral artery\"     8/11 Ophthalmology was consulted for concerns of right eye swelling and blurry vision.     8/14:  S/p LLE " angio, balloon angio of distal SFA, TP trunk, DCB angio of popliteal artery on 8/14.   Per general surgery note on 8/15 OOB/light walking as tolerated     (8/15) CT Abd Hematoma in the right inguinal region     8/22: L ankle XR: (-) fracture/dislocation    8/22 L foot XR: There is diffuse osteopenia. There is no definite fracture or dislocation. Joint space narrowing is seen at the first metatarsophalangeal joint. There is extension of the digits relative to the phalanx is at the second through fourth metatarsophalangeal joints.    8/22: Abdom xray: Extensive stool retention in the colon.

## 2024-08-15 NOTE — PROGRESS NOTES
"Palliative Care Progress Note    This is Hospital Day: 8    Conversation/Goals of Care:  Goals remain life-prolonging and restorative, re-affirmed Full Code. Patient stated that she would not want to be \"dependent on machines, if there was not hope in recovery\".     Anxious appearance  Assessment & Plan  -anxiety likely related to fear of medication allergies    Patient tearful expressing feeling overwhelmed with the frequency of hospitalizations and complexity of medical conditions.     Plan  - Trial lorazepam 0.25 mg PO x 1, monitor for response  -possible Psych evaluation given medical complexity    Other constipation  Assessment & Plan  -LBM : unknown  -Constipation likely multifactorial r/t current hospitalization, heart failure on diuretic therapy, debility related to multiple medical comorbid conditions  -CT abdomen 8/9 with moderate colonic stool burden       Plan:  - 1 packet Miralax BID as needed  - Senokot x2 tabs PO, BID, Scheduled - hold for diarrhea  - Bisacodyl 10mg NH, qDay, PRN - if no BM in >2 days  - Monitor bowel patterns and adjust bowel regimen PRN.      Pain  Assessment & Plan  - Chronic pain attributed to: chronic chest pain, scleroderma c/b pulmonary HTN/ILD, Raynaud's Disease  - New right lower abdominal pain worse after s/p LLE angio on 8/14.     - Patient with numerous allergies to various pain medications (opioids/NSAIDS)  - PDMP queried: no recent scripts  - Home regimen: lidocaine patch  - Medications available inpatient: Acetaminophen 975 mg p.o. every 6 hours, lidocaine patches, cyclobenzaprine 5 mg p.o. 3 times daily as needed, heating pad  - Tramadol d/c'd 2/2 dizziness  - lyrica d/c'd 2/2 blurred vision      Plan:  - Per Pain Management rec's  - Would establish care with a pain management provider        Palliative care by specialist  Assessment & Plan  63 y.o. female with a PMH significant for severe scleroderma complicated by pulmonary hypertension, ILD.  [place] [facility] [place]8 for planned robotic navigational and EBUS bronchoscopies to evaluate growing LLL nodule c/b chest pain and volume overload.     - Code status: Full Code  - Prognosis:  High risk for acute deterioration and decline  - Capacity for Medical Decision Making: intact  - Advance Directives: No  - Goals of care: Goals are Life-prolonging/disease directed.  Refer to ACP note dated 8/10.  - Surrogate Decision Maker: Patient identifies her 2 adult children, Tiffanie and Wagner, as joint surrogate decision makers        Debility  Assessment & Plan  - Debility likely multifactorial in the setting of multiple chronic med conditions including scleroderma and CHF with current hospitalization  - Living situation prior to hospitalization: Lives at home with 24/7 aides  - Baseline functional status is: Ambulatory short distances  - Current Palliative Performance Status: 30%  - Baseline Palliative Performance Status:  50-60%    - Frailty   - Patient remains high risk for further deterioration and functional decline.    Plan  - Patient open to both palliative bridge and home based palliative NP visits            Interval History (Subjective)    Source: chart review, the patient, and bedside nurse    Pt seen in f/u for symptom management and assistance w/ complex medical decision making.    On exam, Pt is awake and resting in bed with support of low flow oxygen 2L NC . Pt AAOx3. She endorsed that she has been having progressive RLQ abdominal pain that has been radiating to her right lower back. She states that her pain is constant and and rates it as 12/10. Her pain is mostly generalized, but RLQ abdominal pain is new. Due to her numerous allergies she is reluctant to start any new medications, but she does state that flexeril has helped her pain a little, and does not make her nauseous.  She endorsed occasional shortness of breath and two episodes of vomiting this morning. She denies any nausea/vomiting at this time. She denies any  constipation, diarrhea, or difficulty with urination.     Current Medications:    acetaminophen, 975 mg, oral, q6h SPENCER    amLODIPine, 10 mg, oral, q AM    artificial tears, 1 drop, Both Eyes, QID    atorvastatin, 40 mg, oral, Daily (6p)    benzocaine-menthol, 1 lozenge, Mouth/Throat, q2h PRN    benzonatate, 100 mg, oral, 3x daily PRN    carboxymethylcellulose, 1 drop, Right Eye, 3x daily PRN    clopidogreL, 75 mg, oral, Daily    cyanocobalamin, 1,000 mcg, oral, Daily    cyclobenzaprine, 5 mg, oral, 3x daily PRN    glucose, 16-32 g of dextrose, oral, PRN **OR** dextrose, 15-30 g of dextrose, oral, PRN **OR** glucagon, 1 mg, intramuscular, PRN **OR** dextrose 50 % in water (D50), 25 mL, intravenous, PRN    docusate sodium, 100 mg, oral, Daily PRN    ethacrynic acid, 50 mg, oral, Daily    honey, , Topical, Daily PRN    levothyroxine, 100 mcg, oral, Daily (6:30a)    lidocaine, 1 patch, Topical, Daily    lidocaine, 1 patch, Topical, Daily    lidocaine, 1 patch, Topical, Daily    macitentan, 10 mg, oral, Daily    melatonin, 3 mg, oral, Nightly PRN    ondansetron ODT, 4 mg, oral, q8h PRN **OR** ondansetron, 4 mg, intravenous, q8h PRN    pantoprazole, 20 mg, oral, Daily    artificial tears, 1 drop, Both Eyes, q4h while awake    phenol, 1 spray, Mouth/Throat, q2h PRN    polyethylene glycol, 17 g, oral, BID    polyethylene glycol, 17 g, oral, 2x daily PRN    senna, 2 tablet, oral, BID    sildenafiL (pulm.hypertension), 20 mg, oral, TID    sodium chloride, 2 spray, Each Nostril, Daily    white petrolatum, , Topical, q4h PRN    white petrolatum, , Topical, BID    white petrolatum-mineral oil, , Both Eyes, Nightly    ethacrynic acid    Objective    Physical Exam:  General:   No Acute Distress  Eyes:  Sclera Anicteric  ENMT:  Mucus Membranes Moist  CV:  Radial Pulses 3  bilateral  Lungs:  No evidence of increased WOB  Abdomen:  Bowel Sounds present  Psych:  Appropriate and Cooperative  Neuro:  Awake, Alert and Oriented  person,  place, time/date and situation  Skin:  No Rash, toes on left foot with dressing    Vitals:  Vitals:    08/15/24 1107   BP: (!) 115/55   Pulse: 74   Resp:    Temp: 36.8 °C (98.2 °F)   SpO2: 100%       Laboratory Studies:    CBC Results         08/15/24 08/13/24 08/09/24     0901 0958 1938    WBC 12.22 5.64 9.38    RBC 3.61 4.22 4.49    HGB 8.7 10.3 10.7    HCT 28.7 34.3 36.7    MCV 79.5 81.3 81.7    MCH 24.1 24.4 23.8    MCHC 30.3 30.0 29.2     199 285         CMP Results         08/15/24 08/13/24 08/09/24     0901 0958 1938     141 140    K 4.3 3.8 3.6    Cl 103 106 106    CO2 26 29 24    Glucose 99 113 97    BUN 47 23 18    Creatinine 1.4 1.0 1.3    Calcium 9.0 9.4 9.5    Anion Gap 10 6 10    EGFR 42.4 >60.0 46.3       Troponin I Results         08/09/24 08/09/24 07/25/24     1232 1048 1732    HS Troponin I 93.8 92.5 66.8       ABG Results         04/13/23     1949    Source Of Oxygen nc         I have reviewed the patient's pertinent labs to the time of note. Pertinent labs are within normal limits. Significant for leukocytosis, anemia and elevated creatine    Imaging and Other Studies:     Radiology Imaging    XR CHEST 1 VW    Narrative  CLINICAL HISTORY: volume   .    COMMENT: AP chest radiograph was obtained. Comparison is made with recent  studies.    There is ongoing diffuse vascular/interstitial prominence with cardiomegaly.  Pleural effusions are not excluded without gross change from prior. There is no  definite pneumothorax. Osseous degenerative changes are noted.    Impression  IMPRESSION: Ongoing interstitial thickening suggestive of pulmonary edema,  similar to prior.                                                                      Cardiac Imaging    TRANSTHORACIC ECHO (TTE) LIMITED 07/19/2024    Interpretation Summary  Rhythm is sinus tachycardia.    Mild increased wall thickness with normal left ventricular cavity and preserved systolic function. EF 65%. No wall motion  abnormalities.  Normal right ventricular size with preserved systolic function.  IVC normal in size with >50% respiratory variation consistent with normal right sided filling pressures.  Small pericardial effusion. No evidence of hemodynamic compromise with normal respiratory variation and no chamber collapse. Prominent epicardial fat.  Compared with previous study of 5/23/2024, small pericardial effusion persists.       I have independently reviewed the pertinent imaging to the time of note and agree with reported results.    I have independently reviewed the pertinent cardiac studies to the time of note and agree with reported results.  Lab Results   Component Value Date    QTCCALCULAT 473 08/13/2024       Discussed with: Medical Team, RN     KARYNA Pop  [facility]  [phone] [facility] [place] office  [phone] [facility] [place] Fax  [address] [place] [address]  Pager [phone]

## 2024-08-15 NOTE — PROGRESS NOTES
Hospital Medicine     Daily Progress Note       SUBJECTIVE   Interval History: Patient was awoken from sleep.  Immediately upon awakening, she reports pain.  She is having lower abdominal pain and nausea/vomiting.  She had 2 episodes of vomiting so far today.  She did take her medications on an empty stomach, I did encourage her to take her meds after coating her stomach with some food.  She tells me she is moving her bowels without issue.  Still no improvement whatsoever in her bony chest pain and shortness of breath.     OBJECTIVE      Vital signs in last 24 hours:  Temp:  [36.4 °C (97.6 °F)-37.2 °C (99 °F)] 36.8 °C (98.2 °F)  Heart Rate:  [66-92] 74  Resp:  [14-20] 16  BP: (114-136)/(55-74) 115/55    Intake/Output Summary (Last 24 hours) at 8/15/2024 1404  Last data filed at 8/15/2024 1315  Gross per 24 hour   Intake 480 ml   Output 650 ml   Net -170 ml       PHYSICAL EXAMINATION      Physical Exam  Vitals and nursing note reviewed.   Constitutional:       General: She is not in acute distress.     Comments: Chronically ill-appearing, underweight   HENT:      Head: Normocephalic.   Eyes:      Conjunctiva/sclera: Conjunctivae normal.   Cardiovascular:      Rate and Rhythm: Normal rate and regular rhythm.   Pulmonary:      Effort: Pulmonary effort is normal. No respiratory distress.      Breath sounds: Normal breath sounds.   Abdominal:      General: Bowel sounds are normal. There is no distension.      Palpations: Abdomen is soft.      Tenderness: There is no abdominal tenderness.   Musculoskeletal: Hand deformities-chronic     Cervical back: Neck supple.      Right lower leg: No edema.      Left lower leg: No edema.   Skin:     General: Skin is warm and dry.   Neurological:      Mental Status: She is alert and oriented to person, place, and time.   Psychiatric:         Behavior: Behavior normal.           LINES, CATHETERS, DRAINS, AIRWAYS, AND WOUNDS   Lines, Drains, and Airways:  Wounds (agree with  documentation and present on admission):  Peripheral IV (Adult) 08/08/24 Anterior;Left;Proximal Forearm (Active)   Number of days: 4       Wound Anterior;Right Toe (2nd) (Active)   Number of days: 4       Wound Anterior;Left Toe (Great) (Active)   Number of days: 4       Wound Anterior;Left Toe (2nd) (Active)   Number of days: 4         Comments:    LABS / IMAGING / TELE      Labs  Results from last 7 days   Lab Units 08/15/24  0901   SODIUM mEQ/L 139   POTASSIUM mEQ/L 4.3   CHLORIDE mEQ/L 103   CO2 mEQ/L 26   BUN mg/dL 47*   CREATININE mg/dL 1.4*   GLUCOSE mg/dL 99   CALCIUM mg/dL 9.0     Results from last 7 days   Lab Units 08/15/24  0901   WBC K/uL 12.22*   HEMOGLOBIN g/dL 8.7*   HEMATOCRIT % 28.7*   PLATELETS K/uL 205           Imaging  No new images today      ECG/Telemetry  Reviewed    ASSESSMENT AND PLAN      Chest pain  Assessment & Plan  Chest pain has been ongoing for months, chronic at this point  Exam and history suggest MSK  Pain control is difficult as patient has various allergies, including morphine, oxycodone, NSAIDs.  She has a lidocaine patch, she does not think it is helping much  Consulted pain management; Advised addition of Lyrica, Flexeril, tramadol  Currently, ophthalmology recommended cessation of Lyrica given concerns of it causing her blurry vision/dry eyes  Patient is very apprehensive to try any new medications, she could not tolerate tramadol, she is doing well with Flexeril though however    Interstitial lung disease (CMS/HCC)  Assessment & Plan  DPLD  Does not appear to be in exacerbation for this  Has not tolerated trials of Mycophenolate. On Tocilizumab.   On 2L O2-NC chronically    Wound of lower extremity  Assessment & Plan  bilateral lower extremity wounds and bilateral second digit dry gangrene  Podiatry consult appreciated  Advised PVR, arterial US; Ultrasound ENRRIQUE with various deficiencies, but seems worse in the left PT at 0.62.  Arterial ultrasound with hemodynamically  significant stenosis in the bilateral popliteal arteries and distal fem  Vascular surgery consult appreciated  s/p LLE angio, balloon angio of distal SFA, TP trunk, DCB angio of popliteal artery on 8/14  Now on Plavix 75mg daily. She has an allergy to aspirin.  Local wound care as per pods    Abdominal pain  Assessment & Plan  Pt reporting lower quadrant abdominal pian this AM along with 2 episodes of vomiting  Unclear etiology, possibly referred pain from her R groin site. Her R groin site looks good with no hematoma.  She is moving her bowels  Unclear etiology at this time  Given her newly elevated WBC count today, will check CTAP  Check UA, hepatic function panel, Lipase  Pt declines starting any new medications for pain    Pain of both eyes  Assessment & Plan  Late 8/10 noted right eye pain,, swelling, blurry vision  Seen by ophthalmology  No concern for preseptal or orbital cellulitis  CT orbits: No evidence of acute process involving the orbits  Carotid US negative for HD significant stenosis  Advised various drops, ointments for her eyes  Advised stopping Lyrica  Both blurred vision and eye pain have resolved    Pulmonary nodule  Assessment & Plan  Left lower lobe nodule, s/p ION navigational bronchoscopy with biopsy showing a small focus of atypical glands.     -Pulm input appreciated  -outpatient follow up with pulmonary regarding biopsy findings    Acute on chronic heart failure with preserved ejection fraction (CMS/HCC)  Assessment & Plan  EF 65%  Known to Cardiology Dr. Arvizu, in-house consult placed  S/p IV ethacrynic acid, now back on PO  Continue pHTN meds; opsumit, revatio  CHF order set, daily weights, salt/fluid restriction, strict Is&Os      Scleroderma (CMS/HCC)  Assessment & Plan  Again, chronic  Tocilizumab every 28 days    Pulmonary hypertension (CMS/HCC)  Assessment & Plan  Chronic, extensive history    Continue home med macitentan  On sildenafil  Also offloading with ethacrynic  acid    Essential (primary) hypertension  Assessment & Plan  Pressure stable here, continue home medications         VTE Assessment: Padua    VTE Prophylaxis:  Current anticoagulants:    None      Code Status: Full Code      Estimated Discharge Date: 8/16/2024       Disposition Planning: pending clinical course     KARYNA Bragg  8/15/2024

## 2024-08-15 NOTE — OP NOTE
"Peripheral Endovascular Procedure Note    Procedure:    ANGIOGRAM/ANGIOPLASTY FEMORAL , POSS. STENT/COIL/FEM-POP  CPT(R) Code:  86937 - CHG ANGIO EXTERMITY BILAT  Procedures: Ultrasound-guided right femoral retrograde access; aortogram iliac angiography; bilateral lower extremity angiography; access left peroneal artery from right common femoral artery; plain and drug-coated balloon angioplasty left tibioperoneal trunk, left popliteal, left superficial femoral artery.    Pre-op Diagnosis     * Scleroderma (CMS/HCC) [M34.9]     * Multiple opens wound of lower extremity, unspecified laterality, sequela [S81.809S]     * Peripheral arterial disease (CMS/HCC) [I73.9]       Post-op Diagnosis     * Scleroderma (CMS/HCC) [M34.9]     * Multiple opens wound of lower extremity, unspecified laterality, sequela [S81.809S]     * Peripheral arterial disease (CMS/HCC) [I73.9]    Surgeon(s) and Role:     * Rashel Richards MD FACS - Primary    Anesthesia: General    Staff:   Circulator: Regla Narayanan RN; Debo Tracy RN  Scrub Person: Gwendolyn Sierra RN; Hakan Ortega RN     Summary of Findings:   1) Vessels treated: Superficial femoral artery, Popliteal artery, and Tibioperoneal trunk  2) Type of treatment and devices used: Plain and drug-coated balloon    Contrast volume: 45  Total treated length (sum of lengths of each treated segment): 14  Total occlusion length (sum of lengths of each treated completely occluded segment; enter \"zero\" if no occlusion present) : 0  Chronic total occlusion: no  If popliteal treated, which segment: P2  TASC Grade: B  Calcification: Mild  Runoff via: 2 via anterior tibial artery and peroneal artery     Indications for Procedure   Gangrene distal left second toe    Procedure Details   The patient was identified in the PACU.  She is a very ill 63-year-old female with scleroderma.  She had bilateral gangrene distal left second toe with distal ulceration on the right foot as well.  Noninvasive " studies disclosed potential left common femoral disease and superficial femoral-popliteal disease bilaterally.  Recommendation is to proceed with angiography with intervention.  The procedure was explained to the patient with risks and benefits discussed.  She agreed to proceed under local anesthesia with intravenous sedation.    In the operating room the right inguinal region was prepped with ChloraPrep and draped in the field.  After instillation of 1% Xylocaine retrograde common femoral puncture was performed on the right side.  This was ultrasound-guided.  Angiography showed excellent position of the wire and sheath.  5 Welsh sheath was inserted.  Angiography was performed of the right lower extremity showing moderate popliteal disease and mild superficial femoral artery stenosis.  At this point a Omni Flush catheter was inserted and positioned in the infrarenal aorta.  Iliac angiography was performed showing no significant aortic or iliac disease.  The contralateral iliac system was accessed and a guidewire advantage was inserted and positioned in the left superficial femoral artery.  Omni Flush catheter was passed down to the external iliac artery and angiography was performed showing no significant calcific disease.  Left lower extremity angiography was performed showing moderate stenosis in the distal superficial femoral artery and critical stenosis in the P2 segment of the popliteal artery and a high-grade stenosis throughout the entire tibioperoneal trunk.  Wire and catheter were passed into the peroneal artery and the 035 wire was then exchanged to a V18 wire.  Aman 2.5 mm balloon was inserted across tibioperoneal trunk and inflated to 10 mg for 45 seconds.  Results showed widely patent tibioperoneal trunk.  A 5 mm x 40 mm balloon was then inflated to nominal pressure across the popliteal segment and the superficial femoral artery segment.  A 5 mm x 40 mm drug-coated balloon was then inflated across  the popliteal segment for 3 minutes.  This balloon was also used across the superficial femoral artery segment for approximately 1 minute.  Completion angiography showed excellent flow through the treated segments.  There was a 2.5 vessel runoff.  Sheath and wires were removed and the access site was closed with an Angio-Seal.  She tolerated the procedure well and was taken the PACU in good condition.    Estimated Blood Loss: No blood loss documented.    Specimens:                No specimens collected during this procedure.      Drains: * No LDAs found *    Implants: * No implants in log *           Complications:  None; patient tolerated the procedure well.           Disposition: PACU - hemodynamically stable.           Condition: stable    Rashel Richards MD FACS  Phone Number: [phone]

## 2024-08-15 NOTE — PLAN OF CARE
Care Coordination Discharge Plan Note     Discharge Needs Assessment  Concerns to be Addressed: care coordination/care conferences, discharge planning  Current Discharge Risk: chronically ill    Anticipated Discharge Plan  Anticipated Discharge Disposition: home with assistance, home with home health  Type of Home Care Services: nursing, home PT, home OT      Patient Choice  Offered/Gave Vendor List: yes  Patient's Choice of Community Agency(s): [facility]    Patient and/or patient guardian/advocate was made aware of their right to choose a provider. A list of eligible providers was presented and reviewed with the patient and/or patient guardian/advocate in written and/or verbal form. The list delineates providers in the patient’s desired geographic area who are participating in the Medicare program and/or providers contracted with the patient’s primary insurance. The Medicare list and quality ratings were obtained from the Medicare.gov [medicare.gov] website.      Interdisciplinary Discharge Plan Review:  Participants:physician, patient, , nursing, physical therapy, social work/services    Concerns Comments: Per updates from DCP rounds, pt ENRIQUE is tomorrow/Friday pending progress. IV ABX cont. C/o N/V. Declines PT eval today. BLE balloon angiogram done. [facility] following. Fax AVS at DC to [phone]. Home O2 set up via [facility]/2024. Has a port tank at bedside. Family to provide transport home. [facility] will continue to offer support and follow for needs until DC complete.    [facility] updated in [facility] on ENRIQUE.    Discharge Plan:   Disposition/Destination: Home Health Care - Other / Home  Discharge Facility:  n/a  Community Resources:  n/a    Discharge Transportation:  Is Out of Hospital DNR needed at Discharge: no  Does patient need discharge transport? No

## 2024-08-15 NOTE — PROGRESS NOTES
Cardiology/pulmonary hypertension service progress note    Interval History:    Awake in bed, reports she has been nauseous with two episodes of vomiting since her procedure yesterday.  Continues with Dyspnea on exertion, however she is wanting to ambulate as much as possible. Standing weight yesterday was 111 lbs, she states at home she is normally around 109 lbs. Still has pain in the foot, does not notice a difference since the intervention.    Background per Dr. Arvizu:    Ms. Felix is a 63 y.o. female with a past medical history of Pulmonary HTN, HTN, Raynaud's Disease, Cutaneous Systemic Scleroderma (dx 1998), ILD, PE (3/2023). She is a patient of Dr. Nguyễn. She was previously followed by Dr. Jos Valdez at [facility] [place]. Echocardiogram from 4/21/2022 showed LVEF: 55-60%, mild aortic valve thickening, pulmonary artery systolic pressure: 52 mmHg and trivial pericardial effusion. LHC and RHC (separate occasions) over the last 5-10 years which showed normal hemodynamics and normal coronaries. She had a RHC 9/02/2020 showing top-normal right sided filling pressures with mild pulmonary hypertension, top-normal PVR and normal pulmonary capillary wedge pressure. The cardiac index was normal, seen below.     On 6/27/2022, she was in [facility] for chest pain. EKG: NSR without ischemic changes. hsTrop: 12->16, d-dimer: 0.56, CXR showed cardiomegaly and diffuse interstitial prominence.     Dr. Nguyễn ordered echocardiogram, which was completed on 8/22/2022 and showed estimated RVSP = 50-55 mmHg, normal-sized LV with normal LV systolic function. Estimated EF 55- 60%. Wall motion grossly normal, mild concentric left ventricular hypertrophy, grade I LV diastolic dysfunction, probably normal RV size and function.    At her initial visit on 10/11/2022, she reported that she felt very short of breath with minimal activity most of the time. She reports that this started about 1 year prior and had progressively gotten worse. She  described PND and bendopnea. She reported that she experienced ankle swelling, worsened over the past year and usually worse towards the end of the night. She also reported some swelling in her abdomen. She reported daily palpations. I recommended a RHC which was done 11/28/2022 and showed: Normal right sided filling pressures. Mildly elevated left sided filling pressures. Precapillary pulmonary hypertension with mean PA 36 mmHg.    She was hospitalized on 3/19/2023 at [facility] for PE diagnosed on V/Q scan. She reports that she had presented with left arm pain and heaviness. She states that she was started on Apixaban. One week after her last office visit (4/6/2023), she presented to [facility] with chest pain, epistaxis and hemoptysis. Echocardiogram showed: left ventricular cavity size normal with mild left ventricular hypertrophy and preserved systolic function. Estimated EF 65%. Chest CTA showed no evidence of PE; Apixaban was discontinued.     She had a repeat echocardiogram (6/2023) which showed: Normal left ventricular cavity size and mild concentric left ventricular hypertrophy. Normal systolic function. EF: 60%. Normal right ventricular structure and function. Mild tricuspid valve regurgitation with estimated RVSP: 55 mmHg. Compared to previous study from 4/14/2023, estimated right ventricular systolic pressure were higher.    She underwent V/Q scan in September 2023, which showed intermediate-to-high probability of pulmonary embolic disease, as a result she completed CTA Chest PE which showed: no evidence for filling defect or vessel cutoff to suggest pulmonary embolism. Enlargement of the pulmonary arteries compatible with pulmonary arterial hypertension was seen.    She reports she started Macitentan around August or September 2023. She stated that she had not been checking her SpO2. She reports she did not notice any difference since starting it. She reported she had been feeling more  shortness of breath at times with ADLs.     She presented to [facility] on 3/3/2024 with chest pressure, palpitations and nausea. She was noted to have hypoxia and tachypnea at presentation. She was found to have moderate-to-large pericardial effusion without tamponade. She was started on Colchicine 0.6 mg BID on 3/4/2024 and when she did not improve symptomatically, Prednisone 20 mg daily was added on 3/5/2024. On 3/7/2024, she developed multiple episodes of diarrhea and Colchicine dose was decreased to 0.3 mg. Coronary CTA, done as part of the ischemic workup, showed moderate 2 vessel CAD. She was recommended to begin Atorvastatin 20 mg daily and Clopidogrel 75 mg daily. She initially refused those two medications, but eventually agreed to take Atorvastatin.      She was discharged on Colchicine 0.3 mg for at least 3 months and Prednisone 20 mg for a total two week course until 3/19/2024, then decrease the dose to 10 mg daily for 2 weeks until 4/2/2024, then decrease dose to 5 mg daily for 2 weeks until 4/16/2024. She reports she tapered off Prednisone as instructed and has continued to take Colchicine as prescribed. She completed an echocardiogram (4/10/2024) which showed: Small-to-moderate sized, circumferential pericardial effusion. No echocardiographic evidence for cardiac tamponade. Compared to previous study from 3/4/2024, no significant change. Pericardial effusion size was similar, estimated right atrial pressure lower.      She presented to [facility] (4/16/2024) with increased pleuritic chest pain. On presentation to the ER, she was noted to be hypertensive and tachycardic. Labs showed mildly elevated troponin, elevated BNP and leukocytosis. CT angiography of the chest did not show evidence of PE. It was read as large pericardial effusion, evidence of emphysema, evidence of pulmonary hypertension, 16 mm left lobe lung mass.       She presented again to [facility] on 5/21/2024 at the request of her Rheumatologist. During her  "visit there, she was noted to be tachycardic and with some shortness of breath. Her chest pain was improved from her prior admission. On arrival, O2 saturations were normal. ECG showed: Sinus Tachycardia (HR: 125 bpm). BNP >300, hsTrop 32 ->24. POCUS in the emergency department showed evidence of pericardial effusion with no tamponade physiology. CTA Chest did not show evidence of pulmonary embolism. There were bilateral changes radiographically concerning for volume overload. She received Methylpredinsolone 125 mg in ED. Of note, she was on Prednisone 10 mg daily at home. She was given IV diuresis. She was discharged on Furosemide 20 mg PO PRN daily for fluid weight gain / swelling.     She had brief hospitalization and was discharged on 7/23 for chest pain. She presented to the ED on 7/25/2024 for worsening of her chronic chest pain and 8/5/2024 for facial swelling and chronic chest pain.      She underwent bronchoscopic lung biopsy yesterday, 8/8/2024, with Dr. Niles Rodriguez. Pathology showed atypical glandular cells which may be consistent with adenocarcinoma in situ.     Post-procedure CXR showed increase pulmonary vascular congestion. She was admitted for optimization.       ---    Rheumatology: Dr. Trevizo  Pulmonology: Dr. Wasserman    Past Medical / Surgical History:  Pulmonary HTN, HTN, Raynaud's Disease, Cutaneous Systemic Scleroderma (dx 1998), ILD, PE (3/2023), Follicular Carcinoma of Thyroid, Tenosynovitis, de Quervain, h/o Hernia Repair, h/o Appendicitis, h/o Digit Amputation, H/o Total Thyroidectomy (Dr. Pacheco at [facility]; 4/2013)    Family & Social History: She is , she has two children. She has worked as an .     Tobacco: former 2005  EtOH: never   Illicits: never     Her brother has CAD with stent  1st cousin with SLE  Both parents had \"heart problems\"    Allergies:   Allergies   Allergen Reactions    Aspirin Shortness of breath     Other reaction(s): Unknown  \"stop breathing\"      " Ibuprofen Shortness of breath     Stop breathing    Iodine Shortness of breath     Other reaction(s): Unknown    Iodine And Iodide Containing Products Shortness of breath     ? rash    Morphine Shortness of breath      Reported wamrth of face, improved with benadryl and cold compresses after getting morphine as well as episode of (subjective ) dyspnea, and thought she passed out - did have wamrth and erythema of face with mild edema R>L cheek notably on exam.     Penicillins Shortness of breath     Other reaction(s): Unknown    Sulfa (Sulfonamide Antibiotics) Angioedema    Clindamycin     Hydrochlorothiazide      Other reaction(s): Abdominal Pain   Slow heart rate    Oxycodone      Other reaction(s): Vomiting    Sulfamethoxazole-Trimethoprim      Other reaction(s): Vomiting, Weakness     Latex Rash       Medications:   Current Facility-Administered Medications   Medication Dose Route Frequency Provider Last Rate Last Admin    acetaminophen (TYLENOL) tablet 975 mg  975 mg oral q6h [facility] Simon Cano, DO   975 mg at 08/15/24 0440    amLODIPine (NORVASC) tablet 10 mg  10 mg oral q AM Simon Cano DO   10 mg at 08/15/24 0803    artificial tears (dextran-hpm-glyc) (GENTEAL TEARS) 0.1-0.3-0.2 % eye drops 1 drop  1 drop Both Eyes QID Simon Cano DO   1 drop at 08/15/24 0807    atorvastatin (LIPITOR) tablet 40 mg  40 mg oral Daily (6p) Simon Cano DO   40 mg at 08/14/24 1654    benzocaine-menthol (CEPACOL/CHLORASEPTIC) lozenge 1 lozenge  1 lozenge Mouth/Throat q2h PRN Simon Cano DO   1 lozenge at 08/15/24 0638    benzonatate (TESSALON) capsule 100 mg  100 mg oral 3x daily PRN Simon Cano DO   100 mg at 08/15/24 0804    carboxymethylcellulose (REFRESH PLUS) 0.5 % ophthalmic dropperette 1 drop  1 drop Right Eye 3x daily PRN Simon Cano DO        clopidogreL (PLAVIX) tablet 75 mg  75 mg oral Daily Simon Cano  Hoa, DO   75 mg at 08/15/24 0803    cyanocobalamin (VITAMIN B12) tablet 1,000 mcg  1,000 mcg oral Daily Simon Canorie, DO   1,000 mcg at 08/15/24 0804    cyclobenzaprine (FLEXERIL) tablet 5 mg  5 mg oral 3x daily PRN Simon Canorie, DO   5 mg at 08/15/24 0440    glucose chewable tablet 16-32 g of dextrose  16-32 g of dextrose oral PRN Simon Cano, DO        Or    dextrose 40 % oral gel 15-30 g of dextrose  15-30 g of dextrose oral PRN Simon Cano, DO        Or    glucagon (GLUCAGEN) injection 1 mg  1 mg intramuscular PRN Simon Cano, DO        Or    dextrose 50 % in water (D50) injection 12.5 g  25 mL intravenous PRN Simon Cano, DO        docusate sodium (COLACE) capsule 100 mg  100 mg oral Daily PRN Simon Canorie, DO        ethacrynic acid (EDECRIN) tablet 50 mg  50 mg oral Daily Simon Canorie, DO   50 mg at 08/15/24 0805    honey (MEDIHONEY) 100 % topical paste   Topical Daily PRN Simon Canorie, DO        levothyroxine (SYNTHROID) tablet 100 mcg  100 mcg oral Daily (6:30a) Simon Cano, DO   100 mcg at 08/15/24 0633    lidocaine (ASPERCREME) 4 % topical patch 1 patch  1 patch Topical Daily Simon Canorie, DO   1 patch at 08/15/24 0806    lidocaine (ASPERCREME) 4 % topical patch 1 patch  1 patch Topical Daily Simon Canorie, DO   1 patch at 08/14/24 0835    lidocaine (ASPERCREME) 4 % topical patch 1 patch  1 patch Topical Daily Simon Canorie, DO   1 patch at 08/15/24 0805    macitentan (OPSUMIT) tablet 10 mg (Patient Owned)  10 mg oral Daily Simon Cano, DO   10 mg at 08/15/24 0805    melatonin ODT 3 mg  3 mg oral Nightly PRN Simon Cano DO   3 mg at 08/13/24 2303    ondansetron ODT (ZOFRAN-ODT) disintegrating tablet 4 mg  4 mg oral q8h PRN Simon Cano DO        Or    ondansetron (ZOFRAN)  injection 4 mg  4 mg intravenous q8h PRN Simon Cano, DO   4 mg at 08/15/24 0333    pantoprazole (PROTONIX) tablet,delayed release (DR/EC) 20 mg  20 mg oral Daily Simon Cano, DO   20 mg at 08/15/24 0804    peg 400-hypromellose-glycerin (ARTIFICIAL TEARS) 1-0.2-0.2 % eye drops 1 drop  1 drop Both Eyes q4h while awake Simon Cano, DO   1 drop at 08/15/24 0806    phenoL (SORE THROAT SPRAY) 1.4 % mucosal spray 1 spray  1 spray Mouth/Throat q2h PRN Simon Cano DO        polyethylene glycol (MIRALAX) 17 gram packet 17 g  17 g oral BID Simon Cano DO   17 g at 08/11/24 2126    polyethylene glycol (MIRALAX) 17 gram packet 17 g  17 g oral 2x daily PRN Simon Cano DO        senna (SENOKOT) tablet 2 tablet  2 tablet oral BID Simon Cano, DO   2 tablet at 08/11/24 2127    sildenafiL (pulm.hypertension) (REVATIO) tablet 20 mg  20 mg oral TID Simon Cano, DO   20 mg at 08/15/24 0803    sodium chloride (OCEAN) 0.65 % nasal spray 2 spray  2 spray Each Nostril Daily Simon Cano DO   2 spray at 08/15/24 0807    white petrolatum (AQUAPHOR) ointment   Topical q4h PRN Simon Cano DO   Given at 08/13/24 2117    white petrolatum (AQUAPHOR) ointment   Topical BID Simon Cano, DO   Given at 08/15/24 0807    white petrolatum-mineral oil (ARTIFICIAL TEARS OINT) ophthalmic ointment   Both Eyes Nightly Simon Cano, DO   Given at 08/14/24 2224       Review of Systems:   All other systems reviewed and are negative except as per HPI.    Physical Exam:     Wt Readings from Last 10 Encounters:   08/14/24 50.3 kg (111 lb)   08/05/24 49.4 kg (109 lb)   08/05/24 49.4 kg (109 lb)   07/20/24 49.5 kg (109 lb 2 oz)   07/19/24 51.3 kg (113 lb)   06/20/24 51.7 kg (114 lb)   06/14/24 52.6 kg (116 lb)   05/23/24 52.4 kg (115 lb 9.6 oz)   04/17/24 53.5 kg (118 lb)   04/10/24  44.5 kg (98 lb)       BP Readings from Last 5 Encounters:   08/15/24 (!) 125/58   08/05/24 (!) 148/75   07/25/24 (!) 155/77   07/23/24 (!) 101/57   07/19/24 (!) 154/80       Vitals:    08/15/24 0724   BP: (!) 125/58   Pulse: 80   Resp:    Temp: 36.7 °C (98.1 °F)   SpO2: 98%       Constitutional: NAD, AAOx3  HENT: Normocephalic, atraumatic head, sclerae anicteric, no cervical lymphadenopathy, trachea midline, facial swelling  Cardiovascular: RRR, no murmurs, rubs or gallops, JVP: 10 cm H2O   cm H2O, no carotid bruits.  Respiratory: CTA bilateral lung fields, no wheezes, rales or rhonchi  GI: soft, non-tender/non-distended  Musculoskeletal / Extremities: Bilateral hip edema noted, +2 pulses bilateral radial, DP/PT arteries, skin tightening on face and fingertips, ulceration and discoloration of 2nd phalange b/l LE.   Skin: no rashes. Facial changes c/w scleroderma  Neuro / Psych: no focal deficits, CNII-XII grossly intact, appropriate, cooperative    Diagnostic Data:     Component      Latest Ref Rng 7/20/2024 7/25/2024   Sodium      136 - 145 mEQ/L 142  139    Potassium, Bld      3.5 - 5.1 mEQ/L 4.2  4.3    Chloride      98 - 107 mEQ/L 107  103    CO2      21 - 31 mEQ/L 25  27    BUN      7 - 25 mg/dL 17  17    Creatinine      0.6 - 1.2 mg/dL 1.2  1.1    Glucose      70 - 99 mg/dL 85  86    Calcium      8.6 - 10.3 mg/dL 9.3  10.6 (H)    AST (SGOT)      13 - 39 IU/L  15    ALT (SGPT)      7 - 52 IU/L  8    Alkaline Phosphatase      34 - 125 IU/L  82    Total Protein      6.0 - 8.2 g/dL  8.8 (H)    Albumin      3.5 - 5.7 g/dL  4.3    Bilirubin, Total      0.3 - 1.2 mg/dL  0.4    eGFR      >=60.0 mL/min/1.73m*2 51.0 (L)  56.6 (L)    Anion Gap      3 - 15 mEQ/L 10  9       Component      Latest Ref Rng 7/20/2024 7/25/2024   WBC      3.80 - 10.50 K/uL 7.27  10.29    Hemoglobin      11.8 - 15.7 g/dL 9.8 (L)  11.2 (L)    Hematocrit      35.0 - 45.0 % 32.0 (L)  37.3    MCV      83.0 - 98.0 fL 80.4 (L)  80.4 (L)     Platelets      150 - 369 K/uL 206  331       Component      Latest Ref Rng 7/19/2024 7/25/2024 8/9/2024   High Sens Troponin I      <15.0 pg/mL 89.2 (HH)  66.8 (HH)  92.5 (HH)    High Sens Troponin I       90.3 (HH)  83.4 (HH)         Component      Latest Ref Rn 5/22/2024 6/22/2024 7/19/2024 7/25/2024   BNP      <=100 pg/mL 352 (H)  150 (H)  151 (H)  220 (H)       Component      Latest Ref Rn 4/16/2024 5/23/2024 7/21/2024 7/22/2024   Sed Rate      0 - 30 mm/hr 101 (H)  79 (H)  105 (H)  >119 (H)       Component      Latest Ref [facility] 4/18/2024 5/24/2024   WBC      3.80 - 10.50 K/uL 8.40  16.22 (H)    RBC      3.93 - 5.22 M/uL 3.54 (L)  3.82 (L)    Hemoglobin      11.8 - 15.7 g/dL 9.8 (L)  10.3 (L)    Hematocrit      35.0 - 45.0 % 32.8 (L)  33.9 (L)    Platelets      150 - 369 K/uL 254  289       Component      Latest Ref [facility] 5/24/2024   Magnesium      1.8 - 2.5 mg/dL 2.1      Component      Latest Ref [facility] 4/13/2023 3/7/2024 5/22/2024   TSH      0.34 - 5.60 mIU/L 4.71  0.28 (L)  1.03      Component      Latest Ref [facility] 5/24/2024   Sodium      136 - 145 mEQ/L 138    Potassium, Bld      3.5 - 5.1 mEQ/L 4.4    Chloride      98 - 107 mEQ/L 104    CO2      21 - 31 mEQ/L 24    BUN      7 - 25 mg/dL 40 (H)    Creatinine      0.6 - 1.2 mg/dL 1.1    Glucose      70 - 99 mg/dL 87    Calcium      8.6 - 10.3 mg/dL 9.5    eGFR      >=60.0 mL/min/1.73m*2 56.6 (L)    Anion Gap      3 - 15 mEQ/L 10      Component      Latest Ref [facility] 4/13/2023   Hemoglobin A1C      <5.7 % 4.4        Component      Latest Ref Rng 4/14/2023   Triglycerides      30 - 149 mg/dL 44    Cholesterol      <=200 mg/dL 194    HDL      >=55 mg/dL 49 (L)    LDL Calculated      <=100 mg/dL 136 (H)    Non-HDL, Calculated      mg/dL 145    RISK      <=5.0  4.0       Component      Latest Ref Rng 4/13/2023   High Sens Troponin I      <15.0 pg/mL 23.0 (H)       Component      Latest Ref Rng 4/14/2023   Ferritin      11 - 250 ng/mL 75      Component      Latest Ref Rng  4/14/2023   Iron      35 - 150 ug/dL 29 (L)    TIBC      270 - 460 ug/dL 245 (L)    UIBC      180 - 360 ug/dL 216    Iron Saturation      15 - 45 % 12 (L)        Component      Latest Ref Rng 6/27/2022 4/13/2023   BNP      <=100 pg/mL 111 (H)  105 (H)                          ---    ECG (8/2/2024, personally reviewed):   Sinus tachycardia   Left ventricular hypertrophy with repolarization abnormality    HR: 117 bpm     ---        Lower extremity arterial duplex and ENRRIQUE August 10, 2024:  Right ankle ENRRIQUE (DP): 0.74.  Right ankle ENRRIQUE (PT): 0.88.  Left ankle ENRRIQUE (DP): 0.90.  Left ankle ENRRIQUE (PT): 0.62.    Findings concerning for hemodynamically significant stenosis within the  bilateral popliteal arteries and distal left femoral artery. There are also  findings which can be seen with inflow stenosis at the level left common femoral  artery. CTA of the abdomen and pelvis with lower extremity runoff could be  performed for further evaluation if clinically indicated.    CT Chest / Abdomen / Pelvis w/ Contrast (7/25/2024, personally reviewed):     Lung bases: Left-sided pleural effusion, left parenchymal consolidation, and  findings of interstitial lung disease.  More nodular density in the medial  aspect of the left lower lobe measuring 1.6 x 1.9 cm.  Cardiomegaly with a  pericardial effusion.     Liver: The liver is normal in size.  There is no focal mass.  Hepatic veins and  portal veins are patent without focal filling defects to suggest thrombosis..     Gallbladder:  Small dependent gallstones.  No gallbladder wall thickening..     Spleen: Spleen is normal in size without focal mass lesion..     Pancreas: The pancreas is normal without focal mass, parenchymal atrophy, or  pancreatic duct dilation..     Adrenals: The adrenals are normal bilaterally without focal mass..     Kidneys, ureters and bladder:  Symmetric enhancement and excretion..  Left lower  pole cystic lesion measures 3.2 cm in maximal dimension.  This  measures greater  than simple fluid density.  This measured 60 Hounsfield units on the noncontrast  CT from 2020.  It measures 36 Hounsfield units on today's study.  Therefore it  is most in keeping with a hemorrhagic/proteinaceous cyst.  Additional  hypodensities too small to characterize.     Retroperitoneal structures: There is no abdominal aortic aneurysm.  Atherosclerotic calcification.  There is no retroperitoneal adenopathy or  retroperitoneal mass..     Bowel and mesentery: No evidence of a focal inflammatory or obstructive process.  Moderate fecal retention throughout the colon.  There are a few fluid-filled  small bowel loops in the pelvis, nonspecific.     Lymph nodes: No pathologic lymphadenopathy..     Pelvis: The uterus is normal in size.  The ovaries are normal.  There is no  adnexal mass or pelvic free fluid.     Bones: Within normal limits.    No evidence for pulmonary embolism.  Persistent left-sided pleural effusion and airspace disease at the left lung  base.  Underlying interstitial lung disease.     TTE (7/19/2024, personally reviewed):   Mild increased wall thickness with normal left ventricular cavity and preserved systolic function. EF 65%. No wall motion abnormalities.   Normal right ventricular size with preserved systolic function.   IVC normal in size with >50% respiratory variation consistent with normal right sided filling pressures.   Small pericardial effusion. No evidence of hemodynamic compromise with normal respiratory variation and no chamber collapse. Prominent epicardial fat.   Compared with previous study of 5/23/2024, small pericardial effusion persists.      PET/CT Skull-to-Thigh (6/4/2024):   An approximately 1.6 cm pulmonary nodule at the medial left lung base is  significantly FDG avid at max SUV 8.5.  This is nonspecific and can be seen in  the setting of benign pathology however malignancy is a concern.     There is more mild to moderate nonspecific uptake localizing to  lita-fissural  nodules particularly on the left.     There is  moderate nonspecific left hilar and mediastinal dawson uptake.     No additional suspicious FDG avid findings appreciated.     CTA Chest (5/21/2024):   IMPRESSION:  1.  No evidence of acute pulmonary embolism.  2.  Multiple new perifissural left lower lobe nodules, suspicious for a  neoplastic process. PET/CT and or tissue sampling is recommended.  3.  Chronic interstitial lung disease in an NSIP pattern, with overall slightly  progressed appearance since March 2023. Pulmonary consultation is recommended.  4.  Stable cardiomegaly and large pericardial effusion.  5.  Mediastinal and supraclavicular adenopathy, similar to prior study, also  indeterminate, which could be further evaluated at time of PET/CT.     CTA Chest (4/16/2024):   IMPRESSION:  1. No evidence of pulmonary embolism.  2. Stable cardiomegaly and large pericardial effusion. Cardiology consultation  suggested.  3. Emphysema with bilateral lower lobe groundglass opacity and bronchiectasis in  an NSIP pattern, which can be seen with scleroderma. Pulmonary consultation  suggested.  4. Stable 16 mm left lower lobe masslike density which may represent  atelectasis, lung cancer or lymphoma. When clinically appropriate PET/CT  suggested.  5. Stable prominence of the main pulmonary artery which can be seen with  pulmonary hypertension     TTE (5/23/2024, personally reviewed):  Mild increased wall thickness with normal left ventricular cavity and preserved systolic function. EF 65%. No wall motion abnormalities.   Normal right ventricular size with preserved systolic function.   Tiny IVC with >50% respiratory variation consistent with low-normal right sided filling pressures.   Small pericardial effusion, predominantly adjacent to right atrium. No evidence of hemodynamic compromise with normal respiratory variation and no chamber collapse. Prominent epicardial fat.   Compared with previous study of  4/10/24, small pericardial effusion persists.      TTE (4/10/2024, personally reviewed):  1. Normal left ventricular cavity size with mild concentric left ventricular hypertrophy. Normal systolic function. EF: 60%. No regional wall motion abnormalities. Cannot determine diastolic function. Global longitudinal strain not reported due to technical limitations of study.   2. Aortic valve has three cusps. Trace aortic regurgitation. Aortic valve and root sclerosis.  3. Thickened mitral valve leaflets. Trace mitral valve regurgitation. Normal left atrial size.   4. Normal right ventricular structure and function. Trace tricuspid valve regurgitation with estimated RVSP: 51 mmHg (assuming right atrial pressure of 3 mmHg). Normal right atrial size. Pulmonic valve structurally normal. Trace pulmonic valve regurgitation. Pulsed wave Doppler of the RVOT systolic profile shows decreased acceleration times and geometry consistent with mildly elevated PVR.  5. IVC size is normal with > 50% inspiratory collapse consistent with normal right atrial pressure. Small-to-moderate sized, circumferential pericardial effusion. No echocardiographic evidence for cardiac tamponade.   6. Compared to previous study from 3/4/2024, no significant change. Pericardial effusion size is similar, estimated right atrial pressure lower.           CTA Abd / Pelvis (3/13/2024, personally reviewed):   There is dilatation of the of proximal/mid small bowel with relatively abrupt  transition left hemiabdomen, likely obstruction. Questionable lesion at the  transition point. This could be better evaluated with CT or MRI enterography.     Aorta and major branches patent. Superior mesenteric artery patent without  significant stenosis.  Suspect moderate stenosis of the origins of the of renal arteries bilaterally.     Large pericardial effusion, no change.     Patchy opacities lower lungs, similar to prior.      Coronary CTA (3/11/2024):   1. Two-vessel  coronary artery disease as above that is moderate in severity.  CT  FFR is normal..  2. There is mitral annular calcification and aortic sclerosis.  The right atrium  is enlarged.  There is left ventricular hypertrophy.  There is a moderate to  large circumferential pericardial effusion.  3. Normal left ventricular wall motion and systolic function.  4. Coronary calcium score 313, 96th percentile     TTE (3/4/2024, personally reviewed):   Normal-sized left ventricle. Mild left ventricular hypertrophy. Preserved systolic function. LVEF 60%. No regional wall motion abnormalities. Grade II diastolic dysfunction.  Normal global longitudinal strain (-20%).  The aortic valve is not well seen.  Cannot determine the number of cusps.  Sclerotic leaflets.  No aortic stenosis.  Trace aortic insufficiency.  Normal sized aortic root.  The visualized portion of the ascending aorta is normal in size.  The mitral valve leaflets are thickened. Mild mitral annular calcification. Trace mitral regurgitation.   Mildly dilated left atrium.  Normal-sized right ventricle. Normal right ventricular systolic function.  Thickened tricuspid valve. There is mild tricuspid regurgitation with estimated RVSP of 46 mmHg.   Pulmonic valve is not well visualized. Trace pulmonic regurgitation.   Mildly dilated right atrium.  IVC is enlarged with <50% respiratory variation consistent with elevated right atrial filling pressure (at least 15 mmHg).   Moderate, circumferential, circumferential pericardial effusion.  The largest pocket is located inferolaterally.  Elevated right atrial pressure.  No other clear echocardiographic features of increased pericardial pressure.  Correlate clinically.   Compared to previous TTE from 6/21/2023, pericardial effusion now moderate in size.  Right atrial pressure now elevated.         CTA Chest (3/3/2024):   IMPRESSION:  1.  No evidence of acute pulmonary embolism to the level of the segmental  branches.  2.  Moderate  to large pericardial effusion measuring higher than simple fluid  density.  3.  Lower lobe lung field predominant interstitial thickening with some relative  subpleural sparing, consistent with a chronic interstitial lung disease,  unchanged from the prior study.  4.  Continued bilateral axillary, mediastinal, and bilateral hilar  lymphadenopathy, not definitely changed from the prior study, possibly related  to the patient's sarcoid.  5.  Emphysema.      CTA Chest PE (10/4/2023):  IMPRESSION:  1. No evidence for filling defect or vessel cutoff to suggest pulmonary  embolism.  Enlargement of the pulmonary arteries compatible with pulmonary  arterial hypertension.  2. Changes throughout the lungs as described above which can be seen with  systemic sclerosis/scleroderma.  Underlying pneumonia the lung bases cannot be  entirely excluded in the proper clinical setting.  3.  Additional stable findings as described above.        VQ (9/12/2023):  IMPRESSION:  Intermediate to high probability of pulmonary embolic disease.     6MWT (7/25/2023, on Sildenafil 20 mg TID):        TTE (6/21/2023, personally reviewed):  1. Normal left ventricular cavity size and mild concentric left ventricular hypertrophy. Normal systolic function. EF: 60%. No regional wall motion abnormalities. Grade I diastolic dysfunction.   2. Aortic valve cusps not well visualized. Trace aortic regurgitation. Aortic valve and root sclerosis.  3. Thickened mitral valve leaflets. Mild mitral annular calcification. Trace mitral regurgitation. Mildly dilated left atrial size.   4. Normal right ventricular structure and function. Mild tricuspid valve regurgitation with estimated RVSP: 55 mmHg (assuming right atrial pressure of 3 mmHg). Normal right atrial size. Pulmonic valve not well visualized. Trace pulmonic valve regurgitation. Pulsed wave Doppler of the RVOT systolic profile show decreased acceleration times ( msec) and late systolic notching  consistent with elevated PVR.   5. IVC size is normal with > 50% inspiratory collapse consistent with normal right atrial pressure. Small pericardial effusion without echocardiographic evidence of tamponade.  6. Compared to previous study from 4/14/2023, estimated right ventricular systolic pressure is higher.        TTE (4/14/2023, personally reviewed):   The left ventricular cavity size is normal with mild left ventricular hypertrophy and preserved systolic function. Estimated EF 65%. No regional wall motion abnormalities. Grade I diastolic dysfunction.     The aortic valve is tricuspid. Aortic valve sclerosis. Trace aortic regurgitation.      The visualized portions of the aortic root and ascending aorta are normal in size.     The mitral valve is mildly thickened. Mild mitral annular calcification. Trace mitral regurgitation.       The left atrium is severely dilated.      The right ventricle is normal in size with preserved systolic function     The pulmonic valve is not well seen.     The tricuspid valve is normal in appearance. mild tricuspid regurgitation with an estimated RVSP of 34 mmHg.       Right atrium is normal in size.     The IVC is small and collapses > 50% with inspiration consistent with normal right atrial pressures.     Small pericardial effusion, mostly posterior.       Compared to previous echocardiogram from 8/22/2022, there is no significant change        TTE (11/28/2022, personally reviewed):   Normal right- and mildly elevated left-sided filling pressures (RA: 3 mmHg, PCWP: 13 mmHg)  Elevated pulmonary artery pressures (60/22(35 mmHg)) in the setting of PCWP: 13 mmHg.   Normal cardiac output and index (CO: 5.1 L/min, CI: 3.2 L/min/m2)  PVR: 4.3 Wood units. SVR: 1840 dynes/sec/cm5      TTE (8/22/2022, personally reviewed):  Normal-sized LV. Normal LV systolic function. Estimated EF 55- 60%. Wall motion appears grossly normal. Mild concentric left ventricular hypertrophy. Grade I LV  diastolic dysfunction.  Pulmonic valve not well visualized. Grossly normal pulmonic valve structure. Trace pulmonic valve regurgitation. No pulmonic valve stenosis.  Aortic valve not well visualized. Aortic valve not well seen but likely trileaflet. Focal aortic valve calcification present. Sclerotic aortic valve leaflets. Mild aortic valve regurgitation. No aortic valve stenosis.  RV not well visualized. Normal-sized RV. Normal RV systolic function.  Normal-sized RA.  There is a small loculated pericardial effusion anterior to the right atrium. No cardiac tamponade.  Sclerotic mitral valve. Mitral valve calcification of the anterior leaflet present.  Trace mitral valve regurgitation.  No significant mitral valve stenosis.  Normal-sized IVC. IVC demonstrates normal respiratory collapse.  Tricuspid valve structure is grossly normal. Mild to moderate tricuspid valve regurgitation.  Estimated RVSP = 50-55 mmHg.  Mildly dilated LA.  No previous echocardiogram available for comparison.     TTE (4/21/2022, outside study):  This result has an attachment that is not available.     A complete transthoracic echocardiogram (including 2D, color flow   Doppler, spectral Doppler and M-mode imaging) was performed using the   standard protocol. The study quality was adequate.     The left ventricle is normal in size. There is moderately increased   wall thickness consistent with moderate concentric hypertrophy.   Endocardium is incompletely visualized, but no regional wall motion   abnormalities are noted in the visualized segments. Normal left   ventricular ejection fraction. The left ventricular ejection fraction by   visual estimate is 55% to 60%.     The right ventricle is normal in size. There is normal function of the   right ventricle.     The left atrium is normal in size. Left atrial volume is 58 mL.     The right atrial volume measures 52 mL. The right atrial volume index   measures 34 mL/m2.     There is trace mitral  regurgitation. There is no mitral stenosis.     The aortic valve is trileaflet. There is mild aortic valve thickening.   There is no aortic stenosis. There is trace aortic regurgitation.     There is mild to moderate tricuspid regurgitation. Pulmonary artery   systolic pressure measures 52 mmHg. The pulmonary artery systolic pressure   is moderately elevated.     The aorta measures 3.2 cm at the sinus of Valsalva. The proximal   segment of the ascending aorta is mildly enlarged. The proximal segment of   the ascending aorta measures 3.4 cm. The proximal segment of the ascending   aorta measures 2.2 cm/m2, indexed for body surface area.     Trivial pericardial effusion present. There is no echocardiographic   evidence of cardiac tamponade.     The inferior vena cava is normal in size (diameter <21 mm) and   decreases >50% in size with inspiration, suggesting a normal right atrial   pressure of 3 mmHg (range 0-5 mm Hg).     Compared to the prior study of 3/13/2020, there are no significant   changes.        PFT (3/23/2022):      PFT (7/14/2021):      CT Chest (5/2/2021, outside study):  CLINICAL INFORMATION: Systemic sclerosis. Interstitial lung disease. Groundglass nodule     PROCEDURE: Unenhanced CT of the chest was performed using thin section reconstructions. Images were obtained on inspiration and expiration.     COMPARISON: June and August 2020     FINDINGS:     LUNGS, PLEURA:     Groundglass opacities with reticulation with subpleural sparing in the lower lobes, without honeycombing or architectural distortion, unchanged.     Right upper lobe groundglass nodule, image 114 of series 3, 8 x 11 mm, previously 6 mm.     Expiratory images are of diagnostic quality and do not demonstrate evidence of clinically significant air trapping.     CARDIOVASCULAR, MEDIASTINUM, THYROID: Coronary calcifications. Trace pericardial effusion.     LYMPH NODES: Subcentimeter mediastinal lymph nodes, unchanged. Unchanged axillary  lymph nodes.     SKELETON, CHEST WALL: No destructive bone lesion     UPPER ABDOMEN: Patulous esophagus. 3 mm right renal stone.       RHC (9/02/2020):  RA   8 mmHg   RV   40/5 mmHg   PA (mean)  44/16 (25) mmHg   PCWP   12 mmHg   CO   4.65 lpm (Thermodilution)   CI   2.65 lpm/m-squared   SVI    33 ml/beat/m-squared (lower limit normal 29)   PVR   2.8 mmHg/l/min (Wood units)   SVR   2064 dyne-sec-cm-5         PFT (3/18/2020):      TTE (3/13/2020, outside study):  · The study quality was good.   · The left ventricle is normal in size. Top normal left ventricular wall   thickness. There are no segmental wall motion abnormalities. The left   ventricular ejection fraction is 55-60% by visual estimate and 3D   echocardiography. Normal left ventricular ejection fraction.   · There is grade I (mild) LV diastolic dysfunction.   · The right ventricle is normal in size. There is normal function of the   right ventricle.   · The right atrium is mildly dilated.   · There is mild mitral valve anterior leaflet thickening. There is mild   mitral valve leaflet calcification. There is flattening of the mitral   leaflets without raphael prolapse. There is trace mitral regurgitation.   · The aortic valve is trileaflet. There is mild thickening of the aortic   valve. There is mild calcification of the aortic valve. There is no aortic   /stenosis. There is trace aortic regurgitation.   · There is mild tricuspid valve leaflet thickening. There is mild to   moderate tricuspid regurgitation. The pulmonary artery systolic pressure   is moderately elevated. Pulmonary artery systolic pressure measures 50   mmHg. There is mid-systolic notching of the RVOT VTI consistent with   elevated pulmonary vascular resistance.   · The inferior vena cava is normal in size (diameter <21 mm) and decreases   >50% in size with inspiration, suggesting a normal right atrial pressure   of 3 mmHg (range 0-5 mm Hg).   · Trivial loculated pericardial effusion present  adjacent to the right   ventricle and adjacent to the right atrium.          Assessment / Plan:     1. Chest Pain:  - Given pattern and character, I believe this is musculoskeletal / serous pain from her autoimmune disease. There may be an element of myopericarditis.  - She reports that pain has not responded to increased in steroids in the past   - Non-ACS chest pain and known non-obstructive CAD as recently as March 2024 CCTA.   -A lidocaine patch was placed just prior to me entering the room.  Hopefully this will help her pain.    2. Pulmonary Hypertension (WHO Group I, NYHA/WHO FC III):   - Occurring in the background of Systemic Scleroderma with ILD. August 2022 TTE showed normal RV size and function, but progressive exertional decline, worsened DLCO and resting tachycardia suggest there may be progression of her pulmonary vascular disease and limitation of cardiac output. This was proven with 11/2022 RHC showing mean PA 35 mmHg (with PCWP 13 mmHg) and PVR 4.3 Wood units. Recent TTEs have shown appropriate RV:PA coupling with normal RV size and systolic function.   - She is hypervolemic on exam  - CXR improved today  -She was switched to ethacrynic acid 50 mg p.o. daily. This is higher than home dose so hopefully will achieve diuresis.  Please start daily standing weights, discussed with RN.  Please start recording I's and O's.    - Continue Sildenafil 20 mg TID   - She should continue uninterrupted Macitentan 10 mg daily -her home medication has been brought in and she is receiving it.    3. HTN:   - Continue Amlodipine.     4. Systemic Scleroderma / Raynaud's Disease:   - Per Rheumatology (Dr. Trevizo).      5. ILD:   - Per Pulmonology and Rheumatology (Dr. Trevizo). She has not tolerated trials of Mycophenolate. She briefly received Tocilizumab.     6. PE:   - She has had elevated probability on V/Q scans, but CTA Chest has consistently not shown evidence for acute or chronic thromboembolism. Apixaban has been  discontinued.     7. CAD:   - Two-vessel non-obstructive disease on March 2024 CCTA. She is continued on Atorvastatin 40 mg daily.     8. Toe Ulcerations  - Per Rheumatology, unlikely to see LE ulcerations with scleroderma. S/P left PTA dSFA/TP and DCB to popliteal arteries 8/14/24. Followed by vascular surgery. Started on Plavix    9. Lung Mass  - Pathology from 8/8 biopsy with atypical glandular cells, possibly consistent with adenocarcinoma  - Management per pulmonology    KARYNA Carmona  8/15/2024

## 2024-08-15 NOTE — ASSESSMENT & PLAN NOTE
Pt reporting lower quadrant abdominal pain day after angio.  No evidence or bruising at fem access site or palpable masses, but CT demonstrated R RP hematoma. Also having intermittent nausea/vomiting.  LFTs unremarkable and lipase within normal limits.  CTAP showed an unremarkable gallbladder, pancreas.  UA bland.  CT did reveal a hematoma in the right inguinal region. She is afebrile without leukocytosis.    Her nausea/vomiting is thought to be multifactorial in the setting of constipation and medication side effects.      KUB with extensive stool retention in the colon, no evidence of obstruction  Discussed with GI who saw earlier during her course for possible CGE, will schedule IV zofran and give half miralax prep. Give tap water enema. Schedule supps. If this does not work, will consider methylnaltrexone for opioid induced constipation. Consider asking GI to formally see tomorrow if no improvement.  Encouraged pt to not take medications on an empty stomach.   PRN pepcid and increase Protonix to 40mg daily in case GERD playing role  I also think anxiety is playing a significant role in her symptoms, I have discussed this with the patient.

## 2024-08-15 NOTE — PROGRESS NOTES
Vascular Surgery Free Text Progress Note [id number]    Notified by primary team that patient was having blood tinged sputum vs coffee ground emesis earlier today. Decision was made to pursue CTAP without IV contrast to evaluate for possible GI bleed. Alerted by primary team and radiology that there was a hematoma seen in the right inguinal/pelvic region. Assessed patient's R groin which was soft and non-tender. Dermabond was clean, dry, and intact. No hematoma appreciated. The RLE remains warm with intact pulses. From a vascular perspective, ok to hold tomorrow morning's Plavix dose in the setting of hemoptysis/concern for GI bleed. Resume Plavix when medically deemed stable.    Gaetano Khalil MD  General Surgery PGY-1  Pager: [phone]

## 2024-08-15 NOTE — SIGNIFICANT EVENT
Notified by nursing that pt was having dark tinged sputum/emesis that appeared to be coffee ground with saliva.  Pt w/ ongoing abdominal pain, nausea s/p Plavix loaded yesterday s/p LLE angio.  Pt evaluated at bedside, CT scan was ordered this am and pt was transported.  Ordered for BID IV PPI and placed GI consult.  Given recent procedure, discussed possible holding plavix but will wait pending imaging and stat CBC.    Simon Cano, DO

## 2024-08-15 NOTE — PROGRESS NOTES
Daily Progress Note    Subjective  63 y.o. F a/f volume overload + pain control (s/p EBUS LLL lung nodule 8/8) c/f bilat pop and distal fem stenosis. S/p LLE angio, balloon angio of distal SFA, TP trunk, DCB angio of popliteal artery on 8/14.     Interval History  NAEON. This morning patient reports that she had a terrible night and did not sleep. She also states that around 5 AM she had some episodes of nausea and emesis. Her pain is poorly controlled at the moment and she reports aching all over her body. When asked how her pain was in her extremities, she lifted with her left leg to signal that it was her only extremity that was in pain. She denies fevers, shortness of breath, trouble breathing. She did report intermittent chills because of the temperature of her room.     Objective  Vital signs in last 24 hours:  Temp:  [36.4 °C (97.6 °F)-37.2 °C (99 °F)] 36.9 °C (98.5 °F)  Heart Rate:  [66-94] 66  Resp:  [14-20] 16  BP: (114-145)/(57-74) 134/62      Intake/Output Summary (Last 24 hours) at 8/15/2024 0639  Last data filed at 8/15/2024 0445  Gross per 24 hour   Intake 480 ml   Output 300 ml   Net 180 ml       Labs  Lab Results   Component Value Date    WBC 5.64 08/13/2024    HGB 10.3 (L) 08/13/2024    HCT 34.3 (L) 08/13/2024     08/13/2024    CHOL 148 03/07/2024    TRIG 70 03/07/2024    HDL 49 (L) 03/07/2024    ALT 8 07/25/2024    AST 15 07/25/2024     08/13/2024    K 3.8 08/13/2024     08/13/2024    CREATININE 1.0 08/13/2024    BUN 23 08/13/2024    CO2 29 08/13/2024    TSH 0.33 (L) 07/19/2024    INR 1.0 07/22/2024    HGBA1C 4.4 04/13/2023       Physical Exam  General appearance: alert, cooperative, sitting in bed  Head: normocephalic, without obvious abnormality, atraumatic  Neck: supple, symmetrical, trachea midline   Lungs: no increased breathing effort, bilateral chest rise & fall   Heart: regular rate and rhythm  Abdomen: soft, nondistended  Extremities: right groin site without hematoma,  tender to palpation. Left anterior ankle wound dressing present. Right lateral ankle wound dressing present  Pulses: palpable LLE DP, BL dopplerable DP, AT, PT  Skin: Skin color & texture normal. No rashes or lesions  Neurologic: grossly normal    Imaging  I have reviewed the imaging in the past 24 hours     Assessment & Plan  63 y.o. female s/p LLE angio, balloon angio of distal SFA, TP trunk, DCB angio of popliteal artery on 8/14     -Multimodal pain control - patient currently is only ordered tylenol and lidocaine patches with no PRN pain medication   -Plavix 75 mg daily starting today   -OOB/light walking as tolerated   -Continue regular diet   -Rest of care per primary team     RAS Bhakta  General Surgery  Please page [phone] with any questions

## 2024-08-15 NOTE — PROGRESS NOTES
Subjective:   Pt seen at bedside for bilateral lower extremity wounds with dry, stable gangrene of bilateral second digits. Patent is tired this morning, but otherwise in good spirits. Pt denies any significant pedal pain, however the Left 2nd digit is more painful than others.. Dressing clean, dry, and intact. Denies any N/V/F/C/CP/SOB.        Past Medical/Surgical history, Allergies, Meds reviewed in detail as charted  FH/SH reviewed in detail as charted    ROS:  Head and Neck: No complaints  Chest : No complaints  Abdomen: No Complaints  Constitutional: Unremarkable     Vitals: I have reviewed the patient's pertinent vital signs.     Radiology: Reviewed     Labs:   CBC Results         08/15/24 08/13/24 08/09/24     0901 0958 1938    WBC 12.22 5.64 9.38    RBC 3.61 4.22 4.49    HGB 8.7 10.3 10.7    HCT 28.7 34.3 36.7    MCV 79.5 81.3 81.7    MCH 24.1 24.4 23.8    MCHC 30.3 30.0 29.2     199 285           Comment for WBC at 0901 on 08/15/24: ALL RESULTS HAVE BEEN CHECKED          BMP Results         08/15/24 08/13/24 08/09/24     0901 0958 1938     141 140    K 4.3 3.8 3.6    Cl 103 106 106    CO2 26 29 24    Glucose 99 113 97    BUN 47 23 18    Creatinine 1.4 1.0 1.3    Calcium 9.0 9.4 9.5    Anion Gap 10 6 10    EGFR 42.4 >60.0 46.3           Comment for BUN at 0901 on 08/15/24: Result rechecked    Comment for EGFR at 0901 on 08/15/24: Calculation based on the Chronic Kidney Disease Epidemiology Collaboration (CKD-EPI) equation refit without adjustment for race.    Comment for EGFR at 0958 on 08/13/24: Calculation based on the Chronic Kidney Disease Epidemiology Collaboration (CKD-EPI) equation refit without adjustment for race.    Comment for EGFR at 1938 on 08/09/24: Calculation based on the Chronic Kidney Disease Epidemiology Collaboration (CKD-EPI) equation refit without adjustment for race.            LE Objective:   -Vascular: DP pulses are faintly palpable B/L. PT pulses are faintly palpable  B/L.   B/L edema to the foot, ankle and leg. Capillary refill time is less than 3 seconds B/L.   Skin temperature is warm to slightly cool from leg to toes B/L.                       -Ortho: + active df/pf of all digits B/L. Ankle ROM normal B/L. MMT 5/5 in all planes tested B/L.  Multiple digital contractures noted B/L  -Neuro: Light touch and protective sensation is intact B/L    -Derm:   RLE:   -Well adhered eschar noted to the distal aspect of the second digit.  No drainage, malodor, deep probing, fluctuance, crepitus noted  -Small fibrotic wound noted to medial malleolus which is epitheliazed.  Wound bed is dry.  No malodor, fluctuance, crepitus, drainage, deep probing noted    LLE:  -Full-thickness fibrotic wound noted to medial aspect of hallux measuring approximately 1.2 cm x 1 cm x 0.2 cm.  No drainage, malodor, deep probing, fluctuance, crepitus noted.  Wound edges intact.  No probe to bone appreciated.  -Small fibrotic wound noted to medial malleolus measuring approximately 1 cm x 1 cm x 0.1 cm.  Wound bed is dry.  No malodor, fluctuance, crepitus, drainage, deep probing noted  -Early gangrenous changes noted to distal aspect of left second toe measuring approximately 0.5 cm x 0.5 cm.    -Small partial thickness wound to distal lateral aspect of the 5th digit. No probing, fluctuance, drainage, purulence appreciated. No malodor. Wound bed is dry with edges intact.           See media tab for clinical pictures.        Assessment:  63-year-old female with bilateral lower extremity wounds and bilateral second digit dry gangrene.    Plan:     -Patient was seen evaluated and treated with all questions and concerns addressed  -Wounds were dressed with medihoney, gauze, and mepilex  -Labs reviewed  -ENRRIQUE/PVIs ordered for further assessment of non-palpable pulses.    Right ankle ENRRIQUE (DP): 0.74.  Right ankle ENRRIQUE (PT): 0.88.     Left ankle ENRRIQUE (DP): 0.90.  Left ankle ENRRIQUE (PT): 0.62.  -Patient underwent LLE  revascularization procedures 8/14  -Patient may be weightbearing as tolerated to bilateral lower extremity  -Patient may follow-up outpatient with podiatry upon discharge. Podiatry signing off at this time.   -dvt ppx and pain management per primary team   -Please contact on call podiatry resident with any questions or concerns.     Tod Ricks IV PGY-1  #[phone]

## 2024-08-15 NOTE — PROGRESS NOTES
Patient: Arielle Hammondstower  Location: [place] [address]  MRN:  [id number]  Today's date:  8/15/2024    Attempted to see patient for therapy. Unable due to patient refused (Pt politely deferring, endorsing abdominal pain/nausea. OT to follow up as able and appropriate).

## 2024-08-15 NOTE — PROGRESS NOTES
[facility] (Vascular). Pager: [phone]   DAILY PROGRESS NOTE      Subjective   63 y.o. female ***                Interval History: CARLOS AEO. Was seen resting comfortably in bed. Endorses pain is well controlled. ***    Denies chest pain, fever, chills, nausea, vomiting.        Objective     Vital signs in last 24 hours:  Temp:  [36.4 °C (97.6 °F)-37.2 °C (99 °F)] 36.9 °C (98.5 °F)  Heart Rate:  [66-94] 66  Resp:  [14-20] 16  BP: (114-145)/(57-74) 134/62       Intake/Output Summary (Last 24 hours) at 8/15/2024 0511  Last data filed at 8/15/2024 0441  Gross per 24 hour   Intake 480 ml   Output 300 ml   Net 180 ml      Intake/Output this shift:  I/O this shift:  In: 480 [P.O.:480]  Out: 300 [Urine:300]        Labs:  BMP Results         08/13/24 08/09/24 07/25/24     0958 1938 1420     140 139    K 3.8 3.6 4.3    Cl 106 106 103    CO2 29 24 27    Glucose 113 97 86    BUN 23 18 17    Creatinine 1.0 1.3 1.1    Calcium 9.4 9.5 10.6    Anion Gap 6 10 9    EGFR >60.0 46.3 56.6           Comment for K at 1420 on 07/25/24: Results obtained on plasma. Plasma Potassium values may be up to 0.4 mEQ/L less than serum values. The differences may be greater for patients with high platelet or white cell counts.    Comment for EGFR at 0958 on 08/13/24: Calculation based on the Chronic Kidney Disease Epidemiology Collaboration (CKD-EPI) equation refit without adjustment for race.    Comment for EGFR at 1938 on 08/09/24: Calculation based on the Chronic Kidney Disease Epidemiology Collaboration (CKD-EPI) equation refit without adjustment for race.    Comment for EGFR at 1420 on 07/25/24: Calculation based on the Chronic Kidney Disease Epidemiology Collaboration (CKD-EPI) equation refit without adjustment for race.           CBC Results         08/13/24 08/09/24 07/25/24     0958 1938 1420    WBC 5.64 9.38 10.29    RBC 4.22 4.49 4.64    HGB 10.3 10.7 11.2    HCT 34.3 36.7 37.3    MCV 81.3 81.7 80.4    MCH 24.4 23.8 24.1    MCHC 30.0  "29.2 30.0     255 649                Imaging:  I have reviewed the Imaging from the last 24 hrs.  No results found.        Physical Exam:  General appearance: alert, appears stated age and cooperative ***  HEENT: normocephalic, no JVD, trachea midline, atraumatic  Lungs: No increased work of breathing.   Chest wall: no tenderness  Heart: Regular rate and rhythm.   Abdomen: soft, non-tender, non-distended ***  Extremities: extremities warm and well-perfused.  Pulses: 2+ and symmetric, except as noted. ***  Skin: Skin color, texture, turgor normal.  Neurologic: Grossly normal. Moves all four extremities spontaneously.         Assessment/Plan   63 y.o. female s/p ***. 1 Day Post-Op     - Multi-modal pain control; ***  - IV-fluids  - f/u morning labs  - IS/OOB  - {Anemia Post-op:65634::\"Patient is pre-op, hemoglobin within 2g of baseline.\",\"Patient is post-op, no post-op HgB drawn / pending.\",\"Patient is post-op, without acute anemia <2g.\",\"Patient is post-op, expected anemia >2g from baseline.\",\"Patient is post-op, anemia >2g from baseline as complication of ***.\",\"***\"}    - appreciate *** recs.      Kush Henao MD  General Surgery Resident  Please page *[phone] with any questions or concerns.     "

## 2024-08-15 NOTE — PROGRESS NOTES
Patient: Arielle Felix  Location: [place] [address]  MRN:  [id number]  Today's date:  8/15/2024    Attempted to see patient for therapy. Unable due to patient refused (Pt not feeling well with abdonmal pain and nausea, Will follow up for PT eval as appropriate.).

## 2024-08-16 ENCOUNTER — APPOINTMENT (INPATIENT)
Dept: RADIOLOGY | Facility: HOSPITAL | Age: 64
DRG: 515 | End: 2024-08-16
Attending: SURGERY
Payer: COMMERCIAL

## 2024-08-16 ENCOUNTER — APPOINTMENT (INPATIENT)
Dept: RADIOLOGY | Facility: HOSPITAL | Age: 64
DRG: 515 | End: 2024-08-16
Attending: INTERNAL MEDICINE
Payer: COMMERCIAL

## 2024-08-16 PROBLEM — K68.3 RETROPERITONEAL HEMATOMA: Status: ACTIVE | Noted: 2024-08-16

## 2024-08-16 LAB
ANION GAP SERPL CALC-SCNC: 6 MEQ/L (ref 3–15)
BASOPHILS # BLD: 0.01 K/UL (ref 0.01–0.1)
BASOPHILS # BLD: 0.04 K/UL (ref 0.01–0.1)
BASOPHILS # BLD: 0.04 K/UL (ref 0.01–0.1)
BASOPHILS NFR BLD: 0.1 %
BASOPHILS NFR BLD: 0.3 %
BASOPHILS NFR BLD: 0.5 %
BUN SERPL-MCNC: 45 MG/DL (ref 7–25)
CALCIUM SERPL-MCNC: 8.8 MG/DL (ref 8.6–10.3)
CHLORIDE SERPL-SCNC: 105 MEQ/L (ref 98–107)
CO2 SERPL-SCNC: 29 MEQ/L (ref 21–31)
CREAT SERPL-MCNC: 1.4 MG/DL (ref 0.6–1.2)
DIFFERENTIAL METHOD BLD: ABNORMAL
EGFRCR SERPLBLD CKD-EPI 2021: 42.4 ML/MIN/1.73M*2
EOSINOPHIL # BLD: 0 K/UL (ref 0.04–0.36)
EOSINOPHIL # BLD: 0.23 K/UL (ref 0.04–0.36)
EOSINOPHIL # BLD: 0.3 K/UL (ref 0.04–0.36)
EOSINOPHIL NFR BLD: 0 %
EOSINOPHIL NFR BLD: 1.6 %
EOSINOPHIL NFR BLD: 3.5 %
ERYTHROCYTE [DISTWIDTH] IN BLOOD BY AUTOMATED COUNT: 19.1 % (ref 11.7–14.4)
GLUCOSE SERPL-MCNC: 83 MG/DL (ref 70–99)
HCT VFR BLD AUTO: 25.7 % (ref 35–45)
HCT VFR BLD AUTO: 26.3 % (ref 35–45)
HCT VFR BLD AUTO: 27.7 % (ref 35–45)
HGB BLD-MCNC: 7.7 G/DL (ref 11.8–15.7)
HGB BLD-MCNC: 7.8 G/DL (ref 11.8–15.7)
HGB BLD-MCNC: 8.1 G/DL (ref 11.8–15.7)
IMM GRANULOCYTES # BLD AUTO: 0.03 K/UL (ref 0–0.08)
IMM GRANULOCYTES # BLD AUTO: 0.06 K/UL (ref 0–0.08)
IMM GRANULOCYTES # BLD AUTO: 0.09 K/UL (ref 0–0.08)
IMM GRANULOCYTES NFR BLD AUTO: 0.4 %
IMM GRANULOCYTES NFR BLD AUTO: 0.5 %
IMM GRANULOCYTES NFR BLD AUTO: 0.6 %
LYMPHOCYTES # BLD: 0.58 K/UL (ref 1.2–3.5)
LYMPHOCYTES # BLD: 1.03 K/UL (ref 1.2–3.5)
LYMPHOCYTES # BLD: 2.06 K/UL (ref 1.2–3.5)
LYMPHOCYTES NFR BLD: 24.2 %
LYMPHOCYTES NFR BLD: 5 %
LYMPHOCYTES NFR BLD: 7.1 %
MCH RBC QN AUTO: 24.2 PG (ref 28–33.2)
MCH RBC QN AUTO: 24.4 PG (ref 28–33.2)
MCH RBC QN AUTO: 24.6 PG (ref 28–33.2)
MCHC RBC AUTO-ENTMCNC: 29.2 G/DL (ref 32.2–35.5)
MCHC RBC AUTO-ENTMCNC: 29.3 G/DL (ref 32.2–35.5)
MCHC RBC AUTO-ENTMCNC: 30.4 G/DL (ref 32.2–35.5)
MCV RBC AUTO: 81.1 FL (ref 83–98)
MCV RBC AUTO: 82.7 FL (ref 83–98)
MCV RBC AUTO: 83.5 FL (ref 83–98)
MONOCYTES # BLD: 0.04 K/UL (ref 0.28–0.8)
MONOCYTES # BLD: 0.2 K/UL (ref 0.28–0.8)
MONOCYTES # BLD: 0.5 K/UL (ref 0.28–0.8)
MONOCYTES NFR BLD: 0.3 %
MONOCYTES NFR BLD: 1.4 %
MONOCYTES NFR BLD: 5.9 %
NEUTROPHILS # BLD: 10.92 K/UL (ref 1.7–7)
NEUTROPHILS # BLD: 13 K/UL (ref 1.7–7)
NEUTROPHILS # BLD: 5.57 K/UL (ref 1.7–7)
NEUTS SEG NFR BLD: 65.5 %
NEUTS SEG NFR BLD: 89 %
NEUTS SEG NFR BLD: 94.1 %
NRBC BLD-RTO: 0 %
PDW BLD AUTO: 11.5 FL (ref 9.4–12.3)
PDW BLD AUTO: 11.7 FL (ref 9.4–12.3)
PDW BLD AUTO: 12.3 FL (ref 9.4–12.3)
PLATELET # BLD AUTO: 127 K/UL (ref 150–369)
PLATELET # BLD AUTO: 155 K/UL (ref 150–369)
PLATELET # BLD AUTO: 185 K/UL (ref 150–369)
POTASSIUM SERPL-SCNC: 4.4 MEQ/L (ref 3.5–5.1)
RBC # BLD AUTO: 3.15 M/UL (ref 3.93–5.22)
RBC # BLD AUTO: 3.17 M/UL (ref 3.93–5.22)
RBC # BLD AUTO: 3.35 M/UL (ref 3.93–5.22)
SODIUM SERPL-SCNC: 140 MEQ/L (ref 136–145)
WBC # BLD AUTO: 11.61 K/UL (ref 3.8–10.5)
WBC # BLD AUTO: 14.59 K/UL (ref 3.8–10.5)
WBC # BLD AUTO: 8.5 K/UL (ref 3.8–10.5)

## 2024-08-16 PROCEDURE — 76936 ECHO GUIDE FOR ARTERY REPAIR: CPT

## 2024-08-16 PROCEDURE — 99231 SBSQ HOSP IP/OBS SF/LOW 25: CPT | Performed by: SURGERY

## 2024-08-16 PROCEDURE — 63700000 HC SELF-ADMINISTRABLE DRUG: Performed by: INTERNAL MEDICINE

## 2024-08-16 PROCEDURE — 36415 COLL VENOUS BLD VENIPUNCTURE: CPT | Performed by: NURSE PRACTITIONER

## 2024-08-16 PROCEDURE — 99233 SBSQ HOSP IP/OBS HIGH 50: CPT | Performed by: NURSE PRACTITIONER

## 2024-08-16 PROCEDURE — 85025 COMPLETE CBC W/AUTO DIFF WBC: CPT | Performed by: NURSE PRACTITIONER

## 2024-08-16 PROCEDURE — 99232 SBSQ HOSP IP/OBS MODERATE 35: CPT | Performed by: INTERNAL MEDICINE

## 2024-08-16 PROCEDURE — 97162 PT EVAL MOD COMPLEX 30 MIN: CPT | Mod: GP

## 2024-08-16 PROCEDURE — 99233 SBSQ HOSP IP/OBS HIGH 50: CPT | Performed by: INTERNAL MEDICINE

## 2024-08-16 PROCEDURE — 97166 OT EVAL MOD COMPLEX 45 MIN: CPT | Mod: GO

## 2024-08-16 PROCEDURE — 85025 COMPLETE CBC W/AUTO DIFF WBC: CPT | Performed by: INTERNAL MEDICINE

## 2024-08-16 PROCEDURE — 74174 CTA ABD&PLVS W/CONTRAST: CPT

## 2024-08-16 PROCEDURE — 63600000 HC DRUGS/DETAIL CODE: Mod: JZ | Performed by: INTERNAL MEDICINE

## 2024-08-16 PROCEDURE — 63600105 HC IODINE BASED CONTRAST: Mod: JZ | Performed by: INTERNAL MEDICINE

## 2024-08-16 PROCEDURE — 21400000 HC ROOM AND CARE CCU/INTERMEDIATE

## 2024-08-16 PROCEDURE — 80048 BASIC METABOLIC PNL TOTAL CA: CPT | Performed by: NURSE PRACTITIONER

## 2024-08-16 RX ORDER — DIPHENHYDRAMINE HCL 25 MG
50 CAPSULE ORAL EVERY 8 HOURS PRN
Status: DISCONTINUED | OUTPATIENT
Start: 2024-08-16 | End: 2024-08-23

## 2024-08-16 RX ORDER — DIPHENHYDRAMINE HCL 50 MG/ML
50 VIAL (ML) INJECTION ONCE
Status: COMPLETED | OUTPATIENT
Start: 2024-08-16 | End: 2024-08-16

## 2024-08-16 RX ORDER — IOPAMIDOL 755 MG/ML
100 INJECTION, SOLUTION INTRAVASCULAR
Status: COMPLETED | OUTPATIENT
Start: 2024-08-16 | End: 2024-08-16

## 2024-08-16 RX ORDER — FENTANYL CITRATE 50 UG/ML
12.5 INJECTION, SOLUTION INTRAMUSCULAR; INTRAVENOUS EVERY 4 HOURS PRN
Status: DISCONTINUED | OUTPATIENT
Start: 2024-08-16 | End: 2024-08-19

## 2024-08-16 RX ADMIN — SILDENAFIL 20 MG: 20 TABLET, FILM COATED ORAL at 20:38

## 2024-08-16 RX ADMIN — POLYETHYLENE GLYCOL 3350 17 G: 17 POWDER, FOR SOLUTION ORAL at 20:38

## 2024-08-16 RX ADMIN — DEXTRAN 70, GLYCERIN, HYPROMELLOSE 1 DROP: 1; 2; 3 SOLUTION/ DROPS OPHTHALMIC at 21:10

## 2024-08-16 RX ADMIN — ACETAMINOPHEN 975 MG: 325 TABLET ORAL at 22:27

## 2024-08-16 RX ADMIN — IOPAMIDOL 100 ML: 755 INJECTION, SOLUTION INTRAVENOUS at 15:50

## 2024-08-16 RX ADMIN — PANTOPRAZOLE SODIUM 40 MG: 40 INJECTION, POWDER, LYOPHILIZED, FOR SOLUTION INTRAVENOUS at 20:38

## 2024-08-16 RX ADMIN — SILDENAFIL 20 MG: 20 TABLET, FILM COATED ORAL at 08:13

## 2024-08-16 RX ADMIN — WHITE PETROLATUM: 1.75 OINTMENT TOPICAL at 20:40

## 2024-08-16 RX ADMIN — CYCLOBENZAPRINE HYDROCHLORIDE 5 MG: 5 TABLET, FILM COATED ORAL at 12:53

## 2024-08-16 RX ADMIN — ACETAMINOPHEN 975 MG: 325 TABLET ORAL at 04:47

## 2024-08-16 RX ADMIN — WHITE PETROLATUM: 1.75 OINTMENT TOPICAL at 08:16

## 2024-08-16 RX ADMIN — LIDOCAINE 4% 1 PATCH: 40 PATCH TOPICAL at 08:14

## 2024-08-16 RX ADMIN — ACETAMINOPHEN 975 MG: 325 TABLET ORAL at 18:14

## 2024-08-16 RX ADMIN — DIPHENHYDRAMINE HYDROCHLORIDE 50 MG: 50 INJECTION INTRAMUSCULAR; INTRAVENOUS at 12:52

## 2024-08-16 RX ADMIN — LEVOTHYROXINE SODIUM 100 MCG: 0.1 TABLET ORAL at 05:49

## 2024-08-16 RX ADMIN — METHYLPREDNISOLONE SODIUM SUCCINATE 125 MG: 125 INJECTION, POWDER, FOR SOLUTION INTRAMUSCULAR; INTRAVENOUS at 12:52

## 2024-08-16 RX ADMIN — ACETAMINOPHEN 975 MG: 325 TABLET ORAL at 12:52

## 2024-08-16 RX ADMIN — SALINE NASAL SPRAY 2 SPRAY: 1.5 SOLUTION NASAL at 08:17

## 2024-08-16 RX ADMIN — SENNOSIDES 2 TABLET: 8.6 TABLET, FILM COATED ORAL at 20:38

## 2024-08-16 RX ADMIN — CYCLOBENZAPRINE HYDROCHLORIDE 5 MG: 5 TABLET, FILM COATED ORAL at 04:47

## 2024-08-16 RX ADMIN — AMLODIPINE BESYLATE 10 MG: 10 TABLET ORAL at 08:13

## 2024-08-16 RX ADMIN — SILDENAFIL 20 MG: 20 TABLET, FILM COATED ORAL at 15:10

## 2024-08-16 RX ADMIN — MINERAL OIL, WHITE PETROLATUM: .03; .94 OINTMENT OPHTHALMIC at 20:39

## 2024-08-16 RX ADMIN — CYANOCOBALAMIN TAB 1000 MCG 1000 MCG: 1000 TAB at 08:13

## 2024-08-16 RX ADMIN — ONDANSETRON 4 MG: 4 TABLET, ORALLY DISINTEGRATING ORAL at 11:28

## 2024-08-16 RX ADMIN — CYCLOBENZAPRINE HYDROCHLORIDE 5 MG: 5 TABLET, FILM COATED ORAL at 23:47

## 2024-08-16 RX ADMIN — BENZOCAINE 6 MG-MENTHOL 10 MG LOZENGES 1 LOZENGE: at 08:13

## 2024-08-16 RX ADMIN — PANTOPRAZOLE SODIUM 40 MG: 40 INJECTION, POWDER, LYOPHILIZED, FOR SOLUTION INTRAVENOUS at 08:13

## 2024-08-16 RX ADMIN — GLYCERIN 1 DROP: .002; .002; .01 SOLUTION/ DROPS OPHTHALMIC at 20:39

## 2024-08-16 ASSESSMENT — COGNITIVE AND FUNCTIONAL STATUS - GENERAL
EATING MEALS: 4 - NONE
STANDING UP FROM CHAIR USING ARMS: 3 - A LITTLE
AFFECT: EMOTIONALLY LABILE;ANXIOUS
CLIMB 3 TO 5 STEPS WITH RAILING: 2 - A LOT
WALKING IN HOSPITAL ROOM: 3 - A LITTLE
CLIMB 3 TO 5 STEPS WITH RAILING: 2 - A LOT
STANDING UP FROM CHAIR USING ARMS: 3 - A LITTLE
TOILETING: 3 - A LITTLE
HELP NEEDED FOR BATHING: 3 - A LITTLE
WALKING IN HOSPITAL ROOM: 3 - A LITTLE
DRESSING REGULAR LOWER BODY CLOTHING: 3 - A LITTLE
HELP NEEDED FOR PERSONAL GROOMING: 3 - A LITTLE
DRESSING REGULAR UPPER BODY CLOTHING: 3 - A LITTLE
STANDING UP FROM CHAIR USING ARMS: 3 - A LITTLE
MOVING TO AND FROM BED TO CHAIR: 3 - A LITTLE
AFFECT: WFL;EMOTIONALLY LABILE
CLIMB 3 TO 5 STEPS WITH RAILING: 2 - A LOT
WALKING IN HOSPITAL ROOM: 3 - A LITTLE

## 2024-08-16 NOTE — PROGRESS NOTES
"Arielle GUAJARDO [facility]   [id number]    Your doctor has referred you for a CT examination that requires the injection of an iodinated contrast material into your bloodstream. This iodinated contrast material, sometimes referred to as \"x-ray dye\" allows for better interpretation of the x-ray films or CT images and results in a more accurate interpretation of the examination.     Without the use of iodinated contrast (x-ray dye), the examination may be less informative and result in a suboptimal examination, and possibly a delay in diagnosis and, if needed, treatment.     The iodinated contrast material is given through a small needle or catheter placed into a vein, usually on the inside of the elbow, on the back of hand, or in a vein in the foot or lower leg.    The most common, though still rare, potential reaction to an intravenous contrast injection is an allergic-like reaction. Most allergic-like reactions are mild, though a small subset of people can have more severe reactions. Mild reactions include mild / scattered hives, itching, scratchy throat, sneezing and nasal congestion. More severe reactions include facial swelling, severe difficulty breathing, wheezing and anaphylactic shock. Those with a prior history allergic-like reaction to the same class of contrast media (such as CT contrast or MRI contrast) are at the highest risk for an allergic reaction.     If you believe you had an allergic reaction to contrast in the past, please let our staff know. We can determine if this increases your risk for a future reaction and provide steps to decrease the risk. This may delay your examination, but it decreases the risk of having a new and possibly more severe reaction to the contrast injection.    People with a history of prior allergic reactions to other substances (such as unrelated medications and food) and patients with a history of asthma have slightly increased risk for an allergic reaction from contrast " material when compared with the general population, but do not require to be pretreated prior to a contrast injection.    You should notify the physician, nurse or technologist if you have ever had any of these conditions or if you have any questions.

## 2024-08-16 NOTE — PLAN OF CARE
Problem: Adult Inpatient Plan of Care  Goal: Plan of Care Review  Outcome: Progressing  Flowsheets (Taken 8/16/2024 1014)  Progress: improving  Outcome Evaluation: PT eval completed. Pt all functional mobility Min A x2 no AD. Rec Home w/ 24/7HHA.  Plan of Care Reviewed With: patient     Problem: Mobility Impairment  Goal: Optimal Mobility  Outcome: Progressing

## 2024-08-16 NOTE — PROGRESS NOTES
Occupational Therapy -  Initial Evaluation     Patient: Arielle Felix  Location: [facility] [address] [facility]  MRN: [id number]  Today's date: 8/16/2024    HISTORY OF PRESENT ILLNESS     Arielle is a 63 y.o. female admitted on 8/8/2024 with Lung nodule [R91.1]  Thoracic lymphadenopathy [R59.0]  Pulmonary edema, acute (CMS/HCC) [J81.0]. Principal problem is Pulmonary edema, acute (CMS/HCC).    Past Medical History  Arielle has a past medical history of At risk for falls, De Quervain's tenosynovitis, Essential (primary) hypertension, Follicular carcinoma of thyroid (CMS/HCC), Gastro-esophageal reflux, Hand ulceration (CMS/HCC), Hyperlipidemia, unspecified, Interstitial lung disease (CMS/HCC), Leg ulcer (CMS/HCC), Lung nodule, Lupus (CMS/HCC), O2 dependent, Osteomyelitis of hand (CMS/HCC), Osteoporosis, Pulmonary hypertension (CMS/HCC), PVD (peripheral vascular disease) (CMS/HCC), Raynaud's disease, Reflux esophagitis, Scleroderma (CMS/HCC), Screening mammogram for breast cancer (05/04/2021), Vertigo, and Wound, open, foot.    History of Present Illness   Ms Felix is a 63 y.o. 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 (+) intractable bony chest    Admitted following lung biopsy in the setting of pulmonary edema.         Additionally, post-procedure she was found to have intractably bony chest pain which has not been responsive to tylenol.      8/9 podiatry consulted for  necrosis of the left second toe along with other chronic BLE wounds. WBAT to BLE      Late 8/10 noted right eye pain,, swelling, blurry vision, ophthalmology consulted; CTH neg for acute intracranial abnormalities  -improving with lubricant drops, r eye also improving.        8/10 BLE arterial US: indings concerning for  "hemodynamically significant stenosis within bilat popliteal arteries and distal left femoral artery\"     8/11 Ophthalmology was consulted for concerns of right eye swelling and blurry vision.     8/14:  S/p LLE angio, balloon angio of distal SFA, TP trunk, DCB angio of popliteal artery on 8/14.   Per general surgery note on 8/15 OOB/light walking as tolerated   PRIOR LEVEL OF FUNCTION AND LIVING ENVIRONMENT     Prior Level of Function      Flowsheet Row Most Recent Value   Dominant Hand right   Ambulation assistive person   Transferring assistive person   Toileting assistive person   Bathing assistive equipment and person   Dressing assistive person   Eating independent   IADLs assistive person   Driving/Transportation friends/family   Communication understands/communicates without difficulty   Prior Level of Function Comment Pt reports amb with A from caregivers or furniture surfing, A for ADLs/IADLs. Pt wears 2L O2 at home, and has 24/7 caregivers.   Assistive Device Currently Used at Home wheelchair, oxygen, stair glide             Prior Living Environment      Flowsheet Row Most Recent Value   People in Home alone   Current Living Arrangements home   Living Environment Comment Pt lives in 2SH with 5-6 LARRY, FF to b/b with stairglide, with tub/shower set up.            VITALS AND PAIN     OT Vitals      Date/Time Pulse HR Source SpO2 Pt Activity O2 Therapy O2 Del Method O2 Flow Rate BP BP Location BP Method Pt Position [facility]   08/16/24 0955 98 Monitor 97 % At rest Supplemental oxygen Nasal cannula 2 L/min 124/60 Right upper arm Automatic Sitting CP          OT Pain      Date/Time Pain Type Side/Orientation Rating: Rest Rating: Activity Interventions [facility]   08/16/24 0955 Pain Assessment abdomen 10 - excruciating pain 10 - excruciating pain care clustered CP             Objective   OBJECTIVE     Start time:  0954  End time:  1009  Session Length: 15 min  Mode of Treatment: co-treatment  Reason for co-treatment: " maximize safety in functional tasks    General Observations  Patient received upright, in bed. She was agreeable to therapy, no issues or concerns identified by nurse prior to session. RN cleared pt for tx    Precautions: fall              OT Eval and Treat - 08/16/24 0954          Cognition    Orientation Status oriented x 3     Affect/Mental Status WFL;emotionally labile     Behavioral Issues difficulty managing stress;overwhelmed easily     Follows Commands follows one-step commands;over 90% accuracy;increased processing time needed;repetition of directions required     Cognitive Function safety deficit     Safety Deficit awareness of need for assistance;minimal deficit     Comment, Cognition pleasent and cooperative despite being very sick able to follow with cues;        Vision Assessment/Intervention    Vision Assessment WFL        Hearing Assessment    Hearing Status WFL        Sensory Assessment    Sensory Assessment sensation intact, upper extremities        Upper Extremity Assessment    UE Assessment ROM and Strength WFL     General Observations missing fingers very neaseated during session limiting full assessment        Lower Extremity Assessment    LE Assessment ROM and Strength WFL        Bed Mobility    Comment recieved sitting EOB        Mobility Belt    Mobility Belt Used During Session no - other (see comments)     Reason Mobility Belt Not Used pt with 10/10 abdominal pain        Sit/Stand Transfer    Surface edge of bed     [place] minimum assist (75% or more patient effort);2 person assist     Safety/Cues increased time to complete;sequencing     Assistive Device none     Transfer Comments opted for HHA due to limitation in pt hands steady upon standing tearful from the pain throughout session        Functional Mobility    Distance few steps at bedside     Functional Mobility [place] minimum assist (75% or more patient effort);2 person assist     Safety/Cues increased time to complete      Assistive Device none     Functional Mobility Comments opted for HHA during transfer able to take short steps to recliner chair with MinAx2 needed        Balance    Static Sitting Balance WFL     Dynamic Sitting Balance WFL     Sit to Stand Dynamic Balance mild impairment;supported     Static Standing Balance mild impairment;supported     Dynamic Standing Balance mild impairment;supported     Comment, Balance lateral support very tearful throughout        Impairments/Safety Issues    Impairments Affecting Function balance;endurance/activity tolerance;pain;postural/trunk control;strength     Functional Endurance Fair; below baseline function                                    Education Documentation  Treatment Plan, taught by Mandy Hammond OT at 8/16/2024  2:24 PM.  Learner: Patient  Readiness: Acceptance  Method: Explanation  Response: Verbalizes Understanding  Comment: edu on OT role and recommendations        Session Outcome  Patient upright, in chair at end of session, chair alarm on, call light in reach, personal items in reach, all needs met. Nursing notified about patient's performance, change in vital signs, and patient's response to therapy/activity.    AM-PAC™ - ADL (Current Function)     Putting on/taking off regular lower body clothing 3 - A Little   Bathing 3 - A Little   Toileting 3 - A Little   Putting on/taking off regular upper body clothing 3 - A Little   Help for taking care of personal grooming 3 - A Little   Eating meals 4 - None   AM-PAC™ ADL Score 19      ASSESSMENT AND PLAN     Progress Summary  OT IE completed at this time. Pt recieved sitting EOB agreeable to transfer. She reported intense abdominal pain and neasea so limited functional abilities. Pt performed STS from EOB with MinAx2; she became very tearful during session due to pain emphasthic listening given. Pt has baseline 24/7 caretakers OT to recommend back home with continued A when medically stable. OT to  follow.    Patient/Family Therapy Goal Statement: to feel better    OT Plan      Flowsheet Row Most Recent Value   Rehab Potential good, to achieve stated therapy goals at 08/16/2024 0954   Therapy Frequency 4 times/wk at 08/16/2024 0954   Planned Therapy Interventions activity tolerance training, occupation/activity based interventions, patient/caregiver education/training, strengthening exercise, transfer/mobility retraining, adaptive equipment training, BADL retraining, functional balance retraining, neuromuscular control/coordination retraining, passive ROM/stretching, ROM/therapeutic exercise at 08/16/2024 0954            OT Discharge Recommendations      Flowsheet Row Most Recent Value   OT Recommended Discharge Disposition home with assistance, home with home health at 08/16/2024 0954   Anticipated Equipment Needs if Discharged Home (OT) none at 08/16/2024 0954                 OT Goals      Flowsheet Row Most Recent Value   Bed Mobility Goal 1    Activity/Assistive Device bed mobility activities, all at 08/16/2024 0954   [place] modified independence at 08/16/2024 0954   Time Frame by discharge at 08/16/2024 0954   Progress/Outcome goal ongoing at 08/16/2024 0954   Transfer Goal 1    Activity/Assistive Device all transfers at 08/16/2024 0954   [place] modified independence at 08/16/2024 0954   Time Frame by discharge at 08/16/2024 0954   Progress/Outcome goal ongoing at 08/16/2024 0954   Dressing Goal 1    Activity/Adaptive Equipment dressing skills, all at 08/16/2024 0954   [place] modified independence at 08/16/2024 0954   Time Frame by discharge at 08/16/2024 0954   Progress/Outcome goal ongoing at 08/16/2024 0954   Toileting Goal 1    Activity/Assistive Device toileting skills, all at 08/16/2024 0954   [place] modified independence at 08/16/2024 0954   Time Frame by discharge at 08/16/2024 0954   Progress/Outcome goal ongoing at 08/16/2024 0954

## 2024-08-16 NOTE — PROGRESS NOTES
Hospital Medicine     Daily Progress Note       SUBJECTIVE   Interval History: pt w/ ongoing abdominal pain.  Noted RP hematoma on imaging yesterday.  Given excruciating pain offered trial of fentanyl (as bridge to patch) which she declined.      OBJECTIVE      Vital signs in last 24 hours:  Temp:  [36.5 °C (97.7 °F)-36.8 °C (98.2 °F)] 36.6 °C (97.8 °F)  Heart Rate:  [] 77  Resp:  [16] 16  BP: (103-124)/(51-60) 114/56    Intake/Output Summary (Last 24 hours) at 8/16/2024 1620  Last data filed at 8/16/2024 1400  Gross per 24 hour   Intake 820 ml   Output 0 ml   Net 820 ml       PHYSICAL EXAMINATION      Physical Exam  Vitals and nursing note reviewed.   Constitutional:       General: She is not in acute distress.     Comments: Chronically ill-appearing, underweight   HENT:      Head: Normocephalic. Erythematous, taught skin  Eyes:      Conjunctiva/sclera: Conjunctivae normal.   Cardiovascular:      Rate and Rhythm: Normal rate and regular rhythm.   Pulmonary:      Effort: Pulmonary effort is normal. No respiratory distress.      Breath sounds: Normal breath sounds.   Abdominal:      Unable to palpate abd grimaces to even slight touch.   Musculoskeletal: Hand deformities-chronic     Cervical back: Neck supple.      Right lower leg: No edema.      Left lower leg: No edema.   Skin:     General: Skin is warm and dry.   Neurological:      Mental Status: She is alert and oriented to person, place, and time.   Psychiatric:         Behavior: Behavior normal.           LINES, CATHETERS, DRAINS, AIRWAYS, AND WOUNDS   Lines, Drains, and Airways:  Wounds (agree with documentation and present on admission):  Peripheral IV (Adult) 08/08/24 Anterior;Left;Proximal Forearm (Active)   Number of days: 4       Wound Anterior;Right Toe (2nd) (Active)   Number of days: 4       Wound Anterior;Left Toe (Great) (Active)   Number of days: 4       Wound Anterior;Left Toe (2nd) (Active)   Number of days: 4         Comments:    LABS /  IMAGING / TELE      Labs  Results from last 7 days   Lab Units 08/16/24  0630   SODIUM mEQ/L 140   POTASSIUM mEQ/L 4.4   CHLORIDE mEQ/L 105   CO2 mEQ/L 29   BUN mg/dL 45*   CREATININE mg/dL 1.4*   GLUCOSE mg/dL 83   CALCIUM mg/dL 8.8     Results from last 7 days   Lab Units 08/16/24  1458   WBC K/uL 14.59*   HEMOGLOBIN g/dL 8.1*   HEMATOCRIT % 27.7*   PLATELETS K/uL 185           Imaging  No new images today      ECG/Telemetry  Reviewed    ASSESSMENT AND PLAN      Retroperitoneal hematoma  Assessment & Plan  Pt endorsing abdominal pain yesterday morning that has progressed since then with inability to tolerate PO.  Has had hg drop, CTAP w/o con suggestive of RP hematoma with some mass effect on bladder.  Today w/ worsening pain now extending to R flank  -vascular on board   -hold plavix for now given hg drop until active bleed ro  -CTAP w. Contrast ordered   -pre meds given   -closely monitor for reaction given allergy to contrast  -Q8 CBC, keep TS active    Wound of lower extremity  Assessment & Plan  bilateral lower extremity wounds and bilateral second digit dry gangrene  Podiatry consult appreciated  Advised PVR, arterial US; Ultrasound ENRRIQUE with various deficiencies, but seems worse in the left PT at 0.62.  Arterial ultrasound with hemodynamically significant stenosis in the bilateral popliteal arteries and distal fem  Vascular surgery consult appreciated  s/p LLE angio, balloon angio of distal SFA, TP trunk, DCB angio of popliteal artery on 8/14  Now on Plavix 75mg daily. She has an allergy to aspirin.  Local wound care as per pods    Interstitial lung disease (CMS/HCC)  Assessment & Plan  DPLD  Does not appear to be in exacerbation for this  Has not tolerated trials of Mycophenolate. On Tocilizumab.   On 2L O2-NC chronically    Abdominal pain  Assessment & Plan  Pt reporting lower quadrant abdominal pian this AM along with 2 episodes of vomiting  Unclear etiology, possibly referred pain from her R groin site.  Her R groin site looks good with no hematoma.  She is moving her bowels  Unclear etiology at this time  Given her newly elevated WBC count today, will check CTAP  Check UA, hepatic function panel, Lipase  Pt declines starting any new medications for pain    Pain of both eyes  Assessment & Plan  Late 8/10 noted right eye pain,, swelling, blurry vision  Seen by ophthalmology  No concern for preseptal or orbital cellulitis  CT orbits: No evidence of acute process involving the orbits  Carotid US negative for HD significant stenosis  Advised various drops, ointments for her eyes  Advised stopping Lyrica  Both blurred vision and eye pain have resolved    Chest pain  Assessment & Plan  Chest pain has been ongoing for months, chronic at this point  Exam and history suggest MSK  Pain control is difficult as patient has various allergies, including morphine, oxycodone, NSAIDs.  She has a lidocaine patch, she does not think it is helping much  Consulted pain management; Advised addition of Lyrica, Flexeril, tramadol  Currently, ophthalmology recommended cessation of Lyrica given concerns of it causing her blurry vision/dry eyes  Patient is very apprehensive to try any new medications, she could not tolerate tramadol  Pall on board, pt has tolerated fentanyl in the passed offered today but declined    Pulmonary nodule  Assessment & Plan  Left lower lobe nodule, s/p ION navigational bronchoscopy with biopsy showing a small focus of atypical glands.     -Pulm input appreciated  -outpatient follow up with pulmonary regarding biopsy findings    Acute on chronic heart failure with preserved ejection fraction (CMS/HCC)  Assessment & Plan  EF 65%  Known to Cardiology Dr. Arvizu, in-house consult placed  S/p IV ethacrynic acid, now back on PO  Continue pHTN meds; opsumit, revatio  CHF order set, daily weights, salt/fluid restriction, strict Is&Os      Scleroderma (CMS/HCC)  Assessment & Plan  Again, chronic  Tocilizumab every 28  days    Pulmonary hypertension (CMS/HCC)  Assessment & Plan  Chronic, extensive history    Continue home med macitentan  On sildenafil  Diuretic held given c/f for bleed, if were to require transfusion will give with IV Diuretic to prevent overload    Essential (primary) hypertension  Assessment & Plan  Pressure stable here, continue home medications         VTE Assessment: Padua    VTE Prophylaxis:  Current anticoagulants:    None      Code Status: Full Code      Estimated Discharge Date: 8/19/2024       Disposition Planning: pending clinical course     Simon Cano,   8/16/2024                Include Location In Plan?: Yes [place] Additional Notes?: No Detail Level: Detailed

## 2024-08-16 NOTE — PROGRESS NOTES
Cardiology/pulmonary hypertension service progress note    Interval History:    Awake in bed, continues to report some nausea.  Notes she has not vomited today.  Was able to eat some oatmeal.  Having a lot of right groin pain.  CT noted right inguinal hematoma, also noted HGB/plt drop. Plavix being held. Continues with Dyspnea on exertion. Standing weight up to 114 lbs (prior 111 lbs), dry weight around 109 lbs. Diuretic being held as creat increased from 1.0 to 1.4. Still has pain in the foot, no noticeable change since intervention.    Background per Dr. Arvizu:    Ms. Felix is a 63 y.o. female with a past medical history of Pulmonary HTN, HTN, Raynaud's Disease, Cutaneous Systemic Scleroderma (dx 1998), ILD, PE (3/2023). She is a patient of Dr. Nguyễn. She was previously followed by Dr. Jos Valdez at [facility]. Echocardiogram from 4/21/2022 showed LVEF: 55-60%, mild aortic valve thickening, pulmonary artery systolic pressure: 52 mmHg and trivial pericardial effusion. LHC and RHC (separate occasions) over the last 5-10 years which showed normal hemodynamics and normal coronaries. She had a RHC 9/02/2020 showing top-normal right sided filling pressures with mild pulmonary hypertension, top-normal PVR and normal pulmonary capillary wedge pressure. The cardiac index was normal, seen below.     On 6/27/2022, she was in [facility] for chest pain. EKG: NSR without ischemic changes. hsTrop: 12->16, d-dimer: 0.56, CXR showed cardiomegaly and diffuse interstitial prominence.     Dr. Nguyễn ordered echocardiogram, which was completed on 8/22/2022 and showed estimated RVSP = 50-55 mmHg, normal-sized LV with normal LV systolic function. Estimated EF 55- 60%. Wall motion grossly normal, mild concentric left ventricular hypertrophy, grade I LV diastolic dysfunction, probably normal RV size and function.    At her initial visit on 10/11/2022, she reported that she felt very short of breath with minimal activity most of the  time. She reports that this started about 1 year prior and had progressively gotten worse. She described PND and bendopnea. She reported that she experienced ankle swelling, worsened over the past year and usually worse towards the end of the night. She also reported some swelling in her abdomen. She reported daily palpations. I recommended a RHC which was done 11/28/2022 and showed: Normal right sided filling pressures. Mildly elevated left sided filling pressures. Precapillary pulmonary hypertension with mean PA 36 mmHg.    She was hospitalized on 3/19/2023 at [facility] for PE diagnosed on V/Q scan. She reports that she had presented with left arm pain and heaviness. She states that she was started on Apixaban. One week after her last office visit (4/6/2023), she presented to [facility] with chest pain, epistaxis and hemoptysis. Echocardiogram showed: left ventricular cavity size normal with mild left ventricular hypertrophy and preserved systolic function. Estimated EF 65%. Chest CTA showed no evidence of PE; Apixaban was discontinued.     She had a repeat echocardiogram (6/2023) which showed: Normal left ventricular cavity size and mild concentric left ventricular hypertrophy. Normal systolic function. EF: 60%. Normal right ventricular structure and function. Mild tricuspid valve regurgitation with estimated RVSP: 55 mmHg. Compared to previous study from 4/14/2023, estimated right ventricular systolic pressure were higher.    She underwent V/Q scan in September 2023, which showed intermediate-to-high probability of pulmonary embolic disease, as a result she completed CTA Chest PE which showed: no evidence for filling defect or vessel cutoff to suggest pulmonary embolism. Enlargement of the pulmonary arteries compatible with pulmonary arterial hypertension was seen.    She reports she started Macitentan around August or September 2023. She stated that she had not been checking her SpO2. She reports she  did not notice any difference since starting it. She reported she had been feeling more shortness of breath at times with ADLs.     She presented to [facility] on 3/3/2024 with chest pressure, palpitations and nausea. She was noted to have hypoxia and tachypnea at presentation. She was found to have moderate-to-large pericardial effusion without tamponade. She was started on Colchicine 0.6 mg BID on 3/4/2024 and when she did not improve symptomatically, Prednisone 20 mg daily was added on 3/5/2024. On 3/7/2024, she developed multiple episodes of diarrhea and Colchicine dose was decreased to 0.3 mg. Coronary CTA, done as part of the ischemic workup, showed moderate 2 vessel CAD. She was recommended to begin Atorvastatin 20 mg daily and Clopidogrel 75 mg daily. She initially refused those two medications, but eventually agreed to take Atorvastatin.      She was discharged on Colchicine 0.3 mg for at least 3 months and Prednisone 20 mg for a total two week course until 3/19/2024, then decrease the dose to 10 mg daily for 2 weeks until 4/2/2024, then decrease dose to 5 mg daily for 2 weeks until 4/16/2024. She reports she tapered off Prednisone as instructed and has continued to take Colchicine as prescribed. She completed an echocardiogram (4/10/2024) which showed: Small-to-moderate sized, circumferential pericardial effusion. No echocardiographic evidence for cardiac tamponade. Compared to previous study from 3/4/2024, no significant change. Pericardial effusion size was similar, estimated right atrial pressure lower.      She presented to [facility] (4/16/2024) with increased pleuritic chest pain. On presentation to the ER, she was noted to be hypertensive and tachycardic. Labs showed mildly elevated troponin, elevated BNP and leukocytosis. CT angiography of the chest did not show evidence of PE. It was read as large pericardial effusion, evidence of emphysema, evidence of pulmonary hypertension, 16 mm left lobe lung mass.      "  She presented again to [facility] on 5/21/2024 at the request of her Rheumatologist. During her visit there, she was noted to be tachycardic and with some shortness of breath. Her chest pain was improved from her prior admission. On arrival, O2 saturations were normal. ECG showed: Sinus Tachycardia (HR: 125 bpm). BNP >300, hsTrop 32 ->24. POCUS in the emergency department showed evidence of pericardial effusion with no tamponade physiology. CTA Chest did not show evidence of pulmonary embolism. There were bilateral changes radiographically concerning for volume overload. She received Methylpredinsolone 125 mg in ED. Of note, she was on Prednisone 10 mg daily at home. She was given IV diuresis. She was discharged on Furosemide 20 mg PO PRN daily for fluid weight gain / swelling.     She had brief hospitalization and was discharged on 7/23 for chest pain. She presented to the ED on 7/25/2024 for worsening of her chronic chest pain and 8/5/2024 for facial swelling and chronic chest pain.      She underwent bronchoscopic lung biopsy yesterday, 8/8/2024, with Dr. Niles Rodriguez. Pathology showed atypical glandular cells which may be consistent with adenocarcinoma in situ.     Post-procedure CXR showed increase pulmonary vascular congestion. She was admitted for optimization.       ---    Rheumatology: Dr. Trevizo  Pulmonology: Dr. Wasserman    Past Medical / Surgical History:  Pulmonary HTN, HTN, Raynaud's Disease, Cutaneous Systemic Scleroderma (dx 1998), ILD, PE (3/2023), Follicular Carcinoma of Thyroid, Tenosynovitis, de Quervain, h/o Hernia Repair, h/o Appendicitis, h/o Digit Amputation, H/o Total Thyroidectomy (Dr. Pacheco at [facility]"; 4/2013)    Family & Social History: She is , she has two children. She has worked as an .     Tobacco: former 2005  EtOH: never   Illicits: never     Her brother has CAD with stent  1st cousin with SLE  Both parents had \"heart problems\"    Allergies:   Allergies   Allergen Reactions " "   Aspirin Shortness of breath     Other reaction(s): Unknown  \"stop breathing\"      Ibuprofen Shortness of breath     Stop breathing    Iodine Shortness of breath     Other reaction(s): Unknown    Iodine And Iodide Containing Products Shortness of breath     ? rash    Morphine Shortness of breath      Reported wamrth of face, improved with benadryl and cold compresses after getting morphine as well as episode of (subjective ) dyspnea, and thought she passed out - did have wamrth and erythema of face with mild edema R>L cheek notably on exam.     Penicillins Shortness of breath     Other reaction(s): Unknown    Sulfa (Sulfonamide Antibiotics) Angioedema    Clindamycin     Hydrochlorothiazide      Other reaction(s): Abdominal Pain   Slow heart rate    Oxycodone      Other reaction(s): Vomiting    Sulfamethoxazole-Trimethoprim      Other reaction(s): Vomiting, Weakness     Latex Rash       Medications:   Current Facility-Administered Medications   Medication Dose Route Frequency Provider Last Rate Last Admin    acetaminophen (TYLENOL) tablet 975 mg  975 mg oral q6h [facility] Simon Cano DO   975 mg at 08/16/24 0447    amLODIPine (NORVASC) tablet 10 mg  10 mg oral q AM Simon Cano DO   10 mg at 08/16/24 0813    artificial tears (dextran-hpm-glyc) (GENTEAL TEARS) 0.1-0.3-0.2 % eye drops 1 drop  1 drop Both Eyes QID Simon Cano DO   1 drop at 08/15/24 1533    atorvastatin (LIPITOR) tablet 40 mg  40 mg oral Daily (6p) Simon Cano DO   40 mg at 08/14/24 1654    benzocaine-menthol (CEPACOL/CHLORASEPTIC) lozenge 1 lozenge  1 lozenge Mouth/Throat q2h PRN Simon Cano DO   1 lozenge at 08/16/24 0813    benzonatate (TESSALON) capsule 100 mg  100 mg oral 3x daily PRN Simon Cano DO   100 mg at 08/15/24 0804    carboxymethylcellulose (REFRESH PLUS) 0.5 % ophthalmic dropperette 1 drop  1 drop Right Eye 3x daily PRN Simon Cano, " DO        [Provider Managed Hold] clopidogreL (PLAVIX) tablet 75 mg  75 mg oral Daily Simon Cano DO   75 mg at 08/15/24 0803    cyanocobalamin (VITAMIN B12) tablet 1,000 mcg  1,000 mcg oral Daily Simon Cano, DO   1,000 mcg at 08/16/24 0813    cyclobenzaprine (FLEXERIL) tablet 5 mg  5 mg oral 3x daily PRN Simon Cano, DO   5 mg at 08/16/24 0447    glucose chewable tablet 16-32 g of dextrose  16-32 g of dextrose oral PRN Simon Cano DO        Or    dextrose 40 % oral gel 15-30 g of dextrose  15-30 g of dextrose oral PRN Simon Cano DO        Or    glucagon (GLUCAGEN) injection 1 mg  1 mg intramuscular PRN Simon Cano DO        Or    dextrose 50 % in water (D50) injection 12.5 g  25 mL intravenous PRN Simon Cano DO        docusate sodium (COLACE) capsule 100 mg  100 mg oral Daily PRN Simon Cano DO        [Provider Managed Hold] ethacrynic acid (EDECRIN) tablet 50 mg  50 mg oral Daily Simon Cano DO   50 mg at 08/15/24 0805    honey (MEDIHONEY) 100 % topical paste   Topical Daily PRN Simon Cano DO        levothyroxine (SYNTHROID) tablet 100 mcg  100 mcg oral Daily (6:30a) Simon Cano DO   100 mcg at 08/16/24 0549    lidocaine (ASPERCREME) 4 % topical patch 1 patch  1 patch Topical Daily Simon Cano, DO   1 patch at 08/16/24 0814    lidocaine (ASPERCREME) 4 % topical patch 1 patch  1 patch Topical Daily Simon Cano, DO   1 patch at 08/16/24 0814    lidocaine (ASPERCREME) 4 % topical patch 1 patch  1 patch Topical Daily Simon Cano, DO   1 patch at 08/16/24 0814    macitentan (OPSUMIT) tablet 10 mg (Patient Owned)  10 mg oral Daily Simon Cano DO   10 mg at 08/16/24 0812    melatonin ODT 3 mg  3 mg oral Nightly PRN Simon Cano DO   3 mg at 08/15/24 2116    ondansetron  ODT (ZOFRAN-ODT) disintegrating tablet 4 mg  4 mg oral q8h PRN Simon Cano DO   4 mg at 08/15/24 2116    Or    ondansetron (ZOFRAN) injection 4 mg  4 mg intravenous q8h PRN Simon Cano DO   4 mg at 08/15/24 1206    pantoprazole (PROTONIX) injection 40 mg  40 mg intravenous q12h [facility] Simon Cano DO   40 mg at 08/16/24 0813    [Provider Managed Hold] pantoprazole (PROTONIX) tablet,delayed release (DR/EC) 20 mg  20 mg oral Daily Simon Cano DO   20 mg at 08/15/24 0804    peg 400-hypromellose-glycerin (ARTIFICIAL TEARS) 1-0.2-0.2 % eye drops 1 drop  1 drop Both Eyes q4h while awake Simon Cano DO   1 drop at 08/15/24 1533    phenoL (SORE THROAT SPRAY) 1.4 % mucosal spray 1 spray  1 spray Mouth/Throat q2h PRN Simon Cano DO        polyethylene glycol (MIRALAX) 17 gram packet 17 g  17 g oral BID Simon Cano DO   17 g at 08/11/24 2126    polyethylene glycol (MIRALAX) 17 gram packet 17 g  17 g oral 2x daily PRN Simon Cano DO        senna (SENOKOT) tablet 2 tablet  2 tablet oral BID Simon Cano DO   2 tablet at 08/11/24 2127    sildenafiL (pulm.hypertension) (REVATIO) tablet 20 mg  20 mg oral TID Simon Cano DO   20 mg at 08/16/24 0813    sodium chloride (OCEAN) 0.65 % nasal spray 2 spray  2 spray Each Nostril Daily Simon Cano DO   2 spray at 08/16/24 0817    white petrolatum (AQUAPHOR) ointment   Topical q4h PRN Simon Cano DO   Given at 08/13/24 2117    white petrolatum (AQUAPHOR) ointment   Topical BID Simon Cano,    Given at 08/16/24 0816    white petrolatum-mineral oil (ARTIFICIAL TEARS OINT) ophthalmic ointment   Both Eyes Nightly Simon Cano DO   Given at 08/15/24 2120       Review of Systems:   All other systems reviewed and are negative except as per HPI.    Physical Exam:     Wt Readings from  Last 10 Encounters:   08/16/24 52.1 kg (114 lb 12.8 oz)   08/05/24 49.4 kg (109 lb)   08/05/24 49.4 kg (109 lb)   07/20/24 49.5 kg (109 lb 2 oz)   07/19/24 51.3 kg (113 lb)   06/20/24 51.7 kg (114 lb)   06/14/24 52.6 kg (116 lb)   05/23/24 52.4 kg (115 lb 9.6 oz)   04/17/24 53.5 kg (118 lb)   04/10/24 44.5 kg (98 lb)       BP Readings from Last 5 Encounters:   08/16/24 (!) 122/58   08/05/24 (!) 148/75   07/25/24 (!) 155/77   07/23/24 (!) 101/57   07/19/24 (!) 154/80       Vitals:    08/16/24 0748   BP: (!) 122/58   Pulse: 85   Resp:    Temp: 36.6 °C (97.8 °F)   SpO2: 100%       Constitutional: NAD, AAOx3  HENT: Normocephalic, atraumatic head, sclerae anicteric, no cervical lymphadenopathy, trachea midline, facial swelling  Cardiovascular: RRR, no murmurs, rubs or gallops, JVP: 10 cm H2O   cm H2O, no carotid bruits.  Respiratory: CTA bilateral lung fields, no wheezes, rales or rhonchi  GI: soft, non-tender/non-distended  Musculoskeletal / Extremities: Bilateral hip edema noted, +2 pulses bilateral radial, DP/PT arteries, skin tightening on face and fingertips, ulceration and discoloration of 2nd phalange b/l LE.   Skin: no rashes. Facial changes c/w scleroderma  Neuro / Psych: no focal deficits, CNII-XII grossly intact, appropriate, cooperative    Diagnostic Data:     Component      Latest Ref Rng 7/20/2024 7/25/2024   Sodium      136 - 145 mEQ/L 142  139    Potassium, Bld      3.5 - 5.1 mEQ/L 4.2  4.3    Chloride      98 - 107 mEQ/L 107  103    CO2      21 - 31 mEQ/L 25  27    BUN      7 - 25 mg/dL 17  17    Creatinine      0.6 - 1.2 mg/dL 1.2  1.1    Glucose      70 - 99 mg/dL 85  86    Calcium      8.6 - 10.3 mg/dL 9.3  10.6 (H)    AST (SGOT)      13 - 39 IU/L  15    ALT (SGPT)      7 - 52 IU/L  8    Alkaline Phosphatase      34 - 125 IU/L  82    Total Protein      6.0 - 8.2 g/dL  8.8 (H)    Albumin      3.5 - 5.7 g/dL  4.3    Bilirubin, Total      0.3 - 1.2 mg/dL  0.4    eGFR      >=60.0 mL/min/1.73m*2 51.0 (L)   56.6 (L)    Anion Gap      3 - 15 mEQ/L 10  9       Component      Latest Ref Rn 7/20/2024 7/25/2024   WBC      3.80 - 10.50 K/uL 7.27  10.29    Hemoglobin      11.8 - 15.7 g/dL 9.8 (L)  11.2 (L)    Hematocrit      35.0 - 45.0 % 32.0 (L)  37.3    MCV      83.0 - 98.0 fL 80.4 (L)  80.4 (L)    Platelets      150 - 369 K/uL 206  331       Component      Latest Ref Rn 7/19/2024 7/25/2024 8/9/2024   High Sens Troponin I      <15.0 pg/mL 89.2 (HH)  66.8 (HH)  92.5 (HH)    High Sens Troponin I       90.3 (HH)  83.4 (HH)         Component      Latest Ref [facility] 5/22/2024 6/22/2024 7/19/2024 7/25/2024   BNP      <=100 pg/mL 352 (H)  150 (H)  151 (H)  220 (H)       Component      Latest Ref [facility] 4/16/2024 5/23/2024 7/21/2024 7/22/2024   Sed Rate      0 - 30 mm/hr 101 (H)  79 (H)  105 (H)  >119 (H)       Component      Latest Ref [facility] 4/18/2024 5/24/2024   WBC      3.80 - 10.50 K/uL 8.40  16.22 (H)    RBC      3.93 - 5.22 M/uL 3.54 (L)  3.82 (L)    Hemoglobin      11.8 - 15.7 g/dL 9.8 (L)  10.3 (L)    Hematocrit      35.0 - 45.0 % 32.8 (L)  33.9 (L)    Platelets      150 - 369 K/uL 254  289       Component      Latest Ref [facility] 5/24/2024   Magnesium      1.8 - 2.5 mg/dL 2.1      Component      Latest Ref [facility] 4/13/2023 3/7/2024 5/22/2024   TSH      0.34 - 5.60 mIU/L 4.71  0.28 (L)  1.03      Component      Latest Ref [facility] 5/24/2024   Sodium      136 - 145 mEQ/L 138    Potassium, Bld      3.5 - 5.1 mEQ/L 4.4    Chloride      98 - 107 mEQ/L 104    CO2      21 - 31 mEQ/L 24    BUN      7 - 25 mg/dL 40 (H)    Creatinine      0.6 - 1.2 mg/dL 1.1    Glucose      70 - 99 mg/dL 87    Calcium      8.6 - 10.3 mg/dL 9.5    eGFR      >=60.0 mL/min/1.73m*2 56.6 (L)    Anion Gap      3 - 15 mEQ/L 10      Component      Latest Ref Rng 4/13/2023   Hemoglobin A1C      <5.7 % 4.4        Component      Latest Ref Rng 4/14/2023   Triglycerides      30 - 149 mg/dL 44    Cholesterol      <=200 mg/dL 194    HDL      >=55 mg/dL 49 (L)    LDL Calculated       <=100 mg/dL 136 (H)    Non-HDL, Calculated      mg/dL 145    RISK      <=5.0  4.0       Component      Latest Ref Rng 4/13/2023   High Sens Troponin I      <15.0 pg/mL 23.0 (H)       Component      Latest Ref Rng 4/14/2023   Ferritin      11 - 250 ng/mL 75      Component      Latest Ref Rng 4/14/2023   Iron      35 - 150 ug/dL 29 (L)    TIBC      270 - 460 ug/dL 245 (L)    UIBC      180 - 360 ug/dL 216    Iron Saturation      15 - 45 % 12 (L)        Component      Latest Ref Rng 6/27/2022 4/13/2023   BNP      <=100 pg/mL 111 (H)  105 (H)                          ---    ECG (8/2/2024, personally reviewed):   Sinus tachycardia   Left ventricular hypertrophy with repolarization abnormality    HR: 117 bpm     ---        Lower extremity arterial duplex and NERRIQUE August 10, 2024:  Right ankle ENRRIQUE (DP): 0.74.  Right ankle ENRRIQUE (PT): 0.88.  Left ankle ENRRIQUE (DP): 0.90.  Left ankle ENRRIQUE (PT): 0.62.    Findings concerning for hemodynamically significant stenosis within the  bilateral popliteal arteries and distal left femoral artery. There are also  findings which can be seen with inflow stenosis at the level left common femoral  artery. CTA of the abdomen and pelvis with lower extremity runoff could be  performed for further evaluation if clinically indicated.    CT Chest / Abdomen / Pelvis w/ Contrast (7/25/2024, personally reviewed):     Lung bases: Left-sided pleural effusion, left parenchymal consolidation, and  findings of interstitial lung disease.  More nodular density in the medial  aspect of the left lower lobe measuring 1.6 x 1.9 cm.  Cardiomegaly with a  pericardial effusion.     Liver: The liver is normal in size.  There is no focal mass.  Hepatic veins and  portal veins are patent without focal filling defects to suggest thrombosis..     Gallbladder:  Small dependent gallstones.  No gallbladder wall thickening..     Spleen: Spleen is normal in size without focal mass lesion..     Pancreas: The pancreas is normal  without focal mass, parenchymal atrophy, or  pancreatic duct dilation..     Adrenals: The adrenals are normal bilaterally without focal mass..     Kidneys, ureters and bladder:  Symmetric enhancement and excretion..  Left lower  pole cystic lesion measures 3.2 cm in maximal dimension.  This measures greater  than simple fluid density.  This measured 60 Hounsfield units on the noncontrast  CT from 2020.  It measures 36 Hounsfield units on today's study.  Therefore it  is most in keeping with a hemorrhagic/proteinaceous cyst.  Additional  hypodensities too small to characterize.     Retroperitoneal structures: There is no abdominal aortic aneurysm.  Atherosclerotic calcification.  There is no retroperitoneal adenopathy or  retroperitoneal mass..     Bowel and mesentery: No evidence of a focal inflammatory or obstructive process.  Moderate fecal retention throughout the colon.  There are a few fluid-filled  small bowel loops in the pelvis, nonspecific.     Lymph nodes: No pathologic lymphadenopathy..     Pelvis: The uterus is normal in size.  The ovaries are normal.  There is no  adnexal mass or pelvic free fluid.     Bones: Within normal limits.    No evidence for pulmonary embolism.  Persistent left-sided pleural effusion and airspace disease at the left lung  base.  Underlying interstitial lung disease.     TTE (7/19/2024, personally reviewed):   Mild increased wall thickness with normal left ventricular cavity and preserved systolic function. EF 65%. No wall motion abnormalities.   Normal right ventricular size with preserved systolic function.   IVC normal in size with >50% respiratory variation consistent with normal right sided filling pressures.   Small pericardial effusion. No evidence of hemodynamic compromise with normal respiratory variation and no chamber collapse. Prominent epicardial fat.   Compared with previous study of 5/23/2024, small pericardial effusion persists.      PET/CT Skull-to-Thigh  (6/4/2024):   An approximately 1.6 cm pulmonary nodule at the medial left lung base is  significantly FDG avid at max SUV 8.5.  This is nonspecific and can be seen in  the setting of benign pathology however malignancy is a concern.     There is more mild to moderate nonspecific uptake localizing to lita-fissural  nodules particularly on the left.     There is  moderate nonspecific left hilar and mediastinal dawson uptake.     No additional suspicious FDG avid findings appreciated.     CTA Chest (5/21/2024):   IMPRESSION:  1.  No evidence of acute pulmonary embolism.  2.  Multiple new perifissural left lower lobe nodules, suspicious for a  neoplastic process. PET/CT and or tissue sampling is recommended.  3.  Chronic interstitial lung disease in an NSIP pattern, with overall slightly  progressed appearance since March 2023. Pulmonary consultation is recommended.  4.  Stable cardiomegaly and large pericardial effusion.  5.  Mediastinal and supraclavicular adenopathy, similar to prior study, also  indeterminate, which could be further evaluated at time of PET/CT.     CTA Chest (4/16/2024):   IMPRESSION:  1. No evidence of pulmonary embolism.  2. Stable cardiomegaly and large pericardial effusion. Cardiology consultation  suggested.  3. Emphysema with bilateral lower lobe groundglass opacity and bronchiectasis in  an NSIP pattern, which can be seen with scleroderma. Pulmonary consultation  suggested.  4. Stable 16 mm left lower lobe masslike density which may represent  atelectasis, lung cancer or lymphoma. When clinically appropriate PET/CT  suggested.  5. Stable prominence of the main pulmonary artery which can be seen with  pulmonary hypertension     TTE (5/23/2024, personally reviewed):  Mild increased wall thickness with normal left ventricular cavity and preserved systolic function. EF 65%. No wall motion abnormalities.   Normal right ventricular size with preserved systolic function.   Tiny IVC with >50%  respiratory variation consistent with low-normal right sided filling pressures.   Small pericardial effusion, predominantly adjacent to right atrium. No evidence of hemodynamic compromise with normal respiratory variation and no chamber collapse. Prominent epicardial fat.   Compared with previous study of 4/10/24, small pericardial effusion persists.      TTE (4/10/2024, personally reviewed):  1. Normal left ventricular cavity size with mild concentric left ventricular hypertrophy. Normal systolic function. EF: 60%. No regional wall motion abnormalities. Cannot determine diastolic function. Global longitudinal strain not reported due to technical limitations of study.   2. Aortic valve has three cusps. Trace aortic regurgitation. Aortic valve and root sclerosis.  3. Thickened mitral valve leaflets. Trace mitral valve regurgitation. Normal left atrial size.   4. Normal right ventricular structure and function. Trace tricuspid valve regurgitation with estimated RVSP: 51 mmHg (assuming right atrial pressure of 3 mmHg). Normal right atrial size. Pulmonic valve structurally normal. Trace pulmonic valve regurgitation. Pulsed wave Doppler of the RVOT systolic profile shows decreased acceleration times and geometry consistent with mildly elevated PVR.  5. IVC size is normal with > 50% inspiratory collapse consistent with normal right atrial pressure. Small-to-moderate sized, circumferential pericardial effusion. No echocardiographic evidence for cardiac tamponade.   6. Compared to previous study from 3/4/2024, no significant change. Pericardial effusion size is similar, estimated right atrial pressure lower.           CTA Abd / Pelvis (3/13/2024, personally reviewed):   There is dilatation of the of proximal/mid small bowel with relatively abrupt  transition left hemiabdomen, likely obstruction. Questionable lesion at the  transition point. This could be better evaluated with CT or MRI enterography.     Aorta and major  branches patent. Superior mesenteric artery patent without  significant stenosis.  Suspect moderate stenosis of the origins of the of renal arteries bilaterally.     Large pericardial effusion, no change.     Patchy opacities lower lungs, similar to prior.      Coronary CTA (3/11/2024):   1. Two-vessel coronary artery disease as above that is moderate in severity.  CT  FFR is normal..  2. There is mitral annular calcification and aortic sclerosis.  The right atrium  is enlarged.  There is left ventricular hypertrophy.  There is a moderate to  large circumferential pericardial effusion.  3. Normal left ventricular wall motion and systolic function.  4. Coronary calcium score 313, 96th percentile     TTE (3/4/2024, personally reviewed):   Normal-sized left ventricle. Mild left ventricular hypertrophy. Preserved systolic function. LVEF 60%. No regional wall motion abnormalities. Grade II diastolic dysfunction.  Normal global longitudinal strain (-20%).  The aortic valve is not well seen.  Cannot determine the number of cusps.  Sclerotic leaflets.  No aortic stenosis.  Trace aortic insufficiency.  Normal sized aortic root.  The visualized portion of the ascending aorta is normal in size.  The mitral valve leaflets are thickened. Mild mitral annular calcification. Trace mitral regurgitation.   Mildly dilated left atrium.  Normal-sized right ventricle. Normal right ventricular systolic function.  Thickened tricuspid valve. There is mild tricuspid regurgitation with estimated RVSP of 46 mmHg.   Pulmonic valve is not well visualized. Trace pulmonic regurgitation.   Mildly dilated right atrium.  IVC is enlarged with <50% respiratory variation consistent with elevated right atrial filling pressure (at least 15 mmHg).   Moderate, circumferential, circumferential pericardial effusion.  The largest pocket is located inferolaterally.  Elevated right atrial pressure.  No other clear echocardiographic features of increased  pericardial pressure.  Correlate clinically.   Compared to previous TTE from 6/21/2023, pericardial effusion now moderate in size.  Right atrial pressure now elevated.         CTA Chest (3/3/2024):   IMPRESSION:  1.  No evidence of acute pulmonary embolism to the level of the segmental  branches.  2.  Moderate to large pericardial effusion measuring higher than simple fluid  density.  3.  Lower lobe lung field predominant interstitial thickening with some relative  subpleural sparing, consistent with a chronic interstitial lung disease,  unchanged from the prior study.  4.  Continued bilateral axillary, mediastinal, and bilateral hilar  lymphadenopathy, not definitely changed from the prior study, possibly related  to the patient's sarcoid.  5.  Emphysema.      CTA Chest PE (10/4/2023):  IMPRESSION:  1. No evidence for filling defect or vessel cutoff to suggest pulmonary  embolism.  Enlargement of the pulmonary arteries compatible with pulmonary  arterial hypertension.  2. Changes throughout the lungs as described above which can be seen with  systemic sclerosis/scleroderma.  Underlying pneumonia the lung bases cannot be  entirely excluded in the proper clinical setting.  3.  Additional stable findings as described above.        VQ (9/12/2023):  IMPRESSION:  Intermediate to high probability of pulmonary embolic disease.     6MWT (7/25/2023, on Sildenafil 20 mg TID):        TTE (6/21/2023, personally reviewed):  1. Normal left ventricular cavity size and mild concentric left ventricular hypertrophy. Normal systolic function. EF: 60%. No regional wall motion abnormalities. Grade I diastolic dysfunction.   2. Aortic valve cusps not well visualized. Trace aortic regurgitation. Aortic valve and root sclerosis.  3. Thickened mitral valve leaflets. Mild mitral annular calcification. Trace mitral regurgitation. Mildly dilated left atrial size.   4. Normal right ventricular structure and function. Mild tricuspid valve  regurgitation with estimated RVSP: 55 mmHg (assuming right atrial pressure of 3 mmHg). Normal right atrial size. Pulmonic valve not well visualized. Trace pulmonic valve regurgitation. Pulsed wave Doppler of the RVOT systolic profile show decreased acceleration times ( msec) and late systolic notching consistent with elevated PVR.   5. IVC size is normal with > 50% inspiratory collapse consistent with normal right atrial pressure. Small pericardial effusion without echocardiographic evidence of tamponade.  6. Compared to previous study from 4/14/2023, estimated right ventricular systolic pressure is higher.        TTE (4/14/2023, personally reviewed):   The left ventricular cavity size is normal with mild left ventricular hypertrophy and preserved systolic function. Estimated EF 65%. No regional wall motion abnormalities. Grade I diastolic dysfunction.     The aortic valve is tricuspid. Aortic valve sclerosis. Trace aortic regurgitation.      The visualized portions of the aortic root and ascending aorta are normal in size.     The mitral valve is mildly thickened. Mild mitral annular calcification. Trace mitral regurgitation.       The left atrium is severely dilated.      The right ventricle is normal in size with preserved systolic function     The pulmonic valve is not well seen.     The tricuspid valve is normal in appearance. mild tricuspid regurgitation with an estimated RVSP of 34 mmHg.       Right atrium is normal in size.     The IVC is small and collapses > 50% with inspiration consistent with normal right atrial pressures.     Small pericardial effusion, mostly posterior.       Compared to previous echocardiogram from 8/22/2022, there is no significant change        TTE (11/28/2022, personally reviewed):   Normal right- and mildly elevated left-sided filling pressures (RA: 3 mmHg, PCWP: 13 mmHg)  Elevated pulmonary artery pressures (60/22(35 mmHg)) in the setting of PCWP: 13 mmHg.   Normal cardiac  output and index (CO: 5.1 L/min, CI: 3.2 L/min/m2)  PVR: 4.3 Wood units. SVR: 1840 dynes/sec/cm5      TTE (8/22/2022, personally reviewed):  Normal-sized LV. Normal LV systolic function. Estimated EF 55- 60%. Wall motion appears grossly normal. Mild concentric left ventricular hypertrophy. Grade I LV diastolic dysfunction.  Pulmonic valve not well visualized. Grossly normal pulmonic valve structure. Trace pulmonic valve regurgitation. No pulmonic valve stenosis.  Aortic valve not well visualized. Aortic valve not well seen but likely trileaflet. Focal aortic valve calcification present. Sclerotic aortic valve leaflets. Mild aortic valve regurgitation. No aortic valve stenosis.  RV not well visualized. Normal-sized RV. Normal RV systolic function.  Normal-sized RA.  There is a small loculated pericardial effusion anterior to the right atrium. No cardiac tamponade.  Sclerotic mitral valve. Mitral valve calcification of the anterior leaflet present.  Trace mitral valve regurgitation.  No significant mitral valve stenosis.  Normal-sized IVC. IVC demonstrates normal respiratory collapse.  Tricuspid valve structure is grossly normal. Mild to moderate tricuspid valve regurgitation.  Estimated RVSP = 50-55 mmHg.  Mildly dilated LA.  No previous echocardiogram available for comparison.     TTE (4/21/2022, outside study):  This result has an attachment that is not available.     A complete transthoracic echocardiogram (including 2D, color flow   Doppler, spectral Doppler and M-mode imaging) was performed using the   standard protocol. The study quality was adequate.     The left ventricle is normal in size. There is moderately increased   wall thickness consistent with moderate concentric hypertrophy.   Endocardium is incompletely visualized, but no regional wall motion   abnormalities are noted in the visualized segments. Normal left   ventricular ejection fraction. The left ventricular ejection fraction by   visual estimate  is 55% to 60%.     The right ventricle is normal in size. There is normal function of the   right ventricle.     The left atrium is normal in size. Left atrial volume is 58 mL.     The right atrial volume measures 52 mL. The right atrial volume index   measures 34 mL/m2.     There is trace mitral regurgitation. There is no mitral stenosis.     The aortic valve is trileaflet. There is mild aortic valve thickening.   There is no aortic stenosis. There is trace aortic regurgitation.     There is mild to moderate tricuspid regurgitation. Pulmonary artery   systolic pressure measures 52 mmHg. The pulmonary artery systolic pressure   is moderately elevated.     The aorta measures 3.2 cm at the sinus of Valsalva. The proximal   segment of the ascending aorta is mildly enlarged. The proximal segment of   the ascending aorta measures 3.4 cm. The proximal segment of the ascending   aorta measures 2.2 cm/m2, indexed for body surface area.     Trivial pericardial effusion present. There is no echocardiographic   evidence of cardiac tamponade.     The inferior vena cava is normal in size (diameter <21 mm) and   decreases >50% in size with inspiration, suggesting a normal right atrial   pressure of 3 mmHg (range 0-5 mm Hg).     Compared to the prior study of 3/13/2020, there are no significant   changes.        PFT (3/23/2022):      PFT (7/14/2021):      CT Chest (5/2/2021, outside study):  CLINICAL INFORMATION: Systemic sclerosis. Interstitial lung disease. Groundglass nodule     PROCEDURE: Unenhanced CT of the chest was performed using thin section reconstructions. Images were obtained on inspiration and expiration.     COMPARISON: June and August 2020     FINDINGS:     LUNGS, PLEURA:     Groundglass opacities with reticulation with subpleural sparing in the lower lobes, without honeycombing or architectural distortion, unchanged.     Right upper lobe groundglass nodule, image 114 of series 3, 8 x 11 mm, previously 6 mm.      Expiratory images are of diagnostic quality and do not demonstrate evidence of clinically significant air trapping.     CARDIOVASCULAR, MEDIASTINUM, THYROID: Coronary calcifications. Trace pericardial effusion.     LYMPH NODES: Subcentimeter mediastinal lymph nodes, unchanged. Unchanged axillary lymph nodes.     SKELETON, CHEST WALL: No destructive bone lesion     UPPER ABDOMEN: Patulous esophagus. 3 mm right renal stone.       RHC (9/02/2020):  RA   8 mmHg   RV   40/5 mmHg   PA (mean)  44/16 (25) mmHg   PCWP   12 mmHg   CO   4.65 lpm (Thermodilution)   CI   2.65 lpm/m-squared   SVI    33 ml/beat/m-squared (lower limit normal 29)   PVR   2.8 mmHg/l/min (Wood units)   SVR   2064 dyne-sec-cm-5         PFT (3/18/2020):      TTE (3/13/2020, outside study):  · The study quality was good.   · The left ventricle is normal in size. Top normal left ventricular wall   thickness. There are no segmental wall motion abnormalities. The left   ventricular ejection fraction is 55-60% by visual estimate and 3D   echocardiography. Normal left ventricular ejection fraction.   · There is grade I (mild) LV diastolic dysfunction.   · The right ventricle is normal in size. There is normal function of the   right ventricle.   · The right atrium is mildly dilated.   · There is mild mitral valve anterior leaflet thickening. There is mild   mitral valve leaflet calcification. There is flattening of the mitral   leaflets without raphael prolapse. There is trace mitral regurgitation.   · The aortic valve is trileaflet. There is mild thickening of the aortic   valve. There is mild calcification of the aortic valve. There is no aortic   /stenosis. There is trace aortic regurgitation.   · There is mild tricuspid valve leaflet thickening. There is mild to   moderate tricuspid regurgitation. The pulmonary artery systolic pressure   is moderately elevated. Pulmonary artery systolic pressure measures 50   mmHg. There is mid-systolic notching of the  RVOT VTI consistent with   elevated pulmonary vascular resistance.   · The inferior vena cava is normal in size (diameter <21 mm) and decreases   >50% in size with inspiration, suggesting a normal right atrial pressure   of 3 mmHg (range 0-5 mm Hg).   · Trivial loculated pericardial effusion present adjacent to the right   ventricle and adjacent to the right atrium.          Assessment / Plan:     1. Chest Pain:  - Given pattern and character, I believe this is musculoskeletal / serous pain from her autoimmune disease. There may be an element of myopericarditis.  - She reports that pain has not responded to increased in steroids in the past   - Non-ACS chest pain and known non-obstructive CAD as recently as March 2024 CCTA.   -A lidocaine patch was placed just prior to me entering the room.  Hopefully this will help her pain.    2. Pulmonary Hypertension (WHO Group I, NYHA/WHO FC III):   - Occurring in the background of Systemic Scleroderma with ILD. August 2022 TTE showed normal RV size and function, but progressive exertional decline, worsened DLCO and resting tachycardia suggest there may be progression of her pulmonary vascular disease and limitation of cardiac output. This was proven with 11/2022 RHC showing mean PA 35 mmHg (with PCWP 13 mmHg) and PVR 4.3 Wood units. Recent TTEs have shown appropriate RV:PA coupling with normal RV size and systolic function.   - She is hypervolemic on exam  - CXR improved today  -Recommend to resume diuresis, ethacrynic acid 50 mg p.o. daily when tolerating PO, has been nauseous and not eating/drinking much. This is higher than home dose so hopefully will achieve diuresis.  Please continue daily standing weights.  Please start recording I's and O's.    - Continue Sildenafil 20 mg TID   - She should continue uninterrupted Macitentan 10 mg daily -her home medication has been brought in and she is receiving it.    3. HTN:   - Continue Amlodipine.     4. Systemic Scleroderma /  Raynaud's Disease:   - Per Rheumatology (Dr. Trevizo).      5. ILD:   - Per Pulmonology and Rheumatology (Dr. Trevizo). She has not tolerated trials of Mycophenolate. She briefly received Tocilizumab.     6. PE:   - She has had elevated probability on V/Q scans, but CTA Chest has consistently not shown evidence for acute or chronic thromboembolism. Apixaban has been discontinued.     7. CAD:   - Two-vessel non-obstructive disease on March 2024 CCTA. She is continued on Atorvastatin 40 mg daily.     8. Toe Ulcerations  - Per Rheumatology, unlikely to see LE ulcerations with scleroderma. S/P left PTA dSFA/TP and DCB to popliteal arteries 8/14/24. Followed by vascular surgery. Started on Plavix which is currently being held due to hematoma found on CT scan along with hemoglobin drop.    9. Lung Mass  - Pathology from 8/8 biopsy with atypical glandular cells, possibly consistent with adenocarcinoma  - Management per pulmonology    KARYNA Carmona  8/16/2024

## 2024-08-16 NOTE — CONSULTS
Nutrition Assessment    Recommendations  Regular diet, 2000 mL fluid per team  CHF diet education offered, pt is in pain, declined visit, attached CHF handouts to AVS  Continue with Ensure Plus BID, PO varies           Clinical Course: Patient is a 63 y.o. female who was admitted on 8/8/2024 with a diagnosis of Lung nodule [R91.1]  Thoracic lymphadenopathy [R59.0]  Pulmonary edema, acute (CMS/HCC) [J81.0].     Past Medical History:   Diagnosis Date    At risk for falls     De Quervain's tenosynovitis     Essential (primary) hypertension     Follicular carcinoma of thyroid (CMS/HCC)     Gastro-esophageal reflux     Hand ulceration (CMS/HCC)     Hyperlipidemia, unspecified     Interstitial lung disease (CMS/HCC)     Leg ulcer (CMS/HCC)     Lung nodule     Lupus (CMS/HCC)     O2 dependent     On 2L    Osteomyelitis of hand (CMS/HCC)     Osteoporosis     Pulmonary hypertension (CMS/HCC)     PVD (peripheral vascular disease) (CMS/HCC)     Raynaud's disease     Severe    Reflux esophagitis     Scleroderma (CMS/HCC)     Screening mammogram for breast cancer 05/04/2021    BI-RADS category: 1 - Negative (care everywhere @ [facility])    Vertigo     Wound, open, foot     bilateral,dressing change with medihoney and mupirocin ointment by homecare nurse     Past Surgical History:   Procedure Laterality Date    CARDIAC CATHETERIZATION      COLONOSCOPY      HERNIA REPAIR      THYROIDECTOMY         Reason for Assessment  Reason For Assessment: physician consult, per organizational policy (Cx - CHF diet edu, F/U)  Diagnosis:  (pulmonary edema)     [facility] Nutrition Screen Tool  Has patient lost weight without trying?: 0-->No  Has patient been eating poorly due to decreased appetite?: 0-->No  [facility] Nutrition Screen Score: 0     Nutrition/Diet History  Intake (%): 75%     Physical Findings  Last Bowel Movement: 08/16/24  Skin: surgical incision, other (see comments) (incision on R groin-WDL, wounds on toes-dry)     [email or web address] Physical  "Appearance  Last Bowel Movement: 08/16/24  Skin: surgical incision, other (see comments) (incision on R groin-WDL, wounds on toes-dry)     Nutrition Order  Nutrition Order: meets nutritional requirements  Nutrition Order Comments: Regular, 2000 mL fluid  Current TF/TPN Regimen: No     Anthropometrics  Height: 167.6 cm (5' 6\")           Current Weight  Weight Method: Standing scale  Weight: 52.1 kg (114 lb 12.8 oz)     Ideal Body Weight (IBW)  Ideal Body Weight (IBW) (kg): 59.58  % Ideal Body Weight: 84.51            Body Mass Index (BMI)  BMI (Calculated): 18.5  BMI Assessment: BMI 18.5-24.9: normal                       Labs/Procedures/Meds  Lab Results Reviewed: reviewed     [email or web address] Lab Results  Lab Results Reviewed: reviewed   BMP Results         08/16/24 08/15/24 08/13/24     0630 0901 0958     139 141    K 4.4 4.3 3.8    Cl 105 103 106    CO2 29 26 29    Glucose 83 99 113    BUN 45 47 23    Creatinine 1.4 1.4 1.0    Calcium 8.8 9.0 9.4    Anion Gap 6 10 6    EGFR 42.4 42.4 >60.0           Comment for BUN at 0901 on 08/15/24: Result rechecked    Comment for EGFR at 0630 on 08/16/24: Calculation based on the Chronic Kidney Disease Epidemiology Collaboration (CKD-EPI) equation refit without adjustment for race.    Comment for EGFR at 0901 on 08/15/24: Calculation based on the Chronic Kidney Disease Epidemiology Collaboration (CKD-EPI) equation refit without adjustment for race.    Comment for EGFR at 0958 on 08/13/24: Calculation based on the Chronic Kidney Disease Epidemiology Collaboration (CKD-EPI) equation refit without adjustment for race.               Medications  Pertinent Medications Reviewed: reviewed    acetaminophen  975 mg oral q6h SPENCER    amLODIPine  10 mg oral q AM    artificial tears  1 drop Both Eyes QID    atorvastatin  40 mg oral Daily (6p)    [Provider Managed Hold] clopidogreL  75 mg oral Daily    cyanocobalamin  1,000 mcg oral Daily    [Provider Managed Hold] ethacrynic acid  50 mg oral " "Daily    levothyroxine  100 mcg oral Daily (6:30a)    lidocaine  1 patch Topical Daily    lidocaine  1 patch Topical Daily    lidocaine  1 patch Topical Daily    macitentan  10 mg oral Daily    pantoprazole  40 mg intravenous q12h SPENCER    [Provider Managed Hold] pantoprazole  20 mg oral Daily    artificial tears  1 drop Both Eyes q4h while awake    polyethylene glycol  17 g oral BID    senna  2 tablet oral BID    sildenafiL (pulm.hypertension)  20 mg oral TID    sodium chloride  2 spray Each Nostril Daily    white petrolatum   Topical BID    white petrolatum-mineral oil   Both Eyes Nightly       Estimated/Assessed Needs  Additional Documentation: Calorie Requirements (Group), Protein Requirements (Group)     Calorie Requirements  Estimated kCal Needs: Actual Body Weight  Estimated Calorie Need Method: kcal/kg  Calorie/kg Recommended: 30-35  Calorie Recommendations: 7679-8881 kcal                   Protein Requirements  Recommended Dosing Weight (Estimated Protein Needs): Actual Body Weight  Est Protein Requirement Amount (gms/kg): 1.5-->1.5 gm protein  Protein Recommendations: 75 g                         Fluid requirements 2000 mL fluid per team    Dosing weight  ABW 50.8 kg at last visit, CBW 52.1 kg or 114 lbs                    Skin: incision on R groin-WDL, wounds on toes-dry     NFPE: Generalized wasting, pt is in pain, refused visit.     Clinical comments: Pt is seen for Cx on CHF diet education, also for F/U visit. Was sleeping at first visit, talked to RN. Was told that I can wake pt up to talk. Pt refused the visit, said has pain, can't talk. CHF diet handouts are attached to AVS. Was told PO varies today, will continue sending ONS for additional calorie/protein. PTA: pt has aide cooks for her. Stated pt weight 109 lbs. Per EMR, wt fluctuations are seen with possibly fluid shift with CHF. Not on diuretic recently.  LBM 8/15. Will continue to monitor.     Per Gastroenterology: \"PMHX of pHTN, HTN, follicular " "carcinoma of thyroid, ILD, Raynauds, Cutaneous systemic scleroderma (dx 1998), PE (3/2023), HFpEF, CAD who initially presented for EBUS bronch for pulmonary nodule, now with c/f brown emesis with c/f potential coffee ground emesis\"     \"s/p LLE angio, balloon angio of distal SFA, TP trunk, DCB angio of popliteal artery on 8/14 now with concern for R inguinal RP hematoma\".     Goals: PO intake >75% meals through f/u   Monitor: PO intake, diet order, lab values, weight trends, skin integrity, plan of care          Recommendations: See above     PES  Statement: PES Statement  Nutrition Diagnosis: Inadequate Energy Intake  Related To:: Decreased ability to consume sufficient energy  As Evidenced By:: BMI 18.08  Nutritional Needs Met?: Yes                                   Date: 08/16/24  Signature: Aniceto Latham RD  "

## 2024-08-16 NOTE — PLAN OF CARE
Problem: Adult Inpatient Plan of Care  Goal: Plan of Care Review  Outcome: Progressing  Flowsheets (Taken 8/16/2024 3607)  Progress: improving  Outcome Evaluation: OT cece completed  Plan of Care Reviewed With: patient     Problem: Self-Care Deficit  Goal: Improved Ability to Complete Activities of Daily Living  Outcome: Progressing

## 2024-08-16 NOTE — PROGRESS NOTES
"Palliative Care Progress Note    This is Hospital Day: 9    Conversation/Goals of Care: Life prolongation, restorative.       Anxious appearance  Assessment & Plan  -anxiety likely related to fear of medication allergies    Patient tearful expressing feeling overwhelmed with the frequency of hospitalizations and complexity of medical conditions.     Plan  - Trial lorazepam 0.25 mg PO x 1, monitor for response  -possible Psych evaluation given medical complexity    Other constipation  Assessment & Plan  -LBM : unknown  -Constipation likely multifactorial r/t current hospitalization, heart failure on diuretic therapy, debility related to multiple medical comorbid conditions  -CT abdomen 8/9 with moderate colonic stool burden       Plan:  - 1 packet Miralax BID as needed  - Senokot x2 tabs PO, BID, Scheduled - hold for diarrhea  - Bisacodyl 10mg NM, qDay, PRN - if no BM in >2 days  - Monitor bowel patterns and adjust bowel regimen PRN.      Pain  Assessment & Plan  - Chronic pain attributed to: chronic chest pain, scleroderma c/b pulmonary HTN/ILD, Raynaud's Disease  - New right lower abdominal pain worse after s/p LLE angio on 8/14.     - Patient with numerous allergies to various pain medications (opioids/NSAIDS)  - PDMP queried: no recent scripts  - Home regimen: lidocaine patch  - Medications available inpatient: Acetaminophen 975 mg p.o. every 6 hours, lidocaine patches, cyclobenzaprine 5 mg p.o. 3 times daily as needed, heating pad  - Tramadol d/c'd 2/2 dizziness  - lyrica d/c'd 2/2 blurred vision  Patient received IV Fentanyl for angio without adverse event.   Recommend Fentanyl 12.5 IVP every 3 hours as needed but patient has declined to use it stating, I don't want to be on those kinds of drugs\".    Plan:  - Per Pain Management rec's  - Would establish care with a pain management provider        Palliative care by specialist  Assessment & Plan  63 y.o. female with a PMH significant for severe scleroderma " "complicated by pulmonary hypertension, ILD.  She presented to [facility] on 8/8 for planned robotic navigational and EBUS bronchoscopies to evaluate growing LLL nodule c/b chest pain and volume overload.     - Code status: Full Code  - Prognosis:  High risk for acute deterioration and decline  - Capacity for Medical Decision Making: intact  - Advance Directives: No  - Goals of care: Goals are Life-prolonging/disease directed.  Refer to ACP note dated 8/10.  - Surrogate Decision Maker: Patient identifies her 2 adult children, Tiffanie and Wagner, as joint surrogate decision makers        Debility  Assessment & Plan  - Debility likely multifactorial in the setting of multiple chronic med conditions including scleroderma and CHF with current hospitalization, s/p LLE angio c/b hematoma.   - Living situation prior to hospitalization: Lives at home with 24/7 aides  - Baseline functional status is: Ambulatory short distances  - Current Palliative Performance Status: 30%  - Baseline Palliative Performance Status:  50-60%    - Frailty   - Patient remains high risk for further deterioration and functional decline.    Plan  - Patient open to both palliative bridge and home based palliative NP visits            Interval History (Subjective)    Source: chart review and the patient and nurse    Patient awake, alert oriented X3 reporting continued 10/10 right lower abdominal pain that radiates to the back and left upper chest pain that is unrelieved with the current pain regimen.  She denies nausea, no vomiting since this morning. Discussed the use of IV Fentanyl as an option however she has declined to take it saying, \"I don't want to take those kinds of drugs\".     Current Medications:    acetaminophen, 975 mg, oral, q6h SPENCER    amLODIPine, 10 mg, oral, q AM    artificial tears, 1 drop, Both Eyes, QID    atorvastatin, 40 mg, oral, Daily (6p)    benzocaine-menthol, 1 lozenge, Mouth/Throat, q2h PRN    benzonatate, 100 mg, oral, 3x daily " PRN    carboxymethylcellulose, 1 drop, Right Eye, 3x daily PRN    [Provider Managed Hold] clopidogreL, 75 mg, oral, Daily    cyanocobalamin, 1,000 mcg, oral, Daily    cyclobenzaprine, 5 mg, oral, 3x daily PRN    glucose, 16-32 g of dextrose, oral, PRN **OR** dextrose, 15-30 g of dextrose, oral, PRN **OR** glucagon, 1 mg, intramuscular, PRN **OR** dextrose 50 % in water (D50), 25 mL, intravenous, PRN    diphenhydrAMINE, 50 mg, oral, q8h PRN    docusate sodium, 100 mg, oral, Daily PRN    [Provider Managed Hold] ethacrynic acid, 50 mg, oral, Daily    fentaNYL, 12.5 mcg, intravenous, q4h PRN    honey, , Topical, Daily PRN    iopamidoL, 100 mL, intravenous, Once in imaging    levothyroxine, 100 mcg, oral, Daily (6:30a)    lidocaine, 1 patch, Topical, Daily    lidocaine, 1 patch, Topical, Daily    lidocaine, 1 patch, Topical, Daily    macitentan, 10 mg, oral, Daily    melatonin, 3 mg, oral, Nightly PRN    ondansetron ODT, 4 mg, oral, q8h PRN **OR** ondansetron, 4 mg, intravenous, q8h PRN    pantoprazole, 40 mg, intravenous, q12h SPENCER    [Provider Managed Hold] pantoprazole, 20 mg, oral, Daily    artificial tears, 1 drop, Both Eyes, q4h while awake    phenol, 1 spray, Mouth/Throat, q2h PRN    polyethylene glycol, 17 g, oral, BID    polyethylene glycol, 17 g, oral, 2x daily PRN    senna, 2 tablet, oral, BID    sildenafiL (pulm.hypertension), 20 mg, oral, TID    sodium chloride, 2 spray, Each Nostril, Daily    white petrolatum, , Topical, q4h PRN    white petrolatum, , Topical, BID    white petrolatum-mineral oil, , Both Eyes, Nightly    ethacrynic acid    Objective    Physical Exam:  General:   No Acute Distress  Eyes:  Sclera Anicteric  ENMT:  Mucus Membranes Moist  CV:  Radial Pulses 3  bilateral  Lungs:  No evidence of increased WOB  Abdomen:  Bowel Sounds present  Psych:  Appropriate and Cooperative  Neuro:  Awake, Alert and Oriented  person, place, time/date and situation  Skin:  scleroderma     Vitals:  Vitals:     08/16/24 1505   BP: (!) 114/56   Pulse: 77   Resp:    Temp: 36.6 °C (97.8 °F)   SpO2: 99%       Laboratory Studies:    CBC Results         08/16/24 08/15/24 08/15/24     0608 1655 0901    WBC 8.50 10.39 12.22    RBC 3.15 3.63 3.61    HGB 7.7 8.7 8.7    HCT 26.3 29.9 28.7    MCV 83.5 82.4 79.5    MCH 24.4 24.0 24.1    MCHC 29.3 29.1 30.3     203 205       CMP Results         08/16/24 08/15/24 08/13/24     0630 0901 0958     139 141    K 4.4 4.3 3.8    Cl 105 103 106    CO2 29 26 29    Glucose 83 99 113    BUN 45 47 23    Creatinine 1.4 1.4 1.0    Calcium 8.8 9.0 9.4    Anion Gap 6 10 6    AST -- 18 --    ALT -- 14 --    Albumin -- 3.5 --    EGFR 42.4 42.4 >60.0         Troponin I Results         08/09/24 08/09/24 07/25/24     1232 1048 1732    HS Troponin I 93.8 92.5 66.8         ABG Results         04/13/23     1949    Source Of Oxygen nc         Labs reviewed-significant for anemia and elevated creatinine    Imaging and Other Studies:     Radiology Imaging    XR CHEST 1 VW    Narrative  CLINICAL HISTORY: volume   .    COMMENT: AP chest radiograph was obtained. Comparison is made with recent  studies.    There is ongoing diffuse vascular/interstitial prominence with cardiomegaly.  Pleural effusions are not excluded without gross change from prior. There is no  definite pneumothorax. Osseous degenerative changes are noted.    Impression  IMPRESSION: Ongoing interstitial thickening suggestive of pulmonary edema,  similar to prior.                                                                      Cardiac Imaging    TRANSTHORACIC ECHO (TTE) LIMITED 07/19/2024    Interpretation Summary  Rhythm is sinus tachycardia.    Mild increased wall thickness with normal left ventricular cavity and preserved systolic function. EF 65%. No wall motion abnormalities.  Normal right ventricular size with preserved systolic function.  IVC normal in size with >50% respiratory variation consistent with normal right sided  filling pressures.  Small pericardial effusion. No evidence of hemodynamic compromise with normal respiratory variation and no chamber collapse. Prominent epicardial fat.  Compared with previous study of 5/23/2024, small pericardial effusion persists.       I have independently reviewed the pertinent imaging to the time of note and agree with reported results. CT A/P showing Hematoma in the right inguinal region.       Lab Results   Component Value Date    QTCCALCULAT 473 08/13/2024       Discussed with: Medical Team, RN     KARYNA Pop  Palliative Care Nurse Practitioner  [facility]  [phone] [facility] office  [phone] [facility] Fax  [address]  [place] [id number]  Pager [phone]

## 2024-08-16 NOTE — PROGRESS NOTES
Physical Therapy -  Initial Evaluation     Patient: Arielle Felix  Location: [facility] [address] [facility]  MRN: [id number]  Today's date: 8/16/2024    HISTORY OF PRESENT ILLNESS     Arielle is a 63 y.o. female admitted on 8/8/2024 with Lung nodule [R91.1]  Thoracic lymphadenopathy [R59.0]  Pulmonary edema, acute (CMS/HCC) [J81.0]. Principal problem is Pulmonary edema, acute (CMS/HCC).    Past Medical History  Arielle has a past medical history of At risk for falls, De Quervain's tenosynovitis, Essential (primary) hypertension, Follicular carcinoma of thyroid (CMS/HCC), Gastro-esophageal reflux, Hand ulceration (CMS/HCC), Hyperlipidemia, unspecified, Interstitial lung disease (CMS/HCC), Leg ulcer (CMS/HCC), Lung nodule, Lupus (CMS/HCC), O2 dependent, Osteomyelitis of hand (CMS/HCC), Osteoporosis, Pulmonary hypertension (CMS/HCC), PVD (peripheral vascular disease) (CMS/HCC), Raynaud's disease, Reflux esophagitis, Scleroderma (CMS/HCC), Screening mammogram for breast cancer (05/04/2021), Vertigo, and Wound, open, foot.    History of Present Illness   Ms Felix is a 63 y.o. 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 (+) intractable bony chest    Admitted following lung biopsy in the setting of pulmonary edema.         Additionally, post-procedure she was found to have intractably bony chest pain which has not been responsive to tylenol.      8/9 podiatry consulted for  necrosis of the left second toe along with other chronic BLE wounds. WBAT to BLE      Late 8/10 noted right eye pain,, swelling, blurry vision, ophthalmology consulted; CTH neg for acute intracranial abnormalities  -improving with lubricant drops, r eye also improving.        8/10 BLE arterial US: indings concerning for  "hemodynamically significant stenosis within bilat popliteal arteries and distal left femoral artery\"     8/11 Ophthalmology was consulted for concerns of right eye swelling and blurry vision.     8/14:  S/p LLE angio, balloon angio of distal SFA, TP trunk, DCB angio of popliteal artery on 8/14.   Per general surgery note on 8/15 OOB/light walking as tolerated   PRIOR LEVEL OF FUNCTION AND LIVING ENVIRONMENT     Prior Level of Function      Flowsheet Row Most Recent Value   Dominant Hand right   Ambulation assistive person   Transferring assistive person   Toileting assistive person   Bathing assistive equipment and person   Dressing assistive person   Eating independent   IADLs assistive person   Driving/Transportation friends/family   Communication understands/communicates without difficulty   Prior Level of Function Comment Pt reports amb with A from caregivers or furniture surfing, A for ADLs/IADLs. Pt wears 2L O2 at home, and has 24/7 caregivers.   Assistive Device Currently Used at Home wheelchair, oxygen, stair glide        History of Falls: One fall in the past year    Prior Living Environment      Flowsheet Row Most Recent Value   People in Home alone   Current Living Arrangements home   Living Environment Comment Pt lives in 2SH with 5-6 LARRY, FF to b/b with stairglide, with tub/shower set up.          VITALS AND PAIN     PT Vitals      Date/Time Pulse HR Source SpO2 Pt Activity O2 Therapy O2 Del Method O2 Flow Rate BP BP Location BP Method Pt Position [facility]   08/16/24 0955 98 Monitor 97 % At rest Supplemental oxygen Nasal cannula 2 L/min 124/60 Right upper arm Automatic Sitting CP   08/16/24 1012 103 Monitor 97 % At rest Supplemental oxygen Nasal cannula 2 L/min 122/55 Right upper arm Automatic Sitting CP          PT Pain      Date/Time Pain Type Location Rating: Rest Rating: Activity Interventions [facility]   08/16/24 0955 Pain Assessment abdomen 10 - excruciating pain 10 - excruciating pain care clustered CP      "        Objective   OBJECTIVE     Start time:  0955  End time:  1012  Session Length: 17 min       General Observations  Patient received upright, in bed. She was agreeable to therapy, no issues or concerns identified by nurse prior to session. Pt received sitting EOB awake/alert expressing pain.    Precautions: fall        Services  Do You Speak a Language Other Than English at Home?: no  Is an  Needed/Used?: No      PT Eval and Treat - 08/16/24 0955          Cognition    Orientation Status oriented x 3     Affect/Mental Status emotionally labile;anxious     Behavioral Issues overwhelmed easily     Follows Commands follows one-step commands;follows two-step commands;75-90% accuracy;verbal cues/prompting required;repetition of directions required     Cognitive Function safety deficit;executive function deficit     Executive Function Deficit minimal deficit;insight/awareness of deficits;judgment     Safety Deficit minimal deficit;awareness of need for assistance;judgment;problem-solving     Comment, Cognition Pt pleasant and cooperative despite being very sick and uncomfortable. Pt able to expess needs and follow commands with minor cues. Pt receptive to education during session.        Vision Assessment/Intervention    Vision Assessment WFL        Hearing Assessment    Hearing Status WFL        Sensory Assessment    Sensory Assessment sensation intact, lower extremities;sensation intact, upper extremities        Lower Extremity Assessment    LE Assessment ROM and Strength WFL        Bed Mobility    Comment Pt received sitting EOB on arrival.        Mobility Belt    Mobility Belt Used During Session no - other (see comments)     Reason Mobility Belt Not Used pt with 10/10 abdominal pain        Sit/Stand Transfer    Surface edge of bed     [place] minimum assist (75% or more patient effort);2 person assist     Safety/Cues minimal;verbal cues;hand placement;technique     Assistive Device other  (see comments)     Transfer Comments Pt completed STS form EOB Min A x2 for full erect stance and steadying with minot VC for technique.        Surface-to-Surface Transfers    Transfer Location bed to chair     Transfer Technique stand step     [place] minimum assist (75% or more patient effort);2 person assist     Safety/Cues increased time to complete;minimal;technique     Assistive Device other (see comments)     Transfer Comments Pt completed bed>chair Min A x2 for balance and stability with x2 anterolateral support at axilla and buttocks, pt able to tale 2-3 steps to the chair with VC for technique.        Balance    Static Sitting Balance WFL;sitting, edge of bed     Dynamic Sitting Balance WFL;sitting, edge of bed     Sit to Stand Dynamic Balance mild impairment;supported     Static Standing Balance mild impairment;supported     Dynamic Standing Balance mild impairment;supported     Comment, Balance Pt all functional mobility Min A x2 with x2 anterolateral support.        Impairments/Safety Issues    Impairments Affecting Function balance;endurance/activity tolerance;pain     Functional Endurance Pt limited by significant pain this session.                                    Education Documentation  Joint Mobility/Strength, taught by Lubna Stroud PT at 8/16/2024 11:34 AM.  Learner: Patient  Readiness: Acceptance  Method: Explanation  Response: Verbalizes Understanding  Comment: role of PT, safe mobility, pacing    Energy Conservation, taught by Lubna Stroud PT at 8/16/2024 11:34 AM.  Learner: Patient  Readiness: Acceptance  Method: Explanation  Response: Verbalizes Understanding  Comment: role of PT, safe mobility, pacing        Session Outcome  Patient upright, in chair at end of session, call light in reach, personal items in reach, chair alarm on, all needs met. Nursing notified about patient's position, patient's performance, change in vital signs, and patient's response to  therapy/activity.    AM-PAC™ - Mobility (Current Function)     Turning form your back to your side while in flat bed without using bedrails 4 - None   Moving from lying on your back to sitting on the side of a flat bed without using bedrails 3 - A Little   Moving to and from a bed to a chair 3 - A Little   Standing up from a chair using your arms 3 - A Little   To walk in a hospital room 3 - A Little   Climbing 3-5 steps with a railing 2 - A Lot   AM-PAC™ Mobility Score 18      ASSESSMENT AND PLAN     Progress Summary  PT eval completed. Pt all functional mobility Min A x2 with x2 anterolateral support. Pt received sitting EOB, Pt STS from EOB Min A x2 x2 anterolateral support at axilla and buttocks for full erect stance. Pt completed bed>chair able to take 2-3 steps Min A x2 with x2 anterolateral support with VC for technique. Rec Home with 24/7 care.         PT Plan      Flowsheet Row Most Recent Value   Rehab Potential fair, will monitor progress closely at 08/16/2024 0955   Therapy Frequency 3 times/wk at 08/16/2024 0955   Planned Therapy Interventions balance training, bed mobility training, gait training, home exercise program, ROM (range of motion), stair training, strengthening, transfer training, stretching at 08/16/2024 0955            PT Discharge Recommendations      Flowsheet Row Most Recent Value   PT Recommended Discharge Disposition home with assistance at 08/16/2024 0955   Anticipated Equipment Needs if Discharged Home (PT) none at 08/16/2024 0955                 PT Goals      Flowsheet Row Most Recent Value   Bed Mobility Goal 1    Activity/Assistive Device bed mobility activities, all at 08/16/2024 0955   [place] supervision required at 08/16/2024 0955   Time Frame short-term goal (STG), by discharge at 08/16/2024 0955   Progress/Outcome goal ongoing at 08/16/2024 0955   Transfer Goal 1    Activity/Assistive Device all transfers at 08/16/2024 0955   [place] supervision required at  08/16/2024 0955   Time Frame short-term goal (STG), by discharge at 08/16/2024 0955   Progress/Outcome goal ongoing at 08/16/2024 0955   Gait Training Goal 1    Activity/Assistive Device gait (walking locomotion), other (see comments)  [HHA] at 08/16/2024 0955   Story minimum assist (75% or more patient effort) at 08/16/2024 0955   Distance 15 at 08/16/2024 0955   Time Frame short-term goal (STG), by discharge at 08/16/2024 0955   Progress/Outcome goal ongoing at 08/16/2024 0955

## 2024-08-16 NOTE — PROGRESS NOTES
Daily Progress Note    Subjective  63 y.o. F a/f volume overload + pain control (s/p EBUS LLL lung nodule 8/8) c/f bilat pop and distal fem stenosis. S/p LLE angio, balloon angio of distal SFA, TP trunk, DCB angio of popliteal artery on 8/14.     Interval History  Patient underwent CTA yesterday after hemoglobin drop 10.3 to 8.7, multiple episodes of dark tinged sputum and emesis that appeared to be coffee ground with saliva, and severe right groin pain adjacent to surgical site.  CTA demonstrated hematoma in the right inguinal region.   On exam this morning the patient is in severe pain and very hesitant to be examined.  She is extremely tender to any touch including soft touch without palpation.  There does not appear to be any palpable hematoma in the right groin region.  The patient was turned over and examined and there does not appear to be any bruising of the right flank or lower back, she is exquisitely tender in these regions as well.  Hemoglobin is now 7.7 from 8.7.  Patient did note some dizziness while we are examining her.    Objective  Vital signs in last 24 hours:  Temp:  [36.5 °C (97.7 °F)-36.8 °C (98.2 °F)] 36.7 °C (98.1 °F)  Heart Rate:  [] 90  Resp:  [16] 16  BP: (103-124)/(51-60) 115/55      Intake/Output Summary (Last 24 hours) at 8/16/2024 1245  Last data filed at 8/16/2024 0140  Gross per 24 hour   Intake 700 ml   Output 500 ml   Net 200 ml       Labs  Lab Results   Component Value Date    WBC 8.50 08/16/2024    HGB 7.7 (L) 08/16/2024    HCT 26.3 (L) 08/16/2024     (L) 08/16/2024    CHOL 148 03/07/2024    TRIG 70 03/07/2024    HDL 49 (L) 03/07/2024    ALT 14 08/15/2024    AST 18 08/15/2024     08/16/2024    K 4.4 08/16/2024     08/16/2024    CREATININE 1.4 (H) 08/16/2024    BUN 45 (H) 08/16/2024    CO2 29 08/16/2024    TSH 0.33 (L) 07/19/2024    INR 1.0 07/22/2024    HGBA1C 4.4 04/13/2023       Physical Exam  General appearance: alert, cooperative, sitting in  bed  Head: normocephalic, without obvious abnormality, atraumatic  Neck: supple, symmetrical, trachea midline   Lungs: no increased breathing effort, bilateral chest rise & fall   Heart: regular rate and rhythm  Abdomen: soft, nondistended  Extremities: right groin site without obvious hematoma, exquisitely tender to palpation.   Pulses: palpable LLE DP, BL dopplerable DP, AT, PT  Skin: Skin color & texture normal. No rashes or lesions  Neurologic: grossly normal    Imaging  I have reviewed the imaging in the past 24 hours     Assessment & Plan  63 y.o. female s/p LLE angio, balloon angio of distal SFA, TP trunk, DCB angio of popliteal artery on 8/14 now with concern for R inguinal RP hematoma.    - Recommend ultrasound study of groin if possible  -If ultrasound study is too difficult for patient or quality of study is poor, recommend CTA  -Trend hemoglobin        Kush Henao MD  General Surgery  Please page [phone] with any questions

## 2024-08-16 NOTE — ASSESSMENT & PLAN NOTE
Imaging reviewed by Vascular Dr. Thomas; Probable small hematoma at the right groin near the access site extending into the preperitoneal area without evidence of contrast extravasation. There is no pseudoaneurysm on US.  --Hgb has been stable without need for blood transfusion  --Plavix restarted 8/17

## 2024-08-16 NOTE — PLAN OF CARE
Problem: Adult Inpatient Plan of Care  Goal: Plan of Care Review  Outcome: Progressing  Flowsheets (Taken 8/16/2024 1436)  Progress: improving  Outcome Evaluation: pt for ct scan and us abdomen.  Plan of Care Reviewed With: patient  Goal: Patient-Specific Goal (Individualized)  Outcome: Progressing  Flowsheets (Taken 8/16/2024 1436)  Patient/Family-Specific Goals (Include Timeframe): pt will have less pain this shift  Individualized Care Needs: pain management  Anxieties, Fears or Concerns: concerned about pain and nausea   Plan of Care Review  Plan of Care Reviewed With: patient  Progress: improving  Outcome Evaluation: pt for ct scan and us abdomen.

## 2024-08-16 NOTE — CONSULTS
Gastroenterology  Consultation Note       REASON FOR CONSULT   CGE       HISTORY OF PRESENT ILLNESS      This is a 63 y.o. female with a significant PMH of pHTN, HTN, follicular carcinoma of thyroid, ILD, Raynauds, Cutaneous systemic scleroderma (dx 1998), PE (3/2023), HFpEF, CAD who initially presented for EBUS bronch for pulmonary nodule, now with c/f brown emesis with c/f potential coffee ground emesis.     Patient's hospital course has been complicated by angio and balloon dilation for complex PAD for dry gangrene s/p plavix load, active CP thought to be 2/2 msk / serous pain from her autoimmune disease and titration of pHTN meds. Yesterday evening, pt had one witnessed episode of vomitus (attached below). The day prior, she received a total of 150mg prednisone for anaphylaxis ppx for contrast with her angio, and was plavix loaded with 150mg on 8/14 and 75mg 8/15. Given this concern, primary team has held her am dose today of plavix. She underwent CTAP w/o contrast revealing hematoma at R inguinal region near site of prior angio. Hgb was stable at 8.7 yesterday and had dropped to 7.7 this am.    The patient today states that she had a few episodes of vomiting yesterday, and that she is nauseous this am. She complains of her R inguinal pain, no abd pain, no esophagitis or epigastric pain. Denies any overt hematemesis. Nursing confirms only one episode of vomitus yesterday which is pictured in media tab.     PAST MEDICAL AND SURGICAL HISTORY      PMHx:  Past Medical History:   Diagnosis Date    At risk for falls     De Quervain's tenosynovitis     Essential (primary) hypertension     Follicular carcinoma of thyroid (CMS/HCC)     Gastro-esophageal reflux     Hand ulceration (CMS/HCC)     Hyperlipidemia, unspecified     Interstitial lung disease (CMS/HCC)     Leg ulcer (CMS/HCC)     Lung nodule     Lupus (CMS/HCC)     O2 dependent     On 2L    Osteomyelitis of hand (CMS/HCC)     Osteoporosis     Pulmonary  hypertension (CMS/HCC)     PVD (peripheral vascular disease) (CMS/HCC)     Raynaud's disease     Severe    Reflux esophagitis     Scleroderma (CMS/HCC)     Screening mammogram for breast cancer 05/04/2021    BI-RADS category: 1 - Negative (care everywhere @ [place])    Vertigo     Wound, open, foot     bilateral,dressing change with medihoney and mupirocin ointment by homecare nurse       PSHx:  Past Surgical History:   Procedure Laterality Date    CARDIAC CATHETERIZATION      COLONOSCOPY      HERNIA REPAIR      THYROIDECTOMY         PCP:   Sara Simmons MD    MEDICATIONS      Medications Prior to Admission   Medication Sig Dispense Refill Last Dose    amLODIPine (NORVASC) 5 mg tablet Take 10 mg by mouth every morning.   8/14/2024    atorvastatin (LIPITOR) 40 mg tablet Take 1 tablet (40 mg total) by mouth daily. 30 tablet 2 Past Week    benzonatate (TESSALON) 100 mg capsule Take 100 mg by mouth 3 (three) times a day as needed for cough.   Past Month    biotin 1 mg tablet Take 1,000 mcg by mouth daily.   Past Week    cholecalciferol, vitamin D3, 1,000 unit (25 mcg) tablet Take 1,000 Units by mouth daily.   Past Week    collagenase (SantyL) ointment Apply 1 Application topically daily.   Past Week    cyanocobalamin, vitamin B-12, 1,000 mcg capsule Take 1,000 mcg by mouth daily.   Past Week    levothyroxine (SYNTHROID) 100 mcg tablet Take 100 mcg by mouth daily. BRAND ONLY   8/14/2024    MEDIHONEY, HONEY, TOP Apply topically daily.   Past Week    mupirocin (BACTROBAN) 2 % ointment Apply 1 application. topically daily. Behind ears   Past Week    OPSUMIT 10 mg tablet tablet Take 1 tablet (10 mg total) by mouth daily. 30 tablet 0 8/8/2024    sildenafiL, pulm.hypertension, (REVATIO) 20 mg tablet Take 1 tablet (20 mg total) by mouth 3 (three) times a day. 270 tablet 3 8/14/2024    tocilizumab (ACTEMRA) 200 mg/10 mL (20 mg/mL) solution Infuse 8 mg/kg into a venous catheter every 28 (twentyeight) days.   Past Month    docusate  sodium (COLACE) 100 mg capsule Take 100 mg by mouth daily as needed for constipation.   More than a month    hydrocortisone 1 % cream Apply to affected area 2 times daily 15 g 0     lidocaine (ASPERCREME) 4 % adhesive patch,medicated topical patch Apply 1 patch topically daily as needed (pain). Remove & discard patch within 12 hours or as directed by prescriber. 30 patch 0     loperamide (IMODIUM) 2 mg capsule Take 2 mg by mouth every 8 (eight) hours as needed for diarrhea.   More than a month    meclizine (ANTIVERT) 25 mg tablet Take 25 mg by mouth daily as needed.   Unknown    pantoprazole (PROTONIX) 20 mg EC tablet Take 20 mg by mouth daily.       sodium chloride (OCEAN) 0.65 % nasal spray Administer 2 sprays into each nostril daily.          Home medications were personally reviewed.    ALLERGIES      Aspirin, Ibuprofen, Iodine, Iodine and iodide containing products, Morphine, Penicillins, Sulfa (sulfonamide antibiotics), Clindamycin, Hydrochlorothiazide, Oxycodone, Sulfamethoxazole-trimethoprim, and Latex    FAMILY HISTORY      Family History   Problem Relation Age of Onset    Heart disease Biological Brother         stent    Breast cancer Niece     Cervical cancer Neg Hx     Uterine cancer Neg Hx     Colon cancer Neg Hx     Ovarian cancer Neg Hx        SOCIAL HISTORY      Social History     Socioeconomic History    Marital status:      Spouse name: None    Number of children: None    Years of education: None    Highest education level: None   Tobacco Use    Smoking status: Former     Types: Cigarettes     Quit date: 9/15/2005     Years since quittin.9    Smokeless tobacco: Never    Tobacco comments:     Would smoke 1/2 of 1/2 PPD, quit in    Vaping Use    Vaping Use: Never used   Substance and Sexual Activity    Alcohol use: Never    Drug use: Never    Sexual activity: Not Currently     Birth control/protection: Post-menopausal     Social Determinants of Health     Food Insecurity: No Food  Insecurity (8/8/2024)    Hunger Vital Sign     Worried About Running Out of Food in the Last Year: Never true     Ran Out of Food in the Last Year: Never true   Transportation Needs: No Transportation Needs (8/9/2024)    PRAPARE - Transportation     Lack of Transportation (Medical): No     Lack of Transportation (Non-Medical): No   Housing Stability: Low Risk  (8/9/2024)    Housing Stability Vital Sign     Unable to Pay for Housing in the Last Year: No     Number of Places Lived in the Last Year: 1     Unstable Housing in the Last Year: No       REVIEW OF SYSTEMS      14 point ROS negative unless stated per HPI    PHYSICAL EXAMINATION      Temp:  [36.5 °C (97.7 °F)-36.8 °C (98.2 °F)] 36.6 °C (97.8 °F)  Heart Rate:  [74-91] 85  Resp:  [16] 16  BP: (103-122)/(51-58) 122/58  Body mass index is 18.53 kg/m².    General appearance - alert, chronically ill in appearance  Eyes - pupils equal and reactive, extraocular eye movements intact, sclera anicteric  Mouth - mucous membranes moist, pharynx normal without lesions  Chest - no evidence of resp distress  Heart - normal rate, regular rhythm, normal S1, S2, no r/m/g  Abdomen - soft, nontender. R inguinal pain   Rectal - deferred  Skin - normal coloration and turgor, no rashes, no suspicious skin lesions noted    Vomitus on 8/15:       LABS / IMAGING / EKG        Labs  Reviewed.  Results from last 7 days   Lab Units 08/16/24  0630 08/15/24  0901 08/13/24  0958   SODIUM mEQ/L 140 139 141   POTASSIUM mEQ/L 4.4 4.3 3.8   CHLORIDE mEQ/L 105 103 106   CO2 mEQ/L 29 26 29   BUN mg/dL 45* 47* 23   CREATININE mg/dL 1.4* 1.4* 1.0   CALCIUM mg/dL 8.8 9.0 9.4   ALBUMIN g/dL  --  3.5  --    BILIRUBIN TOTAL mg/dL  --  0.3  --    ALK PHOS IU/L  --  50  --    ALT IU/L  --  14  --    AST IU/L  --  18  --    GLUCOSE mg/dL 83 99 113*     Results from last 7 days   Lab Units 08/15/24  0901   MAGNESIUM mg/dL 2.3     Results from last 7 days   Lab Units 08/16/24  0608 08/15/24  7009  08/15/24  0901   WBC K/uL 8.50 10.39 12.22*   HEMOGLOBIN g/dL 7.7* 8.7* 8.7*   HEMATOCRIT % 26.3* 29.9* 28.7*   PLATELETS K/uL 127* 203 205   DIFF TYPE  Auto Auto Auto   NRBC % 0.0 0.0 0.0   IMM GRANULOCYTES % 0.4 0.4 0.3   NEUTROPHILS % 65.5 72.7 79.6   LYMPHOCYTES % 24.2 17.8 11.4   MONOCYTES % 5.9 7.6 7.9   EOSINOPHILS % 3.5 1.0 0.2   BASOPHILS % 0.5 0.5 0.6   IMM GRANUCOCYTES ABS K/uL 0.03 0.04 0.04   MONO ABS AUTO K/uL 0.50 0.79 0.97*   EOS ABS AUTO K/uL 0.30 0.10 0.02*   BASO ABS AUTO K/uL 0.04 0.05 0.07         Imaging  Reviewed.    ASSESSMENT AND PLAN      No new Assessment & Plan notes have been filed under this hospital service since the last note was generated.  Service: Gastroenterology    #Vomitus  Patient does not have any evidence for active coffee-ground emesis. She does have risk factors for stress ulcer development and should be on ppx dosing of pantoprazole while hospitalized. Her hgb drop is likely 2/2 hematoma development from her angiography, not any active GIB.   -Favor decreasing pantoprazole to 40mg qd   -Discuss with vascular surgery regarding resuming plavix iso recent hematoma development and downtrending hgb    GI will sign off.    Please see attending attestation for final recommendations.    Mike Gipson MD  Gastroenterology Fellow PGY-4  Pager #[phone]       VTE Assessment: Padua    Code Status: Full Code  Estimated discharge date: 8/16/2024

## 2024-08-17 LAB
ALBUMIN SERPL-MCNC: 3.5 G/DL (ref 3.5–5.7)
ALP SERPL-CCNC: 55 IU/L (ref 34–125)
ALT SERPL-CCNC: 12 IU/L (ref 7–52)
ANION GAP SERPL CALC-SCNC: 7 MEQ/L (ref 3–15)
AST SERPL-CCNC: 14 IU/L (ref 13–39)
BACTERIA URNS QL MICRO: ABNORMAL /HPF
BASOPHILS # BLD: 0.01 K/UL (ref 0.01–0.1)
BASOPHILS # BLD: 0.03 K/UL (ref 0.01–0.1)
BASOPHILS NFR BLD: 0.1 %
BASOPHILS NFR BLD: 0.2 %
BILIRUB SERPL-MCNC: 0.3 MG/DL (ref 0.3–1.2)
BILIRUB UR QL STRIP.AUTO: NEGATIVE MG/DL
BUN SERPL-MCNC: 37 MG/DL (ref 7–25)
CALCIUM SERPL-MCNC: 9.2 MG/DL (ref 8.6–10.3)
CHLORIDE SERPL-SCNC: 106 MEQ/L (ref 98–107)
CLARITY UR REFRACT.AUTO: CLEAR
CO2 SERPL-SCNC: 25 MEQ/L (ref 21–31)
COLOR UR AUTO: COLORLESS
CREAT SERPL-MCNC: 1.1 MG/DL (ref 0.6–1.2)
DIFFERENTIAL METHOD BLD: ABNORMAL
DIFFERENTIAL METHOD BLD: ABNORMAL
EGFRCR SERPLBLD CKD-EPI 2021: 56.6 ML/MIN/1.73M*2
EOSINOPHIL # BLD: 0 K/UL (ref 0.04–0.36)
EOSINOPHIL # BLD: 0.03 K/UL (ref 0.04–0.36)
EOSINOPHIL NFR BLD: 0 %
EOSINOPHIL NFR BLD: 0.2 %
ERYTHROCYTE [DISTWIDTH] IN BLOOD BY AUTOMATED COUNT: 19.2 % (ref 11.7–14.4)
ERYTHROCYTE [DISTWIDTH] IN BLOOD BY AUTOMATED COUNT: 19.3 % (ref 11.7–14.4)
GLUCOSE SERPL-MCNC: 89 MG/DL (ref 70–99)
GLUCOSE UR STRIP.AUTO-MCNC: NEGATIVE MG/DL
HCT VFR BLD AUTO: 23.7 % (ref 35–45)
HCT VFR BLD AUTO: 26.7 % (ref 35–45)
HGB BLD-MCNC: 7.1 G/DL (ref 11.8–15.7)
HGB BLD-MCNC: 8 G/DL (ref 11.8–15.7)
HGB UR QL STRIP.AUTO: NEGATIVE
HYALINE CASTS #/AREA URNS LPF: ABNORMAL /LPF
IMM GRANULOCYTES # BLD AUTO: 0.06 K/UL (ref 0–0.08)
IMM GRANULOCYTES # BLD AUTO: 0.06 K/UL (ref 0–0.08)
IMM GRANULOCYTES NFR BLD AUTO: 0.5 %
IMM GRANULOCYTES NFR BLD AUTO: 0.5 %
KETONES UR STRIP.AUTO-MCNC: NEGATIVE MG/DL
LEUKOCYTE ESTERASE UR QL STRIP.AUTO: NEGATIVE
LYMPHOCYTES # BLD: 1.1 K/UL (ref 1.2–3.5)
LYMPHOCYTES # BLD: 1.87 K/UL (ref 1.2–3.5)
LYMPHOCYTES NFR BLD: 15.4 %
LYMPHOCYTES NFR BLD: 9.6 %
MCH RBC QN AUTO: 24.3 PG (ref 28–33.2)
MCH RBC QN AUTO: 24.4 PG (ref 28–33.2)
MCHC RBC AUTO-ENTMCNC: 30 G/DL (ref 32.2–35.5)
MCHC RBC AUTO-ENTMCNC: 30 G/DL (ref 32.2–35.5)
MCV RBC AUTO: 81.2 FL (ref 83–98)
MCV RBC AUTO: 81.4 FL (ref 83–98)
MONOCYTES # BLD: 0.61 K/UL (ref 0.28–0.8)
MONOCYTES # BLD: 1.31 K/UL (ref 0.28–0.8)
MONOCYTES NFR BLD: 10.8 %
MONOCYTES NFR BLD: 5.3 %
NEUTROPHILS # BLD: 8.86 K/UL (ref 1.7–7)
NEUTROPHILS # BLD: 9.72 K/UL (ref 1.7–7)
NEUTS SEG NFR BLD: 72.9 %
NEUTS SEG NFR BLD: 84.5 %
NITRITE UR QL STRIP.AUTO: NEGATIVE
NRBC BLD-RTO: 0 %
NRBC BLD-RTO: 0 %
PDW BLD AUTO: 11.5 FL (ref 9.4–12.3)
PDW BLD AUTO: 12 FL (ref 9.4–12.3)
PH UR STRIP.AUTO: 6.5 [PH]
PLATELET # BLD AUTO: 171 K/UL (ref 150–369)
PLATELET # BLD AUTO: 171 K/UL (ref 150–369)
POTASSIUM SERPL-SCNC: 4.6 MEQ/L (ref 3.5–5.1)
PROT SERPL-MCNC: 6.4 G/DL (ref 6–8.2)
PROT UR QL STRIP.AUTO: ABNORMAL
RBC # BLD AUTO: 2.91 M/UL (ref 3.93–5.22)
RBC # BLD AUTO: 3.29 M/UL (ref 3.93–5.22)
RBC #/AREA URNS HPF: ABNORMAL /HPF
SODIUM SERPL-SCNC: 138 MEQ/L (ref 136–145)
SP GR UR REFRACT.AUTO: 1.03
SQUAMOUS URNS QL MICRO: ABNORMAL /HPF
UROBILINOGEN UR STRIP-ACNC: 0.2 EU/DL
WBC # BLD AUTO: 11.5 K/UL (ref 3.8–10.5)
WBC # BLD AUTO: 12.16 K/UL (ref 3.8–10.5)
WBC #/AREA URNS HPF: ABNORMAL /HPF

## 2024-08-17 PROCEDURE — 81003 URINALYSIS AUTO W/O SCOPE: CPT | Performed by: NURSE PRACTITIONER

## 2024-08-17 PROCEDURE — 63700000 HC SELF-ADMINISTRABLE DRUG: Performed by: INTERNAL MEDICINE

## 2024-08-17 PROCEDURE — 21400000 HC ROOM AND CARE CCU/INTERMEDIATE

## 2024-08-17 PROCEDURE — 80053 COMPREHEN METABOLIC PANEL: CPT | Performed by: INTERNAL MEDICINE

## 2024-08-17 PROCEDURE — 81001 URINALYSIS AUTO W/SCOPE: CPT | Performed by: NURSE PRACTITIONER

## 2024-08-17 PROCEDURE — 85025 COMPLETE CBC W/AUTO DIFF WBC: CPT | Performed by: INTERNAL MEDICINE

## 2024-08-17 PROCEDURE — 99233 SBSQ HOSP IP/OBS HIGH 50: CPT | Performed by: INTERNAL MEDICINE

## 2024-08-17 PROCEDURE — 36415 COLL VENOUS BLD VENIPUNCTURE: CPT | Performed by: INTERNAL MEDICINE

## 2024-08-17 PROCEDURE — 63600000 HC DRUGS/DETAIL CODE: Mod: JZ | Performed by: INTERNAL MEDICINE

## 2024-08-17 RX ORDER — CLOPIDOGREL BISULFATE 75 MG/1
75 TABLET ORAL DAILY
Status: DISCONTINUED | OUTPATIENT
Start: 2024-08-17 | End: 2024-09-03 | Stop reason: HOSPADM

## 2024-08-17 RX ADMIN — PANTOPRAZOLE SODIUM 40 MG: 40 INJECTION, POWDER, LYOPHILIZED, FOR SOLUTION INTRAVENOUS at 07:39

## 2024-08-17 RX ADMIN — PANTOPRAZOLE SODIUM 40 MG: 40 INJECTION, POWDER, LYOPHILIZED, FOR SOLUTION INTRAVENOUS at 22:10

## 2024-08-17 RX ADMIN — SENNOSIDES 2 TABLET: 8.6 TABLET, FILM COATED ORAL at 07:39

## 2024-08-17 RX ADMIN — WHITE PETROLATUM: 1.75 OINTMENT TOPICAL at 22:11

## 2024-08-17 RX ADMIN — ACETAMINOPHEN 975 MG: 325 TABLET ORAL at 22:44

## 2024-08-17 RX ADMIN — MINERAL OIL, WHITE PETROLATUM: .03; .94 OINTMENT OPHTHALMIC at 22:11

## 2024-08-17 RX ADMIN — GLYCERIN 1 DROP: .002; .002; .01 SOLUTION/ DROPS OPHTHALMIC at 22:11

## 2024-08-17 RX ADMIN — DEXTRAN 70, GLYCERIN, HYPROMELLOSE 1 DROP: 1; 2; 3 SOLUTION/ DROPS OPHTHALMIC at 14:22

## 2024-08-17 RX ADMIN — BENZOCAINE 6 MG-MENTHOL 10 MG LOZENGES 1 LOZENGE: at 17:36

## 2024-08-17 RX ADMIN — SILDENAFIL 20 MG: 20 TABLET, FILM COATED ORAL at 07:38

## 2024-08-17 RX ADMIN — BENZONATATE 100 MG: 100 CAPSULE ORAL at 17:37

## 2024-08-17 RX ADMIN — GLYCERIN 1 DROP: .002; .002; .01 SOLUTION/ DROPS OPHTHALMIC at 08:13

## 2024-08-17 RX ADMIN — GLYCERIN 1 DROP: .002; .002; .01 SOLUTION/ DROPS OPHTHALMIC at 14:23

## 2024-08-17 RX ADMIN — ACETAMINOPHEN 975 MG: 325 TABLET ORAL at 11:53

## 2024-08-17 RX ADMIN — LIDOCAINE 4% 1 PATCH: 40 PATCH TOPICAL at 07:38

## 2024-08-17 RX ADMIN — GLYCERIN 1 DROP: .002; .002; .01 SOLUTION/ DROPS OPHTHALMIC at 17:39

## 2024-08-17 RX ADMIN — CYCLOBENZAPRINE HYDROCHLORIDE 5 MG: 5 TABLET, FILM COATED ORAL at 07:39

## 2024-08-17 RX ADMIN — CYCLOBENZAPRINE HYDROCHLORIDE 5 MG: 5 TABLET, FILM COATED ORAL at 17:36

## 2024-08-17 RX ADMIN — SILDENAFIL 20 MG: 20 TABLET, FILM COATED ORAL at 22:10

## 2024-08-17 RX ADMIN — ETHACRYNIC ACID 50 MG: 25 TABLET ORAL at 09:32

## 2024-08-17 RX ADMIN — ACETAMINOPHEN 975 MG: 325 TABLET ORAL at 05:38

## 2024-08-17 RX ADMIN — FENTANYL CITRATE 12.5 MCG: 0.05 INJECTION, SOLUTION INTRAMUSCULAR; INTRAVENOUS at 20:16

## 2024-08-17 RX ADMIN — DEXTRAN 70, GLYCERIN, HYPROMELLOSE 1 DROP: 1; 2; 3 SOLUTION/ DROPS OPHTHALMIC at 17:39

## 2024-08-17 RX ADMIN — WHITE PETROLATUM: 1.75 OINTMENT TOPICAL at 08:13

## 2024-08-17 RX ADMIN — DEXTRAN 70, GLYCERIN, HYPROMELLOSE 1 DROP: 1; 2; 3 SOLUTION/ DROPS OPHTHALMIC at 08:13

## 2024-08-17 RX ADMIN — GLYCERIN 1 DROP: .002; .002; .01 SOLUTION/ DROPS OPHTHALMIC at 05:40

## 2024-08-17 RX ADMIN — LEVOTHYROXINE SODIUM 100 MCG: 0.1 TABLET ORAL at 05:39

## 2024-08-17 RX ADMIN — SILDENAFIL 20 MG: 20 TABLET, FILM COATED ORAL at 14:22

## 2024-08-17 RX ADMIN — SALINE NASAL SPRAY 2 SPRAY: 1.5 SOLUTION NASAL at 07:41

## 2024-08-17 RX ADMIN — ACETAMINOPHEN 975 MG: 325 TABLET ORAL at 17:36

## 2024-08-17 RX ADMIN — ONDANSETRON 4 MG: 4 TABLET, ORALLY DISINTEGRATING ORAL at 08:20

## 2024-08-17 RX ADMIN — CYANOCOBALAMIN TAB 1000 MCG 1000 MCG: 1000 TAB at 07:39

## 2024-08-17 RX ADMIN — CLOPIDOGREL 75 MG: 75 TABLET ORAL at 14:22

## 2024-08-17 RX ADMIN — BENZONATATE 100 MG: 100 CAPSULE ORAL at 07:39

## 2024-08-17 RX ADMIN — AMLODIPINE BESYLATE 10 MG: 10 TABLET ORAL at 07:39

## 2024-08-17 RX ADMIN — DEXTRAN 70, GLYCERIN, HYPROMELLOSE 1 DROP: 1; 2; 3 SOLUTION/ DROPS OPHTHALMIC at 22:11

## 2024-08-17 ASSESSMENT — COGNITIVE AND FUNCTIONAL STATUS - GENERAL
WALKING IN HOSPITAL ROOM: 3 - A LITTLE
MOVING TO AND FROM BED TO CHAIR: 3 - A LITTLE
WALKING IN HOSPITAL ROOM: 3 - A LITTLE
STANDING UP FROM CHAIR USING ARMS: 3 - A LITTLE
CLIMB 3 TO 5 STEPS WITH RAILING: 2 - A LOT
MOVING TO AND FROM BED TO CHAIR: 3 - A LITTLE
CLIMB 3 TO 5 STEPS WITH RAILING: 2 - A LOT
STANDING UP FROM CHAIR USING ARMS: 3 - A LITTLE

## 2024-08-17 NOTE — PROGRESS NOTES
Hospital Medicine     Daily Progress Note       SUBJECTIVE   Interval History: seen sitting upright eating breakfast today, looks much improved from yesterday discussed fentanyl option again, she would like team to discuss with her dtr prior to trying it.    Spoke with Dtr this morning to discuss hospital course, pain management and discharge planning.  Family on board for trial of fentanyl       OBJECTIVE      Vital signs in last 24 hours:  Temp:  [36.6 °C (97.8 °F)-36.8 °C (98.3 °F)] 36.8 °C (98.2 °F)  Heart Rate:  [] 105  Resp:  [14-28] 28  BP: (114-130)/(56-81) 118/56    Intake/Output Summary (Last 24 hours) at 8/17/2024 1155  Last data filed at 8/17/2024 1000  Gross per 24 hour   Intake 1080 ml   Output 900 ml   Net 180 ml       PHYSICAL EXAMINATION      Physical Exam  Vitals and nursing note reviewed.   Constitutional:       General: She is not in acute distress.     Comments: Chronically ill-appearing, underweight   HENT:      Head: Normocephalic. Erythematous, taught skin  Eyes:      Conjunctiva/sclera: Conjunctivae normal.   Cardiovascular:      Rate and Rhythm: Normal rate and regular rhythm.   Pulmonary:      Effort: Pulmonary effort is normal. No respiratory distress.      Breath sounds: Normal breath sounds.   Abdominal:      Unable to palpate abd grimaces to even slight touch.   Musculoskeletal: Hand deformities-chronic     Cervical back: Neck supple.      Right lower leg: No edema.      Left lower leg: No edema.   Skin:     General: Skin is warm and dry.   Neurological:      Mental Status: She is alert and oriented to person, place, and time.   Psychiatric:         Behavior: Behavior normal.           LINES, CATHETERS, DRAINS, AIRWAYS, AND WOUNDS   Lines, Drains, and Airways:  Wounds (agree with documentation and present on admission):  Peripheral IV (Adult) 08/08/24 Anterior;Left;Proximal Forearm (Active)   Number of days: 4       Wound Anterior;Right Toe (2nd) (Active)   Number of days: 4        Wound Anterior;Left Toe (Great) (Active)   Number of days: 4       Wound Anterior;Left Toe (2nd) (Active)   Number of days: 4         Comments:    LABS / IMAGING / TELE      Labs  Results from last 7 days   Lab Units 08/17/24  0847   SODIUM mEQ/L 138   POTASSIUM mEQ/L 4.6   CHLORIDE mEQ/L 106   CO2 mEQ/L 25   BUN mg/dL 37*   CREATININE mg/dL 1.1   GLUCOSE mg/dL 89   CALCIUM mg/dL 9.2     Results from last 7 days   Lab Units 08/17/24  0847   WBC K/uL 11.50*   HEMOGLOBIN g/dL 8.0*   HEMATOCRIT % 26.7*   PLATELETS K/uL 171           Imaging  No new images today      ECG/Telemetry  Reviewed    ASSESSMENT AND PLAN      Retroperitoneal hematoma  Assessment & Plan  Pt endorsing abdominal pain morning after angio that has progressed since then with inability to tolerate PO. Did have an initial hg drop thus CTAP w/o con ordered and suggestive of RP hematoma with some mass effect on bladder. On 8/16 worsening pain with radiation to R flank. CT angio w/ stable hematoma, no active signs of bleeding   -vascular on board   -pending AM labs will resume plavix today  -Q8 CBC, keep TS active    Abdominal pain  Assessment & Plan  Pt reporting lower quadrant abdominal pain day after angio.  No evidence or bruising at fem access site or palpable masses, but CT demonstrated R RP hematoma.  Repeat CTA 8/16 showed stable hematoma w/o signs of active bleed, however did show mass effect on bladder as well as significant constipation.  Work up including LFTs, lipase unremarkable.  -intermittent leukocytosis like 2.2 to solumedrol pre treatment for contrast allergy.   -Bowel reg ordered,--has not be utilizing much   -encourage use  -pain management as below    -pt with multiple allergies, offered fentanyl pushes as a bridge to fentanyl patch however declined.     Wound of lower extremity  Assessment & Plan  bilateral lower extremity wounds and bilateral second digit dry gangrene  Podiatry consult appreciated  Advised PVR, arterial US;  Ultrasound ENRRIQUE with various deficiencies, but seems worse in the left PT at 0.62.  Arterial ultrasound with hemodynamically significant stenosis in the bilateral popliteal arteries and distal fem  Vascular surgery consult appreciated  s/p LLE angio, balloon angio of distal SFA, TP trunk, DCB angio of popliteal artery on 8/14  Now on Plavix 75mg daily. She has an allergy to aspirin.  Local wound care as per pods    Interstitial lung disease (CMS/HCC)  Assessment & Plan  DPLD  Does not appear to be in exacerbation for this  Has not tolerated trials of Mycophenolate. On Tocilizumab.   On 2L O2-NC chronically    Pain of both eyes  Assessment & Plan  Late 8/10 noted right eye pain,, swelling, blurry vision  Seen by ophthalmology  No concern for preseptal or orbital cellulitis  CT orbits: No evidence of acute process involving the orbits  Carotid US negative for HD significant stenosis  Advised various drops, ointments for her eyes--> per MAR has been declining eye drops as recommended by ortho  Advised stopping Lyrica  Both blurred vision and eye pain have resolved    Chest pain  Assessment & Plan  Chest pain has been ongoing for months, chronic at this point  Exam and history suggest MSK  Pain control is difficult as patient has various allergies, including morphine, oxycodone, NSAIDs.  She has a lidocaine patch, she does not think it is helping much  Consulted pain management; Advised addition of Lyrica, Flexeril, tramadol  Currently, ophthalmology recommended cessation of Lyrica given concerns of it causing her blurry vision/dry eyes  Patient is very apprehensive to try any new medications, she could not tolerate tramadol  Pall on board, pt has tolerated fentanyl in the passed offered today but declined    Pulmonary nodule  Assessment & Plan  Left lower lobe nodule, s/p ION navigational bronchoscopy with biopsy showing a small focus of atypical glands.     -Pulm input appreciated  -outpatient follow up with pulmonary  regarding biopsy findings    Acute on chronic heart failure with preserved ejection fraction (CMS/[facility])  Assessment & Plan  EF 65%  Known to Cardiology Dr. Arvizu, in-house consult placed  S/p IV ethacrynic acid, now back on PO  Continue pHTN meds; opsumit, revatio  CHF order set, daily weights, salt/fluid restriction, strict Is&Os      Scleroderma (CMS/[facility])  Assessment & Plan  Again, chronic  Tocilizumab every 28 days    Pulmonary hypertension (CMS/[facility])  Assessment & Plan  Chronic, extensive history    Continue home med macitentan  On sildenafil  Diuretics held yesterday given c/f active bleeding, none seen on CTA, hg remains stable, given ongoing dyspnea on exertion resume diuretics today    Essential (primary) hypertension  Assessment & Plan  Pressure stable here, continue home medications         VTE Assessment: Padua    VTE Prophylaxis:  Current anticoagulants:    None      Code Status: Full Code      Estimated Discharge Date: 8/19/2024       Disposition Planning: pending clinical course     Simon Cano DO  8/17/2024

## 2024-08-17 NOTE — PLAN OF CARE
Plan of Care Review  Plan of Care Reviewed With: patient  Progress: no change  Outcome Evaluation: Patient had vital signs stable thoughout the shift. Received Tylenol and flexeril for pain relieve. She walked to the bathroom but became very dyspneic of exertion. Patient had to be reminded several times of fluid restriction.

## 2024-08-17 NOTE — PLAN OF CARE
Problem: Adult Inpatient Plan of Care  Goal: Plan of Care Review  Outcome: Progressing  Flowsheets (Taken 8/17/2024 1047)  Progress: no change  Outcome Evaluation: VSS, lidocaine patches, heating pad, tylenol and flexeril for pain.  pt aware of 2000ml fluid restriction  Plan of Care Reviewed With: patient  Goal: Patient-Specific Goal (Individualized)  Outcome: Progressing  Flowsheets (Taken 8/17/2024 1047)  Patient/Family-Specific Goals (Include Timeframe): pt will have less pain this shift  Individualized Care Needs: assist x 1 to bathroom  Anxieties, Fears or Concerns: concenred about pain and nausea   Plan of Care Review  Plan of Care Reviewed With: patient  Progress: no change  Outcome Evaluation: VSS, lidocaine patches, heating pad, tylenol and flexeril for pain.  pt aware of 2000ml fluid restriction

## 2024-08-18 LAB
ALBUMIN SERPL-MCNC: 3.4 G/DL (ref 3.5–5.7)
ALP SERPL-CCNC: 52 IU/L (ref 34–125)
ALT SERPL-CCNC: 11 IU/L (ref 7–52)
ANION GAP SERPL CALC-SCNC: 8 MEQ/L (ref 3–15)
ANISOCYTOSIS BLD QL SMEAR: ABNORMAL
AST SERPL-CCNC: 14 IU/L (ref 13–39)
BASOPHILS # BLD: 0.04 K/UL (ref 0.01–0.1)
BASOPHILS # BLD: 0.04 K/UL (ref 0.01–0.1)
BASOPHILS # BLD: 0.05 K/UL (ref 0.01–0.1)
BASOPHILS NFR BLD: 0.4 %
BASOPHILS NFR BLD: 0.4 %
BASOPHILS NFR BLD: 0.5 %
BILIRUB SERPL-MCNC: 0.3 MG/DL (ref 0.3–1.2)
BUN SERPL-MCNC: 30 MG/DL (ref 7–25)
CALCIUM SERPL-MCNC: 9 MG/DL (ref 8.6–10.3)
CHLORIDE SERPL-SCNC: 109 MEQ/L (ref 98–107)
CO2 SERPL-SCNC: 23 MEQ/L (ref 21–31)
CREAT SERPL-MCNC: 1.1 MG/DL (ref 0.6–1.2)
DACRYOCYTES BLD QL SMEAR: ABNORMAL
DIFFERENTIAL METHOD BLD: ABNORMAL
EGFRCR SERPLBLD CKD-EPI 2021: 56.6 ML/MIN/1.73M*2
EOSINOPHIL # BLD: 0.18 K/UL (ref 0.04–0.36)
EOSINOPHIL # BLD: 0.26 K/UL (ref 0.04–0.36)
EOSINOPHIL # BLD: 0.32 K/UL (ref 0.04–0.36)
EOSINOPHIL NFR BLD: 1.7 %
EOSINOPHIL NFR BLD: 2.7 %
EOSINOPHIL NFR BLD: 3 %
ERYTHROCYTE [DISTWIDTH] IN BLOOD BY AUTOMATED COUNT: 19.5 % (ref 11.7–14.4)
ERYTHROCYTE [DISTWIDTH] IN BLOOD BY AUTOMATED COUNT: 19.5 % (ref 11.7–14.4)
ERYTHROCYTE [DISTWIDTH] IN BLOOD BY AUTOMATED COUNT: 19.6 % (ref 11.7–14.4)
GLUCOSE SERPL-MCNC: 101 MG/DL (ref 70–99)
HCT VFR BLD AUTO: 23.7 % (ref 35–45)
HCT VFR BLD AUTO: 24.5 % (ref 35–45)
HCT VFR BLD AUTO: 27.1 % (ref 35–45)
HGB BLD-MCNC: 7.2 G/DL (ref 11.8–15.7)
HGB BLD-MCNC: 7.2 G/DL (ref 11.8–15.7)
HGB BLD-MCNC: 7.9 G/DL (ref 11.8–15.7)
HYPOCHROMIA BLD QL SMEAR: ABNORMAL
IMM GRANULOCYTES # BLD AUTO: 0.04 K/UL (ref 0–0.08)
IMM GRANULOCYTES # BLD AUTO: 0.05 K/UL (ref 0–0.08)
IMM GRANULOCYTES # BLD AUTO: 0.06 K/UL (ref 0–0.08)
IMM GRANULOCYTES NFR BLD AUTO: 0.4 %
IMM GRANULOCYTES NFR BLD AUTO: 0.5 %
IMM GRANULOCYTES NFR BLD AUTO: 0.6 %
LYMPHOCYTES # BLD: 1.93 K/UL (ref 1.2–3.5)
LYMPHOCYTES # BLD: 2.25 K/UL (ref 1.2–3.5)
LYMPHOCYTES # BLD: 2.34 K/UL (ref 1.2–3.5)
LYMPHOCYTES NFR BLD: 18.2 %
LYMPHOCYTES NFR BLD: 21 %
LYMPHOCYTES NFR BLD: 24.2 %
MCH RBC QN AUTO: 24.2 PG (ref 28–33.2)
MCH RBC QN AUTO: 24.5 PG (ref 28–33.2)
MCH RBC QN AUTO: 24.7 PG (ref 28–33.2)
MCHC RBC AUTO-ENTMCNC: 29.2 G/DL (ref 32.2–35.5)
MCHC RBC AUTO-ENTMCNC: 29.4 G/DL (ref 32.2–35.5)
MCHC RBC AUTO-ENTMCNC: 30.4 G/DL (ref 32.2–35.5)
MCV RBC AUTO: 81.4 FL (ref 83–98)
MCV RBC AUTO: 83.1 FL (ref 83–98)
MCV RBC AUTO: 83.3 FL (ref 83–98)
MICROCYTES BLD QL SMEAR: ABNORMAL
MONOCYTES # BLD: 0.67 K/UL (ref 0.28–0.8)
MONOCYTES # BLD: 0.72 K/UL (ref 0.28–0.8)
MONOCYTES # BLD: 1.03 K/UL (ref 0.28–0.8)
MONOCYTES NFR BLD: 6.8 %
MONOCYTES NFR BLD: 6.9 %
MONOCYTES NFR BLD: 9.6 %
NEUTROPHILS # BLD: 6.3 K/UL (ref 1.7–7)
NEUTROPHILS # BLD: 7.16 K/UL (ref 1.7–7)
NEUTROPHILS # BLD: 7.55 K/UL (ref 1.7–7)
NEUTS SEG NFR BLD: 65.4 %
NEUTS SEG NFR BLD: 66.6 %
NEUTS SEG NFR BLD: 71.1 %
NRBC BLD-RTO: 0 %
OVALOCYTES BLD QL SMEAR: ABNORMAL
PDW BLD AUTO: 11.6 FL (ref 9.4–12.3)
PDW BLD AUTO: 11.6 FL (ref 9.4–12.3)
PDW BLD AUTO: 12.2 FL (ref 9.4–12.3)
PLAT MORPH BLD: NORMAL
PLATELET # BLD AUTO: 174 K/UL (ref 150–369)
PLATELET # BLD AUTO: 186 K/UL (ref 150–369)
PLATELET # BLD AUTO: 192 K/UL (ref 150–369)
PLATELET # BLD EST: ABNORMAL 10*3/UL
POIKILOCYTOSIS BLD QL SMEAR: ABNORMAL
POLYCHROMASIA BLD QL SMEAR: ABNORMAL
POTASSIUM SERPL-SCNC: 4.2 MEQ/L (ref 3.5–5.1)
PROT SERPL-MCNC: 6.2 G/DL (ref 6–8.2)
RBC # BLD AUTO: 2.91 M/UL (ref 3.93–5.22)
RBC # BLD AUTO: 2.94 M/UL (ref 3.93–5.22)
RBC # BLD AUTO: 3.26 M/UL (ref 3.93–5.22)
SODIUM SERPL-SCNC: 140 MEQ/L (ref 136–145)
WBC # BLD AUTO: 10.61 K/UL (ref 3.8–10.5)
WBC # BLD AUTO: 10.73 K/UL (ref 3.8–10.5)
WBC # BLD AUTO: 9.65 K/UL (ref 3.8–10.5)

## 2024-08-18 PROCEDURE — 63700000 HC SELF-ADMINISTRABLE DRUG: Performed by: INTERNAL MEDICINE

## 2024-08-18 PROCEDURE — 36415 COLL VENOUS BLD VENIPUNCTURE: CPT | Performed by: INTERNAL MEDICINE

## 2024-08-18 PROCEDURE — 80053 COMPREHEN METABOLIC PANEL: CPT | Performed by: INTERNAL MEDICINE

## 2024-08-18 PROCEDURE — 85025 COMPLETE CBC W/AUTO DIFF WBC: CPT | Performed by: INTERNAL MEDICINE

## 2024-08-18 PROCEDURE — 21400000 HC ROOM AND CARE CCU/INTERMEDIATE

## 2024-08-18 PROCEDURE — 99233 SBSQ HOSP IP/OBS HIGH 50: CPT | Performed by: INTERNAL MEDICINE

## 2024-08-18 PROCEDURE — 63600000 HC DRUGS/DETAIL CODE: Mod: JZ | Performed by: INTERNAL MEDICINE

## 2024-08-18 RX ORDER — PANTOPRAZOLE SODIUM 20 MG/1
20 TABLET, DELAYED RELEASE ORAL DAILY
Status: DISCONTINUED | OUTPATIENT
Start: 2024-08-18 | End: 2024-08-18

## 2024-08-18 RX ORDER — PANTOPRAZOLE SODIUM 20 MG/1
20 TABLET, DELAYED RELEASE ORAL DAILY
Status: DISCONTINUED | OUTPATIENT
Start: 2024-08-19 | End: 2024-08-22

## 2024-08-18 RX ADMIN — CYANOCOBALAMIN TAB 1000 MCG 1000 MCG: 1000 TAB at 08:33

## 2024-08-18 RX ADMIN — DEXTRAN 70, GLYCERIN, HYPROMELLOSE 1 DROP: 1; 2; 3 SOLUTION/ DROPS OPHTHALMIC at 08:33

## 2024-08-18 RX ADMIN — WHITE PETROLATUM: 1.75 OINTMENT TOPICAL at 20:48

## 2024-08-18 RX ADMIN — LIDOCAINE 4% 1 PATCH: 40 PATCH TOPICAL at 08:31

## 2024-08-18 RX ADMIN — GLYCERIN 1 DROP: .002; .002; .01 SOLUTION/ DROPS OPHTHALMIC at 08:33

## 2024-08-18 RX ADMIN — GLYCERIN 1 DROP: .002; .002; .01 SOLUTION/ DROPS OPHTHALMIC at 18:31

## 2024-08-18 RX ADMIN — Medication 3 MG: at 02:07

## 2024-08-18 RX ADMIN — SILDENAFIL 20 MG: 20 TABLET, FILM COATED ORAL at 20:48

## 2024-08-18 RX ADMIN — DEXTRAN 70, GLYCERIN, HYPROMELLOSE 1 DROP: 1; 2; 3 SOLUTION/ DROPS OPHTHALMIC at 18:31

## 2024-08-18 RX ADMIN — CYCLOBENZAPRINE HYDROCHLORIDE 5 MG: 5 TABLET, FILM COATED ORAL at 11:31

## 2024-08-18 RX ADMIN — ACETAMINOPHEN 975 MG: 325 TABLET ORAL at 18:30

## 2024-08-18 RX ADMIN — ACETAMINOPHEN 975 MG: 325 TABLET ORAL at 06:25

## 2024-08-18 RX ADMIN — GLYCERIN 1 DROP: .002; .002; .01 SOLUTION/ DROPS OPHTHALMIC at 05:06

## 2024-08-18 RX ADMIN — WHITE PETROLATUM: 1.75 OINTMENT TOPICAL at 08:34

## 2024-08-18 RX ADMIN — MINERAL OIL, WHITE PETROLATUM: .03; .94 OINTMENT OPHTHALMIC at 20:49

## 2024-08-18 RX ADMIN — DEXTRAN 70, GLYCERIN, HYPROMELLOSE 1 DROP: 1; 2; 3 SOLUTION/ DROPS OPHTHALMIC at 14:45

## 2024-08-18 RX ADMIN — ACETAMINOPHEN 975 MG: 325 TABLET ORAL at 11:31

## 2024-08-18 RX ADMIN — Medication 3 MG: at 21:37

## 2024-08-18 RX ADMIN — DEXTRAN 70, GLYCERIN, HYPROMELLOSE 1 DROP: 1; 2; 3 SOLUTION/ DROPS OPHTHALMIC at 20:44

## 2024-08-18 RX ADMIN — CYCLOBENZAPRINE HYDROCHLORIDE 5 MG: 5 TABLET, FILM COATED ORAL at 02:07

## 2024-08-18 RX ADMIN — PANTOPRAZOLE SODIUM 40 MG: 40 INJECTION, POWDER, LYOPHILIZED, FOR SOLUTION INTRAVENOUS at 08:33

## 2024-08-18 RX ADMIN — SILDENAFIL 20 MG: 20 TABLET, FILM COATED ORAL at 14:46

## 2024-08-18 RX ADMIN — CLOPIDOGREL 75 MG: 75 TABLET ORAL at 08:32

## 2024-08-18 RX ADMIN — ETHACRYNIC ACID 50 MG: 25 TABLET ORAL at 08:31

## 2024-08-18 RX ADMIN — BENZOCAINE 6 MG-MENTHOL 10 MG LOZENGES 1 LOZENGE: at 11:31

## 2024-08-18 RX ADMIN — BENZONATATE 100 MG: 100 CAPSULE ORAL at 11:31

## 2024-08-18 RX ADMIN — AMLODIPINE BESYLATE 10 MG: 10 TABLET ORAL at 08:32

## 2024-08-18 RX ADMIN — SILDENAFIL 20 MG: 20 TABLET, FILM COATED ORAL at 08:33

## 2024-08-18 RX ADMIN — SALINE NASAL SPRAY 2 SPRAY: 1.5 SOLUTION NASAL at 08:34

## 2024-08-18 RX ADMIN — GLYCERIN 1 DROP: .002; .002; .01 SOLUTION/ DROPS OPHTHALMIC at 20:43

## 2024-08-18 RX ADMIN — FENTANYL CITRATE 12.5 MCG: 0.05 INJECTION, SOLUTION INTRAMUSCULAR; INTRAVENOUS at 05:04

## 2024-08-18 RX ADMIN — CYCLOBENZAPRINE HYDROCHLORIDE 5 MG: 5 TABLET, FILM COATED ORAL at 21:35

## 2024-08-18 RX ADMIN — LEVOTHYROXINE SODIUM 100 MCG: 0.1 TABLET ORAL at 05:05

## 2024-08-18 RX ADMIN — GLYCERIN 1 DROP: .002; .002; .01 SOLUTION/ DROPS OPHTHALMIC at 14:45

## 2024-08-18 RX ADMIN — ACETAMINOPHEN 975 MG: 325 TABLET ORAL at 23:32

## 2024-08-18 ASSESSMENT — COGNITIVE AND FUNCTIONAL STATUS - GENERAL
STANDING UP FROM CHAIR USING ARMS: 3 - A LITTLE
MOVING TO AND FROM BED TO CHAIR: 3 - A LITTLE
WALKING IN HOSPITAL ROOM: 3 - A LITTLE
CLIMB 3 TO 5 STEPS WITH RAILING: 2 - A LOT

## 2024-08-18 NOTE — PROGRESS NOTES
Hospital Medicine     Daily Progress Note       SUBJECTIVE   Interval History: CHAD, did try fentanyl, reports no pain relief from it. Now endorsing L ankle pain. States not yet comfortable to go home.       OBJECTIVE      Vital signs in last 24 hours:  Temp:  [36.4 °C (97.6 °F)-36.9 °C (98.4 °F)] 36.4 °C (97.6 °F)  Heart Rate:  [71-95] 90  Resp:  [18-24] 20  BP: (105-135)/(58-67) 135/67    Intake/Output Summary (Last 24 hours) at 8/18/2024 1446  Last data filed at 8/18/2024 0715  Gross per 24 hour   Intake 530 ml   Output --   Net 530 ml       PHYSICAL EXAMINATION      Physical Exam  Vitals and nursing note reviewed.   Constitutional:       General: She is not in acute distress.     Comments: Chronically ill-appearing, underweight   HENT:      Head: Normocephalic. Erythematous, taught skin  Eyes:      Conjunctiva/sclera: Conjunctivae normal.   Cardiovascular:      Rate and Rhythm: Normal rate and regular rhythm.   Pulmonary:      Effort: Pulmonary effort is normal. No respiratory distress.      Breath sounds: Normal breath sounds.   Abdominal:      Unable to palpate abd grimaces to even slight touch.   Musculoskeletal: Hand deformities-chronic     Cervical back: Neck supple.      Right lower leg: No edema.      Left lower leg: No edema.   Skin:     General: Skin is warm and dry.   Neurological:      Mental Status: She is alert and oriented to person, place, and time.   Psychiatric:         Behavior: Behavior normal.           LINES, CATHETERS, DRAINS, AIRWAYS, AND WOUNDS   Lines, Drains, and Airways:  Wounds (agree with documentation and present on admission):  Peripheral IV (Adult) 08/08/24 Anterior;Left;Proximal Forearm (Active)   Number of days: 4       Wound Anterior;Right Toe (2nd) (Active)   Number of days: 4       Wound Anterior;Left Toe (Great) (Active)   Number of days: 4       Wound Anterior;Left Toe (2nd) (Active)   Number of days: 4         Comments:    LABS / IMAGING / TELE      Labs  Results from  last 7 days   Lab Units 08/18/24  0945   SODIUM mEQ/L 140   POTASSIUM mEQ/L 4.2   CHLORIDE mEQ/L 109*   CO2 mEQ/L 23   BUN mg/dL 30*   CREATININE mg/dL 1.1   GLUCOSE mg/dL 101*   CALCIUM mg/dL 9.0     Results from last 7 days   Lab Units 08/18/24  0623   WBC K/uL 9.65   HEMOGLOBIN g/dL 7.2*   HEMATOCRIT % 24.5*   PLATELETS K/uL 174           Imaging  No new images today      ECG/Telemetry  Reviewed    ASSESSMENT AND PLAN      Retroperitoneal hematoma  Assessment & Plan  Pt endorsing abdominal pain morning after angio that has progressed since then with inability to tolerate PO. Did have an initial hg drop thus CTAP w/o con ordered and suggestive of RP hematoma with some mass effect on bladder. On 8/16 worsening pain with radiation to R flank. CT angio w/ stable hematoma, no active signs of bleeding   -vascular on board  -Plavix resumed on 8/17 with mild hg drop, stable since yesterday evening  -Q8 CBC, keep TS active   -suspect may need transfusion tomorrow if recurrent drop, If so will hold plavix and discuss with vascular    Abdominal pain  Assessment & Plan  Pt reporting lower quadrant abdominal pain day after angio.  No evidence or bruising at fem access site or palpable masses, but CT demonstrated R RP hematoma.  Repeat CTA 8/16 showed stable hematoma w/o signs of active bleed, however did show mass effect on bladder as well as significant constipation.  Work up including LFTs, lipase unremarkable.  -intermittent leukocytosis like 2.2 to solumedrol pre treatment for contrast allergy.   -Bowel reg ordered,--has not be utilizing much   -encourage use  -pain management as below    -pt with multiple allergies, offered fentanyl pushes as a bridge to fentanyl patch however declined.     Wound of lower extremity  Assessment & Plan  bilateral lower extremity wounds and bilateral second digit dry gangrene  Podiatry consult appreciated  Advised PVR, arterial US; Ultrasound ENRRIQUE with various deficiencies, but seems worse  in the left PT at 0.62.  Arterial ultrasound with hemodynamically significant stenosis in the bilateral popliteal arteries and distal fem  Vascular surgery consult appreciated  s/p LLE angio, balloon angio of distal SFA, TP trunk, DCB angio of popliteal artery on 8/14  Now on Plavix 75mg daily. She has an allergy to aspirin.  Local wound care as per pods    Interstitial lung disease (CMS/HCC)  Assessment & Plan  DPLD  Does not appear to be in exacerbation for this  Has not tolerated trials of Mycophenolate. On Tocilizumab.   On 2L O2-NC chronically    Pain of both eyes  Assessment & Plan  Late 8/10 noted right eye pain,, swelling, blurry vision  Seen by ophthalmology  No concern for preseptal or orbital cellulitis  CT orbits: No evidence of acute process involving the orbits  Carotid US negative for HD significant stenosis  Advised various drops, ointments for her eyes--> per MAR has been declining eye drops as recommended by ortho  Advised stopping Lyrica  Both blurred vision and eye pain have resolved    Chest pain  Assessment & Plan  Chest pain has been ongoing for months, chronic at this point  Exam and history suggest MSK  Pain control is difficult as patient has various allergies, including morphine, oxycodone, NSAIDs.  She has a lidocaine patch, she does not think it is helping much  Consulted pain management; Advised addition of Lyrica, Flexeril, tramadol  Currently, ophthalmology recommended cessation of Lyrica given concerns of it causing her blurry vision/dry eyes  Patient is very apprehensive to try any new medications, she could not tolerate tramadol  Pall on board, pt has tolerated fentanyl    -tried two doses overnight, reports no change in pain    Pulmonary nodule  Assessment & Plan  Left lower lobe nodule, s/p ION navigational bronchoscopy with biopsy showing a small focus of atypical glands.     -Pulm input appreciated  -outpatient follow up with pulmonary regarding biopsy findings    Acute on  chronic heart failure with preserved ejection fraction (CMS/[facility])  Assessment & Plan  EF 65%  Known to Cardiology Dr. Arvizu, in-house consult placed  S/p IV ethacrynic acid, now back on PO  Continue pHTN meds; opsumit, revatio  CHF order set, daily weights, salt/fluid restriction, strict Is&Os      Scleroderma (CMS/[facility])  Assessment & Plan  Again, chronic  Tocilizumab every 28 days    Pulmonary hypertension (CMS/[facility])  Assessment & Plan  Chronic, extensive history    Continue home med macitentan  On sildenafil  Ethacrynic acid 50mg resumed 8/17    Essential (primary) hypertension  Assessment & Plan  Pressure stable here, continue home medications         VTE Assessment: Padua    VTE Prophylaxis:  Current anticoagulants:    None      Code Status: Full Code      Estimated Discharge Date: 8/19/2024       Disposition Planning: pending clinical course     Simon Cano DO  8/18/2024

## 2024-08-18 NOTE — PLAN OF CARE
Problem: Adult Inpatient Plan of Care  Goal: Plan of Care Review  Outcome: Progressing  Flowsheets (Taken 8/18/2024 1221)  Progress: no change  Outcome Evaluation: c/o L ankle pain, prn pain meds adminsterred as tolerated by pt. heatting pad in use.  Plan of Care Reviewed With: patient  Goal: Patient-Specific Goal (Individualized)  Outcome: Progressing  Flowsheets (Taken 8/18/2024 1221)  Patient/Family-Specific Goals (Include Timeframe): to go home  Individualized Care Needs: assit x 1 to bathrooms  Anxieties, Fears or Concerns: concerned about pain   Plan of Care Review  Plan of Care Reviewed With: patient  Progress: no change  Outcome Evaluation: c/o L ankle pain, prn pain meds adminsterred as tolerated by pt. heatting pad in use.

## 2024-08-18 NOTE — PLAN OF CARE
Plan of Care Review  Outcome Evaluation: Pt aaox4. C/o pain overnight. Tried a heating pad, flexeril, tylenol, and Iv fentanyl, only with slight improvement. Pt remained on 2L O2. Ambulated assistx 1 to the bathroom multiple times to void. Pt resting in bed at this time.

## 2024-08-19 PROBLEM — T14.8XXA HEMATOMA: Status: ACTIVE | Noted: 2024-08-16

## 2024-08-19 LAB
ALBUMIN SERPL-MCNC: 3.3 G/DL (ref 3.5–5.7)
ALP SERPL-CCNC: 49 IU/L (ref 34–125)
ALT SERPL-CCNC: 9 IU/L (ref 7–52)
ANION GAP SERPL CALC-SCNC: 7 MEQ/L (ref 3–15)
AST SERPL-CCNC: 11 IU/L (ref 13–39)
BASOPHILS # BLD: 0.04 K/UL (ref 0.01–0.1)
BASOPHILS NFR BLD: 0.5 %
BILIRUB SERPL-MCNC: 0.3 MG/DL (ref 0.3–1.2)
BUN SERPL-MCNC: 25 MG/DL (ref 7–25)
CALCIUM SERPL-MCNC: 9.1 MG/DL (ref 8.6–10.3)
CHLORIDE SERPL-SCNC: 106 MEQ/L (ref 98–107)
CO2 SERPL-SCNC: 27 MEQ/L (ref 21–31)
CREAT SERPL-MCNC: 1.1 MG/DL (ref 0.6–1.2)
DIFFERENTIAL METHOD BLD: ABNORMAL
EGFRCR SERPLBLD CKD-EPI 2021: 56.6 ML/MIN/1.73M*2
EOSINOPHIL # BLD: 0.36 K/UL (ref 0.04–0.36)
EOSINOPHIL NFR BLD: 4.6 %
ERYTHROCYTE [DISTWIDTH] IN BLOOD BY AUTOMATED COUNT: 19.7 % (ref 11.7–14.4)
GLUCOSE SERPL-MCNC: 79 MG/DL (ref 70–99)
HCT VFR BLD AUTO: 27.3 % (ref 35–45)
HGB BLD-MCNC: 8 G/DL (ref 11.8–15.7)
IMM GRANULOCYTES # BLD AUTO: 0.04 K/UL (ref 0–0.08)
IMM GRANULOCYTES NFR BLD AUTO: 0.5 %
LYMPHOCYTES # BLD: 1.68 K/UL (ref 1.2–3.5)
LYMPHOCYTES NFR BLD: 21.6 %
MCH RBC QN AUTO: 24.5 PG (ref 28–33.2)
MCHC RBC AUTO-ENTMCNC: 29.3 G/DL (ref 32.2–35.5)
MCV RBC AUTO: 83.5 FL (ref 83–98)
MONOCYTES # BLD: 0.65 K/UL (ref 0.28–0.8)
MONOCYTES NFR BLD: 8.3 %
NEUTROPHILS # BLD: 5.02 K/UL (ref 1.7–7)
NEUTS SEG NFR BLD: 64.5 %
NRBC BLD-RTO: 0 %
PDW BLD AUTO: 11 FL (ref 9.4–12.3)
PLATELET # BLD AUTO: 184 K/UL (ref 150–369)
POTASSIUM SERPL-SCNC: 4.4 MEQ/L (ref 3.5–5.1)
PROT SERPL-MCNC: 6.2 G/DL (ref 6–8.2)
RBC # BLD AUTO: 3.27 M/UL (ref 3.93–5.22)
SODIUM SERPL-SCNC: 140 MEQ/L (ref 136–145)
WBC # BLD AUTO: 7.79 K/UL (ref 3.8–10.5)

## 2024-08-19 PROCEDURE — 63600000 HC DRUGS/DETAIL CODE: Mod: JZ | Performed by: NURSE PRACTITIONER

## 2024-08-19 PROCEDURE — 99233 SBSQ HOSP IP/OBS HIGH 50: CPT | Performed by: INTERNAL MEDICINE

## 2024-08-19 PROCEDURE — 80053 COMPREHEN METABOLIC PANEL: CPT | Performed by: INTERNAL MEDICINE

## 2024-08-19 PROCEDURE — 63600000 HC DRUGS/DETAIL CODE: Mod: JZ | Performed by: INTERNAL MEDICINE

## 2024-08-19 PROCEDURE — 63700000 HC SELF-ADMINISTRABLE DRUG: Performed by: INTERNAL MEDICINE

## 2024-08-19 PROCEDURE — 99233 SBSQ HOSP IP/OBS HIGH 50: CPT | Performed by: NURSE PRACTITIONER

## 2024-08-19 PROCEDURE — 36415 COLL VENOUS BLD VENIPUNCTURE: CPT | Performed by: INTERNAL MEDICINE

## 2024-08-19 PROCEDURE — 21400000 HC ROOM AND CARE CCU/INTERMEDIATE

## 2024-08-19 PROCEDURE — 85025 COMPLETE CBC W/AUTO DIFF WBC: CPT | Performed by: INTERNAL MEDICINE

## 2024-08-19 PROCEDURE — 99233 SBSQ HOSP IP/OBS HIGH 50: CPT | Performed by: STUDENT IN AN ORGANIZED HEALTH CARE EDUCATION/TRAINING PROGRAM

## 2024-08-19 RX ORDER — SENNOSIDES 8.6 MG/1
2 TABLET ORAL NIGHTLY
Status: DISCONTINUED | OUTPATIENT
Start: 2024-08-19 | End: 2024-08-20

## 2024-08-19 RX ORDER — POLYETHYLENE GLYCOL 3350 17 G/17G
17 POWDER, FOR SOLUTION ORAL 2 TIMES DAILY
Status: DISCONTINUED | OUTPATIENT
Start: 2024-08-19 | End: 2024-08-20

## 2024-08-19 RX ORDER — POLYETHYLENE GLYCOL 3350 17 G/17G
17 POWDER, FOR SOLUTION ORAL 2 TIMES DAILY PRN
Status: DISCONTINUED | OUTPATIENT
Start: 2024-08-19 | End: 2024-08-19

## 2024-08-19 RX ORDER — FENTANYL CITRATE 50 UG/ML
25 INJECTION, SOLUTION INTRAMUSCULAR; INTRAVENOUS EVERY 4 HOURS PRN
Status: DISCONTINUED | OUTPATIENT
Start: 2024-08-19 | End: 2024-08-20

## 2024-08-19 RX ORDER — SENNOSIDES 8.6 MG/1
2 TABLET ORAL 2 TIMES DAILY PRN
Status: DISCONTINUED | OUTPATIENT
Start: 2024-08-19 | End: 2024-08-19

## 2024-08-19 RX ADMIN — SILDENAFIL 20 MG: 20 TABLET, FILM COATED ORAL at 20:10

## 2024-08-19 RX ADMIN — DEXTRAN 70, GLYCERIN, HYPROMELLOSE 1 DROP: 1; 2; 3 SOLUTION/ DROPS OPHTHALMIC at 08:39

## 2024-08-19 RX ADMIN — GLYCERIN 1 DROP: .002; .002; .01 SOLUTION/ DROPS OPHTHALMIC at 06:12

## 2024-08-19 RX ADMIN — GLYCERIN 1 DROP: .002; .002; .01 SOLUTION/ DROPS OPHTHALMIC at 08:29

## 2024-08-19 RX ADMIN — ONDANSETRON 4 MG: 2 INJECTION INTRAMUSCULAR; INTRAVENOUS at 20:01

## 2024-08-19 RX ADMIN — ACETAMINOPHEN 975 MG: 325 TABLET ORAL at 18:36

## 2024-08-19 RX ADMIN — ONDANSETRON 4 MG: 2 INJECTION INTRAMUSCULAR; INTRAVENOUS at 00:33

## 2024-08-19 RX ADMIN — ATORVASTATIN CALCIUM 40 MG: 40 TABLET, FILM COATED ORAL at 18:36

## 2024-08-19 RX ADMIN — GLYCERIN 1 DROP: .002; .002; .01 SOLUTION/ DROPS OPHTHALMIC at 15:18

## 2024-08-19 RX ADMIN — CYANOCOBALAMIN TAB 1000 MCG 1000 MCG: 1000 TAB at 08:35

## 2024-08-19 RX ADMIN — ACETAMINOPHEN 975 MG: 325 TABLET ORAL at 06:26

## 2024-08-19 RX ADMIN — CYCLOBENZAPRINE HYDROCHLORIDE 5 MG: 5 TABLET, FILM COATED ORAL at 06:10

## 2024-08-19 RX ADMIN — SILDENAFIL 20 MG: 20 TABLET, FILM COATED ORAL at 15:17

## 2024-08-19 RX ADMIN — LIDOCAINE 4% 1 PATCH: 40 PATCH TOPICAL at 08:37

## 2024-08-19 RX ADMIN — AMLODIPINE BESYLATE 10 MG: 10 TABLET ORAL at 08:35

## 2024-08-19 RX ADMIN — WHITE PETROLATUM: 1.75 OINTMENT TOPICAL at 20:11

## 2024-08-19 RX ADMIN — PANTOPRAZOLE SODIUM 20 MG: 20 TABLET, DELAYED RELEASE ORAL at 08:35

## 2024-08-19 RX ADMIN — CLOPIDOGREL 75 MG: 75 TABLET ORAL at 08:35

## 2024-08-19 RX ADMIN — FENTANYL CITRATE 25 MCG: 0.05 INJECTION, SOLUTION INTRAMUSCULAR; INTRAVENOUS at 20:07

## 2024-08-19 RX ADMIN — ACETAMINOPHEN 975 MG: 325 TABLET ORAL at 23:33

## 2024-08-19 RX ADMIN — Medication: at 08:30

## 2024-08-19 RX ADMIN — CARBOXYMETHYLCELLULOSE SODIUM 1 DROP: 5 SOLUTION/ DROPS OPHTHALMIC at 08:33

## 2024-08-19 RX ADMIN — ETHACRYNIC ACID 50 MG: 25 TABLET ORAL at 08:31

## 2024-08-19 RX ADMIN — LEVOTHYROXINE SODIUM 100 MCG: 0.1 TABLET ORAL at 06:11

## 2024-08-19 RX ADMIN — ACETAMINOPHEN 975 MG: 325 TABLET ORAL at 12:00

## 2024-08-19 RX ADMIN — LIDOCAINE 4% 1 PATCH: 40 PATCH TOPICAL at 08:36

## 2024-08-19 RX ADMIN — DEXTRAN 70, GLYCERIN, HYPROMELLOSE 1 DROP: 1; 2; 3 SOLUTION/ DROPS OPHTHALMIC at 15:18

## 2024-08-19 RX ADMIN — GLYCERIN 1 DROP: .002; .002; .01 SOLUTION/ DROPS OPHTHALMIC at 18:37

## 2024-08-19 RX ADMIN — DEXTRAN 70, GLYCERIN, HYPROMELLOSE 1 DROP: 1; 2; 3 SOLUTION/ DROPS OPHTHALMIC at 18:37

## 2024-08-19 RX ADMIN — SALINE NASAL SPRAY 2 SPRAY: 1.5 SOLUTION NASAL at 08:29

## 2024-08-19 RX ADMIN — Medication 3 MG: at 21:25

## 2024-08-19 RX ADMIN — WHITE PETROLATUM: 1.75 OINTMENT TOPICAL at 08:39

## 2024-08-19 RX ADMIN — FENTANYL CITRATE 25 MCG: 0.05 INJECTION, SOLUTION INTRAMUSCULAR; INTRAVENOUS at 12:08

## 2024-08-19 RX ADMIN — FENTANYL CITRATE 12.5 MCG: 0.05 INJECTION, SOLUTION INTRAMUSCULAR; INTRAVENOUS at 00:12

## 2024-08-19 RX ADMIN — SILDENAFIL 20 MG: 20 TABLET, FILM COATED ORAL at 08:34

## 2024-08-19 NOTE — PROGRESS NOTES
Palliative Care Progress Note    This is Hospital Day: 12    Conversation/Goals of Care: Life prolongation with the goal of returning home with HHA serviced when medically stable.       Anxious appearance  Assessment & Plan  -anxiety likely related to fear of medication allergies    Patient tearful expressing feeling overwhelmed with the frequency of hospitalizations and complexity of medical conditions.     Plan  - Trial lorazepam 0.25 mg PO x 1, monitor for response  -possible Psych evaluation given medical complexity    Other constipation  Assessment & Plan  -LBM : 8/19  -Improved constipation likely multifactorial r/t current hospitalization, heart failure on diuretic therapy, debility related to multiple medical comorbid conditions  -CT abdomen 8/9 with moderate colonic stool burden     Patient having daily bowel movement without utilizing the scheduled Miralax and Senna  Plan:  - Recommend 1 packet Miralax BID as needed  - Recommend Senokot x2 tabs PO, BID as needed         Pain  Assessment & Plan  - Chronic pain attributed to: chronic chest pain, scleroderma c/b pulmonary HTN/ILD, Raynaud's Disease  - New right lower abdominal pain worse after s/p LLE angio on 8/14.     - Patient with numerous allergies to various pain medications (opioids/NSAIDS)  - PDMP queried: no recent scripts  - Home regimen: lidocaine patch  - Medications available inpatient: Acetaminophen 975 mg p.o. every 6 hours, lidocaine patches, cyclobenzaprine 5 mg p.o. 3 times daily as needed, heating pad  - Tramadol d/c'd 2/2 dizziness  - lyrica d/c'd 2/2 blurred vision  Patient received IV Fentanyl for angio without adverse event.   Recommend Fentanyl 25 mcg IVP every 4 hours as needed     Plan:  - Would establish care with a pain management provider        Palliative care by specialist  Assessment & Plan  63 y.o. female with a PMH significant for severe scleroderma complicated by pulmonary hypertension, ILD.  She presented to [facility] on 8/8 for  planned robotic navigational and EBUS bronchoscopies to evaluate growing LLL nodule c/b chest pain and volume overload.     - Code status: Full Code  - Prognosis:  High risk for acute deterioration and decline  - Capacity for Medical Decision Making: intact  - Advance Directives: No  - Goals of care: Goals are Life-prolonging/disease directed.  Refer to ACP note dated 8/10.  - Surrogate Decision Maker: Patient identifies her 2 adult children, Tiffanie and Wagner, as joint surrogate decision makers        Debility  Assessment & Plan  - Debility likely multifactorial in the setting of multiple chronic medical conditions including scleroderma, CHF, PVD s/p LLE angio c/b retroperitoneal hematoma.   - Living situation prior to hospitalization: Lives at home with 24/7 aides  - Baseline functional status is: Ambulatory short distances  - Current Palliative Performance Status: 40%  - Baseline Palliative Performance Status:  50-60%    - Frailty   - Patient remains high risk for further deterioration and functional decline.    Plan  - Patient open to both palliative bridge and home based palliative NP visits            Interval History (Subjective)    Source: chart review, patient and primary medical team    Patient awake, alert, oriented X4, reporting ongoing 10/10 left upper rib, RLQ abd and right foot pain that is unrelieved with the current pain regimen of Tylenol, Lidocaine patch and Flexeril.  She tried Fentanyl overnight without adverse reaction or relief in pain.     Current Medications:    acetaminophen, 975 mg, oral, q6h SPENCER    amLODIPine, 10 mg, oral, q AM    artificial tears, 1 drop, Both Eyes, QID    atorvastatin, 40 mg, oral, Daily (6p)    benzocaine-menthol, 1 lozenge, Mouth/Throat, q2h PRN    benzonatate, 100 mg, oral, 3x daily PRN    carboxymethylcellulose, 1 drop, Right Eye, 3x daily PRN    clopidogreL, 75 mg, oral, Daily    cyanocobalamin, 1,000 mcg, oral, Daily    cyclobenzaprine, 5 mg, oral, 3x daily  PRN    glucose, 16-32 g of dextrose, oral, PRN **OR** dextrose, 15-30 g of dextrose, oral, PRN **OR** glucagon, 1 mg, intramuscular, PRN **OR** dextrose 50 % in water (D50), 25 mL, intravenous, PRN    diphenhydrAMINE, 50 mg, oral, q8h PRN    docusate sodium, 100 mg, oral, Daily PRN    ethacrynic acid, 50 mg, oral, Daily    fentaNYL, 12.5 mcg, intravenous, q4h PRN    honey, , Topical, Daily PRN    levothyroxine, 100 mcg, oral, Daily (6:30a)    lidocaine, 1 patch, Topical, Daily    lidocaine, 1 patch, Topical, Daily    lidocaine, 1 patch, Topical, Daily    macitentan, 10 mg, oral, Daily    melatonin, 3 mg, oral, Nightly PRN    ondansetron ODT, 4 mg, oral, q8h PRN **OR** ondansetron, 4 mg, intravenous, q8h PRN    pantoprazole, 20 mg, oral, Daily    artificial tears, 1 drop, Both Eyes, q4h while awake    phenol, 1 spray, Mouth/Throat, q2h PRN    polyethylene glycol, 17 g, oral, BID    senna, 2 tablet, oral, BID    sildenafiL (pulm.hypertension), 20 mg, oral, TID    sodium chloride, 2 spray, Each Nostril, Daily    white petrolatum, , Topical, q4h PRN    white petrolatum, , Topical, BID    white petrolatum-mineral oil, , Both Eyes, Nightly    ethacrynic acid    Objective    Physical Exam:  General:   No Acute Distress  Eyes:  Sclera Anicteric  ENMT:  Mucus Membranes Moist  CV:  Radial Pulses 3  bilateral  Lungs:  No evidence of increased WOB  Abdomen:  Bowel Sounds present  Psych:  Appropriate and Cooperative  Neuro:  Awake, Alert and Oriented  person, place, time/date and situation  Skin:  No Rash    Vitals:  Vitals:    08/19/24 0759   BP:    Pulse: 76   Resp:    Temp:    SpO2:        Laboratory Studies:    CBC Results         08/19/24 08/18/24 08/18/24     0622 1612 0623    WBC 7.79 10.61 9.65    RBC 3.27 3.26 2.94    HGB 8.0 7.9 7.2    HCT 27.3 27.1 24.5    MCV 83.5 83.1 83.3    MCH 24.5 24.2 24.5    MCHC 29.3 29.2 29.4     192 174          CMP Results         08/19/24 08/18/24 08/17/24     0622 0945 0847    NA  140 140 138    K 4.4 4.2 4.6    Cl 106 109 106    CO2 27 23 25    Glucose 79 101 89    BUN 25 30 37    Creatinine 1.1 1.1 1.1    Calcium 9.1 9.0 9.2    Anion Gap 7 8 7    AST 11 14 14    ALT 9 11 12    Albumin 3.3 3.4 3.5    EGFR 56.6 56.6 56.6         Troponin I Results         08/09/24 08/09/24 07/25/24     1232 1048 1732    HS Troponin I 93.8 92.5 66.8       ABG Results         04/13/23     1949    Source Of Oxygen nc         Labs reviewed-significant or anemia    Imaging and Other Studies:     Radiology Imaging    XR CHEST 1 VW    Narrative  CLINICAL HISTORY: volume   .    COMMENT: AP chest radiograph was obtained. Comparison is made with recent  studies.    There is ongoing diffuse vascular/interstitial prominence with cardiomegaly.  Pleural effusions are not excluded without gross change from prior. There is no  definite pneumothorax. Osseous degenerative changes are noted.    Impression  IMPRESSION: Ongoing interstitial thickening suggestive of pulmonary edema,  similar to prior.                                                                      Cardiac Imaging    TRANSTHORACIC ECHO (TTE) LIMITED 07/19/2024    Interpretation Summary  Rhythm is sinus tachycardia.    Mild increased wall thickness with normal left ventricular cavity and preserved systolic function. EF 65%. No wall motion abnormalities.  Normal right ventricular size with preserved systolic function.  IVC normal in size with >50% respiratory variation consistent with normal right sided filling pressures.  Small pericardial effusion. No evidence of hemodynamic compromise with normal respiratory variation and no chamber collapse. Prominent epicardial fat.  Compared with previous study of 5/23/2024, small pericardial effusion persists.     No new imaging since 8/16    Lab Results   Component Value Date    QTCCALCULAT 473 08/13/2024       Discussed with: Medical Team, RN     KARYNA Pop  Palliative Care Nurse Practitioner  [place]  [place] [phone] [facility] office  [phone] [facility] Fax  [address]  [place] [phone]  Pager [phone]

## 2024-08-19 NOTE — PLAN OF CARE
Care Coordination Discharge Plan Note     Discharge Needs Assessment  Concerns to be Addressed: discharge planning  Current Discharge Risk: chronically ill    Anticipated Discharge Plan  Anticipated Discharge Disposition: home with assistance, home with home health  Type of Home Care Services: home PT, nursing, home OT    Patient Choice  Offered/Gave Vendor List: yes  Patient's Choice of Community Agency(s): [facility]    Patient and/or patient guardian/advocate was made aware of their right to choose a provider. A list of eligible providers was presented and reviewed with the patient and/or patient guardian/advocate in written and/or verbal form. The list delineates providers in the patient’s desired geographic area who are participating in the Medicare program and/or providers contracted with the patient’s primary insurance. The Medicare list and quality ratings were obtained from the Medicare.gov [medicare.gov] website.    ---------------------------------------------------------------------------------------------------------------------    Interdisciplinary Discharge Plan Review:  Participants:occupational therapy, , pharmacy, physical therapy, physician, nursing    Concerns Comments: Tentative D/C home with [facility]. Updated clinical information to [facility] via [facility]. RIRI for pt's OP  @ Maye Jordan @ (w) [phone].    Discharge Plan:   Disposition/Destination: Home Health Care - Other / Home    Discharge Transportation:  Is Out of Hospital DNR needed at Discharge: no  Does patient need discharge transport? No

## 2024-08-19 NOTE — NURSING NOTE
Updates-no changes from previous assessment. Pt resting in bed and offers no complaints at this time, every hr checks done. Bed alarm on, call bell within reach, will cont to monitor.

## 2024-08-19 NOTE — PROGRESS NOTES
Patient: Arielle GUAJARDO Isidro  Location: [place] [address]  MRN:  [id number]  Today's date:  8/19/2024    Attempted to see patient for therapy. Unable due to patient refused (politely declined OT intervention at this time, reports pain and fatigue. Continued to decline despite education and encouragement. OT to follow up as able and appropriate and will coordinate for optimized pain managment).

## 2024-08-19 NOTE — PLAN OF CARE
Plan of Care Review  Plan of Care Reviewed With: patient  Progress: improving  Outcome Evaluation: control pain-Adm prn pain medication as ordered, assist with ADLs

## 2024-08-19 NOTE — PROGRESS NOTES
Hospital Medicine     Daily Progress Note       SUBJECTIVE   Interval History: Arielle is still reporting significant chest pain. Her lower abdominal pain seems to have improved. She endorses nausea/vomiting this AM. She was able to eat oatmeal for breakfast afterward. She is moving her bowels. SOB is unchanged.     OBJECTIVE      Vital signs in last 24 hours:  Temp:  [36.6 °C (97.8 °F)-36.7 °C (98.1 °F)] 36.6 °C (97.8 °F)  Heart Rate:  [67-93] 84  Resp:  [18-20] 18  BP: (118-140)/(56-85) 118/56    Intake/Output Summary (Last 24 hours) at 8/19/2024 1231  Last data filed at 8/19/2024 0600  Gross per 24 hour   Intake 250 ml   Output 350 ml   Net -100 ml       PHYSICAL EXAMINATION      Physical Exam  Vitals and nursing note reviewed.   Constitutional:       General: She is not in acute distress.     Comments: Chronically ill-appearing, underweight   HENT:      Head: Normocephalic.   Eyes:      Conjunctiva/sclera: Conjunctivae normal.   Cardiovascular:      Rate and Rhythm: Normal rate and regular rhythm.   Pulmonary:      Effort: Pulmonary effort is normal. No respiratory distress.      Breath sounds: Normal breath sounds.   Abdominal:      General: Bowel sounds are normal. There is no distension.      Palpations: Abdomen is soft.      Tenderness: There is no abdominal tenderness.   Musculoskeletal: Hand deformities-chronic     Cervical back: Neck supple.      Right lower leg: No edema.      Left lower leg: No edema.   Skin:     General: Skin is warm and dry.   Neurological:      Mental Status: She is alert and oriented to person, place, and time.   Psychiatric:         Behavior: Behavior normal.        LINES, CATHETERS, DRAINS, AIRWAYS, AND WOUNDS   Lines, Drains, and Airways:  Wounds (agree with documentation and present on admission):  Peripheral IV (Adult) 08/18/24 Left;Posterior Wrist (Active)   Number of days: 1       Wound Anterior;Right Toe (2nd) (Active)   Number of days: 11       Wound Anterior;Left Toe  (Great) (Active)   Number of days: 11       Wound Anterior;Left Toe (2nd) (Active)   Number of days: 11       Surgical Incision Groin Right (Active)   Number of days: 5         Comments:    LABS / IMAGING / TELE      Labs  Results from last 7 days   Lab Units 08/19/24  0622   SODIUM mEQ/L 140   POTASSIUM mEQ/L 4.4   CHLORIDE mEQ/L 106   CO2 mEQ/L 27   BUN mg/dL 25   CREATININE mg/dL 1.1   GLUCOSE mg/dL 79   CALCIUM mg/dL 9.1     Results from last 7 days   Lab Units 08/19/24  0622   WBC K/uL 7.79   HEMOGLOBIN g/dL 8.0*   HEMATOCRIT % 27.3*   PLATELETS K/uL 184     Results from last 7 days   Lab Units 08/15/24  0901   MAGNESIUM mg/dL 2.3           Imaging  No new images today      ECG/Telemetry  Reviewed    ASSESSMENT AND PLAN      Chest pain  Assessment & Plan  Chest pain has been ongoing for months, chronic at this point  Exam and history suggest MSK  Pain control is difficult as patient has various allergies, including morphine, oxycodone, NSAIDs  Reports cannot tolerate gabapentin  She has a lidocaine patch, she does not think it is helping much  Consulted pain management; Advised addition of Lyrica, Flexeril, tramadol  Currently, ophthalmology recommended cessation of Lyrica given concerns of it causing her blurry vision/dry eyes  Patient is very apprehensive to try any new medications, she could not tolerate tramadol  Pall on board, pt has tolerated fentanyl     Wound of lower extremity  Assessment & Plan  bilateral lower extremity wounds and bilateral second digit dry gangrene  Podiatry consult appreciated  Advised PVR, arterial US; Ultrasound ENRRIQUE with various deficiencies, but seems worse in the left PT at 0.62.  Arterial ultrasound with hemodynamically significant stenosis in the bilateral popliteal arteries and distal fem  Vascular surgery consult appreciated  s/p LLE angio, balloon angio of distal SFA, TP trunk, DCB angio of popliteal artery on 8/14  Now on Plavix 75mg daily. She has an allergy to  aspirin.  Local wound care as per pods    Interstitial lung disease (CMS/HCC)  Assessment & Plan  DPLD  Does not appear to be in exacerbation for this  Has not tolerated trials of Mycophenolate. On Tocilizumab.   On 2L O2-NC chronically    Hematoma  Assessment & Plan  Imaging reviewed by Vascular Dr. Thomas; Probable small hematoma at the right groin near the access site extending into the preperitoneal area without evidence of contrast extravasation. There is no pseudoaneurysm on US.  --Hgb has been stable, currently 8  --Plavix restarted 8/17    Abdominal pain  Assessment & Plan  Pt reporting lower quadrant abdominal pain day after angio.  No evidence or bruising at fem access site or palpable masses, but CT demonstrated R RP hematoma.  Repeat CTA 8/16 showed stable hematoma w/o signs of active bleed, however did show mass effect on bladder as well as significant constipation.  Work up including LFTs, lipase unremarkable.  -intermittent leukocytosis like 2.2 to solumedrol pre treatment for contrast allergy.   -Bowel reg ordered  -pain management as below   -pt with multiple allergies, offered fentanyl pushes PRN    Pain of both eyes  Assessment & Plan  Late 8/10 noted right eye pain,, swelling, blurry vision  Seen by ophthalmology  No concern for preseptal or orbital cellulitis  CT orbits: No evidence of acute process involving the orbits  Carotid US negative for HD significant stenosis  Advised various drops, ointments for her eyes--> per MAR has been declining eye drops as recommended by ortho  Advised stopping Lyrica  Both blurred vision and eye pain have resolved    Pulmonary nodule  Assessment & Plan  Left lower lobe nodule, s/p ION navigational bronchoscopy with biopsy showing a small focus of atypical glands.     -Pulm input appreciated  -outpatient follow up with pulmonary regarding biopsy findings    Acute on chronic heart failure with preserved ejection fraction (CMS/HCC)  Assessment & Plan  EF  65%  Known to Cardiology Dr. Arvizu, in-house consult placed  S/p IV ethacrynic acid, now back on PO  Continue pHTN meds; opsumit, revatio  CHF order set, daily weights, salt/fluid restriction, strict Is&Os      Scleroderma (CMS/HCC)  Assessment & Plan  Again, chronic  Tocilizumab every 28 days    Pulmonary hypertension (CMS/[facility])  Assessment & Plan  Chronic, extensive history    Continue home med macitentan  On sildenafil  Ethacrynic acid 50mg resumed 8/17    Essential (primary) hypertension  Assessment & Plan  Pressure stable here, continue home medications         VTE Assessment: [place]    VTE Prophylaxis:  Current anticoagulants:    None      Code Status: Full Code      Estimated Discharge Date: 8/19/2024     Disposition Planning: pending clinical course, PTOT KARYNA Becerra  8/19/2024

## 2024-08-19 NOTE — PROGRESS NOTES
Cardiology/pulmonary hypertension service progress note    Interval History:    Using restroom, dyspnea on exertion persists.  Having groin, toe, and chest pain, not very well controlled as she is intolerant to most pain medications.   Has not had standing weight since Friday.  Started back on diuretic Saturday.     Background per Dr. Arvizu:    Ms. Felix is a 63 y.o. female with a past medical history of Pulmonary HTN, HTN, Raynaud's Disease, Cutaneous Systemic Scleroderma (dx 1998), ILD, PE (3/2023). She is a patient of Dr. Nguyễn. She was previously followed by Dr. Jos Valdez at [facility]. Echocardiogram from 4/21/2022 showed LVEF: 55-60%, mild aortic valve thickening, pulmonary artery systolic pressure: 52 mmHg and trivial pericardial effusion. LHC and RHC (separate occasions) over the last 5-10 years which showed normal hemodynamics and normal coronaries. She had a RHC 9/02/2020 showing top-normal right sided filling pressures with mild pulmonary hypertension, top-normal PVR and normal pulmonary capillary wedge pressure. The cardiac index was normal, seen below.     On 6/27/2022, she was in [facility] ER for chest pain. EKG: NSR without ischemic changes. hsTrop: 12->16, d-dimer: 0.56, CXR showed cardiomegaly and diffuse interstitial prominence.     Dr. Nguyễn ordered echocardiogram, which was completed on 8/22/2022 and showed estimated RVSP = 50-55 mmHg, normal-sized LV with normal LV systolic function. Estimated EF 55- 60%. Wall motion grossly normal, mild concentric left ventricular hypertrophy, grade I LV diastolic dysfunction, probably normal RV size and function.    At her initial visit on 10/11/2022, she reported that she felt very short of breath with minimal activity most of the time. She reports that this started about 1 year prior and had progressively gotten worse. She described PND and bendopnea. She reported that she experienced ankle swelling, worsened over the past year and usually worse towards  the end of the night. She also reported some swelling in her abdomen. She reported daily palpations. I recommended a RHC which was done 11/28/2022 and showed: Normal right sided filling pressures. Mildly elevated left sided filling pressures. Precapillary pulmonary hypertension with mean PA 36 mmHg.    She was hospitalized on 3/19/2023 at [facility] for PE diagnosed on V/Q scan. She reports that she had presented with left arm pain and heaviness. She states that she was started on Apixaban. One week after her last office visit (4/6/2023), she presented to [facility] with chest pain, epistaxis and hemoptysis. Echocardiogram showed: left ventricular cavity size normal with mild left ventricular hypertrophy and preserved systolic function. Estimated EF 65%. Chest CTA showed no evidence of PE; Apixaban was discontinued.     She had a repeat echocardiogram (6/2023) which showed: Normal left ventricular cavity size and mild concentric left ventricular hypertrophy. Normal systolic function. EF: 60%. Normal right ventricular structure and function. Mild tricuspid valve regurgitation with estimated RVSP: 55 mmHg. Compared to previous study from 4/14/2023, estimated right ventricular systolic pressure were higher.    She underwent V/Q scan in September 2023, which showed intermediate-to-high probability of pulmonary embolic disease, as a result she completed CTA Chest PE which showed: no evidence for filling defect or vessel cutoff to suggest pulmonary embolism. Enlargement of the pulmonary arteries compatible with pulmonary arterial hypertension was seen.    She reports she started Macitentan around August or September 2023. She stated that she had not been checking her SpO2. She reports she did not notice any difference since starting it. She reported she had been feeling more shortness of breath at times with ADLs.     She presented to [facility] on 3/3/2024 with chest pressure, palpitations and nausea. She was noted to  have hypoxia and tachypnea at presentation. She was found to have moderate-to-large pericardial effusion without tamponade. She was started on Colchicine 0.6 mg BID on 3/4/2024 and when she did not improve symptomatically, Prednisone 20 mg daily was added on 3/5/2024. On 3/7/2024, she developed multiple episodes of diarrhea and Colchicine dose was decreased to 0.3 mg. Coronary CTA, done as part of the ischemic workup, showed moderate 2 vessel CAD. She was recommended to begin Atorvastatin 20 mg daily and Clopidogrel 75 mg daily. She initially refused those two medications, but eventually agreed to take Atorvastatin.      She was discharged on Colchicine 0.3 mg for at least 3 months and Prednisone 20 mg for a total two week course until 3/19/2024, then decrease the dose to 10 mg daily for 2 weeks until 4/2/2024, then decrease dose to 5 mg daily for 2 weeks until 4/16/2024. She reports she tapered off Prednisone as instructed and has continued to take Colchicine as prescribed. She completed an echocardiogram (4/10/2024) which showed: Small-to-moderate sized, circumferential pericardial effusion. No echocardiographic evidence for cardiac tamponade. Compared to previous study from 3/4/2024, no significant change. Pericardial effusion size was similar, estimated right atrial pressure lower.      She presented to [facility] (4/16/2024) with increased pleuritic chest pain. On presentation to the ER, she was noted to be hypertensive and tachycardic. Labs showed mildly elevated troponin, elevated BNP and leukocytosis. CT angiography of the chest did not show evidence of PE. It was read as large pericardial effusion, evidence of emphysema, evidence of pulmonary hypertension, 16 mm left lobe lung mass.       She presented again to [facility] on 5/21/2024 at the request of her Rheumatologist. During her visit there, she was noted to be tachycardic and with some shortness of breath. Her chest pain was improved from her prior admission. On  "arrival, O2 saturations were normal. ECG showed: Sinus Tachycardia (HR: 125 bpm). BNP >300, hsTrop 32 ->24. POCUS in the emergency department showed evidence of pericardial effusion with no tamponade physiology. CTA Chest did not show evidence of pulmonary embolism. There were bilateral changes radiographically concerning for volume overload. She received Methylpredinsolone 125 mg in ED. Of note, she was on Prednisone 10 mg daily at home. She was given IV diuresis. She was discharged on Furosemide 20 mg PO PRN daily for fluid weight gain / swelling.     She had brief hospitalization and was discharged on 7/23 for chest pain. She presented to the ED on 7/25/2024 for worsening of her chronic chest pain and 8/5/2024 for facial swelling and chronic chest pain.      She underwent bronchoscopic lung biopsy yesterday, 8/8/2024, with Dr. Niles Rodriguez. Pathology showed atypical glandular cells which may be consistent with adenocarcinoma in situ.     Post-procedure CXR showed increase pulmonary vascular congestion. She was admitted for optimization.       ---    Rheumatology: Dr. Trevizo  Pulmonology: Dr. Wasserman    Past Medical / Surgical History:  Pulmonary HTN, HTN, Raynaud's Disease, Cutaneous Systemic Scleroderma (dx 1998), ILD, PE (3/2023), Follicular Carcinoma of Thyroid, Tenosynovitis, de Quervain, h/o Hernia Repair, h/o Appendicitis, h/o Digit Amputation, H/o Total Thyroidectomy (Dr. Pacheco at hospitals; 4/2013)    Family & Social History: She is , she has two children. She has worked as an .     Tobacco: former 2005  EtOH: never   Illicits: never     Her brother has CAD with stent  1st cousin with SLE  Both parents had \"heart problems\"    Allergies:   Allergies   Allergen Reactions    Aspirin Shortness of breath     Other reaction(s): Unknown  \"stop breathing\"      Ibuprofen Shortness of breath     Stop breathing    Iodine Shortness of breath     Other reaction(s): Unknown    Iodine And Iodide Containing " Products Shortness of breath     ? rash    Morphine Shortness of breath      Reported wamrth of face, improved with benadryl and cold compresses after getting morphine as well as episode of (subjective ) dyspnea, and thought she passed out - did have wamrth and erythema of face with mild edema R>L cheek notably on exam.     Penicillins Shortness of breath     Other reaction(s): Unknown    Sulfa (Sulfonamide Antibiotics) Angioedema    Clindamycin     Hydrochlorothiazide      Other reaction(s): Abdominal Pain   Slow heart rate    Oxycodone      Other reaction(s): Vomiting    Sulfamethoxazole-Trimethoprim      Other reaction(s): Vomiting, Weakness     Latex Rash       Medications:   Current Facility-Administered Medications   Medication Dose Route Frequency Provider Last Rate Last Admin    acetaminophen (TYLENOL) tablet 975 mg  975 mg oral q6h SPENCER Simon Cano DO   975 mg at 08/19/24 0626    amLODIPine (NORVASC) tablet 10 mg  10 mg oral q AM Simon Cano DO   10 mg at 08/19/24 0835    artificial tears (dextran-hpm-glyc) (GENTEAL TEARS) 0.1-0.3-0.2 % eye drops 1 drop  1 drop Both Eyes QID Simon Cano DO   1 drop at 08/19/24 0839    atorvastatin (LIPITOR) tablet 40 mg  40 mg oral Daily (6p) Simon Cano DO   40 mg at 08/14/24 1654    benzocaine-menthol (CEPACOL/CHLORASEPTIC) lozenge 1 lozenge  1 lozenge Mouth/Throat q2h PRN Simon Cano DO   1 lozenge at 08/18/24 1131    benzonatate (TESSALON) capsule 100 mg  100 mg oral 3x daily PRN Simon Cano DO   100 mg at 08/18/24 1131    carboxymethylcellulose (REFRESH PLUS) 0.5 % ophthalmic dropperette 1 drop  1 drop Right Eye 3x daily PRN Simon Cano DO   1 drop at 08/19/24 0833    clopidogreL (PLAVIX) tablet 75 mg  75 mg oral Daily Simon Cano DO   75 mg at 08/19/24 0835    cyanocobalamin (VITAMIN B12) tablet 1,000 mcg  1,000 mcg oral Daily Jerome  Simon Camara DO   1,000 mcg at 08/19/24 0835    cyclobenzaprine (FLEXERIL) tablet 5 mg  5 mg oral 3x daily PRN Simon Cano DO   5 mg at 08/19/24 0610    glucose chewable tablet 16-32 g of dextrose  16-32 g of dextrose oral PRN Simon Cano DO        Or    dextrose 40 % oral gel 15-30 g of dextrose  15-30 g of dextrose oral PRN Simon Cano DO        Or    glucagon (GLUCAGEN) injection 1 mg  1 mg intramuscular PRN Simon Cano DO        Or    dextrose 50 % in water (D50) injection 12.5 g  25 mL intravenous PRN Simon Cano DO        diphenhydrAMINE (BENADRYL) capsule 50 mg  50 mg oral q8h PRN Simon Cano DO        docusate sodium (COLACE) capsule 100 mg  100 mg oral Daily PRN Simon Cano DO        ethacrynic acid (EDECRIN) tablet 50 mg  50 mg oral Daily Simon Cano DO   50 mg at 08/19/24 0831    fentaNYL (PF) (SUBLIMAZE) injection 12.5 mcg  12.5 mcg intravenous q4h PRN Simon Cano DO   12.5 mcg at 08/19/24 0012    honey (MEDIHONEY) 100 % topical paste   Topical Daily PRN Simon Cano DO   Given at 08/19/24 0830    levothyroxine (SYNTHROID) tablet 100 mcg  100 mcg oral Daily (6:30a) Simon Cano DO   100 mcg at 08/19/24 0611    lidocaine (ASPERCREME) 4 % topical patch 1 patch  1 patch Topical Daily Simon Cano DO   1 patch at 08/19/24 0837    lidocaine (ASPERCREME) 4 % topical patch 1 patch  1 patch Topical Daily Simon Cano DO   1 patch at 08/19/24 0837    lidocaine (ASPERCREME) 4 % topical patch 1 patch  1 patch Topical Daily Simon Cano DO   1 patch at 08/19/24 0836    macitentan (OPSUMIT) tablet 10 mg (Patient Owned)  10 mg oral Daily Simon Cano DO   10 mg at 08/19/24 0831    melatonin ODT 3 mg  3 mg oral Nightly PRN Simon Cano DO   3 mg at 08/18/24 0156     ondansetron ODT (ZOFRAN-ODT) disintegrating tablet 4 mg  4 mg oral q8h PRN Simon Cano DO   4 mg at 08/17/24 0820    Or    ondansetron (ZOFRAN) injection 4 mg  4 mg intravenous q8h PRN Simon Cano DO   4 mg at 08/19/24 0033    pantoprazole (PROTONIX) tablet,delayed release (DR/EC) 20 mg  20 mg oral Daily Simon Cano DO   20 mg at 08/19/24 0835    peg 400-hypromellose-glycerin (ARTIFICIAL TEARS) 1-0.2-0.2 % eye drops 1 drop  1 drop Both Eyes q4h while awake Simon Cano DO   1 drop at 08/19/24 0829    phenoL (SORE THROAT SPRAY) 1.4 % mucosal spray 1 spray  1 spray Mouth/Throat q2h PRN Simon Cano DO        polyethylene glycol (MIRALAX) 17 gram packet 17 g  17 g oral BID Simon Cano DO   17 g at 08/16/24 2038    senna (SENOKOT) tablet 2 tablet  2 tablet oral BID Simon Cano DO   2 tablet at 08/17/24 0739    sildenafiL (pulm.hypertension) (REVATIO) tablet 20 mg  20 mg oral TID Simon Cano DO   20 mg at 08/19/24 0834    sodium chloride (OCEAN) 0.65 % nasal spray 2 spray  2 spray Each Nostril Daily Simon Cano DO   2 spray at 08/19/24 0829    white petrolatum (AQUAPHOR) ointment   Topical q4h PRN Simon Cano DO   Given at 08/13/24 2117    white petrolatum (AQUAPHOR) ointment   Topical BID Simon Cano DO   Given at 08/19/24 0839    white petrolatum-mineral oil (ARTIFICIAL TEARS OINT) ophthalmic ointment   Both Eyes Nightly Simon Cano DO   Given at 08/18/24 2049       Review of Systems:   All other systems reviewed and are negative except as per HPI.    Physical Exam:     Wt Readings from Last 10 Encounters:   08/19/24 52.6 kg (115 lb 14.4 oz)   08/05/24 49.4 kg (109 lb)   08/05/24 49.4 kg (109 lb)   07/20/24 49.5 kg (109 lb 2 oz)   07/19/24 51.3 kg (113 lb)   06/20/24 51.7 kg (114 lb)   06/14/24 52.6 kg (116 lb)   05/23/24 52.4 kg  (115 lb 9.6 oz)   04/17/24 53.5 kg (118 lb)   04/10/24 44.5 kg (98 lb)       BP Readings from Last 5 Encounters:   08/19/24 137/60   08/05/24 (!) 148/75   07/25/24 (!) 155/77   07/23/24 (!) 101/57   07/19/24 (!) 154/80       Vitals:    08/19/24 0730   BP: 137/60   Pulse: 73   Resp: 18   Temp: 36.7 °C (98 °F)   SpO2: 100%       Constitutional: NAD, AAOx3  HENT: Normocephalic, atraumatic head, sclerae anicteric, no cervical lymphadenopathy, trachea midline, facial swelling  Cardiovascular: RRR, no murmurs, rubs or gallops, JVP: 10 cm H2O   cm H2O, no carotid bruits.  Respiratory: CTA bilateral lung fields, no wheezes, rales or rhonchi  GI: soft, non-tender/non-distended  Musculoskeletal / Extremities: Bilateral hip edema noted, +2 pulses bilateral radial, DP/PT arteries, skin tightening on face and fingertips, ulceration and discoloration of 2nd phalange b/l LE.   Skin: no rashes. Facial changes c/w scleroderma  Neuro / Psych: no focal deficits, CNII-XII grossly intact, appropriate, cooperative    Diagnostic Data:     Component      Latest Ref Rng 7/20/2024 7/25/2024   Sodium      136 - 145 mEQ/L 142  139    Potassium, Bld      3.5 - 5.1 mEQ/L 4.2  4.3    Chloride      98 - 107 mEQ/L 107  103    CO2      21 - 31 mEQ/L 25  27    BUN      7 - 25 mg/dL 17  17    Creatinine      0.6 - 1.2 mg/dL 1.2  1.1    Glucose      70 - 99 mg/dL 85  86    Calcium      8.6 - 10.3 mg/dL 9.3  10.6 (H)    AST (SGOT)      13 - 39 IU/L  15    ALT (SGPT)      7 - 52 IU/L  8    Alkaline Phosphatase      34 - 125 IU/L  82    Total Protein      6.0 - 8.2 g/dL  8.8 (H)    Albumin      3.5 - 5.7 g/dL  4.3    Bilirubin, Total      0.3 - 1.2 mg/dL  0.4    eGFR      >=60.0 mL/min/1.73m*2 51.0 (L)  56.6 (L)    Anion Gap      3 - 15 mEQ/L 10  9       Component      Latest Ref Rng 7/20/2024 7/25/2024   WBC      3.80 - 10.50 K/uL 7.27  10.29    Hemoglobin      11.8 - 15.7 g/dL 9.8 (L)  11.2 (L)    Hematocrit      35.0 - 45.0 % 32.0 (L)  37.3    MCV       83.0 - 98.0 fL 80.4 (L)  80.4 (L)    Platelets      150 - 369 K/uL 206  331       Component      Latest Ref Rng 7/19/2024 7/25/2024 8/9/2024   High Sens Troponin I      <15.0 pg/mL 89.2 (HH)  66.8 (HH)  92.5 (HH)    High Sens Troponin I       90.3 (HH)  83.4 (HH)         Component      Latest Ref Rng 5/22/2024 6/22/2024 7/19/2024 7/25/2024   BNP      <=100 pg/mL 352 (H)  150 (H)  151 (H)  220 (H)       Component      Latest Ref Rn 4/16/2024 5/23/2024 7/21/2024 7/22/2024   Sed Rate      0 - 30 mm/hr 101 (H)  79 (H)  105 (H)  >119 (H)       Component      Latest Ref Rn 4/18/2024 5/24/2024   WBC      3.80 - 10.50 K/uL 8.40  16.22 (H)    RBC      3.93 - 5.22 M/uL 3.54 (L)  3.82 (L)    Hemoglobin      11.8 - 15.7 g/dL 9.8 (L)  10.3 (L)    Hematocrit      35.0 - 45.0 % 32.8 (L)  33.9 (L)    Platelets      150 - 369 K/uL 254  289       Component      Latest Ref Rn 5/24/2024   Magnesium      1.8 - 2.5 mg/dL 2.1      Component      Latest Ref Rn 4/13/2023 3/7/2024 5/22/2024   TSH      0.34 - 5.60 mIU/L 4.71  0.28 (L)  1.03      Component      Latest Ref Rn 5/24/2024   Sodium      136 - 145 mEQ/L 138    Potassium, Bld      3.5 - 5.1 mEQ/L 4.4    Chloride      98 - 107 mEQ/L 104    CO2      21 - 31 mEQ/L 24    BUN      7 - 25 mg/dL 40 (H)    Creatinine      0.6 - 1.2 mg/dL 1.1    Glucose      70 - 99 mg/dL 87    Calcium      8.6 - 10.3 mg/dL 9.5    eGFR      >=60.0 mL/min/1.73m*2 56.6 (L)    Anion Gap      3 - 15 mEQ/L 10      Component      Latest Ref Rng 4/13/2023   Hemoglobin A1C      <5.7 % 4.4        Component      Latest Ref Rng 4/14/2023   Triglycerides      30 - 149 mg/dL 44    Cholesterol      <=200 mg/dL 194    HDL      >=55 mg/dL 49 (L)    LDL Calculated      <=100 mg/dL 136 (H)    Non-HDL, Calculated      mg/dL 145    RISK      <=5.0  4.0       Component      Latest Ref Rng 4/13/2023   High Sens Troponin I      <15.0 pg/mL 23.0 (H)       Component      Latest Ref Rng 4/14/2023   Ferritin      11 - 250 ng/mL 75       Component      Latest Ref Rng 4/14/2023   Iron      35 - 150 ug/dL 29 (L)    TIBC      270 - 460 ug/dL 245 (L)    UIBC      180 - 360 ug/dL 216    Iron Saturation      15 - 45 % 12 (L)        Component      Latest Ref Rng 6/27/2022 4/13/2023   BNP      <=100 pg/mL 111 (H)  105 (H)                          ---    ECG (8/2/2024, personally reviewed):   Sinus tachycardia   Left ventricular hypertrophy with repolarization abnormality    HR: 117 bpm     ---        Lower extremity arterial duplex and ENRRIQUE August 10, 2024:  Right ankle ENRRIQUE (DP): 0.74.  Right ankle ENRRIQUE (PT): 0.88.  Left ankle ENRRIQUE (DP): 0.90.  Left ankle ENRRIQUE (PT): 0.62.    Findings concerning for hemodynamically significant stenosis within the  bilateral popliteal arteries and distal left femoral artery. There are also  findings which can be seen with inflow stenosis at the level left common femoral  artery. CTA of the abdomen and pelvis with lower extremity runoff could be  performed for further evaluation if clinically indicated.    CT Chest / Abdomen / Pelvis w/ Contrast (7/25/2024, personally reviewed):     Lung bases: Left-sided pleural effusion, left parenchymal consolidation, and  findings of interstitial lung disease.  More nodular density in the medial  aspect of the left lower lobe measuring 1.6 x 1.9 cm.  Cardiomegaly with a  pericardial effusion.     Liver: The liver is normal in size.  There is no focal mass.  Hepatic veins and  portal veins are patent without focal filling defects to suggest thrombosis..     Gallbladder:  Small dependent gallstones.  No gallbladder wall thickening..     Spleen: Spleen is normal in size without focal mass lesion..     Pancreas: The pancreas is normal without focal mass, parenchymal atrophy, or  pancreatic duct dilation..     Adrenals: The adrenals are normal bilaterally without focal mass..     Kidneys, ureters and bladder:  Symmetric enhancement and excretion..  Left lower  pole cystic lesion measures 3.2  cm in maximal dimension.  This measures greater  than simple fluid density.  This measured 60 Hounsfield units on the noncontrast  CT from 2020.  It measures 36 Hounsfield units on today's study.  Therefore it  is most in keeping with a hemorrhagic/proteinaceous cyst.  Additional  hypodensities too small to characterize.     Retroperitoneal structures: There is no abdominal aortic aneurysm.  Atherosclerotic calcification.  There is no retroperitoneal adenopathy or  retroperitoneal mass..     Bowel and mesentery: No evidence of a focal inflammatory or obstructive process.  Moderate fecal retention throughout the colon.  There are a few fluid-filled  small bowel loops in the pelvis, nonspecific.     Lymph nodes: No pathologic lymphadenopathy..     Pelvis: The uterus is normal in size.  The ovaries are normal.  There is no  adnexal mass or pelvic free fluid.     Bones: Within normal limits.    No evidence for pulmonary embolism.  Persistent left-sided pleural effusion and airspace disease at the left lung  base.  Underlying interstitial lung disease.     TTE (7/19/2024, personally reviewed):   Mild increased wall thickness with normal left ventricular cavity and preserved systolic function. EF 65%. No wall motion abnormalities.   Normal right ventricular size with preserved systolic function.   IVC normal in size with >50% respiratory variation consistent with normal right sided filling pressures.   Small pericardial effusion. No evidence of hemodynamic compromise with normal respiratory variation and no chamber collapse. Prominent epicardial fat.   Compared with previous study of 5/23/2024, small pericardial effusion persists.      PET/CT Skull-to-Thigh (6/4/2024):   An approximately 1.6 cm pulmonary nodule at the medial left lung base is  significantly FDG avid at max SUV 8.5.  This is nonspecific and can be seen in  the setting of benign pathology however malignancy is a concern.     There is more mild to moderate  nonspecific uptake localizing to lita-fissural  nodules particularly on the left.     There is  moderate nonspecific left hilar and mediastinal dawson uptake.     No additional suspicious FDG avid findings appreciated.     CTA Chest (5/21/2024):   IMPRESSION:  1.  No evidence of acute pulmonary embolism.  2.  Multiple new perifissural left lower lobe nodules, suspicious for a  neoplastic process. PET/CT and or tissue sampling is recommended.  3.  Chronic interstitial lung disease in an NSIP pattern, with overall slightly  progressed appearance since March 2023. Pulmonary consultation is recommended.  4.  Stable cardiomegaly and large pericardial effusion.  5.  Mediastinal and supraclavicular adenopathy, similar to prior study, also  indeterminate, which could be further evaluated at time of PET/CT.     CTA Chest (4/16/2024):   IMPRESSION:  1. No evidence of pulmonary embolism.  2. Stable cardiomegaly and large pericardial effusion. Cardiology consultation  suggested.  3. Emphysema with bilateral lower lobe groundglass opacity and bronchiectasis in  an NSIP pattern, which can be seen with scleroderma. Pulmonary consultation  suggested.  4. Stable 16 mm left lower lobe masslike density which may represent  atelectasis, lung cancer or lymphoma. When clinically appropriate PET/CT  suggested.  5. Stable prominence of the main pulmonary artery which can be seen with  pulmonary hypertension     TTE (5/23/2024, personally reviewed):  Mild increased wall thickness with normal left ventricular cavity and preserved systolic function. EF 65%. No wall motion abnormalities.   Normal right ventricular size with preserved systolic function.   Tiny IVC with >50% respiratory variation consistent with low-normal right sided filling pressures.   Small pericardial effusion, predominantly adjacent to right atrium. No evidence of hemodynamic compromise with normal respiratory variation and no chamber collapse. Prominent epicardial fat.    Compared with previous study of 4/10/24, small pericardial effusion persists.      TTE (4/10/2024, personally reviewed):  1. Normal left ventricular cavity size with mild concentric left ventricular hypertrophy. Normal systolic function. EF: 60%. No regional wall motion abnormalities. Cannot determine diastolic function. Global longitudinal strain not reported due to technical limitations of study.   2. Aortic valve has three cusps. Trace aortic regurgitation. Aortic valve and root sclerosis.  3. Thickened mitral valve leaflets. Trace mitral valve regurgitation. Normal left atrial size.   4. Normal right ventricular structure and function. Trace tricuspid valve regurgitation with estimated RVSP: 51 mmHg (assuming right atrial pressure of 3 mmHg). Normal right atrial size. Pulmonic valve structurally normal. Trace pulmonic valve regurgitation. Pulsed wave Doppler of the RVOT systolic profile shows decreased acceleration times and geometry consistent with mildly elevated PVR.  5. IVC size is normal with > 50% inspiratory collapse consistent with normal right atrial pressure. Small-to-moderate sized, circumferential pericardial effusion. No echocardiographic evidence for cardiac tamponade.   6. Compared to previous study from 3/4/2024, no significant change. Pericardial effusion size is similar, estimated right atrial pressure lower.           CTA Abd / Pelvis (3/13/2024, personally reviewed):   There is dilatation of the of proximal/mid small bowel with relatively abrupt  transition left hemiabdomen, likely obstruction. Questionable lesion at the  transition point. This could be better evaluated with CT or MRI enterography.     Aorta and major branches patent. Superior mesenteric artery patent without  significant stenosis.  Suspect moderate stenosis of the origins of the of renal arteries bilaterally.     Large pericardial effusion, no change.     Patchy opacities lower lungs, similar to prior.      Coronary CTA  (3/11/2024):   1. Two-vessel coronary artery disease as above that is moderate in severity.  CT  FFR is normal..  2. There is mitral annular calcification and aortic sclerosis.  The right atrium  is enlarged.  There is left ventricular hypertrophy.  There is a moderate to  large circumferential pericardial effusion.  3. Normal left ventricular wall motion and systolic function.  4. Coronary calcium score 313, 96th percentile     TTE (3/4/2024, personally reviewed):   Normal-sized left ventricle. Mild left ventricular hypertrophy. Preserved systolic function. LVEF 60%. No regional wall motion abnormalities. Grade II diastolic dysfunction.  Normal global longitudinal strain (-20%).  The aortic valve is not well seen.  Cannot determine the number of cusps.  Sclerotic leaflets.  No aortic stenosis.  Trace aortic insufficiency.  Normal sized aortic root.  The visualized portion of the ascending aorta is normal in size.  The mitral valve leaflets are thickened. Mild mitral annular calcification. Trace mitral regurgitation.   Mildly dilated left atrium.  Normal-sized right ventricle. Normal right ventricular systolic function.  Thickened tricuspid valve. There is mild tricuspid regurgitation with estimated RVSP of 46 mmHg.   Pulmonic valve is not well visualized. Trace pulmonic regurgitation.   Mildly dilated right atrium.  IVC is enlarged with <50% respiratory variation consistent with elevated right atrial filling pressure (at least 15 mmHg).   Moderate, circumferential, circumferential pericardial effusion.  The largest pocket is located inferolaterally.  Elevated right atrial pressure.  No other clear echocardiographic features of increased pericardial pressure.  Correlate clinically.   Compared to previous TTE from 6/21/2023, pericardial effusion now moderate in size.  Right atrial pressure now elevated.         CTA Chest (3/3/2024):   IMPRESSION:  1.  No evidence of acute pulmonary embolism to the level of the  segmental  branches.  2.  Moderate to large pericardial effusion measuring higher than simple fluid  density.  3.  Lower lobe lung field predominant interstitial thickening with some relative  subpleural sparing, consistent with a chronic interstitial lung disease,  unchanged from the prior study.  4.  Continued bilateral axillary, mediastinal, and bilateral hilar  lymphadenopathy, not definitely changed from the prior study, possibly related  to the patient's sarcoid.  5.  Emphysema.      CTA Chest PE (10/4/2023):  IMPRESSION:  1. No evidence for filling defect or vessel cutoff to suggest pulmonary  embolism.  Enlargement of the pulmonary arteries compatible with pulmonary  arterial hypertension.  2. Changes throughout the lungs as described above which can be seen with  systemic sclerosis/scleroderma.  Underlying pneumonia the lung bases cannot be  entirely excluded in the proper clinical setting.  3.  Additional stable findings as described above.        VQ (9/12/2023):  IMPRESSION:  Intermediate to high probability of pulmonary embolic disease.     6MWT (7/25/2023, on Sildenafil 20 mg TID):        TTE (6/21/2023, personally reviewed):  1. Normal left ventricular cavity size and mild concentric left ventricular hypertrophy. Normal systolic function. EF: 60%. No regional wall motion abnormalities. Grade I diastolic dysfunction.   2. Aortic valve cusps not well visualized. Trace aortic regurgitation. Aortic valve and root sclerosis.  3. Thickened mitral valve leaflets. Mild mitral annular calcification. Trace mitral regurgitation. Mildly dilated left atrial size.   4. Normal right ventricular structure and function. Mild tricuspid valve regurgitation with estimated RVSP: 55 mmHg (assuming right atrial pressure of 3 mmHg). Normal right atrial size. Pulmonic valve not well visualized. Trace pulmonic valve regurgitation. Pulsed wave Doppler of the RVOT systolic profile show decreased acceleration times ( msec)  and late systolic notching consistent with elevated PVR.   5. IVC size is normal with > 50% inspiratory collapse consistent with normal right atrial pressure. Small pericardial effusion without echocardiographic evidence of tamponade.  6. Compared to previous study from 4/14/2023, estimated right ventricular systolic pressure is higher.        TTE (4/14/2023, personally reviewed):   The left ventricular cavity size is normal with mild left ventricular hypertrophy and preserved systolic function. Estimated EF 65%. No regional wall motion abnormalities. Grade I diastolic dysfunction.     The aortic valve is tricuspid. Aortic valve sclerosis. Trace aortic regurgitation.      The visualized portions of the aortic root and ascending aorta are normal in size.     The mitral valve is mildly thickened. Mild mitral annular calcification. Trace mitral regurgitation.       The left atrium is severely dilated.      The right ventricle is normal in size with preserved systolic function     The pulmonic valve is not well seen.     The tricuspid valve is normal in appearance. mild tricuspid regurgitation with an estimated RVSP of 34 mmHg.       Right atrium is normal in size.     The IVC is small and collapses > 50% with inspiration consistent with normal right atrial pressures.     Small pericardial effusion, mostly posterior.       Compared to previous echocardiogram from 8/22/2022, there is no significant change        TTE (11/28/2022, personally reviewed):   Normal right- and mildly elevated left-sided filling pressures (RA: 3 mmHg, PCWP: 13 mmHg)  Elevated pulmonary artery pressures (60/22(35 mmHg)) in the setting of PCWP: 13 mmHg.   Normal cardiac output and index (CO: 5.1 L/min, CI: 3.2 L/min/m2)  PVR: 4.3 Wood units. SVR: 1840 dynes/sec/cm5      TTE (8/22/2022, personally reviewed):  Normal-sized LV. Normal LV systolic function. Estimated EF 55- 60%. Wall motion appears grossly normal. Mild concentric left ventricular  hypertrophy. Grade I LV diastolic dysfunction.  Pulmonic valve not well visualized. Grossly normal pulmonic valve structure. Trace pulmonic valve regurgitation. No pulmonic valve stenosis.  Aortic valve not well visualized. Aortic valve not well seen but likely trileaflet. Focal aortic valve calcification present. Sclerotic aortic valve leaflets. Mild aortic valve regurgitation. No aortic valve stenosis.  RV not well visualized. Normal-sized RV. Normal RV systolic function.  Normal-sized RA.  There is a small loculated pericardial effusion anterior to the right atrium. No cardiac tamponade.  Sclerotic mitral valve. Mitral valve calcification of the anterior leaflet present.  Trace mitral valve regurgitation.  No significant mitral valve stenosis.  Normal-sized IVC. IVC demonstrates normal respiratory collapse.  Tricuspid valve structure is grossly normal. Mild to moderate tricuspid valve regurgitation.  Estimated RVSP = 50-55 mmHg.  Mildly dilated LA.  No previous echocardiogram available for comparison.     TTE (4/21/2022, outside study):  This result has an attachment that is not available.     A complete transthoracic echocardiogram (including 2D, color flow   Doppler, spectral Doppler and M-mode imaging) was performed using the   standard protocol. The study quality was adequate.     The left ventricle is normal in size. There is moderately increased   wall thickness consistent with moderate concentric hypertrophy.   Endocardium is incompletely visualized, but no regional wall motion   abnormalities are noted in the visualized segments. Normal left   ventricular ejection fraction. The left ventricular ejection fraction by   visual estimate is 55% to 60%.     The right ventricle is normal in size. There is normal function of the   right ventricle.     The left atrium is normal in size. Left atrial volume is 58 mL.     The right atrial volume measures 52 mL. The right atrial volume index   measures 34 mL/m2.      There is trace mitral regurgitation. There is no mitral stenosis.     The aortic valve is trileaflet. There is mild aortic valve thickening.   There is no aortic stenosis. There is trace aortic regurgitation.     There is mild to moderate tricuspid regurgitation. Pulmonary artery   systolic pressure measures 52 mmHg. The pulmonary artery systolic pressure   is moderately elevated.     The aorta measures 3.2 cm at the sinus of Valsalva. The proximal   segment of the ascending aorta is mildly enlarged. The proximal segment of   the ascending aorta measures 3.4 cm. The proximal segment of the ascending   aorta measures 2.2 cm/m2, indexed for body surface area.     Trivial pericardial effusion present. There is no echocardiographic   evidence of cardiac tamponade.     The inferior vena cava is normal in size (diameter <21 mm) and   decreases >50% in size with inspiration, suggesting a normal right atrial   pressure of 3 mmHg (range 0-5 mm Hg).     Compared to the prior study of 3/13/2020, there are no significant   changes.        PFT (3/23/2022):      PFT (7/14/2021):      CT Chest (5/2/2021, outside study):  CLINICAL INFORMATION: Systemic sclerosis. Interstitial lung disease. Groundglass nodule     PROCEDURE: Unenhanced CT of the chest was performed using thin section reconstructions. Images were obtained on inspiration and expiration.     COMPARISON: June and August 2020     FINDINGS:     LUNGS, PLEURA:     Groundglass opacities with reticulation with subpleural sparing in the lower lobes, without honeycombing or architectural distortion, unchanged.     Right upper lobe groundglass nodule, image 114 of series 3, 8 x 11 mm, previously 6 mm.     Expiratory images are of diagnostic quality and do not demonstrate evidence of clinically significant air trapping.     CARDIOVASCULAR, MEDIASTINUM, THYROID: Coronary calcifications. Trace pericardial effusion.     LYMPH NODES: Subcentimeter mediastinal lymph nodes, unchanged.  Unchanged axillary lymph nodes.     SKELETON, CHEST WALL: No destructive bone lesion     UPPER ABDOMEN: Patulous esophagus. 3 mm right renal stone.       RHC (9/02/2020):  RA   8 mmHg   RV   40/5 mmHg   PA (mean)  44/16 (25) mmHg   PCWP   12 mmHg   CO   4.65 lpm (Thermodilution)   CI   2.65 lpm/m-squared   SVI    33 ml/beat/m-squared (lower limit normal 29)   PVR   2.8 mmHg/l/min (Wood units)   SVR   2064 dyne-sec-cm-5         PFT (3/18/2020):      TTE (3/13/2020, outside study):  · The study quality was good.   · The left ventricle is normal in size. Top normal left ventricular wall   thickness. There are no segmental wall motion abnormalities. The left   ventricular ejection fraction is 55-60% by visual estimate and 3D   echocardiography. Normal left ventricular ejection fraction.   · There is grade I (mild) LV diastolic dysfunction.   · The right ventricle is normal in size. There is normal function of the   right ventricle.   · The right atrium is mildly dilated.   · There is mild mitral valve anterior leaflet thickening. There is mild   mitral valve leaflet calcification. There is flattening of the mitral   leaflets without raphael prolapse. There is trace mitral regurgitation.   · The aortic valve is trileaflet. There is mild thickening of the aortic   valve. There is mild calcification of the aortic valve. There is no aortic   /stenosis. There is trace aortic regurgitation.   · There is mild tricuspid valve leaflet thickening. There is mild to   moderate tricuspid regurgitation. The pulmonary artery systolic pressure   is moderately elevated. Pulmonary artery systolic pressure measures 50   mmHg. There is mid-systolic notching of the RVOT VTI consistent with   elevated pulmonary vascular resistance.   · The inferior vena cava is normal in size (diameter <21 mm) and decreases   >50% in size with inspiration, suggesting a normal right atrial pressure   of 3 mmHg (range 0-5 mm Hg).   · Trivial loculated  pericardial effusion present adjacent to the right   ventricle and adjacent to the right atrium.          Assessment / Plan:     1. Chest Pain:  - Given pattern and character, I believe this is musculoskeletal / serous pain from her autoimmune disease. There may be an element of myopericarditis.  - She reports that pain has not responded to increased in steroids in the past   - Non-ACS chest pain and known non-obstructive CAD as recently as March 2024 CCTA.   -A lidocaine patch was placed just prior to me entering the room.  Hopefully this will help her pain.    2. Pulmonary Hypertension (WHO Group I, NYHA/WHO FC III):   - Occurring in the background of Systemic Scleroderma with ILD. August 2022 TTE showed normal RV size and function, but progressive exertional decline, worsened DLCO and resting tachycardia suggest there may be progression of her pulmonary vascular disease and limitation of cardiac output. This was proven with 11/2022 RHC showing mean PA 35 mmHg (with PCWP 13 mmHg) and PVR 4.3 Wood units. Recent TTEs have shown appropriate RV:PA coupling with normal RV size and systolic function.   - She is hypervolemic on exam  - CXR improved today  -Recommend to resume diuresis, ethacrynic acid 50 mg p.o. daily when tolerating PO, has been nauseous and not eating/drinking much. This is higher than home dose so hopefully will achieve diuresis.  Please continue daily standing weights.  Please start recording I's and O's.    - Continue Sildenafil 20 mg TID   - She should continue uninterrupted Macitentan 10 mg daily -her home medication has been brought in and she is receiving it.    3. HTN:   - Continue Amlodipine.     4. Systemic Scleroderma / Raynaud's Disease:   - Per Rheumatology (Dr. Trevizo).      5. ILD:   - Per Pulmonology and Rheumatology (Dr. Trevizo). She has not tolerated trials of Mycophenolate. She briefly received Tocilizumab.     6. PE:   - She has had elevated probability on V/Q scans, but CTA Chest  has consistently not shown evidence for acute or chronic thromboembolism. Apixaban has been discontinued.     7. CAD:   - Two-vessel non-obstructive disease on March 2024 CCTA. She is continued on Atorvastatin 40 mg daily.     8. Toe Ulcerations  - Per Rheumatology, unlikely to see LE ulcerations with scleroderma. S/P left PTA dSFA/TP and DCB to popliteal arteries 8/14/24. Followed by vascular surgery. Started on Plavix which is currently being held due to hematoma found on CT scan along with hemoglobin drop.    9. Lung Mass  - Pathology from 8/8 biopsy with atypical glandular cells, possibly consistent with adenocarcinoma  - Management per pulmonology    KARYNA Carmona  8/19/2024

## 2024-08-20 ENCOUNTER — TELEPHONE (OUTPATIENT)
Dept: PAIN MEDICINE | Facility: CLINIC | Age: 64
End: 2024-08-20
Payer: COMMERCIAL

## 2024-08-20 LAB
ANION GAP SERPL CALC-SCNC: 8 MEQ/L (ref 3–15)
BASOPHILS # BLD: 0.04 K/UL (ref 0.01–0.1)
BASOPHILS NFR BLD: 0.5 %
BUN SERPL-MCNC: 30 MG/DL (ref 7–25)
CALCIUM SERPL-MCNC: 9.1 MG/DL (ref 8.6–10.3)
CHLORIDE SERPL-SCNC: 105 MEQ/L (ref 98–107)
CO2 SERPL-SCNC: 26 MEQ/L (ref 21–31)
CREAT SERPL-MCNC: 1.2 MG/DL (ref 0.6–1.2)
DIFFERENTIAL METHOD BLD: ABNORMAL
EGFRCR SERPLBLD CKD-EPI 2021: 51 ML/MIN/1.73M*2
EOSINOPHIL # BLD: 0.33 K/UL (ref 0.04–0.36)
EOSINOPHIL NFR BLD: 3.8 %
ERYTHROCYTE [DISTWIDTH] IN BLOOD BY AUTOMATED COUNT: 19.9 % (ref 11.7–14.4)
GLUCOSE SERPL-MCNC: 82 MG/DL (ref 70–99)
HCT VFR BLD AUTO: 27.4 % (ref 35–45)
HGB BLD-MCNC: 7.8 G/DL (ref 11.8–15.7)
IMM GRANULOCYTES # BLD AUTO: 0.06 K/UL (ref 0–0.08)
IMM GRANULOCYTES NFR BLD AUTO: 0.7 %
LYMPHOCYTES # BLD: 1.31 K/UL (ref 1.2–3.5)
LYMPHOCYTES NFR BLD: 15.3 %
MAGNESIUM SERPL-MCNC: 1.9 MG/DL (ref 1.8–2.5)
MCH RBC QN AUTO: 24.1 PG (ref 28–33.2)
MCHC RBC AUTO-ENTMCNC: 28.5 G/DL (ref 32.2–35.5)
MCV RBC AUTO: 84.6 FL (ref 83–98)
MONOCYTES # BLD: 0.78 K/UL (ref 0.28–0.8)
MONOCYTES NFR BLD: 9.1 %
NEUTROPHILS # BLD: 6.07 K/UL (ref 1.7–7)
NEUTS SEG NFR BLD: 70.6 %
NRBC BLD-RTO: 0 %
PDW BLD AUTO: 12.2 FL (ref 9.4–12.3)
PLATELET # BLD AUTO: 213 K/UL (ref 150–369)
POTASSIUM SERPL-SCNC: 4.8 MEQ/L (ref 3.5–5.1)
RBC # BLD AUTO: 3.24 M/UL (ref 3.93–5.22)
SODIUM SERPL-SCNC: 139 MEQ/L (ref 136–145)
WBC # BLD AUTO: 8.59 K/UL (ref 3.8–10.5)

## 2024-08-20 PROCEDURE — 80048 BASIC METABOLIC PNL TOTAL CA: CPT | Performed by: STUDENT IN AN ORGANIZED HEALTH CARE EDUCATION/TRAINING PROGRAM

## 2024-08-20 PROCEDURE — 63700000 HC SELF-ADMINISTRABLE DRUG: Performed by: INTERNAL MEDICINE

## 2024-08-20 PROCEDURE — 83735 ASSAY OF MAGNESIUM: CPT | Performed by: STUDENT IN AN ORGANIZED HEALTH CARE EDUCATION/TRAINING PROGRAM

## 2024-08-20 PROCEDURE — 21400000 HC ROOM AND CARE CCU/INTERMEDIATE

## 2024-08-20 PROCEDURE — 63600000 HC DRUGS/DETAIL CODE: Mod: JZ | Performed by: NURSE PRACTITIONER

## 2024-08-20 PROCEDURE — 63600000 HC DRUGS/DETAIL CODE: Mod: JZ | Performed by: INTERNAL MEDICINE

## 2024-08-20 PROCEDURE — 63600000 HC DRUGS/DETAIL CODE: Mod: JZ | Performed by: STUDENT IN AN ORGANIZED HEALTH CARE EDUCATION/TRAINING PROGRAM

## 2024-08-20 PROCEDURE — 99233 SBSQ HOSP IP/OBS HIGH 50: CPT | Performed by: STUDENT IN AN ORGANIZED HEALTH CARE EDUCATION/TRAINING PROGRAM

## 2024-08-20 PROCEDURE — 36415 COLL VENOUS BLD VENIPUNCTURE: CPT | Performed by: INTERNAL MEDICINE

## 2024-08-20 PROCEDURE — 85025 COMPLETE CBC W/AUTO DIFF WBC: CPT | Performed by: INTERNAL MEDICINE

## 2024-08-20 PROCEDURE — 99233 SBSQ HOSP IP/OBS HIGH 50: CPT | Performed by: INTERNAL MEDICINE

## 2024-08-20 PROCEDURE — 63700000 HC SELF-ADMINISTRABLE DRUG: Performed by: NURSE PRACTITIONER

## 2024-08-20 PROCEDURE — 63700000 HC SELF-ADMINISTRABLE DRUG: Performed by: STUDENT IN AN ORGANIZED HEALTH CARE EDUCATION/TRAINING PROGRAM

## 2024-08-20 PROCEDURE — 99233 SBSQ HOSP IP/OBS HIGH 50: CPT | Performed by: NURSE PRACTITIONER

## 2024-08-20 RX ORDER — POLYETHYLENE GLYCOL 3350 17 G/17G
17 POWDER, FOR SOLUTION ORAL 2 TIMES DAILY PRN
Status: DISCONTINUED | OUTPATIENT
Start: 2024-08-20 | End: 2024-08-22

## 2024-08-20 RX ORDER — ONDANSETRON 4 MG/1
4 TABLET, ORALLY DISINTEGRATING ORAL EVERY 6 HOURS PRN
Status: DISCONTINUED | OUTPATIENT
Start: 2024-08-20 | End: 2024-08-23

## 2024-08-20 RX ORDER — ONDANSETRON HYDROCHLORIDE 2 MG/ML
4 INJECTION, SOLUTION INTRAVENOUS EVERY 6 HOURS PRN
Status: DISCONTINUED | OUTPATIENT
Start: 2024-08-20 | End: 2024-08-23

## 2024-08-20 RX ORDER — SENNOSIDES 8.6 MG/1
2 TABLET ORAL 2 TIMES DAILY PRN
Status: DISCONTINUED | OUTPATIENT
Start: 2024-08-20 | End: 2024-08-22

## 2024-08-20 RX ORDER — GABAPENTIN 100 MG/1
100 CAPSULE ORAL 3 TIMES DAILY
Status: DISCONTINUED | OUTPATIENT
Start: 2024-08-20 | End: 2024-08-22

## 2024-08-20 RX ORDER — HYDROMORPHONE HYDROCHLORIDE 2 MG/1
2 TABLET ORAL EVERY 4 HOURS PRN
Status: DISCONTINUED | OUTPATIENT
Start: 2024-08-20 | End: 2024-09-03 | Stop reason: HOSPADM

## 2024-08-20 RX ADMIN — GLYCERIN 1 DROP: .002; .002; .01 SOLUTION/ DROPS OPHTHALMIC at 08:33

## 2024-08-20 RX ADMIN — HYDROMORPHONE HYDROCHLORIDE 2 MG: 2 TABLET ORAL at 22:21

## 2024-08-20 RX ADMIN — MAGNESIUM SULFATE IN DEXTROSE 1 G: 10 INJECTION, SOLUTION INTRAVENOUS at 10:56

## 2024-08-20 RX ADMIN — LIDOCAINE 4% 1 PATCH: 40 PATCH TOPICAL at 08:24

## 2024-08-20 RX ADMIN — DEXTRAN 70, GLYCERIN, HYPROMELLOSE 1 DROP: 1; 2; 3 SOLUTION/ DROPS OPHTHALMIC at 08:44

## 2024-08-20 RX ADMIN — LIDOCAINE 4% 1 PATCH: 40 PATCH TOPICAL at 08:20

## 2024-08-20 RX ADMIN — ONDANSETRON 4 MG: 4 TABLET, ORALLY DISINTEGRATING ORAL at 17:57

## 2024-08-20 RX ADMIN — WHITE PETROLATUM: 1.75 OINTMENT TOPICAL at 08:33

## 2024-08-20 RX ADMIN — SALINE NASAL SPRAY 2 SPRAY: 1.5 SOLUTION NASAL at 08:33

## 2024-08-20 RX ADMIN — ONDANSETRON 4 MG: 2 INJECTION INTRAMUSCULAR; INTRAVENOUS at 05:38

## 2024-08-20 RX ADMIN — ACETAMINOPHEN 975 MG: 325 TABLET ORAL at 17:56

## 2024-08-20 RX ADMIN — ACETAMINOPHEN 975 MG: 325 TABLET ORAL at 05:49

## 2024-08-20 RX ADMIN — BENZOCAINE 6 MG-MENTHOL 10 MG LOZENGES 1 LOZENGE: at 09:47

## 2024-08-20 RX ADMIN — HYDROMORPHONE HYDROCHLORIDE 2 MG: 2 TABLET ORAL at 17:57

## 2024-08-20 RX ADMIN — ACETAMINOPHEN 975 MG: 325 TABLET ORAL at 10:55

## 2024-08-20 RX ADMIN — WHITE PETROLATUM: 1.75 OINTMENT TOPICAL at 19:28

## 2024-08-20 RX ADMIN — GABAPENTIN 100 MG: 100 CAPSULE ORAL at 19:27

## 2024-08-20 RX ADMIN — LIDOCAINE 4% 1 PATCH: 40 PATCH TOPICAL at 08:25

## 2024-08-20 RX ADMIN — GLYCERIN 1 DROP: .002; .002; .01 SOLUTION/ DROPS OPHTHALMIC at 17:59

## 2024-08-20 RX ADMIN — CYANOCOBALAMIN TAB 1000 MCG 1000 MCG: 1000 TAB at 08:17

## 2024-08-20 RX ADMIN — FENTANYL CITRATE 25 MCG: 0.05 INJECTION, SOLUTION INTRAMUSCULAR; INTRAVENOUS at 03:38

## 2024-08-20 RX ADMIN — DEXTRAN 70, GLYCERIN, HYPROMELLOSE 1 DROP: 1; 2; 3 SOLUTION/ DROPS OPHTHALMIC at 18:01

## 2024-08-20 RX ADMIN — AMLODIPINE BESYLATE 10 MG: 10 TABLET ORAL at 08:17

## 2024-08-20 RX ADMIN — ETHACRYNIC ACID 50 MG: 25 TABLET ORAL at 08:17

## 2024-08-20 RX ADMIN — ACETAMINOPHEN 975 MG: 325 TABLET ORAL at 22:21

## 2024-08-20 RX ADMIN — CARBOXYMETHYLCELLULOSE SODIUM 1 DROP: 5 SOLUTION/ DROPS OPHTHALMIC at 08:17

## 2024-08-20 RX ADMIN — PANTOPRAZOLE SODIUM 20 MG: 20 TABLET, DELAYED RELEASE ORAL at 08:17

## 2024-08-20 RX ADMIN — SILDENAFIL 20 MG: 20 TABLET, FILM COATED ORAL at 08:17

## 2024-08-20 RX ADMIN — HYDROMORPHONE HYDROCHLORIDE 2 MG: 2 TABLET ORAL at 11:55

## 2024-08-20 RX ADMIN — FENTANYL CITRATE 25 MCG: 0.05 INJECTION, SOLUTION INTRAMUSCULAR; INTRAVENOUS at 08:22

## 2024-08-20 RX ADMIN — SILDENAFIL 20 MG: 20 TABLET, FILM COATED ORAL at 13:14

## 2024-08-20 RX ADMIN — SILDENAFIL 20 MG: 20 TABLET, FILM COATED ORAL at 19:27

## 2024-08-20 RX ADMIN — CLOPIDOGREL 75 MG: 75 TABLET ORAL at 08:17

## 2024-08-20 RX ADMIN — LEVOTHYROXINE SODIUM 100 MCG: 0.1 TABLET ORAL at 05:50

## 2024-08-20 RX ADMIN — GABAPENTIN 100 MG: 100 CAPSULE ORAL at 10:56

## 2024-08-20 ASSESSMENT — COGNITIVE AND FUNCTIONAL STATUS - GENERAL
MOVING TO AND FROM BED TO CHAIR: 3 - A LITTLE
CLIMB 3 TO 5 STEPS WITH RAILING: 2 - A LOT
WALKING IN HOSPITAL ROOM: 3 - A LITTLE
STANDING UP FROM CHAIR USING ARMS: 3 - A LITTLE

## 2024-08-20 NOTE — PROGRESS NOTES
"Cardiology/pulmonary hypertension service progress note    Interval History:      Awake in bed, same issues today.  Chest wall pain, right groin pain, left foot pain.  Also reports now a new pain at the level of the left ankle.  Notes pain meds make her nauseous and \"don't help anyway\".  Still being weighed with bed scale, no standing weight since Friday.        Background per Dr. Arvizu:    Ms. Felix is a 63 y.o. female with a past medical history of Pulmonary HTN, HTN, Raynaud's Disease, Cutaneous Systemic Scleroderma (dx 1998), ILD, PE (3/2023). She is a patient of Dr. Nguyễn. She was previously followed by Dr. Jos Valdez at [facility]. Echocardiogram from 4/21/2022 showed LVEF: 55-60%, mild aortic valve thickening, pulmonary artery systolic pressure: 52 mmHg and trivial pericardial effusion. LHC and RHC (separate occasions) over the last 5-10 years which showed normal hemodynamics and normal coronaries. She had a RHC 9/02/2020 showing top-normal right sided filling pressures with mild pulmonary hypertension, top-normal PVR and normal pulmonary capillary wedge pressure. The cardiac index was normal, seen below.     On 6/27/2022, she was in [facility] ER for chest pain. EKG: NSR without ischemic changes. hsTrop: 12->16, d-dimer: 0.56, CXR showed cardiomegaly and diffuse interstitial prominence.     Dr. Nguyễn ordered echocardiogram, which was completed on 8/22/2022 and showed estimated RVSP = 50-55 mmHg, normal-sized LV with normal LV systolic function. Estimated EF 55- 60%. Wall motion grossly normal, mild concentric left ventricular hypertrophy, grade I LV diastolic dysfunction, probably normal RV size and function.    At her initial visit on 10/11/2022, she reported that she felt very short of breath with minimal activity most of the time. She reports that this started about 1 year prior and had progressively gotten worse. She described PND and bendopnea. She reported that she experienced ankle swelling, worsened " over the past year and usually worse towards the end of the night. She also reported some swelling in her abdomen. She reported daily palpations. I recommended a RHC which was done 11/28/2022 and showed: Normal right sided filling pressures. Mildly elevated left sided filling pressures. Precapillary pulmonary hypertension with mean PA 36 mmHg.    She was hospitalized on 3/19/2023 at [facility] for PE diagnosed on V/Q scan. She reports that she had presented with left arm pain and heaviness. She states that she was started on Apixaban. One week after her last office visit (4/6/2023), [place] [facility] [place], epistaxis and hemoptysis. Echocardiogram showed: left ventricular cavity size normal with mild left ventricular hypertrophy and preserved systolic function. Estimated EF 65%. Chest CTA showed no evidence of PE; Apixaban was discontinued.     She had a repeat echocardiogram (6/2023) which showed: Normal left ventricular cavity size and mild concentric left ventricular hypertrophy. Normal systolic function. EF: 60%. Normal right ventricular structure and function. Mild tricuspid valve regurgitation with estimated RVSP: 55 mmHg. Compared to previous study from 4/14/2023, estimated right ventricular systolic pressure were higher.    She underwent V/Q scan in September 2023, which showed intermediate-to-high probability of pulmonary embolic disease, as a result she completed CTA Chest PE which showed: no evidence for filling defect or vessel cutoff to suggest pulmonary embolism. Enlargement of the pulmonary arteries compatible with pulmonary arterial hypertension was seen.    She reports she started Macitentan around August or September 2023. She stated that she had not been checking her SpO2. She reports she did not notice any difference since starting it. She reported she had been feeling more shortness of breath at times with ADLs.     [place] [facility] [place] with chest  pressure, palpitations and nausea. She was noted to have hypoxia and tachypnea at presentation. She was found to have moderate-to-large pericardial effusion without tamponade. She was started on Colchicine 0.6 mg BID on 3/4/2024 and when she did not improve symptomatically, Prednisone 20 mg daily was added on 3/5/2024. On 3/7/2024, she developed multiple episodes of diarrhea and Colchicine dose was decreased to 0.3 mg. Coronary CTA, done as part of the ischemic workup, showed moderate 2 vessel CAD. She was recommended to begin Atorvastatin 20 mg daily and Clopidogrel 75 mg daily. She initially refused those two medications, but eventually agreed to take Atorvastatin.      She was discharged on Colchicine 0.3 mg for at least 3 months and Prednisone 20 mg for a total two week course until 3/19/2024, then decrease the dose to 10 mg daily for 2 weeks until 4/2/2024, then decrease dose to 5 mg daily for 2 weeks until 4/16/2024. She reports she tapered off Prednisone as instructed and has continued to take Colchicine as prescribed. She completed an echocardiogram (4/10/2024) which showed: Small-to-moderate sized, circumferential pericardial effusion. No echocardiographic evidence for cardiac tamponade. Compared to previous study from 3/4/2024, no significant change. Pericardial effusion size was similar, estimated right atrial pressure lower.      She presented to [facility] (4/16/2024) with increased pleuritic chest pain. On presentation to the ER, she was noted to be hypertensive and tachycardic. Labs showed mildly elevated troponin, elevated BNP and leukocytosis. CT angiography of the chest did not show evidence of PE. It was read as large pericardial effusion, evidence of emphysema, evidence of pulmonary hypertension, 16 mm left lobe lung mass.       She presented again to [facility] on 5/21/2024 at the request of her Rheumatologist. During her visit there, she was noted to be tachycardic and with some shortness of breath. Her  "chest pain was improved from her prior admission. On arrival, O2 saturations were normal. ECG showed: Sinus Tachycardia (HR: 125 bpm). BNP >300, hsTrop 32 ->24. POCUS in the emergency department showed evidence of pericardial effusion with no tamponade physiology. CTA Chest did not show evidence of pulmonary embolism. There were bilateral changes radiographically concerning for volume overload. She received Methylpredinsolone 125 mg in ED. Of note, she was on Prednisone 10 mg daily at home. She was given IV diuresis. She was discharged on Furosemide 20 mg PO PRN daily for fluid weight gain / swelling.     She had brief hospitalization and was discharged on 7/23 for chest pain. She presented to the ED on 7/25/2024 for worsening of her chronic chest pain and 8/5/2024 for facial swelling and chronic chest pain.      She underwent bronchoscopic lung biopsy yesterday, 8/8/2024, with Dr. Niles Rodriguez. Pathology showed atypical glandular cells which may be consistent with adenocarcinoma in situ.     Post-procedure CXR showed increase pulmonary vascular congestion. She was admitted for optimization.       ---    Rheumatology: Dr. Trevizo  Pulmonology: Dr. Wasserman    Past Medical / Surgical History:  Pulmonary HTN, HTN, Raynaud's Disease, Cutaneous Systemic Scleroderma (dx 1998), ILD, PE (3/2023), Follicular Carcinoma of Thyroid, Tenosynovitis, de Quervain, h/o Hernia Repair, h/o Appendicitis, h/o Digit Amputation, H/o Total Thyroidectomy (Dr. Pacheco at [facility]; 4/2013)    Family & Social History: She is , she has two children. She has worked as an .     Tobacco: former 2005  EtOH: never   Illicits: never     Her brother has CAD with stent  1st cousin with SLE  Both parents had \"heart problems\"    Allergies:   Allergies   Allergen Reactions    Aspirin Shortness of breath     Other reaction(s): Unknown  \"stop breathing\"      Ibuprofen Shortness of breath     Stop breathing    Iodine Shortness of breath     Other " reaction(s): Unknown    Iodine And Iodide Containing Products Shortness of breath     ? rash    Morphine Shortness of breath      Reported wamrth of face, improved with benadryl and cold compresses after getting morphine as well as episode of (subjective ) dyspnea, and thought she passed out - did have wamrth and erythema of face with mild edema R>L cheek notably on exam.     Penicillins Shortness of breath     Other reaction(s): Unknown    Sulfa (Sulfonamide Antibiotics) Angioedema    Clindamycin     Hydrochlorothiazide      Other reaction(s): Abdominal Pain   Slow heart rate    Oxycodone      Other reaction(s): Vomiting    Sulfamethoxazole-Trimethoprim      Other reaction(s): Vomiting, Weakness     Latex Rash       Medications:   Current Facility-Administered Medications   Medication Dose Route Frequency Provider Last Rate Last Admin    acetaminophen (TYLENOL) tablet 975 mg  975 mg oral q6h SPENCER Simon Cano DO   975 mg at 08/20/24 1055    amLODIPine (NORVASC) tablet 10 mg  10 mg oral q AM Simon Cano DO   10 mg at 08/20/24 0817    artificial tears (dextran-hpm-glyc) (GENTEAL TEARS) 0.1-0.3-0.2 % eye drops 1 drop  1 drop Both Eyes QID Simon Cano DO   1 drop at 08/20/24 0844    atorvastatin (LIPITOR) tablet 40 mg  40 mg oral Daily (6p) Simon Cano DO   40 mg at 08/19/24 1836    benzocaine-menthol (CEPACOL/CHLORASEPTIC) lozenge 1 lozenge  1 lozenge Mouth/Throat q2h PRN Simon Cano DO   1 lozenge at 08/20/24 0947    benzonatate (TESSALON) capsule 100 mg  100 mg oral 3x daily PRN Simon Cano DO   100 mg at 08/18/24 1131    carboxymethylcellulose (REFRESH PLUS) 0.5 % ophthalmic dropperette 1 drop  1 drop Right Eye 3x daily PRN Simon Cano DO   1 drop at 08/20/24 0817    clopidogreL (PLAVIX) tablet 75 mg  75 mg oral Daily Simon Cano DO   75 mg at 08/20/24 0817    cyanocobalamin (VITAMIN B12)  tablet 1,000 mcg  1,000 mcg oral Daily Simon Cano, DO   1,000 mcg at 08/20/24 0817    cyclobenzaprine (FLEXERIL) tablet 5 mg  5 mg oral 3x daily PRN Simon Cano DO   5 mg at 08/19/24 0610    glucose chewable tablet 16-32 g of dextrose  16-32 g of dextrose oral PRN Simon Cano DO        Or    dextrose 40 % oral gel 15-30 g of dextrose  15-30 g of dextrose oral PRN Simon Cano DO        Or    glucagon (GLUCAGEN) injection 1 mg  1 mg intramuscular PRN Simon Cano DO        Or    dextrose 50 % in water (D50) injection 12.5 g  25 mL intravenous PRN Simon Cano DO        diphenhydrAMINE (BENADRYL) capsule 50 mg  50 mg oral q8h PRN Simon Cano DO        docusate sodium (COLACE) capsule 100 mg  100 mg oral Daily PRN Simon Cano DO        ethacrynic acid (EDECRIN) tablet 50 mg  50 mg oral Daily Simon Cano DO   50 mg at 08/20/24 0817    gabapentin (NEURONTIN) capsule 100 mg  100 mg oral TID Molly Hidalgo CRNP   100 mg at 08/20/24 1056    honey (MEDIHONEY) 100 % topical paste   Topical Daily PRN Simon Cano DO   Given at 08/19/24 0830    HYDROmorphone (DILAUDID) tablet 2 mg  2 mg oral q4h PRN Molly Hidalgo CRNP   2 mg at 08/20/24 1155    levothyroxine (SYNTHROID) tablet 100 mcg  100 mcg oral Daily (6:30a) Simon Cano DO   100 mcg at 08/20/24 0550    lidocaine (ASPERCREME) 4 % topical patch 1 patch  1 patch Topical Daily Simon Cano DO   1 patch at 08/20/24 0825    lidocaine (ASPERCREME) 4 % topical patch 1 patch  1 patch Topical Daily Simon Cano DO   1 patch at 08/20/24 0824    lidocaine (ASPERCREME) 4 % topical patch 1 patch  1 patch Topical Daily Simon Cano DO   1 patch at 08/20/24 0820    macitentan (OPSUMIT) tablet 10 mg (Patient Owned)  10 mg oral Daily Simon Cano DO   10 mg at  08/20/24 0850    melatonin ODT 3 mg  3 mg oral Nightly PRN Simon Cano, DO   3 mg at 08/19/24 2125    ondansetron ODT (ZOFRAN-ODT) disintegrating tablet 4 mg  4 mg oral q6h PRN Molly Hidalgo CRNP        Or    ondansetron (ZOFRAN) injection 4 mg  4 mg intravenous q6h PRN Molly Hidalgo CRNP        pantoprazole (PROTONIX) tablet,delayed release (DR/EC) 20 mg  20 mg oral Daily Simon Cano, DO   20 mg at 08/20/24 0817    peg 400-hypromellose-glycerin (ARTIFICIAL TEARS) 1-0.2-0.2 % eye drops 1 drop  1 drop Both Eyes q4h while awake Simon Cano DO   1 drop at 08/20/24 0833    phenoL (SORE THROAT SPRAY) 1.4 % mucosal spray 1 spray  1 spray Mouth/Throat q2h PRN Simon Cano DO        polyethylene glycol (MIRALAX) 17 gram packet 17 g  17 g oral BID Maddy Cope MD        senna (SENOKOT) tablet 2 tablet  2 tablet oral Nightly Maddy Cope MD        sildenafiL (pulm.hypertension) (REVATIO) tablet 20 mg  20 mg oral TID Simon Cano DO   20 mg at 08/20/24 0817    sodium chloride (OCEAN) 0.65 % nasal spray 2 spray  2 spray Each Nostril Daily Simon Cano DO   2 spray at 08/20/24 0833    white petrolatum (AQUAPHOR) ointment   Topical q4h PRN Simon Cano DO   Given at 08/13/24 2117    white petrolatum (AQUAPHOR) ointment   Topical BID Simon Cano DO   Given at 08/20/24 0833    white petrolatum-mineral oil (ARTIFICIAL TEARS OINT) ophthalmic ointment   Both Eyes Nightly Simon Cano DO   Given at 08/18/24 2049       Review of Systems:   All other systems reviewed and are negative except as per HPI.    Physical Exam:     Wt Readings from Last 10 Encounters:   08/20/24 52.5 kg (115 lb 11.2 oz)   08/05/24 49.4 kg (109 lb)   08/05/24 49.4 kg (109 lb)   07/20/24 49.5 kg (109 lb 2 oz)   07/19/24 51.3 kg (113 lb)   06/20/24 51.7 kg (114 lb)   06/14/24 52.6 kg (116 lb)   05/23/24 52.4 kg (115 lb 9.6 oz)    04/17/24 53.5 kg (118 lb)   04/10/24 44.5 kg (98 lb)       BP Readings from Last 5 Encounters:   08/20/24 (!) 124/59   08/05/24 (!) 148/75   07/25/24 (!) 155/77   07/23/24 (!) 101/57   07/19/24 (!) 154/80       Vitals:    08/20/24 1115   BP: (!) 124/59   Pulse: 75   Resp: 18   Temp: 36.7 °C (98 °F)   SpO2: 100%       Constitutional: NAD, AAOx3  HENT: Normocephalic, atraumatic head, sclerae anicteric, no cervical lymphadenopathy, trachea midline, facial swelling  Cardiovascular: RRR, no murmurs, rubs or gallops, JVP: 10 cm H2O   cm H2O, no carotid bruits.  Respiratory: CTA bilateral lung fields, no wheezes, rales or rhonchi  GI: soft, non-tender/non-distended  Musculoskeletal / Extremities: Bilateral hip edema noted, +2 pulses bilateral radial, DP/PT arteries, skin tightening on face and fingertips, ulceration and discoloration of 2nd phalange b/l LE.   Skin: no rashes. Facial changes c/w scleroderma  Neuro / Psych: no focal deficits, CNII-XII grossly intact, appropriate, cooperative    Diagnostic Data:     Component      Latest Ref Rng 7/20/2024 7/25/2024   Sodium      136 - 145 mEQ/L 142  139    Potassium, Bld      3.5 - 5.1 mEQ/L 4.2  4.3    Chloride      98 - 107 mEQ/L 107  103    CO2      21 - 31 mEQ/L 25  27    BUN      7 - 25 mg/dL 17  17    Creatinine      0.6 - 1.2 mg/dL 1.2  1.1    Glucose      70 - 99 mg/dL 85  86    Calcium      8.6 - 10.3 mg/dL 9.3  10.6 (H)    AST (SGOT)      13 - 39 IU/L  15    ALT (SGPT)      7 - 52 IU/L  8    Alkaline Phosphatase      34 - 125 IU/L  82    Total Protein      6.0 - 8.2 g/dL  8.8 (H)    Albumin      3.5 - 5.7 g/dL  4.3    Bilirubin, Total      0.3 - 1.2 mg/dL  0.4    eGFR      >=60.0 mL/min/1.73m*2 51.0 (L)  56.6 (L)    Anion Gap      3 - 15 mEQ/L 10  9       Component      Latest Ref Rng 7/20/2024 7/25/2024   WBC      3.80 - 10.50 K/uL 7.27  10.29    Hemoglobin      11.8 - 15.7 g/dL 9.8 (L)  11.2 (L)    Hematocrit      35.0 - 45.0 % 32.0 (L)  37.3    MCV      83.0 -  98.0 fL 80.4 (L)  80.4 (L)    Platelets      150 - 369 K/uL 206  331       Component      Latest Ref Rng 7/19/2024 7/25/2024 8/9/2024   High Sens Troponin I      <15.0 pg/mL 89.2 (HH)  66.8 (HH)  92.5 (HH)    High Sens Troponin I       90.3 (HH)  83.4 (HH)         Component      Latest Ref Rn 5/22/2024 6/22/2024 7/19/2024 7/25/2024   BNP      <=100 pg/mL 352 (H)  150 (H)  151 (H)  220 (H)       Component      Latest Ref Rn 4/16/2024 5/23/2024 7/21/2024 7/22/2024   Sed Rate      0 - 30 mm/hr 101 (H)  79 (H)  105 (H)  >119 (H)       Component      Latest Ref Rn 4/18/2024 5/24/2024   WBC      3.80 - 10.50 K/uL 8.40  16.22 (H)    RBC      3.93 - 5.22 M/uL 3.54 (L)  3.82 (L)    Hemoglobin      11.8 - 15.7 g/dL 9.8 (L)  10.3 (L)    Hematocrit      35.0 - 45.0 % 32.8 (L)  33.9 (L)    Platelets      150 - 369 K/uL 254  289       Component      Latest Ref [facility] 5/24/2024   Magnesium      1.8 - 2.5 mg/dL 2.1      Component      Latest Ref [facility] 4/13/2023 3/7/2024 5/22/2024   TSH      0.34 - 5.60 mIU/L 4.71  0.28 (L)  1.03      Component      Latest Ref Rn 5/24/2024   Sodium      136 - 145 mEQ/L 138    Potassium, Bld      3.5 - 5.1 mEQ/L 4.4    Chloride      98 - 107 mEQ/L 104    CO2      21 - 31 mEQ/L 24    BUN      7 - 25 mg/dL 40 (H)    Creatinine      0.6 - 1.2 mg/dL 1.1    Glucose      70 - 99 mg/dL 87    Calcium      8.6 - 10.3 mg/dL 9.5    eGFR      >=60.0 mL/min/1.73m*2 56.6 (L)    Anion Gap      3 - 15 mEQ/L 10      Component      Latest Ref Rng 4/13/2023   Hemoglobin A1C      <5.7 % 4.4        Component      Latest Ref Rng 4/14/2023   Triglycerides      30 - 149 mg/dL 44    Cholesterol      <=200 mg/dL 194    HDL      >=55 mg/dL 49 (L)    LDL Calculated      <=100 mg/dL 136 (H)    Non-HDL, Calculated      mg/dL 145    RISK      <=5.0  4.0       Component      Latest Ref Rng 4/13/2023   High Sens Troponin I      <15.0 pg/mL 23.0 (H)       Component      Latest Ref Rng 4/14/2023   Ferritin      11 - 250 ng/mL 75       Component      Latest Ref Rng 4/14/2023   Iron      35 - 150 ug/dL 29 (L)    TIBC      270 - 460 ug/dL 245 (L)    UIBC      180 - 360 ug/dL 216    Iron Saturation      15 - 45 % 12 (L)        Component      Latest Ref Rng 6/27/2022 4/13/2023   BNP      <=100 pg/mL 111 (H)  105 (H)                          ---    ECG (8/2/2024, personally reviewed):   Sinus tachycardia   Left ventricular hypertrophy with repolarization abnormality    HR: 117 bpm     ---        Lower extremity arterial duplex and ENRRIQUE August 10, 2024:  Right ankle ENRRIQUE (DP): 0.74.  Right ankle ENRRIQUE (PT): 0.88.  Left ankle ENRRIQUE (DP): 0.90.  Left ankle ENRRIQUE (PT): 0.62.    Findings concerning for hemodynamically significant stenosis within the  bilateral popliteal arteries and distal left femoral artery. There are also  findings which can be seen with inflow stenosis at the level left common femoral  artery. CTA of the abdomen and pelvis with lower extremity runoff could be  performed for further evaluation if clinically indicated.    CT Chest / Abdomen / Pelvis w/ Contrast (7/25/2024, personally reviewed):     Lung bases: Left-sided pleural effusion, left parenchymal consolidation, and  findings of interstitial lung disease.  More nodular density in the medial  aspect of the left lower lobe measuring 1.6 x 1.9 cm.  Cardiomegaly with a  pericardial effusion.     Liver: The liver is normal in size.  There is no focal mass.  Hepatic veins and  portal veins are patent without focal filling defects to suggest thrombosis..     Gallbladder:  Small dependent gallstones.  No gallbladder wall thickening..     Spleen: Spleen is normal in size without focal mass lesion..     Pancreas: The pancreas is normal without focal mass, parenchymal atrophy, or  pancreatic duct dilation..     Adrenals: The adrenals are normal bilaterally without focal mass..     Kidneys, ureters and bladder:  Symmetric enhancement and excretion..  Left lower  pole cystic lesion measures 3.2 cm  in maximal dimension.  This measures greater  than simple fluid density.  This measured 60 Hounsfield units on the noncontrast  CT from 2020.  It measures 36 Hounsfield units on today's study.  Therefore it  is most in keeping with a hemorrhagic/proteinaceous cyst.  Additional  hypodensities too small to characterize.     Retroperitoneal structures: There is no abdominal aortic aneurysm.  Atherosclerotic calcification.  There is no retroperitoneal adenopathy or  retroperitoneal mass..     Bowel and mesentery: No evidence of a focal inflammatory or obstructive process.  Moderate fecal retention throughout the colon.  There are a few fluid-filled  small bowel loops in the pelvis, nonspecific.     Lymph nodes: No pathologic lymphadenopathy..     Pelvis: The uterus is normal in size.  The ovaries are normal.  There is no  adnexal mass or pelvic free fluid.     Bones: Within normal limits.    No evidence for pulmonary embolism.  Persistent left-sided pleural effusion and airspace disease at the left lung  base.  Underlying interstitial lung disease.     TTE (7/19/2024, personally reviewed):   Mild increased wall thickness with normal left ventricular cavity and preserved systolic function. EF 65%. No wall motion abnormalities.   Normal right ventricular size with preserved systolic function.   IVC normal in size with >50% respiratory variation consistent with normal right sided filling pressures.   Small pericardial effusion. No evidence of hemodynamic compromise with normal respiratory variation and no chamber collapse. Prominent epicardial fat.   Compared with previous study of 5/23/2024, small pericardial effusion persists.      PET/CT Skull-to-Thigh (6/4/2024):   An approximately 1.6 cm pulmonary nodule at the medial left lung base is  significantly FDG avid at max SUV 8.5.  This is nonspecific and can be seen in  the setting of benign pathology however malignancy is a concern.     There is more mild to moderate  nonspecific uptake localizing to lita-fissural  nodules particularly on the left.     There is  moderate nonspecific left hilar and mediastinal dawson uptake.     No additional suspicious FDG avid findings appreciated.     CTA Chest (5/21/2024):   IMPRESSION:  1.  No evidence of acute pulmonary embolism.  2.  Multiple new perifissural left lower lobe nodules, suspicious for a  neoplastic process. PET/CT and or tissue sampling is recommended.  3.  Chronic interstitial lung disease in an NSIP pattern, with overall slightly  progressed appearance since March 2023. Pulmonary consultation is recommended.  4.  Stable cardiomegaly and large pericardial effusion.  5.  Mediastinal and supraclavicular adenopathy, similar to prior study, also  indeterminate, which could be further evaluated at time of PET/CT.     CTA Chest (4/16/2024):   IMPRESSION:  1. No evidence of pulmonary embolism.  2. Stable cardiomegaly and large pericardial effusion. Cardiology consultation  suggested.  3. Emphysema with bilateral lower lobe groundglass opacity and bronchiectasis in  an NSIP pattern, which can be seen with scleroderma. Pulmonary consultation  suggested.  4. Stable 16 mm left lower lobe masslike density which may represent  atelectasis, lung cancer or lymphoma. When clinically appropriate PET/CT  suggested.  5. Stable prominence of the main pulmonary artery which can be seen with  pulmonary hypertension     TTE (5/23/2024, personally reviewed):  Mild increased wall thickness with normal left ventricular cavity and preserved systolic function. EF 65%. No wall motion abnormalities.   Normal right ventricular size with preserved systolic function.   Tiny IVC with >50% respiratory variation consistent with low-normal right sided filling pressures.   Small pericardial effusion, predominantly adjacent to right atrium. No evidence of hemodynamic compromise with normal respiratory variation and no chamber collapse. Prominent epicardial fat.    Compared with previous study of 4/10/24, small pericardial effusion persists.      TTE (4/10/2024, personally reviewed):  1. Normal left ventricular cavity size with mild concentric left ventricular hypertrophy. Normal systolic function. EF: 60%. No regional wall motion abnormalities. Cannot determine diastolic function. Global longitudinal strain not reported due to technical limitations of study.   2. Aortic valve has three cusps. Trace aortic regurgitation. Aortic valve and root sclerosis.  3. Thickened mitral valve leaflets. Trace mitral valve regurgitation. Normal left atrial size.   4. Normal right ventricular structure and function. Trace tricuspid valve regurgitation with estimated RVSP: 51 mmHg (assuming right atrial pressure of 3 mmHg). Normal right atrial size. Pulmonic valve structurally normal. Trace pulmonic valve regurgitation. Pulsed wave Doppler of the RVOT systolic profile shows decreased acceleration times and geometry consistent with mildly elevated PVR.  5. IVC size is normal with > 50% inspiratory collapse consistent with normal right atrial pressure. Small-to-moderate sized, circumferential pericardial effusion. No echocardiographic evidence for cardiac tamponade.   6. Compared to previous study from 3/4/2024, no significant change. Pericardial effusion size is similar, estimated right atrial pressure lower.           CTA Abd / Pelvis (3/13/2024, personally reviewed):   There is dilatation of the of proximal/mid small bowel with relatively abrupt  transition left hemiabdomen, likely obstruction. Questionable lesion at the  transition point. This could be better evaluated with CT or MRI enterography.     Aorta and major branches patent. Superior mesenteric artery patent without  significant stenosis.  Suspect moderate stenosis of the origins of the of renal arteries bilaterally.     Large pericardial effusion, no change.     Patchy opacities lower lungs, similar to prior.      Coronary CTA  (3/11/2024):   1. Two-vessel coronary artery disease as above that is moderate in severity.  CT  FFR is normal..  2. There is mitral annular calcification and aortic sclerosis.  The right atrium  is enlarged.  There is left ventricular hypertrophy.  There is a moderate to  large circumferential pericardial effusion.  3. Normal left ventricular wall motion and systolic function.  4. Coronary calcium score 313, 96th percentile     TTE (3/4/2024, personally reviewed):   Normal-sized left ventricle. Mild left ventricular hypertrophy. Preserved systolic function. LVEF 60%. No regional wall motion abnormalities. Grade II diastolic dysfunction.  Normal global longitudinal strain (-20%).  The aortic valve is not well seen.  Cannot determine the number of cusps.  Sclerotic leaflets.  No aortic stenosis.  Trace aortic insufficiency.  Normal sized aortic root.  The visualized portion of the ascending aorta is normal in size.  The mitral valve leaflets are thickened. Mild mitral annular calcification. Trace mitral regurgitation.   Mildly dilated left atrium.  Normal-sized right ventricle. Normal right ventricular systolic function.  Thickened tricuspid valve. There is mild tricuspid regurgitation with estimated RVSP of 46 mmHg.   Pulmonic valve is not well visualized. Trace pulmonic regurgitation.   Mildly dilated right atrium.  IVC is enlarged with <50% respiratory variation consistent with elevated right atrial filling pressure (at least 15 mmHg).   Moderate, circumferential, circumferential pericardial effusion.  The largest pocket is located inferolaterally.  Elevated right atrial pressure.  No other clear echocardiographic features of increased pericardial pressure.  Correlate clinically.   Compared to previous TTE from 6/21/2023, pericardial effusion now moderate in size.  Right atrial pressure now elevated.         CTA Chest (3/3/2024):   IMPRESSION:  1.  No evidence of acute pulmonary embolism to the level of the  segmental  branches.  2.  Moderate to large pericardial effusion measuring higher than simple fluid  density.  3.  Lower lobe lung field predominant interstitial thickening with some relative  subpleural sparing, consistent with a chronic interstitial lung disease,  unchanged from the prior study.  4.  Continued bilateral axillary, mediastinal, and bilateral hilar  lymphadenopathy, not definitely changed from the prior study, possibly related  to the patient's sarcoid.  5.  Emphysema.      CTA Chest PE (10/4/2023):  IMPRESSION:  1. No evidence for filling defect or vessel cutoff to suggest pulmonary  embolism.  Enlargement of the pulmonary arteries compatible with pulmonary  arterial hypertension.  2. Changes throughout the lungs as described above which can be seen with  systemic sclerosis/scleroderma.  Underlying pneumonia the lung bases cannot be  entirely excluded in the proper clinical setting.  3.  Additional stable findings as described above.        VQ (9/12/2023):  IMPRESSION:  Intermediate to high probability of pulmonary embolic disease.     6MWT (7/25/2023, on Sildenafil 20 mg TID):        TTE (6/21/2023, personally reviewed):  1. Normal left ventricular cavity size and mild concentric left ventricular hypertrophy. Normal systolic function. EF: 60%. No regional wall motion abnormalities. Grade I diastolic dysfunction.   2. Aortic valve cusps not well visualized. Trace aortic regurgitation. Aortic valve and root sclerosis.  3. Thickened mitral valve leaflets. Mild mitral annular calcification. Trace mitral regurgitation. Mildly dilated left atrial size.   4. Normal right ventricular structure and function. Mild tricuspid valve regurgitation with estimated RVSP: 55 mmHg (assuming right atrial pressure of 3 mmHg). Normal right atrial size. Pulmonic valve not well visualized. Trace pulmonic valve regurgitation. Pulsed wave Doppler of the RVOT systolic profile show decreased acceleration times ( msec)  and late systolic notching consistent with elevated PVR.   5. IVC size is normal with > 50% inspiratory collapse consistent with normal right atrial pressure. Small pericardial effusion without echocardiographic evidence of tamponade.  6. Compared to previous study from 4/14/2023, estimated right ventricular systolic pressure is higher.        TTE (4/14/2023, personally reviewed):   The left ventricular cavity size is normal with mild left ventricular hypertrophy and preserved systolic function. Estimated EF 65%. No regional wall motion abnormalities. Grade I diastolic dysfunction.     The aortic valve is tricuspid. Aortic valve sclerosis. Trace aortic regurgitation.      The visualized portions of the aortic root and ascending aorta are normal in size.     The mitral valve is mildly thickened. Mild mitral annular calcification. Trace mitral regurgitation.       The left atrium is severely dilated.      The right ventricle is normal in size with preserved systolic function     The pulmonic valve is not well seen.     The tricuspid valve is normal in appearance. mild tricuspid regurgitation with an estimated RVSP of 34 mmHg.       Right atrium is normal in size.     The IVC is small and collapses > 50% with inspiration consistent with normal right atrial pressures.     Small pericardial effusion, mostly posterior.       Compared to previous echocardiogram from 8/22/2022, there is no significant change        TTE (11/28/2022, personally reviewed):   Normal right- and mildly elevated left-sided filling pressures (RA: 3 mmHg, PCWP: 13 mmHg)  Elevated pulmonary artery pressures (60/22(35 mmHg)) in the setting of PCWP: 13 mmHg.   Normal cardiac output and index (CO: 5.1 L/min, CI: 3.2 L/min/m2)  PVR: 4.3 Wood units. SVR: 1840 dynes/sec/cm5      TTE (8/22/2022, personally reviewed):  Normal-sized LV. Normal LV systolic function. Estimated EF 55- 60%. Wall motion appears grossly normal. Mild concentric left ventricular  hypertrophy. Grade I LV diastolic dysfunction.  Pulmonic valve not well visualized. Grossly normal pulmonic valve structure. Trace pulmonic valve regurgitation. No pulmonic valve stenosis.  Aortic valve not well visualized. Aortic valve not well seen but likely trileaflet. Focal aortic valve calcification present. Sclerotic aortic valve leaflets. Mild aortic valve regurgitation. No aortic valve stenosis.  RV not well visualized. Normal-sized RV. Normal RV systolic function.  Normal-sized RA.  There is a small loculated pericardial effusion anterior to the right atrium. No cardiac tamponade.  Sclerotic mitral valve. Mitral valve calcification of the anterior leaflet present.  Trace mitral valve regurgitation.  No significant mitral valve stenosis.  Normal-sized IVC. IVC demonstrates normal respiratory collapse.  Tricuspid valve structure is grossly normal. Mild to moderate tricuspid valve regurgitation.  Estimated RVSP = 50-55 mmHg.  Mildly dilated LA.  No previous echocardiogram available for comparison.     TTE (4/21/2022, outside study):  This result has an attachment that is not available.     A complete transthoracic echocardiogram (including 2D, color flow   Doppler, spectral Doppler and M-mode imaging) was performed using the   standard protocol. The study quality was adequate.     The left ventricle is normal in size. There is moderately increased   wall thickness consistent with moderate concentric hypertrophy.   Endocardium is incompletely visualized, but no regional wall motion   abnormalities are noted in the visualized segments. Normal left   ventricular ejection fraction. The left ventricular ejection fraction by   visual estimate is 55% to 60%.     The right ventricle is normal in size. There is normal function of the   right ventricle.     The left atrium is normal in size. Left atrial volume is 58 mL.     The right atrial volume measures 52 mL. The right atrial volume index   measures 34 mL/m2.      There is trace mitral regurgitation. There is no mitral stenosis.     The aortic valve is trileaflet. There is mild aortic valve thickening.   There is no aortic stenosis. There is trace aortic regurgitation.     There is mild to moderate tricuspid regurgitation. Pulmonary artery   systolic pressure measures 52 mmHg. The pulmonary artery systolic pressure   is moderately elevated.     The aorta measures 3.2 cm at the sinus of Valsalva. The proximal   segment of the ascending aorta is mildly enlarged. The proximal segment of   the ascending aorta measures 3.4 cm. The proximal segment of the ascending   aorta measures 2.2 cm/m2, indexed for body surface area.     Trivial pericardial effusion present. There is no echocardiographic   evidence of cardiac tamponade.     The inferior vena cava is normal in size (diameter <21 mm) and   decreases >50% in size with inspiration, suggesting a normal right atrial   pressure of 3 mmHg (range 0-5 mm Hg).     Compared to the prior study of 3/13/2020, there are no significant   changes.        PFT (3/23/2022):      PFT (7/14/2021):      CT Chest (5/2/2021, outside study):  CLINICAL INFORMATION: Systemic sclerosis. Interstitial lung disease. Groundglass nodule     PROCEDURE: Unenhanced CT of the chest was performed using thin section reconstructions. Images were obtained on inspiration and expiration.     COMPARISON: June and August 2020     FINDINGS:     LUNGS, PLEURA:     Groundglass opacities with reticulation with subpleural sparing in the lower lobes, without honeycombing or architectural distortion, unchanged.     Right upper lobe groundglass nodule, image 114 of series 3, 8 x 11 mm, previously 6 mm.     Expiratory images are of diagnostic quality and do not demonstrate evidence of clinically significant air trapping.     CARDIOVASCULAR, MEDIASTINUM, THYROID: Coronary calcifications. Trace pericardial effusion.     LYMPH NODES: Subcentimeter mediastinal lymph nodes, unchanged.  Unchanged axillary lymph nodes.     SKELETON, CHEST WALL: No destructive bone lesion     UPPER ABDOMEN: Patulous esophagus. 3 mm right renal stone.       RHC (9/02/2020):  RA   8 mmHg   RV   40/5 mmHg   PA (mean)  44/16 (25) mmHg   PCWP   12 mmHg   CO   4.65 lpm (Thermodilution)   CI   2.65 lpm/m-squared   SVI    33 ml/beat/m-squared (lower limit normal 29)   PVR   2.8 mmHg/l/min (Wood units)   SVR   2064 dyne-sec-cm-5         PFT (3/18/2020):      TTE (3/13/2020, outside study):  · The study quality was good.   · The left ventricle is normal in size. Top normal left ventricular wall   thickness. There are no segmental wall motion abnormalities. The left   ventricular ejection fraction is 55-60% by visual estimate and 3D   echocardiography. Normal left ventricular ejection fraction.   · There is grade I (mild) LV diastolic dysfunction.   · The right ventricle is normal in size. There is normal function of the   right ventricle.   · The right atrium is mildly dilated.   · There is mild mitral valve anterior leaflet thickening. There is mild   mitral valve leaflet calcification. There is flattening of the mitral   leaflets without raphael prolapse. There is trace mitral regurgitation.   · The aortic valve is trileaflet. There is mild thickening of the aortic   valve. There is mild calcification of the aortic valve. There is no aortic   /stenosis. There is trace aortic regurgitation.   · There is mild tricuspid valve leaflet thickening. There is mild to   moderate tricuspid regurgitation. The pulmonary artery systolic pressure   is moderately elevated. Pulmonary artery systolic pressure measures 50   mmHg. There is mid-systolic notching of the RVOT VTI consistent with   elevated pulmonary vascular resistance.   · The inferior vena cava is normal in size (diameter <21 mm) and decreases   >50% in size with inspiration, suggesting a normal right atrial pressure   of 3 mmHg (range 0-5 mm Hg).   · Trivial loculated  pericardial effusion present adjacent to the right   ventricle and adjacent to the right atrium.          Assessment / Plan:     1. Chest Pain:  - Given pattern and character, I believe this is musculoskeletal / serous pain from her autoimmune disease. There may be an element of myopericarditis.  - She reports that pain has not responded to increased in steroids in the past   - Non-ACS chest pain and known non-obstructive CAD as recently as March 2024 CCTA.   -A lidocaine patch was placed just prior to me entering the room.  Hopefully this will help her pain.    2. Pulmonary Hypertension (WHO Group I, NYHA/WHO FC III):   - Occurring in the background of Systemic Scleroderma with ILD. August 2022 TTE showed normal RV size and function, but progressive exertional decline, worsened DLCO and resting tachycardia suggest there may be progression of her pulmonary vascular disease and limitation of cardiac output. This was proven with 11/2022 RHC showing mean PA 35 mmHg (with PCWP 13 mmHg) and PVR 4.3 Wood units. Recent TTEs have shown appropriate RV:PA coupling with normal RV size and systolic function.   - She is hypervolemic on exam  - CXR improved today  -Recommend to resume diuresis, ethacrynic acid 50 mg p.o. daily when tolerating PO, has been nauseous and not eating/drinking much. This is higher than home dose so hopefully will achieve diuresis.  Please continue daily standing weights.  Please start recording I's and O's.    - Continue Sildenafil 20 mg TID   - She should continue uninterrupted Macitentan 10 mg daily -her home medication has been brought in and she is receiving it.    3. HTN:   - Continue Amlodipine.     4. Systemic Scleroderma / Raynaud's Disease:   - Per Rheumatology (Dr. Trevizo).      5. ILD:   - Per Pulmonology and Rheumatology (Dr. Trevizo). She has not tolerated trials of Mycophenolate. She briefly received Tocilizumab.     6. PE:   - She has had elevated probability on V/Q scans, but CTA Chest  has consistently not shown evidence for acute or chronic thromboembolism. Apixaban has been discontinued.     7. CAD:   - Two-vessel non-obstructive disease on March 2024 CCTA. She is continued on Atorvastatin 40 mg daily.     8. Toe Ulcerations  - Per Rheumatology, unlikely to see LE ulcerations with scleroderma. S/P left PTA dSFA/TP and DCB to popliteal arteries 8/14/24. Followed by vascular surgery. Started on Plavix which is currently being held due to hematoma found on CT scan along with hemoglobin drop.    9. Lung Mass  - Pathology from 8/8 biopsy with atypical glandular cells, possibly consistent with adenocarcinoma  - Management per pulmonology    KARYNA Carmona  8/20/2024

## 2024-08-20 NOTE — PROGRESS NOTES
Patient: Arielle GUAJARDO Isidro  Location: [place] [address]  MRN:  [id number]  Today's date:  8/20/2024    Attempted to see patient for therapy. Unable due to patient refused (Pt declined at this time due to just getting meds and still in a lot of pain.  Agrees to participate in therapy at later time today.  Will follow up.).

## 2024-08-20 NOTE — TELEPHONE ENCOUNTER
Called to schedule new appt with Dr. Charles; however, pt did not . Therefore, I left a VM for them to call us back.

## 2024-08-20 NOTE — DISCHARGE SUMMARY
Hospital Medicine    Inpatient Discharge Summary        BRIEF OVERVIEW   Patient: Arielle Felix (1960)    Admitting Provider: Eladio Patel MD  Attending Provider: Maddy Cope MD Attending phys phone: [phone]    PCP: Sara Simmons [phone]    Admission Date: 8/8/2024  Discharge Date: 8/21/2024     DISCHARGE DIAGNOSES      Primary Discharge Diagnosis  Pulmonary edema, acute (CMS/[facility])    Secondary Discharge Diagnoses  Active Hospital Problems    Diagnosis Date Noted    Chest pain 05/22/2024     Priority: High    Wound of lower extremity 08/11/2024     Priority: Medium    Interstitial lung disease (CMS/HCC) 11/28/2022     Priority: Medium    Hematoma 08/16/2024    Abdominal pain 08/15/2024    Anxious appearance 08/13/2024    Pain of both eyes 08/11/2024    Pain 08/10/2024    Other constipation 08/10/2024    Pulmonary edema, acute (CMS/HCC) 07/19/2024    Multiple open wounds of lower extremity 05/22/2024    Pulmonary nodule 04/16/2024    Acute on chronic heart failure with preserved ejection fraction (CMS/HCC) 04/14/2023    Debility 04/14/2023    Palliative care by specialist 04/14/2023    Scleroderma (CMS/[facility]) 04/06/2023    Pulmonary hypertension (CMS/HCC) 08/09/2022    Essential (primary) hypertension 08/09/2022      Resolved Hospital Problems   No resolved problems to display.         SUMMARY OF HOSPITALIZATION      Presenting Problem/History of Present Illness  Ms Felix is a 63 y.o. 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 She had a successful EBUS to 11L with on-site cytology negative for malignancy or granuloma. Interval post-procedure CXR revealed diffuse interstitial infiltrates with alveolar infiltrate throughout the majority of the left lung, and moderate size layering left  "pleural effusion which is concerning for volume overload. Additionally, post-procedure she was found to have intractably bony chest pain which has not been responsive to tylenol. She is currently on 3L NC (2L at baseline) but was saturating well when titrated back to 2L. Remaining vital are within acceptable limits. She reportedly did not take her daily edecrin today and this was ordered by the pulmonary team in the PACU.     Hospital Course      Patient admitted to [facility] from 8/8 to 8/21 for evaluation and management of chest pain after her bronchoscopy.    Regarding the bronchoscopy; Left lower lobe nodule, s/p ION navigational bronchoscopy with biopsy showing a small focus of atypical glands. Pulmonology recommends outpt f/u with pulm regarding biopsy findings.    In regards to the chest pain. Pt has chronic chest pain which is what prompted her work up for lung pathology, however she described this chest pain as \"bony chest pain \".  Exam and history suggest MSK etiology vs serous pain from her autoimmune disease.  No concern for ACS.  Cardiology was consulted, she is known to Dr. Arvizu. She was mildly hypervolemic on exam and was given intermittent doses of IV Edecrin and is now back on her home PO Edecrin. She is 114lbs on day of discharge.    Additionally, she was found to have bilateral lower extremity wounds and bilateral second digit dry gangrene. Podiatry and Vascular were consulted. She underwent LLE angio, balloon angio of distal SFA, TP trunk, DCB angio of popliteal artery on 8/14. Now on Plavix 75mg daily. She has an allergy to aspirin. Continue local wound care.    Patient developed lower abdominal pain post procedurally as well as intermittent nausea/vomiting.  LFTs unremarkable and lipase within normal limits.  CTAP showed an unremarkable gallbladder, pancreas.  UA bland.  CT did reveal a hematoma in the right inguinal region.  Ultrasound of right groin showed probable right groin/pelvic hematoma.  No " evidence of right groin pseudoaneurysm or AV fistula.  Her CBC was monitored closely, no signs of bleeding, her hemoglobin remained stable in the 7-8 range without the need for blood transfusion.  Her nausea/vomiting is thought to be multifactorial in the setting of constipation and medication side effects.  Ensure bowel regimen, though patient mostly refuses this. PRN zofran. Encouraged pt to not take medications on an empty stomach.    Her acute/chronic pain was extremely difficult to control.  She has multiple drug allergies including morphine, oxycodone, NSAIDs.  She is also very reluctant to start any new medications.  We consulted pain management who advised addition of Lyrica, Flexeril, tramadol.  Lyrica was ultimately stopped as per ophthalmology recommendations given concerns of it causing her blurry vision/dry eyes. She did not tolerate tramadol due to ?increased HR. She tolerated flexeril so will continue. Palliative care was also consulted. They recommended trial of fentanyl which pt tolerated but didn't find it helpful. Started PO dilaudid which may be contributing to nausea/vomiting however pt feels that it is helping with her pain. Also started gabapentin 100mg TID which can be uptitrated in the outpt setting. She will f/u with Pain management outpt for scheduled appointment.    Important Issues to Address in Follow-Up    []  Follow up with PCP within 1-2 weeks    []  Follow up with Pain management for scheduled appointment: Dr. Charles at the  Location. Pt booked an appt for 09/23/24 at 1:30p.     []  Follow up with Cardiology Dr. Arvizu within 2-4 weeks    []  Follow up with Pulmonology    Exam on Day of Discharge  Physical Exam  Vitals and nursing note reviewed.   Constitutional:       General: She is not in acute distress.     Comments: Chronically ill-appearing, underweight   HENT:      Head: Normocephalic.   Eyes:      Conjunctiva/sclera: Conjunctivae normal.   Cardiovascular:      Rate and  Rhythm: Normal rate and regular rhythm.   Pulmonary:      Effort: Pulmonary effort is normal. No respiratory distress.      Breath sounds: Normal breath sounds.   Abdominal:      General: Bowel sounds are normal. There is no distension.      Palpations: Abdomen is soft.      Tenderness: There is no abdominal tenderness.   Musculoskeletal: Hand deformities-chronic     Cervical back: Neck supple.      Right lower leg: No edema.      Left lower leg: No edema.   Skin:     General: Skin is warm and dry.   Neurological:      Mental Status: She is alert and oriented to person, place, and time.   Psychiatric:         Behavior: Behavior normal.    Consults During Admission  IP CONSULT TO CARDIOLOGY  IP CONSULT TO PAIN MANAGEMENT  IP CONSULT TO PODIATRY  IP CONSULT TO PAIN/PALLIATIVE CARE  IP CONSULT TO OPHTHALMOLOGY  IP CONSULT TO VASCULAR SURGERY  IP CONSULT TO NUTRITION SERVICES  IP CONSULT TO CASE MANAGEMENT  IP CONSULT TO NUTRITION SERVICES  IP CONSULT TO GASTROENTEROLOGY    DISCHARGE MEDICATIONS               Medication List        START taking these medications      clopidogreL 75 mg tablet  Commonly known as: PLAVIX  Take 1 tablet (75 mg total) by mouth daily.  Dose: 75 mg     cyclobenzaprine 5 mg tablet  Commonly known as: FLEXERIL  Take 1 tablet (5 mg total) by mouth 3 (three) times a day as needed for muscle spasms (/ chest pain).  Dose: 5 mg     gabapentin 100 mg capsule  Commonly known as: NEURONTIN  Take 1 capsule (100 mg total) by mouth 3 (three) times a day.  Dose: 100 mg     HYDROmorphone 2 mg tablet  Commonly known as: DILAUDID  Take 1 tablet (2 mg total) by mouth every 4 (four) hours as needed (severe pain) for up to 5 days.  Dose: 2 mg     ondansetron 4 mg tablet  Commonly known as: ZOFRAN  Take 1 tablet (4 mg total) by mouth every 8 (eight) hours as needed for nausea or vomiting for up to 7 days.  Dose: 4 mg     polyethylene glycol 17 gram packet  Commonly known as: MIRALAX  Take 17 g by mouth 2 (two)  times a day as needed (constipation) for up to 5 days.  Dose: 17 g            CHANGE how you take these medications      ethacrynic acid 25 mg tablet  Commonly known as: EDECRIN  Take 2 tablets (50 mg total) by mouth daily.  Dose: 50 mg  What changed: how much to take     * MEDIHONEY (HONEY) TOP  Apply topically daily.  What changed: Another medication with the same name was added. Make sure you understand how and when to take each.     * honey 100 % paste  Commonly known as: MEDIHONEY  Apply topically daily.  What changed: You were already taking a medication with the same name, and this prescription was added. Make sure you understand how and when to take each.           * This list has 2 medication(s) that are the same as other medications prescribed for you. Read the directions carefully, and ask your doctor or other care provider to review them with you.                CONTINUE taking these medications      ACTEMRA 200 mg/10 mL (20 mg/mL) solution  Infuse 8 mg/kg into a venous catheter every 28 (twentyeight) days.  Dose: 8 mg/kg  Generic drug: tocilizumab     amLODIPine 5 mg tablet  Commonly known as: NORVASC  Take 10 mg by mouth every morning.  Dose: 10 mg     atorvastatin 40 mg tablet  Commonly known as: LIPITOR  Take 1 tablet (40 mg total) by mouth daily.  Dose: 40 mg     benzonatate 100 mg capsule  Commonly known as: TESSALON  Take 100 mg by mouth 3 (three) times a day as needed for cough.  Dose: 100 mg     biotin 1 mg tablet  Take 1,000 mcg by mouth daily.  Dose: 1,000 mcg     cholecalciferol (vitamin D3) 1,000 unit (25 mcg) tablet  Take 1,000 Units by mouth daily.  Dose: 1,000 Units     cyanocobalamin (vitamin B-12) 1,000 mcg capsule  Take 1,000 mcg by mouth daily.  Dose: 1,000 mcg     docusate sodium 100 mg capsule  Commonly known as: COLACE  Take 100 mg by mouth daily as needed for constipation.  Dose: 100 mg     hydrocortisone 1 % cream  Apply to affected area 2 times daily     levothyroxine 100 mcg  tablet  Commonly known as: SYNTHROID  Take 100 mcg by mouth daily. BRAND ONLY  Dose: 100 mcg     lidocaine 4 % adhesive patch,medicated topical patch  Commonly known as: ASPERCREME  Apply 1 patch topically daily as needed (pain). Remove & discard patch within 12 hours or as directed by prescriber.  Dose: 1 patch     loperamide 2 mg capsule  Commonly known as: IMODIUM  Take 2 mg by mouth every 8 (eight) hours as needed for diarrhea.  Dose: 2 mg     meclizine 25 mg tablet  Commonly known as: ANTIVERT  Take 25 mg by mouth daily as needed.  Dose: 25 mg     mupirocin 2 % ointment  Commonly known as: BACTROBAN  Apply 1 application. topically daily. Behind ears  Dose: 1 application.     OPSUMIT 10 mg tablet tablet  Take 1 tablet (10 mg total) by mouth daily.  Dose: 10 mg  Generic drug: macitentan     pantoprazole 20 mg EC tablet  Commonly known as: PROTONIX  Take 20 mg by mouth daily.  Dose: 20 mg     SantyL ointment  Apply 1 Application topically daily.  Dose: 1 Application  Generic drug: collagenase     sildenafiL (pulm.hypertension) 20 mg tablet  Commonly known as: REVATIO  Take 1 tablet (20 mg total) by mouth 3 (three) times a day.  Dose: 20 mg     sodium chloride 0.65 % nasal spray  Commonly known as: OCEAN  Administer 2 sprays into each nostril daily.  Dose: 2 spray               Instructions for after discharge       Discharge diet      Diet Type / Texture: Regular    Fluid restriction dietary / 24h: 2000 mL Fluid    Follow-up with Provider:      Kandace Charles MD   [phone]    [address]  [address]  [place] [address]       Follow-up with department      [facility]   [phone]    [address]  [place] [address]       Follow-up with primary physician (PCP)      Follow-up with provider      Timothy Arvizu DO   [phone]    [address]  [facility] [place] [address]       Post-Discharge Activity: Normal activity as tolerated.       Normal activity as tolerated.               PROCEDURES / LABS / IMAGING      Operative Procedures  As above    Other Procedures  As above    Pertinent Labs  Results from last 7 days   Lab Units 08/20/24  0611 08/19/24  0622 08/18/24  0945 08/17/24  0847   SODIUM mEQ/L 139 140 140 138   POTASSIUM mEQ/L 4.8 4.4 4.2 4.6   CHLORIDE mEQ/L 105 106 109* 106   CO2 mEQ/L 26 27 23 25   BUN mg/dL 30* 25 30* 37*   CREATININE mg/dL 1.2 1.1 1.1 1.1   CALCIUM mg/dL 9.1 9.1 9.0 9.2   ALBUMIN g/dL  --  3.3* 3.4* 3.5   BILIRUBIN TOTAL mg/dL  --  0.3 0.3 0.3   ALK PHOS IU/L  --  49 52 55   ALT IU/L  --  9 11 12   AST IU/L  --  11* 14 14   GLUCOSE mg/dL 82 79 101* 89     Results from last 7 days   Lab Units 08/20/24  0611 08/19/24  0622 08/18/24  1612   WBC K/uL 8.59 7.79 10.61*   HEMOGLOBIN g/dL 7.8* 8.0* 7.9*   HEMATOCRIT % 27.4* 27.3* 27.1*   PLATELETS K/uL 213 184 192   DIFF TYPE  Auto Auto Auto   NRBC % 0.0 0.0 0.0   IMM GRANULOCYTES % 0.7 0.5 0.5   NEUTROPHILS % 70.6 64.5 71.1   LYMPHOCYTES % 15.3 21.6 18.2   MONOCYTES % 9.1 8.3 6.8   EOSINOPHILS % 3.8 4.6 3.0   BASOPHILS % 0.5 0.5 0.4   IMM GRANUCOCYTES ABS K/uL 0.06 0.04 0.05   MONO ABS AUTO K/uL 0.78 0.65 0.72   EOS ABS AUTO K/uL 0.33 0.36 0.32   BASO ABS AUTO K/uL 0.04 0.04 0.04     Results from last 7 days   Lab Units 08/20/24  0611 08/15/24  0901   MAGNESIUM mg/dL 1.9 2.3                           Pertinent Imaging  CT ANGIOGRAPHY ABDOMEN PELVIS WITH AND WITHOUT IV CONTRAST    Result Date: 8/16/2024  IMPRESSION: Grossly stable appearance of ill-defined right inguinal region centered hematoma. No active extravasation identified. No active gastrointestinal hemorrhage identified.    ULTRASOUND GROIN PSEUDOANEURYSM    Result Date: 8/16/2024  IMPRESSION: Probable right groin/pelvic hematoma.  No evidence of right groin pseudoaneurysm or arteriovenous fistula.    CT ABDOMEN PELVIS WITHOUT IV CONTRAST    Result Date: 8/15/2024  IMPRESSION: Hematoma in the right inguinal region  represents a change when compared to prior study from 8/9/2024.  Hyperdense material within the bladder is of uncertain etiology. Small left pleural effusion with interstitial opacities in the lung bases.     IR TECHNICAL ASSISTANCE    Result Date: 8/15/2024  IMPRESSION:  IR technical assistance was provided for fluoroscopy in the HYBRID OR.  Fluoro time is 9.9 minutes.  Dose is 25 mGy.     Ultrasound carotid bilateral    Result Date: 8/11/2024  IMPRESSION: 1. No evidence for hemodynamically significant stenosis within the bilateral carotid arteries. 2. Bilateral antegrade flow within the vertebral arteries.    CT ORBITS AND SELLA WITHOUT IV CONTRAST    Result Date: 8/11/2024  IMPRESSION: No evidence of acute process involving the orbits. Preliminary report was provided by overnight radiologist, Dr. Shelley Carroll, at 4:48 AM on 8/11/2024.    Ultrasound PVR lower extremity    Result Date: 8/10/2024  IMPRESSION: Ankle-Brachial Indices as above.    Ultrasound arterial leg bilateral    Result Date: 8/10/2024  IMPRESSION: Findings concerning for hemodynamically significant stenosis within the bilateral popliteal arteries and distal left femoral artery. There are also findings which can be seen with inflow stenosis at the level left common femoral artery. CTA of the abdomen and pelvis with lower extremity runoff could be performed for further evaluation if clinically indicated.    X-RAY CHEST 1 VIEW    Result Date: 8/10/2024  IMPRESSION: Ongoing interstitial thickening suggestive of pulmonary edema, similar to prior.    CT ABDOMEN PELVIS WITHOUT IV CONTRAST    Result Date: 8/9/2024  IMPRESSION: Bibasilar airspace opacity/chronic areas of fissural thickening are seen in the lower lobes.  There is persistent left pleural effusion appreciated. Cardiomegaly again noted with small moderate pericardial effusion.. Moderate colonic stool burden especially through to the transverse colon.     X-RAY CHEST 1 VIEW    Result Date:  8/9/2024  IMPRESSION: Stable.     X-RAY CHEST 1 VIEW    Result Date: 8/8/2024  IMPRESSION: 1.  Stable marked cardiomegaly. 2.  Airspace opacities similar to prior.     FL FLUOROSCOPY TECHNICAL ASSISTANCE    Result Date: 8/8/2024  IMPRESSION: Technical assistance was provided for fluoroscopy, reference operative note.  Fluoro time is 3.4 minutes. Dose is 108.40 mGy.    X-RAY CHEST 1 VIEW    Result Date: 8/8/2024  IMPRESSION: 1. No pneumothorax following left lung biopsy. 2. Diffuse interstitial infiltrates with alveolar infiltrate throughout the majority of the left lung, and moderate size layering left pleural effusion. COMMENT:  Portable chest x-ray is compared with 8/5/2024. There is cardiomegaly with diffuse bilateral interstitial infiltrates. Alveolar infiltrate is present throughout the majority of the left lung, with layering moderate-sized pleural effusion. The right lung is partially obscured due to patient rotation. There is no pneumothorax.    X-RAY CHEST 1 VIEW    Result Date: 8/5/2024  IMPRESSION: Cardiomegaly with continued left basilar airspace opacity and interstitial opacities.    CT ANGIOGRAPHY CHEST PULMONARY EMBOLISM WITH IV CONTRAST    Addendum Date: 7/25/2024    IMPRESSION ADDENDUM: No evidence for pulmonary embolism. Persistent left-sided pleural effusion and airspace disease at the left lung base.  There is a heterogeneous nodule in the medial aspect of the left lower lobe measuring 1.6 x 1.9 cm.  This area demonstrated increased PET uptake on previous study. Underlying interstitial lung disease.     Result Date: 7/25/2024  IMPRESSION: No evidence for pulmonary embolism. Persistent left-sided pleural effusion and airspace disease at the left lung base. Underlying interstitial lung disease.     CT ABDOMEN PELVIS WITH IV CONTRAST    Result Date: 7/25/2024  IMPRESSION: No evidence for acute inflammatory obstructive process in the abdomen and pelvis.  Specifically no evidence for diverticulitis.   There is moderate fecal retention within the colon. Left lower pole renal lesion which likely reflects a hemorrhagic/proteinaceous cyst.  Please see discussion below. COMMENT: Technique: CT examination of the abdomen and pelvis was performed utilizing contiguous 2.5 mm transaxial sections. Coronal and sagittal reformations are obtained. Following medication was administered: 100mL of iopamidoL (ISOVUE-370) 370 mg iodine /mL (76 %) injection 100 mL CT DOSE:  One or more dose reduction techniques (e.g. automated exposure control, adjustment of the mA and/or kV according to patient size, use of iterative reconstruction technique) utilized for this examination Comparison studies: CT from 11/27/2020.  Recent PET scan from 6/14/2024. Lung bases: Left-sided pleural effusion, left parenchymal consolidation, and findings of interstitial lung disease.  More nodular density in the medial aspect of the left lower lobe measuring 1.6 x 1.9 cm.  Cardiomegaly with a pericardial effusion. Liver: The liver is normal in size.  There is no focal mass.  Hepatic veins and portal veins are patent without focal filling defects to suggest thrombosis.. Gallbladder:  Small dependent gallstones.  No gallbladder wall thickening.. Spleen: Spleen is normal in size without focal mass lesion.. Pancreas: The pancreas is normal without focal mass, parenchymal atrophy, or pancreatic duct dilation.. Adrenals: The adrenals are normal bilaterally without focal mass.. Kidneys, ureters and bladder:  Symmetric enhancement and excretion..  Left lower pole cystic lesion measures 3.2 cm in maximal dimension.  This measures greater than simple fluid density.  This measured 60 Hounsfield units on the noncontrast CT from 2020.  It measures 36 Hounsfield units on today's study.  Therefore it is most in keeping with a hemorrhagic/proteinaceous cyst.  Additional hypodensities too small to characterize. Retroperitoneal structures: There is no abdominal aortic  aneurysm. Atherosclerotic calcification.  There is no retroperitoneal adenopathy or retroperitoneal mass.. Bowel and mesentery: No evidence of a focal inflammatory or obstructive process. Moderate fecal retention throughout the colon.  There are a few fluid-filled small bowel loops in the pelvis, nonspecific. Lymph nodes: No pathologic lymphadenopathy.. Pelvis: The uterus is normal in size.  The ovaries are normal.  There is no adnexal mass or pelvic free fluid. Bones: Within normal limits.    X-RAY CHEST 1 VIEW    Result Date: 7/25/2024  IMPRESSION: Decreased left pleural effusion. Unchanged pulmonary edema in setting of cardiomegaly.      OUTPATIENT  FOLLOW-UP / REFERRALS / PENDING TESTS        Outpatient Follow-Up Appointments            In 1 month Kandace Charles MD [facility] in [place]    In 1 month Timothy Arvizu DO [facility] at [facility]            Referrals  No orders of the defined types were placed in this encounter.      Test Results Pending at Discharge  Pending Inpatient Labs       Order Current Status    AFB Culture Bronchioalveolar Lavage, Left Lower Lobe Preliminary result    AFB culture Bronchioalveolar Lavage, Left Lower Lobe Preliminary result    Fungal Culture Bronchioalveolar Lavage, Left Lower Lobe Preliminary result    Fungal culture / smear Bronchioalveolar Lavage, Left Lower Lobe Preliminary result              DISCHARGE DISPOSITION AND DESTINATION      Disposition: Home Health Care - Other  Destination: Home                            Code Status At Discharge: Full Code    Physician Order for Life-Sustaining Treatment Document Status        No documents found

## 2024-08-20 NOTE — PROGRESS NOTES
Palliative Care Progress Note    This is Hospital Day: 13    Conversation/Goals of Care: Life prolongation with the goal of improved pain management and to return home.       Other constipation  Assessment & Plan  -LBM : 8/20  -Improved constipation likely multifactorial r/t current hospitalization, heart failure on diuretic therapy, debility related to multiple medical comorbid conditions  -CT abdomen 8/9 with moderate colonic stool burden     Patient having daily bowel movement without utilizing the scheduled Miralax and Senna  Plan:  - Recommend 1 packet Miralax BID as needed as she has been declining the scheduled dose  - Recommend Senna 2 tablets nightly as needed as she has been declining the schedule dose        Pain  Assessment & Plan  - Chronic pain attributed to: chronic chest pain, scleroderma c/b pulmonary HTN/ILD, Raynaud's Disease  - New right lower abdominal pain worse after s/p LLE angio on 8/14.     - Patient with numerous allergies to various pain medications (opioids/NSAIDS). She reports no relief with Fentanyl.  She has used dilaudid in the past with some relief.   - PDMP queried: no recent scripts  - Home regimen: lidocaine patch  - Medications available inpatient: Acetaminophen 975 mg p.o. every 6 hours, lidocaine patches, cyclobenzaprine 5 mg p.o. 3 times daily as needed, heating pad  - Tramadol d/c'd 2/2 dizziness  - lyrica d/c'd 2/2 blurred vision  -Agree w/discontinuation of Fentanyl 2/2 ineffectiveness  -Patient started on Gabapentin 100 mg TID today  -Recommend Dilaudid 2 mg PO every 4 hours as needed if Gabapentin is ineffective.       Plan:  - Would establish care with a pain management provider        Palliative care by specialist  Assessment & Plan  63 y.o. female with a PMH significant for severe scleroderma complicated by pulmonary hypertension, ILD.  She presented to [facility] on 8/8 for planned robotic navigational and EBUS bronchoscopies to evaluate growing LLL nodule c/b chest pain  and volume overload.     - Code status: Full Code  - Prognosis:  High risk for acute deterioration and decline  - Capacity for Medical Decision Making: intact  - Advance Directives: No  - Goals of care: Goals are Life-prolonging/disease directed.  Refer to ACP note dated 8/10.  - Surrogate Decision Maker: Patient identifies her 2 adult children, Tiffanie and Wagner, as joint surrogate decision makers        Debility  Assessment & Plan  - Debility likely multifactorial in the setting of multiple chronic medical conditions including scleroderma, CHF, PVD s/p LLE angio c/b retroperitoneal hematoma.   - Living situation prior to hospitalization: Lives at home with 24/7 aides  - Baseline functional status is: Ambulatory short distances  - Current Palliative Performance Status: 40%  - Baseline Palliative Performance Status:  50-60%    - Frailty   - Patient remains high risk for further deterioration and functional decline.    Plan  - Patient open to both palliative bridge and home based palliative NP visits            Interval History (Subjective)    Source: chart review, nurse, patient and the primary medical team    Patient with ongoing left chest, right lower abd, left foot and ankle pain that is unrelieved by the current pain regimen.  No vomiting today, one episode of emesis overnight.     Current Medications:    acetaminophen, 975 mg, oral, q6h SPENCER    amLODIPine, 10 mg, oral, q AM    artificial tears, 1 drop, Both Eyes, QID    atorvastatin, 40 mg, oral, Daily (6p)    benzocaine-menthol, 1 lozenge, Mouth/Throat, q2h PRN    benzonatate, 100 mg, oral, 3x daily PRN    carboxymethylcellulose, 1 drop, Right Eye, 3x daily PRN    clopidogreL, 75 mg, oral, Daily    cyanocobalamin, 1,000 mcg, oral, Daily    cyclobenzaprine, 5 mg, oral, 3x daily PRN    glucose, 16-32 g of dextrose, oral, PRN **OR** dextrose, 15-30 g of dextrose, oral, PRN **OR** glucagon, 1 mg, intramuscular, PRN **OR** dextrose 50 % in water (D50), 25 mL,  intravenous, PRN    diphenhydrAMINE, 50 mg, oral, q8h PRN    docusate sodium, 100 mg, oral, Daily PRN    ethacrynic acid, 50 mg, oral, Daily    gabapentin, 100 mg, oral, TID    honey, , Topical, Daily PRN    HYDROmorphone, 2 mg, oral, q4h PRN    levothyroxine, 100 mcg, oral, Daily (6:30a)    lidocaine, 1 patch, Topical, Daily    lidocaine, 1 patch, Topical, Daily    lidocaine, 1 patch, Topical, Daily    macitentan, 10 mg, oral, Daily    magnesium sulfate, 1 g, intravenous, Once    melatonin, 3 mg, oral, Nightly PRN    ondansetron ODT, 4 mg, oral, q6h PRN **OR** ondansetron, 4 mg, intravenous, q6h PRN    pantoprazole, 20 mg, oral, Daily    artificial tears, 1 drop, Both Eyes, q4h while awake    phenol, 1 spray, Mouth/Throat, q2h PRN    polyethylene glycol, 17 g, oral, BID    senna, 2 tablet, oral, Nightly    sildenafiL (pulm.hypertension), 20 mg, oral, TID    sodium chloride, 2 spray, Each Nostril, Daily    white petrolatum, , Topical, q4h PRN    white petrolatum, , Topical, BID    white petrolatum-mineral oil, , Both Eyes, Nightly    ethacrynic acid    Objective    Physical Exam:  General:   No Acute Distress  Eyes:  Sclera Anicteric  ENMT:  Mucus Membranes Moist  CV:  Radial Pulses 3  bilateral  Lungs:  No evidence of increased WOB  Abdomen:  Bowel Sounds present  Psych:  Appropriate and Cooperative  Neuro:  Awake, Alert and Oriented  person, place, time/date and situation  Skin:  No Rash    Vitals:  Vitals:    08/20/24 1115   BP: (!) 124/59   Pulse: 75   Resp: 18   Temp: 36.7 °C (98 °F)   SpO2: 100%       Laboratory Studies:    CBC Results         08/20/24 08/19/24 08/18/24     0611 0622 1612    WBC 8.59 7.79 10.61    RBC 3.24 3.27 3.26    HGB 7.8 8.0 7.9    HCT 27.4 27.3 27.1    MCV 84.6 83.5 83.1    MCH 24.1 24.5 24.2    MCHC 28.5 29.3 29.2     184 192          CMP Results         08/20/24 08/19/24 08/18/24     0611 0622 0945     140 140    K 4.8 4.4 4.2    Cl 105 106 109    CO2 26 27 23    Glucose  82 79 101    BUN 30 25 30    Creatinine 1.2 1.1 1.1    Calcium 9.1 9.1 9.0    Anion Gap 8 7 8    AST -- 11 14    ALT -- 9 11    Albumin -- 3.3 3.4    EGFR 51.0 56.6 56.6         Troponin I Results         08/09/24 08/09/24 07/25/24     1232 1048 1732    HS Troponin I 93.8 92.5 66.8       Labs reviewed-significant for anemia    Imaging and Other Studies:     Radiology Imaging    XR CHEST 1 VW    Narrative  CLINICAL HISTORY: volume   .    COMMENT: AP chest radiograph was obtained. Comparison is made with recent  studies.    There is ongoing diffuse vascular/interstitial prominence with cardiomegaly.  Pleural effusions are not excluded without gross change from prior. There is no  definite pneumothorax. Osseous degenerative changes are noted.    Impression  IMPRESSION: Ongoing interstitial thickening suggestive of pulmonary edema,  similar to prior.                                                                      Cardiac Imaging    TRANSTHORACIC ECHO (TTE) LIMITED 07/19/2024    Interpretation Summary  Rhythm is sinus tachycardia.    Mild increased wall thickness with normal left ventricular cavity and preserved systolic function. EF 65%. No wall motion abnormalities.  Normal right ventricular size with preserved systolic function.  IVC normal in size with >50% respiratory variation consistent with normal right sided filling pressures.  Small pericardial effusion. No evidence of hemodynamic compromise with normal respiratory variation and no chamber collapse. Prominent epicardial fat.  Compared with previous study of 5/23/2024, small pericardial effusion persists.       No new imagining since 8/16      Lab Results   Component Value Date    QTCCALCULAT 473 08/13/2024       KARYNA Pop  Palliative Care Nurse Practitioner  [facility]  [phone] [facility] office  [phone] [facility] Fax  [address]  [place] [phone]  Pager [phone]

## 2024-08-20 NOTE — PROGRESS NOTES
Hospital Medicine     Daily Progress Note       SUBJECTIVE   Interval History: She is still reporting 10 out of 10 pain.  She did not get any relief from the fentanyl.  We discussed discontinuing this medication and starting gabapentin which she was in agreement with.  She was initially planned for discharge today however she is asking to be discharged tomorrow morning to monitor for the effects of gabapentin and Dilaudid.     OBJECTIVE      Vital signs in last 24 hours:  Temp:  [36.3 °C (97.4 °F)-36.7 °C (98 °F)] 36.7 °C (98 °F)  Heart Rate:  [71-99] 94  Resp:  [16-18] 18  BP: (107-129)/(56-62) 124/59    Intake/Output Summary (Last 24 hours) at 8/20/2024 1441  Last data filed at 8/20/2024 1245  Gross per 24 hour   Intake 898 ml   Output 700 ml   Net 198 ml       PHYSICAL EXAMINATION      Physical Exam  Vitals and nursing note reviewed.   Constitutional:       General: She is not in acute distress.     Comments: Chronically ill-appearing, underweight   HENT:      Head: Normocephalic.   Eyes:      Conjunctiva/sclera: Conjunctivae normal.   Cardiovascular:      Rate and Rhythm: Normal rate and regular rhythm.   Pulmonary:      Effort: Pulmonary effort is normal. No respiratory distress.      Breath sounds: Normal breath sounds.   Abdominal:      General: Bowel sounds are normal. There is no distension.      Palpations: Abdomen is soft.      Tenderness: There is no abdominal tenderness.   Musculoskeletal: Hand deformities-chronic     Cervical back: Neck supple.      Right lower leg: No edema.      Left lower leg: No edema.   Skin:     General: Skin is warm and dry.   Neurological:      Mental Status: She is alert and oriented to person, place, and time.   Psychiatric:         Behavior: Behavior normal.        LINES, CATHETERS, DRAINS, AIRWAYS, AND WOUNDS   Lines, Drains, and Airways:  Wounds (agree with documentation and present on admission):  Peripheral IV (Adult) 08/18/24 Left;Posterior Wrist (Active)   Number  "of days: 1       Wound Anterior;Right Toe (2nd) (Active)   Number of days: 11       Wound Anterior;Left Toe (Great) (Active)   Number of days: 11       Wound Anterior;Left Toe (2nd) (Active)   Number of days: 11       Surgical Incision Groin Right (Active)   Number of days: 5         Comments:    LABS / IMAGING / TELE      Labs  Results from last 7 days   Lab Units 08/20/24  0611   SODIUM mEQ/L 139   POTASSIUM mEQ/L 4.8   CHLORIDE mEQ/L 105   CO2 mEQ/L 26   BUN mg/dL 30*   CREATININE mg/dL 1.2   GLUCOSE mg/dL 82   CALCIUM mg/dL 9.1     Results from last 7 days   Lab Units 08/20/24  0611   WBC K/uL 8.59   HEMOGLOBIN g/dL 7.8*   HEMATOCRIT % 27.4*   PLATELETS K/uL 213     Results from last 7 days   Lab Units 08/20/24  0611   MAGNESIUM mg/dL 1.9           Imaging  No new images today      ECG/Telemetry  Reviewed    ASSESSMENT AND PLAN      Chest pain  Assessment & Plan  Chest pain has been ongoing for months, chronic at this point, describes as \"bony pain\"  Exam and history suggest MSK  Pain control is difficult as patient has various allergies, including morphine, oxycodone, NSAIDs  She has a lidocaine patch, she does not think it is helping much  Consulted pain management; Advised addition of Lyrica, Flexeril, tramadol  Currently, ophthalmology recommended cessation of Lyrica given concerns of it causing her blurry vision/dry eyes  Patient is very apprehensive to try any new medications, she could not tolerate tramadol  Pall on board, fentanyl not helping. Will try PO dilaudid and add gabapentin - tolerating both so far  We have secured an outpatient pain management appointment with Dr. Charles at the  Location. Pt booked an appt for 09/23/24 at 1:30p.    Wound of lower extremity  Assessment & Plan  bilateral lower extremity wounds and bilateral second digit dry gangrene  Podiatry consult appreciated  Advised PVR, arterial US; Ultrasound ENRRIQUE with various deficiencies, but seems worse in the left PT at 0.62.  " Arterial ultrasound with hemodynamically significant stenosis in the bilateral popliteal arteries and distal fem  Vascular surgery consult appreciated  s/p LLE angio, balloon angio of distal SFA, TP trunk, DCB angio of popliteal artery on 8/14  Now on Plavix 75mg daily. She has an allergy to aspirin.  Local wound care as per pods    Interstitial lung disease (CMS/HCC)  Assessment & Plan  DPLD  Does not appear to be in exacerbation for this  Has not tolerated trials of Mycophenolate. On Tocilizumab.   On 2L O2-NC chronically    Hematoma  Assessment & Plan  Imaging reviewed by Vascular Dr. Thomas; Probable small hematoma at the right groin near the access site extending into the preperitoneal area without evidence of contrast extravasation. There is no pseudoaneurysm on US.  --Hgb has been stable, currently 8  --Plavix restarted 8/17    Abdominal pain  Assessment & Plan  Pt reporting lower quadrant abdominal pain day after angio.  No evidence or bruising at fem access site or palpable masses, but CT demonstrated R RP hematoma.  Repeat CTA 8/16 showed stable hematoma w/o signs of active bleed, however did show mass effect on bladder as well as significant constipation.  Work up including LFTs, lipase unremarkable.  -Bowel reg ordered  -pain management as below     Pain of both eyes  Assessment & Plan  Late 8/10 noted right eye pain,, swelling, blurry vision  Seen by ophthalmology  No concern for preseptal or orbital cellulitis  CT orbits: No evidence of acute process involving the orbits  Carotid US negative for HD significant stenosis  Advised various drops, ointments for her eyes--> per MAR has been declining eye drops as recommended by ortho  Advised stopping Lyrica  Both blurred vision and eye pain have resolved    Pulmonary nodule  Assessment & Plan  Left lower lobe nodule, s/p ION navigational bronchoscopy with biopsy showing a small focus of atypical glands.     -Pulm input appreciated  -outpatient follow up  with pulmonary regarding biopsy findings    Acute on chronic heart failure with preserved ejection fraction (CMS/[facility])  Assessment & Plan  EF 65%  Known to Cardiology Dr. Arvizu, in-house consult placed  S/p IV ethacrynic acid, now back on PO  Continue pHTN meds; opsumit, revatio  CHF order set, daily weights, salt/fluid restriction, strict Is&Os      Scleroderma (CMS/[facility])  Assessment & Plan  Again, chronic  Tocilizumab every 28 days    Pulmonary hypertension (CMS/[facility])  Assessment & Plan  Chronic, extensive history    Continue home med macitentan  On sildenafil  Ethacrynic acid 50mg resumed 8/17    Essential (primary) hypertension  Assessment & Plan  Pressure stable here, continue home medications         VTE Assessment: Padua    VTE Prophylaxis:  Current anticoagulants:    None      Code Status: Full Code      Estimated Discharge Date: 8/21/2024     Disposition Planning: pending clinical course, PTOT KARYNA Becerra  8/20/2024

## 2024-08-20 NOTE — PLAN OF CARE
Plan of Care Review  Plan of Care Reviewed With: patient  Progress: no change  Outcome Evaluation: Patient assessment performed, VSS, Telemetry monitor sinus rhythm. Patient reported pain, 10 on numerical scale of 0-10. PRN fentynal administered, during pain reassessment, patienr reported pain management ineffective. Reported intermittent nausea. MD aware. Safety and fall precautions utilized; call bell in reach, bed alarm on.

## 2024-08-20 NOTE — PROGRESS NOTES
Patient: Arielle Hammondstower  Location: [place] [address]  MRN:  [id number]  Today's date:  8/20/2024    Attempted to see patient for therapy. Unable due to patient refused (Pt refused OT due to not feeling well and c/o incr pain.  Pt refused despite encouragement.).

## 2024-08-20 NOTE — PLAN OF CARE
Problem: Adult Inpatient Plan of Care  Goal: Plan of Care Review  Outcome: Progressing  Flowsheets (Taken 8/20/2024 0249)  Progress: improving  Outcome Evaluation:   prn IV fentanyl   IV zofran given for episode of emesis   2L O2  Plan of Care Reviewed With: patient   Plan of Care Review  Plan of Care Reviewed With: patient  Progress: improving  Outcome Evaluation: prn IV fentanyl; IV zofran given for episode of emesis; 2L O2

## 2024-08-20 NOTE — NURSING NOTE
Patient complained of pain, generalized and nausea overnight. PRN zofran and IV fentanyl administered with some effect. One episode of emesis that patient flushed before able to witness. Stated light brown in color. Continues on 2 L O2, BOWSER with ambulation.

## 2024-08-21 PROCEDURE — 99233 SBSQ HOSP IP/OBS HIGH 50: CPT | Performed by: STUDENT IN AN ORGANIZED HEALTH CARE EDUCATION/TRAINING PROGRAM

## 2024-08-21 PROCEDURE — 63700000 HC SELF-ADMINISTRABLE DRUG: Performed by: INTERNAL MEDICINE

## 2024-08-21 PROCEDURE — 21400000 HC ROOM AND CARE CCU/INTERMEDIATE

## 2024-08-21 PROCEDURE — 63700000 HC SELF-ADMINISTRABLE DRUG: Performed by: NURSE PRACTITIONER

## 2024-08-21 PROCEDURE — 25000000 HC PHARMACY GENERAL: Performed by: NURSE PRACTITIONER

## 2024-08-21 PROCEDURE — 63600000 HC DRUGS/DETAIL CODE: Mod: JZ | Performed by: NURSE PRACTITIONER

## 2024-08-21 PROCEDURE — 99233 SBSQ HOSP IP/OBS HIGH 50: CPT | Performed by: INTERNAL MEDICINE

## 2024-08-21 RX ORDER — CLOPIDOGREL BISULFATE 75 MG/1
75 TABLET ORAL DAILY
Qty: 30 TABLET | Refills: 0 | Status: SHIPPED | OUTPATIENT
Start: 2024-08-21 | End: 2024-10-01 | Stop reason: SDUPTHER

## 2024-08-21 RX ORDER — GABAPENTIN 100 MG/1
100 CAPSULE ORAL 3 TIMES DAILY
Qty: 90 CAPSULE | Refills: 0 | Status: SHIPPED | OUTPATIENT
Start: 2024-08-21 | End: 2024-09-03 | Stop reason: HOSPADM

## 2024-08-21 RX ORDER — POLYETHYLENE GLYCOL 3350 17 G/17G
17 POWDER, FOR SOLUTION ORAL 2 TIMES DAILY PRN
Qty: 10 EACH | Refills: 0 | Status: SHIPPED | OUTPATIENT
Start: 2024-08-21 | End: 2024-11-19

## 2024-08-21 RX ORDER — ONDANSETRON 4 MG/1
4 TABLET, FILM COATED ORAL EVERY 8 HOURS PRN
Qty: 21 TABLET | Refills: 0 | Status: ON HOLD | OUTPATIENT
Start: 2024-08-21 | End: 2024-11-30

## 2024-08-21 RX ORDER — METOCLOPRAMIDE HYDROCHLORIDE 5 MG/ML
5 INJECTION INTRAMUSCULAR; INTRAVENOUS ONCE
Status: COMPLETED | OUTPATIENT
Start: 2024-08-21 | End: 2024-08-21

## 2024-08-21 RX ORDER — HYDROMORPHONE HYDROCHLORIDE 2 MG/1
2 TABLET ORAL EVERY 4 HOURS PRN
Qty: 30 TABLET | Refills: 0 | Status: SHIPPED | OUTPATIENT
Start: 2024-08-21 | End: 2024-08-26

## 2024-08-21 RX ORDER — MECLIZINE HCL 12.5 MG 12.5 MG/1
12.5 TABLET ORAL ONCE
Status: COMPLETED | OUTPATIENT
Start: 2024-08-21 | End: 2024-08-21

## 2024-08-21 RX ORDER — CYCLOBENZAPRINE HCL 5 MG
5 TABLET ORAL 3 TIMES DAILY PRN
Qty: 90 TABLET | Refills: 0 | Status: SHIPPED | OUTPATIENT
Start: 2024-08-21 | End: 2024-11-30 | Stop reason: HOSPADM

## 2024-08-21 RX ORDER — MECLIZINE HCL 25MG 25 MG/1
25 TABLET, CHEWABLE ORAL ONCE
Status: COMPLETED | OUTPATIENT
Start: 2024-08-21 | End: 2024-08-21

## 2024-08-21 RX ORDER — FAMOTIDINE 10 MG/ML
20 INJECTION INTRAVENOUS ONCE
Status: COMPLETED | OUTPATIENT
Start: 2024-08-21 | End: 2024-08-21

## 2024-08-21 RX ADMIN — DEXTRAN 70, GLYCERIN, HYPROMELLOSE 1 DROP: 1; 2; 3 SOLUTION/ DROPS OPHTHALMIC at 08:10

## 2024-08-21 RX ADMIN — DEXTRAN 70, GLYCERIN, HYPROMELLOSE 1 DROP: 1; 2; 3 SOLUTION/ DROPS OPHTHALMIC at 21:09

## 2024-08-21 RX ADMIN — METOCLOPRAMIDE 5 MG: 5 INJECTION, SOLUTION INTRAMUSCULAR; INTRAVENOUS at 14:49

## 2024-08-21 RX ADMIN — GABAPENTIN 100 MG: 100 CAPSULE ORAL at 21:08

## 2024-08-21 RX ADMIN — WHITE PETROLATUM: 1.75 OINTMENT TOPICAL at 21:08

## 2024-08-21 RX ADMIN — ETHACRYNIC ACID 50 MG: 25 TABLET ORAL at 08:08

## 2024-08-21 RX ADMIN — LIDOCAINE 4% 1 PATCH: 40 PATCH TOPICAL at 08:09

## 2024-08-21 RX ADMIN — GABAPENTIN 100 MG: 100 CAPSULE ORAL at 08:08

## 2024-08-21 RX ADMIN — Medication 3 MG: at 21:19

## 2024-08-21 RX ADMIN — MINERAL OIL, WHITE PETROLATUM: .03; .94 OINTMENT OPHTHALMIC at 21:08

## 2024-08-21 RX ADMIN — SALINE NASAL SPRAY 2 SPRAY: 1.5 SOLUTION NASAL at 08:10

## 2024-08-21 RX ADMIN — CYANOCOBALAMIN TAB 1000 MCG 1000 MCG: 1000 TAB at 08:07

## 2024-08-21 RX ADMIN — AMLODIPINE BESYLATE 10 MG: 10 TABLET ORAL at 08:07

## 2024-08-21 RX ADMIN — ACETAMINOPHEN 975 MG: 325 TABLET ORAL at 16:54

## 2024-08-21 RX ADMIN — MECLIZINE 12.5 MG: 12.5 TABLET ORAL at 01:17

## 2024-08-21 RX ADMIN — LEVOTHYROXINE SODIUM 100 MCG: 0.1 TABLET ORAL at 05:35

## 2024-08-21 RX ADMIN — SILDENAFIL 20 MG: 20 TABLET, FILM COATED ORAL at 08:07

## 2024-08-21 RX ADMIN — FAMOTIDINE 20 MG: 10 INJECTION, SOLUTION INTRAVENOUS at 14:49

## 2024-08-21 RX ADMIN — WHITE PETROLATUM: 1.75 OINTMENT TOPICAL at 08:10

## 2024-08-21 RX ADMIN — GLYCERIN 1 DROP: .002; .002; .01 SOLUTION/ DROPS OPHTHALMIC at 08:10

## 2024-08-21 RX ADMIN — BENZOCAINE 6 MG-MENTHOL 10 MG LOZENGES 1 LOZENGE: at 08:24

## 2024-08-21 RX ADMIN — GABAPENTIN 100 MG: 100 CAPSULE ORAL at 14:49

## 2024-08-21 RX ADMIN — SILDENAFIL 20 MG: 20 TABLET, FILM COATED ORAL at 21:08

## 2024-08-21 RX ADMIN — ACETAMINOPHEN 975 MG: 325 TABLET ORAL at 11:40

## 2024-08-21 RX ADMIN — ONDANSETRON 4 MG: 2 INJECTION INTRAMUSCULAR; INTRAVENOUS at 00:33

## 2024-08-21 RX ADMIN — PANTOPRAZOLE SODIUM 20 MG: 20 TABLET, DELAYED RELEASE ORAL at 08:07

## 2024-08-21 RX ADMIN — ONDANSETRON 4 MG: 2 INJECTION INTRAMUSCULAR; INTRAVENOUS at 06:49

## 2024-08-21 RX ADMIN — LIDOCAINE 4% 1 PATCH: 40 PATCH TOPICAL at 08:08

## 2024-08-21 RX ADMIN — GLYCERIN 1 DROP: .002; .002; .01 SOLUTION/ DROPS OPHTHALMIC at 16:55

## 2024-08-21 RX ADMIN — MECLIZINE HYDROCHLORIDE 25 MG: 25 TABLET, CHEWABLE ORAL at 11:39

## 2024-08-21 RX ADMIN — GLYCERIN 1 DROP: .002; .002; .01 SOLUTION/ DROPS OPHTHALMIC at 21:08

## 2024-08-21 RX ADMIN — HYDROMORPHONE HYDROCHLORIDE 2 MG: 2 TABLET ORAL at 21:18

## 2024-08-21 RX ADMIN — ONDANSETRON 4 MG: 2 INJECTION INTRAMUSCULAR; INTRAVENOUS at 21:18

## 2024-08-21 RX ADMIN — ACETAMINOPHEN 975 MG: 325 TABLET ORAL at 22:58

## 2024-08-21 RX ADMIN — ACETAMINOPHEN 975 MG: 325 TABLET ORAL at 05:34

## 2024-08-21 RX ADMIN — ONDANSETRON 4 MG: 4 TABLET, ORALLY DISINTEGRATING ORAL at 12:55

## 2024-08-21 RX ADMIN — HYDROMORPHONE HYDROCHLORIDE 2 MG: 2 TABLET ORAL at 08:07

## 2024-08-21 RX ADMIN — CLOPIDOGREL 75 MG: 75 TABLET ORAL at 08:07

## 2024-08-21 RX ADMIN — HYDROMORPHONE HYDROCHLORIDE 2 MG: 2 TABLET ORAL at 16:55

## 2024-08-21 RX ADMIN — SILDENAFIL 20 MG: 20 TABLET, FILM COATED ORAL at 14:49

## 2024-08-21 NOTE — NURSING NOTE
Assessment performed; Vital signs reviewed, Sinus Rhythm on monitor. Patient c/o persistent nausea unrelieved by Zofran, reports 3 episodes of emesis overnight. Patient reports Dilaudid is slightly effective in reducing chest discomfort but right hip and left foot/toe pain remain severe. Provider updated via Epic Chat.

## 2024-08-21 NOTE — PROGRESS NOTES
Brief Nutrition Note    Recommendations     Continue current diet  and supplements-and encourage PO intake >75% of each meal, as tolerated.   Consider scheduled antiemetics prior to meals to optimize PO intake.     Clinical Course: Patient is a 63 y.o. female who was admitted on 8/8/2024 with a diagnosis of Lung nodule [R91.1]  Thoracic lymphadenopathy [R59.0]  Pulmonary edema, acute (CMS/HCC) [J81.0].     Past Medical History:   Diagnosis Date    At risk for falls     De Quervain's tenosynovitis     Essential (primary) hypertension     Follicular carcinoma of thyroid (CMS/HCC)     Gastro-esophageal reflux     Hand ulceration (CMS/HCC)     Hyperlipidemia, unspecified     Interstitial lung disease (CMS/HCC)     Leg ulcer (CMS/HCC)     Lung nodule     Lupus (CMS/HCC)     O2 dependent     On 2L    Osteomyelitis of hand (CMS/HCC)     Osteoporosis     Pulmonary hypertension (CMS/HCC)     PVD (peripheral vascular disease) (CMS/HCC)     Raynaud's disease     Severe    Reflux esophagitis     Scleroderma (CMS/HCC)     Screening mammogram for breast cancer 05/04/2021    BI-RADS category: 1 - Negative (care everywhere @ [facility])    Vertigo     Wound, open, foot     bilateral,dressing change with medihoney and mupirocin ointment by homecare nurse     Past Surgical History:   Procedure Laterality Date    CARDIAC CATHETERIZATION      COLONOSCOPY      HERNIA REPAIR      THYROIDECTOMY         Reason for Assessment  Reason For Assessment: physician consult, per organizational policy (Cx - CHF diet edu, F/U)  Diagnosis:  (pulmonary edema)    [facility] Nutrition Screen Tool  Has patient lost weight without trying?: 0-->No  Has patient been eating poorly due to decreased appetite?: 0-->No  [facility] Nutrition Screen Score: 0     Nutrition/Diet History  Intake (%): 75%    Physical Findings  Last Bowel Movement: 08/20/24  Skin: surgical incision, other (see comments) (incision on R groin-WDL, wounds on toes-dry)     Nutrition Order  Nutrition Order:  "meets nutritional requirements  Nutrition Order Comments: Regular, 2L FR  Current TF/TPN Regimen: no     Anthropometrics  Height: 167.6 cm (5' 6\")     Current Weight  Weight Method: Bed scale  Weight: 51.7 kg (114 lb)     Ideal Body Weight (IBW)  Ideal Body Weight (IBW) (kg): 59.58  % Ideal Body Weight: 84.51     Body Mass Index (BMI)  BMI (Calculated): 18.4  BMI Assessment: BMI 18.5-24.9: normal     Labs/Procedures/Meds  Lab Results Reviewed: reviewed, pertinent   Lab Results   Component Value Date    GLUCOSE 82 08/20/2024    CALCIUM 9.1 08/20/2024     08/20/2024    K 4.8 08/20/2024    CO2 26 08/20/2024     08/20/2024    BUN 30 (H) 08/20/2024    CREATININE 1.2 08/20/2024       Medications  Pertinent Medications Reviewed: reviewed, pertinent    acetaminophen  975 mg oral q6h SPENCER    amLODIPine  10 mg oral q AM    artificial tears  1 drop Both Eyes QID    atorvastatin  40 mg oral Daily (6p)    clopidogreL  75 mg oral Daily    cyanocobalamin  1,000 mcg oral Daily    ethacrynic acid  50 mg oral Daily    gabapentin  100 mg oral TID    levothyroxine  100 mcg oral Daily (6:30a)    lidocaine  1 patch Topical Daily    lidocaine  1 patch Topical Daily    lidocaine  1 patch Topical Daily    macitentan  10 mg oral Daily    metoclopramide  5 mg intravenous Once    pantoprazole  20 mg oral Daily    artificial tears  1 drop Both Eyes q4h while awake    sildenafiL (pulm.hypertension)  20 mg oral TID    sodium chloride  2 spray Each Nostril Daily    white petrolatum   Topical BID    white petrolatum-mineral oil   Both Eyes Nightly       Estimated/Assessed Needs  Additional Documentation: Calorie Requirements (Group), Protein Requirements (Group)     Skin: multiple impairments  No edema noted   UOP: 450mL   Emesis: 450mL     Clinical comments:  Pt seen for nutrition follow up. Pt with ongoing pain, n/v.   Variable appetite and PO intake. Recommend scheduled antiemetics prior to meals to optimize PO intake and tolerance. " Pt has expressed concern regarding weight and not wanting to lose more weight.   Will continue to monitor and reassess per protocol unless otherwise consulted.   Goals:PO intake >75% of nutrient needs.  Monitor:Diet order, appetite, PO intake, labs, I/Os, skin integrity, wt status, GI function.     Recommendations: See above       Date: 08/21/24  Signature: BECCA Matthews

## 2024-08-21 NOTE — NURSING NOTE
IV zofran administered  for nausea x 2; prn PO dilaudid administered for pain. Patient did complain of dizziness and requested for meclizine. MD was made aware and new order for meclizine obtained x once.

## 2024-08-21 NOTE — PLAN OF CARE
Problem: Adult Inpatient Plan of Care  Goal: Plan of Care Review  Outcome: Progressing  Flowsheets (Taken 8/20/2024 2109)  Outcome Evaluation:   PO dilaudid/gabapentin   IV zofran  Plan of Care Reviewed With: patient   Plan of Care Review  Plan of Care Reviewed With: patient  Progress: improving  Outcome Evaluation: PO dilaudid/gabapentin; IV zofran

## 2024-08-21 NOTE — ASSESSMENT & PLAN NOTE
Her acute/chronic pain has been extremely difficult to control.  She has multiple drug allergies including morphine, oxycodone, NSAIDs.  She is also very reluctant to start any new medications.  We consulted pain management who advised addition of Lyrica, Flexeril, tramadol.  Lyrica was ultimately stopped as per ophthalmology recommendations given concerns of it causing her blurry vision/dry eyes. She did not tolerate tramadol due to ?increased HR. She tolerated flexeril so will continue. Palliative care was also consulted. They recommended trial of fentanyl which pt tolerated but didn't find it helpful. Started PO dilaudid which may be contributing to nausea/vomiting/constipation however pt feels that it is the only thing that is finally helping with her pain. Also started gabapentin 200mg TID which can be uptitrated in the outpt setting. She will f/u with Pain management outpt for scheduled appointment. L ankle/L ft Xrays unremarkable.

## 2024-08-21 NOTE — PLAN OF CARE
Spoke with pt who felt she was being D/C'ed home too soon today. Medicare appeal started by pt. Clinical information sent to [employer] for the Medicare appeal. Updated [facility] via [facility] about D/C plans.

## 2024-08-21 NOTE — PROGRESS NOTES
Hospital Medicine     Daily Progress Note       SUBJECTIVE   Interval History: Patient reporting nausea/vomiting this morning.  She reports her pain is a 9 out of 10 today, which is slightly improved. We discussed that her meds ie the dilaudid is likely contributing to GI complaints. We discussed that she can stop taking this medication if she wishes or try taking it with food. She became tearful and appeared overwhelmed when be began talking about her discharge home. She expressed wants her pain under better control before she returns home. We discussed she has secure f/u with pain management outpt as we have tried many different medications while she's been here and attempting to continue to control chronic pain can be continued outpt. We discussed that we suspect she'd do better and continue to heal at home.     OBJECTIVE      Vital signs in last 24 hours:  Temp:  [36.7 °C (98 °F)-36.7 °C (98.1 °F)] 36.7 °C (98.1 °F)  Heart Rate:  [67-98] 87  Resp:  [20-22] 20  BP: (106-153)/(51-70) 106/51    Intake/Output Summary (Last 24 hours) at 8/21/2024 1538  Last data filed at 8/21/2024 1230  Gross per 24 hour   Intake 1658 ml   Output 900 ml   Net 758 ml       PHYSICAL EXAMINATION      Physical Exam  Vitals and nursing note reviewed.   Constitutional:       General: She is not in acute distress.     Comments: Chronically ill-appearing, underweight   HENT:      Head: Normocephalic.   Eyes:      Conjunctiva/sclera: Conjunctivae normal.   Cardiovascular:      Rate and Rhythm: Normal rate and regular rhythm.   Pulmonary:      Effort: Pulmonary effort is normal. No respiratory distress.      Breath sounds: Normal breath sounds.   Abdominal:      General: Bowel sounds are normal. There is no distension.      Palpations: Abdomen is soft.      Tenderness: There is no abdominal tenderness.   Musculoskeletal: Hand deformities-chronic     Cervical back: Neck supple.      Right lower leg: No edema.      Left lower leg: No edema.    Skin:     General: Skin is warm and dry.   Neurological:      Mental Status: She is alert and oriented to person, place, and time.   Psychiatric:         Behavior: Behavior normal.  Anxious appearing, tearful       LINES, CATHETERS, DRAINS, AIRWAYS, AND WOUNDS   Lines, Drains, and Airways:  Wounds (agree with documentation and present on admission):  Peripheral IV (Adult) 08/18/24 Left;Posterior Wrist (Active)   Number of days: 1       Wound Anterior;Right Toe (2nd) (Active)   Number of days: 11       Wound Anterior;Left Toe (Great) (Active)   Number of days: 11       Wound Anterior;Left Toe (2nd) (Active)   Number of days: 11       Surgical Incision Groin Right (Active)   Number of days: 5         Comments:    LABS / IMAGING / TELE      Labs  Results from last 7 days   Lab Units 08/20/24  0611   SODIUM mEQ/L 139   POTASSIUM mEQ/L 4.8   CHLORIDE mEQ/L 105   CO2 mEQ/L 26   BUN mg/dL 30*   CREATININE mg/dL 1.2   GLUCOSE mg/dL 82   CALCIUM mg/dL 9.1     Results from last 7 days   Lab Units 08/20/24  0611   WBC K/uL 8.59   HEMOGLOBIN g/dL 7.8*   HEMATOCRIT % 27.4*   PLATELETS K/uL 213     Results from last 7 days   Lab Units 08/20/24  0611   MAGNESIUM mg/dL 1.9           Imaging  No new images today      ECG/Telemetry  Reviewed    ASSESSMENT AND PLAN      Abdominal pain  Assessment & Plan  Pt reporting lower quadrant abdominal pain day after angio.  No evidence or bruising at fem access site or palpable masses, but CT demonstrated R RP hematoma. Also having intermittent nausea/vomiting.  LFTs unremarkable and lipase within normal limits.  CTAP showed an unremarkable gallbladder, pancreas.  UA bland.  CT did reveal a hematoma in the right inguinal region. Her nausea/vomiting is thought to be multifactorial in the setting of constipation and medication side effects.  Ensure bowel regimen, though patient mostly refuses this. PRN zofran. Encouraged pt to not take medications on an empty stomach. RN reported regurgitated  "food - can trial reglan/pepcid cocktail.  I do think anxiety is playing a significant role in her symptoms, I have discussed this with the patient.    Chest pain  Assessment & Plan  In regards to the chest pain. Pt has chronic chest pain which is what prompted her work up for lung pathology, however she described this chest pain as \"bony chest pain \".  Exam and history suggest MSK etiology vs serous pain from her autoimmune disease.  No concern for ACS. Pain management.    Wound of lower extremity  Assessment & Plan  she was found to have bilateral lower extremity wounds and bilateral second digit dry gangrene. Podiatry and Vascular were consulted. She underwent LLE angio, balloon angio of distal SFA, TP trunk, DCB angio of popliteal artery on 8/14. Now on Plavix 75mg daily. She has an allergy to aspirin. Continue local wound care.     Interstitial lung disease (CMS/HCC)  Assessment & Plan  DPLD  Does not appear to be in exacerbation for this  Has not tolerated trials of Mycophenolate. On Tocilizumab.   On 2L O2-NC chronically    Hematoma  Assessment & Plan  Imaging reviewed by Vascular Dr. Thomas; Probable small hematoma at the right groin near the access site extending into the preperitoneal area without evidence of contrast extravasation. There is no pseudoaneurysm on US.  --Hgb has been stable, currently 8  --Plavix restarted 8/17    Pain of both eyes  Assessment & Plan  Late 8/10 noted right eye pain,, swelling, blurry vision  Seen by ophthalmology  No concern for preseptal or orbital cellulitis  CT orbits: No evidence of acute process involving the orbits  Carotid US negative for HD significant stenosis  Advised various drops, ointments for her eyes--> per MAR has been declining eye drops as recommended by ortho  Advised stopping Lyrica  Both blurred vision and eye pain have resolved    Pain  Assessment & Plan  Her acute/chronic pain has been extremely difficult to control.  She has multiple drug allergies " including morphine, oxycodone, NSAIDs.  She is also very reluctant to start any new medications.  We consulted pain management who advised addition of Lyrica, Flexeril, tramadol.  Lyrica was ultimately stopped as per ophthalmology recommendations given concerns of it causing her blurry vision/dry eyes. She did not tolerate tramadol due to ?increased HR. She tolerated flexeril so will continue. Palliative care was also consulted. They recommended trial of fentanyl which pt tolerated but didn't find it helpful. Started PO dilaudid which may be contributing to nausea/vomiting however pt feels that it is helping with her pain. Also started gabapentin 100mg TID which can be uptitrated in the outpt setting. She will f/u with Pain management outpt for scheduled appointment.     Pulmonary nodule  Assessment & Plan  Left lower lobe nodule, s/p ION navigational bronchoscopy with biopsy showing a small focus of atypical glands.     -Pulm input appreciated  -outpatient follow up with pulmonary regarding biopsy findings    Acute on chronic heart failure with preserved ejection fraction (CMS/HCC)  Assessment & Plan  EF 65%  Known to Cardiology Dr. Arvizu, in-house consult placed  S/p IV ethacrynic acid, now back on PO  Continue pHTN meds; opsumit, revatio  CHF order set, daily weights, salt/fluid restriction, strict Is&Os      Scleroderma (CMS/HCC)  Assessment & Plan  Again, chronic  Tocilizumab every 28 days    Pulmonary hypertension (CMS/HCC)  Assessment & Plan  Chronic, extensive history    Continue home med macitentan  On sildenafil  Ethacrynic acid 50mg resumed 8/17    Essential (primary) hypertension  Assessment & Plan  Pressure stable here, continue home medications         VTE Assessment: Padua    VTE Prophylaxis:  Current anticoagulants:    None      Code Status: Full Code      Estimated Discharge Date: 8/23/2024     Disposition Planning: pending medical appeal     KARYNA Bragg  8/21/2024

## 2024-08-21 NOTE — PLAN OF CARE
Plan of Care Review  Plan of Care Reviewed With: patient  Progress: no change  Outcome Evaluation: Patient assesssment completed, VSS, telemetry monitor normal sinus rhythm. Patient reported pain in chest, toe and hip, rated a 9 on a numeric scale of 0 to 10. PRN diaudid administered as per orders. Patient reported intermintent nausea not resolved with Zofran. Satey and fall precauions n place; call bell in reach, low bed.

## 2024-08-21 NOTE — PROGRESS NOTES
Cardiology/pulmonary hypertension service progress note    Interval History:      Awake in bed, same issues today.  Pain, today the chest wall seems to be the worst. She was started on gabapentin. Not sure if it is working yet.  Had some vertigo, she notes is not new, and continued nausea/vomiting.          Background per Dr. Arvizu:    Ms. Felix is a 63 y.o. female with a past medical history of Pulmonary HTN, HTN, Raynaud's Disease, Cutaneous Systemic Scleroderma (dx 1998), ILD, PE (3/2023). She is a patient of Dr. Nguyễn. She was previously followed by Dr. Jos Valdez at [facility]. Echocardiogram from 4/21/2022 showed LVEF: 55-60%, mild aortic valve thickening, pulmonary artery systolic pressure: 52 mmHg and trivial pericardial effusion. LHC and RHC (separate occasions) over the last 5-10 years which showed normal hemodynamics and normal coronaries. She had a RHC 9/02/2020 showing top-normal right sided filling pressures with mild pulmonary hypertension, top-normal PVR and normal pulmonary capillary wedge pressure. The cardiac index was normal, seen below.     [place], [place] [facility] [place]. EKG: NSR without ischemic changes. hsTrop: 12->16, d-dimer: 0.56, CXR showed cardiomegaly and diffuse interstitial prominence.     Dr. Nguyễn ordered echocardiogram, which was completed on 8/22/2022 and showed estimated RVSP = 50-55 mmHg, normal-sized LV with normal LV systolic function. Estimated EF 55- 60%. Wall motion grossly normal, mild concentric left ventricular hypertrophy, grade I LV diastolic dysfunction, probably normal RV size and function.    At her initial visit on 10/11/2022, she reported that she felt very short of breath with minimal activity most of the time. She reports that this started about 1 year prior and had progressively gotten worse. She described PND and bendopnea. She reported that she experienced ankle swelling, worsened over the past year and usually worse towards the end of  the night. She also reported some swelling in her abdomen. She reported daily palpations. I recommended a RHC which was done 11/28/2022 and showed: Normal right sided filling pressures. Mildly elevated left sided filling pressures. Precapillary pulmonary hypertension with mean PA 36 mmHg.    She was hospitalized on 3/19/2023 at [facility] for PE diagnosed on V/Q scan. She reports that she had presented with left arm pain and heaviness. She states that she was started on Apixaban. One week after her last office visit (4/6/2023), she presented to [facility] [place] with chest pain, epistaxis and hemoptysis. Echocardiogram showed: left ventricular cavity size normal with mild left ventricular hypertrophy and preserved systolic function. Estimated EF 65%. Chest CTA showed no evidence of PE; Apixaban was discontinued.     She had a repeat echocardiogram (6/2023) which showed: Normal left ventricular cavity size and mild concentric left ventricular hypertrophy. Normal systolic function. EF: 60%. Normal right ventricular structure and function. Mild tricuspid valve regurgitation with estimated RVSP: 55 mmHg. Compared to previous study from 4/14/2023, estimated right ventricular systolic pressure were higher.    She underwent V/Q scan in September 2023, which showed intermediate-to-high probability of pulmonary embolic disease, as a result she completed CTA Chest PE which showed: no evidence for filling defect or vessel cutoff to suggest pulmonary embolism. Enlargement of the pulmonary arteries compatible with pulmonary arterial hypertension was seen.    She reports she started Macitentan around August or September 2023. She stated that she had not been checking her SpO2. She reports she did not notice any difference since starting it. She reported she had been feeling more shortness of breath at times with ADLs.     She presented to [facility] [place] on 3/3/2024 with chest pressure, palpitations and nausea. She was noted to have hypoxia  and tachypnea at presentation. She was found to have moderate-to-large pericardial effusion without tamponade. She was started on Colchicine 0.6 mg BID on 3/4/2024 and when she did not improve symptomatically, Prednisone 20 mg daily was added on 3/5/2024. On 3/7/2024, she developed multiple episodes of diarrhea and Colchicine dose was decreased to 0.3 mg. Coronary CTA, done as part of the ischemic workup, showed moderate 2 vessel CAD. She was recommended to begin Atorvastatin 20 mg daily and Clopidogrel 75 mg daily. She initially refused those two medications, but eventually agreed to take Atorvastatin.      She was discharged on Colchicine 0.3 mg for at least 3 months and Prednisone 20 mg for a total two week course until 3/19/2024, then decrease the dose to 10 mg daily for 2 weeks until 4/2/2024, then decrease dose to 5 mg daily for 2 weeks until 4/16/2024. She reports she tapered off Prednisone as instructed and has continued to take Colchicine as prescribed. She completed an echocardiogram (4/10/2024) which showed: Small-to-moderate sized, circumferential pericardial effusion. No echocardiographic evidence for cardiac tamponade. Compared to previous study from 3/4/2024, no significant change. Pericardial effusion size was similar, estimated right atrial pressure lower.      She presented to [facility] [place] (4/16/2024) with increased pleuritic chest pain. On presentation to the ER, she was noted to be hypertensive and tachycardic. Labs showed mildly elevated troponin, elevated BNP and leukocytosis. CT angiography of the chest did not show evidence of PE. It was read as large pericardial effusion, evidence of emphysema, evidence of pulmonary hypertension, 16 mm left lobe lung mass.       She presented again to [facility] [place] on 5/21/2024 at the request of her Rheumatologist. During her visit there, she was noted to be tachycardic and with some shortness of breath. Her chest pain was improved from her prior admission. On arrival, O2  "saturations were normal. ECG showed: Sinus Tachycardia (HR: 125 bpm). BNP >300, hsTrop 32 ->24. POCUS in the emergency department showed evidence of pericardial effusion with no tamponade physiology. CTA Chest did not show evidence of pulmonary embolism. There were bilateral changes radiographically concerning for volume overload. She received Methylpredinsolone 125 mg in ED. Of note, she was on Prednisone 10 mg daily at home. She was given IV diuresis. She was discharged on Furosemide 20 mg PO PRN daily for fluid weight gain / swelling.     She had brief hospitalization and was discharged on 7/23 for chest pain. She presented to the ED on 7/25/2024 for worsening of her chronic chest pain and 8/5/2024 for facial swelling and chronic chest pain.      She underwent bronchoscopic lung biopsy yesterday, 8/8/2024, with Dr. Niles Rodriguez. Pathology showed atypical glandular cells which may be consistent with adenocarcinoma in situ.     Post-procedure CXR showed increase pulmonary vascular congestion. She was admitted for optimization.       ---    Rheumatology: Dr. Trevizo  Pulmonology: Dr. Wasserman    Past Medical / Surgical History:  Pulmonary HTN, HTN, Raynaud's Disease, Cutaneous Systemic Scleroderma (dx 1998), ILD, PE (3/2023), Follicular Carcinoma of Thyroid, Tenosynovitis, de Quervain, h/o Hernia Repair, h/o Appendicitis, h/o Digit Amputation, H/o Total Thyroidectomy (Dr. Pacheco at [facility]; 4/2013)    Family & Social History: She is , she has two children. She has worked as an .     Tobacco: former 2005  EtOH: never   Illicits: never     Her brother has CAD with stent  1st cousin with SLE  Both parents had \"heart problems\"    Allergies:   Allergies   Allergen Reactions    Aspirin Shortness of breath     Other reaction(s): Unknown  \"stop breathing\"      Ibuprofen Shortness of breath     Stop breathing    Iodine Shortness of breath     Other reaction(s): Unknown    Iodine And Iodide Containing Products " Shortness of breath     ? rash    Morphine Shortness of breath      Reported wamrth of face, improved with benadryl and cold compresses after getting morphine as well as episode of (subjective ) dyspnea, and thought she passed out - did have wamrth and erythema of face with mild edema R>L cheek notably on exam.     Penicillins Shortness of breath     Other reaction(s): Unknown    Sulfa (Sulfonamide Antibiotics) Angioedema    Clindamycin     Hydrochlorothiazide      Other reaction(s): Abdominal Pain   Slow heart rate    Oxycodone      Other reaction(s): Vomiting    Sulfamethoxazole-Trimethoprim      Other reaction(s): Vomiting, Weakness     Latex Rash       Medications:   Current Facility-Administered Medications   Medication Dose Route Frequency Provider Last Rate Last Admin    acetaminophen (TYLENOL) tablet 975 mg  975 mg oral q6h SPENCER Simon Cano DO   975 mg at 08/21/24 0534    amLODIPine (NORVASC) tablet 10 mg  10 mg oral q AM Simon Cano DO   10 mg at 08/21/24 0807    artificial tears (dextran-hpm-glyc) (GENTEAL TEARS) 0.1-0.3-0.2 % eye drops 1 drop  1 drop Both Eyes QID Simon Cano DO   1 drop at 08/21/24 0810    atorvastatin (LIPITOR) tablet 40 mg  40 mg oral Daily (6p) Simon Cano DO   40 mg at 08/19/24 1836    benzocaine-menthol (CEPACOL/CHLORASEPTIC) lozenge 1 lozenge  1 lozenge Mouth/Throat q2h PRN Simon Cano DO   1 lozenge at 08/20/24 0947    benzonatate (TESSALON) capsule 100 mg  100 mg oral 3x daily PRN Simon Cano DO   100 mg at 08/18/24 1131    carboxymethylcellulose (REFRESH PLUS) 0.5 % ophthalmic dropperette 1 drop  1 drop Right Eye 3x daily PRN Simon Cano DO   1 drop at 08/20/24 0817    clopidogreL (PLAVIX) tablet 75 mg  75 mg oral Daily Simon Cano DO   75 mg at 08/21/24 0807    cyanocobalamin (VITAMIN B12) tablet 1,000 mcg  1,000 mcg oral Daily Simon Cano  Hoa DO   1,000 mcg at 08/21/24 0807    cyclobenzaprine (FLEXERIL) tablet 5 mg  5 mg oral 3x daily PRN Simon Cano DO   5 mg at 08/19/24 0610    glucose chewable tablet 16-32 g of dextrose  16-32 g of dextrose oral PRN Simon Cano DO        Or    dextrose 40 % oral gel 15-30 g of dextrose  15-30 g of dextrose oral PRN Simon Cano DO        Or    glucagon (GLUCAGEN) injection 1 mg  1 mg intramuscular PRN Simon Cano DO        Or    dextrose 50 % in water (D50) injection 12.5 g  25 mL intravenous PRN Simon Cano DO        diphenhydrAMINE (BENADRYL) capsule 50 mg  50 mg oral q8h PRN Simon Cano DO        docusate sodium (COLACE) capsule 100 mg  100 mg oral Daily PRN Simon Cano DO        ethacrynic acid (EDECRIN) tablet 50 mg  50 mg oral Daily Simon Cano DO   50 mg at 08/21/24 0808    gabapentin (NEURONTIN) capsule 100 mg  100 mg oral TID Molly Hidalgo CRNP   100 mg at 08/21/24 0808    honey (MEDIHONEY) 100 % topical paste   Topical Daily PRN Simon Cano DO   Given at 08/19/24 0830    HYDROmorphone (DILAUDID) tablet 2 mg  2 mg oral q4h PRN Molly Hidalgo CRNP   2 mg at 08/21/24 0807    levothyroxine (SYNTHROID) tablet 100 mcg  100 mcg oral Daily (6:30a) Simon Cano DO   100 mcg at 08/21/24 0535    lidocaine (ASPERCREME) 4 % topical patch 1 patch  1 patch Topical Daily Simon Cano DO   1 patch at 08/21/24 0809    lidocaine (ASPERCREME) 4 % topical patch 1 patch  1 patch Topical Daily Simon Cano DO   1 patch at 08/21/24 0809    lidocaine (ASPERCREME) 4 % topical patch 1 patch  1 patch Topical Daily Simon Cano DO   1 patch at 08/21/24 0808    macitentan (OPSUMIT) tablet 10 mg (Patient Owned)  10 mg oral Daily Simon Cano DO   10 mg at 08/21/24 0808    melatonin ODT 3 mg  3 mg oral Nightly PRN  Simon Cano DO   3 mg at 08/19/24 2125    ondansetron ODT (ZOFRAN-ODT) disintegrating tablet 4 mg  4 mg oral q6h PRN Molly Hidalgo CRNP   4 mg at 08/20/24 1757    Or    ondansetron (ZOFRAN) injection 4 mg  4 mg intravenous q6h PRN Molly Hidalgo CRNP   4 mg at 08/21/24 0649    pantoprazole (PROTONIX) tablet,delayed release (DR/EC) 20 mg  20 mg oral Daily Simon Cano DO   20 mg at 08/21/24 0807    peg 400-hypromellose-glycerin (ARTIFICIAL TEARS) 1-0.2-0.2 % eye drops 1 drop  1 drop Both Eyes q4h while awake Simon Cano DO   1 drop at 08/21/24 0810    phenoL (SORE THROAT SPRAY) 1.4 % mucosal spray 1 spray  1 spray Mouth/Throat q2h PRN Simon Cano DO        polyethylene glycol (MIRALAX) 17 gram packet 17 g  17 g oral 2x daily PRN Maddy Cope MD        senna (SENOKOT) tablet 2 tablet  2 tablet oral 2x daily PRN Maddy Cope MD        sildenafiL (pulm.hypertension) (REVATIO) tablet 20 mg  20 mg oral TID Simon Cano DO   20 mg at 08/21/24 0807    sodium chloride (OCEAN) 0.65 % nasal spray 2 spray  2 spray Each Nostril Daily Simon Cano DO   2 spray at 08/21/24 0810    white petrolatum (AQUAPHOR) ointment   Topical q4h PRN Simon Cano DO   Given at 08/13/24 2117    white petrolatum (AQUAPHOR) ointment   Topical BID Simon Cano DO   Given at 08/21/24 0810    white petrolatum-mineral oil (ARTIFICIAL TEARS OINT) ophthalmic ointment   Both Eyes Nightly Simon Cano DO   Given at 08/18/24 2049       Review of Systems:   All other systems reviewed and are negative except as per HPI.    Physical Exam:     Wt Readings from Last 10 Encounters:   08/21/24 51.7 kg (114 lb)   08/05/24 49.4 kg (109 lb)   08/05/24 49.4 kg (109 lb)   07/20/24 49.5 kg (109 lb 2 oz)   07/19/24 51.3 kg (113 lb)   06/20/24 51.7 kg (114 lb)   06/14/24 52.6 kg (116 lb)   05/23/24 52.4 kg (115 lb 9.6 oz)   04/17/24 53.5  kg (118 lb)   04/10/24 44.5 kg (98 lb)       BP Readings from Last 5 Encounters:   08/21/24 128/62   08/05/24 (!) 148/75   07/25/24 (!) 155/77   07/23/24 (!) 101/57   07/19/24 (!) 154/80       Vitals:    08/21/24 0745   BP: 128/62   Pulse: 88   Resp: 20   Temp: 36.7 °C (98.1 °F)   SpO2: 97%       Constitutional: NAD, AAOx3  HENT: Normocephalic, atraumatic head, sclerae anicteric, no cervical lymphadenopathy, trachea midline, facial swelling  Cardiovascular: RRR, no murmurs, rubs or gallops, JVP: 10 cm H2O   cm H2O, no carotid bruits.  Respiratory: CTA bilateral lung fields, no wheezes, rales or rhonchi  GI: soft, non-tender/non-distended  Musculoskeletal / Extremities: Bilateral hip edema noted, +2 pulses bilateral radial, DP/PT arteries, skin tightening on face and fingertips, ulceration and discoloration of 2nd phalange b/l LE.   Skin: no rashes. Facial changes c/w scleroderma  Neuro / Psych: no focal deficits, CNII-XII grossly intact, appropriate, cooperative    Diagnostic Data:     Component      Latest Ref Rng 7/20/2024 7/25/2024   Sodium      136 - 145 mEQ/L 142  139    Potassium, Bld      3.5 - 5.1 mEQ/L 4.2  4.3    Chloride      98 - 107 mEQ/L 107  103    CO2      21 - 31 mEQ/L 25  27    BUN      7 - 25 mg/dL 17  17    Creatinine      0.6 - 1.2 mg/dL 1.2  1.1    Glucose      70 - 99 mg/dL 85  86    Calcium      8.6 - 10.3 mg/dL 9.3  10.6 (H)    AST (SGOT)      13 - 39 IU/L  15    ALT (SGPT)      7 - 52 IU/L  8    Alkaline Phosphatase      34 - 125 IU/L  82    Total Protein      6.0 - 8.2 g/dL  8.8 (H)    Albumin      3.5 - 5.7 g/dL  4.3    Bilirubin, Total      0.3 - 1.2 mg/dL  0.4    eGFR      >=60.0 mL/min/1.73m*2 51.0 (L)  56.6 (L)    Anion Gap      3 - 15 mEQ/L 10  9       Component      Latest Ref Rng 7/20/2024 7/25/2024   WBC      3.80 - 10.50 K/uL 7.27  10.29    Hemoglobin      11.8 - 15.7 g/dL 9.8 (L)  11.2 (L)    Hematocrit      35.0 - 45.0 % 32.0 (L)  37.3    MCV      83.0 - 98.0 fL 80.4 (L)  80.4  (L)    Platelets      150 - 369 K/uL 206  331       Component      Latest Ref Rng 7/19/2024 7/25/2024 8/9/2024   High Sens Troponin I      <15.0 pg/mL 89.2 (HH)  66.8 (HH)  92.5 (HH)    High Sens Troponin I       90.3 (HH)  83.4 (HH)         Component      Latest Ref Rng 5/22/2024 6/22/2024 7/19/2024 7/25/2024   BNP      <=100 pg/mL 352 (H)  150 (H)  151 (H)  220 (H)       Component      Latest Ref Rng 4/16/2024 5/23/2024 7/21/2024 7/22/2024   Sed Rate      0 - 30 mm/hr 101 (H)  79 (H)  105 (H)  >119 (H)       Component      Latest Ref Rn 4/18/2024 5/24/2024   WBC      3.80 - 10.50 K/uL 8.40  16.22 (H)    RBC      3.93 - 5.22 M/uL 3.54 (L)  3.82 (L)    Hemoglobin      11.8 - 15.7 g/dL 9.8 (L)  10.3 (L)    Hematocrit      35.0 - 45.0 % 32.8 (L)  33.9 (L)    Platelets      150 - 369 K/uL 254  289       Component      Latest Ref Rn 5/24/2024   Magnesium      1.8 - 2.5 mg/dL 2.1      Component      Latest Ref Rn 4/13/2023 3/7/2024 5/22/2024   TSH      0.34 - 5.60 mIU/L 4.71  0.28 (L)  1.03      Component      Latest Ref Rng 5/24/2024   Sodium      136 - 145 mEQ/L 138    Potassium, Bld      3.5 - 5.1 mEQ/L 4.4    Chloride      98 - 107 mEQ/L 104    CO2      21 - 31 mEQ/L 24    BUN      7 - 25 mg/dL 40 (H)    Creatinine      0.6 - 1.2 mg/dL 1.1    Glucose      70 - 99 mg/dL 87    Calcium      8.6 - 10.3 mg/dL 9.5    eGFR      >=60.0 mL/min/1.73m*2 56.6 (L)    Anion Gap      3 - 15 mEQ/L 10      Component      Latest Ref Rng 4/13/2023   Hemoglobin A1C      <5.7 % 4.4        Component      Latest Ref Rng 4/14/2023   Triglycerides      30 - 149 mg/dL 44    Cholesterol      <=200 mg/dL 194    HDL      >=55 mg/dL 49 (L)    LDL Calculated      <=100 mg/dL 136 (H)    Non-HDL, Calculated      mg/dL 145    RISK      <=5.0  4.0       Component      Latest Ref Rng 4/13/2023   High Sens Troponin I      <15.0 pg/mL 23.0 (H)       Component      Latest Ref Rng 4/14/2023   Ferritin      11 - 250 ng/mL 75      Component      Latest  Ref Rng 4/14/2023   Iron      35 - 150 ug/dL 29 (L)    TIBC      270 - 460 ug/dL 245 (L)    UIBC      180 - 360 ug/dL 216    Iron Saturation      15 - 45 % 12 (L)        Component      Latest Ref Rng 6/27/2022 4/13/2023   BNP      <=100 pg/mL 111 (H)  105 (H)                          ---    ECG (8/2/2024, personally reviewed):   Sinus tachycardia   Left ventricular hypertrophy with repolarization abnormality    HR: 117 bpm     ---        Lower extremity arterial duplex and ENRRIQUE August 10, 2024:  Right ankle ENRRIQUE (DP): 0.74.  Right ankle ENRRIQUE (PT): 0.88.  Left ankle ENRRIQUE (DP): 0.90.  Left ankle ENRRIQUE (PT): 0.62.    Findings concerning for hemodynamically significant stenosis within the  bilateral popliteal arteries and distal left femoral artery. There are also  findings which can be seen with inflow stenosis at the level left common femoral  artery. CTA of the abdomen and pelvis with lower extremity runoff could be  performed for further evaluation if clinically indicated.    CT Chest / Abdomen / Pelvis w/ Contrast (7/25/2024, personally reviewed):     Lung bases: Left-sided pleural effusion, left parenchymal consolidation, and  findings of interstitial lung disease.  More nodular density in the medial  aspect of the left lower lobe measuring 1.6 x 1.9 cm.  Cardiomegaly with a  pericardial effusion.     Liver: The liver is normal in size.  There is no focal mass.  Hepatic veins and  portal veins are patent without focal filling defects to suggest thrombosis..     Gallbladder:  Small dependent gallstones.  No gallbladder wall thickening..     Spleen: Spleen is normal in size without focal mass lesion..     Pancreas: The pancreas is normal without focal mass, parenchymal atrophy, or  pancreatic duct dilation..     Adrenals: The adrenals are normal bilaterally without focal mass..     Kidneys, ureters and bladder:  Symmetric enhancement and excretion..  Left lower  pole cystic lesion measures 3.2 cm in maximal dimension.   This measures greater  than simple fluid density.  This measured 60 Hounsfield units on the noncontrast  CT from 2020.  It measures 36 Hounsfield units on today's study.  Therefore it  is most in keeping with a hemorrhagic/proteinaceous cyst.  Additional  hypodensities too small to characterize.     Retroperitoneal structures: There is no abdominal aortic aneurysm.  Atherosclerotic calcification.  There is no retroperitoneal adenopathy or  retroperitoneal mass..     Bowel and mesentery: No evidence of a focal inflammatory or obstructive process.  Moderate fecal retention throughout the colon.  There are a few fluid-filled  small bowel loops in the pelvis, nonspecific.     Lymph nodes: No pathologic lymphadenopathy..     Pelvis: The uterus is normal in size.  The ovaries are normal.  There is no  adnexal mass or pelvic free fluid.     Bones: Within normal limits.    No evidence for pulmonary embolism.  Persistent left-sided pleural effusion and airspace disease at the left lung  base.  Underlying interstitial lung disease.     TTE (7/19/2024, personally reviewed):   Mild increased wall thickness with normal left ventricular cavity and preserved systolic function. EF 65%. No wall motion abnormalities.   Normal right ventricular size with preserved systolic function.   IVC normal in size with >50% respiratory variation consistent with normal right sided filling pressures.   Small pericardial effusion. No evidence of hemodynamic compromise with normal respiratory variation and no chamber collapse. Prominent epicardial fat.   Compared with previous study of 5/23/2024, small pericardial effusion persists.      PET/CT Skull-to-Thigh (6/4/2024):   An approximately 1.6 cm pulmonary nodule at the medial left lung base is  significantly FDG avid at max SUV 8.5.  This is nonspecific and can be seen in  the setting of benign pathology however malignancy is a concern.     There is more mild to moderate nonspecific uptake  localizing to lita-fissural  nodules particularly on the left.     There is  moderate nonspecific left hilar and mediastinal dawson uptake.     No additional suspicious FDG avid findings appreciated.     CTA Chest (5/21/2024):   IMPRESSION:  1.  No evidence of acute pulmonary embolism.  2.  Multiple new perifissural left lower lobe nodules, suspicious for a  neoplastic process. PET/CT and or tissue sampling is recommended.  3.  Chronic interstitial lung disease in an NSIP pattern, with overall slightly  progressed appearance since March 2023. Pulmonary consultation is recommended.  4.  Stable cardiomegaly and large pericardial effusion.  5.  Mediastinal and supraclavicular adenopathy, similar to prior study, also  indeterminate, which could be further evaluated at time of PET/CT.     CTA Chest (4/16/2024):   IMPRESSION:  1. No evidence of pulmonary embolism.  2. Stable cardiomegaly and large pericardial effusion. Cardiology consultation  suggested.  3. Emphysema with bilateral lower lobe groundglass opacity and bronchiectasis in  an NSIP pattern, which can be seen with scleroderma. Pulmonary consultation  suggested.  4. Stable 16 mm left lower lobe masslike density which may represent  atelectasis, lung cancer or lymphoma. When clinically appropriate PET/CT  suggested.  5. Stable prominence of the main pulmonary artery which can be seen with  pulmonary hypertension     TTE (5/23/2024, personally reviewed):  Mild increased wall thickness with normal left ventricular cavity and preserved systolic function. EF 65%. No wall motion abnormalities.   Normal right ventricular size with preserved systolic function.   Tiny IVC with >50% respiratory variation consistent with low-normal right sided filling pressures.   Small pericardial effusion, predominantly adjacent to right atrium. No evidence of hemodynamic compromise with normal respiratory variation and no chamber collapse. Prominent epicardial fat.   Compared with  previous study of 4/10/24, small pericardial effusion persists.      TTE (4/10/2024, personally reviewed):  1. Normal left ventricular cavity size with mild concentric left ventricular hypertrophy. Normal systolic function. EF: 60%. No regional wall motion abnormalities. Cannot determine diastolic function. Global longitudinal strain not reported due to technical limitations of study.   2. Aortic valve has three cusps. Trace aortic regurgitation. Aortic valve and root sclerosis.  3. Thickened mitral valve leaflets. Trace mitral valve regurgitation. Normal left atrial size.   4. Normal right ventricular structure and function. Trace tricuspid valve regurgitation with estimated RVSP: 51 mmHg (assuming right atrial pressure of 3 mmHg). Normal right atrial size. Pulmonic valve structurally normal. Trace pulmonic valve regurgitation. Pulsed wave Doppler of the RVOT systolic profile shows decreased acceleration times and geometry consistent with mildly elevated PVR.  5. IVC size is normal with > 50% inspiratory collapse consistent with normal right atrial pressure. Small-to-moderate sized, circumferential pericardial effusion. No echocardiographic evidence for cardiac tamponade.   6. Compared to previous study from 3/4/2024, no significant change. Pericardial effusion size is similar, estimated right atrial pressure lower.           CTA Abd / Pelvis (3/13/2024, personally reviewed):   There is dilatation of the of proximal/mid small bowel with relatively abrupt  transition left hemiabdomen, likely obstruction. Questionable lesion at the  transition point. This could be better evaluated with CT or MRI enterography.     Aorta and major branches patent. Superior mesenteric artery patent without  significant stenosis.  Suspect moderate stenosis of the origins of the of renal arteries bilaterally.     Large pericardial effusion, no change.     Patchy opacities lower lungs, similar to prior.      Coronary CTA (3/11/2024):   1.  Two-vessel coronary artery disease as above that is moderate in severity.  CT  FFR is normal..  2. There is mitral annular calcification and aortic sclerosis.  The right atrium  is enlarged.  There is left ventricular hypertrophy.  There is a moderate to  large circumferential pericardial effusion.  3. Normal left ventricular wall motion and systolic function.  4. Coronary calcium score 313, 96th percentile     TTE (3/4/2024, personally reviewed):   Normal-sized left ventricle. Mild left ventricular hypertrophy. Preserved systolic function. LVEF 60%. No regional wall motion abnormalities. Grade II diastolic dysfunction.  Normal global longitudinal strain (-20%).  The aortic valve is not well seen.  Cannot determine the number of cusps.  Sclerotic leaflets.  No aortic stenosis.  Trace aortic insufficiency.  Normal sized aortic root.  The visualized portion of the ascending aorta is normal in size.  The mitral valve leaflets are thickened. Mild mitral annular calcification. Trace mitral regurgitation.   Mildly dilated left atrium.  Normal-sized right ventricle. Normal right ventricular systolic function.  Thickened tricuspid valve. There is mild tricuspid regurgitation with estimated RVSP of 46 mmHg.   Pulmonic valve is not well visualized. Trace pulmonic regurgitation.   Mildly dilated right atrium.  IVC is enlarged with <50% respiratory variation consistent with elevated right atrial filling pressure (at least 15 mmHg).   Moderate, circumferential, circumferential pericardial effusion.  The largest pocket is located inferolaterally.  Elevated right atrial pressure.  No other clear echocardiographic features of increased pericardial pressure.  Correlate clinically.   Compared to previous TTE from 6/21/2023, pericardial effusion now moderate in size.  Right atrial pressure now elevated.         CTA Chest (3/3/2024):   IMPRESSION:  1.  No evidence of acute pulmonary embolism to the level of the segmental  branches.  2.   Moderate to large pericardial effusion measuring higher than simple fluid  density.  3.  Lower lobe lung field predominant interstitial thickening with some relative  subpleural sparing, consistent with a chronic interstitial lung disease,  unchanged from the prior study.  4.  Continued bilateral axillary, mediastinal, and bilateral hilar  lymphadenopathy, not definitely changed from the prior study, possibly related  to the patient's sarcoid.  5.  Emphysema.      CTA Chest PE (10/4/2023):  IMPRESSION:  1. No evidence for filling defect or vessel cutoff to suggest pulmonary  embolism.  Enlargement of the pulmonary arteries compatible with pulmonary  arterial hypertension.  2. Changes throughout the lungs as described above which can be seen with  systemic sclerosis/scleroderma.  Underlying pneumonia the lung bases cannot be  entirely excluded in the proper clinical setting.  3.  Additional stable findings as described above.        VQ (9/12/2023):  IMPRESSION:  Intermediate to high probability of pulmonary embolic disease.     6MWT (7/25/2023, on Sildenafil 20 mg TID):        TTE (6/21/2023, personally reviewed):  1. Normal left ventricular cavity size and mild concentric left ventricular hypertrophy. Normal systolic function. EF: 60%. No regional wall motion abnormalities. Grade I diastolic dysfunction.   2. Aortic valve cusps not well visualized. Trace aortic regurgitation. Aortic valve and root sclerosis.  3. Thickened mitral valve leaflets. Mild mitral annular calcification. Trace mitral regurgitation. Mildly dilated left atrial size.   4. Normal right ventricular structure and function. Mild tricuspid valve regurgitation with estimated RVSP: 55 mmHg (assuming right atrial pressure of 3 mmHg). Normal right atrial size. Pulmonic valve not well visualized. Trace pulmonic valve regurgitation. Pulsed wave Doppler of the RVOT systolic profile show decreased acceleration times ( msec) and late systolic notching  consistent with elevated PVR.   5. IVC size is normal with > 50% inspiratory collapse consistent with normal right atrial pressure. Small pericardial effusion without echocardiographic evidence of tamponade.  6. Compared to previous study from 4/14/2023, estimated right ventricular systolic pressure is higher.        TTE (4/14/2023, personally reviewed):   The left ventricular cavity size is normal with mild left ventricular hypertrophy and preserved systolic function. Estimated EF 65%. No regional wall motion abnormalities. Grade I diastolic dysfunction.     The aortic valve is tricuspid. Aortic valve sclerosis. Trace aortic regurgitation.      The visualized portions of the aortic root and ascending aorta are normal in size.     The mitral valve is mildly thickened. Mild mitral annular calcification. Trace mitral regurgitation.       The left atrium is severely dilated.      The right ventricle is normal in size with preserved systolic function     The pulmonic valve is not well seen.     The tricuspid valve is normal in appearance. mild tricuspid regurgitation with an estimated RVSP of 34 mmHg.       Right atrium is normal in size.     The IVC is small and collapses > 50% with inspiration consistent with normal right atrial pressures.     Small pericardial effusion, mostly posterior.       Compared to previous echocardiogram from 8/22/2022, there is no significant change        TTE (11/28/2022, personally reviewed):   Normal right- and mildly elevated left-sided filling pressures (RA: 3 mmHg, PCWP: 13 mmHg)  Elevated pulmonary artery pressures (60/22(35 mmHg)) in the setting of PCWP: 13 mmHg.   Normal cardiac output and index (CO: 5.1 L/min, CI: 3.2 L/min/m2)  PVR: 4.3 Wood units. SVR: 1840 dynes/sec/cm5      TTE (8/22/2022, personally reviewed):  Normal-sized LV. Normal LV systolic function. Estimated EF 55- 60%. Wall motion appears grossly normal. Mild concentric left ventricular hypertrophy. Grade I LV  diastolic dysfunction.  Pulmonic valve not well visualized. Grossly normal pulmonic valve structure. Trace pulmonic valve regurgitation. No pulmonic valve stenosis.  Aortic valve not well visualized. Aortic valve not well seen but likely trileaflet. Focal aortic valve calcification present. Sclerotic aortic valve leaflets. Mild aortic valve regurgitation. No aortic valve stenosis.  RV not well visualized. Normal-sized RV. Normal RV systolic function.  Normal-sized RA.  There is a small loculated pericardial effusion anterior to the right atrium. No cardiac tamponade.  Sclerotic mitral valve. Mitral valve calcification of the anterior leaflet present.  Trace mitral valve regurgitation.  No significant mitral valve stenosis.  Normal-sized IVC. IVC demonstrates normal respiratory collapse.  Tricuspid valve structure is grossly normal. Mild to moderate tricuspid valve regurgitation.  Estimated RVSP = 50-55 mmHg.  Mildly dilated LA.  No previous echocardiogram available for comparison.     TTE (4/21/2022, outside study):  This result has an attachment that is not available.     A complete transthoracic echocardiogram (including 2D, color flow   Doppler, spectral Doppler and M-mode imaging) was performed using the   standard protocol. The study quality was adequate.     The left ventricle is normal in size. There is moderately increased   wall thickness consistent with moderate concentric hypertrophy.   Endocardium is incompletely visualized, but no regional wall motion   abnormalities are noted in the visualized segments. Normal left   ventricular ejection fraction. The left ventricular ejection fraction by   visual estimate is 55% to 60%.     The right ventricle is normal in size. There is normal function of the   right ventricle.     The left atrium is normal in size. Left atrial volume is 58 mL.     The right atrial volume measures 52 mL. The right atrial volume index   measures 34 mL/m2.     There is trace mitral  regurgitation. There is no mitral stenosis.     The aortic valve is trileaflet. There is mild aortic valve thickening.   There is no aortic stenosis. There is trace aortic regurgitation.     There is mild to moderate tricuspid regurgitation. Pulmonary artery   systolic pressure measures 52 mmHg. The pulmonary artery systolic pressure   is moderately elevated.     The aorta measures 3.2 cm at the sinus of Valsalva. The proximal   segment of the ascending aorta is mildly enlarged. The proximal segment of   the ascending aorta measures 3.4 cm. The proximal segment of the ascending   aorta measures 2.2 cm/m2, indexed for body surface area.     Trivial pericardial effusion present. There is no echocardiographic   evidence of cardiac tamponade.     The inferior vena cava is normal in size (diameter <21 mm) and   decreases >50% in size with inspiration, suggesting a normal right atrial   pressure of 3 mmHg (range 0-5 mm Hg).     Compared to the prior study of 3/13/2020, there are no significant   changes.        PFT (3/23/2022):      PFT (7/14/2021):      CT Chest (5/2/2021, outside study):  CLINICAL INFORMATION: Systemic sclerosis. Interstitial lung disease. Groundglass nodule     PROCEDURE: Unenhanced CT of the chest was performed using thin section reconstructions. Images were obtained on inspiration and expiration.     COMPARISON: June and August 2020     FINDINGS:     LUNGS, PLEURA:     Groundglass opacities with reticulation with subpleural sparing in the lower lobes, without honeycombing or architectural distortion, unchanged.     Right upper lobe groundglass nodule, image 114 of series 3, 8 x 11 mm, previously 6 mm.     Expiratory images are of diagnostic quality and do not demonstrate evidence of clinically significant air trapping.     CARDIOVASCULAR, MEDIASTINUM, THYROID: Coronary calcifications. Trace pericardial effusion.     LYMPH NODES: Subcentimeter mediastinal lymph nodes, unchanged. Unchanged axillary  lymph nodes.     SKELETON, CHEST WALL: No destructive bone lesion     UPPER ABDOMEN: Patulous esophagus. 3 mm right renal stone.       RHC (9/02/2020):  RA   8 mmHg   RV   40/5 mmHg   PA (mean)  44/16 (25) mmHg   PCWP   12 mmHg   CO   4.65 lpm (Thermodilution)   CI   2.65 lpm/m-squared   SVI    33 ml/beat/m-squared (lower limit normal 29)   PVR   2.8 mmHg/l/min (Wood units)   SVR   2064 dyne-sec-cm-5         PFT (3/18/2020):      TTE (3/13/2020, outside study):  · The study quality was good.   · The left ventricle is normal in size. Top normal left ventricular wall   thickness. There are no segmental wall motion abnormalities. The left   ventricular ejection fraction is 55-60% by visual estimate and 3D   echocardiography. Normal left ventricular ejection fraction.   · There is grade I (mild) LV diastolic dysfunction.   · The right ventricle is normal in size. There is normal function of the   right ventricle.   · The right atrium is mildly dilated.   · There is mild mitral valve anterior leaflet thickening. There is mild   mitral valve leaflet calcification. There is flattening of the mitral   leaflets without raphael prolapse. There is trace mitral regurgitation.   · The aortic valve is trileaflet. There is mild thickening of the aortic   valve. There is mild calcification of the aortic valve. There is no aortic   /stenosis. There is trace aortic regurgitation.   · There is mild tricuspid valve leaflet thickening. There is mild to   moderate tricuspid regurgitation. The pulmonary artery systolic pressure   is moderately elevated. Pulmonary artery systolic pressure measures 50   mmHg. There is mid-systolic notching of the RVOT VTI consistent with   elevated pulmonary vascular resistance.   · The inferior vena cava is normal in size (diameter <21 mm) and decreases   >50% in size with inspiration, suggesting a normal right atrial pressure   of 3 mmHg (range 0-5 mm Hg).   · Trivial loculated pericardial effusion present  adjacent to the right   ventricle and adjacent to the right atrium.          Assessment / Plan:     1. Chest Pain:  - Given pattern and character, I believe this is musculoskeletal / serous pain from her autoimmune disease. There may be an element of myopericarditis.  - She reports that pain has not responded to increased in steroids in the past   - Non-ACS chest pain and known non-obstructive CAD as recently as March 2024 CCTA.   -A lidocaine patch is in place.  Hopefully this will help her pain.    2. Pulmonary Hypertension (WHO Group I, NYHA/WHO FC III):   - Occurring in the background of Systemic Scleroderma with ILD. August 2022 TTE showed normal RV size and function, but progressive exertional decline, worsened DLCO and resting tachycardia suggest there may be progression of her pulmonary vascular disease and limitation of cardiac output. This was proven with 11/2022 RHC showing mean PA 35 mmHg (with PCWP 13 mmHg) and PVR 4.3 Wood units. Recent TTEs have shown appropriate RV:PA coupling with normal RV size and systolic function.   - She is hypervolemic on exam  - CXR improved today  -Recommend to continue diuresis, ethacrynic acid 50 mg p.o. daily. This is higher than home dose so hopefully will achieve diuresis.  Please continue daily standing weights.  Please start recording I's and O's.    - Continue Sildenafil 20 mg TID   - She should continue uninterrupted Macitentan 10 mg daily -her home medication has been brought in and she is receiving it.    3. HTN:   - Continue Amlodipine.     4. Systemic Scleroderma / Raynaud's Disease:   - Per Rheumatology (Dr. Trevizo).      5. ILD:   - Per Pulmonology and Rheumatology (Dr. Trevizo). She has not tolerated trials of Mycophenolate. She briefly received Tocilizumab.     6. PE:   - She has had elevated probability on V/Q scans, but CTA Chest has consistently not shown evidence for acute or chronic thromboembolism. Apixaban has been discontinued.     7. CAD:   -  Two-vessel non-obstructive disease on March 2024 CCTA. She is continued on Atorvastatin 40 mg daily.     8. Toe Ulcerations  - Per Rheumatology, unlikely to see LE ulcerations with scleroderma. S/P left PTA dSFA/TP and DCB to popliteal arteries 8/14/24. Followed by vascular surgery. Started on Plavix which is currently being held due to hematoma found on CT scan along with hemoglobin drop.    9. Lung Mass  - Pathology from 8/8 biopsy with atypical glandular cells, possibly consistent with adenocarcinoma  - Management per pulmonology    KARYNA Carmona  8/21/2024

## 2024-08-22 ENCOUNTER — APPOINTMENT (INPATIENT)
Dept: RADIOLOGY | Facility: HOSPITAL | Age: 64
DRG: 515 | End: 2024-08-22
Attending: NURSE PRACTITIONER
Payer: COMMERCIAL

## 2024-08-22 PROBLEM — R11.2 NAUSEA & VOMITING: Status: ACTIVE | Noted: 2024-08-15

## 2024-08-22 PROCEDURE — 63700000 HC SELF-ADMINISTRABLE DRUG: Performed by: INTERNAL MEDICINE

## 2024-08-22 PROCEDURE — 73620 X-RAY EXAM OF FOOT: CPT | Mod: LT

## 2024-08-22 PROCEDURE — 97535 SELF CARE MNGMENT TRAINING: CPT | Mod: GO

## 2024-08-22 PROCEDURE — 99233 SBSQ HOSP IP/OBS HIGH 50: CPT | Performed by: NURSE PRACTITIONER

## 2024-08-22 PROCEDURE — 63700000 HC SELF-ADMINISTRABLE DRUG: Performed by: STUDENT IN AN ORGANIZED HEALTH CARE EDUCATION/TRAINING PROGRAM

## 2024-08-22 PROCEDURE — 63700000 HC SELF-ADMINISTRABLE DRUG: Performed by: NURSE PRACTITIONER

## 2024-08-22 PROCEDURE — 25000000 HC PHARMACY GENERAL: Performed by: HOSPITALIST

## 2024-08-22 PROCEDURE — 63600000 HC DRUGS/DETAIL CODE: Mod: JZ | Performed by: HOSPITALIST

## 2024-08-22 PROCEDURE — 21400000 HC ROOM AND CARE CCU/INTERMEDIATE

## 2024-08-22 PROCEDURE — 73600 X-RAY EXAM OF ANKLE: CPT | Mod: LT

## 2024-08-22 PROCEDURE — 74018 RADEX ABDOMEN 1 VIEW: CPT

## 2024-08-22 PROCEDURE — 99233 SBSQ HOSP IP/OBS HIGH 50: CPT | Performed by: STUDENT IN AN ORGANIZED HEALTH CARE EDUCATION/TRAINING PROGRAM

## 2024-08-22 RX ORDER — METOCLOPRAMIDE HYDROCHLORIDE 5 MG/ML
5 INJECTION INTRAMUSCULAR; INTRAVENOUS ONCE
Status: COMPLETED | OUTPATIENT
Start: 2024-08-22 | End: 2024-08-22

## 2024-08-22 RX ORDER — BISACODYL 10 MG/1
10 SUPPOSITORY RECTAL DAILY PRN
Status: DISCONTINUED | OUTPATIENT
Start: 2024-08-22 | End: 2024-08-23

## 2024-08-22 RX ORDER — METOCLOPRAMIDE 10 MG/1
5 TABLET ORAL EVERY 6 HOURS PRN
Status: DISCONTINUED | OUTPATIENT
Start: 2024-08-22 | End: 2024-08-23

## 2024-08-22 RX ORDER — PANTOPRAZOLE SODIUM 40 MG/1
40 TABLET, DELAYED RELEASE ORAL DAILY
Status: DISCONTINUED | OUTPATIENT
Start: 2024-08-23 | End: 2024-09-03 | Stop reason: HOSPADM

## 2024-08-22 RX ORDER — POLYETHYLENE GLYCOL 3350 17 G/17G
17 POWDER, FOR SOLUTION ORAL 2 TIMES DAILY
Status: DISCONTINUED | OUTPATIENT
Start: 2024-08-22 | End: 2024-08-23

## 2024-08-22 RX ORDER — FAMOTIDINE 20 MG/1
20 TABLET, FILM COATED ORAL DAILY PRN
Status: DISCONTINUED | OUTPATIENT
Start: 2024-08-22 | End: 2024-09-03 | Stop reason: HOSPADM

## 2024-08-22 RX ORDER — DEXTROMETHORPHAN POLISTIREX 30 MG/5 ML
133 SUSPENSION, EXTENDED RELEASE 12 HR ORAL ONCE
Status: COMPLETED | OUTPATIENT
Start: 2024-08-22 | End: 2024-08-22

## 2024-08-22 RX ORDER — FAMOTIDINE 10 MG/ML
20 INJECTION INTRAVENOUS DAILY PRN
Status: DISCONTINUED | OUTPATIENT
Start: 2024-08-22 | End: 2024-09-03 | Stop reason: HOSPADM

## 2024-08-22 RX ORDER — SENNOSIDES 8.6 MG/1
2 TABLET ORAL 2 TIMES DAILY
Status: DISCONTINUED | OUTPATIENT
Start: 2024-08-22 | End: 2024-09-03 | Stop reason: HOSPADM

## 2024-08-22 RX ORDER — METOCLOPRAMIDE HYDROCHLORIDE 5 MG/ML
10 INJECTION INTRAMUSCULAR; INTRAVENOUS ONCE
Status: DISCONTINUED | OUTPATIENT
Start: 2024-08-22 | End: 2024-08-22

## 2024-08-22 RX ORDER — FAMOTIDINE 10 MG/ML
20 INJECTION INTRAVENOUS ONCE
Status: COMPLETED | OUTPATIENT
Start: 2024-08-22 | End: 2024-08-22

## 2024-08-22 RX ORDER — GABAPENTIN 100 MG/1
200 CAPSULE ORAL 3 TIMES DAILY
Status: DISCONTINUED | OUTPATIENT
Start: 2024-08-22 | End: 2024-09-03 | Stop reason: HOSPADM

## 2024-08-22 RX ADMIN — DEXTRAN 70, GLYCERIN, HYPROMELLOSE 1 DROP: 1; 2; 3 SOLUTION/ DROPS OPHTHALMIC at 17:33

## 2024-08-22 RX ADMIN — DEXTRAN 70, GLYCERIN, HYPROMELLOSE 1 DROP: 1; 2; 3 SOLUTION/ DROPS OPHTHALMIC at 08:42

## 2024-08-22 RX ADMIN — ACETAMINOPHEN 975 MG: 325 TABLET ORAL at 23:34

## 2024-08-22 RX ADMIN — ETHACRYNIC ACID 50 MG: 25 TABLET ORAL at 08:50

## 2024-08-22 RX ADMIN — GLYCERIN 1 DROP: .002; .002; .01 SOLUTION/ DROPS OPHTHALMIC at 08:42

## 2024-08-22 RX ADMIN — POLYETHYLENE GLYCOL 3350 17 G: 17 POWDER, FOR SOLUTION ORAL at 12:16

## 2024-08-22 RX ADMIN — METOCLOPRAMIDE 5 MG: 10 TABLET ORAL at 12:16

## 2024-08-22 RX ADMIN — DEXTRAN 70, GLYCERIN, HYPROMELLOSE 1 DROP: 1; 2; 3 SOLUTION/ DROPS OPHTHALMIC at 23:36

## 2024-08-22 RX ADMIN — GABAPENTIN 100 MG: 100 CAPSULE ORAL at 14:03

## 2024-08-22 RX ADMIN — CYANOCOBALAMIN TAB 1000 MCG 1000 MCG: 1000 TAB at 08:41

## 2024-08-22 RX ADMIN — BISACODYL 10 MG: 10 SUPPOSITORY RECTAL at 12:16

## 2024-08-22 RX ADMIN — BENZONATATE 100 MG: 100 CAPSULE ORAL at 08:41

## 2024-08-22 RX ADMIN — ONDANSETRON 4 MG: 4 TABLET, ORALLY DISINTEGRATING ORAL at 03:57

## 2024-08-22 RX ADMIN — HYDROMORPHONE HYDROCHLORIDE 2 MG: 2 TABLET ORAL at 21:54

## 2024-08-22 RX ADMIN — SENNOSIDES 2 TABLET: 8.6 TABLET, FILM COATED ORAL at 08:42

## 2024-08-22 RX ADMIN — SENNOSIDES 2 TABLET: 8.6 TABLET, FILM COATED ORAL at 21:47

## 2024-08-22 RX ADMIN — LIDOCAINE 4% 1 PATCH: 40 PATCH TOPICAL at 08:40

## 2024-08-22 RX ADMIN — PANTOPRAZOLE SODIUM 20 MG: 20 TABLET, DELAYED RELEASE ORAL at 08:41

## 2024-08-22 RX ADMIN — LEVOTHYROXINE SODIUM 100 MCG: 0.1 TABLET ORAL at 06:18

## 2024-08-22 RX ADMIN — POLYETHYLENE GLYCOL 3350 17 G: 17 POWDER, FOR SOLUTION ORAL at 21:48

## 2024-08-22 RX ADMIN — LIDOCAINE 4% 1 PATCH: 40 PATCH TOPICAL at 08:41

## 2024-08-22 RX ADMIN — MINERAL OIL 133 ML: 100 ENEMA RECTAL at 17:33

## 2024-08-22 RX ADMIN — WHITE PETROLATUM: 1.75 OINTMENT TOPICAL at 08:42

## 2024-08-22 RX ADMIN — SILDENAFIL 20 MG: 20 TABLET, FILM COATED ORAL at 21:47

## 2024-08-22 RX ADMIN — SILDENAFIL 20 MG: 20 TABLET, FILM COATED ORAL at 14:03

## 2024-08-22 RX ADMIN — HYDROMORPHONE HYDROCHLORIDE 2 MG: 2 TABLET ORAL at 08:50

## 2024-08-22 RX ADMIN — DEXTRAN 70, GLYCERIN, HYPROMELLOSE 1 DROP: 1; 2; 3 SOLUTION/ DROPS OPHTHALMIC at 14:04

## 2024-08-22 RX ADMIN — HYDROMORPHONE HYDROCHLORIDE 2 MG: 2 TABLET ORAL at 02:09

## 2024-08-22 RX ADMIN — ATORVASTATIN CALCIUM 40 MG: 40 TABLET, FILM COATED ORAL at 17:33

## 2024-08-22 RX ADMIN — CYCLOBENZAPRINE HYDROCHLORIDE 5 MG: 5 TABLET, FILM COATED ORAL at 14:04

## 2024-08-22 RX ADMIN — GLYCERIN 1 DROP: .002; .002; .01 SOLUTION/ DROPS OPHTHALMIC at 05:50

## 2024-08-22 RX ADMIN — ACETAMINOPHEN 975 MG: 325 TABLET ORAL at 17:33

## 2024-08-22 RX ADMIN — METOCLOPRAMIDE 5 MG: 5 INJECTION, SOLUTION INTRAMUSCULAR; INTRAVENOUS at 07:45

## 2024-08-22 RX ADMIN — ACETAMINOPHEN 975 MG: 325 TABLET ORAL at 12:14

## 2024-08-22 RX ADMIN — ONDANSETRON 4 MG: 4 TABLET, ORALLY DISINTEGRATING ORAL at 12:16

## 2024-08-22 RX ADMIN — ACETAMINOPHEN 975 MG: 325 TABLET ORAL at 05:50

## 2024-08-22 RX ADMIN — GLYCERIN 1 DROP: .002; .002; .01 SOLUTION/ DROPS OPHTHALMIC at 12:17

## 2024-08-22 RX ADMIN — HYDROMORPHONE HYDROCHLORIDE 2 MG: 2 TABLET ORAL at 14:03

## 2024-08-22 RX ADMIN — CLOPIDOGREL 75 MG: 75 TABLET ORAL at 08:41

## 2024-08-22 RX ADMIN — BENZOCAINE 6 MG-MENTHOL 10 MG LOZENGES 1 LOZENGE: at 08:41

## 2024-08-22 RX ADMIN — AMLODIPINE BESYLATE 10 MG: 10 TABLET ORAL at 08:41

## 2024-08-22 RX ADMIN — GABAPENTIN 200 MG: 100 CAPSULE ORAL at 21:47

## 2024-08-22 RX ADMIN — SILDENAFIL 20 MG: 20 TABLET, FILM COATED ORAL at 08:41

## 2024-08-22 RX ADMIN — METOCLOPRAMIDE 5 MG: 10 TABLET ORAL at 21:48

## 2024-08-22 RX ADMIN — FAMOTIDINE 20 MG: 10 INJECTION, SOLUTION INTRAVENOUS at 07:46

## 2024-08-22 RX ADMIN — GABAPENTIN 100 MG: 100 CAPSULE ORAL at 08:41

## 2024-08-22 RX ADMIN — SALINE NASAL SPRAY 2 SPRAY: 1.5 SOLUTION NASAL at 08:42

## 2024-08-22 RX ADMIN — CYCLOBENZAPRINE HYDROCHLORIDE 5 MG: 5 TABLET, FILM COATED ORAL at 08:42

## 2024-08-22 ASSESSMENT — COGNITIVE AND FUNCTIONAL STATUS - GENERAL
CLIMB 3 TO 5 STEPS WITH RAILING: 2 - A LOT
DRESSING REGULAR LOWER BODY CLOTHING: 3 - A LITTLE
HELP NEEDED FOR BATHING: 3 - A LITTLE
AFFECT: WFL
WALKING IN HOSPITAL ROOM: 3 - A LITTLE
CLIMB 3 TO 5 STEPS WITH RAILING: 2 - A LOT
EATING MEALS: 4 - NONE
STANDING UP FROM CHAIR USING ARMS: 3 - A LITTLE
WALKING IN HOSPITAL ROOM: 3 - A LITTLE
DRESSING REGULAR UPPER BODY CLOTHING: 3 - A LITTLE
HELP NEEDED FOR PERSONAL GROOMING: 3 - A LITTLE
TOILETING: 3 - A LITTLE
MOVING TO AND FROM BED TO CHAIR: 3 - A LITTLE
MOVING TO AND FROM BED TO CHAIR: 3 - A LITTLE
STANDING UP FROM CHAIR USING ARMS: 3 - A LITTLE

## 2024-08-22 NOTE — ASSESSMENT & PLAN NOTE
In the setting of extensive stool burden  No vomiting today, nausea improved.   -See constipation problem  -Continue Zofran 4 mg IV every 6 hours ATC  -Continue Reglan 5 mg PO TID as needed

## 2024-08-22 NOTE — PLAN OF CARE
Problem: Adult Inpatient Plan of Care  Goal: Plan of Care Review  Outcome: Progressing  Flowsheets (Taken 8/22/2024 0546)  Progress: improving  Outcome Evaluation: pt is aaox3, complaints of toe and sternum pain controlled with tyelnol and oxy PRN. no falls, pt is a standby oob to bathroom. N/V continued, pt given zofran multiple times overnight, 2 episodes of vomitting. wound care continued. a medicare appeal was started 8/21 d/t pt not being ready to be discharged. no further needs at this time  Plan of Care Reviewed With: patient  Goal: Patient-Specific Goal (Individualized)  Outcome: Progressing  Flowsheets (Taken 8/22/2024 0546)  Patient/Family-Specific Goals (Include Timeframe): nausea and vomitting managenent  Individualized Care Needs: assist  oob to bathroom, setup food/tray  Anxieties, Fears or Concerns: nausea  Goal: Absence of Hospital-Acquired Illness or Injury  Outcome: Progressing  Goal: Optimal Comfort and Wellbeing  Outcome: Progressing  Goal: Readiness for Transition of Care  Outcome: Progressing     Problem: Fall Injury Risk  Goal: Absence of Fall and Fall-Related Injury  Outcome: Progressing     Problem: Skin Injury Risk Increased  Goal: Skin Health and Integrity  Outcome: Progressing     Problem: Mobility Impairment  Goal: Optimal Mobility  Outcome: Progressing     Problem: Self-Care Deficit  Goal: Improved Ability to Complete Activities of Daily Living  Outcome: Progressing   Plan of Care Review  Plan of Care Reviewed With: patient  Progress: improving  Outcome Evaluation: pt is aaox3, complaints of toe and sternum pain controlled with tyelnol and oxy PRN. no falls, pt is a standby oob to bathroom. N/V continued, pt given zofran multiple times overnight, 2 episodes of vomitting. wound care continued. a medicare appeal was started 8/21 d/t pt not being ready to be discharged. no further needs at this time

## 2024-08-22 NOTE — PLAN OF CARE
Care Coordination Discharge Plan Note     Discharge Needs Assessment  Concerns to be Addressed: discharge planning  Current Discharge Risk: chronically ill, physical impairment    Anticipated Discharge Plan  Anticipated Discharge Disposition: home with assistance, home with home health  Type of Home Care Services: nursing, home PT, home OT    Patient Choice  Offered/Gave Vendor List: yes  Patient's Choice of Community Agency(s): [facility]    Patient and/or patient guardian/advocate was made aware of their right to choose a provider. A list of eligible providers was presented and reviewed with the patient and/or patient guardian/advocate in written and/or verbal form. The list delineates providers in the patient’s desired geographic area who are participating in the Medicare program and/or providers contracted with the patient’s primary insurance. The Medicare list and quality ratings were obtained from the Medicare.gov [medicare.gov] website.    ---------------------------------------------------------------------------------------------------------------------    Interdisciplinary Discharge Plan Review:  Participants:physician, , social work/services, nursing, occupational therapy, pharmacy, physical therapy    Concerns Comments: Tentative D/C home with [facility]. Updated [facility] about Medicare appeal.    Discharge Plan:   Disposition/Destination: Home Health Care - Other / Home    Discharge Transportation:  Is Out of Hospital DNR needed at Discharge: no  Does patient need discharge transport? No

## 2024-08-22 NOTE — PLAN OF CARE
Problem: Adult Inpatient Plan of Care  Goal: Plan of Care Review  Outcome: Progressing  Flowsheets (Taken 8/22/2024 1122)  Outcome Evaluation: pain/ nauea management, medicare appeal in process.  Plan of Care Reviewed With: patient  Goal: Patient-Specific Goal (Individualized)  Outcome: Progressing  Flowsheets (Taken 8/22/2024 1122)  Patient/Family-Specific Goals (Include Timeframe): n/v bowle management  Individualized Care Needs: assist x1 oob  Anxieties, Fears or Concerns: nausea   Plan of Care Review  Plan of Care Reviewed With: patient  Progress: no change  Outcome Evaluation: pain/ nauea management, medicare appeal in process.

## 2024-08-22 NOTE — PROGRESS NOTES
Occupational Therapy -  Daily Treatment/Progress Note     Patient: Arielle Felix  Location: [place] [address]  MRN: [id number]  Today's date: 8/22/2024    HISTORY OF PRESENT ILLNESS     Arielle is a 63 y.o. female admitted on 8/8/2024 with Lung nodule [R91.1]  Thoracic lymphadenopathy [R59.0]  Pulmonary edema, acute (CMS/HCC) [J81.0]. Principal problem is Pulmonary edema, acute (CMS/HCC).    Past Medical History  Arielle has a past medical history of At risk for falls, De Quervain's tenosynovitis, Essential (primary) hypertension, Follicular carcinoma of thyroid (CMS/HCC), Gastro-esophageal reflux, Hand ulceration (CMS/HCC), Hyperlipidemia, unspecified, Interstitial lung disease (CMS/HCC), Leg ulcer (CMS/HCC), Lung nodule, Lupus (CMS/HCC), O2 dependent, Osteomyelitis of hand (CMS/HCC), Osteoporosis, Pulmonary hypertension (CMS/HCC), PVD (peripheral vascular disease) (CMS/HCC), Raynaud's disease, Reflux esophagitis, Scleroderma (CMS/HCC), Screening mammogram for breast cancer (05/04/2021), Vertigo, and Wound, open, foot.    History of Present Illness   Ms Felix is a 63 y.o. 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 (+) intractable bony chest    Admitted following lung biopsy in the setting of pulmonary edema.         Additionally, post-procedure she was found to have intractably bony chest pain which has not been responsive to tylenol.      8/9 podiatry consulted for  necrosis of the left second toe along with other chronic BLE wounds. WBAT to BLE      Late 8/10 noted right eye pain,, swelling, blurry vision, ophthalmology consulted; CTH neg for acute intracranial abnormalities  -improving with lubricant drops, r eye also improving.        8/10 BLE arterial US: indings concerning  "for hemodynamically significant stenosis within bilat popliteal arteries and distal left femoral artery\"     8/11 Ophthalmology was consulted for concerns of right eye swelling and blurry vision.     8/14:  S/p LLE angio, balloon angio of distal SFA, TP trunk, DCB angio of popliteal artery on 8/14.   Per general surgery note on 8/15 OOB/light walking as tolerated     (8/15) CT Abd Hematoma in the right inguinal region     8/22: L ankle XR: (-) fracture/dislocation    8/22 L foot XR: There is diffuse osteopenia. There is no definite fracture or dislocation. Joint space narrowing is seen at the first metatarsophalangeal joint. There is extension of the digits relative to the phalanx is at the second through fourth metatarsophalangeal joints.    8/22: Abdom xray: Extensive stool retention in the colon.   PRIOR LEVEL OF FUNCTION AND LIVING ENVIRONMENT     Prior Level of Function      Flowsheet Row Most Recent Value   Dominant Hand right   Ambulation assistive person   Transferring assistive person   Toileting assistive person   Bathing assistive equipment and person   Dressing assistive person   Eating independent   IADLs assistive person   Driving/Transportation friends/family   Communication understands/communicates without difficulty   Prior Level of Function Comment Pt reports amb with A from caregivers or furniture surfing, A for ADLs/IADLs. Pt wears 2L O2 at home, and has 24/7 caregivers.   Assistive Device Currently Used at Home wheelchair, oxygen, stair glide             Prior Living Environment      Flowsheet Row Most Recent Value   People in Home alone   Current Living Arrangements home   Living Environment Comment Pt lives in 2SH with 5-6 LARRY, FF to b/b with stairglide, with tub/shower set up.          Occupational Profile      Flowsheet Row Most Recent Value   Successful Occupations did not state          VITALS AND PAIN        Objective   OBJECTIVE     Start time:  1424  End time:  1444  Session Length: 20 " min       General Observations  Patient received reclined, in bed. She was no issues or concerns identified by nurse prior to session, agreeable to therapy. agreeable, motivated    Precautions: fall              OT Eval and Treat - 08/22/24 1424          Cognition    Orientation Status oriented x 3     Affect/Mental Status WFL     Follows Commands WFL     Cognitive Function executive function deficit;safety deficit     Executive Function Deficit minimal deficit;insight/awareness of deficits     Safety Deficit minimal deficit;insight into deficits/self-awareness     Comment, Cognition Pt A&O, following commands and meaningfully participating in OT session. benefits from therapeutic encouragement. slight decr insight into current [facility] performance status        Bed Mobility    Bed Mobility Activities supine to sit;left;scooting/bridging     [place] supervision;minimum assist (75% or more patient effort)     Safety/Cues increased time to complete     Assistive Device head of bed elevated;bed rails     Comment OOB to L side with incr time/effort. heavy use of bedrail, denies dizziness with positional change. Min to boost up in bed. in modified chair position at end of session        Mobility Belt    Mobility Belt Used During Session no - medical contraindication     Reason Mobility Belt Not Used significant abdonimal pain        Sit/Stand Transfer    Surface edge of bed     [place] minimum assist (75% or more patient effort)     Safety/Cues minimal;verbal cues;tactile cues;hand placement;technique     Assistive Device other (see comments)     Transfer Comments from/to EOB with HHA. slow to rise, denies dizziness with positional change. endorsing dizzines/HR noted to be elevated with prolonged OOB activity. quick to sit EOB        Functional Mobility    Distance in room/bathroom     Functional Mobility [place] minimum assist (75% or more patient effort)     Safety/Cues minimal;verbal cues;hand  placement;technique     Assistive Device other (see comments)     Functional Mobility Comments Pt performing limited HH distance with HHA. req incr time/effort. HR elevating to 131 with limited OOB activity. Pt endorsing incr dizziness/fatigue, eyes closing. BP elevated after OOB activity. HR decr to 98 with seated rest break        Grooming    Tasks washes, rinses and dries hands;oral care (brushing teeth, cleaning dentures)     [place] close supervision     Position unsupported standing     Adaptive Equipment none     Comment unsupported standing sinkside, engaging BUEs in bimanual task. HR elevated to 118        Toileting    Comment offered, deferred        Balance    Static Sitting Balance WFL;sitting, edge of bed     Dynamic Sitting Balance WFL;sitting, edge of bed     Sit to Stand Dynamic Balance mild impairment;supported     Static Standing Balance mild impairment;supported     Dynamic Standing Balance mild impairment;supported     Balance Interventions occupation based/functional task     Comment, Balance Ax1 with HHA. balance impacted by decr activity tolerance/endurance/pain        Impairments/Safety Issues    Impairments Affecting Function balance;endurance/activity tolerance;pain;shortness of breath     Functional Endurance decline from baseline     Safety Issues Affecting Function insight into deficits/self-awareness                                       Session Outcome  Patient reclined, in bed at end of session, bed alarm on, all needs met, call light in reach, personal items in reach. Nursing notified about change in vital signs, patient's performance, patient's position, and patient's response to therapy/activity.    AM-PAC™ - ADL (Current Function)     Putting on/taking off regular lower body clothing 3 - A Little   Bathing 3 - A Little   Toileting 3 - A Little   Putting on/taking off regular upper body clothing 3 - A Little   Help for taking care of personal grooming 3 - A Little   Eating  meals 4 - None   AM-PAC™ ADL Score 19      ASSESSMENT AND PLAN     Progress Summary  OT tx complete. Arielle cont to progress toward OT goals. CLS-min for bed mob, Min wth HHA for func transfers/limted func mob/sinkside ADLs. HR elevated to 131 with OOB activity and endorsing dizziness/fatigue. BP WFL upon return to bed. Her func performance cont to be impacted by pain, decr activity tolerance/endurance/strength. Cont to rec d/c home with resumption of full time care routine and HHOT. Cont with OT POC    Patient/Family Therapy Goal Statement: to get some rest    OT Plan      Flowsheet Row Most Recent Value   Rehab Potential good, to achieve stated therapy goals at 08/22/2024 1424   Therapy Frequency 4 times/wk at 08/22/2024 1424   Planned Therapy Interventions activity tolerance training, occupation/activity based interventions, patient/caregiver education/training, strengthening exercise, transfer/mobility retraining, adaptive equipment training, BADL retraining, functional balance retraining, neuromuscular control/coordination retraining, passive ROM/stretching, ROM/therapeutic exercise at 08/22/2024 1424            OT Discharge Recommendations      Flowsheet Row Most Recent Value   OT Recommended Discharge Disposition home with assistance, home with home health at 08/22/2024 1424   Anticipated Equipment Needs if Discharged Home (OT) none at 08/16/2024 0954                 OT Goals      Flowsheet Row Most Recent Value   Bed Mobility Goal 1    Activity/Assistive Device bed mobility activities, all at 08/16/2024 0954   [place] modified independence at 08/16/2024 0954   Time Frame by discharge at 08/16/2024 0954   Progress/Outcome goal ongoing at 08/16/2024 0954   Transfer Goal 1    Activity/Assistive Device all transfers at 08/16/2024 0954   [place] modified independence at 08/16/2024 0954   Time Frame by discharge at 08/16/2024 0954   Progress/Outcome goal ongoing at 08/16/2024 0954   Dressing Goal 1     Activity/Adaptive Equipment dressing skills, all at 08/16/2024 0954   [place] modified independence at 08/16/2024 0954   Time Frame by discharge at 08/16/2024 0954   Progress/Outcome goal ongoing at 08/16/2024 0954   Toileting Goal 1    Activity/Assistive Device toileting skills, all at 08/16/2024 0954   [place] modified independence at 08/16/2024 0954   Time Frame by discharge at 08/16/2024 0954   Progress/Outcome goal ongoing at 08/16/2024 0954

## 2024-08-22 NOTE — PLAN OF CARE
Problem: Adult Inpatient Plan of Care  Goal: Plan of Care Review  8/22/2024 1129 by Ewa Griffin, RN  Flowsheets (Taken 8/22/2024 1129)  Progress: no change  8/22/2024 1122 by Ewa Griffin RN  Outcome: Progressing  Flowsheets (Taken 8/22/2024 1122)  Outcome Evaluation: pain/ nauea management, medicare appeal in process.  Plan of Care Reviewed With: patient  Goal: Patient-Specific Goal (Individualized)  Outcome: Progressing  Flowsheets (Taken 8/22/2024 1122)  Patient/Family-Specific Goals (Include Timeframe): n/v bowle management  Individualized Care Needs: assist x1 oob  Anxieties, Fears or Concerns: nausea   Plan of Care Review  Plan of Care Reviewed With: patient  Progress: no change  Outcome Evaluation: pain/ nauea management, medicare appeal in process.

## 2024-08-22 NOTE — PROGRESS NOTES
Palliative Care Progress Note    This is Hospital Day: 15    Conversation/Goals of Care: Life prolongation/restorative.       Nausea & vomiting  Assessment & Plan  In the setting of extensive stool burden  -See constipation problem  -Agree w/ Reglan 5 mg every 6 hours as needed.   -Would limit Zofran use 2/2 constipation.     Other constipation  Assessment & Plan  -LBM : 8/20  -Worsened constipation likely multifactorial r/t current hospitalization, heart failure on diuretic therapy, debility related to multiple medical comorbid conditions and patient previously declining bowel regimen.   -CT abdomen 8/9 with moderate colonic stool burden   -KUB 8/22 showing extensive colon stool burden    Plan:  - Agree w/ Miralax BID   - Agree w/ Senna 2 tablets BID  -Dulcolax suppository given today without relief  -Recommend mineral oil enema X1 today        Pain  Assessment & Plan  - Chronic pain attributed to: chronic chest pain, scleroderma c/b pulmonary HTN/ILD, Raynaud's Disease  - New right lower abdominal pain worse after LLE angio on 8/14, extensive stool burden may also be contributory.   -Left upper chest/rib pain improved, left foot and right groin pain is unchanged.   - Patient with numerous allergies to various pain medications (opioids/NSAIDS). She reports no relief with Fentanyl.  She has used dilaudid in the past with some relief.   - PDMP queried: no recent scripts  - Home regimen: lidocaine patch  - Medications available inpatient: Acetaminophen 975 mg p.o. every 6 hours, lidocaine patches, cyclobenzaprine 5 mg p.o. 3 times daily as needed, heating pad  - Tramadol d/c'd 2/2 dizziness  - lyrica d/c'd 2/2 blurred vision  -Recommend increasing Gabapentin to 200 mg TID today (CRCL 41-43)  -Continue Dilaudid 2 mg PO every 4 hours as needed       Plan:  - Would establish care with a pain management provider        Palliative care by specialist  Assessment & Plan  63 y.o. female with a PMH significant for severe  scleroderma complicated by pulmonary hypertension, ILD.  [place] [facility] [place] planned robotic navigational and EBUS bronchoscopies to evaluate growing LLL nodule c/b chest pain and volume overload.     - Code status: Full Code  - Prognosis:  High risk for acute deterioration and decline  - Capacity for Medical Decision Making: intact  - Advance Directives: No  - Goals of care: Goals are Life-prolonging/disease directed.  Refer to ACP note dated 8/10.  - Surrogate Decision Maker: Patient identifies her 2 adult children, Tiffanie and Wagner, as joint surrogate decision makers        Debility  Assessment & Plan  - Debility likely multifactorial in the setting of multiple chronic medical conditions including scleroderma, CHF, PVD s/p LLE angio c/b retroperitoneal hematoma.   - Living situation prior to hospitalization: Lives at home with 24/7 aides  - Baseline functional status is: Ambulatory short distances  - Current Palliative Performance Status: 40%  - Baseline Palliative Performance Status:  50-60%    - Frailty   - Patient remains high risk for further deterioration and functional decline.    Plan  - Patient open to both palliative bridge and home based palliative NP visits            Interval History (Subjective)    Source: chart review,  patient, nurse and primary medical team    One episode of nausea overnight, Patient awake, alert, oriented X4, reporting improved left chest/rib pain. She continues to report 10/10 tingling, pressure left foot pain and 10/10 tight right groin pain. KUB showing extensive stool burden.      Current Medications:    acetaminophen, 975 mg, oral, q6h SPENCER    amLODIPine, 10 mg, oral, q AM    artificial tears, 1 drop, Both Eyes, QID    atorvastatin, 40 mg, oral, Daily (6p)    benzocaine-menthol, 1 lozenge, Mouth/Throat, q2h PRN    benzonatate, 100 mg, oral, 3x daily PRN    bisacodyL, 10 mg, rectal, Daily PRN    carboxymethylcellulose, 1 drop, Right Eye, 3x daily PRN     clopidogreL, 75 mg, oral, Daily    cyanocobalamin, 1,000 mcg, oral, Daily    cyclobenzaprine, 5 mg, oral, 3x daily PRN    glucose, 16-32 g of dextrose, oral, PRN **OR** dextrose, 15-30 g of dextrose, oral, PRN **OR** glucagon, 1 mg, intramuscular, PRN **OR** dextrose 50 % in water (D50), 25 mL, intravenous, PRN    diphenhydrAMINE, 50 mg, oral, q8h PRN    ethacrynic acid, 50 mg, oral, Daily    famotidine, 20 mg, intravenous, Daily PRN **OR** famotidine, 20 mg, oral, Daily PRN    gabapentin, 200 mg, oral, TID    honey, , Topical, Daily PRN    HYDROmorphone, 2 mg, oral, q4h PRN    levothyroxine, 100 mcg, oral, Daily (6:30a)    lidocaine, 1 patch, Topical, Daily    lidocaine, 1 patch, Topical, Daily    lidocaine, 1 patch, Topical, Daily    macitentan, 10 mg, oral, Daily    melatonin, 3 mg, oral, Nightly PRN    metoclopramide, 5 mg, oral, q6h PRN    mineral oil, 133 mL, rectal, Once    ondansetron ODT, 4 mg, oral, q6h PRN **OR** ondansetron, 4 mg, intravenous, q6h PRN    [START ON 8/23/2024] pantoprazole, 40 mg, oral, Daily    artificial tears, 1 drop, Both Eyes, q4h while awake    phenol, 1 spray, Mouth/Throat, q2h PRN    polyethylene glycol, 17 g, oral, BID    senna, 2 tablet, oral, BID    sildenafiL (pulm.hypertension), 20 mg, oral, TID    sodium chloride, 2 spray, Each Nostril, Daily    white petrolatum, , Topical, q4h PRN    white petrolatum, , Topical, BID    white petrolatum-mineral oil, , Both Eyes, Nightly    clopidogreL    cyclobenzaprine    ethacrynic acid    gabapentin    honey    HYDROmorphone    ondansetron    polyethylene glycol    Objective    Physical Exam:  General:   No Acute Distress  Eyes:  Sclera Anicteric  ENMT:  Mucus Membranes Moist  CV:  Radial Pulses 3  bilateral  Lungs:  No evidence of increased WOB  Abdomen:  Bowel Sounds present  Psych:  Appropriate and Cooperative  Neuro:  Awake, Alert and Oriented  person, place, time/date and situation  Skin:  No Rash    Vitals:  Vitals:    08/22/24 1116    BP:    Pulse: 88   Resp:    Temp:    SpO2:        Laboratory Studies:    CBC Results         08/20/24 08/19/24 08/18/24     0611 0622 1612    WBC 8.59 7.79 10.61    RBC 3.24 3.27 3.26    HGB 7.8 8.0 7.9    HCT 27.4 27.3 27.1    MCV 84.6 83.5 83.1    MCH 24.1 24.5 24.2    MCHC 28.5 29.3 29.2     184 192          CMP Results         08/20/24 08/19/24 08/18/24     0611 0622 0945     140 140    K 4.8 4.4 4.2    Cl 105 106 109    CO2 26 27 23    Glucose 82 79 101    BUN 30 25 30    Creatinine 1.2 1.1 1.1    Calcium 9.1 9.1 9.0    Anion Gap 8 7 8    AST -- 11 14    ALT -- 9 11    Albumin -- 3.3 3.4    EGFR 51.0 56.6 56.6       Troponin I Results         08/09/24 08/09/24 07/25/24     1232 1048 1732    HS Troponin I 93.8 92.5 66.8       ABG Results         04/13/23     1949    Source Of Oxygen nc         Labs reviewed-stable anemia    Imaging and Other Studies:     Radiology Imaging    XR ABDOMEN 1 VW    Narrative  CLINICAL HISTORY: Abdominal distention.    COMMENT: A single frontal radiograph of the abdomen and pelvis was performed.  There is cardiomegaly. There is no free air. There is a large amount of fecal  material throughout the colon. No opaque foreign bodies.    Impression  IMPRESSION: Extensive stool retention in the colon.                                                                      Cardiac Imaging    TRANSTHORACIC ECHO (TTE) LIMITED 07/19/2024    Interpretation Summary  Rhythm is sinus tachycardia.    Mild increased wall thickness with normal left ventricular cavity and preserved systolic function. EF 65%. No wall motion abnormalities.  Normal right ventricular size with preserved systolic function.  IVC normal in size with >50% respiratory variation consistent with normal right sided filling pressures.  Small pericardial effusion. No evidence of hemodynamic compromise with normal respiratory variation and no chamber collapse. Prominent epicardial fat.  Compared with previous study of  5/23/2024, small pericardial effusion persists.       I have independently reviewed the pertinent imaging to the time of note and agree with reported results.      Lab Results   Component Value Date    QTCCALCULAT 473 08/13/2024       Discussed with: Medical Team, RN     KARYNA Pop  Palliative Care Nurse Practitioner  [facility]  [phone] [facility] office  [phone] [facility] Fax  [address]  [place] [phone]  Pager [phone]

## 2024-08-22 NOTE — PROGRESS NOTES
Hospital Medicine     Daily Progress Note       SUBJECTIVE   Interval History: Patient reports the pain in her chest has improved but she is still having severe pain in her left ankle and her right groin.  She is still having nausea/vomiting.  She reports she is able to keep food down sometimes.  She does sometimes regurgitate food.  She feels that her belly is more bloated than usual and she is not passing gas. Had a BM yesterday.     OBJECTIVE      Vital signs in last 24 hours:  Temp:  [36.7 °C (98 °F)-37 °C (98.6 °F)] 36.7 °C (98 °F)  Heart Rate:  [70-88] 88  Resp:  [16-20] 16  BP: (106-127)/(51-58) 107/56    Intake/Output Summary (Last 24 hours) at 8/22/2024 1405  Last data filed at 8/22/2024 0405  Gross per 24 hour   Intake 1185 ml   Output 400 ml   Net 785 ml       PHYSICAL EXAMINATION      Physical Exam  Vitals and nursing note reviewed.   Constitutional:       General: She is not in acute distress.     Comments: Chronically ill-appearing, underweight   HENT:      Head: Normocephalic.   Eyes:      Conjunctiva/sclera: Conjunctivae normal.   Cardiovascular:      Rate and Rhythm: Normal rate and regular rhythm.   Pulmonary:      Effort: Pulmonary effort is normal. No respiratory distress.      Breath sounds: Normal breath sounds.   Abdominal:      General: Bowel sounds are normal. Mild abdominal distention     Palpations: Abdomen is soft.      Tenderness: There is no abdominal tenderness.   Musculoskeletal: Hand deformities-chronic     Cervical back: Neck supple.      Right lower leg: No edema.      Left lower leg: No edema.   Skin:     General: Skin is warm and dry.   Neurological:      Mental Status: She is alert and oriented to person, place, and time.   Psychiatric:         Behavior: Behavior normal.        LINES, CATHETERS, DRAINS, AIRWAYS, AND WOUNDS   Lines, Drains, and Airways:  Wounds (agree with documentation and present on admission):  Peripheral IV (Adult) 08/21/24 Anterior;Upper;Left Arm  (Active)   Number of days: 1       Wound Anterior;Right Toe (2nd) (Active)   Number of days: 14       Wound Anterior;Left Toe (Great) (Active)   Number of days: 14       Wound Anterior;Left Toe (2nd) (Active)   Number of days: 14       Wound Arterial Ulcer Anterior;Right Ankle (Active)   Number of days: 2       Wound Arterial Ulcer Anterior;Left Ankle (Active)   Number of days: 2       Wound Anterior;Left Toe (5th) (Active)   Number of days: 2       Surgical Incision Groin Right (Active)   Number of days: 8         Comments:    LABS / IMAGING / TELE      Labs  No new labs      Imaging  X-RAY FOOT LEFT 2 VIEWS    Result Date: 8/22/2024  Narrative: History: Left foot pain. COMMENT: A single view of the left foot is performed and compared to prior from 4/9/2024. Please note that this is a limited. Only one views obtained and the fifth digit is partly secured by overlying third and fourth digits. There is diffuse osteopenia. There is no definite fracture or dislocation. Joint space narrowing is seen at the first metatarsophalangeal joint. There is extension of the digits relative to the phalanx is at the second through fourth metatarsophalangeal joints.     Impression: IMPRESSION: Limited single view of the left foot. Osteopenia. No gross fracture.    X-RAY ABDOMEN 1 VIEW    Result Date: 8/22/2024  Narrative: CLINICAL HISTORY: Abdominal distention. COMMENT: A single frontal radiograph of the abdomen and pelvis was performed. There is cardiomegaly. There is no free air. There is a large amount of fecal material throughout the colon. No opaque foreign bodies.     Impression: IMPRESSION: Extensive stool retention in the colon.    X-RAY ANKLE LEFT 2 VIEWS    Result Date: 8/22/2024  Narrative: CLINICAL HISTORY: Left ankle pain. COMMENT: 2 views of the left ankle are submitted for review. Comparison is made to x-ray of the left foot performed 4/9/2024. There is diffuse osteopenia. No focal bone lesion. There is no evidence  for acute fracture or dislocation. There is no evidence for joint effusion. There is no soft tissue swelling.     Impression: IMPRESSION: Osteopenia. No acute fracture or dislocation of the left ankle.          ECG/Telemetry  Reviewed    ASSESSMENT AND PLAN      Interstitial lung disease (CMS/HCC)  Assessment & Plan  DPLD  Does not appear to be in exacerbation for this  Has not tolerated trials of Mycophenolate. On Tocilizumab.   On 2L O2-NC chronically    Hematoma  Assessment & Plan  Imaging reviewed by Vascular Dr. Thomas; Probable small hematoma at the right groin near the access site extending into the preperitoneal area without evidence of contrast extravasation. There is no pseudoaneurysm on US.  --Hgb has been stable, currently 8  --Plavix restarted 8/17    Nausea & vomiting  Assessment & Plan  Pt reporting lower quadrant abdominal pain day after angio.  No evidence or bruising at fem access site or palpable masses, but CT demonstrated R RP hematoma. Also having intermittent nausea/vomiting.  LFTs unremarkable and lipase within normal limits.  CTAP showed an unremarkable gallbladder, pancreas.  UA bland.  CT did reveal a hematoma in the right inguinal region. She is afebrile without leukocytosis.    Her nausea/vomiting is thought to be multifactorial in the setting of constipation and medication side effects.      KUB with extensive stool retention in the colon  Will give miralax and suppository today, pt has been refusing bowel regimen and because of this it was made PRN  PRN zofran and reglan together  Encouraged pt to not take medications on an empty stomach.   PRN pepcid and increase Protonix to 40mg daily in case GERD playing role  I do think anxiety is playing a significant role in her symptoms, I have discussed this with the patient.    Wound of lower extremity  Assessment & Plan  she was found to have bilateral lower extremity wounds and bilateral second digit dry gangrene. Podiatry and Vascular were  "consulted. She underwent LLE angio, balloon angio of distal SFA, TP trunk, DCB angio of popliteal artery on 8/14. Now on Plavix 75mg daily. She has an allergy to aspirin. Continue local wound care.     Pain of both eyes  Assessment & Plan  Late 8/10 noted right eye pain,, swelling, blurry vision  Seen by ophthalmology  No concern for preseptal or orbital cellulitis  CT orbits: No evidence of acute process involving the orbits  Carotid US negative for HD significant stenosis  Advised various drops, ointments for her eyes--> per MAR has been declining eye drops as recommended by ortho  Advised stopping Lyrica  Both blurred vision and eye pain have resolved    Pain  Assessment & Plan  Her acute/chronic pain has been extremely difficult to control.  She has multiple drug allergies including morphine, oxycodone, NSAIDs.  She is also very reluctant to start any new medications.  We consulted pain management who advised addition of Lyrica, Flexeril, tramadol.  Lyrica was ultimately stopped as per ophthalmology recommendations given concerns of it causing her blurry vision/dry eyes. She did not tolerate tramadol due to ?increased HR. She tolerated flexeril so will continue. Palliative care was also consulted. They recommended trial of fentanyl which pt tolerated but didn't find it helpful. Started PO dilaudid which may be contributing to nausea/vomiting however pt feels that it is helping with her pain. Also started gabapentin 100mg TID which can be uptitrated in the outpt setting. She will f/u with Pain management outpt for scheduled appointment. L ankle/L ft Xrays unremarkable.     Chest pain  Assessment & Plan  In regards to the chest pain. Pt has chronic chest pain which is what prompted her work up for lung pathology, however she described this chest pain as \"bony chest pain \".  Exam and history suggest MSK etiology vs serous pain from her autoimmune disease.  No concern for ACS. Pain management.    Pulmonary " nodule  Assessment & Plan  Left lower lobe nodule, s/p ION navigational bronchoscopy with biopsy showing a small focus of atypical glands.     -Pulm input appreciated  -outpatient follow up with pulmonary regarding biopsy findings    Acute on chronic heart failure with preserved ejection fraction (CMS/HCC)  Assessment & Plan  EF 65%  Known to Cardiology Dr. Arvizu, in-house consult placed  S/p IV ethacrynic acid, now back on PO  Continue pHTN meds; opsumit, revatio  CHF order set, daily weights, salt/fluid restriction, strict Is&Os      Scleroderma (CMS/[facility])  Assessment & Plan  Again, chronic  Tocilizumab every 28 days    Pulmonary hypertension (CMS/[facility])  Assessment & Plan  Chronic, extensive history    Continue home med macitentan  On sildenafil  Ethacrynic acid 50mg resumed 8/17    Essential (primary) hypertension  Assessment & Plan  Pressure stable here, continue home medications         VTE Assessment: Padua    VTE Prophylaxis:  Current anticoagulants:    None      Code Status: Full Code      Estimated Discharge Date: 8/23/2024   Disposition Planning: pending medicare appeal     Maddy Cope MD  8/22/2024

## 2024-08-23 ENCOUNTER — APPOINTMENT (INPATIENT)
Dept: RADIOLOGY | Facility: HOSPITAL | Age: 64
DRG: 515 | End: 2024-08-23
Attending: NURSE PRACTITIONER
Payer: COMMERCIAL

## 2024-08-23 LAB
ANION GAP SERPL CALC-SCNC: 5 MEQ/L (ref 3–15)
BASOPHILS # BLD: 0.02 K/UL (ref 0.01–0.1)
BASOPHILS NFR BLD: 0.2 %
BUN SERPL-MCNC: 34 MG/DL (ref 7–25)
CALCIUM SERPL-MCNC: 8.8 MG/DL (ref 8.6–10.3)
CHLORIDE SERPL-SCNC: 103 MEQ/L (ref 98–107)
CO2 SERPL-SCNC: 28 MEQ/L (ref 21–31)
CREAT SERPL-MCNC: 1.5 MG/DL (ref 0.6–1.2)
DIFFERENTIAL METHOD BLD: ABNORMAL
EGFRCR SERPLBLD CKD-EPI 2021: 39 ML/MIN/1.73M*2
EOSINOPHIL # BLD: 0.32 K/UL (ref 0.04–0.36)
EOSINOPHIL NFR BLD: 3.8 %
ERYTHROCYTE [DISTWIDTH] IN BLOOD BY AUTOMATED COUNT: 20.9 % (ref 11.7–14.4)
GLUCOSE SERPL-MCNC: 83 MG/DL (ref 70–99)
HCT VFR BLD AUTO: 25.4 % (ref 35–45)
HGB BLD-MCNC: 7.4 G/DL (ref 11.8–15.7)
IMM GRANULOCYTES # BLD AUTO: 0.04 K/UL (ref 0–0.08)
IMM GRANULOCYTES NFR BLD AUTO: 0.5 %
LYMPHOCYTES # BLD: 0.94 K/UL (ref 1.2–3.5)
LYMPHOCYTES NFR BLD: 11.1 %
MAGNESIUM SERPL-MCNC: 2 MG/DL (ref 1.8–2.5)
MCH RBC QN AUTO: 24.2 PG (ref 28–33.2)
MCHC RBC AUTO-ENTMCNC: 29.1 G/DL (ref 32.2–35.5)
MCV RBC AUTO: 83 FL (ref 83–98)
MONOCYTES # BLD: 0.59 K/UL (ref 0.28–0.8)
MONOCYTES NFR BLD: 7 %
NEUTROPHILS # BLD: 6.57 K/UL (ref 1.7–7)
NEUTS SEG NFR BLD: 77.4 %
NRBC BLD-RTO: 0 %
PDW BLD AUTO: 10.4 FL (ref 9.4–12.3)
PLATELET # BLD AUTO: 222 K/UL (ref 150–369)
POTASSIUM SERPL-SCNC: 4.6 MEQ/L (ref 3.5–5.1)
RBC # BLD AUTO: 3.06 M/UL (ref 3.93–5.22)
SODIUM SERPL-SCNC: 136 MEQ/L (ref 136–145)
WBC # BLD AUTO: 8.48 K/UL (ref 3.8–10.5)

## 2024-08-23 PROCEDURE — 63600000 HC DRUGS/DETAIL CODE: Mod: JZ | Performed by: NURSE PRACTITIONER

## 2024-08-23 PROCEDURE — 85025 COMPLETE CBC W/AUTO DIFF WBC: CPT | Performed by: STUDENT IN AN ORGANIZED HEALTH CARE EDUCATION/TRAINING PROGRAM

## 2024-08-23 PROCEDURE — 63700000 HC SELF-ADMINISTRABLE DRUG: Performed by: INTERNAL MEDICINE

## 2024-08-23 PROCEDURE — 63600000 HC DRUGS/DETAIL CODE: Mod: JW,JG | Performed by: STUDENT IN AN ORGANIZED HEALTH CARE EDUCATION/TRAINING PROGRAM

## 2024-08-23 PROCEDURE — 21400000 HC ROOM AND CARE CCU/INTERMEDIATE

## 2024-08-23 PROCEDURE — 99233 SBSQ HOSP IP/OBS HIGH 50: CPT | Performed by: INTERNAL MEDICINE

## 2024-08-23 PROCEDURE — 80048 BASIC METABOLIC PNL TOTAL CA: CPT | Performed by: STUDENT IN AN ORGANIZED HEALTH CARE EDUCATION/TRAINING PROGRAM

## 2024-08-23 PROCEDURE — 99233 SBSQ HOSP IP/OBS HIGH 50: CPT | Performed by: NURSE PRACTITIONER

## 2024-08-23 PROCEDURE — 63700000 HC SELF-ADMINISTRABLE DRUG: Performed by: NURSE PRACTITIONER

## 2024-08-23 PROCEDURE — 36415 COLL VENOUS BLD VENIPUNCTURE: CPT | Performed by: STUDENT IN AN ORGANIZED HEALTH CARE EDUCATION/TRAINING PROGRAM

## 2024-08-23 PROCEDURE — 99233 SBSQ HOSP IP/OBS HIGH 50: CPT | Performed by: STUDENT IN AN ORGANIZED HEALTH CARE EDUCATION/TRAINING PROGRAM

## 2024-08-23 PROCEDURE — 63700000 HC SELF-ADMINISTRABLE DRUG: Performed by: STUDENT IN AN ORGANIZED HEALTH CARE EDUCATION/TRAINING PROGRAM

## 2024-08-23 PROCEDURE — 74018 RADEX ABDOMEN 1 VIEW: CPT

## 2024-08-23 PROCEDURE — 83735 ASSAY OF MAGNESIUM: CPT | Performed by: STUDENT IN AN ORGANIZED HEALTH CARE EDUCATION/TRAINING PROGRAM

## 2024-08-23 RX ORDER — ETHACRYNIC ACID 25 MG/1
50 TABLET ORAL ONCE
Status: DISCONTINUED | OUTPATIENT
Start: 2024-08-23 | End: 2024-08-23

## 2024-08-23 RX ORDER — ONDANSETRON HYDROCHLORIDE 2 MG/ML
4 INJECTION, SOLUTION INTRAVENOUS EVERY 6 HOURS
Status: DISCONTINUED | OUTPATIENT
Start: 2024-08-23 | End: 2024-09-02

## 2024-08-23 RX ORDER — POLYETHYLENE GLYCOL 3350 17 G/17G
119 POWDER, FOR SOLUTION ORAL ONCE
Status: COMPLETED | OUTPATIENT
Start: 2024-08-23 | End: 2024-08-23

## 2024-08-23 RX ORDER — METOCLOPRAMIDE HYDROCHLORIDE 5 MG/ML
5 INJECTION INTRAMUSCULAR; INTRAVENOUS EVERY 6 HOURS
Status: DISCONTINUED | OUTPATIENT
Start: 2024-08-23 | End: 2024-08-23

## 2024-08-23 RX ORDER — BISACODYL 10 MG/1
10 SUPPOSITORY RECTAL 3 TIMES DAILY
Status: DISCONTINUED | OUTPATIENT
Start: 2024-08-23 | End: 2024-09-03 | Stop reason: HOSPADM

## 2024-08-23 RX ADMIN — BISACODYL 10 MG: 10 SUPPOSITORY RECTAL at 11:20

## 2024-08-23 RX ADMIN — POLYETHYLENE GLYCOL 3350 119 G: 17 POWDER, FOR SOLUTION ORAL at 15:53

## 2024-08-23 RX ADMIN — GLYCERIN 1 DROP: .002; .002; .01 SOLUTION/ DROPS OPHTHALMIC at 08:26

## 2024-08-23 RX ADMIN — METOCLOPRAMIDE 5 MG: 10 TABLET ORAL at 11:20

## 2024-08-23 RX ADMIN — ONDANSETRON 4 MG: 2 INJECTION INTRAMUSCULAR; INTRAVENOUS at 23:36

## 2024-08-23 RX ADMIN — ACETAMINOPHEN 975 MG: 325 TABLET ORAL at 04:54

## 2024-08-23 RX ADMIN — LIDOCAINE 4% 1 PATCH: 40 PATCH TOPICAL at 08:13

## 2024-08-23 RX ADMIN — WHITE PETROLATUM: 1.75 OINTMENT TOPICAL at 20:12

## 2024-08-23 RX ADMIN — SENNOSIDES 2 TABLET: 8.6 TABLET, FILM COATED ORAL at 08:14

## 2024-08-23 RX ADMIN — GABAPENTIN 200 MG: 100 CAPSULE ORAL at 20:08

## 2024-08-23 RX ADMIN — ONDANSETRON 4 MG: 2 INJECTION INTRAMUSCULAR; INTRAVENOUS at 15:54

## 2024-08-23 RX ADMIN — GABAPENTIN 200 MG: 100 CAPSULE ORAL at 15:54

## 2024-08-23 RX ADMIN — AMLODIPINE BESYLATE 10 MG: 10 TABLET ORAL at 08:14

## 2024-08-23 RX ADMIN — SENNOSIDES 2 TABLET: 8.6 TABLET, FILM COATED ORAL at 20:08

## 2024-08-23 RX ADMIN — HYDROMORPHONE HYDROCHLORIDE 2 MG: 2 TABLET ORAL at 04:54

## 2024-08-23 RX ADMIN — GABAPENTIN 200 MG: 100 CAPSULE ORAL at 08:14

## 2024-08-23 RX ADMIN — MINERAL OIL, WHITE PETROLATUM: .03; .94 OINTMENT OPHTHALMIC at 20:12

## 2024-08-23 RX ADMIN — CYANOCOBALAMIN TAB 1000 MCG 1000 MCG: 1000 TAB at 08:14

## 2024-08-23 RX ADMIN — METOCLOPRAMIDE 5 MG: 10 TABLET ORAL at 04:53

## 2024-08-23 RX ADMIN — SILDENAFIL 20 MG: 20 TABLET, FILM COATED ORAL at 15:54

## 2024-08-23 RX ADMIN — ETHACRYNIC ACID 50 MG: 25 TABLET ORAL at 08:14

## 2024-08-23 RX ADMIN — GLYCERIN 1 DROP: .002; .002; .01 SOLUTION/ DROPS OPHTHALMIC at 21:41

## 2024-08-23 RX ADMIN — ACETAMINOPHEN 975 MG: 325 TABLET ORAL at 15:54

## 2024-08-23 RX ADMIN — METHYLNALTREXONE BROMIDE 4 MG: 12 INJECTION, SOLUTION SUBCUTANEOUS at 18:19

## 2024-08-23 RX ADMIN — DEXTRAN 70, GLYCERIN, HYPROMELLOSE 1 DROP: 1; 2; 3 SOLUTION/ DROPS OPHTHALMIC at 21:42

## 2024-08-23 RX ADMIN — ACETAMINOPHEN 975 MG: 325 TABLET ORAL at 23:36

## 2024-08-23 RX ADMIN — WHITE PETROLATUM: 1.75 OINTMENT TOPICAL at 08:27

## 2024-08-23 RX ADMIN — SILDENAFIL 20 MG: 20 TABLET, FILM COATED ORAL at 20:08

## 2024-08-23 RX ADMIN — BISACODYL 10 MG: 10 SUPPOSITORY RECTAL at 20:09

## 2024-08-23 RX ADMIN — SALINE NASAL SPRAY 2 SPRAY: 1.5 SOLUTION NASAL at 08:27

## 2024-08-23 RX ADMIN — DEXTRAN 70, GLYCERIN, HYPROMELLOSE 1 DROP: 1; 2; 3 SOLUTION/ DROPS OPHTHALMIC at 08:31

## 2024-08-23 RX ADMIN — SILDENAFIL 20 MG: 20 TABLET, FILM COATED ORAL at 08:14

## 2024-08-23 RX ADMIN — POLYETHYLENE GLYCOL 3350 17 G: 17 POWDER, FOR SOLUTION ORAL at 08:13

## 2024-08-23 RX ADMIN — LEVOTHYROXINE SODIUM 100 MCG: 0.1 TABLET ORAL at 04:54

## 2024-08-23 RX ADMIN — CLOPIDOGREL 75 MG: 75 TABLET ORAL at 08:15

## 2024-08-23 RX ADMIN — PANTOPRAZOLE SODIUM 40 MG: 40 TABLET, DELAYED RELEASE ORAL at 08:14

## 2024-08-23 ASSESSMENT — COGNITIVE AND FUNCTIONAL STATUS - GENERAL
MOVING TO AND FROM BED TO CHAIR: 3 - A LITTLE
CLIMB 3 TO 5 STEPS WITH RAILING: 2 - A LOT
WALKING IN HOSPITAL ROOM: 3 - A LITTLE
CLIMB 3 TO 5 STEPS WITH RAILING: 2 - A LOT
STANDING UP FROM CHAIR USING ARMS: 3 - A LITTLE
MOVING TO AND FROM BED TO CHAIR: 3 - A LITTLE
WALKING IN HOSPITAL ROOM: 3 - A LITTLE
STANDING UP FROM CHAIR USING ARMS: 3 - A LITTLE

## 2024-08-23 NOTE — PROGRESS NOTES
Patient: Arielle Hammondstower  Location: [place] [address]  MRN:  [id number]  Today's date:  8/23/2024    Attempted to see patient for therapy. Unable due to patient refused.Pt continues to refuse OT despite education or benefits of working with therapy, D/C OT orders at this time.

## 2024-08-23 NOTE — PROGRESS NOTES
Cardiology/pulmonary hypertension service progress note    Interval History:      Awake in bed, Reports pain wise she is doing better as far as her chest is concerned.  Foot and groin pain un-phased.  She now also has constipation.   Continued nausea/vomiting although she states she is eating and drinking normally.  Standing weight obtained yesterday,  up to 119 lbs (dry is 109 lbs).        Background per Dr. Arvizu:    Ms. Felix is a 63 y.o. female with a past medical history of Pulmonary HTN, HTN, Raynaud's Disease, Cutaneous Systemic Scleroderma (dx 1998), ILD, PE (3/2023). She is a patient of Dr. Nguyễn. She was previously followed by Dr. Jos Valdez at [facility]. Echocardiogram from 4/21/2022 showed LVEF: 55-60%, mild aortic valve thickening, pulmonary artery systolic pressure: 52 mmHg and trivial pericardial effusion. LHC and RHC (separate occasions) over the last 5-10 years which showed normal hemodynamics and normal coronaries. She had a RHC 9/02/2020 showing top-normal right sided filling pressures with mild pulmonary hypertension, top-normal PVR and normal pulmonary capillary wedge pressure. The cardiac index was normal, seen below.     On 6/27/2022, she was in [facility] for chest pain. EKG: NSR without ischemic changes. hsTrop: 12->16, d-dimer: 0.56, CXR showed cardiomegaly and diffuse interstitial prominence.     Dr. Nguyễn ordered echocardiogram, which was completed on 8/22/2022 and showed estimated RVSP = 50-55 mmHg, normal-sized LV with normal LV systolic function. Estimated EF 55- 60%. Wall motion grossly normal, mild concentric left ventricular hypertrophy, grade I LV diastolic dysfunction, probably normal RV size and function.    At her initial visit on 10/11/2022, she reported that she felt very short of breath with minimal activity most of the time. She reports that this started about 1 year prior and had progressively gotten worse. She described PND and bendopnea. She reported that she  experienced ankle swelling, worsened over the past year and usually worse towards the end of the night. She also reported some swelling in her abdomen. She reported daily palpations. I recommended a RHC which was done 11/28/2022 and showed: Normal right sided filling pressures. Mildly elevated left sided filling pressures. Precapillary pulmonary hypertension with mean PA 36 mmHg.    She was hospitalized on 3/19/2023 at [facility] for PE diagnosed on V/Q scan. She reports that she had presented with left arm pain and heaviness. She states that she was started on Apixaban. One week after her last office visit (4/6/2023), [place] [facility] [place], epistaxis and hemoptysis. Echocardiogram showed: left ventricular cavity size normal with mild left ventricular hypertrophy and preserved systolic function. Estimated EF 65%. Chest CTA showed no evidence of PE; Apixaban was discontinued.     She had a repeat echocardiogram (6/2023) which showed: Normal left ventricular cavity size and mild concentric left ventricular hypertrophy. Normal systolic function. EF: 60%. Normal right ventricular structure and function. Mild tricuspid valve regurgitation with estimated RVSP: 55 mmHg. Compared to previous study from 4/14/2023, estimated right ventricular systolic pressure were higher.    She underwent V/Q scan in September 2023, which showed intermediate-to-high probability of pulmonary embolic disease, as a result she completed CTA Chest PE which showed: no evidence for filling defect or vessel cutoff to suggest pulmonary embolism. Enlargement of the pulmonary arteries compatible with pulmonary arterial hypertension was seen.    She reports she started Macitentan around August or September 2023. She stated that she had not been checking her SpO2. She reports she did not notice any difference since starting it. She reported she had been feeling more shortness of breath at times with ADLs.     She [place]  [place] [facility] [place] with chest pressure, palpitations and nausea. She was noted to have hypoxia and tachypnea at presentation. She was found to have moderate-to-large pericardial effusion without tamponade. She was started on Colchicine 0.6 mg BID on 3/4/2024 and when she did not improve symptomatically, Prednisone 20 mg daily was added on 3/5/2024. On 3/7/2024, she developed multiple episodes of diarrhea and Colchicine dose was decreased to 0.3 mg. Coronary CTA, done as part of the ischemic workup, showed moderate 2 vessel CAD. She was recommended to begin Atorvastatin 20 mg daily and Clopidogrel 75 mg daily. She initially refused those two medications, but eventually agreed to take Atorvastatin.      She was discharged on Colchicine 0.3 mg for at least 3 months and Prednisone 20 mg for a total two week course until 3/19/2024, then decrease the dose to 10 mg daily for 2 weeks until 4/2/2024, then decrease dose to 5 mg daily for 2 weeks until 4/16/2024. She reports she tapered off Prednisone as instructed and has continued to take Colchicine as prescribed. She completed an echocardiogram (4/10/2024) which showed: Small-to-moderate sized, circumferential pericardial effusion. No echocardiographic evidence for cardiac tamponade. Compared to previous study from 3/4/2024, no significant change. Pericardial effusion size was similar, estimated right atrial pressure lower.      She [place] [facility] [place] with increased pleuritic chest pain. On presentation to the ER, she was noted to be hypertensive and tachycardic. Labs showed mildly elevated troponin, elevated BNP and leukocytosis. CT angiography of the chest did not show evidence of PE. It was read as large pericardial effusion, evidence of emphysema, evidence of pulmonary hypertension, 16 mm left lobe lung mass.       She presented again to [facility] [place] 5/21/2024 at the request of her Rheumatologist. During her visit there, she was noted to be tachycardic and with some  "shortness of breath. Her chest pain was improved from her prior admission. On arrival, O2 saturations were normal. ECG showed: Sinus Tachycardia (HR: 125 bpm). BNP >300, hsTrop 32 ->24. POCUS in the emergency department showed evidence of pericardial effusion with no tamponade physiology. CTA Chest did not show evidence of pulmonary embolism. There were bilateral changes radiographically concerning for volume overload. She received Methylpredinsolone 125 mg in ED. Of note, she was on Prednisone 10 mg daily at home. She was given IV diuresis. She was discharged on Furosemide 20 mg PO PRN daily for fluid weight gain / swelling.     She had brief hospitalization and was discharged on 7/23 for chest pain. She presented to the ED on 7/25/2024 for worsening of her chronic chest pain and 8/5/2024 for facial swelling and chronic chest pain.      She underwent bronchoscopic lung biopsy yesterday, 8/8/2024, with Dr. Niles Rodriguez. Pathology showed atypical glandular cells which may be consistent with adenocarcinoma in situ.     Post-procedure CXR showed increase pulmonary vascular congestion. She was admitted for optimization.       ---    Rheumatology: Dr. Trevizo  Pulmonology: Dr. Wasserman    Past Medical / Surgical History:  Pulmonary HTN, HTN, Raynaud's Disease, Cutaneous Systemic Scleroderma (dx 1998), ILD, PE (3/2023), Follicular Carcinoma of Thyroid, Tenosynovitis, de Quervain, h/o Hernia Repair, h/o Appendicitis, h/o Digit Amputation, H/o Total Thyroidectomy (Dr. Pacheco at [facility]; 4/2013)    Family & Social History: She is , she has two children. She has worked as an .     Tobacco: former 2005  EtOH: never   Illicits: never     Her brother has CAD with stent  1st cousin with SLE  Both parents had \"heart problems\"    Allergies:   Allergies   Allergen Reactions    Aspirin Shortness of breath     Other reaction(s): Unknown  \"stop breathing\"      Ibuprofen Shortness of breath     Stop breathing    Iodine " Shortness of breath     Other reaction(s): Unknown    Iodine And Iodide Containing Products Shortness of breath     ? rash    Morphine Shortness of breath      Reported wamrth of face, improved with benadryl and cold compresses after getting morphine as well as episode of (subjective ) dyspnea, and thought she passed out - did have wamrth and erythema of face with mild edema R>L cheek notably on exam.     Penicillins Shortness of breath     Other reaction(s): Unknown    Sulfa (Sulfonamide Antibiotics) Angioedema    Clindamycin     Hydrochlorothiazide      Other reaction(s): Abdominal Pain   Slow heart rate    Oxycodone      Other reaction(s): Vomiting    Sulfamethoxazole-Trimethoprim      Other reaction(s): Vomiting, Weakness     Latex Rash       Medications:   Current Facility-Administered Medications   Medication Dose Route Frequency Provider Last Rate Last Admin    acetaminophen (TYLENOL) tablet 975 mg  975 mg oral q6h SPENCER Simon Cano, DO   975 mg at 08/23/24 0454    amLODIPine (NORVASC) tablet 10 mg  10 mg oral q AM Simon Cano DO   10 mg at 08/23/24 0814    artificial tears (dextran-hpm-glyc) (GENTEAL TEARS) 0.1-0.3-0.2 % eye drops 1 drop  1 drop Both Eyes QID Simon Cano DO   1 drop at 08/23/24 0831    atorvastatin (LIPITOR) tablet 40 mg  40 mg oral Daily (6p) Simon Cano DO   40 mg at 08/22/24 1733    benzocaine-menthol (CEPACOL/CHLORASEPTIC) lozenge 1 lozenge  1 lozenge Mouth/Throat q2h PRN Simon Cano DO   1 lozenge at 08/22/24 0841    benzonatate (TESSALON) capsule 100 mg  100 mg oral 3x daily PRN Simon Cano DO   100 mg at 08/22/24 0841    bisacodyL (DULCOLAX) 10 mg suppository 10 mg  10 mg rectal Daily PRN Maddy Cope MD   10 mg at 08/22/24 1216    carboxymethylcellulose (REFRESH PLUS) 0.5 % ophthalmic dropperette 1 drop  1 drop Right Eye 3x daily PRN Simon Cano,    1 drop at 08/20/24 0817     clopidogreL (PLAVIX) tablet 75 mg  75 mg oral Daily Simon Cano DO   75 mg at 08/23/24 0815    cyanocobalamin (VITAMIN B12) tablet 1,000 mcg  1,000 mcg oral Daily Simon Cano DO   1,000 mcg at 08/23/24 0814    cyclobenzaprine (FLEXERIL) tablet 5 mg  5 mg oral 3x daily PRN Simon Cano DO   5 mg at 08/22/24 1404    glucose chewable tablet 16-32 g of dextrose  16-32 g of dextrose oral PRN Simon Cano DO        Or    dextrose 40 % oral gel 15-30 g of dextrose  15-30 g of dextrose oral PRN Simon Cano DO        Or    glucagon (GLUCAGEN) injection 1 mg  1 mg intramuscular PRN Simon Cano DO        Or    dextrose 50 % in water (D50) injection 12.5 g  25 mL intravenous PRN Simon Cano DO        diphenhydrAMINE (BENADRYL) capsule 50 mg  50 mg oral q8h PRN Simon Cano DO        ethacrynic acid (EDECRIN) tablet 50 mg  50 mg oral Daily Simon Cano DO   50 mg at 08/23/24 0814    famotidine (PEPCID) injection 20 mg  20 mg intravenous Daily PRN Molly Hidalgo CRNP        Or    famotidine (PEPCID) tablet 20 mg  20 mg oral Daily PRN Molly Hidalgo CRNP        gabapentin (NEURONTIN) capsule 200 mg  200 mg oral TID Maddy Cope MD   200 mg at 08/23/24 0814    honey (MEDIHONEY) 100 % topical paste   Topical Daily PRN Simon Cano DO   Given at 08/19/24 0830    HYDROmorphone (DILAUDID) tablet 2 mg  2 mg oral q4h PRN Molly Hidalgo CRNP   2 mg at 08/23/24 0454    levothyroxine (SYNTHROID) tablet 100 mcg  100 mcg oral Daily (6:30a) Simon Cano DO   100 mcg at 08/23/24 0454    lidocaine (ASPERCREME) 4 % topical patch 1 patch  1 patch Topical Daily Simon Cano DO   1 patch at 08/23/24 0813    lidocaine (ASPERCREME) 4 % topical patch 1 patch  1 patch Topical Daily Simon Cano DO   1 patch at 08/23/24 0813    lidocaine (ASPERCREME) 4 %  topical patch 1 patch  1 patch Topical Daily Simon Cano DO   1 patch at 08/23/24 0813    macitentan (OPSUMIT) tablet 10 mg (Patient Owned)  10 mg oral Daily Simon Cano DO   10 mg at 08/23/24 0815    melatonin ODT 3 mg  3 mg oral Nightly PRN Simon Cano DO   3 mg at 08/21/24 2119    metoclopramide (REGLAN) tablet 5 mg  5 mg oral q6h PRN Molly Hidalgo CRNP   5 mg at 08/23/24 0453    ondansetron ODT (ZOFRAN-ODT) disintegrating tablet 4 mg  4 mg oral q6h PRN Molly Hidalgo CRNP   4 mg at 08/22/24 1216    Or    ondansetron (ZOFRAN) injection 4 mg  4 mg intravenous q6h PRN Molly Hidalgo CRNP   4 mg at 08/21/24 2118    pantoprazole (PROTONIX) tablet,delayed release (DR/EC) 40 mg  40 mg oral Daily Molly Hidalgo CRNP   40 mg at 08/23/24 0814    peg 400-hypromellose-glycerin (ARTIFICIAL TEARS) 1-0.2-0.2 % eye drops 1 drop  1 drop Both Eyes q4h while awake Simon Cano DO   1 drop at 08/23/24 0826    phenoL (SORE THROAT SPRAY) 1.4 % mucosal spray 1 spray  1 spray Mouth/Throat q2h PRN Simon Cano DO        polyethylene glycol (MIRALAX) 17 gram packet 17 g  17 g oral BID Maddy Cope MD   17 g at 08/23/24 0813    senna (SENOKOT) tablet 2 tablet  2 tablet oral BID Maddy Cope MD   2 tablet at 08/23/24 0814    sildenafiL (pulm.hypertension) (REVATIO) tablet 20 mg  20 mg oral TID Simon Cano DO   20 mg at 08/23/24 0814    sodium chloride (OCEAN) 0.65 % nasal spray 2 spray  2 spray Each Nostril Daily Simon Cano DO   2 spray at 08/23/24 0827    white petrolatum (AQUAPHOR) ointment   Topical q4h PRN Simon Cano DO   Given at 08/13/24 2117    white petrolatum (AQUAPHOR) ointment   Topical BID Simon Cano DO   Given at 08/23/24 0827    white petrolatum-mineral oil (ARTIFICIAL TEARS OINT) ophthalmic ointment   Both Eyes Nightly Simon Cano DO   Given at 08/21/24 2108        Review of Systems:   All other systems reviewed and are negative except as per HPI.    Physical Exam:     Wt Readings from Last 10 Encounters:   08/23/24 55.2 kg (121 lb 11.2 oz)   08/05/24 49.4 kg (109 lb)   08/05/24 49.4 kg (109 lb)   07/20/24 49.5 kg (109 lb 2 oz)   07/19/24 51.3 kg (113 lb)   06/20/24 51.7 kg (114 lb)   06/14/24 52.6 kg (116 lb)   05/23/24 52.4 kg (115 lb 9.6 oz)   04/17/24 53.5 kg (118 lb)   04/10/24 44.5 kg (98 lb)       BP Readings from Last 5 Encounters:   08/23/24 125/61   08/05/24 (!) 148/75   07/25/24 (!) 155/77   07/23/24 (!) 101/57   07/19/24 (!) 154/80       Vitals:    08/23/24 0749   BP:    Pulse: 75   Resp:    Temp:    SpO2:        Constitutional: NAD, AAOx3  HENT: Normocephalic, atraumatic head, sclerae anicteric, no cervical lymphadenopathy, trachea midline, facial swelling  Cardiovascular: RRR, no murmurs, rubs or gallops, JVP: 10 cm H2O   cm H2O, no carotid bruits.  Respiratory: CTA bilateral lung fields, no wheezes, rales or rhonchi  GI: soft, non-tender/non-distended  Musculoskeletal / Extremities: Bilateral hip edema noted, +2 pulses bilateral radial, DP/PT arteries, skin tightening on face and fingertips, ulceration and discoloration of 2nd phalange b/l LE.   Skin: no rashes. Facial changes c/w scleroderma  Neuro / Psych: no focal deficits, CNII-XII grossly intact, appropriate, cooperative    Diagnostic Data:     Component      Latest Ref Rng 7/20/2024 7/25/2024   Sodium      136 - 145 mEQ/L 142  139    Potassium, Bld      3.5 - 5.1 mEQ/L 4.2  4.3    Chloride      98 - 107 mEQ/L 107  103    CO2      21 - 31 mEQ/L 25  27    BUN      7 - 25 mg/dL 17  17    Creatinine      0.6 - 1.2 mg/dL 1.2  1.1    Glucose      70 - 99 mg/dL 85  86    Calcium      8.6 - 10.3 mg/dL 9.3  10.6 (H)    AST (SGOT)      13 - 39 IU/L  15    ALT (SGPT)      7 - 52 IU/L  8    Alkaline Phosphatase      34 - 125 IU/L  82    Total Protein      6.0 - 8.2 g/dL  8.8 (H)    Albumin      3.5 - 5.7 g/dL   4.3    Bilirubin, Total      0.3 - 1.2 mg/dL  0.4    eGFR      >=60.0 mL/min/1.73m*2 51.0 (L)  56.6 (L)    Anion Gap      3 - 15 mEQ/L 10  9       Component      Latest Ref [facility] 7/20/2024 7/25/2024   WBC      3.80 - 10.50 K/uL 7.27  10.29    Hemoglobin      11.8 - 15.7 g/dL 9.8 (L)  11.2 (L)    Hematocrit      35.0 - 45.0 % 32.0 (L)  37.3    MCV      83.0 - 98.0 fL 80.4 (L)  80.4 (L)    Platelets      150 - 369 K/uL 206  331       Component      Latest Ref [facility] 7/19/2024 7/25/2024 8/9/2024   High Sens Troponin I      <15.0 pg/mL 89.2 (HH)  66.8 (HH)  92.5 (HH)    High Sens Troponin I       90.3 (HH)  83.4 (HH)         Component      Latest Ref [facility] 5/22/2024 6/22/2024 7/19/2024 7/25/2024   BNP      <=100 pg/mL 352 (H)  150 (H)  151 (H)  220 (H)       Component      Latest Ref [facility] 4/16/2024 5/23/2024 7/21/2024 7/22/2024   Sed Rate      0 - 30 mm/hr 101 (H)  79 (H)  105 (H)  >119 (H)       Component      Latest Ref [facility] 4/18/2024 5/24/2024   WBC      3.80 - 10.50 K/uL 8.40  16.22 (H)    RBC      3.93 - 5.22 M/uL 3.54 (L)  3.82 (L)    Hemoglobin      11.8 - 15.7 g/dL 9.8 (L)  10.3 (L)    Hematocrit      35.0 - 45.0 % 32.8 (L)  33.9 (L)    Platelets      150 - 369 K/uL 254  289       Component      Latest Ref [facility] 5/24/2024   Magnesium      1.8 - 2.5 mg/dL 2.1      Component      Latest Ref [facility] 4/13/2023 3/7/2024 5/22/2024   TSH      0.34 - 5.60 mIU/L 4.71  0.28 (L)  1.03      Component      Latest Ref Rng 5/24/2024   Sodium      136 - 145 mEQ/L 138    Potassium, Bld      3.5 - 5.1 mEQ/L 4.4    Chloride      98 - 107 mEQ/L 104    CO2      21 - 31 mEQ/L 24    BUN      7 - 25 mg/dL 40 (H)    Creatinine      0.6 - 1.2 mg/dL 1.1    Glucose      70 - 99 mg/dL 87    Calcium      8.6 - 10.3 mg/dL 9.5    eGFR      >=60.0 mL/min/1.73m*2 56.6 (L)    Anion Gap      3 - 15 mEQ/L 10      Component      Latest Ref Rng 4/13/2023   Hemoglobin A1C      <5.7 % 4.4        Component      Latest Ref Rng 4/14/2023   Triglycerides      30 - 149  mg/dL 44    Cholesterol      <=200 mg/dL 194    HDL      >=55 mg/dL 49 (L)    LDL Calculated      <=100 mg/dL 136 (H)    Non-HDL, Calculated      mg/dL 145    RISK      <=5.0  4.0       Component      Latest Ref Rng 4/13/2023   High Sens Troponin I      <15.0 pg/mL 23.0 (H)       Component      Latest Ref Rng 4/14/2023   Ferritin      11 - 250 ng/mL 75      Component      Latest Ref Rng 4/14/2023   Iron      35 - 150 ug/dL 29 (L)    TIBC      270 - 460 ug/dL 245 (L)    UIBC      180 - 360 ug/dL 216    Iron Saturation      15 - 45 % 12 (L)        Component      Latest Ref Rng 6/27/2022 4/13/2023   BNP      <=100 pg/mL 111 (H)  105 (H)                          ---    ECG (8/2/2024, personally reviewed):   Sinus tachycardia   Left ventricular hypertrophy with repolarization abnormality    HR: 117 bpm     ---        Lower extremity arterial duplex and ENRRIQUE August 10, 2024:  Right ankle ENRRIQUE (DP): 0.74.  Right ankle ENRRIQUE (PT): 0.88.  Left ankle ENRRIQUE (DP): 0.90.  Left ankle ENRRIQUE (PT): 0.62.    Findings concerning for hemodynamically significant stenosis within the  bilateral popliteal arteries and distal left femoral artery. There are also  findings which can be seen with inflow stenosis at the level left common femoral  artery. CTA of the abdomen and pelvis with lower extremity runoff could be  performed for further evaluation if clinically indicated.    CT Chest / Abdomen / Pelvis w/ Contrast (7/25/2024, personally reviewed):     Lung bases: Left-sided pleural effusion, left parenchymal consolidation, and  findings of interstitial lung disease.  More nodular density in the medial  aspect of the left lower lobe measuring 1.6 x 1.9 cm.  Cardiomegaly with a  pericardial effusion.     Liver: The liver is normal in size.  There is no focal mass.  Hepatic veins and  portal veins are patent without focal filling defects to suggest thrombosis..     Gallbladder:  Small dependent gallstones.  No gallbladder wall thickening..      Spleen: Spleen is normal in size without focal mass lesion..     Pancreas: The pancreas is normal without focal mass, parenchymal atrophy, or  pancreatic duct dilation..     Adrenals: The adrenals are normal bilaterally without focal mass..     Kidneys, ureters and bladder:  Symmetric enhancement and excretion..  Left lower  pole cystic lesion measures 3.2 cm in maximal dimension.  This measures greater  than simple fluid density.  This measured 60 Hounsfield units on the noncontrast  CT from 2020.  It measures 36 Hounsfield units on today's study.  Therefore it  is most in keeping with a hemorrhagic/proteinaceous cyst.  Additional  hypodensities too small to characterize.     Retroperitoneal structures: There is no abdominal aortic aneurysm.  Atherosclerotic calcification.  There is no retroperitoneal adenopathy or  retroperitoneal mass..     Bowel and mesentery: No evidence of a focal inflammatory or obstructive process.  Moderate fecal retention throughout the colon.  There are a few fluid-filled  small bowel loops in the pelvis, nonspecific.     Lymph nodes: No pathologic lymphadenopathy..     Pelvis: The uterus is normal in size.  The ovaries are normal.  There is no  adnexal mass or pelvic free fluid.     Bones: Within normal limits.    No evidence for pulmonary embolism.  Persistent left-sided pleural effusion and airspace disease at the left lung  base.  Underlying interstitial lung disease.     TTE (7/19/2024, personally reviewed):   Mild increased wall thickness with normal left ventricular cavity and preserved systolic function. EF 65%. No wall motion abnormalities.   Normal right ventricular size with preserved systolic function.   IVC normal in size with >50% respiratory variation consistent with normal right sided filling pressures.   Small pericardial effusion. No evidence of hemodynamic compromise with normal respiratory variation and no chamber collapse. Prominent epicardial fat.   Compared with  previous study of 5/23/2024, small pericardial effusion persists.      PET/CT Skull-to-Thigh (6/4/2024):   An approximately 1.6 cm pulmonary nodule at the medial left lung base is  significantly FDG avid at max SUV 8.5.  This is nonspecific and can be seen in  the setting of benign pathology however malignancy is a concern.     There is more mild to moderate nonspecific uptake localizing to lita-fissural  nodules particularly on the left.     There is  moderate nonspecific left hilar and mediastinal dawson uptake.     No additional suspicious FDG avid findings appreciated.     CTA Chest (5/21/2024):   IMPRESSION:  1.  No evidence of acute pulmonary embolism.  2.  Multiple new perifissural left lower lobe nodules, suspicious for a  neoplastic process. PET/CT and or tissue sampling is recommended.  3.  Chronic interstitial lung disease in an NSIP pattern, with overall slightly  progressed appearance since March 2023. Pulmonary consultation is recommended.  4.  Stable cardiomegaly and large pericardial effusion.  5.  Mediastinal and supraclavicular adenopathy, similar to prior study, also  indeterminate, which could be further evaluated at time of PET/CT.     CTA Chest (4/16/2024):   IMPRESSION:  1. No evidence of pulmonary embolism.  2. Stable cardiomegaly and large pericardial effusion. Cardiology consultation  suggested.  3. Emphysema with bilateral lower lobe groundglass opacity and bronchiectasis in  an NSIP pattern, which can be seen with scleroderma. Pulmonary consultation  suggested.  4. Stable 16 mm left lower lobe masslike density which may represent  atelectasis, lung cancer or lymphoma. When clinically appropriate PET/CT  suggested.  5. Stable prominence of the main pulmonary artery which can be seen with  pulmonary hypertension     TTE (5/23/2024, personally reviewed):  Mild increased wall thickness with normal left ventricular cavity and preserved systolic function. EF 65%. No wall motion abnormalities.    Normal right ventricular size with preserved systolic function.   Tiny IVC with >50% respiratory variation consistent with low-normal right sided filling pressures.   Small pericardial effusion, predominantly adjacent to right atrium. No evidence of hemodynamic compromise with normal respiratory variation and no chamber collapse. Prominent epicardial fat.   Compared with previous study of 4/10/24, small pericardial effusion persists.      TTE (4/10/2024, personally reviewed):  1. Normal left ventricular cavity size with mild concentric left ventricular hypertrophy. Normal systolic function. EF: 60%. No regional wall motion abnormalities. Cannot determine diastolic function. Global longitudinal strain not reported due to technical limitations of study.   2. Aortic valve has three cusps. Trace aortic regurgitation. Aortic valve and root sclerosis.  3. Thickened mitral valve leaflets. Trace mitral valve regurgitation. Normal left atrial size.   4. Normal right ventricular structure and function. Trace tricuspid valve regurgitation with estimated RVSP: 51 mmHg (assuming right atrial pressure of 3 mmHg). Normal right atrial size. Pulmonic valve structurally normal. Trace pulmonic valve regurgitation. Pulsed wave Doppler of the RVOT systolic profile shows decreased acceleration times and geometry consistent with mildly elevated PVR.  5. IVC size is normal with > 50% inspiratory collapse consistent with normal right atrial pressure. Small-to-moderate sized, circumferential pericardial effusion. No echocardiographic evidence for cardiac tamponade.   6. Compared to previous study from 3/4/2024, no significant change. Pericardial effusion size is similar, estimated right atrial pressure lower.           CTA Abd / Pelvis (3/13/2024, personally reviewed):   There is dilatation of the of proximal/mid small bowel with relatively abrupt  transition left hemiabdomen, likely obstruction. Questionable lesion at the  transition  point. This could be better evaluated with CT or MRI enterography.     Aorta and major branches patent. Superior mesenteric artery patent without  significant stenosis.  Suspect moderate stenosis of the origins of the of renal arteries bilaterally.     Large pericardial effusion, no change.     Patchy opacities lower lungs, similar to prior.      Coronary CTA (3/11/2024):   1. Two-vessel coronary artery disease as above that is moderate in severity.  CT  FFR is normal..  2. There is mitral annular calcification and aortic sclerosis.  The right atrium  is enlarged.  There is left ventricular hypertrophy.  There is a moderate to  large circumferential pericardial effusion.  3. Normal left ventricular wall motion and systolic function.  4. Coronary calcium score 313, 96th percentile     TTE (3/4/2024, personally reviewed):   Normal-sized left ventricle. Mild left ventricular hypertrophy. Preserved systolic function. LVEF 60%. No regional wall motion abnormalities. Grade II diastolic dysfunction.  Normal global longitudinal strain (-20%).  The aortic valve is not well seen.  Cannot determine the number of cusps.  Sclerotic leaflets.  No aortic stenosis.  Trace aortic insufficiency.  Normal sized aortic root.  The visualized portion of the ascending aorta is normal in size.  The mitral valve leaflets are thickened. Mild mitral annular calcification. Trace mitral regurgitation.   Mildly dilated left atrium.  Normal-sized right ventricle. Normal right ventricular systolic function.  Thickened tricuspid valve. There is mild tricuspid regurgitation with estimated RVSP of 46 mmHg.   Pulmonic valve is not well visualized. Trace pulmonic regurgitation.   Mildly dilated right atrium.  IVC is enlarged with <50% respiratory variation consistent with elevated right atrial filling pressure (at least 15 mmHg).   Moderate, circumferential, circumferential pericardial effusion.  The largest pocket is located inferolaterally.  Elevated  right atrial pressure.  No other clear echocardiographic features of increased pericardial pressure.  Correlate clinically.   Compared to previous TTE from 6/21/2023, pericardial effusion now moderate in size.  Right atrial pressure now elevated.         CTA Chest (3/3/2024):   IMPRESSION:  1.  No evidence of acute pulmonary embolism to the level of the segmental  branches.  2.  Moderate to large pericardial effusion measuring higher than simple fluid  density.  3.  Lower lobe lung field predominant interstitial thickening with some relative  subpleural sparing, consistent with a chronic interstitial lung disease,  unchanged from the prior study.  4.  Continued bilateral axillary, mediastinal, and bilateral hilar  lymphadenopathy, not definitely changed from the prior study, possibly related  to the patient's sarcoid.  5.  Emphysema.      CTA Chest PE (10/4/2023):  IMPRESSION:  1. No evidence for filling defect or vessel cutoff to suggest pulmonary  embolism.  Enlargement of the pulmonary arteries compatible with pulmonary  arterial hypertension.  2. Changes throughout the lungs as described above which can be seen with  systemic sclerosis/scleroderma.  Underlying pneumonia the lung bases cannot be  entirely excluded in the proper clinical setting.  3.  Additional stable findings as described above.        VQ (9/12/2023):  IMPRESSION:  Intermediate to high probability of pulmonary embolic disease.     6MWT (7/25/2023, on Sildenafil 20 mg TID):        TTE (6/21/2023, personally reviewed):  1. Normal left ventricular cavity size and mild concentric left ventricular hypertrophy. Normal systolic function. EF: 60%. No regional wall motion abnormalities. Grade I diastolic dysfunction.   2. Aortic valve cusps not well visualized. Trace aortic regurgitation. Aortic valve and root sclerosis.  3. Thickened mitral valve leaflets. Mild mitral annular calcification. Trace mitral regurgitation. Mildly dilated left atrial size.    4. Normal right ventricular structure and function. Mild tricuspid valve regurgitation with estimated RVSP: 55 mmHg (assuming right atrial pressure of 3 mmHg). Normal right atrial size. Pulmonic valve not well visualized. Trace pulmonic valve regurgitation. Pulsed wave Doppler of the RVOT systolic profile show decreased acceleration times ( msec) and late systolic notching consistent with elevated PVR.   5. IVC size is normal with > 50% inspiratory collapse consistent with normal right atrial pressure. Small pericardial effusion without echocardiographic evidence of tamponade.  6. Compared to previous study from 4/14/2023, estimated right ventricular systolic pressure is higher.        TTE (4/14/2023, personally reviewed):   The left ventricular cavity size is normal with mild left ventricular hypertrophy and preserved systolic function. Estimated EF 65%. No regional wall motion abnormalities. Grade I diastolic dysfunction.     The aortic valve is tricuspid. Aortic valve sclerosis. Trace aortic regurgitation.      The visualized portions of the aortic root and ascending aorta are normal in size.     The mitral valve is mildly thickened. Mild mitral annular calcification. Trace mitral regurgitation.       The left atrium is severely dilated.      The right ventricle is normal in size with preserved systolic function     The pulmonic valve is not well seen.     The tricuspid valve is normal in appearance. mild tricuspid regurgitation with an estimated RVSP of 34 mmHg.       Right atrium is normal in size.     The IVC is small and collapses > 50% with inspiration consistent with normal right atrial pressures.     Small pericardial effusion, mostly posterior.       Compared to previous echocardiogram from 8/22/2022, there is no significant change        TTE (11/28/2022, personally reviewed):   Normal right- and mildly elevated left-sided filling pressures (RA: 3 mmHg, PCWP: 13 mmHg)  Elevated pulmonary artery  pressures (60/22(35 mmHg)) in the setting of PCWP: 13 mmHg.   Normal cardiac output and index (CO: 5.1 L/min, CI: 3.2 L/min/m2)  PVR: 4.3 Wood units. SVR: 1840 dynes/sec/cm5      TTE (8/22/2022, personally reviewed):  Normal-sized LV. Normal LV systolic function. Estimated EF 55- 60%. Wall motion appears grossly normal. Mild concentric left ventricular hypertrophy. Grade I LV diastolic dysfunction.  Pulmonic valve not well visualized. Grossly normal pulmonic valve structure. Trace pulmonic valve regurgitation. No pulmonic valve stenosis.  Aortic valve not well visualized. Aortic valve not well seen but likely trileaflet. Focal aortic valve calcification present. Sclerotic aortic valve leaflets. Mild aortic valve regurgitation. No aortic valve stenosis.  RV not well visualized. Normal-sized RV. Normal RV systolic function.  Normal-sized RA.  There is a small loculated pericardial effusion anterior to the right atrium. No cardiac tamponade.  Sclerotic mitral valve. Mitral valve calcification of the anterior leaflet present.  Trace mitral valve regurgitation.  No significant mitral valve stenosis.  Normal-sized IVC. IVC demonstrates normal respiratory collapse.  Tricuspid valve structure is grossly normal. Mild to moderate tricuspid valve regurgitation.  Estimated RVSP = 50-55 mmHg.  Mildly dilated LA.  No previous echocardiogram available for comparison.     TTE (4/21/2022, outside study):  This result has an attachment that is not available.     A complete transthoracic echocardiogram (including 2D, color flow   Doppler, spectral Doppler and M-mode imaging) was performed using the   standard protocol. The study quality was adequate.     The left ventricle is normal in size. There is moderately increased   wall thickness consistent with moderate concentric hypertrophy.   Endocardium is incompletely visualized, but no regional wall motion   abnormalities are noted in the visualized segments. Normal left   ventricular  ejection fraction. The left ventricular ejection fraction by   visual estimate is 55% to 60%.     The right ventricle is normal in size. There is normal function of the   right ventricle.     The left atrium is normal in size. Left atrial volume is 58 mL.     The right atrial volume measures 52 mL. The right atrial volume index   measures 34 mL/m2.     There is trace mitral regurgitation. There is no mitral stenosis.     The aortic valve is trileaflet. There is mild aortic valve thickening.   There is no aortic stenosis. There is trace aortic regurgitation.     There is mild to moderate tricuspid regurgitation. Pulmonary artery   systolic pressure measures 52 mmHg. The pulmonary artery systolic pressure   is moderately elevated.     The aorta measures 3.2 cm at the sinus of Valsalva. The proximal   segment of the ascending aorta is mildly enlarged. The proximal segment of   the ascending aorta measures 3.4 cm. The proximal segment of the ascending   aorta measures 2.2 cm/m2, indexed for body surface area.     Trivial pericardial effusion present. There is no echocardiographic   evidence of cardiac tamponade.     The inferior vena cava is normal in size (diameter <21 mm) and   decreases >50% in size with inspiration, suggesting a normal right atrial   pressure of 3 mmHg (range 0-5 mm Hg).     Compared to the prior study of 3/13/2020, there are no significant   changes.        PFT (3/23/2022):      PFT (7/14/2021):      CT Chest (5/2/2021, outside study):  CLINICAL INFORMATION: Systemic sclerosis. Interstitial lung disease. Groundglass nodule     PROCEDURE: Unenhanced CT of the chest was performed using thin section reconstructions. Images were obtained on inspiration and expiration.     COMPARISON: June and August 2020     FINDINGS:     LUNGS, PLEURA:     Groundglass opacities with reticulation with subpleural sparing in the lower lobes, without honeycombing or architectural distortion, unchanged.     Right upper  lobe groundglass nodule, image 114 of series 3, 8 x 11 mm, previously 6 mm.     Expiratory images are of diagnostic quality and do not demonstrate evidence of clinically significant air trapping.     CARDIOVASCULAR, MEDIASTINUM, THYROID: Coronary calcifications. Trace pericardial effusion.     LYMPH NODES: Subcentimeter mediastinal lymph nodes, unchanged. Unchanged axillary lymph nodes.     SKELETON, CHEST WALL: No destructive bone lesion     UPPER ABDOMEN: Patulous esophagus. 3 mm right renal stone.       RHC (9/02/2020):  RA   8 mmHg   RV   40/5 mmHg   PA (mean)  44/16 (25) mmHg   PCWP   12 mmHg   CO   4.65 lpm (Thermodilution)   CI   2.65 lpm/m-squared   SVI    33 ml/beat/m-squared (lower limit normal 29)   PVR   2.8 mmHg/l/min (Wood units)   SVR   2064 dyne-sec-cm-5         PFT (3/18/2020):      TTE (3/13/2020, outside study):  · The study quality was good.   · The left ventricle is normal in size. Top normal left ventricular wall   thickness. There are no segmental wall motion abnormalities. The left   ventricular ejection fraction is 55-60% by visual estimate and 3D   echocardiography. Normal left ventricular ejection fraction.   · There is grade I (mild) LV diastolic dysfunction.   · The right ventricle is normal in size. There is normal function of the   right ventricle.   · The right atrium is mildly dilated.   · There is mild mitral valve anterior leaflet thickening. There is mild   mitral valve leaflet calcification. There is flattening of the mitral   leaflets without raphael prolapse. There is trace mitral regurgitation.   · The aortic valve is trileaflet. There is mild thickening of the aortic   valve. There is mild calcification of the aortic valve. There is no aortic   /stenosis. There is trace aortic regurgitation.   · There is mild tricuspid valve leaflet thickening. There is mild to   moderate tricuspid regurgitation. The pulmonary artery systolic pressure   is moderately elevated. Pulmonary artery  systolic pressure measures 50   mmHg. There is mid-systolic notching of the RVOT VTI consistent with   elevated pulmonary vascular resistance.   · The inferior vena cava is normal in size (diameter <21 mm) and decreases   >50% in size with inspiration, suggesting a normal right atrial pressure   of 3 mmHg (range 0-5 mm Hg).   · Trivial loculated pericardial effusion present adjacent to the right   ventricle and adjacent to the right atrium.          Assessment / Plan:     1. Chest Pain:  - Given pattern and character, I believe this is musculoskeletal / serous pain from her autoimmune disease. There may be an element of myopericarditis.  - She reports that pain has not responded to increased in steroids in the past   - Non-ACS chest pain and known non-obstructive CAD as recently as March 2024 CCTA.   -A lidocaine patch is in place.  Hopefully this will help her pain.    2. Pulmonary Hypertension (WHO Group I, NYHA/WHO FC III):   - Occurring in the background of Systemic Scleroderma with ILD. August 2022 TTE showed normal RV size and function, but progressive exertional decline, worsened DLCO and resting tachycardia suggest there may be progression of her pulmonary vascular disease and limitation of cardiac output. This was proven with 11/2022 RHC showing mean PA 35 mmHg (with PCWP 13 mmHg) and PVR 4.3 Wood units. Recent TTEs have shown appropriate RV:PA coupling with normal RV size and systolic function.   - She is hypervolemic on exam  - CXR improved today  -Recommend to continue diuresis, ethacrynic acid 50 mg p.o. daily with additional dose this evening. This is higher than home dose so hopefully will achieve diuresis.  Please continue daily standing weights.  Please start recording I's and O's.    - Continue Sildenafil 20 mg TID   - She should continue uninterrupted Macitentan 10 mg daily -her home medication has been brought in and she is receiving it.    3. HTN:   - Continue Amlodipine.     4. Systemic  Scleroderma / Raynaud's Disease:   - Per Rheumatology (Dr. Trevizo).      5. ILD:   - Per Pulmonology and Rheumatology (Dr. Trevizo). She has not tolerated trials of Mycophenolate. She briefly received Tocilizumab.     6. PE:   - She has had elevated probability on V/Q scans, but CTA Chest has consistently not shown evidence for acute or chronic thromboembolism. Apixaban has been discontinued.     7. CAD:   - Two-vessel non-obstructive disease on March 2024 CCTA. She is continued on Atorvastatin 40 mg daily.     8. Toe Ulcerations  - Per Rheumatology, unlikely to see LE ulcerations with scleroderma. S/P left PTA dSFA/TP and DCB to popliteal arteries 8/14/24. Followed by vascular surgery. Started on Plavix which is currently being held due to hematoma found on CT scan along with hemoglobin drop.    9. Lung Mass  - Pathology from 8/8 biopsy with atypical glandular cells, possibly consistent with adenocarcinoma  - Management per pulmonology    KARYNA Carmona  8/23/2024

## 2024-08-23 NOTE — PLAN OF CARE
Problem: Adult Inpatient Plan of Care  Goal: Plan of Care Review  Outcome: Progressing  Flowsheets (Taken 8/23/2024 0236)  Progress: no change  Outcome Evaluation: Pt on bowel regime for constipation, minimal stool passed. Getting dilaudid po/tylenol for abd pain. ENRIQUE 8/24.  Plan of Care Reviewed With: patient

## 2024-08-23 NOTE — PROGRESS NOTES
Hospital Medicine     Daily Progress Note       SUBJECTIVE   Interval History: Patient was seen and examined this a.m. she reports that she has finally gotten improvement in her bony chest pain with p.o. Dilaudid on board.  She is still having severe left ankle pain and right groin pain.  She is still having nausea and vomiting.  No bowel movement yet. She was able to eat dinner.      OBJECTIVE      Vital signs in last 24 hours:  Temp:  [36.7 °C (98.1 °F)-36.9 °C (98.4 °F)] 36.7 °C (98.1 °F)  Heart Rate:  [70-93] 72  Resp:  [18] 18  BP: (107-126)/(53-61) 107/53    Intake/Output Summary (Last 24 hours) at 8/23/2024 1536  Last data filed at 8/23/2024 1400  Gross per 24 hour   Intake 480 ml   Output 300 ml   Net 180 ml       PHYSICAL EXAMINATION      Physical Exam  Vitals and nursing note reviewed.   Constitutional:       General: She is not in acute distress.     Comments: Chronically ill-appearing, underweight   HENT:      Head: Normocephalic.   Eyes:      Conjunctiva/sclera: Conjunctivae normal.   Cardiovascular:      Rate and Rhythm: Normal rate and regular rhythm.   Pulmonary:      Effort: Pulmonary effort is normal. No respiratory distress.      Breath sounds: Normal breath sounds.   Abdominal:      General: Bowel sounds are normal. Mild abdominal distention     Palpations: Abdomen is soft.      Tenderness: There is no abdominal tenderness.   Musculoskeletal: Hand deformities-chronic     Cervical back: Neck supple.      Right lower leg: No edema.      Left lower leg: No edema.   Skin:     General: Skin is warm and dry.   Neurological:      Mental Status: She is alert and oriented to person, place, and time.   Psychiatric:         Behavior: Behavior normal.        LINES, CATHETERS, DRAINS, AIRWAYS, AND WOUNDS   Lines, Drains, and Airways:  Wounds (agree with documentation and present on admission):  Peripheral IV (Adult) 08/21/24 Anterior;Upper;Left Arm (Active)   Number of days: 1       Wound Anterior;Right  Toe (2nd) (Active)   Number of days: 14       Wound Anterior;Left Toe (Great) (Active)   Number of days: 14       Wound Anterior;Left Toe (2nd) (Active)   Number of days: 14       Wound Arterial Ulcer Anterior;Right Ankle (Active)   Number of days: 2       Wound Arterial Ulcer Anterior;Left Ankle (Active)   Number of days: 2       Wound Anterior;Left Toe (5th) (Active)   Number of days: 2       Surgical Incision Groin Right (Active)   Number of days: 8         Comments:    LABS / IMAGING / TELE      Labs  Results from last 7 days   Lab Units 08/23/24  1315   SODIUM mEQ/L 136   POTASSIUM mEQ/L 4.6   CHLORIDE mEQ/L 103   CO2 mEQ/L 28   BUN mg/dL 34*   CREATININE mg/dL 1.5*   GLUCOSE mg/dL 83   CALCIUM mg/dL 8.8     Results from last 7 days   Lab Units 08/23/24  1315   WBC K/uL 8.48   HEMOGLOBIN g/dL 7.4*   HEMATOCRIT % 25.4*   PLATELETS K/uL 222     Results from last 7 days   Lab Units 08/23/24  1315   MAGNESIUM mg/dL 2.0           Imaging  X-RAY ABDOMEN 1 VIEW    Result Date: 8/23/2024  Narrative: CLINICAL HISTORY: Concern for bowel obstruction. COMMENT: Single view of the abdomen was obtained. COMPARISON: 8/22/2024. There is redemonstration of large amount of stool throughout the colon. Otherwise nonspecific nonobstructive bowel gas pattern for single view of the abdomen.     Impression: IMPRESSION: Findings as described above.            ECG/Telemetry  Reviewed    ASSESSMENT AND PLAN      Nausea & vomiting  Assessment & Plan  Pt reporting lower quadrant abdominal pain day after angio.  No evidence or bruising at fem access site or palpable masses, but CT demonstrated R RP hematoma. Also having intermittent nausea/vomiting.  LFTs unremarkable and lipase within normal limits.  CTAP showed an unremarkable gallbladder, pancreas.  UA bland.  CT did reveal a hematoma in the right inguinal region. She is afebrile without leukocytosis.    Her nausea/vomiting is thought to be multifactorial in the setting of constipation  "and medication side effects.      KUB with extensive stool retention in the colon, no evidence of obstruction  Discussed with GI who saw earlier during her course for possible CGE, will schedule IV zofran and give half miralax prep. Give tap water enema. Schedule supps. If this does not work, will consider methylnaltrexone for opioid induced constipation. Consider asking GI to formally see tomorrow if no improvement.  Encouraged pt to not take medications on an empty stomach.   PRN pepcid and increase Protonix to 40mg daily in case GERD playing role  I also think anxiety is playing a significant role in her symptoms, I have discussed this with the patient.    Chest pain  Assessment & Plan  In regards to the chest pain. Pt has chronic chest pain which is what prompted her work up for lung pathology, however she described this chest pain as \"bony chest pain \".  Exam and history suggest MSK etiology vs serous pain from her autoimmune disease.  No concern for ACS. Pain management.    Wound of lower extremity  Assessment & Plan  she was found to have bilateral lower extremity wounds and bilateral second digit dry gangrene. Podiatry and Vascular were consulted. She underwent LLE angio, balloon angio of distal SFA, TP trunk, DCB angio of popliteal artery on 8/14. Now on Plavix 75mg daily. She has an allergy to aspirin. Continue local wound care.     Interstitial lung disease (CMS/HCC)  Assessment & Plan  DPLD  Does not appear to be in exacerbation for this  Has not tolerated trials of Mycophenolate. On Tocilizumab.   On 2L O2-NC chronically    Hematoma  Assessment & Plan  Imaging reviewed by Vascular Dr. Thomas; Probable small hematoma at the right groin near the access site extending into the preperitoneal area without evidence of contrast extravasation. There is no pseudoaneurysm on US.  --Hgb has been stable without need for blood transfusion  --Plavix restarted 8/17    Pain of both eyes  Assessment & Plan  Late 8/10 " noted right eye pain,, swelling, blurry vision  Seen by ophthalmology  No concern for preseptal or orbital cellulitis  CT orbits: No evidence of acute process involving the orbits  Carotid US negative for HD significant stenosis  Advised various drops, ointments for her eyes--> per MAR has been declining eye drops as recommended by ortho  Advised stopping Lyrica  Both blurred vision and eye pain have resolved    Pain  Assessment & Plan  Her acute/chronic pain has been extremely difficult to control.  She has multiple drug allergies including morphine, oxycodone, NSAIDs.  She is also very reluctant to start any new medications.  We consulted pain management who advised addition of Lyrica, Flexeril, tramadol.  Lyrica was ultimately stopped as per ophthalmology recommendations given concerns of it causing her blurry vision/dry eyes. She did not tolerate tramadol due to ?increased HR. She tolerated flexeril so will continue. Palliative care was also consulted. They recommended trial of fentanyl which pt tolerated but didn't find it helpful. Started PO dilaudid which may be contributing to nausea/vomiting/constipation however pt feels that it is the only thing that is finally helping with her pain. Also started gabapentin 200mg TID which can be uptitrated in the outpt setting. She will f/u with Pain management outpt for scheduled appointment. L ankle/L ft Xrays unremarkable.     Pulmonary nodule  Assessment & Plan  Left lower lobe nodule, s/p ION navigational bronchoscopy with biopsy showing a small focus of atypical glands.     -Pulm input appreciated  -outpatient follow up with pulmonary regarding biopsy findings    Acute on chronic heart failure with preserved ejection fraction (CMS/HCC)  Assessment & Plan  EF 65%  Known to Cardiology Dr. Arvizu, in-house consult placed  S/p IV ethacrynic acid, now back on PO  Continue pHTN meds; opsumit, revatio  CHF order set, daily weights, salt/fluid restriction, strict  Is&Os      Scleroderma (CMS/HCC)  Assessment & Plan  Again, chronic  Tocilizumab every 28 days    Pulmonary hypertension (CMS/[facility])  Assessment & Plan  Chronic, extensive history    Continue home med macitentan  On sildenafil  Ethacrynic acid 50mg resumed 8/17    Essential (primary) hypertension  Assessment & Plan  Pressure stable here, continue home medications         VTE Assessment: Padua    VTE Prophylaxis:  Current anticoagulants:    None      Code Status: Full Code      Estimated Discharge Date: 8/27/2024   Disposition Planning: pending clinical course     KARYNA Bragg  8/23/2024

## 2024-08-23 NOTE — PROGRESS NOTES
Spent 45 minutes in care of Ms. Felix. Offered supportive presence, prayer as requested, and active listening. Ms. Felix shared that she is Religious and relies on her mary. Has strong support outside the hospital. Shared her medical journey and the emotions that come with hospitalization. Found prayer to bring her a sense of peace. Spiritual care will remain available as needed or desired. - Rev. Pavan Childs, Spiritual Care Coordinator   [phone]

## 2024-08-23 NOTE — PLAN OF CARE
Yassine [phone]/(f) [phone]-case #[id number] agreed with [facility] for pt D/C home. Faxed the progress note rescinding the Medicare appeal-appeal now closed/cancelled. Pt can remain in the hospital past 8/24/24. Notified [facility] of continued inpatient stay.

## 2024-08-23 NOTE — PROGRESS NOTES
Patient: Arielle Hammondstower  Location: [place] [address]  MRN:  [id number]  Today's date:  8/23/2024    Attempted to see patient for therapy. Unable due to patient refused (Pt continues to refuse therapy despite encouragement and education, D/C orders at this time. Re-consult PT if appropriate.).

## 2024-08-23 NOTE — PROGRESS NOTES
Medicare appeal is now being rescinded. Patient is no longer stable for discharge due to concern for ileus for which we must rule out small bowel obstruction. Patient requires continued inpatient hospital stay. There will be no anticipated D/C home over the next 48 hr.    Maddy Cope MD  1:00 PM 8/23/2024

## 2024-08-23 NOTE — PROGRESS NOTES
Palliative Care Progress Note    This is Hospital Day: 16    Conversation/Goals of Care: Life prolongation/restoration.       Nausea & vomiting  Assessment & Plan  In the setting of extensive stool burden  -See constipation problem  -Recommend holding Reglan until obstruction is r/o.   -Continue Zofran 4 mg every 6 hours as needed    Other constipation  Assessment & Plan  -LBM : 8/20  -Worsened constipation likely multifactorial r/t current hospitalization, heart failure on diuretic therapy, debility related to multiple medical comorbid conditions, opioids and patient previously declining bowel regimen.   -CT abdomen 8/9 with moderate colonic stool burden   -KUB 8/22 & 8/23  showing extensive colon stool burden  -    Plan:  - Agree w/ Miralax BID   - Agree w/ Senna 2 tablets BID  -Agree w/ Bisacodyl suppository TID and Fleets enema BID as needed  -Patient previously seen by GI, would re-engage            Pain  Assessment & Plan  - Chronic pain attributed to: chronic chest pain, scleroderma c/b pulmonary HTN/ILD, Raynaud's Disease  - New right lower abdominal pain worse after LLE angio on 8/14, extensive stool burden may also be contributory.   -Left upper chest/rib pain improved, left foot and right groin pain is unchanged.   - Patient with numerous allergies to various pain medications (opioids/NSAIDS). She reports no relief with Fentanyl.  She has used dilaudid in the past with some relief.   - PDMP queried: no recent scripts  - Home regimen: lidocaine patch  - Medications available inpatient: Acetaminophen 975 mg p.o. every 6 hours, lidocaine patches, cyclobenzaprine 5 mg p.o. 3 times daily as needed, heating pad  - Tramadol d/c'd 2/2 dizziness  - lyrica d/c'd 2/2 blurred vision  -Continue Gabapentin to 200 mg TID (continue to monitor renal function in the setting of n/v)  -Continue Dilaudid 2 mg PO every 4 hours as needed     Plan:  - Would establish care with a pain management provider        Palliative care by  specialist  Assessment & Plan  63 y.o. female with a PMH significant for severe scleroderma complicated by pulmonary hypertension, ILD.  She presented to [facility] on 8/8 for planned robotic navigational and EBUS bronchoscopies to evaluate growing LLL nodule c/b chest pain and volume overload.     - Code status: Full Code  - Prognosis:  High risk for acute deterioration and decline  - Capacity for Medical Decision Making: intact  - Advance Directives: No  - Goals of care: Goals are Life-prolonging/disease directed.  Refer to ACP note dated 8/10.  - Surrogate Decision Maker: Patient identifies her 2 adult children, Tiffanie and Wagner, as joint surrogate decision makers        Debility  Assessment & Plan  - Debility likely multifactorial in the setting of multiple chronic medical conditions including scleroderma, CHF, PVD s/p LLE angio c/b retroperitoneal hematoma.   - Living situation prior to hospitalization: Lives at home with 24/7 aides  - Baseline functional status is: Ambulatory short distances  - Current Palliative Performance Status: 40%  - Baseline Palliative Performance Status:  50-60%    - Frailty   - Patient remains high risk for further deterioration and functional decline.    Plan  - Patient open to both palliative bridge and home based palliative NP visits            Interval History (Subjective)    Source: chart review,  patient, nurse and primary medical team    Patient awake, alert oriented X4, conversing with visitor, NAD, left foot and right groin pain is unchanged, left rib/chest pain improved, nausea/vomiting ongoing, no BM, KUB with extensive stool burden.     Current Medications:    acetaminophen, 975 mg, oral, q6h SPENCER    amLODIPine, 10 mg, oral, q AM    artificial tears, 1 drop, Both Eyes, QID    atorvastatin, 40 mg, oral, Daily (6p)    benzocaine-menthol, 1 lozenge, Mouth/Throat, q2h PRN    benzonatate, 100 mg, oral, 3x daily PRN    bisacodyL, 10 mg, rectal, TID    carboxymethylcellulose, 1 drop,  Right Eye, 3x daily PRN    clopidogreL, 75 mg, oral, Daily    cyanocobalamin, 1,000 mcg, oral, Daily    cyclobenzaprine, 5 mg, oral, 3x daily PRN    glucose, 16-32 g of dextrose, oral, PRN **OR** dextrose, 15-30 g of dextrose, oral, PRN **OR** glucagon, 1 mg, intramuscular, PRN **OR** dextrose 50 % in water (D50), 25 mL, intravenous, PRN    diphenhydrAMINE, 50 mg, oral, q8h PRN    ethacrynic acid, 50 mg, oral, Daily    ethacrynic acid, 50 mg, oral, Once    famotidine, 20 mg, intravenous, Daily PRN **OR** famotidine, 20 mg, oral, Daily PRN    gabapentin, 200 mg, oral, TID    honey, , Topical, Daily PRN    HYDROmorphone, 2 mg, oral, q4h PRN    levothyroxine, 100 mcg, oral, Daily (6:30a)    lidocaine, 1 patch, Topical, Daily    lidocaine, 1 patch, Topical, Daily    lidocaine, 1 patch, Topical, Daily    macitentan, 10 mg, oral, Daily    melatonin, 3 mg, oral, Nightly PRN    ondansetron ODT, 4 mg, oral, q6h PRN **OR** ondansetron, 4 mg, intravenous, q6h PRN    pantoprazole, 40 mg, oral, Daily    artificial tears, 1 drop, Both Eyes, q4h while awake    phenol, 1 spray, Mouth/Throat, q2h PRN    polyethylene glycol, 17 g, oral, BID    senna, 2 tablet, oral, BID    sildenafiL (pulm.hypertension), 20 mg, oral, TID    sodium chloride, 2 spray, Each Nostril, Daily    sodium phosphates, 1 enema, rectal, BID    white petrolatum, , Topical, q4h PRN    white petrolatum, , Topical, BID    white petrolatum-mineral oil, , Both Eyes, Nightly    clopidogreL    cyclobenzaprine    ethacrynic acid    gabapentin    honey    HYDROmorphone    ondansetron    polyethylene glycol    Objective    Physical Exam:  General:   No Acute Distress  Eyes:  Sclera Anicteric  ENMT:  Mucus Membranes Moist  CV:  Radial Pulses 3  bilateral  Lungs:  No evidence of increased WOB  Abdomen:  Bowel Sounds present  Psych:  Appropriate and Cooperative  Neuro:  Awake, Alert and Oriented  person, place, time/date and situation  Skin:  No Rash    Vitals:  Vitals:     08/23/24 1145   BP: (!) 107/53   Pulse: 72   Resp: 18   Temp: 36.7 °C (98.1 °F)   SpO2: 100%       Laboratory Studies:    CBC Results         08/23/24 08/20/24 08/19/24     1315 0611 0622    WBC 8.48 8.59 7.79    RBC 3.06 3.24 3.27    HGB 7.4 7.8 8.0    HCT 25.4 27.4 27.3    MCV 83.0 84.6 83.5    MCH 24.2 24.1 24.5    MCHC 29.1 28.5 29.3     213 184          CMP Results         08/23/24 08/20/24 08/19/24     1315 0611 0622     139 140    K 4.6 4.8 4.4    Cl 103 105 106    CO2 28 26 27    Glucose 83 82 79    BUN 34 30 25    Creatinine 1.5 1.2 1.1    Calcium 8.8 9.1 9.1    Anion Gap 5 8 7    AST -- -- 11    ALT -- -- 9    Albumin -- -- 3.3    EGFR 39.0 51.0 56.6       Troponin I Results         08/09/24 08/09/24 07/25/24     1232 1048 1732    HS Troponin I 93.8 92.5 66.8           Comment for HS Troponin I at 1048 on 08/09/24: Result rechecked  Filtered to eliminate fibrin      Comment for HS Troponin I at 1732 on 07/25/24: Consistent with previous results         ABG Results         04/13/23     1949    Source Of Oxygen nc       Labs reviewed-significant for anemia, elevated creatine    Imaging and Other Studies:     Radiology Imaging    XR ABDOMEN 1 VW    Narrative  CLINICAL HISTORY: Concern for bowel obstruction.    COMMENT: Single view of the abdomen was obtained.    COMPARISON: 8/22/2024.    There is redemonstration of large amount of stool throughout the colon.  Otherwise nonspecific nonobstructive bowel gas pattern for single view of the  abdomen.    Impression  IMPRESSION:  Findings as described above.                                                                      Cardiac Imaging    TRANSTHORACIC ECHO (TTE) LIMITED 07/19/2024    Interpretation Summary  Rhythm is sinus tachycardia.    Mild increased wall thickness with normal left ventricular cavity and preserved systolic function. EF 65%. No wall motion abnormalities.  Normal right ventricular size with preserved systolic function.  IVC  normal in size with >50% respiratory variation consistent with normal right sided filling pressures.  Small pericardial effusion. No evidence of hemodynamic compromise with normal respiratory variation and no chamber collapse. Prominent epicardial fat.  Compared with previous study of 5/23/2024, small pericardial effusion persists.       I have independently reviewed the pertinent imaging to the time of note and agree with reported results.      Lab Results   Component Value Date    QTCCALCULAT 473 08/13/2024       Discussed with: Medical Team, RN     KARYNA Pop  Palliative Care Nurse Practitioner  [facility]  [phone] [facility] [place]  [phone] [facility] [place]  [address]  [place] [phone]  Pager [phone]          unable to assess immunization status...

## 2024-08-24 ENCOUNTER — APPOINTMENT (INPATIENT)
Dept: RADIOLOGY | Facility: HOSPITAL | Age: 64
DRG: 515 | End: 2024-08-24
Attending: STUDENT IN AN ORGANIZED HEALTH CARE EDUCATION/TRAINING PROGRAM
Payer: COMMERCIAL

## 2024-08-24 LAB
ABO + RH BLD: NORMAL
ANION GAP SERPL CALC-SCNC: 5 MEQ/L (ref 3–15)
BASOPHILS # BLD: 0.02 K/UL (ref 0.01–0.1)
BASOPHILS NFR BLD: 0.3 %
BLD GP AB SCN SERPL QL: NEGATIVE
BUN SERPL-MCNC: 41 MG/DL (ref 7–25)
CALCIUM SERPL-MCNC: 9 MG/DL (ref 8.6–10.3)
CHLORIDE SERPL-SCNC: 106 MEQ/L (ref 98–107)
CO2 SERPL-SCNC: 28 MEQ/L (ref 21–31)
CREAT SERPL-MCNC: 1.6 MG/DL (ref 0.6–1.2)
D AG BLD QL: POSITIVE
DIFFERENTIAL METHOD BLD: ABNORMAL
EGFRCR SERPLBLD CKD-EPI 2021: 36.1 ML/MIN/1.73M*2
EOSINOPHIL # BLD: 0.21 K/UL (ref 0.04–0.36)
EOSINOPHIL NFR BLD: 2.7 %
ERYTHROCYTE [DISTWIDTH] IN BLOOD BY AUTOMATED COUNT: 20.9 % (ref 11.7–14.4)
GLUCOSE SERPL-MCNC: 125 MG/DL (ref 70–99)
HCT VFR BLD AUTO: 24.8 % (ref 35–45)
HGB BLD-MCNC: 7.3 G/DL (ref 11.8–15.7)
IMM GRANULOCYTES # BLD AUTO: 0.03 K/UL (ref 0–0.08)
IMM GRANULOCYTES NFR BLD AUTO: 0.4 %
LABORATORY COMMENT REPORT: NORMAL
LYMPHOCYTES # BLD: 0.76 K/UL (ref 1.2–3.5)
LYMPHOCYTES NFR BLD: 9.7 %
MAGNESIUM SERPL-MCNC: 2 MG/DL (ref 1.8–2.5)
MCH RBC QN AUTO: 24.6 PG (ref 28–33.2)
MCHC RBC AUTO-ENTMCNC: 29.4 G/DL (ref 32.2–35.5)
MCV RBC AUTO: 83.5 FL (ref 83–98)
MONOCYTES # BLD: 0.44 K/UL (ref 0.28–0.8)
MONOCYTES NFR BLD: 5.6 %
NEUTROPHILS # BLD: 6.38 K/UL (ref 1.7–7)
NEUTS SEG NFR BLD: 81.3 %
NRBC BLD-RTO: 0 %
PDW BLD AUTO: 10.9 FL (ref 9.4–12.3)
PLATELET # BLD AUTO: 233 K/UL (ref 150–369)
POTASSIUM SERPL-SCNC: 4.8 MEQ/L (ref 3.5–5.1)
RBC # BLD AUTO: 2.97 M/UL (ref 3.93–5.22)
SODIUM SERPL-SCNC: 139 MEQ/L (ref 136–145)
SPECIMEN EXP DATE BLD: NORMAL
WBC # BLD AUTO: 7.84 K/UL (ref 3.8–10.5)

## 2024-08-24 PROCEDURE — 63600000 HC DRUGS/DETAIL CODE: Mod: JZ | Performed by: STUDENT IN AN ORGANIZED HEALTH CARE EDUCATION/TRAINING PROGRAM

## 2024-08-24 PROCEDURE — 85025 COMPLETE CBC W/AUTO DIFF WBC: CPT | Performed by: STUDENT IN AN ORGANIZED HEALTH CARE EDUCATION/TRAINING PROGRAM

## 2024-08-24 PROCEDURE — 63700000 HC SELF-ADMINISTRABLE DRUG: Performed by: INTERNAL MEDICINE

## 2024-08-24 PROCEDURE — 83735 ASSAY OF MAGNESIUM: CPT | Performed by: STUDENT IN AN ORGANIZED HEALTH CARE EDUCATION/TRAINING PROGRAM

## 2024-08-24 PROCEDURE — 63700000 HC SELF-ADMINISTRABLE DRUG: Performed by: STUDENT IN AN ORGANIZED HEALTH CARE EDUCATION/TRAINING PROGRAM

## 2024-08-24 PROCEDURE — 74018 RADEX ABDOMEN 1 VIEW: CPT

## 2024-08-24 PROCEDURE — 86850 RBC ANTIBODY SCREEN: CPT

## 2024-08-24 PROCEDURE — 63600000 HC DRUGS/DETAIL CODE: Mod: JZ | Performed by: NURSE PRACTITIONER

## 2024-08-24 PROCEDURE — 36415 COLL VENOUS BLD VENIPUNCTURE: CPT | Performed by: STUDENT IN AN ORGANIZED HEALTH CARE EDUCATION/TRAINING PROGRAM

## 2024-08-24 PROCEDURE — 99233 SBSQ HOSP IP/OBS HIGH 50: CPT | Performed by: STUDENT IN AN ORGANIZED HEALTH CARE EDUCATION/TRAINING PROGRAM

## 2024-08-24 PROCEDURE — 21400000 HC ROOM AND CARE CCU/INTERMEDIATE

## 2024-08-24 PROCEDURE — 93005 ELECTROCARDIOGRAM TRACING: CPT | Performed by: NURSE PRACTITIONER

## 2024-08-24 PROCEDURE — 86900 BLOOD TYPING SEROLOGIC ABO: CPT

## 2024-08-24 PROCEDURE — 80048 BASIC METABOLIC PNL TOTAL CA: CPT | Performed by: STUDENT IN AN ORGANIZED HEALTH CARE EDUCATION/TRAINING PROGRAM

## 2024-08-24 PROCEDURE — 63700000 HC SELF-ADMINISTRABLE DRUG: Performed by: HOSPITALIST

## 2024-08-24 PROCEDURE — 86923 COMPATIBILITY TEST ELECTRIC: CPT

## 2024-08-24 PROCEDURE — 63700000 HC SELF-ADMINISTRABLE DRUG: Performed by: NURSE PRACTITIONER

## 2024-08-24 RX ORDER — DEXTROMETHORPHAN POLISTIREX 30 MG/5 ML
133 SUSPENSION, EXTENDED RELEASE 12 HR ORAL 2 TIMES DAILY
Status: DISCONTINUED | OUTPATIENT
Start: 2024-08-24 | End: 2024-09-03 | Stop reason: HOSPADM

## 2024-08-24 RX ORDER — FLUCONAZOLE 100 MG/1
150 TABLET ORAL ONCE
Status: COMPLETED | OUTPATIENT
Start: 2024-08-24 | End: 2024-08-24

## 2024-08-24 RX ORDER — POLYETHYLENE GLYCOL 3350 17 G/17G
119 POWDER, FOR SOLUTION ORAL ONCE
Status: COMPLETED | OUTPATIENT
Start: 2024-08-24 | End: 2024-08-24

## 2024-08-24 RX ADMIN — GABAPENTIN 200 MG: 100 CAPSULE ORAL at 13:19

## 2024-08-24 RX ADMIN — SILDENAFIL 20 MG: 20 TABLET, FILM COATED ORAL at 09:00

## 2024-08-24 RX ADMIN — LIDOCAINE 4% 1 PATCH: 40 PATCH TOPICAL at 11:59

## 2024-08-24 RX ADMIN — LIDOCAINE 4% 1 PATCH: 40 PATCH TOPICAL at 12:01

## 2024-08-24 RX ADMIN — SODIUM CHLORIDE, POTASSIUM CHLORIDE, SODIUM LACTATE AND CALCIUM CHLORIDE 500 ML: 600; 310; 30; 20 INJECTION, SOLUTION INTRAVENOUS at 18:22

## 2024-08-24 RX ADMIN — ACETAMINOPHEN 975 MG: 325 TABLET ORAL at 05:49

## 2024-08-24 RX ADMIN — WHITE PETROLATUM: 1.75 OINTMENT TOPICAL at 20:17

## 2024-08-24 RX ADMIN — GLYCERIN 1 DROP: .002; .002; .01 SOLUTION/ DROPS OPHTHALMIC at 08:57

## 2024-08-24 RX ADMIN — LEVOTHYROXINE SODIUM 100 MCG: 0.1 TABLET ORAL at 05:50

## 2024-08-24 RX ADMIN — WHITE PETROLATUM: 1.75 OINTMENT TOPICAL at 09:01

## 2024-08-24 RX ADMIN — ACETAMINOPHEN 975 MG: 325 TABLET ORAL at 18:22

## 2024-08-24 RX ADMIN — MINERAL OIL 133 ML: 118 ENEMA RECTAL at 12:43

## 2024-08-24 RX ADMIN — SENNOSIDES 2 TABLET: 8.6 TABLET, FILM COATED ORAL at 09:00

## 2024-08-24 RX ADMIN — ONDANSETRON 4 MG: 2 INJECTION INTRAMUSCULAR; INTRAVENOUS at 05:50

## 2024-08-24 RX ADMIN — SALINE NASAL SPRAY 2 SPRAY: 1.5 SOLUTION NASAL at 09:01

## 2024-08-24 RX ADMIN — BISACODYL 10 MG: 10 SUPPOSITORY RECTAL at 09:02

## 2024-08-24 RX ADMIN — DEXTRAN 70, GLYCERIN, HYPROMELLOSE 1 DROP: 1; 2; 3 SOLUTION/ DROPS OPHTHALMIC at 08:58

## 2024-08-24 RX ADMIN — SENNOSIDES 2 TABLET: 8.6 TABLET, FILM COATED ORAL at 20:17

## 2024-08-24 RX ADMIN — GLYCERIN 1 DROP: .002; .002; .01 SOLUTION/ DROPS OPHTHALMIC at 18:23

## 2024-08-24 RX ADMIN — ONDANSETRON 4 MG: 2 INJECTION INTRAMUSCULAR; INTRAVENOUS at 12:13

## 2024-08-24 RX ADMIN — LIDOCAINE 4% 1 PATCH: 40 PATCH TOPICAL at 12:03

## 2024-08-24 RX ADMIN — GABAPENTIN 200 MG: 100 CAPSULE ORAL at 20:16

## 2024-08-24 RX ADMIN — SILDENAFIL 20 MG: 20 TABLET, FILM COATED ORAL at 13:19

## 2024-08-24 RX ADMIN — GLYCERIN 1 DROP: .002; .002; .01 SOLUTION/ DROPS OPHTHALMIC at 20:16

## 2024-08-24 RX ADMIN — ACETAMINOPHEN 975 MG: 325 TABLET ORAL at 22:32

## 2024-08-24 RX ADMIN — SILDENAFIL 20 MG: 20 TABLET, FILM COATED ORAL at 20:17

## 2024-08-24 RX ADMIN — CLOPIDOGREL 75 MG: 75 TABLET ORAL at 08:58

## 2024-08-24 RX ADMIN — ACETAMINOPHEN 975 MG: 325 TABLET ORAL at 12:12

## 2024-08-24 RX ADMIN — GLYCERIN 1 DROP: .002; .002; .01 SOLUTION/ DROPS OPHTHALMIC at 05:50

## 2024-08-24 RX ADMIN — HYDROMORPHONE HYDROCHLORIDE 2 MG: 2 TABLET ORAL at 04:09

## 2024-08-24 RX ADMIN — DEXTRAN 70, GLYCERIN, HYPROMELLOSE 1 DROP: 1; 2; 3 SOLUTION/ DROPS OPHTHALMIC at 18:24

## 2024-08-24 RX ADMIN — CYANOCOBALAMIN TAB 1000 MCG 1000 MCG: 1000 TAB at 08:58

## 2024-08-24 RX ADMIN — AMLODIPINE BESYLATE 10 MG: 10 TABLET ORAL at 08:57

## 2024-08-24 RX ADMIN — GLYCERIN 1 DROP: .002; .002; .01 SOLUTION/ DROPS OPHTHALMIC at 12:16

## 2024-08-24 RX ADMIN — DEXTRAN 70, GLYCERIN, HYPROMELLOSE 1 DROP: 1; 2; 3 SOLUTION/ DROPS OPHTHALMIC at 13:19

## 2024-08-24 RX ADMIN — BISACODYL 10 MG: 10 SUPPOSITORY RECTAL at 20:15

## 2024-08-24 RX ADMIN — DEXTRAN 70, GLYCERIN, HYPROMELLOSE 1 DROP: 1; 2; 3 SOLUTION/ DROPS OPHTHALMIC at 22:31

## 2024-08-24 RX ADMIN — ONDANSETRON 4 MG: 2 INJECTION INTRAMUSCULAR; INTRAVENOUS at 18:22

## 2024-08-24 RX ADMIN — POLYETHYLENE GLYCOL 3350 119 G: 17 POWDER, FOR SOLUTION ORAL at 13:24

## 2024-08-24 RX ADMIN — MINERAL OIL, WHITE PETROLATUM: .03; .94 OINTMENT OPHTHALMIC at 22:29

## 2024-08-24 RX ADMIN — PANTOPRAZOLE SODIUM 40 MG: 40 TABLET, DELAYED RELEASE ORAL at 09:00

## 2024-08-24 RX ADMIN — FLUCONAZOLE 150 MG: 100 TABLET ORAL at 01:06

## 2024-08-24 RX ADMIN — GABAPENTIN 200 MG: 100 CAPSULE ORAL at 08:59

## 2024-08-24 ASSESSMENT — COGNITIVE AND FUNCTIONAL STATUS - GENERAL
MOVING TO AND FROM BED TO CHAIR: 3 - A LITTLE
WALKING IN HOSPITAL ROOM: 3 - A LITTLE
STANDING UP FROM CHAIR USING ARMS: 3 - A LITTLE
CLIMB 3 TO 5 STEPS WITH RAILING: 4 - NONE

## 2024-08-24 NOTE — PROGRESS NOTES
Per updates from DCP rounds, pt is not stable for DC home today. ENRIQUE TBD. Appeal was cancelled Friday as pt is not stable for Dc and staying inpatient per Dr. Cope. CM SW will follow for needs and provide support until DC complete.

## 2024-08-24 NOTE — PLAN OF CARE
Problem: Adult Inpatient Plan of Care  Goal: Plan of Care Review  Outcome: Progressing  Flowsheets (Taken 8/24/2024 0111)  Progress: no change  Outcome Evaluation: Pt A&O * 4, requiring stand-by assistance. Bowel regiment continued, with minimal stool passed. Tylenol PO continued for abdominal pain. Intermittent N/V with relief of symptoms. Call bell remains within reach, and Pt rings appropriate to needs.  Plan of Care Reviewed With: patient  Goal: Patient-Specific Goal (Individualized)  Outcome: Progressing  Flowsheets (Taken 8/24/2024 0111)  Patient/Family-Specific Goals (Include Timeframe): N/V and Bowel management. D/C within 48 hours, if health permits.  Individualized Care Needs: Assist * 1. N/V triggers limited. Bowel movements.  Anxieties, Fears or Concerns: N/V, pain control, and constipation.   Plan of Care Review  Plan of Care Reviewed With: patient  Progress: no change  Outcome Evaluation: Pt A&O * 4, requiring stand-by assistance. Bowel regiment continued, with minimal stool passed. Tylenol PO continued for abdominal pain. Intermittent N/V with relief of symptoms. Call bell remains within reach, and Pt rings appropriate to needs.

## 2024-08-24 NOTE — PLAN OF CARE
Problem: Fall Injury Risk  Goal: Absence of Fall and Fall-Related Injury  Outcome: Progressing  Intervention: Identify and Manage Contributors  Flowsheets (Taken 8/24/2024 1055)  Medication Review/Management: medications reviewed  Self-Care Promotion: independence encouraged     Problem: Self-Care Deficit  Goal: Improved Ability to Complete Activities of Daily Living  Outcome: Progressing  Intervention: Promote Activity and Functional [place]  Flowsheets (Taken 8/24/2024 1055)  Activity Assistance Provided: assistance, 1 person  Cognitive Support Measures: consistent daily routine maintained

## 2024-08-24 NOTE — PROGRESS NOTES
Hospital Medicine Service -  Daily Progress Note       SUBJECTIVE   Interval History: Patient seen at the bedside this morning. She had minimal stool output overnight and then a very small, soft amount of stool this morning. She says she continued to vomit multiple times yesterday and a few times overnight. None so far this morning. Her chest pain continues to improve; however, abdominal pain and L foot pain remain severe, 10/10. She is agreeable to as second 1/2 bowel prep today.     Of note, she was given diflucan overnight for possible yeast infection.     OBJECTIVE      Vital signs in last 24 hours:  Temp:  [36.6 °C (97.8 °F)-36.9 °C (98.4 °F)] 36.6 °C (97.8 °F)  Heart Rate:  [] 97  Resp:  [16-20] 16  BP: (114-140)/(56-64) 131/63    Intake/Output Summary (Last 24 hours) at 8/24/2024 1611  Last data filed at 8/24/2024 0600  Gross per 24 hour   Intake 410 ml   Output 851 ml   Net -441 ml       PHYSICAL EXAMINATION        GEN: Chronically ill-appearing, Frail, Comfortable, No acute distress, Alert and oriented x3  HEENT: PERRL, EOMI, Moist mucous membranes, Neck supple, No JVD  CV: Regular rate and rhythm, Normal S1/S2, No murmurs, No rubs, Chest pain on palpation  PULM:  Non-labored breathing on 2L NC, Clear to auscultation bilaterally, No crackles or rhonchi  ABD: Soft, Generally tender, Moderately distended, Bowel sounds present   EXT: Chronic hand deformities due to systemic sclerosis, L ankle pain on palpation, Dry gangrene on various spots of both feet  DERM: Chronic skin changes due to systemic sclerosis  NEURO: CN II-XII grossly intact, No focal motor or sensory deficits  : No reed catheter  PSYCH: Appropriate mood and affect, Full range       LINES, CATHETERS, DRAINS, AIRWAYS, AND WOUNDS   Lines, Drains, and Airways:   Peripheral IV (Adult) 08/21/24 Anterior;Upper;Left Arm (Active)   Number of days: 3       Wound Anterior;Right Toe (2nd) (Active)   Number of days: 16       Wound Anterior;Left  Toe (Great) (Active)   Number of days: 16       Wound Anterior;Left Toe (2nd) (Active)   Number of days: 16       Wound Arterial Ulcer Anterior;Right Ankle (Active)   Number of days: 4       Wound Arterial Ulcer Anterior;Left Ankle (Active)   Number of days: 4       Wound Anterior;Left Toe (5th) (Active)   Number of days: 4       Surgical Incision Groin Right (Active)   Number of days: 10       Wounds (agree with documentation and present on admission)     LABS / IMAGING / TELE      Labs  Lab Results   Component Value Date    GLUCOSE 125 (H) 08/24/2024    CALCIUM 9.0 08/24/2024     08/24/2024    K 4.8 08/24/2024    CO2 28 08/24/2024     08/24/2024    BUN 41 (H) 08/24/2024    CREATININE 1.6 (H) 08/24/2024       Lab Results   Component Value Date    WBC 7.84 08/24/2024    HGB 7.3 (L) 08/24/2024    HCT 24.8 (L) 08/24/2024    MCV 83.5 08/24/2024     08/24/2024       Imaging  X-RAY ABDOMEN 1 VIEW    Result Date: 8/23/2024  CLINICAL HISTORY: Concern for bowel obstruction. COMMENT: Single view of the abdomen was obtained. COMPARISON: 8/22/2024. There is redemonstration of large amount of stool throughout the colon. Otherwise nonspecific nonobstructive bowel gas pattern for single view of the abdomen.     IMPRESSION: Findings as described above.      Telemetry  Reviewed independently. No events.    ASSESSMENT AND PLAN        # Chronic pain -- Patient reports multiple areas of chronic pain with associated point tenderness on exam. She feels this pain is acutely worsened during this hospitalization. Imaging negative. Likely MSK in etiology. Pain has been very difficult to control due to either lack of improvement, intolerable side effects, or allergies. She has tried several regimens including IV fentanyl without improvement. PC and Pain Management following.  - Chest pain somewhat improved today, L ankle pain and abdominal pain persist  - Continue gabapentin and PO dilaudid today as she is finally having  some relief on this regimen  - Scheduled outpatient appointment with Pain Management, Dr. Charles on 9/23/2024 at 1:30pm     # Nausea/Vomiting, Ileus, Constipation -- Patient with persistent and worsening nausea/vomiting this week along with progressive constipation. Likely in the setting of heavy opiate use for pain control as above. KUB shows significant stool retention throughout the colon. Non-obstructive bowel gas pattern.  - Scheduled IV zofran, Consider addition of reglan or compazine in combination  - Trial methylnaltrexone every other day, PC approved  - S/p 1/2 dose bowel prep on 8/23; Will give another 1/2 bowel prep today  - Bisacodyl suppository TID, Mineral oil enema BID  - Consult GI if additional assistance is needed     # GEORGE -- Cr up to 1.6 today from baseline 1.1-1.2. Possibly prerenal iso emesis.  - Holding ethacrynic acid for now  - Trial 500cc LR today  - Avoid nephrotoxins, Renally dose meds     # Dry gangrene of bilateral toes, PAD -- S/p LLE balloon angiogram with Vascular Surgery on 8/14.  - Local wound care  - Continue clopidogrel daily, She is allergic to aspirin  - Continue statin     # R groin hematoma -- Suffered as a complication of LLE angiogram for dry gangrene/PAD as above. No active extravasation on CTA. C/b drop in Hb to ~7, but now stabilized to ~8.  - Clopidogrel resumed on 8/17  - CTM Hb     # ILD, Pulm HTN -- Chronic on 2L NC. Cardiology following here.  - Continue home sildenafil, macitentan  - Hold ethacrynic acid iso GEORGE and persistent vomiting     # Pulmonary nodule -- S/p EBUS with path showing small focus of atypical glands. - Outpatient Pulmonology follow-up.     # Bilateral eye pain -- Seen by Optho, but she is refusing drops recommended by them.        VTE Assessment: Padua    VTE Prophylaxis:  Current anticoagulants:    None      Code Status: Full Code      Estimated Discharge Date: 8/27/2024   Disposition Planning: D/c when ileus and GEORGE are improved     Maddy  MD Prisca  8/24/2024

## 2024-08-24 NOTE — NURSING NOTE
Assess complete, aao x3, denies pain currently, appetite good for breakfast , abd xray done , no n/ v this am, 2l nc sat 95, nsr 96 cont to monitor, no stools this am , supp given

## 2024-08-24 NOTE — NURSING NOTE
Pt had small stool in br, enema then given miralax started per order with reid , dr medina notified

## 2024-08-25 LAB
ANION GAP SERPL CALC-SCNC: 4 MEQ/L (ref 3–15)
ANISOCYTOSIS BLD QL SMEAR: ABNORMAL
ATRIAL RATE: 68
BASOPHILS # BLD: 0.02 K/UL (ref 0.01–0.1)
BASOPHILS NFR BLD: 0.2 %
BUN SERPL-MCNC: 34 MG/DL (ref 7–25)
CALCIUM SERPL-MCNC: 8.7 MG/DL (ref 8.6–10.3)
CHLORIDE SERPL-SCNC: 108 MEQ/L (ref 98–107)
CO2 SERPL-SCNC: 27 MEQ/L (ref 21–31)
CREAT SERPL-MCNC: 1.5 MG/DL (ref 0.6–1.2)
DIFFERENTIAL METHOD BLD: ABNORMAL
EGFRCR SERPLBLD CKD-EPI 2021: 39 ML/MIN/1.73M*2
EOSINOPHIL # BLD: 0.33 K/UL (ref 0.04–0.36)
EOSINOPHIL NFR BLD: 3.9 %
ERYTHROCYTE [DISTWIDTH] IN BLOOD BY AUTOMATED COUNT: 21.1 % (ref 11.7–14.4)
FERRITIN SERPL-MCNC: 24 NG/ML (ref 11–250)
FOLATE SERPL-MCNC: 12.7 NG/ML
GLUCOSE SERPL-MCNC: 109 MG/DL (ref 70–99)
HCT VFR BLD AUTO: 23.3 % (ref 35–45)
HGB BLD-MCNC: 6.8 G/DL (ref 11.8–15.7)
HYPOCHROMIA BLD QL SMEAR: ABNORMAL
IMM GRANULOCYTES # BLD AUTO: 0.04 K/UL (ref 0–0.08)
IMM GRANULOCYTES NFR BLD AUTO: 0.5 %
IRON SATN MFR SERPL: 9 % (ref 15–45)
IRON SERPL-MCNC: 30 UG/DL (ref 35–150)
LYMPHOCYTES # BLD: 0.77 K/UL (ref 1.2–3.5)
LYMPHOCYTES NFR BLD: 9.1 %
MAGNESIUM SERPL-MCNC: 2.1 MG/DL (ref 1.8–2.5)
MCH RBC QN AUTO: 24.5 PG (ref 28–33.2)
MCHC RBC AUTO-ENTMCNC: 29.2 G/DL (ref 32.2–35.5)
MCV RBC AUTO: 83.8 FL (ref 83–98)
MICROCYTES BLD QL SMEAR: ABNORMAL
MONOCYTES # BLD: 0.57 K/UL (ref 0.28–0.8)
MONOCYTES NFR BLD: 6.8 %
NEUTROPHILS # BLD: 6.7 K/UL (ref 1.7–7)
NEUTS SEG NFR BLD: 79.5 %
NRBC BLD-RTO: 0 %
OVALOCYTES BLD QL SMEAR: ABNORMAL
P AXIS: 47
PDW BLD AUTO: 11.2 FL (ref 9.4–12.3)
PLAT MORPH BLD: NORMAL
PLATELET # BLD AUTO: 265 K/UL (ref 150–369)
PLATELET # BLD EST: ABNORMAL 10*3/UL
POLYCHROMASIA BLD QL SMEAR: ABNORMAL
POTASSIUM SERPL-SCNC: 4.7 MEQ/L (ref 3.5–5.1)
PR INTERVAL: 142
QRS DURATION: 84
QT INTERVAL: 428
QTC CALCULATION(BAZETT): 455
R AXIS: -4
RBC # BLD AUTO: 2.78 M/UL (ref 3.93–5.22)
SCHISTOCYTES BLD QL SMEAR: ABNORMAL
SODIUM SERPL-SCNC: 139 MEQ/L (ref 136–145)
T WAVE AXIS: 100
TIBC SERPL-MCNC: 340 UG/DL (ref 270–460)
UIBC SERPL-MCNC: 310 UG/DL (ref 180–360)
VENTRICULAR RATE: 68
VIT B12 SERPL-MCNC: 1033 PG/ML (ref 180–914)
WBC # BLD AUTO: 8.43 K/UL (ref 3.8–10.5)

## 2024-08-25 PROCEDURE — 63600000 HC DRUGS/DETAIL CODE: Mod: JZ | Performed by: STUDENT IN AN ORGANIZED HEALTH CARE EDUCATION/TRAINING PROGRAM

## 2024-08-25 PROCEDURE — 80048 BASIC METABOLIC PNL TOTAL CA: CPT | Performed by: STUDENT IN AN ORGANIZED HEALTH CARE EDUCATION/TRAINING PROGRAM

## 2024-08-25 PROCEDURE — 82607 VITAMIN B-12: CPT | Performed by: STUDENT IN AN ORGANIZED HEALTH CARE EDUCATION/TRAINING PROGRAM

## 2024-08-25 PROCEDURE — 63600000 HC DRUGS/DETAIL CODE: Mod: JZ | Performed by: NURSE PRACTITIONER

## 2024-08-25 PROCEDURE — 82746 ASSAY OF FOLIC ACID SERUM: CPT | Performed by: STUDENT IN AN ORGANIZED HEALTH CARE EDUCATION/TRAINING PROGRAM

## 2024-08-25 PROCEDURE — 83540 ASSAY OF IRON: CPT | Performed by: STUDENT IN AN ORGANIZED HEALTH CARE EDUCATION/TRAINING PROGRAM

## 2024-08-25 PROCEDURE — 63700000 HC SELF-ADMINISTRABLE DRUG: Performed by: INTERNAL MEDICINE

## 2024-08-25 PROCEDURE — 83735 ASSAY OF MAGNESIUM: CPT | Performed by: STUDENT IN AN ORGANIZED HEALTH CARE EDUCATION/TRAINING PROGRAM

## 2024-08-25 PROCEDURE — 36415 COLL VENOUS BLD VENIPUNCTURE: CPT | Performed by: STUDENT IN AN ORGANIZED HEALTH CARE EDUCATION/TRAINING PROGRAM

## 2024-08-25 PROCEDURE — 21400000 HC ROOM AND CARE CCU/INTERMEDIATE

## 2024-08-25 PROCEDURE — 25800000 HC PHARMACY IV SOLUTIONS: Performed by: STUDENT IN AN ORGANIZED HEALTH CARE EDUCATION/TRAINING PROGRAM

## 2024-08-25 PROCEDURE — 85025 COMPLETE CBC W/AUTO DIFF WBC: CPT | Performed by: STUDENT IN AN ORGANIZED HEALTH CARE EDUCATION/TRAINING PROGRAM

## 2024-08-25 PROCEDURE — 63700000 HC SELF-ADMINISTRABLE DRUG: Performed by: STUDENT IN AN ORGANIZED HEALTH CARE EDUCATION/TRAINING PROGRAM

## 2024-08-25 PROCEDURE — 63700000 HC SELF-ADMINISTRABLE DRUG: Performed by: NURSE PRACTITIONER

## 2024-08-25 PROCEDURE — 99233 SBSQ HOSP IP/OBS HIGH 50: CPT | Performed by: STUDENT IN AN ORGANIZED HEALTH CARE EDUCATION/TRAINING PROGRAM

## 2024-08-25 PROCEDURE — 82728 ASSAY OF FERRITIN: CPT | Performed by: STUDENT IN AN ORGANIZED HEALTH CARE EDUCATION/TRAINING PROGRAM

## 2024-08-25 PROCEDURE — 93005 ELECTROCARDIOGRAM TRACING: CPT | Performed by: NURSE PRACTITIONER

## 2024-08-25 PROCEDURE — P9016 RBC LEUKOCYTES REDUCED: HCPCS

## 2024-08-25 PROCEDURE — 93010 ELECTROCARDIOGRAM REPORT: CPT | Performed by: INTERNAL MEDICINE

## 2024-08-25 RX ORDER — SODIUM CHLORIDE 9 MG/ML
5 INJECTION, SOLUTION INTRAVENOUS AS NEEDED
Status: ACTIVE | OUTPATIENT
Start: 2024-08-25 | End: 2024-08-26

## 2024-08-25 RX ADMIN — SODIUM CHLORIDE 125 MG: 9 INJECTION, SOLUTION INTRAVENOUS at 17:30

## 2024-08-25 RX ADMIN — CLOPIDOGREL 75 MG: 75 TABLET ORAL at 08:42

## 2024-08-25 RX ADMIN — GLYCERIN 1 DROP: .002; .002; .01 SOLUTION/ DROPS OPHTHALMIC at 21:30

## 2024-08-25 RX ADMIN — GABAPENTIN 200 MG: 100 CAPSULE ORAL at 08:43

## 2024-08-25 RX ADMIN — ONDANSETRON 4 MG: 2 INJECTION INTRAMUSCULAR; INTRAVENOUS at 23:39

## 2024-08-25 RX ADMIN — ACETAMINOPHEN 975 MG: 325 TABLET ORAL at 23:39

## 2024-08-25 RX ADMIN — SILDENAFIL 20 MG: 20 TABLET, FILM COATED ORAL at 13:10

## 2024-08-25 RX ADMIN — WHITE PETROLATUM: 1.75 OINTMENT TOPICAL at 21:30

## 2024-08-25 RX ADMIN — GLYCERIN 1 DROP: .002; .002; .01 SOLUTION/ DROPS OPHTHALMIC at 13:10

## 2024-08-25 RX ADMIN — LIDOCAINE 4% 1 PATCH: 40 PATCH TOPICAL at 08:43

## 2024-08-25 RX ADMIN — LIDOCAINE 4% 1 PATCH: 40 PATCH TOPICAL at 08:44

## 2024-08-25 RX ADMIN — LEVOTHYROXINE SODIUM 100 MCG: 0.1 TABLET ORAL at 06:11

## 2024-08-25 RX ADMIN — SILDENAFIL 20 MG: 20 TABLET, FILM COATED ORAL at 08:47

## 2024-08-25 RX ADMIN — HYDROMORPHONE HYDROCHLORIDE 2 MG: 2 TABLET ORAL at 02:20

## 2024-08-25 RX ADMIN — MINERAL OIL, WHITE PETROLATUM: .03; .94 OINTMENT OPHTHALMIC at 21:30

## 2024-08-25 RX ADMIN — CYANOCOBALAMIN TAB 1000 MCG 1000 MCG: 1000 TAB at 08:43

## 2024-08-25 RX ADMIN — AMLODIPINE BESYLATE 10 MG: 10 TABLET ORAL at 08:41

## 2024-08-25 RX ADMIN — ONDANSETRON 4 MG: 2 INJECTION INTRAMUSCULAR; INTRAVENOUS at 17:31

## 2024-08-25 RX ADMIN — ONDANSETRON 4 MG: 2 INJECTION INTRAMUSCULAR; INTRAVENOUS at 11:39

## 2024-08-25 RX ADMIN — PANTOPRAZOLE SODIUM 40 MG: 40 TABLET, DELAYED RELEASE ORAL at 08:46

## 2024-08-25 RX ADMIN — ACETAMINOPHEN 975 MG: 325 TABLET ORAL at 06:10

## 2024-08-25 RX ADMIN — METHYLNALTREXONE BROMIDE 4 MG: 12 INJECTION, SOLUTION SUBCUTANEOUS at 08:48

## 2024-08-25 RX ADMIN — SALINE NASAL SPRAY 2 SPRAY: 1.5 SOLUTION NASAL at 08:48

## 2024-08-25 RX ADMIN — DEXTRAN 70, GLYCERIN, HYPROMELLOSE 1 DROP: 1; 2; 3 SOLUTION/ DROPS OPHTHALMIC at 08:45

## 2024-08-25 RX ADMIN — GLYCERIN 1 DROP: .002; .002; .01 SOLUTION/ DROPS OPHTHALMIC at 08:41

## 2024-08-25 RX ADMIN — SILDENAFIL 20 MG: 20 TABLET, FILM COATED ORAL at 21:29

## 2024-08-25 RX ADMIN — GABAPENTIN 200 MG: 100 CAPSULE ORAL at 21:29

## 2024-08-25 RX ADMIN — ACETAMINOPHEN 975 MG: 325 TABLET ORAL at 11:36

## 2024-08-25 RX ADMIN — GLYCERIN 1 DROP: .002; .002; .01 SOLUTION/ DROPS OPHTHALMIC at 06:11

## 2024-08-25 RX ADMIN — WHITE PETROLATUM: 1.75 OINTMENT TOPICAL at 08:48

## 2024-08-25 RX ADMIN — ONDANSETRON 4 MG: 2 INJECTION INTRAMUSCULAR; INTRAVENOUS at 06:10

## 2024-08-25 RX ADMIN — ONDANSETRON 4 MG: 2 INJECTION INTRAMUSCULAR; INTRAVENOUS at 01:40

## 2024-08-25 RX ADMIN — GABAPENTIN 200 MG: 100 CAPSULE ORAL at 13:11

## 2024-08-25 ASSESSMENT — COGNITIVE AND FUNCTIONAL STATUS - GENERAL
MOVING TO AND FROM BED TO CHAIR: 2 - A LOT
CLIMB 3 TO 5 STEPS WITH RAILING: 3 - A LITTLE
STANDING UP FROM CHAIR USING ARMS: 3 - A LITTLE
WALKING IN HOSPITAL ROOM: 2 - A LOT

## 2024-08-25 NOTE — NURSING NOTE
Assess complete, aao x3 loose stool in br this am, weight 122 standing , 2l nc sat 95 , iv team paged for am labs unsuccess attempt this am, c/o abd tenderness, kpad and lido patches given will monitor dr medina in

## 2024-08-25 NOTE — NURSING NOTE
Pt pleasant and cooperative with care. Took all medications as ordered. Complained of abdominal pain, dilaudid PRN given x1. Pt OOB x3 to BR, had three large loose stools this shift. VSS. Call bell within reach.

## 2024-08-25 NOTE — PROGRESS NOTES
Hospital Medicine Service -  Daily Progress Note       SUBJECTIVE   Interval History: Patient seen at the bedside this morning. She reports 3 large, loose stools overnight and 2 episodes of vomiting. No further vomiting so far this morning. She reports ongoing abdominal pain and L foot pain. Chest pain continues to improve. No bleeding from any site. Vitals remain stable.     OBJECTIVE      Vital signs in last 24 hours:  Temp:  [36.6 °C (97.8 °F)-36.9 °C (98.5 °F)] 36.8 °C (98.3 °F)  Heart Rate:  [65-97] 88  Resp:  [16] 16  BP: (103-131)/(54-63) 126/58    Intake/Output Summary (Last 24 hours) at 8/25/2024 1439  Last data filed at 8/25/2024 0536  Gross per 24 hour   Intake 660 ml   Output 150 ml   Net 510 ml       PHYSICAL EXAMINATION        GEN: Chronically ill-appearing, Frail, Comfortable, No acute distress, Alert and oriented x3  HEENT: PERRL, EOMI, Moist mucous membranes, Neck supple, No JVD  CV: Regular rate and rhythm, Normal S1/S2, No murmurs, No rubs, Chest pain on palpation  PULM:  Non-labored breathing on 2L NC, Clear to auscultation bilaterally, No crackles or rhonchi  ABD: Soft, Generally tender, Moderately distended, Bowel sounds present   EXT: Chronic hand deformities due to systemic sclerosis, L ankle pain on palpation, Dry gangrene on various spots of both feet  DERM: Chronic skin changes due to systemic sclerosis  NEURO: CN II-XII grossly intact, No focal motor or sensory deficits  : No reed catheter  PSYCH: Appropriate mood and affect, Full range       LINES, CATHETERS, DRAINS, AIRWAYS, AND WOUNDS   Lines, Drains, and Airways:   Peripheral IV (Adult) 08/21/24 Anterior;Upper;Left Arm (Active)   Number of days: 4       Wound Anterior;Right Toe (2nd) (Active)   Number of days: 17       Wound Anterior;Left Toe (Great) (Active)   Number of days: 17       Wound Anterior;Left Toe (2nd) (Active)   Number of days: 17       Wound Arterial Ulcer Anterior;Right Ankle (Active)   Number of days: 5        Wound Arterial Ulcer Anterior;Left Ankle (Active)   Number of days: 5       Wound Anterior;Left Toe (5th) (Active)   Number of days: 5       Surgical Incision Groin Right (Active)   Number of days: 11       Wounds (agree with documentation and present on admission)     LABS / IMAGING / TELE      Labs  Lab Results   Component Value Date    GLUCOSE 109 (H) 08/25/2024    CALCIUM 8.7 08/25/2024     08/25/2024    K 4.7 08/25/2024    CO2 27 08/25/2024     (H) 08/25/2024    BUN 34 (H) 08/25/2024    CREATININE 1.5 (H) 08/25/2024       Lab Results   Component Value Date    WBC 8.43 08/25/2024    HGB 6.8 (LL) 08/25/2024    HCT 23.3 (L) 08/25/2024    MCV 83.8 08/25/2024     08/25/2024       Imaging  X-RAY ABDOMEN 1 VIEW    Result Date: 8/24/2024  CLINICAL HISTORY: Ileus follow-up PRIOR STUDY: Abdominal x-ray dated 8/23/2024 TECHNIQUE: AP abdomen and pelvis COMMENT:  There is a moderate to large amount of stool noted within the colon. No definite bowel dilatation.     IMPRESSION: See above.       Telemetry  Reviewed independently. No events.    ASSESSMENT AND PLAN        # Chronic pain -- Patient reports multiple areas of chronic pain with associated point tenderness on exam. She feels this pain is acutely worsened during this hospitalization. Imaging negative. Likely MSK in etiology. Pain has been very difficult to control due to either lack of improvement, intolerable side effects, or allergies. She has tried several regimens including IV fentanyl without improvement. PC and Pain Management following.  - Chest pain somewhat improved today, L ankle pain and abdominal pain persist  - Continue gabapentin and PO dilaudid today as she is finally having some relief on this regimen  - Scheduled outpatient appointment with Pain Management, Dr. Charles on 9/23/2024 at 1:30pm     # Nausea/Vomiting, Ileus, Constipation -- Patient with persistent and worsening nausea/vomiting this week along with progressive  constipation. Likely in the setting of heavy opiate use for pain control as above. KUB shows significant stool retention throughout the colon. Non-obstructive bowel gas pattern.  - Scheduled IV zofran, Consider addition of reglan or compazine in combination  - Methylnaltrexone every other day, PC approved  - S/p 1/2 dose bowel prep on 8/23 and again on 8/24 with good response  - Bisacodyl suppository TID, Mineral oil enema BID  - Consult GI if additional assistance is needed  - Overall, appears to be slowly improving     # GEORGE -- Cr up to 1.6 from baseline 1.1-1.2. Possibly prerenal iso emesis.  - Holding ethacrynic acid for now  - Avoid nephrotoxins, Renally dose meds     # Dry gangrene of bilateral toes, PAD -- S/p LLE balloon angiogram with Vascular Surgery on 8/14.  - Local wound care  - Continue clopidogrel daily, She is allergic to aspirin  - Continue statin     # R groin hematoma, Acute blood loss anemia -- Suffered as a complication of LLE angiogram for dry gangrene/PAD as above. No active extravasation on CTA. C/b drop in Hb to ~7. FA/B12 wnl. Ferritin 24, %sat 9.  - Clopidogrel resumed on 8/17  - Hb downtrended from 7.8-8.0 down to 6.8 today over the course of 5 days; She remains vitally stable  - 1u pRBC today and CTM Hb, Transfuse for goal >7  - If concerns for hemodynamic instability/active bleeding, would recommend repeat abdominal imaging to assess for internal bleed/hematoma enlargement  - IV iron x5 doses     # ILD, Pulm HTN -- Chronic on 2L NC. Cardiology following here.  - Continue home sildenafil, macitentan  - Hold ethacrynic acid iso GEORGE and persistent vomiting     # Pulmonary nodule -- S/p EBUS with path showing small focus of atypical glands. - Outpatient Pulmonology follow-up.     # Bilateral eye pain -- Seen by Optho, drops recommended.       VTE Assessment: Padua    VTE Prophylaxis:  Current anticoagulants:    None      Code Status: Full Code      Estimated Discharge Date: 8/27/2024    Disposition Planning: D/c home pending improvement in ileus and stabilization of Hb/Cr     Maddy Cope MD  8/25/2024

## 2024-08-25 NOTE — PLAN OF CARE
Problem: Fall Injury Risk  Goal: Absence of Fall and Fall-Related Injury  Outcome: Progressing  Intervention: Identify and Manage Contributors  Flowsheets (Taken 8/25/2024 1321)  Medication Review/Management: medications reviewed  Self-Care Promotion: independence encouraged     Problem: Skin Injury Risk Increased  Goal: Skin Health and Integrity  Outcome: Progressing  Intervention: Optimize Skin Protection  Flowsheets (Taken 8/25/2024 1321)  Activity Management: ambulated in room  Skin Protection: absorbent pad utilized  Head of Bed (HOB) Positioning: HOB at 60-90 degrees

## 2024-08-26 LAB
ANION GAP SERPL CALC-SCNC: 8 MEQ/L (ref 3–15)
ATRIAL RATE: 74
ATRIAL RATE: 82
BASOPHILS # BLD: 0.03 K/UL (ref 0.01–0.1)
BASOPHILS NFR BLD: 0.3 %
BNP SERPL-MCNC: 315 PG/ML
BUN SERPL-MCNC: 27 MG/DL (ref 7–25)
CALCIUM SERPL-MCNC: 9 MG/DL (ref 8.6–10.3)
CHLORIDE SERPL-SCNC: 108 MEQ/L (ref 98–107)
CO2 SERPL-SCNC: 24 MEQ/L (ref 21–31)
CREAT SERPL-MCNC: 1.3 MG/DL (ref 0.6–1.2)
DIFFERENTIAL METHOD BLD: ABNORMAL
EGFRCR SERPLBLD CKD-EPI 2021: 46.3 ML/MIN/1.73M*2
EOSINOPHIL # BLD: 0.3 K/UL (ref 0.04–0.36)
EOSINOPHIL NFR BLD: 3 %
ERYTHROCYTE [DISTWIDTH] IN BLOOD BY AUTOMATED COUNT: 20.9 % (ref 11.7–14.4)
GLUCOSE SERPL-MCNC: 83 MG/DL (ref 70–99)
HCT VFR BLD AUTO: 29.6 % (ref 35–45)
HGB BLD-MCNC: 8.8 G/DL (ref 11.8–15.7)
IMM GRANULOCYTES # BLD AUTO: 0.05 K/UL (ref 0–0.08)
IMM GRANULOCYTES NFR BLD AUTO: 0.5 %
LYMPHOCYTES # BLD: 1.31 K/UL (ref 1.2–3.5)
LYMPHOCYTES NFR BLD: 13.3 %
MAGNESIUM SERPL-MCNC: 2 MG/DL (ref 1.8–2.5)
MCH RBC QN AUTO: 25.7 PG (ref 28–33.2)
MCHC RBC AUTO-ENTMCNC: 29.7 G/DL (ref 32.2–35.5)
MCV RBC AUTO: 86.5 FL (ref 83–98)
MONOCYTES # BLD: 0.72 K/UL (ref 0.28–0.8)
MONOCYTES NFR BLD: 7.3 %
NEUTROPHILS # BLD: 7.46 K/UL (ref 1.7–7)
NEUTS SEG NFR BLD: 75.6 %
NRBC BLD-RTO: 0 %
P AXIS: 45
P AXIS: 46
PDW BLD AUTO: 11.1 FL (ref 9.4–12.3)
PLATELET # BLD AUTO: 269 K/UL (ref 150–369)
POTASSIUM SERPL-SCNC: 4.8 MEQ/L (ref 3.5–5.1)
PR INTERVAL: 144
PR INTERVAL: 146
QRS DURATION: 78
QRS DURATION: 86
QT INTERVAL: 396
QT INTERVAL: 400
QTC CALCULATION(BAZETT): 439
QTC CALCULATION(BAZETT): 467
R AXIS: -1
R AXIS: 5
RBC # BLD AUTO: 3.42 M/UL (ref 3.93–5.22)
SODIUM SERPL-SCNC: 140 MEQ/L (ref 136–145)
T WAVE AXIS: 106
T WAVE AXIS: 110
VENTRICULAR RATE: 74
VENTRICULAR RATE: 82
WBC # BLD AUTO: 9.87 K/UL (ref 3.8–10.5)

## 2024-08-26 PROCEDURE — 63700000 HC SELF-ADMINISTRABLE DRUG: Performed by: NURSE PRACTITIONER

## 2024-08-26 PROCEDURE — 63700000 HC SELF-ADMINISTRABLE DRUG: Performed by: INTERNAL MEDICINE

## 2024-08-26 PROCEDURE — 99233 SBSQ HOSP IP/OBS HIGH 50: CPT | Performed by: INTERNAL MEDICINE

## 2024-08-26 PROCEDURE — 93010 ELECTROCARDIOGRAM REPORT: CPT | Performed by: INTERNAL MEDICINE

## 2024-08-26 PROCEDURE — 83880 ASSAY OF NATRIURETIC PEPTIDE: CPT | Performed by: INTERNAL MEDICINE

## 2024-08-26 PROCEDURE — 21400000 HC ROOM AND CARE CCU/INTERMEDIATE

## 2024-08-26 PROCEDURE — 93005 ELECTROCARDIOGRAM TRACING: CPT | Performed by: NURSE PRACTITIONER

## 2024-08-26 PROCEDURE — 93010 ELECTROCARDIOGRAM REPORT: CPT | Mod: 77 | Performed by: INTERNAL MEDICINE

## 2024-08-26 PROCEDURE — 25800000 HC PHARMACY IV SOLUTIONS: Performed by: STUDENT IN AN ORGANIZED HEALTH CARE EDUCATION/TRAINING PROGRAM

## 2024-08-26 PROCEDURE — 63700000 HC SELF-ADMINISTRABLE DRUG: Performed by: STUDENT IN AN ORGANIZED HEALTH CARE EDUCATION/TRAINING PROGRAM

## 2024-08-26 PROCEDURE — 83735 ASSAY OF MAGNESIUM: CPT | Performed by: STUDENT IN AN ORGANIZED HEALTH CARE EDUCATION/TRAINING PROGRAM

## 2024-08-26 PROCEDURE — 85025 COMPLETE CBC W/AUTO DIFF WBC: CPT | Performed by: STUDENT IN AN ORGANIZED HEALTH CARE EDUCATION/TRAINING PROGRAM

## 2024-08-26 PROCEDURE — 36415 COLL VENOUS BLD VENIPUNCTURE: CPT | Performed by: STUDENT IN AN ORGANIZED HEALTH CARE EDUCATION/TRAINING PROGRAM

## 2024-08-26 PROCEDURE — 99232 SBSQ HOSP IP/OBS MODERATE 35: CPT | Performed by: INTERNAL MEDICINE

## 2024-08-26 PROCEDURE — 63600000 HC DRUGS/DETAIL CODE: Mod: JZ | Performed by: NURSE PRACTITIONER

## 2024-08-26 PROCEDURE — 63600000 HC DRUGS/DETAIL CODE: Mod: JZ | Performed by: STUDENT IN AN ORGANIZED HEALTH CARE EDUCATION/TRAINING PROGRAM

## 2024-08-26 PROCEDURE — 80048 BASIC METABOLIC PNL TOTAL CA: CPT | Performed by: STUDENT IN AN ORGANIZED HEALTH CARE EDUCATION/TRAINING PROGRAM

## 2024-08-26 RX ADMIN — LIDOCAINE 4% 1 PATCH: 40 PATCH TOPICAL at 09:22

## 2024-08-26 RX ADMIN — CLOPIDOGREL 75 MG: 75 TABLET ORAL at 09:21

## 2024-08-26 RX ADMIN — ACETAMINOPHEN 975 MG: 325 TABLET ORAL at 16:55

## 2024-08-26 RX ADMIN — GABAPENTIN 200 MG: 100 CAPSULE ORAL at 21:04

## 2024-08-26 RX ADMIN — AMLODIPINE BESYLATE 10 MG: 10 TABLET ORAL at 09:22

## 2024-08-26 RX ADMIN — SALINE NASAL SPRAY 2 SPRAY: 1.5 SOLUTION NASAL at 09:23

## 2024-08-26 RX ADMIN — HYDROMORPHONE HYDROCHLORIDE 2 MG: 2 TABLET ORAL at 03:38

## 2024-08-26 RX ADMIN — SILDENAFIL 20 MG: 20 TABLET, FILM COATED ORAL at 21:04

## 2024-08-26 RX ADMIN — CLOTRIMAZOLE 1 APPLICATORFUL: 2 CREAM VAGINAL at 21:11

## 2024-08-26 RX ADMIN — GLYCERIN 1 DROP: .002; .002; .01 SOLUTION/ DROPS OPHTHALMIC at 09:23

## 2024-08-26 RX ADMIN — SILDENAFIL 20 MG: 20 TABLET, FILM COATED ORAL at 16:56

## 2024-08-26 RX ADMIN — ONDANSETRON 4 MG: 2 INJECTION INTRAMUSCULAR; INTRAVENOUS at 06:26

## 2024-08-26 RX ADMIN — ONDANSETRON 4 MG: 2 INJECTION INTRAMUSCULAR; INTRAVENOUS at 16:55

## 2024-08-26 RX ADMIN — WHITE PETROLATUM: 1.75 OINTMENT TOPICAL at 21:05

## 2024-08-26 RX ADMIN — ACETAMINOPHEN 975 MG: 325 TABLET ORAL at 06:25

## 2024-08-26 RX ADMIN — ONDANSETRON 4 MG: 2 INJECTION INTRAMUSCULAR; INTRAVENOUS at 11:51

## 2024-08-26 RX ADMIN — CYANOCOBALAMIN TAB 1000 MCG 1000 MCG: 1000 TAB at 09:21

## 2024-08-26 RX ADMIN — GABAPENTIN 200 MG: 100 CAPSULE ORAL at 16:56

## 2024-08-26 RX ADMIN — WHITE PETROLATUM: 1.75 OINTMENT TOPICAL at 09:23

## 2024-08-26 RX ADMIN — SODIUM CHLORIDE 125 MG: 9 INJECTION, SOLUTION INTRAVENOUS at 09:26

## 2024-08-26 RX ADMIN — PANTOPRAZOLE SODIUM 40 MG: 40 TABLET, DELAYED RELEASE ORAL at 09:22

## 2024-08-26 RX ADMIN — ACETAMINOPHEN 975 MG: 325 TABLET ORAL at 11:51

## 2024-08-26 RX ADMIN — LEVOTHYROXINE SODIUM 100 MCG: 0.1 TABLET ORAL at 06:25

## 2024-08-26 RX ADMIN — GABAPENTIN 200 MG: 100 CAPSULE ORAL at 09:22

## 2024-08-26 RX ADMIN — SILDENAFIL 20 MG: 20 TABLET, FILM COATED ORAL at 09:21

## 2024-08-26 ASSESSMENT — COGNITIVE AND FUNCTIONAL STATUS - GENERAL
MOVING TO AND FROM BED TO CHAIR: 2 - A LOT
WALKING IN HOSPITAL ROOM: 2 - A LOT
STANDING UP FROM CHAIR USING ARMS: 3 - A LITTLE
CLIMB 3 TO 5 STEPS WITH RAILING: 3 - A LITTLE

## 2024-08-26 NOTE — PROGRESS NOTES
Hospital Medicine Service -  Daily Progress Note       SUBJECTIVE   Interval History: Patient seen and examined at bedside, she complains of severe lower extremity pain bilaterally at her gangrene site.  Appears to have some shortness of breath, denies any chest pain   OBJECTIVE      Vital signs in last 24 hours:  Temp:  [36.5 °C (97.7 °F)-37 °C (98.6 °F)] 36.7 °C (98 °F)  Heart Rate:  [] 29  Resp:  [16-20] 20  BP: (108-137)/(54-63) 126/60    Intake/Output Summary (Last 24 hours) at 8/26/2024 1452  Last data filed at 8/26/2024 1000  Gross per 24 hour   Intake 740.33 ml   Output 301 ml   Net 439.33 ml       PHYSICAL EXAMINATION        Physical Exam  General:chronically ill looking, on 2l n/c  HEENT: Symmetric, PERRL/EOMI, moist membranes, NCAT  Cardiovascular: Regular rate and rhythm, normal S1/S2, no murmurs, rubs, gallops, no JVD  Pulmonary: Clear to auscultation bilaterally, no wheezing, rhonchi  GI: Soft, nontender, nondistended, bowel sounds normal, no organomegaly  Musculoskeletal: Normal bulk and tone, normal ROM  SKIN: scarring, hyper/hypopigmentation on face, scalp, upper and lower extremities, has missing digits in in bilateral hand fingers, and gangrene toes bilateral LE  Extremities: Distal pulses intact, No LE edema bilaterally  Neuro: CN2-12 intact, sensation intact, with no motor deficits  Psych: Sad mood and affect        LINES, CATHETERS, DRAINS, AIRWAYS, AND WOUNDS   Lines, Drains, and Airways:   Peripheral IV (Adult) 08/21/24 Anterior;Upper;Left Arm (Active)   Number of days: 4       Wound Anterior;Right Toe (2nd) (Active)   Number of days: 17       Wound Anterior;Left Toe (Great) (Active)   Number of days: 17       Wound Anterior;Left Toe (2nd) (Active)   Number of days: 17       Wound Arterial Ulcer Anterior;Right Ankle (Active)   Number of days: 5       Wound Arterial Ulcer Anterior;Left Ankle (Active)   Number of days: 5       Wound Anterior;Left Toe (5th) (Active)   Number of days: 5        Surgical Incision Groin Right (Active)   Number of days: 11       Wounds (agree with documentation and present on admission)     LABS / IMAGING / TELE      Labs  Lab Results   Component Value Date    GLUCOSE 83 08/26/2024    CALCIUM 9.0 08/26/2024     08/26/2024    K 4.8 08/26/2024    CO2 24 08/26/2024     (H) 08/26/2024    BUN 27 (H) 08/26/2024    CREATININE 1.3 (H) 08/26/2024       Lab Results   Component Value Date    WBC 9.87 08/26/2024    HGB 8.8 (L) 08/26/2024    HCT 29.6 (L) 08/26/2024    MCV 86.5 08/26/2024     08/26/2024       Imaging  No results found.     Telemetry  Reviewed independently. No events.    ASSESSMENT AND PLAN        # Chronic pain -- Patient reports multiple areas of chronic pain with associated point tenderness on exam. She feels this pain is acutely worsened during this hospitalization. Imaging negative. Likely MSK in etiology. Pain has been very difficult to control due to either lack of improvement, intolerable side effects, or allergies. She has tried several regimens including IV fentanyl without improvement. PC and Pain Management following.  -Has no chest pain today  -Complains severely of bilateral lower extremity pain states left leg pain is worse than right  -Will continue gabapentin and Dilaudid  - Scheduled outpatient appointment with Pain Management, Dr. Charles on 9/23/2024 at 1:30pm     # Nausea/Vomiting, Ileus, Constipation -- Patient with persistent and worsening nausea/vomiting this week along with progressive constipation. Likely in the setting of heavy opiate use for pain control as above. KUB shows significant stool retention throughout the colon. Non-obstructive bowel gas pattern.  - Scheduled IV zofran, Consider addition of reglan or compazine in combination  - Methylnaltrexone every other day, PC approved  - S/p 1/2 dose bowel prep on 8/23 and again on 8/24 with good response  - Bisacodyl suppository TID, Mineral oil enema BID  - Consult GI  if additional assistance is needed  - Overall, appears to be slowly improving     # GEORGE -- Cr up to 1.6 from baseline 1.1-1.2. Possibly prerenal iso emesis.  - Holding ethacrynic acid for now  - Avoid nephrotoxins, Renally dose meds     # Dry gangrene of bilateral toes, PAD -- S/p LLE balloon angiogram with Vascular Surgery on 8/14.  - Local wound care  - Continue clopidogrel daily, She is allergic to aspirin  - Continue statin     # R groin hematoma, Acute blood loss anemia -- Suffered as a complication of LLE angiogram for dry gangrene/PAD as above. No active extravasation on CTA. C/b drop in Hb to ~7. FA/B12 wnl. Ferritin 24, %sat 9.  - Clopidogrel resumed on 8/17  - Hb downtrended to 6.8, given 1unit  - Today 8.8  - If concerns for hemodynamic instability/active bleeding, would recommend repeat abdominal imaging to assess for internal bleed/hematoma enlargement  - IV iron x5 doses     # ILD, Pulm HTN -- Chronic on 2L NC.   - Continue home sildenafil, macitentan  -Evaluated by cardiology today  -Resume ethacrynic acid  -BNP elevated to 315     # Pulmonary nodule -- S/p EBUS with path showing small focus of atypical glands. - Outpatient Pulmonology follow-up.     # Bilateral eye pain -- Seen by Optho, drops recommended.       VTE Assessment: Padua    VTE Prophylaxis:  Current anticoagulants:    None      Code Status: Full Code      Estimated Discharge Date: 8/28/2024   Disposition Planning: D/c home pending diuresis      Ba Figueroa MD  8/26/2024

## 2024-08-26 NOTE — PROGRESS NOTES
Nutrition Assessment    Recommendations  1: Continue current diet + FR per team   2: Ensure Plus BID            Clinical Course: Patient is a 63 y.o. female who was admitted on 8/8/2024 with a diagnosis of Lung nodule [R91.1]  Thoracic lymphadenopathy [R59.0]  Pulmonary edema, acute (CMS/HCC) [J81.0].     Past Medical History:   Diagnosis Date    At risk for falls     De Quervain's tenosynovitis     Essential (primary) hypertension     Follicular carcinoma of thyroid (CMS/HCC)     Gastro-esophageal reflux     Hand ulceration (CMS/HCC)     Hyperlipidemia, unspecified     Interstitial lung disease (CMS/HCC)     Leg ulcer (CMS/HCC)     Lung nodule     Lupus (CMS/HCC)     O2 dependent     On 2L    Osteomyelitis of hand (CMS/HCC)     Osteoporosis     Pulmonary hypertension (CMS/HCC)     PVD (peripheral vascular disease) (CMS/HCC)     Raynaud's disease     Severe    Reflux esophagitis     Scleroderma (CMS/HCC)     Screening mammogram for breast cancer 05/04/2021    BI-RADS category: 1 - Negative (care everywhere @ [place])    Vertigo     Wound, open, foot     bilateral,dressing change with medihoney and mupirocin ointment by homecare nurse     Past Surgical History:   Procedure Laterality Date    CARDIAC CATHETERIZATION      COLONOSCOPY      HERNIA REPAIR      THYROIDECTOMY         Reason for Assessment  Reason For Assessment: physician consult, per organizational policy (Cx - CHF diet edu, F/U)  Diagnosis:  (pulmonary edema)     [facility] Nutrition Screen Tool  Has patient lost weight without trying?: 0-->No  Has patient been eating poorly due to decreased appetite?: 0-->No  [facility] Nutrition Screen Score: 0     Nutrition/Diet History  Intake (%): 100%     Physical Findings  Last Bowel Movement: 08/25/24  Skin: other (see comments) (R toe wound, L toe wound, R ankle arterial ulcer, L ankle arterial ulcer, R groin incision.)       Nutrition Order  Nutrition Order: meets nutritional requirements  Nutrition Order Comments: Regular,  "2L  Current TF/TPN Regimen: No     Anthropometrics  Height: 167.6 cm (5' 6\")           Current Weight  Weight Method: Standing scale  Weight: 54.6 kg (120 lb 4.8 oz)     Ideal Body Weight (IBW)  Ideal Body Weight (IBW) (kg): 59.58  % Ideal Body Weight: 84.51            Body Mass Index (BMI)  BMI (Calculated): 19.4  BMI Assessment: BMI 18.5-24.9: normal                       Labs/Procedures/Meds  Lab Results Reviewed: reviewed, pertinent     Lab Results   Component Value Date    GLUCOSE 109 (H) 08/25/2024    CALCIUM 8.7 08/25/2024     08/25/2024    K 4.7 08/25/2024    CO2 27 08/25/2024     (H) 08/25/2024    BUN 34 (H) 08/25/2024    CREATININE 1.5 (H) 08/25/2024             Medications  Pertinent Medications Reviewed: reviewed, pertinent   Scheduled Meds:   acetaminophen  975 mg oral q6h SPENCER    amLODIPine  10 mg oral q AM    atorvastatin  40 mg oral Daily (6p)    [Provider Managed Hold] bisacodyL  10 mg rectal TID    clopidogreL  75 mg oral Daily    clotrimazole  1 Applicatorful vaginal Nightly    cyanocobalamin  1,000 mcg oral Daily    [Provider Managed Hold] ethacrynic acid  50 mg oral Daily    ferric gluconate (FERRLECIT) 125 mg in sodium chloride 0.9 % 100 mL IVPB  125 mg intravenous Daily    gabapentin  200 mg oral TID    levothyroxine  100 mcg oral Daily (6:30a)    lidocaine  1 patch Topical Daily    lidocaine  1 patch Topical Daily    lidocaine  1 patch Topical Daily    macitentan  10 mg oral Daily    methylnaltrexone  0.075 mg/kg subcutaneous Every other day    [Provider Managed Hold] mineral oil  133 mL rectal BID    ondansetron  4 mg intravenous q6h SPENCER    pantoprazole  40 mg oral Daily    artificial tears  1 drop Both Eyes q4h while awake    senna  2 tablet oral BID    sildenafiL (pulm.hypertension)  20 mg oral TID    sodium chloride  2 spray Each Nostril Daily    white petrolatum   Topical BID    white petrolatum-mineral oil   Both Eyes Nightly       Estimated/Assessed Needs  Additional " Documentation: Calorie Requirements (Group), Protein Requirements (Group)     Calorie Requirements  Estimated kCal Needs: Actual Body Weight  Estimated Calorie Need Method: kcal/kg  Calorie/kg Recommended: 30-35  Calorie Recommendations: 5727-0092 kcal                   Protein Requirements  Recommended Dosing Weight (Estimated Protein Needs): Actual Body Weight  Est Protein Requirement Amount (gms/kg): 1.5-->1.5 gm protein  Protein Recommendations: 75 g                         Fluid requirements: 1300 mL (25 ml/kg)      Dosing weight  50.8 kg (CBW)                                                           Clinical comments:    Pt seen for nutrition follow up. Seen at bedside this AM. Appetite limited over the past few days 2/2 GI sx. Pt with large stool burden with N/V. Received aggressive bowel regimen over the weekend. +Multiple BMs yesterday. Pt reports some improvement in sx. Notes she tolerated breakfast this AM (oatmeal + banana + Ensure Plus), endorses some light nausea at time of visit. Denies vomiting this AM. Receiving scheduled zofran for sx management. Per previous RD notes, pt with good appetite prior to onset of GI sx- hopeful appetite will continue to improve with continued improvement with constipation. Will continue to monitor.     Goals: PO intake >70% meals/ONS through f/u   Monitor: PO intake, diet order, lab values, weight trends, skin integrity, plan of care     Recommendations: See above     PES  Statement: PES Statement  Nutrition Diagnosis: Inadequate Energy Intake  Related To:: Decreased ability to consume sufficient energy  As Evidenced By:: BMI 18.08  Nutritional Needs Met?: No                                   Date: 08/26/24  Signature: BECCA Quinn

## 2024-08-26 NOTE — PLAN OF CARE
Problem: Adult Inpatient Plan of Care  Goal: Plan of Care Review  Outcome: Progressing  Flowsheets (Taken 8/26/2024 7991)  Progress: no change  Outcome Evaluation: pt aaox4 VSS, chronic abdominal pin controlled with scheduled tylenol, N/V continued, hematemesis MD aware, IV zofran continued, no complaints of diarrhea stool softeners held, SB assist OOB, pt complain ts of vertigo MD aware, bed in low position and alarmed, call bell within reach  Plan of Care Reviewed With: patient   Plan of Care Review  Plan of Care Reviewed With: patient  Progress: no change  Outcome Evaluation: pt aaox4 VSS, chronic abdominal pin controlled with scheduled tylenol, N/V continued, hematemesis MD aware, IV zofran continued, no complaints of diarrhea stool softeners held, SB assist OOB, pt complain ts of vertigo MD aware, bed in low position and alarmed, call bell within reach

## 2024-08-26 NOTE — PLAN OF CARE
See RD note for recs  Problem: Adult Inpatient Plan of Care  Goal: Plan of Care Review  Outcome: Progressing

## 2024-08-26 NOTE — PROGRESS NOTES
Cardiology - Pulmonary Hypertension Progress Note       Interval History:  Examined sitting up in bed, reports she is not feeling too good today, having chest pain, left big toe pain and BL foot pain, breathing was a bit worse yesterday. Reports having abdominal pain    Hgb 6.8 yesterday received 1 unit of blood yesterday.       Consult background from 8/9  Ms. Felix is a 63 y.o. female with a past medical history of Pulmonary HTN, HTN, Raynaud's Disease, Cutaneous Systemic Scleroderma (dx 1998), ILD, PE (3/2023). She is a patient of Dr. Nguyễn. She was previously followed by Dr. Jos Valdez at [facility]. Echocardiogram from 4/21/2022 showed LVEF: 55-60%, mild aortic valve thickening, pulmonary artery systolic pressure: 52 mmHg and trivial pericardial effusion. LHC and RHC (separate occasions) over the last 5-10 years which showed normal hemodynamics and normal coronaries. She had a RHC 9/02/2020 showing top-normal right sided filling pressures with mild pulmonary hypertension, top-normal PVR and normal pulmonary capillary wedge pressure. The cardiac index was normal, seen below.     On 6/27/2022, [place] [facility] [place]. EKG: NSR without ischemic changes. hsTrop: 12->16, d-dimer: 0.56, CXR showed cardiomegaly and diffuse interstitial prominence.     Dr. Nguyễn ordered echocardiogram, which was completed on 8/22/2022 and showed estimated RVSP = 50-55 mmHg, normal-sized LV with normal LV systolic function. Estimated EF 55- 60%. Wall motion grossly normal, mild concentric left ventricular hypertrophy, grade I LV diastolic dysfunction, probably normal RV size and function.    At her initial visit on 10/11/2022, she reported that she felt very short of breath with minimal activity most of the time. She reports that this started about 1 year prior and had progressively gotten worse. She described PND and bendopnea. She reported that she experienced ankle swelling, worsened over the past year and usually  worse towards the end of the night. She also reported some swelling in her abdomen. She reported daily palpations. I recommended a RHC which was done 11/28/2022 and showed: Normal right sided filling pressures. Mildly elevated left sided filling pressures. Precapillary pulmonary hypertension with mean PA 36 mmHg.    She was hospitalized on 3/19/2023 at [facility] for PE diagnosed on V/Q scan. She reports that she had presented with left arm pain and heaviness. She states that she was started on Apixaban. One week after her last office visit (4/6/2023), she presented to [facility] with chest pain, epistaxis and hemoptysis. Echocardiogram showed: left ventricular cavity size normal with mild left ventricular hypertrophy and preserved systolic function. Estimated EF 65%. Chest CTA showed no evidence of PE; Apixaban was discontinued.     She had a repeat echocardiogram (6/2023) which showed: Normal left ventricular cavity size and mild concentric left ventricular hypertrophy. Normal systolic function. EF: 60%. Normal right ventricular structure and function. Mild tricuspid valve regurgitation with estimated RVSP: 55 mmHg. Compared to previous study from 4/14/2023, estimated right ventricular systolic pressure were higher.    She underwent V/Q scan in September 2023, which showed intermediate-to-high probability of pulmonary embolic disease, as a result she completed CTA Chest PE which showed: no evidence for filling defect or vessel cutoff to suggest pulmonary embolism. Enlargement of the pulmonary arteries compatible with pulmonary arterial hypertension was seen.    She reports she started Macitentan around August or September 2023. She stated that she had not been checking her SpO2. She reports she did not notice any difference since starting it. She reported she had been feeling more shortness of breath at times with ADLs.     She presented to [facility] on 3/3/2024 with chest pressure, palpitations and nausea. She  was noted to have hypoxia and tachypnea at presentation. She was found to have moderate-to-large pericardial effusion without tamponade. She was started on Colchicine 0.6 mg BID on 3/4/2024 and when she did not improve symptomatically, Prednisone 20 mg daily was added on 3/5/2024. On 3/7/2024, she developed multiple episodes of diarrhea and Colchicine dose was decreased to 0.3 mg. Coronary CTA, done as part of the ischemic workup, showed moderate 2 vessel CAD. She was recommended to begin Atorvastatin 20 mg daily and Clopidogrel 75 mg daily. She initially refused those two medications, but eventually agreed to take Atorvastatin.      She was discharged on Colchicine 0.3 mg for at least 3 months and Prednisone 20 mg for a total two week course until 3/19/2024, then decrease the dose to 10 mg daily for 2 weeks until 4/2/2024, then decrease dose to 5 mg daily for 2 weeks until 4/16/2024. She reports she tapered off Prednisone as instructed and has continued to take Colchicine as prescribed. She completed an echocardiogram (4/10/2024) which showed: Small-to-moderate sized, circumferential pericardial effusion. No echocardiographic evidence for cardiac tamponade. Compared to previous study from 3/4/2024, no significant change. Pericardial effusion size was similar, estimated right atrial pressure lower.      She presented to [facility] (4/16/2024) with increased pleuritic chest pain. On presentation to the ER, she was noted to be hypertensive and tachycardic. Labs showed mildly elevated troponin, elevated BNP and leukocytosis. CT angiography of the chest did not show evidence of PE. It was read as large pericardial effusion, evidence of emphysema, evidence of pulmonary hypertension, 16 mm left lobe lung mass.       She presented again to [facility] on 5/21/2024 at the request of her Rheumatologist. During her visit there, she was noted to be tachycardic and with some shortness of breath. Her chest pain was improved from her prior  "admission. On arrival, O2 saturations were normal. ECG showed: Sinus Tachycardia (HR: 125 bpm). BNP >300, hsTrop 32 ->24. POCUS in the emergency department showed evidence of pericardial effusion with no tamponade physiology. CTA Chest did not show evidence of pulmonary embolism. There were bilateral changes radiographically concerning for volume overload. She received Methylpredinsolone 125 mg in ED. Of note, she was on Prednisone 10 mg daily at home. She was given IV diuresis. She was discharged on Furosemide 20 mg PO PRN daily for fluid weight gain / swelling.     She had brief hospitalization and was discharged on 7/23 for chest pain. She presented to the ED on 7/25/2024 for worsening of her chronic chest pain and 8/5/2024 for facial swelling and chronic chest pain.      She underwent bronchoscopic lung biopsy yesterday, 8/8/2024, with Dr. Niles Rodriguez. Pathology showed atypical glandular cells which may be consistent with adenocarcinoma in situ.     Post-procedure CXR showed increase pulmonary vascular congestion. She was admitted for optimization.            ---    Rheumatology: Dr. Trevizo  Pulmonology: Dr. Wasserman    Past Medical / Surgical History:  Pulmonary HTN, HTN, Raynaud's Disease, Cutaneous Systemic Scleroderma (dx 1998), ILD, PE (3/2023), Follicular Carcinoma of Thyroid, Tenosynovitis, de Quervain, h/o Hernia Repair, h/o Appendicitis, h/o Digit Amputation, H/o Total Thyroidectomy (Dr. Pacheco at [facility]; 4/2013)    Family & Social History: She is , she has two children. She has worked as an .     Tobacco: former 2005  EtOH: never   Illicits: never     Her brother has CAD with stent  1st cousin with SLE  Both parents had \"heart problems\"    Allergies:   Allergies   Allergen Reactions    Aspirin Shortness of breath     Other reaction(s): Unknown  \"stop breathing\"      Ibuprofen Shortness of breath     Stop breathing    Iodine Shortness of breath     Other reaction(s): Unknown    Iodine And " Iodide Containing Products Shortness of breath     ? rash    Morphine Shortness of breath      Reported wamrth of face, improved with benadryl and cold compresses after getting morphine as well as episode of (subjective ) dyspnea, and thought she passed out - did have wamrth and erythema of face with mild edema R>L cheek notably on exam.     Penicillins Shortness of breath     Other reaction(s): Unknown    Sulfa (Sulfonamide Antibiotics) Angioedema    Clindamycin     Hydrochlorothiazide      Other reaction(s): Abdominal Pain   Slow heart rate    Oxycodone      Other reaction(s): Vomiting    Sulfamethoxazole-Trimethoprim      Other reaction(s): Vomiting, Weakness     Latex Rash       Medications:   Current Facility-Administered Medications   Medication Dose Route Frequency Provider Last Rate Last Admin    acetaminophen (TYLENOL) tablet 975 mg  975 mg oral q6h SPENCER Simon Cano DO   975 mg at 08/26/24 0625    amLODIPine (NORVASC) tablet 10 mg  10 mg oral q AM Simon Cano DO   10 mg at 08/25/24 0841    atorvastatin (LIPITOR) tablet 40 mg  40 mg oral Daily (6p) Simon Cano DO   40 mg at 08/22/24 1733    benzocaine-menthol (CEPACOL/CHLORASEPTIC) lozenge 1 lozenge  1 lozenge Mouth/Throat q2h PRN Simon Cano DO   1 lozenge at 08/22/24 0841    benzonatate (TESSALON) capsule 100 mg  100 mg oral 3x daily PRN Simon Cano DO   100 mg at 08/22/24 0841    [Provider Managed Hold] bisacodyL (DULCOLAX) 10 mg suppository 10 mg  10 mg rectal TID Molly Hidalgo CRNP   10 mg at 08/24/24 2015    carboxymethylcellulose (REFRESH PLUS) 0.5 % ophthalmic dropperette 1 drop  1 drop Right Eye 3x daily PRN Simon Cano DO   1 drop at 08/20/24 0817    clopidogreL (PLAVIX) tablet 75 mg  75 mg oral Daily Simon Cano DO   75 mg at 08/25/24 0842    clotrimazole (LOTRIMIN) 2 % vaginal cream 1 Applicatorful  1 Applicatorful vaginal Nightly  Arleth Pastor CRNP        cyanocobalamin (VITAMIN B12) tablet 1,000 mcg  1,000 mcg oral Daily Simon Cano DO   1,000 mcg at 08/25/24 0843    cyclobenzaprine (FLEXERIL) tablet 5 mg  5 mg oral 3x daily PRN Simon Cano DO   5 mg at 08/22/24 1404    glucose chewable tablet 16-32 g of dextrose  16-32 g of dextrose oral PRN Simon Cano DO        Or    dextrose 40 % oral gel 15-30 g of dextrose  15-30 g of dextrose oral PRN Simon Cano DO        Or    glucagon (GLUCAGEN) injection 1 mg  1 mg intramuscular PRN Simon Cano DO        Or    dextrose 50 % in water (D50) injection 12.5 g  25 mL intravenous PRN Simon Cano DO        [Provider Managed Hold] ethacrynic acid (EDECRIN) tablet 50 mg  50 mg oral Daily Simon Cano DO   50 mg at 08/23/24 0814    famotidine (PEPCID) injection 20 mg  20 mg intravenous Daily PRN Molly Hidalgo CRNP        Or    famotidine (PEPCID) tablet 20 mg  20 mg oral Daily PRN Molly Hidalgo CRNP        ferric gluconate (FERRLECIT) 125 mg in sodium chloride 0.9 % 100 mL IVPB  125 mg intravenous Daily Maddy Cope MD   Stopped at 08/25/24 1830    gabapentin (NEURONTIN) capsule 200 mg  200 mg oral TID Maddy Cope MD   200 mg at 08/25/24 2129    honey (MEDIHONEY) 100 % topical paste   Topical Daily PRN Simon Cano DO   Given at 08/19/24 0830    HYDROmorphone (DILAUDID) tablet 2 mg  2 mg oral q4h PRN Molly Hidalgo CRNP   2 mg at 08/26/24 0338    levothyroxine (SYNTHROID) tablet 100 mcg  100 mcg oral Daily (6:30a) Simon Cano DO   100 mcg at 08/26/24 0625    lidocaine (ASPERCREME) 4 % topical patch 1 patch  1 patch Topical Daily Simon Cano DO   1 patch at 08/25/24 0843    lidocaine (ASPERCREME) 4 % topical patch 1 patch  1 patch Topical Daily Simon Cano DO   1 patch at 08/25/24 0844    lidocaine (ASPERCREME) 4 % topical patch 1  patch  1 patch Topical Daily Simon Cano DO   1 patch at 08/25/24 0843    macitentan (OPSUMIT) tablet 10 mg (Patient Owned)  10 mg oral Daily Simon Cano DO   10 mg at 08/25/24 1138    melatonin ODT 3 mg  3 mg oral Nightly PRN Simon Cano DO   3 mg at 08/21/24 2119    methylnaltrexone (RELISTOR) injection vial 4 mg  0.075 mg/kg subcutaneous Every other day Maddy Cope MD   4 mg at 08/25/24 0848    [Provider Managed Hold] mineral oil enema 133 mL  133 mL rectal BID Maddy Cope MD   133 mL at 08/24/24 1243    ondansetron (ZOFRAN) injection 4 mg  4 mg intravenous q6h SPENCER Molly Hidalgo CRNP   4 mg at 08/26/24 0626    pantoprazole (PROTONIX) tablet,delayed release (DR/EC) 40 mg  40 mg oral Daily Molly Hidalgo CRNP   40 mg at 08/25/24 0846    peg 400-hypromellose-glycerin (ARTIFICIAL TEARS) 1-0.2-0.2 % eye drops 1 drop  1 drop Both Eyes q4h while awake Simon Cano DO   1 drop at 08/25/24 2130    phenoL (SORE THROAT SPRAY) 1.4 % mucosal spray 1 spray  1 spray Mouth/Throat q2h PRN Simon Cano DO        senna (SENOKOT) tablet 2 tablet  2 tablet oral BID Maddy Cope MD   2 tablet at 08/24/24 2017    sildenafiL (pulm.hypertension) (REVATIO) tablet 20 mg  20 mg oral TID Simon Cano DO   20 mg at 08/25/24 2129    sodium chloride (OCEAN) 0.65 % nasal spray 2 spray  2 spray Each Nostril Daily Simon Cano DO   2 spray at 08/25/24 0848    sodium chloride 0.9 % infusion  5 mL/hr intravenous PRN Maddy Cope MD        white petrolatum (AQUAPHOR) ointment   Topical q4h PRN Simon Cano DO   Given at 08/13/24 2117    white petrolatum (AQUAPHOR) ointment   Topical BID Simon Cano DO   Given at 08/25/24 2130    white petrolatum-mineral oil (ARTIFICIAL TEARS OINT) ophthalmic ointment   Both Eyes Nightly Simon Cano DO   Given at 08/25/24 2130       Review of Systems:   All other  systems reviewed and are negative except as per HPI.    Physical Exam:     Wt Readings from Last 10 Encounters:   08/26/24 54.6 kg (120 lb 4.8 oz)   08/05/24 49.4 kg (109 lb)   08/05/24 49.4 kg (109 lb)   07/20/24 49.5 kg (109 lb 2 oz)   07/19/24 51.3 kg (113 lb)   06/20/24 51.7 kg (114 lb)   06/14/24 52.6 kg (116 lb)   05/23/24 52.4 kg (115 lb 9.6 oz)   04/17/24 53.5 kg (118 lb)   04/10/24 44.5 kg (98 lb)       BP Readings from Last 5 Encounters:   08/26/24 137/63   08/05/24 (!) 148/75   07/25/24 (!) 155/77   07/23/24 (!) 101/57   07/19/24 (!) 154/80       Vitals:    08/26/24 0830   BP: 137/63   Pulse: 91   Resp: 16   Temp: 36.6 °C (97.9 °F)   SpO2: 96%       Constitutional: NAD, AAOx3  HENT: Normocephalic, atraumatic head, sclerae anicteric, no cervical lymphadenopathy, trachea midline, facial swelling  Cardiovascular: RRR, no murmurs, rubs or gallops, JVP: 13 cm H2O   cm H2O, no carotid bruits.  Respiratory: CTA bilateral lung fields, no wheezes, rales or rhonchi  GI: soft, non-tender/non-distended  Musculoskeletal / Extremities: Bilateral hip edema noted, +2 pulses bilateral radial, DP/PT arteries, skin tightening on face and fingertips, ulceration and discoloration of 2nd phalange b/l LE.   Skin: no rashes. Facial changes c/w scleroderma  Neuro / Psych: no focal deficits, CNII-XII grossly intact, appropriate, cooperative    Diagnostic Data:   Results from last 7 days   Lab Units 08/25/24  1127 08/24/24  0951 08/23/24  1315   WBC K/uL 8.43 7.84 8.48   HEMOGLOBIN g/dL 6.8* 7.3* 7.4*   HEMATOCRIT % 23.3* 24.8* 25.4*   MCV fL 83.8 83.5 83.0   PLATELETS K/uL 265 233 222     Results from last 7 days   Lab Units 08/25/24  1127 08/24/24  0951 08/23/24  1315   SODIUM mEQ/L 139 139 136   POTASSIUM mEQ/L 4.7 4.8 4.6   CHLORIDE mEQ/L 108* 106 103   CO2 mEQ/L 27 28 28   BUN mg/dL 34* 41* 34*   CREATININE mg/dL 1.5* 1.6* 1.5*   CALCIUM mg/dL 8.7 9.0 8.8   GLUCOSE mg/dL 109* 125* 83   MAGNESIUM mg/dL 2.1 2.0 2.0        Component      Latest Ref Rng 7/20/2024 7/25/2024   Sodium      136 - 145 mEQ/L 142  139    Potassium, Bld      3.5 - 5.1 mEQ/L 4.2  4.3    Chloride      98 - 107 mEQ/L 107  103    CO2      21 - 31 mEQ/L 25  27    BUN      7 - 25 mg/dL 17  17    Creatinine      0.6 - 1.2 mg/dL 1.2  1.1    Glucose      70 - 99 mg/dL 85  86    Calcium      8.6 - 10.3 mg/dL 9.3  10.6 (H)    AST (SGOT)      13 - 39 IU/L  15    ALT (SGPT)      7 - 52 IU/L  8    Alkaline Phosphatase      34 - 125 IU/L  82    Total Protein      6.0 - 8.2 g/dL  8.8 (H)    Albumin      3.5 - 5.7 g/dL  4.3    Bilirubin, Total      0.3 - 1.2 mg/dL  0.4    eGFR      >=60.0 mL/min/1.73m*2 51.0 (L)  56.6 (L)    Anion Gap      3 - 15 mEQ/L 10  9       Component      Latest Ref Rng 7/20/2024 7/25/2024   WBC      3.80 - 10.50 K/uL 7.27  10.29    Hemoglobin      11.8 - 15.7 g/dL 9.8 (L)  11.2 (L)    Hematocrit      35.0 - 45.0 % 32.0 (L)  37.3    MCV      83.0 - 98.0 fL 80.4 (L)  80.4 (L)    Platelets      150 - 369 K/uL 206  331       Component      Latest Ref Rng 7/19/2024 7/25/2024 8/9/2024   High Sens Troponin I      <15.0 pg/mL 89.2 (HH)  66.8 (HH)  92.5 (HH)    High Sens Troponin I       90.3 (HH)  83.4 (HH)         Component      Latest Ref Rng 5/22/2024 6/22/2024 7/19/2024 7/25/2024   BNP      <=100 pg/mL 352 (H)  150 (H)  151 (H)  220 (H)       Component      Latest Ref Rng 4/16/2024 5/23/2024 7/21/2024 7/22/2024   Sed Rate      0 - 30 mm/hr 101 (H)  79 (H)  105 (H)  >119 (H)       Component      Latest Ref Rng 4/18/2024 5/24/2024   WBC      3.80 - 10.50 K/uL 8.40  16.22 (H)    RBC      3.93 - 5.22 M/uL 3.54 (L)  3.82 (L)    Hemoglobin      11.8 - 15.7 g/dL 9.8 (L)  10.3 (L)    Hematocrit      35.0 - 45.0 % 32.8 (L)  33.9 (L)    Platelets      150 - 369 K/uL 254  289       Component      Latest Ref Rng 5/24/2024   Magnesium      1.8 - 2.5 mg/dL 2.1      Component      Latest Ref Rng 4/13/2023 3/7/2024 5/22/2024   TSH      0.34 - 5.60 mIU/L 4.71  0.28  (L)  1.03      Component      Latest Ref Rng 5/24/2024   Sodium      136 - 145 mEQ/L 138    Potassium, Bld      3.5 - 5.1 mEQ/L 4.4    Chloride      98 - 107 mEQ/L 104    CO2      21 - 31 mEQ/L 24    BUN      7 - 25 mg/dL 40 (H)    Creatinine      0.6 - 1.2 mg/dL 1.1    Glucose      70 - 99 mg/dL 87    Calcium      8.6 - 10.3 mg/dL 9.5    eGFR      >=60.0 mL/min/1.73m*2 56.6 (L)    Anion Gap      3 - 15 mEQ/L 10      Component      Latest Ref Rng 4/13/2023   Hemoglobin A1C      <5.7 % 4.4        Component      Latest Ref Rng 4/14/2023   Triglycerides      30 - 149 mg/dL 44    Cholesterol      <=200 mg/dL 194    HDL      >=55 mg/dL 49 (L)    LDL Calculated      <=100 mg/dL 136 (H)    Non-HDL, Calculated      mg/dL 145    RISK      <=5.0  4.0       Component      Latest Ref Rng 4/13/2023   High Sens Troponin I      <15.0 pg/mL 23.0 (H)       Component      Latest Ref Rng 4/14/2023   Ferritin      11 - 250 ng/mL 75      Component      Latest Ref Rng 4/14/2023   Iron      35 - 150 ug/dL 29 (L)    TIBC      270 - 460 ug/dL 245 (L)    UIBC      180 - 360 ug/dL 216    Iron Saturation      15 - 45 % 12 (L)        Component      Latest Ref Rng 6/27/2022 4/13/2023   BNP      <=100 pg/mL 111 (H)  105 (H)                          ---    ECG (8/2/2024, personally reviewed):   Sinus tachycardia   Left ventricular hypertrophy with repolarization abnormality    HR: 117 bpm     ---        Lower extremity arterial duplex and ENRRIQUE August 10, 2024:  Right ankle ENRRIQUE (DP): 0.74.  Right ankle ENRRIQUE (PT): 0.88.  Left ankle ENRRIQUE (DP): 0.90.  Left ankle ENRRIQUE (PT): 0.62.    Findings concerning for hemodynamically significant stenosis within the  bilateral popliteal arteries and distal left femoral artery. There are also  findings which can be seen with inflow stenosis at the level left common femoral  artery. CTA of the abdomen and pelvis with lower extremity runoff could be  performed for further evaluation if clinically indicated.    CT Chest /  Abdomen / Pelvis w/ Contrast (7/25/2024, personally reviewed):     Lung bases: Left-sided pleural effusion, left parenchymal consolidation, and  findings of interstitial lung disease.  More nodular density in the medial  aspect of the left lower lobe measuring 1.6 x 1.9 cm.  Cardiomegaly with a  pericardial effusion.     Liver: The liver is normal in size.  There is no focal mass.  Hepatic veins and  portal veins are patent without focal filling defects to suggest thrombosis..     Gallbladder:  Small dependent gallstones.  No gallbladder wall thickening..     Spleen: Spleen is normal in size without focal mass lesion..     Pancreas: The pancreas is normal without focal mass, parenchymal atrophy, or  pancreatic duct dilation..     Adrenals: The adrenals are normal bilaterally without focal mass..     Kidneys, ureters and bladder:  Symmetric enhancement and excretion..  Left lower  pole cystic lesion measures 3.2 cm in maximal dimension.  This measures greater  than simple fluid density.  This measured 60 Hounsfield units on the noncontrast  CT from 2020.  It measures 36 Hounsfield units on today's study.  Therefore it  is most in keeping with a hemorrhagic/proteinaceous cyst.  Additional  hypodensities too small to characterize.     Retroperitoneal structures: There is no abdominal aortic aneurysm.  Atherosclerotic calcification.  There is no retroperitoneal adenopathy or  retroperitoneal mass..     Bowel and mesentery: No evidence of a focal inflammatory or obstructive process.  Moderate fecal retention throughout the colon.  There are a few fluid-filled  small bowel loops in the pelvis, nonspecific.     Lymph nodes: No pathologic lymphadenopathy..     Pelvis: The uterus is normal in size.  The ovaries are normal.  There is no  adnexal mass or pelvic free fluid.     Bones: Within normal limits.    No evidence for pulmonary embolism.  Persistent left-sided pleural effusion and airspace disease at the left  lung  base.  Underlying interstitial lung disease.     TTE (7/19/2024, personally reviewed):   Mild increased wall thickness with normal left ventricular cavity and preserved systolic function. EF 65%. No wall motion abnormalities.   Normal right ventricular size with preserved systolic function.   IVC normal in size with >50% respiratory variation consistent with normal right sided filling pressures.   Small pericardial effusion. No evidence of hemodynamic compromise with normal respiratory variation and no chamber collapse. Prominent epicardial fat.   Compared with previous study of 5/23/2024, small pericardial effusion persists.      PET/CT Skull-to-Thigh (6/4/2024):   An approximately 1.6 cm pulmonary nodule at the medial left lung base is  significantly FDG avid at max SUV 8.5.  This is nonspecific and can be seen in  the setting of benign pathology however malignancy is a concern.     There is more mild to moderate nonspecific uptake localizing to lita-fissural  nodules particularly on the left.     There is  moderate nonspecific left hilar and mediastinal dawson uptake.     No additional suspicious FDG avid findings appreciated.     CTA Chest (5/21/2024):   IMPRESSION:  1.  No evidence of acute pulmonary embolism.  2.  Multiple new perifissural left lower lobe nodules, suspicious for a  neoplastic process. PET/CT and or tissue sampling is recommended.  3.  Chronic interstitial lung disease in an NSIP pattern, with overall slightly  progressed appearance since March 2023. Pulmonary consultation is recommended.  4.  Stable cardiomegaly and large pericardial effusion.  5.  Mediastinal and supraclavicular adenopathy, similar to prior study, also  indeterminate, which could be further evaluated at time of PET/CT.     CTA Chest (4/16/2024):   IMPRESSION:  1. No evidence of pulmonary embolism.  2. Stable cardiomegaly and large pericardial effusion. Cardiology consultation  suggested.  3. Emphysema with bilateral  lower lobe groundglass opacity and bronchiectasis in  an NSIP pattern, which can be seen with scleroderma. Pulmonary consultation  suggested.  4. Stable 16 mm left lower lobe masslike density which may represent  atelectasis, lung cancer or lymphoma. When clinically appropriate PET/CT  suggested.  5. Stable prominence of the main pulmonary artery which can be seen with  pulmonary hypertension     TTE (5/23/2024, personally reviewed):  Mild increased wall thickness with normal left ventricular cavity and preserved systolic function. EF 65%. No wall motion abnormalities.   Normal right ventricular size with preserved systolic function.   Tiny IVC with >50% respiratory variation consistent with low-normal right sided filling pressures.   Small pericardial effusion, predominantly adjacent to right atrium. No evidence of hemodynamic compromise with normal respiratory variation and no chamber collapse. Prominent epicardial fat.   Compared with previous study of 4/10/24, small pericardial effusion persists.      TTE (4/10/2024, personally reviewed):  1. Normal left ventricular cavity size with mild concentric left ventricular hypertrophy. Normal systolic function. EF: 60%. No regional wall motion abnormalities. Cannot determine diastolic function. Global longitudinal strain not reported due to technical limitations of study.   2. Aortic valve has three cusps. Trace aortic regurgitation. Aortic valve and root sclerosis.  3. Thickened mitral valve leaflets. Trace mitral valve regurgitation. Normal left atrial size.   4. Normal right ventricular structure and function. Trace tricuspid valve regurgitation with estimated RVSP: 51 mmHg (assuming right atrial pressure of 3 mmHg). Normal right atrial size. Pulmonic valve structurally normal. Trace pulmonic valve regurgitation. Pulsed wave Doppler of the RVOT systolic profile shows decreased acceleration times and geometry consistent with mildly elevated PVR.  5. IVC size is normal  with > 50% inspiratory collapse consistent with normal right atrial pressure. Small-to-moderate sized, circumferential pericardial effusion. No echocardiographic evidence for cardiac tamponade.   6. Compared to previous study from 3/4/2024, no significant change. Pericardial effusion size is similar, estimated right atrial pressure lower.           CTA Abd / Pelvis (3/13/2024, personally reviewed):   There is dilatation of the of proximal/mid small bowel with relatively abrupt  transition left hemiabdomen, likely obstruction. Questionable lesion at the  transition point. This could be better evaluated with CT or MRI enterography.     Aorta and major branches patent. Superior mesenteric artery patent without  significant stenosis.  Suspect moderate stenosis of the origins of the of renal arteries bilaterally.     Large pericardial effusion, no change.     Patchy opacities lower lungs, similar to prior.      Coronary CTA (3/11/2024):   1. Two-vessel coronary artery disease as above that is moderate in severity.  CT  FFR is normal..  2. There is mitral annular calcification and aortic sclerosis.  The right atrium  is enlarged.  There is left ventricular hypertrophy.  There is a moderate to  large circumferential pericardial effusion.  3. Normal left ventricular wall motion and systolic function.  4. Coronary calcium score 313, 96th percentile     TTE (3/4/2024, personally reviewed):   Normal-sized left ventricle. Mild left ventricular hypertrophy. Preserved systolic function. LVEF 60%. No regional wall motion abnormalities. Grade II diastolic dysfunction.  Normal global longitudinal strain (-20%).  The aortic valve is not well seen.  Cannot determine the number of cusps.  Sclerotic leaflets.  No aortic stenosis.  Trace aortic insufficiency.  Normal sized aortic root.  The visualized portion of the ascending aorta is normal in size.  The mitral valve leaflets are thickened. Mild mitral annular calcification. Trace  mitral regurgitation.   Mildly dilated left atrium.  Normal-sized right ventricle. Normal right ventricular systolic function.  Thickened tricuspid valve. There is mild tricuspid regurgitation with estimated RVSP of 46 mmHg.   Pulmonic valve is not well visualized. Trace pulmonic regurgitation.   Mildly dilated right atrium.  IVC is enlarged with <50% respiratory variation consistent with elevated right atrial filling pressure (at least 15 mmHg).   Moderate, circumferential, circumferential pericardial effusion.  The largest pocket is located inferolaterally.  Elevated right atrial pressure.  No other clear echocardiographic features of increased pericardial pressure.  Correlate clinically.   Compared to previous TTE from 6/21/2023, pericardial effusion now moderate in size.  Right atrial pressure now elevated.         CTA Chest (3/3/2024):   IMPRESSION:  1.  No evidence of acute pulmonary embolism to the level of the segmental  branches.  2.  Moderate to large pericardial effusion measuring higher than simple fluid  density.  3.  Lower lobe lung field predominant interstitial thickening with some relative  subpleural sparing, consistent with a chronic interstitial lung disease,  unchanged from the prior study.  4.  Continued bilateral axillary, mediastinal, and bilateral hilar  lymphadenopathy, not definitely changed from the prior study, possibly related  to the patient's sarcoid.  5.  Emphysema.      CTA Chest PE (10/4/2023):  IMPRESSION:  1. No evidence for filling defect or vessel cutoff to suggest pulmonary  embolism.  Enlargement of the pulmonary arteries compatible with pulmonary  arterial hypertension.  2. Changes throughout the lungs as described above which can be seen with  systemic sclerosis/scleroderma.  Underlying pneumonia the lung bases cannot be  entirely excluded in the proper clinical setting.  3.  Additional stable findings as described above.        VQ (9/12/2023):  IMPRESSION:  Intermediate to  high probability of pulmonary embolic disease.     6MWT (7/25/2023, on Sildenafil 20 mg TID):        TTE (6/21/2023, personally reviewed):  1. Normal left ventricular cavity size and mild concentric left ventricular hypertrophy. Normal systolic function. EF: 60%. No regional wall motion abnormalities. Grade I diastolic dysfunction.   2. Aortic valve cusps not well visualized. Trace aortic regurgitation. Aortic valve and root sclerosis.  3. Thickened mitral valve leaflets. Mild mitral annular calcification. Trace mitral regurgitation. Mildly dilated left atrial size.   4. Normal right ventricular structure and function. Mild tricuspid valve regurgitation with estimated RVSP: 55 mmHg (assuming right atrial pressure of 3 mmHg). Normal right atrial size. Pulmonic valve not well visualized. Trace pulmonic valve regurgitation. Pulsed wave Doppler of the RVOT systolic profile show decreased acceleration times ( msec) and late systolic notching consistent with elevated PVR.   5. IVC size is normal with > 50% inspiratory collapse consistent with normal right atrial pressure. Small pericardial effusion without echocardiographic evidence of tamponade.  6. Compared to previous study from 4/14/2023, estimated right ventricular systolic pressure is higher.        TTE (4/14/2023, personally reviewed):   The left ventricular cavity size is normal with mild left ventricular hypertrophy and preserved systolic function. Estimated EF 65%. No regional wall motion abnormalities. Grade I diastolic dysfunction.     The aortic valve is tricuspid. Aortic valve sclerosis. Trace aortic regurgitation.      The visualized portions of the aortic root and ascending aorta are normal in size.     The mitral valve is mildly thickened. Mild mitral annular calcification. Trace mitral regurgitation.       The left atrium is severely dilated.      The right ventricle is normal in size with preserved systolic function     The pulmonic valve is not  well seen.     The tricuspid valve is normal in appearance. mild tricuspid regurgitation with an estimated RVSP of 34 mmHg.       Right atrium is normal in size.     The IVC is small and collapses > 50% with inspiration consistent with normal right atrial pressures.     Small pericardial effusion, mostly posterior.       Compared to previous echocardiogram from 8/22/2022, there is no significant change        TTE (11/28/2022, personally reviewed):   Normal right- and mildly elevated left-sided filling pressures (RA: 3 mmHg, PCWP: 13 mmHg)  Elevated pulmonary artery pressures (60/22(35 mmHg)) in the setting of PCWP: 13 mmHg.   Normal cardiac output and index (CO: 5.1 L/min, CI: 3.2 L/min/m2)  PVR: 4.3 Wood units. SVR: 1840 dynes/sec/cm5      TTE (8/22/2022, personally reviewed):  Normal-sized LV. Normal LV systolic function. Estimated EF 55- 60%. Wall motion appears grossly normal. Mild concentric left ventricular hypertrophy. Grade I LV diastolic dysfunction.  Pulmonic valve not well visualized. Grossly normal pulmonic valve structure. Trace pulmonic valve regurgitation. No pulmonic valve stenosis.  Aortic valve not well visualized. Aortic valve not well seen but likely trileaflet. Focal aortic valve calcification present. Sclerotic aortic valve leaflets. Mild aortic valve regurgitation. No aortic valve stenosis.  RV not well visualized. Normal-sized RV. Normal RV systolic function.  Normal-sized RA.  There is a small loculated pericardial effusion anterior to the right atrium. No cardiac tamponade.  Sclerotic mitral valve. Mitral valve calcification of the anterior leaflet present.  Trace mitral valve regurgitation.  No significant mitral valve stenosis.  Normal-sized IVC. IVC demonstrates normal respiratory collapse.  Tricuspid valve structure is grossly normal. Mild to moderate tricuspid valve regurgitation.  Estimated RVSP = 50-55 mmHg.  Mildly dilated LA.  No previous echocardiogram available for comparison.      TTE (4/21/2022, outside study):  This result has an attachment that is not available.     A complete transthoracic echocardiogram (including 2D, color flow   Doppler, spectral Doppler and M-mode imaging) was performed using the   standard protocol. The study quality was adequate.     The left ventricle is normal in size. There is moderately increased   wall thickness consistent with moderate concentric hypertrophy.   Endocardium is incompletely visualized, but no regional wall motion   abnormalities are noted in the visualized segments. Normal left   ventricular ejection fraction. The left ventricular ejection fraction by   visual estimate is 55% to 60%.     The right ventricle is normal in size. There is normal function of the   right ventricle.     The left atrium is normal in size. Left atrial volume is 58 mL.     The right atrial volume measures 52 mL. The right atrial volume index   measures 34 mL/m2.     There is trace mitral regurgitation. There is no mitral stenosis.     The aortic valve is trileaflet. There is mild aortic valve thickening.   There is no aortic stenosis. There is trace aortic regurgitation.     There is mild to moderate tricuspid regurgitation. Pulmonary artery   systolic pressure measures 52 mmHg. The pulmonary artery systolic pressure   is moderately elevated.     The aorta measures 3.2 cm at the sinus of Valsalva. The proximal   segment of the ascending aorta is mildly enlarged. The proximal segment of   the ascending aorta measures 3.4 cm. The proximal segment of the ascending   aorta measures 2.2 cm/m2, indexed for body surface area.     Trivial pericardial effusion present. There is no echocardiographic   evidence of cardiac tamponade.     The inferior vena cava is normal in size (diameter <21 mm) and   decreases >50% in size with inspiration, suggesting a normal right atrial   pressure of 3 mmHg (range 0-5 mm Hg).     Compared to the prior study of 3/13/2020, there are no  significant   changes.        PFT (3/23/2022):      PFT (7/14/2021):      CT Chest (5/2/2021, outside study):  CLINICAL INFORMATION: Systemic sclerosis. Interstitial lung disease. Groundglass nodule     PROCEDURE: Unenhanced CT of the chest was performed using thin section reconstructions. Images were obtained on inspiration and expiration.     COMPARISON: June and August 2020     FINDINGS:     LUNGS, PLEURA:     Groundglass opacities with reticulation with subpleural sparing in the lower lobes, without honeycombing or architectural distortion, unchanged.     Right upper lobe groundglass nodule, image 114 of series 3, 8 x 11 mm, previously 6 mm.     Expiratory images are of diagnostic quality and do not demonstrate evidence of clinically significant air trapping.     CARDIOVASCULAR, MEDIASTINUM, THYROID: Coronary calcifications. Trace pericardial effusion.     LYMPH NODES: Subcentimeter mediastinal lymph nodes, unchanged. Unchanged axillary lymph nodes.     SKELETON, CHEST WALL: No destructive bone lesion     UPPER ABDOMEN: Patulous esophagus. 3 mm right renal stone.       RHC (9/02/2020):  RA   8 mmHg   RV   40/5 mmHg   PA (mean)  44/16 (25) mmHg   PCWP   12 mmHg   CO   4.65 lpm (Thermodilution)   CI   2.65 lpm/m-squared   SVI    33 ml/beat/m-squared (lower limit normal 29)   PVR   2.8 mmHg/l/min (Wood units)   SVR   2064 dyne-sec-cm-5         PFT (3/18/2020):      TTE (3/13/2020, outside study):  · The study quality was good.   · The left ventricle is normal in size. Top normal left ventricular wall   thickness. There are no segmental wall motion abnormalities. The left   ventricular ejection fraction is 55-60% by visual estimate and 3D   echocardiography. Normal left ventricular ejection fraction.   · There is grade I (mild) LV diastolic dysfunction.   · The right ventricle is normal in size. There is normal function of the   right ventricle.   · The right atrium is mildly dilated.   · There is mild mitral valve  anterior leaflet thickening. There is mild   mitral valve leaflet calcification. There is flattening of the mitral   leaflets without raphael prolapse. There is trace mitral regurgitation.   · The aortic valve is trileaflet. There is mild thickening of the aortic   valve. There is mild calcification of the aortic valve. There is no aortic   /stenosis. There is trace aortic regurgitation.   · There is mild tricuspid valve leaflet thickening. There is mild to   moderate tricuspid regurgitation. The pulmonary artery systolic pressure   is moderately elevated. Pulmonary artery systolic pressure measures 50   mmHg. There is mid-systolic notching of the RVOT VTI consistent with   elevated pulmonary vascular resistance.   · The inferior vena cava is normal in size (diameter <21 mm) and decreases   >50% in size with inspiration, suggesting a normal right atrial pressure   of 3 mmHg (range 0-5 mm Hg).   · Trivial loculated pericardial effusion present adjacent to the right   ventricle and adjacent to the right atrium.          Assessment / Plan:     1. Chest Pain:  - Given pattern and character, I believe this is musculoskeletal / serous pain from her autoimmune disease. There may be an element of myopericarditis.  - She reports that pain has not responded to increased in steroids in the past   - Non-ACS chest pain and known non-obstructive CAD as recently as March 2024 CCTA.     2. Pulmonary Hypertension (WHO Group I, NYHA/WHO FC III):   - Occurring in the background of Systemic Scleroderma with ILD. August 2022 TTE showed normal RV size and function, but progressive exertional decline, worsened DLCO and resting tachycardia suggest there may be progression of her pulmonary vascular disease and limitation of cardiac output. This was proven with 11/2022 RHC showing mean PA 35 mmHg (with PCWP 13 mmHg) and PVR 4.3 Wood units. Recent TTEs have shown appropriate RV:PA coupling with normal RV size and systolic function.   - She is  hypervolemic on exam  -Discordant weights, no recorded I&Os  -Ethacrynic acid has been held since 8/23 due to increase in sCr. She examines intravascularly overloaded. Weight is 2lbs down from yesterday.   -Awaiting AM labs. Please add BNP.   -Would recommend to resume diuresis, ethacrynic acid 50 mg p.o. daily. Please continue daily standing weights.  Please start recording I's and O's.    - Continue Sildenafil 20 mg TID   - She should continue uninterrupted Macitentan 10 mg daily -her home medication has been brought in and she is receiving it.    3. HTN:   - Continue Amlodipine.     4. Systemic Scleroderma / Raynaud's Disease:   - Per Rheumatology (Dr. Trevizo).      5. ILD:   - Per Pulmonology and Rheumatology (Dr. Trevizo). She has not tolerated trials of Mycophenolate. She briefly received Tocilizumab.     6. PE:   - She has had elevated probability on V/Q scans, but CTA Chest has consistently not shown evidence for acute or chronic thromboembolism. Apixaban has been discontinued.     7. CAD:   - Two-vessel non-obstructive disease on March 2024 CCTA. She is continued on Atorvastatin 40 mg daily.     8. PAD with Toe Ulcerations  -S/P left PTA dSFA/TP and DCB to popliteal arteries 8/14/24. Followed by vascular surgery.   -Hematoma noted with drop in hgb - clopidogrel was held.  -She received 1 unit of blood yesterday.   -monitor hemoglobin, continue Clopidogrel and Atorvastatin.     9. Lung Mass  - Pathology from 8/8 biopsy with atypical glandular cells, possibly consistent with adenocarcinoma  - Management per pulmonology    RAS Marcos  8/26/2024

## 2024-08-26 NOTE — PLAN OF CARE
Care Coordination Discharge Plan Note     Discharge Needs Assessment  Concerns to be Addressed: discharge planning  Current Discharge Risk: chronically ill, physical impairment    Anticipated Discharge Plan  Anticipated Discharge Disposition: home with assistance, home with home health  Type of Home Care Services: home PT, home OT, nursing    Patient Choice  Offered/Gave Vendor List: yes  Patient's Choice of Community Agency(s): [facility]    Patient and/or patient guardian/advocate was made aware of their right to choose a provider. A list of eligible providers was presented and reviewed with the patient and/or patient guardian/advocate in written and/or verbal form. The list delineates providers in the patient’s desired geographic area who are participating in the Medicare program and/or providers contracted with the patient’s primary insurance. The Medicare list and quality ratings were obtained from the Medicare.gov [medicare.gov] website.    ---------------------------------------------------------------------------------------------------------------------    Interdisciplinary Discharge Plan Review:  Participants:pharmacy, , physical therapy, physician, nursing, social work/services, occupational therapy    Concerns Comments: Tentative D/C home with [facility]. Continue CV monitoring. Faxed clinical information to [facility] @ (f) [phone].    Discharge Plan:   Disposition/Destination: Home Health Care - Other / Home    Discharge Transportation:  Is Out of Hospital DNR needed at Discharge: no  Does patient need discharge transport? No

## 2024-08-26 NOTE — NURSING NOTE
Pt assisted fall with tech ambulating from the bathroom, did not hit head, VSS, Carolina RODRIGUEZ aware, no new orders provided, will continue plan of care

## 2024-08-26 NOTE — PLAN OF CARE
Problem: Adult Inpatient Plan of Care  Goal: Plan of Care Review  Outcome: Progressing  Flowsheets (Taken 8/26/2024 0657)  Progress: improving  Outcome Evaluation: pt is aaox3, complaints of chronic pain in abdomen, controlled with tylenol and PO dilaudid. pt N/V continues, scheduled zofran and daily EKGs, no complaints from pt. pt complained of diarrhea, all stool softeners held overnight, diarrhea resolved. standby assist oob, wound care continued. no further neesd at this time  Plan of Care Reviewed With: patient  Goal: Patient-Specific Goal (Individualized)  Outcome: Progressing  Flowsheets (Taken 8/26/2024 0657)  Patient/Family-Specific Goals (Include Timeframe): adequate pain control  Individualized Care Needs: standby oob, fall risk, N/V,D  Anxieties, Fears or Concerns: N/V,D, pain  Goal: Absence of Hospital-Acquired Illness or Injury  Outcome: Progressing  Goal: Optimal Comfort and Wellbeing  Outcome: Progressing  Goal: Readiness for Transition of Care  Outcome: Progressing     Problem: Fall Injury Risk  Goal: Absence of Fall and Fall-Related Injury  Outcome: Progressing     Problem: Skin Injury Risk Increased  Goal: Skin Health and Integrity  Outcome: Progressing     Problem: Mobility Impairment  Goal: Optimal Mobility  Outcome: Progressing     Problem: Self-Care Deficit  Goal: Improved Ability to Complete Activities of Daily Living  Outcome: Progressing   Plan of Care Review  Plan of Care Reviewed With: patient  Progress: improving  Outcome Evaluation: pt is aaox3, complaints of chronic pain in abdomen, controlled with tylenol and PO dilaudid. pt N/V continues, scheduled zofran and daily EKGs, no complaints from pt. pt complained of diarrhea, all stool softeners held overnight, diarrhea resolved. standby assist oob, wound care continued. no further neesd at this time

## 2024-08-27 LAB
ATRIAL RATE: 79
CROSSMATCH: NORMAL
ISBT CODE: 5100
P AXIS: 46
PR INTERVAL: 144
PRODUCT CODE: NORMAL
PRODUCT STATUS: NORMAL
QRS DURATION: 84
QT INTERVAL: 408
QTC CALCULATION(BAZETT): 467
R AXIS: 15
SPECIMEN EXP DATE BLD: NORMAL
T WAVE AXIS: 107
UNIT ABO: NORMAL
UNIT ID: NORMAL
UNIT RH: POSITIVE
VENTRICULAR RATE: 79

## 2024-08-27 PROCEDURE — 63700000 HC SELF-ADMINISTRABLE DRUG: Performed by: INTERNAL MEDICINE

## 2024-08-27 PROCEDURE — 99233 SBSQ HOSP IP/OBS HIGH 50: CPT | Performed by: NURSE PRACTITIONER

## 2024-08-27 PROCEDURE — 63600000 HC DRUGS/DETAIL CODE: Mod: JZ | Performed by: STUDENT IN AN ORGANIZED HEALTH CARE EDUCATION/TRAINING PROGRAM

## 2024-08-27 PROCEDURE — 25800000 HC PHARMACY IV SOLUTIONS: Performed by: STUDENT IN AN ORGANIZED HEALTH CARE EDUCATION/TRAINING PROGRAM

## 2024-08-27 PROCEDURE — 63700000 HC SELF-ADMINISTRABLE DRUG: Performed by: NURSE PRACTITIONER

## 2024-08-27 PROCEDURE — 63600000 HC DRUGS/DETAIL CODE: Mod: JZ | Performed by: NURSE PRACTITIONER

## 2024-08-27 PROCEDURE — 99233 SBSQ HOSP IP/OBS HIGH 50: CPT | Performed by: INTERNAL MEDICINE

## 2024-08-27 PROCEDURE — 63700000 HC SELF-ADMINISTRABLE DRUG: Performed by: STUDENT IN AN ORGANIZED HEALTH CARE EDUCATION/TRAINING PROGRAM

## 2024-08-27 PROCEDURE — 200200 PR NO CHARGE: Performed by: INTERNAL MEDICINE

## 2024-08-27 PROCEDURE — 93010 ELECTROCARDIOGRAM REPORT: CPT | Performed by: INTERNAL MEDICINE

## 2024-08-27 PROCEDURE — 93005 ELECTROCARDIOGRAM TRACING: CPT | Performed by: NURSE PRACTITIONER

## 2024-08-27 PROCEDURE — 21400000 HC ROOM AND CARE CCU/INTERMEDIATE

## 2024-08-27 RX ORDER — ETHACRYNIC ACID 25 MG/1
50 TABLET ORAL DAILY
Status: DISCONTINUED | OUTPATIENT
Start: 2024-08-27 | End: 2024-08-27

## 2024-08-27 RX ORDER — ETHACRYNIC ACID 25 MG/1
50 TABLET ORAL 2 TIMES DAILY
Status: COMPLETED | OUTPATIENT
Start: 2024-08-27 | End: 2024-08-27

## 2024-08-27 RX ADMIN — ONDANSETRON 4 MG: 2 INJECTION INTRAMUSCULAR; INTRAVENOUS at 18:18

## 2024-08-27 RX ADMIN — ACETAMINOPHEN 975 MG: 325 TABLET ORAL at 00:23

## 2024-08-27 RX ADMIN — GABAPENTIN 200 MG: 100 CAPSULE ORAL at 09:18

## 2024-08-27 RX ADMIN — GLYCERIN 1 DROP: .002; .002; .01 SOLUTION/ DROPS OPHTHALMIC at 20:50

## 2024-08-27 RX ADMIN — LEVOTHYROXINE SODIUM 100 MCG: 0.1 TABLET ORAL at 05:44

## 2024-08-27 RX ADMIN — ACETAMINOPHEN 975 MG: 325 TABLET ORAL at 23:14

## 2024-08-27 RX ADMIN — SILDENAFIL 20 MG: 20 TABLET, FILM COATED ORAL at 09:18

## 2024-08-27 RX ADMIN — ONDANSETRON 4 MG: 2 INJECTION INTRAMUSCULAR; INTRAVENOUS at 11:31

## 2024-08-27 RX ADMIN — GABAPENTIN 200 MG: 100 CAPSULE ORAL at 20:49

## 2024-08-27 RX ADMIN — SENNOSIDES 2 TABLET: 8.6 TABLET, FILM COATED ORAL at 20:49

## 2024-08-27 RX ADMIN — ONDANSETRON 4 MG: 2 INJECTION INTRAMUSCULAR; INTRAVENOUS at 23:15

## 2024-08-27 RX ADMIN — HYDROMORPHONE HYDROCHLORIDE 2 MG: 2 TABLET ORAL at 20:57

## 2024-08-27 RX ADMIN — SODIUM CHLORIDE 125 MG: 9 INJECTION, SOLUTION INTRAVENOUS at 09:34

## 2024-08-27 RX ADMIN — GABAPENTIN 200 MG: 100 CAPSULE ORAL at 14:27

## 2024-08-27 RX ADMIN — ACETAMINOPHEN 975 MG: 325 TABLET ORAL at 18:18

## 2024-08-27 RX ADMIN — LIDOCAINE 4% 1 PATCH: 40 PATCH TOPICAL at 09:22

## 2024-08-27 RX ADMIN — SILDENAFIL 20 MG: 20 TABLET, FILM COATED ORAL at 20:49

## 2024-08-27 RX ADMIN — Medication: at 23:16

## 2024-08-27 RX ADMIN — CLOTRIMAZOLE 1 APPLICATORFUL: 2 CREAM VAGINAL at 20:49

## 2024-08-27 RX ADMIN — ONDANSETRON 4 MG: 2 INJECTION INTRAMUSCULAR; INTRAVENOUS at 05:44

## 2024-08-27 RX ADMIN — AMLODIPINE BESYLATE 10 MG: 10 TABLET ORAL at 09:18

## 2024-08-27 RX ADMIN — PANTOPRAZOLE SODIUM 40 MG: 40 TABLET, DELAYED RELEASE ORAL at 09:18

## 2024-08-27 RX ADMIN — ETHACRYNIC ACID 50 MG: 25 TABLET ORAL at 18:18

## 2024-08-27 RX ADMIN — ACETAMINOPHEN 975 MG: 325 TABLET ORAL at 11:31

## 2024-08-27 RX ADMIN — SILDENAFIL 20 MG: 20 TABLET, FILM COATED ORAL at 14:27

## 2024-08-27 RX ADMIN — ONDANSETRON 4 MG: 2 INJECTION INTRAMUSCULAR; INTRAVENOUS at 00:24

## 2024-08-27 RX ADMIN — ACETAMINOPHEN 975 MG: 325 TABLET ORAL at 05:43

## 2024-08-27 RX ADMIN — BENZOCAINE 6 MG-MENTHOL 10 MG LOZENGES 1 LOZENGE: at 20:57

## 2024-08-27 RX ADMIN — GLYCERIN 1 DROP: .002; .002; .01 SOLUTION/ DROPS OPHTHALMIC at 09:18

## 2024-08-27 RX ADMIN — WHITE PETROLATUM: 1.75 OINTMENT TOPICAL at 09:23

## 2024-08-27 RX ADMIN — CLOPIDOGREL 75 MG: 75 TABLET ORAL at 09:18

## 2024-08-27 RX ADMIN — ETHACRYNIC ACID 50 MG: 25 TABLET ORAL at 09:18

## 2024-08-27 RX ADMIN — CYANOCOBALAMIN TAB 1000 MCG 1000 MCG: 1000 TAB at 09:18

## 2024-08-27 RX ADMIN — SALINE NASAL SPRAY 2 SPRAY: 1.5 SOLUTION NASAL at 09:18

## 2024-08-27 ASSESSMENT — COGNITIVE AND FUNCTIONAL STATUS - GENERAL
STANDING UP FROM CHAIR USING ARMS: 3 - A LITTLE
WALKING IN HOSPITAL ROOM: 3 - A LITTLE
MOVING TO AND FROM BED TO CHAIR: 3 - A LITTLE
CLIMB 3 TO 5 STEPS WITH RAILING: 3 - A LITTLE
CLIMB 3 TO 5 STEPS WITH RAILING: 3 - A LITTLE
MOVING TO AND FROM BED TO CHAIR: 3 - A LITTLE
WALKING IN HOSPITAL ROOM: 3 - A LITTLE
STANDING UP FROM CHAIR USING ARMS: 3 - A LITTLE

## 2024-08-27 NOTE — PLAN OF CARE
Problem: Adult Inpatient Plan of Care  Goal: Plan of Care Review  Outcome: Progressing  Flowsheets (Taken 8/27/2024 0207)  Progress: no change  Outcome Evaluation: Pt AAOx4. Taking tylenol atc for chronic abd/chest/leg pain. Intermittent nausea, IV zofran as ordered. No further blood in sputum Pt OOB to BSC with assistx1. No c/o dizziness so far this shift. Bed alarm on, call bell in reach.  Plan of Care Reviewed With: patient

## 2024-08-27 NOTE — PROGRESS NOTES
Palliative Care Progress Note    This is Hospital Day: 20    Conversation/Goals of Care: Life prolongation and restoration      Nausea & vomiting  Assessment & Plan  In the setting of extensive stool burden  No vomiting today, last reported 8/26, nausea ongoing  -See constipation problem  -Continue Zofran 4 mg IV every 6 hours ATC  -Recommend restarting Reglan 5 mg PO every 6 hours as needed    Other constipation  Assessment & Plan  -LBM : 8/27  -improved constipation since Methylnaltrexone was initiated on 8/23  -constipation likely multifactorial r/t current hospitalization, heart failure on diuretic therapy, debility related to multiple medical comorbid conditions, opioids and patient previously declining bowel regimen.   -CT abdomen 8/9 with moderate colonic stool burden   -KUB 8/22 & 8/23  showing extensive colon stool burden      Plan:  - Agree w/ Senna 2 tablets BID   -t/c holding Methylnatrexone due to loose stool             Pain  Assessment & Plan  - Chronic pain attributed to: chronic chest pain, scleroderma c/b pulmonary HTN/ILD, Raynaud's Disease  - New right lower abdominal pain worse after LLE angio on 8/14, extensive stool burden may also be contributory.   -Left upper chest/rib pain improved, left foot and right groin pain is unchanged.   - Patient with numerous allergies to various pain medications (opioids/NSAIDS). She reports no relief with Fentanyl.  She has used dilaudid in the past with some relief.   - PDMP queried: no recent scripts  - Home regimen: lidocaine patch  - Medications available inpatient: Acetaminophen 975 mg p.o. every 6 hours, lidocaine patches, cyclobenzaprine 5 mg p.o. 3 times daily as needed, heating pad  - Tramadol d/c'd 2/2 dizziness  - lyrica d/c'd 2/2 blurred vision  -Continue Gabapentin to 200 mg TID (continue to monitor renal function in the setting of n/v)  -Continue Dilaudid 2 mg PO every 4 hours as needed-patient encouraged to ask for medication  -Agree w/ ATC  Tylenol 975 mg every 6 hours     Plan:  - Follow up with pain management as outpt        Palliative care by specialist  Assessment & Plan  63 y.o. female with a PMH significant for severe scleroderma complicated by pulmonary hypertension, ILD.  She presented to [facility] on 8/8 for planned robotic navigational and EBUS bronchoscopies to evaluate growing LLL nodule c/b chest pain, volume overload, n/v and pain.     - Code status: Full Code  - Prognosis:  High risk for acute deterioration and decline  - Capacity for Medical Decision Making: intact  - Advance Directives: No  - Goals of care: Goals are Life-prolonging/disease directed.  Refer to ACP note dated 8/10.  - Surrogate Decision Maker: Patient identifies her 2 adult children, Tiffanie and Wagner, as joint surrogate decision makers        Debility  Assessment & Plan  - Debility likely multifactorial in the setting of multiple chronic medical conditions including scleroderma, CHF, PVD s/p LLE angio c/b retroperitoneal hematoma.   - Living situation prior to hospitalization: Lives at home with 24/7 aides  - Baseline functional status is: Ambulatory short distances  - Current Palliative Performance Status: 40%  - Baseline Palliative Performance Status:  50-60%    - Frailty   - Patient remains high risk for further deterioration and functional decline.    Plan  - Patient open to both palliative bridge and home based palliative NP visits            Interval History (Subjective)    Source: chart review,  the patient, nurse and primary medical team    Patient awake, alert, oriented X4, left chest pain improved, left foot and groin pain unchanged 10/10.  No dilaudid used since 8/26. No vomiting today, nausea ongoing.     Current Medications:    acetaminophen, 975 mg, oral, q6h SPENCER    amLODIPine, 10 mg, oral, q AM    atorvastatin, 40 mg, oral, Daily (6p)    benzocaine-menthol, 1 lozenge, Mouth/Throat, q2h PRN    benzonatate, 100 mg, oral, 3x daily PRN    [Provider Managed  Hold] bisacodyL, 10 mg, rectal, TID    carboxymethylcellulose, 1 drop, Right Eye, 3x daily PRN    clopidogreL, 75 mg, oral, Daily    clotrimazole, 1 Applicatorful, vaginal, Nightly    cyanocobalamin, 1,000 mcg, oral, Daily    cyclobenzaprine, 5 mg, oral, 3x daily PRN    glucose, 16-32 g of dextrose, oral, PRN **OR** dextrose, 15-30 g of dextrose, oral, PRN **OR** glucagon, 1 mg, intramuscular, PRN **OR** dextrose 50 % in water (D50), 25 mL, intravenous, PRN    [START ON 8/28/2024] ethacrynic acid (EDECRIN) 50 mg in sodium chloride 0.9 % 50 mL IVPB, 50 mg, intravenous, Once    famotidine, 20 mg, intravenous, Daily PRN **OR** famotidine, 20 mg, oral, Daily PRN    ferric gluconate (FERRLECIT) 125 mg in sodium chloride 0.9 % 100 mL IVPB, 125 mg, intravenous, Daily    gabapentin, 200 mg, oral, TID    honey, , Topical, Daily PRN    HYDROmorphone, 2 mg, oral, q4h PRN    levothyroxine, 100 mcg, oral, Daily (6:30a)    lidocaine, 1 patch, Topical, Daily    lidocaine, 1 patch, Topical, Daily    lidocaine, 1 patch, Topical, Daily    macitentan, 10 mg, oral, Daily    melatonin, 3 mg, oral, Nightly PRN    methylnaltrexone, 0.075 mg/kg, subcutaneous, Every other day    [Provider Managed Hold] mineral oil, 133 mL, rectal, BID    [DISCONTINUED] ondansetron ODT, 4 mg, oral, q6h PRN **OR** ondansetron, 4 mg, intravenous, q6h SPENCER    pantoprazole, 40 mg, oral, Daily    artificial tears, 1 drop, Both Eyes, q4h while awake    phenol, 1 spray, Mouth/Throat, q2h PRN    senna, 2 tablet, oral, BID    sildenafiL (pulm.hypertension), 20 mg, oral, TID    sodium chloride, 2 spray, Each Nostril, Daily    white petrolatum, , Topical, q4h PRN    white petrolatum, , Topical, BID    white petrolatum-mineral oil, , Both Eyes, Nightly    clopidogreL    cyclobenzaprine    ethacrynic acid    gabapentin    honey    ondansetron    polyethylene glycol    Objective    Physical Exam:  General:   No Acute Distress  Eyes:  Sclera Anicteric  ENMT:  Mucus  Membranes Moist  CV:  Radial Pulses 3  bilateral  Lungs:  No evidence of increased WOB  Abdomen:  Bowel Sounds present  Psych:  Appropriate and Cooperative  Neuro:  Awake, Alert and Oriented  person, place, time/date and situation  Skin:  No Rash    Vitals:  Vitals:    08/27/24 1504   BP: (!) 117/56   Pulse: 85   Resp:    Temp: 37.1 °C (98.8 °F)   SpO2: 95%       Laboratory Studies:    CBC Results         08/26/24 08/25/24 08/24/24     1211 1127 0951    WBC 9.87 8.43 7.84    RBC 3.42 2.78 2.97    HGB 8.8 6.8 7.3    HCT 29.6 23.3 24.8    MCV 86.5 83.8 83.5    MCH 25.7 24.5 24.6    MCHC 29.7 29.2 29.4     265 233       CMP Results         08/26/24 08/25/24 08/24/24     1211 1127 0951     139 139    K 4.8 4.7 4.8    Cl 108 108 106    CO2 24 27 28    Glucose 83 109 125    BUN 27 34 41    Creatinine 1.3 1.5 1.6    Calcium 9.0 8.7 9.0    Anion Gap 8 4 5    EGFR 46.3 39.0 36.1         Troponin I Results         08/09/24 08/09/24 07/25/24     1232 1048 1732    HS Troponin I 93.8 92.5 66.8         ABG Results         04/13/23     1949    Source Of Oxygen nc         Labs reviewed-significant for anemia, elevated but down trending creatinine    Imaging and Other Studies:     Radiology Imaging    XR ABDOMEN 1 VW    Narrative  CLINICAL HISTORY: Ileus follow-up    PRIOR STUDY: Abdominal x-ray dated 8/23/2024    TECHNIQUE: AP abdomen and pelvis    COMMENT:  There is a moderate to large amount of stool noted within the colon.  No definite bowel dilatation.    Impression  IMPRESSION: See above.                                                                      Cardiac Imaging    TRANSTHORACIC ECHO (TTE) LIMITED 07/19/2024    Interpretation Summary  Rhythm is sinus tachycardia.    Mild increased wall thickness with normal left ventricular cavity and preserved systolic function. EF 65%. No wall motion abnormalities.  Normal right ventricular size with preserved systolic function.  IVC normal in size with >50%  respiratory variation consistent with normal right sided filling pressures.  Small pericardial effusion. No evidence of hemodynamic compromise with normal respiratory variation and no chamber collapse. Prominent epicardial fat.  Compared with previous study of 5/23/2024, small pericardial effusion persists.       No new imaging since 8/23      Lab Results   Component Value Date    QTCCALCULAT 467 08/27/2024         KARYNA Pop  Palliative Care Nurse Practitioner  [facility]  [phone] [facility] office  [phone] [facility] Fax  [address]  [place] [phone]  Pager [phone]

## 2024-08-27 NOTE — PROGRESS NOTES
Cardiology - Pulmonary Hypertension Progress Note       Interval History:  Examined sitting up in bed, reports she is feeling the same as yesterday, reports she feels like she has congestion in her chest; unchanged foot and abdominal pain, reports that breathing is unchanged.     She had a fall yesterday while ambulating to the bathroom. She did not hit head.        Consult background from 8/9  Ms. Felix is a 63 y.o. female with a past medical history of Pulmonary HTN, HTN, Raynaud's Disease, Cutaneous Systemic Scleroderma (dx 1998), ILD, PE (3/2023). She is a patient of Dr. Nguyễn. She was previously followed by Dr. Jos Valdez at [facility]. Echocardiogram from 4/21/2022 showed LVEF: 55-60%, mild aortic valve thickening, pulmonary artery systolic pressure: 52 mmHg and trivial pericardial effusion. LHC and RHC (separate occasions) over the last 5-10 years which showed normal hemodynamics and normal coronaries. She had a RHC 9/02/2020 showing top-normal right sided filling pressures with mild pulmonary hypertension, top-normal PVR and normal pulmonary capillary wedge pressure. The cardiac index was normal, seen below.     On 6/27/2022, she was in [facility] ER for chest pain. EKG: NSR without ischemic changes. hsTrop: 12->16, d-dimer: 0.56, CXR showed cardiomegaly and diffuse interstitial prominence.     Dr. Nguyễn ordered echocardiogram, which was completed on 8/22/2022 and showed estimated RVSP = 50-55 mmHg, normal-sized LV with normal LV systolic function. Estimated EF 55- 60%. Wall motion grossly normal, mild concentric left ventricular hypertrophy, grade I LV diastolic dysfunction, probably normal RV size and function.    At her initial visit on 10/11/2022, she reported that she felt very short of breath with minimal activity most of the time. She reports that this started about 1 year prior and had progressively gotten worse. She described PND and bendopnea. She reported that she experienced ankle swelling,  worsened over the past year and usually worse towards the end of the night. She also reported some swelling in her abdomen. She reported daily palpations. I recommended a RHC which was done 11/28/2022 and showed: Normal right sided filling pressures. Mildly elevated left sided filling pressures. Precapillary pulmonary hypertension with mean PA 36 mmHg.    She was hospitalized on 3/19/2023 at [facility] for PE diagnosed on V/Q scan. She reports that she had presented with left arm pain and heaviness. She states that she was started on Apixaban. One week after her last office visit (4/6/2023), she presented to [facility] with chest pain, epistaxis and hemoptysis. Echocardiogram showed: left ventricular cavity size normal with mild left ventricular hypertrophy and preserved systolic function. Estimated EF 65%. Chest CTA showed no evidence of PE; Apixaban was discontinued.     She had a repeat echocardiogram (6/2023) which showed: Normal left ventricular cavity size and mild concentric left ventricular hypertrophy. Normal systolic function. EF: 60%. Normal right ventricular structure and function. Mild tricuspid valve regurgitation with estimated RVSP: 55 mmHg. Compared to previous study from 4/14/2023, estimated right ventricular systolic pressure were higher.    She underwent V/Q scan in September 2023, which showed intermediate-to-high probability of pulmonary embolic disease, as a result she completed CTA Chest PE which showed: no evidence for filling defect or vessel cutoff to suggest pulmonary embolism. Enlargement of the pulmonary arteries compatible with pulmonary arterial hypertension was seen.    She reports she started Macitentan around August or September 2023. She stated that she had not been checking her SpO2. She reports she did not notice any difference since starting it. She reported she had been feeling more shortness of breath at times with ADLs.     She presented to [facility] on 3/3/2024 with chest  pressure, palpitations and nausea. She was noted to have hypoxia and tachypnea at presentation. She was found to have moderate-to-large pericardial effusion without tamponade. She was started on Colchicine 0.6 mg BID on 3/4/2024 and when she did not improve symptomatically, Prednisone 20 mg daily was added on 3/5/2024. On 3/7/2024, she developed multiple episodes of diarrhea and Colchicine dose was decreased to 0.3 mg. Coronary CTA, done as part of the ischemic workup, showed moderate 2 vessel CAD. She was recommended to begin Atorvastatin 20 mg daily and Clopidogrel 75 mg daily. She initially refused those two medications, but eventually agreed to take Atorvastatin.      She was discharged on Colchicine 0.3 mg for at least 3 months and Prednisone 20 mg for a total two week course until 3/19/2024, then decrease the dose to 10 mg daily for 2 weeks until 4/2/2024, then decrease dose to 5 mg daily for 2 weeks until 4/16/2024. She reports she tapered off Prednisone as instructed and has continued to take Colchicine as prescribed. She completed an echocardiogram (4/10/2024) which showed: Small-to-moderate sized, circumferential pericardial effusion. No echocardiographic evidence for cardiac tamponade. Compared to previous study from 3/4/2024, no significant change. Pericardial effusion size was similar, estimated right atrial pressure lower.      She presented to [facility] (4/16/2024) with increased pleuritic chest pain. On presentation to the ER, she was noted to be hypertensive and tachycardic. Labs showed mildly elevated troponin, elevated BNP and leukocytosis. CT angiography of the chest did not show evidence of PE. It was read as large pericardial effusion, evidence of emphysema, evidence of pulmonary hypertension, 16 mm left lobe lung mass.       She presented again to [facility] on 5/21/2024 at the request of her Rheumatologist. During her visit there, she was noted to be tachycardic and with some shortness of breath. Her  "chest pain was improved from her prior admission. On arrival, O2 saturations were normal. ECG showed: Sinus Tachycardia (HR: 125 bpm). BNP >300, hsTrop 32 ->24. POCUS in the emergency department showed evidence of pericardial effusion with no tamponade physiology. CTA Chest did not show evidence of pulmonary embolism. There were bilateral changes radiographically concerning for volume overload. She received Methylpredinsolone 125 mg in ED. Of note, she was on Prednisone 10 mg daily at home. She was given IV diuresis. She was discharged on Furosemide 20 mg PO PRN daily for fluid weight gain / swelling.     She had brief hospitalization and was discharged on 7/23 for chest pain. She presented to the ED on 7/25/2024 for worsening of her chronic chest pain and 8/5/2024 for facial swelling and chronic chest pain.      She underwent bronchoscopic lung biopsy yesterday, 8/8/2024, with Dr. Niles Rodriguez. Pathology showed atypical glandular cells which may be consistent with adenocarcinoma in situ.     Post-procedure CXR showed increase pulmonary vascular congestion. She was admitted for optimization.            ---    Rheumatology: Dr. Trevizo  Pulmonology: Dr. Wasserman    Past Medical / Surgical History:  Pulmonary HTN, HTN, Raynaud's Disease, Cutaneous Systemic Scleroderma (dx 1998), ILD, PE (3/2023), Follicular Carcinoma of Thyroid, Tenosynovitis, de Quervain, h/o Hernia Repair, h/o Appendicitis, h/o Digit Amputation, H/o Total Thyroidectomy (Dr. Pacheco at [facility]; 4/2013)    Family & Social History: She is , she has two children. She has worked as an .     Tobacco: former 2005  EtOH: never   Illicits: never     Her brother has CAD with stent  1st cousin with SLE  Both parents had \"heart problems\"    Allergies:   Allergies   Allergen Reactions    Aspirin Shortness of breath     Other reaction(s): Unknown  \"stop breathing\"      Ibuprofen Shortness of breath     Stop breathing    Iodine Shortness of breath     " Other reaction(s): Unknown    Iodine And Iodide Containing Products Shortness of breath     ? rash    Morphine Shortness of breath      Reported wamrth of face, improved with benadryl and cold compresses after getting morphine as well as episode of (subjective ) dyspnea, and thought she passed out - did have wamrth and erythema of face with mild edema R>L cheek notably on exam.     Penicillins Shortness of breath     Other reaction(s): Unknown    Sulfa (Sulfonamide Antibiotics) Angioedema    Clindamycin     Hydrochlorothiazide      Other reaction(s): Abdominal Pain   Slow heart rate    Oxycodone      Other reaction(s): Vomiting    Sulfamethoxazole-Trimethoprim      Other reaction(s): Vomiting, Weakness     Latex Rash       Medications:   Current Facility-Administered Medications   Medication Dose Route Frequency Provider Last Rate Last Admin    acetaminophen (TYLENOL) tablet 975 mg  975 mg oral q6h SPENCER Simon Cano DO   975 mg at 08/27/24 0543    amLODIPine (NORVASC) tablet 10 mg  10 mg oral q AM Simon Cano DO   10 mg at 08/26/24 0922    atorvastatin (LIPITOR) tablet 40 mg  40 mg oral Daily (6p) Simon Cano DO   40 mg at 08/22/24 1733    benzocaine-menthol (CEPACOL/CHLORASEPTIC) lozenge 1 lozenge  1 lozenge Mouth/Throat q2h PRN Simon Cano DO   1 lozenge at 08/22/24 0841    benzonatate (TESSALON) capsule 100 mg  100 mg oral 3x daily PRN Simon Cano DO   100 mg at 08/22/24 0841    [Provider Managed Hold] bisacodyL (DULCOLAX) 10 mg suppository 10 mg  10 mg rectal TID Molly Hidalgo CRNP   10 mg at 08/24/24 2015    carboxymethylcellulose (REFRESH PLUS) 0.5 % ophthalmic dropperette 1 drop  1 drop Right Eye 3x daily PRN Simon Cano DO   1 drop at 08/20/24 0817    clopidogreL (PLAVIX) tablet 75 mg  75 mg oral Daily Simon Cano DO   75 mg at 08/26/24 0921    clotrimazole (LOTRIMIN) 2 % vaginal cream 1  Applicatorful  1 Applicatorful vaginal Nightly Arleth Pastor CRNP   1 Applicatorful at 08/26/24 2111    cyanocobalamin (VITAMIN B12) tablet 1,000 mcg  1,000 mcg oral Daily Simon Cano DO   1,000 mcg at 08/26/24 0921    cyclobenzaprine (FLEXERIL) tablet 5 mg  5 mg oral 3x daily PRN Simon Cano DO   5 mg at 08/22/24 1404    glucose chewable tablet 16-32 g of dextrose  16-32 g of dextrose oral PRN Simon Cano DO        Or    dextrose 40 % oral gel 15-30 g of dextrose  15-30 g of dextrose oral PRN Simon Cano DO        Or    glucagon (GLUCAGEN) injection 1 mg  1 mg intramuscular PRN Simon Cano DO        Or    dextrose 50 % in water (D50) injection 12.5 g  25 mL intravenous PRN Simon Cano DO        ethacrynic acid (EDECRIN) tablet 50 mg  50 mg oral Daily Ba Figueroa MD   50 mg at 08/23/24 0814    famotidine (PEPCID) injection 20 mg  20 mg intravenous Daily PRN Molly Hidalgo CRNP        Or    famotidine (PEPCID) tablet 20 mg  20 mg oral Daily PRN Molly Hidalgo CRNP        ferric gluconate (FERRLECIT) 125 mg in sodium chloride 0.9 % 100 mL IVPB  125 mg intravenous Daily Maddy Cope MD   Stopped at 08/26/24 1111    gabapentin (NEURONTIN) capsule 200 mg  200 mg oral TID Maddy Cope MD   200 mg at 08/26/24 2104    honey (MEDIHONEY) 100 % topical paste   Topical Daily PRN Simon Cano DO   Given at 08/19/24 0830    HYDROmorphone (DILAUDID) tablet 2 mg  2 mg oral q4h PRN Molly Hidalgo CRNP   2 mg at 08/26/24 0338    levothyroxine (SYNTHROID) tablet 100 mcg  100 mcg oral Daily (6:30a) Simon Cano DO   100 mcg at 08/27/24 0544    lidocaine (ASPERCREME) 4 % topical patch 1 patch  1 patch Topical Daily Simon Cano DO   1 patch at 08/26/24 0922    lidocaine (ASPERCREME) 4 % topical patch 1 patch  1 patch Topical Daily Simon Cano DO   1 patch at 08/26/24 0980     lidocaine (ASPERCREME) 4 % topical patch 1 patch  1 patch Topical Daily Simon Cano DO   1 patch at 08/26/24 0922    macitentan (OPSUMIT) tablet 10 mg (Patient Owned)  10 mg oral Daily Simon Cano DO   10 mg at 08/26/24 0926    melatonin ODT 3 mg  3 mg oral Nightly PRN Simon Cano DO   3 mg at 08/21/24 2119    methylnaltrexone (RELISTOR) injection vial 4 mg  0.075 mg/kg subcutaneous Every other day Maddy Cope MD   4 mg at 08/25/24 0848    [Provider Managed Hold] mineral oil enema 133 mL  133 mL rectal BID Maddy Cope MD   133 mL at 08/24/24 1243    ondansetron (ZOFRAN) injection 4 mg  4 mg intravenous q6h SPENCER Molly Hidalgo CRNP   4 mg at 08/27/24 0544    pantoprazole (PROTONIX) tablet,delayed release (DR/EC) 40 mg  40 mg oral Daily Molly Hidalgo CRNP   40 mg at 08/26/24 0922    peg 400-hypromellose-glycerin (ARTIFICIAL TEARS) 1-0.2-0.2 % eye drops 1 drop  1 drop Both Eyes q4h while awake Simon Cano DO   1 drop at 08/26/24 0923    phenoL (SORE THROAT SPRAY) 1.4 % mucosal spray 1 spray  1 spray Mouth/Throat q2h PRN Simon Cano DO        senna (SENOKOT) tablet 2 tablet  2 tablet oral BID Maddy Cope MD   2 tablet at 08/24/24 2017    sildenafiL (pulm.hypertension) (REVATIO) tablet 20 mg  20 mg oral TID Simon Cano DO   20 mg at 08/26/24 2104    sodium chloride (OCEAN) 0.65 % nasal spray 2 spray  2 spray Each Nostril Daily Simon Cano DO   2 spray at 08/26/24 0923    white petrolatum (AQUAPHOR) ointment   Topical q4h PRN Simon Cano DO   Given at 08/13/24 2117    white petrolatum (AQUAPHOR) ointment   Topical BID Simon Cano DO   Given at 08/26/24 2105    white petrolatum-mineral oil (ARTIFICIAL TEARS OINT) ophthalmic ointment   Both Eyes Nightly Simon Cano DO   Given at 08/25/24 2130       Review of Systems:   All other systems reviewed and are negative  except as per HPI.    Physical Exam:     Wt Readings from Last 10 Encounters:   08/27/24 54.6 kg (120 lb 4.8 oz)   08/05/24 49.4 kg (109 lb)   08/05/24 49.4 kg (109 lb)   07/20/24 49.5 kg (109 lb 2 oz)   07/19/24 51.3 kg (113 lb)   06/20/24 51.7 kg (114 lb)   06/14/24 52.6 kg (116 lb)   05/23/24 52.4 kg (115 lb 9.6 oz)   04/17/24 53.5 kg (118 lb)   04/10/24 44.5 kg (98 lb)       BP Readings from Last 5 Encounters:   08/27/24 (!) 126/59   08/05/24 (!) 148/75   07/25/24 (!) 155/77   07/23/24 (!) 101/57   07/19/24 (!) 154/80       Vitals:    08/27/24 0722   BP: (!) 126/59   Pulse: 77   Resp:    Temp: 36.9 °C (98.5 °F)   SpO2: 98%       Constitutional: NAD, AAOx3  HENT: Normocephalic, atraumatic head, sclerae anicteric, no cervical lymphadenopathy, trachea midline, facial swelling  Cardiovascular: RRR, no murmurs, rubs or gallops, JVP: 13 cm H2O   cm H2O, no carotid bruits.  Respiratory: CTA bilateral lung fields, no wheezes, rales or rhonchi  GI: soft, non-tender/non-distended  Musculoskeletal / Extremities: Bilateral hip edema noted, +2 pulses bilateral radial, DP/PT arteries, skin tightening on face and fingertips, ulceration and discoloration of 2nd phalange b/l LE.   Skin: no rashes. Facial changes c/w scleroderma  Neuro / Psych: no focal deficits, CNII-XII grossly intact, appropriate, cooperative    Diagnostic Data:   Results from last 7 days   Lab Units 08/26/24  1211 08/25/24  1127 08/24/24  0951   WBC K/uL 9.87 8.43 7.84   HEMOGLOBIN g/dL 8.8* 6.8* 7.3*   HEMATOCRIT % 29.6* 23.3* 24.8*   MCV fL 86.5 83.8 83.5   PLATELETS K/uL 269 265 233     Results from last 7 days   Lab Units 08/26/24  1211 08/25/24  1127 08/24/24  0951   SODIUM mEQ/L 140 139 139   POTASSIUM mEQ/L 4.8 4.7 4.8   CHLORIDE mEQ/L 108* 108* 106   CO2 mEQ/L 24 27 28   BUN mg/dL 27* 34* 41*   CREATININE mg/dL 1.3* 1.5* 1.6*   CALCIUM mg/dL 9.0 8.7 9.0   GLUCOSE mg/dL 83 109* 125*   MAGNESIUM mg/dL 2.0 2.1 2.0       Component      Latest Ref Rng  7/20/2024 7/25/2024   Sodium      136 - 145 mEQ/L 142  139    Potassium, Bld      3.5 - 5.1 mEQ/L 4.2  4.3    Chloride      98 - 107 mEQ/L 107  103    CO2      21 - 31 mEQ/L 25  27    BUN      7 - 25 mg/dL 17  17    Creatinine      0.6 - 1.2 mg/dL 1.2  1.1    Glucose      70 - 99 mg/dL 85  86    Calcium      8.6 - 10.3 mg/dL 9.3  10.6 (H)    AST (SGOT)      13 - 39 IU/L  15    ALT (SGPT)      7 - 52 IU/L  8    Alkaline Phosphatase      34 - 125 IU/L  82    Total Protein      6.0 - 8.2 g/dL  8.8 (H)    Albumin      3.5 - 5.7 g/dL  4.3    Bilirubin, Total      0.3 - 1.2 mg/dL  0.4    eGFR      >=60.0 mL/min/1.73m*2 51.0 (L)  56.6 (L)    Anion Gap      3 - 15 mEQ/L 10  9       Component      Latest Ref Rng 7/20/2024 7/25/2024   WBC      3.80 - 10.50 K/uL 7.27  10.29    Hemoglobin      11.8 - 15.7 g/dL 9.8 (L)  11.2 (L)    Hematocrit      35.0 - 45.0 % 32.0 (L)  37.3    MCV      83.0 - 98.0 fL 80.4 (L)  80.4 (L)    Platelets      150 - 369 K/uL 206  331       Component      Latest Ref Rng 7/19/2024 7/25/2024 8/9/2024   High Sens Troponin I      <15.0 pg/mL 89.2 (HH)  66.8 (HH)  92.5 (HH)    High Sens Troponin I       90.3 (HH)  83.4 (HH)         Component      Latest Ref Rng 5/22/2024 6/22/2024 7/19/2024 7/25/2024   BNP      <=100 pg/mL 352 (H)  150 (H)  151 (H)  220 (H)       Component      Latest Ref Rng 4/16/2024 5/23/2024 7/21/2024 7/22/2024   Sed Rate      0 - 30 mm/hr 101 (H)  79 (H)  105 (H)  >119 (H)       Component      Latest Ref Rng 4/18/2024 5/24/2024   WBC      3.80 - 10.50 K/uL 8.40  16.22 (H)    RBC      3.93 - 5.22 M/uL 3.54 (L)  3.82 (L)    Hemoglobin      11.8 - 15.7 g/dL 9.8 (L)  10.3 (L)    Hematocrit      35.0 - 45.0 % 32.8 (L)  33.9 (L)    Platelets      150 - 369 K/uL 254  289       Component      Latest Ref Rng 5/24/2024   Magnesium      1.8 - 2.5 mg/dL 2.1      Component      Latest Ref Rng 4/13/2023 3/7/2024 5/22/2024   TSH      0.34 - 5.60 mIU/L 4.71  0.28 (L)  1.03      Component      Latest  Ref Rng 5/24/2024   Sodium      136 - 145 mEQ/L 138    Potassium, Bld      3.5 - 5.1 mEQ/L 4.4    Chloride      98 - 107 mEQ/L 104    CO2      21 - 31 mEQ/L 24    BUN      7 - 25 mg/dL 40 (H)    Creatinine      0.6 - 1.2 mg/dL 1.1    Glucose      70 - 99 mg/dL 87    Calcium      8.6 - 10.3 mg/dL 9.5    eGFR      >=60.0 mL/min/1.73m*2 56.6 (L)    Anion Gap      3 - 15 mEQ/L 10      Component      Latest Ref Rn 4/13/2023   Hemoglobin A1C      <5.7 % 4.4        Component      Latest Ref Rn 4/14/2023   Triglycerides      30 - 149 mg/dL 44    Cholesterol      <=200 mg/dL 194    HDL      >=55 mg/dL 49 (L)    LDL Calculated      <=100 mg/dL 136 (H)    Non-HDL, Calculated      mg/dL 145    RISK      <=5.0  4.0       Component      Latest Ref [facility] 4/13/2023   High Sens Troponin I      <15.0 pg/mL 23.0 (H)       Component      Latest Ref Rn 4/14/2023   Ferritin      11 - 250 ng/mL 75      Component      Latest Ref Rn 4/14/2023   Iron      35 - 150 ug/dL 29 (L)    TIBC      270 - 460 ug/dL 245 (L)    UIBC      180 - 360 ug/dL 216    Iron Saturation      15 - 45 % 12 (L)        Component      Latest Ref Rn 6/27/2022 4/13/2023   BNP      <=100 pg/mL 111 (H)  105 (H)                          ---    ECG (8/2/2024, personally reviewed):   Sinus tachycardia   Left ventricular hypertrophy with repolarization abnormality    HR: 117 bpm     ---        Lower extremity arterial duplex and ENRRIQUE August 10, 2024:  Right ankle ENRRIQUE (DP): 0.74.  Right ankle ENRRIQUE (PT): 0.88.  Left ankle ENRRIQUE (DP): 0.90.  Left ankle ENRRIQUE (PT): 0.62.    Findings concerning for hemodynamically significant stenosis within the  bilateral popliteal arteries and distal left femoral artery. There are also  findings which can be seen with inflow stenosis at the level left common femoral  artery. CTA of the abdomen and pelvis with lower extremity runoff could be  performed for further evaluation if clinically indicated.    CT Chest / Abdomen / Pelvis w/ Contrast  (7/25/2024, personally reviewed):     Lung bases: Left-sided pleural effusion, left parenchymal consolidation, and  findings of interstitial lung disease.  More nodular density in the medial  aspect of the left lower lobe measuring 1.6 x 1.9 cm.  Cardiomegaly with a  pericardial effusion.     Liver: The liver is normal in size.  There is no focal mass.  Hepatic veins and  portal veins are patent without focal filling defects to suggest thrombosis..     Gallbladder:  Small dependent gallstones.  No gallbladder wall thickening..     Spleen: Spleen is normal in size without focal mass lesion..     Pancreas: The pancreas is normal without focal mass, parenchymal atrophy, or  pancreatic duct dilation..     Adrenals: The adrenals are normal bilaterally without focal mass..     Kidneys, ureters and bladder:  Symmetric enhancement and excretion..  Left lower  pole cystic lesion measures 3.2 cm in maximal dimension.  This measures greater  than simple fluid density.  This measured 60 Hounsfield units on the noncontrast  CT from 2020.  It measures 36 Hounsfield units on today's study.  Therefore it  is most in keeping with a hemorrhagic/proteinaceous cyst.  Additional  hypodensities too small to characterize.     Retroperitoneal structures: There is no abdominal aortic aneurysm.  Atherosclerotic calcification.  There is no retroperitoneal adenopathy or  retroperitoneal mass..     Bowel and mesentery: No evidence of a focal inflammatory or obstructive process.  Moderate fecal retention throughout the colon.  There are a few fluid-filled  small bowel loops in the pelvis, nonspecific.     Lymph nodes: No pathologic lymphadenopathy..     Pelvis: The uterus is normal in size.  The ovaries are normal.  There is no  adnexal mass or pelvic free fluid.     Bones: Within normal limits.    No evidence for pulmonary embolism.  Persistent left-sided pleural effusion and airspace disease at the left lung  base.  Underlying interstitial  lung disease.     TTE (7/19/2024, personally reviewed):   Mild increased wall thickness with normal left ventricular cavity and preserved systolic function. EF 65%. No wall motion abnormalities.   Normal right ventricular size with preserved systolic function.   IVC normal in size with >50% respiratory variation consistent with normal right sided filling pressures.   Small pericardial effusion. No evidence of hemodynamic compromise with normal respiratory variation and no chamber collapse. Prominent epicardial fat.   Compared with previous study of 5/23/2024, small pericardial effusion persists.      PET/CT Skull-to-Thigh (6/4/2024):   An approximately 1.6 cm pulmonary nodule at the medial left lung base is  significantly FDG avid at max SUV 8.5.  This is nonspecific and can be seen in  the setting of benign pathology however malignancy is a concern.     There is more mild to moderate nonspecific uptake localizing to lita-fissural  nodules particularly on the left.     There is  moderate nonspecific left hilar and mediastinal dawson uptake.     No additional suspicious FDG avid findings appreciated.     CTA Chest (5/21/2024):   IMPRESSION:  1.  No evidence of acute pulmonary embolism.  2.  Multiple new perifissural left lower lobe nodules, suspicious for a  neoplastic process. PET/CT and or tissue sampling is recommended.  3.  Chronic interstitial lung disease in an NSIP pattern, with overall slightly  progressed appearance since March 2023. Pulmonary consultation is recommended.  4.  Stable cardiomegaly and large pericardial effusion.  5.  Mediastinal and supraclavicular adenopathy, similar to prior study, also  indeterminate, which could be further evaluated at time of PET/CT.     CTA Chest (4/16/2024):   IMPRESSION:  1. No evidence of pulmonary embolism.  2. Stable cardiomegaly and large pericardial effusion. Cardiology consultation  suggested.  3. Emphysema with bilateral lower lobe groundglass opacity and  bronchiectasis in  an NSIP pattern, which can be seen with scleroderma. Pulmonary consultation  suggested.  4. Stable 16 mm left lower lobe masslike density which may represent  atelectasis, lung cancer or lymphoma. When clinically appropriate PET/CT  suggested.  5. Stable prominence of the main pulmonary artery which can be seen with  pulmonary hypertension     TTE (5/23/2024, personally reviewed):  Mild increased wall thickness with normal left ventricular cavity and preserved systolic function. EF 65%. No wall motion abnormalities.   Normal right ventricular size with preserved systolic function.   Tiny IVC with >50% respiratory variation consistent with low-normal right sided filling pressures.   Small pericardial effusion, predominantly adjacent to right atrium. No evidence of hemodynamic compromise with normal respiratory variation and no chamber collapse. Prominent epicardial fat.   Compared with previous study of 4/10/24, small pericardial effusion persists.      TTE (4/10/2024, personally reviewed):  1. Normal left ventricular cavity size with mild concentric left ventricular hypertrophy. Normal systolic function. EF: 60%. No regional wall motion abnormalities. Cannot determine diastolic function. Global longitudinal strain not reported due to technical limitations of study.   2. Aortic valve has three cusps. Trace aortic regurgitation. Aortic valve and root sclerosis.  3. Thickened mitral valve leaflets. Trace mitral valve regurgitation. Normal left atrial size.   4. Normal right ventricular structure and function. Trace tricuspid valve regurgitation with estimated RVSP: 51 mmHg (assuming right atrial pressure of 3 mmHg). Normal right atrial size. Pulmonic valve structurally normal. Trace pulmonic valve regurgitation. Pulsed wave Doppler of the RVOT systolic profile shows decreased acceleration times and geometry consistent with mildly elevated PVR.  5. IVC size is normal with > 50% inspiratory collapse  consistent with normal right atrial pressure. Small-to-moderate sized, circumferential pericardial effusion. No echocardiographic evidence for cardiac tamponade.   6. Compared to previous study from 3/4/2024, no significant change. Pericardial effusion size is similar, estimated right atrial pressure lower.           CTA Abd / Pelvis (3/13/2024, personally reviewed):   There is dilatation of the of proximal/mid small bowel with relatively abrupt  transition left hemiabdomen, likely obstruction. Questionable lesion at the  transition point. This could be better evaluated with CT or MRI enterography.     Aorta and major branches patent. Superior mesenteric artery patent without  significant stenosis.  Suspect moderate stenosis of the origins of the of renal arteries bilaterally.     Large pericardial effusion, no change.     Patchy opacities lower lungs, similar to prior.      Coronary CTA (3/11/2024):   1. Two-vessel coronary artery disease as above that is moderate in severity.  CT  FFR is normal..  2. There is mitral annular calcification and aortic sclerosis.  The right atrium  is enlarged.  There is left ventricular hypertrophy.  There is a moderate to  large circumferential pericardial effusion.  3. Normal left ventricular wall motion and systolic function.  4. Coronary calcium score 313, 96th percentile     TTE (3/4/2024, personally reviewed):   Normal-sized left ventricle. Mild left ventricular hypertrophy. Preserved systolic function. LVEF 60%. No regional wall motion abnormalities. Grade II diastolic dysfunction.  Normal global longitudinal strain (-20%).  The aortic valve is not well seen.  Cannot determine the number of cusps.  Sclerotic leaflets.  No aortic stenosis.  Trace aortic insufficiency.  Normal sized aortic root.  The visualized portion of the ascending aorta is normal in size.  The mitral valve leaflets are thickened. Mild mitral annular calcification. Trace mitral regurgitation.   Mildly  dilated left atrium.  Normal-sized right ventricle. Normal right ventricular systolic function.  Thickened tricuspid valve. There is mild tricuspid regurgitation with estimated RVSP of 46 mmHg.   Pulmonic valve is not well visualized. Trace pulmonic regurgitation.   Mildly dilated right atrium.  IVC is enlarged with <50% respiratory variation consistent with elevated right atrial filling pressure (at least 15 mmHg).   Moderate, circumferential, circumferential pericardial effusion.  The largest pocket is located inferolaterally.  Elevated right atrial pressure.  No other clear echocardiographic features of increased pericardial pressure.  Correlate clinically.   Compared to previous TTE from 6/21/2023, pericardial effusion now moderate in size.  Right atrial pressure now elevated.         CTA Chest (3/3/2024):   IMPRESSION:  1.  No evidence of acute pulmonary embolism to the level of the segmental  branches.  2.  Moderate to large pericardial effusion measuring higher than simple fluid  density.  3.  Lower lobe lung field predominant interstitial thickening with some relative  subpleural sparing, consistent with a chronic interstitial lung disease,  unchanged from the prior study.  4.  Continued bilateral axillary, mediastinal, and bilateral hilar  lymphadenopathy, not definitely changed from the prior study, possibly related  to the patient's sarcoid.  5.  Emphysema.      CTA Chest PE (10/4/2023):  IMPRESSION:  1. No evidence for filling defect or vessel cutoff to suggest pulmonary  embolism.  Enlargement of the pulmonary arteries compatible with pulmonary  arterial hypertension.  2. Changes throughout the lungs as described above which can be seen with  systemic sclerosis/scleroderma.  Underlying pneumonia the lung bases cannot be  entirely excluded in the proper clinical setting.  3.  Additional stable findings as described above.        VQ (9/12/2023):  IMPRESSION:  Intermediate to high probability of pulmonary  embolic disease.     6MWT (7/25/2023, on Sildenafil 20 mg TID):        TTE (6/21/2023, personally reviewed):  1. Normal left ventricular cavity size and mild concentric left ventricular hypertrophy. Normal systolic function. EF: 60%. No regional wall motion abnormalities. Grade I diastolic dysfunction.   2. Aortic valve cusps not well visualized. Trace aortic regurgitation. Aortic valve and root sclerosis.  3. Thickened mitral valve leaflets. Mild mitral annular calcification. Trace mitral regurgitation. Mildly dilated left atrial size.   4. Normal right ventricular structure and function. Mild tricuspid valve regurgitation with estimated RVSP: 55 mmHg (assuming right atrial pressure of 3 mmHg). Normal right atrial size. Pulmonic valve not well visualized. Trace pulmonic valve regurgitation. Pulsed wave Doppler of the RVOT systolic profile show decreased acceleration times ( msec) and late systolic notching consistent with elevated PVR.   5. IVC size is normal with > 50% inspiratory collapse consistent with normal right atrial pressure. Small pericardial effusion without echocardiographic evidence of tamponade.  6. Compared to previous study from 4/14/2023, estimated right ventricular systolic pressure is higher.        TTE (4/14/2023, personally reviewed):   The left ventricular cavity size is normal with mild left ventricular hypertrophy and preserved systolic function. Estimated EF 65%. No regional wall motion abnormalities. Grade I diastolic dysfunction.     The aortic valve is tricuspid. Aortic valve sclerosis. Trace aortic regurgitation.      The visualized portions of the aortic root and ascending aorta are normal in size.     The mitral valve is mildly thickened. Mild mitral annular calcification. Trace mitral regurgitation.       The left atrium is severely dilated.      The right ventricle is normal in size with preserved systolic function     The pulmonic valve is not well seen.     The tricuspid  valve is normal in appearance. mild tricuspid regurgitation with an estimated RVSP of 34 mmHg.       Right atrium is normal in size.     The IVC is small and collapses > 50% with inspiration consistent with normal right atrial pressures.     Small pericardial effusion, mostly posterior.       Compared to previous echocardiogram from 8/22/2022, there is no significant change        TTE (11/28/2022, personally reviewed):   Normal right- and mildly elevated left-sided filling pressures (RA: 3 mmHg, PCWP: 13 mmHg)  Elevated pulmonary artery pressures (60/22(35 mmHg)) in the setting of PCWP: 13 mmHg.   Normal cardiac output and index (CO: 5.1 L/min, CI: 3.2 L/min/m2)  PVR: 4.3 Wood units. SVR: 1840 dynes/sec/cm5      TTE (8/22/2022, personally reviewed):  Normal-sized LV. Normal LV systolic function. Estimated EF 55- 60%. Wall motion appears grossly normal. Mild concentric left ventricular hypertrophy. Grade I LV diastolic dysfunction.  Pulmonic valve not well visualized. Grossly normal pulmonic valve structure. Trace pulmonic valve regurgitation. No pulmonic valve stenosis.  Aortic valve not well visualized. Aortic valve not well seen but likely trileaflet. Focal aortic valve calcification present. Sclerotic aortic valve leaflets. Mild aortic valve regurgitation. No aortic valve stenosis.  RV not well visualized. Normal-sized RV. Normal RV systolic function.  Normal-sized RA.  There is a small loculated pericardial effusion anterior to the right atrium. No cardiac tamponade.  Sclerotic mitral valve. Mitral valve calcification of the anterior leaflet present.  Trace mitral valve regurgitation.  No significant mitral valve stenosis.  Normal-sized IVC. IVC demonstrates normal respiratory collapse.  Tricuspid valve structure is grossly normal. Mild to moderate tricuspid valve regurgitation.  Estimated RVSP = 50-55 mmHg.  Mildly dilated LA.  No previous echocardiogram available for comparison.     TTE (4/21/2022, outside  study):  This result has an attachment that is not available.     A complete transthoracic echocardiogram (including 2D, color flow   Doppler, spectral Doppler and M-mode imaging) was performed using the   standard protocol. The study quality was adequate.     The left ventricle is normal in size. There is moderately increased   wall thickness consistent with moderate concentric hypertrophy.   Endocardium is incompletely visualized, but no regional wall motion   abnormalities are noted in the visualized segments. Normal left   ventricular ejection fraction. The left ventricular ejection fraction by   visual estimate is 55% to 60%.     The right ventricle is normal in size. There is normal function of the   right ventricle.     The left atrium is normal in size. Left atrial volume is 58 mL.     The right atrial volume measures 52 mL. The right atrial volume index   measures 34 mL/m2.     There is trace mitral regurgitation. There is no mitral stenosis.     The aortic valve is trileaflet. There is mild aortic valve thickening.   There is no aortic stenosis. There is trace aortic regurgitation.     There is mild to moderate tricuspid regurgitation. Pulmonary artery   systolic pressure measures 52 mmHg. The pulmonary artery systolic pressure   is moderately elevated.     The aorta measures 3.2 cm at the sinus of Valsalva. The proximal   segment of the ascending aorta is mildly enlarged. The proximal segment of   the ascending aorta measures 3.4 cm. The proximal segment of the ascending   aorta measures 2.2 cm/m2, indexed for body surface area.     Trivial pericardial effusion present. There is no echocardiographic   evidence of cardiac tamponade.     The inferior vena cava is normal in size (diameter <21 mm) and   decreases >50% in size with inspiration, suggesting a normal right atrial   pressure of 3 mmHg (range 0-5 mm Hg).     Compared to the prior study of 3/13/2020, there are no significant   changes.        PFT  (3/23/2022):      PFT (7/14/2021):      CT Chest (5/2/2021, outside study):  CLINICAL INFORMATION: Systemic sclerosis. Interstitial lung disease. Groundglass nodule     PROCEDURE: Unenhanced CT of the chest was performed using thin section reconstructions. Images were obtained on inspiration and expiration.     COMPARISON: June and August 2020     FINDINGS:     LUNGS, PLEURA:     Groundglass opacities with reticulation with subpleural sparing in the lower lobes, without honeycombing or architectural distortion, unchanged.     Right upper lobe groundglass nodule, image 114 of series 3, 8 x 11 mm, previously 6 mm.     Expiratory images are of diagnostic quality and do not demonstrate evidence of clinically significant air trapping.     CARDIOVASCULAR, MEDIASTINUM, THYROID: Coronary calcifications. Trace pericardial effusion.     LYMPH NODES: Subcentimeter mediastinal lymph nodes, unchanged. Unchanged axillary lymph nodes.     SKELETON, CHEST WALL: No destructive bone lesion     UPPER ABDOMEN: Patulous esophagus. 3 mm right renal stone.       RHC (9/02/2020):  RA   8 mmHg   RV   40/5 mmHg   PA (mean)  44/16 (25) mmHg   PCWP   12 mmHg   CO   4.65 lpm (Thermodilution)   CI   2.65 lpm/m-squared   SVI    33 ml/beat/m-squared (lower limit normal 29)   PVR   2.8 mmHg/l/min (Wood units)   SVR   2064 dyne-sec-cm-5         PFT (3/18/2020):      TTE (3/13/2020, outside study):  · The study quality was good.   · The left ventricle is normal in size. Top normal left ventricular wall   thickness. There are no segmental wall motion abnormalities. The left   ventricular ejection fraction is 55-60% by visual estimate and 3D   echocardiography. Normal left ventricular ejection fraction.   · There is grade I (mild) LV diastolic dysfunction.   · The right ventricle is normal in size. There is normal function of the   right ventricle.   · The right atrium is mildly dilated.   · There is mild mitral valve anterior leaflet thickening. There  is mild   mitral valve leaflet calcification. There is flattening of the mitral   leaflets without raphael prolapse. There is trace mitral regurgitation.   · The aortic valve is trileaflet. There is mild thickening of the aortic   valve. There is mild calcification of the aortic valve. There is no aortic   /stenosis. There is trace aortic regurgitation.   · There is mild tricuspid valve leaflet thickening. There is mild to   moderate tricuspid regurgitation. The pulmonary artery systolic pressure   is moderately elevated. Pulmonary artery systolic pressure measures 50   mmHg. There is mid-systolic notching of the RVOT VTI consistent with   elevated pulmonary vascular resistance.   · The inferior vena cava is normal in size (diameter <21 mm) and decreases   >50% in size with inspiration, suggesting a normal right atrial pressure   of 3 mmHg (range 0-5 mm Hg).   · Trivial loculated pericardial effusion present adjacent to the right   ventricle and adjacent to the right atrium.          Assessment / Plan:     1. Chest Pain:  - Given pattern and character, I believe this is musculoskeletal / serous pain from her autoimmune disease. There may be an element of myopericarditis.  - She reports that pain has not responded to increased in steroids in the past   - Non-ACS chest pain and known non-obstructive CAD as recently as March 2024 CCTA.     2. Pulmonary Hypertension (WHO Group I, NYHA/WHO FC III):   - Occurring in the background of Systemic Scleroderma with ILD. August 2022 TTE showed normal RV size and function, but progressive exertional decline, worsened DLCO and resting tachycardia suggest there may be progression of her pulmonary vascular disease and limitation of cardiac output. This was proven with 11/2022 RHC showing mean PA 35 mmHg (with PCWP 13 mmHg) and PVR 4.3 Wood units. Recent TTEs have shown appropriate RV:PA coupling with normal RV size and systolic function.   - She is hypervolemic on exam  -Discordant  weights, no recorded I&Os  -Ethacrynic acid has been held since 8/23 due to increase in sCr. She examines intravascularly overloaded. sCr improved.    -Awaiting AM labs.   -BNP elevated resume diuresis, ethacrynic acid 50 mg daily. Please continue daily standing weights.  Please start recording I's and O's.    - Continue Sildenafil 20 mg TID   - She should continue uninterrupted Macitentan 10 mg daily -her home medication has been brought in and she is receiving it.    3. HTN:   - Continue Amlodipine.     4. Systemic Scleroderma / Raynaud's Disease:   - Per Rheumatology (Dr. Trevizo).      5. ILD:   - Per Pulmonology and Rheumatology (Dr. Trevizo). She has not tolerated trials of Mycophenolate. She briefly received Tocilizumab.     6. PE:   - She has had elevated probability on V/Q scans, but CTA Chest has consistently not shown evidence for acute or chronic thromboembolism. Apixaban has been discontinued.     7. CAD:   - Two-vessel non-obstructive disease on March 2024 CCTA. She is continued on Atorvastatin 40 mg daily.     8. PAD with Toe Ulcerations  -S/P left PTA dSFA/TP and DCB to popliteal arteries 8/14/24. Followed by vascular surgery.   -Hematoma noted with drop in hgb - clopidogrel was held.  -She received 1 unit of blood with improvement in hgb.   -continue Clopidogrel and Atorvastatin.     9. Lung Mass  - Pathology from 8/8 biopsy with atypical glandular cells, possibly consistent with adenocarcinoma  - Management per pulmonology    RAS Marcos  8/27/2024

## 2024-08-28 PROCEDURE — 63700000 HC SELF-ADMINISTRABLE DRUG: Performed by: INTERNAL MEDICINE

## 2024-08-28 PROCEDURE — 63700000 HC SELF-ADMINISTRABLE DRUG: Performed by: NURSE PRACTITIONER

## 2024-08-28 PROCEDURE — 99232 SBSQ HOSP IP/OBS MODERATE 35: CPT | Performed by: INTERNAL MEDICINE

## 2024-08-28 PROCEDURE — 99233 SBSQ HOSP IP/OBS HIGH 50: CPT | Performed by: INTERNAL MEDICINE

## 2024-08-28 PROCEDURE — 63700000 HC SELF-ADMINISTRABLE DRUG: Performed by: STUDENT IN AN ORGANIZED HEALTH CARE EDUCATION/TRAINING PROGRAM

## 2024-08-28 PROCEDURE — 63600000 HC DRUGS/DETAIL CODE: Mod: JZ | Performed by: STUDENT IN AN ORGANIZED HEALTH CARE EDUCATION/TRAINING PROGRAM

## 2024-08-28 PROCEDURE — 25000000 HC PHARMACY GENERAL: Performed by: INTERNAL MEDICINE

## 2024-08-28 PROCEDURE — 21400000 HC ROOM AND CARE CCU/INTERMEDIATE

## 2024-08-28 PROCEDURE — 93005 ELECTROCARDIOGRAM TRACING: CPT | Performed by: NURSE PRACTITIONER

## 2024-08-28 PROCEDURE — 25800000 HC PHARMACY IV SOLUTIONS: Performed by: INTERNAL MEDICINE

## 2024-08-28 PROCEDURE — 63600000 HC DRUGS/DETAIL CODE: Mod: JZ | Performed by: NURSE PRACTITIONER

## 2024-08-28 PROCEDURE — 25800000 HC PHARMACY IV SOLUTIONS: Performed by: STUDENT IN AN ORGANIZED HEALTH CARE EDUCATION/TRAINING PROGRAM

## 2024-08-28 RX ORDER — ETHACRYNIC ACID 25 MG/1
50 TABLET ORAL 2 TIMES DAILY
Status: COMPLETED | OUTPATIENT
Start: 2024-08-28 | End: 2024-08-28

## 2024-08-28 RX ORDER — GUAIFENESIN 600 MG/1
600 TABLET, EXTENDED RELEASE ORAL 2 TIMES DAILY
Status: DISCONTINUED | OUTPATIENT
Start: 2024-08-28 | End: 2024-09-03 | Stop reason: HOSPADM

## 2024-08-28 RX ORDER — METOCLOPRAMIDE 10 MG/1
5 TABLET ORAL 3 TIMES DAILY PRN
Status: DISCONTINUED | OUTPATIENT
Start: 2024-08-28 | End: 2024-09-03 | Stop reason: HOSPADM

## 2024-08-28 RX ADMIN — SILDENAFIL 20 MG: 20 TABLET, FILM COATED ORAL at 21:13

## 2024-08-28 RX ADMIN — GLYCERIN 1 DROP: .002; .002; .01 SOLUTION/ DROPS OPHTHALMIC at 13:44

## 2024-08-28 RX ADMIN — AMLODIPINE BESYLATE 10 MG: 10 TABLET ORAL at 09:32

## 2024-08-28 RX ADMIN — METOCLOPRAMIDE 5 MG: 10 TABLET ORAL at 15:31

## 2024-08-28 RX ADMIN — ACETAMINOPHEN 975 MG: 325 TABLET ORAL at 23:21

## 2024-08-28 RX ADMIN — GLYCERIN 1 DROP: .002; .002; .01 SOLUTION/ DROPS OPHTHALMIC at 17:25

## 2024-08-28 RX ADMIN — ONDANSETRON 4 MG: 2 INJECTION INTRAMUSCULAR; INTRAVENOUS at 17:15

## 2024-08-28 RX ADMIN — ACETAMINOPHEN 975 MG: 325 TABLET ORAL at 11:55

## 2024-08-28 RX ADMIN — PANTOPRAZOLE SODIUM 40 MG: 40 TABLET, DELAYED RELEASE ORAL at 09:32

## 2024-08-28 RX ADMIN — LIDOCAINE 4% 1 PATCH: 40 PATCH TOPICAL at 09:59

## 2024-08-28 RX ADMIN — LIDOCAINE 4% 1 PATCH: 40 PATCH TOPICAL at 09:58

## 2024-08-28 RX ADMIN — GUAIFENESIN 600 MG: 600 TABLET ORAL at 17:14

## 2024-08-28 RX ADMIN — WHITE PETROLATUM: 1.75 OINTMENT TOPICAL at 21:15

## 2024-08-28 RX ADMIN — ETHACRYNATE SODIUM 50 MG: 50 INJECTION, POWDER, LYOPHILIZED, FOR SOLUTION INTRAVENOUS at 17:22

## 2024-08-28 RX ADMIN — SILDENAFIL 20 MG: 20 TABLET, FILM COATED ORAL at 13:44

## 2024-08-28 RX ADMIN — CYANOCOBALAMIN TAB 1000 MCG 1000 MCG: 1000 TAB at 09:32

## 2024-08-28 RX ADMIN — ACETAMINOPHEN 975 MG: 325 TABLET ORAL at 05:37

## 2024-08-28 RX ADMIN — ONDANSETRON 4 MG: 2 INJECTION INTRAMUSCULAR; INTRAVENOUS at 05:38

## 2024-08-28 RX ADMIN — GLYCERIN 1 DROP: .002; .002; .01 SOLUTION/ DROPS OPHTHALMIC at 21:15

## 2024-08-28 RX ADMIN — GLYCERIN 1 DROP: .002; .002; .01 SOLUTION/ DROPS OPHTHALMIC at 09:58

## 2024-08-28 RX ADMIN — ACETAMINOPHEN 975 MG: 325 TABLET ORAL at 17:56

## 2024-08-28 RX ADMIN — LEVOTHYROXINE SODIUM 100 MCG: 0.1 TABLET ORAL at 05:38

## 2024-08-28 RX ADMIN — GABAPENTIN 200 MG: 100 CAPSULE ORAL at 13:43

## 2024-08-28 RX ADMIN — MINERAL OIL, WHITE PETROLATUM: .03; .94 OINTMENT OPHTHALMIC at 21:14

## 2024-08-28 RX ADMIN — GABAPENTIN 200 MG: 100 CAPSULE ORAL at 09:32

## 2024-08-28 RX ADMIN — ONDANSETRON 4 MG: 2 INJECTION INTRAMUSCULAR; INTRAVENOUS at 11:10

## 2024-08-28 RX ADMIN — Medication 3 MG: at 23:21

## 2024-08-28 RX ADMIN — ETHACRYNIC ACID 50 MG: 25 TABLET ORAL at 09:33

## 2024-08-28 RX ADMIN — GABAPENTIN 200 MG: 100 CAPSULE ORAL at 21:13

## 2024-08-28 RX ADMIN — CLOPIDOGREL 75 MG: 75 TABLET ORAL at 09:33

## 2024-08-28 RX ADMIN — BENZONATATE 100 MG: 100 CAPSULE ORAL at 13:44

## 2024-08-28 RX ADMIN — SALINE NASAL SPRAY 2 SPRAY: 1.5 SOLUTION NASAL at 10:00

## 2024-08-28 RX ADMIN — BENZOCAINE 6 MG-MENTHOL 10 MG LOZENGES 1 LOZENGE: at 09:38

## 2024-08-28 RX ADMIN — ONDANSETRON 4 MG: 2 INJECTION INTRAMUSCULAR; INTRAVENOUS at 23:20

## 2024-08-28 RX ADMIN — BENZOCAINE 6 MG-MENTHOL 10 MG LOZENGES 1 LOZENGE: at 00:18

## 2024-08-28 RX ADMIN — SILDENAFIL 20 MG: 20 TABLET, FILM COATED ORAL at 09:32

## 2024-08-28 RX ADMIN — SENNOSIDES 2 TABLET: 8.6 TABLET, FILM COATED ORAL at 09:32

## 2024-08-28 RX ADMIN — SODIUM CHLORIDE 125 MG: 9 INJECTION, SOLUTION INTRAVENOUS at 09:40

## 2024-08-28 ASSESSMENT — COGNITIVE AND FUNCTIONAL STATUS - GENERAL
CLIMB 3 TO 5 STEPS WITH RAILING: 3 - A LITTLE
WALKING IN HOSPITAL ROOM: 3 - A LITTLE
STANDING UP FROM CHAIR USING ARMS: 3 - A LITTLE
MOVING TO AND FROM BED TO CHAIR: 3 - A LITTLE

## 2024-08-28 NOTE — PROGRESS NOTES
"   Pulmonary Progress Note       SUBJECTIVE    Seen as follow up     Explained and discussed the result of her bronchoscopy.    Atypical cells had been noted. Explained to pt that monitoring/surveillance would be recommended. Pt was receptive.    OBJECTIVE        Vital Signs:   Visit Vitals  BP (!) 119/56 (BP Location: Right upper arm)   Pulse 92   Temp 36.5 °C (97.7 °F) (Oral)   Resp 18   Ht 1.676 m (5' 6\")   Wt 54.9 kg (121 lb)   LMP  (LMP Unknown)   SpO2 97%   BMI 19.53 kg/m²       I/O's:    Intake/Output Summary (Last 24 hours) at 8/28/2024 1626  Last data filed at 8/28/2024 1530  Gross per 24 hour   Intake 365 ml   Output 400 ml   Net -35 ml       Medications:    Reviewed. Pertinent medications as below.     acetaminophen  975 mg oral q6h SPENCER    amLODIPine  10 mg oral q AM    atorvastatin  40 mg oral Daily (6p)    [Provider Managed Hold] bisacodyL  10 mg rectal TID    clopidogreL  75 mg oral Daily    clotrimazole  1 Applicatorful vaginal Nightly    cyanocobalamin  1,000 mcg oral Daily    ethacrynic acid (EDECRIN) 50 mg in sodium chloride 0.9 % 50 mL IVPB  50 mg intravenous Once    ferric gluconate (FERRLECIT) 125 mg in sodium chloride 0.9 % 100 mL IVPB  125 mg intravenous Daily    gabapentin  200 mg oral TID    levothyroxine  100 mcg oral Daily (6:30a)    lidocaine  1 patch Topical Daily    lidocaine  1 patch Topical Daily    lidocaine  1 patch Topical Daily    macitentan  10 mg oral Daily    methylnaltrexone  0.075 mg/kg subcutaneous Every other day    [Provider Managed Hold] mineral oil  133 mL rectal BID    ondansetron  4 mg intravenous q6h SPENCER    pantoprazole  40 mg oral Daily    artificial tears  1 drop Both Eyes q4h while awake    senna  2 tablet oral BID    sildenafiL (pulm.hypertension)  20 mg oral TID    sodium chloride  2 spray Each Nostril Daily    white petrolatum   Topical BID    white petrolatum-mineral oil   Both Eyes Nightly       Anti-infectives (From admission, onward)      Start     Dose/Rate " Route Frequency Ordered Stop    08/26/24 0045  clotrimazole (LOTRIMIN) 2 % vaginal cream 1 Applicatorful         1 Applicatorful vaginal Nightly 08/25/24 3078 08/28/24 6976            PHYSICAL EXAMINATION        General: The patient is in no acute distress. Chronically ill appearing.   HEENT: Mucous membranes are moist.  Sclera are anicteric.  Neck: Supple.  No cervical lymphadenopathy  Cardiovascular: S1 and S2 are present.  There are no murmurs.  Lungs: Clear to auscultation bilaterally, minimal bibasilar crackles   Abdomen: Soft, nontender and nondistended  Extremities: Cool and dry   Neuro: Awake and alert.  Spontaneously moving all extremities       Diagnostic Data      Labs:    I have personally reviewed all pertinent patient laboratory results. Labs of note discussed below:    ABG Results         04/13/23 1949    Source Of Oxygen nc          CMP Results         08/13/24 08/09/24 07/25/24     0958 1938 1420     140 139    K 3.8 3.6 4.3    Cl 106 106 103    CO2 29 24 27    Glucose 113 97 86    BUN 23 18 17    Creatinine 1.0 1.3 1.1    Calcium 9.4 9.5 10.6    Anion Gap 6 10 9    AST -- -- 15    ALT -- -- 8    Albumin -- -- 4.3    EGFR >60.0 46.3 56.6           Comment for K at 1420 on 07/25/24: Results obtained on plasma. Plasma Potassium values may be up to 0.4 mEQ/L less than serum values. The differences may be greater for patients with high platelet or white cell counts.    Comment for EGFR at 0958 on 08/13/24: Calculation based on the Chronic Kidney Disease Epidemiology Collaboration (CKD-EPI) equation refit without adjustment for race.    Comment for EGFR at 1938 on 08/09/24: Calculation based on the Chronic Kidney Disease Epidemiology Collaboration (CKD-EPI) equation refit without adjustment for race.    Comment for EGFR at 1420 on 07/25/24: Calculation based on the Chronic Kidney Disease Epidemiology Collaboration (CKD-EPI) equation refit without adjustment for race.          CBC Results          08/13/24 08/09/24 07/25/24     0958 1938 1420    WBC 5.64 9.38 10.29    RBC 4.22 4.49 4.64    HGB 10.3 10.7 11.2    HCT 34.3 36.7 37.3    MCV 81.3 81.7 80.4    MCH 24.4 23.8 24.1    MCHC 30.0 29.2 30.0     285 331          Microbiology Results       Procedure Component Value Units Date/Time    Sputum culture / smear Bronchioalveolar Lavage, Left Lower Lobe [[id number]] Collected: 08/08/24 1315    Specimen: Bronch Lavage from Bronchioalveolar Lavage, Left Lower Lobe Updated: 08/10/24 0844    Narrative:      The following orders were created for panel order Sputum culture / smear Bronchioalveolar Lavage, Left Lower Lobe.  Procedure                               Abnormality         Status                     ---------                               -----------         ------                     Sputum Gram Stain (Lab O...[[id number]]                      Final result               Sputum Culture (Lab Only...[[id number]]  Normal              Final result                 Please view results for these tests on the individual orders.    Fungal Stain Bronchioalveolar Lavage, Left Lower Lobe [[id number]] Collected: 08/08/24 1315    Specimen: Bronch Lavage from Bronchioalveolar Lavage, Left Lower Lobe Updated: 08/09/24 1311     Fungus Stain No fungal elements seen    Fungal Culture Bronchioalveolar Lavage, Left Lower Lobe [[id number]]  (Normal) Collected: 08/08/24 1315    Specimen: Bronch Lavage from Bronchioalveolar Lavage, Left Lower Lobe Updated: 08/09/24 1501     Fungal Culture No Fungus Isolated to Date    Sputum Gram Stain (Lab Only) Bronchioalveolar Lavage, Left Lower Lobe [[id number]] Collected: 08/08/24 1315    Specimen: Bronch Lavage from Bronchioalveolar Lavage, Left Lower Lobe Updated: 08/08/24 1635     Gram Stain Result No WBC Seen      No Epithelial Cells Seen      No organisms seen    AFB stain Bronchioalveolar Lavage, Left Lower Lobe [[id number]]  (Normal) Collected: 08/08/24 1315    Specimen: Bronch Lavage  from Bronchioalveolar Lavage, Left Lower Lobe Updated: 08/09/24 1303     AFB Stain No acid fast bacilli seen    AFB Culture Bronchioalveolar Lavage, Left Lower Lobe [[id number]]  (Normal) Collected: 08/08/24 1315    Specimen: Bronch Lavage from Bronchioalveolar Lavage, Left Lower Lobe Updated: 08/10/24 1001     AFB Culture No Acid Fast Bacilli Isolated to Date    Sputum Culture (Lab Only) Bronchioalveolar Lavage, Left Lower Lobe [[id number]]  (Normal) Collected: 08/08/24 1315    Specimen: Bronch Lavage from Bronchioalveolar Lavage, Left Lower Lobe Updated: 08/10/24 0844     Culture No growth at 48 hours          Imaging:    I have reviewed all pertinent imaging.      ASSESSMENT AND PLAN      2 y.o. 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             Cutaneous systemic scleroderma with subsequent ILD and Group 1 pulmonary hypertension  Left lower lobe nodule, s/p ION navigational bronchoscopy with biopsy showing a small focus of atypical glands.   DPLD, has not tolerated trials of Mycophenolate. On Tocilizumab.     - Bronch results explained to pt.Will need Op monitoring/repeat Cts going forward  -pain control per primary   -consider gentle diuresis   -outpatient follow up with pulmonary        Please page [phone] with any questions/concerns        Paolo Conti MD  PGY-5  Pulmonary/Critical Care Fellow  8/28/2024

## 2024-08-28 NOTE — PLAN OF CARE
PCP    SARA TRAN  Patient Information    Patient Name   Arielle Felix    Address   [address] [address]    Race   Black or                     Patient Legal Name   Arielle Felix    Legal Sex   Female    Date of Birth   1960                    Room   0155    Ethnic Group   Not , /a, or [place] origin    Language   English                    MRN   [id number]    Phone Numbers   Hm: [phone] Cell: [phone]    PCP   Sara Tran MD                      Patient Contacts    Name Relation Home Work Mobile   mauricio french Daughter   [phone]   Wagner Khan Daughter   [phone]     Documents Filed to Patient    Power of  Living Will Clinical Unknown Study Attachment Consent Form ABN Waiver After Visit Summary Lab Result Scan Code Status [facility] MyChart Status Advance Care Planning    Not on File  Not on File  Not on File  Not on File  Not on File  Filed  Filed  Not on File  FULL [Updated on 08/08/24 1735]  Pending Jump to the Activity      Auth/Cert Information    Open auth/cert linked to hospital account [id number]      Bed Days    Further reviews needed: Yes    Open utilization review      Primary Coverage: CIGNA/CIGNA MEDICARE ADVANTAGE  Next review: None     No nights documented for this coverage.    Update bed days for this coverage      Secondary Coverage: [employer]  Next review: None     No nights documented for this coverage.    Update bed days for this coverage     Admission Information    Current Information    Attending Provider Admitting Provider Admission Type Admission Status   Ba Figueroa MD Aiya, Utsav, MD Elective Confirmed Admission          Admission Date/Time Discharge Date Hospital Service Auth/Cert Status   08/08/24  1005  Internal Medicine Not Needed          Hospital Area Unit Room/Bed    [facility] 1S 0155/0155             Discharge Disposition Discharge Destination   Home Health Care - Other Home     Hospital Account    Name Acct ID Class Status Primary Coverage   Arielle Felix [id number] Inpatient Open CIGNA - CIGNA MEDICARE ADVANTAGE          Guarantor Account (for Hospital Account #[id number])    Name Relation to Pt Service Area Active? Acct Type   Arielle Felix Self [facility] Yes Personal/Family   Address Phone     [address] [address] [phone](H)            Coverage Information (for Hospital Account #[id number])    1. CIGNA/CIGNA MEDICARE ADVANTAGE    F/O Payor/Plan Precert #   CIGNA/CIGNA MEDICARE ADVANTAGE    Subscriber Subscriber #   Arielle Felix [id number]   Address Phone   [address]  [place] [address] [phone]   2. [employer]    F/O Payor/Plan Precert #   [employer]    Subscriber Subscriber #   Arielle Felix [id number]   Address Phone   [address]  [place] [address] [phone]

## 2024-08-28 NOTE — PROGRESS NOTES
Cardiology - Pulmonary Hypertension Progress Note       Interval History:  Examined sitting up in bed, reports her breathing was not good last night and she required higher O2 requirements. She reports her abdomen is distended.    Standing weight up 1 lbs from yesterday, reportedly not urinating well.     Consult background from 8/9  Ms. Felix is a 63 y.o. female with a past medical history of Pulmonary HTN, HTN, Raynaud's Disease, Cutaneous Systemic Scleroderma (dx 1998), ILD, PE (3/2023). She is a patient of Dr. Nguyễn. She was previously followed by Dr. Jos Valdez at [facility]. Echocardiogram from 4/21/2022 showed LVEF: 55-60%, mild aortic valve thickening, pulmonary artery systolic pressure: 52 mmHg and trivial pericardial effusion. LHC and RHC (separate occasions) over the last 5-10 years which showed normal hemodynamics and normal coronaries. She had a RHC 9/02/2020 showing top-normal right sided filling pressures with mild pulmonary hypertension, top-normal PVR and normal pulmonary capillary wedge pressure. The cardiac index was normal, seen below.     On 6/27/2022, [place] [facility] [place]. EKG: NSR without ischemic changes. hsTrop: 12->16, d-dimer: 0.56, CXR showed cardiomegaly and diffuse interstitial prominence.     Dr. Nguyễn ordered echocardiogram, which was completed on 8/22/2022 and showed estimated RVSP = 50-55 mmHg, normal-sized LV with normal LV systolic function. Estimated EF 55- 60%. Wall motion grossly normal, mild concentric left ventricular hypertrophy, grade I LV diastolic dysfunction, probably normal RV size and function.    At her initial visit on 10/11/2022, she reported that she felt very short of breath with minimal activity most of the time. She reports that this started about 1 year prior and had progressively gotten worse. She described PND and bendopnea. She reported that she experienced ankle swelling, worsened over the past year and usually worse towards the end of  the night. She also reported some swelling in her abdomen. She reported daily palpations. I recommended a RHC which was done 11/28/2022 and showed: Normal right sided filling pressures. Mildly elevated left sided filling pressures. Precapillary pulmonary hypertension with mean PA 36 mmHg.    She was hospitalized on 3/19/2023 at [facility] for PE diagnosed on V/Q scan. She reports that she had presented with left arm pain and heaviness. She states that she was started on Apixaban. One week after her last office visit (4/6/2023), [facility], epistaxis and hemoptysis. Echocardiogram showed: left ventricular cavity size normal with mild left ventricular hypertrophy and preserved systolic function. Estimated EF 65%. Chest CTA showed no evidence of PE; Apixaban was discontinued.     She had a repeat echocardiogram (6/2023) which showed: Normal left ventricular cavity size and mild concentric left ventricular hypertrophy. Normal systolic function. EF: 60%. Normal right ventricular structure and function. Mild tricuspid valve regurgitation with estimated RVSP: 55 mmHg. Compared to previous study from 4/14/2023, estimated right ventricular systolic pressure were higher.    She underwent V/Q scan in September 2023, which showed intermediate-to-high probability of pulmonary embolic disease, as a result she completed CTA Chest PE which showed: no evidence for filling defect or vessel cutoff to suggest pulmonary embolism. Enlargement of the pulmonary arteries compatible with pulmonary arterial hypertension was seen.    She reports she started Macitentan around August or September 2023. She stated that she had not been checking her SpO2. She reports she did not notice any difference since starting it. She reported she had been feeling more shortness of breath at times with ADLs.     [facility] on 3/[facility]/2024 with chest pressure, palpitations and nausea. She was noted to have hypoxia  and tachypnea at presentation. She was found to have moderate-to-large pericardial effusion without tamponade. She was started on Colchicine 0.6 mg BID on 3/4/2024 and when she did not improve symptomatically, Prednisone 20 mg daily was added on 3/5/2024. On 3/7/2024, she developed multiple episodes of diarrhea and Colchicine dose was decreased to 0.3 mg. Coronary CTA, done as part of the ischemic workup, showed moderate 2 vessel CAD. She was recommended to begin Atorvastatin 20 mg daily and Clopidogrel 75 mg daily. She initially refused those two medications, but eventually agreed to take Atorvastatin.      She was discharged on Colchicine 0.3 mg for at least 3 months and Prednisone 20 mg for a total two week course until 3/19/2024, then decrease the dose to 10 mg daily for 2 weeks until 4/2/2024, then decrease dose to 5 mg daily for 2 weeks until 4/16/2024. She reports she tapered off Prednisone as instructed and has continued to take Colchicine as prescribed. She completed an echocardiogram (4/10/2024) which showed: Small-to-moderate sized, circumferential pericardial effusion. No echocardiographic evidence for cardiac tamponade. Compared to previous study from 3/4/2024, no significant change. Pericardial effusion size was similar, estimated right atrial pressure lower.      [place] [facility] [place] with increased pleuritic chest pain. On presentation to the ER, she was noted to be hypertensive and tachycardic. Labs showed mildly elevated troponin, elevated BNP and leukocytosis. CT angiography of the chest did not show evidence of PE. It was read as large pericardial effusion, evidence of emphysema, evidence of pulmonary hypertension, 16 mm left lobe lung mass.       She [place] [facility] [place]24 at the request of her Rheumatologist. During her visit there, she was noted to be tachycardic and with some shortness of breath. Her chest pain was improved from her prior admission. On arrival, O2  "saturations were normal. ECG showed: Sinus Tachycardia (HR: 125 bpm). BNP >300, hsTrop 32 ->24. POCUS in the emergency department showed evidence of pericardial effusion with no tamponade physiology. CTA Chest did not show evidence of pulmonary embolism. There were bilateral changes radiographically concerning for volume overload. She received Methylpredinsolone 125 mg in ED. Of note, she was on Prednisone 10 mg daily at home. She was given IV diuresis. She was discharged on Furosemide 20 mg PO PRN daily for fluid weight gain / swelling.     She had brief hospitalization and was discharged on 7/23 for chest pain. She presented to the ED on 7/25/2024 for worsening of her chronic chest pain and 8/5/2024 for facial swelling and chronic chest pain.      She underwent bronchoscopic lung biopsy yesterday, 8/8/2024, with Dr. Niles Rodriguez. Pathology showed atypical glandular cells which may be consistent with adenocarcinoma in situ.     Post-procedure CXR showed increase pulmonary vascular congestion. She was admitted for optimization.            ---    Rheumatology: Dr. Trevizo  Pulmonology: Dr. Wasserman    Past Medical / Surgical History:  Pulmonary HTN, HTN, Raynaud's Disease, Cutaneous Systemic Scleroderma (dx 1998), ILD, PE (3/2023), Follicular Carcinoma of Thyroid, Tenosynovitis, de Quervain, h/o Hernia Repair, h/o Appendicitis, h/o Digit Amputation, H/o Total Thyroidectomy (Dr. Pacheco at [facility]; 4/2013)    Family & Social History: She is , she has two children. She has worked as an .     Tobacco: former 2005  EtOH: never   Illicits: never     Her brother has CAD with stent  1st cousin with SLE  Both parents had \"heart problems\"    Allergies:   Allergies   Allergen Reactions    Aspirin Shortness of breath     Other reaction(s): Unknown  \"stop breathing\"      Ibuprofen Shortness of breath     Stop breathing    Iodine Shortness of breath     Other reaction(s): Unknown    Iodine And Iodide Containing Products " Shortness of breath     ? rash    Morphine Shortness of breath      Reported wamrth of face, improved with benadryl and cold compresses after getting morphine as well as episode of (subjective ) dyspnea, and thought she passed out - did have wamrth and erythema of face with mild edema R>L cheek notably on exam.     Penicillins Shortness of breath     Other reaction(s): Unknown    Sulfa (Sulfonamide Antibiotics) Angioedema    Clindamycin     Hydrochlorothiazide      Other reaction(s): Abdominal Pain   Slow heart rate    Oxycodone      Other reaction(s): Vomiting    Sulfamethoxazole-Trimethoprim      Other reaction(s): Vomiting, Weakness     Latex Rash       Medications:   Current Facility-Administered Medications   Medication Dose Route Frequency Provider Last Rate Last Admin    acetaminophen (TYLENOL) tablet 975 mg  975 mg oral q6h SPENCER Simon Cano DO   975 mg at 08/28/24 0537    amLODIPine (NORVASC) tablet 10 mg  10 mg oral q AM Simon Cano DO   10 mg at 08/27/24 0918    atorvastatin (LIPITOR) tablet 40 mg  40 mg oral Daily (6p) Simon Cano DO   40 mg at 08/22/24 1733    benzocaine-menthol (CEPACOL/CHLORASEPTIC) lozenge 1 lozenge  1 lozenge Mouth/Throat q2h PRN Simon Cano DO   1 lozenge at 08/28/24 0018    benzonatate (TESSALON) capsule 100 mg  100 mg oral 3x daily PRN Simon Cano DO   100 mg at 08/22/24 0841    [Provider Managed Hold] bisacodyL (DULCOLAX) 10 mg suppository 10 mg  10 mg rectal TID Molly Hidalgo CRNP   10 mg at 08/24/24 2015    carboxymethylcellulose (REFRESH PLUS) 0.5 % ophthalmic dropperette 1 drop  1 drop Right Eye 3x daily PRN Simon Cano DO   1 drop at 08/20/24 0817    clopidogreL (PLAVIX) tablet 75 mg  75 mg oral Daily Simon Cano DO   75 mg at 08/27/24 0918    clotrimazole (LOTRIMIN) 2 % vaginal cream 1 Applicatorful  1 Applicatorful vaginal Nightly Arleth Pastor, KARYNA   1  Applicatorful at 08/27/24 2049    cyanocobalamin (VITAMIN B12) tablet 1,000 mcg  1,000 mcg oral Daily Simon Cano DO   1,000 mcg at 08/27/24 0918    cyclobenzaprine (FLEXERIL) tablet 5 mg  5 mg oral 3x daily PRN Simon Cano DO   5 mg at 08/22/24 1404    glucose chewable tablet 16-32 g of dextrose  16-32 g of dextrose oral PRN Simon Cano DO        Or    dextrose 40 % oral gel 15-30 g of dextrose  15-30 g of dextrose oral PRN Simon Cano DO        Or    glucagon (GLUCAGEN) injection 1 mg  1 mg intramuscular PRN Simon Cano DO        Or    dextrose 50 % in water (D50) injection 12.5 g  25 mL intravenous PRN Simon Cano DO        ethacrynic acid (EDECRIN) tablet 50 mg  50 mg oral BID Ba Figueroa MD        famotidine (PEPCID) injection 20 mg  20 mg intravenous Daily PRN Molly Hidalgo CRNP        Or    famotidine (PEPCID) tablet 20 mg  20 mg oral Daily PRN Molly Hidalgo CRNP        ferric gluconate (FERRLECIT) 125 mg in sodium chloride 0.9 % 100 mL IVPB  125 mg intravenous Daily Maddy Cope MD   Stopped at 08/27/24 1030    gabapentin (NEURONTIN) capsule 200 mg  200 mg oral TID Maddy Cope MD   200 mg at 08/27/24 2049    honey (MEDIHONEY) 100 % topical paste   Topical Daily PRN Simon Cano DO   Given at 08/27/24 2316    HYDROmorphone (DILAUDID) tablet 2 mg  2 mg oral q4h PRN Molly Hidalgo CRNP   2 mg at 08/27/24 2057    levothyroxine (SYNTHROID) tablet 100 mcg  100 mcg oral Daily (6:30a) Simon Cano DO   100 mcg at 08/28/24 0538    lidocaine (ASPERCREME) 4 % topical patch 1 patch  1 patch Topical Daily Simon Cano DO   1 patch at 08/27/24 0922    lidocaine (ASPERCREME) 4 % topical patch 1 patch  1 patch Topical Daily Simon Cano DO   1 patch at 08/27/24 0922    lidocaine (ASPERCREME) 4 % topical patch 1 patch  1 patch Topical Daily Simon Cano  DO Hoa   1 patch at 08/27/24 0922    macitentan (OPSUMIT) tablet 10 mg (Patient Owned)  10 mg oral Daily Simon Cano DO   10 mg at 08/27/24 0917    melatonin ODT 3 mg  3 mg oral Nightly PRN Simon Cano DO   3 mg at 08/21/24 2119    methylnaltrexone (RELISTOR) injection vial 4 mg  0.075 mg/kg subcutaneous Every other day Maddy Cope MD   4 mg at 08/25/24 0848    [Provider Managed Hold] mineral oil enema 133 mL  133 mL rectal BID Maddy Cope MD   133 mL at 08/24/24 1243    ondansetron (ZOFRAN) injection 4 mg  4 mg intravenous q6h SPENCER Molly Hidalgo CRNP   4 mg at 08/28/24 0538    pantoprazole (PROTONIX) tablet,delayed release (DR/EC) 40 mg  40 mg oral Daily Molly Hidalgo CRNP   40 mg at 08/27/24 0918    peg 400-hypromellose-glycerin (ARTIFICIAL TEARS) 1-0.2-0.2 % eye drops 1 drop  1 drop Both Eyes q4h while awake Simon Cano DO   1 drop at 08/27/24 2050    phenoL (SORE THROAT SPRAY) 1.4 % mucosal spray 1 spray  1 spray Mouth/Throat q2h PRN Simon Cano DO        senna (SENOKOT) tablet 2 tablet  2 tablet oral BID Maddy Cope MD   2 tablet at 08/27/24 2049    sildenafiL (pulm.hypertension) (REVATIO) tablet 20 mg  20 mg oral TID Simon Cano DO   20 mg at 08/27/24 2049    sodium chloride (OCEAN) 0.65 % nasal spray 2 spray  2 spray Each Nostril Daily Simon Cano DO   2 spray at 08/27/24 0918    white petrolatum (AQUAPHOR) ointment   Topical q4h PRN Simon Cano DO   Given at 08/13/24 2117    white petrolatum (AQUAPHOR) ointment   Topical BID Simon Cano DO   Given at 08/27/24 0923    white petrolatum-mineral oil (ARTIFICIAL TEARS OINT) ophthalmic ointment   Both Eyes Nightly Simon Cano DO   Given at 08/25/24 2130       Review of Systems:   All other systems reviewed and are negative except as per HPI.    Physical Exam:     Wt Readings from Last 10 Encounters:   08/28/24 54.9  kg (121 lb)   08/05/24 49.4 kg (109 lb)   08/05/24 49.4 kg (109 lb)   07/20/24 49.5 kg (109 lb 2 oz)   07/19/24 51.3 kg (113 lb)   06/20/24 51.7 kg (114 lb)   06/14/24 52.6 kg (116 lb)   05/23/24 52.4 kg (115 lb 9.6 oz)   04/17/24 53.5 kg (118 lb)   04/10/24 44.5 kg (98 lb)       BP Readings from Last 5 Encounters:   08/28/24 (!) 121/56   08/05/24 (!) 148/75   07/25/24 (!) 155/77   07/23/24 (!) 101/57   07/19/24 (!) 154/80       Vitals:    08/28/24 0730   BP: (!) 121/56   Pulse: 75   Resp: 18   Temp: 36.6 °C (97.9 °F)   SpO2: 98%       Constitutional: NAD, AAOx3  HENT: Normocephalic, atraumatic head, sclerae anicteric, no cervical lymphadenopathy, trachea midline, facial swelling  Cardiovascular: RRR, no murmurs, rubs or gallops, JVP: 13-14 cm H2O   cm H2O, no carotid bruits.  Respiratory: CTA bilateral lung fields, no wheezes, rales or rhonchi  GI: soft, non-tender/non-distended  Musculoskeletal / Extremities: +2 pulses bilateral radial, DP/PT arteries, skin tightening on face and fingertips, ulceration and discoloration of 2nd phalange b/l LE.   Skin: no rashes. Facial changes c/w scleroderma  Neuro / Psych: no focal deficits, CNII-XII grossly intact, appropriate, cooperative    Diagnostic Data:   Results from last 7 days   Lab Units 08/26/24  1211 08/25/24  1127 08/24/24  0951   WBC K/uL 9.87 8.43 7.84   HEMOGLOBIN g/dL 8.8* 6.8* 7.3*   HEMATOCRIT % 29.6* 23.3* 24.8*   MCV fL 86.5 83.8 83.5   PLATELETS K/uL 269 265 233     Results from last 7 days   Lab Units 08/26/24  1211 08/25/24  1127 08/24/24  0951   SODIUM mEQ/L 140 139 139   POTASSIUM mEQ/L 4.8 4.7 4.8   CHLORIDE mEQ/L 108* 108* 106   CO2 mEQ/L 24 27 28   BUN mg/dL 27* 34* 41*   CREATININE mg/dL 1.3* 1.5* 1.6*   CALCIUM mg/dL 9.0 8.7 9.0   GLUCOSE mg/dL 83 109* 125*   MAGNESIUM mg/dL 2.0 2.1 2.0       Component      Latest Ref Rng 7/20/2024 7/25/2024   Sodium      136 - 145 mEQ/L 142  139    Potassium, Bld      3.5 - 5.1 mEQ/L 4.2  4.3    Chloride      98  - 107 mEQ/L 107  103    CO2      21 - 31 mEQ/L 25  27    BUN      7 - 25 mg/dL 17  17    Creatinine      0.6 - 1.2 mg/dL 1.2  1.1    Glucose      70 - 99 mg/dL 85  86    Calcium      8.6 - 10.3 mg/dL 9.3  10.6 (H)    AST (SGOT)      13 - 39 IU/L  15    ALT (SGPT)      7 - 52 IU/L  8    Alkaline Phosphatase      34 - 125 IU/L  82    Total Protein      6.0 - 8.2 g/dL  8.8 (H)    Albumin      3.5 - 5.7 g/dL  4.3    Bilirubin, Total      0.3 - 1.2 mg/dL  0.4    eGFR      >=60.0 mL/min/1.73m*2 51.0 (L)  56.6 (L)    Anion Gap      3 - 15 mEQ/L 10  9       Component      Latest Ref [facility] 7/20/2024 7/25/2024   WBC      3.80 - 10.50 K/uL 7.27  10.29    Hemoglobin      11.8 - 15.7 g/dL 9.8 (L)  11.2 (L)    Hematocrit      35.0 - 45.0 % 32.0 (L)  37.3    MCV      83.0 - 98.0 fL 80.4 (L)  80.4 (L)    Platelets      150 - 369 K/uL 206  331       Component      Latest Ref Rn 7/19/2024 7/25/2024 8/9/2024   High Sens Troponin I      <15.0 pg/mL 89.2 (HH)  66.8 (HH)  92.5 (HH)    High Sens Troponin I       90.3 (HH)  83.4 (HH)         Component      Latest Ref Rn 5/22/2024 6/22/2024 7/19/2024 7/25/2024   BNP      <=100 pg/mL 352 (H)  150 (H)  151 (H)  220 (H)       Component      Latest Ref Rn 4/16/2024 5/23/2024 7/21/2024 7/22/2024   Sed Rate      0 - 30 mm/hr 101 (H)  79 (H)  105 (H)  >119 (H)       Component      Latest Ref Rn 4/18/2024 5/24/2024   WBC      3.80 - 10.50 K/uL 8.40  16.22 (H)    RBC      3.93 - 5.22 M/uL 3.54 (L)  3.82 (L)    Hemoglobin      11.8 - 15.7 g/dL 9.8 (L)  10.3 (L)    Hematocrit      35.0 - 45.0 % 32.8 (L)  33.9 (L)    Platelets      150 - 369 K/uL 254  289       Component      Latest Ref Rng 5/24/2024   Magnesium      1.8 - 2.5 mg/dL 2.1      Component      Latest Ref Rng 4/13/2023 3/7/2024 5/22/2024   TSH      0.34 - 5.60 mIU/L 4.71  0.28 (L)  1.03      Component      Latest Ref Rng 5/24/2024   Sodium      136 - 145 mEQ/L 138    Potassium, Bld      3.5 - 5.1 mEQ/L 4.4    Chloride      98 - 107 mEQ/L  104    CO2      21 - 31 mEQ/L 24    BUN      7 - 25 mg/dL 40 (H)    Creatinine      0.6 - 1.2 mg/dL 1.1    Glucose      70 - 99 mg/dL 87    Calcium      8.6 - 10.3 mg/dL 9.5    eGFR      >=60.0 mL/min/1.73m*2 56.6 (L)    Anion Gap      3 - 15 mEQ/L 10      Component      Latest Ref [facility] 4/13/2023   Hemoglobin A1C      <5.7 % 4.4        Component      Latest Ref [facility] 4/14/2023   Triglycerides      30 - 149 mg/dL 44    Cholesterol      <=200 mg/dL 194    HDL      >=55 mg/dL 49 (L)    LDL Calculated      <=100 mg/dL 136 (H)    Non-HDL, Calculated      mg/dL 145    RISK      <=5.0  4.0       Component      Latest Ref [facility] 4/13/2023   High Sens Troponin I      <15.0 pg/mL 23.0 (H)       Component      Latest Ref [facility] 4/14/2023   Ferritin      11 - 250 ng/mL 75      Component      Latest Ref [facility] 4/14/2023   Iron      35 - 150 ug/dL 29 (L)    TIBC      270 - 460 ug/dL 245 (L)    UIBC      180 - 360 ug/dL 216    Iron Saturation      15 - 45 % 12 (L)        Component      Latest Ref [facility] 6/27/2022 4/13/2023   BNP      <=100 pg/mL 111 (H)  105 (H)                          ---    ECG (8/2/2024, personally reviewed):   Sinus tachycardia   Left ventricular hypertrophy with repolarization abnormality    HR: 117 bpm     ---        Lower extremity arterial duplex and ENRRIQUE August 10, 2024:  Right ankle ENRRIQUE (DP): 0.74.  Right ankle ENRRIQUE (PT): 0.88.  Left ankle ENRRIQUE (DP): 0.90.  Left ankle ENRRIQUE (PT): 0.62.    Findings concerning for hemodynamically significant stenosis within the  bilateral popliteal arteries and distal left femoral artery. There are also  findings which can be seen with inflow stenosis at the level left common femoral  artery. CTA of the abdomen and pelvis with lower extremity runoff could be  performed for further evaluation if clinically indicated.    CT Chest / Abdomen / Pelvis w/ Contrast (7/25/2024, personally reviewed):     Lung bases: Left-sided pleural effusion, left parenchymal consolidation, and  findings of  interstitial lung disease.  More nodular density in the medial  aspect of the left lower lobe measuring 1.6 x 1.9 cm.  Cardiomegaly with a  pericardial effusion.     Liver: The liver is normal in size.  There is no focal mass.  Hepatic veins and  portal veins are patent without focal filling defects to suggest thrombosis..     Gallbladder:  Small dependent gallstones.  No gallbladder wall thickening..     Spleen: Spleen is normal in size without focal mass lesion..     Pancreas: The pancreas is normal without focal mass, parenchymal atrophy, or  pancreatic duct dilation..     Adrenals: The adrenals are normal bilaterally without focal mass..     Kidneys, ureters and bladder:  Symmetric enhancement and excretion..  Left lower  pole cystic lesion measures 3.2 cm in maximal dimension.  This measures greater  than simple fluid density.  This measured 60 Hounsfield units on the noncontrast  CT from 2020.  It measures 36 Hounsfield units on today's study.  Therefore it  is most in keeping with a hemorrhagic/proteinaceous cyst.  Additional  hypodensities too small to characterize.     Retroperitoneal structures: There is no abdominal aortic aneurysm.  Atherosclerotic calcification.  There is no retroperitoneal adenopathy or  retroperitoneal mass..     Bowel and mesentery: No evidence of a focal inflammatory or obstructive process.  Moderate fecal retention throughout the colon.  There are a few fluid-filled  small bowel loops in the pelvis, nonspecific.     Lymph nodes: No pathologic lymphadenopathy..     Pelvis: The uterus is normal in size.  The ovaries are normal.  There is no  adnexal mass or pelvic free fluid.     Bones: Within normal limits.    No evidence for pulmonary embolism.  Persistent left-sided pleural effusion and airspace disease at the left lung  base.  Underlying interstitial lung disease.     TTE (7/19/2024, personally reviewed):   Mild increased wall thickness with normal left ventricular cavity and  preserved systolic function. EF 65%. No wall motion abnormalities.   Normal right ventricular size with preserved systolic function.   IVC normal in size with >50% respiratory variation consistent with normal right sided filling pressures.   Small pericardial effusion. No evidence of hemodynamic compromise with normal respiratory variation and no chamber collapse. Prominent epicardial fat.   Compared with previous study of 5/23/2024, small pericardial effusion persists.      PET/CT Skull-to-Thigh (6/4/2024):   An approximately 1.6 cm pulmonary nodule at the medial left lung base is  significantly FDG avid at max SUV 8.5.  This is nonspecific and can be seen in  the setting of benign pathology however malignancy is a concern.     There is more mild to moderate nonspecific uptake localizing to lita-fissural  nodules particularly on the left.     There is  moderate nonspecific left hilar and mediastinal dawson uptake.     No additional suspicious FDG avid findings appreciated.     CTA Chest (5/21/2024):   IMPRESSION:  1.  No evidence of acute pulmonary embolism.  2.  Multiple new perifissural left lower lobe nodules, suspicious for a  neoplastic process. PET/CT and or tissue sampling is recommended.  3.  Chronic interstitial lung disease in an NSIP pattern, with overall slightly  progressed appearance since March 2023. Pulmonary consultation is recommended.  4.  Stable cardiomegaly and large pericardial effusion.  5.  Mediastinal and supraclavicular adenopathy, similar to prior study, also  indeterminate, which could be further evaluated at time of PET/CT.     CTA Chest (4/16/2024):   IMPRESSION:  1. No evidence of pulmonary embolism.  2. Stable cardiomegaly and large pericardial effusion. Cardiology consultation  suggested.  3. Emphysema with bilateral lower lobe groundglass opacity and bronchiectasis in  an NSIP pattern, which can be seen with scleroderma. Pulmonary consultation  suggested.  4. Stable 16 mm left lower  lobe masslike density which may represent  atelectasis, lung cancer or lymphoma. When clinically appropriate PET/CT  suggested.  5. Stable prominence of the main pulmonary artery which can be seen with  pulmonary hypertension     TTE (5/23/2024, personally reviewed):  Mild increased wall thickness with normal left ventricular cavity and preserved systolic function. EF 65%. No wall motion abnormalities.   Normal right ventricular size with preserved systolic function.   Tiny IVC with >50% respiratory variation consistent with low-normal right sided filling pressures.   Small pericardial effusion, predominantly adjacent to right atrium. No evidence of hemodynamic compromise with normal respiratory variation and no chamber collapse. Prominent epicardial fat.   Compared with previous study of 4/10/24, small pericardial effusion persists.      TTE (4/10/2024, personally reviewed):  1. Normal left ventricular cavity size with mild concentric left ventricular hypertrophy. Normal systolic function. EF: 60%. No regional wall motion abnormalities. Cannot determine diastolic function. Global longitudinal strain not reported due to technical limitations of study.   2. Aortic valve has three cusps. Trace aortic regurgitation. Aortic valve and root sclerosis.  3. Thickened mitral valve leaflets. Trace mitral valve regurgitation. Normal left atrial size.   4. Normal right ventricular structure and function. Trace tricuspid valve regurgitation with estimated RVSP: 51 mmHg (assuming right atrial pressure of 3 mmHg). Normal right atrial size. Pulmonic valve structurally normal. Trace pulmonic valve regurgitation. Pulsed wave Doppler of the RVOT systolic profile shows decreased acceleration times and geometry consistent with mildly elevated PVR.  5. IVC size is normal with > 50% inspiratory collapse consistent with normal right atrial pressure. Small-to-moderate sized, circumferential pericardial effusion. No echocardiographic  evidence for cardiac tamponade.   6. Compared to previous study from 3/4/2024, no significant change. Pericardial effusion size is similar, estimated right atrial pressure lower.           CTA Abd / Pelvis (3/13/2024, personally reviewed):   There is dilatation of the of proximal/mid small bowel with relatively abrupt  transition left hemiabdomen, likely obstruction. Questionable lesion at the  transition point. This could be better evaluated with CT or MRI enterography.     Aorta and major branches patent. Superior mesenteric artery patent without  significant stenosis.  Suspect moderate stenosis of the origins of the of renal arteries bilaterally.     Large pericardial effusion, no change.     Patchy opacities lower lungs, similar to prior.      Coronary CTA (3/11/2024):   1. Two-vessel coronary artery disease as above that is moderate in severity.  CT  FFR is normal..  2. There is mitral annular calcification and aortic sclerosis.  The right atrium  is enlarged.  There is left ventricular hypertrophy.  There is a moderate to  large circumferential pericardial effusion.  3. Normal left ventricular wall motion and systolic function.  4. Coronary calcium score 313, 96th percentile     TTE (3/4/2024, personally reviewed):   Normal-sized left ventricle. Mild left ventricular hypertrophy. Preserved systolic function. LVEF 60%. No regional wall motion abnormalities. Grade II diastolic dysfunction.  Normal global longitudinal strain (-20%).  The aortic valve is not well seen.  Cannot determine the number of cusps.  Sclerotic leaflets.  No aortic stenosis.  Trace aortic insufficiency.  Normal sized aortic root.  The visualized portion of the ascending aorta is normal in size.  The mitral valve leaflets are thickened. Mild mitral annular calcification. Trace mitral regurgitation.   Mildly dilated left atrium.  Normal-sized right ventricle. Normal right ventricular systolic function.  Thickened tricuspid valve. There is  mild tricuspid regurgitation with estimated RVSP of 46 mmHg.   Pulmonic valve is not well visualized. Trace pulmonic regurgitation.   Mildly dilated right atrium.  IVC is enlarged with <50% respiratory variation consistent with elevated right atrial filling pressure (at least 15 mmHg).   Moderate, circumferential, circumferential pericardial effusion.  The largest pocket is located inferolaterally.  Elevated right atrial pressure.  No other clear echocardiographic features of increased pericardial pressure.  Correlate clinically.   Compared to previous TTE from 6/21/2023, pericardial effusion now moderate in size.  Right atrial pressure now elevated.         CTA Chest (3/3/2024):   IMPRESSION:  1.  No evidence of acute pulmonary embolism to the level of the segmental  branches.  2.  Moderate to large pericardial effusion measuring higher than simple fluid  density.  3.  Lower lobe lung field predominant interstitial thickening with some relative  subpleural sparing, consistent with a chronic interstitial lung disease,  unchanged from the prior study.  4.  Continued bilateral axillary, mediastinal, and bilateral hilar  lymphadenopathy, not definitely changed from the prior study, possibly related  to the patient's sarcoid.  5.  Emphysema.      CTA Chest PE (10/4/2023):  IMPRESSION:  1. No evidence for filling defect or vessel cutoff to suggest pulmonary  embolism.  Enlargement of the pulmonary arteries compatible with pulmonary  arterial hypertension.  2. Changes throughout the lungs as described above which can be seen with  systemic sclerosis/scleroderma.  Underlying pneumonia the lung bases cannot be  entirely excluded in the proper clinical setting.  3.  Additional stable findings as described above.        VQ (9/12/2023):  IMPRESSION:  Intermediate to high probability of pulmonary embolic disease.     6MWT (7/25/2023, on Sildenafil 20 mg TID):        TTE (6/21/2023, personally reviewed):  1. Normal left  ventricular cavity size and mild concentric left ventricular hypertrophy. Normal systolic function. EF: 60%. No regional wall motion abnormalities. Grade I diastolic dysfunction.   2. Aortic valve cusps not well visualized. Trace aortic regurgitation. Aortic valve and root sclerosis.  3. Thickened mitral valve leaflets. Mild mitral annular calcification. Trace mitral regurgitation. Mildly dilated left atrial size.   4. Normal right ventricular structure and function. Mild tricuspid valve regurgitation with estimated RVSP: 55 mmHg (assuming right atrial pressure of 3 mmHg). Normal right atrial size. Pulmonic valve not well visualized. Trace pulmonic valve regurgitation. Pulsed wave Doppler of the RVOT systolic profile show decreased acceleration times ( msec) and late systolic notching consistent with elevated PVR.   5. IVC size is normal with > 50% inspiratory collapse consistent with normal right atrial pressure. Small pericardial effusion without echocardiographic evidence of tamponade.  6. Compared to previous study from 4/14/2023, estimated right ventricular systolic pressure is higher.        TTE (4/14/2023, personally reviewed):   The left ventricular cavity size is normal with mild left ventricular hypertrophy and preserved systolic function. Estimated EF 65%. No regional wall motion abnormalities. Grade I diastolic dysfunction.     The aortic valve is tricuspid. Aortic valve sclerosis. Trace aortic regurgitation.      The visualized portions of the aortic root and ascending aorta are normal in size.     The mitral valve is mildly thickened. Mild mitral annular calcification. Trace mitral regurgitation.       The left atrium is severely dilated.      The right ventricle is normal in size with preserved systolic function     The pulmonic valve is not well seen.     The tricuspid valve is normal in appearance. mild tricuspid regurgitation with an estimated RVSP of 34 mmHg.       Right atrium is normal in  size.     The IVC is small and collapses > 50% with inspiration consistent with normal right atrial pressures.     Small pericardial effusion, mostly posterior.       Compared to previous echocardiogram from 8/22/2022, there is no significant change        TTE (11/28/2022, personally reviewed):   Normal right- and mildly elevated left-sided filling pressures (RA: 3 mmHg, PCWP: 13 mmHg)  Elevated pulmonary artery pressures (60/22(35 mmHg)) in the setting of PCWP: 13 mmHg.   Normal cardiac output and index (CO: 5.1 L/min, CI: 3.2 L/min/m2)  PVR: 4.3 Wood units. SVR: 1840 dynes/sec/cm5      TTE (8/22/2022, personally reviewed):  Normal-sized LV. Normal LV systolic function. Estimated EF 55- 60%. Wall motion appears grossly normal. Mild concentric left ventricular hypertrophy. Grade I LV diastolic dysfunction.  Pulmonic valve not well visualized. Grossly normal pulmonic valve structure. Trace pulmonic valve regurgitation. No pulmonic valve stenosis.  Aortic valve not well visualized. Aortic valve not well seen but likely trileaflet. Focal aortic valve calcification present. Sclerotic aortic valve leaflets. Mild aortic valve regurgitation. No aortic valve stenosis.  RV not well visualized. Normal-sized RV. Normal RV systolic function.  Normal-sized RA.  There is a small loculated pericardial effusion anterior to the right atrium. No cardiac tamponade.  Sclerotic mitral valve. Mitral valve calcification of the anterior leaflet present.  Trace mitral valve regurgitation.  No significant mitral valve stenosis.  Normal-sized IVC. IVC demonstrates normal respiratory collapse.  Tricuspid valve structure is grossly normal. Mild to moderate tricuspid valve regurgitation.  Estimated RVSP = 50-55 mmHg.  Mildly dilated LA.  No previous echocardiogram available for comparison.     TTE (4/21/2022, outside study):  This result has an attachment that is not available.     A complete transthoracic echocardiogram (including 2D, color  flow   Doppler, spectral Doppler and M-mode imaging) was performed using the   standard protocol. The study quality was adequate.     The left ventricle is normal in size. There is moderately increased   wall thickness consistent with moderate concentric hypertrophy.   Endocardium is incompletely visualized, but no regional wall motion   abnormalities are noted in the visualized segments. Normal left   ventricular ejection fraction. The left ventricular ejection fraction by   visual estimate is 55% to 60%.     The right ventricle is normal in size. There is normal function of the   right ventricle.     The left atrium is normal in size. Left atrial volume is 58 mL.     The right atrial volume measures 52 mL. The right atrial volume index   measures 34 mL/m2.     There is trace mitral regurgitation. There is no mitral stenosis.     The aortic valve is trileaflet. There is mild aortic valve thickening.   There is no aortic stenosis. There is trace aortic regurgitation.     There is mild to moderate tricuspid regurgitation. Pulmonary artery   systolic pressure measures 52 mmHg. The pulmonary artery systolic pressure   is moderately elevated.     The aorta measures 3.2 cm at the sinus of Valsalva. The proximal   segment of the ascending aorta is mildly enlarged. The proximal segment of   the ascending aorta measures 3.4 cm. The proximal segment of the ascending   aorta measures 2.2 cm/m2, indexed for body surface area.     Trivial pericardial effusion present. There is no echocardiographic   evidence of cardiac tamponade.     The inferior vena cava is normal in size (diameter <21 mm) and   decreases >50% in size with inspiration, suggesting a normal right atrial   pressure of 3 mmHg (range 0-5 mm Hg).     Compared to the prior study of 3/13/2020, there are no significant   changes.        PFT (3/23/2022):      PFT (7/14/2021):      CT Chest (5/2/2021, outside study):  CLINICAL INFORMATION: Systemic sclerosis.  Interstitial lung disease. Groundglass nodule     PROCEDURE: Unenhanced CT of the chest was performed using thin section reconstructions. Images were obtained on inspiration and expiration.     COMPARISON: June and August 2020     FINDINGS:     LUNGS, PLEURA:     Groundglass opacities with reticulation with subpleural sparing in the lower lobes, without honeycombing or architectural distortion, unchanged.     Right upper lobe groundglass nodule, image 114 of series 3, 8 x 11 mm, previously 6 mm.     Expiratory images are of diagnostic quality and do not demonstrate evidence of clinically significant air trapping.     CARDIOVASCULAR, MEDIASTINUM, THYROID: Coronary calcifications. Trace pericardial effusion.     LYMPH NODES: Subcentimeter mediastinal lymph nodes, unchanged. Unchanged axillary lymph nodes.     SKELETON, CHEST WALL: No destructive bone lesion     UPPER ABDOMEN: Patulous esophagus. 3 mm right renal stone.       RHC (9/02/2020):  RA   8 mmHg   RV   40/5 mmHg   PA (mean)  44/16 (25) mmHg   PCWP   12 mmHg   CO   4.65 lpm (Thermodilution)   CI   2.65 lpm/m-squared   SVI    33 ml/beat/m-squared (lower limit normal 29)   PVR   2.8 mmHg/l/min (Wood units)   SVR   2064 dyne-sec-cm-5         PFT (3/18/2020):      TTE (3/13/2020, outside study):  · The study quality was good.   · The left ventricle is normal in size. Top normal left ventricular wall   thickness. There are no segmental wall motion abnormalities. The left   ventricular ejection fraction is 55-60% by visual estimate and 3D   echocardiography. Normal left ventricular ejection fraction.   · There is grade I (mild) LV diastolic dysfunction.   · The right ventricle is normal in size. There is normal function of the   right ventricle.   · The right atrium is mildly dilated.   · There is mild mitral valve anterior leaflet thickening. There is mild   mitral valve leaflet calcification. There is flattening of the mitral   leaflets without raphael prolapse.  There is trace mitral regurgitation.   · The aortic valve is trileaflet. There is mild thickening of the aortic   valve. There is mild calcification of the aortic valve. There is no aortic   /stenosis. There is trace aortic regurgitation.   · There is mild tricuspid valve leaflet thickening. There is mild to   moderate tricuspid regurgitation. The pulmonary artery systolic pressure   is moderately elevated. Pulmonary artery systolic pressure measures 50   mmHg. There is mid-systolic notching of the RVOT VTI consistent with   elevated pulmonary vascular resistance.   · The inferior vena cava is normal in size (diameter <21 mm) and decreases   >50% in size with inspiration, suggesting a normal right atrial pressure   of 3 mmHg (range 0-5 mm Hg).   · Trivial loculated pericardial effusion present adjacent to the right   ventricle and adjacent to the right atrium.          Assessment / Plan:     1. Chest Pain:  - Given pattern and character, I believe this is musculoskeletal / serous pain from her autoimmune disease. There may be an element of myopericarditis.  - She reports that pain has not responded to increased in steroids in the past   - Non-ACS chest pain and known non-obstructive CAD as recently as March 2024 CCTA.     2. Pulmonary Hypertension (WHO Group I, NYHA/WHO FC III):   - Occurring in the background of Systemic Scleroderma with ILD. August 2022 TTE showed normal RV size and function, but progressive exertional decline, worsened DLCO and resting tachycardia suggest there may be progression of her pulmonary vascular disease and limitation of cardiac output. This was proven with 11/2022 RHC showing mean PA 35 mmHg (with PCWP 13 mmHg) and PVR 4.3 Wood units. Recent TTEs have shown appropriate RV:PA coupling with normal RV size and systolic function.   - She is hypervolemic on exam  -Discordant weights, no recorded I&Os  -S/P PO ethacrynic acid 50 mg x2 yesterday, she reported that her urine output was normal  or even a bit less.   -Standing weight is higher today than yesterday from 120 to 121lbs.   -please diuresis with IV ethacrynic acid 50 m. Please continue daily standing weights.  I's and O's.    - Continue Sildenafil 20 mg TID   - She should continue uninterrupted Macitentan 10 mg daily -her home medication has been brought in and she is receiving it.    3. HTN:   - Continue Amlodipine.     4. Systemic Scleroderma / Raynaud's Disease:   - Per Rheumatology (Dr. Trevizo).      5. ILD:   - Per Pulmonology and Rheumatology (Dr. Trevizo). She has not tolerated trials of Mycophenolate. She briefly received Tocilizumab.     6. PE:   - She has had elevated probability on V/Q scans, but CTA Chest has consistently not shown evidence for acute or chronic thromboembolism. Apixaban has been discontinued.     7. CAD:   - Two-vessel non-obstructive disease on March 2024 CCTA. She is continued on Atorvastatin 40 mg daily.     8. PAD with Toe Ulcerations  -S/P left PTA dSFA/TP and DCB to popliteal arteries 8/14/24. Followed by vascular surgery.   -Hematoma noted with drop in hgb - clopidogrel was held.  -She received 1 unit of blood with improvement in hgb.   -continue Clopidogrel and Atorvastatin.     9. Lung Mass  - Pathology from 8/8 biopsy with atypical glandular cells, possibly consistent with adenocarcinoma  - Management per pulmonology, outpatient follow up.     RAS Marcos  8/28/2024

## 2024-08-28 NOTE — PLAN OF CARE
Problem: Adult Inpatient Plan of Care  Goal: Plan of Care Review  Outcome: Progressing  Flowsheets (Taken 8/28/2024 0621)  Progress: no change  Outcome Evaluation: Pt continues to have pain 10/10, RLQ, chest, L foot, given dilaudid po x1, tylenol atc, pt states no relief. IV zofran for intermittent nausea, no  vomitting. OOB to bathroom with asst x1, no c/o dizziness. Bed alarm on, call bell in reach. Wound care completed.Will continue to monitor.  Plan of Care Reviewed With: patient

## 2024-08-28 NOTE — PLAN OF CARE
Problem: Adult Inpatient Plan of Care  Goal: Plan of Care Review  Outcome: Progressing  Flowsheets (Taken 8/28/2024 1819)  Progress: no change  Outcome Evaluation: d/c pending diuresis. 1A oob. bed alarm in place. call bell within reach.  Plan of Care Reviewed With: patient  Goal: Patient-Specific Goal (Individualized)  Outcome: Progressing  Goal: Absence of Hospital-Acquired Illness or Injury  Outcome: Progressing  Goal: Optimal Comfort and Wellbeing  Outcome: Progressing  Goal: Readiness for Transition of Care  Outcome: Progressing     Problem: Fall Injury Risk  Goal: Absence of Fall and Fall-Related Injury  Outcome: Progressing     Problem: Skin Injury Risk Increased  Goal: Skin Health and Integrity  Outcome: Progressing     Problem: Mobility Impairment  Goal: Optimal Mobility  Outcome: Progressing     Problem: Self-Care Deficit  Goal: Improved Ability to Complete Activities of Daily Living  Outcome: Progressing   Plan of Care Review  Plan of Care Reviewed With: patient  Progress: no change  Outcome Evaluation: d/c pending diuresis. 1A oob. bed alarm in place. call bell within reach.

## 2024-08-28 NOTE — PROGRESS NOTES
Hospital Medicine Service -  Daily Progress Note       SUBJECTIVE   Interval History: Patient seen and examined at bedside, she continues to have abdominal pain today and lower extremity pain.      She is currently being diuresed for heart failure exacerbation, however has had poor recordings of her inputs and outputs    Discussed with pharmacy and will start IV ethacrynic acid today   OBJECTIVE      Vital signs in last 24 hours:  Temp:  [36.5 °C (97.7 °F)-37.1 °C (98.8 °F)] 36.5 °C (97.7 °F)  Heart Rate:  [] 95  Resp:  [18] 18  BP: (117-145)/(56-63) 129/59    Intake/Output Summary (Last 24 hours) at 8/28/2024 1434  Last data filed at 8/28/2024 1345  Gross per 24 hour   Intake 345 ml   Output 200 ml   Net 145 ml       PHYSICAL EXAMINATION        Physical Exam  General:chronically ill looking, on 2l n/c  HEENT: Symmetric, PERRL/EOMI, moist membranes, NCAT  Cardiovascular: Regular rate and rhythm, normal S1/S2, no murmurs, rubs, gallops, +JVD  Pulmonary: Clear to auscultation bilaterally, no wheezing, rhonchi  GI: Soft, nontender, nondistended, bowel sounds normal, no organomegaly  Musculoskeletal: Normal bulk and tone, normal ROM  SKIN: scarring, hyper/hypopigmentation on face, scalp, upper and lower extremities, has missing digits in in bilateral hand fingers, and gangrene toes bilateral LE  Extremities: Distal pulses intact, No LE edema bilaterally  Neuro: CN2-12 intact, sensation intact, with no motor deficits  Psych: Sad mood and affect        LINES, CATHETERS, DRAINS, AIRWAYS, AND WOUNDS   Lines, Drains, and Airways:   Peripheral IV (Adult) 08/21/24 Anterior;Upper;Left Arm (Active)   Number of days: 4       Wound Anterior;Right Toe (2nd) (Active)   Number of days: 17       Wound Anterior;Left Toe (Great) (Active)   Number of days: 17       Wound Anterior;Left Toe (2nd) (Active)   Number of days: 17       Wound Arterial Ulcer Anterior;Right Ankle (Active)   Number of days: 5       Wound Arterial Ulcer  Anterior;Left Ankle (Active)   Number of days: 5       Wound Anterior;Left Toe (5th) (Active)   Number of days: 5       Surgical Incision Groin Right (Active)   Number of days: 11       Wounds (agree with documentation and present on admission)     LABS / IMAGING / TELE      Labs  Lab Results   Component Value Date    GLUCOSE 83 08/26/2024    CALCIUM 9.0 08/26/2024     08/26/2024    K 4.8 08/26/2024    CO2 24 08/26/2024     (H) 08/26/2024    BUN 27 (H) 08/26/2024    CREATININE 1.3 (H) 08/26/2024       Lab Results   Component Value Date    WBC 9.87 08/26/2024    HGB 8.8 (L) 08/26/2024    HCT 29.6 (L) 08/26/2024    MCV 86.5 08/26/2024     08/26/2024       Imaging  No results found.     Telemetry  Reviewed independently. No events.    ASSESSMENT AND PLAN        # Chronic pain -- Patient reports multiple areas of chronic pain with associated point tenderness on exam. She feels this pain is acutely worsened during this hospitalization. Imaging negative. Likely MSK in etiology. Pain has been very difficult to control due to either lack of improvement, intolerable side effects, or allergies. She has tried several regimens including IV fentanyl without improvement. PC and Pain Management following.  -Has no chest pain today  -Complains severely of bilateral lower extremity pain states left leg pain is worse than right  -Will continue gabapentin and Dilaudid  - Scheduled outpatient appointment with Pain Management, Dr. Charles on 9/23/2024 at 1:30pm     # Nausea/Vomiting, Ileus, Constipation -- Patient with persistent and worsening nausea/vomiting this week along with progressive constipation. Likely in the setting of heavy opiate use for pain control as above. KUB shows significant stool retention throughout the colon. Non-obstructive bowel gas pattern.  - Scheduled IV zofran  -Added Reglan   - Methylnaltrexone every other day, PC approved  - S/p 1/2 dose bowel prep on 8/23 and again on 8/24 with good  response  - Bisacodyl suppository TID, Mineral oil enema BID  - Overall, appears to be slowly improving     # GEORGE -- Cr up to 1.6 from baseline 1.1-1.2. Possibly prerenal iso emesis.  - Resuming ethacrynic acid for now  -Initially received ethacrynic acid 50 mg twice daily yesterday orally but still remains volume up  - Avoid nephrotoxins, Renally dose meds     # Dry gangrene of bilateral toes, PAD -- S/p LLE balloon angiogram with Vascular Surgery on 8/14.  - Local wound care  - Continue clopidogrel daily, She is allergic to aspirin  - Continue statin     # R groin hematoma, Acute blood loss anemia -- Suffered as a complication of LLE angiogram for dry gangrene/PAD as above. No active extravasation on CTA. C/b drop in Hb to ~7. FA/B12 wnl. Ferritin 24, %sat 9.  - Clopidogrel resumed on 8/17  - Hb downtrended to 6.8, given 1unit  - Today 8.8  - If concerns for hemodynamic instability/active bleeding, would recommend repeat abdominal imaging to assess for internal bleed/hematoma enlargement  - IV iron x5 doses     # ILD, Pulm HTN -- Chronic on 2L NC.   - Continue home sildenafil, macitentan  -Evaluated by cardiology today  -Resume ethacrynic acid  -Ethacrynic acid oral 50 mg twice daily and then IV ethacrynic acid 50 mg daily  -BNP elevated to 315     # Pulmonary nodule -- S/p EBUS with path showing small focus of atypical glands. - Outpatient Pulmonology follow-up.     # Bilateral eye pain -- Seen by Optho, drops recommended.       VTE Assessment: Padua    VTE Prophylaxis:  Current anticoagulants:    None      Code Status: Full Code      Estimated Discharge Date: 8/30/2024   Disposition Planning: D/c home pending diuresis      Ba Figueroa MD  8/28/2024

## 2024-08-28 NOTE — NURSING NOTE
08/28/24 0920 08/28/24 0925 08/28/24 0932   Vital Signs   Heart Rate (!) 103 (!) 106 (!) 120   Heart Rate Source Monitor  --  Monitor   /63 134/60 (!) 145/63   MAP (mmHg) 90 mmHg 88 mmHg 91 mmHg   BP Location Right upper arm Right upper arm Right upper arm   BP Method Automatic Automatic Automatic   Patient Position Lying Sitting Standing     Orthostatic vitals done prior to pt going to bathroom. No c/o dizziness. Will cont plan of care

## 2024-08-28 NOTE — PROGRESS NOTES
Hospital Medicine Service -  Daily Progress Note       SUBJECTIVE   Interval History: Patient seen and examined at bedside, she continues to complain of bilateral lower extremity pain.  She requested for her bronchoscopy results   OBJECTIVE      Vital signs in last 24 hours:  Temp:  [36.5 °C (97.7 °F)-37.1 °C (98.8 °F)] 36.5 °C (97.7 °F)  Heart Rate:  [] 95  Resp:  [18] 18  BP: (117-145)/(56-63) 129/59    Intake/Output Summary (Last 24 hours) at 8/28/2024 1430  Last data filed at 8/28/2024 1345  Gross per 24 hour   Intake 345 ml   Output 200 ml   Net 145 ml       PHYSICAL EXAMINATION        Physical Exam  General:chronically ill looking, on 2l n/c  HEENT: Symmetric, PERRL/EOMI, moist membranes, NCAT  Cardiovascular: Regular rate and rhythm, normal S1/S2, no murmurs, rubs, gallops, no JVD  Pulmonary: Clear to auscultation bilaterally, no wheezing, rhonchi  GI: Soft, nontender, nondistended, bowel sounds normal, no organomegaly  Musculoskeletal: Normal bulk and tone, normal ROM  SKIN: scarring, hyper/hypopigmentation on face, scalp, upper and lower extremities, has missing digits in in bilateral hand fingers, and gangrene toes bilateral LE  Extremities: Distal pulses intact, No LE edema bilaterally  Neuro: CN2-12 intact, sensation intact, with no motor deficits  Psych: Sad mood and affect        LINES, CATHETERS, DRAINS, AIRWAYS, AND WOUNDS   Lines, Drains, and Airways:   Peripheral IV (Adult) 08/21/24 Anterior;Upper;Left Arm (Active)   Number of days: 4       Wound Anterior;Right Toe (2nd) (Active)   Number of days: 17       Wound Anterior;Left Toe (Great) (Active)   Number of days: 17       Wound Anterior;Left Toe (2nd) (Active)   Number of days: 17       Wound Arterial Ulcer Anterior;Right Ankle (Active)   Number of days: 5       Wound Arterial Ulcer Anterior;Left Ankle (Active)   Number of days: 5       Wound Anterior;Left Toe (5th) (Active)   Number of days: 5       Surgical Incision Groin Right  (Active)   Number of days: 11       Wounds (agree with documentation and present on admission)     LABS / IMAGING / TELE      Labs  Lab Results   Component Value Date    GLUCOSE 83 08/26/2024    CALCIUM 9.0 08/26/2024     08/26/2024    K 4.8 08/26/2024    CO2 24 08/26/2024     (H) 08/26/2024    BUN 27 (H) 08/26/2024    CREATININE 1.3 (H) 08/26/2024       Lab Results   Component Value Date    WBC 9.87 08/26/2024    HGB 8.8 (L) 08/26/2024    HCT 29.6 (L) 08/26/2024    MCV 86.5 08/26/2024     08/26/2024       Imaging  No results found.     Telemetry  Reviewed independently. No events.    ASSESSMENT AND PLAN        # Chronic pain -- Patient reports multiple areas of chronic pain with associated point tenderness on exam. She feels this pain is acutely worsened during this hospitalization. Imaging negative. Likely MSK in etiology. Pain has been very difficult to control due to either lack of improvement, intolerable side effects, or allergies. She has tried several regimens including IV fentanyl without improvement. PC and Pain Management following.  -Has no chest pain today  -Complains severely of bilateral lower extremity pain states left leg pain is worse than right  -Will continue gabapentin and Dilaudid  - Scheduled outpatient appointment with Pain Management, Dr. Charles on 9/23/2024 at 1:30pm     # Nausea/Vomiting, Ileus, Constipation -- Patient with persistent and worsening nausea/vomiting this week along with progressive constipation. Likely in the setting of heavy opiate use for pain control as above. KUB shows significant stool retention throughout the colon. Non-obstructive bowel gas pattern.  - Scheduled IV zofran, Consider addition of reglan or compazine in combination  - Methylnaltrexone every other day, PC approved  - S/p 1/2 dose bowel prep on 8/23 and again on 8/24 with good response  - Bisacodyl suppository TID, Mineral oil enema BID  - Consult GI if additional assistance is  needed  - Overall, appears to be slowly improving     # GEORGE -- Cr up to 1.6 from baseline 1.1-1.2. Possibly prerenal iso emesis.  - Resuming ethacrynic acid for now  - Avoid nephrotoxins, Renally dose meds     # Dry gangrene of bilateral toes, PAD -- S/p LLE balloon angiogram with Vascular Surgery on 8/14.  - Local wound care  - Continue clopidogrel daily, She is allergic to aspirin  - Continue statin     # R groin hematoma, Acute blood loss anemia -- Suffered as a complication of LLE angiogram for dry gangrene/PAD as above. No active extravasation on CTA. C/b drop in Hb to ~7. FA/B12 wnl. Ferritin 24, %sat 9.  - Clopidogrel resumed on 8/17  - Hb downtrended to 6.8, given 1unit  - Today 8.8  - If concerns for hemodynamic instability/active bleeding, would recommend repeat abdominal imaging to assess for internal bleed/hematoma enlargement  - IV iron x5 doses     # ILD, Pulm HTN -- Chronic on 2L NC.   - Continue home sildenafil, macitentan  -Evaluated by cardiology today  -Resume ethacrynic acid  -BNP elevated to 315     # Pulmonary nodule -- S/p EBUS with path showing small focus of atypical glands. - Outpatient Pulmonology follow-up.     # Bilateral eye pain -- Seen by Optho, drops recommended.       VTE Assessment: Padua    VTE Prophylaxis:  Current anticoagulants:    None      Code Status: Full Code      Estimated Discharge Date: 8/30/2024   Disposition Planning: D/c home pending diuresis      Ba Figueroa MD  8/28/2024

## 2024-08-28 NOTE — PLAN OF CARE
Care Coordination Discharge Plan Note     Discharge Needs Assessment  Concerns to be Addressed: discharge planning  Current Discharge Risk: chronically ill, physical impairment    Anticipated Discharge Plan  Anticipated Discharge Disposition: home with assistance, home with home health  Type of Home Care Services: nursing, home PT, home OT, home health aide    Patient Choice  Offered/Gave Vendor List: yes  Patient's Choice of Community Agency(s): [facility]    Patient and/or patient guardian/advocate was made aware of their right to choose a provider. A list of eligible providers was presented and reviewed with the patient and/or patient guardian/advocate in written and/or verbal form. The list delineates providers in the patient’s desired geographic area who are participating in the Medicare program and/or providers contracted with the patient’s primary insurance. The Medicare list and quality ratings were obtained from the Medicare.gov [medicare.gov] website.    ---------------------------------------------------------------------------------------------------------------------    Interdisciplinary Discharge Plan Review:  Participants:, physical therapy, physician, family/lay caregiver, nursing, social work/services, occupational therapy    Concerns Comments: Tentative D/C home with [facility]-home palliative care. Contacted [facility] to add home palliative care to the home care referral. Discussed with pt's daughter's, R Maikel, & ACACIA Khan, D/C plans and offered them both a choice of NP palliative care programs. They close [facility]. Faxed clinical information to [facility] @ (f) [phone]. Pt has HHAs from [facility] @ [phone] 7 days/week. Demographics are correct. Will provide an O2 tank from [facility] to pt prior to D/C.    Discharge Plan:   Disposition/Destination: Home Health  Care - Other / Home    Discharge Transportation:  Is Out of Hospital DNR needed at Discharge: no  Does patient need discharge transport? No

## 2024-08-28 NOTE — PLAN OF CARE
Contacted [facility] to add home palliative care; wait to see if home NP palliative care program is offered.

## 2024-08-29 ENCOUNTER — TELEPHONE (OUTPATIENT)
Dept: VASCULAR SURGERY | Facility: CLINIC | Age: 64
End: 2024-08-29
Payer: COMMERCIAL

## 2024-08-29 LAB
ANION GAP SERPL CALC-SCNC: 9 MEQ/L (ref 3–15)
ATRIAL RATE: 81
BUN SERPL-MCNC: 28 MG/DL (ref 7–25)
CALCIUM SERPL-MCNC: 9 MG/DL (ref 8.6–10.3)
CHLORIDE SERPL-SCNC: 109 MEQ/L (ref 98–107)
CO2 SERPL-SCNC: 24 MEQ/L (ref 21–31)
CREAT SERPL-MCNC: 1.3 MG/DL (ref 0.6–1.2)
EGFRCR SERPLBLD CKD-EPI 2021: 46.3 ML/MIN/1.73M*2
ERYTHROCYTE [DISTWIDTH] IN BLOOD BY AUTOMATED COUNT: 23.5 % (ref 11.7–14.4)
GLUCOSE SERPL-MCNC: 125 MG/DL (ref 70–99)
HCT VFR BLD AUTO: 30.2 % (ref 35–45)
HGB BLD-MCNC: 9.1 G/DL (ref 11.8–15.7)
MAGNESIUM SERPL-MCNC: 1.6 MG/DL (ref 1.8–2.5)
MCH RBC QN AUTO: 26.1 PG (ref 28–33.2)
MCHC RBC AUTO-ENTMCNC: 30.1 G/DL (ref 32.2–35.5)
MCV RBC AUTO: 86.8 FL (ref 83–98)
P AXIS: 50
PDW BLD AUTO: 10.7 FL (ref 9.4–12.3)
PLATELET # BLD AUTO: 284 K/UL (ref 150–369)
POTASSIUM SERPL-SCNC: 4.2 MEQ/L (ref 3.5–5.1)
PR INTERVAL: 148
QRS DURATION: 72
QT INTERVAL: 388
QTC CALCULATION(BAZETT): 450
R AXIS: 18
RBC # BLD AUTO: 3.48 M/UL (ref 3.93–5.22)
SODIUM SERPL-SCNC: 142 MEQ/L (ref 136–145)
T WAVE AXIS: 95
VENTRICULAR RATE: 81
WBC # BLD AUTO: 8.54 K/UL (ref 3.8–10.5)

## 2024-08-29 PROCEDURE — 63700000 HC SELF-ADMINISTRABLE DRUG: Performed by: INTERNAL MEDICINE

## 2024-08-29 PROCEDURE — 82310 ASSAY OF CALCIUM: CPT | Performed by: INTERNAL MEDICINE

## 2024-08-29 PROCEDURE — 99233 SBSQ HOSP IP/OBS HIGH 50: CPT | Performed by: NURSE PRACTITIONER

## 2024-08-29 PROCEDURE — 63700000 HC SELF-ADMINISTRABLE DRUG: Performed by: NURSE PRACTITIONER

## 2024-08-29 PROCEDURE — 63600000 HC DRUGS/DETAIL CODE: Mod: JZ | Performed by: NURSE PRACTITIONER

## 2024-08-29 PROCEDURE — 25000000 HC PHARMACY GENERAL: Performed by: INTERNAL MEDICINE

## 2024-08-29 PROCEDURE — 83735 ASSAY OF MAGNESIUM: CPT | Performed by: INTERNAL MEDICINE

## 2024-08-29 PROCEDURE — 99232 SBSQ HOSP IP/OBS MODERATE 35: CPT | Performed by: INTERNAL MEDICINE

## 2024-08-29 PROCEDURE — 85027 COMPLETE CBC AUTOMATED: CPT | Performed by: INTERNAL MEDICINE

## 2024-08-29 PROCEDURE — 21400000 HC ROOM AND CARE CCU/INTERMEDIATE

## 2024-08-29 PROCEDURE — 63700000 HC SELF-ADMINISTRABLE DRUG: Performed by: STUDENT IN AN ORGANIZED HEALTH CARE EDUCATION/TRAINING PROGRAM

## 2024-08-29 PROCEDURE — 93010 ELECTROCARDIOGRAM REPORT: CPT | Performed by: INTERNAL MEDICINE

## 2024-08-29 PROCEDURE — 63600000 HC DRUGS/DETAIL CODE: Mod: JZ | Performed by: STUDENT IN AN ORGANIZED HEALTH CARE EDUCATION/TRAINING PROGRAM

## 2024-08-29 PROCEDURE — 36415 COLL VENOUS BLD VENIPUNCTURE: CPT | Performed by: INTERNAL MEDICINE

## 2024-08-29 PROCEDURE — 25800000 HC PHARMACY IV SOLUTIONS: Performed by: STUDENT IN AN ORGANIZED HEALTH CARE EDUCATION/TRAINING PROGRAM

## 2024-08-29 PROCEDURE — 99233 SBSQ HOSP IP/OBS HIGH 50: CPT | Performed by: INTERNAL MEDICINE

## 2024-08-29 PROCEDURE — 25800000 HC PHARMACY IV SOLUTIONS: Performed by: INTERNAL MEDICINE

## 2024-08-29 PROCEDURE — 93005 ELECTROCARDIOGRAM TRACING: CPT | Performed by: NURSE PRACTITIONER

## 2024-08-29 RX ADMIN — LIDOCAINE 4% 1 PATCH: 40 PATCH TOPICAL at 14:00

## 2024-08-29 RX ADMIN — CLOPIDOGREL 75 MG: 75 TABLET ORAL at 08:37

## 2024-08-29 RX ADMIN — GUAIFENESIN 600 MG: 600 TABLET ORAL at 08:36

## 2024-08-29 RX ADMIN — SILDENAFIL 20 MG: 20 TABLET, FILM COATED ORAL at 13:22

## 2024-08-29 RX ADMIN — LEVOTHYROXINE SODIUM 100 MCG: 0.1 TABLET ORAL at 06:32

## 2024-08-29 RX ADMIN — PANTOPRAZOLE SODIUM 40 MG: 40 TABLET, DELAYED RELEASE ORAL at 08:37

## 2024-08-29 RX ADMIN — WHITE PETROLATUM: 1.75 OINTMENT TOPICAL at 20:52

## 2024-08-29 RX ADMIN — GLYCERIN 1 DROP: .002; .002; .01 SOLUTION/ DROPS OPHTHALMIC at 08:38

## 2024-08-29 RX ADMIN — GABAPENTIN 200 MG: 100 CAPSULE ORAL at 20:49

## 2024-08-29 RX ADMIN — GLYCERIN 1 DROP: .002; .002; .01 SOLUTION/ DROPS OPHTHALMIC at 06:00

## 2024-08-29 RX ADMIN — GLYCERIN 1 DROP: .002; .002; .01 SOLUTION/ DROPS OPHTHALMIC at 20:53

## 2024-08-29 RX ADMIN — SODIUM CHLORIDE 125 MG: 9 INJECTION, SOLUTION INTRAVENOUS at 08:44

## 2024-08-29 RX ADMIN — ACETAMINOPHEN 975 MG: 325 TABLET ORAL at 18:11

## 2024-08-29 RX ADMIN — GLYCERIN 1 DROP: .002; .002; .01 SOLUTION/ DROPS OPHTHALMIC at 17:54

## 2024-08-29 RX ADMIN — GABAPENTIN 200 MG: 100 CAPSULE ORAL at 08:36

## 2024-08-29 RX ADMIN — CYANOCOBALAMIN TAB 1000 MCG 1000 MCG: 1000 TAB at 08:37

## 2024-08-29 RX ADMIN — MINERAL OIL, WHITE PETROLATUM: .03; .94 OINTMENT OPHTHALMIC at 20:59

## 2024-08-29 RX ADMIN — SILDENAFIL 20 MG: 20 TABLET, FILM COATED ORAL at 20:48

## 2024-08-29 RX ADMIN — ONDANSETRON 4 MG: 2 INJECTION INTRAMUSCULAR; INTRAVENOUS at 15:22

## 2024-08-29 RX ADMIN — FAMOTIDINE 20 MG: 20 TABLET, FILM COATED ORAL at 20:49

## 2024-08-29 RX ADMIN — WHITE PETROLATUM: 1.75 OINTMENT TOPICAL at 08:37

## 2024-08-29 RX ADMIN — SILDENAFIL 20 MG: 20 TABLET, FILM COATED ORAL at 08:36

## 2024-08-29 RX ADMIN — ACETAMINOPHEN 975 MG: 325 TABLET ORAL at 06:00

## 2024-08-29 RX ADMIN — SENNOSIDES 2 TABLET: 8.6 TABLET, FILM COATED ORAL at 20:49

## 2024-08-29 RX ADMIN — ONDANSETRON 4 MG: 2 INJECTION INTRAMUSCULAR; INTRAVENOUS at 06:32

## 2024-08-29 RX ADMIN — GLYCERIN 1 DROP: .002; .002; .01 SOLUTION/ DROPS OPHTHALMIC at 13:22

## 2024-08-29 RX ADMIN — AMLODIPINE BESYLATE 10 MG: 10 TABLET ORAL at 08:36

## 2024-08-29 RX ADMIN — ETHACRYNATE SODIUM 50 MG: 50 INJECTION, POWDER, LYOPHILIZED, FOR SOLUTION INTRAVENOUS at 10:43

## 2024-08-29 RX ADMIN — CYCLOBENZAPRINE HYDROCHLORIDE 5 MG: 5 TABLET, FILM COATED ORAL at 20:48

## 2024-08-29 RX ADMIN — SALINE NASAL SPRAY 2 SPRAY: 1.5 SOLUTION NASAL at 08:38

## 2024-08-29 RX ADMIN — ONDANSETRON 4 MG: 2 INJECTION INTRAMUSCULAR; INTRAVENOUS at 17:57

## 2024-08-29 RX ADMIN — ACETAMINOPHEN 975 MG: 325 TABLET ORAL at 12:16

## 2024-08-29 RX ADMIN — GABAPENTIN 200 MG: 100 CAPSULE ORAL at 13:22

## 2024-08-29 RX ADMIN — ACETAMINOPHEN 975 MG: 325 TABLET ORAL at 22:51

## 2024-08-29 ASSESSMENT — COGNITIVE AND FUNCTIONAL STATUS - GENERAL
CLIMB 3 TO 5 STEPS WITH RAILING: 2 - A LOT
STANDING UP FROM CHAIR USING ARMS: 3 - A LITTLE
WALKING IN HOSPITAL ROOM: 3 - A LITTLE
MOVING TO AND FROM BED TO CHAIR: 3 - A LITTLE

## 2024-08-29 NOTE — CONSULTS
Rheumatology Consult Note    Brief rheumatology note.  Patient well known to me with progressive systemic sclerosis and interstitial lung disease.  She has been maintained on Actemra infusions.  Patient was recently diagnosed with presumed adenocarcinoma.  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  Discussed with primary team.     Floridalma Trevizo,   8/29/2024

## 2024-08-29 NOTE — PROGRESS NOTES
Cardiology - Pulmonary Hypertension Progress Note       Interval History:  Seen and examined lying in bed. She reports ongoing abdominal and leg pain. She reports on-going chest congestion and worsened breathing compared to baseline.     Good 24 hour urine output. AM standing weight down but not back at baseline.     Consult background from 8/9  Ms. Felix is a 63 y.o. female with a past medical history of Pulmonary HTN, HTN, Raynaud's Disease, Cutaneous Systemic Scleroderma (dx 1998), ILD, PE (3/2023). She is a patient of Dr. Nguyễn. She was previously followed by Dr. Jos Valdez at [facility]. Echocardiogram from 4/21/2022 showed LVEF: 55-60%, mild aortic valve thickening, pulmonary artery systolic pressure: 52 mmHg and trivial pericardial effusion. LHC and RHC (separate occasions) over the last 5-10 years which showed normal hemodynamics and normal coronaries. She had a RHC 9/02/2020 showing top-normal right sided filling pressures with mild pulmonary hypertension, top-normal PVR and normal pulmonary capillary wedge pressure. The cardiac index was normal, seen below.     On 6/27/2022, [place] [facility] [place]. EKG: NSR without ischemic changes. hsTrop: 12->16, d-dimer: 0.56, CXR showed cardiomegaly and diffuse interstitial prominence.     Dr. Nguyễn ordered echocardiogram, which was completed on 8/22/2022 and showed estimated RVSP = 50-55 mmHg, normal-sized LV with normal LV systolic function. Estimated EF 55- 60%. Wall motion grossly normal, mild concentric left ventricular hypertrophy, grade I LV diastolic dysfunction, probably normal RV size and function.    At her initial visit on 10/11/2022, she reported that she felt very short of breath with minimal activity most of the time. She reports that this started about 1 year prior and had progressively gotten worse. She described PND and bendopnea. She reported that she experienced ankle swelling, worsened over the past year and usually worse towards  the end of the night. She also reported some swelling in her abdomen. She reported daily palpations. I recommended a RHC which was done 11/28/2022 and showed: Normal right sided filling pressures. Mildly elevated left sided filling pressures. Precapillary pulmonary hypertension with mean PA 36 mmHg.    She was hospitalized on 3/19/2023 at [facility] for PE diagnosed on V/Q scan. She reports that she had presented with left arm pain and heaviness. She states that she was started on Apixaban. One week after her last office visit (4/6/2023), [place] [facility], epistaxis and hemoptysis. Echocardiogram showed: left ventricular cavity size normal with mild left ventricular hypertrophy and preserved systolic function. Estimated EF 65%. Chest CTA showed no evidence of PE; Apixaban was discontinued.     She had a repeat echocardiogram (6/2023) which showed: Normal left ventricular cavity size and mild concentric left ventricular hypertrophy. Normal systolic function. EF: 60%. Normal right ventricular structure and function. Mild tricuspid valve regurgitation with estimated RVSP: 55 mmHg. Compared to previous study from 4/14/2023, estimated right ventricular systolic pressure were higher.    She underwent V/Q scan in September 2023, which showed intermediate-to-high probability of pulmonary embolic disease, as a result she completed CTA Chest PE which showed: no evidence for filling defect or vessel cutoff to suggest pulmonary embolism. Enlargement of the pulmonary arteries compatible with pulmonary arterial hypertension was seen.    She reports she started Macitentan around August or September 2023. She stated that she had not been checking her SpO2. She reports she did not notice any difference since starting it. She reported she had been feeling more shortness of breath at times with ADLs.     [place] [facility] [place] with chest pressure, palpitations and nausea. She was noted to  have hypoxia and tachypnea at presentation. She was found to have moderate-to-large pericardial effusion without tamponade. She was started on Colchicine 0.6 mg BID on 3/4/2024 and when she did not improve symptomatically, Prednisone 20 mg daily was added on 3/5/2024. On 3/7/2024, she developed multiple episodes of diarrhea and Colchicine dose was decreased to 0.3 mg. Coronary CTA, done as part of the ischemic workup, showed moderate 2 vessel CAD. She was recommended to begin Atorvastatin 20 mg daily and Clopidogrel 75 mg daily. She initially refused those two medications, but eventually agreed to take Atorvastatin.      She was discharged on Colchicine 0.3 mg for at least 3 months and Prednisone 20 mg for a total two week course until 3/19/2024, then decrease the dose to 10 mg daily for 2 weeks until 4/2/2024, then decrease dose to 5 mg daily for 2 weeks until 4/16/2024. She reports she tapered off Prednisone as instructed and has continued to take Colchicine as prescribed. She completed an echocardiogram (4/10/2024) which showed: Small-to-moderate sized, circumferential pericardial effusion. No echocardiographic evidence for cardiac tamponade. Compared to previous study from 3/4/2024, no significant change. Pericardial effusion size was similar, estimated right atrial pressure lower.      [place] [facility] [place] [place] with increased pleuritic chest pain. On presentation to the ER, she was noted to be hypertensive and tachycardic. Labs showed mildly elevated troponin, elevated BNP and leukocytosis. CT angiography of the chest did not show evidence of PE. It was read as large pericardial effusion, evidence of emphysema, evidence of pulmonary hypertension, 16 mm left lobe lung mass.       She presented again to [facility] [place]21/[place]24 at the request of her Rheumatologist. During her visit there, she was noted to be tachycardic and with some shortness of breath. Her chest pain was improved from her prior admission. On  "arrival, O2 saturations were normal. ECG showed: Sinus Tachycardia (HR: 125 bpm). BNP >300, hsTrop 32 ->24. POCUS in the emergency department showed evidence of pericardial effusion with no tamponade physiology. CTA Chest did not show evidence of pulmonary embolism. There were bilateral changes radiographically concerning for volume overload. She received Methylpredinsolone 125 mg in ED. Of note, she was on Prednisone 10 mg daily at home. She was given IV diuresis. She was discharged on Furosemide 20 mg PO PRN daily for fluid weight gain / swelling.     She had brief hospitalization and was discharged on 7/23 for chest pain. She presented to the ED on 7/25/2024 for worsening of her chronic chest pain and 8/5/2024 for facial swelling and chronic chest pain.      She underwent bronchoscopic lung biopsy yesterday, 8/8/2024, with Dr. Niles Rodriguez. Pathology showed atypical glandular cells which may be consistent with adenocarcinoma in situ.     Post-procedure CXR showed increase pulmonary vascular congestion. She was admitted for optimization.        ---    Rheumatology: Dr. Trevizo  Pulmonology: Dr. Wasserman    Past Medical / Surgical History:  Pulmonary HTN, HTN, Raynaud's Disease, Cutaneous Systemic Scleroderma (dx 1998), ILD, PE (3/2023), Follicular Carcinoma of Thyroid, Tenosynovitis, de Quervain, h/o Hernia Repair, h/o Appendicitis, h/o Digit Amputation, H/o Total Thyroidectomy (Dr. Pacheco at [facility]"; 4/2013)    Family & Social History: She is , she has two children. She has worked as an .     Tobacco: former 2005  EtOH: never   Illicits: never     Her brother has CAD with stent  1st cousin with SLE  Both parents had \"heart problems\"    Allergies:   Allergies   Allergen Reactions    Aspirin Shortness of breath     Other reaction(s): Unknown  \"stop breathing\"      Ibuprofen Shortness of breath     Stop breathing    Iodine Shortness of breath     Other reaction(s): Unknown    Iodine And Iodide Containing " Products Shortness of breath     ? rash    Morphine Shortness of breath      Reported wamrth of face, improved with benadryl and cold compresses after getting morphine as well as episode of (subjective ) dyspnea, and thought she passed out - did have wamrth and erythema of face with mild edema R>L cheek notably on exam.     Penicillins Shortness of breath     Other reaction(s): Unknown    Sulfa (Sulfonamide Antibiotics) Angioedema    Clindamycin     Hydrochlorothiazide      Other reaction(s): Abdominal Pain   Slow heart rate    Oxycodone      Other reaction(s): Vomiting    Sulfamethoxazole-Trimethoprim      Other reaction(s): Vomiting, Weakness     Latex Rash       Medications:   Current Facility-Administered Medications   Medication Dose Route Frequency Provider Last Rate Last Admin    acetaminophen (TYLENOL) tablet 975 mg  975 mg oral q6h SPENCER Simon Cano DO   975 mg at 08/29/24 0600    amLODIPine (NORVASC) tablet 10 mg  10 mg oral q AM Simon Cano DO   10 mg at 08/28/24 0932    atorvastatin (LIPITOR) tablet 40 mg  40 mg oral Daily (6p) Simon Cano DO   40 mg at 08/22/24 1733    benzocaine-menthol (CEPACOL/CHLORASEPTIC) lozenge 1 lozenge  1 lozenge Mouth/Throat q2h PRN Simon Cano DO   1 lozenge at 08/28/24 0938    benzonatate (TESSALON) capsule 100 mg  100 mg oral 3x daily PRN Simon Cano DO   100 mg at 08/28/24 1344    [Provider Managed Hold] bisacodyL (DULCOLAX) 10 mg suppository 10 mg  10 mg rectal TID Molly Hidalgo CRNP   10 mg at 08/24/24 2015    carboxymethylcellulose (REFRESH PLUS) 0.5 % ophthalmic dropperette 1 drop  1 drop Right Eye 3x daily PRN Simon Cano DO   1 drop at 08/20/24 0817    clopidogreL (PLAVIX) tablet 75 mg  75 mg oral Daily Simon Cano DO   75 mg at 08/28/24 0933    cyanocobalamin (VITAMIN B12) tablet 1,000 mcg  1,000 mcg oral Daily Simon Cano DO   1,000 mcg at  08/28/24 0932    cyclobenzaprine (FLEXERIL) tablet 5 mg  5 mg oral 3x daily PRN Simon Cano DO   5 mg at 08/22/24 1404    glucose chewable tablet 16-32 g of dextrose  16-32 g of dextrose oral PRN Simon Cano DO        Or    dextrose 40 % oral gel 15-30 g of dextrose  15-30 g of dextrose oral PRN Simon Cano DO        Or    glucagon (GLUCAGEN) injection 1 mg  1 mg intramuscular PRN Simon Cano DO        Or    dextrose 50 % in water (D50) injection 12.5 g  25 mL intravenous PRN Simon aCno DO        ethacrynic acid (EDECRIN) 50 mg in sodium chloride 0.9 % 50 mL IVPB  50 mg intravenous Once Ba Figueroa MD        famotidine (PEPCID) injection 20 mg  20 mg intravenous Daily PRN Molly Hidalgo CRNP        Or    famotidine (PEPCID) tablet 20 mg  20 mg oral Daily PRN Molly Hidalgo CRNP        ferric gluconate (FERRLECIT) 125 mg in sodium chloride 0.9 % 100 mL IVPB  125 mg intravenous Daily Maddy Cope MD   Stopped at 08/28/24 1109    gabapentin (NEURONTIN) capsule 200 mg  200 mg oral TID Maddy Cope MD   200 mg at 08/28/24 2113    guaiFENesin (MUCINEX) 12 hr ER tablet 600 mg  600 mg oral BID Molly Hidalgo CRNP   600 mg at 08/28/24 1714    honey (MEDIHONEY) 100 % topical paste   Topical Daily PRN Simon Cano DO   Given at 08/27/24 2316    HYDROmorphone (DILAUDID) tablet 2 mg  2 mg oral q4h PRN Molly Hidalgo CRNP   2 mg at 08/27/24 2057    levothyroxine (SYNTHROID) tablet 100 mcg  100 mcg oral Daily (6:30a) Simon Cano DO   100 mcg at 08/29/24 0632    lidocaine (ASPERCREME) 4 % topical patch 1 patch  1 patch Topical Daily Simon Cano DO   1 patch at 08/28/24 0959    lidocaine (ASPERCREME) 4 % topical patch 1 patch  1 patch Topical Daily Simon Cano DO   1 patch at 08/28/24 0959    lidocaine (ASPERCREME) 4 % topical patch 1 patch  1 patch Topical Daily Simon Cano  DO Hoa   1 patch at 08/28/24 0958    macitentan (OPSUMIT) tablet 10 mg (Patient Owned)  10 mg oral Daily Simon Cano DO   10 mg at 08/28/24 0940    melatonin ODT 3 mg  3 mg oral Nightly PRN Simon Cano DO   3 mg at 08/28/24 2321    methylnaltrexone (RELISTOR) injection vial 4 mg  0.075 mg/kg subcutaneous Every other day Maddy Cope MD   4 mg at 08/25/24 0848    metoclopramide (REGLAN) tablet 5 mg  5 mg oral 3x daily PRN Ba Figueroa MD   5 mg at 08/28/24 1531    [Provider Managed Hold] mineral oil enema 133 mL  133 mL rectal BID Maddy Cope MD   133 mL at 08/24/24 1243    ondansetron (ZOFRAN) injection 4 mg  4 mg intravenous q6h SPENCER Molly Hidalgo CRNP   4 mg at 08/29/24 0632    pantoprazole (PROTONIX) tablet,delayed release (DR/EC) 40 mg  40 mg oral Daily Molly Hidalgo CRNP   40 mg at 08/28/24 0932    peg 400-hypromellose-glycerin (ARTIFICIAL TEARS) 1-0.2-0.2 % eye drops 1 drop  1 drop Both Eyes q4h while awake Simon Cano DO   1 drop at 08/29/24 0600    phenoL (SORE THROAT SPRAY) 1.4 % mucosal spray 1 spray  1 spray Mouth/Throat q2h PRN Simon Cano DO        senna (SENOKOT) tablet 2 tablet  2 tablet oral BID Maddy Cope MD   2 tablet at 08/28/24 0932    sildenafiL (pulm.hypertension) (REVATIO) tablet 20 mg  20 mg oral TID Simon Cano DO   20 mg at 08/28/24 2113    sodium chloride (OCEAN) 0.65 % nasal spray 2 spray  2 spray Each Nostril Daily Simon Cano DO   2 spray at 08/28/24 1000    white petrolatum (AQUAPHOR) ointment   Topical q4h PRN Simon Cano DO   Given at 08/13/24 2117    white petrolatum (AQUAPHOR) ointment   Topical BID Simon Cano DO   Given at 08/28/24 2115    white petrolatum-mineral oil (ARTIFICIAL TEARS OINT) ophthalmic ointment   Both Eyes Nightly Simon Cano DO   Given at 08/28/24 2114       Review of Systems:   All other systems reviewed  and are negative except as per HPI.    Physical Exam:     Wt Readings from Last 10 Encounters:   08/29/24 54.4 kg (119 lb 14.4 oz)   08/05/24 49.4 kg (109 lb)   08/05/24 49.4 kg (109 lb)   07/20/24 49.5 kg (109 lb 2 oz)   07/19/24 51.3 kg (113 lb)   06/20/24 51.7 kg (114 lb)   06/14/24 52.6 kg (116 lb)   05/23/24 52.4 kg (115 lb 9.6 oz)   04/17/24 53.5 kg (118 lb)   04/10/24 44.5 kg (98 lb)       BP Readings from Last 5 Encounters:   08/29/24 (!) 106/52   08/05/24 (!) 148/75   07/25/24 (!) 155/77   07/23/24 (!) 101/57   07/19/24 (!) 154/80       Vitals:    08/29/24 0717   BP: (!) 106/52   Pulse: 77   Resp:    Temp: 36.8 °C (98.2 °F)   SpO2: 98%       Constitutional: NAD, AAOx3  HENT: Normocephalic, atraumatic head, sclerae anicteric, no cervical lymphadenopathy, trachea midline, facial swelling  Cardiovascular: RRR, no murmurs, rubs or gallops, JVP: 13 cm H2O   cm H2O, no carotid bruits.  Respiratory: CTA bilateral lung fields, no wheezes, rales or rhonchi  GI: soft, non-tender/non-distended  Musculoskeletal / Extremities: +2 pulses bilateral radial, DP/PT arteries, skin tightening on face and fingertips, ulceration and discoloration of 2nd phalange b/l LE.   Skin: no rashes. Facial changes c/w scleroderma  Neuro / Psych: no focal deficits, CNII-XII grossly intact, appropriate, cooperative    Diagnostic Data:   Results from last 7 days   Lab Units 08/26/24  1211 08/25/24  1127 08/24/24  0951   WBC K/uL 9.87 8.43 7.84   HEMOGLOBIN g/dL 8.8* 6.8* 7.3*   HEMATOCRIT % 29.6* 23.3* 24.8*   MCV fL 86.5 83.8 83.5   PLATELETS K/uL 269 265 233     Results from last 7 days   Lab Units 08/26/24  1211 08/25/24  1127 08/24/24  0951   SODIUM mEQ/L 140 139 139   POTASSIUM mEQ/L 4.8 4.7 4.8   CHLORIDE mEQ/L 108* 108* 106   CO2 mEQ/L 24 27 28   BUN mg/dL 27* 34* 41*   CREATININE mg/dL 1.3* 1.5* 1.6*   CALCIUM mg/dL 9.0 8.7 9.0   GLUCOSE mg/dL 83 109* 125*   MAGNESIUM mg/dL 2.0 2.1 2.0       Component      Latest Ref Rng 7/20/2024  7/25/2024   Sodium      136 - 145 mEQ/L 142  139    Potassium, Bld      3.5 - 5.1 mEQ/L 4.2  4.3    Chloride      98 - 107 mEQ/L 107  103    CO2      21 - 31 mEQ/L 25  27    BUN      7 - 25 mg/dL 17  17    Creatinine      0.6 - 1.2 mg/dL 1.2  1.1    Glucose      70 - 99 mg/dL 85  86    Calcium      8.6 - 10.3 mg/dL 9.3  10.6 (H)    AST (SGOT)      13 - 39 IU/L  15    ALT (SGPT)      7 - 52 IU/L  8    Alkaline Phosphatase      34 - 125 IU/L  82    Total Protein      6.0 - 8.2 g/dL  8.8 (H)    Albumin      3.5 - 5.7 g/dL  4.3    Bilirubin, Total      0.3 - 1.2 mg/dL  0.4    eGFR      >=60.0 mL/min/1.73m*2 51.0 (L)  56.6 (L)    Anion Gap      3 - 15 mEQ/L 10  9       Component      Latest Ref Rng 7/20/2024 7/25/2024   WBC      3.80 - 10.50 K/uL 7.27  10.29    Hemoglobin      11.8 - 15.7 g/dL 9.8 (L)  11.2 (L)    Hematocrit      35.0 - 45.0 % 32.0 (L)  37.3    MCV      83.0 - 98.0 fL 80.4 (L)  80.4 (L)    Platelets      150 - 369 K/uL 206  331       Component      Latest Ref Rng 7/19/2024 7/25/2024 8/9/2024   High Sens Troponin I      <15.0 pg/mL 89.2 (HH)  66.8 (HH)  92.5 (HH)    High Sens Troponin I       90.3 (HH)  83.4 (HH)         Component      Latest Ref Rng 5/22/2024 6/22/2024 7/19/2024 7/25/2024   BNP      <=100 pg/mL 352 (H)  150 (H)  151 (H)  220 (H)       Component      Latest Ref Rng 4/16/2024 5/23/2024 7/21/2024 7/22/2024   Sed Rate      0 - 30 mm/hr 101 (H)  79 (H)  105 (H)  >119 (H)       Component      Latest Ref Rn 4/18/2024 5/24/2024   WBC      3.80 - 10.50 K/uL 8.40  16.22 (H)    RBC      3.93 - 5.22 M/uL 3.54 (L)  3.82 (L)    Hemoglobin      11.8 - 15.7 g/dL 9.8 (L)  10.3 (L)    Hematocrit      35.0 - 45.0 % 32.8 (L)  33.9 (L)    Platelets      150 - 369 K/uL 254  289       Component      Latest Ref Rng 5/24/2024   Magnesium      1.8 - 2.5 mg/dL 2.1      Component      Latest Ref Rn 4/13/2023 3/7/2024 5/22/2024   TSH      0.34 - 5.60 mIU/L 4.71  0.28 (L)  1.03      Component      Latest Ref Rn  5/24/2024   Sodium      136 - 145 mEQ/L 138    Potassium, Bld      3.5 - 5.1 mEQ/L 4.4    Chloride      98 - 107 mEQ/L 104    CO2      21 - 31 mEQ/L 24    BUN      7 - 25 mg/dL 40 (H)    Creatinine      0.6 - 1.2 mg/dL 1.1    Glucose      70 - 99 mg/dL 87    Calcium      8.6 - 10.3 mg/dL 9.5    eGFR      >=60.0 mL/min/1.73m*2 56.6 (L)    Anion Gap      3 - 15 mEQ/L 10      Component      Latest Ref [facility] 4/13/2023   Hemoglobin A1C      <5.7 % 4.4        Component      Latest Ref [facility] 4/14/2023   Triglycerides      30 - 149 mg/dL 44    Cholesterol      <=200 mg/dL 194    HDL      >=55 mg/dL 49 (L)    LDL Calculated      <=100 mg/dL 136 (H)    Non-HDL, Calculated      mg/dL 145    RISK      <=5.0  4.0       Component      Latest Ref [facility] 4/13/2023   High Sens Troponin I      <15.0 pg/mL 23.0 (H)       Component      Latest Ref [facility] 4/14/2023   Ferritin      11 - 250 ng/mL 75      Component      Latest Ref [facility] 4/14/2023   Iron      35 - 150 ug/dL 29 (L)    TIBC      270 - 460 ug/dL 245 (L)    UIBC      180 - 360 ug/dL 216    Iron Saturation      15 - 45 % 12 (L)        Component      Latest Ref Rn 6/27/2022 4/13/2023   BNP      <=100 pg/mL 111 (H)  105 (H)                          ---    ECG (8/29/2024, personally reviewed):   Sinus Rhythm   Lateral T-wave abnormality   HR: 81 bpm     ---    Lower extremity arterial duplex and ENRRIQUE August 10, 2024:  Right ankle ENRRIQUE (DP): 0.74.  Right ankle ENRRIQUE (PT): 0.88.  Left ankle ENRRIQUE (DP): 0.90.  Left ankle ENRRIQUE (PT): 0.62.    Findings concerning for hemodynamically significant stenosis within the  bilateral popliteal arteries and distal left femoral artery. There are also  findings which can be seen with inflow stenosis at the level left common femoral  artery. CTA of the abdomen and pelvis with lower extremity runoff could be  performed for further evaluation if clinically indicated.    CT Chest / Abdomen / Pelvis w/ Contrast (7/25/2024, personally reviewed):     Lung bases: Left-sided  pleural effusion, left parenchymal consolidation, and  findings of interstitial lung disease.  More nodular density in the medial  aspect of the left lower lobe measuring 1.6 x 1.9 cm.  Cardiomegaly with a  pericardial effusion.     Liver: The liver is normal in size.  There is no focal mass.  Hepatic veins and  portal veins are patent without focal filling defects to suggest thrombosis..     Gallbladder:  Small dependent gallstones.  No gallbladder wall thickening..     Spleen: Spleen is normal in size without focal mass lesion..     Pancreas: The pancreas is normal without focal mass, parenchymal atrophy, or  pancreatic duct dilation..     Adrenals: The adrenals are normal bilaterally without focal mass..     Kidneys, ureters and bladder:  Symmetric enhancement and excretion..  Left lower  pole cystic lesion measures 3.2 cm in maximal dimension.  This measures greater  than simple fluid density.  This measured 60 Hounsfield units on the noncontrast  CT from 2020.  It measures 36 Hounsfield units on today's study.  Therefore it  is most in keeping with a hemorrhagic/proteinaceous cyst.  Additional  hypodensities too small to characterize.     Retroperitoneal structures: There is no abdominal aortic aneurysm.  Atherosclerotic calcification.  There is no retroperitoneal adenopathy or  retroperitoneal mass..     Bowel and mesentery: No evidence of a focal inflammatory or obstructive process.  Moderate fecal retention throughout the colon.  There are a few fluid-filled  small bowel loops in the pelvis, nonspecific.     Lymph nodes: No pathologic lymphadenopathy..     Pelvis: The uterus is normal in size.  The ovaries are normal.  There is no  adnexal mass or pelvic free fluid.     Bones: Within normal limits.    No evidence for pulmonary embolism.  Persistent left-sided pleural effusion and airspace disease at the left lung  base.  Underlying interstitial lung disease.     TTE (7/19/2024, personally reviewed):   Mild  increased wall thickness with normal left ventricular cavity and preserved systolic function. EF 65%. No wall motion abnormalities.   Normal right ventricular size with preserved systolic function.   IVC normal in size with >50% respiratory variation consistent with normal right sided filling pressures.   Small pericardial effusion. No evidence of hemodynamic compromise with normal respiratory variation and no chamber collapse. Prominent epicardial fat.   Compared with previous study of 5/23/2024, small pericardial effusion persists.      PET/CT Skull-to-Thigh (6/4/2024):   An approximately 1.6 cm pulmonary nodule at the medial left lung base is  significantly FDG avid at max SUV 8.5.  This is nonspecific and can be seen in  the setting of benign pathology however malignancy is a concern.     There is more mild to moderate nonspecific uptake localizing to lita-fissural  nodules particularly on the left.     There is  moderate nonspecific left hilar and mediastinal dawson uptake.     No additional suspicious FDG avid findings appreciated.     CTA Chest (5/21/2024):   IMPRESSION:  1.  No evidence of acute pulmonary embolism.  2.  Multiple new perifissural left lower lobe nodules, suspicious for a  neoplastic process. PET/CT and or tissue sampling is recommended.  3.  Chronic interstitial lung disease in an NSIP pattern, with overall slightly  progressed appearance since March 2023. Pulmonary consultation is recommended.  4.  Stable cardiomegaly and large pericardial effusion.  5.  Mediastinal and supraclavicular adenopathy, similar to prior study, also  indeterminate, which could be further evaluated at time of PET/CT.     CTA Chest (4/16/2024):   IMPRESSION:  1. No evidence of pulmonary embolism.  2. Stable cardiomegaly and large pericardial effusion. Cardiology consultation  suggested.  3. Emphysema with bilateral lower lobe groundglass opacity and bronchiectasis in  an NSIP pattern, which can be seen with  scleroderma. Pulmonary consultation  suggested.  4. Stable 16 mm left lower lobe masslike density which may represent  atelectasis, lung cancer or lymphoma. When clinically appropriate PET/CT  suggested.  5. Stable prominence of the main pulmonary artery which can be seen with  pulmonary hypertension     TTE (5/23/2024, personally reviewed):  Mild increased wall thickness with normal left ventricular cavity and preserved systolic function. EF 65%. No wall motion abnormalities.   Normal right ventricular size with preserved systolic function.   Tiny IVC with >50% respiratory variation consistent with low-normal right sided filling pressures.   Small pericardial effusion, predominantly adjacent to right atrium. No evidence of hemodynamic compromise with normal respiratory variation and no chamber collapse. Prominent epicardial fat.   Compared with previous study of 4/10/24, small pericardial effusion persists.      TTE (4/10/2024, personally reviewed):  1. Normal left ventricular cavity size with mild concentric left ventricular hypertrophy. Normal systolic function. EF: 60%. No regional wall motion abnormalities. Cannot determine diastolic function. Global longitudinal strain not reported due to technical limitations of study.   2. Aortic valve has three cusps. Trace aortic regurgitation. Aortic valve and root sclerosis.  3. Thickened mitral valve leaflets. Trace mitral valve regurgitation. Normal left atrial size.   4. Normal right ventricular structure and function. Trace tricuspid valve regurgitation with estimated RVSP: 51 mmHg (assuming right atrial pressure of 3 mmHg). Normal right atrial size. Pulmonic valve structurally normal. Trace pulmonic valve regurgitation. Pulsed wave Doppler of the RVOT systolic profile shows decreased acceleration times and geometry consistent with mildly elevated PVR.  5. IVC size is normal with > 50% inspiratory collapse consistent with normal right atrial pressure.  Small-to-moderate sized, circumferential pericardial effusion. No echocardiographic evidence for cardiac tamponade.   6. Compared to previous study from 3/4/2024, no significant change. Pericardial effusion size is similar, estimated right atrial pressure lower.           CTA Abd / Pelvis (3/13/2024, personally reviewed):   There is dilatation of the of proximal/mid small bowel with relatively abrupt  transition left hemiabdomen, likely obstruction. Questionable lesion at the  transition point. This could be better evaluated with CT or MRI enterography.     Aorta and major branches patent. Superior mesenteric artery patent without  significant stenosis.  Suspect moderate stenosis of the origins of the of renal arteries bilaterally.     Large pericardial effusion, no change.     Patchy opacities lower lungs, similar to prior.      Coronary CTA (3/11/2024):   1. Two-vessel coronary artery disease as above that is moderate in severity.  CT  FFR is normal..  2. There is mitral annular calcification and aortic sclerosis.  The right atrium  is enlarged.  There is left ventricular hypertrophy.  There is a moderate to  large circumferential pericardial effusion.  3. Normal left ventricular wall motion and systolic function.  4. Coronary calcium score 313, 96th percentile     TTE (3/4/2024, personally reviewed):   Normal-sized left ventricle. Mild left ventricular hypertrophy. Preserved systolic function. LVEF 60%. No regional wall motion abnormalities. Grade II diastolic dysfunction.  Normal global longitudinal strain (-20%).  The aortic valve is not well seen.  Cannot determine the number of cusps.  Sclerotic leaflets.  No aortic stenosis.  Trace aortic insufficiency.  Normal sized aortic root.  The visualized portion of the ascending aorta is normal in size.  The mitral valve leaflets are thickened. Mild mitral annular calcification. Trace mitral regurgitation.   Mildly dilated left atrium.  Normal-sized right ventricle.  Normal right ventricular systolic function.  Thickened tricuspid valve. There is mild tricuspid regurgitation with estimated RVSP of 46 mmHg.   Pulmonic valve is not well visualized. Trace pulmonic regurgitation.   Mildly dilated right atrium.  IVC is enlarged with <50% respiratory variation consistent with elevated right atrial filling pressure (at least 15 mmHg).   Moderate, circumferential, circumferential pericardial effusion.  The largest pocket is located inferolaterally.  Elevated right atrial pressure.  No other clear echocardiographic features of increased pericardial pressure.  Correlate clinically.   Compared to previous TTE from 6/21/2023, pericardial effusion now moderate in size.  Right atrial pressure now elevated.         CTA Chest (3/3/2024):   IMPRESSION:  1.  No evidence of acute pulmonary embolism to the level of the segmental  branches.  2.  Moderate to large pericardial effusion measuring higher than simple fluid  density.  3.  Lower lobe lung field predominant interstitial thickening with some relative  subpleural sparing, consistent with a chronic interstitial lung disease,  unchanged from the prior study.  4.  Continued bilateral axillary, mediastinal, and bilateral hilar  lymphadenopathy, not definitely changed from the prior study, possibly related  to the patient's sarcoid.  5.  Emphysema.      CTA Chest PE (10/4/2023):  IMPRESSION:  1. No evidence for filling defect or vessel cutoff to suggest pulmonary  embolism.  Enlargement of the pulmonary arteries compatible with pulmonary  arterial hypertension.  2. Changes throughout the lungs as described above which can be seen with  systemic sclerosis/scleroderma.  Underlying pneumonia the lung bases cannot be  entirely excluded in the proper clinical setting.  3.  Additional stable findings as described above.        VQ (9/12/2023):  IMPRESSION:  Intermediate to high probability of pulmonary embolic disease.     6MWT (7/25/2023, on Sildenafil  20 mg TID):        TTE (6/21/2023, personally reviewed):  1. Normal left ventricular cavity size and mild concentric left ventricular hypertrophy. Normal systolic function. EF: 60%. No regional wall motion abnormalities. Grade I diastolic dysfunction.   2. Aortic valve cusps not well visualized. Trace aortic regurgitation. Aortic valve and root sclerosis.  3. Thickened mitral valve leaflets. Mild mitral annular calcification. Trace mitral regurgitation. Mildly dilated left atrial size.   4. Normal right ventricular structure and function. Mild tricuspid valve regurgitation with estimated RVSP: 55 mmHg (assuming right atrial pressure of 3 mmHg). Normal right atrial size. Pulmonic valve not well visualized. Trace pulmonic valve regurgitation. Pulsed wave Doppler of the RVOT systolic profile show decreased acceleration times ( msec) and late systolic notching consistent with elevated PVR.   5. IVC size is normal with > 50% inspiratory collapse consistent with normal right atrial pressure. Small pericardial effusion without echocardiographic evidence of tamponade.  6. Compared to previous study from 4/14/2023, estimated right ventricular systolic pressure is higher.        TTE (4/14/2023, personally reviewed):   The left ventricular cavity size is normal with mild left ventricular hypertrophy and preserved systolic function. Estimated EF 65%. No regional wall motion abnormalities. Grade I diastolic dysfunction.     The aortic valve is tricuspid. Aortic valve sclerosis. Trace aortic regurgitation.      The visualized portions of the aortic root and ascending aorta are normal in size.     The mitral valve is mildly thickened. Mild mitral annular calcification. Trace mitral regurgitation.       The left atrium is severely dilated.      The right ventricle is normal in size with preserved systolic function     The pulmonic valve is not well seen.     The tricuspid valve is normal in appearance. mild tricuspid  regurgitation with an estimated RVSP of 34 mmHg.       Right atrium is normal in size.     The IVC is small and collapses > 50% with inspiration consistent with normal right atrial pressures.     Small pericardial effusion, mostly posterior.       Compared to previous echocardiogram from 8/22/2022, there is no significant change        TTE (11/28/2022, personally reviewed):   Normal right- and mildly elevated left-sided filling pressures (RA: 3 mmHg, PCWP: 13 mmHg)  Elevated pulmonary artery pressures (60/22(35 mmHg)) in the setting of PCWP: 13 mmHg.   Normal cardiac output and index (CO: 5.1 L/min, CI: 3.2 L/min/m2)  PVR: 4.3 Wood units. SVR: 1840 dynes/sec/cm5      TTE (8/22/2022, personally reviewed):  Normal-sized LV. Normal LV systolic function. Estimated EF 55- 60%. Wall motion appears grossly normal. Mild concentric left ventricular hypertrophy. Grade I LV diastolic dysfunction.  Pulmonic valve not well visualized. Grossly normal pulmonic valve structure. Trace pulmonic valve regurgitation. No pulmonic valve stenosis.  Aortic valve not well visualized. Aortic valve not well seen but likely trileaflet. Focal aortic valve calcification present. Sclerotic aortic valve leaflets. Mild aortic valve regurgitation. No aortic valve stenosis.  RV not well visualized. Normal-sized RV. Normal RV systolic function.  Normal-sized RA.  There is a small loculated pericardial effusion anterior to the right atrium. No cardiac tamponade.  Sclerotic mitral valve. Mitral valve calcification of the anterior leaflet present.  Trace mitral valve regurgitation.  No significant mitral valve stenosis.  Normal-sized IVC. IVC demonstrates normal respiratory collapse.  Tricuspid valve structure is grossly normal. Mild to moderate tricuspid valve regurgitation.  Estimated RVSP = 50-55 mmHg.  Mildly dilated LA.  No previous echocardiogram available for comparison.     TTE (4/21/2022, outside study):  This result has an attachment that is  not available.     A complete transthoracic echocardiogram (including 2D, color flow   Doppler, spectral Doppler and M-mode imaging) was performed using the   standard protocol. The study quality was adequate.     The left ventricle is normal in size. There is moderately increased   wall thickness consistent with moderate concentric hypertrophy.   Endocardium is incompletely visualized, but no regional wall motion   abnormalities are noted in the visualized segments. Normal left   ventricular ejection fraction. The left ventricular ejection fraction by   visual estimate is 55% to 60%.     The right ventricle is normal in size. There is normal function of the   right ventricle.     The left atrium is normal in size. Left atrial volume is 58 mL.     The right atrial volume measures 52 mL. The right atrial volume index   measures 34 mL/m2.     There is trace mitral regurgitation. There is no mitral stenosis.     The aortic valve is trileaflet. There is mild aortic valve thickening.   There is no aortic stenosis. There is trace aortic regurgitation.     There is mild to moderate tricuspid regurgitation. Pulmonary artery   systolic pressure measures 52 mmHg. The pulmonary artery systolic pressure   is moderately elevated.     The aorta measures 3.2 cm at the sinus of Valsalva. The proximal   segment of the ascending aorta is mildly enlarged. The proximal segment of   the ascending aorta measures 3.4 cm. The proximal segment of the ascending   aorta measures 2.2 cm/m2, indexed for body surface area.     Trivial pericardial effusion present. There is no echocardiographic   evidence of cardiac tamponade.     The inferior vena cava is normal in size (diameter <21 mm) and   decreases >50% in size with inspiration, suggesting a normal right atrial   pressure of 3 mmHg (range 0-5 mm Hg).     Compared to the prior study of 3/13/2020, there are no significant   changes.        PFT (3/23/2022):      PFT (7/14/2021):      CT Chest  (5/2/2021, outside study):  CLINICAL INFORMATION: Systemic sclerosis. Interstitial lung disease. Groundglass nodule     PROCEDURE: Unenhanced CT of the chest was performed using thin section reconstructions. Images were obtained on inspiration and expiration.     COMPARISON: June and August 2020     FINDINGS:     LUNGS, PLEURA:     Groundglass opacities with reticulation with subpleural sparing in the lower lobes, without honeycombing or architectural distortion, unchanged.     Right upper lobe groundglass nodule, image 114 of series 3, 8 x 11 mm, previously 6 mm.     Expiratory images are of diagnostic quality and do not demonstrate evidence of clinically significant air trapping.     CARDIOVASCULAR, MEDIASTINUM, THYROID: Coronary calcifications. Trace pericardial effusion.     LYMPH NODES: Subcentimeter mediastinal lymph nodes, unchanged. Unchanged axillary lymph nodes.     SKELETON, CHEST WALL: No destructive bone lesion     UPPER ABDOMEN: Patulous esophagus. 3 mm right renal stone.       RHC (9/02/2020):  RA   8 mmHg   RV   40/5 mmHg   PA (mean)  44/16 (25) mmHg   PCWP   12 mmHg   CO   4.65 lpm (Thermodilution)   CI   2.65 lpm/m-squared   SVI    33 ml/beat/m-squared (lower limit normal 29)   PVR   2.8 mmHg/l/min (Wood units)   SVR   2064 dyne-sec-cm-5         PFT (3/18/2020):      TTE (3/13/2020, outside study):  · The study quality was good.   · The left ventricle is normal in size. Top normal left ventricular wall   thickness. There are no segmental wall motion abnormalities. The left   ventricular ejection fraction is 55-60% by visual estimate and 3D   echocardiography. Normal left ventricular ejection fraction.   · There is grade I (mild) LV diastolic dysfunction.   · The right ventricle is normal in size. There is normal function of the   right ventricle.   · The right atrium is mildly dilated.   · There is mild mitral valve anterior leaflet thickening. There is mild   mitral valve leaflet calcification.  There is flattening of the mitral   leaflets without raphael prolapse. There is trace mitral regurgitation.   · The aortic valve is trileaflet. There is mild thickening of the aortic   valve. There is mild calcification of the aortic valve. There is no aortic   /stenosis. There is trace aortic regurgitation.   · There is mild tricuspid valve leaflet thickening. There is mild to   moderate tricuspid regurgitation. The pulmonary artery systolic pressure   is moderately elevated. Pulmonary artery systolic pressure measures 50   mmHg. There is mid-systolic notching of the RVOT VTI consistent with   elevated pulmonary vascular resistance.   · The inferior vena cava is normal in size (diameter <21 mm) and decreases   >50% in size with inspiration, suggesting a normal right atrial pressure   of 3 mmHg (range 0-5 mm Hg).   · Trivial loculated pericardial effusion present adjacent to the right   ventricle and adjacent to the right atrium.          Assessment / Plan:     1. Chest Pain:  - Currently resolved  - Given pattern and character, I believe this has musculoskeletal / serous pain from her autoimmune disease. There may be an element of myopericarditis.  - She reports that pain has not responded to increased in steroids in the past   - Non-ACS chest pain and known non-obstructive CAD as recently as March 2024 CCTA.     2. Pulmonary Hypertension (WHO Group I, NYHA/WHO FC III):   - Occurring in the background of Systemic Scleroderma with ILD. August 2022 TTE showed normal RV size and function, but progressive exertional decline, worsened DLCO and resting tachycardia suggest there may be progression of her pulmonary vascular disease and limitation of cardiac output. This was proven with 11/2022 RHC showing mean PA 35 mmHg (with PCWP 13 mmHg) and PVR 4.3 Wood units. Recent TTEs have shown appropriate RV:PA coupling with normal RV size and systolic function.   - She is hypervolemic on exam, but improved  -Discordant weights, no  recorded I&Os  -Standing weight is higher today than yesterday from 120 to 121lbs.   -Would continue diuresis with IV ethacrynic acid 50 mg. Continue daily standing weights.  I's and O's. Dry weight in mid-to-low 110s.  - Needs labs for electrolytes and renal function today  - Continue Sildenafil 20 mg TID and uninterrupted Macitentan 10 mg daily    3. HTN:   - Continue Amlodipine.     4. Systemic Scleroderma / Raynaud's Disease:   - Per Rheumatology (Dr. Trevizo).      5. ILD:   - Per Pulmonology and Rheumatology (Dr. Trevizo). She has not tolerated trials of Mycophenolate. She has been receiving Tocilizumab.     6. PE:   - She has had elevated probability on V/Q scans, but CTA Chest has consistently not shown evidence for acute or chronic thromboembolism. Apixaban has been discontinued.     7. CAD:   - Two-vessel non-obstructive disease on March 2024 CCTA. She is continued on Atorvastatin 40 mg daily.     8. PAD with Toe Ulcerations  -S/P left PTA dSFA/TP and DCB to popliteal arteries 8/14/24. Followed by vascular surgery.   -Hematoma noted with drop in hgb - clopidogrel was held.  -She received 1 unit of blood with improvement in hgb.   -continue Clopidogrel and Atorvastatin.     9. Lung Mass  - Pathology from 8/8 biopsy with atypical glandular cells, possibly consistent with adenocarcinoma  - Management per pulmonology, outpatient follow up.     Timothy Arvizu,   8/29/2024

## 2024-08-29 NOTE — PROGRESS NOTES
Brief Nutrition Note    Recommendations   Continue current diet and supplements     Clinical Course: Patient is a 63 y.o. female who was admitted on 8/8/2024 with a diagnosis of Lung nodule [R91.1]  Thoracic lymphadenopathy [R59.0]  Pulmonary edema, acute (CMS/HCC) [J81.0].     Past Medical History:   Diagnosis Date    At risk for falls     De Quervain's tenosynovitis     Essential (primary) hypertension     Follicular carcinoma of thyroid (CMS/HCC)     Gastro-esophageal reflux     Hand ulceration (CMS/HCC)     Hyperlipidemia, unspecified     Interstitial lung disease (CMS/HCC)     Leg ulcer (CMS/HCC)     Lung nodule     Lupus (CMS/HCC)     O2 dependent     On 2L    Osteomyelitis of hand (CMS/HCC)     Osteoporosis     Pulmonary hypertension (CMS/HCC)     PVD (peripheral vascular disease) (CMS/HCC)     Raynaud's disease     Severe    Reflux esophagitis     Scleroderma (CMS/HCC)     Screening mammogram for breast cancer 05/04/2021    BI-RADS category: 1 - Negative (care everywhere @ [facility])    Vertigo     Wound, open, foot     bilateral,dressing change with medihoney and mupirocin ointment by homecare nurse     Past Surgical History:   Procedure Laterality Date    CARDIAC CATHETERIZATION      COLONOSCOPY      HERNIA REPAIR      THYROIDECTOMY         Reason for Assessment  Reason For Assessment: physician consult, per organizational policy ([facility] - CHF diet edu, F/U)  Diagnosis:  (pulmonary edema)    [facility] Nutrition Screen Tool  Has patient lost weight without trying?: 0-->No  Has patient been eating poorly due to decreased appetite?: 0-->No  [facility] Nutrition Screen Score: 0     Nutrition/Diet History  Intake (%): 100%    Physical Findings  Last Bowel Movement: 08/29/24  Skin: other (see comments) (R toe wound, L toe wound, R ankle arterial ulcer, L ankle arterial ulcer, R groin incision.)     [id number] Physical Appearance  Last Bowel Movement: 08/29/24  Skin: other (see comments) (R toe wound, L toe wound, R ankle arterial ulcer,  "L ankle arterial ulcer, R groin incision.)     Nutrition Order  Nutrition Order: meets nutritional requirements  Nutrition Order Comments: regular, 2000ml FR  Current TF/TPN Regimen: No     Anthropometrics  Height: 167.6 cm (5' 6\")     Current Weight  Weight Method: Standing scale  Weight: 54.4 kg (119 lb 14.4 oz)     Ideal Body Weight (IBW)  Ideal Body Weight (IBW) (kg): 59.58  % Ideal Body Weight: 84.51     Body Mass Index (BMI)  BMI (Calculated): 19.4  BMI Assessment: BMI 18.5-24.9: normal     Labs/Procedures/Meds  Lab Results Reviewed: reviewed, pertinent   BMP Results         08/26/24 08/25/24 08/24/24     1211 1127 0951     139 139    K 4.8 4.7 4.8    Cl 108 108 106    CO2 24 27 28    Glucose 83 109 125    BUN 27 34 41    Creatinine 1.3 1.5 1.6    Calcium 9.0 8.7 9.0     Medications  Pertinent Medications Reviewed: reviewed, pertinent Scheduled Meds:   acetaminophen  975 mg oral q6h SPENCER    amLODIPine  10 mg oral q AM    atorvastatin  40 mg oral Daily (6p)    [Provider Managed Hold] bisacodyL  10 mg rectal TID    clopidogreL  75 mg oral Daily    cyanocobalamin  1,000 mcg oral Daily    gabapentin  200 mg oral TID    guaiFENesin  600 mg oral BID    levothyroxine  100 mcg oral Daily (6:30a)    lidocaine  1 patch Topical Daily    lidocaine  1 patch Topical Daily    lidocaine  1 patch Topical Daily    macitentan  10 mg oral Daily    [Provider Managed Hold] mineral oil  133 mL rectal BID    ondansetron  4 mg intravenous q6h SPENCER    pantoprazole  40 mg oral Daily    artificial tears  1 drop Both Eyes q4h while awake    senna  2 tablet oral BID    sildenafiL (pulm.hypertension)  20 mg oral TID    sodium chloride  2 spray Each Nostril Daily    white petrolatum   Topical BID    white petrolatum-mineral oil   Both Eyes Nightly       Clinical comments:  Pt reports appetite remains fair, at about 50% of breakfast today. Reports she likes ensure plus and is consuming almost all of it twice a day. Continue current diet " and supplements.     Monitor: PO intake, plan of care  Goals: meet 75% of needs via oral intake       Recommendations: See above       Date: 08/29/24  Signature: Yancy Eagle RD

## 2024-08-29 NOTE — PROGRESS NOTES
Hospital Medicine Service -  Daily Progress Note       SUBJECTIVE   Interval History: Patient seen and examined at bedside, has been getting diuresis, today continues to report LE pain.    She was seen by pulmonology and discussed new diagnosis of lung cancer    Also requesting her dose of tocilizumab from rheumatology    Discussed with palliative for family meeting on [facility] over the phone tomorow.   OBJECTIVE      Vital signs in last 24 hours:  Temp:  [36.3 °C (97.3 °F)-36.8 °C (98.2 °F)] 36.7 °C (98 °F)  Heart Rate:  [77-96] 96  Resp:  [18-22] 22  BP: (106-137)/(52-70) 116/56    Intake/Output Summary (Last 24 hours) at 8/29/2024 1507  Last data filed at 8/29/2024 1145  Gross per 24 hour   Intake 1010 ml   Output 2000 ml   Net -990 ml       PHYSICAL EXAMINATION        Physical Exam  General:chronically ill looking, on 2l n/c  HEENT: Symmetric, PERRL/EOMI, moist membranes, NCAT  Cardiovascular: Regular rate and rhythm, normal S1/S2, no murmurs, rubs, gallops, +JVD  Pulmonary: Clear to auscultation bilaterally, no wheezing, rhonchi  GI: Soft, nontender, nondistended, bowel sounds normal, no organomegaly  Musculoskeletal: Normal bulk and tone, normal ROM  SKIN: scarring, hyper/hypopigmentation on face, scalp, upper and lower extremities, has missing digits in in bilateral hand fingers, and gangrene toes bilateral LE  Extremities: Distal pulses intact, No LE edema bilaterally  Neuro: CN2-12 intact, sensation intact, with no motor deficits  Psych: Sad mood and affect        LINES, CATHETERS, DRAINS, AIRWAYS, AND WOUNDS   Lines, Drains, and Airways:   Peripheral IV (Adult) 08/21/24 Anterior;Upper;Left Arm (Active)   Number of days: 4       Wound Anterior;Right Toe (2nd) (Active)   Number of days: 17       Wound Anterior;Left Toe (Great) (Active)   Number of days: 17       Wound Anterior;Left Toe (2nd) (Active)   Number of days: 17       Wound Arterial Ulcer Anterior;Right Ankle (Active)   Number of days: 5       Wound  Arterial Ulcer Anterior;Left Ankle (Active)   Number of days: 5       Wound Anterior;Left Toe (5th) (Active)   Number of days: 5       Surgical Incision Groin Right (Active)   Number of days: 11       Wounds (agree with documentation and present on admission)     LABS / IMAGING / TELE      Labs  Lab Results   Component Value Date    GLUCOSE 125 (H) 08/29/2024    CALCIUM 9.0 08/29/2024     08/29/2024    K 4.2 08/29/2024    CO2 24 08/29/2024     (H) 08/29/2024    BUN 28 (H) 08/29/2024    CREATININE 1.3 (H) 08/29/2024       Lab Results   Component Value Date    WBC 8.54 08/29/2024    HGB 9.1 (L) 08/29/2024    HCT 30.2 (L) 08/29/2024    MCV 86.8 08/29/2024     08/29/2024       Imaging  No results found.     Telemetry  Reviewed independently. No events.    ASSESSMENT AND PLAN        # Chronic pain -- Patient reports multiple areas of chronic pain with associated point tenderness on exam. She feels this pain is acutely worsened during this hospitalization. Imaging negative. Likely MSK in etiology. Pain has been very difficult to control due to either lack of improvement, intolerable side effects, or allergies. She has tried several regimens including IV fentanyl without improvement. PC and Pain Management following.  -Has no chest pain today  -Complains severely of bilateral lower extremity pain states left leg pain is worse than right  -Will continue gabapentin   - No reports dizziness with dilaudid  - Will stop for now  - Scheduled outpatient appointment with Pain Management, Dr. Charles on 9/23/2024 at 1:30pm     # Nausea/Vomiting, Ileus, Constipation -- Patient with persistent and worsening nausea/vomiting this week along with progressive constipation. Likely in the setting of heavy opiate use for pain control as above. KUB shows significant stool retention throughout the colon. Non-obstructive bowel gas pattern.  - Scheduled IV zofran  -Added Reglan   - Methylnaltrexone every other day, PC  approved  - S/p 1/2 dose bowel prep on 8/23 and again on 8/24 with good response  - Bisacodyl suppository TID, Mineral oil enema BID  - Overall, appears to be slowly improving     # GEORGE -- Cr up to 1.6 from baseline 1.1-1.2. Possibly prerenal iso emesis.  - Resuming ethacrynic acid for now  -Will give another dose of IV ethacrynic acid today  - Avoid nephrotoxins, Renally dose meds     # Dry gangrene of bilateral toes, PAD -- S/p LLE balloon angiogram with Vascular Surgery on 8/14.  - Local wound care  - Continue clopidogrel daily, She is allergic to aspirin  - Continue statin     # R groin hematoma, Acute blood loss anemia -- Suffered as a complication of LLE angiogram for dry gangrene/PAD as above. No active extravasation on CTA. C/b drop in Hb to ~7. FA/B12 wnl. Ferritin 24, %sat 9.  - Clopidogrel resumed on 8/17  - Hb downtrended to 6.8, given 1unit  - Today 8.8  - If concerns for hemodynamic instability/active bleeding, would recommend repeat abdominal imaging to assess for internal bleed/hematoma enlargement  - IV iron x5 doses     # ILD, Pulm HTN -- Chronic on 2L NC.   - Continue home sildenafil, macitentan  -Evaluated by cardiology today  -Resume ethacrynic acid  -IV ethacrynic acid 50 today    #Scleroderma  - On Tocilizumab as scheduled  - Will get 1 dose today  - Rheumatology consulted       # Pulmonary nodule -- S/p EBUS with path showing small focus of atypical glands. -   - Pulm discussed results as abnormal cells likely due to adenocarcinoma and will require surveillance outpatient.    # Bilateral eye pain -- Seen by Optho, drops recommended.       VTE Assessment: Padua    VTE Prophylaxis:  Current anticoagulants:    None      Code Status: Full Code      Estimated Discharge Date: 9/4/2024   Disposition Planning: D/c home pending diuresis      Ba Figueroa MD  8/29/2024

## 2024-08-29 NOTE — PLAN OF CARE
Care Coordination Discharge Plan Note     Discharge Needs Assessment  Concerns to be Addressed: discharge planning  Current Discharge Risk: chronically ill, physical impairment    Anticipated Discharge Plan  Anticipated Discharge Disposition: home with assistance, home with home health  Type of Home Care Services: nursing, home PT, home OT, home health aide, other (comments) (NP palliative care)    Patient Choice  Offered/Gave Vendor List: yes  Patient's Choice of Community Agency(s): [facility]    Patient and/or patient guardian/advocate was made aware of their right to choose a provider. A list of eligible providers was presented and reviewed with the patient and/or patient guardian/advocate in written and/or verbal form. The list delineates providers in the patient’s desired geographic area who are participating in the Medicare program and/or providers contracted with the patient’s primary insurance. The Medicare list and quality ratings were obtained from the Medicare.gov [medicare.gov] website.    ---------------------------------------------------------------------------------------------------------------------    Interdisciplinary Discharge Plan Review:  Participants:pharmacy, , physical therapy, physician, nursing, occupational therapy, patient    Concerns Comments: Tentative D/C home with [facility] . Continue CV monitoring. [employer] portable O2 tank placed in pt's room.    Discharge Plan:   Disposition/Destination: Home Health Care - Other / Home    Discharge Transportation:  Is Out of Hospital DNR needed at Discharge: no  Does patient need discharge transport? No

## 2024-08-29 NOTE — PLAN OF CARE
Plan of Care Review  Plan of Care Reviewed With: patient  Progress: improving  Outcome Evaluation: Pt AAOx4. VSS. Inj Tocilizumab started as ordered. Pt Tx in from 1 South. Oriented to room. Will cont to monitor.

## 2024-08-29 NOTE — PLAN OF CARE
Problem: Adult Inpatient Plan of Care  Goal: Patient-Specific Goal (Individualized)  Outcome: Progressing   Plan of Care Review  Plan of Care Reviewed With: patient  Progress: no change  Outcome Evaluation: pt AAOx4 VSS. Pt complains of increased BOWSER when ambulating to bathroom. Supplemental O2 increased to 3L for recovery; POX 94% on 4L

## 2024-08-29 NOTE — TELEPHONE ENCOUNTER
[facility]-post op phone call not completed. Patients procedures were on 8/14. Patient is currently still an inpatient at [facility] as of today.

## 2024-08-29 NOTE — PROGRESS NOTES
"Palliative Care Progress Note    This is Hospital Day: 22    Conversation/Goals of Care: Life prolongation and restoration. Plan for family meeting tomorrow at 1 pm.       Nausea & vomiting  Assessment & Plan  In the setting of extensive stool burden  No vomiting today, nausea improved.   -See constipation problem  -Continue Zofran 4 mg IV every 6 hours ATC  -Continue Reglan 5 mg PO TID as needed    Other constipation  Assessment & Plan  -LBM : 8/29 soft  -improved constipation since Methylnaltrexone was initiated on 8/23  -constipation likely multifactorial r/t current hospitalization, heart failure on diuretic therapy, debility related to multiple medical comorbid conditions, opioids and patient previously declining bowel regimen.   -CT abdomen 8/9 with moderate colonic stool burden   -KUB 8/22 & 8/23  showing extensive colon stool burden      Plan:  - Agree w/ Senna 2 tablets BID               Pain  Assessment & Plan  - Chronic pain attributed to: chronic chest pain, scleroderma c/b pulmonary HTN/ILD, Raynaud's Disease  - New right lower abdominal pain worse after LLE angio on 8/14, extensive stool burden may also be contributory.   -Left upper chest/rib pain improved, left foot and right groin pain is unchanged.   - Patient with numerous allergies to various pain medications (opioids/NSAIDS). She reports no relief with Fentanyl.  She has used dilaudid in the past with some relief.   - PDMP queried: no recent scripts  - Home regimen: lidocaine patch  - Medications available inpatient: Acetaminophen 975 mg p.o. every 6 hours, lidocaine patches, cyclobenzaprine 5 mg p.o. 3 times daily as needed, heating pad  - Tramadol d/c'd 2/2 dizziness  - lyrica d/c'd 2/2 blurred vision  -Continue Gabapentin to 200 mg TID (continue to monitor renal function in the setting of n/v)  -Recommend discontinuing dilaudid at the patient's request as she believes it causes her to be \"delirious\".   -Agree w/ ATC Tylenol 975 mg every 6 " "hours     Plan:  - Follow up with pain management as outpt        Palliative care by specialist  Assessment & Plan  63 y.o. female with a PMH significant for severe scleroderma complicated by pulmonary hypertension, ILD.  She presented to [facility] on 8/8 for planned robotic navigational and EBUS bronchoscopies to evaluate growing LLL nodule c/b chest pain, volume overload, n/v and pain.  Lung biopsy results are c/f adenocarcinoma.     - Code status: Full Code  - Prognosis:  High risk for acute deterioration and decline  - Capacity for Medical Decision Making: intact  - Advance Directives: No  - Goals of care: Goals are Life-prolonging/disease directed.  Refer to ACP note dated 8/10. Plan for GOC discussion 8/30 at 1pm   - Surrogate Decision Maker: Patient identifies her 2 adult children, Tiffanie and Wagner, as joint surrogate decision makers        Debility  Assessment & Plan  - Debility likely multifactorial in the setting of multiple chronic medical conditions including scleroderma, CHF, PVD s/p LLE angio c/b retroperitoneal hematoma.   - Living situation prior to hospitalization: Lives at home with 24/7 aides  - Baseline functional status is: Ambulatory short distances  - Current Palliative Performance Status: 40%  - Baseline Palliative Performance Status:  50-60%    - Frailty   - Patient remains high risk for further deterioration and functional decline.    Plan  - Patient open to both palliative bridge and home based palliative NP visits            Interval History (Subjective)    Source: daughter, chart review, the patient, nurse and the primary medical team    Patient resting in bed, left foot and right groin pain is unchanged, remains 10/10.  Patient requesting to discontinue dilaudid for fear that it is causing her to be \"delirious\". No vomiting today, nausea improved, reflux, +soft BM today, no diarrhea.     Current Medications:    acetaminophen, 975 mg, oral, q6h SPENCER    amLODIPine, 10 mg, oral, q AM    " atorvastatin, 40 mg, oral, Daily (6p)    benzocaine-menthol, 1 lozenge, Mouth/Throat, q2h PRN    benzonatate, 100 mg, oral, 3x daily PRN    [Provider Managed Hold] bisacodyL, 10 mg, rectal, TID    carboxymethylcellulose, 1 drop, Right Eye, 3x daily PRN    clopidogreL, 75 mg, oral, Daily    cyanocobalamin, 1,000 mcg, oral, Daily    cyclobenzaprine, 5 mg, oral, 3x daily PRN    glucose, 16-32 g of dextrose, oral, PRN **OR** dextrose, 15-30 g of dextrose, oral, PRN **OR** glucagon, 1 mg, intramuscular, PRN **OR** dextrose 50 % in water (D50), 25 mL, intravenous, PRN    famotidine, 20 mg, intravenous, Daily PRN **OR** famotidine, 20 mg, oral, Daily PRN    gabapentin, 200 mg, oral, TID    guaiFENesin, 600 mg, oral, BID    honey, , Topical, Daily PRN    [Provider Managed Hold] HYDROmorphone, 2 mg, oral, q4h PRN    levothyroxine, 100 mcg, oral, Daily (6:30a)    lidocaine, 1 patch, Topical, Daily    lidocaine, 1 patch, Topical, Daily    lidocaine, 1 patch, Topical, Daily    macitentan, 10 mg, oral, Daily    melatonin, 3 mg, oral, Nightly PRN    metoclopramide, 5 mg, oral, 3x daily PRN    [Provider Managed Hold] mineral oil, 133 mL, rectal, BID    NON FORMULARY MEDICATION REQUEST, 400 mg, intravenous, Once    [DISCONTINUED] ondansetron ODT, 4 mg, oral, q6h PRN **OR** ondansetron, 4 mg, intravenous, q6h SPENCER    pantoprazole, 40 mg, oral, Daily    artificial tears, 1 drop, Both Eyes, q4h while awake    phenol, 1 spray, Mouth/Throat, q2h PRN    senna, 2 tablet, oral, BID    sildenafiL (pulm.hypertension), 20 mg, oral, TID    sodium chloride, 2 spray, Each Nostril, Daily    white petrolatum, , Topical, q4h PRN    white petrolatum, , Topical, BID    white petrolatum-mineral oil, , Both Eyes, Nightly    clopidogreL    cyclobenzaprine    ethacrynic acid    gabapentin    honey    ondansetron    polyethylene glycol    Objective    Physical Exam:  General:   No Acute Distress  Eyes:  Sclera Anicteric  ENMT:  Mucus Membranes Moist  CV:   Radial Pulses 3  bilateral  Lungs:  No evidence of increased WOB  Abdomen:  Bowel Sounds present  Psych:  Appropriate and Cooperative  Neuro:  Awake, Alert and Oriented  person, place, time/date and situation  Skin:  No Rash    Vitals:  Vitals:    08/29/24 1048   BP: (!) 116/56   Pulse: 96   Resp:    Temp: 36.7 °C (98 °F)   SpO2: 95%       Laboratory Studies:    CBC Results         08/29/24 08/26/24 08/25/24     1126 1211 1127    WBC 8.54 9.87 8.43    RBC 3.48 3.42 2.78    HGB 9.1 8.8 6.8    HCT 30.2 29.6 23.3    MCV 86.8 86.5 83.8    MCH 26.1 25.7 24.5    MCHC 30.1 29.7 29.2     269 265           Comment for HGB at 1127 on 08/25/24: ALL RESULTS HAVE BEEN CHECKED This result has been called to brody bolivar by Eliecer on 08/25/2024 12:06:33, and has been read back.           CMP Results         08/29/24 08/26/24 08/25/24     1126 1211 1127     140 139    K 4.2 4.8 4.7    Cl 109 108 108    CO2 24 24 27    Glucose 125 83 109    BUN 28 27 34    Creatinine 1.3 1.3 1.5    Calcium 9.0 9.0 8.7    Anion Gap 9 8 4    EGFR 46.3 46.3 39.0       Troponin I Results         08/09/24 08/09/24 07/25/24     1232 1048 1732    HS Troponin I 93.8 92.5 66.8       ABG Results         04/13/23     1949    Source Of Oxygen nc         Labs reviewed-significant for elevated  BUN/creatine and anemia    Imaging and Other Studies:     Radiology Imaging    XR ABDOMEN 1 VW    Narrative  CLINICAL HISTORY: Ileus follow-up    PRIOR STUDY: Abdominal x-ray dated 8/23/2024    TECHNIQUE: AP abdomen and pelvis    COMMENT:  There is a moderate to large amount of stool noted within the colon.  No definite bowel dilatation.    Impression  IMPRESSION: See above.                                                                      Cardiac Imaging    TRANSTHORACIC ECHO (TTE) LIMITED 07/19/2024    Interpretation Summary  Rhythm is sinus tachycardia.    Mild increased wall thickness with normal left ventricular cavity and preserved systolic  function. EF 65%. No wall motion abnormalities.  Normal right ventricular size with preserved systolic function.  IVC normal in size with >50% respiratory variation consistent with normal right sided filling pressures.  Small pericardial effusion. No evidence of hemodynamic compromise with normal respiratory variation and no chamber collapse. Prominent epicardial fat.  Compared with previous study of 5/23/2024, small pericardial effusion persists.       No new imaging since 8/23  Lab Results   Component Value Date    QTCCALCULAT 450 08/29/2024       KARYNA Pop  Palliative Care Nurse Practitioner  [facility]  [phone] [facility] [place]  [phone] [facility] [place]  [address]  [place] [phone]  Pager [phone]

## 2024-08-30 LAB
ANION GAP SERPL CALC-SCNC: 5 MEQ/L (ref 3–15)
ATRIAL RATE: 74
BUN SERPL-MCNC: 28 MG/DL (ref 7–25)
CALCIUM SERPL-MCNC: 9 MG/DL (ref 8.6–10.3)
CHLORIDE SERPL-SCNC: 108 MEQ/L (ref 98–107)
CO2 SERPL-SCNC: 27 MEQ/L (ref 21–31)
CREAT SERPL-MCNC: 1.3 MG/DL (ref 0.6–1.2)
EGFRCR SERPLBLD CKD-EPI 2021: 46.3 ML/MIN/1.73M*2
ERYTHROCYTE [DISTWIDTH] IN BLOOD BY AUTOMATED COUNT: 23.7 % (ref 11.7–14.4)
GLUCOSE SERPL-MCNC: 76 MG/DL (ref 70–99)
HCT VFR BLD AUTO: 30 % (ref 35–45)
HGB BLD-MCNC: 9 G/DL (ref 11.8–15.7)
MAGNESIUM SERPL-MCNC: 1.8 MG/DL (ref 1.8–2.5)
MCH RBC QN AUTO: 26 PG (ref 28–33.2)
MCHC RBC AUTO-ENTMCNC: 30 G/DL (ref 32.2–35.5)
MCV RBC AUTO: 86.7 FL (ref 83–98)
P AXIS: 44
PDW BLD AUTO: 11.4 FL (ref 9.4–12.3)
PLATELET # BLD AUTO: 292 K/UL (ref 150–369)
POTASSIUM SERPL-SCNC: 4.9 MEQ/L (ref 3.5–5.1)
PR INTERVAL: 144
QRS DURATION: 78
QT INTERVAL: 412
QTC CALCULATION(BAZETT): 457
R AXIS: 7
RBC # BLD AUTO: 3.46 M/UL (ref 3.93–5.22)
SODIUM SERPL-SCNC: 140 MEQ/L (ref 136–145)
T WAVE AXIS: 91
VENTRICULAR RATE: 74
WBC # BLD AUTO: 5.19 K/UL (ref 3.8–10.5)

## 2024-08-30 PROCEDURE — 93010 ELECTROCARDIOGRAM REPORT: CPT | Performed by: INTERNAL MEDICINE

## 2024-08-30 PROCEDURE — 63600000 HC DRUGS/DETAIL CODE: Mod: JZ | Performed by: INTERNAL MEDICINE

## 2024-08-30 PROCEDURE — 63700000 HC SELF-ADMINISTRABLE DRUG: Performed by: INTERNAL MEDICINE

## 2024-08-30 PROCEDURE — 63700000 HC SELF-ADMINISTRABLE DRUG: Performed by: STUDENT IN AN ORGANIZED HEALTH CARE EDUCATION/TRAINING PROGRAM

## 2024-08-30 PROCEDURE — 36415 COLL VENOUS BLD VENIPUNCTURE: CPT | Performed by: INTERNAL MEDICINE

## 2024-08-30 PROCEDURE — 85027 COMPLETE CBC AUTOMATED: CPT | Performed by: INTERNAL MEDICINE

## 2024-08-30 PROCEDURE — 21400000 HC ROOM AND CARE CCU/INTERMEDIATE

## 2024-08-30 PROCEDURE — 83735 ASSAY OF MAGNESIUM: CPT | Performed by: INTERNAL MEDICINE

## 2024-08-30 PROCEDURE — 99233 SBSQ HOSP IP/OBS HIGH 50: CPT | Performed by: INTERNAL MEDICINE

## 2024-08-30 PROCEDURE — 63600000 HC DRUGS/DETAIL CODE: Mod: JZ | Performed by: NURSE PRACTITIONER

## 2024-08-30 PROCEDURE — 99232 SBSQ HOSP IP/OBS MODERATE 35: CPT | Performed by: INTERNAL MEDICINE

## 2024-08-30 PROCEDURE — 63700000 HC SELF-ADMINISTRABLE DRUG: Performed by: NURSE PRACTITIONER

## 2024-08-30 PROCEDURE — 99497 ADVNCD CARE PLAN 30 MIN: CPT | Performed by: NURSE PRACTITIONER

## 2024-08-30 PROCEDURE — 80048 BASIC METABOLIC PNL TOTAL CA: CPT | Performed by: INTERNAL MEDICINE

## 2024-08-30 PROCEDURE — 93005 ELECTROCARDIOGRAM TRACING: CPT | Performed by: NURSE PRACTITIONER

## 2024-08-30 RX ORDER — ETHACRYNIC ACID 25 MG/1
200 TABLET ORAL ONCE
Status: COMPLETED | OUTPATIENT
Start: 2024-08-30 | End: 2024-08-30

## 2024-08-30 RX ORDER — DICYCLOMINE HYDROCHLORIDE 10 MG/1
10 CAPSULE ORAL 4 TIMES DAILY PRN
Status: DISCONTINUED | OUTPATIENT
Start: 2024-08-30 | End: 2024-09-03 | Stop reason: HOSPADM

## 2024-08-30 RX ORDER — DIPHENHYDRAMINE HCL 25 MG
25 CAPSULE ORAL ONCE
Status: COMPLETED | OUTPATIENT
Start: 2024-08-30 | End: 2024-08-30

## 2024-08-30 RX ORDER — METOLAZONE 2.5 MG/1
2.5 TABLET ORAL DAILY
Status: DISCONTINUED | OUTPATIENT
Start: 2024-08-30 | End: 2024-08-31

## 2024-08-30 RX ORDER — SIMETHICONE 80 MG
80 TABLET,CHEWABLE ORAL 4 TIMES DAILY PRN
Status: DISCONTINUED | OUTPATIENT
Start: 2024-08-30 | End: 2024-09-03 | Stop reason: HOSPADM

## 2024-08-30 RX ADMIN — LEVOTHYROXINE SODIUM 100 MCG: 0.1 TABLET ORAL at 05:56

## 2024-08-30 RX ADMIN — ONDANSETRON 4 MG: 2 INJECTION INTRAMUSCULAR; INTRAVENOUS at 05:16

## 2024-08-30 RX ADMIN — GLYCERIN 1 DROP: .002; .002; .01 SOLUTION/ DROPS OPHTHALMIC at 05:16

## 2024-08-30 RX ADMIN — SALINE NASAL SPRAY 2 SPRAY: 1.5 SOLUTION NASAL at 08:40

## 2024-08-30 RX ADMIN — SILDENAFIL 20 MG: 20 TABLET, FILM COATED ORAL at 20:01

## 2024-08-30 RX ADMIN — GLYCERIN 1 DROP: .002; .002; .01 SOLUTION/ DROPS OPHTHALMIC at 17:00

## 2024-08-30 RX ADMIN — GUAIFENESIN 600 MG: 600 TABLET ORAL at 20:01

## 2024-08-30 RX ADMIN — WHITE PETROLATUM: 1.75 OINTMENT TOPICAL at 08:39

## 2024-08-30 RX ADMIN — GUAIFENESIN 600 MG: 600 TABLET ORAL at 08:38

## 2024-08-30 RX ADMIN — ONDANSETRON 4 MG: 2 INJECTION INTRAMUSCULAR; INTRAVENOUS at 17:00

## 2024-08-30 RX ADMIN — SENNOSIDES 2 TABLET: 8.6 TABLET, FILM COATED ORAL at 08:38

## 2024-08-30 RX ADMIN — AMLODIPINE BESYLATE 10 MG: 10 TABLET ORAL at 08:38

## 2024-08-30 RX ADMIN — CYANOCOBALAMIN TAB 1000 MCG 1000 MCG: 1000 TAB at 08:38

## 2024-08-30 RX ADMIN — ONDANSETRON 4 MG: 2 INJECTION INTRAMUSCULAR; INTRAVENOUS at 00:52

## 2024-08-30 RX ADMIN — ACETAMINOPHEN 975 MG: 325 TABLET ORAL at 16:59

## 2024-08-30 RX ADMIN — DIPHENHYDRAMINE HYDROCHLORIDE 25 MG: 25 CAPSULE ORAL at 19:19

## 2024-08-30 RX ADMIN — ACETAMINOPHEN 975 MG: 325 TABLET ORAL at 10:38

## 2024-08-30 RX ADMIN — GABAPENTIN 200 MG: 100 CAPSULE ORAL at 13:45

## 2024-08-30 RX ADMIN — ONDANSETRON 4 MG: 2 INJECTION INTRAMUSCULAR; INTRAVENOUS at 23:06

## 2024-08-30 RX ADMIN — SILDENAFIL 20 MG: 20 TABLET, FILM COATED ORAL at 08:38

## 2024-08-30 RX ADMIN — GLYCERIN 1 DROP: .002; .002; .01 SOLUTION/ DROPS OPHTHALMIC at 08:39

## 2024-08-30 RX ADMIN — CLOPIDOGREL 75 MG: 75 TABLET ORAL at 08:39

## 2024-08-30 RX ADMIN — ETHACRYNIC ACID 200 MG: 25 TABLET ORAL at 11:30

## 2024-08-30 RX ADMIN — MAGNESIUM SULFATE HEPTAHYDRATE 2 G: 40 INJECTION, SOLUTION INTRAVENOUS at 08:47

## 2024-08-30 RX ADMIN — PANTOPRAZOLE SODIUM 40 MG: 40 TABLET, DELAYED RELEASE ORAL at 08:38

## 2024-08-30 RX ADMIN — GLYCERIN 1 DROP: .002; .002; .01 SOLUTION/ DROPS OPHTHALMIC at 21:20

## 2024-08-30 RX ADMIN — GABAPENTIN 200 MG: 100 CAPSULE ORAL at 20:01

## 2024-08-30 RX ADMIN — ONDANSETRON 4 MG: 2 INJECTION INTRAMUSCULAR; INTRAVENOUS at 11:32

## 2024-08-30 RX ADMIN — BISACODYL 10 MG: 10 ENEMA RECTAL at 11:40

## 2024-08-30 RX ADMIN — DICYCLOMINE HYDROCHLORIDE 10 MG: 10 CAPSULE ORAL at 05:17

## 2024-08-30 RX ADMIN — SILDENAFIL 20 MG: 20 TABLET, FILM COATED ORAL at 13:45

## 2024-08-30 RX ADMIN — GABAPENTIN 200 MG: 100 CAPSULE ORAL at 08:37

## 2024-08-30 RX ADMIN — METOLAZONE 2.5 MG: 2.5 TABLET ORAL at 10:38

## 2024-08-30 RX ADMIN — SIMETHICONE 80 MG: 80 TABLET, CHEWABLE ORAL at 05:17

## 2024-08-30 ASSESSMENT — COGNITIVE AND FUNCTIONAL STATUS - GENERAL
WALKING IN HOSPITAL ROOM: 3 - A LITTLE
STANDING UP FROM CHAIR USING ARMS: 3 - A LITTLE
WALKING IN HOSPITAL ROOM: 3 - A LITTLE
MOVING TO AND FROM BED TO CHAIR: 3 - A LITTLE
STANDING UP FROM CHAIR USING ARMS: 3 - A LITTLE
MOVING TO AND FROM BED TO CHAIR: 3 - A LITTLE
CLIMB 3 TO 5 STEPS WITH RAILING: 3 - A LITTLE
CLIMB 3 TO 5 STEPS WITH RAILING: 3 - A LITTLE

## 2024-08-30 NOTE — PROGRESS NOTES
Cardiology - Pulmonary Hypertension Progress Note       Interval History:  Seen and examined sitting up in bed. She reports increased abdominal fullness with persistent abdominal and leg pain. She reports on-going chest congestion and production of sputum.     Fair 24 hour urine output. AM standing weight performed on different scale due to change of floor.     Consult background from 8/9  Ms. Felix is a 63 y.o. female with a past medical history of Pulmonary HTN, HTN, Raynaud's Disease, Cutaneous Systemic Scleroderma (dx 1998), ILD, PE (3/2023). She is a patient of Dr. Nguyễn. She was previously followed by Dr. Jos Valdez at [facility]. Echocardiogram from 4/21/2022 showed LVEF: 55-60%, mild aortic valve thickening, pulmonary artery systolic pressure: 52 mmHg and trivial pericardial effusion. LHC and RHC (separate occasions) over the last 5-10 years which showed normal hemodynamics and normal coronaries. She had a RHC 9/02/2020 showing top-normal right sided filling pressures with mild pulmonary hypertension, top-normal PVR and normal pulmonary capillary wedge pressure. The cardiac index was normal, seen below.     On 6/27/2022, she was in [facility] ER for chest pain. EKG: NSR without ischemic changes. hsTrop: 12->16, d-dimer: 0.56, CXR showed cardiomegaly and diffuse interstitial prominence.     Dr. Nguyễn ordered echocardiogram, which was completed on 8/22/2022 and showed estimated RVSP = 50-55 mmHg, normal-sized LV with normal LV systolic function. Estimated EF 55- 60%. Wall motion grossly normal, mild concentric left ventricular hypertrophy, grade I LV diastolic dysfunction, probably normal RV size and function.    At her initial visit on 10/11/2022, she reported that she felt very short of breath with minimal activity most of the time. She reports that this started about 1 year prior and had progressively gotten worse. She described PND and bendopnea. She reported that she experienced ankle swelling, worsened  over the past year and usually worse towards the end of the night. She also reported some swelling in her abdomen. She reported daily palpations. I recommended a RHC which was done 11/28/2022 and showed: Normal right sided filling pressures. Mildly elevated left sided filling pressures. Precapillary pulmonary hypertension with mean PA 36 mmHg.    She was hospitalized on 3/19/2023 at [facility] for PE diagnosed on V/Q scan. She reports that she had presented with left arm pain and heaviness. She states that she was started on Apixaban. One week after her last office visit (4/6/2023), she presented to [facility] with chest pain, epistaxis and hemoptysis. Echocardiogram showed: left ventricular cavity size normal with mild left ventricular hypertrophy and preserved systolic function. Estimated EF 65%. Chest CTA showed no evidence of PE; Apixaban was discontinued.     She had a repeat echocardiogram (6/2023) which showed: Normal left ventricular cavity size and mild concentric left ventricular hypertrophy. Normal systolic function. EF: 60%. Normal right ventricular structure and function. Mild tricuspid valve regurgitation with estimated RVSP: 55 mmHg. Compared to previous study from 4/14/2023, estimated right ventricular systolic pressure were higher.    She underwent V/Q scan in September 2023, which showed intermediate-to-high probability of pulmonary embolic disease, as a result she completed CTA Chest PE which showed: no evidence for filling defect or vessel cutoff to suggest pulmonary embolism. Enlargement of the pulmonary arteries compatible with pulmonary arterial hypertension was seen.    She reports she started Macitentan around August or September 2023. She stated that she had not been checking her SpO2. She reports she did not notice any difference since starting it. She reported she had been feeling more shortness of breath at times with ADLs.     She presented to [facility] on 3/3/2024 with chest  pressure, palpitations and nausea. She was noted to have hypoxia and tachypnea at presentation. She was found to have moderate-to-large pericardial effusion without tamponade. She was started on Colchicine 0.6 mg BID on 3/4/2024 and when she did not improve symptomatically, Prednisone 20 mg daily was added on 3/5/2024. On 3/7/2024, she developed multiple episodes of diarrhea and Colchicine dose was decreased to 0.3 mg. Coronary CTA, done as part of the ischemic workup, showed moderate 2 vessel CAD. She was recommended to begin Atorvastatin 20 mg daily and Clopidogrel 75 mg daily. She initially refused those two medications, but eventually agreed to take Atorvastatin.      She was discharged on Colchicine 0.3 mg for at least 3 months and Prednisone 20 mg for a total two week course until 3/19/2024, then decrease the dose to 10 mg daily for 2 weeks until 4/2/2024, then decrease dose to 5 mg daily for 2 weeks until 4/16/2024. She reports she tapered off Prednisone as instructed and has continued to take Colchicine as prescribed. She completed an echocardiogram (4/10/2024) which showed: Small-to-moderate sized, circumferential pericardial effusion. No echocardiographic evidence for cardiac tamponade. Compared to previous study from 3/4/2024, no significant change. Pericardial effusion size was similar, estimated right atrial pressure lower.      She presented to [facility] (4/16/2024) with increased pleuritic chest pain. On presentation to the ER, she was noted to be hypertensive and tachycardic. Labs showed mildly elevated troponin, elevated BNP and leukocytosis. CT angiography of the chest did not show evidence of PE. It was read as large pericardial effusion, evidence of emphysema, evidence of pulmonary hypertension, 16 mm left lobe lung mass.       She presented again to [facility] on 5/21/2024 at the request of her Rheumatologist. During her visit there, she was noted to be tachycardic and with some shortness of breath. Her  "chest pain was improved from her prior admission. On arrival, O2 saturations were normal. ECG showed: Sinus Tachycardia (HR: 125 bpm). BNP >300, hsTrop 32 ->24. POCUS in the emergency department showed evidence of pericardial effusion with no tamponade physiology. CTA Chest did not show evidence of pulmonary embolism. There were bilateral changes radiographically concerning for volume overload. She received Methylpredinsolone 125 mg in ED. Of note, she was on Prednisone 10 mg daily at home. She was given IV diuresis. She was discharged on Furosemide 20 mg PO PRN daily for fluid weight gain / swelling.     She had brief hospitalization and was discharged on 7/23 for chest pain. She presented to the ED on 7/25/2024 for worsening of her chronic chest pain and 8/5/2024 for facial swelling and chronic chest pain.      She underwent bronchoscopic lung biopsy yesterday, 8/8/2024, with Dr. Niles Rodriguez. Pathology showed atypical glandular cells which may be consistent with adenocarcinoma in situ.     Post-procedure CXR showed increase pulmonary vascular congestion. She was admitted for optimization.        ---    Rheumatology: Dr. Trevizo  Pulmonology: Dr. Wasserman    Past Medical / Surgical History:  Pulmonary HTN, HTN, Raynaud's Disease, Cutaneous Systemic Scleroderma (dx 1998), ILD, PE (3/2023), Follicular Carcinoma of Thyroid, Tenosynovitis, de Quervain, h/o Hernia Repair, h/o Appendicitis, h/o Digit Amputation, H/o Total Thyroidectomy (Dr. Pacheco at [facility]; 4/2013)    Family & Social History: She is , she has two children. She has worked as an .     Tobacco: former 2005  EtOH: never   Illicits: never     Her brother has CAD with stent  1st cousin with SLE  Both parents had \"heart problems\"    Allergies:   Allergies   Allergen Reactions    Aspirin Shortness of breath     Other reaction(s): Unknown  \"stop breathing\"      Ibuprofen Shortness of breath     Stop breathing    Iodine Shortness of breath     Other " reaction(s): Unknown    Iodine And Iodide Containing Products Shortness of breath     ? rash    Morphine Shortness of breath      Reported wamrth of face, improved with benadryl and cold compresses after getting morphine as well as episode of (subjective ) dyspnea, and thought she passed out - did have wamrth and erythema of face with mild edema R>L cheek notably on exam.     Penicillins Shortness of breath     Other reaction(s): Unknown    Sulfa (Sulfonamide Antibiotics) Angioedema    Clindamycin     Hydrochlorothiazide      Other reaction(s): Abdominal Pain   Slow heart rate    Oxycodone      Other reaction(s): Vomiting    Sulfamethoxazole-Trimethoprim      Other reaction(s): Vomiting, Weakness     Latex Rash       Medications:   Current Facility-Administered Medications   Medication Dose Route Frequency Provider Last Rate Last Admin    acetaminophen (TYLENOL) tablet 975 mg  975 mg oral q6h SPENCER Simon Cano DO   975 mg at 08/29/24 2251    amLODIPine (NORVASC) tablet 10 mg  10 mg oral q AM Simon Cano DO   10 mg at 08/30/24 0838    atorvastatin (LIPITOR) tablet 40 mg  40 mg oral Daily (6p) Simon Cano DO   40 mg at 08/22/24 1733    benzocaine-menthol (CEPACOL/CHLORASEPTIC) lozenge 1 lozenge  1 lozenge Mouth/Throat q2h PRN Simon Cano DO   1 lozenge at 08/28/24 0938    benzonatate (TESSALON) capsule 100 mg  100 mg oral 3x daily PRN Simon Cano DO   100 mg at 08/28/24 1344    [Provider Managed Hold] bisacodyL (DULCOLAX) 10 mg suppository 10 mg  10 mg rectal TID Molly Hidalgo CRNP   10 mg at 08/24/24 2015    carboxymethylcellulose (REFRESH PLUS) 0.5 % ophthalmic dropperette 1 drop  1 drop Right Eye 3x daily PRN Simon Cano DO   1 drop at 08/20/24 0817    clopidogreL (PLAVIX) tablet 75 mg  75 mg oral Daily Simon Cano DO   75 mg at 08/30/24 0839    cyanocobalamin (VITAMIN B12) tablet 1,000 mcg  1,000 mcg oral  Daily Simon Cano DO   1,000 mcg at 08/30/24 0838    cyclobenzaprine (FLEXERIL) tablet 5 mg  5 mg oral 3x daily PRN Simon Cano DO   5 mg at 08/29/24 2048    glucose chewable tablet 16-32 g of dextrose  16-32 g of dextrose oral PRN Simon Cano DO        Or    dextrose 40 % oral gel 15-30 g of dextrose  15-30 g of dextrose oral PRN Simon Cano DO        Or    glucagon (GLUCAGEN) injection 1 mg  1 mg intramuscular PRN Simon Cano DO        Or    dextrose 50 % in water (D50) injection 12.5 g  25 mL intravenous PRN Simon Cano DO        dicyclomine (BENTYL) capsule 10 mg  10 mg oral 4x daily PRN Ritu Peterson DO   10 mg at 08/30/24 0517    famotidine (PEPCID) injection 20 mg  20 mg intravenous Daily PRN Molly Hidalgo CRNP        Or    famotidine (PEPCID) tablet 20 mg  20 mg oral Daily PRN Molly Hidalgo CRNP   20 mg at 08/29/24 2049    gabapentin (NEURONTIN) capsule 200 mg  200 mg oral TID Maddy Cope MD   200 mg at 08/30/24 0837    guaiFENesin (MUCINEX) 12 hr ER tablet 600 mg  600 mg oral BID Molly Hidalgo CRNP   600 mg at 08/30/24 0838    honey (MEDIHONEY) 100 % topical paste   Topical Daily PRN Simon Cano DO   Given at 08/27/24 2316    [Provider Managed Hold] HYDROmorphone (DILAUDID) tablet 2 mg  2 mg oral q4h PRN Molly Hidalgo CRNP   2 mg at 08/27/24 2057    levothyroxine (SYNTHROID) tablet 100 mcg  100 mcg oral Daily (6:30a) Simon Cano DO   100 mcg at 08/30/24 0556    lidocaine (ASPERCREME) 4 % topical patch 1 patch  1 patch Topical Daily Simon Cano DO   1 patch at 08/29/24 1400    lidocaine (ASPERCREME) 4 % topical patch 1 patch  1 patch Topical Daily Simon Cano, DO   1 patch at 08/29/24 1400    lidocaine (ASPERCREME) 4 % topical patch 1 patch  1 patch Topical Daily Simon Cano, DO   1 patch at 08/29/24 1400    macitentan (OPSUMIT) tablet  10 mg (Patient Owned)  10 mg oral Daily Simon Cano DO   10 mg at 08/29/24 0844    magnesium sulfate IVPB 2g in 50 mL NSS/D5W/SWFI  2 g intravenous Once Ba Figueroa MD 25 mL/hr at 08/30/24 0847 2 g at 08/30/24 0847    melatonin ODT 3 mg  3 mg oral Nightly PRN Simon Cano DO   3 mg at 08/28/24 2321    metoclopramide (REGLAN) tablet 5 mg  5 mg oral 3x daily PRN Ba Figueroa MD   5 mg at 08/28/24 1531    [Provider Managed Hold] mineral oil enema 133 mL  133 mL rectal BID Maddy Cope MD   133 mL at 08/24/24 1243    ondansetron (ZOFRAN) injection 4 mg  4 mg intravenous q6h SPENCER Molly Hidalgo CRNP   4 mg at 08/30/24 0516    pantoprazole (PROTONIX) tablet,delayed release (DR/EC) 40 mg  40 mg oral Daily Molly Hidalgo CRNP   40 mg at 08/30/24 0838    peg 400-hypromellose-glycerin (ARTIFICIAL TEARS) 1-0.2-0.2 % eye drops 1 drop  1 drop Both Eyes q4h while awake Simon Cano DO   1 drop at 08/30/24 0839    phenoL (SORE THROAT SPRAY) 1.4 % mucosal spray 1 spray  1 spray Mouth/Throat q2h PRN Simon Cano DO        senna (SENOKOT) tablet 2 tablet  2 tablet oral BID Maddy Cope MD   2 tablet at 08/30/24 0838    sildenafiL (pulm.hypertension) (REVATIO) tablet 20 mg  20 mg oral TID Simon Cano DO   20 mg at 08/30/24 0838    simethicone (MYLICON) chewable tablet 80 mg  80 mg oral 4x daily PRN Ritu Peterson DO   80 mg at 08/30/24 0517    sodium chloride (OCEAN) 0.65 % nasal spray 2 spray  2 spray Each Nostril Daily Simon Cano DO   2 spray at 08/30/24 0840    white petrolatum (AQUAPHOR) ointment   Topical q4h PRN Simon Cano DO   Given at 08/13/24 2117    white petrolatum (AQUAPHOR) ointment   Topical BID Simon Cano DO   Given at 08/30/24 0839    white petrolatum-mineral oil (ARTIFICIAL TEARS OINT) ophthalmic ointment   Both Eyes Nightly Simon Cano DO   Given at 08/29/24 2059        Review of Systems:   All other systems reviewed and are negative except as per HPI.    Physical Exam:     Wt Readings from Last 10 Encounters:   08/30/24 54.5 kg (120 lb 0.7 oz)   08/05/24 49.4 kg (109 lb)   08/05/24 49.4 kg (109 lb)   07/20/24 49.5 kg (109 lb 2 oz)   07/19/24 51.3 kg (113 lb)   06/20/24 51.7 kg (114 lb)   06/14/24 52.6 kg (116 lb)   05/23/24 52.4 kg (115 lb 9.6 oz)   04/17/24 53.5 kg (118 lb)   04/10/24 44.5 kg (98 lb)       BP Readings from Last 5 Encounters:   08/30/24 (!) 118/59   08/05/24 (!) 148/75   07/25/24 (!) 155/77   07/23/24 (!) 101/57   07/19/24 (!) 154/80       Vitals:    08/30/24 0843   BP: (!) 118/59   Pulse: 91   Resp:    Temp: 36.3 °C (97.4 °F)   SpO2: 97%       Constitutional: NAD, AAOx3  HENT: Normocephalic, atraumatic head, sclerae anicteric, no cervical lymphadenopathy, trachea midline, facial swelling  Cardiovascular: RRR, no murmurs, rubs or gallops, JVP: 13 cm H2O   cm H2O, no carotid bruits.  Respiratory: CTA bilateral lung fields, no wheezes, rales or rhonchi  GI: soft, non-tender/non-distended  Musculoskeletal / Extremities: +2 pulses bilateral radial, DP/PT arteries, skin tightening on face and fingertips, ulceration and discoloration of 2nd phalange b/l LE.   Skin: no rashes. Facial changes c/w scleroderma  Neuro / Psych: no focal deficits, CNII-XII grossly intact, appropriate, cooperative    Diagnostic Data:   Results from last 7 days   Lab Units 08/29/24  1126 08/26/24  1211 08/25/24  1127   WBC K/uL 8.54 9.87 8.43   HEMOGLOBIN g/dL 9.1* 8.8* 6.8*   HEMATOCRIT % 30.2* 29.6* 23.3*   MCV fL 86.8 86.5 83.8   PLATELETS K/uL 284 269 265     Results from last 7 days   Lab Units 08/29/24  1126 08/26/24  1211 08/25/24  1127   SODIUM mEQ/L 142 140 139   POTASSIUM mEQ/L 4.2 4.8 4.7   CHLORIDE mEQ/L 109* 108* 108*   CO2 mEQ/L 24 24 27   BUN mg/dL 28* 27* 34*   CREATININE mg/dL 1.3* 1.3* 1.5*   CALCIUM mg/dL 9.0 9.0 8.7   GLUCOSE mg/dL 125* 83 109*   MAGNESIUM mg/dL 1.6*  2.0 2.1       Component      Latest Ref Rng 7/20/2024 7/25/2024   Sodium      136 - 145 mEQ/L 142  139    Potassium, Bld      3.5 - 5.1 mEQ/L 4.2  4.3    Chloride      98 - 107 mEQ/L 107  103    CO2      21 - 31 mEQ/L 25  27    BUN      7 - 25 mg/dL 17  17    Creatinine      0.6 - 1.2 mg/dL 1.2  1.1    Glucose      70 - 99 mg/dL 85  86    Calcium      8.6 - 10.3 mg/dL 9.3  10.6 (H)    AST (SGOT)      13 - 39 IU/L  15    ALT (SGPT)      7 - 52 IU/L  8    Alkaline Phosphatase      34 - 125 IU/L  82    Total Protein      6.0 - 8.2 g/dL  8.8 (H)    Albumin      3.5 - 5.7 g/dL  4.3    Bilirubin, Total      0.3 - 1.2 mg/dL  0.4    eGFR      >=60.0 mL/min/1.73m*2 51.0 (L)  56.6 (L)    Anion Gap      3 - 15 mEQ/L 10  9       Component      Latest Ref Rn 7/20/2024 7/25/2024   WBC      3.80 - 10.50 K/uL 7.27  10.29    Hemoglobin      11.8 - 15.7 g/dL 9.8 (L)  11.2 (L)    Hematocrit      35.0 - 45.0 % 32.0 (L)  37.3    MCV      83.0 - 98.0 fL 80.4 (L)  80.4 (L)    Platelets      150 - 369 K/uL 206  331       Component      Latest Ref Rn 7/19/2024 7/25/2024 8/9/2024   High Sens Troponin I      <15.0 pg/mL 89.2 (HH)  66.8 (HH)  92.5 (HH)    High Sens Troponin I       90.3 (HH)  83.4 (HH)         Component      Latest Ref Rng 5/22/2024 6/22/2024 7/19/2024 7/25/2024   BNP      <=100 pg/mL 352 (H)  150 (H)  151 (H)  220 (H)       Component      Latest Ref Rng 4/16/2024 5/23/2024 7/21/2024 7/22/2024   Sed Rate      0 - 30 mm/hr 101 (H)  79 (H)  105 (H)  >119 (H)       Component      Latest Ref Rng 4/18/2024 5/24/2024   WBC      3.80 - 10.50 K/uL 8.40  16.22 (H)    RBC      3.93 - 5.22 M/uL 3.54 (L)  3.82 (L)    Hemoglobin      11.8 - 15.7 g/dL 9.8 (L)  10.3 (L)    Hematocrit      35.0 - 45.0 % 32.8 (L)  33.9 (L)    Platelets      150 - 369 K/uL 254  289       Component      Latest Ref Rng 5/24/2024   Magnesium      1.8 - 2.5 mg/dL 2.1      Component      Latest Ref Rng 4/13/2023 3/7/2024 5/22/2024   TSH      0.34 - 5.60 mIU/L 4.71   0.28 (L)  1.03      Component      Latest Ref Rng 5/24/2024   Sodium      136 - 145 mEQ/L 138    Potassium, Bld      3.5 - 5.1 mEQ/L 4.4    Chloride      98 - 107 mEQ/L 104    CO2      21 - 31 mEQ/L 24    BUN      7 - 25 mg/dL 40 (H)    Creatinine      0.6 - 1.2 mg/dL 1.1    Glucose      70 - 99 mg/dL 87    Calcium      8.6 - 10.3 mg/dL 9.5    eGFR      >=60.0 mL/min/1.73m*2 56.6 (L)    Anion Gap      3 - 15 mEQ/L 10      Component      Latest Ref Rng 4/13/2023   Hemoglobin A1C      <5.7 % 4.4        Component      Latest Ref Rng 4/14/2023   Triglycerides      30 - 149 mg/dL 44    Cholesterol      <=200 mg/dL 194    HDL      >=55 mg/dL 49 (L)    LDL Calculated      <=100 mg/dL 136 (H)    Non-HDL, Calculated      mg/dL 145    RISK      <=5.0  4.0       Component      Latest Ref Rng 4/13/2023   High Sens Troponin I      <15.0 pg/mL 23.0 (H)       Component      Latest Ref Rng 4/14/2023   Ferritin      11 - 250 ng/mL 75      Component      Latest Ref Rng 4/14/2023   Iron      35 - 150 ug/dL 29 (L)    TIBC      270 - 460 ug/dL 245 (L)    UIBC      180 - 360 ug/dL 216    Iron Saturation      15 - 45 % 12 (L)        Component      Latest Ref Rng 6/27/2022 4/13/2023   BNP      <=100 pg/mL 111 (H)  105 (H)                          ---    ECG (8/29/2024, personally reviewed):   Sinus Rhythm   Lateral T-wave abnormality   HR: 81 bpm     ---    Lower extremity arterial duplex and ENRRIQUE August 10, 2024:  Right ankle ENRRIQUE (DP): 0.74.  Right ankle ENRRIQUE (PT): 0.88.  Left ankle ENRRIQUE (DP): 0.90.  Left ankle ENRRIQUE (PT): 0.62.    Findings concerning for hemodynamically significant stenosis within the  bilateral popliteal arteries and distal left femoral artery. There are also  findings which can be seen with inflow stenosis at the level left common femoral  artery. CTA of the abdomen and pelvis with lower extremity runoff could be  performed for further evaluation if clinically indicated.    CT Chest / Abdomen / Pelvis w/ Contrast  (7/25/2024, personally reviewed):     Lung bases: Left-sided pleural effusion, left parenchymal consolidation, and  findings of interstitial lung disease.  More nodular density in the medial  aspect of the left lower lobe measuring 1.6 x 1.9 cm.  Cardiomegaly with a  pericardial effusion.     Liver: The liver is normal in size.  There is no focal mass.  Hepatic veins and  portal veins are patent without focal filling defects to suggest thrombosis..     Gallbladder:  Small dependent gallstones.  No gallbladder wall thickening..     Spleen: Spleen is normal in size without focal mass lesion..     Pancreas: The pancreas is normal without focal mass, parenchymal atrophy, or  pancreatic duct dilation..     Adrenals: The adrenals are normal bilaterally without focal mass..     Kidneys, ureters and bladder:  Symmetric enhancement and excretion..  Left lower  pole cystic lesion measures 3.2 cm in maximal dimension.  This measures greater  than simple fluid density.  This measured 60 Hounsfield units on the noncontrast  CT from 2020.  It measures 36 Hounsfield units on today's study.  Therefore it  is most in keeping with a hemorrhagic/proteinaceous cyst.  Additional  hypodensities too small to characterize.     Retroperitoneal structures: There is no abdominal aortic aneurysm.  Atherosclerotic calcification.  There is no retroperitoneal adenopathy or  retroperitoneal mass..     Bowel and mesentery: No evidence of a focal inflammatory or obstructive process.  Moderate fecal retention throughout the colon.  There are a few fluid-filled  small bowel loops in the pelvis, nonspecific.     Lymph nodes: No pathologic lymphadenopathy..     Pelvis: The uterus is normal in size.  The ovaries are normal.  There is no  adnexal mass or pelvic free fluid.     Bones: Within normal limits.    No evidence for pulmonary embolism.  Persistent left-sided pleural effusion and airspace disease at the left lung  base.  Underlying interstitial  lung disease.     TTE (7/19/2024, personally reviewed):   Mild increased wall thickness with normal left ventricular cavity and preserved systolic function. EF 65%. No wall motion abnormalities.   Normal right ventricular size with preserved systolic function.   IVC normal in size with >50% respiratory variation consistent with normal right sided filling pressures.   Small pericardial effusion. No evidence of hemodynamic compromise with normal respiratory variation and no chamber collapse. Prominent epicardial fat.   Compared with previous study of 5/23/2024, small pericardial effusion persists.      PET/CT Skull-to-Thigh (6/4/2024):   An approximately 1.6 cm pulmonary nodule at the medial left lung base is  significantly FDG avid at max SUV 8.5.  This is nonspecific and can be seen in  the setting of benign pathology however malignancy is a concern.     There is more mild to moderate nonspecific uptake localizing to lita-fissural  nodules particularly on the left.     There is  moderate nonspecific left hilar and mediastinal dawson uptake.     No additional suspicious FDG avid findings appreciated.     CTA Chest (5/21/2024):   IMPRESSION:  1.  No evidence of acute pulmonary embolism.  2.  Multiple new perifissural left lower lobe nodules, suspicious for a  neoplastic process. PET/CT and or tissue sampling is recommended.  3.  Chronic interstitial lung disease in an NSIP pattern, with overall slightly  progressed appearance since March 2023. Pulmonary consultation is recommended.  4.  Stable cardiomegaly and large pericardial effusion.  5.  Mediastinal and supraclavicular adenopathy, similar to prior study, also  indeterminate, which could be further evaluated at time of PET/CT.     CTA Chest (4/16/2024):   IMPRESSION:  1. No evidence of pulmonary embolism.  2. Stable cardiomegaly and large pericardial effusion. Cardiology consultation  suggested.  3. Emphysema with bilateral lower lobe groundglass opacity and  bronchiectasis in  an NSIP pattern, which can be seen with scleroderma. Pulmonary consultation  suggested.  4. Stable 16 mm left lower lobe masslike density which may represent  atelectasis, lung cancer or lymphoma. When clinically appropriate PET/CT  suggested.  5. Stable prominence of the main pulmonary artery which can be seen with  pulmonary hypertension     TTE (5/23/2024, personally reviewed):  Mild increased wall thickness with normal left ventricular cavity and preserved systolic function. EF 65%. No wall motion abnormalities.   Normal right ventricular size with preserved systolic function.   Tiny IVC with >50% respiratory variation consistent with low-normal right sided filling pressures.   Small pericardial effusion, predominantly adjacent to right atrium. No evidence of hemodynamic compromise with normal respiratory variation and no chamber collapse. Prominent epicardial fat.   Compared with previous study of 4/10/24, small pericardial effusion persists.      TTE (4/10/2024, personally reviewed):  1. Normal left ventricular cavity size with mild concentric left ventricular hypertrophy. Normal systolic function. EF: 60%. No regional wall motion abnormalities. Cannot determine diastolic function. Global longitudinal strain not reported due to technical limitations of study.   2. Aortic valve has three cusps. Trace aortic regurgitation. Aortic valve and root sclerosis.  3. Thickened mitral valve leaflets. Trace mitral valve regurgitation. Normal left atrial size.   4. Normal right ventricular structure and function. Trace tricuspid valve regurgitation with estimated RVSP: 51 mmHg (assuming right atrial pressure of 3 mmHg). Normal right atrial size. Pulmonic valve structurally normal. Trace pulmonic valve regurgitation. Pulsed wave Doppler of the RVOT systolic profile shows decreased acceleration times and geometry consistent with mildly elevated PVR.  5. IVC size is normal with > 50% inspiratory collapse  consistent with normal right atrial pressure. Small-to-moderate sized, circumferential pericardial effusion. No echocardiographic evidence for cardiac tamponade.   6. Compared to previous study from 3/4/2024, no significant change. Pericardial effusion size is similar, estimated right atrial pressure lower.           CTA Abd / Pelvis (3/13/2024, personally reviewed):   There is dilatation of the of proximal/mid small bowel with relatively abrupt  transition left hemiabdomen, likely obstruction. Questionable lesion at the  transition point. This could be better evaluated with CT or MRI enterography.     Aorta and major branches patent. Superior mesenteric artery patent without  significant stenosis.  Suspect moderate stenosis of the origins of the of renal arteries bilaterally.     Large pericardial effusion, no change.     Patchy opacities lower lungs, similar to prior.      Coronary CTA (3/11/2024):   1. Two-vessel coronary artery disease as above that is moderate in severity.  CT  FFR is normal..  2. There is mitral annular calcification and aortic sclerosis.  The right atrium  is enlarged.  There is left ventricular hypertrophy.  There is a moderate to  large circumferential pericardial effusion.  3. Normal left ventricular wall motion and systolic function.  4. Coronary calcium score 313, 96th percentile     TTE (3/4/2024, personally reviewed):   Normal-sized left ventricle. Mild left ventricular hypertrophy. Preserved systolic function. LVEF 60%. No regional wall motion abnormalities. Grade II diastolic dysfunction.  Normal global longitudinal strain (-20%).  The aortic valve is not well seen.  Cannot determine the number of cusps.  Sclerotic leaflets.  No aortic stenosis.  Trace aortic insufficiency.  Normal sized aortic root.  The visualized portion of the ascending aorta is normal in size.  The mitral valve leaflets are thickened. Mild mitral annular calcification. Trace mitral regurgitation.   Mildly  dilated left atrium.  Normal-sized right ventricle. Normal right ventricular systolic function.  Thickened tricuspid valve. There is mild tricuspid regurgitation with estimated RVSP of 46 mmHg.   Pulmonic valve is not well visualized. Trace pulmonic regurgitation.   Mildly dilated right atrium.  IVC is enlarged with <50% respiratory variation consistent with elevated right atrial filling pressure (at least 15 mmHg).   Moderate, circumferential, circumferential pericardial effusion.  The largest pocket is located inferolaterally.  Elevated right atrial pressure.  No other clear echocardiographic features of increased pericardial pressure.  Correlate clinically.   Compared to previous TTE from 6/21/2023, pericardial effusion now moderate in size.  Right atrial pressure now elevated.         CTA Chest (3/3/2024):   IMPRESSION:  1.  No evidence of acute pulmonary embolism to the level of the segmental  branches.  2.  Moderate to large pericardial effusion measuring higher than simple fluid  density.  3.  Lower lobe lung field predominant interstitial thickening with some relative  subpleural sparing, consistent with a chronic interstitial lung disease,  unchanged from the prior study.  4.  Continued bilateral axillary, mediastinal, and bilateral hilar  lymphadenopathy, not definitely changed from the prior study, possibly related  to the patient's sarcoid.  5.  Emphysema.      CTA Chest PE (10/4/2023):  IMPRESSION:  1. No evidence for filling defect or vessel cutoff to suggest pulmonary  embolism.  Enlargement of the pulmonary arteries compatible with pulmonary  arterial hypertension.  2. Changes throughout the lungs as described above which can be seen with  systemic sclerosis/scleroderma.  Underlying pneumonia the lung bases cannot be  entirely excluded in the proper clinical setting.  3.  Additional stable findings as described above.        VQ (9/12/2023):  IMPRESSION:  Intermediate to high probability of pulmonary  embolic disease.     6MWT (7/25/2023, on Sildenafil 20 mg TID):        TTE (6/21/2023, personally reviewed):  1. Normal left ventricular cavity size and mild concentric left ventricular hypertrophy. Normal systolic function. EF: 60%. No regional wall motion abnormalities. Grade I diastolic dysfunction.   2. Aortic valve cusps not well visualized. Trace aortic regurgitation. Aortic valve and root sclerosis.  3. Thickened mitral valve leaflets. Mild mitral annular calcification. Trace mitral regurgitation. Mildly dilated left atrial size.   4. Normal right ventricular structure and function. Mild tricuspid valve regurgitation with estimated RVSP: 55 mmHg (assuming right atrial pressure of 3 mmHg). Normal right atrial size. Pulmonic valve not well visualized. Trace pulmonic valve regurgitation. Pulsed wave Doppler of the RVOT systolic profile show decreased acceleration times ( msec) and late systolic notching consistent with elevated PVR.   5. IVC size is normal with > 50% inspiratory collapse consistent with normal right atrial pressure. Small pericardial effusion without echocardiographic evidence of tamponade.  6. Compared to previous study from 4/14/2023, estimated right ventricular systolic pressure is higher.        TTE (4/14/2023, personally reviewed):   The left ventricular cavity size is normal with mild left ventricular hypertrophy and preserved systolic function. Estimated EF 65%. No regional wall motion abnormalities. Grade I diastolic dysfunction.     The aortic valve is tricuspid. Aortic valve sclerosis. Trace aortic regurgitation.      The visualized portions of the aortic root and ascending aorta are normal in size.     The mitral valve is mildly thickened. Mild mitral annular calcification. Trace mitral regurgitation.       The left atrium is severely dilated.      The right ventricle is normal in size with preserved systolic function     The pulmonic valve is not well seen.     The tricuspid  valve is normal in appearance. mild tricuspid regurgitation with an estimated RVSP of 34 mmHg.       Right atrium is normal in size.     The IVC is small and collapses > 50% with inspiration consistent with normal right atrial pressures.     Small pericardial effusion, mostly posterior.       Compared to previous echocardiogram from 8/22/2022, there is no significant change        TTE (11/28/2022, personally reviewed):   Normal right- and mildly elevated left-sided filling pressures (RA: 3 mmHg, PCWP: 13 mmHg)  Elevated pulmonary artery pressures (60/22(35 mmHg)) in the setting of PCWP: 13 mmHg.   Normal cardiac output and index (CO: 5.1 L/min, CI: 3.2 L/min/m2)  PVR: 4.3 Wood units. SVR: 1840 dynes/sec/cm5      TTE (8/22/2022, personally reviewed):  Normal-sized LV. Normal LV systolic function. Estimated EF 55- 60%. Wall motion appears grossly normal. Mild concentric left ventricular hypertrophy. Grade I LV diastolic dysfunction.  Pulmonic valve not well visualized. Grossly normal pulmonic valve structure. Trace pulmonic valve regurgitation. No pulmonic valve stenosis.  Aortic valve not well visualized. Aortic valve not well seen but likely trileaflet. Focal aortic valve calcification present. Sclerotic aortic valve leaflets. Mild aortic valve regurgitation. No aortic valve stenosis.  RV not well visualized. Normal-sized RV. Normal RV systolic function.  Normal-sized RA.  There is a small loculated pericardial effusion anterior to the right atrium. No cardiac tamponade.  Sclerotic mitral valve. Mitral valve calcification of the anterior leaflet present.  Trace mitral valve regurgitation.  No significant mitral valve stenosis.  Normal-sized IVC. IVC demonstrates normal respiratory collapse.  Tricuspid valve structure is grossly normal. Mild to moderate tricuspid valve regurgitation.  Estimated RVSP = 50-55 mmHg.  Mildly dilated LA.  No previous echocardiogram available for comparison.     TTE (4/21/2022, outside  study):  This result has an attachment that is not available.     A complete transthoracic echocardiogram (including 2D, color flow   Doppler, spectral Doppler and M-mode imaging) was performed using the   standard protocol. The study quality was adequate.     The left ventricle is normal in size. There is moderately increased   wall thickness consistent with moderate concentric hypertrophy.   Endocardium is incompletely visualized, but no regional wall motion   abnormalities are noted in the visualized segments. Normal left   ventricular ejection fraction. The left ventricular ejection fraction by   visual estimate is 55% to 60%.     The right ventricle is normal in size. There is normal function of the   right ventricle.     The left atrium is normal in size. Left atrial volume is 58 mL.     The right atrial volume measures 52 mL. The right atrial volume index   measures 34 mL/m2.     There is trace mitral regurgitation. There is no mitral stenosis.     The aortic valve is trileaflet. There is mild aortic valve thickening.   There is no aortic stenosis. There is trace aortic regurgitation.     There is mild to moderate tricuspid regurgitation. Pulmonary artery   systolic pressure measures 52 mmHg. The pulmonary artery systolic pressure   is moderately elevated.     The aorta measures 3.2 cm at the sinus of Valsalva. The proximal   segment of the ascending aorta is mildly enlarged. The proximal segment of   the ascending aorta measures 3.4 cm. The proximal segment of the ascending   aorta measures 2.2 cm/m2, indexed for body surface area.     Trivial pericardial effusion present. There is no echocardiographic   evidence of cardiac tamponade.     The inferior vena cava is normal in size (diameter <21 mm) and   decreases >50% in size with inspiration, suggesting a normal right atrial   pressure of 3 mmHg (range 0-5 mm Hg).     Compared to the prior study of 3/13/2020, there are no significant   changes.        PFT  (3/23/2022):      PFT (7/14/2021):      CT Chest (5/2/2021, outside study):  CLINICAL INFORMATION: Systemic sclerosis. Interstitial lung disease. Groundglass nodule     PROCEDURE: Unenhanced CT of the chest was performed using thin section reconstructions. Images were obtained on inspiration and expiration.     COMPARISON: June and August 2020     FINDINGS:     LUNGS, PLEURA:     Groundglass opacities with reticulation with subpleural sparing in the lower lobes, without honeycombing or architectural distortion, unchanged.     Right upper lobe groundglass nodule, image 114 of series 3, 8 x 11 mm, previously 6 mm.     Expiratory images are of diagnostic quality and do not demonstrate evidence of clinically significant air trapping.     CARDIOVASCULAR, MEDIASTINUM, THYROID: Coronary calcifications. Trace pericardial effusion.     LYMPH NODES: Subcentimeter mediastinal lymph nodes, unchanged. Unchanged axillary lymph nodes.     SKELETON, CHEST WALL: No destructive bone lesion     UPPER ABDOMEN: Patulous esophagus. 3 mm right renal stone.       RHC (9/02/2020):  RA   8 mmHg   RV   40/5 mmHg   PA (mean)  44/16 (25) mmHg   PCWP   12 mmHg   CO   4.65 lpm (Thermodilution)   CI   2.65 lpm/m-squared   SVI    33 ml/beat/m-squared (lower limit normal 29)   PVR   2.8 mmHg/l/min (Wood units)   SVR   2064 dyne-sec-cm-5         PFT (3/18/2020):      TTE (3/13/2020, outside study):  · The study quality was good.   · The left ventricle is normal in size. Top normal left ventricular wall   thickness. There are no segmental wall motion abnormalities. The left   ventricular ejection fraction is 55-60% by visual estimate and 3D   echocardiography. Normal left ventricular ejection fraction.   · There is grade I (mild) LV diastolic dysfunction.   · The right ventricle is normal in size. There is normal function of the   right ventricle.   · The right atrium is mildly dilated.   · There is mild mitral valve anterior leaflet thickening. There  is mild   mitral valve leaflet calcification. There is flattening of the mitral   leaflets without raphael prolapse. There is trace mitral regurgitation.   · The aortic valve is trileaflet. There is mild thickening of the aortic   valve. There is mild calcification of the aortic valve. There is no aortic   /stenosis. There is trace aortic regurgitation.   · There is mild tricuspid valve leaflet thickening. There is mild to   moderate tricuspid regurgitation. The pulmonary artery systolic pressure   is moderately elevated. Pulmonary artery systolic pressure measures 50   mmHg. There is mid-systolic notching of the RVOT VTI consistent with   elevated pulmonary vascular resistance.   · The inferior vena cava is normal in size (diameter <21 mm) and decreases   >50% in size with inspiration, suggesting a normal right atrial pressure   of 3 mmHg (range 0-5 mm Hg).   · Trivial loculated pericardial effusion present adjacent to the right   ventricle and adjacent to the right atrium.          Assessment / Plan:     1. Chest Pain:  - Currently resolved  - Given pattern and character, I believe this has musculoskeletal / serous pain from her autoimmune disease. There may be an element of myopericarditis.  - She reports that pain has not responded to increased in steroids in the past   - Non-ACS chest pain and known non-obstructive CAD as recently as March 2024 CCTA.     2. Pulmonary Hypertension (WHO Group I, NYHA/WHO FC III):   - Occurring in the background of Systemic Scleroderma with ILD. August 2022 TTE showed normal RV size and function, but progressive exertional decline, worsened DLCO and resting tachycardia suggest there may be progression of her pulmonary vascular disease and limitation of cardiac output. This was proven with 11/2022 RHC showing mean PA 35 mmHg (with PCWP 13 mmHg) and PVR 4.3 Wood units. Recent TTEs have shown appropriate RV:PA coupling with normal RV size and systolic function.   - She is hypervolemic  on exam with little change since 8/29  - Urine output fair  -Standing weight is similar today but performed on a different scale  -Would attempt Metolazone 2.5 mg PO x1 followed by PO Ethacrynic Acid 200 mg x1 this AM and evaluate response for additional dosing(s) later today. Continue daily standing weights.  I's and O's. Dry weight in mid-to-low 110s.  - Needs labs for electrolytes and renal function today  - Continue Sildenafil 20 mg TID and uninterrupted Macitentan 10 mg daily    3. HTN:   - Continue Amlodipine.     4. Systemic Scleroderma / Raynaud's Disease:   - Per Rheumatology (Dr. Trevizo).      5. ILD:   - Per Pulmonology and Rheumatology (Dr. Trevizo). She has not tolerated trials of Mycophenolate. She has been receiving Tocilizumab and got dose yesterday.    6. PE:   - She has had elevated probability on V/Q scans, but CTA Chest has consistently not shown evidence for acute or chronic thromboembolism. Apixaban has been discontinued.     7. CAD:   - Two-vessel non-obstructive disease on March 2024 CCTA. She is continued on Atorvastatin 40 mg daily.     8. PAD with Toe Ulcerations  -S/P left PTA dSFA/TP and DCB to popliteal arteries 8/14/24. Followed by vascular surgery.   -Hematoma noted with drop in hgb - clopidogrel was held.  -She received 1 unit of blood with improvement in hgb.   -continue Clopidogrel and Atorvastatin.     9. Lung Mass  - Pathology from 8/8 biopsy with atypical glandular cells, possibly consistent with adenocarcinoma  - Management per pulmonology, outpatient follow up.     Timothy Arvizu DO  8/30/2024

## 2024-08-30 NOTE — PLAN OF CARE
Care Coordination Discharge Plan Note     Discharge Needs Assessment  Concerns to be Addressed: discharge planning  Current Discharge Risk: chronically ill, physical impairment    Anticipated Discharge Plan  Anticipated Discharge Disposition: home with assistance, home with home health  Type of Home Care Services: nursing, home PT, home OT, home health aide, other (comments) (NP palliative care)      Patient Choice  Offered/Gave Vendor List: yes  Patient's Choice of Community Agency(s): [facility]    Patient and/or patient guardian/advocate was made aware of their right to choose a provider. A list of eligible providers was presented and reviewed with the patient and/or patient guardian/advocate in written and/or verbal form. The list delineates providers in the patient’s desired geographic area who are participating in the Medicare program and/or providers contracted with the patient’s primary insurance. The Medicare list and quality ratings were obtained from the Medicare.gov [medicare.gov] website.    ---------------------------------------------------------------------------------------------------------------------    Interdisciplinary Discharge Plan Review:  Participants:     Concerns Comments: Per info in medical rounds today, pt is not stable for d/c. ENRIQUE next week. Per medical team- continue diuresis. Anticipate d/c home with [facility] when stable for d/c.    Discharge Plan:   Disposition/Destination: Home Health Care - Other / Home  Discharge Facility:    Community Resources:      Discharge Transportation:  Is Out of Hospital DNR needed at Discharge: no  Does patient need discharge transport? No

## 2024-08-30 NOTE — PROGRESS NOTES
Pain management:  Reached out to primary team.  No new concerns addressed.  Palliative care note reviewed.      Please reach out should need arise

## 2024-08-30 NOTE — ACP (ADVANCE CARE PLANNING)
Palliative Care Advance Care Planning Note      Patient Name: Arielle Felix                                                                                        Patient MRN: [id number]  Discussion Date: 08/30/24   Discussion Participants: patient, daughters Tiffanie and Wagner via phone, Dr. Senior and this writer.   Start time: 1:05 pm  End time: 1:35 pm     Today participants wished to discuss Advance Care Planning. The following summarizes our discussion.    During our visit today, we discussed:     This meeting was held for medical information sharing as the patient and her daughters felt that they did not fully understand the extent of the patient's medical condition.  Dr. Senior provided a thorough medical overview to include that Ms. Felix has scleroderma that has affected her other organs resulting in HF, PHTN, poor circulation and pulmonary fibrosis.  She also explained that the lung biopsy shows early signs of lung cancer.      It was explained that the primary focus during this admission is heart failure management.  Once her fluid volume status improves, she will likely be discharged home for pulmonary follow up as an outpt.      The family had the opportunity to ask questions and all questions were answered to the best of our ability.      Goals of care discussions will be ongoing.     Attestation Statement:  I have spent a total of 30 minutes in face-to-face discussion of patient advance care planning with the patient and/or surrogate decision makers.  No active management of the patient’s problem(s) was undertaken during the time period reported      KARYNA Pop  8/30/2024 3:03 PM

## 2024-08-30 NOTE — PLAN OF CARE
Plan of Care Review  Plan of Care Reviewed With: patient  Progress: improving  Outcome Evaluation: Pt AAox4. Small bm after enema. VSS. SR on Tele. Will cont to monitor.

## 2024-08-31 LAB
ANION GAP SERPL CALC-SCNC: 8 MEQ/L (ref 3–15)
ATRIAL RATE: 80
BUN SERPL-MCNC: 35 MG/DL (ref 7–25)
CALCIUM SERPL-MCNC: 9.5 MG/DL (ref 8.6–10.3)
CHLORIDE SERPL-SCNC: 104 MEQ/L (ref 98–107)
CO2 SERPL-SCNC: 28 MEQ/L (ref 21–31)
CREAT SERPL-MCNC: 1.6 MG/DL (ref 0.6–1.2)
EGFRCR SERPLBLD CKD-EPI 2021: 36.1 ML/MIN/1.73M*2
ERYTHROCYTE [DISTWIDTH] IN BLOOD BY AUTOMATED COUNT: 24.1 % (ref 11.7–14.4)
GLUCOSE SERPL-MCNC: 83 MG/DL (ref 70–99)
HCT VFR BLD AUTO: 30.8 % (ref 35–45)
HGB BLD-MCNC: 9.5 G/DL (ref 11.8–15.7)
MAGNESIUM SERPL-MCNC: 2.2 MG/DL (ref 1.8–2.5)
MCH RBC QN AUTO: 26.4 PG (ref 28–33.2)
MCHC RBC AUTO-ENTMCNC: 30.8 G/DL (ref 32.2–35.5)
MCV RBC AUTO: 85.6 FL (ref 83–98)
P AXIS: 45
PDW BLD AUTO: 10.2 FL (ref 9.4–12.3)
PLATELET # BLD AUTO: 320 K/UL (ref 150–369)
POTASSIUM SERPL-SCNC: 4.6 MEQ/L (ref 3.5–5.1)
PR INTERVAL: 148
QRS DURATION: 80
QT INTERVAL: 352
QTC CALCULATION(BAZETT): 405
R AXIS: 1
RBC # BLD AUTO: 3.6 M/UL (ref 3.93–5.22)
SODIUM SERPL-SCNC: 140 MEQ/L (ref 136–145)
T WAVE AXIS: 103
VENTRICULAR RATE: 80
WBC # BLD AUTO: 5.49 K/UL (ref 3.8–10.5)

## 2024-08-31 PROCEDURE — 63600000 HC DRUGS/DETAIL CODE: Mod: JZ | Performed by: NURSE PRACTITIONER

## 2024-08-31 PROCEDURE — 21400000 HC ROOM AND CARE CCU/INTERMEDIATE

## 2024-08-31 PROCEDURE — 63700000 HC SELF-ADMINISTRABLE DRUG: Performed by: INTERNAL MEDICINE

## 2024-08-31 PROCEDURE — 93005 ELECTROCARDIOGRAM TRACING: CPT | Performed by: NURSE PRACTITIONER

## 2024-08-31 PROCEDURE — 83735 ASSAY OF MAGNESIUM: CPT | Performed by: INTERNAL MEDICINE

## 2024-08-31 PROCEDURE — 99233 SBSQ HOSP IP/OBS HIGH 50: CPT | Mod: GC | Performed by: INTERNAL MEDICINE

## 2024-08-31 PROCEDURE — 99233 SBSQ HOSP IP/OBS HIGH 50: CPT | Performed by: INTERNAL MEDICINE

## 2024-08-31 PROCEDURE — 63700000 HC SELF-ADMINISTRABLE DRUG: Performed by: STUDENT IN AN ORGANIZED HEALTH CARE EDUCATION/TRAINING PROGRAM

## 2024-08-31 PROCEDURE — 80048 BASIC METABOLIC PNL TOTAL CA: CPT | Performed by: INTERNAL MEDICINE

## 2024-08-31 PROCEDURE — 93010 ELECTROCARDIOGRAM REPORT: CPT | Performed by: INTERNAL MEDICINE

## 2024-08-31 PROCEDURE — 36415 COLL VENOUS BLD VENIPUNCTURE: CPT | Performed by: INTERNAL MEDICINE

## 2024-08-31 PROCEDURE — 85027 COMPLETE CBC AUTOMATED: CPT | Performed by: INTERNAL MEDICINE

## 2024-08-31 PROCEDURE — 63700000 HC SELF-ADMINISTRABLE DRUG: Performed by: NURSE PRACTITIONER

## 2024-08-31 RX ORDER — ETHACRYNIC ACID 25 MG/1
200 TABLET ORAL ONCE
Status: COMPLETED | OUTPATIENT
Start: 2024-08-31 | End: 2024-08-31

## 2024-08-31 RX ADMIN — METOCLOPRAMIDE 5 MG: 10 TABLET ORAL at 00:47

## 2024-08-31 RX ADMIN — LIDOCAINE 4% 1 PATCH: 40 PATCH TOPICAL at 08:42

## 2024-08-31 RX ADMIN — FAMOTIDINE 20 MG: 20 TABLET, FILM COATED ORAL at 00:53

## 2024-08-31 RX ADMIN — AMLODIPINE BESYLATE 10 MG: 10 TABLET ORAL at 08:40

## 2024-08-31 RX ADMIN — SALINE NASAL SPRAY 2 SPRAY: 1.5 SOLUTION NASAL at 08:41

## 2024-08-31 RX ADMIN — CYANOCOBALAMIN TAB 1000 MCG 1000 MCG: 1000 TAB at 08:40

## 2024-08-31 RX ADMIN — MINERAL OIL, WHITE PETROLATUM: .03; .94 OINTMENT OPHTHALMIC at 22:26

## 2024-08-31 RX ADMIN — GABAPENTIN 200 MG: 100 CAPSULE ORAL at 22:21

## 2024-08-31 RX ADMIN — GUAIFENESIN 600 MG: 600 TABLET ORAL at 08:40

## 2024-08-31 RX ADMIN — DICYCLOMINE HYDROCHLORIDE 10 MG: 10 CAPSULE ORAL at 16:32

## 2024-08-31 RX ADMIN — GLYCERIN 1 DROP: .002; .002; .01 SOLUTION/ DROPS OPHTHALMIC at 22:26

## 2024-08-31 RX ADMIN — GLYCERIN 1 DROP: .002; .002; .01 SOLUTION/ DROPS OPHTHALMIC at 06:04

## 2024-08-31 RX ADMIN — ONDANSETRON 4 MG: 2 INJECTION INTRAMUSCULAR; INTRAVENOUS at 17:16

## 2024-08-31 RX ADMIN — CLOPIDOGREL 75 MG: 75 TABLET ORAL at 08:40

## 2024-08-31 RX ADMIN — SILDENAFIL 20 MG: 20 TABLET, FILM COATED ORAL at 08:40

## 2024-08-31 RX ADMIN — SILDENAFIL 20 MG: 20 TABLET, FILM COATED ORAL at 13:34

## 2024-08-31 RX ADMIN — ACETAMINOPHEN 975 MG: 325 TABLET ORAL at 11:52

## 2024-08-31 RX ADMIN — ONDANSETRON 4 MG: 2 INJECTION INTRAMUSCULAR; INTRAVENOUS at 11:51

## 2024-08-31 RX ADMIN — GLYCERIN 1 DROP: .002; .002; .01 SOLUTION/ DROPS OPHTHALMIC at 13:35

## 2024-08-31 RX ADMIN — SILDENAFIL 20 MG: 20 TABLET, FILM COATED ORAL at 22:21

## 2024-08-31 RX ADMIN — METOLAZONE 2.5 MG: 2.5 TABLET ORAL at 08:40

## 2024-08-31 RX ADMIN — ETHACRYNIC ACID 200 MG: 25 TABLET ORAL at 15:20

## 2024-08-31 RX ADMIN — LEVOTHYROXINE SODIUM 100 MCG: 0.1 TABLET ORAL at 05:59

## 2024-08-31 RX ADMIN — WHITE PETROLATUM: 1.75 OINTMENT TOPICAL at 08:41

## 2024-08-31 RX ADMIN — ACETAMINOPHEN 975 MG: 325 TABLET ORAL at 17:16

## 2024-08-31 RX ADMIN — SENNOSIDES 2 TABLET: 8.6 TABLET, FILM COATED ORAL at 08:40

## 2024-08-31 RX ADMIN — GLYCERIN 1 DROP: .002; .002; .01 SOLUTION/ DROPS OPHTHALMIC at 17:20

## 2024-08-31 RX ADMIN — SENNOSIDES 2 TABLET: 8.6 TABLET, FILM COATED ORAL at 22:21

## 2024-08-31 RX ADMIN — PANTOPRAZOLE SODIUM 40 MG: 40 TABLET, DELAYED RELEASE ORAL at 08:40

## 2024-08-31 RX ADMIN — GLYCERIN 1 DROP: .002; .002; .01 SOLUTION/ DROPS OPHTHALMIC at 08:41

## 2024-08-31 RX ADMIN — GABAPENTIN 200 MG: 100 CAPSULE ORAL at 13:34

## 2024-08-31 RX ADMIN — SIMETHICONE 80 MG: 80 TABLET, CHEWABLE ORAL at 06:00

## 2024-08-31 RX ADMIN — GUAIFENESIN 600 MG: 600 TABLET ORAL at 22:21

## 2024-08-31 RX ADMIN — GABAPENTIN 200 MG: 100 CAPSULE ORAL at 08:40

## 2024-08-31 ASSESSMENT — COGNITIVE AND FUNCTIONAL STATUS - GENERAL
WALKING IN HOSPITAL ROOM: 3 - A LITTLE
STANDING UP FROM CHAIR USING ARMS: 3 - A LITTLE
MOVING TO AND FROM BED TO CHAIR: 3 - A LITTLE
MOVING TO AND FROM BED TO CHAIR: 3 - A LITTLE
CLIMB 3 TO 5 STEPS WITH RAILING: 3 - A LITTLE
CLIMB 3 TO 5 STEPS WITH RAILING: 3 - A LITTLE
WALKING IN HOSPITAL ROOM: 3 - A LITTLE
STANDING UP FROM CHAIR USING ARMS: 3 - A LITTLE

## 2024-08-31 NOTE — PLAN OF CARE
Plan of Care Review  Plan of Care Reviewed With: patient  Progress: no change  Outcome Evaluation: VSS thoughout shift. c/o 10/10 pain unrelieved with tylenol. patient refusing stronger pain medications at this time. difficulty with I&O, patient is incontinent of urine and does not want to have a purewik placed. X1 to bathroom. Tolerating PO intake. call bell within reach. Bed alarm set.

## 2024-08-31 NOTE — PROGRESS NOTES
Hospital Medicine Service -  Daily Progress Note       SUBJECTIVE   Interval History: Patient seen and examined at bedside, she reports abdominal bloatedness is much improved.  She is continued on oral diuresis with ethacrynic acid and metolazone   OBJECTIVE      Vital signs in last 24 hours:  Temp:  [36.1 °C (97 °F)-36.9 °C (98.4 °F)] 36.8 °C (98.2 °F)  Heart Rate:  [] 101  Resp:  [15-18] 16  BP: (122-132)/(56-68) 129/62    Intake/Output Summary (Last 24 hours) at 8/31/2024 1230  Last data filed at 8/31/2024 0200  Gross per 24 hour   Intake 510 ml   Output --   Net 510 ml       PHYSICAL EXAMINATION        Physical Exam  General:chronically ill looking, on 2l n/c  HEENT: Symmetric, PERRL/EOMI, moist membranes, NCAT  Cardiovascular: Regular rate and rhythm, normal S1/S2, no murmurs, rubs, gallops, +JVD  Pulmonary: Clear to auscultation bilaterally, no wheezing, rhonchi  GI: Soft, nontender, nondistended, bowel sounds normal, no organomegaly  Musculoskeletal: Normal bulk and tone, normal ROM  SKIN: scarring, hyper/hypopigmentation on face, scalp, upper and lower extremities, has missing digits in in bilateral hand fingers, and gangrene toes bilateral LE  Extremities: Distal pulses intact, No LE edema bilaterally  Neuro: CN2-12 intact, sensation intact, with no motor deficits  Psych: Sad mood and affect        LINES, CATHETERS, DRAINS, AIRWAYS, AND WOUNDS   Lines, Drains, and Airways:   Peripheral IV (Adult) 08/21/24 Anterior;Upper;Left Arm (Active)   Number of days: 4       Wound Anterior;Right Toe (2nd) (Active)   Number of days: 17       Wound Anterior;Left Toe (Great) (Active)   Number of days: 17       Wound Anterior;Left Toe (2nd) (Active)   Number of days: 17       Wound Arterial Ulcer Anterior;Right Ankle (Active)   Number of days: 5       Wound Arterial Ulcer Anterior;Left Ankle (Active)   Number of days: 5       Wound Anterior;Left Toe (5th) (Active)   Number of days: 5       Surgical Incision Groin  Right (Active)   Number of days: 11       Wounds (agree with documentation and present on admission)     LABS / IMAGING / TELE      Labs  Lab Results   Component Value Date    GLUCOSE 83 08/31/2024    CALCIUM 9.5 08/31/2024     08/31/2024    K 4.6 08/31/2024    CO2 28 08/31/2024     08/31/2024    BUN 35 (H) 08/31/2024    CREATININE 1.6 (H) 08/31/2024       Lab Results   Component Value Date    WBC 5.49 08/31/2024    HGB 9.5 (L) 08/31/2024    HCT 30.8 (L) 08/31/2024    MCV 85.6 08/31/2024     08/31/2024       Imaging  No results found.     Telemetry  Reviewed independently. No events.    ASSESSMENT AND PLAN        # Chronic pain -- Patient reports multiple areas of chronic pain with associated point tenderness on exam. She feels this pain is acutely worsened during this hospitalization. Imaging negative. Likely MSK in etiology. Pain has been very difficult to control due to either lack of improvement, intolerable side effects, or allergies. She has tried several regimens including IV fentanyl without improvement. PC and Pain Management following.  -Has no chest pain today  -Complains severely of bilateral lower extremity pain states left leg pain is worse than right  -Will continue gabapentin   - No reports dizziness with dilaudid  - Will stop for now  - Scheduled outpatient appointment with Pain Management, Dr. Charles on 9/23/2024 at 1:30pm     # Nausea/Vomiting, Ileus, Constipation -- Patient with persistent and worsening nausea/vomiting this week along with progressive constipation. Likely in the setting of heavy opiate use for pain control as above. KUB shows significant stool retention throughout the colon. Non-obstructive bowel gas pattern.  - Scheduled IV zofran  -Added Reglan   - Methylnaltrexone every other day, PC approved  - S/p 1/2 dose bowel prep on 8/23 and again on 8/24 with good response  - Bisacodyl suppository TID, Mineral oil enema BID  - Overall, appears to be slowly  improving     # GEORGE -- Cr up to 1.6 from baseline 1.1-1.2. Possibly prerenal iso emesis.  - Resuming ethacrynic acid for now  -Will give another dose of IV ethacrynic acid today  - Avoid nephrotoxins, Renally dose meds     # Dry gangrene of bilateral toes, PAD -- S/p LLE balloon angiogram with Vascular Surgery on 8/14.  - Local wound care  - Continue clopidogrel daily, She is allergic to aspirin  - Continue statin     # R groin hematoma, Acute blood loss anemia -- Suffered as a complication of LLE angiogram for dry gangrene/PAD as above. No active extravasation on CTA. C/b drop in Hb to ~7. FA/B12 wnl. Ferritin 24, %sat 9.  - Clopidogrel resumed on 8/17  - Hb downtrended to 6.8, given 1unit  - Today 8.8  - If concerns for hemodynamic instability/active bleeding, would recommend repeat abdominal imaging to assess for internal bleed/hematoma enlargement  - IV iron x5 doses     # ILD, Pulm HTN -- Chronic on 2L NC.   - Continue home sildenafil, macitentan  -Evaluated by cardiology today  -Resume ethacrynic acid oral 200 mg and metolazone 2.5 mg      #Scleroderma  - On Tocilizumab as scheduled  - Will get 1 dose today  - Rheumatology consulted       # Pulmonary nodule -- S/p EBUS with path showing small focus of atypical glands. -   - Pulm discussed results as abnormal cells likely due to adenocarcinoma and will require surveillance outpatient.    # Bilateral eye pain -- Seen by Optho, drops recommended.       VTE Assessment: Padua    VTE Prophylaxis:  Current anticoagulants:    None      Code Status: Full Code      Estimated Discharge Date: 9/4/2024   Disposition Planning: D/c home pending diuresis      Ba Figueroa MD  8/31/2024

## 2024-08-31 NOTE — PROGRESS NOTES
Hospital Medicine Service -  Daily Progress Note       SUBJECTIVE   Interval History: Patient seen and examined at bedside, today she is much more teary.    Started on a dose ethacrynic acid 200mg and metolazone    Also had an updated meeting with her 2 daughters over FaceTime, palliative nurse and patient.    I explained patient has been very ill, concern for cancer and this will need to be followed up outpatient by pulmonology.  Emphasized currently treating her for heart failure exacerbation, ILD and tocilizumab for scleroderma.  I also noted she will likely be discharged once she has achieved euvolemic status.   OBJECTIVE      Vital signs in last 24 hours:  Temp:  [36.1 °C (97 °F)-36.9 °C (98.4 °F)] 36.8 °C (98.2 °F)  Heart Rate:  [] 101  Resp:  [15-18] 16  BP: (122-132)/(56-68) 129/62    Intake/Output Summary (Last 24 hours) at 8/31/2024 1225  Last data filed at 8/31/2024 0200  Gross per 24 hour   Intake 510 ml   Output --   Net 510 ml       PHYSICAL EXAMINATION        Physical Exam  General:chronically ill looking, on 2l n/c  HEENT: Symmetric, PERRL/EOMI, moist membranes, NCAT  Cardiovascular: Regular rate and rhythm, normal S1/S2, no murmurs, rubs, gallops, +JVD  Pulmonary: Clear to auscultation bilaterally, no wheezing, rhonchi  GI: Soft, nontender, nondistended, bowel sounds normal, no organomegaly  Musculoskeletal: Normal bulk and tone, normal ROM  SKIN: scarring, hyper/hypopigmentation on face, scalp, upper and lower extremities, has missing digits in in bilateral hand fingers, and gangrene toes bilateral LE  Extremities: Distal pulses intact, No LE edema bilaterally  Neuro: CN2-12 intact, sensation intact, with no motor deficits  Psych: Sad mood and affect        LINES, CATHETERS, DRAINS, AIRWAYS, AND WOUNDS   Lines, Drains, and Airways:   Peripheral IV (Adult) 08/21/24 Anterior;Upper;Left Arm (Active)   Number of days: 4       Wound Anterior;Right Toe (2nd) (Active)   Number of days: 17        Wound Anterior;Left Toe (Great) (Active)   Number of days: 17       Wound Anterior;Left Toe (2nd) (Active)   Number of days: 17       Wound Arterial Ulcer Anterior;Right Ankle (Active)   Number of days: 5       Wound Arterial Ulcer Anterior;Left Ankle (Active)   Number of days: 5       Wound Anterior;Left Toe (5th) (Active)   Number of days: 5       Surgical Incision Groin Right (Active)   Number of days: 11       Wounds (agree with documentation and present on admission)     LABS / IMAGING / TELE      Labs  Lab Results   Component Value Date    GLUCOSE 83 08/31/2024    CALCIUM 9.5 08/31/2024     08/31/2024    K 4.6 08/31/2024    CO2 28 08/31/2024     08/31/2024    BUN 35 (H) 08/31/2024    CREATININE 1.6 (H) 08/31/2024       Lab Results   Component Value Date    WBC 5.49 08/31/2024    HGB 9.5 (L) 08/31/2024    HCT 30.8 (L) 08/31/2024    MCV 85.6 08/31/2024     08/31/2024       Imaging  No results found.     Telemetry  Reviewed independently. No events.    ASSESSMENT AND PLAN        # Chronic pain -- Patient reports multiple areas of chronic pain with associated point tenderness on exam. She feels this pain is acutely worsened during this hospitalization. Imaging negative. Likely MSK in etiology. Pain has been very difficult to control due to either lack of improvement, intolerable side effects, or allergies. She has tried several regimens including IV fentanyl without improvement. PC and Pain Management following.  -Has no chest pain today  -Complains severely of bilateral lower extremity pain states left leg pain is worse than right  -Will continue gabapentin   - No reports dizziness with dilaudid  - Will stop for now  - Scheduled outpatient appointment with Pain Management, Dr. Charles on 9/23/2024 at 1:30pm     # Nausea/Vomiting, Ileus, Constipation -- Patient with persistent and worsening nausea/vomiting this week along with progressive constipation. Likely in the setting of heavy opiate  use for pain control as above. KUB shows significant stool retention throughout the colon. Non-obstructive bowel gas pattern.  - Scheduled IV zofran  -Added Reglan   - Methylnaltrexone every other day, PC approved  - S/p 1/2 dose bowel prep on 8/23 and again on 8/24 with good response  - Bisacodyl suppository TID, Mineral oil enema BID  - Overall, appears to be slowly improving     # GEORGE -- Cr up to 1.6 from baseline 1.1-1.2. Possibly prerenal iso emesis.  - Resuming ethacrynic acid for now  -Will give another dose of IV ethacrynic acid today  - Avoid nephrotoxins, Renally dose meds     # Dry gangrene of bilateral toes, PAD -- S/p LLE balloon angiogram with Vascular Surgery on 8/14.  - Local wound care  - Continue clopidogrel daily, She is allergic to aspirin  - Continue statin     # R groin hematoma, Acute blood loss anemia -- Suffered as a complication of LLE angiogram for dry gangrene/PAD as above. No active extravasation on CTA. C/b drop in Hb to ~7. FA/B12 wnl. Ferritin 24, %sat 9.  - Clopidogrel resumed on 8/17  - Hb downtrended to 6.8, given 1unit  - Today 8.8  - If concerns for hemodynamic instability/active bleeding, would recommend repeat abdominal imaging to assess for internal bleed/hematoma enlargement  - IV iron x5 doses     # ILD, Pulm HTN -- Chronic on 2L NC.   - Continue home sildenafil, macitentan  -Evaluated by cardiology today  -Resume ethacrynic acid oral 200 mg and metolazone 2.5 mg      #Scleroderma  - On Tocilizumab as scheduled  - Will get 1 dose today  - Rheumatology consulted       # Pulmonary nodule -- S/p EBUS with path showing small focus of atypical glands. -   - Pulm discussed results as abnormal cells likely due to adenocarcinoma and will require surveillance outpatient.    # Bilateral eye pain -- Seen by Optho, drops recommended.       VTE Assessment: Padua    VTE Prophylaxis:  Current anticoagulants:    None      Code Status: Full Code      Estimated Discharge Date: 9/4/2024    Disposition Planning: D/c home pending diuresis      Ba Figueroa MD  8/31/2024

## 2024-08-31 NOTE — PROGRESS NOTES
Cardiology - Progress Note       Interval History:  Patient overall continues to relay some abdominal discomfort and fullness.  He states that breathing is not quite back to baseline yet.  Weight is down to 118 pounds still slightly above known dry weight.  No other acute events overnight    Consult background from 8/9  Ms. Felix is a 63 y.o. female with a past medical history of Pulmonary HTN, HTN, Raynaud's Disease, Cutaneous Systemic Scleroderma (dx 1998), ILD, PE (3/2023). She is a patient of Dr. Nguyễn. She was previously followed by Dr. Jos Valdez at [facility]. Echocardiogram from 4/21/2022 showed LVEF: 55-60%, mild aortic valve thickening, pulmonary artery systolic pressure: 52 mmHg and trivial pericardial effusion. LHC and RHC (separate occasions) over the last 5-10 years which showed normal hemodynamics and normal coronaries. She had a RHC 9/02/2020 showing top-normal right sided filling pressures with mild pulmonary hypertension, top-normal PVR and normal pulmonary capillary wedge pressure. The cardiac index was normal, seen below.     On 6/27/2022, she was in [facility] for chest pain. EKG: NSR without ischemic changes. hsTrop: 12->16, d-dimer: 0.56, CXR showed cardiomegaly and diffuse interstitial prominence.     Dr. Nguyễn ordered echocardiogram, which was completed on 8/22/2022 and showed estimated RVSP = 50-55 mmHg, normal-sized LV with normal LV systolic function. Estimated EF 55- 60%. Wall motion grossly normal, mild concentric left ventricular hypertrophy, grade I LV diastolic dysfunction, probably normal RV size and function.    At her initial visit on 10/11/2022, she reported that she felt very short of breath with minimal activity most of the time. She reports that this started about 1 year prior and had progressively gotten worse. She described PND and bendopnea. She reported that she experienced ankle swelling, worsened over the past year and usually worse towards the end of the night. She  also reported some swelling in her abdomen. She reported daily palpations. I recommended a RHC which was done 11/28/2022 and showed: Normal right sided filling pressures. Mildly elevated left sided filling pressures. Precapillary pulmonary hypertension with mean PA 36 mmHg.    She was hospitalized on 3/19/2023 at [facility] for PE diagnosed on V/Q scan. She reports that she had presented with left arm pain and heaviness. She states that she was started on Apixaban. One week after her last office visit (4/6/2023), she presented to [facility] [place] with chest pain, epistaxis and hemoptysis. Echocardiogram showed: left ventricular cavity size normal with mild left ventricular hypertrophy and preserved systolic function. Estimated EF 65%. Chest CTA showed no evidence of PE; Apixaban was discontinued.     She had a repeat echocardiogram (6/2023) which showed: Normal left ventricular cavity size and mild concentric left ventricular hypertrophy. Normal systolic function. EF: 60%. Normal right ventricular structure and function. Mild tricuspid valve regurgitation with estimated RVSP: 55 mmHg. Compared to previous study from 4/14/2023, estimated right ventricular systolic pressure were higher.    She underwent V/Q scan in September 2023, which showed intermediate-to-high probability of pulmonary embolic disease, as a result she completed CTA Chest PE which showed: no evidence for filling defect or vessel cutoff to suggest pulmonary embolism. Enlargement of the pulmonary arteries compatible with pulmonary arterial hypertension was seen.    She reports she started Macitentan around August or September 2023. She stated that she had not been checking her SpO2. She reports she did not notice any difference since starting it. She reported she had been feeling more shortness of breath at times with ADLs.     She presented to [facility] on 3/3/2024 with chest pressure, palpitations and nausea. She was noted to have hypoxia and tachypnea  at presentation. She was found to have moderate-to-large pericardial effusion without tamponade. She was started on Colchicine 0.6 mg BID on 3/4/2024 and when she did not improve symptomatically, Prednisone 20 mg daily was added on 3/5/2024. On 3/7/2024, she developed multiple episodes of diarrhea and Colchicine dose was decreased to 0.3 mg. Coronary CTA, done as part of the ischemic workup, showed moderate 2 vessel CAD. She was recommended to begin Atorvastatin 20 mg daily and Clopidogrel 75 mg daily. She initially refused those two medications, but eventually agreed to take Atorvastatin.      She was discharged on Colchicine 0.3 mg for at least 3 months and Prednisone 20 mg for a total two week course until 3/19/2024, then decrease the dose to 10 mg daily for 2 weeks until 4/2/2024, then decrease dose to 5 mg daily for 2 weeks until 4/16/2024. She reports she tapered off Prednisone as instructed and has continued to take Colchicine as prescribed. She completed an echocardiogram (4/10/2024) which showed: Small-to-moderate sized, circumferential pericardial effusion. No echocardiographic evidence for cardiac tamponade. Compared to previous study from 3/4/2024, no significant change. Pericardial effusion size was similar, estimated right atrial pressure lower.      She presented to [facility] (4/16/2024) with increased pleuritic chest pain. On presentation to the ER, she was noted to be hypertensive and tachycardic. Labs showed mildly elevated troponin, elevated BNP and leukocytosis. CT angiography of the chest did not show evidence of PE. It was read as large pericardial effusion, evidence of emphysema, evidence of pulmonary hypertension, 16 mm left lobe lung mass.       She presented again to [facility] on 5/21/2024 at the request of her Rheumatologist. During her visit there, she was noted to be tachycardic and with some shortness of breath. Her chest pain was improved from her prior admission. On arrival, O2 saturations were  "normal. ECG showed: Sinus Tachycardia (HR: 125 bpm). BNP >300, hsTrop 32 ->24. POCUS in the emergency department showed evidence of pericardial effusion with no tamponade physiology. CTA Chest did not show evidence of pulmonary embolism. There were bilateral changes radiographically concerning for volume overload. She received Methylpredinsolone 125 mg in ED. Of note, she was on Prednisone 10 mg daily at home. She was given IV diuresis. She was discharged on Furosemide 20 mg PO PRN daily for fluid weight gain / swelling.     She had brief hospitalization and was discharged on 7/23 for chest pain. She presented to the ED on 7/25/2024 for worsening of her chronic chest pain and 8/5/2024 for facial swelling and chronic chest pain.      She underwent bronchoscopic lung biopsy yesterday, 8/8/2024, with Dr. Niles Rodriguez. Pathology showed atypical glandular cells which may be consistent with adenocarcinoma in situ.     Post-procedure CXR showed increase pulmonary vascular congestion. She was admitted for optimization.        ---    Rheumatology: Dr. Trevizo  Pulmonology: Dr. Wasserman    Past Medical / Surgical History:  Pulmonary HTN, HTN, Raynaud's Disease, Cutaneous Systemic Scleroderma (dx 1998), ILD, PE (3/2023), Follicular Carcinoma of Thyroid, Tenosynovitis, de Quervain, h/o Hernia Repair, h/o Appendicitis, h/o Digit Amputation, H/o Total Thyroidectomy (Dr. Pacheco at [facility]; 4/2013)    Family & Social History: She is , she has two children. She has worked as an .     Tobacco: former 2005  EtOH: never   Illicits: never     Her brother has CAD with stent  1st cousin with SLE  Both parents had \"heart problems\"    Allergies:   Allergies   Allergen Reactions    Aspirin Shortness of breath     Other reaction(s): Unknown  \"stop breathing\"      Ibuprofen Shortness of breath     Stop breathing    Iodine Shortness of breath     Other reaction(s): Unknown    Iodine And Iodide Containing Products Shortness of breath "     ? rash    Morphine Shortness of breath      Reported wamrth of face, improved with benadryl and cold compresses after getting morphine as well as episode of (subjective ) dyspnea, and thought she passed out - did have wamrth and erythema of face with mild edema R>L cheek notably on exam.     Penicillins Shortness of breath     Other reaction(s): Unknown    Sulfa (Sulfonamide Antibiotics) Angioedema    Clindamycin     Hydrochlorothiazide      Other reaction(s): Abdominal Pain   Slow heart rate    Oxycodone      Other reaction(s): Vomiting    Sulfamethoxazole-Trimethoprim      Other reaction(s): Vomiting, Weakness     Latex Rash       Medications:   Current Facility-Administered Medications   Medication Dose Route Frequency Provider Last Rate Last Admin    acetaminophen (TYLENOL) tablet 975 mg  975 mg oral q6h SPENCER Simon Cano DO   975 mg at 08/31/24 1152    amLODIPine (NORVASC) tablet 10 mg  10 mg oral q AM Simon Cano DO   10 mg at 08/31/24 0840    atorvastatin (LIPITOR) tablet 40 mg  40 mg oral Daily (6p) Simon Cano DO   40 mg at 08/22/24 1733    benzocaine-menthol (CEPACOL/CHLORASEPTIC) lozenge 1 lozenge  1 lozenge Mouth/Throat q2h PRN Simon Cano DO   1 lozenge at 08/28/24 0938    benzonatate (TESSALON) capsule 100 mg  100 mg oral 3x daily PRN Simon Cano DO   100 mg at 08/28/24 1344    [Provider Managed Hold] bisacodyL (DULCOLAX) 10 mg suppository 10 mg  10 mg rectal TID Molly Hidalgo CRNP   10 mg at 08/24/24 2015    carboxymethylcellulose (REFRESH PLUS) 0.5 % ophthalmic dropperette 1 drop  1 drop Right Eye 3x daily PRN Simon Cano DO   1 drop at 08/20/24 0817    clopidogreL (PLAVIX) tablet 75 mg  75 mg oral Daily Simon Cano DO   75 mg at 08/31/24 0840    cyanocobalamin (VITAMIN B12) tablet 1,000 mcg  1,000 mcg oral Daily Simon Cano DO   1,000 mcg at 08/31/24 0840     cyclobenzaprine (FLEXERIL) tablet 5 mg  5 mg oral 3x daily PRN Simon Cano DO   5 mg at 08/29/24 2048    glucose chewable tablet 16-32 g of dextrose  16-32 g of dextrose oral PRN Simon Cano DO        Or    dextrose 40 % oral gel 15-30 g of dextrose  15-30 g of dextrose oral PRN Simon Cano DO        Or    glucagon (GLUCAGEN) injection 1 mg  1 mg intramuscular PRN Simon aCno DO        Or    dextrose 50 % in water (D50) injection 12.5 g  25 mL intravenous PRN Simon Cano DO        dicyclomine (BENTYL) capsule 10 mg  10 mg oral 4x daily PRN Ritu Peterson DO   10 mg at 08/30/24 0517    ethacrynic acid (EDECRIN) tablet 200 mg  200 mg oral Once Ba Figueroa MD        famotidine (PEPCID) injection 20 mg  20 mg intravenous Daily PRN Molly Hidalgo CRNP        Or    famotidine (PEPCID) tablet 20 mg  20 mg oral Daily PRN Molly Hidalgo CRNP   20 mg at 08/31/24 0053    gabapentin (NEURONTIN) capsule 200 mg  200 mg oral TID Maddy Cope MD   200 mg at 08/31/24 0840    guaiFENesin (MUCINEX) 12 hr ER tablet 600 mg  600 mg oral BID Molly Hidalgo CRNP   600 mg at 08/31/24 0840    honey (MEDIHONEY) 100 % topical paste   Topical Daily PRN Simon Cano DO   Given at 08/27/24 2316    [Provider Managed Hold] HYDROmorphone (DILAUDID) tablet 2 mg  2 mg oral q4h PRN Molly Hidalgo CRNP   2 mg at 08/27/24 2057    levothyroxine (SYNTHROID) tablet 100 mcg  100 mcg oral Daily (6:30a) Simon Cano DO   100 mcg at 08/31/24 0559    lidocaine (ASPERCREME) 4 % topical patch 1 patch  1 patch Topical Daily Simon Cano DO   1 patch at 08/31/24 0842    lidocaine (ASPERCREME) 4 % topical patch 1 patch  1 patch Topical Daily Simon Cano DO   1 patch at 08/31/24 0842    lidocaine (ASPERCREME) 4 % topical patch 1 patch  1 patch Topical Daily Simon Cano DO   1 patch at 08/31/24 0842    macitentan  (OPSUMIT) tablet 10 mg (Patient Owned)  10 mg oral Daily Simon Cano DO   10 mg at 08/31/24 0840    melatonin ODT 3 mg  3 mg oral Nightly PRN Simon Cano DO   3 mg at 08/28/24 2321    metoclopramide (REGLAN) tablet 5 mg  5 mg oral 3x daily PRN Ba Figueroa MD   5 mg at 08/31/24 0047    [Provider Managed Hold] mineral oil enema 133 mL  133 mL rectal BID Maddy Cope MD   133 mL at 08/24/24 1243    ondansetron (ZOFRAN) injection 4 mg  4 mg intravenous q6h SPENCER Molly Hidalgo CRNP   4 mg at 08/31/24 1151    pantoprazole (PROTONIX) tablet,delayed release (DR/EC) 40 mg  40 mg oral Daily Molly Hidalgo CRNP   40 mg at 08/31/24 0840    peg 400-hypromellose-glycerin (ARTIFICIAL TEARS) 1-0.2-0.2 % eye drops 1 drop  1 drop Both Eyes q4h while awake Simon Cano DO   1 drop at 08/31/24 0841    phenoL (SORE THROAT SPRAY) 1.4 % mucosal spray 1 spray  1 spray Mouth/Throat q2h PRN Simon Cano DO        senna (SENOKOT) tablet 2 tablet  2 tablet oral BID Maddy Cope MD   2 tablet at 08/31/24 0840    sildenafiL (pulm.hypertension) (REVATIO) tablet 20 mg  20 mg oral TID Simon Cano DO   20 mg at 08/31/24 0840    simethicone (MYLICON) chewable tablet 80 mg  80 mg oral 4x daily PRN Ritu Peterson DO   80 mg at 08/31/24 0600    sodium chloride (OCEAN) 0.65 % nasal spray 2 spray  2 spray Each Nostril Daily Simon Cano DO   2 spray at 08/31/24 0841    trimethobenzamide (TIGAN) injection 200 mg  200 mg intramuscular Once Yamilka Antunez MD        white petrolatum (AQUAPHOR) ointment   Topical q4h PRN Simon Cano DO   Given at 08/13/24 2117    white petrolatum (AQUAPHOR) ointment   Topical BID Simon Cano DO   Given at 08/31/24 0841    white petrolatum-mineral oil (ARTIFICIAL TEARS OINT) ophthalmic ointment   Both Eyes Nightly Siomn Cano DO   Given at 08/29/24 2059       Review of Systems:   All  other systems reviewed and are negative except as per HPI.    Physical Exam:     Wt Readings from Last 10 Encounters:   08/31/24 53.9 kg (118 lb 14.4 oz)   08/05/24 49.4 kg (109 lb)   08/05/24 49.4 kg (109 lb)   07/20/24 49.5 kg (109 lb 2 oz)   07/19/24 51.3 kg (113 lb)   06/20/24 51.7 kg (114 lb)   06/14/24 52.6 kg (116 lb)   05/23/24 52.4 kg (115 lb 9.6 oz)   04/17/24 53.5 kg (118 lb)   04/10/24 44.5 kg (98 lb)       BP Readings from Last 5 Encounters:   08/31/24 129/62   08/05/24 (!) 148/75   07/25/24 (!) 155/77   07/23/24 (!) 101/57   07/19/24 (!) 154/80       Vitals:    08/31/24 1145   BP: 129/62   Pulse: (!) 101   Resp: 16   Temp: 36.8 °C (98.2 °F)   SpO2: 98%       Constitutional: NAD, AAOx3  HENT: Normocephalic, atraumatic head, sclerae anicteric, no cervical lymphadenopathy, trachea midline, facial swelling  Cardiovascular: RRR, no murmurs, rubs or gallops, JVP: 10-11 cm H2O   cm H2O, no carotid bruits.  Respiratory: CTA bilateral lung fields, no wheezes, rales or rhonchi  GI: soft, non-tender/non-distended  Musculoskeletal / Extremities: +2 pulses bilateral radial, DP/PT arteries, skin tightening on face and fingertips, ulceration and discoloration of 2nd phalange b/l LE.   Skin: no rashes. Facial changes c/w scleroderma  Neuro / Psych: no focal deficits, CNII-XII grossly intact, appropriate, cooperative    Diagnostic Data:   Results from last 7 days   Lab Units 08/31/24  0929 08/30/24  0913 08/29/24  1126   WBC K/uL 5.49 5.19 8.54   HEMOGLOBIN g/dL 9.5* 9.0* 9.1*   HEMATOCRIT % 30.8* 30.0* 30.2*   MCV fL 85.6 86.7 86.8   PLATELETS K/uL 320 292 284     Results from last 7 days   Lab Units 08/31/24  0929 08/30/24  0913 08/29/24  1126   SODIUM mEQ/L 140 140 142   POTASSIUM mEQ/L 4.6 4.9 4.2   CHLORIDE mEQ/L 104 108* 109*   CO2 mEQ/L 28 27 24   BUN mg/dL 35* 28* 28*   CREATININE mg/dL 1.6* 1.3* 1.3*   CALCIUM mg/dL 9.5 9.0 9.0   GLUCOSE mg/dL 83 76 125*   MAGNESIUM mg/dL 2.2 1.8 1.6*       Component       Latest Ref Rng 7/20/2024 7/25/2024   Sodium      136 - 145 mEQ/L 142  139    Potassium, Bld      3.5 - 5.1 mEQ/L 4.2  4.3    Chloride      98 - 107 mEQ/L 107  103    CO2      21 - 31 mEQ/L 25  27    BUN      7 - 25 mg/dL 17  17    Creatinine      0.6 - 1.2 mg/dL 1.2  1.1    Glucose      70 - 99 mg/dL 85  86    Calcium      8.6 - 10.3 mg/dL 9.3  10.6 (H)    AST (SGOT)      13 - 39 IU/L  15    ALT (SGPT)      7 - 52 IU/L  8    Alkaline Phosphatase      34 - 125 IU/L  82    Total Protein      6.0 - 8.2 g/dL  8.8 (H)    Albumin      3.5 - 5.7 g/dL  4.3    Bilirubin, Total      0.3 - 1.2 mg/dL  0.4    eGFR      >=60.0 mL/min/1.73m*2 51.0 (L)  56.6 (L)    Anion Gap      3 - 15 mEQ/L 10  9       Component      Latest Ref Rng 7/20/2024 7/25/2024   WBC      3.80 - 10.50 K/uL 7.27  10.29    Hemoglobin      11.8 - 15.7 g/dL 9.8 (L)  11.2 (L)    Hematocrit      35.0 - 45.0 % 32.0 (L)  37.3    MCV      83.0 - 98.0 fL 80.4 (L)  80.4 (L)    Platelets      150 - 369 K/uL 206  331       Component      Latest Ref Rng 7/19/2024 7/25/2024 8/9/2024   High Sens Troponin I      <15.0 pg/mL 89.2 (HH)  66.8 (HH)  92.5 (HH)    High Sens Troponin I       90.3 (HH)  83.4 (HH)         Component      Latest Ref Rng 5/22/2024 6/22/2024 7/19/2024 7/25/2024   BNP      <=100 pg/mL 352 (H)  150 (H)  151 (H)  220 (H)       Component      Latest Ref Rng 4/16/2024 5/23/2024 7/21/2024 7/22/2024   Sed Rate      0 - 30 mm/hr 101 (H)  79 (H)  105 (H)  >119 (H)       Component      Latest Ref Rng 4/18/2024 5/24/2024   WBC      3.80 - 10.50 K/uL 8.40  16.22 (H)    RBC      3.93 - 5.22 M/uL 3.54 (L)  3.82 (L)    Hemoglobin      11.8 - 15.7 g/dL 9.8 (L)  10.3 (L)    Hematocrit      35.0 - 45.0 % 32.8 (L)  33.9 (L)    Platelets      150 - 369 K/uL 254  289       Component      Latest Ref Rng 5/24/2024   Magnesium      1.8 - 2.5 mg/dL 2.1      Component      Latest Ref Rng 4/13/2023 3/7/2024 5/22/2024   TSH      0.34 - 5.60 mIU/L 4.71  0.28 (L)  1.03       Component      Latest Ref Rn 5/24/2024   Sodium      136 - 145 mEQ/L 138    Potassium, Bld      3.5 - 5.1 mEQ/L 4.4    Chloride      98 - 107 mEQ/L 104    CO2      21 - 31 mEQ/L 24    BUN      7 - 25 mg/dL 40 (H)    Creatinine      0.6 - 1.2 mg/dL 1.1    Glucose      70 - 99 mg/dL 87    Calcium      8.6 - 10.3 mg/dL 9.5    eGFR      >=60.0 mL/min/1.73m*2 56.6 (L)    Anion Gap      3 - 15 mEQ/L 10      Component      Latest Ref Rn 4/13/2023   Hemoglobin A1C      <5.7 % 4.4        Component      Latest Ref Rn 4/14/2023   Triglycerides      30 - 149 mg/dL 44    Cholesterol      <=200 mg/dL 194    HDL      >=55 mg/dL 49 (L)    LDL Calculated      <=100 mg/dL 136 (H)    Non-HDL, Calculated      mg/dL 145    RISK      <=5.0  4.0       Component      Latest Ref [facility] 4/13/2023   High Sens Troponin I      <15.0 pg/mL 23.0 (H)       Component      Latest Ref Rn 4/14/2023   Ferritin      11 - 250 ng/mL 75      Component      Latest Ref [facility] 4/14/2023   Iron      35 - 150 ug/dL 29 (L)    TIBC      270 - 460 ug/dL 245 (L)    UIBC      180 - 360 ug/dL 216    Iron Saturation      15 - 45 % 12 (L)        Component      Latest Ref Rn 6/27/2022 4/13/2023   BNP      <=100 pg/mL 111 (H)  105 (H)                          ---    ECG (8/29/2024, personally reviewed):   Sinus Rhythm   Lateral T-wave abnormality   HR: 81 bpm     ---    Lower extremity arterial duplex and ENRRIQUE August 10, 2024:  Right ankle ENRRIQUE (DP): 0.74.  Right ankle ENRRIQUE (PT): 0.88.  Left ankle ENRRIQUE (DP): 0.90.  Left ankle ENRRIQUE (PT): 0.62.    Findings concerning for hemodynamically significant stenosis within the  bilateral popliteal arteries and distal left femoral artery. There are also  findings which can be seen with inflow stenosis at the level left common femoral  artery. CTA of the abdomen and pelvis with lower extremity runoff could be  performed for further evaluation if clinically indicated.    CT Chest / Abdomen / Pelvis w/ Contrast (7/25/2024, personally  reviewed):     Lung bases: Left-sided pleural effusion, left parenchymal consolidation, and  findings of interstitial lung disease.  More nodular density in the medial  aspect of the left lower lobe measuring 1.6 x 1.9 cm.  Cardiomegaly with a  pericardial effusion.     Liver: The liver is normal in size.  There is no focal mass.  Hepatic veins and  portal veins are patent without focal filling defects to suggest thrombosis..     Gallbladder:  Small dependent gallstones.  No gallbladder wall thickening..     Spleen: Spleen is normal in size without focal mass lesion..     Pancreas: The pancreas is normal without focal mass, parenchymal atrophy, or  pancreatic duct dilation..     Adrenals: The adrenals are normal bilaterally without focal mass..     Kidneys, ureters and bladder:  Symmetric enhancement and excretion..  Left lower  pole cystic lesion measures 3.2 cm in maximal dimension.  This measures greater  than simple fluid density.  This measured 60 Hounsfield units on the noncontrast  CT from 2020.  It measures 36 Hounsfield units on today's study.  Therefore it  is most in keeping with a hemorrhagic/proteinaceous cyst.  Additional  hypodensities too small to characterize.     Retroperitoneal structures: There is no abdominal aortic aneurysm.  Atherosclerotic calcification.  There is no retroperitoneal adenopathy or  retroperitoneal mass..     Bowel and mesentery: No evidence of a focal inflammatory or obstructive process.  Moderate fecal retention throughout the colon.  There are a few fluid-filled  small bowel loops in the pelvis, nonspecific.     Lymph nodes: No pathologic lymphadenopathy..     Pelvis: The uterus is normal in size.  The ovaries are normal.  There is no  adnexal mass or pelvic free fluid.     Bones: Within normal limits.    No evidence for pulmonary embolism.  Persistent left-sided pleural effusion and airspace disease at the left lung  base.  Underlying interstitial lung disease.     TTE  (7/19/2024, personally reviewed):   Mild increased wall thickness with normal left ventricular cavity and preserved systolic function. EF 65%. No wall motion abnormalities.   Normal right ventricular size with preserved systolic function.   IVC normal in size with >50% respiratory variation consistent with normal right sided filling pressures.   Small pericardial effusion. No evidence of hemodynamic compromise with normal respiratory variation and no chamber collapse. Prominent epicardial fat.   Compared with previous study of 5/23/2024, small pericardial effusion persists.      PET/CT Skull-to-Thigh (6/4/2024):   An approximately 1.6 cm pulmonary nodule at the medial left lung base is  significantly FDG avid at max SUV 8.5.  This is nonspecific and can be seen in  the setting of benign pathology however malignancy is a concern.     There is more mild to moderate nonspecific uptake localizing to lita-fissural  nodules particularly on the left.     There is  moderate nonspecific left hilar and mediastinal dawson uptake.     No additional suspicious FDG avid findings appreciated.     CTA Chest (5/21/2024):   IMPRESSION:  1.  No evidence of acute pulmonary embolism.  2.  Multiple new perifissural left lower lobe nodules, suspicious for a  neoplastic process. PET/CT and or tissue sampling is recommended.  3.  Chronic interstitial lung disease in an NSIP pattern, with overall slightly  progressed appearance since March 2023. Pulmonary consultation is recommended.  4.  Stable cardiomegaly and large pericardial effusion.  5.  Mediastinal and supraclavicular adenopathy, similar to prior study, also  indeterminate, which could be further evaluated at time of PET/CT.     CTA Chest (4/16/2024):   IMPRESSION:  1. No evidence of pulmonary embolism.  2. Stable cardiomegaly and large pericardial effusion. Cardiology consultation  suggested.  3. Emphysema with bilateral lower lobe groundglass opacity and bronchiectasis in  an NSIP  pattern, which can be seen with scleroderma. Pulmonary consultation  suggested.  4. Stable 16 mm left lower lobe masslike density which may represent  atelectasis, lung cancer or lymphoma. When clinically appropriate PET/CT  suggested.  5. Stable prominence of the main pulmonary artery which can be seen with  pulmonary hypertension     TTE (5/23/2024, personally reviewed):  Mild increased wall thickness with normal left ventricular cavity and preserved systolic function. EF 65%. No wall motion abnormalities.   Normal right ventricular size with preserved systolic function.   Tiny IVC with >50% respiratory variation consistent with low-normal right sided filling pressures.   Small pericardial effusion, predominantly adjacent to right atrium. No evidence of hemodynamic compromise with normal respiratory variation and no chamber collapse. Prominent epicardial fat.   Compared with previous study of 4/10/24, small pericardial effusion persists.      TTE (4/10/2024, personally reviewed):  1. Normal left ventricular cavity size with mild concentric left ventricular hypertrophy. Normal systolic function. EF: 60%. No regional wall motion abnormalities. Cannot determine diastolic function. Global longitudinal strain not reported due to technical limitations of study.   2. Aortic valve has three cusps. Trace aortic regurgitation. Aortic valve and root sclerosis.  3. Thickened mitral valve leaflets. Trace mitral valve regurgitation. Normal left atrial size.   4. Normal right ventricular structure and function. Trace tricuspid valve regurgitation with estimated RVSP: 51 mmHg (assuming right atrial pressure of 3 mmHg). Normal right atrial size. Pulmonic valve structurally normal. Trace pulmonic valve regurgitation. Pulsed wave Doppler of the RVOT systolic profile shows decreased acceleration times and geometry consistent with mildly elevated PVR.  5. IVC size is normal with > 50% inspiratory collapse consistent with normal right  atrial pressure. Small-to-moderate sized, circumferential pericardial effusion. No echocardiographic evidence for cardiac tamponade.   6. Compared to previous study from 3/4/2024, no significant change. Pericardial effusion size is similar, estimated right atrial pressure lower.           CTA Abd / Pelvis (3/13/2024, personally reviewed):   There is dilatation of the of proximal/mid small bowel with relatively abrupt  transition left hemiabdomen, likely obstruction. Questionable lesion at the  transition point. This could be better evaluated with CT or MRI enterography.     Aorta and major branches patent. Superior mesenteric artery patent without  significant stenosis.  Suspect moderate stenosis of the origins of the of renal arteries bilaterally.     Large pericardial effusion, no change.     Patchy opacities lower lungs, similar to prior.      Coronary CTA (3/11/2024):   1. Two-vessel coronary artery disease as above that is moderate in severity.  CT  FFR is normal..  2. There is mitral annular calcification and aortic sclerosis.  The right atrium  is enlarged.  There is left ventricular hypertrophy.  There is a moderate to  large circumferential pericardial effusion.  3. Normal left ventricular wall motion and systolic function.  4. Coronary calcium score 313, 96th percentile     TTE (3/4/2024, personally reviewed):   Normal-sized left ventricle. Mild left ventricular hypertrophy. Preserved systolic function. LVEF 60%. No regional wall motion abnormalities. Grade II diastolic dysfunction.  Normal global longitudinal strain (-20%).  The aortic valve is not well seen.  Cannot determine the number of cusps.  Sclerotic leaflets.  No aortic stenosis.  Trace aortic insufficiency.  Normal sized aortic root.  The visualized portion of the ascending aorta is normal in size.  The mitral valve leaflets are thickened. Mild mitral annular calcification. Trace mitral regurgitation.   Mildly dilated left atrium.  Normal-sized  right ventricle. Normal right ventricular systolic function.  Thickened tricuspid valve. There is mild tricuspid regurgitation with estimated RVSP of 46 mmHg.   Pulmonic valve is not well visualized. Trace pulmonic regurgitation.   Mildly dilated right atrium.  IVC is enlarged with <50% respiratory variation consistent with elevated right atrial filling pressure (at least 15 mmHg).   Moderate, circumferential, circumferential pericardial effusion.  The largest pocket is located inferolaterally.  Elevated right atrial pressure.  No other clear echocardiographic features of increased pericardial pressure.  Correlate clinically.   Compared to previous TTE from 6/21/2023, pericardial effusion now moderate in size.  Right atrial pressure now elevated.         CTA Chest (3/3/2024):   IMPRESSION:  1.  No evidence of acute pulmonary embolism to the level of the segmental  branches.  2.  Moderate to large pericardial effusion measuring higher than simple fluid  density.  3.  Lower lobe lung field predominant interstitial thickening with some relative  subpleural sparing, consistent with a chronic interstitial lung disease,  unchanged from the prior study.  4.  Continued bilateral axillary, mediastinal, and bilateral hilar  lymphadenopathy, not definitely changed from the prior study, possibly related  to the patient's sarcoid.  5.  Emphysema.      CTA Chest PE (10/4/2023):  IMPRESSION:  1. No evidence for filling defect or vessel cutoff to suggest pulmonary  embolism.  Enlargement of the pulmonary arteries compatible with pulmonary  arterial hypertension.  2. Changes throughout the lungs as described above which can be seen with  systemic sclerosis/scleroderma.  Underlying pneumonia the lung bases cannot be  entirely excluded in the proper clinical setting.  3.  Additional stable findings as described above.        VQ (9/12/2023):  IMPRESSION:  Intermediate to high probability of pulmonary embolic disease.     6MWT  (7/25/2023, on Sildenafil 20 mg TID):        TTE (6/21/2023, personally reviewed):  1. Normal left ventricular cavity size and mild concentric left ventricular hypertrophy. Normal systolic function. EF: 60%. No regional wall motion abnormalities. Grade I diastolic dysfunction.   2. Aortic valve cusps not well visualized. Trace aortic regurgitation. Aortic valve and root sclerosis.  3. Thickened mitral valve leaflets. Mild mitral annular calcification. Trace mitral regurgitation. Mildly dilated left atrial size.   4. Normal right ventricular structure and function. Mild tricuspid valve regurgitation with estimated RVSP: 55 mmHg (assuming right atrial pressure of 3 mmHg). Normal right atrial size. Pulmonic valve not well visualized. Trace pulmonic valve regurgitation. Pulsed wave Doppler of the RVOT systolic profile show decreased acceleration times ( msec) and late systolic notching consistent with elevated PVR.   5. IVC size is normal with > 50% inspiratory collapse consistent with normal right atrial pressure. Small pericardial effusion without echocardiographic evidence of tamponade.  6. Compared to previous study from 4/14/2023, estimated right ventricular systolic pressure is higher.        TTE (4/14/2023, personally reviewed):   The left ventricular cavity size is normal with mild left ventricular hypertrophy and preserved systolic function. Estimated EF 65%. No regional wall motion abnormalities. Grade I diastolic dysfunction.     The aortic valve is tricuspid. Aortic valve sclerosis. Trace aortic regurgitation.      The visualized portions of the aortic root and ascending aorta are normal in size.     The mitral valve is mildly thickened. Mild mitral annular calcification. Trace mitral regurgitation.       The left atrium is severely dilated.      The right ventricle is normal in size with preserved systolic function     The pulmonic valve is not well seen.     The tricuspid valve is normal in  appearance. mild tricuspid regurgitation with an estimated RVSP of 34 mmHg.       Right atrium is normal in size.     The IVC is small and collapses > 50% with inspiration consistent with normal right atrial pressures.     Small pericardial effusion, mostly posterior.       Compared to previous echocardiogram from 8/22/2022, there is no significant change        TTE (11/28/2022, personally reviewed):   Normal right- and mildly elevated left-sided filling pressures (RA: 3 mmHg, PCWP: 13 mmHg)  Elevated pulmonary artery pressures (60/22(35 mmHg)) in the setting of PCWP: 13 mmHg.   Normal cardiac output and index (CO: 5.1 L/min, CI: 3.2 L/min/m2)  PVR: 4.3 Wood units. SVR: 1840 dynes/sec/cm5      TTE (8/22/2022, personally reviewed):  Normal-sized LV. Normal LV systolic function. Estimated EF 55- 60%. Wall motion appears grossly normal. Mild concentric left ventricular hypertrophy. Grade I LV diastolic dysfunction.  Pulmonic valve not well visualized. Grossly normal pulmonic valve structure. Trace pulmonic valve regurgitation. No pulmonic valve stenosis.  Aortic valve not well visualized. Aortic valve not well seen but likely trileaflet. Focal aortic valve calcification present. Sclerotic aortic valve leaflets. Mild aortic valve regurgitation. No aortic valve stenosis.  RV not well visualized. Normal-sized RV. Normal RV systolic function.  Normal-sized RA.  There is a small loculated pericardial effusion anterior to the right atrium. No cardiac tamponade.  Sclerotic mitral valve. Mitral valve calcification of the anterior leaflet present.  Trace mitral valve regurgitation.  No significant mitral valve stenosis.  Normal-sized IVC. IVC demonstrates normal respiratory collapse.  Tricuspid valve structure is grossly normal. Mild to moderate tricuspid valve regurgitation.  Estimated RVSP = 50-55 mmHg.  Mildly dilated LA.  No previous echocardiogram available for comparison.     TTE (4/21/2022, outside study):  This result  has an attachment that is not available.     A complete transthoracic echocardiogram (including 2D, color flow   Doppler, spectral Doppler and M-mode imaging) was performed using the   standard protocol. The study quality was adequate.     The left ventricle is normal in size. There is moderately increased   wall thickness consistent with moderate concentric hypertrophy.   Endocardium is incompletely visualized, but no regional wall motion   abnormalities are noted in the visualized segments. Normal left   ventricular ejection fraction. The left ventricular ejection fraction by   visual estimate is 55% to 60%.     The right ventricle is normal in size. There is normal function of the   right ventricle.     The left atrium is normal in size. Left atrial volume is 58 mL.     The right atrial volume measures 52 mL. The right atrial volume index   measures 34 mL/m2.     There is trace mitral regurgitation. There is no mitral stenosis.     The aortic valve is trileaflet. There is mild aortic valve thickening.   There is no aortic stenosis. There is trace aortic regurgitation.     There is mild to moderate tricuspid regurgitation. Pulmonary artery   systolic pressure measures 52 mmHg. The pulmonary artery systolic pressure   is moderately elevated.     The aorta measures 3.2 cm at the sinus of Valsalva. The proximal   segment of the ascending aorta is mildly enlarged. The proximal segment of   the ascending aorta measures 3.4 cm. The proximal segment of the ascending   aorta measures 2.2 cm/m2, indexed for body surface area.     Trivial pericardial effusion present. There is no echocardiographic   evidence of cardiac tamponade.     The inferior vena cava is normal in size (diameter <21 mm) and   decreases >50% in size with inspiration, suggesting a normal right atrial   pressure of 3 mmHg (range 0-5 mm Hg).     Compared to the prior study of 3/13/2020, there are no significant   changes.        PFT (3/23/2022):      PFT  (7/14/2021):      CT Chest (5/2/2021, outside study):  CLINICAL INFORMATION: Systemic sclerosis. Interstitial lung disease. Groundglass nodule     PROCEDURE: Unenhanced CT of the chest was performed using thin section reconstructions. Images were obtained on inspiration and expiration.     COMPARISON: June and August 2020     FINDINGS:     LUNGS, PLEURA:     Groundglass opacities with reticulation with subpleural sparing in the lower lobes, without honeycombing or architectural distortion, unchanged.     Right upper lobe groundglass nodule, image 114 of series 3, 8 x 11 mm, previously 6 mm.     Expiratory images are of diagnostic quality and do not demonstrate evidence of clinically significant air trapping.     CARDIOVASCULAR, MEDIASTINUM, THYROID: Coronary calcifications. Trace pericardial effusion.     LYMPH NODES: Subcentimeter mediastinal lymph nodes, unchanged. Unchanged axillary lymph nodes.     SKELETON, CHEST WALL: No destructive bone lesion     UPPER ABDOMEN: Patulous esophagus. 3 mm right renal stone.       RHC (9/02/2020):  RA   8 mmHg   RV   40/5 mmHg   PA (mean)  44/16 (25) mmHg   PCWP   12 mmHg   CO   4.65 lpm (Thermodilution)   CI   2.65 lpm/m-squared   SVI    33 ml/beat/m-squared (lower limit normal 29)   PVR   2.8 mmHg/l/min (Wood units)   SVR   2064 dyne-sec-cm-5         PFT (3/18/2020):      TTE (3/13/2020, outside study):  · The study quality was good.   · The left ventricle is normal in size. Top normal left ventricular wall   thickness. There are no segmental wall motion abnormalities. The left   ventricular ejection fraction is 55-60% by visual estimate and 3D   echocardiography. Normal left ventricular ejection fraction.   · There is grade I (mild) LV diastolic dysfunction.   · The right ventricle is normal in size. There is normal function of the   right ventricle.   · The right atrium is mildly dilated.   · There is mild mitral valve anterior leaflet thickening. There is mild   mitral  valve leaflet calcification. There is flattening of the mitral   leaflets without raphael prolapse. There is trace mitral regurgitation.   · The aortic valve is trileaflet. There is mild thickening of the aortic   valve. There is mild calcification of the aortic valve. There is no aortic   /stenosis. There is trace aortic regurgitation.   · There is mild tricuspid valve leaflet thickening. There is mild to   moderate tricuspid regurgitation. The pulmonary artery systolic pressure   is moderately elevated. Pulmonary artery systolic pressure measures 50   mmHg. There is mid-systolic notching of the RVOT VTI consistent with   elevated pulmonary vascular resistance.   · The inferior vena cava is normal in size (diameter <21 mm) and decreases   >50% in size with inspiration, suggesting a normal right atrial pressure   of 3 mmHg (range 0-5 mm Hg).   · Trivial loculated pericardial effusion present adjacent to the right   ventricle and adjacent to the right atrium.          Assessment / Plan:     1. Chest Pain:  - Currently resolved  - Given pattern and character, I believe this has musculoskeletal / serous pain from her autoimmune disease. There may be an element of myopericarditis.  - She reports that pain has not responded to increased in steroids in the past   - Non-ACS chest pain and known non-obstructive CAD as recently as March 2024 CCTA.     2. Pulmonary Hypertension (WHO Group I, NYHA/WHO FC III):   - Occurring in the background of Systemic Scleroderma with ILD. August 2022 TTE showed normal RV size and function, but progressive exertional decline, worsened DLCO and resting tachycardia suggest there may be progression of her pulmonary vascular disease and limitation of cardiac output. This was proven with 11/2022 RHC showing mean PA 35 mmHg (with PCWP 13 mmHg) and PVR 4.3 Wood units. Recent TTEs have shown appropriate RV:PA coupling with normal RV size and systolic function.   -She is still hypervolemic on exam  however weights are down compared to yesterday.  -Would continue to redose Metolazone 2.5 mg PO x1 followed by PO Ethacrynic Acid 200 mg x1 again this AM and evaluate response for additional dosing(s) later today. Continue daily standing weights.  I's and O's. Dry weight in mid-to-low 110s.  Her weight is down to 118 pounds so would continue on same regiment for now.  - Needs labs for electrolytes and renal function today  - Continue Sildenafil 20 mg TID and uninterrupted Macitentan 10 mg daily    3. HTN:   - Continue Amlodipine.     4. Systemic Scleroderma / Raynaud's Disease:   - Per Rheumatology (Dr. Trevizo).      5. ILD:   - Per Pulmonology and Rheumatology (Dr. Trevizo). She has not tolerated trials of Mycophenolate. She has been receiving Tocilizumab and got dose yesterday.    6. PE:   - She has had elevated probability on V/Q scans, but CTA Chest has consistently not shown evidence for acute or chronic thromboembolism. Apixaban has been discontinued.     7. CAD:   - Two-vessel non-obstructive disease on March 2024 CCTA. She is continued on Atorvastatin 40 mg daily.     8. PAD with Toe Ulcerations  -S/P left PTA dSFA/TP and DCB to popliteal arteries 8/14/24. Followed by vascular surgery.   -Hematoma noted with drop in hgb - clopidogrel was held.  -She received 1 unit of blood with improvement in hgb.   -continue Clopidogrel and Atorvastatin.     9. Lung Mass  - Pathology from 8/8 biopsy with atypical glandular cells, possibly consistent with adenocarcinoma  - Management per pulmonology, outpatient follow up.     Qamar Glover,   8/31/2024

## 2024-09-01 LAB
ANION GAP SERPL CALC-SCNC: 11 MEQ/L (ref 3–15)
BUN SERPL-MCNC: 41 MG/DL (ref 7–25)
CALCIUM SERPL-MCNC: 9.8 MG/DL (ref 8.6–10.3)
CHLORIDE SERPL-SCNC: 103 MEQ/L (ref 98–107)
CO2 SERPL-SCNC: 26 MEQ/L (ref 21–31)
CREAT SERPL-MCNC: 2.2 MG/DL (ref 0.6–1.2)
EGFRCR SERPLBLD CKD-EPI 2021: 24.6 ML/MIN/1.73M*2
ERYTHROCYTE [DISTWIDTH] IN BLOOD BY AUTOMATED COUNT: 24.7 % (ref 11.7–14.4)
GLUCOSE SERPL-MCNC: 109 MG/DL (ref 70–99)
HCT VFR BLD AUTO: 35.2 % (ref 35–45)
HGB BLD-MCNC: 10.5 G/DL (ref 11.8–15.7)
MAGNESIUM SERPL-MCNC: 2.1 MG/DL (ref 1.8–2.5)
MCH RBC QN AUTO: 26.3 PG (ref 28–33.2)
MCHC RBC AUTO-ENTMCNC: 29.8 G/DL (ref 32.2–35.5)
MCV RBC AUTO: 88 FL (ref 83–98)
PDW BLD AUTO: 9.8 FL (ref 9.4–12.3)
PLATELET # BLD AUTO: 341 K/UL (ref 150–369)
POTASSIUM SERPL-SCNC: 4 MEQ/L (ref 3.5–5.1)
RBC # BLD AUTO: 4 M/UL (ref 3.93–5.22)
SODIUM SERPL-SCNC: 140 MEQ/L (ref 136–145)
WBC # BLD AUTO: 5.15 K/UL (ref 3.8–10.5)

## 2024-09-01 PROCEDURE — 63700000 HC SELF-ADMINISTRABLE DRUG: Performed by: INTERNAL MEDICINE

## 2024-09-01 PROCEDURE — 83735 ASSAY OF MAGNESIUM: CPT | Performed by: INTERNAL MEDICINE

## 2024-09-01 PROCEDURE — 63600000 HC DRUGS/DETAIL CODE: Mod: JZ | Performed by: NURSE PRACTITIONER

## 2024-09-01 PROCEDURE — 93005 ELECTROCARDIOGRAM TRACING: CPT | Performed by: NURSE PRACTITIONER

## 2024-09-01 PROCEDURE — 80048 BASIC METABOLIC PNL TOTAL CA: CPT | Performed by: INTERNAL MEDICINE

## 2024-09-01 PROCEDURE — 63700000 HC SELF-ADMINISTRABLE DRUG: Performed by: STUDENT IN AN ORGANIZED HEALTH CARE EDUCATION/TRAINING PROGRAM

## 2024-09-01 PROCEDURE — 21400000 HC ROOM AND CARE CCU/INTERMEDIATE

## 2024-09-01 PROCEDURE — 63700000 HC SELF-ADMINISTRABLE DRUG: Performed by: NURSE PRACTITIONER

## 2024-09-01 PROCEDURE — 99233 SBSQ HOSP IP/OBS HIGH 50: CPT | Performed by: INTERNAL MEDICINE

## 2024-09-01 PROCEDURE — 85027 COMPLETE CBC AUTOMATED: CPT | Performed by: INTERNAL MEDICINE

## 2024-09-01 PROCEDURE — 36415 COLL VENOUS BLD VENIPUNCTURE: CPT | Performed by: INTERNAL MEDICINE

## 2024-09-01 RX ORDER — ETHACRYNIC ACID 25 MG/1
50 TABLET ORAL DAILY
Status: DISCONTINUED | OUTPATIENT
Start: 2024-09-02 | End: 2024-09-03 | Stop reason: HOSPADM

## 2024-09-01 RX ADMIN — METOCLOPRAMIDE 5 MG: 10 TABLET ORAL at 08:24

## 2024-09-01 RX ADMIN — SILDENAFIL 20 MG: 20 TABLET, FILM COATED ORAL at 20:32

## 2024-09-01 RX ADMIN — LEVOTHYROXINE SODIUM 100 MCG: 0.1 TABLET ORAL at 06:12

## 2024-09-01 RX ADMIN — GABAPENTIN 200 MG: 100 CAPSULE ORAL at 08:23

## 2024-09-01 RX ADMIN — CLOPIDOGREL 75 MG: 75 TABLET ORAL at 08:24

## 2024-09-01 RX ADMIN — ACETAMINOPHEN 975 MG: 325 TABLET ORAL at 00:02

## 2024-09-01 RX ADMIN — ONDANSETRON 4 MG: 2 INJECTION INTRAMUSCULAR; INTRAVENOUS at 23:38

## 2024-09-01 RX ADMIN — CYANOCOBALAMIN TAB 1000 MCG 1000 MCG: 1000 TAB at 08:23

## 2024-09-01 RX ADMIN — SENNOSIDES 2 TABLET: 8.6 TABLET, FILM COATED ORAL at 08:24

## 2024-09-01 RX ADMIN — LIDOCAINE 4% 1 PATCH: 40 PATCH TOPICAL at 08:34

## 2024-09-01 RX ADMIN — ONDANSETRON 4 MG: 2 INJECTION INTRAMUSCULAR; INTRAVENOUS at 06:12

## 2024-09-01 RX ADMIN — GUAIFENESIN 600 MG: 600 TABLET ORAL at 20:32

## 2024-09-01 RX ADMIN — SALINE NASAL SPRAY 2 SPRAY: 1.5 SOLUTION NASAL at 08:42

## 2024-09-01 RX ADMIN — ONDANSETRON 4 MG: 2 INJECTION INTRAMUSCULAR; INTRAVENOUS at 12:21

## 2024-09-01 RX ADMIN — ACETAMINOPHEN 975 MG: 325 TABLET ORAL at 12:21

## 2024-09-01 RX ADMIN — LIDOCAINE 4% 1 PATCH: 40 PATCH TOPICAL at 08:35

## 2024-09-01 RX ADMIN — PANTOPRAZOLE SODIUM 40 MG: 40 TABLET, DELAYED RELEASE ORAL at 08:24

## 2024-09-01 RX ADMIN — SILDENAFIL 20 MG: 20 TABLET, FILM COATED ORAL at 13:44

## 2024-09-01 RX ADMIN — GLYCERIN 1 DROP: .002; .002; .01 SOLUTION/ DROPS OPHTHALMIC at 20:37

## 2024-09-01 RX ADMIN — ACETAMINOPHEN 975 MG: 325 TABLET ORAL at 06:12

## 2024-09-01 RX ADMIN — SILDENAFIL 20 MG: 20 TABLET, FILM COATED ORAL at 08:24

## 2024-09-01 RX ADMIN — ONDANSETRON 4 MG: 2 INJECTION INTRAMUSCULAR; INTRAVENOUS at 18:00

## 2024-09-01 RX ADMIN — GUAIFENESIN 600 MG: 600 TABLET ORAL at 08:24

## 2024-09-01 RX ADMIN — GLYCERIN 1 DROP: .002; .002; .01 SOLUTION/ DROPS OPHTHALMIC at 08:39

## 2024-09-01 RX ADMIN — GLYCERIN 1 DROP: .002; .002; .01 SOLUTION/ DROPS OPHTHALMIC at 06:12

## 2024-09-01 RX ADMIN — ACETAMINOPHEN 975 MG: 325 TABLET ORAL at 18:00

## 2024-09-01 RX ADMIN — AMLODIPINE BESYLATE 10 MG: 10 TABLET ORAL at 08:24

## 2024-09-01 RX ADMIN — GABAPENTIN 200 MG: 100 CAPSULE ORAL at 13:43

## 2024-09-01 RX ADMIN — GABAPENTIN 200 MG: 100 CAPSULE ORAL at 20:32

## 2024-09-01 RX ADMIN — WHITE PETROLATUM: 1.75 OINTMENT TOPICAL at 20:35

## 2024-09-01 RX ADMIN — ACETAMINOPHEN 975 MG: 325 TABLET ORAL at 22:08

## 2024-09-01 RX ADMIN — ONDANSETRON 4 MG: 2 INJECTION INTRAMUSCULAR; INTRAVENOUS at 00:02

## 2024-09-01 RX ADMIN — WHITE PETROLATUM: 1.75 OINTMENT TOPICAL at 00:03

## 2024-09-01 ASSESSMENT — COGNITIVE AND FUNCTIONAL STATUS - GENERAL
STANDING UP FROM CHAIR USING ARMS: 3 - A LITTLE
MOVING TO AND FROM BED TO CHAIR: 3 - A LITTLE
CLIMB 3 TO 5 STEPS WITH RAILING: 3 - A LITTLE
WALKING IN HOSPITAL ROOM: 3 - A LITTLE

## 2024-09-01 NOTE — PROGRESS NOTES
Hospital Medicine Service -  Daily Progress Note       SUBJECTIVE   Interval History: Patient seen and examined at bedside, she had no acute events overnight.    She reports having dryness in her mouth continues to have some nausea/vomiting, but able to tolerate orally.     OBJECTIVE      Vital signs in last 24 hours:  Temp:  [36.6 °C (97.9 °F)-36.9 °C (98.5 °F)] 36.6 °C (97.9 °F)  Heart Rate:  [] 76  Resp:  [16-18] 18  BP: (111-130)/(56-62) 130/57    Intake/Output Summary (Last 24 hours) at 9/1/2024 1048  Last data filed at 8/31/2024 1730  Gross per 24 hour   Intake 400 ml   Output --   Net 400 ml       PHYSICAL EXAMINATION        Physical Exam  General:chronically ill looking, on 2l n/c  HEENT: Symmetric, PERRL/EOMI, moist membranes, NCAT  Cardiovascular: Regular rate and rhythm, normal S1/S2, no murmurs, rubs, gallops, no JVD  Pulmonary: Clear to auscultation bilaterally, no wheezing, rhonchi  GI: Soft, nontender, nondistended, bowel sounds normal, no organomegaly  Musculoskeletal: Normal bulk and tone, normal ROM  SKIN: scarring, hyper/hypopigmentation on face, scalp, upper and lower extremities, has missing digits in in bilateral hand fingers, and gangrene toes bilateral LE  Extremities: Distal pulses intact, No LE edema bilaterally  Neuro: CN2-12 intact, sensation intact, with no motor deficits     LINES, CATHETERS, DRAINS, AIRWAYS, AND WOUNDS   Lines, Drains, and Airways:   Peripheral IV (Adult) 08/21/24 Anterior;Upper;Left Arm (Active)   Number of days: 4       Wound Anterior;Right Toe (2nd) (Active)   Number of days: 17       Wound Anterior;Left Toe (Great) (Active)   Number of days: 17       Wound Anterior;Left Toe (2nd) (Active)   Number of days: 17       Wound Arterial Ulcer Anterior;Right Ankle (Active)   Number of days: 5       Wound Arterial Ulcer Anterior;Left Ankle (Active)   Number of days: 5       Wound Anterior;Left Toe (5th) (Active)   Number of days: 5       Surgical Incision Groin  Right (Active)   Number of days: 11       Wounds (agree with documentation and present on admission)     LABS / IMAGING / TELE      Labs  Lab Results   Component Value Date    GLUCOSE 109 (H) 09/01/2024    CALCIUM 9.8 09/01/2024     09/01/2024    K 4.0 09/01/2024    CO2 26 09/01/2024     09/01/2024    BUN 41 (H) 09/01/2024    CREATININE 2.2 (H) 09/01/2024       Lab Results   Component Value Date    WBC 5.15 09/01/2024    HGB 10.5 (L) 09/01/2024    HCT 35.2 09/01/2024    MCV 88.0 09/01/2024     09/01/2024       Imaging  No results found.     Telemetry  Reviewed independently. No events.    ASSESSMENT AND PLAN        # Chronic pain -- Patient reports multiple areas of chronic pain with associated point tenderness on exam. She feels this pain is acutely worsened during this hospitalization. Imaging negative. Likely MSK in etiology. Pain has been very difficult to control due to either lack of improvement, intolerable side effects, or allergies. PC and Pain Management following.  -Has no chest pain today  -Complains severely of bilateral lower extremity pain states left leg pain is worse than right  -Will continue gabapentin   - Scheduled outpatient appointment with Pain Management, Dr. Charles on 9/23/2024 at 1:30pm     # Nausea/Vomiting, Ileus, Constipation -- Patient with persistent and worsening nausea/vomiting this week along with progressive constipation. Likely in the setting of heavy opiate use for pain control as above. KUB shows significant stool retention throughout the colon. Non-obstructive bowel gas pattern.  - Scheduled IV zofran  -Added Reglan   - Methylnaltrexone every other day  - S/p 1/2 dose bowel prep on 8/23 and again on 8/24 with good response  - Bisacodyl suppository TID, Mineral oil enema BID     # GEORGE -- Creatinine up to 2.2  - Likely due to diuresis  - Will hold off today     # Dry gangrene of bilateral toes, PAD -- S/p LLE balloon angiogram with Vascular Surgery on  8/14.  - Local wound care  - Continue clopidogrel daily, she is allergic to aspirin  - Continue statin     # R groin hematoma, Acute blood loss anemia -- Suffered as a complication of LLE angiogram for dry gangrene/PAD as above. No active extravasation on CTA. C/b drop in Hb to ~7. FA/B12 wnl. Ferritin 24, %sat 9.  - Clopidogrel resumed on 8/17     # ILD, Pulm HTN -- Chronic on 2L NC.   - Continue home sildenafil, macitentan  -Will hold off diuretic today and start home dose diuretics ethacrynic acid 50 dly    #Scleroderma  - On Tocilizumab as scheduled  - Rheumatology following     # Pulmonary nodule -- S/p EBUS with path showing small focus of atypical glands. -   - Pulm discussed results as abnormal cells likely due to adenocarcinoma and will require surveillance outpatient.    # Bilateral eye pain -- Seen by Optho, drops recommended.       VTE Assessment: Padua    VTE Prophylaxis:  Current anticoagulants:    None      Code Status: Full Code      Estimated Discharge Date: 9/4/2024   Disposition Planning: D/c home pending diuresis      Ba Figueroa MD  9/1/2024

## 2024-09-01 NOTE — PLAN OF CARE
Plan of Care Review  Plan of Care Reviewed With: patient  Progress: no change  Outcome Evaluation: Patient aaox4. VSS throughout shift. Tolerated all meds. Mild pain; patient says that tylenol and heating pad are adeqaute for her pain. Still refusing purewick; incontinent care provided. No BM. FSP in place; call gisella mccollum.

## 2024-09-01 NOTE — PROGRESS NOTES
Cardiology Progress Note    Subjective:  Arielle Felix is a 63 y.o. female who was admitted with volume overload following bronchoscopy.    She has no cardiopulmonary symptoms at present. Specifically, dyspnea, palpitations, or presyncope.  She does have some chest pain that she feels may be related to her sternum or esophagus.  Able to tolerate p.o. intake    She feels that she is completely dry and dehydrated she does not feel like she has any more fluid to go.  She feels a little woozy this morning, blood pressure is mildly elevated    Medications:  Current Facility-Administered Medications   Medication Dose Route Frequency Provider Last Rate Last Admin    acetaminophen (TYLENOL) tablet 975 mg  975 mg oral q6h SPENCER Simon Cano DO   975 mg at 09/01/24 0612    amLODIPine (NORVASC) tablet 10 mg  10 mg oral q AM Simon Cano DO   10 mg at 09/01/24 0824    [Provider Managed Hold] atorvastatin (LIPITOR) tablet 40 mg  40 mg oral Daily (6p) Simon Cano DO   40 mg at 08/22/24 1733    benzocaine-menthol (CEPACOL/CHLORASEPTIC) lozenge 1 lozenge  1 lozenge Mouth/Throat q2h PRN Simon Cano DO   1 lozenge at 08/28/24 0938    benzonatate (TESSALON) capsule 100 mg  100 mg oral 3x daily PRN Simon Cano DO   100 mg at 08/28/24 1344    [Provider Managed Hold] bisacodyL (DULCOLAX) 10 mg suppository 10 mg  10 mg rectal TID Molly Hidalgo CRNP   10 mg at 08/24/24 2015    carboxymethylcellulose (REFRESH PLUS) 0.5 % ophthalmic dropperette 1 drop  1 drop Right Eye 3x daily PRN Simon Cano DO   1 drop at 08/20/24 0817    clopidogreL (PLAVIX) tablet 75 mg  75 mg oral Daily Simon Cano DO   75 mg at 09/01/24 0824    cyanocobalamin (VITAMIN B12) tablet 1,000 mcg  1,000 mcg oral Daily Simon Cano DO   1,000 mcg at 09/01/24 0823    cyclobenzaprine (FLEXERIL) tablet 5 mg  5 mg oral 3x daily PRN Simon Cano  DO Hoa   5 mg at 08/29/24 2048    glucose chewable tablet 16-32 g of dextrose  16-32 g of dextrose oral PRN Simon Cano DO        Or    dextrose 40 % oral gel 15-30 g of dextrose  15-30 g of dextrose oral PRN Simon Cano DO        Or    glucagon (GLUCAGEN) injection 1 mg  1 mg intramuscular PRN Simon Cano DO        Or    dextrose 50 % in water (D50) injection 12.5 g  25 mL intravenous PRN Simon Cano DO        dicyclomine (BENTYL) capsule 10 mg  10 mg oral 4x daily PRN Ritu Peterson DO   10 mg at 08/31/24 1632    famotidine (PEPCID) injection 20 mg  20 mg intravenous Daily PRN Molly Hidalgo CRNP        Or    famotidine (PEPCID) tablet 20 mg  20 mg oral Daily PRN Molly Hidalgo CRNP   20 mg at 08/31/24 0053    gabapentin (NEURONTIN) capsule 200 mg  200 mg oral TID Maddy Cope MD   200 mg at 09/01/24 0823    guaiFENesin (MUCINEX) 12 hr ER tablet 600 mg  600 mg oral BID Molly Hidalgo CRNP   600 mg at 09/01/24 0824    honey (MEDIHONEY) 100 % topical paste   Topical Daily PRN Simon Cano DO   Given at 08/27/24 2316    [Provider Managed Hold] HYDROmorphone (DILAUDID) tablet 2 mg  2 mg oral q4h PRN Molly Hidalgo CRNP   2 mg at 08/27/24 2057    levothyroxine (SYNTHROID) tablet 100 mcg  100 mcg oral Daily (6:30a) Simon Cano DO   100 mcg at 09/01/24 0612    lidocaine (ASPERCREME) 4 % topical patch 1 patch  1 patch Topical Daily Simon Cano DO   1 patch at 09/01/24 0835    lidocaine (ASPERCREME) 4 % topical patch 1 patch  1 patch Topical Daily Smion Cano DO   1 patch at 09/01/24 0835    lidocaine (ASPERCREME) 4 % topical patch 1 patch  1 patch Topical Daily Simon Cano DO   1 patch at 09/01/24 0834    macitentan (OPSUMIT) tablet 10 mg (Patient Owned)  10 mg oral Daily Simon Cano DO   10 mg at 09/01/24 0836    melatonin ODT 3 mg  3 mg oral Nightly PRN Jerome  Simon Camara DO   3 mg at 08/28/24 2321    metoclopramide (REGLAN) tablet 5 mg  5 mg oral 3x daily PRN Ba Figueroa MD   5 mg at 09/01/24 0824    [Provider Managed Hold] mineral oil enema 133 mL  133 mL rectal BID Maddy Cope MD   133 mL at 08/24/24 1243    ondansetron (ZOFRAN) injection 4 mg  4 mg intravenous q6h SPENCER Molly Hidalgo CRNP   4 mg at 09/01/24 0612    pantoprazole (PROTONIX) tablet,delayed release (DR/EC) 40 mg  40 mg oral Daily Molly Hidalgo CRNP   40 mg at 09/01/24 0824    peg 400-hypromellose-glycerin (ARTIFICIAL TEARS) 1-0.2-0.2 % eye drops 1 drop  1 drop Both Eyes q4h while awake Simon Cano DO   1 drop at 09/01/24 0839    phenoL (SORE THROAT SPRAY) 1.4 % mucosal spray 1 spray  1 spray Mouth/Throat q2h PRN Simon Cano DO        senna (SENOKOT) tablet 2 tablet  2 tablet oral BID Maddy Cope MD   2 tablet at 09/01/24 0824    sildenafiL (pulm.hypertension) (REVATIO) tablet 20 mg  20 mg oral TID Simon Cano DO   20 mg at 09/01/24 0824    simethicone (MYLICON) chewable tablet 80 mg  80 mg oral 4x daily PRN Ritu Peterson DO   80 mg at 08/31/24 0600    sodium chloride (OCEAN) 0.65 % nasal spray 2 spray  2 spray Each Nostril Daily Simon Cano DO   2 spray at 09/01/24 0842    white petrolatum (AQUAPHOR) ointment   Topical q4h PRN Simon Cano DO   Given at 08/13/24 2117    white petrolatum (AQUAPHOR) ointment   Topical BID Simon Cano DO   Given at 09/01/24 0003    white petrolatum-mineral oil (ARTIFICIAL TEARS OINT) ophthalmic ointment   Both Eyes Nightly Simon Cano DO   Given at 08/31/24 8416       Review of Systems: A complete review of systems is negative, except for where noted above.    Vital Signs(last 24 hours):  Temp:  [36.6 °C (97.9 °F)-36.9 °C (98.5 °F)] 36.6 °C (97.9 °F)  Heart Rate:  [] 76  Resp:  [16-18] 18  BP: (111-130)/(56-62) 130/57      Intake/Output Summary  (Last 24 hours) at 9/1/2024 0856  Last data filed at 8/31/2024 1730  Gross per 24 hour   Intake 400 ml   Output --   Net 400 ml       WEIGHTS:  Weights (last 7 days)       Date/Time Weight    09/01/24 0631 52.7 kg (116 lb 3.2 oz)    08/31/24 0558 53.9 kg (118 lb 14.4 oz)    08/30/24 0539 54.5 kg (120 lb 0.7 oz)    08/29/24 0658 54.4 kg (119 lb 14.4 oz)    08/28/24 0441 54.9 kg (121 lb)    08/27/24 0539 54.6 kg (120 lb 4.8 oz)    08/26/24 0641 54.6 kg (120 lb 4.8 oz)    08/25/24 0930 55.3 kg (122 lb 0.3 oz)    08/25/24 0535 63.8 kg (140 lb 11.2 oz)            Physical Exam:  No distress, chronically ill, sarcopenia   Anicteric sclera, skin changes consistent with scleroderma  Clear OP, dry mucosa  CTA+P  CV JVP not markedly elevated  Edema none  Soft benign abd +BS, non distended  Alert and appropriate    Labs: Personally reviewed and notable for _____  Results from last 7 days   Lab Units 08/31/24  0929 08/30/24  0913 08/29/24  1126   SODIUM mEQ/L 140 140 142   POTASSIUM mEQ/L 4.6 4.9 4.2   CHLORIDE mEQ/L 104 108* 109*   CO2 mEQ/L 28 27 24   BUN mg/dL 35* 28* 28*   CREATININE mg/dL 1.6* 1.3* 1.3*   CALCIUM mg/dL 9.5 9.0 9.0   GLUCOSE mg/dL 83 76 125*         Results from last 7 days   Lab Units 08/31/24  0929 08/30/24  0913 08/29/24  1126   WBC K/uL 5.49 5.19 8.54   HEMOGLOBIN g/dL 9.5* 9.0* 9.1*   HEMATOCRIT % 30.8* 30.0* 30.2*   PLATELETS K/uL 320 292 284         Cardiovascular Studies:   Telemetry: Sinus rhythm    Assessment and Plan:    Pulmonary arterial hypertension    Close to 4 pounds down from her weight yesterday  I do not think she is massively volume overloaded at this point, renal indices tracked upward and she has had office visits previously where her weight has been around 116 pounds.  Restart home dose ethacrynic acid, discontinue metolazone/diuretic augmentation  Thank you for maintaining potassium greater than 4 magnesium greater than 2  Continue sildenafil and macitentan    Chest  pain    Noncardiac, better with Tums, suspect esophagitis/GERD    Coronary artery disease  Peripheral vascular disease    Continue clopidogrel  Continue atorvastatin    Primary hypertension    Continue amlodipine    Tom Llamas MD  9/1/2024    Patient has 1 or more chronic illnesses with severe exacerbation and progression.  Complex decision making assigned.

## 2024-09-02 ENCOUNTER — APPOINTMENT (INPATIENT)
Dept: RADIOLOGY | Facility: HOSPITAL | Age: 64
DRG: 515 | End: 2024-09-02
Payer: COMMERCIAL

## 2024-09-02 LAB
ALBUMIN SERPL-MCNC: 3.6 G/DL (ref 3.5–5.7)
ALP SERPL-CCNC: 60 IU/L (ref 34–125)
ALT SERPL-CCNC: 11 IU/L (ref 7–52)
ANION GAP SERPL CALC-SCNC: 10 MEQ/L (ref 3–15)
ANION GAP SERPL CALC-SCNC: 11 MEQ/L (ref 3–15)
AST SERPL-CCNC: 16 IU/L (ref 13–39)
ATRIAL RATE: 72
BILIRUB SERPL-MCNC: 0.3 MG/DL (ref 0.3–1.2)
BUN SERPL-MCNC: 46 MG/DL (ref 7–25)
BUN SERPL-MCNC: 48 MG/DL (ref 7–25)
CALCIUM SERPL-MCNC: 9.1 MG/DL (ref 8.6–10.3)
CALCIUM SERPL-MCNC: 9.7 MG/DL (ref 8.6–10.3)
CHLORIDE SERPL-SCNC: 103 MEQ/L (ref 98–107)
CHLORIDE SERPL-SCNC: 104 MEQ/L (ref 98–107)
CO2 SERPL-SCNC: 25 MEQ/L (ref 21–31)
CO2 SERPL-SCNC: 26 MEQ/L (ref 21–31)
CREAT SERPL-MCNC: 1.9 MG/DL (ref 0.6–1.2)
CREAT SERPL-MCNC: 2 MG/DL (ref 0.6–1.2)
EGFRCR SERPLBLD CKD-EPI 2021: 27.6 ML/MIN/1.73M*2
EGFRCR SERPLBLD CKD-EPI 2021: 29.4 ML/MIN/1.73M*2
ERYTHROCYTE [DISTWIDTH] IN BLOOD BY AUTOMATED COUNT: 24.7 % (ref 11.7–14.4)
GLUCOSE BLD-MCNC: 100 MG/DL (ref 70–99)
GLUCOSE SERPL-MCNC: 105 MG/DL (ref 70–99)
GLUCOSE SERPL-MCNC: 115 MG/DL (ref 70–99)
HCT VFR BLD AUTO: 34.8 % (ref 35–45)
HGB BLD-MCNC: 10.4 G/DL (ref 11.8–15.7)
MAGNESIUM SERPL-MCNC: 2.1 MG/DL (ref 1.8–2.5)
MCH RBC QN AUTO: 26.6 PG (ref 28–33.2)
MCHC RBC AUTO-ENTMCNC: 29.9 G/DL (ref 32.2–35.5)
MCV RBC AUTO: 89 FL (ref 83–98)
P AXIS: 14
PDW BLD AUTO: 11 FL (ref 9.4–12.3)
PLATELET # BLD AUTO: 327 K/UL (ref 150–369)
POCT TEST: ABNORMAL
POTASSIUM SERPL-SCNC: 4 MEQ/L (ref 3.5–5.1)
POTASSIUM SERPL-SCNC: 4.2 MEQ/L (ref 3.5–5.1)
PR INTERVAL: 158
PROT SERPL-MCNC: 6.8 G/DL (ref 6–8.2)
QRS DURATION: 72
QT INTERVAL: 432
QTC CALCULATION(BAZETT): 473
R AXIS: -1
RBC # BLD AUTO: 3.91 M/UL (ref 3.93–5.22)
SODIUM SERPL-SCNC: 139 MEQ/L (ref 136–145)
SODIUM SERPL-SCNC: 140 MEQ/L (ref 136–145)
T WAVE AXIS: 105
TROPONIN I SERPL HS-MCNC: 41.1 PG/ML
VENTRICULAR RATE: 72
WBC # BLD AUTO: 5.13 K/UL (ref 3.8–10.5)

## 2024-09-02 PROCEDURE — 63700000 HC SELF-ADMINISTRABLE DRUG: Performed by: INTERNAL MEDICINE

## 2024-09-02 PROCEDURE — 63700000 HC SELF-ADMINISTRABLE DRUG: Performed by: STUDENT IN AN ORGANIZED HEALTH CARE EDUCATION/TRAINING PROGRAM

## 2024-09-02 PROCEDURE — 63700000 HC SELF-ADMINISTRABLE DRUG

## 2024-09-02 PROCEDURE — 80048 BASIC METABOLIC PNL TOTAL CA: CPT | Performed by: INTERNAL MEDICINE

## 2024-09-02 PROCEDURE — 25000000 HC PHARMACY GENERAL: Performed by: NURSE PRACTITIONER

## 2024-09-02 PROCEDURE — 99233 SBSQ HOSP IP/OBS HIGH 50: CPT | Performed by: INTERNAL MEDICINE

## 2024-09-02 PROCEDURE — 85025 COMPLETE CBC W/AUTO DIFF WBC: CPT

## 2024-09-02 PROCEDURE — 36415 COLL VENOUS BLD VENIPUNCTURE: CPT | Performed by: INTERNAL MEDICINE

## 2024-09-02 PROCEDURE — 85027 COMPLETE CBC AUTOMATED: CPT | Performed by: INTERNAL MEDICINE

## 2024-09-02 PROCEDURE — 63600000 HC DRUGS/DETAIL CODE: Mod: JZ

## 2024-09-02 PROCEDURE — 93005 ELECTROCARDIOGRAM TRACING: CPT

## 2024-09-02 PROCEDURE — 93005 ELECTROCARDIOGRAM TRACING: CPT | Performed by: NURSE PRACTITIONER

## 2024-09-02 PROCEDURE — 71045 X-RAY EXAM CHEST 1 VIEW: CPT

## 2024-09-02 PROCEDURE — 83735 ASSAY OF MAGNESIUM: CPT | Performed by: INTERNAL MEDICINE

## 2024-09-02 PROCEDURE — 93005 ELECTROCARDIOGRAM TRACING: CPT | Performed by: HOSPITALIST

## 2024-09-02 PROCEDURE — 63700000 HC SELF-ADMINISTRABLE DRUG: Performed by: NURSE PRACTITIONER

## 2024-09-02 PROCEDURE — 21400000 HC ROOM AND CARE CCU/INTERMEDIATE

## 2024-09-02 PROCEDURE — 99291 CRITICAL CARE FIRST HOUR: CPT | Performed by: STUDENT IN AN ORGANIZED HEALTH CARE EDUCATION/TRAINING PROGRAM

## 2024-09-02 PROCEDURE — 63600000 HC DRUGS/DETAIL CODE: Mod: JZ | Performed by: NURSE PRACTITIONER

## 2024-09-02 PROCEDURE — 80053 COMPREHEN METABOLIC PANEL: CPT

## 2024-09-02 PROCEDURE — 83880 ASSAY OF NATRIURETIC PEPTIDE: CPT

## 2024-09-02 PROCEDURE — 93010 ELECTROCARDIOGRAM REPORT: CPT | Performed by: INTERNAL MEDICINE

## 2024-09-02 PROCEDURE — 84484 ASSAY OF TROPONIN QUANT: CPT

## 2024-09-02 RX ORDER — ALUMINUM HYDROXIDE, MAGNESIUM HYDROXIDE, AND SIMETHICONE 1200; 120; 1200 MG/30ML; MG/30ML; MG/30ML
30 SUSPENSION ORAL ONCE
Status: COMPLETED | OUTPATIENT
Start: 2024-09-02 | End: 2024-09-03

## 2024-09-02 RX ORDER — GABAPENTIN 100 MG/1
100 CAPSULE ORAL ONCE
Status: COMPLETED | OUTPATIENT
Start: 2024-09-02 | End: 2024-09-02

## 2024-09-02 RX ORDER — LORAZEPAM 2 MG/ML
0.5 INJECTION INTRAMUSCULAR ONCE
Status: COMPLETED | OUTPATIENT
Start: 2024-09-02 | End: 2024-09-02

## 2024-09-02 RX ORDER — ONDANSETRON HYDROCHLORIDE 2 MG/ML
4 INJECTION, SOLUTION INTRAVENOUS EVERY 6 HOURS PRN
Status: DISCONTINUED | OUTPATIENT
Start: 2024-09-02 | End: 2024-09-03 | Stop reason: HOSPADM

## 2024-09-02 RX ORDER — HYDROMORPHONE HYDROCHLORIDE 1 MG/ML
0.25 INJECTION, SOLUTION INTRAMUSCULAR; INTRAVENOUS; SUBCUTANEOUS ONCE
Status: DISCONTINUED | OUTPATIENT
Start: 2024-09-02 | End: 2024-09-02

## 2024-09-02 RX ADMIN — GABAPENTIN 100 MG: 100 CAPSULE ORAL at 22:48

## 2024-09-02 RX ADMIN — ONDANSETRON 4 MG: 2 INJECTION INTRAMUSCULAR; INTRAVENOUS at 17:03

## 2024-09-02 RX ADMIN — ACETAMINOPHEN 975 MG: 325 TABLET ORAL at 23:07

## 2024-09-02 RX ADMIN — LIDOCAINE 4% 1 PATCH: 40 PATCH TOPICAL at 09:04

## 2024-09-02 RX ADMIN — ACETAMINOPHEN 975 MG: 325 TABLET ORAL at 11:49

## 2024-09-02 RX ADMIN — GLYCERIN 1 DROP: .002; .002; .01 SOLUTION/ DROPS OPHTHALMIC at 14:36

## 2024-09-02 RX ADMIN — PANTOPRAZOLE SODIUM 40 MG: 40 TABLET, DELAYED RELEASE ORAL at 09:03

## 2024-09-02 RX ADMIN — CYCLOBENZAPRINE HYDROCHLORIDE 5 MG: 5 TABLET, FILM COATED ORAL at 22:39

## 2024-09-02 RX ADMIN — SILDENAFIL 20 MG: 20 TABLET, FILM COATED ORAL at 09:04

## 2024-09-02 RX ADMIN — SALINE NASAL SPRAY 2 SPRAY: 1.5 SOLUTION NASAL at 09:02

## 2024-09-02 RX ADMIN — CYANOCOBALAMIN TAB 1000 MCG 1000 MCG: 1000 TAB at 09:03

## 2024-09-02 RX ADMIN — GUAIFENESIN 600 MG: 600 TABLET ORAL at 20:53

## 2024-09-02 RX ADMIN — LEVOTHYROXINE SODIUM 100 MCG: 0.1 TABLET ORAL at 05:09

## 2024-09-02 RX ADMIN — WHITE PETROLATUM: 1.75 OINTMENT TOPICAL at 09:02

## 2024-09-02 RX ADMIN — GLYCERIN 1 DROP: .002; .002; .01 SOLUTION/ DROPS OPHTHALMIC at 20:56

## 2024-09-02 RX ADMIN — CLOPIDOGREL 75 MG: 75 TABLET ORAL at 09:03

## 2024-09-02 RX ADMIN — LIDOCAINE 4% 1 PATCH: 40 PATCH TOPICAL at 09:03

## 2024-09-02 RX ADMIN — AMLODIPINE BESYLATE 10 MG: 10 TABLET ORAL at 09:03

## 2024-09-02 RX ADMIN — ACETAMINOPHEN 975 MG: 325 TABLET ORAL at 05:09

## 2024-09-02 RX ADMIN — METOCLOPRAMIDE 5 MG: 10 TABLET ORAL at 14:36

## 2024-09-02 RX ADMIN — SILDENAFIL 20 MG: 20 TABLET, FILM COATED ORAL at 14:32

## 2024-09-02 RX ADMIN — GABAPENTIN 200 MG: 100 CAPSULE ORAL at 20:53

## 2024-09-02 RX ADMIN — LORAZEPAM 0.5 MG: 2 INJECTION INTRAMUSCULAR; INTRAVENOUS at 22:48

## 2024-09-02 RX ADMIN — GLYCERIN 1 DROP: .002; .002; .01 SOLUTION/ DROPS OPHTHALMIC at 16:45

## 2024-09-02 RX ADMIN — GLYCERIN 1 DROP: .002; .002; .01 SOLUTION/ DROPS OPHTHALMIC at 05:23

## 2024-09-02 RX ADMIN — GABAPENTIN 200 MG: 100 CAPSULE ORAL at 09:03

## 2024-09-02 RX ADMIN — ONDANSETRON 4 MG: 2 INJECTION INTRAMUSCULAR; INTRAVENOUS at 11:02

## 2024-09-02 RX ADMIN — GLYCERIN 1 DROP: .002; .002; .01 SOLUTION/ DROPS OPHTHALMIC at 09:02

## 2024-09-02 RX ADMIN — MINERAL OIL, WHITE PETROLATUM: .03; .94 OINTMENT OPHTHALMIC at 20:56

## 2024-09-02 RX ADMIN — SENNOSIDES 2 TABLET: 8.6 TABLET, FILM COATED ORAL at 09:03

## 2024-09-02 RX ADMIN — SILDENAFIL 20 MG: 20 TABLET, FILM COATED ORAL at 20:53

## 2024-09-02 RX ADMIN — ONDANSETRON 4 MG: 2 INJECTION INTRAMUSCULAR; INTRAVENOUS at 05:10

## 2024-09-02 RX ADMIN — WHITE PETROLATUM: 1.75 OINTMENT TOPICAL at 20:56

## 2024-09-02 RX ADMIN — FAMOTIDINE 20 MG: 10 INJECTION, SOLUTION INTRAVENOUS at 05:12

## 2024-09-02 RX ADMIN — GABAPENTIN 200 MG: 100 CAPSULE ORAL at 14:32

## 2024-09-02 RX ADMIN — SENNOSIDES 2 TABLET: 8.6 TABLET, FILM COATED ORAL at 20:53

## 2024-09-02 RX ADMIN — GUAIFENESIN 600 MG: 600 TABLET ORAL at 09:03

## 2024-09-02 ASSESSMENT — COGNITIVE AND FUNCTIONAL STATUS - GENERAL
WALKING IN HOSPITAL ROOM: 3 - A LITTLE
WALKING IN HOSPITAL ROOM: 3 - A LITTLE
MOVING TO AND FROM BED TO CHAIR: 4 - NONE
MOVING TO AND FROM BED TO CHAIR: 3 - A LITTLE
STANDING UP FROM CHAIR USING ARMS: 3 - A LITTLE
CLIMB 3 TO 5 STEPS WITH RAILING: 3 - A LITTLE
CLIMB 3 TO 5 STEPS WITH RAILING: 3 - A LITTLE
STANDING UP FROM CHAIR USING ARMS: 3 - A LITTLE

## 2024-09-02 NOTE — PLAN OF CARE
Plan of Care Review  Plan of Care Reviewed With: patient  Progress: improving  Outcome Evaluation: patient aox4, vss, remains on chronic 2l nc. diurectics adjusted per cards due to valentine. continues with persistent nausea despite scheduled meds. incontinence care provided with brief. fsp maintained and call bell within reach.

## 2024-09-02 NOTE — PROGRESS NOTES
Cardiology Progress Note    Subjective:  Arielle Felix is a 63 y.o. female who was admitted with volume overload following bronchoscopy.    Vomited 2-3 times yesterday, a couple of episodes had coffee-ground emesis  Continues to feel dry, dehydrated.  No chest pain or palpitations or breathlessness.      Medications:  Current Facility-Administered Medications   Medication Dose Route Frequency Provider Last Rate Last Admin    acetaminophen (TYLENOL) tablet 975 mg  975 mg oral q6h SPENCER Simon Cano DO   975 mg at 09/02/24 0509    amLODIPine (NORVASC) tablet 10 mg  10 mg oral q AM Simon Cano DO   10 mg at 09/01/24 0824    [Provider Managed Hold] atorvastatin (LIPITOR) tablet 40 mg  40 mg oral Daily (6p) Simon Cano DO   40 mg at 08/22/24 1733    benzocaine-menthol (CEPACOL/CHLORASEPTIC) lozenge 1 lozenge  1 lozenge Mouth/Throat q2h PRN Simon Cano DO   1 lozenge at 08/28/24 0938    benzonatate (TESSALON) capsule 100 mg  100 mg oral 3x daily PRN Simon Cano DO   100 mg at 08/28/24 1344    [Provider Managed Hold] bisacodyL (DULCOLAX) 10 mg suppository 10 mg  10 mg rectal TID Molly Hidalgo CRNP   10 mg at 08/24/24 2015    carboxymethylcellulose (REFRESH PLUS) 0.5 % ophthalmic dropperette 1 drop  1 drop Right Eye 3x daily PRN Simon Cano DO   1 drop at 08/20/24 0817    clopidogreL (PLAVIX) tablet 75 mg  75 mg oral Daily Simon Cano DO   75 mg at 09/01/24 0824    cyanocobalamin (VITAMIN B12) tablet 1,000 mcg  1,000 mcg oral Daily Simon Cano, DO   1,000 mcg at 09/01/24 0823    cyclobenzaprine (FLEXERIL) tablet 5 mg  5 mg oral 3x daily PRN Simon Cano DO   5 mg at 08/29/24 2048    glucose chewable tablet 16-32 g of dextrose  16-32 g of dextrose oral PRN Simon Cano DO        Or    dextrose 40 % oral gel 15-30 g of dextrose  15-30 g of dextrose oral PRN Jerome,  Simon Camara DO        Or    glucagon (GLUCAGEN) injection 1 mg  1 mg intramuscular PRN Simon Cano DO        Or    dextrose 50 % in water (D50) injection 12.5 g  25 mL intravenous PRN Simon Cano DO        dicyclomine (BENTYL) capsule 10 mg  10 mg oral 4x daily PRN Ritu Peterson DO   10 mg at 08/31/24 1632    ethacrynic acid (EDECRIN) tablet 50 mg  50 mg oral Daily Ba Figueroa MD        famotidine (PEPCID) injection 20 mg  20 mg intravenous Daily PRN Molly Hidalgo CRNP   20 mg at 09/02/24 0512    Or    famotidine (PEPCID) tablet 20 mg  20 mg oral Daily PRN Molly Hidalgo CRNP   20 mg at 08/31/24 0053    gabapentin (NEURONTIN) capsule 200 mg  200 mg oral TID Maddy Cope MD   200 mg at 09/01/24 2032    guaiFENesin (MUCINEX) 12 hr ER tablet 600 mg  600 mg oral BID Molly Hidalgo CRNP   600 mg at 09/01/24 2032    honey (MEDIHONEY) 100 % topical paste   Topical Daily PRN Simon Cano DO   Given at 08/27/24 2316    [Provider Managed Hold] HYDROmorphone (DILAUDID) tablet 2 mg  2 mg oral q4h PRN Molly Hidalgo CRNP   2 mg at 08/27/24 2057    levothyroxine (SYNTHROID) tablet 100 mcg  100 mcg oral Daily (6:30a) Simon Cano DO   100 mcg at 09/02/24 0509    lidocaine (ASPERCREME) 4 % topical patch 1 patch  1 patch Topical Daily Simon Cano DO   1 patch at 09/01/24 0835    lidocaine (ASPERCREME) 4 % topical patch 1 patch  1 patch Topical Daily Simon Cano DO   1 patch at 09/01/24 0835    lidocaine (ASPERCREME) 4 % topical patch 1 patch  1 patch Topical Daily Simon Cano DO   1 patch at 09/01/24 0834    macitentan (OPSUMIT) tablet 10 mg (Patient Owned)  10 mg oral Daily Simon Cano DO   10 mg at 09/01/24 0836    melatonin ODT 3 mg  3 mg oral Nightly PRN Simon Cano DO   3 mg at 08/28/24 2321    metoclopramide (REGLAN) tablet 5 mg  5 mg oral 3x daily PRN Ba Figueroa MD   5  mg at 09/01/24 0824    [Provider Managed Hold] mineral oil enema 133 mL  133 mL rectal BID Maddy Cope MD   133 mL at 08/24/24 1243    ondansetron (ZOFRAN) injection 4 mg  4 mg intravenous q6h SPENCER Molly Hidalgo CRNP   4 mg at 09/02/24 0510    pantoprazole (PROTONIX) tablet,delayed release (DR/EC) 40 mg  40 mg oral Daily Molly Hidalgo CRNP   40 mg at 09/01/24 0824    peg 400-hypromellose-glycerin (ARTIFICIAL TEARS) 1-0.2-0.2 % eye drops 1 drop  1 drop Both Eyes q4h while awake Simon Cano DO   1 drop at 09/02/24 0523    phenoL (SORE THROAT SPRAY) 1.4 % mucosal spray 1 spray  1 spray Mouth/Throat q2h PRN Simon Cano DO        senna (SENOKOT) tablet 2 tablet  2 tablet oral BID Maddy Cope MD   2 tablet at 09/01/24 0824    sildenafiL (pulm.hypertension) (REVATIO) tablet 20 mg  20 mg oral TID Simon Cano DO   20 mg at 09/01/24 2032    simethicone (MYLICON) chewable tablet 80 mg  80 mg oral 4x daily PRN Ritu Peterson DO   80 mg at 08/31/24 0600    sodium chloride (OCEAN) 0.65 % nasal spray 2 spray  2 spray Each Nostril Daily Simon Cano DO   2 spray at 09/01/24 0842    white petrolatum (AQUAPHOR) ointment   Topical q4h PRN Simon Cano DO   Given at 08/13/24 2117    white petrolatum (AQUAPHOR) ointment   Topical BID Simon Cano DO   Given at 09/01/24 2035    white petrolatum-mineral oil (ARTIFICIAL TEARS OINT) ophthalmic ointment   Both Eyes Nightly Simon Cano DO   Given at 08/31/24 2226       Review of Systems: A complete review of systems is negative, except for where noted above.    Vital Signs(last 24 hours):  Temp:  [36.4 °C (97.6 °F)-36.7 °C (98 °F)] 36.7 °C (98 °F)  Heart Rate:  [] 79  Resp:  [16-18] 18  BP: (118-126)/(56-61) 126/59      Intake/Output Summary (Last 24 hours) at 9/2/2024 0837  Last data filed at 9/2/2024 0523  Gross per 24 hour   Intake 1086 ml   Output --   Net 1086 ml        WEIGHTS:  Weights (last 7 days)       Date/Time Weight    09/02/24 0522 52.7 kg (116 lb 3.2 oz)    09/01/24 0631 52.7 kg (116 lb 3.2 oz)    08/31/24 0558 53.9 kg (118 lb 14.4 oz)    08/30/24 0539 54.5 kg (120 lb 0.7 oz)    08/29/24 0658 54.4 kg (119 lb 14.4 oz)    08/28/24 0441 54.9 kg (121 lb)    08/27/24 0539 54.6 kg (120 lb 4.8 oz)    08/26/24 0641 54.6 kg (120 lb 4.8 oz)            Physical Exam:  No distress, chronically ill, sarcopenia   Anicteric sclera, skin changes consistent with scleroderma  Clear OP, dry mucosa  CTA+P  CV JVP appears flat on my exam  Edema none  Soft benign abd +BS, non distended  Alert and appropriate    Labs: Personally reviewed and notable for _____  Results from last 7 days   Lab Units 09/01/24  0940 08/31/24  0929 08/30/24  0913   SODIUM mEQ/L 140 140 140   POTASSIUM mEQ/L 4.0 4.6 4.9   CHLORIDE mEQ/L 103 104 108*   CO2 mEQ/L 26 28 27   BUN mg/dL 41* 35* 28*   CREATININE mg/dL 2.2* 1.6* 1.3*   CALCIUM mg/dL 9.8 9.5 9.0   GLUCOSE mg/dL 109* 83 76         Results from last 7 days   Lab Units 09/01/24  0940 08/31/24  0929 08/30/24  0913   WBC K/uL 5.15 5.49 5.19   HEMOGLOBIN g/dL 10.5* 9.5* 9.0*   HEMATOCRIT % 35.2 30.8* 30.0*   PLATELETS K/uL 341 320 292         Cardiovascular Studies:   Telemetry: Sinus rhythm    Assessment and Plan:    Vomiting with coffee-ground emesis    I have notified primary team, consider switching to IV PPI, GI cocktail and consultation of GI team.  Hemoglobin appears stable.    Pulmonary arterial hypertension  Acute on chronic renal failure due to cardiorenal interaction    Close to 4 pounds down from her weight yesterday  I do not think she is massively volume overloaded at this point, renal indices tracked upward and she has had office visits previously where her weight has been around 116 pounds.  Hold dose of ethacrynic acid today, anticipate restart tomorrow, discontinue metolazone/diuretic augmentation  Thank you for maintaining potassium greater  than 4 magnesium greater than 2  Continue sildenafil and macitentan    Chest pain    Noncardiac, better with Tums, suspect esophagitis/GERD    Coronary artery disease  Peripheral vascular disease    Continue clopidogrel  Continue atorvastatin    Primary hypertension    Continue amlodipine    Tom Llamas MD  9/2/2024    Patient has 1 or more chronic illnesses with severe exacerbation and progression.  Complex decision making assigned.

## 2024-09-02 NOTE — PLAN OF CARE
Plan of Care Review  Outcome Evaluation: Patient stable t/o shift, AAOx4, meds given and tolerated. No bm this shift. Incontinent care provided. FSP in place.

## 2024-09-02 NOTE — PROGRESS NOTES
Hospital Medicine Service -  Daily Progress Note       SUBJECTIVE   Interval History: Patient seen and examined at bedside, she had no acute events overnight.  She reports 3 episodes of coffee-ground emesis, but otherwise has no other complaints.  She still is dehydrated but denies any chest pain, shortness of breath   OBJECTIVE      Vital signs in last 24 hours:  Temp:  [36.4 °C (97.6 °F)-36.7 °C (98 °F)] 36.6 °C (97.9 °F)  Heart Rate:  [] 98  Resp:  [16-18] 18  BP: (118-126)/(56-61) 124/60    Intake/Output Summary (Last 24 hours) at 9/2/2024 1214  Last data filed at 9/2/2024 1145  Gross per 24 hour   Intake 1559 ml   Output --   Net 1559 ml       PHYSICAL EXAMINATION        Physical Exam  General:chronically ill looking, on 2l n/c  HEENT: Symmetric, PERRL/EOMI, moist membranes, NCAT  Cardiovascular: Regular rate and rhythm, normal S1/S2, no murmurs, rubs, gallops, no JVD  Pulmonary: Clear to auscultation bilaterally, no wheezing, rhonchi  GI: Soft, nontender, nondistended, bowel sounds normal, no organomegaly  Musculoskeletal: Normal bulk and tone, normal ROM  SKIN: scarring, hyper/hypopigmentation on face, scalp, upper and lower extremities, has missing digits in in bilateral hand fingers, and gangrene toes bilateral LE  Extremities: Distal pulses intact, No LE edema bilaterally  Neuro: CN2-12 intact, sensation intact, with no motor deficits     LINES, CATHETERS, DRAINS, AIRWAYS, AND WOUNDS   Lines, Drains, and Airways:   Peripheral IV (Adult) 08/21/24 Anterior;Upper;Left Arm (Active)   Number of days: 4       Wound Anterior;Right Toe (2nd) (Active)   Number of days: 17       Wound Anterior;Left Toe (Great) (Active)   Number of days: 17       Wound Anterior;Left Toe (2nd) (Active)   Number of days: 17       Wound Arterial Ulcer Anterior;Right Ankle (Active)   Number of days: 5       Wound Arterial Ulcer Anterior;Left Ankle (Active)   Number of days: 5       Wound Anterior;Left Toe (5th) (Active)    Number of days: 5       Surgical Incision Groin Right (Active)   Number of days: 11       Wounds (agree with documentation and present on admission)     LABS / IMAGING / TELE      Labs  Lab Results   Component Value Date    GLUCOSE 115 (H) 09/02/2024    CALCIUM 9.7 09/02/2024     09/02/2024    K 4.0 09/02/2024    CO2 26 09/02/2024     09/02/2024    BUN 48 (H) 09/02/2024    CREATININE 2.0 (H) 09/02/2024       Lab Results   Component Value Date    WBC 5.13 09/02/2024    HGB 10.4 (L) 09/02/2024    HCT 34.8 (L) 09/02/2024    MCV 89.0 09/02/2024     09/02/2024       Imaging  No results found.     Telemetry  Reviewed independently. No events.    ASSESSMENT AND PLAN        # Chronic pain -- Patient reports multiple areas of chronic pain with associated point tenderness on exam. She feels this pain is acutely worsened during this hospitalization. Imaging negative. Likely MSK in etiology. Pain has been very difficult to control due to either lack of improvement, intolerable side effects, or allergies. PC and Pain Management following.  -Has no chest pain today  -Complains severely of bilateral lower extremity pain states left leg pain is worse than right  -Will continue gabapentin   - Scheduled outpatient appointment with Pain Management, Dr. Charles on 9/23/2024 at 1:30pm     # Nausea/Vomiting, Ileus, Constipation -- Patient with persistent and worsening nausea/vomiting this week along with progressive constipation. Likely in the setting of heavy opiate use for pain control as above. KUB shows significant stool retention throughout the colon. Non-obstructive bowel gas pattern.  - Scheduled IV zofran  -Added Reglan   - Methylnaltrexone every other day  - S/p 1/2 dose bowel prep on 8/23 and again on 8/24 with good response  - Bisacodyl suppository TID, Mineral oil enema BID     # GEORGE -- Creatinine up to 2.0  - Likely due to diuresis  - Will hold off today  - Can resume gently tomorrow     # Dry gangrene  of bilateral toes, PAD -- S/p LLE balloon angiogram with Vascular Surgery on 8/14.  - Local wound care  - Continue clopidogrel daily, she is allergic to aspirin  - Continue statin     # R groin hematoma, Acute blood loss anemia -- Suffered as a complication of LLE angiogram for dry gangrene/PAD as above. No active extravasation on CTA. C/b drop in Hb to ~7. FA/B12 wnl. Ferritin 24, %sat 9.  - Clopidogrel resumed on 8/17     # ILD, Pulm HTN -- Chronic on 2L NC.   - Continue home sildenafil, macitentan  -Will hold off diuretic today and start home dose diuretics ethacrynic acid 50 dly    #Scleroderma  - On Tocilizumab as scheduled  - Rheumatology following     # Pulmonary nodule -- S/p EBUS with path showing small focus of atypical glands. -   - Pulm discussed results as abnormal cells likely due to adenocarcinoma and will require surveillance outpatient.    # Bilateral eye pain -- Seen by Optho, drops recommended.       VTE Assessment: Padua    VTE Prophylaxis:  Current anticoagulants:    None      Code Status: Full Code      Estimated Discharge Date: 9/4/2024   Disposition Planning: D/c home pending diuresis      Ba Figueroa MD  9/2/2024

## 2024-09-02 NOTE — PLAN OF CARE
Plan of Care Review  Outcome Evaluation: Patient recieved aaoxs4 c/o nv all meds administered . Patient had small bm , fall precautions maintianed

## 2024-09-03 ENCOUNTER — TELEPHONE (OUTPATIENT)
Dept: VASCULAR SURGERY | Facility: CLINIC | Age: 64
End: 2024-09-03
Payer: COMMERCIAL

## 2024-09-03 VITALS
RESPIRATION RATE: 16 BRPM | TEMPERATURE: 97.1 F | HEART RATE: 91 BPM | WEIGHT: 116.5 LBS | OXYGEN SATURATION: 92 % | BODY MASS INDEX: 18.72 KG/M2 | DIASTOLIC BLOOD PRESSURE: 79 MMHG | SYSTOLIC BLOOD PRESSURE: 120 MMHG | HEIGHT: 66 IN

## 2024-09-03 LAB
ANISOCYTOSIS BLD QL SMEAR: ABNORMAL
ATRIAL RATE: 72
ATRIAL RATE: 83
BASOPHILS # BLD: 0 K/UL (ref 0.01–0.1)
BASOPHILS NFR BLD: 0 %
BNP SERPL-MCNC: 80 PG/ML
DIFFERENTIAL METHOD BLD: ABNORMAL
EOSINOPHIL # BLD: 0.38 K/UL (ref 0.04–0.36)
EOSINOPHIL NFR BLD: 7 %
ERYTHROCYTE [DISTWIDTH] IN BLOOD BY AUTOMATED COUNT: 24.1 % (ref 11.7–14.4)
ERYTHROCYTE [DISTWIDTH] IN BLOOD BY AUTOMATED COUNT: 24.7 % (ref 11.7–14.4)
HCT VFR BLD AUTO: 31.6 % (ref 35–45)
HCT VFR BLD AUTO: 32.4 % (ref 35–45)
HGB BLD-MCNC: 9.5 G/DL (ref 11.8–15.7)
HGB BLD-MCNC: 9.5 G/DL (ref 11.8–15.7)
HYPOCHROMIA BLD QL SMEAR: ABNORMAL
LYMPHOCYTES # BLD: 1.15 K/UL (ref 1.2–3.5)
LYMPHOCYTES NFR BLD: 21 %
MAGNESIUM SERPL-MCNC: 2.2 MG/DL (ref 1.8–2.5)
MCH RBC QN AUTO: 26.5 PG (ref 28–33.2)
MCH RBC QN AUTO: 26.8 PG (ref 28–33.2)
MCHC RBC AUTO-ENTMCNC: 29.3 G/DL (ref 32.2–35.5)
MCHC RBC AUTO-ENTMCNC: 30.1 G/DL (ref 32.2–35.5)
MCV RBC AUTO: 89.3 FL (ref 83–98)
MCV RBC AUTO: 90.5 FL (ref 83–98)
MONOCYTES # BLD: 0.44 K/UL (ref 0.28–0.8)
MONOCYTES NFR BLD: 8 %
NEUTS BAND # BLD: 0.11 K/UL (ref 0–0.53)
NEUTS BAND # BLD: 3.39 K/UL (ref 1.7–7)
NEUTS BAND NFR BLD: 2 %
NEUTS SEG NFR BLD: 62 %
OVALOCYTES BLD QL SMEAR: ABNORMAL
P AXIS: 52
P AXIS: 56
PDW BLD AUTO: 10.4 FL (ref 9.4–12.3)
PDW BLD AUTO: 10.9 FL (ref 9.4–12.3)
PLAT MORPH BLD: NORMAL
PLATELET # BLD AUTO: 290 K/UL (ref 150–369)
PLATELET # BLD AUTO: 298 K/UL (ref 150–369)
PLATELET # BLD EST: ABNORMAL 10*3/UL
PR INTERVAL: 142
PR INTERVAL: 144
QRS DURATION: 80
QRS DURATION: 82
QT INTERVAL: 354
QT INTERVAL: 444
QTC CALCULATION(BAZETT): 415
QTC CALCULATION(BAZETT): 486
R AXIS: -2
R AXIS: 5
RBC # BLD AUTO: 3.54 M/UL (ref 3.93–5.22)
RBC # BLD AUTO: 3.58 M/UL (ref 3.93–5.22)
T WAVE AXIS: 83
T WAVE AXIS: 97
TROPONIN I SERPL HS-MCNC: 41.6 PG/ML
VENTRICULAR RATE: 72
VENTRICULAR RATE: 83
WBC # BLD AUTO: 4.2 K/UL (ref 3.8–10.5)
WBC # BLD AUTO: 5.46 K/UL (ref 3.8–10.5)

## 2024-09-03 PROCEDURE — 36415 COLL VENOUS BLD VENIPUNCTURE: CPT

## 2024-09-03 PROCEDURE — 63700000 HC SELF-ADMINISTRABLE DRUG: Performed by: HOSPITALIST

## 2024-09-03 PROCEDURE — 99239 HOSP IP/OBS DSCHRG MGMT >30: CPT | Performed by: INTERNAL MEDICINE

## 2024-09-03 PROCEDURE — 63700000 HC SELF-ADMINISTRABLE DRUG: Performed by: NURSE PRACTITIONER

## 2024-09-03 PROCEDURE — 93010 ELECTROCARDIOGRAM REPORT: CPT | Mod: 77 | Performed by: INTERNAL MEDICINE

## 2024-09-03 PROCEDURE — 63700000 HC SELF-ADMINISTRABLE DRUG: Performed by: INTERNAL MEDICINE

## 2024-09-03 PROCEDURE — 63700000 HC SELF-ADMINISTRABLE DRUG: Performed by: STUDENT IN AN ORGANIZED HEALTH CARE EDUCATION/TRAINING PROGRAM

## 2024-09-03 PROCEDURE — 99233 SBSQ HOSP IP/OBS HIGH 50: CPT | Performed by: NURSE PRACTITIONER

## 2024-09-03 PROCEDURE — 85027 COMPLETE CBC AUTOMATED: CPT | Performed by: INTERNAL MEDICINE

## 2024-09-03 PROCEDURE — 93005 ELECTROCARDIOGRAM TRACING: CPT | Performed by: NURSE PRACTITIONER

## 2024-09-03 PROCEDURE — 83735 ASSAY OF MAGNESIUM: CPT | Performed by: INTERNAL MEDICINE

## 2024-09-03 PROCEDURE — 99232 SBSQ HOSP IP/OBS MODERATE 35: CPT | Performed by: INTERNAL MEDICINE

## 2024-09-03 PROCEDURE — 93010 ELECTROCARDIOGRAM REPORT: CPT | Performed by: INTERNAL MEDICINE

## 2024-09-03 PROCEDURE — 84484 ASSAY OF TROPONIN QUANT: CPT

## 2024-09-03 RX ORDER — PANTOPRAZOLE SODIUM 40 MG/1
40 TABLET, DELAYED RELEASE ORAL DAILY
Qty: 30 TABLET | Refills: 0 | Status: CANCELLED | OUTPATIENT
Start: 2024-09-04 | End: 2024-10-04

## 2024-09-03 RX ORDER — GABAPENTIN 100 MG/1
200 CAPSULE ORAL 3 TIMES DAILY
Qty: 180 CAPSULE | Refills: 0 | Status: SHIPPED | OUTPATIENT
Start: 2024-09-03 | End: 2024-11-30 | Stop reason: HOSPADM

## 2024-09-03 RX ORDER — ETHACRYNIC ACID 25 MG/1
50 TABLET ORAL DAILY
Qty: 60 TABLET | Refills: 0 | Status: SHIPPED | OUTPATIENT
Start: 2024-09-04 | End: 2024-10-04

## 2024-09-03 RX ORDER — PANTOPRAZOLE SODIUM 40 MG/1
40 TABLET, DELAYED RELEASE ORAL DAILY
Qty: 30 TABLET | Refills: 0 | Status: SHIPPED | OUTPATIENT
Start: 2024-09-04 | End: 2024-11-19

## 2024-09-03 RX ADMIN — SENNOSIDES 2 TABLET: 8.6 TABLET, FILM COATED ORAL at 09:31

## 2024-09-03 RX ADMIN — LIDOCAINE 4% 1 PATCH: 40 PATCH TOPICAL at 09:31

## 2024-09-03 RX ADMIN — CYANOCOBALAMIN TAB 1000 MCG 1000 MCG: 1000 TAB at 09:30

## 2024-09-03 RX ADMIN — PANTOPRAZOLE SODIUM 40 MG: 40 TABLET, DELAYED RELEASE ORAL at 09:30

## 2024-09-03 RX ADMIN — CLOPIDOGREL 75 MG: 75 TABLET ORAL at 09:31

## 2024-09-03 RX ADMIN — AMLODIPINE BESYLATE 10 MG: 10 TABLET ORAL at 09:31

## 2024-09-03 RX ADMIN — GABAPENTIN 200 MG: 100 CAPSULE ORAL at 13:31

## 2024-09-03 RX ADMIN — GUAIFENESIN 600 MG: 600 TABLET ORAL at 09:31

## 2024-09-03 RX ADMIN — ACETAMINOPHEN 975 MG: 325 TABLET ORAL at 12:14

## 2024-09-03 RX ADMIN — GABAPENTIN 200 MG: 100 CAPSULE ORAL at 09:30

## 2024-09-03 RX ADMIN — GLYCERIN 1 DROP: .002; .002; .01 SOLUTION/ DROPS OPHTHALMIC at 09:33

## 2024-09-03 RX ADMIN — SILDENAFIL 20 MG: 20 TABLET, FILM COATED ORAL at 09:31

## 2024-09-03 RX ADMIN — ETHACRYNIC ACID 50 MG: 25 TABLET ORAL at 09:30

## 2024-09-03 RX ADMIN — SALINE NASAL SPRAY 2 SPRAY: 1.5 SOLUTION NASAL at 09:33

## 2024-09-03 RX ADMIN — ACETAMINOPHEN 975 MG: 325 TABLET ORAL at 06:13

## 2024-09-03 RX ADMIN — LEVOTHYROXINE SODIUM 100 MCG: 0.1 TABLET ORAL at 06:13

## 2024-09-03 RX ADMIN — GLYCERIN 1 DROP: .002; .002; .01 SOLUTION/ DROPS OPHTHALMIC at 06:16

## 2024-09-03 RX ADMIN — GLYCERIN 1 DROP: .002; .002; .01 SOLUTION/ DROPS OPHTHALMIC at 12:15

## 2024-09-03 RX ADMIN — WHITE PETROLATUM: 1.75 OINTMENT TOPICAL at 09:33

## 2024-09-03 RX ADMIN — ALUMINUM HYDROXIDE, MAGNESIUM HYDROXIDE, DIMETHICONE 30 ML: 400; 400; 40 SUSPENSION ORAL at 01:15

## 2024-09-03 RX ADMIN — SILDENAFIL 20 MG: 20 TABLET, FILM COATED ORAL at 13:31

## 2024-09-03 ASSESSMENT — COGNITIVE AND FUNCTIONAL STATUS - GENERAL
STANDING UP FROM CHAIR USING ARMS: 3 - A LITTLE
WALKING IN HOSPITAL ROOM: 3 - A LITTLE
MOVING TO AND FROM BED TO CHAIR: 3 - A LITTLE
CLIMB 3 TO 5 STEPS WITH RAILING: 2 - A LOT
CLIMB 3 TO 5 STEPS WITH RAILING: 3 - A LITTLE
STANDING UP FROM CHAIR USING ARMS: 3 - A LITTLE
WALKING IN HOSPITAL ROOM: 3 - A LITTLE
MOVING TO AND FROM BED TO CHAIR: 2 - A LOT

## 2024-09-03 NOTE — PROGRESS NOTES
Called and spoke with daughter, Tiffanie, during RRT to provide medical update. All questions answered.     Margot Palafox MD  Internal Medicine, PGY1  Pager #[phone]

## 2024-09-03 NOTE — PROGRESS NOTES
Patient discharged via ride from daughter with assistance down to the lobby via wheelchair with PCT. IV and tele removed. AVS reviewed with patient and all questions answered. All belongings packed and patients home O2 sent with patient.

## 2024-09-03 NOTE — SIGNIFICANT EVENT
Internal Medicine  RRT or Code Blue Note       SUMMARY OF EVENT   This is a 63 y.o. year-old female who was admitted on 8/8/2024 with  Lung nodule [R91.1]  Thoracic lymphadenopathy [R59.0]  Pulmonary edema, acute (CMS/HCC) [J81.0] and is currently being treated for Pulmonary edema, acute (CMS/HCC).  I was called to the room by overhead page for chest pain. Vitals are stable, heart rate mildly elevated. In the room the patient complains of chest pain and nausea. Of note, she has had episodes of chest pain before and was negative for ACS. An ECG was obtained and compared to this morning's with no changes. Trops, BNP, CMP and CBC ordered. Patient was given flexeril and ativan. A CXR was obtained and looks similar to prior. Given TTP and similar EKG, suspect this is MSK / anxiety vs ACS or acute HF exacerbation.           CODE STATUS      Their Code Status at the start of the event was Full Code      CODE LEADER   Clarisa Green MD       PERTINENT PHYSICAL EXAMINATION      Pre-Event Vital Signs: Temp:  [36.4 °C (97.5 °F)-36.8 °C (98.2 °F)] 36.8 °C (98.2 °F)  Heart Rate:  [77-98] 79  Resp:  [18] 18  BP: (111-127)/(56-62) 111/57     Physical Exam  Constitutional:       Appearance: She is not diaphoretic.   HENT:      Head: Normocephalic and atraumatic.   Eyes:      Extraocular Movements: Extraocular movements intact.      Pupils: Pupils are equal, round, and reactive to light.   Cardiovascular:      Rate and Rhythm: Tachycardia present.      Pulses: Normal pulses.      Heart sounds: Normal heart sounds.   Pulmonary:      Effort: Pulmonary effort is normal.   Abdominal:      General: Abdomen is flat.      Palpations: Abdomen is soft.   Musculoskeletal:         General: Tenderness present.      Cervical back: Normal range of motion and neck supple.      Comments: Tenderness at chest wall   Skin:     General: Skin is warm.   Neurological:      Mental Status: She is alert. Mental status is at baseline.     Patient is  anxious     SIGNIFICANT LABS / IMAGING      Labs  Labs are pending.    Imaging  Significant findings include: CXR with vascular congestion, unchanged from prior    ECG/Telemetry  I have independently reviewed the ECG. Significant findings include NSR with T wave inversions, unchanged from prior.          POST-EVENT      Diagnosis: MSK / Anxiety vs ACS vs acute heart failure    Patient condition at end of event: Stable    Disposition: Will remain on 3SW       KEY COMMUNICATION      Discussed with Attending (Ba Figueroa MD) or Covering Attending (Dr. Moreno): Yes/No: yes    Discussed with Emergency Contact: Extended Emergency Contact Information  Primary Emergency Contact: mela frenchenedeliarashawn  Mobile Phone: [phone]  Relation: Daughter  Secondary Emergency Contact: BillKinmasosa  Mobile Phone: [phone]  Relation: Daughter: Yes/No: yes    Follow-Up/Handoff: F/u trops, BNP, CMP, CBC     ATTENDING DOCUMENTATION  ALSO SEE ATTENDING ATTESTATION SECTION OF NOTE

## 2024-09-03 NOTE — TELEPHONE ENCOUNTER
[facility]- post op phone call not complete following procedure on 8/14. Patient is currently still an inpatient at [facility] as of today.

## 2024-09-03 NOTE — PLAN OF CARE
Plan of Care Review  Plan of Care Reviewed With: patient  Progress: no change  Outcome Evaluation: Patient received in bed awake,alert and responsive. The RRT called ealier for c/o chest  pain and no pain at this hour. The schedule medication given  and tolerated. Incontinent care provided.  FSP in place. Call light within reach.

## 2024-09-03 NOTE — PROGRESS NOTES
Cardiology - Pulmonary Hypertension Progress Note       Interval History:  Seen and examined sitting up in bed. She reports continued abdominal and leg pain. She reports on-going chest congestion and production of sputum. She reports vomiting over the weekend. She reports discomfort with swallowing. She reports chest pain, which woke her up overnight but is improved this AM.      Consult background from 8/9  Ms. Felix is a 63 y.o. female with a past medical history of Pulmonary HTN, HTN, Raynaud's Disease, Cutaneous Systemic Scleroderma (dx 1998), ILD, PE (3/2023). She is a patient of Dr. Nguyễn. She was previously followed by Dr. Jos Valdez at [facility]. Echocardiogram from 4/21/2022 showed LVEF: 55-60%, mild aortic valve thickening, pulmonary artery systolic pressure: 52 mmHg and trivial pericardial effusion. LHC and RHC (separate occasions) over the last 5-10 years which showed normal hemodynamics and normal coronaries. She had a RHC 9/02/2020 showing top-normal right sided filling pressures with mild pulmonary hypertension, top-normal PVR and normal pulmonary capillary wedge pressure. The cardiac index was normal, seen below.     On 6/27/2022, she was in [facility] ER for chest pain. EKG: NSR without ischemic changes. hsTrop: 12->16, d-dimer: 0.56, CXR showed cardiomegaly and diffuse interstitial prominence.     Dr. Nguyễn ordered echocardiogram, which was completed on 8/22/2022 and showed estimated RVSP = 50-55 mmHg, normal-sized LV with normal LV systolic function. Estimated EF 55- 60%. Wall motion grossly normal, mild concentric left ventricular hypertrophy, grade I LV diastolic dysfunction, probably normal RV size and function.    At her initial visit on 10/11/2022, she reported that she felt very short of breath with minimal activity most of the time. She reports that this started about 1 year prior and had progressively gotten worse. She described PND and bendopnea. She reported that she experienced ankle  swelling, worsened over the past year and usually worse towards the end of the night. She also reported some swelling in her abdomen. She reported daily palpations. I recommended a RHC which was done 11/28/2022 and showed: Normal right sided filling pressures. Mildly elevated left sided filling pressures. Precapillary pulmonary hypertension with mean PA 36 mmHg.    She was hospitalized on 3/19/2023 at [facility] for PE diagnosed on V/Q scan. She reports that she had presented with left arm pain and heaviness. She states that she was started on Apixaban. One week after her last office visit (4/6/2023), she presented to [facility] with chest pain, epistaxis and hemoptysis. Echocardiogram showed: left ventricular cavity size normal with mild left ventricular hypertrophy and preserved systolic function. Estimated EF 65%. Chest CTA showed no evidence of PE; Apixaban was discontinued.     She had a repeat echocardiogram (6/2023) which showed: Normal left ventricular cavity size and mild concentric left ventricular hypertrophy. Normal systolic function. EF: 60%. Normal right ventricular structure and function. Mild tricuspid valve regurgitation with estimated RVSP: 55 mmHg. Compared to previous study from 4/14/2023, estimated right ventricular systolic pressure were higher.    She underwent V/Q scan in September 2023, which showed intermediate-to-high probability of pulmonary embolic disease, as a result she completed CTA Chest PE which showed: no evidence for filling defect or vessel cutoff to suggest pulmonary embolism. Enlargement of the pulmonary arteries compatible with pulmonary arterial hypertension was seen.    She reports she started Macitentan around August or September 2023. She stated that she had not been checking her SpO2. She reports she did not notice any difference since starting it. She reported she had been feeling more shortness of breath at times with ADLs.     She presented to [facility] on 3/3/2024  with chest pressure, palpitations and nausea. She was noted to have hypoxia and tachypnea at presentation. She was found to have moderate-to-large pericardial effusion without tamponade. She was started on Colchicine 0.6 mg BID on 3/4/2024 and when she did not improve symptomatically, Prednisone 20 mg daily was added on 3/5/2024. On 3/7/2024, she developed multiple episodes of diarrhea and Colchicine dose was decreased to 0.3 mg. Coronary CTA, done as part of the ischemic workup, showed moderate 2 vessel CAD. She was recommended to begin Atorvastatin 20 mg daily and Clopidogrel 75 mg daily. She initially refused those two medications, but eventually agreed to take Atorvastatin.      She was discharged on Colchicine 0.3 mg for at least 3 months and Prednisone 20 mg for a total two week course until 3/19/2024, then decrease the dose to 10 mg daily for 2 weeks until 4/2/2024, then decrease dose to 5 mg daily for 2 weeks until 4/16/2024. She reports she tapered off Prednisone as instructed and has continued to take Colchicine as prescribed. She completed an echocardiogram (4/10/2024) which showed: Small-to-moderate sized, circumferential pericardial effusion. No echocardiographic evidence for cardiac tamponade. Compared to previous study from 3/4/2024, no significant change. Pericardial effusion size was similar, estimated right atrial pressure lower.      She presented to [facility] [place] [place] (4/16/2024) with increased pleuritic chest pain. On presentation to the ER, she was noted to be hypertensive and tachycardic. Labs showed mildly elevated troponin, elevated BNP and leukocytosis. CT angiography of the chest did not show evidence of PE. It was read as large pericardial effusion, evidence of emphysema, evidence of pulmonary hypertension, 16 mm left lobe lung mass.       She presented again to [facility] [place] [place] on 5/21/2024 at the request of her Rheumatologist. During her visit there, she was noted to be tachycardic and with some shortness of  "breath. Her chest pain was improved from her prior admission. On arrival, O2 saturations were normal. ECG showed: Sinus Tachycardia (HR: 125 bpm). BNP >300, hsTrop 32 ->24. POCUS in the emergency department showed evidence of pericardial effusion with no tamponade physiology. CTA Chest did not show evidence of pulmonary embolism. There were bilateral changes radiographically concerning for volume overload. She received Methylpredinsolone 125 mg in ED. Of note, she was on Prednisone 10 mg daily at home. She was given IV diuresis. She was discharged on Furosemide 20 mg PO PRN daily for fluid weight gain / swelling.     She had brief hospitalization and was discharged on 7/23 for chest pain. She presented to the ED on 7/25/2024 for worsening of her chronic chest pain and 8/5/2024 for facial swelling and chronic chest pain.      She underwent bronchoscopic lung biopsy yesterday, 8/8/2024, with Dr. Niles Rodriguez. Pathology showed atypical glandular cells which may be consistent with adenocarcinoma in situ.     Post-procedure CXR showed increase pulmonary vascular congestion. She was admitted for optimization.        ---    Rheumatology: Dr. Trevizo  Pulmonology: Dr. Wasserman    Past Medical / Surgical History:  Pulmonary HTN, HTN, Raynaud's Disease, Cutaneous Systemic Scleroderma (dx 1998), ILD, PE (3/2023), Follicular Carcinoma of Thyroid, Tenosynovitis, de Quervain, h/o Hernia Repair, h/o Appendicitis, h/o Digit Amputation, H/o Total Thyroidectomy (Dr. Pacheco at [facility]; 4/2013)    Family & Social History: She is , she has two children. She has worked as an .     Tobacco: former 2005  EtOH: never   Illicits: never     Her brother has CAD with stent  1st cousin with SLE  Both parents had \"heart problems\"    Allergies:   Allergies   Allergen Reactions    Aspirin Shortness of breath     Other reaction(s): Unknown  \"stop breathing\"      Ibuprofen Shortness of breath     Stop breathing    Iodine Shortness of breath "     Other reaction(s): Unknown    Iodine And Iodide Containing Products Shortness of breath     ? rash    Morphine Shortness of breath      Reported wamrth of face, improved with benadryl and cold compresses after getting morphine as well as episode of (subjective ) dyspnea, and thought she passed out - did have wamrth and erythema of face with mild edema R>L cheek notably on exam.     Penicillins Shortness of breath     Other reaction(s): Unknown    Sulfa (Sulfonamide Antibiotics) Angioedema    Clindamycin     Hydrochlorothiazide      Other reaction(s): Abdominal Pain   Slow heart rate    Oxycodone      Other reaction(s): Vomiting    Sulfamethoxazole-Trimethoprim      Other reaction(s): Vomiting, Weakness     Latex Rash       Medications:   Current Facility-Administered Medications   Medication Dose Route Frequency Provider Last Rate Last Admin    acetaminophen (TYLENOL) tablet 975 mg  975 mg oral q6h SPENCER Simon Cano DO   975 mg at 09/03/24 0613    amLODIPine (NORVASC) tablet 10 mg  10 mg oral q AM Simon Cano DO   10 mg at 09/02/24 0903    [Provider Managed Hold] atorvastatin (LIPITOR) tablet 40 mg  40 mg oral Daily (6p) Simon Cano DO   40 mg at 08/22/24 1733    benzocaine-menthol (CEPACOL/CHLORASEPTIC) lozenge 1 lozenge  1 lozenge Mouth/Throat q2h PRN Simon Cano DO   1 lozenge at 08/28/24 0938    benzonatate (TESSALON) capsule 100 mg  100 mg oral 3x daily PRN Simon Cano DO   100 mg at 08/28/24 1344    [Provider Managed Hold] bisacodyL (DULCOLAX) 10 mg suppository 10 mg  10 mg rectal TID Molly Hidalgo CRNP   10 mg at 08/24/24 2015    carboxymethylcellulose (REFRESH PLUS) 0.5 % ophthalmic dropperette 1 drop  1 drop Right Eye 3x daily PRN Simon Cano DO   1 drop at 08/20/24 0817    clopidogreL (PLAVIX) tablet 75 mg  75 mg oral Daily Simon Cano DO   75 mg at 09/02/24 0903    cyanocobalamin (VITAMIN B12)  tablet 1,000 mcg  1,000 mcg oral Daily Simon Cano DO   1,000 mcg at 09/02/24 0903    cyclobenzaprine (FLEXERIL) tablet 5 mg  5 mg oral 3x daily PRN Simon Cano DO   5 mg at 09/02/24 2239    glucose chewable tablet 16-32 g of dextrose  16-32 g of dextrose oral PRN Simon Cano DO        Or    dextrose 40 % oral gel 15-30 g of dextrose  15-30 g of dextrose oral PRN Simon Cano DO        Or    glucagon (GLUCAGEN) injection 1 mg  1 mg intramuscular PRN Simon Cano DO        Or    dextrose 50 % in water (D50) injection 12.5 g  25 mL intravenous PRN Simon Cano DO        dicyclomine (BENTYL) capsule 10 mg  10 mg oral 4x daily PRN Ritu Peterson DO   10 mg at 08/31/24 1632    ethacrynic acid (EDECRIN) tablet 50 mg  50 mg oral Daily Ba Figueroa MD        famotidine (PEPCID) injection 20 mg  20 mg intravenous Daily PRN Molly Hidalgo CRNP   20 mg at 09/02/24 0512    Or    famotidine (PEPCID) tablet 20 mg  20 mg oral Daily PRN Molly Hidalgo CRNP   20 mg at 08/31/24 0053    gabapentin (NEURONTIN) capsule 200 mg  200 mg oral TID Maddy Cope MD   200 mg at 09/02/24 2053    guaiFENesin (MUCINEX) 12 hr ER tablet 600 mg  600 mg oral BID Molly Hidalgo CRNP   600 mg at 09/02/24 2053    honey (MEDIHONEY) 100 % topical paste   Topical Daily PRN Simon Cano DO   Given at 08/27/24 2316    [Provider Managed Hold] HYDROmorphone (DILAUDID) tablet 2 mg  2 mg oral q4h PRN Molly Hidalgo CRNP   2 mg at 08/27/24 2057    levothyroxine (SYNTHROID) tablet 100 mcg  100 mcg oral Daily (6:30a) Simon Cano DO   100 mcg at 09/03/24 0613    lidocaine (ASPERCREME) 4 % topical patch 1 patch  1 patch Topical Daily Simon Cano DO   1 patch at 09/02/24 0904    lidocaine (ASPERCREME) 4 % topical patch 1 patch  1 patch Topical Daily Simon Cano DO   1 patch at 09/02/24 0903    lidocaine (ASPERCREME)  4 % topical patch 1 patch  1 patch Topical Daily Simon Cano DO   1 patch at 09/02/24 0904    macitentan (OPSUMIT) tablet 10 mg (Patient Owned)  10 mg oral Daily Simon Cano DO   10 mg at 09/02/24 0905    melatonin ODT 3 mg  3 mg oral Nightly PRN Simon Cano DO   3 mg at 08/28/24 2321    metoclopramide (REGLAN) tablet 5 mg  5 mg oral 3x daily PRN Ba Figueroa MD   5 mg at 09/02/24 1436    [Provider Managed Hold] mineral oil enema 133 mL  133 mL rectal BID Maddy Cope MD   133 mL at 08/24/24 1243    ondansetron (ZOFRAN) injection 4 mg  4 mg intravenous q6h PRN Margot Palafox MD        pantoprazole (PROTONIX) tablet,delayed release (DR/EC) 40 mg  40 mg oral Daily Molly Hidalgo CRNP   40 mg at 09/02/24 0903    peg 400-hypromellose-glycerin (ARTIFICIAL TEARS) 1-0.2-0.2 % eye drops 1 drop  1 drop Both Eyes q4h while awake Simon Cano DO   1 drop at 09/03/24 0616    phenoL (SORE THROAT SPRAY) 1.4 % mucosal spray 1 spray  1 spray Mouth/Throat q2h PRN Simon Cano DO        senna (SENOKOT) tablet 2 tablet  2 tablet oral BID Maddy Cope MD   2 tablet at 09/02/24 2053    sildenafiL (pulm.hypertension) (REVATIO) tablet 20 mg  20 mg oral TID Simon Cano DO   20 mg at 09/02/24 2053    simethicone (MYLICON) chewable tablet 80 mg  80 mg oral 4x daily PRN Ritu Peterson DO   80 mg at 08/31/24 0600    sodium chloride (OCEAN) 0.65 % nasal spray 2 spray  2 spray Each Nostril Daily Simon Cano DO   2 spray at 09/02/24 0902    white petrolatum (AQUAPHOR) ointment   Topical q4h PRN Simon Cano DO   Given at 08/13/24 2117    white petrolatum (AQUAPHOR) ointment   Topical BID Simon Cano DO   Given at 09/02/24 2056    white petrolatum-mineral oil (ARTIFICIAL TEARS OINT) ophthalmic ointment   Both Eyes Nightly Simon Cano DO   Given at 09/02/24 2056       Review of Systems:    All other systems reviewed and are negative except as per HPI.    Physical Exam:     Wt Readings from Last 10 Encounters:   09/03/24 52.8 kg (116 lb 8 oz)   08/05/24 49.4 kg (109 lb)   08/05/24 49.4 kg (109 lb)   07/20/24 49.5 kg (109 lb 2 oz)   07/19/24 51.3 kg (113 lb)   06/20/24 51.7 kg (114 lb)   06/14/24 52.6 kg (116 lb)   05/23/24 52.4 kg (115 lb 9.6 oz)   04/17/24 53.5 kg (118 lb)   04/10/24 44.5 kg (98 lb)       BP Readings from Last 5 Encounters:   09/03/24 (!) 129/59   08/05/24 (!) 148/75   07/25/24 (!) 155/77   07/23/24 (!) 101/57   07/19/24 (!) 154/80       Vitals:    09/03/24 0815   BP:    Pulse:    Resp:    Temp:    SpO2: 96%       Constitutional: NAD, AAOx3  HENT: Normocephalic, atraumatic head, sclerae anicteric, no cervical lymphadenopathy, trachea midline, facial swelling  Cardiovascular: RRR, no murmurs, rubs or gallops, JVP: < 6 cm H2O   cm H2O, no carotid bruits.  Respiratory: CTA bilateral lung fields, no wheezes, rales or rhonchi  GI: soft, non-tender/non-distended  Musculoskeletal / Extremities: +2 pulses bilateral radial, DP/PT arteries, skin tightening on face and fingertips, ulceration and discoloration of 2nd phalange b/l LE.   Skin: no rashes. Facial changes c/w scleroderma  Neuro / Psych: no focal deficits, CNII-XII grossly intact, appropriate, cooperative    Diagnostic Data:   Results from last 7 days   Lab Units 09/03/24  0503 09/02/24  2245 09/02/24  1056   WBC K/uL 4.20 5.46 5.13   HEMOGLOBIN g/dL 9.5* 9.5* 10.4*   HEMATOCRIT % 32.4* 31.6* 34.8*   MCV fL 90.5 89.3 89.0   PLATELETS K/uL 290 298 327     Results from last 7 days   Lab Units 09/03/24  0503 09/02/24  2245 09/02/24  1056 09/01/24  0940   SODIUM mEQ/L  --  139 140 140   POTASSIUM mEQ/L  --  4.2 4.0 4.0   CHLORIDE mEQ/L  --  104 103 103   CO2 mEQ/L  --  25 26 26   BUN mg/dL  --  46* 48* 41*   CREATININE mg/dL  --  1.9* 2.0* 2.2*   CALCIUM mg/dL  --  9.1 9.7 9.8   ALBUMIN g/dL  --  3.6  --   --    BILIRUBIN TOTAL mg/dL  --   0.3  --   --    ALK PHOS IU/L  --  60  --   --    ALT IU/L  --  11  --   --    AST IU/L  --  16  --   --    GLUCOSE mg/dL  --  105* 115* 109*   MAGNESIUM mg/dL 2.2  --  2.1 2.1     Component      Latest Ref Rn 7/19/2024 7/25/2024 8/26/2024 9/2/2024   BNP      <=100 pg/mL 151 (H)  220 (H)  315 (H)  80       Component      Latest Ref Rng 7/20/2024 7/25/2024   Sodium      136 - 145 mEQ/L 142  139    Potassium, Bld      3.5 - 5.1 mEQ/L 4.2  4.3    Chloride      98 - 107 mEQ/L 107  103    CO2      21 - 31 mEQ/L 25  27    BUN      7 - 25 mg/dL 17  17    Creatinine      0.6 - 1.2 mg/dL 1.2  1.1    Glucose      70 - 99 mg/dL 85  86    Calcium      8.6 - 10.3 mg/dL 9.3  10.6 (H)    AST (SGOT)      13 - 39 IU/L  15    ALT (SGPT)      7 - 52 IU/L  8    Alkaline Phosphatase      34 - 125 IU/L  82    Total Protein      6.0 - 8.2 g/dL  8.8 (H)    Albumin      3.5 - 5.7 g/dL  4.3    Bilirubin, Total      0.3 - 1.2 mg/dL  0.4    eGFR      >=60.0 mL/min/1.73m*2 51.0 (L)  56.6 (L)    Anion Gap      3 - 15 mEQ/L 10  9       Component      Latest Ref Rn 7/20/2024 7/25/2024   WBC      3.80 - 10.50 K/uL 7.27  10.29    Hemoglobin      11.8 - 15.7 g/dL 9.8 (L)  11.2 (L)    Hematocrit      35.0 - 45.0 % 32.0 (L)  37.3    MCV      83.0 - 98.0 fL 80.4 (L)  80.4 (L)    Platelets      150 - 369 K/uL 206  331       Component      Latest Ref Rng 7/19/2024 7/25/2024 8/9/2024   High Sens Troponin I      <15.0 pg/mL 89.2 (HH)  66.8 (HH)  92.5 (HH)    High Sens Troponin I       90.3 (HH)  83.4 (HH)         Component      Latest Ref Rng 5/22/2024 6/22/2024 7/19/2024 7/25/2024   BNP      <=100 pg/mL 352 (H)  150 (H)  151 (H)  220 (H)       Component      Latest Ref Rng 4/16/2024 5/23/2024 7/21/2024 7/22/2024   Sed Rate      0 - 30 mm/hr 101 (H)  79 (H)  105 (H)  >119 (H)       Component      Latest Ref Rng 4/18/2024 5/24/2024   WBC      3.80 - 10.50 K/uL 8.40  16.22 (H)    RBC      3.93 - 5.22 M/uL 3.54 (L)  3.82 (L)    Hemoglobin      11.8 - 15.7  g/dL 9.8 (L)  10.3 (L)    Hematocrit      35.0 - 45.0 % 32.8 (L)  33.9 (L)    Platelets      150 - 369 K/uL 254  289       Component      Latest [facility] 5/24/2024   Magnesium      1.8 - 2.5 mg/dL 2.1      Component      Latest [facility] 4/13/2023 3/7/2024 5/22/2024   TSH      0.34 - 5.60 mIU/L 4.71  0.28 (L)  1.03      Component      Latest [facility] 5/24/2024   Sodium      136 - 145 mEQ/L 138    Potassium, Bld      3.5 - 5.1 mEQ/L 4.4    Chloride      98 - 107 mEQ/L 104    CO2      21 - 31 mEQ/L 24    BUN      7 - 25 mg/dL 40 (H)    Creatinine      0.6 - 1.2 mg/dL 1.1    Glucose      70 - 99 mg/dL 87    Calcium      8.6 - 10.3 mg/dL 9.5    eGFR      >=60.0 mL/min/1.73m*2 56.6 (L)    Anion Gap      3 - 15 mEQ/L 10      Component      Latest [facility] 4/13/2023   Hemoglobin A1C      <5.7 % 4.4        Component      Latest [facility] 4/14/2023   Triglycerides      30 - 149 mg/dL 44    Cholesterol      <=200 mg/dL 194    HDL      >=55 mg/dL 49 (L)    LDL Calculated      <=100 mg/dL 136 (H)    Non-HDL, Calculated      mg/dL 145    RISK      <=5.0  4.0       Component      Latest [facility] 4/13/2023   High Sens Troponin I      <15.0 pg/mL 23.0 (H)       Component      Latest [facility] 4/14/2023   Ferritin      11 - 250 ng/mL 75      Component      Latest [facility] 4/14/2023   Iron      35 - 150 ug/dL 29 (L)    TIBC      270 - 460 ug/dL 245 (L)    UIBC      180 - 360 ug/dL 216    Iron Saturation      15 - 45 % 12 (L)        Component      Latest [facility] 6/27/2022 4/13/2023   BNP      <=100 pg/mL 111 (H)  105 (H)                          ---    ECG (8/29/2024, personally reviewed):   Sinus Rhythm   Lateral T-wave abnormality   HR: 81 bpm     ---    Lower extremity arterial duplex and ENRRIQUE August 10, 2024:  Right ankle ENRRIQUE (DP): 0.74.  Right ankle ENRRIQUE (PT): 0.88.  Left ankle ENRRIQUE (DP): 0.90.  Left ankle ENRRIQUE (PT): 0.62.    Findings concerning for hemodynamically significant stenosis within the  bilateral popliteal arteries and distal left  femoral artery. There are also  findings which can be seen with inflow stenosis at the level left common femoral  artery. CTA of the abdomen and pelvis with lower extremity runoff could be  performed for further evaluation if clinically indicated.    CT Chest / Abdomen / Pelvis w/ Contrast (7/25/2024, personally reviewed):     Lung bases: Left-sided pleural effusion, left parenchymal consolidation, and  findings of interstitial lung disease.  More nodular density in the medial  aspect of the left lower lobe measuring 1.6 x 1.9 cm.  Cardiomegaly with a  pericardial effusion.     Liver: The liver is normal in size.  There is no focal mass.  Hepatic veins and  portal veins are patent without focal filling defects to suggest thrombosis..     Gallbladder:  Small dependent gallstones.  No gallbladder wall thickening..     Spleen: Spleen is normal in size without focal mass lesion..     Pancreas: The pancreas is normal without focal mass, parenchymal atrophy, or  pancreatic duct dilation..     Adrenals: The adrenals are normal bilaterally without focal mass..     Kidneys, ureters and bladder:  Symmetric enhancement and excretion..  Left lower  pole cystic lesion measures 3.2 cm in maximal dimension.  This measures greater  than simple fluid density.  This measured 60 Hounsfield units on the noncontrast  CT from 2020.  It measures 36 Hounsfield units on today's study.  Therefore it  is most in keeping with a hemorrhagic/proteinaceous cyst.  Additional  hypodensities too small to characterize.     Retroperitoneal structures: There is no abdominal aortic aneurysm.  Atherosclerotic calcification.  There is no retroperitoneal adenopathy or  retroperitoneal mass..     Bowel and mesentery: No evidence of a focal inflammatory or obstructive process.  Moderate fecal retention throughout the colon.  There are a few fluid-filled  small bowel loops in the pelvis, nonspecific.     Lymph nodes: No pathologic lymphadenopathy..      Pelvis: The uterus is normal in size.  The ovaries are normal.  There is no  adnexal mass or pelvic free fluid.     Bones: Within normal limits.    No evidence for pulmonary embolism.  Persistent left-sided pleural effusion and airspace disease at the left lung  base.  Underlying interstitial lung disease.     TTE (7/19/2024, personally reviewed):   Mild increased wall thickness with normal left ventricular cavity and preserved systolic function. EF 65%. No wall motion abnormalities.   Normal right ventricular size with preserved systolic function.   IVC normal in size with >50% respiratory variation consistent with normal right sided filling pressures.   Small pericardial effusion. No evidence of hemodynamic compromise with normal respiratory variation and no chamber collapse. Prominent epicardial fat.   Compared with previous study of 5/23/2024, small pericardial effusion persists.      PET/CT Skull-to-Thigh (6/4/2024):   An approximately 1.6 cm pulmonary nodule at the medial left lung base is  significantly FDG avid at max SUV 8.5.  This is nonspecific and can be seen in  the setting of benign pathology however malignancy is a concern.     There is more mild to moderate nonspecific uptake localizing to lita-fissural  nodules particularly on the left.     There is  moderate nonspecific left hilar and mediastinal dawson uptake.     No additional suspicious FDG avid findings appreciated.     CTA Chest (5/21/2024):   IMPRESSION:  1.  No evidence of acute pulmonary embolism.  2.  Multiple new perifissural left lower lobe nodules, suspicious for a  neoplastic process. PET/CT and or tissue sampling is recommended.  3.  Chronic interstitial lung disease in an NSIP pattern, with overall slightly  progressed appearance since March 2023. Pulmonary consultation is recommended.  4.  Stable cardiomegaly and large pericardial effusion.  5.  Mediastinal and supraclavicular adenopathy, similar to prior study,  also  indeterminate, which could be further evaluated at time of PET/CT.     CTA Chest (4/16/2024):   IMPRESSION:  1. No evidence of pulmonary embolism.  2. Stable cardiomegaly and large pericardial effusion. Cardiology consultation  suggested.  3. Emphysema with bilateral lower lobe groundglass opacity and bronchiectasis in  an NSIP pattern, which can be seen with scleroderma. Pulmonary consultation  suggested.  4. Stable 16 mm left lower lobe masslike density which may represent  atelectasis, lung cancer or lymphoma. When clinically appropriate PET/CT  suggested.  5. Stable prominence of the main pulmonary artery which can be seen with  pulmonary hypertension     TTE (5/23/2024, personally reviewed):  Mild increased wall thickness with normal left ventricular cavity and preserved systolic function. EF 65%. No wall motion abnormalities.   Normal right ventricular size with preserved systolic function.   Tiny IVC with >50% respiratory variation consistent with low-normal right sided filling pressures.   Small pericardial effusion, predominantly adjacent to right atrium. No evidence of hemodynamic compromise with normal respiratory variation and no chamber collapse. Prominent epicardial fat.   Compared with previous study of 4/10/24, small pericardial effusion persists.      TTE (4/10/2024, personally reviewed):  1. Normal left ventricular cavity size with mild concentric left ventricular hypertrophy. Normal systolic function. EF: 60%. No regional wall motion abnormalities. Cannot determine diastolic function. Global longitudinal strain not reported due to technical limitations of study.   2. Aortic valve has three cusps. Trace aortic regurgitation. Aortic valve and root sclerosis.  3. Thickened mitral valve leaflets. Trace mitral valve regurgitation. Normal left atrial size.   4. Normal right ventricular structure and function. Trace tricuspid valve regurgitation with estimated RVSP: 51 mmHg (assuming right atrial  pressure of 3 mmHg). Normal right atrial size. Pulmonic valve structurally normal. Trace pulmonic valve regurgitation. Pulsed wave Doppler of the RVOT systolic profile shows decreased acceleration times and geometry consistent with mildly elevated PVR.  5. IVC size is normal with > 50% inspiratory collapse consistent with normal right atrial pressure. Small-to-moderate sized, circumferential pericardial effusion. No echocardiographic evidence for cardiac tamponade.   6. Compared to previous study from 3/4/2024, no significant change. Pericardial effusion size is similar, estimated right atrial pressure lower.           CTA Abd / Pelvis (3/13/2024, personally reviewed):   There is dilatation of the of proximal/mid small bowel with relatively abrupt  transition left hemiabdomen, likely obstruction. Questionable lesion at the  transition point. This could be better evaluated with CT or MRI enterography.     Aorta and major branches patent. Superior mesenteric artery patent without  significant stenosis.  Suspect moderate stenosis of the origins of the of renal arteries bilaterally.     Large pericardial effusion, no change.     Patchy opacities lower lungs, similar to prior.      Coronary CTA (3/11/2024):   1. Two-vessel coronary artery disease as above that is moderate in severity.  CT  FFR is normal..  2. There is mitral annular calcification and aortic sclerosis.  The right atrium  is enlarged.  There is left ventricular hypertrophy.  There is a moderate to  large circumferential pericardial effusion.  3. Normal left ventricular wall motion and systolic function.  4. Coronary calcium score 313, 96th percentile     TTE (3/4/2024, personally reviewed):   Normal-sized left ventricle. Mild left ventricular hypertrophy. Preserved systolic function. LVEF 60%. No regional wall motion abnormalities. Grade II diastolic dysfunction.  Normal global longitudinal strain (-20%).  The aortic valve is not well seen.  Cannot  determine the number of cusps.  Sclerotic leaflets.  No aortic stenosis.  Trace aortic insufficiency.  Normal sized aortic root.  The visualized portion of the ascending aorta is normal in size.  The mitral valve leaflets are thickened. Mild mitral annular calcification. Trace mitral regurgitation.   Mildly dilated left atrium.  Normal-sized right ventricle. Normal right ventricular systolic function.  Thickened tricuspid valve. There is mild tricuspid regurgitation with estimated RVSP of 46 mmHg.   Pulmonic valve is not well visualized. Trace pulmonic regurgitation.   Mildly dilated right atrium.  IVC is enlarged with <50% respiratory variation consistent with elevated right atrial filling pressure (at least 15 mmHg).   Moderate, circumferential, circumferential pericardial effusion.  The largest pocket is located inferolaterally.  Elevated right atrial pressure.  No other clear echocardiographic features of increased pericardial pressure.  Correlate clinically.   Compared to previous TTE from 6/21/2023, pericardial effusion now moderate in size.  Right atrial pressure now elevated.         CTA Chest (3/3/2024):   IMPRESSION:  1.  No evidence of acute pulmonary embolism to the level of the segmental  branches.  2.  Moderate to large pericardial effusion measuring higher than simple fluid  density.  3.  Lower lobe lung field predominant interstitial thickening with some relative  subpleural sparing, consistent with a chronic interstitial lung disease,  unchanged from the prior study.  4.  Continued bilateral axillary, mediastinal, and bilateral hilar  lymphadenopathy, not definitely changed from the prior study, possibly related  to the patient's sarcoid.  5.  Emphysema.      CTA Chest PE (10/4/2023):  IMPRESSION:  1. No evidence for filling defect or vessel cutoff to suggest pulmonary  embolism.  Enlargement of the pulmonary arteries compatible with pulmonary  arterial hypertension.  2. Changes throughout the lungs  as described above which can be seen with  systemic sclerosis/scleroderma.  Underlying pneumonia the lung bases cannot be  entirely excluded in the proper clinical setting.  3.  Additional stable findings as described above.        VQ (9/12/2023):  IMPRESSION:  Intermediate to high probability of pulmonary embolic disease.     6MWT (7/25/2023, on Sildenafil 20 mg TID):        TTE (6/21/2023, personally reviewed):  1. Normal left ventricular cavity size and mild concentric left ventricular hypertrophy. Normal systolic function. EF: 60%. No regional wall motion abnormalities. Grade I diastolic dysfunction.   2. Aortic valve cusps not well visualized. Trace aortic regurgitation. Aortic valve and root sclerosis.  3. Thickened mitral valve leaflets. Mild mitral annular calcification. Trace mitral regurgitation. Mildly dilated left atrial size.   4. Normal right ventricular structure and function. Mild tricuspid valve regurgitation with estimated RVSP: 55 mmHg (assuming right atrial pressure of 3 mmHg). Normal right atrial size. Pulmonic valve not well visualized. Trace pulmonic valve regurgitation. Pulsed wave Doppler of the RVOT systolic profile show decreased acceleration times ( msec) and late systolic notching consistent with elevated PVR.   5. IVC size is normal with > 50% inspiratory collapse consistent with normal right atrial pressure. Small pericardial effusion without echocardiographic evidence of tamponade.  6. Compared to previous study from 4/14/2023, estimated right ventricular systolic pressure is higher.        TTE (4/14/2023, personally reviewed):   The left ventricular cavity size is normal with mild left ventricular hypertrophy and preserved systolic function. Estimated EF 65%. No regional wall motion abnormalities. Grade I diastolic dysfunction.     The aortic valve is tricuspid. Aortic valve sclerosis. Trace aortic regurgitation.      The visualized portions of the aortic root and ascending  aorta are normal in size.     The mitral valve is mildly thickened. Mild mitral annular calcification. Trace mitral regurgitation.       The left atrium is severely dilated.      The right ventricle is normal in size with preserved systolic function     The pulmonic valve is not well seen.     The tricuspid valve is normal in appearance. mild tricuspid regurgitation with an estimated RVSP of 34 mmHg.       Right atrium is normal in size.     The IVC is small and collapses > 50% with inspiration consistent with normal right atrial pressures.     Small pericardial effusion, mostly posterior.       Compared to previous echocardiogram from 8/22/2022, there is no significant change        TTE (11/28/2022, personally reviewed):   Normal right- and mildly elevated left-sided filling pressures (RA: 3 mmHg, PCWP: 13 mmHg)  Elevated pulmonary artery pressures (60/22(35 mmHg)) in the setting of PCWP: 13 mmHg.   Normal cardiac output and index (CO: 5.1 L/min, CI: 3.2 L/min/m2)  PVR: 4.3 Wood units. SVR: 1840 dynes/sec/cm5      TTE (8/22/2022, personally reviewed):  Normal-sized LV. Normal LV systolic function. Estimated EF 55- 60%. Wall motion appears grossly normal. Mild concentric left ventricular hypertrophy. Grade I LV diastolic dysfunction.  Pulmonic valve not well visualized. Grossly normal pulmonic valve structure. Trace pulmonic valve regurgitation. No pulmonic valve stenosis.  Aortic valve not well visualized. Aortic valve not well seen but likely trileaflet. Focal aortic valve calcification present. Sclerotic aortic valve leaflets. Mild aortic valve regurgitation. No aortic valve stenosis.  RV not well visualized. Normal-sized RV. Normal RV systolic function.  Normal-sized RA.  There is a small loculated pericardial effusion anterior to the right atrium. No cardiac tamponade.  Sclerotic mitral valve. Mitral valve calcification of the anterior leaflet present.  Trace mitral valve regurgitation.  No significant mitral  valve stenosis.  Normal-sized IVC. IVC demonstrates normal respiratory collapse.  Tricuspid valve structure is grossly normal. Mild to moderate tricuspid valve regurgitation.  Estimated RVSP = 50-55 mmHg.  Mildly dilated LA.  No previous echocardiogram available for comparison.     TTE (4/21/2022, outside study):  This result has an attachment that is not available.     A complete transthoracic echocardiogram (including 2D, color flow   Doppler, spectral Doppler and M-mode imaging) was performed using the   standard protocol. The study quality was adequate.     The left ventricle is normal in size. There is moderately increased   wall thickness consistent with moderate concentric hypertrophy.   Endocardium is incompletely visualized, but no regional wall motion   abnormalities are noted in the visualized segments. Normal left   ventricular ejection fraction. The left ventricular ejection fraction by   visual estimate is 55% to 60%.     The right ventricle is normal in size. There is normal function of the   right ventricle.     The left atrium is normal in size. Left atrial volume is 58 mL.     The right atrial volume measures 52 mL. The right atrial volume index   measures 34 mL/m2.     There is trace mitral regurgitation. There is no mitral stenosis.     The aortic valve is trileaflet. There is mild aortic valve thickening.   There is no aortic stenosis. There is trace aortic regurgitation.     There is mild to moderate tricuspid regurgitation. Pulmonary artery   systolic pressure measures 52 mmHg. The pulmonary artery systolic pressure   is moderately elevated.     The aorta measures 3.2 cm at the sinus of Valsalva. The proximal   segment of the ascending aorta is mildly enlarged. The proximal segment of   the ascending aorta measures 3.4 cm. The proximal segment of the ascending   aorta measures 2.2 cm/m2, indexed for body surface area.     Trivial pericardial effusion present. There is no echocardiographic    evidence of cardiac tamponade.     The inferior vena cava is normal in size (diameter <21 mm) and   decreases >50% in size with inspiration, suggesting a normal right atrial   pressure of 3 mmHg (range 0-5 mm Hg).     Compared to the prior study of 3/13/2020, there are no significant   changes.        PFT (3/23/2022):      PFT (7/14/2021):      CT Chest (5/2/2021, outside study):  CLINICAL INFORMATION: Systemic sclerosis. Interstitial lung disease. Groundglass nodule     PROCEDURE: Unenhanced CT of the chest was performed using thin section reconstructions. Images were obtained on inspiration and expiration.     COMPARISON: June and August 2020     FINDINGS:     LUNGS, PLEURA:     Groundglass opacities with reticulation with subpleural sparing in the lower lobes, without honeycombing or architectural distortion, unchanged.     Right upper lobe groundglass nodule, image 114 of series 3, 8 x 11 mm, previously 6 mm.     Expiratory images are of diagnostic quality and do not demonstrate evidence of clinically significant air trapping.     CARDIOVASCULAR, MEDIASTINUM, THYROID: Coronary calcifications. Trace pericardial effusion.     LYMPH NODES: Subcentimeter mediastinal lymph nodes, unchanged. Unchanged axillary lymph nodes.     SKELETON, CHEST WALL: No destructive bone lesion     UPPER ABDOMEN: Patulous esophagus. 3 mm right renal stone.       RHC (9/02/2020):  RA   8 mmHg   RV   40/5 mmHg   PA (mean)  44/16 (25) mmHg   PCWP   12 mmHg   CO   4.65 lpm (Thermodilution)   CI   2.65 lpm/m-squared   SVI    33 ml/beat/m-squared (lower limit normal 29)   PVR   2.8 mmHg/l/min (Wood units)   SVR   2064 dyne-sec-cm-5         PFT (3/18/2020):      TTE (3/13/2020, outside study):  · The study quality was good.   · The left ventricle is normal in size. Top normal left ventricular wall   thickness. There are no segmental wall motion abnormalities. The left   ventricular ejection fraction is 55-60% by visual estimate and 3D    echocardiography. Normal left ventricular ejection fraction.   · There is grade I (mild) LV diastolic dysfunction.   · The right ventricle is normal in size. There is normal function of the   right ventricle.   · The right atrium is mildly dilated.   · There is mild mitral valve anterior leaflet thickening. There is mild   mitral valve leaflet calcification. There is flattening of the mitral   leaflets without raphael prolapse. There is trace mitral regurgitation.   · The aortic valve is trileaflet. There is mild thickening of the aortic   valve. There is mild calcification of the aortic valve. There is no aortic   /stenosis. There is trace aortic regurgitation.   · There is mild tricuspid valve leaflet thickening. There is mild to   moderate tricuspid regurgitation. The pulmonary artery systolic pressure   is moderately elevated. Pulmonary artery systolic pressure measures 50   mmHg. There is mid-systolic notching of the RVOT VTI consistent with   elevated pulmonary vascular resistance.   · The inferior vena cava is normal in size (diameter <21 mm) and decreases   >50% in size with inspiration, suggesting a normal right atrial pressure   of 3 mmHg (range 0-5 mm Hg).   · Trivial loculated pericardial effusion present adjacent to the right   ventricle and adjacent to the right atrium.          Assessment / Plan:     1. Chest Pain:  - Intermittent  - Given pattern and character, I believe this has musculoskeletal / serous pain from her autoimmune disease. There may be an element of myopericarditis.  - She reports that pain has not responded to increased in steroids in the past   - Non-ACS chest pain and known non-obstructive CAD as recently as March 2024 CCTA.     2. Pulmonary Hypertension (WHO Group I, NYHA/WHO FC III):   - Occurring in the background of Systemic Scleroderma with ILD. August 2022 TTE showed normal RV size and function, but progressive exertional decline, worsened DLCO and resting tachycardia suggest  there may be progression of her pulmonary vascular disease and limitation of cardiac output. This was proven with 11/2022 RHC showing mean PA 35 mmHg (with PCWP 13 mmHg) and PVR 4.3 Wood units. Recent TTEs have shown appropriate RV:PA coupling with normal RV size and systolic function.   - She is euvolemic on exam and by labs  - Would continue Ethacrynic Acid 50 mg daily to maintain euvolemic status.   - Measure and document daily standing weights and strict intake / output  - Aggressively replete electrolytes to keep K+>4 and Mg2+>2  - Limit dietary sodium to 2000 mg daily and fluid intake to 48-64 ounces   - Continue Sildenafil 20 mg TID and uninterrupted Macitentan 10 mg daily    3. HTN:   - Continue Amlodipine.     4. Systemic Scleroderma / Raynaud's Disease:   - Per Rheumatology (Dr. Trevizo).      5. ILD:   - Per Pulmonology and Rheumatology (Dr. Trevizo). She has not tolerated trials of Mycophenolate. She has been receiving Tocilizumab and got dose yesterday.    6. PE:   - She has had elevated probability on V/Q scans, but CTA Chest has consistently not shown evidence for acute or chronic thromboembolism. Apixaban has been discontinued.     7. CAD:   - Two-vessel non-obstructive disease on March 2024 CCTA. She is continued on Atorvastatin 40 mg daily.     8. PAD with Toe Ulcerations  -S/P left PTA dSFA/TP and DCB to popliteal arteries 8/14/24. Followed by vascular surgery.   -Hematoma noted with drop in hgb - clopidogrel was held.  -She received 1 unit of blood with improvement in hgb.   -continue Clopidogrel and Atorvastatin.     9. Lung Mass  - Pathology from 8/8 biopsy with atypical glandular cells, possibly consistent with adenocarcinoma  - Management per pulmonology, outpatient follow up.     Timothy Arvizu,   9/3/2024

## 2024-09-03 NOTE — PLAN OF CARE
Care Coordination Discharge Plan Summary    Admission Assessment Summary    General Information  Readmission Within the last 30 days: previous discharge plan unsuccessful  Does patient have a : No  Patient-Specific Goals (include timeframe): to go home    Living Arrangements  Arrived From: home  Current Living Arrangements: home  People in Home: alone  Home Accessibility:    Living Arrangement Comments:      Social Determinants of Health - Screenings  Housing Stability  In the last 12 months, was there a time when you were not able to pay the mortgage or rent on time?: No  In the last 12 months, was there a time when you did not have a steady place to sleep or slept in a shelter (including now)?: No  Utility Access  In the past 12 months has the electric, gas, oil, or water company threatened to shut off services in your home?: No  Transportation Needs  In the past 12 months, has lack of transportation kept you from medical appointments or from getting medications?: No  In the past 12 months, has lack of transportation kept you from meetings, work, or from getting things needed for daily living?: No    Functional Status Prior to Admission  Assistive Device/Animal Currently Used at Home: wheelchair, oxygen, stair glide  Functional Status Comments:    IADL Comments:      Discharge Needs Assessment    Concerns to be Addressed: discharge planning  Current Discharge Risk: chronically ill, physical impairment  Anticipated Changes Related to Illness: inability to care for self    Discharge Plan Summary    Patient Choice  Offered/Gave Vendor List: yes  Patient's Choice of Community Agency(s): [facility]  Patient and/or patient guardian/advocate was made aware of their right to choose a provider. A list of eligible providers was presented and reviewed with the patient and/or patient guardian/advocate in written and/or verbal form. The list delineates providers in the  patient’s desired geographic area who are participating in the Medicare program and/or providers contracted with the patient’s primary insurance. The Medicare list and quality ratings were obtained from the Medicare.gov [medicare.gov] website.    Concerns / Comments: Per  medical rounds - medically stable to d/c home today . pt aware /agreeable, requested i call daughter Koffi - Koffi will transport - just asking for medical team update - MD made aware . Will fax d/c instructions  to [facility]    Discharge Plan:  Disposition/Destination: Home Health Care - Other / Home       Connection to Community     Community Resources:      Discharge Transportation:  Is Out of Hospital DNR needed at Discharge: no  Does patient need discharge transport? No

## 2024-09-03 NOTE — PROGRESS NOTES
Nutrition Assessment    Recommendations    Continue current diet  -and encourage PO intake >75% of each meal, as tolerated.   Continue Ensure Plus BID   Daily MVI     Clinical Course: Patient is a 63 y.o. female who was admitted on 8/8/2024 with a diagnosis of Lung nodule [R91.1]  Thoracic lymphadenopathy [R59.0]  Pulmonary edema, acute (CMS/HCC) [J81.0].     Past Medical History:   Diagnosis Date    At risk for falls     De Quervain's tenosynovitis     Essential (primary) hypertension     Follicular carcinoma of thyroid (CMS/HCC)     Gastro-esophageal reflux     Hand ulceration (CMS/HCC)     Hyperlipidemia, unspecified     Interstitial lung disease (CMS/HCC)     Leg ulcer (CMS/HCC)     Lung nodule     Lupus (CMS/HCC)     O2 dependent     On 2L    Osteomyelitis of hand (CMS/HCC)     Osteoporosis     Pulmonary hypertension (CMS/HCC)     PVD (peripheral vascular disease) (CMS/HCC)     Raynaud's disease     Severe    Reflux esophagitis     Scleroderma (CMS/HCC)     Screening mammogram for breast cancer 05/04/2021    BI-RADS category: 1 - Negative (care everywhere @ [place])    Vertigo     Wound, open, foot     bilateral,dressing change with medihoney and mupirocin ointment by homecare nurse     Past Surgical History:   Procedure Laterality Date    CARDIAC CATHETERIZATION      COLONOSCOPY      HERNIA REPAIR      THYROIDECTOMY         Reason for Assessment  Reason For Assessment: physician consult, per organizational policy (Cx - CHF diet edu, F/U)  Diagnosis:  (pulmonary edema)     [facility] Nutrition Screen Tool  Has patient lost weight without trying?: 0-->No  Has patient been eating poorly due to decreased appetite?: 0-->No  [facility] Nutrition Screen Score: 0     Nutrition/Diet History  Intake (%): 25%     Physical Findings  Last Bowel Movement: 09/02/24  Skin: other (see comments) (several wounds intact)     [id number] Physical Appearance  Last Bowel Movement: 09/02/24  Skin: other (see comments) (several wounds intact)     Nutrition  "Order  Nutrition Order: meets nutritional requirements  Nutrition Order Comments: regular, 2L FR  Current TF/TPN Regimen: no     Anthropometrics  Height: 167.6 cm (5' 6\")     Current Weight  Weight Method: Standing scale  Weight: 52.8 kg (116 lb 8 oz)     Ideal Body Weight (IBW)  Ideal Body Weight (IBW) (kg): 59.58  % Ideal Body Weight: 84.51     Body Mass Index (BMI)  BMI (Calculated): 18.8  BMI Assessment: BMI 18.5-24.9: normal     Labs/Procedures/Meds  Lab Results Reviewed: reviewed, pertinent   Lab Results   Component Value Date    GLUCOSE 105 (H) 09/02/2024    CALCIUM 9.1 09/02/2024     09/02/2024    K 4.2 09/02/2024    CO2 25 09/02/2024     09/02/2024    BUN 46 (H) 09/02/2024    CREATININE 1.9 (H) 09/02/2024     Medications  Pertinent Medications Reviewed: reviewed, pertinent    acetaminophen  975 mg oral q6h SPENCER    amLODIPine  10 mg oral q AM    [Provider Managed Hold] atorvastatin  40 mg oral Daily (6p)    [Provider Managed Hold] bisacodyL  10 mg rectal TID    clopidogreL  75 mg oral Daily    cyanocobalamin  1,000 mcg oral Daily    ethacrynic acid  50 mg oral Daily    gabapentin  200 mg oral TID    guaiFENesin  600 mg oral BID    levothyroxine  100 mcg oral Daily (6:30a)    lidocaine  1 patch Topical Daily    lidocaine  1 patch Topical Daily    lidocaine  1 patch Topical Daily    macitentan  10 mg oral Daily    [Provider Managed Hold] mineral oil  133 mL rectal BID    pantoprazole  40 mg oral Daily    artificial tears  1 drop Both Eyes q4h while awake    senna  2 tablet oral BID    sildenafiL (pulm.hypertension)  20 mg oral TID    sodium chloride  2 spray Each Nostril Daily    white petrolatum   Topical BID    white petrolatum-mineral oil   Both Eyes Nightly       Estimated/Assessed Needs  Additional Documentation: Calorie Requirements (Group), Protein Requirements (Group)     Calorie Requirements  Estimated kCal Needs: Actual Body Weight (53kg)  Estimated Calorie Need Method: kcal/kg  Calorie/kg " Recommended: 30-35  Calorie Recommendations: 8183-9937     Protein Requirements  Recommended Dosing Weight (Estimated Protein Needs): Actual Body Weight (53kg)  Est Protein Requirement Amount (gms/kg):  (1.2-1.5)  Protein Recommendations: 65-80     Fluid requirements 1530mL     Dosing weight ABW 53kg     Skin: numerous impairments.   No edema noted   LBM: 9/1     NFPE: ill-appearing; underweight with notable losses/wasting.   Clinical comments:  Pt seen for nutrition follow up. Note RRT called on 9/2 after pt c/o severe chest pain.   Pt in bed during time of visit. Reporting fair appetite and PO intake-ate some of her breakfast ordered and was able to drink 100% of ensure on tray. Pt reports that she has been finishing these, regularly.   She does endorse discomfort with swallowing (intermittently) as well as blood in tissue when coughing-team made aware.   Otherwise pt c/o ongoing nausea; unresolved with zofran. Possibly try alternative antiemetic such as scopolamine patch if medically appropriate.   Pt had no additional questions/concerns regarding nutrition at this time.   Will continue to monitor and reassess per protocol unless otherwise consulted.   Goals:PO intake >75% of nutrient needs.  Monitor: Diet order, appetite, PO intake, labs, I/Os, skin integrity, wt status, GI function.     Recommendations: See above     PES  Statement: PES Statement  Nutrition Diagnosis: Inadequate Energy Intake  Related To:: Decreased ability to consume sufficient energy  As Evidenced By:: BMI 18.08  Nutritional Needs Met?: No                                   Date: 09/03/24  Signature: BECCA Matthews

## 2024-09-03 NOTE — PLAN OF CARE
Problem: Adult Inpatient Plan of Care  Goal: Plan of Care Review  Flowsheets (Taken 9/3/2024 1018)  Progress: no change  Plan of Care Reviewed With: patient   See RD note for recs

## 2024-09-03 NOTE — DISCHARGE SUMMARY
Hospital Medicine Service -  Inpatient Discharge Summary        BRIEF OVERVIEW   Patient: Arielle Felix (1960)    Admitting Provider: Eladio Patel MD  Attending Provider: No att. providers found Attending phys phone: N/A    PCP: Sara Simmons [phone]    Admission Date: 8/8/2024  Discharge Date: 9/3/2024     DISCHARGE DIAGNOSES      Primary Discharge Diagnosis  Pulmonary edema, acute (CMS/[facility])    Secondary Discharge Diagnoses  Active Hospital Problems    Diagnosis Date Noted    Interstitial lung disease (CMS/[facility]) 11/28/2022     Priority: High    Hematoma 08/16/2024    Nausea & vomiting 08/15/2024    Anxious appearance 08/13/2024    Pain of both eyes 08/11/2024    Wound of lower extremity 08/11/2024    Pain 08/10/2024    Other constipation 08/10/2024    Pulmonary edema, acute (CMS/[facility]) 07/19/2024    Chest pain 05/22/2024    Multiple open wounds of lower extremity 05/22/2024    Pulmonary nodule 04/16/2024    Acute on chronic heart failure with preserved ejection fraction (CMS/[facility]) 04/14/2023    Debility 04/14/2023    Palliative care by specialist 04/14/2023    Scleroderma (CMS/[facility]) 04/06/2023    Pulmonary hypertension (CMS/[facility]) 08/09/2022    Essential (primary) hypertension 08/09/2022      Resolved Hospital Problems   No resolved problems to display.     SUMMARY OF HOSPITALIZATION      Presenting Problem/History of Present Illness  Ms Felix is a 63 y.o. 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 She had a successful EBUS to 11L with on-site cytology negative for malignancy or granuloma. Interval post-procedure CXR revealed diffuse interstitial infiltrates with alveolar infiltrate throughout the majority of the left lung, and moderate size layering left pleural effusion which is concerning for  "volume overload. Additionally, post-procedure she was found to have intractably bony chest pain which has not been responsive to tylenol. She is currently on 3L NC (2L at baseline) but was saturating well when titrated back to 2L. Remaining vital are within acceptable limits. She reportedly did not take her daily edecrin today and this was ordered by the pulmonary team in the PACU.      Hospital Course        Patient admitted to [facility] from 8/8 to 8/21 for evaluation and management of chest pain after her bronchoscopy.     Regarding the bronchoscopy; Left lower lobe nodule, s/p ION navigational bronchoscopy with biopsy showing a small focus of atypical glands. Pulmonology recommends outpt f/u with pulm regarding biopsy findings.     In regards to the chest pain. Pt has chronic chest pain which is what prompted her work up for lung pathology, however she described this chest pain as \"bony chest pain \".  Exam and history suggest MSK etiology vs serous pain from her autoimmune disease.  No concern for ACS.  Cardiology was consulted, she is known to Dr. Arvizu. She was mildly hypervolemic on exam and was given intermittent doses of IV Edecrin and is now back on her home PO Edecrin. She is 114-116lbs on day of discharge.     Additionally, she was found to have bilateral lower extremity wounds and bilateral second digit dry gangrene. Podiatry and Vascular were consulted. She underwent LLE angio, balloon angio of distal SFA, TP trunk, DCB angio of popliteal artery on 8/14. Now on Plavix 75mg daily. She has an allergy to aspirin. Continue local wound care.     Patient developed lower abdominal pain post procedurally as well as intermittent nausea/vomiting.  LFTs unremarkable and lipase within normal limits.  CTAP showed an unremarkable gallbladder, pancreas.  UA bland.  CT did reveal a hematoma in the right inguinal region.  Ultrasound of right groin showed probable right groin/pelvic hematoma.  No evidence of right groin " pseudoaneurysm or AV fistula.  Her CBC was monitored closely, no signs of bleeding, her hemoglobin remained stable in the 7-8 range without the need for blood transfusion.  Her nausea/vomiting is thought to be multifactorial in the setting of constipation and medication side effects.  Ensure bowel regimen, though patient mostly refuses this. PRN zofran. Encouraged pt to not take medications on an empty stomach.     Her acute/chronic pain was extremely difficult to control.  She has multiple drug allergies including morphine, oxycodone, NSAIDs.  She is also very reluctant to start any new medications.  We consulted pain management who advised addition of Lyrica, Flexeril, tramadol.  Lyrica was ultimately stopped as per ophthalmology recommendations given concerns of it causing her blurry vision/dry eyes. She did not tolerate tramadol due to ?increased HR. She tolerated flexeril so will continue. Palliative care was also consulted. They recommended trial of fentanyl which pt tolerated but didn't find it helpful. Started PO dilaudid which may be contributing to nausea/vomiting however pt feels that it is helping with her pain. Also started gabapentin which can be uptitrated in the outpt setting. She will f/u with Pain management outpt for scheduled appointment.    She was evaluated by ophthalmology for eye swelling and blurriness.  She was recommended several drops which she reportedly intermittently refused.  She was advised to follow-up with ophthalmology outpatient.    Regarding her CHF and pHTN, she was followed by cardiology and received IV diuresis.  On day of discharge, she examined euvolemic and was transition back to her home Ethacrynic Acid 50 mg daily with plans for close outpatient follow-up.  She will continue home sildenafil and macitentan.     Important Issues to Address in Follow-Up    []  Follow up with PCP within 1-2 weeks    []  Follow up with Pain management for scheduled appointment: Dr. Charles  at the  Location. Pt booked an appt for 09/23/24 at 1:30p.     []  Follow up with Cardiology Dr. Arvizu at scheduled appointment     []  Follow up with Pulmonology     Exam on Day of Discharge  Physical Exam  General: No acute distress.  Chronically ill-appearing  HEENT: PERRL, sclerae anicteric. Moist mucous membranes.   Lungs: On 2 L NC clear to ascultation bilaterally. No wheezing.   Cardiovascular: Regular rate and rhythm. No murmurs.  Abdomen: Soft, non tender, non distended.    Extremities: Bilateral lower extremities with no edema, ulceration and missing digits.  Neuro: No focal deficits  Psych: Appropriate mood and affect      Consults During Admission  IP CONSULT TO CARDIOLOGY  IP CONSULT TO PAIN MANAGEMENT  IP CONSULT TO PODIATRY  IP CONSULT TO PAIN/PALLIATIVE CARE  IP CONSULT TO OPHTHALMOLOGY  IP CONSULT TO VASCULAR SURGERY  IP CONSULT TO NUTRITION SERVICES  IP CONSULT TO CASE MANAGEMENT  IP CONSULT TO NUTRITION SERVICES  IP CONSULT TO GASTROENTEROLOGY  IP CONSULT TO SPIRITUAL HEALTH AND EDUCATION  IP CONSULT TO RHEUMATOLOGY    DISCHARGE MEDICATIONS               Medication List        START taking these medications      clopidogreL 75 mg tablet  Commonly known as: PLAVIX  Take 1 tablet (75 mg total) by mouth daily.  Dose: 75 mg     cyclobenzaprine 5 mg tablet  Commonly known as: FLEXERIL  Take 1 tablet (5 mg total) by mouth 3 (three) times a day as needed for muscle spasms (/ chest pain).  Dose: 5 mg     gabapentin 100 mg capsule  Commonly known as: NEURONTIN  Take 2 capsules (200 mg total) by mouth 3 (three) times a day.  Dose: 200 mg     ondansetron 4 mg tablet  Commonly known as: ZOFRAN  Take 1 tablet (4 mg total) by mouth every 8 (eight) hours as needed for nausea or vomiting for up to 7 days.  Dose: 4 mg     polyethylene glycol 17 gram packet  Commonly known as: MIRALAX  Take 17 g by mouth 2 (two) times a day as needed (constipation) for up to 5 days.  Dose: 17 g            CHANGE how you take  these medications      ethacrynic acid 25 mg tablet  Commonly known as: EDECRIN  Start taking on: September 4, 2024  Take 2 tablets (50 mg total) by mouth daily.  Dose: 50 mg  What changed: how much to take     * MEDIHONEY (HONEY) TOP  Apply topically daily.  What changed: Another medication with the same name was added. Make sure you understand how and when to take each.     * honey 100 % paste  Commonly known as: MEDIHONEY  Apply topically daily.  What changed: You were already taking a medication with the same name, and this prescription was added. Make sure you understand how and when to take each.     pantoprazole 40 mg EC tablet  Commonly known as: PROTONIX  Start taking on: September 4, 2024  Take 1 tablet (40 mg total) by mouth daily Indications: gastroesophageal reflux disease.  Dose: 40 mg  What changed:   medication strength  how much to take           * This list has 2 medication(s) that are the same as other medications prescribed for you. Read the directions carefully, and ask your doctor or other care provider to review them with you.                CONTINUE taking these medications      ACTEMRA 200 mg/10 mL (20 mg/mL) solution  Infuse 8 mg/kg into a venous catheter every 28 (twentyeight) days.  Dose: 8 mg/kg  Generic drug: tocilizumab     amLODIPine 5 mg tablet  Commonly known as: NORVASC  Take 10 mg by mouth every morning.  Dose: 10 mg     atorvastatin 40 mg tablet  Commonly known as: LIPITOR  Take 1 tablet (40 mg total) by mouth daily.  Dose: 40 mg     benzonatate 100 mg capsule  Commonly known as: TESSALON  Take 100 mg by mouth 3 (three) times a day as needed for cough.  Dose: 100 mg     biotin 1 mg tablet  Take 1,000 mcg by mouth daily.  Dose: 1,000 mcg     cholecalciferol (vitamin D3) 1,000 unit (25 mcg) tablet  Take 1,000 Units by mouth daily.  Dose: 1,000 Units     cyanocobalamin (vitamin B-12) 1,000 mcg capsule  Take 1,000 mcg by mouth daily.  Dose: 1,000 mcg     docusate sodium 100 mg  capsule  Commonly known as: COLACE  Take 100 mg by mouth daily as needed for constipation.  Dose: 100 mg     hydrocortisone 1 % cream  Apply to affected area 2 times daily     levothyroxine 100 mcg tablet  Commonly known as: SYNTHROID  Take 100 mcg by mouth daily. BRAND ONLY  Dose: 100 mcg     lidocaine 4 % adhesive patch,medicated topical patch  Commonly known as: ASPERCREME  Apply 1 patch topically daily as needed (pain). Remove & discard patch within 12 hours or as directed by prescriber.  Dose: 1 patch     loperamide 2 mg capsule  Commonly known as: IMODIUM  Take 2 mg by mouth every 8 (eight) hours as needed for diarrhea.  Dose: 2 mg     meclizine 25 mg tablet  Commonly known as: ANTIVERT  Take 25 mg by mouth daily as needed.  Dose: 25 mg     mupirocin 2 % ointment  Commonly known as: BACTROBAN  Apply 1 application. topically daily. Behind ears  Dose: 1 application.     OPSUMIT 10 mg tablet tablet  Take 1 tablet (10 mg total) by mouth daily.  Dose: 10 mg  Generic drug: macitentan     SantyL ointment  Apply 1 Application topically daily.  Dose: 1 Application  Generic drug: collagenase     sildenafiL (pulm.hypertension) 20 mg tablet  Commonly known as: REVATIO  Take 1 tablet (20 mg total) by mouth 3 (three) times a day.  Dose: 20 mg     sodium chloride 0.65 % nasal spray  Commonly known as: OCEAN  Administer 2 sprays into each nostril daily.  Dose: 2 spray            ASK your doctor about these medications      HYDROmorphone 2 mg tablet  Commonly known as: DILAUDID  Take 1 tablet (2 mg total) by mouth every 4 (four) hours as needed (severe pain) for up to 5 days.  Dose: 2 mg  Ask about: Should I take this medication?               Instructions for after discharge       Discharge diet      Diet Type / Texture: Regular    Fluid restriction dietary / 24h: 2000 mL Fluid    Follow-up with Department:      [facility]mon[facility]   [phone]    [address] [place] [id number]       Follow-up  with Provider:      Kandace Charels MD   [phone]    [address]  [address]  [place] [address]       Follow-up with Provider:      Kandace Charles MD   [phone]    [address]  [address]  [place] [address]       Follow-up with department      [facility]   [phone]    [address]  [place] [address]       Follow-up with primary physician (PCP)      Follow-up with primary physician (PCP)      Follow-up with provider      Timothy Arvizu DO   [phone]    [address]  [facility] [address]  [place] [address]       Follow-up with provider      Timothy Arvizu DO   [phone]    [address]  [facility] [address]  [place] [address]       Post-Discharge Activity: Normal activity as tolerated.      Normal activity as tolerated.               PROCEDURES / LABS / IMAGING        Pertinent Labs  Results from last 7 days   Lab Units 09/03/24  0503   WBC K/uL 4.20   HEMOGLOBIN g/dL 9.5*   HEMATOCRIT % 32.4*   PLATELETS K/uL 290       Results from last 7 days   Lab Units 09/02/24  2245   SODIUM mEQ/L 139   POTASSIUM mEQ/L 4.2   CHLORIDE mEQ/L 104   CO2 mEQ/L 25   BUN mg/dL 46*   CREATININE mg/dL 1.9*   GLUCOSE mg/dL 105*   CALCIUM mg/dL 9.1       Pertinent Imaging  X-RAY CHEST 1 VIEW    Result Date: 9/3/2024  IMPRESSION:Cardiomegaly and pulmonary edema COMMENT:Frontal portable erect view of the chest performed and compared with August 10, 2024 examination. Cardiomegaly. Atheromatous and atherosclerotic changes of the imaged portions of the thoracic aorta. Findings of pulmonary vascular redistribution. No evidence of large pleural effusion or pneumothorax. Overlying lines, leads and cloth degraded evaluation.    X-RAY ABDOMEN 1 VIEW    Result Date: 8/24/2024  IMPRESSION: See above.     X-RAY ABDOMEN 1 VIEW    Result Date: 8/23/2024  IMPRESSION: Findings as described above.    X-RAY FOOT LEFT 2 VIEWS    Result Date:  8/22/2024  IMPRESSION: Limited single view of the left foot. Osteopenia. No gross fracture.    X-RAY ABDOMEN 1 VIEW    Result Date: 8/22/2024  IMPRESSION: Extensive stool retention in the colon.    X-RAY ANKLE LEFT 2 VIEWS    Result Date: 8/22/2024  IMPRESSION: Osteopenia. No acute fracture or dislocation of the left ankle.    CT ANGIOGRAPHY ABDOMEN PELVIS WITH AND WITHOUT IV CONTRAST    Result Date: 8/16/2024  IMPRESSION: Grossly stable appearance of ill-defined right inguinal region centered hematoma. No active extravasation identified. No active gastrointestinal hemorrhage identified.    ULTRASOUND GROIN PSEUDOANEURYSM    Result Date: 8/16/2024  IMPRESSION: Probable right groin/pelvic hematoma.  No evidence of right groin pseudoaneurysm or arteriovenous fistula.    CT ABDOMEN PELVIS WITHOUT IV CONTRAST    Result Date: 8/15/2024  IMPRESSION: Hematoma in the right inguinal region represents a change when compared to prior study from 8/9/2024.  Hyperdense material within the bladder is of uncertain etiology. Small left pleural effusion with interstitial opacities in the lung bases.     IR TECHNICAL ASSISTANCE    Result Date: 8/15/2024  IMPRESSION:  IR technical assistance was provided for fluoroscopy in the HYBRID OR.  Fluoro time is 9.9 minutes.  Dose is 25 mGy.     Ultrasound carotid bilateral    Result Date: 8/11/2024  IMPRESSION: 1. No evidence for hemodynamically significant stenosis within the bilateral carotid arteries. 2. Bilateral antegrade flow within the vertebral arteries.    CT ORBITS AND SELLA WITHOUT IV CONTRAST    Result Date: 8/11/2024  IMPRESSION: No evidence of acute process involving the orbits. Preliminary report was provided by overnight radiologist, Dr. Shelley Carroll, at 4:48 AM on 8/11/2024.    Ultrasound PVR lower extremity    Result Date: 8/10/2024  IMPRESSION: Ankle-Brachial Indices as above.    Ultrasound arterial leg bilateral    Result Date: 8/10/2024  IMPRESSION: Findings concerning  for hemodynamically significant stenosis within the bilateral popliteal arteries and distal left femoral artery. There are also findings which can be seen with inflow stenosis at the level left common femoral artery. CTA of the abdomen and pelvis with lower extremity runoff could be performed for further evaluation if clinically indicated.    X-RAY CHEST 1 VIEW    Result Date: 8/10/2024  IMPRESSION: Ongoing interstitial thickening suggestive of pulmonary edema, similar to prior.    CT ABDOMEN PELVIS WITHOUT IV CONTRAST    Result Date: 8/9/2024  IMPRESSION: Bibasilar airspace opacity/chronic areas of fissural thickening are seen in the lower lobes.  There is persistent left pleural effusion appreciated. Cardiomegaly again noted with small moderate pericardial effusion.. Moderate colonic stool burden especially through to the transverse colon.     X-RAY CHEST 1 VIEW    Result Date: 8/9/2024  IMPRESSION: Stable.     X-RAY CHEST 1 VIEW    Result Date: 8/8/2024  IMPRESSION: 1.  Stable marked cardiomegaly. 2.  Airspace opacities similar to prior.     FL FLUOROSCOPY TECHNICAL ASSISTANCE    Result Date: 8/8/2024  IMPRESSION: Technical assistance was provided for fluoroscopy, reference operative note.  Fluoro time is 3.4 minutes. Dose is 108.40 mGy.    X-RAY CHEST 1 VIEW    Result Date: 8/8/2024  IMPRESSION: 1. No pneumothorax following left lung biopsy. 2. Diffuse interstitial infiltrates with alveolar infiltrate throughout the majority of the left lung, and moderate size layering left pleural effusion. COMMENT:  Portable chest x-ray is compared with 8/5/2024. There is cardiomegaly with diffuse bilateral interstitial infiltrates. Alveolar infiltrate is present throughout the majority of the left lung, with layering moderate-sized pleural effusion. The right lung is partially obscured due to patient rotation. There is no pneumothorax.    X-RAY CHEST 1 VIEW    Result Date: 8/5/2024  IMPRESSION: Cardiomegaly with continued  left basilar airspace opacity and interstitial opacities.     OUTPATIENT  FOLLOW-UP / REFERRALS / PENDING TESTS        Outpatient Follow-Up Appointments            In 3 weeks Kandace Charles MD [facility] in [place]    In 1 month Timothy Arvizu DO [facility] General Cardiology at [facility]            Referrals  No orders of the defined types were placed in this encounter.      Test Results Pending at Discharge  Unresulted Labs (From admission, onward)       Start     Ordered    09/02/24 2228  HIGH SENSITIVE TROPONIN I (BASELINE - REFLEX 2HR)  STAT        Question:  Release to patient  Answer:  Immediate    09/02/24 2228    08/29/24 0904  CBC  Daily      Question:  Release to patient  Answer:  Immediate   Start Status   09/04/24 0600 Scheduled   09/05/24 0600 Scheduled   09/06/24 0600 Scheduled   09/07/24 0600 Scheduled       08/29/24 0903    08/29/24 0904  Basic metabolic panel  Daily      Question:  Release to patient  Answer:  Immediate   Start Status   09/04/24 0600 Scheduled   09/05/24 0600 Scheduled   09/06/24 0600 Scheduled   09/07/24 0600 Scheduled       08/29/24 0903    08/29/24 0904  Magnesium  Daily      Question:  Release to patient  Answer:  Immediate   Start Status   09/04/24 0600 Scheduled   09/05/24 0600 Scheduled   09/06/24 0600 Scheduled   09/07/24 0600 Scheduled       08/29/24 0903    08/08/24 1316  Fungal culture / smear Bronchioalveolar Lavage, Left Lower Lobe  RELEASE UPON ORDERING        Comments: Specimen A: Pre-op diagnosis:  R91.1 Lung Nodule R59.0 KAM     Question:  Release to patient  Answer:  Immediate    08/08/24 1316    08/08/24 1316  AFB culture Bronchioalveolar Lavage, Left Lower Lobe  RELEASE UPON ORDERING        Comments: Specimen A: Pre-op diagnosis:  R91.1 Lung Nodule R59.0 KAM     Question:  Release to patient  Answer:  Immediate    08/08/24 1316                    Important Issues to Address in Follow-Up  -Follow up with PCP    -Follow-up with pain management  -Follow-up with cardiology  -Follow-up with vascular surgery  -Follow-up with pulmonology  -Follow-up with rheumatology  -Follow-up with ophthalmology    DISCHARGE DISPOSITION AND DESTINATION      Disposition: Home Health Care - Other  Destination: Home                            Code Status At Discharge: Full Code    Physician Order for Life-Sustaining Treatment Document Status        No documents found                    Chicho Mccray MD    >30 mins spent for coordination/counselling related to discharge plan, including final examination of patient, discussion regarding discharge instructions, reconciliation/prescription of medications, preparation of discharge records, etc.

## 2024-09-03 NOTE — NURSING NOTE
Around 2230 patient c/o severe chest pain at the middle of the chest 10/10 scale. EKG obtained that shows NSR/abnormal ECG.  RRT called @2233  /56, hr86  pox 100%on 02@4L.  IV ativan 0.25ml ,gabapentin and flexile administered. Positive effect noted at this hour. Patient now sleeping. V/S  bp 105/52 and HR 84. Call light within reach.

## 2024-09-04 ENCOUNTER — TELEPHONE (OUTPATIENT)
Dept: CARDIOLOGY | Facility: CLINIC | Age: 64
End: 2024-09-04
Payer: COMMERCIAL

## 2024-09-04 NOTE — TELEPHONE ENCOUNTER
Hi -     Can you please schedule hospital follow-up appointment? Discharged 9/3.     Thanks,     Timothy

## 2024-09-05 LAB — FUNGUS SPEC CULT: NORMAL

## 2024-09-16 LAB
CASE RPRT: NORMAL
CLINICAL INFO: NORMAL
PATH REPORT.ADDENDUM SPEC: NORMAL
PATH REPORT.FINAL DX SPEC: NORMAL
PATH REPORT.FINAL DX SPEC: NORMAL
PATH REPORT.GROSS SPEC: NORMAL
PATH REPORT.INTRAOP OBS SPEC DOC: NORMAL

## 2024-09-20 LAB — MYCOBACTERIUM SPEC CULT: NORMAL

## 2024-09-25 NOTE — TELEPHONE ENCOUNTER
Pt calling to see if she can get an order for a concentrated traveling oxygen tank. Pt is very distraought and needs assistance in getting tank please advise [phone]

## 2024-09-25 NOTE — TELEPHONE ENCOUNTER
I called Arielle to follow up with her request for a portable oxygen concentrator.  I told her we do not order oxygen.  She told me she changed pulmonary doctors and saw him yesterday and was instructed to call Dr. Arvizu.  I told her I was sorry but her pulmonary or primary care physician orders the oxygen, not cardiology.  She was crying hysterically on the phone.  I told her I will try and help her figure this out.      I asked for the pulmonologist she was yesterday and it was Dr. Akers at [facility]. She was a patient of Dr. Wasseramn's here but changed.  I asked what her medical supply company was.  She said [employer] [phone].     I called [employer] and found out she is eligible for a portable concentrator.  They told me they will need a prescription for POC portable oxygen concentrator with her current liters oxygen. She said the order needs to be faxed to [phone].      I called [facility] and requested they send an order for POC to [facility].  They said they will send a message to her Doctor and his nurse.      I called Arielle and informed her I spoke with both [facility] and [employer].  I told her [facility] said they were going to send the message to Dr. Akers that she needs a POC.  She was thankful for the help.

## 2024-09-25 NOTE — TELEPHONE ENCOUNTER
Patient calling back to let Bridget know that she thinks she gave you the wrong name of the doctor.    The doctor she saw yesterday was Dr. Jose Ring

## 2024-09-26 LAB
CASE RPRT: NORMAL
CLIN PATH EVAL NOTE: NORMAL
CLINICAL INFO: NORMAL
PATH REPORT.ADDENDUM SPEC: NORMAL
PATH REPORT.FINAL DX SPEC: NORMAL
PATH REPORT.GROSS SPEC: NORMAL

## 2024-10-01 ENCOUNTER — TELEPHONE (OUTPATIENT)
Dept: SCHEDULING | Facility: CLINIC | Age: 64
End: 2024-10-01
Payer: COMMERCIAL

## 2024-10-01 RX ORDER — CLOPIDOGREL BISULFATE 75 MG/1
75 TABLET ORAL DAILY
Qty: 90 TABLET | Refills: 3 | Status: SHIPPED | OUTPATIENT
Start: 2024-10-01 | End: 2024-11-30 | Stop reason: HOSPADM

## 2024-10-01 RX ORDER — ATORVASTATIN CALCIUM 40 MG/1
40 TABLET, FILM COATED ORAL
Qty: 90 TABLET | Refills: 3 | Status: SHIPPED | OUTPATIENT
Start: 2024-10-01 | End: 2024-11-30 | Stop reason: HOSPADM

## 2024-10-01 RX ORDER — CLOPIDOGREL BISULFATE 75 MG/1
75 TABLET ORAL DAILY
Qty: 30 TABLET | Refills: 0 | Status: CANCELLED | OUTPATIENT
Start: 2024-10-01 | End: 2024-10-31

## 2024-10-01 NOTE — TELEPHONE ENCOUNTER
Medication Clarification     Name of caller: Arielle Felix     Relationship to patient: self     Name of patient: Arielle GUAJARDO Isidro    Name of physician: Timothy Arvizu,     Name of medication: clopidogreL (PLAVIX) 75 mg tablet and gabapentin (NEURONTIN) 100 mg capsule     What needs to be clarified: pt would like to know if she should be still taking these medication     If so, pt is out of the Plavix. Please send a 90 day refill to the pharmacy     Lastly, Pt believes she is getting a rash from the gabapentin (NEURONTIN) 100 mg capsule medication     Please advise     Best contact number:[phone]

## 2024-10-01 NOTE — TELEPHONE ENCOUNTER
BLAS/ALENA- WISAM, thank you.    I called the patient back & informed her that Plavix should be continued per discharge summary on 9/3/24.     I told her I will refill this medication to her pharmacy. She verbalized understanding.     Regarding gabapentin- this is being managed by her pain management team at [facility]. I told her to contact their office regarding her concerns. I gave her the number to [facility] pain management doctor, who she has been referred to. She verbalized underdtandign & thanked me for the call.

## 2024-10-01 NOTE — TELEPHONE ENCOUNTER
[facility] pharmacy tech from [employer] calling to let Dr Arvizu know pt has questions as to why she was prescribed atorvastatin 40mg. Please advise pt [phone]

## 2024-10-01 NOTE — TELEPHONE ENCOUNTER
Pt called and stated she wanted the doctor to know she is getting another Biopsy on 10/8    Pt can be reached at [phone]

## 2024-10-01 NOTE — TELEPHONE ENCOUNTER
BLAS/ALENA- The patient also wanted Dr. Arvizu to be aware of the below. I asked if she was asked to get CC and she didn't seem sure. She also said she does not have the name, number, or fax number.     She then provided:  Dr. Jose Ring   P: [phone]     Please advise, thank you.

## 2024-10-01 NOTE — TELEPHONE ENCOUNTER
I called Ms. Felix and explained to her she is on the atorvastatin because she had coronary calcification on her CT pulmonary angiogram.  I explained to her she is on the atorvastatin for her cholesterol.  I told her she is on the plavix and atorvastatin because of the coronary calcifications.  She verbally understood.        She wanted to make sure Dr. Arvizu knows she has to have another lung biopsy.

## 2024-10-03 ENCOUNTER — TELEPHONE (OUTPATIENT)
Dept: SCHEDULING | Facility: CLINIC | Age: 64
End: 2024-10-03
Payer: COMMERCIAL

## 2024-10-03 ENCOUNTER — OFFICE VISIT (OUTPATIENT)
Dept: VASCULAR SURGERY | Facility: CLINIC | Age: 64
End: 2024-10-03
Payer: COMMERCIAL

## 2024-10-03 VITALS
SYSTOLIC BLOOD PRESSURE: 128 MMHG | DIASTOLIC BLOOD PRESSURE: 76 MMHG | TEMPERATURE: 98.4 F | BODY MASS INDEX: 18.96 KG/M2 | WEIGHT: 118 LBS | HEIGHT: 66 IN | HEART RATE: 106 BPM

## 2024-10-03 DIAGNOSIS — I73.9 PERIPHERAL ARTERIAL DISEASE (CMS/HCC): ICD-10-CM

## 2024-10-03 DIAGNOSIS — S81.809A WOUND OF LOWER EXTREMITY, UNSPECIFIED LATERALITY, INITIAL ENCOUNTER: Primary | ICD-10-CM

## 2024-10-03 PROCEDURE — 3078F DIAST BP <80 MM HG: CPT | Performed by: SURGERY

## 2024-10-03 PROCEDURE — 3074F SYST BP LT 130 MM HG: CPT | Performed by: SURGERY

## 2024-10-03 PROCEDURE — 99214 OFFICE O/P EST MOD 30 MIN: CPT | Performed by: SURGERY

## 2024-10-03 PROCEDURE — 3008F BODY MASS INDEX DOCD: CPT | Performed by: SURGERY

## 2024-10-03 NOTE — LETTER
October 3, 2024       No Recipients    Patient: Arielle Felix  YOB: 1960  Date of Visit: 10/3/2024      Dear Dr. Castillo Recipients:    Thank you for referring Arielle Felix to me for evaluation. Below are my notes for this consultation.    If you have questions, please do not hesitate to call me. I look forward to following your patient along with you.         Sincerely,        Rashel Richards MD FACS        CC:   No Recipients    Rashel Richards MD FACS  10/3/2024  2:04 PM  Sign when Signing Visit     [facility]   [address] [place] [address]  [place] [id number]  P: [phone]     F: [phone]       Vascular Surgery Follow up Visit    Subjective     Patient is a 63 y.o. female who has a complex past history of peripheral vascular disease, raynaunds disease, pulmonary hypertension, hypertension, interstitial lung disease, coronary artery disease, scleroderma and GERD.  She has had amputations of several fingers.  Arielle Felix was last seen on 8/21/2024 at the time of discharge.  At that time because of gangrenous changes in the toes she underwent bilateral lower extremity angiography with angioplasty of the left superficial femoral and left tibioperoneal trunk.  Arielle Felix presents at this time for follow-up evaluation.  At the time of inpatient evaluation ENRRIQUE on the right was 0.88 and on the left 0.62.  There are areas of high velocity in the left lower extremity.  On 8/14/2024 she underwent angiography with bilateral lower extremity runoff and required plain and drug-coated balloon angioplasty of the distal superficial femoral artery and tibioperoneal trunk with excellent post procedure results.    Review of Systems  Systemic: No fever, no chills, and no recent weight change.  Chronic headache.  Chronic pain syndrome.  Visual disturbances  Neck: Chronic neck discomfort  Otolaryngeal: no hoarseness, no dysphagia.   Cardiovascular: Bony chest pain    respiratory: Severe shortness of breath.  Oxygen dependent  Gastrointestinal: Appetite without significant change. No dysphagia, no active abdominal pain, and no melena. No bright red blood per rectum.  Genitourinary: No hematuria, No genital lesion.  No obvious bladder changes.   Endocrine: No polydipsia and no excessive sweating.  Hematologic: No easy bleeding and no tendency for easy bruising.   Integumentary: No obvious masses  Musculoskeletal: Severe pains at all sites.  Neurological: No specific neurologic disorders psychological: Appropriate.  Skin: No pruritus. No skin lesions and no rash    Objective     Medical History:   Past Medical History:   Diagnosis Date    At risk for falls     De Quervain's tenosynovitis     Essential (primary) hypertension     Follicular carcinoma of thyroid (CMS/HCC)     Gastro-esophageal reflux     Hand ulceration (CMS/HCC)     Hyperlipidemia, unspecified     Interstitial lung disease (CMS/HCC)     Leg ulcer (CMS/HCC)     Lung nodule     Lupus (CMS/HCC)     O2 dependent     On 2L    Osteomyelitis of hand (CMS/HCC)     Osteoporosis     Pulmonary hypertension (CMS/HCC)     PVD (peripheral vascular disease) (CMS/HCC)     Raynaud's disease     Severe    Reflux esophagitis     Scleroderma (CMS/HCC)     Screening mammogram for breast cancer 05/04/2021    BI-RADS category: 1 - Negative (care everywhere @ [place])    Vertigo     Wound, open, foot     bilateral,dressing change with medihoney and mupirocin ointment by homecare nurse       Surgical History:   Past Surgical History   Procedure Laterality Date    ANGIOGRAM/ANGIOPLASTY FEMORAL , POSS. STENT/COIL/FEM-POP Bilateral 8/14/2024    Performed by Rashel Richards MD FACS at [facility] OR    Cardiac catheterization      Colonoscopy      Hernia repair      ION Navigational Bronchoscopy with Radial Probe and Fluoroscopy + EBUS N/A 8/8/2024    Performed by Joel Rodriguez MD at [facility] OR    RIGHT HEART CATH N/A 11/28/2022    Performed by Nolberto  DO Timothy at [facility] CARDIAC CATH/EP    Thyroidectomy         Social History:   Social History     Social History Narrative    Not on file       Family History:   Family History   Problem Relation Name Age of Onset    Heart disease Biological Brother          stent    Breast cancer Niece      Cervical cancer Neg Hx      Uterine cancer Neg Hx      Colon cancer Neg Hx      Ovarian cancer Neg Hx         Allergies: Aspirin, Ibuprofen, Iodine, Iodine and iodide containing products, Morphine, Penicillins, Sulfa (sulfonamide antibiotics), Clindamycin, Hydrochlorothiazide, Oxycodone, Sulfamethoxazole-trimethoprim, and Latex    Current Medications:  Current Outpatient Medications   Medication Sig Dispense Refill    amLODIPine (NORVASC) 5 mg tablet Take 10 mg by mouth every morning.      atorvastatin (LIPITOR) 40 mg tablet Take 1 tablet (40 mg total) by mouth daily. 90 tablet 3    benzonatate (TESSALON) 100 mg capsule Take 100 mg by mouth 3 (three) times a day as needed for cough.      biotin 1 mg tablet Take 1,000 mcg by mouth daily.      cholecalciferol, vitamin D3, 1,000 unit (25 mcg) tablet Take 1,000 Units by mouth daily.      clopidogreL (PLAVIX) 75 mg tablet Take 1 tablet (75 mg total) by mouth daily. 90 tablet 3    collagenase (SantyL) ointment Apply 1 Application topically daily.      cyanocobalamin, vitamin B-12, 1,000 mcg capsule Take 1,000 mcg by mouth daily.      docusate sodium (COLACE) 100 mg capsule Take 100 mg by mouth daily as needed for constipation.      ethacrynic acid (EDECRIN) 25 mg tablet Take 2 tablets (50 mg total) by mouth daily. 60 tablet 0    gabapentin (NEURONTIN) 100 mg capsule Take 2 capsules (200 mg total) by mouth 3 (three) times a day. 180 capsule 0    hydrocortisone 1 % cream Apply to affected area 2 times daily 15 g 0    levothyroxine (SYNTHROID) 100 mcg tablet Take 100 mcg by mouth daily. BRAND ONLY      loperamide (IMODIUM) 2 mg capsule Take 2 mg by mouth every 8 (eight) hours as needed for  diarrhea.      meclizine (ANTIVERT) 25 mg tablet Take 25 mg by mouth daily as needed.      MEDIHONEY, HONEY, TOP Apply topically daily.      mupirocin (BACTROBAN) 2 % ointment Apply 1 application. topically daily. Behind ears      pantoprazole (PROTONIX) 40 mg EC tablet Take 1 tablet (40 mg total) by mouth daily Indications: gastroesophageal reflux disease. 30 tablet 0    sildenafiL, pulm.hypertension, (REVATIO) 20 mg tablet Take 1 tablet (20 mg total) by mouth 3 (three) times a day. 270 tablet 3    sodium chloride (OCEAN) 0.65 % nasal spray Administer 2 sprays into each nostril daily.      tocilizumab (ACTEMRA) 200 mg/10 mL (20 mg/mL) solution Infuse 8 mg/kg into a venous catheter every 28 (twentyeight) days.      cyclobenzaprine (FLEXERIL) 5 mg tablet Take 1 tablet (5 mg total) by mouth 3 (three) times a day as needed for muscle spasms (/ chest pain). 90 tablet 0    honey (MEDIHONEY) 100 % paste Apply topically daily. 15 mL 0    lidocaine (ASPERCREME) 4 % adhesive patch,medicated topical patch Apply 1 patch topically daily as needed (pain). Remove & discard patch within 12 hours or as directed by prescriber. 30 patch 0    ondansetron (ZOFRAN) 4 mg tablet Take 1 tablet (4 mg total) by mouth every 8 (eight) hours as needed for nausea or vomiting for up to 7 days. 21 tablet 0    OPSUMIT 10 mg tablet tablet Take 1 tablet (10 mg total) by mouth daily. 30 tablet 0    polyethylene glycol (MIRALAX) 17 gram packet Take 17 g by mouth 2 (two) times a day as needed (constipation) for up to 5 days. 10 each 0     No current facility-administered medications for this visit.       Vital Signs:  10/3/2024    Physical Exam     General appearance: Very frail female appears older than stated age.  She was examined in wheelchair head: normocephalic, without obvious abnormality, atraumatic  Neck: There is limited neck mobility.  Carotids have good upstroke no carotid bruits  Heart: Regular and rhythmic without murmur gallop.  Lungs:  clear to auscultation bilaterally.  She is on oxygen therapy currently.  Chest wall: no tenderness  Back: symmetric, no curvature. ROM normal. No CVA tenderness  Abdomen: soft, non-tender; bowel sounds normal; no masses, no organomegaly.  No hernias.  No intra-abdominal bruits.  Extremities: Upper extremities have easily palpable distal pulses.  The skin is tight and shiny.  There areas of amputation.  Lower extremities show palpable femoral pulses.  Distal pulses were nonpalpable on the right and very strongly palpable in the posterior tibial artery on the left.  Skin is also shiny and tight.  There are areas of dry gangrene bilaterally.  Lower extremities much warmer than the right.  Pulses:  Femoral pulses are easily palpable.  Distal pulses are not palpable on the right and there is a strongly palpable left posterior tibial vessel.  Skin: Skin color, texture, turgor normal. No rashes or lesions  Neurologic: Grossly normal    Labs/Imaging     Labs  0 Result Notes               Component  Ref Range & Units 9/2/24 2245 9/2/24 1056 9/1/24 0940 8/31/24 0929 8/30/24 0913 8/29/24 1126 8/26/24 1211    Sodium  136 - 145 mEQ/L 139 140 140 140 140 142 140    Potassium  3.5 - 5.1 mEQ/L 4.2 4.0 4.0 4.6 4.9 4.2 4.8    Chloride  98 - 107 mEQ/L 104 103 103 104 108 High  109 High  108 High     CO2  21 - 31 mEQ/L 25 26 26 28 27 24 24    BUN  7 - 25 mg/dL 46 High  48 High  41 High  35 High  28 High  28 High  27 High     Creatinine  0.6 - 1.2 mg/dL 1.9 High  2.0 High  2.2 High  1.6 High  1.3 High  1.3 High  1.3 High     Glucose  70 - 99 mg/dL 105 High  115 High  109 High  83 76 125 High  83    Calcium  8.6 - 10.3 mg/dL 9.1 9.7 9.8 9.5 9.0 9.0 9.0    AST (SGOT)  13 - 39 IU/L 16          ALT (SGPT)  7 - 52 IU/L 11          Alkaline Phosphatase  34 - 125 IU/L 60          Total Protein  6.0 - 8.2 g/dL 6.8          Albumin  3.5 - 5.7 g/dL 3.6          Bilirubin, Total  0.3 - 1.2 mg/dL 0.3          eGFR  >=60.0 mL/min/1.73m*2 29.4 Low   27.6 Low  CM 24.6 Low  CM 36.1 Low  CM 46.3 Low  CM 46.3 Low  CM 46.3 Low  CM   Comment: Calculation based on the Chronic Kidney Disease Epidemiology Collaboration (CKD-EPI) equation refit without adjustment for race.    Anion Gap  3 - 15 mEQ/L 10 11 11 8 5 9 8                 Imaging  No new imaging results.    Assessment and Plan       Problem List Items Addressed This Visit       Wound of lower extremity - Primary    Relevant Orders    Ultrasound ENRRIQUE extremity    Ultrasound arterial leg bilateral    Ambulatory referral to Podiatry    Peripheral arterial disease (CMS/HCC)    Current Assessment & Plan     60-year-old female status post aortogram and bilateral lower extremity angiography.  Intervention on the left was in the superficial femoral and tibioperoneal trunk with plain and drug-coated balloon angioplasty with excellent results.  Recommendation: Patient should be seen by podiatry for recommendations.  If she requires intervention on the right angiography with angioplasty can be performed.            Rashel Richards MD, FACS  Chief, Division of Vascular Surgery  [facility]      Thank you very much for allowing us to participate in the care of your patient.  I will keep you informed of Arielle Felix's care.  Please not hesitate to call or email if there are any questions.  I can be reached at [phone](mobile) or [email or web address].    Sincerely.    Kiran Richards

## 2024-10-03 NOTE — TELEPHONE ENCOUNTER
Linda from [facility] called for medication hold advice for Plavix.    Linda can be reached at [phone]  fax [phone]    Pt is to have a biopsy 10/08 and they need an answer by 11am      No Cardiac clearance is necessary

## 2024-10-03 NOTE — PATIENT INSTRUCTIONS
Dear Arielle Felix    Your physician has requested that you have follow up vascular testing.    To schedule your appointment at the [facility] location, please call [phone].    The name(s) of your test(s) is/are:    US ENRRIQUE EXTREMITY  US ARTERIAL LEG BILATERAL  AMB REFERRAL TO PODIATRY    After you schedule your testing, please call our office at [phone] to schedule your follow up.     *If you are having the test outside of the [facility], we ask that you obtain a CD of the study and a copy of your report.   Please bring it to our office at least three (3) days prior to your appointment for the physician to review.    If you have any questions, please call the office at [phone].      Thank you,    [facility]

## 2024-10-03 NOTE — ASSESSMENT & PLAN NOTE
60-year-old female status post aortogram and bilateral lower extremity angiography.  Intervention on the left was in the superficial femoral and tibioperoneal trunk with plain and drug-coated balloon angioplasty with excellent results.  Recommendation: Patient should be seen by podiatry for recommendations.  If she requires intervention on the right angiography with angioplasty can be performed.

## 2024-10-03 NOTE — PROGRESS NOTES
[facility]   [address] [place] [address] [place] [address]  P: [phone]     F: [phone]       Vascular Surgery Follow up Visit    Subjective     Patient is a 63 y.o. female who has a complex past history of peripheral vascular disease, raynaunds disease, pulmonary hypertension, hypertension, interstitial lung disease, coronary artery disease, scleroderma and GERD.  She has had amputations of several fingers.  Arielle Felix was last seen on 8/21/2024 at the time of discharge.  At that time because of gangrenous changes in the toes she underwent bilateral lower extremity angiography with angioplasty of the left superficial femoral and left tibioperoneal trunk.  Arielle Felix presents at this time for follow-up evaluation.  At the time of inpatient evaluation ENRRIQUE on the right was 0.88 and on the left 0.62.  There are areas of high velocity in the left lower extremity.  On 8/14/2024 she underwent angiography with bilateral lower extremity runoff and required plain and drug-coated balloon angioplasty of the distal superficial femoral artery and tibioperoneal trunk with excellent post procedure results.    Review of Systems  Systemic: No fever, no chills, and no recent weight change.  Chronic headache.  Chronic pain syndrome.  Visual disturbances  Neck: Chronic neck discomfort  Otolaryngeal: no hoarseness, no dysphagia.   Cardiovascular: Bony chest pain   respiratory: Severe shortness of breath.  Oxygen dependent  Gastrointestinal: Appetite without significant change. No dysphagia, no active abdominal pain, and no melena. No bright red blood per rectum.  Genitourinary: No hematuria, No genital lesion.  No obvious bladder changes.   Endocrine: No polydipsia and no excessive sweating.  Hematologic: No easy bleeding and no tendency for easy bruising.   Integumentary: No obvious masses  Musculoskeletal: Severe pains at all sites.  Neurological: No specific neurologic disorders  psychological: Appropriate.  Skin: No pruritus. No skin lesions and no rash    Objective     Medical History:   Past Medical History:   Diagnosis Date    At risk for falls     De Quervain's tenosynovitis     Essential (primary) hypertension     Follicular carcinoma of thyroid (CMS/HCC)     Gastro-esophageal reflux     Hand ulceration (CMS/HCC)     Hyperlipidemia, unspecified     Interstitial lung disease (CMS/HCC)     Leg ulcer (CMS/HCC)     Lung nodule     Lupus (CMS/HCC)     O2 dependent     On 2L    Osteomyelitis of hand (CMS/HCC)     Osteoporosis     Pulmonary hypertension (CMS/HCC)     PVD (peripheral vascular disease) (CMS/HCC)     Raynaud's disease     Severe    Reflux esophagitis     Scleroderma (CMS/HCC)     Screening mammogram for breast cancer 05/04/2021    BI-RADS category: 1 - Negative (care everywhere @ [place])    Vertigo     Wound, open, foot     bilateral,dressing change with medihoney and mupirocin ointment by homecare nurse       Surgical History:   Past Surgical History   Procedure Laterality Date    ANGIOGRAM/ANGIOPLASTY FEMORAL , POSS. STENT/COIL/FEM-POP Bilateral 8/14/2024    Performed by Rashel Richards MD FACS at [facility] OR    Cardiac catheterization      Colonoscopy      Hernia repair      ION Navigational Bronchoscopy with Radial Probe and Fluoroscopy + EBUS N/A 8/8/2024    Performed by Joel Rodriguez MD at [facility] OR    RIGHT HEART CATH N/A 11/28/2022    Performed by Timothy Arvizu DO at [facility] CARDIAC CATH/EP    Thyroidectomy         Social History:   Social History     Social History Narrative    Not on file       Family History:   Family History   Problem Relation Name Age of Onset    Heart disease Biological Brother          stent    Breast cancer Niece      Cervical cancer Neg Hx      Uterine cancer Neg Hx      Colon cancer Neg Hx      Ovarian cancer Neg Hx         Allergies: Aspirin, Ibuprofen, Iodine, Iodine and iodide containing products, Morphine, Penicillins, Sulfa (sulfonamide  antibiotics), Clindamycin, Hydrochlorothiazide, Oxycodone, Sulfamethoxazole-trimethoprim, and Latex    Current Medications:  Current Outpatient Medications   Medication Sig Dispense Refill    amLODIPine (NORVASC) 5 mg tablet Take 10 mg by mouth every morning.      atorvastatin (LIPITOR) 40 mg tablet Take 1 tablet (40 mg total) by mouth daily. 90 tablet 3    benzonatate (TESSALON) 100 mg capsule Take 100 mg by mouth 3 (three) times a day as needed for cough.      biotin 1 mg tablet Take 1,000 mcg by mouth daily.      cholecalciferol, vitamin D3, 1,000 unit (25 mcg) tablet Take 1,000 Units by mouth daily.      clopidogreL (PLAVIX) 75 mg tablet Take 1 tablet (75 mg total) by mouth daily. 90 tablet 3    collagenase (SantyL) ointment Apply 1 Application topically daily.      cyanocobalamin, vitamin B-12, 1,000 mcg capsule Take 1,000 mcg by mouth daily.      docusate sodium (COLACE) 100 mg capsule Take 100 mg by mouth daily as needed for constipation.      ethacrynic acid (EDECRIN) 25 mg tablet Take 2 tablets (50 mg total) by mouth daily. 60 tablet 0    gabapentin (NEURONTIN) 100 mg capsule Take 2 capsules (200 mg total) by mouth 3 (three) times a day. 180 capsule 0    hydrocortisone 1 % cream Apply to affected area 2 times daily 15 g 0    levothyroxine (SYNTHROID) 100 mcg tablet Take 100 mcg by mouth daily. BRAND ONLY      loperamide (IMODIUM) 2 mg capsule Take 2 mg by mouth every 8 (eight) hours as needed for diarrhea.      meclizine (ANTIVERT) 25 mg tablet Take 25 mg by mouth daily as needed.      MEDIHONEY, HONEY, TOP Apply topically daily.      mupirocin (BACTROBAN) 2 % ointment Apply 1 application. topically daily. Behind ears      pantoprazole (PROTONIX) 40 mg EC tablet Take 1 tablet (40 mg total) by mouth daily Indications: gastroesophageal reflux disease. 30 tablet 0    sildenafiL, pulm.hypertension, (REVATIO) 20 mg tablet Take 1 tablet (20 mg total) by mouth 3 (three) times a day. 270 tablet 3    sodium chloride  (OCEAN) 0.65 % nasal spray Administer 2 sprays into each nostril daily.      tocilizumab (ACTEMRA) 200 mg/10 mL (20 mg/mL) solution Infuse 8 mg/kg into a venous catheter every 28 (twentyeight) days.      cyclobenzaprine (FLEXERIL) 5 mg tablet Take 1 tablet (5 mg total) by mouth 3 (three) times a day as needed for muscle spasms (/ chest pain). 90 tablet 0    honey (MEDIHONEY) 100 % paste Apply topically daily. 15 mL 0    lidocaine (ASPERCREME) 4 % adhesive patch,medicated topical patch Apply 1 patch topically daily as needed (pain). Remove & discard patch within 12 hours or as directed by prescriber. 30 patch 0    ondansetron (ZOFRAN) 4 mg tablet Take 1 tablet (4 mg total) by mouth every 8 (eight) hours as needed for nausea or vomiting for up to 7 days. 21 tablet 0    OPSUMIT 10 mg tablet tablet Take 1 tablet (10 mg total) by mouth daily. 30 tablet 0    polyethylene glycol (MIRALAX) 17 gram packet Take 17 g by mouth 2 (two) times a day as needed (constipation) for up to 5 days. 10 each 0     No current facility-administered medications for this visit.       Vital Signs:  10/3/2024    Physical Exam     General appearance: Very frail female appears older than stated age.  She was examined in wheelchair head: normocephalic, without obvious abnormality, atraumatic  Neck: There is limited neck mobility.  Carotids have good upstroke no carotid bruits  Heart: Regular and rhythmic without murmur gallop.  Lungs: clear to auscultation bilaterally.  She is on oxygen therapy currently.  Chest wall: no tenderness  Back: symmetric, no curvature. ROM normal. No CVA tenderness  Abdomen: soft, non-tender; bowel sounds normal; no masses, no organomegaly.  No hernias.  No intra-abdominal bruits.  Extremities: Upper extremities have easily palpable distal pulses.  The skin is tight and shiny.  There areas of amputation.  Lower extremities show palpable femoral pulses.  Distal pulses were nonpalpable on the right and very strongly  palpable in the posterior tibial artery on the left.  Skin is also shiny and tight.  There are areas of dry gangrene bilaterally.  Lower extremities much warmer than the right.  Pulses:  Femoral pulses are easily palpable.  Distal pulses are not palpable on the right and there is a strongly palpable left posterior tibial vessel.  Skin: Skin color, texture, turgor normal. No rashes or lesions  Neurologic: Grossly normal    Labs/Imaging     Labs  0 Result Notes               Component  Ref Range & Units 9/2/24 2245 9/2/24 1056 9/1/24 0940 8/31/24 0929 8/30/24 0913 8/29/24 1126 8/26/24 1211    Sodium  136 - 145 mEQ/L 139 140 140 140 140 142 140    Potassium  3.5 - 5.1 mEQ/L 4.2 4.0 4.0 4.6 4.9 4.2 4.8    Chloride  98 - 107 mEQ/L 104 103 103 104 108 High  109 High  108 High     CO2  21 - 31 mEQ/L 25 26 26 28 27 24 24    BUN  7 - 25 mg/dL 46 High  48 High  41 High  35 High  28 High  28 High  27 High     Creatinine  0.6 - 1.2 mg/dL 1.9 High  2.0 High  2.2 High  1.6 High  1.3 High  1.3 High  1.3 High     Glucose  70 - 99 mg/dL 105 High  115 High  109 High  83 76 125 High  83    Calcium  8.6 - 10.3 mg/dL 9.1 9.7 9.8 9.5 9.0 9.0 9.0    AST (SGOT)  13 - 39 IU/L 16          ALT (SGPT)  7 - 52 IU/L 11          Alkaline Phosphatase  34 - 125 IU/L 60          Total Protein  6.0 - 8.2 g/dL 6.8          Albumin  3.5 - 5.7 g/dL 3.6          Bilirubin, Total  0.3 - 1.2 mg/dL 0.3          eGFR  >=60.0 mL/min/1.73m*2 29.4 Low  27.6 Low  CM 24.6 Low  CM 36.1 Low  CM 46.3 Low  CM 46.3 Low  CM 46.3 Low  CM   Comment: Calculation based on the Chronic Kidney Disease Epidemiology Collaboration (CKD-EPI) equation refit without adjustment for race.    Anion Gap  3 - 15 mEQ/L 10 11 11 8 5 9 8                 Imaging  No new imaging results.    Assessment and Plan       Problem List Items Addressed This Visit       Wound of lower extremity - Primary    Relevant Orders    Ultrasound ENRRIQUE extremity    Ultrasound arterial leg bilateral    Ambulatory  referral to Podiatry    Peripheral arterial disease (CMS/[facility])    Current Assessment & Plan     60-year-old female status post aortogram and bilateral lower extremity angiography.  Intervention on the left was in the superficial femoral and tibioperoneal trunk with plain and drug-coated balloon angioplasty with excellent results.  Recommendation: Patient should be seen by podiatry for recommendations.  If she requires intervention on the right angiography with angioplasty can be performed.            Rashel Richards MD, FACS  Chief, Division of Vascular Surgery  [facility]      Thank you very much for allowing us to participate in the care of your patient.  I will keep you informed of Arielle Felix's care.  Please not hesitate to call or email if there are any questions.  I can be reached at [phone](mobile) or [email or web address].    Sincerely.    Kiran Richards

## 2024-10-04 ENCOUNTER — TELEPHONE (OUTPATIENT)
Dept: VASCULAR SURGERY | Facility: CLINIC | Age: 64
End: 2024-10-04

## 2024-10-04 NOTE — TELEPHONE ENCOUNTER
Patient is requesting for Dr. Richards to referral another podiatrist that accepts patient's insurance.

## 2024-10-07 DIAGNOSIS — I73.9 PERIPHERAL ARTERIAL DISEASE (CMS/HCC): ICD-10-CM

## 2024-10-07 DIAGNOSIS — S81.809A WOUND OF LOWER EXTREMITY, UNSPECIFIED LATERALITY, INITIAL ENCOUNTER: Primary | ICD-10-CM

## 2024-10-10 ENCOUNTER — TELEPHONE (OUTPATIENT)
Dept: CARDIOLOGY | Facility: CLINIC | Age: 64
End: 2024-10-10

## 2024-10-17 NOTE — TELEPHONE ENCOUNTER
Left a message to reschedule a missed f/u appt with Dr Arvizu, please call [phone]   Home number not working, left on cell

## 2024-10-21 NOTE — TELEPHONE ENCOUNTER
Pt called to accept the OV with Dr. Arvizu tomorrow 10/22 at 9:30AM as her daughter can bring her to appt. Please call pt to confirm OV.       Pt can be reached at [phone]

## 2024-10-21 NOTE — TELEPHONE ENCOUNTER
Ms Felix called back in asking if she could possibly come in closer to 10 am tomorrow instead of 9:30. Please advise.     Pt can be reached at [phone]

## 2024-10-21 NOTE — TELEPHONE ENCOUNTER
This visit was given to another patient when she didn't except it because she couldn't come in, I will call patient to notify

## 2024-10-21 NOTE — TELEPHONE ENCOUNTER
Spoke with patient, explained we would not be able to accommodate coming in any later for that appt, patient was understanding,  I told her I would look at the schedule and try to get her in at another time

## 2024-10-21 NOTE — TELEPHONE ENCOUNTER
Spoke with patient, explained that appt was given to another patient, and I would be in contact to schedule another follow up, and advised she reach out the the pulmnologist to see about a sooner appt as well, patient complained of SOB.

## 2024-10-21 NOTE — TELEPHONE ENCOUNTER
DARYAI-Called patient to reschedule her missed follow up appt.  Patient stated she was admitted at [place] due to SOB, when doing a biopsy, they dx her with lung cancer. Patient is obviously very upset and overwhelmed.  Offered her an appt for tomorrow, she is not sure when she will be able to come in.

## 2024-10-22 NOTE — TELEPHONE ENCOUNTER
Left  message, scheduled f/u for 11/19/24 @ 2:30, please call office if unable to keep this appointment, also placed on wait list to call if a sooner appt becomes available

## 2024-11-19 ENCOUNTER — TELEPHONE (OUTPATIENT)
Dept: CARDIOLOGY | Facility: CLINIC | Age: 64
End: 2024-11-19

## 2024-11-19 ENCOUNTER — OFFICE VISIT (OUTPATIENT)
Dept: CARDIOLOGY | Facility: CLINIC | Age: 64
End: 2024-11-19
Payer: COMMERCIAL

## 2024-11-19 DIAGNOSIS — I25.10 CORONARY ARTERY DISEASE INVOLVING NATIVE CORONARY ARTERY OF NATIVE HEART WITHOUT ANGINA PECTORIS: ICD-10-CM

## 2024-11-19 DIAGNOSIS — I10 ESSENTIAL (PRIMARY) HYPERTENSION: ICD-10-CM

## 2024-11-19 DIAGNOSIS — J90 PLEURAL EFFUSION: ICD-10-CM

## 2024-11-19 DIAGNOSIS — M34.9 SCLERODERMA (CMS/HCC): ICD-10-CM

## 2024-11-19 DIAGNOSIS — I73.9 PERIPHERAL ARTERIAL DISEASE (CMS/HCC): ICD-10-CM

## 2024-11-19 DIAGNOSIS — I27.20 PULMONARY HYPERTENSION (CMS/HCC): Primary | ICD-10-CM

## 2024-11-19 LAB
ATRIAL RATE: 119
P AXIS: 56
PR INTERVAL: 124
QRS DURATION: 70
QT INTERVAL: 320
QTC CALCULATION(BAZETT): 450
R AXIS: 10
T WAVE AXIS: 95
VENTRICULAR RATE: 119

## 2024-11-19 PROCEDURE — 93000 ELECTROCARDIOGRAM COMPLETE: CPT | Performed by: INTERNAL MEDICINE

## 2024-11-19 PROCEDURE — 3074F SYST BP LT 130 MM HG: CPT | Performed by: INTERNAL MEDICINE

## 2024-11-19 PROCEDURE — 3078F DIAST BP <80 MM HG: CPT | Performed by: INTERNAL MEDICINE

## 2024-11-19 PROCEDURE — 99215 OFFICE O/P EST HI 40 MIN: CPT | Performed by: INTERNAL MEDICINE

## 2024-11-19 PROCEDURE — 3008F BODY MASS INDEX DOCD: CPT | Performed by: INTERNAL MEDICINE

## 2024-11-19 RX ORDER — BUPRENORPHINE 5 UG/H
1 PATCH TRANSDERMAL
COMMUNITY
End: 2024-11-30 | Stop reason: HOSPADM

## 2024-11-19 RX ORDER — TIZANIDINE 2 MG/1
2 TABLET ORAL EVERY 6 HOURS PRN
COMMUNITY

## 2024-11-19 NOTE — TELEPHONE ENCOUNTER
Wilman Malhotra - Can you please arrange 3 month follow-up office visit?    Kendell - She continues having considerable pain. She reports Tizanidine, prescribed by [facility], was helpful and both her and her daughter state that she doesn't like opiates/opioids. I think there is a role her for the Palliative NPs to help with symptom relief as well as adjustment and framing with her multiple significant diagnoses. Would you be able to assist with engaging them for her care?    Thanks,   Timothy

## 2024-11-19 NOTE — LETTER
2024     Sara Simmons MD  [address] [facility] [address]  [facility] [address]    Patient: Arielle Felix  YOB: 1960  Date of Visit: 2024      Dear Dr. Simmons:    Thank you for referring Arielle Felix to me for evaluation. Below are my notes for this consultation.    If you have questions, please do not hesitate to call me. I look forward to following your patient along with you.         Sincerely,        Timothy Arvizu,         CC: DO Kai Roque MD Joseph Nicholas Bodor, MD Clark, John, DO  2024  7:39 AM  Signed  I had the pleasure of seeing Arielle Felix (: 1960) in the [facility] Heart Failure and Pulmonary Hypertension clinic on 2024. She presents today with her caregiver and daughter, Sara.     Ms. Felix is a 64 y.o. female with a past medical history of Pulmonary HTN, HTN, PAD s/p stent, Raynaud's Disease, Cutaneous Systemic Scleroderma (dx ), ILD, PE (3/2023), Adenocarcinoma of Lung (dx 10/2024). She is a patient of Dr. Nguyễn. She was previously followed by Dr. Jos Valdez [facility]. Echocardiogram from 2022 showed LVEF: 55-60%, mild aortic valve thickening, pulmonary artery systolic pressure: 52 mmHg and trivial pericardial effusion. LHC and RHC (separate occasions) over the last 5-10 years which showed normal hemodynamics and normal coronaries. She had a RHC 2020 showing top-normal right sided filling pressures with mild pulmonary hypertension, top-normal PVR and normal pulmonary capillary wedge pressure. The cardiac index was normal, seen below.     On 2022, she was in [facility] ER for chest pain. EKG: NSR without ischemic changes. hsTrop: 12->16, d-dimer: 0.56, CXR showed cardiomegaly and diffuse interstitial prominence.     Dr. Nguyễn ordered echocardiogram, which was completed on 2022 and showed estimated RVSP = 50-55 mmHg, normal-sized LV with normal LV systolic  function. Estimated EF 55- 60%. Wall motion grossly normal, mild concentric left ventricular hypertrophy, grade I LV diastolic dysfunction, probably normal RV size and function.    At her initial visit on 10/11/2022, she reported that she felt very short of breath with minimal activity most of the time. She reports that this started about 1 year prior and had progressively gotten worse. She described PND and bendopnea. She reported that she experienced ankle swelling, worsened over the past year and usually worse towards the end of the night. She also reported some swelling in her abdomen. She reported daily palpations. I recommended a RHC which was done 11/28/2022 and showed: Normal right sided filling pressures. Mildly elevated left sided filling pressures. Precapillary pulmonary hypertension with mean PA 36 mmHg.    She was hospitalized on 3/19/2023 at [facility] for PE diagnosed on V/Q scan. She reports that she had presented with left arm pain and heaviness. She states that she was started on Apixaban. One week after her last office visit (4/6/2023), she presented to [facility] with chest pain, epistaxis and hemoptysis. Echocardiogram showed: left ventricular cavity size normal with mild left ventricular hypertrophy and preserved systolic function. Estimated EF 65%. Chest CTA showed no evidence of PE; Apixaban was discontinued.     She had a repeat echocardiogram (6/2023) which showed: Normal left ventricular cavity size and mild concentric left ventricular hypertrophy. Normal systolic function. EF: 60%. Normal right ventricular structure and function. Mild tricuspid valve regurgitation with estimated RVSP: 55 mmHg. Compared to previous study from 4/14/2023, estimated right ventricular systolic pressure were higher.    At her last office visit on 7/25/2023, she reported that she was not sure if she received/started the Macitentan. She stated at the time we initiated it, her Pulmonologist also started  her on a new medication (Mycophenolate), and she had significant GI intolerance with this. It was trialed at various doses but ultimately stopped. At that visit, I asked her to check if she had Macitentan and if she did to start it and monitor her SpO2.    She underwent V/Q scan in September 2023, which showed intermediate-to-high probability of pulmonary embolic disease, as a result she completed CTA Chest PE which showed: no evidence for filling defect or vessel cutoff to suggest pulmonary embolism. Enlargement of the pulmonary arteries compatible with pulmonary arterial hypertension was seen.    She reports she started Macitentan August / September 2023.    She presented to [facility] on 3/3/2024 with chest pressure, palpitations and nausea. She was noted to have hypoxia and tachypnea at presentation. She was found to have moderate-to-large pericardial effusion without tamponade. She was started on Colchicine 0.6 mg BID on 3/4/2024 and when she did not improve symptomatically, Prednisone 20 mg daily was added on 3/5/2024. On 3/7/2024, she developed multiple episodes of diarrhea and Colchicine dose was decreased to 0.3 mg. Coronary CTA, done as part of the ischemic workup, which showed moderate 2 vessel CAD. She was recommended to begin Atorvastatin 20 mg daily and Clopidogrel 75 mg daily. She initially refused those two medications, but eventually agreed to take Atorvastatin.      She was discharged on Colchicine 0.3 mg for at least 3 months and Prednisone 20 mg for a total two week course until 3/19/2024, then decrease the dose to 10 mg daily for 2 weeks until 4/2/2024, then decrease dose to 5 mg daily for 2 weeks until 4/16/2024. She reports she tapered off Prednisone as instructed and has continued to take Colchicine as prescribed. She completed an echocardiogram (4/10/2024) which showed: Small-to-moderate sized, circumferential pericardial effusion. No echocardiographic evidence for cardiac tamponade. Compared to  "previous study from 3/4/2024, no significant change. Pericardial effusion size was similar, estimated right atrial pressure lower.      She presented to [facility] (4/16/2024) with increased pleuritic chest pain. On presentation to the ER, she was noted to be hypertensive and tachycardic. Labs showed mildly elevated troponin, elevated BNP and leukocytosis. CT angiography of the chest did not show evidence of PE. It was read as large pericardial effusion, evidence of emphysema, evidence of pulmonary hypertension, 16 mm left lobe lung mass.       She presented again to [facility] on 5/21/2024 at the request of her Rheumatologist. During her visit there, she was noted to be tachycardic and with some shortness of breath. Her chest pain was improved from her prior admission. On arrival, O2 saturations were normal. ECG showed: Sinus Tachycardia (HR: 125 bpm). BNP >300, hsTrop 32 ->24. POCUS in the emergency department showed evidence of pericardial effusion with no tamponade physiology. CTA Chest did not show evidence of pulmonary embolism. There were bilateral changes radiographically concerning for volume overload. She received Methylpredinsolone 125 mg in ED. Of note, she was on Prednisone 10 mg daily at home. She was given IV diuresis. She was discharged on Furosemide 20 mg PO PRN daily for fluid weight gain / swelling.     Since her last office visit on 6/20/2024, she had brief hospitalization in July for non-cardiac chest pain.     She underwent bronchoscopic lung biopsy on 8/8/2024, with Dr. Rodriguez. Pathology showed \"atypical glandular cells which may be consistent with adenocarcinoma in situ\". Post-procedure CXR showed increased pulmonary vascular congestion. She was admitted for optimization and was given intermittent doses of IV Ethacrynic acid. She was found to have bilateral lower extremity wounds and bilateral second digit dry gangrene. Podiatry and Vascular were consulted. She underwent LLE angio, balloon angio of distal " "SFA, TP trunk, DCB angio of popliteal artery on 8/14/2024. During admission she developed lower abdominal pain post-procedurally as well as intermittent nausea/vomiting. CT revealed a hematoma in the right inguinal region. Ultrasound of right groin showed probable right groin/pelvic hematoma. Her d/c weight was 114 lbs.     In October 2024, she presented to [facility] ED 10/6/2024 with LUQ pain, dyspnea & left chest pain. She underwent thoracentesis and was found to have large left-sided exudative pleural effusion. Pathology was reportedly consistent with Adenocarcinoma.     She was recommended follow up with [facility] for Heme/Onc care. She met with Dr. Baen several weeks ago and will be seeing him again on 11/21 to learn if she is candidate for PO biologic agent.     Today, she reports worsening shortness of breath, which she reports worsened about 1 month ago. She notes on-going LUQ and chest pain, which worsens with palpation. She denies light-headedness, dizziness, syncope or near syncope. She denies leg swelling or abdominal distension.     ---    Rheumatology: Dr. Trevizo  Pulmonology: Dr. Wasserman    Past Medical / Surgical History:  Pulmonary HTN, HTN, Raynaud's Disease, Cutaneous Systemic Scleroderma (dx 1998), ILD, PE (3/2023), Follicular Carcinoma of Thyroid, Tenosynovitis, de Quervain, h/o Hernia Repair, h/o Appendicitis, h/o Digit Amputation, H/o Total Thyroidectomy (Dr. Pacheco at [facility]; 4/2013), Adenocarcinoma of Lung (dx 10/2024)    Family & Social History: She is , she has two children. She has worked as an .     Tobacco: former 2005  EtOH: never   Illicits: never     Her brother has CAD with stent  1st cousin with SLE  Both parents had \"heart problems\"    Allergies:   Allergies   Allergen Reactions   • Aspirin Shortness of breath     Other reaction(s): Unknown  \"stop breathing\"     • Ibuprofen Shortness of breath     Stop breathing   • Iodine Shortness of breath     Other " reaction(s): Unknown   • Iodine And Iodide Containing Products Shortness of breath     ? rash   • Morphine Shortness of breath      Reported wamrth of face, improved with benadryl and cold compresses after getting morphine as well as episode of (subjective ) dyspnea, and thought she passed out - did have wamrth and erythema of face with mild edema R>L cheek notably on exam.    • Penicillins Shortness of breath     Other reaction(s): Unknown   • Sulfa (Sulfonamide Antibiotics) Angioedema   • Clindamycin    • Hydrochlorothiazide      Other reaction(s): Abdominal Pain   Slow heart rate   • Oxycodone      Other reaction(s): Vomiting   • Sulfamethoxazole-Trimethoprim      Other reaction(s): Vomiting, Weakness    • Latex Rash       Medications:   Current Outpatient Medications   Medication Sig Dispense Refill   • amLODIPine (NORVASC) 5 mg tablet Take 10 mg by mouth every morning.     • atorvastatin (LIPITOR) 40 mg tablet Take 1 tablet (40 mg total) by mouth daily. 90 tablet 3   • benzonatate (TESSALON) 100 mg capsule Take 100 mg by mouth 3 (three) times a day as needed for cough.     • biotin 1 mg tablet Take 1,000 mcg by mouth daily.     • buprenorphine (BUTRANS) 5 mcg/hour patch weekly Place 1 patch on the skin every (seven) 7 days.     • cholecalciferol, vitamin D3, 1,000 unit (25 mcg) tablet Take 1,000 Units by mouth daily.     • clopidogreL (PLAVIX) 75 mg tablet Take 1 tablet (75 mg total) by mouth daily. 90 tablet 3   • collagenase (SantyL) ointment Apply 1 Application topically daily.     • cyanocobalamin, vitamin B-12, 1,000 mcg capsule Take 1,000 mcg by mouth daily.     • docusate sodium (COLACE) 100 mg capsule Take 100 mg by mouth daily as needed for constipation.     • hydrocortisone 1 % cream Apply to affected area 2 times daily 15 g 0   • levothyroxine (SYNTHROID) 100 mcg tablet Take 100 mcg by mouth daily. BRAND ONLY     • loperamide (IMODIUM) 2 mg capsule Take 2 mg by mouth every 8 (eight) hours as needed  for diarrhea.     • meclizine (ANTIVERT) 25 mg tablet Take 25 mg by mouth daily as needed.     • MEDIHONEY, HONEY, TOP Apply topically daily.     • mupirocin (BACTROBAN) 2 % ointment Apply 1 application. topically daily. Behind ears     • ondansetron (ZOFRAN) 4 mg tablet Take 1 tablet (4 mg total) by mouth every 8 (eight) hours as needed for nausea or vomiting for up to 7 days. 21 tablet 0   • OPSUMIT 10 mg tablet tablet Take 1 tablet (10 mg total) by mouth daily. 30 tablet 0   • pantoprazole (PROTONIX) 40 mg EC tablet Take 1 tablet (40 mg total) by mouth daily Indications: gastroesophageal reflux disease. 30 tablet 0   • polyethylene glycol (MIRALAX) 17 gram packet Take 17 g by mouth 2 (two) times a day as needed (constipation) for up to 5 days. 10 each 0   • sildenafiL, pulm.hypertension, (REVATIO) 20 mg tablet Take 1 tablet (20 mg total) by mouth 3 (three) times a day. 270 tablet 3   • sodium chloride (OCEAN) 0.65 % nasal spray Administer 2 sprays into each nostril daily.     • tiZANidine (ZANAFLEX) 2 mg tablet Take 2 mg by mouth every 6 (six) hours as needed for muscle spasms.     • tocilizumab (ACTEMRA) 200 mg/10 mL (20 mg/mL) solution Infuse 8 mg/kg into a venous catheter every 28 (twentyeight) days.     • cyclobenzaprine (FLEXERIL) 5 mg tablet Take 1 tablet (5 mg total) by mouth 3 (three) times a day as needed for muscle spasms (/ chest pain). 90 tablet 0   • gabapentin (NEURONTIN) 100 mg capsule Take 2 capsules (200 mg total) by mouth 3 (three) times a day. 180 capsule 0   • honey (MEDIHONEY) 100 % paste Apply topically daily. 15 mL 0   • lidocaine (ASPERCREME) 4 % adhesive patch,medicated topical patch Apply 1 patch topically daily as needed (pain). Remove & discard patch within 12 hours or as directed by prescriber. 30 patch 0     No current facility-administered medications for this visit.       Review of Systems:   All other systems reviewed and are negative except as per HPI.    Physical Exam:     Wt  Readings from Last 10 Encounters:   11/19/24 49.9 kg (110 lb)   10/03/24 53.5 kg (118 lb)   09/03/24 52.8 kg (116 lb 8 oz)   08/05/24 49.4 kg (109 lb)   08/05/24 49.4 kg (109 lb)   07/20/24 49.5 kg (109 lb 2 oz)   07/19/24 51.3 kg (113 lb)   06/20/24 51.7 kg (114 lb)   06/14/24 52.6 kg (116 lb)   05/23/24 52.4 kg (115 lb 9.6 oz)       BP Readings from Last 5 Encounters:   11/19/24 120/72   10/03/24 128/76   09/03/24 120/79   08/05/24 (!) 148/75   07/25/24 (!) 155/77       Vitals:    11/19/24 1506   BP: 120/72   Pulse: (!) 119         Body mass index is 17.75 kg/m².  Body surface area is 1.52 meters squared.    Constitutional: NAD, AAOx3  HENT: Normocephalic, atraumatic head, sclerae anicteric, no cervical lymphadenopathy, trachea midline  Cardiovascular: RRR, no murmurs, rubs or gallops, JVP: 8-9 cm H2O   cm H2O, no carotid bruits.  Respiratory: CTA bilateral lung fields, no wheezes, rales or rhonchi  GI: soft, non-tender/non-distended  Musculoskeletal / Extremities: trace peripheral edema, +2 pulses bilateral radial, DP/PT arteries, skin tightening on face and fingertips  Skin: no rashes  Neuro / Psych: no focal deficits, CNII-XII grossly intact, appropriate, cooperative    Diagnostic Data:             Thoracentesis Pathology (10/8/2024): Adenocarcinoma of lung/pulmonary origin present in a background of acute and chronic inflammation     Component      Latest Ref Rng 7/25/2024 8/26/2024 9/2/2024   BNP      <=100 pg/mL 220 (H)  315 (H)  80        Component      Latest Ref Rng 4/18/2024 5/24/2024   WBC      3.80 - 10.50 K/uL 8.40  16.22 (H)    RBC      3.93 - 5.22 M/uL 3.54 (L)  3.82 (L)    Hemoglobin      11.8 - 15.7 g/dL 9.8 (L)  10.3 (L)    Hematocrit      35.0 - 45.0 % 32.8 (L)  33.9 (L)    Platelets      150 - 369 K/uL 254  289       Component      Latest Ref Rng 5/24/2024   Magnesium      1.8 - 2.5 mg/dL 2.1      Component      Latest Ref Rng 4/13/2023 3/7/2024 5/22/2024   TSH      0.34 - 5.60 mIU/L 4.71  0.28  (L)  1.03      Component      Latest Ref Rng 5/24/2024   Sodium      136 - 145 mEQ/L 138    Potassium, Bld      3.5 - 5.1 mEQ/L 4.4    Chloride      98 - 107 mEQ/L 104    CO2      21 - 31 mEQ/L 24    BUN      7 - 25 mg/dL 40 (H)    Creatinine      0.6 - 1.2 mg/dL 1.1    Glucose      70 - 99 mg/dL 87    Calcium      8.6 - 10.3 mg/dL 9.5    eGFR      >=60.0 mL/min/1.73m*2 56.6 (L)    Anion Gap      3 - 15 mEQ/L 10      Component      Latest Ref Rng 4/13/2023   Hemoglobin A1C      <5.7 % 4.4        Component      Latest Ref Rng 4/14/2023   Triglycerides      30 - 149 mg/dL 44    Cholesterol      <=200 mg/dL 194    HDL      >=55 mg/dL 49 (L)    LDL Calculated      <=100 mg/dL 136 (H)    Non-HDL, Calculated      mg/dL 145    RISK      <=5.0  4.0       Component      Latest Ref Rng 4/13/2023   High Sens Troponin I      <15.0 pg/mL 23.0 (H)       Component      Latest Ref Rng 4/14/2023   Ferritin      11 - 250 ng/mL 75      Component      Latest Ref Rng 4/14/2023   Iron      35 - 150 ug/dL 29 (L)    TIBC      270 - 460 ug/dL 245 (L)    UIBC      180 - 360 ug/dL 216    Iron Saturation      15 - 45 % 12 (L)        Component      Latest Ref Rng 6/27/2022 4/13/2023   BNP      <=100 pg/mL 111 (H)  105 (H)                          ---    ECG (11/19/2024, personally reviewed):   Sinus tachycardia   Septal infarct   HR: 119 bpm     ---    TTE (10/8/2024, outside study):  •  A complete transthoracic echocardiogram (including 2D, color flow  Doppler, spectral Doppler and M-mode imaging) was performed using the  standard protocol.  •  The left ventricle is normal in size. There is moderately increased  wall thickness consistent with moderate concentric hypertrophy. Normal  left ventricular ejection fraction. The left ventricular ejection fraction  by visual estimate is 60%.  •  Grade II (moderate) LV diastolic dysfunction.  •  The right ventricle is normal in size. There is normal function of the  right ventricle.  •  The left atrium  is mildly dilated.  •  The right atrium is mildly dilated.  •  There is trivial mitral valve leaflet thickening. There is trivial  mitral valve leaflet calcification. There is trace mitral regurgitation.  There is no mitral stenosis.  •  Aortic valve not well visualized. The aortic valve is trileaflet. There  is mild aortic valve thickening. There is mild aortic valve calcification.  There is no aortic stenosis. There is trace aortic regurgitation.  •  The tricuspid valve is normal in structure. There is mild tricuspid  regurgitation. Pulmonary artery systolic pressure measures 47 mmHg. The  pulmonary artery systolic pressure is moderately elevated.  •  The inferior vena cava is normal in size (diameter <21 mm) and  decreases >50% in size with inspiration, suggesting a normal right atrial  pressure of 3 mmHg (range 0-5 mm Hg).  •  Compared to the prior study of 4/21/2022, there are no significant  changes.      PET/CT Skull-to-Thigh (6/4/2024):   An approximately 1.6 cm pulmonary nodule at the medial left lung base is  significantly FDG avid at max SUV 8.5.  This is nonspecific and can be seen in  the setting of benign pathology however malignancy is a concern.     There is more mild to moderate nonspecific uptake localizing to lita-fissural  nodules particularly on the left.     There is  moderate nonspecific left hilar and mediastinal dawson uptake.     No additional suspicious FDG avid findings appreciated.     CTA Chest (5/21/2024):   IMPRESSION:  1.  No evidence of acute pulmonary embolism.  2.  Multiple new perifissural left lower lobe nodules, suspicious for a  neoplastic process. PET/CT and or tissue sampling is recommended.  3.  Chronic interstitial lung disease in an NSIP pattern, with overall slightly  progressed appearance since March 2023. Pulmonary consultation is recommended.  4.  Stable cardiomegaly and large pericardial effusion.  5.  Mediastinal and supraclavicular adenopathy, similar to prior  study, also  indeterminate, which could be further evaluated at time of PET/CT.     CTA Chest (4/16/2024):   IMPRESSION:  1. No evidence of pulmonary embolism.  2. Stable cardiomegaly and large pericardial effusion. Cardiology consultation  suggested.  3. Emphysema with bilateral lower lobe groundglass opacity and bronchiectasis in  an NSIP pattern, which can be seen with scleroderma. Pulmonary consultation  suggested.  4. Stable 16 mm left lower lobe masslike density which may represent  atelectasis, lung cancer or lymphoma. When clinically appropriate PET/CT  suggested.  5. Stable prominence of the main pulmonary artery which can be seen with  pulmonary hypertension     TTE (5/23/2024, personally reviewed):  Mild increased wall thickness with normal left ventricular cavity and preserved systolic function. EF 65%. No wall motion abnormalities.   Normal right ventricular size with preserved systolic function.   Tiny IVC with >50% respiratory variation consistent with low-normal right sided filling pressures.   Small pericardial effusion, predominantly adjacent to right atrium. No evidence of hemodynamic compromise with normal respiratory variation and no chamber collapse. Prominent epicardial fat.   Compared with previous study of 4/10/24, small pericardial effusion persists.      TTE (4/10/2024, personally reviewed):  1. Normal left ventricular cavity size with mild concentric left ventricular hypertrophy. Normal systolic function. EF: 60%. No regional wall motion abnormalities. Cannot determine diastolic function. Global longitudinal strain not reported due to technical limitations of study.   2. Aortic valve has three cusps. Trace aortic regurgitation. Aortic valve and root sclerosis.  3. Thickened mitral valve leaflets. Trace mitral valve regurgitation. Normal left atrial size.   4. Normal right ventricular structure and function. Trace tricuspid valve regurgitation with estimated RVSP: 51 mmHg (assuming right  atrial pressure of 3 mmHg). Normal right atrial size. Pulmonic valve structurally normal. Trace pulmonic valve regurgitation. Pulsed wave Doppler of the RVOT systolic profile shows decreased acceleration times and geometry consistent with mildly elevated PVR.  5. IVC size is normal with > 50% inspiratory collapse consistent with normal right atrial pressure. Small-to-moderate sized, circumferential pericardial effusion. No echocardiographic evidence for cardiac tamponade.   6. Compared to previous study from 3/4/2024, no significant change. Pericardial effusion size is similar, estimated right atrial pressure lower.           CTA Abd / Pelvis (3/13/2024, personally reviewed):   There is dilatation of the of proximal/mid small bowel with relatively abrupt  transition left hemiabdomen, likely obstruction. Questionable lesion at the  transition point. This could be better evaluated with CT or MRI enterography.     Aorta and major branches patent. Superior mesenteric artery patent without  significant stenosis.  Suspect moderate stenosis of the origins of the of renal arteries bilaterally.     Large pericardial effusion, no change.     Patchy opacities lower lungs, similar to prior.      Coronary CTA (3/11/2024):   1. Two-vessel coronary artery disease as above that is moderate in severity.  CT  FFR is normal..  2. There is mitral annular calcification and aortic sclerosis.  The right atrium  is enlarged.  There is left ventricular hypertrophy.  There is a moderate to  large circumferential pericardial effusion.  3. Normal left ventricular wall motion and systolic function.  4. Coronary calcium score 313, 96th percentile     TTE (3/4/2024, personally reviewed):   Normal-sized left ventricle. Mild left ventricular hypertrophy. Preserved systolic function. LVEF 60%. No regional wall motion abnormalities. Grade II diastolic dysfunction.  Normal global longitudinal strain (-20%).  The aortic valve is not well seen.  Cannot  determine the number of cusps.  Sclerotic leaflets.  No aortic stenosis.  Trace aortic insufficiency.  Normal sized aortic root.  The visualized portion of the ascending aorta is normal in size.  The mitral valve leaflets are thickened. Mild mitral annular calcification. Trace mitral regurgitation.   Mildly dilated left atrium.  Normal-sized right ventricle. Normal right ventricular systolic function.  Thickened tricuspid valve. There is mild tricuspid regurgitation with estimated RVSP of 46 mmHg.   Pulmonic valve is not well visualized. Trace pulmonic regurgitation.   Mildly dilated right atrium.  IVC is enlarged with <50% respiratory variation consistent with elevated right atrial filling pressure (at least 15 mmHg).   Moderate, circumferential, circumferential pericardial effusion.  The largest pocket is located inferolaterally.  Elevated right atrial pressure.  No other clear echocardiographic features of increased pericardial pressure.  Correlate clinically.   Compared to previous TTE from 6/21/2023, pericardial effusion now moderate in size.  Right atrial pressure now elevated.         CTA Chest (3/3/2024):   IMPRESSION:  1.  No evidence of acute pulmonary embolism to the level of the segmental  branches.  2.  Moderate to large pericardial effusion measuring higher than simple fluid  density.  3.  Lower lobe lung field predominant interstitial thickening with some relative  subpleural sparing, consistent with a chronic interstitial lung disease,  unchanged from the prior study.  4.  Continued bilateral axillary, mediastinal, and bilateral hilar  lymphadenopathy, not definitely changed from the prior study, possibly related  to the patient's sarcoid.  5.  Emphysema.      CTA Chest PE (10/4/2023):  IMPRESSION:  1. No evidence for filling defect or vessel cutoff to suggest pulmonary  embolism.  Enlargement of the pulmonary arteries compatible with pulmonary  arterial hypertension.  2. Changes throughout the lungs  as described above which can be seen with  systemic sclerosis/scleroderma.  Underlying pneumonia the lung bases cannot be  entirely excluded in the proper clinical setting.  3.  Additional stable findings as described above.        VQ (9/12/2023):  IMPRESSION:  Intermediate to high probability of pulmonary embolic disease.     6MWT (7/25/2023, on Sildenafil 20 mg TID):        TTE (6/21/2023, personally reviewed):  1. Normal left ventricular cavity size and mild concentric left ventricular hypertrophy. Normal systolic function. EF: 60%. No regional wall motion abnormalities. Grade I diastolic dysfunction.   2. Aortic valve cusps not well visualized. Trace aortic regurgitation. Aortic valve and root sclerosis.  3. Thickened mitral valve leaflets. Mild mitral annular calcification. Trace mitral regurgitation. Mildly dilated left atrial size.   4. Normal right ventricular structure and function. Mild tricuspid valve regurgitation with estimated RVSP: 55 mmHg (assuming right atrial pressure of 3 mmHg). Normal right atrial size. Pulmonic valve not well visualized. Trace pulmonic valve regurgitation. Pulsed wave Doppler of the RVOT systolic profile show decreased acceleration times ( msec) and late systolic notching consistent with elevated PVR.   5. IVC size is normal with > 50% inspiratory collapse consistent with normal right atrial pressure. Small pericardial effusion without echocardiographic evidence of tamponade.  6. Compared to previous study from 4/14/2023, estimated right ventricular systolic pressure is higher.        TTE (4/14/2023, personally reviewed):   The left ventricular cavity size is normal with mild left ventricular hypertrophy and preserved systolic function. Estimated EF 65%. No regional wall motion abnormalities. Grade I diastolic dysfunction.     The aortic valve is tricuspid. Aortic valve sclerosis. Trace aortic regurgitation.      The visualized portions of the aortic root and ascending  aorta are normal in size.     The mitral valve is mildly thickened. Mild mitral annular calcification. Trace mitral regurgitation.       The left atrium is severely dilated.      The right ventricle is normal in size with preserved systolic function     The pulmonic valve is not well seen.     The tricuspid valve is normal in appearance. mild tricuspid regurgitation with an estimated RVSP of 34 mmHg.       Right atrium is normal in size.     The IVC is small and collapses > 50% with inspiration consistent with normal right atrial pressures.     Small pericardial effusion, mostly posterior.       Compared to previous echocardiogram from 8/22/2022, there is no significant change        TTE (11/28/2022, personally reviewed):   Normal right- and mildly elevated left-sided filling pressures (RA: 3 mmHg, PCWP: 13 mmHg)  Elevated pulmonary artery pressures (60/22(35 mmHg)) in the setting of PCWP: 13 mmHg.   Normal cardiac output and index (CO: 5.1 L/min, CI: 3.2 L/min/m2)  PVR: 4.3 Wood units. SVR: 1840 dynes/sec/cm5      TTE (8/22/2022, personally reviewed):  Normal-sized LV. Normal LV systolic function. Estimated EF 55- 60%. Wall motion appears grossly normal. Mild concentric left ventricular hypertrophy. Grade I LV diastolic dysfunction.  Pulmonic valve not well visualized. Grossly normal pulmonic valve structure. Trace pulmonic valve regurgitation. No pulmonic valve stenosis.  Aortic valve not well visualized. Aortic valve not well seen but likely trileaflet. Focal aortic valve calcification present. Sclerotic aortic valve leaflets. Mild aortic valve regurgitation. No aortic valve stenosis.  RV not well visualized. Normal-sized RV. Normal RV systolic function.  Normal-sized RA.  There is a small loculated pericardial effusion anterior to the right atrium. No cardiac tamponade.  Sclerotic mitral valve. Mitral valve calcification of the anterior leaflet present.  Trace mitral valve regurgitation.  No significant mitral  valve stenosis.  Normal-sized IVC. IVC demonstrates normal respiratory collapse.  Tricuspid valve structure is grossly normal. Mild to moderate tricuspid valve regurgitation.  Estimated RVSP = 50-55 mmHg.  Mildly dilated LA.  No previous echocardiogram available for comparison.     TTE (4/21/2022, outside study):  This result has an attachment that is not available.   •  A complete transthoracic echocardiogram (including 2D, color flow   Doppler, spectral Doppler and M-mode imaging) was performed using the   standard protocol. The study quality was adequate.   •  The left ventricle is normal in size. There is moderately increased   wall thickness consistent with moderate concentric hypertrophy.   Endocardium is incompletely visualized, but no regional wall motion   abnormalities are noted in the visualized segments. Normal left   ventricular ejection fraction. The left ventricular ejection fraction by   visual estimate is 55% to 60%.   •  The right ventricle is normal in size. There is normal function of the   right ventricle.   •  The left atrium is normal in size. Left atrial volume is 58 mL.   •  The right atrial volume measures 52 mL. The right atrial volume index   measures 34 mL/m2.   •  There is trace mitral regurgitation. There is no mitral stenosis.   •  The aortic valve is trileaflet. There is mild aortic valve thickening.   There is no aortic stenosis. There is trace aortic regurgitation.   •  There is mild to moderate tricuspid regurgitation. Pulmonary artery   systolic pressure measures 52 mmHg. The pulmonary artery systolic pressure   is moderately elevated.   •  The aorta measures 3.2 cm at the sinus of Valsalva. The proximal   segment of the ascending aorta is mildly enlarged. The proximal segment of   the ascending aorta measures 3.4 cm. The proximal segment of the ascending   aorta measures 2.2 cm/m2, indexed for body surface area.   •  Trivial pericardial effusion present. There is no  echocardiographic   evidence of cardiac tamponade.   •  The inferior vena cava is normal in size (diameter <21 mm) and   decreases >50% in size with inspiration, suggesting a normal right atrial   pressure of 3 mmHg (range 0-5 mm Hg).   •  Compared to the prior study of 3/13/2020, there are no significant   changes.        PFT (3/23/2022):      PFT (7/14/2021):      CT Chest (5/2/2021, outside study):  CLINICAL INFORMATION: Systemic sclerosis. Interstitial lung disease. Groundglass nodule     PROCEDURE: Unenhanced CT of the chest was performed using thin section reconstructions. Images were obtained on inspiration and expiration.     COMPARISON: June and August 2020     FINDINGS:     LUNGS, PLEURA:     Groundglass opacities with reticulation with subpleural sparing in the lower lobes, without honeycombing or architectural distortion, unchanged.     Right upper lobe groundglass nodule, image 114 of series 3, 8 x 11 mm, previously 6 mm.     Expiratory images are of diagnostic quality and do not demonstrate evidence of clinically significant air trapping.     CARDIOVASCULAR, MEDIASTINUM, THYROID: Coronary calcifications. Trace pericardial effusion.     LYMPH NODES: Subcentimeter mediastinal lymph nodes, unchanged. Unchanged axillary lymph nodes.     SKELETON, CHEST WALL: No destructive bone lesion     UPPER ABDOMEN: Patulous esophagus. 3 mm right renal stone.       RHC (9/02/2020):  RA   8 mmHg   RV   40/5 mmHg   PA (mean)  44/16 (25) mmHg   PCWP   12 mmHg   CO   4.65 lpm (Thermodilution)   CI   2.65 lpm/m-squared   SVI    33 ml/beat/m-squared (lower limit normal 29)   PVR   2.8 mmHg/l/min (Wood units)   SVR   2064 dyne-sec-cm-5         PFT (3/18/2020):      TTE (3/13/2020, outside study):  · The study quality was good.   · The left ventricle is normal in size. Top normal left ventricular wall   thickness. There are no segmental wall motion abnormalities. The left   ventricular ejection fraction is 55-60% by visual  estimate and 3D   echocardiography. Normal left ventricular ejection fraction.   · There is grade I (mild) LV diastolic dysfunction.   · The right ventricle is normal in size. There is normal function of the   right ventricle.   · The right atrium is mildly dilated.   · There is mild mitral valve anterior leaflet thickening. There is mild   mitral valve leaflet calcification. There is flattening of the mitral   leaflets without raphael prolapse. There is trace mitral regurgitation.   · The aortic valve is trileaflet. There is mild thickening of the aortic   valve. There is mild calcification of the aortic valve. There is no aortic   /stenosis. There is trace aortic regurgitation.   · There is mild tricuspid valve leaflet thickening. There is mild to   moderate tricuspid regurgitation. The pulmonary artery systolic pressure   is moderately elevated. Pulmonary artery systolic pressure measures 50   mmHg. There is mid-systolic notching of the RVOT VTI consistent with   elevated pulmonary vascular resistance.   · The inferior vena cava is normal in size (diameter <21 mm) and decreases   >50% in size with inspiration, suggesting a normal right atrial pressure   of 3 mmHg (range 0-5 mm Hg).   · Trivial loculated pericardial effusion present adjacent to the right   ventricle and adjacent to the right atrium.          Assessment / Plan:     1. Shortness of Breath: She is intravascularly near euvolemic on exam today. Lung sounds are absent in left lung base to mid-left lung. I suspect that she has reaccumulated left-sided pleural effusion. I have offered her the options today of either transfer to the Emergency Department or set-up with Interventional Radiology as soon as tomorrow. She reports that she would like to present to her [place] outpatient appointment on 11/21 and has declined both of these options at this time. She is hemodynamically near her baseline (I.e.: tachycardic, normotensive with exam findings for normal  cardiac filling pressures. O2 saturations appropriate on current oxygen supplementation). I have offered to help coordinate Interventional Radiology appointment after her [facility] appointment, if she desires. Her daughter will plan to call us with updates after that visit.     2. Chest Pain: She has on-going, reproducible non-cardiac/atypical chest pains. Suspect that this may be related to inflammation +/- adenocarcinoma. She reports that Tizanidine (prescribed at [facility]) helped with pain, and she does not like taking opiates/opioids. I believe that there is an opportunity for Palliative assistance here. I suspect that she will be getting some Palliative support through [facility]. I have also asked our home-based Palliative NP team to engage with Ms. Felix and her daughter for support opportunities.     3. Pulmonary Hypertension (WHO Group I, NYHA/WHO FC III): Occurring in the background of Systemic Sclerosis with ILD. Proven with 11/2022 RHC showing mean PA 35 mmHg (with PCWP 13 mmHg) and PVR 4.3 Wood units. Recent TTEs (including 10/2024) have shown appropriate RV:PA coupling with normal RV size and systolic function. Estimated RVSP on TTE remains elevated.     Intravascularly, she is near euvolemic on exam today. She will continue Sildenafil 20 mg TID and Macitentan 10 mg daily for combination therapy.     4. HTN: Controlled. She is continued on Amlodipine.     5. Systemic Sclerosis / Raynaud's Disease: Per Rheumatology (Dr. Trevizo). She has received some doses of Tocilizumab. She has recently had site reaction with SQ delivery.      6. ILD: Per Pulmonology and Rheumatology (Dr. Trevizo). She has not tolerated trials of Mycophenolate.     7. PE: She has had elevated probability on V/Q scans, but CTA Chest has consistently not shown evidence for acute or chronic thromboembolism. Apixaban has been discontinued.     8. CAD: Two-vessel non-obstructive disease on March 2024 CCTA. She is continued on Atorvastatin 40  "mg daily and Clopidogrel 75 mg daily.       Thank you for allowing me to participate in the care of your patient. I would like to see her in the office in 3 months.     Please do not hesitate to contact me with any questions or issues.     Sincerely,     Timothy Arvizu, DO  Cardiology / Heart Failure / Pulmonary Hypertension    [email or web address]    [phone]    [facility]  [address]   [place] [id number]      Counseling and coordination of care consisted of more than 50% of this complex medical evaluation; numerous diagnostic and management options were considered and testing results were reviewed and discussed with the patient as best as able, during this encounter; the patient has multiple medical problems and is on numerous medications; the patient has a high risk of complications due to their cardiac disease, and all these resulted in the assigned \"complex medical decision-making\" characterization and coding of this patient encounter.  I spent  43 minutes on this date of service performing the following activities: obtaining history, performing examination, entering orders, documenting, preparing for visit, obtaining / reviewing records, providing counseling and education and independently reviewing study/studies.  "

## 2024-11-19 NOTE — PROGRESS NOTES
I had the pleasure of seeing Arielle Felix (: 1960) in the [facility] Heart Failure and Pulmonary Hypertension clinic on 2024. She presents today with her caregiver and daughter, Sara.     Ms. Felix is a 64 y.o. female with a past medical history of Pulmonary HTN, HTN, PAD s/p stent, Raynaud's Disease, Cutaneous Systemic Scleroderma (dx ), ILD, PE (3/2023), Adenocarcinoma of Lung (dx 10/2024). She is a patient of Dr. Nguyễn. She was previously followed by Dr. Jos Valdez at [facility]. Echocardiogram from 2022 showed LVEF: 55-60%, mild aortic valve thickening, pulmonary artery systolic pressure: 52 mmHg and trivial pericardial effusion. LHC and RHC (separate occasions) over the last 5-10 years which showed normal hemodynamics and normal coronaries. She had a RHC 2020 showing top-normal right sided filling pressures with mild pulmonary hypertension, top-normal PVR and normal pulmonary capillary wedge pressure. The cardiac index was normal, seen below.     On 2022, she was in [facility] ER for chest pain. EKG: NSR without ischemic changes. hsTrop: 12->16, d-dimer: 0.56, CXR showed cardiomegaly and diffuse interstitial prominence.     Dr. Nguyễn ordered echocardiogram, which was completed on 2022 and showed estimated RVSP = 50-55 mmHg, normal-sized LV with normal LV systolic function. Estimated EF 55- 60%. Wall motion grossly normal, mild concentric left ventricular hypertrophy, grade I LV diastolic dysfunction, probably normal RV size and function.    At her initial visit on 10/11/2022, she reported that she felt very short of breath with minimal activity most of the time. She reports that this started about 1 year prior and had progressively gotten worse. She described PND and bendopnea. She reported that she experienced ankle swelling, worsened over the past year and usually worse towards the end of the night. She also reported some swelling in her abdomen. She  reported daily palpations. I recommended a RHC which was done 11/28/2022 and showed: Normal right sided filling pressures. Mildly elevated left sided filling pressures. Precapillary pulmonary hypertension with mean PA 36 mmHg.    She was hospitalized on 3/19/2023 at [facility] for PE diagnosed on V/Q scan. She reports that she had presented with left arm pain and heaviness. She states that she was started on Apixaban. One week after her last office visit (4/6/2023), she presented to [facility] with chest pain, epistaxis and hemoptysis. Echocardiogram showed: left ventricular cavity size normal with mild left ventricular hypertrophy and preserved systolic function. Estimated EF 65%. Chest CTA showed no evidence of PE; Apixaban was discontinued.     She had a repeat echocardiogram (6/2023) which showed: Normal left ventricular cavity size and mild concentric left ventricular hypertrophy. Normal systolic function. EF: 60%. Normal right ventricular structure and function. Mild tricuspid valve regurgitation with estimated RVSP: 55 mmHg. Compared to previous study from 4/14/2023, estimated right ventricular systolic pressure were higher.    At her last office visit on 7/25/2023, she reported that she was not sure if she received/started the Macitentan. She stated at the time we initiated it, her Pulmonologist also started her on a new medication (Mycophenolate), and she had significant GI intolerance with this. It was trialed at various doses but ultimately stopped. At that visit, I asked her to check if she had Macitentan and if she did to start it and monitor her SpO2.    She underwent V/Q scan in September 2023, which showed intermediate-to-high probability of pulmonary embolic disease, as a result she completed CTA Chest PE which showed: no evidence for filling defect or vessel cutoff to suggest pulmonary embolism. Enlargement of the pulmonary arteries compatible with pulmonary arterial hypertension was  seen.    She reports she started Macitentan August / September 2023.    She presented to [facility] on 3/3/2024 with chest pressure, palpitations and nausea. She was noted to have hypoxia and tachypnea at presentation. She was found to have moderate-to-large pericardial effusion without tamponade. She was started on Colchicine 0.6 mg BID on 3/4/2024 and when she did not improve symptomatically, Prednisone 20 mg daily was added on 3/5/2024. On 3/7/2024, she developed multiple episodes of diarrhea and Colchicine dose was decreased to 0.3 mg. Coronary CTA, done as part of the ischemic workup, which showed moderate 2 vessel CAD. She was recommended to begin Atorvastatin 20 mg daily and Clopidogrel 75 mg daily. She initially refused those two medications, but eventually agreed to take Atorvastatin.      She was discharged on Colchicine 0.3 mg for at least 3 months and Prednisone 20 mg for a total two week course until 3/19/2024, then decrease the dose to 10 mg daily for 2 weeks until 4/2/2024, then decrease dose to 5 mg daily for 2 weeks until 4/16/2024. She reports she tapered off Prednisone as instructed and has continued to take Colchicine as prescribed. She completed an echocardiogram (4/10/2024) which showed: Small-to-moderate sized, circumferential pericardial effusion. No echocardiographic evidence for cardiac tamponade. Compared to previous study from 3/4/2024, no significant change. Pericardial effusion size was similar, estimated right atrial pressure lower.      She presented to [facility] (4/16/2024) with increased pleuritic chest pain. On presentation to the ER, she was noted to be hypertensive and tachycardic. Labs showed mildly elevated troponin, elevated BNP and leukocytosis. CT angiography of the chest did not show evidence of PE. It was read as large pericardial effusion, evidence of emphysema, evidence of pulmonary hypertension, 16 mm left lobe lung mass.       She presented again to [facility] on 5/21/2024 at the request  "of her Rheumatologist. During her visit there, she was noted to be tachycardic and with some shortness of breath. Her chest pain was improved from her prior admission. On arrival, O2 saturations were normal. ECG showed: Sinus Tachycardia (HR: 125 bpm). BNP >300, hsTrop 32 ->24. POCUS in the emergency department showed evidence of pericardial effusion with no tamponade physiology. CTA Chest did not show evidence of pulmonary embolism. There were bilateral changes radiographically concerning for volume overload. She received Methylpredinsolone 125 mg in ED. Of note, she was on Prednisone 10 mg daily at home. She was given IV diuresis. She was discharged on Furosemide 20 mg PO PRN daily for fluid weight gain / swelling.     Since her last office visit on 6/20/2024, she had brief hospitalization in July for non-cardiac chest pain.     She underwent bronchoscopic lung biopsy on 8/8/2024, with Dr. Rodriguez. Pathology showed \"atypical glandular cells which may be consistent with adenocarcinoma in situ\". Post-procedure CXR showed increased pulmonary vascular congestion. She was admitted for optimization and was given intermittent doses of IV Ethacrynic acid. She was found to have bilateral lower extremity wounds and bilateral second digit dry gangrene. Podiatry and Vascular were consulted. She underwent LLE angio, balloon angio of distal SFA, TP trunk, DCB angio of popliteal artery on 8/14/2024. During admission she developed lower abdominal pain post-procedurally as well as intermittent nausea/vomiting. CT revealed a hematoma in the right inguinal region. Ultrasound of right groin showed probable right groin/pelvic hematoma. Her d/c weight was 114 lbs.     In October 2024, she presented to [facility] ED 10/6/2024 with LUQ pain, dyspnea & left chest pain. She underwent thoracentesis and was found to have large left-sided exudative pleural effusion. Pathology was reportedly consistent with Adenocarcinoma.     She was recommended " "follow up with Lukasz Sigala at [facility] for Heme/Onc care. She met with Dr. Bean several weeks ago and will be seeing him again on 11/21 to learn if she is candidate for PO biologic agent.     Today, she reports worsening shortness of breath, which she reports worsened about 1 month ago. She notes on-going LUQ and chest pain, which worsens with palpation. She denies light-headedness, dizziness, syncope or near syncope. She denies leg swelling or abdominal distension.     ---    Rheumatology: Dr. Trevizo  Pulmonology: Dr. Wasserman    Past Medical / Surgical History:  Pulmonary HTN, HTN, Raynaud's Disease, Cutaneous Systemic Scleroderma (dx 1998), ILD, PE (3/2023), Follicular Carcinoma of Thyroid, Tenosynovitis, de Quervain, h/o Hernia Repair, h/o Appendicitis, h/o Digit Amputation, H/o Total Thyroidectomy (Dr. Pacheco at [facility]; 4/2013), Adenocarcinoma of Lung (dx 10/2024)    Family & Social History: She is , she has two children. She has worked as an .     Tobacco: former 2005  EtOH: never   Illicits: never     Her brother has CAD with stent  1st cousin with SLE  Both parents had \"heart problems\"    Allergies:   Allergies   Allergen Reactions    Aspirin Shortness of breath     Other reaction(s): Unknown  \"stop breathing\"      Ibuprofen Shortness of breath     Stop breathing    Iodine Shortness of breath     Other reaction(s): Unknown    Iodine And Iodide Containing Products Shortness of breath     ? rash    Morphine Shortness of breath      Reported wamrth of face, improved with benadryl and cold compresses after getting morphine as well as episode of (subjective ) dyspnea, and thought she passed out - did have wamrth and erythema of face with mild edema R>L cheek notably on exam.     Penicillins Shortness of breath     Other reaction(s): Unknown    Sulfa (Sulfonamide Antibiotics) Angioedema    Clindamycin     Hydrochlorothiazide      Other reaction(s): Abdominal Pain   Slow heart rate    Oxycodone      " Other reaction(s): Vomiting    Sulfamethoxazole-Trimethoprim      Other reaction(s): Vomiting, Weakness     Latex Rash       Medications:   Current Outpatient Medications   Medication Sig Dispense Refill    amLODIPine (NORVASC) 5 mg tablet Take 10 mg by mouth every morning.      atorvastatin (LIPITOR) 40 mg tablet Take 1 tablet (40 mg total) by mouth daily. 90 tablet 3    benzonatate (TESSALON) 100 mg capsule Take 100 mg by mouth 3 (three) times a day as needed for cough.      biotin 1 mg tablet Take 1,000 mcg by mouth daily.      buprenorphine (BUTRANS) 5 mcg/hour patch weekly Place 1 patch on the skin every (seven) 7 days.      cholecalciferol, vitamin D3, 1,000 unit (25 mcg) tablet Take 1,000 Units by mouth daily.      clopidogreL (PLAVIX) 75 mg tablet Take 1 tablet (75 mg total) by mouth daily. 90 tablet 3    collagenase (SantyL) ointment Apply 1 Application topically daily.      cyanocobalamin, vitamin B-12, 1,000 mcg capsule Take 1,000 mcg by mouth daily.      docusate sodium (COLACE) 100 mg capsule Take 100 mg by mouth daily as needed for constipation.      hydrocortisone 1 % cream Apply to affected area 2 times daily 15 g 0    levothyroxine (SYNTHROID) 100 mcg tablet Take 100 mcg by mouth daily. BRAND ONLY      loperamide (IMODIUM) 2 mg capsule Take 2 mg by mouth every 8 (eight) hours as needed for diarrhea.      meclizine (ANTIVERT) 25 mg tablet Take 25 mg by mouth daily as needed.      MEDIHONEY, HONEY, TOP Apply topically daily.      mupirocin (BACTROBAN) 2 % ointment Apply 1 application. topically daily. Behind ears      ondansetron (ZOFRAN) 4 mg tablet Take 1 tablet (4 mg total) by mouth every 8 (eight) hours as needed for nausea or vomiting for up to 7 days. 21 tablet 0    OPSUMIT 10 mg tablet tablet Take 1 tablet (10 mg total) by mouth daily. 30 tablet 0    pantoprazole (PROTONIX) 40 mg EC tablet Take 1 tablet (40 mg total) by mouth daily Indications: gastroesophageal reflux disease. 30 tablet 0     polyethylene glycol (MIRALAX) 17 gram packet Take 17 g by mouth 2 (two) times a day as needed (constipation) for up to 5 days. 10 each 0    sildenafiL, pulm.hypertension, (REVATIO) 20 mg tablet Take 1 tablet (20 mg total) by mouth 3 (three) times a day. 270 tablet 3    sodium chloride (OCEAN) 0.65 % nasal spray Administer 2 sprays into each nostril daily.      tiZANidine (ZANAFLEX) 2 mg tablet Take 2 mg by mouth every 6 (six) hours as needed for muscle spasms.      tocilizumab (ACTEMRA) 200 mg/10 mL (20 mg/mL) solution Infuse 8 mg/kg into a venous catheter every 28 (twentyeight) days.      cyclobenzaprine (FLEXERIL) 5 mg tablet Take 1 tablet (5 mg total) by mouth 3 (three) times a day as needed for muscle spasms (/ chest pain). 90 tablet 0    gabapentin (NEURONTIN) 100 mg capsule Take 2 capsules (200 mg total) by mouth 3 (three) times a day. 180 capsule 0    honey (MEDIHONEY) 100 % paste Apply topically daily. 15 mL 0    lidocaine (ASPERCREME) 4 % adhesive patch,medicated topical patch Apply 1 patch topically daily as needed (pain). Remove & discard patch within 12 hours or as directed by prescriber. 30 patch 0     No current facility-administered medications for this visit.       Review of Systems:   All other systems reviewed and are negative except as per HPI.    Physical Exam:     Wt Readings from Last 10 Encounters:   11/19/24 49.9 kg (110 lb)   10/03/24 53.5 kg (118 lb)   09/03/24 52.8 kg (116 lb 8 oz)   08/05/24 49.4 kg (109 lb)   08/05/24 49.4 kg (109 lb)   07/20/24 49.5 kg (109 lb 2 oz)   07/19/24 51.3 kg (113 lb)   06/20/24 51.7 kg (114 lb)   06/14/24 52.6 kg (116 lb)   05/23/24 52.4 kg (115 lb 9.6 oz)       BP Readings from Last 5 Encounters:   11/19/24 120/72   10/03/24 128/76   09/03/24 120/79   08/05/24 (!) 148/75   07/25/24 (!) 155/77       Vitals:    11/19/24 1506   BP: 120/72   Pulse: (!) 119         Body mass index is 17.75 kg/m².  Body surface area is 1.52 meters squared.    Constitutional: NAD,  AAOx3  HENT: Normocephalic, atraumatic head, sclerae anicteric, no cervical lymphadenopathy, trachea midline  Cardiovascular: RRR, no murmurs, rubs or gallops, JVP: 8-9 cm H2O   cm H2O, no carotid bruits.  Respiratory: CTA bilateral lung fields, no wheezes, rales or rhonchi  GI: soft, non-tender/non-distended  Musculoskeletal / Extremities: trace peripheral edema, +2 pulses bilateral radial, DP/PT arteries, skin tightening on face and fingertips  Skin: no rashes  Neuro / Psych: no focal deficits, CNII-XII grossly intact, appropriate, cooperative    Diagnostic Data:             Thoracentesis Pathology (10/8/2024): Adenocarcinoma of lung/pulmonary origin present in a background of acute and chronic inflammation     Component      Latest Ref Rn 7/25/2024 8/26/2024 9/2/2024   BNP      <=100 pg/mL 220 (H)  315 (H)  80        Component      Latest Ref Rn 4/18/2024 5/24/2024   WBC      3.80 - 10.50 K/uL 8.40  16.22 (H)    RBC      3.93 - 5.22 M/uL 3.54 (L)  3.82 (L)    Hemoglobin      11.8 - 15.7 g/dL 9.8 (L)  10.3 (L)    Hematocrit      35.0 - 45.0 % 32.8 (L)  33.9 (L)    Platelets      150 - 369 K/uL 254  289       Component      Latest Ref Rn 5/24/2024   Magnesium      1.8 - 2.5 mg/dL 2.1      Component      Latest Ref Rn 4/13/2023 3/7/2024 5/22/2024   TSH      0.34 - 5.60 mIU/L 4.71  0.28 (L)  1.03      Component      Latest Ref Rn 5/24/2024   Sodium      136 - 145 mEQ/L 138    Potassium, Bld      3.5 - 5.1 mEQ/L 4.4    Chloride      98 - 107 mEQ/L 104    CO2      21 - 31 mEQ/L 24    BUN      7 - 25 mg/dL 40 (H)    Creatinine      0.6 - 1.2 mg/dL 1.1    Glucose      70 - 99 mg/dL 87    Calcium      8.6 - 10.3 mg/dL 9.5    eGFR      >=60.0 mL/min/1.73m*2 56.6 (L)    Anion Gap      3 - 15 mEQ/L 10      Component      Latest Ref Rng 4/13/2023   Hemoglobin A1C      <5.7 % 4.4        Component      Latest Ref Rng 4/14/2023   Triglycerides      30 - 149 mg/dL 44    Cholesterol      <=200 mg/dL 194    HDL      >=55 mg/dL  49 (L)    LDL Calculated      <=100 mg/dL 136 (H)    Non-HDL, Calculated      mg/dL 145    RISK      <=5.0  4.0       Component      Latest Ref Rng 4/13/2023   High Sens Troponin I      <15.0 pg/mL 23.0 (H)       Component      Latest Ref Rng 4/14/2023   Ferritin      11 - 250 ng/mL 75      Component      Latest Ref Rng 4/14/2023   Iron      35 - 150 ug/dL 29 (L)    TIBC      270 - 460 ug/dL 245 (L)    UIBC      180 - 360 ug/dL 216    Iron Saturation      15 - 45 % 12 (L)        Component      Latest Ref Rng 6/27/2022 4/13/2023   BNP      <=100 pg/mL 111 (H)  105 (H)                          ---    ECG (11/19/2024, personally reviewed):   Sinus tachycardia   Septal infarct   HR: 119 bpm     ---    TTE (10/8/2024, outside study):    A complete transthoracic echocardiogram (including 2D, color flow  Doppler, spectral Doppler and M-mode imaging) was performed using the  standard protocol.    The left ventricle is normal in size. There is moderately increased  wall thickness consistent with moderate concentric hypertrophy. Normal  left ventricular ejection fraction. The left ventricular ejection fraction  by visual estimate is 60%.    Grade II (moderate) LV diastolic dysfunction.    The right ventricle is normal in size. There is normal function of the  right ventricle.    The left atrium is mildly dilated.    The right atrium is mildly dilated.    There is trivial mitral valve leaflet thickening. There is trivial  mitral valve leaflet calcification. There is trace mitral regurgitation.  There is no mitral stenosis.    Aortic valve not well visualized. The aortic valve is trileaflet. There  is mild aortic valve thickening. There is mild aortic valve calcification.  There is no aortic stenosis. There is trace aortic regurgitation.    The tricuspid valve is normal in structure. There is mild tricuspid  regurgitation. Pulmonary artery systolic pressure measures 47 mmHg. The  pulmonary artery systolic pressure is moderately  elevated.    The inferior vena cava is normal in size (diameter <21 mm) and  decreases >50% in size with inspiration, suggesting a normal right atrial  pressure of 3 mmHg (range 0-5 mm Hg).    Compared to the prior study of 4/21/2022, there are no significant  changes.      PET/CT Skull-to-Thigh (6/4/2024):   An approximately 1.6 cm pulmonary nodule at the medial left lung base is  significantly FDG avid at max SUV 8.5.  This is nonspecific and can be seen in  the setting of benign pathology however malignancy is a concern.     There is more mild to moderate nonspecific uptake localizing to lita-fissural  nodules particularly on the left.     There is  moderate nonspecific left hilar and mediastinal dawson uptake.     No additional suspicious FDG avid findings appreciated.     CTA Chest (5/21/2024):   IMPRESSION:  1.  No evidence of acute pulmonary embolism.  2.  Multiple new perifissural left lower lobe nodules, suspicious for a  neoplastic process. PET/CT and or tissue sampling is recommended.  3.  Chronic interstitial lung disease in an NSIP pattern, with overall slightly  progressed appearance since March 2023. Pulmonary consultation is recommended.  4.  Stable cardiomegaly and large pericardial effusion.  5.  Mediastinal and supraclavicular adenopathy, similar to prior study, also  indeterminate, which could be further evaluated at time of PET/CT.     CTA Chest (4/16/2024):   IMPRESSION:  1. No evidence of pulmonary embolism.  2. Stable cardiomegaly and large pericardial effusion. Cardiology consultation  suggested.  3. Emphysema with bilateral lower lobe groundglass opacity and bronchiectasis in  an NSIP pattern, which can be seen with scleroderma. Pulmonary consultation  suggested.  4. Stable 16 mm left lower lobe masslike density which may represent  atelectasis, lung cancer or lymphoma. When clinically appropriate PET/CT  suggested.  5. Stable prominence of the main pulmonary artery which can be seen  with  pulmonary hypertension     TTE (5/23/2024, personally reviewed):  Mild increased wall thickness with normal left ventricular cavity and preserved systolic function. EF 65%. No wall motion abnormalities.   Normal right ventricular size with preserved systolic function.   Tiny IVC with >50% respiratory variation consistent with low-normal right sided filling pressures.   Small pericardial effusion, predominantly adjacent to right atrium. No evidence of hemodynamic compromise with normal respiratory variation and no chamber collapse. Prominent epicardial fat.   Compared with previous study of 4/10/24, small pericardial effusion persists.      TTE (4/10/2024, personally reviewed):  1. Normal left ventricular cavity size with mild concentric left ventricular hypertrophy. Normal systolic function. EF: 60%. No regional wall motion abnormalities. Cannot determine diastolic function. Global longitudinal strain not reported due to technical limitations of study.   2. Aortic valve has three cusps. Trace aortic regurgitation. Aortic valve and root sclerosis.  3. Thickened mitral valve leaflets. Trace mitral valve regurgitation. Normal left atrial size.   4. Normal right ventricular structure and function. Trace tricuspid valve regurgitation with estimated RVSP: 51 mmHg (assuming right atrial pressure of 3 mmHg). Normal right atrial size. Pulmonic valve structurally normal. Trace pulmonic valve regurgitation. Pulsed wave Doppler of the RVOT systolic profile shows decreased acceleration times and geometry consistent with mildly elevated PVR.  5. IVC size is normal with > 50% inspiratory collapse consistent with normal right atrial pressure. Small-to-moderate sized, circumferential pericardial effusion. No echocardiographic evidence for cardiac tamponade.   6. Compared to previous study from 3/4/2024, no significant change. Pericardial effusion size is similar, estimated right atrial pressure lower.           CTA Abd /  Pelvis (3/13/2024, personally reviewed):   There is dilatation of the of proximal/mid small bowel with relatively abrupt  transition left hemiabdomen, likely obstruction. Questionable lesion at the  transition point. This could be better evaluated with CT or MRI enterography.     Aorta and major branches patent. Superior mesenteric artery patent without  significant stenosis.  Suspect moderate stenosis of the origins of the of renal arteries bilaterally.     Large pericardial effusion, no change.     Patchy opacities lower lungs, similar to prior.      Coronary CTA (3/11/2024):   1. Two-vessel coronary artery disease as above that is moderate in severity.  CT  FFR is normal..  2. There is mitral annular calcification and aortic sclerosis.  The right atrium  is enlarged.  There is left ventricular hypertrophy.  There is a moderate to  large circumferential pericardial effusion.  3. Normal left ventricular wall motion and systolic function.  4. Coronary calcium score 313, 96th percentile     TTE (3/4/2024, personally reviewed):   Normal-sized left ventricle. Mild left ventricular hypertrophy. Preserved systolic function. LVEF 60%. No regional wall motion abnormalities. Grade II diastolic dysfunction.  Normal global longitudinal strain (-20%).  The aortic valve is not well seen.  Cannot determine the number of cusps.  Sclerotic leaflets.  No aortic stenosis.  Trace aortic insufficiency.  Normal sized aortic root.  The visualized portion of the ascending aorta is normal in size.  The mitral valve leaflets are thickened. Mild mitral annular calcification. Trace mitral regurgitation.   Mildly dilated left atrium.  Normal-sized right ventricle. Normal right ventricular systolic function.  Thickened tricuspid valve. There is mild tricuspid regurgitation with estimated RVSP of 46 mmHg.   Pulmonic valve is not well visualized. Trace pulmonic regurgitation.   Mildly dilated right atrium.  IVC is enlarged with <50% respiratory  variation consistent with elevated right atrial filling pressure (at least 15 mmHg).   Moderate, circumferential, circumferential pericardial effusion.  The largest pocket is located inferolaterally.  Elevated right atrial pressure.  No other clear echocardiographic features of increased pericardial pressure.  Correlate clinically.   Compared to previous TTE from 6/21/2023, pericardial effusion now moderate in size.  Right atrial pressure now elevated.         CTA Chest (3/3/2024):   IMPRESSION:  1.  No evidence of acute pulmonary embolism to the level of the segmental  branches.  2.  Moderate to large pericardial effusion measuring higher than simple fluid  density.  3.  Lower lobe lung field predominant interstitial thickening with some relative  subpleural sparing, consistent with a chronic interstitial lung disease,  unchanged from the prior study.  4.  Continued bilateral axillary, mediastinal, and bilateral hilar  lymphadenopathy, not definitely changed from the prior study, possibly related  to the patient's sarcoid.  5.  Emphysema.      CTA Chest PE (10/4/2023):  IMPRESSION:  1. No evidence for filling defect or vessel cutoff to suggest pulmonary  embolism.  Enlargement of the pulmonary arteries compatible with pulmonary  arterial hypertension.  2. Changes throughout the lungs as described above which can be seen with  systemic sclerosis/scleroderma.  Underlying pneumonia the lung bases cannot be  entirely excluded in the proper clinical setting.  3.  Additional stable findings as described above.        VQ (9/12/2023):  IMPRESSION:  Intermediate to high probability of pulmonary embolic disease.     6MWT (7/25/2023, on Sildenafil 20 mg TID):        TTE (6/21/2023, personally reviewed):  1. Normal left ventricular cavity size and mild concentric left ventricular hypertrophy. Normal systolic function. EF: 60%. No regional wall motion abnormalities. Grade I diastolic dysfunction.   2. Aortic valve cusps not  well visualized. Trace aortic regurgitation. Aortic valve and root sclerosis.  3. Thickened mitral valve leaflets. Mild mitral annular calcification. Trace mitral regurgitation. Mildly dilated left atrial size.   4. Normal right ventricular structure and function. Mild tricuspid valve regurgitation with estimated RVSP: 55 mmHg (assuming right atrial pressure of 3 mmHg). Normal right atrial size. Pulmonic valve not well visualized. Trace pulmonic valve regurgitation. Pulsed wave Doppler of the RVOT systolic profile show decreased acceleration times ( msec) and late systolic notching consistent with elevated PVR.   5. IVC size is normal with > 50% inspiratory collapse consistent with normal right atrial pressure. Small pericardial effusion without echocardiographic evidence of tamponade.  6. Compared to previous study from 4/14/2023, estimated right ventricular systolic pressure is higher.        TTE (4/14/2023, personally reviewed):   The left ventricular cavity size is normal with mild left ventricular hypertrophy and preserved systolic function. Estimated EF 65%. No regional wall motion abnormalities. Grade I diastolic dysfunction.     The aortic valve is tricuspid. Aortic valve sclerosis. Trace aortic regurgitation.      The visualized portions of the aortic root and ascending aorta are normal in size.     The mitral valve is mildly thickened. Mild mitral annular calcification. Trace mitral regurgitation.       The left atrium is severely dilated.      The right ventricle is normal in size with preserved systolic function     The pulmonic valve is not well seen.     The tricuspid valve is normal in appearance. mild tricuspid regurgitation with an estimated RVSP of 34 mmHg.       Right atrium is normal in size.     The IVC is small and collapses > 50% with inspiration consistent with normal right atrial pressures.     Small pericardial effusion, mostly posterior.       Compared to previous echocardiogram from  8/22/2022, there is no significant change        TTE (11/28/2022, personally reviewed):   Normal right- and mildly elevated left-sided filling pressures (RA: 3 mmHg, PCWP: 13 mmHg)  Elevated pulmonary artery pressures (60/22(35 mmHg)) in the setting of PCWP: 13 mmHg.   Normal cardiac output and index (CO: 5.1 L/min, CI: 3.2 L/min/m2)  PVR: 4.3 Wood units. SVR: 1840 dynes/sec/cm5      TTE (8/22/2022, personally reviewed):  Normal-sized LV. Normal LV systolic function. Estimated EF 55- 60%. Wall motion appears grossly normal. Mild concentric left ventricular hypertrophy. Grade I LV diastolic dysfunction.  Pulmonic valve not well visualized. Grossly normal pulmonic valve structure. Trace pulmonic valve regurgitation. No pulmonic valve stenosis.  Aortic valve not well visualized. Aortic valve not well seen but likely trileaflet. Focal aortic valve calcification present. Sclerotic aortic valve leaflets. Mild aortic valve regurgitation. No aortic valve stenosis.  RV not well visualized. Normal-sized RV. Normal RV systolic function.  Normal-sized RA.  There is a small loculated pericardial effusion anterior to the right atrium. No cardiac tamponade.  Sclerotic mitral valve. Mitral valve calcification of the anterior leaflet present.  Trace mitral valve regurgitation.  No significant mitral valve stenosis.  Normal-sized IVC. IVC demonstrates normal respiratory collapse.  Tricuspid valve structure is grossly normal. Mild to moderate tricuspid valve regurgitation.  Estimated RVSP = 50-55 mmHg.  Mildly dilated LA.  No previous echocardiogram available for comparison.     TTE (4/21/2022, outside study):  This result has an attachment that is not available.     A complete transthoracic echocardiogram (including 2D, color flow   Doppler, spectral Doppler and M-mode imaging) was performed using the   standard protocol. The study quality was adequate.     The left ventricle is normal in size. There is moderately increased   wall  thickness consistent with moderate concentric hypertrophy.   Endocardium is incompletely visualized, but no regional wall motion   abnormalities are noted in the visualized segments. Normal left   ventricular ejection fraction. The left ventricular ejection fraction by   visual estimate is 55% to 60%.     The right ventricle is normal in size. There is normal function of the   right ventricle.     The left atrium is normal in size. Left atrial volume is 58 mL.     The right atrial volume measures 52 mL. The right atrial volume index   measures 34 mL/m2.     There is trace mitral regurgitation. There is no mitral stenosis.     The aortic valve is trileaflet. There is mild aortic valve thickening.   There is no aortic stenosis. There is trace aortic regurgitation.     There is mild to moderate tricuspid regurgitation. Pulmonary artery   systolic pressure measures 52 mmHg. The pulmonary artery systolic pressure   is moderately elevated.     The aorta measures 3.2 cm at the sinus of Valsalva. The proximal   segment of the ascending aorta is mildly enlarged. The proximal segment of   the ascending aorta measures 3.4 cm. The proximal segment of the ascending   aorta measures 2.2 cm/m2, indexed for body surface area.     Trivial pericardial effusion present. There is no echocardiographic   evidence of cardiac tamponade.     The inferior vena cava is normal in size (diameter <21 mm) and   decreases >50% in size with inspiration, suggesting a normal right atrial   pressure of 3 mmHg (range 0-5 mm Hg).     Compared to the prior study of 3/13/2020, there are no significant   changes.        PFT (3/23/2022):      PFT (7/14/2021):      CT Chest (5/2/2021, outside study):  CLINICAL INFORMATION: Systemic sclerosis. Interstitial lung disease. Groundglass nodule     PROCEDURE: Unenhanced CT of the chest was performed using thin section reconstructions. Images were obtained on inspiration and expiration.     COMPARISON: June and  August 2020     FINDINGS:     LUNGS, PLEURA:     Groundglass opacities with reticulation with subpleural sparing in the lower lobes, without honeycombing or architectural distortion, unchanged.     Right upper lobe groundglass nodule, image 114 of series 3, 8 x 11 mm, previously 6 mm.     Expiratory images are of diagnostic quality and do not demonstrate evidence of clinically significant air trapping.     CARDIOVASCULAR, MEDIASTINUM, THYROID: Coronary calcifications. Trace pericardial effusion.     LYMPH NODES: Subcentimeter mediastinal lymph nodes, unchanged. Unchanged axillary lymph nodes.     SKELETON, CHEST WALL: No destructive bone lesion     UPPER ABDOMEN: Patulous esophagus. 3 mm right renal stone.       RHC (9/02/2020):  RA   8 mmHg   RV   40/5 mmHg   PA (mean)  44/16 (25) mmHg   PCWP   12 mmHg   CO   4.65 lpm (Thermodilution)   CI   2.65 lpm/m-squared   SVI    33 ml/beat/m-squared (lower limit normal 29)   PVR   2.8 mmHg/l/min (Wood units)   SVR   2064 dyne-sec-cm-5         PFT (3/18/2020):      TTE (3/13/2020, outside study):  · The study quality was good.   · The left ventricle is normal in size. Top normal left ventricular wall   thickness. There are no segmental wall motion abnormalities. The left   ventricular ejection fraction is 55-60% by visual estimate and 3D   echocardiography. Normal left ventricular ejection fraction.   · There is grade I (mild) LV diastolic dysfunction.   · The right ventricle is normal in size. There is normal function of the   right ventricle.   · The right atrium is mildly dilated.   · There is mild mitral valve anterior leaflet thickening. There is mild   mitral valve leaflet calcification. There is flattening of the mitral   leaflets without raphael prolapse. There is trace mitral regurgitation.   · The aortic valve is trileaflet. There is mild thickening of the aortic   valve. There is mild calcification of the aortic valve. There is no aortic   /stenosis. There is trace  aortic regurgitation.   · There is mild tricuspid valve leaflet thickening. There is mild to   moderate tricuspid regurgitation. The pulmonary artery systolic pressure   is moderately elevated. Pulmonary artery systolic pressure measures 50   mmHg. There is mid-systolic notching of the RVOT VTI consistent with   elevated pulmonary vascular resistance.   · The inferior vena cava is normal in size (diameter <21 mm) and decreases   >50% in size with inspiration, suggesting a normal right atrial pressure   of 3 mmHg (range 0-5 mm Hg).   · Trivial loculated pericardial effusion present adjacent to the right   ventricle and adjacent to the right atrium.          Assessment / Plan:     1. Shortness of Breath: She is intravascularly near euvolemic on exam today. Lung sounds are absent in left lung base to mid-left lung. I suspect that she has reaccumulated left-sided pleural effusion. I have offered her the options today of either transfer to the Emergency Department or set-up with Interventional Radiology as soon as tomorrow. She reports that she would like to present to her [facility] outpatient appointment on 11/21 and has declined both of these options at this time. She is hemodynamically near her baseline (I.e.: tachycardic, normotensive with exam findings for normal cardiac filling pressures. O2 saturations appropriate on current oxygen supplementation). I have offered to help coordinate Interventional Radiology appointment after her [facility] appointment, if she desires. Her daughter will plan to call us with updates after that visit.     2. Chest Pain: She has on-going, reproducible non-cardiac/atypical chest pains. Suspect that this may be related to inflammation +/- adenocarcinoma. She reports that Tizanidine (prescribed at [facility]) helped with pain, and she does not like taking opiates/opioids. I believe that there is an opportunity for Palliative assistance here. I suspect that she will be getting some Palliative  support through [facility] Sig[facility]. I have also asked our home-based Palliative NP team to engage with Ms. Felix and her daughter for support opportunities.     3. Pulmonary Hypertension (WHO Group I, NYHA/WHO FC III): Occurring in the background of Systemic Sclerosis with ILD. Proven with 11/2022 RHC showing mean PA 35 mmHg (with PCWP 13 mmHg) and PVR 4.3 Wood units. Recent TTEs (including 10/2024) have shown appropriate RV:PA coupling with normal RV size and systolic function. Estimated RVSP on TTE remains elevated.     Intravascularly, she is near euvolemic on exam today. She will continue Sildenafil 20 mg TID and Macitentan 10 mg daily for combination therapy.     4. HTN: Controlled. She is continued on Amlodipine.     5. Systemic Sclerosis / Raynaud's Disease: Per Rheumatology (Dr. Trevizo). She has received some doses of Tocilizumab. She has recently had site reaction with SQ delivery.      6. ILD: Per Pulmonology and Rheumatology (Dr. Trevizo). She has not tolerated trials of Mycophenolate.     7. PE: She has had elevated probability on V/Q scans, but CTA Chest has consistently not shown evidence for acute or chronic thromboembolism. Apixaban has been discontinued.     8. CAD: Two-vessel non-obstructive disease on March 2024 CCTA. She is continued on Atorvastatin 40 mg daily and Clopidogrel 75 mg daily.       Thank you for allowing me to participate in the care of your patient. I would like to see her in the office in 3 months.     Please do not hesitate to contact me with any questions or issues.     Sincerely,     Timothy Arvizu, DO  Cardiology / Heart Failure / Pulmonary Hypertension    [email or web address]    [phone]    [facility]  [facility]  [address]   [place] [id number]      Counseling and coordination of care consisted of more than 50% of this complex medical evaluation; numerous diagnostic and management options were considered and testing results were reviewed and  "discussed with the patient as best as able, during this encounter; the patient has multiple medical problems and is on numerous medications; the patient has a high risk of complications due to their cardiac disease, and all these resulted in the assigned \"complex medical decision-making\" characterization and coding of this patient encounter.  I spent  43 minutes on this date of service performing the following activities: obtaining history, performing examination, entering orders, documenting, preparing for visit, obtaining / reviewing records, providing counseling and education and independently reviewing study/studies.  "

## 2024-11-20 VITALS
HEIGHT: 66 IN | WEIGHT: 110 LBS | DIASTOLIC BLOOD PRESSURE: 72 MMHG | BODY MASS INDEX: 17.68 KG/M2 | HEART RATE: 119 BPM | SYSTOLIC BLOOD PRESSURE: 120 MMHG

## 2024-11-20 DIAGNOSIS — R91.8 LUNG MASS: Primary | ICD-10-CM

## 2024-11-20 NOTE — TELEPHONE ENCOUNTER
Sildenafil renewal question req sent 0810 11/20/2024    Information regarding your request  CaseId:[id number];Status:Cancelled;Explanation:Other. case [id number] good till 12/31/2025;

## 2024-11-20 NOTE — TELEPHONE ENCOUNTER
The home based palliative team will manage cancer associated pain but not necessarily chronic pain.  I will ask them to review chart.

## 2024-11-23 ENCOUNTER — HOSPITAL ENCOUNTER (INPATIENT)
Facility: HOSPITAL | Age: 64
LOS: 7 days | DRG: 180 | End: 2024-11-30
Attending: EMERGENCY MEDICINE | Admitting: STUDENT IN AN ORGANIZED HEALTH CARE EDUCATION/TRAINING PROGRAM
Payer: COMMERCIAL

## 2024-11-23 ENCOUNTER — APPOINTMENT (EMERGENCY)
Dept: RADIOLOGY | Facility: HOSPITAL | Age: 64
DRG: 180 | End: 2024-11-23
Payer: COMMERCIAL

## 2024-11-23 ENCOUNTER — APPOINTMENT (INPATIENT)
Dept: RADIOLOGY | Facility: HOSPITAL | Age: 64
DRG: 180 | End: 2024-11-23
Attending: INTERNAL MEDICINE
Payer: COMMERCIAL

## 2024-11-23 DIAGNOSIS — J90 PLEURAL EFFUSION ON LEFT: ICD-10-CM

## 2024-11-23 DIAGNOSIS — J96.01 ACUTE RESPIRATORY FAILURE WITH HYPOXIA (CMS/HCC): Primary | ICD-10-CM

## 2024-11-23 LAB
ALBUMIN SERPL-MCNC: 4.5 G/DL (ref 3.5–5.7)
ALP SERPL-CCNC: 62 IU/L (ref 34–125)
ALT SERPL-CCNC: 4 IU/L (ref 7–52)
ANION GAP SERPL CALC-SCNC: 10 MEQ/L (ref 3–15)
ANISOCYTOSIS BLD QL SMEAR: ABNORMAL
AST SERPL-CCNC: 30 IU/L (ref 13–39)
ATRIAL RATE: 84
BASE EXCESS BLDV CALC-SCNC: 0.6 MEQ/L
BASOPHILS # BLD: 0 K/UL (ref 0.01–0.1)
BASOPHILS NFR BLD: 0 %
BILIRUB SERPL-MCNC: 0.5 MG/DL (ref 0.3–1.2)
BNP SERPL-MCNC: 213 PG/ML
BUN SERPL-MCNC: 14 MG/DL (ref 7–25)
CA-I BLD-SCNC: 1.27 MMOL/L (ref 1.15–1.27)
CALCIUM SERPL-MCNC: 10 MG/DL (ref 8.6–10.3)
CHLORIDE BLDV-SCNC: 110 MEQ/L (ref 98–109)
CHLORIDE SERPL-SCNC: 107 MEQ/L (ref 98–107)
CO2 BLDV-SCNC: 27.4 MEQ/L (ref 22–32)
CO2 SERPL-SCNC: 23 MEQ/L (ref 21–31)
COHGB MFR BLD: 1.6 %
CREAT SERPL-MCNC: 1 MG/DL (ref 0.6–1.2)
DIFFERENTIAL METHOD BLD: ABNORMAL
EGFRCR SERPLBLD CKD-EPI 2021: >60 ML/MIN/1.73M*2
EOSINOPHIL # BLD: 0.07 K/UL (ref 0.04–0.36)
EOSINOPHIL NFR BLD: 1 %
ERYTHROCYTE [DISTWIDTH] IN BLOOD BY AUTOMATED COUNT: 17 % (ref 11.7–14.4)
FIO2 ON VENT: ABNORMAL %
FLUAV RNA SPEC QL NAA+PROBE: NEGATIVE
FLUBV RNA SPEC QL NAA+PROBE: NEGATIVE
GLUCOSE BLDV-MCNC: 89 MG/DL (ref 70–99)
GLUCOSE SERPL-MCNC: 83 MG/DL (ref 70–99)
HCO3 BLDV-SCNC: 24.9 MEQ/L (ref 21–28)
HCT VFR BLD AUTO: 47.1 % (ref 35–45)
HGB BLD-MCNC: 14.8 G/DL (ref 11.8–15.7)
HGB BLDV-MCNC: 12.2 G/DL (ref 12–16)
INHALED O2 CONCENTRATION: ABNORMAL %
LACTATE SERPL-SCNC: 0.9 MMOL/L (ref 0.4–2)
LYMPHOCYTES # BLD: 1.26 K/UL (ref 1.2–3.5)
LYMPHOCYTES NFR BLD: 19 %
MAGNESIUM SERPL-MCNC: 1.9 MG/DL (ref 1.8–2.5)
MCH RBC QN AUTO: 29.8 PG (ref 28–33.2)
MCHC RBC AUTO-ENTMCNC: 31.4 G/DL (ref 32.2–35.5)
MCV RBC AUTO: 95 FL (ref 83–98)
METHGB BLD-SCNC: 0 % (ref 0.4–1.5)
MONOCYTES # BLD: 0.33 K/UL (ref 0.28–0.8)
MONOCYTES NFR BLD: 5 %
MRSA DNA SPEC QL NAA+PROBE: NEGATIVE
NEUTS BAND # BLD: 0.07 K/UL (ref 0–0.53)
NEUTS BAND # BLD: 4.89 K/UL (ref 1.7–7)
NEUTS BAND NFR BLD: 1 %
NEUTS SEG NFR BLD: 74 %
P AXIS: 51
PCO2 BLDV: 44 MM HG (ref 41–51)
PH BLDV: 7.38 [PH] (ref 7.32–7.42)
PLAT MORPH BLD: NORMAL
PLATELET # BLD AUTO: 238 K/UL (ref 150–369)
PLATELET # BLD EST: ABNORMAL 10*3/UL
PMV BLD AUTO: 11.6 FL (ref 9.4–12.3)
PO2 BLDV: 49 MM HG (ref 25–40)
POIKILOCYTOSIS BLD QL SMEAR: ABNORMAL
POTASSIUM BLDV-SCNC: 3.9 MEQ/L (ref 3.4–4.5)
POTASSIUM SERPL-SCNC: 5.3 MEQ/L (ref 3.5–5.1)
PR INTERVAL: 132
PROT SERPL-MCNC: 9 G/DL (ref 6–8.2)
QRS DURATION: 80
QT INTERVAL: 402
QTC CALCULATION(BAZETT): 475
R AXIS: 14
RBC # BLD AUTO: 4.96 M/UL (ref 3.93–5.22)
RSV RNA SPEC QL NAA+PROBE: NEGATIVE
SAO2 % BLDV: 76 % (ref 30–60)
SARS-COV-2 RNA RESP QL NAA+PROBE: NEGATIVE
SODIUM BLDV-SCNC: 141 MEQ/L (ref 136–145)
SODIUM SERPL-SCNC: 140 MEQ/L (ref 136–145)
T WAVE AXIS: 85
TROPONIN I SERPL HS-MCNC: 11.9 PG/ML
TROPONIN I SERPL HS-MCNC: 13.4 PG/ML
VENTRICULAR RATE: 84
WBC # BLD AUTO: 6.61 K/UL (ref 3.8–10.5)

## 2024-11-23 PROCEDURE — 93010 ELECTROCARDIOGRAM REPORT: CPT | Performed by: INTERNAL MEDICINE

## 2024-11-23 PROCEDURE — 83735 ASSAY OF MAGNESIUM: CPT | Performed by: EMERGENCY MEDICINE

## 2024-11-23 PROCEDURE — 87040 BLOOD CULTURE FOR BACTERIA: CPT | Mod: 91 | Performed by: EMERGENCY MEDICINE

## 2024-11-23 PROCEDURE — 71045 X-RAY EXAM CHEST 1 VIEW: CPT

## 2024-11-23 PROCEDURE — 63600000 HC DRUGS/DETAIL CODE: Mod: JZ

## 2024-11-23 PROCEDURE — 63700000 HC SELF-ADMINISTRABLE DRUG: Performed by: INTERNAL MEDICINE

## 2024-11-23 PROCEDURE — 96374 THER/PROPH/DIAG INJ IV PUSH: CPT

## 2024-11-23 PROCEDURE — 25000000 HC PHARMACY GENERAL: Performed by: INTERNAL MEDICINE

## 2024-11-23 PROCEDURE — 99223 1ST HOSP IP/OBS HIGH 75: CPT | Performed by: INTERNAL MEDICINE

## 2024-11-23 PROCEDURE — 80053 COMPREHEN METABOLIC PANEL: CPT | Performed by: EMERGENCY MEDICINE

## 2024-11-23 PROCEDURE — 82803 BLOOD GASES ANY COMBINATION: CPT | Performed by: EMERGENCY MEDICINE

## 2024-11-23 PROCEDURE — 96375 TX/PRO/DX INJ NEW DRUG ADDON: CPT

## 2024-11-23 PROCEDURE — 21400000 HC ROOM AND CARE CCU/INTERMEDIATE

## 2024-11-23 PROCEDURE — 85025 COMPLETE CBC W/AUTO DIFF WBC: CPT | Performed by: EMERGENCY MEDICINE

## 2024-11-23 PROCEDURE — 96365 THER/PROPH/DIAG IV INF INIT: CPT

## 2024-11-23 PROCEDURE — 99285 EMERGENCY DEPT VISIT HI MDM: CPT | Mod: 25

## 2024-11-23 PROCEDURE — 36415 COLL VENOUS BLD VENIPUNCTURE: CPT | Performed by: EMERGENCY MEDICINE

## 2024-11-23 PROCEDURE — 63600000 HC DRUGS/DETAIL CODE: Mod: JZ | Performed by: INTERNAL MEDICINE

## 2024-11-23 PROCEDURE — 87637 SARSCOV2&INF A&B&RSV AMP PRB: CPT | Performed by: EMERGENCY MEDICINE

## 2024-11-23 PROCEDURE — 93005 ELECTROCARDIOGRAM TRACING: CPT | Performed by: EMERGENCY MEDICINE

## 2024-11-23 PROCEDURE — 84484 ASSAY OF TROPONIN QUANT: CPT | Performed by: EMERGENCY MEDICINE

## 2024-11-23 PROCEDURE — 25800000 HC PHARMACY IV SOLUTIONS: Performed by: EMERGENCY MEDICINE

## 2024-11-23 PROCEDURE — 87641 MR-STAPH DNA AMP PROBE: CPT | Performed by: INTERNAL MEDICINE

## 2024-11-23 PROCEDURE — 83880 ASSAY OF NATRIURETIC PEPTIDE: CPT | Performed by: EMERGENCY MEDICINE

## 2024-11-23 PROCEDURE — 71250 CT THORAX DX C-: CPT

## 2024-11-23 PROCEDURE — 63700000 HC SELF-ADMINISTRABLE DRUG: Performed by: HOSPITALIST

## 2024-11-23 PROCEDURE — 63600000 HC DRUGS/DETAIL CODE: Mod: JZ | Performed by: EMERGENCY MEDICINE

## 2024-11-23 PROCEDURE — 94660 CPAP INITIATION&MGMT: CPT

## 2024-11-23 RX ORDER — BUPRENORPHINE 5 UG/H
1 PATCH TRANSDERMAL
Status: DISCONTINUED | OUTPATIENT
Start: 2024-11-23 | End: 2024-11-26

## 2024-11-23 RX ORDER — CLOPIDOGREL BISULFATE 75 MG/1
75 TABLET ORAL DAILY
Status: DISCONTINUED | OUTPATIENT
Start: 2024-11-23 | End: 2024-11-27

## 2024-11-23 RX ORDER — DEXTROSE 40 %
15-30 GEL (GRAM) ORAL AS NEEDED
Status: DISCONTINUED | OUTPATIENT
Start: 2024-11-23 | End: 2024-11-30 | Stop reason: HOSPADM

## 2024-11-23 RX ORDER — ATORVASTATIN CALCIUM 40 MG/1
40 TABLET, FILM COATED ORAL
Status: DISCONTINUED | OUTPATIENT
Start: 2024-11-23 | End: 2024-11-26

## 2024-11-23 RX ORDER — ENOXAPARIN SODIUM 100 MG/ML
40 INJECTION SUBCUTANEOUS
Status: DISCONTINUED | OUTPATIENT
Start: 2024-11-23 | End: 2024-11-26

## 2024-11-23 RX ORDER — IBUPROFEN 200 MG
16-32 TABLET ORAL AS NEEDED
Status: DISCONTINUED | OUTPATIENT
Start: 2024-11-23 | End: 2024-11-30 | Stop reason: HOSPADM

## 2024-11-23 RX ORDER — LABETALOL HYDROCHLORIDE 5 MG/ML
10 INJECTION, SOLUTION INTRAVENOUS EVERY 6 HOURS PRN
Status: DISCONTINUED | OUTPATIENT
Start: 2024-11-23 | End: 2024-11-30 | Stop reason: HOSPADM

## 2024-11-23 RX ORDER — IPRATROPIUM BROMIDE AND ALBUTEROL SULFATE 2.5; .5 MG/3ML; MG/3ML
3 SOLUTION RESPIRATORY (INHALATION) EVERY 6 HOURS
Status: DISCONTINUED | OUTPATIENT
Start: 2024-11-23 | End: 2024-11-30 | Stop reason: HOSPADM

## 2024-11-23 RX ORDER — SILDENAFIL CITRATE 20 MG/1
20 TABLET ORAL 3 TIMES DAILY
Status: DISCONTINUED | OUTPATIENT
Start: 2024-11-23 | End: 2024-11-30 | Stop reason: HOSPADM

## 2024-11-23 RX ORDER — ETHACRYNIC ACID 25 MG/1
25 TABLET ORAL 2 TIMES DAILY
Status: DISCONTINUED | OUTPATIENT
Start: 2024-11-23 | End: 2024-11-30 | Stop reason: HOSPADM

## 2024-11-23 RX ORDER — PANTOPRAZOLE SODIUM 40 MG/1
40 TABLET, DELAYED RELEASE ORAL DAILY
Status: DISCONTINUED | OUTPATIENT
Start: 2024-11-23 | End: 2024-11-30 | Stop reason: HOSPADM

## 2024-11-23 RX ORDER — BUMETANIDE 0.25 MG/ML
2 INJECTION, SOLUTION INTRAMUSCULAR; INTRAVENOUS ONCE
Status: DISCONTINUED | OUTPATIENT
Start: 2024-11-23 | End: 2024-11-23

## 2024-11-23 RX ORDER — ONDANSETRON HYDROCHLORIDE 2 MG/ML
4 INJECTION, SOLUTION INTRAVENOUS ONCE
Status: COMPLETED | OUTPATIENT
Start: 2024-11-23 | End: 2024-11-23

## 2024-11-23 RX ORDER — DEXTROSE 50 % IN WATER (D50W) INTRAVENOUS SYRINGE
25 AS NEEDED
Status: DISCONTINUED | OUTPATIENT
Start: 2024-11-23 | End: 2024-11-30 | Stop reason: HOSPADM

## 2024-11-23 RX ORDER — LEVOTHYROXINE SODIUM 100 UG/1
100 TABLET ORAL DAILY
Status: DISCONTINUED | OUTPATIENT
Start: 2024-11-23 | End: 2024-11-30 | Stop reason: HOSPADM

## 2024-11-23 RX ORDER — ACETYLCYSTEINE 200 MG/ML
200 SOLUTION ORAL; RESPIRATORY (INHALATION)
Status: DISCONTINUED | OUTPATIENT
Start: 2024-11-23 | End: 2024-11-30 | Stop reason: HOSPADM

## 2024-11-23 RX ORDER — FUROSEMIDE 10 MG/ML
40 INJECTION INTRAMUSCULAR; INTRAVENOUS ONCE
Status: DISCONTINUED | OUTPATIENT
Start: 2024-11-23 | End: 2024-11-23

## 2024-11-23 RX ORDER — HYDROMORPHONE HYDROCHLORIDE 2 MG/1
2 TABLET ORAL ONCE
Status: COMPLETED | OUTPATIENT
Start: 2024-11-23 | End: 2024-11-23

## 2024-11-23 RX ORDER — ACETAMINOPHEN 325 MG/1
650 TABLET ORAL EVERY 6 HOURS PRN
Status: DISCONTINUED | OUTPATIENT
Start: 2024-11-23 | End: 2024-11-26

## 2024-11-23 RX ORDER — LORAZEPAM 2 MG/ML
0.5 INJECTION INTRAMUSCULAR ONCE
Status: COMPLETED | OUTPATIENT
Start: 2024-11-23 | End: 2024-11-24

## 2024-11-23 RX ORDER — ONDANSETRON HYDROCHLORIDE 2 MG/ML
INJECTION, SOLUTION INTRAVENOUS
Status: COMPLETED
Start: 2024-11-23 | End: 2024-11-23

## 2024-11-23 RX ORDER — AMLODIPINE BESYLATE 10 MG/1
10 TABLET ORAL EVERY MORNING
Status: DISCONTINUED | OUTPATIENT
Start: 2024-11-23 | End: 2024-11-27

## 2024-11-23 RX ORDER — TIZANIDINE 2 MG/1
2 TABLET ORAL EVERY 6 HOURS PRN
Status: DISCONTINUED | OUTPATIENT
Start: 2024-11-23 | End: 2024-11-30 | Stop reason: HOSPADM

## 2024-11-23 RX ORDER — CHOLECALCIFEROL (VITAMIN D3) 25 MCG
25 TABLET ORAL DAILY
Status: DISCONTINUED | OUTPATIENT
Start: 2024-11-23 | End: 2024-11-26

## 2024-11-23 RX ADMIN — ONDANSETRON HYDROCHLORIDE 4 MG: 2 INJECTION, SOLUTION INTRAVENOUS at 09:51

## 2024-11-23 RX ADMIN — AMLODIPINE BESYLATE 10 MG: 10 TABLET ORAL at 13:44

## 2024-11-23 RX ADMIN — ACETYLCYSTEINE 200 MG: 200 INHALANT RESPIRATORY (INHALATION) at 20:49

## 2024-11-23 RX ADMIN — ETHACRYNIC ACID 25 MG: 25 TABLET ORAL at 20:49

## 2024-11-23 RX ADMIN — ENOXAPARIN SODIUM 40 MG: 40 INJECTION SUBCUTANEOUS at 17:37

## 2024-11-23 RX ADMIN — TIZANIDINE 2 MG: 4 TABLET ORAL at 17:37

## 2024-11-23 RX ADMIN — ONDANSETRON 4 MG: 2 INJECTION INTRAMUSCULAR; INTRAVENOUS at 09:51

## 2024-11-23 RX ADMIN — IPRATROPIUM BROMIDE AND ALBUTEROL SULFATE 3 ML: .5; 3 SOLUTION RESPIRATORY (INHALATION) at 15:07

## 2024-11-23 RX ADMIN — SILDENAFIL 20 MG: 20 TABLET, FILM COATED ORAL at 20:49

## 2024-11-23 RX ADMIN — HYDROMORPHONE HYDROCHLORIDE 2 MG: 2 TABLET ORAL at 03:41

## 2024-11-23 RX ADMIN — PANTOPRAZOLE SODIUM 40 MG: 40 TABLET, DELAYED RELEASE ORAL at 13:44

## 2024-11-23 RX ADMIN — ACETYLCYSTEINE 200 MG: 200 INHALANT RESPIRATORY (INHALATION) at 15:07

## 2024-11-23 RX ADMIN — VANCOMYCIN HYDROCHLORIDE 1000 MG: 1 INJECTION, POWDER, LYOPHILIZED, FOR SOLUTION INTRAVENOUS at 09:40

## 2024-11-23 RX ADMIN — IPRATROPIUM BROMIDE AND ALBUTEROL SULFATE 3 ML: .5; 3 SOLUTION RESPIRATORY (INHALATION) at 21:40

## 2024-11-23 RX ADMIN — TIZANIDINE 2 MG: 4 TABLET ORAL at 22:22

## 2024-11-23 RX ADMIN — LEVOTHYROXINE SODIUM 100 MCG: 0.1 TABLET ORAL at 13:45

## 2024-11-23 RX ADMIN — CEFEPIME 2 G: 2 INJECTION, POWDER, FOR SOLUTION INTRAVENOUS at 11:58

## 2024-11-23 RX ADMIN — ACETAMINOPHEN 650 MG: 325 TABLET ORAL at 18:03

## 2024-11-23 RX ADMIN — SILDENAFIL 20 MG: 20 TABLET, FILM COATED ORAL at 13:44

## 2024-11-23 RX ADMIN — Medication 25 MCG: at 13:44

## 2024-11-23 ASSESSMENT — COGNITIVE AND FUNCTIONAL STATUS - GENERAL
MOVING TO AND FROM BED TO CHAIR: 3 - A LITTLE
STANDING UP FROM CHAIR USING ARMS: 3 - A LITTLE
CLIMB 3 TO 5 STEPS WITH RAILING: 2 - A LOT
WALKING IN HOSPITAL ROOM: 2 - A LOT
MOVING TO AND FROM BED TO CHAIR: 3 - A LITTLE
WALKING IN HOSPITAL ROOM: 2 - A LOT
CLIMB 3 TO 5 STEPS WITH RAILING: 1 - TOTAL
STANDING UP FROM CHAIR USING ARMS: 3 - A LITTLE

## 2024-11-23 ASSESSMENT — ENCOUNTER SYMPTOMS: SHORTNESS OF BREATH: 1

## 2024-11-23 NOTE — CONSULTS
Pulmonary Consult Note       SUBJECTIVE   64-year-old female with past medical history of Group I pulmonary hypertension secondary to systemic sclerosis, ILD, stage IV lung adenocarcinoma who presented to the ED for worsening shortness of breath. Pulmonary is consulted for mucous plugging.     In the ED, she was placed on BiPAP. On my evaluation, she is off of BiPAP. She reports shortness of breath and chronic cough productive of clear sputum. Denies signs or symptoms of aspiration.     Regarding her oncologic history, she underwent bronchoscopic lung biopsy in August and was admitted post-procedurally.  In October 2024 she was admitted to [facility].  She underwent thoracentesis and was found to have malignant effusion on the left consistent with adenocarcinoma.  She did follow-up with Luk[facility] Sigala at [place] and was told that she is not a candidate for treatment.  For her ILD, she sees Dr. Wasserman.  She has been on tocilizumab.     OBJECTIVE        Vital Signs:   Visit Vitals  BP (!) 158/74 (BP Location: Left upper arm, Patient Position: Lying)   Pulse (!) 144   Temp (S) 37.4 °C (99.3 °F) (Rectal)   Resp 18   LMP  (LMP Unknown)   SpO2 94%       I/O's:    Intake/Output Summary (Last 24 hours) at 11/23/2024 1608  Last data filed at 11/23/2024 1228  Gross per 24 hour   Intake 100 ml   Output --   Net 100 ml       Medications:    Reviewed. Pertinent medications as below.     acetylcysteine  200 mg nebulization BID (8a, 8p)    amLODIPine  10 mg oral q AM    atorvastatin  40 mg oral Daily (6p)    buprenorphine  1 patch transdermal q7 days    cholecalciferol (vitamin D3)  25 mcg oral Daily    [Provider Managed Hold] clopidogreL  75 mg oral Daily    enoxaparin  40 mg subcutaneous Daily (6p)    ethacrynic acid  25 mg oral BID    ipratropium-albuteroL  3 mL nebulization q6h SPENCER    levothyroxine  100 mcg oral Daily    macitentan  10 mg oral Daily    pantoprazole  40 mg oral Daily    sildenafiL (pulm.hypertension)  20 mg oral TID            PHYSICAL EXAMINATION        Vital Signs:   Visit Vitals  BP (!) 158/74 (BP Location: Left upper arm, Patient Position: Lying)   Pulse (!) 144   Temp (S) 37.4 °C (99.3 °F) (Rectal)   Resp 18   LMP  (LMP Unknown)   SpO2 94%       I/O's:    Intake/Output Summary (Last 24 hours) at 11/23/2024 1608  Last data filed at 11/23/2024 1228  Gross per 24 hour   Intake 100 ml   Output --   Net 100 ml       Sitting in bed with nasal cannula in place  Tachycardic, regular rhythm  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 Labs of note discussed below:    ABG Results         11/23/24 04/13/23     0908 1949    Source Of Oxygen nonrebreather nc          Results from last 7 days   Lab Units 11/23/24  0908   CREATININE mg/dL 1.0   BUN mg/dL 14   WBC K/uL 6.61   HEMOGLOBIN g/dL 14.8   PLATELETS K/uL 238       Chest x-ray with significant cardiomegaly, left-sided effusion.    11/23/2024 CT chest without contrast  Left small to moderate pleural effusion with compressive atelectasis, air bronchograms.  Progressed when compared to CT chest from 7/25/2024.    ASSESSMENT AND PLAN      Assessment:  Acute hypoxemic respiratory failure secondary to progression of metastatic adenocarcinoma and left malignant pleural effusion  ILD secondary to scleroderma, not in acute exacerbation  Group I pulmonary hypertension    I reviewed her CT chest.  On my review, there is no endobronchial mucous.  Additionally, she denies any signs or symptoms of aspiration.  I believe that this is progression of her metastatic lung adenocarcinoma with compressive atelectasis from her worsening effusion.     Plan:  No indication for bronchoscopy at this time  Low concern for aspiration, can allow her to eat  Patient would benefit from  therapeutic thoracentesis.  Given that her pleural effusion reoccurred 7 weeks after her last thora, and it is malignant, she would be a candidate for a Pleurx catheter placement by interventional radiology for symptomatic relief.  Recommended palliative care consult to further explore goals of care given her metastatic malignancy without treatment options.  Her and her daughters are open to talking with palliative care.         Magdy Avina MD  Pulmonary & Critical Care Medicine   11/23/2024  4:08 PM

## 2024-11-23 NOTE — ASSESSMENT & PLAN NOTE
Trial off bipap per patient request  CT Chest to further delineate L thorax-> evidence of worsening L pleural effusion, mucous plugging and debris concerning for aspiration.   PEP therapy, mucomyst/dukourtney  Pulm consult  SLP eval  IR consult for TAI marquez, tentatively next week unless she clinically decompensates  Received cefepime for possible PNA in ED, will monitor off abx for now given no leukocytosis or fever  Allergic to most diuretics, continue home ethacrynic acid

## 2024-11-23 NOTE — ASSESSMENT & PLAN NOTE
Chronic left pleural effusion, slightly worse on xray  ? malignant  Plan for thoracentesis with pleurx placement next week

## 2024-11-23 NOTE — H&P
Hospital Medicine  History & Physical        CHIEF COMPLAINT   SOB     HISTORY OF PRESENT ILLNESS      Arielle Felix is a 64 y.o. female with a past medical history of pulmonary hypertension, hypertension, PAD status post stent, Raynaud's, systemic scleroderma, ILD, PE, adenocarcinoma of the lung who presented to the emergency room secondary to worsening shortness of breath.  She reports that she has been short of breath for approximately 1 month or longer.  Per outpatient documentation she was recently seen by Dr. Arvizu in his office on 11/19.  She did voice that she was short of breath at that time.  He was concerned that she had reaccumulation of her left-sided pleural effusion and wanted her evaluated that day for possible thoracentesis.  It appears that family declined and she went home.  She then followed up with Lukasz Sigala on 11/21.  Per her recollection she is not a candidate for any further treatment and this is confirmed with her daughters.  Since then the shortness of breath had progressively worsened thus prompting her to come to the emergency room today.    In the emergency room she was initially in acute respiratory distress and ultimately placed on BiPAP with improvement.  Laboratory studies showed a mild elevation in potassium 5.3, creatinine 1.0, WBC 6.61, hemoglobin 14.8, hide sensitive troponin negative x 2, BNP was 213, VBG showed a pH of 7.38, pCO2 44 pO2 of 49.    She was seen in ED bed 12.  At this time she is bleeding to have the BiPAP turned off.  Respiratory therapy was at bedside who has been weaning down the pressures and currently was 8/5 at 50% which patient felt was too much pressure for her.  We discussed that we would be able to trial her off BiPAP and see how she does but if she were to go back into respiratory distress would have to replace.  We discussed her CODE STATUS and currently she is DNR/DNI    I did call her daughter Tiffanie Ko via telephone.  They just  recently stepped out to go to lunch.  History was confirmed with the daughter.  We did discuss that the patient voiced that she wanted to be DNR/DNI which seem to be a surprise to the daughters.  We discussed that further conversation can be had and if this changes to let us know.    PAST MEDICAL AND SURGICAL HISTORY      Past Medical History:   Diagnosis Date    At risk for falls     De Quervain's tenosynovitis     Essential (primary) hypertension     Follicular carcinoma of thyroid (CMS/HCC)     Gastro-esophageal reflux     Hand ulceration (CMS/HCC)     Hyperlipidemia, unspecified     Interstitial lung disease (CMS/HCC)     Leg ulcer (CMS/HCC)     Lung nodule     Lupus     O2 dependent     On 2L    Osteomyelitis of hand (CMS/HCC)     Osteoporosis     Pulmonary hypertension (CMS/HCC)     PVD (peripheral vascular disease) (CMS/HCC)     Raynaud's disease     Severe    Reflux esophagitis     Scleroderma (CMS/HCC)     Screening mammogram for breast cancer 05/04/2021    BI-RADS category: 1 - Negative (care everywhere @ [facility])    Vertigo     Wound, open, foot     bilateral,dressing change with medihoney and mupirocin ointment by homecare nurse       Past Surgical History   Procedure Laterality Date    ANGIOGRAM/ANGIOPLASTY FEMORAL , POSS. STENT/COIL/FEM-POP Bilateral 8/14/2024    Performed by Rashel Richards MD FACS at [facility] [place]    Cardiac catheterization      Colonoscopy      Hernia repair      ION Navigational Bronchoscopy with Radial Probe and Fluoroscopy + EBUS N/A 8/8/2024    Performed by Joel Rodriguez MD at [facility] [place]    RIGHT HEART CATH N/A 11/28/2022    Performed by Timothy Arvizu DO at [facility] [place]    Thyroidectomy         PCP: Sara Simmons MD    MEDICATIONS      Prior to Admission medications    Medication Sig Start Date End Date Taking? Authorizing Provider   amLODIPine (NORVASC) 5 mg tablet Take 10 mg by mouth every morning.    Provider, Kera Urena MD   atorvastatin (LIPITOR) 40 mg tablet Take 1  tablet (40 mg total) by mouth daily. 10/1/24   Timothy Arvizu DO   benzonatate (TESSALON) 100 mg capsule Take 100 mg by mouth 3 (three) times a day as needed for cough.    ProviderKera MD   biotin 1 mg tablet Take 1,000 mcg by mouth daily.    Romel Hidalgo MD   buprenorphine (BUTRANS) 5 mcg/hour patch weekly Place 1 patch on the skin every (seven) 7 days.    Kera Hidalgo MD   cholecalciferol, vitamin D3, 1,000 unit (25 mcg) tablet Take 1,000 Units by mouth daily.    Romel Hidalgo MD   clopidogreL (PLAVIX) 75 mg tablet Take 1 tablet (75 mg total) by mouth daily. 10/1/24   Timothy Arvizu DO   collagenase (SantyL) ointment Apply 1 Application topically daily.    ProviderKera MD   cyanocobalamin, vitamin B-12, 1,000 mcg capsule Take 1,000 mcg by mouth daily. 2/14/20   Romel Hidalgo MD   cyclobenzaprine (FLEXERIL) 5 mg tablet Take 1 tablet (5 mg total) by mouth 3 (three) times a day as needed for muscle spasms (/ chest pain). 8/21/24 9/20/24  Maddy Cope MD   docusate sodium (COLACE) 100 mg capsule Take 100 mg by mouth daily as needed for constipation.    Romel Hidalgo MD   gabapentin (NEURONTIN) 100 mg capsule Take 2 capsules (200 mg total) by mouth 3 (three) times a day. 9/3/24 10/3/24  Chicho Mccray MD   honey (MEDIHONEY) 100 % paste Apply topically daily. 8/21/24 9/20/24  Maddy Cope MD   hydrocortisone 1 % cream Apply to affected area 2 times daily 8/5/24 8/5/25  Naun Prasad MD   levothyroxine (SYNTHROID) 100 mcg tablet Take 100 mcg by mouth daily. BRAND ONLY 7/6/20   Romel Hidalgo MD   lidocaine (ASPERCREME) 4 % adhesive patch,medicated topical patch Apply 1 patch topically daily as needed (pain). Remove & discard patch within 12 hours or as directed by prescriber. 8/5/24 9/4/24  Naun Prasad MD   loperamide (IMODIUM) 2 mg capsule Take 2 mg by mouth every 8 (eight) hours as needed for diarrhea.    Provider, Kera  MD Romel   meclizine (ANTIVERT) 25 mg tablet Take 25 mg by mouth daily as needed.    Provider, Historical, MD   MEDIHONEY, HONEY, TOP Apply topically daily.    Kera Hidalgo MD   mupirocin (BACTROBAN) 2 % ointment Apply 1 application. topically daily. Behind ears    Kera Hidalgo MD   ondansetron (ZOFRAN) 4 mg tablet Take 1 tablet (4 mg total) by mouth every 8 (eight) hours as needed for nausea or vomiting for up to 7 days. 8/21/24 11/19/24  Maddy Cope MD   OPSUMIT 10 mg tablet tablet Take 1 tablet (10 mg total) by mouth daily. 7/23/24 11/19/24  Molly Hidalgo CRNP   pantoprazole (PROTONIX) 40 mg EC tablet Take 1 tablet (40 mg total) by mouth daily Indications: gastroesophageal reflux disease. 9/4/24 11/19/24  Chicho Mccray MD   polyethylene glycol (MIRALAX) 17 gram packet Take 17 g by mouth 2 (two) times a day as needed (constipation) for up to 5 days. 8/21/24 11/19/24  Maddy Cope MD   sildenafiL, pulm.hypertension, (REVATIO) 20 mg tablet Take 1 tablet (20 mg total) by mouth 3 (three) times a day. 1/4/24   Timothy Arvizu DO   sodium chloride (OCEAN) 0.65 % nasal spray Administer 2 sprays into each nostril daily.    ProviderKera MD   tiZANidine (ZANAFLEX) 2 mg tablet Take 2 mg by mouth every 6 (six) hours as needed for muscle spasms.    Kera Hidalgo MD   tocilizumab (ACTEMRA) 200 mg/10 mL (20 mg/mL) solution Infuse 8 mg/kg into a venous catheter every 28 (twentyeight) days.    Kera Hidalgo MD       ALLERGIES      Aspirin, Ibuprofen, Iodine, Iodine and iodide containing products, Morphine, Penicillins, Sulfa (sulfonamide antibiotics), Clindamycin, Hydrochlorothiazide, Oxycodone, Sulfamethoxazole-trimethoprim, and Latex    FAMILY HISTORY      Family History   Problem Relation Name Age of Onset    Heart disease Biological Brother          stent    Breast cancer Niece      Cervical cancer Neg Hx      Uterine cancer Neg Hx      Colon cancer Neg Hx       Ovarian cancer Neg Hx         SOCIAL HISTORY      Social History     Socioeconomic History    Marital status:    Tobacco Use    Smoking status: Former     Current packs/day: 0.00     Types: Cigarettes     Quit date: 9/15/2005     Years since quittin.2    Smokeless tobacco: Never    Tobacco comments:     Would smoke 1/2 of 1/2 PPD, quit in    Vaping Use    Vaping status: Never Used   Substance and Sexual Activity    Alcohol use: Never    Drug use: Never    Sexual activity: Not Currently     Birth control/protection: Post-menopausal     Social Drivers of Health     Food Insecurity: No Food Insecurity (2024)    Hunger Vital Sign     Worried About Running Out of Food in the Last Year: Never true     Ran Out of Food in the Last Year: Never true   Transportation Needs: No Transportation Needs (2024)    PRAPARE - Transportation     Lack of Transportation (Medical): No     Lack of Transportation (Non-Medical): No   Housing Stability: Low Risk  (2024)    Housing Stability Vital Sign     Unable to Pay for Housing in the Last Year: No     Number of Places Lived in the Last Year: 1     Unstable Housing in the Last Year: No       REVIEW OF SYSTEMS      All other systems reviewed and negative except as noted in HPI    PHYSICAL EXAMINATION      Temp:  [36.7 °C (98.1 °F)-37.4 °C (99.3 °F)] 37.4 °C (99.3 °F)  Heart Rate:  [] 104  Resp:  [24-28] 24  BP: (163-192)/(77-87) 192/87  FiO2 (%) (Set):  [50 %-60 %] 50 %  There is no height or weight on file to calculate BMI.    Physical Exam  Vitals and nursing note reviewed.   Constitutional:       Comments: Thin and frail appearing, tearful   HENT:      Head: Normocephalic and atraumatic.   Cardiovascular:      Rate and Rhythm: Normal rate and regular rhythm.   Pulmonary:      Comments: Diminished breath sounds bilaterally, more pronounced on the left.  No wheezes or rhonchi noted  Abdominal:      General: Abdomen is flat.      Palpations: Abdomen  is soft.   Skin:     General: Skin is warm and dry.   Neurological:      General: No focal deficit present.      Mental Status: Mental status is at baseline.   Psychiatric:      Comments: Tearful         LABS / IMAGING / EKG        Labs  Per HPI    Imaging  I have independently reviewed the pertinent imaging from the last 24 hrs.      ECG/Telemetry  I have independently reviewed the telemetry. No events for the last 24 hours.    ASSESSMENT AND PLAN              Assessment & Plan  Acute respiratory failure with hypoxia (CMS/HCC)  Trial off bipap per patient request  CT Chest to further delineate L thorax-> evidence of worsening L pleural effusion, mucous plugging and debris concerning for aspiration.   PEP therapy, mucomyst/duonebs  Pulm consult  SLP eval  IR consult for TAI marquez, tentatively next week unless she clinically decompensates  Received cefepime for possible PNA in ED, will monitor off abx for now given no leukocytosis or fever  Allergic to most diuretics, continue home ethacrynic acid    Interstitial lung disease (CMS/HCC)  Failed mycophenolate outpatient per notes  Acute on chronic heart failure with preserved ejection fraction (CMS/HCC)  Trial of IV lasix x 1  Consult to Dr. Arvizu  CHF orderset in place  Daily weights, I/Os  Pulmonary hypertension (CMS/HCC)  Continue revatio/opsumit  Consult to Dr. Arvizu  Hypothyroid  Continue synthroid  Pleural effusion  Chronic left pleural effusion, slightly worse on xray  ? malignant  Plan for thoracentesis with pleurx placement next week  Peripheral arterial disease (CMS/HCC)  Plavix/statin, holding plavix for pleurx placement  VTE Assessment: Padua    VTE Prophylaxis: Current anticoagulants:  enoxaparin (LOVENOX) syringe 40 mg, subcutaneous, Daily (6p)      Code Status: DNR (A.N.D.)        Estimated Discharge Date: 11/26/2024  Disposition Planning: admit     Cortez DO Scar  11/23/2024

## 2024-11-23 NOTE — NURSING NOTE
Respiratory Care Note: 11/23 14:10     11/23/24 1241   STOP-Bang Questionnaire   Questionnaire not completed Not appropriate/ indicated   Do you snore loudly? 0   Is BMI greater than 35 kg/m2? 0=No   Age older than 50 years old? 1=Yes   Gender - Male 0=No   Recent BMI (Calculated) 17.8   Do you often feel tired or fatigued after your sleep? 1   Has anyone ever observed you stop breathing in your sleep? 0   Do you have or are you being treated for high blood pressure? 0   Neck Circumference Greater Than (17 inches Male) or (16 inches Female) 0   STOP-Bang Total Score 2

## 2024-11-23 NOTE — ED PROVIDER NOTES
Emergency Medicine Note  HPI   HISTORY OF PRESENT ILLNESS     Patient is a 64-year-old female presenting with family for evaluation of shortness of breath.  Family reports acute worsening of shortness of breath prior night.  No known fevers or chills at home.  No worsening cough.  HPI limited secondary acuity of condition.      Shortness of Breath        Patient History   PAST HISTORY     Reviewed from Nursing Triage:       Past Medical History:   Diagnosis Date   • At risk for falls    • De Quervain's tenosynovitis    • Essential (primary) hypertension    • Follicular carcinoma of thyroid (CMS/HCC)    • Gastro-esophageal reflux    • Hand ulceration (CMS/HCC)    • Hyperlipidemia, unspecified    • Interstitial lung disease (CMS/HCC)    • Leg ulcer (CMS/HCC)    • Lung nodule    • Lupus    • O2 dependent     On 2L   • Osteomyelitis of hand (CMS/HCC)    • Osteoporosis    • Pulmonary hypertension (CMS/HCC)    • PVD (peripheral vascular disease) (CMS/HCC)    • Raynaud's disease     Severe   • Reflux esophagitis    • Scleroderma (CMS/HCC)    • Screening mammogram for breast cancer 05/04/2021    BI-RADS category: 1 - Negative (care everywhere @ [place])   • Vertigo    • Wound, open, foot     bilateral,dressing change with medihoney and mupirocin ointment by homecare nurse       Past Surgical History   Procedure Laterality Date   • ANGIOGRAM/ANGIOPLASTY FEMORAL , POSS. STENT/COIL/FEM-POP Bilateral 8/14/2024    Performed by Rashel Richards MD FACS at [facility] [place]   • Cardiac catheterization     • Colonoscopy     • Hernia repair     • ION Navigational Bronchoscopy with Radial Probe and Fluoroscopy + EBUS N/A 8/8/2024    Performed by Joel Rodriguez MD at [facility] OR   • RIGHT HEART CATH N/A 11/28/2022    Performed by Timothy Arvizu DO at [facility] CARDIAC CATH/EP   • Thyroidectomy         Family History   Problem Relation Name Age of Onset   • Heart disease Biological Brother          stent   • Breast cancer Niece     • Cervical cancer Neg Hx      • Uterine cancer Neg Hx     • Colon cancer Neg Hx     • Ovarian cancer Neg Hx         Social History     Tobacco Use   • Smoking status: Former     Current packs/day: 0.00     Types: Cigarettes     Quit date: 9/15/2005     Years since quittin.2   • Smokeless tobacco: Never   • Tobacco comments:     Would smoke 1/2 of 1/2 PPD, quit in    Vaping Use   • Vaping status: Never Used   Substance Use Topics   • Alcohol use: Never   • Drug use: Never         Review of Systems   REVIEW OF SYSTEMS     Review of Systems   Unable to perform ROS: Acuity of condition   Respiratory:  Positive for shortness of breath.          VITALS     ED Vitals      Date/Time Temp Pulse Resp BP SpO2 [facility]   2452 -- 93 28 173/80 100 %    2438  37.4 °C (99.3 °F) -- -- -- -- GB   24 0849 36.7 °C (98.1 °F) 116 27 163/77  51 % SCF          Pulse Ox %: (!) 50 % (24)  Pulse Ox Interpretation: Low (24)  Heart Rate: 115 (24)  Rhythm Strip Interpretation: Sinus Tachycardia (24)     Physical Exam   PHYSICAL EXAM     Physical Exam  Vitals and nursing note reviewed.   Constitutional:       General: She is in acute distress.      Appearance: She is ill-appearing and toxic-appearing. She is not diaphoretic.   HENT:      Mouth/Throat:      Mouth: Mucous membranes are moist.      Pharynx: Oropharynx is clear.   Cardiovascular:      Rate and Rhythm: Regular rhythm. Tachycardia present.   Pulmonary:      Effort: Tachypnea, accessory muscle usage and respiratory distress present.      Breath sounds: Decreased breath sounds and rales present.   Abdominal:      Palpations: Abdomen is soft.      Tenderness: There is no abdominal tenderness.   Musculoskeletal:         General: Normal range of motion.      Right lower leg: No edema.      Left lower leg: No edema.   Skin:     General: Skin is warm and dry.         PROCEDURES     Critical Care    Performed by: Yoseph Escudero MD  Authorized  by: Yoseph Escudero MD    Critical care provider statement:     Critical care time (minutes):  45    Critical care was necessary to treat or prevent imminent or life-threatening deterioration of the following conditions:  Respiratory failure    Critical care was time spent personally by me on the following activities:  Ordering and performing treatments and interventions, ordering and review of laboratory studies, ordering and review of radiographic studies, pulse oximetry, re-evaluation of patient's condition, review of old charts, examination of patient and evaluation of patient's response to treatment       DATA     Results       Procedure Component Value Units Date/Time    SARS-COV-2 (COVID-19)/ FLU A/B, AND RSV, PCR Nasopharynx [[id number]]  (Normal) Collected: 11/23/24 0909    Specimen: Nasopharyngeal Swab from Nasopharynx Updated: 11/23/24 1042     SARS-CoV-2 (COVID-19) Negative     Influenza A Negative     Influenza B Negative     Respiratory Syncytial Virus Negative    Narrative:      Testing performed using real-time PCR for detection of COVID-19. EUA approved validation studies performed on site.     Magnesium [[id number]]  (Normal) Collected: 11/23/24 0908    Specimen: Blood, Venous Updated: 11/23/24 1016     Magnesium 1.9 mg/dL      Comment: Moderate hemolysis, results may be affected.        HS Troponin (with 2 hour reflex) [[id number]]  (Normal) Collected: 11/23/24 0908    Specimen: Blood, Venous Updated: 11/23/24 1001     High Sens Troponin I 11.9 pg/mL     Blood Culture Blood, Venous [[id number]] Collected: 11/23/24 0908    Specimen: Blood, Venous Updated: 11/23/24 1000    Blood Culture Blood, Venous [[id number]] Collected: 11/23/24 0908    Specimen: Blood, Venous Updated: 11/23/24 1000    Comprehensive metabolic panel [[id number]]  (Abnormal) Collected: 11/23/24 0908    Specimen: Blood, Venous Updated: 11/23/24 0955     Sodium 140 mEQ/L      Comment: Moderate hemolysis, results may be affected.          Potassium 5.3 mEQ/L      Comment: Results obtained on plasma. Plasma Potassium values may be up to 0.4 mEQ/L less than serum values. The differences may be greater for patients with high platelet or white cell counts.  Moderate hemolysis, results may be affected.         Chloride 107 mEQ/L      CO2 23 mEQ/L      BUN 14 mg/dL      Creatinine 1.0 mg/dL      Glucose 83 mg/dL      Calcium 10.0 mg/dL      AST (SGOT) 30 IU/L      Comment: Moderate hemolysis, results may be affected.         ALT (SGPT) 4 IU/L      Alkaline Phosphatase 62 IU/L      Total Protein 9.0 g/dL      Comment: Test performed on plasma which typically contains approximately 0.4 g/dL more protein than serum.        Albumin 4.5 g/dL      Bilirubin, Total 0.5 mg/dL      eGFR >60.0 mL/min/1.73m*2      Comment: Calculation based on the Chronic Kidney Disease Epidemiology Collaboration (CKD-EPI) equation refit without adjustment for race.        Anion Gap 10 mEQ/L     CBC and differential [[id number]]  (Abnormal) Collected: 11/23/24 0908    Specimen: Blood, Venous Updated: 11/23/24 0953     WBC 6.61 K/uL      RBC 4.96 M/uL      Hemoglobin 14.8 g/dL      Hematocrit 47.1 %      MCV 95.0 fL      MCH 29.8 pg      MCHC 31.4 g/dL      RDW 17.0 %      Platelets 238 K/uL      MPV 11.6 fL      Differential Type Manu     Neutrophils 74 %      Lymphocytes 19 %      Monocytes 5 %      Eosinophils 1 %      Basophils 0 %      Bands 1 %      Neutrophils, Absolute 4.89 K/uL      Lymphocytes, Absolute 1.26 K/uL      Monocytes, Absolute 0.33 K/uL      Eosinophils, Absolute 0.07 K/uL      Basophils, Absolute 0.00 K/uL      Bands, Absolute 0.07 K/uL      PLT Morphology Normal     Platelet Estimate Adequate (150,000-400,000)     Anisocytosis 1+     Poikilocytes 1+    BNP (B-type natriuretic peptide) [[id number]]  (Abnormal) Collected: 11/23/24 0908    Specimen: Blood, Venous Updated: 11/23/24 0951      pg/mL     VBG Comprehensive [[id number]]  (Abnormal) Collected:  11/23/24 0908    Specimen: Blood, Venous Updated: 11/23/24 0929     pH, Venous 7.38     pCO2, Venous 44 mm Hg      pO2, Venous 49 mm Hg      HCO3, Venous 24.9 mEQ/L      Base Excess, Venous 0.6 mEQ/L      Oxygen Saturation, Venous 76 %      TCO2, Venous 27.4 mEQ/L      Lactate 0.9 mmol/L      Glucose, Venous 89 mg/dL      Sodium Venous 141 mEQ/L      Potassium, Venous 3.9 mEQ/L      Chloride, Venous 110 mEQ/L      Ionized Calcium, Venous 1.27 mmol/L      Hemoglobin, Venous 12.2 g/dL      Carboxyhemoglobin 1.6 %      Methemoglobin 0.0 %      Source Of Oxygen nonrebreather     FIO2 98%    [facility] Draw Panel [[id number]] Collected: 11/23/24 0908    Specimen: Blood, Venous Updated: 11/23/24 0924    Narrative:      The following orders were created for panel order [facility] Draw Panel.  Procedure                               Abnormality         Status                     ---------                               -----------         ------                     RAINBOW LAVENDER[[id number]]                                                            RAINBOW LT BLUE[[id number]]                                  In process                   Please view results for these tests on the individual orders.    RAINBOW LT BLUE [[id number]] Collected: 11/23/24 0908    Specimen: Blood, Venous Updated: 11/23/24 0924            Imaging Results              X-RAY CHEST 1 VIEW (Final result)  Result time 11/23/24 09:16:57      Final result                   Impression:    IMPRESSION: Please see comment. Recommend follow-up radiographs or chest CT in  6-8 weeks.                   Narrative:    CLINICAL HISTORY: Chest pain or SOB, pleurisy or effusion suspected    COMMENT: Frontal view of the chest obtained.    COMPARISON: 9/2/2024.    LINES/TUBES: None.    OTHER: Similar mild to moderate pulmonary edema. Left pleural effusion has  increased, though worsening left basilar opacities are also possible. No  definite pneumothorax or large pleural  effusion. Stable cardiomediastinal  silhouette.                                      ECG 12 lead          Scoring tools                                  ED Course & MDM   MDM / ED COURSE / CLINICAL IMPRESSION / DISPO     Medical Decision Making  This is a very ill-appearing 64-year-old female who presents with her family for evaluation of shortness of breath.  Patient initially tachypneic and significantly hypoxic on her home nasal cannula.  Improved with nonrebreather.  Tachycardic.  Hypertensive.  Remainder of exam as above.  Plan for BiPAP for respiratory support.  Will check labs imaging and above.  Confirmed to be full code, would want ET intubation if necessary, with family at bedside.    Problems Addressed:  Acute respiratory failure with hypoxia (CMS/HCC): complicated acute illness or injury with systemic symptoms that poses a threat to life or bodily functions  Pleural effusion on left: complicated acute illness or injury with systemic symptoms that poses a threat to life or bodily functions    Amount and/or Complexity of Data Reviewed  Independent Historian:      Details: Family at bedside.   External Data Reviewed: notes.  Labs: ordered.  Radiology: ordered.  ECG/medicine tests: ordered.    Risk  Prescription drug management.  Decision regarding hospitalization.      ED Course as of 11/23/24 1220   Sat Nov 23, 2024   0916 Patient reassessed at this time.  Respiratory status improving on BiPAP.  Family updated at bedside. [LA]   0917 Chest x-ray independently interpreted by me -large left pleural effusion, multifocal infiltrates.    Will cover with broad spectrum abx.  [LA]   0929 ECG independently interpreted by me.  Normal sinus rhythm at a rate of 84 bpm.  No significant ST segment elevations or depressions.  No STEMI. [LA]   1150 Patient reassessed at this time. Breathing comfortably on bipap. Updated on labs / imaging. Agreeable to admit.  [LA]      ED Course User Index  [LA] Yoseph Escudero MD      Clinical Impression      Acute respiratory failure with hypoxia (CMS/HCC)   Pleural effusion on left     _________________       ED Disposition   Admit / Observation                       Yoseph Escudero MD  11/23/24 5526

## 2024-11-23 NOTE — CONSULTS
Cardiology   Consult Note     Assessment/Plan   ASSESSMENT AND RECOMMENDATIONS      Pulmonary Hypertension (WHO Group I, NYHA/WHO FC III)   Systemic sclerosis, Raynaud's Disease, interstitial lung disease  Hypertension  Nonobstructive coronary artery disease  Concern for aspiration, awaiting SLP consult    She will need left thoracentesis, and at this time, as it seems like her cancer therapy is as of yet undecided, she may benefit from a Pleurx catheter  Please wean oxygen to the lowest flow necessary to maintain a saturation of 88%  Continue sildenafil as ordered and macitentan as ordered (she is largely NPO because of concern for aspiration but should get these medications if at all possible)  Continue all other cardiac medications as ordered       Subjective   SUBJECTIVE     History of Present Illness  Arielle Felix is a 64 y.o. female with a PMHx as below presenting in the setting of worsening dyspnea and found to have worsening left pleural effusion.  This was known by examination and a Tuesday office visit with Dr. Arvizu, the patient did not want to miss her oncology appointment in subsequent appointments, so she chose to not have that addressed at that time.  Unfortunately she became acutely more dyspneic last night, which prompted her to come to the ER.    She is tachycardic and on oxygen.  Imaging is consistent with a large left pleural effusion.    Admission ECG shows a sinus rhythm, poor R wave progression (precordial lead misplacement?)       Objective   OBJECTIVE     Vitals and Physical Exam    Visit Vitals  BP (!) 181/75 (BP Location: Left upper arm, Patient Position: Lying)   Pulse (!) 111   Temp (S) 37.4 °C (99.3 °F) (Rectal)   Resp 20   LMP  (LMP Unknown)   SpO2 100%     BP Readings from Last 5 Encounters:   11/23/24 (!) 181/75   11/19/24 120/72   10/03/24 128/76   09/03/24 120/79   08/05/24 (!) 148/75       Intake/Output Summary (Last 24 hours) at 11/23/2024 2813  Last data filed at  11/23/2024 1228  Gross per 24 hour   Intake 100 ml   Output --   Net 100 ml     No intake/output data recorded.  Weights (last 7 days)       None           There is no height or weight on file to calculate BMI.  There is no height or weight on file to calculate BSA.    Physical Exam  Neck:      Vascular: No carotid bruit or JVD.   Cardiovascular:      Rate and Rhythm: Regular rhythm. Tachycardia present.      Pulses: Normal pulses.      Heart sounds: Murmur (1/6 CANDIS) heard.   Pulmonary:      Effort: Pulmonary effort is normal. No respiratory distress.      Breath sounds: Normal breath sounds.   Musculoskeletal:      Right lower leg: No edema.      Left lower leg: No edema.      Comments: Bilateral tip of the fingers erosion   Skin:     General: Skin is warm and dry.      Comments: Gauze wrapped around ankle.   Thick facial and limb skin.   Neurological:      General: No focal deficit present.      Mental Status: She is alert and oriented to person, place, and time.         Diagnostic Data    Imaging  CT CHEST WITHOUT IV CONTRAST    Result Date: 11/23/2024  IMPRESSION: 1. Worsening mucous plugging/aspiration in the left lung with complete collapse left lower lobe and increasing subsegmental atelectasis left upper lobe superimposed upon mild background CHF and pulmonary emphysema/fibrosis. At least partially loculated moderate left pleural effusion is slightly larger, potentially malignant given increasing areas of peripheral pleural parenchymal nodularity, though this is indeterminate in the setting of additional airspace opacity suspicious for aspiration pneumonia (asymmetric increased interlobular septal thickening/edema changes in the left lung may potentially also represent lymphangitic spread of neoplasm). Recommend close clinical and imaging follow-up; ultimately, repeat PET/CT is likely necessary. 2. Incompletely evaluated renal hyperdensities, for which nonemergent outpatient enhanced abdominal MRI is  advised.    X-RAY CHEST 1 VIEW    Result Date: 2024  IMPRESSION: Please see comment. Recommend follow-up radiographs or chest CT in 6-8 weeks.      --    History    Past Medical History    Past Medical History:   Diagnosis Date   • At risk for falls    • De Quervain's tenosynovitis    • Essential (primary) hypertension    • Follicular carcinoma of thyroid (CMS/HCC)    • Gastro-esophageal reflux    • Hand ulceration (CMS/HCC)    • Hyperlipidemia, unspecified    • Interstitial lung disease (CMS/HCC)    • Leg ulcer (CMS/HCC)    • Lung nodule    • Lupus    • O2 dependent     On 2L   • Osteomyelitis of hand (CMS/HCC)    • Osteoporosis    • Pulmonary hypertension (CMS/HCC)    • PVD (peripheral vascular disease) (CMS/HCC)    • Raynaud's disease     Severe   • Reflux esophagitis    • Scleroderma (CMS/HCC)    • Screening mammogram for breast cancer 2021    BI-RADS category: 1 - Negative (care everywhere @ [place])   • Vertigo    • Wound, open, foot     bilateral,dressing change with medihoney and mupirocin ointment by homecare nurse       Family History    Family History   Problem Relation Name Age of Onset   • Heart disease Biological Brother          stent   • Breast cancer Niece     • Cervical cancer Neg Hx     • Uterine cancer Neg Hx     • Colon cancer Neg Hx     • Ovarian cancer Neg Hx         Social History    Social History     Socioeconomic History   • Marital status:    Tobacco Use   • Smoking status: Former     Current packs/day: 0.00     Types: Cigarettes     Quit date: 9/15/2005     Years since quittin.2   • Smokeless tobacco: Never   • Tobacco comments:     Would smoke 1/2 of 1/2 PPD, quit in    Vaping Use   • Vaping status: Never Used   Substance and Sexual Activity   • Alcohol use: Never   • Drug use: Never   • Sexual activity: Not Currently     Birth control/protection: Post-menopausal     Social Drivers of Health     Food Insecurity: No Food Insecurity (2024)    Hunger Vital  Sign    • Worried About Running Out of Food in the Last Year: Never true    • Ran Out of Food in the Last Year: Never true   Transportation Needs: No Transportation Needs (8/9/2024)    PRAPARE - Transportation    • Lack of Transportation (Medical): No    • Lack of Transportation (Non-Medical): No   Housing Stability: Low Risk  (8/9/2024)    Housing Stability Vital Sign    • Unable to Pay for Housing in the Last Year: No    • Number of Places Lived in the Last Year: 1    • Unstable Housing in the Last Year: No       Allergies  Aspirin, Ibuprofen, Iodine, Iodine and iodide containing products, Morphine, Penicillins, Sulfa (sulfonamide antibiotics), Clindamycin, Hydrochlorothiazide, Oxycodone, Sulfamethoxazole-trimethoprim, and Latex    Medications    Inpatient  Current Facility-Administered Medications   Medication Dose Route Frequency   • acetylcysteine  200 mg nebulization BID (8a, 8p)   • amLODIPine  10 mg oral q AM   • atorvastatin  40 mg oral Daily (6p)   • buprenorphine  1 patch transdermal q7 days   • cholecalciferol (vitamin D3)  25 mcg oral Daily   • [Provider Managed Hold] clopidogreL  75 mg oral Daily   • glucose  16-32 g of dextrose oral PRN    Or   • dextrose  15-30 g of dextrose oral PRN    Or   • glucagon  1 mg intramuscular PRN    Or   • dextrose 50 % in water (D50)  25 mL intravenous PRN   • glucose  16-32 g of dextrose oral PRN    Or   • dextrose  15-30 g of dextrose oral PRN    Or   • glucagon  1 mg intramuscular PRN    Or   • dextrose 50 % in water (D50)  25 mL intravenous PRN   • enoxaparin  40 mg subcutaneous Daily (6p)   • ethacrynic acid  25 mg oral BID   • ipratropium-albuteroL  3 mL nebulization q6h SPENCER   • labetalol  10 mg intravenous q6h PRN   • levothyroxine  100 mcg oral Daily   • macitentan  10 mg oral Daily   • pantoprazole  40 mg oral Daily   • sildenafiL (pulm.hypertension)  20 mg oral TID   • tiZANidine  2 mg oral q6h PRN       Home   No current facility-administered medications on  file prior to encounter.     Current Outpatient Medications on File Prior to Encounter   Medication Sig Dispense Refill   • amLODIPine (NORVASC) 5 mg tablet Take 10 mg by mouth every morning.     • atorvastatin (LIPITOR) 40 mg tablet Take 1 tablet (40 mg total) by mouth daily. 90 tablet 3   • benzonatate (TESSALON) 100 mg capsule Take 100 mg by mouth 3 (three) times a day as needed for cough.     • biotin 1 mg tablet Take 1,000 mcg by mouth daily.     • buprenorphine (BUTRANS) 5 mcg/hour patch weekly Place 1 patch on the skin every (seven) 7 days.     • cholecalciferol, vitamin D3, 1,000 unit (25 mcg) tablet Take 1,000 Units by mouth daily.     • clopidogreL (PLAVIX) 75 mg tablet Take 1 tablet (75 mg total) by mouth daily. 90 tablet 3   • collagenase (SantyL) ointment Apply 1 Application topically daily.     • cyanocobalamin, vitamin B-12, 1,000 mcg capsule Take 1,000 mcg by mouth daily.     • cyclobenzaprine (FLEXERIL) 5 mg tablet Take 1 tablet (5 mg total) by mouth 3 (three) times a day as needed for muscle spasms (/ chest pain). 90 tablet 0   • docusate sodium (COLACE) 100 mg capsule Take 100 mg by mouth daily as needed for constipation.     • gabapentin (NEURONTIN) 100 mg capsule Take 2 capsules (200 mg total) by mouth 3 (three) times a day. 180 capsule 0   • honey (MEDIHONEY) 100 % paste Apply topically daily. 15 mL 0   • hydrocortisone 1 % cream Apply to affected area 2 times daily 15 g 0   • levothyroxine (SYNTHROID) 100 mcg tablet Take 100 mcg by mouth daily. BRAND ONLY     • lidocaine (ASPERCREME) 4 % adhesive patch,medicated topical patch Apply 1 patch topically daily as needed (pain). Remove & discard patch within 12 hours or as directed by prescriber. 30 patch 0   • loperamide (IMODIUM) 2 mg capsule Take 2 mg by mouth every 8 (eight) hours as needed for diarrhea.     • meclizine (ANTIVERT) 25 mg tablet Take 25 mg by mouth daily as needed.     • MEDIHONEY, HONEY, TOP Apply topically daily.     • mupirocin  (BACTROBAN) 2 % ointment Apply 1 application. topically daily. Behind ears     • ondansetron (ZOFRAN) 4 mg tablet Take 1 tablet (4 mg total) by mouth every 8 (eight) hours as needed for nausea or vomiting for up to 7 days. 21 tablet 0   • OPSUMIT 10 mg tablet tablet Take 1 tablet (10 mg total) by mouth daily. 30 tablet 0   • pantoprazole (PROTONIX) 40 mg EC tablet Take 1 tablet (40 mg total) by mouth daily Indications: gastroesophageal reflux disease. 30 tablet 0   • polyethylene glycol (MIRALAX) 17 gram packet Take 17 g by mouth 2 (two) times a day as needed (constipation) for up to 5 days. 10 each 0   • sildenafiL, pulm.hypertension, (REVATIO) 20 mg tablet Take 1 tablet (20 mg total) by mouth 3 (three) times a day. 270 tablet 3   • sodium chloride (OCEAN) 0.65 % nasal spray Administer 2 sprays into each nostril daily.     • tiZANidine (ZANAFLEX) 2 mg tablet Take 2 mg by mouth every 6 (six) hours as needed for muscle spasms.     • tocilizumab (ACTEMRA) 200 mg/10 mL (20 mg/mL) solution Infuse 8 mg/kg into a venous catheter every 28 (twentyeight) days.

## 2024-11-24 PROBLEM — I50.32 CHRONIC HEART FAILURE WITH PRESERVED EJECTION FRACTION (CMS/HCC): Chronic | Status: ACTIVE | Noted: 2023-04-14

## 2024-11-24 PROBLEM — G89.3 CANCER ASSOCIATED PAIN: Status: ACTIVE | Noted: 2024-11-24

## 2024-11-24 PROBLEM — C34.90 ADENOCARCINOMA, LUNG (CMS/HCC): Status: ACTIVE | Noted: 2024-11-24

## 2024-11-24 PROBLEM — J91.0 MALIGNANT PLEURAL EFFUSION: Status: ACTIVE | Noted: 2024-11-24

## 2024-11-24 PROBLEM — R45.89 EMOTIONAL DISTRESS: Status: ACTIVE | Noted: 2024-11-24

## 2024-11-24 PROBLEM — N18.31 STAGE 3A CHRONIC KIDNEY DISEASE (CMS/HCC): Chronic | Status: ACTIVE | Noted: 2024-11-24

## 2024-11-24 PROBLEM — R64 CACHEXIA (CMS/HCC): Status: ACTIVE | Noted: 2024-11-24

## 2024-11-24 PROBLEM — J96.21 ACUTE ON CHRONIC RESPIRATORY FAILURE WITH HYPOXIA (CMS/HCC): Status: ACTIVE | Noted: 2024-11-23

## 2024-11-24 LAB
ANION GAP SERPL CALC-SCNC: 7 MEQ/L (ref 3–15)
BUN SERPL-MCNC: 14 MG/DL (ref 7–25)
CALCIUM SERPL-MCNC: 8.8 MG/DL (ref 8.6–10.3)
CHLORIDE SERPL-SCNC: 108 MEQ/L (ref 98–107)
CO2 SERPL-SCNC: 26 MEQ/L (ref 21–31)
CREAT SERPL-MCNC: 1.1 MG/DL (ref 0.6–1.2)
EGFRCR SERPLBLD CKD-EPI 2021: 56.2 ML/MIN/1.73M*2
ERYTHROCYTE [DISTWIDTH] IN BLOOD BY AUTOMATED COUNT: 16.7 % (ref 11.7–14.4)
GLUCOSE SERPL-MCNC: 92 MG/DL (ref 70–99)
HCT VFR BLD AUTO: 35 % (ref 35–45)
HGB BLD-MCNC: 10.9 G/DL (ref 11.8–15.7)
MCH RBC QN AUTO: 30.1 PG (ref 28–33.2)
MCHC RBC AUTO-ENTMCNC: 31.1 G/DL (ref 32.2–35.5)
MCV RBC AUTO: 96.7 FL (ref 83–98)
PLATELET # BLD AUTO: 202 K/UL (ref 150–369)
PMV BLD AUTO: 11.1 FL (ref 9.4–12.3)
POTASSIUM SERPL-SCNC: 3.8 MEQ/L (ref 3.5–5.1)
RBC # BLD AUTO: 3.62 M/UL (ref 3.93–5.22)
SODIUM SERPL-SCNC: 141 MEQ/L (ref 136–145)
WBC # BLD AUTO: 7.85 K/UL (ref 3.8–10.5)

## 2024-11-24 PROCEDURE — 85027 COMPLETE CBC AUTOMATED: CPT | Performed by: INTERNAL MEDICINE

## 2024-11-24 PROCEDURE — 99233 SBSQ HOSP IP/OBS HIGH 50: CPT | Performed by: INTERNAL MEDICINE

## 2024-11-24 PROCEDURE — 82310 ASSAY OF CALCIUM: CPT | Performed by: INTERNAL MEDICINE

## 2024-11-24 PROCEDURE — 25800000 HC PHARMACY IV SOLUTIONS: Performed by: INTERNAL MEDICINE

## 2024-11-24 PROCEDURE — 92610 EVALUATE SWALLOWING FUNCTION: CPT | Mod: GN

## 2024-11-24 PROCEDURE — 36415 COLL VENOUS BLD VENIPUNCTURE: CPT | Performed by: INTERNAL MEDICINE

## 2024-11-24 PROCEDURE — 99223 1ST HOSP IP/OBS HIGH 75: CPT | Performed by: INTERNAL MEDICINE

## 2024-11-24 PROCEDURE — 21400000 HC ROOM AND CARE CCU/INTERMEDIATE

## 2024-11-24 PROCEDURE — 63600000 HC DRUGS/DETAIL CODE: Mod: JZ | Performed by: HOSPITALIST

## 2024-11-24 PROCEDURE — 63700000 HC SELF-ADMINISTRABLE DRUG: Performed by: INTERNAL MEDICINE

## 2024-11-24 PROCEDURE — 63600000 HC DRUGS/DETAIL CODE: Mod: JZ | Performed by: INTERNAL MEDICINE

## 2024-11-24 PROCEDURE — 25000000 HC PHARMACY GENERAL: Performed by: INTERNAL MEDICINE

## 2024-11-24 RX ADMIN — TIZANIDINE 2 MG: 4 TABLET ORAL at 14:18

## 2024-11-24 RX ADMIN — ACETAMINOPHEN 650 MG: 325 TABLET ORAL at 22:31

## 2024-11-24 RX ADMIN — TIZANIDINE 2 MG: 4 TABLET ORAL at 22:31

## 2024-11-24 RX ADMIN — ACETYLCYSTEINE 200 MG: 200 INHALANT RESPIRATORY (INHALATION) at 08:49

## 2024-11-24 RX ADMIN — SODIUM CHLORIDE 500 ML: 9 INJECTION, SOLUTION INTRAVENOUS at 08:52

## 2024-11-24 RX ADMIN — PANTOPRAZOLE SODIUM 40 MG: 40 TABLET, DELAYED RELEASE ORAL at 08:49

## 2024-11-24 RX ADMIN — TIZANIDINE 2 MG: 4 TABLET ORAL at 06:43

## 2024-11-24 RX ADMIN — SILDENAFIL 20 MG: 20 TABLET, FILM COATED ORAL at 20:45

## 2024-11-24 RX ADMIN — LORAZEPAM 0.5 MG: 2 INJECTION INTRAMUSCULAR; INTRAVENOUS at 00:01

## 2024-11-24 RX ADMIN — LEVOTHYROXINE SODIUM 100 MCG: 0.1 TABLET ORAL at 05:53

## 2024-11-24 RX ADMIN — ACETAMINOPHEN 650 MG: 325 TABLET ORAL at 00:59

## 2024-11-24 RX ADMIN — ACETAMINOPHEN 650 MG: 325 TABLET ORAL at 14:18

## 2024-11-24 RX ADMIN — Medication 25 MCG: at 08:49

## 2024-11-24 RX ADMIN — SILDENAFIL 20 MG: 20 TABLET, FILM COATED ORAL at 08:49

## 2024-11-24 RX ADMIN — ACETAMINOPHEN 650 MG: 325 TABLET ORAL at 06:43

## 2024-11-24 RX ADMIN — IPRATROPIUM BROMIDE AND ALBUTEROL SULFATE 3 ML: .5; 3 SOLUTION RESPIRATORY (INHALATION) at 17:25

## 2024-11-24 RX ADMIN — ENOXAPARIN SODIUM 40 MG: 40 INJECTION SUBCUTANEOUS at 17:25

## 2024-11-24 RX ADMIN — SILDENAFIL 20 MG: 20 TABLET, FILM COATED ORAL at 14:18

## 2024-11-24 ASSESSMENT — COGNITIVE AND FUNCTIONAL STATUS - GENERAL
REMEMBERING WHERE THINGS ARE: 4 - NONE
WALKING IN HOSPITAL ROOM: 3 - A LITTLE
STANDING UP FROM CHAIR USING ARMS: 3 - A LITTLE
CLIMB 3 TO 5 STEPS WITH RAILING: 2 - A LOT
STANDING UP FROM CHAIR USING ARMS: 3 - A LITTLE
WALKING IN HOSPITAL ROOM: 2 - A LOT
REMEMBERING 5 ERRANDS WITH NO LIST: 3 - A LITTLE
MOVING TO AND FROM BED TO CHAIR: 3 - A LITTLE
FOLLOWS FAMILIAR CONVERSATION: 4 - NONE
MOVING TO AND FROM BED TO CHAIR: 3 - A LITTLE
REMEMBERING TO TAKE MEDICATION: 3 - A LITTLE
UNDERSTANDING 10 TO 15 MIN SPEECH: 4 - NONE
CLIMB 3 TO 5 STEPS WITH RAILING: 3 - A LITTLE
TAKING CARE OF COMPLICATED TASKS: 2 - A LOT

## 2024-11-24 NOTE — PROGRESS NOTES
Visited with Ms. Felix and her sisters who were at bedside. Engaged in conversation and prayer as requested. Grateful for spiritual and emotional support. - Rev. Pavan Childs, Spiritual Care Coordinator   [phone]

## 2024-11-24 NOTE — ASSESSMENT & PLAN NOTE
- Butrans discontinued as patient felt it was not helping  - APAP 975 mg tid prn, max 4 g/day  -Continue tizanidine 2 mg q6hr prn  - IV hydromorphone 0.5 mg q2hr prn, give with Zofran to avoid nausea

## 2024-11-24 NOTE — ASSESSMENT & PLAN NOTE
- appreciate spiritual care   - appreciate PCSW    - patient with significant distress as she understands that end of life is near.

## 2024-11-24 NOTE — PROGRESS NOTES
Hospital Medicine Service -  Daily Progress Note       SUBJECTIVE   Interval History:   11/24  Patient's breathing improved. Not short of breath like yesterday. No pain. Usually uses 2 liters at home.     OBJECTIVE      Vital signs in last 24 hours:  Temp:  [36.7 °C (98.1 °F)-37.4 °C (99.3 °F)] 36.9 °C (98.4 °F)  Heart Rate:  [] 82  Resp:  [16-28] 20  BP: ()/(50-87) 109/55  FiO2 (%) (Set):  [50 %-60 %] 50 %    Intake/Output Summary (Last 24 hours) at 11/24/2024 0755  Last data filed at 11/24/2024 0600  Gross per 24 hour   Intake 670 ml   Output --   Net 670 ml       PHYSICAL EXAMINATION        Constitutional: Awake, alert. Mild distress currently.  HEMNT: Mucous membranes moist.  Head: Normocephalic, atraumatic.  Eyes: EOM normal. Mild pallor seen.  Neck: Supple. No thyromegaly present.  Cardiovascular: Normal rate and regular rhythm. No murmur heard.  Pulmonary/Chest: Effort increased. Mild respiratory distress. Few rales.  Abdominal: Scaphoid. No tenderness.   Musculoskeletal: No edema. No tenderness.  Neurological: Awake, alert, oriented x 3. No focal deficits. Globally weak.  Skin: Mild pallor.      LINES, CATHETERS, DRAINS, AIRWAYS, AND WOUNDS   Lines, Drains, and Airways:  Wounds (agree with documentation and present on admission):  Peripheral IV (Adult) 11/23/24 Anterior;Right;Upper Arm (Active)   Number of days: 1       Peripheral IV (Adult) 11/23/24 Anterior;Distal;Right;Upper Arm (Active)   Number of days: 1       Wound Pressure Injury Right Buttock (Active)   Number of days: 1            LABS / IMAGING / TELE      Labs  BMP Results         11/24/24 11/23/24 09/02/24     0349 0908 2245     140 139    K 3.8 5.3 4.2    Cl 108 107 104    CO2 26 23 25    Glucose 92 83 105    BUN 14 14 46    Creatinine 1.1 1.0 1.9    Calcium 8.8 10.0 9.1    Anion Gap 7 10 10    EGFR 56.2 >60.0 29.4           Comment for NA at 0908 on 11/23/24: Moderate hemolysis, results may be affected.     Comment for K  at 0908 on 11/23/24: Results obtained on plasma. Plasma Potassium values may be up to 0.4 mEQ/L less than serum values. The differences may be greater for patients with high platelet or white cell counts.  Moderate hemolysis, results may be affected.     Comment for EGFR at 0349 on 11/24/24: Calculation based on the Chronic Kidney Disease Epidemiology Collaboration (CKD-EPI) equation refit without adjustment for race.    Comment for EGFR at 0908 on 11/23/24: Calculation based on the Chronic Kidney Disease Epidemiology Collaboration (CKD-EPI) equation refit without adjustment for race.    Comment for EGFR at 2245 on 09/02/24: Calculation based on the Chronic Kidney Disease Epidemiology Collaboration (CKD-EPI) equation refit without adjustment for race.            CBC Results         11/24/24 11/23/24 09/03/24     0349 0908 0503    WBC 7.85 6.61 4.20    RBC 3.62 4.96 3.58    HGB 10.9 14.8 9.5    HCT 35.0 47.1 32.4    MCV 96.7 95.0 90.5    MCH 30.1 29.8 26.5    MCHC 31.1 31.4 29.3     238 290              Lab work reviewed by myself      Imaging  CT CHEST WITHOUT IV CONTRAST   Final Result   IMPRESSION:   1. Worsening mucous plugging/aspiration in the left lung with complete collapse   left lower lobe and increasing subsegmental atelectasis left upper lobe   superimposed upon mild background CHF and pulmonary emphysema/fibrosis. At least   partially loculated moderate left pleural effusion is slightly larger,   potentially malignant given increasing areas of peripheral pleural parenchymal   nodularity, though this is indeterminate in the setting of additional airspace   opacity suspicious for aspiration pneumonia (asymmetric increased interlobular   septal thickening/edema changes in the left lung may potentially also represent   lymphangitic spread of neoplasm). Recommend close clinical and imaging   follow-up; ultimately, repeat PET/CT is likely necessary.   2. Incompletely evaluated renal hyperdensities, for  "which nonemergent outpatient   enhanced abdominal MRI is advised.      ECG 12 lead   Final Result      X-RAY CHEST 1 VIEW   Final Result   IMPRESSION: Please see comment. Recommend follow-up radiographs or chest CT in   6-8 weeks.            IR CONSULT    (Results Pending)       Radiology reviewed by myself    ECG/Telemetry  Sinus rhythm seen on tele.    ASSESSMENT AND PLAN               Assessment & Plan  Acute on chronic respiratory failure with hypoxia (CMS/HCC)  Likely due to mucous plugging.  Resolved.   Malignant pleural effusion  Recently diagnosed wit metastatic lung cancer.  Still trying to \"take it in\".  Appreciate pulmonology input.  Await palliative care meeting.  Possible pleurx catheter later this stay.  Interstitial lung disease (CMS/HCC)  Secondary to systemic sclerosis.  Pulmonary hypertension (CMS/HCC)  Continue macitentan, sildenafil, ethacrynic acid.  Chronic heart failure with preserved ejection fraction (CMS/HCC)  Stable at this time.  Hypothyroid  Continue synthroid.  Peripheral arterial disease (CMS/HCC)  Stable.  Cachexia (CMS/HCC)  Nutrition consult.  Overall poor prognostic indicator.  Adenocarcinoma, lung (CMS/HCC)  Recently evaluated at [facility], and told it was stage 4.  Patient and family still trying to process.  Stage 3a chronic kidney disease (CMS/HCC)  Creatinine at baseline at 1.0-1.1    VTE Assessment: Padua    VTE Prophylaxis:  Current anticoagulants:  enoxaparin (LOVENOX) syringe 40 mg, subcutaneous, Daily (6p)      Code Status: Full Code      Estimated Discharge Date: 2024   Disposition Plannin/24  Acute on chronic respiratory failure. Resolved quickly.    Malignant pleural effusion. Await palliative meeting. Pleurx?    Stage 4 adenoca lung. Relatively new diagnosis according to patient. Patient and family still coming to terms with diagnosis.    Transfer to tele.    Total Time Spent in Patient's Care:  More than 50 minutes were spent at the time of this " dictation, which included: reviewing HPI, PMHx, Social Hx, Fhx, VTE prophylaxis, medications, allergies, pertinent diagnostic studies in electronic medical record since this hospitalization; time spent in evaluating the patient and documenting in the medical record. Reviewed plan of care with nursing personnel. Time included counseling, coordinating care, discussing progress, prognosis and further plan of care with the patient.     Dangelo Chávez MD  11/24/2024

## 2024-11-24 NOTE — ASSESSMENT & PLAN NOTE
Recently evaluated at [place], and told it was stage 4.  Patient and family still trying to process.

## 2024-11-24 NOTE — PROGRESS NOTES
"Patient:  Arielle Felix  Location:  [facility] 2010  MRN:  [id number]  Today's date:  11/24/2024    SLP Diagnosis  Grossly function oral and suspected functional pharyngeal phase swallow, asymptomatic with intake    Swallowing Recommendations   SLP Swallowing Recommendations - 11/24/24 1041       Diet Consistency Recommendations regular;thin liquids     Medication Administration whole with liquid     Supervision Level for Intake close supervision needed     Feeding/Delivery Recommendations allow patient to feed self if maintaining safety;small bites/sips;stop meal if showing signs of aspiration or fatigue (e.g., coughing, wet voice);allow extra time for meals     Posture Recommendations fully upright in chair/chair mode of bed     Swallowing Strategies alternate food and liquid intake     Instrumental Assessment Recommendations reassess via non-instrumental clinical swallow evaluation     Oral Hygiene twice daily teeth brushing     Prognosis for Return to Safe Oral Intake good     Comment, Swallowing Recommendations ST will reassess diet tolerance vs need for further instrumental assessment                     Summary/Handoff  Pt was seen for initial swallow evaluation. Arielle Felix is a 64 y.o. female with a past medical history of pulmonary hypertension, hypertension, PAD status post stent, Raynaud's, systemic scleroderma, ILD, PE, adenocarcinoma of the lung who presented to the emergency room secondary to worsening shortness of breath.  She reports that she has been short of breath for approximately 1 month or longer. Per Pulmonology noted, \"In October 2024 she was admitted to [facility].  She underwent thoracentesis and was found to have malignant effusion on the left consistent with adenocarcinoma.  She did follow-up with Lukasz Sigala at [facility] and was told that she is not a candidate for treatment.\" Pulmonology suspects low concern for aspiration, and cleared pt to eat from a " respiratory standpoint. Further recommendations for therapeutic thoracentesis and further Palliaitive medicine consult to discuss GOC given metastatic nature of malignancy was made. Pt was received upright in bed today, awake/alert, and communicating during complex conversation appropriately. AAOx4, and denies any swallowing difficulties. She passed a 3 oz water protocol without swallowing difficulty noted. She tolerated further trials of solids and liquids without s/sx aspiration or distress with intake. Given low suspicion of aspiration during bedside swallow evaluation, and per Pulmonology recommendations, recommend continuing a regular solid and thin liquid diet with aspiration precautions, medications whole with liquids as tolerated, and further GOC discussion d/t ongoing treatment of respiratory malignancy. ST will briefly follow to ensure diet tolerance vs need for reassessment as appropriate.    3 ounce water test was administered.  The positive predictive value for aspiration when used as a part of the [facility] Swallow Protocol was approximately 78% with this measure according to relevant research.  Sensitivity/specificity was variable in different patient populations. Patient Passed the 3 oz water test this day as evidenced by no overt signs or symptoms of aspiration during or immediately following.  Patient's performance on this measure suggests Low suspicion for aspiration.      Active Diet Orders (From admission, onward)       Start        11/23/24 1606  Adult Diet Regular; Cardiac (Low Sodium/Low Fat); RD/LDN may adjust order  Diet effective now        Question Answer Comment   Diet Texture Regular    Other Restriction(s): Cardiac (Low Sodium/Low Fat)    Delegation of Authority. Diet orders written by PA/CRRossy may not be adjusted by RD/LDNs. RD/LDN may adjust order                           No notes on file    SLP Pain      Date/Time Pain Type Rating: Rest Rating: Activity Who   11/24/24 1041 Pain  Assessment 0 - no pain 0 - no pain CK            Prior Level of Function      Flowsheet Row Most Recent Value   Communication understands/communicates without difficulty   Swallowing swallows foods/liquids without difficulty   Baseline Diet/Method of Nutritional Intake no diet restrictions   Past History of Dysphagia No prior hx of dysphagia per pt chart or pt report, pt denies any swallowing difficulty with intake   Prior Level of Function Comment consumes reg/thin liquid diet without difficulty at baseline                SLP Evaluation and Treatment - 11/24/24 1041          SLP Time Calculation    Start Time 1041     Stop Time 1059     Time Calculation (min) 18 min     Document Type Initial Evaluation        General Information    Position at Start of Session upright;in bed     Status at Start of Session agreeable to therapy     General Observations of Patient RN cleared pt to be seen for initial swallow eval        Precautions/Limitations/Impairments    Existing Precautions/Restrictions aspiration;fall        Cognition/Psychosocial    Comment, Cognition AAOx4, pleasant and cooperative, communicating appropriately        Dentition (Oral Motor)    Dentition (Oral Motor) natural dentition        Facial Symmetry (Oral Motor)    Facial Symmetry (Oral Motor) WNL        Lip Function (Oral Motor)    Lip Range of Motion (Oral Motor) WNL        Tongue Function (Oral Motor)    Tongue ROM (Oral Motor) WNL        Jaw Function (Oral Motor)    Jaw Function (Oral Motor) WNL        Cough/Swallow/Gag Reflex (Oral Motor)    Volitional Swallow (Oral Motor) WNL        Vocal Quality/Secretion Management (Oral Motor)    Vocal Quality (Oral Motor) WNL     Secretion Management (Oral Motor) WNL        GRBAS    Grade 0-->none     Roughness 0-->none     Breathiness 0-->none     Asthenia 0-->none     Strain 0-->none        Motor Speech    Comment, Motor Speech Assessment WFL        Auditory Comprehension    Comment, Assessment (Auditory  Comprehension) WFL, able to follow simple commands and communicate during complex conversation appropriately        Verbal Expression    Comment, Assessment (Verbal Expression) WFL, able to communicate during complex conversation appropriately        Functional Communication Measures    FCM: Swallowing 7-->Level 7        General Swallowing Observations    Current Diet/Method of Nutritional Intake regular;thin liquids     Signs/Symptoms of Aspiration (Current Diet) other (see comments)   LLL complete collapse, c/f mucus pluggging    Respiratory Support (General Swallowing Observations) nasal cannula   4L    Comment, Secretions/Suctioning unremarkable     Comment, General Swallowing Observations cough at baseline, increased following breathing tx        Food and Liquid Trials (NIS)    Patient Positioning HOB elevated (specify degrees)     Oral Intake/Feeding Performance set-up of food/liquid needed     Liquid Consistencies Evaluated thin liquids     Thin Liquids WFL;intact;sips from cup;straw sips     Comment, Thin Liquids passed 3 oz water protocol     Food Consistencies Evaluated pureed (PU4);regular     Pureed (PU4) WFL;intact     Regular intact     Comment, Oral Phase adequate oral acceptance/containment, grossly functional manipulation and transfer of solids and liquids, no signfiicant oral residuals following transfer     Comment, Pharyngeal Phase No overt s/sx aspiration, passed 3 oz water protocol, unchanged WOB and VQ with intake     Esophageal Phase of Swallow frequent belching     SpO2 Level stable at 96-97% SPO2 level on 4L NC        Swallowing Intervention    Dysphagia/Swallowing Interventions monitor tolerance of;current diet without evidence of aspiration        AM-PAC™ - Cognition (Current Function)    Following/understanding a 10-15 minute speech or presentation? 4 - None     Understanding familiar people during ordinary conversations? 4 - None     Remembering to take medications at the appropriate  time? 3 - A little     Remembering where things were placed or put away? 4 - None     Remembering a list of 3 or 4 errands without writing it down? 3 - A little     Taking care of complicated tasks? 2 - A lot     AM-PAC™ Cognition Score 20        SLP Goals    Swallowing Goal Selection oral nutrition/hydration, SLP Goal 1        Session Outcome    Position at End of Session upright;in bed     Status at End of Session bed alarm on;all needs met     Nursing Notified patient's performance;patient's response to therapy/activity        Plan    Rehab Potential good, to achieve stated therapy goals     Therapy Frequency 3 times/wk     Planned Therapy Interventions dysphagia therapy                            Education Documentation  Signs/Symptoms, taught by Guillermina Marino CCC-SLP at 11/24/2024  4:24 PM.  Learner: Patient  Readiness: Acceptance  Method: Explanation  Response: Verbalizes Understanding  Comment: Rationale of diet recommendations and aspiration precautions    Risk Factors, taught by Guillermina Marino CCC-SLP at 11/24/2024  4:24 PM.  Learner: Patient  Readiness: Acceptance  Method: Explanation  Response: Verbalizes Understanding  Comment: Rationale of diet recommendations and aspiration precautions          SLP Goals      Flowsheet Row Most Recent Value   Oral Nutrition/Hydration Goal 1    Activity effective/safe/independent at 11/24/2024 1041   Time Frame short-term goal (STG) at 11/24/2024 1041

## 2024-11-24 NOTE — ASSESSMENT & PLAN NOTE
- significant debility in the setting of scleroderma, ILD, and metastatic malignancy, pleural effusion s/p thoracentesis was briefly on Bipap.   - lives at home alone with 24 hour HHA services,  requires frequent assistance from family  - high risk for further decline

## 2024-11-24 NOTE — CONSULTS
"Palliative Medicine Consult         Assessment/Plan    Palliative care by specialist  Assessment & Plan  64 year old with metastatic lung adenocarcinoma, ILD, scleroderma, and PH presenting with worsening dyspnea.    - goals are life-prolonging, similar to prior conversations with palliative care  - discussed with patient, two daughters, and patient's sister at length 11/24  - the patient understands she has been told she has a life limiting illness and potential treatment would cause more harm than good, this has been difficult for her emotionally, particularly given there was initially some confusion regarding the staging of her cancer and severity of illness per the patient  - when asked what feels most important, the patient stated she doesn't want to die, but did not elaborate on other focuses  - similar to prior conversations, she will accept any life prolonging intervention offered including CPR, conversely she is generally hesitant to use more medications/interventions as she is worried about potential side effects (eg does not want PleurX due to risk of infection)  - we did not discuss hospice nor a time-based prognosis, she shared that she was asked by her oncologist if she wanted to know a time based prognosis and she very strongly said \"no\"  - patient is able to make complex medical decisions, two daughters would be surrogate decision makers  - would benefit from home based palliative services    Emotional distress  Assessment & Plan  - spiritual care consulted  - PCSW to follow up  - patient has significant frustration regarding past medical events and the current medical situation    Cancer associated pain  Assessment & Plan  - continue Butrans 5 mcg/hr patch q7days  - continue lidocaine patch  - continue acetaminophen 650 mg q6hr prn  - continue tizanidine 2 mg q6hr prn  - patient hesitant to add other medications, particularly any that could cause sedation/confusion    Debility  Assessment & Plan  - " significant debility in the setting of scleroderma, ILD, and metastatic malignancy  - lives at home, requires frequent assistance from family  - high risk for further decline          Nando Naik MD                    Subjective/Objective      Reason for Consult:  Assistance with clarification of goals of care    HPI      Arielle Felix is a 64 y.o. female PMH scleroderma, ILD, PVD, PH, and metastatic lung adenocarcinoma presenting with worsening shortness of breath and pleural effusion. She last had a thoracentesis 7 weeks prior. On exam, she is frail appearing. She has wounds on several toes and is cachectic. She reports some shortness of breath at rest as well as pain. Her sister is at bedside and two daughters on FaceTime.     Past Medical History  Past Medical History:   Diagnosis Date    At risk for falls     De Quervain's tenosynovitis     Essential (primary) hypertension     Follicular carcinoma of thyroid (CMS/HCC)     Gastro-esophageal reflux     Hand ulceration (CMS/HCC)     Hyperlipidemia, unspecified     Interstitial lung disease (CMS/HCC)     Leg ulcer (CMS/HCC)     Lung nodule     Lupus     O2 dependent     On 2L    Osteomyelitis of hand (CMS/HCC)     Osteoporosis     Pulmonary hypertension (CMS/HCC)     PVD (peripheral vascular disease) (CMS/HCC)     Raynaud's disease     Severe    Reflux esophagitis     Scleroderma (CMS/HCC)     Screening mammogram for breast cancer 05/04/2021    BI-RADS category: 1 - Negative (care everywhere @ [facility])    Vertigo     Wound, open, foot     bilateral,dressing change with medihoney and mupirocin ointment by homecare nurse           Current Medications:  Current Facility-Administered Medications   Medication Dose Route Frequency Provider Last Rate Last Admin    acetaminophen (TYLENOL) tablet 650 mg  650 mg oral q6h PRN Gerard Quach, DO   650 mg at 11/24/24 1418    acetylcysteine (MUCOMYST) 200 mg/mL (20 %) solution 200 mg  200 mg nebulization BID (8a, 8p)  Cortez Abbott, DO   200 mg at 11/24/24 0849    [Provider Managed Hold] amLODIPine (NORVASC) tablet 10 mg  10 mg oral q AM Cortez Abbott, DO   10 mg at 11/23/24 1344    atorvastatin (LIPITOR) tablet 40 mg  40 mg oral Daily (6p) Cortez Abbott, DO        buprenorphine (BUTRANS) 5 mcg/hour patch 1 patch  1 patch transdermal q7 days Cortez Abbott, DO        cholecalciferol (vitamin D3) tablet 25 mcg  25 mcg oral Daily Cortez Abbott, DO   25 mcg at 11/24/24 0849    [Provider Managed Hold] clopidogreL (PLAVIX) tablet 75 mg  75 mg oral Daily Cortez Abbott, DO        glucose chewable tablet 16-32 g of dextrose  16-32 g of dextrose oral PRN Cortez Abbott, DO        Or    dextrose 40 % oral gel 15-30 g of dextrose  15-30 g of dextrose oral PRN Cortez Abbott, DO        Or    glucagon (GLUCAGEN) injection 1 mg  1 mg intramuscular PRN Cortez Abbott, DO        Or    dextrose 50 % in water (D50) injection 12.5 g  25 mL intravenous PRN Cortez Abbott, DO        glucose chewable tablet 16-32 g of dextrose  16-32 g of dextrose oral PRN Cortez Abbott, DO        Or    dextrose 40 % oral gel 15-30 g of dextrose  15-30 g of dextrose oral PRN Cortez Abbott, DO        Or    glucagon (GLUCAGEN) injection 1 mg  1 mg intramuscular PRN Cortez Abbott, DO        Or    dextrose 50 % in water (D50) injection 12.5 g  25 mL intravenous PRN Cortez Abbott, DO        enoxaparin (LOVENOX) syringe 40 mg  40 mg subcutaneous Daily (6p) Cortez Abbott, DO   40 mg at 11/23/24 1737    [Provider Managed Hold] ethacrynic acid (EDECRIN) tablet 25 mg  25 mg oral BID Cortez Abbott, DO   25 mg at 11/23/24 2049    ipratropium-albuteroL (DUO-NEB) 0.5-2.5 mg/3 mL nebulizer solution 3 mL  3 mL nebulization q6h SPENCER Cortez Abbott, DO   3 mL at 11/23/24 2140    labetaloL (NORMODYNE,TRANDATE) injection 10 mg  10 mg intravenous q6h PRN Cortez Abbott, DO        levothyroxine (SYNTHROID) tablet 100 mcg  100 mcg oral Daily Cortez Abbott, DO    100 mcg at 11/24/24 0553    macitentan (OPSUMIT) tablet 10 mg - PT OWN MED  10 mg oral Daily Scar, Cortez, DO   10 mg at 11/24/24 0849    pantoprazole (PROTONIX) tablet,delayed release (DR/EC) 40 mg  40 mg oral Daily Scar, Cortez, DO   40 mg at 11/24/24 0849    sildenafiL (pulm.hypertension) (REVATIO) tablet 20 mg  20 mg oral TID Scar, Cortez, DO   20 mg at 11/24/24 1418    tiZANidine (ZANAFLEX) tablet 2 mg  2 mg oral q6h PRN Scar, Cortez, DO   2 mg at 11/24/24 1418         Palliative Assessment      Palliative Performance Score: 40    Objective    Physical Exam:   Physical Exam  Constitutional:       General: She is not in acute distress.     Appearance: She is ill-appearing.   Pulmonary:      Effort: No respiratory distress.   Abdominal:      General: There is no distension.   Skin:     Comments: Sclerodermic skin, wounds on toes   Neurological:      General: No focal deficit present.   Psychiatric:         Thought Content: Thought content normal.           Vitals:  Vitals:    11/24/24 1545   BP:    Pulse: 78   Resp:    Temp:    SpO2:        Laboratory Studies:  CBC Results         11/24/24 11/23/24 09/03/24     0349 0908 0503    WBC 7.85 6.61 4.20    RBC 3.62 4.96 3.58    HGB 10.9 14.8 9.5    HCT 35.0 47.1 32.4    MCV 96.7 95.0 90.5    MCH 30.1 29.8 26.5    MCHC 31.1 31.4 29.3     238 290          CMP Results         11/24/24 11/23/24 09/02/24     0349 0908 2245     140 139    K 3.8 5.3 4.2    Cl 108 107 104    CO2 26 23 25    Glucose 92 83 105    BUN 14 14 46    Creatinine 1.1 1.0 1.9    Calcium 8.8 10.0 9.1    Anion Gap 7 10 10    AST -- 30 16    ALT -- 4 11    Albumin -- 4.5 3.6    EGFR 56.2 >60.0 29.4           Comment for NA at 0908 on 11/23/24: Moderate hemolysis, results may be affected.     Comment for K at 0908 on 11/23/24: Results obtained on plasma. Plasma Potassium values may be up to 0.4 mEQ/L less than serum values. The differences may be greater for patients with high  platelet or white cell counts.  Moderate hemolysis, results may be affected.     Comment for AST at 0908 on 11/23/24: Moderate hemolysis, results may be affected.     Comment for EGFR at 0349 on 11/24/24: Calculation based on the Chronic Kidney Disease Epidemiology Collaboration (CKD-EPI) equation refit without adjustment for race.    Comment for EGFR at 0908 on 11/23/24: Calculation based on the Chronic Kidney Disease Epidemiology Collaboration (CKD-EPI) equation refit without adjustment for race.    Comment for EGFR at 2245 on 09/02/24: Calculation based on the Chronic Kidney Disease Epidemiology Collaboration (CKD-EPI) equation refit without adjustment for race.          Lab Results   Component Value Date    QTCCALCULAT 475 11/23/2024         Labs reviewed. Notable for cr 1.1    Imaging and Other Studies:     CT CHEST WITHOUT IV CONTRAST    Result Date: 11/23/2024  IMPRESSION: 1. Worsening mucous plugging/aspiration in the left lung with complete collapse left lower lobe and increasing subsegmental atelectasis left upper lobe superimposed upon mild background CHF and pulmonary emphysema/fibrosis. At least partially loculated moderate left pleural effusion is slightly larger, potentially malignant given increasing areas of peripheral pleural parenchymal nodularity, though this is indeterminate in the setting of additional airspace opacity suspicious for aspiration pneumonia (asymmetric increased interlobular septal thickening/edema changes in the left lung may potentially also represent lymphangitic spread of neoplasm). Recommend close clinical and imaging follow-up; ultimately, repeat PET/CT is likely necessary. 2. Incompletely evaluated renal hyperdensities, for which nonemergent outpatient enhanced abdominal MRI is advised.    X-RAY CHEST 1 VIEW    Result Date: 11/23/2024  IMPRESSION: Please see comment. Recommend follow-up radiographs or chest CT in 6-8 weeks.                                                                    Cardiac Imaging    TRANSTHORACIC ECHO (TTE) LIMITED 07/19/2024    Interpretation Summary  Rhythm is sinus tachycardia.    Mild increased wall thickness with normal left ventricular cavity and preserved systolic function. EF 65%. No wall motion abnormalities.  Normal right ventricular size with preserved systolic function.  IVC normal in size with >50% respiratory variation consistent with normal right sided filling pressures.  Small pericardial effusion. No evidence of hemodynamic compromise with normal respiratory variation and no chamber collapse. Prominent epicardial fat.  Compared with previous study of 5/23/2024, small pericardial effusion persists.

## 2024-11-24 NOTE — CONSULTS
Cardiology   Consult Note     Assessment/Plan   ASSESSMENT AND RECOMMENDATIONS      Pulmonary Hypertension (WHO Group I, NYHA/WHO FC III)   Systemic sclerosis, Raynaud's Disease, interstitial lung disease  Hypertension  Nonobstructive coronary artery disease  Concern for aspiration, awaiting SLP consult    She will need left thoracentesis. Agree with consideration for Pleur-x catheter given that she is not being offered treatment for her Lung AdenoCA and will have near certain likelihood of recurrence.    Please wean oxygen to the lowest flow necessary to maintain a saturation of 88%  Continue uninterrupted Sildenafil 20 mg TID and Macitentan 10 mg daily  Clopidogrel can be held as needed        Subjective     SUBJECTIVE     Interval History: Seen and examined sitting up in bed with sister at bedside. She reports that her breathing feels improved today. She currently denies chest pain/pressure or light-headedness.     Consult Background from 11/23:   Arielle Felix is a 64 y.o. female with a PMHx as below presenting in the setting of worsening dyspnea and found to have worsening left pleural effusion.  This was known by examination and a Tuesday office visit with Dr. Arvizu, the patient did not want to miss her oncology appointment in subsequent appointments, so she chose to not have that addressed at that time.  Unfortunately she became acutely more dyspneic last night, which prompted her to come to the ER.    She is tachycardic and on oxygen.  Imaging is consistent with a large left pleural effusion.        Objective   OBJECTIVE     Vitals and Physical Exam    Visit Vitals  BP (!) 125/59 (BP Location: Left upper arm, Patient Position: Lying)   Pulse 80   Temp 36.7 °C (98 °F) (Temporal)   Resp 20   Wt 48.7 kg (107 lb 6.4 oz)   LMP  (LMP Unknown)   SpO2 99%   BMI 17.33 kg/m²     BP Readings from Last 5 Encounters:   11/24/24 (!) 125/59   11/19/24 120/72   10/03/24 128/76   09/03/24 120/79   08/05/24 (!)  148/75       Intake/Output Summary (Last 24 hours) at 11/24/2024 1212  Last data filed at 11/24/2024 1056  Gross per 24 hour   Intake 1470 ml   Output --   Net 1470 ml     I/O last 3 completed shifts:  In: 670 [P.O.:570; IV Piggyback:100]  Out: -   Weights (last 7 days)       Date/Time Weight    11/24/24 0347 48.7 kg (107 lb 6.4 oz)           Body mass index is 17.33 kg/m².  Body surface area is 1.51 meters squared.    Physical Exam  Neck:      Vascular: No carotid bruit or JVD.   Cardiovascular:      Rate and Rhythm: Regular rhythm. Tachycardia present.      Pulses: Normal pulses.      Heart sounds: Murmur (1/6 CANDIS) heard.   Pulmonary:      Effort: Pulmonary effort is normal. No respiratory distress.      Breath sounds: Normal breath sounds.   Musculoskeletal:      Right lower leg: No edema.      Left lower leg: No edema.      Comments: Bilateral tip of the fingers erosion   Skin:     General: Skin is warm and dry.      Comments: Gauze wrapped around ankle.   Thick facial and limb skin.   Neurological:      General: No focal deficit present.      Mental Status: She is alert and oriented to person, place, and time.         Diagnostic Data    Imaging  CT CHEST WITHOUT IV CONTRAST    Result Date: 11/23/2024  IMPRESSION: 1. Worsening mucous plugging/aspiration in the left lung with complete collapse left lower lobe and increasing subsegmental atelectasis left upper lobe superimposed upon mild background CHF and pulmonary emphysema/fibrosis. At least partially loculated moderate left pleural effusion is slightly larger, potentially malignant given increasing areas of peripheral pleural parenchymal nodularity, though this is indeterminate in the setting of additional airspace opacity suspicious for aspiration pneumonia (asymmetric increased interlobular septal thickening/edema changes in the left lung may potentially also represent lymphangitic spread of neoplasm). Recommend close clinical and imaging follow-up;  ultimately, repeat PET/CT is likely necessary. 2. Incompletely evaluated renal hyperdensities, for which nonemergent outpatient enhanced abdominal MRI is advised.    X-RAY CHEST 1 VIEW    Result Date: 2024  IMPRESSION: Please see comment. Recommend follow-up radiographs or chest CT in 6-8 weeks.      --    History    Past Medical History    Past Medical History:   Diagnosis Date   • At risk for falls    • De Quervain's tenosynovitis    • Essential (primary) hypertension    • Follicular carcinoma of thyroid (CMS/HCC)    • Gastro-esophageal reflux    • Hand ulceration (CMS/HCC)    • Hyperlipidemia, unspecified    • Interstitial lung disease (CMS/HCC)    • Leg ulcer (CMS/HCC)    • Lung nodule    • Lupus    • O2 dependent     On 2L   • Osteomyelitis of hand (CMS/HCC)    • Osteoporosis    • Pulmonary hypertension (CMS/HCC)    • PVD (peripheral vascular disease) (CMS/HCC)    • Raynaud's disease     Severe   • Reflux esophagitis    • Scleroderma (CMS/HCC)    • Screening mammogram for breast cancer 2021    BI-RADS category: 1 - Negative (care everywhere @ [facility])   • Vertigo    • Wound, open, foot     bilateral,dressing change with medihoney and mupirocin ointment by homecare nurse       Family History    Family History   Problem Relation Name Age of Onset   • Heart disease Biological Brother          stent   • Breast cancer Niece     • Cervical cancer Neg Hx     • Uterine cancer Neg Hx     • Colon cancer Neg Hx     • Ovarian cancer Neg Hx         Social History    Social History     Socioeconomic History   • Marital status:    Tobacco Use   • Smoking status: Former     Current packs/day: 0.00     Types: Cigarettes     Quit date: 9/15/2005     Years since quittin.2   • Smokeless tobacco: Never   • Tobacco comments:     Would smoke 1/2 of 1/2 PPD, quit in    Vaping Use   • Vaping status: Never Used   Substance and Sexual Activity   • Alcohol use: Never   • Drug use: Never   • Sexual activity: Not  Currently     Birth control/protection: Post-menopausal     Social Drivers of Health     Food Insecurity: No Food Insecurity (11/23/2024)    Hunger Vital Sign    • Worried About Running Out of Food in the Last Year: Never true    • Ran Out of Food in the Last Year: Never true   Transportation Needs: No Transportation Needs (8/9/2024)    PRAPARE - Transportation    • Lack of Transportation (Medical): No    • Lack of Transportation (Non-Medical): No   Housing Stability: Low Risk  (8/9/2024)    Housing Stability Vital Sign    • Unable to Pay for Housing in the Last Year: No    • Number of Places Lived in the Last Year: 1    • Unstable Housing in the Last Year: No       Allergies  Aspirin, Ibuprofen, Iodine, Iodine and iodide containing products, Morphine, Penicillins, Sulfa (sulfonamide antibiotics), Clindamycin, Hydrochlorothiazide, Oxycodone, Sulfamethoxazole-trimethoprim, and Latex    Medications    Inpatient  Current Facility-Administered Medications   Medication Dose Route Frequency   • acetaminophen  650 mg oral q6h PRN   • acetylcysteine  200 mg nebulization BID (8a, 8p)   • [Provider Managed Hold] amLODIPine  10 mg oral q AM   • atorvastatin  40 mg oral Daily (6p)   • buprenorphine  1 patch transdermal q7 days   • cholecalciferol (vitamin D3)  25 mcg oral Daily   • [Provider Managed Hold] clopidogreL  75 mg oral Daily   • glucose  16-32 g of dextrose oral PRN    Or   • dextrose  15-30 g of dextrose oral PRN    Or   • glucagon  1 mg intramuscular PRN    Or   • dextrose 50 % in water (D50)  25 mL intravenous PRN   • glucose  16-32 g of dextrose oral PRN    Or   • dextrose  15-30 g of dextrose oral PRN    Or   • glucagon  1 mg intramuscular PRN    Or   • dextrose 50 % in water (D50)  25 mL intravenous PRN   • enoxaparin  40 mg subcutaneous Daily (6p)   • [Provider Managed Hold] ethacrynic acid  25 mg oral BID   • ipratropium-albuteroL  3 mL nebulization q6h SPENCER   • labetalol  10 mg intravenous q6h PRN   •  levothyroxine  100 mcg oral Daily   • macitentan  10 mg oral Daily   • pantoprazole  40 mg oral Daily   • sildenafiL (pulm.hypertension)  20 mg oral TID   • tiZANidine  2 mg oral q6h PRN       Home   No current facility-administered medications on file prior to encounter.     Current Outpatient Medications on File Prior to Encounter   Medication Sig Dispense Refill   • amLODIPine (NORVASC) 5 mg tablet Take 10 mg by mouth every morning.     • atorvastatin (LIPITOR) 40 mg tablet Take 1 tablet (40 mg total) by mouth daily. 90 tablet 3   • benzonatate (TESSALON) 100 mg capsule Take 100 mg by mouth 3 (three) times a day as needed for cough.     • biotin 1 mg tablet Take 1,000 mcg by mouth daily.     • buprenorphine (BUTRANS) 5 mcg/hour patch weekly Place 1 patch on the skin every (seven) 7 days.     • cholecalciferol, vitamin D3, 1,000 unit (25 mcg) tablet Take 1,000 Units by mouth daily.     • clopidogreL (PLAVIX) 75 mg tablet Take 1 tablet (75 mg total) by mouth daily. 90 tablet 3   • collagenase (SantyL) ointment Apply 1 Application topically daily.     • cyanocobalamin, vitamin B-12, 1,000 mcg capsule Take 1,000 mcg by mouth daily.     • cyclobenzaprine (FLEXERIL) 5 mg tablet Take 1 tablet (5 mg total) by mouth 3 (three) times a day as needed for muscle spasms (/ chest pain). 90 tablet 0   • docusate sodium (COLACE) 100 mg capsule Take 100 mg by mouth daily as needed for constipation.     • gabapentin (NEURONTIN) 100 mg capsule Take 2 capsules (200 mg total) by mouth 3 (three) times a day. 180 capsule 0   • honey (MEDIHONEY) 100 % paste Apply topically daily. 15 mL 0   • hydrocortisone 1 % cream Apply to affected area 2 times daily 15 g 0   • levothyroxine (SYNTHROID) 100 mcg tablet Take 100 mcg by mouth daily. BRAND ONLY     • lidocaine (ASPERCREME) 4 % adhesive patch,medicated topical patch Apply 1 patch topically daily as needed (pain). Remove & discard patch within 12 hours or as directed by prescriber. 30 patch  0   • loperamide (IMODIUM) 2 mg capsule Take 2 mg by mouth every 8 (eight) hours as needed for diarrhea.     • meclizine (ANTIVERT) 25 mg tablet Take 25 mg by mouth daily as needed.     • MEDIHONEY, HONEY, TOP Apply topically daily.     • mupirocin (BACTROBAN) 2 % ointment Apply 1 application. topically daily. Behind ears     • ondansetron (ZOFRAN) 4 mg tablet Take 1 tablet (4 mg total) by mouth every 8 (eight) hours as needed for nausea or vomiting for up to 7 days. 21 tablet 0   • OPSUMIT 10 mg tablet tablet Take 1 tablet (10 mg total) by mouth daily. 30 tablet 0   • pantoprazole (PROTONIX) 40 mg EC tablet Take 1 tablet (40 mg total) by mouth daily Indications: gastroesophageal reflux disease. 30 tablet 0   • polyethylene glycol (MIRALAX) 17 gram packet Take 17 g by mouth 2 (two) times a day as needed (constipation) for up to 5 days. 10 each 0   • sildenafiL, pulm.hypertension, (REVATIO) 20 mg tablet Take 1 tablet (20 mg total) by mouth 3 (three) times a day. 270 tablet 3   • sodium chloride (OCEAN) 0.65 % nasal spray Administer 2 sprays into each nostril daily.     • tiZANidine (ZANAFLEX) 2 mg tablet Take 2 mg by mouth every 6 (six) hours as needed for muscle spasms.     • tocilizumab (ACTEMRA) 200 mg/10 mL (20 mg/mL) solution Infuse 8 mg/kg into a venous catheter every 28 (twentyeight) days.

## 2024-11-24 NOTE — PLAN OF CARE
Problem: Adult Inpatient Plan of Care  Goal: Plan of Care Review  Outcome: Progressing  Flowsheets (Taken 11/24/2024 7331)  Progress: improving  Outcome Evaluation:   swallow eval completed   recommend continuing a regular solid and thin liquid diet with aspiration precautions  Plan of Care Reviewed With: patient     Problem: Swallowing Impairment  Goal: Optimal Eating and Swallowing Without Aspiration  Outcome: Progressing

## 2024-11-24 NOTE — ASSESSMENT & PLAN NOTE
"Recently diagnosed wit metastatic lung cancer.  Still trying to \"take it in\".  Appreciate pulmonology input.  Await palliative care meeting.  Possible pleurx catheter later this stay.  "

## 2024-11-24 NOTE — ASSESSMENT & PLAN NOTE
64 year old with metastatic lung adenocarcinoma, ILD, scleroderma, and PH presenting with worsening dyspnea.    - goals are comfort focused, DNR/DNI, hospice team following to help determine discharge plan  - patient able to make complex medical decisions, two daughters also assist  - discussed with patient and family 11/29, they are figuring out timing of discharge

## 2024-11-24 NOTE — PROGRESS NOTES
Pulmonary Consult Note       SUBJECTIVE   Feeling a little less short of breath. Does not want a Pleurx because doesn't want the infection risk. States 'if there is a risk of infection with something, I usually get it'     OBJECTIVE        Vital Signs:   Visit Vitals  BP (!) 125/59 (BP Location: Left upper arm, Patient Position: Lying)   Pulse 80   Temp 36.7 °C (98 °F) (Temporal)   Resp 20   Wt 48.7 kg (107 lb 6.4 oz)   LMP  (LMP Unknown)   SpO2 99%   BMI 17.33 kg/m²       I/O's:    Intake/Output Summary (Last 24 hours) at 11/24/2024 1345  Last data filed at 11/24/2024 1056  Gross per 24 hour   Intake 1370 ml   Output --   Net 1370 ml       Medications:    Reviewed. Pertinent medications as below.     acetylcysteine  200 mg nebulization BID (8a, 8p)    [Provider Managed Hold] amLODIPine  10 mg oral q AM    atorvastatin  40 mg oral Daily (6p)    buprenorphine  1 patch transdermal q7 days    cholecalciferol (vitamin D3)  25 mcg oral Daily    [Provider Managed Hold] clopidogreL  75 mg oral Daily    enoxaparin  40 mg subcutaneous Daily (6p)    [Provider Managed Hold] ethacrynic acid  25 mg oral BID    ipratropium-albuteroL  3 mL nebulization q6h SPENCER    levothyroxine  100 mcg oral Daily    macitentan  10 mg oral Daily    pantoprazole  40 mg oral Daily    sildenafiL (pulm.hypertension)  20 mg oral TID           PHYSICAL EXAMINATION        Vital Signs:   Visit Vitals  BP (!) 125/59 (BP Location: Left upper arm, Patient Position: Lying)   Pulse 80   Temp 36.7 °C (98 °F) (Temporal)   Resp 20   Wt 48.7 kg (107 lb 6.4 oz)   LMP  (LMP Unknown)   SpO2 99%   BMI 17.33 kg/m²       I/O's:    Intake/Output Summary (Last 24 hours) at 11/24/2024 1345  Last data filed at 11/24/2024 1056  Gross per 24 hour   Intake 1370 ml   Output --   Net 1370 ml       Sitting in bed with nasal cannula in place  RRR  Scleroderma of skin and partial amputation of some digits of her hands  Diminished breath sounds L side, dry crackles on  Right    11/23/24 POCUS of L effusion with 3cm free flowing fluid pocket       Diagnostic Data      Labs/Micro:    I have personally reviewed all pertinent patient laboratory results. Labs of note discussed below:    ABG Results         11/23/24 04/13/23     0908 1949    Source Of Oxygen nonrebreather nc          Results from last 7 days   Lab Units 11/24/24  0349 11/23/24  0908   CREATININE mg/dL 1.1 1.0   BUN mg/dL 14 14   WBC K/uL 7.85 6.61   HEMOGLOBIN g/dL 10.9* 14.8   PLATELETS K/uL 202 238       Chest x-ray with significant cardiomegaly, left-sided effusion.    11/23/2024 CT chest without contrast  Left small to moderate pleural effusion with compressive atelectasis, air bronchograms.  Progressed when compared to CT chest from 7/25/2024.    ASSESSMENT AND PLAN      Assessment:  Acute hypoxemic respiratory failure secondary to progression of metastatic adenocarcinoma and left malignant pleural effusion  ILD secondary to scleroderma, not in acute exacerbation  Group I pulmonary hypertension    I reviewed her CT chest.  On my review, there is no endobronchial mucous.  Additionally, she denies any signs or symptoms of aspiration.  I believe that this is progression of her metastatic lung adenocarcinoma with compressive atelectasis from her worsening effusion.     Plan:  Patient would benefit from therapeutic thoracentesis with interventional radiology.  Given that her pleural effusion reoccurred 7 weeks after her last thora, and it is malignant, she would be a candidate for a Pleurx catheter placement. However, on discussion today, she states that she doesn't want a Pleurx and would prefer thoracentesis as needed when the fluid reaccumulates.   Follow-up palliative care consult to further explore goals of care given her metastatic malignancy without treatment options.  Her and her daughters are open to talking with palliative care.  Wean supplemental O2 for goal >88%  PH medications per Dr. Arvizu    Daughters in  agreement with plan over FaceTime today.          Magdy Avina MD  Pulmonary & Critical Care Medicine   11/24/2024  1:45 PM

## 2024-11-25 ENCOUNTER — APPOINTMENT (INPATIENT)
Dept: RADIOLOGY | Facility: HOSPITAL | Age: 64
DRG: 180 | End: 2024-11-25
Attending: INTERNAL MEDICINE
Payer: COMMERCIAL

## 2024-11-25 LAB
ALBUMIN SERPL-MCNC: 3.6 G/DL (ref 3.5–5.7)
ALP SERPL-CCNC: 55 IU/L (ref 34–125)
ALT SERPL-CCNC: 5 IU/L (ref 7–52)
ANION GAP SERPL CALC-SCNC: 6 MEQ/L (ref 3–15)
AST SERPL-CCNC: 13 IU/L (ref 13–39)
ATRIAL RATE: 107
BASE EXCESS BLDA CALC-SCNC: 0.4 MEQ/L
BILIRUB SERPL-MCNC: 0.4 MG/DL (ref 0.3–1.2)
BUN SERPL-MCNC: 13 MG/DL (ref 7–25)
CA-I BLD-SCNC: 1.26 MMOL/L (ref 1.15–1.27)
CALCIUM SERPL-MCNC: 9.3 MG/DL (ref 8.6–10.3)
CHLORIDE BLDA-SCNC: 107 MEQ/L (ref 98–109)
CHLORIDE SERPL-SCNC: 107 MEQ/L (ref 98–107)
CO2 BLDA-SCNC: 27 MEQ/L (ref 22–32)
CO2 SERPL-SCNC: 28 MEQ/L (ref 21–31)
CREAT SERPL-MCNC: 0.8 MG/DL (ref 0.6–1.2)
EGFRCR SERPLBLD CKD-EPI 2021: >60 ML/MIN/1.73M*2
ERYTHROCYTE [DISTWIDTH] IN BLOOD BY AUTOMATED COUNT: 16.6 % (ref 11.7–14.4)
GLUCOSE BLDA-MCNC: 92 MG/DL (ref 70–99)
GLUCOSE SERPL-MCNC: 91 MG/DL (ref 70–99)
HCO3 BLDA-SCNC: 25 MEQ/L (ref 21–28)
HCT VFR BLD AUTO: 39.4 % (ref 35–45)
HGB BLD-MCNC: 12.1 G/DL (ref 11.8–15.7)
HGB BLDA-MCNC: 11.3 G/DL (ref 12–16)
INR PPP: 1.2
LACTATE BLDA-SCNC: 0.4 MMOL/L (ref 0.4–1.6)
LACTATE SERPL-SCNC: 0.8 MMOL/L (ref 0.4–2)
MCH RBC QN AUTO: 30 PG (ref 28–33.2)
MCHC RBC AUTO-ENTMCNC: 30.7 G/DL (ref 32.2–35.5)
MCV RBC AUTO: 97.8 FL (ref 83–98)
P AXIS: 48
PCO2 BLDA: 45 MM HG (ref 35–48)
PH BLDA: 7.37 PH (ref 7.35–7.45)
PLATELET # BLD AUTO: 181 K/UL (ref 150–369)
PMV BLD AUTO: 11 FL (ref 9.4–12.3)
PO2 BLDA: 145 MM HG (ref 83–100)
POCT PATIENT TEMPERATURE: 98.6 °F (ref 97–99)
POCT TEST (BLD GAS): ABNORMAL
POTASSIUM BLDA-SCNC: 3.9 MEQ/L (ref 3.4–4.5)
POTASSIUM SERPL-SCNC: 4.1 MEQ/L (ref 3.5–5.1)
PR INTERVAL: 166
PROT SERPL-MCNC: 6.9 G/DL (ref 6–8.2)
PROTHROMBIN TIME: 14.9 SEC (ref 12.2–14.5)
QRS DURATION: 76
QT INTERVAL: 352
QTC CALCULATION(BAZETT): 469
R AXIS: 19
RBC # BLD AUTO: 4.03 M/UL (ref 3.93–5.22)
SAO2 % BLDA: 98 % (ref 93–98)
SODIUM BLDA-SCNC: 137 MEQ/L (ref 136–145)
SODIUM SERPL-SCNC: 141 MEQ/L (ref 136–145)
T WAVE AXIS: 88
TROPONIN I SERPL HS-MCNC: 11.7 PG/ML
TROPONIN I SERPL HS-MCNC: 14.1 PG/ML
VENTRICULAR RATE: 107
WBC # BLD AUTO: 6.92 K/UL (ref 3.8–10.5)

## 2024-11-25 PROCEDURE — 80053 COMPREHEN METABOLIC PANEL: CPT | Performed by: INTERNAL MEDICINE

## 2024-11-25 PROCEDURE — 25000000 HC PHARMACY GENERAL: Performed by: PHYSICIAN ASSISTANT

## 2024-11-25 PROCEDURE — 92526 ORAL FUNCTION THERAPY: CPT | Mod: GN

## 2024-11-25 PROCEDURE — 97163 PT EVAL HIGH COMPLEX 45 MIN: CPT | Mod: GP

## 2024-11-25 PROCEDURE — 97530 THERAPEUTIC ACTIVITIES: CPT | Mod: GP

## 2024-11-25 PROCEDURE — 71045 X-RAY EXAM CHEST 1 VIEW: CPT

## 2024-11-25 PROCEDURE — 63700000 HC SELF-ADMINISTRABLE DRUG: Performed by: INTERNAL MEDICINE

## 2024-11-25 PROCEDURE — 21400000 HC ROOM AND CARE CCU/INTERMEDIATE

## 2024-11-25 PROCEDURE — 85610 PROTHROMBIN TIME: CPT | Performed by: INTERNAL MEDICINE

## 2024-11-25 PROCEDURE — 97166 OT EVAL MOD COMPLEX 45 MIN: CPT | Mod: GO

## 2024-11-25 PROCEDURE — 36415 COLL VENOUS BLD VENIPUNCTURE: CPT | Performed by: INTERNAL MEDICINE

## 2024-11-25 PROCEDURE — 93005 ELECTROCARDIOGRAM TRACING: CPT | Performed by: INTERNAL MEDICINE

## 2024-11-25 PROCEDURE — 83605 ASSAY OF LACTIC ACID: CPT | Performed by: INTERNAL MEDICINE

## 2024-11-25 PROCEDURE — 84484 ASSAY OF TROPONIN QUANT: CPT | Performed by: INTERNAL MEDICINE

## 2024-11-25 PROCEDURE — 36600 WITHDRAWAL OF ARTERIAL BLOOD: CPT

## 2024-11-25 PROCEDURE — 93010 ELECTROCARDIOGRAM REPORT: CPT | Performed by: INTERNAL MEDICINE

## 2024-11-25 PROCEDURE — 99233 SBSQ HOSP IP/OBS HIGH 50: CPT | Mod: 25 | Performed by: NURSE PRACTITIONER

## 2024-11-25 PROCEDURE — 97530 THERAPEUTIC ACTIVITIES: CPT | Mod: GO

## 2024-11-25 PROCEDURE — 63600000 HC DRUGS/DETAIL CODE: Mod: JZ | Performed by: NURSE PRACTITIONER

## 2024-11-25 PROCEDURE — 25000000 HC PHARMACY GENERAL: Performed by: INTERNAL MEDICINE

## 2024-11-25 PROCEDURE — 63600000 HC DRUGS/DETAIL CODE: Mod: JZ | Performed by: INTERNAL MEDICINE

## 2024-11-25 PROCEDURE — 97535 SELF CARE MNGMENT TRAINING: CPT | Mod: GO

## 2024-11-25 PROCEDURE — 99233 SBSQ HOSP IP/OBS HIGH 50: CPT | Performed by: INTERNAL MEDICINE

## 2024-11-25 PROCEDURE — 200200 PR NO CHARGE: Performed by: INTERNAL MEDICINE

## 2024-11-25 PROCEDURE — 25000000 HC PHARMACY GENERAL: Performed by: NURSE PRACTITIONER

## 2024-11-25 PROCEDURE — 99497 ADVNCD CARE PLAN 30 MIN: CPT | Mod: 25 | Performed by: NURSE PRACTITIONER

## 2024-11-25 PROCEDURE — 63600000 HC DRUGS/DETAIL CODE: Mod: JZ

## 2024-11-25 PROCEDURE — 85027 COMPLETE CBC AUTOMATED: CPT | Performed by: INTERNAL MEDICINE

## 2024-11-25 PROCEDURE — 0W9B3ZZ DRAINAGE OF LEFT PLEURAL CAVITY, PERCUTANEOUS APPROACH: ICD-10-PCS | Performed by: RADIOLOGY

## 2024-11-25 PROCEDURE — C1729 CATH, DRAINAGE: HCPCS

## 2024-11-25 PROCEDURE — 36100345 IR THORACENTESIS

## 2024-11-25 RX ORDER — SUCRALFATE 1 G/1
1 TABLET ORAL
Status: DISCONTINUED | OUTPATIENT
Start: 2024-11-25 | End: 2024-11-30 | Stop reason: HOSPADM

## 2024-11-25 RX ORDER — LORAZEPAM 2 MG/ML
0.5 INJECTION INTRAMUSCULAR ONCE
Status: COMPLETED | OUTPATIENT
Start: 2024-11-25 | End: 2024-11-25

## 2024-11-25 RX ORDER — LIDOCAINE HYDROCHLORIDE 10 MG/ML
INJECTION, SOLUTION INFILTRATION; PERINEURAL
Status: COMPLETED | OUTPATIENT
Start: 2024-11-25 | End: 2024-11-25

## 2024-11-25 RX ORDER — NITROGLYCERIN 0.4 MG/1
TABLET SUBLINGUAL
Status: DISCONTINUED
Start: 2024-11-25 | End: 2024-11-25 | Stop reason: WASHOUT

## 2024-11-25 RX ORDER — ONDANSETRON HYDROCHLORIDE 2 MG/ML
4 INJECTION, SOLUTION INTRAVENOUS EVERY 8 HOURS PRN
Status: DISCONTINUED | OUTPATIENT
Start: 2024-11-25 | End: 2024-11-26

## 2024-11-25 RX ORDER — ONDANSETRON 4 MG/1
4 TABLET, ORALLY DISINTEGRATING ORAL EVERY 8 HOURS PRN
Status: DISCONTINUED | OUTPATIENT
Start: 2024-11-25 | End: 2024-11-26

## 2024-11-25 RX ORDER — ONDANSETRON HYDROCHLORIDE 2 MG/ML
4 INJECTION, SOLUTION INTRAVENOUS ONCE
Status: COMPLETED | OUTPATIENT
Start: 2024-11-25 | End: 2024-11-25

## 2024-11-25 RX ORDER — HYDROMORPHONE HYDROCHLORIDE 1 MG/ML
0.5 INJECTION, SOLUTION INTRAMUSCULAR; INTRAVENOUS; SUBCUTANEOUS EVERY 2 HOUR PRN
Status: DISCONTINUED | OUTPATIENT
Start: 2024-11-25 | End: 2024-11-26

## 2024-11-25 RX ORDER — FAMOTIDINE 10 MG/ML
20 INJECTION INTRAVENOUS ONCE
Status: COMPLETED | OUTPATIENT
Start: 2024-11-25 | End: 2024-11-25

## 2024-11-25 RX ADMIN — ACETAMINOPHEN 650 MG: 325 TABLET ORAL at 04:39

## 2024-11-25 RX ADMIN — ACETAMINOPHEN 650 MG: 325 TABLET ORAL at 14:27

## 2024-11-25 RX ADMIN — LIDOCAINE HYDROCHLORIDE 10 ML: 10 INJECTION, SOLUTION INFILTRATION; PERINEURAL at 12:04

## 2024-11-25 RX ADMIN — SILDENAFIL 20 MG: 20 TABLET, FILM COATED ORAL at 14:28

## 2024-11-25 RX ADMIN — TIZANIDINE 2 MG: 4 TABLET ORAL at 04:39

## 2024-11-25 RX ADMIN — SALINE NASAL SPRAY 2 SPRAY: 1.5 SOLUTION NASAL at 04:34

## 2024-11-25 RX ADMIN — HYDROMORPHONE HYDROCHLORIDE 0.5 MG: 1 INJECTION, SOLUTION INTRAMUSCULAR; INTRAVENOUS; SUBCUTANEOUS at 18:55

## 2024-11-25 RX ADMIN — IPRATROPIUM BROMIDE AND ALBUTEROL SULFATE 3 ML: .5; 3 SOLUTION RESPIRATORY (INHALATION) at 18:37

## 2024-11-25 RX ADMIN — ONDANSETRON 4 MG: 2 INJECTION INTRAMUSCULAR; INTRAVENOUS at 14:28

## 2024-11-25 RX ADMIN — TIZANIDINE 2 MG: 4 TABLET ORAL at 14:28

## 2024-11-25 RX ADMIN — SUCRALFATE 1 G: 1 TABLET ORAL at 16:20

## 2024-11-25 RX ADMIN — IPRATROPIUM BROMIDE AND ALBUTEROL SULFATE 3 ML: .5; 3 SOLUTION RESPIRATORY (INHALATION) at 12:53

## 2024-11-25 RX ADMIN — LORAZEPAM 0.5 MG: 2 INJECTION INTRAMUSCULAR; INTRAVENOUS at 20:46

## 2024-11-25 RX ADMIN — LABETALOL HYDROCHLORIDE 10 MG: 5 INJECTION, SOLUTION INTRAVENOUS at 12:46

## 2024-11-25 RX ADMIN — ACETYLCYSTEINE 200 MG: 200 INHALANT RESPIRATORY (INHALATION) at 08:48

## 2024-11-25 RX ADMIN — ONDANSETRON 4 MG: 2 INJECTION INTRAMUSCULAR; INTRAVENOUS at 22:50

## 2024-11-25 RX ADMIN — SALINE NASAL SPRAY 2 SPRAY: 1.5 SOLUTION NASAL at 02:03

## 2024-11-25 RX ADMIN — LEVOTHYROXINE SODIUM 100 MCG: 0.1 TABLET ORAL at 07:23

## 2024-11-25 RX ADMIN — HYDROMORPHONE HYDROCHLORIDE 0.5 MG: 1 INJECTION, SOLUTION INTRAMUSCULAR; INTRAVENOUS; SUBCUTANEOUS at 12:52

## 2024-11-25 RX ADMIN — ETHACRYNIC ACID 25 MG: 25 TABLET ORAL at 21:52

## 2024-11-25 RX ADMIN — ENOXAPARIN SODIUM 40 MG: 40 INJECTION SUBCUTANEOUS at 18:10

## 2024-11-25 RX ADMIN — FAMOTIDINE 20 MG: 10 INJECTION, SOLUTION INTRAVENOUS at 19:29

## 2024-11-25 RX ADMIN — PANTOPRAZOLE SODIUM 40 MG: 40 TABLET, DELAYED RELEASE ORAL at 08:43

## 2024-11-25 RX ADMIN — HYDROMORPHONE HYDROCHLORIDE 0.5 MG: 1 INJECTION, SOLUTION INTRAMUSCULAR; INTRAVENOUS; SUBCUTANEOUS at 22:47

## 2024-11-25 RX ADMIN — SILDENAFIL 20 MG: 20 TABLET, FILM COATED ORAL at 08:43

## 2024-11-25 RX ADMIN — TRIMETHOBENZAMIDE HYDROCHLORIDE 200 MG: 100 INJECTION INTRAMUSCULAR at 19:31

## 2024-11-25 RX ADMIN — SALINE NASAL SPRAY 2 SPRAY: 1.5 SOLUTION NASAL at 15:09

## 2024-11-25 RX ADMIN — SILDENAFIL 20 MG: 20 TABLET, FILM COATED ORAL at 21:52

## 2024-11-25 RX ADMIN — HYDROMORPHONE HYDROCHLORIDE 0.5 MG: 1 INJECTION, SOLUTION INTRAMUSCULAR; INTRAVENOUS; SUBCUTANEOUS at 16:32

## 2024-11-25 RX ADMIN — IPRATROPIUM BROMIDE AND ALBUTEROL SULFATE 3 ML: .5; 3 SOLUTION RESPIRATORY (INHALATION) at 02:03

## 2024-11-25 RX ADMIN — IPRATROPIUM BROMIDE AND ALBUTEROL SULFATE 3 ML: .5; 3 SOLUTION RESPIRATORY (INHALATION) at 08:49

## 2024-11-25 RX ADMIN — Medication 25 MCG: at 08:43

## 2024-11-25 ASSESSMENT — COGNITIVE AND FUNCTIONAL STATUS - GENERAL
TOILETING: 2 - A LOT
DRESSING REGULAR UPPER BODY CLOTHING: 2 - A LOT
HELP NEEDED FOR PERSONAL GROOMING: 3 - A LITTLE
STANDING UP FROM CHAIR USING ARMS: 3 - A LITTLE
MOVING TO AND FROM BED TO CHAIR: 3 - A LITTLE
MOVING TO AND FROM BED TO CHAIR: 2 - A LOT
STANDING UP FROM CHAIR USING ARMS: 3 - A LITTLE
UNDERSTANDING 10 TO 15 MIN SPEECH: 4 - NONE
WALKING IN HOSPITAL ROOM: 3 - A LITTLE
STANDING UP FROM CHAIR USING ARMS: 2 - A LOT
CLIMB 3 TO 5 STEPS WITH RAILING: 2 - A LOT
MOVING TO AND FROM BED TO CHAIR: 3 - A LITTLE
REMEMBERING WHERE THINGS ARE: 4 - NONE
TAKING CARE OF COMPLICATED TASKS: 4 - NONE
WALKING IN HOSPITAL ROOM: 2 - A LOT
DRESSING REGULAR LOWER BODY CLOTHING: 2 - A LOT
FOLLOWS FAMILIAR CONVERSATION: 4 - NONE
REMEMBERING TO TAKE MEDICATION: 4 - NONE
AFFECT: WFL;ANXIOUS;EMOTIONALLY LABILE
AFFECT: WFL
EATING MEALS: 3 - A LITTLE
WALKING IN HOSPITAL ROOM: 3 - A LITTLE
REMEMBERING 5 ERRANDS WITH NO LIST: 4 - NONE
CLIMB 3 TO 5 STEPS WITH RAILING: 3 - A LITTLE
CLIMB 3 TO 5 STEPS WITH RAILING: 2 - A LOT
CLIMB 3 TO 5 STEPS WITH RAILING: 2 - A LOT
MOVING TO AND FROM BED TO CHAIR: 3 - A LITTLE
AFFECT: WFL;ANXIOUS;EMOTIONALLY LABILE
STANDING UP FROM CHAIR USING ARMS: 3 - A LITTLE
WALKING IN HOSPITAL ROOM: 3 - A LITTLE
HELP NEEDED FOR BATHING: 2 - A LOT

## 2024-11-25 NOTE — CONSULTS
Nutrition Assessment    Recommendations  1: Liberalize diet to promote PO intake given underweight status   2: Ensure Plus BID (strawberry/chocolate)   3: MVI + minerals          Clinical Course: Patient is a 64 y.o. female who was admitted on 11/23/2024 with a diagnosis of Pleural effusion on left [J90]  Acute respiratory failure with hypoxia (CMS/HCC) [J96.01].     Past Medical History:   Diagnosis Date    At risk for falls     De Quervain's tenosynovitis     Essential (primary) hypertension     Follicular carcinoma of thyroid (CMS/HCC)     Gastro-esophageal reflux     Hand ulceration (CMS/HCC)     Hyperlipidemia, unspecified     Interstitial lung disease (CMS/HCC)     Leg ulcer (CMS/HCC)     Lung nodule     Lupus     O2 dependent     On 2L    Osteomyelitis of hand (CMS/HCC)     Osteoporosis     Pulmonary hypertension (CMS/HCC)     PVD (peripheral vascular disease) (CMS/HCC)     Raynaud's disease     Severe    Reflux esophagitis     Scleroderma (CMS/HCC)     Screening mammogram for breast cancer 05/04/2021    BI-RADS category: 1 - Negative (care everywhere @ [place])    Vertigo     Wound, open, foot     bilateral,dressing change with medihoney and mupirocin ointment by homecare nurse     Past Surgical History   Procedure Laterality Date    ANGIOGRAM/ANGIOPLASTY FEMORAL , POSS. STENT/COIL/FEM-POP Bilateral 8/14/2024    Performed by Rashel Richards MD [place] at [facility] [place]    Cardiac catheterization      Colonoscopy      Hernia repair      ION Navigational Bronchoscopy with Radial Probe and Fluoroscopy + EBUS N/A 8/8/2024    Performed by Joel Rodriguez MD at [facility] [place]    RIGHT HEART CATH N/A 11/28/2022    Performed by Timothy Arvizu DO at [facility] [place]    Thyroidectomy         Reason for Assessment  Reason For Assessment: physician consult           Nutrition/Diet History  Intake (%): 50%     Physical Findings  Last Bowel Movement: 11/24/24  Skin: pressure injury (R buttocck stage I)         Nutrition Order  Nutrition  Order: meets nutritional requirements  Nutrition Order Comments: Cardiac  Current TF/TPN Regimen: no                 Current Weight  Weight Method: Standing scale  Weight: 49 kg (108 lb)                  Body Mass Index (BMI)  BMI (Calculated): 17.4                       Labs/Procedures/Meds  Lab Results Reviewed: reviewed, pertinent     Lab Results   Component Value Date    GLUCOSE 91 11/25/2024    CALCIUM 9.3 11/25/2024     11/25/2024    K 4.1 11/25/2024    CO2 28 11/25/2024     11/25/2024    BUN 13 11/25/2024    CREATININE 0.8 11/25/2024             Medications  Pertinent Medications Reviewed: reviewed, pertinent   Scheduled Meds:   acetylcysteine  200 mg nebulization BID (8a, 8p)    [Provider Managed Hold] amLODIPine  10 mg oral q AM    atorvastatin  40 mg oral Daily (6p)    buprenorphine  1 patch transdermal q7 days    cholecalciferol (vitamin D3)  25 mcg oral Daily    [Provider Managed Hold] clopidogreL  75 mg oral Daily    enoxaparin  40 mg subcutaneous Daily (6p)    [Provider Managed Hold] ethacrynic acid  25 mg oral BID    ipratropium-albuteroL  3 mL nebulization q6h SPENCER    levothyroxine  100 mcg oral Daily    macitentan  10 mg oral Daily    pantoprazole  40 mg oral Daily    sildenafiL (pulm.hypertension)  20 mg oral TID         Estimated/Assessed Needs  Additional Documentation: Calorie Requirements (Group), Protein Requirements (Group)     Calorie Requirements  Estimated kCal Needs: Actual Body Weight  Estimated Calorie Need Method: kcal/kg  Calorie/kg Recommended: 30-35  Calorie Recommendations: 1078-7668 kcal                   Protein Requirements  Recommended Dosing Weight (Estimated Protein Needs): Actual Body Weight  Est Protein Requirement Amount (gms/kg):  (1.5-2.0 g/kg)  Protein Recommendations:  g                         Fluid requirements 1250 ml (25ml/kg)     Dosing weight 49 kg (ABW)                                                         Clinical comments:    Nutrition  consult acknowledged. Consult for CHF education- deferred given underweight status. Presents with SOB, found to have worsening L pleural effusion. S/p thoracentesis today, declined pleurx for malignant effusion.  Attempted to visit pt at bedside this afternoon, multiple team members in room providing care. Rapid response called this afternoon for chest pain, not appropriate for interview at this time. Pt known to [facility] [place] [facility] from prior admissions, last seen 9/2024. Historical difficulty with appetite. Receptive to Boost/Ensure on prior admissions- will send to aid intakes. Recommend liberalizing diet to promote PO intake given underweight status. Per previous RD notes, pt experienced weight loss earlier this year, and has been making an effort to regain the weight. Weight trended up, with more recent weights trending back down- difficult to accurately assess weight change, suspect weight trends impacted in part by fluid shifting. Dry weight ~109# (8/2024), admission weight 107# via standing scale- weight change not clinically significant for time frame. No additional dry weights per EMR for review. No noted reports of N/V at present. Will obtain detailed hx on f/u as able.     Goals: PO intake >70% meals through f/u   Monitor:  PO intake, diet order, lab values, weight trends, skin integrity, plan of care    Recommendations: See above     PES  Statement: PES Statement  Nutrition Diagnosis: Inadequate Energy Intake  Related To:: Decreased ability to consume sufficient energy  As Evidenced By:: BMI 17.43  Nutritional Needs Met?: No                                   Date: 11/25/24  Signature: BECCA Quinn

## 2024-11-25 NOTE — OR SURGEON
Pre-Procedure patient identification:  I am the primary operating surgeon/proceduralist and I have reviewed the applicable pathology reports and radiology studies for this procedure. I have identified the patient on 11/25/24 at 11:57 AM RAS Montes C

## 2024-11-25 NOTE — ASSESSMENT & PLAN NOTE
The patient is currently normotensive off her usual amlodipine.  Plan is to continue holding amlodipine.

## 2024-11-25 NOTE — PROGRESS NOTES
Palliative Care Progress Note    Patient Name: Arielle Hammondstower  Patient MRN: [id number]  Date / Time: 11/25/2024 1:24 PM   Attending Physician: Kush Castañeda Jr.*    This is Hospital Day: 3    Interval History (Subjective)    Source: chart review, patient and primary medical team    Patient s/p thoracentesis.  RRT underway due to hypertension, tachycardia, dyspnea and hypoxia. Patient briefly placed on NRB and transitioned to Bipap, reporting the bipap pressor is uncomfortable. Daughters on their way to the hospital.     Current Medications:  Current Facility-Administered Medications   Medication Dose Route Frequency Provider Last Rate Last Admin    acetaminophen (TYLENOL) tablet 650 mg  650 mg oral q6h PRN Gerard Quach DO   650 mg at 11/25/24 0439    acetylcysteine (MUCOMYST) 200 mg/mL (20 %) solution 200 mg  200 mg nebulization BID (8a, 8p) Cortez Abbott, DO   200 mg at 11/25/24 0848    [Provider Managed Hold] amLODIPine (NORVASC) tablet 10 mg  10 mg oral q AM Cortez Abbott DO   10 mg at 11/23/24 1344    atorvastatin (LIPITOR) tablet 40 mg  40 mg oral Daily (6p) Cortez Abbott, DO        buprenorphine (BUTRANS) 5 mcg/hour patch 1 patch  1 patch transdermal q7 days Cortez Abbott, DO        cholecalciferol (vitamin D3) tablet 25 mcg  25 mcg oral Daily ScarCortez, DO   25 mcg at 11/25/24 0843    [Provider Managed Hold] clopidogreL (PLAVIX) tablet 75 mg  75 mg oral Daily ScarCortez, DO        glucose chewable tablet 16-32 g of dextrose  16-32 g of dextrose oral PRN Cortez Abbott, DO        Or    dextrose 40 % oral gel 15-30 g of dextrose  15-30 g of dextrose oral PRN Cortez Abbott, DO        Or    glucagon (GLUCAGEN) injection 1 mg  1 mg intramuscular PRN Cortez Abbott, DO        Or    dextrose 50 % in water (D50) injection 12.5 g  25 mL intravenous PRN Cortez Abbott, DO        glucose chewable tablet 16-32 g of dextrose  16-32 g of dextrose oral PRN Cortez Abbott, DO         Or    dextrose 40 % oral gel 15-30 g of dextrose  15-30 g of dextrose oral PRN Cortez Abbott, DO        Or    glucagon (GLUCAGEN) injection 1 mg  1 mg intramuscular PRN Cortez Abbott,         Or    dextrose 50 % in water (D50) injection 12.5 g  25 mL intravenous PRN Cortez Abbott, DO        enoxaparin (LOVENOX) syringe 40 mg  40 mg subcutaneous Daily (6p) Cortez Abbott, DO   40 mg at 11/24/24 1725    [Provider Managed Hold] ethacrynic acid (EDECRIN) tablet 25 mg  25 mg oral BID Cortez Abbott, DO   25 mg at 11/23/24 2049    HYDROmorphone (DILAUDID) injection 0.5 mg  0.5 mg intravenous q2h PRN Kush Castañeda Jr., MD   0.5 mg at 11/25/24 1252    ipratropium-albuteroL (DUO-NEB) 0.5-2.5 mg/3 mL nebulizer solution 3 mL  3 mL nebulization q6h SPENCER Cortez Abbott, DO   3 mL at 11/25/24 1253    labetaloL (NORMODYNE,TRANDATE) injection 10 mg  10 mg intravenous q6h PRN Cortez Abbott, DO   10 mg at 11/25/24 1246    levothyroxine (SYNTHROID) tablet 100 mcg  100 mcg oral Daily Cortez Abbott, DO   100 mcg at 11/25/24 0723    macitentan (OPSUMIT) tablet 10 mg - PT OWN MED  10 mg oral Daily Cortez Abbott, DO   10 mg at 11/25/24 0843    pantoprazole (PROTONIX) tablet,delayed release (DR/EC) 40 mg  40 mg oral Daily Cortez Abbott, DO   40 mg at 11/25/24 0843    sildenafiL (pulm.hypertension) (REVATIO) tablet 20 mg  20 mg oral TID Cortez Abbott, DO   20 mg at 11/25/24 0843    sodium chloride (OCEAN) 0.65 % nasal spray 2 spray  2 spray Each Nostril 3x daily PRN Gerard Quach DO   2 spray at 11/25/24 0434    tiZANidine (ZANAFLEX) tablet 2 mg  2 mg oral q6h PRN Cortez Abbott, DO   2 mg at 11/25/24 0439         Objective    Physical Exam:  General:   Moderate Distress and Cachexia  Eyes:  Sclera Anicteric  ENMT:  Mucus Membranes Dry  CV:  Rate  tachycardic  Lungs:  Respiratory Rate  Tachypneic  Abdomen:  not examined  Psych:  calm, appropriate  Neuro:  Awake, Alert, and Oriented  person, place, and  situation  Skin:  taught, no rash noted on the visible skin    Vitals:  Vitals:    11/25/24 1217   BP: (!) 181/82   Pulse: 98   Resp: (!) 30   Temp:    SpO2: 93%       Intake & Output:  I/O last 3 completed shifts:  In: 1810 [P.O.:1310; IV Piggyback:500]  Out: 600 [Urine:600]    Laboratory Studies:    CBC Results         11/24/24 11/23/24 09/03/24     0349 0908 0503    WBC 7.85 6.61 4.20    RBC 3.62 4.96 3.58    HGB 10.9 14.8 9.5    HCT 35.0 47.1 32.4    MCV 96.7 95.0 90.5    MCH 30.1 29.8 26.5    MCHC 31.1 31.4 29.3     238 290       CMP Results         11/24/24 11/23/24 09/02/24     0349 0908 2245     140 139    K 3.8 5.3 4.2    Cl 108 107 104    CO2 26 23 25    Glucose 92 83 105    BUN 14 14 46    Creatinine 1.1 1.0 1.9    Calcium 8.8 10.0 9.1    Anion Gap 7 10 10    AST -- 30 16    ALT -- 4 11    Albumin -- 4.5 3.6    EGFR 56.2 >60.0 29.4       Troponin I Results         11/23/24 11/23/24 09/03/24     1059 0908 0101    HS Troponin I 13.4 11.9 41.6       ABG Results         11/23/24 04/13/23     0908 1949    Source Of Oxygen nonrebreather nc         Labs no significant abnormalities    Imaging and Other Studies:     Radiology Imaging    XR CHEST 1 VW    Narrative  CLINICAL HISTORY: Chest pain or SOB, pleurisy or effusion suspected    COMMENT: Frontal view of the chest obtained.    COMPARISON: 9/2/2024.    LINES/TUBES: None.    OTHER: Similar mild to moderate pulmonary edema. Left pleural effusion has  increased, though worsening left basilar opacities are also possible. No  definite pneumothorax or large pleural effusion. Stable cardiomediastinal  silhouette.    Impression  IMPRESSION: Please see comment. Recommend follow-up radiographs or chest CT in  6-8 weeks.                                                                      Cardiac Imaging    TRANSTHORACIC ECHO (TTE) LIMITED 07/19/2024    Interpretation Summary  Rhythm is sinus tachycardia.    Mild increased wall thickness with normal left  ventricular cavity and preserved systolic function. EF 65%. No wall motion abnormalities.  Normal right ventricular size with preserved systolic function.  IVC normal in size with >50% respiratory variation consistent with normal right sided filling pressures.  Small pericardial effusion. No evidence of hemodynamic compromise with normal respiratory variation and no chamber collapse. Prominent epicardial fat.  Compared with previous study of 5/23/2024, small pericardial effusion persists.       CXR results/impression reviewed and image independently visualized; no AICD was present (which would  in the setting of comfort care).          Assessment:  Emotional distress  Assessment & Plan  - spiritual care following  - PCSW following   - patient in disbelief with her current medical condition and limited treatment of options.     Cancer associated pain  Assessment & Plan  - continue Butrans 5 mcg/hr patch q7days  -dilaudid 0.5 mg IVP every 2 hours prn added in the setting of chest pain and dyspnea  - continue lidocaine patch  - continue acetaminophen 650 mg q6hr prn  - continue tizanidine 2 mg q6hr prn  - patient hesitant to add other medications, particularly any that could cause sedation/confusion    Palliative care by specialist  Assessment & Plan  64 year old with metastatic lung adenocarcinoma, ILD, scleroderma, and PH presenting with worsening dyspnea.    - goals are life-prolonging, plan for ongoing GOC discussion today given her change in condition post thoracentesis.   - the patient understands she has been told she has a life limiting illness and potential treatment would cause more harm than good. She is known to palliative care and has historically said that she would be willing to undergo any interventions including CPR to prolong life.   - patient is able to make complex medical decisions, two daughters would be surrogate decision makers  - would benefit from home based palliative  services    Plan for family meeting today when her daughters arrive.     Debility  Assessment & Plan  - significant debility in the setting of scleroderma, ILD, and metastatic malignancy, pleural effusion s/p thoracentesis now on Bipap  - lives at home, requires frequent assistance from family  - high risk for further decline            Flower Guallpa, KARYNA  Office [phone]

## 2024-11-25 NOTE — SIGNIFICANT EVENT
Internal Medicine  RRT or Code Blue Note         SUMMARY OF EVENT   This is a 64 y.o. year-old female who was admitted on 11/23/2024 with  Pleural effusion on left [J90]  Acute respiratory failure with hypoxia (CMS/HCC) [J96.01] and is currently being treated for Acute on chronic respiratory failure with hypoxia (CMS/HCC).  I was called to the room by overhead page for RRT for difficulty breathing and chest pain at 1237.  Patient had undergone US guided thoracentesis earlier in the day from which 800mL was removed. Shortly after     Bipap, pulmonology eval re-expansion pulmonary edema. If worsening give 40IV lasix         CODE STATUS   Their Code Status at the start of the event was Full Code     CODE LEADER   Magdy Sandoval MD     PHYSICAL EXAM   Pre-Event Vital Signs: Temp:  [36.4 °C (97.5 °F)-37.2 °C (98.9 °F)] 37.2 °C (98.9 °F)  Heart Rate:  [] 98  Resp:  [20-30] 30  BP: (116-181)/(55-82) 181/82    Physical Exam     SIGNIFICANT LABS / IMAGING   Labs  {LABS REVIEWED SCT:31915}    Imaging  {Imaging reviewed last 24H:92659}    ECG/Telemetry  {Findings; ecg normal:11867}   ASSESSMENT AND PLAN   Diagnosis: Pl    Patient condition at end of event: ***    Disposition:***   KEY COMMUNICATION   Discussed with Attending (Kush Castañeda Jr.*) : Yes/No: yes    Discussed with Emergency Contact: Extended Emergency Contact Information  Primary Emergency Contact: manolomauricio  Mobile Phone: [phone]  Relation: Daughter  Secondary Emergency Contact: Wagner Khan  Mobile Phone: [phone]  Relation: Daughter: {Yes/No:[id number]:::1}    Follow-Up/Handoff: ***           ATTENDING DOCUMENTATION  ALSO SEE ATTENDING ATTESTATION SECTION OF NOTE

## 2024-11-25 NOTE — PROGRESS NOTES
Hospital Medicine     Daily Progress Note       SUBJECTIVE   Interval History: I saw the patient 3 times today.  The first time was at approximately 8:30 AM.  At that time the patient offered no complaints. She denied pain and dyspnea.   At midday, the patient underwent left thoracentesis with removal of 800 mL of fluid.  After the procedure, at approximately 12:30 PM, I was called to the bedside because the patient was complaining of severe chest pain and was noted to be hypertensive, tachycardic, and hypoxemic.  I called an RRT at that time.  The patient was treated with IV labetalol and IV Dilaudid, and was placed on BiPAP.  EKG showed no changes from prior. Chest x-ray appeared unchanged and showed no pneumothorax. Troponins are 11.7 and 14.1. Lactate 0.8. ABG was 7.37/45/145/25 on BiPAP. Her chest pain resolved, and her blood pressure improved.  She was taken off  BiPAP, and her oxygen saturation was 94% on 6 L nasal cannula.  Plan is to wean her FiO2 down with a goal oxygen saturation of 88% per cardiology recommendation.  At approximately 1:30 PM, I had a family meeting with the patient, her two daughters and palliative care.  At that meeting, the patient and her family changed her code status to DNR/DNI.     OBJECTIVE      Vital signs in last 24 hours:  Temp:  [36.4 °C (97.5 °F)-37.3 °C (99.1 °F)] 37.3 °C (99.1 °F)  Heart Rate:  [] 74  Resp:  [20-30] 30  BP: (116-205)/(55-89) 122/58  FiO2 (%) (Set):  [40 %-50 %] 40 %    Intake/Output Summary (Last 24 hours) at 11/25/2024 1705  Last data filed at 11/25/2024 1701  Gross per 24 hour   Intake 1200 ml   Output 1550 ml   Net -350 ml       PHYSICAL EXAMINATION      Physical Exam  Constitutional:       Comments: When I saw the patient this morning she was awake, alert, and conversant, appeared comfortable.  When I saw the patient in the early afternoon, she was in distress due to chest pain, which was relieved with IV hydromorphone.   Pulmonary:       Comments: When I saw the patient during the RRT, lungs had diffuse rhonchi bilaterally.  Musculoskeletal:      Right lower leg: No edema.      Left lower leg: No edema.   Neurological:      Mental Status: Mental status is at baseline.   Psychiatric:      Comments: During the RRT the patient appeared anxious.            LINES, CATHETERS, DRAINS, AIRWAYS, AND WOUNDS   Lines, Drains, and Airways:  Wounds (agree with documentation and present on admission):  Peripheral IV (Adult) 11/23/24 Anterior;Right;Upper Arm (Active)   Number of days: 2       Wound Pressure Injury Right Buttock (Active)   Number of days: 2         Comments:    LABS / IMAGING / TELE      Labs  CBC Results         11/25/24 11/24/24 11/23/24     1252 0349 0908    WBC 6.92 7.85 6.61    RBC 4.03 3.62 4.96    HGB 12.1 10.9 14.8    HCT 39.4 35.0 47.1    MCV 97.8 96.7 95.0    MCH 30.0 30.1 29.8    MCHC 30.7 31.1 31.4     202 238           CMP Results         11/25/24 11/24/24 11/23/24     1252 0349 0908     141 140    K 4.1 3.8 5.3    Cl 107 108 107    CO2 28 26 23    Glucose 91 92 83    BUN 13 14 14    Creatinine 0.8 1.1 1.0    Calcium 9.3 8.8 10.0    Anion Gap 6 7 10    AST 13 -- 30    ALT 5 -- 4    Albumin 3.6 -- 4.5    EGFR >60.0 56.2 >60.0           Comment for NA at 0908 on 11/23/24: Moderate hemolysis, results may be affected.     Comment for K at 0908 on 11/23/24: Results obtained on plasma. Plasma Potassium values may be up to 0.4 mEQ/L less than serum values. The differences may be greater for patients with high platelet or white cell counts.  Moderate hemolysis, results may be affected.     Comment for AST at 0908 on 11/23/24: Moderate hemolysis, results may be affected.     Comment for EGFR at 1252 on 11/25/24: Calculation based on the Chronic Kidney Disease Epidemiology Collaboration (CKD-EPI) equation refit without adjustment for race.    Comment for EGFR at 0349 on 11/24/24: Calculation based on the Chronic Kidney Disease  "Epidemiology Collaboration (CKD-EPI) equation refit without adjustment for race.    Comment for EGFR at 0908 on 11/23/24: Calculation based on the Chronic Kidney Disease Epidemiology Collaboration (CKD-EPI) equation refit without adjustment for race.        ABG showed pH 7.37, pCO2 45, pO2 145, bicarb 25 on BiPAP.  Venous lactate 0.8.  Troponin 11.7, 14.1.    Imaging  Chest x-ray shows, among other things, as follows:  \"LUNGS AND PLEURA: Redemonstrated dense consolidation in the left lower lobe.  Patchy airspace opacities throughout the bilateral lungs on a background of  interstitial edema. Small-moderate left pleural effusion. No pneumothorax.  Overall, findings not significantly changed from prior study.\"    ECG/Telemetry  EKG shows no significant change from prior.    ASSESSMENT AND PLAN        Assessment & Plan  Acute on chronic respiratory failure with hypoxia (CMS/HCC)  Pulmonology and cardiology consultations appreciated.  Patient with lung cancer with malignant pleural effusion, underlying pulmonary hypertension, interstitial lung disease and HFpEF. CT Chest showed evidence of worsening L pleural effusion, mucous plugging and debris concerning for aspiration.  She underwent left thoracentesis today with removal of 800 mL of fluid.  After the procedure she developed chest pain, hypertension, tachycardia, and worsening hypoxemia, as described above.  Her symptoms resolved after the interventions described above.  Plan is to continue acetylcysteine nebs, ipratropium-albuterol nebs, buprenorphine patch, acetaminophen as needed, tizanidine as needed, IV hydromorphone as needed.  Restart ethacrynic acid.  Of note, patient is allergic to morphine and most diuretics.  She does tolerate hydromorphone.  Taper FiO2 with a target oxygen saturation of 88% as recommended by cardiology.  The patient's CODE STATUS was changed to DNR/DNI today and a family meeting with her two daughters.  Malignant pleural effusion  Status " post left thoracentesis today.  See discussion under heading of acute on chronic respiratory failure.  Adenocarcinoma, lung (CMS/HCC)  This was diagnosed at the [facility] by pleural fluid cytology.  The patient was evaluated at the [facility] was advised that they could offer no cancer directed therapy.  Interstitial lung disease (CMS/HCC)  Failed mycophenolate outpatient per notes.  Pulmonary hypertension (CMS/HCC)  Cardiology consultation appreciated.  Continue sildenafil/macitentan.  Chronic heart failure with preserved ejection fraction (CMS/HCC)  Cardiology consultation appreciated.  Continue ethacrynic acid.  Hypothyroid  Continue levothyroxine.  Scleroderma (CMS/HCC)  Noted.  Peripheral arterial disease (CMS/HCC)  Continue atorvastatin.  Restart clopidogrel, which was held for her procedure today.  GERD (gastroesophageal reflux disease)  Patient reported GERD symptoms today.  Plan is to continue pantoprazole and start sucralfate.  Essential (primary) hypertension  The patient is currently normotensive off her usual amlodipine.  Plan is to continue holding amlodipine.    VTE Assessment: Padua    VTE Prophylaxis:  Current anticoagulants:  enoxaparin (LOVENOX) syringe 40 mg, subcutaneous, Daily (6p)      Code Status: DNR (A.N.D.)      Estimated Discharge Date: 11/29/2024   Disposition Planning: To home when medically stable.     Kush Castañeda Jr, MD  11/25/2024

## 2024-11-25 NOTE — PLAN OF CARE
Problem: Adult Inpatient Plan of Care  Goal: Plan of Care Review  Outcome: Progressing  Flowsheets (Taken 11/25/2024 9742)  Progress: improving  Outcome Evaluation: PT IE completed. Pt appears at functional baseline, has 24hr care at home. Anticipate return to previous living situation with assist at discharge.  Plan of Care Reviewed With: patient     Problem: Mobility Impairment  Goal: Optimal Mobility  Outcome: Progressing

## 2024-11-25 NOTE — PLAN OF CARE
Plan of Care Review  Plan of Care Reviewed With: patient  Progress: no change  Outcome Evaluation: Patient currently resting comfortably on 4L NC. Significant shortness of breath with low oxygen saturation noted when moving between bed and commode. Patient has expressed left chest discomfort as well as anxiety and sadness over medical situation.

## 2024-11-25 NOTE — PROGRESS NOTES
Patient:  Arielle GUAJARDO Isidro  Location:  [facility] 2010  MRN:  [id number]  Today's date:  11/25/2024    SLP Diagnosis  Grossly functional oropharyngeal swallow    Swallowing Recommendations   SLP Swallowing Recommendations - 11/25/24 1014       Diet Consistency Recommendations thin liquids;regular     Medication Administration whole with liquid     Supervision Level for Intake patient independent     Posture Recommendations fully upright in chair/chair mode of bed     Oral Hygiene twice daily teeth brushing     Comment, Swallowing Recommendations SLP to sign off at this time as acute dysphagia services are no longer indicated. Please reconsult if new concerns arise.                     Summary/Handoff  Dysphagia follow-up completed at the bedside.  Patient remains with fairly functional oropharyngeal swallow.  Denies any swallowing difficulties.  Patient recalls SLP evaluation from yesterday, recalling prophylactic swallow precautions reviewed and understanding of risk for aspiration.  Patient again seen with all textures this date, demonstrating fairly functional performance without overt signs/symptoms of aspiration or concerns for significant dysphagia.  Per pulmonology and bedside SLP assessments, suspect low suspicion of aspiration.  Would continue current diet level with recommendations as highlighted above.  SLP to sign off at this time as acute dysphagia services are no longer indicated.  Please reconsult if new concerns arise.    Active Diet Orders (From admission, onward)       Start        11/23/24 1606  Adult Diet Regular; Cardiac (Low Sodium/Low Fat); RD/LDN may adjust order  Diet effective now        Question Answer Comment   Diet Texture Regular    Other Restriction(s): Cardiac (Low Sodium/Low Fat)    Delegation of Authority. Diet orders written by PA/Rosenda may not be adjusted by RD/LDNs. RD/LDN may adjust order                           Arielle is a 64 y.o. female  "admitted on 11/23/2024 with Pleural effusion on left [J90]  Acute respiratory failure with hypoxia (CMS/HCC) [J96.01]. Principal problem is Acute on chronic respiratory failure with hypoxia (CMS/HCC).    Past Medical History  Arielle has a past medical history of At risk for falls, De Quervain's tenosynovitis, Essential (primary) hypertension, Follicular carcinoma of thyroid (CMS/HCC), Gastro-esophageal reflux, Hand ulceration (CMS/HCC), Hyperlipidemia, unspecified, Interstitial lung disease (CMS/HCC), Leg ulcer (CMS/HCC), Lung nodule, Lupus, O2 dependent, Osteomyelitis of hand (CMS/HCC), Osteoporosis, Pulmonary hypertension (CMS/HCC), PVD (peripheral vascular disease) (CMS/HCC), Raynaud's disease, Reflux esophagitis, Scleroderma (CMS/HCC), Screening mammogram for breast cancer (05/04/2021), Vertigo, and Wound, open, foot.    History of Present Illness  64 year old with metastatic lung adenocarcinoma, ILD, scleroderma, and PH presenting with worsening dyspnea.      Per Pulmonology, \"Patient would benefit from therapeutic thoracentesis with interventional radiology.  Given that her pleural effusion reoccurred 7 weeks after her last thora, and it is malignant, she would be a candidate for a Pleurx catheter placement. However, on discussion today, she states that she doesn't want a Pleurx and would prefer thoracentesis as needed when the fluid reaccumulates\"    Per prior therapy notes \"8/9/24 podiatry consulted for  necrosis of the left second toe along with other chronic BLE wounds. WBAT to BLE\"    SLP Pain      Date/Time Pain Type Rating: Rest Rating: Activity [facility]   11/25/24 1014 Pain Assessment 0 - no pain 0 - no pain [facility]            Prior Level of Function      Flowsheet Row Most Recent Value   Dominant Hand right   Ambulation assistive person   Transferring assistive person   Toileting assistive person   Bathing assistive equipment and person   Dressing assistive person   Eating independent   IADLs assistive " person   Driving/Transportation friends/family   Communication understands/communicates without difficulty   Swallowing swallows foods/liquids without difficulty   Baseline Diet/Method of Nutritional Intake no diet restrictions   Past History of Dysphagia No prior hx of dysphagia per pt chart or pt report, pt denies any swallowing difficulty with intake   Prior Level of Function Comment Person to assist for ambulation.           Prior Living Environment      Flowsheet Row Most Recent Value   People in Home alone, other (see comments)  [has 24 hour caregivers]   Living Environment Comment [facility] with 5-6 LARRY (needs person for assist),  stair glide to 2nd floor with tub/shower set up.             SLP Evaluation and Treatment - 11/25/24 1014          SLP Time Calculation    Start Time 1014     Stop Time 1024     Time Calculation (min) 10 min     Document Type Daily Treatment/Progress Note        General Information    Position at Start of Session upright;in bed     Status at Start of Session no issues or concerns identified by nurse prior to session;agreeable to therapy        Precautions/Limitations/Impairments    Existing Precautions/Restrictions aspiration;fall        Cognition/Psychosocial    Affect/Mental Status WFL     Orientation Status oriented x 4     Follows Commands WFL        Dentition (Oral Motor)    Dentition (Oral Motor) natural dentition        Facial Symmetry (Oral Motor)    Facial Symmetry (Oral Motor) WNL        Lip Function (Oral Motor)    Lip Range of Motion (Oral Motor) WNL     Lip Strength (Oral Motor) WNL     Lip Sensitivity (Oral Motor) intact        Tongue Function (Oral Motor)    Tongue ROM (Oral Motor) WNL     Tongue Strength (Oral Motor) WNL     Tongue Coordination/Speed (Oral Motor) WNL     Tongue Sensitivity (Oral Motor) intact        Jaw Function (Oral Motor)    Jaw Function (Oral Motor) WNL        Cough/Swallow/Gag Reflex (Oral Motor)    Volitional Throat Clear/Cough (Oral Motor) WNL      Volitional Swallow (Oral Motor) WNL        Vocal Quality/Secretion Management (Oral Motor)    Vocal Quality (Oral Motor) WNL     Secretion Management (Oral Motor) WNL        GRBAS    Grade 0-->none     Roughness 0-->none     Breathiness 0-->none     Asthenia 0-->none     Strain 0-->none        Functional Communication Measures    FCM: Swallowing 7-->Level 7        General Swallowing Observations    Current Diet/Method of Nutritional Intake thin liquids;regular     Signs/Symptoms of Aspiration (Current Diet) none     Respiratory Support (General Swallowing Observations) none        Food and Liquid Trials (NIS)    Patient Positioning HOB elevated (specify degrees)     Oral Intake/Feeding Performance independent/appropriate self-feeding skills     Liquid Consistencies Evaluated thin liquids     Thin Liquids intact;sips from cup;straw sips     Comment, Thin Liquids Adequate oral access and containment, timely and effective lingual manipulation with AP transfers, no oral residuals, suspect timely swallow initiation w/ no overt s/sx of aspiration appreciated. 3oz water protocol passed     Food Consistencies Evaluated pureed (PU4);regular     Pureed (PU4) intact;use of teaspoon/fork     Comment, Pureed (PU4) adequate oral access and containment, timely lingual manipulation and motion with functional AP transfers, adequate oral clearance, no c/o pharyngeal retentition     Regular intact;patient controlled amounts     Comment, Regular timely and effective mastication w/ adequate bolus breakdown achieved, no significant oral residuals     Pharyngeal Phase of Swallow no clinical symptoms     Esophageal Phase of Swallow no clinical symptoms        Swallowing Intervention    Dysphagia/Swallowing Interventions --        AM-PAC™ - Cognition (Current Function)    Following/understanding a 10-15 minute speech or presentation? 4 - None     Understanding familiar people during ordinary conversations? 4 - None     Remembering to take  medications at the appropriate time? 4 - None     Remembering where things were placed or put away? 4 - None     Remembering a list of 3 or 4 errands without writing it down? 4 - None     Taking care of complicated tasks? 4 - None     AM-PAC™ Cognition Score 24        Session Outcome    Position at End of Session upright;in bed     Status at End of Session all needs met;call light in reach;personal items in reach     Nursing Notified patient's response to therapy/activity;patient's position;patient's performance                                 SLP Goals      Flowsheet Row Most Recent Value   Oral Nutrition/Hydration Goal 1    Activity effective/safe/independent at 11/24/2024 1041   Time Frame short-term goal (STG) at 11/24/2024 1041

## 2024-11-25 NOTE — PROGRESS NOTES
Palliative Care Social Work  Progress Note    Arielle Felix       Summary of visit: PCSW visited with patient bedside, offered supportive presence and validation, patient very tearful - on her way to IR. PCSW will follow later today for supportive intervention related to acute hospitalization and complex illness.     Recommendation/Follow up plan:   - ongoing supportive counseling     EVANGELINA Lino   11/25/2024

## 2024-11-25 NOTE — ASSESSMENT & PLAN NOTE
This was diagnosed at the [facility] by pleural fluid cytology.  The patient was evaluated at the [facility] was advised that they could offer no cancer directed therapy.

## 2024-11-25 NOTE — ASSESSMENT & PLAN NOTE
Pulmonology and cardiology consultations appreciated.  Patient with lung cancer with malignant pleural effusion, underlying pulmonary hypertension, interstitial lung disease and HFpEF. CT Chest showed evidence of worsening L pleural effusion, mucous plugging and debris concerning for aspiration.  She underwent left thoracentesis today with removal of 800 mL of fluid.  After the procedure she developed chest pain, hypertension, tachycardia, and worsening hypoxemia, as described above.  Her symptoms resolved after the interventions described above.  Plan is to continue acetylcysteine nebs, ipratropium-albuterol nebs, buprenorphine patch, acetaminophen as needed, tizanidine as needed, IV hydromorphone as needed.  Restart ethacrynic acid.  Of note, patient is allergic to morphine and most diuretics.  She does tolerate hydromorphone.  Taper FiO2 with a target oxygen saturation of 88% as recommended by cardiology.  The patient's CODE STATUS was changed to DNR/DNI today and a family meeting with her two daughters.

## 2024-11-25 NOTE — PROGRESS NOTES
Pulmonary  Progress Note       SUBJECTIVE     No acute events overnight reported. Pt seen and examined at bedside. Pt complains of central CP. Pt complains of SOB.    On re-eval during RRT, pt developed significant chest pain, tachypnea and tachycardia after thoracentesis. Pt received dilaudid and placed on BIPAP and appeared more comfortable. Post CXR was negative for ptx.     OBJECTIVE     Vital Signs:     Temp (24hrs), Av.8 °C (98.2 °F), Min:36.4 °C (97.5 °F), Max:37.2 °C (98.9 °F)      Visit Vitals  BP (!) 147/66 (BP Location: Left upper arm, Patient Position: Lying)   Pulse 80   Temp 37.2 °C (98.9 °F) (Temporal)   Resp (!) 30   Wt 49 kg (108 lb)   LMP  (LMP Unknown)   SpO2 97%   BMI 17.43 kg/m²     Vitals:    24 1248 24 1250 24 1255 24 1302   BP: (!) 175/79 (!) 150/70 (!) 125/59 (!) 147/66   BP Location:    Left upper arm   Patient Position:    Lying   Pulse: (!) 112 (!) 102 84 80   Resp:       Temp:       TempSrc:       SpO2: 95% 98% 96% 97%   Weight:             I/O's:    Intake/Output Summary (Last 24 hours) at 2024 1433  Last data filed at 2024 1211  Gross per 24 hour   Intake 720 ml   Output 1550 ml   Net -830 ml         Medications:     acetylcysteine  200 mg nebulization BID (8a, 8p)    [Provider Managed Hold] amLODIPine  10 mg oral q AM    atorvastatin  40 mg oral Daily (6p)    buprenorphine  1 patch transdermal q7 days    cholecalciferol (vitamin D3)  25 mcg oral Daily    [Provider Managed Hold] clopidogreL  75 mg oral Daily    enoxaparin  40 mg subcutaneous Daily (6p)    [Provider Managed Hold] ethacrynic acid  25 mg oral BID    ipratropium-albuteroL  3 mL nebulization q6h SPENCER    levothyroxine  100 mcg oral Daily    macitentan  10 mg oral Daily    ondansetron  4 mg intravenous Once    pantoprazole  40 mg oral Daily    sildenafiL (pulm.hypertension)  20 mg oral TID    sucralfate  1 g oral BID AC              Physical Exam     Physical Exam:   General: NAD,  resting comfortably in bed  Cardiac: RRR, no murmurs, rubs and gallops  Respiratory: Decreased breathe sounds on L  Abdominal: soft, NT  MSK: No LLE erythema, swelling or edema  Neuro: AAOx3  Skin: Scleroderma of skin and 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 Labs of note discussed below:    CBC Results         11/25/24 11/24/24 11/23/24     1252 0349 0908    WBC 6.92 7.85 6.61    RBC 4.03 3.62 4.96    HGB 12.1 10.9 14.8    HCT 39.4 35.0 47.1    MCV 97.8 96.7 95.0    MCH 30.0 30.1 29.8    MCHC 30.7 31.1 31.4     202 238          BMP Results         11/25/24 11/24/24 11/23/24     1252 0349 0908     141 140    K 4.1 3.8 5.3    Cl 107 108 107    CO2 28 26 23    Glucose 91 92 83    BUN 13 14 14    Creatinine 0.8 1.1 1.0    Calcium 9.3 8.8 10.0    Anion Gap 6 7 10    EGFR >60.0 56.2 >60.0           Comment for NA at 0908 on 11/23/24: Moderate hemolysis, results may be affected.     Comment for K at 0908 on 11/23/24: Results obtained on plasma. Plasma Potassium values may be up to 0.4 mEQ/L less than serum values. The differences may be greater for patients with high platelet or white cell counts.  Moderate hemolysis, results may be affected.     Comment for EGFR at 1252 on 11/25/24: Calculation based on the Chronic Kidney Disease Epidemiology Collaboration (CKD-EPI) equation refit without adjustment for race.    Comment for EGFR at 0349 on 11/24/24: Calculation based on the Chronic Kidney Disease Epidemiology Collaboration (CKD-EPI) equation refit without adjustment for race.    Comment for EGFR at 0908 on 11/23/24: Calculation based on the Chronic Kidney Disease Epidemiology Collaboration (CKD-EPI) equation refit without adjustment for race.          Microbiology Results       Procedure Component Value Units Date/Time    MRSA Screen, Nares Only Nose [[id number]]  (Normal) Collected: 11/23/24 8274     Specimen: Nasal Swab from Nose Updated: 11/23/24 1707     MRSA DNA, Nares Negative    SARS-COV-2 (COVID-19)/ FLU A/B, AND RSV, PCR Nasopharynx [[id number]]  (Normal) Collected: 11/23/24 0909    Specimen: Nasopharyngeal Swab from Nasopharynx Updated: 11/23/24 1042     SARS-CoV-2 (COVID-19) Negative     Influenza A Negative     Influenza B Negative     Respiratory Syncytial Virus Negative    Narrative:      Testing performed using real-time PCR for detection of COVID-19. EUA approved validation studies performed on site.     Blood Culture Blood, Venous [[id number]]  (Normal) Collected: 11/23/24 0908    Specimen: Blood, Venous Updated: 11/25/24 1100     Culture No growth at 48 hours    Blood Culture Blood, Venous [[id number]]  (Normal) Collected: 11/23/24 0908    Specimen: Blood, Venous Updated: 11/25/24 1100     Culture No growth at 48 hours          ABG Results         11/25/24 11/23/24 04/13/23     1311 0908 1949    pH 7.37 -- --    pCO2 45 -- --    pO2 145 -- --    HCO3 25 -- --    Base Excess 0.4 -- --    Source Of Oxygen -- nonrebreather nc           Comment for pH at 1311 on 11/25/24: Temperature Corrected pH(T)    Comment for pCO2 at 1311 on 11/25/24: Temperature Corrected pCO2(T)    Comment for pO2 at 1311 on 11/25/24: Temperature Corrected pO2(T)              Imaging:    I have reviewed all pertinent imaging which is discussed below.    No results found.       ASSESSMENT AND PLAN     In brief, Arielle Felix is a 64 y.o. female past medical history of Group I pulmonary hypertension secondary to systemic sclerosis, ILD, stage IV lung adenocarcinoma who presented to the ED for worsening shortness of breath.     Impression:  Acute hypoxemic respiratory failure 2/2 recurrent malignant pleural effusion  Stage IV lung adenocarcinoma  ILD 2/2 scleroderma, not in acute exacerbation  Group 1 PH      Recommendations:  - S/p therapeutic thoracentesis complicated by chest pain, likely related to procedure and will  improve, can give pain meds as needed  - Pt declines Pleurx at this time due to infection risk  - Agree with GOC, as per oncologist, no treatment options for stage IV lung adenocarcinoma  - Rest of management per primary team. Note is not complete until attending attestation is complete.         [facility] [place]  Pulmonary & Critical Care Fellow, PGY-4  11/25/2024  2:33 PM  Pager: [phone]

## 2024-11-25 NOTE — NURSING NOTE
"Report received from IR s/p thoracentesis. Pt received on stretcher saying she feels like she \"is having a heart attack\" and \"someone is sitting on my chest\". Pulse 110, /84. Pt placed on nonrebreather and EKG obtained.  Attending paged and saw pt at bedside.   Rapid response called 1239.   Pt placed on BIPAP.   1247 1 mg IV labetolol given. 1248 /79.  1255 /59.  1252 0.5 mg Dilaudid given.  1255 End Rapid   Pt pain remains 10/10. Pt calm and working on breathing to \"calm myself down\".   1341 Daughter at bedside   "

## 2024-11-25 NOTE — PROGRESS NOTES
Supported Patient's family at Advance Care Planning meeting. Family very understanding of pt's medical condition. Family relies on each for support. Offered compassionate presence, reflective listening, and prayer. Sc will remain available for spiritual, and emotional support.    Rev.Dr.Peter Luna, Spiritual Care Specialist.   [phone]

## 2024-11-25 NOTE — PROGRESS NOTES
"     HF/PH Cardiology   Progress Note     Assessment/Plan   ASSESSMENT AND RECOMMENDATIONS      Pulmonary Hypertension (WHO Group I, NYHA/WHO FC III)   Systemic sclerosis, Raynaud's Disease, interstitial lung disease  Hypertension  Nonobstructive coronary artery disease  Concern for aspiration, awaiting SLP consult    Planned for left thoracentesis today. Agree with consideration for Pleur-x catheter given that she is not being offered treatment for her Lung AdenoCA and will have near certain likelihood of recurrence: She has declined Pleur-x at this time.   Please wean oxygen to the lowest flow necessary to maintain a saturation of 88%  Continue uninterrupted Sildenafil 20 mg TID and Macitentan 10 mg daily  Clopidogrel can be held as needed. Would resume when possible.        Subjective     SUBJECTIVE     Interval History: Seen and examined sitting up in bed with sister at bedside. She reports that her breathing feels \"tight\" today. She reports chest / upper abdominal pressure this AM. She currently denies light-headedness.     Consult Background from 11/23:   Arielle Felix is a 64 y.o. female with a PMHx as below presenting in the setting of worsening dyspnea and found to have worsening left pleural effusion.  This was known by examination and a Tuesday office visit with Dr. Arvizu, the patient did not want to miss her oncology appointment in subsequent appointments, so she chose to not have that addressed at that time.  Unfortunately she became acutely more dyspneic last night, which prompted her to come to the ER.    She is tachycardic and on oxygen.  Imaging is consistent with a large left pleural effusion.        Objective   OBJECTIVE     Vitals and Physical Exam    Visit Vitals  /64 (BP Location: Left upper arm, Patient Position: Lying)   Pulse 74   Temp 36.4 °C (97.5 °F) (Temporal)   Resp 20   Wt 49 kg (108 lb)   LMP  (LMP Unknown)   SpO2 96%   BMI 17.43 kg/m²     BP Readings from Last 5 " Encounters:   11/25/24 132/64   11/19/24 120/72   10/03/24 128/76   09/03/24 120/79   08/05/24 (!) 148/75       Intake/Output Summary (Last 24 hours) at 11/25/2024 0913  Last data filed at 11/25/2024 0415  Gross per 24 hour   Intake 1720 ml   Output 600 ml   Net 1120 ml     I/O last 3 completed shifts:  In: 1810 [P.O.:1310; IV Piggyback:500]  Out: 600 [Urine:600]  Weights (last 7 days)       Date/Time Weight    11/25/24 0434 49 kg (108 lb)    11/24/24 0347 48.7 kg (107 lb 6.4 oz)           Body mass index is 17.43 kg/m².  Body surface area is 1.51 meters squared.    Physical Exam  Neck:      Vascular: No carotid bruit or JVD.   Cardiovascular:      Rate and Rhythm: Regular rhythm. Tachycardia present.      Pulses: Normal pulses.      Heart sounds: Murmur (1/6 CANDIS) heard.   Pulmonary:      Effort: Pulmonary effort is normal. No respiratory distress.      Breath sounds: Normal breath sounds.   Musculoskeletal:      Right lower leg: No edema.      Left lower leg: No edema.      Comments: Bilateral tip of the fingers erosion   Skin:     General: Skin is warm and dry.      Comments: Gauze wrapped around ankle.   Thick facial and limb skin.   Neurological:      General: No focal deficit present.      Mental Status: She is alert and oriented to person, place, and time.         Diagnostic Data    Imaging  CT CHEST WITHOUT IV CONTRAST    Result Date: 11/23/2024  IMPRESSION: 1. Worsening mucous plugging/aspiration in the left lung with complete collapse left lower lobe and increasing subsegmental atelectasis left upper lobe superimposed upon mild background CHF and pulmonary emphysema/fibrosis. At least partially loculated moderate left pleural effusion is slightly larger, potentially malignant given increasing areas of peripheral pleural parenchymal nodularity, though this is indeterminate in the setting of additional airspace opacity suspicious for aspiration pneumonia (asymmetric increased interlobular septal  thickening/edema changes in the left lung may potentially also represent lymphangitic spread of neoplasm). Recommend close clinical and imaging follow-up; ultimately, repeat PET/CT is likely necessary. 2. Incompletely evaluated renal hyperdensities, for which nonemergent outpatient enhanced abdominal MRI is advised.    X-RAY CHEST 1 VIEW    Result Date: 2024  IMPRESSION: Please see comment. Recommend follow-up radiographs or chest CT in 6-8 weeks.      --    History    Past Medical History    Past Medical History:   Diagnosis Date    At risk for falls     De Quervain's tenosynovitis     Essential (primary) hypertension     Follicular carcinoma of thyroid (CMS/HCC)     Gastro-esophageal reflux     Hand ulceration (CMS/HCC)     Hyperlipidemia, unspecified     Interstitial lung disease (CMS/HCC)     Leg ulcer (CMS/HCC)     Lung nodule     Lupus     O2 dependent     On 2L    Osteomyelitis of hand (CMS/HCC)     Osteoporosis     Pulmonary hypertension (CMS/HCC)     PVD (peripheral vascular disease) (CMS/HCC)     Raynaud's disease     Severe    Reflux esophagitis     Scleroderma (CMS/HCC)     Screening mammogram for breast cancer 2021    BI-RADS category: 1 - Negative (care everywhere @ [facility])    Vertigo     Wound, open, foot     bilateral,dressing change with medihoney and mupirocin ointment by homecare nurse       Family History    Family History   Problem Relation Name Age of Onset    Heart disease Biological Brother          stent    Breast cancer Niece      Cervical cancer Neg Hx      Uterine cancer Neg Hx      Colon cancer Neg Hx      Ovarian cancer Neg Hx         Social History    Social History     Socioeconomic History    Marital status:    Tobacco Use    Smoking status: Former     Current packs/day: 0.00     Types: Cigarettes     Quit date: 9/15/2005     Years since quittin.2    Smokeless tobacco: Never    Tobacco comments:     Would smoke 1/2 of 1/2 PPD, quit in    Vaping Use     Vaping status: Never Used   Substance and Sexual Activity    Alcohol use: Never    Drug use: Never    Sexual activity: Not Currently     Birth control/protection: Post-menopausal     Social Drivers of Health     Food Insecurity: No Food Insecurity (11/23/2024)    Hunger Vital Sign     Worried About Running Out of Food in the Last Year: Never true     Ran Out of Food in the Last Year: Never true   Transportation Needs: No Transportation Needs (8/9/2024)    PRAPARE - Transportation     Lack of Transportation (Medical): No     Lack of Transportation (Non-Medical): No   Housing Stability: Low Risk  (8/9/2024)    Housing Stability Vital Sign     Unable to Pay for Housing in the Last Year: No     Number of Places Lived in the Last Year: 1     Unstable Housing in the Last Year: No       Allergies  Aspirin, Ibuprofen, Iodine, Iodine and iodide containing products, Morphine, Penicillins, Sulfa (sulfonamide antibiotics), Clindamycin, Hydrochlorothiazide, Oxycodone, Sulfamethoxazole-trimethoprim, and Latex    Medications    Inpatient  Current Facility-Administered Medications   Medication Dose Route Frequency    acetaminophen  650 mg oral q6h PRN    acetylcysteine  200 mg nebulization BID (8a, 8p)    [Provider Managed Hold] amLODIPine  10 mg oral q AM    atorvastatin  40 mg oral Daily (6p)    buprenorphine  1 patch transdermal q7 days    cholecalciferol (vitamin D3)  25 mcg oral Daily    [Provider Managed Hold] clopidogreL  75 mg oral Daily    glucose  16-32 g of dextrose oral PRN    Or    dextrose  15-30 g of dextrose oral PRN    Or    glucagon  1 mg intramuscular PRN    Or    dextrose 50 % in water (D50)  25 mL intravenous PRN    glucose  16-32 g of dextrose oral PRN    Or    dextrose  15-30 g of dextrose oral PRN    Or    glucagon  1 mg intramuscular PRN    Or    dextrose 50 % in water (D50)  25 mL intravenous PRN    enoxaparin  40 mg subcutaneous Daily (6p)    [Provider Managed Hold] ethacrynic acid  25 mg oral BID     ipratropium-albuteroL  3 mL nebulization q6h SPENCER    labetalol  10 mg intravenous q6h PRN    levothyroxine  100 mcg oral Daily    macitentan  10 mg oral Daily    pantoprazole  40 mg oral Daily    sildenafiL (pulm.hypertension)  20 mg oral TID    sodium chloride  2 spray Each Nostril 3x daily PRN    tiZANidine  2 mg oral q6h PRN       Home   No current facility-administered medications on file prior to encounter.     Current Outpatient Medications on File Prior to Encounter   Medication Sig Dispense Refill    amLODIPine (NORVASC) 5 mg tablet Take 10 mg by mouth every morning.      atorvastatin (LIPITOR) 40 mg tablet Take 1 tablet (40 mg total) by mouth daily. 90 tablet 3    benzonatate (TESSALON) 100 mg capsule Take 100 mg by mouth 3 (three) times a day as needed for cough.      biotin 1 mg tablet Take 1,000 mcg by mouth daily.      buprenorphine (BUTRANS) 5 mcg/hour patch weekly Place 1 patch on the skin every (seven) 7 days.      cholecalciferol, vitamin D3, 1,000 unit (25 mcg) tablet Take 1,000 Units by mouth daily.      clopidogreL (PLAVIX) 75 mg tablet Take 1 tablet (75 mg total) by mouth daily. 90 tablet 3    collagenase (SantyL) ointment Apply 1 Application topically daily.      cyanocobalamin, vitamin B-12, 1,000 mcg capsule Take 1,000 mcg by mouth daily.      cyclobenzaprine (FLEXERIL) 5 mg tablet Take 1 tablet (5 mg total) by mouth 3 (three) times a day as needed for muscle spasms (/ chest pain). 90 tablet 0    docusate sodium (COLACE) 100 mg capsule Take 100 mg by mouth daily as needed for constipation.      gabapentin (NEURONTIN) 100 mg capsule Take 2 capsules (200 mg total) by mouth 3 (three) times a day. 180 capsule 0    honey (MEDIHONEY) 100 % paste Apply topically daily. 15 mL 0    hydrocortisone 1 % cream Apply to affected area 2 times daily 15 g 0    levothyroxine (SYNTHROID) 100 mcg tablet Take 100 mcg by mouth daily. BRAND ONLY      lidocaine (ASPERCREME) 4 % adhesive patch,medicated topical patch  Apply 1 patch topically daily as needed (pain). Remove & discard patch within 12 hours or as directed by prescriber. 30 patch 0    loperamide (IMODIUM) 2 mg capsule Take 2 mg by mouth every 8 (eight) hours as needed for diarrhea.      meclizine (ANTIVERT) 25 mg tablet Take 25 mg by mouth daily as needed.      MEDIHONEY, HONEY, TOP Apply topically daily.      mupirocin (BACTROBAN) 2 % ointment Apply 1 application. topically daily. Behind ears      ondansetron (ZOFRAN) 4 mg tablet Take 1 tablet (4 mg total) by mouth every 8 (eight) hours as needed for nausea or vomiting for up to 7 days. 21 tablet 0    OPSUMIT 10 mg tablet tablet Take 1 tablet (10 mg total) by mouth daily. 30 tablet 0    pantoprazole (PROTONIX) 40 mg EC tablet Take 1 tablet (40 mg total) by mouth daily Indications: gastroesophageal reflux disease. 30 tablet 0    polyethylene glycol (MIRALAX) 17 gram packet Take 17 g by mouth 2 (two) times a day as needed (constipation) for up to 5 days. 10 each 0    sildenafiL, pulm.hypertension, (REVATIO) 20 mg tablet Take 1 tablet (20 mg total) by mouth 3 (three) times a day. 270 tablet 3    sodium chloride (OCEAN) 0.65 % nasal spray Administer 2 sprays into each nostril daily.      tiZANidine (ZANAFLEX) 2 mg tablet Take 2 mg by mouth every 6 (six) hours as needed for muscle spasms.      tocilizumab (ACTEMRA) 200 mg/10 mL (20 mg/mL) solution Infuse 8 mg/kg into a venous catheter every 28 (twentyeight) days.

## 2024-11-25 NOTE — HOSPITAL COURSE
"Arielle is a 64 y.o. female admitted on 11/23/2024 with Pleural effusion on left [J90]  Acute respiratory failure with hypoxia (CMS/HCC) [J96.01]. Principal problem is Acute on chronic respiratory failure with hypoxia (CMS/HCC).    Past Medical History  Arielle has a past medical history of At risk for falls, De Quervain's tenosynovitis, Essential (primary) hypertension, Follicular carcinoma of thyroid (CMS/HCC), Gastro-esophageal reflux, Hand ulceration (CMS/HCC), Hyperlipidemia, unspecified, Interstitial lung disease (CMS/HCC), Leg ulcer (CMS/HCC), Lung nodule, Lupus, O2 dependent, Osteomyelitis of hand (CMS/HCC), Osteoporosis, Pulmonary hypertension (CMS/HCC), PVD (peripheral vascular disease) (CMS/HCC), Raynaud's disease, Reflux esophagitis, Scleroderma (CMS/HCC), Screening mammogram for breast cancer (05/04/2021), Vertigo, and Wound, open, foot.    History of Present Illness  64 year old with metastatic lung adenocarcinoma, ILD, scleroderma, and PH presenting with worsening dyspnea.      Per Pulmonology, \"Patient would benefit from therapeutic thoracentesis with interventional radiology.  Given that her pleural effusion reoccurred 7 weeks after her last thora, and it is malignant, she would be a candidate for a Pleurx catheter placement. However, on discussion today, she states that she doesn't want a Pleurx and would prefer thoracentesis as needed when the fluid reaccumulates\"    Per prior therapy notes \"8/9/24 podiatry consulted for  necrosis of the left second toe along with other chronic BLE wounds. WBAT to BLE\"    Pending thoracentesis 11/25  "

## 2024-11-25 NOTE — ASSESSMENT & PLAN NOTE
Status post left thoracentesis today.  See discussion under heading of acute on chronic respiratory failure.

## 2024-11-25 NOTE — ACP (ADVANCE CARE PLANNING)
Palliative Care Advance Care Planning Note      Patient Name: Arielle GUAJARDO Isidro                                                                                        Patient MRN: [id number]  Discussion Date: 11/25/24   Discussion Participants: daughters Reji and Wagner as well as ANTIONE Mittal, Chaplain Luna and myself.   Start time: 2: 00 PM   End time: 2: 31 PM     Today participants wished to discuss Advance Care Planning. The following summarizes our discussion.    During our visit today, we discussed:     Code Status  Do Not Resuscitate / Allow Natural Death         Counseled patient and family regarding the benefits and burdens of CPR and mechanical ventilation in the setting of current clinical condition and co-morbidities.  Discussed that resuscitation will not reverse or cure underlying medical issues to include ILD, lung cancer, pulmonary hypertension or scleroderma and that a chance of meaningful recovery after cardiac arrest is incredibly low.   The recommendation for DNR/DNI was made to which the patient deferred approval vs rejection to her daughters.  Patient's daughter's expressed understanding and verbalized they do not want her to undergo heroic measures that will result in more harm than good.     It was explained to the family that the resuscitation status will be changed from full code to DNR/DNI in the EMR.    Patient Capacity  Patient has capacity to make their own medical decisions.        Attestation Statement:  I have spent a total of 30 minutes in face-to-face discussion of patient advance care planning with the patient and/or surrogate decision makers.    KARYNA Pop  11/25/2024 2:29 PM

## 2024-11-25 NOTE — SIGNIFICANT EVENT
Internal Medicine  RRT or Code Blue Note         SUMMARY OF EVENT   This is a 64 y.o. year-old female who was admitted on 11/23/2024 with  Pleural effusion on left [J90]  Acute respiratory failure with hypoxia (CMS/HCC) [J96.01] and is currently being treated for Acute on chronic respiratory failure with hypoxia (CMS/HCC).  I was called to the room by overhead page for RRT for hypoxia, hypertension, chest pain.  Upon arrival, patient had recently undergone a thoracentesis for malignant pleural effusion. CXR was done with concern for pulmonary edema. She was placed on bipap with some increased chest pain, and given dilaudid. She had improved oxygenation. Labetalol was given for BP with improvement from 190s to 170s. Primary team at this point comfortable to take over. Family was called and notified.     If worsening shob the plan recommended by pulmonology would be 40 of IV lasix.      CODE STATUS   Their Code Status at the start of the event was DNR (A.N.D.)     CODE LEADER   Dee Arellano, DO     PHYSICAL EXAM   Pre-Event Vital Signs: Temp:  [36.4 °C (97.5 °F)-37.3 °C (99.1 °F)] 37.3 °C (99.1 °F)  Heart Rate:  [] 74  Resp:  [20-30] 30  BP: (116-205)/(55-89) 122/58  FiO2 (%) (Set):  [40 %-50 %] 40 %    Physical Exam  Vitals and nursing note reviewed.   Constitutional:       General: She is in acute distress.      Appearance: She is ill-appearing and toxic-appearing. She is not diaphoretic.      Comments: cachectic   HENT:      Head: Normocephalic and atraumatic.   Eyes:      General: No scleral icterus.        Right eye: No discharge.         Left eye: No discharge.      Extraocular Movements: Extraocular movements intact.      Conjunctiva/sclera: Conjunctivae normal.   Cardiovascular:      Rate and Rhythm: Regular rhythm. Tachycardia present.      Heart sounds: Normal heart sounds. No murmur heard.     No friction rub. No gallop.   Pulmonary:      Effort: No respiratory distress.      Breath  sounds: No stridor. Rales present. No wheezing or rhonchi.      Comments: Diffuse crackles throughout   Chest:      Chest wall: No tenderness.   Abdominal:      General: Bowel sounds are normal. There is no distension.      Palpations: Abdomen is soft. There is no mass.      Tenderness: There is no abdominal tenderness. There is no guarding or rebound.      Hernia: No hernia is present.   Musculoskeletal:      Right lower leg: No edema.      Left lower leg: No edema.   Skin:     General: Skin is warm and dry.   Neurological:      General: No focal deficit present.      Mental Status: She is alert.          SIGNIFICANT LABS / IMAGING   Labs  yes    Imaging  I have independently reviewed the pertinent imaging from the last 24 hrs.    ECG/Telemetry  I have independently reviewed the ECG. No significant findings.   ASSESSMENT AND PLAN   Diagnosis: AHRF, iso ILD, malignant pleural effusion     Patient condition at end of event: stabilized     Disposition: Floors    KEY COMMUNICATION   Discussed with Attending (Kush Castañeda Jr.*) or Covering Attending (NA): Yes/No: yes    Discussed with Emergency Contact: Extended Emergency Contact Information  Primary Emergency Contact: manolomauricio  Mobile Phone: [phone]  Relation: Daughter  Secondary Emergency Contact: Wagner Khan  Mobile Phone: [phone]  Relation: Daughter: Yes/No: yes    Follow-Up/Handoff: done            ATTENDING DOCUMENTATION  ALSO SEE ATTENDING ATTESTATION SECTION OF NOTE

## 2024-11-25 NOTE — PLAN OF CARE
Problem: Adult Inpatient Plan of Care  Goal: Plan of Care Review  Outcome: Progressing  Flowsheets (Taken 11/25/2024 1410)  Progress: improving  Outcome Evaluation: OT Evaluation completed. Recommending return home with 24/7 assist for all mobility and ADLs.  Plan of Care Reviewed With: patient     Problem: Self-Care Deficit  Goal: Improved Ability to Complete Activities of Daily Living  Outcome: Progressing

## 2024-11-25 NOTE — PROGRESS NOTES
"   Physical Therapy -  Initial Evaluation     Patient: Arielle GUAJARDO Isidro  Location: [facility]  MRN: [id number]  Today's date: 11/25/2024    HISTORY OF PRESENT ILLNESS     Arielle is a 64 y.o. female admitted on 11/23/2024 with Pleural effusion on left [J90]  Acute respiratory failure with hypoxia (CMS/HCC) [J96.01]. Principal problem is Acute on chronic respiratory failure with hypoxia (CMS/HCC).    Past Medical History  Arielle has a past medical history of At risk for falls, De Quervain's tenosynovitis, Essential (primary) hypertension, Follicular carcinoma of thyroid (CMS/HCC), Gastro-esophageal reflux, Hand ulceration (CMS/HCC), Hyperlipidemia, unspecified, Interstitial lung disease (CMS/HCC), Leg ulcer (CMS/HCC), Lung nodule, Lupus, O2 dependent, Osteomyelitis of hand (CMS/HCC), Osteoporosis, Pulmonary hypertension (CMS/HCC), PVD (peripheral vascular disease) (CMS/HCC), Raynaud's disease, Reflux esophagitis, Scleroderma (CMS/HCC), Screening mammogram for breast cancer (05/04/2021), Vertigo, and Wound, open, foot.    History of Present Illness  64 year old with metastatic lung adenocarcinoma, ILD, scleroderma, and PH presenting with worsening dyspnea.      Per Pulmonology, \"Patient would benefit from therapeutic thoracentesis with interventional radiology.  Given that her pleural effusion reoccurred 7 weeks after her last thora, and it is malignant, she would be a candidate for a Pleurx catheter placement. However, on discussion today, she states that she doesn't want a Pleurx and would prefer thoracentesis as needed when the fluid reaccumulates\"    Per prior therapy notes \"8/9/24 podiatry consulted for  necrosis of the left second toe along with other chronic BLE wounds. WBAT to BLE\"    Pending thoracentesis 11/25  PRIOR LEVEL OF FUNCTION AND LIVING ENVIRONMENT     Prior Level of Function      Flowsheet Row Most Recent Value   Dominant Hand right   Ambulation assistive " person   Transferring assistive person   Toileting assistive person   Bathing assistive equipment and person   Dressing assistive person   Eating independent   IADLs assistive person   Driving/Transportation friends/family   Communication understands/communicates without difficulty   Swallowing swallows foods/liquids without difficulty   Baseline Diet/Method of Nutritional Intake no diet restrictions   Past History of Dysphagia No prior hx of dysphagia per pt chart or pt report, pt denies any swallowing difficulty with intake   Prior Level of Function Comment Person to assist for ambulation.   Assistive Device Currently Used at Home commode, 3-in-1, wheelchair, stair glide, oxygen             Prior Living Environment      Flowsheet Row Most Recent Value   People in Home alone, other (see comments)  [Pt has 24/7 HHA from [facility]- pt's dtr/Wagner is her paid HHA]   Current Living Arrangements home   Home Accessibility stairs to enter home (Group), stairs within home (Group)   Living Environment Comment 2STH with 5-6 LARRY (needs person for assist),  stair glide to 2nd floor with tub/shower set up.          VITALS AND PAIN     PT Vitals      Date/Time Pulse HR Source SpO2 Pt Activity O2 Therapy O2 Del Method O2 Flow Rate BP MAP BP Location BP Method Pt Position Observations [facility]   11/25/24 1059 106 Monitor 97 % At rest Supplemental oxygen Nasal cannula 5 L/min 138/62 89 mmHg Left upper arm Automatic Lying PT/OT    11/25/24 1108 110 Monitor 87 % At rest Supplemental oxygen Nasal cannula 6 L/min -- -- -- -- Sitting PT/OT, EOB    11/25/24 1130 100 Monitor 94 % At rest Supplemental oxygen Nasal cannula 5 L/min -- -- -- -- Sitting RN notified of vitals PT/OT, OOB in chair           PT Pain      Date/Time Pain Type Side/Orientation Location Rating: Rest Rating: Activity Description Nonverbal Indicators Interventions Response To Interventions [facility]   11/25/24 1059 Pain Assessment left chest 8 8 tightness  anxious;crying breathing exercises utilized mobility function improved DAVID             Objective   OBJECTIVE     Start time:  1054  End time:  1133  Session Length: 39 min  Mode of Treatment: co-treatment  Reason for co-treatment: limited functional endurance ; increased O2 needs    General Observations  Patient received upright, in bed. She was agreeable to therapy, no issues or concerns identified by nurse prior to session. Initially hesitant for participation, benefits from encouragement.    Precautions: aspiration, fall, oxygen therapy device and L/min (5-6L O2)   **known foot wounds, previous podiatry consults all indicate WBAT BLEs for chronic necrosis**    Limitations/Impairments: safety/cognitive   Services  Do You Speak a Language Other Than English at Home?: no      PT Eval and Treat - 11/25/24 1054          Cognition    Orientation Status oriented x 4     Affect/Mental Status WFL;anxious;emotionally labile     Behavioral Issues withdrawn     Follows Commands WFL;75-90% accuracy;increased processing time needed;initiation impaired     Cognitive Function executive function deficit     Executive Function Deficit minimal deficit;insight/awareness of deficits;information processing     Comment, Cognition Pt awake, alert, and oriented. Pt tearful t/o session, benefits from increased time and verbal encouragement. Cooperative and pleasant during session.        Vision Assessment/Intervention    Vision Assessment WFL        Hearing Assessment    Hearing Status WFL        Sensory Assessment    Sensory Assessment sensation intact, lower extremities        Lower Extremity Assessment    LE Assessment ROM and Strength WFL     General Observations limited terminal knee extension, +pain        Motor Skills    Motor Skills postural deviations     Muscle Tone upper extremity(s);lower extremity(s)   muscle atrophy present t/o ; +cachectic appearing       Postural Deviations    Postural Deviations head and  neck;upper back;thorax     Head and Neck forward head     Upper Back kyphosis;winging     Thorax shortened     Comment, Postural Deviations poor upright posture        Bed Mobility    Bed Mobility Activities supine to sit     Steinauer minimum assist (75% or more patient effort)     Safety/Cues increased time to complete     Assistive Device bed rails;head of bed elevated     Comment out of bed towards L side with HOB elevated, Francois for completion        Mobility Belt    Mobility Belt Used During Session no - patient refused        Sit/Stand Transfer    Surface edge of bed;chair with arm rests     Steinauer minimum assist (75% or more patient effort)     Safety/Cues increased time to complete     Assistive Device other (see comments)   HHA    Transfer Comments pt looking to therapist for assist during transfers, Francois provided ; forward flexed posture        Surface-to-Surface Transfers    Transfer Location commode;bed to chair     Transfer Technique stand step     Steinauer minimum assist (75% or more patient effort)     Safety/Cues increased time to complete     Assistive Device other (see comments)   HHA    Transfer Comments HHA provided for out of bed to commode and commode to recliner chair ; Francois for safe completion- increased time secondary to BOWSER        Gait Training    Steinauer, Gait minimum assist (75% or more patient effort)     Safety/Cues increased time to complete     Assistive Device other (see comments)   HHA    Distance in Feet 6 feet   3ft bed>commode ; 3ft commode>recliner chair    Pattern step-to     Deviations/Abnormal Patterns base of support, narrow;step length decreased;stride length decreased     Comment 3ftx2 bed>commode>chair ; increased time secondary to BOWSER ; Francois with HHA for completion - tearful t/o        Stairs Training    Steinauer, Stairs not tested     Comment pt endorses physical assist x1 at all times during navigation of stairs to enter home ; access to stair  glide inside        Balance    Static Sitting Balance WFL;sitting, edge of bed     Dynamic Sitting Balance WFL;sitting, edge of bed     Sit to Stand Dynamic Balance mild impairment;supported     Static Standing Balance mild impairment;supported     Dynamic Standing Balance mild impairment;supported     Balance Interventions occupation based/functional task     Comment, Balance Francois and HHA for mobility completion        Lower Extremity (Therapeutic Exercise)    Exercise Position/Type seated     General Exercise bilateral;marching while seated;LAQ (long arc quad)     Reps and Sets 2x8     Comment verbal cues for technique and PLB during completion ; AAROM for TKE (hamstring tightness noted with reports of calf pain with ankle DF)        Impairments/Safety Issues    Impairments Affecting Function balance;endurance/activity tolerance;strength;pain;shortness of breath;postural/trunk control     Functional Endurance BOWSER with all mobility ; 5L to 6L for activity completion with frequent rest breaks incorporated.     Safety Issues Affecting Function insight into deficits/self-awareness                                    Education Documentation  Joint Mobility/Strength, taught by Yamilka Ramos PT at 11/25/2024  2:05 PM.  Learner: Patient  Readiness: Acceptance  Method: Explanation, Demonstration  Response: Verbalizes Understanding  Comment: role of PT, safety awareness, functional transfers, PLB/breathing exercises, activity modifications.    Energy Conservation, taught by Yamilka Ramos PT at 11/25/2024  2:05 PM.  Learner: Patient  Readiness: Acceptance  Method: Explanation, Demonstration  Response: Verbalizes Understanding  Comment: role of PT, safety awareness, functional transfers, PLB/breathing exercises, activity modifications.        Session Outcome  Patient upright, in chair, leg(s) elevated at end of session, chair alarm on, all needs met, call light in reach, personal items in reach. Nursing notified  about patient's performance, patient's position, patient's response to therapy/activity, and change in vital signs.    AM-PAC™ - Mobility (Current Function)     Turning form your back to your side while in flat bed without using bedrails 3 - A Little   Moving from lying on your back to sitting on the side of a flat bed without using bedrails 3 - A Little   Moving to and from a bed to a chair 3 - A Little   Standing up from a chair using your arms 3 - A Little   To walk in a hospital room 3 - A Little   Climbing 3-5 steps with a railing 3 - A Little   AM-PAC™ Mobility Score 18      ASSESSMENT AND PLAN     Progress Summary  Pt is a 65 y/o female currently presenting to skilled PT IE with known diagnosis of metastatic lung adenocarcinoma demonstrating baseline completion of functional mobility. Pt has 24 care at home and requires Ax1 for all mobility/activity. Pt largely limited by BOWSER/SOB and wears O2 at home incorporating frequent rest breaks as needed. No further skilled PT needs identified in this setting and PT will sign off with recommendation for out of bed as tolerated with facility/nursing staff. Recommendation to return home at discharge with previous level of assistance and home PT if desired.    Patient/Family Therapy Goals Statement: to go home    PT Plan      Flowsheet Row Most Recent Value   Therapy Frequency evaluation only at 11/25/2024 1054            PT Discharge Recommendations      Flowsheet Row Most Recent Value   PT Recommended Discharge Disposition home with home health, home with assistance  [24/7 care] at 11/25/2024 1054   Anticipated Equipment Needs if Discharged Home (PT) none at 11/25/2024 1054

## 2024-11-25 NOTE — PLAN OF CARE
Care Coordination Admission Assessment Note    General Information:  Readmission Within the last 30 days: no previous admission in last 30 days  Does patient have a : No  Patient-Specific Goals (include timeframe):      Living Arrangements:  Arrived From: home  Current Living Arrangements: home  People in Home: alone, other (see comments) (Pt has 24/7 HHA from [facility]- pt's dtr/Wagner is her paid HHA)  Home Accessibility: stairs to enter home (Group), stairs within home (Group)  Living Arrangement Comments: 2 SH, 11 LARRY and has 14 steps to the second floor but pt utalizes stairglide    Housing Stability and Utility Access (SDOH):  In the last 12 months, was there a time when you were not able to pay the mortgage or rent on time?: No  In the past 12 months, how many times have you moved?: 0  At any time in the past 12 months, were you homeless or living in a shelter (including now)?: No  In the past 12 months has the electric, gas, oil, or water company threatened to shut off services in your home?:      Functional Status Prior to Admission:   Assistive Device/Animal Currently Used at Home: commode, 3-in-1, wheelchair, stair glide, oxygen  Functional Status Comments: Requires assistance  for ADL's  IADL Comments:       Supports and Services:  Current Outpatient/Agency/Support Group: homecare agency  Type of Current Home Care Services: home PT, nursing, home health aide  History of home care episode or rehab stay: Open with [facility] PTA- sent referral for resumption of HC    Discharge Needs Assessment:   Concerns to be Addressed: adjustment to diagnosis/illness, care coordination/care conferences, discharge planning  Current Discharge Risk: lives alone  Anticipated Changes Related to Illness: none    Patient/Family Anticipated Discharge Plan:  Patient/Family Anticipates Transition To: home with help/services  Patient/Family Anticipated Services at Transition: home health care    Connection  to Community  Not applicable    Patient Choice:   Offered/Gave Vendor List: yes  Patient's Choice of Community Agency(s): [facility]  Patient and/or patient guardian/advocate was made aware of their right to choose a provider. A list of eligible providers was presented and reviewed with the patient and/or patient guardian/advocate in written and/or verbal form. The list delineates providers in the patient’s desired geographic area who are participating in the Medicare program and/or providers contracted with the patient’s primary insurance. The Medicare list and quality ratings were obtained from the Medicare.gov [medicare.gov] website.    Anticipated Discharge Plan:  Met with patient. Provided education and contact information for Care Coordination services.: yes  Anticipated Discharge Disposition: home with home health  Type of Home Care Services: nursing, home PT, home health aide    Transportation Needs (SDOH):  Transportation Concerns: none  Transportation Anticipated: family or friend will provide  Is Out of Hospital DNR needed at discharge?:      In the past 12 months, has lack of transportation kept you from medical appointments or from getting medications?: No  In the past 12 months, has lack of transportation kept you from meetings, work, or from getting things needed for daily living?: No    Concerns - comments: Per info received in DPR today, pt admitted with SOB, currently on O2 5L NC and plan for IR today for L thoracentesis. CCRN met with patient at bedside to complete CM admission assessment, initiate discharge planning and discuss any potential discharge needs. I introduce myself and explained the role of Care Coordination. Address, PCP, Pharmacy, Insurance and emergency contact information documented in Epic confirmed. Anticipating d/c home with HC  and 24/7 HHA when medically stable. [facility] following. Pt use oxygen 2-4 L at home, has concentrator and POC but couldn't recall the provider. CC will  continue to follow for emotional support and dispo planning until discharge is completed    1500: Advance directive packet given to the pt per request

## 2024-11-25 NOTE — POST-PROCEDURE NOTE
Interventional Radiology Brief Postprocedure Note    Arielle Hammondstower     Attending: RAS Montes / Fuad Feliciano M.D.    Assistant: n/a    Diagnosis: sOB    Description of procedure: US Guided left thoracentesis    Contrast: none     Anesthesia:  Local (Lidocaine)    Volume of Lidocaine Utilized (ml): 10ml     Medications: none     Complications: None      Estimated Blood Loss: Estimated Blood Loss: None    Anticoagulation: \n/a    Specimens: 800 ml clear yellow fluid    Findings: Successful US guided left thoracentesis. 800 ml removed. Hypertensive and tachypneic throughout.    11/25/2024 12:15 PM

## 2024-11-25 NOTE — ASSESSMENT & PLAN NOTE
Pulmonology and cardiology consultations appreciated.  Patient with lung cancer with malignant pleural effusion, underlying pulmonary hypertension, interstitial lung disease and HFpEF. CT Chest showed evidence of worsening L pleural effusion, mucous plugging and debris concerning for aspiration.  She underwent left thoracentesis today with removal of 800 mL of fluid.  After the procedure she developed chest pain, hypertension, tachycardia, and worsening hypoxemia, as described above.  Her symptoms resolved after the interventions described above.  Allergic to most diuretics, continue home ethacrynic acid

## 2024-11-25 NOTE — PROGRESS NOTES
"   Occupational Therapy -  Initial Evaluation     Patient: Arielle GUAJARDO Isidro  Location: [facility]  MRN: [id number]  Today's date: 11/25/2024    HISTORY OF PRESENT ILLNESS     Arielle is a 64 y.o. female admitted on 11/23/2024 with Pleural effusion on left [J90]  Acute respiratory failure with hypoxia (CMS/HCC) [J96.01]. Principal problem is Acute on chronic respiratory failure with hypoxia (CMS/HCC).    Past Medical History  Arielle has a past medical history of At risk for falls, De Quervain's tenosynovitis, Essential (primary) hypertension, Follicular carcinoma of thyroid (CMS/HCC), Gastro-esophageal reflux, Hand ulceration (CMS/HCC), Hyperlipidemia, unspecified, Interstitial lung disease (CMS/HCC), Leg ulcer (CMS/HCC), Lung nodule, Lupus, O2 dependent, Osteomyelitis of hand (CMS/HCC), Osteoporosis, Pulmonary hypertension (CMS/HCC), PVD (peripheral vascular disease) (CMS/HCC), Raynaud's disease, Reflux esophagitis, Scleroderma (CMS/HCC), Screening mammogram for breast cancer (05/04/2021), Vertigo, and Wound, open, foot.    History of Present Illness  64 year old with metastatic lung adenocarcinoma, ILD, scleroderma, and PH presenting with worsening dyspnea.      Per Pulmonology, \"Patient would benefit from therapeutic thoracentesis with interventional radiology.  Given that her pleural effusion reoccurred 7 weeks after her last thora, and it is malignant, she would be a candidate for a Pleurx catheter placement. However, on discussion today, she states that she doesn't want a Pleurx and would prefer thoracentesis as needed when the fluid reaccumulates\"    Per prior therapy notes \"8/9/24 podiatry consulted for  necrosis of the left second toe along with other chronic BLE wounds. WBAT to BLE\"    Pending thoracentesis 11/25  PRIOR LEVEL OF FUNCTION AND LIVING ENVIRONMENT     Prior Level of Function      Flowsheet Row Most Recent Value   Dominant Hand right   Ambulation assistive " person   Transferring assistive person   Toileting assistive person   Bathing assistive equipment and person   Dressing assistive person   Eating independent   IADLs assistive person   Driving/Transportation friends/family   Communication understands/communicates without difficulty   Swallowing swallows foods/liquids without difficulty   Baseline Diet/Method of Nutritional Intake no diet restrictions   Past History of Dysphagia No prior hx of dysphagia per pt chart or pt report, pt denies any swallowing difficulty with intake   Prior Level of Function Comment Person to assist for ambulation.   Assistive Device Currently Used at Home commode, 3-in-1, wheelchair, stair glide, oxygen             Prior Living Environment      Flowsheet Row Most Recent Value   People in Home alone, other (see comments)  [Pt has 24/7 HHA from [facility]- pt's dtr/Wagner is her paid HHA]   Current Living Arrangements home   Home Accessibility stairs to enter home (Group), stairs within home (Group)   Living Environment Comment 2STH with 5-6 LARRY (needs person for assist),  stair glide to 2nd floor with tub/shower set up.          Occupational Profile      Flowsheet Row Most Recent Value   Environmental Supports and Barriers reports having 24/7 assistance          VITALS AND PAIN     OT Vitals      Date/Time Pulse HR Source SpO2 Pt Activity O2 Therapy O2 Del Method O2 Flow Rate BP MAP BP Location BP Method Pt Position Observations [facility]   11/25/24 1059 106 Monitor 97 % At rest Supplemental oxygen Nasal cannula 5 L/min 138/62 89 mmHg Left upper arm Automatic Lying PT/OT    11/25/24 1108 110 Monitor 87 % At rest Supplemental oxygen Nasal cannula 6 L/min -- -- -- -- Sitting PT/OT, EOB DAVID          OT Pain      Date/Time Pain Type Side/Orientation Location Rating: Rest Rating: Activity Description Nonverbal Indicators Interventions Response To Interventions [facility]   11/25/24 1059 Pain Assessment chest left 8 8 tightness anxious;crying  breathing exercises utilized mobility function improved DAVID             Objective   OBJECTIVE     Start time:  1055  End time:  1134  Session Length: 39 min  Mode of Treatment: co-treatment  Reason for co-treatment: Limited functional endurance, increasing O2 needs    General Observations  Patient received upright, in bed. She was agreeable to therapy, no issues or concerns identified by nurse prior to session. Sitting upright in bed, very tearful but agreeable to session    Precautions: aspiration, fall (5-6L O2 needed)       Limitations/Impairments: safety/cognitive      OT Eval and Treat - 11/25/24 1055          Cognition    Orientation Status oriented x 4     Affect/Mental Status WFL;anxious;emotionally labile     Behavioral Issues withdrawn     Follows Commands WFL;75-90% accuracy;increased processing time needed;initiation impaired     Cognitive Function executive function deficit     Executive Function Deficit minimal deficit;insight/awareness of deficits;information processing     Comment, Cognition AAOx4, very tearful t/o session regarding prognosis and current hospital stay. Benefitting from repeated encouragement for participation.        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 Weak gross grasp bilaterally.        Bed Mobility    Bed Mobility Activities supine to sit;sit to supine     [place] minimum assist (75% or more patient effort)     Safety/Cues increased time to complete;verbal cues;sequencing;technique;initiation     Assistive Device bed rails     Comment Kari needed to sit to L EOB, increased time needed. Denies dizziness. Requiring frequent rest breaks post mobility. Cues for pursed lip  breathing.        Sit/Stand Transfer    Surface edge of bed;elevated bed     [place] minimum assist (75% or more patient effort)     Safety/Cues verbal cues;increased time to complete;hand placement;initiation     Assistive Device walker, front-wheeled     Transfer Comments Kari needed to stand from EOB and BSC, +forward flexed posture        Toilet Transfer    Transfer Technique sit/stand     [place] minimum assist (75% or more patient effort)     Safety/Cues increased time to complete;verbal cues;initiation;hand placement     Assistive Device walker, front-wheeled     Comment Sitting to BSC, cues for hand placement        Functional Mobility    Distance few steps at bedside     Functional Mobility [place] minimum assist (75% or more patient effort)     Safety/Cues increased time to complete;verbal cues;hand placement     Assistive Device walker, front-wheeled     Functional Mobility Comments Kari to stand from EOB, take few steps to BSC. Few steps to recliner at end of session. Seated with pillow support.        Upper Body Dressing    Tasks don;hospital gown     [place] minimum assist (75% or more patient effort)     Safety/Cues increased time to complete     Position edge of bed sitting     Comment Kari to adjust gown at EOB        Lower Body Dressing    Tasks socks;don     [place] maximum assist (25-49% patient effort)     Safety/Cues increased time to complete     Position edge of bed sitting     Comment MaxA to don socks when sitting EOB, able to elevate LEs from floor        Grooming    Tasks washes, rinses and dries hands;washes, rinses and dries face     [place] supervision     Safety/Cues increased time to complete     Position edge of bed sitting     Comment Supplies provided        Toileting    Tasks perform bladder hygiene;perform bowel hygiene     [place] maximum assist (25-49% patient effort)     Position supported sitting     Comment Able to void when sitting on  BSC, maxA for lita hygiene in standing        Balance    Static Sitting Balance WFL;sitting, edge of bed     Dynamic Sitting Balance WFL;sitting, edge of bed     Sit to Stand Dynamic Balance mild impairment;supported     Static Standing Balance mild impairment;supported     Dynamic Standing Balance mild impairment;supported     Comment, Balance Benefitting form consistent use of RW, Ax1 for support        Impairments/Safety Issues    Impairments Affecting Function balance;endurance/activity tolerance;strength;pain;shortness of breath;postural/trunk control     Functional Endurance Increasingly SOB with all mobility. Requiring 6L with mobility. Desatting to mid 80s. Difficulty obtaining accute pulse ox reading.     Safety Issues Affecting Function insight into deficits/self-awareness                                    Education Documentation  Treatment Plan, taught by Annabelle Rosas OT at 11/25/2024  2:11 PM.  Learner: Patient  Readiness: Acceptance  Method: Explanation  Response: Verbalizes Understanding  Comment: Role of OT, OT POC, adapted self care strategies, d/c planning        Session Outcome  Patient upright, in chair at end of session, chair alarm on, all needs met, call light in reach, personal items in reach. Nursing notified about change in vital signs, patient's response to therapy/activity, patient's performance, and patient's position.    AM-PAC™ - ADL (Current Function)     Putting on/taking off regular lower body clothing 2 - A Lot   Bathing 2 - A Lot   Toileting 2 - A Lot   Putting on/taking off regular upper body clothing 2 - A Lot   Help for taking care of personal grooming 3 - A Little   Eating meals 3 - A Little   AM-PAC™ ADL Score 14      ASSESSMENT AND PLAN     Progress Summary  OT Evaluation completed. Admitted with respiratory failure in setting of metastatic lung cancer. AAOx4, very tearful. Kari needed for all functional mobility and trasnfers to BSC and recliner. MaxA for  hygiene post toileting and LBD, Francois for UBD. Very limited by tenuous respiratory status, requiring 6L via NC with minimal OOB activity. SpO2 mid 80s t/o session. Patient with 24/7 assist at home for all ADLs and IADLs. Has all needed DME. Recommending d/c home with current level of assist pending ongling GOC.    Patient/Family Therapy Goal Statement: get better, improve breathing    OT Plan      Flowsheet Row Most Recent Value   Therapy Frequency evaluation only at 11/25/2024 1055            OT Discharge Recommendations      Flowsheet Row Most Recent Value   OT Recommended Discharge Disposition home with assistance  [24/7 from family] at 11/25/2024 1055   Anticipated Equipment Needs if Discharged Home (OT) --  [has all needed DME] at 11/25/2024 1055                 OT Goals      Flowsheet Row Most Recent Value   Bed Mobility Goal 1    Activity/Assistive Device bed mobility activities, all at 11/25/2024 1055   [place] modified independence at 11/25/2024 1055   Time Frame by discharge at 11/25/2024 1055   Progress/Outcome goal ongoing at 11/25/2024 1055   Transfer Goal 1    Activity/Assistive Device all transfers at 11/25/2024 1055   [place] modified independence at 11/25/2024 1055   Time Frame by discharge at 11/25/2024 1055   Progress/Outcome goal ongoing at 11/25/2024 1055   Dressing Goal 1    Activity/Adaptive Equipment dressing skills, all at 11/25/2024 1055   [place] modified independence at 11/25/2024 1055   Time Frame by discharge at 11/25/2024 1055   Progress/Outcome goal ongoing at 11/25/2024 1055   Toileting Goal 1    Activity/Assistive Device toileting skills, all at 11/25/2024 1055   [place] modified independence at 11/25/2024 1055   Time Frame by discharge at 11/25/2024 1055   Progress/Outcome goal ongoing at 11/25/2024 1055   Grooming Goal 1    Activity/Assistive Device grooming skills, all at 11/25/2024 1055   [place] modified independence at 11/25/2024 1055   Time Frame by  discharge at 11/25/2024 1051   Progress/Outcome goal ongoing at 11/25/2024 105

## 2024-11-26 LAB
ANION GAP SERPL CALC-SCNC: 7 MEQ/L (ref 3–15)
BUN SERPL-MCNC: 15 MG/DL (ref 7–25)
CALCIUM SERPL-MCNC: 9 MG/DL (ref 8.6–10.3)
CHLORIDE SERPL-SCNC: 108 MEQ/L (ref 98–107)
CO2 SERPL-SCNC: 26 MEQ/L (ref 21–31)
CREAT SERPL-MCNC: 1 MG/DL (ref 0.6–1.2)
EGFRCR SERPLBLD CKD-EPI 2021: >60 ML/MIN/1.73M*2
ERYTHROCYTE [DISTWIDTH] IN BLOOD BY AUTOMATED COUNT: 16.7 % (ref 11.7–14.4)
GLUCOSE SERPL-MCNC: 85 MG/DL (ref 70–99)
HCT VFR BLD AUTO: 35.2 % (ref 35–45)
HGB BLD-MCNC: 10.9 G/DL (ref 11.8–15.7)
MAGNESIUM SERPL-MCNC: 1.7 MG/DL (ref 1.8–2.5)
MCH RBC QN AUTO: 30.4 PG (ref 28–33.2)
MCHC RBC AUTO-ENTMCNC: 31 G/DL (ref 32.2–35.5)
MCV RBC AUTO: 98.3 FL (ref 83–98)
PLATELET # BLD AUTO: 188 K/UL (ref 150–369)
PMV BLD AUTO: 11.7 FL (ref 9.4–12.3)
POTASSIUM SERPL-SCNC: 4.1 MEQ/L (ref 3.5–5.1)
RBC # BLD AUTO: 3.58 M/UL (ref 3.93–5.22)
SODIUM SERPL-SCNC: 141 MEQ/L (ref 136–145)
WBC # BLD AUTO: 7.64 K/UL (ref 3.8–10.5)

## 2024-11-26 PROCEDURE — 63700000 HC SELF-ADMINISTRABLE DRUG: Performed by: NURSE PRACTITIONER

## 2024-11-26 PROCEDURE — 80048 BASIC METABOLIC PNL TOTAL CA: CPT | Performed by: INTERNAL MEDICINE

## 2024-11-26 PROCEDURE — 63700000 HC SELF-ADMINISTRABLE DRUG: Performed by: INTERNAL MEDICINE

## 2024-11-26 PROCEDURE — 99233 SBSQ HOSP IP/OBS HIGH 50: CPT | Performed by: INTERNAL MEDICINE

## 2024-11-26 PROCEDURE — 99233 SBSQ HOSP IP/OBS HIGH 50: CPT | Mod: 25 | Performed by: NURSE PRACTITIONER

## 2024-11-26 PROCEDURE — 85027 COMPLETE CBC AUTOMATED: CPT | Performed by: INTERNAL MEDICINE

## 2024-11-26 PROCEDURE — 36415 COLL VENOUS BLD VENIPUNCTURE: CPT | Performed by: INTERNAL MEDICINE

## 2024-11-26 PROCEDURE — 83735 ASSAY OF MAGNESIUM: CPT | Performed by: INTERNAL MEDICINE

## 2024-11-26 PROCEDURE — 12000000 HC ROOM AND CARE MED/SURG

## 2024-11-26 PROCEDURE — 63600000 HC DRUGS/DETAIL CODE: Mod: JZ | Performed by: NURSE PRACTITIONER

## 2024-11-26 PROCEDURE — 25000000 HC PHARMACY GENERAL: Performed by: INTERNAL MEDICINE

## 2024-11-26 PROCEDURE — 200200 PR NO CHARGE: Performed by: NURSE PRACTITIONER

## 2024-11-26 PROCEDURE — 99498 ADVNCD CARE PLAN ADDL 30 MIN: CPT | Performed by: NURSE PRACTITIONER

## 2024-11-26 PROCEDURE — 63600000 HC DRUGS/DETAIL CODE: Mod: JZ | Performed by: INTERNAL MEDICINE

## 2024-11-26 PROCEDURE — 94660 CPAP INITIATION&MGMT: CPT

## 2024-11-26 PROCEDURE — 99497 ADVNCD CARE PLAN 30 MIN: CPT | Mod: 25 | Performed by: NURSE PRACTITIONER

## 2024-11-26 PROCEDURE — 63600000 HC DRUGS/DETAIL CODE: Mod: JZ

## 2024-11-26 RX ORDER — ACETAMINOPHEN 650 MG/1
650 SUPPOSITORY RECTAL EVERY 8 HOURS
Status: DISCONTINUED | OUTPATIENT
Start: 2024-11-26 | End: 2024-11-26

## 2024-11-26 RX ORDER — BISACODYL 10 MG/1
10 SUPPOSITORY RECTAL DAILY PRN
Status: DISCONTINUED | OUTPATIENT
Start: 2024-11-26 | End: 2024-11-30 | Stop reason: HOSPADM

## 2024-11-26 RX ORDER — CALCIUM CARBONATE 200(500)MG
400 TABLET,CHEWABLE ORAL 3 TIMES DAILY PRN
Status: DISCONTINUED | OUTPATIENT
Start: 2024-11-26 | End: 2024-11-30 | Stop reason: HOSPADM

## 2024-11-26 RX ORDER — SODIUM CHLORIDE 0.9 % (FLUSH) 0.9 %
3 SYRINGE (ML) INJECTION AS NEEDED
Status: DISCONTINUED | OUTPATIENT
Start: 2024-11-26 | End: 2024-11-30 | Stop reason: HOSPADM

## 2024-11-26 RX ORDER — ACETAMINOPHEN 325 MG/1
650 TABLET ORAL EVERY 8 HOURS
Status: DISCONTINUED | OUTPATIENT
Start: 2024-11-26 | End: 2024-11-26

## 2024-11-26 RX ORDER — ONDANSETRON HYDROCHLORIDE 2 MG/ML
4 INJECTION, SOLUTION INTRAVENOUS
Status: DISCONTINUED | OUTPATIENT
Start: 2024-11-26 | End: 2024-11-30 | Stop reason: HOSPADM

## 2024-11-26 RX ORDER — LORAZEPAM 2 MG/ML
0.5 INJECTION INTRAMUSCULAR EVERY 6 HOURS PRN
Status: DISCONTINUED | OUTPATIENT
Start: 2024-11-26 | End: 2024-11-26

## 2024-11-26 RX ORDER — GLYCOPYRROLATE 0.6MG/3ML
0.2 SYRINGE (ML) INTRAVENOUS EVERY 4 HOURS PRN
Status: DISCONTINUED | OUTPATIENT
Start: 2024-11-26 | End: 2024-11-30 | Stop reason: HOSPADM

## 2024-11-26 RX ORDER — HYDROMORPHONE HYDROCHLORIDE 1 MG/ML
1 INJECTION, SOLUTION INTRAMUSCULAR; INTRAVENOUS; SUBCUTANEOUS EVERY 2 HOUR PRN
Status: DISCONTINUED | OUTPATIENT
Start: 2024-11-26 | End: 2024-11-29

## 2024-11-26 RX ORDER — ACETAMINOPHEN 650 MG/1
650 SUPPOSITORY RECTAL EVERY 6 HOURS PRN
Status: DISCONTINUED | OUTPATIENT
Start: 2024-11-26 | End: 2024-11-26

## 2024-11-26 RX ORDER — ACETAMINOPHEN 650 MG/20.3ML
975 LIQUID ORAL EVERY 8 HOURS
Status: DISCONTINUED | OUTPATIENT
Start: 2024-11-26 | End: 2024-11-26

## 2024-11-26 RX ORDER — ONDANSETRON 4 MG/1
4 TABLET, ORALLY DISINTEGRATING ORAL
Status: DISCONTINUED | OUTPATIENT
Start: 2024-11-26 | End: 2024-11-30 | Stop reason: HOSPADM

## 2024-11-26 RX ORDER — ACETAMINOPHEN 325 MG/1
975 TABLET ORAL EVERY 8 HOURS PRN
Status: DISCONTINUED | OUTPATIENT
Start: 2024-11-26 | End: 2024-11-30 | Stop reason: HOSPADM

## 2024-11-26 RX ORDER — LORAZEPAM 2 MG/ML
1 INJECTION INTRAMUSCULAR EVERY 4 HOURS PRN
Status: DISCONTINUED | OUTPATIENT
Start: 2024-11-26 | End: 2024-11-30 | Stop reason: HOSPADM

## 2024-11-26 RX ORDER — PROCHLORPERAZINE EDISYLATE 5 MG/ML
5 INJECTION INTRAMUSCULAR; INTRAVENOUS EVERY 6 HOURS PRN
Status: DISCONTINUED | OUTPATIENT
Start: 2024-11-26 | End: 2024-11-30 | Stop reason: HOSPADM

## 2024-11-26 RX ADMIN — LORAZEPAM 0.5 MG: 2 INJECTION INTRAMUSCULAR; INTRAVENOUS at 09:16

## 2024-11-26 RX ADMIN — IPRATROPIUM BROMIDE AND ALBUTEROL SULFATE 3 ML: .5; 3 SOLUTION RESPIRATORY (INHALATION) at 01:09

## 2024-11-26 RX ADMIN — HYDROMORPHONE HYDROCHLORIDE 1 MG: 1 INJECTION, SOLUTION INTRAMUSCULAR; INTRAVENOUS; SUBCUTANEOUS at 18:47

## 2024-11-26 RX ADMIN — HYDROMORPHONE HYDROCHLORIDE 0.5 MG: 1 INJECTION, SOLUTION INTRAMUSCULAR; INTRAVENOUS; SUBCUTANEOUS at 05:25

## 2024-11-26 RX ADMIN — IPRATROPIUM BROMIDE AND ALBUTEROL SULFATE 3 ML: .5; 3 SOLUTION RESPIRATORY (INHALATION) at 18:02

## 2024-11-26 RX ADMIN — CLOPIDOGREL 75 MG: 75 TABLET ORAL at 08:20

## 2024-11-26 RX ADMIN — HYDROMORPHONE HYDROCHLORIDE 0.5 MG: 1 INJECTION, SOLUTION INTRAMUSCULAR; INTRAVENOUS; SUBCUTANEOUS at 11:16

## 2024-11-26 RX ADMIN — SILDENAFIL 20 MG: 20 TABLET, FILM COATED ORAL at 08:20

## 2024-11-26 RX ADMIN — ACETAMINOPHEN 975 MG: 650 SOLUTION ORAL at 21:11

## 2024-11-26 RX ADMIN — ACETYLCYSTEINE 200 MG: 200 INHALANT RESPIRATORY (INHALATION) at 08:20

## 2024-11-26 RX ADMIN — SUCRALFATE 1 G: 1 TABLET ORAL at 09:10

## 2024-11-26 RX ADMIN — LEVOTHYROXINE SODIUM 100 MCG: 0.1 TABLET ORAL at 05:21

## 2024-11-26 RX ADMIN — SUCRALFATE 1 G: 1 TABLET ORAL at 16:35

## 2024-11-26 RX ADMIN — ANTACID TABLETS 400 MG OF ELEMENTAL CALCIUM: 500 TABLET, CHEWABLE ORAL at 21:45

## 2024-11-26 RX ADMIN — ACETAMINOPHEN 650 MG: 325 TABLET ORAL at 01:04

## 2024-11-26 RX ADMIN — SILDENAFIL 20 MG: 20 TABLET, FILM COATED ORAL at 13:32

## 2024-11-26 RX ADMIN — HYDROMORPHONE HYDROCHLORIDE 1 MG: 1 INJECTION, SOLUTION INTRAMUSCULAR; INTRAVENOUS; SUBCUTANEOUS at 16:35

## 2024-11-26 RX ADMIN — ACETAMINOPHEN 975 MG: 650 SOLUTION ORAL at 14:17

## 2024-11-26 RX ADMIN — SALINE NASAL SPRAY 2 SPRAY: 1.5 SOLUTION NASAL at 02:05

## 2024-11-26 RX ADMIN — ETHACRYNIC ACID 25 MG: 25 TABLET ORAL at 08:20

## 2024-11-26 RX ADMIN — HYDROMORPHONE HYDROCHLORIDE 0.5 MG: 1 INJECTION, SOLUTION INTRAMUSCULAR; INTRAVENOUS; SUBCUTANEOUS at 01:42

## 2024-11-26 RX ADMIN — Medication 25 MCG: at 08:20

## 2024-11-26 RX ADMIN — IPRATROPIUM BROMIDE AND ALBUTEROL SULFATE 3 ML: .5; 3 SOLUTION RESPIRATORY (INHALATION) at 06:37

## 2024-11-26 RX ADMIN — ONDANSETRON 4 MG: 2 INJECTION INTRAMUSCULAR; INTRAVENOUS at 12:37

## 2024-11-26 RX ADMIN — HYDROMORPHONE HYDROCHLORIDE 0.5 MG: 1 INJECTION, SOLUTION INTRAMUSCULAR; INTRAVENOUS; SUBCUTANEOUS at 08:11

## 2024-11-26 RX ADMIN — PANTOPRAZOLE SODIUM 40 MG: 40 TABLET, DELAYED RELEASE ORAL at 08:20

## 2024-11-26 RX ADMIN — TIZANIDINE 2 MG: 4 TABLET ORAL at 13:32

## 2024-11-26 RX ADMIN — ONDANSETRON 4 MG: 2 INJECTION INTRAMUSCULAR; INTRAVENOUS at 18:02

## 2024-11-26 RX ADMIN — ONDANSETRON 4 MG: 2 INJECTION INTRAMUSCULAR; INTRAVENOUS at 05:50

## 2024-11-26 ASSESSMENT — COGNITIVE AND FUNCTIONAL STATUS - GENERAL
STANDING UP FROM CHAIR USING ARMS: 2 - A LOT
WALKING IN HOSPITAL ROOM: 2 - A LOT
MOVING TO AND FROM BED TO CHAIR: 2 - A LOT
CLIMB 3 TO 5 STEPS WITH RAILING: 2 - A LOT

## 2024-11-26 NOTE — ASSESSMENT & PLAN NOTE
This was diagnosed at the [facility] by pleural fluid cytology.  The patient was evaluated at the [facility] was advised that they could offer no cancer directed therapy.  Patient was transitioned to comfort care today.

## 2024-11-26 NOTE — PROGRESS NOTES
Palliative Care Progress Note    Patient Name: Arielle Forresterwer  Patient MRN: [id number]  Date / Time: 11/26/2024 3:08 PM   Attending Physician: Kush Castañeda Jr.*    This is Hospital Day: 4    Interval History (Subjective)    Source: daughters, chart review,  patient, nurse, hospice and primary medical teams    Patient seen awake, alert oriented X3, vomiting, visibly uncomfortable, c/o 10/10 left rib pain and dyspnea.     Current Medications:  Current Facility-Administered Medications   Medication Dose Route Frequency Provider Last Rate Last Admin    acetaminophen (TYLENOL) 650 mg/20.3 mL solution 975 mg  975 mg oral q8h SPENCER Flower Guallpa CRNP   975 mg at 11/26/24 1417    acetylcysteine (MUCOMYST) 200 mg/mL (20 %) solution 200 mg  200 mg nebulization BID (8a, 8p) Cortez Abbott, DO   200 mg at 11/26/24 0820    [Provider Managed Hold] amLODIPine (NORVASC) tablet 10 mg  10 mg oral q AM Jhon Abbottin, DO   10 mg at 11/23/24 1344    bisacodyL (DULCOLAX) 10 mg suppository 10 mg  10 mg rectal Daily PRN Flower Guallpa CRNP        clopidogreL (PLAVIX) tablet 75 mg  75 mg oral Daily Kush Castañeda Jr., MD   75 mg at 11/26/24 0820    glucose chewable tablet 16-32 g of dextrose  16-32 g of dextrose oral PRN Scar, Cortez, DO        Or    dextrose 40 % oral gel 15-30 g of dextrose  15-30 g of dextrose oral PRN Scar Cortez, DO        Or    glucagon (GLUCAGEN) injection 1 mg  1 mg intramuscular PRN Scar, Cortez, DO        Or    dextrose 50 % in water (D50) injection 12.5 g  25 mL intravenous PRN Scar, Cortez, DO        glucose chewable tablet 16-32 g of dextrose  16-32 g of dextrose oral PRN Scar, Cortez, DO        Or    dextrose 40 % oral gel 15-30 g of dextrose  15-30 g of dextrose oral PRN Scar, Cortez, DO        Or    glucagon (GLUCAGEN) injection 1 mg  1 mg intramuscular PRN Scar, Cortez, DO        Or    dextrose 50 % in water (D50) injection 12.5 g  25 mL  intravenous PRN Cortez Abbott, DO        enoxaparin (LOVENOX) syringe 40 mg  40 mg subcutaneous Daily (6p) Cortez Abbott, DO   40 mg at 11/25/24 1810    ethacrynic acid (EDECRIN) tablet 25 mg  25 mg oral BID Kush Castañeda Jr., MD   25 mg at 11/26/24 0820    glycopyrrolate (ROBINUL) injection 0.2 mg  0.2 mg intravenous q4h PRN Flower Guallpa CRNP        HYDROmorphone (DILAUDID) injection 1 mg  1 mg intravenous q2h PRN Flower Guallpa CRNP        ipratropium-albuteroL (DUO-NEB) 0.5-2.5 mg/3 mL nebulizer solution 3 mL  3 mL nebulization q6h SPENCER Cortez Abbott, DO   3 mL at 11/26/24 0637    labetaloL (NORMODYNE,TRANDATE) injection 10 mg  10 mg intravenous q6h PRN Cortez Abbott, DO   10 mg at 11/25/24 1246    levothyroxine (SYNTHROID) tablet 100 mcg  100 mcg oral Daily Cortez Abbott, DO   100 mcg at 11/26/24 0521    LORazepam (ATIVAN) injection 1 mg  1 mg intravenous q4h PRN Flower Guallpa CRNP        macitentan (OPSUMIT) tablet 10 mg - PT OWN MED  10 mg oral Daily Cortez Abbott, DO   10 mg at 11/26/24 0820    mouth moisturizer (TOOTHETTE) cream   mucous membrane q2h PRN Flower Guallpa CRNP        ondansetron ODT (ZOFRAN-ODT) disintegrating tablet 4 mg  4 mg oral q6h INT Flower Guallpa CRNP        Or    ondansetron (ZOFRAN) injection 4 mg  4 mg intravenous q6h INT Flower Guallpa CRNP   4 mg at 11/26/24 1237    pantoprazole (PROTONIX) tablet,delayed release (DR/EC) 40 mg  40 mg oral Daily Cortez Abbott, DO   40 mg at 11/26/24 0820    sildenafiL (pulm.hypertension) (REVATIO) tablet 20 mg  20 mg oral TID Cortez Abbott, DO   20 mg at 11/26/24 1332    sodium chloride (OCEAN) 0.65 % nasal spray 2 spray  2 spray Each Nostril 3x daily PRN Gerard Quach, DO   2 spray at 11/26/24 0205    sodium chloride PF flush 3 mL  3 mL intravenous PRN Flower Guallpa CRNP        sucralfate (CARAFATE) tablet 1 g  1 g oral BID AC Kush Castañeda Jr., MD   1  g at 11/26/24 0910    tiZANidine (ZANAFLEX) tablet 2 mg  2 mg oral q6h PRN Cortez Abbott DO   2 mg at 11/26/24 1332       Objective    Physical Exam:  General:   Moderate Distress  Eyes:  Sclera Anicteric  ENMT:  Mucus Membranes Dry  CV:  Rate  tachycardic  Lungs:  Respiratory Rate  Tachypneic  Abdomen:  soft, non-tender  Psych:  Appropriate, Cooperative, and anxious  Neuro:  Awake, Alert, and Oriented  person, place, and situation  Skin:  no rash    Vitals:  Vitals:    11/26/24 1037   BP: 138/63   Pulse: 94   Resp: 16   Temp: 37.7 °C (99.9 °F)   SpO2: 95%       Intake & Output:  I/O last 3 completed shifts:  In: 1200 [P.O.:1200]  Out: 2375 [Urine:1575; Other:800]    Laboratory Studies:    CBC Results         11/26/24 11/25/24 11/24/24     0836 1252 0349    WBC 7.64 6.92 7.85    RBC 3.58 4.03 3.62    HGB 10.9 12.1 10.9    HCT 35.2 39.4 35.0    MCV 98.3 97.8 96.7    MCH 30.4 30.0 30.1    MCHC 31.0 30.7 31.1     181 202       CMP Results         11/26/24 11/25/24 11/24/24     0836 1252 0349     141 141    K 4.1 4.1 3.8    Cl 108 107 108    CO2 26 28 26    Glucose 85 91 92    BUN 15 13 14    Creatinine 1.0 0.8 1.1    Calcium 9.0 9.3 8.8    Anion Gap 7 6 7    AST -- 13 --    ALT -- 5 --    Albumin -- 3.6 --    EGFR >60.0 >60.0 56.2       Troponin I Results         11/25/24 11/25/24 11/23/24     1354 1252 1059    HS Troponin I 14.1 11.7 13.4     ABG Results         11/25/24 11/23/24 04/13/23     1311 0908 1949    pH 7.37 -- --    pCO2 45 -- --    pO2 145 -- --    HCO3 25 -- --    Base Excess 0.4 -- --    Source Of Oxygen -- nonrebreather nc           Labs reviewed-significant for mild anemia    Imaging and Other Studies:     Radiology Imaging    XR CHEST 1 VW    Narrative  CLINICAL HISTORY: Chest pain, nonspecific    COMPARISON: 11/23/2024    COMMENT:    TECHNIQUE: Single frontal view of the chest was performed.    LINES/TUBES/HARDWARE: None    LUNGS AND PLEURA: Redemonstrated dense consolidation in the  left lower lobe.  Patchy airspace opacities throughout the bilateral lungs on a background of  interstitial edema. Small-moderate left pleural effusion. No pneumothorax.  Overall, findings not significantly changed from prior study.    CARDIOMEDIASTINUM: Cardiomediastinal silhouette appears unchanged.    SKELETON/OTHER: No acute osseous abnormality.    Impression  IMPRESSION:  Patchy multifocal airspace opacities with vascular and interstitial prominence.  This could either represent patchy pulmonary edema, a multifocal pneumonia or a  combination of both. Overall findings not significantly changed from prior  study.                                                                      Cardiac Imaging    TRANSTHORACIC ECHO (TTE) LIMITED 07/19/2024    Interpretation Summary  Rhythm is sinus tachycardia.    Mild increased wall thickness with normal left ventricular cavity and preserved systolic function. EF 65%. No wall motion abnormalities.  Normal right ventricular size with preserved systolic function.  IVC normal in size with >50% respiratory variation consistent with normal right sided filling pressures.  Small pericardial effusion. No evidence of hemodynamic compromise with normal respiratory variation and no chamber collapse. Prominent epicardial fat.  Compared with previous study of 5/23/2024, small pericardial effusion persists.       I have independently reviewed the pertinent imaging to the time of note and agree with reported results.      Assessment:  Emotional distress  Assessment & Plan  - appreciate spiritual care   - appreciate PCSW    - patient with significant distress as she understands that end of life is near.     Cancer associated pain  Assessment & Plan  Patient severe pain this morning despite receiving 8 doses of dilaudid 0.5 mg IVP in 24 hours as well as Tylenol and Tizanidine as needed    - Discontinue Butrans patch as patient reports that it has been ineffective  Patient now receiving  comfort focused care, see recs below:   Recommend  -Increasing Tylenol to 975 mg oral solution every 8 hours ATC  -Continue tizanidine 2 mg q6hr prn  -Increased Dilaudid to 1 mg IVP every 2 hours as needed as last resort as the patient values being awake and alert  -Zofran 4 mg IVP every 6 hours ATC   -Ativan 1 mg IVP every 4 hours as needed for anxiety  -Glycopyrrolate 0.2 mg IVP every 4 hours as needed      Palliative care by specialist  Assessment & Plan  64 year old with metastatic lung adenocarcinoma, ILD, scleroderma, and PH presenting with worsening dyspnea.    - goals are life are comfort focused, DNR/DNI-see ACP note 11/26  - the patient understands and has been informed that she has a life limiting illness and potential treatment would cause more harm than good.   - patient is able to make complex medical decisions but often defers decision making to her two daughters   - Patient is hospice appropriate    Plan-hospice following    Debility  Assessment & Plan  - significant debility in the setting of scleroderma, ILD, and metastatic malignancy, pleural effusion s/p thoracentesis was briefly on Bipap.   - lives at home alone with 24 hour HHA services,  requires frequent assistance from family  -patient values her independence and prefers to go from bed to commode with assistance, has decline janeth or derek  - high risk for further decline             KARYNA Pop  Office [phone]

## 2024-11-26 NOTE — PROGRESS NOTES
Palliative Care Social Work  Progress Note    Arielle CASANDRA Isidro       Summary of visit: 90 minutes of bedside support provided to patient 1330 - 1530  PCSW provided bedside deep breathing and guided meditation support for Arielle for anxiety relief  Peter also present for bedside prayers. PCSW will remain available for ongoing support.     Recommendation/Follow up plan:   - See ACP note from KARYNA Guallpa - plan is comfort directed care     EVANGELINA Lino   11/26/2024

## 2024-11-26 NOTE — PROGRESS NOTES
HF/PH Cardiology   Progress Note     Assessment/Plan   ASSESSMENT AND RECOMMENDATIONS      Pulmonary Hypertension (WHO Group I, NYHA/WHO FC III)   Systemic sclerosis, Raynaud's Disease, interstitial lung disease  Hypertension  Nonobstructive coronary artery disease  Concern for aspiration, awaiting SLP consult    S/p left thoracentesis for 800 mLs with IR on 11/25. Will need to continue to discuss Pleur-x catheter given that she is not being offered treatment for her Lung AdenoCA and will have near certain likelihood of recurrence: She has declined Pleur-x to date.   Please wean oxygen to the lowest flow necessary to maintain a saturation of 88%  Continue uninterrupted Sildenafil 20 mg TID and Macitentan 10 mg daily  Would resume Clopidogrel.   Continue home Ethacrynic Acid dosing       Subjective     SUBJECTIVE     Interval History: Seen and examined sitting up in bed. Tearful. She reports headache and nausea. She reports that her breathing improved since procedure yesterday. She reports upper abdominal pain. She currently denies light-headedness.     Consult Background from 11/23:   Arielle Fleix is a 64 y.o. female with a PMHx as below presenting in the setting of worsening dyspnea and found to have worsening left pleural effusion.  This was known by examination and a Tuesday office visit with Dr. Arvizu, the patient did not want to miss her oncology appointment in subsequent appointments, so she chose to not have that addressed at that time.  Unfortunately she became acutely more dyspneic last night, which prompted her to come to the ER.    She is tachycardic and on oxygen.  Imaging is consistent with a large left pleural effusion.        Objective   OBJECTIVE     Vitals and Physical Exam    Visit Vitals  BP (!) 140/72 (BP Location: Left upper arm, Patient Position: Lying)   Pulse 94   Temp 36.4 °C (97.6 °F) (Temporal)   Resp 18   Wt 49 kg (108 lb)   LMP  (LMP Unknown)   SpO2 96%   BMI 17.43 kg/m²      BP Readings from Last 5 Encounters:   11/26/24 (!) 140/72   11/19/24 120/72   10/03/24 128/76   09/03/24 120/79   08/05/24 (!) 148/75       Intake/Output Summary (Last 24 hours) at 11/26/2024 0951  Last data filed at 11/26/2024 0600  Gross per 24 hour   Intake 480 ml   Output 1625 ml   Net -1145 ml     I/O last 3 completed shifts:  In: 1200 [P.O.:1200]  Out: 2375 [Urine:1575; Other:800]  Weights (last 7 days)       Date/Time Weight    11/25/24 0434 49 kg (108 lb)    11/24/24 0347 48.7 kg (107 lb 6.4 oz)           Body mass index is 17.43 kg/m².  Body surface area is 1.51 meters squared.    Physical Exam  Neck:      Vascular: No carotid bruit or JVD.   Cardiovascular:      Rate and Rhythm: Regular rhythm. Tachycardia present.      Pulses: Normal pulses.      Heart sounds: Murmur (1/6 CANDIS) heard.   Pulmonary:      Effort: Pulmonary effort is normal. No respiratory distress.      Breath sounds: Normal breath sounds.   Musculoskeletal:      Right lower leg: No edema.      Left lower leg: No edema.      Comments: Bilateral tip of the fingers erosion   Skin:     General: Skin is warm and dry.      Comments: Gauze wrapped around ankle.   Thick facial and limb skin.   Neurological:      General: No focal deficit present.      Mental Status: She is alert and oriented to person, place, and time.         Diagnostic Data    Imaging  IR THORACENTESIS    Result Date: 11/25/2024  IMPRESSION: Successful ultrasound-guided left thoracentesis with 800 mL removed and discarded. I certify that I have personally reviewed this examination and agree with Amira Cortez's report. Fuad Feliciano M.D.    X-RAY CHEST 1 VIEW    Result Date: 11/25/2024  IMPRESSION: Patchy multifocal airspace opacities with vascular and interstitial prominence. This could either represent patchy pulmonary edema, a multifocal pneumonia or a combination of both. Overall findings not significantly changed from prior study.     CT CHEST WITHOUT IV CONTRAST    Result  Date: 11/23/2024  IMPRESSION: 1. Worsening mucous plugging/aspiration in the left lung with complete collapse left lower lobe and increasing subsegmental atelectasis left upper lobe superimposed upon mild background CHF and pulmonary emphysema/fibrosis. At least partially loculated moderate left pleural effusion is slightly larger, potentially malignant given increasing areas of peripheral pleural parenchymal nodularity, though this is indeterminate in the setting of additional airspace opacity suspicious for aspiration pneumonia (asymmetric increased interlobular septal thickening/edema changes in the left lung may potentially also represent lymphangitic spread of neoplasm). Recommend close clinical and imaging follow-up; ultimately, repeat PET/CT is likely necessary. 2. Incompletely evaluated renal hyperdensities, for which nonemergent outpatient enhanced abdominal MRI is advised.    X-RAY CHEST 1 VIEW    Result Date: 11/23/2024  IMPRESSION: Please see comment. Recommend follow-up radiographs or chest CT in 6-8 weeks.      --    History    Past Medical History    Past Medical History:   Diagnosis Date    At risk for falls     De Quervain's tenosynovitis     Essential (primary) hypertension     Follicular carcinoma of thyroid (CMS/HCC)     Gastro-esophageal reflux     Hand ulceration (CMS/HCC)     Hyperlipidemia, unspecified     Interstitial lung disease (CMS/HCC)     Leg ulcer (CMS/HCC)     Lung nodule     Lupus     O2 dependent     On 2L    Osteomyelitis of hand (CMS/HCC)     Osteoporosis     Pulmonary hypertension (CMS/HCC)     PVD (peripheral vascular disease) (CMS/HCC)     Raynaud's disease     Severe    Reflux esophagitis     Scleroderma (CMS/HCC)     Screening mammogram for breast cancer 05/04/2021    BI-RADS category: 1 - Negative (care everywhere @ [facility])    Vertigo     Wound, open, foot     bilateral,dressing change with medihoney and mupirocin ointment by homecare nurse       Family History    Family  History   Problem Relation Name Age of Onset    Heart disease Biological Brother          stent    Breast cancer Niece      Cervical cancer Neg Hx      Uterine cancer Neg Hx      Colon cancer Neg Hx      Ovarian cancer Neg Hx         Social History    Social History     Socioeconomic History    Marital status:    Tobacco Use    Smoking status: Former     Current packs/day: 0.00     Types: Cigarettes     Quit date: 9/15/2005     Years since quittin.2    Smokeless tobacco: Never    Tobacco comments:     Would smoke 1/2 of 1/2 PPD, quit in    Vaping Use    Vaping status: Never Used   Substance and Sexual Activity    Alcohol use: Never    Drug use: Never    Sexual activity: Not Currently     Birth control/protection: Post-menopausal     Social Drivers of Health     Food Insecurity: No Food Insecurity (2024)    Hunger Vital Sign     Worried About Running Out of Food in the Last Year: Never true     Ran Out of Food in the Last Year: Never true   Transportation Needs: No Transportation Needs (2024)    PRAPARE - Transportation     Lack of Transportation (Medical): No     Lack of Transportation (Non-Medical): No   Housing Stability: Low Risk  (2024)    Housing Stability Vital Sign     Unable to Pay for Housing in the Last Year: No     Number of Times Moved in the Last Year: 0     Homeless in the Last Year: No       Allergies  Aspirin, Ibuprofen, Iodine, Iodine and iodide containing products, Morphine, Penicillins, Sulfa (sulfonamide antibiotics), Clindamycin, Hydrochlorothiazide, Oxycodone, Sulfamethoxazole-trimethoprim, and Latex    Medications    Inpatient  Current Facility-Administered Medications   Medication Dose Route Frequency    acetaminophen  650 mg oral q6h PRN    acetylcysteine  200 mg nebulization BID (8a, 8p)    [Provider Managed Hold] amLODIPine  10 mg oral q AM    atorvastatin  40 mg oral Daily (6p)    buprenorphine  1 patch transdermal q7 days    cholecalciferol (vitamin D3)   25 mcg oral Daily    clopidogreL  75 mg oral Daily    glucose  16-32 g of dextrose oral PRN    Or    dextrose  15-30 g of dextrose oral PRN    Or    glucagon  1 mg intramuscular PRN    Or    dextrose 50 % in water (D50)  25 mL intravenous PRN    glucose  16-32 g of dextrose oral PRN    Or    dextrose  15-30 g of dextrose oral PRN    Or    glucagon  1 mg intramuscular PRN    Or    dextrose 50 % in water (D50)  25 mL intravenous PRN    enoxaparin  40 mg subcutaneous Daily (6p)    ethacrynic acid  25 mg oral BID    HYDROmorphone  0.5 mg intravenous q2h PRN    ipratropium-albuteroL  3 mL nebulization q6h SPENCER    labetalol  10 mg intravenous q6h PRN    levothyroxine  100 mcg oral Daily    LORazepam  0.5 mg intravenous q6h PRN    macitentan  10 mg oral Daily    ondansetron ODT  4 mg oral q8h PRN    Or    ondansetron  4 mg intravenous q8h PRN    pantoprazole  40 mg oral Daily    sildenafiL (pulm.hypertension)  20 mg oral TID    sodium chloride  2 spray Each Nostril 3x daily PRN    sucralfate  1 g oral BID AC    tiZANidine  2 mg oral q6h PRN       Home   No current facility-administered medications on file prior to encounter.     Current Outpatient Medications on File Prior to Encounter   Medication Sig Dispense Refill    amLODIPine (NORVASC) 5 mg tablet Take 10 mg by mouth every morning.      atorvastatin (LIPITOR) 40 mg tablet Take 1 tablet (40 mg total) by mouth daily. 90 tablet 3    benzonatate (TESSALON) 100 mg capsule Take 100 mg by mouth 3 (three) times a day as needed for cough.      biotin 1 mg tablet Take 1,000 mcg by mouth daily.      buprenorphine (BUTRANS) 5 mcg/hour patch weekly Place 1 patch on the skin every (seven) 7 days.      cholecalciferol, vitamin D3, 1,000 unit (25 mcg) tablet Take 1,000 Units by mouth daily.      clopidogreL (PLAVIX) 75 mg tablet Take 1 tablet (75 mg total) by mouth daily. 90 tablet 3    collagenase (SantyL) ointment Apply 1 Application topically daily.      cyanocobalamin, vitamin  B-12, 1,000 mcg capsule Take 1,000 mcg by mouth daily.      cyclobenzaprine (FLEXERIL) 5 mg tablet Take 1 tablet (5 mg total) by mouth 3 (three) times a day as needed for muscle spasms (/ chest pain). 90 tablet 0    docusate sodium (COLACE) 100 mg capsule Take 100 mg by mouth daily as needed for constipation.      gabapentin (NEURONTIN) 100 mg capsule Take 2 capsules (200 mg total) by mouth 3 (three) times a day. 180 capsule 0    honey (MEDIHONEY) 100 % paste Apply topically daily. 15 mL 0    hydrocortisone 1 % cream Apply to affected area 2 times daily 15 g 0    levothyroxine (SYNTHROID) 100 mcg tablet Take 100 mcg by mouth daily. BRAND ONLY      lidocaine (ASPERCREME) 4 % adhesive patch,medicated topical patch Apply 1 patch topically daily as needed (pain). Remove & discard patch within 12 hours or as directed by prescriber. 30 patch 0    loperamide (IMODIUM) 2 mg capsule Take 2 mg by mouth every 8 (eight) hours as needed for diarrhea.      meclizine (ANTIVERT) 25 mg tablet Take 25 mg by mouth daily as needed.      MEDIHONEY, HONEY, TOP Apply topically daily.      mupirocin (BACTROBAN) 2 % ointment Apply 1 application. topically daily. Behind ears      ondansetron (ZOFRAN) 4 mg tablet Take 1 tablet (4 mg total) by mouth every 8 (eight) hours as needed for nausea or vomiting for up to 7 days. 21 tablet 0    OPSUMIT 10 mg tablet tablet Take 1 tablet (10 mg total) by mouth daily. 30 tablet 0    pantoprazole (PROTONIX) 40 mg EC tablet Take 1 tablet (40 mg total) by mouth daily Indications: gastroesophageal reflux disease. 30 tablet 0    polyethylene glycol (MIRALAX) 17 gram packet Take 17 g by mouth 2 (two) times a day as needed (constipation) for up to 5 days. 10 each 0    sildenafiL, pulm.hypertension, (REVATIO) 20 mg tablet Take 1 tablet (20 mg total) by mouth 3 (three) times a day. 270 tablet 3    sodium chloride (OCEAN) 0.65 % nasal spray Administer 2 sprays into each nostril daily.      tiZANidine (ZANAFLEX) 2  mg tablet Take 2 mg by mouth every 6 (six) hours as needed for muscle spasms.      tocilizumab (ACTEMRA) 200 mg/10 mL (20 mg/mL) solution Infuse 8 mg/kg into a venous catheter every 28 (twentyeight) days.

## 2024-11-26 NOTE — ACP (ADVANCE CARE PLANNING)
Palliative Care Advance Care Planning Note      Patient Name: Arielle Felix                                                                                        Patient MRN: [id number]  Discussion Date: 11/26/24   Discussion Participants: patient, daughters,  Cheryl and ANTIONE Mittal     Today participants wished to discuss Advance Care Planning. The following summarizes our discussion.    During our visit today, we discussed:     Start Time 1:30 PM   End Time 3: 00 PM         Goals of Care  Patient is moderate distress this morning, c/o sever left rib pain, dyspnea, nausea and vomiting.  Patient attributing vomiting to receiving too many medications.  Patient's children expressed their understanding that there medical management has reached it limits given cancer directed treatment will likely result in more harm than good.  Give this acknowledgement, the family has decided to pursue comfort focused care.  This discussion was initially had with patient's daughters and afterward with patient and her daughter Addie.      Ms. Felix was asked if she wanted to have her symptoms managed here in the hospital or at home to which she expressed the desire to be home with hospice care.     Attestation Statement:  I have spent a total of 90 minutes in face-to-face discussion of patient advance care planning with the patient and/or surrogate decision makers.  No active management of the patient’s problem(s) was undertaken during the time period reported      KARYNA Pop  11/26/2024 3:13 PM

## 2024-11-26 NOTE — PROGRESS NOTES
[facility] Medicine     Daily Progress Note                SUBJECTIVE   Interval History: I saw the patient twice today, the first time at approximately 8:30 AM.  At that time the patient reported nausea, vomiting, and rib pain.  She was treated with IV ondansetron, IV lorazepam, and IV hydromorphone.  Later in the day the patient and her family met with the palliative care nurse practitioner, and at that time the patient and her family decided to change her comfort care.  The patient expressed her preference for home hospice.When I saw her in the early afternoon after this decision was made, she had multiple family members in the room and she appeared more relaxed and comfortable.     OBJECTIVE      Vital signs in last 24 hours:  Temp:  [36.4 °C (97.6 °F)-37.7 °C (99.9 °F)] 37.7 °C (99.9 °F)  Heart Rate:  [] 94  Resp:  [16-20] 16  BP: (109-157)/(57-78) 138/63    Intake/Output Summary (Last 24 hours) at 11/26/2024 1811  Last data filed at 11/26/2024 0811  Gross per 24 hour   Intake 240 ml   Output 825 ml   Net -585 ml         PHYSICAL EXAMINATION      Physical Exam  Constitutional:       Comments: In the morning the patient was in distress due to nausea and vomiting.  In the afternoon she appeared comfortable.   Neurological:      Comments: She is awake, alert, and responsive.            LINES, CATHETERS, DRAINS, AIRWAYS, AND WOUNDS   Lines, Drains, and Airways:  Wounds (agree with documentation and present on admission):  Peripheral IV (Adult) 11/23/24 Anterior;Right;Upper Arm (Active)   Number of days: 3       Peripheral IV (Adult) 11/25/24 Right Antecubital (Active)   Number of days: 1       Wound Pressure Injury Right Buttock (Active)   Number of days: 3         Comments:    LABS / IMAGING / TELE      Labs  CBC Results         11/26/24 11/25/24 11/24/24     0836 1252 0349    WBC 7.64 6.92 7.85    RBC 3.58 4.03 3.62    HGB 10.9 12.1 10.9    HCT 35.2 39.4 35.0    MCV 98.3 97.8 96.7    MCH 30.4 30.0 30.1     [facility] 31.0 30.7 31.1     181 202           BMP Results         11/26/24 11/25/24 11/24/24     0836 1252 0349     141 141    K 4.1 4.1 3.8    Cl 108 107 108    CO2 26 28 26    Glucose 85 91 92    BUN 15 13 14    Creatinine 1.0 0.8 1.1    Calcium 9.0 9.3 8.8    Anion Gap 7 6 7    EGFR >60.0 >60.0 56.2           Comment for EGFR at 0836 on 11/26/24: Calculation based on the Chronic Kidney Disease Epidemiology Collaboration (CKD-EPI) equation refit without adjustment for race.    Comment for EGFR at 1252 on 11/25/24: Calculation based on the Chronic Kidney Disease Epidemiology Collaboration (CKD-EPI) equation refit without adjustment for race.    Comment for EGFR at 0349 on 11/24/24: Calculation based on the Chronic Kidney Disease Epidemiology Collaboration (CKD-EPI) equation refit without adjustment for race.             ASSESSMENT AND PLAN        Assessment & Plan  Acute on chronic respiratory failure with hypoxia (CMS/HCC)   Patient with lung cancer with malignant pleural effusion, underlying pulmonary hypertension, interstitial lung disease and HFpEF. CT Chest showed evidence of worsening L pleural effusion, mucous plugging and debris concerning for aspiration.  She underwent left thoracentesis with removal of 800 mL of fluid.  The palliative care nurse practitioner met with the patient and her daughters, at which time the decision was made to transition her to comfort care, with a plan for home hospice.  Plan is to continue acetylcysteine nebs, ipratropium-albuterol nebs, buprenorphine patch, ethacrynic acid, acetaminophen as needed, tizanidine as needed, IV hydromorphone as needed.  Of note, patient is allergic to morphine and most diuretics.  She does tolerate hydromorphone and ethacrynic acid.  Anticipate discharge to home hospice as soon as that necessary preparations have been made.  Malignant pleural effusion  Status post left thoracentesis yesterday.  Patient was transitioned to comfort care  today.  Adenocarcinoma, lung (CMS/HCC)  This was diagnosed at the [facility] by pleural fluid cytology.  The patient was evaluated at the [facility] was advised that they could offer no cancer directed therapy.  Patient was transitioned to comfort care today.  Interstitial lung disease (CMS/HCC)  Failed mycophenolate outpatient per notes.  Pulmonary hypertension (CMS/HCC)  Cardiology consultation appreciated.  Continue sildenafil/macitentan.  Chronic heart failure with preserved ejection fraction (CMS/HCC)  Cardiology consultation appreciated.  Continue ethacrynic acid.  Hypothyroid  Continue levothyroxine.  Scleroderma (CMS/HCC)  Noted.  Peripheral arterial disease (CMS/HCC)  Continue atorvastatin and clopidogrel.  GERD (gastroesophageal reflux disease)  Continue pantoprazole and start sucralfate.  Essential (primary) hypertension  Blood pressure is adequately controlled off her usual amlodipine.  Plan is to continue holding amlodipine.    VTE Assessment: Pad[place]    VTE Prophylaxis:  Current anticoagulants:    None      Code Status: DNR (A.N.D.)      Estimated Discharge Date: 11/29/2024   Disposition Planning:          Kush Castañeda Jr, MD  11/26/2024

## 2024-11-26 NOTE — ASSESSMENT & PLAN NOTE
Blood pressure is adequately controlled off her usual amlodipine.  Plan is to continue holding amlodipine.

## 2024-11-26 NOTE — CONSULTS
Podiatric Surgery Consult Note    Subjective      Arielle Felix is a 64 y.o. female who was admitted for acute respiratory failure with hypoxia, pleural effusion.  Medicine team had family meeting on 11/25 with palliative care, at that meeting the patient and her family changed CODE STATUS to DNR/DNI.  Patient was consulted for evaluation and assistance in management of chronic foot wounds.  On evaluation bedside, patient is very lethargic this morning.  Patient states she does have mild pain from time to time in her bilateral feet.      Review of Systems:   Constitutional: Lethargic  Gastrointestinal: Negative for nausea and vomiting.   Musculoskeletal: Mild tenderness to digits bilaterally  Skin: Bilateral second digit wound.      Medical History:   Past Medical History:   Diagnosis Date    At risk for falls     De Quervain's tenosynovitis     Essential (primary) hypertension     Follicular carcinoma of thyroid (CMS/HCC)     Gastro-esophageal reflux     Hand ulceration (CMS/HCC)     Hyperlipidemia, unspecified     Interstitial lung disease (CMS/HCC)     Leg ulcer (CMS/HCC)     Lung nodule     Lupus     O2 dependent     On 2L    Osteomyelitis of hand (CMS/HCC)     Osteoporosis     Pulmonary hypertension (CMS/HCC)     PVD (peripheral vascular disease) (CMS/HCC)     Raynaud's disease     Severe    Reflux esophagitis     Scleroderma (CMS/HCC)     Screening mammogram for breast cancer 05/04/2021    BI-RADS category: 1 - Negative (care everywhere @ [place])    Vertigo     Wound, open, foot     bilateral,dressing change with medihoney and mupirocin ointment by homecare nurse       Surgical History:   Past Surgical History   Procedure Laterality Date    ANGIOGRAM/ANGIOPLASTY FEMORAL , POSS. STENT/COIL/FEM-POP Bilateral 8/14/2024    Performed by Rashel Richards MD FACS at [facility] [place]    Cardiac catheterization      Colonoscopy      Hernia repair      ION Navigational Bronchoscopy with Radial Probe and Fluoroscopy + EBUS  "N/A 2024    Performed by Joel Rodriguez MD at [facility] [place]    RIGHT HEART CATH N/A 2022    Performed by Timothy Arvizu DO at [facility] [place] CARDIAC CATH/EP    Thyroidectomy         Social History:   Social History     Socioeconomic History    Marital status:    Tobacco Use    Smoking status: Former     Current packs/day: 0.00     Types: Cigarettes     Quit date: 9/15/2005     Years since quittin.2    Smokeless tobacco: Never    Tobacco comments:     Would smoke 1/2 of 1/2 PPD, quit in    Vaping Use    Vaping status: Never Used   Substance and Sexual Activity    Alcohol use: Never    Drug use: Never    Sexual activity: Not Currently     Birth control/protection: Post-menopausal     Social Drivers of Health     Food Insecurity: No Food Insecurity (2024)    Hunger Vital Sign     Worried About Running Out of Food in the Last Year: Never true     Ran Out of Food in the Last Year: Never true   Transportation Needs: No Transportation Needs (2024)    PRAPARE - Transportation     Lack of Transportation (Medical): No     Lack of Transportation (Non-Medical): No   Housing Stability: Low Risk  (2024)    Housing Stability Vital Sign     Unable to Pay for Housing in the Last Year: No     Number of Times Moved in the Last Year: 0     Homeless in the Last Year: No       Family History:   Family History   Problem Relation Name Age of Onset    Heart disease Biological Brother          stent    Breast cancer Niece      Cervical cancer Neg Hx      Uterine cancer Neg Hx      Colon cancer Neg Hx      Ovarian cancer Neg Hx         Allergies:   Allergies   Allergen Reactions    Aspirin Shortness of breath     Other reaction(s): Unknown  \"stop breathing\"      Ibuprofen Shortness of breath     Stop breathing    Iodine Shortness of breath     Other reaction(s): Unknown    Iodine And Iodide Containing Products Shortness of breath     ? rash    Morphine Shortness of breath      Reported wamrth of face, improved with " benadryl and cold compresses after getting morphine as well as episode of (subjective ) dyspnea, and thought she passed out - did have wamrth and erythema of face with mild edema R>L cheek notably on exam.     Penicillins Shortness of breath     Other reaction(s): Unknown    Sulfa (Sulfonamide Antibiotics) Angioedema    Clindamycin     Hydrochlorothiazide      Other reaction(s): Abdominal Pain   Slow heart rate    Oxycodone      Other reaction(s): Vomiting    Sulfamethoxazole-Trimethoprim      Other reaction(s): Vomiting, Weakness     Latex Rash       Home Medications:   acetaminophen (TYLENOL) tablet 650 mg  650 mg oral q6h PRN    acetylcysteine (MUCOMYST) 200 mg/mL (20 %) solution 200 mg  200 mg nebulization BID (8a, 8p)    [Provider Managed Hold] amLODIPine (NORVASC) tablet 10 mg  10 mg oral q AM    atorvastatin (LIPITOR) tablet 40 mg  40 mg oral Daily (6p)    buprenorphine (BUTRANS) 5 mcg/hour patch 1 patch  1 patch transdermal q7 days    cholecalciferol (vitamin D3) tablet 25 mcg  25 mcg oral Daily    clopidogreL (PLAVIX) tablet 75 mg  75 mg oral Daily    glucose chewable tablet 16-32 g of dextrose  16-32 g of dextrose oral PRN    Or    dextrose 40 % oral gel 15-30 g of dextrose  15-30 g of dextrose oral PRN    Or    glucagon (GLUCAGEN) injection 1 mg  1 mg intramuscular PRN    Or    dextrose 50 % in water (D50) injection 12.5 g  25 mL intravenous PRN    glucose chewable tablet 16-32 g of dextrose  16-32 g of dextrose oral PRN    Or    dextrose 40 % oral gel 15-30 g of dextrose  15-30 g of dextrose oral PRN    Or    glucagon (GLUCAGEN) injection 1 mg  1 mg intramuscular PRN    Or    dextrose 50 % in water (D50) injection 12.5 g  25 mL intravenous PRN    enoxaparin (LOVENOX) syringe 40 mg  40 mg subcutaneous Daily (6p)    ethacrynic acid (EDECRIN) tablet 25 mg  25 mg oral BID    HYDROmorphone (DILAUDID) injection 0.5 mg  0.5 mg intravenous q2h PRN    ipratropium-albuteroL (DUO-NEB) 0.5-2.5 mg/3 mL nebulizer  solution 3 mL  3 mL nebulization q6h SPENCER    labetaloL (NORMODYNE,TRANDATE) injection 10 mg  10 mg intravenous q6h PRN    levothyroxine (SYNTHROID) tablet 100 mcg  100 mcg oral Daily    LORazepam (ATIVAN) injection 0.5 mg  0.5 mg intravenous q6h PRN    macitentan (OPSUMIT) tablet 10 mg - PT OWN MED  10 mg oral Daily    ondansetron ODT (ZOFRAN-ODT) disintegrating tablet 4 mg  4 mg oral q8h PRN    Or    ondansetron (ZOFRAN) injection 4 mg  4 mg intravenous q8h PRN    pantoprazole (PROTONIX) tablet,delayed release (DR/EC) 40 mg  40 mg oral Daily    sildenafiL (pulm.hypertension) (REVATIO) tablet 20 mg  20 mg oral TID    sodium chloride (OCEAN) 0.65 % nasal spray 2 spray  2 spray Each Nostril 3x daily PRN    sucralfate (CARAFATE) tablet 1 g  1 g oral BID AC    tiZANidine (ZANAFLEX) tablet 2 mg  2 mg oral q6h PRN       Current Medications:  Current Facility-Administered Medications   Medication Dose Route Frequency Provider Last Rate Last Admin    acetaminophen (TYLENOL) tablet 650 mg  650 mg oral q6h PRN Gerard Quach DO   650 mg at 11/26/24 0104    acetylcysteine (MUCOMYST) 200 mg/mL (20 %) solution 200 mg  200 mg nebulization BID (8a, 8p) Cortez Abbott DO   200 mg at 11/26/24 0820    [Provider Managed Hold] amLODIPine (NORVASC) tablet 10 mg  10 mg oral q AM Cortez Abbott DO   10 mg at 11/23/24 1344    atorvastatin (LIPITOR) tablet 40 mg  40 mg oral Daily (6p) Cortez Abbott DO        buprenorphine (BUTRANS) 5 mcg/hour patch 1 patch  1 patch transdermal q7 days Cortez Abbott DO        cholecalciferol (vitamin D3) tablet 25 mcg  25 mcg oral Daily Cortez Abbott DO   25 mcg at 11/26/24 0820    clopidogreL (PLAVIX) tablet 75 mg  75 mg oral Daily Kush Castañeda Jr., MD   75 mg at 11/26/24 0820    glucose chewable tablet 16-32 g of dextrose  16-32 g of dextrose oral PRN Cortez Abbott DO        Or    dextrose 40 % oral gel 15-30 g of dextrose  15-30 g of dextrose oral PRN Cortez Abbott,         Or     glucagon (GLUCAGEN) injection 1 mg  1 mg intramuscular PRN Cortez Abbott, DO        Or    dextrose 50 % in water (D50) injection 12.5 g  25 mL intravenous PRN Cortez Abbott, DO        glucose chewable tablet 16-32 g of dextrose  16-32 g of dextrose oral PRN Cortez Abbott, DO        Or    dextrose 40 % oral gel 15-30 g of dextrose  15-30 g of dextrose oral PRN Cortez Abbott, DO        Or    glucagon (GLUCAGEN) injection 1 mg  1 mg intramuscular PRN Cortez Abbott, DO        Or    dextrose 50 % in water (D50) injection 12.5 g  25 mL intravenous PRN Cortez Abbott, DO        enoxaparin (LOVENOX) syringe 40 mg  40 mg subcutaneous Daily (6p) Cortez Abbott, DO   40 mg at 11/25/24 1810    ethacrynic acid (EDECRIN) tablet 25 mg  25 mg oral BID Kush Castañeda Jr., MD   25 mg at 11/26/24 0820    HYDROmorphone (DILAUDID) injection 0.5 mg  0.5 mg intravenous q2h PRN Kush Castañeda Jr., MD   0.5 mg at 11/26/24 0811    ipratropium-albuteroL (DUO-NEB) 0.5-2.5 mg/3 mL nebulizer solution 3 mL  3 mL nebulization q6h SPENCER Cortez Abbott, DO   3 mL at 11/26/24 0637    labetaloL (NORMODYNE,TRANDATE) injection 10 mg  10 mg intravenous q6h PRN Cortez Abbott, DO   10 mg at 11/25/24 1246    levothyroxine (SYNTHROID) tablet 100 mcg  100 mcg oral Daily Cortez Abbott, DO   100 mcg at 11/26/24 0521    LORazepam (ATIVAN) injection 0.5 mg  0.5 mg intravenous q6h PRN Kush Castañeda Jr., MD   0.5 mg at 11/26/24 0916    macitentan (OPSUMIT) tablet 10 mg - PT OWN MED  10 mg oral Daily Cortez Abbott, DO   10 mg at 11/26/24 0820    ondansetron ODT (ZOFRAN-ODT) disintegrating tablet 4 mg  4 mg oral q8h PRN Michelle Villegas PA C        Or    ondansetron (ZOFRAN) injection 4 mg  4 mg intravenous q8h PRN Michelle Villegas PA C   4 mg at 11/26/24 0550    pantoprazole (PROTONIX) tablet,delayed release (DR/EC) 40 mg  40 mg oral Daily Cortez Abbott DO   40 mg at 11/26/24 0820    sildenafiL (pulm.hypertension) (REVATIO) tablet 20 mg   20 mg oral TID Cortez Abbott DO   20 mg at 11/26/24 0820    sodium chloride (OCEAN) 0.65 % nasal spray 2 spray  2 spray Each Nostril 3x daily PRN Gerard Quach DO   2 spray at 11/26/24 0205    sucralfate (CARAFATE) tablet 1 g  1 g oral BID AC Kush Castañeda Jr., MD   1 g at 11/26/24 0910    tiZANidine (ZANAFLEX) tablet 2 mg  2 mg oral q6h PRN Cortez Abbott DO   2 mg at 11/25/24 1428         Last documented Vital Signs:  Vitals    11/26/24 0700 11/26/24 0700 11/26/24 0820 11/26/24 0908   BP: 133/64  (!) 109/57 (!) 140/72   Pulse: (!) 106 (!) 105 89 94   Resp:    18   Temp: 36.6 °C (97.8 °F)   36.4 °C (97.6 °F)   SpO2: 95%   96%       Labs:  CBC Results         11/26/24 11/25/24 11/24/24     0836 1252 0349    WBC 7.64 6.92 7.85    RBC 3.58 4.03 3.62    HGB 10.9 12.1 10.9    HCT 35.2 39.4 35.0    MCV 98.3 97.8 96.7    MCH 30.4 30.0 30.1    MCHC 31.0 30.7 31.1     181 202          Microbiology Results       Procedure Component Value Units Date/Time    MRSA Screen, Nares Only Nose [[id number]]  (Normal) Collected: 11/23/24 1506    Specimen: Nasal Swab from Nose Updated: 11/23/24 1707     MRSA DNA, Nares Negative    SARS-COV-2 (COVID-19)/ FLU A/B, AND RSV, PCR Nasopharynx [[id number]]  (Normal) Collected: 11/23/24 0909    Specimen: Nasopharyngeal Swab from Nasopharynx Updated: 11/23/24 1042     SARS-CoV-2 (COVID-19) Negative     Influenza A Negative     Influenza B Negative     Respiratory Syncytial Virus Negative    Narrative:      Testing performed using real-time PCR for detection of COVID-19. EUA approved validation studies performed on site.     Blood Culture Blood, Venous [[id number]]  (Normal) Collected: 11/23/24 0908    Specimen: Blood, Venous Updated: 11/25/24 1100     Culture No growth at 48 hours    Blood Culture Blood, Venous [[id number]]  (Normal) Collected: 11/23/24 0908    Specimen: Blood, Venous Updated: 11/25/24 1100     Culture No growth at 48 hours              Imaging:  I have  reviewed the relevant imaging from the last 24 hours    Objective   -Vascular: DP/PT pulses are faintly palpable B/L. PT pulses are palpable B/L.   Negative edema to the foot, ankle and leg bilaterally. Capillary refill time is sluggish bilaterally. Skin temperature is warm to warm from leg to toes B/L.       -Ortho: - active df/pf of ankle. MMT 2/5 in all planes tested.  Redness on palpation to the digits bilateral foot.    -Neuro: Unable to fully assess, however responsive to painful stimuli.    -Derm:   RLE: Partial-thickness wound located to the distal tuft of the second digit.  Wound base is epithelializing currently.  Wound edges intact with no undermining.  No periwound erythema or lymphatic streaking appreciated at this time.  No drainage, no purulence, no fluctuance, no crepitus, no malodor, no probe to deep tissue, no probe to bone.    LLE: Unstageable wound located to the distal tuft of the second digit.  Wound is necrotic with intact wound edges without undermining.  No periwound erythema or lymphatic streaking appreciated at this time.  No drainage, no purulence, no fluctuance, no crepitus, no malodor, no probe to deep tissue, no probe to bone.    No evidence pressure injury to bilateral heels at this time.    See media tab for clinical pictures.       ASSESSMENT/PLAN:  64 y.o. female being consulted for bilateral chronic foot wounds.    -Patient seen, evaluated and treated with all questions and concerns answered and addressed.  -Bilateral foot wounds appear clinically stable, with no clinical SOI at this time.  -Bilateral foot dressings were clean dry and intact upon bedside visit.  -Dressings removed to evaluate.  Xeroform, DSD applied to bilateral second digits.  -Waffle boots ordered for bilateral lower extremity use while in bed.  -Patient may weight-bear as tolerated to bilateral lower extremity.  -Podiatry will follow in house, dressing changes biweekly.  Nursing help in interim  appreciated  -Patient may follow-up outpatient with podiatry upon discharge.  -Rest of care per primary team.  Thank you for this consult!  -Please contact on call podiatry resident with any questions or concerns.         Tod Ricks IV PGY-1  #[id number]

## 2024-11-26 NOTE — PROGRESS NOTES
Followed up with patient, family and the Medical team for goals of care meeting. Patient very distressed this morning. Reported nausea and vomiting. Mentioned she didn't sleep well last night. Offered compassionate calming presence, encouragement, and bedside payer.  Will continue to follow for support.    Rev.Dr.Peter Luna, Spiritual Care Specialist  [phone]

## 2024-11-26 NOTE — ASSESSMENT & PLAN NOTE
Patient with lung cancer with malignant pleural effusion, underlying pulmonary hypertension, interstitial lung disease and HFpEF. CT Chest showed evidence of worsening L pleural effusion, mucous plugging and debris concerning for aspiration.  She underwent left thoracentesis with removal of 800 mL of fluid.  The palliative care nurse practitioner met with the patient and her daughters, at which time the decision was made to transition her to comfort care, with a plan for home hospice.  Plan is to continue acetylcysteine nebs, ipratropium-albuterol nebs, buprenorphine patch, ethacrynic acid, acetaminophen as needed, tizanidine as needed, IV hydromorphone as needed.  Of note, patient is allergic to morphine and most diuretics.  She does tolerate hydromorphone and ethacrynic acid.  Anticipate discharge to home hospice as soon as that necessary preparations have been made.

## 2024-11-26 NOTE — PROGRESS NOTES
Pulmonary  Progress Note       SUBJECTIVE     No acute events overnight reported. Pt seen and examined at bedside. Pt was actively vomiting. Pt states that she has been nauseous all night. Pt is unsure if received anything for nausea. Pt continues to have pain on left side that is improving after thoracentesis.     OBJECTIVE     Vital Signs:     Temp (24hrs), Av.9 °C (98.4 °F), Min:36.4 °C (97.6 °F), Max:37.7 °C (99.9 °F)      Visit Vitals  /63 (BP Location: Left upper arm, Patient Position: Lying)   Pulse 94   Temp 37.7 °C (99.9 °F) (Temporal)   Resp 16   Wt 49 kg (108 lb)   LMP  (LMP Unknown)   SpO2 95%   BMI 17.43 kg/m²     Vitals:    24 0700 24 0820 24 0908 24 1037   BP:  (!) 109/57 (!) 140/72 138/63   BP Location:   Left upper arm Left upper arm   Patient Position:   Lying Lying   Pulse: (!) 105 89 94 94   Resp:   18 16   Temp:   36.4 °C (97.6 °F) 37.7 °C (99.9 °F)   TempSrc:   Temporal Temporal   SpO2:   96% 95%   Weight:             I/O's:    Intake/Output Summary (Last 24 hours) at 2024 1152  Last data filed at 2024 0811  Gross per 24 hour   Intake 720 ml   Output 1625 ml   Net -905 ml         Medications:     acetylcysteine  200 mg nebulization BID (8a, 8p)    [Provider Managed Hold] amLODIPine  10 mg oral q AM    atorvastatin  40 mg oral Daily (6p)    buprenorphine  1 patch transdermal q7 days    cholecalciferol (vitamin D3)  25 mcg oral Daily    clopidogreL  75 mg oral Daily    enoxaparin  40 mg subcutaneous Daily (6p)    ethacrynic acid  25 mg oral BID    ipratropium-albuteroL  3 mL nebulization q6h SPENCER    levothyroxine  100 mcg oral Daily    macitentan  10 mg oral Daily    pantoprazole  40 mg oral Daily    sildenafiL (pulm.hypertension)  20 mg oral TID    sucralfate  1 g oral BID AC              Physical Exam     Physical Exam:   General: NAD, resting comfortably in bed  Cardiac: RRR, no murmurs, rubs and gallops  Respiratory: Decreased breathe sounds  on L  Abdominal: soft, NT  MSK: No LLE erythema, swelling or edema  Neuro: AAOx3  Skin: Scleroderma of skin and 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 Labs of note discussed below:    CBC Results         11/25/24 11/24/24 11/23/24     1252 0349 0908    WBC 6.92 7.85 6.61    RBC 4.03 3.62 4.96    HGB 12.1 10.9 14.8    HCT 39.4 35.0 47.1    MCV 97.8 96.7 95.0    MCH 30.0 30.1 29.8    MCHC 30.7 31.1 31.4     202 238          BMP Results         11/25/24 11/24/24 11/23/24     1252 0349 0908     141 140    K 4.1 3.8 5.3    Cl 107 108 107    CO2 28 26 23    Glucose 91 92 83    BUN 13 14 14    Creatinine 0.8 1.1 1.0    Calcium 9.3 8.8 10.0    Anion Gap 6 7 10    EGFR >60.0 56.2 >60.0           Comment for NA at 0908 on 11/23/24: Moderate hemolysis, results may be affected.     Comment for K at 0908 on 11/23/24: Results obtained on plasma. Plasma Potassium values may be up to 0.4 mEQ/L less than serum values. The differences may be greater for patients with high platelet or white cell counts.  Moderate hemolysis, results may be affected.     Comment for EGFR at 1252 on 11/25/24: Calculation based on the Chronic Kidney Disease Epidemiology Collaboration (CKD-EPI) equation refit without adjustment for race.    Comment for EGFR at 0349 on 11/24/24: Calculation based on the Chronic Kidney Disease Epidemiology Collaboration (CKD-EPI) equation refit without adjustment for race.    Comment for EGFR at 0908 on 11/23/24: Calculation based on the Chronic Kidney Disease Epidemiology Collaboration (CKD-EPI) equation refit without adjustment for race.          Microbiology Results       Procedure Component Value Units Date/Time    MRSA Screen, Nares Only Nose [[id number]]  (Normal) Collected: 11/23/24 1506    Specimen: Nasal Swab from Nose Updated: 11/23/24 1707     MRSA DNA, Nares Negative    SARS-COV-2 (COVID-19)/  FLU A/B, AND RSV, PCR Nasopharynx [[id number]]  (Normal) Collected: 11/23/24 0909    Specimen: Nasopharyngeal Swab from Nasopharynx Updated: 11/23/24 1042     SARS-CoV-2 (COVID-19) Negative     Influenza A Negative     Influenza B Negative     Respiratory Syncytial Virus Negative    Narrative:      Testing performed using real-time PCR for detection of COVID-19. EUA approved validation studies performed on site.     Blood Culture Blood, Venous [[id number]]  (Normal) Collected: 11/23/24 0908    Specimen: Blood, Venous Updated: 11/25/24 1100     Culture No growth at 48 hours    Blood Culture Blood, Venous [[id number]]  (Normal) Collected: 11/23/24 0908    Specimen: Blood, Venous Updated: 11/25/24 1100     Culture No growth at 48 hours          ABG Results         11/25/24 11/23/24 04/13/23     1311 0908 1949    pH 7.37 -- --    pCO2 45 -- --    pO2 145 -- --    HCO3 25 -- --    Base Excess 0.4 -- --    Source Of Oxygen -- nonrebreather nc           Comment for pH at 1311 on 11/25/24: Temperature Corrected pH(T)    Comment for pCO2 at 1311 on 11/25/24: Temperature Corrected pCO2(T)    Comment for pO2 at 1311 on 11/25/24: Temperature Corrected pO2(T)              Imaging:    I have reviewed all pertinent imaging which is discussed below.    IR THORACENTESIS    Result Date: 11/25/2024  CLINICAL HISTORY: Shortness of breath. COMMENT: The patient was referred for ultrasound-guided thoracentesis on the left side.  Informed consent was obtained.  With the patient sitting, ultrasound imaging was performed and a large size pleural effusion was identified.  The fluid was localized and a site was selected on the skin with ultrasound.  The site was marked.  Using sterile technique, under local anesthesia, a 4-Marshallese valved Nuvilexeh catheter was inserted into the pleural space. 800 mL of clear yellow fluid were removed.  The catheter was removed and a sterile dressing was placed over the catheter insertion site. A small pleural effusion  remained post procedure.  This procedure was discontinued prior to complete evacuation of the effusion because no additional fluid could be aspirated.  The fluid was discarded.  The patient tolerated the procedure well. An erect frontal chest film was obtained immediately following the procedure and will be separately reported. This service rendered by Amira Cortez PA-C     IMPRESSION: Successful ultrasound-guided left thoracentesis with 800 mL removed and discarded. I certify that I have personally reviewed this examination and agree with Amira Cortez's report. Fuad Feliciano M.D.    X-RAY CHEST 1 VIEW    Result Date: 11/25/2024  CLINICAL HISTORY: Chest pain, nonspecific COMPARISON: 11/23/2024 COMMENT: TECHNIQUE: Single frontal view of the chest was performed. LINES/TUBES/HARDWARE: None LUNGS AND PLEURA: Redemonstrated dense consolidation in the left lower lobe. Patchy airspace opacities throughout the bilateral lungs on a background of interstitial edema. Small-moderate left pleural effusion. No pneumothorax. Overall, findings not significantly changed from prior study. CARDIOMEDIASTINUM: Cardiomediastinal silhouette appears unchanged. SKELETON/OTHER: No acute osseous abnormality.     IMPRESSION: Patchy multifocal airspace opacities with vascular and interstitial prominence. This could either represent patchy pulmonary edema, a multifocal pneumonia or a combination of both. Overall findings not significantly changed from prior study.         ASSESSMENT AND PLAN     In brief, Arielle Felix is a 64 y.o. female past medical history of Group I pulmonary hypertension secondary to systemic sclerosis, ILD, stage IV lung adenocarcinoma who presented to the ED for worsening shortness of breath.     Impression:  Acute hypoxemic respiratory failure 2/2 recurrent malignant pleural effusion  Stage IV lung adenocarcinoma  ILD 2/2 scleroderma, not in acute exacerbation  Group 1 PH      Recommendations:  - S/p therapeutic  thoracentesis  - Wean supplemental oxygen as tolerated  - Pt declines Pleurx at this time due to infection risk  - Agree with GOC, as per oncologist, no treatment options for stage IV lung adenocarcinoma  - Rest of management per primary team. Note is not complete until attending attestation is complete.         Ginna BragaCarnegie [facility] – [place], [place]Den[place] DO  Pulmonary & Critical Care Fellow, PGY-4  11/26/2024  11:52 AM  Pager: [phone]

## 2024-11-27 PROBLEM — S91.109A OPEN TOE WOUND: Status: ACTIVE | Noted: 2024-11-27

## 2024-11-27 LAB
BACTERIA BLD CULT: NORMAL
BACTERIA BLD CULT: NORMAL

## 2024-11-27 PROCEDURE — 12000000 HC ROOM AND CARE MED/SURG

## 2024-11-27 PROCEDURE — 63700000 HC SELF-ADMINISTRABLE DRUG: Performed by: INTERNAL MEDICINE

## 2024-11-27 PROCEDURE — 25000000 HC PHARMACY GENERAL: Performed by: INTERNAL MEDICINE

## 2024-11-27 PROCEDURE — 63600000 HC DRUGS/DETAIL CODE: Mod: JZ | Performed by: NURSE PRACTITIONER

## 2024-11-27 PROCEDURE — 99233 SBSQ HOSP IP/OBS HIGH 50: CPT | Performed by: INTERNAL MEDICINE

## 2024-11-27 PROCEDURE — 99232 SBSQ HOSP IP/OBS MODERATE 35: CPT | Performed by: INTERNAL MEDICINE

## 2024-11-27 PROCEDURE — 63700000 HC SELF-ADMINISTRABLE DRUG: Performed by: NURSE PRACTITIONER

## 2024-11-27 RX ADMIN — ACETAMINOPHEN 975 MG: 325 TABLET ORAL at 09:29

## 2024-11-27 RX ADMIN — PANTOPRAZOLE SODIUM 40 MG: 40 TABLET, DELAYED RELEASE ORAL at 09:30

## 2024-11-27 RX ADMIN — HYDROMORPHONE HYDROCHLORIDE 1 MG: 1 INJECTION, SOLUTION INTRAMUSCULAR; INTRAVENOUS; SUBCUTANEOUS at 21:28

## 2024-11-27 RX ADMIN — ACETAMINOPHEN 975 MG: 325 TABLET ORAL at 23:16

## 2024-11-27 RX ADMIN — TIZANIDINE 2 MG: 4 TABLET ORAL at 23:16

## 2024-11-27 RX ADMIN — ONDANSETRON 4 MG: 2 INJECTION INTRAMUSCULAR; INTRAVENOUS at 15:48

## 2024-11-27 RX ADMIN — TIZANIDINE 2 MG: 4 TABLET ORAL at 09:29

## 2024-11-27 RX ADMIN — SILDENAFIL 20 MG: 20 TABLET, FILM COATED ORAL at 09:28

## 2024-11-27 RX ADMIN — ACETYLCYSTEINE 200 MG: 200 INHALANT RESPIRATORY (INHALATION) at 09:25

## 2024-11-27 RX ADMIN — CLOPIDOGREL 75 MG: 75 TABLET ORAL at 09:30

## 2024-11-27 RX ADMIN — ETHACRYNIC ACID 25 MG: 25 TABLET ORAL at 09:33

## 2024-11-27 RX ADMIN — TIZANIDINE 2 MG: 4 TABLET ORAL at 15:54

## 2024-11-27 RX ADMIN — IPRATROPIUM BROMIDE AND ALBUTEROL SULFATE 3 ML: .5; 3 SOLUTION RESPIRATORY (INHALATION) at 13:00

## 2024-11-27 RX ADMIN — TIZANIDINE 2 MG: 4 TABLET ORAL at 01:38

## 2024-11-27 RX ADMIN — ANTACID TABLETS 400 MG OF ELEMENTAL CALCIUM: 500 TABLET, CHEWABLE ORAL at 15:47

## 2024-11-27 RX ADMIN — SILDENAFIL 20 MG: 20 TABLET, FILM COATED ORAL at 13:40

## 2024-11-27 RX ADMIN — SALINE NASAL SPRAY 2 SPRAY: 1.5 SOLUTION NASAL at 14:39

## 2024-11-27 RX ADMIN — ONDANSETRON 4 MG: 2 INJECTION INTRAMUSCULAR; INTRAVENOUS at 01:39

## 2024-11-27 RX ADMIN — ETHACRYNIC ACID 25 MG: 25 TABLET ORAL at 20:27

## 2024-11-27 RX ADMIN — HYDROMORPHONE HYDROCHLORIDE 1 MG: 1 INJECTION, SOLUTION INTRAMUSCULAR; INTRAVENOUS; SUBCUTANEOUS at 03:17

## 2024-11-27 RX ADMIN — LEVOTHYROXINE SODIUM 100 MCG: 0.1 TABLET ORAL at 05:24

## 2024-11-27 RX ADMIN — SILDENAFIL 20 MG: 20 TABLET, FILM COATED ORAL at 20:27

## 2024-11-27 RX ADMIN — ONDANSETRON 4 MG: 2 INJECTION INTRAMUSCULAR; INTRAVENOUS at 09:29

## 2024-11-27 RX ADMIN — IPRATROPIUM BROMIDE AND ALBUTEROL SULFATE 3 ML: .5; 3 SOLUTION RESPIRATORY (INHALATION) at 05:24

## 2024-11-27 RX ADMIN — ONDANSETRON 4 MG: 2 INJECTION INTRAMUSCULAR; INTRAVENOUS at 21:14

## 2024-11-27 NOTE — PLAN OF CARE
Plan of Care Review  Plan of Care Reviewed With: patient  Progress: no change  Outcome Evaluation: Patient received AAAOx4 able to communicate wants and needs. Patient a stand and pivot from bed to BSC. Side pain noted, dilaudid administered with effective results. Nausea noted with emesis, zofran administered with effective results. Comfort measures in place. Family present at bedside. Patient denies shortness of breath at this time. Safety measures in place call bell within reach bed alarm set

## 2024-11-27 NOTE — ASSESSMENT & PLAN NOTE
Blood pressure is adequately controlled off her usual amlodipine.  Discontinue amlodipine.  Start labetalol as needed.

## 2024-11-27 NOTE — PLAN OF CARE
Problem: Adult Inpatient Plan of Care  Goal: Plan of Care Review  Outcome: Progressing  Flowsheets (Taken 11/27/2024 3189)  Progress: no change  Outcome Evaluation: Patient AAO*4 and VSS and able to communiacte wants/ needs. Family at bedside. C/O generalized pain, N/V, heartburn, nasal discomfort. Patient and family concerened about DC of Amlodipine and MD notified. Zofran admin with relief. Waffle cushion added for comfort. Wound care nurse changed dressings on both feet. Callbell within reach.  Plan of Care Reviewed With: patient   Plan of Care Review  Plan of Care Reviewed With: patient  Progress: no change  Outcome Evaluation: Patient AAO*4 and VSS and able to communiacte wants/ needs. Family at bedside. C/O generalized pain, N/V, heartburn, nasal discomfort. Patient and family concerened about DC of Amlodipine and MD notified. Zofran admin with relief. Waffle cushion added for comfort. Wound care nurse changed dressings on both feet. Callbell within reach.

## 2024-11-27 NOTE — PROGRESS NOTES
Followed up with pt, and daughter by the bedside. Reported feeling better than yesterday, improved headache and nausea. Offered support conversation, reflective listening and prayer as requested. Will continue to follow for support.     Rev.Dr.Peter Luna, Spiritual Care Specialist  [id number]

## 2024-11-27 NOTE — ASSESSMENT & PLAN NOTE
Patient with lung cancer with malignant pleural effusion, underlying pulmonary hypertension, interstitial lung disease and HFpEF. CT Chest showed evidence of worsening L pleural effusion, mucous plugging and debris concerning for aspiration.  She underwent left thoracentesis with removal of 800 mL of fluid.  The palliative care nurse practitioner met with the patient and her daughters, at which time the decision was made to transition her to comfort care, with a plan for home hospice.  Plan is to continue acetylcysteine nebs, ipratropium-albuterol nebs, buprenorphine patch, ethacrynic acid, scheduled ondansetron, pantoprazole, sucralfate, acetaminophen as needed, glycopyrrolate as needed, lorazepam as needed, prochlorperazine as needed, tizanidine as needed, IV hydromorphone as needed.  Of note, patient is allergic to morphine and most diuretics.  She does tolerate hydromorphone and ethacrynic acid.  The patient's family has expressed a preference for inpatient hospice.  However, the patient does not as yet qualify for this.  The hospice team will reevaluate on  tomorrow.

## 2024-11-27 NOTE — PROGRESS NOTES
"  Hospice Follow Up Progress Note      Patient Name: Arielle Felix                                                                                        Patient MRN: [id number]  Date / Time Note Created: 11/27/2024 2:22 PM  Attending Physician: Kush Castañeda Jr.*  Primary Care Physician: Sara Simmons MD     Reason for Consult:  Hospice Evaluation    Code Status History:  Current Code Status: DNR (A.N.D.)    Confucianist:  Gnosticism    Allergies:  Allergies   Allergen Reactions    Aspirin Shortness of breath     Other reaction(s): Unknown  \"stop breathing\"      Ibuprofen Shortness of breath     Stop breathing    Iodine Shortness of breath     Other reaction(s): Unknown    Iodine And Iodide Containing Products Shortness of breath     ? rash    Morphine Shortness of breath      Reported wamrth of face, improved with benadryl and cold compresses after getting morphine as well as episode of (subjective ) dyspnea, and thought she passed out - did have wamrth and erythema of face with mild edema R>L cheek notably on exam.     Penicillins Shortness of breath     Other reaction(s): Unknown    Sulfa (Sulfonamide Antibiotics) Angioedema    Clindamycin     Hydrochlorothiazide      Other reaction(s): Abdominal Pain   Slow heart rate    Oxycodone      Other reaction(s): Vomiting    Sulfamethoxazole-Trimethoprim      Other reaction(s): Vomiting, Weakness     Latex Rash       Advance Directives:  Emergency Contact: mauricio french; Relationship: Daughter; Wagner Khan; Relationship: Daughter    Plan  Patient is not inpatient hospice LOC appropriate at this time. Patient continues on comfort care. Hospice will continue to assess daily for med adjustments and comfort. We will continue to follow.  Please call with any questions or concerns.    MADDIE ValentineN, RN  Hospice Liaison at [facility]  [facility]  [address] [place]   [address]  Call [phone] 24 hours a " day for questions or concerns

## 2024-11-27 NOTE — PROGRESS NOTES
Palliative Medicine Progress Note    Assessment and Plan      Palliative care by specialist  Assessment & Plan  64 year old with metastatic lung adenocarcinoma, ILD, scleroderma, and PH presenting with worsening dyspnea.    - goals are comfort focused, DNR/DNI, hospice team following to help determine discharge plan  - patient able to make complex medical decisions, two daughters also assist  - discussed with patient and family 11/27, she wants to balance symptom control with her desire to minimize medications and wanting to feel alert    Cancer associated pain  Assessment & Plan  - Butrans discontinued as patient felt it was not helping  - APAP 975 mg tid prn, max 4 g/day  -Continue tizanidine 2 mg q6hr prn  - IV hydromorphone 1 mg q2hr prn      Debility  Assessment & Plan  - significant debility in the setting of scleroderma, ILD, and metastatic malignancy, pleural effusion s/p thoracentesis was briefly on Bipap.   - lives at home alone with 24 hour HHA services,  requires frequent assistance from family  - high risk for further decline              Nando Naik MD          Subjective    Reports moderate pain. No dyspnea at rest. Family at bedside.      Objective      Palliative Performance Score: 30      Physical Exam:  Physical Exam  Constitutional:       General: She is not in acute distress.  HENT:      Head: Atraumatic.      Mouth/Throat:      Mouth: Mucous membranes are dry.   Eyes:      Conjunctiva/sclera: Conjunctivae normal.   Pulmonary:      Effort: No respiratory distress.   Abdominal:      General: There is no distension.   Musculoskeletal:      Comments: cachexia   Psychiatric:         Behavior: Behavior normal.           Vitals:  Vitals:    11/27/24 1517   BP: (!) 142/62   Pulse: 90   Resp: 15   Temp: 36.7 °C (98 °F)   SpO2: 100%       Laboratory Studies:    CBC Results         11/26/24 11/25/24 11/24/24     0836 1252 0349    WBC 7.64 6.92 7.85    RBC 3.58 4.03 3.62    HGB 10.9 12.1 10.9    HCT 35.2  39.4 35.0    MCV 98.3 97.8 96.7    MCH 30.4 30.0 30.1    MCHC 31.0 30.7 31.1     181 202          CMP Results         11/26/24 11/25/24 11/24/24     0836 1252 0349     141 141    K 4.1 4.1 3.8    Cl 108 107 108    CO2 26 28 26    Glucose 85 91 92    BUN 15 13 14    Creatinine 1.0 0.8 1.1    Calcium 9.0 9.3 8.8    Anion Gap 7 6 7    AST -- 13 --    ALT -- 5 --    Albumin -- 3.6 --    EGFR >60.0 >60.0 56.2           Comment for EGFR at 0836 on 11/26/24: Calculation based on the Chronic Kidney Disease Epidemiology Collaboration (CKD-EPI) equation refit without adjustment for race.    Comment for EGFR at 1252 on 11/25/24: Calculation based on the Chronic Kidney Disease Epidemiology Collaboration (CKD-EPI) equation refit without adjustment for race.    Comment for EGFR at 0349 on 11/24/24: Calculation based on the Chronic Kidney Disease Epidemiology Collaboration (CKD-EPI) equation refit without adjustment for race.              Labs reviewed. Notable for cr 1.0    Imaging and Other Studies:     Radiology Imaging    XR CHEST 1 VW    Narrative  CLINICAL HISTORY: Chest pain, nonspecific    COMPARISON: 11/23/2024    COMMENT:    TECHNIQUE: Single frontal view of the chest was performed.    LINES/TUBES/HARDWARE: None    LUNGS AND PLEURA: Redemonstrated dense consolidation in the left lower lobe.  Patchy airspace opacities throughout the bilateral lungs on a background of  interstitial edema. Small-moderate left pleural effusion. No pneumothorax.  Overall, findings not significantly changed from prior study.    CARDIOMEDIASTINUM: Cardiomediastinal silhouette appears unchanged.    SKELETON/OTHER: No acute osseous abnormality.    Impression  IMPRESSION:  Patchy multifocal airspace opacities with vascular and interstitial prominence.  This could either represent patchy pulmonary edema, a multifocal pneumonia or a  combination of both. Overall findings not significantly changed from prior  study.                                                                       Lab Results   Component Value Date    QTCCALCULAT 469 11/25/2024

## 2024-11-27 NOTE — ASSESSMENT & PLAN NOTE
This was diagnosed at the [facility] by pleural fluid cytology.  The patient was evaluated at the [place] [facility] [place] was advised that they could offer no cancer directed therapy.  The patient is now on comfort care.

## 2024-11-27 NOTE — PROGRESS NOTES
Hospital Medicine     Daily Progress Note                SUBJECTIVE   Interval History: The patient reports intermittent nausea and pain at her side.  She is dissatisfied with the state of her foot dressings.     OBJECTIVE      Vital signs in last 24 hours:  Temp:  [36.5 °C (97.7 °F)] 36.5 °C (97.7 °F)  Heart Rate:  [] 81  Resp:  [14] 14  BP: (135)/(61) 135/61  No intake or output data in the 24 hours ending 11/27/24 1329      PHYSICAL EXAMINATION      Physical Exam  Constitutional:       Comments: The patient is awake, alert, and responsive.  She appears uncomfortable due to nausea.            LINES, CATHETERS, DRAINS, AIRWAYS, AND WOUNDS   Lines, Drains, and Airways:  Wounds (agree with documentation and present on admission):  Peripheral IV (Adult) 11/23/24 Anterior;Right;Upper Arm (Active)   Number of days: 4       Peripheral IV (Adult) 11/25/24 Right Antecubital (Active)   Number of days: 2       Wound Pressure Injury Right Buttock (Active)   Number of days: 4         Comments:    LABS / IMAGING / TELE      Labs  No new labs.      Imaging  No new imaging.  Thank    ASSESSMENT AND PLAN        Assessment & Plan  Acute on chronic respiratory failure with hypoxia (CMS/HCC)   Patient with lung cancer with malignant pleural effusion, underlying pulmonary hypertension, interstitial lung disease and HFpEF. CT Chest showed evidence of worsening L pleural effusion, mucous plugging and debris concerning for aspiration.  She underwent left thoracentesis with removal of 800 mL of fluid.  The palliative care nurse practitioner met with the patient and her daughters, at which time the decision was made to transition her to comfort care, with a plan for home hospice.  Plan is to continue acetylcysteine nebs, ipratropium-albuterol nebs, buprenorphine patch, ethacrynic acid, scheduled ondansetron, pantoprazole, sucralfate, acetaminophen as needed, glycopyrrolate as needed, lorazepam as needed, prochlorperazine as  needed, tizanidine as needed, IV hydromorphone as needed.  Of note, patient is allergic to morphine and most diuretics.  She does tolerate hydromorphone and ethacrynic acid.  The patient's family has expressed a preference for inpatient hospice.  However, the patient does not as yet qualify for this.  The hospice team will reevaluate on Friday.  Malignant pleural effusion  Status post left thoracentesis with removal of 800 mL of fluid.  Patient is now on comfort care.  Adenocarcinoma, lung (CMS/HCC)  This was diagnosed at the [facility] by pleural fluid cytology.  The patient was evaluated at the [place] [facility] [place] was advised that they could offer no cancer directed therapy.  The patient is now on comfort care.  Interstitial lung disease (CMS/HCC)  Failed mycophenolate outpatient per notes.  Pulmonary hypertension (CMS/HCC)  Cardiology consultation appreciated.  Continue sildenafil/macitentan.  Chronic heart failure with preserved ejection fraction (CMS/HCC)  Cardiology consultation appreciated.  Continue ethacrynic acid.  Hypothyroid  Continue levothyroxine.  Scleroderma (CMS/HCC)  Noted.  Peripheral arterial disease (CMS/HCC)  Discontinue atorvastatin and clopidogrel, as the patient is now on comfort care.  GERD (gastroesophageal reflux disease)  Continue pantoprazole, calcium carbonate as needed, and sucralfate.  Essential (primary) hypertension  Blood pressure is adequately controlled off her usual amlodipine.  Discontinue amlodipine.  Start labetalol as needed.  Open toe wound  Podiatry consultation appreciated.  Wound care consultation requested.  Continue local wound care.    VTE Assessment: [place]    VTE Prophylaxis:  Current anticoagulants:    None      Code Status: DNR (A.N.D.)      Estimated Discharge Date: 11/29/2024   Disposition Planning: Anticipate discharge to home hospice.          Kush Castañeda Jr, MD  11/27/2024

## 2024-11-27 NOTE — CONSULTS
"Hospice Consult      Patient Name: Arielle Hammondstower                                                                                        Patient MRN: [id number]  Date / Time Note Created: 11/27/2024 9:47 AM  Attending Physician: Kush Castañeda Jr.*  Primary Care Physician: Sara Simmons MD     Code Status History:  Current Code Status: DNR (A.N.D.)    Cheondoism:    Mu-ism    Allergies:  Allergies   Allergen Reactions    Aspirin Shortness of breath     Other reaction(s): Unknown  \"stop breathing\"      Ibuprofen Shortness of breath     Stop breathing    Iodine Shortness of breath     Other reaction(s): Unknown    Iodine And Iodide Containing Products Shortness of breath     ? rash    Morphine Shortness of breath      Reported wamrth of face, improved with benadryl and cold compresses after getting morphine as well as episode of (subjective ) dyspnea, and thought she passed out - did have wamrth and erythema of face with mild edema R>L cheek notably on exam.     Penicillins Shortness of breath     Other reaction(s): Unknown    Sulfa (Sulfonamide Antibiotics) Angioedema    Clindamycin     Hydrochlorothiazide      Other reaction(s): Abdominal Pain   Slow heart rate    Oxycodone      Other reaction(s): Vomiting    Sulfamethoxazole-Trimethoprim      Other reaction(s): Vomiting, Weakness     Latex Rash       Advance Directives:  Emergency Contact: mauricio french; Relationship: Daughter; Wagner Khan; Relationship: Daughter    Past Medical History:  Past Medical History:   Diagnosis Date    At risk for falls     De Quervain's tenosynovitis     Essential (primary) hypertension     Follicular carcinoma of thyroid (CMS/HCC)     Gastro-esophageal reflux     Hand ulceration (CMS/HCC)     Hyperlipidemia, unspecified     Interstitial lung disease (CMS/HCC)     Leg ulcer (CMS/HCC)     Lung nodule     Lupus     O2 dependent     On 2L    Osteomyelitis of hand (CMS/HCC)     Osteoporosis     Pulmonary hypertension " (CMS/HCC)     PVD (peripheral vascular disease) (CMS/HCC)     Raynaud's disease     Severe    Reflux esophagitis     Scleroderma (CMS/[facility])     Screening mammogram for breast cancer 05/04/2021    BI-RADS category: 1 - Negative (care everywhere @ [facility])    Vertigo     Wound, open, foot     bilateral,dressing change with medihoney and mupirocin ointment by homecare nurse       Past Surgical History:  Past Surgical History   Procedure Laterality Date    ANGIOGRAM/ANGIOPLASTY FEMORAL , POSS. STENT/COIL/FEM-POP Bilateral 8/14/2024    Performed by Rashel Richards MD FACS at [facility] [place]    Cardiac catheterization      Colonoscopy      Hernia repair      ION Navigational Bronchoscopy with Radial Probe and Fluoroscopy + EBUS N/A 8/8/2024    Performed by Joel Rodriguez MD at [facility] [place]    RIGHT HEART CATH N/A 11/28/2022    Performed by Timothy Arvizu DO at [facility] [place]ARDIAC [place]/EP    Thyroidectomy         Hospice Assessment:  Current Palliative Performance Status:  30%  Preferred Setting for Care: Routine at home    Plan:  Met with patients daughters, hospice philosophy reviewed. Medications being adjusted to provide optimum comfort when she return home with hospice. We will continue to follow.  Please call with any questions or concerns.    Thank you for the opportunity to participate in this patient's hospital plan of care.      MADDIE ValentineN, RN  Hospice Liaison at [facility]  [address] [place]   [address]  Call [phone] 24 hours a day for questions or concerns

## 2024-11-27 NOTE — CONSULTS
Wound Ostomy Continence Note    Subjective    HPI Patient is a 64 y.o. female who was admitted on 11/23/2024 with a diagnosis of Pleural effusion on left [J90]  Acute respiratory failure with hypoxia (CMS/HCC) [J96.01].    Problem list Principal Problem:    Acute on chronic respiratory failure with hypoxia (CMS/HCC)  Active Problems:    Interstitial lung disease (CMS/HCC)    Essential (primary) hypertension    Pulmonary hypertension (CMS/HCC)    Scleroderma (CMS/HCC)    Chronic heart failure with preserved ejection fraction (CMS/HCC)    Hypothyroid    GERD (gastroesophageal reflux disease)    Peripheral arterial disease (CMS/HCC)    Malignant pleural effusion    Adenocarcinoma, lung (CMS/HCC)    Open toe wound     PMH/PSH Past Medical History:   Diagnosis Date    At risk for falls     De Quervain's tenosynovitis     Essential (primary) hypertension     Follicular carcinoma of thyroid (CMS/HCC)     Gastro-esophageal reflux     Hand ulceration (CMS/HCC)     Hyperlipidemia, unspecified     Interstitial lung disease (CMS/HCC)     Leg ulcer (CMS/HCC)     Lung nodule     Lupus     O2 dependent     On 2L    Osteomyelitis of hand (CMS/HCC)     Osteoporosis     Pulmonary hypertension (CMS/HCC)     PVD (peripheral vascular disease) (CMS/HCC)     Raynaud's disease     Severe    Reflux esophagitis     Scleroderma (CMS/HCC)     Screening mammogram for breast cancer 05/04/2021    BI-RADS category: 1 - Negative (care everywhere @ [facility])    Vertigo     Wound, open, foot     bilateral,dressing change with medihoney and mupirocin ointment by homecare nurse     Past Surgical History   Procedure Laterality Date    ANGIOGRAM/ANGIOPLASTY FEMORAL , POSS. STENT/COIL/FEM-POP Bilateral 8/14/2024    Performed by Rashel Richards MD FACS at [facility] [place]    Cardiac catheterization      Colonoscopy      Hernia repair      ION Navigational Bronchoscopy with Radial Probe and Fluoroscopy + EBUS N/A 8/8/2024    Performed by Joel Rodriguez MD at [facility] [place]     RIGHT HEART CATH N/A 11/28/2022    [place]formed [place]izu [place] [facility] [place]    Thyroidectomy        Assessment and                 Recommendation C/s received for Bilateral foot wounds. Patient is comfort care. Podiatry saw her yesterday, but patient is unhappy with her dressings; seeing pt in room 273, has:  --brown eschar at L tip of 2nd toe   --light brown eschar at R tip of 2nd toe    --no other active wounds/skin loss lesions at both feet, podiatry evlauted at yesterday 11/26/2024;  --fully alert and oriented, on comfort measurement only, palliative following;     At this visit, dressing removed per her request, cleansed and dressing applied for both toes/feet per patient herself preference and instruction from step by step( cleansed toes and both feet with wound cleanser, betadine painted both 2nd toe, half 4x4 dry gauze wrap both toes, half 4x4 border foam over the dry gauze, taped, then kerlrx wrap from toes to ankle; Inform pt and family, wound care specialist is not available 24/7, the dressing change with her preference/instruction can be done by nursing as needed along with comfort care   Otherwise, defer to podiatry's local treatment plan   Sign off, no further recommendation;     Plan of care discussed with pt, family and nursing at bedside and unit;               Date: 11/27/24  Signature: Alyce Bowden RN

## 2024-11-27 NOTE — ASSESSMENT & PLAN NOTE
Podiatry consultation appreciated.  Wound care consultation requested.  Continue local wound care.

## 2024-11-27 NOTE — PROGRESS NOTES
HF/PH Cardiology   Progress Note     Assessment/Plan   ASSESSMENT AND RECOMMENDATIONS      Pulmonary Hypertension (WHO Group I, NYHA/WHO FC III)   Systemic sclerosis, Raynaud's Disease, interstitial lung disease  Hypertension  Nonobstructive coronary artery disease  Concern for aspiration, awaiting SLP consult    Appropriate transition to Comfort-Focused GOC noted and discussed with patient  S/p left thoracentesis for 800 mLs with IR on 11/25.   Oxygen titrated to comfort  Sildenafil, Macitentan and Ethacrynic Acid likely help with comfort  Can stop Clopidogrel.      Subjective     SUBJECTIVE     Interval History: Seen and examined sitting up in bed with her daughter at bedside. She reports improved headache and nausea. She reports that her breathing is okay at rest. She reports that she is upset by the wrapping of the gangrene / wounds on her feet. She currently denies chest pain.     Consult Background from 11/23:   Arielle Felix is a 64 y.o. female with a PMHx as below presenting in the setting of worsening dyspnea and found to have worsening left pleural effusion.  This was known by examination and a Tuesday office visit with Dr. Arvizu, the patient did not want to miss her oncology appointment in subsequent appointments, so she chose to not have that addressed at that time.  Unfortunately she became acutely more dyspneic last night, which prompted her to come to the ER.    She is tachycardic and on oxygen.  Imaging is consistent with a large left pleural effusion.        Objective   OBJECTIVE     Vitals and Physical Exam    Visit Vitals  /61   Pulse 81   Temp 36.5 °C (97.7 °F) (Axillary)   Resp 14   Wt 49 kg (108 lb)   LMP  (LMP Unknown)   SpO2 99%   BMI 17.43 kg/m²     BP Readings from Last 5 Encounters:   11/27/24 135/61   11/19/24 120/72   10/03/24 128/76   09/03/24 120/79   08/05/24 (!) 148/75     No intake or output data in the 24 hours ending 11/27/24 1123    I/O last 3 completed  shifts:  In: 240 [P.O.:240]  Out: 575 [Urine:575]  Weights (last 7 days)       Date/Time Weight    11/25/24 0434 49 kg (108 lb)    11/24/24 0347 48.7 kg (107 lb 6.4 oz)           Body mass index is 17.43 kg/m².  Body surface area is 1.51 meters squared.    Physical Exam  Neck:      Vascular: No carotid bruit or JVD.   Cardiovascular:      Rate and Rhythm: Regular rhythm. Tachycardia present.      Pulses: Normal pulses.      Heart sounds: Murmur (1/6 CANDIS) heard.   Pulmonary:      Effort: Pulmonary effort is normal. No respiratory distress.      Breath sounds: Normal breath sounds.   Musculoskeletal:      Right lower leg: No edema.      Left lower leg: No edema.      Comments: Bilateral tip of the fingers erosion   Skin:     General: Skin is warm and dry.      Comments: Gauze wrapped around ankle.   Thick facial and limb skin.   Neurological:      General: No focal deficit present.      Mental Status: She is alert and oriented to person, place, and time.         Diagnostic Data    Imaging  IR THORACENTESIS    Result Date: 11/25/2024  IMPRESSION: Successful ultrasound-guided left thoracentesis with 800 mL removed and discarded. I certify that I have personally reviewed this examination and agree with Amira Cortez's report. Fuad Feliciano M.D.    X-RAY CHEST 1 VIEW    Result Date: 11/25/2024  IMPRESSION: Patchy multifocal airspace opacities with vascular and interstitial prominence. This could either represent patchy pulmonary edema, a multifocal pneumonia or a combination of both. Overall findings not significantly changed from prior study.     CT CHEST WITHOUT IV CONTRAST    Result Date: 11/23/2024  IMPRESSION: 1. Worsening mucous plugging/aspiration in the left lung with complete collapse left lower lobe and increasing subsegmental atelectasis left upper lobe superimposed upon mild background CHF and pulmonary emphysema/fibrosis. At least partially loculated moderate left pleural effusion is slightly larger,  potentially malignant given increasing areas of peripheral pleural parenchymal nodularity, though this is indeterminate in the setting of additional airspace opacity suspicious for aspiration pneumonia (asymmetric increased interlobular septal thickening/edema changes in the left lung may potentially also represent lymphangitic spread of neoplasm). Recommend close clinical and imaging follow-up; ultimately, repeat PET/CT is likely necessary. 2. Incompletely evaluated renal hyperdensities, for which nonemergent outpatient enhanced abdominal MRI is advised.    X-RAY CHEST 1 VIEW    Result Date: 11/23/2024  IMPRESSION: Please see comment. Recommend follow-up radiographs or chest CT in 6-8 weeks.      --    History    Past Medical History    Past Medical History:   Diagnosis Date   • At risk for falls    • De Quervain's tenosynovitis    • Essential (primary) hypertension    • Follicular carcinoma of thyroid (CMS/HCC)    • Gastro-esophageal reflux    • Hand ulceration (CMS/HCC)    • Hyperlipidemia, unspecified    • Interstitial lung disease (CMS/HCC)    • Leg ulcer (CMS/HCC)    • Lung nodule    • Lupus    • O2 dependent     On 2L   • Osteomyelitis of hand (CMS/HCC)    • Osteoporosis    • Pulmonary hypertension (CMS/HCC)    • PVD (peripheral vascular disease) (CMS/HCC)    • Raynaud's disease     Severe   • Reflux esophagitis    • Scleroderma (CMS/HCC)    • Screening mammogram for breast cancer 05/04/2021    BI-RADS category: 1 - Negative (care everywhere @ [facility])   • Vertigo    • Wound, open, foot     bilateral,dressing change with medihoney and mupirocin ointment by homecare nurse       Family History    Family History   Problem Relation Name Age of Onset   • Heart disease Biological Brother          stent   • Breast cancer Niece     • Cervical cancer Neg Hx     • Uterine cancer Neg Hx     • Colon cancer Neg Hx     • Ovarian cancer Neg Hx         Social History    Social History     Socioeconomic History   • Marital  status:    Tobacco Use   • Smoking status: Former     Current packs/day: 0.00     Types: Cigarettes     Quit date: 9/15/2005     Years since quittin.2   • Smokeless tobacco: Never   • Tobacco comments:     Would smoke 1/2 of 1/2 PPD, quit in    Vaping Use   • Vaping status: Never Used   Substance and Sexual Activity   • Alcohol use: Never   • Drug use: Never   • Sexual activity: Not Currently     Birth control/protection: Post-menopausal     Social Drivers of Health     Food Insecurity: No Food Insecurity (2024)    Hunger Vital Sign    • Worried About Running Out of Food in the Last Year: Never true    • Ran Out of Food in the Last Year: Never true   Transportation Needs: No Transportation Needs (2024)    PRAPARE - Transportation    • Lack of Transportation (Medical): No    • Lack of Transportation (Non-Medical): No   Housing Stability: Low Risk  (2024)    Housing Stability Vital Sign    • Unable to Pay for Housing in the Last Year: No    • Number of Times Moved in the Last Year: 0    • Homeless in the Last Year: No       Allergies  Aspirin, Ibuprofen, Iodine, Iodine and iodide containing products, Morphine, Penicillins, Sulfa (sulfonamide antibiotics), Clindamycin, Hydrochlorothiazide, Oxycodone, Sulfamethoxazole-trimethoprim, and Latex    Medications    Inpatient  Current Facility-Administered Medications   Medication Dose Route Frequency   • acetaminophen  975 mg oral q8h PRN   • acetylcysteine  200 mg nebulization BID (8a, 8p)   • [Provider Managed Hold] amLODIPine  10 mg oral q AM   • bisacodyL  10 mg rectal Daily PRN   • calcium (as carbonate)  400 mg of elemental calcium oral 3x daily PRN   • clopidogreL  75 mg oral Daily   • glucose  16-32 g of dextrose oral PRN    Or   • dextrose  15-30 g of dextrose oral PRN    Or   • glucagon  1 mg intramuscular PRN    Or   • dextrose 50 % in water (D50)  25 mL intravenous PRN   • glucose  16-32 g of dextrose oral PRN    Or   • dextrose   15-30 g of dextrose oral PRN    Or   • glucagon  1 mg intramuscular PRN    Or   • dextrose 50 % in water (D50)  25 mL intravenous PRN   • ethacrynic acid  25 mg oral BID   • glycopyrrolate  0.2 mg intravenous q4h PRN   • HYDROmorphone  1 mg intravenous q2h PRN   • ipratropium-albuteroL  3 mL nebulization q6h SPENCER   • labetalol  10 mg intravenous q6h PRN   • levothyroxine  100 mcg oral Daily   • LORazepam  1 mg intravenous q4h PRN   • macitentan  10 mg oral Daily   • mouth moisturizer   mucous membrane q2h PRN   • ondansetron ODT  4 mg oral q6h INT    Or   • ondansetron  4 mg intravenous q6h INT   • pantoprazole  40 mg oral Daily   • prochlorperazine  5 mg intravenous q6h PRN   • sildenafiL (pulm.hypertension)  20 mg oral TID   • sodium chloride  2 spray Each Nostril 3x daily PRN   • sodium chloride PF  3 mL intravenous PRN   • sucralfate  1 g oral BID AC   • tiZANidine  2 mg oral q6h PRN       Home   No current facility-administered medications on file prior to encounter.     Current Outpatient Medications on File Prior to Encounter   Medication Sig Dispense Refill   • amLODIPine (NORVASC) 5 mg tablet Take 10 mg by mouth every morning.     • atorvastatin (LIPITOR) 40 mg tablet Take 1 tablet (40 mg total) by mouth daily. 90 tablet 3   • benzonatate (TESSALON) 100 mg capsule Take 100 mg by mouth 3 (three) times a day as needed for cough.     • biotin 1 mg tablet Take 1,000 mcg by mouth daily.     • buprenorphine (BUTRANS) 5 mcg/hour patch weekly Place 1 patch on the skin every (seven) 7 days.     • cholecalciferol, vitamin D3, 1,000 unit (25 mcg) tablet Take 1,000 Units by mouth daily.     • clopidogreL (PLAVIX) 75 mg tablet Take 1 tablet (75 mg total) by mouth daily. 90 tablet 3   • collagenase (SantyL) ointment Apply 1 Application topically daily.     • cyanocobalamin, vitamin B-12, 1,000 mcg capsule Take 1,000 mcg by mouth daily.     • cyclobenzaprine (FLEXERIL) 5 mg tablet Take 1 tablet (5 mg total) by mouth 3  (three) times a day as needed for muscle spasms (/ chest pain). 90 tablet 0   • docusate sodium (COLACE) 100 mg capsule Take 100 mg by mouth daily as needed for constipation.     • gabapentin (NEURONTIN) 100 mg capsule Take 2 capsules (200 mg total) by mouth 3 (three) times a day. 180 capsule 0   • honey (MEDIHONEY) 100 % paste Apply topically daily. 15 mL 0   • hydrocortisone 1 % cream Apply to affected area 2 times daily 15 g 0   • levothyroxine (SYNTHROID) 100 mcg tablet Take 100 mcg by mouth daily. BRAND ONLY     • lidocaine (ASPERCREME) 4 % adhesive patch,medicated topical patch Apply 1 patch topically daily as needed (pain). Remove & discard patch within 12 hours or as directed by prescriber. 30 patch 0   • loperamide (IMODIUM) 2 mg capsule Take 2 mg by mouth every 8 (eight) hours as needed for diarrhea.     • meclizine (ANTIVERT) 25 mg tablet Take 25 mg by mouth daily as needed.     • MEDIHONEY, HONEY, TOP Apply topically daily.     • mupirocin (BACTROBAN) 2 % ointment Apply 1 application. topically daily. Behind ears     • ondansetron (ZOFRAN) 4 mg tablet Take 1 tablet (4 mg total) by mouth every 8 (eight) hours as needed for nausea or vomiting for up to 7 days. 21 tablet 0   • OPSUMIT 10 mg tablet tablet Take 1 tablet (10 mg total) by mouth daily. 30 tablet 0   • pantoprazole (PROTONIX) 40 mg EC tablet Take 1 tablet (40 mg total) by mouth daily Indications: gastroesophageal reflux disease. 30 tablet 0   • polyethylene glycol (MIRALAX) 17 gram packet Take 17 g by mouth 2 (two) times a day as needed (constipation) for up to 5 days. 10 each 0   • sildenafiL, pulm.hypertension, (REVATIO) 20 mg tablet Take 1 tablet (20 mg total) by mouth 3 (three) times a day. 270 tablet 3   • sodium chloride (OCEAN) 0.65 % nasal spray Administer 2 sprays into each nostril daily.     • tiZANidine (ZANAFLEX) 2 mg tablet Take 2 mg by mouth every 6 (six) hours as needed for muscle spasms.     • tocilizumab (ACTEMRA) 200 mg/10 mL  (20 mg/mL) solution Infuse 8 mg/kg into a venous catheter every 28 (twentyeight) days.

## 2024-11-27 NOTE — ASSESSMENT & PLAN NOTE
Patient with lung cancer with malignant pleural effusion, underlying pulmonary hypertension, interstitial lung disease and HFpEF. CT Chest showed evidence of worsening L pleural effusion, mucous plugging and debris concerning for aspiration.  She underwent left thoracentesis with removal of 800 mL of fluid.  The palliative care nurse practitioner met with the patient and her daughters, at which time the decision was made to transition her to comfort care, with a plan for home hospice.  Plan is to continue acetylcysteine nebs, ipratropium-albuterol nebs, buprenorphine patch, ethacrynic acid, scheduled ondansetron, pantoprazole, sucralfate, acetaminophen as needed, glycopyrrolate as needed, lorazepam as needed, prochlorperazine as needed, tizanidine as needed, IV hydromorphone as needed.  Of note, patient is allergic to morphine and most diuretics.  She does tolerate hydromorphone and ethacrynic acid.  The patient's family has expressed a preference for inpatient hospice.  However, the patient does not as yet qualify for this.  The hospice team will reevaluate on Friday.

## 2024-11-28 PROCEDURE — 63700000 HC SELF-ADMINISTRABLE DRUG: Performed by: INTERNAL MEDICINE

## 2024-11-28 PROCEDURE — 63600000 HC DRUGS/DETAIL CODE: Mod: JZ | Performed by: INTERNAL MEDICINE

## 2024-11-28 PROCEDURE — 63700000 HC SELF-ADMINISTRABLE DRUG: Performed by: NURSE PRACTITIONER

## 2024-11-28 PROCEDURE — 25000000 HC PHARMACY GENERAL: Performed by: INTERNAL MEDICINE

## 2024-11-28 PROCEDURE — 99232 SBSQ HOSP IP/OBS MODERATE 35: CPT | Performed by: INTERNAL MEDICINE

## 2024-11-28 PROCEDURE — 63600000 HC DRUGS/DETAIL CODE: Mod: JZ | Performed by: NURSE PRACTITIONER

## 2024-11-28 PROCEDURE — 12000000 HC ROOM AND CARE MED/SURG

## 2024-11-28 RX ORDER — DOCUSATE SODIUM 100 MG/1
100 CAPSULE, LIQUID FILLED ORAL 2 TIMES DAILY
Status: DISCONTINUED | OUTPATIENT
Start: 2024-11-28 | End: 2024-11-30 | Stop reason: HOSPADM

## 2024-11-28 RX ORDER — ONDANSETRON HYDROCHLORIDE 2 MG/ML
4 INJECTION, SOLUTION INTRAVENOUS ONCE
Status: COMPLETED | OUTPATIENT
Start: 2024-11-28 | End: 2024-11-28

## 2024-11-28 RX ADMIN — TIZANIDINE 2 MG: 4 TABLET ORAL at 18:06

## 2024-11-28 RX ADMIN — ACETYLCYSTEINE 200 MG: 200 INHALANT RESPIRATORY (INHALATION) at 09:55

## 2024-11-28 RX ADMIN — HYDROMORPHONE HYDROCHLORIDE 1 MG: 1 INJECTION, SOLUTION INTRAMUSCULAR; INTRAVENOUS; SUBCUTANEOUS at 22:42

## 2024-11-28 RX ADMIN — SILDENAFIL 20 MG: 20 TABLET, FILM COATED ORAL at 13:35

## 2024-11-28 RX ADMIN — ONDANSETRON 4 MG: 2 INJECTION INTRAMUSCULAR; INTRAVENOUS at 09:49

## 2024-11-28 RX ADMIN — ANTACID TABLETS 400 MG OF ELEMENTAL CALCIUM: 500 TABLET, CHEWABLE ORAL at 18:17

## 2024-11-28 RX ADMIN — ONDANSETRON 4 MG: 2 INJECTION INTRAMUSCULAR; INTRAVENOUS at 23:04

## 2024-11-28 RX ADMIN — SILDENAFIL 20 MG: 20 TABLET, FILM COATED ORAL at 09:55

## 2024-11-28 RX ADMIN — ACETAMINOPHEN 975 MG: 325 TABLET ORAL at 13:36

## 2024-11-28 RX ADMIN — TIZANIDINE 2 MG: 4 TABLET ORAL at 09:54

## 2024-11-28 RX ADMIN — DOCUSATE SODIUM 100 MG: 100 CAPSULE, LIQUID FILLED ORAL at 19:39

## 2024-11-28 RX ADMIN — ETHACRYNIC ACID 25 MG: 25 TABLET ORAL at 09:55

## 2024-11-28 RX ADMIN — PANTOPRAZOLE SODIUM 40 MG: 40 TABLET, DELAYED RELEASE ORAL at 09:55

## 2024-11-28 RX ADMIN — ONDANSETRON 4 MG: 2 INJECTION INTRAMUSCULAR; INTRAVENOUS at 06:06

## 2024-11-28 RX ADMIN — LEVOTHYROXINE SODIUM 100 MCG: 0.1 TABLET ORAL at 06:06

## 2024-11-28 RX ADMIN — ONDANSETRON 4 MG: 2 INJECTION INTRAMUSCULAR; INTRAVENOUS at 18:06

## 2024-11-28 NOTE — PROGRESS NOTES
Hospital Medicine     Daily Progress Note                SUBJECTIVE   Interval History: The patient had an episode of nausea this morning relieved by an additional dose of ondansetron.  When I saw her today she reported some pain, but she declines IV hydromorphone at this time.     OBJECTIVE      Vital signs in last 24 hours:  Temp:  [36.4 °C (97.6 °F)-37.1 °C (98.7 °F)] 36.4 °C (97.6 °F)  Heart Rate:  [78-90] 80  Resp:  [15-16] 16  BP: (128-142)/(60-62) 128/60    Intake/Output Summary (Last 24 hours) at 11/28/2024 1415  Last data filed at 11/28/2024 0800  Gross per 24 hour   Intake 10 ml   Output --   Net 10 ml         PHYSICAL EXAMINATION      Physical Exam  Constitutional:       Comments: The patient is awake, alert, conversant, and is not in distress, sitting up in bed.            LINES, CATHETERS, DRAINS, AIRWAYS, AND WOUNDS   Lines, Drains, and Airways:  Wounds (agree with documentation and present on admission):  Peripheral IV (Adult) 11/23/24 Anterior;Right;Upper Arm (Active)   Number of days: 5       Peripheral IV (Adult) 11/25/24 Right Antecubital (Active)   Number of days: 3       Wound Pressure Injury Right Buttock (Active)   Number of days: 5         Comments:    LABS / IMAGING / TELE      Labs  No new labs.      Imaging  No new imaging.      ASSESSMENT AND PLAN        Assessment & Plan  Acute on chronic respiratory failure with hypoxia (CMS/HCC)   Patient with lung cancer with malignant pleural effusion, underlying pulmonary hypertension, interstitial lung disease and HFpEF. CT Chest showed evidence of worsening L pleural effusion, mucous plugging and debris concerning for aspiration.  She underwent left thoracentesis with removal of 800 mL of fluid.  The palliative care nurse practitioner met with the patient and her daughters, at which time the decision was made to transition her to comfort care, with a plan for home hospice.  Plan is to continue acetylcysteine nebs, ipratropium-albuterol nebs,  buprenorphine patch, ethacrynic acid, scheduled ondansetron, pantoprazole, sucralfate, acetaminophen as needed, glycopyrrolate as needed, lorazepam as needed, prochlorperazine as needed, tizanidine as needed, IV hydromorphone as needed.  Of note, patient is allergic to morphine and most diuretics.  She does tolerate hydromorphone and ethacrynic acid.  The patient's family has expressed a preference for inpatient hospice.  However, the patient does not as yet qualify for this.  The hospice team will reevaluate on  tomorrow.  Malignant pleural effusion  Status post left thoracentesis with removal of 800 mL of fluid.  Patient is now on comfort care.  Adenocarcinoma, lung (CMS/HCC)  This was diagnosed at the [facility] by pleural fluid cytology.  The patient was evaluated at the [place] [facility] [place] was advised that they could offer no cancer directed therapy.  The patient is now on comfort care.  Interstitial lung disease (CMS/HCC)  Failed mycophenolate outpatient per notes.  Pulmonary hypertension (CMS/HCC)  Cardiology consultation appreciated.  Continue sildenafil/macitentan.  Chronic heart failure with preserved ejection fraction (CMS/HCC)  Cardiology consultation appreciated.  Continue ethacrynic acid.  Hypothyroid  Continue levothyroxine.  Scleroderma (CMS/HCC)  Noted.  Peripheral arterial disease (CMS/HCC)  Discontinue atorvastatin and clopidogrel, as the patient is now on comfort care.  GERD (gastroesophageal reflux disease)  Continue pantoprazole, calcium carbonate as needed, and sucralfate.  Essential (primary) hypertension  Blood pressure is adequately controlled off her usual amlodipine.  Discontinue amlodipine.   Continue labetalol as needed.  Open toe wound  Podiatry consultation appreciated.  Wound care consultation requested.  Continue local wound care.    VTE Assessment: [place]    VTE Prophylaxis:  Current anticoagulants:    None      Code Status: DNR (A.N.D.)      Estimated Discharge  Date: 11/29/2024   Disposition Planning: Anticipate discharge to home hospice versus inpatient hospice.          Kush Castañeda Jr, MD  11/28/2024

## 2024-11-28 NOTE — PLAN OF CARE
Plan of Care Review  Plan of Care Reviewed With: patient  Progress: no change  Outcome Evaluation: Patient AAOx4 able to communicate wants and needs. Nausea noted with emesis, zofran administered with effective results. Patient noted side pain, dilaudid administered. Tylenol and zanaflex administered with effective results. Patient family present at bedside and supportive of patient. Patient a stand and pivot to BSC. Patient repositioned for comfort. Comfort measures in place. Patient requested blood pressure to be taken. Patient educated on comfort measures. Patient verbalized understanding, however still requested BP to be taken. No shortness of breath noted at this time. Safety measures in place call bell within reach bed alarm set

## 2024-11-28 NOTE — ASSESSMENT & PLAN NOTE
Blood pressure is adequately controlled off her usual amlodipine.  Discontinue amlodipine.   Continue labetalol as needed.

## 2024-11-28 NOTE — PLAN OF CARE
Plan of Care Review  Plan of Care Reviewed With: patient  Progress: no change  Outcome Evaluation: Patient is continue with comfort care measures. Nausea and pain meds given with good effect. Poor appetite. No SOB.

## 2024-11-28 NOTE — ASSESSMENT & PLAN NOTE
This was diagnosed at the [facility] by pleural fluid cytology.  The patient was evaluated at the [place] [facility] [place] was advised that they could offer no cancer directed therapy.  The patient is now on comfort care.

## 2024-11-28 NOTE — PROGRESS NOTES
Spiritual care visited with patient and her daughter and grandsons. Her daughter and grandsons had spent overnight (and several previous nights) in the room, and were waiting for another daughter to come so they could go home. Patient asked for prayer, and gathered family around her bedside. Spiritual care offered prayer, reflective listening and supportive conversation to patient and family, and spoke to adult caregiver about self-care during her mother's illness. Patient and her daughter expressed appreciation for visit, prayer and compassionate support. Spiritual care will continue to be available. - Keshia Hammer,  Intern, x [phone]

## 2024-11-29 PROCEDURE — 99233 SBSQ HOSP IP/OBS HIGH 50: CPT | Performed by: INTERNAL MEDICINE

## 2024-11-29 PROCEDURE — 63600000 HC DRUGS/DETAIL CODE: Mod: JZ | Performed by: INTERNAL MEDICINE

## 2024-11-29 PROCEDURE — 25000000 HC PHARMACY GENERAL: Performed by: INTERNAL MEDICINE

## 2024-11-29 PROCEDURE — 63700000 HC SELF-ADMINISTRABLE DRUG: Performed by: NURSE PRACTITIONER

## 2024-11-29 PROCEDURE — 12000000 HC ROOM AND CARE MED/SURG

## 2024-11-29 PROCEDURE — 99232 SBSQ HOSP IP/OBS MODERATE 35: CPT | Performed by: INTERNAL MEDICINE

## 2024-11-29 PROCEDURE — 63700000 HC SELF-ADMINISTRABLE DRUG: Performed by: INTERNAL MEDICINE

## 2024-11-29 PROCEDURE — 63600000 HC DRUGS/DETAIL CODE: Mod: JZ | Performed by: NURSE PRACTITIONER

## 2024-11-29 RX ORDER — ONDANSETRON HYDROCHLORIDE 2 MG/ML
4 INJECTION, SOLUTION INTRAVENOUS ONCE
Status: COMPLETED | OUTPATIENT
Start: 2024-11-29 | End: 2024-11-29

## 2024-11-29 RX ORDER — HYDROMORPHONE HYDROCHLORIDE 1 MG/ML
0.5 INJECTION, SOLUTION INTRAMUSCULAR; INTRAVENOUS; SUBCUTANEOUS EVERY 2 HOUR PRN
Status: DISCONTINUED | OUTPATIENT
Start: 2024-11-29 | End: 2024-11-30 | Stop reason: HOSPADM

## 2024-11-29 RX ADMIN — TIZANIDINE 2 MG: 4 TABLET ORAL at 17:01

## 2024-11-29 RX ADMIN — HYDROMORPHONE HYDROCHLORIDE 0.5 MG: 0.5 INJECTION, SOLUTION INTRAMUSCULAR; INTRAVENOUS; SUBCUTANEOUS at 23:55

## 2024-11-29 RX ADMIN — TIZANIDINE 2 MG: 4 TABLET ORAL at 00:53

## 2024-11-29 RX ADMIN — ONDANSETRON 4 MG: 2 INJECTION INTRAMUSCULAR; INTRAVENOUS at 10:58

## 2024-11-29 RX ADMIN — ACETAMINOPHEN 975 MG: 325 TABLET ORAL at 03:42

## 2024-11-29 RX ADMIN — SILDENAFIL 20 MG: 20 TABLET, FILM COATED ORAL at 09:12

## 2024-11-29 RX ADMIN — ONDANSETRON 4 MG: 2 INJECTION INTRAMUSCULAR; INTRAVENOUS at 17:01

## 2024-11-29 RX ADMIN — ONDANSETRON 4 MG: 2 INJECTION INTRAMUSCULAR; INTRAVENOUS at 23:58

## 2024-11-29 RX ADMIN — ANTACID TABLETS 400 MG OF ELEMENTAL CALCIUM: 500 TABLET, CHEWABLE ORAL at 00:58

## 2024-11-29 RX ADMIN — TIZANIDINE 2 MG: 4 TABLET ORAL at 06:59

## 2024-11-29 RX ADMIN — SILDENAFIL 20 MG: 20 TABLET, FILM COATED ORAL at 19:32

## 2024-11-29 RX ADMIN — HYDROMORPHONE HYDROCHLORIDE 0.5 MG: 0.5 INJECTION, SOLUTION INTRAMUSCULAR; INTRAVENOUS; SUBCUTANEOUS at 10:58

## 2024-11-29 RX ADMIN — HYDROMORPHONE HYDROCHLORIDE 0.5 MG: 0.5 INJECTION, SOLUTION INTRAMUSCULAR; INTRAVENOUS; SUBCUTANEOUS at 14:37

## 2024-11-29 RX ADMIN — SILDENAFIL 20 MG: 20 TABLET, FILM COATED ORAL at 14:58

## 2024-11-29 RX ADMIN — HYDROMORPHONE HYDROCHLORIDE 0.5 MG: 0.5 INJECTION, SOLUTION INTRAMUSCULAR; INTRAVENOUS; SUBCUTANEOUS at 17:01

## 2024-11-29 RX ADMIN — ACETYLCYSTEINE 200 MG: 200 INHALANT RESPIRATORY (INHALATION) at 21:40

## 2024-11-29 RX ADMIN — PANTOPRAZOLE SODIUM 40 MG: 40 TABLET, DELAYED RELEASE ORAL at 09:12

## 2024-11-29 RX ADMIN — HYDROMORPHONE HYDROCHLORIDE 0.5 MG: 0.5 INJECTION, SOLUTION INTRAMUSCULAR; INTRAVENOUS; SUBCUTANEOUS at 21:41

## 2024-11-29 RX ADMIN — ONDANSETRON 4 MG: 2 INJECTION INTRAMUSCULAR; INTRAVENOUS at 07:05

## 2024-11-29 RX ADMIN — LEVOTHYROXINE SODIUM 100 MCG: 0.1 TABLET ORAL at 06:59

## 2024-11-29 ASSESSMENT — COGNITIVE AND FUNCTIONAL STATUS - GENERAL
STANDING UP FROM CHAIR USING ARMS: 2 - A LOT
CLIMB 3 TO 5 STEPS WITH RAILING: 2 - A LOT
WALKING IN HOSPITAL ROOM: 2 - A LOT
MOVING TO AND FROM BED TO CHAIR: 2 - A LOT
CLIMB 3 TO 5 STEPS WITH RAILING: 2 - A LOT
WALKING IN HOSPITAL ROOM: 2 - A LOT
MOVING TO AND FROM BED TO CHAIR: 2 - A LOT
STANDING UP FROM CHAIR USING ARMS: 2 - A LOT

## 2024-11-29 NOTE — PROGRESS NOTES
"[facility] Discharge Plan      Patient Name: Arielle GUAJARDO Isidro                                                                                        Patient MRN: [id number]  Date / Time Note Created: 11/29/2024 2:56 PM  Attending Physician: Kush Castañeda Jr.*  Primary Care Physician: Sara Simmons MD     Code Status History:  Current Code Status: DNR (A.N.D.)    Jewish:    Caodaism    Allergies:  Allergies   Allergen Reactions    Aspirin Shortness of breath     Other reaction(s): Unknown  \"stop breathing\"      Ibuprofen Shortness of breath     Stop breathing    Iodine Shortness of breath     Other reaction(s): Unknown    Iodine And Iodide Containing Products Shortness of breath     ? rash    Morphine Shortness of breath      Reported wamrth of face, improved with benadryl and cold compresses after getting morphine as well as episode of (subjective ) dyspnea, and thought she passed out - did have wamrth and erythema of face with mild edema R>L cheek notably on exam.     Penicillins Shortness of breath     Other reaction(s): Unknown    Sulfa (Sulfonamide Antibiotics) Angioedema    Clindamycin     Hydrochlorothiazide      Other reaction(s): Abdominal Pain   Slow heart rate    Oxycodone      Other reaction(s): Vomiting    Sulfamethoxazole-Trimethoprim      Other reaction(s): Vomiting, Weakness     Latex Rash       Advance Directives:  Hospice Consents:  Not Signed  OOH DNR:  Signed  Emergency Contact: mauricio french; Relationship: Daughter; BillWagner; Relationship: Daughter    Hospice Assessment:  Diagnosis: Lung Cancer, in the setting of Scleroderma  Current Palliative Performance Status: 30%   Preferred Setting for Care: Hospice at Home    Pt will be discharged to home tomorrow, 11/30/24.  Pt will be admitted to routine hospice care at home.  Marah  is scheduled for 11/30/24 at 3pm.  Ambul trip number:  [id number]  Hospice comfort kit w/o morphine, liquid Dilaudid, Duonebs " ordered from [employer] and scheduled for delivery tomorrow, via [employer]. .  DME ordered from [facility].  It is scheduled for delivery tomorrow.     We will continue to follow.    Please call with any questions or concerns.       Thank you for the opportunity to participate in this patient's hospital plan of care.      Tennille PERKINSN, RN, PN  Hospice Liaison at [facility]  [facility]  [address] [place] [address]  Call [phone] 24 hours a day for questions or concerns           Feeding and  care were discussed today and parent questions were answered

## 2024-11-29 NOTE — PROGRESS NOTES
Subjective:   Pt seen at bedside for chronic bilateral foot digital wounds.  Patient very pleasant this morning, is much livelier today and is very communicative.  Patient family has bedside. Pt admits to mild pedal pain bilaterally.  Dressing clean, dry, and intact.  Patient is very particular about her wound care dressings and gives instructions as needed.  Patient states that she is not allergic to iodine topically, only iodinated contrast and in her body.  Denies any N/V/F/C/CP/SOB.     Past Medical/Surgical history, Allergies, Meds reviewed in detail as charted  FH/SH reviewed in detail as charted    ROS:  Head and Neck: No complaints  Chest : No complaints  Abdomen: No Complaints  Constitutional: Unremarkable     Vitals: I have reviewed the patient's pertinent vital signs.     Radiology: Reviewed     Labs:   CBC Results         11/26/24 11/25/24 11/24/24     0836 1252 0349    WBC 7.64 6.92 7.85    RBC 3.58 4.03 3.62    HGB 10.9 12.1 10.9    HCT 35.2 39.4 35.0    MCV 98.3 97.8 96.7    MCH 30.4 30.0 30.1    MCHC 31.0 30.7 31.1     181 202          BMP Results         11/26/24 11/25/24 11/24/24     0836 1252 0349     141 141    K 4.1 4.1 3.8    Cl 108 107 108    CO2 26 28 26    Glucose 85 91 92    BUN 15 13 14    Creatinine 1.0 0.8 1.1    Calcium 9.0 9.3 8.8    Anion Gap 7 6 7    EGFR >60.0 >60.0 56.2           Comment for EGFR at 0836 on 11/26/24: Calculation based on the Chronic Kidney Disease Epidemiology Collaboration (CKD-EPI) equation refit without adjustment for race.    Comment for EGFR at 1252 on 11/25/24: Calculation based on the Chronic Kidney Disease Epidemiology Collaboration (CKD-EPI) equation refit without adjustment for race.    Comment for EGFR at 0349 on 11/24/24: Calculation based on the Chronic Kidney Disease Epidemiology Collaboration (CKD-EPI) equation refit without adjustment for race.              LE Objective:   -Vascular: DP/PT pulses are faintly palpable B/L. Negative edema  to the foot, ankle and leg bilaterally. Capillary refill time is sluggish bilaterally. Skin temperature is warm to warm from leg to toes B/L.                            -Ortho: - active df/pf of ankle. MMT 2/5 in all planes tested.  Tenderness on palpation to the digits bilateral foot.     -Neuro: Light touch and protective sensation diminished bilaterally.     -Derm:   RLE: Partial-thickness wound located to the distal tuft of the second digit.  Wound base is epithelializing currently.  Wound edges intact with no undermining.  No periwound erythema or lymphatic streaking appreciated at this time.  No drainage, no purulence, no fluctuance, no crepitus, no malodor, no probe to deep tissue, no probe to bone.     LLE: Unstageable wound located to the distal tuft of the second digit.  Wound is necrotic with intact wound edges without undermining.  No periwound erythema or lymphatic streaking appreciated at this time.  No drainage, no purulence, no fluctuance, no crepitus, no malodor, no probe to deep tissue, no probe to bone.     No evidence pressure injury to bilateral heels at this time.     See media tab for clinical pictures.     Assessment: 64-year-old female with bilateral feet chronic second digital wounds.    Plan:     -Patient seen, evaluated and treated with all questions and concerns answered and addressed.  -Bilateral foot wounds appear clinically stable, with no clinical SOI at this time.  -Bilateral foot dressings were clean dry and intact upon bedside visit.  -Bilateral foot digits were cleansed with wound cleanser.  Betadine paint was applied to bilateral foot second digits.  Xeroform strip applied over the second digits bilaterally.  Xeroform interwoven in between digits bilaterally.  4 x 4 gauze applied over the second digit bilaterally.  Mepilex cut in half applied over the second digit bilaterally and secured with paper tape.  Kerlix applied to bilateral foot and secured with silk tape.  -A&E  ointment to be applied to bilateral heels.  -Waffle boots ordered for bilateral lower extremity use while in bed.  -Patient may weight-bear as tolerated to bilateral lower extremity.  -No surgical intervention from podiatry standpoint at this time.  -Podiatry will follow in house, dressing changes biweekly.  Nursing help in interim appreciated  -Patient may follow-up outpatient with podiatry upon discharge.  -Rest of care per primary team.   -Please contact on call podiatry resident with any questions or concerns.        Tod Ricks IV PGY-1  #[id number]

## 2024-11-29 NOTE — ASSESSMENT & PLAN NOTE
Cardiology consultation appreciated.  Patient's blood pressure has been soft today, so I will hold her ethacrynic acid.

## 2024-11-29 NOTE — PLAN OF CARE
Plan of Care Review  Plan of Care Reviewed With: patient  Progress: no change  Outcome Evaluation: Patient is conitnue with comfort care, taking PRN pain and nausea meds with relief. Continue with 3L O2.

## 2024-11-29 NOTE — PROGRESS NOTES
Palliative Medicine Progress Note    Assessment and Plan      Palliative care by specialist  Assessment & Plan  64 year old with metastatic lung adenocarcinoma, ILD, scleroderma, and PH presenting with worsening dyspnea.    - goals are comfort focused, DNR/DNI, hospice team following to help determine discharge plan  - patient able to make complex medical decisions, two daughters also assist  - discussed with patient and family 11/29, they are figuring out timing of discharge    Cancer associated pain  Assessment & Plan  - Butrans discontinued as patient felt it was not helping  - APAP 975 mg tid prn, max 4 g/day  -Continue tizanidine 2 mg q6hr prn  - IV hydromorphone 0.5 mg q2hr prn, give with Zofran to avoid nausea      Debility  Assessment & Plan  - significant debility in the setting of scleroderma, ILD, and metastatic malignancy, pleural effusion s/p thoracentesis was briefly on Bipap.   - lives at home alone with 24 hour HHA services,  requires frequent assistance from family  - high risk for further decline              Nando Naik MD          Subjective    Reports 8/10 pain. Denies dyspnea at rest.      Objective      Palliative Performance Score: 40      Physical Exam:  Physical Exam  Constitutional:       General: She is not in acute distress.  HENT:      Head: Atraumatic.      Mouth/Throat:      Mouth: Mucous membranes are dry.   Eyes:      Conjunctiva/sclera: Conjunctivae normal.   Pulmonary:      Effort: No respiratory distress.   Abdominal:      General: There is no distension.   Musculoskeletal:      Comments: cachexia   Psychiatric:         Behavior: Behavior normal.           Vitals:  Vitals:    11/29/24 0815   BP: (!) 109/56   Pulse: 62   Resp: 16   Temp: 36.5 °C (97.7 °F)   SpO2: 100%       Laboratory Studies:    CBC Results         11/26/24 11/25/24 11/24/24     0836 1252 0349    WBC 7.64 6.92 7.85    RBC 3.58 4.03 3.62    HGB 10.9 12.1 10.9    HCT 35.2 39.4 35.0    MCV 98.3 97.8 96.7    MCH  30.4 30.0 30.1    MCHC 31.0 30.7 31.1     181 202          CMP Results         11/26/24 11/25/24 11/24/24     0836 1252 0349     141 141    K 4.1 4.1 3.8    Cl 108 107 108    CO2 26 28 26    Glucose 85 91 92    BUN 15 13 14    Creatinine 1.0 0.8 1.1    Calcium 9.0 9.3 8.8    Anion Gap 7 6 7    AST -- 13 --    ALT -- 5 --    Albumin -- 3.6 --    EGFR >60.0 >60.0 56.2           Comment for EGFR at 0836 on 11/26/24: Calculation based on the Chronic Kidney Disease Epidemiology Collaboration (CKD-EPI) equation refit without adjustment for race.    Comment for EGFR at 1252 on 11/25/24: Calculation based on the Chronic Kidney Disease Epidemiology Collaboration (CKD-EPI) equation refit without adjustment for race.    Comment for EGFR at 0349 on 11/24/24: Calculation based on the Chronic Kidney Disease Epidemiology Collaboration (CKD-EPI) equation refit without adjustment for race.              Labs reviewed. Notable for cr 1.0    Imaging and Other Studies:     Radiology Imaging    XR CHEST 1 VW    Narrative  CLINICAL HISTORY: Chest pain, nonspecific    COMPARISON: 11/23/2024    COMMENT:    TECHNIQUE: Single frontal view of the chest was performed.    LINES/TUBES/HARDWARE: None    LUNGS AND PLEURA: Redemonstrated dense consolidation in the left lower lobe.  Patchy airspace opacities throughout the bilateral lungs on a background of  interstitial edema. Small-moderate left pleural effusion. No pneumothorax.  Overall, findings not significantly changed from prior study.    CARDIOMEDIASTINUM: Cardiomediastinal silhouette appears unchanged.    SKELETON/OTHER: No acute osseous abnormality.    Impression  IMPRESSION:  Patchy multifocal airspace opacities with vascular and interstitial prominence.  This could either represent patchy pulmonary edema, a multifocal pneumonia or a  combination of both. Overall findings not significantly changed from prior  study.                                                                       Lab Results   Component Value Date    QTCCALCULAT 469 11/25/2024

## 2024-11-29 NOTE — PROGRESS NOTES
"  Hospice Follow Up Progress Note      Patient Name: Arielle Felix                                                                                        Patient MRN: [id number]  Date / Time Note Created: 11/29/2024 11:37 AM  Attending Physician: Kush Castañeda Jr.*  Primary Care Physician: Sara Simmons MD     Reason for Consult:  Hospice Evaluation    Code Status History:  Current Code Status: DNR (A.N.D.)    Holiness:  Christianity    Allergies:  Allergies   Allergen Reactions    Aspirin Shortness of breath     Other reaction(s): Unknown  \"stop breathing\"      Ibuprofen Shortness of breath     Stop breathing    Iodine Shortness of breath     Other reaction(s): Unknown    Iodine And Iodide Containing Products Shortness of breath     ? rash    Morphine Shortness of breath      Reported wamrth of face, improved with benadryl and cold compresses after getting morphine as well as episode of (subjective ) dyspnea, and thought she passed out - did have wamrth and erythema of face with mild edema R>L cheek notably on exam.     Penicillins Shortness of breath     Other reaction(s): Unknown    Sulfa (Sulfonamide Antibiotics) Angioedema    Clindamycin     Hydrochlorothiazide      Other reaction(s): Abdominal Pain   Slow heart rate    Oxycodone      Other reaction(s): Vomiting    Sulfamethoxazole-Trimethoprim      Other reaction(s): Vomiting, Weakness     Latex Rash       Advance Directives:  Emergency Contact: mauricio french; Relationship: Daughter; Wagner Khan; Relationship: Daughter    Plan  Patient does not meet inpatient hospice criteria at this time. Patient hesitant to take comfort medications due to them making her feel \"loopy\" and nauseous . Spoke with palliative care MD and medical team. Medical team lowed dose of opioid available to patient in hope of patient not having adverse side effects from medication and palliative care MD ordered extra dose of anti emetic. Will follow up with patient this " afternoon to assess symptoms.We will continue to follow.Please call with any questions or concerns.    MADDIE ValentineN, RN  Hospice Liaison at [facility]  [facility]  [address] [place]   [address]  Call [phone] 24 hours a day for questions or concerns

## 2024-11-29 NOTE — ASSESSMENT & PLAN NOTE
This was diagnosed at the [facility] by pleural fluid cytology.  The patient was evaluated at the [facility] [place] was advised that they could offer no cancer directed therapy.  The patient is now on comfort care.  Plan is for discharge to home hospice tomorrow, as discussed above.

## 2024-11-29 NOTE — PROGRESS NOTES
Hospital Medicine     Daily Progress Note                SUBJECTIVE   Interval History: The patient reports intermittent abdominal pain.  She is reluctant to take IV hydromorphone because it causes some lethargy and nausea.     OBJECTIVE      Vital signs in last 24 hours:  Temp:  [36.5 °C (97.7 °F)-36.9 °C (98.4 °F)] 36.5 °C (97.7 °F)  Heart Rate:  [62-88] 88  Resp:  [16] 16  BP: (106-109)/(55-56) 109/56    Intake/Output Summary (Last 24 hours) at 11/29/2024 1711  Last data filed at 11/29/2024 0800  Gross per 24 hour   Intake 10 ml   Output --   Net 10 ml         PHYSICAL EXAMINATION      Physical Exam  Constitutional:       Comments: The patient is awake, alert, and responsive, and appears uncomfortable due to pain.            LINES, CATHETERS, DRAINS, AIRWAYS, AND WOUNDS   Lines, Drains, and Airways:  Wounds (agree with documentation and present on admission):  Peripheral IV (Adult) 11/23/24 Anterior;Right;Upper Arm (Active)   Number of days: 6       Peripheral IV (Adult) 11/25/24 Right Antecubital (Active)   Number of days: 4       Wound Pressure Injury Right Buttock (Active)   Number of days: 6         Comments:    LABS / IMAGING / TELE      Labs  No new labs      ASSESSMENT AND PLAN        Assessment & Plan  Acute on chronic respiratory failure with hypoxia (CMS/HCC)   Patient with lung cancer with malignant pleural effusion, underlying pulmonary hypertension, interstitial lung disease and HFpEF. CT Chest showed evidence of worsening L pleural effusion, mucous plugging and debris concerning for aspiration.  She underwent left thoracentesis with removal of 800 mL of fluid.  The palliative care nurse practitioner met with the patient and her daughters, at which time the decision was made to transition her to comfort care, with a plan for home hospice.  Plan is to continue acetylcysteine nebs, ipratropium-albuterol nebs, buprenorphine patch, ethacrynic acid, scheduled ondansetron, pantoprazole, sucralfate,  acetaminophen as needed, glycopyrrolate as needed, lorazepam as needed, prochlorperazine as needed, tizanidine as needed, IV hydromorphone as needed.  Of note, patient is allergic to morphine and most diuretics.  She does tolerate hydromorphone and ethacrynic acid.  The patient was evaluated today by the hospice team and determined that the patient does not qualify for inpatient hospice.  The plan is to discharge the patient tomorrow to home hospice.  I discussed the plan with her daughter, Rigo, who will be the primary caregiver for the patient at home, and she is in agreement with the plan.  I will reduce the dose of IV hydromorphone in the hopes that this will cause less sedation and nausea.  I encouraged the patient to use it as often as she needs it.  Malignant pleural effusion  Status post left thoracentesis with removal of 800 mL of fluid.  Patient is now on comfort care.  Adenocarcinoma, lung (CMS/HCC)  This was diagnosed at the [facility] by pleural fluid cytology.  The patient was evaluated at the [facility] [place] was advised that they could offer no cancer directed therapy.  The patient is now on comfort care.  Plan is for discharge to home hospice tomorrow, as discussed above.  Interstitial lung disease (CMS/HCC)  Failed mycophenolate outpatient per notes.  Pulmonary hypertension (CMS/HCC)  Cardiology consultation appreciated.  Continue sildenafil/macitentan.  Chronic heart failure with preserved ejection fraction (CMS/HCC)  Cardiology consultation appreciated.  Patient's blood pressure has been soft today, so I will hold her ethacrynic acid.  Hypothyroid  Continue levothyroxine.  Scleroderma (CMS/HCC)  Noted.  Peripheral arterial disease (CMS/HCC)  Discontinue atorvastatin and clopidogrel, as the patient is now on comfort care.  GERD (gastroesophageal reflux disease)  Continue pantoprazole, calcium carbonate as needed, and sucralfate.  Essential (primary) hypertension  Blood  pressure is adequately controlled off her usual amlodipine.  Discontinue amlodipine.   Continue labetalol as needed.  Open toe wound  Podiatry consultation appreciated.  Wound care consultation requested.  Continue local wound care.    VTE Assessment: [place]    VTE Prophylaxis:  Current anticoagulants:    None      Code Status: DNR (A.N.D.)      Estimated Discharge Date: 11/29/2024     Disposition Planning: To home hospice tomorrow          Kush Castañeda Jr, MD  11/29/2024

## 2024-11-29 NOTE — ASSESSMENT & PLAN NOTE
Patient with lung cancer with malignant pleural effusion, underlying pulmonary hypertension, interstitial lung disease and HFpEF. CT Chest showed evidence of worsening L pleural effusion, mucous plugging and debris concerning for aspiration.  She underwent left thoracentesis with removal of 800 mL of fluid.  The palliative care nurse practitioner met with the patient and her daughters, at which time the decision was made to transition her to comfort care, with a plan for home hospice.  Plan is to continue acetylcysteine nebs, ipratropium-albuterol nebs, buprenorphine patch, ethacrynic acid, scheduled ondansetron, pantoprazole, sucralfate, acetaminophen as needed, glycopyrrolate as needed, lorazepam as needed, prochlorperazine as needed, tizanidine as needed, IV hydromorphone as needed.  Of note, patient is allergic to morphine and most diuretics.  She does tolerate hydromorphone and ethacrynic acid.  The patient was evaluated today by the hospice team and determined that the patient does not qualify for inpatient hospice.  The plan is to discharge the patient tomorrow to home hospice.  I discussed the plan with her daughter, Rigo, who will be the primary caregiver for the patient at home, and she is in agreement with the plan.  I will reduce the dose of IV hydromorphone in the hopes that this will cause less sedation and nausea.  I encouraged the patient to use it as often as she needs it.

## 2024-11-30 VITALS
HEART RATE: 81 BPM | WEIGHT: 108 LBS | BODY MASS INDEX: 17.43 KG/M2 | OXYGEN SATURATION: 100 % | RESPIRATION RATE: 16 BRPM | TEMPERATURE: 97.7 F | SYSTOLIC BLOOD PRESSURE: 109 MMHG | DIASTOLIC BLOOD PRESSURE: 56 MMHG

## 2024-11-30 PROCEDURE — 63700000 HC SELF-ADMINISTRABLE DRUG: Performed by: INTERNAL MEDICINE

## 2024-11-30 PROCEDURE — 63600000 HC DRUGS/DETAIL CODE: Mod: JZ | Performed by: INTERNAL MEDICINE

## 2024-11-30 PROCEDURE — 25000000 HC PHARMACY GENERAL: Performed by: INTERNAL MEDICINE

## 2024-11-30 PROCEDURE — 200200 PR NO CHARGE: Performed by: INTERNAL MEDICINE

## 2024-11-30 PROCEDURE — 63700000 HC SELF-ADMINISTRABLE DRUG: Performed by: NURSE PRACTITIONER

## 2024-11-30 PROCEDURE — 63600000 HC DRUGS/DETAIL CODE: Mod: JZ | Performed by: NURSE PRACTITIONER

## 2024-11-30 PROCEDURE — 99239 HOSP IP/OBS DSCHRG MGMT >30: CPT | Performed by: INTERNAL MEDICINE

## 2024-11-30 RX ORDER — DOCUSATE SODIUM 100 MG/1
100 CAPSULE, LIQUID FILLED ORAL 2 TIMES DAILY
Start: 2024-11-30 | End: 2024-12-30

## 2024-11-30 RX ORDER — BISACODYL 10 MG/1
10 SUPPOSITORY RECTAL DAILY PRN
Start: 2024-11-30 | End: 2025-03-01

## 2024-11-30 RX ORDER — ONDANSETRON 4 MG/1
4 TABLET, FILM COATED ORAL EVERY 6 HOURS
Start: 2024-11-30 | End: 2025-03-01

## 2024-11-30 RX ORDER — ACETAMINOPHEN 500 MG
500 TABLET ORAL EVERY 8 HOURS PRN
Start: 2024-11-30 | End: 2025-03-01

## 2024-11-30 RX ORDER — ETHACRYNIC ACID 25 MG/1
25 TABLET ORAL DAILY
Qty: 30 TABLET | Refills: 0 | Status: SHIPPED | OUTPATIENT
Start: 2024-11-30 | End: 2024-12-30

## 2024-11-30 RX ADMIN — SILDENAFIL 20 MG: 20 TABLET, FILM COATED ORAL at 14:16

## 2024-11-30 RX ADMIN — ANTACID TABLETS 400 MG OF ELEMENTAL CALCIUM: 500 TABLET, CHEWABLE ORAL at 00:34

## 2024-11-30 RX ADMIN — ACETAMINOPHEN 975 MG: 325 TABLET ORAL at 16:31

## 2024-11-30 RX ADMIN — HYDROMORPHONE HYDROCHLORIDE 0.5 MG: 0.5 INJECTION, SOLUTION INTRAMUSCULAR; INTRAVENOUS; SUBCUTANEOUS at 08:50

## 2024-11-30 RX ADMIN — LEVOTHYROXINE SODIUM 100 MCG: 0.1 TABLET ORAL at 05:47

## 2024-11-30 RX ADMIN — HYDROMORPHONE HYDROCHLORIDE 0.5 MG: 0.5 INJECTION, SOLUTION INTRAMUSCULAR; INTRAVENOUS; SUBCUTANEOUS at 14:53

## 2024-11-30 RX ADMIN — ONDANSETRON 4 MG: 2 INJECTION INTRAMUSCULAR; INTRAVENOUS at 12:20

## 2024-11-30 RX ADMIN — ONDANSETRON 4 MG: 2 INJECTION INTRAMUSCULAR; INTRAVENOUS at 05:47

## 2024-11-30 RX ADMIN — TIZANIDINE 2 MG: 4 TABLET ORAL at 01:56

## 2024-11-30 RX ADMIN — TIZANIDINE 2 MG: 4 TABLET ORAL at 11:02

## 2024-11-30 RX ADMIN — IPRATROPIUM BROMIDE AND ALBUTEROL SULFATE 3 ML: .5; 3 SOLUTION RESPIRATORY (INHALATION) at 05:57

## 2024-11-30 RX ADMIN — IPRATROPIUM BROMIDE AND ALBUTEROL SULFATE 3 ML: .5; 3 SOLUTION RESPIRATORY (INHALATION) at 13:12

## 2024-11-30 ASSESSMENT — COGNITIVE AND FUNCTIONAL STATUS - GENERAL
STANDING UP FROM CHAIR USING ARMS: 2 - A LOT
MOVING TO AND FROM BED TO CHAIR: 2 - A LOT
CLIMB 3 TO 5 STEPS WITH RAILING: 2 - A LOT
WALKING IN HOSPITAL ROOM: 2 - A LOT

## 2024-11-30 NOTE — DISCHARGE SUMMARY
Hospital Medicine    Inpatient Discharge Summary        BRIEF OVERVIEW     Patient: Arielle Felix  Admission Date: 2024   : 1960 Discharge Date: 2024       PCP: Sara Simmons MD  Disposition: Hospice/Home - [facility]    Destination:       Attending Provider: No att. providers found Attending phys phone: N/A    Code Status At Discharge: DNR (A.N.D.)    Arielle Felix was seen by the palliative care team during this hospitalization.  Please see palliative care team documentation for further details.  Also reference the advance care planning (ACP) website (Advance Care Planning (Advance Directive)  Patient services  [facility] ) to learn more about completing an advance directive, including Health Care Power of  and Living Will documents.        ASSESSMENT AND PLAN     Assessment & Plan  Acute on chronic respiratory failure with hypoxia (CMS/HCC)   Patient with lung cancer with malignant pleural effusion, underlying pulmonary hypertension, interstitial lung disease and HFpEF. CT Chest showed evidence of worsening L pleural effusion, mucous plugging and debris concerning for aspiration.  She underwent left thoracentesis with removal of 800 mL of fluid.  The palliative care nurse practitioner met with the patient and her daughters, at which time the decision was made to transition her to comfort care, with a plan for home hospice. The plan is to discharge the patient today to home hospice.  I discussed the plan today at the bedside with her daughter, Rigo, who will be the primary caregiver for the patient at home, and she is in agreement with the plan.  Malignant pleural effusion  Status post left thoracentesis with removal of 800 mL of fluid.   Adenocarcinoma, lung (CMS/HCC)  This was diagnosed at the [facility] by pleural fluid cytology.  The patient was evaluated at the [facility] was advised that they could offer no cancer directed  Patient transported to [place] by RN and Tech, with bedside defibrillator monitoring heart rate and cardiac rhythm continuously. Vital signs stable prior to leaving Cath Lab room. Report to be given to the receiving RN at bedside upon arrival.  "therapy.  The patient is now on comfort care.  Plan is for discharge to home hospice today, as discussed above.  Interstitial lung disease (CMS/HCC)  Failed mycophenolate outpatient per notes.  Pulmonary hypertension (CMS/HCC)  Cardiology consultation appreciated.  Continue sildenafil/macitentan.  Chronic heart failure with preserved ejection fraction (CMS/HCC)  Cardiology consultation appreciated.  Restart ethacrynic acid, which may contribute to comfort.  Hypothyroid  Continue levothyroxine.  Scleroderma (CMS/HCC)  Noted.  Peripheral arterial disease (CMS/HCC)  Atorvastatin and clopidogrel have been discontinued, as the patient is now on comfort care.  GERD (gastroesophageal reflux disease)  Continue pantoprazole.  Essential (primary) hypertension  Amlodipine has been discontinued as the patient's blood pressure has been normal off the medication  Open toe wound  Podiatry consultation and wound care consultation appreciated.  Continue local wound care.      Brief Hospital Course  This is a 64 y.o. year-old female admitted on 11/23/2024 with Acute on chronic respiratory failure with hypoxia (CMS/HCC).    From the H&P:  \"Arielle Felix is a 64 y.o. female with a past medical history of pulmonary hypertension, hypertension, PAD status post stent, Raynaud's, systemic scleroderma, ILD, PE, adenocarcinoma of the lung who presented to the emergency room secondary to worsening shortness of breath.  She reports that she has been short of breath for approximately 1 month or longer.  Per outpatient documentation she was recently seen by Dr. Arvizu in his office on 11/19.  She did voice that she was short of breath at that time.  He was concerned that she had reaccumulation of her left-sided pleural effusion and wanted her evaluated that day for possible thoracentesis.  It appears that family declined and she went home.  She then followed up with Lukasz Sigala on 11/21.  Per her recollection she is not a candidate for any " "further treatment and this is confirmed with her daughters.  Since then the shortness of breath had progressively worsened thus prompting her to come to the emergency room today.     In the emergency room she was initially in acute respiratory distress and ultimately placed on BiPAP with improvement.  Laboratory studies showed a mild elevation in potassium 5.3, creatinine 1.0, WBC 6.61, hemoglobin 14.8, hide sensitive troponin negative x 2, BNP was 213, VBG showed a pH of 7.38, pCO2 44 pO2 of 49.     She was seen in ED bed 12.  At this time she is bleeding to have the BiPAP turned off.  Respiratory therapy was at bedside who has been weaning down the pressures and currently was 8/5 at 50% which patient felt was too much pressure for her.  We discussed that we would be able to trial her off BiPAP and see how she does but if she were to go back into respiratory distress would have to replace.  We discussed her CODE STATUS and currently she is DNR/DNI     I did call her daughter Tiffanie Ko via telephone.  They just recently stepped out to go to lunch.  History was confirmed with the daughter.  We did discuss that the patient voiced that she wanted to be DNR/DNI which seem to be a surprise to the daughters.  We discussed that further conversation can be had and if this changes to let us know.\"    The patient was admitted for evaluation and treatment of her acute on chronic respiratory failure.  This was deemed to be multifactorial, due to her left pleural effusion, underlying lung cancer, pulmonary hypertension and interstitial lung disease.  Cardiology, pulmonology, and palliative care consultations were obtained.  The patient was continued on her home medications for pulmonary hypertension and interstitial lung disease.  She underwent a left thoracentesis with production of 800 mL of fluid.  Her respiratory status improved slightly, but she continued to be hypoxic and experienced left lateral chest pain a family " meeting was held with the patient's 2 daughters and the palliative care service, at which time the patient's CODE STATUS was changed to DNR/DNI.  Shortly thereafter, in view of the patient's continuing symptoms, her daughters made her comfort care.  Hospice consultation was obtained, and the patient was deemed appropriate for home hospice.    Exam on Day of Discharge  Temp:  [36.5 °C (97.7 °F)-36.8 °C (98.3 °F)] 36.5 °C (97.7 °F)  Heart Rate:  [81-82] 81  Resp:  [16] 16        DISCHARGE MEDICATIONS   In addition to the medications listed below, the patient will receive other comfort focused medications, which will be brought to her home by the hospice team.     Medication List        START taking these medications      acetaminophen 500 mg tablet  Commonly known as: TYLENOL EXTRA STRENGTH  Take 1 tablet (500 mg total) by mouth every 8 (eight) hours as needed for pain.  Dose: 500 mg     bisacodyL 10 mg suppository  Commonly known as: DULCOLAX  Insert 1 suppository (10 mg total) into the rectum daily as needed for constipation.  Dose: 10 mg     ethacrynic acid 25 mg tablet  Commonly known as: EDECRIN  Take 1 tablet (25 mg total) by mouth daily.  Dose: 25 mg     mouth moisturizer  Commonly known as: TOOTHETTE  0.5 mL by mucous membrane route every 2 (two) hours as needed (dry mouth).  Dose: 0.5 mL            CHANGE how you take these medications      docusate sodium 100 mg capsule  Commonly known as: COLACE  Take 1 capsule (100 mg total) by mouth 2 (two) times a day.  Dose: 100 mg  What changed:   when to take this  reasons to take this     ondansetron 4 mg tablet  Commonly known as: ZOFRAN  Take 1 tablet (4 mg total) by mouth every 6 (six) hours.  Dose: 4 mg  What changed:   when to take this  reasons to take this            CONTINUE taking these medications      ACTEMRA 200 mg/10 mL (20 mg/mL) solution  Infuse 8 mg/kg into a venous catheter every 28 (twentyeight) days.  Dose: 8 mg/kg  Generic drug: tocilizumab      levothyroxine 100 mcg tablet  Commonly known as: SYNTHROID  Take 100 mcg by mouth daily. BRAND ONLY  Dose: 100 mcg     loperamide 2 mg capsule  Commonly known as: IMODIUM  Take 2 mg by mouth every 8 (eight) hours as needed for diarrhea.  Dose: 2 mg     meclizine 25 mg tablet  Commonly known as: ANTIVERT  Take 25 mg by mouth daily as needed.  Dose: 25 mg     mupirocin 2 % ointment  Commonly known as: BACTROBAN  Apply 1 application. topically daily. Behind ears  Dose: 1 application.     OPSUMIT 10 mg tablet tablet  Take 1 tablet (10 mg total) by mouth daily.  Dose: 10 mg  Generic drug: macitentan     pantoprazole 40 mg EC tablet  Commonly known as: PROTONIX  Take 1 tablet (40 mg total) by mouth daily Indications: gastroesophageal reflux disease.  Dose: 40 mg     polyethylene glycol 17 gram packet  Commonly known as: MIRALAX  Take 17 g by mouth 2 (two) times a day as needed (constipation) for up to 5 days.  Dose: 17 g     sildenafiL (pulm.hypertension) 20 mg tablet  Commonly known as: REVATIO  Take 1 tablet (20 mg total) by mouth 3 (three) times a day.  Dose: 20 mg     sodium chloride 0.65 % nasal spray  Commonly known as: OCEAN  Administer 2 sprays into each nostril daily.  Dose: 2 spray     tiZANidine 2 mg tablet  Commonly known as: ZANAFLEX  Take 2 mg by mouth every 6 (six) hours as needed for muscle spasms.  Dose: 2 mg            STOP taking these medications      amLODIPine 5 mg tablet  Commonly known as: NORVASC     atorvastatin 40 mg tablet  Commonly known as: LIPITOR     benzonatate 100 mg capsule  Commonly known as: TESSALON     biotin 1 mg tablet     buprenorphine 5 mcg/hour patch weekly  Commonly known as: BUTRANS     cholecalciferol (vitamin D3) 1,000 unit (25 mcg) tablet     clopidogreL 75 mg tablet  Commonly known as: PLAVIX     cyanocobalamin (vitamin B-12) 1,000 mcg capsule     cyclobenzaprine 5 mg tablet  Commonly known as: FLEXERIL     gabapentin 100 mg capsule  Commonly known as: NEURONTIN      honey 100 % paste  Commonly known as: MEDIHONEY     hydrocortisone 1 % cream     lidocaine 4 % adhesive patch,medicated topical patch  Commonly known as: ASPERCREME     MEDIHONEY (HONEY) TOP     SantyL ointment  Generic drug: collagenase             INSTRUCTIONS AND FOLLOW-UP       Scheduled Appointments            In 3 months Timothy Arvizu DO [facility] General Cardiology at [facility]            Recommended Follow-up Visits  Follow-Up After Discharge       Sara Simmons MD   Relationship: PCP - General        Follow up            Test Results Pending at Discharge  Pending Inpatient Labs       Order Current Status    [place] Draw Panel In process            LABS AND IMAGING   Pertinent Labs      Pertinent Imaging  IR THORACENTESIS    Result Date: 11/25/2024  IMPRESSION: Successful ultrasound-guided left thoracentesis with 800 mL removed and discarded. I certify that I have personally reviewed this examination and agree with Amira Cortez's report. Fuad Feliciano M.D.    X-RAY CHEST 1 VIEW    Result Date: 11/25/2024  IMPRESSION: Patchy multifocal airspace opacities with vascular and interstitial prominence. This could either represent patchy pulmonary edema, a multifocal pneumonia or a combination of both. Overall findings not significantly changed from prior study.     CT CHEST WITHOUT IV CONTRAST    Result Date: 11/23/2024  IMPRESSION: 1. Worsening mucous plugging/aspiration in the left lung with complete collapse left lower lobe and increasing subsegmental atelectasis left upper lobe superimposed upon mild background CHF and pulmonary emphysema/fibrosis. At least partially loculated moderate left pleural effusion is slightly larger, potentially malignant given increasing areas of peripheral pleural parenchymal nodularity, though this is indeterminate in the setting of additional airspace opacity suspicious for aspiration pneumonia (asymmetric increased interlobular septal thickening/edema  changes in the left lung may potentially also represent lymphangitic spread of neoplasm). Recommend close clinical and imaging follow-up; ultimately, repeat PET/CT is likely necessary. 2. Incompletely evaluated renal hyperdensities, for which nonemergent outpatient enhanced abdominal MRI is advised.    X-RAY CHEST 1 VIEW    Result Date: 11/23/2024  IMPRESSION: Please see comment. Recommend follow-up radiographs or chest CT in 6-8 weeks.    Cardiac Imaging    TRANSTHORACIC ECHO (TTE) LIMITED 07/19/2024    Interpretation Summary  Rhythm is sinus tachycardia.    Mild increased wall thickness with normal left ventricular cavity and preserved systolic function. EF 65%. No wall motion abnormalities.  Normal right ventricular size with preserved systolic function.  IVC normal in size with >50% respiratory variation consistent with normal right sided filling pressures.  Small pericardial effusion. No evidence of hemodynamic compromise with normal respiratory variation and no chamber collapse. Prominent epicardial fat.  Compared with previous study of 5/23/2024, small pericardial effusion persists.      OTHER SERVICES PROVIDED   Consults During Admission  IP CONSULT TO PAIN/PALLIATIVE CARE  IP CONSULT TO CARDIOLOGY  IP CONSULT TO CASE MANAGEMENT  IP CONSULT TO NUTRITION SERVICES  IP CONSULT TO PULMONOLOGY/SLEEP MEDICINE  IP CONSULT TO SPIRITUAL HEALTH AND EDUCATION  IP CONSULT TO PODIATRY  IP CONSULT TO HOSPICE  IP CONSULT TO WOUND OSTOMY CONTINENCE    Procedures Performed  Left thoracentesis      Thank you for allowing the Division of Hospital Medicine to care for your patient.        >30 mins spent for coordination/counselling related to discharge plan, including final examination of patient, discussion regarding discharge instructions, reconciliation/prescription of medications, preparation of discharge records, etc.

## 2024-11-30 NOTE — ASSESSMENT & PLAN NOTE
This was diagnosed at the [facility] by pleural fluid cytology.  The patient was evaluated at the [place] [facility] [place] was advised that they could offer no cancer directed therapy.  The patient is now on comfort care.  Plan is for discharge to home hospice today, as discussed above.

## 2024-11-30 NOTE — ASSESSMENT & PLAN NOTE
Amlodipine has been discontinued as the patient's blood pressure has been normal off the medication

## 2024-11-30 NOTE — PLAN OF CARE
Plan of Care Review  Plan of Care Reviewed With: patient  Progress: no change  Outcome Evaluation: Pt continued on comfort care. administered pain and nausea/vomiting meds as ordered PRN. O2 NC 3L continues

## 2024-11-30 NOTE — ASSESSMENT & PLAN NOTE
Patient with lung cancer with malignant pleural effusion, underlying pulmonary hypertension, interstitial lung disease and HFpEF. CT Chest showed evidence of worsening L pleural effusion, mucous plugging and debris concerning for aspiration.  She underwent left thoracentesis with removal of 800 mL of fluid.  The palliative care nurse practitioner met with the patient and her daughters, at which time the decision was made to transition her to comfort care, with a plan for home hospice. The plan is to discharge the patient today to home hospice.  I discussed the plan today at the bedside with her daughter, Rigo, who will be the primary caregiver for the patient at home, and she is in agreement with the plan.

## 2024-11-30 NOTE — ASSESSMENT & PLAN NOTE
This was diagnosed at the [facility] by pleural fluid cytology.  The patient was evaluated at the [place] [facility] [place] was advised that they could offer no cancer directed therapy.  The patient is now on comfort care.  Plan is for discharge to home hospice today, as discussed above.

## 2024-11-30 NOTE — PROGRESS NOTES
Visited with patient and daughter at bedside.  Although patient appeared very weak, she was able to express her mary in God/. Pt asked for a bedside prayer in which the  complied, asked God's help to stand and withstand during this physical trial.  Pt and daughter were grateful for the sc visit and will contact sc if additional spiritual or emotional care is needed.    Rev. Ryann Euceda  Spiritual Care Associate  [phone], [phone]

## 2024-11-30 NOTE — PROGRESS NOTES
"  Hospice Follow Up Progress Note      Patient Name: Arielle Hammondstower                                                                                        Patient MRN: [id number]  Date / Time Note Created: 11/30/2024 9:47 AM  Attending Physician: Kush Castañeda Jr.*  Primary Care Physician: Sara Simmons MD     Reason for Consult:  Hospice Evaluation    Code Status History:  Current Code Status: DNR (A.N.D.)    Tenriism:  Quaker    Allergies:  Allergies   Allergen Reactions    Aspirin Shortness of breath     Other reaction(s): Unknown  \"stop breathing\"      Ibuprofen Shortness of breath     Stop breathing    Iodine Shortness of breath     Other reaction(s): Unknown    Iodine And Iodide Containing Products Shortness of breath     ? rash    Morphine Shortness of breath      Reported wamrth of face, improved with benadryl and cold compresses after getting morphine as well as episode of (subjective ) dyspnea, and thought she passed out - did have wamrth and erythema of face with mild edema R>L cheek notably on exam.     Penicillins Shortness of breath     Other reaction(s): Unknown    Sulfa (Sulfonamide Antibiotics) Angioedema    Clindamycin     Hydrochlorothiazide      Other reaction(s): Abdominal Pain   Slow heart rate    Oxycodone      Other reaction(s): Vomiting    Sulfamethoxazole-Trimethoprim      Other reaction(s): Vomiting, Weakness     Latex Rash       Advance Directives:  Emergency Contact: mauricio french; Relationship: Daughter; Wagner Khan; Relationship: Daughter    Plan  Spoke with patient and daughter [place] at bedside. Patient scheduled for d/c via [facility] at 3:45p. DME equipment scheduled for delivery between 9:30a-3p. Comfort pack schedule for delivery today, 11/30/2024. Informed daughter to call if DME is not delivered by 3 prior to patient pick-up. Patient has used no morphine or ativan in 24h. OOH DNR signed by daughter, attending notified of signature needed.     We will " continue to follow.  Please call with any questions or concerns.    Valeria Shirley MSN, RN  Hospice Liaison   [facility]  [address] [place]   [address]  Call [phone] 24 hours a day for questions or concerns  E-Mail:  [email or web address]  Cell: [phone]

## 2024-11-30 NOTE — PROGRESS NOTES
Hospital Medicine     Daily Progress Note                SUBJECTIVE   Interval History: When I saw the patient this morning she was anxious, seated on the commode.  Her daughter, who was in the room, was upset by the perceived lack of responsiveness of the nursing staff.  I listened to her concerns and asked the nursing supervisor to see her.  When I returned to the room later in the day, the patient offers no complaints.  Her other daughter was in the room and also offered no complaints.  I reviewed the discharge plan with her daughter, including the discharge medication plan.     OBJECTIVE      Vital signs in last 24 hours:  Temp:  [36.5 °C (97.7 °F)-36.8 °C (98.3 °F)] 36.5 °C (97.7 °F)  Heart Rate:  [81-82] 81  Resp:  [16] 16    Intake/Output Summary (Last 24 hours) at 11/30/2024 1758  Last data filed at 11/30/2024 0800  Gross per 24 hour   Intake 0 ml   Output --   Net 0 ml         PHYSICAL EXAMINATION      Physical Exam  Constitutional:       Comments: When I saw the patient this afternoon, she was awake, alert, and responsive, and appeared comfortable.            LINES, CATHETERS, DRAINS, AIRWAYS, AND WOUNDS   Lines, Drains, and Airways:  Wounds (agree with documentation and present on admission):  Wound Pressure Injury Right Buttock (Active)   Number of days: 7         Comments:    LABS / IMAGING / TELE      Labs  No new labs    ASSESSMENT AND PLAN        Assessment & Plan  Acute on chronic respiratory failure with hypoxia (CMS/HCC)   Patient with lung cancer with malignant pleural effusion, underlying pulmonary hypertension, interstitial lung disease and HFpEF. CT Chest showed evidence of worsening L pleural effusion, mucous plugging and debris concerning for aspiration.  She underwent left thoracentesis with removal of 800 mL of fluid.  The palliative care nurse practitioner met with the patient and her daughters, at which time the decision was made to transition her to comfort care, with a plan for  home hospice. The plan is to discharge the patient today to home hospice.  I discussed the plan today at the bedside with her daughter, Rigo, who will be the primary caregiver for the patient at home, and she is in agreement with the plan.  Malignant pleural effusion  Status post left thoracentesis with removal of 800 mL of fluid.   Adenocarcinoma, lung (CMS/HCC)  This was diagnosed at the [facility] by pleural fluid cytology.  The patient was evaluated at the [place] [facility] [place] was advised that they could offer no cancer directed therapy.  The patient is now on comfort care.  Plan is for discharge to home hospice today, as discussed above.  Interstitial lung disease (CMS/HCC)  Failed mycophenolate outpatient per notes.  Pulmonary hypertension (CMS/HCC)  Cardiology consultation appreciated.  Continue sildenafil/macitentan.  Chronic heart failure with preserved ejection fraction (CMS/HCC)  Cardiology consultation appreciated.  Restart ethacrynic acid, which may contribute to comfort.  Hypothyroid  Continue levothyroxine.  Scleroderma (CMS/HCC)  Noted.  Peripheral arterial disease (CMS/HCC)  Atorvastatin and clopidogrel have been discontinued, as the patient is now on comfort care.  GERD (gastroesophageal reflux disease)  Continue pantoprazole.  Essential (primary) hypertension  Amlodipine has been discontinued as the patient's blood pressure has been normal off the medication  Open toe wound  Podiatry consultation and wound care consultation appreciated.  Continue local wound care.    VTE Assessment: [place]    VTE Prophylaxis:  Current anticoagulants:    None      Code Status: DNR (A.N.D.)      Estimated Discharge Date: 11/30/2024     Disposition Planning: To home hospice today.  Time spent on discharge services greater than 30 minutes.          Kush Castañeda Jr, MD  11/30/2024                normal mouth and gums/moist

## 2024-11-30 NOTE — DISCHARGE INSTRUCTIONS
You are being discharged to home hospice.  Your discharge medication list includes medications that are focused on maintaining your comfort.  Most of these medications you already have at home.  A prescription is being sent to your pharmacy to restart ethacrynic acid, which is a diuretic medicine that will help prevent you from having extra fluid in your body.  In addition, the hospice team will be bringing other medications to your home including, pain medications.  The hospice team may also discontinue some medications.    Please continue foot dressings as you were doing before the hospital admission.

## 2025-01-29 LAB
ATRIAL RATE: 135
P AXIS: 65
PR INTERVAL: 124
QRS DURATION: 72
QT INTERVAL: 310
QTC CALCULATION(BAZETT): 465
R AXIS: 28
T WAVE AXIS: 106
VENTRICULAR RATE: 135

## 2025-02-03 ENCOUNTER — TELEPHONE (OUTPATIENT)
Dept: CARDIOLOGY | Facility: CLINIC | Age: 65
End: 2025-02-03
Payer: COMMERCIAL

## 2025-02-03 NOTE — TELEPHONE ENCOUNTER
"Received a message form [facility] RN that Ms. Felix's Macitentan revealed \"gap in therapy\". I called and spoke to her daughter who revealed to me that she is now on hospice and only taking pain medications.     I informed nurse.     Camryn JOEL        "

## 2025-02-27 ENCOUNTER — TELEPHONE (OUTPATIENT)
Dept: CARDIOLOGY | Facility: CLINIC | Age: 65
End: 2025-02-27
Payer: COMMERCIAL

## 2025-02-27 NOTE — TELEPHONE ENCOUNTER
Spoke with daughter to confirm appointment for tomorrow in office, patients daughter stated the patient is .  Patient daughter did have a question for BLAS but mentioned she would reach out through [place].

## (undated) DEVICE — ISOVUE 300 100ML

## (undated) DEVICE — CATH INFLATION DEVICE ENCORE

## (undated) DEVICE — VISION PROBE AND SUCTION ADAPTER ION

## (undated) DEVICE — Device

## (undated) DEVICE — ADHESIVE SKIN DERMABOND ADVANCED 0.7ML

## (undated) DEVICE — SWIVEL CONNECTOR ION

## (undated) DEVICE — ULTRASOUND PROBE COVER 6"X96"

## (undated) DEVICE — APPLICATOR CHLORAPREP 26ML ORANGE TINT

## (undated) DEVICE — CRYOPROBE, SINGLE USE 1.1 MM

## (undated) DEVICE — CATH DEB IN.PACT ADMIRAL .035 5 X 40 X 130

## (undated) DEVICE — GLOVE SZ 8 MICRO PROTEXIS LATEX

## (undated) DEVICE — CATH OMNI FLUSH 4FR 65CM

## (undated) DEVICE — GLIDEWIRE ANGLED .25IN X 150 J WIRE

## (undated) DEVICE — KIT CATH LAB ANGIO

## (undated) DEVICE — GLIDEWIRE ADVANTAGE 260CM

## (undated) DEVICE — CATH SUPPORT NAVICROSS .035 150CM ANGLE

## (undated) DEVICE — SHEATH INTRODUCER PRELUDE IDEAL HYDROPHILIC SF 5F .021MM

## (undated) DEVICE — SHEATH INTRODUCER PRELUDE IDEAL HYDROPHILIC SF 7F .021MM

## (undated) DEVICE — SHEATH RENAL 6 FR. STRAIGHT DESTINATION

## (undated) DEVICE — TRAY PNEUMOTHORAX WAYNE

## (undated) DEVICE — STAPLER SKIN

## (undated) DEVICE — CATH BALLOON MUSTANG 5.0 X40 135CM

## (undated) DEVICE — GOWN SIRUS POLYRNF BRTHSLV XL 30/CS

## (undated) DEVICE — TOWEL SURGICAL W17XL27IN BLUE COTTON STANDARD PREWASHED DELI

## (undated) DEVICE — DRAPE 3/4 REINFORCED 53X77 20/CS

## (undated) DEVICE — FORCEP DISP BRONCHOSCOPE 2MM NON-SPIIKED

## (undated) DEVICE — SOLN IRRIG STER WATER 1000ML

## (undated) DEVICE — SLEEVE SCD COMFORT KNEE LENGTH MED

## (undated) DEVICE — GAUZE 8X4 16 PLY RFID DOUBLE XRAY